# Patient Record
Sex: FEMALE | Race: BLACK OR AFRICAN AMERICAN | NOT HISPANIC OR LATINO | Employment: OTHER | URBAN - METROPOLITAN AREA
[De-identification: names, ages, dates, MRNs, and addresses within clinical notes are randomized per-mention and may not be internally consistent; named-entity substitution may affect disease eponyms.]

---

## 2017-01-13 ENCOUNTER — ANTICOAGULATION THERAPY VISIT (OUTPATIENT)
Dept: ANTICOAGULATION | Facility: CLINIC | Age: 42
End: 2017-01-13

## 2017-01-13 ENCOUNTER — ALLIED HEALTH/NURSE VISIT (OUTPATIENT)
Dept: SURGERY | Facility: CLINIC | Age: 42
End: 2017-01-13

## 2017-01-13 DIAGNOSIS — Z79.01 LONG-TERM (CURRENT) USE OF ANTICOAGULANTS: ICD-10-CM

## 2017-01-13 DIAGNOSIS — Z79.01 LONG-TERM (CURRENT) USE OF ANTICOAGULANTS: Primary | ICD-10-CM

## 2017-01-13 DIAGNOSIS — Z79.01 LONG TERM CURRENT USE OF ANTICOAGULANT THERAPY: ICD-10-CM

## 2017-01-13 DIAGNOSIS — I48.91 ATRIAL FIBRILLATION, UNSPECIFIED TYPE (H): Primary | ICD-10-CM

## 2017-01-13 LAB — INR PPP: 3.32 (ref 0.86–1.14)

## 2017-01-13 NOTE — MR AVS SNAPSHOT
MRN:[id number]                      After Visit Summary   1/13/2017    Pricilla Brown    MRN: [id number]           Visit Information        Provider Department      1/13/2017 10:30 AM Paulette Ruiz RD Surgical Weight Management        Your next 10 appointments already scheduled     Jan 13, 2017 11:15 AM   LAB with  LAB   [facility] Lab ([facility])    [address] [place] [id number]   [phone]           Patient must bring picture ID.  Patient should be prepared to give a urine specimen  Please do not eat 10-12 hours before your appointment if you are coming in fasting for labs on lipids, cholesterol, or glucose (sugar).  Pregnant women should follow their Care Team instructions. Water with medications is okay. Do not drink coffee or other fluids.   If you have concerns about taking  your medications, please ask at office or if scheduling via [employer], send a message by clicking on Secure Messaging, Message Your Care Team.            Jan 24, 2017  7:30 AM   Lab with  LAB   [facility] Lab ([facility])    [address] [place] [id number] [phone]            Jan 24, 2017  8:00 AM   (Arrive by 7:45 AM)   RETURN HEART FAILURE with Percy Alcala MD   [facility] Heart Care ([facility])    [address] [place] [id number] [phone]            Feb 07, 2017 11:30 AM   (Arrive by 11:00 AM)   RETURN DIABETES with Isela Archibald PA-C   [facility] Endocrinology ([facility])    [address] [place] [phone] [phone]            Feb 28, 2017 10:00 AM   Return Sleep Patient with YOCASTA Horan CNP   [facility], [place], Sleep Study ([facility])    [address] [place] [phone]               Care Instructions    GOALS:  Relating To Eating:  Continue to Avoid Sugar and Limit Carbs:  -Track food intake prior to eating on [email or web address] with a target of no more than 1,800 Calories for 2 lbs/week weight loss, 2400 mg sodium or less/day would be great.    - Once you are re-evaluated by medical weight management and your diabetes management team, start a modified liquid diet (900 Jagjit/day):   Follow the Modified Liquid Diet for weight loss:  Breakfast: Protein Shake  Lunch: Protein Shake  Supper: 3 oz lean protein + non-starchy vegetables  Snack: non-starchy vegetables (no calorie-containing dips/condiments)  Beverages: at least 48-64 oz water between meals daily    *Protein Shake Criteria: ~200 Calories, at least 20 grams of protein, and less than 10 grams of sugar     -Eat slowly (20-30 minutes per meal), chewing foods well (25 chews per bite/applesauce consistency)    -Increase walking as able.  Walk around the building twice weekly, increasing as able.    Follow-up with Paulette in 2 weeks for weight check and 1 month for full follow-up appointment.   See medical weight management (NANETTE Kerr) as soon as possible.        [employer] Information     [employer] gives you secure access to your electronic health record. If you see a primary care provider, you can also send messages to your care team and make appointments. If you have questions, please call your primary care clinic.  If you do not have a primary care provider, please call [phone] and they will assist you.      [employer] is an electronic gateway that provides easy, online access to your medical records. With [employer], you can request a clinic appointment, read your test results, renew a prescription or communicate with your care team.     To access your existing account, please contact your [facility] or call [phone] for assistance.        Care EveryWhere ID     This is your Care EveryWhere ID. This could  be used by other organizations to access your [facility] medical records  [id number]

## 2017-01-13 NOTE — PROGRESS NOTES
"  ANTICOAGULATION FOLLOW-UP CLINIC VISIT    Patient Name:  Pricilla Brown  Date:  1/13/2017    SUBJECTIVE:    Indication: A. Fib    Bleeding Signs/Symptoms:  \"bruising easily\" per patient.  Thromboembolic Signs/Symptoms:  None    Medication Changes:  No  Dietary Changes:  No  Activity Changes: No  Bacterial/Viral Infection:  No    Missed Coumadin Doses:  None    Other Concerns:  No    OBJECTIVE:    INR Today:  3.32  Current Dose:  20mg MWF, 15mg ROW    ASSESSMENT:    Supratherapeutic INR for goal of 2-2.5    PLAN: see below    New Dose: 1/13-15mg (one time dose), then resume 20mg MWF and 15 mg ROW      Next INR: 1 week    Spoke with patient.          "

## 2017-01-13 NOTE — PATIENT INSTRUCTIONS
GOALS:  Relating To Eating:  Continue to Avoid Sugar and Limit Carbs:  -Track food intake prior to eating on [email or web address] with a target of no more than 1,800 Calories for 2 lbs/week weight loss, 2400 mg sodium or less/day would be great.    - Once you are re-evaluated by medical weight management and your diabetes management team, start a modified liquid diet (900 Jagjit/day):   Follow the Modified Liquid Diet for weight loss:  Breakfast: Protein Shake  Lunch: Protein Shake  Supper: 3 oz lean protein + non-starchy vegetables  Snack: non-starchy vegetables (no calorie-containing dips/condiments)  Beverages: at least 48-64 oz water between meals daily    *Protein Shake Criteria: ~200 Calories, at least 20 grams of protein, and less than 10 grams of sugar     -Eat slowly (20-30 minutes per meal), chewing foods well (25 chews per bite/applesauce consistency)    -Increase walking as able.  Walk around the building twice weekly, increasing as able.    Follow-up with Paulette in 2 weeks for weight check and 1 month for full follow-up appointment.   See medical weight management (NANETTE Kerr) as soon as possible.

## 2017-01-13 NOTE — MR AVS SNAPSHOT
After Visit Summary   1/13/2017    Pricilla Brown    MRN: [id number]           Patient Information     Date Of Birth          1975        Visit Information        Provider Department      1/13/2017 10:30 AM Paulette Ruiz RD Surgical Weight Management        Care Instructions    GOALS:  Relating To Eating:  Continue to Avoid Sugar and Limit Carbs:  -Track food intake prior to eating on MyFitnessPal.com with a target of no more than 1,800 Calories for 2 lbs/week weight loss, 2400 mg sodium or less/day would be great.    - Once you are re-evaluated by medical weight management and your diabetes management team, start a modified liquid diet (900 Jagjit/day):   Follow the Modified Liquid Diet for weight loss:  Breakfast: Protein Shake  Lunch: Protein Shake  Supper: 3 oz lean protein + non-starchy vegetables  Snack: non-starchy vegetables (no calorie-containing dips/condiments)  Beverages: at least 48-64 oz water between meals daily    *Protein Shake Criteria: ~200 Calories, at least 20 grams of protein, and less than 10 grams of sugar     -Eat slowly (20-30 minutes per meal), chewing foods well (25 chews per bite/applesauce consistency)    -Increase walking as able.  Walk around the building twice weekly, increasing as able.    Follow-up with Paulette in 2 weeks for weight check and 1 month for full follow-up appointment.   See medical weight management (NANETTE Kerr) as soon as possible.         Follow-ups after your visit        Your next 10 appointments already scheduled     Jan 13, 2017 11:15 AM   LAB with  LAB    Health Lab ([facility])    [address] [place] [id number] [phone]           Patient must bring picture ID.  Patient should be prepared to give a urine specimen  Please do not eat 10-12 hours before your appointment if you are coming in fasting for labs on lipids, cholesterol, or glucose (sugar).  Pregnant women  should follow their Care Team instructions. Water with medications is okay. Do not drink coffee or other fluids.   If you have concerns about taking  your medications, please ask at office or if scheduling via [employer], send a message by clicking on Secure Messaging, Message Your Care Team.            Jan 24, 2017  7:30 AM   Lab with  LAB   [facility] ([facility])    [address] [place] [address]  [facility] [id number] [phone]            Jan 24, 2017  8:00 AM   (Arrive by 7:45 AM)   RETURN HEART FAILURE with Percy Alcala MD   [facility] Heart Care ([facility])    [address] [place] [phone]            Feb 03, 2017  7:15 AM   (Arrive by 7:00 AM)   Return Weight Management Visit with Steph Kim PA-C   Medical Weight Management ([facility])    [address] [place] [phone] [phone]            Feb 07, 2017 11:30 AM   (Arrive by 11:00 AM)   RETURN DIABETES with Isela Archibald PA-C   [facility] Endocrinology ([facility])    [address] [place] [phone] [phone]            Feb 13, 2017 11:00 AM   NUTRITION VISIT with Paulette Ruiz RD   Surgical Weight Management ([facility])    [address] [place] [phone]            Feb 27, 2017 10:30 AM   Nurse Visit with  Surgery Nurse   General Surgery ([facility])    [address] [place] [id number] [phone]            Feb 28, 2017 10:00 AM   Return Sleep Patient with YOCASTA Horan CNP   [facility], [place], Sleep Study ([facility])    [address] [place] [phone]              Who to contact     Please call your  clinic at [phone] to:    Ask questions about your health    Make or cancel appointments    Discuss your medicines    Learn about your test results    Speak to your doctor   If you have compliments or concerns about an experience at your clinic, or if you wish to file a complaint, please contact [facility] Patient Relations at [phone] or email us at [email or web address]         Additional Information About Your Visit        MyChart Information     [facility] gives you secure access to your electronic health record. If you see a primary care provider, you can also send messages to your care team and make appointments. If you have questions, please call your primary care clinic.  If you do not have a primary care provider, please call [phone] and they will assist you.      [employer] is an electronic gateway that provides easy, online access to your medical records. With [employer], you can request a clinic appointment, read your test results, renew a prescription or communicate with your care team.     To access your existing account, please contact your [facility] or call [phone] for assistance.        Care EveryWhere ID     This is your Care EveryWhere ID. This could be used by other organizations to access your [place] medical records  [id number]         Blood Pressure from Last 3 Encounters:   12/28/16 150/89   11/29/16 104/70   11/21/16 123/81    Weight from Last 3 Encounters:   12/28/16 460 lb   12/28/16 461 lb 9.6 oz   11/14/16 455 lb 11.2 oz              Today, you had the following     No orders found for display       Primary Care Provider Office Phone # Fax #    Jamilah Bernal [phone]       [facility] [address] [facility] [address]  [facility] [address]        Thank you!     Thank you for choosing SURGICAL WEIGHT MANAGEMENT  for your care. Our goal is always to provide you with excellent care. Hearing  back from our patients is one way we can continue to improve our services. Please take a few minutes to complete the written survey that you may receive in the mail after your visit with us. Thank you!             Your Updated Medication List - Protect others around you: Learn how to safely use, store and throw away your medicines at www.disposemymeds.org.          This list is accurate as of: 1/13/17 11:13 AM.  Always use your most recent med list.                   Brand Name Dispense Instructions for use    acetaminophen 325 MG tablet    TYLENOL    60 tablet    Take 2 tablets (650 mg) by mouth 3 times daily as needed for mild pain or fever (total acetaminophen dose should not exceed 3000 mg per day)       albuterol 108 (90 BASE) MCG/ACT Inhaler    PROAIR HFA/PROVENTIL HFA/VENTOLIN HFA    1 Inhaler    Inhale 2 puffs into the lungs every 6 hours as needed.       bisacodyl 10 MG Suppository    DULCOLAX    6 suppository    Place 1 suppository (10 mg) rectally daily as needed for constipation       * blood glucose monitoring lancets     1 Box    Use to test blood sugars 4 times daily or as directed.       * blood glucose monitoring lancets     4 Box    Use to test blood sugar 4 times daily or as directed.       blood glucose monitoring meter device kit     1 kit    Use to test blood sugars 2 times daily or as directed.       * blood glucose monitoring test strip    ACCU-CHEK RON    100 strip    Use to test blood sugars 2 times daily or as directed.       * blood glucose monitoring test strip    ACCU-CHEK SMARTVIEW    450 each    Use to test blood sugar 5 times daily       buPROPion 100 MG 12 hr tablet    WELLBUTRIN SR    60 tablet    Take 1 tablet (100 mg) by mouth 2 times daily       capsaicin 0.025 % Crea cream    ZOSTRIX    1 Tube    Apply 1 g topically 3 times daily as needed       ciclopirox 8 % Soln     1 Bottle    Externally apply topically daily To toenails.       diclofenac 1 % Gel topical gel    VOLTAREN  "   100 g    Place 4 g onto the skin 4 times daily       DULoxetine 20 MG EC capsule    CYMBALTA    60 capsule    Take 1 capsule (20 mg) by mouth 2 times daily       Efinaconazole 10 % Soln     8 mL    Externally apply topically daily To toenails.       gabapentin 300 MG capsule    NEURONTIN    120 capsule    Take 2 capsules (600 mg) by mouth 2 times daily       hydrALAZINE 25 MG tablet    APRESOLINE    150 tablet    Take 1 tab (25 mg) in am, 2 tabs (50 mg) midday, and 2 tabs (50 mg) in pm daily       insulin glargine U-300 300 UNIT/ML injection    TOUJEO    15 mL    Inject 150 units SQ each am.       * insulin pen needle 30G X 8 MM    NOVOFINE    100 each    Use 2X daily or as directed.       * insulin pen needle 32G X 4 MM    BD RIVER U/F    200 each    Use 6 daily or as directed.       insulin syringe-needle U-100 30G X 1/2\" 0.5 ML    BD insulin syringe ULTRAFINE    100 each    Use one syringe 3 daily or as directed.       levothyroxine 200 MCG tablet    SYNTHROID/LEVOTHROID    90 tablet    Take 1 tablet (200 mcg) by mouth daily       liraglutide 18 MG/3ML soln    VICTOZA PEN    27 mL    Inject 1.8 mg Subcutaneous daily       metoprolol 50 MG 24 hr tablet    TOPROL-XL    90 tablet    Take 0.5 tablets (25 mg) by mouth At Bedtime       morphine 15 MG 12 hr tablet    MS CONTIN     Take 15 mg by mouth daily as needed       Nebulizer Compressor Kit     1 kit    1 Device 4 times daily as needed.       nicotine 14 MG/24HR 24 hr patch    [employer] NICOTINE    30 patch    Place 1 patch onto the skin every 24 hours       NovoLOG FLEXPEN 100 UNIT/ML injection   Generic drug:  insulin aspart     60 mL    Inject 55 units with meals plus correction. Pt uses approx 180 units in 24 hrs.       nystatin cream    MYCOSTATIN    90 g    Apply topically 2 times daily To toenails       order for DME     1 each    Use your CPAP device as directed by your provider. Pressure change to min 13 max 18cwp       * order for DME     1 each    Equipment " being ordered: Challenger Wide walker if available - patient needs seat, basket and brakes.       * order for DME     1 each    Equipment being ordered: BI [id number] $92  Plantar, fasciitis, LG, night splint       * order for DME     1 Units    Left foot       oxyCODONE 10 MG IR tablet    ROXICODONE    12 tablet    Take 1 tablet (10 mg) by mouth every 6 hours as needed for moderate to severe pain       oxyCODONE-acetaminophen 5-325 MG per tablet    PERCOCET    60 tablet    Take 1 tablet by mouth every 8 hours as needed for moderate to severe pain (try to limit use, no further prescriptions until seen in pain clinic)       potassium chloride SA 20 MEQ CR tablet    potassium chloride    240 tablet    Take 2 tablets (40 mEq) by mouth 4 times daily       senna 8.6 MG tablet    SENOKOT    45 tablet    Take 1 tablet by mouth 2 times daily       spironolactone 50 MG tablet    ALDACTONE    30 tablet    Take 1 tablet (50 mg) by mouth daily       tiotropium 18 MCG capsule    SPIRIVA HANDIHALER    30 capsule    Inhale contents of one capsule daily.       topiramate 25 MG tablet    TOPAMAX    120 tablet    Take 2 tablets (50 mg) by mouth 2 times daily       torsemide 100 MG tablet    DEMADEX    180 tablet    Take 1 tablet (100 mg) by mouth 2 times daily       * warfarin 7.5 MG tablet    COUMADIN    30 tablet    Take 15 mg PO daily on 10/20, 10/21, 10/22. Check INR on 10/23. Further dosing per INR level.       * JANTOVEN 10 MG tablet   Generic drug:  warfarin      Take 10 mg by mouth daily as needed       * Notice:  This list has 11 medication(s) that are the same as other medications prescribed for you. Read the directions carefully, and ask your doctor or other care provider to review them with you.

## 2017-01-13 NOTE — PROGRESS NOTES
"  Bariatric Nutrition Consultation Note    Reason For Visit: Nutrition Reassessment    Pricilla Brown is a 41 year-old (type 2 DM on insulin sliding scale) presenting today for bariatric nutrition consult.  Pt is interested in laparoscopic sleeve gastrectomy.  This is pt's fifth of 6 RD visits required.  Pt was referred by NANETTE Kerr.    ANTHROPOMETRICS:  Ht: 67\"  Initial Wt: 462.6 lbs with BMI of 72.6  Current Wt: 462.2 lbs (+4.5 lbs over the past month; -0.4 lbs from initial weight)     Required weight loss goal pre-op: -46 lbs from initial consult weight (goal weight 416.6 lbs or less before surgery)    NUTRITION HISTORY:  Food Allergies/Intolerances: none known  Cravings: sweets, chips, pop (diet)  Triggers/cues causing extra eating: boredom (currently on disability, not working)    Progress with Previous Goals:  GOALS:  Continue to Avoid Sugar and Limit Carbs:  -Get rid of the sugary beverages (no sugar pop, no juice).- Met.  -Drink plenty of water.  You may have a little Crystal Light or [place] to help avoid juice for now.- Meeting.   -Maximum of 2 fruits daily (2 cups of watermelon + 1 apple)- Meeting.   -Rid house of sweets.  Instead, try munching on vegetables rather than chips/sweets (bell peppers, carrot sticks, celery, etc)- Meeting. She gave up candy and cake.   -*Check blood sugar, if having to treat lows often, you need to have the doctor adjust your insulin down.- Meeting; Pt stated that she has not had low BGs nor high BGs for her usual (no 300s).  She plans to see her DM management team soon to discuss taking her insulin down a notch to help her so she can decrease her carbs more so.   -Have 2 meals/day, focusing on lean protein such as skinless chicken/turkey breast, fish, eggs, pork (trim off fat), low fat beef (drain off the fat) + non-starchy vegetables.- She overeats at lunch more so than dinner.  She tries to focus on lean protein and veg at dinner, but not for lunch. Lunch is typically a " couple polish sausages, chips, sandwich, fruit, etc.   -Have 1 meal/day as a Meal Replacement Protein Shake that meets this criteria: ~200 Calories, at least 20 grams of protein, and less than 10 grams of sugar. - Met a few days, but then overate the rest of the day.   -Have no more than 2/3 cup of rice or 2 slices of bread per meal maximum.- Not met, but working on it.   -Try cauliflower rice - recipes online- Meeting.   -Avoid cheese.- She has a piece of cheese a few times/week.   -Track food intake prior to eating on MyFitnessPal.com with a target of no more than 1800 Calories for 2 lbs/week weight loss, 2400 mg sodium or less/day would be great.- Not met.   -Eat slowly (20-30 minutes per meal), chewing foods well (25 chews per bite/applesauce consistency)- Meeting.   -Increase walking as able.  Walk around the building twice weekly, increasing as able.- Improving.  She walked in her grocery store instead of using the automated riding cart yesterday, for example.     Pt is struggling with her hunger quite a bit.  She tried a protein shake for breakfast, but found herself overeating the rest of the day as a result of the restriction of calories at breakfast.  Advised Pt follow-up as soon as possible with medical weight management as she is on topiramate, but isn't feeling the appetite-suppressant effects of it any longer.     *Pt reported that Shana got the best of her.  She also has been snacking on sweets quite a bit.  She had stopped taking her appetite suppressant during that time as well.  She has picked up her refill to restart.  *Pt's boyfriend stated that Pricilla (who he calls Jia) awakes every 2 hours at night to eat as of late.  However, when on topiramate, this did not occur as much.     NUTRITION DIAGNOSIS:  Obesity r/t long history of self-monitoring deficit and excessive energy intake aeb BMI >30.- continues    INTERVENTION:  Intervention Provided/Education Provided: Discussed potential  reasoning for weight gain.  Developed strategies, reviewing previous goals.  Strongly encouraged her to track food intake as a way to become more mindful.  Once her appetite suppressant medication is adjusted to optimize her weight loss with her current needs (as she is no longer responding to topiramate per Pt report), the plan will be to start the modified liquid diet.  Gave encouragement and support.  Provided pt with list of goals.      Patient Understanding: good  Expected Compliance: good      GOALS:  Relating To Eating:  Continue to Avoid Sugar and Limit Carbs:  -Track food intake prior to eating on MyFitnessPal.com with a target of no more than 1,800 Calories for 2 lbs/week weight loss, 2400 mg sodium or less/day would be great.    - Once you are re-evaluated by medical weight management and your diabetes management team, start a modified liquid diet (900 Jagjit/day):   Follow the Modified Liquid Diet for weight loss:  Breakfast: Protein Shake  Lunch: Protein Shake  Supper: 3 oz lean protein + non-starchy vegetables  Snack: non-starchy vegetables (no calorie-containing dips/condiments)  Beverages: at least 48-64 oz water between meals daily    *Protein Shake Criteria: ~200 Calories, at least 20 grams of protein, and less than 10 grams of sugar     -Eat slowly (20-30 minutes per meal), chewing foods well (25 chews per bite/applesauce consistency)    -Increase walking as able.  Walk around the building twice weekly, increasing as able.    Follow-Up: medical weight management as soon as possible (NANETTE Kerr), weight check in 2 weeks, follow-up with RD in 1 month.     Time spent with patient: 30 minutes.  Paulette Ruiz, YARY, LD, CLT

## 2017-01-16 ENCOUNTER — TELEPHONE (OUTPATIENT)
Dept: CARDIOLOGY | Facility: CLINIC | Age: 42
End: 2017-01-16

## 2017-01-16 NOTE — TELEPHONE ENCOUNTER
Patient called in stating that since last night she has had intermittent sharp chest pain in her chest.  She states that while this happens, she can feel her heart rate race. When the pain happens, it can radiate to the top of her arm and her arm feels weak. Patient states that this has been happening with rest or with activity.  She states she has some shortness of breath with activity but denies any nausea, vomiting, abdomen or back pain.  Patient has a history of atrial fibrillation, states that she has been taking her medication and that her last INR was high.  Instructed patient to report to the ER if the pain persists throughout the night and into the morning or if she develops any shortness of breath with the pain at rest.  Will forward message to Dr Alcala's RNCC for further review.  Patient states that she understands information provided and will call back with further questions or concerns.    Luís Ambrocio RN  RN Care Coordinator  [facility]  [phone]

## 2017-01-20 ENCOUNTER — PRE VISIT (OUTPATIENT)
Dept: CARDIOLOGY | Facility: CLINIC | Age: 42
End: 2017-01-20

## 2017-01-20 DIAGNOSIS — I42.0 DILATED CARDIOMYOPATHY (H): Primary | ICD-10-CM

## 2017-01-22 NOTE — PROGRESS NOTES
Percy Alcala M.D.  Cardiovascular Medicine    I personally saw and examined this patient, discussed care with housestaff and other consultants, reviewed current laboratories and imaging studies, and conveyed impression and diagnostic/therapeutic plan to patient.    Problem List  Atrial fibrillation    SOB (shortness of breath  Chronic diastolic heart failure    Dilated cardiomyopathy    Chronic atrial fibrillation    Cellulitis  Long-term  use of anticoagulants    Plantar fasciitis  Neuropathic pain of lower extremity  Peritonsillar abscess  Asthma  Azotemia  Hypothyroidism following radioiodine therapy  JYOTI (obstructive sleep apnea) CPAP Min Pressure of 13 and Max Pressure of 18  Chronic anticoagulation for a-fib  Morbid obesity    Diabetes mellitus, type 2     History    Patient returns for follow-up with intermittent palpitations and exertional chest pain described as heaviness and pressure in chest that comes on with exertion and is relieved by rest.  It is qualitatively different than the known costochondritis.  It has awakened her from sleep at night on one occasion.  There is no pleuropericardial component.  There is no hemoptysis.  There is no pre-syncope or syncope.    REVIEW of SYMPTOMS    Constitutional: without fever, chills, night sweats.  Weight is ___  HEENT: without dry eyes, dry mouth, sinusitis, corryza, visual changes  Endocrine: without polyuria, polydypsia, polyphagia, heat or cold intolerance, changing mental status  Cardiology: without chest pain, tightness, heaviness, pressure, paroxysmal dyspnea, orthopnea, palpitation, pre-syncope or syncope or device discharge (if present)  Pulmonary: without asthma, wheezing, cough, hemoptysis  GI: without nausea, emesis, jaundice, pain, hematemesis, melena  : without frequency, urgency, hematuria, stones, pain, abnormal bleeding, frequency, urgency  Neurologic: without TIA, CVA, trauma, seizure  Dermatologic: without lesions, abrasion rash,    Orthopedic/Rheum: without significant joint pain, impairment, limb, polyserositis, ulceration, Raynauds  Heme: without mass, bruising, frequent infection, anemia  Psychiatric: without substance abuse, hallucination, medication, depression    Exercise tolerance:      Objective    Constitutional: alert, oriented, normal gait and station, normal mentation.  Oral: moist mucous membranes  Lymph: without pathologic adenopathy  Chest: basilar dullness and rales  Cor: No evidence of left or right ventricular activity.  Rhythm is regular.  S1 normal, S2 split physiologically. Murmurs are not present  Abdomen: without tenderness, rebound, guarding, masses, ascites  Extremities: Edema not present  Neuro: no focal defects, normal mentation  Skin: without open lesions  Psych: oriented, verbal, mental status in tact    Wt Readings from Last 5 Encounters:   12/28/16 208.655 kg (460 lb)   12/28/16 209.38 kg (461 lb 9.6 oz)   11/14/16 206.704 kg (455 lb 11.2 oz)   11/10/16 207.067 kg (456 lb 8 oz)   10/13/16 206.931 kg (456 lb 3.2 oz)       Meds  Current Outpatient Prescriptions   Medication     morphine (MS CONTIN) 15 MG 12 hr tablet     JANTOVEN 10 MG tablet     buPROPion (WELLBUTRIN SR) 100 MG 12 hr tablet     hydrALAZINE (APRESOLINE) 25 MG tablet     NOVOLOG FLEXPEN 100 UNIT/ML soln     insulin glargine U-300 (TOUJEO) 300 UNIT/ML injection     acetaminophen (TYLENOL) 325 MG tablet     order for DME     liraglutide (VICTOZA PEN) 18 MG/3ML soln     blood glucose monitoring (ACCU-CHEK FASTCLIX) lancets     diclofenac (VOLTAREN) 1 % GEL     oxyCODONE (ROXICODONE) 10 MG immediate release tablet     warfarin (COUMADIN) 7.5 MG tablet     metoprolol (TOPROL-XL) 50 MG 24 hr tablet     torsemide (DEMADEX) 100 MG tablet     order for DME     capsaicin (ZOSTRIX) 0.025 % CREA     order for DME     nicotine (CVS NICOTINE) 14 MG/24HR patch 2h hr     blood glucose monitoring (ACCU-CHEK SMARTVIEW) test strip     insulin pen needle (BD RIVER  "U/F) 32G X 4 MM     topiramate (TOPAMAX) 25 MG tablet     ciclopirox 8 % SOLN     nystatin (MYCOSTATIN) cream     Efinaconazole 10 % SOLN     insulin pen needle (NOVOFINE) 30G X 8 MM     gabapentin (NEURONTIN) 300 MG capsule     spironolactone (ALDACTONE) 50 MG tablet     potassium chloride SA (POTASSIUM CHLORIDE) 20 MEQ tablet     levothyroxine (SYNTHROID, LEVOTHROID) 200 MCG tablet     insulin syringe-needle U-100 (BD INSULIN SYRINGE ULTRAFINE) 30G X 1/2\" 0.5 ML     blood glucose monitoring (ONE TOUCH DELICA) lancets     oxyCODONE-acetaminophen (PERCOCET) 5-325 MG per tablet     blood glucose (ACCU-CHEK RON) test strip     DULoxetine (CYMBALTA) 20 MG capsule     senna (SENOKOT) 8.6 MG tablet     bisacodyl (DULCOLAX) 10 MG suppository     Blood Glucose Monitoring Suppl (ACCU-CHEK RON PLUS) W/DEVICE KIT     tiotropium (SPIRIVA HANDIHALER) 18 MCG inhalation capsule     Respiratory Therapy Supplies (NEBULIZER COMPRESSOR) KIT     ORDER FOR DME     albuterol (PROAIR HFA, PROVENTIL HFA, VENTOLIN HFA) 108 (90 BASE) MCG/ACT inhaler     No current facility-administered medications for this visit.         Labs  Results for BRAXTON SALMERON (MRN [id number]) as of 1/22/2017 08:40   Ref. Range 12/28/2016 14:10 1/13/2017 11:37   Sodium Latest Ref Range: 133-144 mmol/L 135    Potassium Latest Ref Range: 3.4-5.3 mmol/L 3.3 (L)    Chloride Latest Ref Range:  mmol/L 100    Carbon Dioxide Latest Ref Range: 20-32 mmol/L 28    Urea Nitrogen Latest Ref Range: 7-30 mg/dL 18    Creatinine Latest Ref Range: 0.52-1.04 mg/dL 1.01    GFR Estimate Latest Ref Range: >60 mL/min/1.7m2 60 (L)    GFR Estimate If Black Latest Ref Range: >60 mL/min/1.7m2 72    Calcium Latest Ref Range: 8.5-10.1 mg/dL 8.4 (L)    Anion Gap Latest Ref Range: 3-14 mmol/L 7    N-Terminal Pro Bnp Latest Ref Range: 0-125 pg/mL 20    Glucose Latest Ref Range: 70-99 mg/dL 204 (H)    WBC Latest Ref Range: 4.0-11.0 10e9/L 15.3 (H)    Hemoglobin Latest Ref Range: " 11.7-15.7 g/dL 12.7    Hematocrit Latest Ref Range: 35.0-47.0 % 38.1    Platelet Count Latest Ref Range: 150-450 10e9/L 323    RBC Count Latest Ref Range: 3.8-5.2 10e12/L 4.26    MCV Latest Ref Range:  fl 89    MCH Latest Ref Range: 26.5-33.0 pg 29.8    MCHC Latest Ref Range: 31.5-36.5 g/dL 33.3    RDW Latest Ref Range: 10.0-15.0 % 14.4    INR Latest Ref Range: 0.86-1.14  2.78 (H) 3.32 (H)         Assessment/Plan   1. Chest pain with feature of ASHD but known normal coronary arteries in January 2012: CT coronary angiogram    2. Known PH with known diastolic dysfunction and weight gain and dependent edema: repeat RHC in view of potential optimization for contemplated bariatric surgery

## 2017-01-24 ENCOUNTER — ANTICOAGULATION THERAPY VISIT (OUTPATIENT)
Dept: ANTICOAGULATION | Facility: CLINIC | Age: 42
End: 2017-01-24

## 2017-01-24 ENCOUNTER — OFFICE VISIT (OUTPATIENT)
Dept: CARDIOLOGY | Facility: CLINIC | Age: 42
End: 2017-01-24
Attending: INTERNAL MEDICINE
Payer: MEDICARE

## 2017-01-24 VITALS
WEIGHT: 293 LBS | BODY MASS INDEX: 45.99 KG/M2 | HEART RATE: 89 BPM | DIASTOLIC BLOOD PRESSURE: 77 MMHG | OXYGEN SATURATION: 96 % | SYSTOLIC BLOOD PRESSURE: 119 MMHG | HEIGHT: 67 IN

## 2017-01-24 DIAGNOSIS — E03.9 HYPOTHYROIDISM: Primary | ICD-10-CM

## 2017-01-24 DIAGNOSIS — Z79.01 LONG-TERM (CURRENT) USE OF ANTICOAGULANTS: ICD-10-CM

## 2017-01-24 DIAGNOSIS — I48.91 ATRIAL FIBRILLATION, UNSPECIFIED TYPE (H): Primary | ICD-10-CM

## 2017-01-24 DIAGNOSIS — I50.32 CHRONIC DIASTOLIC HEART FAILURE (H): ICD-10-CM

## 2017-01-24 DIAGNOSIS — I42.0 DILATED CARDIOMYOPATHY (H): ICD-10-CM

## 2017-01-24 LAB
ANION GAP SERPL CALCULATED.3IONS-SCNC: 6 MMOL/L (ref 3–14)
BUN SERPL-MCNC: 17 MG/DL (ref 7–30)
CALCIUM SERPL-MCNC: 8.5 MG/DL (ref 8.5–10.1)
CHLORIDE SERPL-SCNC: 101 MMOL/L (ref 94–109)
CO2 SERPL-SCNC: 31 MMOL/L (ref 20–32)
CREAT SERPL-MCNC: 0.96 MG/DL (ref 0.52–1.04)
GFR SERPL CREATININE-BSD FRML MDRD: 63 ML/MIN/1.7M2
GLUCOSE SERPL-MCNC: 212 MG/DL (ref 70–99)
INR PPP: 2.56 (ref 0.86–1.14)
POTASSIUM SERPL-SCNC: 3.4 MMOL/L (ref 3.4–5.3)
SODIUM SERPL-SCNC: 138 MMOL/L (ref 133–144)

## 2017-01-24 PROCEDURE — 36415 COLL VENOUS BLD VENIPUNCTURE: CPT | Performed by: INTERNAL MEDICINE

## 2017-01-24 PROCEDURE — 99212 OFFICE O/P EST SF 10 MIN: CPT | Mod: ZF

## 2017-01-24 PROCEDURE — 85610 PROTHROMBIN TIME: CPT | Performed by: INTERNAL MEDICINE

## 2017-01-24 PROCEDURE — 80048 BASIC METABOLIC PNL TOTAL CA: CPT | Performed by: INTERNAL MEDICINE

## 2017-01-24 PROCEDURE — 99214 OFFICE O/P EST MOD 30 MIN: CPT | Mod: ZP | Performed by: INTERNAL MEDICINE

## 2017-01-24 RX ORDER — LEVOTHYROXINE SODIUM 200 UG/1
200 TABLET ORAL DAILY
Qty: 90 TABLET | Refills: 3 | Status: SHIPPED | OUTPATIENT
Start: 2017-01-24 | End: 2018-02-07

## 2017-01-24 RX ORDER — LIDOCAINE 40 MG/G
CREAM TOPICAL
Status: CANCELLED | OUTPATIENT
Start: 2017-01-24

## 2017-01-24 ASSESSMENT — PAIN SCALES - GENERAL: PAINLEVEL: NO PAIN (0)

## 2017-01-24 NOTE — NURSING NOTE
Chief Complaint   Patient presents with     Follow Up For     HEART FAILURE RETURN- 6 MONTH   NOTE- Patient has extreme SOB while and after walking

## 2017-01-24 NOTE — MR AVS SNAPSHOT
After Visit Summary   1/24/2017    Pricilla Brown    MRN: [id number]           Patient Information     Date Of Birth          1975        Visit Information        Provider Department      1/24/2017 8:00 AM Percy Alcala MD [facility]        Today's Diagnoses     Chest pain    -  1       Care Instructions    CT angiogram    Right heart cath    Check-In  Time Check-In Location Estimated Length Procedure   Name        [facility]  waiting room 60-90 minutes Right Heart Catheterization**     Procedure Preparations & Instructions     This is an invasive procedure that DOES require preparation:    - Nothing to eat for 6 hours   - You may have clear liquids up until the time of your procedure  - A ride should be arranged for you in the instance you are unable to drive home, however you should be able to function as you normally would after the procedure     *For Patients with Diabetes: ? DO NOT take any oral diabetic medication, short-acting diabetes medications/insulin, humalog or regular insulin the morning of your test  ? Take   dose of long-acting insulin (Lantus, Levemir) the day of your test  ? Remember to  bring your glucometer and insulin with you to take after your test if needed     *For Patients on anticoagulants: ? Hold your Coumadin 2 days prior       If you have questions, please call Dr. Alcala's nurse Maite RN at [phone]  Press #1 for University and #3 to speak with a nurse        Follow-ups after your visit        Follow-up notes from your care team     Return in about 2 weeks (around 2/7/2017) for follow up after testing complete with Dr. Parra  return heart failure.      Your next 10 appointments already scheduled     Jan 26, 2017  3:00 PM   CTA ANGIOGRAM CORONARY ARTERY with [id number]   [facility], [place], [place] ([facility])    [address] [place] [id number] [phone]           Please allow two hours for  this test.  Follow the instructions below:   The day before your exam, drink extra fluids at least six 8-ounce glasses (unless your doctor wants you to restrict your fluids).   No caffeine and no smoking the day of the test.   Do not eat or drink for 3 hours before your exam. You may take your morning medicines with small sips of water.   You may have or need a blood test (creatinine test) within 30 days of your exam. Go to your clinic or Diagnostic Imaging Department for this test.   If you take Viagra, Levitra or Cialis, stop taking it for 48 hours before your test.   If you are diabetic and take oral hypoglycemics, do not take them on the day of your test. Also, wait 48 hours before re-starting metformin (Avandamet, Glucophage, Glucovance, Metaglip).   Do not take diuretics on day of the test. This includes Furosemide (Lasix), Torsemide, Bumetanide (Bumex), Metolazone (Zaroxolyn) and Hydrochlorothiazide.   Do not take NSAIDS on the day of the test. This includes ibuprofen (Advil or Motrin), Naproxen and Indomethacin.  Bring any scans or X-rays taken at other hospitals, if similar tests were done. Also bring a list of your medicines, including vitamins, minerals and over-the-counter drugs. It is safest to leave personal items at home.  Be sure to tell your doctor:   If you have any allergies, including any reaction to contrast.   If there s any chance you are pregnant.   If you are breastfeeding.   If you have any special needs.  Please wear loose clothing, such as a sweat suit or jogging clothes. Avoid snaps, zippers and other metal. We may ask you to undress and put on a hospital gown.  If you have any questions, please call the Imaging Department where you will have your exam.            Feb 02, 2017 11:00 AM   Return Visit with Norman Jean DPM   [facility]    [id number] [place] [address]   [phone]            Feb 03, 2017  7:15 AM    (Arrive by 7:00 AM)   Return Weight Management Visit with Steph Kim PA-C   [facility] Medical Weight Management ([facility])    [address] [facility] [place] [address]   [phone]            Feb 06, 2017  8:30 AM   Heart Cath Right Heart Cath with [id number]   [facility],  Heart Cath Lab ([facility])    [address] [address]   [phone]            Feb 07, 2017 11:30 AM   (Arrive by 11:00 AM)   RETURN DIABETES with Isela Archibald PA-C   [facility] Endocrinology ([facility])    [address]  [address] [facility] [place] [address] [phone]            Feb 13, 2017 11:00 AM   NUTRITION VISIT with Paulette Ruiz RD   [facility] Surgical Weight Management ([facility])    [address]  [address] [facility] [place] [address]   [phone]            Feb 22, 2017  3:30 PM   (Arrive by 3:15 PM)   RETURN HEART FAILURE with Percy Alcala MD   [facility] Heart Care ([facility])    [address]  [address] [facility] [place] [address] [phone]            Feb 27, 2017 10:30 AM   Nurse Visit with  Surgery Nurse   General Surgery ([facility])    [address] [place] [address]  [address] [facility] [address] [phone]            Feb 28, 2017 10:00 AM   Return Sleep Patient with YOCASTA Horan CNP   [facility] [place], Sleep Study ([facility])    [address] [place] [id number] [phone]              Future tests that were ordered for you today     Open Future Orders        Priority Expected Expires Ordered    Heart Cath Right heart cath Routine  1/24/2018 1/24/2017    CT Angiogram coronary artery Routine 1/25/2017 1/24/2018 1/24/2017            Who to contact     If you have questions or need  "follow up information about today's clinic visit or your schedule please contact [facility] directly at [phone].  Normal or non-critical lab and imaging results will be communicated to you by MyChart, letter or phone within 4 business days after the clinic has received the results. If you do not hear from us within 7 days, please contact the clinic through [employer] or phone. If you have a critical or abnormal lab result, we will notify you by phone as soon as possible.  Submit refill requests through [employer] or call your pharmacy and they will forward the refill request to us. Please allow 3 business days for your refill to be completed.          Additional Information About Your Visit        [employer] Information     [employer] gives you secure access to your electronic health record. If you see a primary care provider, you can also send messages to your care team and make appointments. If you have questions, please call your primary care clinic.  If you do not have a primary care provider, please call [phone] and they will assist you.        Care EveryWhere ID     This is your Care EveryWhere ID. This could be used by other organizations to access your [facility] medical records  [id number]        Your Vitals Were     Pulse Height BMI (Body Mass Index) Pulse Oximetry          89 1.702 m (5' 7\") 72.04 kg/m2 96%         Blood Pressure from Last 3 Encounters:   01/24/17 119/77   12/28/16 150/89   11/29/16 104/70    Weight from Last 3 Encounters:   01/24/17 208.7 kg (460 lb 1.6 oz)   12/28/16 208.655 kg (460 lb)   12/28/16 209.38 kg (461 lb 9.6 oz)               Primary Care Provider Office Phone # Fax #    Jamilah Bernal [phone]       [address]  [facility] [address]        Thank you!     Thank you for choosing [facility]  for your care. Our goal is always to provide you with excellent care. Hearing back from our patients is one way we can " continue to improve our services. Please take a few minutes to complete the written survey that you may receive in the mail after your visit with us. Thank you!             Your Updated Medication List - Protect others around you: Learn how to safely use, store and throw away your medicines at www.disposemymeds.org.          This list is accurate as of: 1/24/17  9:10 AM.  Always use your most recent med list.                   Brand Name Dispense Instructions for use    acetaminophen 325 MG tablet    TYLENOL    60 tablet    Take 2 tablets (650 mg) by mouth 3 times daily as needed for mild pain or fever (total acetaminophen dose should not exceed 3000 mg per day)       albuterol 108 (90 BASE) MCG/ACT Inhaler    PROAIR HFA/PROVENTIL HFA/VENTOLIN HFA    1 Inhaler    Inhale 2 puffs into the lungs every 6 hours as needed.       bisacodyl 10 MG Suppository    DULCOLAX    6 suppository    Place 1 suppository (10 mg) rectally daily as needed for constipation       * blood glucose monitoring lancets     1 Box    Use to test blood sugars 4 times daily or as directed.       * blood glucose monitoring lancets     4 Box    Use to test blood sugar 4 times daily or as directed.       blood glucose monitoring meter device kit     1 kit    Use to test blood sugars 2 times daily or as directed.       * blood glucose monitoring test strip    ACCU-CHEK RON    100 strip    Use to test blood sugars 2 times daily or as directed.       * blood glucose monitoring test strip    ACCU-CHEK SMARTVIEW    450 each    Use to test blood sugar 5 times daily       buPROPion 100 MG 12 hr tablet    WELLBUTRIN SR    60 tablet    Take 1 tablet (100 mg) by mouth 2 times daily       capsaicin 0.025 % Crea cream    ZOSTRIX    1 Tube    Apply 1 g topically 3 times daily as needed       ciclopirox 8 % Soln     1 Bottle    Externally apply topically daily To toenails.       diclofenac 1 % Gel topical gel    VOLTAREN    100 g    Place 4 g onto the skin 4  "times daily       DULoxetine 20 MG EC capsule    CYMBALTA    60 capsule    Take 1 capsule (20 mg) by mouth 2 times daily       Efinaconazole 10 % Soln     8 mL    Externally apply topically daily To toenails.       gabapentin 300 MG capsule    NEURONTIN    120 capsule    Take 2 capsules (600 mg) by mouth 2 times daily       hydrALAZINE 25 MG tablet    APRESOLINE    150 tablet    Take 1 tab (25 mg) in am, 2 tabs (50 mg) midday, and 2 tabs (50 mg) in pm daily       insulin glargine U-300 300 UNIT/ML injection    TOUJEO    15 mL    Inject 150 units SQ each am.       * insulin pen needle 30G X 8 MM    NOVOFINE    100 each    Use 2X daily or as directed.       * insulin pen needle 32G X 4 MM    BD RIVER U/F    200 each    Use 6 daily or as directed.       insulin syringe-needle U-100 30G X 1/2\" 0.5 ML    BD insulin syringe ULTRAFINE    100 each    Use one syringe 3 daily or as directed.       levothyroxine 200 MCG tablet    SYNTHROID/LEVOTHROID    90 tablet    Take 1 tablet (200 mcg) by mouth daily       liraglutide 18 MG/3ML soln    VICTOZA PEN    27 mL    Inject 1.8 mg Subcutaneous daily       metoprolol 50 MG 24 hr tablet    TOPROL-XL    90 tablet    Take 0.5 tablets (25 mg) by mouth At Bedtime       morphine 15 MG 12 hr tablet    MS CONTIN     Take 15 mg by mouth daily as needed       Nebulizer Compressor Kit     1 kit    1 Device 4 times daily as needed.       nicotine 14 MG/24HR 24 hr patch    CVS NICOTINE    30 patch    Place 1 patch onto the skin every 24 hours       NovoLOG FLEXPEN 100 UNIT/ML injection   Generic drug:  insulin aspart     60 mL    Inject 55 units with meals plus correction. Pt uses approx 180 units in 24 hrs.       nystatin cream    MYCOSTATIN    90 g    Apply topically 2 times daily To toenails       order for DME     1 each    Use your CPAP device as directed by your provider. Pressure change to min 13 max 18cwp       * order for DME     1 each    Equipment being ordered: Challenger Wide walker " if available - patient needs seat, basket and brakes.       * order for DME     1 each    Equipment being ordered:  [id number] $92  Plantar, fasciitis, LG, night splint       * order for DME     1 Units    Left foot       oxyCODONE 10 MG IR tablet    ROXICODONE    12 tablet    Take 1 tablet (10 mg) by mouth every 6 hours as needed for moderate to severe pain       oxyCODONE-acetaminophen 5-325 MG per tablet    PERCOCET    60 tablet    Take 1 tablet by mouth every 8 hours as needed for moderate to severe pain (try to limit use, no further prescriptions until seen in pain clinic)       potassium chloride SA 20 MEQ CR tablet    potassium chloride    240 tablet    Take 2 tablets (40 mEq) by mouth 4 times daily       senna 8.6 MG tablet    SENOKOT    45 tablet    Take 1 tablet by mouth 2 times daily       spironolactone 50 MG tablet    ALDACTONE    30 tablet    Take 1 tablet (50 mg) by mouth daily       tiotropium 18 MCG capsule    SPIRIVA HANDIHALER    30 capsule    Inhale contents of one capsule daily.       topiramate 25 MG tablet    TOPAMAX    120 tablet    Take 2 tablets (50 mg) by mouth 2 times daily       torsemide 100 MG tablet    DEMADEX    180 tablet    Take 1 tablet (100 mg) by mouth 2 times daily       * warfarin 7.5 MG tablet    COUMADIN    30 tablet    Take 15 mg PO daily on 10/20, 10/21, 10/22. Check INR on 10/23. Further dosing per INR level.       * JANTOVEN 10 MG tablet   Generic drug:  warfarin      Take 10 mg by mouth daily as needed       * Notice:  This list has 11 medication(s) that are the same as other medications prescribed for you. Read the directions carefully, and ask your doctor or other care provider to review them with you.

## 2017-01-24 NOTE — PROGRESS NOTES
ANTICOAGULATION FOLLOW-UP CLINIC VISIT    Patient Name:  Pricilla Brown  Date:  1/24/2017  Contact Type:  Telephone    SUBJECTIVE:     Patient Findings     Positives No Problem Findings           OBJECTIVE    INR   Date Value Ref Range Status   01/24/2017 2.56* 0.86 - 1.14 Final     CHROMOGENIC FACTOR 10   Date Value Ref Range Status   10/18/2016 20* 70 - 130 % Final     Comment:     Therapeutic Range:  A Chromogenic Factor 10 level of approximately 20-40%   inversely correlates with an INR of 2-3 for patients receiving Warfarin.   Chromogenic Factor 10 levels below 20% indicate an INR greater than 3 and   levels above 40% indicate an INR less than 2.         ASSESSMENT / PLAN  INR assessment THER    Recheck INR In: 1 WEEK    INR Location Clinic      Anticoagulation Summary as of 1/24/2017     INR goal 2.0-2.5   Selected INR 2.56! (1/24/2017)   Maintenance plan 20 mg (5 mg x 4) on Mon, Wed, Fri; 15 mg (5 mg x 3) all other days   Full instructions 2/4: Hold; 2/5: Hold; Otherwise 20 mg on Mon, Wed, Fri; 15 mg all other days   Weekly total 120 mg   Plan last modified Dorene Edge RN (1/13/2017)   Next INR check 2/2/2017   Priority INR   Target end date Indefinite    Indications   Atrial fibrillation (H) [I48.91] [I48.91]  Long-term (current) use of anticoagulants [Z79.01] [Z79.01]         Anticoagulation Episode Summary     INR check location Clinic Lab    Preferred lab     Send INR reminders to [facility]    Comments Contact Patient at [phone]      Anticoagulation Care Providers     Provider Role Specialty Phone number    Percy Alcala MD  Cardiology [phone]            See the Encounter Report to view Anticoagulation Flowsheet and Dosing Calendar (Go to Encounters tab in chart review, and find the Anticoagulation Therapy Visit)    Spoke with Pricilla.  She has a right heart cath scheduled 2/6/17 and has been instructed by cardiology to hold her warfarin x 2 days before.(see Epic note  1/24/17).  Pricilla is aware of this.  She has an appt 2/2/17 and will recheck her INR at that time.    Velvet Khan RN

## 2017-01-24 NOTE — PATIENT INSTRUCTIONS
CT angiogram    Right heart cath    Check-In  Time Check-In Location Estimated Length Procedure   Name        [facility]  waiting room 60-90 minutes Right Heart Catheterization**     Procedure Preparations & Instructions     This is an invasive procedure that DOES require preparation:    - Nothing to eat for 6 hours   - You may have clear liquids up until the time of your procedure  - A ride should be arranged for you in the instance you are unable to drive home, however you should be able to function as you normally would after the procedure     *For Patients with Diabetes: ? DO NOT take any oral diabetic medication, short-acting diabetes medications/insulin, humalog or regular insulin the morning of your test  ? Take   dose of long-acting insulin (Lantus, Levemir) the day of your test  ? Remember to  bring your glucometer and insulin with you to take after your test if needed     *For Patients on anticoagulants: ? Hold your Coumadin 2 days prior       If you have questions, please call Dr. Alcala's nurse Maite CHAPPELL at [phone]  Press #1 for University and #3 to speak with a nurse

## 2017-01-24 NOTE — Clinical Note
1/24/2017      RE: Pricilla Brown  [address] [place] [id number]       Dear Colleague,    Thank you for the opportunity to participate in the care of your patient, Pricilla Brown, at the [facility]. Please see a copy of my visit note below.    Percy Alcala M.D.  Cardiovascular Medicine    I personally saw and examined this patient, discussed care with housestaff and other consultants, reviewed current laboratories and imaging studies, and conveyed impression and diagnostic/therapeutic plan to patient.    Problem List  Atrial fibrillation    SOB (shortness of breath  Chronic diastolic heart failure    Dilated cardiomyopathy    Chronic atrial fibrillation    Cellulitis  Long-term  use of anticoagulants    Plantar fasciitis  Neuropathic pain of lower extremity  Peritonsillar abscess  Asthma  Azotemia  Hypothyroidism following radioiodine therapy  JYOTI (obstructive sleep apnea) CPAP Min Pressure of 13 and Max Pressure of 18  Chronic anticoagulation for a-fib  Morbid obesity    Diabetes mellitus, type 2     History    Patient returns for follow-up with intermittent palpitations and exertional chest pain described as heaviness and pressure in chest that comes on with exertion and is relieved by rest.  It is qualitatively different than the known costochondritis.  It has awakened her from sleep at night on one occasion.  There is no pleuropericardial component.  There is no hemoptysis.  There is no pre-syncope or syncope.    REVIEW of SYMPTOMS    Constitutional: without fever, chills, night sweats.  Weight is ___  HEENT: without dry eyes, dry mouth, sinusitis, corryza, visual changes  Endocrine: without polyuria, polydypsia, polyphagia, heat or cold intolerance, changing mental status  Cardiology: without chest pain, tightness, heaviness, pressure, paroxysmal dyspnea, orthopnea, palpitation, pre-syncope or syncope or device discharge (if  present)  Pulmonary: without asthma, wheezing, cough, hemoptysis  GI: without nausea, emesis, jaundice, pain, hematemesis, melena  : without frequency, urgency, hematuria, stones, pain, abnormal bleeding, frequency, urgency  Neurologic: without TIA, CVA, trauma, seizure  Dermatologic: without lesions, abrasion rash,   Orthopedic/Rheum: without significant joint pain, impairment, limb, polyserositis, ulceration, Raynauds  Heme: without mass, bruising, frequent infection, anemia  Psychiatric: without substance abuse, hallucination, medication, depression    Exercise tolerance:      Objective    Constitutional: alert, oriented, normal gait and station, normal mentation.  Oral: moist mucous membranes  Lymph: without pathologic adenopathy  Chest: basilar dullness and rales  Cor: No evidence of left or right ventricular activity.  Rhythm is regular.  S1 normal, S2 split physiologically. Murmurs are not present  Abdomen: without tenderness, rebound, guarding, masses, ascites  Extremities: Edema not present  Neuro: no focal defects, normal mentation  Skin: without open lesions  Psych: oriented, verbal, mental status in tact    Wt Readings from Last 5 Encounters:   12/28/16 208.655 kg (460 lb)   12/28/16 209.38 kg (461 lb 9.6 oz)   11/14/16 206.704 kg (455 lb 11.2 oz)   11/10/16 207.067 kg (456 lb 8 oz)   10/13/16 206.931 kg (456 lb 3.2 oz)       Meds  Current Outpatient Prescriptions   Medication     morphine (MS CONTIN) 15 MG 12 hr tablet     JANTOVEN 10 MG tablet     buPROPion (WELLBUTRIN SR) 100 MG 12 hr tablet     hydrALAZINE (APRESOLINE) 25 MG tablet     NOVOLOG FLEXPEN 100 UNIT/ML soln     insulin glargine U-300 (TOUJEO) 300 UNIT/ML injection     acetaminophen (TYLENOL) 325 MG tablet     order for DME     liraglutide (VICTOZA PEN) 18 MG/3ML soln     blood glucose monitoring (ACCU-CHEK FASTCLIX) lancets     diclofenac (VOLTAREN) 1 % GEL     oxyCODONE (ROXICODONE) 10 MG immediate release tablet     warfarin (COUMADIN)  "7.5 MG tablet     metoprolol (TOPROL-XL) 50 MG 24 hr tablet     torsemide (DEMADEX) 100 MG tablet     order for DME     capsaicin (ZOSTRIX) 0.025 % CREA     order for DME     nicotine ([employer] NICOTINE) 14 MG/24HR patch 2h hr     blood glucose monitoring (ACCU-CHEK SMARTVIEW) test strip     insulin pen needle (BD RIVER U/F) 32G X 4 MM     topiramate (TOPAMAX) 25 MG tablet     ciclopirox 8 % SOLN     nystatin (MYCOSTATIN) cream     Efinaconazole 10 % SOLN     insulin pen needle (NOVOFINE) 30G X 8 MM     gabapentin (NEURONTIN) 300 MG capsule     spironolactone (ALDACTONE) 50 MG tablet     potassium chloride SA (POTASSIUM CHLORIDE) 20 MEQ tablet     levothyroxine (SYNTHROID, LEVOTHROID) 200 MCG tablet     insulin syringe-needle U-100 (BD INSULIN SYRINGE ULTRAFINE) 30G X 1/2\" 0.5 ML     blood glucose monitoring (ONE TOUCH DELICA) lancets     oxyCODONE-acetaminophen (PERCOCET) 5-325 MG per tablet     blood glucose (ACCU-CHEK RON) test strip     DULoxetine (CYMBALTA) 20 MG capsule     senna (SENOKOT) 8.6 MG tablet     bisacodyl (DULCOLAX) 10 MG suppository     Blood Glucose Monitoring Suppl (ACCU-CHEK RON PLUS) W/DEVICE KIT     tiotropium (SPIRIVA HANDIHALER) 18 MCG inhalation capsule     Respiratory Therapy Supplies (NEBULIZER COMPRESSOR) KIT     ORDER FOR DME     albuterol (PROAIR HFA, PROVENTIL HFA, VENTOLIN HFA) 108 (90 BASE) MCG/ACT inhaler     No current facility-administered medications for this visit.         Labs  Results for BRAXTON SALMERON (MRN [id number]) as of 1/22/2017 08:40   Ref. Range 12/28/2016 14:10 1/13/2017 11:37   Sodium Latest Ref Range: 133-144 mmol/L 135    Potassium Latest Ref Range: 3.4-5.3 mmol/L 3.3 (L)    Chloride Latest Ref Range:  mmol/L 100    Carbon Dioxide Latest Ref Range: 20-32 mmol/L 28    Urea Nitrogen Latest Ref Range: 7-30 mg/dL 18    Creatinine Latest Ref Range: 0.52-1.04 mg/dL 1.01    GFR Estimate Latest Ref Range: >60 mL/min/1.7m2 60 (L)    GFR Estimate If Black Latest Ref " Range: >60 mL/min/1.7m2 72    Calcium Latest Ref Range: 8.5-10.1 mg/dL 8.4 (L)    Anion Gap Latest Ref Range: 3-14 mmol/L 7    N-Terminal Pro Bnp Latest Ref Range: 0-125 pg/mL 20    Glucose Latest Ref Range: 70-99 mg/dL 204 (H)    WBC Latest Ref Range: 4.0-11.0 10e9/L 15.3 (H)    Hemoglobin Latest Ref Range: 11.7-15.7 g/dL 12.7    Hematocrit Latest Ref Range: 35.0-47.0 % 38.1    Platelet Count Latest Ref Range: 150-450 10e9/L 323    RBC Count Latest Ref Range: 3.8-5.2 10e12/L 4.26    MCV Latest Ref Range:  fl 89    MCH Latest Ref Range: 26.5-33.0 pg 29.8    MCHC Latest Ref Range: 31.5-36.5 g/dL 33.3    RDW Latest Ref Range: 10.0-15.0 % 14.4    INR Latest Ref Range: 0.86-1.14  2.78 (H) 3.32 (H)         Assessment/Plan   1. Chest pain with feature of ASHD but known normal coronary arteries in January 2012: CT coronary angiogram    2. Known PH with known diastolic dysfunction and weight gain and dependent edema: repeat RHC in view of potential optimization for contemplated bariatric surgery      Please do not hesitate to contact me if you have any questions/concerns.     Sincerely,     Percy Alcala MD

## 2017-01-24 NOTE — NURSING NOTE
Cardiac Testing: Patient given instructions regarding CT angiogram. Discussed purpose, preparation, procedure and when to expect results reported back to the patient. Patient demonstrated understanding of this information and agreed to call with further questions or concerns.  Return Appointment: Patient given instructions regarding scheduling next clinic visit. Patient demonstrated understanding of this information and agreed to call with further questions or concerns.  Right Heart Catheterization: Patient was instructed regarding right heart catheterization, including discussion of the procedure, preparation, intra-procedural steps, and recovery at home. Patient demonstrated understanding of this information and agreed to call with further questions or concerns.  Medication Change: Patient was educated regarding in holding her Warfarin 2 days prior to her Right Heart cath  Patient stated she understood all health information given and agreed to call with further questions or concerns.

## 2017-01-24 NOTE — TELEPHONE ENCOUNTER
levothyroxine    Last Written Prescription Date:  12/25/15  Last Fill Quantity: 90,   # refills: 3  Last Office Visit : 11/10/16      Routing refill request to provider for review/approval because:  Last TSH 10/11/15

## 2017-01-25 DIAGNOSIS — M79.2 NEUROPATHIC PAIN OF LOWER EXTREMITY, UNSPECIFIED LATERALITY: Primary | ICD-10-CM

## 2017-01-25 NOTE — TELEPHONE ENCOUNTER
tylenol   Last Written Prescription Date:  11/15/16  Last Fill Quantity: 60,   # refills: 0  Last Office Visit : 11/14/16  Future Office visit:  no

## 2017-01-26 RX ORDER — ACETAMINOPHEN 325 MG/1
650 TABLET ORAL 3 TIMES DAILY PRN
Qty: 60 TABLET | Refills: 0 | Status: SHIPPED | OUTPATIENT
Start: 2017-01-26 | End: 2017-04-10

## 2017-02-01 ENCOUNTER — CARE COORDINATION (OUTPATIENT)
Dept: CARDIOLOGY | Facility: CLINIC | Age: 42
End: 2017-02-01

## 2017-02-01 DIAGNOSIS — I50.32 CHRONIC DIASTOLIC CONGESTIVE HEART FAILURE (H): ICD-10-CM

## 2017-02-01 DIAGNOSIS — R07.9 CHEST PAIN: Primary | ICD-10-CM

## 2017-02-01 DIAGNOSIS — I51.7 CARDIOMEGALY: ICD-10-CM

## 2017-02-02 ENCOUNTER — OFFICE VISIT (OUTPATIENT)
Dept: PODIATRY | Facility: CLINIC | Age: 42
End: 2017-02-02
Payer: MEDICARE

## 2017-02-02 VITALS — SYSTOLIC BLOOD PRESSURE: 130 MMHG | DIASTOLIC BLOOD PRESSURE: 78 MMHG

## 2017-02-02 DIAGNOSIS — B35.1 DERMATOPHYTOSIS OF NAIL: Primary | ICD-10-CM

## 2017-02-02 DIAGNOSIS — M76.72 PERONEAL TENDINITIS, LEFT: ICD-10-CM

## 2017-02-02 DIAGNOSIS — E11.49 DIABETIC NEUROPATHY WITH NEUROLOGIC COMPLICATION (H): ICD-10-CM

## 2017-02-02 DIAGNOSIS — E11.40 DIABETIC NEUROPATHY WITH NEUROLOGIC COMPLICATION (H): ICD-10-CM

## 2017-02-02 PROCEDURE — 99213 OFFICE O/P EST LOW 20 MIN: CPT | Mod: 25 | Performed by: PODIATRIST

## 2017-02-02 PROCEDURE — 11721 DEBRIDE NAIL 6 OR MORE: CPT | Performed by: PODIATRIST

## 2017-02-02 ASSESSMENT — PAIN SCALES - GENERAL: PAINLEVEL: SEVERE PAIN (6)

## 2017-02-02 NOTE — PATIENT INSTRUCTIONS
Thanks for coming today.  [facility]  [address] [place] [address]    To schedule future appointments in Ortho Clinic, you may call [phone].    To schedule ordered imaging by your provider:   Call Central Imaging [phone]    To schedule an injection ordered by your provider:  Call Central [facility] Injection scheduling line: [phone]  [email or web address] available online at:  [email or web address]    Please call if any further questions or concerns ([phone]).  Clinic hours 8 am to 5 pm.    Return to clinic (call) if symptoms worsen or fail to improve.

## 2017-02-02 NOTE — NURSING NOTE
"Pricilla Brown's goals for this visit include: Bilateral toenail trim and rechck lateral left foot swelling.  She requests these members of her care team be copied on today's visit information: yes    PCP: Jamilah Bernal    Referring Provider:  ESTABLISHED PATIENT  No address on file    Chief Complaint   Patient presents with     RECHECK     Bilateral toenail time, recheck left lateral foot swelling.       Initial /78 mmHg Estimated body mass index is 72.04 kg/(m^2) as calculated from the following:    Height as of 1/24/17: 1.702 m (5' 7\").    Weight as of 1/24/17: 208.7 kg (460 lb 1.6 oz).  BP completed using cuff size: large    "

## 2017-02-02 NOTE — PROGRESS NOTES
Past Medical History   Diagnosis Date     A-fib (H)      on coumadin since      Diabetes mellitus (H)      Hyperthyroidism      Graves, s/p I131 , now on prednisone and methimazole     Morbid obesity (H)      HTN (hypertension)      Sleep apnea      using CPAP     Asthma      as a kid     Dilated cardiomyopathy (H) 2013     Pulmonary embolism (H)      hospitalized in [place], on lovenox/coumadin for a few months but stopped, hypercoag w/u neg per pt     Diastolic heart failure 2/15/2013     Chronic anticoagulation for a-fib 2/15/2013     INR's followed by coumadin clinic at      Chest pain 2017     Patient Active Problem List   Diagnosis     Chronic atrial fibrillation (HCC)     Dilated cardiomyopathy (H)     Morbid obesity (H)     Diabetes mellitus, type 2 (H)     Chronic diastolic heart failure (H)     Chronic anticoagulation for a-fib     JYOTI (obstructive sleep apnea) CPAP Min Pressure of 13 and Max Pressure of 18     Hypothyroidism following radioiodine therapy     SOB (shortness of breath)     Azotemia     Asthma     Peritonsillar abscess     Plantar fasciitis     Neuropathic pain of lower extremity     Atrial fibrillation (H) [I48.91]     Long-term (current) use of anticoagulants [Z79.01]     Cellulitis     Chest pain     No past surgical history on file.  Social History     Social History     Marital Status: Single     Spouse Name: N/A     Number of Children: N/A     Years of Education: N/A     Occupational History     Not on file.     Social History Main Topics     Smoking status: Light Tobacco Smoker -- 0.25 packs/day for 13 years     Types: Cigarettes     Smokeless tobacco: Never Used      Comment: down to 5 cigs     Alcohol Use: No     Drug Use: No     Sexual Activity:     Partners: Male     Birth Control/ Protection: Condom     Other Topics Concern     Not on file     Social History Narrative    Single, no children        Gyn:        Last pap several years ago, no abnormal     No STIs            Patient is single.  She is no longer working.  She used to work in the area of customer service.  She is currently living with her sister in [place], [place].  She has no pets.  Patient has smoked a half pack of cigarettes a day for the past 10 plus years.  She states that she is down to 5 cigarettes a day with the aid of Wellbutrin.  She does not smoke cigars, pipes or chew tobacco.  She has 1 cup of coffee in the morning.  She does not drink alcohol.  Patient does not exercise.      Family History   Problem Relation Age of Onset     Thyroid Disease Mother      Grave's D     DIABETES Mother      HEART DISEASE Mother      CEREBROVASCULAR DISEASE Mother      dec. 54 yo     Hypertension Mother      Thyroid Disease Maternal Aunt      HEART DISEASE Sister      Heart Murmur     DIABETES Sister      CANCER No family hx of      Glaucoma No family hx of      Macular Degeneration No family hx of      Thyroid Disease Maternal Uncle      HEART DISEASE Father      dec in his 30s, [place]     Psychotic Disorder Brother      Bipolar     DIABETES Brother      Thyroid Disease Brother      Hyper Thyroid     HEART DISEASE Brother      Thyroid Disease Sister      Hyper Thyroid     Thyroid Disease Sister      Hyper Thyroid     Thyroid Disease Sister      Mental Health Problems       A1C     12.1   3/10/2016   Inr         2.56     1/24/2017  Last Basic Metabolic Panel:  NA      138   1/24/2017   POTASSIUM      3.4   1/24/2017  CHLORIDE      101   1/24/2017  JUSTIN      8.5   1/24/2017  CO2       31   1/24/2017  BUN       17   1/24/2017  CR     0.96   1/24/2017  GLC      212   1/24/2017  SUBJECTIVE FINDINGS:  A 41-year-old female returns to clinic for diabetic foot check and onychomycosis.  She relates her nails need to be cut.  She has numbness, tingling and neuropathy in her feet.  No ulcers or sores.  She has not gotten her new shoes yet, but she will do that.  She is getting pain in the lateral plantar left  foot.  She relates the Unna boot did previously help but it is starting to hurt again.  She relates no injuries, no other specific relieving or aggravating factors.        OBJECTIVE FINDINGS:  DP and PT are 2/4 bilaterally.  She has dystrophic, thickened nails with subungual debris, dystrophy, discoloration.  There is decreased discoloration, brittleness than previous.  There is no erythema, no drainage, no odor bilaterally.  She has pain on palpation of the fifth metatarsal base area on the left foot.  There is no gross tendon voids.  She does have peripheral edema bilaterally.      ASSESSMENT AND PLAN:  Peroneal tendinopathy, left foot.  She is diabetic with peripheral neuropathy.  Diagnosis and treatment options discussed with her.  She has onychomycosis.  The nails 1-5 bilaterally are debrided upon consent.  Continue the nystatin cream.  Diagnosis and treatment options discussed with her.  Ankle brace dispensed for the left and use discussed with her.  She will get her diabetic shoes.  She will return to clinic in about 3 months.

## 2017-02-03 ENCOUNTER — ANTICOAGULATION THERAPY VISIT (OUTPATIENT)
Dept: ANTICOAGULATION | Facility: CLINIC | Age: 42
End: 2017-02-03

## 2017-02-03 ENCOUNTER — OFFICE VISIT (OUTPATIENT)
Dept: ENDOCRINOLOGY | Facility: CLINIC | Age: 42
End: 2017-02-03

## 2017-02-03 VITALS
BODY MASS INDEX: 45.99 KG/M2 | HEIGHT: 67 IN | DIASTOLIC BLOOD PRESSURE: 87 MMHG | SYSTOLIC BLOOD PRESSURE: 135 MMHG | HEART RATE: 88 BPM | OXYGEN SATURATION: 98 % | WEIGHT: 293 LBS

## 2017-02-03 DIAGNOSIS — E66.01 MORBID OBESITY, UNSPECIFIED OBESITY TYPE (H): Primary | ICD-10-CM

## 2017-02-03 DIAGNOSIS — Z79.01 LONG-TERM (CURRENT) USE OF ANTICOAGULANTS: ICD-10-CM

## 2017-02-03 DIAGNOSIS — I48.91 ATRIAL FIBRILLATION, UNSPECIFIED TYPE (H): Primary | ICD-10-CM

## 2017-02-03 LAB — INR PPP: 2.58 (ref 0.86–1.14)

## 2017-02-03 RX ORDER — POLYETHYLENE GLYCOL 3350 17 G/17G
17 POWDER, FOR SOLUTION ORAL DAILY PRN
Status: ON HOLD | COMMUNITY
Start: 2016-12-12 | End: 2023-01-01

## 2017-02-03 RX ORDER — TOPIRAMATE 25 MG/1
75 TABLET, FILM COATED ORAL 2 TIMES DAILY
Qty: 180 TABLET | Refills: 3 | Status: SHIPPED | OUTPATIENT
Start: 2017-02-03 | End: 2017-07-19

## 2017-02-03 ASSESSMENT — ENCOUNTER SYMPTOMS
PANIC: 0
ABDOMINAL PAIN: 0
POSTURAL DYSPNEA: 1
DIFFICULTY URINATING: 0
WEAKNESS: 0
DECREASED LIBIDO: 0
TINGLING: 0
SYNCOPE: 0
SLEEP DISTURBANCES DUE TO BREATHING: 1
PARALYSIS: 0
WEIGHT LOSS: 0
LOSS OF CONSCIOUSNESS: 0
POOR WOUND HEALING: 0
CONSTIPATION: 0
POLYPHAGIA: 1
NERVOUS/ANXIOUS: 0
FLANK PAIN: 0
HEADACHES: 0
SPUTUM PRODUCTION: 0
EYE REDNESS: 1
HYPERTENSION: 1
NAUSEA: 0
DIZZINESS: 0
EXTREMITY NUMBNESS: 0
COUGH DISTURBING SLEEP: 0
DOUBLE VISION: 0
BREAST MASS: 0
COUGH: 0
MUSCLE WEAKNESS: 1
NUMBNESS: 0
WEIGHT GAIN: 0
WHEEZING: 0
HOARSE VOICE: 0
CHILLS: 0
EYE PAIN: 0
CLAUDICATION: 0
MYALGIAS: 0
DIARRHEA: 0
DECREASED CONCENTRATION: 0
VOMITING: 0
BRUISES/BLEEDS EASILY: 0
HALLUCINATIONS: 0
MEMORY LOSS: 0
TROUBLE SWALLOWING: 0
LEG PAIN: 1
HEMATURIA: 0
BLOATING: 0
RECTAL PAIN: 0
HEARTBURN: 0
HOT FLASHES: 0
DECREASED APPETITE: 0
NIGHT SWEATS: 0
ARTHRALGIAS: 0
JAUNDICE: 0
DYSPNEA ON EXERTION: 1
PALPITATIONS: 1
DISTURBANCES IN COORDINATION: 0
EXERCISE INTOLERANCE: 0
SKIN CHANGES: 0
SINUS PAIN: 0
TASTE DISTURBANCE: 0
SMELL DISTURBANCE: 0
NECK PAIN: 0
STIFFNESS: 0
NECK MASS: 0
MUSCLE CRAMPS: 0
DYSURIA: 0
TACHYCARDIA: 1
JOINT SWELLING: 0
SHORTNESS OF BREATH: 1
LEG SWELLING: 1
BLOOD IN STOOL: 0
FATIGUE: 1
FEVER: 0
BOWEL INCONTINENCE: 0
INSOMNIA: 0
SINUS CONGESTION: 0
SPEECH CHANGE: 0
ORTHOPNEA: 1
SNORES LOUDLY: 0
EYE WATERING: 1
SEIZURES: 0
ALTERED TEMPERATURE REGULATION: 0
SORE THROAT: 0
LIGHT-HEADEDNESS: 0
DEPRESSION: 0
BREAST PAIN: 0
INCREASED ENERGY: 1
SWOLLEN GLANDS: 0
NAIL CHANGES: 0
RESPIRATORY PAIN: 0
RECTAL BLEEDING: 0
BACK PAIN: 1
TREMORS: 0
HEMOPTYSIS: 0
HYPOTENSION: 0
EYE IRRITATION: 1
POLYDIPSIA: 1

## 2017-02-03 NOTE — Clinical Note
"2/3/2017       RE: Pricilla Brown  [address]   [facility] [address]     Dear Colleague,    Thank you for referring your patient, Pricilla Brown, to the [facility] MEDICAL WEIGHT MANAGEMENT at [facility]. Please see a copy of my visit note below.    Return Medical Weight Management Note     Pricilla Brown  MRN:  [id number]  :  1975  JUANITO:  2/3/2017    Dear Dolores, Jamilah Ramirez,    I had the pleasure of seeing your patient Pricilla Brown.  She is a 41 year old female who I am continuing to see for treatment of obesity related to:    I Have Reviewed The Following Co-Morbidities With The Patient 2016   I have the following co-morbidities associated with obesity: Type II Diabetes, Sleep Apnea, Back Pain   I have the following co-morbidities associated with obesity: Hypothyroidism     Note from Dr Ascencio last note:    1) Type 2 DM, uncontrolled A1C 13. Her barrier is likely from high carb diet, obesity and lack of exercise. Discussed goal of diabetes and weight loss  - given the use of high dose insulin and morbid obesity, I will switch insulin to U-500 insulin starting at 15 units tid  - restart metformin XR 500mg daily    - restart Victoza 1.8mg daily.  - continue to check BG.  - referral to dietician/CDE.  - regular exercise.    - uptodate with eye exams. Last exam in 10/2014. No retinopathy.  - defer urine microalbumins for next visit.    2) Hypothyroidism: secondary to ROGER for Graves'disease.    - check TSH today.  - continue levothyroxine 200 mcg daily.     3) Obesity: morbid obesity with BMI  - restart topiramate 75 mg daily  - refer dietician     Saw 11/15/16 Isela Archibald  \"1.  TYPE 2 DIABETES MELLITUS: Uncontrolled type 2 diabetes mellitus.  Her A1C has improved.  Increase Toujeo 145 units SQ each am and increase Novolog 50 units with meals plus correction.  Continue Victoza 1.8 mg SQ daily.  She does not tolerate Metformin due to GI distress.  Her creat " "is 0.98  with GFR 75 mL/min today.  She is to check her blood sugar premeals daily and I asked her to send me her blood sugar data weekly via [employer].    She was seen by Oph in May 2016 without retinopathy.\"    INTERVAL HISTORY:  Here for [facility] follow up.  She is interested in sleeve gastrectomy.  She has one RD visit left in February. She is working on losing 46 lbs from starting weight of 462.  She is up 4 lbs at 466 lbs today.  Taking Tuojeo  150 units in the morning.  Novolog 55 units plus correction.  Victoza 1.8.  Not taking metformin, couldn't tolerate. She is taking topiramate 50mg BID.  She felt it worked in the past and then she stopped taking it for a while.  Recently restarted topiramate and doesn't feel that it is working now.  She is tolerating topiramate without side effect.s    She is rarely going >200 for Blood sugars.  A1C 10.7 in June 2016.  Recheck next Tuesday.  Fast blood sugar varies but usually 170-190.    No low blood sugars.      CURRENT WEIGHT:   466 lbs 0 oz    Wt Readings from Last 4 Encounters:   02/03/17 466 lb   01/24/17 460 lb 1.6 oz   12/28/16 460 lb   12/28/16 461 lb 9.6 oz       Height:  5' 7.008\"  Body Mass Index:  Body mass index is 72.97 kg/(m^2).  Vitals:  /87 mmHg  Pulse 88  Ht 5' 7.01\"  Wt 466 lb  BMI 72.97 kg/m2  SpO2 98%      Initial consult weight was 466 on bariatric consult.  Total gain is 4 pounds.    No flowsheet data found.    Review of Systems     Constitutional:  Positive for fatigue and increased energy. Negative for fever, chills, weight loss, weight gain, decreased appetite, night sweats, recent stressors, height loss, post-operative complications, incisional pain, hallucinations, hyperactivity and confused.   HENT:  Negative for ear pain, hearing loss, tinnitus, nosebleeds, trouble swallowing, hoarse voice, mouth sores, sore throat, ear discharge, tooth pain, gum tenderness, taste disturbance, smell disturbance, hearing aid, bleeding gums, dry mouth, " sinus pain, sinus congestion and neck mass.    Eyes:  Positive for redness, eye watering and eye irritation. Negative for double vision, pain, eye pain, decreased vision, eye bulging, eye dryness, flashing lights, spots, floaters, strabismus, tunnel vision and jaundice.   Respiratory:   Positive for shortness of breath, sleep disturbances due to breathing, dyspnea on exertion and postural dyspnea. Negative for cough, hemoptysis, sputum production, wheezing, snores loudly, respiratory pain and cough disturbing sleep.    Cardiovascular:  Positive for chest pain, dyspnea on exertion, palpitations, orthopnea, leg swelling, hypertension, chest pain on exertion, leg pain, sleep disturbances due to breathing, tachycardia and edema. Negative for claudication, fingers/toes turn blue, hypotension, syncope, history of heart murmur, chest pain at rest, pacemaker, few scattered varicosities, light-headedness and exercise intolerance.   Gastrointestinal:  Negative for heartburn, nausea, vomiting, abdominal pain, diarrhea, constipation, blood in stool, melena, rectal pain, bloating, hemorrhoids, bowel incontinence, jaundice, rectal bleeding, coffee ground emesis and change in stool.   Genitourinary:  Negative for bladder incontinence, dysuria, urgency, hematuria, flank pain, vaginal discharge, difficulty urinating, genital sores, dyspareunia, decreased libido, nocturia, voiding less frequently, arousal difficulty, abnormal vaginal bleeding, excessive menstruation, menstrual changes, hot flashes, vaginal dryness and postmenopausal bleeding.   Musculoskeletal:  Positive for back pain and muscle weakness. Negative for myalgias, joint swelling, arthralgias, stiffness, muscle cramps, neck pain, bone pain and fracture.   Skin:  Negative for nail changes, itching, poor wound healing, rash, hair changes, skin changes, acne, warts, poor wound healing, scarring, flaky skin, Raynaud's phenomenon, sensitivity to sunlight and skin thickening.    Neurological:  Negative for dizziness, tingling, tremors, speech change, seizures, loss of consciousness, weakness, light-headedness, numbness, headaches, disturbances in coordination, extremity numbness, memory loss, difficulty walking and paralysis.   Endo/Heme:  Negative for anemia, swollen glands and bruises/bleeds easily.   Psychiatric/Behavioral:  Negative for depression, hallucinations, memory loss, decreased concentration, mood swings and panic attacks.    Breast:  Negative for breast discharge, breast mass, breast pain and nipple retraction.   Endocrine:  Positive for polyphagia and polydipsia.Negative for altered temperature regulation, unwanted hair growth and change in facial hair.      MEDICATIONS:   Current Outpatient Prescriptions   Medication     polyethylene glycol (MIRALAX/GLYCOLAX) powder     acetaminophen (TYLENOL) 325 MG tablet     levothyroxine (SYNTHROID/LEVOTHROID) 200 MCG tablet     morphine (MS CONTIN) 15 MG 12 hr tablet     JANTOVEN 10 MG tablet     buPROPion (WELLBUTRIN SR) 100 MG 12 hr tablet     hydrALAZINE (APRESOLINE) 25 MG tablet     NOVOLOG FLEXPEN 100 UNIT/ML soln     insulin glargine U-300 (TOUJEO) 300 UNIT/ML injection     order for DME     liraglutide (VICTOZA PEN) 18 MG/3ML soln     blood glucose monitoring (ACCU-CHEK FASTCLIX) lancets     diclofenac (VOLTAREN) 1 % GEL     oxyCODONE (ROXICODONE) 10 MG immediate release tablet     warfarin (COUMADIN) 7.5 MG tablet     metoprolol (TOPROL-XL) 50 MG 24 hr tablet     torsemide (DEMADEX) 100 MG tablet     order for DME     capsaicin (ZOSTRIX) 0.025 % CREA     order for DME     nicotine ([employer] NICOTINE) 14 MG/24HR patch 2h hr     blood glucose monitoring (ACCU-CHEK SMARTVIEW) test strip     insulin pen needle (BD RIVER U/F) 32G X 4 MM     topiramate (TOPAMAX) 25 MG tablet     ciclopirox 8 % SOLN     nystatin (MYCOSTATIN) cream     Efinaconazole 10 % SOLN     insulin pen needle (NOVOFINE) 30G X 8 MM     gabapentin (NEURONTIN) 300 MG  "capsule     spironolactone (ALDACTONE) 50 MG tablet     potassium chloride SA (POTASSIUM CHLORIDE) 20 MEQ tablet     insulin syringe-needle U-100 (BD INSULIN SYRINGE ULTRAFINE) 30G X 1/2\" 0.5 ML     blood glucose monitoring (ONE TOUCH DELICA) lancets     oxyCODONE-acetaminophen (PERCOCET) 5-325 MG per tablet     blood glucose (ACCU-CHEK RON) test strip     DULoxetine (CYMBALTA) 20 MG capsule     senna (SENOKOT) 8.6 MG tablet     bisacodyl (DULCOLAX) 10 MG suppository     Blood Glucose Monitoring Suppl (ACCU-CHEK RON PLUS) W/DEVICE KIT     tiotropium (SPIRIVA HANDIHALER) 18 MCG inhalation capsule     Respiratory Therapy Supplies (NEBULIZER COMPRESSOR) KIT     ORDER FOR DME     albuterol (PROAIR HFA, PROVENTIL HFA, VENTOLIN HFA) 108 (90 BASE) MCG/ACT inhaler     No current facility-administered medications for this visit.       No flowsheet data found.    ASSESSMENT:   41 y.o. Female here for [facility] follow up.  She is interested in sleeve gastrectomy and struggling to lose 46 lbs before surgery.  She recently restarted topiramate and doesn't feel it is working like it originally did.  She would like to start partial liquid diet.      PLAN:   Increase topiramate to 75mg BID.      Check blood sugar 3-4 times day.    See Isela Archibald Tuesday as planned and possibly adjust insulin due to decrease in carbs with partial liquid diet.      Goals from RD visit:   Follow the Modified Liquid Diet for weight loss:  Breakfast: Protein Shake  Lunch: Protein Shake  Supper: 3 oz lean protein + non-starchy vegetables  Snack: non-starchy vegetables (no calorie-containing dips/condiments)  Beverages: at least 48-64 oz water between meals daily    *Protein Shake Criteria: ~200 Calories, at least 20 grams of protein, and less than 10 grams of sugar     Schedule psych eval soon.    FOLLOW-UP:    4 weeks Steph Kim  Make appt with Dr Ascencio in 3-4 months        Time: 15 min spent on evaluation, management, counseling, education, & " motivational interviewing with greater than 50 % of the total time was spent on counseling and coordinating care    Sincerely,    Steph Kim PA-C

## 2017-02-03 NOTE — PROGRESS NOTES
ANTICOAGULATION FOLLOW-UP CLINIC VISIT    Patient Name:  Pricilla Brown  Date:  2/3/2017  Contact Type:  Telephone    SUBJECTIVE:        OBJECTIVE    INR   Date Value Ref Range Status   02/03/2017 2.58* 0.86 - 1.14 Final     CHROMOGENIC FACTOR 10   Date Value Ref Range Status   10/18/2016 20* 70 - 130 % Final     Comment:     Therapeutic Range:  A Chromogenic Factor 10 level of approximately 20-40%   inversely correlates with an INR of 2-3 for patients receiving Warfarin.   Chromogenic Factor 10 levels below 20% indicate an INR greater than 3 and   levels above 40% indicate an INR less than 2.         ASSESSMENT / PLAN  INR assessment THER    Recheck INR In: 3 DAYS    INR Location Clinic      Anticoagulation Summary as of 2/3/2017     INR goal 2.0-2.5   Selected INR 2.58! (2/3/2017)   Maintenance plan 20 mg (5 mg x 4) on Mon, Wed, Fri; 15 mg (5 mg x 3) all other days   Full instructions 2/4: Hold; 2/5: Hold; Otherwise 20 mg on Mon, Wed, Fri; 15 mg all other days   Weekly total 120 mg   No change documented Velvet Asencio, TA   Plan last modified Dorene Edge RN (1/13/2017)   Next INR check 2/6/2017   Priority INR   Target end date Indefinite    Indications   Atrial fibrillation (H) [I48.91] [I48.91]  Long-term (current) use of anticoagulants [Z79.01] [Z79.01]         Anticoagulation Episode Summary     INR check location Clinic Lab    Preferred lab     Send INR reminders to [facility]    Comments Contact Patient at [phone]      Anticoagulation Care Providers     Provider Role Specialty Phone number    Percy Alcala MD  Cardiology [phone]            See the Encounter Report to view Anticoagulation Flowsheet and Dosing Calendar (Go to Encounters tab in chart review, and find the Anticoagulation Therapy Visit)    Left message with results and dosing recommendations. Asked patient to call back to report any missed doses, falls, signs and symptoms of bleeding or clotting, or any  changes to health or diet.     Holding doses 2/4 and 2/5 for heart cath on 2/6. Will call 2/6 to follow up.    Velvet Asencio RN

## 2017-02-03 NOTE — PATIENT INSTRUCTIONS
*Protein Shake Criteria: ~200 Calories, at least 20 grams of protein, and less than 10 grams of sugar     Goals from RD visit:   Follow the Modified Liquid Diet for weight loss:  Breakfast: Protein Shake  Lunch: Protein Shake  Supper: 3 oz lean protein + non-starchy vegetables  Snack: non-starchy vegetables (no calorie-containing dips/condiments)  Beverages: at least 48-64 oz water between meals daily      Increase topiramate to 75mg twice daily. Hopefully this will help with evening and nighttime eating.     Make appointment to see Dr Ascencio in 3-4 months    Make appointment to see Steph Kim in 1 month    See Isela Pirere and discuss insulin adjustment if needed with partial liquid diet.

## 2017-02-03 NOTE — NURSING NOTE
"(   Chief Complaint   Patient presents with     RECHECK     Weight Management    )    ( Weight: (!) 211.376 kg (466 lb) )  ( Height: 170.2 cm (5' 7.01\") )  ( BMI (Calculated): 73.12 )  ( Seminar Weight: 208.655 kg (460 lb) )  ( Seminar Wt Minus Current Wt (lbs): -6 )  (   )  (   )  (   )  (   )    ( BP: 135/87 mmHg )  (   )  (   )  (   )  ( Pulse: 88 )  (   )  ( SpO2: 98 % )    (   Patient Active Problem List   Diagnosis     Chronic atrial fibrillation (HCC)     Dilated cardiomyopathy (H)     Morbid obesity (H)     Diabetes mellitus, type 2 (H)     Chronic diastolic heart failure (H)     Chronic anticoagulation for a-fib     JYOTI (obstructive sleep apnea) CPAP Min Pressure of 13 and Max Pressure of 18     Hypothyroidism following radioiodine therapy     SOB (shortness of breath)     Azotemia     Asthma     Peritonsillar abscess     Plantar fasciitis     Neuropathic pain of lower extremity     Atrial fibrillation (H) [I48.91]     Long-term (current) use of anticoagulants [Z79.01]     Cellulitis     Chest pain    )  (   Current Outpatient Prescriptions   Medication Sig Dispense Refill     polyethylene glycol (MIRALAX/GLYCOLAX) powder        acetaminophen (TYLENOL) 325 MG tablet Take 2 tablets (650 mg) by mouth 3 times daily as needed for mild pain or fever (total acetaminophen dose should not exceed 3000 mg per day) 60 tablet 0     levothyroxine (SYNTHROID/LEVOTHROID) 200 MCG tablet Take 1 tablet (200 mcg) by mouth daily 90 tablet 3     morphine (MS CONTIN) 15 MG 12 hr tablet Take 15 mg by mouth daily as needed       JANTOVEN 10 MG tablet Take 10 mg by mouth daily as needed       buPROPion (WELLBUTRIN SR) 100 MG 12 hr tablet Take 1 tablet (100 mg) by mouth 2 times daily 60 tablet 3     hydrALAZINE (APRESOLINE) 25 MG tablet Take 1 tab (25 mg) in am, 2 tabs (50 mg) midday, and 2 tabs (50 mg) in pm daily 150 tablet 3     NOVOLOG FLEXPEN 100 UNIT/ML soln Inject 55 units with meals plus correction. Pt uses approx 180 units " in 24 hrs. 60 mL 11     insulin glargine U-300 (TOUJEO) 300 UNIT/ML injection Inject 150 units SQ each am. 15 mL 3     order for DME Left foot 1 Units 0     liraglutide (VICTOZA PEN) 18 MG/3ML soln Inject 1.8 mg Subcutaneous daily 27 mL 3     blood glucose monitoring (ACCU-CHEK FASTCLIX) lancets Use to test blood sugar 4 times daily or as directed. 4 Box 2     diclofenac (VOLTAREN) 1 % GEL Place 4 g onto the skin 4 times daily 100 g 0     oxyCODONE (ROXICODONE) 10 MG immediate release tablet Take 1 tablet (10 mg) by mouth every 6 hours as needed for moderate to severe pain 12 tablet 0     warfarin (COUMADIN) 7.5 MG tablet Take 15 mg PO daily on 10/20, 10/21, 10/22. Check INR on 10/23. Further dosing per INR level. 30 tablet 0     metoprolol (TOPROL-XL) 50 MG 24 hr tablet Take 0.5 tablets (25 mg) by mouth At Bedtime 90 tablet 3     torsemide (DEMADEX) 100 MG tablet Take 1 tablet (100 mg) by mouth 2 times daily 180 tablet 1     order for DME Equipment being ordered:  8439-2838 $92   Plantar, fasciitis, LG, night splint 1 each 0     capsaicin (ZOSTRIX) 0.025 % CREA Apply 1 g topically 3 times daily as needed 1 Tube 0     order for DME Equipment being ordered: Challenger Wide walker if available - patient needs seat, basket and brakes. 1 each 0     nicotine ([employer] NICOTINE) 14 MG/24HR patch 2h hr Place 1 patch onto the skin every 24 hours 30 patch 1     blood glucose monitoring (ACCU-CHEK SMARTVIEW) test strip Use to test blood sugar 5 times daily 450 each 3     insulin pen needle (BD RIVER U/F) 32G X 4 MM Use 6 daily or as directed. 200 each 11     topiramate (TOPAMAX) 25 MG tablet Take 2 tablets (50 mg) by mouth 2 times daily 120 tablet 3     ciclopirox 8 % SOLN Externally apply topically daily To toenails. 1 Bottle 11     nystatin (MYCOSTATIN) cream Apply topically 2 times daily To toenails 90 g 6     Efinaconazole 10 % SOLN Externally apply topically daily To toenails. 8 mL 11     insulin pen needle (NOVOFINE)  "30G X 8 MM Use 2X daily or as directed. 100 each 3     gabapentin (NEURONTIN) 300 MG capsule Take 2 capsules (600 mg) by mouth 2 times daily 120 capsule 5     spironolactone (ALDACTONE) 50 MG tablet Take 1 tablet (50 mg) by mouth daily 30 tablet 11     potassium chloride SA (POTASSIUM CHLORIDE) 20 MEQ tablet Take 2 tablets (40 mEq) by mouth 4 times daily 240 tablet 11     insulin syringe-needle U-100 (BD INSULIN SYRINGE ULTRAFINE) 30G X 1/2\" 0.5 ML Use one syringe 3 daily or as directed. 100 each prn     blood glucose monitoring (ONE TOUCH DELICA) lancets Use to test blood sugars 4 times daily or as directed. 1 Box prn     oxyCODONE-acetaminophen (PERCOCET) 5-325 MG per tablet Take 1 tablet by mouth every 8 hours as needed for moderate to severe pain (try to limit use, no further prescriptions until seen in pain clinic) 60 tablet 0     blood glucose (ACCU-CHEK RON) test strip Use to test blood sugars 2 times daily or as directed. 100 strip 11     DULoxetine (CYMBALTA) 20 MG capsule Take 1 capsule (20 mg) by mouth 2 times daily 60 capsule 0     senna (SENOKOT) 8.6 MG tablet Take 1 tablet by mouth 2 times daily 45 tablet 0     bisacodyl (DULCOLAX) 10 MG suppository Place 1 suppository (10 mg) rectally daily as needed for constipation 6 suppository 0     Blood Glucose Monitoring Suppl (ACCU-CHEK RON PLUS) W/DEVICE KIT Use to test blood sugars 2 times daily or as directed. 1 kit 0     tiotropium (SPIRIVA HANDIHALER) 18 MCG inhalation capsule Inhale contents of one capsule daily. 30 capsule 1     Respiratory Therapy Supplies (NEBULIZER COMPRESSOR) KIT 1 Device 4 times daily as needed. 1 kit 3     ORDER FOR DME Use your CPAP device as directed by your provider. Pressure change to min 13 max 18cwp 1 each 99     albuterol (PROAIR HFA, PROVENTIL HFA, VENTOLIN HFA) 108 (90 BASE) MCG/ACT inhaler Inhale 2 puffs into the lungs every 6 hours as needed. 1 Inhaler 11    )  ( Diabetes Eval:    )    ( Pain Eval:  No Pain " (0) )    ( Wound Eval:       )    (   History   Smoking status     Light Tobacco Smoker -- 0.25 packs/day for 13 years     Types: Cigarettes   Smokeless tobacco     Never Used     Comment: down to 5 cigs    )    ( Signed By:  Maryana Kimball; February 3, 2017; 7:21 AM )

## 2017-02-03 NOTE — PROGRESS NOTES
"Return Medical Weight Management Note     Pricilla Brown  MRN:  [id number]  :  1975  JUANITO:  2/3/2017    Dear Dolores, Jamilah Ramirez,    I had the pleasure of seeing your patient Pricilla Brown.  She is a 41 year old female who I am continuing to see for treatment of obesity related to:    I Have Reviewed The Following Co-Morbidities With The Patient 2016   I have the following co-morbidities associated with obesity: Type II Diabetes, Sleep Apnea, Back Pain   I have the following co-morbidities associated with obesity: Hypothyroidism     Note from Dr Ascencio last note:    1) Type 2 DM, uncontrolled A1C 13. Her barrier is likely from high carb diet, obesity and lack of exercise. Discussed goal of diabetes and weight loss  - given the use of high dose insulin and morbid obesity, I will switch insulin to U-500 insulin starting at 15 units tid  - restart metformin XR 500mg daily    - restart Victoza 1.8mg daily.  - continue to check BG.  - referral to dietician/CDE.  - regular exercise.    - uptodate with eye exams. Last exam in 10/2014. No retinopathy.  - defer urine microalbumins for next visit.    2) Hypothyroidism: secondary to ROGER for Graves'disease.    - check TSH today.  - continue levothyroxine 200 mcg daily.     3) Obesity: morbid obesity with BMI  - restart topiramate 75 mg daily  - refer dietician     Saw 11/15/16 Isela Archibald  \"1.  TYPE 2 DIABETES MELLITUS: Uncontrolled type 2 diabetes mellitus.  Her A1C has improved.  Increase Toujeo 145 units SQ each am and increase Novolog 50 units with meals plus correction.  Continue Victoza 1.8 mg SQ daily.  She does not tolerate Metformin due to GI distress.  Her creat is 0.98  with GFR 75 mL/min today.  She is to check her blood sugar premeals daily and I asked her to send me her blood sugar data weekly via [employer].    She was seen by Oph in May 2016 without retinopathy.\"    INTERVAL HISTORY:  Here for [facility] follow up.  She is interested in sleeve gastrectomy.  " "She has one RD visit left in February. She is working on losing 46 lbs from starting weight of 462.  She is up 4 lbs at 466 lbs today.  Taking Tuojeo  150 units in the morning.  Novolog 55 units plus correction.  Victoza 1.8.  Not taking metformin, couldn't tolerate. She is taking topiramate 50mg BID.  She felt it worked in the past and then she stopped taking it for a while.  Recently restarted topiramate and doesn't feel that it is working now.  She is tolerating topiramate without side effect.s    She is rarely going >200 for Blood sugars.  A1C 10.7 in June 2016.  Recheck next Tuesday.  Fast blood sugar varies but usually 170-190.    No low blood sugars.      CURRENT WEIGHT:   466 lbs 0 oz    Wt Readings from Last 4 Encounters:   02/03/17 466 lb   01/24/17 460 lb 1.6 oz   12/28/16 460 lb   12/28/16 461 lb 9.6 oz       Height:  5' 7.008\"  Body Mass Index:  Body mass index is 72.97 kg/(m^2).  Vitals:  /87 mmHg  Pulse 88  Ht 5' 7.01\"  Wt 466 lb  BMI 72.97 kg/m2  SpO2 98%      Initial consult weight was 466 on bariatric consult.  Total gain is 4 pounds.    No flowsheet data found.    Review of Systems     Constitutional:  Positive for fatigue and increased energy. Negative for fever, chills, weight loss, weight gain, decreased appetite, night sweats, recent stressors, height loss, post-operative complications, incisional pain, hallucinations, hyperactivity and confused.   HENT:  Negative for ear pain, hearing loss, tinnitus, nosebleeds, trouble swallowing, hoarse voice, mouth sores, sore throat, ear discharge, tooth pain, gum tenderness, taste disturbance, smell disturbance, hearing aid, bleeding gums, dry mouth, sinus pain, sinus congestion and neck mass.    Eyes:  Positive for redness, eye watering and eye irritation. Negative for double vision, pain, eye pain, decreased vision, eye bulging, eye dryness, flashing lights, spots, floaters, strabismus, tunnel vision and jaundice.   Respiratory:   Positive " for shortness of breath, sleep disturbances due to breathing, dyspnea on exertion and postural dyspnea. Negative for cough, hemoptysis, sputum production, wheezing, snores loudly, respiratory pain and cough disturbing sleep.    Cardiovascular:  Positive for chest pain, dyspnea on exertion, palpitations, orthopnea, leg swelling, hypertension, chest pain on exertion, leg pain, sleep disturbances due to breathing, tachycardia and edema. Negative for claudication, fingers/toes turn blue, hypotension, syncope, history of heart murmur, chest pain at rest, pacemaker, few scattered varicosities, light-headedness and exercise intolerance.   Gastrointestinal:  Negative for heartburn, nausea, vomiting, abdominal pain, diarrhea, constipation, blood in stool, melena, rectal pain, bloating, hemorrhoids, bowel incontinence, jaundice, rectal bleeding, coffee ground emesis and change in stool.   Genitourinary:  Negative for bladder incontinence, dysuria, urgency, hematuria, flank pain, vaginal discharge, difficulty urinating, genital sores, dyspareunia, decreased libido, nocturia, voiding less frequently, arousal difficulty, abnormal vaginal bleeding, excessive menstruation, menstrual changes, hot flashes, vaginal dryness and postmenopausal bleeding.   Musculoskeletal:  Positive for back pain and muscle weakness. Negative for myalgias, joint swelling, arthralgias, stiffness, muscle cramps, neck pain, bone pain and fracture.   Skin:  Negative for nail changes, itching, poor wound healing, rash, hair changes, skin changes, acne, warts, poor wound healing, scarring, flaky skin, Raynaud's phenomenon, sensitivity to sunlight and skin thickening.   Neurological:  Negative for dizziness, tingling, tremors, speech change, seizures, loss of consciousness, weakness, light-headedness, numbness, headaches, disturbances in coordination, extremity numbness, memory loss, difficulty walking and paralysis.   Endo/Heme:  Negative for anemia, swollen  "glands and bruises/bleeds easily.   Psychiatric/Behavioral:  Negative for depression, hallucinations, memory loss, decreased concentration, mood swings and panic attacks.    Breast:  Negative for breast discharge, breast mass, breast pain and nipple retraction.   Endocrine:  Positive for polyphagia and polydipsia.Negative for altered temperature regulation, unwanted hair growth and change in facial hair.      MEDICATIONS:   Current Outpatient Prescriptions   Medication     polyethylene glycol (MIRALAX/GLYCOLAX) powder     acetaminophen (TYLENOL) 325 MG tablet     levothyroxine (SYNTHROID/LEVOTHROID) 200 MCG tablet     morphine (MS CONTIN) 15 MG 12 hr tablet     JANTOVEN 10 MG tablet     buPROPion (WELLBUTRIN SR) 100 MG 12 hr tablet     hydrALAZINE (APRESOLINE) 25 MG tablet     NOVOLOG FLEXPEN 100 UNIT/ML soln     insulin glargine U-300 (TOUJEO) 300 UNIT/ML injection     order for DME     liraglutide (VICTOZA PEN) 18 MG/3ML soln     blood glucose monitoring (ACCU-CHEK FASTCLIX) lancets     diclofenac (VOLTAREN) 1 % GEL     oxyCODONE (ROXICODONE) 10 MG immediate release tablet     warfarin (COUMADIN) 7.5 MG tablet     metoprolol (TOPROL-XL) 50 MG 24 hr tablet     torsemide (DEMADEX) 100 MG tablet     order for DME     capsaicin (ZOSTRIX) 0.025 % CREA     order for DME     nicotine ([employer] NICOTINE) 14 MG/24HR patch 2h hr     blood glucose monitoring (ACCU-CHEK SMARTVIEW) test strip     insulin pen needle (BD RIVER U/F) 32G X 4 MM     topiramate (TOPAMAX) 25 MG tablet     ciclopirox 8 % SOLN     nystatin (MYCOSTATIN) cream     Efinaconazole 10 % SOLN     insulin pen needle (NOVOFINE) 30G X 8 MM     gabapentin (NEURONTIN) 300 MG capsule     spironolactone (ALDACTONE) 50 MG tablet     potassium chloride SA (POTASSIUM CHLORIDE) 20 MEQ tablet     insulin syringe-needle U-100 (BD INSULIN SYRINGE ULTRAFINE) 30G X 1/2\" 0.5 ML     blood glucose monitoring (ONE TOUCH DELICA) lancets     oxyCODONE-acetaminophen (PERCOCET) 5-325 MG " per tablet     blood glucose (ACCU-CHEK RON) test strip     DULoxetine (CYMBALTA) 20 MG capsule     senna (SENOKOT) 8.6 MG tablet     bisacodyl (DULCOLAX) 10 MG suppository     Blood Glucose Monitoring Suppl (ACCU-CHEK RON PLUS) W/DEVICE KIT     tiotropium (SPIRIVA HANDIHALER) 18 MCG inhalation capsule     Respiratory Therapy Supplies (NEBULIZER COMPRESSOR) KIT     ORDER FOR DME     albuterol (PROAIR HFA, PROVENTIL HFA, VENTOLIN HFA) 108 (90 BASE) MCG/ACT inhaler     No current facility-administered medications for this visit.       No flowsheet data found.    ASSESSMENT:   41 y.o. Female here for [facility] follow up.  She is interested in sleeve gastrectomy and struggling to lose 46 lbs before surgery.  She recently restarted topiramate and doesn't feel it is working like it originally did.  She would like to start partial liquid diet.      PLAN:   Increase topiramate to 75mg BID.      Check blood sugar 3-4 times day.    See Isela Archibald Tuesday as planned and possibly adjust insulin due to decrease in carbs with partial liquid diet.      Goals from RD visit:   Follow the Modified Liquid Diet for weight loss:  Breakfast: Protein Shake  Lunch: Protein Shake  Supper: 3 oz lean protein + non-starchy vegetables  Snack: non-starchy vegetables (no calorie-containing dips/condiments)  Beverages: at least 48-64 oz water between meals daily    *Protein Shake Criteria: ~200 Calories, at least 20 grams of protein, and less than 10 grams of sugar     Schedule psych eval soon.    FOLLOW-UP:    4 weeks Steph Kim  Make appt with Dr Ascencio in 3-4 months        Time: 15 min spent on evaluation, management, counseling, education, & motivational interviewing with greater than 50 % of the total time was spent on counseling and coordinating care        Sincerely,    Steph Kim PA-C

## 2017-02-03 NOTE — Clinical Note
"2/3/2017      RE: Pricilla Brown  [address]   [facility] [facility] [address]       Return Medical Weight Management Note     Pricilla Brown  MRN:  [id number]  :  1975  JUANITO:  2/3/2017    Dear Dolores, Jamilah Ramirez,    I had the pleasure of seeing your patient Pricilla Brown.  She is a 41 year old female who I am continuing to see for treatment of obesity related to:    I Have Reviewed The Following Co-Morbidities With The Patient 2016   I have the following co-morbidities associated with obesity: Type II Diabetes, Sleep Apnea, Back Pain   I have the following co-morbidities associated with obesity: Hypothyroidism     Note from Dr Ascencio last note:    1) Type 2 DM, uncontrolled A1C 13. Her barrier is likely from high carb diet, obesity and lack of exercise. Discussed goal of diabetes and weight loss  - given the use of high dose insulin and morbid obesity, I will switch insulin to U-500 insulin starting at 15 units tid  - restart metformin XR 500mg daily    - restart Victoza 1.8mg daily.  - continue to check BG.  - referral to dietician/CDE.  - regular exercise.    - uptodate with eye exams. Last exam in 10/2014. No retinopathy.  - defer urine microalbumins for next visit.    2) Hypothyroidism: secondary to ROGER for Graves'disease.    - check TSH today.  - continue levothyroxine 200 mcg daily.     3) Obesity: morbid obesity with BMI  - restart topiramate 75 mg daily  - refer dietician     Saw 11/15/16 Isela Archibald  \"1.  TYPE 2 DIABETES MELLITUS: Uncontrolled type 2 diabetes mellitus.  Her A1C has improved.  Increase Toujeo 145 units SQ each am and increase Novolog 50 units with meals plus correction.  Continue Victoza 1.8 mg SQ daily.  She does not tolerate Metformin due to GI distress.  Her creat is 0.98  with GFR 75 mL/min today.  She is to check her blood sugar premeals daily and I asked her to send me her blood sugar data weekly via [employer].    She was seen by Oph in May 2016 without " "retinopathy.\"    INTERVAL HISTORY:  Here for [facility] follow up.  She is interested in sleeve gastrectomy.  She has one RD visit left in February. She is working on losing 46 lbs from starting weight of 462.  She is up 4 lbs at 466 lbs today.  Taking Tuojeo  150 units in the morning.  Novolog 55 units plus correction.  Victoza 1.8.  Not taking metformin, couldn't tolerate. She is taking topiramate 50mg BID.  She felt it worked in the past and then she stopped taking it for a while.  Recently restarted topiramate and doesn't feel that it is working now.  She is tolerating topiramate without side effect.s    She is rarely going >200 for Blood sugars.  A1C 10.7 in June 2016.  Recheck next Tuesday.  Fast blood sugar varies but usually 170-190.    No low blood sugars.      CURRENT WEIGHT:   466 lbs 0 oz    Wt Readings from Last 4 Encounters:   02/03/17 466 lb   01/24/17 460 lb 1.6 oz   12/28/16 460 lb   12/28/16 461 lb 9.6 oz       Height:  5' 7.008\"  Body Mass Index:  Body mass index is 72.97 kg/(m^2).  Vitals:  /87 mmHg  Pulse 88  Ht 5' 7.01\"  Wt 466 lb  BMI 72.97 kg/m2  SpO2 98%      Initial consult weight was 466 on bariatric consult.  Total gain is 4 pounds.    No flowsheet data found.    Review of Systems     Constitutional:  Positive for fatigue and increased energy. Negative for fever, chills, weight loss, weight gain, decreased appetite, night sweats, recent stressors, height loss, post-operative complications, incisional pain, hallucinations, hyperactivity and confused.   HENT:  Negative for ear pain, hearing loss, tinnitus, nosebleeds, trouble swallowing, hoarse voice, mouth sores, sore throat, ear discharge, tooth pain, gum tenderness, taste disturbance, smell disturbance, hearing aid, bleeding gums, dry mouth, sinus pain, sinus congestion and neck mass.    Eyes:  Positive for redness, eye watering and eye irritation. Negative for double vision, pain, eye pain, decreased vision, eye bulging, eye " dryness, flashing lights, spots, floaters, strabismus, tunnel vision and jaundice.   Respiratory:   Positive for shortness of breath, sleep disturbances due to breathing, dyspnea on exertion and postural dyspnea. Negative for cough, hemoptysis, sputum production, wheezing, snores loudly, respiratory pain and cough disturbing sleep.    Cardiovascular:  Positive for chest pain, dyspnea on exertion, palpitations, orthopnea, leg swelling, hypertension, chest pain on exertion, leg pain, sleep disturbances due to breathing, tachycardia and edema. Negative for claudication, fingers/toes turn blue, hypotension, syncope, history of heart murmur, chest pain at rest, pacemaker, few scattered varicosities, light-headedness and exercise intolerance.   Gastrointestinal:  Negative for heartburn, nausea, vomiting, abdominal pain, diarrhea, constipation, blood in stool, melena, rectal pain, bloating, hemorrhoids, bowel incontinence, jaundice, rectal bleeding, coffee ground emesis and change in stool.   Genitourinary:  Negative for bladder incontinence, dysuria, urgency, hematuria, flank pain, vaginal discharge, difficulty urinating, genital sores, dyspareunia, decreased libido, nocturia, voiding less frequently, arousal difficulty, abnormal vaginal bleeding, excessive menstruation, menstrual changes, hot flashes, vaginal dryness and postmenopausal bleeding.   Musculoskeletal:  Positive for back pain and muscle weakness. Negative for myalgias, joint swelling, arthralgias, stiffness, muscle cramps, neck pain, bone pain and fracture.   Skin:  Negative for nail changes, itching, poor wound healing, rash, hair changes, skin changes, acne, warts, poor wound healing, scarring, flaky skin, Raynaud's phenomenon, sensitivity to sunlight and skin thickening.   Neurological:  Negative for dizziness, tingling, tremors, speech change, seizures, loss of consciousness, weakness, light-headedness, numbness, headaches, disturbances in coordination,  "extremity numbness, memory loss, difficulty walking and paralysis.   Endo/Heme:  Negative for anemia, swollen glands and bruises/bleeds easily.   Psychiatric/Behavioral:  Negative for depression, hallucinations, memory loss, decreased concentration, mood swings and panic attacks.    Breast:  Negative for breast discharge, breast mass, breast pain and nipple retraction.   Endocrine:  Positive for polyphagia and polydipsia.Negative for altered temperature regulation, unwanted hair growth and change in facial hair.      MEDICATIONS:   Current Outpatient Prescriptions   Medication     polyethylene glycol (MIRALAX/GLYCOLAX) powder     acetaminophen (TYLENOL) 325 MG tablet     levothyroxine (SYNTHROID/LEVOTHROID) 200 MCG tablet     morphine (MS CONTIN) 15 MG 12 hr tablet     JANTOVEN 10 MG tablet     buPROPion (WELLBUTRIN SR) 100 MG 12 hr tablet     hydrALAZINE (APRESOLINE) 25 MG tablet     NOVOLOG FLEXPEN 100 UNIT/ML soln     insulin glargine U-300 (TOUJEO) 300 UNIT/ML injection     order for DME     liraglutide (VICTOZA PEN) 18 MG/3ML soln     blood glucose monitoring (ACCU-CHEK FASTCLIX) lancets     diclofenac (VOLTAREN) 1 % GEL     oxyCODONE (ROXICODONE) 10 MG immediate release tablet     warfarin (COUMADIN) 7.5 MG tablet     metoprolol (TOPROL-XL) 50 MG 24 hr tablet     torsemide (DEMADEX) 100 MG tablet     order for DME     capsaicin (ZOSTRIX) 0.025 % CREA     order for DME     nicotine ([employer] NICOTINE) 14 MG/24HR patch 2h hr     blood glucose monitoring (ACCU-CHEK SMARTVIEW) test strip     insulin pen needle (BD RIVER U/F) 32G X 4 MM     topiramate (TOPAMAX) 25 MG tablet     ciclopirox 8 % SOLN     nystatin (MYCOSTATIN) cream     Efinaconazole 10 % SOLN     insulin pen needle (NOVOFINE) 30G X 8 MM     gabapentin (NEURONTIN) 300 MG capsule     spironolactone (ALDACTONE) 50 MG tablet     potassium chloride SA (POTASSIUM CHLORIDE) 20 MEQ tablet     insulin syringe-needle U-100 (BD INSULIN SYRINGE ULTRAFINE) 30G X 1/2\" " 0.5 ML     blood glucose monitoring (ONE TOUCH DELICA) lancets     oxyCODONE-acetaminophen (PERCOCET) 5-325 MG per tablet     blood glucose (ACCU-CHEK RON) test strip     DULoxetine (CYMBALTA) 20 MG capsule     senna (SENOKOT) 8.6 MG tablet     bisacodyl (DULCOLAX) 10 MG suppository     Blood Glucose Monitoring Suppl (ACCU-CHEK RON PLUS) W/DEVICE KIT     tiotropium (SPIRIVA HANDIHALER) 18 MCG inhalation capsule     Respiratory Therapy Supplies (NEBULIZER COMPRESSOR) KIT     ORDER FOR DME     albuterol (PROAIR HFA, PROVENTIL HFA, VENTOLIN HFA) 108 (90 BASE) MCG/ACT inhaler     No current facility-administered medications for this visit.       No flowsheet data found.    ASSESSMENT:   41 y.o. Female here for [facility] follow up.  She is interested in sleeve gastrectomy and struggling to lose 46 lbs before surgery.  She recently restarted topiramate and doesn't feel it is working like it originally did.  She would like to start partial liquid diet.      PLAN:   Increase topiramate to 75mg BID.      Check blood sugar 3-4 times day.    See Isela Archibald Tuesday as planned and possibly adjust insulin due to decrease in carbs with partial liquid diet.      Goals from RD visit:   Follow the Modified Liquid Diet for weight loss:  Breakfast: Protein Shake  Lunch: Protein Shake  Supper: 3 oz lean protein + non-starchy vegetables  Snack: non-starchy vegetables (no calorie-containing dips/condiments)  Beverages: at least 48-64 oz water between meals daily    *Protein Shake Criteria: ~200 Calories, at least 20 grams of protein, and less than 10 grams of sugar     Schedule psych eval soon.    FOLLOW-UP:    4 weeks Steph Kim  Make appt with Dr Ascencio in 3-4 months        Time: 15 min spent on evaluation, management, counseling, education, & motivational interviewing with greater than 50 % of the total time was spent on counseling and coordinating care    Sincerely,    Steph Kim PA-C

## 2017-02-03 NOTE — MR AVS SNAPSHOT
After Visit Summary   2/3/2017    Pricilla Brown    MRN: [id number]           Patient Information     Date Of Birth          1975        Visit Information        Provider Department      2/3/2017 7:15 AM Steph Kim PA-C [facility]        Today's Diagnoses     Morbid obesity, unspecified obesity type (H)    -  1       Care Instructions    *Protein Shake Criteria: ~200 Calories, at least 20 grams of protein, and less than 10 grams of sugar     Goals from RD visit:   Follow the Modified Liquid Diet for weight loss:  Breakfast: Protein Shake  Lunch: Protein Shake  Supper: 3 oz lean protein + non-starchy vegetables  Snack: non-starchy vegetables (no calorie-containing dips/condiments)  Beverages: at least 48-64 oz water between meals daily      Increase topiramate to 75mg twice daily. Hopefully this will help with evening and nighttime eating.     Make appointment to see Dr Ascencio in 3-4 months    Make appointment to see Steph Kim in 1 month    See Isela Archibald Tuesday and discuss insulin adjustment if needed with partial liquid diet.        Follow-ups after your visit        Your next 10 appointments already scheduled     Feb 06, 2017  7:30 AM   Procedure - 2.5 hour with U2A ROOM 8   [address] [facility] [place] ([facility])    [address] [facility] [address]               Feb 06, 2017  8:30 AM   Heart Cath Right Heart Cath with [email or web address]   [facility] [place]  Heart Cath Lab ([facility])    [address] [facility] [address]   [phone]            Feb 07, 2017 11:30 AM   (Arrive by 11:00 AM)   RETURN DIABETES with SHANE [facility] ([facility])    [address] [facility]  3rd Floor  [facility] [address] [phone]            Feb 13, 2017 11:00 AM   NUTRITION VISIT with Paulette Ruiz RD   [facility]  Surgical Weight Management ([facility])    [address]  [address]th [facility] [address]   [phone]            Feb 22, 2017  3:30 PM   (Arrive by 3:15 PM)   RETURN HEART FAILURE with Percy Alcala MD   [facility] Care ([facility])    [address] [place] [address]  3rd [facility] [address]   [phone]            Feb 27, 2017 10:30 AM   Nurse Visit with  Surgery Nurse   General Surgery ([facility])    [address] [place] [address]  4th [facility] [address]   [phone]            Feb 28, 2017 10:00 AM   Return Sleep Patient with YOCASTA Horan CNP   [facility] [place], Sleep Study ([facility])    [address] [place] [id number] [phone]            Mar 10, 2017 11:00 AM   (Arrive by 10:45 AM)   Return Weight Management Visit with Steph Kim PA-C   [facility] Medical Weight Management ([facility])    [address] [place] [id number] [phone]            May 04, 2017 11:00 AM   Return Visit with Norman Jean DPM   [facility] ([facility])    [address] [place] [id number] [phone]            May 09, 2017 11:15 AM   (Arrive by 11:00 AM)   Return Visit with Silva Damon MD   [facility] Medical Weight Management ([facility])    [address] [place] [address] [phone]              Who to contact     Please call your clinic at [phone] to:    Ask questions about your health    Make or cancel appointments    Discuss your medicines    Learn about your test results    Speak to your doctor   If you have compliments or concerns about an experience at your clinic, or if you wish to file a complaint, please contact [facility]  "Patient Relations at [phone] or email us at [email or web address]         Additional Information About Your Visit        [employer] Information     [employer] gives you secure access to your electronic health record. If you see a primary care provider, you can also send messages to your care team and make appointments. If you have questions, please call your primary care clinic.  If you do not have a primary care provider, please call [phone] and they will assist you.      [employer] is an electronic gateway that provides easy, online access to your medical records. With [employer], you can request a clinic appointment, read your test results, renew a prescription or communicate with your care team.     To access your existing account, please contact your [facility] or call [phone] for assistance.        Care EveryWhere ID     This is your Care EveryWhere ID. This could be used by other organizations to access your [place] medical records  [id number]        Your Vitals Were     Pulse Height BMI (Body Mass Index) Pulse Oximetry          88 1.702 m (5' 7.01\") 72.97 kg/m2 98%         Blood Pressure from Last 3 Encounters:   02/03/17 135/87   02/02/17 130/78   01/24/17 119/77    Weight from Last 3 Encounters:   02/03/17 211.376 kg (466 lb)   01/24/17 208.7 kg (460 lb 1.6 oz)   12/28/16 208.655 kg (460 lb)              Today, you had the following     No orders found for display         Today's Medication Changes          These changes are accurate as of: 2/3/17  7:53 AM.  If you have any questions, ask your nurse or doctor.               These medicines have changed or have updated prescriptions.        Dose/Directions    topiramate 25 MG tablet   Commonly known as:  TOPAMAX   This may have changed:  how much to take   Used for:  Morbid obesity, unspecified obesity type (H)   Changed by:  Steph Kim PA-C        Dose:  75 mg   Take 3 tablets (75 mg) by mouth " 2 times daily   Quantity:  180 tablet   Refills:  3            Where to get your medicines      These medications were sent to [facility] [place], [place] - [address]  [address], [place] [address]    Hours:  TRANSPLANT PHONE NUMBER [phone] Phone:  [phone]    - topiramate 25 MG tablet             Primary Care Provider Office Phone # Fax #    Jamilah Bernal [phone]       [address]  [facility] [address]        Thank you!     Thank you for choosing [facility]  for your care. Our goal is always to provide you with excellent care. Hearing back from our patients is one way we can continue to improve our services. Please take a few minutes to complete the written survey that you may receive in the mail after your visit with us. Thank you!             Your Updated Medication List - Protect others around you: Learn how to safely use, store and throw away your medicines at www.disposemymeds.org.          This list is accurate as of: 2/3/17  7:53 AM.  Always use your most recent med list.                   Brand Name Dispense Instructions for use    acetaminophen 325 MG tablet    TYLENOL    60 tablet    Take 2 tablets (650 mg) by mouth 3 times daily as needed for mild pain or fever (total acetaminophen dose should not exceed 3000 mg per day)       albuterol 108 (90 BASE) MCG/ACT Inhaler    PROAIR HFA/PROVENTIL HFA/VENTOLIN HFA    1 Inhaler    Inhale 2 puffs into the lungs every 6 hours as needed.       bisacodyl 10 MG Suppository    DULCOLAX    6 suppository    Place 1 suppository (10 mg) rectally daily as needed for constipation       * blood glucose monitoring lancets     1 Box    Use to test blood sugars 4 times daily or as directed.       * blood glucose monitoring lancets     4 Box    Use to test blood sugar 4 times daily or as directed.       blood glucose monitoring meter  "device kit     1 kit    Use to test blood sugars 2 times daily or as directed.       * blood glucose monitoring test strip    ACCU-CHEK RON    100 strip    Use to test blood sugars 2 times daily or as directed.       * blood glucose monitoring test strip    ACCU-CHEK SMARTVIEW    450 each    Use to test blood sugar 5 times daily       buPROPion 100 MG 12 hr tablet    WELLBUTRIN SR    60 tablet    Take 1 tablet (100 mg) by mouth 2 times daily       capsaicin 0.025 % Crea cream    ZOSTRIX    1 Tube    Apply 1 g topically 3 times daily as needed       ciclopirox 8 % Soln     1 Bottle    Externally apply topically daily To toenails.       diclofenac 1 % Gel topical gel    VOLTAREN    100 g    Place 4 g onto the skin 4 times daily       DULoxetine 20 MG EC capsule    CYMBALTA    60 capsule    Take 1 capsule (20 mg) by mouth 2 times daily       Efinaconazole 10 % Soln     8 mL    Externally apply topically daily To toenails.       gabapentin 300 MG capsule    NEURONTIN    120 capsule    Take 2 capsules (600 mg) by mouth 2 times daily       hydrALAZINE 25 MG tablet    APRESOLINE    150 tablet    Take 1 tab (25 mg) in am, 2 tabs (50 mg) midday, and 2 tabs (50 mg) in pm daily       insulin glargine U-300 300 UNIT/ML injection    TOUJEO    15 mL    Inject 150 units SQ each am.       * insulin pen needle 30G X 8 MM    NOVOFINE    100 each    Use 2X daily or as directed.       * insulin pen needle 32G X 4 MM    BD RIVER U/F    200 each    Use 6 daily or as directed.       insulin syringe-needle U-100 30G X 1/2\" 0.5 ML    BD insulin syringe ULTRAFINE    100 each    Use one syringe 3 daily or as directed.       levothyroxine 200 MCG tablet    SYNTHROID/LEVOTHROID    90 tablet    Take 1 tablet (200 mcg) by mouth daily       liraglutide 18 MG/3ML soln    VICTOZA PEN    27 mL    Inject 1.8 mg Subcutaneous daily       metoprolol 50 MG 24 hr tablet    TOPROL-XL    90 tablet    Take 0.5 tablets (25 mg) by mouth At Bedtime       " morphine 15 MG 12 hr tablet    MS CONTIN     Take 15 mg by mouth daily as needed       Nebulizer Compressor Kit     1 kit    1 Device 4 times daily as needed.       nicotine 14 MG/24HR 24 hr patch    [employer] NICOTINE    30 patch    Place 1 patch onto the skin every 24 hours       NovoLOG FLEXPEN 100 UNIT/ML injection   Generic drug:  insulin aspart     60 mL    Inject 55 units with meals plus correction. Pt uses approx 180 units in 24 hrs.       nystatin cream    MYCOSTATIN    90 g    Apply topically 2 times daily To toenails       order for DME     1 each    Use your CPAP device as directed by your provider. Pressure change to min 13 max 18cwp       * order for DME     1 each    Equipment being ordered: Challenger Wide walker if available - patient needs seat, basket and brakes.       * order for DME     1 each    Equipment being ordered:  [id number] $92  Plantar, fasciitis, LG, night splint       * order for DME     1 Units    Left foot       oxyCODONE 10 MG IR tablet    ROXICODONE    12 tablet    Take 1 tablet (10 mg) by mouth every 6 hours as needed for moderate to severe pain       oxyCODONE-acetaminophen 5-325 MG per tablet    PERCOCET    60 tablet    Take 1 tablet by mouth every 8 hours as needed for moderate to severe pain (try to limit use, no further prescriptions until seen in pain clinic)       polyethylene glycol powder    MIRALAX/GLYCOLAX         potassium chloride SA 20 MEQ CR tablet    potassium chloride    240 tablet    Take 2 tablets (40 mEq) by mouth 4 times daily       senna 8.6 MG tablet    SENOKOT    45 tablet    Take 1 tablet by mouth 2 times daily       spironolactone 50 MG tablet    ALDACTONE    30 tablet    Take 1 tablet (50 mg) by mouth daily       tiotropium 18 MCG capsule    SPIRIVA HANDIHALER    30 capsule    Inhale contents of one capsule daily.       topiramate 25 MG tablet    TOPAMAX    180 tablet    Take 3 tablets (75 mg) by mouth 2 times daily       torsemide 100 MG tablet     DEMADEX    180 tablet    Take 1 tablet (100 mg) by mouth 2 times daily       * warfarin 7.5 MG tablet    COUMADIN    30 tablet    Take 15 mg PO daily on 10/20, 10/21, 10/22. Check INR on 10/23. Further dosing per INR level.       * JANTOVEN 10 MG tablet   Generic drug:  warfarin      Take 10 mg by mouth daily as needed       * Notice:  This list has 11 medication(s) that are the same as other medications prescribed for you. Read the directions carefully, and ask your doctor or other care provider to review them with you.

## 2017-02-06 ENCOUNTER — APPOINTMENT (OUTPATIENT)
Dept: CARDIOLOGY | Facility: CLINIC | Age: 42
End: 2017-02-06
Attending: INTERNAL MEDICINE
Payer: MEDICARE

## 2017-02-06 ENCOUNTER — ANTICOAGULATION THERAPY VISIT (OUTPATIENT)
Dept: ANTICOAGULATION | Facility: CLINIC | Age: 42
End: 2017-02-06

## 2017-02-06 ENCOUNTER — APPOINTMENT (OUTPATIENT)
Dept: MEDSURG UNIT | Facility: CLINIC | Age: 42
End: 2017-02-06
Payer: MEDICARE

## 2017-02-06 DIAGNOSIS — I48.91 ATRIAL FIBRILLATION, UNSPECIFIED TYPE (H): Primary | ICD-10-CM

## 2017-02-06 DIAGNOSIS — Z79.01 LONG-TERM (CURRENT) USE OF ANTICOAGULANTS: ICD-10-CM

## 2017-02-06 PROCEDURE — 93451 RIGHT HEART CATH: CPT | Mod: 26 | Performed by: INTERNAL MEDICINE

## 2017-02-06 NOTE — PROGRESS NOTES
ANTICOAGULATION FOLLOW-UP CLINIC VISIT    Patient Name:  Pricilla Brown  Date:  2/6/2017  Contact Type:  Telephone    SUBJECTIVE:     Patient Findings     Comments Pt had a successful Rt heart cath and she reports the MD said she can restart her Warfarin tonight           OBJECTIVE    INR POINT OF CARE   Date Value Ref Range Status   02/06/2017 2.4* 0.86 - 1.14 Final     CHROMOGENIC FACTOR 10   Date Value Ref Range Status   10/18/2016 20* 70 - 130 % Final     Comment:     Therapeutic Range:  A Chromogenic Factor 10 level of approximately 20-40%   inversely correlates with an INR of 2-3 for patients receiving Warfarin.   Chromogenic Factor 10 levels below 20% indicate an INR greater than 3 and   levels above 40% indicate an INR less than 2.         ASSESSMENT / PLAN  INR assessment THER    Recheck INR In: 2 WEEKS    INR Location Clinic      Anticoagulation Summary as of 2/6/2017     INR goal 2.0-2.5   Selected INR 2.4 (2/6/2017)   Maintenance plan 20 mg (5 mg x 4) on Mon, Wed, Fri; 15 mg (5 mg x 3) all other days   Full instructions 20 mg on Mon, Wed, Fri; 15 mg all other days   Weekly total 120 mg   Plan last modified Dorene Edge RN (1/13/2017)   Next INR check 2/22/2017   Priority INR   Target end date Indefinite    Indications   Atrial fibrillation (H) [I48.91] [I48.91]  Long-term (current) use of anticoagulants [Z79.01] [Z79.01]         Anticoagulation Episode Summary     INR check location Clinic Lab    Preferred lab     Send INR reminders to [facility]    Comments Contact Patient at [phone]      Anticoagulation Care Providers     Provider Role Specialty Phone number    Percy Alcala MD  Cardiology [phone]            See the Encounter Report to view Anticoagulation Flowsheet and Dosing Calendar (Go to Encounters tab in chart review, and find the Anticoagulation Therapy Visit)    Spoke with Pricilla Kebede, RN

## 2017-02-07 ENCOUNTER — OFFICE VISIT (OUTPATIENT)
Dept: ENDOCRINOLOGY | Facility: CLINIC | Age: 42
End: 2017-02-07

## 2017-02-07 VITALS
HEART RATE: 90 BPM | SYSTOLIC BLOOD PRESSURE: 149 MMHG | BODY MASS INDEX: 45.99 KG/M2 | WEIGHT: 293 LBS | HEIGHT: 67 IN | DIASTOLIC BLOOD PRESSURE: 91 MMHG

## 2017-02-07 DIAGNOSIS — E11.65 TYPE 2 DIABETES MELLITUS WITH HYPERGLYCEMIA, WITH LONG-TERM CURRENT USE OF INSULIN (H): Primary | ICD-10-CM

## 2017-02-07 DIAGNOSIS — Z79.4 TYPE 2 DIABETES MELLITUS WITH HYPERGLYCEMIA, WITH LONG-TERM CURRENT USE OF INSULIN (H): Primary | ICD-10-CM

## 2017-02-07 DIAGNOSIS — I25.10 CAD (CORONARY ARTERY DISEASE): Primary | ICD-10-CM

## 2017-02-07 DIAGNOSIS — I50.32 CHRONIC DIASTOLIC HEART FAILURE (H): Primary | ICD-10-CM

## 2017-02-07 LAB — HBA1C MFR BLD: 8.8 % (ref 4.3–6)

## 2017-02-07 RX ORDER — INSULIN ASPART 100 [IU]/ML
INJECTION, SOLUTION INTRAVENOUS; SUBCUTANEOUS
Qty: 60 ML | Refills: 11 | COMMUNITY
Start: 2017-02-07 | End: 2018-02-07

## 2017-02-07 RX ORDER — TORSEMIDE 100 MG/1
100 TABLET ORAL 2 TIMES DAILY
Qty: 180 TABLET | Refills: 1 | Status: SHIPPED | OUTPATIENT
Start: 2017-02-07 | End: 2017-11-24

## 2017-02-07 NOTE — PROGRESS NOTES
HPI  Pricilla Brown is a 41 year old female with type 2 diabetes mellitus here today for a follow up visit.  Pt's hx is also significant for morbid obesity, dilated cardiomyopathy with diastolic dysfunction, atrial fibrillation, hx of PE, HTN, Grave's disease and sleep apnea.  For her diabetes, pt is taking Victoza 1.8 mg SQ daily, Toujeo 155 units SQ each am and Novolog 55 units with meals, plus correction insulin.  Her A1C is 8.8 % today.  Her previous A1C was 9.0 % in Nov 2016 and her A1C was 10.7 % in 6/2016 .  Pricilla had an A1C value of 12.1 % on 3/10/2016.  We downloaded her glucose meter today and her blood sugar values have improved, but remains above target.   Her average glucose was 215 with SD 67  Pt's FBS values have been in the 166-220 range; prelunch blood sugars are in the 109-248 range and her predinner blood sugar are in the 102-211 range.  No hypoglycemia.  On ROS today, she states she is feeling well.  She no longer has left foot pain.    SOB with exertion which is chronic for patient.  She has less blurred vision.  No n/v, cough, chest pain or abd pain.  She denies diarrhea, dysuria, hematuria and no foot ulcers.  Some tingling in both feet.    ROS  Please see under HPI.    Allergies  Allergies   Allergen Reactions     Penicillins Other (See Comments)     CHILDHOOD ALLERGY     Ibuprofen Sodium Hives and Rash       Medications  Current Outpatient Prescriptions   Medication Sig Dispense Refill     torsemide (DEMADEX) 100 MG tablet Take 1 tablet (100 mg) by mouth 2 times daily 180 tablet 1     polyethylene glycol (MIRALAX/GLYCOLAX) powder        topiramate (TOPAMAX) 25 MG tablet Take 3 tablets (75 mg) by mouth 2 times daily 180 tablet 3     acetaminophen (TYLENOL) 325 MG tablet Take 2 tablets (650 mg) by mouth 3 times daily as needed for mild pain or fever (total acetaminophen dose should not exceed 3000 mg per day) 60 tablet 0     levothyroxine (SYNTHROID/LEVOTHROID) 200 MCG tablet Take 1 tablet (200  mcg) by mouth daily 90 tablet 3     morphine (MS CONTIN) 15 MG 12 hr tablet Take 15 mg by mouth daily as needed       JANTOVEN 10 MG tablet Take 10 mg by mouth daily as needed       buPROPion (WELLBUTRIN SR) 100 MG 12 hr tablet Take 1 tablet (100 mg) by mouth 2 times daily 60 tablet 3     hydrALAZINE (APRESOLINE) 25 MG tablet Take 1 tab (25 mg) in am, 2 tabs (50 mg) midday, and 2 tabs (50 mg) in pm daily 150 tablet 3     NOVOLOG FLEXPEN 100 UNIT/ML soln Inject 55 units with meals plus correction. Pt uses approx 180 units in 24 hrs. 60 mL 11     insulin glargine U-300 (TOUJEO) 300 UNIT/ML injection Inject 150 units SQ each am. 15 mL 3     order for DME Left foot 1 Units 0     liraglutide (VICTOZA PEN) 18 MG/3ML soln Inject 1.8 mg Subcutaneous daily 27 mL 3     blood glucose monitoring (ACCU-CHEK FASTCLIX) lancets Use to test blood sugar 4 times daily or as directed. 4 Box 2     diclofenac (VOLTAREN) 1 % GEL Place 4 g onto the skin 4 times daily 100 g 0     oxyCODONE (ROXICODONE) 10 MG immediate release tablet Take 1 tablet (10 mg) by mouth every 6 hours as needed for moderate to severe pain 12 tablet 0     warfarin (COUMADIN) 7.5 MG tablet Take 15 mg PO daily on 10/20, 10/21, 10/22. Check INR on 10/23. Further dosing per INR level. 30 tablet 0     metoprolol (TOPROL-XL) 50 MG 24 hr tablet Take 0.5 tablets (25 mg) by mouth At Bedtime 90 tablet 3     order for DME Equipment being ordered:  8219-3260 $92   Plantar, fasciitis, LG, night splint 1 each 0     capsaicin (ZOSTRIX) 0.025 % CREA Apply 1 g topically 3 times daily as needed 1 Tube 0     order for DME Equipment being ordered: [employer] walker if available - patient needs seat, basket and brakes. 1 each 0     nicotine ([employer] NICOTINE) 14 MG/24HR patch 2h hr Place 1 patch onto the skin every 24 hours 30 patch 1     blood glucose monitoring (ACCU-CHEK SMARTVIEW) test strip Use to test blood sugar 5 times daily 450 each 3     insulin pen needle (BD RIVER  "U/F) 32G X 4 MM Use 6 daily or as directed. 200 each 11     ciclopirox 8 % SOLN Externally apply topically daily To toenails. 1 Bottle 11     nystatin (MYCOSTATIN) cream Apply topically 2 times daily To toenails 90 g 6     Efinaconazole 10 % SOLN Externally apply topically daily To toenails. 8 mL 11     insulin pen needle (NOVOFINE) 30G X 8 MM Use 2X daily or as directed. 100 each 3     gabapentin (NEURONTIN) 300 MG capsule Take 2 capsules (600 mg) by mouth 2 times daily 120 capsule 5     spironolactone (ALDACTONE) 50 MG tablet Take 1 tablet (50 mg) by mouth daily 30 tablet 11     potassium chloride SA (POTASSIUM CHLORIDE) 20 MEQ tablet Take 2 tablets (40 mEq) by mouth 4 times daily 240 tablet 11     insulin syringe-needle U-100 (BD INSULIN SYRINGE ULTRAFINE) 30G X 1/2\" 0.5 ML Use one syringe 3 daily or as directed. 100 each prn     blood glucose monitoring (ONE TOUCH DELICA) lancets Use to test blood sugars 4 times daily or as directed. 1 Box prn     oxyCODONE-acetaminophen (PERCOCET) 5-325 MG per tablet Take 1 tablet by mouth every 8 hours as needed for moderate to severe pain (try to limit use, no further prescriptions until seen in pain clinic) 60 tablet 0     blood glucose (ACCU-CHEK RON) test strip Use to test blood sugars 2 times daily or as directed. 100 strip 11     DULoxetine (CYMBALTA) 20 MG capsule Take 1 capsule (20 mg) by mouth 2 times daily 60 capsule 0     senna (SENOKOT) 8.6 MG tablet Take 1 tablet by mouth 2 times daily 45 tablet 0     bisacodyl (DULCOLAX) 10 MG suppository Place 1 suppository (10 mg) rectally daily as needed for constipation 6 suppository 0     Blood Glucose Monitoring Suppl (ACCU-CHEK RON PLUS) W/DEVICE KIT Use to test blood sugars 2 times daily or as directed. 1 kit 0     tiotropium (SPIRIVA HANDIHALER) 18 MCG inhalation capsule Inhale contents of one capsule daily. 30 capsule 1     Respiratory Therapy Supplies (NEBULIZER COMPRESSOR) KIT 1 Device 4 times daily as needed. 1 " "kit 3     ORDER FOR DME Use your CPAP device as directed by your provider. Pressure change to min 13 max 18cwp 1 each 99     albuterol (PROAIR HFA, PROVENTIL HFA, VENTOLIN HFA) 108 (90 BASE) MCG/ACT inhaler Inhale 2 puffs into the lungs every 6 hours as needed. 1 Inhaler 11     [DISCONTINUED] torsemide (DEMADEX) 100 MG tablet Take 1 tablet (100 mg) by mouth 2 times daily 180 tablet 1       Family History  family history includes CEREBROVASCULAR DISEASE in her mother; DIABETES in her brother, mother, and sister; HEART DISEASE in her brother, father, mother, and sister; Hypertension in her mother; Psychotic Disorder in her brother; Thyroid Disease in her brother, maternal aunt, maternal uncle, mother, sister, sister, and sister. There is no history of CANCER, Glaucoma, or Macular Degeneration.    Social History  Smoke: yes.  ETOH: rare.      Past Medical History  Past Medical History   Diagnosis Date     A-fib (H) 2011     on coumadin since 1/13     Diabetes mellitus (H) 2012     Hyperthyroidism      Graves, s/p I131 1/13, now on prednisone and methimazole     Morbid obesity (H)      HTN (hypertension)      Sleep apnea      using CPAP     Asthma      as a kid     Dilated cardiomyopathy (H) 1/8/2013     Pulmonary embolism (H) 1/12     hospitalized in [place], on lovenox/coumadin for a few months but stopped, hypercoag w/u neg per pt     Diastolic heart failure 2/15/2013     Chronic anticoagulation for a-fib 2/15/2013     INR's followed by coumadin clinic at      Chest pain 2/1/2017         Physical Exam  /91 mmHg  Pulse 90  Ht 1.702 m (5' 7\")  Wt 211.875 kg (467 lb 1.6 oz)  BMI 73.14 kg/m2  Body mass index is 73.14 kg/(m^2).      RESULTS  CREATININE   Date Value Ref Range Status   01/24/2017 0.96 0.52 - 1.04 mg/dL Final     GFR ESTIMATE   Date Value Ref Range Status   01/24/2017 63 >60 mL/min/1.7m2 Final     Comment:     Non  GFR Calc     HEMOGLOBIN A1C   Date Value Ref Range Status   03/10/2016 " 12.1* 4.3 - 6.0 % Final     POTASSIUM   Date Value Ref Range Status   01/24/2017 3.4 3.4 - 5.3 mmol/L Final     ALT   Date Value Ref Range Status   10/18/2016 28 0 - 50 U/L Final     AST   Date Value Ref Range Status   10/18/2016 13 0 - 45 U/L Final     TSH   Date Value Ref Range Status   10/08/2015 0.40 0.40 - 4.00 mU/L Final     T4 FREE   Date Value Ref Range Status   07/14/2015 0.98 0.76 - 1.46 ng/dL Final       CHOLESTEROL   Date Value Ref Range Status   10/26/2016 135 <200 mg/dL Final   07/14/2015 164 <200 mg/dL Final     Comment:     LDL Cholesterol is the primary guide to therapy.   The NCEP recommends further evaluation of: patients with cholesterol greater   than 200 mg/dL if additional risk factors are present, cholesterol greater   than   240 mg/dL, triglycerides greater than 150 mg/dL, or HDL less than 40 mg/dL.       HDL CHOLESTEROL   Date Value Ref Range Status   10/26/2016 37* >49 mg/dL Final   07/14/2015 49* >50 mg/dL Final     LDL CHOLESTEROL CALCULATED   Date Value Ref Range Status   10/26/2016 68 <100 mg/dL Final     Comment:     Desirable:       <100 mg/dl   07/14/2015 90 0 - 129 mg/dL Final     Comment:     LDL Cholesterol is the primary guide to therapy: LDL-cholesterol goal in high   risk patients is <100 mg/dL and in very high risk patients is <70 mg/dL.       TRIGLYCERIDES   Date Value Ref Range Status   10/26/2016 151* <150 mg/dL Final     Comment:     Borderline high:  150-199 mg/dl   High:             200-499 mg/dl   Very high:       >499 mg/dl   Non Fasting     07/14/2015 123 0 - 150 mg/dL Final     CHOLESTEROL/HDL RATIO   Date Value Ref Range Status   07/14/2015 3.4 0.0 - 5.0 Final   01/06/2013 3.6 0.0 - 5.0 Final     A1C  8.8     2/7/2017  A1C  9.0    11/10/2016  A1C 10.7   6/16/2016  A1C 12.1   3/10/2016    ASSESSMENT/PLAN:    1.  TYPE 2 DIABETES MELLITUS: Uncontrolled type 2 diabetes mellitus.  Her A1C continues to improve.  Increase Toujeo 160 units SQ each am and increase Novolog  60 units with meals plus correction.  Continue Victoza 1.8 mg SQ daily.  She does not tolerate Metformin due to GI distress.  Her creat is 0.96  with GFR 63 mL/min in Jan 2017.  She is to check her blood sugar premeals daily and I asked her to send me her blood sugar data in 1 week via [employer].   She was seen by [facility] in May 2016 without retinopathy.  Pt refuses the flu vaccine today.  I placed an order for labs to be done today.    Pt to test 4x daily related to fluctuating glucose results.    2.  HTN: She has not taken her BP meds yet today.  Continue current RXs.    3.  CARDIOMYOPATHY/A FIB:  Pt followed here by Cardiology staff.      4. GRAVE'S DISEASE:  Hx of Grave's disease s/p I 131 in Jan 2013.    Pt became hypothyroid following above treatment and is taking Levothyroxine 200 mcg po daily.  Most recent TSH was normal in Oct 2015.  Check TSH/FT4 today.    5.  OBESITY:  Morbid obesity with BMI > 70 kg/m2.  [facility].    6.  Return to Endocrine Clinic in 3 months.  I asked Pricilla to send me blood sugar readings via [employer] weekly so I can continue to make insulin adjustments if needed.    Pt is to check her blood sugar 5 times day. She is on insulin and has large blood sugar swings.  Isela Archibald PA-C

## 2017-02-07 NOTE — NURSING NOTE
"Performed A1C test - patient tolerated well.    Fariha Simms CMA         Chief Complaint   Patient presents with     RECHECK     f/u type 2 DM        Initial /91 mmHg  Pulse 90  Ht 1.702 m (5' 7\")  Wt 211.875 kg (467 lb 1.6 oz)  BMI 73.14 kg/m2 Estimated body mass index is 73.14 kg/(m^2) as calculated from the following:    Height as of this encounter: 1.702 m (5' 7\").    Weight as of this encounter: 211.875 kg (467 lb 1.6 oz).  Medication Reconciliation: complete       Fariha Simms cma     "

## 2017-02-07 NOTE — Clinical Note
2/7/2017       RE: Pricilla Brown  [address] [place] [id number]     Dear Colleague,    Thank you for referring your patient, Pricilla Brown, to the [facility] ENDOCRINOLOGY at [facility]. Please see a copy of my visit note below.    HPI  Pricilla Brown is a 41 year old female with type 2 diabetes mellitus here today for a follow up visit.  Pt's hx is also significant for morbid obesity, dilated cardiomyopathy with diastolic dysfunction, atrial fibrillation, hx of PE, HTN, Grave's disease and sleep apnea.  For her diabetes, pt is taking Victoza 1.8 mg SQ daily, Toujeo 155 units SQ each am and Novolog 55 units with meals, plus correction insulin.  Her A1C is 8.8 % today.  Her previous A1C was 9.0 % in Nov 2016 and her A1C was 10.7 % in 6/2016 .  Pricilla had an A1C value of 12.1 % on 3/10/2016.  We downloaded her glucose meter today and her blood sugar values have improved, but remains above target.   Her average glucose was 215 with SD 67  Pt's FBS values have been in the 166-220 range; prelunch blood sugars are in the 109-248 range and her predinner blood sugar are in the 102-211 range.  No hypoglycemia.  On ROS today, she states she is feeling well.  She no longer has left foot pain.    SOB with exertion which is chronic for patient.  She has less blurred vision.  No n/v, cough, chest pain or abd pain.  She denies diarrhea, dysuria, hematuria and no foot ulcers.  Some tingling in both feet.    ROS  Please see under HPI.    Allergies  Allergies   Allergen Reactions     Penicillins Other (See Comments)     CHILDHOOD ALLERGY     Ibuprofen Sodium Hives and Rash       Medications  Current Outpatient Prescriptions   Medication Sig Dispense Refill     torsemide (DEMADEX) 100 MG tablet Take 1 tablet (100 mg) by mouth 2 times daily 180 tablet 1     polyethylene glycol (MIRALAX/GLYCOLAX) powder        topiramate (TOPAMAX) 25 MG tablet Take 3 tablets (75 mg) by mouth 2 times  daily 180 tablet 3     acetaminophen (TYLENOL) 325 MG tablet Take 2 tablets (650 mg) by mouth 3 times daily as needed for mild pain or fever (total acetaminophen dose should not exceed 3000 mg per day) 60 tablet 0     levothyroxine (SYNTHROID/LEVOTHROID) 200 MCG tablet Take 1 tablet (200 mcg) by mouth daily 90 tablet 3     morphine (MS CONTIN) 15 MG 12 hr tablet Take 15 mg by mouth daily as needed       JANTOVEN 10 MG tablet Take 10 mg by mouth daily as needed       buPROPion (WELLBUTRIN SR) 100 MG 12 hr tablet Take 1 tablet (100 mg) by mouth 2 times daily 60 tablet 3     hydrALAZINE (APRESOLINE) 25 MG tablet Take 1 tab (25 mg) in am, 2 tabs (50 mg) midday, and 2 tabs (50 mg) in pm daily 150 tablet 3     NOVOLOG FLEXPEN 100 UNIT/ML soln Inject 55 units with meals plus correction. Pt uses approx 180 units in 24 hrs. 60 mL 11     insulin glargine U-300 (TOUJEO) 300 UNIT/ML injection Inject 150 units SQ each am. 15 mL 3     order for DME Left foot 1 Units 0     liraglutide (VICTOZA PEN) 18 MG/3ML soln Inject 1.8 mg Subcutaneous daily 27 mL 3     blood glucose monitoring (ACCU-CHEK FASTCLIX) lancets Use to test blood sugar 4 times daily or as directed. 4 Box 2     diclofenac (VOLTAREN) 1 % GEL Place 4 g onto the skin 4 times daily 100 g 0     oxyCODONE (ROXICODONE) 10 MG immediate release tablet Take 1 tablet (10 mg) by mouth every 6 hours as needed for moderate to severe pain 12 tablet 0     warfarin (COUMADIN) 7.5 MG tablet Take 15 mg PO daily on 10/20, 10/21, 10/22. Check INR on 10/23. Further dosing per INR level. 30 tablet 0     metoprolol (TOPROL-XL) 50 MG 24 hr tablet Take 0.5 tablets (25 mg) by mouth At Bedtime 90 tablet 3     order for DME Equipment being ordered:  0586-7672 $92   Plantar, fasciitis, LG, night splint 1 each 0     capsaicin (ZOSTRIX) 0.025 % CREA Apply 1 g topically 3 times daily as needed 1 Tube 0     order for DME Equipment being ordered: [employer] walker if available - patient  "needs seat, basket and brakes. 1 each 0     nicotine ([employer] NICOTINE) 14 MG/24HR patch 2h hr Place 1 patch onto the skin every 24 hours 30 patch 1     blood glucose monitoring (ACCU-CHEK SMARTVIEW) test strip Use to test blood sugar 5 times daily 450 each 3     insulin pen needle (BD RIVER U/F) 32G X 4 MM Use 6 daily or as directed. 200 each 11     ciclopirox 8 % SOLN Externally apply topically daily To toenails. 1 Bottle 11     nystatin (MYCOSTATIN) cream Apply topically 2 times daily To toenails 90 g 6     Efinaconazole 10 % SOLN Externally apply topically daily To toenails. 8 mL 11     insulin pen needle (NOVOFINE) 30G X 8 MM Use 2X daily or as directed. 100 each 3     gabapentin (NEURONTIN) 300 MG capsule Take 2 capsules (600 mg) by mouth 2 times daily 120 capsule 5     spironolactone (ALDACTONE) 50 MG tablet Take 1 tablet (50 mg) by mouth daily 30 tablet 11     potassium chloride SA (POTASSIUM CHLORIDE) 20 MEQ tablet Take 2 tablets (40 mEq) by mouth 4 times daily 240 tablet 11     insulin syringe-needle U-100 (BD INSULIN SYRINGE ULTRAFINE) 30G X 1/2\" 0.5 ML Use one syringe 3 daily or as directed. 100 each prn     blood glucose monitoring (ONE TOUCH DELICA) lancets Use to test blood sugars 4 times daily or as directed. 1 Box prn     oxyCODONE-acetaminophen (PERCOCET) 5-325 MG per tablet Take 1 tablet by mouth every 8 hours as needed for moderate to severe pain (try to limit use, no further prescriptions until seen in pain clinic) 60 tablet 0     blood glucose (ACCU-CHEK RON) test strip Use to test blood sugars 2 times daily or as directed. 100 strip 11     DULoxetine (CYMBALTA) 20 MG capsule Take 1 capsule (20 mg) by mouth 2 times daily 60 capsule 0     senna (SENOKOT) 8.6 MG tablet Take 1 tablet by mouth 2 times daily 45 tablet 0     bisacodyl (DULCOLAX) 10 MG suppository Place 1 suppository (10 mg) rectally daily as needed for constipation 6 suppository 0     Blood Glucose Monitoring Suppl (ACCU-CHEK RON " "PLUS) W/DEVICE KIT Use to test blood sugars 2 times daily or as directed. 1 kit 0     tiotropium (SPIRIVA HANDIHALER) 18 MCG inhalation capsule Inhale contents of one capsule daily. 30 capsule 1     Respiratory Therapy Supplies (NEBULIZER COMPRESSOR) KIT 1 Device 4 times daily as needed. 1 kit 3     ORDER FOR DME Use your CPAP device as directed by your provider. Pressure change to min 13 max 18cwp 1 each 99     albuterol (PROAIR HFA, PROVENTIL HFA, VENTOLIN HFA) 108 (90 BASE) MCG/ACT inhaler Inhale 2 puffs into the lungs every 6 hours as needed. 1 Inhaler 11     [DISCONTINUED] torsemide (DEMADEX) 100 MG tablet Take 1 tablet (100 mg) by mouth 2 times daily 180 tablet 1       Family History  family history includes CEREBROVASCULAR DISEASE in her mother; DIABETES in her brother, mother, and sister; HEART DISEASE in her brother, father, mother, and sister; Hypertension in her mother; Psychotic Disorder in her brother; Thyroid Disease in her brother, maternal aunt, maternal uncle, mother, sister, sister, and sister. There is no history of CANCER, Glaucoma, or Macular Degeneration.    Social History  Smoke: yes.  ETOH: rare.      Past Medical History  Past Medical History   Diagnosis Date     A-fib (H) 2011     on coumadin since 1/13     Diabetes mellitus (H) 2012     Hyperthyroidism      Graves, s/p I131 1/13, now on prednisone and methimazole     Morbid obesity (H)      HTN (hypertension)      Sleep apnea      using CPAP     Asthma      as a kid     Dilated cardiomyopathy (H) 1/8/2013     Pulmonary embolism (H) 1/12     hospitalized in [place], on lovenox/coumadin for a few months but stopped, hypercoag w/u neg per pt     Diastolic heart failure 2/15/2013     Chronic anticoagulation for a-fib 2/15/2013     INR's followed by coumadin clinic at      Chest pain 2/1/2017         Physical Exam  /91 mmHg  Pulse 90  Ht 1.702 m (5' 7\")  Wt 211.875 kg (467 lb 1.6 oz)  BMI 73.14 kg/m2  Body mass index is 73.14 " kg/(m^2).      RESULTS  CREATININE   Date Value Ref Range Status   01/24/2017 0.96 0.52 - 1.04 mg/dL Final     GFR ESTIMATE   Date Value Ref Range Status   01/24/2017 63 >60 mL/min/1.7m2 Final     Comment:     Non  GFR Calc     HEMOGLOBIN A1C   Date Value Ref Range Status   03/10/2016 12.1* 4.3 - 6.0 % Final     POTASSIUM   Date Value Ref Range Status   01/24/2017 3.4 3.4 - 5.3 mmol/L Final     ALT   Date Value Ref Range Status   10/18/2016 28 0 - 50 U/L Final     AST   Date Value Ref Range Status   10/18/2016 13 0 - 45 U/L Final     TSH   Date Value Ref Range Status   10/08/2015 0.40 0.40 - 4.00 mU/L Final     T4 FREE   Date Value Ref Range Status   07/14/2015 0.98 0.76 - 1.46 ng/dL Final       CHOLESTEROL   Date Value Ref Range Status   10/26/2016 135 <200 mg/dL Final   07/14/2015 164 <200 mg/dL Final     Comment:     LDL Cholesterol is the primary guide to therapy.   The NCEP recommends further evaluation of: patients with cholesterol greater   than 200 mg/dL if additional risk factors are present, cholesterol greater   than   240 mg/dL, triglycerides greater than 150 mg/dL, or HDL less than 40 mg/dL.       HDL CHOLESTEROL   Date Value Ref Range Status   10/26/2016 37* >49 mg/dL Final   07/14/2015 49* >50 mg/dL Final     LDL CHOLESTEROL CALCULATED   Date Value Ref Range Status   10/26/2016 68 <100 mg/dL Final     Comment:     Desirable:       <100 mg/dl   07/14/2015 90 0 - 129 mg/dL Final     Comment:     LDL Cholesterol is the primary guide to therapy: LDL-cholesterol goal in high   risk patients is <100 mg/dL and in very high risk patients is <70 mg/dL.       TRIGLYCERIDES   Date Value Ref Range Status   10/26/2016 151* <150 mg/dL Final     Comment:     Borderline high:  150-199 mg/dl   High:             200-499 mg/dl   Very high:       >499 mg/dl   Non Fasting     07/14/2015 123 0 - 150 mg/dL Final     CHOLESTEROL/HDL RATIO   Date Value Ref Range Status   07/14/2015 3.4 0.0 - 5.0 Final    01/06/2013 3.6 0.0 - 5.0 Final     A1C  8.8     2/7/2017  A1C  9.0    11/10/2016  A1C 10.7   6/16/2016  A1C 12.1   3/10/2016    ASSESSMENT/PLAN:    1.  TYPE 2 DIABETES MELLITUS: Uncontrolled type 2 diabetes mellitus.  Her A1C continues to improve.  Increase Toujeo 160 units SQ each am and increase Novolog 60 units with meals plus correction.  Continue Victoza 1.8 mg SQ daily.  She does not tolerate Metformin due to GI distress.  Her creat is 0.96  with GFR 63 mL/min in Jan 2017.  She is to check her blood sugar premeals daily and I asked her to send me her blood sugar data in 1 week via [employer].   She was seen by [facility] in May 2016 without retinopathy.  Pt refuses the flu vaccine today.  I placed an order for labs to be done today.    2.  HTN: She has not taken her BP meds yet today.  Continue current RXs.    3.  CARDIOMYOPATHY/A FIB:  Pt followed here by Cardiology staff.      4. GRAVE'S DISEASE:  Hx of Grave's disease s/p I 131 in Jan 2013.    Pt became hypothyroid following above treatment and is taking Levothyroxine 200 mcg po daily.  Most recent TSH was normal in Oct 2015.  Check TSH/FT4 today.    5.  OBESITY:  Morbid obesity with BMI > 70 kg/m2.  She [facility].    6.  Return to Endocrine Clinic in 3 months.  I asked Pricilla to send me blood sugar readings via [employer] weekly so I can continue to make insulin adjustments if needed.    Pt is to check her blood sugar 5 times day. She is on insulin and has large blood sugar swings.  Isela Archibald PA-C     Again, thank you for allowing me to participate in the care of your patient.      Sincerely,    Isela Archibald PA-C

## 2017-02-13 ENCOUNTER — ALLIED HEALTH/NURSE VISIT (OUTPATIENT)
Dept: SURGERY | Facility: CLINIC | Age: 42
End: 2017-02-13

## 2017-02-13 ENCOUNTER — ANTICOAGULATION THERAPY VISIT (OUTPATIENT)
Dept: ANTICOAGULATION | Facility: CLINIC | Age: 42
End: 2017-02-13

## 2017-02-13 VITALS — WEIGHT: 293 LBS | BODY MASS INDEX: 72.33 KG/M2

## 2017-02-13 DIAGNOSIS — I48.91 ATRIAL FIBRILLATION, UNSPECIFIED TYPE (H): ICD-10-CM

## 2017-02-13 DIAGNOSIS — Z79.01 LONG-TERM (CURRENT) USE OF ANTICOAGULANTS: ICD-10-CM

## 2017-02-13 DIAGNOSIS — Z79.4 TYPE 2 DIABETES MELLITUS WITH HYPERGLYCEMIA, WITH LONG-TERM CURRENT USE OF INSULIN (H): ICD-10-CM

## 2017-02-13 DIAGNOSIS — E11.65 TYPE 2 DIABETES MELLITUS WITH HYPERGLYCEMIA, WITH LONG-TERM CURRENT USE OF INSULIN (H): ICD-10-CM

## 2017-02-13 LAB
ALT SERPL W P-5'-P-CCNC: 21 U/L (ref 0–50)
AST SERPL W P-5'-P-CCNC: 9 U/L (ref 0–45)
CREAT UR-MCNC: 68 MG/DL
INR PPP: 2.65 (ref 0.86–1.14)
MICROALBUMIN UR-MCNC: <5 MG/L
MICROALBUMIN/CREAT UR: NORMAL MG/G CR (ref 0–25)
T4 FREE SERPL-MCNC: 1.3 NG/DL (ref 0.76–1.46)
TSH SERPL DL<=0.05 MIU/L-ACNC: 0.75 MU/L (ref 0.4–4)

## 2017-02-13 NOTE — PROGRESS NOTES
ANTICOAGULATION FOLLOW-UP CLINIC VISIT    Patient Name:  Pricilla Brown  Date:  2/13/2017  Contact Type:  Telephone    SUBJECTIVE:     Patient Findings     Positives No Problem Findings           OBJECTIVE    INR   Date Value Ref Range Status   02/13/2017 2.65 (H) 0.86 - 1.14 Final     Chromogenic Factor 10   Date Value Ref Range Status   10/18/2016 20 (L) 70 - 130 % Final     Comment:     Therapeutic Range:  A Chromogenic Factor 10 level of approximately 20-40%   inversely correlates with an INR of 2-3 for patients receiving Warfarin.   Chromogenic Factor 10 levels below 20% indicate an INR greater than 3 and   levels above 40% indicate an INR less than 2.         ASSESSMENT / PLAN  INR assessment THER    Recheck INR In: 10 DAYS    INR Location Clinic      Anticoagulation Summary as of 2/13/2017     INR goal 2.0-2.5   Today's INR 2.65!   Maintenance plan 20 mg (5 mg x 4) on Mon, Wed, Fri; 15 mg (5 mg x 3) all other days   Full instructions 20 mg on Mon, Wed, Fri; 15 mg all other days   Weekly total 120 mg   No change documented Velvet Khan RN   Plan last modified Dorene Edge RN (1/13/2017)   Next INR check 2/22/2017   Priority INR   Target end date Indefinite    Indications   Atrial fibrillation (H) [I48.91] [I48.91]  Long-term (current) use of anticoagulants [Z79.01] [Z79.01]         Anticoagulation Episode Summary     INR check location Clinic Lab    Preferred lab     Send INR reminders to [facility] CL[facility]    Comments Contact Patient at [phone]      Anticoagulation Care Providers     Provider Role Specialty Phone number    Percy Alcala MD  Cardiology [phone]            See the Encounter Report to view Anticoagulation Flowsheet and Dosing Calendar (Go to Encounters tab in chart review, and find the Anticoagulation Therapy Visit)    Spoke with Pricilla.  She had other lab work done today,so she did an INR.  She will continue same warfarin dosing and recheck INR 2/22/17.  She  will eat some greens today to bring INR down slightly.    Velvet Khan RN

## 2017-02-13 NOTE — PATIENT INSTRUCTIONS
GOALS:  Relating To Eating:  Continue to Avoid Sugar and Limit Carbs:  -Track food intake prior to eating on MyFitnessPal.com with a target of no more than 1,800 Calories for 2 lbs/week weight loss, 2400 mg sodium or less/day would be great.    - Once you are re-evaluated by medical weight management and your diabetes management team, start a modified liquid diet (900 Jagjit/day):   Follow the Modified Liquid Diet for weight loss:  Breakfast: Protein Shake  Lunch: Protein Shake  Supper: 3 oz lean protein + non-starchy vegetables  Snack: non-starchy vegetables (no calorie-containing dips/condiments)  Beverages: at least 48-64 oz water between meals daily    *Protein Shake Criteria: ~200 Calories, at least 20 grams of protein, and less than 10 grams of sugar     -Eat slowly (20-30 minutes per meal), chewing foods well (25 chews per bite/applesauce consistency)    **-Reduce calorie containing beverages(regular pop) by half**    -Increase walking as able.  Walk around the building twice weekly, increasing as able.    Schedule appointment with Paulette.  If you need to reschedule please call [phone].  Velvet Mcallister RD, LD

## 2017-02-13 NOTE — PROGRESS NOTES
"  Bariatric Nutrition Consultation Note    Reason For Visit: Nutrition Reassessment    Pricilla Brown is a 41 year-old (type 2 DM on insulin sliding scale) presenting today for bariatric nutrition consult.  Pt is interested in laparoscopic sleeve gastrectomy.  This is pt's sixth of 6 RD visits required.  Pt was referred by NANETTE Kerr.    ANTHROPOMETRICS:  Ht: 67\"  Initial Wt: 462.6 lbs with BMI of 72.6  Current Wt: 461.8 lbs (-0.4 lbs over the past month; -0.4 lbs from initial weight)     Required weight loss goal pre-op: -46 lbs from initial consult weight (goal weight 416.6 lbs or less before surgery)    NUTRITION HISTORY:  Food Allergies/Intolerances: none known  Cravings: sweets, chips, pop (diet)  Triggers/cues causing extra eating: boredom (currently on disability, not working)    Progress with Previous Goals:  Relating To Eating:  Continue to Avoid Sugar and Limit Carbs:  -Track food intake prior to eating on MyFitnessPal.com with a target of no more than 1,800 Calories for 2 lbs/week weight loss, 2400 mg sodium or less/day would be great. -Still staying away from sugar.  A1c down to 8.8%.  Not using myfitness pal but reported that she is paying more attention to food labels.    - Once you are re-evaluated by medical weight management and your diabetes management team, start a modified liquid diet (900 Jagjit/day):   Follow the Modified Liquid Diet for weight loss:  Breakfast: Protein Shake  Lunch: Protein Shake  Supper: 3 oz lean protein + non-starchy vegetables  Snack: non-starchy vegetables (no calorie-containing dips/condiments)  Beverages: at least 48-64 oz water between meals daily    *Protein Shake Criteria: ~200 Calories, at least 20 grams of protein, and less than 10 grams of sugar   -using high pro slim fast and preparing two meals. Eating veggies between meals.  Meals are chicken, rice(smaller portions maybe 3/4 cup) vegetable and if still hungry fruit or vegetable.    **Pt reported struggling " with drinks- diet pop, drinking regular pop(1 liter per day, maybe more)    -Eat slowly (20-30 minutes per meal), chewing foods well (25 chews per bite/applesauce consistency) -doing better,     -Increase walking as able.  Walk around the building twice weekly, increasing as able. -walking more now.  Walked through the grocery store the other day.  Trying to walk at least 3 days per good.    *Pt's boyfriend stated that Pricilla (who he calls Jia) awakes every 2 hours at night to eat as of late.  However, when on topiramate, this did not occur as much.     NUTRITION DIAGNOSIS:  Obesity r/t long history of self-monitoring deficit and excessive energy intake aeb BMI >30.- continues    INTERVENTION:  Intervention Provided/Education Provided: Pt reported the modified liquid diet is going well, she is doing one shake per day and two meals.  Patient reported that she starting drinking regular pop again, encouraged cutting back on this to help with weight loss.  Praised patient on positive changes.  Gave encouragement and support.  Provided pt with list of goals.      Patient Understanding: good  Expected Compliance: good      GOALS:  Relating To Eating:  Continue to Avoid Sugar and Limit Carbs:  -Track food intake prior to eating on MyFitnessPal.com with a target of no more than 1,800 Calories for 2 lbs/week weight loss, 2400 mg sodium or less/day would be great.    - Once you are re-evaluated by medical weight management and your diabetes management team, start a modified liquid diet (900 Jagjit/day):   Follow the Modified Liquid Diet for weight loss:  Breakfast: Protein Shake  Lunch: Protein Shake  Supper: 3 oz lean protein + non-starchy vegetables  Snack: non-starchy vegetables (no calorie-containing dips/condiments)  Beverages: at least 48-64 oz water between meals daily    *Protein Shake Criteria: ~200 Calories, at least 20 grams of protein, and less than 10 grams of sugar     -Eat slowly (20-30 minutes per meal),  chewing foods well (25 chews per bite/applesauce consistency)    **-Reduce calorie containing beverages(regular pop) by half**    -Increase walking as able.  Walk around the building twice weekly, increasing as able.    Follow-Up: medical weight management as soon as possible (NANETTE Kerr), weight check in 2 weeks, follow-up with RD in 1 month.     Time spent with patient: 30 minutes.  Velvet Mcallister RD, LD

## 2017-02-13 NOTE — MR AVS SNAPSHOT
MRN:[id number]                      After Visit Summary   2/13/2017    Pricilla Brown    MRN: [id number]           Visit Information        Provider Department      2/13/2017 11:00 AM Pauletet Ruiz RD M [facility] Surgical Weight Management        Your next 10 appointments already scheduled     Feb 13, 2017 12:45 PM CST   LAB with  LAB    Health Lab ([facility])    [address] [id number] [phone]           Patient must bring picture ID.  Patient should be prepared to give a urine specimen  Please do not eat 10-12 hours before your appointment if you are coming in fasting for labs on lipids, cholesterol, or glucose (sugar).  Pregnant women should follow their Care Team instructions. Water with medications is okay. Do not drink coffee or other fluids.   If you have concerns about taking  your medications, please ask at office or if scheduling via [employer]art, send a message by clicking on Secure Messaging, Message Your Care Team.            Feb 15, 2017 11:00 AM CST   MR CARDIAC W CONTRAST STRESS AND FLOW with UUMR4, UU CV MR NURSE   [facility], [place], MRI ([facility] [place])    [address] [facility] [id number] [phone]           Take your medicines as usual, unless your doctor tells you not to.   If you take Viagra, Levitra or Cialis, stop taking it 48 hours before your test.   If you take Aggrenox or dipyridamole (Persantine, Permole), stop taking it 48 hours before your test.   If you take Theophylline or Aminophylline, stop taking it 12 hours before your test.   If you are diabetic and take oral hypoglycemics, do not take them on the day of your test.  The day before your exam, drink extra fluids at least six 8-ounce glasses (unless your doctor wants you to limit your fluids).  Stop all caffeine 12 hours before the test. This includes coffee, tea, soda, chocolate  and certain medicines (such as Anacin, Excedrin and NoDoz). Also avoid decaf coffee and tea, as these contain small amounts of caffeine.  Do not eat or drink for 3 hours before your exam. You may drink water and take your morning medicines.  You may need a blood test (creatinine test) within 30 days of your exam. Follow your doctor s orders if this is arranged before your exam.  If you are very claustrophobic, please let you doctor know. You may get a sedative medicine from your doctor before you arrive. Bring the medicine to the exam. Do not take it at home. Arrive one hour early. Bring someone who can take you home after the test. Your medicine will make you sleepy. After the exam, you may not drive, take a bus or take a taxi by yourself.  The MRI machine uses a strong magnet. Please wear clothes without metal (snaps, zippers). A sweatsuit works well, or we may give you a hospital gown.  Please remove any body piercings and hair extensions before you arrive. You will remove watches, jewelry, hairpins, wallets, dentures, partial dental plates and hearing aids. You may wear contact lenses, and you may be able to wear your rings. We have a safe place to keep your personal items, but it is safer to leave them at home.   **IMPORTANT** THE INSTRUCTIONS BELOW ARE ONLY FOR THOSE PATIENTS WHO HAVE BEEN TOLD THEY WILL RECEIVE SEDATION OR GENERAL ANESTHESIA DURING THEIR MRI PROCEDURE:  IF YOU WILL RECEIVE SEDATION (take medicine to help you relax during your exam):   You must get the medicine from your doctor before you arrive. Bring the medicine to the exam. Do not take it at home.   Arrive one hour early. Bring someone who can take you home after the test. Your medicine will make you sleepy. After the exam, you may not drive, take a bus or take a taxi by yourself.   No eating 8 hours before your exam. You may have clear liquids up until 4 hours before your exam. (Clear liquids include water, clear tea, black coffee and  fruit juice without pulp.)  IF YOU WILL RECEIVE ANESTHESIA (be asleep for your exam):   Arrive 1 1/2 hours early. Bring someone who can take you home after the test. You may not drive, take a bus or take a taxi by yourself.   No eating 8 hours before your exam. You may have clear liquids up until 4 hours before your exam. (Clear liquids include water, clear tea, black coffee and fruit juice without pulp.)  If you have any questions, please contact your Imaging Department exam site.            Feb 22, 2017  3:30 PM CST   (Arrive by 3:15 PM)   RETURN HEART FAILURE with Percy Alcala MD   [facility] Heart Care ([facility])    [address] [place] [address]  3rd [facility] [address] [phone]            Feb 27, 2017 10:30 AM CST   Nurse Visit with  Surgery Nurse   General Surgery ([facility] [place])    [address] [place] [address] [phone]            Feb 28, 2017 10:00 AM CST   Return Sleep Patient with YOCASTA Horan [facility], [place], Sleep Study ([facility])    [address] [facility] [id number] [phone]            Mar 10, 2017 11:00 AM CST   (Arrive by 10:45 AM)   Return Weight Management Visit with Steph Kim PA-C   [facility] Medical Weight Management ([facility])    [address] [place] [address]   [phone]            Mar 13, 2017 11:30 AM CDT   NUTRITION VISIT with Paulette Ruiz RD   [facility] Surgical Weight Management ([facility] [place])    [address] [place] [address] [phone]            May 04, 2017 11:00 AM CDT   [facility]    [address] [facility] [address]   [phone]            May 09, 2017 11:15 AM  CDT   (Arrive by 11:00 AM)   Return Visit with Silva Damon MD   [facility] Medical Weight Management ([facility] [place])    [address] [facility]  4th [facility] [address] [phone]              Care Instructions    GOALS:  Relating To Eating:  Continue to Avoid Sugar and Limit Carbs:  -Track food intake prior to eating on MyFitnessPal.com with a target of no more than 1,800 Calories for 2 lbs/week weight loss, 2400 mg sodium or less/day would be great.    - Once you are re-evaluated by medical weight management and your diabetes management team, start a modified liquid diet (900 Jagjit/day):   Follow the Modified Liquid Diet for weight loss:  Breakfast: Protein Shake  Lunch: Protein Shake  Supper: 3 oz lean protein + non-starchy vegetables  Snack: non-starchy vegetables (no calorie-containing dips/condiments)  Beverages: at least 48-64 oz water between meals daily    *Protein Shake Criteria: ~200 Calories, at least 20 grams of protein, and less than 10 grams of sugar     -Eat slowly (20-30 minutes per meal), chewing foods well (25 chews per bite/applesauce consistency)    **-Reduce calorie containing beverages(regular pop) by half**    -Increase walking as able.  Walk around the building twice weekly, increasing as able.    Schedule appointment with Paulette.  If you need to reschedule please call [phone].  Velvet Mcallister RD, LD         Displair Information     Dis[employer]air gives you secure access to your electronic health record. If you see a primary care provider, you can also send messages to your care team and make appointments. If you have questions, please call your primary care clinic.  If you do not have a primary care provider, please call [phone] and they will assist you.      Dis[employer] is an electronic gateway that provides easy, online access to your medical records. With [employer], you can request a clinic appointment, read your test results, renew a prescription  or communicate with your care team.     To access your existing account, please contact your [facility] or call [phone] for assistance.        Care EveryWhere ID     This is your Care EveryWhere ID. This could be used by other organizations to access your [place] medical records  [id number]

## 2017-02-13 NOTE — MR AVS SNAPSHOT
Pricilla Brown   2/13/2017   Anticoagulation Therapy Visit    Description:  42 year old female   Provider:  Velvet Khan, RN   Department:  [facility]           INR as of 2/13/2017     Today's INR 2.65!      Anticoagulation Summary as of 2/13/2017     INR goal 2.0-2.5   Today's INR 2.65!   Full instructions 20 mg on Mon, Wed, Fri; 15 mg all other days   Next INR check 2/22/2017    Indications   Atrial fibrillation (H) [I48.91] [I48.91]  Long-term (current) use of anticoagulants [Z79.01] [Z79.01]         February 2017 Details    Sun Mon Tue Wed Thu Fri Sat        1               2               3               4                 5               6               7               8               9               10               11                 12               13      20 mg   See details      14      15 mg         15      20 mg         16      15 mg         17      20 mg         18      15 mg           19      15 mg         20      20 mg         21      15 mg         22            23               24               25                 26               27               28                    Date Details   02/13 This INR check       Date of next INR:  2/22/2017         How to take your warfarin dose     To take:  15 mg Take 3 of the 5 mg tablets.    To take:  20 mg Take 4 of the 5 mg tablets.

## 2017-02-14 ENCOUNTER — PRE VISIT (OUTPATIENT)
Dept: CARDIOLOGY | Facility: CLINIC | Age: 42
End: 2017-02-14

## 2017-02-16 ENCOUNTER — MEDICAL CORRESPONDENCE (OUTPATIENT)
Dept: HEALTH INFORMATION MANAGEMENT | Facility: CLINIC | Age: 42
End: 2017-02-16

## 2017-02-17 ENCOUNTER — CARE COORDINATION (OUTPATIENT)
Dept: CARDIOLOGY | Facility: CLINIC | Age: 42
End: 2017-02-17

## 2017-02-17 DIAGNOSIS — F40.240 CLAUSTROPHOBIA: Primary | ICD-10-CM

## 2017-02-17 RX ORDER — DIAZEPAM 5 MG
TABLET ORAL
Qty: 1 TABLET | Refills: 0 | COMMUNITY
Start: 2017-02-17 | End: 2018-02-07

## 2017-02-17 NOTE — PROGRESS NOTES
Received task from scheduling dept that pt had called to report she is claustrophobic and is requesting something to take prior to her cardiac MRI scheduled for next Tuesday 2/21.  I spoke with patient and we reviewed that she must have a  come with her if she is premedicated for the MRI.  She understands.  Per protocol for claustrophobia/MRI, we will order a one time dose of diazepam 5 mg.  Script called into the [facility] pharmacy in hospital on 3rd floor.  Pt will pick it up about an hour prior to MRI and hand carry it to the check in area in the Gold Waiting room.

## 2017-02-20 ENCOUNTER — MEDICAL CORRESPONDENCE (OUTPATIENT)
Dept: HEALTH INFORMATION MANAGEMENT | Facility: CLINIC | Age: 42
End: 2017-02-20

## 2017-03-09 DIAGNOSIS — Z79.4 TYPE 2 DIABETES MELLITUS WITH HYPERGLYCEMIA, WITH LONG-TERM CURRENT USE OF INSULIN (H): Primary | ICD-10-CM

## 2017-03-09 DIAGNOSIS — E11.65 TYPE 2 DIABETES MELLITUS WITH HYPERGLYCEMIA, WITH LONG-TERM CURRENT USE OF INSULIN (H): Primary | ICD-10-CM

## 2017-03-09 RX ORDER — LANCETS
EACH MISCELLANEOUS
Qty: 4 BOX | Refills: 2 | Status: SHIPPED | OUTPATIENT
Start: 2017-03-09 | End: 2017-10-11

## 2017-03-09 NOTE — TELEPHONE ENCOUNTER
"Per medicare guidelines, please send new prescription for BG teststrips and lancets to pharmacy. Patient is considered \"high utilization\" because she is testing 5 times a day and they will only allow the script to be good for 6 months at a time.    Any question please call pharmacy at [phone]    Thank you,    Lalito Abraham  [facility]  [phone]  "

## 2017-03-11 DIAGNOSIS — E87.6 HYPOKALEMIA: Primary | ICD-10-CM

## 2017-03-13 ENCOUNTER — ALLIED HEALTH/NURSE VISIT (OUTPATIENT)
Dept: SURGERY | Facility: CLINIC | Age: 42
End: 2017-03-13

## 2017-03-13 RX ORDER — POTASSIUM CHLORIDE 1500 MG/1
40 TABLET, EXTENDED RELEASE ORAL 4 TIMES DAILY
Qty: 240 TABLET | Refills: 3 | Status: SHIPPED | OUTPATIENT
Start: 2017-03-13 | End: 2017-07-21

## 2017-03-13 NOTE — MR AVS SNAPSHOT
MRN:[id number]                      After Visit Summary   3/13/2017    Pricilla Brown    MRN: [id number]           Visit Information        Provider Department      3/13/2017 11:30 AM Paulette Ruiz RD M [facility] Surgical Weight Management        Your next 10 appointments already scheduled     Mar 14, 2017  1:00 PM CDT   Return Sleep Patient with YOCASTA Horan [facility], [place], Sleep Study ([facility])    [address] [facility] [address] [phone]            Mar 15, 2017  9:00 AM CDT   (Arrive by 8:45 AM)   RETURN HEART FAILURE with Percy Alcala MD   [facility] ([facility])    [address] [facility]  3rd [facility] [id number] [phone]            May 04, 2017 11:00 AM CDT   Return Visit with Norman Jean DPM   [facility] ([facility])    [address] [facility] [address] [phone]            May 09, 2017 11:15 AM CDT   (Arrive by 11:00 AM)   Return Visit with Silva Damon MD   [facility] Medical Weight Management ([facility])    [address] [facility]  4th Floor  [facility] [id number] [phone]              Care Instructions    GOALS:  Relating To Eating:  Continue to Avoid Sugar and Limit Carbs:  -Track food intake prior to eating on MyFitnessPal.com with a target of no more than 1,800 Calories for 2 lbs/week weight loss, 2400 mg sodium or less/day would be great.    - Once you are re-evaluated by medical weight management and your diabetes management team, start a modified liquid diet (900 Jagjit/day):   Follow the Modified Liquid Diet for weight loss:  Breakfast: Protein Shake  Lunch: Protein Shake  Supper: 3 oz lean protein + non-starchy vegetables  Snack: non-starchy vegetables (no calorie-containing dips/condiments)  Beverages: at least 48-64 oz water  between meals daily    *Protein Shake Criteria: ~200 Calories, at least 20 grams of protein, and less than 10 grams of sugar (Premier Protein would work with 30 g protein and 160 Jagjit)    -Eat slowly (20-30 minutes per meal), chewing foods well (25 chews per bite/applesauce consistency)    -Continue to avoid calorie containing beverages while increasing water and decreasing carbonation (diet pop).    -Increase walking as able.  Walk around the building twice weekly, increasing as able.    Follow-Up: medical weight management as soon as possible (NANETTE Kerr), weight check in 2 weeks, follow-up with RD in 1 month.        [employer] Information     [employer] gives you secure access to your electronic health record. If you see a primary care provider, you can also send messages to your care team and make appointments. If you have questions, please call your primary care clinic.  If you do not have a primary care provider, please call [phone] and they will assist you.      [employer] is an electronic gateway that provides easy, online access to your medical records. With [employer], you can request a clinic appointment, read your test results, renew a prescription or communicate with your care team.     To access your existing account, please contact your [facility] or call [phone] for assistance.        Care EveryWhere ID     This is your Care EveryWhere ID. This could be used by other organizations to access your [place] medical records  [id number]

## 2017-03-13 NOTE — PATIENT INSTRUCTIONS
GOALS:  Relating To Eating:  Continue to Avoid Sugar and Limit Carbs:  -Track food intake prior to eating on MyFitnessPal.com with a target of no more than 1,800 Calories for 2 lbs/week weight loss, 2400 mg sodium or less/day would be great.    - Once you are re-evaluated by medical weight management and your diabetes management team, start a modified liquid diet (900 Jagjit/day):   Follow the Modified Liquid Diet for weight loss:  Breakfast: Protein Shake  Lunch: Protein Shake  Supper: 3 oz lean protein + non-starchy vegetables  Snack: non-starchy vegetables (no calorie-containing dips/condiments)  Beverages: at least 48-64 oz water between meals daily    *Protein Shake Criteria: ~200 Calories, at least 20 grams of protein, and less than 10 grams of sugar (Premier Protein would work with 30 g protein and 160 Jagjit)    -Eat slowly (20-30 minutes per meal), chewing foods well (25 chews per bite/applesauce consistency)    -Continue to avoid calorie containing beverages while increasing water and decreasing carbonation (diet pop).    -Increase walking as able.  Walk around the building twice weekly, increasing as able.    Follow-Up: medical weight management as soon as possible (NANETTE Kerr), weight check in 2 weeks, follow-up with RD in 1 month.

## 2017-03-13 NOTE — PROGRESS NOTES
"  Bariatric Nutrition Consultation Note    Reason For Visit: Nutrition Reassessment    Pricilla Brown is a 42 year-old (type 2 DM on insulin sliding scale) presenting today for bariatric nutrition consult.  Pt is interested in laparoscopic sleeve gastrectomy.  Pt has met all required RD visits prior to surgery.  Pt was referred by NANETTE Kerr.    ANTHROPOMETRICS:  Ht: 67\"  Initial Wt: 462.6 lbs with BMI of 72.6  Current Wt: 461 lbs (-0.8 lbs over the past month; -1.6 lbs from initial weight)     Required weight loss goal pre-op: -46 lbs from initial consult weight (goal weight 416.6 lbs or less before surgery)    NUTRITION HISTORY:  Food Allergies/Intolerances: none known  Cravings: sweets, chips, pop (diet)  Triggers/cues causing extra eating: boredom (currently on disability, not working)    Progress with Previous Goals:  Relating To Eating:  Continue to Avoid Sugar and Limit Carbs:  -Track food intake prior to eating on [email or web address] with a target of no more than 1,800 Calories for 2 lbs/week weight loss, 2400 mg sodium or less/day would be great.- Not met. Pt reported that her appetite suppressant hasn't been working very well.  She plans to follow-up with the PA about this.     - Once you are re-evaluated by medical weight management and your diabetes management team, start a modified liquid diet (900 Jagjit/day): - Pt is having one shake for breakfast meal replacement and 2 meals:   Follow the Modified Liquid Diet for weight loss:  Breakfast: Protein Shake - Slim Fast protein shake  Lunch: Protein Shake - PB on chicken and rice is what she has for lunch  Supper: 3 oz lean protein + non-starchy vegetables- chicken noodle soup is what she has for supper.  Snack: non-starchy vegetables (no calorie-containing dips/condiments)  Beverages: at least 48-64 oz water between meals daily    *Protein Shake Criteria: ~200 Calories, at least 20 grams of protein, and less than 10 grams of sugar     -Eat slowly (20-30 " minutes per meal), chewing foods well (25 chews per bite/applesauce consistency)- Improving, sometimes forgets, but catches herself.     **-Reduce calorie containing beverages(regular pop) by half**- Pt no longer drinks sugar pop, but still has coffee with cream/sugar.  She drinks diet pop and plans to gradually go off the carbonation.    -Increase walking as able.  Walk around the building twice weekly, increasing as able.- Pt is walking daily at least 1-2 blocks in apartment building.  Pt hopes to start exercising in the pool.      NUTRITION DIAGNOSIS:  Obesity r/t long history of self-monitoring deficit and excessive energy intake aeb BMI >30.- continues    INTERVENTION:  Intervention Provided/Education Provided: Reviewed previous goals and how to improve weight loss progress.  Discussed portion control, volumetric-type eating for satiety on less calories.  Emphasized the importance of mindful eating.  Gave encouragement and support.  Provided pt with list of goals.      Patient Understanding: good  Expected Compliance: good      GOALS:  Relating To Eating:  Continue to Avoid Sugar and Limit Carbs:  -Track food intake prior to eating on [email or web address] with a target of no more than 1,800 Calories for 2 lbs/week weight loss, 2400 mg sodium or less/day would be great.    - Once you are re-evaluated by medical weight management and your diabetes management team, start a modified liquid diet (900 Jagjit/day):   Follow the Modified Liquid Diet for weight loss:  Breakfast: Protein Shake  Lunch: Protein Shake  Supper: 3 oz lean protein + non-starchy vegetables  Snack: non-starchy vegetables (no calorie-containing dips/condiments)  Beverages: at least 48-64 oz water between meals daily    *Protein Shake Criteria: ~200 Calories, at least 20 grams of protein, and less than 10 grams of sugar (Premier Protein would work with 30 g protein and 160 Jagjit)    -Eat slowly (20-30 minutes per meal), chewing foods well (25 chews per  bite/applesauce consistency)    -Continue to avoid calorie containing beverages while increasing water and decreasing carbonation (diet pop).    -Increase walking as able.  Walk around the building twice weekly, increasing as able.    Follow-Up: medical weight management as soon as possible (NANETTE Kerr), weight check in 2 weeks, follow-up with RD in 1 month.     Time spent with patient: 30 minutes.  Paulette Ruiz MS, RDN, LDN, CLT  Pager: [phone]

## 2017-03-14 ENCOUNTER — PRE VISIT (OUTPATIENT)
Dept: CARDIOLOGY | Facility: CLINIC | Age: 42
End: 2017-03-14

## 2017-03-14 ENCOUNTER — OFFICE VISIT (OUTPATIENT)
Dept: SLEEP MEDICINE | Facility: CLINIC | Age: 42
End: 2017-03-14
Attending: NURSE PRACTITIONER
Payer: MEDICARE

## 2017-03-14 VITALS
BODY MASS INDEX: 45.99 KG/M2 | OXYGEN SATURATION: 100 % | SYSTOLIC BLOOD PRESSURE: 125 MMHG | HEIGHT: 67 IN | RESPIRATION RATE: 20 BRPM | DIASTOLIC BLOOD PRESSURE: 70 MMHG | HEART RATE: 93 BPM | WEIGHT: 293 LBS

## 2017-03-14 DIAGNOSIS — G47.33 OSA (OBSTRUCTIVE SLEEP APNEA): Primary | ICD-10-CM

## 2017-03-14 DIAGNOSIS — I50.32 CHRONIC DIASTOLIC HEART FAILURE (H): ICD-10-CM

## 2017-03-14 DIAGNOSIS — Z79.01 CHRONIC ANTICOAGULATION: ICD-10-CM

## 2017-03-14 DIAGNOSIS — E66.01 MORBID OBESITY DUE TO EXCESS CALORIES (H): ICD-10-CM

## 2017-03-14 DIAGNOSIS — G47.8 UNHEALTHY SLEEP HABIT: ICD-10-CM

## 2017-03-14 DIAGNOSIS — I48.19 PERSISTENT ATRIAL FIBRILLATION (H): Primary | ICD-10-CM

## 2017-03-14 PROCEDURE — 99211 OFF/OP EST MAY X REQ PHY/QHP: CPT | Mod: ZF

## 2017-03-14 ASSESSMENT — ENCOUNTER SYMPTOMS
LIGHT-HEADEDNESS: 0
LEG PAIN: 0
SNORES LOUDLY: 1
EXERCISE INTOLERANCE: 1
RESPIRATORY PAIN: 0
PALPITATIONS: 1
LEG SWELLING: 1
HEMOPTYSIS: 0
WHEEZING: 0
COUGH: 1
DYSPNEA ON EXERTION: 1
CLAUDICATION: 0
COUGH DISTURBING SLEEP: 0
TACHYCARDIA: 1
HOT FLASHES: 0
ORTHOPNEA: 1
SLEEP DISTURBANCES DUE TO BREATHING: 0
POSTURAL DYSPNEA: 1
SYNCOPE: 0
SHORTNESS OF BREATH: 1
DECREASED LIBIDO: 0
SPUTUM PRODUCTION: 1
HYPOTENSION: 0
HYPERTENSION: 1

## 2017-03-14 NOTE — MR AVS SNAPSHOT
After Visit Summary   3/14/2017    Pricilla Brown    MRN: [id number]           Patient Information     Date Of Birth          1975        Visit Information        Provider Department      3/14/2017 1:00 PM Alecia Ramirez APRN [facility], [place], Sleep Study        Care Instructions      Until you are seen again in clinic in the next 12 months, please watch for a return of sleepiness which is usually due to a change in the followin) a decrease in usage of cpap  2) decrease in amount of time slept  3) a poor seal (often a function of not following cleaning recommendations or  replacement guidelines (3-6 months)  4)  increase in weight or use of sedating medications resulting in higher pressure needs     If you have tried to address these issues but are still sleepy, please call for an earlier appointment.    Please, do not drive if sleepy      With bathroom break, remove hose from headgear          Follow-ups after your visit        Follow-up notes from your care team     Return in about 1 year (around 3/14/2018).      Your next 10 appointments already scheduled     Mar 15, 2017  9:00 AM CDT   (Arrive by 8:45 AM)   RETURN HEART FAILURE with Percy Alcala MD   [facility] ([facility] [place])    [address] [place] [address]  3rd [facility] [address]   [phone]            Mar 28, 2017 11:30 AM CDT   Nurse Visit with  Surgery Nurse   General Surgery ([facility])    [address] [place] [address]  4th [facility] [address]   [phone]            2017  2:00 PM CDT   (Arrive by 1:45 PM)   Return Weight Management Visit with Steph Kim PA-C   [facility] ([facility])    [address] [place] [address]  4th [facility] [address]   [phone]            2017 11:30 AM CDT   NUTRITION VISIT with Paulette Ruiz RD   [facility]  Weight Management ([facility] [place])    [address] [facility]  4th [facility] [address]   [phone]            May 04, 2017 11:00 AM CDT   Return Visit with Norman Jean DPM   [facility] ([facility])    [address] [address] [place] [address]   [phone]            May 09, 2017 11:15 AM CDT   (Arrive by 11:00 AM)   Return Visit with Silva Damon MD   [facility] ([facility])    [address] [facility]  4th [facility] [address]   [phone]              Future tests that were ordered for you today     Open Future Orders        Priority Expected Expires Ordered    Basic metabolic panel Routine 3/15/2017 3/31/2017 3/14/2017    N terminal pro BNP outpatient Routine 3/15/2017 3/31/2017 3/14/2017    INR Routine 3/15/2017 3/31/2017 3/14/2017            Who to contact     If you have questions or need follow up information about today's clinic visit or your schedule please contact [facility], SLEEP STUDY directly at [phone].  Normal or non-critical lab and imaging results will be communicated to you by [employer], letter or phone within 4 business days after the clinic has received the results. If you do not hear from us within 7 days, please contact the clinic through [employer] or phone. If you have a critical or abnormal lab result, we will notify you by phone as soon as possible.  Submit refill requests through [employer] or call your pharmacy and they will forward the refill request to us. Please allow 3 business days for your refill to be completed.          Additional Information About Your Visit        [employer] Information     [employer] gives you secure access to your electronic health record. If you see a primary care provider, you can also send messages to your care team and make appointments. If you have questions, please call your primary care clinic.  If you do not  "have a primary care provider, please call [phone] and they will assist you.        Care EveryWhere ID     This is your Care EveryWhere ID. This could be used by other organizations to access your [facility] medical records  [id number]        Your Vitals Were     Pulse Respirations Height Pulse Oximetry BMI (Body Mass Index)       93 20 1.702 m (5' 7\") 100% 72.05 kg/m2        Blood Pressure from Last 3 Encounters:   03/14/17 125/70   02/07/17 (!) 149/91   02/06/17 133/74    Weight from Last 3 Encounters:   03/14/17 (!) 208.7 kg (460 lb)   02/13/17 (!) 209.5 kg (461 lb 12.8 oz)   02/07/17 (!) 211.9 kg (467 lb 1.6 oz)              Today, you had the following     No orders found for display       Primary Care Provider Office Phone # Fax #    Jamilah Bernal [phone]       [address]  [facility] [address]        Thank you!     Thank you for choosing [facility], SLEEP [facility]  for your care. Our goal is always to provide you with excellent care. Hearing back from our patients is one way we can continue to improve our services. Please take a few minutes to complete the written survey that you may receive in the mail after your visit with us. Thank you!             Your Updated Medication List - Protect others around you: Learn how to safely use, store and throw away your medicines at www.disposemymeds.org.          This list is accurate as of: 3/14/17  1:45 PM.  Always use your most recent med list.                   Brand Name Dispense Instructions for use    acetaminophen 325 MG tablet    TYLENOL    60 tablet    Take 2 tablets (650 mg) by mouth 3 times daily as needed for mild pain or fever (total acetaminophen dose should not exceed 3000 mg per day)       albuterol 108 (90 BASE) MCG/ACT Inhaler    PROAIR HFA/PROVENTIL HFA/VENTOLIN HFA    1 Inhaler    Inhale 2 puffs into the lungs every 6 hours as needed.       bisacodyl 10 MG Suppository    DULCOLAX    6 " suppository    Place 1 suppository (10 mg) rectally daily as needed for constipation       * blood glucose monitoring lancets     1 Box    Use to test blood sugars 4 times daily or as directed.       * blood glucose monitoring lancets     4 Box    Use to test blood sugar 4 times daily or as directed.       blood glucose monitoring meter device kit     1 kit    Use to test blood sugars 2 times daily or as directed.       * blood glucose monitoring test strip    ACCU-CHEK RON    100 strip    Use to test blood sugars 2 times daily or as directed.       * blood glucose monitoring test strip    ACCU-CHEK SMARTVIEW    450 each    Use to test blood sugar 5 times daily       buPROPion 100 MG 12 hr tablet    WELLBUTRIN SR    60 tablet    Take 1 tablet (100 mg) by mouth 2 times daily       capsaicin 0.025 % Crea cream    ZOSTRIX    1 Tube    Apply 1 g topically 3 times daily as needed       ciclopirox 8 % Soln     1 Bottle    Externally apply topically daily To toenails.       diazepam 5 MG tablet    VALIUM    1 tablet    Hand carry to procedure on 2/21. Do not take until instructed.       diclofenac 1 % Gel topical gel    VOLTAREN    100 g    Place 4 g onto the skin 4 times daily       DULoxetine 20 MG EC capsule    CYMBALTA    60 capsule    Take 1 capsule (20 mg) by mouth 2 times daily       Efinaconazole 10 % Soln     8 mL    Externally apply topically daily To toenails.       gabapentin 300 MG capsule    NEURONTIN    120 capsule    Take 2 capsules (600 mg) by mouth 2 times daily       hydrALAZINE 25 MG tablet    APRESOLINE    150 tablet    Take 1 tab (25 mg) in am, 2 tabs (50 mg) midday, and 2 tabs (50 mg) in pm daily       insulin glargine U-300 300 UNIT/ML injection    TOUJEO    15 mL    Inject 160 units SQ each am.       * insulin pen needle 30G X 8 MM    NOVOFINE    100 each    Use 2X daily or as directed.       * insulin pen needle 32G X 4 MM    BD RIVER U/F    200 each    Use 6 daily or as directed.       insulin  "syringe-needle U-100 30G X 1/2\" 0.5 ML    BD insulin syringe ULTRAFINE    100 each    Use one syringe 3 daily or as directed.       levothyroxine 200 MCG tablet    SYNTHROID/LEVOTHROID    90 tablet    Take 1 tablet (200 mcg) by mouth daily       liraglutide 18 MG/3ML soln    VICTOZA PEN    27 mL    Inject 1.8 mg Subcutaneous daily       metoprolol 50 MG 24 hr tablet    TOPROL-XL    90 tablet    Take 0.5 tablets (25 mg) by mouth At Bedtime       morphine 15 MG 12 hr tablet    MS CONTIN     Take 15 mg by mouth daily as needed       Nebulizer Compressor Kit     1 kit    1 Device 4 times daily as needed.       nicotine 14 MG/24HR 24 hr patch    [facility] NICOTINE    30 patch    Place 1 patch onto the skin every 24 hours       NovoLOG FLEXPEN 100 UNIT/ML injection   Generic drug:  insulin aspart     60 mL    Inject 60 units with meals plus correction. Pt uses approx 180 units in 24 hrs.       nystatin cream    MYCOSTATIN    90 g    Apply topically 2 times daily To toenails       order for DME     1 each    Use your CPAP device as directed by your provider. Pressure change to min 13 max 18cwp       * order for DME     1 each    Equipment being ordered: Challenger Wide walker if available - patient needs seat, basket and brakes.       * order for DME     1 each    Equipment being ordered:  [id number] $92  Plantar, fasciitis, LG, night splint       * order for DME     1 Units    Left foot       oxyCODONE 10 MG IR tablet    ROXICODONE    12 tablet    Take 1 tablet (10 mg) by mouth every 6 hours as needed for moderate to severe pain       oxyCODONE-acetaminophen 5-325 MG per tablet    PERCOCET    60 tablet    Take 1 tablet by mouth every 8 hours as needed for moderate to severe pain (try to limit use, no further prescriptions until seen in pain clinic)       polyethylene glycol powder    MIRALAX/GLYCOLAX         potassium chloride SA 20 MEQ CR tablet    potassium chloride    240 tablet    Take 2 tablets (40 mEq) by mouth 4 " times daily       senna 8.6 MG tablet    SENOKOT    45 tablet    Take 1 tablet by mouth 2 times daily       spironolactone 50 MG tablet    ALDACTONE    30 tablet    Take 1 tablet (50 mg) by mouth daily       tiotropium 18 MCG capsule    SPIRIVA HANDIHALER    30 capsule    Inhale contents of one capsule daily.       topiramate 25 MG tablet    TOPAMAX    180 tablet    Take 3 tablets (75 mg) by mouth 2 times daily       torsemide 100 MG tablet    DEMADEX    180 tablet    Take 1 tablet (100 mg) by mouth 2 times daily       * warfarin 7.5 MG tablet    COUMADIN    30 tablet    Take 15 mg PO daily on 10/20, 10/21, 10/22. Check INR on 10/23. Further dosing per INR level.       * JANTOVEN 10 MG tablet   Generic drug:  warfarin      Take 10 mg by mouth daily as needed       * Notice:  This list has 11 medication(s) that are the same as other medications prescribed for you. Read the directions carefully, and ask your doctor or other care provider to review them with you.

## 2017-03-14 NOTE — PROGRESS NOTES
Percy Alcala M.D.  Cardiovascular Medicine    I personally saw and examined this patient, discussed care with housestaff and other consultants, reviewed current laboratories and imaging studies, and conveyed impression and diagnostic/therapeutic plan to patient.    Problem List  Atrial fibrillation    SOB (shortness of breath  Chronic diastolic heart failure    Dilated cardiomyopathy    Chronic atrial fibrillation    Cellulitis  Long-term  use of anticoagulants    Plantar fasciitis  Neuropathic pain of lower extremity  Peritonsillar abscess  Asthma  Azotemia  Hypothyroidism following radioiodine therapy  JYOTI (obstructive sleep apnea) CPAP Min Pressure of 13 and Max Pressure of 18  Chronic anticoagulation for a-fib  Morbid obesity    Diabetes mellitus, type 2  History    Patient returns with palpitations without chest pain, pre-syncope, syncope, PND, orthopnea, weight change, stimulant usage, thromboembolic or bleeding complications.  Previous atrial fibrillation and chronic warfarin anti-coagulation.  .    Objective    Constitutional: alert, oriented, normal gait and station, normal mentation.  Oral: moist mucous membrans  Lymph: without pathologic adenopathy  Chest: clear to ausculation and percussion  Cor: No evidence of left or right ventricular activity.  Rhythm is regular.  S1 normal, S2 split physiologically. Murmurs are not present  Abdomen: without tenderness, rebound, guarding, masses, ascites  Extremities: Edema not present  Neuro: no focal defects, normal mentation  Skin: without open lesions  Psych: oriented, verbal, mental status in tact        Wt Readings from Last 5 Encounters:   03/14/17 (!) 208.7 kg (460 lb)   02/13/17 (!) 209.5 kg (461 lb 12.8 oz)   02/07/17 (!) 211.9 kg (467 lb 1.6 oz)   02/06/17 (!) 211.4 kg (466 lb 0.8 oz)   02/03/17 (!) 211.4 kg (466 lb)       Meds    Current Outpatient Prescriptions on File Prior to Visit:  potassium chloride SA (POTASSIUM CHLORIDE) 20 MEQ CR tablet Take 2  tablets (40 mEq) by mouth 4 times daily   blood glucose monitoring (ACCU-CHEK FASTCLIX) lancets Use to test blood sugar 4 times daily or as directed.   blood glucose monitoring (ACCU-CHEK SMARTVIEW) test strip Use to test blood sugar 5 times daily   diazepam (VALIUM) 5 MG tablet Hand carry to procedure on 2/21. Do not take until instructed.   torsemide (DEMADEX) 100 MG tablet Take 1 tablet (100 mg) by mouth 2 times daily   insulin glargine U-300 (TOUJEO) 300 UNIT/ML injection Inject 160 units SQ each am.   NOVOLOG FLEXPEN 100 UNIT/ML soln Inject 60 units with meals plus correction. Pt uses approx 180 units in 24 hrs.   polyethylene glycol (MIRALAX/GLYCOLAX) powder    topiramate (TOPAMAX) 25 MG tablet Take 3 tablets (75 mg) by mouth 2 times daily   acetaminophen (TYLENOL) 325 MG tablet Take 2 tablets (650 mg) by mouth 3 times daily as needed for mild pain or fever (total acetaminophen dose should not exceed 3000 mg per day)   levothyroxine (SYNTHROID/LEVOTHROID) 200 MCG tablet Take 1 tablet (200 mcg) by mouth daily   morphine (MS CONTIN) 15 MG 12 hr tablet Take 15 mg by mouth daily as needed   JANTOVEN 10 MG tablet Take 10 mg by mouth daily as needed   buPROPion (WELLBUTRIN SR) 100 MG 12 hr tablet Take 1 tablet (100 mg) by mouth 2 times daily   hydrALAZINE (APRESOLINE) 25 MG tablet Take 1 tab (25 mg) in am, 2 tabs (50 mg) midday, and 2 tabs (50 mg) in pm daily   order for DME Left foot   liraglutide (VICTOZA PEN) 18 MG/3ML soln Inject 1.8 mg Subcutaneous daily   diclofenac (VOLTAREN) 1 % GEL Place 4 g onto the skin 4 times daily   oxyCODONE (ROXICODONE) 10 MG immediate release tablet Take 1 tablet (10 mg) by mouth every 6 hours as needed for moderate to severe pain   warfarin (COUMADIN) 7.5 MG tablet Take 15 mg PO daily on 10/20, 10/21, 10/22. Check INR on 10/23. Further dosing per INR level.   metoprolol (TOPROL-XL) 50 MG 24 hr tablet Take 0.5 tablets (25 mg) by mouth At Bedtime   order for DME Equipment being  "ordered:  [id number] $92 [id number], fasciitis, LG, night splint   capsaicin (ZOSTRIX) 0.025 % CREA Apply 1 g topically 3 times daily as needed   order for DME Equipment being ordered: Challenger Wide walker if available - patient needs seat, basket and brakes.   nicotine ([employer] NICOTINE) 14 MG/24HR patch 2h hr Place 1 patch onto the skin every 24 hours   insulin pen needle (BD RIVER U/F) 32G X 4 MM Use 6 daily or as directed.   ciclopirox 8 % SOLN Externally apply topically daily To toenails.   nystatin (MYCOSTATIN) cream Apply topically 2 times daily To toenails   Efinaconazole 10 % SOLN Externally apply topically daily To toenails.   insulin pen needle (NOVOFINE) 30G X 8 MM Use 2X daily or as directed.   gabapentin (NEURONTIN) 300 MG capsule Take 2 capsules (600 mg) by mouth 2 times daily   spironolactone (ALDACTONE) 50 MG tablet Take 1 tablet (50 mg) by mouth daily   insulin syringe-needle U-100 (BD INSULIN SYRINGE ULTRAFINE) 30G X 1/2\" 0.5 ML Use one syringe 3 daily or as directed.   blood glucose monitoring (ONE TOUCH DELICA) lancets Use to test blood sugars 4 times daily or as directed.   oxyCODONE-acetaminophen (PERCOCET) 5-325 MG per tablet Take 1 tablet by mouth every 8 hours as needed for moderate to severe pain (try to limit use, no further prescriptions until seen in pain clinic)   blood glucose (ACCU-CHEK RON) test strip Use to test blood sugars 2 times daily or as directed.   DULoxetine (CYMBALTA) 20 MG capsule Take 1 capsule (20 mg) by mouth 2 times daily   senna (SENOKOT) 8.6 MG tablet Take 1 tablet by mouth 2 times daily   bisacodyl (DULCOLAX) 10 MG suppository Place 1 suppository (10 mg) rectally daily as needed for constipation   Blood Glucose Monitoring Suppl (ACCU-CHEK RON PLUS) W/DEVICE KIT Use to test blood sugars 2 times daily or as directed.   tiotropium (SPIRIVA HANDIHALER) 18 MCG inhalation capsule Inhale contents of one capsule daily.   Respiratory Therapy Supplies (NEBULIZER " COMPRESSOR) KIT 1 Device 4 times daily as needed.   ORDER FOR DME Use your CPAP device as directed by your provider. Pressure change to min 13 max 18cwp   albuterol (PROAIR HFA, PROVENTIL HFA, VENTOLIN HFA) 108 (90 BASE) MCG/ACT inhaler Inhale 2 puffs into the lungs every 6 hours as needed.     No current facility-administered medications on file prior to visit.       Labs  Results for BRAXTON SALMERON (MRN [id number]) as of 3/15/2017 08:41   Ref. Range 3/15/2017 07:54   Sodium Latest Ref Range: 133 - 144 mmol/L 137   Potassium Latest Ref Range: 3.4 - 5.3 mmol/L 3.5   Chloride Latest Ref Range: 94 - 109 mmol/L 101   Carbon Dioxide Latest Ref Range: 20 - 32 mmol/L 27   Urea Nitrogen Latest Ref Range: 7 - 30 mg/dL 20   Creatinine Latest Ref Range: 0.52 - 1.04 mg/dL 1.01   GFR Estimate Latest Ref Range: >60 mL/min/1.7m2 60 (L)   GFR Estimate If Black Latest Ref Range: >60 mL/min/1.7m2 72   Calcium Latest Ref Range: 8.5 - 10.1 mg/dL 8.5   Anion Gap Latest Ref Range: 3 - 14 mmol/L 8   N-Terminal Pro Bnp Latest Ref Range: 0 - 125 pg/mL 21   Glucose Latest Ref Range: 70 - 99 mg/dL 230 (H)   INR Latest Ref Range: 0.86 - 1.14  3.47 (H)     Imaging could not fit into MRI  1. HR 62 bpm  2. /78/100 mmHg  3. RA 16/15/13   4. RV 35/12  5. PA 32/17/23   6. PCW 25/25/20   7. PA sat 62%   8. PCW sat N/A  9. Hgb 10.7 g/dL   10. Catarina CO 5.1   11. Catarina CI 1.8   12. TD CO 7.3   13. TD CI 2.5   14. PVR 0.4  15. SVR 1122      Assessment/Plan     1. Take two extra potassium today  2.  Zeo patch to discern rhythm  3   Enroll in out-patient cardiac rehabilitation: diagnosis systolic and diastolic heart failure with EF of 35%.        Answers for HPI/ROS submitted by the patient on 3/14/2017   General Symptoms: No  Skin Symptoms: No  HENT Symptoms: No  EYE SYMPTOMS: No  HEART SYMPTOMS: Yes  LUNG SYMPTOMS: Yes  INTESTINAL SYMPTOMS: No  URINARY SYMPTOMS: No  GYNECOLOGIC SYMPTOMS: Yes  BREAST SYMPTOMS: No  SKELETAL SYMPTOMS: No  BLOOD  SYMPTOMS: No  NERVOUS SYSTEM SYMPTOMS: No  MENTAL HEALTH SYMPTOMS: No  Cough: Yes  Sputum or phlegm: Yes  Coughing up blood: No  Difficulty breating or shortness of breath: Yes  Snoring: Yes  Wheezing: No  Difficulty breathing on exertion: Yes  Respiratory pain: No  Nighttime Cough: No  Difficulty breathing when lying flat: Yes  Chest pain or pressure: Yes  Fast or irregular heartbeat: Yes  Pain in legs with walking: No  Swelling in feet or ankles: Yes  Trouble breathing while lying down: Yes  Fingers or Toes appear blue: No  High blood pressure: Yes  Low blood pressure: No  Fainting: No  Murmurs: No  Chest pain on exertion: Yes  Chest pain at rest: No  Cramping pain in leg during exercise: No  Pacemaker: No  Varicose veins: No  Edema or swelling: Yes  Fast heart beat: Yes  Wake up at night with shortness of breath: No  Heart flutters: Yes  Light-headedness: No  Exercise intolerance: Yes  Bleeding or spotting between periods: No  Heavy or painful periods: No  Irregular periods: Yes  Vaginal discharge: No  Hot flashes: No  Vaginal dryness: No  Genital ulcers: No  Reduced libido: No  Painful intercourse: No  Difficulty with sexual arousal: No  Post-menopausal bleeding: No

## 2017-03-14 NOTE — NURSING NOTE
"Chief Complaint   Patient presents with     RECHECK     Follow up cpap       Initial /70 (BP Location: Right arm, Patient Position: Chair, Cuff Size: Adult Large)  Pulse 93  Resp 20  Ht 1.702 m (5' 7\")  Wt (!) 208.7 kg (460 lb)  SpO2 100%  BMI 72.05 kg/m2 Estimated body mass index is 72.05 kg/(m^2) as calculated from the following:    Height as of this encounter: 1.702 m (5' 7\").    Weight as of this encounter: 208.7 kg (460 lb).  Medication Reconciliation: complete     Kameron ZAMBRANO      "

## 2017-03-14 NOTE — PROGRESS NOTES
Cc: Patient returns here today in follow up for ***    HPI: severe moderate ,mild JYOTI.  Baseline symptoms were:   .  PSG/HSAT  Comorbidities: see below.  Sleep comorbidities     New concerns:most nights, without living room,    Sleep Review of Systems:     Snoring, snort arousals, gasping while on therapy: wakeup with choke     Bedpartner c/o's of cpap: whistling     Heated humidity problems with rainout/excessive dryness, sore throat: adjusting     Opening of mouth while on therapy/excessive leak: fface     Swallowing air:n/a     Skin problems:no     Insufficient sleep, devoting <7 or more hours to sleep: 7     Problems falling or staying asleep with cpap: no     RLS: occasional    SLEEP SCHEDULE:  Bedtime: ***                       Range:***                  Sleep Latency:***  Wakeups:***  Refall  Final wakeup:***    Naps: ***    Response to therapy:***  Willingness to continue***    Equipment issues: ***    Download today:***    Full report scanned into medical record    Consultation***    A/P:      1. JYOTI  2. Obesity: wt about the same  3. Hypertension    Time spent with patient is 25 minutes, of which >50% was spent in counseling, education and coordination of care.

## 2017-03-14 NOTE — PATIENT INSTRUCTIONS
Until you are seen again in clinic in the next 12 months, please watch for a return of sleepiness which is usually due to a change in the followin) a decrease in usage of cpap  2) decrease in amount of time slept  3) a poor seal (often a function of not following cleaning recommendations or  replacement guidelines (3-6 months)  4)  increase in weight or use of sedating medications resulting in higher pressure needs     If you have tried to address these issues but are still sleepy, please call for an earlier appointment.    Please, do not drive if sleepy      With bathroom break, remove hose from headgear

## 2017-03-15 ENCOUNTER — ANTICOAGULATION THERAPY VISIT (OUTPATIENT)
Dept: ANTICOAGULATION | Facility: CLINIC | Age: 42
End: 2017-03-15

## 2017-03-15 ENCOUNTER — OFFICE VISIT (OUTPATIENT)
Dept: CARDIOLOGY | Facility: CLINIC | Age: 42
End: 2017-03-15
Attending: INTERNAL MEDICINE
Payer: MEDICARE

## 2017-03-15 VITALS
HEIGHT: 67 IN | OXYGEN SATURATION: 99 % | SYSTOLIC BLOOD PRESSURE: 141 MMHG | BODY MASS INDEX: 45.99 KG/M2 | DIASTOLIC BLOOD PRESSURE: 84 MMHG | HEART RATE: 102 BPM | WEIGHT: 293 LBS

## 2017-03-15 DIAGNOSIS — I48.19 PERSISTENT ATRIAL FIBRILLATION (H): ICD-10-CM

## 2017-03-15 DIAGNOSIS — I42.0 DILATED CARDIOMYOPATHY (H): Primary | ICD-10-CM

## 2017-03-15 DIAGNOSIS — Z79.01 LONG-TERM (CURRENT) USE OF ANTICOAGULANTS: ICD-10-CM

## 2017-03-15 DIAGNOSIS — Z79.01 CHRONIC ANTICOAGULATION: ICD-10-CM

## 2017-03-15 DIAGNOSIS — I50.32 CHRONIC DIASTOLIC HEART FAILURE (H): ICD-10-CM

## 2017-03-15 LAB
ANION GAP SERPL CALCULATED.3IONS-SCNC: 8 MMOL/L (ref 3–14)
BUN SERPL-MCNC: 20 MG/DL (ref 7–30)
CALCIUM SERPL-MCNC: 8.5 MG/DL (ref 8.5–10.1)
CHLORIDE SERPL-SCNC: 101 MMOL/L (ref 94–109)
CO2 SERPL-SCNC: 27 MMOL/L (ref 20–32)
CREAT SERPL-MCNC: 1.01 MG/DL (ref 0.52–1.04)
GFR SERPL CREATININE-BSD FRML MDRD: 60 ML/MIN/1.7M2
GLUCOSE SERPL-MCNC: 230 MG/DL (ref 70–99)
INR PPP: 3.47 (ref 0.86–1.14)
NT-PROBNP SERPL-MCNC: 21 PG/ML (ref 0–125)
POTASSIUM SERPL-SCNC: 3.5 MMOL/L (ref 3.4–5.3)
SODIUM SERPL-SCNC: 137 MMOL/L (ref 133–144)

## 2017-03-15 PROCEDURE — 85610 PROTHROMBIN TIME: CPT | Performed by: INTERNAL MEDICINE

## 2017-03-15 PROCEDURE — 80048 BASIC METABOLIC PNL TOTAL CA: CPT | Performed by: INTERNAL MEDICINE

## 2017-03-15 PROCEDURE — 83880 ASSAY OF NATRIURETIC PEPTIDE: CPT | Performed by: INTERNAL MEDICINE

## 2017-03-15 PROCEDURE — 36415 COLL VENOUS BLD VENIPUNCTURE: CPT | Performed by: INTERNAL MEDICINE

## 2017-03-15 PROCEDURE — 0296T ZIO PATCH HOLTER: CPT | Mod: ZF | Performed by: INTERNAL MEDICINE

## 2017-03-15 PROCEDURE — 99212 OFFICE O/P EST SF 10 MIN: CPT | Mod: ZF

## 2017-03-15 PROCEDURE — 0296T ZZHC  EXT ECG > 48HR TO 21 DAY RCRD W/CONECT INTL RCRD: CPT | Mod: ZF

## 2017-03-15 PROCEDURE — 99214 OFFICE O/P EST MOD 30 MIN: CPT | Mod: ZP | Performed by: INTERNAL MEDICINE

## 2017-03-15 ASSESSMENT — PAIN SCALES - GENERAL: PAINLEVEL: NO PAIN (0)

## 2017-03-15 NOTE — NURSING NOTE
Per Dr. Percy Alcala, patient to have 14 day ziopatch monitor placed.  Diagnosis: atrial flutter  Monitor placed: Yes  Patient Instructed: Yes  Patient verbalized understanding: Yes  Holter # [id number]      Placed by thanh kauffman CMA

## 2017-03-15 NOTE — NURSING NOTE
Chief Complaint   Patient presents with     Follow Up For     f/u for diastolic heart failure     Cardiology Providers you saw during your visit:  Dr. Alcala    Recommendations:  Please take 2 extra Potasium pills today. Start cardiac rehab over at [facility]. You will need to wear a Ziopatch monitor for 14 days.    Follow-up:  In 6 weeks with Dr. Alcala.    Med Reconcile: Reviewed and verified all current medications with the patient. The updated medication list was printed and given to the patient.  Return Appointment: Patient given instructions regarding scheduling next clinic visit. Patient demonstrated understanding of this information and agreed to call with further questions or concerns.  Patient stated she understood all health information given and agreed to call with further questions or concerns.

## 2017-03-15 NOTE — PROGRESS NOTES
ANTICOAGULATION FOLLOW-UP CLINIC VISIT    Patient Name:  Pricilla Brown  Date:  3/15/2017  Contact Type:  Telephone    SUBJECTIVE:        OBJECTIVE    INR   Date Value Ref Range Status   03/15/2017 3.47 (H) 0.86 - 1.14 Final     Chromogenic Factor 10   Date Value Ref Range Status   10/18/2016 20 (L) 70 - 130 % Final     Comment:     Therapeutic Range:  A Chromogenic Factor 10 level of approximately 20-40%   inversely correlates with an INR of 2-3 for patients receiving Warfarin.   Chromogenic Factor 10 levels below 20% indicate an INR greater than 3 and   levels above 40% indicate an INR less than 2.         ASSESSMENT / PLAN  INR assessment SUPRA    Recheck INR In: 1 WEEK    INR Location Clinic      Anticoagulation Summary as of 3/15/2017     INR goal 2.0-2.5   Today's INR 3.47!   Maintenance plan 20 mg (5 mg x 4) on Mon, Fri; 15 mg (5 mg x 3) all other days   Full instructions 3/15: 10 mg; Otherwise 20 mg on Mon, Fri; 15 mg all other days   Weekly total 115 mg   Plan last modified Velvet Asencio, RN (3/15/2017)   Next INR check 3/22/2017   Priority INR   Target end date Indefinite    Indications   Atrial fibrillation (H) [I48.91] [I48.91]  Long-term (current) use of anticoagulants [Z79.01] [Z79.01]         Anticoagulation Episode Summary     INR check location Clinic Lab    Preferred lab     Send INR reminders to [facility]    Comments Contact Patient at [phone]      Anticoagulation Care Providers     Provider Role Specialty Phone number    Percy Alcala MD  Cardiology [phone]            See the Encounter Report to view Anticoagulation Flowsheet and Dosing Calendar (Go to Encounters tab in chart review, and find the Anticoagulation Therapy Visit)    Spoke with Pricilla.     Velvet Asencio, RN

## 2017-03-15 NOTE — LETTER
3/15/2017      RE: Pricilla Brown  [address] [place] [id number]       Dear Colleague,    Thank you for the opportunity to participate in the care of your patient, Pricilla Brown, at the [facility] at [facility]. Please see a copy of my visit note below.    Percy Alcala M.D.  Cardiovascular Medicine    I personally saw and examined this patient, discussed care with housestaff and other consultants, reviewed current laboratories and imaging studies, and conveyed impression and diagnostic/therapeutic plan to patient.    Problem List  Atrial fibrillation    SOB (shortness of breath  Chronic diastolic heart failure    Dilated cardiomyopathy    Chronic atrial fibrillation    Cellulitis  Long-term  use of anticoagulants    Plantar fasciitis  Neuropathic pain of lower extremity  Peritonsillar abscess  Asthma  Azotemia  Hypothyroidism following radioiodine therapy  JYOTI (obstructive sleep apnea) CPAP Min Pressure of 13 and Max Pressure of 18  Chronic anticoagulation for a-fib  Morbid obesity    Diabetes mellitus, type 2  History    Patient returns with palpitations without chest pain, pre-syncope, syncope, PND, orthopnea, weight change, stimulant usage, thromboembolic or bleeding complications.  Previous atrial fibrillation and chronic warfarin anti-coagulation.  .    Objective    Constitutional: alert, oriented, normal gait and station, normal mentation.  Oral: moist mucous membrans  Lymph: without pathologic adenopathy  Chest: clear to ausculation and percussion  Cor: No evidence of left or right ventricular activity.  Rhythm is regular.  S1 normal, S2 split physiologically. Murmurs are not present  Abdomen: without tenderness, rebound, guarding, masses, ascites  Extremities: Edema not present  Neuro: no focal defects, normal mentation  Skin: without open lesions  Psych: oriented, verbal, mental status in tact        Wt Readings from Last 5 Encounters:    03/14/17 (!) 208.7 kg (460 lb)   02/13/17 (!) 209.5 kg (461 lb 12.8 oz)   02/07/17 (!) 211.9 kg (467 lb 1.6 oz)   02/06/17 (!) 211.4 kg (466 lb 0.8 oz)   02/03/17 (!) 211.4 kg (466 lb)       Meds    Current Outpatient Prescriptions on File Prior to Visit:  potassium chloride SA (POTASSIUM CHLORIDE) 20 MEQ CR tablet Take 2 tablets (40 mEq) by mouth 4 times daily   blood glucose monitoring (ACCU-CHEK FASTCLIX) lancets Use to test blood sugar 4 times daily or as directed.   blood glucose monitoring (ACCU-CHEK SMARTVIEW) test strip Use to test blood sugar 5 times daily   diazepam (VALIUM) 5 MG tablet Hand carry to procedure on 2/21. Do not take until instructed.   torsemide (DEMADEX) 100 MG tablet Take 1 tablet (100 mg) by mouth 2 times daily   insulin glargine U-300 (TOUJEO) 300 UNIT/ML injection Inject 160 units SQ each am.   NOVOLOG FLEXPEN 100 UNIT/ML soln Inject 60 units with meals plus correction. Pt uses approx 180 units in 24 hrs.   polyethylene glycol (MIRALAX/GLYCOLAX) powder    topiramate (TOPAMAX) 25 MG tablet Take 3 tablets (75 mg) by mouth 2 times daily   acetaminophen (TYLENOL) 325 MG tablet Take 2 tablets (650 mg) by mouth 3 times daily as needed for mild pain or fever (total acetaminophen dose should not exceed 3000 mg per day)   levothyroxine (SYNTHROID/LEVOTHROID) 200 MCG tablet Take 1 tablet (200 mcg) by mouth daily   morphine (MS CONTIN) 15 MG 12 hr tablet Take 15 mg by mouth daily as needed   JANTOVEN 10 MG tablet Take 10 mg by mouth daily as needed   buPROPion (WELLBUTRIN SR) 100 MG 12 hr tablet Take 1 tablet (100 mg) by mouth 2 times daily   hydrALAZINE (APRESOLINE) 25 MG tablet Take 1 tab (25 mg) in am, 2 tabs (50 mg) midday, and 2 tabs (50 mg) in pm daily   order for DME Left foot   liraglutide (VICTOZA PEN) 18 MG/3ML soln Inject 1.8 mg Subcutaneous daily   diclofenac (VOLTAREN) 1 % GEL Place 4 g onto the skin 4 times daily   oxyCODONE (ROXICODONE) 10 MG immediate release tablet Take 1 tablet  "(10 mg) by mouth every 6 hours as needed for moderate to severe pain   warfarin (COUMADIN) 7.5 MG tablet Take 15 mg PO daily on 10/20, 10/21, 10/22. Check INR on 10/23. Further dosing per INR level.   metoprolol (TOPROL-XL) 50 MG 24 hr tablet Take 0.5 tablets (25 mg) by mouth At Bedtime   order for DME Equipment being ordered:  [id number] $92 [id number], fasciitis, LG, night splint   capsaicin (ZOSTRIX) 0.025 % CREA Apply 1 g topically 3 times daily as needed   order for DME Equipment being ordered: Challenger Wide walker if available - patient needs seat, basket and brakes.   nicotine ([employer] NICOTINE) 14 MG/24HR patch 2h hr Place 1 patch onto the skin every 24 hours   insulin pen needle (BD RIVER U/F) 32G X 4 MM Use 6 daily or as directed.   ciclopirox 8 % SOLN Externally apply topically daily To toenails.   nystatin (MYCOSTATIN) cream Apply topically 2 times daily To toenails   Efinaconazole 10 % SOLN Externally apply topically daily To toenails.   insulin pen needle (NOVOFINE) 30G X 8 MM Use 2X daily or as directed.   gabapentin (NEURONTIN) 300 MG capsule Take 2 capsules (600 mg) by mouth 2 times daily   spironolactone (ALDACTONE) 50 MG tablet Take 1 tablet (50 mg) by mouth daily   insulin syringe-needle U-100 (BD INSULIN SYRINGE ULTRAFINE) 30G X 1/2\" 0.5 ML Use one syringe 3 daily or as directed.   blood glucose monitoring (ONE TOUCH DELICA) lancets Use to test blood sugars 4 times daily or as directed.   oxyCODONE-acetaminophen (PERCOCET) 5-325 MG per tablet Take 1 tablet by mouth every 8 hours as needed for moderate to severe pain (try to limit use, no further prescriptions until seen in pain clinic)   blood glucose (ACCU-CHEK RON) test strip Use to test blood sugars 2 times daily or as directed.   DULoxetine (CYMBALTA) 20 MG capsule Take 1 capsule (20 mg) by mouth 2 times daily   senna (SENOKOT) 8.6 MG tablet Take 1 tablet by mouth 2 times daily   bisacodyl (DULCOLAX) 10 MG suppository Place 1 suppository " (10 mg) rectally daily as needed for constipation   Blood Glucose Monitoring Suppl (ACCU-CHEK RON PLUS) W/DEVICE KIT Use to test blood sugars 2 times daily or as directed.   tiotropium (SPIRIVA HANDIHALER) 18 MCG inhalation capsule Inhale contents of one capsule daily.   Respiratory Therapy Supplies (NEBULIZER COMPRESSOR) KIT 1 Device 4 times daily as needed.   ORDER FOR DME Use your CPAP device as directed by your provider. Pressure change to min 13 max 18cwp   albuterol (PROAIR HFA, PROVENTIL HFA, VENTOLIN HFA) 108 (90 BASE) MCG/ACT inhaler Inhale 2 puffs into the lungs every 6 hours as needed.     No current facility-administered medications on file prior to visit.       Labs  Results for BRAXTON SALMERON (MRN [id number]) as of 3/15/2017 08:41   Ref. Range 3/15/2017 07:54   Sodium Latest Ref Range: 133 - 144 mmol/L 137   Potassium Latest Ref Range: 3.4 - 5.3 mmol/L 3.5   Chloride Latest Ref Range: 94 - 109 mmol/L 101   Carbon Dioxide Latest Ref Range: 20 - 32 mmol/L 27   Urea Nitrogen Latest Ref Range: 7 - 30 mg/dL 20   Creatinine Latest Ref Range: 0.52 - 1.04 mg/dL 1.01   GFR Estimate Latest Ref Range: >60 mL/min/1.7m2 60 (L)   GFR Estimate If Black Latest Ref Range: >60 mL/min/1.7m2 72   Calcium Latest Ref Range: 8.5 - 10.1 mg/dL 8.5   Anion Gap Latest Ref Range: 3 - 14 mmol/L 8   N-Terminal Pro Bnp Latest Ref Range: 0 - 125 pg/mL 21   Glucose Latest Ref Range: 70 - 99 mg/dL 230 (H)   INR Latest Ref Range: 0.86 - 1.14  3.47 (H)     Imaging could not fit into MRI  1. HR 62 bpm  2. /78/100 mmHg  3. RA 16/15/13   4. RV 35/12  5. PA 32/17/23   6. PCW 25/25/20   7. PA sat 62%   8. PCW sat N/A  9. Hgb 10.7 g/dL   10. Catarina CO 5.1   11. Catarina CI 1.8   12. TD CO 7.3   13. TD CI 2.5   14. PVR 0.4  15. SVR 1122      Assessment/Plan     1. Take two extra potassium today  2.  Zeo patch to discern rhythm  3   Enroll in out-patient cardiac rehabilitation: diagnosis systolic and diastolic heart failure with EF of 35%.         Answers for HPI/ROS submitted by the patient on 3/14/2017   General Symptoms: No  Skin Symptoms: No  HENT Symptoms: No  EYE SYMPTOMS: No  HEART SYMPTOMS: Yes  LUNG SYMPTOMS: Yes  INTESTINAL SYMPTOMS: No  URINARY SYMPTOMS: No  GYNECOLOGIC SYMPTOMS: Yes  BREAST SYMPTOMS: No  SKELETAL SYMPTOMS: No  BLOOD SYMPTOMS: No  NERVOUS SYSTEM SYMPTOMS: No  MENTAL HEALTH SYMPTOMS: No  Cough: Yes  Sputum or phlegm: Yes  Coughing up blood: No  Difficulty breating or shortness of breath: Yes  Snoring: Yes  Wheezing: No  Difficulty breathing on exertion: Yes  Respiratory pain: No  Nighttime Cough: No  Difficulty breathing when lying flat: Yes  Chest pain or pressure: Yes  Fast or irregular heartbeat: Yes  Pain in legs with walking: No  Swelling in feet or ankles: Yes  Trouble breathing while lying down: Yes  Fingers or Toes appear blue: No  High blood pressure: Yes  Low blood pressure: No  Fainting: No  Murmurs: No  Chest pain on exertion: Yes  Chest pain at rest: No  Cramping pain in leg during exercise: No  Pacemaker: No  Varicose veins: No  Edema or swelling: Yes  Fast heart beat: Yes  Wake up at night with shortness of breath: No  Heart flutters: Yes  Light-headedness: No  Exercise intolerance: Yes  Bleeding or spotting between periods: No  Heavy or painful periods: No  Irregular periods: Yes  Vaginal discharge: No  Hot flashes: No  Vaginal dryness: No  Genital ulcers: No  Reduced libido: No  Painful intercourse: No  Difficulty with sexual arousal: No  Post-menopausal bleeding: No      Please do not hesitate to contact me if you have any questions/concerns.     Sincerely,     Percy Alcala MD

## 2017-03-15 NOTE — MR AVS SNAPSHOT
Pricilla Brown   3/15/2017   Anticoagulation Therapy Visit    Description:  42 year old female   Provider:  Velvet Asencio, RN   Department:  [facility]           INR as of 3/15/2017     Today's INR 3.47!      Anticoagulation Summary as of 3/15/2017     INR goal 2.0-2.5   Today's INR 3.47!   Full instructions 3/15: 10 mg; Otherwise 20 mg on Mon, Fri; 15 mg all other days   Next INR check 3/22/2017    Indications   Atrial fibrillation (H) [I48.91] [I48.91]  Long-term (current) use of anticoagulants [Z79.01] [Z79.01]         March 2017 Details    Sun Mon Tue Wed Thu Fri Sat        1               2               3               4                 5               6               7               8               9               10               11                 12               13               14               15      10 mg   See details      16      15 mg         17      20 mg         18      15 mg           19      15 mg         20      20 mg         21      15 mg         22            23               24               25                 26               27               28               29               30               31                 Date Details   03/15 This INR check       Date of next INR:  3/22/2017         How to take your warfarin dose     To take:  10 mg Take 2 of the 5 mg tablets.    To take:  15 mg Take 3 of the 5 mg tablets.    To take:  20 mg Take 4 of the 5 mg tablets.

## 2017-03-15 NOTE — MR AVS SNAPSHOT
After Visit Summary   3/15/2017    Pricilla Brown    MRN: [id number]           Patient Information     Date Of Birth          1975        Visit Information        Provider Department      3/15/2017 9:00 AM Percy Alcala MD [facility]        Today's Diagnoses     Dilated cardiomyopathy (H)    -  1      Care Instructions    You were seen today in the Cardiovascular Clinic at the [facility].      Cardiology Providers you saw during your visit:  Dr. Alcala    Recommendations:  Please take 2 extra Potasium pills today. Start cardiac rehab over at [place]. You will need to wear a Ziopatch monitor for 14 days.    Follow-up:  In 6 weeks with Dr. Alcala.    Thank you for your visit today!     Please call if you have any questions or concerns.  Cardiology Care Coordinator  So Cardenas RN    For scheduling needs [phone] option 1 and the option 1 again.  Nursing questions: [phone] option 1 then chose option 3 for the triage nurse.  For emergencies call [phone].                Follow-ups after your visit        Additional Services     CARDIAC REHAB REFERRAL       Your provider has referred you to: [facility]: [facility] [phone]   [email or web address]    Diastolic and Systolic heart failure EF 35%.                  Follow-up notes from your care team     Return in about 6 weeks (around 4/26/2017), or if symptoms worsen or fail to improve, for Fredrick heart failure.      Your next 10 appointments already scheduled     Mar 28, 2017 11:30 AM CDT   Nurse Visit with  Surgery Nurse   General Surgery ([facility])    [address] [place] [id number]   [phone]            Apr 06, 2017  2:00 PM CDT   (Arrive by 1:45 PM)   Return Weight Management Visit with Steph Kim PA-C   [facility] (  [facility])    [address] [place] [id number]   [phone]            Apr 17, 2017 11:30 AM CDT   NUTRITION VISIT with Paulette Ruiz RD   [facility] ([facility])    [address] [place] [id number]   [phone]            May 03, 2017  8:30 AM CDT   (Arrive by 8:15 AM)   RETURN HEART FAILURE with Percy Alcala MD   [facility] ([facility])    [address] [place] [id number]   [phone]            May 04, 2017 11:00 AM CDT   Return Visit with Norman Jean DPM   [facility] ([facility])    [address] [place] [id number] [phone]            May 09, 2017 11:15 AM CDT   (Arrive by 11:00 AM)   Return Visit with Silva Damon MD   [facility] ([facility])    [address] [place] [address]   [phone]              Who to contact     If you have questions or need follow up information about today's clinic visit or your schedule please contact [facility] directly at [phone].  Normal or non-critical lab and imaging results will be communicated to you by Presentigohart, letter or phone within 4 business days after the clinic has received the results. If you do not hear from us within 7 days, please contact the clinic through Presentigohart or phone. If you have a critical or abnormal lab result, we will notify you by phone as soon as possible.  Submit refill requests through Erecruit or call your pharmacy and they will forward the refill request to us. Please allow 3 business days for your refill to be completed.          Additional Information About Your Visit        Erecruit Information     Erecruit gives you secure access to your electronic health record. If you see a  "primary care provider, you can also send messages to your care team and make appointments. If you have questions, please call your primary care clinic.  If you do not have a primary care provider, please call [phone] and they will assist you.        Care EveryWhere ID     This is your Care EveryWhere ID. This could be used by other organizations to access your [facility] medical records  [id number]        Your Vitals Were     Pulse Height Pulse Oximetry BMI (Body Mass Index)          102 1.702 m (5' 7\") 99% 73.06 kg/m2         Blood Pressure from Last 3 Encounters:   03/15/17 141/84   03/14/17 125/70   02/07/17 (!) 149/91    Weight from Last 3 Encounters:   03/15/17 (!) 211.6 kg (466 lb 8 oz)   03/14/17 (!) 208.7 kg (460 lb)   02/13/17 (!) 209.5 kg (461 lb 12.8 oz)              We Performed the Following     CARDIAC REHAB REFERRAL     Ziopatch Holter Monitor - Adult        Primary Care Provider Office Phone # Fax #    Jamilah Bernal [phone]       [address]  [facility] [address]        Thank you!     Thank you for choosing [facility]  for your care. Our goal is always to provide you with excellent care. Hearing back from our patients is one way we can continue to improve our services. Please take a few minutes to complete the written survey that you may receive in the mail after your visit with us. Thank you!             Your Updated Medication List - Protect others around you: Learn how to safely use, store and throw away your medicines at www.disposemymeds.org.          This list is accurate as of: 3/15/17  9:22 AM.  Always use your most recent med list.                   Brand Name Dispense Instructions for use    acetaminophen 325 MG tablet    TYLENOL    60 tablet    Take 2 tablets (650 mg) by mouth 3 times daily as needed for mild pain or fever (total acetaminophen dose should not exceed 3000 mg per day)       albuterol 108 (90 BASE) MCG/ACT " Inhaler    PROAIR HFA/PROVENTIL HFA/VENTOLIN HFA    1 Inhaler    Inhale 2 puffs into the lungs every 6 hours as needed.       bisacodyl 10 MG Suppository    DULCOLAX    6 suppository    Place 1 suppository (10 mg) rectally daily as needed for constipation       * blood glucose monitoring lancets     1 Box    Use to test blood sugars 4 times daily or as directed.       * blood glucose monitoring lancets     4 Box    Use to test blood sugar 4 times daily or as directed.       blood glucose monitoring meter device kit     1 kit    Use to test blood sugars 2 times daily or as directed.       * blood glucose monitoring test strip    ACCU-CHEK RON    100 strip    Use to test blood sugars 2 times daily or as directed.       * blood glucose monitoring test strip    ACCU-CHEK SMARTVIEW    450 each    Use to test blood sugar 5 times daily       buPROPion 100 MG 12 hr tablet    WELLBUTRIN SR    60 tablet    Take 1 tablet (100 mg) by mouth 2 times daily       capsaicin 0.025 % Crea cream    ZOSTRIX    1 Tube    Apply 1 g topically 3 times daily as needed       ciclopirox 8 % Soln     1 Bottle    Externally apply topically daily To toenails.       diazepam 5 MG tablet    VALIUM    1 tablet    Hand carry to procedure on 2/21. Do not take until instructed.       diclofenac 1 % Gel topical gel    VOLTAREN    100 g    Place 4 g onto the skin 4 times daily       DULoxetine 20 MG EC capsule    CYMBALTA    60 capsule    Take 1 capsule (20 mg) by mouth 2 times daily       Efinaconazole 10 % Soln     8 mL    Externally apply topically daily To toenails.       gabapentin 300 MG capsule    NEURONTIN    120 capsule    Take 2 capsules (600 mg) by mouth 2 times daily       hydrALAZINE 25 MG tablet    APRESOLINE    150 tablet    Take 1 tab (25 mg) in am, 2 tabs (50 mg) midday, and 2 tabs (50 mg) in pm daily       insulin glargine U-300 300 UNIT/ML injection    TOUJEO    15 mL    Inject 160 units SQ each am.       * insulin pen needle 30G X 8  "MM    NOVOFINE    100 each    Use 2X daily or as directed.       * insulin pen needle 32G X 4 MM    BD RIVER U/F    200 each    Use 6 daily or as directed.       insulin syringe-needle U-100 30G X 1/2\" 0.5 ML    BD insulin syringe ULTRAFINE    100 each    Use one syringe 3 daily or as directed.       levothyroxine 200 MCG tablet    SYNTHROID/LEVOTHROID    90 tablet    Take 1 tablet (200 mcg) by mouth daily       liraglutide 18 MG/3ML soln    VICTOZA PEN    27 mL    Inject 1.8 mg Subcutaneous daily       metoprolol 50 MG 24 hr tablet    TOPROL-XL    90 tablet    Take 0.5 tablets (25 mg) by mouth At Bedtime       morphine 15 MG 12 hr tablet    MS CONTIN     Take 15 mg by mouth daily as needed       Nebulizer Compressor Kit     1 kit    1 Device 4 times daily as needed.       nicotine 14 MG/24HR 24 hr patch    [employer] NICOTINE    30 patch    Place 1 patch onto the skin every 24 hours       NovoLOG FLEXPEN 100 UNIT/ML injection   Generic drug:  insulin aspart     60 mL    Inject 60 units with meals plus correction. Pt uses approx 180 units in 24 hrs.       nystatin cream    MYCOSTATIN    90 g    Apply topically 2 times daily To toenails       order for DME     1 each    Use your CPAP device as directed by your provider. Pressure change to min 13 max 18cwp       * order for DME     1 each    Equipment being ordered: Challenger Herminia walker if available - patient needs seat, basket and brakes.       * order for DME     1 each    Equipment being ordered:  [id number] $92  Plantar, fasciitis, LG, night splint       * order for DME     1 Units    Left foot       oxyCODONE 10 MG IR tablet    ROXICODONE    12 tablet    Take 1 tablet (10 mg) by mouth every 6 hours as needed for moderate to severe pain       oxyCODONE-acetaminophen 5-325 MG per tablet    PERCOCET    60 tablet    Take 1 tablet by mouth every 8 hours as needed for moderate to severe pain (try to limit use, no further prescriptions until seen in pain clinic)       " polyethylene glycol powder    MIRALAX/GLYCOLAX         potassium chloride SA 20 MEQ CR tablet    potassium chloride    240 tablet    Take 2 tablets (40 mEq) by mouth 4 times daily       senna 8.6 MG tablet    SENOKOT    45 tablet    Take 1 tablet by mouth 2 times daily       spironolactone 50 MG tablet    ALDACTONE    30 tablet    Take 1 tablet (50 mg) by mouth daily       tiotropium 18 MCG capsule    SPIRIVA HANDIHALER    30 capsule    Inhale contents of one capsule daily.       topiramate 25 MG tablet    TOPAMAX    180 tablet    Take 3 tablets (75 mg) by mouth 2 times daily       torsemide 100 MG tablet    DEMADEX    180 tablet    Take 1 tablet (100 mg) by mouth 2 times daily       * warfarin 7.5 MG tablet    COUMADIN    30 tablet    Take 15 mg PO daily on 10/20, 10/21, 10/22. Check INR on 10/23. Further dosing per INR level.       * JANTOVEN 10 MG tablet   Generic drug:  warfarin      Take 10 mg by mouth daily as needed       * Notice:  This list has 11 medication(s) that are the same as other medications prescribed for you. Read the directions carefully, and ask your doctor or other care provider to review them with you.

## 2017-03-15 NOTE — NURSING NOTE
Chief Complaint   Patient presents with     Follow Up For     f/u for diastolic heart failure     chest discomfort/ having fluttering/ yes to sob

## 2017-03-15 NOTE — PATIENT INSTRUCTIONS
You were seen today in the Cardiovascular Clinic at the [facility].      Cardiology Providers you saw during your visit:  Dr. Alcala    Recommendations:  Please take 2 extra Potasium pills today. Start cardiac rehab over at [place]. You will need to wear a Ziopatch monitor for 14 days.    Follow-up:  In 6 weeks with Dr. Alcala.    Thank you for your visit today!     Please call if you have any questions or concerns.  Cardiology Care Coordinator  So Cardenas RN    For scheduling needs [phone] option 1 and the option 1 again.  Nursing questions: [phone] option 1 then chose option 3 for the triage nurse.  For emergencies call [phone].

## 2017-03-15 NOTE — PROGRESS NOTES
SLEEP MEDICINE CLINIC       DATE OF SLEEP CLINIC VISIT:  03/14/2017.       LOCATION:  [facility].        CHIEF COMPLAINT:  Ms. Pricilla Brown returns to clinic today to evaluate compliance and response to CPAP, as well as evaluation of sleep habits to improve time on therapy.      HISTORY OF PRESENT ILLNESS:  History of severe obstructive sleep apnea with a polysomnogram, 03/2013, showing an AHI of 68.9.  Time less than 89% at 3.1 minutes.  With last visit, her average usage on days used was 5 hours and 31 minutes.  She showed 7 days without usage.  Largest concern at this visit was missed opportunities.  Overnight oximetry done on 05/23/2016 showed O2 sats less than 89%, 48 seconds.      Download today:  Use of APAP 29/30 days.  Average usage on days used 8 hours and 53 minutes, 90th percentile pressure 15.3 cm of water.  Average time in large leak 1 hour and 26 minutes.  Residual AHI is 0.9.  Daily usage shows some pancaking while she was sick with an upper respiratory as well as watching videos at nights and having CPAP on just in case she would fall asleep.  She is on an APAP 14 cm of water min and 20 cm water max EPAP.     Sleep Review of Systems:     Snoring, snort arousals, gasping while on therapy: rare wakeup with choke     Bedpartner c/o's of cpap: whistling     Heated humidity problems with rainout/excessive dryness, sore throat: adjusting     Opening of mouth while on therapy/excessive leak: (fface-FHME)     Swallowing air:n/a     Skin problems:no     Insufficient sleep, devoting <7 or more hours to sleep: 7 estimated     Problems falling or staying asleep with cpap: no   RLS: occasional  Allergies:    Allergies   Allergen Reactions     Penicillins Other (See Comments)     CHILDHOOD ALLERGY     Ibuprofen Sodium Hives and Rash       Medications:    Current Outpatient Prescriptions   Medication Sig Dispense Refill     potassium chloride SA (POTASSIUM CHLORIDE) 20 MEQ CR tablet Take  2 tablets (40 mEq) by mouth 4 times daily 240 tablet 3     blood glucose monitoring (ACCU-CHEK FASTCLIX) lancets Use to test blood sugar 4 times daily or as directed. 4 Box 2     blood glucose monitoring (ACCU-CHEK SMARTVIEW) test strip Use to test blood sugar 5 times daily 450 each 3     diazepam (VALIUM) 5 MG tablet Hand carry to procedure on 2/21. Do not take until instructed. 1 tablet 0     torsemide (DEMADEX) 100 MG tablet Take 1 tablet (100 mg) by mouth 2 times daily 180 tablet 1     insulin glargine U-300 (TOUJEO) 300 UNIT/ML injection Inject 160 units SQ each am. 15 mL 3     NOVOLOG FLEXPEN 100 UNIT/ML soln Inject 60 units with meals plus correction. Pt uses approx 180 units in 24 hrs. 60 mL 11     polyethylene glycol (MIRALAX/GLYCOLAX) powder        topiramate (TOPAMAX) 25 MG tablet Take 3 tablets (75 mg) by mouth 2 times daily 180 tablet 3     acetaminophen (TYLENOL) 325 MG tablet Take 2 tablets (650 mg) by mouth 3 times daily as needed for mild pain or fever (total acetaminophen dose should not exceed 3000 mg per day) 60 tablet 0     levothyroxine (SYNTHROID/LEVOTHROID) 200 MCG tablet Take 1 tablet (200 mcg) by mouth daily 90 tablet 3     morphine (MS CONTIN) 15 MG 12 hr tablet Take 15 mg by mouth daily as needed       JANTOVEN 10 MG tablet Take 10 mg by mouth daily as needed       buPROPion (WELLBUTRIN SR) 100 MG 12 hr tablet Take 1 tablet (100 mg) by mouth 2 times daily 60 tablet 3     hydrALAZINE (APRESOLINE) 25 MG tablet Take 1 tab (25 mg) in am, 2 tabs (50 mg) midday, and 2 tabs (50 mg) in pm daily 150 tablet 3     order for DME Left foot 1 Units 0     liraglutide (VICTOZA PEN) 18 MG/3ML soln Inject 1.8 mg Subcutaneous daily 27 mL 3     diclofenac (VOLTAREN) 1 % GEL Place 4 g onto the skin 4 times daily 100 g 0     oxyCODONE (ROXICODONE) 10 MG immediate release tablet Take 1 tablet (10 mg) by mouth every 6 hours as needed for moderate to severe pain 12 tablet 0     warfarin (COUMADIN) 7.5 MG tablet  "Take 15 mg PO daily on 10/20, 10/21, 10/22. Check INR on 10/23. Further dosing per INR level. 30 tablet 0     metoprolol (TOPROL-XL) 50 MG 24 hr tablet Take 0.5 tablets (25 mg) by mouth At Bedtime 90 tablet 3     order for DME Equipment being ordered:  [id number] $92   Plantar, fasciitis, LG, night splint 1 each 0     capsaicin (ZOSTRIX) 0.025 % CREA Apply 1 g topically 3 times daily as needed 1 Tube 0     order for DME Equipment being ordered: Challenger Wide walker if available - patient needs seat, basket and brakes. 1 each 0     nicotine ([employer] NICOTINE) 14 MG/24HR patch 2h hr Place 1 patch onto the skin every 24 hours 30 patch 1     insulin pen needle (BD RIVER U/F) 32G X 4 MM Use 6 daily or as directed. 200 each 11     ciclopirox 8 % SOLN Externally apply topically daily To toenails. 1 Bottle 11     nystatin (MYCOSTATIN) cream Apply topically 2 times daily To toenails 90 g 6     Efinaconazole 10 % SOLN Externally apply topically daily To toenails. 8 mL 11     insulin pen needle (NOVOFINE) 30G X 8 MM Use 2X daily or as directed. 100 each 3     gabapentin (NEURONTIN) 300 MG capsule Take 2 capsules (600 mg) by mouth 2 times daily 120 capsule 5     spironolactone (ALDACTONE) 50 MG tablet Take 1 tablet (50 mg) by mouth daily 30 tablet 11     insulin syringe-needle U-100 (BD INSULIN SYRINGE ULTRAFINE) 30G X 1/2\" 0.5 ML Use one syringe 3 daily or as directed. 100 each prn     blood glucose monitoring (ONE TOUCH DELICA) lancets Use to test blood sugars 4 times daily or as directed. 1 Box prn     oxyCODONE-acetaminophen (PERCOCET) 5-325 MG per tablet Take 1 tablet by mouth every 8 hours as needed for moderate to severe pain (try to limit use, no further prescriptions until seen in pain clinic) 60 tablet 0     blood glucose (ACCU-CHEK RON) test strip Use to test blood sugars 2 times daily or as directed. 100 strip 11     DULoxetine (CYMBALTA) 20 MG capsule Take 1 capsule (20 mg) by mouth 2 times daily 60 capsule 0 "     senna (SENOKOT) 8.6 MG tablet Take 1 tablet by mouth 2 times daily 45 tablet 0     bisacodyl (DULCOLAX) 10 MG suppository Place 1 suppository (10 mg) rectally daily as needed for constipation 6 suppository 0     Blood Glucose Monitoring Suppl (ACCU-CHEK RON PLUS) W/DEVICE KIT Use to test blood sugars 2 times daily or as directed. 1 kit 0     tiotropium (SPIRIVA HANDIHALER) 18 MCG inhalation capsule Inhale contents of one capsule daily. 30 capsule 1     Respiratory Therapy Supplies (NEBULIZER COMPRESSOR) KIT 1 Device 4 times daily as needed. 1 kit 3     ORDER FOR DME Use your CPAP device as directed by your provider. Pressure change to min 13 max 18cwp 1 each 99     albuterol (PROAIR HFA, PROVENTIL HFA, VENTOLIN HFA) 108 (90 BASE) MCG/ACT inhaler Inhale 2 puffs into the lungs every 6 hours as needed. 1 Inhaler 11       Problem List:  Patient Active Problem List    Diagnosis Date Noted     Chest pain 02/01/2017     Priority: Medium     Cellulitis 10/14/2016     Priority: Medium     Atrial fibrillation (H) [I48.91] 06/06/2016     Priority: Medium     Long-term (current) use of anticoagulants [Z79.01] 06/06/2016     Priority: Medium     Plantar fasciitis 09/07/2015     Priority: Medium     Neuropathic pain of lower extremity 09/07/2015     Priority: Medium     Peritonsillar abscess 09/30/2014     Asthma 07/16/2013     Problem list name updated by automated process. Provider to review       Azotemia 04/30/2013     SOB (shortness of breath) 03/25/2013     Hypothyroidism following radioiodine therapy 03/07/2013     JYOTI (obstructive sleep apnea) CPAP Min Pressure of 13 and Max Pressure of 18 03/05/2013     3/4/13 PSG - AHI 62, started on APAP 13-18 with FFM       Chronic diastolic heart failure (H) 02/15/2013     CORE Pt  EDW is about 437#                 Chronic anticoagulation for a-fib 02/15/2013     INR's followed by coumadin clinic at        Morbid obesity (H) 01/30/2013     Diabetes mellitus, type 2 (H)  "01/30/2013     Dilated cardiomyopathy (H) 01/08/2013     Right Heart Procedure Note:  July 20, 2015  Indication: assessment of the filling pressures   Procedure   Access: 7 Fr sheath without complications in the right IJ  Findings   RA: 9/10/6  RV 30/7  PA 30/15/22  PCWP 16/16/14  Catarina CO 4.95 (1.69) TD CO 7.3 (2.5)   TPR 4.45 PVR 1.62  PaSat 59.3 Hb 13.4  Impression   - Normal RV and LV filling pressures.   - Cardiomyopathy       Right Heart Cath: 5/1/13  RHC, acute exacerbation of HF was ruled out yesterday. (RA 11, PA s29 ED12, PA 17 and PCWP 14.) Since last RHC in [place] the Cardiac index (Catarina) is up from 1.6 to1.9.  ECHO: 3/26/2013  Interpretation Summary  Moderate left ventricular dilation is present. The Ejection Fraction is   estimated at 40-45%. Pulmonary artery systolic pressure cannot be assessed.   The inferior vena cava is normal. Technically difficult study. Poor acoustic   windows.       Chronic atrial fibrillation (HCC) 01/05/2013        Past Medical/Surgical History:  Past Medical History   Diagnosis Date     A-fib (H) 2011     on coumadin since 1/13     Asthma      as a kid     Chest pain 2/1/2017     Chronic anticoagulation for a-fib 2/15/2013     INR's followed by coumadin clinic at      Diabetes mellitus (H) 2012     Diastolic heart failure 2/15/2013     Dilated cardiomyopathy (H) 1/8/2013     HTN (hypertension)      Hyperthyroidism      Graves, s/p I131 1/13, now on prednisone and methimazole     Morbid obesity (H)      Pulmonary embolism (H) 1/12     hospitalized in [place], on lovenox/coumadin for a few months but stopped, hypercoag w/u neg per pt     Sleep apnea      using CPAP     No past surgical history on file.        Physical Examination:  Vitals: /70 (BP Location: Right arm, Patient Position: Chair, Cuff Size: Adult Large)  Pulse 93  Resp 20  Ht 1.702 m (5' 7\")  Wt (!) 208.7 kg (460 lb)  SpO2 100%  BMI 72.05 kg/m2  BMI= Body mass index is 72.05 kg/(m^2).         [place] Total Score " 3/14/2017   Total score - [place] 13       GENERAL APPEARANCE: alert and no distress     ASSESSMENT AND PLAN:  It is my impression that Ms. Salmeron is doing an excellent job of picking up all hours of use where she thinks that she may intend to fall asleep.  She does have an [place] sleepiness score today of 13/24, but she states this is much better as she is able to keep alertness when she is out in public and she and her daughter are quite proud of this and the following plan of care has been developed:     1.  Obstructive sleep apnea, severe.  The patient does show some large leak when she uses the restroom.  She will remove her whole head gear.  I recommended she consider just taking the hose off of her interface and then eliminating some adjusting that she has to do at night.  She feels well with her apnea covered and getting more sleep on her therapy.   2.  Obesity.  Continues to struggle with weight loss.  We did discuss options such as pool therapy which may be good for her joints.  She is extending her time walking which is always good, but her BMI still is quite elevated.   3.  Sleep habits.  At this point, I probably would not change much.  It is obvious from her download that her regular bedtime during the week is somewhere around 11:00, wakeup time is usually around 7:00-8:00 in the morning.  She does stay up later and sleep in on weekends, has some residual daytime sleepiness, and her alertness may be a bit better if her circadian rhythm was more aligned.  Will update her on this and see if she's interested in re-alligning her circadian rhythm.     She will see me in 1 year's time or sooner with any problems.      Time spent with patient 25 minutes, of which greater than 50% was spent in counseling, education and coordination of care.         YOCASTA GARCIA, CNP             D: 03/14/2017 17:26   T: 03/14/2017 20:04   MT: SPR[place]      Name:     BRAXTON SALMERON   MRN:      [id number]        Account:       [id number]   :      1975           Visit Date:   2017      Document: [id number]

## 2017-03-23 ENCOUNTER — HOSPITAL ENCOUNTER (OUTPATIENT)
Dept: CARDIAC REHAB | Facility: CLINIC | Age: 42
End: 2017-03-23
Attending: INTERNAL MEDICINE
Payer: MEDICARE

## 2017-03-23 VITALS — HEIGHT: 67 IN | WEIGHT: 293 LBS | BODY MASS INDEX: 45.99 KG/M2

## 2017-03-23 LAB — GLUCOSE BLDC GLUCOMTR-MCNC: 285 MG/DL (ref 70–99)

## 2017-03-23 PROCEDURE — 40000116 ZZH STATISTIC OP CR VISIT: Performed by: REHABILITATION PRACTITIONER

## 2017-03-23 PROCEDURE — 00000146 ZZHCL STATISTIC GLUCOSE BY METER IP

## 2017-03-23 PROCEDURE — 93797 PHYS/QHP OP CAR RHAB WO ECG: CPT | Performed by: REHABILITATION PRACTITIONER

## 2017-03-23 PROCEDURE — 40000575 ZZH STATISTIC OP CARDIAC VISIT #2: Performed by: REHABILITATION PRACTITIONER

## 2017-03-23 PROCEDURE — 93798 PHYS/QHP OP CAR RHAB W/ECG: CPT | Performed by: REHABILITATION PRACTITIONER

## 2017-03-23 ASSESSMENT — 6 MINUTE WALK TEST (6MWT)
PREDICTED: 1302.7
MALE CALC: 1294.8
TOTAL DISTANCE WALKED (FT): 132
GENDER SELECTION: FEMALE
FEMALE CALC: 976.53

## 2017-03-23 NOTE — PROGRESS NOTES
03/23/17 1100   Session   Session Initial Evaluation and Exercise Prescription   Certified through this date 04/21/17   Cardiac Rehab Assessment   Cardiac Rehab Assessment Pt has dialated cardiomyopathy with diastolic disfunction. She has diabetes, Graves disease, JYOTI, and is morbidly obese. She demonstrate signif. deconditioning, unable to walk more that 15-30 seconds behind w/c without rest breaks, she walked a total of 3 min. Pt is somewhat concerned about participating in group due to her obesity, but is open to it. She is working with the wt. management clinic for about 7 months. Pt is motivated to get started with regular exercise. The patient's history and clinical status including hemodynamics and ECG were evaluated.  The patient was assessed to be stable and appropriate to begin exercise.   The patient's functional capacity and exercise prescription were determined by the completion of the 6 minute walk test.  See results below.  The patient was oriented to the program.  Risk factor profile was completed. Goals and objectives were discussed. CV response was WNL. No symptoms, complaints or pain were reported. Good prognosis for reaching above goals. Skilled therapy is necessary in order to monitor CV response to exercise, adapt intermittent exercise, will initiate with non-wt bearing ex. Due to her extreme dyspnea and her back pain. CR  to provide education on risk factors and behavior change counseling for smoking cessation and diet changes needed to achieve patient's goals.  Plan to progress to 30-40 minutes of exercise prior to discharge from cardiac rehab.  Initial THR of 20-30 beats above RHR; Effort rating of 4-6.  Initiate muscle conditioning as appropriate.  Provide risk factor education and behavior change counseling.    General Information   Treatment Diagnosis Systolic Heart Failure with Preserved EF   Date of Treatment Diagnosis 10/13/16   Secondary Treatment Diagnosis Diastolic Heart Failure  "  Significant Past CV History History of Heart Failure;Chronic AF   Comorbidities DM   Other Medical History JYOTI, Cellulitis, neuropathic pain, Asthma, Hypthyroidism, morbid obesity, diabetes, 2013 pulm embolism.    Lead up symptoms dyspnea, unable to lay flat, exhaustion, palpitation   Hospital Location [facility]   Hospital Discharge Date 10/20/16   Signs and Symptoms Post Hospital Discharge Palpitations;Fatigue;SOB;Lightheadedness;Anxiety   Comments report having heart failur diagnosis for about 1 year   Outpatient Cardiac Rehab Start Date 03/23/17   Primary Physician Jamilah Bernal Kristi Kopec (wt mgnt clinic)   Primary Physician Follow Up Scheduled  (with  mgnt clinic, )   Cardiologist Percy Alcala   Ejection Fraction 35%   Risk Stratification High   Living and Work Status    Living Arrangements and Social Status apartment;alone   Support System Check-in help as needed   Return to Employment No   Occupation customer service, telemarketing   Preventative Medications   CMS recommended medications Anticoagulants;Beta Blocker;Pneumonia vaccination   Falls Screen   Have you fallen two or more times in the past year? Yes   Have you fallen and had an injury in the past year? No   Referral Initiated to Physical Therapy Patient recently attended Physical Therapy   Pain   Patient Currently in Pain Yes   Pain Location back    Pain Rating 6   Pain Description Heaviness;Ache   Pain Description Comment sitting or laying down   Pain Treatment Recommendations pain med.    Physical Assessments   Incisions Not applicable   Edema +1 Trace   Limitations Orthopedic   Comments Back pain is chronic. Has rt foot pain from injury but appears under control   Individualized Treatment Plan   Monitored Sessions Scheduled 24   Monitored Sessions Attended 1   Oxygen   Supplemental Oxygen needed No   Nutrition Management - Weight Management   Assessment Initial Assessment   Age 42   Weight (!) 209.1 kg (461 lb)   Height 1.702 m (5' 7.01\") "   BMI (Calculated) 72.34   Goal Weight 113.4 kg (250 lb)   Initial Rate Your Plate Score. Dietary tool to assess eating patterns. Scores range from 24 to 72. The higher the score the healthier the eating pattern. 45   Nutrition Management - Diabetes   Diabetes Type II   Do you Monitor BS at Home? Yes   Diabetes Medication Prescribed Yes, Insulin   Hb A1C Result: 8.8   Diabetes Comments diagnosed in 2015   Nutrition Management Summary   Dietary Recommendations Low Fat;Low Cholesterol;Low Sodium;Diabetic  (using meal replacement)   Stages of Change for Diet Compliance Action   Patient Goals Goal #1   Goal #1 Description Pt would like to loose 1-2# per week, by using my fitness pal, incr. act and start reg  exercise   Psychosocial Management   Psychosocial Assessment Initial   Is there history of clinical depression or increased risk of depression? History of clinical depression  (on anti depressant medication)   Current Level of Stress per Patient Report Mild   Current Coping Skills Uses Stress Management/Relaxation Techniques;Has Positive Support System;Has Negative Coping Skills  (music, social media, TV)   Initial Patient Health Questionnaire -9 Score (PHQ-9) for depression. 5-9 Minimal symptoms, 10-14 Minor depression, 15-19 Major depression, moderately severe, > 20 Major depression, severe  6   Initial [facility] Survey score.  Quality of Life:   If total score > 25 review individual areas where patient rated a 4 or 5.  Consider patients current medical condition and what role that plays on the score.   Adjust treatment protocol to improve areas of concern.  Consider the following:  PHQ9 score, DASI, and re-assessment within the next 30 days to assist with developing treatments.  33   Stages of Change Maintenance   Patient Goal Yes   Goal Description Pt would like to find positive replacement for smoking and eating for stress mgnt.    Psychosocial Comments main source of stress comes from disablity due to  obesity   Other Core Components - Hypertension   History of or Diagnosis of Hypertension Yes   Currently taking Anti-Hypertensives Beta blocker;Nitrates   Other Core Components - Tobacco   History of Tobacco Use Yes   Tobacco Use Status Currently smoking - everyday   Tobacco Habit Cigarettes   Tobacco Use per Day (average) 5-10 per day   Years of Tobacco Use 20   Stages of Change Preparation   Other Core Components Summary   Interventions Planned None   Patient Goals Yes   Goal #1 Description Pt would like to set a quit date for smoking by developing resources and plan for replacement.    Goal #1 Target Date 05/18/17   Goal #1 Progress Towards Goal 3/23 Give Quit plan contact information   Activity/Exercise History   Activity/Exercise Assessment Initial   Activity/Exercise Status prior to event? Sedentary   Number of Days Currently participating in Moderate Physical Activity? 0   Number of Days Currently performing  Aerobic Exercise (including rehab)? 0   Number of Minutes per Session Currently of Aerobic Exercise (average)? 0   Current Stage of Change (Physical Activity) Preparation   Current Stage of Change (Aerobic Exercise) Preparation   Patient Goals Goal #1;Goal #2   Goal #1 Description Pt would like to improve fitness to be able to walk for 20 min with one rest break, with stable cv response.    Goal #1 Target Date 05/18/17   Goal #2 Description Establish indep exercise program and doing ex on own 2-3 days per week.    Goal #2 Target Date 05/18/17   Exercise Assessment   6 Minute Walk Predicted - Gender Selection Female   6 Minute Walk Predicted (Male) 1294.8   6 Minute Walk Predicted (Female) 976.53   Initial 6 Minute Walk Distance (Feet) 132 ft   Resting HR 85 bpm   Exercise  bpm   Post Exercise HR 90 bpm   Resting /78   Exercise /76   Post Exercise /76   Pre  mg/dL   Effort Rating 9   Current MET Level 1.2   MET Level Goal 3   ECG Rhythm Sinus rhythm   Ectopy None   Current  Symptoms Dyspnea;Fatigue;Joint pain   Limitations/Restrictions Orthopedic (see comments)  (chronic back pain)   Exercise Prescription   Mode Arm Ergometer;Weights  (scifit)   Duration/Time Intermittent bouts   Frequency 3 daysweek   THR (85% of age predicted max HR) 150.45   OMNI Effort Rating (0-10 Scale) 4-6/10   Progression Intermittent bouts;Aerobic exercise to OMNI rating of 5-7, and heart rate at or below target;Other (see comments)   Recommended Home Exercise   Type of Exercise Low Impact Calisthenics   Frequency (days per week) daily on off days from rehab   Duration (minutes per session) Intermittent   Effort Rating Recommended 4-6/10   30 Day Exercise Plan initiate low level calisthenics and /or walking, 2 min bouts   Current Home Exercise   Type of Exercise None   Follow-up/On-going Support   Provider follow-up needed on the following No follow-up needed   Learning Assessment   Learner Patient   Primary Language English   Preferred Learning Style Listening;Reading;Demonstration;Pictures/Video   Barriers to Learning No barriers noted   Patient Education   Education recommended Anatomy and Physiology of the Heart;Blood Pressure;Diabetes;Exercise Principles;Heart Failure;Medication Overview;Muscle Conditioning;Nutrition;Smoking Cessation;Stress Management;Weight Loss   Physician cosignature/electronic signature indicates approval of this ITP document. I have established, reviewed and made necessary changes to the individualized treatment plan and exercise prescription for this patient.

## 2017-03-29 ENCOUNTER — HOSPITAL ENCOUNTER (OUTPATIENT)
Dept: CARDIAC REHAB | Facility: CLINIC | Age: 42
End: 2017-03-29
Attending: INTERNAL MEDICINE
Payer: MEDICARE

## 2017-03-29 PROCEDURE — 93798 PHYS/QHP OP CAR RHAB W/ECG: CPT | Performed by: REHABILITATION PRACTITIONER

## 2017-03-29 PROCEDURE — 40000116 ZZH STATISTIC OP CR VISIT: Performed by: REHABILITATION PRACTITIONER

## 2017-04-06 ENCOUNTER — APPOINTMENT (OUTPATIENT)
Dept: GENERAL RADIOLOGY | Facility: CLINIC | Age: 42
DRG: 554 | End: 2017-04-06
Attending: EMERGENCY MEDICINE
Payer: MEDICARE

## 2017-04-06 ENCOUNTER — HOSPITAL ENCOUNTER (INPATIENT)
Facility: CLINIC | Age: 42
LOS: 2 days | Discharge: HOME | DRG: 554 | End: 2017-04-08
Attending: EMERGENCY MEDICINE | Admitting: INTERNAL MEDICINE
Payer: MEDICARE

## 2017-04-06 ENCOUNTER — APPOINTMENT (OUTPATIENT)
Dept: ULTRASOUND IMAGING | Facility: CLINIC | Age: 42
DRG: 554 | End: 2017-04-06
Attending: EMERGENCY MEDICINE
Payer: MEDICARE

## 2017-04-06 DIAGNOSIS — Z79.01 LONG-TERM (CURRENT) USE OF ANTICOAGULANTS: ICD-10-CM

## 2017-04-06 DIAGNOSIS — G47.00 INSOMNIA, UNSPECIFIED TYPE: Primary | ICD-10-CM

## 2017-04-06 DIAGNOSIS — M79.671 RIGHT FOOT PAIN: ICD-10-CM

## 2017-04-06 DIAGNOSIS — M79.2 NEUROPATHIC PAIN OF LOWER EXTREMITY, UNSPECIFIED LATERALITY: ICD-10-CM

## 2017-04-06 DIAGNOSIS — R70.0 ELEVATED SED RATE: ICD-10-CM

## 2017-04-06 DIAGNOSIS — E11.9 DIABETES MELLITUS, TYPE 2 (H): ICD-10-CM

## 2017-04-06 DIAGNOSIS — I48.19 PERSISTENT ATRIAL FIBRILLATION (H): ICD-10-CM

## 2017-04-06 DIAGNOSIS — M79.89 RIGHT LEG SWELLING: ICD-10-CM

## 2017-04-06 DIAGNOSIS — M79.672 LEFT FOOT PAIN: ICD-10-CM

## 2017-04-06 DIAGNOSIS — R79.82 ELEVATED C-REACTIVE PROTEIN (CRP): ICD-10-CM

## 2017-04-06 PROBLEM — M79.673 FOOT PAIN: Status: ACTIVE | Noted: 2017-04-06

## 2017-04-06 LAB
ANION GAP SERPL CALCULATED.3IONS-SCNC: 6 MMOL/L (ref 3–14)
BASOPHILS # BLD AUTO: 0 10E9/L (ref 0–0.2)
BASOPHILS NFR BLD AUTO: 0.2 %
BUN SERPL-MCNC: 12 MG/DL (ref 7–30)
CALCIUM SERPL-MCNC: 8.9 MG/DL (ref 8.5–10.1)
CHLORIDE SERPL-SCNC: 107 MMOL/L (ref 94–109)
CO2 SERPL-SCNC: 28 MMOL/L (ref 20–32)
CREAT SERPL-MCNC: 0.92 MG/DL (ref 0.52–1.04)
CRP SERPL-MCNC: 84 MG/L (ref 0–8)
DIFFERENTIAL METHOD BLD: ABNORMAL
EOSINOPHIL # BLD AUTO: 0.2 10E9/L (ref 0–0.7)
EOSINOPHIL NFR BLD AUTO: 1.7 %
ERYTHROCYTE [DISTWIDTH] IN BLOOD BY AUTOMATED COUNT: 14.4 % (ref 10–15)
ERYTHROCYTE [SEDIMENTATION RATE] IN BLOOD BY WESTERGREN METHOD: 60 MM/H (ref 0–20)
GFR SERPL CREATININE-BSD FRML MDRD: 67 ML/MIN/1.7M2
GLUCOSE BLDC GLUCOMTR-MCNC: 122 MG/DL (ref 70–99)
GLUCOSE SERPL-MCNC: 185 MG/DL (ref 70–99)
HCT VFR BLD AUTO: 37.2 % (ref 35–47)
HGB BLD-MCNC: 12.2 G/DL (ref 11.7–15.7)
IMM GRANULOCYTES # BLD: 0.1 10E9/L (ref 0–0.4)
IMM GRANULOCYTES NFR BLD: 0.5 %
INR PPP: 2.94 (ref 0.86–1.14)
LYMPHOCYTES # BLD AUTO: 3.1 10E9/L (ref 0.8–5.3)
LYMPHOCYTES NFR BLD AUTO: 25.8 %
MCH RBC QN AUTO: 29.9 PG (ref 26.5–33)
MCHC RBC AUTO-ENTMCNC: 32.8 G/DL (ref 31.5–36.5)
MCV RBC AUTO: 91 FL (ref 78–100)
MONOCYTES # BLD AUTO: 0.6 10E9/L (ref 0–1.3)
MONOCYTES NFR BLD AUTO: 4.7 %
NEUTROPHILS # BLD AUTO: 8.1 10E9/L (ref 1.6–8.3)
NEUTROPHILS NFR BLD AUTO: 67.1 %
NRBC # BLD AUTO: 0 10*3/UL
NRBC BLD AUTO-RTO: 0 /100
PLATELET # BLD AUTO: 337 10E9/L (ref 150–450)
POTASSIUM SERPL-SCNC: 3.9 MMOL/L (ref 3.4–5.3)
PROCALCITONIN SERPL-MCNC: NORMAL NG/ML
RBC # BLD AUTO: 4.08 10E12/L (ref 3.8–5.2)
SODIUM SERPL-SCNC: 141 MMOL/L (ref 133–144)
URATE SERPL-MCNC: 7.4 MG/DL (ref 2.6–6)
WBC # BLD AUTO: 12.1 10E9/L (ref 4–11)

## 2017-04-06 PROCEDURE — 84145 PROCALCITONIN (PCT): CPT | Performed by: EMERGENCY MEDICINE

## 2017-04-06 PROCEDURE — 25000132 ZZH RX MED GY IP 250 OP 250 PS 637: Mod: GY | Performed by: INTERNAL MEDICINE

## 2017-04-06 PROCEDURE — A9270 NON-COVERED ITEM OR SERVICE: HCPCS | Mod: GY | Performed by: STUDENT IN AN ORGANIZED HEALTH CARE EDUCATION/TRAINING PROGRAM

## 2017-04-06 PROCEDURE — 93971 EXTREMITY STUDY: CPT | Mod: RT

## 2017-04-06 PROCEDURE — A9270 NON-COVERED ITEM OR SERVICE: HCPCS | Mod: GY | Performed by: EMERGENCY MEDICINE

## 2017-04-06 PROCEDURE — 85025 COMPLETE CBC W/AUTO DIFF WBC: CPT | Performed by: EMERGENCY MEDICINE

## 2017-04-06 PROCEDURE — 99285 EMERGENCY DEPT VISIT HI MDM: CPT | Mod: 25 | Performed by: EMERGENCY MEDICINE

## 2017-04-06 PROCEDURE — 84550 ASSAY OF BLOOD/URIC ACID: CPT | Performed by: EMERGENCY MEDICINE

## 2017-04-06 PROCEDURE — 86140 C-REACTIVE PROTEIN: CPT | Performed by: EMERGENCY MEDICINE

## 2017-04-06 PROCEDURE — 25000132 ZZH RX MED GY IP 250 OP 250 PS 637: Mod: GY | Performed by: EMERGENCY MEDICINE

## 2017-04-06 PROCEDURE — 99285 EMERGENCY DEPT VISIT HI MDM: CPT | Mod: Z6 | Performed by: EMERGENCY MEDICINE

## 2017-04-06 PROCEDURE — 85652 RBC SED RATE AUTOMATED: CPT | Performed by: EMERGENCY MEDICINE

## 2017-04-06 PROCEDURE — 12000001 ZZH R&B MED SURG/OB UMMC

## 2017-04-06 PROCEDURE — 00000146 ZZHCL STATISTIC GLUCOSE BY METER IP

## 2017-04-06 PROCEDURE — 73630 X-RAY EXAM OF FOOT: CPT | Mod: RT

## 2017-04-06 PROCEDURE — 85610 PROTHROMBIN TIME: CPT | Performed by: EMERGENCY MEDICINE

## 2017-04-06 PROCEDURE — 80048 BASIC METABOLIC PNL TOTAL CA: CPT | Performed by: EMERGENCY MEDICINE

## 2017-04-06 PROCEDURE — 25000132 ZZH RX MED GY IP 250 OP 250 PS 637: Mod: GY | Performed by: STUDENT IN AN ORGANIZED HEALTH CARE EDUCATION/TRAINING PROGRAM

## 2017-04-06 PROCEDURE — A9270 NON-COVERED ITEM OR SERVICE: HCPCS | Mod: GY | Performed by: INTERNAL MEDICINE

## 2017-04-06 RX ORDER — CAPSAICIN 0.025 %
1 CREAM (GRAM) TOPICAL 3 TIMES DAILY PRN
Status: DISCONTINUED | OUTPATIENT
Start: 2017-04-06 | End: 2017-04-08 | Stop reason: HOSPADM

## 2017-04-06 RX ORDER — ONDANSETRON 2 MG/ML
4 INJECTION INTRAMUSCULAR; INTRAVENOUS EVERY 6 HOURS PRN
Status: DISCONTINUED | OUTPATIENT
Start: 2017-04-06 | End: 2017-04-08 | Stop reason: HOSPADM

## 2017-04-06 RX ORDER — LIDOCAINE 40 MG/G
CREAM TOPICAL
Status: DISCONTINUED | OUTPATIENT
Start: 2017-04-06 | End: 2017-04-08 | Stop reason: HOSPADM

## 2017-04-06 RX ORDER — BUPROPION HYDROCHLORIDE 100 MG/1
100 TABLET, EXTENDED RELEASE ORAL 2 TIMES DAILY
Status: DISCONTINUED | OUTPATIENT
Start: 2017-04-06 | End: 2017-04-08 | Stop reason: HOSPADM

## 2017-04-06 RX ORDER — METOPROLOL SUCCINATE 25 MG/1
25 TABLET, EXTENDED RELEASE ORAL AT BEDTIME
Status: DISCONTINUED | OUTPATIENT
Start: 2017-04-06 | End: 2017-04-08 | Stop reason: HOSPADM

## 2017-04-06 RX ORDER — OXYCODONE HYDROCHLORIDE 5 MG/1
10-15 TABLET ORAL
Status: DISCONTINUED | OUTPATIENT
Start: 2017-04-06 | End: 2017-04-08 | Stop reason: HOSPADM

## 2017-04-06 RX ORDER — HYDRALAZINE HYDROCHLORIDE 25 MG/1
25 TABLET, FILM COATED ORAL 4 TIMES DAILY
Status: DISCONTINUED | OUTPATIENT
Start: 2017-04-07 | End: 2017-04-08 | Stop reason: HOSPADM

## 2017-04-06 RX ORDER — TORSEMIDE 100 MG/1
100 TABLET ORAL 2 TIMES DAILY
Status: DISCONTINUED | OUTPATIENT
Start: 2017-04-06 | End: 2017-04-08 | Stop reason: HOSPADM

## 2017-04-06 RX ORDER — SENNOSIDES 8.6 MG
1 TABLET ORAL 2 TIMES DAILY
Status: DISCONTINUED | OUTPATIENT
Start: 2017-04-06 | End: 2017-04-08 | Stop reason: HOSPADM

## 2017-04-06 RX ORDER — BISACODYL 10 MG
10 SUPPOSITORY, RECTAL RECTAL DAILY PRN
Status: DISCONTINUED | OUTPATIENT
Start: 2017-04-06 | End: 2017-04-08 | Stop reason: HOSPADM

## 2017-04-06 RX ORDER — NALOXONE HYDROCHLORIDE 0.4 MG/ML
.1-.4 INJECTION, SOLUTION INTRAMUSCULAR; INTRAVENOUS; SUBCUTANEOUS
Status: DISCONTINUED | OUTPATIENT
Start: 2017-04-06 | End: 2017-04-08 | Stop reason: HOSPADM

## 2017-04-06 RX ORDER — ALBUTEROL SULFATE 90 UG/1
2 AEROSOL, METERED RESPIRATORY (INHALATION) EVERY 6 HOURS PRN
Status: DISCONTINUED | OUTPATIENT
Start: 2017-04-06 | End: 2017-04-08 | Stop reason: HOSPADM

## 2017-04-06 RX ORDER — POTASSIUM CHLORIDE 750 MG/1
40 TABLET, EXTENDED RELEASE ORAL 4 TIMES DAILY
Status: DISCONTINUED | OUTPATIENT
Start: 2017-04-07 | End: 2017-04-08 | Stop reason: HOSPADM

## 2017-04-06 RX ORDER — NICOTINE 21 MG/24HR
1 PATCH, TRANSDERMAL 24 HOURS TRANSDERMAL DAILY
Status: DISCONTINUED | OUTPATIENT
Start: 2017-04-06 | End: 2017-04-08 | Stop reason: HOSPADM

## 2017-04-06 RX ORDER — GABAPENTIN 300 MG/1
600 CAPSULE ORAL 2 TIMES DAILY
Status: DISCONTINUED | OUTPATIENT
Start: 2017-04-06 | End: 2017-04-08 | Stop reason: HOSPADM

## 2017-04-06 RX ORDER — WARFARIN SODIUM 5 MG/1
10 TABLET ORAL
Status: COMPLETED | OUTPATIENT
Start: 2017-04-06 | End: 2017-04-06

## 2017-04-06 RX ORDER — TOPIRAMATE 25 MG/1
75 TABLET, FILM COATED ORAL 2 TIMES DAILY
Status: DISCONTINUED | OUTPATIENT
Start: 2017-04-06 | End: 2017-04-08 | Stop reason: HOSPADM

## 2017-04-06 RX ORDER — OXYCODONE HYDROCHLORIDE 5 MG/1
10 TABLET ORAL ONCE
Status: COMPLETED | OUTPATIENT
Start: 2017-04-06 | End: 2017-04-06

## 2017-04-06 RX ORDER — DULOXETIN HYDROCHLORIDE 20 MG/1
20 CAPSULE, DELAYED RELEASE ORAL 2 TIMES DAILY
Status: DISCONTINUED | OUTPATIENT
Start: 2017-04-06 | End: 2017-04-08 | Stop reason: HOSPADM

## 2017-04-06 RX ORDER — POLYETHYLENE GLYCOL 3350 17 G/17G
17 POWDER, FOR SOLUTION ORAL DAILY
Status: DISCONTINUED | OUTPATIENT
Start: 2017-04-07 | End: 2017-04-08 | Stop reason: HOSPADM

## 2017-04-06 RX ORDER — LEVOTHYROXINE SODIUM 200 UG/1
200 TABLET ORAL DAILY
Status: DISCONTINUED | OUTPATIENT
Start: 2017-04-07 | End: 2017-04-08 | Stop reason: HOSPADM

## 2017-04-06 RX ORDER — SPIRONOLACTONE 25 MG/1
50 TABLET ORAL DAILY
Status: DISCONTINUED | OUTPATIENT
Start: 2017-04-07 | End: 2017-04-08 | Stop reason: HOSPADM

## 2017-04-06 RX ORDER — ONDANSETRON 4 MG/1
4 TABLET, ORALLY DISINTEGRATING ORAL EVERY 6 HOURS PRN
Status: DISCONTINUED | OUTPATIENT
Start: 2017-04-06 | End: 2017-04-08 | Stop reason: HOSPADM

## 2017-04-06 RX ORDER — NICOTINE POLACRILEX 4 MG
15-30 LOZENGE BUCCAL
Status: DISCONTINUED | OUTPATIENT
Start: 2017-04-06 | End: 2017-04-08 | Stop reason: HOSPADM

## 2017-04-06 RX ORDER — LIRAGLUTIDE 6 MG/ML
1.8 INJECTION SUBCUTANEOUS DAILY
Status: DISCONTINUED | OUTPATIENT
Start: 2017-04-07 | End: 2017-04-08 | Stop reason: HOSPADM

## 2017-04-06 RX ORDER — ACETAMINOPHEN 325 MG/1
650 TABLET ORAL 3 TIMES DAILY PRN
Status: DISCONTINUED | OUTPATIENT
Start: 2017-04-06 | End: 2017-04-08 | Stop reason: HOSPADM

## 2017-04-06 RX ORDER — WARFARIN SODIUM 7.5 MG/1
15 TABLET ORAL
Status: DISCONTINUED | OUTPATIENT
Start: 2017-04-06 | End: 2017-04-06

## 2017-04-06 RX ORDER — DEXTROSE MONOHYDRATE 25 G/50ML
25-50 INJECTION, SOLUTION INTRAVENOUS
Status: DISCONTINUED | OUTPATIENT
Start: 2017-04-06 | End: 2017-04-08 | Stop reason: HOSPADM

## 2017-04-06 RX ADMIN — DULOXETINE 20 MG: 20 CAPSULE, DELAYED RELEASE PELLETS ORAL at 23:45

## 2017-04-06 RX ADMIN — RANITIDINE HYDROCHLORIDE 150 MG: 150 TABLET, FILM COATED ORAL at 23:46

## 2017-04-06 RX ADMIN — OXYCODONE HYDROCHLORIDE 10 MG: 5 TABLET ORAL at 21:27

## 2017-04-06 RX ADMIN — SENNOSIDES 1 TABLET: 8.6 TABLET, FILM COATED ORAL at 23:45

## 2017-04-06 RX ADMIN — BUPROPION HYDROCHLORIDE 100 MG: 100 TABLET, FILM COATED, EXTENDED RELEASE ORAL at 23:46

## 2017-04-06 RX ADMIN — WARFARIN SODIUM 10 MG: 5 TABLET ORAL at 23:46

## 2017-04-06 RX ADMIN — GABAPENTIN 600 MG: 300 CAPSULE ORAL at 23:45

## 2017-04-06 RX ADMIN — TOPIRAMATE 75 MG: 25 TABLET, FILM COATED ORAL at 23:46

## 2017-04-06 ASSESSMENT — ACTIVITIES OF DAILY LIVING (ADL)
WHICH_OF_THE_ABOVE_FUNCTIONAL_RISKS_HAD_A_RECENT_ONSET_OR_CHANGE?: AMBULATION
RETIRED_COMMUNICATION: 0-->UNDERSTANDS/COMMUNICATES WITHOUT DIFFICULTY
BATHING: 2-->ASSISTIVE PERSON
TRANSFERRING: 0-->INDEPENDENT
FALL_HISTORY_WITHIN_LAST_SIX_MONTHS: NO
TOILETING: 0-->INDEPENDENT
AMBULATION: 0-->INDEPENDENT
SWALLOWING: 0-->SWALLOWS FOODS/LIQUIDS WITHOUT DIFFICULTY
COGNITION: 0 - NO COGNITION ISSUES REPORTED
RETIRED_EATING: 0-->INDEPENDENT
DRESS: 2-->ASSISTIVE PERSON

## 2017-04-06 ASSESSMENT — ENCOUNTER SYMPTOMS
WOUND: 0
CHILLS: 0
SHORTNESS OF BREATH: 0
FEVER: 0

## 2017-04-06 NOTE — IP AVS SNAPSHOT
MRN:[id number]                      After Visit Summary   4/6/2017    Pricilla Brown    MRN: [id number]           Thank you!     Thank you for choosing [facility] for your care. Our goal is always to provide you with excellent care. Hearing back from our patients is one way we can continue to improve our services. Please take a few minutes to complete the written survey that you may receive in the mail after you visit with us. Thank you!        Patient Information     Date Of Birth          1975        Designated Caregiver       Most Recent Value    Caregiver    Will someone help with your care after discharge? no      About your hospital stay     You were admitted on:  April 6, 2017 You last received care in the:  [facility] 8A    You were discharged on:  April 8, 2017        Reason for your hospital stay       You were admitted with right foot/ankle pain and swelling. You were seen by the podiatry team and a wrap was applied. You will continue with the wrap until you seen them on Monday outpatient.                  Who to Call     For medical emergencies, please call 911.  For non-urgent questions about your medical care, please call your primary care provider or clinic, [phone]          Attending Provider     Provider Specialty    Juan Baldwin MD Emergency Medicine    Nathan Ellis MD Internal Medicine       Primary Care Provider Office Phone # Fax #    Jamilah Bernal [phone]       [address] [address]         When to contact your care team       Call or return if you develop fever >101, increased pain/swelling of the foot/ankle, new onset redness or tenderness of the foot/ankle, confusion, altered mental status, or any other symptoms of concern to you.                  After Care Instructions     Activity       Your activity upon discharge: activity as tolerated            Diet       Follow this diet upon discharge:  Orders Placed This Encounter      Combination Diet 4951-4349 Calories: Moderate Consistent CHO (4-6 CHO units/meal)                  Follow-up Appointments     Adult [facility] Follow-up and recommended labs and tests       Follow up with PCP within 3-5 days for hospital follow up.    Follow up with podiatry on Monday, April 10. Call them at [phone] to schedule.    Follow up with Coumadin clinic on 4/10    Appointments on [facility] and/or [facility] (with [facility] or [facility] provider or service). Call [phone] if you haven't heard regarding these appointments within 7 days of discharge.                  Your next 10 appointments already scheduled     Apr 10, 2017  3:00 PM CDT   Treatment 60 with Ur Cardiac Rehab 1   [facility] [place], Cardiac Rehabilitation ([facility])    [address] [place] [address] [address] [address]   [phone]            Apr 12, 2017  3:00 PM CDT   Treatment 60 with Ur Cardiac Rehab 1   [facility], Cardiac Rehabilitation ([facility])    [address] [place] [address] [address]st [address] [address]   [phone]            Apr 14, 2017  3:00 PM CDT   Treatment 60 with Ur Cardiac Rehab 1   [facility], Cardiac Rehabilitation ([facility])    [address] [place] [address] [address]st [address] [address]   [phone]            Apr 17, 2017 11:30 AM CDT   NUTRITION VISIT with Paulette Ruiz RD   [facility] Surgical Weight Management ([facility])    [address] [facility]  4th Floor  [facility] [address]   [phone]            Apr 17, 2017  3:00 PM CDT   Treatment 60 with Ur Cardiac Rehab 1   [facility], Cardiac Rehabilitation ([facility])    [address] [place] [address]  [address]  [facility] [address]   [phone]            Apr 19, 2017  3:00 PM CDT   Treatment 60 with Ur Cardiac Rehab 1   [facility], Cardiac Rehabilitation ([facility])    [address] [place] [address] 1st [address]  [facility] [address]   [phone]            Apr 21, 2017  2:30 PM CDT   Consult HOD with Ur Cardiac Rehab 1   [facility], Cardiac Rehabilitation ([facility])    [address] [place] [address] [address]st [address] [address]   [phone]            Apr 21, 2017  3:00 PM CDT   Treatment 60 with Ur Cardiac Rehab 1   [facility], Cardiac Rehabilitation ([facility])    [address] [place] [address] [address]st [address] [address]   [phone]            Apr 24, 2017  3:00 PM CDT   Treatment 60 with Ur Cardiac Rehab 1   [facility], Cardiac Rehabilitation ([facility])    [address] [place] [address] [address]st [address] [address]   [phone]            Apr 26, 2017  3:00 PM CDT   Treatment 60 with Ur Cardiac Rehab 1   [facility], Cardiac Rehabilitation ([facility])    [address] [place] [address] [address]st [address] [address]   [phone]              Warfarin Instruction     You have started taking a medicine called warfarin. This is a blood-thinning medicine (anticoagulant). It helps prevent and treat blood clots.      Before leaving the hospital, make sure you know how much to take and how long to take it.      You will need regular blood tests to make sure your blood is clotting safely. It is very important to see your doctor for regular blood tests.    Talk to your doctor before taking any new medicine (this includes over-the-counter drugs and herbal products).  "Many medicines can interact with warfarin. This may cause more bleeding or too much clotting.     Eating a lot of vitamin K--found in green, leafy vegetables--can change the way warfarin works in your body. Do NOT avoid these foods. Instead, try to eat the same amount each day.     Bleeding is the most common side-effect of warfarin. You may notice bleeding gums, a bloody nose, bruises and bleeding longer when you cut yourself. See a doctor at once if:   o You cough up blood  o You find blood in your stool (poop)  o You have a deep cut, or a cut that bleeds longer than 10 minutes   o You have a bad cut, hard fall, accident or hit your head (go to urgent care or the emergency room).    For women who can get pregnant: This medicine can harm an unborn baby. Be very careful not to get pregnant while taking this medicine. If you think you might be pregnant, call your doctor right away.    For more information, read \"Guide to Warfarin Therapy,  the booklet you received in the hospital.        Pending Results     No orders found from 4/4/2017 to 4/7/2017.            Statement of Approval     Ordered          04/08/17 1110  I have reviewed and agree with all the recommendations and orders detailed in this document.  EFFECTIVE NOW     Approved and electronically signed by:  Nina Holbrook PA-C             Admission Information     Date & Time Provider Department Dept. Phone    4/6/2017 Nathan Ellis MD UR 8A [phone]      Your Vitals Were     Blood Pressure Pulse Temperature Respirations Weight Pulse Oximetry    146/54 (BP Location: Right arm) 81 98.1  F (36.7  C) (Oral) 18 219.5 kg (484 lb) 97%    BMI (Body Mass Index)                   75.79 kg/m2           MyChart Information     [facility] gives you secure access to your electronic health record. If you see a primary care provider, you can also send messages to your care team and make appointments. If you have questions, please call your primary care " clinic.  If you do not have a primary care provider, please call [phone] and they will assist you.        Care EveryWhere ID     This is your Care EveryWhere ID. This could be used by other organizations to access your [facility] medical records  [id number]           Review of your medicines      CONTINUE these medicines which may have CHANGED, or have new prescriptions. If we are uncertain of the size of tablets/capsules you have at home, strength may be listed as something that might have changed.        Dose / Directions    oxyCODONE 10 MG IR tablet   Commonly known as:  ROXICODONE   This may have changed:    - how much to take  - when to take this   Used for:  Neuropathic pain of lower extremity, unspecified laterality, Left foot pain        Dose:  10-15 mg   Take 1-1.5 tablets (10-15 mg) by mouth every 3 hours as needed for moderate to severe pain   Quantity:  30 tablet   Refills:  0       warfarin 5 MG tablet   Commonly known as:  COUMADIN   This may have changed:    - medication strength  - how much to take  - how to take this  - when to take this  - Another medication with the same name was removed. Continue taking this medication, and follow the directions you see here.   Used for:  Long-term (current) use of anticoagulants        Dose:  10 mg   Take 2 tablets (10 mg) by mouth once for 1 dose Take 15 mg PO daily on 10/20, 10/21, 10/22. Check INR on 10/23. Further dosing per INR level.   Quantity:  30 tablet   Refills:  0         CONTINUE these medicines which have NOT CHANGED        Dose / Directions    acetaminophen 325 MG tablet   Commonly known as:  TYLENOL   Used for:  Neuropathic pain of lower extremity, unspecified laterality        Dose:  650 mg   Take 2 tablets (650 mg) by mouth 3 times daily as needed for mild pain or fever (total acetaminophen dose should not exceed 3000 mg per day)   Quantity:  60 tablet   Refills:  0       albuterol 108 (90 BASE) MCG/ACT Inhaler   Commonly known as:  PROAIR  HFA/PROVENTIL HFA/VENTOLIN HFA   Used for:  Reactive airway disease        Dose:  2 puff   Inhale 2 puffs into the lungs every 6 hours as needed.   Quantity:  1 Inhaler   Refills:  11       bisacodyl 10 MG Suppository   Commonly known as:  DULCOLAX   Used for:  Constipation        Dose:  10 mg   Place 1 suppository (10 mg) rectally daily as needed for constipation   Quantity:  6 suppository   Refills:  0       * blood glucose monitoring lancets   Used for:  Diabetes mellitus, type 2 (H)        Use to test blood sugars 4 times daily or as directed.   Quantity:  1 Box   Refills:  prn       * blood glucose monitoring lancets   Used for:  Type 2 diabetes mellitus with hyperglycemia, with long-term current use of insulin (H)        Use to test blood sugar 4 times daily or as directed.   Quantity:  4 Box   Refills:  2       blood glucose monitoring meter device kit   Used for:  Type II or unspecified type diabetes mellitus without mention of complication, not stated as uncontrolled        Use to test blood sugars 2 times daily or as directed.   Quantity:  1 kit   Refills:  0       * blood glucose monitoring test strip   Commonly known as:  ACCU-CHEK RON   Used for:  Type II or unspecified type diabetes mellitus without mention of complication, not stated as uncontrolled        Use to test blood sugars 2 times daily or as directed.   Quantity:  100 strip   Refills:  11       * blood glucose monitoring test strip   Commonly known as:  ACCU-CHEK SMARTVIEW   Used for:  Type 2 diabetes mellitus with hyperglycemia, with long-term current use of insulin (H)        Use to test blood sugar 5 times daily   Quantity:  450 each   Refills:  3       buPROPion 100 MG 12 hr tablet   Commonly known as:  WELLBUTRIN SR   Used for:  Tobacco abuse        Dose:  100 mg   Take 1 tablet (100 mg) by mouth 2 times daily   Quantity:  60 tablet   Refills:  3       capsaicin 0.025 % Crea cream   Commonly known as:  ZOSTRIX   Used for:   Dermatophytosis of nail        Dose:  1 applicator   Apply 1 g topically 3 times daily as needed   Quantity:  1 Tube   Refills:  0       ciclopirox 8 % Soln   Used for:  Dermatophytosis of nail        Externally apply topically daily To toenails.   Quantity:  1 Bottle   Refills:  11       diazepam 5 MG tablet   Commonly known as:  VALIUM   Used for:  Claustrophobia        Hand carry to procedure on 2/21. Do not take until instructed.   Quantity:  1 tablet   Refills:  0       diclofenac 1 % Gel topical gel   Commonly known as:  VOLTAREN   Used for:  Left foot pain        Dose:  4 g   Place 4 g onto the skin 4 times daily   Quantity:  100 g   Refills:  0       DULoxetine 20 MG EC capsule   Commonly known as:  CYMBALTA   Used for:  Depression        Dose:  20 mg   Take 1 capsule (20 mg) by mouth 2 times daily   Quantity:  60 capsule   Refills:  0       Efinaconazole 10 % Soln   Used for:  Dermatophytosis of nail        Externally apply topically daily To toenails.   Quantity:  8 mL   Refills:  11       gabapentin 300 MG capsule   Commonly known as:  NEURONTIN   Used for:  Lumbago        Dose:  600 mg   Take 2 capsules (600 mg) by mouth 2 times daily   Quantity:  120 capsule   Refills:  5       hydrALAZINE 25 MG tablet   Commonly known as:  APRESOLINE   Used for:  Dilated cardiomyopathy (H)        Take 1 tab (25 mg) in am, 2 tabs (50 mg) midday, and 2 tabs (50 mg) in pm daily   Quantity:  150 tablet   Refills:  3       insulin glargine U-300 300 UNIT/ML injection   Commonly known as:  TOUJEO        Inject 160 units SQ each am.   Quantity:  15 mL   Refills:  3       * insulin pen needle 30G X 8 MM   Commonly known as:  NOVOFINE   Used for:  Type 2 diabetes mellitus with hyperglycemia (H)        Use 2X daily or as directed.   Quantity:  100 each   Refills:  3       * insulin pen needle 32G X 4 MM   Commonly known as:  BD RIVER U/F   Used for:  Diabetes mellitus, type 2 (H)        Use 6 daily or as directed.   Quantity:   "200 each   Refills:  11       insulin syringe-needle U-100 30G X 1/2\" 0.5 ML   Commonly known as:  BD insulin syringe ULTRAFINE   Used for:  Diabetes mellitus, type 2 (H)        Use one syringe 3 daily or as directed.   Quantity:  100 each   Refills:  prn       levothyroxine 200 MCG tablet   Commonly known as:  SYNTHROID/LEVOTHROID   Used for:  Hypothyroidism        Dose:  200 mcg   Take 1 tablet (200 mcg) by mouth daily   Quantity:  90 tablet   Refills:  3       liraglutide 18 MG/3ML soln   Commonly known as:  VICTOZA PEN   Used for:  Diabetes mellitus type 1 (H)        Dose:  1.8 mg   Inject 1.8 mg Subcutaneous daily   Quantity:  27 mL   Refills:  3       metoprolol 50 MG 24 hr tablet   Commonly known as:  TOPROL-XL   Used for:  Chronic diastolic heart failure (H)        Dose:  25 mg   Take 0.5 tablets (25 mg) by mouth At Bedtime   Quantity:  90 tablet   Refills:  3       morphine 15 MG 12 hr tablet   Commonly known as:  MS CONTIN        Dose:  15 mg   Take 15 mg by mouth daily as needed   Refills:  0       Nebulizer Compressor Kit   Used for:  COPD (chronic obstructive pulmonary disease) (H)        Dose:  1 Device   1 Device 4 times daily as needed.   Quantity:  1 kit   Refills:  3       nicotine 14 MG/24HR 24 hr patch   Commonly known as:  [employer] NICOTINE   Used for:  Tobacco abuse        Dose:  1 patch   Place 1 patch onto the skin every 24 hours   Quantity:  30 patch   Refills:  1       NovoLOG FLEXPEN 100 UNIT/ML injection   Generic drug:  insulin aspart        Inject 60 units with meals plus correction. Pt uses approx 180 units in 24 hrs.   Quantity:  60 mL   Refills:  11       nystatin cream   Commonly known as:  MYCOSTATIN   Used for:  Dermatophytosis of nail        Apply topically 2 times daily To toenails   Quantity:  90 g   Refills:  6       order for DME        Use your CPAP device as directed by your provider. Pressure change to min 13 max 18cwp   Quantity:  1 each   Refills:  99       * order for DME "   Used for:  Dilated cardiomyopathy (H), Chronic systolic heart failure (H)        Equipment being ordered: Challenger Wide walker if available - patient needs seat, basket and brakes.   Quantity:  1 each   Refills:  0       * order for DME   Used for:  Dermatophytosis of nail, Plantar fasciitis of right foot, Diabetic neuropathy with neurologic complication (H), Peroneal tendinitis, right        Equipment being ordered: [id number] $92  Plantar, fasciitis, LG, night splint   Quantity:  1 each   Refills:  0       * order for DME   Used for:  Left foot pain, Diabetic neuropathy with neurologic complication (H)        Left foot   Quantity:  1 Units   Refills:  0       oxyCODONE-acetaminophen 5-325 MG per tablet   Commonly known as:  PERCOCET   Used for:  Lumbago, Bilateral low back pain with sciatica, sciatica laterality unspecified        Dose:  1 tablet   Take 1 tablet by mouth every 8 hours as needed for moderate to severe pain (try to limit use, no further prescriptions until seen in pain clinic)   Quantity:  60 tablet   Refills:  0       polyethylene glycol powder   Commonly known as:  MIRALAX/GLYCOLAX        Refills:  0       potassium chloride SA 20 MEQ CR tablet   Commonly known as:  potassium chloride   Used for:  Hypokalemia        Dose:  40 mEq   Take 2 tablets (40 mEq) by mouth 4 times daily   Quantity:  240 tablet   Refills:  3       senna 8.6 MG tablet   Commonly known as:  SENOKOT   Used for:  Constipation        Dose:  1 tablet   Take 1 tablet by mouth 2 times daily   Quantity:  45 tablet   Refills:  0       spironolactone 50 MG tablet   Commonly known as:  ALDACTONE        Dose:  50 mg   Take 1 tablet (50 mg) by mouth daily   Quantity:  30 tablet   Refills:  11       tiotropium 18 MCG capsule   Commonly known as:  SPIRIVA HANDIHALER   Used for:  COPD (chronic obstructive pulmonary disease) (H)        Inhale contents of one capsule daily.   Quantity:  30 capsule   Refills:  1       topiramate 25  MG tablet   Commonly known as:  TOPAMAX   Used for:  Morbid obesity, unspecified obesity type (H)        Dose:  75 mg   Take 3 tablets (75 mg) by mouth 2 times daily   Quantity:  180 tablet   Refills:  3       torsemide 100 MG tablet   Commonly known as:  DEMADEX   Used for:  Chronic diastolic heart failure (H)        Dose:  100 mg   Take 1 tablet (100 mg) by mouth 2 times daily   Quantity:  180 tablet   Refills:  1       * Notice:  This list has 9 medication(s) that are the same as other medications prescribed for you. Read the directions carefully, and ask your doctor or other care provider to review them with you.         Where to get your medicines      Some of these will need a paper prescription and others can be bought over the counter. Ask your nurse if you have questions.     Bring a paper prescription for each of these medications     oxyCODONE 10 MG IR tablet    warfarin 5 MG tablet                Protect others around you: Learn how to safely use, store and throw away your medicines at www.disposemymeds.org.             Medication List: This is a list of all your medications and when to take them. Check marks below indicate your daily home schedule. Keep this list as a reference.      Medications           Morning Afternoon Evening Bedtime As Needed    acetaminophen 325 MG tablet   Commonly known as:  TYLENOL   Take 2 tablets (650 mg) by mouth 3 times daily as needed for mild pain or fever (total acetaminophen dose should not exceed 3000 mg per day)   Last time this was given:  650 mg on 4/7/2017  8:32 PM                                albuterol 108 (90 BASE) MCG/ACT Inhaler   Commonly known as:  PROAIR HFA/PROVENTIL HFA/VENTOLIN HFA   Inhale 2 puffs into the lungs every 6 hours as needed.                                bisacodyl 10 MG Suppository   Commonly known as:  DULCOLAX   Place 1 suppository (10 mg) rectally daily as needed for constipation                                * blood glucose  monitoring lancets   Use to test blood sugars 4 times daily or as directed.                                * blood glucose monitoring lancets   Use to test blood sugar 4 times daily or as directed.                                blood glucose monitoring meter device kit   Use to test blood sugars 2 times daily or as directed.                                * blood glucose monitoring test strip   Commonly known as:  ACCU-CHEK RON   Use to test blood sugars 2 times daily or as directed.                                * blood glucose monitoring test strip   Commonly known as:  ACCU-CHEK SMARTVIEW   Use to test blood sugar 5 times daily                                buPROPion 100 MG 12 hr tablet   Commonly known as:  WELLBUTRIN SR   Take 1 tablet (100 mg) by mouth 2 times daily   Last time this was given:  100 mg on 4/8/2017  9:00 AM                                capsaicin 0.025 % Crea cream   Commonly known as:  ZOSTRIX   Apply 1 g topically 3 times daily as needed                                ciclopirox 8 % Soln   Externally apply topically daily To toenails.                                diazepam 5 MG tablet   Commonly known as:  VALIUM   Hand carry to procedure on 2/21. Do not take until instructed.                                diclofenac 1 % Gel topical gel   Commonly known as:  VOLTAREN   Place 4 g onto the skin 4 times daily   Last time this was given:  4 g on 4/7/2017  4:05 PM                                DULoxetine 20 MG EC capsule   Commonly known as:  CYMBALTA   Take 1 capsule (20 mg) by mouth 2 times daily   Last time this was given:  20 mg on 4/8/2017  9:00 AM                                Efinaconazole 10 % Soln   Externally apply topically daily To toenails.                                gabapentin 300 MG capsule   Commonly known as:  NEURONTIN   Take 2 capsules (600 mg) by mouth 2 times daily   Last time this was given:  600 mg on 4/8/2017  9:01 AM                                hydrALAZINE  "25 MG tablet   Commonly known as:  APRESOLINE   Take 1 tab (25 mg) in am, 2 tabs (50 mg) midday, and 2 tabs (50 mg) in pm daily   Last time this was given:  25 mg on 4/7/2017  8:37 PM                                insulin glargine U-300 300 UNIT/ML injection   Commonly known as:  TOUJEO   Inject 160 units SQ each am.   Last time this was given:  160 Units on 4/8/2017  9:09 AM                                * insulin pen needle 30G X 8 MM   Commonly known as:  NOVOFINE   Use 2X daily or as directed.                                * insulin pen needle 32G X 4 MM   Commonly known as:  BD RIVER U/F   Use 6 daily or as directed.                                insulin syringe-needle U-100 30G X 1/2\" 0.5 ML   Commonly known as:  BD insulin syringe ULTRAFINE   Use one syringe 3 daily or as directed.                                levothyroxine 200 MCG tablet   Commonly known as:  SYNTHROID/LEVOTHROID   Take 1 tablet (200 mcg) by mouth daily   Last time this was given:  200 mcg on 4/8/2017  9:02 AM                                liraglutide 18 MG/3ML soln   Commonly known as:  VICTOZA PEN   Inject 1.8 mg Subcutaneous daily                                metoprolol 50 MG 24 hr tablet   Commonly known as:  TOPROL-XL   Take 0.5 tablets (25 mg) by mouth At Bedtime                                morphine 15 MG 12 hr tablet   Commonly known as:  MS CONTIN   Take 15 mg by mouth daily as needed                                Nebulizer Compressor Kit   1 Device 4 times daily as needed.                                nicotine 14 MG/24HR 24 hr patch   Commonly known as:  [employer]S NICOTINE   Place 1 patch onto the skin every 24 hours                                NovoLOG FLEXPEN 100 UNIT/ML injection   Inject 60 units with meals plus correction. Pt uses approx 180 units in 24 hrs.   Last time this was given:  60 Units on 4/8/2017  1:13 PM   Generic drug:  insulin aspart                                nystatin cream   Commonly known as:  " MYCOSTATIN   Apply topically 2 times daily To toenails                                order for DME   Use your CPAP device as directed by your provider. Pressure change to min 13 max 18cwp                                * order for DME   Equipment being ordered: Challenger Herminia walker if available - patient needs seat, basket and brakes.                                * order for DME   Equipment being ordered:  [id number] $92  Plantar, fasciitis, LG, night splint                                * order for DME   Left foot                                oxyCODONE 10 MG IR tablet   Commonly known as:  ROXICODONE   Take 1-1.5 tablets (10-15 mg) by mouth every 3 hours as needed for moderate to severe pain   Last time this was given:  15 mg on 4/8/2017  1:12 PM                                oxyCODONE-acetaminophen 5-325 MG per tablet   Commonly known as:  PERCOCET   Take 1 tablet by mouth every 8 hours as needed for moderate to severe pain (try to limit use, no further prescriptions until seen in pain clinic)                                polyethylene glycol powder   Commonly known as:  MIRALAX/GLYCOLAX                                potassium chloride SA 20 MEQ CR tablet   Commonly known as:  potassium chloride   Take 2 tablets (40 mEq) by mouth 4 times daily   Last time this was given:  40 mEq on 4/8/2017 12:22 PM                                senna 8.6 MG tablet   Commonly known as:  SENOKOT   Take 1 tablet by mouth 2 times daily   Last time this was given:  1 tablet on 4/8/2017  9:02 AM                                spironolactone 50 MG tablet   Commonly known as:  ALDACTONE   Take 1 tablet (50 mg) by mouth daily   Last time this was given:  50 mg on 4/8/2017  9:01 AM                                tiotropium 18 MCG capsule   Commonly known as:  SPIRIVA HANDIHALER   Inhale contents of one capsule daily.                                topiramate 25 MG tablet   Commonly known as:  TOPAMAX   Take 3 tablets (75  mg) by mouth 2 times daily   Last time this was given:  75 mg on 4/8/2017  9:01 AM                                torsemide 100 MG tablet   Commonly known as:  DEMADEX   Take 1 tablet (100 mg) by mouth 2 times daily   Last time this was given:  100 mg on 4/8/2017  9:00 AM                                warfarin 5 MG tablet   Commonly known as:  COUMADIN   Take 2 tablets (10 mg) by mouth once for 1 dose Take 15 mg PO daily on 10/20, 10/21, 10/22. Check INR on 10/23. Further dosing per INR level.   Last time this was given:  15 mg on 4/7/2017  6:25 PM                                * Notice:  This list has 9 medication(s) that are the same as other medications prescribed for you. Read the directions carefully, and ask your doctor or other care provider to review them with you.

## 2017-04-06 NOTE — ED PROVIDER NOTES
History     Chief Complaint   Patient presents with     Foot Pain     Has pain and swelling in right foot.  Has noted this for the past 2-3 days.  Now unable to get sock on.      HPI  Pricilla Borwn is a 42 year old female with a history of atrial fibrillation on Coumadin, hypertension, diabetes mellitus, dilated cardiomyopathy, and PEwho presents to the Emergency Department with right foot swelling. She states that the foot started swelling last night and this morning. She denies any fevers or trauma. The patient reports that the more pressure she puts on the foot, the more pain she has. She has been taking Tylenol at home in addition to her oxycodone and morphine with no improvement in her pain.     She states that her left foot had similar symptoms in October. She states that she had a DVT even though she was anticoagulated at that time.  It was felt that her venous clot was chronic and noncontributory.  Her INR was therapeutic and correlated with Chromogenic factor 10 levels.  Per chart review this pain was due to an unidentified etiology, most likely inflammatory.  She did have inflammatory markers at the time.  She was initially treated for infection but MRI turned out to be negative.  She was seen by podiatry and was ultimately fitted with Unna boot.  Her symptoms were felt related to venous stasis and diabetic neuropathy.    The patient states that she stopped taking her diuretics 2 days ago secondary to her right foot pain.  She states that she is unable to ambulate her bathroom in time to urinate if she takes her diuretics.  She feels as if she is retaining water but denies dyspnea and chest pain.    I have reviewed the Medications, Allergies, Past Medical and Surgical History, and Social History in the [employer] system.    PAST MEDICAL HISTORY  Past Medical History:   Diagnosis Date     A-fib (H) 2011    on coumadin since 1/13     Asthma     as a kid     Chest pain 2/1/2017     Chronic anticoagulation for  a-fib 2/15/2013    INR's [facility]     Diabetes mellitus (H) 2012     Diastolic heart failure 2/15/2013     Dilated cardiomyopathy (H) 1/8/2013     HTN (hypertension)      Hyperthyroidism     Graves, s/p I131 1/13, now on prednisone and methimazole     Morbid obesity (H)      Pulmonary embolism (H) 1/12    hospitalized in [place], on lovenox/coumadin for a few months but stopped, hypercoag w/u neg per pt     Sleep apnea     using CPAP     PAST SURGICAL HISTORY  No past surgical history on file.  FAMILY HISTORY  Family History   Problem Relation Age of Onset     Thyroid Disease Mother      Grave's D     DIABETES Mother      HEART DISEASE Mother      CEREBROVASCULAR DISEASE Mother      dec. 56 yo     Hypertension Mother      Thyroid Disease Maternal Aunt      HEART DISEASE Sister      Heart Murmur     DIABETES Sister      CANCER No family hx of      Glaucoma No family hx of      Macular Degeneration No family hx of      Thyroid Disease Maternal Uncle      HEART DISEASE Father      dec in his 30s, [place]     Psychotic Disorder Brother      Bipolar     DIABETES Brother      Thyroid Disease Brother      Hyper Thyroid     HEART DISEASE Brother      Thyroid Disease Sister      Hyper Thyroid     Thyroid Disease Sister      Hyper Thyroid     Thyroid Disease Sister      Mental Health Problems     SOCIAL HISTORY  Social History   Substance Use Topics     Smoking status: Light Tobacco Smoker     Packs/day: 0.25     Years: 13.00     Types: Cigarettes     Smokeless tobacco: Never Used      Comment: down to 5 cigs     Alcohol use No     MEDICATIONS  No current facility-administered medications for this encounter.      Current Outpatient Prescriptions   Medication     potassium chloride SA (POTASSIUM CHLORIDE) 20 MEQ CR tablet     blood glucose monitoring (ACCU-CHEK FASTCLIX) lancets     blood glucose monitoring (ACCU-CHEK SMARTVIEW) test strip     torsemide (DEMADEX) 100 MG tablet     insulin glargine U-300  "(TOUJEO) 300 UNIT/ML injection     NOVOLOG FLEXPEN 100 UNIT/ML soln     polyethylene glycol (MIRALAX/GLYCOLAX) powder     topiramate (TOPAMAX) 25 MG tablet     acetaminophen (TYLENOL) 325 MG tablet     levothyroxine (SYNTHROID/LEVOTHROID) 200 MCG tablet     morphine (MS CONTIN) 15 MG 12 hr tablet     JANTOVEN 10 MG tablet     buPROPion (WELLBUTRIN SR) 100 MG 12 hr tablet     hydrALAZINE (APRESOLINE) 25 MG tablet     liraglutide (VICTOZA PEN) 18 MG/3ML soln     diclofenac (VOLTAREN) 1 % GEL     warfarin (COUMADIN) 7.5 MG tablet     metoprolol (TOPROL-XL) 50 MG 24 hr tablet     capsaicin (ZOSTRIX) 0.025 % CREA     insulin pen needle (BD RIVER U/F) 32G X 4 MM     ciclopirox 8 % SOLN     nystatin (MYCOSTATIN) cream     Efinaconazole 10 % SOLN     insulin pen needle (NOVOFINE) 30G X 8 MM     gabapentin (NEURONTIN) 300 MG capsule     spironolactone (ALDACTONE) 50 MG tablet     insulin syringe-needle U-100 (BD INSULIN SYRINGE ULTRAFINE) 30G X 1/2\" 0.5 ML     blood glucose monitoring (ONE TOUCH DELICA) lancets     oxyCODONE-acetaminophen (PERCOCET) 5-325 MG per tablet     blood glucose (ACCU-CHEK RON) test strip     DULoxetine (CYMBALTA) 20 MG capsule     senna (SENOKOT) 8.6 MG tablet     bisacodyl (DULCOLAX) 10 MG suppository     Blood Glucose Monitoring Suppl (ACCU-CHEK RON PLUS) W/DEVICE KIT     albuterol (PROAIR HFA, PROVENTIL HFA, VENTOLIN HFA) 108 (90 BASE) MCG/ACT inhaler     diazepam (VALIUM) 5 MG tablet     order for DME     oxyCODONE (ROXICODONE) 10 MG immediate release tablet     order for DME     order for DME     nicotine ([employer] NICOTINE) 14 MG/24HR patch 2h hr     tiotropium (SPIRIVA HANDIHALER) 18 MCG inhalation capsule     Respiratory Therapy Supplies (NEBULIZER COMPRESSOR) KIT     ORDER FOR DME     ALLERGIES  Allergies   Allergen Reactions     Penicillins Other (See Comments)     CHILDHOOD ALLERGY     Ibuprofen Sodium Hives and Rash      Review of Systems   Constitutional: Negative for chills and fever. "   Respiratory: Negative for shortness of breath.    Cardiovascular: Positive for leg swelling.   Musculoskeletal:        Right foot pain   Skin: Negative for rash and wound.   All other systems reviewed and are negative.      Physical Exam      Physical Exam   Constitutional: She appears well-developed and well-nourished. No distress.   HENT:   Head: Atraumatic.   Neck: Normal range of motion.   Cardiovascular: Normal heart sounds and intact distal pulses.    Pulses:       Dorsalis pedis pulses are 1+ on the right side        Posterior tibial pulses are 1+ on the right side   Pulmonary/Chest: Effort normal and breath sounds normal. No respiratory distress.   Abdominal: Soft. There is no tenderness.   Musculoskeletal: Normal range of motion. She exhibits edema.        Right knee: Normal.        Right ankle: She exhibits normal range of motion. No tenderness.        Right lower leg: She exhibits swelling.        Right foot: There is tenderness and swelling. There is normal range of motion and normal capillary refill.   Neurological: She is alert. She has normal strength.   Skin: Skin is warm. No rash noted. She is not diaphoretic. No pallor.   Nursing note and vitals reviewed.      ED Course     ED Course     Procedures        Results for orders placed or performed during the hospital encounter of 04/06/17   Foot  XR, G/E 3 views, right    Narrative    FOOT RIGHT THREE OR MORE VIEWS  4/6/2017 7:56 PM     COMPARISON: None    HISTORY: Pain - evaluate for fracture.      Impression    IMPRESSION: There is marked soft tissue swelling throughout the right  foot. There is no evidence for fracture or dislocation of the right  foot.   US Lower Extremity Venous Duplex Right    Narrative    ULTRASOUND VENOUS RIGHT LOWER EXTREMITY WITH DOPPLER   4/6/2017 7:51  PM     HISTORY: Right leg pain and swelling.    COMPARISON: None.    TECHNIQUE: Spectral waveform and color Doppler evaluation were  performed.    FINDINGS: Note that the  calf veins are not visualized in entirety due  to large body habitus. Normal compressibility and unremarkable Doppler  waveform evaluation of the right common femoral, femoral, popliteal  and posterior tibial veins. Edema is present in the soft tissues of  the right foot.      Impression    IMPRESSION: No evidence of thrombus in the major veins of the right  lower extremity.    CLARI LEMUS MD   CBC with platelets differential   Result Value Ref Range    WBC 12.1 (H) 4.0 - 11.0 10e9/L    RBC Count 4.08 3.8 - 5.2 10e12/L    Hemoglobin 12.2 11.7 - 15.7 g/dL    Hematocrit 37.2 35.0 - 47.0 %    MCV 91 78 - 100 fl    MCH 29.9 26.5 - 33.0 pg    MCHC 32.8 31.5 - 36.5 g/dL    RDW 14.4 10.0 - 15.0 %    Platelet Count 337 150 - 450 10e9/L    Diff Method Automated Method     % Neutrophils 67.1 %    % Lymphocytes 25.8 %    % Monocytes 4.7 %    % Eosinophils 1.7 %    % Basophils 0.2 %    % Immature Granulocytes 0.5 %    Nucleated RBCs 0 0 /100    Absolute Neutrophil 8.1 1.6 - 8.3 10e9/L    Absolute Lymphocytes 3.1 0.8 - 5.3 10e9/L    Absolute Monocytes 0.6 0.0 - 1.3 10e9/L    Absolute Eosinophils 0.2 0.0 - 0.7 10e9/L    Absolute Basophils 0.0 0.0 - 0.2 10e9/L    Abs Immature Granulocytes 0.1 0 - 0.4 10e9/L    Absolute Nucleated RBC 0.0    Basic metabolic panel   Result Value Ref Range    Sodium 141 133 - 144 mmol/L    Potassium 3.9 3.4 - 5.3 mmol/L    Chloride 107 94 - 109 mmol/L    Carbon Dioxide 28 20 - 32 mmol/L    Anion Gap 6 3 - 14 mmol/L    Glucose 185 (H) 70 - 99 mg/dL    Urea Nitrogen 12 7 - 30 mg/dL    Creatinine 0.92 0.52 - 1.04 mg/dL    GFR Estimate 67 >60 mL/min/1.7m2    GFR Estimate If Black 81 >60 mL/min/1.7m2    Calcium 8.9 8.5 - 10.1 mg/dL   INR   Result Value Ref Range    INR 2.94 (H) 0.86 - 1.14   CRP inflammation   Result Value Ref Range    CRP Inflammation 84.0 (H) 0.0 - 8.0 mg/L   Erythrocyte sedimentation rate auto   Result Value Ref Range    Sed Rate 60 (H) 0 - 20 mm/h     *Note: Due to a large number  of results and/or encounters for the requested time period, some results have not been displayed. A complete set of results can be found in Results Review.      MR right foot ordered but not able to be completed due to patient's weight.    Discussed with orthopedics.  Do not feel CT would be helpful at this point.  Feel Charcot foot likely.  Will also add procalcitonin    Discussed with Dr. Ellis-accepted for admission.  Patient unable to ambulate secondary to right foot pain.  Also has elevated inflammatory markers and should be observed for signs of infection.  No hard signs of infection currently so antibiotics will not be started at this point.     Critical Care time:             Labs Ordered and Resulted from Time of ED Arrival Up to the Time of Departure from the ED   CBC WITH PLATELETS DIFFERENTIAL - Abnormal; Notable for the following:        Result Value    WBC 12.1 (*)     All other components within normal limits   BASIC METABOLIC PANEL - Abnormal; Notable for the following:     Glucose 185 (*)     All other components within normal limits   INR - Abnormal; Notable for the following:     INR 2.94 (*)     All other components within normal limits   CRP INFLAMMATION - Abnormal; Notable for the following:     CRP Inflammation 84.0 (*)     All other components within normal limits   ERYTHROCYTE SEDIMENTATION RATE AUTO - Abnormal; Notable for the following:     Sed Rate 60 (*)     All other components within normal limits   PROCALCITONIN   PERIPHERAL IV CATHETER       Assessments & Plan (with Medical Decision Making)   42 year old female with morbid obesity, diabetes, heart failure, atrial fibrillation on Coumadin here with nontraumatic right foot pain.  The patient has pain of the midfoot with swelling but no erythema.  Her right leg is also swollen.  She states that she stopped taking her diuretics 2 days ago as her foot hurt too much to walk to the bathroom.  The patient has a mildly elevated white  count and elevated inflammatory markers.  Her foot radiograph does not reveal any bony abnormality and her right lower extremity ultrasound does not reveal any DVT.  Although infectious processes such as osteomyelitis is possible, it seems less likely today.  She did have similar symptoms in her left foot in October 2016.  MRI of her foot at that time was negative for signs of infection or stress fracture.  Unfortunately today, her weight exceeds the capacity of the MRI and one will not be possible.  The patient was discussed with orthopedics who felt that Charcot foot is most likely explanation for her symptoms.  They did request a pro calcitonin to help further delineate whether infectious process is present.  Orthopedics did not feel that a CT scan would be helpful at this point.  The patient is unable to ambulate and has stopped taking her diuretics due to her right foot pain.  As such, she will require admission to the hospital for symptom control, foot protection, podiatry consultation, and discharge planning.    I have reviewed the nursing notes.    I have reviewed the findings, diagnosis, plan and need for follow up with the patient.    Current Discharge Medication List          Final diagnoses:   Right foot pain   Right leg swelling   Elevated C-reactive protein (CRP)   Elevated sed rate   I, Marty Campos, am serving as a trained medical scribe to document services personally performed by Juan Baldwin MD, based on the provider's statements to me.      IJuan MD, was physically present and have reviewed and verified the accuracy of this note documented by Marty Campos.      4/6/2017   [facility] EMERGENCY DEPARTMENT     Juan Baldwin MD  04/06/17 [id number]

## 2017-04-06 NOTE — IP AVS SNAPSHOT
[facility]    [address]    [facility] [place] [address]    Phone:  [phone]                                       After Visit Summary   4/6/2017    Pricilla Brown    MRN: [id number]           After Visit Summary Signature Page     I have received my discharge instructions, and my questions have been answered. I have discussed any challenges I see with this plan with the nurse or doctor.    ..........................................................................................................................................  Patient/Patient Representative Signature      ..........................................................................................................................................  Patient Representative Print Name and Relationship to Patient    ..................................................               ................................................  Date                                            Time    ..........................................................................................................................................  Reviewed by Signature/Title    ...................................................              ..............................................  Date                                                            Time

## 2017-04-07 LAB
ANION GAP SERPL CALCULATED.3IONS-SCNC: 7 MMOL/L (ref 3–14)
BUN SERPL-MCNC: 12 MG/DL (ref 7–30)
CALCIUM SERPL-MCNC: 8.3 MG/DL (ref 8.5–10.1)
CHLORIDE SERPL-SCNC: 104 MMOL/L (ref 94–109)
CO2 SERPL-SCNC: 29 MMOL/L (ref 20–32)
CREAT SERPL-MCNC: 0.86 MG/DL (ref 0.52–1.04)
CRP SERPL-MCNC: 82.4 MG/L (ref 0–8)
ERYTHROCYTE [DISTWIDTH] IN BLOOD BY AUTOMATED COUNT: 14.5 % (ref 10–15)
ERYTHROCYTE [SEDIMENTATION RATE] IN BLOOD BY WESTERGREN METHOD: 61 MM/H (ref 0–20)
GFR SERPL CREATININE-BSD FRML MDRD: 72 ML/MIN/1.7M2
GLUCOSE BLDC GLUCOMTR-MCNC: 116 MG/DL (ref 70–99)
GLUCOSE BLDC GLUCOMTR-MCNC: 125 MG/DL (ref 70–99)
GLUCOSE BLDC GLUCOMTR-MCNC: 153 MG/DL (ref 70–99)
GLUCOSE BLDC GLUCOMTR-MCNC: 169 MG/DL (ref 70–99)
GLUCOSE BLDC GLUCOMTR-MCNC: 219 MG/DL (ref 70–99)
GLUCOSE SERPL-MCNC: 163 MG/DL (ref 70–99)
HBA1C MFR BLD: 9 % (ref 4.3–6)
HCT VFR BLD AUTO: 38.1 % (ref 35–47)
HGB BLD-MCNC: 12.2 G/DL (ref 11.7–15.7)
INR PPP: 2.76 (ref 0.86–1.14)
MCH RBC QN AUTO: 29.1 PG (ref 26.5–33)
MCHC RBC AUTO-ENTMCNC: 32 G/DL (ref 31.5–36.5)
MCV RBC AUTO: 91 FL (ref 78–100)
PLATELET # BLD AUTO: 336 10E9/L (ref 150–450)
POTASSIUM SERPL-SCNC: 3.4 MMOL/L (ref 3.4–5.3)
RBC # BLD AUTO: 4.19 10E12/L (ref 3.8–5.2)
SODIUM SERPL-SCNC: 140 MMOL/L (ref 133–144)
WBC # BLD AUTO: 11.1 10E9/L (ref 4–11)

## 2017-04-07 PROCEDURE — 86140 C-REACTIVE PROTEIN: CPT | Performed by: STUDENT IN AN ORGANIZED HEALTH CARE EDUCATION/TRAINING PROGRAM

## 2017-04-07 PROCEDURE — 85652 RBC SED RATE AUTOMATED: CPT | Performed by: STUDENT IN AN ORGANIZED HEALTH CARE EDUCATION/TRAINING PROGRAM

## 2017-04-07 PROCEDURE — 36415 COLL VENOUS BLD VENIPUNCTURE: CPT | Performed by: STUDENT IN AN ORGANIZED HEALTH CARE EDUCATION/TRAINING PROGRAM

## 2017-04-07 PROCEDURE — A9270 NON-COVERED ITEM OR SERVICE: HCPCS | Mod: GY

## 2017-04-07 PROCEDURE — 80048 BASIC METABOLIC PNL TOTAL CA: CPT | Performed by: STUDENT IN AN ORGANIZED HEALTH CARE EDUCATION/TRAINING PROGRAM

## 2017-04-07 PROCEDURE — 12000001 ZZH R&B MED SURG/OB UMMC

## 2017-04-07 PROCEDURE — 94660 CPAP INITIATION&MGMT: CPT

## 2017-04-07 PROCEDURE — 25000132 ZZH RX MED GY IP 250 OP 250 PS 637: Mod: GY | Performed by: STUDENT IN AN ORGANIZED HEALTH CARE EDUCATION/TRAINING PROGRAM

## 2017-04-07 PROCEDURE — A9270 NON-COVERED ITEM OR SERVICE: HCPCS | Mod: GY | Performed by: STUDENT IN AN ORGANIZED HEALTH CARE EDUCATION/TRAINING PROGRAM

## 2017-04-07 PROCEDURE — 85610 PROTHROMBIN TIME: CPT | Performed by: STUDENT IN AN ORGANIZED HEALTH CARE EDUCATION/TRAINING PROGRAM

## 2017-04-07 PROCEDURE — 25000131 ZZH RX MED GY IP 250 OP 636 PS 637: Mod: GY | Performed by: STUDENT IN AN ORGANIZED HEALTH CARE EDUCATION/TRAINING PROGRAM

## 2017-04-07 PROCEDURE — 83036 HEMOGLOBIN GLYCOSYLATED A1C: CPT | Performed by: STUDENT IN AN ORGANIZED HEALTH CARE EDUCATION/TRAINING PROGRAM

## 2017-04-07 PROCEDURE — 40000275 ZZH STATISTIC RCP TIME EA 10 MIN

## 2017-04-07 PROCEDURE — 85027 COMPLETE CBC AUTOMATED: CPT | Performed by: STUDENT IN AN ORGANIZED HEALTH CARE EDUCATION/TRAINING PROGRAM

## 2017-04-07 PROCEDURE — 99232 SBSQ HOSP IP/OBS MODERATE 35: CPT | Performed by: PHYSICIAN ASSISTANT

## 2017-04-07 PROCEDURE — 25000125 ZZHC RX 250: Performed by: INTERNAL MEDICINE

## 2017-04-07 PROCEDURE — 25000132 ZZH RX MED GY IP 250 OP 250 PS 637: Mod: GY

## 2017-04-07 PROCEDURE — 94640 AIRWAY INHALATION TREATMENT: CPT

## 2017-04-07 PROCEDURE — 00000146 ZZHCL STATISTIC GLUCOSE BY METER IP

## 2017-04-07 PROCEDURE — 99222 1ST HOSP IP/OBS MODERATE 55: CPT | Mod: AI | Performed by: INTERNAL MEDICINE

## 2017-04-07 RX ORDER — ALBUTEROL SULFATE 0.83 MG/ML
2.5 SOLUTION RESPIRATORY (INHALATION) EVERY 4 HOURS PRN
Status: DISCONTINUED | OUTPATIENT
Start: 2017-04-07 | End: 2017-04-08 | Stop reason: HOSPADM

## 2017-04-07 RX ORDER — WARFARIN SODIUM 7.5 MG/1
15 TABLET ORAL
Status: COMPLETED | OUTPATIENT
Start: 2017-04-07 | End: 2017-04-07

## 2017-04-07 RX ADMIN — ACETAMINOPHEN 650 MG: 325 TABLET, FILM COATED ORAL at 20:32

## 2017-04-07 RX ADMIN — SENNOSIDES 1 TABLET: 8.6 TABLET, FILM COATED ORAL at 20:33

## 2017-04-07 RX ADMIN — POTASSIUM CHLORIDE 40 MEQ: 750 TABLET, FILM COATED, EXTENDED RELEASE ORAL at 16:03

## 2017-04-07 RX ADMIN — INSULIN GLARGINE 160 UNITS: 300 INJECTION, SOLUTION SUBCUTANEOUS at 07:42

## 2017-04-07 RX ADMIN — GABAPENTIN 600 MG: 300 CAPSULE ORAL at 07:53

## 2017-04-07 RX ADMIN — HYDRALAZINE HYDROCHLORIDE 25 MG: 25 TABLET ORAL at 20:37

## 2017-04-07 RX ADMIN — POLYETHYLENE GLYCOL 3350 17 G: 17 POWDER, FOR SOLUTION ORAL at 07:55

## 2017-04-07 RX ADMIN — DULOXETINE 20 MG: 20 CAPSULE, DELAYED RELEASE PELLETS ORAL at 20:33

## 2017-04-07 RX ADMIN — GABAPENTIN 600 MG: 300 CAPSULE ORAL at 20:33

## 2017-04-07 RX ADMIN — INSULIN ASPART 4 UNITS: 100 INJECTION, SOLUTION INTRAVENOUS; SUBCUTANEOUS at 12:04

## 2017-04-07 RX ADMIN — DICLOFENAC SODIUM 4 G: 10 GEL TOPICAL at 07:52

## 2017-04-07 RX ADMIN — OXYCODONE HYDROCHLORIDE 15 MG: 5 TABLET ORAL at 15:30

## 2017-04-07 RX ADMIN — OXYCODONE HYDROCHLORIDE 15 MG: 5 TABLET ORAL at 00:23

## 2017-04-07 RX ADMIN — BUPROPION HYDROCHLORIDE 100 MG: 100 TABLET, FILM COATED, EXTENDED RELEASE ORAL at 20:33

## 2017-04-07 RX ADMIN — OXYCODONE HYDROCHLORIDE 15 MG: 5 TABLET ORAL at 21:26

## 2017-04-07 RX ADMIN — RANITIDINE HYDROCHLORIDE 150 MG: 150 TABLET, FILM COATED ORAL at 07:54

## 2017-04-07 RX ADMIN — POTASSIUM CHLORIDE 40 MEQ: 750 TABLET, FILM COATED, EXTENDED RELEASE ORAL at 20:33

## 2017-04-07 RX ADMIN — OXYCODONE HYDROCHLORIDE 15 MG: 5 TABLET ORAL at 03:39

## 2017-04-07 RX ADMIN — TOPIRAMATE 75 MG: 25 TABLET, FILM COATED ORAL at 20:33

## 2017-04-07 RX ADMIN — WARFARIN SODIUM 15 MG: 7.5 TABLET ORAL at 18:25

## 2017-04-07 RX ADMIN — BUPROPION HYDROCHLORIDE 100 MG: 100 TABLET, FILM COATED, EXTENDED RELEASE ORAL at 07:52

## 2017-04-07 RX ADMIN — SPIRONOLACTONE 50 MG: 25 TABLET ORAL at 07:55

## 2017-04-07 RX ADMIN — TORSEMIDE 100 MG: 100 TABLET ORAL at 07:54

## 2017-04-07 RX ADMIN — DICLOFENAC SODIUM 4 G: 10 GEL TOPICAL at 16:05

## 2017-04-07 RX ADMIN — ALBUTEROL SULFATE 2.5 MG: 2.5 SOLUTION RESPIRATORY (INHALATION) at 21:13

## 2017-04-07 RX ADMIN — INSULIN ASPART 1 UNITS: 100 INJECTION, SOLUTION INTRAVENOUS; SUBCUTANEOUS at 03:39

## 2017-04-07 RX ADMIN — OXYCODONE HYDROCHLORIDE 15 MG: 5 TABLET ORAL at 09:31

## 2017-04-07 RX ADMIN — POTASSIUM CHLORIDE 40 MEQ: 750 TABLET, FILM COATED, EXTENDED RELEASE ORAL at 07:54

## 2017-04-07 RX ADMIN — POTASSIUM CHLORIDE 40 MEQ: 750 TABLET, FILM COATED, EXTENDED RELEASE ORAL at 12:03

## 2017-04-07 RX ADMIN — DICLOFENAC SODIUM 4 G: 10 GEL TOPICAL at 12:02

## 2017-04-07 RX ADMIN — OXYCODONE HYDROCHLORIDE 15 MG: 5 TABLET ORAL at 06:27

## 2017-04-07 RX ADMIN — RANITIDINE HYDROCHLORIDE 150 MG: 150 TABLET, FILM COATED ORAL at 20:33

## 2017-04-07 RX ADMIN — SENNOSIDES 1 TABLET: 8.6 TABLET, FILM COATED ORAL at 07:55

## 2017-04-07 RX ADMIN — TORSEMIDE 100 MG: 100 TABLET ORAL at 20:33

## 2017-04-07 RX ADMIN — OXYCODONE HYDROCHLORIDE 15 MG: 5 TABLET ORAL at 12:43

## 2017-04-07 RX ADMIN — LEVOTHYROXINE SODIUM 200 MCG: 200 TABLET ORAL at 07:55

## 2017-04-07 RX ADMIN — DULOXETINE 20 MG: 20 CAPSULE, DELAYED RELEASE PELLETS ORAL at 07:55

## 2017-04-07 RX ADMIN — TOPIRAMATE 75 MG: 25 TABLET, FILM COATED ORAL at 07:53

## 2017-04-07 RX ADMIN — OXYCODONE HYDROCHLORIDE 15 MG: 5 TABLET ORAL at 18:25

## 2017-04-07 RX ADMIN — INSULIN ASPART 2 UNITS: 100 INJECTION, SOLUTION INTRAVENOUS; SUBCUTANEOUS at 07:38

## 2017-04-07 NOTE — ED NOTES
"ED to Floor Handoff      S:  Pricilla Brown is a 42 year old female who speaks English and lives unknown,  in a home  They arrived in the ED by car from home with a complaint of Foot Pain (Has pain and swelling in right foot.  Has noted this for the past 2-3 days.  Now unable to get sock on. )    Initial vitals were:   BP: 146/88  Pulse: 82  Temp: 98.7  F (37.1  C)  Resp: 20  Weight: (!) 219.5 kg (484 lb)  SpO2: 98 %  Allergies:   Allergies   Allergen Reactions     Penicillins Other (See Comments)     CHILDHOOD ALLERGY     Ibuprofen Sodium Hives and Rash   .  The meds given in the ED and their home medications are:   No current facility-administered medications for this encounter.      Current Outpatient Prescriptions   Medication     potassium chloride SA (POTASSIUM CHLORIDE) 20 MEQ CR tablet     blood glucose monitoring (ACCU-CHEK FASTCLIX) lancets     blood glucose monitoring (ACCU-CHEK SMARTVIEW) test strip     torsemide (DEMADEX) 100 MG tablet     insulin glargine U-300 (TOUJEO) 300 UNIT/ML injection     NOVOLOG FLEXPEN 100 UNIT/ML soln     polyethylene glycol (MIRALAX/GLYCOLAX) powder     topiramate (TOPAMAX) 25 MG tablet     acetaminophen (TYLENOL) 325 MG tablet     levothyroxine (SYNTHROID/LEVOTHROID) 200 MCG tablet     morphine (MS CONTIN) 15 MG 12 hr tablet     JANTOVEN 10 MG tablet     buPROPion (WELLBUTRIN SR) 100 MG 12 hr tablet     hydrALAZINE (APRESOLINE) 25 MG tablet     liraglutide (VICTOZA PEN) 18 MG/3ML soln     diclofenac (VOLTAREN) 1 % GEL     warfarin (COUMADIN) 7.5 MG tablet     metoprolol (TOPROL-XL) 50 MG 24 hr tablet     capsaicin (ZOSTRIX) 0.025 % CREA     insulin pen needle (BD RIVER U/F) 32G X 4 MM     ciclopirox 8 % SOLN     nystatin (MYCOSTATIN) cream     Efinaconazole 10 % SOLN     insulin pen needle (NOVOFINE) 30G X 8 MM     gabapentin (NEURONTIN) 300 MG capsule     spironolactone (ALDACTONE) 50 MG tablet     insulin syringe-needle U-100 (BD INSULIN SYRINGE ULTRAFINE) 30G X 1/2\" 0.5 " ML     blood glucose monitoring (ONE TOUCH DELICA) lancets     oxyCODONE-acetaminophen (PERCOCET) 5-325 MG per tablet     blood glucose (ACCU-CHEK RON) test strip     DULoxetine (CYMBALTA) 20 MG capsule     senna (SENOKOT) 8.6 MG tablet     bisacodyl (DULCOLAX) 10 MG suppository     Blood Glucose Monitoring Suppl (ACCU-CHEK RON PLUS) W/DEVICE KIT     albuterol (PROAIR HFA, PROVENTIL HFA, VENTOLIN HFA) 108 (90 BASE) MCG/ACT inhaler     diazepam (VALIUM) 5 MG tablet     order for DME     oxyCODONE (ROXICODONE) 10 MG immediate release tablet     order for DME     order for DME     nicotine ([employer] NICOTINE) 14 MG/24HR patch 2h hr     tiotropium (SPIRIVA HANDIHALER) 18 MCG inhalation capsule     Respiratory Therapy Supplies (NEBULIZER COMPRESSOR) KIT     ORDER FOR DME     Social demographics are   Social History     Social History     Marital status: Single     Spouse name: N/A     Number of children: N/A     Years of education: N/A     Social History Main Topics     Smoking status: Light Tobacco Smoker     Packs/day: 0.25     Years: 13.00     Types: Cigarettes     Smokeless tobacco: Never Used      Comment: down to 5 cigs     Alcohol use No     Drug use: No     Sexual activity: Yes     Partners: Male     Birth control/ protection: Condom     Other Topics Concern     Not on file     Social History Narrative    Single, no children        Gyn:        Last pap several years ago, no abnormal    No STIs            Patient is single.  She is no longer working.  She used to work in the area of customer service.  She is currently living with her sister in [place], [place].  She has no pets.  Patient has smoked a half pack of cigarettes a day for the past 10 plus years.  She states that she is down to 5 cigarettes a day with the aid of Wellbutrin.  She does not smoke cigars, pipes or chew tobacco.  She has 1 cup of coffee in the morning.  She does not drink alcohol.  Patient does not exercise.        B:   The  patient has been ill for 2 day(s) and during this time the symptoms have increased.  In the ED was diagnosed with   Final diagnoses:   Right foot pain   Right leg swelling   Elevated C-reactive protein (CRP)   Elevated sed rate    Infection/sepsis suspected:No Isolation type; No active isolations   A:   In the ED these meds were given: Medications - No data to display  Drips running?  No  Labs results   Labs Ordered and Resulted from Time of ED Arrival Up to the Time of Departure from the ED   CBC WITH PLATELETS DIFFERENTIAL - Abnormal; Notable for the following:        Result Value    WBC 12.1 (*)     All other components within normal limits   BASIC METABOLIC PANEL - Abnormal; Notable for the following:     Glucose 185 (*)     All other components within normal limits   INR - Abnormal; Notable for the following:     INR 2.94 (*)     All other components within normal limits   CRP INFLAMMATION - Abnormal; Notable for the following:     CRP Inflammation 84.0 (*)     All other components within normal limits   ERYTHROCYTE SEDIMENTATION RATE AUTO - Abnormal; Notable for the following:     Sed Rate 60 (*)     All other components within normal limits   PERIPHERAL IV CATHETER     Imaging Studies:   Recent Results (from the past 24 hour(s))   US Lower Extremity Venous Duplex Right    Narrative    ULTRASOUND VENOUS RIGHT LOWER EXTREMITY WITH DOPPLER   4/6/2017 7:51  PM     HISTORY: Right leg pain and swelling.    COMPARISON: None.    TECHNIQUE: Spectral waveform and color Doppler evaluation were  performed.    FINDINGS: Note that the calf veins are not visualized in entirety due  to large body habitus. Normal compressibility and unremarkable Doppler  waveform evaluation of the right common femoral, femoral, popliteal  and posterior tibial veins. Edema is present in the soft tissues of  the right foot.      Impression    IMPRESSION: No evidence of thrombus in the major veins of the right  lower extremity.    CLARI  MD ALLAN   Foot  XR, G/E 3 views, right    Narrative    FOOT RIGHT THREE OR MORE VIEWS  4/6/2017 7:56 PM     COMPARISON: None    HISTORY: Pain - evaluate for fracture.      Impression    IMPRESSION: There is marked soft tissue swelling throughout the right  foot. There is no evidence for fracture or dislocation of the right  foot.     Recent vital signs /88  Pulse 82  Temp 98.7  F (37.1  C) (Oral)  Resp 20  Wt (!) 219.5 kg (484 lb)  SpO2 98%  BMI 75.79 kg/m2  Cardiac Rhythm: ,      Abnormal labs/tests/findings requiring intervention:---  Pain control: poor  Nausea control: pt had none  R:   Transfer assistance needed: Independent  Family present during ED course? No   Family currently present? No  Pt needs tele? No  Code Status: Full Code  Tasks needing to be completed:---    Dottie Matthews  [facility]-- [id number] [facility]  [phone] [facility] ED

## 2017-04-07 NOTE — PROGRESS NOTES
Internal Medicine Daily Note   Patient: Pricilla Brown  MRN: [id number]  Admission Date: 4/6/2017  Hospital Day # 1    Assessment & Plan: Pricilla Brown is a 42 year old female with history of DMII, morbid obesity, dilated CM, hypothyroidism, Afib, asthma, and JYOTI who was admitted with right foot pain.     Right foot tenderness and edema: Presented with 1 week right foot swelling and pain with movement. Denies trauma. Similar episode of the left foot 10/2016, improved with soft boot, no surgical intervention. No infection found at that time. XR 4/6 negative. Unable to get MRI 2/2 body habitus. Ortho did not think CT would be beneficial. Unable to aspirate on admission. Exam c/w soft tissue edema, able to do active ROM of ankle which is reassuring that the joint is not grossly involved however cannot be ruled out. Does appear the extensor tendons are inflamed and tender which could be from a crystal tendonopathy. Infectious etiology unlikely given this resolved in the past without ABx. Consider colchicine crystal arthropathy a consideration. In the past, ortho has been concerned for Charcot foot but current pain more dorsal and plantar along MT joints. CRP 82.4 (84), but has been intermittently elevated in the past. Uric acid 7.4. WBC 11.1 (12.1). Procal negative.   - Podiatry consulted  - Will consider rheumatology consult it no intervention by podiatry and no improvement   - Trend inflammatory markers, CBC  - Continue Diclofenac gel, gabapentin, oxycodone prn  - Elevate leg  - PT and OT consulted      DMII: Complex PTA regimen includes Victoza, Glargine U300, and Novolog 60 units with meals. Hemoglobin A1c stable at 9.0% (8.8). Last seen OP by endocrinology 2/2017, at which time no changes were made to regimen. Of note, does not tolerate Metformin   - Consider endocrine consult if patient decompensates  - Continue Vitcoza, Glargine, HSSI  - Start PTA novolog. Consider increasing to very high SSI if  "trending up per conversation with endocrine. If carb coverage to be considered, would likely need 1 unit:1.5 gm carb per endo     Dilated CM: Follows OP with Dr. Lion of cardiology. PTA on torsemide, spironolactone, BB, hydralazine. Currently compensated  - Continue PTA meds    HTN: PTA on meds as above. BPs stable  - Continue PTA regimen     Pulmonary HTN: PTA on Coumadin and meds as above  - Monitor chromogenic X     Chronic Medical Problems  Hypothyroidism: Last TSH 0.75 2/13/17, PTA on synthroid 200mcg daily, continue  - Checking INR and chromogenic X  Atrial fibrillation: Rate controlled. PTA on metorpolol and warfarin, continue  JYOTI: PTA uses CPAP, but did not bring with her  Athsma: Stable without recent flare, continue inhalers      CODE: Full  DVT: Coumadin as above  Diet/fluids: Moderate carb  Disposition: Pending workup and improvement     Plan discussed with attending physician, Dr. Jerome Holbrook  Internal Medicine [facility] Hospitalist   [facility]   Pager: [phone]    ___________________________________________________________________    Subjective & Interval Hx:  No change in foot pain. Hurts to move even a little. Denies trauma. Slept poorly due to CPAP machine going off frequently. Appetite stable. No chest pain, dyspnea, abdominal pain. Denies confusion.     Last 24 hr care team notes reviewed.   ROS:  4 point ROS including Respiratory, CV, GI and , other than that noted in the HPI, is negative.    Medications: Reviewed in EPIC. List below for reference    Physical Exam:    /82 mmHg  Pulse 86  Temp(Src) 98.8  F (37.1  C) (Oral)  Resp 16  Ht 1.778 m (5' 10\")  Wt 87.4 kg (192 lb 10.9 oz)  BMI 27.65 kg/m2  SpO2 99%     GENERAL: Alert and oriented x 3. NAD.   HEENT: Anicteric sclera. Mucous membranes moist.   CV: RRR. S1, S2. No murmurs appreciated.   RESPIRATORY: Effort normal on room air. Lungs CTAB with no wheezing, rales, rhonchi.   GI: Large " abdomen, soft and non distended with normoactive bowel sounds present in all quadrants. No tenderness, rebound, guarding.   NEUROLOGICAL: No focal deficits. Moves all extremities.    EXTREMITIES: No peripheral edema. Intact bilateral pedal pulses. Right Foot > left Foot, right foot tenderness on dorsum of foot along extensor tendons with subQ fluid, also mild joint effusion as well but normal ROM but painful. Unable to bear weight, not grossly warm. No erythema noted.   SKIN: No jaundice. No rashes.     Labs & Studies of Note: I personally pertinent labs and procedures.

## 2017-04-07 NOTE — PROGRESS NOTES
Pt arrived to floor around 2215 via cart from ED. Pts belongings with her and secured in her room. Pt was able to stand and pivot to bed with her cane. Was oriented to the room and call light. Vitals and assessment done. Pt appears to be resting comfortably at this time. Continue to monitor. Call light in reach

## 2017-04-07 NOTE — PROGRESS NOTES
Acupuncture Clinical Internship Intake and Treatment Documentation   [facility]    Date:  4/7/2017  Patient Name:  Pricilla Brown   YOB: 1975     Repeat Patient:  no  Has patient had acupoint/acupressure treatment before:  yes    Signed consent placed in the medical record:  yes  Patient/Family verbalizes understanding of risks and benefits:  yes  Required information provided to patient:  yes    Diagnosis:  Elevated C-reactive protein (CRP) [R79.82]  Elevated sed rate [R70.0]  Right foot pain [M79.671]  Right leg swelling [M79.89]    Patient condition and treatment:  Patient was admitted on Tuesday for R foot pain and swelling  Reason for Intervention Today/Chief Complaint:  R foot pain    Isolation:  No  Type:  None    PRE-SCORE:  severe    Other Western medical information:  n/a    Medications  Current Facility-Administered Medications   Medication     Warfarin Therapy Reminder (Check START DATE - warfarin may be starting in the FUTURE)     warfarin (COUMADIN) tablet 15 mg     insulin aspart (NovoLOG) inj (RAPID ACTING)     acetaminophen (TYLENOL) tablet 650 mg     albuterol (PROAIR HFA/PROVENTIL HFA/VENTOLIN HFA) Inhaler 2 puff     bisacodyl (DULCOLAX) Suppository 10 mg     buPROPion (WELLBUTRIN SR) 12 hr tablet 100 mg     capsaicin (ZOSTRIX) cream 1 g     diclofenac (VOLTAREN) 1 % topical gel 4 g     DULoxetine (CYMBALTA) EC capsule 20 mg     gabapentin (NEURONTIN) capsule 600 mg     hydrALAZINE (APRESOLINE) tablet 25 mg     insulin glargine U-300 (TOUJEO) injection 160 Units     levothyroxine (SYNTHROID/LEVOTHROID) tablet 200 mcg     liraglutide (VICTOZA) injection 1.8 mg     metoprolol (TOPROL-XL) 24 hr tablet 25 mg     nicotine (NICODERM CQ) 14 MG/24HR 24 hr patch 1 patch     polyethylene glycol (MIRALAX/GLYCOLAX) Packet 17 g     potassium chloride SA (K-DUR/KLOR-CON M) CR tablet 40 mEq     sennosides (SENOKOT) tablet 1 tablet     spironolactone (ALDACTONE) tablet  50 mg     umeclidinium (INCRUSE ELLIPTA) 62.5 MCG/INH oral inhaler 1 puff     topiramate (TOPAMAX) tablet 75 mg     torsemide (DEMADEX) tablet 100 mg     naloxone (NARCAN) injection 0.1-0.4 mg     lidocaine 1 % 1 mL     lidocaine (LMX4) kit     sodium chloride (PF) 0.9% PF flush 3 mL     sodium chloride (PF) 0.9% PF flush 3 mL     Patient is already receiving anticoagulation with heparin, enoxaparin (LOVENOX), warfarin (COUMADIN)  or other anticoagulant medication     oxyCODONE (ROXICODONE) IR tablet 10-15 mg     melatonin tablet 1 mg     ondansetron (ZOFRAN-ODT) ODT tab 4 mg    Or     ondansetron (ZOFRAN) injection 4 mg     ranitidine (ZANTAC) tablet 150 mg     nicotine patch REMOVAL     nicotine Patch in Place     glucose 40 % gel 15-30 g    Or     dextrose 50 % injection 25-50 mL    Or     glucagon injection 1 mg     insulin aspart (NovoLOG) inj (RAPID ACTING)     No current outpatient prescriptions on file.       Pre-Treatment Assessment  Chief Complaint/ Reason for Intervention Today: R foot pain  Chief Complaint Pre-Score:  severe   Describe:  Pain began on Tuesday, currently throbbing constant pain, cold makes it better, walking makes it worse, not an infection,   Pain Location: lateral and dorsal R foot, foot is visibly swollen and very tender to palpation   Pre Session Pain:  Severe  Pre Session Anxiety:  None  Pre Session Nausea:  None    10 Traditional Chinese Medicine Assessment Questions  - Cold/ Heat:  neutral  - Sweat: none  - Headaches/Body aches: none  - Chest/Abdomen: none  - Digestion: good, not decreased  - Bowel Movement/Urination: BM: 1 x day easy to pass feels complete, formed, Urination: input=output  - Hearing/Vision:  none  - Sleep (prior to hospital): uses CPAP at home, falls asleep and stays asleep  - Energy:  1/10 with being here in hosptial  - Emotions: tired  - Ob Gyn: irregular cycle, denies heavy bleeding  - Miscellaneous:  n/a    Traditional Chinese Medicine Assessment  - TONGUE:  pale pink, light moist coat  - PULSE: deep, slippery and tight, pulse very hard to feel on R side  - OBSERVATIONS: patient was slightly sleepy during intake due to her not sleeping well the night before, her voice was quite and she had her eyes closed during most of the intake, when palpating the R foot the patient described discomfort to palpation while palpating the lateral and dorsal aspects of the R foot, foot was visibly swollen on dorasl aspect and around lateral malleolus     Traditional Chinese Medicine Diagnosis  - BRANCH: GB and UB channel obstruction   - ROOT: Spleen Qi Def    Traditional Chinese Medicine Treatment  - ACUPUNCTURE:  Left side only: GB37, UB58, GB34,   -AURICULAR: jimenez men, ankle bilateral  - NEEDLE COUNT: In: 7   Out: 7    Time In: 2:55  - OTHER:  Meditation on [employer] TV channel 47     Post Treatment Assessment  Chief complaint post score:  better  Post Session Observation:  Patient was able to relax and fall asleep during treatment, stated she felt very relaxed after needles were removed and went back to sleep  Patient/Family Education:  Education about pulse taking and diagnosis  Verbal information provided:  yes  Written information provided:  no  All questions answered at time of treatment:  yes    Treatment/Procedure(s) performed by:  Martha Menon    Date: 4/7/2017     *Attestation goes here*

## 2017-04-07 NOTE — PLAN OF CARE
Problem: Goal Outcome Summary  Goal: Goal Outcome Summary  Patient A&O x 4. Neuros and CMS intact with numbness/tinging to RLE. VSS and afebrile. LS clear and BS present in all quadrants. Patient passing flatus. Patient voiding adequate amounts of clear, yellow urine in BSC. Patient states pain is tolerable. Pain meds given as ordered and PRN (see MAR). RLE +3 edema. Left PIV saline locked. Patient is on regular/consistent carb diet and tolerating it well. BG checks q4h, no insulin given at HS per sliding scale, 1 unit given at 0400. CPAP on at night. Patient up ad laly with assist x 1 with cane in room. Patient able to make needs known, will continue to monitor and notify MD of any changes.

## 2017-04-07 NOTE — PLAN OF CARE
Problem: Pain, Acute (Adult)  Goal: Identify Related Risk Factors and Signs and Symptoms  Related risk factors and signs and symptoms are identified upon initiation of Human Response Clinical Practice Guideline (CPG)   Outcome: No Change  Patient is Alert and Ox4.  Pleasant.  Speech clear.  Pedal pulses audible using doppler.  Denies numbness or tingling. VSS.  Refused HCT.  Refused am inhaler, noted audible expiratory wheezing.  Encouraged cough and deep breathing.  Transfers 1A to bedside commode.  Voiding adequately straw colored urine in commode.  Reports passing gas.  Last BM 4/5, per patient has BM every couple of days.  Bowel sounds normo-active.  Diet diab/carb, tolerating well.  No N/V/D.  Pain reported by patient as throbbing to right ankle and foot, especially on outer foot. Noted moderate swelling to right ankle and foot, mild swelling to right leg. Ice packs applied. Patient taking 15mg oxycodone for pain.RLE elevated on pillows when in bed.  Denies Chest pain.  Denies SOB.  Denies numbness or tingling.  Insulin provided per orders.  Verbal with readback with NANETTE Holbrook re: 60U of Novolog at meals following noon  and 4U Novolog provided as correction (see note).  Reviewed s/sx of low BG with patient and to let us know if has sx.  Patient reports will feel sweaty and hands shake if BG is low.  Will continue to monitor.

## 2017-04-07 NOTE — H&P
[facility]     Medicine History and Physical  Pricilla Brown   MRN: [id number]  : 1975  Date of Admission:2017  Primary care provider: Jamilah Bernal [phone]  ___________________________________      Chief Complaint  Right foot pain  Assessment & Plan   Pricilla Brown is a 42 year old White female with a past medical history significant for DMII, Morbid Obesity, Dilated CM, Hypothyroidism, Afib, Asthma, JYOTI who presents with right foot pain    Right Foot Pain: Xray negative for fx, no trauma.  Recent left foot pain that has resolved.  Unable to get MRI 2/2 body girth.  CRP and ESR elevated however they have been elevated in the past.  Exam c/w soft tissue edema, able to do active ROM of ankle which is reassuring that the joint is not grossly involved however cannot be ruled out.  Unable to aspirate overnight, would benefit from aspiration of either joint or possibly rheum can assess subQ as it does appear the extensor tendons are inflamed and tender which could be from a crystal tendonopathy.  Infectious etiology unlikely given this has resolved in the past without ABx.  Will have Podiatry see in the AM, concern in the past for Charcot foot but pain more dorsal and plantar along MT joints.     - Consider colchicine crystal arthropathy a consideration   - Would attempt to aspirate fluid with US if possible   - Uric acid pending   - Podiatry consult   - Pain control    DMII: difficult to control, on U300, will consult endocrine for assistance, appreciate recs   - Endocrine consult   - U-300 160u daily   - High sliding scale   - Victoza    Dilated CM: sees Dr. Lion for cardiology.  Continue home medications.  Currently compensated   - continue torsemide   - continue spironolactone   - continue BB   - continue hydral at 25mg QID    --> Home dose 25/50/50mg     ~Chronic Medical Problems~  # Hypothyroidism: continue synthroid 200mcg daily  # Atrial fibrillation: continue  metorpolol and warfarin  # JYOTI: continue CPAP   # Athsma: continue inhalers    Prophylaxis:     -GI: none    -DVT: already on warfarin    FEN: CCD  Consults: endocrine, podiatry  Code status: full  Disposition: signout in the AM  =========================================================    Asa Adams MD  [facility]  Pager [phone]    History of Present Illness   History is obtained from the patient and review of the EMR.    Pricilla Brown is a 42 year old White female with a past medical history significant for DMII, Morbid Obesity, Dilated CM, Hypothyroidism, Afib, Asthma, JYOTI who presents with right foot pain    Denies trauma, started hurting a few days ago, initially tolerable but has progressively gotten to the point where she cannot stand on it or bear much weight.      Past Medical History    Agree with below  Past Medical History:   Diagnosis Date     A-fib (H) 2011    on coumadin since 1/13     Asthma     as a kid     Chest pain 2/1/2017     Chronic anticoagulation for a-fib 2/15/2013    INR's followed by coumadin clinic at      Diabetes mellitus (H) 2012     Diastolic heart failure 2/15/2013     Dilated cardiomyopathy (H) 1/8/2013     HTN (hypertension)      Hyperthyroidism     Graves, s/p I131 1/13, now on prednisone and methimazole     Morbid obesity (H)      Pulmonary embolism (H) 1/12    hospitalized in [place], on lovenox/coumadin for a few months but stopped, hypercoag w/u neg per pt     Sleep apnea     using CPAP     Past Surgical History   No surgical history    Social History   Social History     Social History     Marital status: Single     Spouse name: N/A     Number of children: N/A     Years of education: N/A     Occupational History     Not on file.     Social History Main Topics     Smoking status: Light Tobacco Smoker     Packs/day: 0.25     Years: 13.00     Types: Cigarettes     Smokeless tobacco: Never Used      Comment: down to 5 cigs     Alcohol use No     Drug use: No      Sexual activity: Yes     Partners: Male     Birth control/ protection: Condom     Other Topics Concern     Not on file     Social History Narrative    Single, no children        Gyn:        Last pap several years ago, no abnormal    No STIs            Patient is single.  She is no longer working.  She used to work in the area of customer service.  She is currently living with her sister in [place], [place].  She has no pets.  Patient has smoked a half pack of cigarettes a day for the past 10 plus years.  She states that she is down to 5 cigarettes a day with the aid of Wellbutrin.  She does not smoke cigars, pipes or chew tobacco.  She has 1 cup of coffee in the morning.  She does not drink alcohol.  Patient does not exercise.     agree with above, still smoking    Family History    Family History   Problem Relation Age of Onset     Thyroid Disease Mother      Grave's D     DIABETES Mother      HEART DISEASE Mother      CEREBROVASCULAR DISEASE Mother      dec. 56 yo     Hypertension Mother      Thyroid Disease Maternal Aunt      HEART DISEASE Sister      Heart Murmur     DIABETES Sister      CANCER No family hx of      Glaucoma No family hx of      Macular Degeneration No family hx of      Thyroid Disease Maternal Uncle      HEART DISEASE Father      dec in his 30s, MI     Psychotic Disorder Brother      Bipolar     DIABETES Brother      Thyroid Disease Brother      Hyper Thyroid     HEART DISEASE Brother      Thyroid Disease Sister      Hyper Thyroid     Thyroid Disease Sister      Hyper Thyroid     Thyroid Disease Sister      Mental Health Problems     Agree with above    Allergies   Allergies   Allergen Reactions     Penicillins Other (See Comments)     CHILDHOOD ALLERGY     Ibuprofen Sodium Hives and Rash    Agree with above    Prior to Admission Medications    Prior to Admission Medications   Prescriptions Last Dose Informant Patient Reported? Taking?   Blood Glucose Monitoring Suppl (ACCU-CHEK  RON PLUS) W/DEVICE KIT 2017 at Unknown time Self No Yes   Sig: Use to test blood sugars 2 times daily or as directed.   DULoxetine (CYMBALTA) 20 MG capsule 2017 at Unknown time Self No Yes   Sig: Take 1 capsule (20 mg) by mouth 2 times daily   Efinaconazole 10 % SOLN Past Month at Unknown time Self No Yes   Sig: Externally apply topically daily To toenails.   JANTOVEN 10 MG tablet 2017 at Unknown time Self Yes Yes   Sig: Take 10 mg by mouth daily as needed   NOVOLOG FLEXPEN 100 UNIT/ML soln 2017 at Unknown time  Yes Yes   Sig: Inject 60 units with meals plus correction. Pt uses approx 180 units in 24 hrs.   ORDER FOR DME  Self No No   Sig: Use your CPAP device as directed by your provider. Pressure change to min 13 max 18cwp   Respiratory Therapy Supplies (NEBULIZER COMPRESSOR) KIT More than a month at Unknown time Self No No   Si Device 4 times daily as needed.   acetaminophen (TYLENOL) 325 MG tablet 2017 at Unknown time  No Yes   Sig: Take 2 tablets (650 mg) by mouth 3 times daily as needed for mild pain or fever (total acetaminophen dose should not exceed 3000 mg per day)   albuterol (PROAIR HFA, PROVENTIL HFA, VENTOLIN HFA) 108 (90 BASE) MCG/ACT inhaler Past Month at Unknown time Self No Yes   Sig: Inhale 2 puffs into the lungs every 6 hours as needed.   bisacodyl (DULCOLAX) 10 MG suppository Past Month at Unknown time Self No Yes   Sig: Place 1 suppository (10 mg) rectally daily as needed for constipation   blood glucose (ACCU-CHEK RON) test strip 2017 at Unknown time Self No Yes   Sig: Use to test blood sugars 2 times daily or as directed.   blood glucose monitoring (ACCU-CHEK FASTCLIX) lancets 2017 at Unknown time  No Yes   Sig: Use to test blood sugar 4 times daily or as directed.   blood glucose monitoring (ACCU-CHEK SMARTVIEW) test strip 2017 at Unknown time  No Yes   Sig: Use to test blood sugar 5 times daily   blood glucose monitoring (ONE TOUCH DELICA) lancets  "4/6/2017 at Unknown time  No Yes   Sig: Use to test blood sugars 4 times daily or as directed.   buPROPion (WELLBUTRIN SR) 100 MG 12 hr tablet Past Month at Unknown time Self No Yes   Sig: Take 1 tablet (100 mg) by mouth 2 times daily   capsaicin (ZOSTRIX) 0.025 % CREA Past Month at Unknown time Self No Yes   Sig: Apply 1 g topically 3 times daily as needed   ciclopirox 8 % SOLN Past Month at Unknown time Self No Yes   Sig: Externally apply topically daily To toenails.   diazepam (VALIUM) 5 MG tablet More than a month at Unknown time  Yes No   Sig: Hand carry to procedure on 2/21. Do not take until instructed.   diclofenac (VOLTAREN) 1 % GEL 4/6/2017 at Unknown time Self No Yes   Sig: Place 4 g onto the skin 4 times daily   gabapentin (NEURONTIN) 300 MG capsule 4/5/2017 at Unknown time Self No Yes   Sig: Take 2 capsules (600 mg) by mouth 2 times daily   hydrALAZINE (APRESOLINE) 25 MG tablet 4/6/2017 at Unknown time Self No Yes   Sig: Take 1 tab (25 mg) in am, 2 tabs (50 mg) midday, and 2 tabs (50 mg) in pm daily   insulin glargine U-300 (TOUJEO) 300 UNIT/ML injection 4/6/2017 at Unknown time  Yes Yes   Sig: Inject 160 units SQ each am.   insulin pen needle (BD RIVER U/F) 32G X 4 MM 4/6/2017 at Unknown time  No Yes   Sig: Use 6 daily or as directed.   insulin pen needle (NOVOFINE) 30G X 8 MM 4/6/2017 at Unknown time Self No Yes   Sig: Use 2X daily or as directed.   insulin syringe-needle U-100 (BD INSULIN SYRINGE ULTRAFINE) 30G X 1/2\" 0.5 ML 4/6/2017 at Unknown time  No Yes   Sig: Use one syringe 3 daily or as directed.   levothyroxine (SYNTHROID/LEVOTHROID) 200 MCG tablet 4/6/2017 at Unknown time Self No Yes   Sig: Take 1 tablet (200 mcg) by mouth daily   liraglutide (VICTOZA PEN) 18 MG/3ML soln Past Week at Unknown time Self No Yes   Sig: Inject 1.8 mg Subcutaneous daily   metoprolol (TOPROL-XL) 50 MG 24 hr tablet Past Month at Unknown time Self No Yes   Sig: Take 0.5 tablets (25 mg) by mouth At Bedtime   morphine " (MS CONTIN) 15 MG 12 hr tablet Past Week at Unknown time Self Yes Yes   Sig: Take 15 mg by mouth daily as needed   nicotine ([employer] NICOTINE) 14 MG/24HR patch 2h hr More than a month at Unknown time Self No No   Sig: Place 1 patch onto the skin every 24 hours   nystatin (MYCOSTATIN) cream Past Month at Unknown time Self No Yes   Sig: Apply topically 2 times daily To toenails   order for DME   No No   Sig: Equipment being ordered: [employer]enger Wide walker if available - patient needs seat, basket and brakes.   order for DME   No No   Sig: Equipment being ordered: [employer] 9338-3704 $92   Plantar, fasciitis, LG, night splint   order for DME   No No   Sig: Left foot   oxyCODONE (ROXICODONE) 10 MG immediate release tablet More than a month at Unknown time Self No No   Sig: Take 1 tablet (10 mg) by mouth every 6 hours as needed for moderate to severe pain   oxyCODONE-acetaminophen (PERCOCET) 5-325 MG per tablet Past Week at Unknown time Self No Yes   Sig: Take 1 tablet by mouth every 8 hours as needed for moderate to severe pain (try to limit use, no further prescriptions until seen in pain clinic)   polyethylene glycol (MIRALAX/GLYCOLAX) powder Past Month at Unknown time Self Yes Yes   potassium chloride SA (POTASSIUM CHLORIDE) 20 MEQ CR tablet 4/5/2017 at Unknown time  No Yes   Sig: Take 2 tablets (40 mEq) by mouth 4 times daily   senna (SENOKOT) 8.6 MG tablet Past Month at Unknown time Self No Yes   Sig: Take 1 tablet by mouth 2 times daily   spironolactone (ALDACTONE) 50 MG tablet Past Month at Unknown time Self No Yes   Sig: Take 1 tablet (50 mg) by mouth daily   tiotropium (SPIRIVA HANDIHALER) 18 MCG inhalation capsule More than a month at Unknown time Self No No   Sig: Inhale contents of one capsule daily.   topiramate (TOPAMAX) 25 MG tablet Past Week at Unknown time Self No Yes   Sig: Take 3 tablets (75 mg) by mouth 2 times daily   torsemide (DEMADEX) 100 MG tablet Past Week at Unknown time  No Yes   Sig: Take 1 tablet  (100 mg) by mouth 2 times daily   warfarin (COUMADIN) 7.5 MG tablet 4/5/2017 at Unknown time Self No Yes   Sig: Take 15 mg PO daily on 10/20, 10/21, 10/22. Check INR on 10/23. Further dosing per INR level.      Facility-Administered Medications: None     Review of Systems   Please see HPI. All other systems were reviewed and are found to be negative and non-contributory.     Physical Exam   Vital Signs with Ranges  Temp:  [98  F (36.7  C)-98.7  F (37.1  C)] 98.6  F (37  C)  Pulse:  [75-86] 86  Resp:  [18-20] 18  BP: (126-146)/(61-88) 128/61  SpO2:  [96 %-100 %] 96 %     Wt Readings from Last 1 Encounters:   04/06/17 (!) 219.5 kg (484 lb)    Body mass index is 75.79 kg/(m^2). Resp: 18    General: Alert, oriented, cooperative, no apparent distress, appears nontoxic  Eyes: Eyes are clear, pupils are reactive.  HEENT: Oropharynx is clear and moist. No evidence of cranial trauma.  Lymph/Hematologic: No LAD appreciated  Cardiovascular: Irreg Irreg, and no murmur noted. JVP is normal. Good peripheral pulses in wrists bilaterally.  Respiratory: No wheezes, decreased air movement  GI: obese, NABS, no tenderness, +soft  Genitourinary: Deferred  Musculoskeletal: Normal muscle bulk and tone. Right Foot > left Foot, right foot tenderness on dorsum of foot along extensor tendons with subQ fluid, also mild joint effusion as well but normal ROM but painful.  Unable to bear weight, not grossly warm  Skin: Warm and dry, no rashes.   Neurologic: Neck supple. Cranial nerves are grossly intact.  is symmetric.     Data   Labs & Studies of Note: I personally reviewed the following studies:    ROUTINE ICU LABS (Last four results)  CMP  Recent Labs  Lab 04/06/17  1909      POTASSIUM 3.9   CHLORIDE 107   CO2 28   ANIONGAP 6   *   BUN 12   CR 0.92   GFRESTIMATED 67   GFRESTBLACK 81   JUSTIN 8.9     CBC  Recent Labs  Lab 04/06/17  1909   WBC 12.1*   RBC 4.08   HGB 12.2   HCT 37.2   MCV 91   MCH 29.9   MCHC 32.8   RDW 14.4   PLT  337     INR  Recent Labs  Lab 04/06/17  1909   INR 2.94*       No intake or output data in the 24 hours ending 04/06/17 2334    Microbiology:  Significant culture results:  none  Culture Micro   Date Value Ref Range Status   08/26/2014   Final    Canceled, Test credited Incorrectly ordered by PCU/Clinic REORDERED AS AN ABSCESS   CULTURE     08/26/2014 (A)  Final    Heavy growth Staphylococcus aureus  Heavy growth Streptococcus anginosus group  Moderate growth Beta hemolytic Streptococcus group B     08/26/2014 (A)  Final    Heavy growth Beta hemolytic Streptococcus, not group A     Recent Labs   Lab Test  08/26/14   1630  08/26/14   1325  03/25/13   1118   CULT  Heavy growth Staphylococcus aureus  Heavy growth Streptococcus anginosus group  Moderate growth Beta hemolytic Streptococcus group B  *  Canceled, Test credited Incorrectly ordered by PCU/Clinic REORDERED AS AN ABSCESS   CULTURE    Heavy growth Beta hemolytic Streptococcus, not group A*   --    SDES  Abscess Tonsil  Abscess Tonsil  Abscess Tonsil  Throat  Throat  Vagina       Urine Studies:    Recent Labs   Lab Test  01/28/14   1206  05/01/13   2024  01/06/13   1220   LEUKEST  Negative  Negative  Negative   NITRITE  Negative  Negative  Negative   WBCU  0   --   <1   RBCU  1   --   <1       Imaging:   Recent Results (from the past 24 hour(s))   US Lower Extremity Venous Duplex Right    Narrative    ULTRASOUND VENOUS RIGHT LOWER EXTREMITY WITH DOPPLER   4/6/2017 7:51  PM     HISTORY: Right leg pain and swelling.    COMPARISON: None.    TECHNIQUE: Spectral waveform and color Doppler evaluation were  performed.    FINDINGS: Note that the calf veins are not visualized in entirety due  to large body habitus. Normal compressibility and unremarkable Doppler  waveform evaluation of the right common femoral, femoral, popliteal  and posterior tibial veins. Edema is present in the soft tissues of  the right foot.      Impression    IMPRESSION: No evidence of  thrombus in the major veins of the right  lower extremity.    CLARI LEMUS MD   Foot  XR, G/E 3 views, right    Narrative    FOOT RIGHT THREE OR MORE VIEWS  4/6/2017 7:56 PM     COMPARISON: None    HISTORY: Pain - evaluate for fracture.      Impression    IMPRESSION: There is marked soft tissue swelling throughout the right  foot. There is no evidence for fracture or dislocation of the right  foot.    DICK PETERSON MD

## 2017-04-07 NOTE — PROVIDER NOTIFICATION
Sent text page to MITA Holbrook PA re: new order for 60U of Novolog with meals.  Noon , 4U Novolog correction.  Asking for clarification that ok to administer.    1238pm Spoke with NANETTE Holbrook.  Stated she spoke with Endocrine and to give a total of 60U for lunch, so give 56U Novolog now.  Verbal read back, confirmed.

## 2017-04-08 VITALS
HEART RATE: 81 BPM | WEIGHT: 293 LBS | RESPIRATION RATE: 18 BRPM | BODY MASS INDEX: 75.79 KG/M2 | OXYGEN SATURATION: 97 % | DIASTOLIC BLOOD PRESSURE: 54 MMHG | SYSTOLIC BLOOD PRESSURE: 146 MMHG | TEMPERATURE: 98.1 F

## 2017-04-08 LAB
ANION GAP SERPL CALCULATED.3IONS-SCNC: 7 MMOL/L (ref 3–14)
BUN SERPL-MCNC: 16 MG/DL (ref 7–30)
CALCIUM SERPL-MCNC: 8.6 MG/DL (ref 8.5–10.1)
CHLORIDE SERPL-SCNC: 104 MMOL/L (ref 94–109)
CO2 SERPL-SCNC: 26 MMOL/L (ref 20–32)
CREAT SERPL-MCNC: 1.01 MG/DL (ref 0.52–1.04)
ERYTHROCYTE [DISTWIDTH] IN BLOOD BY AUTOMATED COUNT: 14.4 % (ref 10–15)
FACT X ACT/NOR PPP CHRO: 19 % (ref 70–130)
GFR SERPL CREATININE-BSD FRML MDRD: 60 ML/MIN/1.7M2
GLUCOSE BLDC GLUCOMTR-MCNC: 105 MG/DL (ref 70–99)
GLUCOSE BLDC GLUCOMTR-MCNC: 115 MG/DL (ref 70–99)
GLUCOSE BLDC GLUCOMTR-MCNC: 123 MG/DL (ref 70–99)
GLUCOSE BLDC GLUCOMTR-MCNC: 86 MG/DL (ref 70–99)
GLUCOSE SERPL-MCNC: 116 MG/DL (ref 70–99)
HCT VFR BLD AUTO: 39.9 % (ref 35–47)
HGB BLD-MCNC: 12.8 G/DL (ref 11.7–15.7)
INR PPP: 2.89 (ref 0.86–1.14)
MCH RBC QN AUTO: 29.6 PG (ref 26.5–33)
MCHC RBC AUTO-ENTMCNC: 32.1 G/DL (ref 31.5–36.5)
MCV RBC AUTO: 92 FL (ref 78–100)
PLATELET # BLD AUTO: 339 10E9/L (ref 150–450)
POTASSIUM SERPL-SCNC: 4 MMOL/L (ref 3.4–5.3)
RBC # BLD AUTO: 4.33 10E12/L (ref 3.8–5.2)
SODIUM SERPL-SCNC: 137 MMOL/L (ref 133–144)
WBC # BLD AUTO: 13.5 10E9/L (ref 4–11)

## 2017-04-08 PROCEDURE — A9270 NON-COVERED ITEM OR SERVICE: HCPCS | Mod: GY | Performed by: STUDENT IN AN ORGANIZED HEALTH CARE EDUCATION/TRAINING PROGRAM

## 2017-04-08 PROCEDURE — 85027 COMPLETE CBC AUTOMATED: CPT | Performed by: INTERNAL MEDICINE

## 2017-04-08 PROCEDURE — 36415 COLL VENOUS BLD VENIPUNCTURE: CPT | Performed by: INTERNAL MEDICINE

## 2017-04-08 PROCEDURE — 25000132 ZZH RX MED GY IP 250 OP 250 PS 637: Mod: GY | Performed by: STUDENT IN AN ORGANIZED HEALTH CARE EDUCATION/TRAINING PROGRAM

## 2017-04-08 PROCEDURE — 85260 CLOT FACTOR X STUART-POWER: CPT | Performed by: INTERNAL MEDICINE

## 2017-04-08 PROCEDURE — 94660 CPAP INITIATION&MGMT: CPT

## 2017-04-08 PROCEDURE — 99207 ZZC APP CREDIT; MD BILLING SHARED VISIT: CPT | Performed by: PHYSICIAN ASSISTANT

## 2017-04-08 PROCEDURE — 00000146 ZZHCL STATISTIC GLUCOSE BY METER IP

## 2017-04-08 PROCEDURE — 80048 BASIC METABOLIC PNL TOTAL CA: CPT | Performed by: INTERNAL MEDICINE

## 2017-04-08 PROCEDURE — 85610 PROTHROMBIN TIME: CPT | Performed by: INTERNAL MEDICINE

## 2017-04-08 PROCEDURE — 99239 HOSP IP/OBS DSCHRG MGMT >30: CPT | Performed by: INTERNAL MEDICINE

## 2017-04-08 RX ORDER — WARFARIN SODIUM 5 MG/1
10 TABLET ORAL ONCE
Qty: 30 TABLET | Refills: 0 | Status: SHIPPED | OUTPATIENT
Start: 2017-04-08 | End: 2017-04-08

## 2017-04-08 RX ORDER — OXYCODONE HYDROCHLORIDE 10 MG/1
10 TABLET ORAL
Qty: 30 TABLET | Refills: 0 | Status: SHIPPED | OUTPATIENT
Start: 2017-04-08 | End: 2017-04-08

## 2017-04-08 RX ORDER — WARFARIN SODIUM 5 MG/1
10 TABLET ORAL
Status: DISCONTINUED | OUTPATIENT
Start: 2017-04-08 | End: 2017-04-08 | Stop reason: HOSPADM

## 2017-04-08 RX ORDER — OXYCODONE HYDROCHLORIDE 10 MG/1
10-15 TABLET ORAL
Qty: 30 TABLET | Refills: 0 | Status: SHIPPED | OUTPATIENT
Start: 2017-04-08 | End: 2017-05-31

## 2017-04-08 RX ADMIN — TOPIRAMATE 75 MG: 25 TABLET, FILM COATED ORAL at 09:01

## 2017-04-08 RX ADMIN — SENNOSIDES 1 TABLET: 8.6 TABLET, FILM COATED ORAL at 09:02

## 2017-04-08 RX ADMIN — POTASSIUM CHLORIDE 40 MEQ: 750 TABLET, FILM COATED, EXTENDED RELEASE ORAL at 09:00

## 2017-04-08 RX ADMIN — OXYCODONE HYDROCHLORIDE 15 MG: 5 TABLET ORAL at 03:53

## 2017-04-08 RX ADMIN — POTASSIUM CHLORIDE 40 MEQ: 750 TABLET, FILM COATED, EXTENDED RELEASE ORAL at 12:22

## 2017-04-08 RX ADMIN — TORSEMIDE 100 MG: 100 TABLET ORAL at 09:00

## 2017-04-08 RX ADMIN — OXYCODONE HYDROCHLORIDE 15 MG: 5 TABLET ORAL at 10:13

## 2017-04-08 RX ADMIN — RANITIDINE HYDROCHLORIDE 150 MG: 150 TABLET, FILM COATED ORAL at 09:01

## 2017-04-08 RX ADMIN — OXYCODONE HYDROCHLORIDE 15 MG: 5 TABLET ORAL at 06:51

## 2017-04-08 RX ADMIN — OXYCODONE HYDROCHLORIDE 15 MG: 5 TABLET ORAL at 13:12

## 2017-04-08 RX ADMIN — SPIRONOLACTONE 50 MG: 25 TABLET ORAL at 09:01

## 2017-04-08 RX ADMIN — POLYETHYLENE GLYCOL 3350 17 G: 17 POWDER, FOR SOLUTION ORAL at 09:04

## 2017-04-08 RX ADMIN — GABAPENTIN 600 MG: 300 CAPSULE ORAL at 09:01

## 2017-04-08 RX ADMIN — LEVOTHYROXINE SODIUM 200 MCG: 200 TABLET ORAL at 09:02

## 2017-04-08 RX ADMIN — BUPROPION HYDROCHLORIDE 100 MG: 100 TABLET, FILM COATED, EXTENDED RELEASE ORAL at 09:00

## 2017-04-08 RX ADMIN — DULOXETINE 20 MG: 20 CAPSULE, DELAYED RELEASE PELLETS ORAL at 09:00

## 2017-04-08 RX ADMIN — OXYCODONE HYDROCHLORIDE 15 MG: 5 TABLET ORAL at 00:35

## 2017-04-08 RX ADMIN — INSULIN GLARGINE 160 UNITS: 300 INJECTION, SOLUTION SUBCUTANEOUS at 09:09

## 2017-04-08 NOTE — PLAN OF CARE
Problem: Goal Outcome Summary  Goal: Goal Outcome Summary  Outcome: Improving  Patient is Alert and Ox4.  Pleasant.  Speech clear.  Denies numbness or tingling. VSS. Refused Antihypertensive PO med and inhaler. Transfers standby 1A with cane, right toe touch.  Voiding adequately straw colored urine in bedside commode.  Reports passing gas.  Last BM 4/6.  Bowel sounds normo-active.  RLE wrap in place, no c/o discomfort.  Toes warm, no discoloration.  Diabetic diet, tolerating well.  No N/V/D.  Pain reported by patient as throbbing to right foot. Patient taking 15mg oxycodone for pain.  Denies Chest pain  Denies SOB.  Will continue to monitor.  Discharge orders in place.  Awaiting medications.  Writer spoke with NANETTE Holbrook, no home care needed at home.  To follow up with podiatry on Monday.  Will continue to monitor.

## 2017-04-08 NOTE — PLAN OF CARE
"Problem: Goal Outcome Summary  Goal: Goal Outcome Summary  Outcome: Improving  Alert, oriented. VSS, afebrile. Lungs dim in bases with intermittent exp wheezing, prn neb given with good relief. Refusing ventolin inhaler - \"I prefer the neb treatments\" Denies CP or feeling LH. CMS intact - denies numbness or tingling. 2+ edema to LEs, 3+ edema to right foot. C/o aching, constant pain in right foot. PRN oxycodone 15 mg given q 3 hrs. Tylenol 650 mg given once. MD applied compression wrap/boot to right foot - instructed by MD to cut off if becomes too tight but pt has tolerated without issue. Up with one assist to commode. Urinating adequate amounts without difficulty. Pt states she had a BM earlier today. BS active, passing gas. PIV is SL. Blood sugars stable. Good appetite. Calls appropriately and makes needs known      "

## 2017-04-08 NOTE — PLAN OF CARE
Problem: Goal Outcome Summary  Goal: Goal Outcome Summary  Outcome: No Change  Pt A/O X 4. Afebrile. VSS. Awake with occasional pain and awake with CPAP beeping. Respiratory therapy brought up new CPAP at 0400, patient slept after that. Lungs- clear with expiratory wheezes, bilaterally. IS encouraged. CMS and Neuro's are intact. Denies numbness and tingling in all extremities. Denies nausea, shortness of breath, and chest pain. Has pain in the right foot/lower leg and given PRN oxycodone 15mg, ICE applied, and is tolerating well. Edema 3+ right right lower extremity. Compression boot in place and RLE elevated. Voids spontaneously without difficulty in the bedside commode. Is on a CHO diet, no meal this shift. Bowels- audible and active in all four quadrants, is passing flatus, last BM 4/7 per patient report. Pt up assist of 1 with cane. PIV is patent in the left hand and saline locked. Pt is able to make needs known and the call light is within the pt's reach. Continue to monitor.

## 2017-04-08 NOTE — CONSULTS
Date of Service: 4/6/2017    Chief Complaint:   Chief Complaint   Patient presents with     Foot Pain     Has pain and swelling in right foot.  Has noted this for the past 2-3 days.  Now unable to get sock on.         HPI: Pricilla is a 42 year old female who was seen bedside today for further evaluation of painful and swollen right foot, ankle, and leg. She relates that this started around Tuesday of this week. She does relate that she started cardiac rehab on Monday to strengthen her heart. She relates to working very hard, harder than usual, in the rehab session and noted soreness of her legs that night. She relates that the swelling and pain started the next day. The pain was so intense for her that she was not able to walk and so presented to the ED. She is able to put some weight on it today and has decreased pain today.  She relates that she has had some difficulty breathing since her swelling started in the leg. Since being admitted, she has had an xray, which showed no acute processes noted.   She is diabetic and there has been a past concern with Charcot. She has had episodes like this in the past on the left leg. These were treated by my colleague, Dr. Jean, who treated her with Unna boots and had full resolution.     Review of Systems: No N/V/D/F/C/NS/CP. Some SOB and dyspnea at this visit.     PMH:   Past Medical History:   Diagnosis Date     A-fib (H) 2011    on coumadin since 1/13     Asthma     as a kid     Chest pain 2/1/2017     Chronic anticoagulation for a-fib 2/15/2013    INR's followed by coumadin clinic at      Diabetes mellitus (H) 2012     Diastolic heart failure 2/15/2013     Dilated cardiomyopathy (H) 1/8/2013     HTN (hypertension)      Hyperthyroidism     Graves, s/p I131 1/13, now on prednisone and methimazole     Morbid obesity (H)      Pulmonary embolism (H) 1/12    hospitalized in [place], on lovenox/coumadin for a few months but stopped, hypercoag w/u neg per pt     Sleep apnea      using CPAP       PSxH: No past surgical history on file.    Allergies: Penicillins and Ibuprofen sodium    SH:   Social History     Social History     Marital status: Single     Spouse name: N/A     Number of children: N/A     Years of education: N/A     Occupational History     Not on file.     Social History Main Topics     Smoking status: Light Tobacco Smoker     Packs/day: 0.25     Years: 13.00     Types: Cigarettes     Smokeless tobacco: Never Used      Comment: down to 5 cigs     Alcohol use No     Drug use: No     Sexual activity: Yes     Partners: Male     Birth control/ protection: Condom     Other Topics Concern     Not on file     Social History Narrative    Single, no children        Gyn:        Last pap several years ago, no abnormal    No STIs            Patient is single.  She is no longer working.  She used to work in the area of customer service.  She is currently living with her sister in [place], [place].  She has no pets.  Patient has smoked a half pack of cigarettes a day for the past 10 plus years.  She states that she is down to 5 cigarettes a day with the aid of Wellbutrin.  She does not smoke cigars, pipes or chew tobacco.  She has 1 cup of coffee in the morning.  She does not drink alcohol.  Patient does not exercise.        FH:   Family History   Problem Relation Age of Onset     Thyroid Disease Mother      Grave's D     DIABETES Mother      HEART DISEASE Mother      CEREBROVASCULAR DISEASE Mother      dec. 56 yo     Hypertension Mother      Thyroid Disease Maternal Aunt      HEART DISEASE Sister      Heart Murmur     DIABETES Sister      CANCER No family hx of      Glaucoma No family hx of      Macular Degeneration No family hx of      Thyroid Disease Maternal Uncle      HEART DISEASE Father      dec in his 30s, [place]     Psychotic Disorder Brother      Bipolar     DIABETES Brother      Thyroid Disease Brother      Hyper Thyroid     HEART DISEASE Brother      Thyroid Disease  Sister      Hyper Thyroid     Thyroid Disease Sister      Hyper Thyroid     Thyroid Disease Sister      Mental Health Problems       Objective:  97.5 80 18 143/81 Data Unavailable 484 lbs 0 oz    PT and DP pulses are non-palpable bilaterally. CRT is 3 seconds. Diminished pedal hair. There is some moderate edema noted to the right foot, ankle, and leg. Today, this appears similar to the contralateral side. The edema is not pitting or brawny, suggesting that this has not been longstanding. The skin is not shiny and does have normal texture. No open lesions are noted. There is no warmth to the right foot. On the contrary, the leg is actually cold. There is no erythema noted to the legs.   Gross sensation is diminished bilaterally.   Equinus is noted bilaterally. No pain with active or passive ROM of the ankle, MTJ, 1st ray, or halluces bilaterally. MMT 5/5 on the right. No crepitus noted with dorsal foot palpation.  Nails normal bilaterally. No open lesions are noted.     No x-rays indicated during today's visit  Previous films were reviewed today, independent visualization of images was performed, and results were discussed with the patient    Assessment: Unilateral LE edema - is resolving some before any interventions. Possibly exercise induced.     Plan:  - Pt seen and evaluated  - I discussed the xrays with her.  - I have low suspicision for infection at this time. There is no breach in the skin, and no warmth or erythema to the skin on the feet, ankle, or leg.   - Her leg today was wrapped with a minimally compressive, valved unna boot. I spoke to IM and to her nurse about these. If she cannot tolerate them, OK to take off, however I would like her to keep them on until at least Monday. She has tolerated these in the past. At this point, from my perspective, her compressive therapy can be managed as an outpatient. If she is still in-house on Monday, our team can change the boot and add slightly more compression.  If discharged over the weekend. She can call [phone] to make an outpatient appointment for monitoring.

## 2017-04-08 NOTE — DISCHARGE SUMMARY
[facility]  Discharge Summary    Pricilla Brown MRN# [id number]   YOB: 1975 Age: 42 year old     Date of Admission:  4/6/2017  Date of Discharge:  4/8/2017  Admitting Physician:  Nathan Ellis MD  Discharge Physician:  Nina Valdivia PA-C (Contact: [phone])  Discharging Service:  Internal Medicine     Primary Provider: Jamilah Bernal          Brief History of Illness:   Pricilla Brown is a 42 year old female with history of DMII, morbid obesity, dilated CM, hypothyroidism, Afib, asthma, and JYOTI who was admitted with right foot pain.     Seen by podiatry this admission. Stabilized and discharged home, see below          Primary care provider to do:   Follow up after hospital discharge: CBC, BMP, CRP, ESR  Follow up on anticoagulation dosing and INRs  Follow up on podiatry recs           Discharge Diagnosis:   Right foot tenderness and edema  Dilated CM  DMII  Pulmonary HTN  HTN  A fib  JYOTI  Asthma  Hypothyroidism              Discharge Disposition:   Discharged to home           Condition on Discharge:   Discharge condition: Stable   Code status on discharge: Full Code           Procedures:   Lower extremity US 4/6  No evidence of thrombus in the major veins of the right lower extremity.    Foot XR 4/6  here is marked soft tissue swelling throughout the right foot. There is no evidence for fracture or dislocation of the right foot.          Discharge Medications:     Current Discharge Medication List      CONTINUE these medications which have CHANGED    Details   oxyCODONE (ROXICODONE) 10 MG IR tablet Take 1-1.5 tablets (10-15 mg) by mouth every 3 hours as needed for moderate to severe pain  Qty: 30 tablet, Refills: 0    Associated Diagnoses: Neuropathic pain of lower extremity, unspecified laterality; Left foot pain      warfarin (COUMADIN) 5 MG tablet Take 2 tablets (10 mg) by mouth once for 1 dose Take 15 mg PO daily on 10/20, 10/21, 10/22. Check INR on  10/23. Further dosing per INR level.  Qty: 30 tablet, Refills: 0    Comments: Take 10 mg 4/8, then resume PTA dosing on 4/9  Associated Diagnoses: Long-term (current) use of anticoagulants         CONTINUE these medications which have NOT CHANGED    Details   potassium chloride SA (POTASSIUM CHLORIDE) 20 MEQ CR tablet Take 2 tablets (40 mEq) by mouth 4 times daily  Qty: 240 tablet, Refills: 3    Associated Diagnoses: Hypokalemia      !! blood glucose monitoring (ACCU-CHEK FASTCLIX) lancets Use to test blood sugar 4 times daily or as directed.  Qty: 4 Box, Refills: 2    Associated Diagnoses: Type 2 diabetes mellitus with hyperglycemia, with long-term current use of insulin (H)      !! blood glucose monitoring (ACCU-CHEK SMARTVIEW) test strip Use to test blood sugar 5 times daily  Qty: 450 each, Refills: 3    Associated Diagnoses: Type 2 diabetes mellitus with hyperglycemia, with long-term current use of insulin (H)      torsemide (DEMADEX) 100 MG tablet Take 1 tablet (100 mg) by mouth 2 times daily  Qty: 180 tablet, Refills: 1    Associated Diagnoses: Chronic diastolic heart failure (H)      insulin glargine U-300 (TOUJEO) 300 UNIT/ML injection Inject 160 units SQ each am.  Qty: 15 mL, Refills: 3      NOVOLOG FLEXPEN 100 UNIT/ML soln Inject 60 units with meals plus correction. Pt uses approx 180 units in 24 hrs.  Qty: 60 mL, Refills: 11      polyethylene glycol (MIRALAX/GLYCOLAX) powder       topiramate (TOPAMAX) 25 MG tablet Take 3 tablets (75 mg) by mouth 2 times daily  Qty: 180 tablet, Refills: 3    Associated Diagnoses: Morbid obesity, unspecified obesity type (H)      acetaminophen (TYLENOL) 325 MG tablet Take 2 tablets (650 mg) by mouth 3 times daily as needed for mild pain or fever (total acetaminophen dose should not exceed 3000 mg per day)  Qty: 60 tablet, Refills: 0    Associated Diagnoses: Neuropathic pain of lower extremity, unspecified laterality      levothyroxine (SYNTHROID/LEVOTHROID) 200 MCG tablet  Take 1 tablet (200 mcg) by mouth daily  Qty: 90 tablet, Refills: 3    Associated Diagnoses: Hypothyroidism      morphine (MS CONTIN) 15 MG 12 hr tablet Take 15 mg by mouth daily as needed      buPROPion (WELLBUTRIN SR) 100 MG 12 hr tablet Take 1 tablet (100 mg) by mouth 2 times daily  Qty: 60 tablet, Refills: 3    Associated Diagnoses: Tobacco abuse      hydrALAZINE (APRESOLINE) 25 MG tablet Take 1 tab (25 mg) in am, 2 tabs (50 mg) midday, and 2 tabs (50 mg) in pm daily  Qty: 150 tablet, Refills: 3    Associated Diagnoses: Dilated cardiomyopathy (H)      liraglutide (VICTOZA PEN) 18 MG/3ML soln Inject 1.8 mg Subcutaneous daily  Qty: 27 mL, Refills: 3    Associated Diagnoses: Diabetes mellitus type 1 (H)      diclofenac (VOLTAREN) 1 % GEL Place 4 g onto the skin 4 times daily  Qty: 100 g, Refills: 0    Associated Diagnoses: Left foot pain      metoprolol (TOPROL-XL) 50 MG 24 hr tablet Take 0.5 tablets (25 mg) by mouth At Bedtime  Qty: 90 tablet, Refills: 3    Associated Diagnoses: Chronic diastolic heart failure (H)      capsaicin (ZOSTRIX) 0.025 % CREA Apply 1 g topically 3 times daily as needed  Qty: 1 Tube, Refills: 0    Associated Diagnoses: Dermatophytosis of nail      !! insulin pen needle (BD RIVER U/F) 32G X 4 MM Use 6 daily or as directed.  Qty: 200 each, Refills: 11    Associated Diagnoses: Diabetes mellitus, type 2 (H)      ciclopirox 8 % SOLN Externally apply topically daily To toenails.  Qty: 1 Bottle, Refills: 11    Associated Diagnoses: Dermatophytosis of nail      nystatin (MYCOSTATIN) cream Apply topically 2 times daily To toenails  Qty: 90 g, Refills: 6    Associated Diagnoses: Dermatophytosis of nail      Efinaconazole 10 % SOLN Externally apply topically daily To toenails.  Qty: 8 mL, Refills: 11    Associated Diagnoses: Dermatophytosis of nail      !! insulin pen needle (NOVOFINE) 30G X 8 MM Use 2X daily or as directed.  Qty: 100 each, Refills: 3    Associated Diagnoses: Type 2 diabetes mellitus  "with hyperglycemia (H)      gabapentin (NEURONTIN) 300 MG capsule Take 2 capsules (600 mg) by mouth 2 times daily  Qty: 120 capsule, Refills: 5    Associated Diagnoses: Lumbago      spironolactone (ALDACTONE) 50 MG tablet Take 1 tablet (50 mg) by mouth daily  Qty: 30 tablet, Refills: 11      insulin syringe-needle U-100 (BD INSULIN SYRINGE ULTRAFINE) 30G X 1/2\" 0.5 ML Use one syringe 3 daily or as directed.  Qty: 100 each, Refills: prn    Associated Diagnoses: Diabetes mellitus, type 2 (H)      !! blood glucose monitoring (ONE TOUCH DELICA) lancets Use to test blood sugars 4 times daily or as directed.  Qty: 1 Box, Refills: prn    Associated Diagnoses: Diabetes mellitus, type 2 (H)      oxyCODONE-acetaminophen (PERCOCET) 5-325 MG per tablet Take 1 tablet by mouth every 8 hours as needed for moderate to severe pain (try to limit use, no further prescriptions until seen in pain clinic)  Qty: 60 tablet, Refills: 0    Associated Diagnoses: Lumbago; Bilateral low back pain with sciatica, sciatica laterality unspecified      !! blood glucose (ACCU-CHEK RON) test strip Use to test blood sugars 2 times daily or as directed.  Qty: 100 strip, Refills: 11    Associated Diagnoses: Type II or unspecified type diabetes mellitus without mention of complication, not stated as uncontrolled      DULoxetine (CYMBALTA) 20 MG capsule Take 1 capsule (20 mg) by mouth 2 times daily  Qty: 60 capsule, Refills: 0    Associated Diagnoses: Depression      senna (SENOKOT) 8.6 MG tablet Take 1 tablet by mouth 2 times daily  Qty: 45 tablet, Refills: 0    Associated Diagnoses: Constipation      bisacodyl (DULCOLAX) 10 MG suppository Place 1 suppository (10 mg) rectally daily as needed for constipation  Qty: 6 suppository, Refills: 0    Associated Diagnoses: Constipation      Blood Glucose Monitoring Suppl (ACCU-CHEK RON PLUS) W/DEVICE KIT Use to test blood sugars 2 times daily or as directed.  Qty: 1 kit, Refills: 0    Associated Diagnoses: " Type II or unspecified type diabetes mellitus without mention of complication, not stated as uncontrolled      albuterol (PROAIR HFA, PROVENTIL HFA, VENTOLIN HFA) 108 (90 BASE) MCG/ACT inhaler Inhale 2 puffs into the lungs every 6 hours as needed.  Qty: 1 Inhaler, Refills: 11    Associated Diagnoses: Reactive airway disease      diazepam (VALIUM) 5 MG tablet Hand carry to procedure on 2/21. Do not take until instructed.  Qty: 1 tablet, Refills: 0    Associated Diagnoses: Claustrophobia      !! order for DME Left foot  Qty: 1 Units, Refills: 0    Associated Diagnoses: Left foot pain; Diabetic neuropathy with neurologic complication (H)      !! order for DME Equipment being ordered: [id number] $92   Plantar, fasciitis, LG, night splint  Qty: 1 each, Refills: 0    Associated Diagnoses: Dermatophytosis of nail; Plantar fasciitis of right foot; Diabetic neuropathy with neurologic complication (H); Peroneal tendinitis, right      !! order for DME Equipment being ordered: Challenger Wide walker if available - patient needs seat, basket and brakes.  Qty: 1 each, Refills: 0    Associated Diagnoses: Dilated cardiomyopathy (H); Chronic systolic heart failure (H)      nicotine ([employer] NICOTINE) 14 MG/24HR patch 2h hr Place 1 patch onto the skin every 24 hours  Qty: 30 patch, Refills: 1    Associated Diagnoses: Tobacco abuse      tiotropium (SPIRIVA HANDIHALER) 18 MCG inhalation capsule Inhale contents of one capsule daily.  Qty: 30 capsule, Refills: 1    Associated Diagnoses: COPD (chronic obstructive pulmonary disease) (H)      Respiratory Therapy Supplies (NEBULIZER COMPRESSOR) KIT 1 Device 4 times daily as needed.  Qty: 1 kit, Refills: 3    Associated Diagnoses: COPD (chronic obstructive pulmonary disease) (H)      !! ORDER FOR DME Use your CPAP device as directed by your provider. Pressure change to min 13 max 18cwp  Qty: 1 each, Refills: 99       !! - Potential duplicate medications found. Please discuss with provider.                 Consultations:   Consultation during this admission received from podiatry, RT.              Hospital Course:   Pricilla Brown is a 42 year old female with history of DMII, morbid obesity, dilated CM, hypothyroidism, Afib, asthma, and JYOTI who was admitted with right foot pain.      Right foot tenderness and edema: Presented with 1 week right foot swelling and pain with movement. Denies trauma. Similar episode of the left foot 10/2016, improved with soft boot, no surgical intervention. No infection found at that time. XR 4/6 negative. Unable to get MRI 2/2 body habitus. Ortho did not think CT would be beneficial. Unable to aspirate on admission. Exam c/w soft tissue edema, able to do active ROM of ankle which is reassuring that the joint is not grossly involved however cannot be ruled out. Does appear the extensor tendons are inflamed and tender which could be from a crystal tendonopathy. Infectious etiology unlikely given this resolved in the past without ABx. CRP 82.4 (84), but has been intermittently elevated in the past. Uric acid 7.4. WBC 13.5 (11.1) on discharge. Afebrile. Procal negative. Foot less swollen, no edema on discharge. Tenderness stable to slightly improved.   - Podiatry consulted, wrapped foot up to the knee. Suggested follow up OP on 4/10. Patient is to call [phone] to schedule   - Continue Diclofenac gel, gabapentin, oxycodone prn on discharge      DMII: Complex PTA regimen includes Victoza, Glargine U300, and Novolog 60 units with meals. Hemoglobin A1c stable at 9.0% (8.8). Last seen OP by endocrinology 2/2017, at which time no changes were made to regimen. Of note, does not tolerate Metformin   - Glucose stable on discharge  - Patient discharged on PTA meds     Dilated CM: Follows OP with Dr. Lion of cardiology. PTA on torsemide, spironolactone, BB, hydralazine. Currently compensated.  - Continued PTA meds  - Follow up with cardiology as indicated     HTN: PTA on meds as  above. BPs stable throughout admission  - Continued PTA regimen      Pulmonary HTN: PTA on Coumadin and meds as above. INR goal 2-2.5 per latest notes. INR 2.89   - Patient will discharge on 10 mg coumadin 4/8, then resume PTA dosing 4/9  - follow up with INR clinic on 4/10      Chronic Medical Problems  Hypothyroidism: Last TSH 0.75 2/13/17, PTA on synthroid 200mcg daily, continue  - Checking INR and chromogenic X  Atrial fibrillation: Rate controlled. PTA on metorpolol and warfarin, continue  JYOTI: PTA uses CPAP, but did not bring with her  Athsma: Stable without recent flare, continue inhalers            Final Day of Progress before Discharge:       Physical Exam:  Blood pressure 127/72, pulse 81, temperature 98.1  F (36.7  C), temperature source Oral, resp. rate 18, weight (!) 219.5 kg (484 lb), SpO2 97 %, not currently breastfeeding.  GENERAL: Alert and oriented x 3. NAD.   HEENT: Anicteric sclera. PERRL. Mucous membranes moist and without lesions.   CV: RRR. S1, S2. No murmurs appreciated.   RESPIRATORY: Effort normal. Lungs CTAB with no wheezing, rales, rhonchi.   GI: Abdomen soft and non distended with normoactive bowel sounds present in all quadrants. No tenderness, rebound, guarding.   MUSCULOSKELETAL: No joint swelling or tenderness. Moves all extremities.   NEUROLOGICAL: No focal deficits. Strength 5/5 bilaterally in upper and lower extremities.   EXTREMITIES: No peripheral edema. Intact bilateral pedal pulses. Right foot edema and tenderness. Right leg wrapped from foot to knee.  SKIN: No jaundice. No rashes.        Data:  All laboratory data reviewed             Significant Results:     Lab Results   Component Value Date    WBC 13.5 (H) 04/08/2017    HGB 12.8 04/08/2017    HCT 39.9 04/08/2017     04/08/2017     04/08/2017    POTASSIUM 4.0 04/08/2017    CHLORIDE 104 04/08/2017    CO2 26 04/08/2017    BUN 16 04/08/2017    CR 1.01 04/08/2017     (H) 04/08/2017    SED 61 (H) 04/07/2017     DD 0.5 03/26/2013    NTBNPI 30 02/23/2015    NTBNP 21 03/15/2017    TROPONIN 0.01 01/05/2013    AST 9 02/13/2017    ALT 21 02/13/2017    ALKPHOS 95 10/18/2016    BILITOTAL 0.2 10/18/2016    INR 2.89 (H) 04/08/2017      Recent Results (from the past 48 hour(s))   US Lower Extremity Venous Duplex Right    Narrative    ULTRASOUND VENOUS RIGHT LOWER EXTREMITY WITH DOPPLER   4/6/2017 7:51  PM     HISTORY: Right leg pain and swelling.    COMPARISON: None.    TECHNIQUE: Spectral waveform and color Doppler evaluation were  performed.    FINDINGS: Note that the calf veins are not visualized in entirety due  to large body habitus. Normal compressibility and unremarkable Doppler  waveform evaluation of the right common femoral, femoral, popliteal  and posterior tibial veins. Edema is present in the soft tissues of  the right foot.      Impression    IMPRESSION: No evidence of thrombus in the major veins of the right  lower extremity.    CLARI LEMUS MD   Foot  XR, G/E 3 views, right    Narrative    FOOT RIGHT THREE OR MORE VIEWS  4/6/2017 7:56 PM     COMPARISON: None    HISTORY: Pain - evaluate for fracture.      Impression    IMPRESSION: There is marked soft tissue swelling throughout the right  foot. There is no evidence for fracture or dislocation of the right  foot.    DICK PETERSON MD                Pending Results:   Unresulted Labs Ordered in the Past 30 Days of this Admission     No orders found from 2/5/2017 to 4/7/2017.                  Discharge Instructions and Follow-Up:     Discharge Procedure Orders  Reason for your hospital stay   Order Comments: You were admitted with right foot/ankle pain and swelling. You were seen by the podiatry team and a wrap was applied. You will continue with the wrap until you seen them on Monday outpatient.     Activity   Order Comments: Your activity upon discharge: activity as tolerated   Order Specific Question Answer Comments   Is discharge order? Yes      When to contact  your care team   Order Comments: Call or return if you develop fever >101, increased pain/swelling of the foot/ankle, new onset redness or tenderness of the foot/ankle, confusion, altered mental status, or any other symptoms of concern to you.     Adult [facility] Follow-up and recommended labs and tests   Order Comments: Follow up with PCP within 3-5 days for hospital follow up.    Follow up with podiatry on Monday, April 10. Call them at [phone] to schedule.    Follow up with Coumadin clinic on 4/10    Appointments on [place] and/or [facility] (with [facility] or [facility] provider or service). Call [phone] if you haven't heard regarding these appointments within 7 days of discharge.     Full Code     Diet   Order Comments: Follow this diet upon discharge: Orders Placed This Encounter     Combination Diet 6113-8581 Calories: Moderate Consistent CHO (4-6 CHO units/meal)   Order Specific Question Answer Comments   Is discharge order? Yes         Nina Holbrook  [facility] Hospitalist  [phone]  April 8, 2017    Time spent on patient: 45 minutes total including face to face and coordinating care time reviewing current illness, any medication changes, and the care plan for today.    Discharge plan was discussed with patient, nursing, pharmacy, and attending physician, Dr. Cano

## 2017-04-08 NOTE — PLAN OF CARE
Problem: Goal Outcome Summary  Goal: Goal Outcome Summary  Outcome: Adequate for Discharge Date Met:  04/08/17  Pt. discharged at 312pm to home. Pt. was accompanied by boyfriend, and left with personal belongings. Pt. received complete discharge paperwork and oxycodone and coumadin medications as filled by discharge pharmacy. Pt. was given times of last dose for all discharge medications. Discharge teaching included coumadin medication management, bleeding s/sx and coumadin clinic follow up, diet, pain management, activity restrictions, dressing changes, and signs and symptoms of infection. Pt. to follow up with coumadin clinic 4/10, podiatry on Monday, and PCP within 3-5 days. Pt. had no further questions at the time of discharge and no unmet needs were identified.

## 2017-04-08 NOTE — PLAN OF CARE
Problem: Goal Outcome Summary  Goal: Goal Outcome Summary  OT:  Per PT, no needs.  Will complete orders.

## 2017-04-10 ENCOUNTER — ANTICOAGULATION THERAPY VISIT (OUTPATIENT)
Dept: ANTICOAGULATION | Facility: CLINIC | Age: 42
End: 2017-04-10

## 2017-04-10 DIAGNOSIS — M79.2 NEUROPATHIC PAIN OF LOWER EXTREMITY, UNSPECIFIED LATERALITY: ICD-10-CM

## 2017-04-10 DIAGNOSIS — I48.19 PERSISTENT ATRIAL FIBRILLATION (H): ICD-10-CM

## 2017-04-10 DIAGNOSIS — Z79.01 LONG-TERM (CURRENT) USE OF ANTICOAGULANTS: ICD-10-CM

## 2017-04-10 RX ORDER — ACETAMINOPHEN 325 MG/1
650 TABLET ORAL 3 TIMES DAILY PRN
Qty: 180 TABLET | Refills: 5 | Status: SHIPPED | OUTPATIENT
Start: 2017-04-10 | End: 2018-12-30

## 2017-04-10 NOTE — TELEPHONE ENCOUNTER
acetaminophen (TYLENOL) 325 MG tablet      Last Written Prescription Date:  1/26/2017  Last Fill Quantity: 60,   # refills: 0  Last Office Visit : 11/14/2016  Future Office visit:  0     BP Readings from Last 1 Encounters:   04/08/17 146/54     Lab Results   Component Value Date    AST 9 02/13/2017     Lab Results   Component Value Date    ALT 21 02/13/2017

## 2017-04-10 NOTE — PROGRESS NOTES
Dates of hospitalization: 4/6 to 4/8  Reason for hospitalization: right foot and ankle pain and swelling.  Vitamin K or FFP administered? no  Inpatient warfarin doses added to calendar? yes  Medication changes at discharge: -  Warfarin dosing after DC: 15mg  Patient discharged on Lovenox? no  Next INR date: 4/10  Will patient have home care? no

## 2017-04-10 NOTE — MR AVS SNAPSHOT
Pricilla Brown   4/10/2017   Anticoagulation Therapy Visit    Description:  42 year old female   Provider:  Dorene Edge, RN   Department:  [facility]           INR as of 4/10/2017     Today's INR No new INR was available at the time of this encounter.      Anticoagulation Summary as of 4/10/2017     INR goal 2.0-2.5   Today's INR No new INR was available at the time of this encounter.   Full instructions 20 mg on Mon, Fri; 15 mg all other days   Next INR check 4/10/2017    Indications   Atrial fibrillation (H) [I48.91] [I48.91]  Long-term (current) use of anticoagulants [Z79.01] [Z79.01]         April 2017 Details    Sun Mon Tue Wed Thu Fri Sat           1                 2               3               4               5               6               7               8                 9               10      See details      11               12               13               14               15                 16               17               18               19               20               21               22                 23               24               25               26               27               28               29                 30                      Date Details   04/10 This INR check       Date of next INR:  4/10/2017         How to take your warfarin dose     To take:  20 mg Take 4 of the 5 mg tablets.

## 2017-04-15 DIAGNOSIS — I42.0 DILATED CARDIOMYOPATHY (H): Primary | ICD-10-CM

## 2017-04-16 DIAGNOSIS — M54.50 LUMBAGO: ICD-10-CM

## 2017-04-16 RX ORDER — GABAPENTIN 300 MG/1
600 CAPSULE ORAL 2 TIMES DAILY
Qty: 360 CAPSULE | Refills: 2 | Status: SHIPPED | OUTPATIENT
Start: 2017-04-16 | End: 2017-10-17

## 2017-04-16 NOTE — TELEPHONE ENCOUNTER
gabapentin (NEURONTIN) 300 MG capsule     Last Written Prescription Date:  4/11/16  Last Fill Quantity: 120,   # refills: 5  Last Office Visit with G, P or [facility] prescribing provider: 11/14/16  Future Office visit:   4/17/17

## 2017-04-19 DIAGNOSIS — E11.9 DIABETES MELLITUS, TYPE 2 (H): Primary | ICD-10-CM

## 2017-04-20 ENCOUNTER — ANTICOAGULATION THERAPY VISIT (OUTPATIENT)
Dept: ANTICOAGULATION | Facility: CLINIC | Age: 42
End: 2017-04-20

## 2017-04-20 DIAGNOSIS — Z79.01 LONG-TERM (CURRENT) USE OF ANTICOAGULANTS: ICD-10-CM

## 2017-04-20 DIAGNOSIS — I48.19 PERSISTENT ATRIAL FIBRILLATION (H): ICD-10-CM

## 2017-04-20 NOTE — MR AVS SNAPSHOT
Pricilla Brown   4/20/2017   Anticoagulation Therapy Visit    Description:  42 year old female   Provider:  Demi Kebede, RN   Department:  [facility]           INR as of 4/20/2017     Today's INR       Anticoagulation Summary as of 4/20/2017     INR goal 2.0-2.5   Today's INR    Next INR check     Indications   Atrial fibrillation (H) [I48.91] [I48.91]  Long-term (current) use of anticoagulants [Z79.01] [Z79.01]         April 2017 Details    Sun Mon Tue Wed Thu Fri Sat           1                 2               3               4               5               6               7               8                 9               10      See details      11               12               13               14               15                 16               17               18               19               20               21               22                 23               24               25               26               27               28               29                 30                      Date Details   04/10 This INR check       Date of next INR:  4/10/2017         How to take your warfarin dose     To take:  20 mg Take 4 of the 5 mg tablets.

## 2017-04-20 NOTE — PROGRESS NOTES
Pt called reporting that she will come in for an INR 4/25. Reports that she is taking Warfarin 20mg WF and 15mg ROW

## 2017-04-25 ENCOUNTER — ANTICOAGULATION THERAPY VISIT (OUTPATIENT)
Dept: ANTICOAGULATION | Facility: CLINIC | Age: 42
End: 2017-04-25

## 2017-04-25 ENCOUNTER — ALLIED HEALTH/NURSE VISIT (OUTPATIENT)
Dept: SURGERY | Facility: CLINIC | Age: 42
End: 2017-04-25

## 2017-04-25 ENCOUNTER — ALLIED HEALTH/NURSE VISIT (OUTPATIENT)
Dept: CARDIOLOGY | Facility: CLINIC | Age: 42
End: 2017-04-25
Attending: INTERNAL MEDICINE
Payer: MEDICARE

## 2017-04-25 DIAGNOSIS — Z79.01 LONG-TERM (CURRENT) USE OF ANTICOAGULANTS: ICD-10-CM

## 2017-04-25 DIAGNOSIS — I48.19 PERSISTENT ATRIAL FIBRILLATION (H): ICD-10-CM

## 2017-04-25 DIAGNOSIS — Z79.01 LONG-TERM (CURRENT) USE OF ANTICOAGULANTS: Primary | ICD-10-CM

## 2017-04-25 DIAGNOSIS — I42.0 DILATED CARDIOMYOPATHY (H): ICD-10-CM

## 2017-04-25 LAB — INR PPP: 2.41 (ref 0.86–1.14)

## 2017-04-25 PROCEDURE — 0296T ZIO PATCH HOLTER: CPT | Mod: ZF

## 2017-04-25 PROCEDURE — 0298T ZZC EXT ECG > 48HR TO 21 DAY REVIEW AND INTERPRETATN: CPT | Mod: ZP | Performed by: INTERNAL MEDICINE

## 2017-04-25 PROCEDURE — 0296T ZZHC  EXT ECG > 48HR TO 21 DAY RCRD W/CONECT INTL RCRD: CPT | Mod: ZF

## 2017-04-25 NOTE — MR AVS SNAPSHOT
MRN:[id number]                      After Visit Summary   4/25/2017    Pricilla Brown    MRN: [id number]           Visit Information        Provider Department      4/25/2017 10:30 AM Paulette Ruiz RD M [facility] Surgical Weight Management        Your next 10 appointments already scheduled     Apr 28, 2017  3:00 PM CDT   Treatment 60 with Ur Cardiac Rehab 1   [facility], Cardiac Rehabilitation ([facility])    [address] [place] [address] [address]st [address] [address]   [phone]            May 01, 2017  3:00 PM CDT   Treatment 60 with Ur Cardiac Rehab 1   [facility], Cardiac Rehabilitation ([facility])    [address] [place] [address]   [phone]            May 03, 2017  8:30 AM CDT   (Arrive by 8:15 AM)   RETURN HEART FAILURE with Percy Alcala MD   [facility] ([facility])    [address] [place] [address]  3rd [facility] [address]   [phone]            May 03, 2017  3:00 PM CDT   Treatment 60 with Ur Cardiac Rehab 1   [facility], Cardiac Rehabilitation ([facility])    [address] [place] [address]   [phone]            May 04, 2017 11:00 AM CDT   [facility] Visit with Norman Jean DPM   [facility]    [address] [place] [address]   [phone]            May 05, 2017  3:00 PM CDT   Treatment 60 with Ur Cardiac Rehab 1   [facility], Cardiac Rehabilitation ([facility])    [address] [place] [address]   [phone]            May 08, 2017  3:00 PM CDT   Treatment 60 with Ur Cardiac Rehab 1   [facility]  [place], Cardiac Rehabilitation ([facility])    [address] [place] [address] 1st Floor [address]  [facility] [address]   [phone]            May 09, 2017 11:15 AM CDT   (Arrive by 11:00 AM)   Return Visit with Silva Damon MD   [facility] Medical Weight Management ([facility])    [address] [facility]  4th Floor  [facility] [address]   [phone]            May 10, 2017  3:00 PM CDT   Treatment 60 with Ur Cardiac Rehab 1   [facility], Cardiac Rehabilitation ([facility])    [address] [place] [address] [address]st [facility] [address]  [facility] [address]   [phone]            May 12, 2017  3:00 PM CDT   Treatment 60 with Ur Cardiac Rehab 1   [facility], Cardiac Rehabilitation ([facility])    [address] [place] [address] [address]st [facility] [address]  [facility] [address]   [phone]              Care Instructions    GOALS:  Relating To Eating:  -Track food intake prior to eating on [email or web address].[email or web address] with a target of no more than 1,800 Calories for 2 lbs/week weight loss, 2400 mg sodium or less/day would be great.   *Have 1 protein meal replacement shake daily (Premier Protein).   *Eat as much non-starchy vegetables as you need to fill you up (no calorie-containing dips/condiments).    (non-starchy veg: green leafy vegetables, cucumbers, broccoli, cauliflower, green string beans)   *Have 1 fruit daily.   *Drink at least 64 oz water between meals daily    -Eat slowly (20-30 minutes per meal), chewing foods well (25 chews per bite/applesauce consistency)    -Increase exercise as able.  Cardiac rehab 3 days weekly for 60 min.       [employer] Information     [employer] gives you secure access to your electronic health record. If you see a primary care provider, you can also send messages to your care team and make appointments. If  you have questions, please call your primary care clinic.  If you do not have a primary care provider, please call [phone] and they will assist you.      [employer] is an electronic gateway that provides easy, online access to your medical records. With [employer], you can request a clinic appointment, read your test results, renew a prescription or communicate with your care team.     To access your existing account, please contact your [facility] or call [phone] for assistance.        Care EveryWhere ID     This is your Care EveryWhere ID. This could be used by other organizations to access your [facility] medical records  [id number]

## 2017-04-25 NOTE — NURSING NOTE
Per Dr. Percy Alcala, patient to have 14 day ziopatch monitor placed.  Diagnosis: dilated cardiomyopathy  Monitor placed: Yes  Patient Instructed: Yes  Patient verbalized understanding: Yes  Holter # [id number]    Placed by Bernie Keenan  Documented by HEATHER Gibson

## 2017-04-25 NOTE — PROGRESS NOTES
"  Bariatric Nutrition Consultation Note    Reason For Visit: Nutrition Reassessment    Pricilla Brown is a 42 year-old (type 2 DM on insulin sliding scale) presenting today for bariatric nutrition consult.  Pt is interested in laparoscopic sleeve gastrectomy.  Pt has met all required RD visits prior to surgery.  Pt was referred by NANETTE Kerr.    ANTHROPOMETRICS:  Ht: 67\"  Initial Wt: 462.6 lbs with BMI of 72.6  Current Wt: 468.4 lbs (+7.4 lbs over the past month; +5.8 lbs from initial weight)     Required weight loss goal pre-op: -46 lbs from initial consult weight (goal weight 416.6 lbs or less before surgery)    NUTRITION HISTORY:  Food Allergies/Intolerances: none known  Cravings: sweets, chips, pop (diet)  Triggers/cues causing extra eating: boredom (currently on disability, not working)    Progress with Previous Goals:  -Track food intake prior to eating on MyFitnessPal.com with a target of no more than 1,800 Calories for 2 lbs/week weight loss, 2400 mg sodium or less/day would be great.- She is tracking food intake which has been around 6637-9998 Jagjit/day. She notices that when she uses the protein shake for one meal, it helps reduce her calories.   - Once you are re-evaluated by medical weight management and your diabetes management team, start a modified liquid diet (900 Jagjit/day): - Not meeting since when she has two meal replacement shakes, she ends up overeating due to extreme hunger.  Follow the Modified Liquid Diet for weight loss:  Breakfast: Protein Shake  Lunch: Protein Shake  Supper: 3 oz lean protein + non-starchy vegetables  Snack: non-starchy vegetables (no calorie-containing dips/condiments)  Beverages: at least 48-64 oz water between meals daily    *Protein Shake Criteria: ~200 Calories, at least 20 grams of protein, and less than 10 grams of sugar (Premier Protein would work with 30 g protein and 160 Jagjit)    -Eat slowly (20-30 minutes per meal), chewing foods well (25 chews per " bite/applesauce consistency)- Improving per Pt report.     -Continue to avoid calorie containing beverages while increasing water and decreasing carbonation (diet pop).- Meeting; just diet pop 1 can daily.     -Increase walking as able.  Walk around the building twice weekly, increasing as able.- Unable to do this due to feet swelling up for the past 3 weeks.  She has been to the hospital for this and her doctors are aware per Pt report.       NUTRITION DIAGNOSIS:  Obesity r/t long history of self-monitoring deficit and excessive energy intake aeb BMI >30.- continues    INTERVENTION:  Intervention Provided/Education Provided: Discussed possible reasoning for lack of weight loss.  Reviewed previous goals and how to improve weight loss progress.  Discussed portion control, volumetric-type eating for satiety on less calories.  Emphasized the importance of mindful eating.  Gave encouragement and support.  Provided pt with list of goals.      Patient Understanding: good  Expected Compliance: good      GOALS:  Relating To Eating:  -Track food intake prior to eating on [email or web address].com with a target of no more than 1,800 Calories for 2 lbs/week weight loss, 2400 mg sodium or less/day would be great.   *Have 1 protein meal replacement shake daily (Premier Protein).   *Eat as much non-starchy vegetables as you need to fill you up (no calorie-containing dips/condiments).    (non-starchy veg: green leafy vegetables, cucumbers, broccoli, cauliflower, green string beans)   *Have 1 fruit daily.   *Drink at least 64 oz water between meals daily    -Eat slowly (20-30 minutes per meal), chewing foods well (25 chews per bite/applesauce consistency)    -Increase exercise as able.  Cardiac rehab 3 days weekly for 60 min.    Follow-Up: medical weight management as soon as possible (NANETTE Kerr), weight check in 2 weeks, follow-up with RD in 1 month.     Time spent with patient: 30 minutes.  Paulette Ruiz, MS, RDN,  ERENDIRA HARTMAN  Pager: [phone]

## 2017-04-25 NOTE — MR AVS SNAPSHOT
After Visit Summary   4/25/2017    Pricilla Brown    MRN: [id number]           Patient Information     Date Of Birth          1975        Visit Information        Provider Department      4/25/2017 10:30 AM Paulette Ruiz RD M [facility] Surgical Weight Management        Care Instructions    GOALS:  Relating To Eating:  -Track food intake prior to eating on MyFitnessPal.com with a target of no more than 1,800 Calories for 2 lbs/week weight loss, 2400 mg sodium or less/day would be great.   *Have 1 protein meal replacement shake daily (Premier Protein).   *Eat as much non-starchy vegetables as you need to fill you up (no calorie-containing dips/condiments).    (non-starchy veg: green leafy vegetables, cucumbers, broccoli, cauliflower, green string beans)   *Have 1 fruit daily.   *Drink at least 64 oz water between meals daily    -Eat slowly (20-30 minutes per meal), chewing foods well (25 chews per bite/applesauce consistency)    -Increase exercise as able.  Cardiac rehab 3 days weekly for 60 min.        Follow-ups after your visit        Your next 10 appointments already scheduled     Apr 28, 2017  3:00 PM CDT   Treatment 60 with Ur Cardiac Rehab 1   [facility], Cardiac Rehabilitation ([facility])    [address] [place] [address]   [phone]            May 01, 2017  3:00 PM CDT   Treatment 60 with Ur Cardiac Rehab 1   [facility], Cardiac Rehabilitation ([facility])    [address] [place] [address]   [phone]            May 03, 2017  8:30 AM CDT   (Arrive by 8:15 AM)   RETURN HEART FAILURE with MD IVY Romo [facility] ([facility])    [address] [place] [address]   [phone]            May 03, 2017  3:00 PM CDT    Treatment 60 with Ur Cardiac Rehab 1   [facility], Cardiac Rehabilitation ([facility])    [facility] [address] 1st [address]  [facility] [address]   [phone]            May 04, 2017 11:00 AM CDT   Return Visit with Norman Jean DPM   [facility] ([facility])    [address] [place] [address]   [phone]            May 05, 2017  3:00 PM CDT   Treatment 60 with Ur Cardiac Rehab 1   [facility], Cardiac Rehabilitation ([facility])    [address] [place] [address] 1st Floor [address]  [facility] [address]   [phone]            May 08, 2017  3:00 PM CDT   Treatment 60 with Ur Cardiac Rehab 1   [facility], Cardiac Rehabilitation ([facility])    [address] [place] [address] [address]st Floor [address] [address]   [phone]            May 09, 2017 11:15 AM CDT   (Arrive by 11:00 AM)   Return Visit with Silva Damon MD   [facility] Medical Weight Management ([facility])    [address] [facility]  4th Floor  [facility] [address] [phone]            May 10, 2017  3:00 PM CDT   Treatment 60 with Ur Cardiac Rehab 1   [facility], Cardiac Rehabilitation ([facility])    [address] [place] [address] 1st Floor [address]  [facility] [address]   [phone]            May 12, 2017  3:00 PM CDT   Treatment 60 with Ur Cardiac Rehab 1   [facility], Cardiac Rehabilitation ([facility])    [address] [place] [address] 1st Floor [address]  [facility] [address]   [phone]              Who to contact     Please call your clinic at [phone] to:    Ask questions about your health    Make or cancel appointments    Discuss your  medicines    Learn about your test results    Speak to your doctor   If you have compliments or concerns about an experience at your clinic, or if you wish to file a complaint, please contact [facility] Patient Relations at [phone] or email us at [email or web address]         Additional Information About Your Visit        MyChart Information     The [employer] gives you secure access to your electronic health record. If you see a primary care provider, you can also send messages to your care team and make appointments. If you have questions, please call your primary care clinic.  If you do not have a primary care provider, please call [phone] and they will assist you.      [employer] is an electronic gateway that provides easy, online access to your medical records. With [employer], you can request a clinic appointment, read your test results, renew a prescription or communicate with your care team.     To access your existing account, please contact your [facility] or call [phone] for assistance.        Care EveryWhere ID     This is your Care EveryWhere ID. This could be used by other organizations to access your [facility] medical records  [id number]         Blood Pressure from Last 3 Encounters:   04/08/17 146/54   03/15/17 141/84   03/14/17 125/70    Weight from Last 3 Encounters:   04/06/17 (!) 219.5 kg (484 lb)   03/23/17 (!) 209.1 kg (461 lb)   03/15/17 (!) 211.6 kg (466 lb 8 oz)              Today, you had the following     No orders found for display       Primary Care Provider Office Phone # Fax #    Jamilah Bernal [phone]       [facility] [address]  [facility] [address]        Thank you!     Thank you for choosing [facility] SURGICAL WEIGHT MANAGEMENT  for your care. Our goal is always to provide you with excellent care. Hearing back from our patients is one way we can continue to improve our  services. Please take a few minutes to complete the written survey that you may receive in the mail after your visit with us. Thank you!             Your Updated Medication List - Protect others around you: Learn how to safely use, store and throw away your medicines at www.disposemymeds.org.          This list is accurate as of: 4/25/17 10:57 AM.  Always use your most recent med list.                   Brand Name Dispense Instructions for use    acetaminophen 325 MG tablet    TYLENOL    180 tablet    Take 2 tablets (650 mg) by mouth 3 times daily as needed for mild pain or fever (total acetaminophen dose should not exceed 3000 mg per day)       albuterol 108 (90 BASE) MCG/ACT Inhaler    PROAIR HFA/PROVENTIL HFA/VENTOLIN HFA    1 Inhaler    Inhale 2 puffs into the lungs every 6 hours as needed.       bisacodyl 10 MG Suppository    DULCOLAX    6 suppository    Place 1 suppository (10 mg) rectally daily as needed for constipation       * blood glucose monitoring lancets     1 Box    Use to test blood sugars 4 times daily or as directed.       * blood glucose monitoring lancets     4 Box    Use to test blood sugar 4 times daily or as directed.       blood glucose monitoring meter device kit     1 kit    Use to test blood sugars 2 times daily or as directed.       * blood glucose monitoring test strip    ACCU-CHEK RON    100 strip    Use to test blood sugars 2 times daily or as directed.       * blood glucose monitoring test strip    ACCU-CHEK SMARTVIEW    450 each    Use to test blood sugar 5 times daily       buPROPion 100 MG 12 hr tablet    WELLBUTRIN SR    60 tablet    Take 1 tablet (100 mg) by mouth 2 times daily       capsaicin 0.025 % Crea cream    ZOSTRIX    1 Tube    Apply 1 g topically 3 times daily as needed       ciclopirox 8 % Soln     1 Bottle    Externally apply topically daily To toenails.       diazepam 5 MG tablet    VALIUM    1 tablet    Hand carry to procedure on 2/21. Do not take until instructed.  "      diclofenac 1 % Gel topical gel    VOLTAREN    100 g    Place 4 g onto the skin 4 times daily       DULoxetine 20 MG EC capsule    CYMBALTA    60 capsule    Take 1 capsule (20 mg) by mouth 2 times daily       Efinaconazole 10 % Soln     8 mL    Externally apply topically daily To toenails.       gabapentin 300 MG capsule    NEURONTIN    360 capsule    Take 2 capsules (600 mg) by mouth 2 times daily       hydrALAZINE 25 MG tablet    APRESOLINE    150 tablet    Take 1 tab (25 mg) in am, 2 tabs (50 mg) midday, and 2 tabs (50 mg) in pm daily       insulin glargine U-300 300 UNIT/ML injection    TOUJEO    15 mL    Inject 160 units SQ each am.       * insulin pen needle 30G X 8 MM    NOVOFINE    100 each    Use 2X daily or as directed.       * insulin pen needle 32G X 4 MM    BD RIVER U/F    200 each    Use 6 daily or as directed.       insulin syringe-needle U-100 30G X 1/2\" 0.5 ML    BD insulin syringe ULTRAFINE    100 each    Use one syringe 3 daily or as directed.       levothyroxine 200 MCG tablet    SYNTHROID/LEVOTHROID    90 tablet    Take 1 tablet (200 mcg) by mouth daily       liraglutide 18 MG/3ML soln    VICTOZA PEN    27 mL    Inject 1.8 mg Subcutaneous daily       metoprolol 50 MG 24 hr tablet    TOPROL-XL    90 tablet    Take 0.5 tablets (25 mg) by mouth At Bedtime       morphine 15 MG 12 hr tablet    MS CONTIN     Take 15 mg by mouth daily as needed       Nebulizer Compressor Kit     1 kit    1 Device 4 times daily as needed.       nicotine 14 MG/24HR 24 hr patch    [facility] NICOTINE    30 patch    Place 1 patch onto the skin every 24 hours       NovoLOG FLEXPEN 100 UNIT/ML injection   Generic drug:  insulin aspart     60 mL    Inject 60 units with meals plus correction. Pt uses approx 180 units in 24 hrs.       nystatin cream    MYCOSTATIN    90 g    Apply topically 2 times daily To toenails       order for DME     1 each    Use your CPAP device as directed by your provider. Pressure change to min 13 max " 18cwp       * order for DME     1 each    Equipment being ordered: Challenger Wide walker if available - patient needs seat, basket and brakes.       * order for DME     1 each    Equipment being ordered:  [id number] $92  Plantar, fasciitis, LG, night splint       * order for DME     1 Units    Left foot       oxyCODONE 10 MG IR tablet    ROXICODONE    30 tablet    Take 1-1.5 tablets (10-15 mg) by mouth every 3 hours as needed for moderate to severe pain       oxyCODONE-acetaminophen 5-325 MG per tablet    PERCOCET    60 tablet    Take 1 tablet by mouth every 8 hours as needed for moderate to severe pain (try to limit use, no further prescriptions until seen in pain clinic)       polyethylene glycol powder    MIRALAX/GLYCOLAX         potassium chloride SA 20 MEQ CR tablet    potassium chloride    240 tablet    Take 2 tablets (40 mEq) by mouth 4 times daily       senna 8.6 MG tablet    SENOKOT    45 tablet    Take 1 tablet by mouth 2 times daily       spironolactone 50 MG tablet    ALDACTONE    30 tablet    Take 1 tablet (50 mg) by mouth daily       tiotropium 18 MCG capsule    SPIRIVA HANDIHALER    30 capsule    Inhale contents of one capsule daily.       topiramate 25 MG tablet    TOPAMAX    180 tablet    Take 3 tablets (75 mg) by mouth 2 times daily       torsemide 100 MG tablet    DEMADEX    180 tablet    Take 1 tablet (100 mg) by mouth 2 times daily       warfarin 5 MG tablet    COUMADIN    30 tablet    Take 2 tablets (10 mg) by mouth once for 1 dose Take 15 mg PO daily on 10/20, 10/21, 10/22. Check INR on 10/23. Further dosing per INR level.       * Notice:  This list has 9 medication(s) that are the same as other medications prescribed for you. Read the directions carefully, and ask your doctor or other care provider to review them with you.

## 2017-04-25 NOTE — PROGRESS NOTES
ANTICOAGULATION FOLLOW-UP CLINIC VISIT    Patient Name:  Pricilla Brown  Date:  4/25/2017  Contact Type:  Telephone    SUBJECTIVE:     Patient Findings     Positives No Problem Findings           OBJECTIVE    INR   Date Value Ref Range Status   04/25/2017 2.41 (H) 0.86 - 1.14 Final     Chromogenic Factor 10   Date Value Ref Range Status   04/08/2017 19 (L) 70 - 130 % Final     Comment:     Therapeutic Range:  A Chromogenic Factor 10 level of approximately 20-40%   inversely correlates with an INR of 2-3 for patients receiving Warfarin.   Chromogenic Factor 10 levels below 20% indicate an INR greater than 3 and   levels above 40% indicate an INR less than 2.         ASSESSMENT / PLAN  INR assessment THER    Recheck INR In: 2 WEEKS    INR Location Clinic      Anticoagulation Summary as of 4/25/2017     INR goal 2.0-2.5   Today's INR 2.41   Maintenance plan 20 mg (5 mg x 4) on Mon, Fri; 15 mg (5 mg x 3) all other days   Full instructions 20 mg on Mon, Fri; 15 mg all other days   Weekly total 115 mg   Plan last modified Velvet Asencio RN (3/15/2017)   Next INR check 5/9/2017   Priority INR   Target end date Indefinite    Indications   Atrial fibrillation (H) [I48.91] [I48.91]  Long-term (current) use of anticoagulants [Z79.01] [Z79.01]         Anticoagulation Episode Summary     INR check location Clinic Lab    Preferred lab     Send INR reminders to [facility]    Comments Contact Patient at [phone]      Anticoagulation Care Providers     Provider Role Specialty Phone number    Percy Alcala MD  Cardiology [phone]            See the Encounter Report to view Anticoagulation Flowsheet and Dosing Calendar (Go to Encounters tab in chart review, and find the Anticoagulation Therapy Visit)    Spoke with patient. Gave them their lab results and new warfarin recommendation.  No changes in health, medication, or diet. No missed doses, no falls. No signs or symptoms of bleed or clotting.      Demi Kebede, RN

## 2017-04-25 NOTE — MR AVS SNAPSHOT
Pricilla Brown   4/25/2017   Anticoagulation Therapy Visit    Description:  42 year old female   Provider:  Demi Kebede, RN   Department:  [facility]           INR as of 4/25/2017     Today's INR 2.41      Anticoagulation Summary as of 4/25/2017     INR goal 2.0-2.5   Today's INR 2.41   Full instructions 20 mg on Mon, Fri; 15 mg all other days   Next INR check 5/9/2017    Indications   Atrial fibrillation (H) [I48.91] [I48.91]  Long-term (current) use of anticoagulants [Z79.01] [Z79.01]         April 2017 Details    Sun Mon Tue Wed Thu Fri Sat           1                 2               3               4               5               6               7               8                 9               10               11               12               13               14               15                 16               17               18               19               20               21               22                 23               24               25      15 mg   See details      26      15 mg         27      15 mg         28      20 mg         29      15 mg           30      15 mg                Date Details   04/25 This INR check               How to take your warfarin dose     To take:  15 mg Take 3 of the 5 mg tablets.    To take:  20 mg Take 4 of the 5 mg tablets.           May 2017 Details    Sun Mon Tue Wed Thu Fri Sat      1      20 mg         2      15 mg         3      15 mg         4      15 mg         5      20 mg         6      15 mg           7      15 mg         8      20 mg         9            10               11               12               13                 14               15               16               17               18               19               20                 21               22               23               24               25               26               27                 28               29               30               31                   Date  Details   No additional details    Date of next INR:  5/9/2017         How to take your warfarin dose     To take:  15 mg Take 3 of the 5 mg tablets.    To take:  20 mg Take 4 of the 5 mg tablets.

## 2017-04-25 NOTE — PATIENT INSTRUCTIONS
GOALS:  Relating To Eating:  -Track food intake prior to eating on MyFitnessPal.com with a target of no more than 1,800 Calories for 2 lbs/week weight loss, 2400 mg sodium or less/day would be great.   *Have 1 protein meal replacement shake daily (Premier Protein).   *Eat as much non-starchy vegetables as you need to fill you up (no calorie-containing dips/condiments).    (non-starchy veg: green leafy vegetables, cucumbers, broccoli, cauliflower, green string beans)   *Have 1 fruit daily.   *Drink at least 64 oz water between meals daily    -Eat slowly (20-30 minutes per meal), chewing foods well (25 chews per bite/applesauce consistency)    -Increase exercise as able.  Cardiac rehab 3 days weekly for 60 min.

## 2017-04-25 NOTE — MR AVS SNAPSHOT
After Visit Summary   4/25/2017    Pricilla Brown    MRN: [id number]           Patient Information     Date Of Birth          1975        Visit Information        Provider Department      4/25/2017 9:00 AM Tech, [facility] Cvc Monitor, [facility]        Today's Diagnoses     Dilated cardiomyopathy (H)           Follow-ups after your visit        Your next 10 appointments already scheduled     Apr 28, 2017  3:00 PM CDT   Treatment 60 with Ur Cardiac Rehab 1   [facility], Cardiac Rehabilitation ([facility])    [address] [place] [address]   [phone]            May 01, 2017  3:00 PM CDT   Treatment 60 with Ur Cardiac Rehab 1   [facility], Cardiac [facility] ([facility])    [address] [place] [address]   [phone]            May 03, 2017  8:30 AM CDT   (Arrive by 8:15 AM)   RETURN HEART FAILURE with Percy Alcala MD   [facility] ([facility])    [address] [place] [address]   [phone]            May 03, 2017  3:00 PM CDT   Treatment 60 with Ur Cardiac Rehab 1   [facility] Card[facility] ([facility])    [address] [place] [address]   [phone]            May 04, 2017 11:00 AM CDT   Return Visit with Norman Jean DPM   [facility] ([facility])    [address] [place] [address]   [phone]            May 05, 2017  3:00 PM CDT   Treatment 60 with Ur Cardiac Rehab 1   [facility] Card[facility] ([facility])    [address] [place] [address]    [phone]            May 08, 2017  3:00 PM CDT   Treatment 60 with Ur Cardiac Rehab 1   [facility], Cardiac Rehabilitation ([facility])    [address] [place] [address] [address] [address]   [phone]            May 09, 2017 11:15 AM CDT   (Arrive by 11:00 AM)   Return Visit with Silva Damon MD   [facility] ([facility])    [address] [facility]  4th Floor  [facility] [id number] [phone]            May 10, 2017  3:00 PM CDT   Treatment 60 with Ur Cardiac Rehab 1   [facility], Cardiac Rehabilitation ([facility])    [address] [place] [address]   [phone]            May 12, 2017  3:00 PM CDT   Treatment 60 with Ur Cardiac Rehab 1   [facility], Cardiac Rehabilitation ([facility])    [address] [place] [address]   [phone]              Who to contact     If you have questions or need follow up information about today's clinic visit or your schedule please contact [facility] directly at [phone].  Normal or non-critical lab and imaging results will be communicated to you by [employer], letter or phone within 4 business days after the clinic has received the results. If you do not hear from us within 7 days, please contact the clinic through [employer] or phone. If you have a critical or abnormal lab result, we will notify you by phone as soon as possible.  Submit refill requests through [employer] or call your pharmacy and they will forward the refill request to us. Please allow 3 business days for your refill to be completed.          Additional Information About Your Visit        [employer] Information     [employer] gives you secure access to your electronic  health record. If you see a primary care provider, you can also send messages to your care team and make appointments. If you have questions, please call your primary care clinic.  If you do not have a primary care provider, please call [phone] and they will assist you.        Care EveryWhere ID     This is your Care EveryWhere ID. This could be used by other organizations to access your [facility] medical records  [id number]         Blood Pressure from Last 3 Encounters:   04/08/17 146/54   03/15/17 141/84   03/14/17 125/70    Weight from Last 3 Encounters:   04/06/17 (!) 219.5 kg (484 lb)   03/23/17 (!) 209.1 kg (461 lb)   03/15/17 (!) 211.6 kg (466 lb 8 oz)              We Performed the Following     Ziopatch Holter Monitor - Adult        Primary Care Provider Office Phone # Fax #    Jamilah Bernal [phone]       [address] [address]        Thank you!     Thank you for choosing [facility]  for your care. Our goal is always to provide you with excellent care. Hearing back from our patients is one way we can continue to improve our services. Please take a few minutes to complete the written survey that you may receive in the mail after your visit with us. Thank you!             Your Updated Medication List - Protect others around you: Learn how to safely use, store and throw away your medicines at www.disposemymeds.org.          This list is accurate as of: 4/25/17  4:34 PM.  Always use your most recent med list.                   Brand Name Dispense Instructions for use    acetaminophen 325 MG tablet    TYLENOL    180 tablet    Take 2 tablets (650 mg) by mouth 3 times daily as needed for mild pain or fever (total acetaminophen dose should not exceed 3000 mg per day)       albuterol 108 (90 BASE) MCG/ACT Inhaler    PROAIR HFA/PROVENTIL HFA/VENTOLIN HFA    1 Inhaler    Inhale 2 puffs into the lungs every 6 hours as needed.       bisacodyl  10 MG Suppository    DULCOLAX    6 suppository    Place 1 suppository (10 mg) rectally daily as needed for constipation       * blood glucose monitoring lancets     1 Box    Use to test blood sugars 4 times daily or as directed.       * blood glucose monitoring lancets     4 Box    Use to test blood sugar 4 times daily or as directed.       blood glucose monitoring meter device kit     1 kit    Use to test blood sugars 2 times daily or as directed.       * blood glucose monitoring test strip    ACCU-CHEK RON    100 strip    Use to test blood sugars 2 times daily or as directed.       * blood glucose monitoring test strip    ACCU-CHEK SMARTVIEW    450 each    Use to test blood sugar 5 times daily       buPROPion 100 MG 12 hr tablet    WELLBUTRIN SR    60 tablet    Take 1 tablet (100 mg) by mouth 2 times daily       capsaicin 0.025 % Crea cream    ZOSTRIX    1 Tube    Apply 1 g topically 3 times daily as needed       ciclopirox 8 % Soln     1 Bottle    Externally apply topically daily To toenails.       diazepam 5 MG tablet    VALIUM    1 tablet    Hand carry to procedure on 2/21. Do not take until instructed.       diclofenac 1 % Gel topical gel    VOLTAREN    100 g    Place 4 g onto the skin 4 times daily       DULoxetine 20 MG EC capsule    CYMBALTA    60 capsule    Take 1 capsule (20 mg) by mouth 2 times daily       Efinaconazole 10 % Soln     8 mL    Externally apply topically daily To toenails.       gabapentin 300 MG capsule    NEURONTIN    360 capsule    Take 2 capsules (600 mg) by mouth 2 times daily       hydrALAZINE 25 MG tablet    APRESOLINE    150 tablet    Take 1 tab (25 mg) in am, 2 tabs (50 mg) midday, and 2 tabs (50 mg) in pm daily       insulin glargine U-300 300 UNIT/ML injection    TOUJEO    15 mL    Inject 160 units SQ each am.       * insulin pen needle 30G X 8 MM    NOVOFINE    100 each    Use 2X daily or as directed.       * insulin pen needle 32G X 4 MM    BD RIVER U/F    200 each    Use 6  "daily or as directed.       insulin syringe-needle U-100 30G X 1/2\" 0.5 ML    BD insulin syringe ULTRAFINE    100 each    Use one syringe 3 daily or as directed.       levothyroxine 200 MCG tablet    SYNTHROID/LEVOTHROID    90 tablet    Take 1 tablet (200 mcg) by mouth daily       liraglutide 18 MG/3ML soln    VICTOZA PEN    27 mL    Inject 1.8 mg Subcutaneous daily       metoprolol 50 MG 24 hr tablet    TOPROL-XL    90 tablet    Take 0.5 tablets (25 mg) by mouth At Bedtime       morphine 15 MG 12 hr tablet    MS CONTIN     Take 15 mg by mouth daily as needed       Nebulizer Compressor Kit     1 kit    1 Device 4 times daily as needed.       nicotine 14 MG/24HR 24 hr patch    [facility] NICOTINE    30 patch    Place 1 patch onto the skin every 24 hours       NovoLOG FLEXPEN 100 UNIT/ML injection   Generic drug:  insulin aspart     60 mL    Inject 60 units with meals plus correction. Pt uses approx 180 units in 24 hrs.       nystatin cream    MYCOSTATIN    90 g    Apply topically 2 times daily To toenails       order for DME     1 each    Use your CPAP device as directed by your provider. Pressure change to min 13 max 18cwp       * order for DME     1 each    Equipment being ordered: Challenger Wide walker if available - patient needs seat, basket and brakes.       * order for DME     1 each    Equipment being ordered: [id number] $92  Plantar, fasciitis, LG, night splint       * order for DME     1 Units    Left foot       oxyCODONE 10 MG IR tablet    ROXICODONE    30 tablet    Take 1-1.5 tablets (10-15 mg) by mouth every 3 hours as needed for moderate to severe pain       oxyCODONE-acetaminophen 5-325 MG per tablet    PERCOCET    60 tablet    Take 1 tablet by mouth every 8 hours as needed for moderate to severe pain (try to limit use, no further prescriptions until seen in pain clinic)       polyethylene glycol powder    MIRALAX/GLYCOLAX         potassium chloride SA 20 MEQ CR tablet    potassium chloride    240 " tablet    Take 2 tablets (40 mEq) by mouth 4 times daily       senna 8.6 MG tablet    SENOKOT    45 tablet    Take 1 tablet by mouth 2 times daily       spironolactone 50 MG tablet    ALDACTONE    30 tablet    Take 1 tablet (50 mg) by mouth daily       tiotropium 18 MCG capsule    SPIRIVA HANDIHALER    30 capsule    Inhale contents of one capsule daily.       topiramate 25 MG tablet    TOPAMAX    180 tablet    Take 3 tablets (75 mg) by mouth 2 times daily       torsemide 100 MG tablet    DEMADEX    180 tablet    Take 1 tablet (100 mg) by mouth 2 times daily       warfarin 5 MG tablet    COUMADIN    30 tablet    Take 2 tablets (10 mg) by mouth once for 1 dose Take 15 mg PO daily on 10/20, 10/21, 10/22. Check INR on 10/23. Further dosing per INR level.       * Notice:  This list has 9 medication(s) that are the same as other medications prescribed for you. Read the directions carefully, and ask your doctor or other care provider to review them with you.

## 2017-04-28 ENCOUNTER — HOSPITAL ENCOUNTER (OUTPATIENT)
Dept: CARDIAC REHAB | Facility: CLINIC | Age: 42
End: 2017-04-28
Attending: INTERNAL MEDICINE
Payer: MEDICARE

## 2017-04-28 LAB
GLUCOSE BLDC GLUCOMTR-MCNC: 108 MG/DL (ref 70–99)
GLUCOSE BLDC GLUCOMTR-MCNC: 79 MG/DL (ref 70–99)
GLUCOSE BLDC GLUCOMTR-MCNC: 91 MG/DL (ref 70–99)

## 2017-04-28 PROCEDURE — 00000146 ZZHCL STATISTIC GLUCOSE BY METER IP

## 2017-04-28 PROCEDURE — 93798 PHYS/QHP OP CAR RHAB W/ECG: CPT | Performed by: REHABILITATION PRACTITIONER

## 2017-04-28 PROCEDURE — 40000116 ZZH STATISTIC OP CR VISIT: Performed by: REHABILITATION PRACTITIONER

## 2017-05-02 ENCOUNTER — PRE VISIT (OUTPATIENT)
Dept: CARDIOLOGY | Facility: CLINIC | Age: 42
End: 2017-05-02

## 2017-05-02 DIAGNOSIS — I50.32 CHRONIC DIASTOLIC HEART FAILURE (H): ICD-10-CM

## 2017-05-02 DIAGNOSIS — I48.20 CHRONIC ATRIAL FIBRILLATION (H): Primary | ICD-10-CM

## 2017-05-02 DIAGNOSIS — I42.0 DILATED CARDIOMYOPATHY (H): ICD-10-CM

## 2017-05-08 DIAGNOSIS — G47.33 OSA (OBSTRUCTIVE SLEEP APNEA): Primary | ICD-10-CM

## 2017-05-09 VITALS — HEIGHT: 67 IN | WEIGHT: 293 LBS | BODY MASS INDEX: 45.99 KG/M2

## 2017-05-09 ASSESSMENT — 6 MINUTE WALK TEST (6MWT)
FEMALE CALC: 961.17
GENDER SELECTION: FEMALE
TOTAL DISTANCE WALKED (FT): 132
PREDICTED: 1290.82
MALE CALC: 1282.99

## 2017-05-09 NOTE — ADDENDUM NOTE
Encounter addended by: Su Vigil OT on: 5/9/2017  2:54 PM<BR>     Actions taken: Flowsheet data copied forward, Sign clinical note, Flowsheet accepted

## 2017-05-09 NOTE — PROGRESS NOTES
Pricilla Brown  42 year old 05/09/17 1400   Session   Session 60 Day Individualized Treatment Plan   Certified through this date 06/07/17   Cardiac Rehab Assessment   Cardiac Rehab Assessment Pt has dialated cardiomyopathy with diastolic disfunction. She has diabetes, Graves disease, JYOTI, and is morbidly obese. She demonstrate signif. deconditioning, unable to walk more that 15-30 seconds behind w/c without rest breaks, she walked a total of 3 min. Pt is somewhat concerned about participating in group due to her obesity, but is open to it. She is working with the wt. management clinic for about 7 months. Pt is motivated to get started with regular exercise.  4/10: ITP copied forward, patient has attended 2 CR sessions and has been hospitalized 4/6-4/8/17 due to right foot edema/tenderness. Resume CR when able.  Skilled therapy needed to monitor CV response to exercise, provide support and education to acheive goals. 5/9 Pt has cancelled for past week. When called she reports she has been extremely weak and fatigued from low blood sugar and struggle with getting diabetes under control. She agrees to return 5/10 to participate with some consistancy for next 2 weeks. She has come 3x in 6 wks since initial. She needs CR consistantly to assist with Blood sugar control improve her fitness whille monitoring cv response to exercise, also provide counseling and education to make lifestyle changes.    General Information   Treatment Diagnosis Systolic Heart Failure with Preserved EF   Date of Treatment Diagnosis 10/13/16   Secondary Treatment Diagnosis Diastolic Heart Failure   Significant Past CV History History of Heart Failure;Chronic AF   Comorbidities DM   Other Medical History JYOTI, Cellulitis, neuropathic pain, Asthma, Hypthyroidism, morbid obesity, diabetes, 2013 pulm embolism.    Lead up symptoms dyspnea, unable to lay flat, exhaustion, palpitation   Hospital Location [facility]   Hospital Discharge Date 10/20/16   Signs  "and Symptoms Post Hospital Discharge Palpitations;Fatigue;SOB;Lightheadedness;Anxiety   Outpatient Cardiac Rehab Start Date 03/23/17   Primary Physician Jamilah Bernal Kristi Kopec ([facility])   Primary Physician Follow Up Scheduled  (with [facility], )   Cardiologist Percy Alcala   Ejection Fraction 35%   Risk Stratification High   Living and Work Status    Living Arrangements and Social Status apartment;alone   Support System Check-in help as needed   Return to Employment No   Occupation customer service, telemarketing   Preventative Medications   CMS recommended medications Anticoagulants;Beta Blocker;Pneumonia vaccination   Falls Screen   Have you fallen two or more times in the past year? Yes   Have you fallen and had an injury in the past year? No   Referral Initiated to Physical Therapy Patient recently attended Physical Therapy   Pain   Patient Currently in Pain Yes   Pain Location back    Pain Rating 6   Pain Description Heaviness;Ache   Pain Description Comment sitting or laying down   Pain Treatment Recommendations pain med.    Physical Assessments   Incisions Not applicable   Edema +1 Trace   Limitations Orthopedic   Comments Back pain is chronic. Has rt foot pain from injury but appears under control   Individualized Treatment Plan   Monitored Sessions Scheduled 24   Monitored Sessions Attended 3   Oxygen   Supplemental Oxygen needed No   Nutrition Management - Weight Management   Assessment Re-assessment   Age 42   Weight (!) 211.1 kg (465 lb 8 oz)   Height 1.702 m (5' 7.01\")   BMI (Calculated) 73.04   Goal Weight 113.4 kg (250 lb)   Initial Rate Your Plate Score. Dietary tool to assess eating patterns. Scores range from 24 to 72. The higher the score the healthier the eating pattern. 45   Nutrition Management - Diabetes   Diabetes Type II   Do you Monitor BS at Home? Yes   Diabetes Medication Prescribed Yes, Insulin   Hb A1C Result: 8.8   Diabetes Comments diagnosed in 2015   Nutrition " Management Summary   Dietary Recommendations Low Fat;Low Cholesterol;Low Sodium;Diabetic  (using meal replacement)   Stages of Change for Diet Compliance Action   Interventions Planned Attend Nutrition Education Class(es);Educate on Weight Management Principles;Educate on Benefits of Exercise  (cont with wt. mgnt clinic)   Patient Goals Goal #1   Goal #1 Description Pt would like to loose 1-2# per week, by using my fitness pal, incr. act and start reg  exercise   Psychosocial Management   Psychosocial Assessment Re-assessment   Is there history of clinical depression or increased risk of depression? History of clinical depression  (on anti depressant medication)   Current Level of Stress per Patient Report Mild   Current Coping Skills Uses Stress Management/Relaxation Techniques;Has Positive Support System;Has Negative Coping Skills  (music, social media, TV)   Initial Patient Health Questionnaire -9 Score (PHQ-9) for depression. 5-9 Minimal symptoms, 10-14 Minor depression, 15-19 Major depression, moderately severe, > 20 Major depression, severe  6   Initial [facility] Survey score.  Quality of Life:   If total score > 25 review individual areas where patient rated a 4 or 5.  Consider patients current medical condition and what role that plays on the score.   Adjust treatment protocol to improve areas of concern.  Consider the following:  PHQ9 score, DASI, and re-assessment within the next 30 days to assist with developing treatments.  33   Stages of Change Maintenance   Interventions Planned Patient to verbalize understanding of behavioral assessment results;Patient to verbalize understanding of negative impact of stress to personal health;Patient will recognize signs and symptoms of depression;Patient interested in implementing one strategy to reduce current level of stress/anxiety;Patient to attend stress management class(es);Patient will practice coping/stress management techniques;Will reassess stage of  change at a later date. Patient currently in pre-contemplation for taking steps to improve and/or manage psychosocial health   Patient Goal Yes   Goal Description Pt would like to find positive replacement for smoking and eating for stress mgnt.    Goal Target Date 05/18/17   Psychosocial Comments main source of stress comes from disablity due to obesity   Other Core Components - Hypertension   History of or Diagnosis of Hypertension Yes   Currently taking Anti-Hypertensives Beta blocker;Nitrates   Other Core Components - Tobacco   History of Tobacco Use Yes   Tobacco Use Status Currently smoking - everyday   Tobacco Habit Cigarettes   Tobacco Use per Day (average) 5-10 per day   Years of Tobacco Use 20   Stages of Change Preparation   Other Core Components Summary   Interventions Planned None   Interventions In Progress or Completed Attended education class on Blood Pressure;Listed benefits of weight management;Educated on importance of maintaining low sodium diet;Educated on benefits of smoking cessation;Reassessed readiness to change and implemented appropriate process(es);Developed strategies to increase confidence to quit smoking;Initiated referral to [place] Quit Plan   Patient Goals Yes   Goal #1 Description Pt would like to set a quit date for smoking by developing resources and plan for replacement.    Goal #1 Target Date 05/18/17   Goal #1 Progress Towards Goal 3/23 Give Quit plan contact information   Activity/Exercise History   Activity/Exercise Assessment Initial   Activity/Exercise Status prior to event? Sedentary   Number of Days Currently participating in Moderate Physical Activity? 0   Number of Days Currently performing  Aerobic Exercise (including rehab)? 0   Number of Minutes per Session Currently of Aerobic Exercise (average)? 0   Current Stage of Change (Physical Activity) Preparation   Current Stage of Change (Aerobic Exercise) Preparation   Patient Goals Goal #1;Goal #2   Goal #1 Description Pt  would like to improve fitness to be able to walk for 20 min with one rest break, with stable cv response.    Goal #1 Target Date 05/18/17   Goal #2 Description Establish indep exercise program and doing ex on own 2-3 days per week.    Goal #2 Target Date 05/18/17   Exercise Assessment   6 Minute Walk Predicted - Gender Selection Female   6 Minute Walk Predicted (Male) 1282.99   6 Minute Walk Predicted (Female) 961.17   Initial 6 Minute Walk Distance (Feet) 132 ft   Resting HR 84 bpm   Exercise  bpm   Post Exercise HR 84 bpm   Resting /74   Exercise /65   Post Exercise /60   Pre  mg/dL   Effort Rating 9   Current MET Level 2.0   MET Level Goal 3   ECG Rhythm Sinus rhythm   Ectopy None   Current Symptoms Dyspnea;Fatigue;Joint pain   Limitations/Restrictions Orthopedic (see comments)  (chronic back pain)   Exercise Prescription   Mode Arm Ergometer;Weights  (scifit)   Duration/Time Intermittent bouts   Frequency 3 daysweek   THR (85% of age predicted max HR) 150.45   OMNI Effort Rating (0-10 Scale) 4-6/10   Progression Intermittent bouts;Aerobic exercise to OMNI rating of 5-7, and heart rate at or below target;Other (see comments)   Recommended Home Exercise   Type of Exercise Low Impact Calisthenics   Frequency (days per week) daily on off days from rehab   Duration (minutes per session) Intermittent   Effort Rating Recommended 4-6/10   30 Day Exercise Plan initiate low level calisthenics and /or walking, 2 min bouts   Current Home Exercise   Type of Exercise None   Follow-up/On-going Support   Provider follow-up needed on the following No follow-up needed   Learning Assessment   Learner Patient   Primary Language English   Preferred Learning Style Listening;Reading;Demonstration;Pictures/Video   Barriers to Learning No barriers noted   Patient Education   Education recommended Anatomy and Physiology of the Heart;Blood Pressure;Diabetes;Exercise Principles;Heart Failure;Medication  Overview;Muscle Conditioning;Nutrition;Smoking Cessation;Stress Management;Weight Loss   Physician cosignature/electronic signature indicates approval of this ITP document. I have established, reviewed and made necessary changes to the individualized treatment plan and exercise prescription for this patient.

## 2017-05-11 ENCOUNTER — CARE COORDINATION (OUTPATIENT)
Dept: CARDIOLOGY | Facility: CLINIC | Age: 42
End: 2017-05-11

## 2017-05-11 NOTE — PROGRESS NOTES
Pt called to make f/u appt with Dr. Alcala to discuss ziopatch results.  Spoke with pt today.  She mailed the monitor to [employer] on 5/9.  Informed pt it woild take 5-7 days to obtain results and once we have the results, we will make her f/u appt accordingly.  She understands and will wait for us to call with thresults.

## 2017-05-15 NOTE — PROGRESS NOTES
Cardiac Rehab Discharge Summary    Reason for discharge:    Multiple no-show cancels. Contacted pt after 2 week absence she explained her Diabetes was out of control.  She planned to return on5/10 but no showed and have not been able to reach her.    Progress towards goals:  Pt came to 3 sessions over 1 month. Minimal progress seen due to inconsistancy.     Recommendation(s):    Resume CR when pt stable and able to participate consistantly.

## 2017-05-15 NOTE — ADDENDUM NOTE
Encounter addended by: Su Vigil, OT on: 5/15/2017 12:29 PM<BR>     Actions taken: Sign clinical note, Episode resolved

## 2017-05-16 ENCOUNTER — ANTICOAGULATION THERAPY VISIT (OUTPATIENT)
Dept: ANTICOAGULATION | Facility: CLINIC | Age: 42
End: 2017-05-16

## 2017-05-16 ENCOUNTER — ALLIED HEALTH/NURSE VISIT (OUTPATIENT)
Dept: SURGERY | Facility: CLINIC | Age: 42
End: 2017-05-16

## 2017-05-16 DIAGNOSIS — Z79.01 LONG-TERM (CURRENT) USE OF ANTICOAGULANTS: Primary | ICD-10-CM

## 2017-05-16 DIAGNOSIS — Z79.01 LONG-TERM (CURRENT) USE OF ANTICOAGULANTS: ICD-10-CM

## 2017-05-16 DIAGNOSIS — I48.20 CHRONIC ATRIAL FIBRILLATION (H): ICD-10-CM

## 2017-05-16 LAB — INR PPP: 2.83 (ref 0.86–1.14)

## 2017-05-16 NOTE — MR AVS SNAPSHOT
Pricilla LEOPOLDO Brown   5/16/2017   Anticoagulation Therapy Visit    Description:  42 year old female   Provider:  Demi Kebede, RN   Department:  [facility]           INR as of 5/16/2017     Today's INR 2.83!      Anticoagulation Summary as of 5/16/2017     INR goal 2.0-2.5   Today's INR 2.83!   Full instructions 5/16: 12.5 mg; Otherwise 20 mg on Mon, Fri; 15 mg all other days   Next INR check 6/1/2017    Indications   Atrial fibrillation (H) [I48.91] [I48.91]  Long-term (current) use of anticoagulants [Z79.01] [Z79.01]         May 2017 Details    Sun Mon Tue Wed Thu Fri Sat      1               2               3               4               5               6                 7               8               9               10               11               12               13                 14               15               16      12.5 mg   See details      17      15 mg         18      15 mg         19      20 mg         20      15 mg           21      15 mg         22      20 mg         23      15 mg         24      15 mg         25      15 mg         26      20 mg         27      15 mg           28      15 mg         29      20 mg         30      15 mg         31      15 mg             Date Details   05/16 This INR check               How to take your warfarin dose     To take:  12.5 mg Take 2.5 of the 5 mg tablets.    To take:  15 mg Take 3 of the 5 mg tablets.    To take:  20 mg Take 4 of the 5 mg tablets.           June 2017 Details    Sun Mon Tue Wed Thu Fri Sat         1            2               3                 4               5               6               7               8               9               10                 11               12               13               14               15               16               17                 18               19               20               21               22               23               24                 25               26                27               28               29               30                 Date Details   No additional details    Date of next INR:  6/1/2017         How to take your warfarin dose     To take:  15 mg Take 3 of the 5 mg tablets.

## 2017-05-16 NOTE — PROGRESS NOTES
ANTICOAGULATION FOLLOW-UP CLINIC VISIT    Patient Name:  Pricilla Brown  Date:  5/16/2017  Contact Type:  Telephone    SUBJECTIVE:        OBJECTIVE    INR   Date Value Ref Range Status   05/16/2017 2.83 (H) 0.86 - 1.14 Final     Chromogenic Factor 10   Date Value Ref Range Status   04/08/2017 19 (L) 70 - 130 % Final     Comment:     Therapeutic Range:  A Chromogenic Factor 10 level of approximately 20-40%   inversely correlates with an INR of 2-3 for patients receiving Warfarin.   Chromogenic Factor 10 levels below 20% indicate an INR greater than 3 and   levels above 40% indicate an INR less than 2.         ASSESSMENT / PLAN  INR assessment SUPRA    Recheck INR In: 2 WEEKS    INR Location Clinic      Anticoagulation Summary as of 5/16/2017     INR goal 2.0-2.5   Today's INR 2.83!   Maintenance plan 20 mg (5 mg x 4) on Mon, Fri; 15 mg (5 mg x 3) all other days   Full instructions 5/16: 12.5 mg; Otherwise 20 mg on Mon, Fri; 15 mg all other days   Weekly total 115 mg   Plan last modified Velvet Asencio, RN (3/15/2017)   Next INR check 6/1/2017   Priority INR   Target end date Indefinite    Indications   Atrial fibrillation (H) [I48.91] [I48.91]  Long-term (current) use of anticoagulants [Z79.01] [Z79.01]         Anticoagulation Episode Summary     INR check location Clinic Lab    Preferred lab     Send INR reminders to [facility]    Comments Contact Patient at [phone]      Anticoagulation Care Providers     Provider Role Specialty Phone number    Percy Alcala MD  Cardiology [phone]            See the Encounter Report to view Anticoagulation Flowsheet and Dosing Calendar (Go to Encounters tab in chart review, and find the Anticoagulation Therapy Visit)    Left message for patient with results and dosing recommendations. Asked patient to call back to report any missed doses, falls, signs and symptoms of bleeding or clotting, any changes in health, medication, or diet. Asked patient to  call back with any questions or concerns.     Demi Kebede RN

## 2017-05-16 NOTE — PROGRESS NOTES
"  Bariatric Nutrition Consultation Note    Reason For Visit: Nutrition Reassessment    Pricilla Brown is a 42 year-old (type 2 DM on insulin sliding scale) presenting today for bariatric nutrition consult.  Pt is interested in laparoscopic sleeve gastrectomy.  Pt has met all required RD visits prior to surgery.  Pt was referred by NANETTE Kerr and Dr. Silva Damon.    ANTHROPOMETRICS:  Ht: 67\"  Initial Wt: 462.6 lbs with BMI of 72.6  Current Wt: 471.8 lbs (+3.4 lbs over the past month; +9.2 lbs from initial weight)     Required weight loss goal pre-op: -46 lbs from initial consult weight (goal weight 416.6 lbs or less before surgery)    NUTRITION HISTORY:  Food Allergies/Intolerances: none known  Cravings: sweets, chips, pop (diet)  Triggers/cues causing extra eating: boredom (currently on disability, not working)  *Pt is on topiramate.     Progress with Previous Goals:  Relating To Eating:  -Track food intake prior to eating on [email or web address].com with a target of no more than 1,800 Calories for 2 lbs/week weight loss, 2400 mg sodium or less/day would be great.- Inconsistently meeting, consuming around 6505-8660 Jagjit/day.    *Have 1 protein meal replacement shake daily (Premier [employer]).- Not meeting this past week due to needing to purchase more.  She was doing this 100% of the time previously.  She does want to continue.   *Eat as much non-starchy vegetables as you need to fill you up (no calorie-containing dips/condiments).- Meeting.     (non-starchy veg: green leafy vegetables, cucumbers, broccoli, cauliflower, green string beans)   *Have 1 fruit daily.- Not met due to her BGs been a little lower this past week (she is having about 2 fruits/day or so per her report).   *Drink at least 64 oz water between meals daily- Meeting.     -Eat slowly (20-30 minutes per meal), chewing foods well (25 chews per bite/applesauce consistency)- Meeting most of the time.     -Increase exercise as able.  Cardiac rehab " 3 days weekly for 60 min.- Pt has not been to cardiac rehab in a week, but plans to restart today.    Pt treats a low BG 1-2 times daily with juice (1-2 c) or an orange. This increases her sweet tooth when she has to do this per her report.     NUTRITION DIAGNOSIS:  Obesity r/t long history of self-monitoring deficit and excessive energy intake aeb BMI >30.- continues    INTERVENTION:  Intervention Provided/Education Provided: Discussed possible reasoning for lack of weight loss/weight gain and how to turn things around.  Advised she see her endocrinologist as soon as possible regarding potential need to decrease insulin so she won't have to treat low BG so much.  Once insulin is lowered, potentially a modified liquid diet may be considered if Dr. Silva Cuevas and Pt are in agreement.  Reviewed previous goals.  Discussed portion control, volumetric-type eating for satiety on less calories.  Emphasized the importance of mindful eating.  Pt desired to keep goals the same for now.  Gave encouragement and support.  Provided pt with list of goals.      Patient Understanding: good  Expected Compliance: good      GOALS:  Relating To Eating:  -Track food intake prior to eating on [email or web address].com with a target of no more than 1,800 Calories for 2 lbs/week weight loss, 2400 mg sodium or less/day would be great.   *Have 1 protein meal replacement shake daily (Premier Protein).   *Eat as much non-starchy vegetables as you need to fill you up (no calorie-containing dips/condiments).    (non-starchy veg: green leafy vegetables, cucumbers, broccoli, cauliflower, green string beans)   *Have 1 fruit daily.   *Drink at least 64 oz water between meals daily    -Eat slowly (20-30 minutes per meal), chewing foods well (25 chews per bite/applesauce consistency)    -Increase exercise as able.  Cardiac rehab 3 days weekly for 60 min.    Follow-Up: medical weight management/endocrinologist as soon as possible, weight check in 2  weeks, follow-up with RD in 1 month.     Time spent with patient: 30 minutes.  Paulette Ruiz MS, RDN, LDN, CLT  Pager: [phone]

## 2017-05-16 NOTE — MR AVS SNAPSHOT
MRN:[id number]                      After Visit Summary   5/16/2017    Pricilla Brown    MRN: [id number]           Visit Information        Provider Department      5/16/2017 10:30 AM Paulette Ruiz RD [facility] Surgical Weight Management        Your next 10 appointments already scheduled     May 16, 2017 11:00 AM CDT   LAB with [facility] LAB    Health Lab ([facility])    [address] [place] [address]  [address] [facility] [id number] [phone]           Patient must bring picture ID.  Patient should be prepared to give a urine specimen  Please do not eat 10-12 hours before your appointment if you are coming in fasting for labs on lipids, cholesterol, or glucose (sugar).  Pregnant women should follow their Care Team instructions. Water with medications is okay. Do not drink coffee or other fluids.   If you have concerns about taking  your medications, please ask at office or if scheduling via [employer], send a message by clicking on Secure Messaging, Message Your Care Team.            Jun 06, 2017  4:30 PM CDT   (Arrive by 4:15 PM)   Return Visit with Silva Damon MD   [facility] Medical Weight Management ([facility])    [address] [facility]  [address] [facility] [id number] [phone]            Mar 20, 2018  1:00 PM CDT   Return Sleep Patient with YOCASTA Horan [facility] [place], Sleep Study ([facility])    [address] [place] [id number] [phone]              Care Instructions    GOALS:  Relating To Eating:  -Track food intake prior to eating on [email or web address].[email or web address] with a target of no more than 1,800 Calories for 2 lbs/week weight loss, 2400 mg sodium or less/day would be great.   *Have 1 protein meal replacement shake daily (Premier Protein).   *Eat as much non-starchy vegetables as you need to fill you up (no calorie-containing  dips/condiments).    (non-starchy veg: green leafy vegetables, cucumbers, broccoli, cauliflower, green string beans)   *Have 1 fruit daily.   *Drink at least 64 oz water between meals daily    -Eat slowly (20-30 minutes per meal), chewing foods well (25 chews per bite/applesauce consistency)    -Increase exercise as able.  Cardiac rehab 3 days weekly for 60 min.      Follow-up in 2 weeks for a weight check and 4 weeks for a full appointment with Paulette (dietitian).       [employer] Information     [employer] gives you secure access to your electronic health record. If you see a primary care provider, you can also send messages to your care team and make appointments. If you have questions, please call your primary care clinic.  If you do not have a primary care provider, please call [phone] and they will assist you.      [employer] is an electronic gateway that provides easy, online access to your medical records. With [employer], you can request a clinic appointment, read your test results, renew a prescription or communicate with your care team.     To access your existing account, please contact your [facility] or call [phone] for assistance.        Care EveryWhere ID     This is your Care EveryWhere ID. This could be used by other organizations to access your [facility] medical records  [id number]

## 2017-05-18 ENCOUNTER — CARE COORDINATION (OUTPATIENT)
Dept: CARDIOLOGY | Facility: CLINIC | Age: 42
End: 2017-05-18

## 2017-05-18 NOTE — PROGRESS NOTES
Medical transportation form (MTM) filled out and fax'd to [phone] this afternoon.  Fax confirmation received.  Form to be scanned in EMR.

## 2017-05-26 DIAGNOSIS — B35.1 DERMATOPHYTOSIS OF NAIL: ICD-10-CM

## 2017-05-26 RX ORDER — CICLOPIROX 80 MG/ML
SOLUTION TOPICAL DAILY
Qty: 1 BOTTLE | Refills: 11 | Status: SHIPPED | OUTPATIENT
Start: 2017-05-26 | End: 2019-04-11

## 2017-05-31 ENCOUNTER — OFFICE VISIT (OUTPATIENT)
Dept: INTERNAL MEDICINE | Facility: CLINIC | Age: 42
End: 2017-05-31

## 2017-05-31 ENCOUNTER — ANTICOAGULATION THERAPY VISIT (OUTPATIENT)
Dept: ANTICOAGULATION | Facility: CLINIC | Age: 42
End: 2017-05-31

## 2017-05-31 VITALS
HEART RATE: 82 BPM | SYSTOLIC BLOOD PRESSURE: 128 MMHG | TEMPERATURE: 98.6 F | RESPIRATION RATE: 20 BRPM | DIASTOLIC BLOOD PRESSURE: 79 MMHG

## 2017-05-31 DIAGNOSIS — M79.672 LEFT FOOT PAIN: Primary | ICD-10-CM

## 2017-05-31 DIAGNOSIS — M79.672 LEFT FOOT PAIN: ICD-10-CM

## 2017-05-31 DIAGNOSIS — I48.20 CHRONIC ATRIAL FIBRILLATION (H): ICD-10-CM

## 2017-05-31 DIAGNOSIS — B35.3 TINEA PEDIS OF RIGHT FOOT: ICD-10-CM

## 2017-05-31 DIAGNOSIS — M10.9 ACUTE GOUTY ARTHRITIS: ICD-10-CM

## 2017-05-31 DIAGNOSIS — Z79.01 LONG-TERM (CURRENT) USE OF ANTICOAGULANTS: ICD-10-CM

## 2017-05-31 DIAGNOSIS — E11.42 TYPE 2 DIABETES MELLITUS WITH DIABETIC POLYNEUROPATHY, WITHOUT LONG-TERM CURRENT USE OF INSULIN (H): ICD-10-CM

## 2017-05-31 LAB
ANION GAP SERPL CALCULATED.3IONS-SCNC: 8 MMOL/L (ref 3–14)
BUN SERPL-MCNC: 17 MG/DL (ref 7–30)
CALCIUM SERPL-MCNC: 8.7 MG/DL (ref 8.5–10.1)
CHLORIDE SERPL-SCNC: 99 MMOL/L (ref 94–109)
CO2 SERPL-SCNC: 30 MMOL/L (ref 20–32)
CREAT SERPL-MCNC: 0.92 MG/DL (ref 0.52–1.04)
CRP SERPL-MCNC: 71.7 MG/L (ref 0–8)
ERYTHROCYTE [DISTWIDTH] IN BLOOD BY AUTOMATED COUNT: 15.4 % (ref 10–15)
GFR SERPL CREATININE-BSD FRML MDRD: 66 ML/MIN/1.7M2
GLUCOSE SERPL-MCNC: 200 MG/DL (ref 70–99)
HCT VFR BLD AUTO: 39.6 % (ref 35–47)
HGB BLD-MCNC: 12.7 G/DL (ref 11.7–15.7)
INR PPP: 2.44 (ref 0.86–1.14)
MCH RBC QN AUTO: 29.2 PG (ref 26.5–33)
MCHC RBC AUTO-ENTMCNC: 32.1 G/DL (ref 31.5–36.5)
MCV RBC AUTO: 91 FL (ref 78–100)
PLATELET # BLD AUTO: 337 10E9/L (ref 150–450)
POTASSIUM SERPL-SCNC: 3.4 MMOL/L (ref 3.4–5.3)
RBC # BLD AUTO: 4.35 10E12/L (ref 3.8–5.2)
SODIUM SERPL-SCNC: 136 MMOL/L (ref 133–144)
URATE SERPL-MCNC: 9.4 MG/DL (ref 2.6–6)
WBC # BLD AUTO: 13.6 10E9/L (ref 4–11)

## 2017-05-31 PROCEDURE — 84145 PROCALCITONIN (PCT): CPT | Performed by: INTERNAL MEDICINE

## 2017-05-31 RX ORDER — COLCHICINE 0.6 MG/1
.6-1.2 TABLET ORAL DAILY PRN
Qty: 12 TABLET | Refills: 1 | Status: SHIPPED | OUTPATIENT
Start: 2017-05-31 | End: 2017-08-28

## 2017-05-31 RX ORDER — CLOTRIMAZOLE 1 %
CREAM (GRAM) TOPICAL 2 TIMES DAILY
Qty: 30 G | Refills: 1 | Status: SHIPPED | OUTPATIENT
Start: 2017-05-31 | End: 2019-04-11

## 2017-05-31 ASSESSMENT — PAIN SCALES - GENERAL: PAINLEVEL: MODERATE PAIN (4)

## 2017-05-31 NOTE — MR AVS SNAPSHOT
After Visit Summary   5/31/2017    Pricilla Brown    MRN: [id number]           Patient Information     Date Of Birth          1975        Visit Information        Provider Department      5/31/2017 2:00 PM Griselda Jean Baptiste APRN CNP [facility]        Today's Diagnoses     Left foot pain    -  1    Acute gouty arthritis        Tinea pedis of right foot          Care Instructions    Primary Care Center Medication Refill Request Information:  * Please contact your pharmacy regarding ANY request for medication refills.  ** [facility] Prescription Fax = [phone]  * Please allow 3 business days for routine medication refills.  * Please allow 5 business days for controlled substance medication refills.     Primary Care Center Test Result notification information:  *You will be notified with in 7-10 days of your appointment day regarding the results of your test.  If you are on MyChart you will be notified as soon as the provider has reviewed the results and signed off on them.    Primary Care Center [phone]     At the first onset of gout attack, take 1.6 mg (2 tablets) of colchicine. You can take 1 tablet one hour later. Stop using that day if it causes diarrhea.       Gout Diet  Gout is a painful condition caused by an excess of uric acid, a waste product made by the body. Uric acid forms crystals that collect in the joints. This brings on symptoms of joint pain and swelling. This is called a gout attack. Often, medications and diet changes are combined to manage gout. Below are some guidelines for changing your diet to help you manage gout and prevent attacks. Your health care provider will help you determine the best eating plan for you.     Limiting or avoiding certain foods can help prevent gout attacks.   Eating to manage gout  Weight loss for those who are overweight may help reduce gout attacks.  Eat less of these foods  Eating too many foods containing purines may raise  the levels of uric acid in your body. This raises your risk for a gout attack. Try to limit these foods and drinks:    Alcohol, such as beer and red wine. You may be told to avoid alcohol completely.    Soft drinks that contain sugar or high fructose corn syrup    Certain fish, including anchovies, sardines, fish eggs, and herring    Certain meats, such as red meat, hot dogs, luncheon meats, and turkey    Organ meats, such as liver, kidneys, and sweetbreads    Legumes, such as dried beans and peas    Mushrooms, spinach, asparagus, and cauliflower    Other high fat foods such as gravy, whole milk, and high fat cheeses  Eat more of these foods  Other foods may be helpful for people with gout. Add some of these foods to your diet:    Dark berries, such as blueberries, blackberries, and cherries. These contain chemicals that may lower uric acid.    Tofu, a source of protein made from soy. Studies have shown that it may be a better choice than meat for people with gout.    Omega fatty acids. These are found in some fatty fish such as salmon, certain oils (flax, olive, or nut), and nuts themselves. Omega fatty acids may help prevent inflammation due to gout.    Dairy products that are low-fat or fat-free, such as cheese and yogurt    Complex carbohydrate foods, including whole grains, brown rice, oats, and beans    Coffee, in moderation  Follow-up care  Follow up with your health care provider, or as advised.  When to seek medical advice  Call your health care provider right away if any of these occur:    Return of gout symptoms, usually at night:    Severe pain, swelling, and heat in a joint, especially the base of the big toe    Affected joint is hard to move    Skin of the affected joint is purple or red    Fever of 100.4 F (38 C) or higher    Pain that doesn't get better even with prescribed medicine     2674-7329 [employer]. [address] [place] [address], [place] [address]. All rights reserved. This  information is not intended as a substitute for professional medical care. Always follow your healthcare professional's instructions.                Follow-ups after your visit        Your next 10 appointments already scheduled     May 31, 2017  3:30 PM CDT   LAB with  LAB   [facility] ([facility])    [address] [place] [id number] [phone]           Patient must bring picture ID.  Patient should be prepared to give a urine specimen  Please do not eat 10-12 hours before your appointment if you are coming in fasting for labs on lipids, cholesterol, or glucose (sugar).  Pregnant women should follow their Care Team instructions. Water with medications is okay. Do not drink coffee or other fluids.   If you have concerns about taking  your medications, please ask at office or if scheduling via [employer], send a message by clicking on Secure Messaging, Message Your Care Team.            May 31, 2017  4:45 PM CDT   XR FOOT LEFT 2 VIEWS with UCXR1   [facility] Imaging Center Xray ([facility])    [address] [place] [address] [id number] [phone]           Please bring a list of your current medicines to your exam. (Include vitamins, minerals and over-thecounter medicines.) Leave your valuables at home.  Tell your doctor if there is a chance you may be pregnant.  You do not need to do anything special for this exam.            Jun 01, 2017 11:00 AM CDT   Nurse Visit with  Surgery Nurse   General Surgery ([facility])    [address] [place] [id number] [phone]            Jun 06, 2017  4:30 PM CDT   (Arrive by 4:15 PM)   Return Visit with Silva Damon MD   [facility] Medical Weight Management ([facility] [place])    [address] [place] [address] [id number] [phone]            Jun 09, 2017  2:30 PM CDT   Return  Visit with GI [facility] ([facility])    [address] [facility] [address] [phone]            Jun 16, 2017 11:00 AM CDT   NUTRITION VISIT with YARY Dia [facility] Surgical Weight Management ([facility])    [address] [facility] [address] [phone]            Mar 20, 2018  1:00 PM CDT   Return Sleep Patient with YOCASTA Horan CNP   [facility] [place], Sleep Study ([facility])    [address] [facility] [address] [phone]              Future tests that were ordered for you today     Open Future Orders        Priority Expected Expires Ordered    CBC with platelets Routine 5/31/2017 6/14/2017 5/31/2017    Uric acid Routine 5/31/2017 5/31/2018 5/31/2017    CRP inflammation Routine 5/31/2017 5/31/2018 5/31/2017    Basic metabolic panel Routine 5/31/2017 6/14/2017 5/31/2017    XR Foot Left 2 Views Routine 5/31/2017 5/31/2018 5/31/2017            Who to contact     Please call your clinic at [phone] to:    Ask questions about your health    Make or cancel appointments    Discuss your medicines    Learn about your test results    Speak to your doctor   If you have compliments or concerns about an experience at your clinic, or if you wish to file a complaint, please contact [facility] Patient Relations at [phone] or email us at [email or web address]         Additional Information About Your Visit        MyChart Information     [employer] gives you secure access to your electronic health record. If you see a primary care provider, you can also send messages to your care team and make appointments. If you have questions, please call your primary care clinic.  If you do not have a primary care provider, please call [phone] and they will assist you.      MyChart  is an electronic gateway that provides easy, online access to your medical records. With [employer], you can request a clinic appointment, read your test results, renew a prescription or communicate with your care team.     To access your existing account, please contact your [facility] or call [phone] for assistance.        Care EveryWhere ID     This is your Care EveryWhere ID. This could be used by other organizations to access your [facility] medical records  [id number]        Your Vitals Were     Pulse Temperature Respirations             82 98.6  F (37  C) 20          Blood Pressure from Last 3 Encounters:   05/31/17 128/79   04/08/17 146/54   03/15/17 141/84    Weight from Last 3 Encounters:   05/09/17 (!) 211.1 kg (465 lb 8 oz)   04/06/17 (!) 219.5 kg (484 lb)   03/23/17 (!) 209.1 kg (461 lb)                 Today's Medication Changes          These changes are accurate as of: 5/31/17  3:16 PM.  If you have any questions, ask your nurse or doctor.               Start taking these medicines.        Dose/Directions    clotrimazole 1 % cream   Commonly known as:  LOTRIMIN   Used for:  Tinea pedis of right foot   Started by:  Griselda Jean Baptiste APRN CNP        Apply topically 2 times daily   Quantity:  30 g   Refills:  1       colchicine 0.6 MG tablet   Used for:  Acute gouty arthritis   Started by:  Griselda Jean Baptiste APRN CNP        Dose:  0.6-1.2 mg   Take 1-2 tablets (0.6-1.2 mg) by mouth daily as needed for moderate pain   Quantity:  12 tablet   Refills:  1       diclofenac 1 % Gel topical gel   Commonly known as:  VOLTAREN   Used for:  Left foot pain   Started by:  Griselda Jean Baptiste APRN CNP        Apply 4 grams to knees or 2 grams to hands four times daily using enclosed dosing card.   Quantity:  100 g   Refills:  1         Stop taking these medicines if you haven't already. Please contact your care team if you have questions.     oxyCODONE 10 MG IR tablet    Commonly known as:  ROXICODONE   Stopped by:  Griselda Jean Baptiste APRN CNP                Where to get your medicines      These medications were sent to [facility] - [place], [place] - [address]  [address], [facility] [address]    Hours:  TRANSPLANT PHONE NUMBER [phone] Phone:  [phone]     clotrimazole 1 % cream    colchicine 0.6 MG tablet    diclofenac 1 % Gel topical gel                Primary Care Provider Office Phone # Fax #    Jamilah Bernal [phone]       [address]  [facility] [address]        Thank you!     Thank you for choosing [facility]  for your care. Our goal is always to provide you with excellent care. Hearing back from our patients is one way we can continue to improve our services. Please take a few minutes to complete the written survey that you may receive in the mail after your visit with us. Thank you!             Your Updated Medication List - Protect others around you: Learn how to safely use, store and throw away your medicines at www.disposemymeds.org.          This list is accurate as of: 5/31/17  3:16 PM.  Always use your most recent med list.                   Brand Name Dispense Instructions for use    acetaminophen 325 MG tablet    TYLENOL    180 tablet    Take 2 tablets (650 mg) by mouth 3 times daily as needed for mild pain or fever (total acetaminophen dose should not exceed 3000 mg per day)       albuterol 108 (90 BASE) MCG/ACT Inhaler    PROAIR HFA/PROVENTIL HFA/VENTOLIN HFA    1 Inhaler    Inhale 2 puffs into the lungs every 6 hours as needed.       bisacodyl 10 MG Suppository    DULCOLAX    6 suppository    Place 1 suppository (10 mg) rectally daily as needed for constipation       * blood glucose monitoring lancets     1 Box    Use to test blood sugars 4 times daily or as directed.       * blood glucose monitoring lancets     4 Box     Use to test blood sugar 4 times daily or as directed.       blood glucose monitoring meter device kit     1 kit    Use to test blood sugars 2 times daily or as directed.       * blood glucose monitoring test strip    ACCU-CHEK RON    100 strip    Use to test blood sugars 2 times daily or as directed.       * blood glucose monitoring test strip    ACCU-CHEK SMARTVIEW    450 each    Use to test blood sugar 5 times daily       buPROPion 100 MG 12 hr tablet    WELLBUTRIN SR    60 tablet    Take 1 tablet (100 mg) by mouth 2 times daily       capsaicin 0.025 % Crea cream    ZOSTRIX    1 Tube    Apply 1 g topically 3 times daily as needed       ciclopirox 8 % Soln     1 Bottle    Externally apply topically daily To toenails.       clotrimazole 1 % cream    LOTRIMIN    30 g    Apply topically 2 times daily       colchicine 0.6 MG tablet     12 tablet    Take 1-2 tablets (0.6-1.2 mg) by mouth daily as needed for moderate pain       diazepam 5 MG tablet    VALIUM    1 tablet    Hand carry to procedure on 2/21. Do not take until instructed.       diclofenac 1 % Gel topical gel    VOLTAREN    100 g    Apply 4 grams to knees or 2 grams to hands four times daily using enclosed dosing card.       DULoxetine 20 MG EC capsule    CYMBALTA    60 capsule    Take 1 capsule (20 mg) by mouth 2 times daily       Efinaconazole 10 % Soln     8 mL    Externally apply topically daily To toenails.       gabapentin 300 MG capsule    NEURONTIN    360 capsule    Take 2 capsules (600 mg) by mouth 2 times daily       hydrALAZINE 25 MG tablet    APRESOLINE    150 tablet    Take 1 tab (25 mg) in am, 2 tabs (50 mg) midday, and 2 tabs (50 mg) in pm daily       insulin glargine U-300 300 UNIT/ML injection    TOUJEO    15 mL    Inject 160 units SQ each am.       * insulin pen needle 30G X 8 MM    NOVOFINE    100 each    Use 2X daily or as directed.       * insulin pen needle 32G X 4 MM    BD RIVER U/F    200 each    Use 6 daily or as directed.        "insulin syringe-needle U-100 30G X 1/2\" 0.5 ML    BD insulin syringe ULTRAFINE    100 each    Use one syringe 3 daily or as directed.       levothyroxine 200 MCG tablet    SYNTHROID/LEVOTHROID    90 tablet    Take 1 tablet (200 mcg) by mouth daily       liraglutide 18 MG/3ML soln    VICTOZA PEN    27 mL    Inject 1.8 mg Subcutaneous daily       metoprolol 50 MG 24 hr tablet    TOPROL-XL    90 tablet    Take 0.5 tablets (25 mg) by mouth At Bedtime       morphine 15 MG 12 hr tablet    MS CONTIN     Take 15 mg by mouth daily as needed       Nebulizer Compressor Kit     1 kit    1 Device 4 times daily as needed.       nicotine 14 MG/24HR 24 hr patch    [employer] NICOTINE    30 patch    Place 1 patch onto the skin every 24 hours       NovoLOG FLEXPEN 100 UNIT/ML injection   Generic drug:  insulin aspart     60 mL    Inject 60 units with meals plus correction. Pt uses approx 180 units in 24 hrs.       nystatin cream    MYCOSTATIN    90 g    Apply topically 2 times daily To toenails       order for DME     1 each    Use your CPAP device as directed by your provider. Pressure change to min 13 max 18cwp       * order for DME     1 each    Equipment being ordered: Challenger Wide walker if available - patient needs seat, basket and brakes.       * order for DME     1 each    Equipment being ordered:  [id number] $92  Plantar, fasciitis, LG, night splint       * order for DME     1 Units    Left foot       oxyCODONE-acetaminophen 5-325 MG per tablet    PERCOCET    60 tablet    Take 1 tablet by mouth every 8 hours as needed for moderate to severe pain (try to limit use, no further prescriptions until seen in pain clinic)       polyethylene glycol powder    MIRALAX/GLYCOLAX         potassium chloride SA 20 MEQ CR tablet    potassium chloride    240 tablet    Take 2 tablets (40 mEq) by mouth 4 times daily       senna 8.6 MG tablet    SENOKOT    45 tablet    Take 1 tablet by mouth 2 times daily       spironolactone 50 MG tablet    " ALDACTONE    30 tablet    Take 1 tablet (50 mg) by mouth daily       tiotropium 18 MCG capsule    SPIRIVA HANDIHALER    30 capsule    Inhale contents of one capsule daily.       topiramate 25 MG tablet    TOPAMAX    180 tablet    Take 3 tablets (75 mg) by mouth 2 times daily       torsemide 100 MG tablet    DEMADEX    180 tablet    Take 1 tablet (100 mg) by mouth 2 times daily       warfarin 5 MG tablet    COUMADIN    30 tablet    Take 2 tablets (10 mg) by mouth once for 1 dose Take 15 mg PO daily on 10/20, 10/21, 10/22. Check INR on 10/23. Further dosing per INR level.       * Notice:  This list has 9 medication(s) that are the same as other medications prescribed for you. Read the directions carefully, and ask your doctor or other care provider to review them with you.

## 2017-05-31 NOTE — PROGRESS NOTES
ANTICOAGULATION FOLLOW-UP CLINIC VISIT    Patient Name:  Pricilla Brown  Date:  5/31/2017  Contact Type:  Telephone    SUBJECTIVE:     Patient Findings     Positives No Problem Findings           OBJECTIVE    INR   Date Value Ref Range Status   05/31/2017 2.44 (H) 0.86 - 1.14 Final     Chromogenic Factor 10   Date Value Ref Range Status   04/08/2017 19 (L) 70 - 130 % Final     Comment:     Therapeutic Range:  A Chromogenic Factor 10 level of approximately 20-40%   inversely correlates with an INR of 2-3 for patients receiving Warfarin.   Chromogenic Factor 10 levels below 20% indicate an INR greater than 3 and   levels above 40% indicate an INR less than 2.         ASSESSMENT / PLAN  INR assessment THER    Recheck INR In: 3 WEEKS    INR Location Clinic      Anticoagulation Summary as of 5/31/2017     INR goal 2.0-2.5   Today's INR 2.44   Maintenance plan 20 mg (5 mg x 4) on Mon, Fri; 15 mg (5 mg x 3) all other days   Full instructions 20 mg on Mon, Fri; 15 mg all other days   Weekly total 115 mg   Plan last modified Velvet Asencio RN (3/15/2017)   Next INR check 6/21/2017   Priority INR   Target end date Indefinite    Indications   Atrial fibrillation (H) [I48.91] [I48.91]  Long-term (current) use of anticoagulants [Z79.01] [Z79.01]         Anticoagulation Episode Summary     INR check location Clinic Lab    Preferred lab     Send INR reminders to [facility]    Comments Contact Patient at [phone]      Anticoagulation Care Providers     Provider Role Specialty Phone number    Percy Alcala MD  Cardiology [phone]            See the Encounter Report to view Anticoagulation Flowsheet and Dosing Calendar (Go to Encounters tab in chart review, and find the Anticoagulation Therapy Visit)  Spoke with patient.    Dorene Edge RN

## 2017-05-31 NOTE — NURSING NOTE
Chief Complaint   Patient presents with     Musculoskeletal Problem     on and off foot issues     RAY ROSS at 2:49 PM on 5/31/2017.

## 2017-05-31 NOTE — PATIENT INSTRUCTIONS
[facility] Medication Refill Request Information:  * Please contact your pharmacy regarding ANY request for medication refills.  ** [facility] Prescription Fax = [phone]  * Please allow 3 business days for routine medication refills.  * Please allow 5 business days for controlled substance medication refills.     [facility] Test Result notification information:  *You will be notified with in 7-10 days of your appointment day regarding the results of your test.  If you are on MyChart you will be notified as soon as the provider has reviewed the results and signed off on them.    [facility] [phone]     At the first onset of gout attack, take 1.6 mg (2 tablets) of colchicine. You can take 1 tablet one hour later. Stop using that day if it causes diarrhea.       Gout Diet  Gout is a painful condition caused by an excess of uric acid, a waste product made by the body. Uric acid forms crystals that collect in the joints. This brings on symptoms of joint pain and swelling. This is called a gout attack. Often, medications and diet changes are combined to manage gout. Below are some guidelines for changing your diet to help you manage gout and prevent attacks. Your health care provider will help you determine the best eating plan for you.     Limiting or avoiding certain foods can help prevent gout attacks.   Eating to manage gout  Weight loss for those who are overweight may help reduce gout attacks.  Eat less of these foods  Eating too many foods containing purines may raise the levels of uric acid in your body. This raises your risk for a gout attack. Try to limit these foods and drinks:    Alcohol, such as beer and red wine. You may be told to avoid alcohol completely.    Soft drinks that contain sugar or high fructose corn syrup    Certain fish, including anchovies, sardines, fish eggs, and herring    Certain meats, such as red meat, hot dogs, luncheon meats, and turkey    Organ meats, such  as liver, kidneys, and sweetbreads    Legumes, such as dried beans and peas    Mushrooms, spinach, asparagus, and cauliflower    Other high fat foods such as gravy, whole milk, and high fat cheeses  Eat more of these foods  Other foods may be helpful for people with gout. Add some of these foods to your diet:    Dark berries, such as blueberries, blackberries, and cherries. These contain chemicals that may lower uric acid.    Tofu, a source of protein made from soy. Studies have shown that it may be a better choice than meat for people with gout.    Omega fatty acids. These are found in some fatty fish such as salmon, certain oils (flax, olive, or nut), and nuts themselves. Omega fatty acids may help prevent inflammation due to gout.    Dairy products that are low-fat or fat-free, such as cheese and yogurt    Complex carbohydrate foods, including whole grains, brown rice, oats, and beans    Coffee, in moderation  Follow-up care  Follow up with your health care provider, or as advised.  When to seek medical advice  Call your health care provider right away if any of these occur:    Return of gout symptoms, usually at night:    Severe pain, swelling, and heat in a joint, especially the base of the big toe    Affected joint is hard to move    Skin of the affected joint is purple or red    Fever of 100.4 F (38 C) or higher    Pain that doesn't get better even with prescribed medicine     [id number] [employer]. [address] [place] [address], [place] [address]. All rights reserved. This information is not intended as a substitute for professional medical care. Always follow your healthcare professional's instructions.

## 2017-05-31 NOTE — PROGRESS NOTES
Pricilla Brown is a 42 year old female who comes in for    CC: foot pain  HPI:  Ms. Brown reports recurrent L foot pain. This started last night, primarily on the side of the big toe and across the end of the foot and side of her pinky toe. She denies recent or prior injury. She was unable to put her sock on due to the pain.  This also occurred Oct. 2016 in the L foot There was concern for DVT, but a US was negative. The pain returned in April on the R foot. She was hospitalized the last two times she had this foot pain and is hoping to avoid this. She has never learned the cause of the pain--just had to use ice packs and pain medication.     No fever or other symptoms. Mild cough.    Other issues discussed today:     Patient Active Problem List   Diagnosis     Chronic atrial fibrillation (HCC)     Dilated cardiomyopathy (H)     Morbid obesity (H)     Diabetes mellitus, type 2 (H)     Chronic diastolic heart failure (H)     Chronic anticoagulation for a-fib     JYOTI (obstructive sleep apnea) CPAP Min Pressure of 13 and Max Pressure of 18     Hypothyroidism following radioiodine therapy     SOB (shortness of breath)     Azotemia     Asthma     Peritonsillar abscess     Plantar fasciitis     Neuropathic pain of lower extremity     Atrial fibrillation (H) [I48.91]     Long-term (current) use of anticoagulants [Z79.01]     Cellulitis     Chest pain     Foot pain       Current Outpatient Prescriptions   Medication Sig Dispense Refill     ciclopirox 8 % SOLN Externally apply topically daily To toenails. 1 Bottle 11     Efinaconazole 10 % SOLN Externally apply topically daily To toenails. 8 mL 11     insulin glargine U-300 (TOUJEO) 300 UNIT/ML injection Inject 160 units SQ each am. 15 mL 3     gabapentin (NEURONTIN) 300 MG capsule Take 2 capsules (600 mg) by mouth 2 times daily 360 capsule 2     acetaminophen (TYLENOL) 325 MG tablet Take 2 tablets (650 mg) by mouth 3 times daily as needed for mild pain or fever (total  acetaminophen dose should not exceed 3000 mg per day) 180 tablet 5     potassium chloride SA (POTASSIUM CHLORIDE) 20 MEQ CR tablet Take 2 tablets (40 mEq) by mouth 4 times daily 240 tablet 3     blood glucose monitoring (ACCU-CHEK FASTCLIX) lancets Use to test blood sugar 4 times daily or as directed. 4 Box 2     blood glucose monitoring (ACCU-CHEK SMARTVIEW) test strip Use to test blood sugar 5 times daily 450 each 3     diazepam (VALIUM) 5 MG tablet Hand carry to procedure on 2/21. Do not take until instructed. 1 tablet 0     torsemide (DEMADEX) 100 MG tablet Take 1 tablet (100 mg) by mouth 2 times daily 180 tablet 1     NOVOLOG FLEXPEN 100 UNIT/ML soln Inject 60 units with meals plus correction. Pt uses approx 180 units in 24 hrs. 60 mL 11     polyethylene glycol (MIRALAX/GLYCOLAX) powder        topiramate (TOPAMAX) 25 MG tablet Take 3 tablets (75 mg) by mouth 2 times daily 180 tablet 3     levothyroxine (SYNTHROID/LEVOTHROID) 200 MCG tablet Take 1 tablet (200 mcg) by mouth daily 90 tablet 3     morphine (MS CONTIN) 15 MG 12 hr tablet Take 15 mg by mouth daily as needed       buPROPion (WELLBUTRIN SR) 100 MG 12 hr tablet Take 1 tablet (100 mg) by mouth 2 times daily 60 tablet 3     hydrALAZINE (APRESOLINE) 25 MG tablet Take 1 tab (25 mg) in am, 2 tabs (50 mg) midday, and 2 tabs (50 mg) in pm daily 150 tablet 3     order for DME Left foot 1 Units 0     liraglutide (VICTOZA PEN) 18 MG/3ML soln Inject 1.8 mg Subcutaneous daily 27 mL 3     diclofenac (VOLTAREN) 1 % GEL Place 4 g onto the skin 4 times daily 100 g 0     metoprolol (TOPROL-XL) 50 MG 24 hr tablet Take 0.5 tablets (25 mg) by mouth At Bedtime 90 tablet 3     order for DME Equipment being ordered: [id number] $92   Plantar, fasciitis, LG, night splint 1 each 0     capsaicin (ZOSTRIX) 0.025 % CREA Apply 1 g topically 3 times daily as needed 1 Tube 0     order for DME Equipment being ordered: Challenger Wide walker if available - patient needs seat,  "basket and brakes. 1 each 0     nicotine ([employer] NICOTINE) 14 MG/24HR patch 2h hr Place 1 patch onto the skin every 24 hours 30 patch 1     insulin pen needle (BD RIVER U/F) 32G X 4 MM Use 6 daily or as directed. 200 each 11     nystatin (MYCOSTATIN) cream Apply topically 2 times daily To toenails 90 g 6     insulin pen needle (NOVOFINE) 30G X 8 MM Use 2X daily or as directed. 100 each 3     spironolactone (ALDACTONE) 50 MG tablet Take 1 tablet (50 mg) by mouth daily 30 tablet 11     insulin syringe-needle U-100 (BD INSULIN SYRINGE ULTRAFINE) 30G X 1/2\" 0.5 ML Use one syringe 3 daily or as directed. 100 each prn     blood glucose monitoring (ONE TOUCH DELICA) lancets Use to test blood sugars 4 times daily or as directed. 1 Box prn     oxyCODONE-acetaminophen (PERCOCET) 5-325 MG per tablet Take 1 tablet by mouth every 8 hours as needed for moderate to severe pain (try to limit use, no further prescriptions until seen in pain clinic) 60 tablet 0     blood glucose (ACCU-CHEK RON) test strip Use to test blood sugars 2 times daily or as directed. 100 strip 11     DULoxetine (CYMBALTA) 20 MG capsule Take 1 capsule (20 mg) by mouth 2 times daily 60 capsule 0     senna (SENOKOT) 8.6 MG tablet Take 1 tablet by mouth 2 times daily 45 tablet 0     bisacodyl (DULCOLAX) 10 MG suppository Place 1 suppository (10 mg) rectally daily as needed for constipation 6 suppository 0     Blood Glucose Monitoring Suppl (ACCU-CHEK RON PLUS) W/DEVICE KIT Use to test blood sugars 2 times daily or as directed. 1 kit 0     tiotropium (SPIRIVA HANDIHALER) 18 MCG inhalation capsule Inhale contents of one capsule daily. 30 capsule 1     Respiratory Therapy Supplies (NEBULIZER COMPRESSOR) KIT 1 Device 4 times daily as needed. 1 kit 3     ORDER FOR DME Use your CPAP device as directed by your provider. Pressure change to min 13 max 18cwp 1 each 99     albuterol (PROAIR HFA, PROVENTIL HFA, VENTOLIN HFA) 108 (90 BASE) MCG/ACT inhaler Inhale 2 puffs into " the lungs every 6 hours as needed. 1 Inhaler 11     warfarin (COUMADIN) 5 MG tablet Take 2 tablets (10 mg) by mouth once for 1 dose Take 15 mg PO daily on 10/20, 10/21, 10/22. Check INR on 10/23. Further dosing per INR level. 30 tablet 0         ALLERGIES: Penicillins and Ibuprofen sodium    PAST MEDICAL HX:   Past Medical History:   Diagnosis Date     A-fib (H)     on coumadin since      Asthma     as a kid     Chest pain 2017     Chronic anticoagulation for a-fib 2/15/2013    INR's followed [facility]     Diabetes mellitus (H)      Diastolic heart failure 2/15/2013     Dilated cardiomyopathy (H) 2013     HTN (hypertension)      Hyperthyroidism     [place], s/p I131 , now on prednisone and methimazole     Morbid obesity (H)      Pulmonary embolism (H)     hospitalized in [place], on lovenox/coumadin for a few months but stopped, hypercoag w/u neg per pt     Sleep apnea     using CPAP       PAST SURGICAL HX: No past surgical history on file.    IMMUNIZATION HX:   Immunization History   Administered Date(s) Administered     Influenza (IIV3) 10/05/2012, 2014     Mantoux 2013     Pneumococcal 23 valent 2014     TDAP Vaccine (Boostrix) 2013       SOCIAL HX:   Social History     Social History Narrative    Single, no children        Gyn:        Last pap several years ago, no abnormal    No STIs            Patient is single.  She is no longer working.  She used to work in the area of customer service.  She is currently living with her sister in [place], [place].  She has no pets.  Patient has smoked a half pack of cigarettes a day for the past 10 plus years.  She states that she is down to 5 cigarettes a day with the aid of Wellbutrin.  She does not smoke cigars, pipes or chew tobacco.  She has 1 cup of coffee in the morning.  She does not drink alcohol.  Patient does not exercise.        ROS:   CONSTITUTIONAL: no fatigue, no unexpected change in  weight  SKIN: no worrisome rashes, no worrisome moles, no worrisome lesions  RESP: no significant cough, no shortness of breath  CV: no chest pain, no palpitations, no new or worsening peripheral edema  GI: no nausea, no vomiting, no constipation, no diarrhea  MUSCULOSKELETAL:see HPI    OBJECTIVE:  /79  Pulse 82  Temp 98.6  F (37  C)  Resp 20   Wt Readings from Last 1 Encounters:   05/09/17 (!) 211.1 kg (465 lb 8 oz)     Constitutional: no acute distress, comfortable, pleasant, well-groomed, sitting in wheelchair  Cardiovascular: regular rate and rhythm, normal S1 and S2, no murmurs, rubs or gallops, DP/PT pulses full and symmetric, cap refill <2 sec  Respiratory: clear to auscultation with good air movement bilaterally, no wheezes or crackles, non-labored  Musculoskeletal: full range of motion, no edema, extreme tenderness to light palpation in 1st and 5th MTP joints, with tenderness 2nd-4th MTP joints.  Skin: no concerning lesions, R foot with peeling skin, no jaundice, temp normal, no erythema or edema in feet  Psychological: appropriate mood, demonstrates intact judgment and logical thought process  Foot Exam:  normal DP and PT pulses, no trophic changes or ulcerative lesions, normal sensory exam and dry cracking heels, see above      ASSESSMENT/PLAN:    1. Left foot pain  Will recheck labs today for acute infectious process; labs stable from previous episodes of foot pain.     - diclofenac (VOLTAREN) 1 % GEL topical gel; Apply 4 grams to knees or 2 grams to hands four times daily using enclosed dosing card.  Dispense: 100 g; Refill: 1  - CBC with platelets; Future  - CRP inflammation; Future  - Basic metabolic panel; Future  - XR Foot Left G/E 3 Views; Future    2. Acute gouty arthritis  Will start colchicine for suspected gout flare. Reviewed dietary management to help prevent future attacks. May apply Voltaren gel for pain management on the site. Apply cold packs to help reduce pain. Consider steroid  burst if no improvement with colchicine.   - colchicine 0.6 MG tablet; Take 1-2 tablets (0.6-1.2 mg) by mouth daily as needed for moderate pain  Dispense: 12 tablet; Refill: 1  - Uric acid; Future    3. Tinea pedis of right foot  Recommended Lotrimin to peeling skin between toes on R foot.  - clotrimazole (LOTRIMIN) 1 % cream; Apply topically 2 times daily  Dispense: 30 g; Refill: 1    4. Type 2 diabetes mellitus with diabetic polyneuropathy, without long-term current use of insulin (H)  Foot exam completed.    FOLLOW UP: If not improving or if worsening, or as needed  YOCASTA Rodrigues CNP

## 2017-05-31 NOTE — MR AVS SNAPSHOT
Pricilla Brown   5/31/2017   Anticoagulation Therapy Visit    Description:  42 year old female   Provider:  Dorene Edge, RN   Department:  [facility]           INR as of 5/31/2017     Today's INR 2.44      Anticoagulation Summary as of 5/31/2017     INR goal 2.0-2.5   Today's INR 2.44   Full instructions 20 mg on Mon, Fri; 15 mg all other days   Next INR check 6/21/2017    Indications   Atrial fibrillation (H) [I48.91] [I48.91]  Long-term (current) use of anticoagulants [Z79.01] [Z79.01]         May 2017 Details    Sun Mon Tue Wed Thu Fri Sat      1               2               3               4               5               6                 7               8               9               10               11               12               13                 14               15               16               17               18               19               20                 21               22               23               24               25               26               27                 28               29               30               31      15 mg   See details          Date Details   05/31 This INR check               How to take your warfarin dose     To take:  15 mg Take 3 of the 5 mg tablets.           June 2017 Details    Sun Mon Tue Wed Thu Fri Sat         1      15 mg         2      20 mg         3      15 mg           4      15 mg         5      20 mg         6      15 mg         7      15 mg         8      15 mg         9      20 mg         10      15 mg           11      15 mg         12      20 mg         13      15 mg         14      15 mg         15      15 mg         16      20 mg         17      15 mg           18      15 mg         19      20 mg         20      15 mg         21            22               23               24                 25               26               27               28               29               30                 Date Details   No  additional details    Date of next INR:  6/21/2017         How to take your warfarin dose     To take:  15 mg Take 3 of the 5 mg tablets.    To take:  20 mg Take 4 of the 5 mg tablets.

## 2017-06-01 DIAGNOSIS — Z79.01 LONG TERM CURRENT USE OF ANTICOAGULANT THERAPY: Primary | ICD-10-CM

## 2017-06-01 LAB — PROCALCITONIN SERPL-MCNC: NORMAL NG/ML

## 2017-06-01 RX ORDER — WARFARIN SODIUM 5 MG/1
TABLET ORAL
Qty: 100 TABLET | Refills: 6 | Status: SHIPPED | OUTPATIENT
Start: 2017-06-01 | End: 2017-07-21

## 2017-06-14 DIAGNOSIS — Z72.0 TOBACCO ABUSE: ICD-10-CM

## 2017-06-14 RX ORDER — BUPROPION HYDROCHLORIDE 100 MG/1
100 TABLET, EXTENDED RELEASE ORAL 2 TIMES DAILY
Qty: 180 TABLET | Refills: 1 | Status: SHIPPED | OUTPATIENT
Start: 2017-06-14 | End: 2017-12-14

## 2017-06-14 NOTE — TELEPHONE ENCOUNTER
wellbutrin  Last Written Prescription Date:  12/14/16  Last Fill Quantity: 60,   # refills: 3  Last Office Visit : 11/14/16 brief visit 5/31/17  Future Office visit:  no

## 2017-06-26 DIAGNOSIS — E11.9 DIABETES MELLITUS, TYPE 2 (H): ICD-10-CM

## 2017-07-05 DIAGNOSIS — E11.9 DIABETES MELLITUS, TYPE 2 (H): ICD-10-CM

## 2017-07-06 ENCOUNTER — TELEPHONE (OUTPATIENT)
Dept: ENDOCRINOLOGY | Facility: CLINIC | Age: 42
End: 2017-07-06

## 2017-07-06 NOTE — TELEPHONE ENCOUNTER
topiramate  Last Written Prescription Date:  2/3/17  Last Fill Quantity: 180,   # refills: 3  Last Office Visit : 2/3/17  Future Office visit:  No pending appt    Routing refill request to provider for review/approval because:  Clinic appt needed

## 2017-07-08 ENCOUNTER — HEALTH MAINTENANCE LETTER (OUTPATIENT)
Age: 42
End: 2017-07-08

## 2017-07-19 DIAGNOSIS — E66.01 MORBID OBESITY, UNSPECIFIED OBESITY TYPE (H): ICD-10-CM

## 2017-07-19 RX ORDER — TOPIRAMATE 25 MG/1
75 TABLET, FILM COATED ORAL 2 TIMES DAILY
Qty: 180 TABLET | Refills: 1 | Status: SHIPPED | OUTPATIENT
Start: 2017-07-19 | End: 2017-10-27

## 2017-07-19 NOTE — TELEPHONE ENCOUNTER
topomax    Last Written Prescription Date:  2/3/17  Last Fill Quantity: 180,   # refills: 3  Last Office Visit : 2/3/17  Future Office visit:  9/12/17    Routing refill request to provider for review/approval because:  Pt has had several no show appt, routing to MD for refill approval

## 2017-07-21 ENCOUNTER — TELEPHONE (OUTPATIENT)
Dept: ENDOCRINOLOGY | Facility: CLINIC | Age: 42
End: 2017-07-21

## 2017-07-21 DIAGNOSIS — I42.0 DILATED CARDIOMYOPATHY (H): ICD-10-CM

## 2017-07-21 DIAGNOSIS — E87.6 HYPOKALEMIA: ICD-10-CM

## 2017-07-21 DIAGNOSIS — Z79.01 LONG TERM CURRENT USE OF ANTICOAGULANT THERAPY: ICD-10-CM

## 2017-07-21 DIAGNOSIS — E11.9 DIABETES MELLITUS, TYPE 2 (H): Primary | ICD-10-CM

## 2017-07-21 RX ORDER — HYDRALAZINE HYDROCHLORIDE 25 MG/1
TABLET, FILM COATED ORAL
Qty: 150 TABLET | Refills: 11 | Status: SHIPPED | OUTPATIENT
Start: 2017-07-21 | End: 2017-08-17

## 2017-07-21 RX ORDER — WARFARIN SODIUM 5 MG/1
TABLET ORAL
Qty: 100 TABLET | Refills: 6 | Status: SHIPPED | OUTPATIENT
Start: 2017-07-21 | End: 2018-07-11 | Stop reason: DRUGHIGH

## 2017-07-21 RX ORDER — POTASSIUM CHLORIDE 1500 MG/1
40 TABLET, EXTENDED RELEASE ORAL 4 TIMES DAILY
Qty: 240 TABLET | Refills: 3 | Status: SHIPPED | OUTPATIENT
Start: 2017-07-21 | End: 2018-07-25

## 2017-07-21 NOTE — TELEPHONE ENCOUNTER
[facility] Call Center    Phone Message    Name of Caller: Janay    Phone Number: Other phone number:  [phone]*  Best time to return call: Any    May a detailed message be left on voicemail: no    Relation to patient: Other Name: Janay  Relationship: [employer] [employer]  Is there legal documentation in chart to discuss information with this person: NA  Reason for Call: Other: Janay is calling to request a 90 refill for   topiramate (TOPAMAX) 25 MG tablet [8617] (Order [id number]) . She states the patient's insurance will cover that . They would like a new Rx be sent to them. Please advise.     Action Taken: Message routed to:  Adult Clinics: Endocrinology p [phone]

## 2017-07-24 RX ORDER — INSULIN ASPART 100 [IU]/ML
INJECTION, SOLUTION INTRAVENOUS; SUBCUTANEOUS
Qty: 165 ML | Refills: 3 | Status: CANCELLED | OUTPATIENT
Start: 2017-07-24

## 2017-07-26 ENCOUNTER — ANTICOAGULATION THERAPY VISIT (OUTPATIENT)
Dept: ANTICOAGULATION | Facility: CLINIC | Age: 42
End: 2017-07-26

## 2017-07-26 DIAGNOSIS — I48.91 ATRIAL FIBRILLATION (H): ICD-10-CM

## 2017-07-26 DIAGNOSIS — Z79.01 LONG-TERM (CURRENT) USE OF ANTICOAGULANTS: ICD-10-CM

## 2017-07-26 NOTE — MR AVS SNAPSHOT
Pricilla Brown   7/26/2017   Anticoagulation Therapy Visit    Description:  42 year old female   Provider:  Blayne Loyd, formerly [facility]   Department:  [facility]           INR as of 7/26/2017     Today's INR No new INR was available at the time of this encounter.      Anticoagulation Summary as of 7/26/2017     INR goal 2.0-2.5   Today's INR No new INR was available at the time of this encounter.   Full instructions 20 mg on Mon, Fri; 15 mg all other days   Next INR check 8/7/2017    Indications   Atrial fibrillation (H) [I48.91] [I48.91]  Long-term (current) use of anticoagulants [Z79.01] [Z79.01]         July 2017 Details    Sun Mon Tue Wed Thu Fri Sat           1                 2               3               4               5               6               7               8                 9               10               11               12               13               14               15                 16               17               18               19               20               21               22                 23               24               25               26      15 mg   See details      27      15 mg         28      20 mg         29      15 mg           30      15 mg         31      20 mg               Date Details   07/26 This INR check               How to take your warfarin dose     To take:  15 mg Take 3 of the 5 mg tablets.    To take:  20 mg Take 4 of the 5 mg tablets.           August 2017 Details    Sun Mon Tue Wed Thu Fri Sat       1      15 mg         2      15 mg         3      15 mg         4      20 mg         5      15 mg           6      15 mg         7            8               9               10               11               12                 13               14               15               16               17               18               19                 20               21               22               23               24               25                26                 27               28               29               30               31                  Date Details   No additional details    Date of next INR:  8/7/2017         How to take your warfarin dose     To take:  15 mg Take 3 of the 5 mg tablets.    To take:  20 mg Take 4 of the 5 mg tablets.

## 2017-08-04 ENCOUNTER — PRE VISIT (OUTPATIENT)
Dept: CARDIOLOGY | Facility: CLINIC | Age: 42
End: 2017-08-04

## 2017-08-05 NOTE — PROGRESS NOTES
Atrial fibrillation    SOB (shortness of breath  Chronic diastolic heart failure    Dilated cardiomyopathy    Chronic atrial fibrillation    Cellulitis  Long-term  use of anticoagulants    Plantar fasciitis  Neuropathic pain of lower extremity  Peritonsillar abscess  Asthma  Azotemia  Hypothyroidism following radioiodine therapy  JYOTI (obstructive sleep apnea) CPAP Min Pressure of 13 and Max Pressure of 18  Chronic anticoagulation for a-fib  Morbid obesity    Diabetes mellitus, type 2    There is no interim history of increasing shortness of breath, orthopnea, PND, ankle edema, increasing abdominal girth, palpitation, pre-syncope, syncope, bleeding or thromboembolic complications.  The patient is taking medication regularly, following a low salt diet, and exercising regularly as outlined.    Alert, oriented, normal gait and station, normal mental, JVP within normal limits, HJR negative,thyroid not enlarged, mucous membranes clear, abdomen without tenderness, rebound or guarding, skin clear of lesion, PMI normal, S1 S2 regular rhythm, no gallop, no edema    Wt Readings from Last 24 Encounters:   08/07/17 (!) 210.9 kg (465 lb)   05/09/17 (!) 211.1 kg (465 lb 8 oz)   04/06/17 (!) 219.5 kg (484 lb)   03/23/17 (!) 209.1 kg (461 lb)   03/15/17 (!) 211.6 kg (466 lb 8 oz)   03/14/17 (!) 208.7 kg (460 lb)   02/13/17 (!) 209.5 kg (461 lb 12.8 oz)   02/07/17 (!) 211.9 kg (467 lb 1.6 oz)   02/06/17 (!) 211.4 kg (466 lb 0.8 oz)   02/03/17 (!) 211.4 kg (466 lb)   01/24/17 (!) 208.7 kg (460 lb 1.6 oz)   12/28/16 (!) 208.7 kg (460 lb)   12/28/16 (!) 209.4 kg (461 lb 9.6 oz)   11/14/16 (!) 206.7 kg (455 lb 11.2 oz)   11/10/16 (!) 207.1 kg (456 lb 8 oz)   10/13/16 (!) 206.9 kg (456 lb 3.2 oz)   09/06/16 (!) 206.2 kg (454 lb 9.6 oz)   08/30/16 (!) 209.6 kg (462 lb)   08/10/16 (!) 207.3 kg (457 lb)   07/11/16 (!) 212.5 kg (468 lb 8 oz)   06/16/16 (!) 208.7 kg (460 lb 3.2 oz)   06/13/16 (!) 209.8 kg (462 lb 8 oz)   05/10/16 (!) 208.8 kg  (460 lb 4.8 oz)   05/04/16 (!) 209.8 kg (462 lb 9.6 oz)     T (MRN [id number]) as of 8/7/2017 11:10   Ref. Range 5/16/2017 10:16 5/31/2017 15:36 5/31/2017 15:48 8/7/2017 10:00   Sodium Latest Ref Range: 133 - 144 mmol/L  136     Potassium Latest Ref Range: 3.4 - 5.3 mmol/L  3.4     Chloride Latest Ref Range: 94 - 109 mmol/L  99     Carbon Dioxide Latest Ref Range: 20 - 32 mmol/L  30     Urea Nitrogen Latest Ref Range: 7 - 30 mg/dL  17     Creatinine Latest Ref Range: 0.52 - 1.04 mg/dL  0.92     GFR Estimate Latest Ref Range: >60 mL/min/1.7m2  66     GFR Estimate If Black Latest Ref Range: >60 mL/min/1.7m2  80     Calcium Latest Ref Range: 8.5 - 10.1 mg/dL  8.7     Anion Gap Latest Ref Range: 3 - 14 mmol/L  8     CRP Inflammation Latest Ref Range: 0.0 - 8.0 mg/L  71.7 (H)     Procalcitonin Latest Units: ng/ml  <0.05...     Uric Acid Latest Ref Range: 2.6 - 6.0 mg/dL  9.4 (H)     Glucose Latest Ref Range: 70 - 99 mg/dL  200 (H)     WBC Latest Ref Range: 4.0 - 11.0 10e9/L  13.6 (H)     Hemoglobin Latest Ref Range: 11.7 - 15.7 g/dL  12.7     Hematocrit Latest Ref Range: 35.0 - 47.0 %  39.6     Platelet Count Latest Ref Range: 150 - 450 10e9/L  337     RBC Count Latest Ref Range: 3.8 - 5.2 10e12/L  4.35     MCV Latest Ref Range: 78 - 100 fl  91     MCH Latest Ref Range: 26.5 - 33.0 pg  29.2     MCHC Latest Ref Range: 31.5 - 36.5 g/dL  32.1     RDW Latest Ref Range: 10.0 - 15.0 %  15.4 (H)     INR Latest Ref Range: 0.86 - 1.14  2.83 (H) 2.44 (H)  2.19 (H)   XR FOOT LT G/E 3 VW Unknown   Rpt      Current Outpatient Prescriptions   Medication     warfarin (COUMADIN) 5 MG tablet     potassium chloride SA (POTASSIUM CHLORIDE) 20 MEQ CR tablet     hydrALAZINE (APRESOLINE) 25 MG tablet     topiramate (TOPAMAX) 25 MG tablet     insulin pen needle (BD RIVER U/F) 32G X 4 MM     insulin glargine U-300 (TOUJEO) 300 UNIT/ML injection     buPROPion (WELLBUTRIN SR) 100 MG 12 hr tablet     diclofenac (VOLTAREN) 1 % GEL topical gel      colchicine 0.6 MG tablet     clotrimazole (LOTRIMIN) 1 % cream     ciclopirox 8 % SOLN     Efinaconazole 10 % SOLN     gabapentin (NEURONTIN) 300 MG capsule     acetaminophen (TYLENOL) 325 MG tablet     blood glucose monitoring (ACCU-CHEK FASTCLIX) lancets     blood glucose monitoring (ACCU-CHEK SMARTVIEW) test strip     torsemide (DEMADEX) 100 MG tablet     NOVOLOG FLEXPEN 100 UNIT/ML soln     polyethylene glycol (MIRALAX/GLYCOLAX) powder     levothyroxine (SYNTHROID/LEVOTHROID) 200 MCG tablet     morphine (MS CONTIN) 15 MG 12 hr tablet     order for DME     liraglutide (VICTOZA PEN) 18 MG/3ML soln     metoprolol (TOPROL-XL) 50 MG 24 hr tablet     order for DME     capsaicin (ZOSTRIX) 0.025 % CREA     order for DME     nystatin (MYCOSTATIN) cream     spironolactone (ALDACTONE) 50 MG tablet     oxyCODONE-acetaminophen (PERCOCET) 5-325 MG per tablet     DULoxetine (CYMBALTA) 20 MG capsule     senna (SENOKOT) 8.6 MG tablet     bisacodyl (DULCOLAX) 10 MG suppository     Blood Glucose Monitoring Suppl (ACCU-CHEK RON PLUS) W/DEVICE KIT     tiotropium (SPIRIVA HANDIHALER) 18 MCG inhalation capsule     Respiratory Therapy Supplies (NEBULIZER COMPRESSOR) KIT     ORDER FOR DME     albuterol (PROAIR HFA, PROVENTIL HFA, VENTOLIN HFA) 108 (90 BASE) MCG/ACT inhaler     diazepam (VALIUM) 5 MG tablet     nicotine ([employer] NICOTINE) 14 MG/24HR patch 2h hr     No current facility-administered medications for this visit.    See Dr. Bernal and associates for follow-up and possible addition of allopurinol    Re-authorize cardiac rehabilitation    Authorize BMP today    Weight loss discussed    Blood pressure appears to be adequately controlled.

## 2017-08-07 ENCOUNTER — ALLIED HEALTH/NURSE VISIT (OUTPATIENT)
Dept: SURGERY | Facility: CLINIC | Age: 42
End: 2017-08-07

## 2017-08-07 ENCOUNTER — OFFICE VISIT (OUTPATIENT)
Dept: CARDIOLOGY | Facility: CLINIC | Age: 42
End: 2017-08-07
Attending: INTERNAL MEDICINE
Payer: MEDICARE

## 2017-08-07 ENCOUNTER — ANTICOAGULATION THERAPY VISIT (OUTPATIENT)
Dept: ANTICOAGULATION | Facility: CLINIC | Age: 42
End: 2017-08-07

## 2017-08-07 VITALS
OXYGEN SATURATION: 95 % | DIASTOLIC BLOOD PRESSURE: 81 MMHG | WEIGHT: 293 LBS | SYSTOLIC BLOOD PRESSURE: 133 MMHG | HEART RATE: 85 BPM | BODY MASS INDEX: 45.99 KG/M2 | HEIGHT: 67 IN

## 2017-08-07 DIAGNOSIS — I50.32 CHRONIC DIASTOLIC HEART FAILURE (H): ICD-10-CM

## 2017-08-07 DIAGNOSIS — Z79.01 LONG-TERM (CURRENT) USE OF ANTICOAGULANTS: ICD-10-CM

## 2017-08-07 DIAGNOSIS — Z79.01 CHRONIC ANTICOAGULATION: Primary | ICD-10-CM

## 2017-08-07 DIAGNOSIS — G57.90: Primary | ICD-10-CM

## 2017-08-07 DIAGNOSIS — I48.91 ATRIAL FIBRILLATION (H): ICD-10-CM

## 2017-08-07 LAB
ANION GAP SERPL CALCULATED.3IONS-SCNC: 10 MMOL/L (ref 3–14)
BUN SERPL-MCNC: 27 MG/DL (ref 7–30)
CALCIUM SERPL-MCNC: 9.1 MG/DL (ref 8.5–10.1)
CHLORIDE SERPL-SCNC: 101 MMOL/L (ref 94–109)
CO2 SERPL-SCNC: 25 MMOL/L (ref 20–32)
CREAT SERPL-MCNC: 1.16 MG/DL (ref 0.52–1.04)
GFR SERPL CREATININE-BSD FRML MDRD: 51 ML/MIN/1.7M2
GLUCOSE SERPL-MCNC: 209 MG/DL (ref 70–99)
INR PPP: 2.19 (ref 0.86–1.14)
POTASSIUM SERPL-SCNC: 3.6 MMOL/L (ref 3.4–5.3)
SODIUM SERPL-SCNC: 135 MMOL/L (ref 133–144)

## 2017-08-07 PROCEDURE — 36415 COLL VENOUS BLD VENIPUNCTURE: CPT | Performed by: INTERNAL MEDICINE

## 2017-08-07 PROCEDURE — 99214 OFFICE O/P EST MOD 30 MIN: CPT | Mod: ZP | Performed by: INTERNAL MEDICINE

## 2017-08-07 PROCEDURE — 85610 PROTHROMBIN TIME: CPT | Performed by: INTERNAL MEDICINE

## 2017-08-07 PROCEDURE — 99212 OFFICE O/P EST SF 10 MIN: CPT

## 2017-08-07 PROCEDURE — 80048 BASIC METABOLIC PNL TOTAL CA: CPT | Performed by: INTERNAL MEDICINE

## 2017-08-07 ASSESSMENT — PAIN SCALES - GENERAL: PAINLEVEL: NO PAIN (0)

## 2017-08-07 NOTE — PROGRESS NOTES
"  Bariatric Nutrition Consultation Note    Reason For Visit: Nutrition Reassessment    Pricilla Brown is a 42 year-old (type 2 DM on insulin sliding scale) presenting today for bariatric nutrition consult.  Pt is interested in laparoscopic sleeve gastrectomy.  Pt has met all required RD visits prior to surgery.  Pt was referred by NANETTE Kerr and Dr. Silva Damon.    Coordination Notes: Reviewed task list with Pt and printed her another one.  Encouraged her to call Sylvia Rosas.  (Pt unsure if Medicare minds if she does the psych eval earlier or if they need it closer to potential surgery date.)    ANTHROPOMETRICS:  Ht: 67\"  Initial Wt: 462.6 lbs with BMI of 72.6  Current Wt: 469.2 lbs (+6.6 lbs from initial weight)     Required weight loss goal pre-op: -46 lbs from initial consult weight (goal weight 416.6 lbs or less before surgery)    NUTRITION HISTORY:  Food Allergies/Intolerances: none known  Cravings: sweets, chips, pop (diet)  Triggers/cues causing extra eating: boredom (currently on disability, not working)  *Pt is on topiramate.     Progress with Previous Goals:  Relating To Eating:  -Track food intake prior to eating on MyFitnessPal.com with a target of no more than 1,800 Calories for 2 lbs/week weight loss, 2400 mg sodium or less/day would be great.- Not tracking currently, but plans on restarting this tomorrow.  She is trying to focus on lean protein and non-starchy vegetables.    *Have 1 protein meal replacement shake daily (Premier Protein).- Not met for the past month. She plans to restart this. Pt had previously tried 2 meal replacement shakes/day, but it was too difficult to maintain with her hunger; therefore, she plans to just restart with one shake per day and possibly work up to 2 meal replacement shakes/day.   *Eat as much non-starchy vegetables as you need to fill you up (no calorie-containing dips/condiments).- Meeting per Pt report.     (non-starchy veg: green leafy " vegetables, cucumbers, broccoli, cauliflower, green string beans)   *Have 1 fruit daily.- Meeting.    *Drink at least 64 oz water between meals daily- Meeting. She also has diet pop sometimes.     -Eat slowly (20-30 minutes per meal), chewing foods well (25 chews per bite/applesauce consistency)- Improving.     -Increase exercise as able.  Cardiac rehab 3 days weekly for 60 min.- Not met, but her cardiologist just restarted her on cardiac rehab, so she will start this back up again.  *Pt is down to 5 cigarettes/day.   Pt reported that her BGs have been better under control, not needing to treat any lows since insulin was adjusted last time.     NUTRITION DIAGNOSIS:  Obesity r/t long history of self-monitoring deficit and excessive energy intake aeb BMI >30.- continues    INTERVENTION:  Intervention Provided/Education Provided: Discussed possible reasoning for not losing weight at a faster pace.  Advised she continue to follow-up with medical weight management endocrinologist.  Advised she gradually ease into the modified liquid diet again.  (Pt is concerned she the modified liquid diet may backfire as it has done in the past.)  Reviewed previous goals.  Discussed portion control, volumetric-type eating for satiety on less calories.  Emphasized the importance of mindful eating.  Pt desired to keep goals the same for now.  Gave encouragement and support.  Provided Pt with list of goals and task list.      Patient Understanding: good  Expected Compliance: good      GOALS:  Relating To Eating:  -Track food intake prior to eating on MyFitnessPal.com with a target of no more than 1,800 Calories for 2 lbs/week weight loss, 2400 mg sodium or less/day would be great.   *Have 1 protein meal replacement shake daily (Premier Protein).   *Eat as much non-starchy vegetables as you need to fill you up (no calorie-containing dips/condiments).    (non-starchy veg: green leafy vegetables, cucumbers, broccoli, cauliflower, green  string beans)   *Have 1 fruit daily.   *Drink at least 64 oz water between meals daily    -Eat slowly (20-30 minutes per meal), chewing foods well (25 chews per bite/applesauce consistency)    -Increase exercise as able.  Cardiac rehab 3 days weekly for 60 min.    -Continue to work on quitting smoking.     Follow-Up: follow-up with RD in 1 month.     Time spent with patient: 30 minutes.  Paulette Ruiz, MS, RDN, LDN, CLT  Pager: [phone]

## 2017-08-07 NOTE — PROGRESS NOTES
ANTICOAGULATION FOLLOW-UP CLINIC VISIT    Patient Name:  Pricilla Brown  Date:  8/7/2017  Contact Type:  Telephone    SUBJECTIVE:        OBJECTIVE    INR   Date Value Ref Range Status   08/07/2017 2.19 (H) 0.86 - 1.14 Final     Chromogenic Factor 10   Date Value Ref Range Status   04/08/2017 19 (L) 70 - 130 % Final     Comment:     Therapeutic Range:  A Chromogenic Factor 10 level of approximately 20-40%   inversely correlates with an INR of 2-3 for patients receiving Warfarin.   Chromogenic Factor 10 levels below 20% indicate an INR greater than 3 and   levels above 40% indicate an INR less than 2.         ASSESSMENT / PLAN  INR assessment THER    Recheck INR In: 3 WEEKS    INR Location Clinic      Anticoagulation Summary as of 8/7/2017     INR goal 2.0-2.5   Today's INR 2.19   Maintenance plan 20 mg (5 mg x 4) on Mon, Fri; 15 mg (5 mg x 3) all other days   Full instructions 20 mg on Mon, Fri; 15 mg all other days   Weekly total 115 mg   No change documented Velvet Khan RN   Plan last modified Velvet Asencio RN (3/15/2017)   Next INR check 8/28/2017   Priority INR   Target end date Indefinite    Indications   Atrial fibrillation (H) [I48.91] [I48.91]  Long-term (current) use of anticoagulants [Z79.01] [Z79.01]         Anticoagulation Episode Summary     INR check location Clinic Lab    Preferred lab     Send INR reminders to [facility] CLINIC    Comments Contact Patient at [phone]      Anticoagulation Care Providers     Provider Role Specialty Phone number    Percy Alcala MD  Cardiology [phone]            See the Encounter Report to view Anticoagulation Flowsheet and Dosing Calendar (Go to Encounters tab in chart review, and find the Anticoagulation Therapy Visit)    Left message for patient with results and dosing recommendations. Asked patient to call back to report any missed doses, falls, signs and symptoms of bleeding or clotting, any changes in health, medication, or diet.  Asked patient to call back with any questions or concerns.     Velvet Khan RN

## 2017-08-07 NOTE — MR AVS SNAPSHOT
After Visit Summary   8/7/2017    Pricilla Brown    MRN: [id number]           Patient Information     Date Of Birth          1975        Visit Information        Provider Department      8/7/2017 10:30 AM Percy Alcala MD [facility]        Today's Diagnoses     Chronic anticoagulation for a-fib    -  1    Chronic diastolic heart failure (H)          Care Instructions    See Dr. Bernal and associates for follow-up and possible addition of allopurinol    Re-authorize cardiac rehabilitation    Authorize BMP today    Weight loss discussed    Blood pressure appears to be adequately controlled.      Return to clinic in 8 months  Questions: Call [phone]  Adeola Petit RN            Follow-ups after your visit        Additional Services     CARDIAC REHAB REFERRAL       Your provider has referred you to: [facility]: [facility] [phone]   [email or web address]                  Follow-up notes from your care team     Return in about 8 months (around 4/7/2018), or if symptoms worsen or fail to improve.      Your next 10 appointments already scheduled     Aug 07, 2017 11:30 AM CDT   NUTRITION VISIT with Paulette Ruiz RD   [facility] ([facility])    [address] [place] [address]  [address] [facility] [address] [phone]            Aug 18, 2017 12:00 PM CDT   (Arrive by 11:45 AM)   RETURN DIABETES with Isela Archibald PA-C   [facility] ([facility])    [address] [facility]  [address] [facility] [id number] [phone]            Sep 12, 2017 11:45 AM CDT   (Arrive by 11:30 AM)   Return Visit with Silva Damon MD   [facility] ([facility])    [address] [facility]  [address] [facility] [id number] [phone]            Dec 12,  2017  8:30 AM CST   (Arrive by 8:15 AM)   RETURN HEART FAILURE with Percy Alcala MD   [facility] ([facility] [place])    [address] [facility]  [address] [facility] [address] [phone]            Mar 20, 2018  1:00 PM CDT   Return Sleep Patient with YOCASTA Horan CNP   [facility] [place] Sleep [facility] ([facility])    [address] [place] [address] [phone]              Future tests that were ordered for you today     Open Future Orders        Priority Expected Expires Ordered    Basic metabolic panel Routine 8/7/2017 8/7/2018 8/7/2017            Who to contact     If you have questions or need follow up information about today's clinic visit or your schedule please contact [facility] directly at [phone].  Normal or non-critical lab and imaging results will be communicated to you by MyChart, letter or phone within 4 business days after the clinic has received the results. If you do not hear from us within 7 days, please contact the clinic through [employer]Jam[employer] or phone. If you have a critical or abnormal lab result, we will notify you by phone as soon as possible.  Submit refill requests through Sense.ly or call your pharmacy and they will forward the refill request to us. Please allow 3 business days for your refill to be completed.          Additional Information About Your Visit        [employer]Jamhart Information     Sense.ly gives you secure access to your electronic health record. If you see a primary care provider, you can also send messages to your care team and make appointments. If you have questions, please call your primary care clinic.  If you do not have a primary care provider, please call [phone] and they will assist you.        Care EveryWhere ID     This is your Care EveryWhere ID. This could be used by other organizations to access your [place] medical records  [id number]       "  Your Vitals Were     Pulse Height Pulse Oximetry BMI (Body Mass Index)          85 1.702 m (5' 7\") 95% 72.83 kg/m2         Blood Pressure from Last 3 Encounters:   08/07/17 133/81   05/31/17 128/79   04/08/17 146/54    Weight from Last 3 Encounters:   08/07/17 (!) 210.9 kg (465 lb)   05/09/17 (!) 211.1 kg (465 lb 8 oz)   04/06/17 (!) 219.5 kg (484 lb)              We Performed the Following     CARDIAC REHAB REFERRAL        Primary Care Provider Office Phone # Fax #    Jamilah Bernal [phone]       [address] [address]        Equal Access to Services     KIERSTEN DRIVER : Hadii miki oviedoo Sorosmery, waaxda luqadaha, qaybta kaalmada adeegyada, bola hurt . So [facility] [phone].    ATENCIÓN: Si habla español, tiene a de la rosa disposición servicios gratuitos de asistencia lingüística. Zara al [phone].    We comply with applicable federal civil rights laws and [place] laws. We do not discriminate on the basis of race, color, national origin, age, disability sex, sexual orientation or gender identity.            Thank you!     Thank you for choosing [facility]  for your care. Our goal is always to provide you with excellent care. Hearing back from our patients is one way we can continue to improve our services. Please take a few minutes to complete the written survey that you may receive in the mail after your visit with us. Thank you!             Your Updated Medication List - Protect others around you: Learn how to safely use, store and throw away your medicines at www.disposemymeds.org.          This list is accurate as of: 8/7/17 11:28 AM.  Always use your most recent med list.                   Brand Name Dispense Instructions for use Diagnosis    acetaminophen 325 MG tablet    TYLENOL    180 tablet    Take 2 tablets (650 mg) by mouth 3 times daily as needed for mild pain or fever (total acetaminophen dose " should not exceed 3000 mg per day)    Neuropathic pain of lower extremity, unspecified laterality       albuterol 108 (90 BASE) MCG/ACT Inhaler    PROAIR HFA/PROVENTIL HFA/VENTOLIN HFA    1 Inhaler    Inhale 2 puffs into the lungs every 6 hours as needed.    Reactive airway disease       bisacodyl 10 MG Suppository    DULCOLAX    6 suppository    Place 1 suppository (10 mg) rectally daily as needed for constipation    Constipation       blood glucose monitoring lancets     4 Box    Use to test blood sugar 4 times daily or as directed.    Type 2 diabetes mellitus with hyperglycemia, with long-term current use of insulin (H)       blood glucose monitoring meter device kit     1 kit    Use to test blood sugars 2 times daily or as directed.    Type II or unspecified type diabetes mellitus without mention of complication, not stated as uncontrolled       blood glucose monitoring test strip    ACCU-CHEK SMARTVIEW    450 each    Use to test blood sugar 5 times daily    Type 2 diabetes mellitus with hyperglycemia, with long-term current use of insulin (H)       buPROPion 100 MG 12 hr tablet    WELLBUTRIN SR    180 tablet    Take 1 tablet (100 mg) by mouth 2 times daily    Tobacco abuse       capsaicin 0.025 % Crea cream    ZOSTRIX    1 Tube    Apply 1 g topically 3 times daily as needed    Dermatophytosis of nail       ciclopirox 8 % Soln     1 Bottle    Externally apply topically daily To toenails.    Dermatophytosis of nail       clotrimazole 1 % cream    LOTRIMIN    30 g    Apply topically 2 times daily    Tinea pedis of right foot       colchicine 0.6 MG tablet     12 tablet    Take 1-2 tablets (0.6-1.2 mg) by mouth daily as needed for moderate pain    Acute gouty arthritis       diazepam 5 MG tablet    VALIUM    1 tablet    Hand carry to procedure on 2/21. Do not take until instructed.    Claustrophobia       diclofenac 1 % Gel topical gel    VOLTAREN    100 g    Apply 4 grams to knees or 2 grams to hands four times  daily using enclosed dosing card.    Left foot pain       DULoxetine 20 MG EC capsule    CYMBALTA    60 capsule    Take 1 capsule (20 mg) by mouth 2 times daily    Depression       Efinaconazole 10 % Soln     8 mL    Externally apply topically daily To toenails.    Dermatophytosis of nail       gabapentin 300 MG capsule    NEURONTIN    360 capsule    Take 2 capsules (600 mg) by mouth 2 times daily    Lumbago       hydrALAZINE 25 MG tablet    APRESOLINE    150 tablet    Take 1 tab (25 mg) in am, 2 tabs (50 mg) midday, and 2 tabs (50 mg) in pm daily    Dilated cardiomyopathy (H)       insulin glargine U-300 300 UNIT/ML injection    TOUJEO    9 mL    Inject 160 units SQ each am.    Diabetes mellitus, type 2 (H)       insulin pen needle 32G X 4 MM    BD RIVER U/F    550 each    Use 6 daily or as directed.    Diabetes mellitus, type 2 (H)       levothyroxine 200 MCG tablet    SYNTHROID/LEVOTHROID    90 tablet    Take 1 tablet (200 mcg) by mouth daily    Hypothyroidism       liraglutide 18 MG/3ML soln    VICTOZA PEN    27 mL    Inject 1.8 mg Subcutaneous daily    Diabetes mellitus type 1 (H)       metoprolol 50 MG 24 hr tablet    TOPROL-XL    90 tablet    Take 0.5 tablets (25 mg) by mouth At Bedtime    Chronic diastolic heart failure (H)       morphine 15 MG 12 hr tablet    MS CONTIN     Take 15 mg by mouth daily as needed        Nebulizer Compressor Kit     1 kit    1 Device 4 times daily as needed.    COPD (chronic obstructive pulmonary disease) (H)       nicotine 14 MG/24HR 24 hr patch    [employer] NICOTINE    30 patch    Place 1 patch onto the skin every 24 hours    Tobacco abuse       NovoLOG FLEXPEN 100 UNIT/ML injection   Generic drug:  insulin aspart     60 mL    Inject 60 units with meals plus correction. Pt uses approx 180 units in 24 hrs.        nystatin cream    MYCOSTATIN    90 g    Apply topically 2 times daily To toenails    Dermatophytosis of nail       order for DME     1 each    Use your CPAP device as directed  by your provider. Pressure change to min 13 max 18cwp        * order for DME     1 each    Equipment being ordered: Challenger Wide walker if available - patient needs seat, basket and brakes.    Dilated cardiomyopathy (H), Chronic systolic heart failure (H)       * order for DME     1 each    Equipment being ordered: BI [id number] $92  Plantar, fasciitis, LG, night splint    Dermatophytosis of nail, Plantar fasciitis of right foot, Diabetic neuropathy with neurologic complication (H), Peroneal tendinitis, right       * order for DME     1 Units    Left foot    Left foot pain, Diabetic neuropathy with neurologic complication (H)       oxyCODONE-acetaminophen 5-325 MG per tablet    PERCOCET    60 tablet    Take 1 tablet by mouth every 8 hours as needed for moderate to severe pain (try to limit use, no further prescriptions until seen in pain clinic)    Lumbago, Bilateral low back pain with sciatica, sciatica laterality unspecified       polyethylene glycol powder    MIRALAX/GLYCOLAX          potassium chloride SA 20 MEQ CR tablet    potassium chloride    240 tablet    Take 2 tablets (40 mEq) by mouth 4 times daily    Hypokalemia       senna 8.6 MG tablet    SENOKOT    45 tablet    Take 1 tablet by mouth 2 times daily    Constipation       spironolactone 50 MG tablet    ALDACTONE    30 tablet    Take 1 tablet (50 mg) by mouth daily        tiotropium 18 MCG capsule    SPIRIVA HANDIHALER    30 capsule    Inhale contents of one capsule daily.    COPD (chronic obstructive pulmonary disease) (H)       topiramate 25 MG tablet    TOPAMAX    180 tablet    Take 3 tablets (75 mg) by mouth 2 times daily    Morbid obesity, unspecified obesity type (H)       torsemide 100 MG tablet    DEMADEX    180 tablet    Take 1 tablet (100 mg) by mouth 2 times daily    Chronic diastolic heart failure (H)       warfarin 5 MG tablet    COUMADIN    100 tablet    Take 20 mg (four tabs) M and F and 15 mg (three tabs) all other days of the week  or as directed by the Coumadin Clinic.    Long term current use of anticoagulant therapy       * Notice:  This list has 3 medication(s) that are the same as other medications prescribed for you. Read the directions carefully, and ask your doctor or other care provider to review them with you.

## 2017-08-07 NOTE — MR AVS SNAPSHOT
Pricilla Brown   8/7/2017   Anticoagulation Therapy Visit    Description:  42 year old female   Provider:  Velvet Khan, RN   Department:  [facility]           INR as of 8/7/2017     Today's INR 2.19      Anticoagulation Summary as of 8/7/2017     INR goal 2.0-2.5   Today's INR 2.19   Full instructions 20 mg on Mon, Fri; 15 mg all other days   Next INR check 8/28/2017    Indications   Atrial fibrillation (H) [I48.91] [I48.91]  Long-term (current) use of anticoagulants [Z79.01] [Z79.01]         August 2017 Details    Sun Mon Tue Wed Thu Fri Sat       1               2               3               4               5                 6               7      20 mg   See details      8      15 mg         9      15 mg         10      15 mg         11      20 mg         12      15 mg           13      15 mg         14      20 mg         15      15 mg         16      15 mg         17      15 mg         18      20 mg         19      15 mg           20      15 mg         21      20 mg         22      15 mg         23      15 mg         24      15 mg         25      20 mg         26      15 mg           27      15 mg         28            29               30               31                  Date Details   08/07 This INR check       Date of next INR:  8/28/2017         How to take your warfarin dose     To take:  15 mg Take 3 of the 5 mg tablets.    To take:  20 mg Take 4 of the 5 mg tablets.

## 2017-08-07 NOTE — MR AVS SNAPSHOT
MRN:[id number]                      After Visit Summary   8/7/2017    Pricilla Brown    MRN: [id number]           Visit Information        Provider Department      8/7/2017 11:30 AM Paulette Ruiz RD [facility]        Your next 10 appointments already scheduled     Aug 18, 2017 12:00 PM CDT   (Arrive by 11:45 AM)   RETURN DIABETES with Isela Archibald PA-C   [facility] ([facility])    [address] [place] [id number]   [phone]            Sep 12, 2017 11:45 AM CDT   (Arrive by 11:30 AM)   Return Visit with Silva Damon MD   [facility] ([facility])    [address] [place] [address]   [phone]            Dec 12, 2017  8:30 AM CST   (Arrive by 8:15 AM)   RETURN HEART FAILURE with Percy Alcala MD   [facility] ([facility])    [address] [place] [address]   [phone]            Mar 20, 2018  1:00 PM CDT   Return Sleep Patient with YOCASTA Horan [facility], Sleep Study ([facility])    [address] [place] [id number] [phone]              Care Instructions      GOALS:  Relating To Eating:  -Track food intake prior to eating on MyFitnessPal.com with a target of no more than 1,800 Calories for 2 lbs/week weight loss, 2400 mg sodium or less/day would be great.   *Have 1 protein meal replacement shake daily (Premier Protein).   *Eat as much non-starchy vegetables as you need to fill you up (no calorie-containing dips/condiments).    (non-starchy veg: green leafy vegetables, cucumbers, broccoli, cauliflower, green string beans)   *Have 1 fruit daily.   *Drink at least 64 oz water between meals daily    -Eat slowly (20-30 minutes per meal), chewing foods  well (25 chews per bite/applesauce consistency)    -Increase exercise as able.  Cardiac rehab 3 days weekly for 60 min.    -Continue to work on quitting smoking.          [employer] Information     [employer] gives you secure access to your electronic health record. If you see a primary care provider, you can also send messages to your care team and make appointments. If you have questions, please call your primary care clinic.  If you do not have a primary care provider, please call [phone] and they will assist you.      Cur[employer]ird is an electronic gateway that provides easy, online access to your medical records. With [employer], you can request a clinic appointment, read your test results, renew a prescription or communicate with your care team.     To access your existing account, please contact your [facility] or call [phone] for assistance.        Care EveryWhere ID     This is your Care EveryWhere ID. This could be used by other organizations to access your [place] medical records  [id number]        Equal Access to Services     KIERSTEN DRIVER : Maryam oviedoo Sorosmery, waaxda beth, qaybta kaalmamk shah, bola walker. So [facility] [phone].    ATENCIÓN: Si habla español, tiene a de la rosa disposición servicios gratuitos de asistencia lingüística. Llame al [phone].    We comply with applicable federal civil rights laws and [place] laws. We do not discriminate on the basis of race, color, national origin, age, disability sex, sexual orientation or gender identity.

## 2017-08-07 NOTE — PATIENT INSTRUCTIONS
See Dr. Bernal and associates for follow-up and possible addition of allopurinol    Re-authorize cardiac rehabilitation    Authorize BMP today    Weight loss discussed    Blood pressure appears to be adequately controlled.      Return to clinic in 8 months  Questions: Call [phone]  Adeola Petit RN

## 2017-08-07 NOTE — PATIENT INSTRUCTIONS
GOALS:  Relating To Eating:  -Track food intake prior to eating on [email or web address].com with a target of no more than 1,800 Calories for 2 lbs/week weight loss, 2400 mg sodium or less/day would be great.   *Have 1 protein meal replacement shake daily (Premier Protein).   *Eat as much non-starchy vegetables as you need to fill you up (no calorie-containing dips/condiments).    (non-starchy veg: green leafy vegetables, cucumbers, broccoli, cauliflower, green string beans)   *Have 1 fruit daily.   *Drink at least 64 oz water between meals daily    -Eat slowly (20-30 minutes per meal), chewing foods well (25 chews per bite/applesauce consistency)    -Increase exercise as able.  Cardiac rehab 3 days weekly for 60 min.    -Continue to work on quitting smoking.

## 2017-08-07 NOTE — LETTER
8/7/2017      RE: Pricilla Brown  [address] [place] [id number]       Dear Colleague,    Thank you for the opportunity to participate in the care of your patient, Pricilla Brown, at the [facility] at [facility]. Please see a copy of my visit note below.    Atrial fibrillation    SOB (shortness of breath  Chronic diastolic heart failure    Dilated cardiomyopathy    Chronic atrial fibrillation    Cellulitis  Long-term  use of anticoagulants    Plantar fasciitis  Neuropathic pain of lower extremity  Peritonsillar abscess  Asthma  Azotemia  Hypothyroidism following radioiodine therapy  JYOTI (obstructive sleep apnea) CPAP Min Pressure of 13 and Max Pressure of 18  Chronic anticoagulation for a-fib  Morbid obesity    Diabetes mellitus, type 2    There is no interim history of increasing shortness of breath, orthopnea, PND, ankle edema, increasing abdominal girth, palpitation, pre-syncope, syncope, bleeding or thromboembolic complications.  The patient is taking medication regularly, following a low salt diet, and exercising regularly as outlined.    Alert, oriented, normal gait and station, normal mental, JVP within normal limits, HJR negative,thyroid not enlarged, mucous membranes clear, abdomen without tenderness, rebound or guarding, skin clear of lesion, PMI normal, S1 S2 regular rhythm, no gallop, no edema    Wt Readings from Last 24 Encounters:   08/07/17 (!) 210.9 kg (465 lb)   05/09/17 (!) 211.1 kg (465 lb 8 oz)   04/06/17 (!) 219.5 kg (484 lb)   03/23/17 (!) 209.1 kg (461 lb)   03/15/17 (!) 211.6 kg (466 lb 8 oz)   03/14/17 (!) 208.7 kg (460 lb)   02/13/17 (!) 209.5 kg (461 lb 12.8 oz)   02/07/17 (!) 211.9 kg (467 lb 1.6 oz)   02/06/17 (!) 211.4 kg (466 lb 0.8 oz)   02/03/17 (!) 211.4 kg (466 lb)   01/24/17 (!) 208.7 kg (460 lb 1.6 oz)   12/28/16 (!) 208.7 kg (460 lb)   12/28/16 (!) 209.4 kg (461 lb 9.6 oz)   11/14/16 (!) 206.7 kg (455 lb 11.2 oz)    11/10/16 (!) 207.1 kg (456 lb 8 oz)   10/13/16 (!) 206.9 kg (456 lb 3.2 oz)   09/06/16 (!) 206.2 kg (454 lb 9.6 oz)   08/30/16 (!) 209.6 kg (462 lb)   08/10/16 (!) 207.3 kg (457 lb)   07/11/16 (!) 212.5 kg (468 lb 8 oz)   06/16/16 (!) 208.7 kg (460 lb 3.2 oz)   06/13/16 (!) 209.8 kg (462 lb 8 oz)   05/10/16 (!) 208.8 kg (460 lb 4.8 oz)   05/04/16 (!) 209.8 kg (462 lb 9.6 oz)     T (MRN [id number]) as of 8/7/2017 11:10   Ref. Range 5/16/2017 10:16 5/31/2017 15:36 5/31/2017 15:48 8/7/2017 10:00   Sodium Latest Ref Range: 133 - 144 mmol/L  136     Potassium Latest Ref Range: 3.4 - 5.3 mmol/L  3.4     Chloride Latest Ref Range: 94 - 109 mmol/L  99     Carbon Dioxide Latest Ref Range: 20 - 32 mmol/L  30     Urea Nitrogen Latest Ref Range: 7 - 30 mg/dL  17     Creatinine Latest Ref Range: 0.52 - 1.04 mg/dL  0.92     GFR Estimate Latest Ref Range: >60 mL/min/1.7m2  66     GFR Estimate If Black Latest Ref Range: >60 mL/min/1.7m2  80     Calcium Latest Ref Range: 8.5 - 10.1 mg/dL  8.7     Anion Gap Latest Ref Range: 3 - 14 mmol/L  8     CRP Inflammation Latest Ref Range: 0.0 - 8.0 mg/L  71.7 (H)     Procalcitonin Latest Units: ng/ml  <0.05...     Uric Acid Latest Ref Range: 2.6 - 6.0 mg/dL  9.4 (H)     Glucose Latest Ref Range: 70 - 99 mg/dL  200 (H)     WBC Latest Ref Range: 4.0 - 11.0 10e9/L  13.6 (H)     Hemoglobin Latest Ref Range: 11.7 - 15.7 g/dL  12.7     Hematocrit Latest Ref Range: 35.0 - 47.0 %  39.6     Platelet Count Latest Ref Range: 150 - 450 10e9/L  337     RBC Count Latest Ref Range: 3.8 - 5.2 10e12/L  4.35     MCV Latest Ref Range: 78 - 100 fl  91     MCH Latest Ref Range: 26.5 - 33.0 pg  29.2     MCHC Latest Ref Range: 31.5 - 36.5 g/dL  32.1     RDW Latest Ref Range: 10.0 - 15.0 %  15.4 (H)     INR Latest Ref Range: 0.86 - 1.14  2.83 (H) 2.44 (H)  2.19 (H)   XR FOOT LT G/E 3 VW Unknown   Rpt      Current Outpatient Prescriptions   Medication     warfarin (COUMADIN) 5 MG tablet     potassium chloride SA  (POTASSIUM CHLORIDE) 20 MEQ CR tablet     hydrALAZINE (APRESOLINE) 25 MG tablet     topiramate (TOPAMAX) 25 MG tablet     insulin pen needle (BD RIVER U/F) 32G X 4 MM     insulin glargine U-300 (TOUJEO) 300 UNIT/ML injection     buPROPion (WELLBUTRIN SR) 100 MG 12 hr tablet     diclofenac (VOLTAREN) 1 % GEL topical gel     colchicine 0.6 MG tablet     clotrimazole (LOTRIMIN) 1 % cream     ciclopirox 8 % SOLN     Efinaconazole 10 % SOLN     gabapentin (NEURONTIN) 300 MG capsule     acetaminophen (TYLENOL) 325 MG tablet     blood glucose monitoring (ACCU-CHEK FASTCLIX) lancets     blood glucose monitoring (ACCU-CHEK SMARTVIEW) test strip     torsemide (DEMADEX) 100 MG tablet     NOVOLOG FLEXPEN 100 UNIT/ML soln     polyethylene glycol (MIRALAX/GLYCOLAX) powder     levothyroxine (SYNTHROID/LEVOTHROID) 200 MCG tablet     morphine (MS CONTIN) 15 MG 12 hr tablet     order for DME     liraglutide (VICTOZA PEN) 18 MG/3ML soln     metoprolol (TOPROL-XL) 50 MG 24 hr tablet     order for DME     capsaicin (ZOSTRIX) 0.025 % CREA     order for DME     nystatin (MYCOSTATIN) cream     spironolactone (ALDACTONE) 50 MG tablet     oxyCODONE-acetaminophen (PERCOCET) 5-325 MG per tablet     DULoxetine (CYMBALTA) 20 MG capsule     senna (SENOKOT) 8.6 MG tablet     bisacodyl (DULCOLAX) 10 MG suppository     Blood Glucose Monitoring Suppl (ACCU-CHEK RON PLUS) W/DEVICE KIT     tiotropium (SPIRIVA HANDIHALER) 18 MCG inhalation capsule     Respiratory Therapy Supplies (NEBULIZER COMPRESSOR) KIT     ORDER FOR DME     albuterol (PROAIR HFA, PROVENTIL HFA, VENTOLIN HFA) 108 (90 BASE) MCG/ACT inhaler     diazepam (VALIUM) 5 MG tablet     nicotine ([employer] NICOTINE) 14 MG/24HR patch 2h hr     No current facility-administered medications for this visit.    See Dr. Bernal and associates for follow-up and possible addition of allopurinol    Re-authorize cardiac rehabilitation    Authorize BMP today    Weight loss discussed    Blood pressure appears to  be adequately controlled.         Percy Alcala MD

## 2017-08-07 NOTE — NURSING NOTE
Chief Complaint   Patient presents with     Follow Up For     hf f/u, missed last few appts     Vitals were taken and medications were reconciled.     Manas Urias, HEATHER  10:33 AM

## 2017-08-17 DIAGNOSIS — I42.0 DILATED CARDIOMYOPATHY (H): ICD-10-CM

## 2017-08-18 RX ORDER — HYDRALAZINE HYDROCHLORIDE 25 MG/1
TABLET, FILM COATED ORAL
Qty: 450 TABLET | Refills: 3 | Status: SHIPPED | OUTPATIENT
Start: 2017-08-18 | End: 2018-04-19

## 2017-08-28 DIAGNOSIS — M10.9 ACUTE GOUTY ARTHRITIS: ICD-10-CM

## 2017-08-28 RX ORDER — COLCHICINE 0.6 MG/1
.6-1.2 TABLET ORAL DAILY PRN
Qty: 12 TABLET | Refills: 1 | Status: SHIPPED | OUTPATIENT
Start: 2017-08-28 | End: 2018-08-06

## 2017-09-07 ENCOUNTER — HOSPITAL ENCOUNTER (OUTPATIENT)
Dept: CARDIAC REHAB | Facility: CLINIC | Age: 42
End: 2017-09-07
Attending: INTERNAL MEDICINE
Payer: MEDICARE

## 2017-09-07 VITALS — WEIGHT: 293 LBS | HEIGHT: 67 IN | BODY MASS INDEX: 45.99 KG/M2

## 2017-09-07 LAB — GLUCOSE BLDC GLUCOMTR-MCNC: 340 MG/DL (ref 70–99)

## 2017-09-07 PROCEDURE — 93797 PHYS/QHP OP CAR RHAB WO ECG: CPT | Mod: 59

## 2017-09-07 PROCEDURE — 40000116 ZZH STATISTIC OP CR VISIT

## 2017-09-07 PROCEDURE — 93798 PHYS/QHP OP CAR RHAB W/ECG: CPT

## 2017-09-07 PROCEDURE — 00000146 ZZHCL STATISTIC GLUCOSE BY METER IP

## 2017-09-07 PROCEDURE — 40000575 ZZH STATISTIC OP CARDIAC VISIT #2

## 2017-09-07 ASSESSMENT — 6 MINUTE WALK TEST (6MWT)
GENDER SELECTION: FEMALE
MALE CALC: 1273.3
FEMALE CALC: 949.08
PREDICTED: 1281.07

## 2017-09-08 ENCOUNTER — OFFICE VISIT (OUTPATIENT)
Dept: PODIATRY | Facility: CLINIC | Age: 42
End: 2017-09-08
Payer: MEDICARE

## 2017-09-08 VITALS — DIASTOLIC BLOOD PRESSURE: 77 MMHG | HEART RATE: 83 BPM | SYSTOLIC BLOOD PRESSURE: 131 MMHG | OXYGEN SATURATION: 98 %

## 2017-09-08 DIAGNOSIS — E11.40 DIABETIC NEUROPATHY WITH NEUROLOGIC COMPLICATION (H): ICD-10-CM

## 2017-09-08 DIAGNOSIS — B35.1 DERMATOPHYTOSIS OF NAIL: ICD-10-CM

## 2017-09-08 DIAGNOSIS — M76.71 PERONEAL TENDINITIS, RIGHT: ICD-10-CM

## 2017-09-08 DIAGNOSIS — M76.61 ACHILLES TENDINITIS OF RIGHT LOWER EXTREMITY: ICD-10-CM

## 2017-09-08 DIAGNOSIS — E11.49 DIABETIC NEUROPATHY WITH NEUROLOGIC COMPLICATION (H): ICD-10-CM

## 2017-09-08 DIAGNOSIS — M79.671 RIGHT FOOT PAIN: Primary | ICD-10-CM

## 2017-09-08 DIAGNOSIS — M19.071 PRIMARY LOCALIZED OSTEOARTHROSIS, ANKLE AND FOOT, RIGHT: ICD-10-CM

## 2017-09-08 PROCEDURE — 99213 OFFICE O/P EST LOW 20 MIN: CPT | Performed by: PODIATRIST

## 2017-09-08 ASSESSMENT — PAIN SCALES - GENERAL: PAINLEVEL: EXTREME PAIN (9)

## 2017-09-08 NOTE — PATIENT INSTRUCTIONS
Thanks for coming today.  [facility]  [address] [place] [address]    To schedule future appointments in Ortho Clinic, you may call [phone].    To schedule ordered imaging by your provider:   Call [facility] [phone]    To schedule an injection ordered by your provider:  Call [facility] Injection scheduling line: [phone]  [email or web address] available online at:  [email or web address]    Please call if any further questions or concerns ([phone]).  Clinic hours 8 am to 5 pm.    Return to clinic (call) if symptoms worsen or fail to improve.

## 2017-09-08 NOTE — PROGRESS NOTES
Past Medical History:   Diagnosis Date     A-fib (H)     on coumadin since      Asthma     as a kid     Chest pain 2017     Chronic anticoagulation for a-fib 2/15/2013    INR's followed by coumadin clinic at      Diabetes mellitus (H)      Diastolic heart failure 2/15/2013     Dilated cardiomyopathy (H) 2013     HTN (hypertension)      Hyperthyroidism     Graves, s/p I131 , now on prednisone and methimazole     Morbid obesity (H)      Pulmonary embolism (H)     hospitalized in [place], on lovenox/coumadin for a few months but stopped, hypercoag w/u neg per pt     Sleep apnea     using CPAP     Patient Active Problem List   Diagnosis     Chronic atrial fibrillation (HCC)     Dilated cardiomyopathy (H)     Morbid obesity (H)     Diabetes mellitus, type 2 (H)     Chronic diastolic heart failure (H)     Chronic anticoagulation for a-fib     JYOTI (obstructive sleep apnea) CPAP Min Pressure of 13 and Max Pressure of 18     Hypothyroidism following radioiodine therapy     SOB (shortness of breath)     Azotemia     Asthma     Peritonsillar abscess     Plantar fasciitis     Neuropathic pain of lower extremity     Atrial fibrillation (H) [I48.91]     Long-term (current) use of anticoagulants [Z79.01]     Cellulitis     Chest pain     Foot pain     No past surgical history on file.  Social History     Social History     Marital status: Single     Spouse name: N/A     Number of children: N/A     Years of education: N/A     Occupational History     Not on file.     Social History Main Topics     Smoking status: Light Tobacco Smoker     Packs/day: 0.25     Years: 13.00     Types: Cigarettes     Smokeless tobacco: Never Used      Comment: down to 5 cigs     Alcohol use No     Drug use: No     Sexual activity: Yes     Partners: Male     Birth control/ protection: Condom     Other Topics Concern     Not on file     Social History Narrative    Single, no children        Gyn:        Last pap several years  ago, no abnormal    No STIs            Patient is single.  She is no longer working.  She used to work in the area of customer service.  She is currently living with her sister in [place], [place].  She has no pets.  Patient has smoked a half pack of cigarettes a day for the past 10 plus years.  She states that she is down to 5 cigarettes a day with the aid of Wellbutrin.  She does not smoke cigars, pipes or chew tobacco.  She has 1 cup of coffee in the morning.  She does not drink alcohol.  Patient does not exercise.      Family History   Problem Relation Age of Onset     Thyroid Disease Mother      Grave's D     DIABETES Mother      HEART DISEASE Mother      CEREBROVASCULAR DISEASE Mother      dec. 54 yo     Hypertension Mother      Thyroid Disease Maternal Aunt      HEART DISEASE Sister      Heart Murmur     DIABETES Sister      CANCER No family hx of      Glaucoma No family hx of      Macular Degeneration No family hx of      Thyroid Disease Maternal Uncle      HEART DISEASE Father      dec in his 30s, [place]     Psychotic Disorder Brother      Bipolar     DIABETES Brother      Thyroid Disease Brother      Hyper Thyroid     HEART DISEASE Brother      Thyroid Disease Sister      Hyper Thyroid     Thyroid Disease Sister      Hyper Thyroid     Thyroid Disease Sister      Mental Health Problems     Lab Results   Component Value Date    A1C 9.0 04/07/2017    Last Basic Metabolic Panel:  Lab Results   Component Value Date     08/07/2017      Lab Results   Component Value Date    POTASSIUM 3.6 08/07/2017     Lab Results   Component Value Date    CHLORIDE 101 08/07/2017     Lab Results   Component Value Date    JUSTIN 9.1 08/07/2017     Lab Results   Component Value Date    CO2 25 08/07/2017     Lab Results   Component Value Date    BUN 27 08/07/2017     Lab Results   Component Value Date    CR 1.16 08/07/2017     Lab Results   Component Value Date     08/07/2017     Inr          2.19         8/07/2017  SUBJECTIVE FINDINGS:  A 42-year-old female who returns to clinic for diabetic foot check, onychomycosis and right foot and ankle pain.  She relates that she had a cane and that did help some, but it broke so she needs a new one of those.  She relates she is wearing her diabetic shoes.  She is wearing her ankle brace.  The ankle brace does help some, but her right ankle at times swells and hurts.  Relates no specific injury.  No ulcers or sores since we have seen her last.  She relates she is diabetic with peripheral neuropathy.  She has had x-rays taken.      OBJECTIVE FINDINGS:  DP and PT are 2/4 bilaterally.  She has dystrophic, thickened, brittle nails with subungual debris, dystrophy and discoloration 1-5 bilaterally to differing degrees.  She has no erythema, no drainage, no odor, no calor bilaterally.  There is some edema of the right slightly more than left foot and ankle.  She has pain on palpation in the Achilles tendon, peroneal tendon on the right.  She relates it hurts in the foot and in the ankle and on the medial ankle as well at times.  There are no gross tendon voids bilaterally.  Sharp/dull is decreased with 5.07 [place] monofilament bilaterally.      RADIOGRAPHIC DATA:  X-rays reviewed.  Joint and cortical margins are intact.  She does have subchondral sclerosis and joint space narrowing with spurring present.  This is most prevalent on the talonavicular and naviculocuneiform joints.      ASSESSMENT AND PLAN:  Right foot pain, peroneal tendinopathy, right, Achilles tendinopathy, right.  She has osteoarthritis present.  She is diabetic with peripheral neuropathy, onychomycosis.  Diagnosis and treatment options discussed with the patient.  All the toenails were debrided bilaterally upon consent.  Continue the ankle brace.  Prescription for physical therapy given and use discussed with her.  Prescription for a cane given and use discussed with her.  We will see her back in 2-3  months.

## 2017-09-08 NOTE — NURSING NOTE
"Pricilla Brown's goals for this visit include: Diabetic foot care and recheck right ankle pain  She requests these members of her care team be copied on today's visit information: yes    PCP: Jamilah Bernal    Referring Provider:  ESTABLISHED PATIENT  No address on file    Chief Complaint   Patient presents with     RECHECK     Diabetic foot care       Initial /77  Pulse 83  SpO2 98% Estimated body mass index is 73.46 kg/(m^2) as calculated from the following:    Height as of 9/7/17: 1.702 m (5' 7\").    Weight as of 9/7/17: 212.7 kg (469 lb).  Medication Reconciliation: complete    "

## 2017-09-08 NOTE — MR AVS SNAPSHOT
After Visit Summary   2017    Pricilla Brown    MRN: [id number]           Patient Information     Date Of Birth          1975        Visit Information        Provider Department      2017 9:30 AM Norman Jean DPM [facility]        Today's Diagnoses     Right foot pain    -  1    Peroneal tendinitis, right        Achilles tendinitis of right lower extremity        Primary localized osteoarthrosis, ankle and foot, right        Diabetic neuropathy with neurologic complication (H)        Dermatophytosis of nail          Care Instructions    Thanks for coming today.  [facility] Clinic  [address] [place] [address]    To schedule future appointments in Ortho Clinic, you may call [phone].    To schedule ordered imaging by your provider:   Call [facility] [phone]    To schedule an injection ordered by your provider:  Call [facility] Injection scheduling line: [phone]  Rental Kharmahar[employer]ize available online at:  [email or web address]    Please call if any further questions or concerns ([phone]).  Clinic hours 8 am to 5 pm.    Return to clinic (call) if symptoms worsen or fail to improve.            Follow-ups after your visit        Additional Services     PHYSICAL THERAPY REFERRAL (Internal)       Physical Therapy Referral                  Your next 10 appointments already scheduled     Sep 12, 2017 11:45 AM CDT   (Arrive by 11:30 AM)   Return Visit with Silva Damon MD   [facility] ([facility])    [address] [place] [address]   [phone]            Sep 12, 2017  1:00 PM CDT   NUTRITION VISIT with Paulette Ruiz RD   [facility] ([facility])    [address] [place] [address]   [phone]            Sep 12, 2017  2:20 PM CDT   (Arrive by 2:05  PM)   PAP SMEAR with Juanita Ramirez MD   [facility] ([facility])    [address]  4th Floor  [facility] [address]   [phone]            Sep 13, 2017  3:00 PM CDT   Cardiac Treatment with Ur Cardiac Rehab 1   [facility] [place], Cardiac Rehabilitation ([facility])    [address]  [facility] [address]   [phone]            Sep 18, 2017  1:00 PM CDT   Cardiac Treatment with Ur Cardiac Rehab 1   [facility], Card[facility] ([facility])    [address]  [facility] [address]   [phone]            Sep 20, 2017  1:00 PM CDT   Cardiac Treatment with Ur Cardiac Rehab 1   [facility] ([facility])    [address]  [facility] [address]   [phone]            Sep 22, 2017  1:00 PM CDT   Cardiac Treatment with Ur Cardiac Rehab 1   [facility] ([facility])    [address]  [facility] [address]   [phone]            Sep 25, 2017  1:00 PM CDT   Cardiac Treatment with Ur Cardiac Rehab 1   [facility] ([facility])    [address]  [facility] [address]   [phone]            Sep 27, 2017  1:00 PM CDT   Cardiac Treatment with Ur Cardiac Rehab 1   [facility] [place] ([facility])    [address]  [facility] [address]   [phone]            Sep 29, 2017  1:00 PM CDT   Cardiac Treatment with Ur Cardiac Rehab 1   [facility]  [place], Cardiac Rehabilitation ([facility])    [address]  [facility] [address] [phone]              Who to contact     If you have questions or need follow up information about today's clinic visit or your schedule please contact [facility] directly at [phone].  Normal or non-critical lab and imaging results will be communicated to you by Drill Cyclehart, letter or phone within 4 business days after the clinic has received the results. If you do not hear from us within 7 days, please contact the clinic through Drill Cyclehart or phone. If you have a critical or abnormal lab result, we will notify you by phone as soon as possible.  Submit refill requests through Stellarray or call your pharmacy and they will forward the refill request to us. Please allow 3 business days for your refill to be completed.          Additional Information About Your Visit        Drill CycleharAirCast Mobile Information     Stellarray gives you secure access to your electronic health record. If you see a primary care provider, you can also send messages to your care team and make appointments. If you have questions, please call your primary care clinic.  If you do not have a primary care provider, please call [phone] and they will assist you.      Stellarray is an electronic gateway that provides easy, online access to your medical records. With Stellarray, you can request a clinic appointment, read your test results, renew a prescription or communicate with your care team.     To access your existing account, please contact your [facility] or call [phone] for assistance.        Care EveryWhere ID     This is your Care EveryWhere ID. This could be used by other organizations to access your [facility] medical records  [id number]        Your Vitals Were     Pulse Pulse Oximetry                83 98%           Blood Pressure  from Last 3 Encounters:   09/08/17 131/77   08/07/17 133/81   05/31/17 128/79    Weight from Last 3 Encounters:   09/07/17 (!) 212.7 kg (469 lb)   08/07/17 (!) 210.9 kg (465 lb)   05/09/17 (!) 211.1 kg (465 lb 8 oz)              We Performed the Following     CANES OF ANY MATERIAL     DEBRIDEMENT OF NAILS, 6 OR MORE     PHYSICAL THERAPY REFERRAL (Internal)        Primary Care Provider Office Phone # Fax #    Jamilah Bernal [phone]       [address] [place] [phone]  [facility] [address]        Equal Access to Services     KIERSTEN DRIVER : Maryam Woodward, walawanda campos, qafabiola kaalmada alyssa, bola walker. So [facility] [phone].    ATENCIÓN: Si habla español, tiene a de la rosa disposición servicios gratuitos de asistencia lingüística. Llame al [phone].    We comply with applicable federal civil rights laws and [place] laws. We do not discriminate on the basis of race, color, national origin, age, disability sex, sexual orientation or gender identity.            Thank you!     Thank you for choosing [facility]  for your care. Our goal is always to provide you with excellent care. Hearing back from our patients is one way we can continue to improve our services. Please take a few minutes to complete the written survey that you may receive in the mail after your visit with us. Thank you!             Your Updated Medication List - Protect others around you: Learn how to safely use, store and throw away your medicines at www.disposemymeds.org.          This list is accurate as of: 9/8/17 10:17 AM.  Always use your most recent med list.                   Brand Name Dispense Instructions for use Diagnosis    acetaminophen 325 MG tablet    TYLENOL    180 tablet    Take 2 tablets (650 mg) by mouth 3 times daily as needed for mild pain or fever (total acetaminophen dose should not exceed 3000 mg per day)    Neuropathic pain of lower  extremity, unspecified laterality       albuterol 108 (90 BASE) MCG/ACT Inhaler    PROAIR HFA/PROVENTIL HFA/VENTOLIN HFA    1 Inhaler    Inhale 2 puffs into the lungs every 6 hours as needed.    Reactive airway disease       bisacodyl 10 MG Suppository    DULCOLAX    6 suppository    Place 1 suppository (10 mg) rectally daily as needed for constipation    Constipation       blood glucose monitoring lancets     4 Box    Use to test blood sugar 4 times daily or as directed.    Type 2 diabetes mellitus with hyperglycemia, with long-term current use of insulin (H)       blood glucose monitoring meter device kit     1 kit    Use to test blood sugars 2 times daily or as directed.    Type II or unspecified type diabetes mellitus without mention of complication, not stated as uncontrolled       blood glucose monitoring test strip    ACCU-CHEK SMARTVIEW    450 each    Use to test blood sugar 5 times daily    Type 2 diabetes mellitus with hyperglycemia, with long-term current use of insulin (H)       buPROPion 100 MG 12 hr tablet    WELLBUTRIN SR    180 tablet    Take 1 tablet (100 mg) by mouth 2 times daily    Tobacco abuse       capsaicin 0.025 % Crea cream    ZOSTRIX    1 Tube    Apply 1 g topically 3 times daily as needed    Dermatophytosis of nail       ciclopirox 8 % Soln     1 Bottle    Externally apply topically daily To toenails.    Dermatophytosis of nail       clotrimazole 1 % cream    LOTRIMIN    30 g    Apply topically 2 times daily    Tinea pedis of right foot       colchicine 0.6 MG tablet     12 tablet    Take 1-2 tablets (0.6-1.2 mg) by mouth daily as needed for moderate pain    Acute gouty arthritis       diazepam 5 MG tablet    VALIUM    1 tablet    Hand carry to procedure on 2/21. Do not take until instructed.    Claustrophobia       diclofenac 1 % Gel topical gel    VOLTAREN    100 g    Apply 4 grams to knees or 2 grams to hands four times daily using enclosed dosing card.    Left foot pain        DULoxetine 20 MG EC capsule    CYMBALTA    60 capsule    Take 1 capsule (20 mg) by mouth 2 times daily    Depression       Efinaconazole 10 % Soln     8 mL    Externally apply topically daily To toenails.    Dermatophytosis of nail       gabapentin 300 MG capsule    NEURONTIN    360 capsule    Take 2 capsules (600 mg) by mouth 2 times daily    Lumbago       hydrALAZINE 25 MG tablet    APRESOLINE    450 tablet    Take 1 tab (25 mg) in am, 2 tabs (50 mg) midday, and 2 tabs (50 mg) in pm daily    Dilated cardiomyopathy (H)       insulin glargine U-300 300 UNIT/ML injection    TOUJEO    9 mL    Inject 160 units SQ each am.    Diabetes mellitus, type 2 (H)       insulin pen needle 32G X 4 MM    BD RIVER U/F    550 each    Use 6 daily or as directed.    Diabetes mellitus, type 2 (H)       levothyroxine 200 MCG tablet    SYNTHROID/LEVOTHROID    90 tablet    Take 1 tablet (200 mcg) by mouth daily    Hypothyroidism       liraglutide 18 MG/3ML soln    VICTOZA PEN    27 mL    Inject 1.8 mg Subcutaneous daily    Diabetes mellitus type 1 (H)       metoprolol 50 MG 24 hr tablet    TOPROL-XL    90 tablet    Take 0.5 tablets (25 mg) by mouth At Bedtime    Chronic diastolic heart failure (H)       morphine 15 MG 12 hr tablet    MS CONTIN     Take 15 mg by mouth daily as needed        Nebulizer Compressor Kit     1 kit    1 Device 4 times daily as needed.    COPD (chronic obstructive pulmonary disease) (H)       nicotine 14 MG/24HR 24 hr patch    [employer] NICOTINE    30 patch    Place 1 patch onto the skin every 24 hours    Tobacco abuse       NovoLOG FLEXPEN 100 UNIT/ML injection   Generic drug:  insulin aspart     60 mL    Inject 60 units with meals plus correction. Pt uses approx 180 units in 24 hrs.        nystatin cream    MYCOSTATIN    90 g    Apply topically 2 times daily To toenails    Dermatophytosis of nail       order for DME     1 each    Use your CPAP device as directed by your provider. Pressure change to min 13 max 18cwp         * order for DME     1 each    Equipment being ordered: Challenger Wide walker if available - patient needs seat, basket and brakes.    Dilated cardiomyopathy (H), Chronic systolic heart failure (H)       * order for DME     1 each    Equipment being ordered:  [id number] $92  Plantar, fasciitis, LG, night splint    Dermatophytosis of nail, Plantar fasciitis of right foot, Diabetic neuropathy with neurologic complication (H), Peroneal tendinitis, right       * order for DME     1 Units    Left foot    Left foot pain, Diabetic neuropathy with neurologic complication (H)       oxyCODONE-acetaminophen 5-325 MG per tablet    PERCOCET    60 tablet    Take 1 tablet by mouth every 8 hours as needed for moderate to severe pain (try to limit use, no further prescriptions until seen in pain clinic)    Lumbago, Bilateral low back pain with sciatica, sciatica laterality unspecified       polyethylene glycol powder    MIRALAX/GLYCOLAX          potassium chloride SA 20 MEQ CR tablet    potassium chloride    240 tablet    Take 2 tablets (40 mEq) by mouth 4 times daily    Hypokalemia       senna 8.6 MG tablet    SENOKOT    45 tablet    Take 1 tablet by mouth 2 times daily    Constipation       spironolactone 50 MG tablet    ALDACTONE    30 tablet    Take 1 tablet (50 mg) by mouth daily        tiotropium 18 MCG capsule    SPIRIVA HANDIHALER    30 capsule    Inhale contents of one capsule daily.    COPD (chronic obstructive pulmonary disease) (H)       topiramate 25 MG tablet    TOPAMAX    180 tablet    Take 3 tablets (75 mg) by mouth 2 times daily    Morbid obesity, unspecified obesity type (H)       torsemide 100 MG tablet    DEMADEX    180 tablet    Take 1 tablet (100 mg) by mouth 2 times daily    Chronic diastolic heart failure (H)       warfarin 5 MG tablet    COUMADIN    100 tablet    Take 20 mg (four tabs) M and F and 15 mg (three tabs) all other days of the week or as directed by the Coumadin Clinic.    Long term  current use of anticoagulant therapy       * Notice:  This list has 3 medication(s) that are the same as other medications prescribed for you. Read the directions carefully, and ask your doctor or other care provider to review them with you.

## 2017-09-13 ENCOUNTER — HOSPITAL ENCOUNTER (OUTPATIENT)
Dept: CARDIAC REHAB | Facility: CLINIC | Age: 42
End: 2017-09-13
Attending: INTERNAL MEDICINE
Payer: MEDICARE

## 2017-09-13 LAB — GLUCOSE BLDC GLUCOMTR-MCNC: 151 MG/DL (ref 70–99)

## 2017-09-13 PROCEDURE — 93797 PHYS/QHP OP CAR RHAB WO ECG: CPT | Performed by: REHABILITATION PRACTITIONER

## 2017-09-13 PROCEDURE — 40000116 ZZH STATISTIC OP CR VISIT: Performed by: REHABILITATION PRACTITIONER

## 2017-09-13 PROCEDURE — 40000575 ZZH STATISTIC OP CARDIAC VISIT #2: Performed by: REHABILITATION PRACTITIONER

## 2017-09-13 PROCEDURE — 93798 PHYS/QHP OP CAR RHAB W/ECG: CPT | Performed by: REHABILITATION PRACTITIONER

## 2017-09-13 PROCEDURE — 00000146 ZZHCL STATISTIC GLUCOSE BY METER IP

## 2017-09-18 ENCOUNTER — ANTICOAGULATION THERAPY VISIT (OUTPATIENT)
Dept: ANTICOAGULATION | Facility: CLINIC | Age: 42
End: 2017-09-18

## 2017-09-18 ENCOUNTER — HOSPITAL ENCOUNTER (OUTPATIENT)
Dept: CARDIAC REHAB | Facility: CLINIC | Age: 42
End: 2017-09-18
Attending: INTERNAL MEDICINE
Payer: MEDICARE

## 2017-09-18 DIAGNOSIS — Z79.01 LONG-TERM (CURRENT) USE OF ANTICOAGULANTS: ICD-10-CM

## 2017-09-18 DIAGNOSIS — I48.91 ATRIAL FIBRILLATION (H): ICD-10-CM

## 2017-09-18 LAB — INR PPP: NORMAL (ref 0.86–1.14)

## 2017-09-18 PROCEDURE — 40000575 ZZH STATISTIC OP CARDIAC VISIT #2: Performed by: CLINICAL EXERCISE PHYSIOLOGIST

## 2017-09-18 PROCEDURE — 93798 PHYS/QHP OP CAR RHAB W/ECG: CPT

## 2017-09-18 PROCEDURE — 93797 PHYS/QHP OP CAR RHAB WO ECG: CPT | Mod: 59 | Performed by: CLINICAL EXERCISE PHYSIOLOGIST

## 2017-09-18 PROCEDURE — 40000116 ZZH STATISTIC OP CR VISIT

## 2017-09-18 NOTE — PROGRESS NOTES
ANTICOAGULATION FOLLOW-UP CLINIC VISIT    Patient Name:  Pricilla Brown  Date:  9/18/2017  Contact Type:  Telephone    SUBJECTIVE:     Patient Findings     Positives No Problem Findings    Comments Patient sample that was drawn today was clotted according to the labratory.  Patient agreed to be drawn again on Wednesday.           OBJECTIVE    INR   Date Value Ref Range Status   09/18/2017 Quantity not sufficient 0.86 - 1.14 Final     Comment:     Unsatisfactory specimen - clotted  OJSEPH TADEO NNP 9/18/17 1626 HL       Chromogenic Factor 10   Date Value Ref Range Status   04/08/2017 19 (L) 70 - 130 % Final     Comment:     Therapeutic Range:  A Chromogenic Factor 10 level of approximately 20-40%   inversely correlates with an INR of 2-3 for patients receiving Warfarin.   Chromogenic Factor 10 levels below 20% indicate an INR greater than 3 and   levels above 40% indicate an INR less than 2.         ASSESSMENT / PLAN  No question data found.  Anticoagulation Summary as of 9/18/2017     INR goal 2.0-2.5   Today's INR    Maintenance plan 20 mg (5 mg x 4) on Mon, Fri; 15 mg (5 mg x 3) all other days   Full instructions 20 mg on Mon, Fri; 15 mg all other days   Weekly total 115 mg   Plan last modified Velvet Asencio, RN (3/15/2017)   Next INR check 9/20/2017   Priority INR   Target end date Indefinite    Indications   Atrial fibrillation (H) [I48.91] [I48.91]  Long-term (current) use of anticoagulants [Z79.01] [Z79.01]         Anticoagulation Episode Summary     INR check location Clinic Lab    Preferred lab     Send INR reminders to [facility]    Comments Contact Patient at [phone]      Anticoagulation Care Providers     Provider Role Specialty Phone number    Percy Alcala MD  Cardiology [phone]            See the Encounter Report to view Anticoagulation Flowsheet and Dosing Calendar (Go to Encounters tab in chart review, and find the Anticoagulation Therapy Visit)    Spoke with  patient. Informed Pricilla that the lab sample she had drawn today was unable to be ran.  Laboratory notes state the sample was unusable due to clotting in the vial.  She will get her INR redrawn on Wednesday.  Luis Daniel Burgos RN

## 2017-09-18 NOTE — MR AVS SNAPSHOT
Pricilla Brown   9/18/2017   Anticoagulation Therapy Visit    Description:  42 year old female   Provider:  Luis Daniel Burgos, RN   Department:  [facility]           INR as of 9/18/2017     Today's INR       Anticoagulation Summary as of 9/18/2017     INR goal 2.0-2.5   Today's INR    Full instructions 20 mg on Mon, Fri; 15 mg all other days   Next INR check 9/20/2017    Indications   Atrial fibrillation (H) [I48.91] [I48.91]  Long-term (current) use of anticoagulants [Z79.01] [Z79.01]         September 2017 Details    Sun Mon Tue Wed Thu Fri Sat          1               2                 3               4               5               6               7               8               9                 10               11               12               13               14               15               16                 17               18      20 mg   See details      19      15 mg         20            21               22               23                 24               25               26               27               28               29               30                Date Details   09/18 This INR check       Date of next INR:  9/20/2017         How to take your warfarin dose     To take:  15 mg Take 3 of the 5 mg tablets.    To take:  20 mg Take 4 of the 5 mg tablets.

## 2017-09-27 ENCOUNTER — HOSPITAL ENCOUNTER (OUTPATIENT)
Dept: CARDIAC REHAB | Facility: CLINIC | Age: 42
End: 2017-09-27
Attending: INTERNAL MEDICINE
Payer: MEDICARE

## 2017-09-27 LAB
GLUCOSE BLDC GLUCOMTR-MCNC: 157 MG/DL (ref 70–99)
INR PPP: 2.33 (ref 0.86–1.14)

## 2017-09-27 PROCEDURE — 93797 PHYS/QHP OP CAR RHAB WO ECG: CPT | Performed by: CLINICAL EXERCISE PHYSIOLOGIST

## 2017-09-27 PROCEDURE — 85610 PROTHROMBIN TIME: CPT | Performed by: INTERNAL MEDICINE

## 2017-09-27 PROCEDURE — 40000575 ZZH STATISTIC OP CARDIAC VISIT #2: Performed by: CLINICAL EXERCISE PHYSIOLOGIST

## 2017-09-27 PROCEDURE — 40000116 ZZH STATISTIC OP CR VISIT: Performed by: CLINICAL EXERCISE PHYSIOLOGIST

## 2017-09-27 PROCEDURE — 93798 PHYS/QHP OP CAR RHAB W/ECG: CPT | Performed by: CLINICAL EXERCISE PHYSIOLOGIST

## 2017-09-27 PROCEDURE — 00000146 ZZHCL STATISTIC GLUCOSE BY METER IP

## 2017-09-27 PROCEDURE — 36415 COLL VENOUS BLD VENIPUNCTURE: CPT | Performed by: INTERNAL MEDICINE

## 2017-09-28 ENCOUNTER — ANTICOAGULATION THERAPY VISIT (OUTPATIENT)
Dept: ANTICOAGULATION | Facility: CLINIC | Age: 42
End: 2017-09-28

## 2017-09-28 DIAGNOSIS — Z79.01 LONG-TERM (CURRENT) USE OF ANTICOAGULANTS: ICD-10-CM

## 2017-09-28 DIAGNOSIS — I48.91 ATRIAL FIBRILLATION (H): ICD-10-CM

## 2017-09-28 NOTE — MR AVS SNAPSHOT
Pricilla Brown   9/28/2017   Anticoagulation Therapy Visit    Description:  42 year old female   Provider:  Alecia Jacobson, RN   Department:  [facility]           INR as of 9/28/2017     Today's INR 2.33 (9/27/2017)      Anticoagulation Summary as of 9/28/2017     INR goal 2.0-2.5   Today's INR 2.33 (9/27/2017)   Full instructions 20 mg on Mon, Fri; 15 mg all other days   Next INR check 10/25/2017    Indications   Atrial fibrillation (H) [I48.91] [I48.91]  Long-term (current) use of anticoagulants [Z79.01] [Z79.01]         September 2017 Details    Sun Mon Tue Wed Thu Fri Sat          1               2                 3               4               5               6               7               8               9                 10               11               12               13               14               15               16                 17               18               19               20               21               22               23                 24               25               26               27               28      15 mg   See details      29      20 mg         30      15 mg          Date Details   09/28 This INR check               How to take your warfarin dose     To take:  15 mg Take 3 of the 5 mg tablets.    To take:  20 mg Take 4 of the 5 mg tablets.           October 2017 Details    Sun Mon Tue Wed Thu Fri Sat     1      15 mg         2      20 mg         3      15 mg         4      15 mg         5      15 mg         6      20 mg         7      15 mg           8      15 mg         9      20 mg         10      15 mg         11      15 mg         12      15 mg         13      20 mg         14      15 mg           15      15 mg         16      20 mg         17      15 mg         18      15 mg         19      15 mg         20      20 mg         21      15 mg           22      15 mg         23      20 mg         24      15 mg         25            26               27                28                 29               30               31                    Date Details   No additional details    Date of next INR:  10/25/2017         How to take your warfarin dose     To take:  15 mg Take 3 of the 5 mg tablets.    To take:  20 mg Take 4 of the 5 mg tablets.

## 2017-09-28 NOTE — PROGRESS NOTES
ANTICOAGULATION FOLLOW-UP CLINIC VISIT    Patient Name:  Pricilla Brown  Date:  9/28/2017  Contact Type:  Telephone    SUBJECTIVE:     Patient Findings     Positives No Problem Findings           OBJECTIVE    INR   Date Value Ref Range Status   09/27/2017 2.33 (H) 0.86 - 1.14 Final     Chromogenic Factor 10   Date Value Ref Range Status   04/08/2017 19 (L) 70 - 130 % Final     Comment:     Therapeutic Range:  A Chromogenic Factor 10 level of approximately 20-40%   inversely correlates with an INR of 2-3 for patients receiving Warfarin.   Chromogenic Factor 10 levels below 20% indicate an INR greater than 3 and   levels above 40% indicate an INR less than 2.         ASSESSMENT / PLAN  INR assessment THER    Recheck INR In: 4 WEEKS    INR Location Clinic      Anticoagulation Summary as of 9/28/2017     INR goal 2.0-2.5   Today's INR 2.33 (9/27/2017)   Maintenance plan 20 mg (5 mg x 4) on Mon, Fri; 15 mg (5 mg x 3) all other days   Full instructions 20 mg on Mon, Fri; 15 mg all other days   Weekly total 115 mg   No change documented Alecia Jacobson, RN   Plan last modified Velvet Asencio RN (3/15/2017)   Next INR check 10/25/2017   Priority INR   Target end date Indefinite    Indications   Atrial fibrillation (H) [I48.91] [I48.91]  Long-term (current) use of anticoagulants [Z79.01] [Z79.01]         Anticoagulation Episode Summary     INR check location Clinic Lab    Preferred lab     Send INR reminders to [facility]    Comments Contact Patient at [phone]      Anticoagulation Care Providers     Provider Role Specialty Phone number    Percy Alcala MD  Cardiology [phone]            See the Encounter Report to view Anticoagulation Flowsheet and Dosing Calendar (Go to Encounters tab in chart review, and find the Anticoagulation Therapy Visit)    Spoke with Pricilla.    Alecia Jacobson, RN

## 2017-10-11 ENCOUNTER — HOSPITAL ENCOUNTER (OUTPATIENT)
Dept: CARDIAC REHAB | Facility: CLINIC | Age: 42
End: 2017-10-11
Attending: INTERNAL MEDICINE
Payer: MEDICARE

## 2017-10-11 VITALS — HEIGHT: 67 IN | WEIGHT: 293 LBS | BODY MASS INDEX: 45.99 KG/M2

## 2017-10-11 DIAGNOSIS — Z79.4 TYPE 2 DIABETES MELLITUS WITH HYPERGLYCEMIA, WITH LONG-TERM CURRENT USE OF INSULIN (H): ICD-10-CM

## 2017-10-11 DIAGNOSIS — E11.65 TYPE 2 DIABETES MELLITUS WITH HYPERGLYCEMIA, WITH LONG-TERM CURRENT USE OF INSULIN (H): ICD-10-CM

## 2017-10-11 LAB
GLUCOSE BLDC GLUCOMTR-MCNC: 178 MG/DL (ref 70–99)
GLUCOSE BLDC GLUCOMTR-MCNC: 229 MG/DL (ref 70–99)

## 2017-10-11 PROCEDURE — 40000116 ZZH STATISTIC OP CR VISIT: Performed by: CLINICAL EXERCISE PHYSIOLOGIST

## 2017-10-11 PROCEDURE — 93798 PHYS/QHP OP CAR RHAB W/ECG: CPT | Performed by: CLINICAL EXERCISE PHYSIOLOGIST

## 2017-10-11 PROCEDURE — 00000146 ZZHCL STATISTIC GLUCOSE BY METER IP

## 2017-10-11 RX ORDER — LANCETS
EACH MISCELLANEOUS
Qty: 450 EACH | Refills: 1 | Status: SHIPPED | OUTPATIENT
Start: 2017-10-11 | End: 2017-12-12

## 2017-10-11 RX ORDER — LANCETS
EACH MISCELLANEOUS
Qty: 408 EACH | Refills: 1 | Status: SHIPPED | OUTPATIENT
Start: 2017-10-11 | End: 2017-10-11

## 2017-10-11 ASSESSMENT — 6 MINUTE WALK TEST (6MWT)
GENDER SELECTION: FEMALE
FEMALE CALC: 986.78
MALE CALC: 1302.67
PREDICTED: 1310.62

## 2017-10-11 NOTE — TELEPHONE ENCOUNTER
blood glucose monitoring (ACCU-CHEK SMARTVIEW) test strip, lancets . Per medicare will only fill x 6 mths.  pharmacy [phone]  requests  clinic notes supporting testing frequency.

## 2017-10-11 NOTE — PROGRESS NOTES
10/11/17 1400   Session   Session 60 Day Individualized Treatment Plan   Certified through this date 11/15/17   Cardiac Rehab Assessment   Cardiac Rehab Assessment Patient has dilated cardiomyopathy with diastolic dysfunction. She has diabetes, graves disease, JYOTI, and is morbidly obese. She demonstrated significant deconditioning, unable to walk more than 15-30 seconds before needing to sit down and rest. Patient is very motivated to start in rehab sessions again, as she had a long talk with her cardiologist. She continues to work with weight management clinic and is very motivated at the moment. Patient is currently still smoking, but sounded very sincere about truly wanted to quit. Patient did not come up with a quit date, but kept her goal of wanting to set one. Patient did not want to do 6MWT today, but did the scifit for evaluation. Patient was able to tolerate short bouts of 30 seconds and rest 30-60 seconds for a total of 4 minutes. 9/25 Patient has only attended a few sessions of cardiac rehab. ITP forwarded for medical director review. She would continue to benefit from skilled therapy in order to monitor CV response to exercise, aid with smoking cessation, and help that patient increase her endurance.  10/11 Patient's attendance to rehab has been sporadic. Recently she has a death in the family and wasn't feeling emotionally ready to return to rehab. She returned today motivated to resume her exercise program. She would benefit from skilled therapy in order to monitor CV response to exercise, aid with weight loss, aid with smoking cessation, and help the patient establish and regular exercise program.   General Information   Treatment Diagnosis Systolic Heart Failure with Preserved EF   Date of Treatment Diagnosis 10/13/16   Secondary Treatment Diagnosis Diastolic Heart Failure   Significant Past CV History History of Heart Failure;Chronic AF   Comorbidities DM   Other Medical History JYOTI, Cellulitis,  "neuropathic pain, Asthma, Hypthyroidism, morbid obesity, diabetes, 2013 pulm embolism.    Lead up symptoms dyspnea, unable to lay flat, exhaustion, palpitation   Hospital Location [facility]   Hospital Discharge Date 10/20/16   Signs and Symptoms Post Hospital Discharge Palpitations;Fatigue;SOB;Lightheadedness;Anxiety   Outpatient Cardiac Rehab Start Date 08/15/17   Primary Physician Jamilah Bernal Kristi Kopec (wt mgnt clinic)   Primary Physician Follow Up Scheduled   Cardiologist Percy Alcala   Cardiologist Follow Up Completed   Ejection Fraction 35%   Risk Stratification High   Summary of Cath Report   Summary of Cath Report No information available   Living and Work Status    Living Arrangements and Social Status apartment;alone   Support System Check-in help as needed   Return to Employment No   Occupation customer service, telemarketing   Preventative Medications   CMS recommended medications Anticoagulants;Beta Blocker;Pneumonia vaccination   Falls Screen   Have you fallen two or more times in the past year? No   Have you fallen and had an injury in the past year? No   Referral Initiated to Physical Therapy Patient recently attended Physical Therapy   Pain   Patient Currently in Pain Unable to assess   Physical Assessments   Incisions Not applicable   Edema None   Right Lung Sounds not assessed   Left Lung Sounds not assessed   Limitations Orthopedic   Comments Back pain is chronic. Has rt foot pain from injury but appears under control   Individualized Treatment Plan   Monitored Sessions Scheduled 18   Monitored Sessions Attended 5   Oxygen   Supplemental Oxygen needed No   Nutrition Management - Weight Management   Assessment Re-assessment   Age 42   Weight (!) 207.7 kg (458 lb)   Height 1.702 m (5' 7.01\")   BMI (Calculated) 71.87   Goal Weight 113.4 kg (250 lb)   Initial Rate Your Plate Score. Dietary tool to assess eating patterns. Scores range from 24 to 72. The higher the score the healthier the eating " pattern. 47   Nutrition Management - Lipids   Lipids Labs Available   Date 10/26/17   Total Cholesterol 135   Triglycerides 151   HDL 37   LDL 68   Prescribed Lipid Medication No   Nutrition Management - Diabetes   Diabetes Type II   Do you Monitor BS at Home? Yes   Diabetes Medication Prescribed Yes, Insulin   Hb A1C Date: 02/07/17   Hb A1C Result: 8.8   Diabetes Comments diagnosed in 2015   Nutrition Management Summary   Dietary Recommendations Low Fat;Low Cholesterol;Low Sodium;Diabetic  (using meal replacement)   Stages of Change for Diet Compliance Preparation   Interventions Planned Attend Nutrition Education Class(es);Educate on Weight Management Principles;Educate on Benefits of Exercise  (cont with wt. mgnt clinic)   Patient Goals Goal #1   Goal #1 Description Pt would like to loose 1-2# per week, by using my fitness pal, incr. act and start reg  exercise   Goal #1 Target Date 11/07/17   Goal #1 Progress Towards Goal 10/11 Patient has not lost any weight since starting rehab. She states that she was using My Fitness Pal, but has not done so recently. She plans to start using it again soon.    Psychosocial Management   Psychosocial Assessment Re-assessment   Is there history of clinical depression or increased risk of depression? History of clinical depression  (on anti depressant medication)   Current Level of Stress per Patient Report Mild   Current Coping Skills Uses Stress Management/Relaxation Techniques;Has Positive Support System;Is on Medication for Depression/Anxiety  (music, social media, TV)   Initial Patient Health Questionnaire -9 Score (PHQ-9) for depression. 5-9 Minimal symptoms, 10-14 Minor depression, 15-19 Major depression, moderately severe, > 20 Major depression, severe  6   Initial [facility] Survey score.  Quality of Life:   If total score > 25 review individual areas where patient rated a 4 or 5.  Consider patients current medical condition and what role that plays on the score.    Adjust treatment protocol to improve areas of concern.  Consider the following:  PHQ9 score, DASI, and re-assessment within the next 30 days to assist with developing treatments.  33   Stages of Change Maintenance   Interventions Planned Patient to verbalize understanding of behavioral assessment results;Patient to verbalize understanding of negative impact of stress to personal health;Patient will recognize signs and symptoms of depression;Patient interested in implementing one strategy to reduce current level of stress/anxiety;Patient to attend stress management class(es);Patient will practice coping/stress management techniques;Will reassess stage of change at a later date. Patient currently in pre-contemplation for taking steps to improve and/or manage psychosocial health   Patient Goal Yes   Goal Description Pt would like to find positive replacement for smoking and eating for stress mgnt.    Goal Target Date 11/07/17   Psychosocial Comments main source of stress comes from disablity due to obesity. Patient reports smoking and eating are what she does when she is bored.   Other Core Components - Hypertension   History of or Diagnosis of Hypertension Yes   Currently taking Anti-Hypertensives Beta blocker;Nitrates   Other Core Components - Tobacco   History of Tobacco Use Yes   Tobacco Use Status Currently smoking - everyday   Tobacco Habit Cigarettes   Tobacco Use per Day (average) 10 per day   Years of Tobacco Use 20   Stages of Change Preparation   Other Core Components Summary   Interventions Planned None   Interventions In Progress or Completed Attended education class on Blood Pressure;Listed benefits of weight management;Educated on importance of maintaining low sodium diet;Educated on benefits of smoking cessation;Reassessed readiness to change and implemented appropriate process(es);Developed strategies to increase confidence to quit smoking;Initiated referral to [place] Quit Plan   Patient Goals Yes   Goal  #1 Description Pt would like to set a quit date for smoking by developing resources and plan for replacement.    Goal #1 Target Date 11/07/17   Goal #1 Progress Towards Goal 3/23 Give Quit plan contact information. 10/11 Patient is down to 5 cigarettes a day. She is not ready to make a  quit date at this time. Will continue to offer support.   Activity/Exercise History   Activity/Exercise Assessment Re-assessment   Activity/Exercise Status prior to event? Sedentary   Number of Days Currently participating in Moderate Physical Activity? 0   Number of Days Currently performing  Aerobic Exercise (including rehab)? 0   Number of Minutes per Session Currently of Aerobic Exercise (average)? 0   Current Stage of Change (Physical Activity) Preparation   Current Stage of Change (Aerobic Exercise) Preparation   Patient Goals Goal #1;Goal #2   Goal #1 Description Pt would like to improve fitness to be able to walk for 20 min with one rest break, with stable cv response.    Goal #1 Target Date 11/07/17   Goal #1 Progress Towards Goal 10/11 Patient states that she is only able to tolerating walking 5 minutes before needing a rest break   Goal #2 Description Establish indep exercise program and doing ex on own 2-3 days per week.    Goal #2 Target Date 11/07/17   Goal #2 Progress Towards Goal 10/11 Patient has only been doing short 5 minute walks at home. She was given handouts on both upper and lower body muscle conditioning exercises.   Exercise Assessment   6 Minute Walk Predicted - Gender Selection Female   6 Minute Walk Predicted (Male) 1302.67   6 Minute Walk Predicted (Female) 986.78   Initial 6 Minute Walk Distance (Feet) (4 minutes of scifit)   Resting HR 85 bpm   Exercise  bpm   Post Exercise HR 90 bpm   Resting /78   Exercise /68   Post Exercise /64   Pre  mg/dL   Post  mg/dL   Effort Rating 7   Current MET Level 2.8   MET Level Goal 3   ECG Rhythm Sinus rhythm   Ectopy PVCs   Current  Symptoms Dyspnea;Fatigue;Joint pain   Limitations/Restrictions Orthopedic (see comments)  (chronic back pain)   Exercise Prescription   Mode Arm Ergometer;Weights  (scifit)   Duration/Time Intermittent bouts   Frequency 3 daysweek   THR (85% of age predicted max HR) 150.45   OMNI Effort Rating (0-10 Scale) 4-6/10   Progression Intermittent bouts;Aerobic exercise to OMNI rating of 5-7, and heart rate at or below target;Other (see comments)   Recommended Home Exercise   Type of Exercise Low Impact Calisthenics   Frequency (days per week) daily on off days from rehab   Duration (minutes per session) Intermittent   Effort Rating Recommended 4-6/10   30 Day Exercise Plan initiate low level calisthenics and /or walking, 2 min bouts   Current Home Exercise   Type of Exercise None   Follow-up/On-going Support   Provider follow-up needed on the following No follow-up needed   Learning Assessment   Learner Patient   Primary Language English   Preferred Learning Style Listening;Reading;Demonstration;Pictures/Video   Barriers to Learning No barriers noted   Patient Education   Education recommended Anatomy and Physiology of the Heart;Blood Pressure;Diabetes;Exercise Principles;Heart Failure;Medication Overview;Muscle Conditioning;Nutrition;Smoking Cessation;Stress Management;Weight Loss   Education classes attended Nutrition;Medication Overview;Anatomy and Physiology of the Heart   Physician cosignature/electronic signature indicates approval of this ITP document. I have established, reviewed and made necessary changes to the individualized treatment plan and exercise prescription for this patient.

## 2017-10-17 DIAGNOSIS — M54.50 LUMBAGO: ICD-10-CM

## 2017-10-17 RX ORDER — GABAPENTIN 300 MG/1
600 CAPSULE ORAL 2 TIMES DAILY
Qty: 120 CAPSULE | Refills: 0 | Status: SHIPPED | OUTPATIENT
Start: 2017-10-17 | End: 2018-02-07

## 2017-10-17 NOTE — TELEPHONE ENCOUNTER
Enmanuel Caldwell,  Can you let patient know she needs a PCP appt for further gabapentin refills?  Thanks,  Jamilah Bernal MD  Internal Medicine

## 2017-10-17 NOTE — TELEPHONE ENCOUNTER
gabapentin (NEURONTIN) 300 MG capsule      Last Written Prescription Date:  4/16/2017  Last Fill Quantity: 360,   # refills: 2  Last Office Visit : 5/31/2017  Future Office visit:  0    Creatinine   Date Value Ref Range Status   08/07/2017 1.16 (H) 0.52 - 1.04 mg/dL Final   ]      Routing refill request to provider for review/approval because:  Abnormal lab- HIGH creatinine.

## 2017-10-18 NOTE — PROGRESS NOTES
Cardiac Rehab Discharge Summary    Reason for discharge:    Pt had multiple no-shows. She had illness and family death that interferred with ability to participate, thus therapist tried to resched. but again she had multiple no-shows.     Progress towards goals:  Participation is too inconsistant to make progress. She is able to use limitted ex. modalities due to wt. restriction and her limited mobility. She tolerated 2-3, 10 min bouts of exercise at ~2.5METs.     Recommendation(s):    Due to pt's multiple failed attempts to participate in cardiac rehab, it does not appear realistic to return for more sessions. Unable to make progress.

## 2017-10-18 NOTE — ADDENDUM NOTE
Encounter addended by: Su Vigil OT on: 10/18/2017  4:57 PM<BR>     Actions taken: Sign clinical note, Follow-up modified, Episode resolved

## 2017-10-27 DIAGNOSIS — E66.01 MORBID OBESITY, UNSPECIFIED OBESITY TYPE (H): ICD-10-CM

## 2017-10-29 RX ORDER — TOPIRAMATE 25 MG/1
75 TABLET, FILM COATED ORAL 2 TIMES DAILY
Qty: 180 TABLET | Refills: 0 | Status: SHIPPED | OUTPATIENT
Start: 2017-10-29 | End: 2018-05-29

## 2017-10-30 NOTE — TELEPHONE ENCOUNTER
Last Written Prescription Date:  7/19/17  Last Fill Quantity: 180,   # refills: 1  Last Office Visit : 2/3/17  Future Office visit:  10/31/17

## 2017-11-24 DIAGNOSIS — I50.32 CHRONIC DIASTOLIC HEART FAILURE (H): ICD-10-CM

## 2017-11-24 RX ORDER — TORSEMIDE 100 MG/1
100 TABLET ORAL 2 TIMES DAILY
Qty: 180 TABLET | Refills: 1 | Status: SHIPPED | OUTPATIENT
Start: 2017-11-24 | End: 2018-12-30

## 2017-12-11 NOTE — PROGRESS NOTES
Atrial fibrillation    SOB   Chronic diastolic heart failure    Dilated cardiomyopathy    Chronic atrial fibrillation    Cellulitis  Long-term  use of anticoagulants    Plantar fasciitis  Neuropathic pain of lower extremity  Peritonsillar abscess  Asthma  Azotemia  Hypothyroidism following radioiodine therapy  JYOTI (obstructive sleep apnea) CPAP Min Pressure of 13 and Max Pressure of 18  Chronic anticoagulation for a-fib  Morbid obesity    Diabetes mellitus, type 2    The patient is scheduled to return for follow-up evaluation of cardiac rhythm with ZEO demonstrating NSVT and  atrial fibrillation    Heart catherization 2/2017 demonstrated elevated filling pressure and mild pulmonary hypertension compatible with type 2 PH    There is no interim history of increasing shortness of breath, orthopnea, PND, ankle edema, increasing abdominal girth, palpitation, pre-syncope, syncope, bleeding or thromboembolic complications.  The patient is taking medication regularly, following a low salt diet, and exercising regularly as outlined.    Alert, oriented, normal gait and station, normal mental, JVP within normal limits, HJR negative,thyroid not enlarged, mucous membranes clear, abdomen without tenderness, rebound or guarding, skin clear of lesion, PMI normal, S1 S2 regular rhythm, no gallop, no  Pathologic  edema    Wt Readings from Last 24 Encounters:   10/11/17 (!) 207.7 kg (458 lb)   09/25/17 (!) 210.9 kg (465 lb)   09/07/17 (!) 212.7 kg (469 lb)   08/07/17 (!) 210.9 kg (465 lb)   05/09/17 (!) 211.1 kg (465 lb 8 oz)   04/06/17 (!) 219.5 kg (484 lb)   03/23/17 (!) 209.1 kg (461 lb)   03/15/17 (!) 211.6 kg (466 lb 8 oz)   03/14/17 (!) 208.7 kg (460 lb)   02/13/17 (!) 209.5 kg (461 lb 12.8 oz)   02/07/17 (!) 211.9 kg (467 lb 1.6 oz)   02/06/17 (!) 211.4 kg (466 lb 0.8 oz)   02/03/17 (!) 211.4 kg (466 lb)   01/24/17 (!) 208.7 kg (460 lb 1.6 oz)   12/28/16 (!) 208.7 kg (460 lb)   12/28/16 (!) 209.4 kg (461 lb 9.6 oz)   11/14/16  (!) 206.7 kg (455 lb 11.2 oz)   11/10/16 (!) 207.1 kg (456 lb 8 oz)   10/13/16 (!) 206.9 kg (456 lb 3.2 oz)   09/06/16 (!) 206.2 kg (454 lb 9.6 oz)   08/30/16 (!) 209.6 kg (462 lb)   08/10/16 (!) 207.3 kg (457 lb)   07/11/16 (!) 212.5 kg (468 lb 8 oz)   06/16/16 (!) 208.7 kg (460 lb 3.2 oz)     T (MRN [id number]) as of 8/7/2017 11:10   Ref. Range 5/16/2017 10:16 5/31/2017 15:36 5/31/2017 15:48 8/7/2017 10:00   Sodium Latest Ref Range: 133 - 144 mmol/L  136     Potassium Latest Ref Range: 3.4 - 5.3 mmol/L  3.4     Chloride Latest Ref Range: 94 - 109 mmol/L  99     Carbon Dioxide Latest Ref Range: 20 - 32 mmol/L  30     Urea Nitrogen Latest Ref Range: 7 - 30 mg/dL  17     Creatinine Latest Ref Range: 0.52 - 1.04 mg/dL  0.92     GFR Estimate Latest Ref Range: >60 mL/min/1.7m2  66     GFR Estimate If Black Latest Ref Range: >60 mL/min/1.7m2  80     Calcium Latest Ref Range: 8.5 - 10.1 mg/dL  8.7     Anion Gap Latest Ref Range: 3 - 14 mmol/L  8     CRP Inflammation Latest Ref Range: 0.0 - 8.0 mg/L  71.7 (H)     Procalcitonin Latest Units: ng/ml  <0.05...     Uric Acid Latest Ref Range: 2.6 - 6.0 mg/dL  9.4 (H)     Glucose Latest Ref Range: 70 - 99 mg/dL  200 (H)     WBC Latest Ref Range: 4.0 - 11.0 10e9/L  13.6 (H)     Hemoglobin Latest Ref Range: 11.7 - 15.7 g/dL  12.7     Hematocrit Latest Ref Range: 35.0 - 47.0 %  39.6     Platelet Count Latest Ref Range: 150 - 450 10e9/L  337     RBC Count Latest Ref Range: 3.8 - 5.2 10e12/L  4.35     MCV Latest Ref Range: 78 - 100 fl  91     MCH Latest Ref Range: 26.5 - 33.0 pg  29.2     MCHC Latest Ref Range: 31.5 - 36.5 g/dL  32.1     RDW Latest Ref Range: 10.0 - 15.0 %  15.4 (H)     INR Latest Ref Range: 0.86 - 1.14  2.83 (H) 2.44 (H)  2.19 (H)   XR FOOT LT G/E 3 VW Unknown   Rpt      Current Outpatient Prescriptions   Medication     torsemide (DEMADEX) 100 MG tablet     topiramate (TOPAMAX) 25 MG tablet     gabapentin (NEURONTIN) 300 MG capsule     blood glucose monitoring  (ACCU-CHEK SMARTVIEW) test strip     blood glucose monitoring (ACCU-CHEK FASTCLIX) lancets     colchicine 0.6 MG tablet     hydrALAZINE (APRESOLINE) 25 MG tablet     warfarin (COUMADIN) 5 MG tablet     potassium chloride SA (POTASSIUM CHLORIDE) 20 MEQ CR tablet     insulin pen needle (BD RIVER U/F) 32G X 4 MM     insulin glargine U-300 (TOUJEO) 300 UNIT/ML injection     buPROPion (WELLBUTRIN SR) 100 MG 12 hr tablet     diclofenac (VOLTAREN) 1 % GEL topical gel     clotrimazole (LOTRIMIN) 1 % cream     ciclopirox 8 % SOLN     Efinaconazole 10 % SOLN     acetaminophen (TYLENOL) 325 MG tablet     diazepam (VALIUM) 5 MG tablet     NOVOLOG FLEXPEN 100 UNIT/ML soln     polyethylene glycol (MIRALAX/GLYCOLAX) powder     levothyroxine (SYNTHROID/LEVOTHROID) 200 MCG tablet     morphine (MS CONTIN) 15 MG 12 hr tablet     order for DME     liraglutide (VICTOZA PEN) 18 MG/3ML soln     metoprolol (TOPROL-XL) 50 MG 24 hr tablet     order for DME     capsaicin (ZOSTRIX) 0.025 % CREA     order for DME     nicotine ([employer] NICOTINE) 14 MG/24HR patch 2h hr     nystatin (MYCOSTATIN) cream     spironolactone (ALDACTONE) 50 MG tablet     oxyCODONE-acetaminophen (PERCOCET) 5-325 MG per tablet     DULoxetine (CYMBALTA) 20 MG capsule     senna (SENOKOT) 8.6 MG tablet     bisacodyl (DULCOLAX) 10 MG suppository     Blood Glucose Monitoring Suppl (ACCU-CHEK RON PLUS) W/DEVICE KIT     tiotropium (SPIRIVA HANDIHALER) 18 MCG inhalation capsule     Respiratory Therapy Supplies (NEBULIZER COMPRESSOR) KIT     ORDER FOR DME     albuterol (PROAIR HFA, PROVENTIL HFA, VENTOLIN HFA) 108 (90 BASE) MCG/ACT inhaler     No current facility-administered medications for this visit.      Blood pressure appears to be adequately controlled    Plan:  1. Continue to follow with primary care regarding weight loss, blood pressure control and sleep disordered breathing..  2. Laboatories: BNP, CRP, CMP  3. The crux of the patient's problem/s is her inability to lose  weight.  She is also irregular in her appointments.    4. Water aerobics has been tolerated in the past.    5. Should be seeing ANP/primary care or bariatrics in rotation on a monthly basis in order to facilitate weight loss to qualify for bariatric surgery.   6. Patient noted to very high CRP that seems to be higher than explained by weight.  She will have rechecked today.

## 2017-12-12 ENCOUNTER — TELEPHONE (OUTPATIENT)
Dept: ENDOCRINOLOGY | Facility: CLINIC | Age: 42
End: 2017-12-12

## 2017-12-12 ENCOUNTER — PRE VISIT (OUTPATIENT)
Dept: CARDIOLOGY | Facility: CLINIC | Age: 42
End: 2017-12-12

## 2017-12-12 ENCOUNTER — OFFICE VISIT (OUTPATIENT)
Dept: CARDIOLOGY | Facility: CLINIC | Age: 42
End: 2017-12-12
Attending: INTERNAL MEDICINE
Payer: MEDICARE

## 2017-12-12 ENCOUNTER — ANTICOAGULATION THERAPY VISIT (OUTPATIENT)
Dept: ANTICOAGULATION | Facility: CLINIC | Age: 42
End: 2017-12-12

## 2017-12-12 ENCOUNTER — ALLIED HEALTH/NURSE VISIT (OUTPATIENT)
Dept: SURGERY | Facility: CLINIC | Age: 42
End: 2017-12-12

## 2017-12-12 VITALS
SYSTOLIC BLOOD PRESSURE: 142 MMHG | HEART RATE: 96 BPM | WEIGHT: 293 LBS | OXYGEN SATURATION: 99 % | BODY MASS INDEX: 45.99 KG/M2 | DIASTOLIC BLOOD PRESSURE: 83 MMHG | HEIGHT: 67 IN

## 2017-12-12 DIAGNOSIS — R06.09 DOE (DYSPNEA ON EXERTION): ICD-10-CM

## 2017-12-12 DIAGNOSIS — I48.91 ATRIAL FIBRILLATION (H): ICD-10-CM

## 2017-12-12 DIAGNOSIS — Z79.01 LONG-TERM (CURRENT) USE OF ANTICOAGULANTS: ICD-10-CM

## 2017-12-12 DIAGNOSIS — E11.65 TYPE 2 DIABETES MELLITUS WITH HYPERGLYCEMIA, WITH LONG-TERM CURRENT USE OF INSULIN (H): ICD-10-CM

## 2017-12-12 DIAGNOSIS — Z79.4 TYPE 2 DIABETES MELLITUS WITH HYPERGLYCEMIA, WITH LONG-TERM CURRENT USE OF INSULIN (H): ICD-10-CM

## 2017-12-12 DIAGNOSIS — I42.5 OTHER RESTRICTIVE CARDIOMYOPATHY (H): ICD-10-CM

## 2017-12-12 DIAGNOSIS — I42.5 OTHER RESTRICTIVE CARDIOMYOPATHY (H): Primary | ICD-10-CM

## 2017-12-12 DIAGNOSIS — E11.9 DIABETES MELLITUS, TYPE 2 (H): ICD-10-CM

## 2017-12-12 LAB
ALBUMIN SERPL-MCNC: 2.9 G/DL (ref 3.4–5)
ALP SERPL-CCNC: 88 U/L (ref 40–150)
ALT SERPL W P-5'-P-CCNC: 14 U/L (ref 0–50)
ANION GAP SERPL CALCULATED.3IONS-SCNC: 9 MMOL/L (ref 3–14)
AST SERPL W P-5'-P-CCNC: 11 U/L (ref 0–45)
BILIRUB SERPL-MCNC: 0.3 MG/DL (ref 0.2–1.3)
BUN SERPL-MCNC: 17 MG/DL (ref 7–30)
CALCIUM SERPL-MCNC: 8.4 MG/DL (ref 8.5–10.1)
CHLORIDE SERPL-SCNC: 100 MMOL/L (ref 94–109)
CO2 SERPL-SCNC: 27 MMOL/L (ref 20–32)
CREAT SERPL-MCNC: 1.26 MG/DL (ref 0.52–1.04)
CRP SERPL-MCNC: 75.1 MG/L (ref 0–8)
GFR SERPL CREATININE-BSD FRML MDRD: 46 ML/MIN/1.7M2
GLUCOSE SERPL-MCNC: 194 MG/DL (ref 70–99)
INR PPP: 2.09 (ref 0.86–1.14)
NT-PROBNP SERPL-MCNC: 37 PG/ML (ref 0–125)
POTASSIUM SERPL-SCNC: 3.6 MMOL/L (ref 3.4–5.3)
PROT SERPL-MCNC: 8.5 G/DL (ref 6.8–8.8)
SODIUM SERPL-SCNC: 136 MMOL/L (ref 133–144)

## 2017-12-12 PROCEDURE — 83880 ASSAY OF NATRIURETIC PEPTIDE: CPT

## 2017-12-12 PROCEDURE — 85610 PROTHROMBIN TIME: CPT

## 2017-12-12 PROCEDURE — 99214 OFFICE O/P EST MOD 30 MIN: CPT | Mod: ZP | Performed by: INTERNAL MEDICINE

## 2017-12-12 PROCEDURE — 36415 COLL VENOUS BLD VENIPUNCTURE: CPT

## 2017-12-12 PROCEDURE — 80053 COMPREHEN METABOLIC PANEL: CPT

## 2017-12-12 PROCEDURE — 86140 C-REACTIVE PROTEIN: CPT

## 2017-12-12 PROCEDURE — 99212 OFFICE O/P EST SF 10 MIN: CPT | Mod: ZF

## 2017-12-12 PROCEDURE — 86140 C-REACTIVE PROTEIN: CPT | Performed by: INTERNAL MEDICINE

## 2017-12-12 RX ORDER — LANCETS
EACH MISCELLANEOUS
Qty: 500 EACH | Refills: 3 | Status: SHIPPED | OUTPATIENT
Start: 2017-12-12 | End: 2018-05-28

## 2017-12-12 ASSESSMENT — PAIN SCALES - GENERAL: PAINLEVEL: NO PAIN (0)

## 2017-12-12 NOTE — PATIENT INSTRUCTIONS
GOALS:  Relating To Eating:  -Plan A:  Follow the Modified Liquid Diet for weight loss:  Breakfast: Protein Shake (Premier Protein)  Lunch: 3 oz lean protein + non-starchy vegetables  Supper: Protein Shake (Premier Protein)  Snack: non-starchy vegetables (no calorie-containing dips/condiments)  Beverages: at least 48-64 oz water between meals daily    *Protein Shake Criteria: ~200 Calories, at least 20 grams of protein, and less than 10 grams of sugar     -Plan B (if plan A is backfiring on you): Track food intake prior to eating on [email or web address].com with a target of no more than 1,800 Calories for 2 lbs/week weight loss, 2400 mg sodium or less/day would be great.   *Have 1 protein meal replacement shake daily (Premier Protein).   *Eat as much non-starchy vegetables as you need to fill you up (no calorie-containing dips/condiments).    (non-starchy veg: green leafy vegetables, cucumbers, broccoli, cauliflower, green string beans)   *Have 1 fruit daily.   *Drink at least 64 oz water between meals daily    -Eat slowly (20-30 minutes per meal), chewing foods well (25 chews per bite/applesauce consistency)    -Increase exercise as able.  (Pool therapy, walking, etc)    -Continue to work on quitting smoking.         PRE-OPERATIVE WEIGHT LOSS SURGERY TASKLIST (May 2016)  Call Sylvia at [phone] for any questions regarding tasks on this list  Tentative surgery: Sleeve  Approximate Month and Year: Nov 2016    Surgeon: Che German Insurance Coverage: Medicare  Exclusions: None  __x__Seminar viewed.   __x__Research Consent Form signed.    __x__Registered on EpicPledge.    __x__Received Decision Making Handout to read.   __x__Received information about Support Group, 1st Wednesday of the month at         the [facility], 6:30p - 8:00pm.  ____Letter of support from primary care provider.  ____Labs to be drawn by primary care provider:            Complete Blood Count (CBC), Comprehensive metabolic panel (CMP),                   Parathormone (PTH) and Vitamin D level  ____Lose 46 lbs prior to surgery from today`s consult weight of 462 lbs   ____Exercise goal: 20 mins., 5 days a week; increase as tolerated.   _x__Dietician visit at initial consult           ____Dietician visit month 2   ____Dietician visit month 3   ____Dietician visit month 4                       ____Dietician visit month 5   ____Dietician visit month 6  ____Psychologist evaluation   ____Sleep Medicine Provider clearance  (May require a sleep study)  ____Cardiologist clearance.  ____Medical Weight Management. Continue with Dr Ascencio  ____Hematology clearance due to hx of PE in 2012.  Unknown cause.  ____Cotinine level  _____See your surgeon Dr Bolton in 1 month to discuss surgical candidacy and weight loss requirement.  _____After your visit with the surgeon, call Doug at [phone] to finalize the surgery date and schedule your final appointments to be 3 weeks before your surgery date.   _____A pre-operative history and physical  to be done within 30 days of the surgery can be done at the pre-assessment clinic.   FINAL APPOINTMENTS  _____Weigh-in/Nurse Visit at Clinic 1E, 3 weeks before surgery.                         Plan to be at your goal weight for this visit.    _____Pre-operative Weight Loss Surgery Class, 3 weeks before surgery.   _____Pre-assessment Clinic, to be done 3 weeks before surgery. Meet with the anesthesia team.     DISCLAIMER: Based on the evaluation results, the bariatric team decides whether and which bariatric surgery is the best option for you. Sometimes, even if you meet all of the pre-operative criteria, the bariatric team may still determine that in their medical judgement surgery is not recommended because of the risks to you.     CONTACT INFORMATION  ___If questions prior to surgery, call Sylvia Rosas RN at [phone].   ___If questions about insurance or scheduling surgery, call Doug at                       [phone].  ___If  questions after surgery and to schedule 1E clinic appts, please call our Call                     Center at [phone].  ___Fax: [phone] (preoperative documents, FMLA or return to work forms).

## 2017-12-12 NOTE — PATIENT INSTRUCTIONS
You were seen at the [facility] today.  You saw Dr. Alcala  Here are your Instructions:    1.  Labs today on way out.  2.  See us back in May.      Adoela Petit RN  Nurse Care Coordinator  Office:  [phone] option #1, then #3 & ask for Adeola (nurse line)  Fax:  [phone]  After Hours:  [phone]  Appointments:  [phone] option #1, then option #1

## 2017-12-12 NOTE — MR AVS SNAPSHOT
Pricilla Brown   12/12/2017   Anticoagulation Therapy Visit    Description:  42 year old female   Provider:  Dorene dEge, RN   Department:  [facility]           INR as of 12/12/2017     Today's INR 2.09      Anticoagulation Summary as of 12/12/2017     INR goal 2.0-2.5   Today's INR 2.09   Full instructions 20 mg on Mon, Fri; 15 mg all other days   Next INR check 1/16/2018    Indications   Atrial fibrillation (H) [I48.91] [I48.91]  Long-term (current) use of anticoagulants [Z79.01] [Z79.01]         December 2017 Details    Sun Mon Tue Wed Thu Fri Sat          1               2                 3               4               5               6               7               8               9                 10               11               12      15 mg   See details      13      15 mg         14      15 mg         15      20 mg         16      15 mg           17      15 mg         18      20 mg         19      15 mg         20      15 mg         21      15 mg         22      20 mg         23      15 mg           24      15 mg         25      20 mg         26      15 mg         27      15 mg         28      15 mg         29      20 mg         30      15 mg           31      15 mg                Date Details   12/12 This INR check               How to take your warfarin dose     To take:  15 mg Take 3 of the 5 mg tablets.    To take:  20 mg Take 4 of the 5 mg tablets.           January 2018 Details    Sun Mon Tue Wed Thu Fri Sat      1      20 mg         2      15 mg         3      15 mg         4      15 mg         5      20 mg         6      15 mg           7      15 mg         8      20 mg         9      15 mg         10      15 mg         11      15 mg         12      20 mg         13      15 mg           14      15 mg         15      20 mg         16            17               18               19               20                 21               22               23               24                25               26               27                 28               29               30               31                   Date Details   No additional details    Date of next INR:  1/16/2018         How to take your warfarin dose     To take:  15 mg Take 3 of the 5 mg tablets.    To take:  20 mg Take 4 of the 5 mg tablets.

## 2017-12-12 NOTE — NURSING NOTE
Chief Complaint   Patient presents with     Follow Up For     hf f/u     Vitals were taken and medications were reconciled.  Manas Urias, HEATHER  9:05 AM

## 2017-12-12 NOTE — MR AVS SNAPSHOT
After Visit Summary   12/12/2017    Pricilla Brown    MRN: [id number]           Patient Information     Date Of Birth          1975        Visit Information        Provider Department      12/12/2017 8:30 AM Percy Alcala MD [facility]        Today's Diagnoses     Other restrictive cardiomyopathy (H)    -  1    MURRAY (dyspnea on exertion)          Care Instructions    You were seen at the [facility] clinic today.  You saw Dr. Alcala  Here are your Instructions:    1.  Labs today on way out.  2.  See us back in May.      Adeola Petit RN  Nurse Care Coordinator  Office:  [phone] option #1, then #3 & ask for Adeola (nurse line)  Fax:  [phone]  After Hours:  [phone]  Appointments:  [phone] option #1, then option #1                        Follow-ups after your visit        Additional Services     Follow-Up with Cardiologist                 Your next 10 appointments already scheduled     Dec 12, 2017 10:00 AM CST   (Arrive by 9:45 AM)   NUTRITION VISIT with Paulette Ruiz RD   [facility] Surgical Weight Management ([facility])    [address] [place] [id number] [phone]            Dec 26, 2017  8:00 AM CST   RETURN GENERAL with Tom Guardado MD   Eye Clinic ([facility])    Gallo Darnell [address]  [facility] [address]   [phone]            Jan 05, 2018 11:30 AM CST   (Arrive by 11:15 AM)   RETURN DIABETES with Isela Archibald PA-C   [facility] Endocrinology ([facility])    [address] [place] [id number] [phone]            Jan 16, 2018 11:45 AM CST   (Arrive by 11:30 AM)   Return Visit with Silva Damon MD   [facility] Medical Weight Management ([facility])    [address] [place] [address]  [address]   [phone]            Mar 20, 2018  1:00 PM CDT   Return Sleep Patient with YOCASTA Horan CNP   [facility] [place], Sleep Study ([facility])    [address] [place] [id number]   [phone]            May 29, 2018  8:30 AM CDT   (Arrive by 8:15 AM)   RETURN HEART FAILURE with Percy Alcala MD   [facility] ([facility])    [address] [place] [id number] [phone]              Future tests that were ordered for you today     Open Future Orders        Priority Expected Expires Ordered    Follow-Up with Cardiologist Routine 5/19/2018 3/19/2019 12/12/2017    Comprehensive metabolic panel Routine 12/19/2017 3/19/2018 12/12/2017    N terminal pro BNP outpatient Routine 12/19/2017 3/19/2018 12/12/2017            Who to contact     If you have questions or need follow up information about today's clinic visit or your schedule please contact [facility] directly at [phone].  Normal or non-critical lab and imaging results will be communicated to you by MyChart, letter or phone within 4 business days after the clinic has received the results. If you do not hear from us within 7 days, please contact the clinic through [employer] or phone. If you have a critical or abnormal lab result, we will notify you by phone as soon as possible.  Submit refill requests through [employer] or call your pharmacy and they will forward the refill request to us. Please allow 3 business days for your refill to be completed.          Additional Information About Your Visit        [employer] Information     [employer] [facility] gives you secure access to your electronic health record. If you see a primary care provider, you can also send messages to your care team and make appointments. If you have questions, please call your primary care clinic.  If you do not have a primary care provider, please call  "[phone] and they will assist you.        Care EveryWhere ID     This is your Care EveryWhere ID. This could be used by other organizations to access your [place] medical records  [id number]        Your Vitals Were     Pulse Height Pulse Oximetry BMI (Body Mass Index)          96 1.702 m (5' 7\") 99% 71.94 kg/m2         Blood Pressure from Last 3 Encounters:   12/12/17 142/83   09/08/17 131/77   08/07/17 133/81    Weight from Last 3 Encounters:   12/12/17 (!) 208.3 kg (459 lb 4.8 oz)   10/11/17 (!) 207.7 kg (458 lb)   09/25/17 (!) 210.9 kg (465 lb)              We Performed the Following     CRP, inflammation        Primary Care Provider Office Phone # Fax #    Jamilah Bernal [phone]       [address] [place] [phone]  [facility] [address]        Equal Access to Services     [facility] : Hadii aad ku hadasho Soomaali, waaxda luqadaha, qaybta kaalmada adeegyada, waxay idiin hayprospern galileo hurt . So [facility] [phone].    ATENCIÓN: Si habla español, tiene a de la rosa disposición servicios gratuitos de asistencia lingüística. Keyonnaame al [phone].    We comply with applicable federal civil rights laws and [place] laws. We do not discriminate on the basis of race, color, national origin, age, disability, sex, sexual orientation, or gender identity.            Thank you!     Thank you for choosing [facility]  for your care. Our goal is always to provide you with excellent care. Hearing back from our patients is one way we can continue to improve our services. Please take a few minutes to complete the written survey that you may receive in the mail after your visit with us. Thank you!             Your Updated Medication List - Protect others around you: Learn how to safely use, store and throw away your medicines at www.disposemymeds.org.          This list is accurate as of: 12/12/17  9:54 AM.  Always use your most recent med list.                   Brand Name Dispense " Instructions for use Diagnosis    acetaminophen 325 MG tablet    TYLENOL    180 tablet    Take 2 tablets (650 mg) by mouth 3 times daily as needed for mild pain or fever (total acetaminophen dose should not exceed 3000 mg per day)    Neuropathic pain of lower extremity, unspecified laterality       albuterol 108 (90 BASE) MCG/ACT Inhaler    PROAIR HFA/PROVENTIL HFA/VENTOLIN HFA    1 Inhaler    Inhale 2 puffs into the lungs every 6 hours as needed.    Reactive airway disease       bisacodyl 10 MG Suppository    DULCOLAX    6 suppository    Place 1 suppository (10 mg) rectally daily as needed for constipation    Constipation       blood glucose monitoring lancets     450 each    Use to test blood sugar 5 times daily or as directed.    Type 2 diabetes mellitus with hyperglycemia, with long-term current use of insulin (H)       blood glucose monitoring meter device kit     1 kit    Use to test blood sugars 2 times daily or as directed.    Type II or unspecified type diabetes mellitus without mention of complication, not stated as uncontrolled       blood glucose monitoring test strip    ACCU-CHEK SMARTVIEW    450 each    Use to test blood sugar 5 times daily    Type 2 diabetes mellitus with hyperglycemia, with long-term current use of insulin (H)       buPROPion 100 MG 12 hr tablet    WELLBUTRIN SR    180 tablet    Take 1 tablet (100 mg) by mouth 2 times daily    Tobacco abuse       capsaicin 0.025 % Crea cream    ZOSTRIX    1 Tube    Apply 1 g topically 3 times daily as needed    Dermatophytosis of nail       ciclopirox 8 % Soln     1 Bottle    Externally apply topically daily To toenails.    Dermatophytosis of nail       clotrimazole 1 % cream    LOTRIMIN    30 g    Apply topically 2 times daily    Tinea pedis of right foot       colchicine 0.6 MG tablet     12 tablet    Take 1-2 tablets (0.6-1.2 mg) by mouth daily as needed for moderate pain    Acute gouty arthritis       diazepam 5 MG tablet    VALIUM    1 tablet     Hand carry to procedure on 2/21. Do not take until instructed.    Claustrophobia       diclofenac 1 % Gel topical gel    VOLTAREN    100 g    Apply 4 grams to knees or 2 grams to hands four times daily using enclosed dosing card.    Left foot pain       DULoxetine 20 MG EC capsule    CYMBALTA    60 capsule    Take 1 capsule (20 mg) by mouth 2 times daily    Depression       Efinaconazole 10 % Soln     8 mL    Externally apply topically daily To toenails.    Dermatophytosis of nail       gabapentin 300 MG capsule    NEURONTIN    120 capsule    Take 2 capsules (600 mg) by mouth 2 times daily Needs PCP appt for further refills    Lumbago       hydrALAZINE 25 MG tablet    APRESOLINE    450 tablet    Take 1 tab (25 mg) in am, 2 tabs (50 mg) midday, and 2 tabs (50 mg) in pm daily    Dilated cardiomyopathy (H)       insulin glargine U-300 300 UNIT/ML injection    TOUJEO    9 mL    Inject 160 units SQ each am.    Diabetes mellitus, type 2 (H)       insulin pen needle 32G X 4 MM    BD RIVER U/F    550 each    Use 6 daily or as directed.    Diabetes mellitus, type 2 (H)       levothyroxine 200 MCG tablet    SYNTHROID/LEVOTHROID    90 tablet    Take 1 tablet (200 mcg) by mouth daily    Hypothyroidism       liraglutide 18 MG/3ML soln    VICTOZA PEN    27 mL    Inject 1.8 mg Subcutaneous daily    Diabetes mellitus type 1 (H)       metoprolol 50 MG 24 hr tablet    TOPROL-XL    90 tablet    Take 0.5 tablets (25 mg) by mouth At Bedtime    Chronic diastolic heart failure (H)       morphine 15 MG 12 hr tablet    MS CONTIN     Take 15 mg by mouth daily as needed        Nebulizer Compressor Kit     1 kit    1 Device 4 times daily as needed.    COPD (chronic obstructive pulmonary disease) (H)       nicotine 14 MG/24HR 24 hr patch    [employer] NICOTINE    30 patch    Place 1 patch onto the skin every 24 hours    Tobacco abuse       NovoLOG FLEXPEN 100 UNIT/ML injection   Generic drug:  insulin aspart     60 mL    Inject 60 units with meals plus  correction. Pt uses approx 180 units in 24 hrs.        nystatin cream    MYCOSTATIN    90 g    Apply topically 2 times daily To toenails    Dermatophytosis of nail       order for DME     1 each    Use your CPAP device as directed by your provider. Pressure change to min 13 max 18cwp        * order for DME     1 each    Equipment being ordered: Challenger Wide walker if available - patient needs seat, basket and brakes.    Dilated cardiomyopathy (H), Chronic systolic heart failure (H)       * order for DME     1 each    Equipment being ordered:  [id number] $92  Plantar, fasciitis, LG, night splint    Dermatophytosis of nail, Plantar fasciitis of right foot, Diabetic neuropathy with neurologic complication (H), Peroneal tendinitis, right       * order for DME     1 Units    Left foot    Left foot pain, Diabetic neuropathy with neurologic complication (H)       oxyCODONE-acetaminophen 5-325 MG per tablet    PERCOCET    60 tablet    Take 1 tablet by mouth every 8 hours as needed for moderate to severe pain (try to limit use, no further prescriptions until seen in pain clinic)    Lumbago, Bilateral low back pain with sciatica, sciatica laterality unspecified       polyethylene glycol powder    MIRALAX/GLYCOLAX          potassium chloride SA 20 MEQ CR tablet    KLOR-CON    240 tablet    Take 2 tablets (40 mEq) by mouth 4 times daily    Hypokalemia       senna 8.6 MG tablet    SENOKOT    45 tablet    Take 1 tablet by mouth 2 times daily    Constipation       spironolactone 50 MG tablet    ALDACTONE    30 tablet    Take 1 tablet (50 mg) by mouth daily        tiotropium 18 MCG capsule    SPIRIVA HANDIHALER    30 capsule    Inhale contents of one capsule daily.    COPD (chronic obstructive pulmonary disease) (H)       topiramate 25 MG tablet    TOPAMAX    180 tablet    Take 3 tablets (75 mg) by mouth 2 times daily    Morbid obesity, unspecified obesity type (H)       torsemide 100 MG tablet    DEMADEX    180 tablet     Take 1 tablet (100 mg) by mouth 2 times daily    Chronic diastolic heart failure (H)       warfarin 5 MG tablet    COUMADIN    100 tablet    Take 20 mg (four tabs) M and F and 15 mg (three tabs) all other days of the week or as directed by the Coumadin Clinic.    Long term current use of anticoagulant therapy       * Notice:  This list has 3 medication(s) that are the same as other medications prescribed for you. Read the directions carefully, and ask your doctor or other care provider to review them with you.

## 2017-12-12 NOTE — TELEPHONE ENCOUNTER
Pt called to request refill for Toujeo & test strips to be sent to [facility]. Pt is in clinic today.    Pt has 1 day supply left of Toujeo.     Pt also states the battery is dead on her BG monitor & cant afford to get a new one.     Please callback pt at [phone].

## 2017-12-12 NOTE — PROGRESS NOTES
"  Bariatric Nutrition Consultation Note    Reason For Visit: Nutrition Reassessment    Pricilla Brown is a 42 year-old (type 2 DM on insulin sliding scale) presenting today for bariatric nutrition consult.  Pt is interested in laparoscopic sleeve gastrectomy.  Pt has met all required RD visits prior to surgery.  Pt was referred by NANETTE Kerr and Dr. Silva Damon.    Coordination Notes: Reviewed task list from May 2016 with Pt and printed her another one.  Writer messaged nurse coordinator Sylvia Rosas regarding old task list (? If needing an update).      ANTHROPOMETRICS:  Ht: 67\"  Initial Wt: 462.6 lbs with BMI of 72.6  Current Wt: 458.9 lbs (-3.7 lbs from initial weight; - 10.3 lbs since last RD visit on 8/7/17)     Required weight loss goal pre-op: -46 lbs from initial consult weight (goal weight 416.6 lbs or less before surgery)    NUTRITION HISTORY:  Food Allergies/Intolerances: none known  Cravings: sweets, chips, pop (diet)  Triggers/cues causing extra eating: boredom (currently on disability, not working)  *Pt is on topiramate, but feels it no longer is working.  She plans to follow-up with Dr. Silva Damon soon.     Progress with Previous Goals:  Relating To Eating:  -Track food intake prior to eating on [email or web address].com with a target of no more than 1,800 Calories for 2 lbs/week weight loss, 2400 mg sodium or less/day would be great.- not meeting these past few months   *Have 1 protein meal replacement shake daily ([employer]).- not for about 1 month   *Eat as much non-starchy vegetables as you need to fill you up (no calorie-containing dips/condiments). - improving per Pt report    (non-starchy veg: green leafy vegetables, cucumbers, broccoli, cauliflower, green string beans)   *Have 1 fruit daily.- meeting   *Drink at least 64 oz water between meals daily- meeting    -Eat slowly (20-30 minutes per meal), chewing foods well (25 chews per bite/applesauce consistency)- meeting. "     -Increase exercise as able.  Cardiac rehab 3 days weekly for 60 min. - Pt injured foot, but has been walking 1 block daily lately since feeling better. She is done with cardiac rehab.  She hopes to start pool therapy if she can get it prescribed soon by PCP.     -Continue to work on quitting smoking. - Improving, just 2 cigarettes daily.    NUTRITION DIAGNOSIS:  Obesity r/t long history of self-monitoring deficit and excessive energy intake aeb BMI >30.- continues    INTERVENTION:  Intervention Provided/Education Provided: Praised Pt for weight lost.  Reviewed previous goals, encouraging re-attempting the modified liquid diet and to follow-up with medical weight management.  Encouraged exercise.  Discussed ways to optimize satiety (focusing on lean protein and non-starchy veg at the one solid meal/day, using non-starchy veg as fillers between meals), importance of checking BGs when switching diet to modified liquid and being in close contact with PCP/endocrinologist to adjust insulin as appropriate.  Emphasized the importance of mindful eating.  Gave encouragement and support.  Provided Pt with list of goals and task list.      Patient Understanding: good  Expected Compliance: good      GOALS:  Relating To Eating:  -Plan A:  Follow the Modified Liquid Diet for weight loss:  Breakfast: Protein Shake ([employer] Protein)  Lunch: 3 oz lean protein + non-starchy vegetables  Supper: Protein Shake ([employer] Protein)  Snack: non-starchy vegetables (no calorie-containing dips/condiments)  Beverages: at least 48-64 oz water between meals daily    *Protein Shake Criteria: ~200 Calories, at least 20 grams of protein, and less than 10 grams of sugar     -Plan B (if plan A is backfiring on you): Track food intake prior to eating on [email or web address].com with a target of no more than 1,800 Calories for 2 lbs/week weight loss, 2400 mg sodium or less/day would be great.   *Have 1 protein meal replacement shake daily (Premier  Protein).   *Eat as much non-starchy vegetables as you need to fill you up (no calorie-containing dips/condiments).    (non-starchy veg: green leafy vegetables, cucumbers, broccoli, cauliflower, green string beans)   *Have 1 fruit daily.   *Drink at least 64 oz water between meals daily    -Eat slowly (20-30 minutes per meal), chewing foods well (25 chews per bite/applesauce consistency)    -Increase exercise as able.  (Pool therapy, walking, etc)    -Continue to work on quitting smoking.     Follow-Up: follow-up with RD in 1 month.     Time spent with patient: 30 minutes.  Paulette Ruiz, MS, RDN, LDN, CLT  Pager: [phone]

## 2017-12-12 NOTE — LETTER
12/12/2017      RE: Pricilla Brown  [address]   [facility] [address]       Dear Colleague,    Thank you for the opportunity to participate in the care of your patient, Pricilla Brown, at the [facility]. Please see a copy of my visit note below.    Atrial fibrillation    SOB   Chronic diastolic heart failure    Dilated cardiomyopathy    Chronic atrial fibrillation    Cellulitis  Long-term  use of anticoagulants    Plantar fasciitis  Neuropathic pain of lower extremity  Peritonsillar abscess  Asthma  Azotemia  Hypothyroidism following radioiodine therapy  JYOTI (obstructive sleep apnea) CPAP Min Pressure of 13 and Max Pressure of 18  Chronic anticoagulation for a-fib  Morbid obesity    Diabetes mellitus, type 2    The patient is scheduled to return for follow-up evaluation of cardiac rhythm with ZEO demonstrating NSVT and  atrial fibrillation    Heart catherization 2/2017 demonstrated elevated filling pressure and mild pulmonary hypertension compatible with type 2 PH    There is no interim history of increasing shortness of breath, orthopnea, PND, ankle edema, increasing abdominal girth, palpitation, pre-syncope, syncope, bleeding or thromboembolic complications.  The patient is taking medication regularly, following a low salt diet, and exercising regularly as outlined.    Alert, oriented, normal gait and station, normal mental, JVP within normal limits, HJR negative,thyroid not enlarged, mucous membranes clear, abdomen without tenderness, rebound or guarding, skin clear of lesion, PMI normal, S1 S2 regular rhythm, no gallop, no  Pathologic  edema    Wt Readings from Last 24 Encounters:   10/11/17 (!) 207.7 kg (458 lb)   09/25/17 (!) 210.9 kg (465 lb)   09/07/17 (!) 212.7 kg (469 lb)   08/07/17 (!) 210.9 kg (465 lb)   05/09/17 (!) 211.1 kg (465 lb 8 oz)   04/06/17 (!) 219.5 kg (484 lb)   03/23/17 (!) 209.1 kg (461 lb)   03/15/17 (!) 211.6 kg (466 lb  8 oz)   03/14/17 (!) 208.7 kg (460 lb)   02/13/17 (!) 209.5 kg (461 lb 12.8 oz)   02/07/17 (!) 211.9 kg (467 lb 1.6 oz)   02/06/17 (!) 211.4 kg (466 lb 0.8 oz)   02/03/17 (!) 211.4 kg (466 lb)   01/24/17 (!) 208.7 kg (460 lb 1.6 oz)   12/28/16 (!) 208.7 kg (460 lb)   12/28/16 (!) 209.4 kg (461 lb 9.6 oz)   11/14/16 (!) 206.7 kg (455 lb 11.2 oz)   11/10/16 (!) 207.1 kg (456 lb 8 oz)   10/13/16 (!) 206.9 kg (456 lb 3.2 oz)   09/06/16 (!) 206.2 kg (454 lb 9.6 oz)   08/30/16 (!) 209.6 kg (462 lb)   08/10/16 (!) 207.3 kg (457 lb)   07/11/16 (!) 212.5 kg (468 lb 8 oz)   06/16/16 (!) 208.7 kg (460 lb 3.2 oz)     T (MRN [id number]) as of 8/7/2017 11:10   Ref. Range 5/16/2017 10:16 5/31/2017 15:36 5/31/2017 15:48 8/7/2017 10:00   Sodium Latest Ref Range: 133 - 144 mmol/L  136     Potassium Latest Ref Range: 3.4 - 5.3 mmol/L  3.4     Chloride Latest Ref Range: 94 - 109 mmol/L  99     Carbon Dioxide Latest Ref Range: 20 - 32 mmol/L  30     Urea Nitrogen Latest Ref Range: 7 - 30 mg/dL  17     Creatinine Latest Ref Range: 0.52 - 1.04 mg/dL  0.92     GFR Estimate Latest Ref Range: >60 mL/min/1.7m2  66     GFR Estimate If Black Latest Ref Range: >60 mL/min/1.7m2  80     Calcium Latest Ref Range: 8.5 - 10.1 mg/dL  8.7     Anion Gap Latest Ref Range: 3 - 14 mmol/L  8     CRP Inflammation Latest Ref Range: 0.0 - 8.0 mg/L  71.7 (H)     Procalcitonin Latest Units: ng/ml  <0.05...     Uric Acid Latest Ref Range: 2.6 - 6.0 mg/dL  9.4 (H)     Glucose Latest Ref Range: 70 - 99 mg/dL  200 (H)     WBC Latest Ref Range: 4.0 - 11.0 10e9/L  13.6 (H)     Hemoglobin Latest Ref Range: 11.7 - 15.7 g/dL  12.7     Hematocrit Latest Ref Range: 35.0 - 47.0 %  39.6     Platelet Count Latest Ref Range: 150 - 450 10e9/L  337     RBC Count Latest Ref Range: 3.8 - 5.2 10e12/L  4.35     MCV Latest Ref Range: 78 - 100 fl  91     MCH Latest Ref Range: 26.5 - 33.0 pg  29.2     MCHC Latest Ref Range: 31.5 - 36.5 g/dL  32.1     RDW Latest Ref Range: 10.0 - 15.0 %   15.4 (H)     INR Latest Ref Range: 0.86 - 1.14  2.83 (H) 2.44 (H)  2.19 (H)   XR FOOT LT G/E 3 VW Unknown   Rpt      Current Outpatient Prescriptions   Medication     torsemide (DEMADEX) 100 MG tablet     topiramate (TOPAMAX) 25 MG tablet     gabapentin (NEURONTIN) 300 MG capsule     blood glucose monitoring (ACCU-CHEK SMARTVIEW) test strip     blood glucose monitoring (ACCU-CHEK FASTCLIX) lancets     colchicine 0.6 MG tablet     hydrALAZINE (APRESOLINE) 25 MG tablet     warfarin (COUMADIN) 5 MG tablet     potassium chloride SA (POTASSIUM CHLORIDE) 20 MEQ CR tablet     insulin pen needle (BD RIVER U/F) 32G X 4 MM     insulin glargine U-300 (TOUJEO) 300 UNIT/ML injection     buPROPion (WELLBUTRIN SR) 100 MG 12 hr tablet     diclofenac (VOLTAREN) 1 % GEL topical gel     clotrimazole (LOTRIMIN) 1 % cream     ciclopirox 8 % SOLN     Efinaconazole 10 % SOLN     acetaminophen (TYLENOL) 325 MG tablet     diazepam (VALIUM) 5 MG tablet     NOVOLOG FLEXPEN 100 UNIT/ML soln     polyethylene glycol (MIRALAX/GLYCOLAX) powder     levothyroxine (SYNTHROID/LEVOTHROID) 200 MCG tablet     morphine (MS CONTIN) 15 MG 12 hr tablet     order for DME     liraglutide (VICTOZA PEN) 18 MG/3ML soln     metoprolol (TOPROL-XL) 50 MG 24 hr tablet     order for DME     capsaicin (ZOSTRIX) 0.025 % CREA     order for DME     nicotine ([employer] NICOTINE) 14 MG/24HR patch 2h hr     nystatin (MYCOSTATIN) cream     spironolactone (ALDACTONE) 50 MG tablet     oxyCODONE-acetaminophen (PERCOCET) 5-325 MG per tablet     DULoxetine (CYMBALTA) 20 MG capsule     senna (SENOKOT) 8.6 MG tablet     bisacodyl (DULCOLAX) 10 MG suppository     Blood Glucose Monitoring Suppl (ACCU-CHEK RON PLUS) W/DEVICE KIT     tiotropium (SPIRIVA HANDIHALER) 18 MCG inhalation capsule     Respiratory Therapy Supplies (NEBULIZER COMPRESSOR) KIT     ORDER FOR DME     albuterol (PROAIR HFA, PROVENTIL HFA, VENTOLIN HFA) 108 (90 BASE) MCG/ACT inhaler     No current facility-administered  medications for this visit.      Blood pressure appears to be adequately controlled    Plan:  1. Continue to follow with primary care regarding weight loss, blood pressure control and sleep disordered breathing..  2. Laboatories: BNP, CRP, CMP  3. The crux of the patient's problem/s is her inability to lose weight.  She is also irregular in her appointments.    4. Water aerobics has been tolerated in the past.    5. Should be seeing ANP/primary care or bariatrics in rotation on a monthly basis in order to facilitate weight loss to qualify for bariatric surgery.   6. Patient noted to very high CRP that seems to be higher than explained by weight.  She will have rechecked today.      Sincerely,     Percy Alcala MD

## 2017-12-12 NOTE — MR AVS SNAPSHOT
MRN:[id number]                      After Visit Summary   12/12/2017    Pricilla Brown    MRN: [id number]           Visit Information        Provider Department      12/12/2017 10:00 AM Paulette Ruiz RD [facility] Surgical Weight Management        Your next 10 appointments already scheduled     Dec 26, 2017  8:00 AM CST   RETURN GENERAL with Tom Guardado MD   [facility] ([facility])    [address]  [address]  [facility] [address]   [phone]            Jan 05, 2018 11:30 AM CST   (Arrive by 11:15 AM)   RETURN DIABETES with Isela Archibald PA-C   [facility] Endocrinology ([facility])    [address] [place] [address]  [address] [facility] [id number] [phone]            Jan 16, 2018 11:45 AM CST   (Arrive by 11:30 AM)   Return Visit with Silva Damon MD   [facility] Medical Weight Management ([facility])    [address] [place] [address]  [address] [facility] [address] [phone]            Mar 20, 2018  1:00 PM CDT   Return Sleep Patient with YOCASTA Horan [facility] [place], Sleep Study ([facility])    [address] [place] [id number] [phone]            May 29, 2018  8:30 AM CDT   (Arrive by 8:15 AM)   RETURN HEART FAILURE with Percy Alcala MD   [facility] Heart Care ([facility])    [address] [place] [id number] [phone]              Care Instructions    GOALS:  Relating To Eating:  -Plan A:  Follow the Modified Liquid Diet for weight loss:  Breakfast: Protein Shake (Premier Protein)  Lunch: 3 oz lean protein + non-starchy vegetables  Supper: Protein Shake (Premier Protein)  Snack: non-starchy vegetables (no calorie-containing dips/condiments)  Beverages: at least 48-64 oz water between meals daily    *Protein Shake Criteria:  ~200 Calories, at least 20 grams of protein, and less than 10 grams of sugar     -Plan B (if plan A is backfiring on you): Track food intake prior to eating on [email or web address] with a target of no more than 1,800 Calories for 2 lbs/week weight loss, 2400 mg sodium or less/day would be great.   *Have 1 protein meal replacement shake daily (Premier Protein).   *Eat as much non-starchy vegetables as you need to fill you up (no calorie-containing dips/condiments).    (non-starchy veg: green leafy vegetables, cucumbers, broccoli, cauliflower, green string beans)   *Have 1 fruit daily.   *Drink at least 64 oz water between meals daily    -Eat slowly (20-30 minutes per meal), chewing foods well (25 chews per bite/applesauce consistency)    -Increase exercise as able.  (Pool therapy, walking, etc)    -Continue to work on quitting smoking.         PRE-OPERATIVE WEIGHT LOSS SURGERY TASKLIST (May 2016)  Call Sylvia at [phone] for any questions regarding tasks on this list  Tentative surgery: Sleeve  Approximate Month and Year: Nov 2016    Surgeon: Che German Insurance Coverage: Medicare  Exclusions: None  __x__Seminar viewed.   __x__Research Consent Form signed.    __x__Registered on [employer].    __x__Received Decision Making Handout to read.   __x__Received information about Support Group, 1st Wednesday of the month at         the [facility], 6:30p - 8:00pm.  ____Letter of support from primary care provider.  ____Labs to be drawn by primary care provider:            Complete Blood Count (CBC), Comprehensive metabolic panel (CMP),                  Parathormone (PTH) and Vitamin D level  ____Lose 46 lbs prior to surgery from today`s consult weight of 462 lbs   ____Exercise goal: 20 mins., 5 days a week; increase as tolerated.   _x__Dietician visit at initial consult           ____Dietician visit month 2   ____Dietician visit month 3   ____Dietician visit month 4                       ____Dietician visit month 5    ____Dietician visit month 6  ____Psychologist evaluation   ____Sleep Medicine Provider clearance  (May require a sleep study)  ____Cardiologist clearance.  ____Medical Weight Management. Continue with Dr Ascencio  ____Hematology clearance due to hx of PE in 2012.  Unknown cause.  ____Cotinine level  _____See your surgeon Dr Bolton in 1 month to discuss surgical candidacy and weight loss requirement.  _____After your visit with the surgeon, call Doug at [phone] to finalize the surgery date and schedule your final appointments to be 3 weeks before your surgery date.   _____A pre-operative history and physical  to be done within 30 days of the surgery can be done at the pre-assessment clinic.   FINAL APPOINTMENTS  _____Weigh-in/Nurse Visit at Clinic 1E, 3 weeks before surgery.                         Plan to be at your goal weight for this visit.    _____Pre-operative Weight Loss Surgery Class, 3 weeks before surgery.   _____Pre-assessment Clinic, to be done 3 weeks before surgery. Meet with the anesthesia team.     DISCLAIMER: Based on the evaluation results, the bariatric team decides whether and which bariatric surgery is the best option for you. Sometimes, even if you meet all of the pre-operative criteria, the bariatric team may still determine that in their medical judgement surgery is not recommended because of the risks to you.     CONTACT INFORMATION  ___If questions prior to surgery, call Sylvia Rosas RN at [phone].   ___If questions about insurance or scheduling surgery, call Doug at                       [phone].  ___If questions after surgery and to schedule 1E clinic appts, please call our Call                     Center at [phone].  ___Fax: [phone] (preoperative documents, FMLA or return to work forms).                 [employer] Information     [employer] gives you secure access to your electronic health record. If you see a primary care provider, you can also send messages to your  care team and make appointments. If you have questions, please call your primary care clinic.  If you do not have a primary care provider, please call [phone] and they will assist you.      [employer] is an electronic gateway that provides easy, online access to your medical records. With [employer], you can request a clinic appointment, read your test results, renew a prescription or communicate with your care team.     To access your existing account, please contact your [facility] or call [phone] for assistance.        Care EveryWhere ID     This is your Care EveryWhere ID. This could be used by other organizations to access your [place] medical records  [id number]        Equal Access to Services     KIERSTEN DRIVER : Maryam Woodward, malinda campos, ellen shah, bola walker. So [facility] [phone].    ATENCIÓN: Si habla español, tiene a de la rosa disposición servicios gratuitos de asistencia lingüística. Llame al [phone].    We comply with applicable federal civil rights laws and [place] laws. We do not discriminate on the basis of race, color, national origin, age, disability, sex, sexual orientation, or gender identity.

## 2017-12-12 NOTE — PROGRESS NOTES
ANTICOAGULATION FOLLOW-UP CLINIC VISIT    Patient Name:  Pricilla Brown  Date:  12/12/2017  Contact Type:  Telephone    SUBJECTIVE:     Patient Findings     Positives No Problem Findings           OBJECTIVE    INR   Date Value Ref Range Status   12/12/2017 2.09 (H) 0.86 - 1.14 Final     Chromogenic Factor 10   Date Value Ref Range Status   04/08/2017 19 (L) 70 - 130 % Final     Comment:     Therapeutic Range:  A Chromogenic Factor 10 level of approximately 20-40%   inversely correlates with an INR of 2-3 for patients receiving Warfarin.   Chromogenic Factor 10 levels below 20% indicate an INR greater than 3 and   levels above 40% indicate an INR less than 2.         ASSESSMENT / PLAN  INR assessment THER    Recheck INR In: 5 WEEKS    INR Location Clinic      Anticoagulation Summary as of 12/12/2017     INR goal 2.0-2.5   Today's INR 2.09   Maintenance plan 20 mg (5 mg x 4) on Mon, Fri; 15 mg (5 mg x 3) all other days   Full instructions 20 mg on Mon, Fri; 15 mg all other days   Weekly total 115 mg   Plan last modified Velvet Asencio RN (3/15/2017)   Next INR check 1/16/2018   Priority INR   Target end date Indefinite    Indications   Atrial fibrillation (H) [I48.91] [I48.91]  Long-term (current) use of anticoagulants [Z79.01] [Z79.01]         Anticoagulation Episode Summary     INR check location Clinic Lab    Preferred lab     Send INR reminders to [facility]    Comments Contact Patient at [phone]      Anticoagulation Care Providers     Provider Role Specialty Phone number    Percy Alcala MD  Cardiology [phone]            See the Encounter Report to view Anticoagulation Flowsheet and Dosing Calendar (Go to Encounters tab in chart review, and find the Anticoagulation Therapy Visit)    Spoke with patient.    Dorene Edge RN

## 2017-12-14 DIAGNOSIS — M54.50 LUMBAGO: ICD-10-CM

## 2017-12-14 DIAGNOSIS — Z72.0 TOBACCO ABUSE: ICD-10-CM

## 2017-12-18 DIAGNOSIS — E11.65 TYPE 2 DIABETES MELLITUS WITH HYPERGLYCEMIA, WITH LONG-TERM CURRENT USE OF INSULIN (H): ICD-10-CM

## 2017-12-18 DIAGNOSIS — Z79.4 TYPE 2 DIABETES MELLITUS WITH HYPERGLYCEMIA, WITH LONG-TERM CURRENT USE OF INSULIN (H): ICD-10-CM

## 2017-12-18 RX ORDER — GABAPENTIN 300 MG/1
600 CAPSULE ORAL 2 TIMES DAILY
Qty: 120 CAPSULE | Refills: 0 | OUTPATIENT
Start: 2017-12-18

## 2017-12-18 RX ORDER — BUPROPION HYDROCHLORIDE 100 MG/1
100 TABLET, EXTENDED RELEASE ORAL 2 TIMES DAILY
Qty: 180 TABLET | Refills: 1 | Status: SHIPPED | OUTPATIENT
Start: 2017-12-18 | End: 2018-12-30

## 2017-12-18 NOTE — TELEPHONE ENCOUNTER
wellbutrin   Last Written Prescription Date:  6/14/17  Last Fill Quantity: 180,   # refills: 1  Last Office Visit : 5/31/17  Future Office visit:  NONE    gabapentin    Last Written Prescription Date:  10/17/17  Last Fill Quantity: 120,   # refills: 0  Routing refill request to provider for review/approval because:  Drug not on refill protocol or controlled substance

## 2017-12-19 DIAGNOSIS — M54.50 LUMBAGO: ICD-10-CM

## 2017-12-22 NOTE — TELEPHONE ENCOUNTER
gabapentin (NEURONTIN)  Last Written Prescription Date:  10/17/17Last Fill Quantity: 120,   # refills: 0  Last Office Visit : 5/31/17  Future Office visit:  None    Routing because:  Controlled  ??  Hx no show, cancellation.  letter sent.  rf?

## 2017-12-26 ENCOUNTER — CARE COORDINATION (OUTPATIENT)
Dept: CARDIOLOGY | Facility: CLINIC | Age: 42
End: 2017-12-26

## 2017-12-26 NOTE — PROGRESS NOTES
Patient calling in concerned with her latest labs, specifically CRP. She has reviewed them on [facility] and would like to speak with someone on Dr. Alcala's team as to the next steps.     Will route to Dr. Alcala's RNCC coverage for follow up.

## 2017-12-29 ENCOUNTER — CARE COORDINATION (OUTPATIENT)
Dept: SURGERY | Facility: CLINIC | Age: 42
End: 2017-12-29
Payer: MEDICARE

## 2017-12-29 DIAGNOSIS — E55.9 VITAMIN D DEFICIENCY: ICD-10-CM

## 2017-12-29 DIAGNOSIS — Z72.0 TOBACCO ABUSE: ICD-10-CM

## 2017-12-29 DIAGNOSIS — Z91.199 PATIENT'S NONCOMPLIANCE WITH OTHER MEDICAL TREATMENT AND REGIMEN: ICD-10-CM

## 2017-12-29 DIAGNOSIS — E66.01 MORBID OBESITY (H): Primary | ICD-10-CM

## 2017-12-29 NOTE — PROGRESS NOTES
Tasklist updated and reviewed with client and copy sent to client via [employer]    Bariatric Task List  Status:  Is patient a candidate for bariatric surgery?:  Yes -     Cleared to schedule surgeon consult?:    -     Status:  surgery evaluation in process -     Surgeon: Dr Pedro Bolton -     Tentative surgery month/year: April 2018 -        Insurance: Insurance:  Medicare -        Patient Info: Initial Weight:  462 -     Date of Initial Weight/Height:  5/4/2016 -     Goal Weight (lbs):  416 -     Required Weight Loss:  41 -     Surgery Type:  sleeve gastrectomy -        Dietician Visits: Structured weight loss required by insurance?:  Yes -     Dietician Visit 1:  Completed - 1/3/17, 2/13/17, 3/17/17, 4/25/17, 5/16/17, 8/7/17,   Dietician Visit 2:  Completed - 12/12/17   Dietician Visit 3:  Needed -     Dietician Visit additional:  Needed -     Clearance from dietician to see surgeon?:    -     Dietician Notes:  Continue to meet monthly until at goal weight.  -        Psychological Evaluation: Psych eval:  Needed -        Lab Work: Complete Blood Count:  Completed - 5/31/17 in epic. BKS   Comprehensive Metabolic Panel:  Needed -     Vitamin D:  Needed -     Hgb A1c:  Completed - 4/7/17=9.0 BKS   PTH:  Needed -     Nicotine Testing:  Needed - To check 2-4 weeks after stopping all tobacco use.      Consults/ Clearance: Sleep Medicine:  Needed - 3/13/17 Saw Alecia Ramirez. Next appt 3/20/18- to check if it can be done in Feb. or earlier in March.   Cardiac:  Completed - 11/29/16 Referral Dr Alcala. - 12/12/17 Supports surgery.   Pain: Needed - Pain Clinic clearance - hx chronic pain.   Hematology:  Needed - PE 2012 unknown cause.   Medical Weight Management:   -  1/16/18 appt with Dr Silva Damon. To be ongoing.      Testing: Other:  Completed - Sleep study 3/14/13=sev JYOTI      PCP: PCP letter of support:  Completed - 7/11/16 Jamilah Bernal MD - PCP ltr      Smoking: Quit tobacco use (3 months smoke free)?:   "Needed -     Quit date:    -  TBD      Patient Education:  Information Session:  Completed -     Given \"Making your decision\" handout?:  Yes -     Given support group information?:  Yes -     Attended support group?:    -     Support plan in place?:  Completed -     Research consents signed?:  Yes -        Final Tasks:  Before surgery online class:  Needed -     Before surgery online class website link:  [email or web address]   After surgery online class:  Needed -     After surgery online class website link:  [email or web address]   Nurse visit for weigh-in and information:  Needed -     Pre-assessment clinic visit with anesthesia team for H&P:  Needed -     Final labs (Hgb, plt, T&S, UA):  Needed -        Notes:   -  Has had 2 no shows with Paulette Souza RD (10/31/17, 11/21/17) and 2 no shows with Dr Ascencio (10/31/17,  11/28/17). Not a candidate for weight loss surgery if another no show. Please call the Call Center at [phone] if not able to make an appointment.         "

## 2018-01-22 ENCOUNTER — ALLIED HEALTH/NURSE VISIT (OUTPATIENT)
Dept: SURGERY | Facility: CLINIC | Age: 43
End: 2018-01-22
Payer: MEDICARE

## 2018-01-22 VITALS — WEIGHT: 293 LBS | BODY MASS INDEX: 71.9 KG/M2

## 2018-01-22 DIAGNOSIS — E11.9 TYPE 2 DIABETES MELLITUS (H): Primary | ICD-10-CM

## 2018-01-22 RX ORDER — BISMUTH SUBSALICYLATE 262MG/15ML
SUSPENSION, ORAL (FINAL DOSE FORM) ORAL
Qty: 360 EACH | Refills: 3 | Status: SHIPPED | OUTPATIENT
Start: 2018-01-22 | End: 2018-02-07

## 2018-01-22 NOTE — PATIENT INSTRUCTIONS
GOALS:  Relating To Eating:  -Plan A:  Follow the Modified Liquid Diet for weight loss:  Breakfast: Protein Shake (Premier Protein)  Lunch: 3 oz lean protein + non-starchy vegetables  Supper: Protein Shake (Premier Protein)  Snack: non-starchy vegetables (no calorie-containing dips/condiments)  Beverages: at least 48-64 oz water between meals daily    *Protein Shake Criteria: ~200 Calories, at least 20 grams of protein, and less than 10 grams of sugar     -Plan B (if plan A is backfiring on you): Track food intake prior to eating on MyFitnessPal.com with a target of no more than 1,800 Calories for 2 lbs/week weight loss, 2400 mg sodium or less/day would be great.   *Have 1 protein meal replacement shake daily (Premier Protein).    *Eat as much non-starchy vegetables as you need to fill you up (no calorie-containing dips/condiments).    (non-starchy veg: green leafy vegetables, cucumbers, broccoli, cauliflower, green string beans)   *Have 1 fruit daily.   *Drink at least 64 oz water between meals daily    -Eat slowly (20-30 minutes per meal), chewing foods well (25 chews per bite/applesauce consistency)    -Increase exercise as able.  (Pool therapy, walking, etc)    -Continue to work on quitting smoking.

## 2018-01-22 NOTE — MR AVS SNAPSHOT
MRN:[id number]                      After Visit Summary   1/22/2018    Pricilla Brown    MRN: [id number]           Visit Information        Provider Department      1/22/2018 11:30 AM Paulette Ruiz RD [facility]        Your next 10 appointments already scheduled     Feb 07, 2018 11:00 AM CST   (Arrive by 10:45 AM)   RETURN DIABETES with Isela Archibald PA-C   [facility] ([facility])    [address]  3rd Floor  [facility] [id number] [phone]            Feb 13, 2018  1:30 PM CST   LAB with  LAB    [facility] ([facility])    [address] [place] [address]  [address] [facility] [id number] [phone]           Please do not eat 10-12 hours before your appointment if you are coming in fasting for labs on lipids, cholesterol, or glucose (sugar). This does not apply to pregnant women. Water, hot tea and black coffee (with nothing added) are okay. Do not drink other fluids, diet soda or chew gum.            Feb 13, 2018  2:00 PM CST   (Arrive by 1:45 PM)   Return Visit with Silva Damon MD   [facility] ([facility])    [address] [place] [address]  [address] [facility] [id number] [phone]            Mar 20, 2018  1:00 PM CDT   Return Sleep Patient with YOCASTA Horan [facility] [place], Sleep Study ([facility])    [address] [place] [id number] [phone]            May 29, 2018  8:30 AM CDT   (Arrive by 8:15 AM)   RETURN HEART FAILURE with Percy Alcala MD   [facility] ([facility])    [address] [place] [id number] [phone]              Care Instructions      GOALS:  Relating To Eating:  -Plan A:  Follow the Modified Liquid Diet for weight loss:  Breakfast: Protein Shake  (Premier Protein)  Lunch: 3 oz lean protein + non-starchy vegetables  Supper: Protein Shake (Premier Protein)  Snack: non-starchy vegetables (no calorie-containing dips/condiments)  Beverages: at least 48-64 oz water between meals daily    *Protein Shake Criteria: ~200 Calories, at least 20 grams of protein, and less than 10 grams of sugar     -Plan B (if plan A is backfiring on you): Track food intake prior to eating on [email or web address] with a target of no more than 1,800 Calories for 2 lbs/week weight loss, 2400 mg sodium or less/day would be great.   *Have 1 protein meal replacement shake daily (Premier Protein).    *Eat as much non-starchy vegetables as you need to fill you up (no calorie-containing dips/condiments).    (non-starchy veg: green leafy vegetables, cucumbers, broccoli, cauliflower, green string beans)   *Have 1 fruit daily.   *Drink at least 64 oz water between meals daily    -Eat slowly (20-30 minutes per meal), chewing foods well (25 chews per bite/applesauce consistency)    -Increase exercise as able.  (Pool therapy, walking, etc)    -Continue to work on quitting smoking.          TopLog Information     [employer]Log gives you secure access to your electronic health record. If you see a primary care provider, you can also send messages to your care team and make appointments. If you have questions, please call your primary care clinic.  If you do not have a primary care provider, please call [phone] and they will assist you.      TopLog is an electronic gateway that provides easy, online access to your medical records. With [employer], you can request a clinic appointment, read your test results, renew a prescription or communicate with your care team.     To access your existing account, please contact your [facility] or call [phone] for assistance.        Care EveryWhere ID     This is your Care EveryWhere ID. This could be used by other organizations to access  your [place] medical records  [id number]        Equal Access to Services     KIERSTEN DRIVER : Maryam Woodward, malinda campos, ellen shah, bola walker. So [facility] [phone].    ATENCIÓN: Si habla español, tiene a de la rosa disposición servicios gratuitos de asistencia lingüística. Llame al [phone].    We comply with applicable federal civil rights laws and [place] laws. We do not discriminate on the basis of race, color, national origin, age, disability, sex, sexual orientation, or gender identity.

## 2018-01-22 NOTE — PROGRESS NOTES
"  Bariatric Nutrition Consultation Note    Reason For Visit: Nutrition Reassessment    Pricilla Brown is a 42 year-old (type 2 DM on insulin sliding scale) presenting today for bariatric nutrition consult.  Pt is interested in laparoscopic sleeve gastrectomy.  Pt has met all required RD visits prior to surgery.  Pt was referred by NANETTE Kerr and Dr. Silva Damon.    Coordination Notes:,   (1/22/18): Pt states sleep study is scheduled in March. Pt reports she is waiting to receive information on where to schedule psych eval. Writer messaged nurse coordinator Sylvia Rosas.       ANTHROPOMETRICS:  Ht: 67\"  Initial Wt: 462.6 lbs with BMI of 72.6  Current Wt: 459.1 lbs (+0.2 lbs from last month; -3.5 lbs from initial weight)     Required weight loss goal pre-op: -46 lbs from initial consult weight (goal weight 416.6 lbs or less before surgery)    NUTRITION HISTORY:  Food Allergies/Intolerances: none known  Cravings: sweets, chips, pop (diet)  Triggers/cues causing extra eating: boredom (currently on disability, not working)  *Pt is on topiramate, but feels it no longer is working.  She plans to follow-up with Dr. Silva Damon soon.     Progress with Previous Goals:  Relating To Eating:  -Plan A:  Follow the Modified Liquid Diet for weight loss: - Not met consistently, continues. Pt has been primarily been following plan B (see below).   Breakfast: Protein Shake (Premier Protein)  Lunch: 3 oz lean protein + non-starchy vegetables  Supper: Protein Shake (Premier Protein)  Snack: non-starchy vegetables (no calorie-containing dips/condiments)  Beverages: at least 48-64 oz water between meals daily    *Protein Shake Criteria: ~200 Calories, at least 20 grams of protein, and less than 10 grams of sugar     -Plan B (if plan A is backfiring on you): Track food intake prior to eating on [email or web address] with a target of no more than 1,800 Calories for 2 lbs/week weight loss, 2400 mg sodium or less/day " "would be great.  - Met and continues. Pt has been drinking 1 premier protein shake at breakfast and \"plate method\" for lunch and dinner. Pt has been snacking twice daily (e.g. Cucumbers -or- not buttered, lightly salted popcorn) and has cut out soda/sweets successfully. She reports feeling very hungry all the time, topiramate doesn't help lately.    *Have 1 protein meal replacement shake daily (Premier Protein).    *Eat as much non-starchy vegetables as you need to fill you up (no calorie-containing dips/condiments).    (non-starchy veg: green leafy vegetables, cucumbers, broccoli, cauliflower, green string beans)   *Have 1 fruit daily.    *Drink at least 64 oz water between meals daily    -Eat slowly (20-30 minutes per meal), chewing foods well (25 chews per bite/applesauce consistency) - Met and continues.     -Increase exercise as able.  (Pool therapy, walking, etc) - Met and continues. Pt is making an effort to walk around the house more frequently.     -Continue to work on quitting smoking.- Not met, continues. Continues to smoke 3-5 cigarettes/day.      NUTRITION DIAGNOSIS:  Obesity r/t long history of self-monitoring deficit and excessive energy intake aeb BMI >30.- continues    INTERVENTION:  Intervention Provided/Education Provided:  Reviewed previous goals, encouraging re-attempting the modified liquid diet and to follow-up with medical weight management.  Encouraged exercise.  Discussed ways to optimize satiety (focusing on lean protein and non-starchy veg at solid meals, using non-starchy veg as fillers between meals and/or option from 100 calorie snack list up to twice daily). Emphasized the importance of mindful eating.  Gave encouragement and support.  Provided Pt with list of goals and task list.      Patient Understanding: good  Expected Compliance: good      GOALS:  Relating To Eating:  -Plan A:  Follow the Modified Liquid Diet for weight loss:  Breakfast: Protein Shake (Premier Protein)  Lunch: 3 " oz lean protein + non-starchy vegetables  Supper: Protein Shake (Premier Protein)  Snack: non-starchy vegetables (no calorie-containing dips/condiments)  Beverages: at least 48-64 oz water between meals daily    *Protein Shake Criteria: ~200 Calories, at least 20 grams of protein, and less than 10 grams of sugar     -Plan B (if plan A is backfiring on you): Track food intake prior to eating on [email or web address] with a target of no more than 1,800 Calories for 2 lbs/week weight loss, 2400 mg sodium or less/day would be great.   *Have 1 protein meal replacement shake daily (Premier Protein).    *Eat as much non-starchy vegetables as you need to fill you up (no calorie-containing dips/condiments).    (non-starchy veg: green leafy vegetables, cucumbers, broccoli, cauliflower, green string beans)   *Have 1 fruit daily.   *Drink at least 64 oz water between meals daily    -Eat slowly (20-30 minutes per meal), chewing foods well (25 chews per bite/applesauce consistency)    -Increase exercise as able.  (Pool therapy, walking, etc)    -Continue to work on quitting smoking.     Follow-Up: follow-up with RD in 1 month.     Time spent with patient: 15 minutes     Rand David RD, LD

## 2018-01-23 ENCOUNTER — TELEPHONE (OUTPATIENT)
Dept: ENDOCRINOLOGY | Facility: CLINIC | Age: 43
End: 2018-01-23

## 2018-01-25 RX ORDER — GABAPENTIN 300 MG/1
600 CAPSULE ORAL 2 TIMES DAILY
Qty: 120 CAPSULE | Refills: 0 | OUTPATIENT
Start: 2018-01-25

## 2018-02-07 ENCOUNTER — TELEPHONE (OUTPATIENT)
Dept: ENDOCRINOLOGY | Facility: CLINIC | Age: 43
End: 2018-02-07

## 2018-02-07 ENCOUNTER — OFFICE VISIT (OUTPATIENT)
Dept: ENDOCRINOLOGY | Facility: CLINIC | Age: 43
End: 2018-02-07
Payer: MEDICARE

## 2018-02-07 ENCOUNTER — ALLIED HEALTH/NURSE VISIT (OUTPATIENT)
Dept: SURGERY | Facility: CLINIC | Age: 43
End: 2018-02-07
Payer: MEDICARE

## 2018-02-07 VITALS
OXYGEN SATURATION: 99 % | BODY MASS INDEX: 70.64 KG/M2 | HEART RATE: 101 BPM | SYSTOLIC BLOOD PRESSURE: 140 MMHG | WEIGHT: 293 LBS | DIASTOLIC BLOOD PRESSURE: 88 MMHG

## 2018-02-07 VITALS — WEIGHT: 293 LBS | BODY MASS INDEX: 70.65 KG/M2

## 2018-02-07 DIAGNOSIS — Z72.0 TOBACCO ABUSE: ICD-10-CM

## 2018-02-07 DIAGNOSIS — E55.9 VITAMIN D DEFICIENCY: ICD-10-CM

## 2018-02-07 DIAGNOSIS — E11.9 TYPE 2 DIABETES MELLITUS (H): ICD-10-CM

## 2018-02-07 DIAGNOSIS — E66.01 MORBID OBESITY (H): Primary | ICD-10-CM

## 2018-02-07 DIAGNOSIS — E11.65 TYPE 2 DIABETES MELLITUS WITH HYPERGLYCEMIA, WITH LONG-TERM CURRENT USE OF INSULIN (H): Primary | ICD-10-CM

## 2018-02-07 DIAGNOSIS — Z91.199 PATIENT'S NONCOMPLIANCE WITH OTHER MEDICAL TREATMENT AND REGIMEN: ICD-10-CM

## 2018-02-07 DIAGNOSIS — E11.9 DIABETES MELLITUS, TYPE 2 (H): ICD-10-CM

## 2018-02-07 DIAGNOSIS — E11.65 TYPE 2 DIABETES MELLITUS WITH HYPERGLYCEMIA, WITH LONG-TERM CURRENT USE OF INSULIN (H): ICD-10-CM

## 2018-02-07 DIAGNOSIS — E11.9 DIABETES MELLITUS, TYPE 2 (H): Primary | ICD-10-CM

## 2018-02-07 DIAGNOSIS — Z79.4 TYPE 2 DIABETES MELLITUS WITH HYPERGLYCEMIA, WITH LONG-TERM CURRENT USE OF INSULIN (H): ICD-10-CM

## 2018-02-07 DIAGNOSIS — E03.8 OTHER SPECIFIED HYPOTHYROIDISM: Primary | ICD-10-CM

## 2018-02-07 DIAGNOSIS — Z79.4 TYPE 2 DIABETES MELLITUS WITH HYPERGLYCEMIA, WITH LONG-TERM CURRENT USE OF INSULIN (H): Primary | ICD-10-CM

## 2018-02-07 DIAGNOSIS — E66.01 MORBID OBESITY (H): ICD-10-CM

## 2018-02-07 DIAGNOSIS — Z23 NEED FOR PROPHYLACTIC VACCINATION AND INOCULATION AGAINST INFLUENZA: ICD-10-CM

## 2018-02-07 LAB
ALBUMIN SERPL-MCNC: 3.1 G/DL (ref 3.4–5)
ALP SERPL-CCNC: 87 U/L (ref 40–150)
ALT SERPL W P-5'-P-CCNC: 18 U/L (ref 0–50)
ANION GAP SERPL CALCULATED.3IONS-SCNC: 7 MMOL/L (ref 3–14)
AST SERPL W P-5'-P-CCNC: 9 U/L (ref 0–45)
BILIRUB SERPL-MCNC: 0.3 MG/DL (ref 0.2–1.3)
BUN SERPL-MCNC: 17 MG/DL (ref 7–30)
CALCIUM SERPL-MCNC: 8.7 MG/DL (ref 8.5–10.1)
CHLORIDE SERPL-SCNC: 100 MMOL/L (ref 94–109)
CO2 SERPL-SCNC: 30 MMOL/L (ref 20–32)
CREAT SERPL-MCNC: 1.19 MG/DL (ref 0.52–1.04)
DEPRECATED CALCIDIOL+CALCIFEROL SERPL-MC: 7 UG/L (ref 20–75)
GFR SERPL CREATININE-BSD FRML MDRD: 49 ML/MIN/1.7M2
GLUCOSE SERPL-MCNC: 191 MG/DL (ref 70–99)
POTASSIUM SERPL-SCNC: 3.4 MMOL/L (ref 3.4–5.3)
PROT SERPL-MCNC: 8.8 G/DL (ref 6.8–8.8)
PTH-INTACT SERPL-MCNC: 208 PG/ML (ref 12–72)
SODIUM SERPL-SCNC: 137 MMOL/L (ref 133–144)
T4 FREE SERPL-MCNC: 0.91 NG/DL (ref 0.76–1.46)
TSH SERPL DL<=0.005 MIU/L-ACNC: 7.15 MU/L (ref 0.4–4)

## 2018-02-07 PROCEDURE — 83970 ASSAY OF PARATHORMONE: CPT | Performed by: PHYSICIAN ASSISTANT

## 2018-02-07 PROCEDURE — 82306 VITAMIN D 25 HYDROXY: CPT | Performed by: PHYSICIAN ASSISTANT

## 2018-02-07 RX ORDER — INSULIN ASPART 100 [IU]/ML
INJECTION, SOLUTION INTRAVENOUS; SUBCUTANEOUS
Qty: 60 ML | Refills: 3 | Status: SHIPPED | OUTPATIENT
Start: 2018-02-07 | End: 2018-04-19

## 2018-02-07 RX ORDER — BISMUTH SUBSALICYLATE 262MG/15ML
SUSPENSION, ORAL (FINAL DOSE FORM) ORAL
Qty: 360 EACH | Refills: 3 | Status: SHIPPED | OUTPATIENT
Start: 2018-02-07 | End: 2018-05-28

## 2018-02-07 RX ORDER — GABAPENTIN 300 MG/1
600 CAPSULE ORAL 2 TIMES DAILY
Qty: 120 CAPSULE | Refills: 0 | Status: SHIPPED | OUTPATIENT
Start: 2018-02-07 | End: 2018-04-02

## 2018-02-07 RX ORDER — LEVOTHYROXINE SODIUM 200 UG/1
TABLET ORAL
Qty: 102 TABLET | Refills: 3 | Status: SHIPPED | OUTPATIENT
Start: 2018-02-07 | End: 2018-12-31

## 2018-02-07 ASSESSMENT — PAIN SCALES - GENERAL: PAINLEVEL: SEVERE PAIN (6)

## 2018-02-07 NOTE — TELEPHONE ENCOUNTER
[facility] [place] [facility] pharmacy asking if Trulicity is being added to Victoza Tx. Trulicity Rx'd as maintenance dose. This amount typically Rx'd w/ Victoza. Otherwise, Trulicity typically first Rx'd as loading dose.

## 2018-02-07 NOTE — MR AVS SNAPSHOT
After Visit Summary   2/7/2018    Pricilla Brown    MRN: [id number]           Patient Information     Date Of Birth          1975        Visit Information        Provider Department      2/7/2018 11:00 AM Isela Archibald PA-C [facility] Endocrinology        Today's Diagnoses     Type 2 diabetes mellitus with hyperglycemia, with long-term current use of insulin (H)    -  1      Care Instructions    1.  Discontinue Victoza.  2. Start Trulicity 1.5 mg inject ONCE A WEEK only.  3.  Increase Toujeo 170 units inject each am only.  4.  Increase Novolog 65 units with meals plus correction scale.  5.  See eye doctor.  6.  Check your fasting blood sugar each am and check your blood sugar prelunch and predinner DAILY.  7.  Labs today.  8.  See me in 3 weeks.  Isela Archibald PA-C          Follow-ups after your visit        Your next 10 appointments already scheduled     Feb 13, 2018  1:30 PM CST   LAB with  LAB   [facility] Lab ([facility])    [address] [place] [id number] [phone]           Please do not eat 10-12 hours before your appointment if you are coming in fasting for labs on lipids, cholesterol, or glucose (sugar). This does not apply to pregnant women. Water, hot tea and black coffee (with nothing added) are okay. Do not drink other fluids, diet soda or chew gum.            Feb 13, 2018  2:00 PM CST   (Arrive by 1:45 PM)   Return Visit with Silva Damon MD   [facility] Medical Weight Management ([facility])    [address] [place] [id number]   [phone]            Feb 22, 2018 11:00 AM CST   (Arrive by 10:45 AM)   NUTRITION VISIT with Paulette Ruiz RD   [facility] Surgical Weight Management ([facility])    [address] [place] [id number]   [phone]            Feb 22, 2018 12:20 PM CST   (Arrive by 12:05 PM)    RETURN GENERAL with GIANNA Sanchez [facility] Ophthalmology ([facility] [place])    [address]  4th Floor  [facility] [id number] [phone]            Feb 28, 2018 12:30 PM CST   (Arrive by 12:15 PM)   RETURN DIABETES with SHANE Rojas [facility] Endocrinology ([facility] [place])    [address]  3rd Floor  [facility] [id number] [phone]            Mar 20, 2018  1:00 PM CDT   Return Sleep Patient with YOCASTA Horan CNP   [facility] [place] Sleep Study ([facility])    [address] [place] [id number] [phone]            May 29, 2018  8:30 AM CDT   (Arrive by 8:15 AM)   RETURN HEART FAILURE with Percy Alcala MD   [facility] Heart Care ([facility] [place])    [address]  [address] [place] [id number] [phone]              Future tests that were ordered for you today     Open Future Orders        Priority Expected Expires Ordered    Creatinine Routine  2/7/2019 2/7/2018    TSH Routine  2/7/2019 2/7/2018    Potassium Routine  2/7/2019 2/7/2018    AST Routine  2/7/2019 2/7/2018    ALT Routine  2/7/2019 2/7/2018    T4 free Routine  2/7/2019 2/7/2018            Who to contact     Please call your clinic at [phone] to:    Ask questions about your health    Make or cancel appointments    Discuss your medicines    Learn about your test results    Speak to your doctor   If you have compliments or concerns about an experience at your clinic, or if you wish to file a complaint, please contact [facility] Patient Relations at [phone] or email us at [email or web address]         Additional Information About Your Visit        MyChart Information     [employer] gives you secure access to your electronic health record. If you see a primary care provider, you can also send  messages to your care team and make appointments. If you have questions, please call your primary care clinic.  If you do not have a primary care provider, please call [phone] and they will assist you.      [employer] is an electronic gateway that provides easy, online access to your medical records. With [employer], you can request a clinic appointment, read your test results, renew a prescription or communicate with your care team.     To access your existing account, please contact your [facility] or call [phone] for assistance.        Care EveryWhere ID     This is your Care EveryWhere ID. This could be used by other organizations to access your [place] medical records  [id number]        Your Vitals Were     Pulse Pulse Oximetry BMI (Body Mass Index)             101 99% 70.64 kg/m2          Blood Pressure from Last 3 Encounters:   02/07/18 140/88   12/12/17 142/83   09/08/17 131/77    Weight from Last 3 Encounters:   02/07/18 (!) 204.6 kg (451 lb)   02/07/18 (!) 204.6 kg (451 lb 1.6 oz)   01/22/18 (!) 208.2 kg (459 lb 1 oz)              We Performed the Following     Albumin Random Urine Quantitative with Creat Ratio          Today's Medication Changes          These changes are accurate as of 2/7/18 11:57 AM.  If you have any questions, ask your nurse or doctor.               Start taking these medicines.        Dose/Directions    blood glucose lancets standard   Commonly known as:  no brand specified   Used for:  Diabetes mellitus, type 2 (H)   Started by:  Isela Archibald PA-C        Use to test blood sugar 5 times daily or as directed.   Quantity:  200 each   Refills:  11       dulaglutide 1.5 MG/0.5ML pen   Commonly known as:  TRULICITY   Used for:  Type 2 diabetes mellitus with hyperglycemia, with long-term current use of insulin (H)   Started by:  Isela Archibald PA-C        Dose:  1.5 mg   Inject 1.5 mg Subcutaneous every 7 days   Quantity:  45 mL    Refills:  3         These medicines have changed or have updated prescriptions.        Dose/Directions    * blood glucose monitoring meter device kit   This may have changed:  Another medication with the same name was added. Make sure you understand how and when to take each.   Used for:  Type II or unspecified type diabetes mellitus without mention of complication, not stated as uncontrolled   Changed by:  Isela Archibald PA-C        Use to test blood sugars 2 times daily or as directed.   Quantity:  1 kit   Refills:  0       * blood glucose monitoring meter device kit   Commonly known as:  no brand specified   This may have changed:  Another medication with the same name was added. Make sure you understand how and when to take each.   Used for:  Type 2 diabetes mellitus (H)   Changed by:  Isela Archibald PA-C        Use to test blood sugar 4 times daily  With meter/ strips/ lancets covered by insurance   Quantity:  1 kit   Refills:  0       * blood glucose monitoring meter device kit   Commonly known as:  no brand specified   This may have changed:  You were already taking a medication with the same name, and this prescription was added. Make sure you understand how and when to take each.   Used for:  Diabetes mellitus, type 2 (H)   Changed by:  Isela Archibald PA-C        Use to test blood sugar 5 times daily or as directed.   Quantity:  1 kit   Refills:  1       * blood glucose monitoring test strip   Commonly known as:  ACCU-CHEK SMARTVIEW   This may have changed:  Another medication with the same name was added. Make sure you understand how and when to take each.   Used for:  Type 2 diabetes mellitus with hyperglycemia, with long-term current use of insulin (H)   Changed by:  Isela Archibald PA-C        Use to test blood sugar 5 times daily   Quantity:  500 each   Refills:  3       * blood glucose monitoring test strip   Commonly known as:  IGLUCOSE TEST STRIPS   This may have  changed:  Another medication with the same name was added. Make sure you understand how and when to take each.   Used for:  Type 2 diabetes mellitus (H)   Changed by:  Isela Archibald PA-C        Use to test blood sugar 4 times daily  With meter/ strips lancets covered by insurance   Quantity:  360 strip   Refills:  3       * blood glucose monitoring test strip   Commonly known as:  no brand specified   This may have changed:  You were already taking a medication with the same name, and this prescription was added. Make sure you understand how and when to take each.   Used for:  Diabetes mellitus, type 2 (H)   Changed by:  Isela Archibald PA-C        Use to test blood sugars 5 times daily or as directed   Quantity:  200 strip   Refills:  11       insulin glargine U-300 300 UNIT/ML injection   Commonly known as:  TOUJEO   This may have changed:  additional instructions   Used for:  Type 2 diabetes mellitus with hyperglycemia, with long-term current use of insulin (H)   Changed by:  Isela Archibald PA-C        Inject 170 units SQ each am.   Quantity:  30 mL   Refills:  3       NovoLOG FLEXPEN 100 UNIT/ML injection   This may have changed:  additional instructions   Used for:  Type 2 diabetes mellitus with hyperglycemia, with long-term current use of insulin (H)   Generic drug:  insulin aspart   Changed by:  Isela Archibald PA-C        Inject 65 units with meals plus correction. Pt uses approx 190 units in 24 hrs.   Quantity:  60 mL   Refills:  3       * Notice:  This list has 6 medication(s) that are the same as other medications prescribed for you. Read the directions carefully, and ask your doctor or other care provider to review them with you.      Stop taking these medicines if you haven't already. Please contact your care team if you have questions.     diazepam 5 MG tablet   Commonly known as:  VALIUM   Stopped by:  Isela Archibald PA-C           liraglutide 18 MG/3ML soln    Commonly known as:  VICTOZA PEN   Stopped by:  Isela Archibald PA-C                Where to get your medicines      These medications were sent to [facility] [place], [place] - [address]  [address], [facility] [address]    Hours:  TRANSPLANT PHONE NUMBER [phone] Phone:  [phone]     blood glucose lancets standard    blood glucose monitoring meter device kit    blood glucose monitoring test strip    dulaglutide 1.5 MG/0.5ML pen    gabapentin 300 MG capsule    insulin glargine U-300 300 UNIT/ML injection    NovoLOG FLEXPEN 100 UNIT/ML injection                Primary Care Provider Office Phone # Fax #    Jamilah Bernal [phone]       [address] [address]        Equal Access to Services     KIERSTEN DRIVER : Hadii miki altman hadasho Somauricioali, waaxda luqadaha, qaybta kaalmada adeegyada, bola nievesin haystephen walker. So [facility] [phone].    ATENCIÓN: Si habla español, tiene a de la rosa disposición servicios gratuitos de asistencia lingüística. Zara al [phone].    We comply with applicable federal civil rights laws and [place] laws. We do not discriminate on the basis of race, color, national origin, age, disability, sex, sexual orientation, or gender identity.            Thank you!     Thank you for choosing [facility] ENDOCRINOLOGY  for your care. Our goal is always to provide you with excellent care. Hearing back from our patients is one way we can continue to improve our services. Please take a few minutes to complete the written survey that you may receive in the mail after your visit with us. Thank you!             Your Updated Medication List - Protect others around you: Learn how to safely use, store and throw away your medicines at www.disposemymeds.org.          This list is accurate as of 2/7/18 11:57 AM.  Always use your most recent med list.                   Brand Name  Dispense Instructions for use Diagnosis    acetaminophen 325 MG tablet    TYLENOL    180 tablet    Take 2 tablets (650 mg) by mouth 3 times daily as needed for mild pain or fever (total acetaminophen dose should not exceed 3000 mg per day)    Neuropathic pain of lower extremity, unspecified laterality       albuterol 108 (90 BASE) MCG/ACT Inhaler    PROAIR HFA/PROVENTIL HFA/VENTOLIN HFA    1 Inhaler    Inhale 2 puffs into the lungs every 6 hours as needed.    Reactive airway disease       bisacodyl 10 MG Suppository    DULCOLAX    6 suppository    Place 1 suppository (10 mg) rectally daily as needed for constipation    Constipation       blood glucose lancets standard    no brand specified    200 each    Use to test blood sugar 5 times daily or as directed.    Diabetes mellitus, type 2 (H)       * blood glucose monitoring lancets     500 each    Use to test blood sugar 5 times daily or as directed.    Type 2 diabetes mellitus with hyperglycemia, with long-term current use of insulin (H)       * blood glucose monitoring lancets     360 each    Use to test blood sugar 4 times daily  With meter/ strips/ lancets covered by insurance    Type 2 diabetes mellitus (H)       * blood glucose monitoring meter device kit     1 kit    Use to test blood sugars 2 times daily or as directed.    Type II or unspecified type diabetes mellitus without mention of complication, not stated as uncontrolled       * blood glucose monitoring meter device kit    no brand specified    1 kit    Use to test blood sugar 4 times daily  With meter/ strips/ lancets covered by insurance    Type 2 diabetes mellitus (H)       * blood glucose monitoring meter device kit    no brand specified    1 kit    Use to test blood sugar 5 times daily or as directed.    Diabetes mellitus, type 2 (H)       * blood glucose monitoring test strip    ACCU-CHEK SMARTVIEW    500 each    Use to test blood sugar 5 times daily    Type 2 diabetes mellitus with hyperglycemia,  with long-term current use of insulin (H)       * blood glucose monitoring test strip    IGLUCOSE TEST STRIPS    360 strip    Use to test blood sugar 4 times daily  With meter/ strips lancets covered by insurance    Type 2 diabetes mellitus (H)       * blood glucose monitoring test strip    no brand specified    200 strip    Use to test blood sugars 5 times daily or as directed    Diabetes mellitus, type 2 (H)       buPROPion 100 MG 12 hr tablet    WELLBUTRIN SR    180 tablet    Take 1 tablet (100 mg) by mouth 2 times daily    Tobacco abuse       capsaicin 0.025 % Crea cream    ZOSTRIX    1 Tube    Apply 1 g topically 3 times daily as needed    Dermatophytosis of nail       ciclopirox 8 % Soln     1 Bottle    Externally apply topically daily To toenails.    Dermatophytosis of nail       clotrimazole 1 % cream    LOTRIMIN    30 g    Apply topically 2 times daily    Tinea pedis of right foot       colchicine 0.6 MG tablet     12 tablet    Take 1-2 tablets (0.6-1.2 mg) by mouth daily as needed for moderate pain    Acute gouty arthritis       diclofenac 1 % Gel topical gel    VOLTAREN    100 g    Apply 4 grams to knees or 2 grams to hands four times daily using enclosed dosing card.    Left foot pain       dulaglutide 1.5 MG/0.5ML pen    TRULICITY    45 mL    Inject 1.5 mg Subcutaneous every 7 days    Type 2 diabetes mellitus with hyperglycemia, with long-term current use of insulin (H)       DULoxetine 20 MG EC capsule    CYMBALTA    60 capsule    Take 1 capsule (20 mg) by mouth 2 times daily    Depression       Efinaconazole 10 % Soln     8 mL    Externally apply topically daily To toenails.    Dermatophytosis of nail       gabapentin 300 MG capsule    NEURONTIN    120 capsule    Take 2 capsules (600 mg) by mouth 2 times daily Needs PCP appt for further refills    Type 2 diabetes mellitus with hyperglycemia, with long-term current use of insulin (H)       hydrALAZINE 25 MG tablet    APRESOLINE    450 tablet    Take 1  tab (25 mg) in am, 2 tabs (50 mg) midday, and 2 tabs (50 mg) in pm daily    Dilated cardiomyopathy (H)       insulin glargine U-300 300 UNIT/ML injection    TOUJEO    30 mL    Inject 170 units SQ each am.    Type 2 diabetes mellitus with hyperglycemia, with long-term current use of insulin (H)       insulin pen needle 32G X 4 MM    BD RIVER U/F    550 each    Use 6 daily or as directed.    Diabetes mellitus, type 2 (H)       levothyroxine 200 MCG tablet    SYNTHROID/LEVOTHROID    90 tablet    Take 1 tablet (200 mcg) by mouth daily    Hypothyroidism       metoprolol succinate 50 MG 24 hr tablet    TOPROL-XL    90 tablet    Take 0.5 tablets (25 mg) by mouth At Bedtime    Chronic diastolic heart failure (H)       morphine 15 MG 12 hr tablet    MS CONTIN     Take 15 mg by mouth daily as needed        Nebulizer Compressor Kit     1 kit    1 Device 4 times daily as needed.    COPD (chronic obstructive pulmonary disease) (H)       nicotine 14 MG/24HR 24 hr patch    [employer] NICOTINE    30 patch    Place 1 patch onto the skin every 24 hours    Tobacco abuse       NovoLOG FLEXPEN 100 UNIT/ML injection   Generic drug:  insulin aspart     60 mL    Inject 65 units with meals plus correction. Pt uses approx 190 units in 24 hrs.    Type 2 diabetes mellitus with hyperglycemia, with long-term current use of insulin (H)       nystatin cream    MYCOSTATIN    90 g    Apply topically 2 times daily To toenails    Dermatophytosis of nail       order for DME     1 each    Use your CPAP device as directed by your provider. Pressure change to min 13 max 18cwp        * order for DME     1 each    Equipment being ordered: Challenger Wide walker if available - patient needs seat, basket and brakes.    Dilated cardiomyopathy (H), Chronic systolic heart failure (H)       * order for DME     1 each    Equipment being ordered: [employer] [id number] $92  Plantar, fasciitis, LG, night splint    Dermatophytosis of nail, Plantar fasciitis of right foot,  Diabetic neuropathy with neurologic complication (H), Peroneal tendinitis, right       * order for DME     1 Units    Left foot    Left foot pain, Diabetic neuropathy with neurologic complication (H)       oxyCODONE-acetaminophen 5-325 MG per tablet    PERCOCET    60 tablet    Take 1 tablet by mouth every 8 hours as needed for moderate to severe pain (try to limit use, no further prescriptions until seen in pain clinic)    Lumbago, Bilateral low back pain with sciatica, sciatica laterality unspecified       polyethylene glycol powder    MIRALAX/GLYCOLAX          potassium chloride SA 20 MEQ CR tablet    KLOR-CON    240 tablet    Take 2 tablets (40 mEq) by mouth 4 times daily    Hypokalemia       senna 8.6 MG tablet    SENOKOT    45 tablet    Take 1 tablet by mouth 2 times daily    Constipation       spironolactone 50 MG tablet    ALDACTONE    30 tablet    Take 1 tablet (50 mg) by mouth daily        tiotropium 18 MCG capsule    SPIRIVA HANDIHALER    30 capsule    Inhale contents of one capsule daily.    COPD (chronic obstructive pulmonary disease) (H)       topiramate 25 MG tablet    TOPAMAX    180 tablet    Take 3 tablets (75 mg) by mouth 2 times daily    Morbid obesity, unspecified obesity type (H)       torsemide 100 MG tablet    DEMADEX    180 tablet    Take 1 tablet (100 mg) by mouth 2 times daily    Chronic diastolic heart failure (H)       warfarin 5 MG tablet    COUMADIN    100 tablet    Take 20 mg (four tabs) M and F and 15 mg (three tabs) all other days of the week or as directed by the Coumadin Clinic.    Long term current use of anticoagulant therapy       * Notice:  This list has 11 medication(s) that are the same as other medications prescribed for you. Read the directions carefully, and ask your doctor or other care provider to review them with you.

## 2018-02-07 NOTE — NURSING NOTE
"Chief Complaint   Patient presents with     RECHECK     DIABETES TYPE 2       Initial /88 (BP Location: Other (Comment), Patient Position: Sitting, Cuff Size: Adult Large)  Pulse 101  Wt (!) 204.6 kg (451 lb)  SpO2 99%  BMI 70.64 kg/m2 Estimated body mass index is 70.64 kg/(m^2) as calculated from the following:    Height as of 12/12/17: 1.702 m (5' 7\").    Weight as of this encounter: 204.6 kg (451 lb).  Medication Reconciliation: complete    "

## 2018-02-07 NOTE — TELEPHONE ENCOUNTER
----- Message from Isela Archibald PA-C sent at 2/7/2018  2:05 PM CST -----  Please have pt increase her Levothyroxine 200 mcg 1 tab Monday thru Saturday and 2 tabs on Sunday.  Thanks benny archibald

## 2018-02-07 NOTE — TELEPHONE ENCOUNTER
Pricilla was notified to  Increase her levothyroxine 200 mcg daily by taking 2 tablets on Sunday and 1 tablet Monday- Saturday. She gave verbal read back

## 2018-02-07 NOTE — PATIENT INSTRUCTIONS
1.  Discontinue Victoza.  2. Start Trulicity 1.5 mg inject ONCE A WEEK only.  3.  Increase Toujeo 170 units inject each am only.  4.  Increase Novolog 65 units with meals plus correction scale.  5.  See eye doctor.  6.  Check your fasting blood sugar each am and check your blood sugar prelunch and predinner DAILY.  7.  Labs today.  8.  See me in 3 weeks.  Isela Archibald PA-C

## 2018-02-07 NOTE — TELEPHONE ENCOUNTER
Pharmacy  notified  that Trulicity replaces the Victoza that was discontinued  . We do not use  2 GLP 1 at the same time.

## 2018-02-07 NOTE — PROGRESS NOTES
HPI  Pricilla Brown is a 42 year old female with type 2 diabetes mellitus here today for a follow up visit.  Pt's hx is also significant for morbid obesity, dilated cardiomyopathy with diastolic dysfunction, atrial fibrillation, hx of PE, HTN, Grave's disease and sleep apnea.  For her diabetes, pt is taking Victoza 1.8 mg SQ daily, Toujeo 160 units SQ each am and Novolog 60 units with meals, plus correction insulin.  Her A1C is 8.9 % today.  Her previous A1C was 8.8 %.  Pt's A1C was 10.7 % in 6/2016 .  Pricilla had an A1C value of 12.1 % on 3/10/2016.  Pt has no glucose meter today.  She tells me her FBS values have been in the 180-220 range and her evening blood sugars are in the 200-250 range.  No symptoms of hypoglycemia.  On ROS today, pt reports fatigue and polydipsia.  Some intermittent blurred vision.  She has SOB and is using her CPAP.  No cough today.  She has numbness and tingling in both feet and hands.  Pt denies vomiting, chest pain,  chest pain or abd pain.  She denies diarrhea, dysuria, hematuria and no foot ulcers.    ROS  Please see under HPI.    Allergies  Allergies   Allergen Reactions     Penicillins Other (See Comments)     CHILDHOOD ALLERGY     Ibuprofen Sodium Hives and Rash       Medications  Current Outpatient Prescriptions   Medication Sig Dispense Refill     insulin glargine U-300 (TOUJEO) 300 UNIT/ML injection Inject 170 units SQ each am. 30 mL 3     NOVOLOG FLEXPEN 100 UNIT/ML soln Inject 65 units with meals plus correction. Pt uses approx 190 units in 24 hrs. 60 mL 3     dulaglutide (TRULICITY) 1.5 MG/0.5ML pen Inject 1.5 mg Subcutaneous every 7 days 45 mL 3     gabapentin (NEURONTIN) 300 MG capsule Take 2 capsules (600 mg) by mouth 2 times daily Needs PCP appt for further refills 120 capsule 0     blood glucose monitoring (NO BRAND SPECIFIED) meter device kit Use to test blood sugar 5 times daily or as directed. 1 kit 1     blood glucose monitoring (NO BRAND SPECIFIED) test strip Use to  test blood sugars 5 times daily or as directed 200 strip 11     blood glucose (NO BRAND SPECIFIED) lancets standard Use to test blood sugar 5 times daily or as directed. 200 each 11     blood glucose monitoring (NO BRAND SPECIFIED) meter device kit Use to test blood sugar 4 times daily  With meter/ strips/ lancets covered by insurance 1 kit 0     blood glucose monitoring (IGLUCOSE TEST STRIPS) test strip Use to test blood sugar 4 times daily  With meter/ strips lancets covered by insurance 360 strip 3     blood glucose monitoring (ULTRA THIN 30G) lancets Use to test blood sugar 4 times daily  With meter/ strips/ lancets covered by insurance 360 each 3     buPROPion (WELLBUTRIN SR) 100 MG 12 hr tablet Take 1 tablet (100 mg) by mouth 2 times daily 180 tablet 1     blood glucose monitoring (ACCU-CHEK SMARTVIEW) test strip Use to test blood sugar 5 times daily 500 each 3     blood glucose monitoring (ACCU-CHEK FASTCLIX) lancets Use to test blood sugar 5 times daily or as directed. 500 each 3     [DISCONTINUED] insulin glargine U-300 (TOUJEO) 300 UNIT/ML injection Inject 160 units SQ each am. 30 mL 3     torsemide (DEMADEX) 100 MG tablet Take 1 tablet (100 mg) by mouth 2 times daily 180 tablet 1     topiramate (TOPAMAX) 25 MG tablet Take 3 tablets (75 mg) by mouth 2 times daily 180 tablet 0     [DISCONTINUED] gabapentin (NEURONTIN) 300 MG capsule Take 2 capsules (600 mg) by mouth 2 times daily Needs PCP appt for further refills 120 capsule 0     colchicine 0.6 MG tablet Take 1-2 tablets (0.6-1.2 mg) by mouth daily as needed for moderate pain 12 tablet 1     hydrALAZINE (APRESOLINE) 25 MG tablet Take 1 tab (25 mg) in am, 2 tabs (50 mg) midday, and 2 tabs (50 mg) in pm daily 450 tablet 3     warfarin (COUMADIN) 5 MG tablet Take 20 mg (four tabs) M and F and 15 mg (three tabs) all other days of the week or as directed by the [facility]. 100 tablet 6     potassium chloride SA (POTASSIUM CHLORIDE) 20 MEQ CR tablet Take 2  tablets (40 mEq) by mouth 4 times daily 240 tablet 3     insulin pen needle (BD RIVER U/F) 32G X 4 MM Use 6 daily or as directed. 550 each 3     diclofenac (VOLTAREN) 1 % GEL topical gel Apply 4 grams to knees or 2 grams to hands four times daily using enclosed dosing card. 100 g 1     clotrimazole (LOTRIMIN) 1 % cream Apply topically 2 times daily 30 g 1     ciclopirox 8 % SOLN Externally apply topically daily To toenails. 1 Bottle 11     Efinaconazole 10 % SOLN Externally apply topically daily To toenails. 8 mL 11     acetaminophen (TYLENOL) 325 MG tablet Take 2 tablets (650 mg) by mouth 3 times daily as needed for mild pain or fever (total acetaminophen dose should not exceed 3000 mg per day) 180 tablet 5     [DISCONTINUED] NOVOLOG FLEXPEN 100 UNIT/ML soln Inject 60 units with meals plus correction. Pt uses approx 180 units in 24 hrs. 60 mL 11     polyethylene glycol (MIRALAX/GLYCOLAX) powder        levothyroxine (SYNTHROID/LEVOTHROID) 200 MCG tablet Take 1 tablet (200 mcg) by mouth daily 90 tablet 3     morphine (MS CONTIN) 15 MG 12 hr tablet Take 15 mg by mouth daily as needed       order for DME Left foot 1 Units 0     metoprolol (TOPROL-XL) 50 MG 24 hr tablet Take 0.5 tablets (25 mg) by mouth At Bedtime 90 tablet 3     order for DME Equipment being ordered:  2437-8844 $92   Plantar, fasciitis, LG, night splint 1 each 0     capsaicin (ZOSTRIX) 0.025 % CREA Apply 1 g topically 3 times daily as needed 1 Tube 0     order for DME Equipment being ordered: Challenger Wide walker if available - patient needs seat, basket and brakes. 1 each 0     nicotine ([employer] NICOTINE) 14 MG/24HR patch 2h hr Place 1 patch onto the skin every 24 hours (Patient not taking: Reported on 8/7/2017) 30 patch 1     nystatin (MYCOSTATIN) cream Apply topically 2 times daily To toenails 90 g 6     spironolactone (ALDACTONE) 50 MG tablet Take 1 tablet (50 mg) by mouth daily 30 tablet 11     oxyCODONE-acetaminophen (PERCOCET) 5-325 MG per  tablet Take 1 tablet by mouth every 8 hours as needed for moderate to severe pain (try to limit use, no further prescriptions until seen in pain clinic) 60 tablet 0     DULoxetine (CYMBALTA) 20 MG capsule Take 1 capsule (20 mg) by mouth 2 times daily 60 capsule 0     senna (SENOKOT) 8.6 MG tablet Take 1 tablet by mouth 2 times daily 45 tablet 0     bisacodyl (DULCOLAX) 10 MG suppository Place 1 suppository (10 mg) rectally daily as needed for constipation 6 suppository 0     Blood Glucose Monitoring Suppl (ACCU-CHEK RON PLUS) W/DEVICE KIT Use to test blood sugars 2 times daily or as directed. 1 kit 0     tiotropium (SPIRIVA HANDIHALER) 18 MCG inhalation capsule Inhale contents of one capsule daily. 30 capsule 1     Respiratory Therapy Supplies (NEBULIZER COMPRESSOR) KIT 1 Device 4 times daily as needed. 1 kit 3     ORDER FOR DME Use your CPAP device as directed by your provider. Pressure change to min 13 max 18cwp 1 each 99     albuterol (PROAIR HFA, PROVENTIL HFA, VENTOLIN HFA) 108 (90 BASE) MCG/ACT inhaler Inhale 2 puffs into the lungs every 6 hours as needed. 1 Inhaler 11       Family History  family history includes CEREBROVASCULAR DISEASE in her mother; DIABETES in her brother, mother, and sister; HEART DISEASE in her brother, father, mother, and sister; Hypertension in her mother; Psychotic Disorder in her brother; Thyroid Disease in her brother, maternal aunt, maternal uncle, mother, sister, sister, and sister. There is no history of CANCER, Glaucoma, or Macular Degeneration.    Social History  Smoke: yes.  ETOH: rare.      Past Medical History  Past Medical History:   Diagnosis Date     A-fib (H) 2011    on coumadin since 1/13     Asthma     as a kid     Chest pain 2/1/2017     Chronic anticoagulation for a-fib 2/15/2013    INR's followed by coumadin clinic at      Diabetes mellitus (H) 2012     Diastolic heart failure 2/15/2013     Dilated cardiomyopathy (H) 1/8/2013     HTN (hypertension)       Hyperthyroidism     Graves, s/p I131 1/13, now on prednisone and methimazole     Morbid obesity (H)      Pulmonary embolism (H) 1/12    hospitalized in [place], on lovenox/coumadin for a few months but stopped, hypercoag w/u neg per pt     Sleep apnea     using CPAP         Physical Exam  /88 (BP Location: Other (Comment), Patient Position: Sitting, Cuff Size: Adult Large)  Pulse 101  Wt (!) 204.6 kg (451 lb)  SpO2 99%  BMI 70.64 kg/m2  Body mass index is 70.64 kg/(m^2).      RESULTS  Creatinine   Date Value Ref Range Status   02/07/2018 1.19 (H) 0.52 - 1.04 mg/dL Final     GFR Estimate   Date Value Ref Range Status   02/07/2018 49 (L) >60 mL/min/1.7m2 Final     Comment:     Non  GFR Calc     Hemoglobin A1C   Date Value Ref Range Status   04/07/2017 9.0 (H) 4.3 - 6.0 % Final     Potassium   Date Value Ref Range Status   02/07/2018 3.4 3.4 - 5.3 mmol/L Final     ALT   Date Value Ref Range Status   02/07/2018 18 0 - 50 U/L Final     AST   Date Value Ref Range Status   02/07/2018 9 0 - 45 U/L Final     TSH   Date Value Ref Range Status   02/07/2018 7.15 (H) 0.40 - 4.00 mU/L Final     T4 Free   Date Value Ref Range Status   02/07/2018 0.91 0.76 - 1.46 ng/dL Final       Cholesterol   Date Value Ref Range Status   10/26/2016 135 <200 mg/dL Final   07/14/2015 164 <200 mg/dL Final     Comment:     LDL Cholesterol is the primary guide to therapy.   The NCEP recommends further evaluation of: patients with cholesterol greater   than 200 mg/dL if additional risk factors are present, cholesterol greater   than   240 mg/dL, triglycerides greater than 150 mg/dL, or HDL less than 40 mg/dL.       HDL Cholesterol   Date Value Ref Range Status   10/26/2016 37 (L) >49 mg/dL Final   07/14/2015 49 (L) >50 mg/dL Final     LDL Cholesterol Calculated   Date Value Ref Range Status   10/26/2016 68 <100 mg/dL Final     Comment:     Desirable:       <100 mg/dl   07/14/2015 90 0 - 129 mg/dL Final     Comment:     LDL  Cholesterol is the primary guide to therapy: LDL-cholesterol goal in high   risk patients is <100 mg/dL and in very high risk patients is <70 mg/dL.       Triglycerides   Date Value Ref Range Status   10/26/2016 151 (H) <150 mg/dL Final     Comment:     Borderline high:  150-199 mg/dl   High:             200-499 mg/dl   Very high:       >499 mg/dl   Non Fasting     07/14/2015 123 0 - 150 mg/dL Final     Cholesterol/HDL Ratio   Date Value Ref Range Status   07/14/2015 3.4 0.0 - 5.0 Final   01/06/2013 3.6 0.0 - 5.0 Final     A1C  8.9    2/7/2018  A1C  8.8     2/7/2017  A1C  9.0    11/10/2016  A1C 10.7   6/16/2016  A1C 12.1   3/10/2016    ASSESSMENT/PLAN:    1.  TYPE 2 DIABETES MELLITUS: Uncontrolled type 2 diabetes mellitus.  I asked Pricilla to increase her Toujeo 170 units SQ each am and increase her Novolog 65 units with meals with correction insulin.   I had her discontinue Victoza and I ordered Trulicity 1.5 mg SQ once a week.  She does not tolerate Metformin due to GI distress.  Her creat is 0.96  with GFR 63 mL/min in Jan 2017.  She is to check her blood sugar fasting each am and check her blood sugar prelunch and predinner daily.  She was seen by Oph in May 2016 without retinopathy. Will schedule pt to see Oph for annual eye exam.  Flu vaccine today.  Pt to test blood glucose 3 times daily.    2.  HTN: Continue current RXs.    3.  CARDIOMYOPATHY/A FIB:  Pt followed here by Cardiology staff.      4. GRAVE'S DISEASE:  Hx of Grave's disease s/p I 131 in Jan 2013.    Pt became hypothyroid following above treatment and is taking Levothyroxine 200 mcg po daily.  Pt's TSH is 7.15 mU/L today.    Pt instructed to increase her Levothyroxine 200 mcg 1 pill Monday - Saturday and 2 pills on Sundays.  Will recheck TSH/FT4 in 10 weeks.    5.  OBESITY:  Morbid obesity with BMI > 70 kg/m2.  [facility].    6.  Return to Endocrine Clinic in 3 weeks.

## 2018-02-07 NOTE — TELEPHONE ENCOUNTER
Spoke w/ Marty -states Medicare will not allow a range - test strips and lancets saying 3 times a day.

## 2018-02-07 NOTE — TELEPHONE ENCOUNTER
[facility] Prior Authorization Team Request    Medication: Toujeo 300u/ml  Dosin units daily  Qty: 13.5 mls  Day Supply: 24  ND (required for Medicaid members): [address]-52     Insurance   BIN: [id number]  PCN: [id number]  Grp: [id number]  ID: [id number]    CoverMyMeds Key (if applicable):     Additional documentation:       Filling Pharmacy: [facility]  Phone Number: [phone]  Contact:    Pharmacy NPI (required for Medicaid members): [id number]

## 2018-02-07 NOTE — LETTER
2/7/2018       RE: Pricilla Brown  [address]   [facility] [address]     Dear Colleague,    Thank you for referring your patient, Pricilla Brown, to the [facility] ENDOCRINOLOGY at [facility]. Please see a copy of my visit note below.    HPI  Pricilla Brown is a 42 year old female with type 2 diabetes mellitus here today for a follow up visit.  Pt's hx is also significant for morbid obesity, dilated cardiomyopathy with diastolic dysfunction, atrial fibrillation, hx of PE, HTN, Grave's disease and sleep apnea.  For her diabetes, pt is taking Victoza 1.8 mg SQ daily, Toujeo 160 units SQ each am and Novolog 60 units with meals, plus correction insulin.  Her A1C is 8.9 % today.  Her previous A1C was 8.8 %.  Pt's A1C was 10.7 % in 6/2016 .  Pricilla had an A1C value of 12.1 % on 3/10/2016.  Pt has no glucose meter today.  She tells me her FBS values have been in the 180-220 range and her evening blood sugars are in the 200-250 range.  No symptoms of hypoglycemia.  On ROS today, pt reports fatigue and polydipsia.  Some intermittent blurred vision.  She has SOB and is using her CPAP.  No cough today.  She has numbness and tingling in both feet and hands.  Pt denies vomiting, chest pain,  chest pain or abd pain.  She denies diarrhea, dysuria, hematuria and no foot ulcers.    ROS  Please see under HPI.    Allergies  Allergies   Allergen Reactions     Penicillins Other (See Comments)     CHILDHOOD ALLERGY     Ibuprofen Sodium Hives and Rash       Medications  Current Outpatient Prescriptions   Medication Sig Dispense Refill     insulin glargine U-300 (TOUJEO) 300 UNIT/ML injection Inject 170 units SQ each am. 30 mL 3     NOVOLOG FLEXPEN 100 UNIT/ML soln Inject 65 units with meals plus correction. Pt uses approx 190 units in 24 hrs. 60 mL 3     dulaglutide (TRULICITY) 1.5 MG/0.5ML pen Inject 1.5 mg Subcutaneous every 7 days 45 mL 3     gabapentin (NEURONTIN) 300 MG capsule Take 2  capsules (600 mg) by mouth 2 times daily Needs PCP appt for further refills 120 capsule 0     blood glucose monitoring (NO BRAND SPECIFIED) meter device kit Use to test blood sugar 5 times daily or as directed. 1 kit 1     blood glucose monitoring (NO BRAND SPECIFIED) test strip Use to test blood sugars 5 times daily or as directed 200 strip 11     blood glucose (NO BRAND SPECIFIED) lancets standard Use to test blood sugar 5 times daily or as directed. 200 each 11     blood glucose monitoring (NO BRAND SPECIFIED) meter device kit Use to test blood sugar 4 times daily  With meter/ strips/ lancets covered by insurance 1 kit 0     blood glucose monitoring (IGLUCOSE TEST STRIPS) test strip Use to test blood sugar 4 times daily  With meter/ strips lancets covered by insurance 360 strip 3     blood glucose monitoring (ULTRA THIN 30G) lancets Use to test blood sugar 4 times daily  With meter/ strips/ lancets covered by insurance 360 each 3     buPROPion (WELLBUTRIN SR) 100 MG 12 hr tablet Take 1 tablet (100 mg) by mouth 2 times daily 180 tablet 1     blood glucose monitoring (ACCU-CHEK SMARTVIEW) test strip Use to test blood sugar 5 times daily 500 each 3     blood glucose monitoring (ACCU-CHEK FASTCLIX) lancets Use to test blood sugar 5 times daily or as directed. 500 each 3     [DISCONTINUED] insulin glargine U-300 (TOUJEO) 300 UNIT/ML injection Inject 160 units SQ each am. 30 mL 3     torsemide (DEMADEX) 100 MG tablet Take 1 tablet (100 mg) by mouth 2 times daily 180 tablet 1     topiramate (TOPAMAX) 25 MG tablet Take 3 tablets (75 mg) by mouth 2 times daily 180 tablet 0     [DISCONTINUED] gabapentin (NEURONTIN) 300 MG capsule Take 2 capsules (600 mg) by mouth 2 times daily Needs PCP appt for further refills 120 capsule 0     colchicine 0.6 MG tablet Take 1-2 tablets (0.6-1.2 mg) by mouth daily as needed for moderate pain 12 tablet 1     hydrALAZINE (APRESOLINE) 25 MG tablet Take 1 tab (25 mg) in am, 2 tabs (50 mg)  midday, and 2 tabs (50 mg) in pm daily 450 tablet 3     warfarin (COUMADIN) 5 MG tablet Take 20 mg (four tabs) M and F and 15 mg (three tabs) all other days of the week or as directed by the Coumadin Clinic. 100 tablet 6     potassium chloride SA (POTASSIUM CHLORIDE) 20 MEQ CR tablet Take 2 tablets (40 mEq) by mouth 4 times daily 240 tablet 3     insulin pen needle (BD RIVER U/F) 32G X 4 MM Use 6 daily or as directed. 550 each 3     diclofenac (VOLTAREN) 1 % GEL topical gel Apply 4 grams to knees or 2 grams to hands four times daily using enclosed dosing card. 100 g 1     clotrimazole (LOTRIMIN) 1 % cream Apply topically 2 times daily 30 g 1     ciclopirox 8 % SOLN Externally apply topically daily To toenails. 1 Bottle 11     Efinaconazole 10 % SOLN Externally apply topically daily To toenails. 8 mL 11     acetaminophen (TYLENOL) 325 MG tablet Take 2 tablets (650 mg) by mouth 3 times daily as needed for mild pain or fever (total acetaminophen dose should not exceed 3000 mg per day) 180 tablet 5     [DISCONTINUED] NOVOLOG FLEXPEN 100 UNIT/ML soln Inject 60 units with meals plus correction. Pt uses approx 180 units in 24 hrs. 60 mL 11     polyethylene glycol (MIRALAX/GLYCOLAX) powder        levothyroxine (SYNTHROID/LEVOTHROID) 200 MCG tablet Take 1 tablet (200 mcg) by mouth daily 90 tablet 3     morphine (MS CONTIN) 15 MG 12 hr tablet Take 15 mg by mouth daily as needed       order for DME Left foot 1 Units 0     metoprolol (TOPROL-XL) 50 MG 24 hr tablet Take 0.5 tablets (25 mg) by mouth At Bedtime 90 tablet 3     order for DME Equipment being ordered:  2993-5476 $92   Plantar, fasciitis, LG, night splint 1 each 0     capsaicin (ZOSTRIX) 0.025 % CREA Apply 1 g topically 3 times daily as needed 1 Tube 0     order for DME Equipment being ordered: Challenger Wide walker if available - patient needs seat, basket and brakes. 1 each 0     nicotine ([employer] NICOTINE) 14 MG/24HR patch 2h hr Place 1 patch onto the skin every  24 hours (Patient not taking: Reported on 8/7/2017) 30 patch 1     nystatin (MYCOSTATIN) cream Apply topically 2 times daily To toenails 90 g 6     spironolactone (ALDACTONE) 50 MG tablet Take 1 tablet (50 mg) by mouth daily 30 tablet 11     oxyCODONE-acetaminophen (PERCOCET) 5-325 MG per tablet Take 1 tablet by mouth every 8 hours as needed for moderate to severe pain (try to limit use, no further prescriptions until seen in pain clinic) 60 tablet 0     DULoxetine (CYMBALTA) 20 MG capsule Take 1 capsule (20 mg) by mouth 2 times daily 60 capsule 0     senna (SENOKOT) 8.6 MG tablet Take 1 tablet by mouth 2 times daily 45 tablet 0     bisacodyl (DULCOLAX) 10 MG suppository Place 1 suppository (10 mg) rectally daily as needed for constipation 6 suppository 0     Blood Glucose Monitoring Suppl (ACCU-CHEK RON PLUS) W/DEVICE KIT Use to test blood sugars 2 times daily or as directed. 1 kit 0     tiotropium (SPIRIVA HANDIHALER) 18 MCG inhalation capsule Inhale contents of one capsule daily. 30 capsule 1     Respiratory Therapy Supplies (NEBULIZER COMPRESSOR) KIT 1 Device 4 times daily as needed. 1 kit 3     ORDER FOR DME Use your CPAP device as directed by your provider. Pressure change to min 13 max 18cwp 1 each 99     albuterol (PROAIR HFA, PROVENTIL HFA, VENTOLIN HFA) 108 (90 BASE) MCG/ACT inhaler Inhale 2 puffs into the lungs every 6 hours as needed. 1 Inhaler 11       Family History  family history includes CEREBROVASCULAR DISEASE in her mother; DIABETES in her brother, mother, and sister; HEART DISEASE in her brother, father, mother, and sister; Hypertension in her mother; Psychotic Disorder in her brother; Thyroid Disease in her brother, maternal aunt, maternal uncle, mother, sister, sister, and sister. There is no history of CANCER, Glaucoma, or Macular Degeneration.    Social History  Smoke: yes.  ETOH: rare.      Past Medical History  Past Medical History:   Diagnosis Date     A-fib (H) 2011    on coumadin since  1/13     Asthma     as a kid     Chest pain 2/1/2017     Chronic anticoagulation for a-fib 2/15/2013    INR's [facility] coumadin clinic [facility]     Diabetes mellitus (H) 2012     Diastolic heart failure 2/15/2013     Dilated cardiomyopathy (H) 1/8/2013     HTN (hypertension)      Hyperthyroidism     Graves, s/p I131 1/13, now on prednisone and methimazole     Morbid obesity (H)      Pulmonary embolism (H) 1/12    hospitalized in [place], on lovenox/coumadin for a few months but stopped, hypercoag w/u neg per pt     Sleep apnea     using CPAP         Physical Exam  /88 (BP Location: Other (Comment), Patient Position: Sitting, Cuff Size: Adult Large)  Pulse 101  Wt (!) 204.6 kg (451 lb)  SpO2 99%  BMI 70.64 kg/m2  Body mass index is 70.64 kg/(m^2).      RESULTS  Creatinine   Date Value Ref Range Status   02/07/2018 1.19 (H) 0.52 - 1.04 mg/dL Final     GFR Estimate   Date Value Ref Range Status   02/07/2018 49 (L) >60 mL/min/1.7m2 Final     Comment:     Non  GFR Calc     Hemoglobin A1C   Date Value Ref Range Status   04/07/2017 9.0 (H) 4.3 - 6.0 % Final     Potassium   Date Value Ref Range Status   02/07/2018 3.4 3.4 - 5.3 mmol/L Final     ALT   Date Value Ref Range Status   02/07/2018 18 0 - 50 U/L Final     AST   Date Value Ref Range Status   02/07/2018 9 0 - 45 U/L Final     TSH   Date Value Ref Range Status   02/07/2018 7.15 (H) 0.40 - 4.00 mU/L Final     T4 Free   Date Value Ref Range Status   02/07/2018 0.91 0.76 - 1.46 ng/dL Final       Cholesterol   Date Value Ref Range Status   10/26/2016 135 <200 mg/dL Final   07/14/2015 164 <200 mg/dL Final     Comment:     LDL Cholesterol is the primary guide to therapy.   The NCEP recommends further evaluation of: patients with cholesterol greater   than 200 mg/dL if additional risk factors are present, cholesterol greater   than   240 mg/dL, triglycerides greater than 150 mg/dL, or HDL less than 40 mg/dL.       HDL Cholesterol   Date Value Ref Range  Status   10/26/2016 37 (L) >49 mg/dL Final   07/14/2015 49 (L) >50 mg/dL Final     LDL Cholesterol Calculated   Date Value Ref Range Status   10/26/2016 68 <100 mg/dL Final     Comment:     Desirable:       <100 mg/dl   07/14/2015 90 0 - 129 mg/dL Final     Comment:     LDL Cholesterol is the primary guide to therapy: LDL-cholesterol goal in high   risk patients is <100 mg/dL and in very high risk patients is <70 mg/dL.       Triglycerides   Date Value Ref Range Status   10/26/2016 151 (H) <150 mg/dL Final     Comment:     Borderline high:  150-199 mg/dl   High:             200-499 mg/dl   Very high:       >499 mg/dl   Non Fasting     07/14/2015 123 0 - 150 mg/dL Final     Cholesterol/HDL Ratio   Date Value Ref Range Status   07/14/2015 3.4 0.0 - 5.0 Final   01/06/2013 3.6 0.0 - 5.0 Final     A1C  8.9    2/7/2018  A1C  8.8     2/7/2017  A1C  9.0    11/10/2016  A1C 10.7   6/16/2016  A1C 12.1   3/10/2016    ASSESSMENT/PLAN:    1.  TYPE 2 DIABETES MELLITUS: Uncontrolled type 2 diabetes mellitus.  I asked Pricilla to increase her Toujeo 170 units SQ each am and increase her Novolog 65 units with meals with correction insulin.   I had her discontinue Victoza and I ordered Trulicity 1.5 mg SQ once a week.  She does not tolerate Metformin due to GI distress.  Her creat is 0.96  with GFR 63 mL/min in Jan 2017.  She is to check her blood sugar fasting each am and check her blood sugar prelunch and predinner daily.  She was seen by [facility] in May 2016 without retinopathy. Will schedule pt to see Oph for annual eye exam.  Flu vaccine today.  Pt to test blood glucose 3 times daily.    2.  HTN: Continue current RXs.    3.  CARDIOMYOPATHY/A FIB:  Pt followed here by Cardiology staff.      4. GRAVE'S DISEASE:  Hx of Grave's disease s/p I 131 in Jan 2013.    Pt became hypothyroid following above treatment and is taking Levothyroxine 200 mcg po daily.  Pt's TSH is 7.15 mU/L today.    Pt instructed to increase her Levothyroxine 200 mcg 1  pill Monday - Saturday and 2 pills on Sundays.  Will recheck TSH/FT4 in 10 weeks.    5.  OBESITY:  Morbid obesity with BMI > 70 kg/m2.  She has [facility] by the [facility] staff.    6.  Return to Endocrine Clinic in 3 weeks.         Injectable Influenza Immunization Documentation    1.  Is the person to be vaccinated sick today?   No    2. Does the person to be vaccinated have an allergy to a component   of the vaccine?   No  Egg Allergy Algorithm Link    3. Has the person to be vaccinated ever had a serious reaction   to influenza vaccine in the past?   No    4. Has the person to be vaccinated ever had Guillain-Barré syndrome?   No    Form completed by LILY ALCARAZ CMA      Again, thank you for allowing me to participate in the care of your patient.      Sincerely,    Isela Archibald PA-C

## 2018-02-07 NOTE — TELEPHONE ENCOUNTER
Marty with [facility], called to request new Rx for test strips & lancets, due to insurance coverage.    [facility] - Rx should read pt tests 3x per day -or- it should read pt tests 4x per day, with documentation/addendum in EPIC to support testing 4x per day.    [facility] pt has Medicare pt B coverage & testing cannot include a date range. Medicare Pt B requires documentation if pt is testing more that 3x per day.    Pt will be in pharm later today to  supplies.    PharmD can be called at [phone].

## 2018-02-07 NOTE — PROGRESS NOTES

## 2018-02-07 NOTE — TELEPHONE ENCOUNTER
----- Message from Isela Archibald PA-C sent at 2/7/2018  1:49 PM CST -----  Regarding: RE: clarification needed  I discontinued her Victoza dose and ordered Trulicity 1.5 mg once a week.  Thelma archibald  ----- Message -----     From: Sarina Haji RN     Sent: 2/7/2018   1:35 PM       To: Isela Archibald PA-C  Subject: clarification needed                             [facility] pharmacy asking if Trulicity is being added to Victoza Tx. Trulicity Rx'd as maintenance dose. This amount typically Rx'd w/ Victoza. Otherwise, Trulicity typically first Rx'd as loading dose.           I can't see what you were doing

## 2018-02-08 LAB — HBA1C MFR BLD: 8.9 % (ref 4.3–6)

## 2018-02-14 ENCOUNTER — ANTICOAGULATION THERAPY VISIT (OUTPATIENT)
Dept: ANTICOAGULATION | Facility: CLINIC | Age: 43
End: 2018-02-14

## 2018-02-14 DIAGNOSIS — Z79.01 LONG-TERM (CURRENT) USE OF ANTICOAGULANTS: ICD-10-CM

## 2018-02-14 DIAGNOSIS — I48.91 ATRIAL FIBRILLATION (H): ICD-10-CM

## 2018-02-14 NOTE — PROGRESS NOTES
Spoke with Pricilla today. She was at the lab on 2/7/18 but no INR was done. She will get an INR on 2/22/18 when she is at the [facility] for an appointment.  said she is still on the same warfarin regimen. Patient reports has missed 1 dose last week.

## 2018-02-14 NOTE — MR AVS SNAPSHOT
Pricilla Brown   2/14/2018   Anticoagulation Therapy Visit    Description:  43 year old female   Provider:  Blayne Loyd, [facility]   Department:  [facility]           INR as of 2/14/2018     Today's INR No new INR was available at the time of this encounter.      Anticoagulation Summary as of 2/14/2018     INR goal 2.0-2.5   Today's INR No new INR was available at the time of this encounter.   Full instructions 20 mg on Mon, Fri; 15 mg all other days   Next INR check 2/22/2018    Indications   Atrial fibrillation (H) [I48.91] [I48.91]  Long-term (current) use of anticoagulants [Z79.01] [Z79.01]         February 2018 Details    Sun Mon Tue Wed Thu Fri Sat         1               2               3                 4               5               6               7               8               9               10                 11               12               13               14      15 mg   See details      15      15 mg         16      20 mg         17      15 mg           18      15 mg         19      20 mg         20      15 mg         21      15 mg         22            23               24                 25               26               27               28                   Date Details   02/14 This INR check       Date of next INR:  2/22/2018         How to take your warfarin dose     To take:  15 mg Take 3 of the 5 mg tablets.    To take:  20 mg Take 4 of the 5 mg tablets.

## 2018-02-28 ENCOUNTER — ANTICOAGULATION THERAPY VISIT (OUTPATIENT)
Dept: ANTICOAGULATION | Facility: CLINIC | Age: 43
End: 2018-02-28

## 2018-02-28 ENCOUNTER — OFFICE VISIT (OUTPATIENT)
Dept: ENDOCRINOLOGY | Facility: CLINIC | Age: 43
End: 2018-02-28
Payer: MEDICARE

## 2018-02-28 ENCOUNTER — ALLIED HEALTH/NURSE VISIT (OUTPATIENT)
Dept: SURGERY | Facility: CLINIC | Age: 43
End: 2018-02-28
Payer: MEDICARE

## 2018-02-28 VITALS
BODY MASS INDEX: 72.36 KG/M2 | SYSTOLIC BLOOD PRESSURE: 132 MMHG | DIASTOLIC BLOOD PRESSURE: 83 MMHG | WEIGHT: 293 LBS | HEART RATE: 93 BPM

## 2018-02-28 DIAGNOSIS — Z79.01 LONG-TERM (CURRENT) USE OF ANTICOAGULANTS: ICD-10-CM

## 2018-02-28 DIAGNOSIS — I48.91 ATRIAL FIBRILLATION (H): ICD-10-CM

## 2018-02-28 DIAGNOSIS — E11.65 TYPE 2 DIABETES MELLITUS WITH HYPERGLYCEMIA, WITH LONG-TERM CURRENT USE OF INSULIN (H): Primary | ICD-10-CM

## 2018-02-28 DIAGNOSIS — Z79.4 TYPE 2 DIABETES MELLITUS WITH HYPERGLYCEMIA, WITH LONG-TERM CURRENT USE OF INSULIN (H): Primary | ICD-10-CM

## 2018-02-28 LAB — INR PPP: 1.62 (ref 0.86–1.14)

## 2018-02-28 NOTE — MR AVS SNAPSHOT
MRN:[id number]                      After Visit Summary   2/28/2018    Pricilla Brown    MRN: [id number]           Visit Information        Provider Department      2/28/2018 11:30 AM [address] [facility] Surgical Weight Management        Your next 10 appointments already scheduled     Mar 09, 2018 12:00 PM CST   (Arrive by 11:45 AM)   RETURN GENERAL with Russel Vergara OD    Health Ophthalmology ([facility])    [address] [place] [address]  4th [facility] [address]   [phone]            Mar 15, 2018 11:30 AM CDT   Nurse Visit with  Surgery Nurse   General Surgery ([facility])    [address] [place] [id number] [phone]            Mar 19, 2018 10:40 AM CDT   (Arrive by 10:25 AM)   Return Visit with Sonal Ford MD   [facility] Medical Weight Management ([facility])    [address] [place] [id number] [phone]            Mar 20, 2018  1:00 PM CDT   Return Sleep Patient with YOCASTA Horan CNP   [facility], Sleep Study ([facility])    [address] [place] [id number] [phone]            Mar 29, 2018 11:00 AM CDT   (Arrive by 10:45 AM)   NUTRITION VISIT with YARY Crawley [facility] Surgical Weight Management ([facility])    [address] [place] [id number] [phone]            Apr 12, 2018 10:15 AM CDT   LAB with  LAB    Health Lab ([facility])    [address] [place] [id number] [phone]           Please do not eat 10-12 hours before your appointment if you are coming in fasting for labs on lipids, cholesterol, or glucose (sugar). This does not apply to pregnant women. Water, hot tea and black coffee (with nothing added) are okay. Do not  drink other fluids, diet soda or chew gum.            Apr 12, 2018 11:00 AM CDT   (Arrive by 10:45 AM)   RETURN DIABETES with Isela Archibald PA-C   [facility] Endocrinology ([facility])    [address] [place] [address]  3rd [facility] [id number] [phone]            May 01, 2018  9:00 AM CDT   LAB with  LAB   [facility] Lab ([facility])    [address] [place] [id number] [phone]           Please do not eat 10-12 hours before your appointment if you are coming in fasting for labs on lipids, cholesterol, or glucose (sugar). This does not apply to pregnant women. Water, hot tea and black coffee (with nothing added) are okay. Do not drink other fluids, diet soda or chew gum.            May 01, 2018 10:00 AM CDT   (Arrive by 9:45 AM)   Return Visit with Silva Damon MD   [facility] Medical Weight Management ([facility])    [address] [place] [address]  4th [facility] [id number] [phone]            May 29, 2018  8:30 AM CDT   (Arrive by 8:15 AM)   RETURN HEART FAILURE with Percy Alcala MD   [facility] Heart Care ([facility])    [address] [place] [address]  [address]  [facility] [id number] [phone]              [employer] Information     [employer] gives you secure access to your electronic health record. If you see a primary care provider, you can also send messages to your care team and make appointments. If you have questions, please call your primary care clinic.  If you do not have a primary care provider, please call [phone] and they will assist you.      [employer] is an electronic gateway that provides easy, online access to your medical records. With [employer], you can request a clinic appointment, read your test results, renew a prescription or communicate with your care team.     To access your existing account, please contact your [facility]  [facility] [facility] or call [phone] for assistance.        Care EveryWhere ID     This is your Care EveryWhere ID. This could be used by other organizations to access your [place] medical records  [id number]        Equal Access to Services     KIERSTEN DRIVER : Maryam Woodward, walawanda campos, qafabiola kaalmada alyssa, bola walker. So [facility] [phone].    ATENCIÓN: Si habla español, tiene a de la rosa disposición servicios gratuitos de asistencia lingüística. Llame al [phone].    We comply with applicable federal civil rights laws and [place] laws. We do not discriminate on the basis of race, color, national origin, age, disability, sex, sexual orientation, or gender identity.

## 2018-02-28 NOTE — PROGRESS NOTES
"  Bariatric Nutrition Consultation Note    Reason For Visit: Nutrition Reassessment    Pricilla Brown is a 42 year-old (type 2 DM on insulin sliding scale) presenting today for bariatric nutrition consult.  Pt is interested in laparoscopic sleeve gastrectomy.  Pt has met all required RD visits prior to surgery.  Pt was referred by NANETTE Kerr and Dr. Silva Damon.    Coordination Notes:,   (1/22/18): Pt states sleep study is scheduled in March. Pt reports she is waiting to receive information on where to schedule psych eval. Writer messaged nurse coordinator Sylvia Rosas.       ANTHROPOMETRICS:  Ht: 67\"  Initial Wt: 462.6 lbs with BMI of 72.6  Current Wt: 462.2 lbs (+3.1 lbs from last month; -0.4 lbs from initial weight)     Required weight loss goal pre-op: -46 lbs from initial consult weight (goal weight 416.6 lbs or less before surgery)    NUTRITION HISTORY:  Food Allergies/Intolerances: none known  Cravings: sweets, chips, pop (diet)  Triggers/cues causing extra eating: boredom (currently on disability, not working)  *Pt is on topiramate, but feels it no longer is working.  She plans to follow-up with Dr. Silva Damon soon. -> Missed last appointment with Dr. Ascencio. Encouraged to follow up with medical weight management.     Progress with Previous Goals:  Relating To Eating:  Track food intake prior to eating on MyFitnessPal.com with a target of no more than 1,800 Calories for 2 lbs/week weight loss, 2400 mg sodium or less/day would be great. Meeting. 1 protein shake/day + 2 solid meals (e.g. Baked chicken, veggies, 1/2 c. Rice).    *Have 1 protein meal replacement shake daily (Premier Protein).    *Eat as much non-starchy vegetables as you need to fill you up (no calorie-containing dips/condiments).    (non-starchy veg: green leafy vegetables, cucumbers, broccoli, cauliflower, green string beans)   *Have 1 fruit daily.   *Drink at least 64 oz water between meals daily     -Eat slowly " (20-30 minutes per meal), chewing foods well (25 chews per bite/applesauce consistency) Meeting   -Increase exercise as able.  (Pool therapy, walking, etc) Not met this past month. States lack of walk r/t poor weather.   -Continue to work on quitting smoking. Not met, continues.       NUTRITION DIAGNOSIS:  Obesity r/t long history of self-monitoring deficit and excessive energy intake aeb BMI >30.- continues    INTERVENTION:  Intervention Provided/Education Provided:  Reviewed previous goals, encouraging re-attempting the modified liquid diet and to follow-up with medical weight management.  Encouraged exercise.  Discussed ways to optimize satiety (focusing on lean protein and non-starchy veg at solid meals, using non-starchy veg as fillers between meals and/or option from 100 calorie snack list up to twice daily). Emphasized the importance of mindful eating.  Gave encouragement and support.  Provided Pt with list of goals and task list.      Patient Understanding: good  Expected Compliance: good      GOALS:  Relating To Eating:  -Plan A:  Follow the Modified Liquid Diet for weight loss:  Breakfast: Protein Shake (Premier Protein)  Lunch: 3 oz lean protein + non-starchy vegetables  Supper: Protein Shake (Premier Protein)  Snack: non-starchy vegetables (no calorie-containing dips/condiments)  Beverages: at least 48-64 oz water between meals daily    *Protein Shake Criteria: ~200 Calories, at least 20 grams of protein, and less than 10 grams of sugar     -Plan B (if plan A is backfiring on you): Track food intake prior to eating on MyFitnessPal.[email or web address] with a target of no more than 1,800 Calories for 2 lbs/week weight loss, 2400 mg sodium or less/day would be great.   *Have 1 protein meal replacement shake daily (Premier Protein).    *Eat as much non-starchy vegetables as you need to fill you up (no calorie-containing dips/condiments).    (non-starchy veg: green leafy vegetables, cucumbers, broccoli, cauliflower, green  string beans)   *Have 1 fruit daily.   *Drink at least 64 oz water between meals daily    -Eat slowly (20-30 minutes per meal), chewing foods well (25 chews per bite/applesauce consistency)    -Increase exercise as able.  (Pool therapy, walking, etc)    -Continue to work on quitting smoking.     Follow-Up: follow-up with RD in 1 month.     Time spent with patient: 15 minutes     Rand David RD, LD

## 2018-02-28 NOTE — MR AVS SNAPSHOT
Pricilla Brown   2/28/2018   Anticoagulation Therapy Visit    Description:  43 year old female   Provider:  Alecia Jacobson, RN   Department:  [facility]           INR as of 2/28/2018     Today's INR 1.62!      Anticoagulation Summary as of 2/28/2018     INR goal 2.0-2.5   Today's INR 1.62!   Full instructions 2/28: 20 mg; Otherwise 20 mg on Mon, Fri; 15 mg all other days   Next INR check 3/15/2018    Indications   Atrial fibrillation (H) [I48.91] [I48.91]  Long-term (current) use of anticoagulants [Z79.01] [Z79.01]         February 2018 Details    Sun Mon Tue Wed Thu Fri Sat         1               2               3                 4               5               6               7               8               9               10                 11               12               13               14               15               16               17                 18               19               20               21               22               23               24                 25               26               27               28      20 mg   See details          Date Details   02/28 This INR check               How to take your warfarin dose     To take:  20 mg Take 4 of the 5 mg tablets.           March 2018 Details    Sun Mon Tue Wed Thu Fri Sat         1      15 mg         2      20 mg         3      15 mg           4      15 mg         5      20 mg         6      15 mg         7      15 mg         8      15 mg         9      20 mg         10      15 mg           11      15 mg         12      20 mg         13      15 mg         14      15 mg         15            16               17                 18               19               20               21               22               23               24                 25               26               27               28               29               30               31                Date Details   No additional details    Date of next INR:   3/15/2018         How to take your warfarin dose     To take:  15 mg Take 3 of the 5 mg tablets.    To take:  20 mg Take 4 of the 5 mg tablets.

## 2018-02-28 NOTE — MR AVS SNAPSHOT
After Visit Summary   2/28/2018    Pricilla Brown    MRN: [id number]           Patient Information     Date Of Birth          1975        Visit Information        Provider Department      2/28/2018 12:30 PM Isela Archibald PA-C [facility]         Follow-ups after your visit        Your next 10 appointments already scheduled     Feb 28, 2018  1:40 PM CST   (Arrive by 1:25 PM)   RETURN GENERAL with Russel Vergara OD    Health Ophthalmology ([facility])    [address] [place] [id number]   [phone]            Mar 15, 2018 11:30 AM CDT   Nurse Visit with  Surgery Nurse   General Surgery ([facility])    [address] [place] [id number] [phone]            Mar 19, 2018 10:40 AM CDT   (Arrive by 10:25 AM)   Return Visit with Sonal Ford MD   [facility] Management ([facility])    [address] [place] [phone]   [phone]            Mar 20, 2018  1:00 PM CDT   Return Sleep Patient with YOCASTA Horan [facility] [place], Sleep Study ([facility])    [address] [place] [id number] [phone]            Mar 29, 2018 11:00 AM CDT   (Arrive by 10:45 AM)   NUTRITION VISIT with Velvet Mcallister RD   [facility] Surgical Weight Management ([facility])    [address] [place] [id number]   [phone]            Apr 12, 2018 10:15 AM CDT   LAB with  LAB   [facility] ([facility])    [address] [place] [id number] [phone]           Please do not eat 10-12 hours before your appointment if you are coming in fasting for labs on lipids, cholesterol, or glucose (sugar). This does not apply to pregnant women. Water, hot tea  and black coffee (with nothing added) are okay. Do not drink other fluids, diet soda or chew gum.            Apr 12, 2018 11:00 AM CDT   (Arrive by 10:45 AM)   RETURN DIABETES with Isela Archibald PA-C   [facility] Endocrinology ([facility])    [address] [facility]  [address]rd [facility] [id number] [phone]            May 01, 2018  9:00 AM CDT   LAB with  LAB   [facility] Lab ([facility])    [address] [place] [address]  [address]st [facility] [phone] [phone]           Please do not eat 10-12 hours before your appointment if you are coming in fasting for labs on lipids, cholesterol, or glucose (sugar). This does not apply to pregnant women. Water, hot tea and black coffee (with nothing added) are okay. Do not drink other fluids, diet soda or chew gum.            May 01, 2018 10:00 AM CDT   (Arrive by 9:45 AM)   Return Visit with Silva Damon MD   [facility] Medical Weight Management ([facility])    [address] [place] [address]  [address]th [facility] [id number] [phone]            May 29, 2018  8:30 AM CDT   (Arrive by 8:15 AM)   RETURN HEART FAILURE with Percy Alcala MD   [facility] Heart Care ([facility])    [address] [place] [address]  [address]  [facility] [id number] [phone]              Who to contact     Please call your clinic at [phone] to:    Ask questions about your health    Make or cancel appointments    Discuss your medicines    Learn about your test results    Speak to your doctor            Additional Information About Your Visit        netTALKhart Information     [employer] gives you secure access to your electronic health record. If you see a primary care provider, you can also send messages to your care team and make appointments. If you have questions, please call your primary care clinic.  If you do not have a primary care provider, please call [phone] and  they will assist you.      [employer] is an electronic gateway that provides easy, online access to your medical records. With [employer], you can request a clinic appointment, read your test results, renew a prescription or communicate with your care team.     To access your existing account, please contact your [facility] or call [phone] for assistance.        Care EveryWhere ID     This is your Care EveryWhere ID. This could be used by other organizations to access your [place] medical records  [id number]        Your Vitals Were     Pulse BMI (Body Mass Index)                93 72.36 kg/m2           Blood Pressure from Last 3 Encounters:   02/28/18 132/83   02/07/18 140/88   12/12/17 142/83    Weight from Last 3 Encounters:   02/28/18 (!) 209.6 kg (462 lb)   02/07/18 (!) 204.6 kg (451 lb)   02/07/18 (!) 204.6 kg (451 lb 1.6 oz)              Today, you had the following     No orders found for display       Primary Care Provider Office Phone # Fax #    Jamilah Bernal [phone]       [address] [address]        Equal Access to Services     KIERSTEN DRIVER : Hadii miki oviedoo Sorosmery, waaxda luqadaha, qaybta kaalmada adeegyada, bola walker. So [facility] [phone].    ATENCIÓN: Si habla español, tiene a de la rosa disposición servicios gratuitos de asistencia lingüística. Llame al [phone].    We comply with applicable federal civil rights laws and [place] laws. We do not discriminate on the basis of race, color, national origin, age, disability, sex, sexual orientation, or gender identity.            Thank you!     Thank you for choosing [facility]  for your care. Our goal is always to provide you with excellent care. Hearing back from our patients is one way we can continue to improve our services. Please take a few minutes to complete the written survey that you may receive in the mail after  your visit with us. Thank you!             Your Updated Medication List - Protect others around you: Learn how to safely use, store and throw away your medicines at www.disposemymeds.org.          This list is accurate as of 2/28/18 12:43 PM.  Always use your most recent med list.                   Brand Name Dispense Instructions for use Diagnosis    acetaminophen 325 MG tablet    TYLENOL    180 tablet    Take 2 tablets (650 mg) by mouth 3 times daily as needed for mild pain or fever (total acetaminophen dose should not exceed 3000 mg per day)    Neuropathic pain of lower extremity, unspecified laterality       albuterol 108 (90 BASE) MCG/ACT Inhaler    PROAIR HFA/PROVENTIL HFA/VENTOLIN HFA    1 Inhaler    Inhale 2 puffs into the lungs every 6 hours as needed.    Reactive airway disease       bisacodyl 10 MG Suppository    DULCOLAX    6 suppository    Place 1 suppository (10 mg) rectally daily as needed for constipation    Constipation       blood glucose lancets standard    no brand specified    200 each    Use to test blood sugar 5 times daily or as directed.    Diabetes mellitus, type 2 (H)       * blood glucose monitoring lancets     500 each    Use to test blood sugar 5 times daily or as directed.    Type 2 diabetes mellitus with hyperglycemia, with long-term current use of insulin (H)       * blood glucose monitoring lancets     360 each    Use to test blood sugar 3 times daily  With meter/ strips/ lancets covered by insurance    Type 2 diabetes mellitus (H)       * blood glucose monitoring meter device kit     1 kit    Use to test blood sugars 2 times daily or as directed.    Type II or unspecified type diabetes mellitus without mention of complication, not stated as uncontrolled       * blood glucose monitoring meter device kit    no brand specified    1 kit    Use to test blood sugar 4 times daily  With meter/ strips/ lancets covered by insurance    Type 2 diabetes mellitus (H)       * iBG star W/DEVICE  Kit     1 kit    -PLEASE GIVE PATIENT A DEVICE HER INSURANCE WILL COVER-    Type 2 diabetes mellitus with hyperglycemia, with long-term current use of insulin (H)       * blood glucose monitoring meter device kit    no brand specified    1 kit    Use to test blood sugar 3 times daily or as directed.    Diabetes mellitus, type 2 (H)       * blood glucose monitoring test strip    ACCU-CHEK SMARTVIEW    500 each    Use to test blood sugar 5 times daily    Type 2 diabetes mellitus with hyperglycemia, with long-term current use of insulin (H)       * blood glucose monitoring test strip    IGLUCOSE TEST STRIPS    360 strip    Use to test blood sugar 4 times daily  With meter/ strips lancets covered by insurance    Type 2 diabetes mellitus (H)       * blood glucose monitoring test strip    no brand specified    100 strip    Use to test blood sugar 3-4 times daily or as directed with insurance covered strips    Type 2 diabetes mellitus with hyperglycemia, with long-term current use of insulin (H)       * blood glucose monitoring test strip    no brand specified    100 strip    Use to test blood sugars 3 times daily or as directed    Diabetes mellitus, type 2 (H)       buPROPion 100 MG 12 hr tablet    WELLBUTRIN SR    180 tablet    Take 1 tablet (100 mg) by mouth 2 times daily    Tobacco abuse       capsaicin 0.025 % Crea cream    ZOSTRIX    1 Tube    Apply 1 g topically 3 times daily as needed    Dermatophytosis of nail       ciclopirox 8 % Soln     1 Bottle    Externally apply topically daily To toenails.    Dermatophytosis of nail       clotrimazole 1 % cream    LOTRIMIN    30 g    Apply topically 2 times daily    Tinea pedis of right foot       colchicine 0.6 MG tablet     12 tablet    Take 1-2 tablets (0.6-1.2 mg) by mouth daily as needed for moderate pain    Acute gouty arthritis       diclofenac 1 % Gel topical gel    VOLTAREN    100 g    Apply 4 grams to knees or 2 grams to hands four times daily using enclosed  dosing card.    Left foot pain       dulaglutide 1.5 MG/0.5ML pen    TRULICITY    45 mL    Inject 1.5 mg Subcutaneous every 7 days    Type 2 diabetes mellitus with hyperglycemia, with long-term current use of insulin (H)       DULoxetine 20 MG EC capsule    CYMBALTA    60 capsule    Take 1 capsule (20 mg) by mouth 2 times daily    Depression       Efinaconazole 10 % Soln     8 mL    Externally apply topically daily To toenails.    Dermatophytosis of nail       gabapentin 300 MG capsule    NEURONTIN    120 capsule    Take 2 capsules (600 mg) by mouth 2 times daily Needs PCP appt for further refills    Type 2 diabetes mellitus with hyperglycemia, with long-term current use of insulin (H)       hydrALAZINE 25 MG tablet    APRESOLINE    450 tablet    Take 1 tab (25 mg) in am, 2 tabs (50 mg) midday, and 2 tabs (50 mg) in pm daily    Dilated cardiomyopathy (H)       insulin glargine U-300 300 UNIT/ML injection    TOUJEO    30 mL    Inject 170 units SQ each am.    Type 2 diabetes mellitus with hyperglycemia, with long-term current use of insulin (H)       insulin pen needle 32G X 4 MM    BD RIVER U/F    550 each    Use 6 daily or as directed.    Diabetes mellitus, type 2 (H)       levothyroxine 200 MCG tablet    SYNTHROID/LEVOTHROID    102 tablet    Take 1 tab Monday thru Saturday and 2 tabs on Sundays.    Other specified hypothyroidism       metoprolol succinate 50 MG 24 hr tablet    TOPROL-XL    90 tablet    Take 0.5 tablets (25 mg) by mouth At Bedtime    Chronic diastolic heart failure (H)       morphine 15 MG 12 hr tablet    MS CONTIN     Take 15 mg by mouth daily as needed        Nebulizer Compressor Kit     1 kit    1 Device 4 times daily as needed.    COPD (chronic obstructive pulmonary disease) (H)       nicotine 14 MG/24HR 24 hr patch    [employer] NICOTINE    30 patch    Place 1 patch onto the skin every 24 hours    Tobacco abuse       NovoLOG FLEXPEN 100 UNIT/ML injection   Generic drug:  insulin aspart     60 mL     Inject 65 units with meals plus correction. Pt uses approx 190 units in 24 hrs.    Type 2 diabetes mellitus with hyperglycemia, with long-term current use of insulin (H)       nystatin cream    MYCOSTATIN    90 g    Apply topically 2 times daily To toenails    Dermatophytosis of nail       order for DME     1 each    Use your CPAP device as directed by your provider. Pressure change to min 13 max 18cwp        * order for DME     1 each    Equipment being ordered: Challenger Wide walker if available - patient needs seat, basket and brakes.    Dilated cardiomyopathy (H), Chronic systolic heart failure (H)       * order for DME     1 each    Equipment being ordered:  6823-6609 $92  Plantar, fasciitis, LG, night splint    Dermatophytosis of nail, Plantar fasciitis of right foot, Diabetic neuropathy with neurologic complication (H), Peroneal tendinitis, right       * order for DME     1 Units    Left foot    Left foot pain, Diabetic neuropathy with neurologic complication (H)       oxyCODONE-acetaminophen 5-325 MG per tablet    PERCOCET    60 tablet    Take 1 tablet by mouth every 8 hours as needed for moderate to severe pain (try to limit use, no further prescriptions until seen in pain clinic)    Lumbago, Bilateral low back pain with sciatica, sciatica laterality unspecified       polyethylene glycol powder    MIRALAX/GLYCOLAX          potassium chloride SA 20 MEQ CR tablet    KLOR-CON    240 tablet    Take 2 tablets (40 mEq) by mouth 4 times daily    Hypokalemia       senna 8.6 MG tablet    SENOKOT    45 tablet    Take 1 tablet by mouth 2 times daily    Constipation       spironolactone 50 MG tablet    ALDACTONE    30 tablet    Take 1 tablet (50 mg) by mouth daily        tiotropium 18 MCG capsule    SPIRIVA HANDIHALER    30 capsule    Inhale contents of one capsule daily.    COPD (chronic obstructive pulmonary disease) (H)       topiramate 25 MG tablet    TOPAMAX    180 tablet    Take 3 tablets (75 mg) by mouth  2 times daily    Morbid obesity, unspecified obesity type (H)       torsemide 100 MG tablet    DEMADEX    180 tablet    Take 1 tablet (100 mg) by mouth 2 times daily    Chronic diastolic heart failure (H)       warfarin 5 MG tablet    COUMADIN    100 tablet    Take 20 mg (four tabs) M and F and 15 mg (three tabs) all other days of the week or as directed by the Coumadin Clinic.    Long term current use of anticoagulant therapy       * Notice:  This list has 13 medication(s) that are the same as other medications prescribed for you. Read the directions carefully, and ask your doctor or other care provider to review them with you.

## 2018-02-28 NOTE — TELEPHONE ENCOUNTER
Just was checking in on this. Looks like the plan is currently limiting her to 1 box ( about 7 days ) and would require a qty limit P.A. Patient called asking about it this morning.    Thank you,  Pedro Fontenot [facility]  [phone]

## 2018-02-28 NOTE — PROGRESS NOTES
HPI  Pricilla Brown is a 43 year old female with type 2 diabetes mellitus here today for a follow up visit.  Pt's hx is also significant for morbid obesity, dilated cardiomyopathy with diastolic dysfunction, atrial fibrillation, hx of PE, HTN, Grave's disease and sleep apnea.  For her diabetes, pt was to take Trulicity 1.5 mg SQ once a week and discontinue her Victoza.  She tells me she stopped her Victoza, but has not taken Trulicity.  She will be picking up the Trulicity at the pharmacy today.  She remains on Toujeo 170 units SQ each am and Novolog 65 units with meals, plus correction insulin ( 2 units/50 for BG > 150 ).  Her A1C was 8.9 %on 2/7/2018.  Her previous A1C was 8.8 %.  Pt's A1C was 10.7 % in 6/2016 .  Pricilla had an A1C value of 12.1 % on 3/10/2016.  Pt has no glucose meter today.  On ROS today, pt reports fatigue.  Some intermittent blurred vision.  She has SOB and is using her CPAP. Denies cough.  She has numbness and tingling in both feet and hands.  Pt denies vomiting, chest pain,  chest pain or abd pain.  She denies diarrhea, dysuria, hematuria and no foot ulcers.    ROS  Please see under HPI.    Allergies  Allergies   Allergen Reactions     Penicillins Other (See Comments)     CHILDHOOD ALLERGY     Ibuprofen Sodium Hives and Rash       Medications  Current Outpatient Prescriptions   Medication Sig Dispense Refill     insulin glargine U-300 (TOUJEO) 300 UNIT/ML injection Inject 170 units SQ each am. 30 mL 3     NOVOLOG FLEXPEN 100 UNIT/ML soln Inject 65 units with meals plus correction. Pt uses approx 190 units in 24 hrs. 60 mL 3     gabapentin (NEURONTIN) 300 MG capsule Take 2 capsules (600 mg) by mouth 2 times daily Needs PCP appt for further refills 120 capsule 0     blood glucose (NO BRAND SPECIFIED) lancets standard Use to test blood sugar 5 times daily or as directed. 200 each 11     Blood Glucose Monitoring Suppl (IBG STAR) W/DEVICE KIT -PLEASE GIVE PATIENT A DEVICE HER INSURANCE WILL COVER- 1  kit 0     blood glucose monitoring (NO BRAND SPECIFIED) test strip Use to test blood sugar 3-4 times daily or as directed with insurance covered strips 100 strip 3     levothyroxine (SYNTHROID/LEVOTHROID) 200 MCG tablet Take 1 tab Monday thru Saturday and 2 tabs on Sundays. 102 tablet 3     blood glucose monitoring (NO BRAND SPECIFIED) test strip Use to test blood sugars 3 times daily or as directed 100 strip 11     blood glucose monitoring (ULTRA THIN 30G) lancets Use to test blood sugar 3 times daily  With meter/ strips/ lancets covered by insurance 360 each 3     blood glucose monitoring (NO BRAND SPECIFIED) meter device kit Use to test blood sugar 3 times daily or as directed. 1 kit 1     blood glucose monitoring (NO BRAND SPECIFIED) meter device kit Use to test blood sugar 4 times daily  With meter/ strips/ lancets covered by insurance 1 kit 0     blood glucose monitoring (IGLUCOSE TEST STRIPS) test strip Use to test blood sugar 4 times daily  With meter/ strips lancets covered by insurance 360 strip 3     buPROPion (WELLBUTRIN SR) 100 MG 12 hr tablet Take 1 tablet (100 mg) by mouth 2 times daily 180 tablet 1     blood glucose monitoring (ACCU-CHEK SMARTVIEW) test strip Use to test blood sugar 5 times daily 500 each 3     blood glucose monitoring (ACCU-CHEK FASTCLIX) lancets Use to test blood sugar 5 times daily or as directed. 500 each 3     torsemide (DEMADEX) 100 MG tablet Take 1 tablet (100 mg) by mouth 2 times daily 180 tablet 1     topiramate (TOPAMAX) 25 MG tablet Take 3 tablets (75 mg) by mouth 2 times daily 180 tablet 0     colchicine 0.6 MG tablet Take 1-2 tablets (0.6-1.2 mg) by mouth daily as needed for moderate pain 12 tablet 1     hydrALAZINE (APRESOLINE) 25 MG tablet Take 1 tab (25 mg) in am, 2 tabs (50 mg) midday, and 2 tabs (50 mg) in pm daily 450 tablet 3     warfarin (COUMADIN) 5 MG tablet Take 20 mg (four tabs) M and F and 15 mg (three tabs) all other days of the week or as directed by the  Coumadin Clinic. 100 tablet 6     potassium chloride SA (POTASSIUM CHLORIDE) 20 MEQ CR tablet Take 2 tablets (40 mEq) by mouth 4 times daily 240 tablet 3     insulin pen needle (BD RIVER U/F) 32G X 4 MM Use 6 daily or as directed. 550 each 3     diclofenac (VOLTAREN) 1 % GEL topical gel Apply 4 grams to knees or 2 grams to hands four times daily using enclosed dosing card. 100 g 1     clotrimazole (LOTRIMIN) 1 % cream Apply topically 2 times daily 30 g 1     ciclopirox 8 % SOLN Externally apply topically daily To toenails. 1 Bottle 11     Efinaconazole 10 % SOLN Externally apply topically daily To toenails. 8 mL 11     acetaminophen (TYLENOL) 325 MG tablet Take 2 tablets (650 mg) by mouth 3 times daily as needed for mild pain or fever (total acetaminophen dose should not exceed 3000 mg per day) 180 tablet 5     polyethylene glycol (MIRALAX/GLYCOLAX) powder        morphine (MS CONTIN) 15 MG 12 hr tablet Take 15 mg by mouth daily as needed       order for DME Left foot 1 Units 0     order for DME Equipment being ordered: [id number] $92   Plantar, fasciitis, LG, night splint 1 each 0     capsaicin (ZOSTRIX) 0.025 % CREA Apply 1 g topically 3 times daily as needed 1 Tube 0     order for DME Equipment being ordered: Challenger Herminia walker if available - patient needs seat, basket and brakes. 1 each 0     nicotine ([employer] NICOTINE) 14 MG/24HR patch 2h hr Place 1 patch onto the skin every 24 hours 30 patch 1     nystatin (MYCOSTATIN) cream Apply topically 2 times daily To toenails 90 g 6     spironolactone (ALDACTONE) 50 MG tablet Take 1 tablet (50 mg) by mouth daily 30 tablet 11     oxyCODONE-acetaminophen (PERCOCET) 5-325 MG per tablet Take 1 tablet by mouth every 8 hours as needed for moderate to severe pain (try to limit use, no further prescriptions until seen in pain clinic) 60 tablet 0     DULoxetine (CYMBALTA) 20 MG capsule Take 1 capsule (20 mg) by mouth 2 times daily 60 capsule 0     senna (SENOKOT) 8.6 MG  tablet Take 1 tablet by mouth 2 times daily 45 tablet 0     bisacodyl (DULCOLAX) 10 MG suppository Place 1 suppository (10 mg) rectally daily as needed for constipation 6 suppository 0     Blood Glucose Monitoring Suppl (ACCU-CHEK RON PLUS) W/DEVICE KIT Use to test blood sugars 2 times daily or as directed. 1 kit 0     tiotropium (SPIRIVA HANDIHALER) 18 MCG inhalation capsule Inhale contents of one capsule daily. 30 capsule 1     Respiratory Therapy Supplies (NEBULIZER COMPRESSOR) KIT 1 Device 4 times daily as needed. 1 kit 3     ORDER FOR DME Use your CPAP device as directed by your provider. Pressure change to min 13 max 18cwp 1 each 99     albuterol (PROAIR HFA, PROVENTIL HFA, VENTOLIN HFA) 108 (90 BASE) MCG/ACT inhaler Inhale 2 puffs into the lungs every 6 hours as needed. 1 Inhaler 11     dulaglutide (TRULICITY) 1.5 MG/0.5ML pen Inject 1.5 mg Subcutaneous every 7 days (Patient not taking: Reported on 2/28/2018) 45 mL 3     metoprolol (TOPROL-XL) 50 MG 24 hr tablet Take 0.5 tablets (25 mg) by mouth At Bedtime (Patient not taking: Reported on 2/28/2018) 90 tablet 3       Family History  family history includes CEREBROVASCULAR DISEASE in her mother; DIABETES in her brother, mother, and sister; HEART DISEASE in her brother, father, mother, and sister; Hypertension in her mother; Psychotic Disorder in her brother; Thyroid Disease in her brother, maternal aunt, maternal uncle, mother, sister, sister, and sister. There is no history of CANCER, Glaucoma, or Macular Degeneration.    Social History  Smoke: yes.  ETOH: rare.      Past Medical History  Past Medical History:   Diagnosis Date     A-fib (H) 2011    on coumadin since 1/13     Asthma     as a kid     Chest pain 2/1/2017     Chronic anticoagulation for a-fib 2/15/2013    INR's followed by coumadin clinic at      Diabetes mellitus (H) 2012     Diastolic heart failure 2/15/2013     Dilated cardiomyopathy (H) 1/8/2013     HTN (hypertension)      Hyperthyroidism      [place], s/p I131 1/13, now on prednisone and methimazole     Morbid obesity (H)      Pulmonary embolism (H) 1/12    hospitalized in [place], on lovenox/coumadin for a few months but stopped, hypercoag w/u neg per pt     Sleep apnea     using CPAP         Physical Exam  /83 (BP Location: Right arm, Patient Position: Sitting, Cuff Size: Adult Large)  Pulse 93  Wt (!) 209.6 kg (462 lb)  BMI 72.36 kg/m2  Body mass index is 72.36 kg/(m^2).      RESULTS  Creatinine   Date Value Ref Range Status   02/07/2018 1.19 (H) 0.52 - 1.04 mg/dL Final     GFR Estimate   Date Value Ref Range Status   02/07/2018 49 (L) >60 mL/min/1.7m2 Final     Comment:     Non  GFR Calc     Hemoglobin A1C   Date Value Ref Range Status   04/07/2017 9.0 (H) 4.3 - 6.0 % Final     Potassium   Date Value Ref Range Status   02/07/2018 3.4 3.4 - 5.3 mmol/L Final     ALT   Date Value Ref Range Status   02/07/2018 18 0 - 50 U/L Final     AST   Date Value Ref Range Status   02/07/2018 9 0 - 45 U/L Final     TSH   Date Value Ref Range Status   02/07/2018 7.15 (H) 0.40 - 4.00 mU/L Final     T4 Free   Date Value Ref Range Status   02/07/2018 0.91 0.76 - 1.46 ng/dL Final       Cholesterol   Date Value Ref Range Status   10/26/2016 135 <200 mg/dL Final   07/14/2015 164 <200 mg/dL Final     Comment:     LDL Cholesterol is the primary guide to therapy.   The NCEP recommends further evaluation of: patients with cholesterol greater   than 200 mg/dL if additional risk factors are present, cholesterol greater   than   240 mg/dL, triglycerides greater than 150 mg/dL, or HDL less than 40 mg/dL.       HDL Cholesterol   Date Value Ref Range Status   10/26/2016 37 (L) >49 mg/dL Final   07/14/2015 49 (L) >50 mg/dL Final     LDL Cholesterol Calculated   Date Value Ref Range Status   10/26/2016 68 <100 mg/dL Final     Comment:     Desirable:       <100 mg/dl   07/14/2015 90 0 - 129 mg/dL Final     Comment:     LDL Cholesterol is the primary guide to  therapy: LDL-cholesterol goal in high   risk patients is <100 mg/dL and in very high risk patients is <70 mg/dL.       Triglycerides   Date Value Ref Range Status   10/26/2016 151 (H) <150 mg/dL Final     Comment:     Borderline high:  150-199 mg/dl   High:             200-499 mg/dl   Very high:       >499 mg/dl   Non Fasting     07/14/2015 123 0 - 150 mg/dL Final     Cholesterol/HDL Ratio   Date Value Ref Range Status   07/14/2015 3.4 0.0 - 5.0 Final   01/06/2013 3.6 0.0 - 5.0 Final     A1C  8.9    2/7/2018  A1C  8.8     2/7/2017  A1C  9.0    11/10/2016  A1C 10.7   6/16/2016  A1C 12.1   3/10/2016    ASSESSMENT/PLAN:    1.  TYPE 2 DIABETES MELLITUS: Uncontrolled type 2 diabetes mellitus.  Pricilla will  her Trulicity 1.5 mg SQ once a week today at the pharmacy following this clinic visit.  She is to remain on Toujeo 170 units SQ each am and  Novolog 65 units with meals with correction insulin ( 2 unit/50 for BG > 150 ).  She does not tolerate Metformin due to GI distress.  Her creat is 0.96  with GFR 63 mL/min in Jan 2017.  She is to check her blood sugar fasting each am and check her blood sugar prelunch and predinner daily.  She was seen by [facility] in May 2016 without retinopathy. Pricilla has an appointment with [facility] in a few weeks.  Pt received the flu vaccine this season.    2.  HTN: Continue current RXs.    3.  CARDIOMYOPATHY/A FIB:  Pt followed here by Cardiology staff.      4. GRAVE'S DISEASE:  Hx of Grave's disease s/p I 131 in Jan 2013.    Pt became hypothyroid following above treatment and is taking Levothyroxine 200 mcg po daily.  Pt's TSH is 7.15 mU/L in Jan 2018.  Pt instructed to increase her Levothyroxine 200 mcg 1 pill Monday - Saturday and 2 pills on Sundays.  Will recheck her TSH and FT4 when I see her in 8 weeks.    5.  OBESITY:  Morbid obesity with BMI > 70 kg/m2.  She [facility] staff.    6.  Return to Endocrine Clinic in 8 weeks.   Check A1C and also recheck TSH/FT4.

## 2018-02-28 NOTE — TELEPHONE ENCOUNTER
Pricilla is on high  doses of the Toujeo 300 units per Ml  Basaglar and levemir are 100 units per ML  and had used Lantus at the same strength that was ineffective . They  are not powerful  enough  to control her  BS  Toujeo  is needed at 170 units daily  Already to keep her A1c  Down  At 8.9 % was at 11.8% and higher before Toujeo. Has used U 500 insulin    She is  Morbidly obese with a BMI of 71 basaglar, levemir and lantus  not effective making Toujeo medically necessary

## 2018-02-28 NOTE — PROGRESS NOTES
ANTICOAGULATION FOLLOW-UP CLINIC VISIT    Patient Name:  Pricilla Brown  Date:  2/28/2018  Contact Type:  Telephone    SUBJECTIVE:     Patient Findings     Positives Missed doses           OBJECTIVE    INR   Date Value Ref Range Status   02/28/2018 1.62 (H) 0.86 - 1.14 Final     Chromogenic Factor 10   Date Value Ref Range Status   04/08/2017 19 (L) 70 - 130 % Final     Comment:     Therapeutic Range:  A Chromogenic Factor 10 level of approximately 20-40%   inversely correlates with an INR of 2-3 for patients receiving Warfarin.   Chromogenic Factor 10 levels below 20% indicate an INR greater than 3 and   levels above 40% indicate an INR less than 2.         ASSESSMENT / PLAN  INR assessment SUB    Recheck INR In: 2 WEEKS    INR Location Clinic      Anticoagulation Summary as of 2/28/2018     INR goal 2.0-2.5   Today's INR 1.62!   Maintenance plan 20 mg (5 mg x 4) on Mon, Fri; 15 mg (5 mg x 3) all other days   Full instructions 2/28: 20 mg; Otherwise 20 mg on Mon, Fri; 15 mg all other days   Weekly total 115 mg   Plan last modified Velvet Asencio RN (3/15/2017)   Next INR check 3/15/2018   Priority INR   Target end date Indefinite    Indications   Atrial fibrillation (H) [I48.91] [I48.91]  Long-term (current) use of anticoagulants [Z79.01] [Z79.01]         Anticoagulation Episode Summary     INR check location Clinic Lab    Preferred lab     Send INR reminders to [facility]    Comments Contact Patient at [phone]      Anticoagulation Care Providers     Provider Role Specialty Phone number    Percy Alcala MD  Cardiology [phone]            See the Encounter Report to view Anticoagulation Flowsheet and Dosing Calendar (Go to Encounters tab in chart review, and find the Anticoagulation Therapy Visit)    Spoke with Pricilla.    Alecia Jacobson RN             4/11/18 ADDENDUM  Patient scheduled for 4/12 lab draw. Updated anticoagulation tracking flowsheet.    Luis Daniel Burgos RN

## 2018-02-28 NOTE — TELEPHONE ENCOUNTER
Per insurance- Basaglar, Levemir and Tresiba are tier 2 covered formulary alternatives that are Food and Drug Administration (FDA) labeled and proven to lower blood glucose levels as effectively as Toujeo with copays ranging from $10-$19 (30 day) and $25-$47.50 (90 day). If a formulary exception would be approved, Toujeo would pay at a higher non-formulary tier 4 copay. Will ask practitioner if she wishes to change medication or pursue higher cost Toujeo.

## 2018-02-28 NOTE — NURSING NOTE
"Chief Complaint   Patient presents with     RECHECK     DIABETES TYPE 2       Initial There were no vitals taken for this visit. Estimated body mass index is 70.64 kg/(m^2) as calculated from the following:    Height as of 12/12/17: 1.702 m (5' 7\").    Weight as of 2/7/18: 204.6 kg (451 lb).  Medication Reconciliation: complete    "

## 2018-03-01 NOTE — TELEPHONE ENCOUNTER
Central Prior Authorization Team   Phone: [phone]      PA Initiation    Medication: Toujeo 300u/ML  Insurance Company: [employer] - Phone [phone] Fax [phone]  Pharmacy Filling the Rx: [facility] [place], [place] - [address] [place] [address] [phone]  Filling Pharmacy Phone: [phone]  Filling Pharmacy Fax:    Start Date: 3/1/2018

## 2018-03-05 NOTE — TELEPHONE ENCOUNTER
Prior Authorization Approval    Authorization Effective Date: 1/1/2018  Authorization Expiration Date: 3/1/2019  Medication: Toujeo 300u/ML-[place] approved  Approved Dose/Quantity:   Reference #:     Insurance Company: [employer] - Phone [phone] Fax [phone]  Expected CoPay: $3.70     CoPay Card Available:      Foundation Assistance Needed:    Which Pharmacy is filling the prescription (Not needed for infusion/clinic administered): [facility] [address] [id number]  Pharmacy Notified: Yes  Patient Notified: No

## 2018-03-20 ENCOUNTER — OFFICE VISIT (OUTPATIENT)
Dept: SLEEP MEDICINE | Facility: CLINIC | Age: 43
End: 2018-03-20
Payer: MEDICARE

## 2018-03-20 VITALS
HEART RATE: 92 BPM | WEIGHT: 293 LBS | DIASTOLIC BLOOD PRESSURE: 80 MMHG | OXYGEN SATURATION: 100 % | HEIGHT: 67 IN | BODY MASS INDEX: 45.99 KG/M2 | RESPIRATION RATE: 18 BRPM | SYSTOLIC BLOOD PRESSURE: 124 MMHG

## 2018-03-20 DIAGNOSIS — G47.33 OSA (OBSTRUCTIVE SLEEP APNEA): Primary | ICD-10-CM

## 2018-03-20 DIAGNOSIS — G47.8 UNHEALTHY SLEEP HABIT: ICD-10-CM

## 2018-03-20 PROCEDURE — 99214 OFFICE O/P EST MOD 30 MIN: CPT | Performed by: NURSE PRACTITIONER

## 2018-03-20 NOTE — MR AVS SNAPSHOT
After Visit Summary   3/20/2018    Pricilla Brown    MRN: [id number]           Patient Information     Date Of Birth          1975        Visit Information        Provider Department      3/20/2018 1:00 PM Alecia Ramirez APRN [facility], [place], Sleep Study        Care Instructions    Check are you do for a new machine  I'll have fvhme check when due for new seal  Try putting the mask on the pillow to  all night use.  Enter bed closer to bedtime  Keep naps short  Update PCP or cardiology re: fatigue  Follow up in 3 months  Your BMI is Body mass index is 72.05 kg/(m^2).  Weight management is a personal decision.  If you are interested in exploring weight loss strategies, the following discussion covers the approaches that may be successful. Body mass index (BMI) is one way to tell whether you are at a healthy weight, overweight, or obese. It measures your weight in relation to your height.  A BMI of 18.5 to 24.9 is in the healthy range. A person with a BMI of 25 to 29.9 is considered overweight, and someone with a BMI of 30 or greater is considered obese. More than two-thirds of American adults are considered overweight or obese.  Being overweight or obese increases the risk for further weight gain. Excess weight may lead to heart disease and diabetes.  Creating and following plans for healthy eating and physical activity may help you improve your health.  Weight control is part of healthy lifestyle and includes exercise, emotional health, and healthy eating habits. Careful eating habits lifelong are the mainstay of weight control. Though there are significant health benefits from weight loss, long-term weight loss with diet alone may be very difficult to achieve- studies show long-term success with dietary management in less than 10% of people. Attaining a healthy weight may be especially difficult to achieve in those with severe obesity. In some cases, medications, devices and  surgical management might be considered.  What can you do?  If you are overweight or obese and are interested in methods for weight loss, you should discuss this with your provider.     Consider reducing daily calorie intake by 500 calories.     Keep a food journal.     Avoiding skipping meals, consider cutting portions instead.    Diet combined with exercise helps maintain muscle while optimizing fat loss. Strength training is particularly important for building and maintaining muscle mass. Exercise helps reduce stress, increase energy, and improves fitness. Increasing exercise without diet control, however, may not burn enough calories to loose weight.       Start walking three days a week 10-20 minutes at a time    Work towards walking thirty minutes five days a week     Eventually, increase the speed of your walking for 1-2 minutes at time    In addition, we recommend that you review healthy lifestyles and methods for weight loss available through the National Institutes of Health patient information sites:  http://win.niddk.nih.gov/publications/index.htm    And look into health and wellness programs that may be available through your health insurance provider, employer, local community center, or james [employer].    Weight management plan: Patient was referred to their PCP to discuss a diet and exercise plan.              Follow-ups after your visit        Follow-up notes from your care team     Return in about 3 months (around 6/20/2018).      Your next 10 appointments already scheduled     Mar 29, 2018 11:00 AM CDT   (Arrive by 10:45 AM)   NUTRITION VISIT with Velvet Mcallister RD   [facility] Surgical Weight Management ([facility])    [address] [place] [address]  4th [facility] [address] [phone]            Apr 12, 2018 10:15 AM CDT   LAB with [facility] LAB   [facility] Lab ([facility])    [address] [place] [address] [phone]            Please do not eat 10-12 hours before your appointment if you are coming in fasting for labs on lipids, cholesterol, or glucose (sugar). This does not apply to pregnant women. Water, hot tea and black coffee (with nothing added) are okay. Do not drink other fluids, diet soda or chew gum.            Apr 12, 2018 11:00 AM CDT   (Arrive by 10:45 AM)   RETURN DIABETES with Isela Archibald PA-C   [facility]ology ([facility])    [address] [place] [address]  3rd [facility] [address]   [phone]            Apr 16, 2018 11:40 AM CDT   (Arrive by 11:25 AM)   Return Visit with Sonal Ford MD   [facility] Weight Management ([facility])    [address] [place] [address]   [phone]            May 01, 2018  9:00 AM CDT   LAB with  LAB   [facility] Lab ([facility])    [address] [place] [id number] [phone]           Please do not eat 10-12 hours before your appointment if you are coming in fasting for labs on lipids, cholesterol, or glucose (sugar). This does not apply to pregnant women. Water, hot tea and black coffee (with nothing added) are okay. Do not drink other fluids, diet soda or chew gum.            May 01, 2018 10:00 AM CDT   (Arrive by 9:45 AM)   Return Visit with Silva Damon MD   [facility] Management ([facility])    [address] [place] [address]   [phone]            May 29, 2018  8:30 AM CDT   (Arrive by 8:15 AM)   RETURN HEART FAILURE with Percy Alcala MD   [facility] ([facility])    [address] [place] [address]   [phone]            Jun 19, 2018  1:30 PM CDT   Return Sleep Patient with YOCASTA Horan [facility] [place], Sleep Study ([facility]  "[facility])    [address] [place] [address] [phone]              Who to contact     If you have questions or need follow up information about today's clinic visit or your schedule please [facility] directly at [phone].  Normal or non-critical lab and imaging results will be communicated to you by MyChart, letter or phone within 4 business days after the clinic has received the results. If you do not hear from us within 7 days, please contact the clinic through [facility] or phone. If you have a critical or abnormal lab result, we will notify you by phone as soon as possible.  Submit refill requests through [facility] or call your pharmacy and they will forward the refill request to us. Please allow 3 business days for your refill to be completed.          Additional Information About Your Visit        [facility] Information     [facility] gives you secure access to your electronic health record. If you see a primary care provider, you can also send messages to your care team and make appointments. If you have questions, please call your primary care clinic.  If you do not have a primary care provider, please call [phone] and they will assist you.        Care EveryWhere ID     This is your Care EveryWhere ID. This could be used by other organizations to access your [facility] medical records  [id number]        Your Vitals Were     Pulse Respirations Height Pulse Oximetry BMI (Body Mass Index)       92 18 1.702 m (5' 7\") 100% 72.05 kg/m2        Blood Pressure from Last 3 Encounters:   03/20/18 124/80   02/28/18 132/83   02/07/18 140/88    Weight from Last 3 Encounters:   03/20/18 (!) 208.7 kg (460 lb)   02/28/18 (!) 209.6 kg (462 lb)   02/07/18 (!) 204.6 kg (451 lb)              Today, you had the following     No orders found for display       Primary Care Provider Office Phone # Fax #    Jamilah Bernal [phone] " [address] [address]        Equal Access to Services     KIERSTEN DRIVER : Hadii aad ku hadjitendralaura Angelicarosmery, wagermaniada blanegeovannyha, qaalyssadina montalvogregorymk shah, bola bedollaashleemaggi walker. So [facility] [phone].    ATENCIÓN: Si habla español, tiene a de la rosa disposición servicios gratuitos de asistencia lingüística. [facility] [phone].    We comply with applicable federal civil rights laws and [place] laws. We do not discriminate on the basis of race, color, national origin, age, disability, sex, sexual orientation, or gender identity.            Thank you!     Thank you for choosing [facility] SLEEP [facility]  for your care. Our goal is always to provide you with excellent care. Hearing back from our patients is one way we can continue to improve our services. Please take a few minutes to complete the written survey that you may receive in the mail after your visit with us. Thank you!             Your Updated Medication List - Protect others around you: Learn how to safely use, store and throw away your medicines at www.disposemymeds.org.          This list is accurate as of 3/20/18  2:01 PM.  Always use your most recent med list.                   Brand Name Dispense Instructions for use Diagnosis    acetaminophen 325 MG tablet    TYLENOL    180 tablet    Take 2 tablets (650 mg) by mouth 3 times daily as needed for mild pain or fever (total acetaminophen dose should not exceed 3000 mg per day)    Neuropathic pain of lower extremity, unspecified laterality       albuterol 108 (90 BASE) MCG/ACT Inhaler    PROAIR HFA/PROVENTIL HFA/VENTOLIN HFA    1 Inhaler    Inhale 2 puffs into the lungs every 6 hours as needed.    Reactive airway disease       bisacodyl 10 MG Suppository    DULCOLAX    6 suppository    Place 1 suppository (10 mg) rectally daily as needed for constipation    Constipation       blood glucose lancets standard    no brand specified    200 each    Use to test blood sugar 5 times  daily or as directed.    Diabetes mellitus, type 2 (H)       * blood glucose monitoring lancets     500 each    Use to test blood sugar 5 times daily or as directed.    Type 2 diabetes mellitus with hyperglycemia, with long-term current use of insulin (H)       * blood glucose monitoring lancets     360 each    Use to test blood sugar 3 times daily  With meter/ strips/ lancets covered by insurance    Type 2 diabetes mellitus (H)       * blood glucose monitoring meter device kit     1 kit    Use to test blood sugars 2 times daily or as directed.    Type II or unspecified type diabetes mellitus without mention of complication, not stated as uncontrolled       * blood glucose monitoring meter device kit    no brand specified    1 kit    Use to test blood sugar 4 times daily  With meter/ strips/ lancets covered by insurance    Type 2 diabetes mellitus (H)       * iB star W/DEVICE Kit     1 kit    -PLEASE GIVE PATIENT A DEVICE HER INSURANCE WILL COVER-    Type 2 diabetes mellitus with hyperglycemia, with long-term current use of insulin (H)       * blood glucose monitoring meter device kit    no brand specified    1 kit    Use to test blood sugar 3 times daily or as directed.    Diabetes mellitus, type 2 (H)       * blood glucose monitoring test strip    ACCU-CHEK SMARTVIEW    500 each    Use to test blood sugar 5 times daily    Type 2 diabetes mellitus with hyperglycemia, with long-term current use of insulin (H)       * blood glucose monitoring test strip    IGLUCOSE TEST STRIPS    360 strip    Use to test blood sugar 4 times daily  With meter/ strips lancets covered by insurance    Type 2 diabetes mellitus (H)       * blood glucose monitoring test strip    no brand specified    100 strip    Use to test blood sugar 3-4 times daily or as directed with insurance covered strips    Type 2 diabetes mellitus with hyperglycemia, with long-term current use of insulin (H)       * blood glucose monitoring test strip    no brand  specified    100 strip    Use to test blood sugars 3 times daily or as directed    Diabetes mellitus, type 2 (H)       buPROPion 100 MG 12 hr tablet    WELLBUTRIN SR    180 tablet    Take 1 tablet (100 mg) by mouth 2 times daily    Tobacco abuse       capsaicin 0.025 % Crea cream    ZOSTRIX    1 Tube    Apply 1 g topically 3 times daily as needed    Dermatophytosis of nail       ciclopirox 8 % Soln     1 Bottle    Externally apply topically daily To toenails.    Dermatophytosis of nail       clotrimazole 1 % cream    LOTRIMIN    30 g    Apply topically 2 times daily    Tinea pedis of right foot       colchicine 0.6 MG tablet     12 tablet    Take 1-2 tablets (0.6-1.2 mg) by mouth daily as needed for moderate pain    Acute gouty arthritis       diclofenac 1 % Gel topical gel    VOLTAREN    100 g    Apply 4 grams to knees or 2 grams to hands four times daily using enclosed dosing card.    Left foot pain       dulaglutide 1.5 MG/0.5ML pen    TRULICITY    45 mL    Inject 1.5 mg Subcutaneous every 7 days    Type 2 diabetes mellitus with hyperglycemia, with long-term current use of insulin (H)       DULoxetine 20 MG EC capsule    CYMBALTA    60 capsule    Take 1 capsule (20 mg) by mouth 2 times daily    Depression       Efinaconazole 10 % Soln     8 mL    Externally apply topically daily To toenails.    Dermatophytosis of nail       gabapentin 300 MG capsule    NEURONTIN    120 capsule    Take 2 capsules (600 mg) by mouth 2 times daily Needs PCP appt for further refills    Type 2 diabetes mellitus with hyperglycemia, with long-term current use of insulin (H)       hydrALAZINE 25 MG tablet    APRESOLINE    450 tablet    Take 1 tab (25 mg) in am, 2 tabs (50 mg) midday, and 2 tabs (50 mg) in pm daily    Dilated cardiomyopathy (H)       insulin glargine U-300 300 UNIT/ML injection    TOUJEO    30 mL    Inject 170 units SQ each am.    Type 2 diabetes mellitus with hyperglycemia, with long-term current use of insulin (H)        insulin pen needle 32G X 4 MM    BD RIVER U/F    550 each    Use 6 daily or as directed.    Diabetes mellitus, type 2 (H)       levothyroxine 200 MCG tablet    SYNTHROID/LEVOTHROID    102 tablet    Take 1 tab Monday thru Saturday and 2 tabs on Sundays.    Other specified hypothyroidism       metoprolol succinate 50 MG 24 hr tablet    TOPROL-XL    90 tablet    Take 0.5 tablets (25 mg) by mouth At Bedtime    Chronic diastolic heart failure (H)       morphine 15 MG 12 hr tablet    MS CONTIN     Take 15 mg by mouth daily as needed        Nebulizer Compressor Kit     1 kit    1 Device 4 times daily as needed.    COPD (chronic obstructive pulmonary disease) (H)       nicotine 14 MG/24HR 24 hr patch    [employer] NICOTINE    30 patch    Place 1 patch onto the skin every 24 hours    Tobacco abuse       NovoLOG FLEXPEN 100 UNIT/ML injection   Generic drug:  insulin aspart     60 mL    Inject 65 units with meals plus correction. Pt uses approx 190 units in 24 hrs.    Type 2 diabetes mellitus with hyperglycemia, with long-term current use of insulin (H)       nystatin cream    MYCOSTATIN    90 g    Apply topically 2 times daily To toenails    Dermatophytosis of nail       order for DME     1 each    Use your CPAP device as directed by your provider. Pressure change to min 13 max 18cwp        * order for DME     1 each    Equipment being ordered: Challenger Wide walker if available - patient needs seat, basket and brakes.    Dilated cardiomyopathy (H), Chronic systolic heart failure (H)       * order for DME     1 each    Equipment being ordered:  2470-2329 $92  Plantar, fasciitis, LG, night splint    Dermatophytosis of nail, Plantar fasciitis of right foot, Diabetic neuropathy with neurologic complication (H), Peroneal tendinitis, right       * order for DME     1 Units    Left foot    Left foot pain, Diabetic neuropathy with neurologic complication (H)       oxyCODONE-acetaminophen 5-325 MG per tablet    PERCOCET    60 tablet     Take 1 tablet by mouth every 8 hours as needed for moderate to severe pain (try to limit use, no further prescriptions until seen in pain clinic)    Lumbago, Bilateral low back pain with sciatica, sciatica laterality unspecified       polyethylene glycol powder    MIRALAX/GLYCOLAX          potassium chloride SA 20 MEQ CR tablet    KLOR-CON    240 tablet    Take 2 tablets (40 mEq) by mouth 4 times daily    Hypokalemia       senna 8.6 MG tablet    SENOKOT    45 tablet    Take 1 tablet by mouth 2 times daily    Constipation       spironolactone 50 MG tablet    ALDACTONE    30 tablet    Take 1 tablet (50 mg) by mouth daily        tiotropium 18 MCG capsule    SPIRIVA HANDIHALER    30 capsule    Inhale contents of one capsule daily.    COPD (chronic obstructive pulmonary disease) (H)       topiramate 25 MG tablet    TOPAMAX    180 tablet    Take 3 tablets (75 mg) by mouth 2 times daily    Morbid obesity, unspecified obesity type (H)       torsemide 100 MG tablet    DEMADEX    180 tablet    Take 1 tablet (100 mg) by mouth 2 times daily    Chronic diastolic heart failure (H)       warfarin 5 MG tablet    COUMADIN    100 tablet    Take 20 mg (four tabs) M and F and 15 mg (three tabs) all other days of the week or as directed by the Coumadin Clinic.    Long term current use of anticoagulant therapy       * Notice:  This list has 13 medication(s) that are the same as other medications prescribed for you. Read the directions carefully, and ask your doctor or other care provider to review them with you.

## 2018-03-20 NOTE — NURSING NOTE
"Chief Complaint   Patient presents with     CPAP Follow Up       Initial /80  Pulse 92  Resp 18  Ht 1.702 m (5' 7\")  Wt (!) 208.7 kg (460 lb)  SpO2 100%  BMI 72.05 kg/m2 Estimated body mass index is 72.05 kg/(m^2) as calculated from the following:    Height as of this encounter: 1.702 m (5' 7\").    Weight as of this encounter: 208.7 kg (460 lb).  Medication Reconciliation: complete   So [facility]      "

## 2018-03-20 NOTE — PROGRESS NOTES
Cc: Patient returns here today in follow up: clearance for weight loss surgery, wants a new machine,     HPI:HISTORY OF PRESENT ILLNESS:  History of severe obstructive sleep apnea with a polysomnogram, 03/2013, showing an AHI of 68.9.  Time less than 89% at 3.1 minutes.   Largest concern in past has been missed opportunities.  Overnight oximetry done on 05/23/2016 showed O2 sats less than 89%, 48 seconds. Wt 468#.     3/17 visit:Use of APAP 29/30 days.  Average usage on days used 8 hours and 53 minutes, 90th percentile pressure 15.3 cm of water.Residual AHI is 0.9.  Daily usage shows some pancaking while she was sick with an upper respiratory as well as watching videos at nights and having CPAP on just in case she would fall asleep.     New concerns:struggling for air, woke up 2x/1 night, other time 1x/night, uncertain if cpap is on with this recall of history, worsening of daytime fatigue with poor compiance    Sleep Review of Systems:     Snoring, snort arousals, gasping while on therapy: yes     Bedpartner c/o's of cpap: whistle     Heated humidity problems with rainout/excessive dryness, sore throat: dry,      Opening of mouth while on therapy/excessive leak (full face)     Swallowing air: n/a     Skin problems: no     Insufficient sleep, devoting <7 or more hours to sleep: yes     Problems falling or staying asleep with cpap: no     RLS: no    SLEEP SCHEDULE:  Bedtime: (get into bed: by 10 or 11) 11 usually time to sleep, if entering bed prior may miss use for entire night  Sleep Latency: difficult to detrmine  Wakeups:torsemide, up for nocturia 2-3x/night-difficult to  verify that cpap was on with night with frequent nocturia  Refall quick  Final wakeup: 11 or 12A-this is recent with worsening sleepiness/fatigue    Naps: 3-5 pm-on occasion    Response to therapy: better quality on cpap  Willingness to continue: yes, wants new machine    Equipment issues: mask cusion wears out too fast    Download today:  apap 14-20 cm h20, 6 days no use of cpap in past 30 days, 6/30 days with usage < 4 hrs, 90thpecentile pressure 16.6 cm h20, 1.0 OA index, 3.5 hypopnea index, residual AHI 4.5, 53.3% of past 30 days with usage >=4 hrs. Time in large leak 2:02, evidence of pancaking and obvious times when no use is apparent. Snore index: 14.3 on 3/20/18.     Full report scanned into medical record:  Allergies:    Allergies   Allergen Reactions     Penicillins Other (See Comments)     CHILDHOOD ALLERGY     Ibuprofen Sodium Hives and Rash       Medications:    Current Outpatient Prescriptions   Medication Sig Dispense Refill     insulin glargine U-300 (TOUJEO) 300 UNIT/ML injection Inject 170 units SQ each am. 30 mL 3     NOVOLOG FLEXPEN 100 UNIT/ML soln Inject 65 units with meals plus correction. Pt uses approx 190 units in 24 hrs. 60 mL 3     dulaglutide (TRULICITY) 1.5 MG/0.5ML pen Inject 1.5 mg Subcutaneous every 7 days 45 mL 3     gabapentin (NEURONTIN) 300 MG capsule Take 2 capsules (600 mg) by mouth 2 times daily Needs PCP appt for further refills 120 capsule 0     blood glucose (NO BRAND SPECIFIED) lancets standard Use to test blood sugar 5 times daily or as directed. 200 each 11     Blood Glucose Monitoring Suppl (IBG STAR) W/DEVICE KIT -PLEASE GIVE PATIENT A DEVICE HER INSURANCE WILL COVER- 1 kit 0     blood glucose monitoring (NO BRAND SPECIFIED) test strip Use to test blood sugar 3-4 times daily or as directed with insurance covered strips 100 strip 3     levothyroxine (SYNTHROID/LEVOTHROID) 200 MCG tablet Take 1 tab Monday thru Saturday and 2 tabs on Sundays. 102 tablet 3     blood glucose monitoring (NO BRAND SPECIFIED) test strip Use to test blood sugars 3 times daily or as directed 100 strip 11     blood glucose monitoring (ULTRA THIN 30G) lancets Use to test blood sugar 3 times daily  With meter/ strips/ lancets covered by insurance 360 each 3     blood glucose monitoring (NO BRAND SPECIFIED) meter device kit Use to  test blood sugar 3 times daily or as directed. 1 kit 1     blood glucose monitoring (NO BRAND SPECIFIED) meter device kit Use to test blood sugar 4 times daily  With meter/ strips/ lancets covered by insurance 1 kit 0     blood glucose monitoring (IGLUCOSE TEST STRIPS) test strip Use to test blood sugar 4 times daily  With meter/ strips lancets covered by insurance 360 strip 3     buPROPion (WELLBUTRIN SR) 100 MG 12 hr tablet Take 1 tablet (100 mg) by mouth 2 times daily 180 tablet 1     blood glucose monitoring (ACCU-CHEK SMARTVIEW) test strip Use to test blood sugar 5 times daily 500 each 3     blood glucose monitoring (ACCU-CHEK FASTCLIX) lancets Use to test blood sugar 5 times daily or as directed. 500 each 3     torsemide (DEMADEX) 100 MG tablet Take 1 tablet (100 mg) by mouth 2 times daily 180 tablet 1     topiramate (TOPAMAX) 25 MG tablet Take 3 tablets (75 mg) by mouth 2 times daily 180 tablet 0     colchicine 0.6 MG tablet Take 1-2 tablets (0.6-1.2 mg) by mouth daily as needed for moderate pain 12 tablet 1     hydrALAZINE (APRESOLINE) 25 MG tablet Take 1 tab (25 mg) in am, 2 tabs (50 mg) midday, and 2 tabs (50 mg) in pm daily 450 tablet 3     warfarin (COUMADIN) 5 MG tablet Take 20 mg (four tabs) M and F and 15 mg (three tabs) all other days of the week or as directed by the [facility] Clinic. 100 tablet 6     potassium chloride SA (POTASSIUM CHLORIDE) 20 MEQ CR tablet Take 2 tablets (40 mEq) by mouth 4 times daily 240 tablet 3     insulin pen needle (BD RIVER U/F) 32G X 4 MM Use 6 daily or as directed. 550 each 3     diclofenac (VOLTAREN) 1 % GEL topical gel Apply 4 grams to knees or 2 grams to hands four times daily using enclosed dosing card. 100 g 1     clotrimazole (LOTRIMIN) 1 % cream Apply topically 2 times daily 30 g 1     ciclopirox 8 % SOLN Externally apply topically daily To toenails. 1 Bottle 11     Efinaconazole 10 % SOLN Externally apply topically daily To toenails. 8 mL 11     acetaminophen  (TYLENOL) 325 MG tablet Take 2 tablets (650 mg) by mouth 3 times daily as needed for mild pain or fever (total acetaminophen dose should not exceed 3000 mg per day) 180 tablet 5     polyethylene glycol (MIRALAX/GLYCOLAX) powder        morphine (MS CONTIN) 15 MG 12 hr tablet Take 15 mg by mouth daily as needed       order for DME Left foot 1 Units 0     metoprolol (TOPROL-XL) 50 MG 24 hr tablet Take 0.5 tablets (25 mg) by mouth At Bedtime 90 tablet 3     order for DME Equipment being ordered:  4336-4560 $92   Plantar, fasciitis, LG, night splint 1 each 0     capsaicin (ZOSTRIX) 0.025 % CREA Apply 1 g topically 3 times daily as needed 1 Tube 0     order for DME Equipment being ordered: Challenger Wide walker if available - patient needs seat, basket and brakes. 1 each 0     nicotine ([employer] NICOTINE) 14 MG/24HR patch 2h hr Place 1 patch onto the skin every 24 hours 30 patch 1     nystatin (MYCOSTATIN) cream Apply topically 2 times daily To toenails 90 g 6     spironolactone (ALDACTONE) 50 MG tablet Take 1 tablet (50 mg) by mouth daily 30 tablet 11     oxyCODONE-acetaminophen (PERCOCET) 5-325 MG per tablet Take 1 tablet by mouth every 8 hours as needed for moderate to severe pain (try to limit use, no further prescriptions until seen in pain clinic) 60 tablet 0     DULoxetine (CYMBALTA) 20 MG capsule Take 1 capsule (20 mg) by mouth 2 times daily 60 capsule 0     senna (SENOKOT) 8.6 MG tablet Take 1 tablet by mouth 2 times daily 45 tablet 0     bisacodyl (DULCOLAX) 10 MG suppository Place 1 suppository (10 mg) rectally daily as needed for constipation 6 suppository 0     Blood Glucose Monitoring Suppl (ACCU-CHEK RON PLUS) W/DEVICE KIT Use to test blood sugars 2 times daily or as directed. 1 kit 0     tiotropium (SPIRIVA HANDIHALER) 18 MCG inhalation capsule Inhale contents of one capsule daily. 30 capsule 1     Respiratory Therapy Supplies (NEBULIZER COMPRESSOR) KIT 1 Device 4 times daily as needed. 1 kit 3      ORDER FOR DME Use your CPAP device as directed by your provider. Pressure change to min 13 max 18cwp 1 each 99     albuterol (PROAIR HFA, PROVENTIL HFA, VENTOLIN HFA) 108 (90 BASE) MCG/ACT inhaler Inhale 2 puffs into the lungs every 6 hours as needed. 1 Inhaler 11       Problem List:  Patient Active Problem List    Diagnosis Date Noted     Foot pain 04/06/2017     Priority: Medium     Chest pain 02/01/2017     Priority: Medium     Cellulitis 10/14/2016     Priority: Medium     Atrial fibrillation (H) [I48.91] 06/06/2016     Priority: Medium     Long-term (current) use of anticoagulants [Z79.01] 06/06/2016     Priority: Medium     Plantar fasciitis 09/07/2015     Priority: Medium     Neuropathic pain of lower extremity 09/07/2015     Priority: Medium     Peritonsillar abscess 09/30/2014     Priority: Medium     Asthma 07/16/2013     Priority: Medium     Problem list name updated by automated process. Provider to review       Azotemia 04/30/2013     Priority: Medium     SOB (shortness of breath) 03/25/2013     Priority: Medium     Hypothyroidism following radioiodine therapy 03/07/2013     Priority: Medium     JYOTI (obstructive sleep apnea) CPAP Min Pressure of 13 and Max Pressure of 18 03/05/2013     Priority: Medium     3/4/13 PSG - AHI 62, started on APAP 13-18 with FFM       Chronic diastolic heart failure (H) 02/15/2013     Priority: Medium     CORE Pt  EDW is about 437#                 Chronic anticoagulation for a-fib 02/15/2013     Priority: Medium     INR's followed by coumadin clinic at        Morbid obesity (H) 01/30/2013     Priority: Medium     Diabetes mellitus, type 2 (H) 01/30/2013     Priority: Medium     Dilated cardiomyopathy (H) 01/08/2013     Priority: Medium     Right Heart Procedure Note:  July 20, 2015  Indication: assessment of the filling pressures   Procedure   Access: 7 Fr sheath without complications in the right IJ  Findings   RA: 9/10/6  RV 30/7  PA 30/15/22  PCWP 16/16/14  Catarina CO 4.95  "(1.69) TD CO 7.3 (2.5)   TPR 4.45 PVR 1.62  PaSat 59.3 Hb 13.4  Impression   - Normal RV and LV filling pressures.   - Cardiomyopathy       Right Heart Cath: 5/1/13  RHC, acute exacerbation of HF was ruled out yesterday. (RA 11, PA s29 ED12, PA 17 and PCWP 14.) Since last RHC in [place] the Cardiac index (Catarina) is up from 1.6 to1.9.  ECHO: 3/26/2013  Interpretation Summary  Moderate left ventricular dilation is present. The Ejection Fraction is   estimated at 40-45%. Pulmonary artery systolic pressure cannot be assessed.   The inferior vena cava is normal. Technically difficult study. Poor acoustic   windows.       Chronic atrial fibrillation (HCC) 01/05/2013     Priority: Medium        Past Medical/Surgical History:  Past Medical History:   Diagnosis Date     A-fib (H) 2011    on coumadin since 1/13     Asthma     as a kid     Chest pain 2/1/2017     Chronic anticoagulation for a-fib 2/15/2013    INR's followed by coumadin clinic at      Diabetes mellitus (H) 2012     Diastolic heart failure 2/15/2013     Dilated cardiomyopathy (H) 1/8/2013     HTN (hypertension)      Hyperthyroidism     [place], s/p I131 1/13, now on prednisone and methimazole     Morbid obesity (H)      Pulmonary embolism (H) 1/12    hospitalized in [place], on lovenox/coumadin for a few months but stopped, hypercoag w/u neg per pt     Sleep apnea     using CPAP     History reviewed. No pertinent surgical history.        Physical Examination:  Vitals: /80  Pulse 92  Resp 18  Ht 1.702 m (5' 7\")  Wt (!) 208.7 kg (460 lb)  SpO2 100%  BMI 72.05 kg/m2  BMI= Body mass index is 72.05 kg/(m^2).      [place] Total Score 3/20/2018   Total score - [place] 13       GENERAL APPEARANCE: alert and no distress    A/P: cpap works well with her severe debra, some breakthrough apnea with irregular use of cpap as in the past.      1. DEBRA: recommended she call insurance to see when she is able to get a new machine, ideally would like to see usage improve and " sleepiness resolved behavioral measures, Will ask pt via my chart as to date of last mask, cushion and supplies and fvHme to contact her re: cushion replacement frequency-could this be a factor of old supplies? Snore index is up.  No download was available in 2017, but compared to 2016, some of the same issues were available with download.    2. Obesity:up and down, not ready for surgery  3. Sleep habits: missed opportunities, equipment failure      Time spent with patient is 25 minutes, of which >50% was spent in counseling, education and coordination of care.

## 2018-03-20 NOTE — PATIENT INSTRUCTIONS
Check are you do for a new machine  I'll have fvhme check when due for new seal  Try putting the mask on the pillow to  all night use.  Enter bed closer to bedtime  Keep naps short  Update PCP or cardiology re: fatigue  Follow up in 3 months  Your BMI is Body mass index is 72.05 kg/(m^2).  Weight management is a personal decision.  If you are interested in exploring weight loss strategies, the following discussion covers the approaches that may be successful. Body mass index (BMI) is one way to tell whether you are at a healthy weight, overweight, or obese. It measures your weight in relation to your height.  A BMI of 18.5 to 24.9 is in the healthy range. A person with a BMI of 25 to 29.9 is considered overweight, and someone with a BMI of 30 or greater is considered obese. More than two-thirds of American adults are considered overweight or obese.  Being overweight or obese increases the risk for further weight gain. Excess weight may lead to heart disease and diabetes.  Creating and following plans for healthy eating and physical activity may help you improve your health.  Weight control is part of healthy lifestyle and includes exercise, emotional health, and healthy eating habits. Careful eating habits lifelong are the mainstay of weight control. Though there are significant health benefits from weight loss, long-term weight loss with diet alone may be very difficult to achieve- studies show long-term success with dietary management in less than 10% of people. Attaining a healthy weight may be especially difficult to achieve in those with severe obesity. In some cases, medications, devices and surgical management might be considered.  What can you do?  If you are overweight or obese and are interested in methods for weight loss, you should discuss this with your provider.     Consider reducing daily calorie intake by 500 calories.     Keep a food journal.     Avoiding skipping meals, consider cutting  portions instead.    Diet combined with exercise helps maintain muscle while optimizing fat loss. Strength training is particularly important for building and maintaining muscle mass. Exercise helps reduce stress, increase energy, and improves fitness. Increasing exercise without diet control, however, may not burn enough calories to loose weight.       Start walking three days a week 10-20 minutes at a time    Work towards walking thirty minutes five days a week     Eventually, increase the speed of your walking for 1-2 minutes at time    In addition, we recommend that you review healthy lifestyles and methods for weight loss available through the National Institutes of Health patient information sites:  http://win.niddk.nih.gov/publications/index.htm    And look into health and wellness programs that may be available through your health insurance provider, employer, local community center, or james club.    Weight management plan: Patient was referred to their PCP to discuss a diet and exercise plan.

## 2018-03-23 NOTE — NURSING NOTE
Orders sent to [employer] [facility] for cpap   [facility] ~[place]

## 2018-03-29 ENCOUNTER — ALLIED HEALTH/NURSE VISIT (OUTPATIENT)
Dept: SURGERY | Facility: CLINIC | Age: 43
End: 2018-03-29
Payer: MEDICARE

## 2018-03-29 VITALS — WEIGHT: 293 LBS | BODY MASS INDEX: 72.19 KG/M2

## 2018-03-29 NOTE — MR AVS SNAPSHOT
MRN:[id number]                      After Visit Summary   3/29/2018    Pricilla Brown    MRN: [id number]           Visit Information        Provider Department      3/29/2018 11:00 AM Velvet Mcallister RD [facility] Surgical Weight Management        Your next 10 appointments already scheduled     Apr 06, 2018  8:00 AM CDT   Return Visit with Norman Jean DPM   [facility] ([facility])    [address] [place] [id number] [phone]            Apr 12, 2018 10:15 AM CDT   LAB with [facility]    Health Lab ([facility])    [address] [place] [address]  [address] [facility] [id number] [phone]           Please do not eat 10-12 hours before your appointment if you are coming in fasting for labs on lipids, cholesterol, or glucose (sugar). This does not apply to pregnant women. Water, hot tea and black coffee (with nothing added) are okay. Do not drink other fluids, diet soda or chew gum.            Apr 12, 2018 11:00 AM CDT   (Arrive by 10:45 AM)   RETURN DIABETES with Isela Archibald PA-C   [facility] Endocrinology ([facility])    [address] [place] [address]  [address] [facility] [address] [phone]            Apr 16, 2018 11:40 AM CDT   (Arrive by 11:25 AM)   Return Visit with Sonal Ford MD   [facility] Medical Weight Management ([facility])    [address] [place] [address]  [address] [facility] [id number] [phone]            May 01, 2018  9:00 AM CDT   LAB with [facility]   [facility] Lab ([facility])    [address] [place] [address]  [address] [facility] [id number] [phone]           Please do not eat 10-12 hours before your appointment if you are coming in fasting for labs on lipids, cholesterol, or glucose (sugar). This does not apply to pregnant women. Water, hot tea and black coffee (with nothing added) are okay. Do not drink other  fluids, diet soda or chew gum.            May 29, 2018  8:30 AM CDT   (Arrive by 8:15 AM)   RETURN HEART FAILURE with Percy Alcala MD   [facility] ([facility])    [address]  [address]  [facility] [address] [phone]            May 29, 2018  9:00 AM CDT   (Arrive by 8:45 AM)   Return Visit with Silva Damon MD   [facility] Medical Weight Management ([facility])    [address]  [address]  [facility] [address] [phone]            Jun 19, 2018  1:30 PM CDT   Return Sleep Patient with YOCASTA Horan CNP   [facility] [place] Sleep Study ([facility])    [address] [place] [id number] [phone]              Care Instructions    GOALS:  Relating To Eating:  -Plan A:  Follow the Modified Liquid Diet for weight loss:  Breakfast: Protein Shake (Premier Protein)  Lunch: 3 oz lean protein + non-starchy vegetables  Supper: Protein Shake (Premier Protein)  Snack: non-starchy vegetables (no calorie-containing dips/condiments)  Beverages: at least 48-64 oz water between meals daily    *Protein Shake Criteria: ~200 Calories, at least 20 grams of protein, and less than 10 grams of sugar     -Plan B (if plan A is backfiring on you): Track food intake prior to eating on [email or web address] with a target of no more than 1,800 Calories for 2 lbs/week weight loss, 2400 mg sodium or less/day would be great.   *Have 1 protein meal replacement shake daily (Premier Protein).    *Eat as much non-starchy vegetables as you need to fill you up (no calorie-containing dips/condiments).    (non-starchy veg: green leafy vegetables, cucumbers, broccoli, cauliflower, green string beans)   *Have 1 fruit daily.   *Drink at least 64 oz water between meals daily    -Eat slowly (20-30 minutes per meal), chewing foods well (25 chews per bite/applesauce  consistency)    -Increase exercise as able.  (Pool therapy, walking, etc)    -Continue to work on quitting smoking.  Free national quitline: 800-QUIT-NOW (164-519-9605).  Hospital quit-smoking programs.  American Lung Association: (146.879.2811).  American Cancer Society (027-245-2546).           [employer] Information     [employer] gives you secure access to your electronic health record. If you see a primary care provider, you can also send messages to your care team and make appointments. If you have questions, please call your primary care clinic.  If you do not have a primary care provider, please call [phone] and they will assist you.      [employer] is an electronic gateway that provides easy, online access to your medical records. With [employer], you can request a clinic appointment, read your test results, renew a prescription or communicate with your care team.     To access your existing account, please contact your [facility] or call [phone] for assistance.        Care EveryWhere ID     This is your Care EveryWhere ID. This could be used by other organizations to access your [facility] medical records  [id number]        Equal Access to Services     KIERSTEN DRIVER : Maryam Woodward, malinda campos, bola weber. So [facility] [phone].    ATENCIÓN: Si habla español, tiene a de la rosa disposición servicios gratuitos de asistencia lingüística. Zara al [phone].    We comply with applicable federal civil rights laws and [place] laws. We do not discriminate on the basis of race, color, national origin, age, disability, sex, sexual orientation, or gender identity.

## 2018-03-29 NOTE — PATIENT INSTRUCTIONS
GOALS:  Relating To Eating:  -Plan A:  Follow the Modified Liquid Diet for weight loss:  Breakfast: Protein Shake (Premier Protein)  Lunch: 3 oz lean protein + non-starchy vegetables  Supper: Protein Shake (Premier Protein)  Snack: non-starchy vegetables (no calorie-containing dips/condiments)  Beverages: at least 48-64 oz water between meals daily    *Protein Shake Criteria: ~200 Calories, at least 20 grams of protein, and less than 10 grams of sugar     -Plan B (if plan A is backfiring on you): Track food intake prior to eating on [email or web address] with a target of no more than 1,800 Calories for 2 lbs/week weight loss, 2400 mg sodium or less/day would be great.   *Have 1 protein meal replacement shake daily (Premier Protein).    *Eat as much non-starchy vegetables as you need to fill you up (no calorie-containing dips/condiments).    (non-starchy veg: green leafy vegetables, cucumbers, broccoli, cauliflower, green string beans)   *Have 1 fruit daily.   *Drink at least 64 oz water between meals daily    -Eat slowly (20-30 minutes per meal), chewing foods well (25 chews per bite/applesauce consistency)    -Increase exercise as able.  (Pool therapy, walking, etc)    -Continue to work on quitting smoking.  Free national quitline: 800-QUIT-NOW (098-181-2218).  [facility] quit-smoking programs.  American Lung Association: (329.657.9191).  American Cancer Society (074-085-3985).

## 2018-03-30 DIAGNOSIS — E11.65 TYPE 2 DIABETES MELLITUS WITH HYPERGLYCEMIA, WITH LONG-TERM CURRENT USE OF INSULIN (H): ICD-10-CM

## 2018-03-30 DIAGNOSIS — Z79.4 TYPE 2 DIABETES MELLITUS WITH HYPERGLYCEMIA, WITH LONG-TERM CURRENT USE OF INSULIN (H): ICD-10-CM

## 2018-03-30 NOTE — TELEPHONE ENCOUNTER
Gabapentin 300 mg caps      Last Written Prescription Date:  2-7-18  Last Fill Quantity: 120,   # refills: 0  Last Office Visit : 2-28-18  Future Office visit:  4-12-18    Routing refill request to provider for review/approval because:  Not on protocol.    Kathleen M Doege RN

## 2018-03-31 RX ORDER — GABAPENTIN 300 MG/1
600 CAPSULE ORAL 2 TIMES DAILY
Qty: 120 CAPSULE | Refills: 0 | OUTPATIENT
Start: 2018-03-31

## 2018-04-02 RX ORDER — GABAPENTIN 300 MG/1
600 CAPSULE ORAL 2 TIMES DAILY
Qty: 120 CAPSULE | Refills: 0 | Status: SHIPPED | OUTPATIENT
Start: 2018-04-02 | End: 2018-07-12

## 2018-04-10 DIAGNOSIS — G47.33 OSA (OBSTRUCTIVE SLEEP APNEA): Primary | ICD-10-CM

## 2018-04-12 NOTE — PROGRESS NOTES
Reviewed case with Dr. Espinoza, sleep habits and nocturia likely influencing sleep continuity.  Will see back in clinic in 12 wks

## 2018-04-17 ENCOUNTER — TELEPHONE (OUTPATIENT)
Dept: SLEEP MEDICINE | Facility: CLINIC | Age: 43
End: 2018-04-17

## 2018-04-17 NOTE — TELEPHONE ENCOUNTER
Pt dropped off [employer] for to be filled out.  Form has been given to Dr. Melchor to review and sign due to Alecia being out of office.    HEATHER Walker

## 2018-04-18 ENCOUNTER — DOCUMENTATION ONLY (OUTPATIENT)
Dept: SLEEP MEDICINE | Facility: CLINIC | Age: 43
End: 2018-04-18
Payer: MEDICARE

## 2018-04-18 NOTE — PROGRESS NOTES
Patient was offered choice of vendor and chose [facility].  Patient Pricilla Brown was set up at [place] on April 18, 2018. Patient received a Resmed AirSense 10 Auto. Pressures were set at 5-15 cm H2O.   Patient s ramp is 5 cm H2O for Auto and FLEX/EPR is 2.  Patient received a resperonics Mask name: simplus ff  Full Face mask Size Medium, heated tubing and heated humidifier.  Patient is enrolled in the STM Program and does need to meet compliance. Patient has a follow up on 6/19 with Alecia Welch NP.    Iesha Francis

## 2018-04-19 DIAGNOSIS — Z79.4 TYPE 2 DIABETES MELLITUS WITH HYPERGLYCEMIA, WITH LONG-TERM CURRENT USE OF INSULIN (H): ICD-10-CM

## 2018-04-19 DIAGNOSIS — E11.65 TYPE 2 DIABETES MELLITUS WITH HYPERGLYCEMIA, WITH LONG-TERM CURRENT USE OF INSULIN (H): ICD-10-CM

## 2018-04-19 DIAGNOSIS — I42.0 DILATED CARDIOMYOPATHY (H): ICD-10-CM

## 2018-04-19 RX ORDER — INSULIN ASPART 100 [IU]/ML
INJECTION, SOLUTION INTRAVENOUS; SUBCUTANEOUS
Qty: 180 ML | Refills: 3 | Status: SHIPPED | OUTPATIENT
Start: 2018-04-19 | End: 2018-10-11

## 2018-04-20 RX ORDER — HYDRALAZINE HYDROCHLORIDE 25 MG/1
TABLET, FILM COATED ORAL
Qty: 450 TABLET | Refills: 3 | Status: SHIPPED | OUTPATIENT
Start: 2018-04-20 | End: 2018-05-29

## 2018-04-30 ENCOUNTER — ALLIED HEALTH/NURSE VISIT (OUTPATIENT)
Dept: SURGERY | Facility: CLINIC | Age: 43
End: 2018-04-30
Payer: MEDICARE

## 2018-04-30 ENCOUNTER — ANTICOAGULATION THERAPY VISIT (OUTPATIENT)
Dept: ANTICOAGULATION | Facility: CLINIC | Age: 43
End: 2018-04-30

## 2018-04-30 ENCOUNTER — TELEPHONE (OUTPATIENT)
Dept: SURGERY | Facility: CLINIC | Age: 43
End: 2018-04-30

## 2018-04-30 DIAGNOSIS — Z79.01 LONG-TERM (CURRENT) USE OF ANTICOAGULANTS: ICD-10-CM

## 2018-04-30 DIAGNOSIS — I48.91 ATRIAL FIBRILLATION (H): ICD-10-CM

## 2018-04-30 LAB — INR PPP: 1.72 (ref 0.86–1.14)

## 2018-04-30 NOTE — PATIENT INSTRUCTIONS
GOALS:  Relating To Eating:  -Track food intake prior to eating on MyFitnessPal.[email or web address] with a target of no more than 1,800 Calories for 2 lbs/week weight loss, 2400 mg sodium or less/day would be great.   *Have 1 protein meal replacement shake daily (Premier Protein).    *Eat as much non-starchy vegetables as you need to fill you up (no calorie-containing dips/condiments).    (non-starchy veg: green leafy vegetables, cucumbers, broccoli, cauliflower, green string beans)   *Have 1 fruit daily.   *Drink at least 64 oz water between meals daily    -Eat slowly (20-30 minutes per meal), chewing foods well (25 chews per bite/applesauce consistency)    -Increase exercise as able.  (Pool therapy, walking, etc)    -Continue to work on quitting smoking.

## 2018-04-30 NOTE — MR AVS SNAPSHOT
MRN:[id number]                      After Visit Summary   4/30/2018    Pricilla Brown    MRN: [id number]           Visit Information        Provider Department      4/30/2018 11:00 AM Paulette Ruiz RD [facility] Surgical Weight Management        Your next 10 appointments already scheduled     Apr 30, 2018 11:45 AM CDT   LAB with  LAB   [facility] ([facility])    [address] [place] [id number] [phone]           Please do not eat 10-12 hours before your appointment if you are coming in fasting for labs on lipids, cholesterol, or glucose (sugar). This does not apply to pregnant women. Water, hot tea and black coffee (with nothing added) are okay. Do not drink other fluids, diet soda or chew gum.            May 01, 2018  9:00 AM CDT   LAB with  LAB   [facility] ([facility])    [address] [place] [id number] [phone]           Please do not eat 10-12 hours before your appointment if you are coming in fasting for labs on lipids, cholesterol, or glucose (sugar). This does not apply to pregnant women. Water, hot tea and black coffee (with nothing added) are okay. Do not drink other fluids, diet soda or chew gum.            May 04, 2018 11:30 AM CDT   (Arrive by 11:15 AM)   RETURN DIABETES with Isela Archibald PA-C   [facility] Endocrinology ([facility])    [address] [place] [id number]   [phone]            May 07, 2018  1:00 PM CDT   Return Visit with Norman Jean DPM   [facility] ([facility])    [address] [place] [id number] [phone]            May 29, 2018  8:30 AM CDT   (Arrive by 8:15 AM)   RETURN HEART FAILURE with Percy Alcala MD   [facility] Care ([facility])    [address] [place] [address]  [address]  [facility] [address]   [phone]            May 29, 2018  9:00 AM CDT   (Arrive by 8:45 AM)   Return Visit with Silva Damon MD   [facility] ([facility] [place])    [address]  [address]th [address]  [facility] [id number] [phone]            Jun 19, 2018  1:30 PM CDT   Return Sleep Patient with YOCASTA Horan CNP   [facility] [place] Sleep Study ([facility])    [address] [place] [id number] [phone]              Care Instructions    GOALS:  Relating To Eating:  -Track food intake prior to eating on MyFitnessPal.com with a target of no more than 1,800 Calories for 2 lbs/week weight loss, 2400 mg sodium or less/day would be great.   *Have 1 protein meal replacement shake daily (Premier Protein).    *Eat as much non-starchy vegetables as you need to fill you up (no calorie-containing dips/condiments).    (non-starchy veg: green leafy vegetables, cucumbers, broccoli, cauliflower, green string beans)   *Have 1 fruit daily.   *Drink at least 64 oz water between meals daily    -Eat slowly (20-30 minutes per meal), chewing foods well (25 chews per bite/applesauce consistency)    -Increase exercise as able.  (Pool therapy, walking, etc)    -Continue to work on quitting smoking.          [employer] Information     [employer] gives you secure access to your electronic health record. If you see a primary care provider, you can also send messages to your care team and make appointments. If you have questions, please call your primary care clinic.  If you do not have a primary care provider, please call [phone] and they will assist you.      [employer] is an electronic gateway that provides easy, online access to your medical records. With [employer], you can request a clinic appointment, read your test results, renew a prescription or communicate with your care team.     To access your  existing account, please contact your [facility] or call [phone] for assistance.        Care EveryWhere ID     This is your Care EveryWhere ID. This could be used by other organizations to access your [facility] medical records  [id number]        Equal Access to Services     KIERSTEN DRIVER : Maryam Woodward, malinda campos, ellen shah, bola walker. So [facility] [phone].    ATENCIÓN: Si habla español, tiene a de la rosa disposición servicios gratuitos de asistencia lingüística. Llame al [phone].    We comply with applicable federal civil rights laws and [place] laws. We do not discriminate on the basis of race, color, national origin, age, disability, sex, sexual orientation, or gender identity.

## 2018-04-30 NOTE — PROGRESS NOTES
ANTICOAGULATION FOLLOW-UP CLINIC VISIT    Patient Name:  Pricilla Brown  Date:  4/30/2018  Contact Type:  Telephone    SUBJECTIVE:     Patient Findings     Positives Missed doses (missed warfarin dose 4/27/18)           OBJECTIVE    INR   Date Value Ref Range Status   04/30/2018 1.72 (H) 0.86 - 1.14 Final     Chromogenic Factor 10   Date Value Ref Range Status   04/08/2017 19 (L) 70 - 130 % Final     Comment:     Therapeutic Range:  A Chromogenic Factor 10 level of approximately 20-40%   inversely correlates with an INR of 2-3 for patients receiving Warfarin.   Chromogenic Factor 10 levels below 20% indicate an INR greater than 3 and   levels above 40% indicate an INR less than 2.         ASSESSMENT / PLAN  INR assessment SUB    Recheck INR In: 1 WEEK    INR Location Clinic      Anticoagulation Summary as of 4/30/2018     INR goal 2.0-2.5   Today's INR 1.72!   Maintenance plan 20 mg (5 mg x 4) on Mon, Fri; 15 mg (5 mg x 3) all other days   Full instructions 4/30: 25 mg; Otherwise 20 mg on Mon, Fri; 15 mg all other days   Weekly total 115 mg   Plan last modified Velvet Asencio, RN (3/15/2017)   Next INR check    Priority INR   Target end date Indefinite    Indications   Atrial fibrillation (H) [I48.91] [I48.91]  Long-term (current) use of anticoagulants [Z79.01] [Z79.01]         Anticoagulation Episode Summary     INR check location Clinic Lab    Preferred lab     Send INR reminders to [facility]    Comments Contact Patient at [phone]      Anticoagulation Care Providers     Provider Role Specialty Phone number    Percy Alcala MD  Cardiology [phone]            See the Encounter Report to view Anticoagulation Flowsheet and Dosing Calendar (Go to Encounters tab in chart review, and find the Anticoagulation Therapy Visit)    Spoke with Pricilla.  She missed a 20 mg dose of warfarin 4/27/18.  She has an appt at [place] [facility] on 5/7/18 and will recheck INR at that time.    Velvet Khan,  RN

## 2018-04-30 NOTE — MR AVS SNAPSHOT
Pricilla Brown   4/30/2018   Anticoagulation Therapy Visit    Description:  43 year old female   Provider:  Velvet Khan, RN   Department:  [facility]           INR as of 4/30/2018     Today's INR 1.72!      Anticoagulation Summary as of 4/30/2018     INR goal 2.0-2.5   Today's INR 1.72!   Full instructions 4/30: 25 mg; Otherwise 20 mg on Mon, Fri; 15 mg all other days   Next INR check     Indications   Atrial fibrillation (H) [I48.91] [I48.91]  Long-term (current) use of anticoagulants [Z79.01] [Z79.01]         April 2018 Details    Sun Mon Tue Wed Thu Fri Sat     1               2               3               4               5               6               7                 8               9               10               11               12               13               14                 15               16               17               18               19               20               21                 22               23               24               25               26               27               28                 29               30      25 mg   See details            Date Details   04/30 This INR check      Date of next INR: No date specified         How to take your warfarin dose     To take:  25 mg Take 5 of the 5 mg tablets.

## 2018-04-30 NOTE — PROGRESS NOTES
"  Bariatric Nutrition Consultation Note    Reason For Visit: Nutrition Reassessment    Pricilla Brown is a 43 year-old (type 2 DM on insulin sliding scale) presenting today for bariatric nutrition consult.  Pt is interested in laparoscopic sleeve gastrectomy.  Pt has met all required RD visits prior to surgery.  Pt was referred by NANETTE Kerr and Dr. Silva Damon.    Coordination Notes:,   4/30/18: Patient reported she plans to follow up with medical weight management MD    ANTHROPOMETRICS:  Ht: 67\"  Initial Wt: 462.6 lbs with BMI of 72.6  Current Wt: 457.5 lbs  460.9 lbs (-3.4 lbs from last month; -5.1 lbs from initial weight)     Required weight loss goal pre-op: -46 lbs from initial consult weight (goal weight 416.6 lbs or less before surgery)    NUTRITION HISTORY:  Food Allergies/Intolerances: none known  Cravings: sweets, chips, pop (diet)  Triggers/cues causing extra eating: boredom (currently on disability, not working)  *Pt is on topiramate, but feels it no longer is working.  She plans to follow-up with Dr. Silva Damon soon. -> Missed last appointment with Dr. Ascencio. Encouraged to follow up with medical weight management.     Progress with Previous Goals:  Relating To Eating:  -Plan A:  Follow the Modified Liquid Diet for weight loss: - Pt feels that 2 protein shakes/day is too difficult right now.  Pt plans to see endocrinologist about a more effective appetite suppressant.  Breakfast: Protein Shake (Premier Protein)  Lunch: 3 oz lean protein + non-starchy vegetables  Supper: Protein Shake (Premier Protein)  Snack: non-starchy vegetables (no calorie-containing dips/condiments)  Beverages: at least 48-64 oz water between meals daily    *Protein Shake Criteria: ~200 Calories, at least 20 grams of protein, and less than 10 grams of sugar     -Plan B (if plan A is backfiring on you): Track food intake prior to eating on MyFitnessPal.com with a target of no more than 1,800 Calories for " 2 lbs/week weight loss, 2400 mg sodium or less/day would be great. - Pt hasn't been tracking intake in a few weeks.    *Have 1 protein meal replacement shake daily (Premier Protein). - Meeting for breakfast.    *Eat as much non-starchy vegetables as you need to fill you up (no calorie-containing dips/condiments). - Meeting.  Avoiding sweet pops, etc.     (non-starchy veg: green leafy vegetables, cucumbers, broccoli, cauliflower, green string beans)   *Have 1 fruit daily. - Meeting with 0-1 fruits/day.    *Drink at least 64 oz water between meals daily - meeting.     -Eat slowly (20-30 minutes per meal), chewing foods well (25 chews per bite/applesauce consistency) - Meeting.     -Increase exercise as able.  (Pool therapy, walking, etc)  - Pt is walking as much as she can in her daily activities.  She hasn't started pool therapy because she needs a referral first from her doctor.     -Continue to work on quitting smoking.  - Pt is working on cutting back on smoking.  She has the resources for smoking cessation.     NUTRITION DIAGNOSIS:  Obesity r/t long history of self-monitoring deficit and excessive energy intake aeb BMI >30.- continues    INTERVENTION:  Intervention Provided/Education Provided:  Praised Pt on weight lost over the past month.  Reviewed previous goals.  Patient would like to continue with one protein shake per day and if able to adjust appetite suppressant may begin using two shakes per day.  Patient reported that she increased activity this past month (more steps each day).  Gave encouragement and support.  Provided Pt with list of goals and task list.      Patient Understanding: good  Expected Compliance: good      GOALS:  Relating To Eating:  -Track food intake prior to eating on [email or web address] with a target of no more than 1,800 Calories for 2 lbs/week weight loss, 2400 mg sodium or less/day would be great.   *Have 1 protein meal replacement shake daily (Premier Protein).    *Eat as much  non-starchy vegetables as you need to fill you up (no calorie-containing dips/condiments).    (non-starchy veg: green leafy vegetables, cucumbers, broccoli, cauliflower, green string beans)   *Have 1 fruit daily.   *Drink at least 64 oz water between meals daily    -Eat slowly (20-30 minutes per meal), chewing foods well (25 chews per bite/applesauce consistency)    -Increase exercise as able.  (Pool therapy, walking, etc)    -Continue to work on quitting smoking.     Follow-Up: follow-up with RD in 1 month.     Time spent with patient: 15 minutes     Paulette Ruiz, MS, RDN, LDN, CLT  Pager: [phone]

## 2018-05-28 DIAGNOSIS — R06.02 SOB (SHORTNESS OF BREATH): ICD-10-CM

## 2018-05-28 DIAGNOSIS — I27.0 PRIMARY PULMONARY HYPERTENSION (H): Primary | ICD-10-CM

## 2018-05-29 ENCOUNTER — ANTICOAGULATION THERAPY VISIT (OUTPATIENT)
Dept: ANTICOAGULATION | Facility: CLINIC | Age: 43
End: 2018-05-29

## 2018-05-29 ENCOUNTER — TELEPHONE (OUTPATIENT)
Dept: CARDIOLOGY | Facility: CLINIC | Age: 43
End: 2018-05-29

## 2018-05-29 ENCOUNTER — OFFICE VISIT (OUTPATIENT)
Dept: ENDOCRINOLOGY | Facility: CLINIC | Age: 43
End: 2018-05-29
Payer: MEDICARE

## 2018-05-29 ENCOUNTER — OFFICE VISIT (OUTPATIENT)
Dept: CARDIOLOGY | Facility: CLINIC | Age: 43
End: 2018-05-29
Attending: INTERNAL MEDICINE
Payer: MEDICARE

## 2018-05-29 ENCOUNTER — APPOINTMENT (OUTPATIENT)
Dept: LAB | Facility: CLINIC | Age: 43
End: 2018-05-29
Payer: MEDICARE

## 2018-05-29 ENCOUNTER — ALLIED HEALTH/NURSE VISIT (OUTPATIENT)
Dept: SURGERY | Facility: CLINIC | Age: 43
End: 2018-05-29
Payer: MEDICARE

## 2018-05-29 VITALS
WEIGHT: 293 LBS | HEIGHT: 67 IN | DIASTOLIC BLOOD PRESSURE: 85 MMHG | SYSTOLIC BLOOD PRESSURE: 132 MMHG | BODY MASS INDEX: 45.99 KG/M2 | HEART RATE: 91 BPM | OXYGEN SATURATION: 97 %

## 2018-05-29 VITALS
DIASTOLIC BLOOD PRESSURE: 85 MMHG | HEART RATE: 91 BPM | OXYGEN SATURATION: 97 % | SYSTOLIC BLOOD PRESSURE: 132 MMHG | HEIGHT: 67 IN

## 2018-05-29 DIAGNOSIS — E66.01 MORBID OBESITY (H): Primary | ICD-10-CM

## 2018-05-29 DIAGNOSIS — R06.09 DOE (DYSPNEA ON EXERTION): Primary | ICD-10-CM

## 2018-05-29 DIAGNOSIS — I48.91 ATRIAL FIBRILLATION (H): ICD-10-CM

## 2018-05-29 DIAGNOSIS — I42.5 OTHER RESTRICTIVE CARDIOMYOPATHY (H): ICD-10-CM

## 2018-05-29 DIAGNOSIS — M15.0 PRIMARY OSTEOARTHRITIS INVOLVING MULTIPLE JOINTS: ICD-10-CM

## 2018-05-29 DIAGNOSIS — E66.01 MORBID OBESITY (H): ICD-10-CM

## 2018-05-29 DIAGNOSIS — Z79.01 LONG-TERM (CURRENT) USE OF ANTICOAGULANTS: ICD-10-CM

## 2018-05-29 DIAGNOSIS — I15.9 SECONDARY HYPERTENSION: ICD-10-CM

## 2018-05-29 DIAGNOSIS — E11.43 TYPE 2 DIABETES MELLITUS WITH DIABETIC AUTONOMIC NEUROPATHY, WITHOUT LONG-TERM CURRENT USE OF INSULIN (H): ICD-10-CM

## 2018-05-29 DIAGNOSIS — I42.0 DILATED CARDIOMYOPATHY (H): ICD-10-CM

## 2018-05-29 LAB
ALBUMIN SERPL-MCNC: 3.6 G/DL (ref 3.4–5)
ALP SERPL-CCNC: 85 U/L (ref 40–150)
ALT SERPL W P-5'-P-CCNC: 20 U/L (ref 0–50)
ANION GAP SERPL CALCULATED.3IONS-SCNC: 8 MMOL/L (ref 3–14)
AST SERPL W P-5'-P-CCNC: 12 U/L (ref 0–45)
BILIRUB SERPL-MCNC: 0.4 MG/DL (ref 0.2–1.3)
BUN SERPL-MCNC: 20 MG/DL (ref 7–30)
CALCIUM SERPL-MCNC: 9.6 MG/DL (ref 8.5–10.1)
CHLORIDE SERPL-SCNC: 101 MMOL/L (ref 94–109)
CO2 SERPL-SCNC: 25 MMOL/L (ref 20–32)
CREAT SERPL-MCNC: 1.21 MG/DL (ref 0.52–1.04)
GFR SERPL CREATININE-BSD FRML MDRD: 48 ML/MIN/1.7M2
GLUCOSE SERPL-MCNC: 146 MG/DL (ref 70–99)
HBA1C MFR BLD: 9.6 % (ref 0–5.6)
HGB BLD-MCNC: 15.9 G/DL (ref 11.7–15.7)
INR PPP: 2.18 (ref 0.86–1.14)
POTASSIUM SERPL-SCNC: 4.2 MMOL/L (ref 3.4–5.3)
PROT SERPL-MCNC: 9.5 G/DL (ref 6.8–8.8)
SODIUM SERPL-SCNC: 134 MMOL/L (ref 133–144)

## 2018-05-29 PROCEDURE — 83036 HEMOGLOBIN GLYCOSYLATED A1C: CPT | Performed by: INTERNAL MEDICINE

## 2018-05-29 PROCEDURE — G0463 HOSPITAL OUTPT CLINIC VISIT: HCPCS | Mod: ZF

## 2018-05-29 PROCEDURE — 85610 PROTHROMBIN TIME: CPT | Performed by: INTERNAL MEDICINE

## 2018-05-29 PROCEDURE — 85018 HEMOGLOBIN: CPT | Performed by: INTERNAL MEDICINE

## 2018-05-29 PROCEDURE — 80053 COMPREHEN METABOLIC PANEL: CPT | Performed by: INTERNAL MEDICINE

## 2018-05-29 PROCEDURE — 99214 OFFICE O/P EST MOD 30 MIN: CPT | Mod: ZP | Performed by: INTERNAL MEDICINE

## 2018-05-29 PROCEDURE — 36415 COLL VENOUS BLD VENIPUNCTURE: CPT | Performed by: INTERNAL MEDICINE

## 2018-05-29 RX ORDER — HYDRALAZINE HYDROCHLORIDE 25 MG/1
50-75 TABLET, FILM COATED ORAL
Qty: 450 TABLET | Refills: 3 | COMMUNITY
Start: 2018-05-29 | End: 2021-10-20

## 2018-05-29 RX ORDER — TOPIRAMATE 25 MG/1
TABLET, FILM COATED ORAL
Qty: 360 TABLET | Refills: 2 | Status: SHIPPED | OUTPATIENT
Start: 2018-05-29 | End: 2018-10-09 | Stop reason: DRUGHIGH

## 2018-05-29 ASSESSMENT — ENCOUNTER SYMPTOMS
CLAUDICATION: 0
SKIN CHANGES: 0
HOARSE VOICE: 0
BREAST MASS: 0
NIGHT SWEATS: 0
BREAST PAIN: 0
SINUS CONGESTION: 0
WHEEZING: 0
ALTERED TEMPERATURE REGULATION: 0
SPUTUM PRODUCTION: 0
HALLUCINATIONS: 0
BACK PAIN: 1
EYE REDNESS: 1
TINGLING: 0
SPEECH CHANGE: 0
NAIL CHANGES: 0
SLEEP DISTURBANCES DUE TO BREATHING: 1
POLYPHAGIA: 1
SMELL DISTURBANCE: 0
EYE IRRITATION: 1
LOSS OF CONSCIOUSNESS: 0
DIFFICULTY URINATING: 0
RESPIRATORY PAIN: 0
FLANK PAIN: 0
INSOMNIA: 0
NECK MASS: 0
MEMORY LOSS: 0
ABDOMINAL PAIN: 0
STIFFNESS: 0
SINUS PAIN: 0
RECTAL PAIN: 0
DOUBLE VISION: 1
NERVOUS/ANXIOUS: 0
WEIGHT LOSS: 0
BLOOD IN STOOL: 0
CHILLS: 0
LEG SWELLING: 1
BRUISES/BLEEDS EASILY: 0
HEADACHES: 0
PANIC: 0
VOMITING: 0
HYPERTENSION: 1
POSTURAL DYSPNEA: 0
HOT FLASHES: 0
HEARTBURN: 0
JAUNDICE: 0
RECTAL BLEEDING: 0
ORTHOPNEA: 1
TACHYCARDIA: 1
WHEEZING: 1
WEAKNESS: 0
POOR WOUND HEALING: 0
SWOLLEN GLANDS: 0
EXERCISE INTOLERANCE: 1
DISTURBANCES IN COORDINATION: 0
WEIGHT GAIN: 0
PALPITATIONS: 1
MUSCLE CRAMPS: 0
LIGHT-HEADEDNESS: 0
HEMOPTYSIS: 0
EXTREMITY NUMBNESS: 0
COUGH: 0
DYSURIA: 0
HEMATURIA: 0
BLOATING: 0
DECREASED CONCENTRATION: 0
MUSCLE WEAKNESS: 1
TROUBLE SWALLOWING: 0
CONSTIPATION: 0
TREMORS: 0
DECREASED APPETITE: 0
DYSPNEA ON EXERTION: 1
FEVER: 0
DIARRHEA: 0
HYPOTENSION: 0
BOWEL INCONTINENCE: 0
INCREASED ENERGY: 1
SORE THROAT: 0
EYE PAIN: 0
FATIGUE: 1
PARALYSIS: 0
DIZZINESS: 0
NAUSEA: 0
SYNCOPE: 0
LEG PAIN: 1
EXERCISE INTOLERANCE: 0
SNORES LOUDLY: 0
POLYDIPSIA: 1
DOUBLE VISION: 0
TASTE DISTURBANCE: 0
MYALGIAS: 1
POSTURAL DYSPNEA: 1
COUGH DISTURBING SLEEP: 0
MYALGIAS: 0
DECREASED LIBIDO: 0
SHORTNESS OF BREATH: 1
SEIZURES: 0
SNORES LOUDLY: 1
EYE WATERING: 1
NUMBNESS: 0
NECK PAIN: 0
ARTHRALGIAS: 0
MUSCLE CRAMPS: 1
DEPRESSION: 0
JOINT SWELLING: 0

## 2018-05-29 ASSESSMENT — PAIN SCALES - GENERAL: PAINLEVEL: NO PAIN (0)

## 2018-05-29 NOTE — MR AVS SNAPSHOT
After Visit Summary   5/29/2018    Pricilla Borwn    MRN: [id number]           Patient Information     Date Of Birth          1975        Visit Information        Provider Department      5/29/2018 8:30 AM Percy Alcala MD [facility]        Today's Diagnoses     MURRAY (dyspnea on exertion)    -  1    Other restrictive cardiomyopathy (H)        Primary osteoarthritis involving multiple joints        Morbid obesity (H)        Type 2 diabetes mellitus with diabetic autonomic neuropathy, without long-term current use of insulin (H)        Secondary hypertension        Rheumatoid arthritis (H)        Dilated cardiomyopathy (H)          Care Instructions    You were seen today in the Cardiovascular Clinic at the [facility].   Cardiology Providers you saw during your visit:    Dr. Percy Alcala    Recommendations:   1. Increase hydralazine to 25mg's in the am, 50mg's at noon, 75mg's in the evening.    2. Continue weight loss  3. Labs today: CMP, INR, hemoglobin  4. Referral: Neftali Smallwood for pool therapy: arthritis, weight loss    Follow-up:   -Follow up with Dr. Alcala in 2 weeks    For emergencies call 911.     If you have any questions regarding your visit please contact your care team:     Alexandru Wang RN  [facility]  Cardiology Care Coordinator-Heart Failure    Appointment scheduling or nurse questions: [phone]    On Call Cardiologist for after hours or on weekends: [phone]    option #4    If you need a medication refill please contact your pharmacy.  Please allow 3 business days for your refill to be completed.    As always, thank you for trusting us with your health care needs!                  Follow-ups after your visit        Additional Services     PHYSICAL THERAPY REFERRAL       Referral to Neftali Smallwood for hydrotherapy for weight loss and treatment of degenerative athritis    Please be aware that coverage of these services is  "subject to the terms and limitations of your health insurance plan.  Call member services at your health plan with any benefit or coverage questions.      **Note to Provider:  If you are referring outside of [place] for the therapy appointment, please list the name of the location in the \"special instructions\" above, print the referral and give to the patient to schedule the appointment.            PHYSICAL THERAPY REFERRAL       *This therapy referral will be filtered to a centralized scheduling office at [facility] and the patient will receive a call to schedule an appointment at a [place] location most convenient for them. *     [facility] provides Physical Therapy evaluation and treatment and many specialty services across the [place] system.  If requesting a specialty program, please choose from the list below.    If you have not heard from the scheduling office within 2 business days, please call [phone] for all locations, with the exception of [place], please call [phone] and [facility], please call [phone]  Treatment: Evaluation & Treatment  Special Instructions/Modalities: Pool therapy for weight loss  Special Programs: Aquatic Therapy ([place], [place] and [place] locations only)    Please be aware that coverage of these services is subject to the terms and limitations of your health insurance plan.  Call member services at your health plan with any benefit or coverage questions.      **Note to Provider:  If you are referring outside of [place] for the therapy appointment, please list the name of the location in the \"special instructions\" above, print the referral and give to the patient to schedule the appointment.                  Your next 10 appointments already scheduled     Jun 19, 2018  1:30 PM CDT   Return Sleep Patient with YOCASTA Horan CNP   [facility], Sleep Study ([facility] [place], " "[facility])    [address] [place] [id number]   [phone]            Aug 03, 2018  3:00 PM CDT   (Arrive by 2:45 PM)   RETURN DIABETES with Isela Archibald PA-C   [facility] Endocrinology ([facility])    [address] [facility] [address] [facility]  3rd Floor  [facility] [id number] [phone]              Who to contact     If you have questions or need follow up information about today's clinic visit or your schedule please contact [facility] directly at [phone].  Normal or non-critical lab and imaging results will be communicated to you by Voxiehart, letter or phone within 4 business days after the clinic has received the results. If you do not hear from us within 7 days, please contact the clinic through iHealtht or phone. If you have a critical or abnormal lab result, we will notify you by phone as soon as possible.  Submit refill requests through OpDemand or call your pharmacy and they will forward the refill request to us. Please allow 3 business days for your refill to be completed.          Additional Information About Your Visit        MyChart Information     OpDemand gives you secure access to your electronic health record. If you see a primary care provider, you can also send messages to your care team and make appointments. If you have questions, please call your primary care clinic.  If you do not have a primary care provider, please call [phone] and they will assist you.        Care EveryWhere ID     This is your Care EveryWhere ID. This could be used by other organizations to access your [place] medical records  [id number]        Your Vitals Were     Pulse Height Pulse Oximetry             91 5' 7\" 97%          Blood Pressure from Last 3 Encounters:   05/29/18 132/85   05/29/18 132/85   03/20/18 124/80    Weight from Last 3 Encounters:   05/29/18 (!) 466 lb 12.8 oz   03/29/18 (!) 460 lb 14.4 oz   03/20/18 (!) 460 lb              We Performed the " Following     Comprehensive metabolic panel     Follow Up ([facility])     Follow-Up with Cardiologist     Hemoglobin A1c     Hemoglobin     INR     PHYSICAL THERAPY REFERRAL     PHYSICAL THERAPY REFERRAL          Today's Medication Changes          These changes are accurate as of 5/29/18 10:26 AM.  If you have any questions, ask your nurse or doctor.               These medicines have changed or have updated prescriptions.        Dose/Directions    hydrALAZINE 25 MG tablet   Commonly known as:  APRESOLINE   This may have changed:  additional instructions   Used for:  Dilated cardiomyopathy (H)   Changed by:  Percy Alcala MD        Take 1 tab (25 mg) in am, 2 tabs (50 mg) midday, and 3 tabs (75 mg) in pm daily   Quantity:  450 tablet   Refills:  3       topiramate 25 MG tablet   Commonly known as:  TOPAMAX   This may have changed:    - how much to take  - how to take this  - when to take this  - additional instructions   Used for:  Morbid obesity (H)   Changed by:  Silva Damon MD        25 mg at bedtime for 1 week, 50 mg at bedtime for 1 week and 75 mg daily at bedtime thereafter   Quantity:  360 tablet   Refills:  2            Where to get your medicines      These medications were sent to [address]  [address] [place] [address] [address] [place], [place] [id number]    Hours:  TRANSPLANT PHONE NUMBER [phone] Phone:  [phone]     topiramate 25 MG tablet                Primary Care Provider Office Phone # Fax #    Jamilah Bernal [phone]       [address] [place] [address]        Equal Access to Services     KIERSTEN DRIVER AH: Hadjamal bower Sorosmery, waaxda luqadaha, qaybta kaalmada alyssa, bola walker. So [facility] [phone].    ATENCIÓN: Si habla español, tiene a de la rosa disposición servicios gratuitos de asistencia lingüística. Llame al [phone].    We  comply with applicable federal civil rights laws and [place] laws. We do not discriminate on the basis of race, color, national origin, age, disability, sex, sexual orientation, or gender identity.            Thank you!     Thank you for choosing [facility]  for your care. Our goal is always to provide you with excellent care. Hearing back from our patients is one way we can continue to improve our services. Please take a few minutes to complete the written survey that you may receive in the mail after your visit with us. Thank you!             Your Updated Medication List - Protect others around you: Learn how to safely use, store and throw away your medicines at www.disposemymeds.org.          This list is accurate as of 5/29/18 10:26 AM.  Always use your most recent med list.                   Brand Name Dispense Instructions for use Diagnosis    acetaminophen 325 MG tablet    TYLENOL    180 tablet    Take 2 tablets (650 mg) by mouth 3 times daily as needed for mild pain or fever (total acetaminophen dose should not exceed 3000 mg per day)    Neuropathic pain of lower extremity, unspecified laterality       albuterol 108 (90 Base) MCG/ACT Inhaler    PROAIR HFA/PROVENTIL HFA/VENTOLIN HFA    1 Inhaler    Inhale 2 puffs into the lungs every 6 hours as needed.    Reactive airway disease       bisacodyl 10 MG Suppository    DULCOLAX    6 suppository    Place 1 suppository (10 mg) rectally daily as needed for constipation    Constipation       blood glucose lancets standard    no brand specified    200 each    Use to test blood sugar 5 times daily or as directed.    Diabetes mellitus, type 2 (H)       blood glucose monitoring test strip    no brand specified    100 strip    Use to test blood sugars 3 times daily or as directed    Diabetes mellitus, type 2 (H)       buPROPion 100 MG 12 hr tablet    WELLBUTRIN SR    180 tablet    Take 1 tablet (100 mg) by mouth 2 times daily    Tobacco abuse       capsaicin  0.025 % Crea cream    ZOSTRIX    1 Tube    Apply 1 g topically 3 times daily as needed    Dermatophytosis of nail       ciclopirox 8 % Soln     1 Bottle    Externally apply topically daily To toenails.    Dermatophytosis of nail       clotrimazole 1 % cream    LOTRIMIN    30 g    Apply topically 2 times daily    Tinea pedis of right foot       colchicine 0.6 MG tablet    COLCYRS    12 tablet    Take 1-2 tablets (0.6-1.2 mg) by mouth daily as needed for moderate pain    Acute gouty arthritis       diclofenac 1 % Gel topical gel    VOLTAREN    100 g    Apply 4 grams to knees or 2 grams to hands four times daily using enclosed dosing card.    Left foot pain       dulaglutide 1.5 MG/0.5ML pen    TRULICITY    45 mL    Inject 1.5 mg Subcutaneous every 7 days    Type 2 diabetes mellitus with hyperglycemia, with long-term current use of insulin (H)       DULoxetine 20 MG EC capsule    CYMBALTA    60 capsule    Take 1 capsule (20 mg) by mouth 2 times daily    Depression       Efinaconazole 10 % Soln     8 mL    Externally apply topically daily To toenails.    Dermatophytosis of nail       gabapentin 300 MG capsule    NEURONTIN    120 capsule    Take 2 capsules (600 mg) by mouth 2 times daily Needs PCP appt for further refills    Type 2 diabetes mellitus with hyperglycemia, with long-term current use of insulin (H)       hydrALAZINE 25 MG tablet    APRESOLINE    450 tablet    Take 1 tab (25 mg) in am, 2 tabs (50 mg) midday, and 3 tabs (75 mg) in pm daily    Dilated cardiomyopathy (H)       * iBG star w/Device Kit     1 kit    -PLEASE GIVE PATIENT A DEVICE HER INSURANCE WILL COVER-    Type 2 diabetes mellitus with hyperglycemia, with long-term current use of insulin (H)       * blood glucose monitoring meter device kit    no brand specified    1 kit    Use to test blood sugar 3 times daily or as directed.    Diabetes mellitus, type 2 (H)       insulin glargine U-300 300 UNIT/ML injection    TOUJEO    30 mL    Inject 170 units  SQ each am.    Type 2 diabetes mellitus with hyperglycemia, with long-term current use of insulin (H)       levothyroxine 200 MCG tablet    SYNTHROID/LEVOTHROID    102 tablet    Take 1 tab Monday thru Saturday and 2 tabs on Sundays.    Other specified hypothyroidism       metoprolol succinate 50 MG 24 hr tablet    TOPROL-XL    90 tablet    Take 0.5 tablets (25 mg) by mouth At Bedtime    Chronic diastolic heart failure (H)       morphine 15 MG 12 hr tablet    MS CONTIN     Take 15 mg by mouth daily as needed        Nebulizer Compressor Kit     1 kit    1 Device 4 times daily as needed.    COPD (chronic obstructive pulmonary disease) (H)       nicotine 14 MG/24HR 24 hr patch    [employer] NICOTINE    30 patch    Place 1 patch onto the skin every 24 hours    Tobacco abuse       NovoLOG FLEXPEN 100 UNIT/ML injection   Generic drug:  insulin aspart     180 mL    Inject 65 units with meals plus correction. Pt uses approx 190 units in 24 hrs.    Type 2 diabetes mellitus with hyperglycemia, with long-term current use of insulin (H)       nystatin cream    MYCOSTATIN    90 g    Apply topically 2 times daily To toenails    Dermatophytosis of nail       order for DME     1 each    Use your CPAP device as directed by your provider. Pressure change to min 13 max 18cwp        * order for DME     1 each    Equipment being ordered: Challenger Wide walker if available - patient needs seat, basket and brakes.    Dilated cardiomyopathy (H), Chronic systolic heart failure (H)       * order for DME     1 each    Equipment being ordered: BI 6480-4802 $92  Plantar, fasciitis, LG, night splint    Dermatophytosis of nail, Plantar fasciitis of right foot, Diabetic neuropathy with neurologic complication (H), Peroneal tendinitis, right       * order for DME     1 Units    Left foot    Left foot pain, Diabetic neuropathy with neurologic complication (H)       oxyCODONE-acetaminophen 5-325 MG per tablet    PERCOCET    60 tablet    Take 1 tablet  by mouth every 8 hours as needed for moderate to severe pain (try to limit use, no further prescriptions until seen in pain clinic)    Lumbago, Bilateral low back pain with sciatica, sciatica laterality unspecified       polyethylene glycol powder    MIRALAX/GLYCOLAX          potassium chloride SA 20 MEQ CR tablet    KLOR-CON    240 tablet    Take 2 tablets (40 mEq) by mouth 4 times daily    Hypokalemia       senna 8.6 MG tablet    SENOKOT    45 tablet    Take 1 tablet by mouth 2 times daily    Constipation       spironolactone 50 MG tablet    ALDACTONE    30 tablet    Take 1 tablet (50 mg) by mouth daily        tiotropium 18 MCG capsule    SPIRIVA HANDIHALER    30 capsule    Inhale contents of one capsule daily.    COPD (chronic obstructive pulmonary disease) (H)       topiramate 25 MG tablet    TOPAMAX    360 tablet    25 mg at bedtime for 1 week, 50 mg at bedtime for 1 week and 75 mg daily at bedtime thereafter    Morbid obesity (H)       torsemide 100 MG tablet    DEMADEX    180 tablet    Take 1 tablet (100 mg) by mouth 2 times daily    Chronic diastolic heart failure (H)       warfarin 5 MG tablet    COUMADIN    100 tablet    Take 20 mg (four tabs) M and F and 15 mg (three tabs) all other days of the week or as directed by the Coumadin Clinic.    Long term current use of anticoagulant therapy       * Notice:  This list has 5 medication(s) that are the same as other medications prescribed for you. Read the directions carefully, and ask your doctor or other care provider to review them with you.

## 2018-05-29 NOTE — LETTER
5/29/2018      RE: Pricilla Brown  [address]  [facility] [address]       Dear Colleague,    Thank you for the opportunity to participate in the care of your patient, Pricilla Brown, at the [facility]. Please see a copy of my visit note below.    Atrial fibrillation    SOB (shortness of breath  Chronic diastolic heart failure    Dilated cardiomyopathy    Chronic atrial fibrillation    Cellulitis  Long-term  use of anticoagulants    Plantar fasciitis  Neuropathic pain of lower extremity  Peritonsillar abscess  Asthma  Azotemia  Hypothyroidism following radioiodine therapy  JYOTI (obstructive sleep apnea) CPAP Min Pressure of 13 and Max Pressure of 18  Chronic anticoagulation for a-fib  Morbid obesity    Diabetes mellitus, type 2    There is  interim history of increasing shortness of breath, orthopnea, PND, ankle edema, increasing abdominal girth, palpitation, pre-syncope, syncope, bleeding or thromboembolic complications.  The patient is taking medication regularly, following a low salt diet, and exercising regularly as outlined. Patient is utilizing JYOTI treatment.    REVIEW of SYMPTOMS    Constitutional: without fever, chills, night sweats.  Weight is increased  HEENT: without dry eyes, dry mouth, sinusitis, corryza, visual changes  Endocrine: without polyuria, polydypsia, polyphagia, heat or cold intolerance, changing mental status  Cardiology: without chest pain, tightness, heaviness, pressure, paroxysmal dyspnea, orthopnea, palpitation, pre-syncope or syncope or device discharge (if present)  Pulmonary: without asthma, wheezing, cough, hemoptysis  GI: without nausea, emesis, jaundice, pain, hematemesis, melena  : without frequency, urgency, hematuria, stones, pain, abnormal bleeding, frequency, urgency  Neurologic: without TIA, CVA, trauma, seizure  Dermatologic: without lesions, abrasion rash,   Orthopedic/Rheum: without significant joint  pain, impairment, limb, polyserositis, ulceration, Raynauds  Heme: without mass, bruising, frequent infection, anemia  Psychiatric: without substance abuse, hallucination, medication, depression    Exercise tolerance:    Alert, oriented, normal gait and station, normal mental, JVP within normal limits, HJR negative,thyroid not enlarged, mucous membranes john save fo basilar rale, abdomen without tenderness, rebound or guarding, skin clear of lesion, PMI normal, S1 S2 regular rhythm, no gallop, no edema    Wt Readings from Last 5 Encounters:   03/29/18 (!) 209.1 kg (460 lb 14.4 oz)   03/20/18 (!) 208.7 kg (460 lb)   02/28/18 (!) 209.6 kg (462 lb)   02/07/18 (!) 204.6 kg (451 lb)   02/07/18 (!) 204.6 kg (451 lb 1.6 oz)     Labs: pending  Current Outpatient Prescriptions   Medication     acetaminophen (TYLENOL) 325 MG tablet     albuterol (PROAIR HFA, PROVENTIL HFA, VENTOLIN HFA) 108 (90 BASE) MCG/ACT inhaler     bisacodyl (DULCOLAX) 10 MG suppository     blood glucose (NO BRAND SPECIFIED) lancets standard     blood glucose monitoring (NO BRAND SPECIFIED) meter device kit     blood glucose monitoring (NO BRAND SPECIFIED) test strip     Blood Glucose Monitoring Suppl (IBG STAR) W/DEVICE KIT     buPROPion (WELLBUTRIN SR) 100 MG 12 hr tablet     capsaicin (ZOSTRIX) 0.025 % CREA     ciclopirox 8 % SOLN     clotrimazole (LOTRIMIN) 1 % cream     colchicine 0.6 MG tablet     diclofenac (VOLTAREN) 1 % GEL topical gel     dulaglutide (TRULICITY) 1.5 MG/0.5ML pen     DULoxetine (CYMBALTA) 20 MG capsule     Efinaconazole 10 % SOLN     gabapentin (NEURONTIN) 300 MG capsule     hydrALAZINE (APRESOLINE) 25 MG tablet     insulin glargine U-300 (TOUJEO) 300 UNIT/ML injection     levothyroxine (SYNTHROID/LEVOTHROID) 200 MCG tablet     metoprolol (TOPROL-XL) 50 MG 24 hr tablet     morphine (MS CONTIN) 15 MG 12 hr tablet     nicotine ([employer] NICOTINE) 14 MG/24HR patch 2h hr     NOVOLOG FLEXPEN 100 UNIT/ML soln     nystatin (MYCOSTATIN)  cream     order for DME     order for DME     order for DME     ORDER FOR DME     oxyCODONE-acetaminophen (PERCOCET) 5-325 MG per tablet     polyethylene glycol (MIRALAX/GLYCOLAX) powder     potassium chloride SA (POTASSIUM CHLORIDE) 20 MEQ CR tablet     Respiratory Therapy Supplies (NEBULIZER COMPRESSOR) KIT     senna (SENOKOT) 8.6 MG tablet     spironolactone (ALDACTONE) 50 MG tablet     tiotropium (SPIRIVA HANDIHALER) 18 MCG inhalation capsule     topiramate (TOPAMAX) 25 MG tablet     torsemide (DEMADEX) 100 MG tablet     warfarin (COUMADIN) 5 MG tablet     No current facility-administered medications for this visit.      Assessment:    Blood pressure is increased and weight is up.  She has not be able to reliably take QID hydralazine    Patient reports decreasing efficacy of diuretics.  Will need to see labs before adjusting    1. Increase hydralazine to 25mgs am, 50mgs at noon, 75mgs in the evening.    2. Continue weight loss  3. Labs today: CMP, INR, hemoglobin  4. Referral: Neftali Smallwood for pool therapy: arthritis, weight loss  5. RTC for blood pressure and diuretic assessment  6. Bariatric surgery is the optimal treatment for this patient.  I would that with intense encouragement and frequent bariatric visits, patient can lose enough weight to qualify for surgery.    7. I wonder about the contribution of Cymbalta to her weight maintenance.  Bupropion may have less weight retentive actions, thought may not work as well in women.      Please do not hesitate to contact me if you have any questions/concerns.     Sincerely,     Percy Alcala MD

## 2018-05-29 NOTE — PATIENT INSTRUCTIONS
Dietitian today.   See Steph Kim in 2 months.   See Dr. Ascencio in 4 months.  Monthly dietitian visits with Paulette.     MEDICATION STARTED AT THIS APPOINTMENT  We are starting topiramate at bedtime.  Start one tab, 25 mg, for a week. Go up to 50 mg (2 tabs) for the next week. At the third week, take   3 tabs (75 mg).  Stay at 3 tabs until you are seen again. Call the nurse at [phone] if you have any questions or concerns. (Do not stop taking it if you don't think it's working. For some people it works even though they do not feel much different.)    Topiramate (Topamax) is a medication that is used most often to treat migraine headaches or for seizures. It has also been found to help with weight loss. Although it's not currently FDA approved for weight loss, it has been used safely for a number of years to help people who are carrying extra weight.     Just how topiramate helps with weight loss has not been exactly determined. However it seems to work on areas of the brain to quiet down signals related to eating.      Topiramate may make you:    >feel less interest in eating in between meals   >think less about food and eating   >find it easier to push the plate away   >find giving up pop easier    >have an easier time eating less    For some of our patients, the pills work right away. They feel and think quite differently about food. Other patients don't feel much of a change but find in fact they have lost weight! Like all weight loss medications, topiramate works best when you help it work.  This means:    1) Have less tempting high calorie (fattening) food around the house or office    2) Have lower calorie food (fruits, vegetables,low fat meats and dairy) for snacks    3) Eat out only one time or less each week.   4) Eat your meals at a table with the TV or computer off.    Side-effects. Topiramate is generally well tolerated. The main side-effects we see are:   Tingling in hands,feet, or face (usually  not very troublesome)   Mental confusion and word finding trouble (about 10% of patients have this.)     Feeling sleepy or a bit dopey- this goes away very soon after starting.    One of the dangers of topiramate is the possibility of birth defects--if you get pregnant when you are on it, there is the risk that your baby will be born with a cleft lip or palate.  If you are on topiramate and of child bearing age, you need to be on a reliable form of birth control or refrain from sexual intercourse.     Please refer to the pharmacy insert for more information on side-effects. Since many pharmacists are not familiar with the use of topiramate in weight loss, calling the clinic will get you the most accurate information on the use of this medication for weight loss.     In order to get refills of this or any medication we prescribe you must be seen in the medical weight mgmt clinic every 2-3 months. Please have your pharmacy fax a refill request to [phone].

## 2018-05-29 NOTE — NURSING NOTE
"  Chief Complaint   Patient presents with     Weight Problem     RMWM     Vitals:    05/29/18 0915   BP: 132/85   Pulse: 91   SpO2: 97%   Weight: (!) 466 lb 12.8 oz   Height: 5' 7\"     Body mass index is 73.11 kg/(m^2).   Patient medication list, drug allergies and history was reviewed today at Cardiology Clinic.  Mike Velázquez CMA    "

## 2018-05-29 NOTE — NURSING NOTE
Med Reconcile: Reviewed and verified all current medications with the patient. The updated medication list was printed and given to the patient.    Medication Change: Patient was educated regarding prescribed medication change (Hydralazine), including discussion of the indication, administration, side effects, and when to report to MD or RN. Patient demonstrated understanding of this information and agreed to call with further questions or concerns.  Patient was instructed to return for the next laboratory testing today .     Diet: Patient instructed regarding a heart healthy diet, including discussion of reduced fat and sodium intake. Patient demonstrated understanding of this information and agreed to call with further questions or concerns.    Return Appointment: Patient given instructions regarding scheduling next clinic visit. Patient demonstrated understanding of this information and agreed to call with further questions or concerns.    Patient stated she understood all health information given and agreed to call with further questions or concerns.    You were seen today in the Cardiovascular Clinic at the [facility].   Cardiology Providers you saw during your visit:    Dr. Percy Alcala    Recommendations:   1. Increase hydralazine to 25mg's in the am, 50mg's at noon, 75mg's in the evening.    2. Continue weight loss  3. Labs today: CMP, INR, hemoglobin  4. Referral: Neft[facility] Smallwood for pool therapy: arthritis, weight loss    Follow-up:   -Follow up with Dr. Alcala in 2 weeks

## 2018-05-29 NOTE — LETTER
"2018       RE: rPicilla Brown  [address]  [facility] [address]     Dear Colleague,    Thank you for referring your patient, Pricilla Brown, to the [facility] MEDICAL WEIGHT MANA[facility]. Please see a copy of my visit note below.    Return Medical Weight Management Note     Pricilla Brown  MRN:  [id number]  :  1975  JUANITO:  2018    Dear Dolores, Jamilah Ramirez,    I had the pleasure of seeing your patient Pricilla Brown.  She is a 43 year old female who I am continuing to see for treatment of obesity related to: type 2 diabetes, morbid obesity, dilated cardiomyopathy with diastolic dysfunction, atrial fibrillation, hx of PE, HTN, hypothyroidism s/p ROGER for Grave's disease and sleep apnea.     She sees Thelma Archibald for type 2 diabetes at Endocrine clinic. Last seen 2018.  She was seen by myself at [facility]. Last seen  and Steph Kim for evaluation of bariatric surgery, last seen 2017. However, she has struggling with prebariatric weight loss.     INTERVAL HISTORY:  Here for [facility] follow up.      She is currently on taking Trulicity 1.5 mg weekly, bupropion 100 mg bid, Tuojeo 170 units in the morning, Novolog 65 units plus correction. She was on topiramate up to 75 mg bid but stopped about 1 year ago. She felt it worked in the past and then she stopped taking it for a while. She could not tolerate metformin.     A1C 8.9 in 2018. She followed up with Thelma Archibald.    CURRENT WEIGHT:   466 lbs 12.8 oz    Wt Readings from Last 4 Encounters:   18 (!) 209.1 kg (460 lb 14.4 oz)   18 (!) 208.7 kg (460 lb)   18 (!) 209.6 kg (462 lb)   18 (!) 204.6 kg (451 lb)       Height:  5' 7\"  Body Mass Index:  Body mass index is 73.11 kg/(m^2).  Vitals:  /85  Pulse 91  Ht 1.702 m (5' 7\")  Wt (!) 211.7 kg (466 lb 12.8 oz)  SpO2 97%  BMI 73.11 kg/m2      Initial consult weight was 466 on bariatric consult.  Total lost 6 " pounds.    Review of Systems     Constitutional:  Positive for fatigue and increased energy. Negative for fever, chills, weight loss, weight gain, decreased appetite, night sweats, recent stressors, height loss, post-operative complications, incisional pain, hallucinations, hyperactivity and confused.   HENT:  Negative for ear pain, hearing loss, tinnitus, nosebleeds, trouble swallowing, hoarse voice, mouth sores, sore throat, ear discharge, tooth pain, gum tenderness, taste disturbance, smell disturbance, hearing aid, bleeding gums, dry mouth, sinus pain, sinus congestion and neck mass.    Eyes:  Positive for redness, eye watering and eye irritation. Negative for double vision, pain, eye pain, decreased vision, eye bulging, eye dryness, flashing lights, spots, floaters, strabismus, tunnel vision and jaundice.   Respiratory:   Positive for shortness of breath, sleep disturbances due to breathing, dyspnea on exertion and postural dyspnea. Negative for cough, hemoptysis, sputum production, wheezing, snores loudly, respiratory pain and cough disturbing sleep.    Cardiovascular:  Positive for chest pain, dyspnea on exertion, palpitations, orthopnea, leg swelling, hypertension, chest pain on exertion, leg pain, sleep disturbances due to breathing, tachycardia and edema. Negative for claudication, fingers/toes turn blue, hypotension, syncope, history of heart murmur, chest pain at rest, pacemaker, few scattered varicosities, light-headedness and exercise intolerance.   Gastrointestinal:  Negative for heartburn, nausea, vomiting, abdominal pain, diarrhea, constipation, blood in stool, melena, rectal pain, bloating, hemorrhoids, bowel incontinence, jaundice, rectal bleeding, coffee ground emesis and change in stool.   Genitourinary:  Negative for bladder incontinence, dysuria, urgency, hematuria, flank pain, vaginal discharge, difficulty urinating, genital sores, dyspareunia, decreased libido, nocturia, voiding less  frequently, arousal difficulty, abnormal vaginal bleeding, excessive menstruation, menstrual changes, hot flashes, vaginal dryness and postmenopausal bleeding.   Musculoskeletal:  Positive for back pain and muscle weakness. Negative for myalgias, joint swelling, arthralgias, stiffness, muscle cramps, neck pain, bone pain and fracture.   Skin:  Negative for nail changes, itching, poor wound healing, rash, hair changes, skin changes, acne, warts, poor wound healing, scarring, flaky skin, Raynaud's phenomenon, sensitivity to sunlight and skin thickening.   Neurological:  Negative for dizziness, tingling, tremors, speech change, seizures, loss of consciousness, weakness, light-headedness, numbness, headaches, disturbances in coordination, extremity numbness, memory loss, difficulty walking and paralysis.   Endo/Heme:  Negative for anemia, swollen glands and bruises/bleeds easily.   Psychiatric/Behavioral:  Negative for depression, hallucinations, memory loss, decreased concentration, mood swings and panic attacks.    Breast:  Negative for breast discharge, breast mass, breast pain and nipple retraction.   Endocrine:  Positive for polyphagia and polydipsia.Negative for altered temperature regulation, unwanted hair growth and change in facial hair.      MEDICATIONS:   Current Outpatient Prescriptions   Medication Sig Dispense Refill     acetaminophen (TYLENOL) 325 MG tablet Take 2 tablets (650 mg) by mouth 3 times daily as needed for mild pain or fever (total acetaminophen dose should not exceed 3000 mg per day) 180 tablet 5     albuterol (PROAIR HFA, PROVENTIL HFA, VENTOLIN HFA) 108 (90 BASE) MCG/ACT inhaler Inhale 2 puffs into the lungs every 6 hours as needed. 1 Inhaler 11     bisacodyl (DULCOLAX) 10 MG suppository Place 1 suppository (10 mg) rectally daily as needed for constipation 6 suppository 0     blood glucose (NO BRAND SPECIFIED) lancets standard Use to test blood sugar 5 times daily or as directed. 200 each 11      blood glucose monitoring (NO BRAND SPECIFIED) meter device kit Use to test blood sugar 3 times daily or as directed. 1 kit 1     blood glucose monitoring (NO BRAND SPECIFIED) test strip Use to test blood sugars 3 times daily or as directed 100 strip 11     Blood Glucose Monitoring Suppl (IBG STAR) W/DEVICE KIT -PLEASE GIVE PATIENT A DEVICE HER INSURANCE WILL COVER- 1 kit 0     buPROPion (WELLBUTRIN SR) 100 MG 12 hr tablet Take 1 tablet (100 mg) by mouth 2 times daily 180 tablet 1     capsaicin (ZOSTRIX) 0.025 % CREA Apply 1 g topically 3 times daily as needed 1 Tube 0     ciclopirox 8 % SOLN Externally apply topically daily To toenails. 1 Bottle 11     clotrimazole (LOTRIMIN) 1 % cream Apply topically 2 times daily 30 g 1     colchicine 0.6 MG tablet Take 1-2 tablets (0.6-1.2 mg) by mouth daily as needed for moderate pain 12 tablet 1     diclofenac (VOLTAREN) 1 % GEL topical gel Apply 4 grams to knees or 2 grams to hands four times daily using enclosed dosing card. 100 g 1     dulaglutide (TRULICITY) 1.5 MG/0.5ML pen Inject 1.5 mg Subcutaneous every 7 days 45 mL 3     DULoxetine (CYMBALTA) 20 MG capsule Take 1 capsule (20 mg) by mouth 2 times daily 60 capsule 0     Efinaconazole 10 % SOLN Externally apply topically daily To toenails. 8 mL 11     gabapentin (NEURONTIN) 300 MG capsule Take 2 capsules (600 mg) by mouth 2 times daily Needs PCP appt for further refills 120 capsule 0     hydrALAZINE (APRESOLINE) 25 MG tablet Take 1 tab (25 mg) in am, 2 tabs (50 mg) midday, and 3 tabs (75 mg) in pm daily 450 tablet 3     insulin glargine U-300 (TOUJEO) 300 UNIT/ML injection Inject 170 units SQ each am. 30 mL 3     levothyroxine (SYNTHROID/LEVOTHROID) 200 MCG tablet Take 1 tab Monday thru Saturday and 2 tabs on Sundays. 102 tablet 3     metoprolol (TOPROL-XL) 50 MG 24 hr tablet Take 0.5 tablets (25 mg) by mouth At Bedtime 90 tablet 3     morphine (MS CONTIN) 15 MG 12 hr tablet Take 15 mg by mouth daily as needed        nicotine ([employer] NICOTINE) 14 MG/24HR patch 2h hr Place 1 patch onto the skin every 24 hours 30 patch 1     NOVOLOG FLEXPEN 100 UNIT/ML soln Inject 65 units with meals plus correction. Pt uses approx 190 units in 24 hrs. 180 mL 3     nystatin (MYCOSTATIN) cream Apply topically 2 times daily To toenails 90 g 6     order for DME Left foot 1 Units 0     order for DME Equipment being ordered:  3724-7202 $92   Plantar, fasciitis, LG, night splint 1 each 0     order for DME Equipment being ordered: Challenger Wide walker if available - patient needs seat, basket and brakes. 1 each 0     ORDER FOR DME Use your CPAP device as directed by your provider. Pressure change to min 13 max 18cwp 1 each 99     oxyCODONE-acetaminophen (PERCOCET) 5-325 MG per tablet Take 1 tablet by mouth every 8 hours as needed for moderate to severe pain (try to limit use, no further prescriptions until seen in pain clinic) 60 tablet 0     polyethylene glycol (MIRALAX/GLYCOLAX) powder        potassium chloride SA (POTASSIUM CHLORIDE) 20 MEQ CR tablet Take 2 tablets (40 mEq) by mouth 4 times daily 240 tablet 3     Respiratory Therapy Supplies (NEBULIZER COMPRESSOR) KIT 1 Device 4 times daily as needed. 1 kit 3     senna (SENOKOT) 8.6 MG tablet Take 1 tablet by mouth 2 times daily 45 tablet 0     spironolactone (ALDACTONE) 50 MG tablet Take 1 tablet (50 mg) by mouth daily 30 tablet 11     tiotropium (SPIRIVA HANDIHALER) 18 MCG inhalation capsule Inhale contents of one capsule daily. 30 capsule 1     topiramate (TOPAMAX) 25 MG tablet Take 3 tablets (75 mg) by mouth 2 times daily 180 tablet 0     torsemide (DEMADEX) 100 MG tablet Take 1 tablet (100 mg) by mouth 2 times daily 180 tablet 1     warfarin (COUMADIN) 5 MG tablet Take 20 mg (four tabs) M and F and 15 mg (three tabs) all other days of the week or as directed by the Coumadin Clinic. 100 tablet 6     [DISCONTINUED] hydrALAZINE (APRESOLINE) 25 MG tablet Take 1 tab (25 mg) in am, 2 tabs (50 mg)  midday, and 2 tabs (50 mg) in pm daily 450 tablet 3       ASSESSMENT:   Pricilla Brown is a 43 years old female with hx of type 2 diabetes, morbid obesity, dilated cardiomyopathy with diastolic dysfunction, atrial fibrillation, hx of PE, HTN, hypothyroidism s/p ROGER for Grave's disease and sleep apnea who here for [facility] follow up.  She is interested in sleeve gastrectomy and struggling to lose 46 lbs before surgery.  She was on topiramate but stopped about a year ago as she did not feel it was working anymore.    +strong craving on sweet  Lack of exercise due to back pain, ankle pain      PLAN:   Restart topiramate titrate up to 75 mg daily (need high dose)  Refer back to dietitian -- need intense dietary plan -- will try modified liquid diet  Continue with Trulicity 1.5 mg weekly  F/u with Thelma Archibald for type 2 diabetes    Pool therapy (referal made by cardiologist)    FOLLOW-UP:    Dietitian monthly   2 months with Steph Kim  4 months with me    Time: 25 min spent on evaluation, management, counseling, education, & motivational interviewing with greater than 50 % of the total time was spent on counseling and coordinating care    Sincerely,    Silva Damon MD

## 2018-05-29 NOTE — MR AVS SNAPSHOT
After Visit Summary   5/29/2018    Pricilla Brown    MRN: [id number]           Patient Information     Date Of Birth          1975        Visit Information        Provider Department      5/29/2018 9:00 AM Silva Damon MD [facility] Medical Weight Management        Care Instructions    Dietitian today.   See Steph Kim in 2 months.   See Dr. Ascencio in 4 months.  Monthly dietitian visits with Paulette.     MEDICATION STARTED AT THIS APPOINTMENT  We are starting topiramate at bedtime.  Start one tab, 25 mg, for a week. Go up to 50 mg (2 tabs) for the next week. At the third week, take   3 tabs (75 mg).  Stay at 3 tabs until you are seen again. Call the nurse at [phone] if you have any questions or concerns. (Do not stop taking it if you don't think it's working. For some people it works even though they do not feel much different.)    Topiramate (Topamax) is a medication that is used most often to treat migraine headaches or for seizures. It has also been found to help with weight loss. Although it's not currently FDA approved for weight loss, it has been used safely for a number of years to help people who are carrying extra weight.     Just how topiramate helps with weight loss has not been exactly determined. However it seems to work on areas of the brain to quiet down signals related to eating.      Topiramate may make you:    >feel less interest in eating in between meals   >think less about food and eating   >find it easier to push the plate away   >find giving up pop easier    >have an easier time eating less    For some of our patients, the pills work right away. They feel and think quite differently about food. Other patients don't feel much of a change but find in fact they have lost weight! Like all weight loss medications, topiramate works best when you help it work.  This means:    1) Have less tempting high calorie (fattening) food around the house or office    2) Have  lower calorie food (fruits, vegetables,low fat meats and dairy) for snacks    3) Eat out only one time or less each week.   4) Eat your meals at a table with the TV or computer off.    Side-effects. Topiramate is generally well tolerated. The main side-effects we see are:   Tingling in hands,feet, or face (usually not very troublesome)   Mental confusion and word finding trouble (about 10% of patients have this.)     Feeling sleepy or a bit dopey- this goes away very soon after starting.    One of the dangers of topiramate is the possibility of birth defects--if you get pregnant when you are on it, there is the risk that your baby will be born with a cleft lip or palate.  If you are on topiramate and of child bearing age, you need to be on a reliable form of birth control or refrain from sexual intercourse.     Please refer to the pharmacy insert for more information on side-effects. Since many pharmacists are not familiar with the use of topiramate in weight loss, calling the clinic will get you the most accurate information on the use of this medication for weight loss.     In order to get refills of this or any medication we prescribe you must be seen in the medical weight mgmt clinic every 2-3 months. Please have your pharmacy fax a refill request to [phone].                  Follow-ups after your visit        Your next 10 appointments already scheduled     May 30, 2018 11:00 AM CDT   (Arrive by 10:45 AM)   NUTRITION VISIT with Paulette Ruiz RD   [facility] Surgical Weight Management ([facility])    [address]  4th [facility] [id number] [phone]            Jun 19, 2018  1:30 PM CDT   Return Sleep Patient with YOCASTA Horan CNP   [facility] [place], Sleep Study ([facility])    [address] [place] [id number] [phone]            Aug 03, 2018  3:00 PM CDT   (Arrive by 2:45 PM)  "  RETURN DIABETES with Isela Archibald PA-C   [facility] ([facility] [place])    [address] [address] [phone]              Who to contact     Please call your clinic at [phone] to:    Ask questions about your health    Make or cancel appointments    Discuss your medicines    Learn about your test results    Speak to your doctor            Additional Information About Your Visit        [email or web address] Information     [employer] gives you secure access to your electronic health record. If you see a primary care provider, you can also send messages to your care team and make appointments. If you have questions, please call your primary care clinic.  If you do not have a primary care provider, please call [phone] and they will assist you.      [employer] is an electronic gateway that provides easy, online access to your medical records. With [employer], you can request a clinic appointment, read your test results, renew a prescription or communicate with your care team.     To access your existing account, please contact your [facility] or call [phone] for assistance.        Care EveryWhere ID     This is your Care EveryWhere ID. This could be used by other organizations to access your [place] medical records  [id number]        Your Vitals Were     Pulse Height Pulse Oximetry BMI (Body Mass Index)          91 1.702 m (5' 7\") 97% 73.11 kg/m2         Blood Pressure from Last 3 Encounters:   05/29/18 132/85   05/29/18 132/85   03/20/18 124/80    Weight from Last 3 Encounters:   05/29/18 (!) 211.7 kg (466 lb 12.8 oz)   03/29/18 (!) 209.1 kg (460 lb 14.4 oz)   03/20/18 (!) 208.7 kg (460 lb)              Today, you had the following     No orders found for display         Today's Medication Changes          These changes are accurate as of 5/29/18  9:36 AM.  If you have any questions, ask your nurse or doctor.       "         These medicines have changed or have updated prescriptions.        Dose/Directions    hydrALAZINE 25 MG tablet   Commonly known as:  APRESOLINE   This may have changed:  additional instructions   Used for:  Dilated cardiomyopathy (H)   Changed by:  Percy Alcala MD        Take 1 tab (25 mg) in am, 2 tabs (50 mg) midday, and 3 tabs (75 mg) in pm daily   Quantity:  450 tablet   Refills:  3                Primary Care Provider Office Phone # Fax #    [place] [phone]       [address] [address]        Equal Access to Services     [facility]: Hadii miki ku hadasho Sorosmery, waaxda luqadaha, qaybta kaalmada galileoyamk, bola hurt . So [facility] [phone].    ATENCIÓN: Si habla español, tiene a de la rosa disposición servicios gratuitos de asistencia lingüística. [facility] [phone].    We comply with applicable federal civil rights laws and [place] laws. We do not discriminate on the basis of race, color, national origin, age, disability, sex, sexual orientation, or gender identity.            Thank you!     Thank you for choosing [facility] MEDICAL WEIGHT MANAGEMENT  for your care. Our goal is always to provide you with excellent care. Hearing back from our patients is one way we can continue to improve our services. Please take a few minutes to complete the written survey that you may receive in the mail after your visit with us. Thank you!             Your Updated Medication List - Protect others around you: Learn how to safely use, store and throw away your medicines at www.disposemymeds.org.          This list is accurate as of 5/29/18  9:36 AM.  Always use your most recent med list.                   Brand Name Dispense Instructions for use Diagnosis    acetaminophen 325 MG tablet    TYLENOL    180 tablet    Take 2 tablets (650 mg) by mouth 3 times daily as needed for mild pain or fever (total acetaminophen dose should  not exceed 3000 mg per day)    Neuropathic pain of lower extremity, unspecified laterality       albuterol 108 (90 Base) MCG/ACT Inhaler    PROAIR HFA/PROVENTIL HFA/VENTOLIN HFA    1 Inhaler    Inhale 2 puffs into the lungs every 6 hours as needed.    Reactive airway disease       bisacodyl 10 MG Suppository    DULCOLAX    6 suppository    Place 1 suppository (10 mg) rectally daily as needed for constipation    Constipation       blood glucose lancets standard    no brand specified    200 each    Use to test blood sugar 5 times daily or as directed.    Diabetes mellitus, type 2 (H)       blood glucose monitoring test strip    no brand specified    100 strip    Use to test blood sugars 3 times daily or as directed    Diabetes mellitus, type 2 (H)       buPROPion 100 MG 12 hr tablet    WELLBUTRIN SR    180 tablet    Take 1 tablet (100 mg) by mouth 2 times daily    Tobacco abuse       capsaicin 0.025 % Crea cream    ZOSTRIX    1 Tube    Apply 1 g topically 3 times daily as needed    Dermatophytosis of nail       ciclopirox 8 % Soln     1 Bottle    Externally apply topically daily To toenails.    Dermatophytosis of nail       clotrimazole 1 % cream    LOTRIMIN    30 g    Apply topically 2 times daily    Tinea pedis of right foot       colchicine 0.6 MG tablet    COLCYRS    12 tablet    Take 1-2 tablets (0.6-1.2 mg) by mouth daily as needed for moderate pain    Acute gouty arthritis       diclofenac 1 % Gel topical gel    VOLTAREN    100 g    Apply 4 grams to knees or 2 grams to hands four times daily using enclosed dosing card.    Left foot pain       dulaglutide 1.5 MG/0.5ML pen    TRULICITY    45 mL    Inject 1.5 mg Subcutaneous every 7 days    Type 2 diabetes mellitus with hyperglycemia, with long-term current use of insulin (H)       DULoxetine 20 MG EC capsule    CYMBALTA    60 capsule    Take 1 capsule (20 mg) by mouth 2 times daily    Depression       Efinaconazole 10 % Soln     8 mL    Externally apply topically  daily To toenails.    Dermatophytosis of nail       gabapentin 300 MG capsule    NEURONTIN    120 capsule    Take 2 capsules (600 mg) by mouth 2 times daily Needs PCP appt for further refills    Type 2 diabetes mellitus with hyperglycemia, with long-term current use of insulin (H)       hydrALAZINE 25 MG tablet    APRESOLINE    450 tablet    Take 1 tab (25 mg) in am, 2 tabs (50 mg) midday, and 3 tabs (75 mg) in pm daily    Dilated cardiomyopathy (H)       * iBG star w/Device Kit     1 kit    -PLEASE GIVE PATIENT A DEVICE HER INSURANCE WILL COVER-    Type 2 diabetes mellitus with hyperglycemia, with long-term current use of insulin (H)       * blood glucose monitoring meter device kit    no brand specified    1 kit    Use to test blood sugar 3 times daily or as directed.    Diabetes mellitus, type 2 (H)       insulin glargine U-300 300 UNIT/ML injection    TOUJEO    30 mL    Inject 170 units SQ each am.    Type 2 diabetes mellitus with hyperglycemia, with long-term current use of insulin (H)       levothyroxine 200 MCG tablet    SYNTHROID/LEVOTHROID    102 tablet    Take 1 tab Monday thru Saturday and 2 tabs on Sundays.    Other specified hypothyroidism       metoprolol succinate 50 MG 24 hr tablet    TOPROL-XL    90 tablet    Take 0.5 tablets (25 mg) by mouth At Bedtime    Chronic diastolic heart failure (H)       morphine 15 MG 12 hr tablet    MS CONTIN     Take 15 mg by mouth daily as needed        Nebulizer Compressor Kit     1 kit    1 Device 4 times daily as needed.    COPD (chronic obstructive pulmonary disease) (H)       nicotine 14 MG/24HR 24 hr patch    [employer] NICOTINE    30 patch    Place 1 patch onto the skin every 24 hours    Tobacco abuse       NovoLOG FLEXPEN 100 UNIT/ML injection   Generic drug:  insulin aspart     180 mL    Inject 65 units with meals plus correction. Pt uses approx 190 units in 24 hrs.    Type 2 diabetes mellitus with hyperglycemia, with long-term current use of insulin (H)        nystatin cream    MYCOSTATIN    90 g    Apply topically 2 times daily To toenails    Dermatophytosis of nail       order for DME     1 each    Use your CPAP device as directed by your provider. Pressure change to min 13 max 18cwp        * order for DME     1 each    Equipment being ordered: Challenger Wide walker if available - patient needs seat, basket and brakes.    Dilated cardiomyopathy (H), Chronic systolic heart failure (H)       * order for DME     1 each    Equipment being ordered: [id number] $92  Plantar, fasciitis, LG, night splint    Dermatophytosis of nail, Plantar fasciitis of right foot, Diabetic neuropathy with neurologic complication (H), Peroneal tendinitis, right       * order for DME     1 Units    Left foot    Left foot pain, Diabetic neuropathy with neurologic complication (H)       oxyCODONE-acetaminophen 5-325 MG per tablet    PERCOCET    60 tablet    Take 1 tablet by mouth every 8 hours as needed for moderate to severe pain (try to limit use, no further prescriptions until seen in pain clinic)    Lumbago, Bilateral low back pain with sciatica, sciatica laterality unspecified       polyethylene glycol powder    MIRALAX/GLYCOLAX          potassium chloride SA 20 MEQ CR tablet    KLOR-CON    240 tablet    Take 2 tablets (40 mEq) by mouth 4 times daily    Hypokalemia       senna 8.6 MG tablet    SENOKOT    45 tablet    Take 1 tablet by mouth 2 times daily    Constipation       spironolactone 50 MG tablet    ALDACTONE    30 tablet    Take 1 tablet (50 mg) by mouth daily        tiotropium 18 MCG capsule    SPIRIVA HANDIHALER    30 capsule    Inhale contents of one capsule daily.    COPD (chronic obstructive pulmonary disease) (H)       topiramate 25 MG tablet    TOPAMAX    180 tablet    Take 3 tablets (75 mg) by mouth 2 times daily    Morbid obesity, unspecified obesity type (H)       torsemide 100 MG tablet    DEMADEX    180 tablet    Take 1 tablet (100 mg) by mouth 2 times daily     Chronic diastolic heart failure (H)       warfarin 5 MG tablet    COUMADIN    100 tablet    Take 20 mg (four tabs) M and F and 15 mg (three tabs) all other days of the week or as directed by the Coumadin Clinic.    Long term current use of anticoagulant therapy       * Notice:  This list has 5 medication(s) that are the same as other medications prescribed for you. Read the directions carefully, and ask your doctor or other care provider to review them with you.

## 2018-05-29 NOTE — MR AVS SNAPSHOT
Pricilla Brown   5/29/2018   Anticoagulation Therapy Visit    Description:  43 year old female   Provider:  Demi Kebede, RN   Department:  [facility]           INR as of 5/29/2018     Today's INR 2.18      Anticoagulation Summary as of 5/29/2018     INR goal 2.0-2.5   Today's INR 2.18   Full warfarin instructions 20 mg on Mon, Fri; 15 mg all other days   Next INR check 6/26/2018    Indications   Atrial fibrillation (H) [I48.91] [I48.91]  Long-term (current) use of anticoagulants [Z79.01] [Z79.01]         May 2018 Details    Sun Mon Tue Wed Thu Fri Sat       1               2               3               4               5                 6               7               8               9               10               11               12                 13               14               15               16               17               18               19                 20               21               22               23               24               25               26                 27               28               29      15 mg   See details      30      15 mg         31      15 mg            Date Details   05/29 This INR check               How to take your warfarin dose     To take:  15 mg Take 3 of the 5 mg tablets.           June 2018 Details    Sun Mon Tue Wed Thu Fri Sat          1      20 mg         2      15 mg           3      15 mg         4      20 mg         5      15 mg         6      15 mg         7      15 mg         8      20 mg         9      15 mg           10      15 mg         11      20 mg         12      15 mg         13      15 mg         14      15 mg         15      20 mg         16      15 mg           17      15 mg         18      20 mg         19      15 mg         20      15 mg         21      15 mg         22      20 mg         23      15 mg           24      15 mg         25      20 mg         26            27               28               29               30                 Date Details   No additional details    Date of next INR:  6/26/2018         How to take your warfarin dose     To take:  15 mg Take 3 of the 5 mg tablets.    To take:  20 mg Take 4 of the 5 mg tablets.

## 2018-05-29 NOTE — MR AVS SNAPSHOT
MRN:[id number]                      After Visit Summary   5/29/2018    Pricilla Brown    MRN: [id number]           Visit Information        Provider Department      5/29/2018 9:30 AM Paulette Ruiz RD [facility] Surgical Weight Management        Your next 10 appointments already scheduled     Jun 19, 2018  1:30 PM CDT   Return Sleep Patient with YOCASTA Horan [facility] [place], Sleep Study ([facility])    [address] [place] [id number] [phone]            Aug 03, 2018  3:00 PM CDT   (Arrive by 2:45 PM)   RETURN DIABETES with SHANE Rojas [facility] ([facility])    [address] [address] [phone]              Care Instructions    GOALS:  Relating To Eating:  -Follow the Modified Liquid Diet for weight loss:  Breakfast: Protein Shake  Lunch: 3 oz lean protein (chicken/turkey breast or fish)  + non-starchy vegetables (non-starchy veg: green leafy vegetables, cucumbers, broccoli, cauliflower, green string beans)  Supper: Protein Shake  Snack: 1 protein bar + non-starchy vegetables (no calorie-containing dips/condiments)  Beverages: at least 48-64 oz water between meals daily (Powerade Zero or Propel Zero or Crystal Light or [place], less than 5 Calories per serving)    *Protein Shake Criteria: no more than 250 Calories, at least 20 grams of protein, and less than 10 grams of sugar     Frozen Meal Replacements  Healthy Choice  Lean Cuisine  Atkins Meals  Smart Ones    -Continue eatnig slowly (20-30 minutes per meal), chewing foods well (25 chews per bite/applesauce consistency)    -Increase exercise as able.  (Pool therapy, walking, etc)    -Continue to work on quitting smoking.            MyChart Information     [employer] gives you secure access to your electronic health record. If you see a primary care provider, you can also  send messages to your care team and make appointments. If you have questions, please call your primary care clinic.  If you do not have a primary care provider, please call [phone] and they will assist you.      [employer] is an electronic gateway that provides easy, online access to your medical records. With [employer] you can request a clinic appointment, read your test results, renew a prescription or communicate with your care team.     To access your existing account, please contact your [facility] or call [phone] for assistance.        Care EveryWhere ID     This is your Care EveryWhere ID. This could be used by other organizations to access your [place] medical records  [id number]        Equal Access to Services     KIERSTEN DRIVER : Maryam Woodward, malinda campos, ellen shah, bola walker. So [facility] [phone].    ATENCIÓN: Si habla español, tiene a de la rosa disposición servicios gratuitos de asistencia lingüística. Llame al [phone].    We comply with applicable federal civil rights laws and [place] laws. We do not discriminate on the basis of race, color, national origin, age, disability, sex, sexual orientation, or gender identity.

## 2018-05-29 NOTE — TELEPHONE ENCOUNTER
[facility] Call Center    Phone Message    May a detailed message be left on voicemail: no    Reason for Call: Other: Pt calling per office visit notes pt is to follow up with Dr. Alcala in 2 weeks, Providers schedule not open. Pt asking for a call back with schedule.      Action Taken: Message routed to:  [facility] ([facility]):  Cardiology

## 2018-05-29 NOTE — PATIENT INSTRUCTIONS
GOALS:  Relating To Eating:  -Follow the Modified Liquid Diet for weight loss:  Breakfast: Protein Shake  Lunch: 3 oz lean protein (chicken/turkey breast or fish)  + non-starchy vegetables (non-starchy veg: green leafy vegetables, cucumbers, broccoli, cauliflower, green string beans)  Supper: Protein Shake  Snack: 1 protein bar + non-starchy vegetables (no calorie-containing dips/condiments)  Beverages: at least 48-64 oz water between meals daily (Powerade Zero or Propel Zero or Crystal Light or [place], less than 5 Calories per serving)    *Protein Shake Criteria: no more than 250 Calories, at least 20 grams of protein, and less than 10 grams of sugar     Frozen Meal Replacements  Healthy Choice  Lean Cuisine  Atkins Meals  Smart Ones    -Continue eatnig slowly (20-30 minutes per meal), chewing foods well (25 chews per bite/applesauce consistency)    -Increase exercise as able.  (Pool therapy, walking, etc)    -Continue to work on quitting smoking.

## 2018-05-29 NOTE — PROGRESS NOTES
ANTICOAGULATION FOLLOW-UP CLINIC VISIT    Patient Name:  Pricilla Brown  Date:  5/29/2018  Contact Type:  Telephone    SUBJECTIVE:        OBJECTIVE    INR   Date Value Ref Range Status   05/29/2018 2.18 (H) 0.86 - 1.14 Final     Chromogenic Factor 10   Date Value Ref Range Status   04/08/2017 19 (L) 70 - 130 % Final     Comment:     Therapeutic Range:  A Chromogenic Factor 10 level of approximately 20-40%   inversely correlates with an INR of 2-3 for patients receiving Warfarin.   Chromogenic Factor 10 levels below 20% indicate an INR greater than 3 and   levels above 40% indicate an INR less than 2.         ASSESSMENT / PLAN  INR assessment THER    Recheck INR In: 4 WEEKS    INR Location Clinic      Anticoagulation Summary as of 5/29/2018     INR goal 2.0-2.5   Today's INR 2.18   Warfarin maintenance plan 20 mg (5 mg x 4) on Mon, Fri; 15 mg (5 mg x 3) all other days   Full warfarin instructions 20 mg on Mon, Fri; 15 mg all other days   Weekly warfarin total 115 mg   Plan last modified Velvet Asencio RN (3/15/2017)   Next INR check 6/26/2018   Priority INR   Target end date Indefinite    Indications   Atrial fibrillation (H) [I48.91] [I48.91]  Long-term (current) use of anticoagulants [Z79.01] [Z79.01]         Anticoagulation Episode Summary     INR check location Clinic Lab    Preferred lab     Send INR reminders to [facility]    Comments Contact Patient at [phone]      Anticoagulation Care Providers     Provider Role Specialty Phone number    Percy Alcala MD  Cardiology [phone]            See the Encounter Report to view Anticoagulation Flowsheet and Dosing Calendar (Go to Encounters tab in chart review, and find the Anticoagulation Therapy Visit)    Left message for patient with results and dosing recommendations. Asked patient to call back to report any missed doses, falls, signs and symptoms of bleeding or clotting, any changes in health, medication, or diet. Asked patient to  call back with any questions or concerns.     Demi Kebede RN

## 2018-05-29 NOTE — PROGRESS NOTES
"Return Medical Weight Management Note     Pricilla Brown  MRN:  [id number]  :  1975  JUANITO:  2018    Dear Dolores, Jamilah Ramirez,    I had the pleasure of seeing your patient Pricilla Brown.  She is a 43 year old female who I am continuing to see for treatment of obesity related to: type 2 diabetes, morbid obesity, dilated cardiomyopathy with diastolic dysfunction, atrial fibrillation, hx of PE, HTN, hypothyroidism s/p ROGER for Grave's disease and sleep apnea.     She sees Thelma Archibald for type 2 diabetes at [facility]. Last seen 2018.  She was seen by myself at [facility]. Last seen  and Steph Kim for evaluation of bariatric surgery, last seen 2017. However, she has struggling with prebariatric weight loss.     INTERVAL HISTORY:  Here for [facility] follow up.      She is currently on taking Trulicity 1.5 mg weekly, bupropion 100 mg bid, Tuojeo 170 units in the morning, Novolog 65 units plus correction. She was on topiramate up to 75 mg bid but stopped about 1 year ago. She felt it worked in the past and then she stopped taking it for a while. She could not tolerate metformin.     A1C 8.9 in 2018. She followed up with Thelma Archibald.    CURRENT WEIGHT:   466 lbs 12.8 oz    Wt Readings from Last 4 Encounters:   18 (!) 209.1 kg (460 lb 14.4 oz)   18 (!) 208.7 kg (460 lb)   18 (!) 209.6 kg (462 lb)   18 (!) 204.6 kg (451 lb)       Height:  5' 7\"  Body Mass Index:  Body mass index is 73.11 kg/(m^2).  Vitals:  /85  Pulse 91  Ht 1.702 m (5' 7\")  Wt (!) 211.7 kg (466 lb 12.8 oz)  SpO2 97%  BMI 73.11 kg/m2      Initial consult weight was 466 on bariatric consult.  Total lost 6 pounds.    Review of Systems     Constitutional:  Positive for fatigue and increased energy. Negative for fever, chills, weight loss, weight gain, decreased appetite, night sweats, recent stressors, height loss, post-operative complications, incisional pain, hallucinations, hyperactivity and confused. "   HENT:  Negative for ear pain, hearing loss, tinnitus, nosebleeds, trouble swallowing, hoarse voice, mouth sores, sore throat, ear discharge, tooth pain, gum tenderness, taste disturbance, smell disturbance, hearing aid, bleeding gums, dry mouth, sinus pain, sinus congestion and neck mass.    Eyes:  Positive for redness, eye watering and eye irritation. Negative for double vision, pain, eye pain, decreased vision, eye bulging, eye dryness, flashing lights, spots, floaters, strabismus, tunnel vision and jaundice.   Respiratory:   Positive for shortness of breath, sleep disturbances due to breathing, dyspnea on exertion and postural dyspnea. Negative for cough, hemoptysis, sputum production, wheezing, snores loudly, respiratory pain and cough disturbing sleep.    Cardiovascular:  Positive for chest pain, dyspnea on exertion, palpitations, orthopnea, leg swelling, hypertension, chest pain on exertion, leg pain, sleep disturbances due to breathing, tachycardia and edema. Negative for claudication, fingers/toes turn blue, hypotension, syncope, history of heart murmur, chest pain at rest, pacemaker, few scattered varicosities, light-headedness and exercise intolerance.   Gastrointestinal:  Negative for heartburn, nausea, vomiting, abdominal pain, diarrhea, constipation, blood in stool, melena, rectal pain, bloating, hemorrhoids, bowel incontinence, jaundice, rectal bleeding, coffee ground emesis and change in stool.   Genitourinary:  Negative for bladder incontinence, dysuria, urgency, hematuria, flank pain, vaginal discharge, difficulty urinating, genital sores, dyspareunia, decreased libido, nocturia, voiding less frequently, arousal difficulty, abnormal vaginal bleeding, excessive menstruation, menstrual changes, hot flashes, vaginal dryness and postmenopausal bleeding.   Musculoskeletal:  Positive for back pain and muscle weakness. Negative for myalgias, joint swelling, arthralgias, stiffness, muscle cramps, neck  pain, bone pain and fracture.   Skin:  Negative for nail changes, itching, poor wound healing, rash, hair changes, skin changes, acne, warts, poor wound healing, scarring, flaky skin, Raynaud's phenomenon, sensitivity to sunlight and skin thickening.   Neurological:  Negative for dizziness, tingling, tremors, speech change, seizures, loss of consciousness, weakness, light-headedness, numbness, headaches, disturbances in coordination, extremity numbness, memory loss, difficulty walking and paralysis.   Endo/Heme:  Negative for anemia, swollen glands and bruises/bleeds easily.   Psychiatric/Behavioral:  Negative for depression, hallucinations, memory loss, decreased concentration, mood swings and panic attacks.    Breast:  Negative for breast discharge, breast mass, breast pain and nipple retraction.   Endocrine:  Positive for polyphagia and polydipsia.Negative for altered temperature regulation, unwanted hair growth and change in facial hair.      MEDICATIONS:   Current Outpatient Prescriptions   Medication Sig Dispense Refill     acetaminophen (TYLENOL) 325 MG tablet Take 2 tablets (650 mg) by mouth 3 times daily as needed for mild pain or fever (total acetaminophen dose should not exceed 3000 mg per day) 180 tablet 5     albuterol (PROAIR HFA, PROVENTIL HFA, VENTOLIN HFA) 108 (90 BASE) MCG/ACT inhaler Inhale 2 puffs into the lungs every 6 hours as needed. 1 Inhaler 11     bisacodyl (DULCOLAX) 10 MG suppository Place 1 suppository (10 mg) rectally daily as needed for constipation 6 suppository 0     blood glucose (NO BRAND SPECIFIED) lancets standard Use to test blood sugar 5 times daily or as directed. 200 each 11     blood glucose monitoring (NO BRAND SPECIFIED) meter device kit Use to test blood sugar 3 times daily or as directed. 1 kit 1     blood glucose monitoring (NO BRAND SPECIFIED) test strip Use to test blood sugars 3 times daily or as directed 100 strip 11     Blood Glucose Monitoring Suppl (IBG STAR)  W/DEVICE KIT -PLEASE GIVE PATIENT A DEVICE HER INSURANCE WILL COVER- 1 kit 0     buPROPion (WELLBUTRIN SR) 100 MG 12 hr tablet Take 1 tablet (100 mg) by mouth 2 times daily 180 tablet 1     capsaicin (ZOSTRIX) 0.025 % CREA Apply 1 g topically 3 times daily as needed 1 Tube 0     ciclopirox 8 % SOLN Externally apply topically daily To toenails. 1 Bottle 11     clotrimazole (LOTRIMIN) 1 % cream Apply topically 2 times daily 30 g 1     colchicine 0.6 MG tablet Take 1-2 tablets (0.6-1.2 mg) by mouth daily as needed for moderate pain 12 tablet 1     diclofenac (VOLTAREN) 1 % GEL topical gel Apply 4 grams to knees or 2 grams to hands four times daily using enclosed dosing card. 100 g 1     dulaglutide (TRULICITY) 1.5 MG/0.5ML pen Inject 1.5 mg Subcutaneous every 7 days 45 mL 3     DULoxetine (CYMBALTA) 20 MG capsule Take 1 capsule (20 mg) by mouth 2 times daily 60 capsule 0     Efinaconazole 10 % SOLN Externally apply topically daily To toenails. 8 mL 11     gabapentin (NEURONTIN) 300 MG capsule Take 2 capsules (600 mg) by mouth 2 times daily Needs PCP appt for further refills 120 capsule 0     hydrALAZINE (APRESOLINE) 25 MG tablet Take 1 tab (25 mg) in am, 2 tabs (50 mg) midday, and 3 tabs (75 mg) in pm daily 450 tablet 3     insulin glargine U-300 (TOUJEO) 300 UNIT/ML injection Inject 170 units SQ each am. 30 mL 3     levothyroxine (SYNTHROID/LEVOTHROID) 200 MCG tablet Take 1 tab Monday thru Saturday and 2 tabs on Sundays. 102 tablet 3     metoprolol (TOPROL-XL) 50 MG 24 hr tablet Take 0.5 tablets (25 mg) by mouth At Bedtime 90 tablet 3     morphine (MS CONTIN) 15 MG 12 hr tablet Take 15 mg by mouth daily as needed       nicotine ([employer] NICOTINE) 14 MG/24HR patch 2h hr Place 1 patch onto the skin every 24 hours 30 patch 1     NOVOLOG FLEXPEN 100 UNIT/ML soln Inject 65 units with meals plus correction. Pt uses approx 190 units in 24 hrs. 180 mL 3     nystatin (MYCOSTATIN) cream Apply topically 2 times daily To toenails 90  g 6     order for DME Left foot 1 Units 0     order for DME Equipment being ordered: [id number] $92   Plantar, fasciitis, LG, night splint 1 each 0     order for DME Equipment being ordered: Challenger Wide walker if available - patient needs seat, basket and brakes. 1 each 0     ORDER FOR DME Use your CPAP device as directed by your provider. Pressure change to min 13 max 18cwp 1 each 99     oxyCODONE-acetaminophen (PERCOCET) 5-325 MG per tablet Take 1 tablet by mouth every 8 hours as needed for moderate to severe pain (try to limit use, no further prescriptions until seen in pain clinic) 60 tablet 0     polyethylene glycol (MIRALAX/GLYCOLAX) powder        potassium chloride SA (POTASSIUM CHLORIDE) 20 MEQ CR tablet Take 2 tablets (40 mEq) by mouth 4 times daily 240 tablet 3     Respiratory Therapy Supplies (NEBULIZER COMPRESSOR) KIT 1 Device 4 times daily as needed. 1 kit 3     senna (SENOKOT) 8.6 MG tablet Take 1 tablet by mouth 2 times daily 45 tablet 0     spironolactone (ALDACTONE) 50 MG tablet Take 1 tablet (50 mg) by mouth daily 30 tablet 11     tiotropium (SPIRIVA HANDIHALER) 18 MCG inhalation capsule Inhale contents of one capsule daily. 30 capsule 1     topiramate (TOPAMAX) 25 MG tablet Take 3 tablets (75 mg) by mouth 2 times daily 180 tablet 0     torsemide (DEMADEX) 100 MG tablet Take 1 tablet (100 mg) by mouth 2 times daily 180 tablet 1     warfarin (COUMADIN) 5 MG tablet Take 20 mg (four tabs) M and F and 15 mg (three tabs) all other days of the week or as directed by the Coumadin Clinic. 100 tablet 6     [DISCONTINUED] hydrALAZINE (APRESOLINE) 25 MG tablet Take 1 tab (25 mg) in am, 2 tabs (50 mg) midday, and 2 tabs (50 mg) in pm daily 450 tablet 3       ASSESSMENT:   Pricilla Brown is a 43 years old female with hx of type 2 diabetes, morbid obesity, dilated cardiomyopathy with diastolic dysfunction, atrial fibrillation, hx of PE, HTN, hypothyroidism s/p ROGER for Grave's disease and sleep apnea who  here for MW follow up.  She is interested in sleeve gastrectomy and struggling to lose 46 lbs before surgery.  She was on topiramate but stopped about a year ago as she did not feel it was working anymore.    +strong craving on sweet  Lack of exercise due to back pain, ankle pain      PLAN:   Restart topiramate titrate up to 75 mg daily (need high dose)  Refer back to dietitian -- need intense dietary plan -- will try modified liquid diet  Continue with Trulicity 1.5 mg weekly  F/u with Thelma Archibald for type 2 diabetes    Pool therapy (referal made by cardiologist)    FOLLOW-UP:    Dietitian monthly   2 months with Steph Kim  4 months with me    Time: 25 min spent on evaluation, management, counseling, education, & motivational interviewing with greater than 50 % of the total time was spent on counseling and coordinating care    Sincerely,    Silva Damon MD

## 2018-05-29 NOTE — PROGRESS NOTES
Atrial fibrillation    SOB (shortness of breath  Chronic diastolic heart failure    Dilated cardiomyopathy    Chronic atrial fibrillation    Cellulitis  Long-term  use of anticoagulants    Plantar fasciitis  Neuropathic pain of lower extremity  Peritonsillar abscess  Asthma  Azotemia  Hypothyroidism following radioiodine therapy  JYOTI (obstructive sleep apnea) CPAP Min Pressure of 13 and Max Pressure of 18  Chronic anticoagulation for a-fib  Morbid obesity    Diabetes mellitus, type 2    There is  interim history of increasing shortness of breath, orthopnea, PND, ankle edema, increasing abdominal girth, palpitation, pre-syncope, syncope, bleeding or thromboembolic complications.  The patient is taking medication regularly, following a low salt diet, and exercising regularly as outlined. Patient is utilizing JYOTI treatment.    REVIEW of SYMPTOMS    Constitutional: without fever, chills, night sweats.  Weight is increased  HEENT: without dry eyes, dry mouth, sinusitis, corryza, visual changes  Endocrine: without polyuria, polydypsia, polyphagia, heat or cold intolerance, changing mental status  Cardiology: without chest pain, tightness, heaviness, pressure, paroxysmal dyspnea, orthopnea, palpitation, pre-syncope or syncope or device discharge (if present)  Pulmonary: without asthma, wheezing, cough, hemoptysis  GI: without nausea, emesis, jaundice, pain, hematemesis, melena  : without frequency, urgency, hematuria, stones, pain, abnormal bleeding, frequency, urgency  Neurologic: without TIA, CVA, trauma, seizure  Dermatologic: without lesions, abrasion rash,   Orthopedic/Rheum: without significant joint pain, impairment, limb, polyserositis, ulceration, Raynauds  Heme: without mass, bruising, frequent infection, anemia  Psychiatric: without substance abuse, hallucination, medication, depression    Exercise tolerance:    Alert, oriented, normal gait and station, normal mental, JVP within normal limits, HJR  negative,thyroid not enlarged, mucous membranes john save fo basilar rale, abdomen without tenderness, rebound or guarding, skin clear of lesion, PMI normal, S1 S2 regular rhythm, no gallop, no edema    Wt Readings from Last 5 Encounters:   03/29/18 (!) 209.1 kg (460 lb 14.4 oz)   03/20/18 (!) 208.7 kg (460 lb)   02/28/18 (!) 209.6 kg (462 lb)   02/07/18 (!) 204.6 kg (451 lb)   02/07/18 (!) 204.6 kg (451 lb 1.6 oz)     Labs: pending  Current Outpatient Prescriptions   Medication     acetaminophen (TYLENOL) 325 MG tablet     albuterol (PROAIR HFA, PROVENTIL HFA, VENTOLIN HFA) 108 (90 BASE) MCG/ACT inhaler     bisacodyl (DULCOLAX) 10 MG suppository     blood glucose (NO BRAND SPECIFIED) lancets standard     blood glucose monitoring (NO BRAND SPECIFIED) meter device kit     blood glucose monitoring (NO BRAND SPECIFIED) test strip     Blood Glucose Monitoring Suppl (IBG STAR) W/DEVICE KIT     buPROPion (WELLBUTRIN SR) 100 MG 12 hr tablet     capsaicin (ZOSTRIX) 0.025 % CREA     ciclopirox 8 % SOLN     clotrimazole (LOTRIMIN) 1 % cream     colchicine 0.6 MG tablet     diclofenac (VOLTAREN) 1 % GEL topical gel     dulaglutide (TRULICITY) 1.5 MG/0.5ML pen     DULoxetine (CYMBALTA) 20 MG capsule     Efinaconazole 10 % SOLN     gabapentin (NEURONTIN) 300 MG capsule     hydrALAZINE (APRESOLINE) 25 MG tablet     insulin glargine U-300 (TOUJEO) 300 UNIT/ML injection     levothyroxine (SYNTHROID/LEVOTHROID) 200 MCG tablet     metoprolol (TOPROL-XL) 50 MG 24 hr tablet     morphine (MS CONTIN) 15 MG 12 hr tablet     nicotine ([employer] NICOTINE) 14 MG/24HR patch 2h hr     NOVOLOG FLEXPEN 100 UNIT/ML soln     nystatin (MYCOSTATIN) cream     order for DME     order for DME     order for DME     ORDER FOR DME     oxyCODONE-acetaminophen (PERCOCET) 5-325 MG per tablet     polyethylene glycol (MIRALAX/GLYCOLAX) powder     potassium chloride SA (POTASSIUM CHLORIDE) 20 MEQ CR tablet     Respiratory Therapy Supplies (NEBULIZER COMPRESSOR) KIT      senna (SENOKOT) 8.6 MG tablet     spironolactone (ALDACTONE) 50 MG tablet     tiotropium (SPIRIVA HANDIHALER) 18 MCG inhalation capsule     topiramate (TOPAMAX) 25 MG tablet     torsemide (DEMADEX) 100 MG tablet     warfarin (COUMADIN) 5 MG tablet     No current facility-administered medications for this visit.      Assessment:    Blood pressure is increased and weight is up.  She has not be able to reliably take QID hydralazine    Patient reports decreasing efficacy of diuretics.  Will need to see labs before adjusting    1. Increase hydralazine to 25mgs am, 50mgs at noon, 75mgs in the evening.    2. Continue weight loss  3. Labs today: CMP, INR, hemoglobin  4. Referral: Neftali Smallwood for pool therapy: arthritis, weight loss  5. RTC for blood pressure and diuretic assessment  6. Bariatric surgery is the optimal treatment for this patient.  I would that with intense encouragement and frequent bariatric visits, patient can lose enough weight to qualify for surgery.    7. I wonder about the contribution of Cymbalta to her weight maintenance.  Bupropion may have less weight retentive actions, thought may not work as well in women.

## 2018-05-29 NOTE — PATIENT INSTRUCTIONS
You were seen today in the Cardiovascular Clinic at the [facility].   Cardiology Providers you saw during your visit:    Dr. Percy Alcala    Recommendations:   1. Increase hydralazine to 25mg's in the am, 50mg's at noon, 75mg's in the evening.    2. Continue weight loss  3. Labs today: CMP, INR, hemoglobin  4. Referral: Neftali Smallwood for pool therapy: arthritis, weight loss    Follow-up:   -Follow up with Dr. Alcala in 2 weeks    For emergencies call 911.     If you have any questions regarding your visit please contact your care team:     Alexandru Wang RN  [facility]  Cardiology Care Coordinator-Heart Failure    Appointment scheduling or nurse questions: [phone]    On Call Cardiologist for after hours or on weekends: [phone]    option #4    If you need a medication refill please contact your pharmacy.  Please allow 3 business days for your refill to be completed.    As always, thank you for trusting us with your health care needs!

## 2018-05-29 NOTE — NURSING NOTE
Chief Complaint   Patient presents with     Follow Up For      A-Fib, diastolic HF, Dilated CMY, Cellulitis, Neuropathy, Asthma, Hypothyroidism, JYOTI w/ CPAP, Morbid obesity, DM II     Vitals were taken and medications were reconciled.     Barbara Macdonald,HEATHER  8:25 AM

## 2018-05-29 NOTE — PROGRESS NOTES
"  Bariatric Nutrition Consultation Note    Reason For Visit: Nutrition Reassessment    Pricilla Brown is a 43 year-old (type 2 DM on insulin sliding scale) presenting today for bariatric nutrition consult.  Pt is interested in laparoscopic sleeve gastrectomy.  Pt has met all required RD visits prior to surgery.  Pt was referred by NANETTE Kerr and Dr. Silva Damon.    Coordination Notes:,   5/29/18: In light of it being several months before anticipating surgery, writer will revisit task list when Pt is closer to weight loss goal.  Writer will continue to encourage Pt to come at least monthly to see RD.    ANTHROPOMETRICS:  Ht: 67\"  Initial Wt: 462.6 lbs with BMI of 72.6  Current Wt: 466.8 lbs with BMI of 73.11  (+4.2 lbs from initial weight)     Required weight loss goal pre-op: -46 lbs from initial consult weight (goal weight 416.6 lbs or less before surgery)    NUTRITION HISTORY:  Food Allergies/Intolerances: none known  Cravings: sweets, chips, pop (diet)  Triggers/cues causing extra eating: boredom (currently on disability, not working)  *Pt to restart topiramate today and purchase our meal replacements per Dr. Silva Damon who would like Pt to re-try modified liquid diet with 2 shakes/day.    Progress with Previous Goals:  Relating To Eating:  -Track food intake prior to eating on [email or web address].com with a target of no more than 1,800 Calories for 2 lbs/week weight loss, 2400 mg sodium or less/day would be great.- Not meeting.    *Have 1 protein meal replacement shake daily (Premier Protein). - Meeting for breakfast.   *Eat as much non-starchy vegetables as you need to fill you up (no calorie-containing dips/condiments).- Pt has been working on this, but lately has been going for popcorn and granola bars and cereal instead of vegetables.      (non-starchy veg: green leafy vegetables, cucumbers, broccoli, cauliflower, green string beans)   *Have 1 fruit daily. - Meeting for the most part; " sometimes 2 fruits/day.    *Drink at least 64 oz water between meals daily- Meeting.    -Eat slowly (20-30 minutes per meal), chewing foods well (25 chews per bite/applesauce consistency) - Meeting every day.  She is proud of this.     -Increase exercise as able.  (Pool therapy, walking, etc) - She has been trying to walk more, but not when it is too hot.  She has removed some of her chairs in her home to get her to move more rather than sit.     -Continue to work on quitting smoking.  - She is still working on this (down to 3-5 cigarettes/day).  She is thinking about going on the patch.     NUTRITION DIAGNOSIS:  Obesity r/t long history of self-monitoring deficit and excessive energy intake aeb BMI >30.- continues    INTERVENTION:  Intervention Provided/Education Provided:  Discussed potential barriers to weight loss.  Praised Pt on weight lost over the past month.  Reviewed previous goals.  Patient would like to try 2 protein shakes per day of the very high protein category available in clinic (250 Jagjit, 35 g protein).  Encouraged exercise.  Gave encouragement and support.  Provided Pt with list of goals and task list.      Note: Pt purchased today the followin shakes/day (250 Jagjit, 35 g protein) + 1 bar/day (160 Jagjit, 15 g protein) for a total of 2 weeks.    Patient Understanding: good  Expected Compliance: good      GOALS:  Relating To Eating:  -Follow the Modified Liquid Diet for weight loss:  Breakfast: Protein Shake (250 Jagjit, 35 g protein)  Lunch: 3 oz lean protein (chicken/turkey breast or fish)  + non-starchy vegetables (non-starchy veg: green leafy vegetables, cucumbers, broccoli, cauliflower, green string beans)  Supper: Protein Shake (250 Jagjit, 35 g protein)  Snack: 1 protein bar + non-starchy vegetables (no calorie-containing dips/condiments)  Beverages: at least 48-64 oz water between meals daily (Powerade Zero or Propel Zero or Crystal Light or Isael, less than 5 Calories per serving)    *Protein Shake  Criteria: no more than 250 Calories, at least 20 grams of protein, and less than 10 grams of sugar     Frozen Meal Replacements  Healthy Choice  Lean Cuisine  Atkins Meals  Smart Ones    -Continue eatnig slowly (20-30 minutes per meal), chewing foods well (25 chews per bite/applesauce consistency)    -Increase exercise as able.  (Pool therapy, walking, etc)    -Continue to work on quitting smoking.       Follow-Up: follow-up with RD in 1 month.  Pt to return in 2 weeks for a weight check and to  more meal replacements or bars.     Time spent with patient: 30 minutes     Paulette Ruiz MS, GANGA, MARLAN, CLT  Pager: [phone]    -------------  Addendum:   Writer was informed by Tonia who checked Pt out that Pt opted not to purchase the shakes and bars today.      Paulette Ruiz MS, GANGA, DEVAN, CLT  Pager: [phone]

## 2018-05-30 DIAGNOSIS — Z79.4 TYPE 2 DIABETES MELLITUS WITH HYPERGLYCEMIA, WITH LONG-TERM CURRENT USE OF INSULIN (H): ICD-10-CM

## 2018-05-30 DIAGNOSIS — E11.65 TYPE 2 DIABETES MELLITUS WITH HYPERGLYCEMIA, WITH LONG-TERM CURRENT USE OF INSULIN (H): ICD-10-CM

## 2018-05-30 NOTE — TELEPHONE ENCOUNTER
gabapentin (NEURONTIN) 300 MG capsule  Last Written Prescription Date:  4/2/18  Last Fill Quantity: 120,   # refills: 0  Last Office Visit : 4/2/18  Future Office visit:  7/27/18 with Steph Kim, 10/9/18 with Dr. Ascencio    Routing refill request to provider for review/approval because:  Drug not on the [facility], [facility] or [facility] refill protocol or controlled substance

## 2018-05-31 RX ORDER — GABAPENTIN 300 MG/1
600 CAPSULE ORAL 2 TIMES DAILY
Qty: 120 CAPSULE | Refills: 0 | OUTPATIENT
Start: 2018-05-31

## 2018-06-08 ENCOUNTER — PRE VISIT (OUTPATIENT)
Dept: CARDIOLOGY | Facility: CLINIC | Age: 43
End: 2018-06-08

## 2018-06-08 DIAGNOSIS — I50.22 CHRONIC SYSTOLIC HEART FAILURE (H): Primary | ICD-10-CM

## 2018-06-13 ENCOUNTER — ANTICOAGULATION THERAPY VISIT (OUTPATIENT)
Dept: ANTICOAGULATION | Facility: CLINIC | Age: 43
End: 2018-06-13

## 2018-06-13 ENCOUNTER — OFFICE VISIT (OUTPATIENT)
Dept: OPHTHALMOLOGY | Facility: CLINIC | Age: 43
End: 2018-06-13
Payer: MEDICARE

## 2018-06-13 ENCOUNTER — OFFICE VISIT (OUTPATIENT)
Dept: CARDIOLOGY | Facility: CLINIC | Age: 43
End: 2018-06-13
Attending: OPTOMETRIST
Payer: MEDICARE

## 2018-06-13 VITALS
BODY MASS INDEX: 45.99 KG/M2 | SYSTOLIC BLOOD PRESSURE: 104 MMHG | HEART RATE: 136 BPM | WEIGHT: 293 LBS | HEIGHT: 67 IN | DIASTOLIC BLOOD PRESSURE: 51 MMHG | OXYGEN SATURATION: 93 %

## 2018-06-13 DIAGNOSIS — Z79.01 LONG-TERM (CURRENT) USE OF ANTICOAGULANTS: ICD-10-CM

## 2018-06-13 DIAGNOSIS — I48.91 ATRIAL FIBRILLATION (H): ICD-10-CM

## 2018-06-13 DIAGNOSIS — H47.10 EDEMA OF OPTIC NERVE: ICD-10-CM

## 2018-06-13 DIAGNOSIS — E11.9 TYPE 2 DIABETES MELLITUS WITHOUT RETINOPATHY (H): Primary | ICD-10-CM

## 2018-06-13 DIAGNOSIS — R00.1 BRADYCARDIA: Primary | ICD-10-CM

## 2018-06-13 DIAGNOSIS — H52.13 MYOPIA OF BOTH EYES: ICD-10-CM

## 2018-06-13 DIAGNOSIS — I50.32 CHRONIC DIASTOLIC HEART FAILURE (H): ICD-10-CM

## 2018-06-13 DIAGNOSIS — I50.22 CHRONIC SYSTOLIC HEART FAILURE (H): ICD-10-CM

## 2018-06-13 DIAGNOSIS — H35.033 HYPERTENSIVE RETINOPATHY OF BOTH EYES: ICD-10-CM

## 2018-06-13 DIAGNOSIS — H10.13 ALLERGIC CONJUNCTIVITIS OF BOTH EYES: ICD-10-CM

## 2018-06-13 LAB
ANION GAP SERPL CALCULATED.3IONS-SCNC: 9 MMOL/L (ref 3–14)
BUN SERPL-MCNC: 26 MG/DL (ref 7–30)
CALCIUM SERPL-MCNC: 8.2 MG/DL (ref 8.5–10.1)
CHLORIDE SERPL-SCNC: 102 MMOL/L (ref 94–109)
CO2 SERPL-SCNC: 25 MMOL/L (ref 20–32)
CREAT SERPL-MCNC: 1.45 MG/DL (ref 0.52–1.04)
GFR SERPL CREATININE-BSD FRML MDRD: 39 ML/MIN/1.7M2
GLUCOSE SERPL-MCNC: 168 MG/DL (ref 70–99)
INR PPP: 2.86 (ref 0.86–1.14)
POTASSIUM SERPL-SCNC: 3.5 MMOL/L (ref 3.4–5.3)
SODIUM SERPL-SCNC: 137 MMOL/L (ref 133–144)

## 2018-06-13 PROCEDURE — 99214 OFFICE O/P EST MOD 30 MIN: CPT | Mod: ZP | Performed by: NURSE PRACTITIONER

## 2018-06-13 PROCEDURE — 80048 BASIC METABOLIC PNL TOTAL CA: CPT | Performed by: INTERNAL MEDICINE

## 2018-06-13 PROCEDURE — G0463 HOSPITAL OUTPT CLINIC VISIT: HCPCS | Mod: ZF

## 2018-06-13 PROCEDURE — 93010 ELECTROCARDIOGRAM REPORT: CPT | Mod: ZP | Performed by: INTERNAL MEDICINE

## 2018-06-13 PROCEDURE — 36415 COLL VENOUS BLD VENIPUNCTURE: CPT | Performed by: INTERNAL MEDICINE

## 2018-06-13 PROCEDURE — 85610 PROTHROMBIN TIME: CPT | Performed by: INTERNAL MEDICINE

## 2018-06-13 RX ORDER — METOPROLOL SUCCINATE 25 MG/1
25 TABLET, EXTENDED RELEASE ORAL AT BEDTIME
Qty: 90 TABLET | Refills: 3 | Status: SHIPPED | OUTPATIENT
Start: 2018-06-13 | End: 2018-07-25

## 2018-06-13 ASSESSMENT — CONF VISUAL FIELD
METHOD: COUNTING FINGERS
OS_NORMAL: 1
OD_NORMAL: 1

## 2018-06-13 ASSESSMENT — REFRACTION_MANIFEST
OS_ADD: +1.25
OD_AXIS: 110
OS_AXIS: 110
OS_SPHERE: -0.25
OS_CYLINDER: +0.50
OD_CYLINDER: +0.75
OD_ADD: +1.25
OD_SPHERE: -0.75

## 2018-06-13 ASSESSMENT — SLIT LAMP EXAM - LIDS
COMMENTS: NORMAL
COMMENTS: NORMAL

## 2018-06-13 ASSESSMENT — CUP TO DISC RATIO
OD_RATIO: 0.2
OS_RATIO: 0.3

## 2018-06-13 ASSESSMENT — TONOMETRY
OD_IOP_MMHG: 18
IOP_METHOD: ICARE
OS_IOP_MMHG: 18

## 2018-06-13 ASSESSMENT — PAIN SCALES - GENERAL: PAINLEVEL: NO PAIN (0)

## 2018-06-13 ASSESSMENT — VISUAL ACUITY
OS_SC: 20/25
OD_SC: 20/30
METHOD: SNELLEN - LINEAR

## 2018-06-13 ASSESSMENT — EXTERNAL EXAM - LEFT EYE: OS_EXAM: NORMAL

## 2018-06-13 ASSESSMENT — EXTERNAL EXAM - RIGHT EYE: OD_EXAM: NORMAL

## 2018-06-13 NOTE — MR AVS SNAPSHOT
Pricilla Brown   6/13/2018   Anticoagulation Therapy Visit    Description:  43 year old female   Provider:  Alecia Jacobson, RN   Department:  [facility]           INR as of 6/13/2018     Today's INR 2.86!      Anticoagulation Summary as of 6/13/2018     INR goal 2.0-2.5   Today's INR 2.86!   Full warfarin instructions 20 mg on Mon, Fri; 15 mg all other days   Next INR check 6/28/2018    Indications   Atrial fibrillation (H) [I48.91] [I48.91]  Long-term (current) use of anticoagulants [Z79.01] [Z79.01]         June 2018 Details    Sun Mon Tue Wed Thu Fri Sat          1               2                 3               4               5               6               7               8               9                 10               11               12               13      15 mg   See details      14      15 mg         15      20 mg         16      15 mg           17      15 mg         18      20 mg         19      15 mg         20      15 mg         21      15 mg         22      20 mg         23      15 mg           24      15 mg         25      20 mg         26      15 mg         27      15 mg         28            29               30                Date Details   06/13 This INR check       Date of next INR:  6/28/2018         How to take your warfarin dose     To take:  15 mg Take 3 of the 5 mg tablets.    To take:  20 mg Take 4 of the 5 mg tablets.

## 2018-06-13 NOTE — PROGRESS NOTES
ANTICOAGULATION FOLLOW-UP CLINIC VISIT    Patient Name:  Pricilla Brown  Date:  6/13/2018  Contact Type:  Telephone    SUBJECTIVE:     Patient Findings     Comments Pricilla will have an extra serving of vitamin K over the next couple of weeks to help bring down INR.            OBJECTIVE    INR   Date Value Ref Range Status   06/13/2018 2.86 (H) 0.86 - 1.14 Final     Chromogenic Factor 10   Date Value Ref Range Status   04/08/2017 19 (L) 70 - 130 % Final     Comment:     Therapeutic Range:  A Chromogenic Factor 10 level of approximately 20-40%   inversely correlates with an INR of 2-3 for patients receiving Warfarin.   Chromogenic Factor 10 levels below 20% indicate an INR greater than 3 and   levels above 40% indicate an INR less than 2.         ASSESSMENT / PLAN  No question data found.  Anticoagulation Summary as of 6/13/2018     INR goal 2.0-2.5   Today's INR 2.86!   Warfarin maintenance plan 20 mg (5 mg x 4) on Mon, Fri; 15 mg (5 mg x 3) all other days   Full warfarin instructions 20 mg on Mon, Fri; 15 mg all other days   Weekly warfarin total 115 mg   Plan last modified Velvet Asencio, RN (3/15/2017)   Next INR check 6/28/2018   Priority INR   Target end date Indefinite    Indications   Atrial fibrillation (H) [I48.91] [I48.91]  Long-term (current) use of anticoagulants [Z79.01] [Z79.01]         Anticoagulation Episode Summary     INR check location Clinic Lab    Preferred lab     Send INR reminders to [facility]    Comments Contact Patient at [phone]      Anticoagulation Care Providers     Provider Role Specialty Phone number    Percy Alcala MD  Cardiology [phone]            See the Encounter Report to view Anticoagulation Flowsheet and Dosing Calendar (Go to Encounters tab in chart review, and find the Anticoagulation Therapy Visit)    Spoke with Pricilla.     Alecia Jacobson, TA

## 2018-06-13 NOTE — LETTER
6/13/2018      RE: Priclila Brown  [address]  [facility] [address]       Dear Colleague,    Thank you for the opportunity to participate in the care of your patient, Pricilla Brown, at the [facility] HE[facility]. Please see a copy of my visit note below.    HPI: 43 yr old female patient followed by Dr. Alcala for dilated cardiomyopathy, presents for follow up to [facility]. Pt was last seen by Dr. Alcala on 5/29/18. At that time her apresoline was increased and she returns today for follow up. She states she feels well, denies any dizziness, but has noted more SOB with exertion as of late. She denies chest pain, orthopnea/PND, LE edema. Her appetite is good. Her energy is poor, but states this is due to her SOB with exertion.    PAST MEDICAL HISTORY:  Past Medical History:   Diagnosis Date     A-fib (H) 2011    on coumadin since 1/13     Asthma     as a kid     Chest pain 2/1/2017     Chronic anticoagulation for a-fib 2/15/2013    INR's followed by coumadin clinic at      Diabetes mellitus (H) 2012     Diastolic heart failure 2/15/2013     Dilated cardiomyopathy (H) 1/8/2013     HTN (hypertension)      Hyperthyroidism     Graves, s/p I131 1/13, now on prednisone and methimazole     Morbid obesity (H)      Pulmonary embolism (H) 1/12    hospitalized in [place], on lovenox/coumadin for a few months but stopped, hypercoag w/u neg per pt     Sleep apnea     using CPAP       FAMILY HISTORY:  Family History   Problem Relation Age of Onset     Thyroid Disease Mother      Grave's D     DIABETES Mother      HEART DISEASE Mother      CEREBROVASCULAR DISEASE Mother      dec. 56 yo     Hypertension Mother      Thyroid Disease Maternal Aunt      HEART DISEASE Sister      Heart Murmur     DIABETES Sister      CANCER No family hx of      Glaucoma No family hx of      Macular Degeneration No family hx of      Thyroid Disease Maternal Uncle      HEART DISEASE Father      dec in  his 30s, MI     Psychotic Disorder Brother      Bipolar     DIABETES Brother      Thyroid Disease Brother      Hyper Thyroid     HEART DISEASE Brother      Thyroid Disease Sister      Hyper Thyroid     Thyroid Disease Sister      Hyper Thyroid     Thyroid Disease Sister      Mental Health Problems       SOCIAL HISTORY:  Social History     Social History     Marital status: Single     Spouse name: N/A     Number of children: N/A     Years of education: N/A     Social History Main Topics     Smoking status: Light Tobacco Smoker     Packs/day: 0.25     Years: 13.00     Types: Cigarettes     Smokeless tobacco: Never Used      Comment: down to 5 cigs     Alcohol use No     Drug use: No     Sexual activity: Yes     Partners: Male     Birth control/ protection: Condom     Other Topics Concern     None     Social History Narrative    Single, no children        Gyn:        Last pap several years ago, no abnormal    No STIs            Patient is single.  She is no longer working.  She used to work in the area of customer service.  She is currently living with her sister in [place], [place].  She has no pets.  Patient has smoked a half pack of cigarettes a day for the past 10 plus years.  She states that she is down to 5 cigarettes a day with the aid of Wellbutrin.  She does not smoke cigars, pipes or chew tobacco.  She has 1 cup of coffee in the morning.  She does not drink alcohol.  Patient does not exercise.        CURRENT MEDICATIONS:  Current Outpatient Prescriptions   Medication Sig Dispense Refill     acetaminophen (TYLENOL) 325 MG tablet Take 2 tablets (650 mg) by mouth 3 times daily as needed for mild pain or fever (total acetaminophen dose should not exceed 3000 mg per day) 180 tablet 5     albuterol (PROAIR HFA, PROVENTIL HFA, VENTOLIN HFA) 108 (90 BASE) MCG/ACT inhaler Inhale 2 puffs into the lungs every 6 hours as needed. 1 Inhaler 11     bisacodyl (DULCOLAX) 10 MG suppository Place 1 suppository (10  mg) rectally daily as needed for constipation 6 suppository 0     blood glucose (NO BRAND SPECIFIED) lancets standard Use to test blood sugar 5 times daily or as directed. 200 each 11     blood glucose monitoring (NO BRAND SPECIFIED) meter device kit Use to test blood sugar 3 times daily or as directed. 1 kit 1     blood glucose monitoring (NO BRAND SPECIFIED) test strip Use to test blood sugars 3 times daily or as directed 100 strip 11     Blood Glucose Monitoring Suppl (IBG STAR) W/DEVICE KIT -PLEASE GIVE PATIENT A DEVICE HER INSURANCE WILL COVER- 1 kit 0     buPROPion (WELLBUTRIN SR) 100 MG 12 hr tablet Take 1 tablet (100 mg) by mouth 2 times daily 180 tablet 1     capsaicin (ZOSTRIX) 0.025 % CREA Apply 1 g topically 3 times daily as needed 1 Tube 0     ciclopirox 8 % SOLN Externally apply topically daily To toenails. 1 Bottle 11     clotrimazole (LOTRIMIN) 1 % cream Apply topically 2 times daily 30 g 1     colchicine 0.6 MG tablet Take 1-2 tablets (0.6-1.2 mg) by mouth daily as needed for moderate pain 12 tablet 1     diclofenac (VOLTAREN) 1 % GEL topical gel Apply 4 grams to knees or 2 grams to hands four times daily using enclosed dosing card. 100 g 1     dulaglutide (TRULICITY) 1.5 MG/0.5ML pen Inject 1.5 mg Subcutaneous every 7 days 45 mL 3     DULoxetine (CYMBALTA) 20 MG capsule Take 1 capsule (20 mg) by mouth 2 times daily 60 capsule 0     Efinaconazole 10 % SOLN Externally apply topically daily To toenails. 8 mL 11     gabapentin (NEURONTIN) 300 MG capsule Take 2 capsules (600 mg) by mouth 2 times daily Needs PCP appt for further refills 120 capsule 0     hydrALAZINE (APRESOLINE) 25 MG tablet Take 1 tab (25 mg) in am, 2 tabs (50 mg) midday, and 3 tabs (75 mg) in pm daily 450 tablet 3     insulin glargine U-300 (TOUJEO) 300 UNIT/ML injection Inject 170 units SQ each am. 30 mL 3     levothyroxine (SYNTHROID/LEVOTHROID) 200 MCG tablet Take 1 tab Monday thru Saturday and 2 tabs on Sundays. 102 tablet 3      metoprolol succinate (TOPROL-XL) 25 MG 24 hr tablet Take 1 tablet (25 mg) by mouth At Bedtime 90 tablet 3     morphine (MS CONTIN) 15 MG 12 hr tablet Take 15 mg by mouth daily as needed       NOVOLOG FLEXPEN 100 UNIT/ML soln Inject 65 units with meals plus correction. Pt uses approx 190 units in 24 hrs. 180 mL 3     nystatin (MYCOSTATIN) cream Apply topically 2 times daily To toenails 90 g 6     order for DME Left foot 1 Units 0     order for DME Equipment being ordered:  9948-3809 $92   Plantar, fasciitis, LG, night splint 1 each 0     order for DME Equipment being ordered: Challenger Wide walker if available - patient needs seat, basket and brakes. 1 each 0     ORDER FOR DME Use your CPAP device as directed by your provider. Pressure change to min 13 max 18cwp 1 each 99     oxyCODONE-acetaminophen (PERCOCET) 5-325 MG per tablet Take 1 tablet by mouth every 8 hours as needed for moderate to severe pain (try to limit use, no further prescriptions until seen in pain clinic) 60 tablet 0     polyethylene glycol (MIRALAX/GLYCOLAX) powder        potassium chloride SA (POTASSIUM CHLORIDE) 20 MEQ CR tablet Take 2 tablets (40 mEq) by mouth 4 times daily 240 tablet 3     Respiratory Therapy Supplies (NEBULIZER COMPRESSOR) KIT 1 Device 4 times daily as needed. 1 kit 3     senna (SENOKOT) 8.6 MG tablet Take 1 tablet by mouth 2 times daily 45 tablet 0     spironolactone (ALDACTONE) 50 MG tablet Take 1 tablet (50 mg) by mouth daily 30 tablet 11     topiramate (TOPAMAX) 25 MG tablet 25 mg at bedtime for 1 week, 50 mg at bedtime for 1 week and 75 mg daily at bedtime thereafter 360 tablet 2     torsemide (DEMADEX) 100 MG tablet Take 1 tablet (100 mg) by mouth 2 times daily 180 tablet 1     warfarin (COUMADIN) 5 MG tablet Take 20 mg (four tabs) M and F and 15 mg (three tabs) all other days of the week or as directed by the Coumadin Clinic. 100 tablet 6     nicotine ([employer] NICOTINE) 14 MG/24HR patch 2h hr Place 1 patch onto the  "skin every 24 hours 30 patch 1     tiotropium (SPIRIVA HANDIHALER) 18 MCG inhalation capsule Inhale contents of one capsule daily. 30 capsule 1     [DISCONTINUED] metoprolol (TOPROL-XL) 50 MG 24 hr tablet Take 0.5 tablets (25 mg) by mouth At Bedtime 90 tablet 3       ROS:   Constitutional: No fever, chills, or sweats. No weight gain/loss.   ENT: No visual disturbance, ear ache, epistaxis, sore throat.   Allergies/Immunologic: Negative.   Respiratory: No cough, hemoptysis.   Cardiovascular: As per HPI.   GI: No nausea, vomiting, hematemesis, melena, or hematochezia.   : No urinary frequency, dysuria, or hematuria.   Integument: Negative.   Psychiatric: Negative.   Neuro: Negative.   Endocrinology: Negative.   Musculoskeletal: Negative.    EXAM:  /51 (BP Location: Left arm, Patient Position: Chair, Cuff Size: Adult Large)  Pulse 136  Ht 1.702 m (5' 7\")  Wt (!) 206.2 kg (454 lb 8 oz)  SpO2 93%  BMI 71.18 kg/m2  General: appears comfortable, alert and articulate; obese female  Head: normocephalic, atraumatic  Eyes: anicteric sclera, EOMI  Neck: no adenopathy  Orophyarynx: moist mucosa, no lesions, dentition intact  Heart: regular, S1/S2, no murmur, gallop, rub, estimated JVP 8cm  Lungs: clear, no rales or wheezing  Abdomen: obese; soft, non-tender, bowel sounds present, no hepatosplenomegaly  Extremities: no clubbing, cyanosis or edema  Neurological: normal speech and affect, no gross motor deficits    Labs:  CBC RESULTS:  Lab Results   Component Value Date    WBC 13.6 (H) 05/31/2017    RBC 4.35 05/31/2017    HGB 15.9 (H) 05/29/2018    HCT 39.6 05/31/2017    MCV 91 05/31/2017    MCH 29.2 05/31/2017    MCHC 32.1 05/31/2017    RDW 15.4 (H) 05/31/2017     05/31/2017       CMP RESULTS:  Lab Results   Component Value Date     06/13/2018    POTASSIUM 3.5 06/13/2018    CHLORIDE 102 06/13/2018    CO2 25 06/13/2018    ANIONGAP 9 06/13/2018     (H) 06/13/2018    BUN 26 06/13/2018    CR 1.45 (H) " 06/13/2018    GFRESTIMATED 39 (L) 06/13/2018    GFRESTBLACK 47 (L) 06/13/2018    JUSTIN 8.2 (L) 06/13/2018    BILITOTAL 0.4 05/29/2018    ALBUMIN 3.6 05/29/2018    ALKPHOS 85 05/29/2018    ALT 20 05/29/2018    AST 12 05/29/2018        INR RESULTS:  Lab Results   Component Value Date    INR 2.86 (H) 06/13/2018       Lab Results   Component Value Date    MAG 1.8 01/06/2015     Lab Results   Component Value Date    NTBNPI 30 02/23/2015     Lab Results   Component Value Date    NTBNP 37 12/12/2017       Assessment and Plan:   1. Chronic systolic heart failure secondary to NICM.    Stage C  NYHA Class III  ACEi/ARB yes  BB no - she was off her Toprol XL for unclear reasons. This will be started today at 25mg daily.  Aldosterone antagonist yes  SCD prophylaxis does not meet criteria for implant  % BiV pacing: N/A  Fluid status euvolemic - although difficult to assess given body habitus.     2. AFib: with rapid rate - pt is not taking her beta blocker. Will restart Toprol XL 25mg daily.Pt is anticoagulated    3. Ckd: suspect due to diabetic nephropathy - increase in Cr today - suspect due to better BP control    4. Morbid obesity    5. Tobacco use: pt states she is cutting down, but is still using tobacco regularly.     3. Morbid obesity - unchanged    4. DM: managed by PCP    5. Hypothyroidism: on Synthroid - has had radiation therapy    Follow-up 1 week to assess heart rate response to beta blocker.     CC  Patient Care Team:  Jamilah Bernal MD as PCP - General (Internal Medicine)  Percy Alcala MD as MD (Cardiology)  Silva Damon MD as MD (Internal Medicine)  Norman Jean DPM (Podiatry)  Steph Kim PA-C as Physician Assistant (Physician Assistant)  Delmi Orellana MD as MD (Ophthalmology)  Nelly Miranda MD as MD (Ophthalmology)  Drea Rosas, RN as Clinic Care Coordinator (Bariatric)  Isela Archibald PA-C as Physician Assistant (Physician  Assistant)  Tom Guardado MD as MD (Ophthalmology)  Russel Vergara OD as MD (Optometry)  Ophelia Kenny, RN as Nurse Coordinator (Cardiology)      Please do not hesitate to contact me if you have any questions/concerns.     Sincerely,     YOCASTA Cross CNP

## 2018-06-13 NOTE — PROGRESS NOTES
Assessment/Plan  (E11.9) Type 2 diabetes mellitus without retinopathy (H)  (primary encounter diagnosis)  Comment: No diabetic retinopathy OU  Plan:  Educated patient on clinical findings and the importance of continued management with primary care physician. Continue management as directed and return to clinic in 1 year for dilated exam, or sooner, as needed.    (H35.033) Hypertensive retinopathy of both eyes  Comment: Tortuous vessels OU, Drance heme OS, question of optic nerve edema vs. Pseudopapilledema; denies headaches.  Plan:  Referred patient to Dr. Samuel for neuro-ophthalmology consultation.     (H47.10) Edema of optic nerve  Comment: See above    Plan: OCT Optic Nerve RNFL Spectralis OU (both eyes)    (H52.13) Myopia of both eyes  Comment: Myopia with presbyopia OU  Plan: REFRACTION [[id number]]   Dispensed spectacle prescription for full time wear. Educated patient on possibility of adaptation period, if symptoms do not improve return to clinic for further testing.    (H10.13) Allergic conjunctivitis of both eyes  Comment: Symptomatic with irritation, redness  Plan:  Recommended Systane Ultra as needed. Monitor annually.      Complete documentation of historical and exam elements from today's encounter can  be found in the full encounter summary report (not reduplicated in this progress  note). I personally obtained the chief complaint(s) and history of present illness. I  confirmed and edited as necessary the review of systems, past medical/surgical  history, family history, social history, and examination findings as documented by  others; and I examined the patient myself. I personally reviewed the relevant tests,  images, and reports as documented above. I formulated and edited as necessary the  assessment and plan and discussed the findings and management plan with the  patient and family.    Russel Vergara, OD, FAAO

## 2018-06-13 NOTE — NURSING NOTE
Chief Complaints and History of Present Illnesses   Patient presents with     Diabetic Eye Exam     HPI    Affected eye(s):  Both   Symptoms:     No blurred vision   No floaters   No flashes   Redness   Tearing   No Dryness   No itching   Burning      Duration:  2 months      Do you have eye pain now?:  No      Comments:    DMII: last BGL: 168 a little bit ago.   Lab Results       Component                Value               Date                       A1C                      9.6                 05/29/2018                 A1C                      9.0                 04/07/2017                 A1C                      12.1                03/10/2016                 A1C                      >16.0               09/30/2014                 A1C                      11.8                01/28/2014              Tessie Quinteros June 13, 2018 12:06 PM

## 2018-06-13 NOTE — MR AVS SNAPSHOT
After Visit Summary   6/13/2018    Pricilla Brown    MRN: [id number]           Patient Information     Date Of Birth          1975        Visit Information        Provider Department      6/13/2018 10:00 AM Kristen Cuello APRN [facility]        Today's Diagnoses     Bradycardia    -  1    Chronic diastolic heart failure (H)          Care Instructions    You were seen today in the Cardiovascular Clinic at the [facility].     Cardiology Providers you saw during your visit: Kristen Cuello NP     1.  Please start taking metoprolol/Toprol 25mg daily.  2.  Our fax number is [phone]. Attn: Kristen Cuello  3.  Please make a follow-up CORE/heart failure appt  in 1 weeks with labs prior.    Results for PRICILLA BROWN (MRN [id number]) as of 6/13/2018 11:18   Ref. Range 6/13/2018 10:16   Sodium Latest Ref Range: 133 - 144 mmol/L 137   Potassium Latest Ref Range: 3.4 - 5.3 mmol/L 3.5   Chloride Latest Ref Range: 94 - 109 mmol/L 102   Carbon Dioxide Latest Ref Range: 20 - 32 mmol/L 25   Urea Nitrogen Latest Ref Range: 7 - 30 mg/dL 26   Creatinine Latest Ref Range: 0.52 - 1.04 mg/dL 1.45 (H)   GFR Estimate Latest Ref Range: >60 mL/min/1.7m2 39 (L)   GFR Estimate If Black Latest Ref Range: >60 mL/min/1.7m2 47 (L)   Calcium Latest Ref Range: 8.5 - 10.1 mg/dL 8.2 (L)   Anion Gap Latest Ref Range: 3 - 14 mmol/L 9   Glucose Latest Ref Range: 70 - 99 mg/dL 168 (H)   INR Latest Ref Range: 0.86 - 1.14  2.86 (H)       Please limit your fluid intake to 2 L (64 ounces) daily.  2 Liters a day = 8.5 cups, or 64 ounces.  Please limit your salt intake to 2 grams a day or less.     If you gain 2# in 24 hours or 5# in one week call Ophelia Kenny RN so we can adjust your medications as needed over the phone.     Please feel free to call me with any questions or concerns.       Ophelia Kenny RN BSN CHFN  [facility]  Cardiology Care Coordinator-Heart Failure  "Clinic     Questions and [phone].   First press #1 for the University and then press #3 for \"Medical Questions\" to reach us Cardiology Nurses.      On Call Cardiologist for after hours or on weekends: [phone]   option #4 and ask to speak to the on-call Cardiologist. Inform them you are a CORE/heart failure patient at the [facility].           If you need a medication refill please contact your pharmacy.  Please allow 3 business days for your refill to be completed.  _______________________________________________________  C.O.R.E. CLINIC Cardiomyopathy, Optimization, Rehabilitation, Education   The C.O.R.E. CLINIC is a heart failure specialty clinic within the [facility] where you will work with specialized nurse practitioners dedicated to helping patients with heart failure carefully adjust medications, receive education, and learn who and when to call if symptoms develop. They specialize in helping you better understand your condition, slow the progression of your disease, improve the length and quality of your life, help you detect future heart problems before they become life threatening, and avoid hospitalizations.  As always, thank you for trusting us with your health care needs!           Follow-ups after your visit        Additional Services     Follow-Up with CORE Clinic       F/u in 1 week with Kristen boles in [facility] clinic  [facility] first                  Your next 10 appointments already scheduled     2018 12:20 PM CDT   (Arrive by 12:05 PM)   NEW GENERAL with Russel Vergara OD    Health Ophthalmology ([facility])    [address] [place] [address]  [address] [facility] [address] [phone]            2018 11:30 AM CDT   Lab with  LAB    Health Lab ([facility])    [address] [place] [address]  [address]st [facility] [id number] [phone]            2018 12:00 PM CDT   (Arrive by " 11:45 AM)   CORE NEW with YOCASTA Cross CNP   [facility] ([facility])    [address]  [address]  [facility] [address]   [phone]            Jun 28, 2018 11:30 AM CDT   (Arrive by 11:15 AM)   NUTRITION VISIT with YARY Dia [facility] Surgical Weight Management ([facility])    [address]  4th Floor  [facility] [address]   [phone]            Jun 29, 2018  9:45 AM CDT   Pool Evaluation with Chantal Chavis, PT   [facility] Physical Therapy ([facility])    [address] [place] [address]  [address]  [facility] [address]   [phone]            Jul 02, 2018 12:30 PM CDT   Return Sleep Patient with YOCASTA Horan CNP   [facility], Sleep Study ([facility])    [address] [place] [address] [phone]            Jul 27, 2018 10:45 AM CDT   (Arrive by 10:30 AM)   Return Weight Management Visit with SHANE Murcia [facility] Medical Weight Management ([facility])    [address]  [address] [facility] [address]   [phone]            Aug 03, 2018  3:00 PM CDT   (Arrive by 2:45 PM)   RETURN DIABETES with SHANE Rojas [facility] Endocrinology ([facility])    [address]  [address] [facility] [place] [address] [phone]            Oct 09, 2018  8:45 AM CDT   (Arrive by 8:30 AM)   Return Visit with Silva Damon MD   [facility] Medical Weight Management ([facility])    [address]  [address] [facility] [place] [address]   [phone]              Future tests that were ordered for you today     Open Future Orders        Priority Expected Expires Ordered    Follow-Up with CORE Clinic Routine 6/19/2018 9/18/2018 6/13/2018    EKG 12-lead, tracing only (Future) Routine  "6/13/2018 10/11/2018 6/13/2018            Who to contact     If you have questions or need follow up information about today's clinic visit or your schedule please contact [facility] directly at [phone].  Normal or non-critical lab and imaging results will be communicated to you by [employer], letter or phone within 4 business days after the clinic has received the results. If you do not hear from us within 7 days, please contact the clinic through [employer] or phone. If you have a critical or abnormal lab result, we will notify you by phone as soon as possible.  Submit refill requests through [employer] or call your pharmacy and they will forward the refill request to us. Please allow 3 business days for your refill to be completed.          Additional Information About Your Visit        [employer] Information     [employer] gives you secure access to your electronic health record. If you see a primary care provider, you can also send messages to your care team and make appointments. If you have questions, please call your primary care clinic.  If you do not have a primary care provider, please call [phone] and they will assist you.        Care EveryWhere ID     This is your Care EveryWhere ID. This could be used by other organizations to access your [facility] medical records  [id number]        Your Vitals Were     Pulse Height Pulse Oximetry BMI (Body Mass Index)          136 1.702 m (5' 7\") 93% 71.18 kg/m2         Blood Pressure from Last 3 Encounters:   06/13/18 104/51   05/29/18 132/85   05/29/18 132/85    Weight from Last 3 Encounters:   06/13/18 (!) 206.2 kg (454 lb 8 oz)   05/29/18 (!) 211.7 kg (466 lb 12.8 oz)   03/29/18 (!) 209.1 kg (460 lb 14.4 oz)                 Today's Medication Changes          These changes are accurate as of 6/13/18 11:51 AM.  If you have any questions, ask your nurse or doctor.               These medicines have changed or have updated prescriptions.        Dose/Directions "    metoprolol succinate 25 MG 24 hr tablet   Commonly known as:  TOPROL-XL   This may have changed:  medication strength   Used for:  Chronic diastolic heart failure (H)   Changed by:  Kristen Cuello APRN CNP        Dose:  25 mg   Take 1 tablet (25 mg) by mouth At Bedtime   Quantity:  90 tablet   Refills:  3            Where to get your medicines      These medications were sent to [facility] [place], [place] - [address]  [address], [facility] [address]    Hours:  TRANSPLANT PHONE NUMBER [phone] Phone:  [phone]     metoprolol succinate 25 MG 24 hr tablet                Primary Care Provider Office Phone # Fax #    Jamilah Bernal [phone]       [address] [facility] [address]        Equal Access to Services     KIERSTEN DRIVER : Hadii miki altman hadasho Soomaali, waaxda luqadaha, qaybta kaalmada adeegyada, bola hurt . So [facility] [phone].    ATENCIÓN: Si habla español, tiene a de la rosa disposición servicios gratuitos de asistencia lingüística. Llame al [phone].    We comply with applicable federal civil rights laws and [place] laws. We do not discriminate on the basis of race, color, national origin, age, disability, sex, sexual orientation, or gender identity.            Thank you!     Thank you for choosing [facility]  for your care. Our goal is always to provide you with excellent care. Hearing back from our patients is one way we can continue to improve our services. Please take a few minutes to complete the written survey that you may receive in the mail after your visit with us. Thank you!             Your Updated Medication List - Protect others around you: Learn how to safely use, store and throw away your medicines at www.disposemymeds.org.          This list is accurate as of 6/13/18 11:51 AM.  Always use your most recent med list.                   Brand Name  Dispense Instructions for use Diagnosis    acetaminophen 325 MG tablet    TYLENOL    180 tablet    Take 2 tablets (650 mg) by mouth 3 times daily as needed for mild pain or fever (total acetaminophen dose should not exceed 3000 mg per day)    Neuropathic pain of lower extremity, unspecified laterality       albuterol 108 (90 Base) MCG/ACT Inhaler    PROAIR HFA/PROVENTIL HFA/VENTOLIN HFA    1 Inhaler    Inhale 2 puffs into the lungs every 6 hours as needed.    Reactive airway disease       bisacodyl 10 MG Suppository    DULCOLAX    6 suppository    Place 1 suppository (10 mg) rectally daily as needed for constipation    Constipation       blood glucose lancets standard    no brand specified    200 each    Use to test blood sugar 5 times daily or as directed.    Diabetes mellitus, type 2 (H)       blood glucose monitoring test strip    no brand specified    100 strip    Use to test blood sugars 3 times daily or as directed    Diabetes mellitus, type 2 (H)       buPROPion 100 MG 12 hr tablet    WELLBUTRIN SR    180 tablet    Take 1 tablet (100 mg) by mouth 2 times daily    Tobacco abuse       capsaicin 0.025 % Crea cream    ZOSTRIX    1 Tube    Apply 1 g topically 3 times daily as needed    Dermatophytosis of nail       ciclopirox 8 % Soln     1 Bottle    Externally apply topically daily To toenails.    Dermatophytosis of nail       clotrimazole 1 % cream    LOTRIMIN    30 g    Apply topically 2 times daily    Tinea pedis of right foot       colchicine 0.6 MG tablet    COLCYRS    12 tablet    Take 1-2 tablets (0.6-1.2 mg) by mouth daily as needed for moderate pain    Acute gouty arthritis       diclofenac 1 % Gel topical gel    VOLTAREN    100 g    Apply 4 grams to knees or 2 grams to hands four times daily using enclosed dosing card.    Left foot pain       dulaglutide 1.5 MG/0.5ML pen    TRULICITY    45 mL    Inject 1.5 mg Subcutaneous every 7 days    Type 2 diabetes mellitus with hyperglycemia, with long-term  current use of insulin (H)       DULoxetine 20 MG EC capsule    CYMBALTA    60 capsule    Take 1 capsule (20 mg) by mouth 2 times daily    Depression       Efinaconazole 10 % Soln     8 mL    Externally apply topically daily To toenails.    Dermatophytosis of nail       gabapentin 300 MG capsule    NEURONTIN    120 capsule    Take 2 capsules (600 mg) by mouth 2 times daily Needs PCP appt for further refills    Type 2 diabetes mellitus with hyperglycemia, with long-term current use of insulin (H)       hydrALAZINE 25 MG tablet    APRESOLINE    450 tablet    Take 1 tab (25 mg) in am, 2 tabs (50 mg) midday, and 3 tabs (75 mg) in pm daily    Dilated cardiomyopathy (H)       * iBG star w/Device Kit     1 kit    -PLEASE GIVE PATIENT A DEVICE HER INSURANCE WILL COVER-    Type 2 diabetes mellitus with hyperglycemia, with long-term current use of insulin (H)       * blood glucose monitoring meter device kit    no brand specified    1 kit    Use to test blood sugar 3 times daily or as directed.    Diabetes mellitus, type 2 (H)       insulin glargine U-300 300 UNIT/ML injection    TOUJEO    30 mL    Inject 170 units SQ each am.    Type 2 diabetes mellitus with hyperglycemia, with long-term current use of insulin (H)       levothyroxine 200 MCG tablet    SYNTHROID/LEVOTHROID    102 tablet    Take 1 tab Monday thru Saturday and 2 tabs on Sundays.    Other specified hypothyroidism       metoprolol succinate 25 MG 24 hr tablet    TOPROL-XL    90 tablet    Take 1 tablet (25 mg) by mouth At Bedtime    Chronic diastolic heart failure (H)       morphine 15 MG 12 hr tablet    MS CONTIN     Take 15 mg by mouth daily as needed        Nebulizer Compressor Kit     1 kit    1 Device 4 times daily as needed.    COPD (chronic obstructive pulmonary disease) (H)       nicotine 14 MG/24HR 24 hr patch    [employer] NICOTINE    30 patch    Place 1 patch onto the skin every 24 hours    Tobacco abuse       NovoLOG FLEXPEN 100 UNIT/ML injection   Generic  drug:  insulin aspart     180 mL    Inject 65 units with meals plus correction. Pt uses approx 190 units in 24 hrs.    Type 2 diabetes mellitus with hyperglycemia, with long-term current use of insulin (H)       nystatin cream    MYCOSTATIN    90 g    Apply topically 2 times daily To toenails    Dermatophytosis of nail       order for DME     1 each    Use your CPAP device as directed by your provider. Pressure change to min 13 max 18cwp        * order for DME     1 each    Equipment being ordered: Challenger Wide walker if available - patient needs seat, basket and brakes.    Dilated cardiomyopathy (H), Chronic systolic heart failure (H)       * order for DME     1 each    Equipment being ordered: BI 7070-1057 $92  Plantar, fasciitis, LG, night splint    Dermatophytosis of nail, Plantar fasciitis of right foot, Diabetic neuropathy with neurologic complication (H), Peroneal tendinitis, right       * order for DME     1 Units    Left foot    Left foot pain, Diabetic neuropathy with neurologic complication (H)       oxyCODONE-acetaminophen 5-325 MG per tablet    PERCOCET    60 tablet    Take 1 tablet by mouth every 8 hours as needed for moderate to severe pain (try to limit use, no further prescriptions until seen in pain clinic)    Lumbago, Bilateral low back pain with sciatica, sciatica laterality unspecified       polyethylene glycol powder    MIRALAX/GLYCOLAX          potassium chloride SA 20 MEQ CR tablet    KLOR-CON    240 tablet    Take 2 tablets (40 mEq) by mouth 4 times daily    Hypokalemia       senna 8.6 MG tablet    SENOKOT    45 tablet    Take 1 tablet by mouth 2 times daily    Constipation       spironolactone 50 MG tablet    ALDACTONE    30 tablet    Take 1 tablet (50 mg) by mouth daily        tiotropium 18 MCG capsule    SPIRIVA HANDIHALER    30 capsule    Inhale contents of one capsule daily.    COPD (chronic obstructive pulmonary disease) (H)       topiramate 25 MG tablet    TOPAMAX    360  tablet    25 mg at bedtime for 1 week, 50 mg at bedtime for 1 week and 75 mg daily at bedtime thereafter    Morbid obesity (H)       torsemide 100 MG tablet    DEMADEX    180 tablet    Take 1 tablet (100 mg) by mouth 2 times daily    Chronic diastolic heart failure (H)       warfarin 5 MG tablet    COUMADIN    100 tablet    Take 20 mg (four tabs) M and F and 15 mg (three tabs) all other days of the week or as directed by the Coumadin Clinic.    Long term current use of anticoagulant therapy       * Notice:  This list has 5 medication(s) that are the same as other medications prescribed for you. Read the directions carefully, and ask your doctor or other care provider to review them with you.

## 2018-06-13 NOTE — MR AVS SNAPSHOT
After Visit Summary   6/13/2018    Pricilla Brown    MRN: [id number]           Patient Information     Date Of Birth          1975        Visit Information        Provider Department      6/13/2018 12:20 PM Russel Vergara OD [facility]         Follow-ups after your visit        Your next 10 appointments already scheduled     Jun 19, 2018 11:30 AM CDT   Lab with [facility]    Health Lab ([facility])    [address]  1st Floor  [facility] [id number] [phone]            Jun 19, 2018 12:00 PM CDT   (Arrive by 11:45 AM)   CORE RETURN with YOCASTA Cross CNP   [facility] Heart Care ([facility])    [address]  [address]  [facility] [id number] [phone]            Jun 28, 2018 11:30 AM CDT   (Arrive by 11:15 AM)   NUTRITION VISIT with Paulette Ruiz RD   [facility] Surgical Weight Management ([facility])    [address]  4th Floor  [facility] [id number] [phone]            Jun 29, 2018  9:45 AM CDT   Pool Evaluation with Chantal Chavis, PT   [facility] Physical Therapy ([facility])    [address]  [address]  [facility] [id number] [phone]            Jul 02, 2018 12:30 PM CDT   Return Sleep Patient with YOCASTA oHran CNP   [facility] [place] Sleep Study ([facility])    [address] [place] [id number] [phone]            Jul 11, 2018  9:00 AM CDT   (Arrive by 8:45 AM)   NEW NEURO with MD IV[facility] ([facility])    [address]  4th Floor  [facility] [id number] [phone]            Jul 27, 2018 10:45 AM CDT   (Arrive by 10:30 AM)   Return Weight Management Visit with Steph Kim PA-C   [facility] Medical Weight Management ([facility])    [address] [place] [address]  4th [facility] [address]   [phone]            Aug 03, 2018  3:00 PM CDT   (Arrive by 2:45 PM)   RETURN DIABETES with Isela Archibald PA-C   [facility] Endocrinology ([facility])    [address] [place] [address]  3rd [facility] [address]   [phone]            Oct 09, 2018  8:45 AM CDT   (Arrive by 8:30 AM)   Return Visit with MD IVY Suresh [facility] Medical Weight Management ([facility])    [address] [place] [address]  4th [facility] [address]   [phone]              Future tests that were ordered for you today     Open Future Orders        Priority Expected Expires Ordered    Follow-Up with [facility] Routine 6/19/2018 9/18/2018 6/13/2018    EKG 12-lead, tracing only (Future) Routine 6/13/2018 10/11/2018 6/13/2018            Who to contact     Please call your clinic at [phone] to:    Ask questions about your health    Make or cancel appointments    Discuss your medicines    Learn about your test results    Speak to your doctor            Additional Information About Your Visit        Torrential Information     Torrential gives you secure access to your electronic health record. If you see a primary care provider, you can also send messages to your care team and make appointments. If you have questions, please call your primary care clinic.  If you do not have a primary care provider, please call [phone] and they will assist you.      Torrential is an electronic gateway that provides easy, online access to your medical records. With [employer], you can request a clinic appointment, read your test results, renew a prescription or communicate with your care team.     To access your existing account, please contact your [facility] or call [phone] for assistance.        Care EveryWhere ID     This is your Care EveryWhere ID. This could be used by other  organizations to access your [facility] medical records  [id number]         Blood Pressure from Last 3 Encounters:   06/13/18 104/51   05/29/18 132/85   05/29/18 132/85    Weight from Last 3 Encounters:   06/13/18 (!) 206.2 kg (454 lb 8 oz)   05/29/18 (!) 211.7 kg (466 lb 12.8 oz)   03/29/18 (!) 209.1 kg (460 lb 14.4 oz)              We Performed the Following     OCT Optic Nerve RNFL Spectralis OU (both eyes)          Today's Medication Changes          These changes are accurate as of 6/13/18  1:12 PM.  If you have any questions, ask your nurse or doctor.               These medicines have changed or have updated prescriptions.        Dose/Directions    metoprolol succinate 25 MG 24 hr tablet   Commonly known as:  TOPROL-XL   This may have changed:  medication strength   Used for:  Chronic diastolic heart failure (H)   Changed by:  Kristen Cuello APRN CNP        Dose:  25 mg   Take 1 tablet (25 mg) by mouth At Bedtime   Quantity:  90 tablet   Refills:  3            Where to get your medicines      These medications were sent to [facility] [address] [place] [address] [address] [place], [place] [id number]    Hours:  TRANSPLANT PHONE NUMBER [phone] Phone:  [phone]     metoprolol succinate 25 MG 24 hr tablet                Primary Care Provider Office Phone # Fax #    Jamilah Bernal [phone]       [address] [place] [address]        Equal Access to Services     KIERSTEN DRIVER AH: Maryam bower Sorosmery, waaxda luqadaha, qaybta kaalmada adesulaimanyamk, bola walker. So [facility] [phone].    ATENCIÓN: Si habla español, tiene a de la rosa disposición servicios gratuitos de asistencia lingüística. Llame al [phone].    We comply with applicable federal civil rights laws and [place] laws. We do not discriminate on the basis of race, color, national origin, age, disability, sex, sexual  orientation, or gender identity.            Thank you!     Thank you for choosing [facility] OPHTHALMOLOG[facility]  for your care. Our goal is always to provide you with excellent care. Hearing back from our patients is one way we can continue to improve our services. Please take a few minutes to complete the written survey that you may receive in the mail after your visit with us. Thank you!             Your Updated Medication List - Protect others around you: Learn how to safely use, store and throw away your medicines at www.disposemymeds.org.          This list is accurate as of 6/13/18  1:12 PM.  Always use your most recent med list.                   Brand Name Dispense Instructions for use Diagnosis    acetaminophen 325 MG tablet    TYLENOL    180 tablet    Take 2 tablets (650 mg) by mouth 3 times daily as needed for mild pain or fever (total acetaminophen dose should not exceed 3000 mg per day)    Neuropathic pain of lower extremity, unspecified laterality       albuterol 108 (90 Base) MCG/ACT Inhaler    PROAIR HFA/PROVENTIL HFA/VENTOLIN HFA    1 Inhaler    Inhale 2 puffs into the lungs every 6 hours as needed.    Reactive airway disease       bisacodyl 10 MG Suppository    DULCOLAX    6 suppository    Place 1 suppository (10 mg) rectally daily as needed for constipation    Constipation       blood glucose lancets standard    no brand specified    200 each    Use to test blood sugar 5 times daily or as directed.    Diabetes mellitus, type 2 (H)       blood glucose monitoring test strip    no brand specified    100 strip    Use to test blood sugars 3 times daily or as directed    Diabetes mellitus, type 2 (H)       buPROPion 100 MG 12 hr tablet    WELLBUTRIN SR    180 tablet    Take 1 tablet (100 mg) by mouth 2 times daily    Tobacco abuse       capsaicin 0.025 % Crea cream    ZOSTRIX    1 Tube    Apply 1 g topically 3 times daily as needed    Dermatophytosis of nail       ciclopirox 8 % Soln     1 Bottle    Externally  apply topically daily To toenails.    Dermatophytosis of nail       clotrimazole 1 % cream    LOTRIMIN    30 g    Apply topically 2 times daily    Tinea pedis of right foot       colchicine 0.6 MG tablet    COLCYRS    12 tablet    Take 1-2 tablets (0.6-1.2 mg) by mouth daily as needed for moderate pain    Acute gouty arthritis       diclofenac 1 % Gel topical gel    VOLTAREN    100 g    Apply 4 grams to knees or 2 grams to hands four times daily using enclosed dosing card.    Left foot pain       dulaglutide 1.5 MG/0.5ML pen    TRULICITY    45 mL    Inject 1.5 mg Subcutaneous every 7 days    Type 2 diabetes mellitus with hyperglycemia, with long-term current use of insulin (H)       DULoxetine 20 MG EC capsule    CYMBALTA    60 capsule    Take 1 capsule (20 mg) by mouth 2 times daily    Depression       Efinaconazole 10 % Soln     8 mL    Externally apply topically daily To toenails.    Dermatophytosis of nail       gabapentin 300 MG capsule    NEURONTIN    120 capsule    Take 2 capsules (600 mg) by mouth 2 times daily Needs PCP appt for further refills    Type 2 diabetes mellitus with hyperglycemia, with long-term current use of insulin (H)       hydrALAZINE 25 MG tablet    APRESOLINE    450 tablet    Take 1 tab (25 mg) in am, 2 tabs (50 mg) midday, and 3 tabs (75 mg) in pm daily    Dilated cardiomyopathy (H)       * iBG star w/Device Kit     1 kit    -PLEASE GIVE PATIENT A DEVICE HER INSURANCE WILL COVER-    Type 2 diabetes mellitus with hyperglycemia, with long-term current use of insulin (H)       * blood glucose monitoring meter device kit    no brand specified    1 kit    Use to test blood sugar 3 times daily or as directed.    Diabetes mellitus, type 2 (H)       insulin glargine U-300 300 UNIT/ML injection    TOUJEO    30 mL    Inject 170 units SQ each am.    Type 2 diabetes mellitus with hyperglycemia, with long-term current use of insulin (H)       levothyroxine 200 MCG tablet    SYNTHROID/LEVOTHROID    102  tablet    Take 1 tab Monday thru Saturday and 2 tabs on Sundays.    Other specified hypothyroidism       metoprolol succinate 25 MG 24 hr tablet    TOPROL-XL    90 tablet    Take 1 tablet (25 mg) by mouth At Bedtime    Chronic diastolic heart failure (H)       morphine 15 MG 12 hr tablet    MS CONTIN     Take 15 mg by mouth daily as needed        Nebulizer Compressor Kit     1 kit    1 Device 4 times daily as needed.    COPD (chronic obstructive pulmonary disease) (H)       nicotine 14 MG/24HR 24 hr patch    [employer] NICOTINE    30 patch    Place 1 patch onto the skin every 24 hours    Tobacco abuse       NovoLOG FLEXPEN 100 UNIT/ML injection   Generic drug:  insulin aspart     180 mL    Inject 65 units with meals plus correction. Pt uses approx 190 units in 24 hrs.    Type 2 diabetes mellitus with hyperglycemia, with long-term current use of insulin (H)       nystatin cream    MYCOSTATIN    90 g    Apply topically 2 times daily To toenails    Dermatophytosis of nail       order for DME     1 each    Use your CPAP device as directed by your provider. Pressure change to min 13 max 18cwp        * order for DME     1 each    Equipment being ordered: Challenger Wide walker if available - patient needs seat, basket and brakes.    Dilated cardiomyopathy (H), Chronic systolic heart failure (H)       * order for DME     1 each    Equipment being ordered: [id number] $92  Plantar, fasciitis, LG, night splint    Dermatophytosis of nail, Plantar fasciitis of right foot, Diabetic neuropathy with neurologic complication (H), Peroneal tendinitis, right       * order for DME     1 Units    Left foot    Left foot pain, Diabetic neuropathy with neurologic complication (H)       oxyCODONE-acetaminophen 5-325 MG per tablet    PERCOCET    60 tablet    Take 1 tablet by mouth every 8 hours as needed for moderate to severe pain (try to limit use, no further prescriptions until seen in pain clinic)    Lumbago, Bilateral low back pain with  sciatica, sciatica laterality unspecified       polyethylene glycol powder    MIRALAX/GLYCOLAX          potassium chloride SA 20 MEQ CR tablet    KLOR-CON    240 tablet    Take 2 tablets (40 mEq) by mouth 4 times daily    Hypokalemia       senna 8.6 MG tablet    SENOKOT    45 tablet    Take 1 tablet by mouth 2 times daily    Constipation       spironolactone 50 MG tablet    ALDACTONE    30 tablet    Take 1 tablet (50 mg) by mouth daily        tiotropium 18 MCG capsule    SPIRIVA HANDIHALER    30 capsule    Inhale contents of one capsule daily.    COPD (chronic obstructive pulmonary disease) (H)       topiramate 25 MG tablet    TOPAMAX    360 tablet    25 mg at bedtime for 1 week, 50 mg at bedtime for 1 week and 75 mg daily at bedtime thereafter    Morbid obesity (H)       torsemide 100 MG tablet    DEMADEX    180 tablet    Take 1 tablet (100 mg) by mouth 2 times daily    Chronic diastolic heart failure (H)       warfarin 5 MG tablet    COUMADIN    100 tablet    Take 20 mg (four tabs) M and F and 15 mg (three tabs) all other days of the week or as directed by the Coumadin Clinic.    Long term current use of anticoagulant therapy       * Notice:  This list has 5 medication(s) that are the same as other medications prescribed for you. Read the directions carefully, and ask your doctor or other care provider to review them with you.

## 2018-06-13 NOTE — PROGRESS NOTES
HPI: 43 yr old female patient followed by Dr. Alcala for dilated cardiomyopathy, presents for follow up to [facility]. Pt was last seen by Dr. Alcala on 5/29/18. At that time her apresoline was increased and she returns today for follow up. She states she feels well, denies any dizziness, but has noted more SOB with exertion as of late. She denies chest pain, orthopnea/PND, LE edema. Her appetite is good. Her energy is poor, but states this is due to her SOB with exertion.    PAST MEDICAL HISTORY:  Past Medical History:   Diagnosis Date     A-fib (H) 2011    on coumadin since 1/13     Asthma     as a kid     Chest pain 2/1/2017     Chronic anticoagulation for a-fib 2/15/2013    INR's followed by coumadin clinic at      Diabetes mellitus (H) 2012     Diastolic heart failure 2/15/2013     Dilated cardiomyopathy (H) 1/8/2013     HTN (hypertension)      Hyperthyroidism     Graves, s/p I131 1/13, now on prednisone and methimazole     Morbid obesity (H)      Pulmonary embolism (H) 1/12    hospitalized in [place], on lovenox/coumadin for a few months but stopped, hypercoag w/u neg per pt     Sleep apnea     using CPAP       FAMILY HISTORY:  Family History   Problem Relation Age of Onset     Thyroid Disease Mother      Grave's D     DIABETES Mother      HEART DISEASE Mother      CEREBROVASCULAR DISEASE Mother      dec. 54 yo     Hypertension Mother      Thyroid Disease Maternal Aunt      HEART DISEASE Sister      Heart Murmur     DIABETES Sister      CANCER No family hx of      Glaucoma No family hx of      Macular Degeneration No family hx of      Thyroid Disease Maternal Uncle      HEART DISEASE Father      dec in his 30s, MI     Psychotic Disorder Brother      Bipolar     DIABETES Brother      Thyroid Disease Brother      Hyper Thyroid     HEART DISEASE Brother      Thyroid Disease Sister      Hyper Thyroid     Thyroid Disease Sister      Hyper Thyroid     Thyroid Disease Sister      Mental Health Problems       SOCIAL  HISTORY:  Social History     Social History     Marital status: Single     Spouse name: N/A     Number of children: N/A     Years of education: N/A     Social History Main Topics     Smoking status: Light Tobacco Smoker     Packs/day: 0.25     Years: 13.00     Types: Cigarettes     Smokeless tobacco: Never Used      Comment: down to 5 cigs     Alcohol use No     Drug use: No     Sexual activity: Yes     Partners: Male     Birth control/ protection: Condom     Other Topics Concern     None     Social History Narrative    Single, no children        Gyn:        Last pap several years ago, no abnormal    No STIs            Patient is single.  She is no longer working.  She used to work in the area of customer service.  She is currently living with her sister in [place], [place].  She has no pets.  Patient has smoked a half pack of cigarettes a day for the past 10 plus years.  She states that she is down to 5 cigarettes a day with the aid of Wellbutrin.  She does not smoke cigars, pipes or chew tobacco.  She has 1 cup of coffee in the morning.  She does not drink alcohol.  Patient does not exercise.        CURRENT MEDICATIONS:  Current Outpatient Prescriptions   Medication Sig Dispense Refill     acetaminophen (TYLENOL) 325 MG tablet Take 2 tablets (650 mg) by mouth 3 times daily as needed for mild pain or fever (total acetaminophen dose should not exceed 3000 mg per day) 180 tablet 5     albuterol (PROAIR HFA, PROVENTIL HFA, VENTOLIN HFA) 108 (90 BASE) MCG/ACT inhaler Inhale 2 puffs into the lungs every 6 hours as needed. 1 Inhaler 11     bisacodyl (DULCOLAX) 10 MG suppository Place 1 suppository (10 mg) rectally daily as needed for constipation 6 suppository 0     blood glucose (NO BRAND SPECIFIED) lancets standard Use to test blood sugar 5 times daily or as directed. 200 each 11     blood glucose monitoring (NO BRAND SPECIFIED) meter device kit Use to test blood sugar 3 times daily or as directed. 1 kit 1      blood glucose monitoring (NO BRAND SPECIFIED) test strip Use to test blood sugars 3 times daily or as directed 100 strip 11     Blood Glucose Monitoring Suppl (IBG STAR) W/DEVICE KIT -PLEASE GIVE PATIENT A DEVICE HER INSURANCE WILL COVER- 1 kit 0     buPROPion (WELLBUTRIN SR) 100 MG 12 hr tablet Take 1 tablet (100 mg) by mouth 2 times daily 180 tablet 1     capsaicin (ZOSTRIX) 0.025 % CREA Apply 1 g topically 3 times daily as needed 1 Tube 0     ciclopirox 8 % SOLN Externally apply topically daily To toenails. 1 Bottle 11     clotrimazole (LOTRIMIN) 1 % cream Apply topically 2 times daily 30 g 1     colchicine 0.6 MG tablet Take 1-2 tablets (0.6-1.2 mg) by mouth daily as needed for moderate pain 12 tablet 1     diclofenac (VOLTAREN) 1 % GEL topical gel Apply 4 grams to knees or 2 grams to hands four times daily using enclosed dosing card. 100 g 1     dulaglutide (TRULICITY) 1.5 MG/0.5ML pen Inject 1.5 mg Subcutaneous every 7 days 45 mL 3     DULoxetine (CYMBALTA) 20 MG capsule Take 1 capsule (20 mg) by mouth 2 times daily 60 capsule 0     Efinaconazole 10 % SOLN Externally apply topically daily To toenails. 8 mL 11     gabapentin (NEURONTIN) 300 MG capsule Take 2 capsules (600 mg) by mouth 2 times daily Needs PCP appt for further refills 120 capsule 0     hydrALAZINE (APRESOLINE) 25 MG tablet Take 1 tab (25 mg) in am, 2 tabs (50 mg) midday, and 3 tabs (75 mg) in pm daily 450 tablet 3     insulin glargine U-300 (TOUJEO) 300 UNIT/ML injection Inject 170 units SQ each am. 30 mL 3     levothyroxine (SYNTHROID/LEVOTHROID) 200 MCG tablet Take 1 tab Monday thru Saturday and 2 tabs on Sundays. 102 tablet 3     metoprolol succinate (TOPROL-XL) 25 MG 24 hr tablet Take 1 tablet (25 mg) by mouth At Bedtime 90 tablet 3     morphine (MS CONTIN) 15 MG 12 hr tablet Take 15 mg by mouth daily as needed       NOVOLOG FLEXPEN 100 UNIT/ML soln Inject 65 units with meals plus correction. Pt uses approx 190 units in 24 hrs. 180 mL 3      nystatin (MYCOSTATIN) cream Apply topically 2 times daily To toenails 90 g 6     order for DME Left foot 1 Units 0     order for DME Equipment being ordered: [id number] $92   Plantar, fasciitis, LG, night splint 1 each 0     order for DME Equipment being ordered: Challenger Wide walker if available - patient needs seat, basket and brakes. 1 each 0     ORDER FOR DME Use your CPAP device as directed by your provider. Pressure change to min 13 max 18cwp 1 each 99     oxyCODONE-acetaminophen (PERCOCET) 5-325 MG per tablet Take 1 tablet by mouth every 8 hours as needed for moderate to severe pain (try to limit use, no further prescriptions until seen in pain clinic) 60 tablet 0     polyethylene glycol (MIRALAX/GLYCOLAX) powder        potassium chloride SA (POTASSIUM CHLORIDE) 20 MEQ CR tablet Take 2 tablets (40 mEq) by mouth 4 times daily 240 tablet 3     Respiratory Therapy Supplies (NEBULIZER COMPRESSOR) KIT 1 Device 4 times daily as needed. 1 kit 3     senna (SENOKOT) 8.6 MG tablet Take 1 tablet by mouth 2 times daily 45 tablet 0     spironolactone (ALDACTONE) 50 MG tablet Take 1 tablet (50 mg) by mouth daily 30 tablet 11     topiramate (TOPAMAX) 25 MG tablet 25 mg at bedtime for 1 week, 50 mg at bedtime for 1 week and 75 mg daily at bedtime thereafter 360 tablet 2     torsemide (DEMADEX) 100 MG tablet Take 1 tablet (100 mg) by mouth 2 times daily 180 tablet 1     warfarin (COUMADIN) 5 MG tablet Take 20 mg (four tabs) M and F and 15 mg (three tabs) all other days of the week or as directed by the Coumadin Clinic. 100 tablet 6     nicotine ([employer] NICOTINE) 14 MG/24HR patch 2h hr Place 1 patch onto the skin every 24 hours 30 patch 1     tiotropium (SPIRIVA HANDIHALER) 18 MCG inhalation capsule Inhale contents of one capsule daily. 30 capsule 1     [DISCONTINUED] metoprolol (TOPROL-XL) 50 MG 24 hr tablet Take 0.5 tablets (25 mg) by mouth At Bedtime 90 tablet 3       ROS:   Constitutional: No fever, chills, or  "sweats. No weight gain/loss.   ENT: No visual disturbance, ear ache, epistaxis, sore throat.   Allergies/Immunologic: Negative.   Respiratory: No cough, hemoptysis.   Cardiovascular: As per HPI.   GI: No nausea, vomiting, hematemesis, melena, or hematochezia.   : No urinary frequency, dysuria, or hematuria.   Integument: Negative.   Psychiatric: Negative.   Neuro: Negative.   Endocrinology: Negative.   Musculoskeletal: Negative.    EXAM:  /51 (BP Location: Left arm, Patient Position: Chair, Cuff Size: Adult Large)  Pulse 136  Ht 1.702 m (5' 7\")  Wt (!) 206.2 kg (454 lb 8 oz)  SpO2 93%  BMI 71.18 kg/m2  General: appears comfortable, alert and articulate; obese female  Head: normocephalic, atraumatic  Eyes: anicteric sclera, EOMI  Neck: no adenopathy  Orophyarynx: moist mucosa, no lesions, dentition intact  Heart: regular, S1/S2, no murmur, gallop, rub, estimated JVP 8cm  Lungs: clear, no rales or wheezing  Abdomen: obese; soft, non-tender, bowel sounds present, no hepatosplenomegaly  Extremities: no clubbing, cyanosis or edema  Neurological: normal speech and affect, no gross motor deficits    Labs:  CBC RESULTS:  Lab Results   Component Value Date    WBC 13.6 (H) 05/31/2017    RBC 4.35 05/31/2017    HGB 15.9 (H) 05/29/2018    HCT 39.6 05/31/2017    MCV 91 05/31/2017    MCH 29.2 05/31/2017    MCHC 32.1 05/31/2017    RDW 15.4 (H) 05/31/2017     05/31/2017       CMP RESULTS:  Lab Results   Component Value Date     06/13/2018    POTASSIUM 3.5 06/13/2018    CHLORIDE 102 06/13/2018    CO2 25 06/13/2018    ANIONGAP 9 06/13/2018     (H) 06/13/2018    BUN 26 06/13/2018    CR 1.45 (H) 06/13/2018    GFRESTIMATED 39 (L) 06/13/2018    GFRESTBLACK 47 (L) 06/13/2018    JUSTIN 8.2 (L) 06/13/2018    BILITOTAL 0.4 05/29/2018    ALBUMIN 3.6 05/29/2018    ALKPHOS 85 05/29/2018    ALT 20 05/29/2018    AST 12 05/29/2018        INR RESULTS:  Lab Results   Component Value Date    INR 2.86 (H) 06/13/2018 "       Lab Results   Component Value Date    MAG 1.8 01/06/2015     Lab Results   Component Value Date    NTBNPI 30 02/23/2015     Lab Results   Component Value Date    NTBNP 37 12/12/2017       Assessment and Plan:   1. Chronic systolic heart failure secondary to NICM.    Stage C  NYHA Class III  ACEi/ARB yes  BB no - she was off her Toprol XL for unclear reasons. This will be started today at 25mg daily.  Aldosterone antagonist yes  SCD prophylaxis does not meet criteria for implant  % BiV pacing: N/A  Fluid status euvolemic - although difficult to assess given body habitus.     2. AFib: with rapid rate - pt is not taking her beta blocker. Will restart Toprol XL 25mg daily.Pt is anticoagulated    3. Ckd: suspect due to diabetic nephropathy - increase in Cr today - suspect due to better BP control    4. Morbid obesity    5. Tobacco use: pt states she is cutting down, but is still using tobacco regularly.     3. Morbid obesity - unchanged    4. DM: managed by PCP    5. Hypothyroidism: on Synthroid - has had radiation therapy    Follow-up 1 week to assess heart rate response to beta blocker.     CC  Patient Care Team:  Jamilah Bernal MD as PCP - General (Internal Medicine)  Percy Alcala MD as MD (Cardiology)  Silva Damon MD as MD (Internal Medicine)  Norman Jean DPM (Podiatry)  Zita Liao as Registered Nurse (Diabetic Education)  Steph Kim PA-C as Physician Assistant (Physician Assistant)  Delmi Orellana MD as MD (Ophthalmology)  BeyerNelly MD as MD (Ophthalmology)  Drea Rosas, RN as Clinic Care Coordinator (Bariatric)  Isela Archibald PA-C as Physician Assistant (Physician Assistant)  Tom Guardado MD as MD (Ophthalmology)  Russel Vergara OD as MD (Optometry)  Ophelia Kenny, RN as Nurse Coordinator (Cardiology)  Kristen Cuello APRN CNP as Nurse Practitioner (Cardiology)  SELF, REFERRED

## 2018-06-13 NOTE — PATIENT INSTRUCTIONS
"You were seen today in the Cardiovascular Clinic at the [facility].     Cardiology Providers you saw during your visit: Kristen Cuello NP     1.  Please start taking metoprolol/Toprol 25mg daily.  2.  Our fax number is [phone]. Attn: Kristen Cuello  3.  Please make a follow-up CORE/heart failure appt  in 1 weeks with labs prior.    Results for BRAXTON SALMERON (MRN [id number]) as of 2018 11:18   Ref. Range 2018 10:16   Sodium Latest Ref Range: 133 - 144 mmol/L 137   Potassium Latest Ref Range: 3.4 - 5.3 mmol/L 3.5   Chloride Latest Ref Range: 94 - 109 mmol/L 102   Carbon Dioxide Latest Ref Range: 20 - 32 mmol/L 25   Urea Nitrogen Latest Ref Range: 7 - 30 mg/dL 26   Creatinine Latest Ref Range: 0.52 - 1.04 mg/dL 1.45 (H)   GFR Estimate Latest Ref Range: >60 mL/min/1.7m2 39 (L)   GFR Estimate If Black Latest Ref Range: >60 mL/min/1.7m2 47 (L)   Calcium Latest Ref Range: 8.5 - 10.1 mg/dL 8.2 (L)   Anion Gap Latest Ref Range: 3 - 14 mmol/L 9   Glucose Latest Ref Range: 70 - 99 mg/dL 168 (H)   INR Latest Ref Range: 0.86 - 1.14  2.86 (H)       Please limit your fluid intake to 2 L (64 ounces) daily.  2 Liters a day = 8.5 cups, or 64 ounces.  Please limit your salt intake to 2 grams a day or less.     If you gain 2# in 24 hours or 5# in one week call Ophelia Kenyn RN so we can adjust your medications as needed over the phone.     Please feel free to call me with any questions or concerns.       Ophelia Kenny RN BSN CHFN  [facility]  Cardiology Care Coordinator-Heart Failure Clinic     Questions and [phone].   First press #1 for the [employer] and then press #3 for \"Medical Questions\" to reach us Cardiology Nurses.      On Call Cardiologist for after hours or on weekends: [phone]   option #4 and ask to speak to the on-call Cardiologist. Inform them you are a CORE/heart failure patient at the [facility].           If you need a medication refill please " contact your pharmacy.  Please allow 3 business days for your refill to be completed.  _______________________________________________________  [facility] Cardiomyopathy, Optimization, Rehabilitation, Education   The [facility]IN[facility] is a heart failure specialty clinic within the [facility] where you will work with specialized nurse practitioners dedicated to helping patients with heart failure carefully adjust medications, receive education, and learn who and when to call if symptoms develop. They specialize in helping you better understand your condition, slow the progression of your disease, improve the length and quality of your life, help you detect future heart problems before they become life threatening, and avoid hospitalizations.  As always, thank you for trusting us with your health care needs!

## 2018-06-13 NOTE — NURSING NOTE
Chief Complaint   Patient presents with     Follow Up For     Return CORE; 43yr old female with a h/o chronic systolic heart failure secondary to nonischemic cardiomyopathy presenting for follow-up with labs prior.     Vitals were taken and medications were reconciled.     Delmy Bower RMA  10:31 AM

## 2018-06-16 ENCOUNTER — PRE VISIT (OUTPATIENT)
Dept: CARDIOLOGY | Facility: CLINIC | Age: 43
End: 2018-06-16

## 2018-06-16 DIAGNOSIS — I50.22 CHRONIC SYSTOLIC HEART FAILURE (H): Primary | ICD-10-CM

## 2018-06-19 ENCOUNTER — ANTICOAGULATION THERAPY VISIT (OUTPATIENT)
Dept: ANTICOAGULATION | Facility: CLINIC | Age: 43
End: 2018-06-19

## 2018-06-19 ENCOUNTER — OFFICE VISIT (OUTPATIENT)
Dept: CARDIOLOGY | Facility: CLINIC | Age: 43
End: 2018-06-19
Attending: NURSE PRACTITIONER
Payer: MEDICARE

## 2018-06-19 VITALS
OXYGEN SATURATION: 100 % | HEART RATE: 77 BPM | SYSTOLIC BLOOD PRESSURE: 141 MMHG | WEIGHT: 293 LBS | BODY MASS INDEX: 73.8 KG/M2 | DIASTOLIC BLOOD PRESSURE: 86 MMHG

## 2018-06-19 DIAGNOSIS — I48.91 ATRIAL FIBRILLATION (H): ICD-10-CM

## 2018-06-19 DIAGNOSIS — I48.91 ATRIAL FIBRILLATION, UNSPECIFIED TYPE (H): Primary | ICD-10-CM

## 2018-06-19 DIAGNOSIS — I48.91 ATRIAL FIBRILLATION, UNSPECIFIED TYPE (H): ICD-10-CM

## 2018-06-19 DIAGNOSIS — Z79.01 LONG-TERM (CURRENT) USE OF ANTICOAGULANTS: ICD-10-CM

## 2018-06-19 DIAGNOSIS — R00.1 BRADYCARDIA: ICD-10-CM

## 2018-06-19 DIAGNOSIS — I50.22 CHRONIC SYSTOLIC HEART FAILURE (H): ICD-10-CM

## 2018-06-19 DIAGNOSIS — I50.23 ACUTE ON CHRONIC SYSTOLIC HEART FAILURE (H): ICD-10-CM

## 2018-06-19 LAB
ANION GAP SERPL CALCULATED.3IONS-SCNC: 6 MMOL/L (ref 3–14)
BUN SERPL-MCNC: 20 MG/DL (ref 7–30)
CALCIUM SERPL-MCNC: 8.7 MG/DL (ref 8.5–10.1)
CHLORIDE SERPL-SCNC: 104 MMOL/L (ref 94–109)
CO2 SERPL-SCNC: 28 MMOL/L (ref 20–32)
CREAT SERPL-MCNC: 1.11 MG/DL (ref 0.52–1.04)
GFR SERPL CREATININE-BSD FRML MDRD: 53 ML/MIN/1.7M2
GLUCOSE SERPL-MCNC: 136 MG/DL (ref 70–99)
INR PPP: 1.74 (ref 0.86–1.14)
POTASSIUM SERPL-SCNC: 4.2 MMOL/L (ref 3.4–5.3)
SODIUM SERPL-SCNC: 138 MMOL/L (ref 133–144)

## 2018-06-19 PROCEDURE — 80048 BASIC METABOLIC PNL TOTAL CA: CPT | Performed by: NURSE PRACTITIONER

## 2018-06-19 PROCEDURE — 93010 ELECTROCARDIOGRAM REPORT: CPT | Mod: ZP | Performed by: INTERNAL MEDICINE

## 2018-06-19 PROCEDURE — 85610 PROTHROMBIN TIME: CPT | Performed by: NURSE PRACTITIONER

## 2018-06-19 PROCEDURE — 36415 COLL VENOUS BLD VENIPUNCTURE: CPT | Performed by: NURSE PRACTITIONER

## 2018-06-19 PROCEDURE — G0463 HOSPITAL OUTPT CLINIC VISIT: HCPCS | Mod: ZF

## 2018-06-19 PROCEDURE — 99214 OFFICE O/P EST MOD 30 MIN: CPT | Mod: ZP | Performed by: NURSE PRACTITIONER

## 2018-06-19 RX ORDER — METOLAZONE 2.5 MG/1
2.5 TABLET ORAL 2 TIMES DAILY
Qty: 30 TABLET | Refills: 1 | Status: SHIPPED | OUTPATIENT
Start: 2018-06-19 | End: 2018-09-11

## 2018-06-19 ASSESSMENT — PAIN SCALES - GENERAL: PAINLEVEL: NO PAIN (0)

## 2018-06-19 NOTE — PROGRESS NOTES
HPI: 43 yr old female patient followed by Dr. Alcala for dilated cardiomyopathy, presents for follow up to [facility]. She was seen 1 week ago in [facility] and at that time, her atenolol was restarted due to rapid rate Afib. She returns today for follow up. She states that she feels her heart rate is slower, but her SOB and fatigue are unchanged.  She is SOB with minimal exertion. She has also gained wt and feels she is putting on water weight.   Pt was last seen by Dr. Alcala on 5/29/18. At that time her apresoline was increased.   She has had chest pressure/pain and heaviness with over exertion. She has increased LE edema. Her appetite is poor with early satiety.     PAST MEDICAL HISTORY:  Past Medical History:   Diagnosis Date     A-fib (H) 2011    on coumadin since 1/13     Asthma     as a kid     Chest pain 2/1/2017     Chronic anticoagulation for a-fib 2/15/2013    INR's followed by coumadin clinic at      Diabetes mellitus (H) 2012     Diastolic heart failure 2/15/2013     Dilated cardiomyopathy (H) 1/8/2013     HTN (hypertension)      Hyperthyroidism     Graves, s/p I131 1/13, now on prednisone and methimazole     Morbid obesity (H)      Pulmonary embolism (H) 1/12    hospitalized in [place], on lovenox/coumadin for a few months but stopped, hypercoag w/u neg per pt     Sleep apnea     using CPAP       FAMILY HISTORY:  Family History   Problem Relation Age of Onset     Thyroid Disease Mother      Grave's D     Diabetes Mother      HEART DISEASE Mother      Cerebrovascular Disease Mother      dec. 54 yo     Hypertension Mother      HEART DISEASE Sister      Heart Murmur     Diabetes Sister      HEART DISEASE Father      dec in his 30s, MI     Psychotic Disorder Brother      Bipolar     Diabetes Brother      Thyroid Disease Brother      Hyper Thyroid     HEART DISEASE Brother      Thyroid Disease Sister      Hyper Thyroid     Thyroid Disease Sister      Hyper Thyroid     Thyroid Disease Sister      Mental Health  Problems     Thyroid Disease Maternal Aunt      Thyroid Disease Maternal Uncle      Cancer No family hx of      Glaucoma No family hx of      Macular Degeneration No family hx of        SOCIAL HISTORY:  Social History     Social History     Marital status: Single     Spouse name: N/A     Number of children: N/A     Years of education: N/A     Social History Main Topics     Smoking status: Light Tobacco Smoker     Packs/day: 0.25     Years: 13.00     Types: Cigarettes     Smokeless tobacco: Never Used      Comment: down to 5 cigs     Alcohol use No     Drug use: No     Sexual activity: Yes     Partners: Male     Birth control/ protection: Condom     Other Topics Concern     None     Social History Narrative    Single, no children        Gyn:        Last pap several years ago, no abnormal    No STIs            Patient is single.  She is no longer working.  She used to work in the area of customer service.  She is currently living with her sister in [place], [place].  She has no pets.  Patient has smoked a half pack of cigarettes a day for the past 10 plus years.  She states that she is down to 5 cigarettes a day with the aid of Wellbutrin.  She does not smoke cigars, pipes or chew tobacco.  She has 1 cup of coffee in the morning.  She does not drink alcohol.  Patient does not exercise.        CURRENT MEDICATIONS:  Current Outpatient Prescriptions   Medication Sig Dispense Refill     acetaminophen (TYLENOL) 325 MG tablet Take 2 tablets (650 mg) by mouth 3 times daily as needed for mild pain or fever (total acetaminophen dose should not exceed 3000 mg per day) 180 tablet 5     albuterol (PROAIR HFA, PROVENTIL HFA, VENTOLIN HFA) 108 (90 BASE) MCG/ACT inhaler Inhale 2 puffs into the lungs every 6 hours as needed. 1 Inhaler 11     bisacodyl (DULCOLAX) 10 MG suppository Place 1 suppository (10 mg) rectally daily as needed for constipation 6 suppository 0     blood glucose (NO BRAND SPECIFIED) lancets standard  Use to test blood sugar 5 times daily or as directed. 200 each 11     blood glucose monitoring (NO BRAND SPECIFIED) meter device kit Use to test blood sugar 3 times daily or as directed. 1 kit 1     blood glucose monitoring (NO BRAND SPECIFIED) test strip Use to test blood sugars 3 times daily or as directed 100 strip 11     Blood Glucose Monitoring Suppl (IBG STAR) W/DEVICE KIT -PLEASE GIVE PATIENT A DEVICE HER INSURANCE WILL COVER- 1 kit 0     buPROPion (WELLBUTRIN SR) 100 MG 12 hr tablet Take 1 tablet (100 mg) by mouth 2 times daily 180 tablet 1     capsaicin (ZOSTRIX) 0.025 % CREA Apply 1 g topically 3 times daily as needed 1 Tube 0     ciclopirox 8 % SOLN Externally apply topically daily To toenails. 1 Bottle 11     clotrimazole (LOTRIMIN) 1 % cream Apply topically 2 times daily 30 g 1     colchicine 0.6 MG tablet Take 1-2 tablets (0.6-1.2 mg) by mouth daily as needed for moderate pain 12 tablet 1     diclofenac (VOLTAREN) 1 % GEL topical gel Apply 4 grams to knees or 2 grams to hands four times daily using enclosed dosing card. 100 g 1     dulaglutide (TRULICITY) 1.5 MG/0.5ML pen Inject 1.5 mg Subcutaneous every 7 days 45 mL 3     DULoxetine (CYMBALTA) 20 MG capsule Take 1 capsule (20 mg) by mouth 2 times daily 60 capsule 0     Efinaconazole 10 % SOLN Externally apply topically daily To toenails. 8 mL 11     gabapentin (NEURONTIN) 300 MG capsule Take 2 capsules (600 mg) by mouth 2 times daily Needs PCP appt for further refills 120 capsule 0     hydrALAZINE (APRESOLINE) 25 MG tablet Take 1 tab (25 mg) in am, 2 tabs (50 mg) midday, and 3 tabs (75 mg) in pm daily 450 tablet 3     insulin glargine U-300 (TOUJEO) 300 UNIT/ML injection Inject 170 units SQ each am. 30 mL 3     levothyroxine (SYNTHROID/LEVOTHROID) 200 MCG tablet Take 1 tab Monday thru Saturday and 2 tabs on Sundays. 102 tablet 3     metoprolol succinate (TOPROL-XL) 25 MG 24 hr tablet Take 1 tablet (25 mg) by mouth At Bedtime 90 tablet 3     morphine  (MS CONTIN) 15 MG 12 hr tablet Take 15 mg by mouth daily as needed       nicotine ([employer] NICOTINE) 14 MG/24HR patch 2h hr Place 1 patch onto the skin every 24 hours 30 patch 1     NOVOLOG FLEXPEN 100 UNIT/ML soln Inject 65 units with meals plus correction. Pt uses approx 190 units in 24 hrs. 180 mL 3     nystatin (MYCOSTATIN) cream Apply topically 2 times daily To toenails 90 g 6     order for DME Left foot 1 Units 0     order for DME Equipment being ordered:  3098-3345 $92   Plantar, fasciitis, LG, night splint 1 each 0     order for DME Equipment being ordered: Challenger Wide walker if available - patient needs seat, basket and brakes. 1 each 0     ORDER FOR DME Use your CPAP device as directed by your provider. Pressure change to min 13 max [id number] 1 each 99     oxyCODONE-acetaminophen (PERCOCET) 5-325 MG per tablet Take 1 tablet by mouth every 8 hours as needed for moderate to severe pain (try to limit use, no further prescriptions until seen in pain clinic) 60 tablet 0     polyethylene glycol (MIRALAX/GLYCOLAX) powder        potassium chloride SA (POTASSIUM CHLORIDE) 20 MEQ CR tablet Take 2 tablets (40 mEq) by mouth 4 times daily 240 tablet 3     Respiratory Therapy Supplies (NEBULIZER COMPRESSOR) KIT 1 Device 4 times daily as needed. 1 kit 3     senna (SENOKOT) 8.6 MG tablet Take 1 tablet by mouth 2 times daily 45 tablet 0     spironolactone (ALDACTONE) 50 MG tablet Take 1 tablet (50 mg) by mouth daily 30 tablet 11     tiotropium (SPIRIVA HANDIHALER) 18 MCG inhalation capsule Inhale contents of one capsule daily. 30 capsule 1     topiramate (TOPAMAX) 25 MG tablet 25 mg at bedtime for 1 week, 50 mg at bedtime for 1 week and 75 mg daily at bedtime thereafter 360 tablet 2     torsemide (DEMADEX) 100 MG tablet Take 1 tablet (100 mg) by mouth 2 times daily 180 tablet 1     warfarin (COUMADIN) 5 MG tablet Take 20 mg (four tabs) M and F and 15 mg (three tabs) all other days of the week or as directed by the  Coumadin Clinic. 100 tablet 6       ROS:   Constitutional: No fever, chills, or sweats. No weight gain/loss.   ENT: No visual disturbance, ear ache, epistaxis, sore throat.   Allergies/Immunologic: Negative.   Respiratory: No cough, hemoptysis.   Cardiovascular: As per HPI.   GI: No nausea, vomiting, hematemesis, melena, or hematochezia.   : No urinary frequency, dysuria, or hematuria.   Integument: Negative.   Psychiatric: Negative.   Neuro: Negative.   Endocrinology: Negative.   Musculoskeletal: Negative.    EXAM:  /86 (BP Location: Left arm, Patient Position: Chair, Cuff Size: Adult Regular)  Pulse 77  Wt (!) 213.7 kg (471 lb 3.2 oz)  SpO2 100%  BMI 73.8 kg/m2  General: Obese female;  appears comfortable, alert and articulate; She gets SOB with getting on exam table  Eyes: anicteric sclera, EOMI  Neck: no adenopathy  Orophyarynx: moist mucosa, no lesions, dentition intact  Heart: regular, S1/S2, no murmur, gallop, rub, estimated JVP 8cm  Lungs: clear, no rales or wheezing  Abdomen: obese; soft, non-tender, bowel sounds present, no hepatosplenomegaly  Extremities: no clubbing, cyanosis or edema  Neurological: normal speech and affect, no gross motor deficits    Labs:  CBC RESULTS:  Lab Results   Component Value Date    WBC 13.6 (H) 05/31/2017    RBC 4.35 05/31/2017    HGB 15.9 (H) 05/29/2018    HCT 39.6 05/31/2017    MCV 91 05/31/2017    MCH 29.2 05/31/2017    MCHC 32.1 05/31/2017    RDW 15.4 (H) 05/31/2017     05/31/2017       CMP RESULTS:  Lab Results   Component Value Date     06/13/2018    POTASSIUM 3.5 06/13/2018    CHLORIDE 102 06/13/2018    CO2 25 06/13/2018    ANIONGAP 9 06/13/2018     (H) 06/13/2018    BUN 26 06/13/2018    CR 1.45 (H) 06/13/2018    GFRESTIMATED 39 (L) 06/13/2018    GFRESTBLACK 47 (L) 06/13/2018    JUSTIN 8.2 (L) 06/13/2018    BILITOTAL 0.4 05/29/2018    ALBUMIN 3.6 05/29/2018    ALKPHOS 85 05/29/2018    ALT 20 05/29/2018    AST 12 05/29/2018        INR  RESULTS:  Lab Results   Component Value Date    INR 1.74 (H) 06/19/2018       Lab Results   Component Value Date    MAG 1.8 01/06/2015     Lab Results   Component Value Date    NTBNPI 30 02/23/2015     Lab Results   Component Value Date    NTBNP 37 12/12/2017       Assessment and Plan:   1. Chronic systolic heart failure secondary to NICM.    Stage C  NYHA Class III  ACEi/ARB yes  BB no - she was off her Toprol XL for unclear reasons. This will be started today at 25mg daily.  Aldosterone antagonist yes  SCD prophylaxis does not meet criteria for implant  % BiV pacing: N/A  Fluid status  hypervoliemic - Metolazone 2.5mg BID added to regime    2. AFib: - now in sinus rhythm - rate controlled - continue current dose of atenolol    3. Ckd: suspect due to diabetic nephropathy -    4. Morbid obesity    5. Tobacco use: pt states she is cutting down, but is still using tobacco regularly.     6. HTN; suboptimal control - suspect due to volume overload    7. Morbid obesity - unchanged    8. DM: managed by PCP    9. Hypothyroidism: on Synthroid - has had radiation therapy    Follow-up 1 week to assess fluid status/labs    CC  Patient Care Team:  Jamilah Bernal MD as PCP - General (Internal Medicine)  Percy Alcala MD as MD (Cardiology)  Silva Damon MD as MD (Internal Medicine)  Norman Jean DPM (Podiatry)  Zita Liao as Registered Nurse (Diabetic Education)  Steph Kim PA-C as Physician Assistant (Physician Assistant)  Delmi Orellana MD as MD (Ophthalmology)  Nelly Miranda MD as MD (Ophthalmology)  Drea Rosas RN as Clinic Care Coordinator (Bariatric)  Isela Archibald PA-C as Physician Assistant (Physician Assistant)  Tom Guardado MD as MD (Ophthalmology)  Russel Vergara OD as MD (Optometry)  Ophelia Kenny, RN as Nurse Coordinator (Cardiology)  Kristen Cuello APRN CNP as Nurse Practitioner (Cardiology)  SELF, REFERRED

## 2018-06-19 NOTE — MR AVS SNAPSHOT
After Visit Summary   6/19/2018    Pricilla Brown    MRN: [id number]           Patient Information     Date Of Birth          1975        Visit Information        Provider Department      6/19/2018 12:00 PM Perla Cuello APRN CNP [facility]        Today's Diagnoses     Atrial fibrillation, unspecified type (H)    -  1    Bradycardia        Acute on chronic systolic heart failure (H)          Care Instructions    New Medication:  Metolazone 2.5mg - take one tablet 1/2 hour prior to torsemide - twice daily           Follow-ups after your visit        Additional Services     Follow-Up with [facility]       F/u in 1 week with [facility] NP (perla is out)                  Your next 10 appointments already scheduled     Jun 26, 2018  4:30 PM CDT   Lab with [facility]    Health Lab ([facility])    [address] [place] [address]  1st Floor  [facility] [address] [phone]            Jun 28, 2018 11:30 AM CDT   (Arrive by 11:15 AM)   NUTRITION VISIT with Paulette Ruiz RD   [facility] Management ([facility])    [address] [place] [address]  4th Floor  [facility] [id number] [phone]            Jun 29, 2018  9:45 AM CDT   Pool Evaluation with Chantal Chavis, PT   [facility] Physical Therapy ([facility])    [address]  [address]  [facility] [address]   [phone]            Jul 02, 2018 12:30 PM CDT   Return Sleep Patient with YOCASTA Horan CNP   [facility], Sleep Study ([facility])    [address] [place] [id number] [phone]            Jul 03, 2018  5:00 PM CDT   (Arrive by 4:45 PM)   CORE RETURN with Ximena Carpenter NP   [facility] Heart Care ([facility])    [address] [place] [address]  [address] [place] [id number] [phone]            Jul 11, 2018  9:00 AM CDT   (Arrive by  8:45 AM)   NEW NEURO with Vladislav Samuel MD   [facility] Ophthalmology ([facility] [place])    [address] [facility]  4th [facility] [address]   [phone]            Jul 27, 2018 10:45 AM CDT   (Arrive by 10:30 AM)   Return Weight Management Visit with Steph Kim PA-C   [facility] ([facility])    [address] [place] [address]   [phone]            Aug 03, 2018  3:00 PM CDT   (Arrive by 2:45 PM)   RETURN DIABETES with Isela Archibald PA-C   [facility] Endocrinology ([facility])    [address] [place] [address]   [phone]            Oct 09, 2018  8:45 AM CDT   (Arrive by 8:30 AM)   Return Visit with Silva Damon MD   [facility] Weight Management ([facility])    [address] [place] [address]   [phone]              Future tests that were ordered for you today     Open Future Orders        Priority Expected Expires Ordered    Follow-Up with [facility] Routine 6/26/2018 9/24/2018 6/19/2018            Who to contact     If you have questions or need follow up information about today's clinic visit or your schedule please contact [facility] directly at [phone].  Normal or non-critical lab and imaging results will be communicated to you by MyChart, letter or phone within 4 business days after the clinic has received the results. If you do not hear from us within 7 days, please contact the clinic through MyChart or phone. If you have a critical or abnormal lab result, we will notify you by phone as soon as possible.  Submit refill requests through [employer] or call your pharmacy and they will forward the refill request to us. Please allow 3 business days for your refill to be completed.          Additional Information About Your Visit        [employer]  Information     [employer] gives you secure access to your electronic health record. If you see a primary care provider, you can also send messages to your care team and make appointments. If you have questions, please call your primary care clinic.  If you do not have a primary care provider, please call [phone] and they will assist you.        Care EveryWhere ID     This is your Care EveryWhere ID. This could be used by other organizations to access your [facility] medical records  [id number]        Your Vitals Were     Pulse Pulse Oximetry BMI (Body Mass Index)             77 100% 73.8 kg/m2          Blood Pressure from Last 3 Encounters:   06/19/18 141/86   06/13/18 104/51   05/29/18 132/85    Weight from Last 3 Encounters:   06/19/18 (!) 213.7 kg (471 lb 3.2 oz)   06/13/18 (!) 206.2 kg (454 lb 8 oz)   05/29/18 (!) 211.7 kg (466 lb 12.8 oz)              We Performed the Following     EKG 12-lead, tracing only (Same Day)     Follow-Up with [facility]          Today's Medication Changes          These changes are accurate as of 6/19/18 12:58 PM.  If you have any questions, ask your nurse or doctor.               Start taking these medicines.        Dose/Directions    metolazone 2.5 MG tablet   Commonly known as:  ZAROXOLYN   Used for:  Acute on chronic systolic heart failure (H)   Started by:  Kristen Cuello APRN CNP        Dose:  2.5 mg   Take 1 tablet (2.5 mg) by mouth 2 times daily Take 1/2 hour prior to torsemide   Quantity:  30 tablet   Refills:  1            Where to get your medicines      These medications were sent to [facility] [place], [place] - [address] [facility] [address]  [address] [facility] [address] [address]    Hours:  TRANSPLANT PHONE NUMBER [phone] Phone:  [phone]     metolazone 2.5 MG tablet                Primary Care Provider Office Phone # Fax #    Jamilah Bernal [phone]       [address]  [address]        Equal Access to Services     [facility]: Hadii miki altman cheli Woodward, wagermaniada luqadaha, qaybta kagregorymk shah, waxjohn nicole bedollaashleemaggi walker. So [facility] [phone].    ATENCIÓN: Si habla español, tiene a de la rosa disposición servicios gratuitos de asistencia lingüística. Llame al [phone].    We comply with applicable federal civil rights laws and [place] laws. We do not discriminate on the basis of race, color, national origin, age, disability, sex, sexual orientation, or gender identity.            Thank you!     Thank you for choosing [facility]  for your care. Our goal is always to provide you with excellent care. Hearing back from our patients is one way we can continue to improve our services. Please take a few minutes to complete the written survey that you may receive in the mail after your visit with us. Thank you!             Your Updated Medication List - Protect others around you: Learn how to safely use, store and throw away your medicines at www.disposemymeds.org.          This list is accurate as of 6/19/18 12:58 PM.  Always use your most recent med list.                   Brand Name Dispense Instructions for use Diagnosis    acetaminophen 325 MG tablet    TYLENOL    180 tablet    Take 2 tablets (650 mg) by mouth 3 times daily as needed for mild pain or fever (total acetaminophen dose should not exceed 3000 mg per day)    Neuropathic pain of lower extremity, unspecified laterality       albuterol 108 (90 Base) MCG/ACT Inhaler    PROAIR HFA/PROVENTIL HFA/VENTOLIN HFA    1 Inhaler    Inhale 2 puffs into the lungs every 6 hours as needed.    Reactive airway disease       bisacodyl 10 MG Suppository    DULCOLAX    6 suppository    Place 1 suppository (10 mg) rectally daily as needed for constipation    Constipation       blood glucose lancets standard    no brand specified    200 each    Use to test blood sugar 5 times daily or as directed.    Diabetes mellitus,  type 2 (H)       blood glucose monitoring test strip    no brand specified    100 strip    Use to test blood sugars 3 times daily or as directed    Diabetes mellitus, type 2 (H)       buPROPion 100 MG 12 hr tablet    WELLBUTRIN SR    180 tablet    Take 1 tablet (100 mg) by mouth 2 times daily    Tobacco abuse       capsaicin 0.025 % Crea cream    ZOSTRIX    1 Tube    Apply 1 g topically 3 times daily as needed    Dermatophytosis of nail       ciclopirox 8 % Soln     1 Bottle    Externally apply topically daily To toenails.    Dermatophytosis of nail       clotrimazole 1 % cream    LOTRIMIN    30 g    Apply topically 2 times daily    Tinea pedis of right foot       colchicine 0.6 MG tablet    COLCYRS    12 tablet    Take 1-2 tablets (0.6-1.2 mg) by mouth daily as needed for moderate pain    Acute gouty arthritis       diclofenac 1 % Gel topical gel    VOLTAREN    100 g    Apply 4 grams to knees or 2 grams to hands four times daily using enclosed dosing card.    Left foot pain       dulaglutide 1.5 MG/0.5ML pen    TRULICITY    45 mL    Inject 1.5 mg Subcutaneous every 7 days    Type 2 diabetes mellitus with hyperglycemia, with long-term current use of insulin (H)       DULoxetine 20 MG EC capsule    CYMBALTA    60 capsule    Take 1 capsule (20 mg) by mouth 2 times daily    Depression       Efinaconazole 10 % Soln     8 mL    Externally apply topically daily To toenails.    Dermatophytosis of nail       gabapentin 300 MG capsule    NEURONTIN    120 capsule    Take 2 capsules (600 mg) by mouth 2 times daily Needs PCP appt for further refills    Type 2 diabetes mellitus with hyperglycemia, with long-term current use of insulin (H)       hydrALAZINE 25 MG tablet    APRESOLINE    450 tablet    Take 1 tab (25 mg) in am, 2 tabs (50 mg) midday, and 3 tabs (75 mg) in pm daily    Dilated cardiomyopathy (H)       * iBG star w/Device Kit     1 kit    -PLEASE GIVE PATIENT A DEVICE HER INSURANCE WILL COVER-    Type 2 diabetes  mellitus with hyperglycemia, with long-term current use of insulin (H)       * blood glucose monitoring meter device kit    no brand specified    1 kit    Use to test blood sugar 3 times daily or as directed.    Diabetes mellitus, type 2 (H)       insulin glargine U-300 300 UNIT/ML injection    TOUJEO    30 mL    Inject 170 units SQ each am.    Type 2 diabetes mellitus with hyperglycemia, with long-term current use of insulin (H)       levothyroxine 200 MCG tablet    SYNTHROID/LEVOTHROID    102 tablet    Take 1 tab Monday thru Saturday and 2 tabs on Sundays.    Other specified hypothyroidism       metolazone 2.5 MG tablet    ZAROXOLYN    30 tablet    Take 1 tablet (2.5 mg) by mouth 2 times daily Take 1/2 hour prior to torsemide    Acute on chronic systolic heart failure (H)       metoprolol succinate 25 MG 24 hr tablet    TOPROL-XL    90 tablet    Take 1 tablet (25 mg) by mouth At Bedtime    Chronic diastolic heart failure (H)       morphine 15 MG 12 hr tablet    MS CONTIN     Take 15 mg by mouth daily as needed        Nebulizer Compressor Kit     1 kit    1 Device 4 times daily as needed.    COPD (chronic obstructive pulmonary disease) (H)       nicotine 14 MG/24HR 24 hr patch    [employer] NICOTINE    30 patch    Place 1 patch onto the skin every 24 hours    Tobacco abuse       NovoLOG FLEXPEN 100 UNIT/ML injection   Generic drug:  insulin aspart     180 mL    Inject 65 units with meals plus correction. Pt uses approx 190 units in 24 hrs.    Type 2 diabetes mellitus with hyperglycemia, with long-term current use of insulin (H)       nystatin cream    MYCOSTATIN    90 g    Apply topically 2 times daily To toenails    Dermatophytosis of nail       order for DME     1 each    Use your CPAP device as directed by your provider. Pressure change to min 13 max 18cwp        * order for DME     1 each    Equipment being ordered: Challenger Herminia walker if available - patient needs seat, basket and brakes.    Dilated cardiomyopathy  (H), Chronic systolic heart failure (H)       * order for DME     1 each    Equipment being ordered:  1330-0716 $92  Plantar, fasciitis, LG, night splint    Dermatophytosis of nail, Plantar fasciitis of right foot, Diabetic neuropathy with neurologic complication (H), Peroneal tendinitis, right       * order for DME     1 Units    Left foot    Left foot pain, Diabetic neuropathy with neurologic complication (H)       oxyCODONE-acetaminophen 5-325 MG per tablet    PERCOCET    60 tablet    Take 1 tablet by mouth every 8 hours as needed for moderate to severe pain (try to limit use, no further prescriptions until seen in pain clinic)    Lumbago, Bilateral low back pain with sciatica, sciatica laterality unspecified       polyethylene glycol powder    MIRALAX/GLYCOLAX          potassium chloride SA 20 MEQ CR tablet    KLOR-CON    240 tablet    Take 2 tablets (40 mEq) by mouth 4 times daily    Hypokalemia       senna 8.6 MG tablet    SENOKOT    45 tablet    Take 1 tablet by mouth 2 times daily    Constipation       spironolactone 50 MG tablet    ALDACTONE    30 tablet    Take 1 tablet (50 mg) by mouth daily        tiotropium 18 MCG capsule    SPIRIVA HANDIHALER    30 capsule    Inhale contents of one capsule daily.    COPD (chronic obstructive pulmonary disease) (H)       topiramate 25 MG tablet    TOPAMAX    360 tablet    25 mg at bedtime for 1 week, 50 mg at bedtime for 1 week and 75 mg daily at bedtime thereafter    Morbid obesity (H)       torsemide 100 MG tablet    DEMADEX    180 tablet    Take 1 tablet (100 mg) by mouth 2 times daily    Chronic diastolic heart failure (H)       warfarin 5 MG tablet    COUMADIN    100 tablet    Take 20 mg (four tabs) M and F and 15 mg (three tabs) all other days of the week or as directed by the Coumadin Clinic.    Long term current use of anticoagulant therapy       * Notice:  This list has 5 medication(s) that are the same as other medications prescribed for you. Read the  directions carefully, and ask your doctor or other care provider to review them with you.

## 2018-06-19 NOTE — PROGRESS NOTES
ANTICOAGULATION FOLLOW-UP CLINIC VISIT    Patient Name:  Pricilla Brown  Date:  6/19/2018  Contact Type:  Telephone    SUBJECTIVE:        OBJECTIVE    INR   Date Value Ref Range Status   06/19/2018 1.74 (H) 0.86 - 1.14 Final     Chromogenic Factor 10   Date Value Ref Range Status   04/08/2017 19 (L) 70 - 130 % Final     Comment:     Therapeutic Range:  A Chromogenic Factor 10 level of approximately 20-40%   inversely correlates with an INR of 2-3 for patients receiving Warfarin.   Chromogenic Factor 10 levels below 20% indicate an INR greater than 3 and   levels above 40% indicate an INR less than 2.         ASSESSMENT / PLAN  INR assessment SUB    Recheck INR In: 9 DAYS    INR Location Clinic      Anticoagulation Summary as of 6/19/2018     INR goal 2.0-2.5   Today's INR 1.74!   Warfarin maintenance plan 20 mg (5 mg x 4) on Mon, Fri; 15 mg (5 mg x 3) all other days   Full warfarin instructions 6/19: 20 mg; Otherwise 20 mg on Mon, Fri; 15 mg all other days   Weekly warfarin total 115 mg   Plan last modified Velvet Asencio, RN (3/15/2017)   Next INR check 6/28/2018   Priority INR   Target end date Indefinite    Indications   Atrial fibrillation (H) [I48.91] [I48.91]  Long-term (current) use of anticoagulants [Z79.01] [Z79.01]         Anticoagulation Episode Summary     INR check location Clinic Lab    Preferred lab     Send INR reminders to [facility]    Comments Contact Patient at [phone]      Anticoagulation Care Providers     Provider Role Specialty Phone number    Percy Alcala MD  Cardiology [phone]            See the Encounter Report to view Anticoagulation Flowsheet and Dosing Calendar (Go to Encounters tab in chart review, and find the Anticoagulation Therapy Visit)    Left message for patient with results and dosing recommendations. Asked patient to call back to report any missed doses, falls, signs and symptoms of bleeding or clotting, any changes in health, medication, or  diet. Asked patient to call back with any questions or concerns.     Demi Kebede RN

## 2018-06-19 NOTE — LETTER
6/19/2018    RE: Pricilla Brown  [address]  [facility] [address]     Dear Colleague,    Thank you for the opportunity to participate in the care of your patient, Pricilla Brown, at the [facility] at [facility]. Please see a copy of my visit note below.    HPI: 43 yr old female patient followed by Dr. Alcala for dilated cardiomyopathy, [place] [facility]. [place] was seen 1 week ago [place] [facility] and at that time, her atenolol was restarted due to rapid rate Afib. She returns today for follow up. She states that she feels her heart rate is slower, but her SOB and fatigue are unchanged.  She is SOB with minimal exertion. She has also gained wt and feels she is putting on water weight.   Pt was last seen by Dr. Alcala on 5/29/18. At that time her apresoline was increased.   She has had chest pressure/pain and heaviness with over exertion. She has increased LE edema. Her appetite is poor with early satiety.     PAST MEDICAL HISTORY:  Past Medical History:   Diagnosis Date     A-fib (H) 2011    on coumadin since 1/13     Asthma     as a kid     Chest pain 2/1/2017     Chronic anticoagulation for a-fib 2/15/2013    INR's followed by coumadin clinic at      Diabetes mellitus (H) 2012     Diastolic heart failure 2/15/2013     Dilated cardiomyopathy (H) 1/8/2013     HTN (hypertension)      Hyperthyroidism     Graves, s/p I131 1/13, now on prednisone and methimazole     Morbid obesity (H)      Pulmonary embolism (H) 1/12    hospitalized in [place], on lovenox/coumadin for a few months but stopped, hypercoag w/u neg per pt     Sleep apnea     using CPAP     FAMILY HISTORY:  Family History   Problem Relation Age of Onset     Thyroid Disease Mother      Grave's D     Diabetes Mother      HEART DISEASE Mother      Cerebrovascular Disease Mother      dec. 54 yo     Hypertension Mother      HEART DISEASE Sister      Heart Murmur     Diabetes Sister      HEART DISEASE  Father      dec in his 30s, MI     Psychotic Disorder Brother      Bipolar     Diabetes Brother      Thyroid Disease Brother      Hyper Thyroid     HEART DISEASE Brother      Thyroid Disease Sister      Hyper Thyroid     Thyroid Disease Sister      Hyper Thyroid     Thyroid Disease Sister      Mental Health Problems     Thyroid Disease Maternal Aunt      Thyroid Disease Maternal Uncle      Cancer No family hx of      Glaucoma No family hx of      Macular Degeneration No family hx of      SOCIAL HISTORY:  Social History     Social History     Marital status: Single     Spouse name: N/A     Number of children: N/A     Years of education: N/A     Social History Main Topics     Smoking status: Light Tobacco Smoker     Packs/day: 0.25     Years: 13.00     Types: Cigarettes     Smokeless tobacco: Never Used      Comment: down to 5 cigs     Alcohol use No     Drug use: No     Sexual activity: Yes     Partners: Male     Birth control/ protection: Condom     Other Topics Concern     None     Social History Narrative    Single, no children        Gyn:        Last pap several years ago, no abnormal    No STIs        Patient is single.  She is no longer working.  She used to work in the area of customer service.  She is currently living with her sister in [place], [place].  She has no pets.  Patient has smoked a half pack of cigarettes a day for the past 10 plus years.  She states that she is down to 5 cigarettes a day with the aid of Wellbutrin.  She does not smoke cigars, pipes or chew tobacco.  She has 1 cup of coffee in the morning.  She does not drink alcohol.  Patient does not exercise.      CURRENT MEDICATIONS:  Current Outpatient Prescriptions   Medication Sig Dispense Refill     acetaminophen (TYLENOL) 325 MG tablet Take 2 tablets (650 mg) by mouth 3 times daily as needed for mild pain or fever (total acetaminophen dose should not exceed 3000 mg per day) 180 tablet 5     albuterol (PROAIR HFA, PROVENTIL  HFA, VENTOLIN HFA) 108 (90 BASE) MCG/ACT inhaler Inhale 2 puffs into the lungs every 6 hours as needed. 1 Inhaler 11     bisacodyl (DULCOLAX) 10 MG suppository Place 1 suppository (10 mg) rectally daily as needed for constipation 6 suppository 0     blood glucose (NO BRAND SPECIFIED) lancets standard Use to test blood sugar 5 times daily or as directed. 200 each 11     blood glucose monitoring (NO BRAND SPECIFIED) meter device kit Use to test blood sugar 3 times daily or as directed. 1 kit 1     blood glucose monitoring (NO BRAND SPECIFIED) test strip Use to test blood sugars 3 times daily or as directed 100 strip 11     Blood Glucose Monitoring Suppl (IBG STAR) W/DEVICE KIT -PLEASE GIVE PATIENT A DEVICE HER INSURANCE WILL COVER- 1 kit 0     buPROPion (WELLBUTRIN SR) 100 MG 12 hr tablet Take 1 tablet (100 mg) by mouth 2 times daily 180 tablet 1     capsaicin (ZOSTRIX) 0.025 % CREA Apply 1 g topically 3 times daily as needed 1 Tube 0     ciclopirox 8 % SOLN Externally apply topically daily To toenails. 1 Bottle 11     clotrimazole (LOTRIMIN) 1 % cream Apply topically 2 times daily 30 g 1     colchicine 0.6 MG tablet Take 1-2 tablets (0.6-1.2 mg) by mouth daily as needed for moderate pain 12 tablet 1     diclofenac (VOLTAREN) 1 % GEL topical gel Apply 4 grams to knees or 2 grams to hands four times daily using enclosed dosing card. 100 g 1     dulaglutide (TRULICITY) 1.5 MG/0.5ML pen Inject 1.5 mg Subcutaneous every 7 days 45 mL 3     DULoxetine (CYMBALTA) 20 MG capsule Take 1 capsule (20 mg) by mouth 2 times daily 60 capsule 0     Efinaconazole 10 % SOLN Externally apply topically daily To toenails. 8 mL 11     gabapentin (NEURONTIN) 300 MG capsule Take 2 capsules (600 mg) by mouth 2 times daily Needs PCP appt for further refills 120 capsule 0     hydrALAZINE (APRESOLINE) 25 MG tablet Take 1 tab (25 mg) in am, 2 tabs (50 mg) midday, and 3 tabs (75 mg) in pm daily 450 tablet 3     insulin glargine U-300 (TOUJEO) 300  UNIT/ML injection Inject 170 units SQ each am. 30 mL 3     levothyroxine (SYNTHROID/LEVOTHROID) 200 MCG tablet Take 1 tab Monday thru Saturday and 2 tabs on Sundays. 102 tablet 3     metoprolol succinate (TOPROL-XL) 25 MG 24 hr tablet Take 1 tablet (25 mg) by mouth At Bedtime 90 tablet 3     morphine (MS CONTIN) 15 MG 12 hr tablet Take 15 mg by mouth daily as needed       nicotine ([employer] NICOTINE) 14 MG/24HR patch 2h hr Place 1 patch onto the skin every 24 hours 30 patch 1     NOVOLOG FLEXPEN 100 UNIT/ML soln Inject 65 units with meals plus correction. Pt uses approx 190 units in 24 hrs. 180 mL 3     nystatin (MYCOSTATIN) cream Apply topically 2 times daily To toenails 90 g 6     order for DME Left foot 1 Units 0     order for DME Equipment being ordered:  0311-1899 $92   Plantar, fasciitis, LG, night splint 1 each 0     order for DME Equipment being ordered: Challenger Wide walker if available - patient needs seat, basket and brakes. 1 each 0     ORDER FOR DME Use your CPAP device as directed by your provider. Pressure change to min 13 max 18cwp 1 each 99     oxyCODONE-acetaminophen (PERCOCET) 5-325 MG per tablet Take 1 tablet by mouth every 8 hours as needed for moderate to severe pain (try to limit use, no further prescriptions until seen in pain clinic) 60 tablet 0     polyethylene glycol (MIRALAX/GLYCOLAX) powder        potassium chloride SA (POTASSIUM CHLORIDE) 20 MEQ CR tablet Take 2 tablets (40 mEq) by mouth 4 times daily 240 tablet 3     Respiratory Therapy Supplies (NEBULIZER COMPRESSOR) KIT 1 Device 4 times daily as needed. 1 kit 3     senna (SENOKOT) 8.6 MG tablet Take 1 tablet by mouth 2 times daily 45 tablet 0     spironolactone (ALDACTONE) 50 MG tablet Take 1 tablet (50 mg) by mouth daily 30 tablet 11     tiotropium (SPIRIVA HANDIHALER) 18 MCG inhalation capsule Inhale contents of one capsule daily. 30 capsule 1     topiramate (TOPAMAX) 25 MG tablet 25 mg at bedtime for 1 week, 50 mg at bedtime  for 1 week and 75 mg daily at bedtime thereafter 360 tablet 2     torsemide (DEMADEX) 100 MG tablet Take 1 tablet (100 mg) by mouth 2 times daily 180 tablet 1     warfarin (COUMADIN) 5 MG tablet Take 20 mg (four tabs) M and F and 15 mg (three tabs) all other days of the week or as directed by the Coumadin Clinic. 100 tablet 6       ROS:   Constitutional: No fever, chills, or sweats. No weight gain/loss.   ENT: No visual disturbance, ear ache, epistaxis, sore throat.   Allergies/Immunologic: Negative.   Respiratory: No cough, hemoptysis.   Cardiovascular: As per HPI.   GI: No nausea, vomiting, hematemesis, melena, or hematochezia.   : No urinary frequency, dysuria, or hematuria.   Integument: Negative.   Psychiatric: Negative.   Neuro: Negative.   Endocrinology: Negative.   Musculoskeletal: Negative.    EXAM:  /86 (BP Location: Left arm, Patient Position: Chair, Cuff Size: Adult Regular)  Pulse 77  Wt (!) 213.7 kg (471 lb 3.2 oz)  SpO2 100%  BMI 73.8 kg/m2  General: Obese female;  appears comfortable, alert and articulate; She gets SOB with getting on exam table  Eyes: anicteric sclera, EOMI  Neck: no adenopathy  Orophyarynx: moist mucosa, no lesions, dentition intact  Heart: regular, S1/S2, no murmur, gallop, rub, estimated JVP 8cm  Lungs: clear, no rales or wheezing  Abdomen: obese; soft, non-tender, bowel sounds present, no hepatosplenomegaly  Extremities: no clubbing, cyanosis or edema  Neurological: normal speech and affect, no gross motor deficits    Labs:  CBC RESULTS:  Lab Results   Component Value Date    WBC 13.6 (H) 05/31/2017    RBC 4.35 05/31/2017    HGB 15.9 (H) 05/29/2018    HCT 39.6 05/31/2017    MCV 91 05/31/2017    MCH 29.2 05/31/2017    MCHC 32.1 05/31/2017    RDW 15.4 (H) 05/31/2017     05/31/2017     CMP RESULTS:  Lab Results   Component Value Date     06/13/2018    POTASSIUM 3.5 06/13/2018    CHLORIDE 102 06/13/2018    CO2 25 06/13/2018    ANIONGAP 9 06/13/2018    GLC  168 (H) 06/13/2018    BUN 26 06/13/2018    CR 1.45 (H) 06/13/2018    GFRESTIMATED 39 (L) 06/13/2018    GFRESTBLACK 47 (L) 06/13/2018    JUSTIN 8.2 (L) 06/13/2018    BILITOTAL 0.4 05/29/2018    ALBUMIN 3.6 05/29/2018    ALKPHOS 85 05/29/2018    ALT 20 05/29/2018    AST 12 05/29/2018      INR RESULTS:  Lab Results   Component Value Date    INR 1.74 (H) 06/19/2018     Lab Results   Component Value Date    MAG 1.8 01/06/2015     Lab Results   Component Value Date    NTBNPI 30 02/23/2015     Lab Results   Component Value Date    NTBNP 37 12/12/2017     Assessment and Plan:   1. Chronic systolic heart failure secondary to NICM.    Stage C  NYHA Class III  ACEi/ARB yes  BB no - she was off her Toprol XL for unclear reasons. This will be started today at 25mg daily.  Aldosterone antagonist yes  SCD prophylaxis does not meet criteria for implant  % BiV pacing: N/A  Fluid status  hypervoliemic - Metolazone 2.5mg BID added to regime    2. AFib: - now in sinus rhythm - rate controlled - continue current dose of atenolol    3. Ckd: suspect due to diabetic nephropathy -    4. Morbid obesity    5. Tobacco use: pt states she is cutting down, but is still using tobacco regularly.     6. HTN; suboptimal control - suspect due to volume overload    7. Morbid obesity - unchanged    8. DM: managed by PCP    9. Hypothyroidism: on Synthroid - has had radiation therapy    Follow-up 1 week to assess fluid status/labs    Please do not hesitate to contact me if you have any questions/concerns.     Sincerely,       YOCASTA Cross CNP    CC  Patient Care Team:  Jamilah Bernal MD as PCP - General (Internal Medicine)  Percy Alcala MD as MD (Cardiology)  Silva Damon MD as MD (Internal Medicine)  Norman Jean DPM (Podiatry)  Zita Liao as Registered Nurse (Diabetic Education)  Steph Kim PA-C as Physician Assistant (Physician Assistant)  Delmi Orellana MD as MD  (Ophthalmology)  Nelly Miranda MD as MD (Ophthalmology)  Drea Rosas, RN as Clinic Care Coordinator (Bariatric)  Isela Archibald PA-C as Physician Assistant (Physician Assistant)  Tom Guardado MD as MD (Ophthalmology)  Russel Vergara OD as MD (Optometry)  Ophelia Kenny, RN as Nurse Coordinator (Cardiology)  Kristen Cuello APRN CNP as Nurse Practitioner (Cardiology)  SELF, REFERRED

## 2018-06-19 NOTE — NURSING NOTE
Chief Complaint   Patient presents with     Follow Up For     Return core w labs prior ok per rajan     Vitals were taken and medications were reconciled.     Delmy Bower RMA  12:18 PM

## 2018-06-19 NOTE — MR AVS SNAPSHOT
Pricilla Brown   6/19/2018   Anticoagulation Therapy Visit    Description:  43 year old female   Provider:  Demi Kebede, RN   Department:  [facility]           INR as of 6/19/2018     Today's INR 1.74!      Anticoagulation Summary as of 6/19/2018     INR goal 2.0-2.5   Today's INR 1.74!   Full warfarin instructions 6/19: 20 mg; Otherwise 20 mg on Mon, Fri; 15 mg all other days   Next INR check 6/28/2018    Indications   Atrial fibrillation (H) [I48.91] [I48.91]  Long-term (current) use of anticoagulants [Z79.01] [Z79.01]         June 2018 Details    Sun Mon Tue Wed Thu Fri Sat          1               2                 3               4               5               6               7               8               9                 10               11               12               13               14               15               16                 17               18               19      20 mg   See details      20      15 mg         21      15 mg         22      20 mg         23      15 mg           24      15 mg         25      20 mg         26      15 mg         27      15 mg         28            29               30                Date Details   06/19 This INR check       Date of next INR:  6/28/2018         How to take your warfarin dose     To take:  15 mg Take 3 of the 5 mg tablets.    To take:  20 mg Take 4 of the 5 mg tablets.

## 2018-06-20 ENCOUNTER — PRE VISIT (OUTPATIENT)
Dept: CARDIOLOGY | Facility: CLINIC | Age: 43
End: 2018-06-20

## 2018-06-20 DIAGNOSIS — I50.32 CHRONIC DIASTOLIC HEART FAILURE (H): Primary | ICD-10-CM

## 2018-06-20 LAB — INTERPRETATION ECG - MUSE: NORMAL

## 2018-06-27 ENCOUNTER — TELEPHONE (OUTPATIENT)
Dept: CARDIOLOGY | Facility: CLINIC | Age: 43
End: 2018-06-27

## 2018-06-27 NOTE — TELEPHONE ENCOUNTER
FUTURE VISIT INFORMATION      FUTURE VISIT INFORMATION:    Date: 10/17/18    Time: 830am    Location: [facility]  REFERRAL INFORMATION:    Referring provider:  PALAK MORALES    Referring providers clinic:  [facility]    Reason for visit/diagnosis  Neuro-ophthalmology consult/ Hypertensive retinopathy of both eyes    RECORDS REQUESTED FROM:       Clinic name Comments Records Status Imaging Status   [facility]  Notes in EPic In Epic

## 2018-06-27 NOTE — TELEPHONE ENCOUNTER
[facility] Call Center    Phone Message    May a detailed message be left on voicemail: yes    Reason for Call: Symptoms or Concerns     If patient has red-flag symptoms, warm transfer to triage line    Current symptom or concern: Pt retaining a lot of water, more since last visit and wants to speak with a nurse.     Symptoms have been present for:  8 day(s)    Has patient previously been seen for this? Yes    By Kristen Cuello NP    Date: 06/19/2018    Are there any new or worsening symptoms? Yes:       Action Taken: Message routed to:  [facility] ([facility]): Cardiology Clinic

## 2018-06-27 NOTE — TELEPHONE ENCOUNTER
"I called Pricilla at 11:30, because she still wasn't here and also just now at 1pm.  She states \"I'm still trying to get a ride, but I'm actually just starting to feel better.\" I explain to her I can't get her in to see the Provider at this point because her clinic is now almost over, but I want her to go to the ER.  Pricilla restates she's feeling better. She agrees to have labs done tomorrow and agrees that if she starts feeling worse again that she will go to the ER.  Ophelia Kenny RN   "

## 2018-06-27 NOTE — TELEPHONE ENCOUNTER
"I called Pricilla back.  She sounded upset/distressed. She states she missed her Return CORE appt yesterday because she was so short-of-breath she could only make it a few feet before not breathing well.  We discuss her meds; she is currently taking torsemide 100mg BID and metolazone 2.5mg BID (30 minutes before the torsemide dose).  Pricilla states she thinks she's been urinating more since Kristen Cuate started the metolazone last week, but she states \"it just keeps coming back on\".  She states she is barely sleeping, and can only sleep when sitting completely upright. Pricilla is not weighing herself daily, states \"I need a new battery in my scale\".  I express my concerns for Pricilla and explain I'm concerned her kidneys might not by responding well to the medications and that she must come in for labs and an appt (potential IV diuretics) today. I ask her to come in now.  She states she will work no getting a ride from a friend.  Will forward to Ximena Carpenter NP.    Ophelia Kenny RN   "

## 2018-07-02 ENCOUNTER — ANTICOAGULATION THERAPY VISIT (OUTPATIENT)
Dept: ANTICOAGULATION | Facility: CLINIC | Age: 43
End: 2018-07-02

## 2018-07-02 ENCOUNTER — ALLIED HEALTH/NURSE VISIT (OUTPATIENT)
Dept: SURGERY | Facility: CLINIC | Age: 43
End: 2018-07-02
Payer: MEDICARE

## 2018-07-02 DIAGNOSIS — Z79.01 LONG-TERM (CURRENT) USE OF ANTICOAGULANTS: ICD-10-CM

## 2018-07-02 DIAGNOSIS — I48.91 ATRIAL FIBRILLATION, UNSPECIFIED TYPE (H): ICD-10-CM

## 2018-07-02 DIAGNOSIS — I48.91 ATRIAL FIBRILLATION (H): ICD-10-CM

## 2018-07-02 DIAGNOSIS — I50.32 CHRONIC DIASTOLIC HEART FAILURE (H): ICD-10-CM

## 2018-07-02 LAB
ANION GAP SERPL CALCULATED.3IONS-SCNC: 8 MMOL/L (ref 3–14)
BUN SERPL-MCNC: 14 MG/DL (ref 7–30)
CALCIUM SERPL-MCNC: 9 MG/DL (ref 8.5–10.1)
CHLORIDE SERPL-SCNC: 107 MMOL/L (ref 94–109)
CO2 SERPL-SCNC: 24 MMOL/L (ref 20–32)
CREAT SERPL-MCNC: 1.01 MG/DL (ref 0.52–1.04)
GFR SERPL CREATININE-BSD FRML MDRD: 59 ML/MIN/1.7M2
GLUCOSE SERPL-MCNC: 165 MG/DL (ref 70–99)
INR PPP: 2.93 (ref 0.86–1.14)
POTASSIUM SERPL-SCNC: 3.8 MMOL/L (ref 3.4–5.3)
SODIUM SERPL-SCNC: 139 MMOL/L (ref 133–144)

## 2018-07-02 NOTE — PATIENT INSTRUCTIONS
GOALS:  Relating To Eating:  -Follow the Modified Liquid Diet for weight loss:    *Try having 1 protein shake for breakfast, 1 meal for lunch, and 1 protein shake for supper on Tuesdays, Thursdays, and Saturdays.  Have 1 protein shake for breakfast and 2 meals on the other days of the week.    Breakfast: Protein Shake (250 Jagjit, 35 g protein)  Lunch: 3 oz lean protein (chicken/turkey breast or fish)  + non-starchy vegetables (non-starchy veg: green leafy vegetables, cucumbers, broccoli, cauliflower, green string beans)  Supper: Protein Shake (250 Jagjit, 35 g protein)  Snack: 1 protein bar + non-starchy vegetables (no calorie-containing dips/condiments)  Beverages: at least 48-64 oz water between meals daily (Powerade Zero or Propel Zero or Crystal Light or Isael, less than 5 Calories per serving)    *Protein Shake Criteria: no more than 250 Calories, at least 20 grams of protein, and less than 10 grams of sugar     Frozen Meal Replacements  Healthy Choice  Lean Cuisine  Atkins Meals  Smart Ones    -Continue eating slowly (20-30 minutes per meal), chewing foods well (25 chews per bite/applesauce consistency)    -Increase exercise as able.  (Pool therapy, walking, etc)    -Continue to work on quitting smoking.

## 2018-07-02 NOTE — PROGRESS NOTES
ANTICOAGULATION FOLLOW-UP CLINIC VISIT    Patient Name:  Pricilla Brown  Date:  7/2/2018  Contact Type:  Telephone    SUBJECTIVE:     Patient Findings     Positives Change in diet/appetite (Pt plans to eat one small extra serving of Vit K rich food today.), No Problem Findings           OBJECTIVE    INR   Date Value Ref Range Status   07/02/2018 2.93 (H) 0.86 - 1.14 Final     Chromogenic Factor 10   Date Value Ref Range Status   04/08/2017 19 (L) 70 - 130 % Final     Comment:     Therapeutic Range:  A Chromogenic Factor 10 level of approximately 20-40%   inversely correlates with an INR of 2-3 for patients receiving Warfarin.   Chromogenic Factor 10 levels below 20% indicate an INR greater than 3 and   levels above 40% indicate an INR less than 2.         ASSESSMENT / PLAN  INR assessment THER    Recheck INR In: 2 WEEKS    INR Location Clinic      Anticoagulation Summary as of 7/2/2018     INR goal 2.0-2.5   Today's INR 2.93!   Warfarin maintenance plan 20 mg (5 mg x 4) on Mon, Fri; 15 mg (5 mg x 3) all other days   Full warfarin instructions 7/2: 15 mg; Otherwise 20 mg on Mon, Fri; 15 mg all other days   Weekly warfarin total 115 mg   Plan last modified Velvet Asencio RN (3/15/2017)   Next INR check 7/16/2018   Priority INR   Target end date Indefinite    Indications   Atrial fibrillation (H) [I48.91] [I48.91]  Long-term (current) use of anticoagulants [Z79.01] [Z79.01]         Anticoagulation Episode Summary     INR check location Clinic Lab    Preferred lab     Send INR reminders to [facility]    Comments Contact Patient at [phone]      Anticoagulation Care Providers     Provider Role Specialty Phone number    Percy Alcala MD  Cardiology [phone]            See the Encounter Report to view Anticoagulation Flowsheet and Dosing Calendar (Go to Encounters tab in chart review, and find the Anticoagulation Therapy Visit)    Spoke with patient.    Dorene Edge, TA              Addendum 7/11/18 Sent refill of Warfarin 5mg tablets to  Pharmacy per pt's request. Demi Kebede RN

## 2018-07-02 NOTE — MR AVS SNAPSHOT
MRN:[id number]                      After Visit Summary   7/2/2018    Pricilla Brown    MRN: [id number]           Visit Information        Provider Department      7/2/2018 1:30 PM Paulette Ruiz RD [facility] Surgical Weight Management        Your next 10 appointments already scheduled     Jul 06, 2018  8:30 AM CDT   Pool Evaluation with Chantal Chavis, PT   [facility] Physical Therapy ([facility])    [address] [place] [address]  [address]  [facility] [address]   [phone]            Jul 11, 2018  9:00 AM CDT   (Arrive by 8:45 AM)   NEW NEURO with Vladislav Samuel MD   [facility] Ophthalmology ([facility] [place])    [address] [place] [id number] [phone]            Jul 16, 2018 11:00 AM CDT   Return Sleep Patient with YOCASTA Horan [facility], Sleep Study ([facility])    [address] [place] [id number] [phone]            Jul 27, 2018 10:45 AM CDT   (Arrive by 10:30 AM)   Return Weight Management Visit with SHANE Murcia [facility] Weight Management ([email or web address] [place])    [address] [place] [address] [phone]            Aug 03, 2018  3:00 PM CDT   (Arrive by 2:45 PM)   RETURN DIABETES with SHANE Rojas [facility] Endocrinology ([facility] [place])    [address] [place] [id number] [phone]            Oct 09, 2018  8:45 AM CDT   (Arrive by 8:30 AM)   Return Visit with Silva Damon MD   [facility] Medical Weight Management ([email or web address] [place])    [address] [place] [id number] [phone]              Care Instructions    GOALS:  Relating To Eating:  -Follow the Modified Liquid Diet for weight loss:    *Try having 1 protein  shake for breakfast, 1 meal for lunch, and 1 protein shake for supper on Tuesdays, Thursdays, and Saturdays.  Have 1 protein shake for breakfast and 2 meals on the other days of the week.    Breakfast: Protein Shake (250 Jagjit, 35 g protein)  Lunch: 3 oz lean protein (chicken/turkey breast or fish)  + non-starchy vegetables (non-starchy veg: green leafy vegetables, cucumbers, broccoli, cauliflower, green string beans)  Supper: Protein Shake (250 Jagjit, 35 g protein)  Snack: 1 protein bar + non-starchy vegetables (no calorie-containing dips/condiments)  Beverages: at least 48-64 oz water between meals daily (Powerade Zero or Propel Zero or Crystal Light or [place], less than 5 Calories per serving)    *Protein Shake Criteria: no more than 250 Calories, at least 20 grams of protein, and less than 10 grams of sugar     Frozen Meal Replacements  Healthy Choice  Lean Cuisine  Atkins Meals  Smart Ones    -Continue eating slowly (20-30 minutes per meal), chewing foods well (25 chews per bite/applesauce consistency)    -Increase exercise as able.  (Pool therapy, walking, etc)    -Continue to work on quitting smoking.                [employer] Information     [employer] gives you secure access to your electronic health record. If you see a primary care provider, you can also send messages to your care team and make appointments. If you have questions, please call your primary care clinic.  If you do not have a primary care provider, please call [phone] and they will assist you.      [employer] is an electronic gateway that provides easy, online access to your medical records. With [employer], you can request a clinic appointment, read your test results, renew a prescription or communicate with your care team.     To access your existing account, please contact your [facility] or call [phone] for assistance.        Care EveryWhere ID     This is your Care EveryWhere ID. This could be used by other  organizations to access your [place] medical records  [id number]        Equal Access to Services     KIERSTEN DRIVER : Maryam Woodward, malinda campos, bola weber. [facility] [phone].    ATENCIÓN: Si habla español, tiene a de la rosa disposición servicios gratuitos de asistencia lingüística. Llame al [phone].    We comply with applicable federal civil rights laws and [place] laws. We do not discriminate on the basis of race, color, national origin, age, disability, sex, sexual orientation, or gender identity.

## 2018-07-02 NOTE — MR AVS SNAPSHOT
Pricilla Brown   7/2/2018   Anticoagulation Therapy Visit    Description:  43 year old female   Provider:  Dorene Edge RN   Department:  [facility]           INR as of 7/2/2018     Today's INR 2.93!      Anticoagulation Summary as of 7/2/2018     INR goal 2.0-2.5   Today's INR 2.93!   Full warfarin instructions 7/2: 15 mg; Otherwise 20 mg on Mon, Fri; 15 mg all other days   Next INR check 7/16/2018    Indications   Atrial fibrillation (H) [I48.91] [I48.91]  Long-term (current) use of anticoagulants [Z79.01] [Z79.01]         July 2018 Details    Sun Mon Tue Wed Thu Fri Sat     1               2      15 mg   See details      3      15 mg         4      15 mg         5      15 mg         6      20 mg         7      15 mg           8      15 mg         9      20 mg         10      15 mg         11      15 mg         12      15 mg         13      20 mg         14      15 mg           15      15 mg         16            17               18               19               20               21                 22               23               24               25               26               27               28                 29               30               31                    Date Details   07/02 This INR check       Date of next INR:  7/16/2018         How to take your warfarin dose     To take:  15 mg Take 3 of the 5 mg tablets.    To take:  20 mg Take 4 of the 5 mg tablets.

## 2018-07-02 NOTE — PROGRESS NOTES
"  Bariatric Nutrition Consultation Note    Reason For Visit: Nutrition Reassessment    Pricilla Brown is a 43 year-old (type 2 DM on insulin sliding scale) presenting today for bariatric nutrition consult.  Pt is interested in laparoscopic sleeve gastrectomy.  Pt has met all required RD visits prior to surgery.  Pt was referred by NANETTE Kerr and Dr. Silva Damon.    Coordination Notes:,   5/29/18: In light of it being several months before anticipating surgery, writer will revisit task list when Pt is closer to weight loss goal.  Writer will continue to encourage Pt to come at least monthly to see RD.    ANTHROPOMETRICS:  Ht: 67\"  Initial Wt: 462.6 lbs with BMI of 72.6  Current Wt: 469 lbs (+ 2.2 lbs over the past month; + 6.4 lbs from initial weight)     *Pt is retaining fluid and recently was put on another diuretic to help per her report (7/2/18).     Required weight loss goal pre-op: -46 lbs from initial consult weight (goal weight 416.6 lbs or less before surgery)    NUTRITION HISTORY:  Food Allergies/Intolerances: none known  Cravings: sweets, chips, pop (diet)  Triggers/cues causing extra eating: boredom (currently on disability, not working)  *Pt is on topiramate per Dr. Silva Damon who would like Pt to re-try modified liquid diet with 2 shakes/day.    Progress with Previous Goals:  Relating To Eating:  -Follow the Modified Liquid Diet for weight loss:  Breakfast: Protein Shake (250 Jagjit, 35 g protein) - Pt has been using Premier Protein for breakfast.   Lunch: 3 oz lean protein (chicken/turkey breast or fish)  + non-starchy vegetables (non-starchy veg: green leafy vegetables, cucumbers, broccoli, cauliflower, green string beans) - Pt had ravioli containing meal with sauce yesterday for lunch.   Supper: Protein Shake (250 Jagjit, 35 g protein) - Pt had peanut butter stew (containing tomato paste, hot peppers, and beef) over rice.   Snack: 1 protein bar + non-starchy vegetables (no " calorie-containing dips/condiments) - Pt had 2 oranges yesterday.  The day before that, she had 1 peach.   Beverages: at least 48-64 oz water between meals daily (Powerade Zero or Propel Zero or Crystal Light or [place], less than 5 Calories per serving)  - Pt is limited to 2 Liters of fluids/day.  Pt has been watching her salt (no adding salt and no packaged foods).     *Protein Shake Criteria: no more than 250 Calories, at least 20 grams of protein, and less than 10 grams of sugar     Frozen Meal Replacements  Healthy Choice  Lean Cuisine  Atkins Meals  Smart Ones    -Continue eating slowly (20-30 minutes per meal), chewing foods well (25 chews per bite/applesauce consistency) - Meeting.     -Increase exercise as able.  (Pool therapy, walking, etc) - Pt wasn't able to go to her pool therapy last week due to atrial fibrillation.  She has been okayed to go ahead with pool therapy now - restarting tomorrow.     -Continue to work on quitting smoking.  - Pt has only smoked 2-3 cigarettes per day for the past 2-3 days.     NUTRITION DIAGNOSIS:  Obesity r/t long history of self-monitoring deficit and excessive energy intake aeb BMI >30.- continues    INTERVENTION:  Intervention Provided/Education Provided:  Discussed potential barriers to weight loss (recent fluid retention issue; high calorie peanut/rice soup).  Reviewed previous goals, discussing importance of trying the 2 meal replacement shakes/day as Dr. Silva Damon recommended; Pt agreed to try it 3 days/week.  Gave encouragement and support.  Provided Pt with list of goals and task list.      Note: Pt stated she did not purchase clinic pharmaceutical grade meal replacements due to financial challenges.     Patient Understanding: good  Expected Compliance: good      GOALS:  Relating To Eating:  -Follow the Modified Liquid Diet for weight loss:    *Try having 2 protein shake meal replacements per day on Tuesdays, Thursdays, and Saturdays.  Have 1 protein shake  per day on the other days of the week.    Breakfast: Protein Shake (250 Jagjit, 35 g protein)  Lunch: 3 oz lean protein (chicken/turkey breast or fish)  + non-starchy vegetables (non-starchy veg: green leafy vegetables, cucumbers, broccoli, cauliflower, green string beans)  Supper: Protein Shake (250 Jagjit, 35 g protein)  Snack: 1 protein bar + non-starchy vegetables (no calorie-containing dips/condiments)  Beverages: at least 48-64 oz water between meals daily (Powerade Zero or Propel Zero or Crystal Light or [place], less than 5 Calories per serving)    *Protein Shake Criteria: no more than 250 Calories, at least 20 grams of protein, and less than 10 grams of sugar     Frozen Meal Replacements  Healthy Choice  Lean Cuisine  Atkins Meals  Smart Ones    -Continue eating slowly (20-30 minutes per meal), chewing foods well (25 chews per bite/applesauce consistency)    -Increase exercise as able.  (Pool therapy, walking, etc)    -Continue to work on quitting smoking.       Follow-Up: follow-up with RD in 1 month.      Time spent with patient: 30 minutes     Paulette Ruiz, MS, RDN, LDN, CLT  Pager: [phone]

## 2018-07-09 ENCOUNTER — TELEPHONE (OUTPATIENT)
Dept: ENDOCRINOLOGY | Facility: CLINIC | Age: 43
End: 2018-07-09

## 2018-07-11 ENCOUNTER — PRE VISIT (OUTPATIENT)
Dept: OPHTHALMOLOGY | Facility: CLINIC | Age: 43
End: 2018-07-11

## 2018-07-11 ENCOUNTER — ANTICOAGULATION THERAPY VISIT (OUTPATIENT)
Dept: ANTICOAGULATION | Facility: CLINIC | Age: 43
End: 2018-07-11

## 2018-07-11 DIAGNOSIS — I48.91 ATRIAL FIBRILLATION (H): ICD-10-CM

## 2018-07-11 DIAGNOSIS — Z79.4 TYPE 2 DIABETES MELLITUS WITH HYPERGLYCEMIA, WITH LONG-TERM CURRENT USE OF INSULIN (H): ICD-10-CM

## 2018-07-11 DIAGNOSIS — H53.10 SUBJECTIVE VISUAL DISTURBANCE: Primary | ICD-10-CM

## 2018-07-11 DIAGNOSIS — E11.65 TYPE 2 DIABETES MELLITUS WITH HYPERGLYCEMIA, WITH LONG-TERM CURRENT USE OF INSULIN (H): ICD-10-CM

## 2018-07-11 DIAGNOSIS — Z79.01 LONG-TERM (CURRENT) USE OF ANTICOAGULANTS: ICD-10-CM

## 2018-07-11 DIAGNOSIS — I48.91 ATRIAL FIBRILLATION, UNSPECIFIED TYPE (H): ICD-10-CM

## 2018-07-11 LAB — INR PPP: 2.36 (ref 0.86–1.14)

## 2018-07-11 RX ORDER — WARFARIN SODIUM 5 MG/1
TABLET ORAL
Qty: 90 TABLET | Refills: 3 | Status: SHIPPED | OUTPATIENT
Start: 2018-07-11 | End: 2019-04-11

## 2018-07-11 NOTE — TELEPHONE ENCOUNTER
[facility] Call Center    Phone Message    May a detailed message be left on voicemail: no    Reason for Call: Medication Refill Request    Has the patient contacted the pharmacy for the refill? Yes   Name of medication being requested: gabapentin  Provider who prescribed the medication: Isela Archibald  [facility]: [facility]  Date medication is needed: ASAP         Action Taken: Message routed to:  [facility] ([facility]): Ned

## 2018-07-11 NOTE — MR AVS SNAPSHOT
Pricilla Brown   7/11/2018   Anticoagulation Therapy Visit    Description:  43 year old female   Provider:  Velvet Khan, RN   Department:  [facility]           INR as of 7/11/2018     Today's INR 2.36      Anticoagulation Summary as of 7/11/2018     INR goal 2.0-2.5   Today's INR 2.36   Full warfarin instructions 20 mg on Mon, Fri; 15 mg all other days   Next INR check 7/25/2018    Indications   Atrial fibrillation (H) [I48.91] [I48.91]  Long-term (current) use of anticoagulants [Z79.01] [Z79.01]         July 2018 Details    Sun Mon Tue Wed Thu Fri Sat     1               2               3               4               5               6               7                 8               9               10               11      15 mg   See details      12      15 mg         13      20 mg         14      15 mg           15      15 mg         16      20 mg         17      15 mg         18      15 mg         19      15 mg         20      20 mg         21      15 mg           22      15 mg         23      20 mg         24      15 mg         25            26               27               28                 29               30               31                    Date Details   07/11 This INR check       Date of next INR:  7/25/2018         How to take your warfarin dose     To take:  15 mg Take 3 of the 5 mg tablets.    To take:  20 mg Take 4 of the 5 mg tablets.

## 2018-07-11 NOTE — PROGRESS NOTES
ANTICOAGULATION FOLLOW-UP CLINIC VISIT    Patient Name:  Pricilla Brown  Date:  7/11/2018  Contact Type:  Telephone    SUBJECTIVE:        OBJECTIVE    INR   Date Value Ref Range Status   07/11/2018 2.36 (H) 0.86 - 1.14 Final     Chromogenic Factor 10   Date Value Ref Range Status   04/08/2017 19 (L) 70 - 130 % Final     Comment:     Therapeutic Range:  A Chromogenic Factor 10 level of approximately 20-40%   inversely correlates with an INR of 2-3 for patients receiving Warfarin.   Chromogenic Factor 10 levels below 20% indicate an INR greater than 3 and   levels above 40% indicate an INR less than 2.         ASSESSMENT / PLAN  INR assessment THER    Recheck INR In: 2 WEEKS    INR Location Clinic      Anticoagulation Summary as of 7/11/2018     INR goal 2.0-2.5   Today's INR 2.36   Warfarin maintenance plan 20 mg (5 mg x 4) on Mon, Fri; 15 mg (5 mg x 3) all other days   Full warfarin instructions 20 mg on Mon, Fri; 15 mg all other days   Weekly warfarin total 115 mg   No change documented Velvet Khan RN   Plan last modified Velvet Asencio RN (3/15/2017)   Next INR check 7/25/2018   Priority INR   Target end date Indefinite    Indications   Atrial fibrillation (H) [I48.91] [I48.91]  Long-term (current) use of anticoagulants [Z79.01] [Z79.01]         Anticoagulation Episode Summary     INR check location Clinic Lab    Preferred lab     Send INR reminders to [facility]    Comments Contact Patient at [phone]      Anticoagulation Care Providers     Provider Role Specialty Phone number    Percy Alcala MD  Cardiology [phone]            See the Encounter Report to view Anticoagulation Flowsheet and Dosing Calendar (Go to Encounters tab in chart review, and find the Anticoagulation Therapy Visit)    Left message for patient with results and dosing recommendations. Asked patient to call back to report any missed doses, falls, signs and symptoms of bleeding or clotting, any changes in  health, medication, or diet. Asked patient to call back with any questions or concerns.     Velvet Khan RN

## 2018-07-12 NOTE — TELEPHONE ENCOUNTER
"Gabapentin      Last Written Prescription Date:  4-2-18  Last Fill Quantity: 120,   # refills: 0  Last Office Visit :   5-29-18 [facility]  [facility]  5-31-17 PCP    Future Office visit:  8-3-18    Routing refill request to provider for review/approval because:  Not on protocol and -   Last refill states pt \"needs PCP appt for further refills\"??? Pt has not seen PCP and has not scheduled with PCP. Does this mean that management is turned over to PCP? Is patient aware?      Kathleen M Doege RN        "

## 2018-07-15 ENCOUNTER — HEALTH MAINTENANCE LETTER (OUTPATIENT)
Age: 43
End: 2018-07-15

## 2018-07-18 ENCOUNTER — APPOINTMENT (OUTPATIENT)
Dept: SLEEP MEDICINE | Facility: CLINIC | Age: 43
End: 2018-07-18
Payer: MEDICARE

## 2018-07-18 RX ORDER — GABAPENTIN 300 MG/1
600 CAPSULE ORAL 2 TIMES DAILY
Qty: 120 CAPSULE | Refills: 0 | Status: SHIPPED | OUTPATIENT
Start: 2018-07-18 | End: 2018-12-07

## 2018-07-19 ENCOUNTER — DOCUMENTATION ONLY (OUTPATIENT)
Dept: SLEEP MEDICINE | Facility: CLINIC | Age: 43
End: 2018-07-19
Payer: MEDICARE

## 2018-07-19 ENCOUNTER — OFFICE VISIT (OUTPATIENT)
Dept: SLEEP MEDICINE | Facility: CLINIC | Age: 43
End: 2018-07-19
Payer: MEDICARE

## 2018-07-19 VITALS
HEIGHT: 67 IN | OXYGEN SATURATION: 97 % | SYSTOLIC BLOOD PRESSURE: 119 MMHG | WEIGHT: 293 LBS | DIASTOLIC BLOOD PRESSURE: 78 MMHG | BODY MASS INDEX: 45.99 KG/M2 | RESPIRATION RATE: 20 BRPM | HEART RATE: 88 BPM

## 2018-07-19 DIAGNOSIS — G47.8 UNHEALTHY SLEEP HABIT: ICD-10-CM

## 2018-07-19 DIAGNOSIS — G47.33 OSA (OBSTRUCTIVE SLEEP APNEA): Primary | ICD-10-CM

## 2018-07-19 PROCEDURE — 99214 OFFICE O/P EST MOD 30 MIN: CPT | Performed by: NURSE PRACTITIONER

## 2018-07-19 NOTE — PATIENT INSTRUCTIONS
Try to remove hose from mask with bathroom use    Enter bed between 10-11 to 9Am    Get a couple of naps, brief on a timer    Out of bed but relaxing : multitask    Your BMI is Body mass index is 72.67 kg/(m^2).    What is BMI? Discussed at visit-hoping to get bypass surgery  Body mass index (BMI) is one way to tell whether you are at a healthy weight, overweight, or obese. It measures your weight in relation to your height.  A BMI of 18.5 to 24.9 is in the healthy range. A person with a BMI of 25 to 29.9 is considered overweight, and someone with a BMI of 30 or greater is considered obese.  Another way to find out if you are at risk for health problems caused by overweight and obesity is to measure your waist. If you are a woman and your waist is more than 35 inches, or if you are a man and your waist is more than 40 inches, your risk of disease may be higher.  More than two-thirds of American adults are considered overweight or obese. Being overweight or obese increases the risk for further weight gain.  Excess weight may lead to heart disease and diabetes. Creating and following plans for healthy eating and physical activity may help you improve your health.    Methods for maintaining or losing weight.  Weight control is part of healthy lifestyle and includes exercise, emotional health, and healthy eating habits.  Careful eating habits lifelong is the mainstay of weight control.  Though there are significant health benefits from weight loss, long-term weight loss with diet alone may be very difficult to achieve- studies show long-term success with dietary management in less than 10% of people. Attaining a healthy weight may be especially difficult to achieve in those with severe obesity. In some cases, medications, devices and surgical management might be considered.    What can you do?  If you are overweight or obese and are interested in methods for weight loss, you should discuss this with your provider. In  addition, we recommend that you review healthy life styles and methods for weight loss available through the National [employer] of Health patient information sites:   http://win.niddk.nih.gov/publications/index.htm

## 2018-07-19 NOTE — PROGRESS NOTES
.    Cc: Patient returns here today in follow up for complaince and response to cpap and to evaluate fragmented sleep    HPI:History of severe obstructive sleep apnea with a polysomnogram, 03/2013, showing an AHI of 68.9.  Time less than 89% at 3.1 minutes.   Largest concern in past has been missed opportunities.  Overnight oximetry done on 05/23/2016 showed O2 sats less than 89%, 48 seconds. Wt 468#.      3/17 visit:Use of APAP 29/30 days.  Average usage on days used 8 hours and 53 minutes, 90th percentile pressure 15.3 cm of water.Residual AHI is 0.9.  Daily usage shows some pancaking while she was sick with an upper respiratory as well as watching videos at nights and having CPAP on just in case she would fall asleep ( previous intervention to  all nights rest, but overextended)    New concerns:ripped mask, too much time in bed-much with cpap mask on in case she'll fall asleep      Sleep Review of Systems:     Snoring, snort arousals, gasping while on therapy: occasional     Bedpartner c/o's of cpap: loud mask leak     Heated humidity problems with rainout/excessive dryness, sore throat: humidity off     Opening of mouth while on therapy/excessive leak: ff      Problems falling or staying asleep with cpap: some apneic arousals   RLS: no      Response to therapy:good quality of sleep with cpap, some apneas  Willingness to continue: yes    Equipment issues: mask she likes, cushion repeatedly tears    Download today:apap 5-15 cm h20, 27/30 days with usage >=4 hrs, avg usage days used 10:17, leaks 34.6l/min at 95th percentile, pressure 14.9cm h20 at 95th percentile, residual AHI 11.2 (unknown 3.0, OAI 2.9, Hi 5.2, RERA index 4.9)    Full report scanned into medical record.    Allergies:    Allergies   Allergen Reactions     Penicillins Other (See Comments)     CHILDHOOD ALLERGY     Ibuprofen Sodium Hives and Rash       Medications:    Current Outpatient Prescriptions   Medication Sig Dispense Refill      acetaminophen (TYLENOL) 325 MG tablet Take 2 tablets (650 mg) by mouth 3 times daily as needed for mild pain or fever (total acetaminophen dose should not exceed 3000 mg per day) 180 tablet 5     albuterol (PROAIR HFA, PROVENTIL HFA, VENTOLIN HFA) 108 (90 BASE) MCG/ACT inhaler Inhale 2 puffs into the lungs every 6 hours as needed. 1 Inhaler 11     bisacodyl (DULCOLAX) 10 MG suppository Place 1 suppository (10 mg) rectally daily as needed for constipation 6 suppository 0     blood glucose (NO BRAND SPECIFIED) lancets standard Use to test blood sugar 5 times daily or as directed. 200 each 11     blood glucose monitoring (NO BRAND SPECIFIED) meter device kit Use to test blood sugar 3 times daily or as directed. 1 kit 1     blood glucose monitoring (NO BRAND SPECIFIED) test strip Use to test blood sugars 3 times daily or as directed 100 strip 11     Blood Glucose Monitoring Suppl (IBG STAR) W/DEVICE KIT -PLEASE GIVE PATIENT A DEVICE HER INSURANCE WILL COVER- 1 kit 0     buPROPion (WELLBUTRIN SR) 100 MG 12 hr tablet Take 1 tablet (100 mg) by mouth 2 times daily 180 tablet 1     capsaicin (ZOSTRIX) 0.025 % CREA Apply 1 g topically 3 times daily as needed 1 Tube 0     ciclopirox 8 % SOLN Externally apply topically daily To toenails. 1 Bottle 11     clotrimazole (LOTRIMIN) 1 % cream Apply topically 2 times daily 30 g 1     colchicine 0.6 MG tablet Take 1-2 tablets (0.6-1.2 mg) by mouth daily as needed for moderate pain 12 tablet 1     diclofenac (VOLTAREN) 1 % GEL topical gel Apply 4 grams to knees or 2 grams to hands four times daily using enclosed dosing card. 100 g 1     dulaglutide (TRULICITY) 1.5 MG/0.5ML pen Inject 1.5 mg Subcutaneous every 7 days 45 mL 3     DULoxetine (CYMBALTA) 20 MG capsule Take 1 capsule (20 mg) by mouth 2 times daily 60 capsule 0     Efinaconazole 10 % SOLN Externally apply topically daily To toenails. 8 mL 11     gabapentin (NEURONTIN) 300 MG capsule Take 2 capsules (600 mg) by mouth 2 times  daily Needs PCP appt for further refills 120 capsule 0     hydrALAZINE (APRESOLINE) 25 MG tablet Take 1 tab (25 mg) in am, 2 tabs (50 mg) midday, and 3 tabs (75 mg) in pm daily 450 tablet 3     insulin glargine U-300 (TOUJEO) 300 UNIT/ML injection Inject 170 units SQ each am. 30 mL 3     levothyroxine (SYNTHROID/LEVOTHROID) 200 MCG tablet Take 1 tab Monday thru Saturday and 2 tabs on Sundays. 102 tablet 3     metolazone (ZAROXOLYN) 2.5 MG tablet Take 1 tablet (2.5 mg) by mouth 2 times daily Take 1/2 hour prior to torsemide 30 tablet 1     metoprolol succinate (TOPROL-XL) 25 MG 24 hr tablet Take 1 tablet (25 mg) by mouth At Bedtime 90 tablet 3     morphine (MS CONTIN) 15 MG 12 hr tablet Take 15 mg by mouth daily as needed       nicotine ([employer] NICOTINE) 14 MG/24HR patch 2h hr Place 1 patch onto the skin every 24 hours 30 patch 1     NOVOLOG FLEXPEN 100 UNIT/ML soln Inject 65 units with meals plus correction. Pt uses approx 190 units in 24 hrs. 180 mL 3     nystatin (MYCOSTATIN) cream Apply topically 2 times daily To toenails 90 g 6     order for DME Left foot 1 Units 0     order for DME Equipment being ordered: BI [id number] $92   Plantar, fasciitis, LG, night splint 1 each 0     order for DME Equipment being ordered: Challenger Wide walker if available - patient needs seat, basket and brakes. 1 each 0     ORDER FOR DME Use your CPAP device as directed by your provider. Pressure change to min 13 max 18cwp 1 each 99     oxyCODONE-acetaminophen (PERCOCET) 5-325 MG per tablet Take 1 tablet by mouth every 8 hours as needed for moderate to severe pain (try to limit use, no further prescriptions until seen in pain clinic) 60 tablet 0     polyethylene glycol (MIRALAX/GLYCOLAX) powder        potassium chloride SA (POTASSIUM CHLORIDE) 20 MEQ CR tablet Take 2 tablets (40 mEq) by mouth 4 times daily 240 tablet 3     Respiratory Therapy Supplies (NEBULIZER COMPRESSOR) KIT 1 Device 4 times daily as needed. 1 kit 3     senna  (SENOKOT) 8.6 MG tablet Take 1 tablet by mouth 2 times daily 45 tablet 0     spironolactone (ALDACTONE) 50 MG tablet Take 1 tablet (50 mg) by mouth daily 30 tablet 11     tiotropium (SPIRIVA HANDIHALER) 18 MCG inhalation capsule Inhale contents of one capsule daily. 30 capsule 1     topiramate (TOPAMAX) 25 MG tablet 25 mg at bedtime for 1 week, 50 mg at bedtime for 1 week and 75 mg daily at bedtime thereafter 360 tablet 2     torsemide (DEMADEX) 100 MG tablet Take 1 tablet (100 mg) by mouth 2 times daily 180 tablet 1     warfarin (COUMADIN) 5 MG tablet Take one to two tablets daily or as directed by the Coumadin Clinic 90 tablet 3       Problem List:  Patient Active Problem List    Diagnosis Date Noted     Foot pain 04/06/2017     Priority: Medium     Chest pain 02/01/2017     Priority: Medium     Cellulitis 10/14/2016     Priority: Medium     Atrial fibrillation (H) [I48.91] 06/06/2016     Priority: Medium     Long-term (current) use of anticoagulants [Z79.01] 06/06/2016     Priority: Medium     Plantar fasciitis 09/07/2015     Priority: Medium     Neuropathic pain of lower extremity 09/07/2015     Priority: Medium     Peritonsillar abscess 09/30/2014     Priority: Medium     Asthma 07/16/2013     Priority: Medium     Problem list name updated by automated process. Provider to review       Azotemia 04/30/2013     Priority: Medium     SOB (shortness of breath) 03/25/2013     Priority: Medium     Hypothyroidism following radioiodine therapy 03/07/2013     Priority: Medium     JYOTI (obstructive sleep apnea) CPAP Min Pressure of 13 and Max Pressure of 18 03/05/2013     Priority: Medium     3/4/13 PSG - AHI 62, started on APAP 13-18 with FFM       Chronic diastolic heart failure (H) 02/15/2013     Priority: Medium     CORE Pt  EDW is about 437#                 Chronic anticoagulation for a-fib 02/15/2013     Priority: Medium     INR's followed by coumadin clinic at        Morbid obesity (H) 01/30/2013     Priority:  "Medium     Diabetes mellitus, type 2 (H) 01/30/2013     Priority: Medium     Dilated cardiomyopathy (H) 01/08/2013     Priority: Medium     Right Heart Procedure Note:  July 20, 2015  Indication: assessment of the filling pressures   Procedure   Access: 7 Fr sheath without complications in the right IJ  Findings   RA: 9/10/6  RV 30/7  PA 30/15/22  PCWP 16/16/14  [place] CO 4.95 (1.69) TD CO 7.3 (2.5)   TPR 4.45 PVR 1.62  PaSat 59.3 Hb 13.4  Impression   - Normal RV and LV filling pressures.   - Cardiomyopathy       Right Heart Cath: 5/1/13  RHC, acute exacerbation of HF was ruled out yesterday. (RA 11, PA s29 ED12, PA 17 and PCWP 14.) Since last RHC in [place] the Cardiac index ([place]) is up from 1.6 to1.9.  ECHO: 3/26/2013  Interpretation Summary  Moderate left ventricular dilation is present. The Ejection Fraction is   estimated at 40-45%. Pulmonary artery systolic pressure cannot be assessed.   The inferior vena cava is normal. Technically difficult study. Poor acoustic   windows.       Chronic atrial fibrillation (HCC) 01/05/2013     Priority: Medium        Past Medical/Surgical History:  Past Medical History:   Diagnosis Date     A-fib (H) 2011    on coumadin since 1/13     Asthma     as a kid     Chest pain 2/1/2017     Chronic anticoagulation for a-fib 2/15/2013    INR's followed by coumadin clinic at      Diabetes mellitus (H) 2012     Diastolic heart failure 2/15/2013     Dilated cardiomyopathy (H) 1/8/2013     HTN (hypertension)      Hyperthyroidism     [place], s/p I131 1/13, now on prednisone and methimazole     Morbid obesity (H)      Pulmonary embolism (H) 1/12    hospitalized in [place], on lovenox/coumadin for a few months but stopped, hypercoag w/u neg per pt     Sleep apnea     using CPAP     No past surgical history on file.        Physical Examination:  Vitals: /78  Pulse 88  Resp 20  Ht 1.702 m (5' 7\")  Wt (!) 210.5 kg (464 lb)  SpO2 97%  BMI 72.67 kg/m2  BMI= Body mass index is 72.67 " kg/(m^2).         [place] Total Score 3/20/2018   Total score - [place] 13       GENERAL APPEARANCE: healthy, alert and no distress      A/P: excessive time in bed on cpap with increased efforts to  inadvertent napping while watching tv, excessive fragmentation with response to 6 pm lasix, often till [id number],       1. JYOTI: mask fit-torn cushion again, will check synopsis in 1 wk, if leak better will increase pressures and let [place] know when done, remove hose from mask with bathroom use, see time zone suggested below  2. Obesity: limit time in bed to enchance sleep quality, suggest after 10pm till 9A.  Activity level should increase with increase in time out of bed.  Has other places to watch tv, relax.  Suggested multitask to avoid dozing with relaxation outside of bed. Continues to work with nutrition and cardiology.  3. Sleep habits: too much time in bed, on cpap, without intentions of sleeping. See # 2 above for sleep window.      Time spent with patient is 25 minutes, of which >50% was spent in counseling, education and coordination of care.

## 2018-07-19 NOTE — PROGRESS NOTES
Patient was seen with me here in [place] after she saw Alecia Ramirez CNP. Patient stated that before she had her humidifier on but turned it off and do not remember how to turn it back on. I reeducated her on how to set the humidifier and she ask that I leave it on Auto mode. Patient also said the last 2 or 3 full face cushion ([employer] Simplus full face mask) she got had ripped along the silicone and plastic piece. So she waits until she can get a new one and or she has been taping it. I let her know to call us next time it rips. I took my medium cushion and large cushion and placed it on her face. Medium is a good fit got her and large cushion is too big. I let her know since she just got new mask today for her to call me if any issues with cushion ripping.

## 2018-07-19 NOTE — MR AVS SNAPSHOT
After Visit Summary   7/19/2018    Pricilla Brown    MRN: [id number]           Patient Information     Date Of Birth          1975        Visit Information        Provider Department      7/19/2018 2:00 PM Alecia Ramirez APRN [facility], [place], Sleep Study        Care Instructions    Try to remove hose from mask with bathroom use    Enter bed between 10-11 to 9Am    Get a couple of naps, brief on a timer    Out of bed but relaxing : multitask          Follow-ups after your visit        Follow-up notes from your care team     Return for pt instructions, follow up in 3 months.      Your next 10 appointments already scheduled     Jul 24, 2018  9:30 AM CDT   Pool Evaluation with Chantal Chavis, PT   [facility] ([facility])    [address]  [facility] [address]   [phone]            Jul 25, 2018  2:00 PM CDT   (Arrive by 1:45 PM)   CORE RETURN with Ximena Carpenter NP   [facility] ([facility] [place])    [address] [place] [address]   [phone]            Jul 27, 2018 10:45 AM CDT   (Arrive by 10:30 AM)   Return Weight Management Visit with SHANE Murcia [facility] ([facility] [place])    [address] [place] [address]  [address] [facility] [address]   [phone]            Aug 02, 2018 11:30 AM CDT   (Arrive by 11:15 AM)   NUTRITION VISIT with Paulette Ruiz RD   [facility] ([facility] [place])    [address] [place] [address]  [address]th [facility] [address]   [phone]            Aug 03, 2018  3:00 PM CDT   (Arrive by 2:45 PM)   RETURN DIABETES with SHANE Rojas [facility] Endocrinology ([facility] [place])    [address] [place] [address]  [address]rd [facility] [address]   [phone]            Aug 08, 2018  8:30 AM CDT    (Arrive by 8:15 AM)   NEW NEURO with Vladislav Samuel MD   [facility] ([facility] [place])    [address] [facility] [address]  4th Floor  [facility] [address] [phone]            Aug 20, 2018  1:00 PM CDT   Return Visit with Norman Jean DPM   [facility] ([facility])    [address] [facility] [address]   [phone]            Sep 25, 2018 11:30 AM CDT   (Arrive by 11:15 AM)   Return Visit with Silva Damon MD   [facility] Weight Management ([facility] [place])    [address] [facility] [address]  4th Floor  [facility] [address] [phone]            Oct 18, 2018  2:00 PM CDT   Return Sleep Patient with YOCASTA Horan CNP   [facility], [place], Sleep Study ([facility])    [address] [facility] [id number] [phone]              Who to contact     If you have questions or need follow up information about today's clinic visit or your schedule please contact [facility] directly at [phone].  Normal or non-critical lab and imaging results will be communicated to you by [employer], letter or phone within 4 business days after the clinic has received the results. If you do not hear from us within 7 days, please contact the clinic through MyChart or phone. If you have a critical or abnormal lab result, we will notify you by phone as soon as possible.  Submit refill requests through [employer] or call your pharmacy and they will forward the refill request to us. Please allow 3 business days for your refill to be completed.          Additional Information About Your Visit        [employer] Information     [employer] gives you secure access to your electronic health record. If you see a primary care provider, you can also send messages to your care team and make appointments. If you have questions, please call your primary care  "clinic.  If you do not have a primary care provider, please call [phone] and they will assist you.        Care EveryWhere ID     This is your Care EveryWhere ID. This could be used by other organizations to access your [place] medical records  [id number]        Your Vitals Were     Pulse Respirations Height Pulse Oximetry BMI (Body Mass Index)       88 20 1.702 m (5' 7\") 97% 72.67 kg/m2        Blood Pressure from Last 3 Encounters:   07/19/18 119/78   06/19/18 141/86   06/13/18 104/51    Weight from Last 3 Encounters:   07/19/18 (!) 210.5 kg (464 lb)   06/19/18 (!) 213.7 kg (471 lb 3.2 oz)   06/13/18 (!) 206.2 kg (454 lb 8 oz)              Today, you had the following     No orders found for display       Primary Care Provider Office Phone # Fax #    Jamilah Bernal [phone]       [address] [address]        Equal Access to Services     : Hadii miki bower Sorosmery, waaxda luqadaha, qaybta kaalmada alyssa, bola hurt . So [facility] [phone].    ATENCIÓN: Si habla español, tiene a de la rosa disposición servicios gratuitos de asistencia lingüística. [facility] [phone].    We comply with applicable federal civil rights laws and [place] laws. We do not discriminate on the basis of race, color, national origin, age, disability, sex, sexual orientation, or gender identity.            Thank you!     Thank you for choosing [facility], SLEEP STUDY  for your care. Our goal is always to provide you with excellent care. Hearing back from our patients is one way we can continue to improve our services. Please take a few minutes to complete the written survey that you may receive in the mail after your visit with us. Thank you!             Your Updated Medication List - Protect others around you: Learn how to safely use, store and throw away your medicines at www.disposemymeds.org.          This list is accurate as of " 7/19/18  2:55 PM.  Always use your most recent med list.                   Brand Name Dispense Instructions for use Diagnosis    acetaminophen 325 MG tablet    TYLENOL    180 tablet    Take 2 tablets (650 mg) by mouth 3 times daily as needed for mild pain or fever (total acetaminophen dose should not exceed 3000 mg per day)    Neuropathic pain of lower extremity, unspecified laterality       albuterol 108 (90 Base) MCG/ACT Inhaler    PROAIR HFA/PROVENTIL HFA/VENTOLIN HFA    1 Inhaler    Inhale 2 puffs into the lungs every 6 hours as needed.    Reactive airway disease       bisacodyl 10 MG Suppository    DULCOLAX    6 suppository    Place 1 suppository (10 mg) rectally daily as needed for constipation    Constipation       blood glucose lancets standard    no brand specified    200 each    Use to test blood sugar 5 times daily or as directed.    Diabetes mellitus, type 2 (H)       blood glucose monitoring test strip    no brand specified    100 strip    Use to test blood sugars 3 times daily or as directed    Diabetes mellitus, type 2 (H)       buPROPion 100 MG 12 hr tablet    WELLBUTRIN SR    180 tablet    Take 1 tablet (100 mg) by mouth 2 times daily    Tobacco abuse       capsaicin 0.025 % Crea cream    ZOSTRIX    1 Tube    Apply 1 g topically 3 times daily as needed    Dermatophytosis of nail       ciclopirox 8 % Soln     1 Bottle    Externally apply topically daily To toenails.    Dermatophytosis of nail       clotrimazole 1 % cream    LOTRIMIN    30 g    Apply topically 2 times daily    Tinea pedis of right foot       colchicine 0.6 MG tablet    COLCYRS    12 tablet    Take 1-2 tablets (0.6-1.2 mg) by mouth daily as needed for moderate pain    Acute gouty arthritis       diclofenac 1 % Gel topical gel    VOLTAREN    100 g    Apply 4 grams to knees or 2 grams to hands four times daily using enclosed dosing card.    Left foot pain       dulaglutide 1.5 MG/0.5ML pen    TRULICITY    45 mL    Inject 1.5 mg  Subcutaneous every 7 days    Type 2 diabetes mellitus with hyperglycemia, with long-term current use of insulin (H)       DULoxetine 20 MG EC capsule    CYMBALTA    60 capsule    Take 1 capsule (20 mg) by mouth 2 times daily    Depression       Efinaconazole 10 % Soln     8 mL    Externally apply topically daily To toenails.    Dermatophytosis of nail       gabapentin 300 MG capsule    NEURONTIN    120 capsule    Take 2 capsules (600 mg) by mouth 2 times daily Needs PCP appt for further refills    Type 2 diabetes mellitus with hyperglycemia, with long-term current use of insulin (H)       hydrALAZINE 25 MG tablet    APRESOLINE    450 tablet    Take 1 tab (25 mg) in am, 2 tabs (50 mg) midday, and 3 tabs (75 mg) in pm daily    Dilated cardiomyopathy (H)       * iBG star w/Device Kit     1 kit    -PLEASE GIVE PATIENT A DEVICE HER INSURANCE WILL COVER-    Type 2 diabetes mellitus with hyperglycemia, with long-term current use of insulin (H)       * blood glucose monitoring meter device kit    no brand specified    1 kit    Use to test blood sugar 3 times daily or as directed.    Diabetes mellitus, type 2 (H)       insulin glargine U-300 300 UNIT/ML injection    TOUJEO    30 mL    Inject 170 units SQ each am.    Type 2 diabetes mellitus with hyperglycemia, with long-term current use of insulin (H)       levothyroxine 200 MCG tablet    SYNTHROID/LEVOTHROID    102 tablet    Take 1 tab Monday thru Saturday and 2 tabs on Sundays.    Other specified hypothyroidism       metolazone 2.5 MG tablet    ZAROXOLYN    30 tablet    Take 1 tablet (2.5 mg) by mouth 2 times daily Take 1/2 hour prior to torsemide    Acute on chronic systolic heart failure (H)       metoprolol succinate 25 MG 24 hr tablet    TOPROL-XL    90 tablet    Take 1 tablet (25 mg) by mouth At Bedtime    Chronic diastolic heart failure (H)       morphine 15 MG 12 hr tablet    MS CONTIN     Take 15 mg by mouth daily as needed        Nebulizer Compressor Kit     1 kit     1 Device 4 times daily as needed.    COPD (chronic obstructive pulmonary disease) (H)       nicotine 14 MG/24HR 24 hr patch    [employer] NICOTINE    30 patch    Place 1 patch onto the skin every 24 hours    Tobacco abuse       NovoLOG FLEXPEN 100 UNIT/ML injection   Generic drug:  insulin aspart     180 mL    Inject 65 units with meals plus correction. Pt uses approx 190 units in 24 hrs.    Type 2 diabetes mellitus with hyperglycemia, with long-term current use of insulin (H)       nystatin cream    MYCOSTATIN    90 g    Apply topically 2 times daily To toenails    Dermatophytosis of nail       order for DME     1 each    Use your CPAP device as directed by your provider. Pressure change to min 13 max 18cwp        * order for DME     1 each    Equipment being ordered: Challenger Wide walker if available - patient needs seat, basket and brakes.    Dilated cardiomyopathy (H), Chronic systolic heart failure (H)       * order for DME     1 each    Equipment being ordered: PISTIS Consult 3982-9147 $92  Plantar, fasciitis, LG, night splint    Dermatophytosis of nail, Plantar fasciitis of right foot, Diabetic neuropathy with neurologic complication (H), Peroneal tendinitis, right       * order for DME     1 Units    Left foot    Left foot pain, Diabetic neuropathy with neurologic complication (H)       oxyCODONE-acetaminophen 5-325 MG per tablet    PERCOCET    60 tablet    Take 1 tablet by mouth every 8 hours as needed for moderate to severe pain (try to limit use, no further prescriptions until seen in pain clinic)    Lumbago, Bilateral low back pain with sciatica, sciatica laterality unspecified       polyethylene glycol powder    MIRALAX/GLYCOLAX          potassium chloride SA 20 MEQ CR tablet    KLOR-CON    240 tablet    Take 2 tablets (40 mEq) by mouth 4 times daily    Hypokalemia       senna 8.6 MG tablet    SENOKOT    45 tablet    Take 1 tablet by mouth 2 times daily    Constipation       spironolactone 50 MG tablet     ALDACTONE    30 tablet    Take 1 tablet (50 mg) by mouth daily        tiotropium 18 MCG capsule    SPIRIVA HANDIHALER    30 capsule    Inhale contents of one capsule daily.    COPD (chronic obstructive pulmonary disease) (H)       topiramate 25 MG tablet    TOPAMAX    360 tablet    25 mg at bedtime for 1 week, 50 mg at bedtime for 1 week and 75 mg daily at bedtime thereafter    Morbid obesity (H)       torsemide 100 MG tablet    DEMADEX    180 tablet    Take 1 tablet (100 mg) by mouth 2 times daily    Chronic diastolic heart failure (H)       warfarin 5 MG tablet    COUMADIN    90 tablet    Take one to two tablets daily or as directed by the Coumadin Clinic    Atrial fibrillation, unspecified type (H), Long-term (current) use of anticoagulants       * Notice:  This list has 5 medication(s) that are the same as other medications prescribed for you. Read the directions carefully, and ask your doctor or other care provider to review them with you.

## 2018-07-20 ENCOUNTER — PRE VISIT (OUTPATIENT)
Dept: CARDIOLOGY | Facility: CLINIC | Age: 43
End: 2018-07-20

## 2018-07-20 DIAGNOSIS — I50.32 CHRONIC DIASTOLIC HEART FAILURE (H): Primary | ICD-10-CM

## 2018-07-25 ENCOUNTER — ANTICOAGULATION THERAPY VISIT (OUTPATIENT)
Dept: ANTICOAGULATION | Facility: CLINIC | Age: 43
End: 2018-07-25

## 2018-07-25 ENCOUNTER — OFFICE VISIT (OUTPATIENT)
Dept: CARDIOLOGY | Facility: CLINIC | Age: 43
End: 2018-07-25
Attending: NURSE PRACTITIONER
Payer: MEDICARE

## 2018-07-25 VITALS
WEIGHT: 293 LBS | BODY MASS INDEX: 45.99 KG/M2 | OXYGEN SATURATION: 98 % | SYSTOLIC BLOOD PRESSURE: 142 MMHG | HEART RATE: 94 BPM | DIASTOLIC BLOOD PRESSURE: 79 MMHG | HEIGHT: 67 IN

## 2018-07-25 DIAGNOSIS — E87.6 HYPOKALEMIA: ICD-10-CM

## 2018-07-25 DIAGNOSIS — I50.32 CHRONIC DIASTOLIC HEART FAILURE (H): ICD-10-CM

## 2018-07-25 DIAGNOSIS — I50.23 ACUTE ON CHRONIC SYSTOLIC HEART FAILURE (H): ICD-10-CM

## 2018-07-25 DIAGNOSIS — Z79.01 LONG-TERM (CURRENT) USE OF ANTICOAGULANTS: ICD-10-CM

## 2018-07-25 DIAGNOSIS — I48.91 ATRIAL FIBRILLATION, UNSPECIFIED TYPE (H): ICD-10-CM

## 2018-07-25 DIAGNOSIS — I48.91 ATRIAL FIBRILLATION (H): ICD-10-CM

## 2018-07-25 LAB
ANION GAP SERPL CALCULATED.3IONS-SCNC: 8 MMOL/L (ref 3–14)
BUN SERPL-MCNC: 14 MG/DL (ref 7–30)
CALCIUM SERPL-MCNC: 8.3 MG/DL (ref 8.5–10.1)
CHLORIDE SERPL-SCNC: 105 MMOL/L (ref 94–109)
CO2 SERPL-SCNC: 24 MMOL/L (ref 20–32)
CREAT SERPL-MCNC: 0.96 MG/DL (ref 0.52–1.04)
GFR SERPL CREATININE-BSD FRML MDRD: 63 ML/MIN/1.7M2
GLUCOSE SERPL-MCNC: 218 MG/DL (ref 70–99)
INR PPP: 3.17 (ref 0.86–1.14)
POTASSIUM SERPL-SCNC: 3.9 MMOL/L (ref 3.4–5.3)
SODIUM SERPL-SCNC: 136 MMOL/L (ref 133–144)

## 2018-07-25 PROCEDURE — 99214 OFFICE O/P EST MOD 30 MIN: CPT | Mod: ZP | Performed by: NURSE PRACTITIONER

## 2018-07-25 PROCEDURE — 80048 BASIC METABOLIC PNL TOTAL CA: CPT | Performed by: INTERNAL MEDICINE

## 2018-07-25 PROCEDURE — 85610 PROTHROMBIN TIME: CPT | Performed by: INTERNAL MEDICINE

## 2018-07-25 PROCEDURE — G0463 HOSPITAL OUTPT CLINIC VISIT: HCPCS | Mod: ZF

## 2018-07-25 PROCEDURE — 36415 COLL VENOUS BLD VENIPUNCTURE: CPT | Performed by: INTERNAL MEDICINE

## 2018-07-25 RX ORDER — POTASSIUM CHLORIDE 1500 MG/1
TABLET, EXTENDED RELEASE ORAL
Qty: 240 TABLET | Refills: 11 | Status: SHIPPED | OUTPATIENT
Start: 2018-07-25 | End: 2018-12-30

## 2018-07-25 RX ORDER — METOPROLOL SUCCINATE 50 MG/1
50 TABLET, EXTENDED RELEASE ORAL AT BEDTIME
Qty: 30 TABLET | Refills: 3 | Status: SHIPPED | OUTPATIENT
Start: 2018-07-25 | End: 2018-09-11

## 2018-07-25 ASSESSMENT — PAIN SCALES - GENERAL: PAINLEVEL: NO PAIN (0)

## 2018-07-25 NOTE — PROGRESS NOTES
HPI:    is a 43 yr old female patient followed by Dr. Alcala for dilated cardiomyopathy, presents for follow up to [facility]. She was seen 1 week ago in [facility] and at that time, her atenolol was restarted due to rapid rate Afib. She returns today for follow up. She was last seen in clinic over a month ago when metolazone was added. She contacted the clinic shortly after her visit reporting worsening congestive symptoms. She was scheduled to return to clinic on 2 occasions. Ultimatetly, she was unable to arrange transportation and was feeling better. She presents for follow up.    Pricilla ran out of metolazone last week-- since then, feeling more congested and SOB plus some chest heaviness. Also noted more palpitations this week. Sitting upright this past week. Using CPAP. Noted more snoring and apneic episodes despite  CPAP last several nights. Ankle edema and bloating. Appetite is okay. Endorses some early satiety this past week.    PAST MEDICAL HISTORY:  Past Medical History:   Diagnosis Date     A-fib (H) 2011    on coumadin since 1/13     Asthma     as a kid     Chest pain 2/1/2017     Chronic anticoagulation for a-fib 2/15/2013    INR's followed by coumadin clinic at      Diabetes mellitus (H) 2012     Diastolic heart failure 2/15/2013     Dilated cardiomyopathy (H) 1/8/2013     HTN (hypertension)      Hyperthyroidism     Graves, s/p I131 1/13, now on prednisone and methimazole     Morbid obesity (H)      Pulmonary embolism (H) 1/12    hospitalized in [place], on lovenox/coumadin for a few months but stopped, hypercoag w/u neg per pt     Sleep apnea     using CPAP       FAMILY HISTORY:  Family History   Problem Relation Age of Onset     Thyroid Disease Mother      Grave's D     Diabetes Mother      HEART DISEASE Mother      Cerebrovascular Disease Mother      dec. 56 yo     Hypertension Mother      HEART DISEASE Sister      Heart Murmur     Diabetes Sister      HEART DISEASE Father      dec in his 30s, MI      Psychotic Disorder Brother      Bipolar     Diabetes Brother      Thyroid Disease Brother      Hyper Thyroid     HEART DISEASE Brother      Thyroid Disease Sister      Hyper Thyroid     Thyroid Disease Sister      Hyper Thyroid     Thyroid Disease Sister      Mental Health Problems     Thyroid Disease Maternal Aunt      Thyroid Disease Maternal Uncle      Cancer No family hx of      Glaucoma No family hx of      Macular Degeneration No family hx of        SOCIAL HISTORY:  Social History     Social History     Marital status: Single     Spouse name: N/A     Number of children: N/A     Years of education: N/A     Social History Main Topics     Smoking status: Light Tobacco Smoker     Packs/day: 0.25     Years: 13.00     Types: Cigarettes     Smokeless tobacco: Never Used      Comment: down to 5 cigs     Alcohol use No     Drug use: No     Sexual activity: Yes     Partners: Male     Birth control/ protection: Condom     Other Topics Concern     None     Social History Narrative    Single, no children        Gyn:        Last pap several years ago, no abnormal    No STIs            Patient is single.  She is no longer working.  She used to work in the area of customer service.  She is currently living with her sister in [place], [place].  She has no pets.  Patient has smoked a half pack of cigarettes a day for the past 10 plus years.  She states that she is down to 5 cigarettes a day with the aid of Wellbutrin.  She does not smoke cigars, pipes or chew tobacco.  She has 1 cup of coffee in the morning.  She does not drink alcohol.  Patient does not exercise.        CURRENT MEDICATIONS:  Current Outpatient Prescriptions on File Prior to Visit:  acetaminophen (TYLENOL) 325 MG tablet Take 2 tablets (650 mg) by mouth 3 times daily as needed for mild pain or fever (total acetaminophen dose should not exceed 3000 mg per day)   albuterol (PROAIR HFA, PROVENTIL HFA, VENTOLIN HFA) 108 (90 BASE) MCG/ACT inhaler Inhale 2  puffs into the lungs every 6 hours as needed.   bisacodyl (DULCOLAX) 10 MG suppository Place 1 suppository (10 mg) rectally daily as needed for constipation   blood glucose (NO BRAND SPECIFIED) lancets standard Use to test blood sugar 5 times daily or as directed.   blood glucose monitoring (NO BRAND SPECIFIED) meter device kit Use to test blood sugar 3 times daily or as directed.   blood glucose monitoring (NO BRAND SPECIFIED) test strip Use to test blood sugars 3 times daily or as directed   Blood Glucose Monitoring Suppl (IBG STAR) W/DEVICE KIT -PLEASE GIVE PATIENT A DEVICE HER INSURANCE WILL COVER-   buPROPion (WELLBUTRIN SR) 100 MG 12 hr tablet Take 1 tablet (100 mg) by mouth 2 times daily   capsaicin (ZOSTRIX) 0.025 % CREA Apply 1 g topically 3 times daily as needed   ciclopirox 8 % SOLN Externally apply topically daily To toenails.   clotrimazole (LOTRIMIN) 1 % cream Apply topically 2 times daily   colchicine 0.6 MG tablet Take 1-2 tablets (0.6-1.2 mg) by mouth daily as needed for moderate pain   diclofenac (VOLTAREN) 1 % GEL topical gel Apply 4 grams to knees or 2 grams to hands four times daily using enclosed dosing card.   dulaglutide (TRULICITY) 1.5 MG/0.5ML pen Inject 1.5 mg Subcutaneous every 7 days   DULoxetine (CYMBALTA) 20 MG capsule Take 1 capsule (20 mg) by mouth 2 times daily   Efinaconazole 10 % SOLN Externally apply topically daily To toenails.   gabapentin (NEURONTIN) 300 MG capsule Take 2 capsules (600 mg) by mouth 2 times daily Needs PCP appt for further refills   hydrALAZINE (APRESOLINE) 25 MG tablet Take 1 tab (25 mg) in am, 2 tabs (50 mg) midday, and 3 tabs (75 mg) in pm daily   insulin glargine U-300 (TOUJEO) 300 UNIT/ML injection Inject 170 units SQ each am.   levothyroxine (SYNTHROID/LEVOTHROID) 200 MCG tablet Take 1 tab Monday thru Saturday and 2 tabs on Sundays.   metolazone (ZAROXOLYN) 2.5 MG tablet Take 1 tablet (2.5 mg) by mouth 2 times daily Take 1/2 hour prior to torsemide    metoprolol succinate (TOPROL-XL) 25 MG 24 hr tablet Take 1 tablet (25 mg) by mouth At Bedtime   morphine (MS CONTIN) 15 MG 12 hr tablet Take 15 mg by mouth daily as needed   NOVOLOG FLEXPEN 100 UNIT/ML soln Inject 65 units with meals plus correction. Pt uses approx 190 units in 24 hrs.   nystatin (MYCOSTATIN) cream Apply topically 2 times daily To toenails   order for DME Left foot   order for DME Equipment being ordered: Challenger Wide walker if available - patient needs seat, basket and brakes.   ORDER FOR DME Use your CPAP device as directed by your provider. Pressure change to min 13 max 18cwp   oxyCODONE-acetaminophen (PERCOCET) 5-325 MG per tablet Take 1 tablet by mouth every 8 hours as needed for moderate to severe pain (try to limit use, no further prescriptions until seen in pain clinic)   polyethylene glycol (MIRALAX/GLYCOLAX) powder    potassium chloride SA (POTASSIUM CHLORIDE) 20 MEQ CR tablet Take 2 tablets (40 mEq) by mouth 4 times daily   senna (SENOKOT) 8.6 MG tablet Take 1 tablet by mouth 2 times daily   spironolactone (ALDACTONE) 50 MG tablet Take 1 tablet (50 mg) by mouth daily   topiramate (TOPAMAX) 25 MG tablet 25 mg at bedtime for 1 week, 50 mg at bedtime for 1 week and 75 mg daily at bedtime thereafter   torsemide (DEMADEX) 100 MG tablet Take 1 tablet (100 mg) by mouth 2 times daily   warfarin (COUMADIN) 5 MG tablet Take one to two tablets daily or as directed by the Coumadin Clinic   nicotine ([employer] NICOTINE) 14 MG/24HR patch 2h hr Place 1 patch onto the skin every 24 hours (Patient not taking: Reported on 7/25/2018)   order for DME Equipment being ordered:  4783-9758 $92 [id number], fasciitis, LG, night splint   Respiratory Therapy Supplies (NEBULIZER COMPRESSOR) KIT 1 Device 4 times daily as needed. (Patient not taking: Reported on 7/25/2018)   tiotropium (SPIRIVA HANDIHALER) 18 MCG inhalation capsule Inhale contents of one capsule daily. (Patient not taking: Reported on 7/25/2018)  "    No current facility-administered medications on file prior to visit.       ROS:   Constitutional: No fever, chills, or sweats. No weight gain/loss.   ENT: No visual disturbance, ear ache, epistaxis, sore throat.   Allergies/Immunologic: Negative.   Respiratory: No cough, hemoptysis.   Cardiovascular: As per HPI.   GI: No nausea, vomiting, hematemesis, melena, or hematochezia.   : No urinary frequency, dysuria, or hematuria.   Integument: Negative.   Psychiatric: Negative.   Neuro: Negative.   Endocrinology: Negative.   Musculoskeletal: Negative.    EXAM:  /79 (BP Location: Left arm, Patient Position: Chair, Cuff Size: Adult Regular)  Pulse 94  Ht 1.702 m (5' 7\")  Wt (!) 212.4 kg (468 lb 3.2 oz)  SpO2 98%  BMI 73.33 kg/m2  General: Obese female;  appears comfortable, alert and articulate; She gets SOB with getting on exam table  Eyes: anicteric sclera, EOMI  Neck: no adenopathy  Orophyarynx: moist mucosa, no lesions, dentition intact  Heart: regular, S1/S2, no murmur, gallop, rub, estimated JVP 8cm  Lungs: clear, no rales or wheezing  Abdomen: obese; soft, non-tender, bowel sounds present, no hepatomegaly  Extremities: no clubbing, cyanosis or edema  Neurological: normal speech and affect, no gross motor deficits    Labs:  CMP RESULTS:  Lab Results   Component Value Date     07/25/2018    POTASSIUM 3.9 07/25/2018    CHLORIDE 105 07/25/2018    CO2 24 07/25/2018    ANIONGAP 8 07/25/2018     (H) 07/25/2018    BUN 14 07/25/2018    CR 0.96 07/25/2018    GFRESTIMATED 63 07/25/2018    GFRESTBLACK 76 07/25/2018    JUSTIN 8.3 (L) 07/25/2018    BILITOTAL 0.4 05/29/2018    ALBUMIN 3.6 05/29/2018    ALKPHOS 85 05/29/2018    ALT 20 05/29/2018    AST 12 05/29/2018      Zio Monitor (4/18)  Predominant rhythm is sinus rhythm.   1 run of VT lasting 6 beats at a rate of 200 bpm  AF burdeno f 7%  AF with RVR episode associated with patient symptoms    RHC (2/2017)  RA 13  PA 32/17, 23  PAW 25, 25, 20  CO " Catarina: 7.3 (2.6)    Assessment and Plan:   Pricilla is a pleasant 43-year-old woman with NICM and systolic heart failure. She remains volume up. She will resume metolazone twice daily. Her blood pressure is not adequately controlled. She will increase her hydralazine morning dose to 50 mg, continuing midday and evening doses at 50 and 75 mg, respectively.  She will increase Toprol to 50 mg daily.  Pricilla was encouraged to eat a potassium rich diet given the increase of her diuretics and will have labs repeated in 1 week.  Return to clinic in 1 month or as needed.      1. Chronic systolic heart failure secondary to NICM.    Stage C  NYHA Class IIIa  ACEi/ARB yes  BB no - Toprol increasing today to 50 mg daily.  Aldosterone antagonist yes  SCD prophylaxis does not meet criteria for implant  % BiV pacing: N/A  Fluid status  hypervoliemic - resume BID dosing of metolazone    2. AFib: - Increasing Toprol today. Anticoagulated    3. Ckd: suspect due to diabetic nephropathy - Repeat labs Monday    5. Tobacco use: Continues smoking. Encouraged to quit and support offered.    6. HTN; suboptimal control -- increasing Toprol and Hydralazine today    7. Morbid obesity - unchanged    8. DM: managed by PCP. Would consider addition of Jardiance given positive cardiovascular impact    9. Hypothyroidism: on Synthroid - has had radiation therapy.       30 minutes spent in direct care, >50% in counseling      CC  Patient Care Team:  Jamilah Bernal MD as PCP - General (Internal Medicine)  Percy Alcala MD as MD (Cardiology)  Silva Damon MD as MD (Internal Medicine)  Norman Jean DPM (Podiatry)  Zita Liao as Registered Nurse (Diabetes Education)  Steph Kim PA-C as Physician Assistant (Physician Assistant)  Delmi Orellana MD as MD (Ophthalmology)  Nelly Miranda MD as MD (Ophthalmology)  Drea Rosas, RN as Clinic Care Coordinator (Bariatric)  Isela Archibald,  SHANE as Physician Assistant (Physician Assistant)  Tom Guardado MD as MD (Ophthalmology)  Russel Veragra OD as MD (Optometry)  Ophelia Kenny, RN as Nurse Coordinator (Cardiology)  Kristen Cuello APRN CNP as Nurse Practitioner (Cardiology)  Ximena Carpenter NP as Nurse Practitioner (Cardiology)  Ximena Carpenter NP as Nurse Practitioner (Nurse Practitioner - Family)  Vladislav Samuel MD as MD (Ophthalmology)  SELF, REFERRED

## 2018-07-25 NOTE — PROGRESS NOTES
ANTICOAGULATION FOLLOW-UP CLINIC VISIT    Patient Name:  Pricilla Brown  Date:  7/25/2018  Contact Type:  Telephone    SUBJECTIVE:     Patient Findings     Positives No Problem Findings           OBJECTIVE    INR   Date Value Ref Range Status   07/25/2018 3.17 (H) 0.86 - 1.14 Final     Chromogenic Factor 10   Date Value Ref Range Status   04/08/2017 19 (L) 70 - 130 % Final     Comment:     Therapeutic Range:  A Chromogenic Factor 10 level of approximately 20-40%   inversely correlates with an INR of 2-3 for patients receiving Warfarin.   Chromogenic Factor 10 levels below 20% indicate an INR greater than 3 and   levels above 40% indicate an INR less than 2.         ASSESSMENT / PLAN  INR assessment SUPRA    Recheck INR In: 1 WEEK    INR Location Clinic      Anticoagulation Summary as of 7/25/2018     INR goal 2.0-2.5   Today's INR 3.17!   Warfarin maintenance plan No maintenance plan   Full warfarin instructions 7/25: 5 mg; 7/26: 15 mg; 7/27: 20 mg; 7/28: 15 mg; 7/29: 15 mg; 7/30: 20 mg; 7/31: 15 mg   Weekly warfarin total 105 mg   Plan last modified Viri Escobedo (7/25/2018)   Next INR check 8/1/2018   Priority INR   Target end date Indefinite    Indications   Atrial fibrillation (H) [I48.91] [I48.91]  Long-term (current) use of anticoagulants [Z79.01] [Z79.01]         Anticoagulation Episode Summary     INR check location Clinic Lab    Preferred lab     Send INR reminders to [facility]    Comments Contact Patient at [phone]      Anticoagulation Care Providers     Provider Role Specialty Phone number    Percy Alcala MD  Cardiology [phone]            See the Encounter Report to view Anticoagulation Flowsheet and Dosing Calendar (Go to Encounters tab in chart review, and find the Anticoagulation Therapy Visit)    Spoke with patient. Gave them their lab results and new warfarin recommendation.  No changes in health, medication, or diet. No missed doses, no falls. No signs or symptoms  of bleed or clotting.    Viri Escobedo

## 2018-07-25 NOTE — LETTER
7/25/2018      RE: Pricilla Brown  [address]  [facility] [address]       Dear Colleague,    Thank you for the opportunity to participate in the care of your patient, Pricilla Brown, at the [facility] [place] [facility]. Please see a copy of my visit note below.    HPI:    is a 43 yr old female patient followed by Dr. Alcala for dilated cardiomyopathy, presents for follow up to [facility] [place]. She was seen 1 week ago in [facility] and at that time, her atenolol was restarted due to rapid rate Afib. She returns today for follow up. She was last seen in clinic over a month ago when metolazone was added. She contacted the clinic shortly after her visit reporting worsening congestive symptoms. She was scheduled to return to clinic on 2 occasions. Ultimatetly, she was unable to arrange transportation and was feeling better. She presents for follow up.    Pricilla ran out of metolazone last week-- since then, feeling more congested and SOB plus some chest heaviness. Also noted more palpitations this week. Sitting upright this past week. Using CPAP. Noted more snoring and apneic episodes despite  CPAP last several nights. Ankle edema and bloating. Appetite is okay. Endorses some early satiety this past week.    PAST MEDICAL HISTORY:  Past Medical History:   Diagnosis Date     A-fib (H) 2011    on coumadin since 1/13     Asthma     as a kid     Chest pain 2/1/2017     Chronic anticoagulation for a-fib 2/15/2013    INR's followed by coumadin clinic at      Diabetes mellitus (H) 2012     Diastolic heart failure 2/15/2013     Dilated cardiomyopathy (H) 1/8/2013     HTN (hypertension)      Hyperthyroidism     Graves, s/p I131 1/13, now on prednisone and methimazole     Morbid obesity (H)      Pulmonary embolism (H) 1/12    hospitalized in [place], on lovenox/coumadin for a few months but stopped, hypercoag w/u neg per pt     Sleep apnea     using CPAP       FAMILY HISTORY:  Family History    Problem Relation Age of Onset     Thyroid Disease Mother      Grave's D     Diabetes Mother      HEART DISEASE Mother      Cerebrovascular Disease Mother      dec. 56 yo     Hypertension Mother      HEART DISEASE Sister      Heart Murmur     Diabetes Sister      HEART DISEASE Father      dec in his 30s, MI     Psychotic Disorder Brother      Bipolar     Diabetes Brother      Thyroid Disease Brother      Hyper Thyroid     HEART DISEASE Brother      Thyroid Disease Sister      Hyper Thyroid     Thyroid Disease Sister      Hyper Thyroid     Thyroid Disease Sister      Mental Health Problems     Thyroid Disease Maternal Aunt      Thyroid Disease Maternal Uncle      Cancer No family hx of      Glaucoma No family hx of      Macular Degeneration No family hx of        SOCIAL HISTORY:  Social History     Social History     Marital status: Single     Spouse name: N/A     Number of children: N/A     Years of education: N/A     Social History Main Topics     Smoking status: Light Tobacco Smoker     Packs/day: 0.25     Years: 13.00     Types: Cigarettes     Smokeless tobacco: Never Used      Comment: down to 5 cigs     Alcohol use No     Drug use: No     Sexual activity: Yes     Partners: Male     Birth control/ protection: Condom     Other Topics Concern     None     Social History Narrative    Single, no children        Gyn:        Last pap several years ago, no abnormal    No STIs            Patient is single.  She is no longer working.  She used to work in the area of customer service.  She is currently living with her sister in [place], [place].  She has no pets.  Patient has smoked a half pack of cigarettes a day for the past 10 plus years.  She states that she is down to 5 cigarettes a day with the aid of Wellbutrin.  She does not smoke cigars, pipes or chew tobacco.  She has 1 cup of coffee in the morning.  She does not drink alcohol.  Patient does not exercise.        CURRENT MEDICATIONS:  Current  Outpatient Prescriptions on File Prior to Visit:  acetaminophen (TYLENOL) 325 MG tablet Take 2 tablets (650 mg) by mouth 3 times daily as needed for mild pain or fever (total acetaminophen dose should not exceed 3000 mg per day)   albuterol (PROAIR HFA, PROVENTIL HFA, VENTOLIN HFA) 108 (90 BASE) MCG/ACT inhaler Inhale 2 puffs into the lungs every 6 hours as needed.   bisacodyl (DULCOLAX) 10 MG suppository Place 1 suppository (10 mg) rectally daily as needed for constipation   blood glucose (NO BRAND SPECIFIED) lancets standard Use to test blood sugar 5 times daily or as directed.   blood glucose monitoring (NO BRAND SPECIFIED) meter device kit Use to test blood sugar 3 times daily or as directed.   blood glucose monitoring (NO BRAND SPECIFIED) test strip Use to test blood sugars 3 times daily or as directed   Blood Glucose Monitoring Suppl (IB STAR) W/DEVICE KIT -PLEASE GIVE PATIENT A DEVICE HER INSURANCE WILL COVER-   buPROPion (WELLBUTRIN SR) 100 MG 12 hr tablet Take 1 tablet (100 mg) by mouth 2 times daily   capsaicin (ZOSTRIX) 0.025 % CREA Apply 1 g topically 3 times daily as needed   ciclopirox 8 % SOLN Externally apply topically daily To toenails.   clotrimazole (LOTRIMIN) 1 % cream Apply topically 2 times daily   colchicine 0.6 MG tablet Take 1-2 tablets (0.6-1.2 mg) by mouth daily as needed for moderate pain   diclofenac (VOLTAREN) 1 % GEL topical gel Apply 4 grams to knees or 2 grams to hands four times daily using enclosed dosing card.   dulaglutide (TRULICITY) 1.5 MG/0.5ML pen Inject 1.5 mg Subcutaneous every 7 days   DULoxetine (CYMBALTA) 20 MG capsule Take 1 capsule (20 mg) by mouth 2 times daily   Efinaconazole 10 % SOLN Externally apply topically daily To toenails.   gabapentin (NEURONTIN) 300 MG capsule Take 2 capsules (600 mg) by mouth 2 times daily Needs PCP appt for further refills   hydrALAZINE (APRESOLINE) 25 MG tablet Take 1 tab (25 mg) in am, 2 tabs (50 mg) midday, and 3 tabs (75 mg) in pm  daily   insulin glargine U-300 (TOUJEO) 300 UNIT/ML injection Inject 170 units SQ each am.   levothyroxine (SYNTHROID/LEVOTHROID) 200 MCG tablet Take 1 tab Monday thru Saturday and 2 tabs on Sundays.   metolazone (ZAROXOLYN) 2.5 MG tablet Take 1 tablet (2.5 mg) by mouth 2 times daily Take 1/2 hour prior to torsemide   metoprolol succinate (TOPROL-XL) 25 MG 24 hr tablet Take 1 tablet (25 mg) by mouth At Bedtime   morphine (MS CONTIN) 15 MG 12 hr tablet Take 15 mg by mouth daily as needed   NOVOLOG FLEXPEN 100 UNIT/ML soln Inject 65 units with meals plus correction. Pt uses approx 190 units in 24 hrs.   nystatin (MYCOSTATIN) cream Apply topically 2 times daily To toenails   order for DME Left foot   order for DME Equipment being ordered: Challenger Wide walker if available - patient needs seat, basket and brakes.   ORDER FOR DME Use your CPAP device as directed by your provider. Pressure change to min 13 max 18cwp   oxyCODONE-acetaminophen (PERCOCET) 5-325 MG per tablet Take 1 tablet by mouth every 8 hours as needed for moderate to severe pain (try to limit use, no further prescriptions until seen in pain clinic)   polyethylene glycol (MIRALAX/GLYCOLAX) powder    potassium chloride SA (POTASSIUM CHLORIDE) 20 MEQ CR tablet Take 2 tablets (40 mEq) by mouth 4 times daily   senna (SENOKOT) 8.6 MG tablet Take 1 tablet by mouth 2 times daily   spironolactone (ALDACTONE) 50 MG tablet Take 1 tablet (50 mg) by mouth daily   topiramate (TOPAMAX) 25 MG tablet 25 mg at bedtime for 1 week, 50 mg at bedtime for 1 week and 75 mg daily at bedtime thereafter   torsemide (DEMADEX) 100 MG tablet Take 1 tablet (100 mg) by mouth 2 times daily   warfarin (COUMADIN) 5 MG tablet Take one to two tablets daily or as directed by the Coumadin Clinic   nicotine ([employer] NICOTINE) 14 MG/24HR patch 2h hr Place 1 patch onto the skin every 24 hours (Patient not taking: Reported on 7/25/2018)   order for DME Equipment being ordered: [id number] $92  "[id number], fasciitis, LG, night splint   Respiratory Therapy Supplies (NEBULIZER COMPRESSOR) KIT 1 Device 4 times daily as needed. (Patient not taking: Reported on 7/25/2018)   tiotropium (SPIRIVA HANDIHALER) 18 MCG inhalation capsule Inhale contents of one capsule daily. (Patient not taking: Reported on 7/25/2018)     No current facility-administered medications on file prior to visit.       ROS:   Constitutional: No fever, chills, or sweats. No weight gain/loss.   ENT: No visual disturbance, ear ache, epistaxis, sore throat.   Allergies/Immunologic: Negative.   Respiratory: No cough, hemoptysis.   Cardiovascular: As per HPI.   GI: No nausea, vomiting, hematemesis, melena, or hematochezia.   : No urinary frequency, dysuria, or hematuria.   Integument: Negative.   Psychiatric: Negative.   Neuro: Negative.   Endocrinology: Negative.   Musculoskeletal: Negative.    EXAM:  /79 (BP Location: Left arm, Patient Position: Chair, Cuff Size: Adult Regular)  Pulse 94  Ht 1.702 m (5' 7\")  Wt (!) 212.4 kg (468 lb 3.2 oz)  SpO2 98%  BMI 73.33 kg/m2  General: Obese female;  appears comfortable, alert and articulate; She gets SOB with getting on exam table  Eyes: anicteric sclera, EOMI  Neck: no adenopathy  Orophyarynx: moist mucosa, no lesions, dentition intact  Heart: regular, S1/S2, no murmur, gallop, rub, estimated JVP 8cm  Lungs: clear, no rales or wheezing  Abdomen: obese; soft, non-tender, bowel sounds present, no hepatomegaly  Extremities: no clubbing, cyanosis or edema  Neurological: normal speech and affect, no gross motor deficits    Labs:  CMP RESULTS:  Lab Results   Component Value Date     07/25/2018    POTASSIUM 3.9 07/25/2018    CHLORIDE 105 07/25/2018    CO2 24 07/25/2018    ANIONGAP 8 07/25/2018     (H) 07/25/2018    BUN 14 07/25/2018    CR 0.96 07/25/2018    GFRESTIMATED 63 07/25/2018    GFRESTBLACK 76 07/25/2018    JUSTIN 8.3 (L) 07/25/2018    BILITOTAL 0.4 05/29/2018    ALBUMIN 3.6 " 05/29/2018    ALKPHOS 85 05/29/2018    ALT 20 05/29/2018    AST 12 05/29/2018      Zio Monitor (4/18)  Predominant rhythm is sinus rhythm.   1 run of VT lasting 6 beats at a rate of 200 bpm  AF burdeno f 7%  AF with RVR episode associated with patient symptoms    RHC (2/2017)  RA 13  PA 32/17, 23  PAW 25, 25, 20  CO Catarina: 7.3 (2.6)    Assessment and Plan:   Pricilla is a pleasant 43-year-old woman with NICM and systolic heart failure. She remains volume up. She will resume metolazone twice daily. Her blood pressure is not adequately controlled. She will increase her hydralazine morning dose to 50 mg, continuing midday and evening doses at 50 and 75 mg, respectively.  She will increase Toprol to 50 mg daily.  Pricilla was encouraged to eat a potassium rich diet given the increase of her diuretics and will have labs repeated in 1 week.  Return to clinic in 1 month or as needed.      1. Chronic systolic heart failure secondary to NICM.    Stage C  NYHA Class IIIa  ACEi/ARB yes  BB no - Toprol increasing today to 50 mg daily.  Aldosterone antagonist yes  SCD prophylaxis does not meet criteria for implant  % BiV pacing: N/A  Fluid status  hypervoliemic - resume BID dosing of metolazone    2. AFib: - Increasing Toprol today. Anticoagulated    3. Ckd: suspect due to diabetic nephropathy - Repeat labs Monday    5. Tobacco use: Continues smoking. Encouraged to quit and support offered.    6. HTN; suboptimal control -- increasing Toprol and Hydralazine today    7. Morbid obesity - unchanged    8. DM: managed by PCP. Would consider addition of Jardiance given positive cardiovascular impact    9. Hypothyroidism: on Synthroid - has had radiation therapy.       30 minutes spent in direct care, >50% in counseling    Please do not hesitate to contact me if you have any questions/concerns.     Sincerely,     Ximena Carpenter NP    CC  Patient Care Team:  Jamilah Bernal MD as PCP - General (Internal Medicine)  Percy Alcala  MD Blayne as MD (Cardiology)  Silva Damon MD as MD (Internal Medicine)  Norman Jean DPM (Podiatry)  Zita Liao as Registered Nurse (Diabetes Education)  Judy, Steph Hope PA-C as Physician Assistant (Physician Assistant)  Delmi Orellana MD as MD (Ophthalmology)  Ruben, Nelly Rogers MD as MD (Ophthalmology)  Drea Rosas RN as Clinic Care Coordinator (Bariatric)  Isela Archibald PA-C as Physician Assistant (Physician Assistant)  Tom Guardado MD as MD (Ophthalmology)  Russel Vergara OD as MD (Optometry)  Ophelia Kenny, RN as Nurse Coordinator (Cardiology)  Kristen Cuello APRN CNP as Nurse Practitioner (Cardiology)  Ximena Carpenter NP as Nurse Practitioner (Cardiology)  Ximena Carpenter NP as Nurse Practitioner (Nurse Practitioner - Family)  Vladislav Samuel MD as MD (Ophthalmology)  SELF, REFERRED

## 2018-07-25 NOTE — PATIENT INSTRUCTIONS
"You were seen today in the Cardiovascular Clinic at the [facility].     Cardiology Providers you saw during your visit: Ximena Carpenter NP     Follow up and medication changes:  1. Please increase metoprolol to 50 mg every night at bedtime    2. Please increase hydralazine to 50 mg in the morning and mid-day, and 75 mg in the evening    3. Please restart your metolazone twice daily and eat more potassium rich foods like bananas, apricots, oranges, etc.    4. Please have blood drawn in 1 week    5. Please return to clinic in 1 month and call anytime      Results for BRAXTON SALMERON (MRN [id number]) as of 2018 13:47   Ref. Range 2018 13:21   Sodium Latest Ref Range: 133 - 144 mmol/L 136   Potassium Latest Ref Range: 3.4 - 5.3 mmol/L 3.9   Chloride Latest Ref Range: 94 - 109 mmol/L 105   Carbon Dioxide Latest Ref Range: 20 - 32 mmol/L 24   Urea Nitrogen Latest Ref Range: 7 - 30 mg/dL 14   Creatinine Latest Ref Range: 0.52 - 1.04 mg/dL 0.96   GFR Estimate Latest Ref Range: >60 mL/min/1.7m2 63   GFR Estimate If Black Latest Ref Range: >60 mL/min/1.7m2 76   Calcium Latest Ref Range: 8.5 - 10.1 mg/dL 8.3 (L)   Anion Gap Latest Ref Range: 3 - 14 mmol/L 8   Glucose Latest Ref Range: 70 - 99 mg/dL 218 (H)   INR Latest Ref Range: 0.86 - 1.14  3.17 (H)       Please limit your fluid intake to 2 L (68 ounces) daily.  2 Liters a day = 8.5 cups, or 68 ounces.  Please limit your salt intake to 2 grams a day or less.     If you gain 2# in 24 hours or 5# in one week call Amelia Lowry RN so we can adjust your medications as needed over the phone.     Please feel free to call me with any questions or concerns.       Amelia Lowry RN  [facility]  Cardiology Care Coordinator-Heart Failure Clinic     Questions and [phone].   First press #1 for the Locality and then press #3 for \"Medical Questions\" to reach us Cardiology Nurses.      On Call Cardiologist for after hours " or on weekends: [phone]   option #4 and ask to speak to the on-call Cardiologist. Inform them you are a CORE/heart failure patient at the [place].           If you need a medication refill please contact your pharmacy.  Please allow 3 business days for your refill to be completed.  _______________________________________________________  C.O.R.E. CLINIC Cardiomyopathy, Optimization, Rehabilitation, Education   The C.O.R.E. CLINIC is a heart failure specialty clinic within the [facility] where you will work with specialized nurse practitioners dedicated to helping patients with heart failure carefully adjust medications, receive education, and learn who and when to call if symptoms develop. They specialize in helping you better understand your condition, slow the progression of your disease, improve the length and quality of your life, help you detect future heart problems before they become life threatening, and avoid hospitalizations.  As always, thank you for trusting us with your health care needs!

## 2018-07-25 NOTE — NURSING NOTE
Chief Complaint   Patient presents with     Follow Up For     Return CORE; 43yr old female with a h/o chronic systolic heart failure secondary to nonischemic cardiomyopathy presenting for follow-up with labs prior     Vitals were taken and medications were reconciled.     Anjelica Kiser MA    1:59 PM

## 2018-07-25 NOTE — MR AVS SNAPSHOT
Pricilla Brown   7/25/2018   Anticoagulation Therapy Visit    Description:  43 year old female   Provider:  Blayne Loyd [facility]   Department:  [facility]           INR as of 7/25/2018     Today's INR 3.17!      Anticoagulation Summary as of 7/25/2018     INR goal 2.0-2.5   Today's INR 3.17!   Full warfarin instructions 7/25: 5 mg; 7/26: 15 mg; 7/27: 20 mg; 7/28: 15 mg; 7/29: 15 mg; 7/30: 20 mg; 7/31: 15 mg   Next INR check 8/1/2018    Indications   Atrial fibrillation (H) [I48.91] [I48.91]  Long-term (current) use of anticoagulants [Z79.01] [Z79.01]         July 2018 Details    Sun Mon Tue Wed Thu Fri Sat     1               2               3               4               5               6               7                 8               9               10               11               12               13               14                 15               16               17               18               19               20               21                 22               23               24               25      5 mg   See details      26      15 mg         27      20 mg         28      15 mg           29      15 mg         30      20 mg         31      15 mg              Date Details   07/25 This INR check               How to take your warfarin dose     To take:  5 mg Take 1 of the 5 mg tablets.    To take:  15 mg Take 3 of the 5 mg tablets.    To take:  20 mg Take 4 of the 5 mg tablets.           August 2018 Details    Sun Mon Tue Wed Thu Fri Sat        1            2               3               4                 5               6               7               8               9               10               11                 12               13               14               15               16               17               18                 19               20               21               22               23               24               25                 26               27                28               29               30               31                 Date Details   No additional details    Date of next INR:  8/1/2018

## 2018-07-25 NOTE — MR AVS SNAPSHOT
After Visit Summary   7/25/2018    Pricilla Salmeron    MRN: [id number]           Patient Information     Date Of Birth          1975        Visit Information        Provider Department      7/25/2018 2:00 PM Ximena Carpenter NP [facility]        Today's Diagnoses     Acute on chronic systolic heart failure (H)        Chronic diastolic heart failure (H)          Care Instructions    You were seen today in the Cardiovascular Clinic at the [facility].     Cardiology Providers you saw during your visit: Ximena Carpenter NP     Follow up and medication changes:  1. Please increase metoprolol to 50 mg every night at bedtime    2. Please increase hydralazine to 50 mg in the morning and mid-day, and 75 mg in the evening    3. Please restart your metolazone twice daily and eat more potassium rich foods like bananas, apricots, oranges, etc.    4. Please have blood drawn in 1 week    5. Please return to clinic in 1 month and call anytime      Results for PRICILLA SALMERON (MRN [id number]) as of 7/25/2018 13:47   Ref. Range 7/25/2018 13:21   Sodium Latest Ref Range: 133 - 144 mmol/L 136   Potassium Latest Ref Range: 3.4 - 5.3 mmol/L 3.9   Chloride Latest Ref Range: 94 - 109 mmol/L 105   Carbon Dioxide Latest Ref Range: 20 - 32 mmol/L 24   Urea Nitrogen Latest Ref Range: 7 - 30 mg/dL 14   Creatinine Latest Ref Range: 0.52 - 1.04 mg/dL 0.96   GFR Estimate Latest Ref Range: >60 mL/min/1.7m2 63   GFR Estimate If Black Latest Ref Range: >60 mL/min/1.7m2 76   Calcium Latest Ref Range: 8.5 - 10.1 mg/dL 8.3 (L)   Anion Gap Latest Ref Range: 3 - 14 mmol/L 8   Glucose Latest Ref Range: 70 - 99 mg/dL 218 (H)   INR Latest Ref Range: 0.86 - 1.14  3.17 (H)       Please limit your fluid intake to 2 L (68 ounces) daily.  2 Liters a day = 8.5 cups, or 68 ounces.  Please limit your salt intake to 2 grams a day or less.     If you gain 2# in 24 hours or 5# in one week call Amelia Lorwy RN so we can  "adjust your medications as needed over the phone.     Please feel free to call me with any questions or concerns.       Amelia Lowry RN  [facility]  Cardiology Care Coordinator-Heart Failure Clinic     Questions and [phone].   First press #1 for the University and then press #3 for \"Medical Questions\" to reach us Cardiology Nurses.      On Call Cardiologist for after hours or on weekends: [phone]   option #4 and ask to speak to the on-call Cardiologist. Inform them you are a CORE/heart failure patient at the [facility].           If you need a medication refill please contact your pharmacy.  Please allow 3 business days for your refill to be completed.  _______________________________________________________  C.O.R.E. CLINIC Cardiomyopathy, Optimization, Rehabilitation, Education   The C.O.R.E. CLINIC is a heart failure specialty clinic within the [facility] where you will work with specialized nurse practitioners dedicated to helping patients with heart failure carefully adjust medications, receive education, and learn who and when to call if symptoms develop. They specialize in helping you better understand your condition, slow the progression of your disease, improve the length and quality of your life, help you detect future heart problems before they become life threatening, and avoid hospitalizations.  As always, thank you for trusting us with your health care needs!             Follow-ups after your visit        Additional Services     Follow-Up with [facility]                 Your next 10 appointments already scheduled     2018 10:45 AM CDT   (Arrive by 10:30 AM)   Return Weight Management Visit with Steph Kim PA-C   [facility] Medical Weight Management ([facility])    [address] [address] [phone]            Aug 02, 2018 11:15 AM CDT "   Lab with  LAB    Health Lab ([facility])    [address]  1st Floor  [facility] [address]   [phone]            Aug 02, 2018 11:30 AM CDT   (Arrive by 11:15 AM)   NUTRITION VISIT with Paulette Ruiz RD   [facility] Surgical Weight Management ([facility])    [address]  4th Floor  [facility] [address]   [phone]            Aug 03, 2018  3:00 PM CDT   (Arrive by 2:45 PM)   RETURN DIABETES with Isela Archibald PA-C   [facility] Endocrinology ([facility])    [address]  3rd Floor  [facility] [address] [phone]            Aug 07, 2018  9:15 AM CDT   Pool Evaluation with Chantal Chavis, PT   [facility] Physical Therapy ([facility])    [address]  [address]  [facility] [address]   [phone]            Aug 08, 2018  8:30 AM CDT   (Arrive by 8:15 AM)   NEW NEURO with Vladislav Samuel MD   [facility] Ophthalmology ([facility])    [address]  4th Floor  [facility] [address] [phone]            Aug 20, 2018  1:00 PM CDT   Return Visit with Norman Jean DPM   [facility] ([facility])    [address] [place] [address] [phone]            Sep 06, 2018 12:00 PM CDT   Lab with [facility]    Health Lab ([facility])    [address]  1st [facility] [address]   [phone]            Sep 06, 2018 12:30 PM CDT   (Arrive by 12:15 PM)   CORE RETURN with Ximena Carpenter NP   [facility] Heart Care ([facility])    [address]  [address]  [facility] [address] [phone]            Sep 25, 2018 11:30 AM CDT   (Arrive by 11:15 AM)   Return Weight Management Visit with Silva Damon MD   [facility] Medical Weight Management ([facility]  "[place])    [address] [id number] [phone]              Future tests that were ordered for you today     Open Future Orders        Priority Expected Expires Ordered    Basic metabolic panel Routine 8/1/2018 8/3/2018 7/25/2018    Follow-Up with CORE Clinic Routine 9/5/2018 9/12/2018 7/25/2018            Who to contact     If you have questions or need follow up information about today's clinic visit or your schedule please contact [facility] directly at [phone].  Normal or non-critical lab and imaging results will be communicated to you by [email or web address], letter or phone within 4 business days after the clinic has received the results. If you do not hear from us within 7 days, please contact the clinic through [employer] or phone. If you have a critical or abnormal lab result, we will notify you by phone as soon as possible.  Submit refill requests through [employer] or call your pharmacy and they will forward the refill request to us. Please allow 3 business days for your refill to be completed.          Additional Information About Your Visit        [email or web address] Information     [employer] gives you secure access to your electronic health record. If you see a primary care provider, you can also send messages to your care team and make appointments. If you have questions, please call your primary care clinic.  If you do not have a primary care provider, please call [phone] and they will assist you.        Care EveryWhere ID     This is your Care EveryWhere ID. This could be used by other organizations to access your [facility] medical records  [id number]        Your Vitals Were     Pulse Height Pulse Oximetry BMI (Body Mass Index)          94 1.702 m (5' 7\") 98% 73.33 kg/m2         Blood Pressure from Last 3 Encounters:   07/25/18 142/79   07/19/18 119/78   06/19/18 141/86    Weight from Last 3 Encounters:   07/25/18 (!) 212.4 kg (468 lb 3.2 oz)   07/19/18 (!) 210.5 kg (464 lb) "   06/19/18 (!) 213.7 kg (471 lb 3.2 oz)              We Performed the Following     Follow-Up with [facility]          Today's Medication Changes          These changes are accurate as of 7/25/18  2:43 PM.  If you have any questions, ask your nurse or doctor.               These medicines have changed or have updated prescriptions.        Dose/Directions    metoprolol succinate 50 MG 24 hr tablet   Commonly known as:  TOPROL-XL   This may have changed:    - medication strength  - how much to take   Used for:  Chronic diastolic heart failure (H)   Changed by:  Ximena Carpenter, ALEXANDER        Dose:  50 mg   Take 1 tablet (50 mg) by mouth At Bedtime   Quantity:  30 tablet   Refills:  3            Where to get your medicines      These medications were sent to [address] [place] [address] [address] [place], [place] [id number]    Hours:  TRANSPLANT PHONE NUMBER [phone] Phone:  [phone]     metoprolol succinate 50 MG 24 hr tablet                Primary Care Provider Office Phone # Fax #    Jamilah Bernal [phone]       [address] [place] [address]        Equal Access to Services     KIERSTEN DRIVER AH: Hadii miki oviedoo Sorosmery, waaxda luqadaha, qaybta kaalmada adeegyada, bola walker. So [facility] [phone].    ATENCIÓN: Si habla español, tiene a de la rosa disposición servicios gratuitos de asistencia lingüística. Llame al [phone].    We comply with applicable federal civil rights laws and [place] laws. We do not discriminate on the basis of race, color, national origin, age, disability, sex, sexual orientation, or gender identity.            Thank you!     Thank you for choosing [facility]  for your care. Our goal is always to provide you with excellent care. Hearing back from our patients is one way we can continue to improve our services. Please take a few minutes to  complete the written survey that you may receive in the mail after your visit with us. Thank you!             Your Updated Medication List - Protect others around you: Learn how to safely use, store and throw away your medicines at www.disposemymeds.org.          This list is accurate as of 7/25/18  2:43 PM.  Always use your most recent med list.                   Brand Name Dispense Instructions for use Diagnosis    acetaminophen 325 MG tablet    TYLENOL    180 tablet    Take 2 tablets (650 mg) by mouth 3 times daily as needed for mild pain or fever (total acetaminophen dose should not exceed 3000 mg per day)    Neuropathic pain of lower extremity, unspecified laterality       albuterol 108 (90 Base) MCG/ACT Inhaler    PROAIR HFA/PROVENTIL HFA/VENTOLIN HFA    1 Inhaler    Inhale 2 puffs into the lungs every 6 hours as needed.    Reactive airway disease       bisacodyl 10 MG Suppository    DULCOLAX    6 suppository    Place 1 suppository (10 mg) rectally daily as needed for constipation    Constipation       blood glucose lancets standard    no brand specified    200 each    Use to test blood sugar 5 times daily or as directed.    Diabetes mellitus, type 2 (H)       blood glucose monitoring test strip    no brand specified    100 strip    Use to test blood sugars 3 times daily or as directed    Diabetes mellitus, type 2 (H)       buPROPion 100 MG 12 hr tablet    WELLBUTRIN SR    180 tablet    Take 1 tablet (100 mg) by mouth 2 times daily    Tobacco abuse       capsaicin 0.025 % Crea cream    ZOSTRIX    1 Tube    Apply 1 g topically 3 times daily as needed    Dermatophytosis of nail       ciclopirox 8 % Soln     1 Bottle    Externally apply topically daily To toenails.    Dermatophytosis of nail       clotrimazole 1 % cream    LOTRIMIN    30 g    Apply topically 2 times daily    Tinea pedis of right foot       colchicine 0.6 MG tablet    COLCYRS    12 tablet    Take 1-2 tablets (0.6-1.2 mg) by mouth daily as needed  for moderate pain    Acute gouty arthritis       diclofenac 1 % Gel topical gel    VOLTAREN    100 g    Apply 4 grams to knees or 2 grams to hands four times daily using enclosed dosing card.    Left foot pain       dulaglutide 1.5 MG/0.5ML pen    TRULICITY    45 mL    Inject 1.5 mg Subcutaneous every 7 days    Type 2 diabetes mellitus with hyperglycemia, with long-term current use of insulin (H)       DULoxetine 20 MG EC capsule    CYMBALTA    60 capsule    Take 1 capsule (20 mg) by mouth 2 times daily    Depression       Efinaconazole 10 % Soln     8 mL    Externally apply topically daily To toenails.    Dermatophytosis of nail       gabapentin 300 MG capsule    NEURONTIN    120 capsule    Take 2 capsules (600 mg) by mouth 2 times daily Needs PCP appt for further refills    Type 2 diabetes mellitus with hyperglycemia, with long-term current use of insulin (H)       hydrALAZINE 25 MG tablet    APRESOLINE    450 tablet    Take 50-75 mg by mouth Take 2 tabs (50 mg) in am, 2 tabs (50 mg) midday, and 3 tabs (75 mg) in pm daily    Dilated cardiomyopathy (H)       * iBG star w/Device Kit     1 kit    -PLEASE GIVE PATIENT A DEVICE HER INSURANCE WILL COVER-    Type 2 diabetes mellitus with hyperglycemia, with long-term current use of insulin (H)       * blood glucose monitoring meter device kit    no brand specified    1 kit    Use to test blood sugar 3 times daily or as directed.    Diabetes mellitus, type 2 (H)       insulin glargine U-300 300 UNIT/ML injection    TOUJEO    30 mL    Inject 170 units SQ each am.    Type 2 diabetes mellitus with hyperglycemia, with long-term current use of insulin (H)       levothyroxine 200 MCG tablet    SYNTHROID/LEVOTHROID    102 tablet    Take 1 tab Monday thru Saturday and 2 tabs on Sundays.    Other specified hypothyroidism       metolazone 2.5 MG tablet    ZAROXOLYN    30 tablet    Take 1 tablet (2.5 mg) by mouth 2 times daily Take 1/2 hour prior to torsemide    Acute on chronic  systolic heart failure (H)       metoprolol succinate 50 MG 24 hr tablet    TOPROL-XL    30 tablet    Take 1 tablet (50 mg) by mouth At Bedtime    Chronic diastolic heart failure (H)       morphine 15 MG 12 hr tablet    MS CONTIN     Take 15 mg by mouth daily as needed        Nebulizer Compressor Kit     1 kit    1 Device 4 times daily as needed.    COPD (chronic obstructive pulmonary disease) (H)       nicotine 14 MG/24HR 24 hr patch    [employer] NICOTINE    30 patch    Place 1 patch onto the skin every 24 hours    Tobacco abuse       NovoLOG FLEXPEN 100 UNIT/ML injection   Generic drug:  insulin aspart     180 mL    Inject 65 units with meals plus correction. Pt uses approx 190 units in 24 hrs.    Type 2 diabetes mellitus with hyperglycemia, with long-term current use of insulin (H)       nystatin cream    MYCOSTATIN    90 g    Apply topically 2 times daily To toenails    Dermatophytosis of nail       order for DME     1 each    Use your CPAP device as directed by your provider. Pressure change to min 13 max 18cwp        * order for DME     1 each    Equipment being ordered: Challenger Wide walker if available - patient needs seat, basket and brakes.    Dilated cardiomyopathy (H), Chronic systolic heart failure (H)       * order for DME     1 each    Equipment being ordered: [id number] $92  Plantar, fasciitis, LG, night splint    Dermatophytosis of nail, Plantar fasciitis of right foot, Diabetic neuropathy with neurologic complication (H), Peroneal tendinitis, right       * order for DME     1 Units    Left foot    Left foot pain, Diabetic neuropathy with neurologic complication (H)       oxyCODONE-acetaminophen 5-325 MG per tablet    PERCOCET    60 tablet    Take 1 tablet by mouth every 8 hours as needed for moderate to severe pain (try to limit use, no further prescriptions until seen in pain clinic)    Lumbago, Bilateral low back pain with sciatica, sciatica laterality unspecified       polyethylene glycol  powder    MIRALAX/GLYCOLAX          potassium chloride SA 20 MEQ CR tablet    KLOR-CON    240 tablet    Take 2 tablets (40 mEq) by mouth 4 times daily    Hypokalemia       senna 8.6 MG tablet    SENOKOT    45 tablet    Take 1 tablet by mouth 2 times daily    Constipation       spironolactone 50 MG tablet    ALDACTONE    30 tablet    Take 1 tablet (50 mg) by mouth daily        tiotropium 18 MCG capsule    SPIRIVA HANDIHALER    30 capsule    Inhale contents of one capsule daily.    COPD (chronic obstructive pulmonary disease) (H)       topiramate 25 MG tablet    TOPAMAX    360 tablet    25 mg at bedtime for 1 week, 50 mg at bedtime for 1 week and 75 mg daily at bedtime thereafter    Morbid obesity (H)       torsemide 100 MG tablet    DEMADEX    180 tablet    Take 1 tablet (100 mg) by mouth 2 times daily    Chronic diastolic heart failure (H)       warfarin 5 MG tablet    COUMADIN    90 tablet    Take one to two tablets daily or as directed by the Coumadin Clinic    Atrial fibrillation, unspecified type (H), Long-term (current) use of anticoagulants       * Notice:  This list has 5 medication(s) that are the same as other medications prescribed for you. Read the directions carefully, and ask your doctor or other care provider to review them with you.

## 2018-08-06 DIAGNOSIS — M10.9 ACUTE GOUTY ARTHRITIS: ICD-10-CM

## 2018-08-07 ENCOUNTER — HOSPITAL ENCOUNTER (OUTPATIENT)
Dept: PHYSICAL THERAPY | Facility: CLINIC | Age: 43
Setting detail: THERAPIES SERIES
End: 2018-08-07
Attending: INTERNAL MEDICINE
Payer: MEDICARE

## 2018-08-07 ENCOUNTER — CARE COORDINATION (OUTPATIENT)
Dept: CARDIOLOGY | Facility: CLINIC | Age: 43
End: 2018-08-07

## 2018-08-07 PROCEDURE — 97162 PT EVAL MOD COMPLEX 30 MIN: CPT | Mod: GP | Performed by: PHYSICAL THERAPIST

## 2018-08-07 PROCEDURE — G8978 MOBILITY CURRENT STATUS: HCPCS | Mod: GP,CL | Performed by: PHYSICAL THERAPIST

## 2018-08-07 PROCEDURE — 40000718 ZZHC STATISTIC PT DEPARTMENT ORTHO VISIT: Performed by: PHYSICAL THERAPIST

## 2018-08-07 PROCEDURE — G8979 MOBILITY GOAL STATUS: HCPCS | Mod: GP,CK | Performed by: PHYSICAL THERAPIST

## 2018-08-07 RX ORDER — COLCHICINE 0.6 MG/1
.6-1.2 TABLET ORAL DAILY PRN
Qty: 30 TABLET | Refills: 0 | Status: SHIPPED | OUTPATIENT
Start: 2018-08-07 | End: 2018-12-30

## 2018-08-07 NOTE — PROGRESS NOTES
"   08/07/18 0900   Quick Adds   Quick Adds Certification   Type of Visit Initial OP PT Evaluation   General Information   Start of Care Date 08/07/18   Referring Physician Percy Alcala MD   Orders Evaluate and Treat as Indicated   Order Date 05/29/18   Medical Diagnosis Morbid Obesity   Onset of illness/injury or Date of Surgery 05/29/18   Pertinent history of current problem (include personal factors and/or comorbidities that impact the POC) The pt presents for pool therapy evaluation. Is looking to try to lose 45 lbs to be eligable for bariatric surgery. PMH significant for DMII, dilated cardiomyopathy, PE, hypothyroidism, HTN. The pt is having difficulty with exerccise due to LBP. The pt reports a history of back pain that began when she was 17. Pain is worse with standing and prolonged sitting, walking. The pt also reports B foot pain. Pt has neuropathh with her diabetes   Prior level of function comment Has done nustep and ellptical   Living environment Apartment/condo   Home/Community Accessibility Comments 6 steps up and down   Patient/Family Goals Statement Lose 45 lbs, Decrease pain, improve health   Fall Risk Screen   Fall screen completed by PT   Have you fallen 2 or more times in the past year? No   Have you fallen and had an injury in the past year? No   Is patient a fall risk? Yes  (seems dependent on pain levels)   Fall screen comments Had 1 fall in shower   Functional Scales   Functional Scales and Outcomes MECHE: 64%   Pain   Pain comments 9/10 currently in feet, back 7/10. Reports that this is a bad day.   Cognitive Status Examination   Orientation orientation to person, place and time   Integumentary   Integumentary Comments generalized LE edema   Range of Motion (ROM)   ROM Comment Limited B hip flexion \"pulls on back\"   Strength   Strength Comments Hip flexion 2/5 (painful), knee extension 5/5, dorsiflexion 4/5. Demonstrates functioanl strength with sit to stand [facility]   Bed Mobility   Bed " "Mobility Comments Difficult with prolonged lying, occasionally requires min assist   Transfer Skills   Transfer Comments Requires use of UEs from standard chair. Able to stand from 25\" mat without UE support   Gait   Gait Comments Antalgic pattern, significant lean to right, shortened step length bilaterally, ambulates into session with SPC. States that she needed to sit 3 times while walking up to therapy department (about 300')   Balance   Balance Comments Requires UE support with standing today   Sensory Examination   Sensory Perception Comments reports B peripheral neuropathy, occasional sciatica.   Planned Therapy Interventions   Planned Therapy Interventions balance training;gait training;neuromuscular re-education;strengthening;ROM;stretching;manual therapy;transfer training   Clinical Impression   Criteria for Skilled Therapeutic Interventions Met yes, treatment indicated   PT Diagnosis Limited mobility   Influenced by the following impairments pain, obesity, weakness, fatigue, instability   Functional limitations due to impairments difficulty with gait, transfers, difficulty exercising due to pain, fall risk   Clinical Presentation Evolving/Changing   Clinical Presentation Rationale fluctuating pain levels   Clinical Decision Making (Complexity) Moderate complexity   Therapy Frequency 2 times/day  (initially 1x/week on land while awaiting pool openings)   Predicted Duration of Therapy Intervention (days/wks) 90 days   Risk & Benefits of therapy have been explained Yes   Patient, Family & other staff in agreement with plan of care Yes   Clinical Impression Comments The pt would benefit from land therap initally while awaiting pool availability   GOALS   PT Eval Goals 1;2;3   Goal 1   Goal Identifier HEP   Goal Description The pt will be independent with a pool therapy program in order to safely progress strength, balance and promote weight loss   Target Date 11/04/18   Goal 2   Goal Identifier MECHE   Goal " Description The patient's score on the MECHE will decrease to 52% impairment, demonstrating an improvement in QOL.   Target Date 11/04/18   Goal 3   Goal Identifier Walking tolerance   Goal Description The pt will report the ability to ambulate 250' without resting in order to improve ability to ambulate in the community   Target Date 11/04/18   Total Evaluation Time   Total Evaluation Time (Minutes) 35   Therapy Certification   Certification date from 08/07/18   Certification date to 11/04/18   Medical Diagnosis Morbid obesity   Certification I certify the need for these services furnished under this plan of treatment and while under my care.  (Physician co-signature of this document indicates review and certification of the therapy plan).

## 2018-08-07 NOTE — PROGRESS NOTES
[facility]        OUTPATIENT PHYSICAL THERAPY FUNCTIONAL EVALUATION  PLAN OF TREATMENT FOR OUTPATIENT REHABILITATION  (COMPLETE FOR INITIAL CLAIMS ONLY)  Patient's Last Name, First Name, M.I.  YOB: 1975  Pricilla Brown     Provider's Name   [facility]   Medical Record No.  [id number]     Start of Care Date:  08/07/18   Onset Date:  05/29/18   Type:     _X__PT   ____OT  ____SLP Medical Diagnosis:  Morbid obesity     PT Diagnosis:  Limited mobility Visits from SOC:  1                              __________________________________________________________________________________  Plan of Treatment/Functional Goals:  balance training, gait training, neuromuscular re-education, strengthening, ROM, stretching, manual therapy, transfer training           GOALS  HEP  The pt will be independent with a pool therapy program in order to safely progress strength, balance and promote weight loss  11/04/18    MECHE  The patient's score on the MECHE will decrease to 52% impairment, demonstrating an improvement in QOL.  11/04/18    Walking tolerance  The pt will report the ability to ambulate 250' without resting in order to improve ability to ambulate in the community  11/04/18                           Therapy Frequency:  2 times/day (initially 1x/week on land while awaiting pool openings)   Predicted Duration of Therapy Intervention:  90 days    Chantal Chavis, PT                                    I CERTIFY THE NEED FOR THESE SERVICES FURNISHED UNDER        THIS PLAN OF TREATMENT AND WHILE UNDER MY CARE     (Physician co-signature of this document indicates review and certification of the therapy plan).                Certification Date From:  08/07/18   Certification Date To:  11/04/18    Referring Provider:  Percy Alcala MD    Initial Assessment  See Epic Evaluation-  Start of Care Date: 08/07/18           Percy Alcala

## 2018-08-07 NOTE — TELEPHONE ENCOUNTER
Last Clinic Visit: 5/31/2017  [facility]  Appointment past due-no future visit.  Amy refill 30 days and message routed to clinic scheduler.

## 2018-08-07 NOTE — PROGRESS NOTES
Received message patient's transportation company had been trying to send a form for documentation to us, but they have been told it has not been received. Called patient and she will give them my email address.  If I don't receive it within an hour, I will call patient back.

## 2018-08-08 DIAGNOSIS — H53.10 SUBJECTIVE VISUAL DISTURBANCE: Primary | ICD-10-CM

## 2018-08-08 NOTE — TELEPHONE ENCOUNTER
Mobile number not accepting call. Called patient on home antwanmbpetr and left a message  to call back to schedule annual visit with one of the Primary Care provider as PCP is out until November. Left clinic number for scheduling an appointment

## 2018-08-09 ENCOUNTER — CARE COORDINATION (OUTPATIENT)
Dept: CARDIOLOGY | Facility: CLINIC | Age: 43
End: 2018-08-09

## 2018-08-09 NOTE — PROGRESS NOTES
Left Pricilla a message to follow up on labs that were supposed to be drawn one week after visit with Ximena Carpenter. Asked for call back.   Amelia Lowry RN

## 2018-08-14 ENCOUNTER — ALLIED HEALTH/NURSE VISIT (OUTPATIENT)
Dept: SURGERY | Facility: CLINIC | Age: 43
End: 2018-08-14
Payer: MEDICARE

## 2018-08-14 ENCOUNTER — ANTICOAGULATION THERAPY VISIT (OUTPATIENT)
Dept: ANTICOAGULATION | Facility: CLINIC | Age: 43
End: 2018-08-14

## 2018-08-14 DIAGNOSIS — Z79.01 LONG-TERM (CURRENT) USE OF ANTICOAGULANTS: ICD-10-CM

## 2018-08-14 DIAGNOSIS — I48.91 ATRIAL FIBRILLATION, UNSPECIFIED TYPE (H): ICD-10-CM

## 2018-08-14 DIAGNOSIS — I48.91 ATRIAL FIBRILLATION (H): ICD-10-CM

## 2018-08-14 DIAGNOSIS — I50.23 ACUTE ON CHRONIC SYSTOLIC HEART FAILURE (H): ICD-10-CM

## 2018-08-14 LAB
ANION GAP SERPL CALCULATED.3IONS-SCNC: 8 MMOL/L (ref 3–14)
BUN SERPL-MCNC: 18 MG/DL (ref 7–30)
CALCIUM SERPL-MCNC: 8.9 MG/DL (ref 8.5–10.1)
CHLORIDE SERPL-SCNC: 101 MMOL/L (ref 94–109)
CO2 SERPL-SCNC: 27 MMOL/L (ref 20–32)
CREAT SERPL-MCNC: 1.03 MG/DL (ref 0.52–1.04)
GFR SERPL CREATININE-BSD FRML MDRD: 58 ML/MIN/1.7M2
GLUCOSE SERPL-MCNC: 168 MG/DL (ref 70–99)
INR PPP: 1.8 (ref 0.86–1.14)
POTASSIUM SERPL-SCNC: 4 MMOL/L (ref 3.4–5.3)
SODIUM SERPL-SCNC: 136 MMOL/L (ref 133–144)

## 2018-08-14 NOTE — MR AVS SNAPSHOT
MRN:[id number]                      After Visit Summary   8/14/2018    Pricilla Brown    MRN: [id number]           Visit Information        Provider Department      8/14/2018 10:30 AM Paulette Ruiz RD M [facility] Surgical Weight Management        Your next 10 appointments already scheduled     Aug 14, 2018 11:15 AM CDT   LAB with UC LAB   [facility] ([facility])    [address] [place] [id number] [phone]           Please do not eat 10-12 hours before your appointment if you are coming in fasting for labs on lipids, cholesterol, or glucose (sugar). This does not apply to pregnant women. Water, hot tea and black coffee (with nothing added) are okay. Do not drink other fluids, diet soda or chew gum.            Aug 17, 2018  2:30 PM CDT   Neuro Treatment with Chantal Chavis, PT   [facility] [place] [facility] ([facility])    [address] [place] [address]  [address]  [facility] [address]   [phone]            Aug 20, 2018  1:00 PM CDT   Return Visit with Norman Jean DPM   [facility] ([facility]    [address] [place] [id number]   [phone]            Aug 21, 2018 11:45 AM CDT   Neuro Treatment with Chantal Chavis, PT   [facility] [place] [facility] ([facility])    [address] [place] [address]  [address]  [facility] [address]   [phone]            Aug 28, 2018  1:00 PM CDT   Neuro Treatment with Chantal Chavis, PT   [facility] [place] [facility] ([facility])    [address] [place] [address]  [address]  [facility] [address]   [phone]            Sep 04, 2018 11:45 AM CDT   Neuro Treatment with Chantal Chavis, PT   [facility] [place] [facility] ([facility])    [address] [place] [address]  [address]  [facility] [address]   [phone]            Sep 06, 2018 12:00 PM CDT   Lab with  UC LAB    [facility] ([facility])    [address]  1st [address]  [facility] [address]   [phone]            Sep 06, 2018 12:30 PM CDT   (Arrive by 12:15 PM)   CORE RETURN with Ximena Carpenter NP    [facility] ([facility])    [address]  [address]  [facility] [address]   [phone]            Oct 04, 2018  1:30 PM CDT   (Arrive by 1:15 PM)   Return Weight Management Visit with Steph Kim PA-C   [facility] ([facility])    [address]  4th [address]  [facility] [address]   [phone]            Oct 09, 2018 11:15 AM CDT   (Arrive by 11:00 AM)   Return Weight Management Visit with Silva Damon MD   [facility] ([facility])    [address]  4th [address]  [facility] [address]   [phone]              Care Instructions    GOALS:  Relating To Eating:  -Follow the Modified Liquid Diet for weight loss:    Breakfast: Protein Shake (160-250 Jagjit, 20-35 g protein)  Lunch: 3 oz lean protein (chicken/turkey breast or fish)  + non-starchy vegetables (non-starchy veg: green leafy vegetables, cucumbers, broccoli, cauliflower, green string beans)  Supper: 3 oz lean protein (chicken/turkey breast or fish)  + non-starchy vegetables (non-starchy veg: green leafy vegetables, cucumbers, broccoli, cauliflower, green string beans)  Snack: 1 protein bar + non-starchy vegetables (no calorie-containing dips/condiments)  Beverages: at least 48-64 oz water between meals daily (Powerade Zero or Propel Zero or Crystal Light or Isael, less than 5 Calories per serving)    *Protein Shake Criteria: no more than 250 Calories, at least 20 grams of protein, and less than 10 grams of sugar     Frozen Meal Replacements  Healthy Choice  Lean Cuisine  Atkins Meals  Smart Ones    -Continue eating slowly (20-30 minutes per meal),  chewing foods well (25 chews per bite/applesauce consistency)    -Increase exercise as able.  (Pool therapy, walking, etc)    -Continue to work on quitting smoking.              [employer] Information     [employer] gives you secure access to your electronic health record. If you see a primary care provider, you can also send messages to your care team and make appointments. If you have questions, please call your primary care clinic.  If you do not have a primary care provider, please call [phone] and they will assist you.      [employer] is an electronic gateway that provides easy, online access to your medical records. With [employer], you can request a clinic appointment, read your test results, renew a prescription or communicate with your care team.     To access your existing account, please contact your [facility] or call [phone] for assistance.        Care EveryWhere ID     This is your Care EveryWhere ID. This could be used by other organizations to access your [facility] medical records  [id number]        Equal Access to Services     KIERSTEN DRIVER : Maryam oviedoo Sorosmery, waaxda beth, qaybta kaalmamk shah, bola walker. So [facility] [phone].    ATENCIÓN: Si habla español, tiene a de la rosa disposición servicios gratuitos de asistencia lingüística. Llame al [phone].    We comply with applicable federal civil rights laws and [place] laws. We do not discriminate on the basis of race, color, national origin, age, disability, sex, sexual orientation, or gender identity.

## 2018-08-14 NOTE — PROGRESS NOTES
Left voice message for pt to call us back to verify the dose of Warfarin she has been taking. Pt was requested to come back for an INR on 8/1 and so we have dosing after this date.

## 2018-08-14 NOTE — MR AVS SNAPSHOT
Pricillamarci Brown   8/14/2018   Anticoagulation Therapy Visit    Description:  43 year old female   Provider:  Demi Kebede, RN   Department:  [facility]           INR as of 8/14/2018     Today's INR       Anticoagulation Summary as of 8/14/2018     INR goal 2.0-2.5   Today's INR    Next INR check     Indications   Atrial fibrillation (H) [I48.91] [I48.91]  Long-term (current) use of anticoagulants [Z79.01] [Z79.01]         July 2018 Details    Sun Mon Tue Wed Thu Fri Sat     1               2               3               4               5               6               7                 8               9               10               11               12               13               14                 15               16               17               18               19               20               21                 22               23               24               25      5 mg   See details      26      15 mg         27      20 mg         28      15 mg           29      15 mg         30      20 mg         31      15 mg              Date Details   07/25 This INR check               How to take your warfarin dose     To take:  5 mg Take 1 of the 5 mg tablets.    To take:  15 mg Take 3 of the 5 mg tablets.    To take:  20 mg Take 4 of the 5 mg tablets.           August 2018 Details    Sun Mon Tue Wed Thu Fri Sat        1            2               3               4                 5               6               7               8               9               10               11                 12               13               14               15               16               17               18                 19               20               21               22               23               24               25                 26               27               28               29               30               31                 Date Details   No additional details    Date of next INR:   8/1/2018

## 2018-08-14 NOTE — PROGRESS NOTES
"  Bariatric Nutrition Consultation Note    Reason For Visit: Nutrition Reassessment    Pricilla Brown is a 43 year-old (type 2 DM on insulin sliding scale) presenting today for bariatric nutrition consult.  Pt is interested in laparoscopic sleeve gastrectomy.  Pt has met all required RD visits prior to surgery.  Pt was referred by NANETTE Kerr and Dr. Silva Damon.    Coordination Notes:,   5/29/18: In light of it being several months before anticipating surgery, writer will revisit task list when Pt is closer to weight loss goal.  Writer will continue to encourage Pt to come at least monthly to see RD.    ANTHROPOMETRICS:  Ht: 67\"  Initial Wt: 462.6 lbs with BMI of 72.6  Current Wt: 460 lbs (-9 lbs over the past month; -2.6 lbs from initial weight)     Required weight loss goal pre-op: -46 lbs from initial consult weight (goal weight 416.6 lbs or less before surgery)    NUTRITION HISTORY:  Food Allergies/Intolerances: none known  Cravings: sweets, chips, pop (diet)  Triggers/cues causing extra eating: boredom (currently on disability, not working)  *Pt is on topiramate per Dr. Silva Damon who would like Pt to re-try modified liquid diet with 2 shakes/day.    Progress with Previous Goals:  Relating To Eating:  -Follow the Modified Liquid Diet for weight loss:  - Pt has been having 1 protein shake for breakfast all days.  She feels that if she has 2 meals of a protein shake, she feels too deprived and then overeats as a result.    *Try having 2 protein shake meal replacements per day on Tuesdays, Thursdays, and Saturdays.  Have 1 protein shake per day on the other days of the week.    Breakfast: Protein Shake (250 Jagjit, 35 g protein)  Lunch: 3 oz lean protein (chicken/turkey breast or fish)  + non-starchy vegetables (non-starchy veg: green leafy vegetables, cucumbers, broccoli, cauliflower, green string beans)  Supper: Protein Shake (250 Jagjit, 35 g protein)  Snack: 1 protein bar + non-starchy " vegetables (no calorie-containing dips/condiments) - Pt has been having popcorn without butter once in awhile.  She cut out the sweets entirely and quit waking up to eat in the middle of the night.   Beverages: at least 48-64 oz water between meals daily (Powerade Zero or Propel Zero or Crystal Light or [place], less than 5 Calories per serving)    *Protein Shake Criteria: no more than 250 Calories, at least 20 grams of protein, and less than 10 grams of sugar     Frozen Meal Replacements  Healthy Choice  Lean Cuisine  Atkins Meals  Smart Ones    -Continue eating slowly (20-30 minutes per meal), chewing foods well (25 chews per bite/applesauce consistency)- Meeting.     -Increase exercise as able.  (Pool therapy, walking, etc)- She is doing land therapy right now.  She plans to restart pool therapy as well in September.     -Continue to work on quitting smoking.  - Pt has 2-5 cigarettes per day now and continues to work on this.     NUTRITION DIAGNOSIS:  Obesity r/t long history of self-monitoring deficit and excessive energy intake aeb BMI >30.- continues    INTERVENTION:  Intervention Provided/Education Provided:  Praised Pt on good progress with weight loss through positive changes made over the past month.  Reviewed previous goals, updating meal plan based upon Pt's report of not being ready for 2 shakes/day (see notes above).  Gave encouragement and support.  Provided Pt with list of goals and task list.      Note: Pt stated she did not purchase clinic pharmaceutical grade meal replacements due to financial challenges.     Patient Understanding: good  Expected Compliance: good      GOALS:  Relating To Eating:  -Follow the Modified Liquid Diet for weight loss:    Breakfast: Protein Shake (160-250 Jagjit, 20-35 g protein)  Lunch: 3 oz lean protein (chicken/turkey breast or fish)  + non-starchy vegetables (non-starchy veg: green leafy vegetables, cucumbers, broccoli, cauliflower, green string beans)  Supper: 3 oz lean  protein (chicken/turkey breast or fish)  + non-starchy vegetables (non-starchy veg: green leafy vegetables, cucumbers, broccoli, cauliflower, green string beans)  Snack: 1 protein bar + non-starchy vegetables (no calorie-containing dips/condiments)  Beverages: at least 48-64 oz water between meals daily (Powerade Zero or Propel Zero or Crystal Light or [place], less than 5 Calories per serving)    *Protein Shake Criteria: no more than 250 Calories, at least 20 grams of protein, and less than 10 grams of sugar     Frozen Meal Replacements  Healthy Choice  Lean Cuisine  Atkins Meals  Smart Ones    -Continue eating slowly (20-30 minutes per meal), chewing foods well (25 chews per bite/applesauce consistency)    -Increase exercise as able.  (Pool therapy, walking, etc)    -Continue to work on quitting smoking.       Follow-Up: follow-up with RD in 1 month.      Time spent with patient: 30 minutes     Paulette Ruiz, MS, RDN, LDN, CLT  Pager: [phone]

## 2018-08-14 NOTE — PATIENT INSTRUCTIONS
GOALS:  Relating To Eating:  -Follow the Modified Liquid Diet for weight loss:    Breakfast: Protein Shake (160-250 Jagjit, 20-35 g protein)  Lunch: 3 oz lean protein (chicken/turkey breast or fish)  + non-starchy vegetables (non-starchy veg: green leafy vegetables, cucumbers, broccoli, cauliflower, green string beans)  Supper: 3 oz lean protein (chicken/turkey breast or fish)  + non-starchy vegetables (non-starchy veg: green leafy vegetables, cucumbers, broccoli, cauliflower, green string beans)  Snack: 1 protein bar + non-starchy vegetables (no calorie-containing dips/condiments)  Beverages: at least 48-64 oz water between meals daily (Powerade Zero or Propel Zero or Crystal Light or Isael, less than 5 Calories per serving)    *Protein Shake Criteria: no more than 250 Calories, at least 20 grams of protein, and less than 10 grams of sugar     Frozen Meal Replacements  Healthy Choice  Lean Cuisine  Atkins Meals  Smart Ones    -Continue eating slowly (20-30 minutes per meal), chewing foods well (25 chews per bite/applesauce consistency)    -Increase exercise as able.  (Pool therapy, walking, etc)    -Continue to work on quitting smoking.

## 2018-08-15 ENCOUNTER — ANTICOAGULATION THERAPY VISIT (OUTPATIENT)
Dept: ANTICOAGULATION | Facility: CLINIC | Age: 43
End: 2018-08-15

## 2018-08-15 DIAGNOSIS — I48.91 ATRIAL FIBRILLATION, UNSPECIFIED TYPE (H): ICD-10-CM

## 2018-08-15 DIAGNOSIS — Z79.01 LONG-TERM (CURRENT) USE OF ANTICOAGULANTS: ICD-10-CM

## 2018-08-15 NOTE — MR AVS SNAPSHOT
Pricillamarci Brown   8/15/2018   Anticoagulation Therapy Visit    Description:  43 year old female   Provider:  Demi Kebede, RN   Department:  [facility]           INR as of 8/15/2018     Today's INR 1.80! (8/14/2018)      Anticoagulation Summary as of 8/15/2018     INR goal 2.0-2.5   Today's INR 1.80! (8/14/2018)   Full warfarin instructions 20 mg on Wed, Fri; 15 mg all other days   Next INR check     Indications   Atrial fibrillation (H) [I48.91] [I48.91]  Long-term (current) use of anticoagulants [Z79.01] [Z79.01]         August 2018 Details    Sun Mon Tue Wed Thu Fri Sat        1               2               3               4                 5               6               7               8               9               10               11                 12               13               14               15      20 mg   See details      16      15 mg         17      20 mg         18      15 mg           19      15 mg         20      15 mg         21      15 mg         22      20 mg         23      15 mg         24      20 mg         25      15 mg           26      15 mg         27      15 mg         28      15 mg         29      20 mg         30      15 mg         31      20 mg           Date Details   08/15 This INR check      Date of next INR: No date specified         How to take your warfarin dose     To take:  15 mg Take 3 of the 5 mg tablets.    To take:  20 mg Take 4 of the 5 mg tablets.

## 2018-08-15 NOTE — PROGRESS NOTES
ANTICOAGULATION FOLLOW-UP CLINIC VISIT    Patient Name:  Pricilla Brown  Date:  8/15/2018  Contact Type:  Telephone    SUBJECTIVE:     Patient Findings     Positives Unexplained INR or factor level change    Comments Pt left a voice mail about taking maintenance dose of 20mg WF and 15mg ROW. Pt denies any changes or missed doses. Has been a challenge to keep pt in her goal range            OBJECTIVE    INR   Date Value Ref Range Status   08/14/2018 1.80 (H) 0.86 - 1.14 Final     Chromogenic Factor 10   Date Value Ref Range Status   04/08/2017 19 (L) 70 - 130 % Final     Comment:     Therapeutic Range:  A Chromogenic Factor 10 level of approximately 20-40%   inversely correlates with an INR of 2-3 for patients receiving Warfarin.   Chromogenic Factor 10 levels below 20% indicate an INR greater than 3 and   levels above 40% indicate an INR less than 2.         ASSESSMENT / PLAN  INR assessment SUB    Recheck INR In: 1 WEEK    INR Location Clinic      Anticoagulation Summary as of 8/15/2018     INR goal 2.0-2.5   Today's INR 1.80! (8/14/2018)   Warfarin maintenance plan 20 mg (5 mg x 4) on Wed, Fri; 15 mg (5 mg x 3) all other days   Full warfarin instructions 20 mg on Wed, Fri; 15 mg all other days   Weekly warfarin total 115 mg   Plan last modified Demi Kebede, RN (8/15/2018)   Next INR check    Priority INR   Target end date Indefinite    Indications   Atrial fibrillation (H) [I48.91] [I48.91]  Long-term (current) use of anticoagulants [Z79.01] [Z79.01]         Anticoagulation Episode Summary     INR check location Clinic Lab    Preferred lab     Send INR reminders to [facility]    Comments Contact Patient at [phone]      Anticoagulation Care Providers     Provider Role Specialty Phone number    Percy Alcala MD  Cardiology [phone]            See the Encounter Report to view Anticoagulation Flowsheet and Dosing Calendar (Go to Encounters tab in chart review, and find the  Anticoagulation Therapy Visit)    Left message for patient with results and dosing recommendations. Asked patient to call back to report any missed doses, falls, signs and symptoms of bleeding or clotting, any changes in health, medication, or diet. Asked patient to call back with any questions or concerns.     Demi Kebede RN             Addendum 8/15/18 Pt called back reporting that she has been eating a lot more salads and she was wondering if this is the reason for this? Writer explained that this does have Vitamin K in it and can decrease the INR. Educated the importance of pt keeping this consistent or we will be having a yo yo effect with INR's. Pt communicated understanding. Recommend pt to take a 25mg dose tonight then go back to maintenance dose. Pt reports she will get another INR either on 8/21 or 8/22. Demi Kebede RN

## 2018-08-27 DIAGNOSIS — E11.65 TYPE 2 DIABETES MELLITUS WITH HYPERGLYCEMIA, WITH LONG-TERM CURRENT USE OF INSULIN (H): ICD-10-CM

## 2018-08-27 DIAGNOSIS — Z79.4 TYPE 2 DIABETES MELLITUS WITH HYPERGLYCEMIA, WITH LONG-TERM CURRENT USE OF INSULIN (H): ICD-10-CM

## 2018-08-28 ENCOUNTER — HOSPITAL ENCOUNTER (OUTPATIENT)
Dept: PHYSICAL THERAPY | Facility: CLINIC | Age: 43
Setting detail: THERAPIES SERIES
End: 2018-08-28
Attending: INTERNAL MEDICINE
Payer: MEDICARE

## 2018-08-28 DIAGNOSIS — E11.9 DIABETES MELLITUS, TYPE 2 (H): ICD-10-CM

## 2018-08-28 PROCEDURE — 40000718 ZZHC STATISTIC PT DEPARTMENT ORTHO VISIT: Performed by: PHYSICAL THERAPIST

## 2018-08-28 PROCEDURE — 97110 THERAPEUTIC EXERCISES: CPT | Mod: GP | Performed by: PHYSICAL THERAPIST

## 2018-08-29 NOTE — TELEPHONE ENCOUNTER
Last Office Visit : 2/28/18   Future Office visit: 10/11/18   30 DAY RF    OVERDUE RTC APPT. ( Return to Endocrine Clinic in 8 weeks)   Scheduling has been notified to contact the pt for appointment.

## 2018-09-02 ENCOUNTER — PRE VISIT (OUTPATIENT)
Dept: CARDIOLOGY | Facility: CLINIC | Age: 43
End: 2018-09-02

## 2018-09-02 DIAGNOSIS — I50.22 CHRONIC SYSTOLIC HEART FAILURE (H): Primary | ICD-10-CM

## 2018-09-04 ENCOUNTER — HOSPITAL ENCOUNTER (OUTPATIENT)
Dept: PHYSICAL THERAPY | Facility: CLINIC | Age: 43
Setting detail: THERAPIES SERIES
End: 2018-09-04
Attending: INTERNAL MEDICINE
Payer: MEDICARE

## 2018-09-04 PROCEDURE — 97110 THERAPEUTIC EXERCISES: CPT | Mod: GP | Performed by: PHYSICAL THERAPIST

## 2018-09-04 PROCEDURE — 40000718 ZZHC STATISTIC PT DEPARTMENT ORTHO VISIT: Performed by: PHYSICAL THERAPIST

## 2018-09-11 ENCOUNTER — OFFICE VISIT (OUTPATIENT)
Dept: CARDIOLOGY | Facility: CLINIC | Age: 43
End: 2018-09-11
Attending: NURSE PRACTITIONER
Payer: MEDICARE

## 2018-09-11 VITALS
WEIGHT: 293 LBS | SYSTOLIC BLOOD PRESSURE: 123 MMHG | BODY MASS INDEX: 45.99 KG/M2 | DIASTOLIC BLOOD PRESSURE: 82 MMHG | HEIGHT: 67 IN | HEART RATE: 88 BPM | OXYGEN SATURATION: 100 %

## 2018-09-11 DIAGNOSIS — I50.32 CHRONIC DIASTOLIC HEART FAILURE (H): ICD-10-CM

## 2018-09-11 DIAGNOSIS — I50.22 CHRONIC SYSTOLIC HEART FAILURE (H): ICD-10-CM

## 2018-09-11 DIAGNOSIS — I50.23 ACUTE ON CHRONIC SYSTOLIC HEART FAILURE (H): ICD-10-CM

## 2018-09-11 DIAGNOSIS — I48.91 ATRIAL FIBRILLATION (H): ICD-10-CM

## 2018-09-11 DIAGNOSIS — Z79.01 LONG-TERM (CURRENT) USE OF ANTICOAGULANTS: ICD-10-CM

## 2018-09-11 LAB
ANION GAP SERPL CALCULATED.3IONS-SCNC: 5 MMOL/L (ref 3–14)
BUN SERPL-MCNC: 20 MG/DL (ref 7–30)
CALCIUM SERPL-MCNC: 8.4 MG/DL (ref 8.5–10.1)
CHLORIDE SERPL-SCNC: 101 MMOL/L (ref 94–109)
CO2 SERPL-SCNC: 30 MMOL/L (ref 20–32)
CREAT SERPL-MCNC: 1.37 MG/DL (ref 0.52–1.04)
GFR SERPL CREATININE-BSD FRML MDRD: 42 ML/MIN/1.7M2
GLUCOSE SERPL-MCNC: 95 MG/DL (ref 70–99)
INR PPP: 2.27 (ref 0.86–1.14)
POTASSIUM SERPL-SCNC: 3.4 MMOL/L (ref 3.4–5.3)
SODIUM SERPL-SCNC: 136 MMOL/L (ref 133–144)

## 2018-09-11 PROCEDURE — 80048 BASIC METABOLIC PNL TOTAL CA: CPT | Performed by: INTERNAL MEDICINE

## 2018-09-11 PROCEDURE — 99214 OFFICE O/P EST MOD 30 MIN: CPT | Mod: ZP | Performed by: NURSE PRACTITIONER

## 2018-09-11 PROCEDURE — 85610 PROTHROMBIN TIME: CPT | Performed by: INTERNAL MEDICINE

## 2018-09-11 PROCEDURE — 36415 COLL VENOUS BLD VENIPUNCTURE: CPT | Performed by: INTERNAL MEDICINE

## 2018-09-11 PROCEDURE — G0463 HOSPITAL OUTPT CLINIC VISIT: HCPCS | Mod: ZF

## 2018-09-11 RX ORDER — METOPROLOL SUCCINATE 100 MG/1
100 TABLET, EXTENDED RELEASE ORAL DAILY
Qty: 90 TABLET | Refills: 3 | Status: SHIPPED | OUTPATIENT
Start: 2018-09-11 | End: 2018-12-30

## 2018-09-11 RX ORDER — METOLAZONE 2.5 MG/1
2.5 TABLET ORAL 2 TIMES DAILY
Qty: 60 TABLET | Refills: 1 | Status: SHIPPED | OUTPATIENT
Start: 2018-09-11 | End: 2018-12-05

## 2018-09-11 ASSESSMENT — PAIN SCALES - GENERAL: PAINLEVEL: NO PAIN (0)

## 2018-09-11 NOTE — PROGRESS NOTES
HPI:    is a 43 yr old female patient followed by Dr. Alcala for dilated cardiomyopathy, [place] [facility] [place] [facility] [place] and at that time, her atenolol was restarted due to rapid rate Afib. She returns today for follow up. She was last seen in clinic 6 weeks ago when Toprol and hydralazine was increased. She returns for follow up.    Pricilla reports intermittent episodes of volume overload. Last week, she had signficant orthopnea and PND. Was SOB with ADLs. In retrospect, she admits she may have missed some doses of metolazone. Fortunately, her symptoms have since improved. She denies acute chest pain, but endorses chest heaviness and palpitations when volume up. Occasional lightheadedness when active and SOB. No falls or syncope. Intermittent belly distention, occasionally extended to her lower extremities.     PAST MEDICAL HISTORY:  Past Medical History:   Diagnosis Date     A-fib (H) 2011    on coumadin since 1/13     Asthma     as a kid     Chest pain 2/1/2017     Chronic anticoagulation for a-fib 2/15/2013    INR's followed by coumadin clinic at      Diabetes mellitus (H) 2012     Diastolic heart failure 2/15/2013     Dilated cardiomyopathy (H) 1/8/2013     HTN (hypertension)      Hyperthyroidism     Graves, s/p I131 1/13, now on prednisone and methimazole     Morbid obesity (H)      Pulmonary embolism (H) 1/12    hospitalized in [place], on lovenox/coumadin for a few months but stopped, hypercoag w/u neg per pt     Sleep apnea     using CPAP       FAMILY HISTORY:  Family History   Problem Relation Age of Onset     Thyroid Disease Mother      Grave's D     Diabetes Mother      HEART DISEASE Mother      Cerebrovascular Disease Mother      dec. 56 yo     Hypertension Mother      HEART DISEASE Sister      Heart Murmur     Diabetes Sister      HEART DISEASE Father      dec in his 30s, MI     Psychotic Disorder Brother      Bipolar     Diabetes Brother      Thyroid Disease  Brother      Hyper Thyroid     HEART DISEASE Brother      Thyroid Disease Sister      Hyper Thyroid     Thyroid Disease Sister      Hyper Thyroid     Thyroid Disease Sister      Mental Health Problems     Thyroid Disease Maternal Aunt      Thyroid Disease Maternal Uncle      Cancer No family hx of      Glaucoma No family hx of      Macular Degeneration No family hx of        SOCIAL HISTORY:  Social History     Social History     Marital status: Single     Spouse name: N/A     Number of children: N/A     Years of education: N/A     Social History Main Topics     Smoking status: Light Tobacco Smoker     Packs/day: 0.25     Years: 13.00     Types: Cigarettes     Smokeless tobacco: Never Used      Comment: down to 5 cigs     Alcohol use No     Drug use: No     Sexual activity: Yes     Partners: Male     Birth control/ protection: Condom     Other Topics Concern     None     Social History Narrative    Single, no children        Gyn:        Last pap several years ago, no abnormal    No STIs            Patient is single.  She is no longer working.  She used to work in the area of customer service.  She is currently living with her sister in [place], [place].  She has no pets.  Patient has smoked a half pack of cigarettes a day for the past 10 plus years.  She states that she is down to 5 cigarettes a day with the aid of Wellbutrin.  She does not smoke cigars, pipes or chew tobacco.  She has 1 cup of coffee in the morning.  She does not drink alcohol.  Patient does not exercise.        CURRENT MEDICATIONS:  Current Outpatient Prescriptions on File Prior to Visit:  acetaminophen (TYLENOL) 325 MG tablet Take 2 tablets (650 mg) by mouth 3 times daily as needed for mild pain or fever (total acetaminophen dose should not exceed 3000 mg per day)   albuterol (PROAIR HFA, PROVENTIL HFA, VENTOLIN HFA) 108 (90 BASE) MCG/ACT inhaler Inhale 2 puffs into the lungs every 6 hours as needed.   bisacodyl (DULCOLAX) 10 MG  suppository Place 1 suppository (10 mg) rectally daily as needed for constipation   blood glucose (NO BRAND SPECIFIED) lancets standard USE TO CHECK  blood sugar fasting each am  prelunch and predinner daily OR AS DIRECTED Call clinic to schedule MD APPT.   blood glucose monitoring (NO BRAND SPECIFIED) meter device kit Use to test blood sugar 3 times daily or as directed.   blood glucose monitoring (NO BRAND SPECIFIED) test strip Use to test blood sugars 3 times daily or as directed   Blood Glucose Monitoring Suppl (IBG STAR) W/DEVICE KIT -PLEASE GIVE PATIENT A DEVICE HER INSURANCE WILL COVER-   buPROPion (WELLBUTRIN SR) 100 MG 12 hr tablet Take 1 tablet (100 mg) by mouth 2 times daily   capsaicin (ZOSTRIX) 0.025 % CREA Apply 1 g topically 3 times daily as needed   ciclopirox 8 % SOLN Externally apply topically daily To toenails.   clotrimazole (LOTRIMIN) 1 % cream Apply topically 2 times daily   colchicine (COLCYRS) 0.6 MG tablet Take 1-2 tablets (0.6-1.2 mg) by mouth daily as needed for moderate pain *Call for appointment [phone]. Over 12 months since last seen.   diclofenac (VOLTAREN) 1 % GEL topical gel Apply 4 grams to knees or 2 grams to hands four times daily using enclosed dosing card.   dulaglutide (TRULICITY) 1.5 MG/0.5ML pen Inject 1.5 mg Subcutaneous every 7 days   DULoxetine (CYMBALTA) 20 MG capsule Take 1 capsule (20 mg) by mouth 2 times daily   Efinaconazole 10 % SOLN Externally apply topically daily To toenails.   gabapentin (NEURONTIN) 300 MG capsule Take 2 capsules (600 mg) by mouth 2 times daily Needs PCP appt for further refills   hydrALAZINE (APRESOLINE) 25 MG tablet Take 50-75 mg by mouth Take 2 tabs (50 mg) in am, 2 tabs (50 mg) midday, and 3 tabs (75 mg) in pm daily    insulin glargine U-300 (TOUJEO) 300 UNIT/ML injection Inject 170 units SQ each am.   insulin pen needle (BD RIVER U/F) 32G X 4 MM Use 6 daily or as directed* Call clinic to schedule  MD APPT.   levothyroxine  (SYNTHROID/LEVOTHROID) 200 MCG tablet Take 1 tab Monday thru Saturday and 2 tabs on Sundays.   metolazone (ZAROXOLYN) 2.5 MG tablet Take 1 tablet (2.5 mg) by mouth 2 times daily Take 1/2 hour prior to torsemide   metoprolol succinate (TOPROL-XL) 50 MG 24 hr tablet Take 1 tablet (50 mg) by mouth At Bedtime   morphine (MS CONTIN) 15 MG 12 hr tablet Take 15 mg by mouth daily as needed   NOVOLOG FLEXPEN 100 UNIT/ML soln Inject 65 units with meals plus correction. Pt uses approx 190 units in 24 hrs.   nystatin (MYCOSTATIN) cream Apply topically 2 times daily To toenails   order for DME Left foot   order for DME Equipment being ordered:  6372-1000 $92 [id number], fasciitis, LG, night splint   order for DME Equipment being ordered: Challenger Wide walker if available - patient needs seat, basket and brakes.   ORDER FOR DME Use your CPAP device as directed by your provider. Pressure change to min 13 max 18cwp   oxyCODONE-acetaminophen (PERCOCET) 5-325 MG per tablet Take 1 tablet by mouth every 8 hours as needed for moderate to severe pain (try to limit use, no further prescriptions until seen in pain clinic)   polyethylene glycol (MIRALAX/GLYCOLAX) powder    potassium chloride SA (K-DUR/KLOR-CON M) 20 MEQ CR tablet TAKE TWO TABLETS BY MOUTH FOUR TIMES A DAY   senna (SENOKOT) 8.6 MG tablet Take 1 tablet by mouth 2 times daily   spironolactone (ALDACTONE) 50 MG tablet Take 1 tablet (50 mg) by mouth daily   topiramate (TOPAMAX) 25 MG tablet 25 mg at bedtime for 1 week, 50 mg at bedtime for 1 week and 75 mg daily at bedtime thereafter   torsemide (DEMADEX) 100 MG tablet Take 1 tablet (100 mg) by mouth 2 times daily   warfarin (COUMADIN) 5 MG tablet Take one to two tablets daily or as directed by the Coumadin Clinic   nicotine ([employer] NICOTINE) 14 MG/24HR patch 2h hr Place 1 patch onto the skin every 24 hours (Patient not taking: Reported on 7/25/2018)   Respiratory Therapy Supplies (NEBULIZER COMPRESSOR) KIT 1 Device 4 times  "daily as needed. (Patient not taking: Reported on 7/25/2018)   tiotropium (SPIRIVA HANDIHALER) 18 MCG inhalation capsule Inhale contents of one capsule daily. (Patient not taking: Reported on 7/25/2018)     No current facility-administered medications on file prior to visit.       ROS:   Constitutional: No fever, chills, or sweats. No weight gain/loss.   ENT: No visual disturbance, ear ache, epistaxis, sore throat.   Allergies/Immunologic: Negative.   Respiratory: No cough, hemoptysis.   Cardiovascular: As per HPI.   GI: No nausea, vomiting, hematemesis, melena, or hematochezia.   : No urinary frequency, dysuria, or hematuria.   Integument: Negative.   Psychiatric: Negative.   Neuro: Negative.   Endocrinology: Negative.   Musculoskeletal: Negative.    EXAM:  /82 (BP Location: Left arm, Patient Position: Chair, Cuff Size: Adult Regular)  Pulse 88  Ht 1.702 m (5' 7\")  Wt (!) 210.7 kg (464 lb 9.6 oz)  LMP  (LMP Unknown)  SpO2 100%  BMI 72.77 kg/m2  General: Obese female;  appears comfortable, alert and articulate; She gets SOB with getting on exam table  Eyes: anicteric sclera, EOMI  Neck: no adenopathy  Orophyarynx: moist mucosa, no lesions, dentition intact  Heart: regular, S1/S2, no murmur, gallop, rub, cannot see JVP  Lungs: clear, no rales or wheezing  Abdomen: obese; soft, non-tender, bowel sounds present, no hepatomegaly  Extremities: no clubbing, cyanosis or edema  Neurological: normal speech and affect, no gross motor deficits    Labs:  CMP RESULTS:  Lab Results   Component Value Date     08/14/2018    POTASSIUM 4.0 08/14/2018    CHLORIDE 101 08/14/2018    CO2 27 08/14/2018    ANIONGAP 8 08/14/2018     (H) 08/14/2018    BUN 18 08/14/2018    CR 1.03 08/14/2018    GFRESTIMATED 58 (L) 08/14/2018    GFRESTBLACK 70 08/14/2018    JUSTIN 8.9 08/14/2018    BILITOTAL 0.4 05/29/2018    ALBUMIN 3.6 05/29/2018    ALKPHOS 85 05/29/2018    ALT 20 05/29/2018    AST 12 05/29/2018      Zio Monitor " (4/18)  Predominant rhythm is sinus rhythm.   1 run of VT lasting 6 beats at a rate of 200 bpm  AF burdeno f 7%  AF with RVR episode associated with patient symptoms    [facility] (2/2017)  RA 13  PA 32/17, 23  PAW 25, 25, 20  CO Catarina: 7.3 (2.6)    Assessment and Plan:   Pricilla is a pleasant 43-year-old woman with NICM and systolic heart failure. Her volume status is variable and dependent on her adherence to her diuretic regimen. Today, she looks relatively good. We'll increase her Toprol to 100 mg daily. She'll follow up with Dr. Alcala in 6 weeks, CORE as needed in the meantime.     1. Chronic systolic heart failure secondary to NICM.    Stage C  NYHA Class IIIa  ACEi/ARB yes  BB yes - Toprol increased to 100 mg daily today  Aldosterone antagonist yes -- spironolactone 50 mg daily  SCD prophylaxis does not meet criteria for implant  % BiV pacing: N/A  Fluid status: stable today. We discussed avoiding missed doses and the possibility that we could reduce her overall diuretic regimen if she was more consistent     2. AFib: Anticoagulated. Rate control is marginal. Increasing Toprol today    3. Ckd: suspect due to diabetic nephropathy - Repeat labs Monday    5. Tobacco use: Continues smoking. Encouraged to quit and support offered.    6. HTN; improved. Also increase Toprol today    7. Morbid obesity - unchanged    8. DM: managed by PCP. Would consider addition of Jardiance given positive cardiovascular impact    9. Hypothyroidism: on Synthroid - has had radiation therapy.       20 minutes spent in direct care, >50% in counseling      CC  Patient Care Team:  Jamilah Bernal MD as PCP - General (Internal Medicine)  Percy Alcala MD as MD (Cardiology)  Silva Damon MD as MD (Internal Medicine)  Norman Jean DPM (Podiatry)  Zita Liao as Registered Nurse (Diabetes Education)  Steph Kim PA-C as Physician Assistant (Physician Assistant)  Delmi Orellana MD as MD  (Ophthalmology)  Nelly Miranda MD as MD (Ophthalmology)  Drea Rosas, RN as Clinic Care Coordinator (Bariatric)  Isela Archibald PA-C as Physician Assistant (Physician Assistant)  Tom Guardado MD as MD (Ophthalmology)  uRssel Vergara OD as MD (Optometry)  Ophelia Kenny, RN as Nurse Coordinator (Cardiology)  Kristen Cuello APRN CNP as Nurse Practitioner (Cardiology)  Ximena Carpenter NP as Nurse Practitioner (Cardiology)  Ximena Carpenter NP as Nurse Practitioner (Nurse Practitioner - Family)  Vladislav Samuel MD as MD (Ophthalmology)  Alexandru Wang, RN as Nurse Coordinator (Cardiology)  SELF, REFERRED

## 2018-09-11 NOTE — PATIENT INSTRUCTIONS
"You were seen today in the Cardiovascular Clinic at the [facility].     Cardiology Providers you saw during your visit: Ximena Carpenter NP     1. Please increase Toprol to 100 mg daily    2. Please take the metolazone and torsemide consistently. If you are tempted to adjust it, please contact us    3. Please return to see Dr. Alcala in 6 weeks    4. We will call you with any changes based on your labs    Please limit your fluid intake to 2 L (64 ounces) daily.  2 Liters a day = 8.5 cups, or 64 ounces.  Please limit your salt intake to 2 grams a day or less.     If you gain 2# in 24 hours or 5# in one week call Ophelia Kenny RN so we can adjust your medications as needed over the phone.     Please feel free to call me with any questions or concerns.       Ophelia Kenny RN BSN CHFN  [facility]  Cardiology Care Coordinator-Heart Failure Clinic     Questions and [phone].   First press #1 for the [place] and then press #3 for \"Medical Questions\" to reach us Cardiology Nurses.      On Call Cardiologist for after hours or on weekends: [phone]   option #4 and ask to speak to the on-call Cardiologist. Inform them you are a CORE/heart failure patient at the [facility].           If you need a medication refill please contact your pharmacy.  Please allow 3 business days for your refill to be completed.  _______________________________________________________  [facility]IN[facility] Cardiomyopathy, Optimization, Rehabilitation, Education   The [facility] CLINIC is a heart failure specialty clinic within the [facility] where you will work with specialized nurse practitioners dedicated to helping patients with heart failure carefully adjust medications, receive education, and learn who and when to call if symptoms develop. They specialize in helping you better understand your condition, slow the progression of your disease, improve " the length and quality of your life, help you detect future heart problems before they become life threatening, and avoid hospitalizations.  As always, thank you for trusting us with your health care needs!

## 2018-09-11 NOTE — MR AVS SNAPSHOT
"              After Visit Summary   2018    Pricilla Brown    MRN: [id number]           Patient Information     Date Of Birth          1975        Visit Information        Provider Department      2018 6:00 PM Ximena Carpenter NP [facility]        Today's Diagnoses     Acute on chronic systolic heart failure (H)        Chronic diastolic heart failure (H)          Care Instructions    You were seen today in the Cardiovascular Clinic at the [facility].     Cardiology Providers you saw during your visit: Ximena Carpneter NP     1. Please increase Toprol to 100 mg daily    2. Please take the metolazone and torsemide consistently. If you are tempted to adjust it, please contact us    3. Please return to see Dr. Alcala in 6 weeks    4. We will call you with any changes based on your labs    Please limit your fluid intake to 2 L (64 ounces) daily.  2 Liters a day = 8.5 cups, or 64 ounces.  Please limit your salt intake to 2 grams a day or less.     If you gain 2# in 24 hours or 5# in one week call Ophelia Kenny RN so we can adjust your medications as needed over the phone.     Please feel free to call me with any questions or concerns.       Ophelia Kenny RN BSN CHFN  [facility]  Cardiology Care Coordinator-Heart Failure Clinic     Questions and [phone].   First press #1 for the [facility] and then press #3 for \"Medical Questions\" to reach us Cardiology Nurses.      On Call Cardiologist for after hours or on weekends: [phone]   option #4 and ask to speak to the on-call Cardiologist. Inform them you are a CORE/heart failure patient at the [facility].           If you need a medication refill please contact your pharmacy.  Please allow 3 business days for your refill to be completed.  _______________________________________________________  C.O.R.E. CLINIC Cardiomyopathy, Optimization, Rehabilitation, Education   The C.O.R.E. CLINIC " is a heart failure specialty clinic within the [facility] where you will work with specialized nurse practitioners dedicated to helping patients with heart failure carefully adjust medications, receive education, and learn who and when to call if symptoms develop. They specialize in helping you better understand your condition, slow the progression of your disease, improve the length and quality of your life, help you detect future heart problems before they become life threatening, and avoid hospitalizations.  As always, thank you for trusting us with your health care needs!           Follow-ups after your visit        Additional Services     Follow-Up with Advanced Heart Failure Cardiologist                 Your next 10 appointments already scheduled     Sep 13, 2018 12:00 PM CDT   (Arrive by 11:45 AM)   Return Weight Management Visit with Velvet Mcallister RD   [facility] ([facility])    [address] [place] [address]   [phone]            Sep 13, 2018 12:45 PM CDT   (Arrive by 12:30 PM)   Return Visit with Santa Sam MD   [facility] ([facility])    [address] [place] [address]   [phone]            Sep 14, 2018 12:30 PM CDT   Return Visit with Norman Jean DPM   [facility] ([facility])    [address] [place] [address]   [phone]            Sep 21, 2018  1:00 PM CDT   Pool Treatment with Génesis Wheeler, PT   [facility] ([facility])    [address] [place] [address]   [phone]            Sep 28, 2018  1:00 PM CDT   Pool Treatment with Chantal Chavis, PT   [facility] ([facility])    [address] [place] [address]   [phone]             Oct 01, 2018  1:40 PM CDT   Pool Treatment with Génesis Wheeler, PT   [facility] ([facility])    [address]  [address]  [place] [address]   [phone]            Oct 04, 2018  1:30 PM CDT   (Arrive by 1:15 PM)   Return Weight Management Visit with Steph Kim PA-C   [facility] ([facility])    [address] [address]   [phone]            Oct 05, 2018  1:00 PM CDT   Pool Treatment with Génesis Wheeler, PT   [facility] ([facility])    [address]  [address]  [place] [address]   [phone]            Oct 08, 2018  1:40 PM CDT   Pool Treatment with Génesis Wheeler, PT   [facility] ([facility])    [address]  [place] [address]   [phone]            Oct 09, 2018 11:15 AM CDT   (Arrive by 11:00 AM)   Return Weight Management Visit with Silva Damon MD   [facility] ([facility])    [address] [place] [address]   [phone]              Future tests that were ordered for you today     Open Future Orders        Priority Expected Expires Ordered    Follow-Up with Advanced Heart Failure Cardiologist Routine 10/23/2018 11/19/2018 9/11/2018            Who to contact     If you have questions or need follow up information about today's clinic visit or your schedule please contact [facility] directly at [phone].  Normal or non-critical lab and imaging results will be communicated to you by MyChart, letter or phone within 4 business days after the clinic has received the results. If you do not hear from us within 7 days, please contact the clinic through MyChart or phone. If you have a critical or abnormal lab result, we will notify you by phone as soon as possible.  Submit refill  "requests through [employer] or call your pharmacy and they will forward the refill request to us. Please allow 3 business days for your refill to be completed.          Additional Information About Your Visit        [employer] [facility] Information     [employer] gives you secure access to your electronic health record. If you see a primary care provider, you can also send messages to your care team and make appointments. If you have questions, please call your primary care clinic.  If you do not have a primary care provider, please call [phone] and they will assist you.        Care EveryWhere ID     This is your Care EveryWhere ID. This could be used by other organizations to access your [facility] medical records  [id number]        Your Vitals Were     Pulse Height Last Period Pulse Oximetry BMI (Body Mass Index)       88 1.702 m (5' 7\") (LMP Unknown) 100% 72.77 kg/m2        Blood Pressure from Last 3 Encounters:   09/11/18 123/82   07/25/18 142/79   07/19/18 119/78    Weight from Last 3 Encounters:   09/11/18 (!) 210.7 kg (464 lb 9.6 oz)   07/25/18 (!) 212.4 kg (468 lb 3.2 oz)   07/19/18 (!) 210.5 kg (464 lb)              We Performed the Following     Follow-Up with [facility]          Today's Medication Changes          These changes are accurate as of 9/11/18  6:42 PM.  If you have any questions, ask your nurse or doctor.               These medicines have changed or have updated prescriptions.        Dose/Directions    metoprolol succinate 100 MG 24 hr tablet   Commonly known as:  TOPROL-XL   This may have changed:    - medication strength  - how much to take  - when to take this   Used for:  Chronic diastolic heart failure (H)   Changed by:  Ximena Carpenter, NP        Dose:  100 mg   Take 1 tablet (100 mg) by mouth daily   Quantity:  90 tablet   Refills:  3            Where to get your medicines      These medications were sent to [facility] [place], [place] - [address] [place] [address] [phone] " [facility] [address] [address]    Hours:  TRANSPLANT PHONE NUMBER [phone] Phone:  [phone]     metoprolol succinate 100 MG 24 hr tablet                Primary Care Provider Office Phone # Fax #    Jamilah Bernal [phone] [address] [facility] [address] [facility] [address]        Equal Access to Services     KIERSTEN DRIVER : Hadii aad ku hadasho Soomaali, waaxda luqadaha, qaybta kaalmada adeegyada, waxay idiin hayaan adeeg kharash la'aan ah. So [facility] [phone].    ATENCIÓN: Si habla espmanny, tiene a de la rosa disposición servicios gratuitos de asistencia lingüística. Llame al [phone].    We comply with applicable federal civil rights laws and [place] laws. We do not discriminate on the basis of race, color, national origin, age, disability, sex, sexual orientation, or gender identity.            Thank you!     Thank you for choosing [facility]  for your care. Our goal is always to provide you with excellent care. Hearing back from our patients is one way we can continue to improve our services. Please take a few minutes to complete the written survey that you may receive in the mail after your visit with us. Thank you!             Your Updated Medication List - Protect others around you: Learn how to safely use, store and throw away your medicines at www.disposemymeds.org.          This list is accurate as of 9/11/18  6:42 PM.  Always use your most recent med list.                   Brand Name Dispense Instructions for use Diagnosis    acetaminophen 325 MG tablet    TYLENOL    180 tablet    Take 2 tablets (650 mg) by mouth 3 times daily as needed for mild pain or fever (total acetaminophen dose should not exceed 3000 mg per day)    Neuropathic pain of lower extremity, unspecified laterality       albuterol 108 (90 Base) MCG/ACT inhaler    PROAIR HFA/PROVENTIL HFA/VENTOLIN HFA    1 Inhaler    Inhale 2 puffs into the lungs every 6 hours as needed.    Reactive  airway disease       bisacodyl 10 MG Suppository    DULCOLAX    6 suppository    Place 1 suppository (10 mg) rectally daily as needed for constipation    Constipation       blood glucose lancets standard    no brand specified    100 each    USE TO CHECK  blood sugar fasting each am  prelunch and predinner daily OR AS DIRECTED Call clinic to schedule MD APPT.    Diabetes mellitus, type 2 (H)       blood glucose monitoring test strip    no brand specified    100 strip    Use to test blood sugars 3 times daily or as directed    Diabetes mellitus, type 2 (H)       buPROPion 100 MG 12 hr tablet    WELLBUTRIN SR    180 tablet    Take 1 tablet (100 mg) by mouth 2 times daily    Tobacco abuse       capsaicin 0.025 % Crea cream    ZOSTRIX    1 Tube    Apply 1 g topically 3 times daily as needed    Dermatophytosis of nail       ciclopirox 8 % Soln     1 Bottle    Externally apply topically daily To toenails.    Dermatophytosis of nail       clotrimazole 1 % cream    LOTRIMIN    30 g    Apply topically 2 times daily    Tinea pedis of right foot       colchicine 0.6 MG tablet    COLCYRS    30 tablet    Take 1-2 tablets (0.6-1.2 mg) by mouth daily as needed for moderate pain *Call for appointment [phone]. Over 12 months since last seen.    Acute gouty arthritis       diclofenac 1 % Gel topical gel    VOLTAREN    100 g    Apply 4 grams to knees or 2 grams to hands four times daily using enclosed dosing card.    Left foot pain       dulaglutide 1.5 MG/0.5ML pen    TRULICITY    45 mL    Inject 1.5 mg Subcutaneous every 7 days    Type 2 diabetes mellitus with hyperglycemia, with long-term current use of insulin (H)       DULoxetine 20 MG EC capsule    CYMBALTA    60 capsule    Take 1 capsule (20 mg) by mouth 2 times daily    Depression       Efinaconazole 10 % Soln     8 mL    Externally apply topically daily To toenails.    Dermatophytosis of nail       gabapentin 300 MG capsule    NEURONTIN    120 capsule    Take 2 capsules  (600 mg) by mouth 2 times daily Needs PCP appt for further refills    Type 2 diabetes mellitus with hyperglycemia, with long-term current use of insulin (H)       hydrALAZINE 25 MG tablet    APRESOLINE    450 tablet    Take 50-75 mg by mouth Take 2 tabs (50 mg) in am, 2 tabs (50 mg) midday, and 3 tabs (75 mg) in pm daily    Dilated cardiomyopathy (H)       * iBG star w/Device Kit     1 kit    -PLEASE GIVE PATIENT A DEVICE HER INSURANCE WILL COVER-    Type 2 diabetes mellitus with hyperglycemia, with long-term current use of insulin (H)       * blood glucose monitoring meter device kit    no brand specified    1 kit    Use to test blood sugar 3 times daily or as directed.    Diabetes mellitus, type 2 (H)       insulin glargine U-300 300 UNIT/ML injection    TOUJEO    30 mL    Inject 170 units SQ each am.    Type 2 diabetes mellitus with hyperglycemia, with long-term current use of insulin (H)       insulin pen needle 32G X 4 MM    BD RIVER U/F    200 each    Use 6 daily or as directed* Call clinic to schedule  MD APPT.    Diabetes mellitus, type 2 (H)       levothyroxine 200 MCG tablet    SYNTHROID/LEVOTHROID    102 tablet    Take 1 tab Monday thru Saturday and 2 tabs on Sundays.    Other specified hypothyroidism       metolazone 2.5 MG tablet    ZAROXOLYN    30 tablet    Take 1 tablet (2.5 mg) by mouth 2 times daily Take 1/2 hour prior to torsemide    Acute on chronic systolic heart failure (H)       metoprolol succinate 100 MG 24 hr tablet    TOPROL-XL    90 tablet    Take 1 tablet (100 mg) by mouth daily    Chronic diastolic heart failure (H)       morphine 15 MG 12 hr tablet    MS CONTIN     Take 15 mg by mouth daily as needed        Nebulizer Compressor Kit     1 kit    1 Device 4 times daily as needed.    COPD (chronic obstructive pulmonary disease) (H)       nicotine 14 MG/24HR 24 hr patch    [employer] NICOTINE    30 patch    Place 1 patch onto the skin every 24 hours    Tobacco abuse       NovoLOG FLEXPEN 100  UNIT/ML injection   Generic drug:  insulin aspart     180 mL    Inject 65 units with meals plus correction. Pt uses approx 190 units in 24 hrs.    Type 2 diabetes mellitus with hyperglycemia, with long-term current use of insulin (H)       nystatin cream    MYCOSTATIN    90 g    Apply topically 2 times daily To toenails    Dermatophytosis of nail       order for DME     1 each    Use your CPAP device as directed by your provider. Pressure change to min 13 max 18cwp        * order for DME     1 each    Equipment being ordered: Challenger Wide walker if available - patient needs seat, basket and brakes.    Dilated cardiomyopathy (H), Chronic systolic heart failure (H)       * order for DME     1 each    Equipment being ordered: BI [id number] $92  Plantar, fasciitis, LG, night splint    Dermatophytosis of nail, Plantar fasciitis of right foot, Diabetic neuropathy with neurologic complication (H), Peroneal tendinitis, right       * order for DME     1 Units    Left foot    Left foot pain, Diabetic neuropathy with neurologic complication (H)       oxyCODONE-acetaminophen 5-325 MG per tablet    PERCOCET    60 tablet    Take 1 tablet by mouth every 8 hours as needed for moderate to severe pain (try to limit use, no further prescriptions until seen in pain clinic)    Lumbago, Bilateral low back pain with sciatica, sciatica laterality unspecified       polyethylene glycol powder    MIRALAX/GLYCOLAX          potassium chloride SA 20 MEQ CR tablet    K-DUR/KLOR-CON M    240 tablet    TAKE TWO TABLETS BY MOUTH FOUR TIMES A DAY    Hypokalemia       senna 8.6 MG tablet    SENOKOT    45 tablet    Take 1 tablet by mouth 2 times daily    Constipation       spironolactone 50 MG tablet    ALDACTONE    30 tablet    Take 1 tablet (50 mg) by mouth daily        tiotropium 18 MCG capsule    SPIRIVA HANDIHALER    30 capsule    Inhale contents of one capsule daily.    COPD (chronic obstructive pulmonary disease) (H)       topiramate 25  MG tablet    TOPAMAX    360 tablet    25 mg at bedtime for 1 week, 50 mg at bedtime for 1 week and 75 mg daily at bedtime thereafter    Morbid obesity (H)       torsemide 100 MG tablet    DEMADEX    180 tablet    Take 1 tablet (100 mg) by mouth 2 times daily    Chronic diastolic heart failure (H)       warfarin 5 MG tablet    COUMADIN    90 tablet    Take one to two tablets daily or as directed by the Coumadin Clinic    Atrial fibrillation, unspecified type (H), Long-term (current) use of anticoagulants       * Notice:  This list has 5 medication(s) that are the same as other medications prescribed for you. Read the directions carefully, and ask your doctor or other care provider to review them with you.

## 2018-09-11 NOTE — NURSING NOTE
Vitals completed successfully and medication reconciled. Rajesh Branch MA  Chief Complaint   Patient presents with     Follow Up For     Return CORE; 43yr old female with a h/o chronic systolic heart failure presenting for follow-up with labs prior

## 2018-09-11 NOTE — LETTER
9/11/2018      RE: Pricilla Brown  [address]  [facility] [address]       Dear Colleague,    Thank you for the opportunity to participate in the care of your patient, Pricilla Brown, at the [facility] at [facility]. Please see a copy of my visit note below.    HPI:    is a 43 yr old female patient followed by Dr. Alcala for dilated cardiomyopathy, presents for follow up to [facility]. She was seen 1 week ago in [facility] and at that time, her atenolol was restarted due to rapid rate Afib. She returns today for follow up. She was last seen in clinic 6 weeks ago when Toprol and hydralazine was increased. She returns for follow up.    Pricilla reports intermittent episodes of volume overload. Last week, she had signficant orthopnea and PND. Was SOB with ADLs. In retrospect, she admits she may have missed some doses of metolazone. Fortunately, her symptoms have since improved. She denies acute chest pain, but endorses chest heaviness and palpitations when volume up. Occasional lightheadedness when active and SOB. No falls or syncope. Intermittent belly distention, occasionally extended to her lower extremities.     PAST MEDICAL HISTORY:  Past Medical History:   Diagnosis Date     A-fib (H) 2011    on coumadin since 1/13     Asthma     as a kid     Chest pain 2/1/2017     Chronic anticoagulation for a-fib 2/15/2013    INR's followed by coumadin clinic at      Diabetes mellitus (H) 2012     Diastolic heart failure 2/15/2013     Dilated cardiomyopathy (H) 1/8/2013     HTN (hypertension)      Hyperthyroidism     Graves, s/p I131 1/13, now on prednisone and methimazole     Morbid obesity (H)      Pulmonary embolism (H) 1/12    hospitalized in [place], on lovenox/coumadin for a few months but stopped, hypercoag w/u neg per pt     Sleep apnea     using CPAP       FAMILY HISTORY:  Family History   Problem Relation Age of Onset     Thyroid Disease Mother      Grave's D      Diabetes Mother      HEART DISEASE Mother      Cerebrovascular Disease Mother      dec. 54 yo     Hypertension Mother      HEART DISEASE Sister      Heart Murmur     Diabetes Sister      HEART DISEASE Father      dec in his 30s, MI     Psychotic Disorder Brother      Bipolar     Diabetes Brother      Thyroid Disease Brother      Hyper Thyroid     HEART DISEASE Brother      Thyroid Disease Sister      Hyper Thyroid     Thyroid Disease Sister      Hyper Thyroid     Thyroid Disease Sister      Mental Health Problems     Thyroid Disease Maternal Aunt      Thyroid Disease Maternal Uncle      Cancer No family hx of      Glaucoma No family hx of      Macular Degeneration No family hx of        SOCIAL HISTORY:  Social History     Social History     Marital status: Single     Spouse name: N/A     Number of children: N/A     Years of education: N/A     Social History Main Topics     Smoking status: Light Tobacco Smoker     Packs/day: 0.25     Years: 13.00     Types: Cigarettes     Smokeless tobacco: Never Used      Comment: down to 5 cigs     Alcohol use No     Drug use: No     Sexual activity: Yes     Partners: Male     Birth control/ protection: Condom     Other Topics Concern     None     Social History Narrative    Single, no children        Gyn:        Last pap several years ago, no abnormal    No STIs            Patient is single.  She is no longer working.  She used to work in the area of customer service.  She is currently living with her sister in [place], [place].  She has no pets.  Patient has smoked a half pack of cigarettes a day for the past 10 plus years.  She states that she is down to 5 cigarettes a day with the aid of Wellbutrin.  She does not smoke cigars, pipes or chew tobacco.  She has 1 cup of coffee in the morning.  She does not drink alcohol.  Patient does not exercise.        CURRENT MEDICATIONS:  Current Outpatient Prescriptions on File Prior to Visit:  acetaminophen (TYLENOL) 325 MG  tablet Take 2 tablets (650 mg) by mouth 3 times daily as needed for mild pain or fever (total acetaminophen dose should not exceed 3000 mg per day)   albuterol (PROAIR HFA, PROVENTIL HFA, VENTOLIN HFA) 108 (90 BASE) MCG/ACT inhaler Inhale 2 puffs into the lungs every 6 hours as needed.   bisacodyl (DULCOLAX) 10 MG suppository Place 1 suppository (10 mg) rectally daily as needed for constipation   blood glucose (NO BRAND SPECIFIED) lancets standard USE TO CHECK  blood sugar fasting each am  prelunch and predinner daily OR AS DIRECTED Call clinic to schedule MD APPT.   blood glucose monitoring (NO BRAND SPECIFIED) meter device kit Use to test blood sugar 3 times daily or as directed.   blood glucose monitoring (NO BRAND SPECIFIED) test strip Use to test blood sugars 3 times daily or as directed   Blood Glucose Monitoring Suppl (IBG STAR) W/DEVICE KIT -PLEASE GIVE PATIENT A DEVICE HER INSURANCE WILL COVER-   buPROPion (WELLBUTRIN SR) 100 MG 12 hr tablet Take 1 tablet (100 mg) by mouth 2 times daily   capsaicin (ZOSTRIX) 0.025 % CREA Apply 1 g topically 3 times daily as needed   ciclopirox 8 % SOLN Externally apply topically daily To toenails.   clotrimazole (LOTRIMIN) 1 % cream Apply topically 2 times daily   colchicine (COLCYRS) 0.6 MG tablet Take 1-2 tablets (0.6-1.2 mg) by mouth daily as needed for moderate pain *Call for appointment [phone]. Over 12 months since last seen.   diclofenac (VOLTAREN) 1 % GEL topical gel Apply 4 grams to knees or 2 grams to hands four times daily using enclosed dosing card.   dulaglutide (TRULICITY) 1.5 MG/0.5ML pen Inject 1.5 mg Subcutaneous every 7 days   DULoxetine (CYMBALTA) 20 MG capsule Take 1 capsule (20 mg) by mouth 2 times daily   Efinaconazole 10 % SOLN Externally apply topically daily To toenails.   gabapentin (NEURONTIN) 300 MG capsule Take 2 capsules (600 mg) by mouth 2 times daily Needs PCP appt for further refills   hydrALAZINE (APRESOLINE) 25 MG tablet Take 50-75 mg  by mouth Take 2 tabs (50 mg) in am, 2 tabs (50 mg) midday, and 3 tabs (75 mg) in pm daily    insulin glargine U-300 (TOUJEO) 300 UNIT/ML injection Inject 170 units SQ each am.   insulin pen needle (BD RIVER U/F) 32G X 4 MM Use 6 daily or as directed* Call clinic to schedule  MD APPT.   levothyroxine (SYNTHROID/LEVOTHROID) 200 MCG tablet Take 1 tab Monday thru Saturday and 2 tabs on Sundays.   metolazone (ZAROXOLYN) 2.5 MG tablet Take 1 tablet (2.5 mg) by mouth 2 times daily Take 1/2 hour prior to torsemide   metoprolol succinate (TOPROL-XL) 50 MG 24 hr tablet Take 1 tablet (50 mg) by mouth At Bedtime   morphine (MS CONTIN) 15 MG 12 hr tablet Take 15 mg by mouth daily as needed   NOVOLOG FLEXPEN 100 UNIT/ML soln Inject 65 units with meals plus correction. Pt uses approx 190 units in 24 hrs.   nystatin (MYCOSTATIN) cream Apply topically 2 times daily To toenails   order for DME Left foot   order for DME Equipment being ordered: [id number] $92 [id number], fasciitis, LG, night splint   order for DME Equipment being ordered: Challenger Herminia walker if available - patient needs seat, basket and brakes.   ORDER FOR DME Use your CPAP device as directed by your provider. Pressure change to min 13 max 18cwp   oxyCODONE-acetaminophen (PERCOCET) 5-325 MG per tablet Take 1 tablet by mouth every 8 hours as needed for moderate to severe pain (try to limit use, no further prescriptions until seen in pain clinic)   polyethylene glycol (MIRALAX/GLYCOLAX) powder    potassium chloride SA (K-DUR/KLOR-CON M) 20 MEQ CR tablet TAKE TWO TABLETS BY MOUTH FOUR TIMES A DAY   senna (SENOKOT) 8.6 MG tablet Take 1 tablet by mouth 2 times daily   spironolactone (ALDACTONE) 50 MG tablet Take 1 tablet (50 mg) by mouth daily   topiramate (TOPAMAX) 25 MG tablet 25 mg at bedtime for 1 week, 50 mg at bedtime for 1 week and 75 mg daily at bedtime thereafter   torsemide (DEMADEX) 100 MG tablet Take 1 tablet (100 mg) by mouth 2 times daily   warfarin  "(COUMADIN) 5 MG tablet Take one to two tablets daily or as directed by the Coumadin Clinic   nicotine ([employer] NICOTINE) 14 MG/24HR patch 2h hr Place 1 patch onto the skin every 24 hours (Patient not taking: Reported on 7/25/2018)   Respiratory Therapy Supplies (NEBULIZER COMPRESSOR) KIT 1 Device 4 times daily as needed. (Patient not taking: Reported on 7/25/2018)   tiotropium (SPIRIVA HANDIHALER) 18 MCG inhalation capsule Inhale contents of one capsule daily. (Patient not taking: Reported on 7/25/2018)     No current facility-administered medications on file prior to visit.       ROS:   Constitutional: No fever, chills, or sweats. No weight gain/loss.   ENT: No visual disturbance, ear ache, epistaxis, sore throat.   Allergies/Immunologic: Negative.   Respiratory: No cough, hemoptysis.   Cardiovascular: As per HPI.   GI: No nausea, vomiting, hematemesis, melena, or hematochezia.   : No urinary frequency, dysuria, or hematuria.   Integument: Negative.   Psychiatric: Negative.   Neuro: Negative.   Endocrinology: Negative.   Musculoskeletal: Negative.    EXAM:  /82 (BP Location: Left arm, Patient Position: Chair, Cuff Size: Adult Regular)  Pulse 88  Ht 1.702 m (5' 7\")  Wt (!) 210.7 kg (464 lb 9.6 oz)  LMP  (LMP Unknown)  SpO2 100%  BMI 72.77 kg/m2  General: Obese female;  appears comfortable, alert and articulate; She gets SOB with getting on exam table  Eyes: anicteric sclera, EOMI  Neck: no adenopathy  Orophyarynx: moist mucosa, no lesions, dentition intact  Heart: regular, S1/S2, no murmur, gallop, rub, cannot see JVP  Lungs: clear, no rales or wheezing  Abdomen: obese; soft, non-tender, bowel sounds present, no hepatomegaly  Extremities: no clubbing, cyanosis or edema  Neurological: normal speech and affect, no gross motor deficits    Labs:  CMP RESULTS:  Lab Results   Component Value Date     08/14/2018    POTASSIUM 4.0 08/14/2018    CHLORIDE 101 08/14/2018    CO2 27 08/14/2018    ANIONGAP 8 " 08/14/2018     (H) 08/14/2018    BUN 18 08/14/2018    CR 1.03 08/14/2018    GFRESTIMATED 58 (L) 08/14/2018    GFRESTBLACK 70 08/14/2018    JUSTIN 8.9 08/14/2018    BILITOTAL 0.4 05/29/2018    ALBUMIN 3.6 05/29/2018    ALKPHOS 85 05/29/2018    ALT 20 05/29/2018    AST 12 05/29/2018      Zio Monitor (4/18)  Predominant rhythm is sinus rhythm.   1 run of VT lasting 6 beats at a rate of 200 bpm  AF burdeno f 7%  AF with RVR episode associated with patient symptoms    RHC (2/2017)  RA 13  PA 32/17, 23  PAW 25, 25, 20  CO Catarina: 7.3 (2.6)    Assessment and Plan:   Pricilla is a pleasant 43-year-old woman with NICM and systolic heart failure. Her volume status is variable and dependent on her adherence to her diuretic regimen. Today, she looks relatively good. We'll increase her Toprol to 100 mg daily. She'll follow up with Dr. Alcala in 6 weeks, CORE as needed in the meantime.     1. Chronic systolic heart failure secondary to NICM.    Stage C  NYHA Class IIIa  ACEi/ARB yes  BB yes - Toprol increased to 100 mg daily today  Aldosterone antagonist yes -- spironolactone 50 mg daily  SCD prophylaxis does not meet criteria for implant  % BiV pacing: N/A  Fluid status: stable today. We discussed avoiding missed doses and the possibility that we could reduce her overall diuretic regimen if she was more consistent     2. AFib: Anticoagulated. Rate control is marginal. Increasing Toprol today    3. Ckd: suspect due to diabetic nephropathy - Repeat labs Monday    5. Tobacco use: Continues smoking. Encouraged to quit and support offered.    6. HTN; improved. Also increase Toprol today    7. Morbid obesity - unchanged    8. DM: managed by PCP. Would consider addition of Jardiance given positive cardiovascular impact    9. Hypothyroidism: on Synthroid - has had radiation therapy.       20 minutes spent in direct care, >50% in counseling      CC  Patient Care Team:  Jamilah Bernal MD as PCP - General (Internal  Medicine)  Percy Alcala MD as MD (Cardiology)  Silva Damon MD as MD (Internal Medicine)  Norman Jean DPM (Podiatry)  Zita Liao as Registered Nurse (Diabetes Education)  Judy, Steph Hope PA-C as Physician Assistant (Physician Assistant)  Delmi Orellana MD as MD (Ophthalmology)  Ruben, Nelly Rogers MD as MD (Ophthalmology)  Drea Rosas, RN as Clinic Care Coordinator (Bariatric)  Isela Archibald PA-C as Physician Assistant (Physician Assistant)  Tom Guardado MD as MD (Ophthalmology)  Russel Vergara OD as MD (Optometry)  Ophelia Kenny, RN as Nurse Coordinator (Cardiology)  Kristen Cuello APRN CNP as Nurse Practitioner (Cardiology)  Ximena Carpenter NP as Nurse Practitioner (Cardiology)  Ximena Carpenter NP as Nurse Practitioner (Nurse Practitioner - Family)  Vladislav Samuel MD as MD (Ophthalmology)  Alexandru Wang RN as Nurse Coordinator (Cardiology)

## 2018-09-12 ENCOUNTER — TELEPHONE (OUTPATIENT)
Dept: SLEEP MEDICINE | Facility: CLINIC | Age: 43
End: 2018-09-12

## 2018-09-12 ENCOUNTER — ANTICOAGULATION THERAPY VISIT (OUTPATIENT)
Dept: ANTICOAGULATION | Facility: CLINIC | Age: 43
End: 2018-09-12

## 2018-09-12 DIAGNOSIS — Z79.01 LONG-TERM (CURRENT) USE OF ANTICOAGULANTS: ICD-10-CM

## 2018-09-12 DIAGNOSIS — I48.91 ATRIAL FIBRILLATION, UNSPECIFIED TYPE (H): ICD-10-CM

## 2018-09-12 NOTE — TELEPHONE ENCOUNTER
Patient calling inquiring about [employer] form that was sent for provider to complete and return to [employer].     Patient was expecting call back from So by end of day today.     Informed patient that So left for the day.     Informed patient that Alecia will be in the office tomorrow Thursday 9/13/18.     Patient stated she will call tomorrow afternoon to check on the status of the excel form. Patient states this needs to be done by this Friday 9/14/18.    Patient agreeable with this.

## 2018-09-12 NOTE — TELEPHONE ENCOUNTER
Received Critical life- sustaining medical equipment and emergency forms on 9/12/2018. Forms placed in providers box to sign.    So [facility]

## 2018-09-12 NOTE — PROGRESS NOTES
ANTICOAGULATION FOLLOW-UP CLINIC VISIT    Patient Name:  Pricilla Brown  Date:  9/12/2018  Contact Type:  Telephone    SUBJECTIVE:     Patient Findings     Positives No Problem Findings           OBJECTIVE    INR   Date Value Ref Range Status   09/11/2018 2.27 (H) 0.86 - 1.14 Final     Chromogenic Factor 10   Date Value Ref Range Status   04/08/2017 19 (L) 70 - 130 % Final     Comment:     Therapeutic Range:  A Chromogenic Factor 10 level of approximately 20-40%   inversely correlates with an INR of 2-3 for patients receiving Warfarin.   Chromogenic Factor 10 levels below 20% indicate an INR greater than 3 and   levels above 40% indicate an INR less than 2.         ASSESSMENT / PLAN  INR assessment THER    Recheck INR In: 1 WEEK    INR Location Clinic      Anticoagulation Summary as of 9/12/2018     INR goal 2.0-2.5   Today's INR 2.27 (9/11/2018)   Warfarin maintenance plan 20 mg (5 mg x 4) on Wed, Fri; 15 mg (5 mg x 3) all other days   Full warfarin instructions 20 mg on Wed, Fri; 15 mg all other days   Weekly warfarin total 115 mg   No change documented Velvet Khan RN   Plan last modified Demi Kebede RN (8/15/2018)   Next INR check 9/20/2018   Priority INR   Target end date Indefinite    Indications   Atrial fibrillation (H) [I48.91] [I48.91]  Long-term (current) use of anticoagulants [Z79.01] [Z79.01]         Anticoagulation Episode Summary     INR check location Clinic Lab    Preferred lab     Send INR reminders to [facility]    Comments Contact Patient at [phone]      Anticoagulation Care Providers     Provider Role Specialty Phone number    Percy Alcala MD  Cardiology [phone]            See the Encounter Report to view Anticoagulation Flowsheet and Dosing Calendar (Go to Encounters tab in chart review, and find the Anticoagulation Therapy Visit)    Spoke with Pricilla.  She reports no changes in health, diet, medications.    Velvet Khan RN

## 2018-09-12 NOTE — MR AVS SNAPSHOT
Pricilla Brown   9/12/2018   Anticoagulation Therapy Visit    Description:  43 year old female   Provider:  Velvet Khan, RN   Department:  [facility]           INR as of 9/12/2018     Today's INR 2.27 (9/11/2018)      Anticoagulation Summary as of 9/12/2018     INR goal 2.0-2.5   Today's INR 2.27 (9/11/2018)   Full warfarin instructions 20 mg on Wed, Fri; 15 mg all other days   Next INR check 9/20/2018    Indications   Atrial fibrillation (H) [I48.91] [I48.91]  Long-term (current) use of anticoagulants [Z79.01] [Z79.01]         September 2018 Details    Sun Mon Tue Wed Thu Fri Sat           1                 2               3               4               5               6               7               8                 9               10               11               12      20 mg   See details      13      15 mg         14      20 mg         15      15 mg           16      15 mg         17      15 mg         18      15 mg         19      20 mg         20            21               22                 23               24               25               26               27               28               29                 30                      Date Details   09/12 This INR check       Date of next INR:  9/20/2018         How to take your warfarin dose     To take:  15 mg Take 3 of the 5 mg tablets.    To take:  20 mg Take 4 of the 5 mg tablets.

## 2018-09-13 ENCOUNTER — DOCUMENTATION ONLY (OUTPATIENT)
Dept: SLEEP MEDICINE | Facility: CLINIC | Age: 43
End: 2018-09-13

## 2018-09-20 ENCOUNTER — ANTICOAGULATION THERAPY VISIT (OUTPATIENT)
Dept: ANTICOAGULATION | Facility: CLINIC | Age: 43
End: 2018-09-20

## 2018-09-20 ENCOUNTER — OFFICE VISIT (OUTPATIENT)
Dept: INTERNAL MEDICINE | Facility: CLINIC | Age: 43
End: 2018-09-20
Payer: MEDICARE

## 2018-09-20 ENCOUNTER — OFFICE VISIT (OUTPATIENT)
Dept: SURGERY | Facility: CLINIC | Age: 43
End: 2018-09-20
Payer: MEDICARE

## 2018-09-20 VITALS
BODY MASS INDEX: 71.42 KG/M2 | OXYGEN SATURATION: 100 % | HEART RATE: 85 BPM | WEIGHT: 293 LBS | DIASTOLIC BLOOD PRESSURE: 82 MMHG | SYSTOLIC BLOOD PRESSURE: 135 MMHG

## 2018-09-20 DIAGNOSIS — Z01.419 ENCOUNTER FOR GYNECOLOGICAL EXAMINATION WITHOUT ABNORMAL FINDING: ICD-10-CM

## 2018-09-20 DIAGNOSIS — E66.01 MORBID OBESITY (H): ICD-10-CM

## 2018-09-20 DIAGNOSIS — I48.91 ATRIAL FIBRILLATION (H): ICD-10-CM

## 2018-09-20 DIAGNOSIS — Z71.6 ENCOUNTER FOR SMOKING CESSATION COUNSELING: Primary | ICD-10-CM

## 2018-09-20 DIAGNOSIS — Z00.00 VISIT FOR PREVENTIVE HEALTH EXAMINATION: ICD-10-CM

## 2018-09-20 DIAGNOSIS — L02.221 FURUNCLE OF ABDOMINAL WALL: ICD-10-CM

## 2018-09-20 DIAGNOSIS — F17.200 TOBACCO USE DISORDER: ICD-10-CM

## 2018-09-20 DIAGNOSIS — Z11.3 SCREENING EXAMINATION FOR VENEREAL DISEASE: ICD-10-CM

## 2018-09-20 DIAGNOSIS — I48.91 ATRIAL FIBRILLATION, UNSPECIFIED TYPE (H): ICD-10-CM

## 2018-09-20 DIAGNOSIS — Z12.4 SCREENING FOR MALIGNANT NEOPLASM OF CERVIX: ICD-10-CM

## 2018-09-20 DIAGNOSIS — Z79.01 LONG-TERM (CURRENT) USE OF ANTICOAGULANTS: ICD-10-CM

## 2018-09-20 LAB — INR PPP: 4.07 (ref 0.86–1.14)

## 2018-09-20 PROCEDURE — 87624 HPV HI-RISK TYP POOLED RSLT: CPT | Performed by: INTERNAL MEDICINE

## 2018-09-20 PROCEDURE — 87591 N.GONORRHOEAE DNA AMP PROB: CPT | Performed by: INTERNAL MEDICINE

## 2018-09-20 PROCEDURE — 87491 CHLMYD TRACH DNA AMP PROBE: CPT | Performed by: INTERNAL MEDICINE

## 2018-09-20 PROCEDURE — G0145 SCR C/V CYTO,THINLAYER,RESCR: HCPCS | Performed by: INTERNAL MEDICINE

## 2018-09-20 PROCEDURE — G0476 HPV COMBO ASSAY CA SCREEN: HCPCS | Performed by: INTERNAL MEDICINE

## 2018-09-20 RX ORDER — CLOTRIMAZOLE 1 %
CREAM (GRAM) TOPICAL 2 TIMES DAILY PRN
Qty: 60 G | Refills: 1 | Status: SHIPPED | OUTPATIENT
Start: 2018-09-20 | End: 2018-12-30

## 2018-09-20 ASSESSMENT — PAIN SCALES - GENERAL: PAINLEVEL: NO PAIN (0)

## 2018-09-20 NOTE — MR AVS SNAPSHOT
Pricilla Brown   9/20/2018   Anticoagulation Therapy Visit    Description:  43 year old female   Provider:  Kristen Rios, RN   Department:  [facility]           INR as of 9/20/2018     Today's INR 4.07!      Anticoagulation Summary as of 9/20/2018     INR goal 2.0-2.5   Today's INR 4.07!   Full warfarin instructions 9/20: 7.5 mg; Otherwise 20 mg on Wed, Fri; 15 mg all other days   Next INR check 9/28/2018    Indications   Atrial fibrillation (H) [I48.91] [I48.91]  Long-term (current) use of anticoagulants [Z79.01] [Z79.01]         September 2018 Details    Sun Mon Tue Wed Thu Fri Sat           1                 2               3               4               5               6               7               8                 9               10               11               12               13               14               15                 16               17               18               19               20      7.5 mg   See details      21      20 mg         22      15 mg           23      15 mg         24      15 mg         25      15 mg         26      20 mg         27      15 mg         28            29                 30                      Date Details   09/20 This INR check       Date of next INR:  9/28/2018         How to take your warfarin dose     To take:  7.5 mg Take 1.5 of the 5 mg tablets.    To take:  15 mg Take 3 of the 5 mg tablets.    To take:  20 mg Take 4 of the 5 mg tablets.

## 2018-09-20 NOTE — PROGRESS NOTES
PRIMARY CARE CENTER       SUBJECTIVE:  Pricilla Brown is a 43 year old female with an extensive PMHx who comes in for smoking cessation and needs a Pap.     Has tried Chantix, nicotine patches/gums. Wellbutrin helps make the cigarettes taste funky, but does not take away the craving. Really wants to quit smoking. Is working toward bariatric surgery, weight is coming down, and feels very motivated to drop the smoking habit as well. Currently smoking about 8-10 cigarettes per day.     Last Pap in our system 3/2013 and was NIL. No HPV co-testing done at that time.     Medications and allergies reviewed by me today.     ROS:   Constitutional, HEENT, cardiovascular, pulmonary, gi and gu systems are negative, except as otherwise noted.    OBJECTIVE:    /82  Pulse 85  Wt (!) 206.8 kg (456 lb)  LMP  (LMP Unknown)  SpO2 100%  BMI 71.42 kg/m2   Wt Readings from Last 1 Encounters:   09/20/18 (!) 206.8 kg (456 lb)       GENERAL APPEARANCE: morbidly obese, pleasant, no distress     EYES: EOMI, PERRL     HENT: NC/AT, external nares normal, MMM, oropharynx clear     RESP: lungs clear to auscultation - no rales, rhonchi or wheezes     CV: regular rates and rhythm, normal S1 S2, no S3 or S4 and no murmur, click or rub     ABDOMEN: obese with large pannus, soft, nondistended, nontender     GU_female: normal vaginal structures, difficult speculum examination due to body habitus, no clear visualization of entire cervix, but internal vaginal structures appear normal, no abnormal bleeding or discharge.      MS: extremities normal- no gross deformities noted, no evidence of inflammation in joints, FROM in all extremities.     SKIN: healing furuncles present along pannus, no evidence of current dermatitis or inflammation     NEURO: AOx3, CN's II-XII grossly intact, moves all extremities     PSYCH: mentation appears normal. and affect normal/bright     ASSESSMENT/PLAN:    Pricilla was seen today for gyn exam and  smoking cessation.    Diagnoses and all orders for this visit:    Encounter for smoking cessation counseling  Tobacco use disorder  -     PHARMACY ([facility]) REFERRAL    Screening examination for venereal disease  -     Chlamydia trachomatis PCR Swab [IAI322]  -     Neisseria gonorrhoeae PCR [QMF2584]; Future    Visit for preventive health examination  -     Chlamydia trachomatis PCR Swab [TYM355]    Screening for malignant neoplasm of cervix  -     Pap Smear Exam []  -     Pap imaged thin layer screen with HPV - recommended age 30 - 65 years (select HPV order below)  -     HPV High Risk Types DNA Cervical    Encounter for gynecological examination without abnormal finding  -     Pap Smear Exam []    Morbid obesity (H)    Furuncle of abdominal wall  -     DERMATOLOGY REFERRAL for assistance with skin protective measures  -     clotrimazole (LOTRIMIN) 1 % cream; Apply topically 2 times daily as needed (skin irritation)         Pt should return to clinic for f/u with Dr. Bernal in 4-6 weeks      Asha Hilton MD  Internal Medicine PGY-3  P: [phone]  Sep 20, 2018    Pt was seen and plan of care discussed with Dr. Poe.   I was present during the visit and the patient was seen and examined by me in conjunction with the resident.  I discussed the pertinent history, exam, results and plan with the resident and agree with the documentation above with the following exceptions: none.      Kayla Poe MD

## 2018-09-20 NOTE — MR AVS SNAPSHOT
After Visit Summary   9/20/2018    Pricilla Brown    MRN: [id number]           Patient Information     Date Of Birth          1975        Visit Information        Provider Department      9/20/2018 12:35 PM Asha Hilton MD [facility]        Today's Diagnoses     Encounter for smoking cessation counseling    -  1    Tobacco use disorder        Screening examination for venereal disease        Visit for preventive health examination        Screening for malignant neoplasm of cervix        Encounter for gynecological examination without abnormal finding        Morbid obesity (H)        Furuncle of abdominal wall          Care Instructions           [facility]         Internal Medicine Resident                   Continuity Clinic    Who We Are    Resident Continuity Clinic is a part of the [facility].  Resident physicians see patients independently and establish a relationship with them over the course of their three-year residency program.  As with the Primary Care Clinic, our Resident Continuity Clinic models a group practice.  If your doctor is not available, you will be able to see another resident physician.  At the end of a resident s training, patients will be transitioned to a new resident physician for ongoing care.     We treat patients with a wide array of medical needs from routine physicals, to acute illnesses, to diabetes and blood pressure management, to complex medical illness.  What is a Resident Physician?    Resident physicians hold medical degrees and are doctors. They are training to become specialists in Internal Medicine. They work under the supervision of board-certified faculty physicians.  Expectations for Your Care    We strive to provide accessible, quality care at all times.    In order to provide this care, it is best to see your primary care resident doctor consistently rather switching between providers.  In the event  you do see another physician, you should schedule a follow-up visit with your usual primary care doctor.    If you are transitioning your care from another clinic, it is helpful to have your records available for your doctor to review.    We do not prescribe controlled substances, such as ADD medications or narcotic pain medications, on your first visit.  We will review your health records and concerns prior to devising a treatment plan with you in order to provide the best care.      Clinic Services     Extended clinic hours; patient  to help navigate your visit;  parking; laboratory and imaging services with evening and weekend hours    Multiple medical and surgical specialties in one building    Complementary services, including Nutrition, Integrative Medicine, Pharmacy consultations, Mental and Behavioral Health, Sports Medicine and Physical Therapy    Thank You    We would like to thank you for choosing the [facility] Internal [facility] Resident Continuity Clinic for your primary care. You are making a priceless contribution to the training of the next generation of health care practitioners.     Contact us at [phone] for appointments or questions.    Resident Clinic Hours are Tuesdays and Thursdays, 7:30am-5:00pm    Residents  Sade Martini MD   (Female )   Paulette Arreola MD   (Female)   Juan Musa MD  (Male)   Micky Coley MD  (Male)   Yuly Cohen MD   (Female)   Kehinde Resendiz MD  (Male)    Johnson Mendes MD  (Male)   Isidro Alexander MD  (Male)   Micky Mcdonald MD (Male)   Yan Doshi MD  (Male)   Evelyn Esparza MD (Female)    Anjelica Olivares MD (Female)   Chucky White MD  (Male)   Kylie Lyn MD(Female)   Asha Hilton MD  (Female)    Supervising Physicians   MD Caitie Amaral MD Briar Duffy, MD James Langland, MD Mary Logeais, MD Tanya Melnik, MD Charles Moldow, MD Heather Thompson Buum,  MD Clary Mills MD          Primary Care Center Medication Refill Request Information:  * Please contact your pharmacy regarding ANY request for medication refills.  ** [facility] Prescription Fax = [phone]  * Please allow 3 business days for routine medication refills.  * Please allow 5 business days for controlled substance medication refills.     Primary [facility] Test Result notification information:  *You will be notified with in 7-10 days of your appointment day regarding the results of your test.  If you are on MyChart you will be notified as soon as the provider has reviewed the results and signed off on them.    [facility]: [phone]     HOW TO QUIT SMOKING  Smoking is one of the hardest habits to break. About half of all those who have ever smoked have been able to quit, and most of those (about 70%) who still smoke want to quit. Here are some of the best ways to stop smoking.     KEEP TRYING:  It takes most smokers about 8 tries before they are finally able to fully quit. So, the more often you try and fail, the better your chance of quitting the next time! So, don't give up!    GO COLD TURKEY:  Most ex-smokers quit cold turkey. Trying to cut back gradually doesn't seem to work as well, perhaps because it continues the smoking habit. Also, it is possible to fool yourself by inhaling more while smoking fewer cigarettes. This results in the same amount of nicotine in your body!    GET SUPPORT:  Support programs can make an important difference, especially for the heavy smoker. These groups offer lectures, methods to change your behavior and peer support. Call the free national Quitline for more information. 800-QUIT-NOW (945-483-7133). Low-cost or free programs are offered by many hospitals, local chapters of the American Lung Association (409-369-8997) and the American Cancer Society (990-018-3548). Support at home is important too. Non-smokers can help by offering praise and  encouragement. If the smoker fails to quit, encourage them to try again!    OVER-THE-COUNTER MEDICINES:  For those who can't quit on their own, Nicotine Replacement Therapy (NRT) may make quitting much easier. Certain aids such as the nicotine patch, gum and lozenge are available without a prescription. However, it is best to use these under the guidance of your doctor. The skin patch provides a steady supply of nicotine to the body. Nicotine gum and lozenge gives temporary bursts of low levels of nicotine. Both methods take the edge off the craving for cigarettes. WARNING: If you feel symptoms of nicotine overdose, such as nausea, vomiting, dizziness, weakness, or fast heartbeat, stop using these and see your doctor.    PRESCRIPTION MEDICINES:  After evaluating your smoking patterns and prior attempts at quitting, your doctor may offer a prescription medicine such as bupropion (Zyban, Wellbutrin), varenicline (Chantix, Champix), a niocotine inhaler or nasal spray. Each has its unique advantage and side effects which your doctor can review with you.    HEALTH BENEFITS OF QUITTING:  The benefits of quitting start right away and keep improving the longer you go without smokin minutes: blood pressure and pulse return to normal  8 hours: oxygen levels return to normal  2 days: ability to smell and taste begins to improve as damaged nerves start to regrow  2-3 weeks: circulation and lung function improves  1-9 months: decreased cough, congestion and shortness of breath; less tired  1 year: risk of heart attack decreases by half  5 years: risk of lung cancer decreases by half; risk of stroke becomes the same as a non-smoker  For information about how to quit smoking, visit the following links:  National [employer] ,   Clearing the Air, Quit Smoking Today   - an online booklet. http://www.smokefree.gov/pubs/clearing_the_air.pdf  Smokefree.gov http://smokefree.gov/  QuitNet http://www.quitnet.com/    6833-7838  [facility], [address] [place] [address], [place] [address]. All rights reserved. This information is not intended as a substitute for professional medical care. Always follow your healthcare professional's instructions.    What we talked about today:   - Pap today, will be in touch via MyChart with results  - STI testing today  - Referral to pharmacy for smoking cessation  - Referral to dermatology for recurrent boils on abdomen  - Antifungal cream to use as needed  - Follow-up with Dr. Bernal in 4-6 weeks          Follow-ups after your visit        Additional Services     DERMATOLOGY REFERRAL       Your provider has referred you to: [facility]: Dermatology Clinic [facility] [phone]   [email or web address]    Please be aware that coverage of these services is subject to the terms and limitations of your health insurance plan.  Call member services at your health plan with any benefit or coverage questions.      Please bring the following with you to your appointment:    (1) Any X-Rays, CTs or MRIs which have been performed.  Contact the facility where they were done to arrange for  prior to your scheduled appointment.    (2) List of current medications  (3) This referral request   (4) Any documents/labs given to you for this referral            PHARMACY ([facility]) REFERRAL       Your provider has referred you to: **[facility] Scheduling (numerous locations) [phone]   [email or web address]  [facility]: Primary Care Center [facility] [phone]   [email or web address]    Reason for Referral: Tobacco Cessation    The [facility] department will contact you to schedule an appointment.  You may also schedule the appointment by calling [phone].  For [facility] [place] patients, please call [phone] to confirm/schedule your  appointment on the next business day.    This service is designed to help you get the most from your medications.  A specially trained Pharmacist will work closely with you and your providers to solve any questions, concerns, issues or problems related to your medications.    Please bring all of your prescription and non-prescription medications (such as vitamins, over-the-counter medications, and herbals) or a detailed medication list to your appointment.    If you have a glucose meter or other home monitoring information, please also bring this to your appointment (i.e. blood glucose log, blood pressure log, pain log, etc.).                  Your next 10 appointments already scheduled     Sep 21, 2018  1:00 PM CDT   Pool Treatment with Génesis Wheeler PT   [facility] ([facility])    [address] [place] [address] [address]  [facility] [address]   [phone]            Sep 26, 2018 11:00 AM CDT   (Arrive by 10:45 AM)   Office Visit with Lauren Resendez [facility] Management ([facility] [place])    [address] [place] [address]  4th [facility] [id number] [phone]           Bring a current list of meds and any records pertaining to this visit. For Physicals, please bring immunization records and any forms needing to be filled out. Please arrive 10 minutes early to complete paperwork.            Sep 28, 2018  1:00 PM CDT   Pool Treatment with Chantal Chavis PT   [facility] ([facility])    [address] [place] [address]  [facility] [address]   [phone]            Oct 01, 2018  1:40 PM CDT   Pool Treatment with Génesis Wheeler PT   [facility] ([facility])    [address] [place] [address]  [facility] [address]   [phone]            Oct 03, 2018  2:00 PM CDT   (Arrive by 1:45 PM)   New Patient Visit with Colby Triplett MD   [facility] ([facility])    [address] [facility]  [address]rd [facility] [address]   [phone]            Oct 04, 2018 11:30 AM CDT   Return Visit with Norman Jean DPM   [facility] ([facility])    [address] [place] [address]   [phone]            Oct 04, 2018  1:30 PM CDT   (Arrive by 1:15 PM)   Return Weight Management Visit with Steph Kim PA-C   [facility] Medical Weight Management ([facility] [place] [facility])    [address]  [address] [facility] [address]   [phone]            Oct 05, 2018  1:00 PM CDT   Pool Treatment with [facility] ([facility])    [address]  [address]  [facility] [address]   [phone]            Oct 08, 2018  1:40 PM CDT   Pool Treatment with [facility] ([facility])    [address]  [address]  [facility] [address]   [phone]            Oct 09, 2018 11:15 AM CDT   (Arrive by 11:00 AM)   Return Weight Management Visit with Silva Damon MD   [facility] ([facility])    [address]  [address] [facility] [address]   [phone]              Who to contact     Please call your clinic at [phone] to:    Ask questions about your health    Make or cancel appointments    Discuss your medicines    Learn about your test results    Speak to your doctor            Additional Information About Your Visit        [employer] Information     [employer] gives you secure access to your electronic health record. If you see a primary care provider, you can also send messages to your care team and make appointments. If you have questions, please call your primary care clinic.  If you do not have a primary care provider, please call [phone] and they will assist you.      [employer] is an electronic gateway  that provides easy, online access to your medical records. With [facility], you can request a clinic appointment, read your test results, renew a prescription or communicate with your care team.     To access your existing account, please contact your [facility] or call [phone] for assistance.        Care EveryWhere ID     This is your Care EveryWhere ID. This could be used by other organizations to access your [facility] medical records  [id number]        Your Vitals Were     Pulse Last Period Pulse Oximetry BMI (Body Mass Index)          85 (LMP Unknown) 100% 71.42 kg/m2         Blood Pressure from Last 3 Encounters:   09/20/18 135/82   09/11/18 123/82   07/25/18 142/79    Weight from Last 3 Encounters:   09/20/18 (!) 206.8 kg (456 lb)   09/11/18 (!) 210.7 kg (464 lb 9.6 oz)   07/25/18 (!) 212.4 kg (468 lb 3.2 oz)              We Performed the Following     Chlamydia trachomatis PCR Swab [VHF086]     DERMATOLOGY REFERRAL     HPV High Risk Types DNA Cervical     Neisseria gonorrhoeae PCR [TAD9146]     Neisseria gonorrhoeae PCR     Pap imaged thin layer screen with HPV - recommended age 30 - 65 years (select HPV order below)     Pap Smear Exam []     PHARMACY ([facility]) REFERRAL          Today's Medication Changes          These changes are accurate as of 9/20/18  2:39 PM.  If you have any questions, ask your nurse or doctor.               These medicines have changed or have updated prescriptions.        Dose/Directions    * clotrimazole 1 % cream   Commonly known as:  LOTRIMIN   This may have changed:  Another medication with the same name was added. Make sure you understand how and when to take each.   Used for:  Tinea pedis of right foot   Changed by:  Asha Hilton MD        Apply topically 2 times daily   Quantity:  30 g   Refills:  1       * clotrimazole 1 % cream   Commonly known as:  LOTRIMIN   This may have changed:  You were already taking a medication with the same  name, and this prescription was added. Make sure you understand how and when to take each.   Used for:  Furuncle of abdominal wall   Changed by:  Asha Hilton MD        Apply topically 2 times daily as needed (skin irritation)   Quantity:  60 g   Refills:  1       * Notice:  This list has 2 medication(s) that are the same as other medications prescribed for you. Read the directions carefully, and ask your doctor or other care provider to review them with you.         Where to get your medicines      These medications were sent to [place], [place] - [address] [facility] [address]  [address] [facility] [address] [place] [address]    Hours:  TRANSPLANT PHONE NUMBER [phone] Phone:  [phone]     clotrimazole 1 % cream                Primary Care Provider Office Phone # Fax #    Jamilah Bernal [phone]       [address] [address]        Equal Access to Services     KIERSTEN DRIVER AH: Hadii aad ku hadasho Soomaali, waaxda luqadaha, qaybta kaalmada adeegyada, waxay idiin hayprospern galileo hurt . So [facility] [phone].    ATENCIÓN: Si habla español, tiene a de la rsoa disposición servicios gratuitos de asistencia lingüística. Llame al [phone].    We comply with applicable federal civil rights laws and [place] laws. We do not discriminate on the basis of race, color, national origin, age, disability, sex, sexual orientation, or gender identity.            Thank you!     Thank you for choosing [facility]  for your care. Our goal is always to provide you with excellent care. Hearing back from our patients is one way we can continue to improve our services. Please take a few minutes to complete the written survey that you may receive in the mail after your visit with us. Thank you!             Your Updated Medication List - Protect others around you: Learn how to safely use, store and throw away your medicines at  www.disposemymeds.org.          This list is accurate as of 9/20/18  2:39 PM.  Always use your most recent med list.                   Brand Name Dispense Instructions for use Diagnosis    acetaminophen 325 MG tablet    TYLENOL    180 tablet    Take 2 tablets (650 mg) by mouth 3 times daily as needed for mild pain or fever (total acetaminophen dose should not exceed 3000 mg per day)    Neuropathic pain of lower extremity, unspecified laterality       albuterol 108 (90 Base) MCG/ACT inhaler    PROAIR HFA/PROVENTIL HFA/VENTOLIN HFA    1 Inhaler    Inhale 2 puffs into the lungs every 6 hours as needed.    Reactive airway disease       bisacodyl 10 MG Suppository    DULCOLAX    6 suppository    Place 1 suppository (10 mg) rectally daily as needed for constipation    Constipation       blood glucose lancets standard    no brand specified    100 each    USE TO CHECK  blood sugar fasting each am  prelunch and predinner daily OR AS DIRECTED Call clinic to schedule MD APPT.    Diabetes mellitus, type 2 (H)       blood glucose monitoring test strip    no brand specified    100 strip    Use to test blood sugars 3 times daily or as directed    Diabetes mellitus, type 2 (H)       buPROPion 100 MG 12 hr tablet    WELLBUTRIN SR    180 tablet    Take 1 tablet (100 mg) by mouth 2 times daily    Tobacco abuse       capsaicin 0.025 % Crea cream    ZOSTRIX    1 Tube    Apply 1 g topically 3 times daily as needed    Dermatophytosis of nail       ciclopirox 8 % Soln     1 Bottle    Externally apply topically daily To toenails.    Dermatophytosis of nail       * clotrimazole 1 % cream    LOTRIMIN    30 g    Apply topically 2 times daily    Tinea pedis of right foot       * clotrimazole 1 % cream    LOTRIMIN    60 g    Apply topically 2 times daily as needed (skin irritation)    Furuncle of abdominal wall       colchicine 0.6 MG tablet    COLCYRS    30 tablet    Take 1-2 tablets (0.6-1.2 mg) by mouth daily as needed for moderate pain *Call  for appointment [phone]. Over 12 months since last seen.    Acute gouty arthritis       diclofenac 1 % Gel topical gel    VOLTAREN    100 g    Apply 4 grams to knees or 2 grams to hands four times daily using enclosed dosing card.    Left foot pain       dulaglutide 1.5 MG/0.5ML pen    TRULICITY    45 mL    Inject 1.5 mg Subcutaneous every 7 days    Type 2 diabetes mellitus with hyperglycemia, with long-term current use of insulin (H)       DULoxetine 20 MG EC capsule    CYMBALTA    60 capsule    Take 1 capsule (20 mg) by mouth 2 times daily    Depression       Efinaconazole 10 % Soln     8 mL    Externally apply topically daily To toenails.    Dermatophytosis of nail       gabapentin 300 MG capsule    NEURONTIN    120 capsule    Take 2 capsules (600 mg) by mouth 2 times daily Needs PCP appt for further refills    Type 2 diabetes mellitus with hyperglycemia, with long-term current use of insulin (H)       hydrALAZINE 25 MG tablet    APRESOLINE    450 tablet    Take 50-75 mg by mouth Take 2 tabs (50 mg) in am, 2 tabs (50 mg) midday, and 3 tabs (75 mg) in pm daily    Dilated cardiomyopathy (H)       * iBG star w/Device Kit     1 kit    -PLEASE GIVE PATIENT A DEVICE HER INSURANCE WILL COVER-    Type 2 diabetes mellitus with hyperglycemia, with long-term current use of insulin (H)       * blood glucose monitoring meter device kit    no brand specified    1 kit    Use to test blood sugar 3 times daily or as directed.    Diabetes mellitus, type 2 (H)       insulin glargine U-300 300 UNIT/ML injection    TOUJEO    30 mL    Inject 170 units SQ each am.    Type 2 diabetes mellitus with hyperglycemia, with long-term current use of insulin (H)       insulin pen needle 32G X 4 MM    BD RIVER U/F    200 each    Use 6 daily or as directed* Call clinic to schedule  MD APPT.    Diabetes mellitus, type 2 (H)       levothyroxine 200 MCG tablet    SYNTHROID/LEVOTHROID    102 tablet    Take 1 tab Monday thru Saturday and 2 tabs on  Sundays.    Other specified hypothyroidism       metolazone 2.5 MG tablet    ZAROXOLYN    60 tablet    Take 1 tablet (2.5 mg) by mouth 2 times daily Take 1/2 hour prior to torsemide    Acute on chronic systolic heart failure (H)       metoprolol succinate 100 MG 24 hr tablet    TOPROL-XL    90 tablet    Take 1 tablet (100 mg) by mouth daily    Chronic diastolic heart failure (H)       morphine 15 MG 12 hr tablet    MS CONTIN     Take 15 mg by mouth daily as needed        Nebulizer Compressor Kit     1 kit    1 Device 4 times daily as needed.    COPD (chronic obstructive pulmonary disease) (H)       nicotine 14 MG/24HR 24 hr patch    [employer] NICOTINE    30 patch    Place 1 patch onto the skin every 24 hours    Tobacco abuse       NovoLOG FLEXPEN 100 UNIT/ML injection   Generic drug:  insulin aspart     180 mL    Inject 65 units with meals plus correction. Pt uses approx 190 units in 24 hrs.    Type 2 diabetes mellitus with hyperglycemia, with long-term current use of insulin (H)       nystatin cream    MYCOSTATIN    90 g    Apply topically 2 times daily To toenails    Dermatophytosis of nail       order for DME     1 each    Use your CPAP device as directed by your provider. Pressure change to min 13 max 18cwp        * order for DME     1 each    Equipment being ordered: Challenger Wide walker if available - patient needs seat, basket and brakes.    Dilated cardiomyopathy (H), Chronic systolic heart failure (H)       * order for DME     1 each    Equipment being ordered: BI [id number] $92  Plantar, fasciitis, LG, night splint    Dermatophytosis of nail, Plantar fasciitis of right foot, Diabetic neuropathy with neurologic complication (H), Peroneal tendinitis, right       * order for DME     1 Units    Left foot    Left foot pain, Diabetic neuropathy with neurologic complication (H)       oxyCODONE-acetaminophen 5-325 MG per tablet    PERCOCET    60 tablet    Take 1 tablet by mouth every 8 hours as needed for moderate  to severe pain (try to limit use, no further prescriptions until seen in pain clinic)    Lumbago, Bilateral low back pain with sciatica, sciatica laterality unspecified       polyethylene glycol powder    MIRALAX/GLYCOLAX          potassium chloride SA 20 MEQ CR tablet    K-DUR/KLOR-CON M    240 tablet    TAKE TWO TABLETS BY MOUTH FOUR TIMES A DAY    Hypokalemia       senna 8.6 MG tablet    SENOKOT    45 tablet    Take 1 tablet by mouth 2 times daily    Constipation       spironolactone 50 MG tablet    ALDACTONE    30 tablet    Take 1 tablet (50 mg) by mouth daily        tiotropium 18 MCG capsule    SPIRIVA HANDIHALER    30 capsule    Inhale contents of one capsule daily.    COPD (chronic obstructive pulmonary disease) (H)       topiramate 25 MG tablet    TOPAMAX    360 tablet    25 mg at bedtime for 1 week, 50 mg at bedtime for 1 week and 75 mg daily at bedtime thereafter    Morbid obesity (H)       torsemide 100 MG tablet    DEMADEX    180 tablet    Take 1 tablet (100 mg) by mouth 2 times daily    Chronic diastolic heart failure (H)       warfarin 5 MG tablet    COUMADIN    90 tablet    Take one to two tablets daily or as directed by the [facility]    Atrial fibrillation, unspecified type (H), Long-term (current) use of anticoagulants       * Notice:  This list has 7 medication(s) that are the same as other medications prescribed for you. Read the directions carefully, and ask your doctor or other care provider to review them with you.

## 2018-09-20 NOTE — LETTER
10/5/2018         Braxton Brown   [address]  [facility] [address]        Dear Ms. Brown:    Your labs were reviewed by Kayla Diaz MD and are within acceptable ranges.  No changes are recommended.     Results for orders placed or performed in visit on 09/20/18   Pap imaged thin layer screen with HPV - recommended age 30 - 65 years (select HPV order below)   Result Value Ref Range    PAP NIL     Copath Report         Patient Name: BRAXTON BROWN  MR#: [id number]  Specimen #: [id number]  Collected: 9/20/2018  Received: 9/21/2018  Reported: 9/25/2018 09:11  Ordering Phy(s): KAYLA DIAZ    For improved result formatting, select 'View Enhanced Report Format' under   Linked Documents section.    SPECIMEN/STAIN PROCESS:  Pap imaged thin layer prep screening (Surepath, FocalPoint with guided   screening)       Pap-Cyto x 1, HPV ordered x 1    SOURCE: Cervical, endocervical  ----------------------------------------------------------------   Pap imaged thin layer prep screening (Surepath, FocalPoint with guided   screening)  SPECIMEN ADEQUACY:  Satisfactory for evaluation.  -Transformation zone component absent.    CYTOLOGIC INTERPRETATION:    Negative for intraepithelial lesion or malignancy    Electronically signed out by:  ELROY Arthur (ASCP)    Processed and screened at [facility],   [facility]    CLINICAL HISTORY:    Irregular periods  Curren tly not having periods, A previous normal pap  Date of Last Pap: 3/25/13,    Papanicolaou Test Limitations:  Cervical cytology is a screening test with   limited sensitivity; regular  screening is critical for cancer prevention; Pap tests are primarily   effective for the diagnosis/prevention of  squamous cell carcinoma, not adenocarcinomas or other cancers.    TESTING LAB LOCATION:  [facility], [address]   [address]  [phone]    COLLECTION SITE:  Client:  [facility], [place]  Location: [facility] (B)       HPV High Risk Types DNA Cervical   Result Value Ref Range    HPV Source SurePath     HPV 16 DNA Negative NEG^Negative    HPV 18 DNA Negative NEG^Negative    Other HR HPV Negative NEG^Negative    Final Diagnosis This patient's sample is negative for HPV DNA.     Specimen Description Cervical Cells    Chlamydia trachomatis PCR Swab [EFM558]   Result Value Ref Range    Specimen Description Endocervical     Chlamydia Trachomatis PCR Negative NEG^Negative   Neisseria gonorrhoeae PCR [[id number]]   Result Value Ref Range    Specimen Descrip Endocervical     N Gonorrhea PCR Negative NEG^Negative     *Note: Due to a large number of results and/or encounters for the requested time period, some results have not been displayed. A complete set of results can be found in Results Review.         Please note that test explanations are brief and do not reflect all diagnostic uses.  If you have any questions or concerns, please call the clinic at [phone].      Sincerely,      Jammie Leon sent on behalf of  Kayla Poe MD

## 2018-09-20 NOTE — LETTER
9/25/2018         Braxton Brown   [address]  [facility] [address]        Dear Ms. Brown:    Your labs were reviewed by Kayla Diaz MD and are within acceptable ranges.  No changes are recommended.           Results for orders placed or performed in visit on 09/20/18   Pap imaged thin layer screen with HPV - recommended age 30 - 65 years (select HPV order below)   Result Value Ref Range    PAP NIL     Copath Report         Patient Name: BRAXTON BROWN  MR#: [id number]  Specimen #: [id number]  Collected: 9/20/2018  Received: 9/21/2018  Reported: 9/25/2018 09:11  Ordering Phy(s): KAYLA DIAZ    For improved result formatting, select 'View Enhanced Report Format' under   Linked Documents section.    SPECIMEN/STAIN PROCESS:  Pap imaged thin layer prep screening (Surepath, FocalPoint with guided   screening)       Pap-Cyto x 1, HPV ordered x 1    SOURCE: Cervical, endocervical  ----------------------------------------------------------------   Pap imaged thin layer prep screening (Surepath, FocalPoint with guided   screening)  SPECIMEN ADEQUACY:  Satisfactory for evaluation.  -Transformation zone component absent.    CYTOLOGIC INTERPRETATION:    Negative for intraepithelial lesion or malignancy    Electronically signed out by:  ELROY Arthur (ASCP)    Processed and screened at [facility],   [facility]    CLINICAL HISTORY:    Irregular periods  Curren tly not having periods, A previous normal pap  Date of Last Pap: 3/25/13,    Papanicolaou Test Limitations:  Cervical cytology is a screening test with   limited sensitivity; regular  screening is critical for cancer prevention; Pap tests are primarily   effective for the diagnosis/prevention of  squamous cell carcinoma, not adenocarcinomas or other cancers.    TESTING LAB LOCATION:  [facility], [address]   [address]  [phone]    COLLECTION SITE:  Client:  [facility], [place]  Location: [facility] (B)       Chlamydia trachomatis PCR Swab [[id number]]   Result Value Ref Range    Specimen Description Endocervical     Chlamydia Trachomatis PCR Negative NEG^Negative   Neisseria gonorrhoeae PCR [[id number]]   Result Value Ref Range    Specimen Descrip Endocervical     N Gonorrhea PCR Negative NEG^Negative     *Note: Due to a large number of results and/or encounters for the requested time period, some results have not been displayed. A complete set of results can be found in Results Review.         Please note that test explanations are brief and do not reflect all diagnostic uses.  If you have any questions or concerns, please call the clinic at [phone].      Sincerely,      Jammie Leon sent on behalf of  Kayla Poe MD

## 2018-09-20 NOTE — PROGRESS NOTES
ANTICOAGULATION FOLLOW-UP CLINIC VISIT    Patient Name:  Pricilla Brown  Date:  9/20/2018  Contact Type:  Telephone    SUBJECTIVE:     Patient Findings     Positives Other complaints    Comments Pt had diarrhea x 3 days recently.  It has now stopped.  Suspect this is the reason for the high INR  One time dose adjustment today only            OBJECTIVE    INR   Date Value Ref Range Status   09/20/2018 4.07 (H) 0.86 - 1.14 Final     Chromogenic Factor 10   Date Value Ref Range Status   04/08/2017 19 (L) 70 - 130 % Final     Comment:     Therapeutic Range:  A Chromogenic Factor 10 level of approximately 20-40%   inversely correlates with an INR of 2-3 for patients receiving Warfarin.   Chromogenic Factor 10 levels below 20% indicate an INR greater than 3 and   levels above 40% indicate an INR less than 2.         ASSESSMENT / PLAN  INR assessment SUPRA    Recheck INR In: 8 DAYS    INR Location Clinic      Anticoagulation Summary as of 9/20/2018     INR goal 2.0-2.5   Today's INR 4.07!   Warfarin maintenance plan 20 mg (5 mg x 4) on Wed, Fri; 15 mg (5 mg x 3) all other days   Full warfarin instructions 9/20: 7.5 mg; Otherwise 20 mg on Wed, Fri; 15 mg all other days   Weekly warfarin total 115 mg   Plan last modified Demi Kebede RN (8/15/2018)   Next INR check 9/28/2018   Priority INR   Target end date Indefinite    Indications   Atrial fibrillation (H) [I48.91] [I48.91]  Long-term (current) use of anticoagulants [Z79.01] [Z79.01]         Anticoagulation Episode Summary     INR check location Clinic Lab    Preferred lab     Send INR reminders to [facility]    Comments Contact Patient at [phone]      Anticoagulation Care Providers     Provider Role Specialty Phone number    Percy Alcala MD  Cardiology [phone]            See the Encounter Report to view Anticoagulation Flowsheet and Dosing Calendar (Go to Encounters tab in chart review, and find the Anticoagulation Therapy Visit)    See  above notation.  Spoke with the pt    Kristen Rios RN

## 2018-09-20 NOTE — MR AVS SNAPSHOT
After Visit Summary   9/20/2018    Pricilla Brown    MRN: [id number]           Patient Information     Date Of Birth          1975        Visit Information        Provider Department      9/20/2018 1:30 PM Velvet Mcallister RD M [facility] Surgical Weight Management         Follow-ups after your visit        Your next 10 appointments already scheduled     Sep 21, 2018  1:00 PM CDT   Pool Treatment with Génesis Wheeler, PT   [facility] ([facility])    [address]  [address]  [place] [address]   [phone]            Sep 28, 2018  1:00 PM CDT   Pool Treatment with Chantal Chavis, PT   [facility] ([facility])    [address]  [address]  [place] [address]   [phone]            Oct 01, 2018  1:40 PM CDT   Pool Treatment with Génesis Wheeler, PT   [facility] ([facility])    [address]  [address]  [place] [address]   [phone]            Oct 04, 2018 11:30 AM CDT   Return Visit with Norman Jean DPM   [facility] ([facility])    [address] [place] [id number] [phone]            Oct 04, 2018  1:30 PM CDT   (Arrive by 1:15 PM)   Return Weight Management Visit with SHANE Murcia [facility] Medical Weight Management ([facility])    [address] [place] [address]   [phone]            Oct 05, 2018  1:00 PM CDT   Pool Treatment with Génesis Wheeler, PT   [facility] ([facility])    [address]  [address]  [place] [address]   [phone]            Oct 08, 2018  1:40 PM CDT   Pool Treatment with Génesis Wheeler, PT   [facility] ([facility])    [address]  [address]  [place] [address]   [phone]            Oct 09, 2018 11:15 AM CDT   (Arrive by 11:00  AM)   Return Weight Management Visit with MD IVY Suresh [facility] Management ([facility])    [address]  4th [address]  [facility] [id number] [phone]            Oct 11, 2018 11:30 AM CDT   (Arrive by 11:15 AM)   RETURN DIABETES with SHANE Rojas [facility] Endocrinology ([facility])    [address]  3rd Floor  [facility] [address] [phone]            Oct 12, 2018  1:00 PM CDT   Pool Treatment with Génesis Wheeler PT   [facility] ([facility])    [address] [place] [address]  [address]  [facility] [id number] [phone]              Future tests that were ordered for you today     Open Future Orders        Priority Expected Expires Ordered    Neisseria gonorrhoeae PCR [[id number]] Routine  9/20/2018 9/20/2018            Who to contact     Please call your clinic at [phone] to:    Ask questions about your health    Make or cancel appointments    Discuss your medicines    Learn about your test results    Speak to your doctor            Additional Information About Your Visit        [employer] Information     [employer] gives you secure access to your electronic health record. If you see a primary care provider, you can also send messages to your care team and make appointments. If you have questions, please call your primary care clinic.  If you do not have a primary care provider, please call [phone] and they will assist you.      [employer] is an electronic gateway that provides easy, online access to your medical records. With [employer], you can request a clinic appointment, read your test results, renew a prescription or communicate with your care team.     To access your existing account, please contact your [facility] or call [phone] for assistance.        Care EveryWhere ID     This is your Care EveryWhere ID. This could be used by  other organizations to access your [facility] medical records  [id number]        Your Vitals Were     Last Period                   (LMP Unknown)            Blood Pressure from Last 3 Encounters:   09/20/18 135/82   09/11/18 123/82   07/25/18 142/79    Weight from Last 3 Encounters:   09/20/18 (!) 456 lb   09/11/18 (!) 464 lb 9.6 oz   07/25/18 (!) 468 lb 3.2 oz              Today, you had the following     No orders found for display         Today's Medication Changes          These changes are accurate as of 9/20/18  2:10 PM.  If you have any questions, ask your nurse or doctor.               These medicines have changed or have updated prescriptions.        Dose/Directions    * clotrimazole 1 % cream   Commonly known as:  LOTRIMIN   This may have changed:  Another medication with the same name was added. Make sure you understand how and when to take each.   Used for:  Tinea pedis of right foot   Changed by:  Asha Hilton MD        Apply topically 2 times daily   Quantity:  30 g   Refills:  1       * clotrimazole 1 % cream   Commonly known as:  LOTRIMIN   This may have changed:  You were already taking a medication with the same name, and this prescription was added. Make sure you understand how and when to take each.   Used for:  Furuncle of abdominal wall   Changed by:  Asha Hilton MD        Apply topically 2 times daily as needed (skin irritation)   Quantity:  60 g   Refills:  1       * Notice:  This list has 2 medication(s) that are the same as other medications prescribed for you. Read the directions carefully, and ask your doctor or other care provider to review them with you.         Where to get your medicines      These medications were sent to [facility] [place], [place] - [address]  [address] [address]    Hours:  TRANSPLANT PHONE NUMBER [phone] Phone:  [phone]     clotrimazole 1 % cream                Primary Care  Provider Office Phone # Fax #    Jamilah Bernal [phone] [address] [address]        Equal Access to Services     KIERSTEN DRIVER : Hadjamal miki ku preeto Somauricioali, waaxda luqadaha, qaybta kaalmada alyssa, bola mylesstephen walker. So [facility] [phone].    ATENCIÓN: Si habla español, tiene a de la rosa disposición servicios gratuitos de asistencia lingüística. Llame al [phone].    We comply with applicable federal civil rights laws and [place] laws. We do not discriminate on the basis of race, color, national origin, age, disability, sex, sexual orientation, or gender identity.            Thank you!     Thank you for choosing [facility]NAGEMENT  for your care. Our goal is always to provide you with excellent care. Hearing back from our patients is one way we can continue to improve our services. Please take a few minutes to complete the written survey that you may receive in the mail after your visit with us. Thank you!             Your Updated Medication List - Protect others around you: Learn how to safely use, store and throw away your medicines at www.disposemymeds.org.          This list is accurate as of 9/20/18  2:10 PM.  Always use your most recent med list.                   Brand Name Dispense Instructions for use Diagnosis    acetaminophen 325 MG tablet    TYLENOL    180 tablet    Take 2 tablets (650 mg) by mouth 3 times daily as needed for mild pain or fever (total acetaminophen dose should not exceed 3000 mg per day)    Neuropathic pain of lower extremity, unspecified laterality       albuterol 108 (90 Base) MCG/ACT inhaler    PROAIR HFA/PROVENTIL HFA/VENTOLIN HFA    1 Inhaler    Inhale 2 puffs into the lungs every 6 hours as needed.    Reactive airway disease       bisacodyl 10 MG Suppository    DULCOLAX    6 suppository    Place 1 suppository (10 mg) rectally daily as needed for constipation    Constipation        blood glucose lancets standard    no brand specified    100 each    USE TO CHECK  blood sugar fasting each am  prelunch and predinner daily OR AS DIRECTED Call clinic to schedule MD APPT.    Diabetes mellitus, type 2 (H)       blood glucose monitoring test strip    no brand specified    100 strip    Use to test blood sugars 3 times daily or as directed    Diabetes mellitus, type 2 (H)       buPROPion 100 MG 12 hr tablet    WELLBUTRIN SR    180 tablet    Take 1 tablet (100 mg) by mouth 2 times daily    Tobacco abuse       capsaicin 0.025 % Crea cream    ZOSTRIX    1 Tube    Apply 1 g topically 3 times daily as needed    Dermatophytosis of nail       ciclopirox 8 % Soln     1 Bottle    Externally apply topically daily To toenails.    Dermatophytosis of nail       * clotrimazole 1 % cream    LOTRIMIN    30 g    Apply topically 2 times daily    Tinea pedis of right foot       * clotrimazole 1 % cream    LOTRIMIN    60 g    Apply topically 2 times daily as needed (skin irritation)    Furuncle of abdominal wall       colchicine 0.6 MG tablet    COLCYRS    30 tablet    Take 1-2 tablets (0.6-1.2 mg) by mouth daily as needed for moderate pain *Call for appointment [phone]. Over 12 months since last seen.    Acute gouty arthritis       diclofenac 1 % Gel topical gel    VOLTAREN    100 g    Apply 4 grams to knees or 2 grams to hands four times daily using enclosed dosing card.    Left foot pain       dulaglutide 1.5 MG/0.5ML pen    TRULICITY    45 mL    Inject 1.5 mg Subcutaneous every 7 days    Type 2 diabetes mellitus with hyperglycemia, with long-term current use of insulin (H)       DULoxetine 20 MG EC capsule    CYMBALTA    60 capsule    Take 1 capsule (20 mg) by mouth 2 times daily    Depression       Efinaconazole 10 % Soln     8 mL    Externally apply topically daily To toenails.    Dermatophytosis of nail       gabapentin 300 MG capsule    NEURONTIN    120 capsule    Take 2 capsules (600 mg) by mouth 2 times  daily Needs PCP appt for further refills    Type 2 diabetes mellitus with hyperglycemia, with long-term current use of insulin (H)       hydrALAZINE 25 MG tablet    APRESOLINE    450 tablet    Take 50-75 mg by mouth Take 2 tabs (50 mg) in am, 2 tabs (50 mg) midday, and 3 tabs (75 mg) in pm daily    Dilated cardiomyopathy (H)       * iBG star w/Device Kit     1 kit    -PLEASE GIVE PATIENT A DEVICE HER INSURANCE WILL COVER-    Type 2 diabetes mellitus with hyperglycemia, with long-term current use of insulin (H)       * blood glucose monitoring meter device kit    no brand specified    1 kit    Use to test blood sugar 3 times daily or as directed.    Diabetes mellitus, type 2 (H)       insulin glargine U-300 300 UNIT/ML injection    TOUJEO    30 mL    Inject 170 units SQ each am.    Type 2 diabetes mellitus with hyperglycemia, with long-term current use of insulin (H)       insulin pen needle 32G X 4 MM    BD RIVER U/F    200 each    Use 6 daily or as directed* Call clinic to schedule  MD APPT.    Diabetes mellitus, type 2 (H)       levothyroxine 200 MCG tablet    SYNTHROID/LEVOTHROID    102 tablet    Take 1 tab Monday thru Saturday and 2 tabs on Sundays.    Other specified hypothyroidism       metolazone 2.5 MG tablet    ZAROXOLYN    60 tablet    Take 1 tablet (2.5 mg) by mouth 2 times daily Take 1/2 hour prior to torsemide    Acute on chronic systolic heart failure (H)       metoprolol succinate 100 MG 24 hr tablet    TOPROL-XL    90 tablet    Take 1 tablet (100 mg) by mouth daily    Chronic diastolic heart failure (H)       morphine 15 MG 12 hr tablet    MS CONTIN     Take 15 mg by mouth daily as needed        Nebulizer Compressor Kit     1 kit    1 Device 4 times daily as needed.    COPD (chronic obstructive pulmonary disease) (H)       nicotine 14 MG/24HR 24 hr patch    [employer] NICOTINE    30 patch    Place 1 patch onto the skin every 24 hours    Tobacco abuse       NovoLOG FLEXPEN 100 UNIT/ML injection   Generic  drug:  insulin aspart     180 mL    Inject 65 units with meals plus correction. Pt uses approx 190 units in 24 hrs.    Type 2 diabetes mellitus with hyperglycemia, with long-term current use of insulin (H)       nystatin cream    MYCOSTATIN    90 g    Apply topically 2 times daily To toenails    Dermatophytosis of nail       order for DME     1 each    Use your CPAP device as directed by your provider. Pressure change to min 13 max 18cwp        * order for DME     1 each    Equipment being ordered: Challenger Wide walker if available - patient needs seat, basket and brakes.    Dilated cardiomyopathy (H), Chronic systolic heart failure (H)       * order for DME     1 each    Equipment being ordered: [id number] $92  Plantar, fasciitis, LG, night splint    Dermatophytosis of nail, Plantar fasciitis of right foot, Diabetic neuropathy with neurologic complication (H), Peroneal tendinitis, right       * order for DME     1 Units    Left foot    Left foot pain, Diabetic neuropathy with neurologic complication (H)       oxyCODONE-acetaminophen 5-325 MG per tablet    PERCOCET    60 tablet    Take 1 tablet by mouth every 8 hours as needed for moderate to severe pain (try to limit use, no further prescriptions until seen in pain clinic)    Lumbago, Bilateral low back pain with sciatica, sciatica laterality unspecified       polyethylene glycol powder    MIRALAX/GLYCOLAX          potassium chloride SA 20 MEQ CR tablet    K-DUR/KLOR-CON M    240 tablet    TAKE TWO TABLETS BY MOUTH FOUR TIMES A DAY    Hypokalemia       senna 8.6 MG tablet    SENOKOT    45 tablet    Take 1 tablet by mouth 2 times daily    Constipation       spironolactone 50 MG tablet    ALDACTONE    30 tablet    Take 1 tablet (50 mg) by mouth daily        tiotropium 18 MCG capsule    SPIRIVA HANDIHALER    30 capsule    Inhale contents of one capsule daily.    COPD (chronic obstructive pulmonary disease) (H)       topiramate 25 MG tablet    TOPAMAX    360  tablet    25 mg at bedtime for 1 week, 50 mg at bedtime for 1 week and 75 mg daily at bedtime thereafter    Morbid obesity (H)       torsemide 100 MG tablet    DEMADEX    180 tablet    Take 1 tablet (100 mg) by mouth 2 times daily    Chronic diastolic heart failure (H)       warfarin 5 MG tablet    COUMADIN    90 tablet    Take one to two tablets daily or as directed by the Coumadin Clinic    Atrial fibrillation, unspecified type (H), Long-term (current) use of anticoagulants       * Notice:  This list has 7 medication(s) that are the same as other medications prescribed for you. Read the directions carefully, and ask your doctor or other care provider to review them with you.

## 2018-09-20 NOTE — NURSING NOTE
Chief Complaint   Patient presents with     Gyn Exam     Pt is here for gyn exam.      Smoking Cessation     Pt is here for smoking cessation.      Shivani Esparza LPN at 12:31 PM on 9/20/2018.

## 2018-09-20 NOTE — PROGRESS NOTES
"  Bariatric Nutrition Consultation Note    Reason For Visit: Nutrition Reassessment    Pricilla Brown is a 43 year-old (type 2 DM on insulin sliding scale) presenting today for bariatric nutrition consult.  Pt is interested in laparoscopic sleeve gastrectomy.  Pt has met all required RD visits prior to surgery.  Pt was referred by NANETTE Kerr and Dr. Silva Damon.    Patient signed ABN 9/20/18-9/20/19.    Coordination Notes:,   5/29/18: In light of it being several months before anticipating surgery, writer will revisit task list when Pt is closer to weight loss goal.  Writer will continue to encourage Pt to come at least monthly to see RD.    ANTHROPOMETRICS:  Ht: 67\"  Initial Wt: 462.6 lbs with BMI of 72.6  Current Wt: 454.6 lbs (-9 lbs over the past month; -2.6 lbs from initial weight)     Required weight loss goal pre-op: -46 lbs from initial consult weight (goal weight 416.6 lbs or less before surgery)    NUTRITION HISTORY:  Food Allergies/Intolerances: none known  Cravings: sweets, chips, pop (diet)  Triggers/cues causing extra eating: boredom (currently on disability, not working)  *Pt is on topiramate per Dr. Silva Damon who would like Pt to re-try modified liquid diet with 2 shakes/day.    Recent food recall:  Shake  Turkey sandwich on bread with apple and popcorn  Turkey burger on bun  Beverages- tea with splenda    Progress with Previous Goals:  Relating To Eating:  -Follow the Modified Liquid Diet for weight loss:  continues  Breakfast: Protein Shake (160-250 Jagjit, 20-35 g protein)  Lunch: 3 oz lean protein (chicken/turkey breast or fish)  + non-starchy vegetables (non-starchy veg: green leafy vegetables, cucumbers, broccoli, cauliflower, green string beans)  Supper: 3 oz lean protein (chicken/turkey breast or fish)  + non-starchy vegetables (non-starchy veg: green leafy vegetables, cucumbers, broccoli, cauliflower, green string beans)  Snack: 1 protein bar + non-starchy " vegetables (no calorie-containing dips/condiments)  Beverages: at least 48-64 oz water between meals daily (Powerade Zero or Propel Zero or Crystal Light or [place], less than 5 Calories per serving)    *Protein Shake Criteria: no more than 250 Calories, at least 20 grams of protein, and less than 10 grams of sugar     Frozen Meal Replacements  Healthy Choice  Lean Cuisine  Atkins Meals  Smart Ones    -Continue eating slowly (20-30 minutes per meal), chewing foods well (25 chews per bite/applesauce  Consistency) continues    -Increase exercise as able.  (Pool therapy, walking, etc) pool and land therapy    -Continue to work on quitting smoking. 8-10 cigarettes per day     NUTRITION DIAGNOSIS:  Obesity r/t long history of self-monitoring deficit and excessive energy intake aeb BMI >30.- continues    INTERVENTION:  Intervention Provided/Education Provided:  Praised Pt on good progress with weight loss through positive changes made over the past month.  Pt report not being ready for 2 shakes/day.  Patient continues to go to land therapy and will start pool therapy soon.  She plans to see a pharmacist to get help with quitting smoking.  Gave encouragement and support.  Provided Pt with list of goals and task list.      Note: Pt stated she did not purchase clinic pharmaceutical grade meal replacements due to financial challenges.     Patient Understanding: good  Expected Compliance: good      GOALS:  Relating To Eating:  -Follow the Modified Liquid Diet for weight loss:    Breakfast: Protein Shake (160-250 Jagjit, 20-35 g protein)  Lunch: 3 oz lean protein (chicken/turkey breast or fish)  + non-starchy vegetables (non-starchy veg: green leafy vegetables, cucumbers, broccoli, cauliflower, green string beans)  Supper: 3 oz lean protein (chicken/turkey breast or fish)  + non-starchy vegetables (non-starchy veg: green leafy vegetables, cucumbers, broccoli, cauliflower, green string beans)  Snack: 1 protein bar + non-starchy  vegetables (no calorie-containing dips/condiments)  Beverages: at least 48-64 oz water between meals daily (Powerade Zero or Propel Zero or Crystal Light or [place], less than 5 Calories per serving)    *Protein Shake Criteria: no more than 250 Calories, at least 20 grams of protein, and less than 10 grams of sugar     Frozen Meal Replacements  Healthy Choice  Lean Cuisine  Atkins Meals  Smart Ones    -Continue eating slowly (20-30 minutes per meal), chewing foods well (25 chews per bite/applesauce consistency)    -Increase exercise as able.  (Pool therapy, walking, etc)    -Continue to work on quitting smoking.       Follow-Up: follow-up with RD in 1 month.      Time spent with patient: 15 minutes     Velvet Mcallister RD, LD

## 2018-09-20 NOTE — PATIENT INSTRUCTIONS
[facility]         Internal Medicine Resident                   Continuity Clinic    Who We Are    Resident Continuity Clinic is a part of the [facility].  Resident physicians see patients independently and establish a relationship with them over the course of their three-year residency program.  As with the Primary Care Clinic, our Resident Continuity Clinic models a group practice.  If your doctor is not available, you will be able to see another resident physician.  At the end of a resident s training, patients will be transitioned to a new resident physician for ongoing care.     We treat patients with a wide array of medical needs from routine physicals, to acute illnesses, to diabetes and blood pressure management, to complex medical illness.  What is a Resident Physician?    Resident physicians hold medical degrees and are doctors. They are training to become specialists in Internal Medicine. They work under the supervision of board-certified faculty physicians.  Expectations for Your Care    We strive to provide accessible, quality care at all times.    In order to provide this care, it is best to see your primary care resident doctor consistently rather switching between providers.  In the event you do see another physician, you should schedule a follow-up visit with your usual primary care doctor.    If you are transitioning your care from another clinic, it is helpful to have your records available for your doctor to review.    We do not prescribe controlled substances, such as ADD medications or narcotic pain medications, on your first visit.  We will review your health records and concerns prior to devising a treatment plan with you in order to provide the best care.      Clinic Services     Extended clinic hours; patient  to help navigate your visit;  parking; laboratory and imaging services with evening and weekend hours    Multiple medical and  surgical specialties in one building    Complementary services, including Nutrition, Integrative Medicine, Pharmacy consultations, Mental and Behavioral Health, Sports Medicine and Physical Therapy    Thank You    We would like to thank you for choosing the [facility] Internal Medicine Resident Continuity Clinic for your primary care. You are making a priceless contribution to the training of the next generation of health care practitioners.     Contact us at [phone] for appointments or questions.    Resident Clinic Hours are Tuesdays and Thursdays, 7:30am-5:00pm    Residents  Sade Martini MD   (Female )   Paulette Arreola MD   (Female)   Juan Musa MD  (Male)   Micky Coley MD  (Male)   Yuly Cohen MD   (Female)   Kehinde Resendiz MD  (Male)    Johnson Mendes MD  (Male)   Isidro Alexander MD  (Male)   Micky Mcdonald MD (Male)   Yan Doshi MD  (Male)   Evelyn Esparza MD (Female)    Anjelica Olivares MD (Female)   Chucky White MD  (Male)   Kylie Lyn MD(Female)   Asha Hilton MD  (Female)    Supervising Physicians   MD Caitie Amaral MD Briar Duffy, MD James Langland, MD Mary Logeais, MD Tanya Melnik, MD Charles Moldow, MD Heather Thompson Buum, MD Kathleen Watson, MD          Primary Care Center Medication Refill Request Information:  * Please contact your pharmacy regarding ANY request for medication refills.  ** [facility] Prescription Fax = [phone]  * Please allow 3 business days for routine medication refills.  * Please allow 5 business days for controlled substance medication refills.     [facility] Test Result notification information:  *You will be notified with in 7-10 days of your appointment day regarding the results of your test.  If you are on MyChart you will be notified as soon as the provider has reviewed the results and signed off on them.    [facility]: [phone]     HOW TO QUIT  SMOKING  Smoking is one of the hardest habits to break. About half of all those who have ever smoked have been able to quit, and most of those (about 70%) who still smoke want to quit. Here are some of the best ways to stop smoking.     KEEP TRYING:  It takes most smokers about 8 tries before they are finally able to fully quit. So, the more often you try and fail, the better your chance of quitting the next time! So, don't give up!    GO COLD TURKEY:  Most ex-smokers quit cold turkey. Trying to cut back gradually doesn't seem to work as well, perhaps because it continues the smoking habit. Also, it is possible to fool yourself by inhaling more while smoking fewer cigarettes. This results in the same amount of nicotine in your body!    GET SUPPORT:  Support programs can make an important difference, especially for the heavy smoker. These groups offer lectures, methods to change your behavior and peer support. Call the free national Quitline for more information. 800-QUIT-NOW (460-931-7621). Low-cost or free programs are offered by many hospitals, local chapters of the American Lung Association (553-724-3156) and the American Cancer Society (916-773-6743). Support at home is important too. Non-smokers can help by offering praise and encouragement. If the smoker fails to quit, encourage them to try again!    OVER-THE-COUNTER MEDICINES:  For those who can't quit on their own, Nicotine Replacement Therapy (NRT) may make quitting much easier. Certain aids such as the nicotine patch, gum and lozenge are available without a prescription. However, it is best to use these under the guidance of your doctor. The skin patch provides a steady supply of nicotine to the body. Nicotine gum and lozenge gives temporary bursts of low levels of nicotine. Both methods take the edge off the craving for cigarettes. WARNING: If you feel symptoms of nicotine overdose, such as nausea, vomiting, dizziness, weakness, or fast heartbeat, stop  using these and see your doctor.    PRESCRIPTION MEDICINES:  After evaluating your smoking patterns and prior attempts at quitting, your doctor may offer a prescription medicine such as bupropion (Zyban, Wellbutrin), varenicline (Chantix, Champix), a niocotine inhaler or nasal spray. Each has its unique advantage and side effects which your doctor can review with you.    HEALTH BENEFITS OF QUITTING:  The benefits of quitting start right away and keep improving the longer you go without smokin minutes: blood pressure and pulse return to normal  8 hours: oxygen levels return to normal  2 days: ability to smell and taste begins to improve as damaged nerves start to regrow  2-3 weeks: circulation and lung function improves  1-9 months: decreased cough, congestion and shortness of breath; less tired  1 year: risk of heart attack decreases by half  5 years: risk of lung cancer decreases by half; risk of stroke becomes the same as a non-smoker  For information about how to quit smoking, visit the following links:  National Cancer [place] ,   Clearing the Air, Quit Smoking Today   - an online booklet. http://www.smokefree.gov/pubs/clearing_the_air.pdf  Smokefree.gov http://smokefree.gov/  QuitNet http://www.quitnet.com/    6203-0130 [employer] [facility], [address] [place] [address], [place] [address]. All rights reserved. This information is not intended as a substitute for professional medical care. Always follow your healthcare professional's instructions.    What we talked about today:   - Pap today, will be in touch via MyChart with results  - STI testing today  - Referral to pharmacy for smoking cessation  - Referral to dermatology for recurrent boils on abdomen  - Antifungal cream to use as needed  - Follow-up with Dr. Bernal in 4-6 weeks

## 2018-09-20 NOTE — LETTER
"9/20/2018       RE: Pricilla Brown  [address]  [facility] [address]     Dear Colleague,    Thank you for referring your patient, Pricilla Brown, to the [facility] SURGICAL WEIGHT MANAGEMENT at [facility]. Please see a copy of my visit note below.      Bariatric Nutrition Consultation Note    Reason For Visit: Nutrition Reassessment    Pricilla Brown is a 43 year-old (type 2 DM on insulin sliding scale) presenting today for bariatric nutrition consult.  Pt is interested in laparoscopic sleeve gastrectomy.  Pt has met all required RD visits prior to surgery.  Pt was referred by NANETTE Kerr and Dr. Silva Damon.    Patient signed ABN 9/20/18-9/20/19.    Coordination Notes:,   5/29/18: In light of it being several months before anticipating surgery, writer will revisit task list when Pt is closer to weight loss goal.  Writer will continue to encourage Pt to come at least monthly to see RD.    ANTHROPOMETRICS:  Ht: 67\"  Initial Wt: 462.6 lbs with BMI of 72.6  Current Wt: 454.6 lbs (-9 lbs over the past month; -2.6 lbs from initial weight)     Required weight loss goal pre-op: -46 lbs from initial consult weight (goal weight 416.6 lbs or less before surgery)    NUTRITION HISTORY:  Food Allergies/Intolerances: none known  Cravings: sweets, chips, pop (diet)  Triggers/cues causing extra eating: boredom (currently on disability, not working)  *Pt is on topiramate per Dr. Silva Damon who would like Pt to re-try modified liquid diet with 2 shakes/day.    Recent food recall:  Shake  Turkey sandwich on bread with apple and popcorn  Turkey burger on bun  Beverages- tea with splenda    Progress with Previous Goals:  Relating To Eating:  -Follow the Modified Liquid Diet for weight loss:  continues  Breakfast: Protein Shake (160-250 Jagjit, 20-35 g protein)  Lunch: 3 oz lean protein (chicken/turkey breast or fish)  + non-starchy vegetables (non-starchy veg: green " leafy vegetables, cucumbers, broccoli, cauliflower, green string beans)  Supper: 3 oz lean protein (chicken/turkey breast or fish)  + non-starchy vegetables (non-starchy veg: green leafy vegetables, cucumbers, broccoli, cauliflower, green string beans)  Snack: 1 protein bar + non-starchy vegetables (no calorie-containing dips/condiments)  Beverages: at least 48-64 oz water between meals daily (Powerade Zero or Propel Zero or Crystal Light or Isael, less than 5 Calories per serving)    *Protein Shake Criteria: no more than 250 Calories, at least 20 grams of protein, and less than 10 grams of sugar     Frozen Meal Replacements  Healthy Choice  Lean Cuisine  Atkins Meals  Smart Ones    -Continue eating slowly (20-30 minutes per meal), chewing foods well (25 chews per bite/applesauce  Consistency) continues    -Increase exercise as able.  (Pool therapy, walking, etc) pool and land therapy    -Continue to work on quitting smoking. 8-10 cigarettes per day     NUTRITION DIAGNOSIS:  Obesity r/t long history of self-monitoring deficit and excessive energy intake aeb BMI >30.- continues    INTERVENTION:  Intervention Provided/Education Provided:  Praised Pt on good progress with weight loss through positive changes made over the past month.  Pt report not being ready for 2 shakes/day.  Patient continues to go to land therapy and will start pool therapy soon.  She plans to see a pharmacist to get help with quitting smoking.  Gave encouragement and support.  Provided Pt with list of goals and task list.      Note: Pt stated she did not purchase clinic pharmaceutical grade meal replacements due to financial challenges.     Patient Understanding: good  Expected Compliance: good      GOALS:  Relating To Eating:  -Follow the Modified Liquid Diet for weight loss:    Breakfast: Protein Shake (160-250 Jagjit, 20-35 g protein)  Lunch: 3 oz lean protein (chicken/turkey breast or fish)  + non-starchy vegetables (non-starchy veg: green leafy  vegetables, cucumbers, broccoli, cauliflower, green string beans)  Supper: 3 oz lean protein (chicken/turkey breast or fish)  + non-starchy vegetables (non-starchy veg: green leafy vegetables, cucumbers, broccoli, cauliflower, green string beans)  Snack: 1 protein bar + non-starchy vegetables (no calorie-containing dips/condiments)  Beverages: at least 48-64 oz water between meals daily (Powerade Zero or Propel Zero or Crystal Light or Isael, less than 5 Calories per serving)    *Protein Shake Criteria: no more than 250 Calories, at least 20 grams of protein, and less than 10 grams of sugar     Frozen Meal Replacements  Healthy Choice  Lean Cuisine  Atkins Meals  Smart Ones    -Continue eating slowly (20-30 minutes per meal), chewing foods well (25 chews per bite/applesauce consistency)    -Increase exercise as able.  (Pool therapy, walking, etc)    -Continue to work on quitting smoking.       Follow-Up: follow-up with RD in 1 month.      Time spent with patient: 15 minutes     Velvet Mcallister RD, LD

## 2018-09-21 LAB
C TRACH DNA SPEC QL NAA+PROBE: NEGATIVE
N GONORRHOEA DNA SPEC QL NAA+PROBE: NEGATIVE
SPECIMEN SOURCE: NORMAL
SPECIMEN SOURCE: NORMAL

## 2018-09-25 LAB
COPATH REPORT: NORMAL
PAP: NORMAL

## 2018-09-26 LAB
FINAL DIAGNOSIS: NORMAL
HPV HR 12 DNA CVX QL NAA+PROBE: NEGATIVE
HPV16 DNA SPEC QL NAA+PROBE: NEGATIVE
HPV18 DNA SPEC QL NAA+PROBE: NEGATIVE
SPECIMEN DESCRIPTION: NORMAL
SPECIMEN SOURCE CVX/VAG CYTO: NORMAL

## 2018-10-08 ENCOUNTER — ANTICOAGULATION THERAPY VISIT (OUTPATIENT)
Dept: ANTICOAGULATION | Facility: CLINIC | Age: 43
End: 2018-10-08

## 2018-10-08 DIAGNOSIS — I48.91 ATRIAL FIBRILLATION, UNSPECIFIED TYPE (H): Primary | ICD-10-CM

## 2018-10-08 DIAGNOSIS — I48.91 ATRIAL FIBRILLATION, UNSPECIFIED TYPE (H): ICD-10-CM

## 2018-10-08 RX ORDER — WARFARIN SODIUM 5 MG/1
TABLET ORAL
Qty: 170 TABLET | Refills: 1 | Status: SHIPPED | OUTPATIENT
Start: 2018-10-08 | End: 2018-12-30

## 2018-10-08 NOTE — PROGRESS NOTES
Pt called to say that she was unable to refill her coumadin this weekend, and missed doses on Sat and Sun.  I sent in a refill order today for her coumadin, and patient will take increased doses the next two days, then her usual dose for two days, then INR on 10/12.

## 2018-10-08 NOTE — MR AVS SNAPSHOT
Pricilla Brown   10/8/2018   Anticoagulation Therapy Visit    Description:  43 year old female   Provider:  Dorene Edge, RN   Department:  [facility]           INR as of 10/8/2018     Today's INR No new INR was available at the time of this encounter.      Anticoagulation Summary as of 10/8/2018     INR goal 2.0-2.5   Today's INR No new INR was available at the time of this encounter.   Full warfarin instructions 10/8: 20 mg; 10/9: 20 mg; Otherwise 20 mg on Wed, Fri; 15 mg all other days   Next INR check 10/12/2018    Indications   Atrial fibrillation (H) [I48.91] [I48.91]  Long-term (current) use of anticoagulants [Z79.01] [Z79.01]         October 2018 Details    Sun Mon Tue Wed Thu Fri Sat      1               2               3               4               5               6                 7               8      20 mg   See details      9      20 mg         10      20 mg         11      15 mg         12            13                 14               15               16               17               18               19               20                 21               22               23               24               25               26               27                 28               29               30               31                   Date Details   10/08 This INR check       Date of next INR:  10/12/2018         How to take your warfarin dose     To take:  15 mg Take 3 of the 5 mg tablets.    To take:  20 mg Take 4 of the 5 mg tablets.

## 2018-10-09 ENCOUNTER — OFFICE VISIT (OUTPATIENT)
Dept: ENDOCRINOLOGY | Facility: CLINIC | Age: 43
End: 2018-10-09
Payer: MEDICARE

## 2018-10-09 VITALS
HEART RATE: 85 BPM | HEIGHT: 67 IN | OXYGEN SATURATION: 100 % | WEIGHT: 293 LBS | DIASTOLIC BLOOD PRESSURE: 87 MMHG | BODY MASS INDEX: 45.99 KG/M2 | SYSTOLIC BLOOD PRESSURE: 144 MMHG

## 2018-10-09 DIAGNOSIS — E11.42 TYPE 2 DIABETES MELLITUS WITH DIABETIC POLYNEUROPATHY, WITHOUT LONG-TERM CURRENT USE OF INSULIN (H): Primary | ICD-10-CM

## 2018-10-09 DIAGNOSIS — E66.01 MORBID OBESITY (H): ICD-10-CM

## 2018-10-09 DIAGNOSIS — E78.5 HYPERLIPIDEMIA LDL GOAL <100: ICD-10-CM

## 2018-10-09 RX ORDER — TOPIRAMATE 50 MG/1
100 TABLET, FILM COATED ORAL DAILY
Qty: 270 TABLET | Refills: 3 | Status: SHIPPED | OUTPATIENT
Start: 2018-10-09 | End: 2019-04-11

## 2018-10-09 RX ORDER — TOPIRAMATE 25 MG/1
TABLET, FILM COATED ORAL
Qty: 360 TABLET | Refills: 2 | Status: CANCELLED | OUTPATIENT
Start: 2018-10-09

## 2018-10-09 ASSESSMENT — ENCOUNTER SYMPTOMS
POSTURAL DYSPNEA: 1
SWOLLEN GLANDS: 0
WEAKNESS: 0
FEVER: 0
EYE PAIN: 0
MEMORY LOSS: 0
EYE WATERING: 1
VOMITING: 0
WEIGHT GAIN: 0
LIGHT-HEADEDNESS: 0
INCREASED ENERGY: 1
RECTAL PAIN: 0
WHEEZING: 0
CONSTIPATION: 0
HYPOTENSION: 0
COUGH: 0
EXERCISE INTOLERANCE: 0
FATIGUE: 1
JOINT SWELLING: 0
NERVOUS/ANXIOUS: 0
BACK PAIN: 1
SHORTNESS OF BREATH: 1
SKIN CHANGES: 0
COUGH DISTURBING SLEEP: 0
TINGLING: 0
SMELL DISTURBANCE: 0
HYPERTENSION: 1
BREAST MASS: 0
MYALGIAS: 0
BREAST PAIN: 0
SORE THROAT: 0
DYSURIA: 0
MUSCLE CRAMPS: 0
DECREASED LIBIDO: 0
NAUSEA: 0
POOR WOUND HEALING: 0
DEPRESSION: 0
POLYPHAGIA: 1
BRUISES/BLEEDS EASILY: 0
TROUBLE SWALLOWING: 0
DIFFICULTY URINATING: 0
ABDOMINAL PAIN: 0
HEARTBURN: 0
BLOATING: 0
DOUBLE VISION: 0
HEMOPTYSIS: 0
EXTREMITY NUMBNESS: 0
ALTERED TEMPERATURE REGULATION: 0
DECREASED APPETITE: 0
SYNCOPE: 0
HOT FLASHES: 0
SEIZURES: 0
LEG PAIN: 1
BLOOD IN STOOL: 0
PALPITATIONS: 1
SINUS CONGESTION: 0
SLEEP DISTURBANCES DUE TO BREATHING: 1
CLAUDICATION: 0
DIARRHEA: 0
DECREASED CONCENTRATION: 0
ARTHRALGIAS: 0
NECK MASS: 0
PARALYSIS: 0
HEADACHES: 0
TACHYCARDIA: 1
LEG SWELLING: 1
NECK PAIN: 0
SNORES LOUDLY: 0
NAIL CHANGES: 0
EYE REDNESS: 1
ORTHOPNEA: 1
CHILLS: 0
SPEECH CHANGE: 0
WEIGHT LOSS: 0
JAUNDICE: 0
DYSPNEA ON EXERTION: 1
RECTAL BLEEDING: 0
SPUTUM PRODUCTION: 0
DISTURBANCES IN COORDINATION: 0
MUSCLE WEAKNESS: 1
PANIC: 0
LOSS OF CONSCIOUSNESS: 0
INSOMNIA: 0
RESPIRATORY PAIN: 0
FLANK PAIN: 0
BOWEL INCONTINENCE: 0
HALLUCINATIONS: 0
DIZZINESS: 0
NIGHT SWEATS: 0
TREMORS: 0
NUMBNESS: 0
STIFFNESS: 0
EYE IRRITATION: 1
HEMATURIA: 0
SINUS PAIN: 0
HOARSE VOICE: 0
POLYDIPSIA: 1
TASTE DISTURBANCE: 0

## 2018-10-09 NOTE — PATIENT INSTRUCTIONS
- increase topiramate to 100 mg daily for 1 month and then increase to 150 mg daily  - lab on Thursday this week  - Please take a look at this website for resources and recipes [email or web address]  - see Steph in December  - see me in 4 months    If you have any questions, please do not hesitate to call Weight management clinic at [phone] or [phone].    Sincerely,    Silva Damon MD  Endocrinology

## 2018-10-09 NOTE — LETTER
"10/9/2018       RE: Pricilla Brown  [address]  [facility] [address]     Dear Colleague,    Thank you for referring your patient, Pricilla Brown, to the [facility] MEDICAL WEIGHT MANAGEMENT at [facility]. Please see a copy of my visit note below.    Return Medical Weight Management Note     Pricilla Brown  MRN:  [id number]  :  1975  JUANITO:  10/9/2018    Dear Dolores, Jamilah Ramirez,    I had the pleasure of seeing your patient Pricilla Brown.  She is a 43 year old female who I am continuing to see for treatment of obesity related to: type 2 diabetes, morbid obesity, dilated cardiomyopathy with diastolic dysfunction, atrial fibrillation, hx of PE, HTN, hypothyroidism s/p ROGER for Grave's disease and sleep apnea.     She sees Thelma Archibald for [facility] diabetes at [facility].   She was seen by myself at [facility]. Last seen  and Steph Kim for evaluation of bariatric surgery. However, she has struggling with prebariatric weight loss.     INTERVAL HISTORY:  Here for [facility] follow up. Last seen 2018.    She is currently on taking Trulicity 1.5 mg weekly, bupropion 100 mg bid, Tuojeo 170 units in the morning, Novolog 65 units plus correction. She is on topiramate 75 mg daily. She felt it worked in the past but not anymore. She could not tolerate metformin.     A1C 9.6 in 2018. She followed up with Thelma Archibald. She will see her in 2 days.    She said that she is moving around more often. She is working toward bariatric surgery and will have appointment with dietitian in 1 month and Steph Kim in 2 months.    CURRENT WEIGHT:   456 lbs 4.8 oz    Wt Readings from Last 4 Encounters:   10/09/18 (!) 207 kg (456 lb 4.8 oz)   18 (!) 206.8 kg (456 lb)   18 (!) 210.7 kg (464 lb 9.6 oz)   18 (!) 212.4 kg (468 lb 3.2 oz)       Height:  5' 7\"  Body Mass Index:  Body mass index is 71.47 kg/(m^2).  Vitals:  /87  Pulse 85  Ht 1.702 m (5' 7\")  Wt (!) 207 " kg (456 lb 4.8 oz)  LMP  (LMP Unknown)  SpO2 100%  BMI 71.47 kg/m2    Initial consult weight was 466 on bariatric consult.  Weight change since last seen on 5/29/18 was 10  Total lost 10 pounds.    Review of Systems     Constitutional:  Positive for fatigue and increased energy. Negative for fever, chills, weight loss, weight gain, decreased appetite, night sweats, recent stressors, height loss, post-operative complications, incisional pain, hallucinations, hyperactivity and confused.   HENT:  Negative for ear pain, hearing loss, tinnitus, nosebleeds, trouble swallowing, hoarse voice, mouth sores, sore throat, ear discharge, tooth pain, gum tenderness, taste disturbance, smell disturbance, hearing aid, bleeding gums, dry mouth, sinus pain, sinus congestion and neck mass.    Eyes:  Positive for redness, eye watering and eye irritation. Negative for double vision, pain, eye pain, decreased vision, eye bulging, eye dryness, flashing lights, spots, floaters, strabismus, tunnel vision and jaundice.   Respiratory:   Positive for shortness of breath, sleep disturbances due to breathing, dyspnea on exertion and postural dyspnea. Negative for cough, hemoptysis, sputum production, wheezing, snores loudly, respiratory pain and cough disturbing sleep.    Cardiovascular:  Positive for chest pain, dyspnea on exertion, palpitations, orthopnea, leg swelling, hypertension, chest pain on exertion, leg pain, sleep disturbances due to breathing, tachycardia and edema. Negative for claudication, fingers/toes turn blue, hypotension, syncope, history of heart murmur, chest pain at rest, pacemaker, few scattered varicosities, light-headedness and exercise intolerance.   Gastrointestinal:  Negative for heartburn, nausea, vomiting, abdominal pain, diarrhea, constipation, blood in stool, melena, rectal pain, bloating, hemorrhoids, bowel incontinence, jaundice, rectal bleeding, coffee ground emesis and change in stool.   Genitourinary:   Negative for bladder incontinence, dysuria, urgency, hematuria, flank pain, vaginal discharge, difficulty urinating, genital sores, dyspareunia, decreased libido, nocturia, voiding less frequently, arousal difficulty, abnormal vaginal bleeding, excessive menstruation, menstrual changes, hot flashes, vaginal dryness and postmenopausal bleeding.   Musculoskeletal:  Positive for back pain and muscle weakness. Negative for myalgias, joint swelling, arthralgias, stiffness, muscle cramps, neck pain, bone pain and fracture.   Skin:  Negative for nail changes, itching, poor wound healing, rash, hair changes, skin changes, acne, warts, poor wound healing, scarring, flaky skin, Raynaud's phenomenon, sensitivity to sunlight and skin thickening.   Neurological:  Negative for dizziness, tingling, tremors, speech change, seizures, loss of consciousness, weakness, light-headedness, numbness, headaches, disturbances in coordination, extremity numbness, memory loss, difficulty walking and paralysis.   Endo/Heme:  Negative for anemia, swollen glands and bruises/bleeds easily.   Psychiatric/Behavioral:  Negative for depression, hallucinations, memory loss, decreased concentration, mood swings and panic attacks.    Breast:  Negative for breast discharge, breast mass, breast pain and nipple retraction.   Endocrine:  Positive for polyphagia and polydipsia.Negative for altered temperature regulation, unwanted hair growth and change in facial hair.      MEDICATIONS:   Current Outpatient Prescriptions   Medication Sig Dispense Refill     acetaminophen (TYLENOL) 325 MG tablet Take 2 tablets (650 mg) by mouth 3 times daily as needed for mild pain or fever (total acetaminophen dose should not exceed 3000 mg per day) 180 tablet 5     albuterol (PROAIR HFA, PROVENTIL HFA, VENTOLIN HFA) 108 (90 BASE) MCG/ACT inhaler Inhale 2 puffs into the lungs every 6 hours as needed. 1 Inhaler 11     bisacodyl (DULCOLAX) 10 MG suppository Place 1 suppository  (10 mg) rectally daily as needed for constipation 6 suppository 0     blood glucose (NO BRAND SPECIFIED) lancets standard USE TO CHECK  blood sugar fasting each am  prelunch and predinner daily OR AS DIRECTED Call clinic to schedule MD APPT. 100 each 0     blood glucose monitoring (NO BRAND SPECIFIED) meter device kit Use to test blood sugar 3 times daily or as directed. 1 kit 1     blood glucose monitoring (NO BRAND SPECIFIED) test strip Use to test blood sugars 3 times daily or as directed 100 strip 11     Blood Glucose Monitoring Suppl (IBG STAR) W/DEVICE KIT -PLEASE GIVE PATIENT A DEVICE HER INSURANCE WILL COVER- 1 kit 0     buPROPion (WELLBUTRIN SR) 100 MG 12 hr tablet Take 1 tablet (100 mg) by mouth 2 times daily 180 tablet 1     capsaicin (ZOSTRIX) 0.025 % CREA Apply 1 g topically 3 times daily as needed 1 Tube 0     ciclopirox 8 % SOLN Externally apply topically daily To toenails. 1 Bottle 11     clotrimazole (LOTRIMIN) 1 % cream Apply topically 2 times daily as needed (skin irritation) 60 g 1     clotrimazole (LOTRIMIN) 1 % cream Apply topically 2 times daily 30 g 1     colchicine (COLCYRS) 0.6 MG tablet Take 1-2 tablets (0.6-1.2 mg) by mouth daily as needed for moderate pain *Call for appointment [phone]. Over 12 months since last seen. 30 tablet 0     diclofenac (VOLTAREN) 1 % GEL topical gel Apply 4 grams to knees or 2 grams to hands four times daily using enclosed dosing card. 100 g 1     dulaglutide (TRULICITY) 1.5 MG/0.5ML pen Inject 1.5 mg Subcutaneous every 7 days 45 mL 3     DULoxetine (CYMBALTA) 20 MG capsule Take 1 capsule (20 mg) by mouth 2 times daily 60 capsule 0     Efinaconazole 10 % SOLN Externally apply topically daily To toenails. 8 mL 11     gabapentin (NEURONTIN) 300 MG capsule Take 2 capsules (600 mg) by mouth 2 times daily Needs PCP appt for further refills 120 capsule 0     hydrALAZINE (APRESOLINE) 25 MG tablet Take 50-75 mg by mouth Take 2 tabs (50 mg) in am, 2 tabs (50 mg)  midday, and 3 tabs (75 mg) in pm daily  450 tablet 3     insulin glargine U-300 (TOUJEO) 300 UNIT/ML injection Inject 170 units SQ each am. 30 mL 3     insulin pen needle (BD RIVER U/F) 32G X 4 MM Use 6 daily or as directed* Call clinic to schedule  MD APPT. 200 each 0     levothyroxine (SYNTHROID/LEVOTHROID) 200 MCG tablet Take 1 tab Monday thru Saturday and 2 tabs on Sundays. 102 tablet 3     metolazone (ZAROXOLYN) 2.5 MG tablet Take 1 tablet (2.5 mg) by mouth 2 times daily Take 1/2 hour prior to torsemide 60 tablet 1     metoprolol succinate (TOPROL-XL) 100 MG 24 hr tablet Take 1 tablet (100 mg) by mouth daily 90 tablet 3     morphine (MS CONTIN) 15 MG 12 hr tablet Take 15 mg by mouth daily as needed       NOVOLOG FLEXPEN 100 UNIT/ML soln Inject 65 units with meals plus correction. Pt uses approx 190 units in 24 hrs. 180 mL 3     nystatin (MYCOSTATIN) cream Apply topically 2 times daily To toenails 90 g 6     order for DME Left foot 1 Units 0     order for DME Equipment being ordered: [id number] $92   Plantar, fasciitis, LG, night splint 1 each 0     order for DME Equipment being ordered: Challenger Wide walker if available - patient needs seat, basket and brakes. 1 each 0     ORDER FOR DME Use your CPAP device as directed by your provider. Pressure change to min 13 max 18cwp 1 each 99     oxyCODONE-acetaminophen (PERCOCET) 5-325 MG per tablet Take 1 tablet by mouth every 8 hours as needed for moderate to severe pain (try to limit use, no further prescriptions until seen in pain clinic) 60 tablet 0     polyethylene glycol (MIRALAX/GLYCOLAX) powder        potassium chloride SA (K-DUR/KLOR-CON M) 20 MEQ CR tablet TAKE TWO TABLETS BY MOUTH FOUR TIMES A  tablet 11     senna (SENOKOT) 8.6 MG tablet Take 1 tablet by mouth 2 times daily 45 tablet 0     spironolactone (ALDACTONE) 50 MG tablet Take 1 tablet (50 mg) by mouth daily 30 tablet 11     topiramate (TOPAMAX) 25 MG tablet 25 mg at bedtime for 1 week,  50 mg at bedtime for 1 week and 75 mg daily at bedtime thereafter 360 tablet 2     torsemide (DEMADEX) 100 MG tablet Take 1 tablet (100 mg) by mouth 2 times daily 180 tablet 1     warfarin (COUMADIN) 5 MG tablet [id number] on MTuThSatSun, and 20mg on WF, or as directed by the [facility] at the [facility] 170 tablet 1     warfarin (COUMADIN) 5 MG tablet Take one to two tablets daily or as directed by the Coumadin Clinic 90 tablet 3     nicotine ([employer] NICOTINE) 14 MG/24HR patch 2h hr Place 1 patch onto the skin every 24 hours (Patient not taking: Reported on 7/25/2018) 30 patch 1     Respiratory Therapy Supplies (NEBULIZER COMPRESSOR) KIT 1 Device 4 times daily as needed. (Patient not taking: Reported on 7/25/2018) 1 kit 3     tiotropium (SPIRIVA HANDIHALER) 18 MCG inhalation capsule Inhale contents of one capsule daily. (Patient not taking: Reported on 7/25/2018) 30 capsule 1       ASSESSMENT:   Pricilla Brown is a 43 years old female with hx of type 2 diabetes, morbid obesity, dilated cardiomyopathy with diastolic dysfunction, atrial fibrillation, hx of PE, HTN, hypothyroidism s/p ROGER for Grave's disease and sleep apnea who here for [facility] follow up.  She is interested in sleeve gastrectomy and struggling to lose 46 lbs before surgery.     +strong craving on sweet  Lack of exercise due to back pain, ankle pain    Responded well to topiramate    PLAN:   Increase topiramate to 100 mg for 1 month and then increase to 150 mg thereafter  Continue to work with dietitian regularly (monthly)  Continue with Trulicity 1.5 mg weekly  Check TSH, A1c this week  F/u with Thelma Archibald for type 2 diabetes    Pool therapy (referal made by cardiologist)    FOLLOW-UP:    Dietitian monthly   2 months with Steph Kim  4 months with me    Time: 25 min spent on evaluation, management, counseling, education, & motivational interviewing with greater than 50 % of the total time was spent on counseling and coordinating  care    Sincerely,    Silva Damon MD

## 2018-10-09 NOTE — PROGRESS NOTES
"Return Medical Weight Management Note     Pricilla Brown  MRN:  [id number]  :  1975  JUANITO:  10/9/2018    Dear Dolores, Jamilah Ramirez,    I had the pleasure of seeing your patient Pricilla Brown.  She is a 43 year old female who I am continuing to see for treatment of obesity related to: type 2 diabetes, morbid obesity, dilated cardiomyopathy with diastolic dysfunction, atrial fibrillation, hx of PE, HTN, hypothyroidism s/p ROGER for Grave's disease and sleep apnea.     She sees Thelma Archibald for type 2 diabetes at [facility].   She was seen by myself at [facility]. Last seen  and Steph Kim for evaluation of bariatric surgery. However, she has struggling with prebariatric weight loss.     INTERVAL HISTORY:  Here for [facility] follow up. Last seen 2018.    She is currently on taking Trulicity 1.5 mg weekly, bupropion 100 mg bid, Tuojeo 170 units in the morning, Novolog 65 units plus correction. She is on topiramate 75 mg daily. She felt it worked in the past but not anymore. She could not tolerate metformin.     A1C 9.6 in 2018. She followed up with Thelma Archibald. She will see her in 2 days.    She said that she is moving around more often. She is working toward bariatric surgery and will have appointment with dietitian in 1 month and Steph Kim in 2 months.    CURRENT WEIGHT:   456 lbs 4.8 oz    Wt Readings from Last 4 Encounters:   10/09/18 (!) 207 kg (456 lb 4.8 oz)   18 (!) 206.8 kg (456 lb)   18 (!) 210.7 kg (464 lb 9.6 oz)   18 (!) 212.4 kg (468 lb 3.2 oz)       Height:  5' 7\"  Body Mass Index:  Body mass index is 71.47 kg/(m^2).  Vitals:  /87  Pulse 85  Ht 1.702 m (5' 7\")  Wt (!) 207 kg (456 lb 4.8 oz)  LMP  (LMP Unknown)  SpO2 100%  BMI 71.47 kg/m2    Initial consult weight was 466 on bariatric consult.  Weight change since last seen on 18 was 10  Total lost 10 pounds.    Review of Systems     Constitutional:  Positive for fatigue and increased energy. Negative for " fever, chills, weight loss, weight gain, decreased appetite, night sweats, recent stressors, height loss, post-operative complications, incisional pain, hallucinations, hyperactivity and confused.   HENT:  Negative for ear pain, hearing loss, tinnitus, nosebleeds, trouble swallowing, hoarse voice, mouth sores, sore throat, ear discharge, tooth pain, gum tenderness, taste disturbance, smell disturbance, hearing aid, bleeding gums, dry mouth, sinus pain, sinus congestion and neck mass.    Eyes:  Positive for redness, eye watering and eye irritation. Negative for double vision, pain, eye pain, decreased vision, eye bulging, eye dryness, flashing lights, spots, floaters, strabismus, tunnel vision and jaundice.   Respiratory:   Positive for shortness of breath, sleep disturbances due to breathing, dyspnea on exertion and postural dyspnea. Negative for cough, hemoptysis, sputum production, wheezing, snores loudly, respiratory pain and cough disturbing sleep.    Cardiovascular:  Positive for chest pain, dyspnea on exertion, palpitations, orthopnea, leg swelling, hypertension, chest pain on exertion, leg pain, sleep disturbances due to breathing, tachycardia and edema. Negative for claudication, fingers/toes turn blue, hypotension, syncope, history of heart murmur, chest pain at rest, pacemaker, few scattered varicosities, light-headedness and exercise intolerance.   Gastrointestinal:  Negative for heartburn, nausea, vomiting, abdominal pain, diarrhea, constipation, blood in stool, melena, rectal pain, bloating, hemorrhoids, bowel incontinence, jaundice, rectal bleeding, coffee ground emesis and change in stool.   Genitourinary:  Negative for bladder incontinence, dysuria, urgency, hematuria, flank pain, vaginal discharge, difficulty urinating, genital sores, dyspareunia, decreased libido, nocturia, voiding less frequently, arousal difficulty, abnormal vaginal bleeding, excessive menstruation, menstrual changes, hot  flashes, vaginal dryness and postmenopausal bleeding.   Musculoskeletal:  Positive for back pain and muscle weakness. Negative for myalgias, joint swelling, arthralgias, stiffness, muscle cramps, neck pain, bone pain and fracture.   Skin:  Negative for nail changes, itching, poor wound healing, rash, hair changes, skin changes, acne, warts, poor wound healing, scarring, flaky skin, Raynaud's phenomenon, sensitivity to sunlight and skin thickening.   Neurological:  Negative for dizziness, tingling, tremors, speech change, seizures, loss of consciousness, weakness, light-headedness, numbness, headaches, disturbances in coordination, extremity numbness, memory loss, difficulty walking and paralysis.   Endo/Heme:  Negative for anemia, swollen glands and bruises/bleeds easily.   Psychiatric/Behavioral:  Negative for depression, hallucinations, memory loss, decreased concentration, mood swings and panic attacks.    Breast:  Negative for breast discharge, breast mass, breast pain and nipple retraction.   Endocrine:  Positive for polyphagia and polydipsia.Negative for altered temperature regulation, unwanted hair growth and change in facial hair.      MEDICATIONS:   Current Outpatient Prescriptions   Medication Sig Dispense Refill     acetaminophen (TYLENOL) 325 MG tablet Take 2 tablets (650 mg) by mouth 3 times daily as needed for mild pain or fever (total acetaminophen dose should not exceed 3000 mg per day) 180 tablet 5     albuterol (PROAIR HFA, PROVENTIL HFA, VENTOLIN HFA) 108 (90 BASE) MCG/ACT inhaler Inhale 2 puffs into the lungs every 6 hours as needed. 1 Inhaler 11     bisacodyl (DULCOLAX) 10 MG suppository Place 1 suppository (10 mg) rectally daily as needed for constipation 6 suppository 0     blood glucose (NO BRAND SPECIFIED) lancets standard USE TO CHECK  blood sugar fasting each am  prelunch and predinner daily OR AS DIRECTED Call clinic to schedule MD APPT. 100 each 0     blood glucose monitoring (NO BRAND  SPECIFIED) meter device kit Use to test blood sugar 3 times daily or as directed. 1 kit 1     blood glucose monitoring (NO BRAND SPECIFIED) test strip Use to test blood sugars 3 times daily or as directed 100 strip 11     Blood Glucose Monitoring Suppl (IBG STAR) W/DEVICE KIT -PLEASE GIVE PATIENT A DEVICE HER INSURANCE WILL COVER- 1 kit 0     buPROPion (WELLBUTRIN SR) 100 MG 12 hr tablet Take 1 tablet (100 mg) by mouth 2 times daily 180 tablet 1     capsaicin (ZOSTRIX) 0.025 % CREA Apply 1 g topically 3 times daily as needed 1 Tube 0     ciclopirox 8 % SOLN Externally apply topically daily To toenails. 1 Bottle 11     clotrimazole (LOTRIMIN) 1 % cream Apply topically 2 times daily as needed (skin irritation) 60 g 1     clotrimazole (LOTRIMIN) 1 % cream Apply topically 2 times daily 30 g 1     colchicine (COLCYRS) 0.6 MG tablet Take 1-2 tablets (0.6-1.2 mg) by mouth daily as needed for moderate pain *Call for appointment [phone]. Over 12 months since last seen. 30 tablet 0     diclofenac (VOLTAREN) 1 % GEL topical gel Apply 4 grams to knees or 2 grams to hands four times daily using enclosed dosing card. 100 g 1     dulaglutide (TRULICITY) 1.5 MG/0.5ML pen Inject 1.5 mg Subcutaneous every 7 days 45 mL 3     DULoxetine (CYMBALTA) 20 MG capsule Take 1 capsule (20 mg) by mouth 2 times daily 60 capsule 0     Efinaconazole 10 % SOLN Externally apply topically daily To toenails. 8 mL 11     gabapentin (NEURONTIN) 300 MG capsule Take 2 capsules (600 mg) by mouth 2 times daily Needs PCP appt for further refills 120 capsule 0     hydrALAZINE (APRESOLINE) 25 MG tablet Take 50-75 mg by mouth Take 2 tabs (50 mg) in am, 2 tabs (50 mg) midday, and 3 tabs (75 mg) in pm daily  450 tablet 3     insulin glargine U-300 (TOUJEO) 300 UNIT/ML injection Inject 170 units SQ each am. 30 mL 3     insulin pen needle (BD RIVER U/F) 32G X 4 MM Use 6 daily or as directed* Call clinic to schedule  MD APPT. 200 each 0     levothyroxine  (SYNTHROID/LEVOTHROID) 200 MCG tablet Take 1 tab Monday thru Saturday and 2 tabs on Sundays. 102 tablet 3     metolazone (ZAROXOLYN) 2.5 MG tablet Take 1 tablet (2.5 mg) by mouth 2 times daily Take 1/2 hour prior to torsemide 60 tablet 1     metoprolol succinate (TOPROL-XL) 100 MG 24 hr tablet Take 1 tablet (100 mg) by mouth daily 90 tablet 3     morphine (MS CONTIN) 15 MG 12 hr tablet Take 15 mg by mouth daily as needed       NOVOLOG FLEXPEN 100 UNIT/ML soln Inject 65 units with meals plus correction. Pt uses approx 190 units in 24 hrs. 180 mL 3     nystatin (MYCOSTATIN) cream Apply topically 2 times daily To toenails 90 g 6     order for DME Left foot 1 Units 0     order for DME Equipment being ordered: [id number] $92   Plantar, fasciitis, LG, night splint 1 each 0     order for DME Equipment being ordered: Challenger Wide walker if available - patient needs seat, basket and brakes. 1 each 0     ORDER FOR DME Use your CPAP device as directed by your provider. Pressure change to min 13 max 18cwp 1 each 99     oxyCODONE-acetaminophen (PERCOCET) 5-325 MG per tablet Take 1 tablet by mouth every 8 hours as needed for moderate to severe pain (try to limit use, no further prescriptions until seen in pain clinic) 60 tablet 0     polyethylene glycol (MIRALAX/GLYCOLAX) powder        potassium chloride SA (K-DUR/KLOR-CON M) 20 MEQ CR tablet TAKE TWO TABLETS BY MOUTH FOUR TIMES A  tablet 11     senna (SENOKOT) 8.6 MG tablet Take 1 tablet by mouth 2 times daily 45 tablet 0     spironolactone (ALDACTONE) 50 MG tablet Take 1 tablet (50 mg) by mouth daily 30 tablet 11     topiramate (TOPAMAX) 25 MG tablet 25 mg at bedtime for 1 week, 50 mg at bedtime for 1 week and 75 mg daily at bedtime thereafter 360 tablet 2     torsemide (DEMADEX) 100 MG tablet Take 1 tablet (100 mg) by mouth 2 times daily 180 tablet 1     warfarin (COUMADIN) 5 MG tablet [id number] on MTuThSatSun, and 20mg on WF, or as directed by the Med[facility] at the [facility] 170 tablet 1     warfarin (COUMADIN) 5 MG tablet Take one to two tablets daily or as directed by the Coumadin Clinic 90 tablet 3     nicotine ([employer] NICOTINE) 14 MG/24HR patch 2h hr Place 1 patch onto the skin every 24 hours (Patient not taking: Reported on 7/25/2018) 30 patch 1     Respiratory Therapy Supplies (NEBULIZER COMPRESSOR) KIT 1 Device 4 times daily as needed. (Patient not taking: Reported on 7/25/2018) 1 kit 3     tiotropium (SPIRIVA HANDIHALER) 18 MCG inhalation capsule Inhale contents of one capsule daily. (Patient not taking: Reported on 7/25/2018) 30 capsule 1       ASSESSMENT:   Pricilla Brown is a 43 years old female with hx of type 2 diabetes, morbid obesity, dilated cardiomyopathy with diastolic dysfunction, atrial fibrillation, hx of PE, HTN, hypothyroidism s/p ROGER for Grave's disease and sleep apnea who here for [facility] follow up.  She is interested in sleeve gastrectomy and struggling to lose 46 lbs before surgery.     +strong craving on sweet  Lack of exercise due to back pain, ankle pain    Responded well to topiramate    PLAN:   Increase topiramate to 100 mg for 1 month and then increase to 150 mg thereafter  Continue to work with dietitian regularly (monthly)  Continue with Trulicity 1.5 mg weekly  Check TSH, A1c this week  F/u with Thelma Archibald for type 2 diabetes    Pool therapy (referal made by cardiologist)    FOLLOW-UP:    Dietitian monthly   2 months with Steph Kim  4 months with me    Time: 25 min spent on evaluation, management, counseling, education, & motivational interviewing with greater than 50 % of the total time was spent on counseling and coordinating care    Sincerely,    Silva Damon MD

## 2018-10-09 NOTE — MR AVS SNAPSHOT
After Visit Summary   10/9/2018    Pricilla Brown    MRN: [id number]           Patient Information     Date Of Birth          1975        Visit Information        Provider Department      10/9/2018 11:15 AM Silva Damon MD M [facility]        Today's Diagnoses     Morbid obesity (H)          Care Instructions    - increase topiramate to 100 mg daily for 1 month and then increase to 150 mg daily  - lab on Thursday this week  - Please take a look at this website for resources and recipes [email or web address]  - see Steph in December  - see me in 4 months    If you have any questions, please do not hesitate to call Weight management clinic at [phone] or [phone].    Sincerely,    Silva Damon MD  Endocrinology            Follow-ups after your visit        Your next 10 appointments already scheduled     Oct 11, 2018 11:30 AM CDT   (Arrive by 11:15 AM)   RETURN DIABETES with Isela Archibald PA-C   [facility] ([facility])    [address] [place] [address] [phone]            Oct 18, 2018  7:30 AM CDT   Return Visit with Norman Jean DPM   [facility] ([facility])    [address] [place] [address] [phone]            Oct 18, 2018  2:00 PM CDT   Return Sleep Patient with YOCASTA Horan [facility] [place] ([facility])    [address] [facility] [address] [phone]            Oct 25, 2018 11:30 AM CDT   (Arrive by 11:15 AM)   RETURN HEART FAILURE with Percy Alcala MD   [facility] ([facility])    [address] [place] [address] [phone]            Oct 30, 2018 12:30 PM CDT   (Arrive by 12:15 PM)   Return Weight Management Visit with Velvet FREEMAN  YARY Mcallister   [facility] ([facility] [place])    [address] [id number] [phone]            Nov 01, 2018 12:55 PM CDT   (Arrive by 12:40 PM)   Return Visit with Jamilah Bernal MD   [facility] ([facility])    [address] [id number] [phone]            Dec 03, 2018 12:30 PM CST   (Arrive by 12:15 PM)   Return Weight Management Visit with Steph Kim PA-C   [facility] ([facility] [place])    [address] [id number] [phone]              Who to contact     Please call your clinic at [phone] to:    Ask questions about your health    Make or cancel appointments    Discuss your medicines    Learn about your test results    Speak to your doctor            Additional Information About Your Visit        [employer] Information     [employer] gives you secure access to your electronic health record. If you see a primary care provider, you can also send messages to your care team and make appointments. If you have questions, please call your primary care clinic.  If you do not have a primary care provider, please call [phone] and they will assist you.      [employer] is an electronic gateway that provides easy, online access to your medical records. With [employer], you can request a clinic appointment, read your test results, renew a prescription or communicate with your care team.     To access your existing account, please contact your [facility] or call [phone] for assistance.        Care EveryWhere ID     This is your Care EveryWhere ID. This could be used by other organizations to access your [place] medical records  [id number]        Your Vitals Were     Pulse Height Last Period Pulse Oximetry BMI (Body Mass Index)       85  "1.702 m (5' 7\") (LMP Unknown) 100% 71.47 kg/m2        Blood Pressure from Last 3 Encounters:   10/09/18 144/87   09/20/18 135/82   09/11/18 123/82    Weight from Last 3 Encounters:   10/09/18 (!) 207 kg (456 lb 4.8 oz)   09/20/18 (!) 206.8 kg (456 lb)   09/11/18 (!) 210.7 kg (464 lb 9.6 oz)              Today, you had the following     No orders found for display       Primary Care Provider Office Phone # Fax #    Jamilah Bernal [phone]       0 [address] [address]        Equal Access to Services     KIERSTEN DRIVER : Maryam oviedoo Sorosmery, waaxda luqadaha, qaybta kaalmada adeegyada, bola hurt . So [facility] [phone].    ATENCIÓN: Si habla español, tiene a de la rosa disposición servicios gratuitos de asistencia lingüística. Llame al [phone].    We comply with applicable federal civil rights laws and [place] laws. We do not discriminate on the basis of race, color, national origin, age, disability, sex, sexual orientation, or gender identity.            Thank you!     Thank you for choosing [facility]  for your care. Our goal is always to provide you with excellent care. Hearing back from our patients is one way we can continue to improve our services. Please take a few minutes to complete the written survey that you may receive in the mail after your visit with us. Thank you!             Your Updated Medication List - Protect others around you: Learn how to safely use, store and throw away your medicines at www.disposemymeds.org.          This list is accurate as of 10/9/18 11:34 AM.  Always use your most recent med list.                   Brand Name Dispense Instructions for use Diagnosis    acetaminophen 325 MG tablet    TYLENOL    180 tablet    Take 2 tablets (650 mg) by mouth 3 times daily as needed for mild pain or fever (total acetaminophen dose should not exceed 3000 mg per day)    Neuropathic pain of " lower extremity, unspecified laterality       albuterol 108 (90 Base) MCG/ACT inhaler    PROAIR HFA/PROVENTIL HFA/VENTOLIN HFA    1 Inhaler    Inhale 2 puffs into the lungs every 6 hours as needed.    Reactive airway disease       bisacodyl 10 MG Suppository    DULCOLAX    6 suppository    Place 1 suppository (10 mg) rectally daily as needed for constipation    Constipation       blood glucose lancets standard    no brand specified    100 each    USE TO CHECK  blood sugar fasting each am  prelunch and predinner daily OR AS DIRECTED Call clinic to schedule MD APPT.    Diabetes mellitus, type 2 (H)       blood glucose monitoring test strip    no brand specified    100 strip    Use to test blood sugars 3 times daily or as directed    Diabetes mellitus, type 2 (H)       buPROPion 100 MG 12 hr tablet    WELLBUTRIN SR    180 tablet    Take 1 tablet (100 mg) by mouth 2 times daily    Tobacco abuse       capsaicin 0.025 % Crea cream    ZOSTRIX    1 Tube    Apply 1 g topically 3 times daily as needed    Dermatophytosis of nail       ciclopirox 8 % Soln     1 Bottle    Externally apply topically daily To toenails.    Dermatophytosis of nail       * clotrimazole 1 % cream    LOTRIMIN    30 g    Apply topically 2 times daily    Tinea pedis of right foot       * clotrimazole 1 % cream    LOTRIMIN    60 g    Apply topically 2 times daily as needed (skin irritation)    Furuncle of abdominal wall       colchicine 0.6 MG tablet    COLCYRS    30 tablet    Take 1-2 tablets (0.6-1.2 mg) by mouth daily as needed for moderate pain *Call for appointment [phone]. Over 12 months since last seen.    Acute gouty arthritis       diclofenac 1 % Gel topical gel    VOLTAREN    100 g    Apply 4 grams to knees or 2 grams to hands four times daily using enclosed dosing card.    Left foot pain       dulaglutide 1.5 MG/0.5ML pen    TRULICITY    45 mL    Inject 1.5 mg Subcutaneous every 7 days    Type 2 diabetes mellitus with hyperglycemia, with  long-term current use of insulin (H)       DULoxetine 20 MG EC capsule    CYMBALTA    60 capsule    Take 1 capsule (20 mg) by mouth 2 times daily    Depression       Efinaconazole 10 % Soln     8 mL    Externally apply topically daily To toenails.    Dermatophytosis of nail       gabapentin 300 MG capsule    NEURONTIN    120 capsule    Take 2 capsules (600 mg) by mouth 2 times daily Needs PCP appt for further refills    Type 2 diabetes mellitus with hyperglycemia, with long-term current use of insulin (H)       hydrALAZINE 25 MG tablet    APRESOLINE    450 tablet    Take 50-75 mg by mouth Take 2 tabs (50 mg) in am, 2 tabs (50 mg) midday, and 3 tabs (75 mg) in pm daily    Dilated cardiomyopathy (H)       * iBG star w/Device Kit     1 kit    -PLEASE GIVE PATIENT A DEVICE HER INSURANCE WILL COVER-    Type 2 diabetes mellitus with hyperglycemia, with long-term current use of insulin (H)       * blood glucose monitoring meter device kit    no brand specified    1 kit    Use to test blood sugar 3 times daily or as directed.    Diabetes mellitus, type 2 (H)       insulin glargine U-300 300 UNIT/ML injection    TOUJEO    30 mL    Inject 170 units SQ each am.    Type 2 diabetes mellitus with hyperglycemia, with long-term current use of insulin (H)       insulin pen needle 32G X 4 MM    BD RIVER U/F    200 each    Use 6 daily or as directed* Call clinic to schedule  MD APPT.    Diabetes mellitus, type 2 (H)       levothyroxine 200 MCG tablet    SYNTHROID/LEVOTHROID    102 tablet    Take 1 tab Monday thru Saturday and 2 tabs on Sundays.    Other specified hypothyroidism       metolazone 2.5 MG tablet    ZAROXOLYN    60 tablet    Take 1 tablet (2.5 mg) by mouth 2 times daily Take 1/2 hour prior to torsemide    Acute on chronic systolic heart failure (H)       metoprolol succinate 100 MG 24 hr tablet    TOPROL-XL    90 tablet    Take 1 tablet (100 mg) by mouth daily    Chronic diastolic heart failure (H)       morphine 15 MG 12  hr tablet    MS CONTIN     Take 15 mg by mouth daily as needed        Nebulizer Compressor Kit     1 kit    1 Device 4 times daily as needed.    COPD (chronic obstructive pulmonary disease) (H)       nicotine 14 MG/24HR 24 hr patch    [employer] NICOTINE    30 patch    Place 1 patch onto the skin every 24 hours    Tobacco abuse       NovoLOG FLEXPEN 100 UNIT/ML injection   Generic drug:  insulin aspart     180 mL    Inject 65 units with meals plus correction. Pt uses approx 190 units in 24 hrs.    Type 2 diabetes mellitus with hyperglycemia, with long-term current use of insulin (H)       nystatin cream    MYCOSTATIN    90 g    Apply topically 2 times daily To toenails    Dermatophytosis of nail       order for DME     1 each    Use your CPAP device as directed by your provider. Pressure change to min 13 max 18cwp        * order for DME     1 each    Equipment being ordered: Challenger Wide walker if available - patient needs seat, basket and brakes.    Dilated cardiomyopathy (H), Chronic systolic heart failure (H)       * order for DME     1 each    Equipment being ordered: [id number] $92  Plantar, fasciitis, LG, night splint    Dermatophytosis of nail, Plantar fasciitis of right foot, Diabetic neuropathy with neurologic complication (H), Peroneal tendinitis, right       * order for DME     1 Units    Left foot    Left foot pain, Diabetic neuropathy with neurologic complication (H)       oxyCODONE-acetaminophen 5-325 MG per tablet    PERCOCET    60 tablet    Take 1 tablet by mouth every 8 hours as needed for moderate to severe pain (try to limit use, no further prescriptions until seen in pain clinic)    Lumbago, Bilateral low back pain with sciatica, sciatica laterality unspecified       polyethylene glycol powder    MIRALAX/GLYCOLAX          potassium chloride SA 20 MEQ CR tablet    K-DUR/KLOR-CON M    240 tablet    TAKE TWO TABLETS BY MOUTH FOUR TIMES A DAY    Hypokalemia       senna 8.6 MG tablet    SENOKOT     45 tablet    Take 1 tablet by mouth 2 times daily    Constipation       spironolactone 50 MG tablet    ALDACTONE    30 tablet    Take 1 tablet (50 mg) by mouth daily        tiotropium 18 MCG capsule    SPIRIVA HANDIHALER    30 capsule    Inhale contents of one capsule daily.    COPD (chronic obstructive pulmonary disease) (H)       topiramate 25 MG tablet    TOPAMAX    360 tablet    25 mg at bedtime for 1 week, 50 mg at bedtime for 1 week and 75 mg daily at bedtime thereafter    Morbid obesity (H)       torsemide 100 MG tablet    DEMADEX    180 tablet    Take 1 tablet (100 mg) by mouth 2 times daily    Chronic diastolic heart failure (H)       * warfarin 5 MG tablet    COUMADIN    90 tablet    Take one to two tablets daily or as directed by the Coumadin Clinic    Atrial fibrillation, unspecified type (H), Long-term (current) use of anticoagulants       * warfarin 5 MG tablet    COUMADIN    170 tablet    [id number] on [id number], and 20mg on WF, or as directed by the [facility] at the [facility].    Atrial fibrillation, unspecified type (H)       * Notice:  This list has 9 medication(s) that are the same as other medications prescribed for you. Read the directions carefully, and ask your doctor or other care provider to review them with you.

## 2018-10-09 NOTE — NURSING NOTE
"  Chief Complaint   Patient presents with     Weight Problem     RMWM     Vitals:    10/09/18 1115   BP: 144/87   Pulse: 85   SpO2: 100%   Weight: (!) 456 lb 4.8 oz   Height: 5' 7\"     Body mass index is 71.47 kg/(m^2).  Mike Velázquez CMA    "

## 2018-10-11 ENCOUNTER — ANTICOAGULATION THERAPY VISIT (OUTPATIENT)
Dept: ANTICOAGULATION | Facility: CLINIC | Age: 43
End: 2018-10-11

## 2018-10-11 ENCOUNTER — OFFICE VISIT (OUTPATIENT)
Dept: ENDOCRINOLOGY | Facility: CLINIC | Age: 43
End: 2018-10-11
Payer: MEDICARE

## 2018-10-11 ENCOUNTER — TELEPHONE (OUTPATIENT)
Dept: ENDOCRINOLOGY | Facility: CLINIC | Age: 43
End: 2018-10-11

## 2018-10-11 VITALS
SYSTOLIC BLOOD PRESSURE: 138 MMHG | WEIGHT: 293 LBS | HEART RATE: 75 BPM | BODY MASS INDEX: 45.99 KG/M2 | HEIGHT: 67 IN | DIASTOLIC BLOOD PRESSURE: 68 MMHG

## 2018-10-11 DIAGNOSIS — Z79.01 LONG TERM CURRENT USE OF ANTICOAGULANT THERAPY: ICD-10-CM

## 2018-10-11 DIAGNOSIS — E11.42 TYPE 2 DIABETES MELLITUS WITH DIABETIC POLYNEUROPATHY, WITHOUT LONG-TERM CURRENT USE OF INSULIN (H): ICD-10-CM

## 2018-10-11 DIAGNOSIS — E66.01 MORBID OBESITY (H): ICD-10-CM

## 2018-10-11 DIAGNOSIS — I48.91 ATRIAL FIBRILLATION (H): ICD-10-CM

## 2018-10-11 DIAGNOSIS — Z79.4 TYPE 2 DIABETES MELLITUS WITH HYPERGLYCEMIA, WITH LONG-TERM CURRENT USE OF INSULIN (H): ICD-10-CM

## 2018-10-11 DIAGNOSIS — E78.5 HYPERLIPIDEMIA LDL GOAL <100: ICD-10-CM

## 2018-10-11 DIAGNOSIS — B35.1 DERMATOPHYTOSIS OF NAIL: ICD-10-CM

## 2018-10-11 DIAGNOSIS — E11.65 TYPE 2 DIABETES MELLITUS WITH HYPERGLYCEMIA, WITH LONG-TERM CURRENT USE OF INSULIN (H): ICD-10-CM

## 2018-10-11 DIAGNOSIS — I48.91 ATRIAL FIBRILLATION, UNSPECIFIED TYPE (H): ICD-10-CM

## 2018-10-11 DIAGNOSIS — J45.20 MILD INTERMITTENT ASTHMA WITHOUT COMPLICATION: Primary | ICD-10-CM

## 2018-10-11 LAB
CHOLEST SERPL-MCNC: 136 MG/DL
HBA1C MFR BLD: 8.4 % (ref 0–5.6)
HDLC SERPL-MCNC: 37 MG/DL
INR PPP: 2.18 (ref 0.86–1.14)
LDLC SERPL CALC-MCNC: 70 MG/DL
NONHDLC SERPL-MCNC: 98 MG/DL
T4 FREE SERPL-MCNC: 0.95 NG/DL (ref 0.76–1.46)
TRIGL SERPL-MCNC: 143 MG/DL
TSH SERPL DL<=0.005 MIU/L-ACNC: 2.06 MU/L (ref 0.4–4)

## 2018-10-11 RX ORDER — ALBUTEROL SULFATE 90 UG/1
2 AEROSOL, METERED RESPIRATORY (INHALATION) EVERY 6 HOURS PRN
Qty: 1 INHALER | Refills: 11 | Status: SHIPPED | OUTPATIENT
Start: 2018-10-11 | End: 2019-04-11

## 2018-10-11 RX ORDER — INSULIN ASPART 100 [IU]/ML
INJECTION, SOLUTION INTRAVENOUS; SUBCUTANEOUS
Qty: 180 ML | Refills: 3 | Status: SHIPPED | OUTPATIENT
Start: 2018-10-11 | End: 2018-10-11

## 2018-10-11 RX ORDER — CAPSAICIN 0.025 %
1 CREAM (GRAM) TOPICAL 3 TIMES DAILY PRN
Qty: 1 TUBE | Refills: 0 | Status: SHIPPED | OUTPATIENT
Start: 2018-10-11 | End: 2018-12-30

## 2018-10-11 RX ORDER — INSULIN ASPART 100 [IU]/ML
INJECTION, SOLUTION INTRAVENOUS; SUBCUTANEOUS
Qty: 180 ML | Refills: 3 | COMMUNITY
Start: 2018-10-11 | End: 2019-04-11

## 2018-10-11 NOTE — PATIENT INSTRUCTIONS
Increase Novolog 70 units with meals.  No other medication changes today.  Please send me your blood sugar data via [employer] next Tues or Wed.

## 2018-10-11 NOTE — NURSING NOTE
Chief Complaint   Patient presents with     RECHECK     Type II Diabetes      Adelaida Landrum, CMA

## 2018-10-11 NOTE — MR AVS SNAPSHOT
After Visit Summary   10/11/2018    Pricilla Brown    MRN: [id number]           Patient Information     Date Of Birth          1975        Visit Information        Provider Department      10/11/2018 11:30 AM Isela Archibald PA-C [facility]        Today's Diagnoses     Mild intermittent asthma without complication    -  1    Dermatophytosis of nail        Type 2 diabetes mellitus with hyperglycemia, with long-term current use of insulin (H)          Care Instructions    Increase Novolog 70 units with meals.  No other medication changes today.  Please send me your blood sugar data via [employer] next Tues or Wed.            Follow-ups after your visit        Your next 10 appointments already scheduled     Oct 17, 2018  8:30 AM CDT   (Arrive by 8:15 AM)   NEW NEURO with Vladislav Samuel MD   [facility] ([facility] [place])    [address] [place] [id number] [phone]            Oct 18, 2018  7:30 AM CDT   Return Visit with Norman Jean DPM   [facility] ([facility])    [address] [place] [id number] [phone]            Oct 18, 2018  2:00 PM CDT   Return Sleep Patient with YOCASTA Horan [facility] [place] ([facility])    [address] [place] [id number] [phone]            Oct 25, 2018 11:30 AM CDT   (Arrive by 11:15 AM)   RETURN HEART FAILURE with Percy Alcala MD   [facility] ([facility] [place])    [address] [place] [id number] [phone]            Oct 30, 2018 12:30 PM CDT   (Arrive by 12:15 PM)   Return Weight Management Visit with Velvet Mcallister RD   [facility] ([facility])    [address] [place] [id number]    [phone]            Nov 01, 2018 12:55 PM CDT   (Arrive by 12:40 PM)   Return Visit with Jamilah Bernal MD   [facility] ([facility])    [address] [place] [address]  [facility] [id number]   [phone]            Dec 03, 2018 12:30 PM CST   (Arrive by 12:15 PM)   Return Weight Management Visit with SHANE Murcia [facility] ([facility])    [address] [place] [phone]   [phone]            Dec 14, 2018 11:30 AM CST   (Arrive by 11:15 AM)   RETURN DIABETES with Isela Archibald PA-C   [facility] ([facility])    [address] [place] [phone]   [phone]            Feb 12, 2019 11:15 AM CST   (Arrive by 11:00 AM)   Return Weight Management Visit with Silva Damon MD   [facility] ([facility])    [address] [place] [phone]              Who to contact     Please call your clinic at [phone] to:    Ask questions about your health    Make or cancel appointments    Discuss your medicines    Learn about your test results    Speak to your doctor            Additional Information About Your Visit        [employer] Information     [employer] gives you secure access to your electronic health record. If you see a primary care provider, you can also send messages to your care team and make appointments. If you have questions, please call your primary care clinic.  If you do not have a primary care provider, please call [phone] and they will assist you.      [employer] is an electronic gateway that provides easy, online access to your medical records. With [employer], you can request a clinic appointment, read your test results, renew a prescription or communicate with your care team.     To access  "your existing account, please contact your [facility] or call [phone] for assistance.        Care EveryWhere ID     This is your Care EveryWhere ID. This could be used by other organizations to access your [facility] medical records  [id number]        Your Vitals Were     Pulse Height Last Period BMI (Body Mass Index)          75 1.702 m (5' 7\") (LMP Unknown) 73.03 kg/m2         Blood Pressure from Last 3 Encounters:   10/11/18 138/68   10/09/18 144/87   09/20/18 135/82    Weight from Last 3 Encounters:   10/11/18 (!) 211.5 kg (466 lb 4.8 oz)   10/09/18 (!) 207 kg (456 lb 4.8 oz)   09/20/18 (!) 206.8 kg (456 lb)              Today, you had the following     No orders found for display         Today's Medication Changes          These changes are accurate as of 10/11/18 11:57 AM.  If you have any questions, ask your nurse or doctor.               Start taking these medicines.        Dose/Directions    NovoLOG FLEXPEN 100 UNIT/ML injection   Used for:  Type 2 diabetes mellitus with hyperglycemia, with long-term current use of insulin (H)   Generic drug:  insulin aspart   Started by:  Isela Archibald PA-C        Inject 70 units with meals plus correction. Pt uses approx 200 units in 24 hrs.   Quantity:  180 mL   Refills:  3            Where to get your medicines      These medications were sent to [facility] [address]  [address] [place] [address] [address] [place], [place] [id number]    Hours:  TRANSPLANT PHONE NUMBER [phone] Phone:  [phone]     albuterol 108 (90 Base) MCG/ACT inhaler    capsaicin 0.025 % Crea cream                Primary Care Provider Office Phone # Fax #    Jamilah Bernal [phone]       [address] [place] [id number]        Equal Access to Services     KIERSTEN DRIVER AH: Maryam Woodward, wagermaniada luqadaha, qaybdina montalvoaldilip shah, bola ramirez " galileo zaidi'aan ah. So [facility] [phone].    ATENCIÓN: Si patti torres, tiene a de la rosa disposición servicios gratuitos de asistencia lingüística. Zara al [phone].    We comply with applicable federal civil rights laws and [place] laws. We do not discriminate on the basis of race, color, national origin, age, disability, sex, sexual orientation, or gender identity.            Thank you!     Thank you for choosing [facility] ENDOCRINOLOG[facility]  for your care. Our goal is always to provide you with excellent care. Hearing back from our patients is one way we can continue to improve our services. Please take a few minutes to complete the written survey that you may receive in the mail after your visit with us. Thank you!             Your Updated Medication List - Protect others around you: Learn how to safely use, store and throw away your medicines at www.disposemymeds.org.          This list is accurate as of 10/11/18 11:57 AM.  Always use your most recent med list.                   Brand Name Dispense Instructions for use Diagnosis    acetaminophen 325 MG tablet    TYLENOL    180 tablet    Take 2 tablets (650 mg) by mouth 3 times daily as needed for mild pain or fever (total acetaminophen dose should not exceed 3000 mg per day)    Neuropathic pain of lower extremity, unspecified laterality       albuterol 108 (90 Base) MCG/ACT inhaler    PROAIR HFA/PROVENTIL HFA/VENTOLIN HFA    1 Inhaler    Inhale 2 puffs into the lungs every 6 hours as needed    Mild intermittent asthma without complication       bisacodyl 10 MG Suppository    DULCOLAX    6 suppository    Place 1 suppository (10 mg) rectally daily as needed for constipation    Constipation       blood glucose lancets standard    no brand specified    100 each    USE TO CHECK  blood sugar fasting each am  prelunch and predinner daily OR AS DIRECTED Call clinic to schedule MD APPT.    Diabetes mellitus, type 2 (H)       blood glucose monitoring test strip    no  brand specified    100 strip    Use to test blood sugars 3 times daily or as directed    Diabetes mellitus, type 2 (H)       buPROPion 100 MG 12 hr tablet    WELLBUTRIN SR    180 tablet    Take 1 tablet (100 mg) by mouth 2 times daily    Tobacco abuse       capsaicin 0.025 % Crea cream    ZOSTRIX    1 Tube    Apply 1 g topically 3 times daily as needed    Dermatophytosis of nail       ciclopirox 8 % Soln     1 Bottle    Externally apply topically daily To toenails.    Dermatophytosis of nail       * clotrimazole 1 % cream    LOTRIMIN    30 g    Apply topically 2 times daily    Tinea pedis of right foot       * clotrimazole 1 % cream    LOTRIMIN    60 g    Apply topically 2 times daily as needed (skin irritation)    Furuncle of abdominal wall       colchicine 0.6 MG tablet    COLCYRS    30 tablet    Take 1-2 tablets (0.6-1.2 mg) by mouth daily as needed for moderate pain *Call for appointment [phone]. Over 12 months since last seen.    Acute gouty arthritis       diclofenac 1 % Gel topical gel    VOLTAREN    100 g    Apply 4 grams to knees or 2 grams to hands four times daily using enclosed dosing card.    Left foot pain       dulaglutide 1.5 MG/0.5ML pen    TRULICITY    45 mL    Inject 1.5 mg Subcutaneous every 7 days    Type 2 diabetes mellitus with hyperglycemia, with long-term current use of insulin (H)       DULoxetine 20 MG EC capsule    CYMBALTA    60 capsule    Take 1 capsule (20 mg) by mouth 2 times daily    Depression       Efinaconazole 10 % Soln     8 mL    Externally apply topically daily To toenails.    Dermatophytosis of nail       gabapentin 300 MG capsule    NEURONTIN    120 capsule    Take 2 capsules (600 mg) by mouth 2 times daily Needs PCP appt for further refills    Type 2 diabetes mellitus with hyperglycemia, with long-term current use of insulin (H)       hydrALAZINE 25 MG tablet    APRESOLINE    450 tablet    Take 50-75 mg by mouth Take 2 tabs (50 mg) in am, 2 tabs (50 mg) midday, and 3  tabs (75 mg) in pm daily    Dilated cardiomyopathy (H)       * iBG star w/Device Kit     1 kit    -PLEASE GIVE PATIENT A DEVICE HER INSURANCE WILL COVER-    Type 2 diabetes mellitus with hyperglycemia, with long-term current use of insulin (H)       * blood glucose monitoring meter device kit    no brand specified    1 kit    Use to test blood sugar 3 times daily or as directed.    Diabetes mellitus, type 2 (H)       insulin glargine U-300 300 UNIT/ML injection    TOUJEO    30 mL    Inject 170 units SQ each am.    Type 2 diabetes mellitus with hyperglycemia, with long-term current use of insulin (H)       insulin pen needle 32G X 4 MM    BD RIVER U/F    200 each    Use 6 daily or as directed* Call clinic to schedule  MD APPT.    Diabetes mellitus, type 2 (H)       levothyroxine 200 MCG tablet    SYNTHROID/LEVOTHROID    102 tablet    Take 1 tab Monday thru Saturday and 2 tabs on Sundays.    Other specified hypothyroidism       metolazone 2.5 MG tablet    ZAROXOLYN    60 tablet    Take 1 tablet (2.5 mg) by mouth 2 times daily Take 1/2 hour prior to torsemide    Acute on chronic systolic heart failure (H)       metoprolol succinate 100 MG 24 hr tablet    TOPROL-XL    90 tablet    Take 1 tablet (100 mg) by mouth daily    Chronic diastolic heart failure (H)       morphine 15 MG 12 hr tablet    MS CONTIN     Take 15 mg by mouth daily as needed        Nebulizer Compressor Kit     1 kit    1 Device 4 times daily as needed.    COPD (chronic obstructive pulmonary disease) (H)       nicotine 14 MG/24HR 24 hr patch    [employer] NICOTINE    30 patch    Place 1 patch onto the skin every 24 hours    Tobacco abuse       NovoLOG FLEXPEN 100 UNIT/ML injection   Generic drug:  insulin aspart     180 mL    Inject 70 units with meals plus correction. Pt uses approx 200 units in 24 hrs.    Type 2 diabetes mellitus with hyperglycemia, with long-term current use of insulin (H)       nystatin cream    MYCOSTATIN    90 g    Apply topically 2 times  daily To toenails    Dermatophytosis of nail       order for DME     1 each    Use your CPAP device as directed by your provider. Pressure change to min 13 max 18cwp        * order for DME     1 each    Equipment being ordered: Challenger Wide walker if available - patient needs seat, basket and brakes.    Dilated cardiomyopathy (H), Chronic systolic heart failure (H)       * order for DME     1 each    Equipment being ordered: [id number] $92  Plantar, fasciitis, LG, night splint    Dermatophytosis of nail, Plantar fasciitis of right foot, Diabetic neuropathy with neurologic complication (H), Peroneal tendinitis, right       * order for DME     1 Units    Left foot    Left foot pain, Diabetic neuropathy with neurologic complication (H)       oxyCODONE-acetaminophen 5-325 MG per tablet    PERCOCET    60 tablet    Take 1 tablet by mouth every 8 hours as needed for moderate to severe pain (try to limit use, no further prescriptions until seen in pain clinic)    Lumbago, Bilateral low back pain with sciatica, sciatica laterality unspecified       polyethylene glycol powder    MIRALAX/GLYCOLAX          potassium chloride SA 20 MEQ CR tablet    K-DUR/KLOR-CON M    240 tablet    TAKE TWO TABLETS BY MOUTH FOUR TIMES A DAY    Hypokalemia       senna 8.6 MG tablet    SENOKOT    45 tablet    Take 1 tablet by mouth 2 times daily    Constipation       spironolactone 50 MG tablet    ALDACTONE    30 tablet    Take 1 tablet (50 mg) by mouth daily        tiotropium 18 MCG capsule    SPIRIVA HANDIHALER    30 capsule    Inhale contents of one capsule daily.    COPD (chronic obstructive pulmonary disease) (H)       topiramate 50 MG tablet    TOPAMAX    270 tablet    Take 2 tablets (100 mg) by mouth daily Take 100 mg for 1 month and increase to 150 mg thereafter    Morbid obesity (H), Type 2 diabetes mellitus with diabetic polyneuropathy, without long-term current use of insulin (H)       torsemide 100 MG tablet    DEMADEX    180  tablet    Take 1 tablet (100 mg) by mouth 2 times daily    Chronic diastolic heart failure (H)       * warfarin 5 MG tablet    COUMADIN    90 tablet    Take one to two tablets daily or as directed by the Coumadin Clinic    Atrial fibrillation, unspecified type (H), Long-term (current) use of anticoagulants       * warfarin 5 MG tablet    COUMADIN    170 tablet    [id number] on [id number], and 20mg on WF, or as directed by the [facility]    Atrial fibrillation, unspecified type (H)       * Notice:  This list has 9 medication(s) that are the same as other medications prescribed for you. Read the directions carefully, and ask your doctor or other care provider to review them with you.

## 2018-10-11 NOTE — TELEPHONE ENCOUNTER
"Notes Recorded by Silva Damon MD on 10/11/2018 at 3:03 PM  Hello,  Please let her know that her lab for thyroid is good. Diabetes is improving too. Lipid is in good range. Keep up good work.    Thanks,  Silva    Spoke w/ Pt: stated \"cool\", she was very relieved that her numbers are going in the right direction.   "

## 2018-10-11 NOTE — PROGRESS NOTES
HPI  Pricilla Brown is a 43 year old female with type 2 diabetes mellitus here today for a follow up visit.  I last saw her in clinic in 2/2018.  [facility].  Pt's hx is also significant for morbid obesity, dilated cardiomyopathy with diastolic dysfunction, atrial fibrillation, hx of PE, HTN, Grave's disease, peripheral neuropathy and sleep apnea.  For her diabetes, pt was to take Trulicity 1.5 mg SQ once a week.  She is tolerating the medication well without n/v or abd pain.  She remains on Toujeo 170 units SQ each am and Novolog 65 units with meals, plus correction insulin ( 2 units/50 for BG > 150 ).  Her A1C is 8.4 % today.     Her previous A1C was 9.6 % in May 2018.  Pt has no glucose meter today.  She states her blood sugars have been in the 200 range.  On ROS today, pt reports fatigue and  intermittent blurred vision.  She has SOB and is using her CPAP. Mild cough. No fevers or chills.  She is smoking approx 10 cigs per day.  She has numbness and tingling in both feet and hands.  Pt denies nausea, vomiting, chest pain,chest pain or abd pain.  She denies diarrhea, dysuria, hematuria and no foot ulcers.    ROS  Please see under HPI.    Allergies  Allergies   Allergen Reactions     Penicillins Other (See Comments)     CHILDHOOD ALLERGY     Ibuprofen Sodium Hives and Rash       Medications  Current Outpatient Prescriptions   Medication Sig Dispense Refill     acetaminophen (TYLENOL) 325 MG tablet Take 2 tablets (650 mg) by mouth 3 times daily as needed for mild pain or fever (total acetaminophen dose should not exceed 3000 mg per day) 180 tablet 5     albuterol (PROAIR HFA/PROVENTIL HFA/VENTOLIN HFA) 108 (90 Base) MCG/ACT inhaler Inhale 2 puffs into the lungs every 6 hours as needed 1 Inhaler 11     bisacodyl (DULCOLAX) 10 MG suppository Place 1 suppository (10 mg) rectally daily as needed for constipation 6 suppository 0     blood glucose (NO BRAND SPECIFIED) lancets standard  USE TO CHECK  blood sugar fasting each am  prelunch and predinner daily OR AS DIRECTED Call clinic to schedule MD APPT. 100 each 0     blood glucose monitoring (NO BRAND SPECIFIED) meter device kit Use to test blood sugar 3 times daily or as directed. 1 kit 1     blood glucose monitoring (NO BRAND SPECIFIED) test strip Use to test blood sugars 3 times daily or as directed 100 strip 11     Blood Glucose Monitoring Suppl (IBG STAR) W/DEVICE KIT -PLEASE GIVE PATIENT A DEVICE HER INSURANCE WILL COVER- 1 kit 0     buPROPion (WELLBUTRIN SR) 100 MG 12 hr tablet Take 1 tablet (100 mg) by mouth 2 times daily 180 tablet 1     capsaicin (ZOSTRIX) 0.025 % CREA cream Apply 1 g topically 3 times daily as needed 1 Tube 0     ciclopirox 8 % SOLN Externally apply topically daily To toenails. 1 Bottle 11     clotrimazole (LOTRIMIN) 1 % cream Apply topically 2 times daily as needed (skin irritation) 60 g 1     clotrimazole (LOTRIMIN) 1 % cream Apply topically 2 times daily 30 g 1     colchicine (COLCYRS) 0.6 MG tablet Take 1-2 tablets (0.6-1.2 mg) by mouth daily as needed for moderate pain *Call for appointment [phone]. Over 12 months since last seen. 30 tablet 0     diclofenac (VOLTAREN) 1 % GEL topical gel Apply 4 grams to knees or 2 grams to hands four times daily using enclosed dosing card. 100 g 1     dulaglutide (TRULICITY) 1.5 MG/0.5ML pen Inject 1.5 mg Subcutaneous every 7 days 45 mL 3     DULoxetine (CYMBALTA) 20 MG capsule Take 1 capsule (20 mg) by mouth 2 times daily 60 capsule 0     Efinaconazole 10 % SOLN Externally apply topically daily To toenails. 8 mL 11     gabapentin (NEURONTIN) 300 MG capsule Take 2 capsules (600 mg) by mouth 2 times daily Needs PCP appt for further refills 120 capsule 0     hydrALAZINE (APRESOLINE) 25 MG tablet Take 50-75 mg by mouth Take 2 tabs (50 mg) in am, 2 tabs (50 mg) midday, and 3 tabs (75 mg) in pm daily  450 tablet 3     insulin glargine U-300 (TOUJEO) 300 UNIT/ML injection Inject  170 units SQ each am. 30 mL 3     insulin pen needle (BD RIVER U/F) 32G X 4 MM Use 6 daily or as directed* Call clinic to schedule  MD APPT. 200 each 0     levothyroxine (SYNTHROID/LEVOTHROID) 200 MCG tablet Take 1 tab Monday thru Saturday and 2 tabs on Sundays. 102 tablet 3     metolazone (ZAROXOLYN) 2.5 MG tablet Take 1 tablet (2.5 mg) by mouth 2 times daily Take 1/2 hour prior to torsemide 60 tablet 1     metoprolol succinate (TOPROL-XL) 100 MG 24 hr tablet Take 1 tablet (100 mg) by mouth daily 90 tablet 3     morphine (MS CONTIN) 15 MG 12 hr tablet Take 15 mg by mouth daily as needed       NOVOLOG FLEXPEN 100 UNIT/ML soln Inject 70 units with meals plus correction. Pt uses approx 200 units in 24 hrs. 180 mL 3     nystatin (MYCOSTATIN) cream Apply topically 2 times daily To toenails 90 g 6     order for DME Left foot 1 Units 0     order for DME Equipment being ordered: [id number] $92   Plantar, fasciitis, LG, night splint 1 each 0     order for DME Equipment being ordered: Challenger Wide walker if available - patient needs seat, basket and brakes. 1 each 0     ORDER FOR DME Use your CPAP device as directed by your provider. Pressure change to min 13 max [id number] 1 each 99     oxyCODONE-acetaminophen (PERCOCET) 5-325 MG per tablet Take 1 tablet by mouth every 8 hours as needed for moderate to severe pain (try to limit use, no further prescriptions until seen in pain clinic) 60 tablet 0     polyethylene glycol (MIRALAX/GLYCOLAX) powder        potassium chloride SA (K-DUR/KLOR-CON M) 20 MEQ CR tablet TAKE TWO TABLETS BY MOUTH FOUR TIMES A  tablet 11     Respiratory Therapy Supplies (NEBULIZER COMPRESSOR) KIT 1 Device 4 times daily as needed. 1 kit 3     senna (SENOKOT) 8.6 MG tablet Take 1 tablet by mouth 2 times daily 45 tablet 0     spironolactone (ALDACTONE) 50 MG tablet Take 1 tablet (50 mg) by mouth daily 30 tablet 11     topiramate (TOPAMAX) 50 MG tablet Take 2 tablets (100 mg) by mouth daily Take  100 mg for 1 month and increase to 150 mg thereafter 270 tablet 3     torsemide (DEMADEX) 100 MG tablet Take 1 tablet (100 mg) by mouth 2 times daily 180 tablet 1     warfarin (COUMADIN) 5 MG tablet Alip11ur on [id number], and 20mg on WF, or as directed by the Medication Monitoring Clinic at the [facility]. 170 tablet 1     warfarin (COUMADIN) 5 MG tablet Take one to two tablets daily or as directed by the Coumadin Clinic 90 tablet 3     nicotine ([employer] NICOTINE) 14 MG/24HR patch 2h hr Place 1 patch onto the skin every 24 hours (Patient not taking: Reported on 7/25/2018) 30 patch 1     tiotropium (SPIRIVA HANDIHALER) 18 MCG inhalation capsule Inhale contents of one capsule daily. (Patient not taking: Reported on 7/25/2018) 30 capsule 1     [DISCONTINUED] albuterol (PROAIR HFA, PROVENTIL HFA, VENTOLIN HFA) 108 (90 BASE) MCG/ACT inhaler Inhale 2 puffs into the lungs every 6 hours as needed. 1 Inhaler 11     [DISCONTINUED] NOVOLOG FLEXPEN 100 UNIT/ML soln Inject 70units with meals plus correction. Pt uses approx 200 units in 24 hrs. 180 mL 3     [DISCONTINUED] NOVOLOG FLEXPEN 100 UNIT/ML soln Inject 65 units with meals plus correction. Pt uses approx 190 units in 24 hrs. 180 mL 3       Family History  family history includes Cerebrovascular Disease in her mother; Diabetes in her brother, mother, and sister; HEART DISEASE in her brother, father, mother, and sister; Hypertension in her mother; Psychotic Disorder in her brother; Thyroid Disease in her brother, maternal aunt, maternal uncle, mother, sister, sister, and sister. There is no history of Cancer, Glaucoma, or Macular Degeneration.    Social History  Smoke: yes- 10 cigs/day.  ETOH: rare.  Lives with significant other.  No children.      Past Medical History  Past Medical History:   Diagnosis Date     A-fib (H) 2011    on coumadin since 1/13     Asthma     as a kid     Chest pain 2/1/2017     Chronic anticoagulation for a-fib 2/15/2013    INR's followed by coumadin  [facility]     Diabetes mellitus (H) 2012     Diastolic heart failure 2/15/2013     Dilated cardiomyopathy (H) 1/8/2013     HTN (hypertension)      Hyperthyroidism     Graves, s/p I131 1/13, now on prednisone and methimazole     Morbid obesity (H)      Pulmonary embolism (H) 1/12    hospitalized in [place], on lovenox/coumadin for a few months but stopped, hypercoag w/u neg per pt     Sleep apnea     using CPAP         Physical Exam  /66  Pulse 75  Ht 1.702 m (5' 7\")  Wt (!) 211.5 kg (466 lb 4.8 oz)  LMP  (LMP Unknown)  BMI 73.03 kg/m2  Body mass index is 73.03 kg/(m^2).      RESULTS  Creatinine   Date Value Ref Range Status   09/11/2018 1.37 (H) 0.52 - 1.04 mg/dL Final     GFR Estimate   Date Value Ref Range Status   09/11/2018 42 (L) >60 mL/min/1.7m2 Final     Comment:     Non  GFR Calc     Hemoglobin A1C   Date Value Ref Range Status   05/29/2018 9.6 (H) 0 - 5.6 % Final     Comment:     Normal <5.7% Prediabetes 5.7-6.4%  Diabetes 6.5% or higher - adopted from ADA   consensus guidelines.       Potassium   Date Value Ref Range Status   09/11/2018 3.4 3.4 - 5.3 mmol/L Final     ALT   Date Value Ref Range Status   05/29/2018 20 0 - 50 U/L Final     AST   Date Value Ref Range Status   05/29/2018 12 0 - 45 U/L Final     TSH   Date Value Ref Range Status   02/07/2018 7.15 (H) 0.40 - 4.00 mU/L Final     T4 Free   Date Value Ref Range Status   02/07/2018 0.91 0.76 - 1.46 ng/dL Final       Cholesterol   Date Value Ref Range Status   10/26/2016 135 <200 mg/dL Final   07/14/2015 164 <200 mg/dL Final     Comment:     LDL Cholesterol is the primary guide to therapy.   The NCEP recommends further evaluation of: patients with cholesterol greater   than 200 mg/dL if additional risk factors are present, cholesterol greater   than   240 mg/dL, triglycerides greater than 150 mg/dL, or HDL less than 40 mg/dL.       HDL Cholesterol   Date Value Ref Range Status   10/26/2016 37 (L) >49 mg/dL Final   07/14/2015 " 49 (L) >50 mg/dL Final     LDL Cholesterol Calculated   Date Value Ref Range Status   10/26/2016 68 <100 mg/dL Final     Comment:     Desirable:       <100 mg/dl   07/14/2015 90 0 - 129 mg/dL Final     Comment:     LDL Cholesterol is the primary guide to therapy: LDL-cholesterol goal in high   risk patients is <100 mg/dL and in very high risk patients is <70 mg/dL.       Triglycerides   Date Value Ref Range Status   10/26/2016 151 (H) <150 mg/dL Final     Comment:     Borderline high:  150-199 mg/dl   High:             200-499 mg/dl   Very high:       >499 mg/dl   Non Fasting     07/14/2015 123 0 - 150 mg/dL Final     Cholesterol/HDL Ratio   Date Value Ref Range Status   07/14/2015 3.4 0.0 - 5.0 Final   01/06/2013 3.6 0.0 - 5.0 Final         A1C  8.4    10/11/2018  A1C  8.9    2/7/2018  A1C  8.8     2/7/2017  A1C  9.0    11/10/2016  A1C 10.7   6/16/2016  A1C 12.1   3/10/2016    ASSESSMENT/PLAN:    1.  TYPE 2 DIABETES MELLITUS: Uncontrolled type 2 diabetes mellitus.  Pricilla is to remain on  Trulicity 1.5 mg SQ once a week,  Toujeo 170 units SQ each am and increase Novolog 70 units with meals with correction insulin ( 2 unit/50 for BG > 150 ).  She does not tolerate Metformin due to GI distress.  She is to send me her blood sugar data next Tues or Wed via [employer] for review and additional insulin adjustments will be made if needed.  She is to check her blood sugar fasting each am and check her blood sugar prelunch and predinner daily.  She was seen by [facility] in June 2018 without retinopathy.   Pt's LDL is 70 today.   Flu vaccine given today.    2.  HTN: Continue current RXs. /68 today.    3.  CARDIOMYOPATHY/A FIB:  Pt followed here by Cardiology staff.      4. GRAVE'S DISEASE:  Hx of Grave's disease s/p I 131 in Jan 2013.    Pt became hypothyroid following above treatment and is taking  Levothyroxine 200 mcg 1 pill Monday - Saturday and 2 pills on Sundays.  Her TSH is normal today.    5.  OBESITY:  Morbid obesity  with BMI > 70 kg/m2.  She has been seen by the [facility] staff.    6.  Return to Endocrine Clinic in 2 months.

## 2018-10-11 NOTE — LETTER
10/11/2018       RE: Pricilla Brown  [address]  [facility] [address]     Dear Colleague,    Thank you for referring your patient, Pricilla Brown, to the [facility] ENDOCRINOLOG[facility] at [facility]. Please see a copy of my visit note below.    HPI  Pricilla Brown is a 43 year old female with type 2 diabetes mellitus here today for a follow up visit.  I last saw her in clinic in 2/2018.  She was recently seen in the [facility].  Pt's hx is also significant for morbid obesity, dilated cardiomyopathy with diastolic dysfunction, atrial fibrillation, hx of PE, HTN, Grave's disease, peripheral neuropathy and sleep apnea.  For her diabetes, pt was to take Trulicity 1.5 mg SQ once a week.  She is tolerating the medication well without n/v or abd pain.  She remains on Toujeo 170 units SQ each am and Novolog 65 units with meals, plus correction insulin ( 2 units/50 for BG > 150 ).  Her A1C is 8.4 % today.     Her previous A1C was 9.6 % in May 2018.  Pt has no glucose meter today.  She states her blood sugars have been in the 200 range.  On ROS today, pt reports fatigue and  intermittent blurred vision.  She has SOB and is using her CPAP. Mild cough. No fevers or chills.  She is smoking approx 10 cigs per day.  She has numbness and tingling in both feet and hands.  Pt denies nausea, vomiting, chest pain,chest pain or abd pain.  She denies diarrhea, dysuria, hematuria and no foot ulcers.    ROS  Please see under HPI.    Allergies  Allergies   Allergen Reactions     Penicillins Other (See Comments)     CHILDHOOD ALLERGY     Ibuprofen Sodium Hives and Rash       Medications  Current Outpatient Prescriptions   Medication Sig Dispense Refill     acetaminophen (TYLENOL) 325 MG tablet Take 2 tablets (650 mg) by mouth 3 times daily as needed for mild pain or fever (total acetaminophen dose should not exceed 3000 mg per day) 180 tablet 5     albuterol (PROAIR HFA/PROVENTIL HFA/VENTOLIN HFA)  108 (90 Base) MCG/ACT inhaler Inhale 2 puffs into the lungs every 6 hours as needed 1 Inhaler 11     bisacodyl (DULCOLAX) 10 MG suppository Place 1 suppository (10 mg) rectally daily as needed for constipation 6 suppository 0     blood glucose (NO BRAND SPECIFIED) lancets standard USE TO CHECK  blood sugar fasting each am  prelunch and predinner daily OR AS DIRECTED Call clinic to schedule MD APPT. 100 each 0     blood glucose monitoring (NO BRAND SPECIFIED) meter device kit Use to test blood sugar 3 times daily or as directed. 1 kit 1     blood glucose monitoring (NO BRAND SPECIFIED) test strip Use to test blood sugars 3 times daily or as directed 100 strip 11     Blood Glucose Monitoring Suppl (IBG STAR) W/DEVICE KIT -PLEASE GIVE PATIENT A DEVICE HER INSURANCE WILL COVER- 1 kit 0     buPROPion (WELLBUTRIN SR) 100 MG 12 hr tablet Take 1 tablet (100 mg) by mouth 2 times daily 180 tablet 1     capsaicin (ZOSTRIX) 0.025 % CREA cream Apply 1 g topically 3 times daily as needed 1 Tube 0     ciclopirox 8 % SOLN Externally apply topically daily To toenails. 1 Bottle 11     clotrimazole (LOTRIMIN) 1 % cream Apply topically 2 times daily as needed (skin irritation) 60 g 1     clotrimazole (LOTRIMIN) 1 % cream Apply topically 2 times daily 30 g 1     colchicine (COLCYRS) 0.6 MG tablet Take 1-2 tablets (0.6-1.2 mg) by mouth daily as needed for moderate pain *Call for appointment [phone]. Over 12 months since last seen. 30 tablet 0     diclofenac (VOLTAREN) 1 % GEL topical gel Apply 4 grams to knees or 2 grams to hands four times daily using enclosed dosing card. 100 g 1     dulaglutide (TRULICITY) 1.5 MG/0.5ML pen Inject 1.5 mg Subcutaneous every 7 days 45 mL 3     DULoxetine (CYMBALTA) 20 MG capsule Take 1 capsule (20 mg) by mouth 2 times daily 60 capsule 0     Efinaconazole 10 % SOLN Externally apply topically daily To toenails. 8 mL 11     gabapentin (NEURONTIN) 300 MG capsule Take 2 capsules (600 mg) by mouth 2 times  daily Needs PCP appt for further refills 120 capsule 0     hydrALAZINE (APRESOLINE) 25 MG tablet Take 50-75 mg by mouth Take 2 tabs (50 mg) in am, 2 tabs (50 mg) midday, and 3 tabs (75 mg) in pm daily  450 tablet 3     insulin glargine U-300 (TOUJEO) 300 UNIT/ML injection Inject 170 units SQ each am. 30 mL 3     insulin pen needle (BD RIVER U/F) 32G X 4 MM Use 6 daily or as directed* Call clinic to schedule  MD APPT. 200 each 0     levothyroxine (SYNTHROID/LEVOTHROID) 200 MCG tablet Take 1 tab Monday thru Saturday and 2 tabs on Sundays. 102 tablet 3     metolazone (ZAROXOLYN) 2.5 MG tablet Take 1 tablet (2.5 mg) by mouth 2 times daily Take 1/2 hour prior to torsemide 60 tablet 1     metoprolol succinate (TOPROL-XL) 100 MG 24 hr tablet Take 1 tablet (100 mg) by mouth daily 90 tablet 3     morphine (MS CONTIN) 15 MG 12 hr tablet Take 15 mg by mouth daily as needed       NOVOLOG FLEXPEN 100 UNIT/ML soln Inject 70 units with meals plus correction. Pt uses approx 200 units in 24 hrs. 180 mL 3     nystatin (MYCOSTATIN) cream Apply topically 2 times daily To toenails 90 g 6     order for DME Left foot 1 Units 0     order for DME Equipment being ordered:  [id number] $92   Plantar, fasciitis, LG, night splint 1 each 0     order for DME Equipment being ordered: Challenger Herminia walker if available - patient needs seat, basket and brakes. 1 each 0     ORDER FOR DME Use your CPAP device as directed by your provider. Pressure change to min 13 max [id number] 1 each 99     oxyCODONE-acetaminophen (PERCOCET) 5-325 MG per tablet Take 1 tablet by mouth every 8 hours as needed for moderate to severe pain (try to limit use, no further prescriptions until seen in pain clinic) 60 tablet 0     polyethylene glycol (MIRALAX/GLYCOLAX) powder        potassium chloride SA (K-DUR/KLOR-CON M) 20 MEQ CR tablet TAKE TWO TABLETS BY MOUTH FOUR TIMES A  tablet 11     Respiratory Therapy Supplies (NEBULIZER COMPRESSOR) KIT 1 Device 4 times daily  as needed. 1 kit 3     senna (SENOKOT) 8.6 MG tablet Take 1 tablet by mouth 2 times daily 45 tablet 0     spironolactone (ALDACTONE) 50 MG tablet Take 1 tablet (50 mg) by mouth daily 30 tablet 11     topiramate (TOPAMAX) 50 MG tablet Take 2 tablets (100 mg) by mouth daily Take 100 mg for 1 month and increase to 150 mg thereafter 270 tablet 3     torsemide (DEMADEX) 100 MG tablet Take 1 tablet (100 mg) by mouth 2 times daily 180 tablet 1     warfarin (COUMADIN) 5 MG tablet [id number] on [id number], and 20mg on WF, or as directed by the Medication Monitoring Clinic at the [facility]. 170 tablet 1     warfarin (COUMADIN) 5 MG tablet Take one to two tablets daily or as directed by the Coumadin Clinic 90 tablet 3     nicotine ([employer] NICOTINE) 14 MG/24HR patch 2h hr Place 1 patch onto the skin every 24 hours (Patient not taking: Reported on 7/25/2018) 30 patch 1     tiotropium (SPIRIVA HANDIHALER) 18 MCG inhalation capsule Inhale contents of one capsule daily. (Patient not taking: Reported on 7/25/2018) 30 capsule 1     [DISCONTINUED] albuterol (PROAIR HFA, PROVENTIL HFA, VENTOLIN HFA) 108 (90 BASE) MCG/ACT inhaler Inhale 2 puffs into the lungs every 6 hours as needed. 1 Inhaler 11     [DISCONTINUED] NOVOLOG FLEXPEN 100 UNIT/ML soln Inject 70units with meals plus correction. Pt uses approx 200 units in 24 hrs. 180 mL 3     [DISCONTINUED] NOVOLOG FLEXPEN 100 UNIT/ML soln Inject 65 units with meals plus correction. Pt uses approx 190 units in 24 hrs. 180 mL 3       Family History  family history includes Cerebrovascular Disease in her mother; Diabetes in her brother, mother, and sister; HEART DISEASE in her brother, father, mother, and sister; Hypertension in her mother; Psychotic Disorder in her brother; Thyroid Disease in her brother, maternal aunt, maternal uncle, mother, sister, sister, and sister. There is no history of Cancer, Glaucoma, or Macular Degeneration.    Social History  Smoke: yes- 10 cigs/day.  ETOH: rare.  Lives  "with significant other.  No children.      Past Medical History  Past Medical History:   Diagnosis Date     A-fib (H) 2011    on coumadin since 1/13     Asthma     as a kid     Chest pain 2/1/2017     Chronic anticoagulation for a-fib 2/15/2013    INR's followed by coumadin clinic at      Diabetes mellitus (H) 2012     Diastolic heart failure 2/15/2013     Dilated cardiomyopathy (H) 1/8/2013     HTN (hypertension)      Hyperthyroidism     Graves, s/p I131 1/13, now on prednisone and methimazole     Morbid obesity (H)      Pulmonary embolism (H) 1/12    hospitalized in [place], on lovenox/coumadin for a few months but stopped, hypercoag w/u neg per pt     Sleep apnea     using CPAP         Physical Exam  /66  Pulse 75  Ht 1.702 m (5' 7\")  Wt (!) 211.5 kg (466 lb 4.8 oz)  LMP  (LMP Unknown)  BMI 73.03 kg/m2  Body mass index is 73.03 kg/(m^2).      RESULTS  Creatinine   Date Value Ref Range Status   09/11/2018 1.37 (H) 0.52 - 1.04 mg/dL Final     GFR Estimate   Date Value Ref Range Status   09/11/2018 42 (L) >60 mL/min/1.7m2 Final     Comment:     Non  GFR Calc     Hemoglobin A1C   Date Value Ref Range Status   05/29/2018 9.6 (H) 0 - 5.6 % Final     Comment:     Normal <5.7% Prediabetes 5.7-6.4%  Diabetes 6.5% or higher - adopted from ADA   consensus guidelines.       Potassium   Date Value Ref Range Status   09/11/2018 3.4 3.4 - 5.3 mmol/L Final     ALT   Date Value Ref Range Status   05/29/2018 20 0 - 50 U/L Final     AST   Date Value Ref Range Status   05/29/2018 12 0 - 45 U/L Final     TSH   Date Value Ref Range Status   02/07/2018 7.15 (H) 0.40 - 4.00 mU/L Final     T4 Free   Date Value Ref Range Status   02/07/2018 0.91 0.76 - 1.46 ng/dL Final       Cholesterol   Date Value Ref Range Status   10/26/2016 135 <200 mg/dL Final   07/14/2015 164 <200 mg/dL Final     Comment:     LDL Cholesterol is the primary guide to therapy.   The NCEP recommends further evaluation of: patients with " cholesterol greater   than 200 mg/dL if additional risk factors are present, cholesterol greater   than   240 mg/dL, triglycerides greater than 150 mg/dL, or HDL less than 40 mg/dL.       HDL Cholesterol   Date Value Ref Range Status   10/26/2016 37 (L) >49 mg/dL Final   07/14/2015 49 (L) >50 mg/dL Final     LDL Cholesterol Calculated   Date Value Ref Range Status   10/26/2016 68 <100 mg/dL Final     Comment:     Desirable:       <100 mg/dl   07/14/2015 90 0 - 129 mg/dL Final     Comment:     LDL Cholesterol is the primary guide to therapy: LDL-cholesterol goal in high   risk patients is <100 mg/dL and in very high risk patients is <70 mg/dL.       Triglycerides   Date Value Ref Range Status   10/26/2016 151 (H) <150 mg/dL Final     Comment:     Borderline high:  150-199 mg/dl   High:             200-499 mg/dl   Very high:       >499 mg/dl   Non Fasting     07/14/2015 123 0 - 150 mg/dL Final     Cholesterol/HDL Ratio   Date Value Ref Range Status   07/14/2015 3.4 0.0 - 5.0 Final   01/06/2013 3.6 0.0 - 5.0 Final         A1C  8.4    10/11/2018  A1C  8.9    2/7/2018  A1C  8.8     2/7/2017  A1C  9.0    11/10/2016  A1C 10.7   6/16/2016  A1C 12.1   3/10/2016    ASSESSMENT/PLAN:    1.  TYPE 2 DIABETES MELLITUS: Uncontrolled type 2 diabetes mellitus.  Pricilla is to remain on  Trulicity 1.5 mg SQ once a week,  Toujeo 170 units SQ each am and increase Novolog 70 units with meals with correction insulin ( 2 unit/50 for BG > 150 ).  She does not tolerate Metformin due to GI distress.  She is to send me her blood sugar data next Tues or Wed via [employer] for review and additional insulin adjustments will be made if needed.  She is to check her blood sugar fasting each am and check her blood sugar prelunch and predinner daily.  She was seen by [facility] in June 2018 without retinopathy.   Pt's LDL is 70 today.   Flu vaccine given today.    2.  HTN: Continue current RXs. /68 today.    3.  CARDIOMYOPATHY/A FIB:  Pt followed here by  Cardiology staff.      4. GRAVE'S DISEASE:  Hx of Grave's disease s/p I 131 in Jan 2013.    Pt became hypothyroid following above treatment and is taking  Levothyroxine 200 mcg 1 pill Monday - Saturday and 2 pills on Sundays.  Her TSH is normal today.    5.  OBESITY:  Morbid obesity with BMI > 70 kg/m2.  She has been seen by the [facility] staff.    6.  Return to Endocrine Clinic in 2 months.      Isela Archibald PA-C

## 2018-10-11 NOTE — PROGRESS NOTES
10/11/18  Patient was seen in the clinic today.  Instructed to take an increased dose of Trulicity.  Spoke to Pricilla.  She has an appointment for labs next week.  Updated calendar.        ANTICOAGULATION FOLLOW-UP CLINIC VISIT    Patient Name:  Pricilla Brown  Date:  10/11/2018  Contact Type:  Telephone    SUBJECTIVE:     Patient Findings     Positives No Problem Findings    Comments Spoke to Pricilla.  Patient was seen in the clinic today.  See note.           OBJECTIVE    INR   Date Value Ref Range Status   10/11/2018 2.18 (H) 0.86 - 1.14 Final     Chromogenic Factor 10   Date Value Ref Range Status   04/08/2017 19 (L) 70 - 130 % Final     Comment:     Therapeutic Range:  A Chromogenic Factor 10 level of approximately 20-40%   inversely correlates with an INR of 2-3 for patients receiving Warfarin.   Chromogenic Factor 10 levels below 20% indicate an INR greater than 3 and   levels above 40% indicate an INR less than 2.         ASSESSMENT / PLAN  INR assessment THER    Recheck INR In: 1 WEEK    INR Location Clinic      Anticoagulation Summary as of 10/11/2018     INR goal 2.0-2.5   Today's INR 2.18   Warfarin maintenance plan 20 mg (5 mg x 4) on Wed, Fri; 15 mg (5 mg x 3) all other days   Full warfarin instructions 20 mg on Wed, Fri; 15 mg all other days   Weekly warfarin total 115 mg   Plan last modified Demi Kebede RN (8/15/2018)   Next INR check 10/18/2018   Priority INR   Target end date Indefinite    Indications   Atrial fibrillation (H) [I48.91] [I48.91]  Long-term (current) use of anticoagulants [Z79.01] [Z79.01]         Anticoagulation Episode Summary     INR check location Clinic Lab    Preferred lab     Send INR reminders to [facility]    Comments Contact Patient at [phone]      Anticoagulation Care Providers     Provider Role Specialty Phone number    Percy Alcala MD  Cardiology [phone]            See the Encounter Report to view Anticoagulation Flowsheet and Dosing Calendar  (Go to Encounters tab in chart review, and find the Anticoagulation Therapy Visit)    Spoke with patient. Gave them their lab results and new warfarin recommendation.  No changes in health, medication, or diet. No missed doses, no falls. No signs or symptoms of bleed or clotting.    Luis Daniel Burgos, RN

## 2018-10-11 NOTE — MR AVS SNAPSHOT
Pricilla Brown   10/11/2018   Anticoagulation Therapy Visit    Description:  43 year old female   Provider:  Luis Daniel Burgos, RN   Department:  [facility]           INR as of 10/11/2018     Today's INR 2.18      Anticoagulation Summary as of 10/11/2018     INR goal 2.0-2.5   Today's INR 2.18   Full warfarin instructions 20 mg on Wed, Fri; 15 mg all other days   Next INR check 10/18/2018    Indications   Atrial fibrillation (H) [I48.91] [I48.91]  Long-term (current) use of anticoagulants [Z79.01] [Z79.01]         October 2018 Details    Sun Mon Tue Wed Thu Fri Sat      1               2               3               4               5               6                 7               8               9               10               11      15 mg   See details      12      20 mg         13      15 mg           14      15 mg         15      15 mg         16      15 mg         17      20 mg         18            19               20                 21               22               23               24               25               26               27                 28               29               30               31                   Date Details   10/11 This INR check       Date of next INR:  10/18/2018         How to take your warfarin dose     To take:  15 mg Take 3 of the 5 mg tablets.    To take:  20 mg Take 4 of the 5 mg tablets.

## 2018-10-12 DIAGNOSIS — E11.9 DIABETES MELLITUS, TYPE 2 (H): ICD-10-CM

## 2018-10-24 ENCOUNTER — OFFICE VISIT (OUTPATIENT)
Dept: OPHTHALMOLOGY | Facility: CLINIC | Age: 43
End: 2018-10-24
Payer: MEDICARE

## 2018-10-24 ENCOUNTER — ANTICOAGULATION THERAPY VISIT (OUTPATIENT)
Dept: ANTICOAGULATION | Facility: CLINIC | Age: 43
End: 2018-10-24

## 2018-10-24 DIAGNOSIS — H53.10 SUBJECTIVE VISUAL DISTURBANCE: Primary | ICD-10-CM

## 2018-10-24 DIAGNOSIS — H47.10 EDEMA OF OPTIC NERVE: ICD-10-CM

## 2018-10-24 DIAGNOSIS — I48.91 ATRIAL FIBRILLATION (H): ICD-10-CM

## 2018-10-24 DIAGNOSIS — H53.10 SUBJECTIVE VISUAL DISTURBANCE: ICD-10-CM

## 2018-10-24 DIAGNOSIS — I50.22 CHRONIC SYSTOLIC HEART FAILURE (H): Primary | ICD-10-CM

## 2018-10-24 DIAGNOSIS — Z79.01 LONG TERM CURRENT USE OF ANTICOAGULANT THERAPY: ICD-10-CM

## 2018-10-24 DIAGNOSIS — H35.033 HYPERTENSIVE RETINOPATHY OF BOTH EYES: Primary | ICD-10-CM

## 2018-10-24 DIAGNOSIS — I48.91 ATRIAL FIBRILLATION, UNSPECIFIED TYPE (H): ICD-10-CM

## 2018-10-24 LAB — INR PPP: 2.99 (ref 0.86–1.14)

## 2018-10-24 ASSESSMENT — VISUAL ACUITY
METHOD: SNELLEN - LINEAR
OS_CC: 20/30
CORRECTION_TYPE: GLASSES
OS_CC+: +2
OD_CC: 20/25

## 2018-10-24 ASSESSMENT — REFRACTION_WEARINGRX
OS_SPHERE: -0.25
OS_ADD: +1.25
OS_CYLINDER: +0.50
OD_SPHERE: -0.75
OD_CYLINDER: +0.75
OS_AXIS: 110
OD_ADD: +1.25
OD_AXIS: 110

## 2018-10-24 ASSESSMENT — CONF VISUAL FIELD
OD_NORMAL: 1
METHOD: COUNTING FINGERS
OS_NORMAL: 1

## 2018-10-24 ASSESSMENT — TONOMETRY
OS_IOP_MMHG: 19
IOP_METHOD: ICARE
OD_IOP_MMHG: 18

## 2018-10-24 ASSESSMENT — EXTERNAL EXAM - RIGHT EYE: OD_EXAM: NORMAL

## 2018-10-24 ASSESSMENT — SLIT LAMP EXAM - LIDS
COMMENTS: NORMAL
COMMENTS: NORMAL

## 2018-10-24 ASSESSMENT — EXTERNAL EXAM - LEFT EYE: OS_EXAM: NORMAL

## 2018-10-24 ASSESSMENT — CUP TO DISC RATIO
OD_RATIO: 0.3
OS_RATIO: 0.4

## 2018-10-24 NOTE — MR AVS SNAPSHOT
Pricilla Brown   10/24/2018   Anticoagulation Therapy Visit    Description:  43 year old female   Provider:  Alecia Jacobson, RN   Department:  [facility]           INR as of 10/24/2018     Today's INR 2.99!      Anticoagulation Summary as of 10/24/2018     INR goal 2.0-2.5   Today's INR 2.99!   Full warfarin instructions 20 mg on Fri; 15 mg all other days   Next INR check 10/31/2018    Indications   Atrial fibrillation (H) [I48.91] [I48.91]  Long-term (current) use of anticoagulants [Z79.01] [Z79.01]         October 2018 Details    Sun Mon Tue Wed Thu Fri Sat      1               2               3               4               5               6                 7               8               9               10               11               12               13                 14               15               16               17               18               19               20                 21               22               23               24      15 mg   See details      25      15 mg         26      20 mg         27      15 mg           28      15 mg         29      15 mg         30      15 mg         31                Date Details   10/24 This INR check       Date of next INR:  10/31/2018         How to take your warfarin dose     To take:  15 mg Take 3 of the 5 mg tablets.    To take:  20 mg Take 4 of the 5 mg tablets.

## 2018-10-24 NOTE — PROGRESS NOTES
ANTICOAGULATION FOLLOW-UP CLINIC VISIT    Patient Name:  Pricilla Brown  Date:  10/24/2018  Contact Type:  Telephone    SUBJECTIVE:     Patient Findings     Comments INR trending up.  Will cut dosage.            OBJECTIVE    INR   Date Value Ref Range Status   10/24/2018 2.99 (H) 0.86 - 1.14 Final     Chromogenic Factor 10   Date Value Ref Range Status   04/08/2017 19 (L) 70 - 130 % Final     Comment:     Therapeutic Range:  A Chromogenic Factor 10 level of approximately 20-40%   inversely correlates with an INR of 2-3 for patients receiving Warfarin.   Chromogenic Factor 10 levels below 20% indicate an INR greater than 3 and   levels above 40% indicate an INR less than 2.         ASSESSMENT / PLAN  No question data found.  Anticoagulation Summary as of 10/24/2018     INR goal 2.0-2.5   Today's INR 2.99!   Warfarin maintenance plan 20 mg (5 mg x 4) on Fri; 15 mg (5 mg x 3) all other days   Full warfarin instructions 20 mg on Fri; 15 mg all other days   Weekly warfarin total 110 mg   Plan last modified Alecia Jacobson RN (10/24/2018)   Next INR check 10/31/2018   Priority INR   Target end date Indefinite    Indications   Atrial fibrillation (H) [I48.91] [I48.91]  Long-term (current) use of anticoagulants [Z79.01] [Z79.01]         Anticoagulation Episode Summary     INR check location Clinic Lab    Preferred lab     Send INR reminders to [facility]    Comments Contact Patient at [phone]      Anticoagulation Care Providers     Provider Role Specialty Phone number    Percy Alcala MD  Cardiology [phone]            See the Encounter Report to view Anticoagulation Flowsheet and Dosing Calendar (Go to Encounters tab in chart review, and find the Anticoagulation Therapy Visit)    My chart message sent.  Unable to get through on home #.     Alecia Jacobson RN             10/25/18 ADDENDUM  Patient phoned with updated number.  Reviewed contact information with patient.  Updated comment  section.  Will check on Tuesday at her next appointment.    Luis Daniel Burgos RN

## 2018-10-24 NOTE — PROGRESS NOTES
Assessment & Plan     Pricilla Brown is a 43 year old female with the following diagnoses:   1. Hypertensive retinopathy of both eyes    2. Subjective visual disturbance       Ms. Brown is a 44 y/o F here for evaluation of hypertensive retinopathy with indistinct ONH margins in both eyes. She was referred by Dr. Russel Vergara O.D.    Ms Brown has had DM II  (last A1c 8.4) and hypertension (120-140's) systolic in past office visits). Patient endorses times where blood pressure has been near 200. Patient has lost 10-15 lbs in the past month. She wishes to have bariatric surgery, trying to lose 30 lbs to qualify.     Patient notes that vision has had ups and downs, she has correlated with her blood sugars. Notes glare with driving at night.     PMhx of obesity, DM II, Diastolic cardiomyopathy, a fib, h/o PE, HTN hypothyroidism.  She takes metoprolol, hydralazine, warfarin, insulin, topiramate for weight.    Visual acuity today 20/25 and 20/30 +2. Pupils brisk with no rAPD. Pressures 18/19. Color vision and confrontational fields normal. SLE 1+ NS DFE no blurring of margins, healthy rim tissue. OCT showed slight elevation nasally in both eyes. Overall thickness normal (119/117).    In conclusion, when the patient saw Dr. Vergara in June, her blood pressure was close to 200 systolic.  She has been able to get this under control and looks good now.  Follow up as needed for worsening symptoms.              Attending Physician Attestation:  Complete documentation of historical and exam elements from today's encounter can be found in the full encounter summary report (not reduplicated in this progress note).  I personally obtained the chief complaint(s) and history of present illness.  I confirmed and edited as necessary the review of systems, past medical/surgical history, family history, social history, and examination findings as documented by others; and I examined the patient myself.  I personally reviewed the  relevant tests, images, and reports as documented above.  I formulated and edited as necessary the assessment and plan and discussed the findings and management plan with the patient and family. - Vladislav Stallworth, MS4

## 2018-10-24 NOTE — NURSING NOTE
Chief Complaints and History of Present Illnesses   Patient presents with     Consult For     HTN retinopathy     HPI    Affected eye(s):  Both   Symptoms:     Blurred vision (Comment: patient notes vision fluctuates, blurry sometimes, better other times)   No double vision   No floaters   No flashes         Do you have eye pain now?:  No      Comments:    Patient denies HA.    Last BGL:190 last night before dinner  Lab Results       Component                Value               Date                       A1C                      8.4                 10/11/2018                 A1C                      9.6                 05/29/2018                 A1C                      9.0                 04/07/2017                 A1C                      12.1                03/10/2016                 A1C                      >16.0               09/30/2014                Tessie Quinteros October 24, 2018 8:47 AM

## 2018-10-24 NOTE — LETTER
10/24/2018         RE:  :  MRN: Pricilla Brown  1975  [id number]     Dear Dr. Vergara,    Thank you for asking me to see your very pleasant patient, Pricilla Brown, in neuro-ophthalmic consultation.  I would like to thank you for sending your records and I have summarized them in the history of present illness.  My assessment and plan are below.  For further details, please see my attached clinic note.      Assessment & Plan   Pricilla Brown is a 43 year old female with the following diagnoses:   1. Hypertensive retinopathy of both eyes    2. Subjective visual disturbance       Ms. Brown is a 42 y/o F here for evaluation of hypertensive retinopathy with indistinct ONH margins in both eyes. She was referred by Dr. Russel Vergara O.D.    Ms Brown has had DM II  (last A1c 8.4) and hypertension (120-140's) systolic in past office visits). Patient endorses times where blood pressure has been near 200. Patient has lost 10-15 lbs in the past month. She wishes to have bariatric surgery, trying to lose 30 lbs to qualify.     Patient notes that vision has had ups and downs, she has correlated with her blood sugars. Notes glare with driving at night.     PMhx of obesity, DM II, Diastolic cardiomyopathy, a fib, h/o PE, HTN hypothyroidism.  She takes metoprolol, hydralazine, warfarin, insulin, topiramate for weight.    Visual acuity today 20/25 and 20/30 +2. Pupils brisk with no rAPD. Pressures 18/19. Color vision and confrontational fields normal. SLE 1+ NS DFE no blurring of margins, healthy rim tissue. OCT showed slight elevation nasally in both eyes. Overall thickness normal (119/117).    In conclusion, when the patient saw Dr. Vergara in , her blood pressure was close to 200 systolic.  She has been able to get this under control and looks good now.  Follow up as needed for worsening symptoms.        Again, thank you for allowing me to participate in the care of your patient.      Sincerely,    Vladislav Samuel,  MD  Professor, Neuro-Ophthalmology  Department of Ophthalmology and Visual Neurosciences  [facility]    CC: Russel Vergara, GIANNA  VIA In Basket     Jamilah Bernal MD  VIA In Basket     Percy Alcala MD  VIA In Basket     Silva Damon MD  VIA In Basket     Norman Jean DPM  VIA In Basket     CLARKE MurciaC  VIA In Basket     Delmi Orellana MD  VIA In Basket     Drea Rosas, TA  VIA In Basket     CLARKE RojasC  VIA In Basket     Tom Guardado MD  VIA In Basket     Ophelia Kenny, RN  VIA In Basket     Kristen Cuello, APRN CNP  VIA In Basket     Ximena Carpenter, ALEXANDER  VIA In Basket     Alexandru Wang, TA  VIA In Basket

## 2018-10-24 NOTE — MR AVS SNAPSHOT
After Visit Summary   10/24/2018    Pricilla Brown    MRN: [id number]           Patient Information     Date Of Birth          1975        Visit Information        Provider Department      10/24/2018 9:00 AM Vladislav Samuel MD [facility] Ophthalmology        Today's Diagnoses     Subjective visual disturbance           Follow-ups after your visit        Your next 10 appointments already scheduled     Oct 25, 2018 11:30 AM CDT   (Arrive by 11:15 AM)   RETURN HEART FAILURE with Percy Alcala MD   [facility] Heart Care ([facility])    [address] [place] [address]   [phone]            Oct 30, 2018 12:30 PM CDT   (Arrive by 12:15 PM)   Return Weight Management Visit with Velvet Mcallister RD   [facility] Surgical Weight Management ([facility])    [address] [place] [address]  [address] [facility] [address]   [phone]            Nov 01, 2018 12:55 PM CDT   (Arrive by 12:40 PM)   Return Visit with Jamilah Beranl MD   [facility] ([facility])    [address] [place] [address]  [address] [facility] [address]   [phone]            Nov 28, 2018  1:00 PM CST   Pool Treatment with Génesis Wheeler, DERECK   [facility] Physical Therapy ([facility])    [address] [place] [address] [address]  [facility] [address]   [phone]            Nov 29, 2018  2:30 PM CST   Return Sleep Patient with YOCASTA Horan [facility] [place] ([facility])    [address] [place] [address]   [phone]            Nov 30, 2018  1:45 PM CST   Pool Treatment with Génesis Wheeler PT   [facility] ([facility])    [address]  [facility] [address]   [phone]            Dec 03, 2018 12:30 PM CST   (Arrive by 12:15 PM)   Return Weight Management Visit  with Steph Kim PA-C   [facility] Medical Weight Management ([facility])    [address]  4th Floor  [facility] [address] [phone]            Dec 07, 2018  3:15 PM CST   Pool Treatment with Chantal Chavis, PT   [facility] Physical Therapy ([facility])    [address]  [address]  [place] [address]   [phone]            Dec 10, 2018  1:00 PM CST   Pool Treatment with Génesis Wheeler, PT   [facility] Physical Therapy ([facility])    [address]  [address]  [place] [address]   [phone]            Dec 12, 2018  1:00 PM CST   Pool Treatment with Génesis Wheeler, PT   [facility] Physical Therapy ([facility])    [address]  [address]  [facility] [address]   [phone]              Who to contact     Please call your clinic at [phone] to:    Ask questions about your health    Make or cancel appointments    Discuss your medicines    Learn about your test results    Speak to your doctor            Additional Information About Your Visit        [employer] Information     [employer] gives you secure access to your electronic health record. If you see a primary care provider, you can also send messages to your care team and make appointments. If you have questions, please call your primary care clinic.  If you do not have a primary care provider, please call [phone] and they will assist you.      [employer] is an electronic gateway that provides easy, online access to your medical records. With [employer], you can request a clinic appointment, read your test results, renew a prescription or communicate with your care team.     To access your existing account, please contact your [facility] or call [phone] for assistance.        Care EveryWhere ID     This is your Care EveryWhere ID. This could be used by other organizations to access your [facility]  medical records  [id number]         Blood Pressure from Last 3 Encounters:   10/11/18 138/68   10/09/18 144/87   09/20/18 135/82    Weight from Last 3 Encounters:   10/11/18 (!) 211.5 kg (466 lb 4.8 oz)   10/09/18 (!) 207 kg (456 lb 4.8 oz)   09/20/18 (!) 206.8 kg (456 lb)              We Performed the Following     Color Vision - Screening OU (both eyes)     DILATED FUNDUS EXAM     IOP Measurement     OCT Optic Nerve RNFL Spectralis OU (both eyes)        Primary Care Provider Office Phone # Fax #    Jamilah Bernal [phone]       [address] [address]        Equal Access to Services     KIERSTEN DRIVER : Maryam oviedoo Sorosmery, waaxda luqadaha, qaybta kaalmada adeegyada, bola hurt . So [facility] [phone].    ATENCIÓN: Si habla español, tiene a de la rosa disposición servicios gratuitos de asistencia lingüística. [facility] [phone].    We comply with applicable federal civil rights laws and [place] laws. We do not discriminate on the basis of race, color, national origin, age, disability, sex, sexual orientation, or gender identity.            Thank you!     Thank you for choosing [facility] OPHTHALMOLOGY  for your care. Our goal is always to provide you with excellent care. Hearing back from our patients is one way we can continue to improve our services. Please take a few minutes to complete the written survey that you may receive in the mail after your visit with us. Thank you!             Your Updated Medication List - Protect others around you: Learn how to safely use, store and throw away your medicines at www.disposemymeds.org.          This list is accurate as of 10/24/18 10:22 AM.  Always use your most recent med list.                   Brand Name Dispense Instructions for use Diagnosis    acetaminophen 325 MG tablet    TYLENOL    180 tablet    Take 2 tablets (650 mg) by mouth 3 times daily as needed for mild pain or fever  (total acetaminophen dose should not exceed 3000 mg per day)    Neuropathic pain of lower extremity, unspecified laterality       albuterol 108 (90 Base) MCG/ACT inhaler    PROAIR HFA/PROVENTIL HFA/VENTOLIN HFA    1 Inhaler    Inhale 2 puffs into the lungs every 6 hours as needed    Mild intermittent asthma without complication       bisacodyl 10 MG Suppository    DULCOLAX    6 suppository    Place 1 suppository (10 mg) rectally daily as needed for constipation    Constipation       blood glucose lancets standard    no brand specified    100 each    USE TO CHECK  blood sugar fasting each am  prelunch and predinner daily OR AS DIRECTED Call clinic to schedule MD APPT.    Diabetes mellitus, type 2 (H)       blood glucose monitoring test strip    no brand specified    100 strip    Use to test blood sugars 3 times daily or as directed    Diabetes mellitus, type 2 (H)       buPROPion 100 MG 12 hr tablet    WELLBUTRIN SR    180 tablet    Take 1 tablet (100 mg) by mouth 2 times daily    Tobacco abuse       capsaicin 0.025 % Crea cream    ZOSTRIX    1 Tube    Apply 1 g topically 3 times daily as needed    Dermatophytosis of nail       ciclopirox 8 % Soln     1 Bottle    Externally apply topically daily To toenails.    Dermatophytosis of nail       * clotrimazole 1 % cream    LOTRIMIN    30 g    Apply topically 2 times daily    Tinea pedis of right foot       * clotrimazole 1 % cream    LOTRIMIN    60 g    Apply topically 2 times daily as needed (skin irritation)    Furuncle of abdominal wall       colchicine 0.6 MG tablet    COLCYRS    30 tablet    Take 1-2 tablets (0.6-1.2 mg) by mouth daily as needed for moderate pain *Call for appointment [phone]. Over 12 months since last seen.    Acute gouty arthritis       diclofenac 1 % Gel topical gel    VOLTAREN    100 g    Apply 4 grams to knees or 2 grams to hands four times daily using enclosed dosing card.    Left foot pain       dulaglutide 1.5 MG/0.5ML pen    TRULICITY     45 mL    Inject 1.5 mg Subcutaneous every 7 days    Type 2 diabetes mellitus with hyperglycemia, with long-term current use of insulin (H)       DULoxetine 20 MG EC capsule    CYMBALTA    60 capsule    Take 1 capsule (20 mg) by mouth 2 times daily    Depression       Efinaconazole 10 % Soln     8 mL    Externally apply topically daily To toenails.    Dermatophytosis of nail       gabapentin 300 MG capsule    NEURONTIN    120 capsule    Take 2 capsules (600 mg) by mouth 2 times daily Needs PCP appt for further refills    Type 2 diabetes mellitus with hyperglycemia, with long-term current use of insulin (H)       hydrALAZINE 25 MG tablet    APRESOLINE    450 tablet    Take 50-75 mg by mouth Take 2 tabs (50 mg) in am, 2 tabs (50 mg) midday, and 3 tabs (75 mg) in pm daily    Dilated cardiomyopathy (H)       * iBG star w/Device Kit     1 kit    -PLEASE GIVE PATIENT A DEVICE HER INSURANCE WILL COVER-    Type 2 diabetes mellitus with hyperglycemia, with long-term current use of insulin (H)       * blood glucose monitoring meter device kit    no brand specified    1 kit    Use to test blood sugar 3 times daily or as directed.    Diabetes mellitus, type 2 (H)       insulin glargine U-300 300 UNIT/ML injection    TOUJEO    30 mL    Inject 170 units SQ each am.    Type 2 diabetes mellitus with hyperglycemia, with long-term current use of insulin (H)       insulin pen needle 32G X 4 MM    BD RIVER U/F    600 each    Use 6 daily or as directed    Diabetes mellitus, type 2 (H)       levothyroxine 200 MCG tablet    SYNTHROID/LEVOTHROID    102 tablet    Take 1 tab Monday thru Saturday and 2 tabs on Sundays.    Other specified hypothyroidism       metolazone 2.5 MG tablet    ZAROXOLYN    60 tablet    Take 1 tablet (2.5 mg) by mouth 2 times daily Take 1/2 hour prior to torsemide    Acute on chronic systolic heart failure (H)       metoprolol succinate 100 MG 24 hr tablet    TOPROL-XL    90 tablet    Take 1 tablet (100 mg) by mouth  daily    Chronic diastolic heart failure (H)       morphine 15 MG 12 hr tablet    MS CONTIN     Take 15 mg by mouth daily as needed        Nebulizer Compressor Kit     1 kit    1 Device 4 times daily as needed.    COPD (chronic obstructive pulmonary disease) (H)       nicotine 14 MG/24HR 24 hr patch    [employer] NICOTINE    30 patch    Place 1 patch onto the skin every 24 hours    Tobacco abuse       NovoLOG FLEXPEN 100 UNIT/ML injection   Generic drug:  insulin aspart     180 mL    Inject 70 units with meals plus correction. Pt uses approx 200 units in 24 hrs.    Type 2 diabetes mellitus with hyperglycemia, with long-term current use of insulin (H)       nystatin cream    MYCOSTATIN    90 g    Apply topically 2 times daily To toenails    Dermatophytosis of nail       order for DME     1 each    Use your CPAP device as directed by your provider. Pressure change to min 13 max 18cwp        * order for DME     1 each    Equipment being ordered: Challenger Wide walker if available - patient needs seat, basket and brakes.    Dilated cardiomyopathy (H), Chronic systolic heart failure (H)       * order for DME     1 each    Equipment being ordered: [id number] $92  Plantar, fasciitis, LG, night splint    Dermatophytosis of nail, Plantar fasciitis of right foot, Diabetic neuropathy with neurologic complication (H), Peroneal tendinitis, right       * order for DME     1 Units    Left foot    Left foot pain, Diabetic neuropathy with neurologic complication (H)       oxyCODONE-acetaminophen 5-325 MG per tablet    PERCOCET    60 tablet    Take 1 tablet by mouth every 8 hours as needed for moderate to severe pain (try to limit use, no further prescriptions until seen in pain clinic)    Lumbago, Bilateral low back pain with sciatica, sciatica laterality unspecified       polyethylene glycol powder    MIRALAX/GLYCOLAX          potassium chloride SA 20 MEQ CR tablet    K-DUR/KLOR-CON M    240 tablet    TAKE TWO TABLETS BY MOUTH  FOUR TIMES A DAY    Hypokalemia       senna 8.6 MG tablet    SENOKOT    45 tablet    Take 1 tablet by mouth 2 times daily    Constipation       spironolactone 50 MG tablet    ALDACTONE    30 tablet    Take 1 tablet (50 mg) by mouth daily        tiotropium 18 MCG capsule    SPIRIVA HANDIHALER    30 capsule    Inhale contents of one capsule daily.    COPD (chronic obstructive pulmonary disease) (H)       topiramate 50 MG tablet    TOPAMAX    270 tablet    Take 2 tablets (100 mg) by mouth daily Take 100 mg for 1 month and increase to 150 mg thereafter    Morbid obesity (H), Type 2 diabetes mellitus with diabetic polyneuropathy, without long-term current use of insulin (H)       torsemide 100 MG tablet    DEMADEX    180 tablet    Take 1 tablet (100 mg) by mouth 2 times daily    Chronic diastolic heart failure (H)       * warfarin 5 MG tablet    COUMADIN    90 tablet    Take one to two tablets daily or as directed by the Coumadin Clinic    Atrial fibrillation, unspecified type (H), Long-term (current) use of anticoagulants       * warfarin 5 MG tablet    COUMADIN    170 tablet    [id number] on [id number], and 20mg on WF, or as directed by the Medication Monitoring Clinic at the [facility].    Atrial fibrillation, unspecified type (H)       * Notice:  This list has 9 medication(s) that are the same as other medications prescribed for you. Read the directions carefully, and ask your doctor or other care provider to review them with you.

## 2018-10-25 ENCOUNTER — OFFICE VISIT (OUTPATIENT)
Dept: CARDIOLOGY | Facility: CLINIC | Age: 43
End: 2018-10-25
Attending: INTERNAL MEDICINE
Payer: MEDICARE

## 2018-10-25 VITALS
OXYGEN SATURATION: 98 % | DIASTOLIC BLOOD PRESSURE: 86 MMHG | HEART RATE: 85 BPM | BODY MASS INDEX: 45.99 KG/M2 | SYSTOLIC BLOOD PRESSURE: 149 MMHG | WEIGHT: 293 LBS | HEIGHT: 67 IN

## 2018-10-25 DIAGNOSIS — I50.23 ACUTE ON CHRONIC SYSTOLIC HEART FAILURE (H): ICD-10-CM

## 2018-10-25 DIAGNOSIS — I50.22 CHRONIC SYSTOLIC HEART FAILURE (H): ICD-10-CM

## 2018-10-25 LAB
ALBUMIN SERPL-MCNC: 2.8 G/DL (ref 3.4–5)
ALP SERPL-CCNC: 89 U/L (ref 40–150)
ALT SERPL W P-5'-P-CCNC: 19 U/L (ref 0–50)
ANION GAP SERPL CALCULATED.3IONS-SCNC: 6 MMOL/L (ref 3–14)
AST SERPL W P-5'-P-CCNC: 9 U/L (ref 0–45)
BILIRUB SERPL-MCNC: 0.2 MG/DL (ref 0.2–1.3)
BUN SERPL-MCNC: 17 MG/DL (ref 7–30)
CALCIUM SERPL-MCNC: 8.5 MG/DL (ref 8.5–10.1)
CHLORIDE SERPL-SCNC: 98 MMOL/L (ref 94–109)
CO2 SERPL-SCNC: 28 MMOL/L (ref 20–32)
CREAT SERPL-MCNC: 1.15 MG/DL (ref 0.52–1.04)
GFR SERPL CREATININE-BSD FRML MDRD: 51 ML/MIN/1.7M2
GLUCOSE SERPL-MCNC: 203 MG/DL (ref 70–99)
POTASSIUM SERPL-SCNC: 4.1 MMOL/L (ref 3.4–5.3)
PROT SERPL-MCNC: 8.6 G/DL (ref 6.8–8.8)
SODIUM SERPL-SCNC: 132 MMOL/L (ref 133–144)

## 2018-10-25 PROCEDURE — 80053 COMPREHEN METABOLIC PANEL: CPT | Performed by: INTERNAL MEDICINE

## 2018-10-25 PROCEDURE — G0463 HOSPITAL OUTPT CLINIC VISIT: HCPCS | Mod: ZF

## 2018-10-25 PROCEDURE — 36415 COLL VENOUS BLD VENIPUNCTURE: CPT | Performed by: INTERNAL MEDICINE

## 2018-10-25 PROCEDURE — 99214 OFFICE O/P EST MOD 30 MIN: CPT | Mod: ZP | Performed by: INTERNAL MEDICINE

## 2018-10-25 ASSESSMENT — PAIN SCALES - GENERAL: PAINLEVEL: SEVERE PAIN (6)

## 2018-10-25 NOTE — NURSING NOTE
Chief Complaint   Patient presents with     Follow Up For      Heart Failure, Dilated cardiomyopathy, A-fib 6 week f/u     Medications reviewed and vitals performed.  Nancy Kong CMA

## 2018-10-25 NOTE — MR AVS SNAPSHOT
After Visit Summary   10/25/2018    Pricilla Brown    MRN: [id number]           Patient Information     Date Of Birth          1975        Visit Information        Provider Department      10/25/2018 11:30 AM Percy Alcala MD [facility]        Today's Diagnoses     Acute on chronic systolic heart failure (H)          Care Instructions    Thank you for your visit today.  Please call me with any questions or concerns.   Alexandru Wang  RN  Cardiology Care Coordinator  [phone], press option 1 then option 3          Follow-ups after your visit        Additional Services     Follow-Up with [facility]       [facility] in 2 months.            Follow-Up with Cardiologist       [facility] in 2 months and Dr. Alcala in 6 months.                  Your next 10 appointments already scheduled     Oct 30, 2018 12:30 PM CDT   (Arrive by 12:15 PM)   Return Weight Management Visit with Velvet Mcallister RD   [facility] Surgical Weight Management ([facility])    [address] [place] [address] [id number] [phone]            Oct 30, 2018  1:30 PM CDT   (Arrive by 1:15 PM)   Office Visit with Lauren Resendez [facility] Medication Therapy Management ([facility])    [address] [place] [address] [id number] [phone]           Bring a current list of meds and any records pertaining to this visit. For Physicals, please bring immunization records and any forms needing to be filled out. Please arrive 10 minutes early to complete paperwork.            Nov 01, 2018 12:55 PM CDT   (Arrive by 12:40 PM)   Return Visit with Jamilah Bernal MD   [facility] ([facility])    [address] [place] [address] [id number] [phone]            Nov 28, 2018  1:00 PM CST   Pool Treatment with Génesis Wheeler, [facility] ([facility])    [address]  [address]  [place] [address]   [phone]            Nov 29, 2018  2:30 PM CST   Return Sleep Patient with YOCASTA Horan CNP   [facility] ([facility])    [address] [place] [address]   [phone]            Nov 30, 2018  1:45 PM CST   Pool Treatment with Géneiss Wheeler, PT   [facility] Physical Therapy ([facility])    [address]  [place] [address]   [phone]            Dec 03, 2018 12:30 PM CST   (Arrive by 12:15 PM)   Return Weight Management Visit with Steph Kim PA-C   [facility] Medical Weight Management ([email or web address])    [address] [facility]  4th Floor  [facility] [address]   [phone]            Dec 07, 2018  3:15 PM CST   Pool Treatment with Chantal Chavis, PT   [facility] Physical Therapy ([facility])    [address]  [place] [address]   [phone]            Dec 10, 2018  1:00 PM CST   Pool Treatment with Génesis Wheeler, DERECK   [facility] Physical Therapy ([facility])    [address]  [place] [address]   [phone]            Dec 12, 2018  1:00 PM CST   Pool Treatment with Génesis Wheeler, DERECK   [facility] Physical Therapy ([facility])    [address] [place] [address] [address]  [place] [address]   [phone]              Future tests that were ordered for you today     Open Future Orders        Priority Expected Expires Ordered    Follow-Up with Cardiologist Routine 4/23/2019 10/25/2019 10/25/2018    Follow-Up with [facility] Routine 11/1/2018 1/30/2019 10/25/2018            Who to contact     If you have questions or need follow up information about today's clinic visit or your schedule please contact [facility] directly at [phone].  Normal or non-critical lab and imaging results will  "be communicated to you by MyChart, letter or phone within 4 business days after the clinic has received the results. If you do not hear from us within 7 days, please contact the clinic through Verient or phone. If you have a critical or abnormal lab result, we will notify you by phone as soon as possible.  Submit refill requests through Verient or call your pharmacy and they will forward the refill request to us. Please allow 3 business days for your refill to be completed.          Additional Information About Your Visit        Verient Information     Verient gives you secure access to your electronic health record. If you see a primary care provider, you can also send messages to your care team and make appointments. If you have questions, please call your primary care clinic.  If you do not have a primary care provider, please call [phone] and they will assist you.        Care EveryWhere ID     This is your Care EveryWhere ID. This could be used by other organizations to access your [facility] medical records  [id number]        Your Vitals Were     Pulse Height Pulse Oximetry BMI (Body Mass Index)          85 1.702 m (5' 7\") 98% 72.99 kg/m2         Blood Pressure from Last 3 Encounters:   10/25/18 149/86   10/11/18 138/68   10/09/18 144/87    Weight from Last 3 Encounters:   10/25/18 (!) 211.4 kg (466 lb)   10/11/18 (!) 211.5 kg (466 lb 4.8 oz)   10/09/18 (!) 207 kg (456 lb 4.8 oz)              We Performed the Following     Follow-Up with Advanced Heart Failure Cardiologist        Primary Care Provider Office Phone # Fax #    Jamilah Bernal [phone] [address] [address]        Equal Access to Services     KIERSTEN DRIVER : Maryam Woodward, malinda campos, ellen montalvoalmamk shah, bola walker. So [facility] [phone].    ATENCIÓN: Si habla español, tiene a de la rosa disposición servicios gratuitos de asistencia " lingüísticaLester Zuñiga al [phone].    We comply with applicable federal civil rights laws and [place] laws. We do not discriminate on the basis of race, color, national origin, age, disability, sex, sexual orientation, or gender identity.            Thank you!     Thank you for choosing [facility]  for your care. Our goal is always to provide you with excellent care. Hearing back from our patients is one way we can continue to improve our services. Please take a few minutes to complete the written survey that you may receive in the mail after your visit with us. Thank you!             Your Updated Medication List - Protect others around you: Learn how to safely use, store and throw away your medicines at www.disposemymeds.org.          This list is accurate as of 10/25/18 12:00 PM.  Always use your most recent med list.                   Brand Name Dispense Instructions for use Diagnosis    acetaminophen 325 MG tablet    TYLENOL    180 tablet    Take 2 tablets (650 mg) by mouth 3 times daily as needed for mild pain or fever (total acetaminophen dose should not exceed 3000 mg per day)    Neuropathic pain of lower extremity, unspecified laterality       albuterol 108 (90 Base) MCG/ACT inhaler    PROAIR HFA/PROVENTIL HFA/VENTOLIN HFA    1 Inhaler    Inhale 2 puffs into the lungs every 6 hours as needed    Mild intermittent asthma without complication       bisacodyl 10 MG Suppository    DULCOLAX    6 suppository    Place 1 suppository (10 mg) rectally daily as needed for constipation    Constipation       blood glucose lancets standard    no brand specified    100 each    USE TO CHECK  blood sugar fasting each am  prelunch and predinner daily OR AS DIRECTED Call clinic to schedule MD APPT.    Diabetes mellitus, type 2 (H)       blood glucose monitoring test strip    no brand specified    100 strip    Use to test blood sugars 3 times daily or as directed    Diabetes mellitus, type 2 (H)       buPROPion 100  MG 12 hr tablet    WELLBUTRIN SR    180 tablet    Take 1 tablet (100 mg) by mouth 2 times daily    Tobacco abuse       capsaicin 0.025 % Crea cream    ZOSTRIX    1 Tube    Apply 1 g topically 3 times daily as needed    Dermatophytosis of nail       ciclopirox 8 % Soln     1 Bottle    Externally apply topically daily To toenails.    Dermatophytosis of nail       * clotrimazole 1 % cream    LOTRIMIN    30 g    Apply topically 2 times daily    Tinea pedis of right foot       * clotrimazole 1 % cream    LOTRIMIN    60 g    Apply topically 2 times daily as needed (skin irritation)    Furuncle of abdominal wall       colchicine 0.6 MG tablet    COLCYRS    30 tablet    Take 1-2 tablets (0.6-1.2 mg) by mouth daily as needed for moderate pain *Call for appointment [phone]. Over 12 months since last seen.    Acute gouty arthritis       diclofenac 1 % Gel topical gel    VOLTAREN    100 g    Apply 4 grams to knees or 2 grams to hands four times daily using enclosed dosing card.    Left foot pain       dulaglutide 1.5 MG/0.5ML pen    TRULICITY    45 mL    Inject 1.5 mg Subcutaneous every 7 days    Type 2 diabetes mellitus with hyperglycemia, with long-term current use of insulin (H)       DULoxetine 20 MG EC capsule    CYMBALTA    60 capsule    Take 1 capsule (20 mg) by mouth 2 times daily    Depression       Efinaconazole 10 % Soln     8 mL    Externally apply topically daily To toenails.    Dermatophytosis of nail       gabapentin 300 MG capsule    NEURONTIN    120 capsule    Take 2 capsules (600 mg) by mouth 2 times daily Needs PCP appt for further refills    Type 2 diabetes mellitus with hyperglycemia, with long-term current use of insulin (H)       hydrALAZINE 25 MG tablet    APRESOLINE    450 tablet    Take 50-75 mg by mouth Take 2 tabs (50 mg) in am, 2 tabs (50 mg) midday, and 3 tabs (75 mg) in pm daily    Dilated cardiomyopathy (H)       * iBG star w/Device Kit     1 kit    -PLEASE GIVE PATIENT A DEVICE HER  INSURANCE WILL COVER-    Type 2 diabetes mellitus with hyperglycemia, with long-term current use of insulin (H)       * blood glucose monitoring meter device kit    no brand specified    1 kit    Use to test blood sugar 3 times daily or as directed.    Diabetes mellitus, type 2 (H)       insulin glargine U-300 300 UNIT/ML injection    TOUJEO    30 mL    Inject 170 units SQ each am.    Type 2 diabetes mellitus with hyperglycemia, with long-term current use of insulin (H)       insulin pen needle 32G X 4 MM    BD RIVER U/F    600 each    Use 6 daily or as directed    Diabetes mellitus, type 2 (H)       levothyroxine 200 MCG tablet    SYNTHROID/LEVOTHROID    102 tablet    Take 1 tab Monday thru Saturday and 2 tabs on Sundays.    Other specified hypothyroidism       metolazone 2.5 MG tablet    ZAROXOLYN    60 tablet    Take 1 tablet (2.5 mg) by mouth 2 times daily Take 1/2 hour prior to torsemide    Acute on chronic systolic heart failure (H)       metoprolol succinate 100 MG 24 hr tablet    TOPROL-XL    90 tablet    Take 1 tablet (100 mg) by mouth daily    Chronic diastolic heart failure (H)       morphine 15 MG 12 hr tablet    MS CONTIN     Take 15 mg by mouth daily as needed        Nebulizer Compressor Kit     1 kit    1 Device 4 times daily as needed.    COPD (chronic obstructive pulmonary disease) (H)       nicotine 14 MG/24HR 24 hr patch    [employer] NICOTINE    30 patch    Place 1 patch onto the skin every 24 hours    Tobacco abuse       NovoLOG FLEXPEN 100 UNIT/ML injection   Generic drug:  insulin aspart     180 mL    Inject 70 units with meals plus correction. Pt uses approx 200 units in 24 hrs.    Type 2 diabetes mellitus with hyperglycemia, with long-term current use of insulin (H)       nystatin cream    MYCOSTATIN    90 g    Apply topically 2 times daily To toenails    Dermatophytosis of nail       order for DME     1 each    Use your CPAP device as directed by your provider. Pressure change to min 13 max 18cwp         * order for DME     1 each    Equipment being ordered: Challenger Wide walker if available - patient needs seat, basket and brakes.    Dilated cardiomyopathy (H), Chronic systolic heart failure (H)       * order for DME     1 each    Equipment being ordered:  [id number] $92  Plantar, fasciitis, LG, night splint    Dermatophytosis of nail, Plantar fasciitis of right foot, Diabetic neuropathy with neurologic complication (H), Peroneal tendinitis, right       * order for DME     1 Units    Left foot    Left foot pain, Diabetic neuropathy with neurologic complication (H)       oxyCODONE-acetaminophen 5-325 MG per tablet    PERCOCET    60 tablet    Take 1 tablet by mouth every 8 hours as needed for moderate to severe pain (try to limit use, no further prescriptions until seen in pain clinic)    Lumbago, Bilateral low back pain with sciatica, sciatica laterality unspecified       polyethylene glycol powder    MIRALAX/GLYCOLAX          potassium chloride SA 20 MEQ CR tablet    K-DUR/KLOR-CON M    240 tablet    TAKE TWO TABLETS BY MOUTH FOUR TIMES A DAY    Hypokalemia       senna 8.6 MG tablet    SENOKOT    45 tablet    Take 1 tablet by mouth 2 times daily    Constipation       spironolactone 50 MG tablet    ALDACTONE    30 tablet    Take 1 tablet (50 mg) by mouth daily        tiotropium 18 MCG capsule    SPIRIVA HANDIHALER    30 capsule    Inhale contents of one capsule daily.    COPD (chronic obstructive pulmonary disease) (H)       topiramate 50 MG tablet    TOPAMAX    270 tablet    Take 2 tablets (100 mg) by mouth daily Take 100 mg for 1 month and increase to 150 mg thereafter    Morbid obesity (H), Type 2 diabetes mellitus with diabetic polyneuropathy, without long-term current use of insulin (H)       torsemide 100 MG tablet    DEMADEX    180 tablet    Take 1 tablet (100 mg) by mouth 2 times daily    Chronic diastolic heart failure (H)       * warfarin 5 MG tablet    COUMADIN    90 tablet    Take one to  two tablets daily or as directed by the Coumadin Clinic    Atrial fibrillation, unspecified type (H), Long-term (current) use of anticoagulants       * warfarin 5 MG tablet    COUMADIN    170 tablet    [id number] on [id number], and 20mg on , or as directed by the Medication Monitoring Clinic at the [facility].    Atrial fibrillation, unspecified type (H)       * Notice:  This list has 9 medication(s) that are the same as other medications prescribed for you. Read the directions carefully, and ask your doctor or other care provider to review them with you.

## 2018-10-25 NOTE — LETTER
10/25/2018      RE: Pricilla Brown  [address]  [facility] [address]       Dear Colleague,    Thank you for the opportunity to participate in the care of your patient, Pricilla Brown, at the [facility]. Please see a copy of my visit note below.    Atrial fibrillation    SOB (shortness of breath  Chronic diastolic heart failure    Dilated cardiomyopathy    Chronic atrial fibrillation    Cellulitis  Long-term  use of anticoagulants    Plantar fasciitis  Neuropathic pain of lower extremity  Peritonsillar abscess  Asthma  Azotemia  Hypothyroidism following radioiodine therapy  JYOTI (obstructive sleep apnea) CPAP Min Pressure of 13 and Max Pressure of 18  Chronic anticoagulation for a-fib  Morbid obesity    Diabetes mellitus, type 2    The patient returns for follow-up of heart failure.  There is no interim history of chest pain, tightness, paroxysmal nocturnal dyspnea, orthopnea, peripheral edema, palpitation, pre-syncope, syncope, device discharge.  Exercise tolerance is stable.  The patient is attempting to exercise regularly and following a sodium restricted, calorically appropriate diet.  Medications are reviewed and the patient is taking medications as prescribed.  The patient is generally sleeping well.     REVIEW of SYMPTOMS    Constitutional: without fever, chills, night sweats.  Weight is increased  HEENT: without dry eyes, dry mouth, sinusitis, corryza, visual changes  Endocrine: without polyuria, polydypsia, polyphagia, heat or cold intolerance, changing mental status  Cardiology: without chest pain, tightness, heaviness, pressure, paroxysmal dyspnea, orthopnea, palpitation, pre-syncope or syncope or device discharge (if present)  Pulmonary: without asthma, wheezing, cough, hemoptysis  GI: without nausea, emesis, jaundice, pain, hematemesis, melena  : without frequency, urgency, hematuria, stones, pain, abnormal bleeding, frequency,  urgency  Neurologic: without TIA, CVA, trauma, seizure  Dermatologic: without lesions, abrasion rash,   Orthopedic/Rheum: without significant joint pain, impairment, limb, polyserositis, ulceration, Raynauds  Heme: without mass, bruising, frequent infection, anemia  Psychiatric: without substance abuse, hallucination, medication, depression    Exercise tolerance:    Alert, oriented, normal gait and station, normal mental, JVP within normal limits, HJR negative,thyroid not enlarged, mucous membranes john save fo basilar rale, abdomen without tenderness, rebound or guarding, skin clear of lesion, PMI normal, S1 S2 regular rhythm, no gallop, no edema    Wt Readings from Last 5 Encounters:   10/25/18 (!) 211.4 kg (466 lb)   10/11/18 (!) 211.5 kg (466 lb 4.8 oz)   10/09/18 (!) 207 kg (456 lb 4.8 oz)   09/20/18 (!) 206.8 kg (456 lb)   09/11/18 (!) 210.7 kg (464 lb 9.6 oz)     Labs: pending  Current Outpatient Prescriptions   Medication     acetaminophen (TYLENOL) 325 MG tablet     albuterol (PROAIR HFA/PROVENTIL HFA/VENTOLIN HFA) 108 (90 Base) MCG/ACT inhaler     bisacodyl (DULCOLAX) 10 MG suppository     blood glucose (NO BRAND SPECIFIED) lancets standard     blood glucose monitoring (NO BRAND SPECIFIED) meter device kit     blood glucose monitoring (NO BRAND SPECIFIED) test strip     Blood Glucose Monitoring Suppl (IBG STAR) W/DEVICE KIT     buPROPion (WELLBUTRIN SR) 100 MG 12 hr tablet     capsaicin (ZOSTRIX) 0.025 % CREA cream     ciclopirox 8 % SOLN     clotrimazole (LOTRIMIN) 1 % cream     clotrimazole (LOTRIMIN) 1 % cream     colchicine (COLCYRS) 0.6 MG tablet     diclofenac (VOLTAREN) 1 % GEL topical gel     dulaglutide (TRULICITY) 1.5 MG/0.5ML pen     DULoxetine (CYMBALTA) 20 MG capsule     Efinaconazole 10 % SOLN     gabapentin (NEURONTIN) 300 MG capsule     hydrALAZINE (APRESOLINE) 25 MG tablet     insulin glargine U-300 (TOUJEO) 300 UNIT/ML injection     insulin pen needle (BD RIVER U/F) 32G X 4 MM      levothyroxine (SYNTHROID/LEVOTHROID) 200 MCG tablet     metolazone (ZAROXOLYN) 2.5 MG tablet     metoprolol succinate (TOPROL-XL) 100 MG 24 hr tablet     morphine (MS CONTIN) 15 MG 12 hr tablet     NOVOLOG FLEXPEN 100 UNIT/ML soln     nystatin (MYCOSTATIN) cream     order for DME     order for DME     order for DME     ORDER FOR DME     oxyCODONE-acetaminophen (PERCOCET) 5-325 MG per tablet     polyethylene glycol (MIRALAX/GLYCOLAX) powder     potassium chloride SA (K-DUR/KLOR-CON M) 20 MEQ CR tablet     Respiratory Therapy Supplies (NEBULIZER COMPRESSOR) KIT     senna (SENOKOT) 8.6 MG tablet     spironolactone (ALDACTONE) 50 MG tablet     tiotropium (SPIRIVA HANDIHALER) 18 MCG inhalation capsule     topiramate (TOPAMAX) 50 MG tablet     torsemide (DEMADEX) 100 MG tablet     warfarin (COUMADIN) 5 MG tablet     warfarin (COUMADIN) 5 MG tablet     nicotine ([employer] NICOTINE) 14 MG/24HR patch 2h hr     No current facility-administered medications for this visit.      Assessment:    Blood pressure is increased and weight is up.  She has not be able to reliably take QID hydralazine    Patient reports decreasing efficacy of diuretics.  Will need to see labs before adjusting    1. Increase hydralazine to 25mgs am, 50mgs at noon, 75mgs in the evening.    2. Continue weight loss  3. Labs today: CMP, INR, hemoglobin  4. Referral: [facility] for pool therapy: arthritis, weight loss  5. RTC for blood pressure and diuretic assessment  6. Bariatric surgery is the optimal treatment for this patient.  I would that with intense encouragement and frequent bariatric visits, patient can lose enough weight to qualify for surgery.    7. I wonder about the contribution of Cymbalta to her weight maintenance.  Bupropion may have less weight retentive actions, thought may not work as well in women.          Percy Alcala MD

## 2018-10-25 NOTE — PROGRESS NOTES
Atrial fibrillation    SOB (shortness of breath  Chronic diastolic heart failure    Dilated cardiomyopathy    Chronic atrial fibrillation    Cellulitis  Long-term  use of anticoagulants    Plantar fasciitis  Neuropathic pain of lower extremity  Peritonsillar abscess  Asthma  Azotemia  Hypothyroidism following radioiodine therapy  JYOTI (obstructive sleep apnea) CPAP Min Pressure of 13 and Max Pressure of 18  Chronic anticoagulation for a-fib  Morbid obesity    Diabetes mellitus, type 2    The patient returns for follow-up of heart failure.  There is no interim history of chest pain, tightness, paroxysmal nocturnal dyspnea, orthopnea, peripheral edema, palpitation, pre-syncope, syncope, device discharge.  Exercise tolerance is stable.  The patient is attempting to exercise regularly and following a sodium restricted, calorically appropriate diet.  Medications are reviewed and the patient is taking medications as prescribed.  The patient is generally sleeping well.     REVIEW of SYMPTOMS    Constitutional: without fever, chills, night sweats.  Weight is increased  HEENT: without dry eyes, dry mouth, sinusitis, corryza, visual changes  Endocrine: without polyuria, polydypsia, polyphagia, heat or cold intolerance, changing mental status  Cardiology: without chest pain, tightness, heaviness, pressure, paroxysmal dyspnea, orthopnea, palpitation, pre-syncope or syncope or device discharge (if present)  Pulmonary: without asthma, wheezing, cough, hemoptysis  GI: without nausea, emesis, jaundice, pain, hematemesis, melena  : without frequency, urgency, hematuria, stones, pain, abnormal bleeding, frequency, urgency  Neurologic: without TIA, CVA, trauma, seizure  Dermatologic: without lesions, abrasion rash,   Orthopedic/Rheum: without significant joint pain, impairment, limb, polyserositis, ulceration, Raynauds  Heme: without mass, bruising, frequent infection, anemia  Psychiatric: without substance abuse, hallucination,  medication, depression    Exercise tolerance:    Alert, oriented, normal gait and station, normal mental, JVP within normal limits, HJR negative,thyroid not enlarged, mucous membranes john save fo basilar rale, abdomen without tenderness, rebound or guarding, skin clear of lesion, PMI normal, S1 S2 regular rhythm, no gallop, no edema    Wt Readings from Last 5 Encounters:   10/25/18 (!) 211.4 kg (466 lb)   10/11/18 (!) 211.5 kg (466 lb 4.8 oz)   10/09/18 (!) 207 kg (456 lb 4.8 oz)   09/20/18 (!) 206.8 kg (456 lb)   09/11/18 (!) 210.7 kg (464 lb 9.6 oz)     Labs: pending  Current Outpatient Prescriptions   Medication     acetaminophen (TYLENOL) 325 MG tablet     albuterol (PROAIR HFA/PROVENTIL HFA/VENTOLIN HFA) 108 (90 Base) MCG/ACT inhaler     bisacodyl (DULCOLAX) 10 MG suppository     blood glucose (NO BRAND SPECIFIED) lancets standard     blood glucose monitoring (NO BRAND SPECIFIED) meter device kit     blood glucose monitoring (NO BRAND SPECIFIED) test strip     Blood Glucose Monitoring Suppl (IBG STAR) W/DEVICE KIT     buPROPion (WELLBUTRIN SR) 100 MG 12 hr tablet     capsaicin (ZOSTRIX) 0.025 % CREA cream     ciclopirox 8 % SOLN     clotrimazole (LOTRIMIN) 1 % cream     clotrimazole (LOTRIMIN) 1 % cream     colchicine (COLCYRS) 0.6 MG tablet     diclofenac (VOLTAREN) 1 % GEL topical gel     dulaglutide (TRULICITY) 1.5 MG/0.5ML pen     DULoxetine (CYMBALTA) 20 MG capsule     Efinaconazole 10 % SOLN     gabapentin (NEURONTIN) 300 MG capsule     hydrALAZINE (APRESOLINE) 25 MG tablet     insulin glargine U-300 (TOUJEO) 300 UNIT/ML injection     insulin pen needle (BD RIVER U/F) 32G X 4 MM     levothyroxine (SYNTHROID/LEVOTHROID) 200 MCG tablet     metolazone (ZAROXOLYN) 2.5 MG tablet     metoprolol succinate (TOPROL-XL) 100 MG 24 hr tablet     morphine (MS CONTIN) 15 MG 12 hr tablet     NOVOLOG FLEXPEN 100 UNIT/ML soln     nystatin (MYCOSTATIN) cream     order for DME     order for DME     order for DME     ORDER  FOR DME     oxyCODONE-acetaminophen (PERCOCET) 5-325 MG per tablet     polyethylene glycol (MIRALAX/GLYCOLAX) powder     potassium chloride SA (K-DUR/KLOR-CON M) 20 MEQ CR tablet     Respiratory Therapy Supplies (NEBULIZER COMPRESSOR) KIT     senna (SENOKOT) 8.6 MG tablet     spironolactone (ALDACTONE) 50 MG tablet     tiotropium (SPIRIVA HANDIHALER) 18 MCG inhalation capsule     topiramate (TOPAMAX) 50 MG tablet     torsemide (DEMADEX) 100 MG tablet     warfarin (COUMADIN) 5 MG tablet     warfarin (COUMADIN) 5 MG tablet     nicotine ([employer] NICOTINE) 14 MG/24HR patch 2h hr     No current facility-administered medications for this visit.      Assessment:    Blood pressure is increased and weight is up.  She has not be able to reliably take QID hydralazine    Patient reports decreasing efficacy of diuretics.  Will need to see labs before adjusting    1. Increase hydralazine to 25mgs am, 50mgs at noon, 75mgs in the evening.    2. Continue weight loss  3. Labs today: CMP, INR, hemoglobin  4. Referral: Neftali Smallwood for pool therapy: arthritis, weight loss  5. RTC for blood pressure and diuretic assessment  6. Bariatric surgery is the optimal treatment for this patient.  I would that with intense encouragement and frequent bariatric visits, patient can lose enough weight to qualify for surgery.    7. I wonder about the contribution of Cymbalta to her weight maintenance.  Bupropion may have less weight retentive actions, thought may not work as well in women.

## 2018-10-25 NOTE — PATIENT INSTRUCTIONS
Thank you for your visit today.  Please call me with any questions or concerns.   Alexandru Wang RN  Cardiology Care Coordinator  [phone], press option 1 then option 3

## 2018-10-30 ENCOUNTER — ANTICOAGULATION THERAPY VISIT (OUTPATIENT)
Dept: ANTICOAGULATION | Facility: CLINIC | Age: 43
End: 2018-10-30

## 2018-10-30 ENCOUNTER — OFFICE VISIT (OUTPATIENT)
Dept: SURGERY | Facility: CLINIC | Age: 43
End: 2018-10-30
Payer: MEDICARE

## 2018-10-30 ENCOUNTER — OFFICE VISIT (OUTPATIENT)
Dept: PHARMACY | Facility: CLINIC | Age: 43
End: 2018-10-30
Payer: MEDICAID

## 2018-10-30 VITALS — WEIGHT: 293 LBS | BODY MASS INDEX: 74.25 KG/M2

## 2018-10-30 DIAGNOSIS — Z79.01 LONG TERM CURRENT USE OF ANTICOAGULANT THERAPY: ICD-10-CM

## 2018-10-30 DIAGNOSIS — Z72.0 TOBACCO ABUSE: Primary | ICD-10-CM

## 2018-10-30 DIAGNOSIS — I48.91 ATRIAL FIBRILLATION (H): ICD-10-CM

## 2018-10-30 LAB — INR PPP: 2.13 (ref 0.86–1.14)

## 2018-10-30 PROCEDURE — 99207 ZZC NO CHARGE LOS: CPT | Performed by: PHARMACIST

## 2018-10-30 RX ORDER — NICOTINE 21 MG/24HR
1 PATCH, TRANSDERMAL 24 HOURS TRANSDERMAL EVERY 24 HOURS
Qty: 90 PATCH | Refills: 2 | Status: SHIPPED | OUTPATIENT
Start: 2018-10-30 | End: 2018-12-30

## 2018-10-30 NOTE — PATIENT INSTRUCTIONS
GOALS:  Relating To Eating:  -Follow the Modified Liquid Diet for weight loss:    Breakfast: Protein Shake (160-250 Jagjit, 20-35 g protein)  Lunch: 3 oz lean protein (chicken/turkey breast or fish)  + non-starchy vegetables (non-starchy veg: green leafy vegetables, cucumbers, broccoli, cauliflower, green string beans)  Supper: 3 oz lean protein (chicken/turkey breast or fish)  + non-starchy vegetables (non-starchy veg: green leafy vegetables, cucumbers, broccoli, cauliflower, green string beans)  Snack: 1 protein bar + non-starchy vegetables (no calorie-containing dips/condiments)  Beverages: at least 48-64 oz water between meals daily (Powerade Zero or Propel Zero or Crystal Light or Isael, less than 5 Calories per serving)    *Protein Shake Criteria: no more than 250 Calories, at least 20 grams of protein, and less than 10 grams of sugar     Frozen Meal Replacements  Healthy Choice  Lean Cuisine  Atkins Meals  Smart Ones    -Continue eating slowly (20-30 minutes per meal), chewing foods well (25 chews per bite/applesauce consistency)    -Increase exercise as able.  (Pool therapy, walking, etc)    -Continue to work on quitting smoking.     Follow up with RD in one month    Velvet Mcallister RD, LD  If you need to schedule or reschedule with a dietitian please call [phone].

## 2018-10-30 NOTE — LETTER
"10/30/2018       RE: Pricilla Brown  [address]  [facility] [address]     Dear Colleague,    Thank you for referring your patient, Pricilla Brown, to the [facility] SURGICAL WE[facility] MANAGEMENT at [facility]. Please see a copy of my visit note below.      Bariatric Nutrition Consultation Note    Reason For Visit: Nutrition Reassessment    Pricilla Brown is a 43 year-old (type 2 DM on insulin sliding scale) presenting today for bariatric nutrition consult.  Pt is interested in laparoscopic sleeve gastrectomy.  Pt has met all required RD visits prior to surgery.  Pt was referred by NANETTE Kerr and Dr. Silva Damon.    Patient signed ABN 9/20/18-9/20/19.    Coordination Notes:,   5/29/18: In light of it being several months before anticipating surgery, writer will revisit task list when Pt is closer to weight loss goal.  Writer will continue to encourage Pt to come at least monthly to see RD.    ANTHROPOMETRICS:  Ht: 67\"  Initial Wt: 462.6 lbs with BMI of 72.6  Current Wt: 474.1 lbs (+19.5 lbs over the past month; +11.5 lbs from initial weight)     Required weight loss goal pre-op: -46 lbs from initial consult weight (goal weight 416.6 lbs or less before surgery)    NUTRITION HISTORY:  Food Allergies/Intolerances: none known  Cravings: sweets, chips, pop (diet)  Triggers/cues causing extra eating: boredom (currently on disability, not working)  *Pt is on topiramate per Dr. Silva Damon who would like Pt to re-try modified liquid diet with 2 shakes/day.    Recent food recall:  Protein shake in the morning  Lunch- shredded beef and grits(butter)  Dinner- beef stew(canned)   Apple and granola bar  Beverages- coffee, tea with splenda    Progress with Previous Goals:  Relating To Eating:  -Follow the Modified Liquid Diet for weight loss:  continues  Breakfast: Protein Shake (160-250 Jagjit, 20-35 g protein)  Lunch: 3 oz lean protein (chicken/turkey breast or " fish)  + non-starchy vegetables (non-starchy veg: green leafy vegetables, cucumbers, broccoli, cauliflower, green string beans)  Supper: 3 oz lean protein (chicken/turkey breast or fish)  + non-starchy vegetables (non-starchy veg: green leafy vegetables, cucumbers, broccoli, cauliflower, green string beans)  Snack: 1 protein bar + non-starchy vegetables (no calorie-containing dips/condiments)  Beverages: at least 48-64 oz water between meals daily (Powerade Zero or Propel Zero or Crystal Light or Isael, less than 5 Calories per serving)    *Protein Shake Criteria: no more than 250 Calories, at least 20 grams of protein, and less than 10 grams of sugar     Frozen Meal Replacements  Healthy Choice  Lean Cuisine  Atkins Meals  Smart Ones    -Continue eating slowly (20-30 minutes per meal), chewing foods well (25 chews per bite/applesauce consistency) continues    -Increase exercise as able.  (Pool therapy, walking, etc) start pool therapy next month, walking daily ~1 block    -Continue to work on quitting smoking. 10 per day    NUTRITION DIAGNOSIS:  Obesity r/t long history of self-monitoring deficit and excessive energy intake aeb BMI >30.- continues    INTERVENTION:  Intervention Provided/Education Provided:  Patient reported that she tried two shakes per day and felt like she got so hungry that she over ate in the evening.  She stated that she has not tried two frozen meals and the protein shake, encouraged starting with this and if unable to maintain this diet to try a protein shake in the morning eat lunch and use a meal replacement again in the evening.  Gave encouragement and support.  Provided Pt with list of goals and task list.      Note: Pt stated she did not purchase clinic pharmaceutical grade meal replacements due to financial challenges.   10/30/18- patient was interested in review price information with her significant other at home.    Patient Understanding: good  Expected Compliance: good       GOALS:  Relating To Eating:  -Follow the Modified Liquid Diet for weight loss:    Breakfast: Protein Shake (160-250 Jagjit, 20-35 g protein)  Lunch: 3 oz lean protein (chicken/turkey breast or fish)  + non-starchy vegetables (non-starchy veg: green leafy vegetables, cucumbers, broccoli, cauliflower, green string beans)  Supper: 3 oz lean protein (chicken/turkey breast or fish)  + non-starchy vegetables (non-starchy veg: green leafy vegetables, cucumbers, broccoli, cauliflower, green string beans)  Snack: 1 protein bar + non-starchy vegetables (no calorie-containing dips/condiments)  Beverages: at least 48-64 oz water between meals daily (Powerade Zero or Propel Zero or Crystal Light or [place], less than 5 Calories per serving)    *Protein Shake Criteria: no more than 250 Calories, at least 20 grams of protein, and less than 10 grams of sugar     Frozen Meal Replacements  Healthy Choice  Lean Cuisine  Atkins Meals  Smart Ones    -Continue eating slowly (20-30 minutes per meal), chewing foods well (25 chews per bite/applesauce consistency)    -Increase exercise as able.  (Pool therapy, walking, etc)    -Continue to work on quitting smoking.       Follow-Up: follow-up with RD in 1 month.      Time spent with patient: 15 minutes     Velvet Mcallister RD, LD

## 2018-10-30 NOTE — MR AVS SNAPSHOT
MRN:[id number]                      After Visit Summary   10/30/2018    Pricilla rBown    MRN: [id number]           Visit Information        Provider Department      10/30/2018 12:30 PM Velvet Mcallister RD [facility] Surgical Weight Management        Your next 10 appointments already scheduled     Oct 30, 2018  1:30 PM CDT   (Arrive by 1:15 PM)   Office Visit with Lauren Resendez RPH   [facility] Medication Therapy Management ([facility])    [address] [place] [address]   [phone]           Bring a current list of meds and any records pertaining to this visit. For Physicals, please bring immunization records and any forms needing to be filled out. Please arrive 10 minutes early to complete paperwork.            Nov 01, 2018 12:55 PM CDT   (Arrive by 12:40 PM)   Return Visit with Jamilah Bernal MD   [facility] ([facility])    [address] [place] [address]   [phone]            Nov 15, 2018 10:30 AM CST   Return Visit with Norman Jean DPM   [facility] ([facility])    [address] [place] [address]   [phone]            Nov 28, 2018  1:00 PM CST   Pool Treatment with Génesis Wheeler, DERECK   [facility] ([facility])    [address]  [address]  [facility] [address]   [phone]            Nov 29, 2018  2:30 PM CST   Return Sleep Patient with YOCASTA Horan [facility] ([facility])    [address] [facility] [address]   [phone]            Nov 30, 2018  1:45 PM CST   Pool Treatment with Génesis Wheeler PT   [facility] ([facility])    [address] [place] [address]  [address]  [facility] [address]   [phone]            Dec 03, 2018 12:30 PM CST    (Arrive by 12:15 PM)   Return Weight Management Visit with Steph Kim PA-C   [facility] ([facility])    [address] [facility] [facility] [address]  [address]th [address]  [facility] [address]   [phone]            Dec 07, 2018  3:15 PM CST   Pool Treatment with Chantal Chavis, PT   [facility] ([facility])    [address]  [address]  [facility] [address]   [phone]            Dec 10, 2018  1:00 PM CST   Pool Treatment with Génesis Wheeler, PT   [facility] ([facility])    [address]  [address]  [facility] [address]   [phone]            Dec 12, 2018  1:00 PM CST   Pool Treatment with Chantal Chavis, PT   [facility] ([facility])    [address]  [facility] [address]   [phone]              Care Instructions    GOALS:  Relating To Eating:  -Follow the Modified Liquid Diet for weight loss:    Breakfast: Protein Shake (160-250 Jagjit, 20-35 g protein)  Lunch: 3 oz lean protein (chicken/turkey breast or fish)  + non-starchy vegetables (non-starchy veg: green leafy vegetables, cucumbers, broccoli, cauliflower, green string beans)  Supper: 3 oz lean protein (chicken/turkey breast or fish)  + non-starchy vegetables (non-starchy veg: green leafy vegetables, cucumbers, broccoli, cauliflower, green string beans)  Snack: 1 protein bar + non-starchy vegetables (no calorie-containing dips/condiments)  Beverages: at least 48-64 oz water between meals daily (Powerade Zero or Propel Zero or Crystal Light or Isael, less than 5 Calories per serving)    *Protein Shake Criteria: no more than 250 Calories, at least 20 grams of protein, and less than 10 grams of sugar     Frozen Meal Replacements  Healthy Choice  Lean Cuisine  Atkins Meals  Smart Ones    -Continue eating slowly (20-30 minutes per meal), chewing foods well (25 chews per  bite/applesauce consistency)    -Increase exercise as able.  (Pool therapy, walking, etc)    -Continue to work on quitting smoking.     Follow up with RD in one month    Velvet Mcallister RD, MARLA  If you need to schedule or reschedule with a dietitian please call [phone].          Directr Information     Directr gives you secure access to your electronic health record. If you see a primary care provider, you can also send messages to your care team and make appointments. If you have questions, please call your primary care clinic.  If you do not have a primary care provider, please call [phone] and they will assist you.      Directr is an electronic gateway that provides easy, online access to your medical records. With Directr, you can request a clinic appointment, read your test results, renew a prescription or communicate with your care team.     To access your existing account, please contact your [facility] or call [phone] for assistance.        Care EveryWhere ID     This is your Care EveryWhere ID. This could be used by other organizations to access your [place] medical records  [id number]        Equal Access to Services     KIERSTEN DRIVER : Hadjamal Woodward, walawanda campos, qafabiola shah, bola walker. So [facility] [phone].    ATENCIÓN: Si habla español, tiene a de la rosa disposición servicios gratuitos de asistencia lingüística. Zara al [phone].    We comply with applicable federal civil rights laws and [place] laws. We do not discriminate on the basis of race, color, national origin, age, disability, sex, sexual orientation, or gender identity.

## 2018-10-30 NOTE — MR AVS SNAPSHOT
After Visit Summary   10/30/2018    Pricilla Brown    MRN: [id number]           Patient Information     Date Of Birth          1975        Visit Information        Provider Department      10/30/2018 1:30 PM Lauren Resendez RPH [facility] Medication Therapy Management        Today's Diagnoses     Tobacco abuse    -  1       Follow-ups after your visit        Your next 10 appointments already scheduled     Nov 08, 2018  1:25 PM CST   (Arrive by 1:10 PM)   Return Visit with Jamilah Bernal MD   [facility] ([facility] [place])    [address] [address]   [phone]            Nov 15, 2018 10:30 AM CST   Return Visit with Norman Jean DPM   [facility] ([facility])    [address] [place] [address]   [phone]            Nov 26, 2018  1:00 PM CST   (Arrive by 12:45 PM)   Return Weight Management Visit with Velvet Mcallister RD   [facility] ([facility] [place])    [address] [address]   [phone]            Nov 28, 2018  1:00 PM CST   Pool Treatment with Génesis Wheeler, PT   [facility] ([facility])    [address]  [facility] [address]   [phone]            Nov 29, 2018  2:30 PM CST   Return Sleep Patient with YOCASTA Horan CNP   [facility] ([facility])    [address] [facility] [address]   [phone]            Nov 30, 2018  1:45 PM CST   Pool Treatment with Génesis Wheeler, PT   [facility] ([facility])    [address]  [facility] [address]   [phone]            Dec 03, 2018 12:30 PM CST   (Arrive by 12:15 PM)   Return Weight Management Visit with SHANE Murcia [facility]  Medical Weight Management ([facility])    [address] [facility] [address]  4th [address]  [facility] [address]   [phone]            Dec 07, 2018  3:15 PM CST   Pool Treatment with Chantal Chavis, PT   [facility] ([facility])    [address]  [address]  [place] [address]   [phone]            Dec 10, 2018  1:00 PM CST   Pool Treatment with Génesis Wheeler, PT   [facility] ([facility])    [address]  [address]  [place] [address]   [phone]            Dec 12, 2018  1:00 PM CST   Pool Treatment with Chantal Chavis, PT   [facility] ([facility])    [address]  [address]  [place] [address]   [phone]              Who to contact     If you have questions or need follow up information about today's clinic visit or your schedule please contact [facility] MEDICATION THERAPY MANAGEMENT directly at [phone].  Normal or non-critical lab and imaging results will be communicated to you by [employer], letter or phone within 4 business days after the clinic has received the results. If you do not hear from us within 7 days, please contact the clinic through [employer] or phone. If you have a critical or abnormal lab result, we will notify you by phone as soon as possible.  Submit refill requests through [employer] or call your pharmacy and they will forward the refill request to us. Please allow 3 business days for your refill to be completed.          Additional Information About Your Visit        [employer] Information     [employer] gives you secure access to your electronic health record. If you see a primary care provider, you can also send messages to your care team and make appointments. If you have questions, please call your primary care clinic.  If you do not have a primary care provider, please call [phone] and they will assist you.         Care EveryWhere ID     This is your Care EveryWhere ID. This could be used by other organizations to access your [facility] medical records  [id number]         Blood Pressure from Last 3 Encounters:   10/25/18 149/86   10/11/18 138/68   10/09/18 144/87    Weight from Last 3 Encounters:   10/30/18 (!) 474 lb 1.6 oz (215.1 kg)   10/25/18 (!) 466 lb (211.4 kg)   10/11/18 (!) 466 lb 4.8 oz (211.5 kg)              Today, you had the following     No orders found for display         Today's Medication Changes          These changes are accurate as of 10/30/18 11:59 PM.  If you have any questions, ask your nurse or doctor.               These medicines have changed or have updated prescriptions.        Dose/Directions    * nicotine 14 MG/24HR 24 hr patch   Commonly known as:  [facility] NICOTINE   This may have changed:  Another medication with the same name was added. Make sure you understand how and when to take each.   Used for:  Tobacco abuse   Changed by:  Lauren Resendez RPH        Dose:  1 patch   Place 1 patch onto the skin every 24 hours   Quantity:  30 patch   Refills:  1       * nicotine 10 MG Inhaler   Commonly known as:  NICOTROL   This may have changed:  You were already taking a medication with the same name, and this prescription was added. Make sure you understand how and when to take each.   Used for:  Tobacco abuse   Changed by:  Lauren Resendez RPH        Inhale short puffs over 5-15 minutes every 1-2 hours as directed.   Quantity:  1 Box   Refills:  4       * nicotine 14 MG/24HR 24 hr patch   Commonly known as:  NICODERM CQ   This may have changed:  You were already taking a medication with the same name, and this prescription was added. Make sure you understand how and when to take each.   Used for:  Tobacco abuse   Changed by:  Lauren Resendez RPH        Dose:  1 patch   Place 1 patch onto the skin every 24 hours   Quantity:  90 patch   Refills:  2       * Notice:  This list has 3  medication(s) that are the same as other medications prescribed for you. Read the directions carefully, and ask your doctor or other care provider to review them with you.         Where to get your medicines      These medications were sent to [facility] - [place], [place] - [address]  [address], [facility] [address]    Hours:  TRANSPLANT PHONE NUMBER [phone] Phone:  [phone]     nicotine 10 MG Inhaler    nicotine 14 MG/24HR 24 hr patch                Primary Care Provider Office Phone # Fax #    Jamilah Bernal [phone]       [address] [facility] [address]        Equal Access to Services     KIERSTEN DRIVER : Maryam Woodward, wagermaniada blaneadaha, qaybta kaalmada adesulaimanyada, bola walker. So [facility] [phone].    ATENCIÓN: Si habla español, tiene a de la rosa disposición servicios gratuitos de asistencia lingüística. Llame al [phone].    We comply with applicable federal civil rights laws and [place] laws. We do not discriminate on the basis of race, color, national origin, age, disability, sex, sexual orientation, or gender identity.            Thank you!     Thank you for choosing [facility] MEDICATION THERAPY MANAGEMENT  for your care. Our goal is always to provide you with excellent care. Hearing back from our patients is one way we can continue to improve our services. Please take a few minutes to complete the written survey that you may receive in the mail after your visit with us. Thank you!             Your Updated Medication List - Protect others around you: Learn how to safely use, store and throw away your medicines at www.disposemymeds.org.          This list is accurate as of 10/30/18 11:59 PM.  Always use your most recent med list.                   Brand Name Dispense Instructions for use Diagnosis    acetaminophen 325 MG tablet    TYLENOL    180 tablet    Take 2  tablets (650 mg) by mouth 3 times daily as needed for mild pain or fever (total acetaminophen dose should not exceed 3000 mg per day)    Neuropathic pain of lower extremity, unspecified laterality       albuterol 108 (90 Base) MCG/ACT inhaler    PROAIR HFA/PROVENTIL HFA/VENTOLIN HFA    1 Inhaler    Inhale 2 puffs into the lungs every 6 hours as needed    Mild intermittent asthma without complication       bisacodyl 10 MG Suppository    DULCOLAX    6 suppository    Place 1 suppository (10 mg) rectally daily as needed for constipation    Constipation       blood glucose lancets standard    no brand specified    100 each    USE TO CHECK  blood sugar fasting each am  prelunch and predinner daily OR AS DIRECTED Call clinic to schedule MD APPT.    Diabetes mellitus, type 2 (H)       blood glucose monitoring test strip    no brand specified    100 strip    Use to test blood sugars 3 times daily or as directed    Diabetes mellitus, type 2 (H)       buPROPion 100 MG 12 hr tablet    WELLBUTRIN SR    180 tablet    Take 1 tablet (100 mg) by mouth 2 times daily    Tobacco abuse       capsaicin 0.025 % Crea cream    ZOSTRIX    1 Tube    Apply 1 g topically 3 times daily as needed    Dermatophytosis of nail       ciclopirox 8 % Soln     1 Bottle    Externally apply topically daily To toenails.    Dermatophytosis of nail       * clotrimazole 1 % cream    LOTRIMIN    30 g    Apply topically 2 times daily    Tinea pedis of right foot       * clotrimazole 1 % cream    LOTRIMIN    60 g    Apply topically 2 times daily as needed (skin irritation)    Furuncle of abdominal wall       colchicine 0.6 MG tablet    COLCYRS    30 tablet    Take 1-2 tablets (0.6-1.2 mg) by mouth daily as needed for moderate pain *Call for appointment [phone]. Over 12 months since last seen.    Acute gouty arthritis       diclofenac 1 % Gel topical gel    VOLTAREN    100 g    Apply 4 grams to knees or 2 grams to hands four times daily using enclosed dosing  card.    Left foot pain       dulaglutide 1.5 MG/0.5ML pen    TRULICITY    45 mL    Inject 1.5 mg Subcutaneous every 7 days    Type 2 diabetes mellitus with hyperglycemia, with long-term current use of insulin (H)       DULoxetine 20 MG EC capsule    CYMBALTA    60 capsule    Take 1 capsule (20 mg) by mouth 2 times daily    Depression       Efinaconazole 10 % Soln     8 mL    Externally apply topically daily To toenails.    Dermatophytosis of nail       gabapentin 300 MG capsule    NEURONTIN    120 capsule    Take 2 capsules (600 mg) by mouth 2 times daily Needs PCP appt for further refills    Type 2 diabetes mellitus with hyperglycemia, with long-term current use of insulin (H)       hydrALAZINE 25 MG tablet    APRESOLINE    450 tablet    Take 50-75 mg by mouth Take 2 tabs (50 mg) in am, 2 tabs (50 mg) midday, and 3 tabs (75 mg) in pm daily    Dilated cardiomyopathy (H)       * iBG star w/Device Kit     1 kit    -PLEASE GIVE PATIENT A DEVICE HER INSURANCE WILL COVER-    Type 2 diabetes mellitus with hyperglycemia, with long-term current use of insulin (H)       * blood glucose monitoring meter device kit    no brand specified    1 kit    Use to test blood sugar 3 times daily or as directed.    Diabetes mellitus, type 2 (H)       insulin glargine U-300 300 UNIT/ML injection    TOUJEO    30 mL    Inject 170 units SQ each am.    Type 2 diabetes mellitus with hyperglycemia, with long-term current use of insulin (H)       insulin pen needle 32G X 4 MM    BD RIVER U/F    600 each    Use 6 daily or as directed    Diabetes mellitus, type 2 (H)       levothyroxine 200 MCG tablet    SYNTHROID/LEVOTHROID    102 tablet    Take 1 tab Monday thru Saturday and 2 tabs on Sundays.    Other specified hypothyroidism       metolazone 2.5 MG tablet    ZAROXOLYN    60 tablet    Take 1 tablet (2.5 mg) by mouth 2 times daily Take 1/2 hour prior to torsemide    Acute on chronic systolic heart failure (H)       metoprolol succinate 100 MG 24  hr tablet    TOPROL-XL    90 tablet    Take 1 tablet (100 mg) by mouth daily    Chronic diastolic heart failure (H)       morphine 15 MG 12 hr tablet    MS CONTIN     Take 15 mg by mouth daily as needed        Nebulizer Compressor Kit     1 kit    1 Device 4 times daily as needed.    COPD (chronic obstructive pulmonary disease) (H)       * nicotine 14 MG/24HR 24 hr patch    [facility] NICOTINE    30 patch    Place 1 patch onto the skin every 24 hours    Tobacco abuse       * nicotine 10 MG Inhaler    NICOTROL    1 Box    Inhale short puffs over 5-15 minutes every 1-2 hours as directed.    Tobacco abuse       * nicotine 14 MG/24HR 24 hr patch    NICODERM CQ    90 patch    Place 1 patch onto the skin every 24 hours    Tobacco abuse       NovoLOG FLEXPEN 100 UNIT/ML injection   Generic drug:  insulin aspart     180 mL    Inject 70 units with meals plus correction. Pt uses approx 200 units in 24 hrs.    Type 2 diabetes mellitus with hyperglycemia, with long-term current use of insulin (H)       nystatin cream    MYCOSTATIN    90 g    Apply topically 2 times daily To toenails    Dermatophytosis of nail       order for DME     1 each    Use your CPAP device as directed by your provider. Pressure change to min 13 max 18cwp        * order for DME     1 each    Equipment being ordered: Challenger Wide walker if available - patient needs seat, basket and brakes.    Dilated cardiomyopathy (H), Chronic systolic heart failure (H)       * order for DME     1 each    Equipment being ordered: BI 8161-2018 $92  Plantar, fasciitis, LG, night splint    Dermatophytosis of nail, Plantar fasciitis of right foot, Diabetic neuropathy with neurologic complication (H), Peroneal tendinitis, right       * order for DME     1 Units    Left foot    Left foot pain, Diabetic neuropathy with neurologic complication (H)       oxyCODONE-acetaminophen 5-325 MG per tablet    PERCOCET    60 tablet    Take 1 tablet by mouth every 8 hours as needed for  moderate to severe pain (try to limit use, no further prescriptions until seen in pain clinic)    Lumbago, Bilateral low back pain with sciatica, sciatica laterality unspecified       polyethylene glycol powder    MIRALAX/GLYCOLAX          potassium chloride SA 20 MEQ CR tablet    K-DUR/KLOR-CON M    240 tablet    TAKE TWO TABLETS BY MOUTH FOUR TIMES A DAY    Hypokalemia       senna 8.6 MG tablet    SENOKOT    45 tablet    Take 1 tablet by mouth 2 times daily    Constipation       spironolactone 50 MG tablet    ALDACTONE    30 tablet    Take 1 tablet (50 mg) by mouth daily        tiotropium 18 MCG capsule    SPIRIVA HANDIHALER    30 capsule    Inhale contents of one capsule daily.    COPD (chronic obstructive pulmonary disease) (H)       topiramate 50 MG tablet    TOPAMAX    270 tablet    Take 2 tablets (100 mg) by mouth daily Take 100 mg for 1 month and increase to 150 mg thereafter    Morbid obesity (H), Type 2 diabetes mellitus with diabetic polyneuropathy, without long-term current use of insulin (H)       torsemide 100 MG tablet    DEMADEX    180 tablet    Take 1 tablet (100 mg) by mouth 2 times daily    Chronic diastolic heart failure (H)       * warfarin 5 MG tablet    COUMADIN    90 tablet    Take one to two tablets daily or as directed by the Coumadin Clinic    Atrial fibrillation, unspecified type (H), Long-term (current) use of anticoagulants       * warfarin 5 MG tablet    COUMADIN    170 tablet    [id number] on MTuThSatSun, and 20mg on WF, or as directed by the Medication Monitoring Clinic at the [facility].    Atrial fibrillation, unspecified type (H)       * Notice:  This list has 12 medication(s) that are the same as other medications prescribed for you. Read the directions carefully, and ask your doctor or other care provider to review them with you.

## 2018-10-30 NOTE — MR AVS SNAPSHOT
Pricilla Brown   10/30/2018   Anticoagulation Therapy Visit    Description:  43 year old female   Provider:  Velvet Khan, RN   Department:  [facility]           INR as of 10/30/2018     Today's INR 2.13      Anticoagulation Summary as of 10/30/2018     INR goal 2.0-2.5   Today's INR 2.13   Full warfarin instructions 20 mg on Fri; 15 mg all other days   Next INR check 11/6/2018    Indications   Atrial fibrillation (H) [I48.91] [I48.91]  Long-term (current) use of anticoagulants [Z79.01] [Z79.01]         October 2018 Details    Sun Mon Tue Wed Thu Fri Sat      1               2               3               4               5               6                 7               8               9               10               11               12               13                 14               15               16               17               18               19               20                 21               22               23               24               25               26               27                 28               29               30      15 mg   See details      31      15 mg             Date Details   10/30 This INR check               How to take your warfarin dose     To take:  15 mg Take 3 of the 5 mg tablets.           November 2018 Details    Sun Mon Tue Wed Thu Fri Sat         1      15 mg         2      20 mg         3      15 mg           4      15 mg         5      15 mg         6            7               8               9               10                 11               12               13               14               15               16               17                 18               19               20               21               22               23               24                 25               26               27               28               29               30                 Date Details   No additional details    Date of next INR:  11/6/2018         How  to take your warfarin dose     To take:  15 mg Take 3 of the 5 mg tablets.    To take:  20 mg Take 4 of the 5 mg tablets.

## 2018-10-30 NOTE — PROGRESS NOTES
ANTICOAGULATION FOLLOW-UP CLINIC VISIT    Patient Name:  Pricilla Brown  Date:  10/30/2018  Contact Type:  Telephone    SUBJECTIVE:        OBJECTIVE    INR   Date Value Ref Range Status   10/30/2018 2.13 (H) 0.86 - 1.14 Final     Chromogenic Factor 10   Date Value Ref Range Status   04/08/2017 19 (L) 70 - 130 % Final     Comment:     Therapeutic Range:  A Chromogenic Factor 10 level of approximately 20-40%   inversely correlates with an INR of 2-3 for patients receiving Warfarin.   Chromogenic Factor 10 levels below 20% indicate an INR greater than 3 and   levels above 40% indicate an INR less than 2.         ASSESSMENT / PLAN  INR assessment THER    Recheck INR In: 1 WEEK    INR Location Clinic      Anticoagulation Summary as of 10/30/2018     INR goal 2.0-2.5   Today's INR 2.13   Warfarin maintenance plan 20 mg (5 mg x 4) on Fri; 15 mg (5 mg x 3) all other days   Full warfarin instructions 20 mg on Fri; 15 mg all other days   Weekly warfarin total 110 mg   No change documented Velvet Khan RN   Plan last modified Alecia Jacobson RN (10/24/2018)   Next INR check 11/6/2018   Priority INR   Target end date Indefinite    Indications   Atrial fibrillation (H) [I48.91] [I48.91]  Long-term (current) use of anticoagulants [Z79.01] [Z79.01]         Anticoagulation Episode Summary     INR check location Clinic Lab    Preferred lab     Send INR reminders to [facility]    Comments Contact Patient at [phone]      Anticoagulation Care Providers     Provider Role Specialty Phone number    Percy Alcala MD  Cardiology [phone]            See the Encounter Report to view Anticoagulation Flowsheet and Dosing Calendar (Go to Encounters tab in chart review, and find the Anticoagulation Therapy Visit)    Left message for patient with results and dosing recommendations. Asked patient to call back to report any missed doses, falls, signs and symptoms of bleeding or clotting, any changes in health,  medication, or diet. Asked patient to call back with any questions or concerns.       Velvet Khan RN

## 2018-10-30 NOTE — PROGRESS NOTES
Clinical Consult:                                                    Pricilla Brown is a 43 year old female coming in for a clinical pharmacist consult.  She was referred to me from Dr. Bernal.     Reason for Consult: Nicotine inhaler teaching    Discussion: Patient would like to learn how to use the nicotine inhaler.  Currently, she is smoking 10 CPD.  Down to 2-3 cigarettes daily in the recent past with just cutting back, no NRT.  She had successfully quit while in the hospital a few years ago but went right back to smoking after discharge. The longest she has ever quit for was 2-3 months after her ddx with AFib in 2014.  She was very fearful of her health. She was on the 7 mg/24 hr patch at that time.  The nicotine inhaler was recommended to her in addition to the patch to help her quit again. Nicotine inhaler instruction is provided.     Plan:  1. Use nicotine inhaler in short puffs over 5-15 minutes every 1-2 hours as needed for cravings along with the nicotine patch.       Lauren Resendez, Pharm.D., BCACP  Medication Therapy Management Pharmacist  Page/VM:  [phone]

## 2018-10-30 NOTE — PROGRESS NOTES
"  Bariatric Nutrition Consultation Note    Reason For Visit: Nutrition Reassessment    Pricilla Brown is a 43 year-old (type 2 DM on insulin sliding scale) presenting today for bariatric nutrition consult.  Pt is interested in laparoscopic sleeve gastrectomy.  Pt has met all required RD visits prior to surgery.  Pt was referred by NANETTE Kerr and Dr. Silva Damon.    Patient signed ABN 9/20/18-9/20/19.    Coordination Notes:,   5/29/18: In light of it being several months before anticipating surgery, writer will revisit task list when Pt is closer to weight loss goal.  Writer will continue to encourage Pt to come at least monthly to see RD.    ANTHROPOMETRICS:  Ht: 67\"  Initial Wt: 462.6 lbs with BMI of 72.6  Current Wt: 474.1 lbs (+19.5 lbs over the past month; +11.5 lbs from initial weight)     Required weight loss goal pre-op: -46 lbs from initial consult weight (goal weight 416.6 lbs or less before surgery)    NUTRITION HISTORY:  Food Allergies/Intolerances: none known  Cravings: sweets, chips, pop (diet)  Triggers/cues causing extra eating: boredom (currently on disability, not working)  *Pt is on topiramate per Dr. Silva Damon who would like Pt to re-try modified liquid diet with 2 shakes/day.    Recent food recall:  Protein shake in the morning  Lunch- shredded beef and grits(butter)  Dinner- beef stew(canned)   Apple and granola bar  Beverages- coffee, tea with splenda    Progress with Previous Goals:  Relating To Eating:  -Follow the Modified Liquid Diet for weight loss:  continues  Breakfast: Protein Shake (160-250 Jagjit, 20-35 g protein)  Lunch: 3 oz lean protein (chicken/turkey breast or fish)  + non-starchy vegetables (non-starchy veg: green leafy vegetables, cucumbers, broccoli, cauliflower, green string beans)  Supper: 3 oz lean protein (chicken/turkey breast or fish)  + non-starchy vegetables (non-starchy veg: green leafy vegetables, cucumbers, broccoli, cauliflower, green " string beans)  Snack: 1 protein bar + non-starchy vegetables (no calorie-containing dips/condiments)  Beverages: at least 48-64 oz water between meals daily (Powerade Zero or Propel Zero or Crystal Light or Isael, less than 5 Calories per serving)    *Protein Shake Criteria: no more than 250 Calories, at least 20 grams of protein, and less than 10 grams of sugar     Frozen Meal Replacements  Healthy Choice  Lean Cuisine  Atkins Meals  Smart Ones    -Continue eating slowly (20-30 minutes per meal), chewing foods well (25 chews per bite/applesauce consistency) continues    -Increase exercise as able.  (Pool therapy, walking, etc) start pool therapy next month, walking daily ~1 block    -Continue to work on quitting smoking. 10 per day    NUTRITION DIAGNOSIS:  Obesity r/t long history of self-monitoring deficit and excessive energy intake aeb BMI >30.- continues    INTERVENTION:  Intervention Provided/Education Provided:  Patient reported that she tried two shakes per day and felt like she got so hungry that she over ate in the evening.  She stated that she has not tried two frozen meals and the protein shake, encouraged starting with this and if unable to maintain this diet to try a protein shake in the morning eat lunch and use a meal replacement again in the evening.  Gave encouragement and support.  Provided Pt with list of goals and task list.      Note: Pt stated she did not purchase clinic pharmaceutical grade meal replacements due to financial challenges.   10/30/18- patient was interested in review price information with her significant other at home.    Patient Understanding: good  Expected Compliance: good      GOALS:  Relating To Eating:  -Follow the Modified Liquid Diet for weight loss:    Breakfast: Protein Shake (160-250 Jagjit, 20-35 g protein)  Lunch: 3 oz lean protein (chicken/turkey breast or fish)  + non-starchy vegetables (non-starchy veg: green leafy vegetables, cucumbers, broccoli, cauliflower, green  string beans)  Supper: 3 oz lean protein (chicken/turkey breast or fish)  + non-starchy vegetables (non-starchy veg: green leafy vegetables, cucumbers, broccoli, cauliflower, green string beans)  Snack: 1 protein bar + non-starchy vegetables (no calorie-containing dips/condiments)  Beverages: at least 48-64 oz water between meals daily (Powerade Zero or Propel Zero or Crystal Light or [place], less than 5 Calories per serving)    *Protein Shake Criteria: no more than 250 Calories, at least 20 grams of protein, and less than 10 grams of sugar     Frozen Meal Replacements  Healthy Choice  Lean Cuisine  Atkins Meals  Smart Ones    -Continue eating slowly (20-30 minutes per meal), chewing foods well (25 chews per bite/applesauce consistency)    -Increase exercise as able.  (Pool therapy, walking, etc)    -Continue to work on quitting smoking.       Follow-Up: follow-up with RD in 1 month.      Time spent with patient: 15 minutes     Velvet Mcallister RD, LD

## 2018-11-26 ENCOUNTER — ANTICOAGULATION THERAPY VISIT (OUTPATIENT)
Dept: ANTICOAGULATION | Facility: CLINIC | Age: 43
End: 2018-11-26

## 2018-11-26 DIAGNOSIS — I48.91 ATRIAL FIBRILLATION, UNSPECIFIED TYPE (H): ICD-10-CM

## 2018-11-27 ENCOUNTER — OFFICE VISIT (OUTPATIENT)
Dept: PODIATRY | Facility: CLINIC | Age: 43
End: 2018-11-27
Payer: MEDICARE

## 2018-11-27 ENCOUNTER — TELEPHONE (OUTPATIENT)
Dept: PODIATRY | Facility: CLINIC | Age: 43
End: 2018-11-27

## 2018-11-27 VITALS
RESPIRATION RATE: 18 BRPM | TEMPERATURE: 98.6 F | OXYGEN SATURATION: 100 % | HEART RATE: 84 BPM | SYSTOLIC BLOOD PRESSURE: 143 MMHG | DIASTOLIC BLOOD PRESSURE: 85 MMHG

## 2018-11-27 DIAGNOSIS — M76.72 PERONEAL TENDINITIS OF BOTH LOWER LEGS: ICD-10-CM

## 2018-11-27 DIAGNOSIS — M76.61 ACHILLES TENDINITIS OF BOTH LOWER EXTREMITIES: ICD-10-CM

## 2018-11-27 DIAGNOSIS — E11.49 TYPE II OR UNSPECIFIED TYPE DIABETES MELLITUS WITH NEUROLOGICAL MANIFESTATIONS, NOT STATED AS UNCONTROLLED(250.60) (H): Primary | ICD-10-CM

## 2018-11-27 DIAGNOSIS — E11.69 TYPE 2 DIABETES MELLITUS WITH DIABETIC FOOT DEFORMITY (H): ICD-10-CM

## 2018-11-27 DIAGNOSIS — M67.979 TIBIALIS POSTERIOR TENDINOPATHY: ICD-10-CM

## 2018-11-27 DIAGNOSIS — M76.71 PERONEAL TENDINITIS OF BOTH LOWER LEGS: ICD-10-CM

## 2018-11-27 DIAGNOSIS — M76.62 ACHILLES TENDINITIS OF BOTH LOWER EXTREMITIES: ICD-10-CM

## 2018-11-27 DIAGNOSIS — M21.969 TYPE 2 DIABETES MELLITUS WITH DIABETIC FOOT DEFORMITY (H): ICD-10-CM

## 2018-11-27 DIAGNOSIS — E11.51 DIABETES MELLITUS WITH PERIPHERAL VASCULAR DISEASE (H): ICD-10-CM

## 2018-11-27 DIAGNOSIS — M72.2 PLANTAR FASCIITIS, BILATERAL: ICD-10-CM

## 2018-11-27 DIAGNOSIS — B35.3 TINEA PEDIS OF BOTH FEET: ICD-10-CM

## 2018-11-27 DIAGNOSIS — B35.1 ONYCHOMYCOSIS: ICD-10-CM

## 2018-11-27 PROCEDURE — 99214 OFFICE O/P EST MOD 30 MIN: CPT | Performed by: PODIATRIST

## 2018-11-27 RX ORDER — CICLOPIROX OLAMINE 7.7 MG/G
CREAM TOPICAL 2 TIMES DAILY
Qty: 90 G | Refills: 6 | Status: SHIPPED | OUTPATIENT
Start: 2018-11-27 | End: 2018-12-30

## 2018-11-27 ASSESSMENT — PAIN SCALES - GENERAL: PAINLEVEL: EXTREME PAIN (9)

## 2018-11-27 NOTE — MR AVS SNAPSHOT
After Visit Summary   11/27/2018    Pricilla Brown    MRN: [id number]           Patient Information     Date Of Birth          1975        Visit Information        Provider Department      11/27/2018 8:00 AM Norman Jean DPM [facility]        Today's Diagnoses     Type II or unspecified type diabetes mellitus with neurological manifestations, not stated as uncontrolled(250.60) (H)    -  1    Onychomycosis        Tinea pedis of both feet        Peroneal tendinitis of both lower legs        Tibialis posterior tendinopathy        Plantar fasciitis, bilateral        Achilles tendinitis of both lower extremities        Diabetes mellitus with peripheral vascular disease (H)        Type 2 diabetes mellitus with diabetic foot deformity (H)           Follow-ups after your visit        Additional Services     ORTHOTICS REFERRAL       *This referral order prints off in the [facility]  (Orthotics & Prosthetics) Central Scheduling Office**    The [facility] Central Scheduling Staff will contact the patient to schedule appointments.     Central Scheduling Contact Information: [phone] ([place])    Diabetic shoes.  Custom Diabetic foot orthotics, 3 pair.    Please be aware that coverage of these services is subject to the terms and limitations of your health insurance plan.  Call member services at your health plan with any benefit or coverage questions.      Please bring the following to your appointment:    >>   Any x-rays, CTs or MRIs which have been performed.  Contact the facility where they were done to arrange for  prior to your scheduled appointment.    >>   List of current medications   >>   This referral request   >>   Any documents/labs given to you for this referral            PHYSICAL THERAPY REFERRAL (Internal)       Physical Therapy Referral                  Your next 10 appointments already scheduled     Nov 29, 2018  2:30 PM CST   Return  Sleep Patient with YOCASTA Horan CNP   [facility] ([facility])    [address] [place] [address]   [phone]            Nov 30, 2018  9:30 AM CST   (Arrive by 9:15 AM)   Return Weight Management Visit with YARY Crawley [facility] Surgical Weight Management ([facility])    [address] [facility] [address]   [phone]            Dec 03, 2018 12:30 PM CST   (Arrive by 12:15 PM)   Return Weight Management Visit with SHANE Murcia [facility] Medical Weight Management ([facility])    [address] [facility] [address]   [phone]            Dec 07, 2018  3:15 PM CST   Pool Treatment with Chantal Chavis, PT   [facility] ([facility])    [address]  [address]  [facility] [address]   [phone]            Dec 12, 2018  1:00 PM CST   Pool Treatment with Chantal Chavis, PT   [facility] ([facility])    [address]  [address]  [facility] [address]   [phone]            Dec 14, 2018 11:30 AM CST   (Arrive by 11:15 AM)   RETURN DIABETES with SHANE Rojas [facility] Endocrinology ([facility])    [address] [facility] [address]   [phone]            Dec 17, 2018 11:30 AM CST   Lab with  LAB    Health Lab ([facility])    [address]  1st Floor  [facility] [address]   [phone]            Dec 17, 2018 12:00 PM CST   (Arrive by 11:45 AM)   CORE RETURN with ALEXANDER Osorio [facility] Heart Care ([facility])    [address] [place] [address]  [address] [place] [address] [phone]            Dec 19, 2018  1:00 PM CST   Pool Treatment with Chantal  Earline Chavis, PT   [facility] ([facility])    [address] [place] [address]  [address]  [place] [address]   [phone]            Dec 21, 2018  1:45 PM CST   Pool Treatment with Chantal Earline Chavis, PT   [facility] ([facility])    [address] [place] [address]  [address]  [place] [address]   [phone]              Future tests that were ordered for you today     Open Future Orders        Priority Expected Expires Ordered    PHYSICAL THERAPY REFERRAL (Internal) Routine  11/27/2019 11/27/2018            Who to contact     If you have questions or need follow up information about today's clinic visit or your schedule please contact [facility] directly at [phone].  Normal or non-critical lab and imaging results will be communicated to you by Prysmhart, letter or phone within 4 business days after the clinic has received the results. If you do not hear from us within 7 days, please contact the clinic through Prysmhart or phone. If you have a critical or abnormal lab result, we will notify you by phone as soon as possible.  Submit refill requests through DataKraft or call your pharmacy and they will forward the refill request to us. Please allow 3 business days for your refill to be completed.          Additional Information About Your Visit        Prysmhart Information     DataKraft gives you secure access to your electronic health record. If you see a primary care provider, you can also send messages to your care team and make appointments. If you have questions, please call your primary care clinic.  If you do not have a primary care provider, please call [phone] and they will assist you.      DataKraft is an electronic gateway that provides easy, online access to your medical records. With DataKraft, you can request a clinic appointment, read your test results, renew a prescription or communicate with your care team.     To  access your existing account, please contact your [facility] or call [phone] for assistance.        Care EveryWhere ID     This is your Care EveryWhere ID. This could be used by other organizations to access your [facility] medical records  [id number]        Your Vitals Were     Pulse Temperature Respirations Pulse Oximetry          84 98.6  F (37  C) (Oral) 18 100%         Blood Pressure from Last 3 Encounters:   11/27/18 143/85   10/25/18 149/86   10/11/18 138/68    Weight from Last 3 Encounters:   10/30/18 (!) 215.1 kg (474 lb 1.6 oz)   10/25/18 (!) 211.4 kg (466 lb)   10/11/18 (!) 211.5 kg (466 lb 4.8 oz)              We Performed the Following     DEBRIDEMENT OF NAILS, 6 OR MORE     ORTHOTICS REFERRAL          Today's Medication Changes          These changes are accurate as of 11/27/18  8:45 AM.  If you have any questions, ask your nurse or doctor.               Start taking these medicines.        Dose/Directions    ciclopirox 0.77 % cream   Commonly known as:  LOPROX   Used for:  Onychomycosis, Tinea pedis of both feet   Started by:  Norman Jean DPM        Apply topically 2 times daily To feet and toenails.   Quantity:  90 g   Refills:  6            Where to get your medicines      These medications were sent to [facility] [place] [id number]    Hours:  TRANSPLANT PHONE NUMBER [phone] Phone:  [phone]     ciclopirox 0.77 % cream                Primary Care Provider Office Phone # Fax #    Jamilah Bernal [phone]       [address] [place] [address]        Equal Access to Services     KIERSTEN DRIVER AH: Maryam Woodward, malinda campos, ellen montalvoaldilip shah, bola walker. So [facility] [phone].    ATENCIÓN: Si habla español, tiene a de la rosa disposición servicios gratuitos de  asistencia lingüística. Zara al [phone].    We comply with applicable federal civil rights laws and [place] laws. We do not discriminate on the basis of race, color, national origin, age, disability, sex, sexual orientation, or gender identity.            Thank you!     Thank you for choosing [facility]  for your care. Our goal is always to provide you with excellent care. Hearing back from our patients is one way we can continue to improve our services. Please take a few minutes to complete the written survey that you may receive in the mail after your visit with us. Thank you!             Your Updated Medication List - Protect others around you: Learn how to safely use, store and throw away your medicines at www.disposemymeds.org.          This list is accurate as of 11/27/18  8:45 AM.  Always use your most recent med list.                   Brand Name Dispense Instructions for use Diagnosis    acetaminophen 325 MG tablet    TYLENOL    180 tablet    Take 2 tablets (650 mg) by mouth 3 times daily as needed for mild pain or fever (total acetaminophen dose should not exceed 3000 mg per day)    Neuropathic pain of lower extremity, unspecified laterality       albuterol 108 (90 Base) MCG/ACT inhaler    PROAIR HFA/PROVENTIL HFA/VENTOLIN HFA    1 Inhaler    Inhale 2 puffs into the lungs every 6 hours as needed    Mild intermittent asthma without complication       bisacodyl 10 MG Suppository    DULCOLAX    6 suppository    Place 1 suppository (10 mg) rectally daily as needed for constipation    Constipation       blood glucose lancets standard    no brand specified    100 each    USE TO CHECK  blood sugar fasting each am  prelunch and predinner daily OR AS DIRECTED Call clinic to schedule MD APPT.    Diabetes mellitus, type 2 (H)       * blood glucose monitoring device kit     1 kit    -PLEASE GIVE PATIENT A DEVICE HER INSURANCE WILL COVER-    Type 2 diabetes mellitus with hyperglycemia, with  long-term current use of insulin (H)       * blood glucose monitoring meter device kit    no brand specified    1 kit    Use to test blood sugar 3 times daily or as directed.    Diabetes mellitus, type 2 (H)       blood glucose monitoring test strip    no brand specified    100 strip    Use to test blood sugars 3 times daily or as directed    Diabetes mellitus, type 2 (H)       buPROPion 100 MG 12 hr tablet    WELLBUTRIN SR    180 tablet    Take 1 tablet (100 mg) by mouth 2 times daily    Tobacco abuse       capsaicin 0.025 % Crea cream    ZOSTRIX    1 Tube    Apply 1 g topically 3 times daily as needed    Dermatophytosis of nail       ciclopirox 0.77 % cream    LOPROX    90 g    Apply topically 2 times daily To feet and toenails.    Onychomycosis, Tinea pedis of both feet       ciclopirox 8 % solution    PENLAC    1 Bottle    Externally apply topically daily To toenails.    Dermatophytosis of nail       * clotrimazole 1 % cream    LOTRIMIN    30 g    Apply topically 2 times daily    Tinea pedis of right foot       * clotrimazole 1 % cream    LOTRIMIN    60 g    Apply topically 2 times daily as needed (skin irritation)    Furuncle of abdominal wall       colchicine 0.6 MG tablet    COLCYRS    30 tablet    Take 1-2 tablets (0.6-1.2 mg) by mouth daily as needed for moderate pain *Call for appointment [phone]. Over 12 months since last seen.    Acute gouty arthritis       diclofenac 1 % topical gel    VOLTAREN    100 g    Apply 4 grams to knees or 2 grams to hands four times daily using enclosed dosing card.    Left foot pain       dulaglutide 1.5 MG/0.5ML pen    TRULICITY    45 mL    Inject 1.5 mg Subcutaneous every 7 days    Type 2 diabetes mellitus with hyperglycemia, with long-term current use of insulin (H)       DULoxetine 20 MG capsule    CYMBALTA    60 capsule    Take 1 capsule (20 mg) by mouth 2 times daily    Depression       Efinaconazole 10 % Soln     8 mL    Externally apply topically daily To  toenails.    Dermatophytosis of nail       gabapentin 300 MG capsule    NEURONTIN    120 capsule    Take 2 capsules (600 mg) by mouth 2 times daily Needs PCP appt for further refills    Type 2 diabetes mellitus with hyperglycemia, with long-term current use of insulin (H)       hydrALAZINE 25 MG tablet    APRESOLINE    450 tablet    Take 50-75 mg by mouth Take 2 tabs (50 mg) in am, 2 tabs (50 mg) midday, and 3 tabs (75 mg) in pm daily    Dilated cardiomyopathy (H)       insulin glargine U-300 300 UNIT/ML injection    TOUJEO    30 mL    Inject 170 units SQ each am.    Type 2 diabetes mellitus with hyperglycemia, with long-term current use of insulin (H)       insulin pen needle 32G X 4 MM miscellaneous    BD RIVER U/F    600 each    Use 6 daily or as directed    Diabetes mellitus, type 2 (H)       levothyroxine 200 MCG tablet    SYNTHROID/LEVOTHROID    102 tablet    Take 1 tab Monday thru Saturday and 2 tabs on Sundays.    Other specified hypothyroidism       metolazone 2.5 MG tablet    ZAROXOLYN    60 tablet    Take 1 tablet (2.5 mg) by mouth 2 times daily Take 1/2 hour prior to torsemide    Acute on chronic systolic heart failure (H)       metoprolol succinate 100 MG 24 hr tablet    TOPROL-XL    90 tablet    Take 1 tablet (100 mg) by mouth daily    Chronic diastolic heart failure (H)       morphine 15 MG CR tablet    MS CONTIN     Take 15 mg by mouth daily as needed        Nebulizer Compressor Kit     1 kit    1 Device 4 times daily as needed.    COPD (chronic obstructive pulmonary disease) (H)       * nicotine 14 MG/24HR 24 hr patch    [employer] NICOTINE    30 patch    Place 1 patch onto the skin every 24 hours    Tobacco abuse       * nicotine 10 MG inhaler    NICOTROL    1 Box    Inhale short puffs over 5-15 minutes every 1-2 hours as directed.    Tobacco abuse       * nicotine 14 MG/24HR 24 hr patch    NICODERM CQ    90 patch    Place 1 patch onto the skin every 24 hours    Tobacco abuse       NovoLOG FLEXPEN 100  UNIT/ML pen   Generic drug:  insulin aspart     180 mL    Inject 70 units with meals plus correction. Pt uses approx 200 units in 24 hrs.    Type 2 diabetes mellitus with hyperglycemia, with long-term current use of insulin (H)       nystatin cream    MYCOSTATIN    90 g    Apply topically 2 times daily To toenails    Dermatophytosis of nail       order for DME     1 each    Use your CPAP device as directed by your provider. Pressure change to min 13 max 18cwp        * order for DME     1 each    Equipment being ordered: Challenger Wide walker if available - patient needs seat, basket and brakes.    Dilated cardiomyopathy (H), Chronic systolic heart failure (H)       * order for DME     1 each    Equipment being ordered: BI [id number] $92  Plantar, fasciitis, LG, night splint    Dermatophytosis of nail, Plantar fasciitis of right foot, Diabetic neuropathy with neurologic complication (H), Peroneal tendinitis, right       * order for DME     1 Units    Left foot    Left foot pain, Diabetic neuropathy with neurologic complication (H)       oxyCODONE-acetaminophen 5-325 MG tablet    PERCOCET    60 tablet    Take 1 tablet by mouth every 8 hours as needed for moderate to severe pain (try to limit use, no further prescriptions until seen in pain clinic)    Lumbago, Bilateral low back pain with sciatica, sciatica laterality unspecified       polyethylene glycol powder    MIRALAX/GLYCOLAX          potassium chloride SA 20 MEQ CR tablet    K-DUR/KLOR-CON M    240 tablet    TAKE TWO TABLETS BY MOUTH FOUR TIMES A DAY    Hypokalemia       senna 8.6 MG tablet    SENOKOT    45 tablet    Take 1 tablet by mouth 2 times daily    Constipation       spironolactone 50 MG tablet    ALDACTONE    30 tablet    Take 1 tablet (50 mg) by mouth daily        tiotropium 18 MCG inhaled capsule    SPIRIVA HANDIHALER    30 capsule    Inhale contents of one capsule daily.    COPD (chronic obstructive pulmonary disease) (H)       topiramate 50 MG  tablet    TOPAMAX    270 tablet    Take 2 tablets (100 mg) by mouth daily Take 100 mg for 1 month and increase to 150 mg thereafter    Morbid obesity (H), Type 2 diabetes mellitus with diabetic polyneuropathy, without long-term current use of insulin (H)       torsemide 100 MG tablet    DEMADEX    180 tablet    Take 1 tablet (100 mg) by mouth 2 times daily    Chronic diastolic heart failure (H)       * warfarin 5 MG tablet    COUMADIN    90 tablet    Take one to two tablets daily or as directed by the Coumadin Clinic    Atrial fibrillation, unspecified type (H), Long-term (current) use of anticoagulants       * warfarin 5 MG tablet    COUMADIN    170 tablet    [id number] on [id number], and 20mg on WF, or as directed by the [facility] at the [facility].    Atrial fibrillation, unspecified type (H)       * Notice:  This list has 12 medication(s) that are the same as other medications prescribed for you. Read the directions carefully, and ask your doctor or other care provider to review them with you.

## 2018-11-27 NOTE — LETTER
11/27/2018         RE: Pricilla Brown  [address]  [facility] [address]        Dear Colleague,    Thank you for referring your patient, Pricilla Brown, to the [facility]. Please see a copy of my visit note below.    Past Medical History:   Diagnosis Date     A-fib (H) 2011    on coumadin since 1/13     Asthma     as a kid     Chest pain 2/1/2017     Chronic anticoagulation for a-fib 2/15/2013    INR's followed by coumadin clinic at      Diabetes mellitus (H) 2012     Diastolic heart failure 2/15/2013     Dilated cardiomyopathy (H) 1/8/2013     HTN (hypertension)      Hyperthyroidism     Graves, s/p I131 1/13, now on prednisone and methimazole     Morbid obesity (H)      Pulmonary embolism (H) 1/12    hospitalized in [place], on lovenox/coumadin for a few months but stopped, hypercoag w/u neg per pt     Sleep apnea     using CPAP     Patient Active Problem List   Diagnosis     Chronic atrial fibrillation (HCC)     Dilated cardiomyopathy (H)     Morbid obesity (H)     Diabetes mellitus, type 2 (H)     Chronic diastolic heart failure (H)     Chronic anticoagulation for a-fib     JYOTI (obstructive sleep apnea) CPAP Min Pressure of 13 and Max Pressure of 18     Hypothyroidism following radioiodine therapy     SOB (shortness of breath)     Azotemia     Asthma     Peritonsillar abscess     Plantar fasciitis     Neuropathic pain of lower extremity     Atrial fibrillation (H) [I48.91]     Long-term (current) use of anticoagulants [Z79.01]     Cellulitis     Chest pain     Foot pain     Class 3 obesity due to excess calories with serious comorbidity and body mass index (BMI) greater than or equal to 70 in adult     No past surgical history on file.  Social History     Social History     Marital status: Single     Spouse name: N/A     Number of children: N/A     Years of education: N/A     Occupational History     Not on file.     Social History Main Topics     Smoking status: Light Tobacco Smoker      Packs/day: 0.25     Years: 13.00     Types: Cigarettes     Smokeless tobacco: Never Used      Comment: down to 5 cigs     Alcohol use No     Drug use: No     Sexual activity: Yes     Partners: Male     Birth control/ protection: Condom     Other Topics Concern     Not on file     Social History Narrative    Single, no children        Gyn:        Last pap several years ago, no abnormal    No STIs            Patient is single.  She is no longer working.  She used to work in the area of customer service.  She is currently living with her sister in [place], [place].  She has no pets.  Patient has smoked a half pack of cigarettes a day for the past 10 plus years.  She states that she is down to 5 cigarettes a day with the aid of Wellbutrin.  She does not smoke cigars, pipes or chew tobacco.  She has 1 cup of coffee in the morning.  She does not drink alcohol.  Patient does not exercise.      Family History   Problem Relation Age of Onset     Thyroid Disease Mother      Grave's D     Diabetes Mother      HEART DISEASE Mother      Cerebrovascular Disease Mother      dec. 56 yo     Hypertension Mother      HEART DISEASE Sister      Heart Murmur     Diabetes Sister      HEART DISEASE Father      dec in his 30s, MI     Psychotic Disorder Brother      Bipolar     Diabetes Brother      Thyroid Disease Brother      Hyper Thyroid     HEART DISEASE Brother      Thyroid Disease Sister      Hyper Thyroid     Thyroid Disease Sister      Hyper Thyroid     Thyroid Disease Sister      Mental Health Problems     Thyroid Disease Maternal Aunt      Thyroid Disease Maternal Uncle      Cancer No family hx of      Glaucoma No family hx of      Macular Degeneration No family hx of      Lab Results   Component Value Date    A1C 8.4 10/11/2018    Inr                                2.13          10/30/2018  Last Comprehensive Metabolic Panel:  Sodium   Date Value Ref Range Status   10/25/2018 132 (L) 133 - 144 mmol/L Final      Potassium   Date Value Ref Range Status   10/25/2018 4.1 3.4 - 5.3 mmol/L Final     Chloride   Date Value Ref Range Status   10/25/2018 98 94 - 109 mmol/L Final     Carbon Dioxide   Date Value Ref Range Status   10/25/2018 28 20 - 32 mmol/L Final     Anion Gap   Date Value Ref Range Status   10/25/2018 6 3 - 14 mmol/L Final     Glucose   Date Value Ref Range Status   10/25/2018 203 (H) 70 - 99 mg/dL Final     Urea Nitrogen   Date Value Ref Range Status   10/25/2018 17 7 - 30 mg/dL Final     Creatinine   Date Value Ref Range Status   10/25/2018 1.15 (H) 0.52 - 1.04 mg/dL Final     GFR Estimate   Date Value Ref Range Status   10/25/2018 51 (L) >60 mL/min/1.7m2 Final     Comment:     Non  GFR Calc     Calcium   Date Value Ref Range Status   10/25/2018 8.5 8.5 - 10.1 mg/dL Final       SUBJECTIVE FINDINGS:  43-year-old female presents for diabetic foot care and foot pain bilaterally.  She relates she needs new diabetic shoes and she needs her toenails cut.  She relates no injuries since we have seen her last.  No ulcers or sores.  She relates she does have numbness, tingling and neuropathy.  Relates her feet just hurt on the heel, back of the heel and mediolateral ankles.  Presents in her scooter chair today.        OBJECTIVE FINDINGS:  DP and PT are 2/4 bilaterally.  She has dystrophic, thick and brittle nails with subungual debris, dystrophy and discoloration 1-5 bilaterally to differing degrees.  She has hyperkeratotic tissue buildup plantar left heel.  She has a flexible flat foot on the left.  She has mild distally dorsally contracted digits, right greater than left bilaterally.  Deep tendon reflexes are intact bilaterally.  No gross tendon voids bilaterally.  Sharp/dull decreased with 5.07 [place] monofilament bilaterally.  No erythema, no drainage, no odor, no calor bilaterally.  She has pain on palpation along the peroneal tendon, the tibialis posterior tendon bilaterally, posterior  heel along the Achilles tendon bilaterally and plantar heel bilaterally.  No pain on range of motion bilaterally.  She has some mild dry, scaly skin in a moccasin pattern bilaterally, left greater than right foot.  She relates she has been using coconut oil on her feet intermittently.      ASSESSMENT/PLAN:  Diabetes with peripheral neuropathy.  Onychomycosis.  Tinea pedis changes.  She has a flexible flat foot on the left versus the right.  She has mild hammertoes present.  She has peroneal tendinopathy and tibialis posterior tendinopathy, Achilles tendinopathy and plantar fasciitis bilaterally.  Diagnosis and treatment options discussed with patient.  All the toenails 1-5 bilaterally were debrided upon consent.  Prescription for physical therapy given and use discussed with her.  I gave her a prescription for new diabetic shoes and insoles and use discussed with her.  Prescription for Loprox cream given and use discussed with her.  She will return to clinic and see me in about 3 months.  I did review her previous ABIs as noted in the EMR.         Again, thank you for allowing me to participate in the care of your patient.        Sincerely,        Norman Jean DPM

## 2018-11-27 NOTE — PROGRESS NOTES
Past Medical History:   Diagnosis Date     A-fib (H) 2011    on coumadin since 1/13     Asthma     as a kid     Chest pain 2/1/2017     Chronic anticoagulation for a-fib 2/15/2013    INR's followed by coumadin clinic at      Diabetes mellitus (H) 2012     Diastolic heart failure 2/15/2013     Dilated cardiomyopathy (H) 1/8/2013     HTN (hypertension)      Hyperthyroidism     Graves, s/p I131 1/13, now on prednisone and methimazole     Morbid obesity (H)      Pulmonary embolism (H) 1/12    hospitalized in [place], on lovenox/coumadin for a few months but stopped, hypercoag w/u neg per pt     Sleep apnea     using CPAP     Patient Active Problem List   Diagnosis     Chronic atrial fibrillation (HCC)     Dilated cardiomyopathy (H)     Morbid obesity (H)     Diabetes mellitus, type 2 (H)     Chronic diastolic heart failure (H)     Chronic anticoagulation for a-fib     JYOTI (obstructive sleep apnea) CPAP Min Pressure of 13 and Max Pressure of 18     Hypothyroidism following radioiodine therapy     SOB (shortness of breath)     Azotemia     Asthma     Peritonsillar abscess     Plantar fasciitis     Neuropathic pain of lower extremity     Atrial fibrillation (H) [I48.91]     Long-term (current) use of anticoagulants [Z79.01]     Cellulitis     Chest pain     Foot pain     Class 3 obesity due to excess calories with serious comorbidity and body mass index (BMI) greater than or equal to 70 in adult     No past surgical history on file.  Social History     Social History     Marital status: Single     Spouse name: N/A     Number of children: N/A     Years of education: N/A     Occupational History     Not on file.     Social History Main Topics     Smoking status: Light Tobacco Smoker     Packs/day: 0.25     Years: 13.00     Types: Cigarettes     Smokeless tobacco: Never Used      Comment: down to 5 cigs     Alcohol use No     Drug use: No     Sexual activity: Yes     Partners: Male     Birth control/ protection: Condom      Other Topics Concern     Not on file     Social History Narrative    Single, no children        Gyn:        Last pap several years ago, no abnormal    No STIs            Patient is single.  She is no longer working.  She used to work in the area of customer service.  She is currently living with her sister in [place], [place].  She has no pets.  Patient has smoked a half pack of cigarettes a day for the past 10 plus years.  She states that she is down to 5 cigarettes a day with the aid of Wellbutrin.  She does not smoke cigars, pipes or chew tobacco.  She has 1 cup of coffee in the morning.  She does not drink alcohol.  Patient does not exercise.      Family History   Problem Relation Age of Onset     Thyroid Disease Mother      Grave's D     Diabetes Mother      HEART DISEASE Mother      Cerebrovascular Disease Mother      dec. 54 yo     Hypertension Mother      HEART DISEASE Sister      Heart Murmur     Diabetes Sister      HEART DISEASE Father      dec in his 30s, MI     Psychotic Disorder Brother      Bipolar     Diabetes Brother      Thyroid Disease Brother      Hyper Thyroid     HEART DISEASE Brother      Thyroid Disease Sister      Hyper Thyroid     Thyroid Disease Sister      Hyper Thyroid     Thyroid Disease Sister      Mental Health Problems     Thyroid Disease Maternal Aunt      Thyroid Disease Maternal Uncle      Cancer No family hx of      Glaucoma No family hx of      Macular Degeneration No family hx of      Lab Results   Component Value Date    A1C 8.4 10/11/2018    Inr                                2.13          10/30/2018  Last Comprehensive Metabolic Panel:  Sodium   Date Value Ref Range Status   10/25/2018 132 (L) 133 - 144 mmol/L Final     Potassium   Date Value Ref Range Status   10/25/2018 4.1 3.4 - 5.3 mmol/L Final     Chloride   Date Value Ref Range Status   10/25/2018 98 94 - 109 mmol/L Final     Carbon Dioxide   Date Value Ref Range Status   10/25/2018 28 20 - 32 mmol/L  Final     Anion Gap   Date Value Ref Range Status   10/25/2018 6 3 - 14 mmol/L Final     Glucose   Date Value Ref Range Status   10/25/2018 203 (H) 70 - 99 mg/dL Final     Urea Nitrogen   Date Value Ref Range Status   10/25/2018 17 7 - 30 mg/dL Final     Creatinine   Date Value Ref Range Status   10/25/2018 1.15 (H) 0.52 - 1.04 mg/dL Final     GFR Estimate   Date Value Ref Range Status   10/25/2018 51 (L) >60 mL/min/1.7m2 Final     Comment:     Non  GFR Calc     Calcium   Date Value Ref Range Status   10/25/2018 8.5 8.5 - 10.1 mg/dL Final       SUBJECTIVE FINDINGS:  43-year-old female presents for diabetic foot care and foot pain bilaterally.  She relates she needs new diabetic shoes and she needs her toenails cut.  She relates no injuries since we have seen her last.  No ulcers or sores.  She relates she does have numbness, tingling and neuropathy.  Relates her feet just hurt on the heel, back of the heel and mediolateral ankles.  Presents in her scooter chair today.        OBJECTIVE FINDINGS:  DP and PT are 2/4 bilaterally.  She has dystrophic, thick and brittle nails with subungual debris, dystrophy and discoloration 1-5 bilaterally to differing degrees.  She has hyperkeratotic tissue buildup plantar left heel.  She has a flexible flat foot on the left.  She has mild distally dorsally contracted digits, right greater than left bilaterally.  Deep tendon reflexes are intact bilaterally.  No gross tendon voids bilaterally.  Sharp/dull decreased with 5.07 [place] monofilament bilaterally.  No erythema, no drainage, no odor, no calor bilaterally.  She has pain on palpation along the peroneal tendon, the tibialis posterior tendon bilaterally, posterior heel along the Achilles tendon bilaterally and plantar heel bilaterally.  No pain on range of motion bilaterally.  She has some mild dry, scaly skin in a moccasin pattern bilaterally, left greater than right foot.  She relates she has been using  coconut oil on her feet intermittently.      ASSESSMENT/PLAN:  Diabetes with peripheral neuropathy.  Onychomycosis.  Tinea pedis changes.  She has a flexible flat foot on the left versus the right.  She has mild hammertoes present.  She has peroneal tendinopathy and tibialis posterior tendinopathy, Achilles tendinopathy and plantar fasciitis bilaterally.  Diagnosis and treatment options discussed with patient.  All the toenails 1-5 bilaterally were debrided upon consent.  Prescription for physical therapy given and use discussed with her.  I gave her a prescription for new diabetic shoes and insoles and use discussed with her.  Prescription for Loprox cream given and use discussed with her.  She will return to clinic and see me in about 3 months.  I did review her previous ABIs as noted in the EMR.

## 2018-11-27 NOTE — TELEPHONE ENCOUNTER
11/27/18 called and left voicemail. Left phone number [phone] for patient to call and schedule 3 month follow up appointment with Dr. Jean.     Call center ok to schedule.     Sol Ingram   Procedure    Ortho/Sports Med/Ent/Eye   [facility]   [phone]

## 2018-11-27 NOTE — NURSING NOTE
Pricilla Brown's chief complaint for this visit includes:  Chief Complaint   Patient presents with     Right Foot - RECHECK, Pain     Left Foot - Pain     PCP: Jamilah Bernal    Referring Provider:  No referring provider defined for this encounter.    /85  Pulse 84  Temp 98.6  F (37  C) (Oral)  Resp 18  SpO2 100%  Extreme Pain (9)     Do you need any medication refills at today's visit? Needs refill on diabetic shoes RX

## 2018-11-28 ENCOUNTER — DOCUMENTATION ONLY (OUTPATIENT)
Dept: SLEEP MEDICINE | Facility: CLINIC | Age: 43
End: 2018-11-28

## 2018-11-28 NOTE — PROGRESS NOTES
Patient returns here today in follow up for complaince and response to cpap and to evaluate fragmented sleep     HPI:History of severe obstructive sleep apnea with a polysomnogram, 03/2013, showing an AHI of 68.9.  Time less than 89% at 3.1 minutes.   Largest concern in past has been missed opportunities.  Overnight oximetry done on 05/23/2016 showed O2 sats less than 89%, 48 seconds. Wt 468#.       3/17 visit:Use of APAP 29/30 days.  Average usage on days used 8 hours and 53 minutes, 90th percentile pressure 15.3 cm of water.Residual AHI is 0.9.  Daily usage shows some pancaking while she was sick with an upper respiratory as well as watching videos at nights and having CPAP on just in case she would fall asleep ( previous intervention to  all nights rest, but overextended)     New concerns:ripped mask, too much time in bed-much with cpap mask on in case she'll fall asleep    See kristi's note re: wt loss, increase in exercise, bariatric services    Inc in hydralizine at hS    Download today:apap 5-15 cm h20, 27/30 days with usage >=4 hrs, avg usage days used 10:17, leaks 34.6l/min at 95th percentile, pressure 14.9cm h20 at 95th percentile, residual AHI 11.2 (unknown 3.0, OAI 2.9, Hi 5.2, RERA index 4.9)   A/P: excessive time in bed on cpap with increased efforts to  inadvertent napping while watching tv, excessive fragmentation with response to 6 pm lasix, often till [id number],         1. JYOTI: mask fit-torn cushion again, will check synopsis in 1 wk, if leak better will increase pressures and let Pricilla know when done, remove hose from mask with bathroom use, see time zone suggested below  2. Obesity: limit time in bed to enchance sleep quality, suggest after 10pm till 9A.  Activity level should increase with increase in time out of bed.  Has other places to watch tv, relax.  Suggested multitask to avoid dozing with relaxation outside of bed. Continues to work with nutrition and cardiology.  3.  Sleep habits: too much time in bed, on cpap, without intentions of sleeping. See # 2 above for sleep window.

## 2018-11-29 ENCOUNTER — OFFICE VISIT (OUTPATIENT)
Dept: SLEEP MEDICINE | Facility: CLINIC | Age: 43
End: 2018-11-29
Payer: MEDICARE

## 2018-11-29 DIAGNOSIS — G47.33 OSA (OBSTRUCTIVE SLEEP APNEA): Primary | ICD-10-CM

## 2018-11-29 DIAGNOSIS — G47.8 UNHEALTHY SLEEP HABIT: ICD-10-CM

## 2018-11-29 DIAGNOSIS — I10 ESSENTIAL HYPERTENSION: ICD-10-CM

## 2018-11-29 DIAGNOSIS — E66.813 CLASS 3 SEVERE OBESITY WITH BODY MASS INDEX (BMI) GREATER THAN OR EQUAL TO 70 IN ADULT, UNSPECIFIED OBESITY TYPE, UNSPECIFIED WHETHER SERIOUS COMORBIDITY PRESENT (H): ICD-10-CM

## 2018-11-29 DIAGNOSIS — E66.01 CLASS 3 SEVERE OBESITY WITH BODY MASS INDEX (BMI) GREATER THAN OR EQUAL TO 70 IN ADULT, UNSPECIFIED OBESITY TYPE, UNSPECIFIED WHETHER SERIOUS COMORBIDITY PRESENT (H): ICD-10-CM

## 2018-11-29 DIAGNOSIS — I42.0 DILATED CARDIOMYOPATHY (H): ICD-10-CM

## 2018-11-29 PROCEDURE — 99214 OFFICE O/P EST MOD 30 MIN: CPT | Performed by: NURSE PRACTITIONER

## 2018-11-29 NOTE — NURSING NOTE
"Chief Complaint   Patient presents with     CPAP Follow Up       Initial /90  Pulse 86  Resp 16  Ht 1.702 m (5' 7.01\")  Wt (!) 220 kg (485 lb)  SpO2 99%  BMI 75.94 kg/m2 Estimated body mass index is 75.94 kg/(m^2) as calculated from the following:    Height as of this encounter: 1.702 m (5' 7.01\").    Weight as of this encounter: 220 kg (485 lb).    Medication Reconciliation: complete    Neck circumference:  inches /  centimeters.    DME: ROBINA Sanchez [facility] ~[place]    "

## 2018-11-29 NOTE — MR AVS SNAPSHOT
After Visit Summary   11/29/2018    Pricilla Brown    MRN: [id number]           Patient Information     Date Of Birth          1975        Visit Information        Provider Department      11/29/2018 2:30 PM Alecia Ramirez APRN [facility]        Care Instructions      Your BMI is Body mass index is 75.94 kg/(m^2).  Weight management is a personal decision.  If you are interested in exploring weight loss strategies, the following discussion covers the approaches that may be successful. Body mass index (BMI) is one way to tell whether you are at a healthy weight, overweight, or obese. It measures your weight in relation to your height.  A BMI of 18.5 to 24.9 is in the healthy range. A person with a BMI of 25 to 29.9 is considered overweight, and someone with a BMI of 30 or greater is considered obese. More than two-thirds of American adults are considered overweight or obese.  Being overweight or obese increases the risk for further weight gain. Excess weight may lead to heart disease and diabetes.  Creating and following plans for healthy eating and physical activity may help you improve your health.  Weight control is part of healthy lifestyle and includes exercise, emotional health, and healthy eating habits. Careful eating habits lifelong are the mainstay of weight control. Though there are significant health benefits from weight loss, long-term weight loss with diet alone may be very difficult to achieve- studies show long-term success with dietary management in less than 10% of people. Attaining a healthy weight may be especially difficult to achieve in those with severe obesity. In some cases, medications, devices and surgical management might be considered.  What can you do?  If you are overweight or obese and are interested in methods for weight loss, you should discuss this with your provider.     Consider reducing daily calorie intake by 500 calories.      Keep a food journal.     Avoiding skipping meals, consider cutting portions instead.    Diet combined with exercise helps maintain muscle while optimizing fat loss. Strength training is particularly important for building and maintaining muscle mass. Exercise helps reduce stress, increase energy, and improves fitness. Increasing exercise without diet control, however, may not burn enough calories to loose weight.       Start walking three days a week 10-20 minutes at a time    Work towards walking thirty minutes five days a week     Eventually, increase the speed of your walking for 1-2 minutes at time    In addition, we recommend that you review healthy lifestyles and methods for weight loss available through the National Institutes of Health patient information sites:  http://win.niddk.nih.gov/publications/index.htm    And look into health and wellness programs that may be available through your health insurance provider, employer, local community center, or james club.    Weight management plan: Patient was referred to their PCP to discuss a diet and exercise plan.     Your blood pressure was checked while you were in clinic today.  Please read the guidelines below about what these numbers mean and what you should do about them.  Your systolic blood pressure is the top number.  This is the pressure when the heart is pumping.  Your diastolic blood pressure is the bottom number.  This is the pressure in between beats.  If your systolic blood pressure is less than 120 and your diastolic blood pressure is less than 80, then your blood pressure is normal. There is nothing more that you need to do about it  If your systolic blood pressure is 120-139 or your diastolic blood pressure is 80-89, your blood pressure may be higher than it should be.  You should have your blood pressure re-checked within a year by a primary care provider.  If your systolic blood pressure is 140 or greater or your diastolic blood pressure  is 90 or greater, you may have high blood pressure.  High blood pressure is treatable, but if left untreated over time it can put you at risk for heart attack, stroke, or kidney failure.  You should have your blood pressure re-checked by a primary care provider within the next four weeks.                Follow-ups after your visit        Follow-up notes from your care team     Return for down load in 2 wks, I'll my chart you.      Your next 10 appointments already scheduled     Nov 30, 2018  9:30 AM CST   (Arrive by 9:15 AM)   Return Weight Management Visit with YARY Crawley [facility] ([facility])    [address]  [address] [facility] [address]   [phone]            Dec 03, 2018 12:30 PM CST   (Arrive by 12:15 PM)   Return Weight Management Visit with SHANE Murcia [facility] Medical Weight Management ([facility])    [address]  [address] [facility] [address]   [phone]            Dec 07, 2018  3:15 PM CST   Pool Treatment with Chantal Chavis, PT   [facility] ([facility])    [address]  [address]  [facility] [address]   [phone]            Dec 12, 2018  1:00 PM CST   Pool Treatment with Chantal Chavis, PT   [facility] ([facility])    [address]  [address]  [facility] [address]   [phone]            Dec 14, 2018 11:30 AM CST   (Arrive by 11:15 AM)   RETURN DIABETES with SHANE Rojas [facility] Endocrinology ([facility])    [address]  3rd Floor  [facility] [address]   [phone]            Dec 17, 2018 11:30 AM CST   Lab with  LAB   [facility] ([facility])    [address]  [address] [facility] [address]   [phone]            Dec 17, 2018 12:00 PM CST    (Arrive by 11:45 AM)   CORE RETURN with Ximena Carpenter NP   [facility] ([facility])    [address]  [address]  [facility] [address]   [phone]            Dec 19, 2018  1:00 PM CST   Pool Treatment with Chantal Chavis, PT   [facility] ([facility])    [address]  [address]  [place] [address]   [phone]            Dec 21, 2018  1:45 PM CST   Pool Treatment with Chantal Chavis, PT   [facility] ([facility])    [address] [place] [address]  [address]  [place] [address]   [phone]            Dec 28, 2018  2:30 PM CST   Pool Treatment with Chantal Chavis, PT   [facility] ([facility])    [address]  [address]  [facility] [address]   [phone]              Who to contact     If you have questions or need follow up information about today's clinic visit or your schedule please contact [facility] directly at [phone].  Normal or non-critical lab and imaging results will be communicated to you by Swapdomhart, letter or phone within 4 business days after the clinic has received the results. If you do not hear from us within 7 days, please contact the clinic through Swapdomhart or phone. If you have a critical or abnormal lab result, we will notify you by phone as soon as possible.  Submit refill requests through Rayspan or call your pharmacy and they will forward the refill request to us. Please allow 3 business days for your refill to be completed.          Additional Information About Your Visit        Rayspan Information     Rayspan gives you secure access to your electronic health record. If you see a primary care provider, you can also send messages to your care team and make appointments. If you have questions, please call your primary care clinic.  If you do not have a  "primary care provider, please call [phone] and they will assist you.        Care EveryWhere ID     This is your Care EveryWhere ID. This could be used by other organizations to access your [facility] medical records  [id number]        Your Vitals Were     Pulse Respirations Height Pulse Oximetry BMI (Body Mass Index)       86 16 1.702 m (5' 7.01\") 99% 75.94 kg/m2        Blood Pressure from Last 3 Encounters:   11/29/18 142/90   11/27/18 143/85   10/25/18 149/86    Weight from Last 3 Encounters:   11/29/18 (!) 220 kg (485 lb)   10/30/18 (!) 215.1 kg (474 lb 1.6 oz)   10/25/18 (!) 211.4 kg (466 lb)              Today, you had the following     No orders found for display       Primary Care Provider Office Phone # Fax #    Jamilah Bernal [phone]       [address] [address]        Equal Access to Services     KIERSTEN DRIVER : Hadii miki altman hadasho Soomaali, waaxda luqadaha, qaybta kaalmada adeegyada, bola hurt . So [facility] [place] [facility] [phone].    ATENCIÓN: Si habla español, tiene a de la rosa disposición servicios gratuitos de asistencia lingüística. Llame al [phone].    We comply with applicable federal civil rights laws and [place] laws. We do not discriminate on the basis of race, color, national origin, age, disability, sex, sexual orientation, or gender identity.            Thank you!     Thank you for choosing [facility]  for your care. Our goal is always to provide you with excellent care. Hearing back from our patients is one way we can continue to improve our services. Please take a few minutes to complete the written survey that you may receive in the mail after your visit with us. Thank you!             Your Updated Medication List - Protect others around you: Learn how to safely use, store and throw away your medicines at www.disposemymeds.org.          This list is accurate as of 11/29/18  3:20 PM.  Always use " your most recent med list.                   Brand Name Dispense Instructions for use Diagnosis    acetaminophen 325 MG tablet    TYLENOL    180 tablet    Take 2 tablets (650 mg) by mouth 3 times daily as needed for mild pain or fever (total acetaminophen dose should not exceed 3000 mg per day)    Neuropathic pain of lower extremity, unspecified laterality       albuterol 108 (90 Base) MCG/ACT inhaler    PROAIR HFA/PROVENTIL HFA/VENTOLIN HFA    1 Inhaler    Inhale 2 puffs into the lungs every 6 hours as needed    Mild intermittent asthma without complication       bisacodyl 10 MG suppository    DULCOLAX    6 suppository    Place 1 suppository (10 mg) rectally daily as needed for constipation    Constipation       blood glucose lancets standard    NO BRAND SPECIFIED    100 each    USE TO CHECK  blood sugar fasting each am  prelunch and predinner daily OR AS DIRECTED Call clinic to schedule MD APPT.    Diabetes mellitus, type 2 (H)       * blood glucose monitoring meter device kit     1 kit    -PLEASE GIVE PATIENT A DEVICE HER INSURANCE WILL COVER-    Type 2 diabetes mellitus with hyperglycemia, with long-term current use of insulin (H)       * blood glucose monitoring meter device kit    NO BRAND SPECIFIED    1 kit    Use to test blood sugar 3 times daily or as directed.    Diabetes mellitus, type 2 (H)       blood glucose monitoring test strip    NO BRAND SPECIFIED    100 strip    Use to test blood sugars 3 times daily or as directed    Diabetes mellitus, type 2 (H)       buPROPion 100 MG 12 hr tablet    WELLBUTRIN SR    180 tablet    Take 1 tablet (100 mg) by mouth 2 times daily    Tobacco abuse       capsaicin 0.025 % external cream    ZOSTRIX    1 Tube    Apply 1 g topically 3 times daily as needed    Dermatophytosis of nail       ciclopirox 0.77 % cream    LOPROX    90 g    Apply topically 2 times daily To feet and toenails.    Onychomycosis, Tinea pedis of both feet       ciclopirox 8 % external solution     PENLAC    1 Bottle    Externally apply topically daily To toenails.    Dermatophytosis of nail       * clotrimazole 1 % external cream    LOTRIMIN    30 g    Apply topically 2 times daily    Tinea pedis of right foot       * clotrimazole 1 % external cream    LOTRIMIN    60 g    Apply topically 2 times daily as needed (skin irritation)    Furuncle of abdominal wall       colchicine 0.6 MG tablet    COLCYRS    30 tablet    Take 1-2 tablets (0.6-1.2 mg) by mouth daily as needed for moderate pain *Call for appointment [phone]. Over 12 months since last seen.    Acute gouty arthritis       diclofenac 1 % topical gel    VOLTAREN    100 g    Apply 4 grams to knees or 2 grams to hands four times daily using enclosed dosing card.    Left foot pain       dulaglutide 1.5 MG/0.5ML pen    TRULICITY    45 mL    Inject 1.5 mg Subcutaneous every 7 days    Type 2 diabetes mellitus with hyperglycemia, with long-term current use of insulin (H)       DULoxetine 20 MG capsule    CYMBALTA    60 capsule    Take 1 capsule (20 mg) by mouth 2 times daily    Depression       Efinaconazole 10 % Soln     8 mL    Externally apply topically daily To toenails.    Dermatophytosis of nail       gabapentin 300 MG capsule    NEURONTIN    120 capsule    Take 2 capsules (600 mg) by mouth 2 times daily Needs PCP appt for further refills    Type 2 diabetes mellitus with hyperglycemia, with long-term current use of insulin (H)       hydrALAZINE 25 MG tablet    APRESOLINE    450 tablet    Take 50-75 mg by mouth Take 2 tabs (50 mg) in am, 2 tabs (50 mg) midday, and 3 tabs (75 mg) in pm daily    Dilated cardiomyopathy (H)       insulin glargine U-300 300 UNIT/ML injection    TOUJEO    30 mL    Inject 170 units SQ each am.    Type 2 diabetes mellitus with hyperglycemia, with long-term current use of insulin (H)       insulin pen needle 32G X 4 MM miscellaneous    BD RIVER U/F    600 each    Use 6 daily or as directed    Diabetes mellitus, type 2 (H)        levothyroxine 200 MCG tablet    SYNTHROID/LEVOTHROID    102 tablet    Take 1 tab Monday thru Saturday and 2 tabs on Sundays.    Other specified hypothyroidism       metolazone 2.5 MG tablet    ZAROXOLYN    60 tablet    Take 1 tablet (2.5 mg) by mouth 2 times daily Take 1/2 hour prior to torsemide    Acute on chronic systolic heart failure (H)       metoprolol succinate  MG 24 hr tablet    TOPROL-XL    90 tablet    Take 1 tablet (100 mg) by mouth daily    Chronic diastolic heart failure (H)       morphine 15 MG CR tablet    MS CONTIN     Take 15 mg by mouth daily as needed        Nebulizer Compressor Kit     1 kit    1 Device 4 times daily as needed.    COPD (chronic obstructive pulmonary disease) (H)       * nicotine 14 MG/24HR 24 hr patch    [employer] NICOTINE    30 patch    Place 1 patch onto the skin every 24 hours    Tobacco abuse       * nicotine 10 MG inhaler    NICOTROL    1 Box    Inhale short puffs over 5-15 minutes every 1-2 hours as directed.    Tobacco abuse       * nicotine 14 MG/24HR 24 hr patch    NICODERM CQ    90 patch    Place 1 patch onto the skin every 24 hours    Tobacco abuse       NovoLOG FLEXPEN 100 UNIT/ML pen   Generic drug:  insulin aspart     180 mL    Inject 70 units with meals plus correction. Pt uses approx 200 units in 24 hrs.    Type 2 diabetes mellitus with hyperglycemia, with long-term current use of insulin (H)       nystatin 322059 UNIT/GM external cream    MYCOSTATIN    90 g    Apply topically 2 times daily To toenails    Dermatophytosis of nail       order for DME     1 each    Use your CPAP device as directed by your provider. Pressure change to min 13 max 18cwp        * order for DME     1 each    Equipment being ordered: Challenger Herminia walker if available - patient needs seat, basket and brakes.    Dilated cardiomyopathy (H), Chronic systolic heart failure (H)       * order for DME     1 each    Equipment being ordered: BI [id number] $92  Plantar, fasciitis, LG, night  splint    Dermatophytosis of nail, Plantar fasciitis of right foot, Diabetic neuropathy with neurologic complication (H), Peroneal tendinitis, right       * order for DME     1 Units    Left foot    Left foot pain, Diabetic neuropathy with neurologic complication (H)       oxyCODONE-acetaminophen 5-325 MG tablet    PERCOCET    60 tablet    Take 1 tablet by mouth every 8 hours as needed for moderate to severe pain (try to limit use, no further prescriptions until seen in pain clinic)    Lumbago, Bilateral low back pain with sciatica, sciatica laterality unspecified       polyethylene glycol powder    MIRALAX/GLYCOLAX          potassium chloride ER 20 MEQ CR tablet    K-DUR/KLOR-CON M    240 tablet    TAKE TWO TABLETS BY MOUTH FOUR TIMES A DAY    Hypokalemia       senna 8.6 MG tablet    SENOKOT    45 tablet    Take 1 tablet by mouth 2 times daily    Constipation       spironolactone 50 MG tablet    ALDACTONE    30 tablet    Take 1 tablet (50 mg) by mouth daily        tiotropium 18 MCG inhaled capsule    SPIRIVA HANDIHALER    30 capsule    Inhale contents of one capsule daily.    COPD (chronic obstructive pulmonary disease) (H)       topiramate 50 MG tablet    TOPAMAX    270 tablet    Take 2 tablets (100 mg) by mouth daily Take 100 mg for 1 month and increase to 150 mg thereafter    Morbid obesity (H), Type 2 diabetes mellitus with diabetic polyneuropathy, without long-term current use of insulin (H)       torsemide 100 MG tablet    DEMADEX    180 tablet    Take 1 tablet (100 mg) by mouth 2 times daily    Chronic diastolic heart failure (H)       * warfarin 5 MG tablet    COUMADIN    90 tablet    Take one to two tablets daily or as directed by the Coumadin Clinic    Atrial fibrillation, unspecified type (H), Long-term (current) use of anticoagulants       * warfarin 5 MG tablet    COUMADIN    170 tablet    [id number] on [id number], and 20mg on WF, or as directed by the Medication Monitoring Clinic at the [facility]     Atrial fibrillation, unspecified type (H)       * Notice:  This list has 12 medication(s) that are the same as other medications prescribed for you. Read the directions carefully, and ask your doctor or other care provider to review them with you.

## 2018-11-29 NOTE — PROGRESS NOTES
"CC: asking for repairs, tubing thing pops off and not getting any sleep on her cpap, told not in compliance    Hpi:History of severe obstructive sleep apnea with a polysomnogram, 03/2013, showing an AHI of 68.9.  Time less than 89% at 3.1 minutes.   Largest concern in past has been missed opportunities.  Overnight oximetry done on 05/23/2016 showed O2 sats less than 89%, 48 seconds. Wt 468#.       3/17 visit:Use of APAP 29/30 days.  Average usage on days used 8 hours and 53 minutes, 90th percentile pressure 15.3 cm of water.Residual AHI is 0.9.  Daily usage shows some pancaking while she was sick with an upper respiratory as well as watching videos at nights and having CPAP on just in case she would fall asleep ( previous intervention to  all nights rest, but overextended) efforts be up and out of bed without cpap on while relaxing seem to result in [id number] opporturnity.    Today's visit:4/18/18:  New machine, Door for filter broken off, Tube thing pops off, not getting therapy and not getting help \"noncompliant\".  Chart reviewed with pt.    Download today: 24/30 days with usage > 4 hrs (80%) residual AHI 16.2  95th percentile leak 41.3, pressure 95th percentile 14.7 cm h20, daily details indicates some days with significant number of obstructives and unknowns leading repeated use of ramp along with periods of excessive leak.    Allergies:    Allergies   Allergen Reactions     Penicillins Other (See Comments)     CHILDHOOD ALLERGY     Ibuprofen Sodium Hives and Rash       Medications:    Current Outpatient Prescriptions   Medication Sig Dispense Refill     acetaminophen (TYLENOL) 325 MG tablet Take 2 tablets (650 mg) by mouth 3 times daily as needed for mild pain or fever (total acetaminophen dose should not exceed 3000 mg per day) 180 tablet 5     albuterol (PROAIR HFA/PROVENTIL HFA/VENTOLIN HFA) 108 (90 Base) MCG/ACT inhaler Inhale 2 puffs into the lungs every 6 hours as needed 1 Inhaler 11     bisacodyl " (DULCOLAX) 10 MG suppository Place 1 suppository (10 mg) rectally daily as needed for constipation 6 suppository 0     blood glucose (NO BRAND SPECIFIED) lancets standard USE TO CHECK  blood sugar fasting each am  prelunch and predinner daily OR AS DIRECTED Call clinic to schedule MD APPT. 100 each 0     blood glucose monitoring (NO BRAND SPECIFIED) meter device kit Use to test blood sugar 3 times daily or as directed. 1 kit 1     blood glucose monitoring (NO BRAND SPECIFIED) test strip Use to test blood sugars 3 times daily or as directed 100 strip 11     Blood Glucose Monitoring Suppl (IBG STAR) W/DEVICE KIT -PLEASE GIVE PATIENT A DEVICE HER INSURANCE WILL COVER- 1 kit 0     buPROPion (WELLBUTRIN SR) 100 MG 12 hr tablet Take 1 tablet (100 mg) by mouth 2 times daily 180 tablet 1     capsaicin (ZOSTRIX) 0.025 % CREA cream Apply 1 g topically 3 times daily as needed 1 Tube 0     ciclopirox (LOPROX) 0.77 % cream Apply topically 2 times daily To feet and toenails. 90 g 6     ciclopirox 8 % SOLN Externally apply topically daily To toenails. 1 Bottle 11     clotrimazole (LOTRIMIN) 1 % cream Apply topically 2 times daily as needed (skin irritation) 60 g 1     clotrimazole (LOTRIMIN) 1 % cream Apply topically 2 times daily 30 g 1     colchicine (COLCYRS) 0.6 MG tablet Take 1-2 tablets (0.6-1.2 mg) by mouth daily as needed for moderate pain *Call for appointment [phone]. Over 12 months since last seen. 30 tablet 0     diclofenac (VOLTAREN) 1 % GEL topical gel Apply 4 grams to knees or 2 grams to hands four times daily using enclosed dosing card. 100 g 1     dulaglutide (TRULICITY) 1.5 MG/0.5ML pen Inject 1.5 mg Subcutaneous every 7 days 45 mL 3     DULoxetine (CYMBALTA) 20 MG capsule Take 1 capsule (20 mg) by mouth 2 times daily 60 capsule 0     Efinaconazole 10 % SOLN Externally apply topically daily To toenails. 8 mL 11     gabapentin (NEURONTIN) 300 MG capsule Take 2 capsules (600 mg) by mouth 2 times daily Needs  PCP appt for further refills 120 capsule 0     hydrALAZINE (APRESOLINE) 25 MG tablet Take 50-75 mg by mouth Take 2 tabs (50 mg) in am, 2 tabs (50 mg) midday, and 3 tabs (75 mg) in pm daily  450 tablet 3     insulin glargine U-300 (TOUJEO) 300 UNIT/ML injection Inject 170 units SQ each am. 30 mL 3     insulin pen needle (BD RIVER U/F) 32G X 4 MM Use 6 daily or as directed 600 each 3     levothyroxine (SYNTHROID/LEVOTHROID) 200 MCG tablet Take 1 tab Monday thru Saturday and 2 tabs on Sundays. 102 tablet 3     metolazone (ZAROXOLYN) 2.5 MG tablet Take 1 tablet (2.5 mg) by mouth 2 times daily Take 1/2 hour prior to torsemide 60 tablet 1     metoprolol succinate (TOPROL-XL) 100 MG 24 hr tablet Take 1 tablet (100 mg) by mouth daily 90 tablet 3     morphine (MS CONTIN) 15 MG 12 hr tablet Take 15 mg by mouth daily as needed       nicotine ([employer] NICOTINE) 14 MG/24HR patch 2h hr Place 1 patch onto the skin every 24 hours 30 patch 1     nicotine (NICODERM CQ) 14 MG/24HR 24 hr patch Place 1 patch onto the skin every 24 hours 90 patch 2     nicotine (NICOTROL) 10 MG Inhaler Inhale short puffs over 5-15 minutes every 1-2 hours as directed. 1 Box 4     NOVOLOG FLEXPEN 100 UNIT/ML soln Inject 70 units with meals plus correction. Pt uses approx 200 units in 24 hrs. 180 mL 3     nystatin (MYCOSTATIN) cream Apply topically 2 times daily To toenails 90 g 6     order for DME Left foot 1 Units 0     order for DME Equipment being ordered: [id number] $92   Plantar, fasciitis, LG, night splint 1 each 0     order for DME Equipment being ordered: Challenger Herminia walker if available - patient needs seat, basket and brakes. 1 each 0     ORDER FOR DME Use your CPAP device as directed by your provider. Pressure change to min 13 max 18cwp 1 each 99     oxyCODONE-acetaminophen (PERCOCET) 5-325 MG per tablet Take 1 tablet by mouth every 8 hours as needed for moderate to severe pain (try to limit use, no further prescriptions until seen in pain  clinic) 60 tablet 0     polyethylene glycol (MIRALAX/GLYCOLAX) powder        potassium chloride SA (K-DUR/KLOR-CON M) 20 MEQ CR tablet TAKE TWO TABLETS BY MOUTH FOUR TIMES A  tablet 11     Respiratory Therapy Supplies (NEBULIZER COMPRESSOR) KIT 1 Device 4 times daily as needed. 1 kit 3     senna (SENOKOT) 8.6 MG tablet Take 1 tablet by mouth 2 times daily 45 tablet 0     spironolactone (ALDACTONE) 50 MG tablet Take 1 tablet (50 mg) by mouth daily 30 tablet 11     tiotropium (SPIRIVA HANDIHALER) 18 MCG inhalation capsule Inhale contents of one capsule daily. 30 capsule 1     topiramate (TOPAMAX) 50 MG tablet Take 2 tablets (100 mg) by mouth daily Take 100 mg for 1 month and increase to 150 mg thereafter 270 tablet 3     torsemide (DEMADEX) 100 MG tablet Take 1 tablet (100 mg) by mouth 2 times daily 180 tablet 1     warfarin (COUMADIN) 5 MG tablet [id number] on [id number], and 20mg on WF, or as directed by the Medication Monitoring Clinic at the [facility]. 170 tablet 1     warfarin (COUMADIN) 5 MG tablet Take one to two tablets daily or as directed by the Coumadin Clinic 90 tablet 3       Problem List:  Patient Active Problem List    Diagnosis Date Noted     Class 3 obesity due to excess calories with serious comorbidity and body mass index (BMI) greater than or equal to 70 in adult 07/20/2018     Priority: Medium     Foot pain 04/06/2017     Priority: Medium     Chest pain 02/01/2017     Priority: Medium     Cellulitis 10/14/2016     Priority: Medium     Atrial fibrillation (H) [I48.91] 06/06/2016     Priority: Medium     Long-term (current) use of anticoagulants [Z79.01] 06/06/2016     Priority: Medium     Plantar fasciitis 09/07/2015     Priority: Medium     Neuropathic pain of lower extremity 09/07/2015     Priority: Medium     Peritonsillar abscess 09/30/2014     Priority: Medium     Asthma 07/16/2013     Priority: Medium     Problem list name updated by automated process. Provider to review       Azotemia  04/30/2013     Priority: Medium     SOB (shortness of breath) 03/25/2013     Priority: Medium     Hypothyroidism following radioiodine therapy 03/07/2013     Priority: Medium     JYOTI (obstructive sleep apnea) CPAP Min Pressure of 13 and Max Pressure of 18 03/05/2013     Priority: Medium     3/4/13 PSG - AHI 62, started on APAP 13-18 with FFM       Chronic diastolic heart failure (H) 02/15/2013     Priority: Medium     CORE Pt  EDW is about 437#                 Chronic anticoagulation for a-fib 02/15/2013     Priority: Medium     INR's followed by coumadin clinic at        Morbid obesity (H) 01/30/2013     Priority: Medium     Diabetes mellitus, type 2 (H) 01/30/2013     Priority: Medium     Dilated cardiomyopathy (H) 01/08/2013     Priority: Medium     Right Heart Procedure Note:  July 20, 2015  Indication: assessment of the filling pressures   Procedure   Access: 7 Fr sheath without complications in the right IJ  Findings   RA: 9/10/6  RV 30/7  PA 30/15/22  PCWP 16/16/14  Catarina CO 4.95 (1.69) TD CO 7.3 (2.5)   TPR 4.45 PVR 1.62  PaSat 59.3 Hb 13.4  Impression   - Normal RV and LV filling pressures.   - Cardiomyopathy       Right Heart Cath: 5/1/13  RHC, acute exacerbation of HF was ruled out yesterday. (RA 11, PA s29 ED12, PA 17 and PCWP 14.) Since last RHC in [place] the Cardiac index (Catarina) is up from 1.6 to1.9.  ECHO: 3/26/2013  Interpretation Summary  Moderate left ventricular dilation is present. The Ejection Fraction is   estimated at 40-45%. Pulmonary artery systolic pressure cannot be assessed.   The inferior vena cava is normal. Technically difficult study. Poor acoustic   windows.       Chronic atrial fibrillation (HCC) 01/05/2013     Priority: Medium        Past Medical/Surgical History:  Past Medical History:   Diagnosis Date     A-fib (H) 2011    on coumadin since 1/13     Asthma     as a kid     Chest pain 2/1/2017     Chronic anticoagulation for a-fib 2/15/2013    INR's followed by coumadin clinic at   "    Diabetes mellitus (H) 2012     Diastolic heart failure 2/15/2013     Dilated cardiomyopathy (H) 1/8/2013     HTN (hypertension)      Hyperthyroidism     [place], s/p I131 1/13, now on prednisone and methimazole     Morbid obesity (H)      Pulmonary embolism (H) 1/12    hospitalized in [place], on lovenox/coumadin for a few months but stopped, hypercoag w/u neg per pt     Sleep apnea     using CPAP     History reviewed. No pertinent surgical history.        Physical Examination:  Vitals: /90  Pulse 86  Resp 16  Ht 1.702 m (5' 7.01\")  Wt (!) 220 kg (485 lb)  SpO2 99%  BMI 75.94 kg/m2  BMI= Body mass index is 75.94 kg/(m^2).         [place] Total Score 11/29/2018   Total score - [place] 7       GENERAL APPEARANCE: healthy, alert and moderate distress    Assessment/Plan:  1. Leo: severe in setting of heart disease and morbid obesity: bmi is up by 17#, maybe hypoxic with treatment not working well.  To see Iesha Francis and develop a plan to address her equipment concerns.  2. Morbid obesity: wt is up, will address in follow up.  Dr. Alcala has been addressing as well  3. BP up:  4. Sleep habits: need download and a working device to move forward with separation of wake from sleep habits.      Will review visit with Iesha Francis.  Follow up pending response and via my chart for next steps.    Time spent with patient is 25 minutes, of which >50% was spent in counseling, education and coordination of care.          "

## 2018-11-29 NOTE — Clinical Note
Download in 2 wks (I know you are thinking why not synopsis-no not just synopsis, need to see daily details!!)  Thanks Oh, lots of aerophagia lately!  Do you think it is more frequent when fface masks are encouraged?  YOCASTA Garcia, CNP-BC Sleep Medicine

## 2018-11-29 NOTE — PATIENT INSTRUCTIONS
Bartolome Julian for evaluation of concerns with cpap performance    Follow up pending device evaluation  Your BMI is Body mass index is 75.94 kg/(m^2).  Weight management is a personal decision.  If you are interested in exploring weight loss strategies, the following discussion covers the approaches that may be successful. Body mass index (BMI) is one way to tell whether you are at a healthy weight, overweight, or obese. It measures your weight in relation to your height.  A BMI of 18.5 to 24.9 is in the healthy range. A person with a BMI of 25 to 29.9 is considered overweight, and someone with a BMI of 30 or greater is considered obese. More than two-thirds of American adults are considered overweight or obese.  Being overweight or obese increases the risk for further weight gain. Excess weight may lead to heart disease and diabetes.  Creating and following plans for healthy eating and physical activity may help you improve your health.  Weight control is part of healthy lifestyle and includes exercise, emotional health, and healthy eating habits. Careful eating habits lifelong are the mainstay of weight control. Though there are significant health benefits from weight loss, long-term weight loss with diet alone may be very difficult to achieve- studies show long-term success with dietary management in less than 10% of people. Attaining a healthy weight may be especially difficult to achieve in those with severe obesity. In some cases, medications, devices and surgical management might be considered.  What can you do?  If you are overweight or obese and are interested in methods for weight loss, you should discuss this with your provider.     Consider reducing daily calorie intake by 500 calories.     Keep a food journal.     Avoiding skipping meals, consider cutting portions instead.    Diet combined with exercise helps maintain muscle while optimizing fat loss. Strength training is particularly important for building  and maintaining muscle mass. Exercise helps reduce stress, increase energy, and improves fitness. Increasing exercise without diet control, however, may not burn enough calories to loose weight.       Start walking three days a week 10-20 minutes at a time    Work towards walking thirty minutes five days a week     Eventually, increase the speed of your walking for 1-2 minutes at time    In addition, we recommend that you review healthy lifestyles and methods for weight loss available through the National Institutes of Health patient information sites:  http://win.niddk.nih.gov/publications/index.htm    And look into health and wellness programs that may be available through your health insurance provider, employer, local community center, or james [employer].    Weight management plan: Patient was referred to their PCP to discuss a diet and exercise plan.     Your blood pressure was checked while you were in clinic today.  Please read the guidelines below about what these numbers mean and what you should do about them.  Your systolic blood pressure is the top number.  This is the pressure when the heart is pumping.  Your diastolic blood pressure is the bottom number.  This is the pressure in between beats.  If your systolic blood pressure is less than 120 and your diastolic blood pressure is less than 80, then your blood pressure is normal. There is nothing more that you need to do about it  If your systolic blood pressure is 120-139 or your diastolic blood pressure is 80-89, your blood pressure may be higher than it should be.  You should have your blood pressure re-checked within a year by a primary care provider.  If your systolic blood pressure is 140 or greater or your diastolic blood pressure is 90 or greater, you may have high blood pressure.  High blood pressure is treatable, but if left untreated over time it can put you at risk for heart attack, stroke, or kidney failure.  You should have your blood pressure  re-checked by a primary care provider within the next four weeks.

## 2018-11-30 ENCOUNTER — OFFICE VISIT (OUTPATIENT)
Dept: SURGERY | Facility: CLINIC | Age: 43
End: 2018-11-30
Payer: MEDICARE

## 2018-11-30 ENCOUNTER — ANTICOAGULATION THERAPY VISIT (OUTPATIENT)
Dept: ANTICOAGULATION | Facility: CLINIC | Age: 43
End: 2018-11-30

## 2018-11-30 VITALS — BODY MASS INDEX: 75.99 KG/M2 | WEIGHT: 293 LBS

## 2018-11-30 DIAGNOSIS — Z79.01 LONG TERM CURRENT USE OF ANTICOAGULANT THERAPY: ICD-10-CM

## 2018-11-30 DIAGNOSIS — I48.91 ATRIAL FIBRILLATION (H): ICD-10-CM

## 2018-11-30 DIAGNOSIS — I48.91 ATRIAL FIBRILLATION, UNSPECIFIED TYPE (H): ICD-10-CM

## 2018-11-30 LAB — INR PPP: 3.24 (ref 0.86–1.14)

## 2018-11-30 RX ORDER — HYDRALAZINE HYDROCHLORIDE 25 MG/1
TABLET, FILM COATED ORAL
Qty: 540 TABLET | Refills: 3 | Status: SHIPPED | OUTPATIENT
Start: 2018-11-30 | End: 2018-12-30

## 2018-11-30 NOTE — MR AVS SNAPSHOT
Pricilla Brown   11/30/2018   Anticoagulation Therapy Visit    Description:  43 year old female   Provider:  Luis Daniel Burgos RN   Department:  [facility]           INR as of 11/30/2018     Today's INR 3.24!      Anticoagulation Summary as of 11/30/2018     INR goal 2.0-2.5   Today's INR 3.24!   Full warfarin instructions 11/30: 10 mg; Otherwise 20 mg on Fri; 15 mg all other days   Next INR check 12/7/2018    Indications   Atrial fibrillation (H) [I48.91] [I48.91]  Long-term (current) use of anticoagulants [Z79.01] [Z79.01]         November 2018 Details    Sun Mon Tue Wed Thu Fri Sat         1               2               3                 4               5               6               7               8               9               10                 11               12               13               14               15               16               17                 18               19               20               21               22               23               24                 25               26               27               28               29               30      10 mg   See details        Date Details   11/30 This INR check               How to take your warfarin dose     To take:  10 mg Take 2 of the 5 mg tablets.           December 2018 Details    Sun Mon Tue Wed Thu Fri Sat           1      15 mg           2      15 mg         3      15 mg         4      15 mg         5      15 mg         6      15 mg         7            8                 9               10               11               12               13               14               15                 16               17               18               19               20               21               22                 23               24               25               26               27               28               29                 30               31                     Date Details   No additional details    Date  of next INR:  12/7/2018         How to take your warfarin dose     To take:  15 mg Take 3 of the 5 mg tablets.    To take:  20 mg Take 4 of the 5 mg tablets.

## 2018-11-30 NOTE — LETTER
"11/30/2018       RE: Pricilla Brown  [address]  [facility] [address]     Dear Colleague,    Thank you for referring your patient, Pricilla Brown, to the [facility] SURGICAL WEIGHT MANAGEMENT at [facility]. Please see a copy of my visit note below.      Bariatric Nutrition Consultation Note    Reason For Visit: Nutrition Reassessment    Pricilla Brown is a 43 year-old (type 2 DM on insulin sliding scale) presenting today for bariatric nutrition consult.  Pt is interested in laparoscopic sleeve gastrectomy.  Pt has met all required RD visits prior to surgery.  Pt was referred by NANETTE Kerr and Dr. Silva Damon.    Patient signed ABN 9/20/18-9/20/19.    Coordination Notes:,   5/29/18: In light of it being several months before anticipating surgery, writer will revisit task list when Pt is closer to weight loss goal.  Writer will continue to encourage Pt to come at least monthly to see RD.    ANTHROPOMETRICS:  Ht: 67\"  Initial Wt: 462.6 lbs with BMI of 72.6  Current Wt: 485.3 lbs (+11.2 lbs over the past month; +22.7 lbs from initial weight)     Required weight loss goal pre-op: -46 lbs from initial consult weight (goal weight 416.6 lbs or less before surgery)    NUTRITION HISTORY:  Food Allergies/Intolerances: none known  Cravings: sweets, chips, pop (diet)  Triggers/cues causing extra eating: boredom (currently on disability, not working)  *Pt is on topiramate per Dr. Silva Damon who would like Pt to re-try modified liquid diet with 2 shakes/day.    Recent food recall:  Breakfast: protein shake  Lunch: meatloaf and potatoes, carrots and corn and mixed canned fruit, dinner roll, 1% milk 8 oz  Dinner: 2 brats with two slices of bread  Snack: popcorn and apple, and beef jerky(handful)    Progress with Previous Goals:  Relating To Eating:  -Follow the Modified Liquid Diet for weight loss:  Started using one shake per day starting yesterday  Breakfast: " "Protein Shake (160-250 Jagjit, 20-35 g protein)  Lunch: 3 oz lean protein (chicken/turkey breast or fish)  + non-starchy vegetables (non-starchy veg: green leafy vegetables, cucumbers, broccoli, cauliflower, green string beans)  Supper: 3 oz lean protein (chicken/turkey breast or fish)  + non-starchy vegetables (non-starchy veg: green leafy vegetables, cucumbers, broccoli, cauliflower, green string beans)  Snack: 1 protein bar + non-starchy vegetables (no calorie-containing dips/condiments)  Beverages: at least 48-64 oz water between meals daily (Powerade Zero or Propel Zero or Crystal Light or [place], less than 5 Calories per serving)    *Protein Shake Criteria: no more than 250 Calories, at least 20 grams of protein, and less than 10 grams of sugar     Frozen Meal Replacements  Healthy Choice  Lean Cuisine  Atkins Meals  Smart Ones    -Continue eating slowly (20-30 minutes per meal), chewing foods well (25 chews per bite/applesauce consistency) met/continues \"I got that down pat\"    -Increase exercise as able.  (Pool therapy, walking, etc) continue to walk but not as much due to retaining water and hard to breath 2-3 times per week 5-10 minutes    -Continue to work on quitting smoking. 1-3 cigarettes per day, feels she does not need the cigarettes anymore, has a \"nicotine inhaler\"    NUTRITION DIAGNOSIS:  Obesity r/t long history of self-monitoring deficit and excessive energy intake aeb BMI >30.- continues    INTERVENTION:  Intervention Provided/Education Provided:  Patient reported eating more sweets and calorie containing beverages over the past month due to Thanksgiving.  She continues to walk but not as often.  Patient reported that she has decreased the amount of cigarettes she smokes eat day.  Gave encouragement and support.  Provided Pt with list of goals and task list.      Note: Pt stated she did not purchase clinic pharmaceutical grade meal replacements due to financial challenges.   10/30/18- patient was " interested in review price information with her significant other at home.    Patient Understanding: good  Expected Compliance: good      GOALS:  Relating To Eating:  -Follow the Modified Liquid Diet for weight loss:    Breakfast: Protein Shake (160-250 Jagjit, 20-35 g protein)  Lunch: 3 oz lean protein (chicken/turkey breast or fish)  + non-starchy vegetables (non-starchy veg: green leafy vegetables, cucumbers, broccoli, cauliflower, green string beans)  Supper: 3 oz lean protein (chicken/turkey breast or fish)  + non-starchy vegetables (non-starchy veg: green leafy vegetables, cucumbers, broccoli, cauliflower, green string beans)  Snack: 1 protein bar + non-starchy vegetables (no calorie-containing dips/condiments)  Beverages: at least 48-64 oz water between meals daily (Powerade Zero or Propel Zero or Crystal Light or [place], less than 5 Calories per serving)    *Protein Shake Criteria: no more than 250 Calories, at least 20 grams of protein, and less than 10 grams of sugar     Frozen Meal Replacements  Healthy Choice  Lean Cuisine  Atkins Meals  Smart Ones    -Continue eating slowly (20-30 minutes per meal), chewing foods well (25 chews per bite/applesauce consistency)    -Increase exercise as able.  (Pool therapy, walking, etc)    -Continue to work on quitting smoking.       Follow-Up: follow-up with RD in 1 month.      Time spent with patient: 15 minutes     Velvet Mcallister RD, LD

## 2018-11-30 NOTE — PROGRESS NOTES
ANTICOAGULATION FOLLOW-UP CLINIC VISIT    Patient Name:  Pricilla Brown  Date:  11/30/2018  Contact Type:  Telephone    SUBJECTIVE:     Patient Findings     Positives No Problem Findings    Comments Left message for patient.  Will check an INR in one week.  Recommended Pricilla take a one time dose of 10mg today, then resume maintenance dose.           OBJECTIVE    INR   Date Value Ref Range Status   11/30/2018 3.24 (H) 0.86 - 1.14 Final     Chromogenic Factor 10   Date Value Ref Range Status   04/08/2017 19 (L) 70 - 130 % Final     Comment:     Therapeutic Range:  A Chromogenic Factor 10 level of approximately 20-40%   inversely correlates with an INR of 2-3 for patients receiving Warfarin.   Chromogenic Factor 10 levels below 20% indicate an INR greater than 3 and   levels above 40% indicate an INR less than 2.         ASSESSMENT / PLAN  INR assessment SUPRA    Recheck INR In: 1 WEEK    INR Location Clinic      Anticoagulation Summary as of 11/30/2018     INR goal 2.0-2.5   Today's INR 3.24!   Warfarin maintenance plan 20 mg (5 mg x 4) on Fri; 15 mg (5 mg x 3) all other days   Full warfarin instructions 11/30: 10 mg; Otherwise 20 mg on Fri; 15 mg all other days   Weekly warfarin total 110 mg   Plan last modified Alecia Jacobson RN (10/24/2018)   Next INR check 12/7/2018   Priority INR   Target end date Indefinite    Indications   Atrial fibrillation (H) [I48.91] [I48.91]  Long-term (current) use of anticoagulants [Z79.01] [Z79.01]         Anticoagulation Episode Summary     INR check location Clinic Lab    Preferred lab     Send INR reminders to [facility]    Comments Contact Patient at [phone]      Anticoagulation Care Providers     Provider Role Specialty Phone number    Percy Alcala MD  Cardiology [phone]            See the Encounter Report to view Anticoagulation Flowsheet and Dosing Calendar (Go to Encounters tab in chart review, and find the Anticoagulation Therapy  Visit)    Left message for patient with results and dosing recommendations. Asked patient to call back to report any missed doses, falls, signs and symptoms of bleeding or clotting, any changes in health, medication, or diet. Asked patient to call back with any questions or concerns.    Luis Dainel Burgos RN             11/30/18 Addendum:  Pricilla called back.  She has been taking warfarin 20 mg WF and 15 mg all other days instead of what was recommended:  20 mg F and 15 mg all other days of the week.  She will decrease dose today, then go back to 20 mg F and 15 all other days.  Velvet Khan RN

## 2018-11-30 NOTE — PROGRESS NOTES
"  Bariatric Nutrition Consultation Note    Reason For Visit: Nutrition Reassessment    Pricilla Brown is a 43 year-old (type 2 DM on insulin sliding scale) presenting today for bariatric nutrition consult.  Pt is interested in laparoscopic sleeve gastrectomy.  Pt has met all required RD visits prior to surgery.  Pt was referred by NANETTE Kerr and Dr. Silva Damon.    Patient signed ABN 9/20/18-9/20/19.    Coordination Notes:,   5/29/18: In light of it being several months before anticipating surgery, writer will revisit task list when Pt is closer to weight loss goal.  Writer will continue to encourage Pt to come at least monthly to see RD.    ANTHROPOMETRICS:  Ht: 67\"  Initial Wt: 462.6 lbs with BMI of 72.6  Current Wt: 485.3 lbs (+11.2 lbs over the past month; +22.7 lbs from initial weight)     Required weight loss goal pre-op: -46 lbs from initial consult weight (goal weight 416.6 lbs or less before surgery)    NUTRITION HISTORY:  Food Allergies/Intolerances: none known  Cravings: sweets, chips, pop (diet)  Triggers/cues causing extra eating: boredom (currently on disability, not working)  *Pt is on topiramate per Dr. Silva Damon who would like Pt to re-try modified liquid diet with 2 shakes/day.    Recent food recall:  Breakfast: protein shake  Lunch: meatloaf and potatoes, carrots and corn and mixed canned fruit, dinner roll, 1% milk 8 oz  Dinner: 2 brats with two slices of bread  Snack: popcorn and apple, and beef jerky(handful)    Progress with Previous Goals:  Relating To Eating:  -Follow the Modified Liquid Diet for weight loss:  Started using one shake per day starting yesterday  Breakfast: Protein Shake (160-250 Jagjit, 20-35 g protein)  Lunch: 3 oz lean protein (chicken/turkey breast or fish)  + non-starchy vegetables (non-starchy veg: green leafy vegetables, cucumbers, broccoli, cauliflower, green string beans)  Supper: 3 oz lean protein (chicken/turkey breast or fish)  + " "non-starchy vegetables (non-starchy veg: green leafy vegetables, cucumbers, broccoli, cauliflower, green string beans)  Snack: 1 protein bar + non-starchy vegetables (no calorie-containing dips/condiments)  Beverages: at least 48-64 oz water between meals daily (Powerade Zero or Propel Zero or Crystal Light or Isael, less than 5 Calories per serving)    *Protein Shake Criteria: no more than 250 Calories, at least 20 grams of protein, and less than 10 grams of sugar     Frozen Meal Replacements  Healthy Choice  Lean Cuisine  Atkins Meals  Smart Ones    -Continue eating slowly (20-30 minutes per meal), chewing foods well (25 chews per bite/applesauce consistency) met/continues \"I got that down pat\"    -Increase exercise as able.  (Pool therapy, walking, etc) continue to walk but not as much due to retaining water and hard to breath 2-3 times per week 5-10 minutes    -Continue to work on quitting smoking. 1-3 cigarettes per day, feels she does not need the cigarettes anymore, has a \"nicotine inhaler\"    NUTRITION DIAGNOSIS:  Obesity r/t long history of self-monitoring deficit and excessive energy intake aeb BMI >30.- continues    INTERVENTION:  Intervention Provided/Education Provided:  Patient reported eating more sweets and calorie containing beverages over the past month due to Thanksgiving.  She continues to walk but not as often.  Patient reported that she has decreased the amount of cigarettes she smokes eat day.  Gave encouragement and support.  Provided Pt with list of goals and task list.      Note: Pt stated she did not purchase clinic pharmaceutical grade meal replacements due to financial challenges.   10/30/18- patient was interested in review price information with her significant other at home.    Patient Understanding: good  Expected Compliance: good      GOALS:  Relating To Eating:  -Follow the Modified Liquid Diet for weight loss:    Breakfast: Protein Shake (160-250 Jagjit, 20-35 g protein)  Lunch: 3 oz " lean protein (chicken/turkey breast or fish)  + non-starchy vegetables (non-starchy veg: green leafy vegetables, cucumbers, broccoli, cauliflower, green string beans)  Supper: 3 oz lean protein (chicken/turkey breast or fish)  + non-starchy vegetables (non-starchy veg: green leafy vegetables, cucumbers, broccoli, cauliflower, green string beans)  Snack: 1 protein bar + non-starchy vegetables (no calorie-containing dips/condiments)  Beverages: at least 48-64 oz water between meals daily (Powerade Zero or Propel Zero or Crystal Light or [place], less than 5 Calories per serving)    *Protein Shake Criteria: no more than 250 Calories, at least 20 grams of protein, and less than 10 grams of sugar     Frozen Meal Replacements  Healthy Choice  Lean Cuisine  Atkins Meals  Smart Ones    -Continue eating slowly (20-30 minutes per meal), chewing foods well (25 chews per bite/applesauce consistency)    -Increase exercise as able.  (Pool therapy, walking, etc)    -Continue to work on quitting smoking.       Follow-Up: follow-up with RD in 1 month.      Time spent with patient: 15 minutes     Velvet Mcallister RD, MARLA

## 2018-11-30 NOTE — PATIENT INSTRUCTIONS
GOALS:  Relating To Eating:  -Follow the Modified Liquid Diet for weight loss:    Breakfast: Protein Shake (160-250 Jagjit, 20-35 g protein)  Lunch: 3 oz lean protein (chicken/turkey breast or fish)  + non-starchy vegetables (non-starchy veg: green leafy vegetables, cucumbers, broccoli, cauliflower, green string beans)  Supper: 3 oz lean protein (chicken/turkey breast or fish)  + non-starchy vegetables (non-starchy veg: green leafy vegetables, cucumbers, broccoli, cauliflower, green string beans)  Snack: 1 protein bar + non-starchy vegetables (no calorie-containing dips/condiments)  Beverages: at least 48-64 oz water between meals daily (Powerade Zero or Propel Zero or Crystal Light or Isael, less than 5 Calories per serving)    *Protein Shake Criteria: no more than 250 Calories, at least 20 grams of protein, and less than 10 grams of sugar     Frozen Meal Replacements  Healthy Choice  Lean Cuisine  Atkins Meals  Smart Ones    -Continue eating slowly (20-30 minutes per meal), chewing foods well (25 chews per bite/applesauce consistency)    -Increase exercise as able.  (Pool therapy, walking, etc)    -Continue to work on quitting smoking.     Follow up with RD in one month    Velvet Mcallister RD, LD  If you need to schedule or reschedule with a dietitian please call [phone].

## 2018-11-30 NOTE — MR AVS SNAPSHOT
MRN:[id number]                      After Visit Summary   11/30/2018    Pricilla Brown    MRN: [id number]           Visit Information        Provider Department      11/30/2018 9:30 AM Velvet Mcallister RD [facility] Surgical Weight Management        Your next 10 appointments already scheduled     Dec 03, 2018 12:30 PM CST   (Arrive by 12:15 PM)   Return Weight Management Visit with SHANE Murcia [facility] Medical Weight Management ([facility])    [address]  4th Floor  [facility] [address]   [phone]            Dec 07, 2018  3:15 PM CST   Pool Treatment with Chantal Chavis, PT   [facility] Physical Therapy ([facility])    [address]  [address]  [place] [address]   [phone]            Dec 12, 2018  1:00 PM CST   Pool Treatment with Chantal Chavis, PT   [facility] Physical Therapy ([facility])    [address]  [address]  [facility] [address]   [phone]            Dec 14, 2018 11:30 AM CST   (Arrive by 11:15 AM)   RETURN DIABETES with SHANE Rojas [facility] Endocrinology ([facility])    [address]  3rd [address]  [facility] [address]   [phone]            Dec 17, 2018 11:30 AM CST   Lab with  LAB   [facility] Lab ([facility])    [address]  1st [address]  [facility] [address]   [phone]            Dec 17, 2018 12:00 PM CST   (Arrive by 11:45 AM)   CORE RETURN with Ximena Carpenter NP   [facility] Heart Care ([facility])    [address]  [address]  [facility] [address]   [phone]            Dec 19, 2018  1:00 PM CST   Pool Treatment with Chantal Chavis, PT   [facility] ([facility])    [address]  [address]  [facility] [address]   [phone]            Dec  21, 2018  1:45 PM CST   Pool Treatment with Chantal Patten Suzanna Chavis, PT   [facility] Physical Therapy ([facility])    [address] [place] [address] [address]  [place] [address]   [phone]            Dec 28, 2018  2:30 PM CST   Pool Treatment with Chantal Patten Suzanna Chavis, PT   [facility] Physical Therapy ([facility])    [address] [place] [address] [address]  [place] [address]   [phone]            Jan 04, 2019  1:00 PM CST   Pool Treatment with Chantal Patten Suzanna Chavis, PT   [facility] Physical Therapy ([facility])    [address] [place] [address] [address]  [place] [address]   [phone]              Care Instructions    GOALS:  Relating To Eating:  -Follow the Modified Liquid Diet for weight loss:    Breakfast: Protein Shake (160-250 Jagjit, 20-35 g protein)  Lunch: 3 oz lean protein (chicken/turkey breast or fish)  + non-starchy vegetables (non-starchy veg: green leafy vegetables, cucumbers, broccoli, cauliflower, green string beans)  Supper: 3 oz lean protein (chicken/turkey breast or fish)  + non-starchy vegetables (non-starchy veg: green leafy vegetables, cucumbers, broccoli, cauliflower, green string beans)  Snack: 1 protein bar + non-starchy vegetables (no calorie-containing dips/condiments)  Beverages: at least 48-64 oz water between meals daily (Powerade Zero or Propel Zero or Crystal Light or [place], less than 5 Calories per serving)    *Protein Shake Criteria: no more than 250 Calories, at least 20 grams of protein, and less than 10 grams of sugar     Frozen Meal Replacements  Healthy Choice  Lean Cuisine  Atkins Meals  Smart Ones    -Continue eating slowly (20-30 minutes per meal), chewing foods well (25 chews per bite/applesauce consistency)    -Increase exercise as able.  (Pool therapy, walking, etc)    -Continue to work on quitting smoking.     Follow up with RD in one month    Velvet Mcallister RD, LD  If you need to schedule or reschedule with  a dietitian please call [phone].          [employer] Information     [employer] gives you secure access to your electronic health record. If you see a primary care provider, you can also send messages to your care team and make appointments. If you have questions, please call your primary care clinic.  If you do not have a primary care provider, please call [phone] and they will assist you.      [employer] is an electronic gateway that provides easy, online access to your medical records. With [employer], you can request a clinic appointment, read your test results, renew a prescription or communicate with your care team.     To access your existing account, please contact your [facility] or call [phone] for assistance.        Care EveryWhere ID     This is your Care EveryWhere ID. This could be used by other organizations to access your [place] medical records  [id number]        Equal Access to Services     KIERSTEN DRIVER : Maryam Woodward, malinda campos, bola weber. So [facility] [phone].    ATENCIÓN: Si habla español, tiene a de la rosa disposición servicios gratuitos de asistencia lingüística. Llame al [phone].    We comply with applicable federal civil rights laws and [place] laws. We do not discriminate on the basis of race, color, national origin, age, disability, sex, sexual orientation, or gender identity.

## 2018-12-03 VITALS
HEIGHT: 67 IN | SYSTOLIC BLOOD PRESSURE: 126 MMHG | RESPIRATION RATE: 16 BRPM | BODY MASS INDEX: 45.99 KG/M2 | OXYGEN SATURATION: 99 % | DIASTOLIC BLOOD PRESSURE: 76 MMHG | HEART RATE: 86 BPM | WEIGHT: 293 LBS

## 2018-12-05 DIAGNOSIS — I50.23 ACUTE ON CHRONIC SYSTOLIC HEART FAILURE (H): ICD-10-CM

## 2018-12-05 RX ORDER — METOLAZONE 2.5 MG/1
2.5 TABLET ORAL 2 TIMES DAILY
Qty: 60 TABLET | Refills: 1 | Status: SHIPPED | OUTPATIENT
Start: 2018-12-05 | End: 2018-12-30

## 2018-12-07 DIAGNOSIS — Z79.4 TYPE 2 DIABETES MELLITUS WITH HYPERGLYCEMIA, WITH LONG-TERM CURRENT USE OF INSULIN (H): ICD-10-CM

## 2018-12-07 DIAGNOSIS — E11.65 TYPE 2 DIABETES MELLITUS WITH HYPERGLYCEMIA, WITH LONG-TERM CURRENT USE OF INSULIN (H): ICD-10-CM

## 2018-12-10 NOTE — TELEPHONE ENCOUNTER
gabapentin (NEURONTIN) 300 MG capsule      Last Written Prescription Date:  7/18/18  Last Fill Quantity: 120,   # refills: 0  Last Office Visit : 10/11/18  Future Office visit:  12/14/18    Routing refill request to provider for review/approval because:  Drug not on the refill protocol.

## 2018-12-11 RX ORDER — GABAPENTIN 300 MG/1
600 CAPSULE ORAL 2 TIMES DAILY
Qty: 120 CAPSULE | Refills: 0 | Status: SHIPPED | OUTPATIENT
Start: 2018-12-11 | End: 2018-12-30

## 2018-12-12 ENCOUNTER — PRE VISIT (OUTPATIENT)
Dept: CARDIOLOGY | Facility: CLINIC | Age: 43
End: 2018-12-12

## 2018-12-12 DIAGNOSIS — I50.22 CHRONIC SYSTOLIC HEART FAILURE (H): Primary | ICD-10-CM

## 2018-12-30 ENCOUNTER — MYC REFILL (OUTPATIENT)
Dept: ENDOCRINOLOGY | Facility: CLINIC | Age: 43
End: 2018-12-30

## 2018-12-30 ENCOUNTER — MYC REFILL (OUTPATIENT)
Dept: INTERNAL MEDICINE | Facility: CLINIC | Age: 43
End: 2018-12-30

## 2018-12-30 ENCOUNTER — MYC REFILL (OUTPATIENT)
Dept: CARDIOLOGY | Facility: CLINIC | Age: 43
End: 2018-12-30

## 2018-12-30 DIAGNOSIS — B35.1 DERMATOPHYTOSIS OF NAIL: ICD-10-CM

## 2018-12-30 DIAGNOSIS — I50.32 CHRONIC DIASTOLIC HEART FAILURE (H): ICD-10-CM

## 2018-12-30 DIAGNOSIS — I48.91 ATRIAL FIBRILLATION, UNSPECIFIED TYPE (H): ICD-10-CM

## 2018-12-30 DIAGNOSIS — L02.221 FURUNCLE OF ABDOMINAL WALL: ICD-10-CM

## 2018-12-30 DIAGNOSIS — E11.65 TYPE 2 DIABETES MELLITUS WITH HYPERGLYCEMIA, WITH LONG-TERM CURRENT USE OF INSULIN (H): ICD-10-CM

## 2018-12-30 DIAGNOSIS — M79.2 NEUROPATHIC PAIN OF LOWER EXTREMITY, UNSPECIFIED LATERALITY: ICD-10-CM

## 2018-12-30 DIAGNOSIS — I42.0 DILATED CARDIOMYOPATHY (H): ICD-10-CM

## 2018-12-30 DIAGNOSIS — Z72.0 TOBACCO ABUSE: ICD-10-CM

## 2018-12-30 DIAGNOSIS — B35.3 TINEA PEDIS OF BOTH FEET: ICD-10-CM

## 2018-12-30 DIAGNOSIS — M10.9 ACUTE GOUTY ARTHRITIS: ICD-10-CM

## 2018-12-30 DIAGNOSIS — Z79.4 TYPE 2 DIABETES MELLITUS WITH HYPERGLYCEMIA, WITH LONG-TERM CURRENT USE OF INSULIN (H): ICD-10-CM

## 2018-12-30 DIAGNOSIS — B35.1 ONYCHOMYCOSIS: ICD-10-CM

## 2018-12-30 DIAGNOSIS — E87.6 HYPOKALEMIA: ICD-10-CM

## 2018-12-30 DIAGNOSIS — I50.23 ACUTE ON CHRONIC SYSTOLIC HEART FAILURE (H): ICD-10-CM

## 2018-12-30 DIAGNOSIS — E11.9 DIABETES MELLITUS, TYPE 2 (H): ICD-10-CM

## 2018-12-31 DIAGNOSIS — E03.8 OTHER SPECIFIED HYPOTHYROIDISM: ICD-10-CM

## 2018-12-31 RX ORDER — COLCHICINE 0.6 MG/1
.6-1.2 TABLET ORAL DAILY PRN
Qty: 180 TABLET | Refills: 0 | Status: SHIPPED | OUTPATIENT
Start: 2018-12-31 | End: 2019-04-11

## 2018-12-31 RX ORDER — NICOTINE 21 MG/24HR
1 PATCH, TRANSDERMAL 24 HOURS TRANSDERMAL EVERY 24 HOURS
Qty: 90 PATCH | Refills: 2 | Status: SHIPPED | OUTPATIENT
Start: 2018-12-31 | End: 2019-07-23

## 2018-12-31 RX ORDER — LEVOTHYROXINE SODIUM 200 UG/1
TABLET ORAL
Qty: 102 TABLET | Refills: 0 | Status: SHIPPED | OUTPATIENT
Start: 2018-12-31 | End: 2019-04-11

## 2018-12-31 RX ORDER — BUPROPION HYDROCHLORIDE 100 MG/1
100 TABLET, EXTENDED RELEASE ORAL 2 TIMES DAILY
Qty: 180 TABLET | Refills: 2 | Status: SHIPPED | OUTPATIENT
Start: 2018-12-31 | End: 2019-07-24

## 2018-12-31 RX ORDER — ACETAMINOPHEN 325 MG/1
650 TABLET ORAL 3 TIMES DAILY PRN
Qty: 180 TABLET | Refills: 5 | Status: SHIPPED | OUTPATIENT
Start: 2018-12-31 | End: 2019-04-15

## 2018-12-31 NOTE — TELEPHONE ENCOUNTER
Last Clinic Visit: 9/20/2018  [facility]  Overdue for labs - clinic notified

## 2019-01-01 RX ORDER — GABAPENTIN 300 MG/1
600 CAPSULE ORAL 2 TIMES DAILY
Qty: 120 CAPSULE | Refills: 0 | Status: SHIPPED | OUTPATIENT
Start: 2019-01-01 | End: 2019-04-11

## 2019-01-01 RX ORDER — CAPSAICIN 0.025 %
1 CREAM (GRAM) TOPICAL 3 TIMES DAILY PRN
Qty: 1 TUBE | Refills: 0 | Status: SHIPPED | OUTPATIENT
Start: 2019-01-01 | End: 2019-04-11

## 2019-01-02 RX ORDER — TORSEMIDE 100 MG/1
100 TABLET ORAL 2 TIMES DAILY
Qty: 180 TABLET | Refills: 1 | Status: SHIPPED | OUTPATIENT
Start: 2019-01-02 | End: 2019-07-23

## 2019-01-02 RX ORDER — POTASSIUM CHLORIDE 1500 MG/1
40 TABLET, EXTENDED RELEASE ORAL 4 TIMES DAILY
Qty: 240 TABLET | Refills: 11 | Status: SHIPPED | OUTPATIENT
Start: 2019-01-02 | End: 2020-03-24

## 2019-01-02 RX ORDER — NYSTATIN 100000 U/G
CREAM TOPICAL 2 TIMES DAILY
Qty: 90 G | Refills: 6 | Status: SHIPPED | OUTPATIENT
Start: 2019-01-02 | End: 2019-04-11

## 2019-01-02 RX ORDER — HYDRALAZINE HYDROCHLORIDE 25 MG/1
TABLET, FILM COATED ORAL
Qty: 540 TABLET | Refills: 3 | Status: SHIPPED | OUTPATIENT
Start: 2019-01-02 | End: 2019-07-23

## 2019-01-02 RX ORDER — METOLAZONE 2.5 MG/1
2.5 TABLET ORAL 2 TIMES DAILY
Qty: 60 TABLET | Refills: 1 | Status: SHIPPED | OUTPATIENT
Start: 2019-01-02 | End: 2019-04-11

## 2019-01-02 RX ORDER — WARFARIN SODIUM 5 MG/1
TABLET ORAL
Qty: 170 TABLET | Refills: 1 | Status: SHIPPED | OUTPATIENT
Start: 2019-01-02 | End: 2020-04-09 | Stop reason: SINTOL

## 2019-01-02 RX ORDER — METOPROLOL SUCCINATE 100 MG/1
100 TABLET, EXTENDED RELEASE ORAL DAILY
Qty: 90 TABLET | Refills: 3 | Status: SHIPPED | OUTPATIENT
Start: 2019-01-02 | End: 2019-01-07

## 2019-01-02 RX ORDER — CICLOPIROX OLAMINE 7.7 MG/G
CREAM TOPICAL 2 TIMES DAILY
Qty: 90 G | Refills: 6 | Status: SHIPPED | OUTPATIENT
Start: 2019-01-02 | End: 2019-04-11

## 2019-01-03 RX ORDER — CLOTRIMAZOLE 1 %
CREAM (GRAM) TOPICAL 2 TIMES DAILY PRN
Qty: 60 G | Refills: 1 | Status: SHIPPED | OUTPATIENT
Start: 2019-01-03 | End: 2019-07-24

## 2019-01-04 DIAGNOSIS — I50.32 CHRONIC DIASTOLIC HEART FAILURE (H): Primary | ICD-10-CM

## 2019-01-07 ENCOUNTER — OFFICE VISIT (OUTPATIENT)
Dept: CARDIOLOGY | Facility: CLINIC | Age: 44
End: 2019-01-07
Attending: INTERNAL MEDICINE
Payer: MEDICARE

## 2019-01-07 ENCOUNTER — OFFICE VISIT (OUTPATIENT)
Dept: SURGERY | Facility: CLINIC | Age: 44
End: 2019-01-07
Payer: MEDICARE

## 2019-01-07 ENCOUNTER — ANTICOAGULATION THERAPY VISIT (OUTPATIENT)
Dept: ANTICOAGULATION | Facility: CLINIC | Age: 44
End: 2019-01-07

## 2019-01-07 VITALS
DIASTOLIC BLOOD PRESSURE: 77 MMHG | OXYGEN SATURATION: 97 % | HEIGHT: 67 IN | SYSTOLIC BLOOD PRESSURE: 118 MMHG | BODY MASS INDEX: 45.99 KG/M2 | HEART RATE: 90 BPM | WEIGHT: 293 LBS

## 2019-01-07 DIAGNOSIS — I48.91 ATRIAL FIBRILLATION, UNSPECIFIED TYPE (H): ICD-10-CM

## 2019-01-07 DIAGNOSIS — I48.91 ATRIAL FIBRILLATION (H): ICD-10-CM

## 2019-01-07 DIAGNOSIS — I50.23 ACUTE ON CHRONIC SYSTOLIC HEART FAILURE (H): ICD-10-CM

## 2019-01-07 DIAGNOSIS — I50.32 CHRONIC DIASTOLIC HEART FAILURE (H): ICD-10-CM

## 2019-01-07 DIAGNOSIS — Z79.01 LONG TERM CURRENT USE OF ANTICOAGULANT THERAPY: ICD-10-CM

## 2019-01-07 LAB
ANION GAP SERPL CALCULATED.3IONS-SCNC: 7 MMOL/L (ref 3–14)
BUN SERPL-MCNC: 24 MG/DL (ref 7–30)
CALCIUM SERPL-MCNC: 8.4 MG/DL (ref 8.5–10.1)
CHLORIDE SERPL-SCNC: 106 MMOL/L (ref 94–109)
CO2 SERPL-SCNC: 26 MMOL/L (ref 20–32)
CREAT SERPL-MCNC: 1.18 MG/DL (ref 0.52–1.04)
GFR SERPL CREATININE-BSD FRML MDRD: 56 ML/MIN/{1.73_M2}
GLUCOSE SERPL-MCNC: 162 MG/DL (ref 70–99)
INR PPP: 3.7 (ref 0.86–1.14)
POTASSIUM SERPL-SCNC: 4.1 MMOL/L (ref 3.4–5.3)
SODIUM SERPL-SCNC: 138 MMOL/L (ref 133–144)

## 2019-01-07 PROCEDURE — 85610 PROTHROMBIN TIME: CPT | Performed by: NURSE PRACTITIONER

## 2019-01-07 PROCEDURE — 80048 BASIC METABOLIC PNL TOTAL CA: CPT | Performed by: NURSE PRACTITIONER

## 2019-01-07 PROCEDURE — G0463 HOSPITAL OUTPT CLINIC VISIT: HCPCS | Mod: ZF

## 2019-01-07 PROCEDURE — 99213 OFFICE O/P EST LOW 20 MIN: CPT | Mod: ZP | Performed by: NURSE PRACTITIONER

## 2019-01-07 RX ORDER — METOPROLOL SUCCINATE 100 MG/1
150 TABLET, EXTENDED RELEASE ORAL DAILY
Qty: 135 TABLET | Refills: 3 | Status: SHIPPED | OUTPATIENT
Start: 2019-01-07 | End: 2019-03-13

## 2019-01-07 ASSESSMENT — PAIN SCALES - GENERAL: PAINLEVEL: NO PAIN (0)

## 2019-01-07 ASSESSMENT — MIFFLIN-ST. JEOR: SCORE: 2928.39

## 2019-01-07 NOTE — LETTER
"1/7/2019       RE: Pricilla Brown  [address]  [facility] [place] [address]     Dear Colleague,    Thank you for referring your patient, Pricilla Brown, to the [facility] SURGICAL WEIGHT MANAGEMENT at [facility]. Please see a copy of my visit note below.      Bariatric Nutrition Consultation Note    Reason For Visit: Nutrition Reassessment    Pricilla Brown is a 43 year-old (type 2 DM on insulin sliding scale) presenting today for bariatric nutrition consult.  Pt is interested in laparoscopic sleeve gastrectomy.  Pt has met all required RD visits prior to surgery.  Pt was referred by NANETTE Kerr and Dr. Silva Damon.    Patient signed ABN 9/20/18-9/20/19.    Coordination Notes:  5/29/18: In light of it being several months before anticipating surgery, writer will revisit task list when Pt is closer to weight loss goal.  Writer will continue to encourage Pt to come at least monthly to see RD.    ANTHROPOMETRICS:  Ht: 67\"  Initial Wt: 462.6 lbs with BMI of 72.6  Current Wt: 491.4 lbs (+6.1 lbs over the past month; +28.8 lbs from initial weight)     Required weight loss goal pre-op: -46 lbs from initial consult weight (goal weight 416.6 lbs or less before surgery)    NUTRITION HISTORY:  Food Allergies/Intolerances: none known  Cravings: sweets, chips, pop (diet)  Triggers/cues causing extra eating: boredom (currently on disability, not working)  *Pt is on topiramate per Dr. Silva Damon who would like Pt to re-try modified liquid diet with 2 shakes/day.    Recent food recall:  Eating one meal and snack per day recently  Meal: italian sausage emma with macaroni and cheese and broccoli   Snack: popcorn  Beverages: water    Progress with Previous Goals:  Relating To Eating:  -Follow the Modified Liquid Diet for weight loss:  Did not follow over the past month due to being ill  Breakfast: Protein Shake (160-250 Jagjit, 20-35 g protein)  Lunch: 3 oz lean protein " (chicken/turkey breast or fish)  + non-starchy vegetables (non-starchy veg: green leafy vegetables, cucumbers, broccoli, cauliflower, green string beans)  Supper: 3 oz lean protein (chicken/turkey breast or fish)  + non-starchy vegetables (non-starchy veg: green leafy vegetables, cucumbers, broccoli, cauliflower, green string beans)  Snack: 1 protein bar + non-starchy vegetables (no calorie-containing dips/condiments)  Beverages: at least 48-64 oz water between meals daily (Powerade Zero or Propel Zero or Crystal Light or Isael, less than 5 Calories per serving)    *Protein Shake Criteria: no more than 250 Calories, at least 20 grams of protein, and less than 10 grams of sugar     Frozen Meal Replacements  Healthy Choice  Lean Cuisine  Atkins Meals  Smart Ones    -Continue eating slowly (20-30 minutes per meal), chewing foods well (25 chews per bite/applesauce consistency)- met    -Increase exercise as able.  (Pool therapy, walking, etc) not doing activity over the past week, foot is hurting more recently     -Continue to work on quitting smoking. - 5 days nicotine free    NUTRITION DIAGNOSIS:  Obesity r/t long history of self-monitoring deficit and excessive energy intake aeb BMI >30.- continues    INTERVENTION:  Intervention Provided/Education Provided:  Patient has not had a cigarette in five days.  She has been sick over the past couple weeks so she has not followed the modified liquid diet.  Pricilla has not gone to pool therapy recently.  Gave encouragement and support.  Provided Pt with list of goals and task list.      Note: Pt stated she did not purchase clinic pharmaceutical grade meal replacements due to financial challenges.   10/30/18- patient was interested in review price information with her significant other at home.    Patient Understanding: good  Expected Compliance: good      GOALS:  Relating To Eating:  -Follow the Modified Liquid Diet for weight loss:    Breakfast: Protein Shake (160-250 Jagjit, 20-35  g protein)  Lunch: 3 oz lean protein (chicken/turkey breast or fish)  + non-starchy vegetables (non-starchy veg: green leafy vegetables, cucumbers, broccoli, cauliflower, green string beans)  Supper: 3 oz lean protein (chicken/turkey breast or fish)  + non-starchy vegetables (non-starchy veg: green leafy vegetables, cucumbers, broccoli, cauliflower, green string beans)  Snack: 1 protein bar + non-starchy vegetables (no calorie-containing dips/condiments)  Beverages: at least 48-64 oz water between meals daily (Powerade Zero or Propel Zero or Crystal Light or [place], less than 5 Calories per serving)    *Protein Shake Criteria: no more than 250 Calories, at least 20 grams of protein, and less than 10 grams of sugar     Frozen Meal Replacements  Healthy Choice  Lean Cuisine  Atkins Meals  Smart Ones    -Continue eating slowly (20-30 minutes per meal), chewing foods well (25 chews per bite/applesauce consistency)    -Increase exercise as able.  (Pool therapy, walking, etc)    -Continue to work on quitting smoking.     Follow-Up: follow-up with RD in 1 month.      Time spent with patient: 15 minutes     Velvet Mcallister RD, MARLA

## 2019-01-07 NOTE — LETTER
1/7/2019      RE: Pricilla Brown  [address]  [facility] [address]       Dear Colleague,    Thank you for the opportunity to participate in the care of your patient, Pricilla Brown, at the [facility] HEART [facility]. Please see a copy of my visit note below.    HPI:    is a 43 yr old female patient followed by Dr. Alcala for dilated cardiomyopathy, presents for follow up to [facility].  She was last seen in clinic 3 months ago by Dr. Alcala when hydralazine dosing was increased. She returns for follow up.    Pricilla had a recent URI that improved over the last several days. She felt her fluids have been better controlled over the last several weeks but noted some bloating yesterday, could be related to increased fluid intake while sick. However, she hasn't felt that her evening dose of torsemide or this am have kicked in. Slept sitting upright last night, as a result with CPAP. No chest pain, + palpitations -- more than prior, some episodes of lightheadedness -- attributed to recent increase in hydralazine. No lower extremity edema, + belly bloating    PAST MEDICAL HISTORY:  Past Medical History:   Diagnosis Date     A-fib (H) 2011    on coumadin since 1/13     Asthma     as a kid     Chest pain 2/1/2017     Chronic anticoagulation for a-fib 2/15/2013    INR's followed by coumadin clinic at      Diabetes mellitus (H) 2012     Diastolic heart failure 2/15/2013     Dilated cardiomyopathy (H) 1/8/2013     HTN (hypertension)      Hyperthyroidism     Graves, s/p I131 1/13, now on prednisone and methimazole     Morbid obesity (H)      Pulmonary embolism (H) 1/12    hospitalized in [place], on lovenox/coumadin for a few months but stopped, hypercoag w/u neg per pt     Sleep apnea     using CPAP       FAMILY HISTORY:  Family History   Problem Relation Age of Onset     Thyroid Disease Mother         Grave's D     Diabetes Mother      Heart Disease Mother       Cerebrovascular Disease Mother         dec. 56 yo     Hypertension Mother      Heart Disease Sister         Heart Murmur     Diabetes Sister      Heart Disease Father         dec in his 30s, MI     Psychotic Disorder Brother         Bipolar     Diabetes Brother      Thyroid Disease Brother         Hyper Thyroid     Heart Disease Brother      Thyroid Disease Sister         Hyper Thyroid     Thyroid Disease Sister         Hyper Thyroid     Thyroid Disease Sister         Mental Health Problems     Thyroid Disease Maternal Aunt      Thyroid Disease Maternal Uncle      Cancer No family hx of      Glaucoma No family hx of      Macular Degeneration No family hx of        SOCIAL HISTORY:  Social History     Social History     Marital status: Single     Spouse name: N/A     Number of children: N/A     Years of education: N/A     Social History Main Topics     Smoking status: Light Tobacco Smoker     Packs/day: 0.25     Years: 13.00     Types: Cigarettes     Smokeless tobacco: Never Used      Comment: down to 5 cigs     Alcohol use No     Drug use: No     Sexual activity: Yes     Partners: Male     Birth control/ protection: Condom     Other Topics Concern     None     Social History Narrative    Single, no children        Gyn:        Last pap several years ago, no abnormal    No STIs            Patient is single.  She is no longer working.  She used to work in the area of customer service.  She is currently living with her sister in [place], [place].  She has no pets.  Patient has smoked a half pack of cigarettes a day for the past 10 plus years.  She states that she is down to 5 cigarettes a day with the aid of Wellbutrin.  She does not smoke cigars, pipes or chew tobacco.  She has 1 cup of coffee in the morning.  She does not drink alcohol.  Patient does not exercise.        CURRENT MEDICATIONS:  Current Outpatient Medications on File Prior to Visit:  acetaminophen (TYLENOL) 325 MG tablet Take 2 tablets (650  mg) by mouth 3 times daily as needed for mild pain or fever (total acetaminophen dose should not exceed 3000 mg per day)   albuterol (PROAIR HFA/PROVENTIL HFA/VENTOLIN HFA) 108 (90 Base) MCG/ACT inhaler Inhale 2 puffs into the lungs every 6 hours as needed   bisacodyl (DULCOLAX) 10 MG suppository Place 1 suppository (10 mg) rectally daily as needed for constipation   blood glucose (NO BRAND SPECIFIED) lancets standard USE TO CHECK  blood sugar fasting each am  prelunch and predinner daily OR AS DIRECTED Call clinic to schedule MD APPT.   blood glucose (NO BRAND SPECIFIED) test strip Use to test blood sugars 3 times daily or as directed   blood glucose monitoring (NO BRAND SPECIFIED) meter device kit Use to test blood sugar 3 times daily or as directed.   Blood Glucose Monitoring Suppl (IBG STAR) W/DEVICE KIT -PLEASE GIVE PATIENT A DEVICE HER INSURANCE WILL COVER-   buPROPion (WELLBUTRIN SR) 100 MG 12 hr tablet Take 1 tablet (100 mg) by mouth 2 times daily   capsaicin (ZOSTRIX) 0.025 % external cream Apply 1 g topically 3 times daily as needed   ciclopirox (LOPROX) 0.77 % cream Apply topically 2 times daily To feet and toenails.   ciclopirox 8 % SOLN Externally apply topically daily To toenails.   clotrimazole (LOTRIMIN) 1 % cream Apply topically 2 times daily   clotrimazole (LOTRIMIN) 1 % external cream Apply topically 2 times daily as needed (skin irritation)   colchicine (COLCYRS) 0.6 MG tablet Take 1-2 tablets (0.6-1.2 mg) by mouth daily as needed for moderate pain   diclofenac (VOLTAREN) 1 % GEL topical gel Apply 4 grams to knees or 2 grams to hands four times daily using enclosed dosing card.   dulaglutide (TRULICITY) 1.5 MG/0.5ML pen Inject 1.5 mg Subcutaneous every 7 days   DULoxetine (CYMBALTA) 20 MG capsule Take 1 capsule (20 mg) by mouth 2 times daily   Efinaconazole 10 % SOLN Externally apply topically daily To toenails.   gabapentin (NEURONTIN) 300 MG capsule Take 2 capsules (600 mg) by mouth 2 times  daily   hydrALAZINE (APRESOLINE) 25 MG tablet Take 1 tablet (25 MG) in the morning, take 2 tablets (50 MG) midday, and take 3 tablets (75 MG) in the evening.   hydrALAZINE (APRESOLINE) 25 MG tablet Take 50-75 mg by mouth Take 2 tabs (50 mg) in am, 2 tabs (50 mg) midday, and 3 tabs (75 mg) in pm daily    insulin glargine U-300 (TOUJEO) 300 UNIT/ML injection Inject 170 units SQ each am.   insulin pen needle (BD RIVER U/F) 32G X 4 MM Use 6 daily or as directed   levothyroxine (SYNTHROID/LEVOTHROID) 200 MCG tablet TAKE ONE TABLET BY MOUTH EVERY DAY MONDAY THROUGH SATURDAY AND TAKE TWO TABLETS ON SUNDAYS   metolazone (ZAROXOLYN) 2.5 MG tablet Take 1 tablet (2.5 mg) by mouth 2 times daily Take 1/2 hour prior to torsemide   metoprolol succinate ER (TOPROL-XL) 100 MG 24 hr tablet Take 1 tablet (100 mg) by mouth daily   morphine (MS CONTIN) 15 MG 12 hr tablet Take 15 mg by mouth daily as needed   nicotine ([employer] NICOTINE) 14 MG/24HR patch 2h hr Place 1 patch onto the skin every 24 hours   nicotine (NICODERM CQ) 14 MG/24HR 24 hr patch Place 1 patch onto the skin every 24 hours   nicotine (NICOTROL) 10 MG inhaler Inhale short puffs over 5-15 minutes every 1-2 hours as directed.   NOVOLOG FLEXPEN 100 UNIT/ML soln Inject 70 units with meals plus correction. Pt uses approx 200 units in 24 hrs.   nystatin (MYCOSTATIN) 134717 UNIT/GM external cream Apply topically 2 times daily To toenails   order for DME Left foot   order for DME Equipment being ordered: BI 1986-0080 $92 [id number], fasciitis, LG, night splint   order for DME Equipment being ordered: Challenger Herminia walker if available - patient needs seat, basket and brakes.   ORDER FOR DME Use your CPAP device as directed by your provider. Pressure change to min 13 max 18cwp   oxyCODONE-acetaminophen (PERCOCET) 5-325 MG per tablet Take 1 tablet by mouth every 8 hours as needed for moderate to severe pain (try to limit use, no further prescriptions until seen in pain clinic)  "  polyethylene glycol (MIRALAX/GLYCOLAX) powder    potassium chloride ER (K-DUR/KLOR-CON M) 20 MEQ CR tablet Take 2 tablets (40 mEq) by mouth 4 times daily   Respiratory Therapy Supplies (NEBULIZER COMPRESSOR) KIT 1 Device 4 times daily as needed.   senna (SENOKOT) 8.6 MG tablet Take 1 tablet by mouth 2 times daily   spironolactone (ALDACTONE) 50 MG tablet Take 1 tablet (50 mg) by mouth daily   tiotropium (SPIRIVA HANDIHALER) 18 MCG inhalation capsule Inhale contents of one capsule daily.   topiramate (TOPAMAX) 50 MG tablet Take 2 tablets (100 mg) by mouth daily Take 100 mg for 1 month and increase to 150 mg thereafter   torsemide (DEMADEX) 100 MG tablet Take 1 tablet (100 mg) by mouth 2 times daily   warfarin (COUMADIN) 5 MG tablet [id number] on MTuThSatSun, and 20mg on WF, or as directed by the Medication Monitoring Clinic at the [facility].   warfarin (COUMADIN) 5 MG tablet Take one to two tablets daily or as directed by the Coumadin Clinic     No current facility-administered medications on file prior to visit.       ROS:   Constitutional: No fever, chills, or sweats. No weight gain/loss.   ENT: No visual disturbance, ear ache, epistaxis, sore throat.   Allergies/Immunologic: Negative.   Respiratory: No cough, hemoptysis.   Cardiovascular: As per HPI.   GI: No nausea, vomiting, hematemesis, melena, or hematochezia.   : No urinary frequency, dysuria, or hematuria.   Integument: Negative.   Psychiatric: Negative.   Neuro: Negative.   Endocrinology: Negative.   Musculoskeletal: Negative.    EXAM:  /77 (BP Location: Left arm, Patient Position: Chair, Cuff Size: Adult Large)   Pulse 90   Ht 1.702 m (5' 7\")   Wt (!) 224.6 kg (495 lb 1.6 oz)   SpO2 97%   BMI 77.54 kg/m     General: Obese female;  appears comfortable, alert and articulate; She gets SOB with getting on exam table  Eyes: anicteric sclera, EOMI  Neck: no adenopathy  Orophyarynx: moist mucosa, no lesions, dentition intact  Heart: regular, S1/S2, no " murmur, gallop, rub, cannot see JVP  Lungs: clear, no rales or wheezing  Abdomen: obese; soft, non-tender, bowel sounds present, no hepatomegaly  Extremities: no clubbing, cyanosis or edema  Neurological: normal speech and affect, no gross motor deficits    Labs:  CMP RESULTS:  Lab Results   Component Value Date     01/07/2019    POTASSIUM 4.1 01/07/2019    CHLORIDE 106 01/07/2019    CO2 26 01/07/2019    ANIONGAP 7 01/07/2019     (H) 01/07/2019    BUN 24 01/07/2019    CR 1.18 (H) 01/07/2019    GFRESTIMATED 56 (L) 01/07/2019    GFRESTBLACK 65 01/07/2019    JUSTIN 8.4 (L) 01/07/2019    BILITOTAL 0.2 10/25/2018    ALBUMIN 2.8 (L) 10/25/2018    ALKPHOS 89 10/25/2018    ALT 19 10/25/2018    AST 9 10/25/2018      Zio Monitor (4/18)  Predominant rhythm is sinus rhythm.   1 run of VT lasting 6 beats at a rate of 200 bpm  AF burdeno f 7%  AF with RVR episode associated with patient symptoms    RHC (2/2017)  RA 13  PA 32/17, 23  PAW 25, 25, 20  CO Catarina: 7.3 (2.6)    Assessment and Plan:   Pricilla is a pleasant 44-year-old woman with NICM and systolic heart failure. Pricilla has not been taking metolazone consistently and will increase today's dose to 5 mg to catch up. She'll then resume 2.5 mg dosing. Her blood pressures have improved since increasing hydralazine though her rate control is still marginal. She'll increase Toprol to 150 mg daily. Return to clinic in 2 months and was applauded for quitting smoking x 5 days.    1. Chronic systolic heart failure secondary to NICM.    Stage C  NYHA Class IIIa  ACEi/ARB yes + hydralazine for afterload  BB yes - Toprol increased to 150 mg daily today  Aldosterone antagonist yes -- spironolactone 50 mg daily  SCD prophylaxis does not meet criteria for implant  % BiV pacing: N/A  Fluid status: Hypervolemic despite massive doses of diuretics, though unclear how reliably she is taking her regimen.    2. AFib: Anticoagulated. Rate control is marginal. Increasing Toprol today    3.  Tobacco use: Reports quitting 5 days ago. Applauded for her efforts!    4. HTN controlled on current. Also increase Toprol today    20 minutes spent in direct care, >50% in counseling      CC  Patient Care Team:  Jamilah Bernal MD as PCP - General (Internal Medicine)  Percy Alcala MD as MD (Cardiology)  Silva Damon MD as MD (Internal Medicine)  Norman Jean DPM (Podiatry)  Zita Liao as Registered Nurse (Diabetes Education)  Steph Kim PA-C as Physician Assistant (Physician Assistant)  Delmi Orellana MD as MD (Ophthalmology)  Nelly Miranda MD as MD (Ophthalmology)  Drea Rosas, RN as Clinic Care Coordinator (Bariatric)  Isela Archibald PA-C as Physician Assistant (Physician Assistant)  Tom Guardado MD as MD (Ophthalmology)  Russel Vergara OD as MD (Optometry)  Ophelia Kenny, RN as Nurse Coordinator (Cardiology)  Kristen Cuello APRN CNP as Nurse Practitioner (Cardiology)  Ximena Carpenter NP as Nurse Practitioner (Cardiology)  Ximena Carpenter NP as Nurse Practitioner (Nurse Practitioner - Family)  Vladislav Samuel MD as MD (Ophthalmology)  Alexandru Wang RN as Nurse Coordinator (Cardiology)  Lauren Resendez [facility] as Pharmacist (Pharmacist Clinician- Clinical Pharmacy Specialist)  SELF, REFERRED

## 2019-01-07 NOTE — PROGRESS NOTES
HPI:    is a 43 yr old female patient followed by Dr. Alcala for dilated cardiomyopathy, [place] [facility] [place]  She was last seen in clinic 3 months ago by Dr. Alcala when hydralazine dosing was increased. She returns for follow up.    Pricilla had a recent URI that improved over the last several days. She felt her fluids have been better controlled over the last several weeks but noted some bloating yesterday, could be related to increased fluid intake while sick. However, she hasn't felt that her evening dose of torsemide or this am have kicked in. Slept sitting upright last night, as a result with CPAP. No chest pain, + palpitations -- more than prior, some episodes of lightheadedness -- attributed to recent increase in hydralazine. No lower extremity edema, + belly bloating    PAST MEDICAL HISTORY:  Past Medical History:   Diagnosis Date     A-fib (H) 2011    on coumadin since 1/13     Asthma     as a kid     Chest pain 2/1/2017     Chronic anticoagulation for a-fib 2/15/2013    INR's followed by coumadin clinic at      Diabetes mellitus (H) 2012     Diastolic heart failure 2/15/2013     Dilated cardiomyopathy (H) 1/8/2013     HTN (hypertension)      Hyperthyroidism     Graves, s/p I131 1/13, now on prednisone and methimazole     Morbid obesity (H)      Pulmonary embolism (H) 1/12    hospitalized in [place], on lovenox/coumadin for a few months but stopped, hypercoag w/u neg per pt     Sleep apnea     using CPAP       FAMILY HISTORY:  Family History   Problem Relation Age of Onset     Thyroid Disease Mother         Grave's D     Diabetes Mother      Heart Disease Mother      Cerebrovascular Disease Mother         dec. 54 yo     Hypertension Mother      Heart Disease Sister         Heart Murmur     Diabetes Sister      Heart Disease Father         dec in his 30s, MI     Psychotic Disorder Brother         Bipolar     Diabetes Brother      Thyroid Disease Brother         Hyper Thyroid     Heart  Disease Brother      Thyroid Disease Sister         Hyper Thyroid     Thyroid Disease Sister         Hyper Thyroid     Thyroid Disease Sister         Mental Health Problems     Thyroid Disease Maternal Aunt      Thyroid Disease Maternal Uncle      Cancer No family hx of      Glaucoma No family hx of      Macular Degeneration No family hx of        SOCIAL HISTORY:  Social History     Social History     Marital status: Single     Spouse name: N/A     Number of children: N/A     Years of education: N/A     Social History Main Topics     Smoking status: Light Tobacco Smoker     Packs/day: 0.25     Years: 13.00     Types: Cigarettes     Smokeless tobacco: Never Used      Comment: down to 5 cigs     Alcohol use No     Drug use: No     Sexual activity: Yes     Partners: Male     Birth control/ protection: Condom     Other Topics Concern     None     Social History Narrative    Single, no children        Gyn:        Last pap several years ago, no abnormal    No STIs            Patient is single.  She is no longer working.  She used to work in the area of customer service.  She is currently living with her sister in [place], [place].  She has no pets.  Patient has smoked a half pack of cigarettes a day for the past 10 plus years.  She states that she is down to 5 cigarettes a day with the aid of Wellbutrin.  She does not smoke cigars, pipes or chew tobacco.  She has 1 cup of coffee in the morning.  She does not drink alcohol.  Patient does not exercise.        CURRENT MEDICATIONS:  Current Outpatient Medications on File Prior to Visit:  acetaminophen (TYLENOL) 325 MG tablet Take 2 tablets (650 mg) by mouth 3 times daily as needed for mild pain or fever (total acetaminophen dose should not exceed 3000 mg per day)   albuterol (PROAIR HFA/PROVENTIL HFA/VENTOLIN HFA) 108 (90 Base) MCG/ACT inhaler Inhale 2 puffs into the lungs every 6 hours as needed   bisacodyl (DULCOLAX) 10 MG suppository Place 1 suppository (10 mg)  rectally daily as needed for constipation   blood glucose (NO BRAND SPECIFIED) lancets standard USE TO CHECK  blood sugar fasting each am  prelunch and predinner daily OR AS DIRECTED Call clinic to schedule MD APPT.   blood glucose (NO BRAND SPECIFIED) test strip Use to test blood sugars 3 times daily or as directed   blood glucose monitoring (NO BRAND SPECIFIED) meter device kit Use to test blood sugar 3 times daily or as directed.   Blood Glucose Monitoring Suppl (IBG STAR) W/DEVICE KIT -PLEASE GIVE PATIENT A DEVICE HER INSURANCE WILL COVER-   buPROPion (WELLBUTRIN SR) 100 MG 12 hr tablet Take 1 tablet (100 mg) by mouth 2 times daily   capsaicin (ZOSTRIX) 0.025 % external cream Apply 1 g topically 3 times daily as needed   ciclopirox (LOPROX) 0.77 % cream Apply topically 2 times daily To feet and toenails.   ciclopirox 8 % SOLN Externally apply topically daily To toenails.   clotrimazole (LOTRIMIN) 1 % cream Apply topically 2 times daily   clotrimazole (LOTRIMIN) 1 % external cream Apply topically 2 times daily as needed (skin irritation)   colchicine (COLCYRS) 0.6 MG tablet Take 1-2 tablets (0.6-1.2 mg) by mouth daily as needed for moderate pain   diclofenac (VOLTAREN) 1 % GEL topical gel Apply 4 grams to knees or 2 grams to hands four times daily using enclosed dosing card.   dulaglutide (TRULICITY) 1.5 MG/0.5ML pen Inject 1.5 mg Subcutaneous every 7 days   DULoxetine (CYMBALTA) 20 MG capsule Take 1 capsule (20 mg) by mouth 2 times daily   Efinaconazole 10 % SOLN Externally apply topically daily To toenails.   gabapentin (NEURONTIN) 300 MG capsule Take 2 capsules (600 mg) by mouth 2 times daily   hydrALAZINE (APRESOLINE) 25 MG tablet Take 1 tablet (25 MG) in the morning, take 2 tablets (50 MG) midday, and take 3 tablets (75 MG) in the evening.   hydrALAZINE (APRESOLINE) 25 MG tablet Take 50-75 mg by mouth Take 2 tabs (50 mg) in am, 2 tabs (50 mg) midday, and 3 tabs (75 mg) in pm daily    insulin glargine U-300  (TOUJEO) 300 UNIT/ML injection Inject 170 units SQ each am.   insulin pen needle (BD RIVER U/F) 32G X 4 MM Use 6 daily or as directed   levothyroxine (SYNTHROID/LEVOTHROID) 200 MCG tablet TAKE ONE TABLET BY MOUTH EVERY DAY MONDAY THROUGH SATURDAY AND TAKE TWO TABLETS ON SUNDAYS   metolazone (ZAROXOLYN) 2.5 MG tablet Take 1 tablet (2.5 mg) by mouth 2 times daily Take 1/2 hour prior to torsemide   metoprolol succinate ER (TOPROL-XL) 100 MG 24 hr tablet Take 1 tablet (100 mg) by mouth daily   morphine (MS CONTIN) 15 MG 12 hr tablet Take 15 mg by mouth daily as needed   nicotine ([employer] NICOTINE) 14 MG/24HR patch 2h hr Place 1 patch onto the skin every 24 hours   nicotine (NICODERM CQ) 14 MG/24HR 24 hr patch Place 1 patch onto the skin every 24 hours   nicotine (NICOTROL) 10 MG inhaler Inhale short puffs over 5-15 minutes every 1-2 hours as directed.   NOVOLOG FLEXPEN 100 UNIT/ML soln Inject 70 units with meals plus correction. Pt uses approx 200 units in 24 hrs.   nystatin (MYCOSTATIN) 325022 UNIT/GM external cream Apply topically 2 times daily To toenails   order for DME Left foot   order for DME Equipment being ordered:  5463-5043 $92 [id number], fasciitis, LG, night splint   order for DME Equipment being ordered: Challenger Wide walker if available - patient needs seat, basket and brakes.   ORDER FOR DME Use your CPAP device as directed by your provider. Pressure change to min 13 max 18cwp   oxyCODONE-acetaminophen (PERCOCET) 5-325 MG per tablet Take 1 tablet by mouth every 8 hours as needed for moderate to severe pain (try to limit use, no further prescriptions until seen in pain clinic)   polyethylene glycol (MIRALAX/GLYCOLAX) powder    potassium chloride ER (K-DUR/KLOR-CON M) 20 MEQ CR tablet Take 2 tablets (40 mEq) by mouth 4 times daily   Respiratory Therapy Supplies (NEBULIZER COMPRESSOR) KIT 1 Device 4 times daily as needed.   senna (SENOKOT) 8.6 MG tablet Take 1 tablet by mouth 2 times daily   spironolactone  "(ALDACTONE) 50 MG tablet Take 1 tablet (50 mg) by mouth daily   tiotropium (SPIRIVA HANDIHALER) 18 MCG inhalation capsule Inhale contents of one capsule daily.   topiramate (TOPAMAX) 50 MG tablet Take 2 tablets (100 mg) by mouth daily Take 100 mg for 1 month and increase to 150 mg thereafter   torsemide (DEMADEX) 100 MG tablet Take 1 tablet (100 mg) by mouth 2 times daily   warfarin (COUMADIN) 5 MG tablet Pwub86mg on MTuThSatSun, and 20mg on WF, or as directed by the Medication Monitoring Clinic at the [facility].   warfarin (COUMADIN) 5 MG tablet Take one to two tablets daily or as directed by the Coumadin Clinic     No current facility-administered medications on file prior to visit.       ROS:   Constitutional: No fever, chills, or sweats. No weight gain/loss.   ENT: No visual disturbance, ear ache, epistaxis, sore throat.   Allergies/Immunologic: Negative.   Respiratory: No cough, hemoptysis.   Cardiovascular: As per HPI.   GI: No nausea, vomiting, hematemesis, melena, or hematochezia.   : No urinary frequency, dysuria, or hematuria.   Integument: Negative.   Psychiatric: Negative.   Neuro: Negative.   Endocrinology: Negative.   Musculoskeletal: Negative.    EXAM:  /77 (BP Location: Left arm, Patient Position: Chair, Cuff Size: Adult Large)   Pulse 90   Ht 1.702 m (5' 7\")   Wt (!) 224.6 kg (495 lb 1.6 oz)   SpO2 97%   BMI 77.54 kg/m    General: Obese female;  appears comfortable, alert and articulate; She gets SOB with getting on exam table  Eyes: anicteric sclera, EOMI  Neck: no adenopathy  Orophyarynx: moist mucosa, no lesions, dentition intact  Heart: regular, S1/S2, no murmur, gallop, rub, cannot see JVP  Lungs: clear, no rales or wheezing  Abdomen: obese; soft, non-tender, bowel sounds present, no hepatomegaly  Extremities: no clubbing, cyanosis or edema  Neurological: normal speech and affect, no gross motor deficits    Labs:  CMP RESULTS:  Lab Results   Component Value Date     01/07/2019 "    POTASSIUM 4.1 01/07/2019    CHLORIDE 106 01/07/2019    CO2 26 01/07/2019    ANIONGAP 7 01/07/2019     (H) 01/07/2019    BUN 24 01/07/2019    CR 1.18 (H) 01/07/2019    GFRESTIMATED 56 (L) 01/07/2019    GFRESTBLACK 65 01/07/2019    JUSTIN 8.4 (L) 01/07/2019    BILITOTAL 0.2 10/25/2018    ALBUMIN 2.8 (L) 10/25/2018    ALKPHOS 89 10/25/2018    ALT 19 10/25/2018    AST 9 10/25/2018      Zio Monitor (4/18)  Predominant rhythm is sinus rhythm.   1 run of VT lasting 6 beats at a rate of 200 bpm  AF burdeno f 7%  AF with RVR episode associated with patient symptoms    RHC (2/2017)  RA 13  PA 32/17, 23  PAW 25, 25, 20  CO Catarina: 7.3 (2.6)    Assessment and Plan:   Pricilla is a pleasant 44-year-old woman with NICM and systolic heart failure. Pricilla has not been taking metolazone consistently and will increase today's dose to 5 mg to catch up. She'll then resume 2.5 mg dosing. Her blood pressures have improved since increasing hydralazine though her rate control is still marginal. She'll increase Toprol to 150 mg daily. Return to clinic in 2 months and was applauded for quitting smoking x 5 days.    1. Chronic systolic heart failure secondary to NICM.    Stage C  NYHA Class IIIa  ACEi/ARB yes + hydralazine for afterload  BB yes - Toprol increased to 150 mg daily today  Aldosterone antagonist yes -- spironolactone 50 mg daily  SCD prophylaxis does not meet criteria for implant  % BiV pacing: N/A  Fluid status: Hypervolemic despite massive doses of diuretics, though unclear how reliably she is taking her regimen.    2. AFib: Anticoagulated. Rate control is marginal. Increasing Toprol today    3. Tobacco use: Reports quitting 5 days ago. Applauded for her efforts!    4. HTN controlled on current. Also increase Toprol today          20 minutes spent in direct care, >50% in counseling      CC  Patient Care Team:  Jamilah Bernal MD as PCP - General (Internal Medicine)  Percy Alcala MD as MD  (Cardiology)  Silva Damon MD as MD (Internal Medicine)  Norman Jean DPM (Podiatry)  Zita Liao as Registered Nurse (Diabetes Education)  Steph Kim PA-C as Physician Assistant (Physician Assistant)  Delmi Orellana MD as MD (Ophthalmology)  Ruben, Nelly Rogers MD as MD (Ophthalmology)  Drea Rosas, RN as Clinic Care Coordinator (Bariatric)  Isela Archibald PA-C as Physician Assistant (Physician Assistant)  Tom Guardado MD as MD (Ophthalmology)  Russel Vergara OD as MD (Optometry)  Ophelia Kenny, RN as Nurse Coordinator (Cardiology)  Kristen Cuello APRN CNP as Nurse Practitioner (Cardiology)  Ximena Carpenter NP as Nurse Practitioner (Cardiology)  Ximena Carpenter NP as Nurse Practitioner (Nurse Practitioner - Family)  Vladislav Samuel MD as MD (Ophthalmology)  Alexandru Wang RN as Nurse Coordinator (Cardiology)  Lauren Resendez RPH as Pharmacist (Pharmacist Clinician- Clinical Pharmacy Specialist)  SELF, REFERRED

## 2019-01-07 NOTE — PROGRESS NOTES
"  Bariatric Nutrition Consultation Note    Reason For Visit: Nutrition Reassessment    Pricilla Brown is a 43 year-old (type 2 DM on insulin sliding scale) presenting today for bariatric nutrition consult.  Pt is interested in laparoscopic sleeve gastrectomy.  Pt has met all required RD visits prior to surgery.  Pt was referred by NANETTE Kerr and Dr. Silva Damon.    Patient signed ABN 9/20/18-9/20/19.    Coordination Notes:  5/29/18: In light of it being several months before anticipating surgery, writer will revisit task list when Pt is closer to weight loss goal.  Writer will continue to encourage Pt to come at least monthly to see RD.    ANTHROPOMETRICS:  Ht: 67\"  Initial Wt: 462.6 lbs with BMI of 72.6  Current Wt: 491.4 lbs (+6.1 lbs over the past month; +28.8 lbs from initial weight)     Required weight loss goal pre-op: -46 lbs from initial consult weight (goal weight 416.6 lbs or less before surgery)    NUTRITION HISTORY:  Food Allergies/Intolerances: none known  Cravings: sweets, chips, pop (diet)  Triggers/cues causing extra eating: boredom (currently on disability, not working)  *Pt is on topiramate per Dr. Silva Damon who would like Pt to re-try modified liquid diet with 2 shakes/day.    Recent food recall:  Eating one meal and snack per day recently  Meal: italian sausage emma with macaroni and cheese and broccoli   Snack: popcorn  Beverages: water    Progress with Previous Goals:  Relating To Eating:  -Follow the Modified Liquid Diet for weight loss:  Did not follow over the past month due to being ill  Breakfast: Protein Shake (160-250 Jagjit, 20-35 g protein)  Lunch: 3 oz lean protein (chicken/turkey breast or fish)  + non-starchy vegetables (non-starchy veg: green leafy vegetables, cucumbers, broccoli, cauliflower, green string beans)  Supper: 3 oz lean protein (chicken/turkey breast or fish)  + non-starchy vegetables (non-starchy veg: green leafy vegetables, cucumbers, " broccoli, cauliflower, green string beans)  Snack: 1 protein bar + non-starchy vegetables (no calorie-containing dips/condiments)  Beverages: at least 48-64 oz water between meals daily (Powerade Zero or Propel Zero or Crystal Light or Isael, less than 5 Calories per serving)    *Protein Shake Criteria: no more than 250 Calories, at least 20 grams of protein, and less than 10 grams of sugar     Frozen Meal Replacements  Healthy Choice  Lean Cuisine  Atkins Meals  Smart Ones    -Continue eating slowly (20-30 minutes per meal), chewing foods well (25 chews per bite/applesauce consistency)- met    -Increase exercise as able.  (Pool therapy, walking, etc) not doing activity over the past week, foot is hurting more recently     -Continue to work on quitting smoking. - 5 days nicotine free    NUTRITION DIAGNOSIS:  Obesity r/t long history of self-monitoring deficit and excessive energy intake aeb BMI >30.- continues    INTERVENTION:  Intervention Provided/Education Provided:  Patient has not had a cigarette in five days.  She has been sick over the past couple weeks so she has not followed the modified liquid diet.  Pricilla has not gone to pool therapy recently.  Gave encouragement and support.  Provided Pt with list of goals and task list.      Note: Pt stated she did not purchase clinic pharmaceutical grade meal replacements due to financial challenges.   10/30/18- patient was interested in review price information with her significant other at home.    Patient Understanding: good  Expected Compliance: good      GOALS:  Relating To Eating:  -Follow the Modified Liquid Diet for weight loss:    Breakfast: Protein Shake (160-250 Jagjit, 20-35 g protein)  Lunch: 3 oz lean protein (chicken/turkey breast or fish)  + non-starchy vegetables (non-starchy veg: green leafy vegetables, cucumbers, broccoli, cauliflower, green string beans)  Supper: 3 oz lean protein (chicken/turkey breast or fish)  + non-starchy vegetables (non-starchy  veg: green leafy vegetables, cucumbers, broccoli, cauliflower, green string beans)  Snack: 1 protein bar + non-starchy vegetables (no calorie-containing dips/condiments)  Beverages: at least 48-64 oz water between meals daily (Powerade Zero or Propel Zero or Crystal Light or [place], less than 5 Calories per serving)    *Protein Shake Criteria: no more than 250 Calories, at least 20 grams of protein, and less than 10 grams of sugar     Frozen Meal Replacements  Healthy Choice  Lean Cuisine  Atkins Meals  Smart Ones    -Continue eating slowly (20-30 minutes per meal), chewing foods well (25 chews per bite/applesauce consistency)    -Increase exercise as able.  (Pool therapy, walking, etc)    -Continue to work on quitting smoking.       Follow-Up: follow-up with RD in 1 month.      Time spent with patient: 15 minutes     Velvet Mcallister RD, LD

## 2019-01-07 NOTE — PROGRESS NOTES
ANTICOAGULATION FOLLOW-UP CLINIC VISIT    Patient Name:  Pricilla Brown  Date:  1/7/2019  Contact Type:  Telephone    SUBJECTIVE:     Patient Findings     Positives:   Bruising, Other complaints (URI for a week.  Taking robitussin and tylenol)           OBJECTIVE    INR   Date Value Ref Range Status   01/07/2019 3.70 (H) 0.86 - 1.14 Final     Chromogenic Factor 10   Date Value Ref Range Status   04/08/2017 19 (L) 70 - 130 % Final     Comment:     Therapeutic Range:  A Chromogenic Factor 10 level of approximately 20-40%   inversely correlates with an INR of 2-3 for patients receiving Warfarin.   Chromogenic Factor 10 levels below 20% indicate an INR greater than 3 and   levels above 40% indicate an INR less than 2.         ASSESSMENT / PLAN  INR assessment SUPRA    Recheck INR In: 1 WEEK    INR Location Clinic      Anticoagulation Summary  As of 1/7/2019    INR goal:   2.0-2.5   TTR:   42.5 % (2.5 y)   INR used for dosing:      Warfarin maintenance plan:   20 mg (5 mg x 4) every Fri; 15 mg (5 mg x 3) all other days   Full warfarin instructions:   1/7: 10 mg; 1/11: 15 mg; Otherwise 20 mg every Fri; 15 mg all other days   Weekly warfarin total:   110 mg   Plan last modified:   Alecia Jacobson RN (10/24/2018)   Next INR check:   1/14/2019   Priority:   INR   Target end date:   Indefinite    Indications    Atrial fibrillation (H) [I48.91] [I48.91]  Long-term (current) use of anticoagulants [Z79.01] [Z79.01]             Anticoagulation Episode Summary     INR check location:   Clinic Lab    Preferred lab:       Send INR reminders to:   [facility]    Comments:   Contact Patient at [phone]      Anticoagulation Care Providers     Provider Role Specialty Phone number    Percy Alcala MD  Cardiology [phone]            See the Encounter Report to view Anticoagulation Flowsheet and Dosing Calendar (Go to Encounters tab in chart review, and find the Anticoagulation Therapy Visit)    Spoke with  patient.    Dorene Edge RN

## 2019-01-07 NOTE — PATIENT INSTRUCTIONS
GOALS:  Relating To Eating:  -Follow the Modified Liquid Diet for weight loss:    Breakfast: Protein Shake (160-250 Jagjit, 20-35 g protein)  Lunch: 3 oz lean protein (chicken/turkey breast or fish)  + non-starchy vegetables (non-starchy veg: green leafy vegetables, cucumbers, broccoli, cauliflower, green string beans)  Supper: 3 oz lean protein (chicken/turkey breast or fish)  + non-starchy vegetables (non-starchy veg: green leafy vegetables, cucumbers, broccoli, cauliflower, green string beans)  Snack: 1 protein bar + non-starchy vegetables (no calorie-containing dips/condiments)  Beverages: at least 48-64 oz water between meals daily (Powerade Zero or Propel Zero or Crystal Light or Isael, less than 5 Calories per serving)    *Protein Shake Criteria: no more than 250 Calories, at least 20 grams of protein, and less than 10 grams of sugar     Frozen Meal Replacements  Healthy Choice  Lean Cuisine  Atkins Meals  Smart Ones    -Continue eating slowly (20-30 minutes per meal), chewing foods well (25 chews per bite/applesauce consistency)    -Increase exercise as able.  (Pool therapy, walking, etc)    -Continue to work on quitting smoking.     Follow up with RD in one month    Velvet Mcallister RD, LD  If you need to schedule or reschedule with a dietitian please call [phone].

## 2019-01-07 NOTE — NURSING NOTE
Chief Complaint   Patient presents with     Follow Up     reason for visit: Return CORE, 42 yo female, Systolic HF, labs prior     Vitals were taken and medications were reconciled.     HEATHER Montaño  11:10 AM

## 2019-01-07 NOTE — PATIENT INSTRUCTIONS
"You were seen today in the Cardiovascular Clinic at the [facility].     Cardiology Providers you saw during your visit: Ximena Carpenter NP     1. Please take 5 mg of metolazone this afternoon, then resume your typical 2.5 mg twice daily    2. Please increase Toprol (metoprolol) to 150 mg at bedtime    3. Please return to see us in 2 months    3. For cough medicine, avoid the medicines ending with \"D\". You might try Coricidin which is labeled with an orange heart on the box    4. GOOD JOB WITH THE SMOKING!!!!    Results for BRAXTON SALMERON (MRN [id number]) as of 2019 11:56   Ref. Range 2019 10:37   Sodium Latest Ref Range: 133 - 144 mmol/L 138   Potassium Latest Ref Range: 3.4 - 5.3 mmol/L 4.1   Chloride Latest Ref Range: 94 - 109 mmol/L 106   Carbon Dioxide Latest Ref Range: 20 - 32 mmol/L 26   Urea Nitrogen Latest Ref Range: 7 - 30 mg/dL 24   Creatinine Latest Ref Range: 0.52 - 1.04 mg/dL 1.18 (H)   GFR Estimate Latest Ref Range: >60 mL/min/1.73_m2 56 (L)   GFR Estimate If Black Latest Ref Range: >60 mL/min/1.73_m2 65   Calcium Latest Ref Range: 8.5 - 10.1 mg/dL 8.4 (L)   Anion Gap Latest Ref Range: 3 - 14 mmol/L 7   Glucose Latest Ref Range: 70 - 99 mg/dL 162 (H)   INR Latest Ref Range: 0.86 - 1.14  3.70 (H)       Please limit your fluid intake to 2 L (64 ounces) daily.  2 Liters a day = 8.5 cups, or 64 ounces.  Please limit your salt intake to 2 grams a day or less.     If you gain 2# in 24 hours or 5# in one week call Ophelia Kenny RN so we can adjust your medications as needed over the phone.     Please feel free to call me with any questions or concerns.       Ophelia Kenny RN BSN CHFN  [facility]  Cardiology Care Coordinator-Heart Failure Clinic     Questions and [phone].   First press #1 for the [employer] and then press #3 for \"Medical Questions\" to reach us Cardiology Nurses.      On Call Cardiologist for after hours or on weekends: " [phone]   option #4 and ask to speak to the on-call Cardiologist. Inform them you are a CORE/heart failure patient at the [facility].           If you need a medication refill please contact your pharmacy.  Please allow 3 business days for your refill to be completed.  _______________________________________________________  C.[facility].E[facility] CLINIC Cardiomyopathy, Optimization, Rehabilitation, Education   The C.O.R.E. CLINIC is a heart failure specialty clinic within the [facility] where you will work with specialized nurse practitioners dedicated to helping patients with heart failure carefully adjust medications, receive education, and learn who and when to call if symptoms develop. They specialize in helping you better understand your condition, slow the progression of your disease, improve the length and quality of your life, help you detect future heart problems before they become life threatening, and avoid hospitalizations.  As always, thank you for trusting us with your health care needs!

## 2019-01-17 DIAGNOSIS — I48.91 ATRIAL FIBRILLATION, UNSPECIFIED TYPE (H): ICD-10-CM

## 2019-01-17 RX ORDER — WARFARIN SODIUM 5 MG/1
TABLET ORAL
Qty: 170 TABLET | Refills: 1 | Status: SHIPPED | OUTPATIENT
Start: 2019-01-17 | End: 2020-04-09 | Stop reason: SINTOL

## 2019-01-30 DIAGNOSIS — I50.32 CHRONIC DIASTOLIC HEART FAILURE (H): ICD-10-CM

## 2019-01-30 RX ORDER — SPIRONOLACTONE 50 MG/1
50 TABLET, FILM COATED ORAL DAILY
Qty: 90 TABLET | Refills: 1 | Status: SHIPPED | OUTPATIENT
Start: 2019-01-30 | End: 2019-07-24

## 2019-02-04 DIAGNOSIS — I50.32 CHRONIC DIASTOLIC HEART FAILURE (H): Primary | ICD-10-CM

## 2019-02-14 DIAGNOSIS — E11.65 TYPE 2 DIABETES MELLITUS WITH HYPERGLYCEMIA, WITH LONG-TERM CURRENT USE OF INSULIN (H): ICD-10-CM

## 2019-02-14 DIAGNOSIS — Z79.4 TYPE 2 DIABETES MELLITUS WITH HYPERGLYCEMIA, WITH LONG-TERM CURRENT USE OF INSULIN (H): ICD-10-CM

## 2019-02-15 NOTE — TELEPHONE ENCOUNTER
TRULICITY 1.5 MG/0.5ML pen      Last Written Prescription Date:  2-7-18  Last Fill Quantity: 45 ml,   # refills: 3  Last Office Visit : 10-11-18  Future Office visit:  4-5-19    Routing refill request to provider for review/approval because:  Failed protocol. Abnormal Cr  Overdue Hgb A1C, micro albumin

## 2019-02-19 ENCOUNTER — OFFICE VISIT (OUTPATIENT)
Dept: ENDOCRINOLOGY | Facility: CLINIC | Age: 44
End: 2019-02-19
Payer: MEDICARE

## 2019-02-19 ENCOUNTER — OFFICE VISIT (OUTPATIENT)
Dept: SURGERY | Facility: CLINIC | Age: 44
End: 2019-02-19
Payer: MEDICARE

## 2019-02-19 VITALS
SYSTOLIC BLOOD PRESSURE: 159 MMHG | WEIGHT: 293 LBS | DIASTOLIC BLOOD PRESSURE: 84 MMHG | HEART RATE: 87 BPM | OXYGEN SATURATION: 100 % | HEIGHT: 67 IN | BODY MASS INDEX: 45.99 KG/M2

## 2019-02-19 DIAGNOSIS — I48.91 ATRIAL FIBRILLATION (H): ICD-10-CM

## 2019-02-19 DIAGNOSIS — Z79.01 LONG TERM CURRENT USE OF ANTICOAGULANT THERAPY: ICD-10-CM

## 2019-02-19 DIAGNOSIS — E66.01 MORBID OBESITY (H): Primary | ICD-10-CM

## 2019-02-19 LAB — INR PPP: 1.21 (ref 0.86–1.14)

## 2019-02-19 ASSESSMENT — ENCOUNTER SYMPTOMS
FLANK PAIN: 0
POLYDIPSIA: 1
STIFFNESS: 0
SMELL DISTURBANCE: 0
COUGH: 0
NERVOUS/ANXIOUS: 0
WEIGHT GAIN: 0
BACK PAIN: 1
SWOLLEN GLANDS: 0
CLAUDICATION: 0
SYNCOPE: 0
DECREASED APPETITE: 0
MEMORY LOSS: 0
RESPIRATORY PAIN: 0
NECK MASS: 0
COUGH DISTURBING SLEEP: 0
TREMORS: 0
WEAKNESS: 0
SEIZURES: 0
LIGHT-HEADEDNESS: 0
TASTE DISTURBANCE: 0
WHEEZING: 0
BOWEL INCONTINENCE: 0
SPEECH CHANGE: 0
SLEEP DISTURBANCES DUE TO BREATHING: 1
POLYPHAGIA: 1
POSTURAL DYSPNEA: 1
SPUTUM PRODUCTION: 0
JOINT SWELLING: 0
NAUSEA: 0
PARALYSIS: 0
DYSURIA: 0
POOR WOUND HEALING: 0
DIARRHEA: 0
DEPRESSION: 0
CHILLS: 0
VOMITING: 0
FATIGUE: 1
HALLUCINATIONS: 0
RECTAL PAIN: 0
MUSCLE WEAKNESS: 1
HYPOTENSION: 0
SINUS PAIN: 0
ORTHOPNEA: 1
FEVER: 0
DIFFICULTY URINATING: 0
BRUISES/BLEEDS EASILY: 0
SNORES LOUDLY: 0
SKIN CHANGES: 0
EYE IRRITATION: 1
HOT FLASHES: 0
CONSTIPATION: 0
WEIGHT LOSS: 0
HOARSE VOICE: 0
SORE THROAT: 0
MUSCLE CRAMPS: 0
BREAST PAIN: 0
DISTURBANCES IN COORDINATION: 0
LEG PAIN: 1
HEADACHES: 0
TROUBLE SWALLOWING: 0
BLOOD IN STOOL: 0
SINUS CONGESTION: 0
TINGLING: 0
INSOMNIA: 0
RECTAL BLEEDING: 0
EXTREMITY NUMBNESS: 0
ARTHRALGIAS: 0
NUMBNESS: 0
NAIL CHANGES: 0
ALTERED TEMPERATURE REGULATION: 0
ABDOMINAL PAIN: 0
SHORTNESS OF BREATH: 1
DECREASED LIBIDO: 0
HYPERTENSION: 1
MYALGIAS: 0
DYSPNEA ON EXERTION: 1
BREAST MASS: 0
INCREASED ENERGY: 1
NECK PAIN: 0
DECREASED CONCENTRATION: 0
TACHYCARDIA: 1
LEG SWELLING: 1
EYE PAIN: 0
NIGHT SWEATS: 0
DIZZINESS: 0
EXERCISE INTOLERANCE: 0
BLOATING: 0
EYE WATERING: 1
HEMOPTYSIS: 0
JAUNDICE: 0
DOUBLE VISION: 0
PALPITATIONS: 1
HEMATURIA: 0
PANIC: 0
HEARTBURN: 0
EYE REDNESS: 1
LOSS OF CONSCIOUSNESS: 0

## 2019-02-19 ASSESSMENT — PATIENT HEALTH QUESTIONNAIRE - PHQ9
SUM OF ALL RESPONSES TO PHQ QUESTIONS 1-9: 9
SUM OF ALL RESPONSES TO PHQ QUESTIONS 1-9: 9
10. IF YOU CHECKED OFF ANY PROBLEMS, HOW DIFFICULT HAVE THESE PROBLEMS MADE IT FOR YOU TO DO YOUR WORK, TAKE CARE OF THINGS AT HOME, OR GET ALONG WITH OTHER PEOPLE: SOMEWHAT DIFFICULT

## 2019-02-19 ASSESSMENT — MIFFLIN-ST. JEOR: SCORE: 2972.83

## 2019-02-19 NOTE — LETTER
"2/19/2019       RE: Pricilla Brown  [address]  [facility] [address]     Dear Colleague,    Thank you for referring your patient, Pricilla Brown, to the [facility] SURGICAL WEIGHT MANAGEMENT at [facility]. Please see a copy of my visit note below.      Bariatric Nutrition Consultation Note    Reason For Visit: Nutrition Reassessment    Pricilla Brown is a 44 year-old (type 2 DM on insulin sliding scale) presenting today for bariatric nutrition consult.  Pt is interested in laparoscopic sleeve gastrectomy.  Pt has met all required RD visits prior to surgery.  Pt was referred by NANETTE Kerr and Dr. Silva Damon.    Patient signed ABN 9/20/18-9/20/19.    Coordination Notes:  5/29/18: In light of it being several months before anticipating surgery, writer will revisit task list when Pt is closer to weight loss goal.  Writer will continue to encourage Pt to come at least monthly to see RD.    ANTHROPOMETRICS:  Ht: 67\"  Initial Wt: 462.6 lbs with BMI of 72.6  Current Wt: 506 lbs (+14.6 lbs over the past month; +43.4 lbs from initial weight)     Required weight loss goal pre-op: -46 lbs from initial consult weight (goal weight 416.6 lbs or less before surgery)    NUTRITION HISTORY:  Food Allergies/Intolerances: none known  Cravings: sweets, chips, pop (diet)  Triggers/cues causing extra eating: boredom (currently on disability, not working)  *Pt is on topiramate per Dr. Silva Damon who would like Pt to re-try modified liquid diet with 2 shakes/day.    Recent food recall:  Lunch: salad with cheese and croutons, tomatoes and cucumbers, turkey lunch meat with vinaigrette   Dinner: pizza pepperoni 2 slices deep dish  Snack: cheese and cracker, fruit, popcorn  Beverages: water, coffee or tea with a sweetener     Progress with previous goals:  Relating To Eating:  -Follow the Modified Liquid Diet for weight loss:  One shake per day for breakfast  Breakfast: " Protein Shake (160-250 Jagjit, 20-35 g protein)  Lunch: 3 oz lean protein (chicken/turkey breast or fish)  + non-starchy vegetables (non-starchy veg: green leafy vegetables, cucumbers, broccoli, cauliflower, green string beans)  Supper: 3 oz lean protein (chicken/turkey breast or fish)  + non-starchy vegetables (non-starchy veg: green leafy vegetables, cucumbers, broccoli, cauliflower, green string beans)  Snack: 1 protein bar + non-starchy vegetables (no calorie-containing dips/condiments)  Beverages: at least 48-64 oz water between meals daily (Powerade Zero or Propel Zero or Crystal Light or Isael, less than 5 Calories per serving)    *Protein Shake Criteria: no more than 250 Calories, at least 20 grams of protein, and less than 10 grams of sugar     Frozen Meal Replacements  Healthy Choice  Lean Cuisine  Atkins Meals  Smart Ones    -Continue eating slowly (20-30 minutes per meal), chewing foods well (25 chews per bite/applesauce consistency) met/continues    -Increase exercise as able.  (Pool therapy, walking, etc) no exercise due to increased pain, going to chiropractor wants to look into PT    -Continue to work on quitting smoking. Quit for 3-4 weeks, started again due to pain and not being able to move around as much    NUTRITION DIAGNOSIS:  Obesity r/t long history of self-monitoring deficit and excessive energy intake aeb BMI >30.- continues    INTERVENTION:  Intervention Provided/Education Provided:  Patient reported increased pain this past month making it difficult for her to exercise and why she is gaining weight.  Patient continues to use one protein shake for breakfast.  Encouraged patient to include more protein at meals to help with satiety.  Patient is interested in going to physical therapy to see if that helps improve her pain.  Gave encouragement and support.  Provided Pt with list of goals and task list.      Note: Pt stated she did not purchase clinic pharmaceutical grade meal replacements due to  financial challenges.   10/30/18- patient was interested in review price information with her significant other at home.    Patient Understanding: good  Expected Compliance: good      GOALS:  Relating To Eating:  - Include lean protein at each meal  -Follow the Modified Liquid Diet for weight loss:    Breakfast: Protein Shake (160-250 Jagjit, 20-35 g protein)  Lunch: 3 oz lean protein (chicken/turkey breast or fish)  + non-starchy vegetables (non-starchy veg: green leafy vegetables, cucumbers, broccoli, cauliflower, green string beans)  Supper: 3 oz lean protein (chicken/turkey breast or fish)  + non-starchy vegetables (non-starchy veg: green leafy vegetables, cucumbers, broccoli, cauliflower, green string beans)  Snack: 1 protein bar + non-starchy vegetables (no calorie-containing dips/condiments)  Beverages: at least 48-64 oz water between meals daily (Powerade Zero or Propel Zero or Crystal Light or Isael, less than 5 Calories per serving)    *Protein Shake Criteria: no more than 250 Calories, at least 20 grams of protein, and less than 10 grams of sugar     Frozen Meal Replacements  Healthy Choice  Lean Cuisine  Atkins Meals  Smart Ones    -Continue eating slowly (20-30 minutes per meal), chewing foods well (25 chews per bite/applesauce consistency)    -Increase exercise as able.  (Pool therapy, walking, etc)    -Continue to work on quitting smoking.       Follow-Up: follow-up with RD in 1 month.      Time spent with patient: 15 minutes     Velvet Mcallister RD, LD

## 2019-02-19 NOTE — PATIENT INSTRUCTIONS
GOALS:  Relating To Eating:  - Include lean protein at each meal  -Follow the Modified Liquid Diet for weight loss:    Breakfast: Protein Shake (160-250 Jagjit, 20-35 g protein)  Lunch: 3 oz lean protein (chicken/turkey breast or fish)  + non-starchy vegetables (non-starchy veg: green leafy vegetables, cucumbers, broccoli, cauliflower, green string beans)  Supper: 3 oz lean protein (chicken/turkey breast or fish)  + non-starchy vegetables (non-starchy veg: green leafy vegetables, cucumbers, broccoli, cauliflower, green string beans)  Snack: 1 protein bar + non-starchy vegetables (no calorie-containing dips/condiments)  Beverages: at least 48-64 oz water between meals daily (Powerade Zero or Propel Zero or Crystal Light or Marathon, less than 5 Calories per serving)    *Protein Shake Criteria: no more than 250 Calories, at least 20 grams of protein, and less than 10 grams of sugar     Frozen Meal Replacements  Healthy Choice  Lean Cuisine  Atkins Meals  Smart Ones    -Continue eating slowly (20-30 minutes per meal), chewing foods well (25 chews per bite/applesauce consistency)    -Increase exercise as able.  (Pool therapy, walking, etc)    -Continue to work on quitting smoking.     Follow up with RD in one month    Velvet Mcallister RD, LD  If you need to schedule or reschedule with a dietitian please call [phone].
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
## 2019-02-19 NOTE — PROGRESS NOTES
"  Bariatric Nutrition Consultation Note    Reason For Visit: Nutrition Reassessment    Pricilla Brown is a 44 year-old (type 2 DM on insulin sliding scale) presenting today for bariatric nutrition consult.  Pt is interested in laparoscopic sleeve gastrectomy.  Pt has met all required RD visits prior to surgery.  Pt was referred by NANETTE Kerr and Dr. Silva Damon.    Patient signed ABN 9/20/18-9/20/19.    Coordination Notes:  5/29/18: In light of it being several months before anticipating surgery, writer will revisit task list when Pt is closer to weight loss goal.  Writer will continue to encourage Pt to come at least monthly to see RD.    ANTHROPOMETRICS:  Ht: 67\"  Initial Wt: 462.6 lbs with BMI of 72.6  Current Wt: 506 lbs (+14.6 lbs over the past month; +43.4 lbs from initial weight)     Required weight loss goal pre-op: -46 lbs from initial consult weight (goal weight 416.6 lbs or less before surgery)    NUTRITION HISTORY:  Food Allergies/Intolerances: none known  Cravings: sweets, chips, pop (diet)  Triggers/cues causing extra eating: boredom (currently on disability, not working)  *Pt is on topiramate per Dr. Silva Damon who would like Pt to re-try modified liquid diet with 2 shakes/day.    Recent food recall:  Lunch: salad with cheese and croutons, tomatoes and cucumbers, turkey lunch meat with vinaigrette   Dinner: pizza pepperoni 2 slices deep dish  Snack: cheese and cracker, fruit, popcorn  Beverages: water, coffee or tea with a sweetener     Progress with previous goals:  Relating To Eating:  -Follow the Modified Liquid Diet for weight loss:  One shake per day for breakfast  Breakfast: Protein Shake (160-250 Jagjit, 20-35 g protein)  Lunch: 3 oz lean protein (chicken/turkey breast or fish)  + non-starchy vegetables (non-starchy veg: green leafy vegetables, cucumbers, broccoli, cauliflower, green string beans)  Supper: 3 oz lean protein (chicken/turkey breast or fish)  + non-starchy " vegetables (non-starchy veg: green leafy vegetables, cucumbers, broccoli, cauliflower, green string beans)  Snack: 1 protein bar + non-starchy vegetables (no calorie-containing dips/condiments)  Beverages: at least 48-64 oz water between meals daily (Powerade Zero or Propel Zero or Crystal Light or [place], less than 5 Calories per serving)    *Protein Shake Criteria: no more than 250 Calories, at least 20 grams of protein, and less than 10 grams of sugar     Frozen Meal Replacements  Healthy Choice  Lean Cuisine  Atkins Meals  Smart Ones    -Continue eating slowly (20-30 minutes per meal), chewing foods well (25 chews per bite/applesauce consistency) met/continues    -Increase exercise as able.  (Pool therapy, walking, etc) no exercise due to increased pain, going to chiropractor wants to look into PT    -Continue to work on quitting smoking. Quit for 3-4 weeks, started again due to pain and not being able to move around as much    NUTRITION DIAGNOSIS:  Obesity r/t long history of self-monitoring deficit and excessive energy intake aeb BMI >30.- continues    INTERVENTION:  Intervention Provided/Education Provided:  Patient reported increased pain this past month making it difficult for her to exercise and why she is gaining weight.  Patient continues to use one protein shake for breakfast.  Encouraged patient to include more protein at meals to help with satiety.  Patient is interested in going to physical therapy to see if that helps improve her pain.  Gave encouragement and support.  Provided Pt with list of goals and task list.      Note: Pt stated she did not purchase clinic pharmaceutical grade meal replacements due to financial challenges.   10/30/18- patient was interested in review price information with her significant other at home.    Patient Understanding: good  Expected Compliance: good      GOALS:  Relating To Eating:  - Include lean protein at each meal  -Follow the Modified Liquid Diet for weight loss:     Breakfast: Protein Shake (160-250 Jagjit, 20-35 g protein)  Lunch: 3 oz lean protein (chicken/turkey breast or fish)  + non-starchy vegetables (non-starchy veg: green leafy vegetables, cucumbers, broccoli, cauliflower, green string beans)  Supper: 3 oz lean protein (chicken/turkey breast or fish)  + non-starchy vegetables (non-starchy veg: green leafy vegetables, cucumbers, broccoli, cauliflower, green string beans)  Snack: 1 protein bar + non-starchy vegetables (no calorie-containing dips/condiments)  Beverages: at least 48-64 oz water between meals daily (Powerade Zero or Propel Zero or Crystal Light or [place], less than 5 Calories per serving)    *Protein Shake Criteria: no more than 250 Calories, at least 20 grams of protein, and less than 10 grams of sugar     Frozen Meal Replacements  Healthy Choice  Lean Cuisine  Atkins Meals  Smart Ones    -Continue eating slowly (20-30 minutes per meal), chewing foods well (25 chews per bite/applesauce consistency)    -Increase exercise as able.  (Pool therapy, walking, etc)    -Continue to work on quitting smoking.       Follow-Up: follow-up with RD in 1 month.      Time spent with patient: 15 minutes     Velvet Mcallister RD, MARLA

## 2019-02-19 NOTE — PATIENT INSTRUCTIONS
- increase topiramate to 150 mg daily (3 pills) daily  - see Summre Vaughn in 1 month (same day as dietitian)  - see me in 2-3 months  - I will talk to Thelma Archibald about Jardiance    If you have any questions, please do not hesitate to call Weight management clinic at [phone] or [phone].    Sincerely,    Silva Damon MD  Endocrinology

## 2019-02-19 NOTE — NURSING NOTE
"  Chief Complaint   Patient presents with     Weight Problem     RMWM     Vitals:    02/19/19 1500   BP: 159/84   Pulse: 87   SpO2: 100%   Weight: (!) 229.5 kg (506 lb)   Height: 1.702 m (5' 7\")     Body mass index is 79.25 kg/m .  Mike Velázquez CMA    "
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
## 2019-02-20 ENCOUNTER — ANTICOAGULATION THERAPY VISIT (OUTPATIENT)
Dept: ANTICOAGULATION | Facility: CLINIC | Age: 44
End: 2019-02-20

## 2019-02-20 DIAGNOSIS — I48.91 ATRIAL FIBRILLATION, UNSPECIFIED TYPE (H): ICD-10-CM

## 2019-02-20 NOTE — PROGRESS NOTES
ANTICOAGULATION FOLLOW-UP CLINIC VISIT    Patient Name:  Pricilla Brown  Date:  2019  Contact Type:  Telephone    SUBJECTIVE:     Patient Findings     Positives:   Other complaints (pt missed one dose of coumadin last week.)           OBJECTIVE    INR   Date Value Ref Range Status   2019 1.21 (H) 0.86 - 1.14 Final     Chromogenic Factor 10   Date Value Ref Range Status   2017 19 (L) 70 - 130 % Final     Comment:     Therapeutic Range:  A Chromogenic Factor 10 level of approximately 20-40%   inversely correlates with an INR of 2-3 for patients receiving Warfarin.   Chromogenic Factor 10 levels below 20% indicate an INR greater than 3 and   levels above 40% indicate an INR less than 2.         ASSESSMENT / PLAN  INR assessment SUB    Recheck INR In: 8 DAYS    INR Location Clinic      Anticoagulation Summary  As of 2019    INR goal:   2.0-2.5   TTR:   41.4 % (2.6 y)   INR used for dosin.21! (2019)   Warfarin maintenance plan:   20 mg (5 mg x 4) every Fri; 15 mg (5 mg x 3) all other days   Full warfarin instructions:   : 20 mg; Otherwise 20 mg every Fri; 15 mg all other days   Weekly warfarin total:   110 mg   Plan last modified:   Alecia Jacobson RN (10/24/2018)   Next INR check:   2019   Priority:   INR   Target end date:   Indefinite    Indications    Atrial fibrillation (H) [I48.91] [I48.91]  Long-term (current) use of anticoagulants [Z79.01] [Z79.01]             Anticoagulation Episode Summary     INR check location:   Clinic Lab    Preferred lab:       Send INR reminders to:   [facility]    Comments:   Contact Patient at [phone]      Anticoagulation Care Providers     Provider Role Specialty Phone number    Percy Alcala MD  Cardiology [phone]            See the Encounter Report to view Anticoagulation Flowsheet and Dosing Calendar (Go to Encounters tab in chart review, and find the Anticoagulation Therapy Visit)    Spoke with patient      Dorene Edge RN

## 2019-02-21 ENCOUNTER — TELEPHONE (OUTPATIENT)
Dept: ENDOCRINOLOGY | Facility: CLINIC | Age: 44
End: 2019-02-21

## 2019-02-21 NOTE — TELEPHONE ENCOUNTER
I left a message for  Pricilla that  I have a  Contour next one meter and 110 strips here for her to  in  [facility]  Endocrine clinic. She is to ask for Sarina .

## 2019-02-21 NOTE — TELEPHONE ENCOUNTER
[facility] Call Center    Phone Message    May a detailed message be left on voicemail: yes    Reason for Call: Other: Patient would like to talk with a nurse regarding getting new meter.  Hers broke, does not have funds for a new one.  She has not been able to check her blood sugar.  She has not taken any long acting insulin today, she is wondering if she should do that.  Please contact patient at [phone].      Action Taken: Message routed to:  [facility] ([facility]): Endocrinology

## 2019-02-26 ENCOUNTER — TELEPHONE (OUTPATIENT)
Dept: ENDOCRINOLOGY | Facility: CLINIC | Age: 44
End: 2019-02-26

## 2019-02-26 DIAGNOSIS — E11.42 TYPE 2 DIABETES MELLITUS WITH DIABETIC POLYNEUROPATHY, WITHOUT LONG-TERM CURRENT USE OF INSULIN (H): Primary | ICD-10-CM

## 2019-02-26 NOTE — TELEPHONE ENCOUNTER
Discussed with patient. Given uncontrolled diabetes and obesity, I will add Jardiance (SGLT2 inhibitor) to her regimen.  Side effects and benefits discussed.    Will add Jardiance 10 mg daily.  Pt verbalized understanding.    Silva Damon MD  Division of Diabetes and Endocrinology  Department of Medicine  [phone]

## 2019-03-13 ENCOUNTER — ANTICOAGULATION THERAPY VISIT (OUTPATIENT)
Dept: ANTICOAGULATION | Facility: CLINIC | Age: 44
End: 2019-03-13

## 2019-03-13 ENCOUNTER — ALLIED HEALTH/NURSE VISIT (OUTPATIENT)
Dept: SURGERY | Facility: CLINIC | Age: 44
End: 2019-03-13
Payer: MEDICARE

## 2019-03-13 ENCOUNTER — OFFICE VISIT (OUTPATIENT)
Dept: CARDIOLOGY | Facility: CLINIC | Age: 44
End: 2019-03-13
Attending: NURSE PRACTITIONER
Payer: MEDICARE

## 2019-03-13 VITALS — WEIGHT: 293 LBS | BODY MASS INDEX: 78.45 KG/M2

## 2019-03-13 VITALS
SYSTOLIC BLOOD PRESSURE: 125 MMHG | WEIGHT: 293 LBS | OXYGEN SATURATION: 99 % | HEIGHT: 67 IN | BODY MASS INDEX: 45.99 KG/M2 | DIASTOLIC BLOOD PRESSURE: 86 MMHG | HEART RATE: 85 BPM

## 2019-03-13 DIAGNOSIS — Z79.01 LONG TERM CURRENT USE OF ANTICOAGULANT THERAPY: ICD-10-CM

## 2019-03-13 DIAGNOSIS — I50.32 CHRONIC DIASTOLIC HEART FAILURE (H): ICD-10-CM

## 2019-03-13 DIAGNOSIS — I48.91 ATRIAL FIBRILLATION, UNSPECIFIED TYPE (H): ICD-10-CM

## 2019-03-13 DIAGNOSIS — I48.91 ATRIAL FIBRILLATION (H): ICD-10-CM

## 2019-03-13 LAB
ANION GAP SERPL CALCULATED.3IONS-SCNC: 6 MMOL/L (ref 3–14)
BUN SERPL-MCNC: 19 MG/DL (ref 7–30)
CALCIUM SERPL-MCNC: 8.7 MG/DL (ref 8.5–10.1)
CHLORIDE SERPL-SCNC: 102 MMOL/L (ref 94–109)
CO2 SERPL-SCNC: 30 MMOL/L (ref 20–32)
CREAT SERPL-MCNC: 1.21 MG/DL (ref 0.52–1.04)
GFR SERPL CREATININE-BSD FRML MDRD: 54 ML/MIN/{1.73_M2}
GLUCOSE SERPL-MCNC: 175 MG/DL (ref 70–99)
INR PPP: 2.42 (ref 0.86–1.14)
POTASSIUM SERPL-SCNC: 3.6 MMOL/L (ref 3.4–5.3)
SODIUM SERPL-SCNC: 138 MMOL/L (ref 133–144)

## 2019-03-13 PROCEDURE — 99213 OFFICE O/P EST LOW 20 MIN: CPT | Mod: ZP | Performed by: NURSE PRACTITIONER

## 2019-03-13 PROCEDURE — 36415 COLL VENOUS BLD VENIPUNCTURE: CPT | Performed by: NURSE PRACTITIONER

## 2019-03-13 PROCEDURE — 80048 BASIC METABOLIC PNL TOTAL CA: CPT | Performed by: NURSE PRACTITIONER

## 2019-03-13 PROCEDURE — 85610 PROTHROMBIN TIME: CPT | Performed by: NURSE PRACTITIONER

## 2019-03-13 PROCEDURE — G0463 HOSPITAL OUTPT CLINIC VISIT: HCPCS | Mod: ZF

## 2019-03-13 RX ORDER — METOPROLOL SUCCINATE 200 MG/1
200 TABLET, EXTENDED RELEASE ORAL DAILY
Qty: 90 TABLET | Refills: 3 | Status: SHIPPED | OUTPATIENT
Start: 2019-03-13 | End: 2020-04-05

## 2019-03-13 ASSESSMENT — PAIN SCALES - GENERAL: PAINLEVEL: NO PAIN (0)

## 2019-03-13 ASSESSMENT — MIFFLIN-ST. JEOR: SCORE: 2950.15

## 2019-03-13 NOTE — PATIENT INSTRUCTIONS
"You were seen today in the Cardiovascular Clinic at the [facility].     Cardiology Providers you saw during your visit: Ximena Carpenter NP    Follow up and medication changes:  1. Please increase Toprol to 200 mg daily    2. Please get an echocardiogram prior to seeing Dr. Alcala on .    3. We'll call you with any changes based on your labs    4. Quit smoking -- you're already 90% there! Good Job!          Please limit your fluid intake to 2 L (68 ounces) daily.  2 Liters a day = 8.5 cups, or 68 ounces.  Please limit your salt intake to 2 grams a day or less.     If you gain 2# in 24 hours or 5# in one week call Amelia Lowry RN so we can adjust your medications as needed over the phone.     Please feel free to call me with any questions or concerns.       Amelia Lowry RN CHFN  [facility]  Cardiology Care Coordinator-Heart Failure Clinic     Questions and [phone].   First press #1 for the [facility] and then press #3 for \"Medical Questions\" to reach us Cardiology Nurses.      On Call Cardiologist for after hours or on weekends: [phone]   option #4 and ask to speak to the on-call Cardiologist. Inform them you are a CORE/heart failure patient at the [facility].           If you need a medication refill please contact your pharmacy.  Please allow 3 business days for your refill to be completed.  _______________________________________________________  [facility] Cardiomyopathy, Optimization, Rehabilitation, Education   The [facility] is a heart failure specialty clinic within the [facility] where you will work with specialized nurse practitioners dedicated to helping patients with heart failure carefully adjust medications, receive education, and learn who and when to call if symptoms develop. They specialize in helping you better understand your condition, slow the progression of your disease, improve " the length and quality of your life, help you detect future heart problems before they become life threatening, and avoid hospitalizations.  As always, thank you for trusting us with your health care needs!

## 2019-03-13 NOTE — NURSING NOTE
Chief Complaint   Patient presents with     Follow Up     Return CORE, HFrEF, labs prior.      Vitals were taken and medications were reconciled.     Valentine Bower CMA    11:35 AM

## 2019-03-13 NOTE — PROGRESS NOTES
ANTICOAGULATION FOLLOW-UP CLINIC VISIT    Patient Name:  Pricilla Brown  Date:  3/13/2019  Contact Type:  Telephone    SUBJECTIVE:        OBJECTIVE    INR   Date Value Ref Range Status   2019 2.42 (H) 0.86 - 1.14 Final     Chromogenic Factor 10   Date Value Ref Range Status   2017 19 (L) 70 - 130 % Final     Comment:     Therapeutic Range:  A Chromogenic Factor 10 level of approximately 20-40%   inversely correlates with an INR of 2-3 for patients receiving Warfarin.   Chromogenic Factor 10 levels below 20% indicate an INR greater than 3 and   levels above 40% indicate an INR less than 2.         ASSESSMENT / PLAN  INR assessment THER    Recheck INR In: 10 DAYS    INR Location Clinic      Anticoagulation Summary  As of 3/13/2019    INR goal:   2.0-2.5   TTR:   41.3 % (2.7 y)   INR used for dosin.42 (3/13/2019)   Warfarin maintenance plan:   20 mg (5 mg x 4) every Fri; 15 mg (5 mg x 3) all other days   Full warfarin instructions:   20 mg every Fri; 15 mg all other days   Weekly warfarin total:   110 mg   Plan last modified:   Alecia Jacobson RN (10/24/2018)   Next INR check:   3/25/2019   Priority:   INR   Target end date:   Indefinite    Indications    Atrial fibrillation (H) [I48.91] [I48.91]  Long-term (current) use of anticoagulants [Z79.01] [Z79.01]             Anticoagulation Episode Summary     INR check location:   Clinic Lab    Preferred lab:       Send INR reminders to:   [facility]    Comments:   Contact Patient at [phone]      Anticoagulation Care Providers     Provider Role Specialty Phone number    Percy Alcala MD  Cardiology [phone]            See the Encounter Report to view Anticoagulation Flowsheet and Dosing Calendar (Go to Encounters tab in chart review, and find the Anticoagulation Therapy Visit)  Spoke with patient.    Dorene Edge RN

## 2019-03-13 NOTE — PROGRESS NOTES
"  Bariatric Nutrition Consultation Note    Reason For Visit: Nutrition Reassessment    Pricilla Brown is a 44 year-old (type 2 DM on insulin sliding scale) presenting today for bariatric nutrition consult.  Pt is interested in laparoscopic sleeve gastrectomy.  Pt has met all required RD visits prior to surgery.  Pt was referred by NANETTE Kerr and Dr. Silva Damon.    Patient signed ABN 9/20/18-9/20/19.    Coordination Notes:  5/29/18: In light of it being several months before anticipating surgery, writer will revisit task list when Pt is closer to weight loss goal.  Writer will continue to encourage Pt to come at least monthly to see RD.    ANTHROPOMETRICS:  Ht: 67\"  Initial Wt: 462.6 lbs with BMI of 72.6  Current Wt: 500.9 lbs (-5.1 lbs over the past month; +38.3 lbs from initial weight)     Required weight loss goal pre-op: -46 lbs from initial consult weight (goal weight 416.6 lbs or less before surgery)    NUTRITION HISTORY:  Food Allergies/Intolerances: none known  Cravings: sweets, chips, pop (diet)  Triggers/cues causing extra eating: boredom (currently on disability, not working)  *Pt is on topiramate per Dr. Silva Damon   Patient plans to start Jardiance - she has not picked it up from the pharmacy yet but plans to pick it up today.    Recent food recall:  Lunch: polish sausage with bun ketchup mustard onions and french fries  Dinner: pot roast with potatoes and carrots, corn bread and corn on the kauffman  Beverages: ginger ale or 7up    Eating less carbohydrate and more protein.  Did have a couple cans of pop, limited exercise. One protein shakes per day    Relating To Eating:  - Include lean protein at each meal : continues  -Follow the Modified Liquid Diet for weight loss:  Using one protein shake per day  Breakfast: Protein Shake (160-250 Jagjit, 20-35 g protein)  Lunch: 3 oz lean protein (chicken/turkey breast or fish)  + non-starchy vegetables (non-starchy veg: green leafy " vegetables, cucumbers, broccoli, cauliflower, green string beans)  Supper: 3 oz lean protein (chicken/turkey breast or fish)  + non-starchy vegetables (non-starchy veg: green leafy vegetables, cucumbers, broccoli, cauliflower, green string beans)  Snack: 1 protein bar + non-starchy vegetables (no calorie-containing dips/condiments)  Beverages: at least 48-64 oz water between meals daily (Powerade Zero or Propel Zero or Crystal Light or Isael, less than 5 Calories per serving)    *Protein Shake Criteria: no more than 250 Calories, at least 20 grams of protein, and less than 10 grams of sugar     Frozen Meal Replacements  Healthy Choice  Lean Cuisine  Atkins Meals  Smart Ones    -Continue eating slowly (20-30 minutes per meal), chewing foods well (25 chews per bite/applesauce consistency) met/continues    -Increase exercise as able.  (Pool therapy, walking, etc) unable to exercise this past month, pt reported gout flair and pain making exercise difficult.    -Continue to work on quitting smoking. Three cigarettes in the last week    NUTRITION DIAGNOSIS:  Obesity r/t long history of self-monitoring deficit and excessive energy intake aeb BMI >30.- continues    INTERVENTION:  Intervention Provided/Education Provided:  Pricilla stated that she has decreased her carbohydrate at meals, continues to report multiple carb sources at a meal.  She had a few beverages with calories over the past month.  She was unable to exercise over the past month.   She plans to  new medication today for weight loss.  Gave encouragement and support.  Provided Pt with list of goals and task list.      Note: Pt stated she did not purchase clinic pharmaceutical grade meal replacements due to financial challenges.   10/30/18- patient was interested in review price information with her significant other at home.    Patient Understanding: good  Expected Compliance: good      GOALS:  Relating To Eating:  - Include lean protein at each  meal  -Follow the Modified Liquid Diet for weight loss:    Breakfast: Protein Shake (160-250 Jagjit, 20-35 g protein)  Lunch: 3 oz lean protein (chicken/turkey breast or fish)  + non-starchy vegetables (non-starchy veg: green leafy vegetables, cucumbers, broccoli, cauliflower, green string beans)  Supper: 3 oz lean protein (chicken/turkey breast or fish)  + non-starchy vegetables (non-starchy veg: green leafy vegetables, cucumbers, broccoli, cauliflower, green string beans)  Snack: 1 protein bar + non-starchy vegetables (no calorie-containing dips/condiments)  Beverages: at least 48-64 oz water between meals daily (Powerade Zero or Propel Zero or Crystal Light or Isael, less than 5 Calories per serving)    *Protein Shake Criteria: no more than 250 Calories, at least 20 grams of protein, and less than 10 grams of sugar     Frozen Meal Replacements  Healthy Choice  Lean Cuisine  Atkins Meals  Smart Ones    -Continue eating slowly (20-30 minutes per meal), chewing foods well (25 chews per bite/applesauce consistency)    - Separate beverages from meals, no drinking 30 minutes before, during, and 30 minutes after meals    -Increase exercise as able.  (Pool therapy, walking, etc)    -Continue to work on quitting smoking.       Follow-Up: follow-up with RD in 1 month.      Time spent with patient: 15 minutes     Velvet Mcallister RD, LD

## 2019-03-13 NOTE — PATIENT INSTRUCTIONS
GOALS:  Relating To Eating:  - Include lean protein at each meal  -Follow the Modified Liquid Diet for weight loss:    Breakfast: Protein Shake (160-250 Jagjit, 20-35 g protein)  Lunch: 3 oz lean protein (chicken/turkey breast or fish)  + non-starchy vegetables (non-starchy veg: green leafy vegetables, cucumbers, broccoli, cauliflower, green string beans)  Supper: 3 oz lean protein (chicken/turkey breast or fish)  + non-starchy vegetables (non-starchy veg: green leafy vegetables, cucumbers, broccoli, cauliflower, green string beans)  Snack: 1 protein bar + non-starchy vegetables (no calorie-containing dips/condiments)  Beverages: at least 48-64 oz water between meals daily (Powerade Zero or Propel Zero or Crystal Light or [place], less than 5 Calories per serving)    *Protein Shake Criteria: no more than 250 Calories, at least 20 grams of protein, and less than 10 grams of sugar     Frozen Meal Replacements  Healthy Choice  Lean Cuisine  Atkins Meals  Smart Ones    -Continue eating slowly (20-30 minutes per meal), chewing foods well (25 chews per bite/applesauce consistency)    - Separate beverages from meals, no drinking 30 minutes before, during, and 30 minutes after meals    -Increase exercise as able.  (Pool therapy, walking, etc)    -Continue to work on quitting smoking.     Follow up with RD in one month    Velvet Mcallister RD, LD  If you need to schedule or reschedule with a dietitian please call [phone].

## 2019-03-13 NOTE — LETTER
3/13/2019      RE: Pricilla Brown  [address]  [facility] [address]       Dear Colleague,    Thank you for the opportunity to participate in the care of your patient, Pricilla Brown, at the [facility] HEART [facility] at [facility]. Please see a copy of my visit note below.    HPI:    is a 44 yr old female patient followed by Dr. Alcala for dilated cardiomyopathy, presents for follow up to [facility].  She was last seen in clinic 2 months ago when her Toprol dose was increased. She returns for follow up.     Pricilla has been feeling okay though still feels short of breath with a flight of stairs on a good day and with ADLs on a bad day. Noting more palpitations at night with one episode of acute chest discomfort that resolved with rest. Regular chest heaviness that tends to occur the day following palpitations. Described as a dull chest soreness that is not related to activity. Intermittent congestion, most notable with abdominal bloating. Taking metolazone roughly twice weekly. Stable 3 pillow orthopnea. One episode of PND this past week. Using CPAP.     PAST MEDICAL HISTORY:  Past Medical History:   Diagnosis Date     A-fib (H) 2011    on coumadin since 1/13     Asthma     as a kid     Chest pain 2/1/2017     Chronic anticoagulation for a-fib 2/15/2013    INR's followed by coumadin clinic at      Diabetes mellitus (H) 2012     Diastolic heart failure 2/15/2013     Dilated cardiomyopathy (H) 1/8/2013     HTN (hypertension)      Hyperthyroidism     Graves, s/p I131 1/13, now on prednisone and methimazole     Morbid obesity (H)      Pulmonary embolism (H) 1/12    hospitalized in [place], on lovenox/coumadin for a few months but stopped, hypercoag w/u neg per pt     Sleep apnea     using CPAP       FAMILY HISTORY:  Family History   Problem Relation Age of Onset     Thyroid Disease Mother         Grave's D     Diabetes Mother      Heart Disease Mother      Cerebrovascular Disease  Mother         dec. 54 yo     Hypertension Mother      Heart Disease Sister         Heart Murmur     Diabetes Sister      Heart Disease Father         dec in his 30s, MI     Psychotic Disorder Brother         Bipolar     Diabetes Brother      Thyroid Disease Brother         Hyper Thyroid     Heart Disease Brother      Thyroid Disease Sister         Hyper Thyroid     Thyroid Disease Sister         Hyper Thyroid     Thyroid Disease Sister         Mental Health Problems     Thyroid Disease Maternal Aunt      Thyroid Disease Maternal Uncle      Cancer No family hx of      Glaucoma No family hx of      Macular Degeneration No family hx of        SOCIAL HISTORY:  Social History     Social History     Marital status: Single     Spouse name: N/A     Number of children: N/A     Years of education: N/A     Social History Main Topics     Smoking status: Light Tobacco Smoker     Packs/day: 0.25     Years: 13.00     Types: Cigarettes     Smokeless tobacco: Never Used      Comment: down to 5 cigs     Alcohol use No     Drug use: No     Sexual activity: Yes     Partners: Male     Birth control/ protection: Condom     Other Topics Concern     None     Social History Narrative    Single, no children        Gyn:        Last pap several years ago, no abnormal    No STIs            Patient is single.  She is no longer working.  She used to work in the area of customer service.  She is currently living with her sister in [place], [place].  She has no pets.  Patient has smoked a half pack of cigarettes a day for the past 10 plus years.  She states that she is down to 5 cigarettes a day with the aid of Wellbutrin.  She does not smoke cigars, pipes or chew tobacco.  She has 1 cup of coffee in the morning.  She does not drink alcohol.  Patient does not exercise.        CURRENT MEDICATIONS:  Current Outpatient Medications on File Prior to Visit:  acetaminophen (TYLENOL) 325 MG tablet Take 2 tablets (650 mg) by mouth 3 times daily  as needed for mild pain or fever (total acetaminophen dose should not exceed 3000 mg per day)   bisacodyl (DULCOLAX) 10 MG suppository Place 1 suppository (10 mg) rectally daily as needed for constipation   buPROPion (WELLBUTRIN SR) 100 MG 12 hr tablet Take 1 tablet (100 mg) by mouth 2 times daily   clotrimazole (LOTRIMIN) 1 % external cream Apply topically 2 times daily as needed (skin irritation)   dulaglutide (TRULICITY) 1.5 MG/0.5ML pen Inject 1.5 mg Subcutaneous every 7 days   DULoxetine (CYMBALTA) 20 MG capsule Take 1 capsule (20 mg) by mouth 2 times daily   empagliflozin (JARDIANCE) 10 MG TABS tablet Take 1 tablet (10 mg) by mouth daily   hydrALAZINE (APRESOLINE) 25 MG tablet Take 1 tablet (25 MG) in the morning, take 2 tablets (50 MG) midday, and take 3 tablets (75 MG) in the evening.   hydrALAZINE (APRESOLINE) 25 MG tablet Take 50-75 mg by mouth Take 2 tabs (50 mg) in am, 2 tabs (50 mg) midday, and 3 tabs (75 mg) in pm daily    metolazone (ZAROXOLYN) 2.5 MG tablet Take 1 tablet (2.5 mg) by mouth 2 times daily Take 1/2 hour prior to torsemide   morphine (MS CONTIN) 15 MG 12 hr tablet Take 15 mg by mouth daily as needed   nicotine ([employer] NICOTINE) 14 MG/24HR patch 2h hr Place 1 patch onto the skin every 24 hours   nicotine (NICODERM CQ) 14 MG/24HR 24 hr patch Place 1 patch onto the skin every 24 hours   order for DME Left foot   order for DME Equipment being ordered:  4758-5905 $92 [id number], fasciitis, LG, night splint   order for DME Equipment being ordered: Challenger Herminia walker if available - patient needs seat, basket and brakes.   ORDER FOR DME Use your CPAP device as directed by your provider. Pressure change to min 13 max 18cwp   oxyCODONE-acetaminophen (PERCOCET) 5-325 MG per tablet Take 1 tablet by mouth every 8 hours as needed for moderate to severe pain (try to limit use, no further prescriptions until seen in pain clinic)   polyethylene glycol (MIRALAX/GLYCOLAX) powder    potassium chloride  "ER (K-DUR/KLOR-CON M) 20 MEQ CR tablet Take 2 tablets (40 mEq) by mouth 4 times daily   Respiratory Therapy Supplies (NEBULIZER COMPRESSOR) KIT 1 Device 4 times daily as needed.   senna (SENOKOT) 8.6 MG tablet Take 1 tablet by mouth 2 times daily   spironolactone (ALDACTONE) 50 MG tablet Take 1 tablet (50 mg) by mouth daily   tiotropium (SPIRIVA HANDIHALER) 18 MCG inhalation capsule Inhale contents of one capsule daily.   torsemide (DEMADEX) 100 MG tablet Take 1 tablet (100 mg) by mouth 2 times daily   warfarin (COUMADIN) 5 MG tablet TAKE 3 TABLETS BY MOUTH ON MON, TUES, THURS, SAT, AND SUN AND 4 TABS ON WED AND FRI.   warfarin (COUMADIN) 5 MG tablet [id number] on MTuThSatSun, and 20mg on WF, or as directed by the Medication Monitoring Clinic at the [facility].     No current facility-administered medications on file prior to visit.       ROS:   Constitutional: No fever, chills, or sweats. No weight gain/loss.   ENT: No visual disturbance, ear ache, epistaxis, sore throat.   Allergies/Immunologic: Negative.   Respiratory: No cough, hemoptysis.   Cardiovascular: As per HPI.   GI: No nausea, vomiting, hematemesis, melena, or hematochezia.   : No urinary frequency, dysuria, or hematuria.   Integument: Negative.   Psychiatric: Negative.   Neuro: Negative.   Endocrinology: Negative.   Musculoskeletal: Negative.    EXAM:  /86 (BP Location: Left arm, Patient Position: Chair, Cuff Size: Adult Large)   Pulse 85   Ht 1.702 m (5' 7\")   Wt (!) 227.3 kg (501 lb)   SpO2 99%   BMI 78.47 kg/m     General: Obese female;  appears comfortable, alert and articulate; She gets SOB with getting on exam table  Eyes: anicteric sclera, EOMI  Neck: no adenopathy  Orophyarynx: moist mucosa, no lesions, dentition intact  Heart: regular, S1/S2, no murmur, gallop, rub, cannot see JVP  Lungs: clear, no rales or wheezing  Abdomen: obese; soft, non-tender, bowel sounds present, no hepatomegaly  Extremities: no clubbing, cyanosis or " edema  Neurological: normal speech and affect, no gross motor deficits    Labs:  CMP RESULTS:  Lab Results   Component Value Date     03/18/2019    POTASSIUM 4.4 03/18/2019    CHLORIDE 106 03/18/2019    CO2 30 03/18/2019    ANIONGAP 4 03/18/2019     (H) 03/18/2019    BUN 22 03/18/2019    CR 1.17 (H) 03/18/2019    GFRESTIMATED 56 (L) 03/18/2019    GFRESTBLACK 65 03/18/2019    JUSTIN 8.8 03/18/2019    BILITOTAL 0.2 10/25/2018    ALBUMIN 2.8 (L) 10/25/2018    ALKPHOS 89 10/25/2018    ALT 19 10/25/2018    AST 9 10/25/2018      Zio Monitor (4/18)  Predominant rhythm is sinus rhythm.   1 run of VT lasting 6 beats at a rate of 200 bpm  AF burdeno f 7%  AF with RVR episode associated with patient symptoms    RHC (2/2017)  RA 13  PA 32/17, 23  PAW 25, 25, 20  CO Catarina: 7.3 (2.6)    Assessment and Plan:   Pricilla is a pleasant 44-year-old woman with NICM and systolic heart failure. To optimize regimen and hopefully improve rate control, increase Toprol to 200 mg daily. She will have an echo and see Dr. Alcala in 1 month.    Pricilla had discontinued smoking but has since resumed smoking occasionally. She was encouraged to stop and assistance was offered.     1. Chronic systolic heart failure secondary to NICM.    Stage C  NYHA Class IIIa  ACEi/ARB yes + hydralazine for afterload (25 mg, 50 mg, 75 mg)  BB yes - Toprol increased to 200 mg daily today  Aldosterone antagonist yes -- spironolactone 50 mg daily  SCD prophylaxis does not meet criteria for implant  % BiV pacing: N/A  Fluid status: euvolemic on massive doses of diuretics, though unclear how reliably she is taking her regimen.    2. AFib: Anticoagulated. Rate control inadequate plus experiencing regular palpitations. Increasing Toprol today.     3. Tobacco use: encouraged to discontinue    4. HTN. Controlled on current    20 minutes spent in direct care, >50% in counseling    Ximena Carpenter NP      CC  Patient Care Team:  Jamilah Bernal MD as PCP -  General (Internal Medicine)  Percy Alcala MD as MD (Cardiology)  Silva Damon MD as MD (Internal Medicine)  Norman Jean DPM (Podiatry)  Steph Kim PA-C as Physician Assistant (Physician Assistant)  Delmi Orellana MD as MD (Ophthalmology)  Nelly Miranda MD as MD (Ophthalmology)  Drea Rosas, RN as Clinic Care Coordinator (Bariatric)  Isela Archibald PA-C as Physician Assistant (Physician Assistant)  Tom Guardado MD as MD (Ophthalmology)  Russel Vergara OD as MD (Optometry)  Ophelia Kenny, RN as Nurse Coordinator (Cardiology)  Kristen Cuello APRN CNP as Nurse Practitioner (Cardiology)  Ximena Carpenter NP as Nurse Practitioner (Cardiology)  Ximena Carpenter NP as Nurse Practitioner (Nurse Practitioner - Family)  Vladislav Samuel MD as MD (Ophthalmology)  Lauren Resendez [facility] as Pharmacist (Pharmacist Clinician- Clinical Pharmacy Specialist)  Alexandru Wang RN as Specialty Care Coordinator (Cardiology)  SELF, REFERRED

## 2019-03-13 NOTE — PROGRESS NOTES
HPI:    is a 44 yr old female patient followed by Dr. Alcala for dilated cardiomyopathy, presents for follow up to [facility] [place].  She was last seen in clinic 2 months ago when her Toprol dose was increased. She returns for follow up.     Pricilla has been feeling okay though still feels short of breath with a flight of stairs on a good day and with ADLs on a bad day. Noting more palpitations at night with one episode of acute chest discomfort that resolved with rest. Regular chest heaviness that tends to occur the day following palpitations. Described as a dull chest soreness that is not related to activity. Intermittent congestion, most notable with abdominal bloating. Taking metolazone roughly twice weekly. Stable 3 pillow orthopnea. One episode of PND this past week. Using CPAP.     PAST MEDICAL HISTORY:  Past Medical History:   Diagnosis Date     A-fib (H) 2011    on coumadin since 1/13     Asthma     as a kid     Chest pain 2/1/2017     Chronic anticoagulation for a-fib 2/15/2013    INR's followed by coumadin clinic at      Diabetes mellitus (H) 2012     Diastolic heart failure 2/15/2013     Dilated cardiomyopathy (H) 1/8/2013     HTN (hypertension)      Hyperthyroidism     Graves, s/p I131 1/13, now on prednisone and methimazole     Morbid obesity (H)      Pulmonary embolism (H) 1/12    hospitalized in [place], on lovenox/coumadin for a few months but stopped, hypercoag w/u neg per pt     Sleep apnea     using CPAP       FAMILY HISTORY:  Family History   Problem Relation Age of Onset     Thyroid Disease Mother         Grave's D     Diabetes Mother      Heart Disease Mother      Cerebrovascular Disease Mother         dec. 56 yo     Hypertension Mother      Heart Disease Sister         Heart Murmur     Diabetes Sister      Heart Disease Father         dec in his 30s, MI     Psychotic Disorder Brother         Bipolar     Diabetes Brother      Thyroid Disease Brother         Hyper Thyroid     Heart Disease Brother       Thyroid Disease Sister         Hyper Thyroid     Thyroid Disease Sister         Hyper Thyroid     Thyroid Disease Sister         Mental Health Problems     Thyroid Disease Maternal Aunt      Thyroid Disease Maternal Uncle      Cancer No family hx of      Glaucoma No family hx of      Macular Degeneration No family hx of        SOCIAL HISTORY:  Social History     Social History     Marital status: Single     Spouse name: N/A     Number of children: N/A     Years of education: N/A     Social History Main Topics     Smoking status: Light Tobacco Smoker     Packs/day: 0.25     Years: 13.00     Types: Cigarettes     Smokeless tobacco: Never Used      Comment: down to 5 cigs     Alcohol use No     Drug use: No     Sexual activity: Yes     Partners: Male     Birth control/ protection: Condom     Other Topics Concern     None     Social History Narrative    Single, no children        Gyn:        Last pap several years ago, no abnormal    No STIs            Patient is single.  She is no longer working.  She used to work in the area of customer service.  She is currently living with her sister in [place], [place].  She has no pets.  Patient has smoked a half pack of cigarettes a day for the past 10 plus years.  She states that she is down to 5 cigarettes a day with the aid of Wellbutrin.  She does not smoke cigars, pipes or chew tobacco.  She has 1 cup of coffee in the morning.  She does not drink alcohol.  Patient does not exercise.        CURRENT MEDICATIONS:  Current Outpatient Medications on File Prior to Visit:  acetaminophen (TYLENOL) 325 MG tablet Take 2 tablets (650 mg) by mouth 3 times daily as needed for mild pain or fever (total acetaminophen dose should not exceed 3000 mg per day)   bisacodyl (DULCOLAX) 10 MG suppository Place 1 suppository (10 mg) rectally daily as needed for constipation   buPROPion (WELLBUTRIN SR) 100 MG 12 hr tablet Take 1 tablet (100 mg) by mouth 2 times daily   clotrimazole  (LOTRIMIN) 1 % external cream Apply topically 2 times daily as needed (skin irritation)   dulaglutide (TRULICITY) 1.5 MG/0.5ML pen Inject 1.5 mg Subcutaneous every 7 days   DULoxetine (CYMBALTA) 20 MG capsule Take 1 capsule (20 mg) by mouth 2 times daily   empagliflozin (JARDIANCE) 10 MG TABS tablet Take 1 tablet (10 mg) by mouth daily   hydrALAZINE (APRESOLINE) 25 MG tablet Take 1 tablet (25 MG) in the morning, take 2 tablets (50 MG) midday, and take 3 tablets (75 MG) in the evening.   hydrALAZINE (APRESOLINE) 25 MG tablet Take 50-75 mg by mouth Take 2 tabs (50 mg) in am, 2 tabs (50 mg) midday, and 3 tabs (75 mg) in pm daily    metolazone (ZAROXOLYN) 2.5 MG tablet Take 1 tablet (2.5 mg) by mouth 2 times daily Take 1/2 hour prior to torsemide   morphine (MS CONTIN) 15 MG 12 hr tablet Take 15 mg by mouth daily as needed   nicotine ([employer] NICOTINE) 14 MG/24HR patch 2h hr Place 1 patch onto the skin every 24 hours   nicotine (NICODERM CQ) 14 MG/24HR 24 hr patch Place 1 patch onto the skin every 24 hours   order for DME Left foot   order for DME Equipment being ordered: [id number] $92 [id number], fasciitis, LG, night splint   order for DME Equipment being ordered: Challenger Wide walker if available - patient needs seat, basket and brakes.   ORDER FOR DME Use your CPAP device as directed by your provider. Pressure change to min 13 max 18cwp   oxyCODONE-acetaminophen (PERCOCET) 5-325 MG per tablet Take 1 tablet by mouth every 8 hours as needed for moderate to severe pain (try to limit use, no further prescriptions until seen in pain clinic)   polyethylene glycol (MIRALAX/GLYCOLAX) powder    potassium chloride ER (K-DUR/KLOR-CON M) 20 MEQ CR tablet Take 2 tablets (40 mEq) by mouth 4 times daily   Respiratory Therapy Supplies (NEBULIZER COMPRESSOR) KIT 1 Device 4 times daily as needed.   senna (SENOKOT) 8.6 MG tablet Take 1 tablet by mouth 2 times daily   spironolactone (ALDACTONE) 50 MG tablet Take 1 tablet (50 mg) by  "mouth daily   tiotropium (SPIRIVA HANDIHALER) 18 MCG inhalation capsule Inhale contents of one capsule daily.   torsemide (DEMADEX) 100 MG tablet Take 1 tablet (100 mg) by mouth 2 times daily   warfarin (COUMADIN) 5 MG tablet TAKE 3 TABLETS BY MOUTH ON MON, TUES, THURS, SAT, AND SUN AND 4 TABS ON WED AND FRI.   warfarin (COUMADIN) 5 MG tablet [id number] on MTuThSatSun, and 20mg on WF, or as directed by the Medication Monitoring Clinic at the [facility].     No current facility-administered medications on file prior to visit.       ROS:   Constitutional: No fever, chills, or sweats. No weight gain/loss.   ENT: No visual disturbance, ear ache, epistaxis, sore throat.   Allergies/Immunologic: Negative.   Respiratory: No cough, hemoptysis.   Cardiovascular: As per HPI.   GI: No nausea, vomiting, hematemesis, melena, or hematochezia.   : No urinary frequency, dysuria, or hematuria.   Integument: Negative.   Psychiatric: Negative.   Neuro: Negative.   Endocrinology: Negative.   Musculoskeletal: Negative.    EXAM:  /86 (BP Location: Left arm, Patient Position: Chair, Cuff Size: Adult Large)   Pulse 85   Ht 1.702 m (5' 7\")   Wt (!) 227.3 kg (501 lb)   SpO2 99%   BMI 78.47 kg/m    General: Obese female;  appears comfortable, alert and articulate; She gets SOB with getting on exam table  Eyes: anicteric sclera, EOMI  Neck: no adenopathy  Orophyarynx: moist mucosa, no lesions, dentition intact  Heart: regular, S1/S2, no murmur, gallop, rub, cannot see JVP  Lungs: clear, no rales or wheezing  Abdomen: obese; soft, non-tender, bowel sounds present, no hepatomegaly  Extremities: no clubbing, cyanosis or edema  Neurological: normal speech and affect, no gross motor deficits    Labs:  CMP RESULTS:  Lab Results   Component Value Date     03/18/2019    POTASSIUM 4.4 03/18/2019    CHLORIDE 106 03/18/2019    CO2 30 03/18/2019    ANIONGAP 4 03/18/2019     (H) 03/18/2019    BUN 22 03/18/2019    CR 1.17 (H) 03/18/2019 "    GFRESTIMATED 56 (L) 03/18/2019    GFRESTBLACK 65 03/18/2019    JUSTIN 8.8 03/18/2019    BILITOTAL 0.2 10/25/2018    ALBUMIN 2.8 (L) 10/25/2018    ALKPHOS 89 10/25/2018    ALT 19 10/25/2018    AST 9 10/25/2018      Zio Monitor (4/18)  Predominant rhythm is sinus rhythm.   1 run of VT lasting 6 beats at a rate of 200 bpm  AF burdeno f 7%  AF with RVR episode associated with patient symptoms    RHC (2/2017)  RA 13  PA 32/17, 23  PAW 25, 25, 20  CO Catarina: 7.3 (2.6)    Assessment and Plan:   Pricilla is a pleasant 44-year-old woman with NICM and systolic heart failure. To optimize regimen and hopefully improve rate control, increase Toprol to 200 mg daily. She will have an echo and see Dr. Alcala in 1 month.    Pricilla had discontinued smoking but has since resumed smoking occasionally. She was encouraged to stop and assistance was offered.     1. Chronic systolic heart failure secondary to NICM.    Stage C  NYHA Class IIIa  ACEi/ARB yes + hydralazine for afterload (25 mg, 50 mg, 75 mg)  BB yes - Toprol increased to 200 mg daily today  Aldosterone antagonist yes -- spironolactone 50 mg daily  SCD prophylaxis does not meet criteria for implant  % BiV pacing: N/A  Fluid status: euvolemic on massive doses of diuretics, though unclear how reliably she is taking her regimen.    2. AFib: Anticoagulated. Rate control inadequate plus experiencing regular palpitations. Increasing Toprol today.     3. Tobacco use: encouraged to discontinue    4. HTN. Controlled on current          20 minutes spent in direct care, >50% in counseling      CC  Patient Care Team:  Jamilah Bernal MD as PCP - General (Internal Medicine)  Percy Alcala MD as MD (Cardiology)  Silva Damon MD as MD (Internal Medicine)  Norman Jean DPM (Podiatry)  Steph Kim PA-C as Physician Assistant (Physician Assistant)  Delmi Orellana MD as MD (Ophthalmology)  Nelly Miranda MD as MD  (Ophthalmology)  Drea Rosas, RN as Clinic Care Coordinator (Bariatric)  Isela Archibald PA-C as Physician Assistant (Physician Assistant)  Tom Guardado MD as MD (Ophthalmology)  Russel Vergara OD as MD (Optometry)  Ophelia Kenny, RN as Nurse Coordinator (Cardiology)  Kristen Cuello, YOCASTA CNP as Nurse Practitioner (Cardiology)  Ximena Carpenter NP as Nurse Practitioner (Cardiology)  Ximena Carpenter NP as Nurse Practitioner (Nurse Practitioner - Family)  Vladislav Samuel MD as MD (Ophthalmology)  Lauren Resendez [facility] as Pharmacist (Pharmacist Clinician- Clinical Pharmacy Specialist)  Alexandru Wang RN as Specialty Care Coordinator (Cardiology)  SELF, REFERRED

## 2019-03-17 ENCOUNTER — MYC MEDICAL ADVICE (OUTPATIENT)
Dept: ORTHOPEDICS | Facility: CLINIC | Age: 44
End: 2019-03-17

## 2019-03-18 ENCOUNTER — ANCILLARY PROCEDURE (OUTPATIENT)
Dept: GENERAL RADIOLOGY | Facility: CLINIC | Age: 44
End: 2019-03-18
Attending: PODIATRIST
Payer: MEDICARE

## 2019-03-18 ENCOUNTER — ANTICOAGULATION THERAPY VISIT (OUTPATIENT)
Dept: ANTICOAGULATION | Facility: CLINIC | Age: 44
End: 2019-03-18

## 2019-03-18 ENCOUNTER — OFFICE VISIT (OUTPATIENT)
Dept: PODIATRY | Facility: CLINIC | Age: 44
End: 2019-03-18
Payer: MEDICARE

## 2019-03-18 ENCOUNTER — ANCILLARY PROCEDURE (OUTPATIENT)
Dept: ULTRASOUND IMAGING | Facility: CLINIC | Age: 44
End: 2019-03-18
Attending: PODIATRIST
Payer: MEDICARE

## 2019-03-18 VITALS — HEART RATE: 67 BPM | SYSTOLIC BLOOD PRESSURE: 125 MMHG | DIASTOLIC BLOOD PRESSURE: 70 MMHG

## 2019-03-18 DIAGNOSIS — Z79.01 LONG TERM CURRENT USE OF ANTICOAGULANT THERAPY: ICD-10-CM

## 2019-03-18 DIAGNOSIS — E11.49 TYPE II OR UNSPECIFIED TYPE DIABETES MELLITUS WITH NEUROLOGICAL MANIFESTATIONS, NOT STATED AS UNCONTROLLED(250.60) (H): Primary | ICD-10-CM

## 2019-03-18 DIAGNOSIS — I48.91 ATRIAL FIBRILLATION, UNSPECIFIED TYPE (H): ICD-10-CM

## 2019-03-18 DIAGNOSIS — M10.071 ACUTE IDIOPATHIC GOUT OF RIGHT FOOT: ICD-10-CM

## 2019-03-18 DIAGNOSIS — M25.571 ACUTE RIGHT ANKLE PAIN: ICD-10-CM

## 2019-03-18 DIAGNOSIS — M79.671 RIGHT FOOT PAIN: ICD-10-CM

## 2019-03-18 DIAGNOSIS — R60.0 LEG EDEMA, RIGHT: ICD-10-CM

## 2019-03-18 LAB
ANION GAP SERPL CALCULATED.3IONS-SCNC: 4 MMOL/L (ref 3–14)
BASOPHILS # BLD AUTO: 0 10E9/L (ref 0–0.2)
BASOPHILS NFR BLD AUTO: 0.6 %
BUN SERPL-MCNC: 22 MG/DL (ref 7–30)
CALCIUM SERPL-MCNC: 8.8 MG/DL (ref 8.5–10.1)
CHLORIDE SERPL-SCNC: 106 MMOL/L (ref 94–109)
CO2 SERPL-SCNC: 30 MMOL/L (ref 20–32)
CREAT SERPL-MCNC: 1.17 MG/DL (ref 0.52–1.04)
CRP SERPL-MCNC: 39.2 MG/L (ref 0–8)
DIFFERENTIAL METHOD BLD: ABNORMAL
EOSINOPHIL # BLD AUTO: 0.1 10E9/L (ref 0–0.7)
EOSINOPHIL NFR BLD AUTO: 2 %
ERYTHROCYTE [DISTWIDTH] IN BLOOD BY AUTOMATED COUNT: 14 % (ref 10–15)
ERYTHROCYTE [SEDIMENTATION RATE] IN BLOOD BY WESTERGREN METHOD: 31 MM/H (ref 0–20)
GFR SERPL CREATININE-BSD FRML MDRD: 56 ML/MIN/{1.73_M2}
GLUCOSE SERPL-MCNC: 144 MG/DL (ref 70–99)
HCT VFR BLD AUTO: 44 % (ref 35–47)
HGB BLD-MCNC: 13.5 G/DL (ref 11.7–15.7)
IMM GRANULOCYTES # BLD: 0 10E9/L (ref 0–0.4)
IMM GRANULOCYTES NFR BLD: 0.4 %
INR PPP: 3.02 (ref 0.86–1.14)
LYMPHOCYTES # BLD AUTO: 1.3 10E9/L (ref 0.8–5.3)
LYMPHOCYTES NFR BLD AUTO: 25.1 %
MCH RBC QN AUTO: 28.9 PG (ref 26.5–33)
MCHC RBC AUTO-ENTMCNC: 30.7 G/DL (ref 31.5–36.5)
MCV RBC AUTO: 94 FL (ref 78–100)
MONOCYTES # BLD AUTO: 0.2 10E9/L (ref 0–1.3)
MONOCYTES NFR BLD AUTO: 3.6 %
NEUTROPHILS # BLD AUTO: 3.4 10E9/L (ref 1.6–8.3)
NEUTROPHILS NFR BLD AUTO: 68.3 %
PLATELET # BLD AUTO: 263 10E9/L (ref 150–450)
POTASSIUM SERPL-SCNC: 4.4 MMOL/L (ref 3.4–5.3)
RBC # BLD AUTO: 4.67 10E12/L (ref 3.8–5.2)
SODIUM SERPL-SCNC: 140 MMOL/L (ref 133–144)
URATE SERPL-MCNC: 8.6 MG/DL (ref 2.6–6)
WBC # BLD AUTO: 5 10E9/L (ref 4–11)

## 2019-03-18 PROCEDURE — 86140 C-REACTIVE PROTEIN: CPT | Performed by: PODIATRIST

## 2019-03-18 PROCEDURE — 73630 X-RAY EXAM OF FOOT: CPT | Mod: RT | Performed by: RADIOLOGY

## 2019-03-18 PROCEDURE — 93971 EXTREMITY STUDY: CPT | Mod: RT | Performed by: RADIOLOGY

## 2019-03-18 PROCEDURE — 73610 X-RAY EXAM OF ANKLE: CPT | Mod: RT | Performed by: RADIOLOGY

## 2019-03-18 PROCEDURE — 99213 OFFICE O/P EST LOW 20 MIN: CPT | Performed by: PODIATRIST

## 2019-03-18 RX ORDER — OXYCODONE AND ACETAMINOPHEN 5; 325 MG/1; MG/1
1 TABLET ORAL EVERY 6 HOURS PRN
Qty: 15 TABLET | Refills: 0 | Status: SHIPPED | OUTPATIENT
Start: 2019-03-18 | End: 2019-03-23

## 2019-03-18 ASSESSMENT — PAIN SCALES - GENERAL: PAINLEVEL: WORST PAIN (10)

## 2019-03-18 NOTE — PROGRESS NOTES
ANTICOAGULATION FOLLOW-UP CLINIC VISIT    Patient Name:  Pricilla Brown  Date:  3/18/2019  Contact Type:  Telephone    SUBJECTIVE:        OBJECTIVE    INR   Date Value Ref Range Status   03/18/2019 3.02 (H) 0.86 - 1.14 Final     Chromogenic Factor 10   Date Value Ref Range Status   04/08/2017 19 (L) 70 - 130 % Final     Comment:     Therapeutic Range:  A Chromogenic Factor 10 level of approximately 20-40%   inversely correlates with an INR of 2-3 for patients receiving Warfarin.   Chromogenic Factor 10 levels below 20% indicate an INR greater than 3 and   levels above 40% indicate an INR less than 2.         ASSESSMENT / PLAN  INR assessment SUPRA    Recheck INR In: 1 WEEK    INR Location Clinic      Anticoagulation Summary  As of 3/18/2019    INR goal:   2.0-2.5   TTR:   41.1 % (2.7 y)   INR used for dosing:   3.02! (3/18/2019)   Warfarin maintenance plan:   20 mg (5 mg x 4) every Fri; 15 mg (5 mg x 3) all other days   Full warfarin instructions:   3/18: 10 mg; Otherwise 20 mg every Fri; 15 mg all other days   Weekly warfarin total:   110 mg   Plan last modified:   Alecia Jacobson RN (10/24/2018)   Next INR check:   3/25/2019   Priority:   INR   Target end date:   Indefinite    Indications    Atrial fibrillation (H) [I48.91] [I48.91]  Long-term (current) use of anticoagulants [Z79.01] [Z79.01]             Anticoagulation Episode Summary     INR check location:   Clinic Lab    Preferred lab:       Send INR reminders to:   [facility]    Comments:   Contact Patient at [phone]      Anticoagulation Care Providers     Provider Role Specialty Phone number    Percy Alcala MD  Cardiology [phone]            See the Encounter Report to view Anticoagulation Flowsheet and Dosing Calendar (Go to Encounters tab in chart review, and find the Anticoagulation Therapy Visit)  Left message for patient with results and dosing recommendations. Asked patient to call back to report any missed doses, falls,  signs and symptoms of bleeding or clotting, any changes in health, medication, or diet. Asked patient to call back with any questions or concerns.      Dorene Edge RN

## 2019-03-18 NOTE — NURSING NOTE
Pricilla Brown's chief complaint for this visit includes:  Chief Complaint   Patient presents with     RECHECK     right foot and ankle swelling/pain     PCP: Jamilah Bernal    Referring Provider:  No referring provider defined for this encounter.    /70 (BP Location: Right arm, Patient Position: Sitting, Cuff Size: Adult Large)   Pulse 67   Worst Pain (10)     Do you need any medication refills at today's visit? NO     No injury occurred to her right foot. Has taken her alllopurinol as directed and consistently. Increased swelling right ankle outer aspect and the top of the right foot. Has elevating and icing without effect and also hot packs.  Heat makes the pain more bearable. NO increase in warmth noted. Radha Alfred RN

## 2019-03-18 NOTE — PROGRESS NOTES
Past Medical History:   Diagnosis Date     A-fib (H) 2011    on coumadin since 1/13     Asthma     as a kid     Chest pain 2/1/2017     Chronic anticoagulation for a-fib 2/15/2013    INR's followed by coumadin clinic at      Diabetes mellitus (H) 2012     Diastolic heart failure 2/15/2013     Dilated cardiomyopathy (H) 1/8/2013     HTN (hypertension)      Hyperthyroidism     Graves, s/p I131 1/13, now on prednisone and methimazole     Morbid obesity (H)      Pulmonary embolism (H) 1/12    hospitalized in [place], on lovenox/coumadin for a few months but stopped, hypercoag w/u neg per pt     Sleep apnea     using CPAP     Patient Active Problem List   Diagnosis     Chronic atrial fibrillation (HCC)     Dilated cardiomyopathy (H)     Morbid obesity (H)     Diabetes mellitus, type 2 (H)     Chronic diastolic heart failure (H)     Chronic anticoagulation for a-fib     JYOTI (obstructive sleep apnea) CPAP Min Pressure of 13 and Max Pressure of 18     Hypothyroidism following radioiodine therapy     SOB (shortness of breath)     Azotemia     Asthma     Peritonsillar abscess     Plantar fasciitis     Neuropathic pain of lower extremity     Atrial fibrillation (H) [I48.91]     Long-term (current) use of anticoagulants [Z79.01]     Cellulitis     Chest pain     Foot pain     Class 3 obesity due to excess calories with serious comorbidity and body mass index (BMI) greater than or equal to 70 in adult     No past surgical history on file.  Social History     Socioeconomic History     Marital status: Single     Spouse name: Not on file     Number of children: Not on file     Years of education: Not on file     Highest education level: Not on file   Occupational History     Not on file   Social Needs     Financial resource strain: Not on file     Food insecurity:     Worry: Not on file     Inability: Not on file     Transportation needs:     Medical: Not on file     Non-medical: Not on file   Tobacco Use     Smoking status: Light  Tobacco Smoker     Packs/day: 0.25     Years: 13.00     Pack years: 3.25     Types: Cigarettes     Smokeless tobacco: Never Used     Tobacco comment: down to 5 cigs   Substance and Sexual Activity     Alcohol use: No     Drug use: No     Sexual activity: Yes     Partners: Male     Birth control/protection: Condom   Lifestyle     Physical activity:     Days per week: Not on file     Minutes per session: Not on file     Stress: Not on file   Relationships     Social connections:     Talks on phone: Not on file     Gets together: Not on file     Attends Jainism service: Not on file     Active member of club or organization: Not on file     Attends meetings of clubs or organizations: Not on file     Relationship status: Not on file     Intimate partner violence:     Fear of current or ex partner: Not on file     Emotionally abused: Not on file     Physically abused: Not on file     Forced sexual activity: Not on file   Other Topics Concern     Parent/sibling w/ CABG, MI or angioplasty before 65F 55M? Not Asked   Social History Narrative    Single, no children        Gyn:        Last pap several years ago, no abnormal    No STIs            Patient is single.  She is no longer working.  She used to work in the area of customer service.  She is currently living with her sister in [place], [place].  She has no pets.  Patient has smoked a half pack of cigarettes a day for the past 10 plus years.  She states that she is down to 5 cigarettes a day with the aid of Wellbutrin.  She does not smoke cigars, pipes or chew tobacco.  She has 1 cup of coffee in the morning.  She does not drink alcohol.  Patient does not exercise.      Family History   Problem Relation Age of Onset     Thyroid Disease Mother         Grave's D     Diabetes Mother      Heart Disease Mother      Cerebrovascular Disease Mother         dec. 54 yo     Hypertension Mother      Heart Disease Sister         Heart Murmur     Diabetes Sister       Heart Disease Father         dec in his 30s, MI     Psychotic Disorder Brother         Bipolar     Diabetes Brother      Thyroid Disease Brother         Hyper Thyroid     Heart Disease Brother      Thyroid Disease Sister         Hyper Thyroid     Thyroid Disease Sister         Hyper Thyroid     Thyroid Disease Sister         Mental Health Problems     Thyroid Disease Maternal Aunt      Thyroid Disease Maternal Uncle      Cancer No family hx of      Glaucoma No family hx of      Macular Degeneration No family hx of      Lab Results   Component Value Date    A1C 8.4 10/11/2018    A1C 9.6 05/29/2018    A1C 9.0 04/07/2017    A1C 12.1 03/10/2016    A1C >16.0 09/30/2014     Inr                                2.42          3/13/2019  Last Comprehensive Metabolic Panel:  Sodium   Date Value Ref Range Status   03/13/2019 138 133 - 144 mmol/L Final     Potassium   Date Value Ref Range Status   03/13/2019 3.6 3.4 - 5.3 mmol/L Final     Chloride   Date Value Ref Range Status   03/13/2019 102 94 - 109 mmol/L Final     Carbon Dioxide   Date Value Ref Range Status   03/13/2019 30 20 - 32 mmol/L Final     Anion Gap   Date Value Ref Range Status   03/13/2019 6 3 - 14 mmol/L Final     Glucose   Date Value Ref Range Status   03/13/2019 175 (H) 70 - 99 mg/dL Final     Urea Nitrogen   Date Value Ref Range Status   03/13/2019 19 7 - 30 mg/dL Final     Creatinine   Date Value Ref Range Status   03/13/2019 1.21 (H) 0.52 - 1.04 mg/dL Final     GFR Estimate   Date Value Ref Range Status   03/13/2019 54 (L) >60 mL/min/[1.73_m2] Final     Comment:     Non  GFR Calc  Starting 12/18/2018, serum creatinine based estimated GFR (eGFR) will be   calculated using the Chronic Kidney Disease Epidemiology Collaboration   (CKD-EPI) equation.       Calcium   Date Value Ref Range Status   03/13/2019 8.7 8.5 - 10.1 mg/dL Final     Lab Results   Component Value Date    AST 9 10/25/2018     Lab Results   Component Value Date    ALT 19  10/25/2018     Lab Results   Component Value Date    BILICONJ 0.0 10/17/2013      Lab Results   Component Value Date    BILITOTAL 0.2 10/25/2018     Lab Results   Component Value Date    ALBUMIN 2.8 10/25/2018     Lab Results   Component Value Date    PROTTOTAL 8.6 10/25/2018      Lab Results   Component Value Date    ALKPHOS 89 10/25/2018     Uric Acid   Date Value Ref Range Status   05/31/2017 9.4 (H) 2.6 - 6.0 mg/dL Final     Lab Results   Component Value Date    WBC 5.0 03/18/2019     Lab Results   Component Value Date    RBC 4.67 03/18/2019     Lab Results   Component Value Date    HGB 13.5 03/18/2019     Lab Results   Component Value Date    HCT 44.0 03/18/2019     No components found for: MCT  Lab Results   Component Value Date    MCV 94 03/18/2019     Lab Results   Component Value Date    MCH 28.9 03/18/2019     Lab Results   Component Value Date    MCHC 30.7 03/18/2019     Lab Results   Component Value Date    RDW 14.0 03/18/2019     Lab Results   Component Value Date     03/18/2019     Last Comprehensive Metabolic Panel:  Sodium   Date Value Ref Range Status   03/18/2019 140 133 - 144 mmol/L Final     Potassium   Date Value Ref Range Status   03/18/2019 4.4 3.4 - 5.3 mmol/L Final     Chloride   Date Value Ref Range Status   03/18/2019 106 94 - 109 mmol/L Final     Carbon Dioxide   Date Value Ref Range Status   03/18/2019 30 20 - 32 mmol/L Final     Anion Gap   Date Value Ref Range Status   03/18/2019 4 3 - 14 mmol/L Final     Glucose   Date Value Ref Range Status   03/18/2019 144 (H) 70 - 99 mg/dL Final     Comment:     Non Fasting     Urea Nitrogen   Date Value Ref Range Status   03/18/2019 22 7 - 30 mg/dL Final     Creatinine   Date Value Ref Range Status   03/18/2019 1.17 (H) 0.52 - 1.04 mg/dL Final     GFR Estimate   Date Value Ref Range Status   03/18/2019 56 (L) >60 mL/min/[1.73_m2] Final     Comment:     Non  GFR Calc  Starting 12/18/2018, serum creatinine based estimated GFR  (eGFR) will be   calculated using the Chronic Kidney Disease Epidemiology Collaboration   (CKD-EPI) equation.       Calcium   Date Value Ref Range Status   03/18/2019 8.8 8.5 - 10.1 mg/dL Final     Lab Results   Component Value Date    SED 31 03/18/2019     Lab Results   Component Value Date    CRP 39.2 03/18/2019     Uric Acid   Date Value Ref Range Status   03/18/2019 8.6 (H) 2.6 - 6.0 mg/dL Final     Inr                     3.02       3/18/2019  SUBJECTIVE FINDINGS:  44-year-old female returns to clinic for right ankle pain.  She relates it hurts in the anterior ankle, medial ankle, lateral ankle, back of the ankle and leg, top of foot.  She relates this is the third day.  It started 2-3 days ago.  Yesterday it started going back up her leg and into her back.  She relates no injuries.  Ice makes it worse.  Heat kind of calms it down a bit.  She has had no specific treatment.  She relates she has had some chills.  No nausea, vomiting or fevers.  No specific injuries.  She relates it has been swelling a lot.  She relates she is on colchicine and Coumadin and she has been taking those without interruption as directed.  She does have a history of gout.  Presents in her chair today.      OBJECTIVE FINDINGS:  DP and PT are 2/4 right.  She has right foot ankle and leg edema.  There is pain on palpation of the ankle and foot generally.  It is very painful to palpation.  She is a bit guarding to range of motion.  No erythema, no calor, no drainage, there is edema.  No gross tendon voids.  X-rays and labs reviewed with patient in clinic today.        X-RAYS:  Joint and cortical margins are intact.  She has some mid foot osteoarthritic changes, some talar dome cartilage changes noted.  No fractures.         LABORATORY:  Sed rate, CRP and uric acid are elevated.        ASSESSMENT/PLAN:  Gout, right foot and ankle.  Foot and ankle pain right.  Sciatic symptoms right.  Diagnosis and treatment options discussed with patient.   She is diabetic with peripheral neuropathy.  Rule out Charcot foot.  Rule out infection.  She does have some mild tinea pedis and onychomycosis changes.  No open lesions noted.  Diagnosis and treatment options discussed with her.  Prescription for Percocet given and use discussed with her.  She cannot take NSAIDs due to allergies.  She is on Coumadin.  I am going to give her a referral to Rheumatology and back to her primary physician for gout management.  I gave her a referral to Orthopedics to have her back looked at to rule out sciatica symptoms.  Ace wraps dispensed and use discussed with her for the edema.  She will return to clinic and see me in about 1 week.  She did relate she had tried some Voltaren gel as well and that did not help.  Doppler was negative for DVT.

## 2019-03-18 NOTE — LETTER
3/18/2019         RE: Pricilla Brown  [address]  [facility] [address]        Dear Colleague,    Thank you for referring your patient, Pricilla Brown, to the [facility]. Please see a copy of my visit note below.    Past Medical History:   Diagnosis Date     A-fib (H) 2011    on coumadin since 1/13     Asthma     as a kid     Chest pain 2/1/2017     Chronic anticoagulation for a-fib 2/15/2013    INR's followed by coumadin clinic at      Diabetes mellitus (H) 2012     Diastolic heart failure 2/15/2013     Dilated cardiomyopathy (H) 1/8/2013     HTN (hypertension)      Hyperthyroidism     Graves, s/p I131 1/13, now on prednisone and methimazole     Morbid obesity (H)      Pulmonary embolism (H) 1/12    hospitalized in [place], on lovenox/coumadin for a few months but stopped, hypercoag w/u neg per pt     Sleep apnea     using CPAP     Patient Active Problem List   Diagnosis     Chronic atrial fibrillation (HCC)     Dilated cardiomyopathy (H)     Morbid obesity (H)     Diabetes mellitus, type 2 (H)     Chronic diastolic heart failure (H)     Chronic anticoagulation for a-fib     JYOTI (obstructive sleep apnea) CPAP Min Pressure of 13 and Max Pressure of 18     Hypothyroidism following radioiodine therapy     SOB (shortness of breath)     Azotemia     Asthma     Peritonsillar abscess     Plantar fasciitis     Neuropathic pain of lower extremity     Atrial fibrillation (H) [I48.91]     Long-term (current) use of anticoagulants [Z79.01]     Cellulitis     Chest pain     Foot pain     Class 3 obesity due to excess calories with serious comorbidity and body mass index (BMI) greater than or equal to 70 in adult     No past surgical history on file.  Social History     Socioeconomic History     Marital status: Single     Spouse name: Not on file     Number of children: Not on file     Years of education: Not on file     Highest education level: Not on file   Occupational History     Not on file    Social Needs     Financial resource strain: Not on file     Food insecurity:     Worry: Not on file     Inability: Not on file     Transportation needs:     Medical: Not on file     Non-medical: Not on file   Tobacco Use     Smoking status: Light Tobacco Smoker     Packs/day: 0.25     Years: 13.00     Pack years: 3.25     Types: Cigarettes     Smokeless tobacco: Never Used     Tobacco comment: down to 5 cigs   Substance and Sexual Activity     Alcohol use: No     Drug use: No     Sexual activity: Yes     Partners: Male     Birth control/protection: Condom   Lifestyle     Physical activity:     Days per week: Not on file     Minutes per session: Not on file     Stress: Not on file   Relationships     Social connections:     Talks on phone: Not on file     Gets together: Not on file     Attends Congregational service: Not on file     Active member of club or organization: Not on file     Attends meetings of clubs or organizations: Not on file     Relationship status: Not on file     Intimate partner violence:     Fear of current or ex partner: Not on file     Emotionally abused: Not on file     Physically abused: Not on file     Forced sexual activity: Not on file   Other Topics Concern     Parent/sibling w/ CABG, MI or angioplasty before 65F 55M? Not Asked   Social History Narrative    Single, no children        Gyn:        Last pap several years ago, no abnormal    No STIs            Patient is single.  She is no longer working.  She used to work in the area of customer service.  She is currently living with her sister in [place], [place].  She has no pets.  Patient has smoked a half pack of cigarettes a day for the past 10 plus years.  She states that she is down to 5 cigarettes a day with the aid of Wellbutrin.  She does not smoke cigars, pipes or chew tobacco.  She has 1 cup of coffee in the morning.  She does not drink alcohol.  Patient does not exercise.      Family History   Problem Relation Age of  Onset     Thyroid Disease Mother         Grave's D     Diabetes Mother      Heart Disease Mother      Cerebrovascular Disease Mother         dec. 54 yo     Hypertension Mother      Heart Disease Sister         Heart Murmur     Diabetes Sister      Heart Disease Father         dec in his 30s, MI     Psychotic Disorder Brother         Bipolar     Diabetes Brother      Thyroid Disease Brother         Hyper Thyroid     Heart Disease Brother      Thyroid Disease Sister         Hyper Thyroid     Thyroid Disease Sister         Hyper Thyroid     Thyroid Disease Sister         Mental Health Problems     Thyroid Disease Maternal Aunt      Thyroid Disease Maternal Uncle      Cancer No family hx of      Glaucoma No family hx of      Macular Degeneration No family hx of      Lab Results   Component Value Date    A1C 8.4 10/11/2018    A1C 9.6 05/29/2018    A1C 9.0 04/07/2017    A1C 12.1 03/10/2016    A1C >16.0 09/30/2014     Inr                                2.42          3/13/2019  Last Comprehensive Metabolic Panel:  Sodium   Date Value Ref Range Status   03/13/2019 138 133 - 144 mmol/L Final     Potassium   Date Value Ref Range Status   03/13/2019 3.6 3.4 - 5.3 mmol/L Final     Chloride   Date Value Ref Range Status   03/13/2019 102 94 - 109 mmol/L Final     Carbon Dioxide   Date Value Ref Range Status   03/13/2019 30 20 - 32 mmol/L Final     Anion Gap   Date Value Ref Range Status   03/13/2019 6 3 - 14 mmol/L Final     Glucose   Date Value Ref Range Status   03/13/2019 175 (H) 70 - 99 mg/dL Final     Urea Nitrogen   Date Value Ref Range Status   03/13/2019 19 7 - 30 mg/dL Final     Creatinine   Date Value Ref Range Status   03/13/2019 1.21 (H) 0.52 - 1.04 mg/dL Final     GFR Estimate   Date Value Ref Range Status   03/13/2019 54 (L) >60 mL/min/[1.73_m2] Final     Comment:     Non  GFR Calc  Starting 12/18/2018, serum creatinine based estimated GFR (eGFR) will be   calculated using the Chronic Kidney Disease  Epidemiology Collaboration   (CKD-EPI) equation.       Calcium   Date Value Ref Range Status   03/13/2019 8.7 8.5 - 10.1 mg/dL Final     Lab Results   Component Value Date    AST 9 10/25/2018     Lab Results   Component Value Date    ALT 19 10/25/2018     Lab Results   Component Value Date    BILICONJ 0.0 10/17/2013      Lab Results   Component Value Date    BILITOTAL 0.2 10/25/2018     Lab Results   Component Value Date    ALBUMIN 2.8 10/25/2018     Lab Results   Component Value Date    PROTTOTAL 8.6 10/25/2018      Lab Results   Component Value Date    ALKPHOS 89 10/25/2018     Uric Acid   Date Value Ref Range Status   05/31/2017 9.4 (H) 2.6 - 6.0 mg/dL Final     Lab Results   Component Value Date    WBC 5.0 03/18/2019     Lab Results   Component Value Date    RBC 4.67 03/18/2019     Lab Results   Component Value Date    HGB 13.5 03/18/2019     Lab Results   Component Value Date    HCT 44.0 03/18/2019     No components found for: MCT  Lab Results   Component Value Date    MCV 94 03/18/2019     Lab Results   Component Value Date    MCH 28.9 03/18/2019     Lab Results   Component Value Date    MCHC 30.7 03/18/2019     Lab Results   Component Value Date    RDW 14.0 03/18/2019     Lab Results   Component Value Date     03/18/2019     Last Comprehensive Metabolic Panel:  Sodium   Date Value Ref Range Status   03/18/2019 140 133 - 144 mmol/L Final     Potassium   Date Value Ref Range Status   03/18/2019 4.4 3.4 - 5.3 mmol/L Final     Chloride   Date Value Ref Range Status   03/18/2019 106 94 - 109 mmol/L Final     Carbon Dioxide   Date Value Ref Range Status   03/18/2019 30 20 - 32 mmol/L Final     Anion Gap   Date Value Ref Range Status   03/18/2019 4 3 - 14 mmol/L Final     Glucose   Date Value Ref Range Status   03/18/2019 144 (H) 70 - 99 mg/dL Final     Comment:     Non Fasting     Urea Nitrogen   Date Value Ref Range Status   03/18/2019 22 7 - 30 mg/dL Final     Creatinine   Date Value Ref Range Status    03/18/2019 1.17 (H) 0.52 - 1.04 mg/dL Final     GFR Estimate   Date Value Ref Range Status   03/18/2019 56 (L) >60 mL/min/[1.73_m2] Final     Comment:     Non  GFR Calc  Starting 12/18/2018, serum creatinine based estimated GFR (eGFR) will be   calculated using the Chronic Kidney Disease Epidemiology Collaboration   (CKD-EPI) equation.       Calcium   Date Value Ref Range Status   03/18/2019 8.8 8.5 - 10.1 mg/dL Final     Lab Results   Component Value Date    SED 31 03/18/2019     Lab Results   Component Value Date    CRP 39.2 03/18/2019     Uric Acid   Date Value Ref Range Status   03/18/2019 8.6 (H) 2.6 - 6.0 mg/dL Final     Inr                     3.02       3/18/2019  SUBJECTIVE FINDINGS:  44-year-old female returns to clinic for right ankle pain.  She relates it hurts in the anterior ankle, medial ankle, lateral ankle, back of the ankle and leg, top of foot.  She relates this is the third day.  It started 2-3 days ago.  Yesterday it started going back up her leg and into her back.  She relates no injuries.  Ice makes it worse.  Heat kind of calms it down a bit.  She has had no specific treatment.  She relates she has had some chills.  No nausea, vomiting or fevers.  No specific injuries.  She relates it has been swelling a lot.  She relates she is on colchicine and Coumadin and she has been taking those without interruption as directed.  She does have a history of gout.  Presents in her chair today.      OBJECTIVE FINDINGS:  DP and PT are 2/4 right.  She has right foot ankle and leg edema.  There is pain on palpation of the ankle and foot generally.  It is very painful to palpation.  She is a bit guarding to range of motion.  No erythema, no calor, no drainage, there is edema.  No gross tendon voids.  X-rays and labs reviewed with patient in clinic today.        X-RAYS:  Joint and cortical margins are intact.  She has some mid foot osteoarthritic changes, some talar dome cartilage changes  noted.  No fractures.         LABORATORY:  Sed rate, CRP and uric acid are elevated.        ASSESSMENT/PLAN:  Gout, right foot and ankle.  Foot and ankle pain right.  Sciatic symptoms right.  Diagnosis and treatment options discussed with patient.  She is diabetic with peripheral neuropathy.  Rule out Charcot foot.  Rule out infection.  She does have some mild tinea pedis and onychomycosis changes.  No open lesions noted.  Diagnosis and treatment options discussed with her.  Prescription for Percocet given and use discussed with her.  She cannot take NSAIDs due to allergies.  She is on Coumadin.  I am going to give her a referral to Rheumatology and back to her primary physician for gout management.  I gave her a referral to Orthopedics to have her back looked at to rule out sciatica symptoms.  Ace wraps dispensed and use discussed with her for the edema.  She will return to clinic and see me in about 1 week.  She did relate she had tried some Voltaren gel as well and that did not help.  Doppler was negative for DVT.         Again, thank you for allowing me to participate in the care of your patient.        Sincerely,        Norman Jean DPM

## 2019-03-25 ENCOUNTER — DOCUMENTATION ONLY (OUTPATIENT)
Dept: CARE COORDINATION | Facility: CLINIC | Age: 44
End: 2019-03-25

## 2019-04-11 ENCOUNTER — MYC REFILL (OUTPATIENT)
Dept: ENDOCRINOLOGY | Facility: CLINIC | Age: 44
End: 2019-04-11

## 2019-04-11 ENCOUNTER — MYC REFILL (OUTPATIENT)
Dept: INTERNAL MEDICINE | Facility: CLINIC | Age: 44
End: 2019-04-11

## 2019-04-11 ENCOUNTER — MYC REFILL (OUTPATIENT)
Dept: ORTHOPEDICS | Facility: CLINIC | Age: 44
End: 2019-04-11

## 2019-04-11 DIAGNOSIS — L02.221 FURUNCLE OF ABDOMINAL WALL: ICD-10-CM

## 2019-04-11 DIAGNOSIS — E11.42 TYPE 2 DIABETES MELLITUS WITH DIABETIC POLYNEUROPATHY, WITHOUT LONG-TERM CURRENT USE OF INSULIN (H): ICD-10-CM

## 2019-04-11 DIAGNOSIS — Z79.4 TYPE 2 DIABETES MELLITUS WITH HYPERGLYCEMIA, WITH LONG-TERM CURRENT USE OF INSULIN (H): ICD-10-CM

## 2019-04-11 DIAGNOSIS — B35.1 DERMATOPHYTOSIS OF NAIL: ICD-10-CM

## 2019-04-11 DIAGNOSIS — E11.65 TYPE 2 DIABETES MELLITUS WITH HYPERGLYCEMIA, WITH LONG-TERM CURRENT USE OF INSULIN (H): ICD-10-CM

## 2019-04-11 DIAGNOSIS — M79.672 LEFT FOOT PAIN: ICD-10-CM

## 2019-04-11 DIAGNOSIS — M10.9 ACUTE GOUTY ARTHRITIS: ICD-10-CM

## 2019-04-11 DIAGNOSIS — J45.20 MILD INTERMITTENT ASTHMA WITHOUT COMPLICATION: ICD-10-CM

## 2019-04-11 DIAGNOSIS — B35.1 ONYCHOMYCOSIS: ICD-10-CM

## 2019-04-11 DIAGNOSIS — E11.9 DIABETES MELLITUS, TYPE 2 (H): ICD-10-CM

## 2019-04-11 DIAGNOSIS — E66.01 MORBID OBESITY (H): ICD-10-CM

## 2019-04-11 DIAGNOSIS — B35.3 TINEA PEDIS OF RIGHT FOOT: ICD-10-CM

## 2019-04-11 DIAGNOSIS — M79.2 NEUROPATHIC PAIN OF LOWER EXTREMITY, UNSPECIFIED LATERALITY: ICD-10-CM

## 2019-04-11 DIAGNOSIS — Z72.0 TOBACCO ABUSE: ICD-10-CM

## 2019-04-11 DIAGNOSIS — B35.3 TINEA PEDIS OF BOTH FEET: ICD-10-CM

## 2019-04-11 DIAGNOSIS — E03.8 OTHER SPECIFIED HYPOTHYROIDISM: ICD-10-CM

## 2019-04-11 RX ORDER — ACETAMINOPHEN 325 MG/1
650 TABLET ORAL 3 TIMES DAILY PRN
Qty: 180 TABLET | Refills: 5 | Status: CANCELLED | OUTPATIENT
Start: 2019-04-11

## 2019-04-11 RX ORDER — NICOTINE 21 MG/24HR
1 PATCH, TRANSDERMAL 24 HOURS TRANSDERMAL EVERY 24 HOURS
Qty: 30 PATCH | Refills: 1 | Status: CANCELLED | OUTPATIENT
Start: 2019-04-11

## 2019-04-11 RX ORDER — BUPROPION HYDROCHLORIDE 100 MG/1
100 TABLET, EXTENDED RELEASE ORAL 2 TIMES DAILY
Qty: 180 TABLET | Refills: 2 | Status: CANCELLED | OUTPATIENT
Start: 2019-04-11

## 2019-04-11 RX ORDER — NICOTINE 21 MG/24HR
1 PATCH, TRANSDERMAL 24 HOURS TRANSDERMAL EVERY 24 HOURS
Qty: 90 PATCH | Refills: 2 | Status: CANCELLED | OUTPATIENT
Start: 2019-04-11

## 2019-04-12 RX ORDER — COLCHICINE 0.6 MG/1
.6-1.2 TABLET ORAL DAILY PRN
Qty: 180 TABLET | Refills: 0 | Status: SHIPPED | OUTPATIENT
Start: 2019-04-12 | End: 2019-07-24

## 2019-04-12 RX ORDER — CLOTRIMAZOLE 1 %
CREAM (GRAM) TOPICAL 2 TIMES DAILY
Qty: 30 G | Refills: 1 | Status: SHIPPED | OUTPATIENT
Start: 2019-04-12 | End: 2019-07-23

## 2019-04-12 RX ORDER — CICLOPIROX 80 MG/ML
SOLUTION TOPICAL DAILY
Qty: 6.6 ML | Refills: 0 | Status: SHIPPED | OUTPATIENT
Start: 2019-04-12 | End: 2020-08-09

## 2019-04-12 RX ORDER — INSULIN ASPART 100 [IU]/ML
INJECTION, SOLUTION INTRAVENOUS; SUBCUTANEOUS
Qty: 180 ML | Refills: 3 | Status: SHIPPED | OUTPATIENT
Start: 2019-04-12 | End: 2020-03-30

## 2019-04-12 RX ORDER — CAPSAICIN 0.025 %
1 CREAM (GRAM) TOPICAL 3 TIMES DAILY PRN
Qty: 1 TUBE | Refills: 0 | Status: SHIPPED | OUTPATIENT
Start: 2019-04-12 | End: 2019-07-24

## 2019-04-12 RX ORDER — ALBUTEROL SULFATE 90 UG/1
2 AEROSOL, METERED RESPIRATORY (INHALATION) EVERY 6 HOURS PRN
Qty: 75 G | Refills: 0 | Status: SHIPPED | OUTPATIENT
Start: 2019-04-12 | End: 2020-04-02

## 2019-04-12 RX ORDER — TOPIRAMATE 50 MG/1
100 TABLET, FILM COATED ORAL DAILY
Qty: 270 TABLET | Refills: 3 | Status: SHIPPED | OUTPATIENT
Start: 2019-04-12 | End: 2019-07-24

## 2019-04-12 RX ORDER — CICLOPIROX OLAMINE 7.7 MG/G
CREAM TOPICAL 2 TIMES DAILY
Qty: 90 G | Refills: 6 | Status: SHIPPED | OUTPATIENT
Start: 2019-04-12 | End: 2020-08-09

## 2019-04-12 RX ORDER — NYSTATIN 100000 U/G
CREAM TOPICAL 2 TIMES DAILY
Qty: 90 G | Refills: 6 | Status: SHIPPED | OUTPATIENT
Start: 2019-04-12 | End: 2020-04-05

## 2019-04-12 RX ORDER — GABAPENTIN 300 MG/1
600 CAPSULE ORAL 2 TIMES DAILY
Qty: 120 CAPSULE | Refills: 0 | Status: SHIPPED | OUTPATIENT
Start: 2019-04-12 | End: 2019-05-14

## 2019-04-12 RX ORDER — LEVOTHYROXINE SODIUM 200 UG/1
TABLET ORAL
Qty: 102 TABLET | Refills: 1 | Status: SHIPPED | OUTPATIENT
Start: 2019-04-12 | End: 2020-01-14

## 2019-04-15 ENCOUNTER — MYC REFILL (OUTPATIENT)
Dept: CARDIOLOGY | Facility: CLINIC | Age: 44
End: 2019-04-15

## 2019-04-15 ENCOUNTER — MYC REFILL (OUTPATIENT)
Dept: INTERNAL MEDICINE | Facility: CLINIC | Age: 44
End: 2019-04-15

## 2019-04-15 DIAGNOSIS — M79.2 NEUROPATHIC PAIN OF LOWER EXTREMITY, UNSPECIFIED LATERALITY: ICD-10-CM

## 2019-04-15 DIAGNOSIS — E87.6 HYPOKALEMIA: ICD-10-CM

## 2019-04-15 DIAGNOSIS — Z72.0 TOBACCO ABUSE: ICD-10-CM

## 2019-04-15 DIAGNOSIS — I42.0 DILATED CARDIOMYOPATHY (H): ICD-10-CM

## 2019-04-15 DIAGNOSIS — I50.32 CHRONIC DIASTOLIC HEART FAILURE (H): ICD-10-CM

## 2019-04-15 RX ORDER — TORSEMIDE 100 MG/1
100 TABLET ORAL 2 TIMES DAILY
Qty: 180 TABLET | Refills: 1 | Status: CANCELLED | OUTPATIENT
Start: 2019-04-15

## 2019-04-15 RX ORDER — ACETAMINOPHEN 325 MG/1
650 TABLET ORAL 3 TIMES DAILY PRN
Qty: 180 TABLET | Refills: 5 | Status: SHIPPED | OUTPATIENT
Start: 2019-04-15 | End: 2019-07-24

## 2019-04-15 RX ORDER — HYDRALAZINE HYDROCHLORIDE 25 MG/1
TABLET, FILM COATED ORAL
Qty: 540 TABLET | Refills: 3 | Status: CANCELLED | OUTPATIENT
Start: 2019-04-15

## 2019-04-15 RX ORDER — BUPROPION HYDROCHLORIDE 100 MG/1
100 TABLET, EXTENDED RELEASE ORAL 2 TIMES DAILY
Qty: 180 TABLET | Refills: 2 | OUTPATIENT
Start: 2019-04-15

## 2019-04-15 RX ORDER — POTASSIUM CHLORIDE 1500 MG/1
40 TABLET, EXTENDED RELEASE ORAL 4 TIMES DAILY
Qty: 240 TABLET | Refills: 11 | Status: CANCELLED | OUTPATIENT
Start: 2019-04-15

## 2019-04-22 RX ORDER — CLOTRIMAZOLE 1 %
CREAM (GRAM) TOPICAL 2 TIMES DAILY PRN
Qty: 60 G | Refills: 1 | OUTPATIENT
Start: 2019-04-22

## 2019-05-01 ENCOUNTER — DOCUMENTATION ONLY (OUTPATIENT)
Dept: CARE COORDINATION | Facility: CLINIC | Age: 44
End: 2019-05-01

## 2019-05-01 NOTE — PROGRESS NOTES
Addendum 5/1/19    Spoke with Pricilla today. She said she has not been feeling well. Flu like symptoms, but is feeling better now. No new medications. Has been taking her warfarin.    Mitchel Loyd [facility]

## 2019-05-14 ENCOUNTER — DOCUMENTATION ONLY (OUTPATIENT)
Dept: CARE COORDINATION | Facility: CLINIC | Age: 44
End: 2019-05-14

## 2019-05-14 DIAGNOSIS — Z79.4 TYPE 2 DIABETES MELLITUS WITH HYPERGLYCEMIA, WITH LONG-TERM CURRENT USE OF INSULIN (H): ICD-10-CM

## 2019-05-14 DIAGNOSIS — E11.65 TYPE 2 DIABETES MELLITUS WITH HYPERGLYCEMIA, WITH LONG-TERM CURRENT USE OF INSULIN (H): ICD-10-CM

## 2019-05-14 NOTE — TELEPHONE ENCOUNTER
RX: gabapentin (NEURONTIN) 300 MG capsule    Pt called to have this RX refilled, as she is out of the medication and it helps with her neuropathy.    Please follow up with pt when [place] is [place] [place] [address] [place] [address]  Thank you!

## 2019-05-14 NOTE — TELEPHONE ENCOUNTER
[facility] Call Center    Phone Message    May a detailed message be left on voicemail: yes    Reason for Call: Other: PT is requesting that her rx, gabapentin be sent over to pharmacy asap (today). I notify pt that normal turn around for med refill is up to 72 hrs. Pt isn't happy with that and says that she is completely out of this med and says that it helps with her neuropathy. Pt request to speak to someone in the clinic asap. Please call pt back to advise.     Action Taken: Message routed to:  [facility] akshat

## 2019-05-15 DIAGNOSIS — Z79.4 TYPE 2 DIABETES MELLITUS WITH HYPERGLYCEMIA, WITH LONG-TERM CURRENT USE OF INSULIN (H): ICD-10-CM

## 2019-05-15 DIAGNOSIS — E11.65 TYPE 2 DIABETES MELLITUS WITH HYPERGLYCEMIA, WITH LONG-TERM CURRENT USE OF INSULIN (H): ICD-10-CM

## 2019-05-15 RX ORDER — GABAPENTIN 300 MG/1
CAPSULE ORAL
Qty: 120 CAPSULE | Refills: 0 | Status: SHIPPED | OUTPATIENT
Start: 2019-05-15 | End: 2019-06-04

## 2019-05-15 NOTE — TELEPHONE ENCOUNTER
gabapentin (NEURONTIN) 300 MG capsule      Last Written Prescription Date:  4-12-19  Last Fill Quantity: 120,   # refills: 0  Last Office Visit : 2-19-19  Future Office visit:  5-31-19    Routing refill request to provider for review/approval because:  Med not on protocol.

## 2019-05-19 NOTE — TELEPHONE ENCOUNTER
dulaglutide (TRULICITY) 1.5 MG/0.5ML pen    Last Written Prescription Date:  2/15/19  Last Fill Quantity: 6ml,   # refills: 0  Last Office Visit : 2/19/19  Future Office visit:  5/31/19      Routing refill request to provider for review/approval because: microalbumin, A1C, creat H

## 2019-06-04 DIAGNOSIS — E11.65 TYPE 2 DIABETES MELLITUS WITH HYPERGLYCEMIA, WITH LONG-TERM CURRENT USE OF INSULIN (H): ICD-10-CM

## 2019-06-04 DIAGNOSIS — Z79.4 TYPE 2 DIABETES MELLITUS WITH HYPERGLYCEMIA, WITH LONG-TERM CURRENT USE OF INSULIN (H): ICD-10-CM

## 2019-06-05 RX ORDER — GABAPENTIN 300 MG/1
CAPSULE ORAL
Qty: 360 CAPSULE | Refills: 0 | Status: SHIPPED | OUTPATIENT
Start: 2019-06-05 | End: 2019-09-03

## 2019-06-05 NOTE — TELEPHONE ENCOUNTER
Isela Archibald PA-C  You 36 minutes ago (9:27 AM)      90 day supply is ok.   Thelma    Routing comment          90 day supply order sent to the pharmacy.      Kathleen M Doege RN

## 2019-06-11 DIAGNOSIS — G47.33 OBSTRUCTIVE SLEEP APNEA (ADULT) (PEDIATRIC): Primary | ICD-10-CM

## 2019-06-13 ENCOUNTER — ANTICOAGULATION THERAPY VISIT (OUTPATIENT)
Dept: ANTICOAGULATION | Facility: CLINIC | Age: 44
End: 2019-06-13

## 2019-06-13 DIAGNOSIS — I48.91 ATRIAL FIBRILLATION (H): ICD-10-CM

## 2019-06-13 NOTE — PROGRESS NOTES
[facility]     [facility]

## 2019-06-13 NOTE — PROGRESS NOTES
Spoke with Pricilla today. She has not been feeling well. She will get an INR at her next  appointment with Dr Alcala.    Mitchel Loyd [facility]

## 2019-06-21 DIAGNOSIS — E11.9 TYPE 2 DIABETES MELLITUS (H): Primary | ICD-10-CM

## 2019-06-21 NOTE — TELEPHONE ENCOUNTER
[facility] Call Center    Phone Message    May a detailed message be left on voicemail: no    Reason for Call: Medication Refill Request    Has the patient contacted the pharmacy for the refill? Yes   Name of medication being requested: blood glucose (NO BRAND SPECIFIED) test strip  Provider who prescribed the medication: Isela Archibald  Pharmacy: [address] [phone]  Date medication is needed: ASAP    **Patient stated that she has been checking her blood sugars 4 times a day instead of the 3 times a day as prescribed. Pt stated that she has one test strip left, and requesting that a Rx be sent over with a dose increase to 4 times a day, as suggested per pharmacist, so that she can jerardo more.**      Action Taken: Message routed to:  [facility] ([facility] Diabetes and Endo

## 2019-06-25 NOTE — PROGRESS NOTES
"  Bariatric Nutrition Consultation Note    Reason For Visit: Nutrition Reassessment    Pricilla Brown is a 44 year-old (type 2 DM on insulin sliding scale) presenting today for bariatric nutrition consult.  Pt is interested in laparoscopic sleeve gastrectomy.  Pt has met all required RD visits prior to surgery.  Pt was referred by NANETTE Kerr and Dr. Silva Damon.    Patient signed ABN which is good from 9/20/18-9/20/19.  Patient with a history of diabetes.    ANTHROPOMETRICS:  Ht: 67\"  Initial Wt: 462.6 lbs with BMI of 72.6  Current Wt: 498 lbs (-2.9 lbs over the past month; +35.4 lbs from initial weight)     Required weight loss goal pre-op: -46 lbs from initial consult weight (goal weight 416.6 lbs or less before surgery)    NUTRITION HISTORY:  Food Allergies/Intolerances: none known  Cravings: sweets, chips, pop (diet)  Triggers/cues causing extra eating: boredom (currently on disability, not working)  *Pt is on topiramate per Dr. Silva Damon   3/13/19: Patient plans to start Jardiance - she has not picked it up from the pharmacy yet but plans to pick it up today.    Recent food recall:  Breakfast: bagel and cream cheese, ham, two eggs and bread  Lunch: chicken anu pasta,  Dinner: chicken and mac and cheese  Snacks: granola bar, popcorn(skinny pop) fruit(multiple per day)  Beverages: water, 7 up(once), SF bevereages  Not using protein shake for the past month    Progress with previous goals:  Relating To Eating:  - Include lean protein at each meal met/continues   -Follow the Modified Liquid Diet for weight loss: not following recently      -Continue eating slowly (20-30 minutes per meal), chewing foods well (25 chews per bite/applesauce consistency) met/continues     - Separate beverages from meals, no drinking 30 minutes before, during, and 30 minutes after meals not doing yet     -Increase exercise as able.  (Pool therapy, walking, etc) none recently - referral to cardiac rehab - " more tired recently    -Continue to work on quitting smoking. Maybe three cigarettes per week    NUTRITION DIAGNOSIS:  Obesity r/t long history of self-monitoring deficit and excessive energy intake aeb BMI >30.- continues    INTERVENTION:  Intervention Provided/Education Provided: Patient reported that she was not following a modified liquid diet recently, she has not used protein shakes in the past month.  She reported that her weight was down lower and her weight has increased in the past month.  She is not exercising and noted she may be starting cardiac rehab.  She continues to smoke up to three cigarettes per week, provided smoking cessation handout.  Gave encouragement and support.  Provided Pt with list of goals and task list.      Note: Pt stated she did not purchase clinic pharmaceutical grade meal replacements due to financial challenges.   10/30/18- patient was interested in review price information with her significant other at home.    Patient Understanding: good  Expected Compliance: good      GOALS:  Relating To Eating:  - Include lean protein at each meal  -Follow the Modified Liquid Diet for weight loss:    Breakfast: Protein Shake (160-250 Jagjit, 20-35 g protein)  Lunch: 3 oz lean protein (chicken/turkey breast or fish)  + non-starchy vegetables (non-starchy veg: green leafy vegetables, cucumbers, broccoli, cauliflower, green string beans)  Supper: 3 oz lean protein (chicken/turkey breast or fish)  + non-starchy vegetables (non-starchy veg: green leafy vegetables, cucumbers, broccoli, cauliflower, green string beans)  Snack: 1 protein bar + non-starchy vegetables (no calorie-containing dips/condiments)  Beverages: at least 48-64 oz water between meals daily (Powerade Zero or Propel Zero or Crystal Light or Isael, less than 5 Calories per serving)    *Protein Shake Criteria: no more than 250 Calories, at least 20 grams of protein, and less than 10 grams of sugar     Frozen Meal Replacements  Healthy  Choice  Lean Cuisine  Atkins Meals  Smart Ones    -Continue eating slowly (20-30 minutes per meal), chewing foods well (25 chews per bite/applesauce consistency)    - Separate beverages from meals, no drinking 30 minutes before, during, and 30 minutes after meals    -Increase exercise as able.  (Pool therapy, walking, etc)    -Continue to work on quitting smoking.     Healthy Recipe Resources:  Dash Diet Recipes [facility]  [email or web address] - the recipe box      Follow-Up: follow-up with RD in 1 month.      Time spent with patient: 15 minutes     Velvet Mcallister RD, LD

## 2019-06-26 ENCOUNTER — ANTICOAGULATION THERAPY VISIT (OUTPATIENT)
Dept: ANTICOAGULATION | Facility: CLINIC | Age: 44
End: 2019-06-26

## 2019-06-26 ENCOUNTER — ANCILLARY PROCEDURE (OUTPATIENT)
Dept: CARDIOLOGY | Facility: CLINIC | Age: 44
End: 2019-06-26
Attending: NURSE PRACTITIONER
Payer: MEDICARE

## 2019-06-26 DIAGNOSIS — I50.22 CHRONIC SYSTOLIC HEART FAILURE (H): ICD-10-CM

## 2019-06-26 DIAGNOSIS — Z79.01 LONG TERM CURRENT USE OF ANTICOAGULANT THERAPY: ICD-10-CM

## 2019-06-26 DIAGNOSIS — I50.32 CHRONIC DIASTOLIC HEART FAILURE (H): ICD-10-CM

## 2019-06-26 DIAGNOSIS — I48.91 ATRIAL FIBRILLATION (H): ICD-10-CM

## 2019-06-26 LAB
ANION GAP SERPL CALCULATED.3IONS-SCNC: 5 MMOL/L (ref 3–14)
BUN SERPL-MCNC: 22 MG/DL (ref 7–30)
CALCIUM SERPL-MCNC: 9.3 MG/DL (ref 8.5–10.1)
CHLORIDE SERPL-SCNC: 101 MMOL/L (ref 94–109)
CO2 SERPL-SCNC: 31 MMOL/L (ref 20–32)
CREAT SERPL-MCNC: 1.18 MG/DL (ref 0.52–1.04)
GFR SERPL CREATININE-BSD FRML MDRD: 56 ML/MIN/{1.73_M2}
GLUCOSE SERPL-MCNC: 162 MG/DL (ref 70–99)
INR PPP: 2.29 (ref 0.86–1.14)
POTASSIUM SERPL-SCNC: 4.1 MMOL/L (ref 3.4–5.3)
SODIUM SERPL-SCNC: 137 MMOL/L (ref 133–144)

## 2019-06-26 PROCEDURE — 80048 BASIC METABOLIC PNL TOTAL CA: CPT | Performed by: INTERNAL MEDICINE

## 2019-06-26 PROCEDURE — 93306 TTE W/DOPPLER COMPLETE: CPT | Performed by: INTERNAL MEDICINE

## 2019-06-26 PROCEDURE — 36415 COLL VENOUS BLD VENIPUNCTURE: CPT | Performed by: INTERNAL MEDICINE

## 2019-06-26 PROCEDURE — 85610 PROTHROMBIN TIME: CPT | Performed by: INTERNAL MEDICINE

## 2019-06-26 RX ADMIN — Medication 5 ML: at 12:30

## 2019-06-26 NOTE — PROGRESS NOTES
ANTICOAGULATION FOLLOW-UP CLINIC VISIT    Patient Name:  Pricilla Brown  Date:  2019  Contact Type:  Telephone    SUBJECTIVE:         OBJECTIVE    INR   Date Value Ref Range Status   2019 2.29 (H) 0.86 - 1.14 Final     Chromogenic Factor 10   Date Value Ref Range Status   2017 19 (L) 70 - 130 % Final     Comment:     Therapeutic Range:  A Chromogenic Factor 10 level of approximately 20-40%   inversely correlates with an INR of 2-3 for patients receiving Warfarin.   Chromogenic Factor 10 levels below 20% indicate an INR greater than 3 and   levels above 40% indicate an INR less than 2.         ASSESSMENT / PLAN  No question data found.  Anticoagulation Summary  As of 2019    INR goal:   2.0-2.5   TTR:   40.0 % (2.9 y)   INR used for dosin.29 (2019)   Warfarin maintenance plan:   20 mg (5 mg x 4) every Fri; 15 mg (5 mg x 3) all other days   Full warfarin instructions:   20 mg every Fri; 15 mg all other days   Weekly warfarin total:   110 mg   No change documented:   Alecia Jacobson RN   Plan last modified:   Alecia Jacobson RN (10/24/2018)   Next INR check:   2019   Priority:   INR   Target end date:   Indefinite    Indications    Atrial fibrillation (H) [I48.91] [I48.91]  Long-term (current) use of anticoagulants [Z79.01] [Z79.01]             Anticoagulation Episode Summary     INR check location:   Clinic Lab    Preferred lab:       Send INR reminders to:   [facility]    Comments:   Contact Patient at [phone]      Anticoagulation Care Providers     Provider Role Specialty Phone number    Percy Alcala MD  Cardiology [phone]            See the Encounter Report to view Anticoagulation Flowsheet and Dosing Calendar (Go to Encounters tab in chart review, and find the Anticoagulation Therapy Visit)    Spoke with patient.     Alecia Jacobson RN

## 2019-06-27 ENCOUNTER — ALLIED HEALTH/NURSE VISIT (OUTPATIENT)
Dept: SURGERY | Facility: CLINIC | Age: 44
End: 2019-06-27
Payer: MEDICARE

## 2019-06-27 ENCOUNTER — OFFICE VISIT (OUTPATIENT)
Dept: CARDIOLOGY | Facility: CLINIC | Age: 44
End: 2019-06-27
Attending: INTERNAL MEDICINE
Payer: MEDICARE

## 2019-06-27 VITALS
HEART RATE: 84 BPM | OXYGEN SATURATION: 95 % | WEIGHT: 293 LBS | HEIGHT: 67 IN | DIASTOLIC BLOOD PRESSURE: 84 MMHG | BODY MASS INDEX: 45.99 KG/M2 | SYSTOLIC BLOOD PRESSURE: 129 MMHG

## 2019-06-27 DIAGNOSIS — Z71.3 DIETARY COUNSELING AND SURVEILLANCE: ICD-10-CM

## 2019-06-27 DIAGNOSIS — E66.01 MORBID OBESITY (H): ICD-10-CM

## 2019-06-27 DIAGNOSIS — E11.42 TYPE 2 DIABETES MELLITUS WITH DIABETIC POLYNEUROPATHY, WITHOUT LONG-TERM CURRENT USE OF INSULIN (H): ICD-10-CM

## 2019-06-27 DIAGNOSIS — I48.0 PAROXYSMAL ATRIAL FIBRILLATION (H): Primary | ICD-10-CM

## 2019-06-27 DIAGNOSIS — I50.23 ACUTE ON CHRONIC SYSTOLIC HEART FAILURE (H): ICD-10-CM

## 2019-06-27 PROCEDURE — 99214 OFFICE O/P EST MOD 30 MIN: CPT | Mod: ZP | Performed by: INTERNAL MEDICINE

## 2019-06-27 PROCEDURE — G0463 HOSPITAL OUTPT CLINIC VISIT: HCPCS | Mod: ZF

## 2019-06-27 PROCEDURE — 93010 ELECTROCARDIOGRAM REPORT: CPT | Mod: ZP | Performed by: INTERNAL MEDICINE

## 2019-06-27 ASSESSMENT — MIFFLIN-ST. JEOR: SCORE: 2936.54

## 2019-06-27 ASSESSMENT — PAIN SCALES - GENERAL: PAINLEVEL: NO PAIN (0)

## 2019-06-27 NOTE — PROGRESS NOTES
Impression:  1. Paroxysmal atrial fibrillation   Warfarin anticoagulation  2. Morbid obesity  3. Diabetes  4. Hypothyroidism  5. Gout  6. HFpEF          The patient returns for follow-up of heart failure.  There is no interim history of chest pain, tightness, paroxysmal nocturnal dyspnea, orthopnea, peripheral edema, but recent palpitationwithout pre-syncope, syncope, device discharge.  Exercise tolerance is stable.  The patient is attempting to exercise regularly and following a sodium restricted, calorically appropriate diet.  Medications are reviewed and the patient is taking medications as prescribed.  The patient is generally sleeping well. INR is therapeutic and now thromboembolic or bleed complications.      She is in atrial fibrillation with controlled response and adequate anticoagulation.      Constitutional: alert, oriented,abnormal gait and station, normal mentation.  Oral: moist mucous membranes  Lymph: without pathologic adenopathy  Chest: clear to ausculation and percussion save for atelectatic basilar rales  Cor: No evidence of left or right ventricular activity.  Rhythm isir regular.  [id number], S2 split physiologically. Murmurs are not present  Abdomen: without tenderness, rebound, guarding, masses, ascites  Extremities: chronic lymphedema without ulceration  Neuro: no focal defects, normal mentation  Skin: without open lesions  Psych: oriented, verbal, mental status in tact    Current Outpatient Medications   Medication     acetaminophen (TYLENOL) 325 MG tablet     albuterol (PROAIR HFA/PROVENTIL HFA/VENTOLIN HFA) 108 (90 Base) MCG/ACT inhaler     bisacodyl (DULCOLAX) 10 MG suppository     blood glucose (ACCU-CHEK RON PLUS) test strip     blood glucose (NO BRAND SPECIFIED) lancets standard     blood glucose (NO BRAND SPECIFIED) test strip     blood glucose monitoring (IBG STAR) meter device kit     blood glucose monitoring (NO BRAND SPECIFIED) meter device kit     buPROPion (WELLBUTRIN SR)  100 MG 12 hr tablet     capsaicin (ZOSTRIX) 0.025 % external cream     ciclopirox (LOPROX) 0.77 % cream     ciclopirox (PENLAC) 8 % external solution     clotrimazole (LOTRIMIN) 1 % external cream     clotrimazole (LOTRIMIN) 1 % external cream     colchicine (COLCYRS) 0.6 MG tablet     diclofenac (VOLTAREN) 1 % topical gel     dulaglutide (TRULICITY) 1.5 MG/0.5ML pen     DULoxetine (CYMBALTA) 20 MG capsule     Efinaconazole 10 % SOLN     empagliflozin (JARDIANCE) 10 MG TABS tablet     gabapentin (NEURONTIN) 300 MG capsule     hydrALAZINE (APRESOLINE) 25 MG tablet     hydrALAZINE (APRESOLINE) 25 MG tablet     insulin glargine U-300 (TOUJEO) 300 UNIT/ML injection     insulin pen needle (BD RIVER U/F) 32G X 4 MM miscellaneous     levothyroxine (SYNTHROID/LEVOTHROID) 200 MCG tablet     metolazone (ZAROXOLYN) 2.5 MG tablet     metoprolol succinate ER (TOPROL-XL) 200 MG 24 hr tablet     nicotine ([employer] NICOTINE) 14 MG/24HR patch 2h hr     nicotine (NICODERM CQ) 14 MG/24HR 24 hr patch     nicotine (NICOTROL) 10 MG inhaler     NOVOLOG FLEXPEN 100 UNIT/ML soln     nystatin (MYCOSTATIN) 052628 UNIT/GM external cream     order for DME     order for DME     order for DME     ORDER FOR DME     oxyCODONE-acetaminophen (PERCOCET) 5-325 MG per tablet     polyethylene glycol (MIRALAX/GLYCOLAX) powder     potassium chloride ER (K-DUR/KLOR-CON M) 20 MEQ CR tablet     Respiratory Therapy Supplies (NEBULIZER COMPRESSOR) KIT     senna (SENOKOT) 8.6 MG tablet     spironolactone (ALDACTONE) 50 MG tablet     tiotropium (SPIRIVA HANDIHALER) 18 MCG inhalation capsule     topiramate (TOPAMAX) 50 MG tablet     torsemide (DEMADEX) 100 MG tablet     warfarin (COUMADIN) 5 MG tablet     warfarin (COUMADIN) 5 MG tablet     warfarin (COUMADIN) 5 MG tablet     morphine (MS CONTIN) 15 MG 12 hr tablet     No current facility-administered medications for this visit.      Facility-Administered Medications Ordered in Other Visits   Medication     sodium  chloride (PF) 0.9% PF flush 10 mL       Wt Readings from Last 24 Encounters:   19 (!) 225.9 kg (498 lb)   19 (!) 227.2 kg (500 lb 14.4 oz)   19 (!) 227.3 kg (501 lb)   19 (!) 229.5 kg (506 lb)   19 (!) 224.6 kg (495 lb 1.6 oz)   18 (!) 220.1 kg (485 lb 4.8 oz)   18 (!) 220 kg (485 lb)   10/30/18 (!) 215.1 kg (474 lb 1.6 oz)   10/25/18 (!) 211.4 kg (466 lb)   10/11/18 (!) 211.5 kg (466 lb 4.8 oz)   10/09/18 (!) 207 kg (456 lb 4.8 oz)   18 (!) 206.8 kg (456 lb)   18 (!) 210.7 kg (464 lb 9.6 oz)   18 (!) 212.4 kg (468 lb 3.2 oz)   18 (!) 210.5 kg (464 lb)   18 (!) 213.7 kg (471 lb 3.2 oz)   18 (!) 206.2 kg (454 lb 8 oz)   18 (!) 211.7 kg (466 lb 12.8 oz)   18 (!) 209.1 kg (460 lb 14.4 oz)   18 (!) 208.7 kg (460 lb)   18 (!) 209.6 kg (462 lb)   18 (!) 204.6 kg (451 lb)   18 (!) 204.6 kg (451 lb 1.6 oz)   18 (!) 208.2 kg (459 lb 1 oz)       Results for BRAXTON SALMERON (MRN [id number]) as of 2019 09:01   Ref. Range 2019 12:18   Sodium Latest Ref Range: 133 - 144 mmol/L 137   Potassium Latest Ref Range: 3.4 - 5.3 mmol/L 4.1   Chloride Latest Ref Range: 94 - 109 mmol/L 101   Carbon Dioxide Latest Ref Range: 20 - 32 mmol/L 31   Urea Nitrogen Latest Ref Range: 7 - 30 mg/dL 22   Creatinine Latest Ref Range: 0.52 - 1.04 mg/dL 1.18 (H)   GFR Estimate Latest Ref Range: >60 mL/min/1.73_m2 56 (L)   GFR Estimate If Black Latest Ref Range: >60 mL/min/1.73_m2 64   Calcium Latest Ref Range: 8.5 - 10.1 mg/dL 9.3   Anion Gap Latest Ref Range: 3 - 14 mmol/L 5   Glucose Latest Ref Range: 70 - 99 mg/dL 162 (H)   INR Latest Ref Range: 0.86 - 1.14  2.29 (H)     Name: BRAXTON SALMERON  MRN: [id number]  : 1975  Study Date: 2019 11:41 AM  Age: 44 yrs  Gender: Female  Patient Location: [facility]  Reason For Study: Chronic diastolic heart failure (H)  Ordering Physician: CLIFFORD BARDALES  Referring Physician:  "CLIFFORD BARDALES  Performed By: Shakila Cummings RDCS     BSA: 2.9 m2  Height: 67 in  Weight: 470 lb  BP: 149/87 mmHg  _____________________________________________________________________________  __        Procedure  Echocardiogram with two-dimensional, color and spectral Doppler performed.  Contrast Optison. Technically difficult study.Extremely poor acoustic windows.  Limited information was obtained during study. Optison (NDC #6873-6196-58)  given intravenously. Patient was given 5 ml mixture of 3 ml Optison and 6 ml  saline. 4 ml wasted. IV start location R Upper arm .  _____________________________________________________________________________  __        Interpretation Summary     Technically difficult study.Extremely poor acoustic windows.  Limited information was obtained during study.  Left ventricular size is normal.  LVEF 43% based on biplane 2D tracing.  The inferior vena cava is normal    Plan:  1. [facility] 2 weeks for INR and rhythm assessment  2. We discussed profound need for bariatric surgery as well as need to lose weight in anticipation of same and for improvement in atrial fibrillation  3. She should consider cardiac rehabiliation, \"chronic congestive heart failure, diastololic\"  4. Candid discussion with dietician regarding eating healthily within budget    Percy Alcala    "

## 2019-06-27 NOTE — PATIENT INSTRUCTIONS
Thank you for your visit today.  Please call me with any questions or concerns.   Alexandru Wang RN  Cardiology Care Coordinator  [phone], press option [phone] then option 4

## 2019-06-27 NOTE — LETTER
6/27/2019      RE: Pricilla Brown  [address]  [facility] [address]       Dear Colleague,    Thank you for the opportunity to participate in the care of your patient, Pricilla Brown, at the [facility]. Please see a copy of my visit note below.      Impression:  1. Paroxysmal atrial fibrillation   Warfarin anticoagulation  2. Morbid obesity  3. Diabetes  4. Hypothyroidism  5. Gout  6. HFpEF    The patient returns for follow-up of heart failure.  There is no interim history of chest pain, tightness, paroxysmal nocturnal dyspnea, orthopnea, peripheral edema, but recent palpitationwithout pre-syncope, syncope, device discharge.  Exercise tolerance is stable.  The patient is attempting to exercise regularly and following a sodium restricted, calorically appropriate diet.  Medications are reviewed and the patient is taking medications as prescribed.  The patient is generally sleeping well. INR is therapeutic and now thromboembolic or bleed complications.      She is in atrial fibrillation with controlled response and adequate anticoagulation.      Constitutional: alert, oriented,abnormal gait and station, normal mentation.  Oral: moist mucous membranes  Lymph: without pathologic adenopathy  Chest: clear to ausculation and percussion save for atelectatic basilar rales  Cor: No evidence of left or right ventricular activity.  Rhythm isir regular.  [id number], S2 split physiologically. Murmurs are not present  Abdomen: without tenderness, rebound, guarding, masses, ascites  Extremities: chronic lymphedema without ulceration  Neuro: no focal defects, normal mentation  Skin: without open lesions  Psych: oriented, verbal, mental status in tact    Current Outpatient Medications   Medication     acetaminophen (TYLENOL) 325 MG tablet     albuterol (PROAIR HFA/PROVENTIL HFA/VENTOLIN HFA) 108 (90 Base) MCG/ACT inhaler     bisacodyl (DULCOLAX) 10 MG suppository      blood glucose (ACCU-CHEK RON PLUS) test strip     blood glucose (NO BRAND SPECIFIED) lancets standard     blood glucose (NO BRAND SPECIFIED) test strip     blood glucose monitoring (IBG STAR) meter device kit     blood glucose monitoring (NO BRAND SPECIFIED) meter device kit     buPROPion (WELLBUTRIN SR) 100 MG 12 hr tablet     capsaicin (ZOSTRIX) 0.025 % external cream     ciclopirox (LOPROX) 0.77 % cream     ciclopirox (PENLAC) 8 % external solution     clotrimazole (LOTRIMIN) 1 % external cream     clotrimazole (LOTRIMIN) 1 % external cream     colchicine (COLCYRS) 0.6 MG tablet     diclofenac (VOLTAREN) 1 % topical gel     dulaglutide (TRULICITY) 1.5 MG/0.5ML pen     DULoxetine (CYMBALTA) 20 MG capsule     Efinaconazole 10 % SOLN     empagliflozin (JARDIANCE) 10 MG TABS tablet     gabapentin (NEURONTIN) 300 MG capsule     hydrALAZINE (APRESOLINE) 25 MG tablet     hydrALAZINE (APRESOLINE) 25 MG tablet     insulin glargine U-300 (TOUJEO) 300 UNIT/ML injection     insulin pen needle (BD RIVER U/F) 32G X 4 MM miscellaneous     levothyroxine (SYNTHROID/LEVOTHROID) 200 MCG tablet     metolazone (ZAROXOLYN) 2.5 MG tablet     metoprolol succinate ER (TOPROL-XL) 200 MG 24 hr tablet     nicotine ([employer] NICOTINE) 14 MG/24HR patch 2h hr     nicotine (NICODERM CQ) 14 MG/24HR 24 hr patch     nicotine (NICOTROL) 10 MG inhaler     NOVOLOG FLEXPEN 100 UNIT/ML soln     nystatin (MYCOSTATIN) 590875 UNIT/GM external cream     order for DME     order for DME     order for DME     ORDER FOR DME     oxyCODONE-acetaminophen (PERCOCET) 5-325 MG per tablet     polyethylene glycol (MIRALAX/GLYCOLAX) powder     potassium chloride ER (K-DUR/KLOR-CON M) 20 MEQ CR tablet     Respiratory Therapy Supplies (NEBULIZER COMPRESSOR) KIT     senna (SENOKOT) 8.6 MG tablet     spironolactone (ALDACTONE) 50 MG tablet     tiotropium (SPIRIVA HANDIHALER) 18 MCG inhalation capsule     topiramate (TOPAMAX) 50 MG tablet     torsemide (DEMADEX) 100 MG tablet      warfarin (COUMADIN) 5 MG tablet     warfarin (COUMADIN) 5 MG tablet     warfarin (COUMADIN) 5 MG tablet     morphine (MS CONTIN) 15 MG 12 hr tablet     No current facility-administered medications for this visit.      Facility-Administered Medications Ordered in Other Visits   Medication     sodium chloride (PF) 0.9% PF flush 10 mL       Wt Readings from Last 24 Encounters:   06/27/19 (!) 225.9 kg (498 lb)   03/13/19 (!) 227.2 kg (500 lb 14.4 oz)   03/13/19 (!) 227.3 kg (501 lb)   02/19/19 (!) 229.5 kg (506 lb)   01/07/19 (!) 224.6 kg (495 lb 1.6 oz)   11/30/18 (!) 220.1 kg (485 lb 4.8 oz)   11/29/18 (!) 220 kg (485 lb)   10/30/18 (!) 215.1 kg (474 lb 1.6 oz)   10/25/18 (!) 211.4 kg (466 lb)   10/11/18 (!) 211.5 kg (466 lb 4.8 oz)   10/09/18 (!) 207 kg (456 lb 4.8 oz)   09/20/18 (!) 206.8 kg (456 lb)   09/11/18 (!) 210.7 kg (464 lb 9.6 oz)   07/25/18 (!) 212.4 kg (468 lb 3.2 oz)   07/19/18 (!) 210.5 kg (464 lb)   06/19/18 (!) 213.7 kg (471 lb 3.2 oz)   06/13/18 (!) 206.2 kg (454 lb 8 oz)   05/29/18 (!) 211.7 kg (466 lb 12.8 oz)   03/29/18 (!) 209.1 kg (460 lb 14.4 oz)   03/20/18 (!) 208.7 kg (460 lb)   02/28/18 (!) 209.6 kg (462 lb)   02/07/18 (!) 204.6 kg (451 lb)   02/07/18 (!) 204.6 kg (451 lb 1.6 oz)   01/22/18 (!) 208.2 kg (459 lb 1 oz)       Results for BRAXTON SALMERON (MRN [id number]) as of 6/27/2019 09:01   Ref. Range 6/26/2019 12:18   Sodium Latest Ref Range: 133 - 144 mmol/L 137   Potassium Latest Ref Range: 3.4 - 5.3 mmol/L 4.1   Chloride Latest Ref Range: 94 - 109 mmol/L 101   Carbon Dioxide Latest Ref Range: 20 - 32 mmol/L 31   Urea Nitrogen Latest Ref Range: 7 - 30 mg/dL 22   Creatinine Latest Ref Range: 0.52 - 1.04 mg/dL 1.18 (H)   GFR Estimate Latest Ref Range: >60 mL/min/1.73_m2 56 (L)   GFR Estimate If Black Latest Ref Range: >60 mL/min/1.73_m2 64   Calcium Latest Ref Range: 8.5 - 10.1 mg/dL 9.3   Anion Gap Latest Ref Range: 3 - 14 mmol/L 5   Glucose Latest Ref Range: 70 - 99 mg/dL 162 (H)  "  INR Latest Ref Range: 0.86 - 1.14  2.29 (H)     Name: BRAXTON SALMERON  MRN: [id number]  : 1975  Study Date: 2019 11:41 AM  Age: 44 yrs  Gender: Female  Patient Location: [facility]  Reason For Study: Chronic diastolic heart failure (H)  Ordering Physician: CLIFFORD BARDALES  Referring Physician: CLIFFORD BARDALES  Performed By: Shakila Cummings RDCS     BSA: 2.9 m2  Height: 67 in  Weight: 470 lb  BP: 149/87 mmHg  _____________________________________________________________________________  __        Procedure  Echocardiogram with two-dimensional, color and spectral Doppler performed.  Contrast Optison. Technically difficult study.Extremely poor acoustic windows.  Limited information was obtained during study. Optison (NDC #7305-1234-08)  given intravenously. Patient was given 5 ml mixture of 3 ml Optison and 6 ml  saline. 4 ml wasted. IV start location R Upper arm .  _____________________________________________________________________________  __        Interpretation Summary     Technically difficult study.Extremely poor acoustic windows.  Limited information was obtained during study.  Left ventricular size is normal.  LVEF 43% based on biplane 2D tracing.  The inferior vena cava is normal    Plan:  1. [facility] 2 weeks for INR and rhythm assessment  2. We discussed profound need for bariatric surgery as well as need to lose weight in anticipation of same and for improvement in atrial fibrillation  3. She should consider cardiac rehabiliation, \"chronic congestive heart failure, diastololic\"  4. Candid discussion with dietician regarding eating healthily within budget    Percy Alcala    "

## 2019-06-27 NOTE — PATIENT INSTRUCTIONS
GOALS:  Relating To Eating:  - Include lean protein at each meal  -Follow the Modified Liquid Diet for weight loss:    Breakfast: Protein Shake (160-250 Jagjit, 20-35 g protein)  Lunch: 3 oz lean protein (chicken/turkey breast or fish)  + non-starchy vegetables (non-starchy veg: green leafy vegetables, cucumbers, broccoli, cauliflower, green string beans)  Supper: 3 oz lean protein (chicken/turkey breast or fish)  + non-starchy vegetables (non-starchy veg: green leafy vegetables, cucumbers, broccoli, cauliflower, green string beans)  Snack: 1 protein bar + non-starchy vegetables (no calorie-containing dips/condiments)  Beverages: at least 48-64 oz water between meals daily (Powerade Zero or Propel Zero or Crystal Light or [place], less than 5 Calories per serving)    *Protein Shake Criteria: no more than 250 Calories, at least 20 grams of protein, and less than 10 grams of sugar     Frozen Meal Replacements  Healthy Choice  Lean Cuisine  Atkins Meals  Smart Ones    -Continue eating slowly (20-30 minutes per meal), chewing foods well (25 chews per bite/applesauce consistency)    - Separate beverages from meals, no drinking 30 minutes before, during, and 30 minutes after meals    -Increase exercise as able.  (Pool therapy, walking, etc)    -Continue to work on quitting smoking.     Healthy Recipe Resources:  Dash Diet Recipes [facility]  [email or web address] - the recipe box    Follow up with RD in one month    Velvet Mcallister RD, LD  If you need to schedule or reschedule with a dietitian please call [phone].

## 2019-06-28 LAB — INTERPRETATION ECG - MUSE: NORMAL

## 2019-07-02 DIAGNOSIS — I50.23 ACUTE ON CHRONIC SYSTOLIC HEART FAILURE (H): Primary | ICD-10-CM

## 2019-07-05 DIAGNOSIS — I48.20 CHRONIC ATRIAL FIBRILLATION (H): Primary | ICD-10-CM

## 2019-07-05 RX ORDER — WARFARIN SODIUM 5 MG/1
TABLET ORAL
Qty: 280 TABLET | Refills: 1 | Status: SHIPPED | OUTPATIENT
Start: 2019-07-05 | End: 2020-04-09 | Stop reason: SINTOL

## 2019-07-10 DIAGNOSIS — I50.32 CHRONIC DIASTOLIC HEART FAILURE (H): Primary | ICD-10-CM

## 2019-07-10 DIAGNOSIS — I48.20 CHRONIC ATRIAL FIBRILLATION (H): ICD-10-CM

## 2019-07-23 ENCOUNTER — OFFICE VISIT (OUTPATIENT)
Dept: CARDIOLOGY | Facility: CLINIC | Age: 44
End: 2019-07-23
Attending: NURSE PRACTITIONER
Payer: MEDICARE

## 2019-07-23 ENCOUNTER — ANTICOAGULATION THERAPY VISIT (OUTPATIENT)
Dept: ANTICOAGULATION | Facility: CLINIC | Age: 44
End: 2019-07-23

## 2019-07-23 VITALS
BODY MASS INDEX: 45.99 KG/M2 | OXYGEN SATURATION: 97 % | SYSTOLIC BLOOD PRESSURE: 130 MMHG | DIASTOLIC BLOOD PRESSURE: 83 MMHG | WEIGHT: 293 LBS | HEART RATE: 76 BPM | HEIGHT: 67 IN

## 2019-07-23 DIAGNOSIS — I50.33 ACUTE ON CHRONIC HEART FAILURE WITH PRESERVED EJECTION FRACTION (H): Primary | ICD-10-CM

## 2019-07-23 DIAGNOSIS — I48.0 PAF (PAROXYSMAL ATRIAL FIBRILLATION) (H): ICD-10-CM

## 2019-07-23 DIAGNOSIS — Z79.01 LONG TERM CURRENT USE OF ANTICOAGULANT THERAPY: ICD-10-CM

## 2019-07-23 DIAGNOSIS — I48.0 PAROXYSMAL ATRIAL FIBRILLATION (H): ICD-10-CM

## 2019-07-23 DIAGNOSIS — I50.32 CHRONIC DIASTOLIC HEART FAILURE (H): ICD-10-CM

## 2019-07-23 DIAGNOSIS — I50.23 ACUTE ON CHRONIC SYSTOLIC HEART FAILURE (H): ICD-10-CM

## 2019-07-23 DIAGNOSIS — I48.91 ATRIAL FIBRILLATION, UNSPECIFIED TYPE (H): ICD-10-CM

## 2019-07-23 DIAGNOSIS — E66.01 MORBID OBESITY (H): ICD-10-CM

## 2019-07-23 DIAGNOSIS — G47.33 OSA (OBSTRUCTIVE SLEEP APNEA): ICD-10-CM

## 2019-07-23 LAB
ANION GAP SERPL CALCULATED.3IONS-SCNC: 7 MMOL/L (ref 3–14)
BUN SERPL-MCNC: 22 MG/DL (ref 7–30)
CALCIUM SERPL-MCNC: 8.5 MG/DL (ref 8.5–10.1)
CHLORIDE SERPL-SCNC: 104 MMOL/L (ref 94–109)
CO2 SERPL-SCNC: 22 MMOL/L (ref 20–32)
CREAT SERPL-MCNC: 0.97 MG/DL (ref 0.52–1.04)
GFR SERPL CREATININE-BSD FRML MDRD: 71 ML/MIN/{1.73_M2}
GLUCOSE SERPL-MCNC: 208 MG/DL (ref 70–99)
INR PPP: 3.49 (ref 0.86–1.14)
NT-PROBNP SERPL-MCNC: 268 PG/ML (ref 0–125)
POTASSIUM SERPL-SCNC: 4.1 MMOL/L (ref 3.4–5.3)
SODIUM SERPL-SCNC: 134 MMOL/L (ref 133–144)

## 2019-07-23 PROCEDURE — 85610 PROTHROMBIN TIME: CPT | Performed by: NURSE PRACTITIONER

## 2019-07-23 PROCEDURE — G0463 HOSPITAL OUTPT CLINIC VISIT: HCPCS

## 2019-07-23 PROCEDURE — 25000132 ZZH RX MED GY IP 250 OP 250 PS 637: Mod: GY | Performed by: NURSE PRACTITIONER

## 2019-07-23 PROCEDURE — 93010 ELECTROCARDIOGRAM REPORT: CPT | Mod: ZP | Performed by: INTERNAL MEDICINE

## 2019-07-23 PROCEDURE — 36415 COLL VENOUS BLD VENIPUNCTURE: CPT | Performed by: NURSE PRACTITIONER

## 2019-07-23 PROCEDURE — 80048 BASIC METABOLIC PNL TOTAL CA: CPT | Performed by: NURSE PRACTITIONER

## 2019-07-23 PROCEDURE — 99215 OFFICE O/P EST HI 40 MIN: CPT | Mod: ZP | Performed by: NURSE PRACTITIONER

## 2019-07-23 PROCEDURE — 83880 ASSAY OF NATRIURETIC PEPTIDE: CPT | Performed by: NURSE PRACTITIONER

## 2019-07-23 PROCEDURE — 93005 ELECTROCARDIOGRAM TRACING: CPT | Mod: ZF

## 2019-07-23 RX ORDER — METOLAZONE 2.5 MG/1
5 TABLET ORAL ONCE
Status: COMPLETED | OUTPATIENT
Start: 2019-07-23 | End: 2019-07-23

## 2019-07-23 RX ORDER — BUMETANIDE 1 MG/1
5 TABLET ORAL 2 TIMES DAILY
Qty: 300 TABLET | Refills: 11 | Status: SHIPPED | OUTPATIENT
Start: 2019-07-23 | End: 2021-07-16

## 2019-07-23 RX ORDER — POTASSIUM CHLORIDE 1500 MG/1
40 TABLET, EXTENDED RELEASE ORAL ONCE
Status: COMPLETED | OUTPATIENT
Start: 2019-07-23 | End: 2019-07-23

## 2019-07-23 RX ADMIN — POTASSIUM CHLORIDE 40 MEQ: 1500 TABLET, EXTENDED RELEASE ORAL at 15:53

## 2019-07-23 RX ADMIN — METOLAZONE 5 MG: 5 TABLET ORAL at 15:52

## 2019-07-23 ASSESSMENT — PAIN SCALES - GENERAL: PAINLEVEL: NO PAIN (0)

## 2019-07-23 ASSESSMENT — MIFFLIN-ST. JEOR: SCORE: 2972.83

## 2019-07-23 NOTE — PROGRESS NOTES
ANTICOAGULATION FOLLOW-UP CLINIC VISIT    Patient Name:  Pricilla Brown  Date:  7/23/2019  Contact Type:  Telephone    SUBJECTIVE:         OBJECTIVE    INR   Date Value Ref Range Status   07/23/2019 3.49 (H) 0.86 - 1.14 Final     Chromogenic Factor 10   Date Value Ref Range Status   04/08/2017 19 (L) 70 - 130 % Final     Comment:     Therapeutic Range:  A Chromogenic Factor 10 level of approximately 20-40%   inversely correlates with an INR of 2-3 for patients receiving Warfarin.   Chromogenic Factor 10 levels below 20% indicate an INR greater than 3 and   levels above 40% indicate an INR less than 2.         ASSESSMENT / PLAN  INR assessment SUPRA    Recheck INR In: 1 WEEK    INR Location Clinic      Anticoagulation Summary  As of 7/23/2019    INR goal:   2.0-2.5   TTR:   39.4 % (3 y)   INR used for dosing:   3.49! (7/23/2019)   Warfarin maintenance plan:   20 mg (5 mg x 4) every Fri; 15 mg (5 mg x 3) all other days   Full warfarin instructions:   7/23: 5 mg; Otherwise 20 mg every Fri; 15 mg all other days   Weekly warfarin total:   110 mg   Plan last modified:   Alecia Jacobson RN (10/24/2018)   Next INR check:   7/30/2019   Priority:   INR   Target end date:   Indefinite    Indications    Atrial fibrillation (H) [I48.91] [I48.91]  Long-term (current) use of anticoagulants [Z79.01] [Z79.01]             Anticoagulation Episode Summary     INR check location:   Clinic Lab    Preferred lab:       Send INR reminders to:   [facility]    Comments:   Contact Patient at [phone]      Anticoagulation Care Providers     Provider Role Specialty Phone number    Percy Alcala MD  Cardiology [phone]            See the Encounter Report to view Anticoagulation Flowsheet and Dosing Calendar (Go to Encounters tab in chart review, and find the Anticoagulation Therapy Visit)  Left message for patient with results and dosing recommendations. Asked patient to call back to report any missed doses, falls,  signs and symptoms of bleeding or clotting, any changes in health, medication, or diet. Asked patient to call back with any questions or concerns.      Dorene Edge RN

## 2019-07-23 NOTE — NURSING NOTE
Vitals completed successfully and medication reconciled.     Iesha Branch, KENYA  2:46 PM  Chief Complaint   Patient presents with     Follow Up     2 week core

## 2019-07-23 NOTE — NURSING NOTE
Diet: Patient instructed regarding a heart failure healthy diet, including discussion of reduced fat and 2000 mg daily sodium restriction, daily weights, medication purpose and compliance, fluid restrictions and resources for patient and family to access for assistance with heart failure management.       Labs: Patient was given results of the laboratory testing obtained today and patient was instructed about when to return for the next laboratory testing.     Med Reconcile: Reviewed and verified all current medications with the patient. The updated medication list was printed and given to the patient.     Med changes: stop toresemide, start Bumex 5 mg BID    Return Appointment: Patient given instructions regarding scheduling next clinic visit: follow up call on Friday, [facility] in 1 week    Patient stated she understood all health information given and agreed to call with further questions or concerns.     Shanice Ambrocio RN

## 2019-07-23 NOTE — NURSING NOTE
Vitals were taken, medications reconciled and EKG performed.    Jaskaran Mendoza CMA    3:08 PM

## 2019-07-23 NOTE — LETTER
7/23/2019      RE: Pricilla Brown  [address]  [facility] [address]       Dear Colleague,    Thank you for the opportunity to participate in the care of your patient, Pricilla Brown, at the [facility] at [facility]. Please see a copy of my visit note below.    HPI: Pricilla Brown is a 44 year old with a past medical history of morbid obesity, afib, DM II, HTN, NICM with an EF of 43%, and JYOTI who returns for follow up.      Pricilla has been feeling more short of breath.  She develops SOB after walking several feet and has to stop to catch her breath. The last few days she had to sleep in the recliner due to orthopnea and PND.  She isn't urinating as much and doesn't feel that the Torsemide is working as effectively. She has not been weighing herself as she doesn't have a battery for her scale.  She has no other symptoms.      She has been taking her medications as prescribed and has been taking Metolazone twice weekly.  She has not been following her fluid restrictions and has been drinking more than 3 liters daily.      PAST MEDICAL HISTORY:  Past Medical History:   Diagnosis Date     A-fib (H) 2011    on coumadin since 1/13     Asthma     as a kid     Chest pain 2/1/2017     Chronic anticoagulation for a-fib 2/15/2013    INR's followed by coumadin clinic at      Diabetes mellitus (H) 2012     Diastolic heart failure 2/15/2013     Dilated cardiomyopathy (H) 1/8/2013     HTN (hypertension)      Hyperthyroidism     Graves, s/p I131 1/13, now on prednisone and methimazole     Morbid obesity (H)      Pulmonary embolism (H) 1/12    hospitalized in [place], on lovenox/coumadin for a few months but stopped, hypercoag w/u neg per pt     Sleep apnea     using CPAP       FAMILY HISTORY:  Family History   Problem Relation Age of Onset     Thyroid Disease Mother         Grave's D     Diabetes Mother      Heart Disease Mother      Cerebrovascular Disease Mother         dec. 54 yo      Hypertension Mother      Heart Disease Sister         Heart Murmur     Diabetes Sister      Heart Disease Father         dec in his 30s, MI     Psychotic Disorder Brother         Bipolar     Diabetes Brother      Thyroid Disease Brother         Hyper Thyroid     Heart Disease Brother      Thyroid Disease Sister         Hyper Thyroid     Thyroid Disease Sister         Hyper Thyroid     Thyroid Disease Sister         Mental Health Problems     Thyroid Disease Maternal Aunt      Thyroid Disease Maternal Uncle      Cancer No family hx of      Glaucoma No family hx of      Macular Degeneration No family hx of        SOCIAL HISTORY:  Social History     Socioeconomic History     Marital status: Single     Spouse name: None     Number of children: None     Years of education: None     Highest education level: None   Occupational History     None   Social Needs     Financial resource strain: None     Food insecurity:     Worry: None     Inability: None     Transportation needs:     Medical: None     Non-medical: None   Tobacco Use     Smoking status: Light Tobacco Smoker     Packs/day: 0.25     Years: 13.00     Pack years: 3.25     Types: Cigarettes     Smokeless tobacco: Never Used     Tobacco comment: down to 5 cigs   Substance and Sexual Activity     Alcohol use: No     Drug use: No     Sexual activity: Yes     Partners: Male     Birth control/protection: Condom   Lifestyle     Physical activity:     Days per week: None     Minutes per session: None     Stress: None   Relationships     Social connections:     Talks on phone: None     Gets together: None     Attends [employer] service: None     Active member of club or organization: None     Attends meetings of clubs or organizations: None     Relationship status: None     Intimate partner violence:     Fear of current or ex partner: None     Emotionally abused: None     Physically abused: None     Forced sexual activity: None   Other Topics Concern     Parent/sibling  w/ CABG, MI or angioplasty before 65F 55M? Not Asked   Social History Narrative    Single, no children        Gyn:        Last pap several years ago, no abnormal    No STIs            Patient is single.  She is no longer working.  She used to work in the area of customer service.  She is currently living with her sister in [place], [place].  She has no pets.  Patient has smoked a half pack of cigarettes a day for the past 10 plus years.  She states that she is down to 5 cigarettes a day with the aid of Wellbutrin.  She does not smoke cigars, pipes or chew tobacco.  She has 1 cup of coffee in the morning.  She does not drink alcohol.  Patient does not exercise.        CURRENT MEDICATIONS:  Current Outpatient Medications   Medication Sig Dispense Refill     acetaminophen (TYLENOL) 325 MG tablet Take 2 tablets (650 mg) by mouth 3 times daily as needed for mild pain or fever (total acetaminophen dose should not exceed 3000 mg per day) 180 tablet 5     albuterol (PROAIR HFA/PROVENTIL HFA/VENTOLIN HFA) 108 (90 Base) MCG/ACT inhaler Inhale 2 puffs into the lungs every 6 hours as needed for shortness of breath / dyspnea 75 g 0     bisacodyl (DULCOLAX) 10 MG suppository Place 1 suppository (10 mg) rectally daily as needed for constipation 6 suppository 0     blood glucose (ACCU-CHEK RON PLUS) test strip Use to test blood sugar 4  times daily or as directed. 360 each 3     blood glucose (NO BRAND SPECIFIED) lancets standard USE TO CHECK  blood sugar fasting each am  prelunch and predinner daily OR AS DIRECTED Call clinic to schedule MD APPT. 400 each 3     blood glucose (NO BRAND SPECIFIED) test strip Use to test blood sugars 3 times daily or as directed 400 strip 3     blood glucose monitoring (IBG STAR) meter device kit -PLEASE GIVE PATIENT A DEVICE HER INSURANCE WILL COVER- 1 kit 0     blood glucose monitoring (NO BRAND SPECIFIED) meter device kit Use to test blood sugar 3 times daily or as directed. 1 kit 1      buPROPion (WELLBUTRIN SR) 100 MG 12 hr tablet Take 1 tablet (100 mg) by mouth 2 times daily 180 tablet 2     capsaicin (ZOSTRIX) 0.025 % external cream Apply 1 g topically 3 times daily as needed 1 Tube 0     ciclopirox (LOPROX) 0.77 % cream Apply topically 2 times daily To feet and toenails. 90 g 6     ciclopirox (PENLAC) 8 % external solution Apply topically daily To toenails. 6.6 mL 0     clotrimazole (LOTRIMIN) 1 % external cream Apply topically 2 times daily as needed (skin irritation) 60 g 1     colchicine (COLCYRS) 0.6 MG tablet Take 1-2 tablets (0.6-1.2 mg) by mouth daily as needed for moderate pain 180 tablet 0     diclofenac (VOLTAREN) 1 % topical gel Apply 4 grams to knees or 2 grams to hands four times daily using enclosed dosing card. 100 g 1     dulaglutide (TRULICITY) 1.5 MG/0.5ML pen Inject 1.5 mg Subcutaneous every 7 days 6 mL 0     DULoxetine (CYMBALTA) 20 MG capsule Take 1 capsule (20 mg) by mouth 2 times daily 60 capsule 0     Efinaconazole 10 % SOLN Externally apply topically daily To toenails. 8 mL 11     empagliflozin (JARDIANCE) 10 MG TABS tablet Take 1 tablet (10 mg) by mouth daily 90 tablet 3     gabapentin (NEURONTIN) 300 MG capsule TAKE TWO CAPSULES (600 MG) BY MOUTH TWICE A  capsule 0     hydrALAZINE (APRESOLINE) 25 MG tablet Take 50-75 mg by mouth Take 2 tabs (50 mg) in am, 2 tabs (50 mg) midday, and 3 tabs (75 mg) in pm daily  450 tablet 3     insulin glargine U-300 (TOUJEO) 300 UNIT/ML injection Inject 170 units SQ each am. 170 mL 1     insulin pen needle (BD RIVER U/F) 32G X 4 MM miscellaneous Use 6 daily or as directed 600 each 3     levothyroxine (SYNTHROID/LEVOTHROID) 200 MCG tablet TAKE ONE TABLET BY MOUTH EVERY DAY MONDAY THROUGH SATURDAY AND TAKE TWO TABLETS ON SUNDAYS 102 tablet 1     metolazone (ZAROXOLYN) 2.5 MG tablet Take 1 tablet (2.5 mg) by mouth 2 times daily Take 1/2 hour prior to torsemide 180 tablet 3     metoprolol succinate ER (TOPROL-XL) 200 MG 24 hr  tablet Take 1 tablet (200 mg) by mouth daily 90 tablet 3     nicotine ([employer] NICOTINE) 14 MG/24HR patch 2h hr Place 1 patch onto the skin every 24 hours 30 patch 1     NOVOLOG FLEXPEN 100 UNIT/ML soln Inject 70 units with meals plus correction. Pt uses approx 200 units in 24 hrs. 180 mL 3     nystatin (MYCOSTATIN) 088894 UNIT/GM external cream Apply topically 2 times daily To toenails 90 g 6     order for DME Left foot 1 Units 0     order for DME Equipment being ordered:  4386-4216 $92   Plantar, fasciitis, LG, night splint 1 each 0     order for DME Equipment being ordered: Challenger Wide walker if available - patient needs seat, basket and brakes. 1 each 0     ORDER FOR DME Use your CPAP device as directed by your provider. Pressure change to min 13 max 18cwp 1 each 99     oxyCODONE-acetaminophen (PERCOCET) 5-325 MG per tablet Take 1 tablet by mouth every 8 hours as needed for moderate to severe pain (try to limit use, no further prescriptions until seen in pain clinic) 60 tablet 0     polyethylene glycol (MIRALAX/GLYCOLAX) powder        potassium chloride ER (K-DUR/KLOR-CON M) 20 MEQ CR tablet Take 2 tablets (40 mEq) by mouth 4 times daily 240 tablet 11     Respiratory Therapy Supplies (NEBULIZER COMPRESSOR) KIT 1 Device 4 times daily as needed. 1 kit 3     senna (SENOKOT) 8.6 MG tablet Take 1 tablet by mouth 2 times daily 45 tablet 0     spironolactone (ALDACTONE) 50 MG tablet Take 1 tablet (50 mg) by mouth daily 90 tablet 1     tiotropium (SPIRIVA HANDIHALER) 18 MCG inhalation capsule Inhale contents of one capsule daily. 30 capsule 1     topiramate (TOPAMAX) 50 MG tablet Take 2 tablets (100 mg) by mouth daily Take 100 mg for 1 month and increase to 150 mg thereafter 270 tablet 3     torsemide (DEMADEX) 100 MG tablet Take 1 tablet (100 mg) by mouth 2 times daily 180 tablet 1     warfarin (COUMADIN) 5 MG tablet Take 20mg on F and 15mg on MTuWThSatSun, or as directed by the [facility] at  "[facility] 280 tablet 1     warfarin (COUMADIN) 5 MG tablet Take 20mgs on Fridays and 15mgs on all other days or as directed by the Coumadin Clinic 100 tablet 3     warfarin (COUMADIN) 5 MG tablet TAKE 3 TABLETS BY MOUTH ON MON, TUES, THURS, SAT, AND SUN AND 4 TABS ON WED AND FRI. 170 tablet 1     warfarin (COUMADIN) 5 MG tablet [id number] on MTuThSatSun, and 20mg on WF, or as directed by the Med[facility] at the [facility] 170 tablet 1     morphine (MS CONTIN) 15 MG 12 hr tablet Take 15 mg by mouth daily as needed         ROS:  Constitutional: Denies fever, chills, or diaphoresis. See HPI   Respiratory:  See HPI.     Cardiovascular: As per HPI.   GI: Denies nausea, vomiting, diarrhea, hematemesis, melena, or hematochezia.   : See HPI  Integument: Negative for bruising, rash, or pruritis.  Psychiatric: Negative for anxiety, depression, sleep disturbance, or mood changes.   Neuro: Negative for headaches, syncope, numbness or tingling.   Endocrinology: +DM and excess thirst  Musculoskeletal: Negative for gout, joint swelling, or muscle weakness    EXAM:  /83 (BP Location: Left arm, Patient Position: Chair, Cuff Size: Adult Regular)   Pulse 76   Ht 1.702 m (5' 7\")   Wt (!) 229.5 kg (506 lb)   SpO2 97%   BMI 79.25 kg/m     General: alert, articulate, and in no acute distress.  HEENT: normocephalic, atraumatic, anicteric sclera, EOMI, mucosa moist, no cyanosis.    Neck: Supple.  JVP difficult to discern due to neck girth.   Heart: regular rhythm, normal S1/S2, no murmur, gallop, rub.  Precordium quiet with normal PMI.     Lungs: Clear, no rales, ronchi, or wheezing.  No accessory muscle use, respirations unlabored.   Abdomen: soft, non-tender, bowel sounds present, no hepatomegaly  Extremities: 2+ pitting edema.  No cyanosis.  Extremities warm to touch.  Neurological: Alert and oriented x 3.  Normal speech and affect, no gross motor deficits  Skin:  No ecchymoses, rashes, or clubbing.   "     Labs:  CBC RESULTS:  Lab Results   Component Value Date    WBC 5.0 03/18/2019    RBC 4.67 03/18/2019    HGB 13.5 03/18/2019    HCT 44.0 03/18/2019    MCV 94 03/18/2019    MCH 28.9 03/18/2019    MCHC 30.7 (L) 03/18/2019    RDW 14.0 03/18/2019     03/18/2019       CMP RESULTS:  Lab Results   Component Value Date     06/26/2019    POTASSIUM 4.1 06/26/2019    CHLORIDE 101 06/26/2019    CO2 31 06/26/2019    ANIONGAP 5 06/26/2019     (H) 06/26/2019    BUN 22 06/26/2019    CR 1.18 (H) 06/26/2019    GFRESTIMATED 56 (L) 06/26/2019    GFRESTBLACK 64 06/26/2019    JUSTIN 9.3 06/26/2019    BILITOTAL 0.2 10/25/2018    ALBUMIN 2.8 (L) 10/25/2018    ALKPHOS 89 10/25/2018    ALT 19 10/25/2018    AST 9 10/25/2018        INR RESULTS:  Lab Results   Component Value Date    INR 3.49 (H) 07/23/2019       No components found for: CK  Lab Results   Component Value Date    MAG 1.8 01/06/2015     Lab Results   Component Value Date    NTBNP 37 12/12/2017       Most recent echocardiogram 6/26/19:  Interpretation Summary  Technically difficult study.Extremely poor acoustic windows.  Limited information was obtained during study.  Left ventricular size is normal.  LVEF 43% based on biplane 2D tracing.  The inferior vena cava is normal.      Assessment and Plan:  In summary, this is a very pleasant 44 year old with HFpEF who appears hypervolemic due to dietary indiscretion.  I gave her Metolazone 5 mg today with 40 meq of potassium and changed her Bumex to 5 mg twice daily. She will contact us on Friday to update us on her symptoms.  If her symptoms aren't improved, I will discuss with her cardiologist about possible admission. She will return to [facility] in one week.    1. HFpEF:   Stage C  NYHA CLASS IIIb:  Symptoms with minimal activity, recent dyspnea at rest  BP: 130/83.  Continue Hydralazine and Toprol XL.  Fluid status hypervolemic  Aldosterone antagonist:  Spironolactone 50 mg daily  Ischemia evaluation: Previously  evaluated.  No obstructive CAD  ACEi/ARB - N/a HFpEF  BB - Toprol  mg daily.   SCD prophylaxis - N/A, EF is normal   NSAID use: Contraindicated, reviewed with patient   Sleep Apnea Evaluation: JYOTI  Remote Monitoring:  None.    2.  Morbid obesity:  BMI 79.25.  Followed by weight loss clinic.    3.  PAF:  Chads Vasc score of 3:  Continue Toprol XL and Warfarin.    4. Follow-up:  [facility] in one week with labs.      >40 minutes spent face to face with patient with >50% in counseling, education, and coordination of care      Rajwinder RUST, CNP  [facility]

## 2019-07-23 NOTE — PROGRESS NOTES
HPI: Pricilla Brown is a 44 year old with a past medical history of morbid obesity, afib, DM II, HTN, and JYOTI who returns for follow up.      Pricilla has been feeling more short of breath.  She develops SOB after walking several feet.  She has to stop and rest to catch her breath. The last few days she had to sleep in the sleeping recliner due to orthopnea and PND.  She sn't urinating as much and doesn't feel that the Torsemide is working as effectively. She had not been weighing herself as she doesn't have a battery.  She has no other symptoms.  She has been taking her medications as prescribed and has been taking Metolazone twice weekly.  She has not been following her fluid restrictions and has been drinking more than 3 liters daily.        PAST MEDICAL HISTORY:  Past Medical History:   Diagnosis Date     A-fib (H) 2011    on coumadin since 1/13     Asthma     as a kid     Chest pain 2/1/2017     Chronic anticoagulation for a-fib 2/15/2013    INR's followed by coumadin clinic at      Diabetes mellitus (H) 2012     Diastolic heart failure 2/15/2013     Dilated cardiomyopathy (H) 1/8/2013     HTN (hypertension)      Hyperthyroidism     Graves, s/p I131 1/13, now on prednisone and methimazole     Morbid obesity (H)      Pulmonary embolism (H) 1/12    hospitalized in [place], on lovenox/coumadin for a few months but stopped, hypercoag w/u neg per pt     Sleep apnea     using CPAP       FAMILY HISTORY:  Family History   Problem Relation Age of Onset     Thyroid Disease Mother         Grave's D     Diabetes Mother      Heart Disease Mother      Cerebrovascular Disease Mother         dec. 54 yo     Hypertension Mother      Heart Disease Sister         Heart Murmur     Diabetes Sister      Heart Disease Father         dec in his 30s, MI     Psychotic Disorder Brother         Bipolar     Diabetes Brother      Thyroid Disease Brother         Hyper Thyroid     Heart Disease Brother      Thyroid Disease Sister         Hyper  Thyroid     Thyroid Disease Sister         Hyper Thyroid     Thyroid Disease Sister         Mental Health Problems     Thyroid Disease Maternal Aunt      Thyroid Disease Maternal Uncle      Cancer No family hx of      Glaucoma No family hx of      Macular Degeneration No family hx of        SOCIAL HISTORY:  Social History     Socioeconomic History     Marital status: Single     Spouse name: None     Number of children: None     Years of education: None     Highest education level: None   Occupational History     None   Social Needs     Financial resource strain: None     Food insecurity:     Worry: None     Inability: None     Transportation needs:     Medical: None     Non-medical: None   Tobacco Use     Smoking status: Light Tobacco Smoker     Packs/day: 0.25     Years: 13.00     Pack years: 3.25     Types: Cigarettes     Smokeless tobacco: Never Used     Tobacco comment: down to 5 cigs   Substance and Sexual Activity     Alcohol use: No     Drug use: No     Sexual activity: Yes     Partners: Male     Birth control/protection: Condom   Lifestyle     Physical activity:     Days per week: None     Minutes per session: None     Stress: None   Relationships     Social connections:     Talks on phone: None     Gets together: None     Attends Shinto service: None     Active member of club or organization: None     Attends meetings of clubs or organizations: None     Relationship status: None     Intimate partner violence:     Fear of current or ex partner: None     Emotionally abused: None     Physically abused: None     Forced sexual activity: None   Other Topics Concern     Parent/sibling w/ CABG, MI or angioplasty before 65F 55M? Not Asked   Social History Narrative    Single, no children        Gyn:        Last pap several years ago, no abnormal    No STIs            Patient is single.  She is no longer working.  She used to work in the area of customer service.  She is currently living with her sister in  [place], [place].  She has no pets.  Patient has smoked a half pack of cigarettes a day for the past 10 plus years.  She states that she is down to 5 cigarettes a day with the aid of Wellbutrin.  She does not smoke cigars, pipes or chew tobacco.  She has 1 cup of coffee in the morning.  She does not drink alcohol.  Patient does not exercise.        CURRENT MEDICATIONS:  Current Outpatient Medications   Medication Sig Dispense Refill     acetaminophen (TYLENOL) 325 MG tablet Take 2 tablets (650 mg) by mouth 3 times daily as needed for mild pain or fever (total acetaminophen dose should not exceed 3000 mg per day) 180 tablet 5     albuterol (PROAIR HFA/PROVENTIL HFA/VENTOLIN HFA) 108 (90 Base) MCG/ACT inhaler Inhale 2 puffs into the lungs every 6 hours as needed for shortness of breath / dyspnea 75 g 0     bisacodyl (DULCOLAX) 10 MG suppository Place 1 suppository (10 mg) rectally daily as needed for constipation 6 suppository 0     blood glucose (ACCU-CHEK RON PLUS) test strip Use to test blood sugar 4  times daily or as directed. 360 each 3     blood glucose (NO BRAND SPECIFIED) lancets standard USE TO CHECK  blood sugar fasting each am  prelunch and predinner daily OR AS DIRECTED Call clinic to schedule MD APPT. 400 each 3     blood glucose (NO BRAND SPECIFIED) test strip Use to test blood sugars 3 times daily or as directed 400 strip 3     blood glucose monitoring (IBG STAR) meter device kit -PLEASE GIVE PATIENT A DEVICE HER INSURANCE WILL COVER- 1 kit 0     blood glucose monitoring (NO BRAND SPECIFIED) meter device kit Use to test blood sugar 3 times daily or as directed. 1 kit 1     buPROPion (WELLBUTRIN SR) 100 MG 12 hr tablet Take 1 tablet (100 mg) by mouth 2 times daily 180 tablet 2     capsaicin (ZOSTRIX) 0.025 % external cream Apply 1 g topically 3 times daily as needed 1 Tube 0     ciclopirox (LOPROX) 0.77 % cream Apply topically 2 times daily To feet and toenails. 90 g 6     ciclopirox (PENLAC)  8 % external solution Apply topically daily To toenails. 6.6 mL 0     clotrimazole (LOTRIMIN) 1 % external cream Apply topically 2 times daily as needed (skin irritation) 60 g 1     colchicine (COLCYRS) 0.6 MG tablet Take 1-2 tablets (0.6-1.2 mg) by mouth daily as needed for moderate pain 180 tablet 0     diclofenac (VOLTAREN) 1 % topical gel Apply 4 grams to knees or 2 grams to hands four times daily using enclosed dosing card. 100 g 1     dulaglutide (TRULICITY) 1.5 MG/0.5ML pen Inject 1.5 mg Subcutaneous every 7 days 6 mL 0     DULoxetine (CYMBALTA) 20 MG capsule Take 1 capsule (20 mg) by mouth 2 times daily 60 capsule 0     Efinaconazole 10 % SOLN Externally apply topically daily To toenails. 8 mL 11     empagliflozin (JARDIANCE) 10 MG TABS tablet Take 1 tablet (10 mg) by mouth daily 90 tablet 3     gabapentin (NEURONTIN) 300 MG capsule TAKE TWO CAPSULES (600 MG) BY MOUTH TWICE A  capsule 0     hydrALAZINE (APRESOLINE) 25 MG tablet Take 50-75 mg by mouth Take 2 tabs (50 mg) in am, 2 tabs (50 mg) midday, and 3 tabs (75 mg) in pm daily  450 tablet 3     insulin glargine U-300 (TOUJEO) 300 UNIT/ML injection Inject 170 units SQ each am. 170 mL 1     insulin pen needle (BD RIVER U/F) 32G X 4 MM miscellaneous Use 6 daily or as directed 600 each 3     levothyroxine (SYNTHROID/LEVOTHROID) 200 MCG tablet TAKE ONE TABLET BY MOUTH EVERY DAY MONDAY THROUGH SATURDAY AND TAKE TWO TABLETS ON SUNDAYS 102 tablet 1     metolazone (ZAROXOLYN) 2.5 MG tablet Take 1 tablet (2.5 mg) by mouth 2 times daily Take 1/2 hour prior to torsemide 180 tablet 3     metoprolol succinate ER (TOPROL-XL) 200 MG 24 hr tablet Take 1 tablet (200 mg) by mouth daily 90 tablet 3     nicotine ([employer] NICOTINE) 14 MG/24HR patch 2h hr Place 1 patch onto the skin every 24 hours 30 patch 1     NOVOLOG FLEXPEN 100 UNIT/ML soln Inject 70 units with meals plus correction. Pt uses approx 200 units in 24 hrs. 180 mL 3     nystatin (MYCOSTATIN) 539649 UNIT/GM  external cream Apply topically 2 times daily To toenails 90 g 6     order for DME Left foot 1 Units 0     order for DME Equipment being ordered: [id number] $92   Plantar, fasciitis, LG, night splint 1 each 0     order for DME Equipment being ordered: Challenger Wide walker if available - patient needs seat, basket and brakes. 1 each 0     ORDER FOR DME Use your CPAP device as directed by your provider. Pressure change to min 13 max 18cwp 1 each 99     oxyCODONE-acetaminophen (PERCOCET) 5-325 MG per tablet Take 1 tablet by mouth every 8 hours as needed for moderate to severe pain (try to limit use, no further prescriptions until seen in pain clinic) 60 tablet 0     polyethylene glycol (MIRALAX/GLYCOLAX) powder        potassium chloride ER (K-DUR/KLOR-CON M) 20 MEQ CR tablet Take 2 tablets (40 mEq) by mouth 4 times daily 240 tablet 11     Respiratory Therapy Supplies (NEBULIZER COMPRESSOR) KIT 1 Device 4 times daily as needed. 1 kit 3     senna (SENOKOT) 8.6 MG tablet Take 1 tablet by mouth 2 times daily 45 tablet 0     spironolactone (ALDACTONE) 50 MG tablet Take 1 tablet (50 mg) by mouth daily 90 tablet 1     tiotropium (SPIRIVA HANDIHALER) 18 MCG inhalation capsule Inhale contents of one capsule daily. 30 capsule 1     topiramate (TOPAMAX) 50 MG tablet Take 2 tablets (100 mg) by mouth daily Take 100 mg for 1 month and increase to 150 mg thereafter 270 tablet 3     torsemide (DEMADEX) 100 MG tablet Take 1 tablet (100 mg) by mouth 2 times daily 180 tablet 1     warfarin (COUMADIN) 5 MG tablet Take 20mg on F and 15mg on MTuWThSatSun, or as directed by the Medication Monitoring [facility] at the  of . 280 tablet 1     warfarin (COUMADIN) 5 MG tablet Take 20mgs on Fridays and 15mgs on all other days or as directed by the Coumadin Clinic 100 tablet 3     warfarin (COUMADIN) 5 MG tablet TAKE 3 TABLETS BY MOUTH ON MON, TUES, THURS, SAT, AND SUN AND 4 TABS ON WED AND FRI. 170 tablet 1     warfarin (COUMADIN) 5 MG  "tablet [id number] on MTuThSatSun, and 20mg on WF, or as directed by the Medication Monitoring Clinic at the [facility]. 170 tablet 1     morphine (MS CONTIN) 15 MG 12 hr tablet Take 15 mg by mouth daily as needed         ROS:  Constitutional: Denies fever, chills, or diaphoresis. See HPI   Respiratory:  See HPI.     Cardiovascular: As per HPI.   GI: Denies nausea, vomiting, diarrhea, hematemesis, melena, or hematochezia.   : See HPI  Integument: Negative for bruising, rash, or pruritis.  Psychiatric: Negative for anxiety, depression, sleep disturbance, or mood changes.   Neuro: Negative for headaches, syncope, numbness or tingling.   Endocrinology: +DM and excess thirst  Musculoskeletal: Negative for gout, joint stiffness, swelling, or muscle weakness      EXAM:  /83 (BP Location: Left arm, Patient Position: Chair, Cuff Size: Adult Regular)   Pulse 76   Ht 1.702 m (5' 7\")   Wt (!) 229.5 kg (506 lb)   SpO2 97%   BMI 79.25 kg/m    General: alert, articulate, and in no acute distress.  HEENT: normocephalic, atraumatic, anicteric sclera, EOMI, mucosa moist, no cyanosis.    Neck: Supple.  JVP difficult to discern due to neck girth.   Heart: regular rhythm, normal S1/S2, no murmur, gallop, rub.  Precordium quiet with normal PMI.     Lungs: clear, no rales, ronchi, or wheezing.  No accessory muscle use, respirations unlabored.   Abdomen: soft, non-tender, bowel sounds present, no hepatomegaly  Extremities: 2+ pitting edema.  No cyanosis.  Extremities warm to touch.  Neurological: Alert and oriented x 3.  Normal speech and affect, no gross motor deficits  Skin:  No ecchymoses, rashes, or clubbing.       Labs:  CBC RESULTS:  Lab Results   Component Value Date    WBC 5.0 03/18/2019    RBC 4.67 03/18/2019    HGB 13.5 03/18/2019    HCT 44.0 03/18/2019    MCV 94 03/18/2019    MCH 28.9 03/18/2019    MCHC 30.7 (L) 03/18/2019    RDW 14.0 03/18/2019     03/18/2019       CMP RESULTS:  Lab Results   Component Value Date "     2019    POTASSIUM 4.1 2019    CHLORIDE 101 2019    CO2 31 2019    ANIONGAP 5 2019     (H) 2019    BUN 22 2019    CR 1.18 (H) 2019    GFRESTIMATED 56 (L) 2019    GFRESTBLACK 64 2019    JUSTIN 9.3 2019    BILITOTAL 0.2 10/25/2018    ALBUMIN 2.8 (L) 10/25/2018    ALKPHOS 89 10/25/2018    ALT 19 10/25/2018    AST 9 10/25/2018        INR RESULTS:  Lab Results   Component Value Date    INR 3.49 (H) 2019       No components found for: CK  Lab Results   Component Value Date    MAG 1.8 2015     Lab Results   Component Value Date    NTBNP 37 2017       Most recent echocardiogram 19:  Interpretation Summary  Technically difficult study.Extremely poor acoustic windows.  Limited information was obtained during study.  Left ventricular size is normal.  LVEF 43% based on biplane 2D tracing.  The inferior vena cava is normal.      Assessment and Plan:    In summary, this is a very pleasant 44 year old with HFpEF who appears hypervolemic due to dietary indiscretion.  I gave her Metolazone 5 mg today with 40 meq of potassium and changed her Bumex to 5 mg twice daily. She will contact us on Friday to update us on her symptoms.  If her symptoms aren't improved, I will discuss with her cardiologist about possible admission.       1. HFpEF:   Stage C  NYHA CLASS IIIb:  Symptoms with minimal activity, recent dyspnea at rest  BP: 130/83.  Diurese as outlined above.  Fluid status hypervolemic  Aldosterone antagonist: [facility][id number]}  Ischemia evaluation:   ACEi/ARB -    BB -   SCD prophylaxis - N/A, EF is normal   NSAID use: Contraindicated, reviewed with patient   Sleep Apnea Evaluation:   Remote Monitorin.  Other comordbid conditions:      3.  Follow-up ***      >40 minutes spent face to face with patient with >50% in counseling, education, and coordination of care    Rajwinder RUST, CNP  [facility]

## 2019-07-23 NOTE — PATIENT INSTRUCTIONS
Take your medicines every day, as directed    Changes made today:  o Stop Torsemide.  o Start Bumex 5 mg twice daily.  o We gave you Metolazone 5 mg in clinic today with 40 meq of Potassium  o We will call you on Friday to check symptoms   Monitor Your Weight and Symptoms    Contact us if you:      Gain 2 pounds in one day or 5 pounds in one week    Feel more short of breath    Notice more leg swelling    Feel lightheadeded   Change your lifestyle    Limit Salt or Sodium:    2000 mg  Limit Fluids:    2000 mL or approximately 64 ounces  Eat a Heart Healthy Diet    Low in saturated fats  Stay Active:    Aim to move at least 150 minutes every  week         To Contact us    During Business Hours:  [phone], option # 1 (University)  Then option # 4 (medical questions)     After hours, weekends or holidays:   [phone], Option #4  Ask to speak to the On-Call Cardiologist. Inform them you are a CORE/heart failure patient at the [facility].     Use [employer] allows you to communicate directly with your heart team through secure messaging.    PowerMessage can be accessed any time on your phone, computer, or tablet.    If you need assistance, we'd be happy to help!         Keep your Heart Appointments:    1.  [facility] in one week with labs.

## 2019-07-24 ENCOUNTER — MYC REFILL (OUTPATIENT)
Dept: INTERNAL MEDICINE | Facility: CLINIC | Age: 44
End: 2019-07-24

## 2019-07-24 ENCOUNTER — MYC REFILL (OUTPATIENT)
Dept: ENDOCRINOLOGY | Facility: CLINIC | Age: 44
End: 2019-07-24

## 2019-07-24 ENCOUNTER — MYC REFILL (OUTPATIENT)
Dept: CARDIOLOGY | Facility: CLINIC | Age: 44
End: 2019-07-24

## 2019-07-24 ENCOUNTER — OFFICE VISIT (OUTPATIENT)
Dept: ENDOCRINOLOGY | Facility: CLINIC | Age: 44
End: 2019-07-24
Payer: MEDICARE

## 2019-07-24 VITALS
WEIGHT: 293 LBS | SYSTOLIC BLOOD PRESSURE: 140 MMHG | HEART RATE: 74 BPM | BODY MASS INDEX: 45.99 KG/M2 | DIASTOLIC BLOOD PRESSURE: 93 MMHG | HEIGHT: 67 IN

## 2019-07-24 DIAGNOSIS — M79.672 LEFT FOOT PAIN: ICD-10-CM

## 2019-07-24 DIAGNOSIS — I50.23 ACUTE ON CHRONIC SYSTOLIC HEART FAILURE (H): ICD-10-CM

## 2019-07-24 DIAGNOSIS — M79.2 NEUROPATHIC PAIN OF LOWER EXTREMITY, UNSPECIFIED LATERALITY: ICD-10-CM

## 2019-07-24 DIAGNOSIS — B35.1 DERMATOPHYTOSIS OF NAIL: ICD-10-CM

## 2019-07-24 DIAGNOSIS — Z79.4 TYPE 2 DIABETES MELLITUS WITH HYPERGLYCEMIA, WITH LONG-TERM CURRENT USE OF INSULIN (H): Primary | ICD-10-CM

## 2019-07-24 DIAGNOSIS — E66.01 MORBID OBESITY (H): ICD-10-CM

## 2019-07-24 DIAGNOSIS — M10.9 ACUTE GOUTY ARTHRITIS: ICD-10-CM

## 2019-07-24 DIAGNOSIS — E11.9 TYPE 2 DIABETES MELLITUS (H): ICD-10-CM

## 2019-07-24 DIAGNOSIS — I50.32 CHRONIC DIASTOLIC HEART FAILURE (H): ICD-10-CM

## 2019-07-24 DIAGNOSIS — E11.42 TYPE 2 DIABETES MELLITUS WITH DIABETIC POLYNEUROPATHY, WITHOUT LONG-TERM CURRENT USE OF INSULIN (H): ICD-10-CM

## 2019-07-24 DIAGNOSIS — Z72.0 TOBACCO ABUSE: ICD-10-CM

## 2019-07-24 DIAGNOSIS — E11.65 TYPE 2 DIABETES MELLITUS WITH HYPERGLYCEMIA, WITH LONG-TERM CURRENT USE OF INSULIN (H): Primary | ICD-10-CM

## 2019-07-24 DIAGNOSIS — L02.221 FURUNCLE OF ABDOMINAL WALL: ICD-10-CM

## 2019-07-24 DIAGNOSIS — E87.6 HYPOKALEMIA: ICD-10-CM

## 2019-07-24 LAB
HBA1C MFR BLD: 10.1 % (ref 4.3–6)
INTERPRETATION ECG - MUSE: NORMAL

## 2019-07-24 RX ORDER — SPIRONOLACTONE 50 MG/1
50 TABLET, FILM COATED ORAL DAILY
Qty: 30 TABLET | Refills: 3 | Status: SHIPPED | OUTPATIENT
Start: 2019-07-24 | End: 2020-04-02

## 2019-07-24 RX ORDER — CAPSAICIN 0.025 %
1 CREAM (GRAM) TOPICAL 3 TIMES DAILY PRN
Qty: 1 TUBE | Refills: 0 | Status: SHIPPED | OUTPATIENT
Start: 2019-07-24 | End: 2020-04-02

## 2019-07-24 RX ORDER — METOPROLOL SUCCINATE 200 MG/1
200 TABLET, EXTENDED RELEASE ORAL DAILY
Qty: 90 TABLET | Refills: 3 | Status: CANCELLED | OUTPATIENT
Start: 2019-07-24

## 2019-07-24 RX ORDER — METOLAZONE 2.5 MG/1
2.5 TABLET ORAL 2 TIMES DAILY
Qty: 180 TABLET | Refills: 3 | Status: CANCELLED | OUTPATIENT
Start: 2019-07-24

## 2019-07-24 RX ORDER — NICOTINE 21 MG/24HR
1 PATCH, TRANSDERMAL 24 HOURS TRANSDERMAL EVERY 24 HOURS
Qty: 30 PATCH | Refills: 0 | Status: SHIPPED | OUTPATIENT
Start: 2019-07-24 | End: 2019-10-29

## 2019-07-24 RX ORDER — BUPROPION HYDROCHLORIDE 100 MG/1
100 TABLET, EXTENDED RELEASE ORAL 2 TIMES DAILY
Qty: 180 TABLET | Refills: 0 | Status: SHIPPED | OUTPATIENT
Start: 2019-07-24 | End: 2021-11-23

## 2019-07-24 RX ORDER — ACETAMINOPHEN 325 MG/1
650 TABLET ORAL 3 TIMES DAILY PRN
Qty: 180 TABLET | Refills: 0 | Status: SHIPPED | OUTPATIENT
Start: 2019-07-24

## 2019-07-24 RX ORDER — POTASSIUM CHLORIDE 1500 MG/1
40 TABLET, EXTENDED RELEASE ORAL 4 TIMES DAILY
Qty: 240 TABLET | Refills: 11 | OUTPATIENT
Start: 2019-07-24

## 2019-07-24 RX ORDER — TOPIRAMATE 50 MG/1
150 TABLET, FILM COATED ORAL DAILY
Qty: 90 TABLET | Refills: 1 | Status: SHIPPED | OUTPATIENT
Start: 2019-07-24 | End: 2021-11-23

## 2019-07-24 RX ORDER — COLCHICINE 0.6 MG/1
.6-1.2 TABLET ORAL DAILY PRN
Qty: 180 TABLET | Refills: 0 | Status: ON HOLD | OUTPATIENT
Start: 2019-07-24 | End: 2023-01-01

## 2019-07-24 ASSESSMENT — ENCOUNTER SYMPTOMS
DYSPNEA ON EXERTION: 1
PALPITATIONS: 1
SLEEP DISTURBANCES DUE TO BREATHING: 1
ORTHOPNEA: 1
BACK PAIN: 1
SNORES LOUDLY: 1
FLANK PAIN: 0
ALTERED TEMPERATURE REGULATION: 1
HOT FLASHES: 0
NECK PAIN: 1
NIGHT SWEATS: 0
DIZZINESS: 0
MUSCLE CRAMPS: 1
HYPOTENSION: 0
SPEECH CHANGE: 0
BRUISES/BLEEDS EASILY: 1
MUSCLE WEAKNESS: 0
POSTURAL DYSPNEA: 1
WEIGHT LOSS: 0
SEIZURES: 0
JOINT SWELLING: 0
HEMATURIA: 0
TINGLING: 1
ARTHRALGIAS: 0
HEADACHES: 1
HYPERTENSION: 1
HALLUCINATIONS: 0
COUGH: 0
MYALGIAS: 1
FATIGUE: 0
COUGH DISTURBING SLEEP: 0
HEMOPTYSIS: 0
STIFFNESS: 0
SHORTNESS OF BREATH: 1
LOSS OF CONSCIOUSNESS: 0
EYE WATERING: 1
POLYDIPSIA: 1
WEIGHT GAIN: 1
SYNCOPE: 0
EXERCISE INTOLERANCE: 0
SPUTUM PRODUCTION: 0
DYSURIA: 0
WHEEZING: 1
LIGHT-HEADEDNESS: 0
LEG PAIN: 1
DOUBLE VISION: 0
EYE PAIN: 0
MEMORY LOSS: 0
INCREASED ENERGY: 1
FEVER: 0
CHILLS: 0
SWOLLEN GLANDS: 0
DECREASED APPETITE: 0
DISTURBANCES IN COORDINATION: 0
DECREASED LIBIDO: 0

## 2019-07-24 ASSESSMENT — MIFFLIN-ST. JEOR: SCORE: 2916.58

## 2019-07-24 NOTE — PROGRESS NOTES
HPI  Pricilla Brown is a 44 year old female with type 2 diabetes mellitus here today for a follow up visit.  I last saw her in clinic in Oct 2018.  [facility] is [facility] in [facility].  Pt's hx is also significant for morbid obesity, dilated cardiomyopathy with diastolic dysfunction, atrial fibrillation, hx of PE, HTN, Grave's disease, peripheral neuropathy and sleep apnea.  For her diabetes, pt was to take Trulicity 1.5 mg SQ once a week,Toujeo 170 units SQ each am and Novolog 65 units with meals, plus correction insulin ( 2 units/50 for BG > 150 and Jardiance 10 mg daily.  Her A1C is 10.1 % today and her previous A1C was 8.4 %.      Her previous A1C was 9.6 % in May 2018.  Pt has no glucose meter today.  She states has had no test strips.  On ROS today, pt has intermittent blurred vision.  She has SOB and is using her CPAP. Mild cough. No fevers or chills.  She is smoking approx 3-5 cigs per day.  She has numbness and tingling in both feet and hands.  Some urinary urgency. No dysuria, hematuria or urine with bad odor.  Her weight is down after receiving Bumex.  Pt denies nausea, vomiting, chest pain, abd pain, diarrhea, blood in the stool or melena.  No foot ulcers.    ROS  Please see under HPI.    Allergies  Allergies   Allergen Reactions     Penicillins Other (See Comments)     CHILDHOOD ALLERGY     Ibuprofen Sodium Hives and Rash       Medications  Current Outpatient Medications   Medication Sig Dispense Refill     dulaglutide (TRULICITY) 1.5 MG/0.5ML pen Inject 1.5 mg Subcutaneous every 7 days 6 mL 3     empagliflozin (JARDIANCE) 25 MG TABS tablet Take 1 tablet (25 mg) by mouth daily 90 tablet 3     insulin glargine U-300 (TOUJEO) 300 UNIT/ML injection Inject 170 units SQ each am. 170 mL 3     acetaminophen (TYLENOL) 325 MG tablet Take 2 tablets (650 mg) by mouth 3 times daily as needed for mild pain or fever (total acetaminophen dose should not exceed 3000 mg per day) 180 tablet 0     albuterol (PROAIR  HFA/PROVENTIL HFA/VENTOLIN HFA) 108 (90 Base) MCG/ACT inhaler Inhale 2 puffs into the lungs every 6 hours as needed for shortness of breath / dyspnea 75 g 0     bisacodyl (DULCOLAX) 10 MG suppository Place 1 suppository (10 mg) rectally daily as needed for constipation 6 suppository 0     blood glucose (ACCU-CHEK RON PLUS) test strip Use to test blood sugar 4  times daily or as directed. 360 each 3     blood glucose (NO BRAND SPECIFIED) lancets standard USE TO CHECK  blood sugar fasting each am  prelunch and predinner daily OR AS DIRECTED Call clinic to schedule MD APPT. 400 each 3     blood glucose (NO BRAND SPECIFIED) test strip Use to test blood sugars 3 times daily or as directed 400 strip 3     blood glucose monitoring (IBG STAR) meter device kit -PLEASE GIVE PATIENT A DEVICE HER INSURANCE WILL COVER- 1 kit 0     blood glucose monitoring (NO BRAND SPECIFIED) meter device kit Use to test blood sugar 3 times daily or as directed. 1 kit 1     bumetanide (BUMEX) 1 MG tablet Take 5 tablets (5 mg) by mouth 2 times daily 300 tablet 11     buPROPion (WELLBUTRIN SR) 100 MG 12 hr tablet Take 1 tablet (100 mg) by mouth 2 times daily 180 tablet 0     capsaicin (ZOSTRIX) 0.025 % external cream Apply 1 g topically 3 times daily as needed 1 Tube 0     ciclopirox (LOPROX) 0.77 % cream Apply topically 2 times daily To feet and toenails. 90 g 6     ciclopirox (PENLAC) 8 % external solution Apply topically daily To toenails. 6.6 mL 0     clotrimazole (LOTRIMIN) 1 % external cream Apply topically 2 times daily as needed (skin irritation) 60 g 1     colchicine (COLCYRS) 0.6 MG tablet Take 1-2 tablets (0.6-1.2 mg) by mouth daily as needed for moderate pain 180 tablet 0     diclofenac (VOLTAREN) 1 % topical gel Apply 4 grams to knees or 2 grams to hands four times daily using enclosed dosing card. 100 g 1     DULoxetine (CYMBALTA) 20 MG capsule Take 1 capsule (20 mg) by mouth 2 times daily 60 capsule 0     Efinaconazole 10 % SOLN  Externally apply topically daily To toenails. 8 mL 11     gabapentin (NEURONTIN) 300 MG capsule TAKE TWO CAPSULES (600 MG) BY MOUTH TWICE A  capsule 0     hydrALAZINE (APRESOLINE) 25 MG tablet Take 50-75 mg by mouth Take 2 tabs (50 mg) in am, 2 tabs (50 mg) midday, and 3 tabs (75 mg) in pm daily  450 tablet 3     insulin pen needle (BD RIVER U/F) 32G X 4 MM miscellaneous Use 6 daily or as directed 600 each 3     levothyroxine (SYNTHROID/LEVOTHROID) 200 MCG tablet TAKE ONE TABLET BY MOUTH EVERY DAY MONDAY THROUGH SATURDAY AND TAKE TWO TABLETS ON SUNDAYS 102 tablet 1     metolazone (ZAROXOLYN) 2.5 MG tablet Take 1 tablet (2.5 mg) by mouth 2 times daily Take 1/2 hour prior to torsemide 180 tablet 3     metoprolol succinate ER (TOPROL-XL) 200 MG 24 hr tablet Take 1 tablet (200 mg) by mouth daily 90 tablet 3     morphine (MS CONTIN) 15 MG 12 hr tablet Take 15 mg by mouth daily as needed       nicotine ([employer] NICOTINE) 14 MG/24HR 24 hr patch Place 1 patch onto the skin every 24 hours 30 patch 0     NOVOLOG FLEXPEN 100 UNIT/ML soln Inject 70 units with meals plus correction. Pt uses approx 200 units in 24 hrs. 180 mL 3     nystatin (MYCOSTATIN) 218578 UNIT/GM external cream Apply topically 2 times daily To toenails 90 g 6     order for DME Left foot 1 Units 0     order for DME Equipment being ordered: [id number] $92   Plantar, fasciitis, LG, night splint 1 each 0     order for DME Equipment being ordered: Challenger Herminia walker if available - patient needs seat, basket and brakes. 1 each 0     ORDER FOR DME Use your CPAP device as directed by your provider. Pressure change to min 13 max [id number] 1 each 99     oxyCODONE-acetaminophen (PERCOCET) 5-325 MG per tablet Take 1 tablet by mouth every 8 hours as needed for moderate to severe pain (try to limit use, no further prescriptions until seen in pain clinic) 60 tablet 0     polyethylene glycol (MIRALAX/GLYCOLAX) powder        potassium chloride ER (K-DUR/KLOR-CON M)  20 MEQ CR tablet Take 2 tablets (40 mEq) by mouth 4 times daily 240 tablet 11     Respiratory Therapy Supplies (NEBULIZER COMPRESSOR) KIT 1 Device 4 times daily as needed. 1 kit 3     senna (SENOKOT) 8.6 MG tablet Take 1 tablet by mouth 2 times daily 45 tablet 0     spironolactone (ALDACTONE) 50 MG tablet Take 1 tablet (50 mg) by mouth daily 30 tablet 3     tiotropium (SPIRIVA HANDIHALER) 18 MCG inhalation capsule Inhale contents of one capsule daily. 30 capsule 1     topiramate (TOPAMAX) 50 MG tablet Take 3 tablets (150 mg) by mouth daily 90 tablet 1     warfarin (COUMADIN) 5 MG tablet Take 20mg on F and 15mg on MTuWThSatSun, or as directed by the [facility] the [facility] 280 tablet 1     warfarin (COUMADIN) 5 MG tablet Take 20mgs on Fridays and 15mgs on all other days or as directed by the Coumadin Clinic 100 tablet 3     warfarin (COUMADIN) 5 MG tablet TAKE 3 TABLETS BY MOUTH ON MON, TUES, THURS, SAT, AND SUN AND 4 TABS ON WED AND FRI. 170 tablet 1     warfarin (COUMADIN) 5 MG tablet [id number] on MTuThSatSun, and 20mg on WF, or as directed by the [facility] at the [facility] 170 tablet 1       Family History  family history includes Cerebrovascular Disease in her mother; Diabetes in her brother, mother, and sister; Heart Disease in her brother, father, mother, and sister; Hypertension in her mother; Psychotic Disorder in her brother; Thyroid Disease in her brother, maternal aunt, maternal uncle, mother, sister, sister, and sister.    Social History  Smoke: yes- smoking 3-5 cigs/day.  ETOH: rare.  Lives with significant other.  No children.      Past Medical History  Past Medical History:   Diagnosis Date     A-fib (H) 2011    on coumadin since 1/13     Asthma     as a kid     Chest pain 2/1/2017     Chronic anticoagulation for a-fib 2/15/2013    INR's followed by coumadin clinic at      Diabetes mellitus (H) 2012     Diastolic heart failure 2/15/2013     Dilated cardiomyopathy  "(H) 1/8/2013     HTN (hypertension)      Hyperthyroidism     Graves, s/p I131 1/13, now on prednisone and methimazole     Morbid obesity (H)      Pulmonary embolism (H) 1/12    hospitalized in [place], on lovenox/coumadin for a few months but stopped, hypercoag w/u neg per pt     Sleep apnea     using CPAP         Physical Exam  BP (!) 140/93   Pulse 74   Ht 1.702 m (5' 7\")   Wt (!) 223.9 kg (493 lb 9.6 oz)   BMI 77.31 kg/m    Body mass index is 77.31 kg/m .     BP high- she has not taken any BP meds yet today.    RESULTS  Creatinine   Date Value Ref Range Status   07/23/2019 0.97 0.52 - 1.04 mg/dL Final     GFR Estimate   Date Value Ref Range Status   07/23/2019 71 >60 mL/min/[1.73_m2] Final     Comment:     Non  GFR Calc  Starting 12/18/2018, serum creatinine based estimated GFR (eGFR) will be   calculated using the Chronic Kidney Disease Epidemiology Collaboration   (CKD-EPI) equation.       Hemoglobin A1C   Date Value Ref Range Status   10/11/2018 8.4 (H) 0 - 5.6 % Final     Comment:     Normal <5.7% Prediabetes 5.7-6.4%  Diabetes 6.5% or higher - adopted from ADA   consensus guidelines.       Potassium   Date Value Ref Range Status   07/23/2019 4.1 3.4 - 5.3 mmol/L Final     ALT   Date Value Ref Range Status   10/25/2018 19 0 - 50 U/L Final     AST   Date Value Ref Range Status   10/25/2018 9 0 - 45 U/L Final     TSH   Date Value Ref Range Status   10/11/2018 2.06 0.40 - 4.00 mU/L Final     T4 Free   Date Value Ref Range Status   10/11/2018 0.95 0.76 - 1.46 ng/dL Final       Cholesterol   Date Value Ref Range Status   10/11/2018 136 <200 mg/dL Final   10/26/2016 135 <200 mg/dL Final     HDL Cholesterol   Date Value Ref Range Status   10/11/2018 37 (L) >49 mg/dL Final   10/26/2016 37 (L) >49 mg/dL Final     LDL Cholesterol Calculated   Date Value Ref Range Status   10/11/2018 70 <100 mg/dL Final     Comment:     Desirable:       <100 mg/dl   10/26/2016 68 <100 mg/dL Final     Comment:     " Desirable:       <100 mg/dl     Triglycerides   Date Value Ref Range Status   10/11/2018 143 <150 mg/dL Final   10/26/2016 151 (H) <150 mg/dL Final     Comment:     Borderline high:  150-199 mg/dl   High:             200-499 mg/dl   Very high:       >499 mg/dl   Non Fasting       Cholesterol/HDL Ratio   Date Value Ref Range Status   07/14/2015 3.4 0.0 - 5.0 Final   01/06/2013 3.6 0.0 - 5.0 Final       A1C 10.1   7/24/2019  A1C  8.4    10/11/2018  A1C  8.9    2/7/2018  A1C  8.8     2/7/2017  A1C  9.0    11/10/2016  A1C 10.7   6/16/2016  A1C 12.1   3/10/2016    ASSESSMENT/PLAN:    1.  TYPE 2 DIABETES MELLITUS: Uncontrolled type 2 diabetes mellitus.  Pricilla is to check her blood sugar 4 times daily due to poorly controlled diabetes and fluctuation in her glycemia control.  She was instructed to increase Novolog 70 units with meals, plus correction insulin ( 2 units/50 for BG > 150 )  and increase Jardiance 25 mg each am.  Pt is to remain on  Trulicity 1.5 mg SQ once a week and   Toujeo 170 units SQ each am.   She does not tolerate Metformin due to GI distress.  She is to return to clinic next week with her glucose meter and blood sugar values and additional insulin adjustments will be made if needed.  She was seen by [facility] in June 2018 without retinopathy.   Pt's LDL is 70 in Oct 2018. She is not taking a statin at this time.  Pt is taking Coumadin.    2.  HTN: Continue current RXs. Her BP is high today- she has not taken any BP meds yet today.    3.  CARDIOMYOPATHY/A FIB:  Pt followed here by Cardiology staff.      4. GRAVE'S DISEASE:  Hx of Grave's disease s/p I 131 in Jan 2013.    Pt became hypothyroid following above treatment and is taking  Levothyroxine 200 mcg 1 pill Monday - Saturday and 2 pills on Sundays.  Her TSH was normal in Oct 2018.    5.  OBESITY:  Morbid obesity with BMI > 70 kg/m2.  She has been seen by the [facility] staff.  She needs to lose weight prior to being considered for bariatric  surgery.    6.  Return to Endocrine Clinic next week.

## 2019-07-24 NOTE — PATIENT INSTRUCTIONS
Increase Jardiance 25 mg daily. Stop the Jardiance 10 mg tablet.  Increase Novolog 70 units with meals.  Continue Toujeo 170 units each am.  Continue Trulicity 1.5 mg once a week.  See me next week with your glucose meter/blood sugar values.  Isela Archibald PA-C

## 2019-07-24 NOTE — LETTER
7/24/2019       RE: Pricilla Brown  [address]  [facility] [address]     Dear Colleague,    Thank you for referring your patient, Pricilla Brown, to the [facility] ENDOCRINOLOGY at [facility]. Please see a copy of my visit note below.    HPI  Pricilla Brown is a 44 year old female with type 2 diabetes mellitus here today for a follow up visit.  I last saw her in clinic in Oct 2018.  She is also seen in the [facility].  Pt's hx is also significant for morbid obesity, dilated cardiomyopathy with diastolic dysfunction, atrial fibrillation, hx of PE, HTN, Grave's disease, peripheral neuropathy and sleep apnea.  For her diabetes, pt was to take Trulicity 1.5 mg SQ once a week,Toujeo 170 units SQ each am and Novolog 65 units with meals, plus correction insulin ( 2 units/50 for BG > 150 and Jardiance 10 mg daily.  Her A1C is 10.1 % today and her previous A1C was 8.4 %.      Her previous A1C was 9.6 % in May 2018.  Pt has no glucose meter today.  She states has had no test strips.  On ROS today, pt has intermittent blurred vision.  She has SOB and is using her CPAP. Mild cough. No fevers or chills.  She is smoking approx 3-5 cigs per day.  She has numbness and tingling in both feet and hands.  Some urinary urgency. No dysuria, hematuria or urine with bad odor.  Her weight is down after receiving Bumex.  Pt denies nausea, vomiting, chest pain, abd pain, diarrhea, blood in the stool or melena.  No foot ulcers.    ROS  Please see under HPI.    Allergies  Allergies   Allergen Reactions     Penicillins Other (See Comments)     CHILDHOOD ALLERGY     Ibuprofen Sodium Hives and Rash       Medications  Current Outpatient Medications   Medication Sig Dispense Refill     dulaglutide (TRULICITY) 1.5 MG/0.5ML pen Inject 1.5 mg Subcutaneous every 7 days 6 mL 3     empagliflozin (JARDIANCE) 25 MG TABS tablet Take 1 tablet (25 mg) by mouth daily 90 tablet 3     insulin glargine U-300  (TOUJEO) 300 UNIT/ML injection Inject 170 units SQ each am. 170 mL 3     acetaminophen (TYLENOL) 325 MG tablet Take 2 tablets (650 mg) by mouth 3 times daily as needed for mild pain or fever (total acetaminophen dose should not exceed 3000 mg per day) 180 tablet 0     albuterol (PROAIR HFA/PROVENTIL HFA/VENTOLIN HFA) 108 (90 Base) MCG/ACT inhaler Inhale 2 puffs into the lungs every 6 hours as needed for shortness of breath / dyspnea 75 g 0     bisacodyl (DULCOLAX) 10 MG suppository Place 1 suppository (10 mg) rectally daily as needed for constipation 6 suppository 0     blood glucose (ACCU-CHEK RON PLUS) test strip Use to test blood sugar 4  times daily or as directed. 360 each 3     blood glucose (NO BRAND SPECIFIED) lancets standard USE TO CHECK  blood sugar fasting each am  prelunch and predinner daily OR AS DIRECTED Call clinic to schedule MD APPT. 400 each 3     blood glucose (NO BRAND SPECIFIED) test strip Use to test blood sugars 3 times daily or as directed 400 strip 3     blood glucose monitoring (IBG STAR) meter device kit -PLEASE GIVE PATIENT A DEVICE HER INSURANCE WILL COVER- 1 kit 0     blood glucose monitoring (NO BRAND SPECIFIED) meter device kit Use to test blood sugar 3 times daily or as directed. 1 kit 1     bumetanide (BUMEX) 1 MG tablet Take 5 tablets (5 mg) by mouth 2 times daily 300 tablet 11     buPROPion (WELLBUTRIN SR) 100 MG 12 hr tablet Take 1 tablet (100 mg) by mouth 2 times daily 180 tablet 0     capsaicin (ZOSTRIX) 0.025 % external cream Apply 1 g topically 3 times daily as needed 1 Tube 0     ciclopirox (LOPROX) 0.77 % cream Apply topically 2 times daily To feet and toenails. 90 g 6     ciclopirox (PENLAC) 8 % external solution Apply topically daily To toenails. 6.6 mL 0     clotrimazole (LOTRIMIN) 1 % external cream Apply topically 2 times daily as needed (skin irritation) 60 g 1     colchicine (COLCYRS) 0.6 MG tablet Take 1-2 tablets (0.6-1.2 mg) by mouth daily as needed for  moderate pain 180 tablet 0     diclofenac (VOLTAREN) 1 % topical gel Apply 4 grams to knees or 2 grams to hands four times daily using enclosed dosing card. 100 g 1     DULoxetine (CYMBALTA) 20 MG capsule Take 1 capsule (20 mg) by mouth 2 times daily 60 capsule 0     Efinaconazole 10 % SOLN Externally apply topically daily To toenails. 8 mL 11     gabapentin (NEURONTIN) 300 MG capsule TAKE TWO CAPSULES (600 MG) BY MOUTH TWICE A  capsule 0     hydrALAZINE (APRESOLINE) 25 MG tablet Take 50-75 mg by mouth Take 2 tabs (50 mg) in am, 2 tabs (50 mg) midday, and 3 tabs (75 mg) in pm daily  450 tablet 3     insulin pen needle (BD RIVER U/F) 32G X 4 MM miscellaneous Use 6 daily or as directed 600 each 3     levothyroxine (SYNTHROID/LEVOTHROID) 200 MCG tablet TAKE ONE TABLET BY MOUTH EVERY DAY MONDAY THROUGH SATURDAY AND TAKE TWO TABLETS ON SUNDAYS 102 tablet 1     metolazone (ZAROXOLYN) 2.5 MG tablet Take 1 tablet (2.5 mg) by mouth 2 times daily Take 1/2 hour prior to torsemide 180 tablet 3     metoprolol succinate ER (TOPROL-XL) 200 MG 24 hr tablet Take 1 tablet (200 mg) by mouth daily 90 tablet 3     morphine (MS CONTIN) 15 MG 12 hr tablet Take 15 mg by mouth daily as needed       nicotine ([employer] NICOTINE) 14 MG/24HR 24 hr patch Place 1 patch onto the skin every 24 hours 30 patch 0     NOVOLOG FLEXPEN 100 UNIT/ML soln Inject 70 units with meals plus correction. Pt uses approx 200 units in 24 hrs. 180 mL 3     nystatin (MYCOSTATIN) 223733 UNIT/GM external cream Apply topically 2 times daily To toenails 90 g 6     order for DME Left foot 1 Units 0     order for DME Equipment being ordered: [id number] $92   Plantar, fasciitis, LG, night splint 1 each 0     order for DME Equipment being ordered: Challenger Wide walker if available - patient needs seat, basket and brakes. 1 each 0     ORDER FOR DME Use your CPAP device as directed by your provider. Pressure change to min 13 max [id number] 1 each 99      oxyCODONE-acetaminophen (PERCOCET) 5-325 MG per tablet Take 1 tablet by mouth every 8 hours as needed for moderate to severe pain (try to limit use, no further prescriptions until seen in pain clinic) 60 tablet 0     polyethylene glycol (MIRALAX/GLYCOLAX) powder        potassium chloride ER (K-DUR/KLOR-CON M) 20 MEQ CR tablet Take 2 tablets (40 mEq) by mouth 4 times daily 240 tablet 11     Respiratory Therapy Supplies (NEBULIZER COMPRESSOR) KIT 1 Device 4 times daily as needed. 1 kit 3     senna (SENOKOT) 8.6 MG tablet Take 1 tablet by mouth 2 times daily 45 tablet 0     spironolactone (ALDACTONE) 50 MG tablet Take 1 tablet (50 mg) by mouth daily 30 tablet 3     tiotropium (SPIRIVA HANDIHALER) 18 MCG inhalation capsule Inhale contents of one capsule daily. 30 capsule 1     topiramate (TOPAMAX) 50 MG tablet Take 3 tablets (150 mg) by mouth daily 90 tablet 1     warfarin (COUMADIN) 5 MG tablet Take 20mg on F and 15mg on MTuWThSatSun, or as directed by the [facility] at the [facility] 280 tablet 1     warfarin (COUMADIN) 5 MG tablet Take 20mgs on Fridays and 15mgs on all other days or as directed by the Coumadin Clinic 100 tablet 3     warfarin (COUMADIN) 5 MG tablet TAKE 3 TABLETS BY MOUTH ON MON, TUES, THURS, SAT, AND SUN AND 4 TABS ON WED AND FRI. 170 tablet 1     warfarin (COUMADIN) 5 MG tablet [id number] on MTuThSatSun, and 20mg on WF, or as directed by the [facility] at the [facility] 170 tablet 1       Family History  family history includes Cerebrovascular Disease in her mother; Diabetes in her brother, mother, and sister; Heart Disease in her brother, father, mother, and sister; Hypertension in her mother; Psychotic Disorder in her brother; Thyroid Disease in her brother, maternal aunt, maternal uncle, mother, sister, sister, and sister.    Social History  Smoke: yes- smoking 3-5 cigs/day.  ETOH: rare.  Lives with significant other.  No children.      Past Medical History  Past  "Medical History:   Diagnosis Date     A-fib (H) 2011    on coumadin since 1/13     Asthma     as a kid     Chest pain 2/1/2017     Chronic anticoagulation for a-fib 2/15/2013    INR's followed by coumadin clinic at      Diabetes mellitus (H) 2012     Diastolic heart failure 2/15/2013     Dilated cardiomyopathy (H) 1/8/2013     HTN (hypertension)      Hyperthyroidism     Graves, s/p I131 1/13, now on prednisone and methimazole     Morbid obesity (H)      Pulmonary embolism (H) 1/12    hospitalized in [place], on lovenox/coumadin for a few months but stopped, hypercoag w/u neg per pt     Sleep apnea     using CPAP         Physical Exam  BP (!) 140/93   Pulse 74   Ht 1.702 m (5' 7\")   Wt (!) 223.9 kg (493 lb 9.6 oz)   BMI 77.31 kg/m     Body mass index is 77.31 kg/m .     BP high- she has not taken any BP meds yet today.    RESULTS  Creatinine   Date Value Ref Range Status   07/23/2019 0.97 0.52 - 1.04 mg/dL Final     GFR Estimate   Date Value Ref Range Status   07/23/2019 71 >60 mL/min/[1.73_m2] Final     Comment:     Non  GFR Calc  Starting 12/18/2018, serum creatinine based estimated GFR (eGFR) will be   calculated using the Chronic Kidney Disease Epidemiology Collaboration   (CKD-EPI) equation.       Hemoglobin A1C   Date Value Ref Range Status   10/11/2018 8.4 (H) 0 - 5.6 % Final     Comment:     Normal <5.7% Prediabetes 5.7-6.4%  Diabetes 6.5% or higher - adopted from ADA   consensus guidelines.       Potassium   Date Value Ref Range Status   07/23/2019 4.1 3.4 - 5.3 mmol/L Final     ALT   Date Value Ref Range Status   10/25/2018 19 0 - 50 U/L Final     AST   Date Value Ref Range Status   10/25/2018 9 0 - 45 U/L Final     TSH   Date Value Ref Range Status   10/11/2018 2.06 0.40 - 4.00 mU/L Final     T4 Free   Date Value Ref Range Status   10/11/2018 0.95 0.76 - 1.46 ng/dL Final       Cholesterol   Date Value Ref Range Status   10/11/2018 136 <200 mg/dL Final   10/26/2016 135 <200 mg/dL Final "     HDL Cholesterol   Date Value Ref Range Status   10/11/2018 37 (L) >49 mg/dL Final   10/26/2016 37 (L) >49 mg/dL Final     LDL Cholesterol Calculated   Date Value Ref Range Status   10/11/2018 70 <100 mg/dL Final     Comment:     Desirable:       <100 mg/dl   10/26/2016 68 <100 mg/dL Final     Comment:     Desirable:       <100 mg/dl     Triglycerides   Date Value Ref Range Status   10/11/2018 143 <150 mg/dL Final   10/26/2016 151 (H) <150 mg/dL Final     Comment:     Borderline high:  150-199 mg/dl   High:             200-499 mg/dl   Very high:       >499 mg/dl   Non Fasting       Cholesterol/HDL Ratio   Date Value Ref Range Status   07/14/2015 3.4 0.0 - 5.0 Final   01/06/2013 3.6 0.0 - 5.0 Final       A1C 10.1   7/24/2019  A1C  8.4    10/11/2018  A1C  8.9    2/7/2018  A1C  8.8     2/7/2017  A1C  9.0    11/10/2016  A1C 10.7   6/16/2016  A1C 12.1   3/10/2016    ASSESSMENT/PLAN:    1.  TYPE 2 DIABETES MELLITUS: Uncontrolled type 2 diabetes mellitus.  Pricilla is to check her blood sugar 4 times daily due to poorly controlled diabetes and fluctuation in her glycemia control.  She was instructed to increase Novolog 70 units with meals, plus correction insulin ( 2 units/50 for BG > 150 )  and increase Jardiance 25 mg each am.  Pt is to remain on  Trulicity 1.5 mg SQ once a week and   Toujeo 170 units SQ each am.   She does not tolerate Metformin due to GI distress.  She is to return to clinic next week with her glucose meter and blood sugar values and additional insulin adjustments will be made if needed.  She was seen by [facility] in June 2018 without retinopathy.   Pt's LDL is 70 in Oct 2018. She is not taking a statin at this time.  Pt is taking Coumadin.    2.  HTN: Continue current RXs. Her BP is high today- she has not taken any BP meds yet today.    3.  CARDIOMYOPATHY/A FIB:  Pt followed here by Cardiology staff.      4. GRAVE'S DISEASE:  Hx of Grave's disease s/p I 131 in Jan 2013.    Pt became hypothyroid following  above treatment and is taking  Levothyroxine 200 mcg 1 pill Monday - Saturday and 2 pills on Sundays.  Her TSH was normal in Oct 2018.    5.  OBESITY:  Morbid obesity with BMI > 70 kg/m2.  She has been seen by the [facility] staff.  She needs to lose weight prior to being considered for bariatric surgery.    6.  Return to Endocrine Clinic next week.      Again, thank you for allowing me to participate in the care of your patient.      Sincerely,    Isela Archibald PA-C

## 2019-07-24 NOTE — TELEPHONE ENCOUNTER
Last Clinic Visit: 9/20/2018  [facility]  Scheduling has been notified to contact the pt for appointment. - annual physical needed

## 2019-07-24 NOTE — TELEPHONE ENCOUNTER
Last Clinic Visit: 9/20/2018  [facility]  Scheduling has been notified to contact the pt for appointment.  Uric Acid needed - clinic notified

## 2019-07-28 NOTE — PROGRESS NOTES
HPI: Pricilla Brown is a 44 year old with a past medical history of morbid obesity, afib, DM II, HTN, NICM with an EF of 43%, and JYOTI who returns for follow up.      Pricilla has been feeling more short of breath.  She develops SOB after walking several feet and has to stop to catch her breath. The last few days she had to sleep in the recliner due to orthopnea and PND.  She isn't urinating as much and doesn't feel that the Torsemide is working as effectively. She has not been weighing herself as she doesn't have a battery for her scale.  She has no other symptoms.      She has been taking her medications as prescribed and has been taking Metolazone twice weekly.  She has not been following her fluid restrictions and has been drinking more than 3 liters daily.      PAST MEDICAL HISTORY:  Past Medical History:   Diagnosis Date     A-fib (H) 2011    on coumadin since 1/13     Asthma     as a kid     Chest pain 2/1/2017     Chronic anticoagulation for a-fib 2/15/2013    INR's followed by coumadin clinic at      Diabetes mellitus (H) 2012     Diastolic heart failure 2/15/2013     Dilated cardiomyopathy (H) 1/8/2013     HTN (hypertension)      Hyperthyroidism     Graves, s/p I131 1/13, now on prednisone and methimazole     Morbid obesity (H)      Pulmonary embolism (H) 1/12    hospitalized in [place], on lovenox/coumadin for a few months but stopped, hypercoag w/u neg per pt     Sleep apnea     using CPAP       FAMILY HISTORY:  Family History   Problem Relation Age of Onset     Thyroid Disease Mother         Grave's D     Diabetes Mother      Heart Disease Mother      Cerebrovascular Disease Mother         dec. 56 yo     Hypertension Mother      Heart Disease Sister         Heart Murmur     Diabetes Sister      Heart Disease Father         dec in his 30s, MI     Psychotic Disorder Brother         Bipolar     Diabetes Brother      Thyroid Disease Brother         Hyper Thyroid     Heart Disease Brother      Thyroid Disease Sister          Hyper Thyroid     Thyroid Disease Sister         Hyper Thyroid     Thyroid Disease Sister         Mental Health Problems     Thyroid Disease Maternal Aunt      Thyroid Disease Maternal Uncle      Cancer No family hx of      Glaucoma No family hx of      Macular Degeneration No family hx of        SOCIAL HISTORY:  Social History     Socioeconomic History     Marital status: Single     Spouse name: None     Number of children: None     Years of education: None     Highest education level: None   Occupational History     None   Social Needs     Financial resource strain: None     Food insecurity:     Worry: None     Inability: None     Transportation needs:     Medical: None     Non-medical: None   Tobacco Use     Smoking status: Light Tobacco Smoker     Packs/day: 0.25     Years: 13.00     Pack years: 3.25     Types: Cigarettes     Smokeless tobacco: Never Used     Tobacco comment: down to 5 cigs   Substance and Sexual Activity     Alcohol use: No     Drug use: No     Sexual activity: Yes     Partners: Male     Birth control/protection: Condom   Lifestyle     Physical activity:     Days per week: None     Minutes per session: None     Stress: None   Relationships     Social connections:     Talks on phone: None     Gets together: None     Attends Bahai service: None     Active member of club or organization: None     Attends meetings of clubs or organizations: None     Relationship status: None     Intimate partner violence:     Fear of current or ex partner: None     Emotionally abused: None     Physically abused: None     Forced sexual activity: None   Other Topics Concern     Parent/sibling w/ CABG, MI or angioplasty before 65F 55M? Not Asked   Social History Narrative    Single, no children        Gyn:        Last pap several years ago, no abnormal    No STIs            Patient is single.  She is no longer working.  She used to work in the area of customer service.  She is currently living with her  sister in [place], [place].  She has no pets.  Patient has smoked a half pack of cigarettes a day for the past 10 plus years.  She states that she is down to 5 cigarettes a day with the aid of Wellbutrin.  She does not smoke cigars, pipes or chew tobacco.  She has 1 cup of coffee in the morning.  She does not drink alcohol.  Patient does not exercise.        CURRENT MEDICATIONS:  Current Outpatient Medications   Medication Sig Dispense Refill     acetaminophen (TYLENOL) 325 MG tablet Take 2 tablets (650 mg) by mouth 3 times daily as needed for mild pain or fever (total acetaminophen dose should not exceed 3000 mg per day) 180 tablet 5     albuterol (PROAIR HFA/PROVENTIL HFA/VENTOLIN HFA) 108 (90 Base) MCG/ACT inhaler Inhale 2 puffs into the lungs every 6 hours as needed for shortness of breath / dyspnea 75 g 0     bisacodyl (DULCOLAX) 10 MG suppository Place 1 suppository (10 mg) rectally daily as needed for constipation 6 suppository 0     blood glucose (ACCU-CHEK RON PLUS) test strip Use to test blood sugar 4  times daily or as directed. 360 each 3     blood glucose (NO BRAND SPECIFIED) lancets standard USE TO CHECK  blood sugar fasting each am  prelunch and predinner daily OR AS DIRECTED Call clinic to schedule MD APPT. 400 each 3     blood glucose (NO BRAND SPECIFIED) test strip Use to test blood sugars 3 times daily or as directed 400 strip 3     blood glucose monitoring (IBG STAR) meter device kit -PLEASE GIVE PATIENT A DEVICE HER INSURANCE WILL COVER- 1 kit 0     blood glucose monitoring (NO BRAND SPECIFIED) meter device kit Use to test blood sugar 3 times daily or as directed. 1 kit 1     buPROPion (WELLBUTRIN SR) 100 MG 12 hr tablet Take 1 tablet (100 mg) by mouth 2 times daily 180 tablet 2     capsaicin (ZOSTRIX) 0.025 % external cream Apply 1 g topically 3 times daily as needed 1 Tube 0     ciclopirox (LOPROX) 0.77 % cream Apply topically 2 times daily To feet and toenails. 90 g 6     ciclopirox  (PENLAC) 8 % external solution Apply topically daily To toenails. 6.6 mL 0     clotrimazole (LOTRIMIN) 1 % external cream Apply topically 2 times daily as needed (skin irritation) 60 g 1     colchicine (COLCYRS) 0.6 MG tablet Take 1-2 tablets (0.6-1.2 mg) by mouth daily as needed for moderate pain 180 tablet 0     diclofenac (VOLTAREN) 1 % topical gel Apply 4 grams to knees or 2 grams to hands four times daily using enclosed dosing card. 100 g 1     dulaglutide (TRULICITY) 1.5 MG/0.5ML pen Inject 1.5 mg Subcutaneous every 7 days 6 mL 0     DULoxetine (CYMBALTA) 20 MG capsule Take 1 capsule (20 mg) by mouth 2 times daily 60 capsule 0     Efinaconazole 10 % SOLN Externally apply topically daily To toenails. 8 mL 11     empagliflozin (JARDIANCE) 10 MG TABS tablet Take 1 tablet (10 mg) by mouth daily 90 tablet 3     gabapentin (NEURONTIN) 300 MG capsule TAKE TWO CAPSULES (600 MG) BY MOUTH TWICE A  capsule 0     hydrALAZINE (APRESOLINE) 25 MG tablet Take 50-75 mg by mouth Take 2 tabs (50 mg) in am, 2 tabs (50 mg) midday, and 3 tabs (75 mg) in pm daily  450 tablet 3     insulin glargine U-300 (TOUJEO) 300 UNIT/ML injection Inject 170 units SQ each am. 170 mL 1     insulin pen needle (BD RIVER U/F) 32G X 4 MM miscellaneous Use 6 daily or as directed 600 each 3     levothyroxine (SYNTHROID/LEVOTHROID) 200 MCG tablet TAKE ONE TABLET BY MOUTH EVERY DAY MONDAY THROUGH SATURDAY AND TAKE TWO TABLETS ON SUNDAYS 102 tablet 1     metolazone (ZAROXOLYN) 2.5 MG tablet Take 1 tablet (2.5 mg) by mouth 2 times daily Take 1/2 hour prior to torsemide 180 tablet 3     metoprolol succinate ER (TOPROL-XL) 200 MG 24 hr tablet Take 1 tablet (200 mg) by mouth daily 90 tablet 3     nicotine ([employer] NICOTINE) 14 MG/24HR patch 2h hr Place 1 patch onto the skin every 24 hours 30 patch 1     NOVOLOG FLEXPEN 100 UNIT/ML soln Inject 70 units with meals plus correction. Pt uses approx 200 units in 24 hrs. 180 mL 3     nystatin (MYCOSTATIN) 269136  UNIT/GM external cream Apply topically 2 times daily To toenails 90 g 6     order for DME Left foot 1 Units 0     order for DME Equipment being ordered: [id number] $92   Plantar, fasciitis, LG, night splint 1 each 0     order for DME Equipment being ordered: Challenger Wide walker if available - patient needs seat, basket and brakes. 1 each 0     ORDER FOR DME Use your CPAP device as directed by your provider. Pressure change to min 13 max 18cwp 1 each 99     oxyCODONE-acetaminophen (PERCOCET) 5-325 MG per tablet Take 1 tablet by mouth every 8 hours as needed for moderate to severe pain (try to limit use, no further prescriptions until seen in pain clinic) 60 tablet 0     polyethylene glycol (MIRALAX/GLYCOLAX) powder        potassium chloride ER (K-DUR/KLOR-CON M) 20 MEQ CR tablet Take 2 tablets (40 mEq) by mouth 4 times daily 240 tablet 11     Respiratory Therapy Supplies (NEBULIZER COMPRESSOR) KIT 1 Device 4 times daily as needed. 1 kit 3     senna (SENOKOT) 8.6 MG tablet Take 1 tablet by mouth 2 times daily 45 tablet 0     spironolactone (ALDACTONE) 50 MG tablet Take 1 tablet (50 mg) by mouth daily 90 tablet 1     tiotropium (SPIRIVA HANDIHALER) 18 MCG inhalation capsule Inhale contents of one capsule daily. 30 capsule 1     topiramate (TOPAMAX) 50 MG tablet Take 2 tablets (100 mg) by mouth daily Take 100 mg for 1 month and increase to 150 mg thereafter 270 tablet 3     torsemide (DEMADEX) 100 MG tablet Take 1 tablet (100 mg) by mouth 2 times daily 180 tablet 1     warfarin (COUMADIN) 5 MG tablet Take 20mg on F and 15mg on MTuWThSatSun, or as directed by the [facility] at [facility] 280 tablet 1     warfarin (COUMADIN) 5 MG tablet Take 20mgs on Fridays and 15mgs on all other days or as directed by the Coumadin Clinic 100 tablet 3     warfarin (COUMADIN) 5 MG tablet TAKE 3 TABLETS BY MOUTH ON MON, TUES, THURS, SAT, AND SUN AND 4 TABS ON WED AND FRI. 170 tablet 1     warfarin (COUMADIN) 5  "MG tablet [id number] on MTuThSatSun, and 20mg on WF, or as directed by the Med[facility] at the [facility] 170 tablet 1     morphine (MS CONTIN) 15 MG 12 hr tablet Take 15 mg by mouth daily as needed         ROS:  Constitutional: Denies fever, chills, or diaphoresis. See HPI   Respiratory:  See HPI.     Cardiovascular: As per HPI.   GI: Denies nausea, vomiting, diarrhea, hematemesis, melena, or hematochezia.   : See HPI  Integument: Negative for bruising, rash, or pruritis.  Psychiatric: Negative for anxiety, depression, sleep disturbance, or mood changes.   Neuro: Negative for headaches, syncope, numbness or tingling.   Endocrinology: +DM and excess thirst  Musculoskeletal: Negative for gout, joint swelling, or muscle weakness    EXAM:  /83 (BP Location: Left arm, Patient Position: Chair, Cuff Size: Adult Regular)   Pulse 76   Ht 1.702 m (5' 7\")   Wt (!) 229.5 kg (506 lb)   SpO2 97%   BMI 79.25 kg/m    General: alert, articulate, and in no acute distress.  HEENT: normocephalic, atraumatic, anicteric sclera, EOMI, mucosa moist, no cyanosis.    Neck: Supple.  JVP difficult to discern due to neck girth.   Heart: regular rhythm, normal S1/S2, no murmur, gallop, rub.  Precordium quiet with normal PMI.     Lungs: Clear, no rales, ronchi, or wheezing.  No accessory muscle use, respirations unlabored.   Abdomen: soft, non-tender, bowel sounds present, no hepatomegaly  Extremities: 2+ pitting edema.  No cyanosis.  Extremities warm to touch.  Neurological: Alert and oriented x 3.  Normal speech and affect, no gross motor deficits  Skin:  No ecchymoses, rashes, or clubbing.       Labs:  CBC RESULTS:  Lab Results   Component Value Date    WBC 5.0 03/18/2019    RBC 4.67 03/18/2019    HGB 13.5 03/18/2019    HCT 44.0 03/18/2019    MCV 94 03/18/2019    MCH 28.9 03/18/2019    MCHC 30.7 (L) 03/18/2019    RDW 14.0 03/18/2019     03/18/2019       CMP RESULTS:  Lab Results   Component Value Date     " 06/26/2019    POTASSIUM 4.1 06/26/2019    CHLORIDE 101 06/26/2019    CO2 31 06/26/2019    ANIONGAP 5 06/26/2019     (H) 06/26/2019    BUN 22 06/26/2019    CR 1.18 (H) 06/26/2019    GFRESTIMATED 56 (L) 06/26/2019    GFRESTBLACK 64 06/26/2019    JUSTIN 9.3 06/26/2019    BILITOTAL 0.2 10/25/2018    ALBUMIN 2.8 (L) 10/25/2018    ALKPHOS 89 10/25/2018    ALT 19 10/25/2018    AST 9 10/25/2018        INR RESULTS:  Lab Results   Component Value Date    INR 3.49 (H) 07/23/2019       No components found for: CK  Lab Results   Component Value Date    MAG 1.8 01/06/2015     Lab Results   Component Value Date    NTBNP 37 12/12/2017       Most recent echocardiogram 6/26/19:  Interpretation Summary  Technically difficult study.Extremely poor acoustic windows.  Limited information was obtained during study.  Left ventricular size is normal.  LVEF 43% based on biplane 2D tracing.  The inferior vena cava is normal.      Assessment and Plan:  In summary, this is a very pleasant 44 year old with HFpEF who appears hypervolemic due to dietary indiscretion.  I gave her Metolazone 5 mg today with 40 meq of potassium and changed her Bumex to 5 mg twice daily. She will contact us on Friday to update us on her symptoms.  If her symptoms aren't improved, I will discuss with her cardiologist about possible admission. She will return to [facility] in one week.    1. HFpEF:   Stage C  NYHA CLASS IIIb:  Symptoms with minimal activity, recent dyspnea at rest  BP: 130/83.  Continue Hydralazine and Toprol XL.  Fluid status hypervolemic  Aldosterone antagonist:  Spironolactone 50 mg daily  Ischemia evaluation: Previously evaluated.  No obstructive CAD  ACEi/ARB - N/a HFpEF  BB - Toprol  mg daily.   SCD prophylaxis - N/A, EF is normal   NSAID use: Contraindicated, reviewed with patient   Sleep Apnea Evaluation: JYOTI  Remote Monitoring:  None.    2.  Morbid obesity:  BMI 79.25.  Followed by weight loss clinic.    3.  PAF:  Chads Vasc score of 3:   Continue Toprol XL and Warfarin.    4. Follow-up:  [facility] in one week with labs.      >40 minutes spent face to face with patient with >50% in counseling, education, and coordination of care      Rajwinder RUST CNP  [facility]

## 2019-07-29 ENCOUNTER — PATIENT OUTREACH (OUTPATIENT)
Dept: CARDIOLOGY | Facility: CLINIC | Age: 44
End: 2019-07-29

## 2019-07-29 RX ORDER — CLOTRIMAZOLE 1 %
CREAM (GRAM) TOPICAL 2 TIMES DAILY PRN
Qty: 30 G | Refills: 0 | Status: ON HOLD | OUTPATIENT
Start: 2019-07-29 | End: 2023-01-01

## 2019-07-29 NOTE — PROGRESS NOTES
Left message for Pricilla to see how things were going since Bumex increase. Has [facility] appointment tomorrow    Shanice Ambrocio RN

## 2019-08-09 DIAGNOSIS — I50.32 CHRONIC DIASTOLIC HEART FAILURE (H): Primary | ICD-10-CM

## 2019-08-23 ENCOUNTER — TELEPHONE (OUTPATIENT)
Dept: INTERNAL MEDICINE | Facility: CLINIC | Age: 44
End: 2019-08-23

## 2019-08-23 NOTE — TELEPHONE ENCOUNTER
"Statin therapy is recommended for patients 40-75 years of age with a diagnosis of diabetes and low-dose aspirin therapy is recommended in those with diabetes and a history of atherosclerotic cardiovascular disease.  Does patient have a diagnosis of diabetes? Yes - Patient has a diagnosis of diabetes  Is patient prescribed a statin? No - Patient is NOT prescribed a statin  Statin Assessment: Statin Indicated  The 10-year ASCVD risk score (Mona OROZCO Jr., et al., 2013) is: 22.3%    Values used to calculate the score:      Age: 44 years      Sex: Female      Is Non- : Yes      Diabetic: Yes      Tobacco smoker: Yes      Systolic Blood Pressure: 140 mmHg      Is BP treated: Yes      HDL Cholesterol: 37 mg/dL      Total Cholesterol: 136 mg/dL   Statin Recommendation: Start statin  Reason for Recommendation: Diabetes diagnosis, 10-yr ASCVD risk score, and LDL     Is provider follow-up required? Yes - Provider follow-up is required  Is pharmacist follow-up required? No - Pharmacist follow-up is NOT required  Follow-Up Plan: Please consider adding a statin or please add \"Statin intentionally NOT prescribed\" to med [facility]  Patient outreach completed by:     NANDINI Painter, Pharm.D.  [facility]   [phone]    "

## 2019-09-03 DIAGNOSIS — Z79.4 TYPE 2 DIABETES MELLITUS WITH HYPERGLYCEMIA, WITH LONG-TERM CURRENT USE OF INSULIN (H): ICD-10-CM

## 2019-09-03 DIAGNOSIS — E11.65 TYPE 2 DIABETES MELLITUS WITH HYPERGLYCEMIA, WITH LONG-TERM CURRENT USE OF INSULIN (H): ICD-10-CM

## 2019-09-03 RX ORDER — GABAPENTIN 300 MG/1
CAPSULE ORAL
Qty: 360 CAPSULE | Refills: 0 | Status: SHIPPED | OUTPATIENT
Start: 2019-09-03 | End: 2019-12-06

## 2019-09-03 NOTE — TELEPHONE ENCOUNTER
Health Call Center    Phone Message    May a detailed message be left on voicemail: yes    Reason for Call: Medication Refill Request    Has the patient contacted the pharmacy for the refill? Yes   Name of medication being requested: gabapentin (NEURONTIN) 300 MG capsule   Provider who prescribed the medication: Isela Archibald  Pharmacy: [address] [place] [address] Pharmacy  Date medication is needed: today!---PT WILL BE COMING TO [address] [place] [address] TODAY, YET. Pt has other scripts that are ready to be picked up. Pt has great difficulty walking and she wanted to speak to Isela Archibald's nurse to let her know that she needs this script to be ready for p/u today, as well. Is that possible for this script to be ready? Pt is please asking for a phone call back to her asap. Thank you very much.      Action Taken: Message routed to:  [facility] ([facility] TING Diabetes

## 2019-09-11 ENCOUNTER — ANTICOAGULATION THERAPY VISIT (OUTPATIENT)
Dept: ANTICOAGULATION | Facility: CLINIC | Age: 44
End: 2019-09-11

## 2019-09-11 DIAGNOSIS — Z79.01 LONG TERM CURRENT USE OF ANTICOAGULANT THERAPY: ICD-10-CM

## 2019-09-17 DIAGNOSIS — I50.32 CHRONIC DIASTOLIC HEART FAILURE (H): Primary | ICD-10-CM

## 2019-09-24 ENCOUNTER — DOCUMENTATION ONLY (OUTPATIENT)
Dept: CARE COORDINATION | Facility: CLINIC | Age: 44
End: 2019-09-24

## 2019-10-04 ENCOUNTER — HEALTH MAINTENANCE LETTER (OUTPATIENT)
Age: 44
End: 2019-10-04

## 2019-10-23 NOTE — PROGRESS NOTES
Addendum 10/23/19  No INR done yet. Apt on 10/22 cancelled. Next scheduled visit is 10/29/19. If no show we need to call Isis Loyd [facility]

## 2019-10-29 ENCOUNTER — OFFICE VISIT (OUTPATIENT)
Dept: CARDIOLOGY | Facility: CLINIC | Age: 44
End: 2019-10-29
Attending: INTERNAL MEDICINE
Payer: MEDICARE

## 2019-10-29 VITALS
OXYGEN SATURATION: 100 % | BODY MASS INDEX: 45.99 KG/M2 | HEART RATE: 94 BPM | SYSTOLIC BLOOD PRESSURE: 134 MMHG | WEIGHT: 293 LBS | HEIGHT: 67 IN | DIASTOLIC BLOOD PRESSURE: 86 MMHG

## 2019-10-29 DIAGNOSIS — I48.0 PAROXYSMAL ATRIAL FIBRILLATION (H): ICD-10-CM

## 2019-10-29 DIAGNOSIS — I50.32 CHRONIC DIASTOLIC HEART FAILURE (H): ICD-10-CM

## 2019-10-29 DIAGNOSIS — Z72.0 TOBACCO ABUSE: ICD-10-CM

## 2019-10-29 DIAGNOSIS — M79.2 NEUROPATHIC PAIN OF LOWER EXTREMITY, UNSPECIFIED LATERALITY: ICD-10-CM

## 2019-10-29 DIAGNOSIS — M10.9 ACUTE GOUTY ARTHRITIS: ICD-10-CM

## 2019-10-29 DIAGNOSIS — I50.23 ACUTE ON CHRONIC SYSTOLIC HEART FAILURE (H): Primary | ICD-10-CM

## 2019-10-29 DIAGNOSIS — I50.23 ACUTE ON CHRONIC SYSTOLIC HEART FAILURE (H): ICD-10-CM

## 2019-10-29 LAB
ALBUMIN SERPL-MCNC: 2.9 G/DL (ref 3.4–5)
ALP SERPL-CCNC: 78 U/L (ref 40–150)
ALT SERPL W P-5'-P-CCNC: 21 U/L (ref 0–50)
ANION GAP SERPL CALCULATED.3IONS-SCNC: 6 MMOL/L (ref 3–14)
AST SERPL W P-5'-P-CCNC: 9 U/L (ref 0–45)
BILIRUB SERPL-MCNC: 0.3 MG/DL (ref 0.2–1.3)
BUN SERPL-MCNC: 14 MG/DL (ref 7–30)
CALCIUM SERPL-MCNC: 8.4 MG/DL (ref 8.5–10.1)
CHLORIDE SERPL-SCNC: 102 MMOL/L (ref 94–109)
CO2 SERPL-SCNC: 30 MMOL/L (ref 20–32)
CREAT SERPL-MCNC: 1.25 MG/DL (ref 0.52–1.04)
ERYTHROCYTE [DISTWIDTH] IN BLOOD BY AUTOMATED COUNT: 15.1 % (ref 10–15)
GFR SERPL CREATININE-BSD FRML MDRD: 52 ML/MIN/{1.73_M2}
GLUCOSE SERPL-MCNC: 180 MG/DL (ref 70–99)
HCT VFR BLD AUTO: 45.2 % (ref 35–47)
HGB BLD-MCNC: 14.1 G/DL (ref 11.7–15.7)
INR PPP: 1.94 (ref 0.86–1.14)
MCH RBC QN AUTO: 29.5 PG (ref 26.5–33)
MCHC RBC AUTO-ENTMCNC: 31.2 G/DL (ref 31.5–36.5)
MCV RBC AUTO: 95 FL (ref 78–100)
NT-PROBNP SERPL-MCNC: 34 PG/ML (ref 0–125)
PLATELET # BLD AUTO: 284 10E9/L (ref 150–450)
POTASSIUM SERPL-SCNC: 3.6 MMOL/L (ref 3.4–5.3)
PROT SERPL-MCNC: 8.1 G/DL (ref 6.8–8.8)
RBC # BLD AUTO: 4.78 10E12/L (ref 3.8–5.2)
SODIUM SERPL-SCNC: 138 MMOL/L (ref 133–144)
URATE SERPL-MCNC: 9.2 MG/DL (ref 2.6–6)
WBC # BLD AUTO: 10.6 10E9/L (ref 4–11)

## 2019-10-29 PROCEDURE — G0463 HOSPITAL OUTPT CLINIC VISIT: HCPCS

## 2019-10-29 PROCEDURE — 83880 ASSAY OF NATRIURETIC PEPTIDE: CPT | Performed by: INTERNAL MEDICINE

## 2019-10-29 PROCEDURE — 80053 COMPREHEN METABOLIC PANEL: CPT | Performed by: INTERNAL MEDICINE

## 2019-10-29 PROCEDURE — 85027 COMPLETE CBC AUTOMATED: CPT | Performed by: INTERNAL MEDICINE

## 2019-10-29 PROCEDURE — 85610 PROTHROMBIN TIME: CPT | Performed by: INTERNAL MEDICINE

## 2019-10-29 PROCEDURE — 36415 COLL VENOUS BLD VENIPUNCTURE: CPT | Performed by: INTERNAL MEDICINE

## 2019-10-29 PROCEDURE — 99214 OFFICE O/P EST MOD 30 MIN: CPT | Mod: ZP | Performed by: NURSE PRACTITIONER

## 2019-10-29 RX ORDER — ISOSORBIDE DINITRATE 20 MG/1
20 TABLET ORAL 3 TIMES DAILY
Qty: 180 TABLET | Refills: 3 | Status: SHIPPED | OUTPATIENT
Start: 2019-10-29 | End: 2020-04-02

## 2019-10-29 ASSESSMENT — ENCOUNTER SYMPTOMS
SPUTUM PRODUCTION: 1
MUSCLE CRAMPS: 0
WEIGHT LOSS: 0
HALLUCINATIONS: 0
BRUISES/BLEEDS EASILY: 0
DYSURIA: 0
NAUSEA: 0
NIGHT SWEATS: 0
LEG PAIN: 1
DIZZINESS: 1
EYE IRRITATION: 1
RECTAL PAIN: 0
ORTHOPNEA: 1
BACK PAIN: 1
POLYDIPSIA: 1
SNORES LOUDLY: 1
HEARTBURN: 0
DOUBLE VISION: 1
STIFFNESS: 1
FEVER: 0
EXERCISE INTOLERANCE: 1
JAUNDICE: 0
CHILLS: 1
LIGHT-HEADEDNESS: 0
INCREASED ENERGY: 1
CONSTIPATION: 1
HEMOPTYSIS: 0
MUSCLE WEAKNESS: 1
ARTHRALGIAS: 0
DISTURBANCES IN COORDINATION: 1
HYPERTENSION: 1
MYALGIAS: 1
DYSPNEA ON EXERTION: 1
SHORTNESS OF BREATH: 1
NECK PAIN: 1
EYE WATERING: 1
BLOATING: 1
VOMITING: 0
ABDOMINAL PAIN: 0
BOWEL INCONTINENCE: 0
EYE REDNESS: 1
HYPOTENSION: 0
BLOOD IN STOOL: 1
EYE PAIN: 0
DIARRHEA: 0
WHEEZING: 1
JOINT SWELLING: 1
COUGH: 1
SYNCOPE: 0
ALTERED TEMPERATURE REGULATION: 1
DECREASED APPETITE: 0
COUGH DISTURBING SLEEP: 0
POSTURAL DYSPNEA: 1
POLYPHAGIA: 1
WEIGHT GAIN: 1
SWOLLEN GLANDS: 0
SLEEP DISTURBANCES DUE TO BREATHING: 1
PALPITATIONS: 1
FATIGUE: 1

## 2019-10-29 ASSESSMENT — PATIENT HEALTH QUESTIONNAIRE - PHQ9: SUM OF ALL RESPONSES TO PHQ QUESTIONS 1-9: 6

## 2019-10-29 ASSESSMENT — MIFFLIN-ST. JEOR: SCORE: 2944.25

## 2019-10-29 ASSESSMENT — PAIN SCALES - GENERAL: PAINLEVEL: NO PAIN (0)

## 2019-10-29 NOTE — PATIENT INSTRUCTIONS
Take your medicines every day, as directed    Changes made today:  o Isosorbide 10mg three times day   o Lay down 1/2 hour after you take your water pill   o Use Ace wraps on legs - on in the am; off in the pm.   Monitor Your Weight and Symptoms    Contact us if you:      Gain 2 pounds in one day or 5 pounds in one week    Feel more short of breath    Notice more leg swelling    Feel lightheadeded   Change your lifestyle    Limit Salt or Sodium:    2000 mg  Limit Fluids:    2000 mL or approximately 64 ounces  Eat a Heart Healthy Diet    Low in saturated fats  Stay Active:    Aim to move at least 150 minutes every  week         To Contact us    During Business Hours:  [phone], option # 1 (University)  Then option # 4 (medical questions)     After hours, weekends or holidays:   [phone], Option #4  Ask to speak to the On-Call Cardiologist. Inform them you are a CORE/heart failure patient at the [facility].     Use [employer] Live Chat allows you to communicate directly with your heart team through secure messaging.    [employer] can be accessed any time on your phone, computer, or tablet.    If you need assistance, we'd be happy to help!         Keep your Heart Appointments:    3 months in [facility]

## 2019-10-29 NOTE — NURSING NOTE
Chief Complaint   Patient presents with     Follow Up     Return [facility]; 44yr old female with a h/o chronic systolic heart failure presenting for follow-up with labs prior     Vitals were taken and medications were reconciled.     Elaine Mckeon  5:24 PM

## 2019-10-29 NOTE — PROGRESS NOTES
HPI: 44 yr old female presents to [facility] for follow up of NICM with an EF of 43%. She was last seen by Rajwinder Heaton NP on 7/23/19, She has missed several follow up apt's since that time. She states she feels SOB with minimal activity and that she feels like she is retaining water. Her sleep varies from sleeping in a chair to lying flat without difficulty. She does wear her CPAP.  She hasn't been using metolazone as she ran out of this medication. Her appetite is fair. She does complain of early satiety. She has been watching her salt intake.  She reports her blood glucose has been better - with very few readings greater than 200,.     PMH: morbid obesity  PAF - on Warfarin  DM2   Tobacco use ongoing      PAST MEDICAL HISTORY:  Past Medical History:   Diagnosis Date     A-fib (H) 2011    on coumadin since 1/13     Asthma     as a kid     Chest pain 2/1/2017     Chronic anticoagulation for a-fib 2/15/2013    INR's followed by coumadin clinic at      Diabetes mellitus (H) 2012     Diastolic heart failure 2/15/2013     Dilated cardiomyopathy (H) 1/8/2013     HTN (hypertension)      Hyperthyroidism     Graves, s/p I131 1/13, now on prednisone and methimazole     Morbid obesity (H)      Pulmonary embolism (H) 1/12    hospitalized in [place], on lovenox/coumadin for a few months but stopped, hypercoag w/u neg per pt     Sleep apnea     using CPAP       FAMILY HISTORY:  Family History   Problem Relation Age of Onset     Thyroid Disease Mother         Grave's D     Diabetes Mother      Heart Disease Mother      Cerebrovascular Disease Mother         dec. 56 yo     Hypertension Mother      Heart Disease Sister         Heart Murmur     Diabetes Sister      Heart Disease Father         dec in his 30s, MI     Psychotic Disorder Brother         Bipolar     Diabetes Brother      Thyroid Disease Brother         Hyper Thyroid     Heart Disease Brother      Thyroid Disease Sister         Hyper Thyroid     Thyroid Disease Sister          Hyper Thyroid     Thyroid Disease Sister         Mental Health Problems     Thyroid Disease Maternal Aunt      Thyroid Disease Maternal Uncle      Cancer No family hx of      Glaucoma No family hx of      Macular Degeneration No family hx of        SOCIAL HISTORY:  Social History     Socioeconomic History     Marital status: Single     Spouse name: Not on file     Number of children: Not on file     Years of education: Not on file     Highest education level: Not on file   Occupational History     Not on file   Social Needs     Financial resource strain: Not on file     Food insecurity:     Worry: Not on file     Inability: Not on file     Transportation needs:     Medical: Not on file     Non-medical: Not on file   Tobacco Use     Smoking status: Light Tobacco Smoker     Packs/day: 0.25     Years: 13.00     Pack years: 3.25     Types: Cigarettes     Smokeless tobacco: Never Used     Tobacco comment: down to 5 cigs   Substance and Sexual Activity     Alcohol use: No     Drug use: No     Sexual activity: Yes     Partners: Male     Birth control/protection: Condom   Lifestyle     Physical activity:     Days per week: Not on file     Minutes per session: Not on file     Stress: Not on file   Relationships     Social connections:     Talks on phone: Not on file     Gets together: Not on file     Attends Islam service: Not on file     Active member of club or organization: Not on file     Attends meetings of clubs or organizations: Not on file     Relationship status: Not on file     Intimate partner violence:     Fear of current or ex partner: Not on file     Emotionally abused: Not on file     Physically abused: Not on file     Forced sexual activity: Not on file   Other Topics Concern     Parent/sibling w/ CABG, MI or angioplasty before 65F 55M? Not Asked   Social History Narrative    Single, no children        Gyn:        Last pap several years ago, no abnormal    No STIs            Patient is single.   She is no longer working.  She used to work in the area of customer service.  She is currently living with her sister in [place], [place].  She has no pets.  Patient has smoked a half pack of cigarettes a day for the past 10 plus years.  She states that she is down to 5 cigarettes a day with the aid of Wellbutrin.  She does not smoke cigars, pipes or chew tobacco.  She has 1 cup of coffee in the morning.  She does not drink alcohol.  Patient does not exercise.        CURRENT MEDICATIONS:  Current Outpatient Medications   Medication Sig Dispense Refill     acetaminophen (TYLENOL) 325 MG tablet Take 2 tablets (650 mg) by mouth 3 times daily as needed for mild pain or fever (total acetaminophen dose should not exceed 3000 mg per day) 180 tablet 0     albuterol (PROAIR HFA/PROVENTIL HFA/VENTOLIN HFA) 108 (90 Base) MCG/ACT inhaler Inhale 2 puffs into the lungs every 6 hours as needed for shortness of breath / dyspnea 75 g 0     bisacodyl (DULCOLAX) 10 MG suppository Place 1 suppository (10 mg) rectally daily as needed for constipation 6 suppository 0     blood glucose (ACCU-CHEK RON PLUS) test strip Use to test blood sugar 4  times daily or as directed. 360 each 3     blood glucose (NO BRAND SPECIFIED) lancets standard USE TO CHECK  blood sugar fasting each am  prelunch and predinner daily OR AS DIRECTED Call clinic to schedule MD APPT. 400 each 3     blood glucose (NO BRAND SPECIFIED) test strip Use to test blood sugars 3 times daily or as directed 400 strip 3     blood glucose monitoring (IBG STAR) meter device kit -PLEASE GIVE PATIENT A DEVICE HER INSURANCE WILL COVER- 1 kit 0     blood glucose monitoring (NO BRAND SPECIFIED) meter device kit Use to test blood sugar 3 times daily or as directed. 1 kit 1     bumetanide (BUMEX) 1 MG tablet Take 5 tablets (5 mg) by mouth 2 times daily 300 tablet 11     buPROPion (WELLBUTRIN SR) 100 MG 12 hr tablet Take 1 tablet (100 mg) by mouth 2 times daily 180 tablet 0      capsaicin (ZOSTRIX) 0.025 % external cream Apply 1 g topically 3 times daily as needed 1 Tube 0     ciclopirox (LOPROX) 0.77 % cream Apply topically 2 times daily To feet and toenails. 90 g 6     ciclopirox (PENLAC) 8 % external solution Apply topically daily To toenails. 6.6 mL 0     clotrimazole (LOTRIMIN) 1 % external cream Apply topically 2 times daily as needed (skin irritation) 30 g 0     colchicine (COLCYRS) 0.6 MG tablet Take 1-2 tablets (0.6-1.2 mg) by mouth daily as needed for moderate pain 180 tablet 0     diclofenac (VOLTAREN) 1 % topical gel Apply 4 grams to knees or 2 grams to hands four times daily using enclosed dosing card. 100 g 1     dulaglutide (TRULICITY) 1.5 MG/0.5ML pen Inject 1.5 mg Subcutaneous every 7 days 6 mL 3     DULoxetine (CYMBALTA) 20 MG capsule Take 1 capsule (20 mg) by mouth 2 times daily 60 capsule 0     Efinaconazole 10 % SOLN Externally apply topically daily To toenails. 8 mL 11     empagliflozin (JARDIANCE) 25 MG TABS tablet Take 1 tablet (25 mg) by mouth daily 90 tablet 3     gabapentin (NEURONTIN) 300 MG capsule TAKE TWO CAPSULES BY MOUTH TWICE A  capsule 0     hydrALAZINE (APRESOLINE) 25 MG tablet Take 50-75 mg by mouth Take 2 tabs (50 mg) in am, 2 tabs (50 mg) midday, and 3 tabs (75 mg) in pm daily  450 tablet 3     insulin glargine U-300 (TOUJEO) 300 UNIT/ML injection Inject 170 units SQ each am. 170 mL 3     insulin pen needle (BD RIVER U/F) 32G X 4 MM miscellaneous Use 6 daily or as directed 600 each 3     levothyroxine (SYNTHROID/LEVOTHROID) 200 MCG tablet TAKE ONE TABLET BY MOUTH EVERY DAY MONDAY THROUGH SATURDAY AND TAKE TWO TABLETS ON SUNDAYS 102 tablet 1     metolazone (ZAROXOLYN) 2.5 MG tablet Take 1 tablet (2.5 mg) by mouth 2 times daily Take 1/2 hour prior to torsemide 180 tablet 3     metoprolol succinate ER (TOPROL-XL) 200 MG 24 hr tablet Take 1 tablet (200 mg) by mouth daily 90 tablet 3     morphine (MS CONTIN) 15 MG 12 hr tablet Take 15 mg by mouth  daily as needed       nicotine ([employer] NICOTINE) 14 MG/24HR 24 hr patch Place 1 patch onto the skin every 24 hours 30 patch 0     NOVOLOG FLEXPEN 100 UNIT/ML soln Inject 70 units with meals plus correction. Pt uses approx 200 units in 24 hrs. 180 mL 3     nystatin (MYCOSTATIN) 138532 UNIT/GM external cream Apply topically 2 times daily To toenails 90 g 6     order for DME Left foot 1 Units 0     order for DME Equipment being ordered: [id number] $92   Plantar, fasciitis, LG, night splint 1 each 0     order for DME Equipment being ordered: Challenger Wide walker if available - patient needs seat, basket and brakes. 1 each 0     ORDER FOR DME Use your CPAP device as directed by your provider. Pressure change to min 13 max 18cwp 1 each 99     oxyCODONE-acetaminophen (PERCOCET) 5-325 MG per tablet Take 1 tablet by mouth every 8 hours as needed for moderate to severe pain (try to limit use, no further prescriptions until seen in pain clinic) 60 tablet 0     polyethylene glycol (MIRALAX/GLYCOLAX) powder        potassium chloride ER (K-DUR/KLOR-CON M) 20 MEQ CR tablet Take 2 tablets (40 mEq) by mouth 4 times daily 240 tablet 11     Respiratory Therapy Supplies (NEBULIZER COMPRESSOR) KIT 1 Device 4 times daily as needed. 1 kit 3     senna (SENOKOT) 8.6 MG tablet Take 1 tablet by mouth 2 times daily 45 tablet 0     spironolactone (ALDACTONE) 50 MG tablet Take 1 tablet (50 mg) by mouth daily 30 tablet 3     tiotropium (SPIRIVA HANDIHALER) 18 MCG inhalation capsule Inhale contents of one capsule daily. 30 capsule 1     topiramate (TOPAMAX) 50 MG tablet Take 3 tablets (150 mg) by mouth daily 90 tablet 1     warfarin (COUMADIN) 5 MG tablet Take 20mg on F and 15mg on MTuWThSatSun, or as directed by the Medication Monitoring Clinic at the  of [facility]. 280 tablet 1     warfarin (COUMADIN) 5 MG tablet Take 20mgs on Fridays and 15mgs on all other days or as directed by the Coumadin Clinic 100 tablet 3     warfarin (COUMADIN) 5 MG  "tablet TAKE 3 TABLETS BY MOUTH ON MON, TUES, THURS, SAT, AND SUN AND 4 TABS ON WED AND FRI. 170 tablet 1     warfarin (COUMADIN) 5 MG tablet [id number] on MTuThSatSun, and 20mg on WF, or as directed by the [facility] at the [facility] . 170 tablet 1       ROS:   Constitutional: No fever, chills, or sweats. No weight gain/loss.   ENT: No visual disturbance, ear ache, epistaxis, sore throat.   Allergies/Immunologic: Negative.   Respiratory: No cough, hemoptysis.   Cardiovascular: As per HPI.   GI: No nausea, vomiting, hematemesis, melena, or hematochezia.   : No urinary frequency, dysuria, or hematuria.   Integument: Negative.   Psychiatric: Negative.   Neuro: Negative.   Endocrinology: Negative.   Musculoskeletal: Negative.    EXAM:  /86 (BP Location: Left arm, Patient Position: Chair, Cuff Size: Adult Large)   Pulse 94   Ht 1.702 m (5' 7\")   Wt (!) 226.7 kg (499 lb 11.2 oz)   SpO2 100%   BMI 78.26 kg/m      General: appears comfortable, alert and articulate; morbidly obese black female  Head: normocephalic, atraumatic  Eyes: anicteric sclera, EOMI  Neck: no adenopathy  Orophyarynx: moist mucosa, no lesions, dentition intact  Heart: regular, S1/S2, no murmur, gallop, rub, estimated JVP unable to assess due to body habitus  Lungs: clear, no rales or wheezing- decreased breath sounds throughout  Abdomen: soft, non-tender, bowel sounds present, no hepatosplenomegaly  Extremities: no clubbing, cyanosis or pitting edema  Neurological: normal speech and affect, no gross motor deficits    Labs:  CBC RESULTS:  Lab Results   Component Value Date    WBC 5.0 03/18/2019    RBC 4.67 03/18/2019    HGB 13.5 03/18/2019    HCT 44.0 03/18/2019    MCV 94 03/18/2019    MCH 28.9 03/18/2019    MCHC 30.7 (L) 03/18/2019    RDW 14.0 03/18/2019     03/18/2019       CMP RESULTS:  Lab Results   Component Value Date     10/29/2019    POTASSIUM 3.6 10/29/2019    CHLORIDE 102 10/29/2019    CO2 30 10/29/2019    " ANIONGAP 6 10/29/2019     (H) 10/29/2019    BUN 14 10/29/2019    CR 1.25 (H) 10/29/2019    GFRESTIMATED 52 (L) 10/29/2019    GFRESTBLACK 60 (L) 10/29/2019    JUSTIN 8.4 (L) 10/29/2019    BILITOTAL 0.3 10/29/2019    ALBUMIN 2.9 (L) 10/29/2019    ALKPHOS 78 10/29/2019    ALT 21 10/29/2019    AST 9 10/29/2019        INR RESULTS:  Lab Results   Component Value Date    INR 3.49 (H) 07/23/2019       Lab Results   Component Value Date    MAG 1.8 01/06/2015     Lab Results   Component Value Date    NTBNPI 30 02/23/2015     Lab Results   Component Value Date    NTBNP 268 (H) 07/23/2019       Assessment and Plan:   1. Chronic systolic heart failure secondary to NICM.    Stage C  NYHA Class III  ACEi/ARB yes - on hydralazine - will add isosorbide today   BB yes  Aldosterone antagonist yes  SCD prophylaxis does not meet criteria for implant  % BiV pacing: N/A  Fluid status hypervolemic but patient is hard to assess  NSAID use: no  Sleep Apnea Evaluation: uses CPAP    2. DM2 - improved control per pt report. Managed by PCP    3. Afib: rate controlled on warfarin- toprol XL    4. Ongoing tobacco use - pt has cut back on tobacco use to 1-2 cigs/day.    5. HTN: well controlled on current regime    6. CKD: stable renal function - suspect fluctuations in CR due to volume status.   Follow-up 3 months.     CC  Patient Care Team:  Jamilah Bernal MD as PCP - General (Internal Medicine)  Percy Alcala MD as MD (Cardiology)  Silva Damon MD as MD (Internal Medicine)  Norman Jean DPM (Podiatry)  Steph Kim PA-C as Physician Assistant (Physician Assistant)  Delmi Orellana MD as MD (Ophthalmology)  Nelly Miranda MD as MD (Ophthalmology)  Drea Rosas, RN as Clinic Care Coordinator (Bariatric)  Isela Archibald PA-C as Physician Assistant (Physician Assistant)  Tom Guardado MD as MD (Ophthalmology)  Russel Vergara OD as MD (Optometry)  Efraín,  TA Jacinto as Nurse Coordinator (Cardiology)  Kristen Cuello APRN CNP as Nurse Practitioner (Cardiology)  Ximena Carpenter NP as Nurse Practitioner (Cardiology)  Ximena Carpenter NP as Nurse Practitioner (Nurse Practitioner - Family)  Lauren Resendez [facility] as Pharmacist (Pharmacist Clinician- Clinical Pharmacy Specialist)  Alexandru Wang RN as Specialty Care Coordinator (Cardiology)  Shanice Ambrocio RN as Registered Nurse (Cardiology)  Rajwinder Heaton NP as Nurse Practitioner (Nurse Practitioner - Gerontology)  Isela Archibald PA-C as Physician Assistant (Physician Assistant)  SELF, REFERRED    Answers for HPI/ROS submitted by the patient on 10/29/2019   General Symptoms: Yes  Skin Symptoms: No  HENT Symptoms: No  EYE SYMPTOMS: Yes  HEART SYMPTOMS: Yes  LUNG SYMPTOMS: Yes  INTESTINAL SYMPTOMS: Yes  URINARY SYMPTOMS: Yes  GYNECOLOGIC SYMPTOMS: No  BREAST SYMPTOMS: No  SKELETAL SYMPTOMS: Yes  BLOOD SYMPTOMS: Yes  NERVOUS SYSTEM SYMPTOMS: Yes  MENTAL HEALTH SYMPTOMS: No  Fever: No  Loss of appetite: No  Weight loss: No  Weight gain: Yes  Fatigue: Yes  Night sweats: No  Chills: Yes  Increased stress: Yes  Excessive hunger: Yes  Excessive thirst: Yes  Feeling hot or cold when others believe the temperature is normal: Yes  Loss of height: No  Post-operative complications: No  Surgical site pain: No  Hallucinations: No  Change in or Loss of Energy: Yes  Hyperactivity: No  Confusion: No  Eye pain: No  Vision loss: No  Dry eyes: No  Watery eyes: Yes  Eye bulging: Yes  Double vision: Yes  Flashing of lights: No  Spots: No  Floaters: No  Redness: Yes  Crossed eyes: No  Tunnel Vision: No  Yellowing of eyes: No  Eye irritation: Yes  Cough: Yes  Sputum or phlegm: Yes  Coughing up blood: No  Difficulty breating or shortness of breath: Yes  Snoring: Yes  Wheezing: Yes  Difficulty breathing on exertion: Yes  Nighttime Cough: No  Difficulty breathing when lying flat: Yes  Chest pain or pressure:  Yes  Fast or irregular heartbeat: Yes  Pain in legs with walking: Yes  Trouble breathing while lying down: Yes  Fingers or toes appear blue: No  High blood pressure: Yes  Low blood pressure: No  Fainting: No  Murmurs: No  Pacemaker: No  Varicose veins: No  Edema or swelling: Yes  Wake up at night with shortness of breath: Yes  Light-headedness: No  Exercise intolerance: Yes  Heart burn or indigestion: No  Nausea: No  Vomiting: No  Abdominal pain: No  Bloating: Yes  Constipation: Yes  Diarrhea: No  Blood in stool: Yes  Black stools: No  Rectal or Anal pain: No  Fecal incontinence: No  Yellowing of skin or eyes: No  Vomit with blood: No  Change in stools: No  Trouble holding urine or incontinence: Yes  Pain or burning: No  Back pain: Yes  Muscle aches: Yes  Neck pain: Yes  Swollen joints: Yes  Joint pain: No  Bone pain: No  Muscle cramps: No  Muscle weakness: Yes  Joint stiffness: Yes  Bone fracture: No  Anemia: No  Swollen glands: No  Easy bleeding or bruising: No  Trouble with coordination: Yes  Dizziness or trouble with balance: Yes

## 2019-10-30 ENCOUNTER — ANTICOAGULATION THERAPY VISIT (OUTPATIENT)
Dept: ANTICOAGULATION | Facility: CLINIC | Age: 44
End: 2019-10-30

## 2019-10-30 DIAGNOSIS — I48.91 ATRIAL FIBRILLATION (H): ICD-10-CM

## 2019-10-30 DIAGNOSIS — Z79.01 LONG TERM CURRENT USE OF ANTICOAGULANT THERAPY: ICD-10-CM

## 2019-10-30 RX ORDER — NICOTINE 21 MG/24HR
1 PATCH, TRANSDERMAL 24 HOURS TRANSDERMAL EVERY 24 HOURS
Qty: 90 PATCH | Refills: 0 | Status: ON HOLD | OUTPATIENT
Start: 2019-10-30 | End: 2023-01-01

## 2019-10-30 NOTE — PROGRESS NOTES
ANTICOAGULATION FOLLOW-UP CLINIC VISIT    Patient Name:  Pricilla Brown  Date:  10/30/2019  Contact Type:  Telephone    SUBJECTIVE:         OBJECTIVE    INR   Date Value Ref Range Status   10/29/2019 1.94 (H) 0.86 - 1.14 Final     Chromogenic Factor 10   Date Value Ref Range Status   2017 19 (L) 70 - 130 % Final     Comment:     Therapeutic Range:  A Chromogenic Factor 10 level of approximately 20-40%   inversely correlates with an INR of 2-3 for patients receiving Warfarin.   Chromogenic Factor 10 levels below 20% indicate an INR greater than 3 and   levels above 40% indicate an INR less than 2.         ASSESSMENT / PLAN  No question data found.  Anticoagulation Summary  As of 10/30/2019    INR goal:   2.0-2.5   TTR:   38.9 % (3.3 y)   INR used for dosin.94! (10/29/2019)   Warfarin maintenance plan:   20 mg (5 mg x 4) every Fri; 15 mg (5 mg x 3) all other days   Full warfarin instructions:   20 mg every Fri; 15 mg all other days   Weekly warfarin total:   110 mg   No change documented:   Alecia Jacobson RN   Plan last modified:   Alecia Jacobson RN (10/24/2018)   Next INR check:   2019   Priority:   INR   Target end date:   Indefinite    Indications    Atrial fibrillation (H) [I48.91] [I48.91]  Long-term (current) use of anticoagulants [Z79.01] [Z79.01]             Anticoagulation Episode Summary     INR check location:   Clinic Lab    Preferred lab:       Send INR reminders to:   [facility]    Comments:   Contact Patient at [phone]      Anticoagulation Care Providers     Provider Role Specialty Phone number    Percy Alcala MD  Cardiology [phone]            See the Encounter Report to view Anticoagulation Flowsheet and Dosing Calendar (Go to Encounters tab in chart review, and find the Anticoagulation Therapy Visit)    Left message for patient with results and dosing recommendations. Asked patient to call back to report any missed doses, falls, signs and symptoms  of bleeding or clotting, any changes in health, medication, or diet. Asked patient to call back with any questions or concerns.     Alecia Jacobson RN

## 2019-10-30 NOTE — TELEPHONE ENCOUNTER
NICOTINE 14MG/24HR PT24  Last Written Prescription Date:  7/24/2019  Last Fill Quantity: 30,   # refills: 0  Last Office Visit : 9/20/2018  Future Office visit:  12/19/2019  Pt scheduled to see PCP on  12/19/2019.   Sent 90 day supply to cover Pt until seen in clinic for Pt care.       Ashley Haynes RN  Central Triage Red Flags/Med Refills

## 2019-11-02 ENCOUNTER — HEALTH MAINTENANCE LETTER (OUTPATIENT)
Age: 44
End: 2019-11-02

## 2019-12-06 DIAGNOSIS — Z79.4 TYPE 2 DIABETES MELLITUS WITH HYPERGLYCEMIA, WITH LONG-TERM CURRENT USE OF INSULIN (H): ICD-10-CM

## 2019-12-06 DIAGNOSIS — E11.65 TYPE 2 DIABETES MELLITUS WITH HYPERGLYCEMIA, WITH LONG-TERM CURRENT USE OF INSULIN (H): ICD-10-CM

## 2019-12-08 NOTE — TELEPHONE ENCOUNTER
gabapentin (NEURONTIN) 300 MG capsule  Last Written Prescription Date:  9/3/19  Last Fill Quantity: 360,   # refills: 0  Last Office Visit : 7/24/19  Future Office visit:  none    Routing refill request to provider for review/approval because: not on protocol

## 2019-12-09 RX ORDER — GABAPENTIN 300 MG/1
CAPSULE ORAL
Qty: 360 CAPSULE | Refills: 0 | Status: SHIPPED | OUTPATIENT
Start: 2019-12-09 | End: 2020-05-08

## 2019-12-11 ENCOUNTER — PATIENT OUTREACH (OUTPATIENT)
Dept: CARDIOLOGY | Facility: CLINIC | Age: 44
End: 2019-12-11

## 2019-12-11 DIAGNOSIS — I50.23 ACUTE ON CHRONIC SYSTOLIC HEART FAILURE (H): Primary | ICD-10-CM

## 2019-12-11 DIAGNOSIS — I42.0 DILATED CARDIOMYOPATHY (H): ICD-10-CM

## 2019-12-21 DIAGNOSIS — Z72.0 TOBACCO ABUSE: ICD-10-CM

## 2019-12-24 DIAGNOSIS — E11.42 TYPE 2 DIABETES MELLITUS WITH DIABETIC POLYNEUROPATHY, WITHOUT LONG-TERM CURRENT USE OF INSULIN (H): Primary | ICD-10-CM

## 2019-12-24 NOTE — TELEPHONE ENCOUNTER
insulin glargine U-300 (TOUJEO) 300 UNIT/ML injection        Last Written Prescription Date:  07/24/19  Last Fill Quantity: 170mL,   # refills: 3  Last Office Visit : 07/24/19  Future Office visit:  None scheduled    Routing refill request to provider for review/approval because:  Insulin - refilled per clinic

## 2019-12-24 NOTE — TELEPHONE ENCOUNTER
NICOTINE 14MG/24HR PT24     Last Written Prescription Date:  10/30/19  Last Fill Quantity: 90,   # refills: 0  Last Office Visit : 5/31/17 with Griselda Jean Baptiste  Future Office visit:  1/27/20, no showed 9/25/19, cancelled 12/19/19 Ok to fill again or wait until appointment kept    Routing refill request to provider for review/approval because:  Last appointment > 18 months ago, has no showed and cancelled last scheduled appointments. Fill or wait until appointment?

## 2019-12-24 NOTE — TELEPHONE ENCOUNTER
"Long Acting Insulin Protocol [id number]/24 9:21 AM   LDL on file in past 12 months    Microalbumin on file in past 12 months    HgbA1C in past 3 or 6 months     Type 2    Clinic notes 07/24/2019:\"Toujeo 170 units SQ each am \"  Sent to clinic coordinators for scheduling.   Linn Kelly, RN on 12/24/2019 at 9:29 AM       "

## 2019-12-27 RX ORDER — NICOTINE 21 MG/24HR
1 PATCH, TRANSDERMAL 24 HOURS TRANSDERMAL EVERY 24 HOURS
Qty: 30 PATCH | OUTPATIENT
Start: 2019-12-27

## 2019-12-31 NOTE — PROGRESS NOTES
Addendum 12/31/19    Left message for patient that they have not been compliant with having the necessary lab work for their anticoagulation therapy. Patient  has been sent two letters stating that they have missed their lab appointments and that it is very important to have labs done to make sure that they are in their therapeutic range to minimize the risks of bleeding and clotting. Patient's Physician has been notified of their non compliance.I asked them to call us or come in for their lab work as soon as possible.

## 2020-01-13 DIAGNOSIS — E03.8 OTHER SPECIFIED HYPOTHYROIDISM: ICD-10-CM

## 2020-01-14 RX ORDER — LEVOTHYROXINE SODIUM 200 UG/1
TABLET ORAL
Qty: 102 TABLET | Refills: 1 | Status: SHIPPED | OUTPATIENT
Start: 2020-01-14 | End: 2020-08-28

## 2020-01-14 NOTE — TELEPHONE ENCOUNTER
LEVOTHYROXINE SODIUM 200MCG TABS      Last Written Prescription Date:  4/12/19  Last Fill Quantity: 102,   # refills: 1  Last Office Visit : 7/24/19 with recommended 1 week follow up  Future Office visit:  None scheduled    Routing refill request to provider for review/approval because:  Overdue for TSH      Lab Test 10/11/18  1108   TSH 2.06

## 2020-01-17 NOTE — ADDENDUM NOTE
Addended by: IGNACIO DUNBAR on: 12/12/2017 09:41 AM     Modules accepted: Aayush Matthews     PAIN SCALE 7 OF 10.

## 2020-01-21 ENCOUNTER — PRE VISIT (OUTPATIENT)
Dept: INTERNAL MEDICINE | Facility: CLINIC | Age: 45
End: 2020-01-21

## 2020-01-21 DIAGNOSIS — Z79.4 TYPE 2 DIABETES MELLITUS WITH HYPERGLYCEMIA, WITH LONG-TERM CURRENT USE OF INSULIN (H): Primary | ICD-10-CM

## 2020-01-21 DIAGNOSIS — E11.65 TYPE 2 DIABETES MELLITUS WITH HYPERGLYCEMIA, WITH LONG-TERM CURRENT USE OF INSULIN (H): Primary | ICD-10-CM

## 2020-01-21 DIAGNOSIS — Z00.00 HEALTHCARE MAINTENANCE: ICD-10-CM

## 2020-01-21 NOTE — TELEPHONE ENCOUNTER
[facility]  Dept: [phone]     Patient: Pricilla Brown   : 1975  MRN: [id number]  Encounter: 20       Pre Visit Assessment    Health Maintenance Due   Topic Date Due     HF ACTION PLAN  1975     ASTHMA ACTION PLAN  1974     ASTHMA CONTROL TEST  1974     HIV SCREENING  1989     MEDICARE ANNUAL WELLNESS VISIT  1992     OP ANNUAL INR REFERRAL  2017     MICROALBUMIN  2018     DIABETIC FOOT EXAM  2018     INFLUENZA VACCINE (1) 2019     PAP  2019     LIPID  10/11/2019     A1C  10/24/2019     EYE EXAM  10/24/2019     PHQ-2  2020     PHQ-9  2020     Chart Reviewed for Labs needed/Health Maintenance: Yes   If labs needed, orders placed:  Yes Hgb A1c, Fasting Lipid, Urine micro albumin and TSH with Reflex, HIV  Lab appointment scheduled:  N/A    Notes:  Pt will go to lab within the next few days and do fastin lab,  Patient confirmed they will attend appointment:  N/A  Appointment rescheduled?  N/A      Christel Correa at 1:31 PM on 2020

## 2020-01-27 ENCOUNTER — TELEPHONE (OUTPATIENT)
Dept: SLEEP MEDICINE | Facility: CLINIC | Age: 45
End: 2020-01-27

## 2020-01-27 NOTE — TELEPHONE ENCOUNTER
Received a request for critical life-sustaining medical equipment and emergency form from [employer].    Forms placed in providers in box to review and sign.    [facility] [place] [facility]

## 2020-01-30 DIAGNOSIS — I50.32 CHRONIC DIASTOLIC HEART FAILURE (H): Primary | ICD-10-CM

## 2020-02-08 ENCOUNTER — HEALTH MAINTENANCE LETTER (OUTPATIENT)
Age: 45
End: 2020-02-08

## 2020-02-09 ENCOUNTER — DOCUMENTATION ONLY (OUTPATIENT)
Dept: SLEEP MEDICINE | Facility: CLINIC | Age: 45
End: 2020-02-09

## 2020-02-10 NOTE — PROGRESS NOTES
Download 25/30 days with use >=4 hrs, avg usage days used 8:37, apap 5-15cm h20, residual AHI 15.6, obstructive 3.3, unknown 8.6, hI 3.6     LEAK 65.5 L/MIN AT THE 95TH PERCENTILE, 95TH PERCENTILE PRESSURE 14.2CM H20    [facility] FOR[employer] SUPPLIED    WILL CHECK ON DATE OF LAST OV, IT'S MY UNDERSTANDING THAT SHE'S DUE FOR A VISIT, WE NEED TO KNOW CAUSE OF EXCESSIVE LEAK    Interpretation Summary     Technically difficult study.Extremely poor acoustic windows.  Limited information was obtained during study.  Left ventricular size is normal.  LVEF 43% based on biplane 2D tracing.  The inferior vena cava is normal.  _____________________________________________________________________________  __        Left Ventricle  Left ventricular size is normal. LVEF 43% based on biplane 2D tracing.     Right Ventricle  Right ventricular function cannot be assessed due to poor image quality.     Atria  The atria cannot be assessed.     Mitral Valve  The mitral valve cannot be assessed.        Aortic Valve  The aortic valve cannot be assessed.     Tricuspid Valve  The tricuspid valve cannot be assessed.     Pulmonic Valve  The pulmonic valve cannot be assessed.     Vessels  The aorta root cannot be assessed. The pulmonary artery cannot be assessed.  The inferior vena cava is normal.     Pericardium  Cannot assess pericardial effusion.     _____________________________________________________________________________

## 2020-02-24 DIAGNOSIS — Z79.01 LONG TERM CURRENT USE OF ANTICOAGULANT THERAPY: Primary | ICD-10-CM

## 2020-02-24 RX ORDER — WARFARIN SODIUM 5 MG/1
TABLET ORAL
Qty: 120 TABLET | Refills: 0 | Status: SHIPPED | OUTPATIENT
Start: 2020-02-24 | End: 2020-04-05

## 2020-02-26 NOTE — PROGRESS NOTES
Addendum 2/26/20  Left message for patient that they have not been compliant with having the necessary lab work for their anticoagulation therapy. Patient  has been sent two letters stating that they have missed their lab appointments and that it is very important to have labs done to make sure that they are in their therapeutic range to minimize the risks of bleeding and clotting.    Last INR was in Oct 2019.   Patient will be inactivated from our monitoring service if no phone call or labs by 3/13/20    Mitchel Loyd [facility].

## 2020-03-13 ENCOUNTER — ANTICOAGULATION THERAPY VISIT (OUTPATIENT)
Dept: ANTICOAGULATION | Facility: CLINIC | Age: 45
End: 2020-03-13

## 2020-03-13 DIAGNOSIS — Z79.01 LONG TERM CURRENT USE OF ANTICOAGULANT THERAPY: ICD-10-CM

## 2020-03-13 DIAGNOSIS — I48.91 ATRIAL FIBRILLATION (H): ICD-10-CM

## 2020-03-13 NOTE — Clinical Note
Pricilla KAUR has been noncompliant and is being inactivated from the [facility]

## 2020-03-13 NOTE — PROGRESS NOTES
Patient is being inactivated from the [facility] Monitoring Service. They have been non compliant in having the necessary lab work done for their anticoagulation therapy and have not responded to our letters or telephone calls.    Mitchel Loyd RPH

## 2020-03-24 DIAGNOSIS — E87.6 HYPOKALEMIA: ICD-10-CM

## 2020-03-24 RX ORDER — POTASSIUM CHLORIDE 1500 MG/1
40 TABLET, EXTENDED RELEASE ORAL 4 TIMES DAILY
Qty: 240 TABLET | Refills: 6 | Status: SHIPPED | OUTPATIENT
Start: 2020-03-24 | End: 2021-07-27

## 2020-03-27 ENCOUNTER — TELEPHONE (OUTPATIENT)
Dept: ENDOCRINOLOGY | Facility: CLINIC | Age: 45
End: 2020-03-27

## 2020-03-27 ENCOUNTER — NURSE TRIAGE (OUTPATIENT)
Dept: NURSING | Facility: CLINIC | Age: 45
End: 2020-03-27

## 2020-03-27 NOTE — TELEPHONE ENCOUNTER
[facility] Call Center    Phone Message    May a detailed message be left on voicemail: yes     Reason for Call: pt requesting a call back from jacqueline fuller. For the past week pt cannot get blood sugar below 280. She is asking if her insulin needed to be increased. Pt also requested to speak with triage nurse. I transferred pt over to triage. Please call pt back at . Thank you     Action Taken: Message routed to:  [facility] ([facility]): endo    Travel Screening: Not Applicable

## 2020-03-27 NOTE — TELEPHONE ENCOUNTER
[facility]: Nurse Triage Note  SITUATION/BACKGROUND                                                      Pricilla Brown is a 45 year old female who calls with increased blood sugar.    Description:  Blood sugar running 280 or above  fatigue  Onset/duration:  For the last week  Precip. factors: type 2 diabetes mellitus   Associated symptoms:  Urinating frequently  Pain scale (0-10)   0/10    MEDICATIONS:   Taking medication(s) as prescribed? Yes  Taking over the counter medication(s?) No  Any barriers to taking medication(s) as prescribed?  No  Medication(s) improving/managing symptoms?  No    Allergies:   Allergies   Allergen Reactions     Penicillins Other (See Comments)     CHILDHOOD ALLERGY     Ibuprofen Sodium Hives and Rash       ASSESSMENT      45 year old female with type 2 diabetes mellitus calls with increase blood sugars.  She states her blood sugar has been above 280 for the last week   Into the 300's.  She feels fatigued and states she is urinating frequently.  She denies changes in diet or activity   She denies cold,fever or change in medication       She is calling late on a Friday night   Will not receive a call back from Isela Archibald until Monday   Recommended she call the endocrinologist resident on call for advise with her dosing.        RECOMMENDATION/PLAN                                                      RECOMMENDED DISPOSITION:  See above  Will comply with recommendation: Yes    If further questions/concerns or if symptoms do not improve, worsen or new symptoms develop, call your PCP or [phone] to talk with the Resident on call, as soon as possible.    Guideline used: diabetes problem  Telephone Triage Protocols for Nurses, Fifth Edition, Abigail Faye, TA, RN

## 2020-03-27 NOTE — TELEPHONE ENCOUNTER
Past week has noticed can't keep blood sugars below 280. Prior to this week was having blood sugars below 260. It has been a while since she saw Thelma Archibald and needs follow-up. She is checking blood 4 times per day. No lows. She feels tired and weak and has some increased urinary frequency. No fevers, no cough, no dysuria, no diarrhea, no nausea/vomiting. No recent steroid use or steroid injections. She can't think of anything that was different this week to cause the higher blood sugars. She has been trying to limit carbohydrates as best she can.    Current regimen:  - Toujeo 170 units daily  - Trulcity 1.5mg weekly  - Novolog 70 units with meals and correction scale  - Jardiance 25mg daily    Plan  - Increase Toujeo to 185 units daily  - Increase Novolog to 75 units with meals and correction scale  - Continue Jardiance and Trulicity  - Advised patient to limit excess carbs   - Advised patient to speak with Thelma Archibald about her regimen. With the amount of insulin she is on, she may benefit from U-500 insulin. I will send staff message to Thelma Archibald.    Marty Mukherjee MD  PGY5, Endocrinology Fellow

## 2020-03-30 ENCOUNTER — TELEPHONE (OUTPATIENT)
Dept: ENDOCRINOLOGY | Facility: CLINIC | Age: 45
End: 2020-03-30

## 2020-03-30 DIAGNOSIS — Z79.4 TYPE 2 DIABETES MELLITUS WITH HYPERGLYCEMIA, WITH LONG-TERM CURRENT USE OF INSULIN (H): ICD-10-CM

## 2020-03-30 DIAGNOSIS — E11.65 TYPE 2 DIABETES MELLITUS WITH HYPERGLYCEMIA, WITH LONG-TERM CURRENT USE OF INSULIN (H): ICD-10-CM

## 2020-03-30 DIAGNOSIS — E11.42 TYPE 2 DIABETES MELLITUS WITH DIABETIC POLYNEUROPATHY, WITHOUT LONG-TERM CURRENT USE OF INSULIN (H): ICD-10-CM

## 2020-03-30 NOTE — TELEPHONE ENCOUNTER
Dr Mukherjee did call Pricilla over the weekend with  Toujeo changed to 185 units daily and Novolog 75 units daily with meals plus corrections . New orders routed to  him to sign off on. She is having  Increased urination  X 1 week . No burning, no fevers just going  frequently with large amounts. She is set up for a telephone call Wednesday 10:45 AM to discuss with Isela Archibald. She is on Jardiance that can  cause increased urination . She does not know  why her blood sugars are higher .Sarina Haji RN on 3/30/2020 at 9:46 AM

## 2020-03-30 NOTE — TELEPHONE ENCOUNTER
I spoke with Pricilla this morning and scheduled her for a telephone visit Wednesday with Isela Archibald. She did get an increase in insulin by on call Dr Mukherjee  Over the weekend . Sarina Haji RN on 3/30/2020 at 9:47 AM

## 2020-04-01 ENCOUNTER — VIRTUAL VISIT (OUTPATIENT)
Dept: ENDOCRINOLOGY | Facility: CLINIC | Age: 45
End: 2020-04-01
Payer: MEDICARE

## 2020-04-01 DIAGNOSIS — Z53.9 NO SHOW: Primary | ICD-10-CM

## 2020-04-01 RX ORDER — INSULIN GLARGINE 300 U/ML
INJECTION, SOLUTION SUBCUTANEOUS
Qty: 30 ML | Refills: 3 | Status: SHIPPED | OUTPATIENT
Start: 2020-04-01 | End: 2020-04-02

## 2020-04-01 RX ORDER — INSULIN ASPART 100 [IU]/ML
INJECTION, SOLUTION INTRAVENOUS; SUBCUTANEOUS
Qty: 75 ML | Refills: 1 | Status: SHIPPED | OUTPATIENT
Start: 2020-04-01 | End: 2020-04-02

## 2020-04-01 NOTE — PROGRESS NOTES
Patient no-showed today's appointment; provider notified for review of record, patient agrees to reschedule missed appointment.

## 2020-04-02 ENCOUNTER — MYC REFILL (OUTPATIENT)
Dept: ENDOCRINOLOGY | Facility: CLINIC | Age: 45
End: 2020-04-02

## 2020-04-02 ENCOUNTER — MYC REFILL (OUTPATIENT)
Dept: INTERNAL MEDICINE | Facility: CLINIC | Age: 45
End: 2020-04-02

## 2020-04-02 ENCOUNTER — MYC REFILL (OUTPATIENT)
Dept: CARDIOLOGY | Facility: CLINIC | Age: 45
End: 2020-04-02

## 2020-04-02 DIAGNOSIS — Z79.4 TYPE 2 DIABETES MELLITUS WITH HYPERGLYCEMIA, WITH LONG-TERM CURRENT USE OF INSULIN (H): ICD-10-CM

## 2020-04-02 DIAGNOSIS — J45.20 MILD INTERMITTENT ASTHMA WITHOUT COMPLICATION: ICD-10-CM

## 2020-04-02 DIAGNOSIS — L02.221 FURUNCLE OF ABDOMINAL WALL: ICD-10-CM

## 2020-04-02 DIAGNOSIS — I50.23 ACUTE ON CHRONIC SYSTOLIC HEART FAILURE (H): ICD-10-CM

## 2020-04-02 DIAGNOSIS — M79.672 LEFT FOOT PAIN: ICD-10-CM

## 2020-04-02 DIAGNOSIS — E11.42 TYPE 2 DIABETES MELLITUS WITH DIABETIC POLYNEUROPATHY, WITHOUT LONG-TERM CURRENT USE OF INSULIN (H): ICD-10-CM

## 2020-04-02 DIAGNOSIS — Z72.0 TOBACCO ABUSE: ICD-10-CM

## 2020-04-02 DIAGNOSIS — B35.1 DERMATOPHYTOSIS OF NAIL: ICD-10-CM

## 2020-04-02 DIAGNOSIS — E11.65 TYPE 2 DIABETES MELLITUS WITH HYPERGLYCEMIA, WITH LONG-TERM CURRENT USE OF INSULIN (H): ICD-10-CM

## 2020-04-03 RX ORDER — CLOTRIMAZOLE 1 %
CREAM (GRAM) TOPICAL 2 TIMES DAILY PRN
Qty: 15 G | Refills: 0 | OUTPATIENT
Start: 2020-04-03

## 2020-04-03 RX ORDER — INSULIN GLARGINE 300 U/ML
INJECTION, SOLUTION SUBCUTANEOUS
Qty: 30 ML | Refills: 3 | Status: SHIPPED | OUTPATIENT
Start: 2020-04-03 | End: 2020-04-16

## 2020-04-03 RX ORDER — CAPSAICIN 0.025 %
1 CREAM (GRAM) TOPICAL 3 TIMES DAILY PRN
Qty: 1 TUBE | Refills: 0 | Status: SHIPPED | OUTPATIENT
Start: 2020-04-03 | End: 2020-08-06

## 2020-04-03 RX ORDER — ISOSORBIDE DINITRATE 20 MG/1
20 TABLET ORAL 3 TIMES DAILY
Qty: 270 TABLET | Refills: 1 | Status: SHIPPED | OUTPATIENT
Start: 2020-04-03 | End: 2021-09-09

## 2020-04-03 RX ORDER — ALBUTEROL SULFATE 90 UG/1
2 AEROSOL, METERED RESPIRATORY (INHALATION) EVERY 6 HOURS PRN
Qty: 75 G | Refills: 0 | Status: SHIPPED | OUTPATIENT
Start: 2020-04-03 | End: 2022-01-21

## 2020-04-03 RX ORDER — NICOTINE 21 MG/24HR
1 PATCH, TRANSDERMAL 24 HOURS TRANSDERMAL EVERY 24 HOURS
Qty: 30 PATCH | Refills: 0 | OUTPATIENT
Start: 2020-04-03

## 2020-04-03 RX ORDER — INSULIN ASPART 100 [IU]/ML
INJECTION, SOLUTION INTRAVENOUS; SUBCUTANEOUS
Qty: 75 ML | Refills: 1 | Status: SHIPPED | OUTPATIENT
Start: 2020-04-03 | End: 2020-06-05

## 2020-04-03 NOTE — TELEPHONE ENCOUNTER
clotrimazole (LOTRIMIN) 1 % external cream       Last Written Prescription Date:  7/29/19  Last Fill Quantity: 30 g,   # refills: 0  Last Office Visit : 9/20/18 with multiple no shows since  Future Office visit:  None scheduled    Routing refill request to provider for review/approval because:  Has been greater than 18 months since last visit      nicotine (NICODERM CQ) 14 MG/24HR 24 hr patch           Last Written Prescription Date:  11/30/19  Last Fill Quantity: 90 patches,   # refills: 0  Last Office Visit : 9/50/18 with multiple no shows since  Future Office visit:  None scheduled    Routing refill request to provider for review/approval because:  Has been greater than 18 months since last visit

## 2020-04-03 NOTE — TELEPHONE ENCOUNTER
diclofenac (VOLTAREN) 1 % topical gel       Last Written Prescription Date:  7/24/19  Last Fill Quantity: 100 g ,   # refills: 1  Last Office Visit : 9/20/18  Future Office visit:  None scheduled    Routing refill request to provider for review/approval because:  Greater than 18 months since last visit, many no shows since last seen.  Refill?

## 2020-04-05 ENCOUNTER — MYC REFILL (OUTPATIENT)
Dept: CARDIOLOGY | Facility: CLINIC | Age: 45
End: 2020-04-05

## 2020-04-05 ENCOUNTER — MYC REFILL (OUTPATIENT)
Dept: INTERNAL MEDICINE | Facility: CLINIC | Age: 45
End: 2020-04-05

## 2020-04-05 ENCOUNTER — MYC REFILL (OUTPATIENT)
Dept: ORTHOPEDICS | Facility: CLINIC | Age: 45
End: 2020-04-05

## 2020-04-05 ENCOUNTER — MYC REFILL (OUTPATIENT)
Dept: ENDOCRINOLOGY | Facility: CLINIC | Age: 45
End: 2020-04-05

## 2020-04-05 DIAGNOSIS — E11.9 DIABETES MELLITUS, TYPE 2 (H): ICD-10-CM

## 2020-04-05 DIAGNOSIS — Z72.0 TOBACCO ABUSE: ICD-10-CM

## 2020-04-05 DIAGNOSIS — I50.33 ACUTE ON CHRONIC HEART FAILURE WITH PRESERVED EJECTION FRACTION (H): ICD-10-CM

## 2020-04-05 DIAGNOSIS — M79.2 NEUROPATHIC PAIN OF LOWER EXTREMITY, UNSPECIFIED LATERALITY: ICD-10-CM

## 2020-04-05 DIAGNOSIS — B35.1 DERMATOPHYTOSIS OF NAIL: ICD-10-CM

## 2020-04-05 RX ORDER — BUMETANIDE 1 MG/1
5 TABLET ORAL 2 TIMES DAILY
Qty: 300 TABLET | Refills: 11 | Status: CANCELLED | OUTPATIENT
Start: 2020-04-05

## 2020-04-06 ENCOUNTER — ANTICOAGULATION THERAPY VISIT (OUTPATIENT)
Dept: ANTICOAGULATION | Facility: CLINIC | Age: 45
End: 2020-04-06

## 2020-04-06 ENCOUNTER — PATIENT OUTREACH (OUTPATIENT)
Dept: CARDIOLOGY | Facility: CLINIC | Age: 45
End: 2020-04-06

## 2020-04-06 DIAGNOSIS — I48.20 CHRONIC ATRIAL FIBRILLATION (H): Primary | ICD-10-CM

## 2020-04-06 DIAGNOSIS — Z79.01 LONG TERM CURRENT USE OF ANTICOAGULANT THERAPY: ICD-10-CM

## 2020-04-06 RX ORDER — PEN NEEDLE, DIABETIC 32GX 5/32"
NEEDLE, DISPOSABLE MISCELLANEOUS
Qty: 600 EACH | Refills: 3 | Status: SHIPPED | OUTPATIENT
Start: 2020-04-06 | End: 2020-10-05

## 2020-04-06 RX ORDER — NYSTATIN 100000 U/G
CREAM TOPICAL 2 TIMES DAILY
Qty: 90 G | Refills: 6 | Status: ON HOLD | OUTPATIENT
Start: 2020-04-06 | End: 2023-01-01

## 2020-04-06 RX ORDER — WARFARIN SODIUM 5 MG/1
TABLET ORAL
Qty: 120 TABLET | Refills: 0 | OUTPATIENT
Start: 2020-04-06

## 2020-04-06 RX ORDER — ACETAMINOPHEN 325 MG/1
650 TABLET ORAL 3 TIMES DAILY PRN
Qty: 180 TABLET | Refills: 0 | OUTPATIENT
Start: 2020-04-06

## 2020-04-06 RX ORDER — NICOTINE 21 MG/24HR
1 PATCH, TRANSDERMAL 24 HOURS TRANSDERMAL EVERY 24 HOURS
Qty: 90 PATCH | Refills: 0 | OUTPATIENT
Start: 2020-04-06

## 2020-04-06 NOTE — TELEPHONE ENCOUNTER
Refill refused for same med on 7/24/19  Will check with Dr. Jean to see if she needs virtual visit before any further refills.    nystatin (MYCOSTATIN) [id number] UNIT/GM external cream       Last Written Prescription Date:  4/12/19  Last Fill Quantity: 90 g,   # refills: 6  Last Office Visit: 3/18/19  Future Office visit: none    Zac Grace RN

## 2020-04-06 NOTE — PROGRESS NOTES
Refill request received regarding Warfarin. No INR's noted in Epic. Patient was called and states that she has not been following with the coumadin clinic, does not check her INR's for a while now. Patient given the number to the coumadin clinic and told she must reach out to them and get INR tested prior to getting refill.  This staff called the coumadin clinic and they had just spoken to patient and they will reactivated her. She will come in for an INR today or tomorrow. Patient also was interested in a Telephone Visit with Dr. Alcala and was scheduled for this Thursday.

## 2020-04-06 NOTE — PROGRESS NOTES
Patient called to say that she needs a refill on her coumadin. She has been in touch with Dr. Alcala's clinic, and they said that she has to be followed by a coumadin clinic in order for them to refill the coumadin.  Alexandru, nurse with [facility], has agreed to put in the coumadin refill, and has scheduled a virtual appt with Dr. Alcala for patient on Thursday.  Pt said that she will get an INR either today or tomorrow.  I will put pt in our census again, and stressed to her that we need INRs done whenever she is asked to do it.

## 2020-04-08 ENCOUNTER — TELEPHONE (OUTPATIENT)
Dept: CARDIOLOGY | Facility: CLINIC | Age: 45
End: 2020-04-08
Payer: MEDICARE

## 2020-04-08 ENCOUNTER — CARE COORDINATION (OUTPATIENT)
Dept: CARDIOLOGY | Facility: CLINIC | Age: 45
End: 2020-04-08

## 2020-04-08 DIAGNOSIS — I50.33 ACUTE ON CHRONIC HEART FAILURE WITH PRESERVED EJECTION FRACTION (H): Primary | ICD-10-CM

## 2020-04-08 DIAGNOSIS — D64.9 ANEMIA: ICD-10-CM

## 2020-04-08 PROCEDURE — 99442 ZZC PHYSICIAN TELEPHONE EVALUATION 11-20 MIN: CPT | Performed by: INTERNAL MEDICINE

## 2020-04-08 NOTE — TELEPHONE ENCOUNTER
Impression:  1. Paroxysmal atrial fibrillation   Warfarin anticoagulation  2. Morbid obesity  3. Diabetes  4. Hypothyroidism  5. Gout  6. HFpEF  7. Diabetes    I conducted a virtual visit with this patient. We discussed the patient's current condition, reviewed interim history since last visit, current functional status, diet, and possible cardiac symptoms including: chest pain, tightness, heaviness, pressure, PND, orthopnea, ankle edema, increasing abdominal girth, weight gain, palpitation, pre-syncope or syncope.      Telephone time 15 minutes    We reviewed and suggested:  1. Viral epidemic safety suggestions  2. Continued adherence to medical regimen, diet, and exercise program as previously described  3. Current scheduled visit deferred in light of current situation  4. Call immediately should your health status change, and we will make arrangements to see you immediately  5. Laboratories for tomorrow ordered: CMP, CBC, iron  6. Strict adherence to calorically controlled weight loss diet  7. Will potentially augment diuretics based on laboratories  8. See [facility] in 4 weeks            The patient returns for telephone  follow-up of heart failure associated with HFpEF.  This is carried out with her permission.  There is no interim history of chest pain, tightness, paroxysmal nocturnal dyspnea, orthopnea, peripheral edema, but recent palpitationwithout pre-syncope, syncope, device discharge.  Exercise tolerance is stable.  The patient is attempting to exercise regularly and following a sodium restricted, calorically appropriate diet.  Medications are reviewed and the patient is taking medications as prescribed.  The patient is generally sleeping well. INR is therapeutic and now thromboembolic or bleed complications.  The patient believes she has gain weight and has decreased exercise tolerance and mobility. She has no symptoms of clotting, embolism or bleeding while on anticoagulants. She is not aware how  much weight she has gained.  She has chronic lymphedema and this has increased.      Constitutional: weight loss, fever, chills, night sweats  HEENT: without visual changes, swallow difficulties  Pulmonary: hout shortness of breath, cough, wheeze, hemoptysis  Cardiac: without chest pain,+ MURRAY, PND, orthopnea, and no  edema, palpitation, pre-syncope, syncope,  GI: without diarrhea, constipation, jaundice, melena, GERD, hematemesis  : without frequency, urgency, dysuria, hematuria  Skin: rash, bruise, open lesions  Neuro: without TIA, focal neurologic complaints, seizure, trauma  Ortho: without pain, swelling, mobility impairment  Endocrine: diabetes, thyroid, heat/cold intolerance, polyuria, polyphagia, change bowel habits.  Sleep:no JYOTI, periodic breathing,but  Fatigue and sometimes sleeps in chair  Other:     She is in atrial fibrillation with controlled response and adequate anticoagulation.      Constitutional: alert, oriented,, normal mentation.  Skin: without open lesions  Psych: oriented, verbal, mental status in tact    Current Outpatient Medications   Medication     acetaminophen (TYLENOL) 325 MG tablet     albuterol (PROAIR HFA/PROVENTIL HFA/VENTOLIN HFA) 108 (90 Base) MCG/ACT inhaler     bisacodyl (DULCOLAX) 10 MG suppository     blood glucose (ACCU-CHEK RON PLUS) test strip     blood glucose (NO BRAND SPECIFIED) lancets standard     blood glucose (NO BRAND SPECIFIED) test strip     blood glucose monitoring (IBG STAR) meter device kit     blood glucose monitoring (NO BRAND SPECIFIED) meter device kit     bumetanide (BUMEX) 1 MG tablet     buPROPion (WELLBUTRIN SR) 100 MG 12 hr tablet     capsaicin (ZOSTRIX) 0.025 % external cream     ciclopirox (LOPROX) 0.77 % cream     ciclopirox (PENLAC) 8 % external solution     clotrimazole (LOTRIMIN) 1 % external cream     colchicine (COLCYRS) 0.6 MG tablet     diclofenac (VOLTAREN) 1 % topical gel     dulaglutide (TRULICITY) 1.5 MG/0.5ML pen     DULoxetine  (CYMBALTA) 20 MG capsule     Efinaconazole 10 % SOLN     empagliflozin (JARDIANCE) 25 MG TABS tablet     gabapentin (NEURONTIN) 300 MG capsule     hydrALAZINE (APRESOLINE) 25 MG tablet     insulin glargine U-300 (TOUJEO SOLOSTAR) 300 UNIT/ML (1 units dial) pen     insulin glargine U-300 (TOUJEO) 300 UNIT/ML injection     insulin pen needle (BD RIVER U/F) 32G X 4 MM miscellaneous     isosorbide dinitrate (ISORDIL) 20 MG tablet     levothyroxine (SYNTHROID/LEVOTHROID) 200 MCG tablet     metolazone (ZAROXOLYN) 2.5 MG tablet     metoprolol succinate ER (TOPROL-XL) 200 MG 24 hr tablet     morphine (MS CONTIN) 15 MG 12 hr tablet     nicotine (NICODERM CQ) 14 MG/24HR 24 hr patch     NOVOLOG FLEXPEN 100 UNIT/ML soln     nystatin (MYCOSTATIN) 457198 UNIT/GM external cream     order for DME     order for DME     order for DME     ORDER FOR DME     oxyCODONE-acetaminophen (PERCOCET) 5-325 MG per tablet     polyethylene glycol (MIRALAX/GLYCOLAX) powder     potassium chloride ER (KLOR-CON M) 20 MEQ CR tablet     Respiratory Therapy Supplies (NEBULIZER COMPRESSOR) KIT     senna (SENOKOT) 8.6 MG tablet     spironolactone (ALDACTONE) 50 MG tablet     tiotropium (SPIRIVA HANDIHALER) 18 MCG inhalation capsule     topiramate (TOPAMAX) 50 MG tablet     warfarin (COUMADIN) 5 MG tablet     warfarin (COUMADIN) 5 MG tablet     warfarin (COUMADIN) 5 MG tablet     warfarin (COUMADIN) 5 MG tablet     warfarin ANTICOAGULANT (COUMADIN) 5 MG tablet     No current facility-administered medications for this visit.      Facility-Administered Medications Ordered in Other Visits   Medication     sodium chloride (PF) 0.9% PF flush 10 mL       Wt Readings from Last 24 Encounters:   10/29/19 (!) 226.7 kg (499 lb 11.2 oz)   07/24/19 (!) 223.9 kg (493 lb 9.6 oz)   07/23/19 (!) 229.5 kg (506 lb)   06/27/19 (!) 225.9 kg (498 lb)   03/13/19 (!) 227.2 kg (500 lb 14.4 oz)   03/13/19 (!) 227.3 kg (501 lb)   02/19/19 (!) 229.5 kg (506 lb)   01/07/19 (!) 224.6 kg  (495 lb 1.6 oz)   18 (!) 220.1 kg (485 lb 4.8 oz)   18 (!) 220 kg (485 lb)   10/30/18 (!) 215.1 kg (474 lb 1.6 oz)   10/25/18 (!) 211.4 kg (466 lb)   10/11/18 (!) 211.5 kg (466 lb 4.8 oz)   10/09/18 (!) 207 kg (456 lb 4.8 oz)   18 (!) 206.8 kg (456 lb)   18 (!) 210.7 kg (464 lb 9.6 oz)   18 (!) 212.4 kg (468 lb 3.2 oz)   18 (!) 210.5 kg (464 lb)   18 (!) 213.7 kg (471 lb 3.2 oz)   18 (!) 206.2 kg (454 lb 8 oz)   18 (!) 211.7 kg (466 lb 12.8 oz)   18 (!) 209.1 kg (460 lb 14.4 oz)   18 (!) 208.7 kg (460 lb)   18 (!) 209.6 kg (462 lb)         Name: BRAXTON SALMERON  MRN: [id number]  : 1975  Study Date: 2019 11:41 AM  Age: 44 yrs  Gender: Female  Patient Location: [facility]  Reason For Study: Chronic diastolic heart failure (H)  Ordering Physician: CLIFFORD BARDALES  Referring Physician: CLIFFORD BARDALES  Performed By: Shakila Cummings RDCS     BSA: 2.9 m2  Height: 67 in  Weight: 470 lb  BP: 149/87 mmHg  _____________________________________________________________________________  __        Procedure  Echocardiogram with two-dimensional, color and spectral Doppler performed.  Contrast Optison. Technically difficult study.Extremely poor acoustic windows.  Limited information was obtained during study. Optison (NDC #2566-6778-14)  given intravenously. Patient was given 5 ml mixture of 3 ml Optison and 6 ml  saline. 4 ml wasted. IV start location R Upper arm .  _____________________________________________________________________________  __        Interpretation Summary     Technically difficult study.Extremely poor acoustic windows.  Limited information was obtained during study.  Left ventricular size is normal.  LVEF 43% based on biplane 2D tracing.  The inferior vena cava is normal        Percy Alcala

## 2020-04-09 ENCOUNTER — VIRTUAL VISIT (OUTPATIENT)
Dept: CARDIOLOGY | Facility: CLINIC | Age: 45
End: 2020-04-09
Attending: INTERNAL MEDICINE
Payer: MEDICARE

## 2020-04-09 ENCOUNTER — PATIENT OUTREACH (OUTPATIENT)
Dept: CARDIOLOGY | Facility: CLINIC | Age: 45
End: 2020-04-09

## 2020-04-09 DIAGNOSIS — I42.9 CARDIOMYOPATHY, UNSPECIFIED TYPE (H): Primary | ICD-10-CM

## 2020-04-09 DIAGNOSIS — I50.23 ACUTE ON CHRONIC SYSTOLIC HEART FAILURE (H): ICD-10-CM

## 2020-04-09 DIAGNOSIS — D50.9 IRON DEFICIENCY ANEMIA, UNSPECIFIED IRON DEFICIENCY ANEMIA TYPE: ICD-10-CM

## 2020-04-09 PROCEDURE — 99442 ZZC PHYSICIAN TELEPHONE EVALUATION 11-20 MIN: CPT | Mod: ZP | Performed by: INTERNAL MEDICINE

## 2020-04-09 RX ORDER — METOLAZONE 2.5 MG/1
2.5 TABLET ORAL 2 TIMES DAILY
Qty: 180 TABLET | Refills: 3 | Status: SHIPPED | OUTPATIENT
Start: 2020-04-09 | End: 2021-09-21

## 2020-04-09 NOTE — PROGRESS NOTES
Received request for coumadin refill on 4/6/20. It was noted that patient has not had an INR since 10/29/19. Called and spoke to coumadin clinic who state that patient had been inactivated related to non compliance with INR testing. Called and spoke to patient and told her that she needed to get an INR and would be reinstated with the coumadin clinic. Patient stated she would get an INR the next day on Tuesday. Patient has not had an INR drawn. Called and spoke to patient and she is not going to have the blood test. Instructed patient that if she is not going to have INR drawn that it is too risky to be taking coumadin and that she should stop taking it. Will check with provider about alternative therapy. Patient is agreeable to plan. Will notify provider for further direction.

## 2020-04-09 NOTE — PROGRESS NOTES
Impression:  1. Paroxysmal atrial fibrillation              Warfarin anticoagulation  2. Morbid obesity  3. Diabetes  4. Hypothyroidism  5. Gout  6. HFpEF  7. Diabetes     I conducted a virtual visit by telephone with this patient and with her permission. We discussed the patient's current condition, reviewed interim history since last visit, current functional status, diet, and possible cardiac symptoms including: chest pain, tightness, heaviness, pressure, PND, orthopnea, ankle edema, increasing abdominal girth, weight gain, palpitation, pre-syncope or syncope.       Telephone time 15 minutes     We reviewed and suggested:  1. Viral epidemic safety suggestions  2. Continued adherence to medical regimen, diet, and exercise program as previously described  3. Current scheduled visit deferred in light of current situation  4. Call immediately should your health status change, and we will make arrangements to see you immediately  5. Laboratories for tomorrow ordered: CMP, CBC, iron  6. Strict adherence to calorically controlled weight loss diet  7. Will potentially augment diuretics based on laboratories  8. See [facility] in 4 weeks                 The patient returns for telephone  follow-up of heart failure associated with HFpEF.  This is carried out with her permission.  There is no interim history of chest pain, tightness, paroxysmal nocturnal dyspnea, orthopnea, peripheral edema, but recent palpitationwithout pre-syncope, syncope, device discharge.  Exercise tolerance is stable.  The patient is attempting to exercise regularly and following a sodium restricted, calorically appropriate diet.  Medications are reviewed and the patient is taking medications as prescribed.  The patient is generally sleeping well. INR is therapeutic and now thromboembolic or bleed complications.  The patient believes she has gain weight and has decreased exercise tolerance and mobility. She has no symptoms of clotting, embolism  or bleeding while on anticoagulants. She is not aware how much weight she has gained.  She has chronic lymphedema and this has increased.       Constitutional: weight loss, fever, chills, night sweats  HEENT: without visual changes, swallow difficulties  Pulmonary: hout shortness of breath, cough, wheeze, hemoptysis  Cardiac: without chest pain,+ MURRAY, PND, orthopnea, and no  edema, palpitation, pre-syncope, syncope,  GI: without diarrhea, constipation, jaundice, melena, GERD, hematemesis  : without frequency, urgency, dysuria, hematuria  Skin: rash, bruise, open lesions  Neuro: without TIA, focal neurologic complaints, seizure, trauma  Ortho: without pain, swelling, mobility impairment  Endocrine: diabetes, thyroid, heat/cold intolerance, polyuria, polyphagia, change bowel habits.  Sleep:no JYOTI, periodic breathing,but  Fatigue and sometimes sleeps in chair  Other:      She is in atrial fibrillation with controlled response and adequate anticoagulation.       Constitutional: alert, oriented,, normal mentation.  Skin: without open lesions  Psych: oriented, verbal, mental status in tact         Current Outpatient Medications   Medication     acetaminophen (TYLENOL) 325 MG tablet     albuterol (PROAIR HFA/PROVENTIL HFA/VENTOLIN HFA) 108 (90 Base) MCG/ACT inhaler     bisacodyl (DULCOLAX) 10 MG suppository     blood glucose (ACCU-CHEK RON PLUS) test strip     blood glucose (NO BRAND SPECIFIED) lancets standard     blood glucose (NO BRAND SPECIFIED) test strip     blood glucose monitoring (IBG STAR) meter device kit     blood glucose monitoring (NO BRAND SPECIFIED) meter device kit     bumetanide (BUMEX) 1 MG tablet     buPROPion (WELLBUTRIN SR) 100 MG 12 hr tablet     capsaicin (ZOSTRIX) 0.025 % external cream     ciclopirox (LOPROX) 0.77 % cream     ciclopirox (PENLAC) 8 % external solution     clotrimazole (LOTRIMIN) 1 % external cream     colchicine (COLCYRS) 0.6 MG tablet     diclofenac (VOLTAREN) 1 % topical gel      dulaglutide (TRULICITY) 1.5 MG/0.5ML pen     DULoxetine (CYMBALTA) 20 MG capsule     Efinaconazole 10 % SOLN     empagliflozin (JARDIANCE) 25 MG TABS tablet     gabapentin (NEURONTIN) 300 MG capsule     hydrALAZINE (APRESOLINE) 25 MG tablet     insulin glargine U-300 (TOUJEO SOLOSTAR) 300 UNIT/ML (1 units dial) pen     insulin glargine U-300 (TOUJEO) 300 UNIT/ML injection     insulin pen needle (BD RIVER U/F) 32G X 4 MM miscellaneous     isosorbide dinitrate (ISORDIL) 20 MG tablet     levothyroxine (SYNTHROID/LEVOTHROID) 200 MCG tablet     metolazone (ZAROXOLYN) 2.5 MG tablet     metoprolol succinate ER (TOPROL-XL) 200 MG 24 hr tablet     morphine (MS CONTIN) 15 MG 12 hr tablet     nicotine (NICODERM CQ) 14 MG/24HR 24 hr patch     NOVOLOG FLEXPEN 100 UNIT/ML soln     nystatin (MYCOSTATIN) 411865 UNIT/GM external cream     order for DME     order for DME     order for DME     ORDER FOR DME     oxyCODONE-acetaminophen (PERCOCET) 5-325 MG per tablet     polyethylene glycol (MIRALAX/GLYCOLAX) powder     potassium chloride ER (KLOR-CON M) 20 MEQ CR tablet     Respiratory Therapy Supplies (NEBULIZER COMPRESSOR) KIT     senna (SENOKOT) 8.6 MG tablet     spironolactone (ALDACTONE) 50 MG tablet     tiotropium (SPIRIVA HANDIHALER) 18 MCG inhalation capsule     topiramate (TOPAMAX) 50 MG tablet     warfarin (COUMADIN) 5 MG tablet     warfarin (COUMADIN) 5 MG tablet     warfarin (COUMADIN) 5 MG tablet     warfarin (COUMADIN) 5 MG tablet     warfarin ANTICOAGULANT (COUMADIN) 5 MG tablet      No current facility-administered medications for this visit.           Facility-Administered Medications Ordered in Other Visits   Medication     sodium chloride (PF) 0.9% PF flush 10 mL            Wt Readings from Last 24 Encounters:   10/29/19 (!) 226.7 kg (499 lb 11.2 oz)   07/24/19 (!) 223.9 kg (493 lb 9.6 oz)   07/23/19 (!) 229.5 kg (506 lb)   06/27/19 (!) 225.9 kg (498 lb)   03/13/19 (!) 227.2 kg (500 lb 14.4 oz)   03/13/19 (!)  227.3 kg (501 lb)   19 (!) 229.5 kg (506 lb)   19 (!) 224.6 kg (495 lb 1.6 oz)   18 (!) 220.1 kg (485 lb 4.8 oz)   18 (!) 220 kg (485 lb)   10/30/18 (!) 215.1 kg (474 lb 1.6 oz)   10/25/18 (!) 211.4 kg (466 lb)   10/11/18 (!) 211.5 kg (466 lb 4.8 oz)   10/09/18 (!) 207 kg (456 lb 4.8 oz)   18 (!) 206.8 kg (456 lb)   18 (!) 210.7 kg (464 lb 9.6 oz)   18 (!) 212.4 kg (468 lb 3.2 oz)   18 (!) 210.5 kg (464 lb)   18 (!) 213.7 kg (471 lb 3.2 oz)   18 (!) 206.2 kg (454 lb 8 oz)   18 (!) 211.7 kg (466 lb 12.8 oz)   18 (!) 209.1 kg (460 lb 14.4 oz)   18 (!) 208.7 kg (460 lb)   18 (!) 209.6 kg (462 lb)           Name: BRAXTON SALMERON  MRN: [id number]  : 1975  Study Date: 2019 11:41 AM  Age: 44 yrs  Gender: Female  Patient Location: [facility]  Reason For Study: Chronic diastolic heart failure (H)  Ordering Physician: CLIFFORD BARDALES  Referring Physician: CLIFFORD BARDALES  Performed By: Shakila Cummings RDCS     BSA: 2.9 m2  Height: 67 in  Weight: 470 lb  BP: 149/87 mmHg  _____________________________________________________________________________  __        Procedure  Echocardiogram with two-dimensional, color and spectral Doppler performed.  Contrast Optison. Technically difficult study.Extremely poor acoustic windows.  Limited information was obtained during study. Optison (NDC #8221-6952-14)  given intravenously. Patient was given 5 ml mixture of 3 ml Optison and 6 ml  saline. 4 ml wasted. IV start location R Upper arm .  _____________________________________________________________________________  __        Interpretation Summary     Technically difficult study.Extremely poor acoustic windows.  Limited information was obtained during study.  Left ventricular size is normal.  LVEF 43% based on biplane 2D tracing.  The inferior vena cava is normal           Percy Alcala

## 2020-04-15 DIAGNOSIS — E11.9 DIABETES MELLITUS, TYPE 2 (H): ICD-10-CM

## 2020-05-08 ENCOUNTER — MYC REFILL (OUTPATIENT)
Dept: ENDOCRINOLOGY | Facility: CLINIC | Age: 45
End: 2020-05-08

## 2020-05-08 DIAGNOSIS — E11.65 TYPE 2 DIABETES MELLITUS WITH HYPERGLYCEMIA, WITH LONG-TERM CURRENT USE OF INSULIN (H): ICD-10-CM

## 2020-05-08 DIAGNOSIS — Z79.4 TYPE 2 DIABETES MELLITUS WITH HYPERGLYCEMIA, WITH LONG-TERM CURRENT USE OF INSULIN (H): ICD-10-CM

## 2020-05-08 RX ORDER — INSULIN ASPART 100 [IU]/ML
INJECTION, SOLUTION INTRAVENOUS; SUBCUTANEOUS
Qty: 75 ML | Refills: 1 | Status: CANCELLED | OUTPATIENT
Start: 2020-05-08

## 2020-05-09 DIAGNOSIS — Z72.0 TOBACCO ABUSE: ICD-10-CM

## 2020-05-09 DIAGNOSIS — Z79.4 TYPE 2 DIABETES MELLITUS WITH HYPERGLYCEMIA, WITH LONG-TERM CURRENT USE OF INSULIN (H): ICD-10-CM

## 2020-05-09 DIAGNOSIS — E11.65 TYPE 2 DIABETES MELLITUS WITH HYPERGLYCEMIA, WITH LONG-TERM CURRENT USE OF INSULIN (H): ICD-10-CM

## 2020-05-11 RX ORDER — GABAPENTIN 300 MG/1
600 CAPSULE ORAL 2 TIMES DAILY
Qty: 360 CAPSULE | Refills: 0 | Status: SHIPPED | OUTPATIENT
Start: 2020-05-11 | End: 2020-08-11

## 2020-05-11 NOTE — TELEPHONE ENCOUNTER
gabapentin (NEURONTIN) 300 MG capsule       Last Written Prescription Date:  12/9/19  Last Fill Quantity: 360,   # refills: 0  Last Virtual Visit : 4/16/20 with recommended 2 week phone visit  Future Office visit:  None scheduled    Routing refill request to provider for review/approval because:  Drug not on the [facility], P or [facility] refill protocol or controlled substance

## 2020-05-13 ENCOUNTER — ANTICOAGULATION THERAPY VISIT (OUTPATIENT)
Dept: ANTICOAGULATION | Facility: CLINIC | Age: 45
End: 2020-05-13

## 2020-05-13 DIAGNOSIS — I48.20 CHRONIC ATRIAL FIBRILLATION (H): ICD-10-CM

## 2020-05-13 RX ORDER — BUPROPION HYDROCHLORIDE 100 MG/1
100 TABLET, EXTENDED RELEASE ORAL 2 TIMES DAILY
Qty: 60 TABLET | Refills: 0 | OUTPATIENT
Start: 2020-05-13

## 2020-05-13 RX ORDER — GABAPENTIN 300 MG/1
CAPSULE ORAL
Qty: 360 CAPSULE | Refills: 0 | OUTPATIENT
Start: 2020-05-13

## 2020-05-13 NOTE — TELEPHONE ENCOUNTER
BUPROPION HCL ER (SR) 100MG TB12 Take 1 tablet (100 mg) by mouth 2 times daily - Oral   Last Written Prescription Date:  7/24/2019  Last Fill Quantity: 180,   # refills: 0  Last Office Visit : 11/8/2018  Future Office visit:  None    Routing refill request to provider for review/approval because:  No visit in > than 18 months, NSh and cancel x 3

## 2020-05-13 NOTE — PROGRESS NOTES
Patient is being inactivated from the [facility] Monitoring Service. They have been non compliant in having the necessary lab work done for their anticoagulation therapy and have not responded to our letters or telephone calls.    Her last INR was done 10/29/19

## 2020-05-18 ENCOUNTER — MYC REFILL (OUTPATIENT)
Dept: INTERNAL MEDICINE | Facility: CLINIC | Age: 45
End: 2020-05-18

## 2020-05-18 ENCOUNTER — MYC REFILL (OUTPATIENT)
Dept: ENDOCRINOLOGY | Facility: CLINIC | Age: 45
End: 2020-05-18

## 2020-05-18 DIAGNOSIS — Z79.01 LONG TERM CURRENT USE OF ANTICOAGULANT THERAPY: ICD-10-CM

## 2020-05-18 DIAGNOSIS — E11.65 TYPE 2 DIABETES MELLITUS WITH HYPERGLYCEMIA, WITH LONG-TERM CURRENT USE OF INSULIN (H): ICD-10-CM

## 2020-05-18 DIAGNOSIS — Z79.4 TYPE 2 DIABETES MELLITUS WITH HYPERGLYCEMIA, WITH LONG-TERM CURRENT USE OF INSULIN (H): ICD-10-CM

## 2020-05-18 DIAGNOSIS — Z72.0 TOBACCO ABUSE: ICD-10-CM

## 2020-05-18 DIAGNOSIS — E11.42 TYPE 2 DIABETES MELLITUS WITH DIABETIC POLYNEUROPATHY, WITHOUT LONG-TERM CURRENT USE OF INSULIN (H): ICD-10-CM

## 2020-05-18 RX ORDER — DULAGLUTIDE 1.5 MG/.5ML
1.5 INJECTION, SOLUTION SUBCUTANEOUS
Qty: 6 ML | Refills: 3 | Status: SHIPPED | OUTPATIENT
Start: 2020-05-18 | End: 2020-08-11

## 2020-05-18 RX ORDER — NICOTINE 21 MG/24HR
1 PATCH, TRANSDERMAL 24 HOURS TRANSDERMAL EVERY 24 HOURS
Qty: 90 PATCH | Refills: 0 | Status: CANCELLED | OUTPATIENT
Start: 2020-05-18

## 2020-05-18 RX ORDER — INSULIN GLARGINE 300 U/ML
INJECTION, SOLUTION SUBCUTANEOUS
Qty: 30 ML | Refills: 3 | Status: CANCELLED | OUTPATIENT
Start: 2020-05-18

## 2020-05-18 RX ORDER — WARFARIN SODIUM 5 MG/1
TABLET ORAL
Qty: 90 TABLET | Refills: 0 | OUTPATIENT
Start: 2020-05-18

## 2020-05-18 NOTE — TELEPHONE ENCOUNTER
dulaglutide (TRULICITY) 1.5 MG/0.5ML pen   Last Written Prescription Date:  7/24/19  Last Fill Quantity: 6ml,   # refills: 3  Last Office Visit : 4/16/20  Future Office visit:  None    Routing refill request to provider for review/approval because: A1C, past due. Creat H ordered by other provider.

## 2020-06-01 ENCOUNTER — TELEPHONE (OUTPATIENT)
Dept: SLEEP MEDICINE | Facility: CLINIC | Age: 45
End: 2020-06-01

## 2020-06-02 ENCOUNTER — DOCUMENTATION ONLY (OUTPATIENT)
Dept: SLEEP MEDICINE | Facility: CLINIC | Age: 45
End: 2020-06-02

## 2020-06-02 NOTE — PROGRESS NOTES
Person Calling: BRAXTON  Relation to patient: SELF  Location (hospital/clinic name):   Call back number: [phone]  Is it okay to leave a detailed message?: yes or no  Fax number:  Reason for call: PT'S CPAP STOPPED WORKING -- SAID THAT IT STARTED MAKING A FUNNY NOISE AND THEN THE AIR STOPPED. SENDING NOTE TO IN HOUSE Last comment: Abigail Peralta 06/2/2020 8:39:11 AM 6/2/2020- - RETURNED CALL, LEFT VM TO CALL [phone].

## 2020-06-05 ENCOUNTER — TELEPHONE (OUTPATIENT)
Dept: ENDOCRINOLOGY | Facility: CLINIC | Age: 45
End: 2020-06-05

## 2020-06-05 DIAGNOSIS — Z79.4 TYPE 2 DIABETES MELLITUS WITH HYPERGLYCEMIA, WITH LONG-TERM CURRENT USE OF INSULIN (H): ICD-10-CM

## 2020-06-05 DIAGNOSIS — E11.65 TYPE 2 DIABETES MELLITUS WITH HYPERGLYCEMIA, WITH LONG-TERM CURRENT USE OF INSULIN (H): ICD-10-CM

## 2020-06-05 RX ORDER — INSULIN ASPART 100 [IU]/ML
INJECTION, SOLUTION INTRAVENOUS; SUBCUTANEOUS
Qty: 250 ML | Refills: 3 | Status: SHIPPED | OUTPATIENT
Start: 2020-06-05 | End: 2020-06-05

## 2020-06-05 RX ORDER — INSULIN ASPART 100 [IU]/ML
INJECTION, SOLUTION INTRAVENOUS; SUBCUTANEOUS
Qty: 250 ML | Refills: 3 | Status: SHIPPED | OUTPATIENT
Start: 2020-06-05 | End: 2020-08-12

## 2020-06-05 NOTE — TELEPHONE ENCOUNTER
[facility] Call Center    Phone Message    May a detailed message be left on voicemail: yes     Reason for Call: Other: Pt calling in regarding medication being all out and needing more. Pt is wanting to make sure that she gets a 90 day supply instead of just one month. Please follow up when available. THank you      Action Taken: Message routed to:  [facility] Diabetes    Travel Screening: Not Applicable                                                                        
NOVOLOG FLEXPEN 100 UNIT/ML soln      Last Written Prescription Date:  4-3-2020  Last Fill Quantity: 75ml,   # refills: 0  Last Office Visit : 4-  Future Office visit:  none    Routing refill request to provider for review/approval because:  Insulin - refilled per clinic        Kathleen M Doege RN        
Short Acting Insulin Protocol Jhgywa6106/05/2020 11:29 AM   HgbA1C in past 3 or 6 months     Lab order in place. Pt notified.   Linn Kelly RN on 6/5/2020 at 11:39 AM     
recognition program. Efforts were made to edit the dictations but occasionally words are mis-transcribed.)           SAMMY Crockett - KIRT  08/13/19 [id number]

## 2020-06-05 NOTE — TELEPHONE ENCOUNTER
Health Call Center    Phone Message    May a detailed message be left on voicemail: yes     Reason for Call: Medication Refill Request    Has the patient contacted the pharmacy for the refill? Yes   Name of medication being requested: NOVOLOG FLEXPEN 100 UNIT/ML soln      Provider who prescribed the medication: SHANIQUE Archibald  Pharmacy: [place], [place] - [address] [place] [address] [phone]   Date medication is needed: NOW - pt states she is out     Please call the pt today - The refill notice states someone called-notified her today but she states no one has contacted her. Will this med be refilled? Please contact the pt asap. Thanks.  I sent a skype mssg to the clinic that wasn't responded to.      Action Taken: Message routed to:  [facility] pop oden    Travel Screening: Not Applicable

## 2020-06-05 NOTE — TELEPHONE ENCOUNTER
KevinPricilla LEOPOLDO [phone]     Attempted to reach pt via phone, 2x, voicve mail box full, unable to leave message. [employer] message sent.  Linn Kelly RN on 6/5/2020 at 4:01 PM

## 2020-06-19 VITALS — WEIGHT: 293 LBS | HEIGHT: 67 IN | BODY MASS INDEX: 45.99 KG/M2

## 2020-06-19 ASSESSMENT — MIFFLIN-ST. JEOR: SCORE: 2895.26

## 2020-06-19 NOTE — PATIENT INSTRUCTIONS
Pricilla, update me via my chart in 1 week's time so I can look to see if your numbers start looking better.  I will be in the office on Thursday, please contact me for concerns.    your BMI is Body mass index is 76.74 kg/m .  Weight management is a personal decision.  If you are interested in exploring weight loss strategies, the following discussion covers the approaches that may be successful. Body mass index (BMI) is one way to tell whether you are at a healthy weight, overweight, or obese. It measures your weight in relation to your height.  A BMI of 18.5 to 24.9 is in the healthy range. A person with a BMI of 25 to 29.9 is considered overweight, and someone with a BMI of 30 or greater is considered obese. More than two-thirds of American adults are considered overweight or obese.  Being overweight or obese increases the risk for further weight gain. Excess weight may lead to heart disease and diabetes.  Creating and following plans for healthy eating and physical activity may help you improve your health.  Weight control is part of healthy lifestyle and includes exercise, emotional health, and healthy eating habits. Careful eating habits lifelong are the mainstay of weight control. Though there are significant health benefits from weight loss, long-term weight loss with diet alone may be very difficult to achieve- studies show long-term success with dietary management in less than 10% of people. Attaining a healthy weight may be especially difficult to achieve in those with severe obesity. In some cases, medications, devices and surgical management might be considered.  What can you do?  If you are overweight or obese and are interested in methods for weight loss, you should discuss this with your provider.     Consider reducing daily calorie intake by 500 calories.     Keep a food journal.     Avoiding skipping meals, consider cutting portions instead.    Diet combined with exercise helps maintain muscle while  optimizing fat loss. Strength training is particularly important for building and maintaining muscle mass. Exercise helps reduce stress, increase energy, and improves fitness. Increasing exercise without diet control, however, may not burn enough calories to loose weight.       Start walking three days a week 10-20 minutes at a time    Work towards walking thirty minutes five days a week     Eventually, increase the speed of your walking for 1-2 minutes at time    In addition, we recommend that you review healthy lifestyles and methods for weight loss available through the National Institutes of Health patient information sites:  http://win.niddk.nih.gov/publications/index.htm    And look into health and wellness programs that may be available through your health insurance provider, employer, local community center, or [employer].    Weight management plan: Patient was referred to their PCP to discuss a diet and exercise plan.

## 2020-06-19 NOTE — PROGRESS NOTES
"Have you been in contact with anyone with COVID-19 in the last 30 days No    Do you have any of the following symptoms:   Cough No  Fever No  Rash No  Shortness of breath No    Have you Traveled in the last 30 day? No  Have you been in contact with anyone who traveled in the last 30 day?   No     Pricilla Brown is a 45 year old female who is being evaluated via a billable video visit.      The patient has been notified of following:     \"This video visit will be conducted via a call between you and your physician/provider. We have found that certain health care needs can be provided without the need for an in-person physical exam.  This service lets us provide the care you need with a video conversation.  If a prescription is necessary we can send it directly to your pharmacy.  If lab work is needed we can place an order for that and you can then stop by our lab to have the test done at a later time.    Video visits are billed at different rates depending on your insurance coverage.  Please reach out to your insurance provider with any questions.    If during the course of the call the physician/provider feels a video visit is not appropriate, you will not be charged for this service.\"    Patient has given verbal consent for Video visit? Yes    Will anyone else be joining your video visit? No  {If patient encounters technical issues they should call [phone] :[id number]}    Person Calling: PRICILLA  Relation to patient: SELF  Location (hospital/clinic name):   Call back number: [phone]  Is it okay to leave a detailed message?: yes or no  Fax number:  Reason for call: PT'S CPAP STOPPED WORKING -- SAID THAT IT STARTED MAKING A FUNNY NOISE AND THEN THE AIR STOPPED. SENDING NOTE TO IN HOUSE Last comment: Abigail Peralta 06/2/2020 8:39:11 AM 6/2/2020- HERI- RETURNED CALL, LEFT VM TO CALL [phone].     Video-Visit Details    Type of service:  Video Visit    Video Start Time: {video visit start/end " "time:152948}11:30AM  Video End Time: {video visit start/end time:152948}    Originating Location (pt. Location): {video visit patient location:882851::\"Home\"}    Distant Location (provider location):  [place] [facility]     Platform used for Video Visit: {Virtual Visit Platforms:518002::\"AmWell\"}    {signature options:043240}     CC: review compliance and response to CPAP therapy with baseline severe obstructive sleep apnea in the setting of cardiomyopathy    History of present illnessHistory of severe obstructive sleep apnea with a polysomnogram, 03/2013, showing an AHI of 68.9.  Time less than 89% at 3.1 minutes.   Largest concern in past has been missed opportunities.  Overnight oximetry done on 05/23/2016 showed O2 sats less than 89%, 48 seconds. Wt 468#.    Previous visits:    3/17 visit:Use of APAP 29/30 days.  Average usage on days used 8 hours and 53 minutes, 90th percentile pressure 15.3 cm of water.Residual AHI is 0.9.  Daily usage shows some pancaking while she was sick with an upper respiratory as well as watching videos at nights and having CPAP on just in case she would fall asleep ( previous intervention to  all nights rest, but overextended) efforts be up and out of bed without cpap on while relaxing seem to result in is3umcl opporturnity.    Today's visit:4/18/18:  New machine, Door for filter broken off, Tube thing pops off, not getting therapy and not getting help \"noncompliant\".  Chart reviewed with pt.     Note from2/9/2020: reached out over my chart, read in 4/2020.  Download 25/30 days with use >=4 hrs, avg usage days used 8:37, apap 5-15cm h20, residual AHI 15.6, obstructive 3.3, unknown 8.6, hI 3.6   LEAK 65.5 L/MIN AT THE 95TH PERCENTILE, 95TH PERCENTILE PRESSURE 14.2CM H20  [facility] FORM SUPPLIED  WILL CHECK ON DATE OF LAST OV, IT'S MY UNDERSTANDING THAT SHE'S DUE FOR A VISIT, WE NEED TO KNOW CAUSE OF EXCESSIVE LEAK     6/26/2019 Cardiac Echo:  Technically " "difficult study.Extremely poor acoustic windows.  Limited information was obtained during study.  Left ventricular size is normal.  LVEF 43% based on biplane 2D tracing.  The inferior vena cava is normal.  Left Ventricle  Left ventricular size is normal. LVEF 43% based on biplane 2D tracing.     Right Ventricle  Right ventricular function cannot be assessed due to poor image quality.     Atria  The atria cannot be assessed.     Mitral Valve  The mitral valve cannot be assessed.    Aortic Valve  The aortic valve cannot be assessed.     Tricuspid Valve  The tricuspid valve cannot be assessed.     Pulmonic Valve  The pulmonic valve cannot be assessed.     Vessels  The aorta root cannot be assessed. The pulmonary artery cannot be assessed.  The inferior vena cava is normal.     Pericardium  Cannot assess pericardial effusion.      [facility]:Progress Notes        Person Calling: BRAXTON  Relation to patient: SELF  Location (hospital/clinic name):   Call back number: [phone]  Is it okay to leave a detailed message?: yes or no  Fax number:  Reason for call: PT'S CPAP STOPPED WORKING -- SAID THAT IT STARTED MAKING A FUNNY NOISE AND THEN THE AIR STOPPED. SENDING NOTE TO IN HOUSE Last comment: Abigail Peralta 06/2/2020 8:39:11 AM 6/2/2020- - RETURNED CALL, LEFT VM TO CALL [phone].         Overall, she rates the experience with PAP as *** (0 poor, 10 great). The mask {is/is not:457438::\"is\"} comfortable.  The mask is uncomfortable because of \"***\".  The mask {is/is not:131209::\"is\"} leaking {Delete if no leak complaint.:416248}.  She {is/is not:378607::\"is\"} snoring with the mask on. She {is/is not:531241::\"is\"} having gasp arousals.  She {is/is not:312298::\"is\"} having significant oral/nasal dryness. The pressure {is/is not:492112::\"is\"} comfortable. The pressure is uncomfortable because of \"***\".    Her PAP interface is {SLP Mask Interface List:010738}.    Bedtime is typically ***. Usually it takes about *** " "minutes to fall asleep with the mask on. Wake time is typically ***.  Patient is using PAP therapy *** hours per night. The patient is usually getting *** hours of sleep per night.    She {BlankBase Single Select.:906670::\"does\",\"does not\",\"***\"} feel rested in the morning.    3/31/2020-5/29/2020 Download  Auto-PAP 5 - 15 cmH2O 30 day usage data:    80% of days with > 4 hours of use. 2/60 days with no use.   Average use 7:25   Average leak 86.8 LPM at 95th percentile   CPAP 90% pressure 13.4cm h20  AHI 23.0 events per hour.(Obstructives 4.0 hypopnea 3.7, unknown 15.2)  Dialy usage graph show variation of use to 4 hrs to nearly 14 hrs with cpap in place      {SLP [place] Sleepiness Scale:536781::\"Total score - [place]: 17 (6/19/2020 10:04 AM)\"}  [place] Total Score 6/19/2020   Total score - [place] 17     *** ros            "

## 2020-06-22 ENCOUNTER — VIRTUAL VISIT (OUTPATIENT)
Dept: SLEEP MEDICINE | Facility: CLINIC | Age: 45
End: 2020-06-22
Payer: MEDICARE

## 2020-06-22 DIAGNOSIS — E66.01 CLASS 3 SEVERE OBESITY WITH BODY MASS INDEX (BMI) GREATER THAN OR EQUAL TO 70 IN ADULT, UNSPECIFIED OBESITY TYPE, UNSPECIFIED WHETHER SERIOUS COMORBIDITY PRESENT (H): ICD-10-CM

## 2020-06-22 DIAGNOSIS — E66.813 CLASS 3 SEVERE OBESITY WITH BODY MASS INDEX (BMI) GREATER THAN OR EQUAL TO 70 IN ADULT, UNSPECIFIED OBESITY TYPE, UNSPECIFIED WHETHER SERIOUS COMORBIDITY PRESENT (H): ICD-10-CM

## 2020-06-22 DIAGNOSIS — G47.19 EXCESSIVE DAYTIME SLEEPINESS: ICD-10-CM

## 2020-06-22 DIAGNOSIS — G47.33 OBSTRUCTIVE SLEEP APNEA (ADULT) (PEDIATRIC): Primary | ICD-10-CM

## 2020-06-22 PROCEDURE — 99214 OFFICE O/P EST MOD 30 MIN: CPT | Mod: 95 | Performed by: NURSE PRACTITIONER

## 2020-06-22 NOTE — PROGRESS NOTES
"Have you been in contact with anyone with COVID-19 in the last 30 days No    Do you have any of the following symptoms:   Cough No  Fever No  Rash No  Shortness of breath No    Have you Traveled in the last 30 day? No  Have you been in contact with anyone who traveled in the last 30 day?   No     Pricilla Brown is a 45 year old female who is being evaluated via a billable video visit.      The patient has been notified of following:     \"This video visit will be conducted via a call between you and your physician/provider. We have found that certain health care needs can be provided without the need for an in-person physical exam.  This service lets us provide the care you need with a video conversation.  If a prescription is necessary we can send it directly to your pharmacy.  If lab work is needed we can place an order for that and you can then stop by our lab to have the test done at a later time.    Video visits are billed at different rates depending on your insurance coverage.  Please reach out to your insurance provider with any questions.    If during the course of the call the physician/provider feels a video visit is not appropriate, you will not be charged for this service.\"    Patient has given verbal consent for Video visit? Yes    Will anyone else be joining your video visit? No      Person Calling: PRICILLA  Relation to patient: SELF  Location (hospital/clinic name):   Call back number: [phone]  Is it okay to leave a detailed message?: yes or no  Fax number:  Reason for call: PT'S CPAP STOPPED WORKING -- SAID THAT IT STARTED MAKING A FUNNY NOISE AND THEN THE AIR STOPPED. SENDING NOTE TO IN HOUSE Last comment: Abigail Peralta 06/2/2020 8:39:11 AM 6/2/2020- HERI- RETURNED CALL, LEFT  TO CALL [phone].     Video-Visit Details    Type of service:  Video Visit    Video Start Time: 11:30AM  Video End Time: 1200pm    Originating Location (pt. Location): Home    Distant Location (provider " "location):  [facility] [place]     Platform used for Video Visit: YOCASTA Paz CNP     CC: review compliance and response to CPAP therapy with baseline severe obstructive sleep apnea in the setting of cardiomyopathy    History of present illness:severe obstructive sleep apnea with a polysomnogram, 03/2013, showing an AHI of 68.9.  Time less than 89% at 3.1 minutes.   Largest concern in past has been missed opportunities.  Overnight oximetry done on 05/23/2016 showed O2 sats less than 89%, 48 seconds. Wt 468#.    Previous visits:    3/17 visit:Use of APAP 29/30 days.  Average usage on days used 8 hours and 53 minutes, 90th percentile pressure 15.3 cm of water.Residual AHI is 0.9.  Daily usage shows some pancaking while she was sick with an upper respiratory as well as watching videos at nights and having CPAP on just in case she would fall asleep ( previous intervention to  all nights rest, but overextended) efforts be up and out of bed without cpap on while relaxing seem to result in [id number] opporturnity.    4/18/18 visit:  New machine, Door for filter broken off, Tube thing pops off, not getting therapy and not getting help \"noncompliant\".  Chart reviewed with pt.     Note from2/9/2020: reached out over my chart, read in 4/2020 on my chart  Download 25/30 days with use >=4 hrs, avg usage days used 8:37, apap 5-15cm h20, residual AHI 15.6, obstructive 3.3, unknown 8.6, hI 3.6   LEAK 65.5 L/MIN AT THE 95TH PERCENTILE, 95TH PERCENTILE PRESSURE 14.2CM H20  [facility] FORM SUPPLIED  WILL CHECK ON DATE OF LAST OV, IT'S MY UNDERSTANDING THAT SHE'S DUE FOR A VISIT, WE NEED TO KNOW CAUSE OF EXCESSIVE LEAK     6/26/2019 Cardiac Echo:  Technically difficult study.Extremely poor acoustic windows.  Limited information was obtained during study.  Left ventricular size is normal.  LVEF 43% based on biplane 2D tracing.  The inferior vena cava is normal.  Left Ventricle  Left " "ventricular size is normal. LVEF 43% based on biplane 2D tracing.   Right Ventricle  Right ventricular function cannot be assessed due to poor image quality.     Atria  The atria cannot be assessed.     Mitral Valve  The mitral valve cannot be assessed.    Aortic Valve  The aortic valve cannot be assessed.     Tricuspid Valve  The tricuspid valve cannot be assessed.     Pulmonic Valve  The pulmonic valve cannot be assessed.     Vessels  The aorta root cannot be assessed. The pulmonary artery cannot be assessed.  The inferior vena cava is normal.     Pericardium  Cannot assess pericardial effusion.      FVHME:Progress Notes        Person Calling: BRAXTON  Relation to patient: SELF  Location (hospital/clinic name):   Call back number: [phone]  Is it okay to leave a detailed message?: yes or no  Fax number:  Reason for call: PT'S CPAP STOPPED WORKING -- SAID THAT IT STARTED MAKING A FUNNY NOISE AND THEN THE AIR STOPPED. SENDING NOTE TO IN HOUSE Last comment: Abigail Peralta 06/2/2020 8:39:11 AM 6/2/2020- - RETURNED CALL, LEFT VM TO CALL [phone].       6/2/2020: hose holes, mask too small given on 6/2/2020(since 6/2/2020), 3 days without cpap on loaner from 6/2/2020    Overall, she rates the experience with PAP as 4-5 (0 poor, 10 great). The mask is not comfortable-squeeze face.  The mask is uncomfortable because of \"too small\".  The mask is leaking .  She is snoring with the mask on. She is having gasp arousals.  She is having significant oral/nasal dryness. The pressure is not comfortable-because having events \".    Her PAP interface is Full Face Mask    Bedtime is typically 3-4A. Usually it takes about <10  minutes to fall asleep with the mask on. Wake time is typically frequently.  Patient is using PAP therapy 4-14 hours per night. The patient is usually getting 4-14 hours of sleep per night.    She does not feel rested in the morning.    3/31/2020-5/29/2020 Download  Completed prior to the " carlos  Auto-PAP 5 - 15 cmH2O 30 day usage data:    80% of days with > 4 hours of use. 2/60 days with no use.   Average use 7:25   Average leak 86.8 LPM at 95th percentile -  CPAP 90% pressure 13.4cm h20  AHI 23.0 events per hour.(Obstructives 4.0 hypopnea 3.7, unknown 15.2)  Dialy usage graph show variation of use to 4 hrs to nearly 14 hrs with cpap in place    ROS:   FBG: out of wack:  AF in and out  BP okTotal score - Hinkley: 17 (6/19/2020 10:04 AM)  Hinkley Total Score 6/19/2020   Total score - Hinkley 17               Allergies:    Allergies   Allergen Reactions     Penicillins Other (See Comments)     CHILDHOOD ALLERGY     Ibuprofen Sodium Hives and Rash       Medications:    Current Outpatient Medications   Medication Sig Dispense Refill     acetaminophen (TYLENOL) 325 MG tablet Take 2 tablets (650 mg) by mouth 3 times daily as needed for mild pain or fever (total acetaminophen dose should not exceed 3000 mg per day) 180 tablet 0     albuterol (PROAIR HFA/PROVENTIL HFA/VENTOLIN HFA) 108 (90 Base) MCG/ACT inhaler Inhale 2 puffs into the lungs every 6 hours as needed for shortness of breath / dyspnea 75 g 0     bisacodyl (DULCOLAX) 10 MG suppository Place 1 suppository (10 mg) rectally daily as needed for constipation 6 suppository 0     blood glucose (ACCU-CHEK RON PLUS) test strip Use to test blood sugar 4  times daily or as directed. 360 each 3     blood glucose (NO BRAND SPECIFIED) lancets standard USE TO CHECK  blood sugar fasting each am  prelunch and predinner daily OR AS DIRECTED Call clinic to schedule MD APPT. 400 each 3     blood glucose (NO BRAND SPECIFIED) test strip Use to test blood sugars 3 times daily or as directed 400 strip 3     blood glucose monitoring (IBG STAR) meter device kit -PLEASE GIVE PATIENT A DEVICE HER INSURANCE WILL COVER- 1 kit 0     blood glucose monitoring (NO BRAND SPECIFIED) meter device kit Use to test blood sugar 3 times daily or as directed. 1 kit 1     bumetanide  (BUMEX) 1 MG tablet Take 5 tablets (5 mg) by mouth 2 times daily 300 tablet 11     buPROPion (WELLBUTRIN SR) 100 MG 12 hr tablet Take 1 tablet (100 mg) by mouth 2 times daily 180 tablet 0     capsaicin (ZOSTRIX) 0.025 % external cream Apply 1 g topically 3 times daily as needed 1 Tube 0     ciclopirox (LOPROX) 0.77 % cream Apply topically 2 times daily To feet and toenails. 90 g 6     ciclopirox (PENLAC) 8 % external solution Apply topically daily To toenails. 6.6 mL 0     clotrimazole (LOTRIMIN) 1 % external cream Apply topically 2 times daily as needed (skin irritation) 30 g 0     colchicine (COLCYRS) 0.6 MG tablet Take 1-2 tablets (0.6-1.2 mg) by mouth daily as needed for moderate pain 180 tablet 0     diclofenac (VOLTAREN) 1 % topical gel Apply 4 grams to knees or 2 grams to hands four times daily using enclosed dosing card. 100 g 1     dulaglutide (TRULICITY) 1.5 MG/0.5ML pen Inject 1.5 mg Subcutaneous every 7 days 6 mL 3     DULoxetine (CYMBALTA) 20 MG capsule Take 1 capsule (20 mg) by mouth 2 times daily 60 capsule 0     Efinaconazole 10 % SOLN Externally apply topically daily To toenails. 8 mL 11     empagliflozin (JARDIANCE) 25 MG TABS tablet Take 1 tablet (25 mg) by mouth daily 90 tablet 3     gabapentin (NEURONTIN) 300 MG capsule Take 2 capsules (600 mg) by mouth 2 times daily 360 capsule 0     hydrALAZINE (APRESOLINE) 25 MG tablet Take 50-75 mg by mouth Take 2 tabs (50 mg) in am, 2 tabs (50 mg) midday, and 3 tabs (75 mg) in pm daily  450 tablet 3     insulin aspart (NOVOLOG FLEXPEN) 100 UNIT/ML pen INJECT 75u WITH MEALS PLUS CORRECTION. APPROX 250u 24 daily. Lab draw needed. Please contact nearest [facility] lab to schedule. 250 mL 3     insulin glargine U-300 (TOUJEO SOLOSTAR) 300 UNIT/ML (1 units dial) pen Inject 190 units subcutaneous each am. 30 mL 3     insulin pen needle (BD RIVER U/F) 32G X 4 MM miscellaneous Use 6 daily or as directed 600 each 3     isosorbide dinitrate (ISORDIL) 20 MG tablet  Take 1 tablet (20 mg) by mouth 3 times daily 270 tablet 1     levothyroxine (SYNTHROID/LEVOTHROID) 200 MCG tablet TAKE ONE TABLET BY MOUTH DAILY MONDAY THROUGH SATURDAY, AND TWO TABLETS ON SUNDAYS 102 tablet 1     metolazone (ZAROXOLYN) 2.5 MG tablet Take 1 tablet (2.5 mg) by mouth 2 times daily Take 1/2 hour prior to torsemide 180 tablet 3     metoprolol succinate ER (TOPROL-XL) 200 MG 24 hr tablet Take 1 tablet (200 mg) by mouth daily 90 tablet 2     nicotine (NICODERM CQ) 14 MG/24HR 24 hr patch Place 1 patch onto the skin every 24 hours 90 patch 0     nystatin (MYCOSTATIN) 168432 UNIT/GM external cream Apply topically 2 times daily To toenails 90 g 6     order for DME Left foot 1 Units 0     order for DME Equipment being ordered:  0383-9363 $92   Plantar, fasciitis, LG, night splint 1 each 0     order for DME Equipment being ordered: Challenger Wide walker if available - patient needs seat, basket and brakes. 1 each 0     ORDER FOR DME Use your CPAP device as directed by your provider. Pressure change to min 13 max 18cwp 1 each 99     oxyCODONE-acetaminophen (PERCOCET) 5-325 MG per tablet Take 1 tablet by mouth every 8 hours as needed for moderate to severe pain (try to limit use, no further prescriptions until seen in pain clinic) 60 tablet 0     polyethylene glycol (MIRALAX/GLYCOLAX) powder        potassium chloride ER (KLOR-CON M) 20 MEQ CR tablet Take 2 tablets (40 mEq) by mouth 4 times daily 240 tablet 6     Respiratory Therapy Supplies (NEBULIZER COMPRESSOR) KIT 1 Device 4 times daily as needed. 1 kit 3     senna (SENOKOT) 8.6 MG tablet Take 1 tablet by mouth 2 times daily 45 tablet 0     spironolactone (ALDACTONE) 50 MG tablet Take 1 tablet (50 mg) by mouth daily 90 tablet 1     tiotropium (SPIRIVA HANDIHALER) 18 MCG inhalation capsule Inhale contents of one capsule daily. 30 capsule 1     topiramate (TOPAMAX) 50 MG tablet Take 3 tablets (150 mg) by mouth daily 90 tablet 1     morphine (MS CONTIN)  15 MG 12 hr tablet Take 15 mg by mouth daily as needed         Problem List:  Patient Active Problem List    Diagnosis Date Noted     Class 3 obesity due to excess calories with serious comorbidity and body mass index (BMI) greater than or equal to 70 in adult 07/20/2018     Priority: Medium     Foot pain 04/06/2017     Priority: Medium     Chest pain 02/01/2017     Priority: Medium     Cellulitis 10/14/2016     Priority: Medium     Atrial fibrillation (H) [I48.91] 06/06/2016     Priority: Medium     Long-term (current) use of anticoagulants [Z79.01] 06/06/2016     Priority: Medium     Plantar fasciitis 09/07/2015     Priority: Medium     Neuropathic pain of lower extremity 09/07/2015     Priority: Medium     Peritonsillar abscess 09/30/2014     Priority: Medium     Asthma 07/16/2013     Priority: Medium     Problem list name updated by automated process. Provider to review       Azotemia 04/30/2013     Priority: Medium     SOB (shortness of breath) 03/25/2013     Priority: Medium     Hypothyroidism following radioiodine therapy 03/07/2013     Priority: Medium     JYOTI (obstructive sleep apnea) CPAP Min Pressure of 13 and Max Pressure of 18 03/05/2013     Priority: Medium     3/4/13 PSG - AHI 62, started on APAP 13-18 with FFM       Chronic diastolic heart failure (H) 02/15/2013     Priority: Medium     CORE Pt  EDW is about 437#                 Chronic anticoagulation for a-fib 02/15/2013     Priority: Medium     INR's followed by coumadin clinic at        Morbid obesity (H) 01/30/2013     Priority: Medium     Diabetes mellitus, type 2 (H) 01/30/2013     Priority: Medium     Dilated cardiomyopathy (H) 01/08/2013     Priority: Medium     Right Heart Procedure Note:  July 20, 2015  Indication: assessment of the filling pressures   Procedure   Access: 7 Fr sheath without complications in the right IJ  Findings   RA: 9/10/6  RV 30/7  PA 30/15/22  PCWP 16/16/14  Catarina CO 4.95 (1.69) TD CO 7.3 (2.5)   TPR 4.45 PVR  "1.62  PaSat 59.3 Hb 13.4  Impression   - Normal RV and LV filling pressures.   - Cardiomyopathy       Right Heart Cath: 5/1/13  RHC, acute exacerbation of HF was ruled out yesterday. (RA 11, PA s29 ED12, PA 17 and PCWP 14.) Since last RHC in [place] the Cardiac index (Catarina) is up from 1.6 to1.9.  ECHO: 3/26/2013  Interpretation Summary  Moderate left ventricular dilation is present. The Ejection Fraction is   estimated at 40-45%. Pulmonary artery systolic pressure cannot be assessed.   The inferior vena cava is normal. Technically difficult study. Poor acoustic   windows.       Chronic atrial fibrillation (HCC) 01/05/2013     Priority: Medium        Past Medical/Surgical History:  Past Medical History:   Diagnosis Date     A-fib (H) 2011    on coumadin since 1/13     Asthma     as a kid     Chest pain 2/1/2017     Chronic anticoagulation for a-fib 2/15/2013    INR's followed by coumadin clinic at      Diabetes mellitus (H) 2012     Diastolic heart failure 2/15/2013     Dilated cardiomyopathy (H) 1/8/2013     HTN (hypertension)      Hyperthyroidism     [place], s/p I131 1/13, now on prednisone and methimazole     Morbid obesity (H)      Pulmonary embolism (H) 1/12    hospitalized in [place], on lovenox/coumadin for a few months but stopped, hypercoag w/u neg per pt     Sleep apnea     using CPAP     No past surgical history on file.        Physical Examination:  Vitals: Ht 1.702 m (5' 7\")   Wt (!) 222.3 kg (490 lb)   BMI 76.74 kg/m    BMI= Body mass index is 76.74 kg/m .    Constitutional: . Awake, alert, cooperative, dressed casually, good eye contact, comfortably sitting in a bed,  in no apparent distress  Mood: euthymic; affect congruent with full range and intensity.  Attention/Concentration:  Normal   Neurologic: Alert, oriented x3, no focal neurological deficit, cranial nerves grossly normal         [place] Total Score 6/19/2020   Total score - [place] 17     Assessment plan: Prior to loaner it appears that there " was excessive leak and significant residual obstructive events.  With new mask, which is too small around 6/2/2020 at the time of the evaluation by [facility], she indicates that there are more events and she is having a difficult time with sleep fragmentation.  Since she has significance severe sleep apnea, I have given her the number of [facility] medical to secure a mask that does fit, and to determine where we are at with regard to the condition of her machine.    1.  Obstructive sleep apnea: Severe, currently symptomatic on a loaner machine and a mask that is too small.  Patient encouraged to contact [facility] to directly.  I will send a note to RT Maria Isabel.  2.  Obesity: Morbid obesity: Encouraged Pricilla to increase her activity level once her sleep apnea is improved.  She is down 8-9 pounds.  3.  Excessive daytime sleepiness: [place] score is increased and some nights she reports he is only able to sleep on this machine for about 4 hours.    Pricilla and I will both contact [facility] with concerns with the length of time that she has had nights of insufficient sleep on her CPAP and what appears to be insufficient coverage of her JYOTI.  Awaiting word on repair or new APAP, then download to determine adequacy of coverage, and obtaining a mask that will fit.    Time spent with patient was 30 minutes with a scheduled video visit.  Further continuity of care reaching out to [facility] with above concerns.

## 2020-06-24 DIAGNOSIS — Z79.01 LONG TERM CURRENT USE OF ANTICOAGULANT THERAPY: ICD-10-CM

## 2020-06-24 RX ORDER — WARFARIN SODIUM 5 MG/1
TABLET ORAL
Qty: 90 TABLET | Refills: 0 | OUTPATIENT
Start: 2020-06-24

## 2020-07-24 DIAGNOSIS — B37.31 YEAST INFECTION OF THE VAGINA: ICD-10-CM

## 2020-07-27 DIAGNOSIS — I50.32 CHRONIC DIASTOLIC HEART FAILURE (H): ICD-10-CM

## 2020-07-29 ENCOUNTER — TELEPHONE (OUTPATIENT)
Dept: ENDOCRINOLOGY | Facility: CLINIC | Age: 45
End: 2020-07-29

## 2020-07-29 RX ORDER — FLUCONAZOLE 150 MG/1
TABLET ORAL
Qty: 1 TABLET | Refills: 0 | Status: SHIPPED | OUTPATIENT
Start: 2020-07-29 | End: 2020-08-28

## 2020-07-29 RX ORDER — METOPROLOL SUCCINATE 100 MG/1
150 TABLET, EXTENDED RELEASE ORAL DAILY
Qty: 135 TABLET | Refills: 3 | OUTPATIENT
Start: 2020-07-29

## 2020-07-29 NOTE — TELEPHONE ENCOUNTER
FLUCONAZOLE 150MG TABS Take 1 tablet (150 mg) by mouth once for 1 dose   Last Written Prescription Date:  4/16/20  Last Fill Quantity: 1 ,   # refills: 0  Last Office Visit : 4/16/20  Future Office visit:  None    Routing refill request to provider for review/approval because:  Drug not on the [facility] refill protocol

## 2020-07-29 NOTE — TELEPHONE ENCOUNTER
Central Prior Authorization Team   Phone: [phone]      PA Initiation    Medication: Toujeo 300 UNITS/ML SOLOSTAR-PA initiated  Insurance Company: Kalen Fontaine - Phone [phone] Fax [phone]  Pharmacy Filling the Rx: [facility] [place], [place] - [address] [place] [address] [id number]  Filling Pharmacy Phone: [phone]  Filling Pharmacy Fax:    Start Date: 7/29/2020

## 2020-07-29 NOTE — TELEPHONE ENCOUNTER
[facility] Prior Authorization Team Request    Medication: Toujeo 300 UNITS/ML SOLOSTAR  Dosing:   NDC (required for Medicaid members):     Insurance   BIN: [id number]  PCN: [id number]  Grp: [id number]  ID: [id number]     CoverMyMeds Key (if applicable):     Additional documentation:     Filling Pharmacy: [facility] [place]   Phone Number: [phone]  Contact: P PHARM ESE PA (P [id number]) please send all responses to this pool.  Pharmacy NPI (required for Medicaid members):

## 2020-07-29 NOTE — TELEPHONE ENCOUNTER
"metoprolol succinate ER (TOPROL-XL) 100 MG 24 hr tablet         Last Written Prescription Date:  1/17/2019  Last Fill Quantity: 135,   # refills: 2  Last Office Visit : 4/9/20 [place]  Future Office visit:  Recommended 4 weeks/ CORE     Denied  Drug dosage not active on patient's medication list. Pharmacy notified by phone today.Reviewed previous and current dosage and # of refills.    Last noted in chart increase on 3/13/2019/ Pauline:BB yes - Toprol increased to 200 mg daily today\"  Active script  metoprolol succinate ER (TOPROL-XL) 200 MG 24 hr tablet  90 tablet  2  4/6/2020   No    Sig - Route: Take 1 tablet (200 mg) by mouth daily - Oral             "

## 2020-07-30 NOTE — TELEPHONE ENCOUNTER
Prior Authorization Approval    Authorization Effective Date: 4/30/2020  Authorization Expiration Date: 7/29/2021  Medication: Toujeo 300 UNITS/ML SOLOSTAR-PA approved  Approved Dose/Quantity:   Reference #:     Insurance Company: [employer] - Phone [phone] Fax [phone]  Expected CoPay:       CoPay Card Available:      Foundation Assistance Needed:    Which Pharmacy is filling the prescription (Not needed for infusion/clinic administered): [facility] [place], [place] - [address] [place] [address] [id number]  Pharmacy Notified: Yes  Patient Notified: No-Pharmacy will contact

## 2020-08-04 NOTE — LETTER
10/29/2019      RE: Pricilla Brown  [address]  [facility] [address]     Dear Colleague,    Thank you for the opportunity to participate in the care of your patient, Pricilla Brown, at the [facility] HEART [facility] at [facility]. Please see a copy of my visit note below.    HPI: 44 yr old female presents to [facility] clinic for follow up of NICM with an EF of 43%. She was last seen by Rajwinder Heaton NP on 7/23/19, She has missed several follow up apt's since that time. She states she feels SOB with minimal activity and that she feels like she is retaining water. Her sleep varies from sleeping in a chair to lying flat without difficulty. She does wear her CPAP.  She hasn't been using metolazone as she ran out of this medication. Her appetite is fair. She does complain of early satiety. She has been watching her salt intake.  She reports her blood glucose has been better - with very few readings greater than 200,.     PMH: morbid obesity  PAF - on Warfarin  DM2   Tobacco use ongoing    PAST MEDICAL HISTORY:  Past Medical History:   Diagnosis Date     A-fib (H) 2011    on coumadin since 1/13     Asthma     as a kid     Chest pain 2/1/2017     Chronic anticoagulation for a-fib 2/15/2013    INR's followed by coumadin clinic at      Diabetes mellitus (H) 2012     Diastolic heart failure 2/15/2013     Dilated cardiomyopathy (H) 1/8/2013     HTN (hypertension)      Hyperthyroidism     Graves, s/p I131 1/13, now on prednisone and methimazole     Morbid obesity (H)      Pulmonary embolism (H) 1/12    hospitalized in [place], on lovenox/coumadin for a few months but stopped, hypercoag w/u neg per pt     Sleep apnea     using CPAP       FAMILY HISTORY:  Family History   Problem Relation Age of Onset     Thyroid Disease Mother         Grave's D     Diabetes Mother      Heart Disease Mother      Cerebrovascular Disease Mother         dec. 56 yo     Hypertension Mother      Heart Disease  14 skin prick tests placed on patient forearms by Dr. Mock.   Sister         Heart Murmur     Diabetes Sister      Heart Disease Father         dec in his 30s, MI     Psychotic Disorder Brother         Bipolar     Diabetes Brother      Thyroid Disease Brother         Hyper Thyroid     Heart Disease Brother      Thyroid Disease Sister         Hyper Thyroid     Thyroid Disease Sister         Hyper Thyroid     Thyroid Disease Sister         Mental Health Problems     Thyroid Disease Maternal Aunt      Thyroid Disease Maternal Uncle      Cancer No family hx of      Glaucoma No family hx of      Macular Degeneration No family hx of        SOCIAL HISTORY:  Social History     Socioeconomic History     Marital status: Single     Spouse name: Not on file     Number of children: Not on file     Years of education: Not on file     Highest education level: Not on file   Occupational History     Not on file   Social Needs     Financial resource strain: Not on file     Food insecurity:     Worry: Not on file     Inability: Not on file     Transportation needs:     Medical: Not on file     Non-medical: Not on file   Tobacco Use     Smoking status: Light Tobacco Smoker     Packs/day: 0.25     Years: 13.00     Pack years: 3.25     Types: Cigarettes     Smokeless tobacco: Never Used     Tobacco comment: down to 5 cigs   Substance and Sexual Activity     Alcohol use: No     Drug use: No     Sexual activity: Yes     Partners: Male     Birth control/protection: Condom   Lifestyle     Physical activity:     Days per week: Not on file     Minutes per session: Not on file     Stress: Not on file   Relationships     Social connections:     Talks on phone: Not on file     Gets together: Not on file     Attends Evangelical service: Not on file     Active member of club or organization: Not on file     Attends meetings of clubs or organizations: Not on file     Relationship status: Not on file     Intimate partner violence:     Fear of current or ex partner: Not on file     Emotionally abused: Not on file      Physically abused: Not on file     Forced sexual activity: Not on file   Other Topics Concern     Parent/sibling w/ CABG, MI or angioplasty before 65F 55M? Not Asked   Social History Narrative    Single, no children        Gyn:        Last pap several years ago, no abnormal    No STIs            Patient is single.  She is no longer working.  She used to work in the area of customer service.  She is currently living with her sister in [place], [place].  She has no pets.  Patient has smoked a half pack of cigarettes a day for the past 10 plus years.  She states that she is down to 5 cigarettes a day with the aid of Wellbutrin.  She does not smoke cigars, pipes or chew tobacco.  She has 1 cup of coffee in the morning.  She does not drink alcohol.  Patient does not exercise.        CURRENT MEDICATIONS:  Current Outpatient Medications   Medication Sig Dispense Refill     acetaminophen (TYLENOL) 325 MG tablet Take 2 tablets (650 mg) by mouth 3 times daily as needed for mild pain or fever (total acetaminophen dose should not exceed 3000 mg per day) 180 tablet 0     albuterol (PROAIR HFA/PROVENTIL HFA/VENTOLIN HFA) 108 (90 Base) MCG/ACT inhaler Inhale 2 puffs into the lungs every 6 hours as needed for shortness of breath / dyspnea 75 g 0     bisacodyl (DULCOLAX) 10 MG suppository Place 1 suppository (10 mg) rectally daily as needed for constipation 6 suppository 0     blood glucose (ACCU-CHEK RON PLUS) test strip Use to test blood sugar 4  times daily or as directed. 360 each 3     blood glucose (NO BRAND SPECIFIED) lancets standard USE TO CHECK  blood sugar fasting each am  prelunch and predinner daily OR AS DIRECTED Call clinic to schedule MD APPT. 400 each 3     blood glucose (NO BRAND SPECIFIED) test strip Use to test blood sugars 3 times daily or as directed 400 strip 3     blood glucose monitoring (IBG STAR) meter device kit -PLEASE GIVE PATIENT A DEVICE HER INSURANCE WILL COVER- 1 kit 0     blood  glucose monitoring (NO BRAND SPECIFIED) meter device kit Use to test blood sugar 3 times daily or as directed. 1 kit 1     bumetanide (BUMEX) 1 MG tablet Take 5 tablets (5 mg) by mouth 2 times daily 300 tablet 11     buPROPion (WELLBUTRIN SR) 100 MG 12 hr tablet Take 1 tablet (100 mg) by mouth 2 times daily 180 tablet 0     capsaicin (ZOSTRIX) 0.025 % external cream Apply 1 g topically 3 times daily as needed 1 Tube 0     ciclopirox (LOPROX) 0.77 % cream Apply topically 2 times daily To feet and toenails. 90 g 6     ciclopirox (PENLAC) 8 % external solution Apply topically daily To toenails. 6.6 mL 0     clotrimazole (LOTRIMIN) 1 % external cream Apply topically 2 times daily as needed (skin irritation) 30 g 0     colchicine (COLCYRS) 0.6 MG tablet Take 1-2 tablets (0.6-1.2 mg) by mouth daily as needed for moderate pain 180 tablet 0     diclofenac (VOLTAREN) 1 % topical gel Apply 4 grams to knees or 2 grams to hands four times daily using enclosed dosing card. 100 g 1     dulaglutide (TRULICITY) 1.5 MG/0.5ML pen Inject 1.5 mg Subcutaneous every 7 days 6 mL 3     DULoxetine (CYMBALTA) 20 MG capsule Take 1 capsule (20 mg) by mouth 2 times daily 60 capsule 0     Efinaconazole 10 % SOLN Externally apply topically daily To toenails. 8 mL 11     empagliflozin (JARDIANCE) 25 MG TABS tablet Take 1 tablet (25 mg) by mouth daily 90 tablet 3     gabapentin (NEURONTIN) 300 MG capsule TAKE TWO CAPSULES BY MOUTH TWICE A  capsule 0     hydrALAZINE (APRESOLINE) 25 MG tablet Take 50-75 mg by mouth Take 2 tabs (50 mg) in am, 2 tabs (50 mg) midday, and 3 tabs (75 mg) in pm daily  450 tablet 3     insulin glargine U-300 (TOUJEO) 300 UNIT/ML injection Inject 170 units SQ each am. 170 mL 3     insulin pen needle (BD RIVER U/F) 32G X 4 MM miscellaneous Use 6 daily or as directed 600 each 3     levothyroxine (SYNTHROID/LEVOTHROID) 200 MCG tablet TAKE ONE TABLET BY MOUTH EVERY DAY MONDAY THROUGH SATURDAY AND TAKE TWO TABLETS ON  SUNDAYS 102 tablet 1     metolazone (ZAROXOLYN) 2.5 MG tablet Take 1 tablet (2.5 mg) by mouth 2 times daily Take 1/2 hour prior to torsemide 180 tablet 3     metoprolol succinate ER (TOPROL-XL) 200 MG 24 hr tablet Take 1 tablet (200 mg) by mouth daily 90 tablet 3     morphine (MS CONTIN) 15 MG 12 hr tablet Take 15 mg by mouth daily as needed       nicotine ([employer] NICOTINE) 14 MG/24HR 24 hr patch Place 1 patch onto the skin every 24 hours 30 patch 0     NOVOLOG FLEXPEN 100 UNIT/ML soln Inject 70 units with meals plus correction. Pt uses approx 200 units in 24 hrs. 180 mL 3     nystatin (MYCOSTATIN) 684112 UNIT/GM external cream Apply topically 2 times daily To toenails 90 g 6     order for DME Left foot 1 Units 0     order for DME Equipment being ordered:  1796-6608 $92   Plantar, fasciitis, LG, night splint 1 each 0     order for DME Equipment being ordered: Challenger Wide walker if available - patient needs seat, basket and brakes. 1 each 0     ORDER FOR DME Use your CPAP device as directed by your provider. Pressure change to min 13 max 18cwp 1 each 99     oxyCODONE-acetaminophen (PERCOCET) 5-325 MG per tablet Take 1 tablet by mouth every 8 hours as needed for moderate to severe pain (try to limit use, no further prescriptions until seen in pain clinic) 60 tablet 0     polyethylene glycol (MIRALAX/GLYCOLAX) powder        potassium chloride ER (K-DUR/KLOR-CON M) 20 MEQ CR tablet Take 2 tablets (40 mEq) by mouth 4 times daily 240 tablet 11     Respiratory Therapy Supplies (NEBULIZER COMPRESSOR) KIT 1 Device 4 times daily as needed. 1 kit 3     senna (SENOKOT) 8.6 MG tablet Take 1 tablet by mouth 2 times daily 45 tablet 0     spironolactone (ALDACTONE) 50 MG tablet Take 1 tablet (50 mg) by mouth daily 30 tablet 3     tiotropium (SPIRIVA HANDIHALER) 18 MCG inhalation capsule Inhale contents of one capsule daily. 30 capsule 1     topiramate (TOPAMAX) 50 MG tablet Take 3 tablets (150 mg) by mouth daily 90 tablet 1  "    warfarin (COUMADIN) 5 MG tablet Take 20mg on F and 15mg on MTuWThSatSun, or as directed by the [facility] at the [facility] 280 tablet 1     warfarin (COUMADIN) 5 MG tablet Take 20mgs on Fridays and 15mgs on all other days or as directed by the Coumadin Clinic 100 tablet 3     warfarin (COUMADIN) 5 MG tablet TAKE 3 TABLETS BY MOUTH ON MON, TUES, THURS, SAT, AND SUN AND 4 TABS ON WED AND FRI. 170 tablet 1     warfarin (COUMADIN) 5 MG tablet Eows26ct on MTuThSatSun, and 20mg on WF, or as directed by the Medication Monitoring [facility] at the [facility] 170 tablet 1       ROS:   Constitutional: No fever, chills, or sweats. No weight gain/loss.   ENT: No visual disturbance, ear ache, epistaxis, sore throat.   Allergies/Immunologic: Negative.   Respiratory: No cough, hemoptysis.   Cardiovascular: As per HPI.   GI: No nausea, vomiting, hematemesis, melena, or hematochezia.   : No urinary frequency, dysuria, or hematuria.   Integument: Negative.   Psychiatric: Negative.   Neuro: Negative.   Endocrinology: Negative.   Musculoskeletal: Negative.    EXAM:  /86 (BP Location: Left arm, Patient Position: Chair, Cuff Size: Adult Large)   Pulse 94   Ht 1.702 m (5' 7\")   Wt (!) 226.7 kg (499 lb 11.2 oz)   SpO2 100%   BMI 78.26 kg/m       General: appears comfortable, alert and articulate; morbidly obese black female  Head: normocephalic, atraumatic  Eyes: anicteric sclera, EOMI  Neck: no adenopathy  Orophyarynx: moist mucosa, no lesions, dentition intact  Heart: regular, S1/S2, no murmur, gallop, rub, estimated JVP unable to assess due to body habitus  Lungs: clear, no rales or wheezing- decreased breath sounds throughout  Abdomen: soft, non-tender, bowel sounds present, no hepatosplenomegaly  Extremities: no clubbing, cyanosis or pitting edema  Neurological: normal speech and affect, no gross motor deficits    Labs:  CBC RESULTS:  Lab Results   Component Value Date    WBC 5.0 03/18/2019    RBC 4.67 " 03/18/2019    HGB 13.5 03/18/2019    HCT 44.0 03/18/2019    MCV 94 03/18/2019    MCH 28.9 03/18/2019    MCHC 30.7 (L) 03/18/2019    RDW 14.0 03/18/2019     03/18/2019       CMP RESULTS:  Lab Results   Component Value Date     10/29/2019    POTASSIUM 3.6 10/29/2019    CHLORIDE 102 10/29/2019    CO2 30 10/29/2019    ANIONGAP 6 10/29/2019     (H) 10/29/2019    BUN 14 10/29/2019    CR 1.25 (H) 10/29/2019    GFRESTIMATED 52 (L) 10/29/2019    GFRESTBLACK 60 (L) 10/29/2019    JUSTIN 8.4 (L) 10/29/2019    BILITOTAL 0.3 10/29/2019    ALBUMIN 2.9 (L) 10/29/2019    ALKPHOS 78 10/29/2019    ALT 21 10/29/2019    AST 9 10/29/2019      INR RESULTS:  Lab Results   Component Value Date    INR 3.49 (H) 07/23/2019       Lab Results   Component Value Date    MAG 1.8 01/06/2015     Lab Results   Component Value Date    NTBNPI 30 02/23/2015     Lab Results   Component Value Date    NTBNP 268 (H) 07/23/2019     Assessment and Plan:   1. Chronic systolic heart failure secondary to NICM.    Stage C  NYHA Class III  ACEi/ARB yes - on hydralazine - will add isosorbide today   BB yes  Aldosterone antagonist yes  SCD prophylaxis does not meet criteria for implant  % BiV pacing: N/A  Fluid status hypervolemic but patient is hard to assess  NSAID use: no  Sleep Apnea Evaluation: uses CPAP    2. DM2 - improved control per pt report. Managed by PCP    3. Afib: rate controlled on warfarin- toprol XL    4. Ongoing tobacco use - pt has cut back on tobacco use to 1-2 cigs/day.    5. HTN: well controlled on current regime    6. CKD: stable renal function - suspect fluctuations in CR due to volume status.   Follow-up 3 months.     CC  Patient Care Team:  Jamilah Bernal MD as PCP - General (Internal Medicine)  Percy Alcala MD as MD (Cardiology)  Silva Damon MD as MD (Internal Medicine)  Norman Jean DPM (Podiatry)  Steph Kim PA-C as Physician Assistant (Physician Assistant)  Reyna,  Delmi Bowman MD as MD (Ophthalmology)  Wilder, Nelly Rogers MD as MD (Ophthalmology)  Drea Rosas, RN as Clinic Care Coordinator (Bariatric)  Isela Archibald PA-C as Physician Assistant (Physician Assistant)  Tom Guardado MD as MD (Ophthalmology)  Russel Vergara OD as MD (Optometry)  Ophelia Kenny, RN as Nurse Coordinator (Cardiology)  Kristen Cuello APRN CNP as Nurse Practitioner (Cardiology)  Ximena Carpenter NP as Nurse Practitioner (Cardiology)  Ximena Carpenter NP as Nurse Practitioner (Nurse Practitioner - Family)  Lauren Resendez RPH as Pharmacist (Pharmacist Clinician- Clinical Pharmacy Specialist)  Alexandru Wang RN as Specialty Care Coordinator (Cardiology)  Shanice Ambrocio, RN as Registered Nurse (Cardiology)  Rajwinder Heaton NP as Nurse Practitioner (Nurse Practitioner - Gerontology)  Isela Archibald PA-C as Physician Assistant (Physician Assistant)  SELF, REFERRED    Please do not hesitate to contact me if you have any questions/concerns.     Sincerely,     YOCASTA Cross CNP

## 2020-08-06 ENCOUNTER — MYC REFILL (OUTPATIENT)
Dept: INTERNAL MEDICINE | Facility: CLINIC | Age: 45
End: 2020-08-06

## 2020-08-06 ENCOUNTER — MYC REFILL (OUTPATIENT)
Dept: CARDIOLOGY | Facility: CLINIC | Age: 45
End: 2020-08-06

## 2020-08-06 ENCOUNTER — MYC REFILL (OUTPATIENT)
Dept: ENDOCRINOLOGY | Facility: CLINIC | Age: 45
End: 2020-08-06

## 2020-08-06 DIAGNOSIS — I50.23 ACUTE ON CHRONIC SYSTOLIC HEART FAILURE (H): ICD-10-CM

## 2020-08-06 DIAGNOSIS — B35.1 DERMATOPHYTOSIS OF NAIL: ICD-10-CM

## 2020-08-06 DIAGNOSIS — M79.672 LEFT FOOT PAIN: ICD-10-CM

## 2020-08-06 DIAGNOSIS — E87.6 HYPOKALEMIA: ICD-10-CM

## 2020-08-06 DIAGNOSIS — Z79.4 TYPE 2 DIABETES MELLITUS WITH HYPERGLYCEMIA, WITH LONG-TERM CURRENT USE OF INSULIN (H): ICD-10-CM

## 2020-08-06 DIAGNOSIS — E11.9 DIABETES MELLITUS, TYPE 2 (H): ICD-10-CM

## 2020-08-06 DIAGNOSIS — I50.32 CHRONIC DIASTOLIC HEART FAILURE (H): ICD-10-CM

## 2020-08-06 DIAGNOSIS — E66.01 MORBID OBESITY (H): ICD-10-CM

## 2020-08-06 DIAGNOSIS — E11.42 TYPE 2 DIABETES MELLITUS WITH DIABETIC POLYNEUROPATHY, WITHOUT LONG-TERM CURRENT USE OF INSULIN (H): ICD-10-CM

## 2020-08-06 DIAGNOSIS — E11.65 TYPE 2 DIABETES MELLITUS WITH HYPERGLYCEMIA, WITH LONG-TERM CURRENT USE OF INSULIN (H): ICD-10-CM

## 2020-08-06 RX ORDER — TOPIRAMATE 50 MG/1
150 TABLET, FILM COATED ORAL DAILY
Qty: 90 TABLET | Refills: 1 | Status: CANCELLED | OUTPATIENT
Start: 2020-08-06

## 2020-08-06 RX ORDER — DULAGLUTIDE 1.5 MG/.5ML
1.5 INJECTION, SOLUTION SUBCUTANEOUS
Qty: 6 ML | Refills: 3 | Status: CANCELLED | OUTPATIENT
Start: 2020-08-06

## 2020-08-06 RX ORDER — PEN NEEDLE, DIABETIC 32GX 5/32"
NEEDLE, DISPOSABLE MISCELLANEOUS
Qty: 600 EACH | Refills: 3 | Status: CANCELLED | OUTPATIENT
Start: 2020-08-06

## 2020-08-06 RX ORDER — ISOSORBIDE DINITRATE 20 MG/1
20 TABLET ORAL 3 TIMES DAILY
Qty: 270 TABLET | Refills: 1 | Status: CANCELLED | OUTPATIENT
Start: 2020-08-06

## 2020-08-07 ENCOUNTER — TELEPHONE (OUTPATIENT)
Dept: PULMONOLOGY | Facility: CLINIC | Age: 45
End: 2020-08-07

## 2020-08-07 DIAGNOSIS — Z79.4 TYPE 2 DIABETES MELLITUS WITH HYPERGLYCEMIA, WITH LONG-TERM CURRENT USE OF INSULIN (H): ICD-10-CM

## 2020-08-07 DIAGNOSIS — E11.65 TYPE 2 DIABETES MELLITUS WITH HYPERGLYCEMIA, WITH LONG-TERM CURRENT USE OF INSULIN (H): ICD-10-CM

## 2020-08-07 RX ORDER — POTASSIUM CHLORIDE 1500 MG/1
40 TABLET, EXTENDED RELEASE ORAL 4 TIMES DAILY
Qty: 240 TABLET | Refills: 6 | Status: CANCELLED | OUTPATIENT
Start: 2020-08-07

## 2020-08-07 RX ORDER — METOLAZONE 2.5 MG/1
2.5 TABLET ORAL 2 TIMES DAILY
Qty: 180 TABLET | Refills: 3 | Status: CANCELLED | OUTPATIENT
Start: 2020-08-07

## 2020-08-07 RX ORDER — SPIRONOLACTONE 50 MG/1
50 TABLET, FILM COATED ORAL DAILY
Qty: 90 TABLET | Refills: 1 | Status: CANCELLED | OUTPATIENT
Start: 2020-08-07

## 2020-08-07 NOTE — TELEPHONE ENCOUNTER
Left patient message that can not refill Spiriva as she had not been seen since 2013. Explained to please request from PCP.

## 2020-08-07 NOTE — TELEPHONE ENCOUNTER
capsaicin (ZOSTRIX) 0.025 % external cream       Last Written Prescription Date:  4/3/20  Last Fill Quantity: 1 tube,   # refills: 0  Last Virtual Visit : 4/16/20  Future Office visit:  None scheduled    Routing refill request to provider for review/approval because:  Drug not on the [facility], [facility] or [facility] refill protocol

## 2020-08-08 RX ORDER — CAPSAICIN 0.025 %
1 CREAM (GRAM) TOPICAL 3 TIMES DAILY PRN
Qty: 1 TUBE | Refills: 1 | Status: ON HOLD | OUTPATIENT
Start: 2020-08-08 | End: 2023-01-01

## 2020-08-10 ENCOUNTER — TELEPHONE (OUTPATIENT)
Dept: PODIATRY | Facility: CLINIC | Age: 45
End: 2020-08-10

## 2020-08-10 NOTE — TELEPHONE ENCOUNTER
Prior Authorization Approval    Authorization Effective Date: 5/12/2020  Authorization Expiration Date: 8/10/2021  Medication: Ciclopirox 8%-PA approved  Approved Dose/Quantity:   Reference #:     Insurance Company: [employer] Part D - Phone [phone] Fax [phone]  Expected CoPay:       CoPay Card Available:      Foundation Assistance Needed:    Which Pharmacy is filling the prescription (Not needed for infusion/clinic administered): [facility] [place], [place] - [address] [place] [address] [id number]  Pharmacy Notified: Yes-Spoke to Jerod. He will let us know if we also need to initiate PA for [place] after they run this through her primary insurance.   Patient Notified: No-Pharmacy will contact

## 2020-08-10 NOTE — TELEPHONE ENCOUNTER
Central Prior Authorization Team   Phone: [phone]      PA Initiation    Medication: Ciclopirox 8%-PA initiated  Insurance Company: [employer] Part D - Phone [phone] Fax [phone]  Pharmacy Filling the Rx: [facility] [place], [place] - [address] [place] [address] [id number]  Filling Pharmacy Phone: [phone]  Filling Pharmacy Fax:    Start Date: 8/10/2020

## 2020-08-11 ENCOUNTER — TELEPHONE (OUTPATIENT)
Dept: ENDOCRINOLOGY | Facility: CLINIC | Age: 45
End: 2020-08-11

## 2020-08-11 DIAGNOSIS — Z79.4 TYPE 2 DIABETES MELLITUS WITH HYPERGLYCEMIA, WITH LONG-TERM CURRENT USE OF INSULIN (H): Primary | ICD-10-CM

## 2020-08-11 DIAGNOSIS — Z79.4 TYPE 2 DIABETES MELLITUS WITH HYPERGLYCEMIA, WITH LONG-TERM CURRENT USE OF INSULIN (H): ICD-10-CM

## 2020-08-11 DIAGNOSIS — E11.65 TYPE 2 DIABETES MELLITUS WITH HYPERGLYCEMIA, WITH LONG-TERM CURRENT USE OF INSULIN (H): ICD-10-CM

## 2020-08-11 DIAGNOSIS — E11.65 TYPE 2 DIABETES MELLITUS WITH HYPERGLYCEMIA, WITH LONG-TERM CURRENT USE OF INSULIN (H): Primary | ICD-10-CM

## 2020-08-11 RX ORDER — GABAPENTIN 300 MG/1
600 CAPSULE ORAL 2 TIMES DAILY
Qty: 360 CAPSULE | Refills: 1 | Status: SHIPPED | OUTPATIENT
Start: 2020-08-11 | End: 2021-05-07

## 2020-08-11 RX ORDER — DULAGLUTIDE 0.75 MG/.5ML
0.75 INJECTION, SOLUTION SUBCUTANEOUS
Qty: 3 ML | Refills: 0 | Status: SHIPPED | OUTPATIENT
Start: 2020-08-11 | End: 2020-08-24

## 2020-08-11 RX ORDER — DULAGLUTIDE 1.5 MG/.5ML
1.5 INJECTION, SOLUTION SUBCUTANEOUS
Qty: 6 ML | Refills: 3 | Status: SHIPPED | OUTPATIENT
Start: 2020-08-11 | End: 2021-11-23

## 2020-08-11 NOTE — TELEPHONE ENCOUNTER
----- Message from Isela Archibald PA-C sent at 8/11/2020  1:32 PM CDT -----  Regarding: RE: dosing  Can you send 2 Trulicity at 0.75 mg per week.  Thanks benny  ----- Message -----  From: Sarina Haji RN  Sent: 8/11/2020   1:13 PM CDT  To: Isela Archibald PA-C  Subject: dosing                                           Pricilla froze her last 2 pens of Trulicity  so cannot use them.  Insurance will not refill until 2 weeks  . If she wants 2 doses of trulicty she either needs to pay $2000 OOP  or if a different dose is sent in she could  get that filled. Do you suggest she skip 2 weeks of dosing or  send order in for 2 Trulicity 0.75 pens  at lower dose ?  I don't know how insurance would react to a lower dose for 2 weeks. Sarina Haji, RN on 8/11/2020 at 1:16 PM'

## 2020-08-11 NOTE — TELEPHONE ENCOUNTER
GABAPENTIN 300MG CAPS    Last Written Prescription Date:  5/11/2020  Last Fill Quantity: 360,   # refills: 0  Last Office Visit : 4/16/2020  Future Office visit:  8/12/2020    Routing refill request to provider for review/approval because:  Drug not on the [facility], [facility] or [facility] refill protocol or controlled substance    Ashley Haynes RN  Central Triage Red Flags/Med Refills

## 2020-08-11 NOTE — TELEPHONE ENCOUNTER
NEW ORDER FOR TRULICITY 0.75 x 2 PENS SENT TO PHARMACY. [facility] WAS NOTIFIED AND WILL PICK IT UP TOMORROW .Sarina Haji RN on 8/11/2020 at 3:40 PM

## 2020-08-11 NOTE — PROGRESS NOTES
"Pricilla Brown is a 45 year old female who is being evaluated via a billable telephone visit.      The patient has been notified of following:     \"This telephone visit will be conducted via a call between you and your physician/provider. We have found that certain health care needs can be provided without the need for a physical exam.  This service lets us provide the care you need with a short phone conversation.  If a prescription is necessary we can send it directly to your pharmacy.  If lab work is needed we can place an order for that and you can then stop by our lab to have the test done at a later time.    Telephone visits are billed at different rates depending on your insurance coverage. During this emergency period, for some insurers they may be billed the same as an in-person visit.  Please reach out to your insurance provider with any questions.    If during the course of the call the physician/provider feels a telephone visit is not appropriate, you will not be charged for this service.\"    Patient has given verbal consent for Telephone visit?  Yes    What phone number would you like to be contacted at? [phone]    How would you like to obtain your AVS? Haja Desai MA    Patients Glucose Data was not able to be obtained. Patient was hospitalized last week for throat infection. Glucose was high at hospital. A1c over 16.      Due to the COVID 19 pandemic this visit was converted to a telephone visit in order to help prevent spread of infection in this patient and the general population.    Time of start: 11:00 am   Time of end: 11:16 am  Total duration of telephone visit: 16 minutes.    HPI  Pricilla Brown is a 45 year old female with type 2 diabetes mellitus. Telephone visit today for diabetes follow up.  She is also seen in the [facility].  Pt's hx is also significant for morbid obesity, dilated cardiomyopathy with diastolic dysfunction, atrial fibrillation, hx of PE, HTN, " Grave's disease, peripheral neuropathy and sleep apnea.  Pricilla was recently admitted with left peritonsillar abscess s/p I & D currently on oral antibiotics.  During her hospitalization, an A1C was done which was 16 %.  For her diabetes, pt is prescribed to take Trulicity 1.5 mg SQ once a week,Toujeo 190 units SQ each am and Novolog 75 units with meals, plus correction insulin ( 2 units/50 for BG > 150 and Jardiance 25 mg daily.  She admits to missing some Novolog doses.    Her A1C was 10.1 % in July 2019 and her previous A1C was 8.4 %.      Her previous A1C was 9.6 % in May 2018.  I have no glucose meter download data today.  Pt tells me her FBS is 263 today and her blood sugars have been in 200-300 range.  No hypoglycemia.  On ROS today, throat is sore.  No fevers or chills at this time.  Pt has chronic SOB and is using her CPAP. Mild cough. She is smoking.  She has numbness and tingling in both feet and hands.  Pt denies foot ulcers at this time.  Some dysuria; no hematuria.  She denies blurred vision, chest pain, abd pain or diarrhea.    ROS  Please see under HPI.    Allergies  Allergies   Allergen Reactions     Penicillins Other (See Comments)     CHILDHOOD ALLERGY     Ibuprofen Sodium Hives and Rash       Medications  Current Outpatient Medications   Medication Sig Dispense Refill     acetaminophen (TYLENOL) 325 MG tablet Take 2 tablets (650 mg) by mouth 3 times daily as needed for mild pain or fever (total acetaminophen dose should not exceed 3000 mg per day) 180 tablet 0     albuterol (PROAIR HFA/PROVENTIL HFA/VENTOLIN HFA) 108 (90 Base) MCG/ACT inhaler Inhale 2 puffs into the lungs every 6 hours as needed for shortness of breath / dyspnea 75 g 0     bisacodyl (DULCOLAX) 10 MG suppository Place 1 suppository (10 mg) rectally daily as needed for constipation 6 suppository 0     blood glucose (ACCU-CHEK RON PLUS) test strip Use to test blood sugar 4  times daily or as directed. 360 each 3     blood glucose  (NO BRAND SPECIFIED) lancets standard USE TO CHECK  blood sugar fasting each am  prelunch and predinner daily OR AS DIRECTED Call clinic to schedule MD APPT. 400 each 3     blood glucose (NO BRAND SPECIFIED) test strip Use to test blood sugars 3 times daily or as directed 400 strip 3     blood glucose monitoring (IBG STAR) meter device kit -PLEASE GIVE PATIENT A DEVICE HER INSURANCE WILL COVER- 1 kit 0     blood glucose monitoring (NO BRAND SPECIFIED) meter device kit Use to test blood sugar 3 times daily or as directed. 1 kit 1     bumetanide (BUMEX) 1 MG tablet Take 5 tablets (5 mg) by mouth 2 times daily 300 tablet 11     buPROPion (WELLBUTRIN SR) 100 MG 12 hr tablet Take 1 tablet (100 mg) by mouth 2 times daily 180 tablet 0     capsaicin (ZOSTRIX) 0.025 % external cream Apply 1 g topically 3 times daily as needed (use for neuropathy pain) 1 Tube 1     ciclopirox (LOPROX) 0.77 % cream Apply topically 2 times daily To feet and toenails. 90 g 6     ciclopirox (PENLAC) 8 % external solution Apply topically daily To toenails. 6.6 mL 0     clotrimazole (LOTRIMIN) 1 % external cream Apply topically 2 times daily as needed (skin irritation) 30 g 0     colchicine (COLCYRS) 0.6 MG tablet Take 1-2 tablets (0.6-1.2 mg) by mouth daily as needed for moderate pain 180 tablet 0     diclofenac (VOLTAREN) 1 % topical gel Apply 4 grams to knees or 2 grams to hands four times daily using enclosed dosing card. 100 g 1     dulaglutide (TRULICITY) 1.5 MG/0.5ML pen Inject 1.5 mg Subcutaneous every 7 days 6 mL 3     DULoxetine (CYMBALTA) 20 MG capsule Take 1 capsule (20 mg) by mouth 2 times daily 60 capsule 0     Efinaconazole 10 % SOLN Externally apply topically daily To toenails. 8 mL 11     empagliflozin (JARDIANCE) 25 MG TABS tablet Take 1 tablet (25 mg) by mouth daily 90 tablet 3     fluconazole (DIFLUCAN) 150 MG tablet TAKE ONE TABLET BY MOUTH ONCE FOR 1 DOSE 1 tablet 0     gabapentin (NEURONTIN) 300 MG capsule Take 2 capsules  (600 mg) by mouth 2 times daily 360 capsule 1     hydrALAZINE (APRESOLINE) 25 MG tablet Take 50-75 mg by mouth Take 2 tabs (50 mg) in am, 2 tabs (50 mg) midday, and 3 tabs (75 mg) in pm daily  450 tablet 3     insulin aspart (NOVOLOG FLEXPEN) 100 UNIT/ML pen Inject 85 units with meals,plus correction.Pt uses approx 280 units/24 hrs. 250 mL 3     insulin glargine U-300 (TOUJEO SOLOSTAR) 300 UNIT/ML (1 units dial) pen Inject 195 units subcutaneous each am. If FBS remain > 150, increase 200 units subcutaneous each am. 30 mL 3     insulin pen needle (BD RIVER U/F) 32G X 4 MM miscellaneous Use 6 daily or as directed 600 each 3     isosorbide dinitrate (ISORDIL) 20 MG tablet Take 1 tablet (20 mg) by mouth 3 times daily 270 tablet 1     levothyroxine (SYNTHROID/LEVOTHROID) 200 MCG tablet TAKE ONE TABLET BY MOUTH DAILY MONDAY THROUGH SATURDAY, AND TWO TABLETS ON SUNDAYS 102 tablet 1     metolazone (ZAROXOLYN) 2.5 MG tablet Take 1 tablet (2.5 mg) by mouth 2 times daily Take 1/2 hour prior to torsemide 180 tablet 3     metoprolol succinate ER (TOPROL-XL) 200 MG 24 hr tablet Take 1 tablet (200 mg) by mouth daily 90 tablet 2     morphine (MS CONTIN) 15 MG 12 hr tablet Take 15 mg by mouth daily as needed       nicotine (NICODERM CQ) 14 MG/24HR 24 hr patch Place 1 patch onto the skin every 24 hours 90 patch 0     nystatin (MYCOSTATIN) 379699 UNIT/GM external cream Apply topically 2 times daily To toenails 90 g 6     order for DME Left foot 1 Units 0     order for DME Equipment being ordered: [id number] $92   Plantar, fasciitis, LG, night splint 1 each 0     order for DME Equipment being ordered: Challenger Wide walker if available - patient needs seat, basket and brakes. 1 each 0     ORDER FOR DME Use your CPAP device as directed by your provider. Pressure change to min 13 max 18cwp 1 each 99     oxyCODONE-acetaminophen (PERCOCET) 5-325 MG per tablet Take 1 tablet by mouth every 8 hours as needed for moderate to severe pain  (try to limit use, no further prescriptions until seen in pain clinic) 60 tablet 0     polyethylene glycol (MIRALAX/GLYCOLAX) powder        potassium chloride ER (KLOR-CON M) 20 MEQ CR tablet Take 2 tablets (40 mEq) by mouth 4 times daily 240 tablet 6     Respiratory Therapy Supplies (NEBULIZER COMPRESSOR) KIT 1 Device 4 times daily as needed. 1 kit 3     senna (SENOKOT) 8.6 MG tablet Take 1 tablet by mouth 2 times daily 45 tablet 0     spironolactone (ALDACTONE) 50 MG tablet Take 1 tablet (50 mg) by mouth daily 90 tablet 1     tiotropium (SPIRIVA HANDIHALER) 18 MCG inhalation capsule Inhale contents of one capsule daily. 30 capsule 1     topiramate (TOPAMAX) 50 MG tablet Take 3 tablets (150 mg) by mouth daily 90 tablet 1     dulaglutide (TRULICITY) 0.75 MG/0.5ML pen Inject 0.75 mg Subcutaneous every 7 days 3 mL 0       Family History  family history includes Cerebrovascular Disease in her mother; Diabetes in her brother, mother, and sister; Heart Disease in her brother, father, mother, and sister; Hypertension in her mother; Psychotic Disorder in her brother; Thyroid Disease in her brother, maternal aunt, maternal uncle, mother, sister, sister, and sister.    Social History  Smoke: yes.  ETOH: rare.  Lives with significant other.  No children.      Past Medical History  Past Medical History:   Diagnosis Date     A-fib (H) 2011    on coumadin since 1/13     Asthma     as a kid     Chest pain 2/1/2017     Chronic anticoagulation for a-fib 2/15/2013    INR's followed by coumadin clinic at      Diabetes mellitus (H) 2012     Diastolic heart failure 2/15/2013     Dilated cardiomyopathy (H) 1/8/2013     HTN (hypertension)      Hyperthyroidism     Graves, s/p I131 1/13, now on prednisone and methimazole     Morbid obesity (H)      Pulmonary embolism (H) 1/12    hospitalized in [place], on lovenox/coumadin for a few months but stopped, hypercoag w/u neg per pt     Sleep apnea     using CPAP       Physical Exam    No exam  today.    RESULTS  Creatinine   Date Value Ref Range Status   10/29/2019 1.25 (H) 0.52 - 1.04 mg/dL Final     GFR Estimate   Date Value Ref Range Status   10/29/2019 52 (L) >60 mL/min/[1.73_m2] Final     Comment:     Non  GFR Calc  Starting 12/18/2018, serum creatinine based estimated GFR (eGFR) will be   calculated using the Chronic Kidney Disease Epidemiology Collaboration   (CKD-EPI) equation.       Hemoglobin A1C   Date Value Ref Range Status   10/11/2018 8.4 (H) 0 - 5.6 % Final     Comment:     Normal <5.7% Prediabetes 5.7-6.4%  Diabetes 6.5% or higher - adopted from ADA   consensus guidelines.       Potassium   Date Value Ref Range Status   10/29/2019 3.6 3.4 - 5.3 mmol/L Final     ALT   Date Value Ref Range Status   10/29/2019 21 0 - 50 U/L Final     AST   Date Value Ref Range Status   10/29/2019 9 0 - 45 U/L Final     TSH   Date Value Ref Range Status   10/11/2018 2.06 0.40 - 4.00 mU/L Final     T4 Free   Date Value Ref Range Status   10/11/2018 0.95 0.76 - 1.46 ng/dL Final       Cholesterol   Date Value Ref Range Status   10/11/2018 136 <200 mg/dL Final   10/26/2016 135 <200 mg/dL Final     HDL Cholesterol   Date Value Ref Range Status   10/11/2018 37 (L) >49 mg/dL Final   10/26/2016 37 (L) >49 mg/dL Final     LDL Cholesterol Calculated   Date Value Ref Range Status   10/11/2018 70 <100 mg/dL Final     Comment:     Desirable:       <100 mg/dl   10/26/2016 68 <100 mg/dL Final     Comment:     Desirable:       <100 mg/dl     Triglycerides   Date Value Ref Range Status   10/11/2018 143 <150 mg/dL Final   10/26/2016 151 (H) <150 mg/dL Final     Comment:     Borderline high:  150-199 mg/dl   High:             200-499 mg/dl   Very high:       >499 mg/dl   Non Fasting       Cholesterol/HDL Ratio   Date Value Ref Range Status   07/14/2015 3.4 0.0 - 5.0 Final   01/06/2013 3.6 0.0 - 5.0 Final     A1C 10.1   7/24/2019  A1C  8.4    10/11/2018  A1C  8.9    2/7/2018  A1C  8.8     2/7/2017  A1C  9.0     11/10/2016  A1C 10.7   6/16/2016  A1C 12.1   3/10/2016    ASSESSMENT/PLAN:    1.  TYPE 2 DIABETES MELLITUS: Uncontrolled type 2 diabetes mellitus.  Pricilla is to check her blood sugar 4 times daily due to poorly controlled diabetes and fluctuation in her glycemia control.  She was instructed to increase Toujeo 195 units subcutaneous each am, take her Novolog 85 units, plus correction with meals, Trulicity 1.5 mg subcutaneous weekly and increase Jardiance 25 mg each am.  IF her FBS remains > 150 after 4 days, she is to increase her Toujeo 200 units subcutaneous each am.  I told her I would call her in 2-3 weeks for a follow up visit.  She does not tolerate Metformin due to GI distress.  She was seen by [facility] in June 2019 without retinopathy per patient. She has [facility] appt scheduled for 8/12/2020.  Pt's LDL is 70 in Oct 2018. She is not taking a statin at this time.    2.  HTN: No vitals today.Continue current RXs.     3.  CARDIOMYOPATHY/A FIB:  Pt followed here by Cardiology staff.      4. GRAVE'S DISEASE:  Hx of Grave's disease s/p I 131 in Jan 2013.    Pt became hypothyroid following above treatment and is taking  Levothyroxine 200 mcg 1 pill Monday - Saturday and 2 pills on Sundays.  Her TSH was normal in Oct 2018.  TSH has been ordered.    5.  OBESITY:  Morbid obesity with BMI > 70 kg/m2.  She has been seen by the [facility] staff.  She needs to lose weight prior to being considered for bariatric surgery.    6.  FOLLOW UP : with me in 2-3 weeks.  Reminded pt I have ordered labs.

## 2020-08-12 ENCOUNTER — TELEPHONE (OUTPATIENT)
Dept: CARDIOLOGY | Facility: CLINIC | Age: 45
End: 2020-08-12

## 2020-08-12 ENCOUNTER — VIRTUAL VISIT (OUTPATIENT)
Dept: ENDOCRINOLOGY | Facility: CLINIC | Age: 45
End: 2020-08-12
Payer: MEDICARE

## 2020-08-12 ENCOUNTER — CARE COORDINATION (OUTPATIENT)
Dept: CARDIOLOGY | Facility: CLINIC | Age: 45
End: 2020-08-12

## 2020-08-12 ENCOUNTER — VIRTUAL VISIT (OUTPATIENT)
Dept: CARDIOLOGY | Facility: CLINIC | Age: 45
End: 2020-08-12
Attending: INTERNAL MEDICINE
Payer: MEDICARE

## 2020-08-12 DIAGNOSIS — I42.9 CARDIOMYOPATHY, UNSPECIFIED TYPE (H): Primary | ICD-10-CM

## 2020-08-12 DIAGNOSIS — R53.83 FATIGUE: ICD-10-CM

## 2020-08-12 DIAGNOSIS — I48.91 A-FIB (H): ICD-10-CM

## 2020-08-12 DIAGNOSIS — E78.5 HYPERLIPIDEMIA: ICD-10-CM

## 2020-08-12 DIAGNOSIS — I50.32 CHRONIC DIASTOLIC HEART FAILURE (H): ICD-10-CM

## 2020-08-12 DIAGNOSIS — I50.23 ACUTE ON CHRONIC SYSTOLIC HEART FAILURE (H): ICD-10-CM

## 2020-08-12 DIAGNOSIS — E11.65 TYPE 2 DIABETES MELLITUS WITH HYPERGLYCEMIA, WITH LONG-TERM CURRENT USE OF INSULIN (H): ICD-10-CM

## 2020-08-12 DIAGNOSIS — Z79.4 TYPE 2 DIABETES MELLITUS WITH HYPERGLYCEMIA, WITH LONG-TERM CURRENT USE OF INSULIN (H): ICD-10-CM

## 2020-08-12 DIAGNOSIS — E11.42 TYPE 2 DIABETES MELLITUS WITH DIABETIC POLYNEUROPATHY, WITHOUT LONG-TERM CURRENT USE OF INSULIN (H): ICD-10-CM

## 2020-08-12 DIAGNOSIS — R53.83 FEELING TIRED: ICD-10-CM

## 2020-08-12 DIAGNOSIS — D50.9 IRON DEFICIENCY ANEMIA, UNSPECIFIED IRON DEFICIENCY ANEMIA TYPE: ICD-10-CM

## 2020-08-12 DIAGNOSIS — K76.0 FATTY LIVER: ICD-10-CM

## 2020-08-12 DIAGNOSIS — E11.9 DIABETES (H): ICD-10-CM

## 2020-08-12 PROCEDURE — 99443 ZZC PHYSICIAN TELEPHONE EVALUATION 21-30 MIN: CPT | Performed by: INTERNAL MEDICINE

## 2020-08-12 RX ORDER — CLINDAMYCIN HCL 300 MG
CAPSULE ORAL
COMMUNITY
Start: 2020-08-04 | End: 2021-09-09

## 2020-08-12 RX ORDER — INSULIN GLARGINE 300 U/ML
INJECTION, SOLUTION SUBCUTANEOUS
Qty: 30 ML | Refills: 3 | Status: SHIPPED | OUTPATIENT
Start: 2020-08-12 | End: 2020-08-24

## 2020-08-12 RX ORDER — INSULIN ASPART 100 [IU]/ML
INJECTION, SOLUTION INTRAVENOUS; SUBCUTANEOUS
Qty: 250 ML | Refills: 3 | Status: SHIPPED | OUTPATIENT
Start: 2020-08-12 | End: 2020-08-28

## 2020-08-12 NOTE — TELEPHONE ENCOUNTER
"[facility] Call Center    Phone Message    May a detailed message be left on voicemail: yes     Reason for Call: Jacklyn reporting that she needs a \"release of information\" from Dr. Parra for this pt, new Fax# [phone]. Please send her request to this fax# instead. Thank you. Call Jacklyn if needed.       Action Taken: Message routed to:  [facility] ([facility]):  Cardiology    Travel Screening: Not Applicable                                                                    [phone]      "

## 2020-08-12 NOTE — LETTER
"8/12/2020    RE: Pricilla Brown  [address]  [facility] [place] [address]       Dear Colleague,    Thank you for the opportunity to participate in the care of your patient, Pricilla Brown, at the [facility] HEART [facility]. Please see a copy of my visit note below.    Pricilla Brown is a 45 year old female who is being evaluated via a billable telephone visit.      1. Paroxysmal atrial fibrillation              Warfarin anticoagulation  2. Morbid obesity  3. Diabetes  4. Hypothyroidism  5. Gout  6. HFpEF  7. Diabetes  8. JYOTI  9. Excessive daytime fatigue  10. CKD      Plan:  1. Labs next week: CBC, HgbAic, BNP, CRPinflammation, CMP, iron, TSH, lipids  2. Schedule hepatic ultrasound \"fatty liver\"  3.       Wt Readings from Last 24 Encounters:   06/19/20 (!) 222.3 kg (490 lb)   10/29/19 (!) 226.7 kg (499 lb 11.2 oz)   07/24/19 (!) 223.9 kg (493 lb 9.6 oz)   07/23/19 (!) 229.5 kg (506 lb)   06/27/19 (!) 225.9 kg (498 lb)   03/13/19 (!) 227.2 kg (500 lb 14.4 oz)   03/13/19 (!) 227.3 kg (501 lb)   02/19/19 (!) 229.5 kg (506 lb)   01/07/19 (!) 224.6 kg (495 lb 1.6 oz)   11/30/18 (!) 220.1 kg (485 lb 4.8 oz)   11/29/18 (!) 220 kg (485 lb)   10/30/18 (!) 215.1 kg (474 lb 1.6 oz)   10/25/18 (!) 211.4 kg (466 lb)   10/11/18 (!) 211.5 kg (466 lb 4.8 oz)   10/09/18 (!) 207 kg (456 lb 4.8 oz)   09/20/18 (!) 206.8 kg (456 lb)   09/11/18 (!) 210.7 kg (464 lb 9.6 oz)   07/25/18 (!) 212.4 kg (468 lb 3.2 oz)   07/19/18 (!) 210.5 kg (464 lb)   06/19/18 (!) 213.7 kg (471 lb 3.2 oz)   06/13/18 (!) 206.2 kg (454 lb 8 oz)   05/29/18 (!) 211.7 kg (466 lb 12.8 oz)   03/29/18 (!) 209.1 kg (460 lb 14.4 oz)   03/20/18 (!) 208.7 kg (460 lb)       Current Outpatient Medications   Medication     acetaminophen (TYLENOL) 325 MG tablet     albuterol (PROAIR HFA/PROVENTIL HFA/VENTOLIN HFA) 108 (90 Base) MCG/ACT inhaler     bisacodyl (DULCOLAX) 10 MG suppository     blood glucose (ACCU-CHEK RON PLUS) " test strip     blood glucose (NO BRAND SPECIFIED) lancets standard     blood glucose (NO BRAND SPECIFIED) test strip     blood glucose monitoring (IBG STAR) meter device kit     blood glucose monitoring (NO BRAND SPECIFIED) meter device kit     bumetanide (BUMEX) 1 MG tablet     buPROPion (WELLBUTRIN SR) 100 MG 12 hr tablet     capsaicin (ZOSTRIX) 0.025 % external cream     ciclopirox (LOPROX) 0.77 % cream     ciclopirox (PENLAC) 8 % external solution     clindamycin (CLEOCIN) 300 MG capsule     clotrimazole (LOTRIMIN) 1 % external cream     colchicine (COLCYRS) 0.6 MG tablet     diclofenac (VOLTAREN) 1 % topical gel     dulaglutide (TRULICITY) 0.75 MG/0.5ML pen     dulaglutide (TRULICITY) 1.5 MG/0.5ML pen     DULoxetine (CYMBALTA) 20 MG capsule     Efinaconazole 10 % SOLN     empagliflozin (JARDIANCE) 25 MG TABS tablet     fluconazole (DIFLUCAN) 150 MG tablet     gabapentin (NEURONTIN) 300 MG capsule     hydrALAZINE (APRESOLINE) 25 MG tablet     insulin aspart (NOVOLOG FLEXPEN) 100 UNIT/ML pen     insulin glargine U-300 (TOUJEO SOLOSTAR) 300 UNIT/ML (1 units dial) pen     insulin pen needle (BD RIVER U/F) 32G X 4 MM miscellaneous     isosorbide dinitrate (ISORDIL) 20 MG tablet     levothyroxine (SYNTHROID/LEVOTHROID) 200 MCG tablet     metolazone (ZAROXOLYN) 2.5 MG tablet     metoprolol succinate ER (TOPROL-XL) 200 MG 24 hr tablet     nicotine (NICODERM CQ) 14 MG/24HR 24 hr patch     nystatin (MYCOSTATIN) 488992 UNIT/GM external cream     order for DME     order for DME     order for DME     ORDER FOR DME     oxyCODONE-acetaminophen (PERCOCET) 5-325 MG per tablet     polyethylene glycol (MIRALAX/GLYCOLAX) powder     potassium chloride ER (KLOR-CON M) 20 MEQ CR tablet     Respiratory Therapy Supplies (NEBULIZER COMPRESSOR) KIT     senna (SENOKOT) 8.6 MG tablet     spironolactone (ALDACTONE) 50 MG tablet     tiotropium (SPIRIVA HANDIHALER) 18 MCG inhalation capsule     topiramate (TOPAMAX) 50 MG tablet     morphine  (MS CONTIN) 15 MG 12 hr tablet     No current facility-administered medications for this visit.      Facility-Administered Medications Ordered in Other Visits   Medication     sodium chloride (PF) 0.9% PF flush 10 mL       Service Date: 2020      Pedro Bolton MD   [facility] Surgery    [address] [place] [address]      Medical Director   [facility]     [address] [place] [address]        Kristen Cuello NP   [facility] Heart at [facility]   [address], [address] [id number]       [facility] [place]note 2[facility]   Coagulation Clinic       NANETTE Chin   [facility] Endocrinology    [address]   [place] [address]      Jamilah Bernal MD   [facility] Internal Medicine   [address] [place] [id number]      RE: Pricilla Brown    MRN: [id number]   : 1975       IMPRESSION:   1.  Morbid obesity with body mass index greater than 70.   2.  Poorly controlled diabetes mellitus.   3.  Obstructive sleep apnea.   4.  Chronic kidney disease.   5.  History of gout.   6.  Atrial fibrillation.   7.  Excessive daytime somnolence.      Dear Providers:      I had the opportunity to talk with your patient Pricilla Brown today for 30 minutes.      Briefly, Ms. Brown is a 45-year-old white female who weighs approximately 500 pounds.  She has intercurrent medical problems as noted above.  In addition, she has a history of medical noncompliance and difficulty in keeping medical appointments.  On the other hand, she is deeply desirous of obtaining bariatric surgery.      Currently, she is seen by Weight Loss, Diabetes, Internal Medicine, Cardiology and Sleep Medicine in a fragmented way, partially as a problem of structure within academic medical clinics.  I am therefore proposing that [facility] may be a better environment for her, where her healthcare needs can be more uniformly reinforced in a holistic way.  In addition, I believe that [facility] has  perhaps more resources available to continue to  and support Pricilla.      When seen today, her weight is stable.  She has obtained a new sleep apnea mask, which has improved her excessive daytime somnolence.  We also discussed the fact that it is virtually impossible to lose weight with undertreated sleep apnea.  I asked her to contact the Sleep people to further discuss her mask and as to whether she needs another sleep study now that she has a fitted mask.  She has been recently seen by the Diabetes Service.  She is on a multidrug regimen of oral and subcutaneous medications.  She does not have polyuria, polydipsia or polyphagia.  She has not been hospitalized for metabolic abnormalities.        She has no interim history of chest pain, chest tightness or paroxysmal nocturnal dyspnea.  She has chronic lymphedema.  She has had recently excessive daytime somnolence.  There is no history of arrhythmia other than palpitations that she associates with paroxysmal atrial fibrillation.  She has previously been on anticoagulation; however, she was dropped from the Anticoagulation Clinic because of inconsistent visits.  Prior to doing this, I talked with both the Coagulation Clinic and MsLester Kevin and agreed that her use of anticoagulants in an unsupervised and irregular manner was potentially quite dangerous to her.      I reviewed her medications.  She is generally taking them as outlined.      She has had no laboratories drawn since October of last year.  We talked about laboratory regularity and the need for monitoring for medication toxicity.  I ordered laboratories for next week.      We had a 15 minute discussion about the advantages that [facility] could offer her.  She is willing to work in that environment.      We also talked about her no-show record and the fact that this would probably interfere with her getting bariatric surgery.  She is still a long way from being a candidate for bariatric surgery, as her  weight would have to be down to the 450-470 range before bariatric surgery is likely to occur.      She has no interim history of chest pain, tightness or heaviness.  She sleeps many nights in a reclining chair.      She is using COVID precautions.      Medications were reviewed and as are listed in the chart.        I recommended:   1.  A new patient visit at [facility].   2.  Followup phone call with us in 2 months.   3.  I would not reinstitute anticoagulants in an unsupervised manner.  She thoroughly understands the risks and benefits of both non-warfarin and warfarin oral anticoagulants.   4.  She is to call the Sleep Medicine people to schedule a followup visit at a suitable time after she has changed masks and to assess whether she needs augmented settings to achieve satisfactory sleep hygiene and outcomes.      I would ask [facility] to make a new patient appointment for her.  Were I doing primary care, this is a patient that I would have come back every week for 6-8 weeks to support her in her quest to change her lifestyle and choices about followup.  She understands that I believe that should she not show up at medical appointments that she is eliminating the opportunity to have bariatric surgery.      Were she to get to the required 450-460 pound level, she will need a CT coronary angiogram at minimum in view of her history of multiple poorly treated risk factors.      I will talk with her again in 6-8 weeks.      Virtual non-video telephone visit time:  30 minutes      Sincerely yours,      Percy Alcala MD        D: 2020   T: 2020   MT: kiana    Name:     BRAXTON SALMERON   MRN:      [id number]        Account:      [id number]   :      1975           Service Date: 2020    Document: [id number]

## 2020-08-12 NOTE — PROGRESS NOTES
Pricilla LEOPOLDO Brown is a 45 year old female who is being evaluated via a billable telephone visit.

## 2020-08-12 NOTE — LETTER
"8/12/2020       RE: Pricilla Brown  [address]  [facility] [address]     Dear Colleague,    Thank you for referring your patient, Pricilla Brown, to the [facility] ENDOCRINOLOGY at [facility]. Please see a copy of my visit note below.    Pricilla Brown is a 45 year old female who is being evaluated via a billable telephone visit.      The patient has been notified of following:     \"This telephone visit will be conducted via a call between you and your physician/provider. We have found that certain health care needs can be provided without the need for a physical exam.  This service lets us provide the care you need with a short phone conversation.  If a prescription is necessary we can send it directly to your pharmacy.  If lab work is needed we can place an order for that and you can then stop by our lab to have the test done at a later time.    Telephone visits are billed at different rates depending on your insurance coverage. During this emergency period, for some insurers they may be billed the same as an in-person visit.  Please reach out to your insurance provider with any questions.    If during the course of the call the physician/provider feels a telephone visit is not appropriate, you will not be charged for this service.\"    Patient has given verbal consent for Telephone visit?  Yes    What phone number would you like to be contacted at? [phone]    How would you like to obtain your AVS? Haja Desai MA    Patients Glucose Data was not able to be obtained. Patient was hospitalized last week for throat infection. Glucose was high at hospital. A1c over 16.      Due to the COVID 19 pandemic this visit was converted to a telephone visit in order to help prevent spread of infection in this patient and the general population.    Time of start: 11:00 am   Time of end: 11:16 am  Total duration of telephone visit: 16 minutes.    YA Pleitez" Kevin is a 45 year old female with type 2 diabetes mellitus. Telephone visit today for diabetes follow up.  She is also seen in the [facility].  Pt's hx is also significant for morbid obesity, dilated cardiomyopathy with diastolic dysfunction, atrial fibrillation, hx of PE, HTN, Grave's disease, peripheral neuropathy and sleep apnea.  Pricilla was recently admitted with left peritonsillar abscess s/p I & D currently on oral antibiotics.  During her hospitalization, an A1C was done which was 16 %.  For her diabetes, pt is prescribed to take Trulicity 1.5 mg SQ once a week,Toujeo 190 units SQ each am and Novolog 75 units with meals, plus correction insulin ( 2 units/50 for BG > 150 and Jardiance 25 mg daily.  She admits to missing some Novolog doses.    Her A1C was 10.1 % in July 2019 and her previous A1C was 8.4 %.      Her previous A1C was 9.6 % in May 2018.  I have no glucose meter download data today.  Pt tells me her FBS is 263 today and her blood sugars have been in 200-300 range.  No hypoglycemia.  On ROS today, throat is sore.  No fevers or chills at this time.  Pt has chronic SOB and is using her CPAP. Mild cough. She is smoking.  She has numbness and tingling in both feet and hands.  Pt denies foot ulcers at this time.  Some dysuria; no hematuria.  She denies blurred vision, chest pain, abd pain or diarrhea.    ROS  Please see under HPI.    Allergies  Allergies   Allergen Reactions     Penicillins Other (See Comments)     CHILDHOOD ALLERGY     Ibuprofen Sodium Hives and Rash       Medications  Current Outpatient Medications   Medication Sig Dispense Refill     acetaminophen (TYLENOL) 325 MG tablet Take 2 tablets (650 mg) by mouth 3 times daily as needed for mild pain or fever (total acetaminophen dose should not exceed 3000 mg per day) 180 tablet 0     albuterol (PROAIR HFA/PROVENTIL HFA/VENTOLIN HFA) 108 (90 Base) MCG/ACT inhaler Inhale 2 puffs into the lungs every 6 hours as needed for shortness of  breath / dyspnea 75 g 0     bisacodyl (DULCOLAX) 10 MG suppository Place 1 suppository (10 mg) rectally daily as needed for constipation 6 suppository 0     blood glucose (ACCU-CHEK RON PLUS) test strip Use to test blood sugar 4  times daily or as directed. 360 each 3     blood glucose (NO BRAND SPECIFIED) lancets standard USE TO CHECK  blood sugar fasting each am  prelunch and predinner daily OR AS DIRECTED Call clinic to schedule MD APPT. 400 each 3     blood glucose (NO BRAND SPECIFIED) test strip Use to test blood sugars 3 times daily or as directed 400 strip 3     blood glucose monitoring (IBG STAR) meter device kit -PLEASE GIVE PATIENT A DEVICE HER INSURANCE WILL COVER- 1 kit 0     blood glucose monitoring (NO BRAND SPECIFIED) meter device kit Use to test blood sugar 3 times daily or as directed. 1 kit 1     bumetanide (BUMEX) 1 MG tablet Take 5 tablets (5 mg) by mouth 2 times daily 300 tablet 11     buPROPion (WELLBUTRIN SR) 100 MG 12 hr tablet Take 1 tablet (100 mg) by mouth 2 times daily 180 tablet 0     capsaicin (ZOSTRIX) 0.025 % external cream Apply 1 g topically 3 times daily as needed (use for neuropathy pain) 1 Tube 1     ciclopirox (LOPROX) 0.77 % cream Apply topically 2 times daily To feet and toenails. 90 g 6     ciclopirox (PENLAC) 8 % external solution Apply topically daily To toenails. 6.6 mL 0     clotrimazole (LOTRIMIN) 1 % external cream Apply topically 2 times daily as needed (skin irritation) 30 g 0     colchicine (COLCYRS) 0.6 MG tablet Take 1-2 tablets (0.6-1.2 mg) by mouth daily as needed for moderate pain 180 tablet 0     diclofenac (VOLTAREN) 1 % topical gel Apply 4 grams to knees or 2 grams to hands four times daily using enclosed dosing card. 100 g 1     dulaglutide (TRULICITY) 1.5 MG/0.5ML pen Inject 1.5 mg Subcutaneous every 7 days 6 mL 3     DULoxetine (CYMBALTA) 20 MG capsule Take 1 capsule (20 mg) by mouth 2 times daily 60 capsule 0     Efinaconazole 10 % SOLN Externally apply  topically daily To toenails. 8 mL 11     empagliflozin (JARDIANCE) 25 MG TABS tablet Take 1 tablet (25 mg) by mouth daily 90 tablet 3     fluconazole (DIFLUCAN) 150 MG tablet TAKE ONE TABLET BY MOUTH ONCE FOR 1 DOSE 1 tablet 0     gabapentin (NEURONTIN) 300 MG capsule Take 2 capsules (600 mg) by mouth 2 times daily 360 capsule 1     hydrALAZINE (APRESOLINE) 25 MG tablet Take 50-75 mg by mouth Take 2 tabs (50 mg) in am, 2 tabs (50 mg) midday, and 3 tabs (75 mg) in pm daily  450 tablet 3     insulin aspart (NOVOLOG FLEXPEN) 100 UNIT/ML pen Inject 85 units with meals,plus correction.Pt uses approx 280 units/24 hrs. 250 mL 3     insulin glargine U-300 (TOUJEO SOLOSTAR) 300 UNIT/ML (1 units dial) pen Inject 195 units subcutaneous each am. If FBS remain > 150, increase 200 units subcutaneous each am. 30 mL 3     insulin pen needle (BD RIVER U/F) 32G X 4 MM miscellaneous Use 6 daily or as directed 600 each 3     isosorbide dinitrate (ISORDIL) 20 MG tablet Take 1 tablet (20 mg) by mouth 3 times daily 270 tablet 1     levothyroxine (SYNTHROID/LEVOTHROID) 200 MCG tablet TAKE ONE TABLET BY MOUTH DAILY MONDAY THROUGH SATURDAY, AND TWO TABLETS ON SUNDAYS 102 tablet 1     metolazone (ZAROXOLYN) 2.5 MG tablet Take 1 tablet (2.5 mg) by mouth 2 times daily Take 1/2 hour prior to torsemide 180 tablet 3     metoprolol succinate ER (TOPROL-XL) 200 MG 24 hr tablet Take 1 tablet (200 mg) by mouth daily 90 tablet 2     morphine (MS CONTIN) 15 MG 12 hr tablet Take 15 mg by mouth daily as needed       nicotine (NICODERM CQ) 14 MG/24HR 24 hr patch Place 1 patch onto the skin every 24 hours 90 patch 0     nystatin (MYCOSTATIN) 734310 UNIT/GM external cream Apply topically 2 times daily To toenails 90 g 6     order for DME Left foot 1 Units 0     order for DME Equipment being ordered: [id number] $92   Plantar, fasciitis, LG, night splint 1 each 0     order for DME Equipment being ordered: Challenger Wide walker if available - patient  needs seat, basket and brakes. 1 each 0     ORDER FOR DME Use your CPAP device as directed by your provider. Pressure change to min 13 max 18cwp 1 each 99     oxyCODONE-acetaminophen (PERCOCET) 5-325 MG per tablet Take 1 tablet by mouth every 8 hours as needed for moderate to severe pain (try to limit use, no further prescriptions until seen in pain clinic) 60 tablet 0     polyethylene glycol (MIRALAX/GLYCOLAX) powder        potassium chloride ER (KLOR-CON M) 20 MEQ CR tablet Take 2 tablets (40 mEq) by mouth 4 times daily 240 tablet 6     Respiratory Therapy Supplies (NEBULIZER COMPRESSOR) KIT 1 Device 4 times daily as needed. 1 kit 3     senna (SENOKOT) 8.6 MG tablet Take 1 tablet by mouth 2 times daily 45 tablet 0     spironolactone (ALDACTONE) 50 MG tablet Take 1 tablet (50 mg) by mouth daily 90 tablet 1     tiotropium (SPIRIVA HANDIHALER) 18 MCG inhalation capsule Inhale contents of one capsule daily. 30 capsule 1     topiramate (TOPAMAX) 50 MG tablet Take 3 tablets (150 mg) by mouth daily 90 tablet 1     dulaglutide (TRULICITY) 0.75 MG/0.5ML pen Inject 0.75 mg Subcutaneous every 7 days 3 mL 0       Family History  family history includes Cerebrovascular Disease in her mother; Diabetes in her brother, mother, and sister; Heart Disease in her brother, father, mother, and sister; Hypertension in her mother; Psychotic Disorder in her brother; Thyroid Disease in her brother, maternal aunt, maternal uncle, mother, sister, sister, and sister.    Social History  Smoke: yes.  ETOH: rare.  Lives with significant other.  No children.      Past Medical History  Past Medical History:   Diagnosis Date     A-fib (H) 2011    on coumadin since 1/13     Asthma     as a kid     Chest pain 2/1/2017     Chronic anticoagulation for a-fib 2/15/2013    INR's followed by coumadin clinic at      Diabetes mellitus (H) 2012     Diastolic heart failure 2/15/2013     Dilated cardiomyopathy (H) 1/8/2013     HTN (hypertension)       Hyperthyroidism     Graves, s/p I131 1/13, now on prednisone and methimazole     Morbid obesity (H)      Pulmonary embolism (H) 1/12    hospitalized in [place], on lovenox/coumadin for a few months but stopped, hypercoag w/u neg per pt     Sleep apnea     using CPAP       Physical Exam    No exam today.    RESULTS  Creatinine   Date Value Ref Range Status   10/29/2019 1.25 (H) 0.52 - 1.04 mg/dL Final     GFR Estimate   Date Value Ref Range Status   10/29/2019 52 (L) >60 mL/min/[1.73_m2] Final     Comment:     Non  GFR Calc  Starting 12/18/2018, serum creatinine based estimated GFR (eGFR) will be   calculated using the Chronic Kidney Disease Epidemiology Collaboration   (CKD-EPI) equation.       Hemoglobin A1C   Date Value Ref Range Status   10/11/2018 8.4 (H) 0 - 5.6 % Final     Comment:     Normal <5.7% Prediabetes 5.7-6.4%  Diabetes 6.5% or higher - adopted from ADA   consensus guidelines.       Potassium   Date Value Ref Range Status   10/29/2019 3.6 3.4 - 5.3 mmol/L Final     ALT   Date Value Ref Range Status   10/29/2019 21 0 - 50 U/L Final     AST   Date Value Ref Range Status   10/29/2019 9 0 - 45 U/L Final     TSH   Date Value Ref Range Status   10/11/2018 2.06 0.40 - 4.00 mU/L Final     T4 Free   Date Value Ref Range Status   10/11/2018 0.95 0.76 - 1.46 ng/dL Final       Cholesterol   Date Value Ref Range Status   10/11/2018 136 <200 mg/dL Final   10/26/2016 135 <200 mg/dL Final     HDL Cholesterol   Date Value Ref Range Status   10/11/2018 37 (L) >49 mg/dL Final   10/26/2016 37 (L) >49 mg/dL Final     LDL Cholesterol Calculated   Date Value Ref Range Status   10/11/2018 70 <100 mg/dL Final     Comment:     Desirable:       <100 mg/dl   10/26/2016 68 <100 mg/dL Final     Comment:     Desirable:       <100 mg/dl     Triglycerides   Date Value Ref Range Status   10/11/2018 143 <150 mg/dL Final   10/26/2016 151 (H) <150 mg/dL Final     Comment:     Borderline high:  150-199 mg/dl   High:              200-499 mg/dl   Very high:       >499 mg/dl   Non Fasting       Cholesterol/HDL Ratio   Date Value Ref Range Status   07/14/2015 3.4 0.0 - 5.0 Final   01/06/2013 3.6 0.0 - 5.0 Final     A1C 10.1   7/24/2019  A1C  8.4    10/11/2018  A1C  8.9    2/7/2018  A1C  8.8     2/7/2017  A1C  9.0    11/10/2016  A1C 10.7   6/16/2016  A1C 12.1   3/10/2016    ASSESSMENT/PLAN:    1.  TYPE 2 DIABETES MELLITUS: Uncontrolled type 2 diabetes mellitus.  Pricilla is to check her blood sugar 4 times daily due to poorly controlled diabetes and fluctuation in her glycemia control.  She was instructed to increase Toujeo 195 units subcutaneous each am, take her Novolog 85 units, plus correction with meals, Trulicity 1.5 mg subcutaneous weekly and increase Jardiance 25 mg each am.  IF her FBS remains > 150 after 4 days, she is to increase her Toujeo 200 units subcutaneous each am.  I told her I would call her in 2-3 weeks for a follow up visit.  She does not tolerate Metformin due to GI distress.  She was seen by [facility] in June 2019 without retinopathy per patient. She has [facility] appt scheduled for 8/12/2020.  Pt's LDL is 70 in Oct 2018. She is not taking a statin at this time.    2.  HTN: No vitals today.Continue current RXs.     3.  CARDIOMYOPATHY/A FIB:  Pt followed here by Cardiology staff.      4. GRAVE'S DISEASE:  Hx of Grave's disease s/p I 131 in Jan 2013.    Pt became hypothyroid following above treatment and is taking  Levothyroxine 200 mcg 1 pill Monday - Saturday and 2 pills on Sundays.  Her TSH was normal in Oct 2018.  TSH has been ordered.    5.  OBESITY:  Morbid obesity with BMI > 70 kg/m2.  She has been seen by the [facility] Loss Clinic staff.  She needs to lose weight prior to being considered for bariatric surgery.    6.  FOLLOW UP : with me in 2-3 weeks.  Reminded pt I have ordered labs.      Again, thank you for allowing me to participate in the care of your patient.      Sincerely,    Isela Archibald PA-C

## 2020-08-12 NOTE — PROGRESS NOTES
"      See dictated note      1. Paroxysmal atrial fibrillation              Warfarin anticoagulation  2. Morbid obesity  3. Diabetes  4. Hypothyroidism  5. Gout  6. HFpEF  7. Diabetes  8. JYOTI  9. Excessive daytime fatigue  10. CKD      Plan:  1. Labs next week: CBC, HgbAic, BNP, CRPinflammation, CMP, iron, TSH, lipids  2. Schedule hepatic ultrasound \"fatty liver\"  3.       Wt Readings from Last 24 Encounters:   06/19/20 (!) 222.3 kg (490 lb)   10/29/19 (!) 226.7 kg (499 lb 11.2 oz)   07/24/19 (!) 223.9 kg (493 lb 9.6 oz)   07/23/19 (!) 229.5 kg (506 lb)   06/27/19 (!) 225.9 kg (498 lb)   03/13/19 (!) 227.2 kg (500 lb 14.4 oz)   03/13/19 (!) 227.3 kg (501 lb)   02/19/19 (!) 229.5 kg (506 lb)   01/07/19 (!) 224.6 kg (495 lb 1.6 oz)   11/30/18 (!) 220.1 kg (485 lb 4.8 oz)   11/29/18 (!) 220 kg (485 lb)   10/30/18 (!) 215.1 kg (474 lb 1.6 oz)   10/25/18 (!) 211.4 kg (466 lb)   10/11/18 (!) 211.5 kg (466 lb 4.8 oz)   10/09/18 (!) 207 kg (456 lb 4.8 oz)   09/20/18 (!) 206.8 kg (456 lb)   09/11/18 (!) 210.7 kg (464 lb 9.6 oz)   07/25/18 (!) 212.4 kg (468 lb 3.2 oz)   07/19/18 (!) 210.5 kg (464 lb)   06/19/18 (!) 213.7 kg (471 lb 3.2 oz)   06/13/18 (!) 206.2 kg (454 lb 8 oz)   05/29/18 (!) 211.7 kg (466 lb 12.8 oz)   03/29/18 (!) 209.1 kg (460 lb 14.4 oz)   03/20/18 (!) 208.7 kg (460 lb)                 Current Outpatient Medications   Medication     acetaminophen (TYLENOL) 325 MG tablet     albuterol (PROAIR HFA/PROVENTIL HFA/VENTOLIN HFA) 108 (90 Base) MCG/ACT inhaler     bisacodyl (DULCOLAX) 10 MG suppository     blood glucose (ACCU-CHEK RON PLUS) test strip     blood glucose (NO BRAND SPECIFIED) lancets standard     blood glucose (NO BRAND SPECIFIED) test strip     blood glucose monitoring (IBG STAR) meter device kit     blood glucose monitoring (NO BRAND SPECIFIED) meter device kit     bumetanide (BUMEX) 1 MG tablet     buPROPion (WELLBUTRIN SR) 100 MG 12 hr tablet     capsaicin (ZOSTRIX) 0.025 % external cream     " ciclopirox (LOPROX) 0.77 % cream     ciclopirox (PENLAC) 8 % external solution     clindamycin (CLEOCIN) 300 MG capsule     clotrimazole (LOTRIMIN) 1 % external cream     colchicine (COLCYRS) 0.6 MG tablet     diclofenac (VOLTAREN) 1 % topical gel     dulaglutide (TRULICITY) 0.75 MG/0.5ML pen     dulaglutide (TRULICITY) 1.5 MG/0.5ML pen     DULoxetine (CYMBALTA) 20 MG capsule     Efinaconazole 10 % SOLN     empagliflozin (JARDIANCE) 25 MG TABS tablet     fluconazole (DIFLUCAN) 150 MG tablet     gabapentin (NEURONTIN) 300 MG capsule     hydrALAZINE (APRESOLINE) 25 MG tablet     insulin aspart (NOVOLOG FLEXPEN) 100 UNIT/ML pen     insulin glargine U-300 (TOUJEO SOLOSTAR) 300 UNIT/ML (1 units dial) pen     insulin pen needle (BD RIVER U/F) 32G X 4 MM miscellaneous     isosorbide dinitrate (ISORDIL) 20 MG tablet     levothyroxine (SYNTHROID/LEVOTHROID) 200 MCG tablet     metolazone (ZAROXOLYN) 2.5 MG tablet     metoprolol succinate ER (TOPROL-XL) 200 MG 24 hr tablet     nicotine (NICODERM CQ) 14 MG/24HR 24 hr patch     nystatin (MYCOSTATIN) 128159 UNIT/GM external cream     order for DME     order for DME     order for DME     ORDER FOR DME     oxyCODONE-acetaminophen (PERCOCET) 5-325 MG per tablet     polyethylene glycol (MIRALAX/GLYCOLAX) powder     potassium chloride ER (KLOR-CON M) 20 MEQ CR tablet     Respiratory Therapy Supplies (NEBULIZER COMPRESSOR) KIT     senna (SENOKOT) 8.6 MG tablet     spironolactone (ALDACTONE) 50 MG tablet     tiotropium (SPIRIVA HANDIHALER) 18 MCG inhalation capsule     topiramate (TOPAMAX) 50 MG tablet     morphine (MS CONTIN) 15 MG 12 hr tablet     No current facility-administered medications for this visit.      Facility-Administered Medications Ordered in Other Visits   Medication     sodium chloride (PF) 0.9% PF flush 10 mL

## 2020-08-13 NOTE — PROGRESS NOTES
Service Date: 2020      Pedro Bolton MD   [facility]    [address] [place] [address]      Medical Director   [facility]     [address] [place] [address]        Kristen Cuello NP   [facility]   [address], [address] [place] [address]       [facility]       NANETTE Chin   [facility] Endocrinology    [address]   [place] [address]      Jamilah Bernal MD   [facility] Internal Medicine   [address] [place] [address]      RE: Pricilla Brown    MRN: [id number]   : 1975       IMPRESSION:   1.  Morbid obesity with body mass index greater than 70.   2.  Poorly controlled diabetes mellitus.   3.  Obstructive sleep apnea.   4.  Chronic kidney disease.   5.  History of gout.   6.  Atrial fibrillation.   7.  Excessive daytime somnolence.      Dear Providers:      I had the opportunity to talk with your patient Pricilla Brown today for 30 minutes.      Briefly, Ms. Brown is a 45-year-old white female who weighs approximately 500 pounds.  She has intercurrent medical problems as noted above.  In addition, she has a history of medical noncompliance and difficulty in keeping medical appointments.  On the other hand, she is deeply desirous of obtaining bariatric surgery.      Currently, she is seen by Weight Loss, Diabetes, Internal Medicine, Cardiology and Sleep Medicine in a fragmented way, partially as a problem of structure within academic medical clinics.  I am therefore proposing that [facility] may be a better environment for her, where her healthcare needs can be more uniformly reinforced in a holistic way.  In addition, I believe that [facility] has perhaps more resources available to continue to  and support Pricilla.      When seen today, her weight is stable.  She has obtained a new sleep apnea mask, which has improved her excessive daytime somnolence.  We also  discussed the fact that it is virtually impossible to lose weight with undertreated sleep apnea.  I asked her to contact the Sleep people to further discuss her mask and as to whether she needs another sleep study now that she has a fitted mask.  She has been recently seen by the Diabetes Service.  She is on a multidrug regimen of oral and subcutaneous medications.  She does not have polyuria, polydipsia or polyphagia.  She has not been hospitalized for metabolic abnormalities.        She has no interim history of chest pain, chest tightness or paroxysmal nocturnal dyspnea.  She has chronic lymphedema.  She has had recently excessive daytime somnolence.  There is no history of arrhythmia other than palpitations that she associates with paroxysmal atrial fibrillation.  She has previously been on anticoagulation; however, she was dropped from the Anticoagulation Clinic because of inconsistent visits.  Prior to doing this, I talked with both the Coagulation Clinic and Ms. Brown and agreed that her use of anticoagulants in an unsupervised and irregular manner was potentially quite dangerous to her.      I reviewed her medications.  She is generally taking them as outlined.      She has had no laboratories drawn since October of last year.  We talked about laboratory regularity and the need for monitoring for medication toxicity.  I ordered laboratories for next week.      We had a 15 minute discussion about the advantages that [facility] could offer her.  She is willing to work in that environment.      We also talked about her no-show record and the fact that this would probably interfere with her getting bariatric surgery.  She is still a long way from being a candidate for bariatric surgery, as her weight would have to be down to the 450-470 range before bariatric surgery is likely to occur.      She has no interim history of chest pain, tightness or heaviness.  She sleeps many nights in a reclining chair.      She is  using COVID precautions.      Medications were reviewed and as are listed in the chart.        I recommended:   1.  A new patient visit at [facility].   2.  Followup phone call with us in 2 months.   3.  I would not reinstitute anticoagulants in an unsupervised manner.  She thoroughly understands the risks and benefits of both non-warfarin and warfarin oral anticoagulants.   4.  She is to call the Sleep Medicine people to schedule a followup visit at a suitable time after she has changed masks and to assess whether she needs augmented settings to achieve satisfactory sleep hygiene and outcomes.      I would ask [facility] to make a new patient appointment for her.  Were I doing primary care, this is a patient that I would have come back every week for 6-8 weeks to support her in her quest to change her lifestyle and choices about followup.  She understands that I believe that should she not show up at medical appointments that she is eliminating the opportunity to have bariatric surgery.      Were she to get to the required 450-460 pound level, she will need a CT coronary angiogram at minimum in view of her history of multiple poorly treated risk factors.      I will talk with her again in 6-8 weeks.      Virtual non-video telephone visit time:  30 minutes      Sincerely yours,      MD CHERYL Brooks MD             D: 2020   T: 2020   MT: kiana      Name:     BRAXTON SALMERON   MRN:      [id number]        Account:      [id number]   :      1975           Service Date: 2020      Document: [id number]

## 2020-08-14 ENCOUNTER — TELEPHONE (OUTPATIENT)
Dept: ENDOCRINOLOGY | Facility: CLINIC | Age: 45
End: 2020-08-14

## 2020-08-14 NOTE — TELEPHONE ENCOUNTER
CLINIC COORDINATOR SCHEDULING NOTES    CALL RESULT: LVM    APPT TYPE: VIRTUAL VISIT RETURN    PROVIDER: Archibald    DATE APPT NEEDED: late August 2020

## 2020-08-19 DIAGNOSIS — I48.20 CHRONIC ATRIAL FIBRILLATION (H): ICD-10-CM

## 2020-08-19 DIAGNOSIS — E11.9 DIABETES (H): Primary | ICD-10-CM

## 2020-08-19 DIAGNOSIS — I42.9 CARDIOMYOPATHY, UNSPECIFIED TYPE (H): ICD-10-CM

## 2020-08-19 DIAGNOSIS — I50.23 ACUTE ON CHRONIC SYSTOLIC HEART FAILURE (H): ICD-10-CM

## 2020-08-19 DIAGNOSIS — I50.32 CHRONIC DIASTOLIC HEART FAILURE (H): ICD-10-CM

## 2020-08-19 DIAGNOSIS — R53.83 FEELING TIRED: ICD-10-CM

## 2020-08-19 DIAGNOSIS — E78.5 HYPERLIPIDEMIA: ICD-10-CM

## 2020-08-19 DIAGNOSIS — D50.9 IRON DEFICIENCY ANEMIA, UNSPECIFIED IRON DEFICIENCY ANEMIA TYPE: ICD-10-CM

## 2020-08-19 LAB
ALBUMIN SERPL-MCNC: 2.9 G/DL (ref 3.4–5)
ALP SERPL-CCNC: 73 U/L (ref 40–150)
ALT SERPL W P-5'-P-CCNC: 30 U/L (ref 0–50)
ANION GAP SERPL CALCULATED.3IONS-SCNC: 6 MMOL/L (ref 3–14)
AST SERPL W P-5'-P-CCNC: 13 U/L (ref 0–45)
BASOPHILS # BLD AUTO: 0.1 10E9/L (ref 0–0.2)
BASOPHILS NFR BLD AUTO: 0.5 %
BILIRUB SERPL-MCNC: 0.5 MG/DL (ref 0.2–1.3)
BUN SERPL-MCNC: 14 MG/DL (ref 7–30)
CALCIUM SERPL-MCNC: 8.7 MG/DL (ref 8.5–10.1)
CHLORIDE SERPL-SCNC: 95 MMOL/L (ref 94–109)
CHOLEST SERPL-MCNC: 206 MG/DL
CO2 SERPL-SCNC: 26 MMOL/L (ref 20–32)
CREAT SERPL-MCNC: 1 MG/DL (ref 0.52–1.04)
DIFFERENTIAL METHOD BLD: ABNORMAL
EOSINOPHIL # BLD AUTO: 0.1 10E9/L (ref 0–0.7)
EOSINOPHIL NFR BLD AUTO: 1 %
ERYTHROCYTE [DISTWIDTH] IN BLOOD BY AUTOMATED COUNT: 13.4 % (ref 10–15)
GFR SERPL CREATININE-BSD FRML MDRD: 67 ML/MIN/{1.73_M2}
GLUCOSE SERPL-MCNC: 472 MG/DL (ref 70–99)
HBA1C MFR BLD: >14 % (ref 0–5.6)
HCT VFR BLD AUTO: 48.8 % (ref 35–47)
HDLC SERPL-MCNC: 47 MG/DL
HGB BLD-MCNC: 15.6 G/DL (ref 11.7–15.7)
IMM GRANULOCYTES # BLD: 0.1 10E9/L (ref 0–0.4)
IMM GRANULOCYTES NFR BLD: 0.6 %
INR PPP: 0.99 (ref 0.86–1.14)
IRON SATN MFR SERPL: 24 % (ref 15–46)
IRON SERPL-MCNC: 64 UG/DL (ref 35–180)
LDLC SERPL CALC-MCNC: 116 MG/DL
LYMPHOCYTES # BLD AUTO: 1.7 10E9/L (ref 0.8–5.3)
LYMPHOCYTES NFR BLD AUTO: 18 %
MCH RBC QN AUTO: 30.6 PG (ref 26.5–33)
MCHC RBC AUTO-ENTMCNC: 32 G/DL (ref 31.5–36.5)
MCV RBC AUTO: 96 FL (ref 78–100)
MONOCYTES # BLD AUTO: 0.6 10E9/L (ref 0–1.3)
MONOCYTES NFR BLD AUTO: 6.6 %
NEUTROPHILS # BLD AUTO: 6.8 10E9/L (ref 1.6–8.3)
NEUTROPHILS NFR BLD AUTO: 73.3 %
NONHDLC SERPL-MCNC: 159 MG/DL
NRBC # BLD AUTO: 0 10*3/UL
NRBC BLD AUTO-RTO: 0 /100
NT-PROBNP SERPL-MCNC: 100 PG/ML (ref 0–125)
PLATELET # BLD AUTO: 243 10E9/L (ref 150–450)
POTASSIUM SERPL-SCNC: 4.4 MMOL/L (ref 3.4–5.3)
PROT SERPL-MCNC: 8 G/DL (ref 6.8–8.8)
RBC # BLD AUTO: 5.1 10E12/L (ref 3.8–5.2)
SODIUM SERPL-SCNC: 128 MMOL/L (ref 133–144)
TIBC SERPL-MCNC: 270 UG/DL (ref 240–430)
TRIGL SERPL-MCNC: 216 MG/DL
TSH SERPL DL<=0.005 MIU/L-ACNC: 17.34 MU/L (ref 0.4–4)
WBC # BLD AUTO: 9.3 10E9/L (ref 4–11)

## 2020-08-20 DIAGNOSIS — E11.65 TYPE 2 DIABETES MELLITUS WITH HYPERGLYCEMIA, WITH LONG-TERM CURRENT USE OF INSULIN (H): ICD-10-CM

## 2020-08-20 DIAGNOSIS — E11.42 TYPE 2 DIABETES MELLITUS WITH DIABETIC POLYNEUROPATHY, WITHOUT LONG-TERM CURRENT USE OF INSULIN (H): ICD-10-CM

## 2020-08-20 DIAGNOSIS — Z79.4 TYPE 2 DIABETES MELLITUS WITH HYPERGLYCEMIA, WITH LONG-TERM CURRENT USE OF INSULIN (H): ICD-10-CM

## 2020-08-24 DIAGNOSIS — B37.31 YEAST INFECTION OF THE VAGINA: ICD-10-CM

## 2020-08-24 RX ORDER — INSULIN GLARGINE 300 U/ML
INJECTION, SOLUTION SUBCUTANEOUS
Qty: 27 ML | Refills: 3 | Status: SHIPPED | OUTPATIENT
Start: 2020-08-24 | End: 2020-08-28

## 2020-08-24 RX ORDER — DULAGLUTIDE 0.75 MG/.5ML
INJECTION, SOLUTION SUBCUTANEOUS
Qty: 2 ML | Refills: 0 | Status: SHIPPED | OUTPATIENT
Start: 2020-08-24 | End: 2021-11-23

## 2020-08-24 NOTE — TELEPHONE ENCOUNTER
insulin glargine U-300 (TOUJEO SOLOSTAR) 300 UNIT/ML (1 units dial) pen   Inject 190 units subcutaneous each am.    Last Written Prescription Date:  4/16/20-8/12/20  Last Fill Quantity: 30 ml  # refills: 3    Routing refill request to provider for review/approval because:  Not on refill protocol. Dosage not active on med list.       Last Office Visit : 8/12/20 virtual visit   TYPE 2 DIABETES MELLITUS: Uncontrolled type 2 diabetes mellitus.  Pricilla is to check her blood sugar 4 times daily due to poorly controlled diabetes and fluctuation in her glycemia control.  She was instructed to increase Toujeo 195 units subcutaneous each am, take her Novolog 85 units, plus correction with meals, Trulicity 1.5 mg subcutaneous weekly and increase Jardiance 25 mg each am.  Future Office visit:  8/28/20        dulaglutide (TRULICITY) 0.75 MG/0.5ML pen   Inject 0.75 mg Subcutaneous every 7 days       Last Written Prescription Date:  8/11/20  Last Fill Quantity: 3 ml,   # refills: 0    Routing refill request to provider for review/approval because:  Inconsistent dose/directions - per last visit 8/12/20 virtual visit, Trulicity 1.5 mg subcutaneous weekly

## 2020-08-25 ENCOUNTER — PATIENT OUTREACH (OUTPATIENT)
Dept: CARDIOLOGY | Facility: CLINIC | Age: 45
End: 2020-08-25

## 2020-08-25 ENCOUNTER — OFFICE VISIT (OUTPATIENT)
Dept: OPHTHALMOLOGY | Facility: CLINIC | Age: 45
End: 2020-08-25
Payer: MEDICARE

## 2020-08-25 DIAGNOSIS — H52.03 HYPEROPIA OF BOTH EYES: ICD-10-CM

## 2020-08-25 DIAGNOSIS — R79.89 ELEVATED TSH: Primary | ICD-10-CM

## 2020-08-25 DIAGNOSIS — E11.9 DIABETES (H): ICD-10-CM

## 2020-08-25 DIAGNOSIS — H25.013 CORTICAL SENILE CATARACT OF BOTH EYES: Primary | ICD-10-CM

## 2020-08-25 DIAGNOSIS — E11.3292 MILD NONPROLIFERATIVE DIABETIC RETINOPATHY OF LEFT EYE WITHOUT MACULAR EDEMA ASSOCIATED WITH TYPE 2 DIABETES MELLITUS (H): ICD-10-CM

## 2020-08-25 RX ORDER — CYCLOBENZAPRINE HCL 10 MG
10 TABLET ORAL DAILY
COMMUNITY
Start: 2020-07-28 | End: 2021-09-09

## 2020-08-25 ASSESSMENT — VISUAL ACUITY
OD_PH_SC: 20/30
METHOD: SNELLEN - LINEAR
OS_PH_SC+: -1
OD_SC+: -2
OS_PH_SC: 20/25
OD_PH_SC+: -1
OD_SC: 20/80
OS_SC: 20/70

## 2020-08-25 ASSESSMENT — REFRACTION_WEARINGRX
OS_ADD: +1.25
OS_CYLINDER: +0.50
OD_CYLINDER: +0.75
SPECS_TYPE: PAL
OS_AXIS: 110
OS_SPHERE: -0.25
OD_SPHERE: -0.75
OD_AXIS: 110
OD_ADD: +1.25

## 2020-08-25 ASSESSMENT — REFRACTION_MANIFEST
OS_AXIS: 103
OS_SPHERE: -0.25
OD_SPHERE: -1.00
OS_CYLINDER: +1.00
OD_CYLINDER: +1.00
OD_AXIS: 101

## 2020-08-25 ASSESSMENT — CONF VISUAL FIELD
OD_NORMAL: 1
OS_NORMAL: 1

## 2020-08-25 ASSESSMENT — REFRACTION
OD_AXIS: 20/60+1
OS_CYLINDER: SPHERE
OS_AXIS: 20/30-2
OS_SPHERE: +2.75
OD_CYLINDER: SPHERE
OD_SPHERE: +1.75

## 2020-08-25 ASSESSMENT — TONOMETRY
IOP_METHOD: ICARE
OS_IOP_MMHG: 16
OD_IOP_MMHG: 17

## 2020-08-25 ASSESSMENT — SLIT LAMP EXAM - LIDS
COMMENTS: NORMAL
COMMENTS: NORMAL

## 2020-08-25 ASSESSMENT — EXTERNAL EXAM - RIGHT EYE: OD_EXAM: NORMAL

## 2020-08-25 ASSESSMENT — CUP TO DISC RATIO
OD_RATIO: 0.3
OS_RATIO: 0.4

## 2020-08-25 ASSESSMENT — EXTERNAL EXAM - LEFT EYE: OS_EXAM: NORMAL

## 2020-08-25 NOTE — NURSING NOTE
Chief Complaints and History of Present Illnesses   Patient presents with     Diabetic Retinopathy Evaluation     Chief Complaint(s) and History of Present Illness(es)     Diabetic Retinopathy Evaluation     Laterality: both eyes    Onset: sudden    Onset: 1 month ago    Quality: blurred vision and difficulty focusing    Severity: moderate    Course: gradually worsening    Associated symptoms: dryness and tearing.  Negative for eye pain    Treatments tried: no treatments    Pain scale: 0/10              Comments     DMII Comprehensive Eye Exam    Vision suddenly decreased after tonsil infection last month. Has ongoing issues w/ insulin dosage/prescription and has an appt w/IM after this visit.     C/o dry and at times teary eyes. Would like tx/gtts suggestions.    Last BGL  472 08/19/2020  Last A1c  14.0  08/19/2020     Bernie Larson COT 12:59 PM August 25, 2020

## 2020-08-25 NOTE — PROGRESS NOTES
History  HPI     Diabetic Retinopathy Evaluation     In both eyes.  Onset was sudden.  This started 1 month ago.  Charactertized as  blurred vision and difficulty focusing.  Severity is moderate.  Since onset it is gradually worsening.  Associated symptoms include dryness and tearing.  Negative for eye pain.  Treatments tried include no treatments.  Pain was noted as 0/10.              Comments     DMII Comprehensive Eye Exam    Vision suddenly decreased after tonsil infection last month. Has ongoing issues w/ insulin dosage/prescription and has an appt w/IM after this visit.     C/o dry and at times teary eyes. Would like tx/gtts suggestions.    Last BGL  472 08/19/2020  Last A1c  14.0  08/19/2020     Bernie Larson COT 12:59 PM August 25, 2020             Last edited by Bernie Larson on 8/25/2020  1:08 PM. (History)          Assessment/Plan  (H25.013) Cortical senile cataract of both eyes  (primary encounter diagnosis)  Comment: Visually significant right eye > left eye   Plan:  Educated patient on clinical findings. Referred to Dr. Das for cataract consultation.    (E11.2042) Mild nonproliferative diabetic retinopathy of left eye without macular edema associated with type 2 diabetes mellitus (H)  Comment: Mild NPDR left eye, no retinopathy right eye   Plan:  Educated patient on clinical findings and the importance of continued management with primary care physician. Continue management as directed and return to clinic in 1 year for dilated exam, or sooner, as needed.    (H52.03) Hyperopia of both eyes  Comment: Hyperopia both eyes, difficult refraction due to lens opacities  Plan: REFRACTION         Recommended +2.75 reading glasses for distance vision while awaiting cataract surgery.    Complete documentation of historical and exam elements from today's encounter can  be found in the full encounter summary report (not reduplicated in this progress  note). I personally obtained the chief complaint(s) and  history of present illness. I  confirmed and edited as necessary the review of systems, past medical/surgical  history, family history, social history, and examination findings as documented by  others; and I examined the patient myself. I personally reviewed the relevant tests,  images, and reports as documented above. I formulated and edited as necessary the  assessment and plan and discussed the findings and management plan with the  patient and family.    Russel Vergara OD, FAAO

## 2020-08-25 NOTE — TELEPHONE ENCOUNTER
Lab results reviewed and new orders received.   Date: 8/25/2020    Time of Call: 9:59 AM     Diagnosis:  Diabetes, elevated TSH, hyperlipidemia     [ VORB ] Ordering provider: Dr. Alcala  Order: Patient to see endocrinology and primary care physician urgently.     Order received by: Alexandru MARIE RN     Follow-up/additional notes: Referral for endocrine placed. Patient was called and notified of results and of recommendation for patient to see endocrinologist and primary care physician urgently.

## 2020-08-26 NOTE — TELEPHONE ENCOUNTER
FUTURE VISIT INFORMATION      FUTURE VISIT INFORMATION:    Date: 9/22/20    Time: 8:20am    Location: [facility]  REFERRAL INFORMATION:    Referring provider:  Ursula    Referring providers clinic:  [facility]    Reason for visit/diagnosis  Cataract Consultation     RECORDS REQUESTED FROM:       Clinic name Comments Records Status Imaging Status   [facility] Ov/referral 8/25/20  OV/notes 2/11/14-8/25/20 EPIC

## 2020-08-27 NOTE — PROGRESS NOTES
"Pricilla Brown is a 45 year old female who is being evaluated via a billable telephone visit.      The patient has been notified of following:     \"This telephone visit will be conducted via a call between you and your physician/provider. We have found that certain health care needs can be provided without the need for a physical exam.  This service lets us provide the care you need with a short phone conversation.  If a prescription is necessary we can send it directly to your pharmacy.  If lab work is needed we can place an order for that and you can then stop by our lab to have the test done at a later time.    Telephone visits are billed at different rates depending on your insurance coverage. During this emergency period, for some insurers they may be billed the same as an in-person visit.  Please reach out to your insurance provider with any questions.    If during the course of the call the physician/provider feels a telephone visit is not appropriate, you will not be charged for this service.\"    Patient has given verbal consent for Telephone visit?  Yes    What phone number would you like to be contacted at? [phone]    How would you like to obtain your AVS? Haja Desai MA    Patients Glucose Data was Patient did not answer after multiple attempts. Unable to obtain data       Due to the COVID 19 pandemic this visit was converted to a telephone visit in order to help prevent spread of infection in this patient and the general population.    Time of start: 11:06 am  Time of end: 11:27 am  Total duration of telephone visit: 21 minutes.    HPI  Pricilla Brown is a 45 year old female with type 2 diabetes mellitus. Telephone visit today for diabetes follow up.  She [facility].  Pt's hx is also significant for morbid obesity, dilated cardiomyopathy with diastolic dysfunction, atrial fibrillation, hx of PE, HTN, Grave's disease, peripheral neuropathy, mild NPDR left eye " and sleep apnea.  Pricilla was admitted in early Aug 2020 with left peritonsillar abscess s/p I & D.  During her hospitalization, an A1C was done which was 16 %.  For her diabetes, pt is prescribed to take Trulicity 1.5 mg SQ once a week,Toujeo 195 units SQ each am and Novolog 85 units with meals, plus correction insulin ( 2 units/50 for BG > 150 and Jardiance 25 mg daily.  She denies missing insulin doses since her last virtual visit with me.    I have no glucose meter download data today.  Pt tells me her FBS are in the 219-250 range and her blood sugars later in the day are in the 300-400 range.  No hypoglycemia.  On ROS today, throat is feeling better.  No fevers or chills at this time.  Pt has chronic SOB and is using her CPAP. Mild cough. She is smoking.  She has numbness and tingling in both feet and hands.  Pt denies foot ulcers at this time.  Some dysuria; no hematuria.  She denies blurred vision, chest pain, abd pain or diarrhea.  Pt reports vaginal yeast infection at this time.    ROS  Please see under HPI.    Allergies  Allergies   Allergen Reactions     Penicillins Other (See Comments) and Unknown     CHILDHOOD ALLERGY  CHILDHOOD ALLERGY     Ibuprofen Hives and Rash     Ibuprofen Sodium Hives and Rash       Medications  Current Outpatient Medications   Medication Sig Dispense Refill     insulin aspart (NOVOLOG FLEXPEN) 100 UNIT/ML pen Inject  90  units with meals,plus correction.Pt uses approx 280 units/24 hrs. 250 mL 3     insulin glargine U-300 (TOUJEO SOLOSTAR) 300 UNIT/ML (1 units dial) pen INJECT 200 UNITS UNDER THE SKIN EVERY MORNING 27 mL 3     acetaminophen (TYLENOL) 325 MG tablet Take 2 tablets (650 mg) by mouth 3 times daily as needed for mild pain or fever (total acetaminophen dose should not exceed 3000 mg per day) 180 tablet 0     albuterol (PROAIR HFA/PROVENTIL HFA/VENTOLIN HFA) 108 (90 Base) MCG/ACT inhaler Inhale 2 puffs into the lungs every 6 hours as needed for shortness of breath /  dyspnea 75 g 0     bisacodyl (DULCOLAX) 10 MG suppository Place 1 suppository (10 mg) rectally daily as needed for constipation 6 suppository 0     blood glucose (ACCU-CHEK RON PLUS) test strip Use to test blood sugar 4  times daily or as directed. 360 each 3     blood glucose (NO BRAND SPECIFIED) lancets standard USE TO CHECK  blood sugar fasting each am  prelunch and predinner daily OR AS DIRECTED Call clinic to schedule MD APPT. 400 each 3     blood glucose (NO BRAND SPECIFIED) test strip Use to test blood sugars 3 times daily or as directed 400 strip 3     blood glucose monitoring (IBG STAR) meter device kit -PLEASE GIVE PATIENT A DEVICE HER INSURANCE WILL COVER- 1 kit 0     blood glucose monitoring (NO BRAND SPECIFIED) meter device kit Use to test blood sugar 3 times daily or as directed. 1 kit 1     bumetanide (BUMEX) 1 MG tablet Take 5 tablets (5 mg) by mouth 2 times daily 300 tablet 11     buPROPion (WELLBUTRIN SR) 100 MG 12 hr tablet Take 1 tablet (100 mg) by mouth 2 times daily 180 tablet 0     capsaicin (ZOSTRIX) 0.025 % external cream Apply 1 g topically 3 times daily as needed (use for neuropathy pain) 1 Tube 1     ciclopirox (LOPROX) 0.77 % cream Apply topically 2 times daily To feet and toenails. 90 g 6     ciclopirox (PENLAC) 8 % external solution Apply topically daily To toenails. 6.6 mL 0     clindamycin (CLEOCIN) 300 MG capsule        clotrimazole (LOTRIMIN) 1 % external cream Apply topically 2 times daily as needed (skin irritation) 30 g 0     colchicine (COLCYRS) 0.6 MG tablet Take 1-2 tablets (0.6-1.2 mg) by mouth daily as needed for moderate pain 180 tablet 0     cyclobenzaprine (FLEXERIL) 10 MG tablet Take 10 mg by mouth daily       diclofenac (VOLTAREN) 1 % topical gel Apply 4 grams to knees or 2 grams to hands four times daily using enclosed dosing card. 100 g 1     dulaglutide (TRULICITY) 1.5 MG/0.5ML pen Inject 1.5 mg Subcutaneous every 7 days 6 mL 3     DULoxetine (CYMBALTA) 20 MG  capsule Take 1 capsule (20 mg) by mouth 2 times daily 60 capsule 0     Efinaconazole 10 % SOLN Externally apply topically daily To toenails. 8 mL 11     empagliflozin (JARDIANCE) 25 MG TABS tablet Take 1 tablet (25 mg) by mouth daily 90 tablet 3     fluconazole (DIFLUCAN) 150 MG tablet TAKE ONE TABLET BY MOUTH ONCE FOR 1 DOSE 1 tablet 0     gabapentin (NEURONTIN) 300 MG capsule Take 2 capsules (600 mg) by mouth 2 times daily 360 capsule 1     hydrALAZINE (APRESOLINE) 25 MG tablet Take 50-75 mg by mouth Take 2 tabs (50 mg) in am, 2 tabs (50 mg) midday, and 3 tabs (75 mg) in pm daily  450 tablet 3     insulin pen needle (BD RIVER U/F) 32G X 4 MM miscellaneous Use 6 daily or as directed 600 each 3     isosorbide dinitrate (ISORDIL) 20 MG tablet Take 1 tablet (20 mg) by mouth 3 times daily 270 tablet 1     levothyroxine (SYNTHROID/LEVOTHROID) 200 MCG tablet TAKE ONE TABLET BY MOUTH DAILY MONDAY THROUGH SATURDAY, AND TWO TABLETS ON SUNDAYS 102 tablet 1     metolazone (ZAROXOLYN) 2.5 MG tablet Take 1 tablet (2.5 mg) by mouth 2 times daily Take 1/2 hour prior to torsemide 180 tablet 3     metoprolol succinate ER (TOPROL-XL) 200 MG 24 hr tablet Take 1 tablet (200 mg) by mouth daily 90 tablet 2     morphine (MS CONTIN) 15 MG 12 hr tablet Take 15 mg by mouth daily as needed       nicotine (NICODERM CQ) 14 MG/24HR 24 hr patch Place 1 patch onto the skin every 24 hours 90 patch 0     nystatin (MYCOSTATIN) 625133 UNIT/GM external cream Apply topically 2 times daily To toenails 90 g 6     order for DME Left foot 1 Units 0     order for DME Equipment being ordered: [id number] [id number] $92   Plantar, fasciitis, LG, night splint 1 each 0     order for DME Equipment being ordered: Challenger Wide walker if available - patient needs seat, basket and brakes. 1 each 0     ORDER FOR DME Use your CPAP device as directed by your provider. Pressure change to min 13 max 18cwp 1 each 99     oxyCODONE-acetaminophen (PERCOCET) 5-325 MG per tablet  Take 1 tablet by mouth every 8 hours as needed for moderate to severe pain (try to limit use, no further prescriptions until seen in pain clinic) 60 tablet 0     polyethylene glycol (MIRALAX/GLYCOLAX) powder        potassium chloride ER (KLOR-CON M) 20 MEQ CR tablet Take 2 tablets (40 mEq) by mouth 4 times daily 240 tablet 6     Respiratory Therapy Supplies (NEBULIZER COMPRESSOR) KIT 1 Device 4 times daily as needed. 1 kit 3     senna (SENOKOT) 8.6 MG tablet Take 1 tablet by mouth 2 times daily 45 tablet 0     spironolactone (ALDACTONE) 50 MG tablet Take 1 tablet (50 mg) by mouth daily 90 tablet 1     tiotropium (SPIRIVA HANDIHALER) 18 MCG inhalation capsule Inhale contents of one capsule daily. 30 capsule 1     topiramate (TOPAMAX) 50 MG tablet Take 3 tablets (150 mg) by mouth daily 90 tablet 1     TRULICITY 0.75 MG/0.5ML pen INJECT THE CONTENTS OF ONE SYRINGE ONCE DAILY EVERY 7 DAYS 2 mL 0       Family History  family history includes Cerebrovascular Disease in her mother; Diabetes in her brother, mother, and sister; Heart Disease in her brother, father, mother, and sister; Hypertension in her mother; Psychotic Disorder in her brother; Thyroid Disease in her brother, maternal aunt, maternal uncle, mother, sister, sister, and sister.    Social History  Smoke: yes.  ETOH: rare.  Lives with significant other.  No children.      Past Medical History  Past Medical History:   Diagnosis Date     A-fib (H) 2011    on coumadin since 1/13     Asthma     as a kid     Chest pain 2/1/2017     Chronic anticoagulation for a-fib 2/15/2013    INR's followed by coumadin clinic at      Diabetes mellitus (H) 2012     Diastolic heart failure 2/15/2013     Dilated cardiomyopathy (H) 1/8/2013     HTN (hypertension)      Hyperthyroidism     Graves, s/p I131 1/13, now on prednisone and methimazole     Morbid obesity (H)      Pulmonary embolism (H) 1/12    hospitalized in [place], on lovenox/coumadin for a few months but stopped, hypercoag  w/u neg per pt     Sleep apnea     using CPAP       Physical Exam    No exam today.    RESULTS  Creatinine   Date Value Ref Range Status   08/19/2020 1.00 0.52 - 1.04 mg/dL Final     GFR Estimate   Date Value Ref Range Status   08/19/2020 67 >60 mL/min/[1.73_m2] Final     Comment:     Non  GFR Calc  Starting 12/18/2018, serum creatinine based estimated GFR (eGFR) will be   calculated using the Chronic Kidney Disease Epidemiology Collaboration   (CKD-EPI) equation.       Hemoglobin A1C   Date Value Ref Range Status   08/19/2020 >14.0 (H) 0 - 5.6 % Final     Comment:     Normal <5.7% Prediabetes 5.7-6.4%  Diabetes 6.5% or higher - adopted from ADA   consensus guidelines.       Potassium   Date Value Ref Range Status   08/19/2020 4.4 3.4 - 5.3 mmol/L Final     ALT   Date Value Ref Range Status   08/19/2020 30 0 - 50 U/L Final     AST   Date Value Ref Range Status   08/19/2020 13 0 - 45 U/L Final     TSH   Date Value Ref Range Status   08/19/2020 17.34 (H) 0.40 - 4.00 mU/L Final     T4 Free   Date Value Ref Range Status   10/11/2018 0.95 0.76 - 1.46 ng/dL Final       Cholesterol   Date Value Ref Range Status   08/19/2020 206 (H) <200 mg/dL Final     Comment:     Desirable:       <200 mg/dl   10/11/2018 136 <200 mg/dL Final     HDL Cholesterol   Date Value Ref Range Status   08/19/2020 47 (L) >49 mg/dL Final   10/11/2018 37 (L) >49 mg/dL Final     LDL Cholesterol Calculated   Date Value Ref Range Status   08/19/2020 116 (H) <100 mg/dL Final     Comment:     Above desirable:  100-129 mg/dl  Borderline High:  130-159 mg/dL  High:             160-189 mg/dL  Very high:       >189 mg/dl     10/11/2018 70 <100 mg/dL Final     Comment:     Desirable:       <100 mg/dl     Triglycerides   Date Value Ref Range Status   08/19/2020 216 (H) <150 mg/dL Final     Comment:     Borderline high:  150-199 mg/dl  High:             200-499 mg/dl  Very high:       >499 mg/dl     10/11/2018 143 <150 mg/dL Final      Cholesterol/HDL Ratio   Date Value Ref Range Status   07/14/2015 3.4 0.0 - 5.0 Final   01/06/2013 3.6 0.0 - 5.0 Final     A1C > 16   8/2020  A1C 10.1   7/24/2019  A1C  8.4    10/11/2018  A1C  8.9    2/7/2018  A1C  8.8     2/7/2017  A1C  9.0    11/10/2016  A1C 10.7   6/16/2016  A1C 12.1   3/10/2016    ASSESSMENT/PLAN:    1.  TYPE 2 DIABETES MELLITUS: Uncontrolled type 2 diabetes mellitus.  Pricilla is to check her blood sugar 4 times daily due to poorly controlled diabetes and fluctuation in her glycemia control.  She was instructed to increase Toujeo 200 units subcutaneous each am, take her Novolog 90 units, plus correction with meals, Trulicity 1.5 mg subcutaneous weekly and increase Jardiance 25 mg each am.  I plan to call her again in 4 days on 9/1/2020 and will make additional insulin adjustments if needed.  May need to consider using U500 insulin  She did not tolerate Metformin due to GI distress.  Pt's LDL is 70 in Oct 2018. She is not taking a statin at this time.    2. RETINOPATHY: Seen here by [facility] on 8/25/2020 and dx with mild NPDR left eye withour macular edam. No retinopathy in right eye.    3.  HTN: No vitals today.Continue current RXs.     4.  CARDIOMYOPATHY/A FIB:  Pt followed here by Cardiology staff.      5. GRAVE'S DISEASE:  Hx of Grave's disease s/p I 131 in Jan 2013.    Pt became hypothyroid following above treatment and is taking  Levothyroxine 200 mcg 1 pill Monday - Saturday and 2 pills on Sundays.  Her TSH was normal in Oct 2018.  TSH was high on 8/19/2020. Will have pt take Levothyroxine 200 mcg 1 tab Monday- Friday and 2 tabs on Saturday and Sundays.  Recheck TSH in 8-10 weeks.    6.  OBESITY:  Morbid obesity with BMI > 70 kg/m2.  [facility] by [facility] staff.  She needs to lose weight prior to being considered for bariatric surgery.    7.  FOLLOW UP : with me ion 9/1/2020.  Reminded pt I have ordered labs.

## 2020-08-28 ENCOUNTER — VIRTUAL VISIT (OUTPATIENT)
Dept: ENDOCRINOLOGY | Facility: CLINIC | Age: 45
End: 2020-08-28
Payer: MEDICARE

## 2020-08-28 ENCOUNTER — TELEPHONE (OUTPATIENT)
Dept: ENDOCRINOLOGY | Facility: CLINIC | Age: 45
End: 2020-08-28

## 2020-08-28 DIAGNOSIS — E11.65 TYPE 2 DIABETES MELLITUS WITH HYPERGLYCEMIA, WITH LONG-TERM CURRENT USE OF INSULIN (H): ICD-10-CM

## 2020-08-28 DIAGNOSIS — E11.42 TYPE 2 DIABETES MELLITUS WITH DIABETIC POLYNEUROPATHY, WITHOUT LONG-TERM CURRENT USE OF INSULIN (H): ICD-10-CM

## 2020-08-28 DIAGNOSIS — E03.8 OTHER SPECIFIED HYPOTHYROIDISM: ICD-10-CM

## 2020-08-28 DIAGNOSIS — B37.31 YEAST INFECTION OF THE VAGINA: Primary | ICD-10-CM

## 2020-08-28 DIAGNOSIS — Z79.4 TYPE 2 DIABETES MELLITUS WITH HYPERGLYCEMIA, WITH LONG-TERM CURRENT USE OF INSULIN (H): ICD-10-CM

## 2020-08-28 RX ORDER — INSULIN GLARGINE 300 U/ML
INJECTION, SOLUTION SUBCUTANEOUS
Qty: 27 ML | Refills: 3 | Status: SHIPPED | OUTPATIENT
Start: 2020-08-28 | End: 2020-09-01

## 2020-08-28 RX ORDER — FLUCONAZOLE 150 MG/1
150 TABLET ORAL ONCE
Qty: 1 TABLET | Refills: 0 | Status: SHIPPED | OUTPATIENT
Start: 2020-08-28 | End: 2020-08-28

## 2020-08-28 RX ORDER — INSULIN ASPART 100 [IU]/ML
INJECTION, SOLUTION INTRAVENOUS; SUBCUTANEOUS
Qty: 250 ML | Refills: 3 | Status: SHIPPED | OUTPATIENT
Start: 2020-08-28 | End: 2020-09-01

## 2020-08-28 RX ORDER — LEVOTHYROXINE SODIUM 200 UG/1
TABLET ORAL
Qty: 116 TABLET | Refills: 1 | Status: SHIPPED | OUTPATIENT
Start: 2020-08-28 | End: 2021-01-08

## 2020-08-28 NOTE — TELEPHONE ENCOUNTER
----- Message from Isela Archibald PA-C sent at 8/28/2020  1:33 PM CDT -----  Could you please call pt and I noticed that Cardiology checked her TSH which was elevated.  I would like her to take her current Levothyroxine dose 1 tab Monday thru Friday and 2 tabs on Saturday and Sunday and have her TSH rechecked the first week in Nov 2020. I ordered the TSH.  Thelma

## 2020-08-28 NOTE — TELEPHONE ENCOUNTER
Pt notified via [employer]. Rx refill sent.   Linn Kelly, RN on 8/28/2020 at 2:36 PM      Isela Vasquez PA-C Schwendeman, Connie M, RN; Linn Kelly, RN               Could you please call pt and I noticed that Cardiology checked her TSH which was elevated.   I would like her to take her current Levothyroxine dose 1 tab Monday thru Friday and 2 tabs on Saturday and Sunday and have her TSH rechecked the first week in Nov 2020. I ordered the TSH.   Thelma

## 2020-08-28 NOTE — LETTER
"8/28/2020       RE: Pricilla Brown  [address]  [facility] [id number]     Dear Colleague,    Thank you for referring your patient, Pricilla Brown, to the [facility] at [facility]. Please see a copy of my visit note below.    Pricilla Brown is a 45 year old female who is being evaluated via a billable telephone visit.      The patient has been notified of following:     \"This telephone visit will be conducted via a call between you and your physician/provider. We have found that certain health care needs can be provided without the need for a physical exam.  This service lets us provide the care you need with a short phone conversation.  If a prescription is necessary we can send it directly to your pharmacy.  If lab work is needed we can place an order for that and you can then stop by our lab to have the test done at a later time.    Telephone visits are billed at different rates depending on your insurance coverage. During this emergency period, for some insurers they may be billed the same as an in-person visit.  Please reach out to your insurance provider with any questions.    If during the course of the call the physician/provider feels a telephone visit is not appropriate, you will not be charged for this service.\"    Patient has given verbal consent for Telephone visit?  Yes    What phone number would you like to be contacted at? [phone]    How would you like to obtain your AVS? Haja Desai MA    Patients Glucose Data was Patient did not answer after multiple attempts. Unable to obtain data       Due to the COVID 19 pandemic this visit was converted to a telephone visit in order to help prevent spread of infection in this patient and the general population.    Time of start: 11:06 am  Time of end: 11:27 am  Total duration of telephone visit: 21 minutes.    HPI  Pricilla Brown is a 45 year old female with type 2 diabetes " mellitus. Telephone visit today for diabetes follow up.  She is also [facility] in the [facility].  Pt's hx is also significant for morbid obesity, dilated cardiomyopathy with diastolic dysfunction, atrial fibrillation, hx of PE, HTN, Grave's disease, peripheral neuropathy, mild NPDR left eye and sleep apnea.  Pricilla was admitted in early Aug 2020 with left peritonsillar abscess s/p I & D.  During her hospitalization, an A1C was done which was 16 %.  For her diabetes, pt is prescribed to take Trulicity 1.5 mg SQ once a week,Toujeo 195 units SQ each am and Novolog 85 units with meals, plus correction insulin ( 2 units/50 for BG > 150 and Jardiance 25 mg daily.  She denies missing insulin doses since her last virtual visit with me.    I have no glucose meter download data today.  Pt tells me her FBS are in the 219-250 range and her blood sugars later in the day are in the 300-400 range.  No hypoglycemia.  On ROS today, throat is feeling better.  No fevers or chills at this time.  Pt has chronic SOB and is using her CPAP. Mild cough. She is smoking.  She has numbness and tingling in both feet and hands.  Pt denies foot ulcers at this time.  Some dysuria; no hematuria.  She denies blurred vision, chest pain, abd pain or diarrhea.  Pt reports vaginal yeast infection at this time.    ROS  Please see under HPI.    Allergies  Allergies   Allergen Reactions     Penicillins Other (See Comments) and Unknown     CHILDHOOD ALLERGY  CHILDHOOD ALLERGY     Ibuprofen Hives and Rash     Ibuprofen Sodium Hives and Rash       Medications  Current Outpatient Medications   Medication Sig Dispense Refill     insulin aspart (NOVOLOG FLEXPEN) 100 UNIT/ML pen Inject  90  units with meals,plus correction.Pt uses approx 280 units/24 hrs. 250 mL 3     insulin glargine U-300 (TOUJEO SOLOSTAR) 300 UNIT/ML (1 units dial) pen INJECT 200 UNITS UNDER THE SKIN EVERY MORNING 27 mL 3     acetaminophen (TYLENOL) 325 MG tablet Take 2 tablets (650 mg) by  mouth 3 times daily as needed for mild pain or fever (total acetaminophen dose should not exceed 3000 mg per day) 180 tablet 0     albuterol (PROAIR HFA/PROVENTIL HFA/VENTOLIN HFA) 108 (90 Base) MCG/ACT inhaler Inhale 2 puffs into the lungs every 6 hours as needed for shortness of breath / dyspnea 75 g 0     bisacodyl (DULCOLAX) 10 MG suppository Place 1 suppository (10 mg) rectally daily as needed for constipation 6 suppository 0     blood glucose (ACCU-CHEK RON PLUS) test strip Use to test blood sugar 4  times daily or as directed. 360 each 3     blood glucose (NO BRAND SPECIFIED) lancets standard USE TO CHECK  blood sugar fasting each am  prelunch and predinner daily OR AS DIRECTED Call clinic to schedule MD APPT. 400 each 3     blood glucose (NO BRAND SPECIFIED) test strip Use to test blood sugars 3 times daily or as directed 400 strip 3     blood glucose monitoring (IBG STAR) meter device kit -PLEASE GIVE PATIENT A DEVICE HER INSURANCE WILL COVER- 1 kit 0     blood glucose monitoring (NO BRAND SPECIFIED) meter device kit Use to test blood sugar 3 times daily or as directed. 1 kit 1     bumetanide (BUMEX) 1 MG tablet Take 5 tablets (5 mg) by mouth 2 times daily 300 tablet 11     buPROPion (WELLBUTRIN SR) 100 MG 12 hr tablet Take 1 tablet (100 mg) by mouth 2 times daily 180 tablet 0     capsaicin (ZOSTRIX) 0.025 % external cream Apply 1 g topically 3 times daily as needed (use for neuropathy pain) 1 Tube 1     ciclopirox (LOPROX) 0.77 % cream Apply topically 2 times daily To feet and toenails. 90 g 6     ciclopirox (PENLAC) 8 % external solution Apply topically daily To toenails. 6.6 mL 0     clindamycin (CLEOCIN) 300 MG capsule        clotrimazole (LOTRIMIN) 1 % external cream Apply topically 2 times daily as needed (skin irritation) 30 g 0     colchicine (COLCYRS) 0.6 MG tablet Take 1-2 tablets (0.6-1.2 mg) by mouth daily as needed for moderate pain 180 tablet 0     cyclobenzaprine (FLEXERIL) 10 MG tablet  Take 10 mg by mouth daily       diclofenac (VOLTAREN) 1 % topical gel Apply 4 grams to knees or 2 grams to hands four times daily using enclosed dosing card. 100 g 1     dulaglutide (TRULICITY) 1.5 MG/0.5ML pen Inject 1.5 mg Subcutaneous every 7 days 6 mL 3     DULoxetine (CYMBALTA) 20 MG capsule Take 1 capsule (20 mg) by mouth 2 times daily 60 capsule 0     Efinaconazole 10 % SOLN Externally apply topically daily To toenails. 8 mL 11     empagliflozin (JARDIANCE) 25 MG TABS tablet Take 1 tablet (25 mg) by mouth daily 90 tablet 3     fluconazole (DIFLUCAN) 150 MG tablet TAKE ONE TABLET BY MOUTH ONCE FOR 1 DOSE 1 tablet 0     gabapentin (NEURONTIN) 300 MG capsule Take 2 capsules (600 mg) by mouth 2 times daily 360 capsule 1     hydrALAZINE (APRESOLINE) 25 MG tablet Take 50-75 mg by mouth Take 2 tabs (50 mg) in am, 2 tabs (50 mg) midday, and 3 tabs (75 mg) in pm daily  450 tablet 3     insulin pen needle (BD RIVER U/F) 32G X 4 MM miscellaneous Use 6 daily or as directed 600 each 3     isosorbide dinitrate (ISORDIL) 20 MG tablet Take 1 tablet (20 mg) by mouth 3 times daily 270 tablet 1     levothyroxine (SYNTHROID/LEVOTHROID) 200 MCG tablet TAKE ONE TABLET BY MOUTH DAILY MONDAY THROUGH SATURDAY, AND TWO TABLETS ON SUNDAYS 102 tablet 1     metolazone (ZAROXOLYN) 2.5 MG tablet Take 1 tablet (2.5 mg) by mouth 2 times daily Take 1/2 hour prior to torsemide 180 tablet 3     metoprolol succinate ER (TOPROL-XL) 200 MG 24 hr tablet Take 1 tablet (200 mg) by mouth daily 90 tablet 2     morphine (MS CONTIN) 15 MG 12 hr tablet Take 15 mg by mouth daily as needed       nicotine (NICODERM CQ) 14 MG/24HR 24 hr patch Place 1 patch onto the skin every 24 hours 90 patch 0     nystatin (MYCOSTATIN) 561282 UNIT/GM external cream Apply topically 2 times daily To toenails 90 g 6     order for DME Left foot 1 Units 0     order for DME Equipment being ordered:  3787-3406 $92   Plantar, fasciitis, LG, night splint 1 each 0     order for  DME Equipment being ordered: Challenger Herminia walker if available - patient needs seat, basket and brakes. 1 each 0     ORDER FOR DME Use your CPAP device as directed by your provider. Pressure change to min 13 max [id number] 1 each 99     oxyCODONE-acetaminophen (PERCOCET) 5-325 MG per tablet Take 1 tablet by mouth every 8 hours as needed for moderate to severe pain (try to limit use, no further prescriptions until seen in pain clinic) 60 tablet 0     polyethylene glycol (MIRALAX/GLYCOLAX) powder        potassium chloride ER (KLOR-CON M) 20 MEQ CR tablet Take 2 tablets (40 mEq) by mouth 4 times daily 240 tablet 6     Respiratory Therapy Supplies (NEBULIZER COMPRESSOR) KIT 1 Device 4 times daily as needed. 1 kit 3     senna (SENOKOT) 8.6 MG tablet Take 1 tablet by mouth 2 times daily 45 tablet 0     spironolactone (ALDACTONE) 50 MG tablet Take 1 tablet (50 mg) by mouth daily 90 tablet 1     tiotropium (SPIRIVA HANDIHALER) 18 MCG inhalation capsule Inhale contents of one capsule daily. 30 capsule 1     topiramate (TOPAMAX) 50 MG tablet Take 3 tablets (150 mg) by mouth daily 90 tablet 1     TRULICITY 0.75 MG/0.5ML pen INJECT THE CONTENTS OF ONE SYRINGE ONCE DAILY EVERY 7 DAYS 2 mL 0       Family History  family history includes Cerebrovascular Disease in her mother; Diabetes in her brother, mother, and sister; Heart Disease in her brother, father, mother, and sister; Hypertension in her mother; Psychotic Disorder in her brother; Thyroid Disease in her brother, maternal aunt, maternal uncle, mother, sister, sister, and sister.    Social History  Smoke: yes.  ETOH: rare.  Lives with significant other.  No children.      Past Medical History  Past Medical History:   Diagnosis Date     A-fib (H) 2011    on coumadin since 1/13     Asthma     as a kid     Chest pain 2/1/2017     Chronic anticoagulation for a-fib 2/15/2013    INR's followed by coumadin clinic at      Diabetes mellitus (H) 2012     Diastolic heart failure  2/15/2013     Dilated cardiomyopathy (H) 1/8/2013     HTN (hypertension)      Hyperthyroidism     Graves, s/p I131 1/13, now on prednisone and methimazole     Morbid obesity (H)      Pulmonary embolism (H) 1/12    hospitalized in [place], on lovenox/coumadin for a few months but stopped, hypercoag w/u neg per pt     Sleep apnea     using CPAP       Physical Exam    No exam today.    RESULTS  Creatinine   Date Value Ref Range Status   08/19/2020 1.00 0.52 - 1.04 mg/dL Final     GFR Estimate   Date Value Ref Range Status   08/19/2020 67 >60 mL/min/[1.73_m2] Final     Comment:     Non  GFR Calc  Starting 12/18/2018, serum creatinine based estimated GFR (eGFR) will be   calculated using the Chronic Kidney Disease Epidemiology Collaboration   (CKD-EPI) equation.       Hemoglobin A1C   Date Value Ref Range Status   08/19/2020 >14.0 (H) 0 - 5.6 % Final     Comment:     Normal <5.7% Prediabetes 5.7-6.4%  Diabetes 6.5% or higher - adopted from ADA   consensus guidelines.       Potassium   Date Value Ref Range Status   08/19/2020 4.4 3.4 - 5.3 mmol/L Final     ALT   Date Value Ref Range Status   08/19/2020 30 0 - 50 U/L Final     AST   Date Value Ref Range Status   08/19/2020 13 0 - 45 U/L Final     TSH   Date Value Ref Range Status   08/19/2020 17.34 (H) 0.40 - 4.00 mU/L Final     T4 Free   Date Value Ref Range Status   10/11/2018 0.95 0.76 - 1.46 ng/dL Final       Cholesterol   Date Value Ref Range Status   08/19/2020 206 (H) <200 mg/dL Final     Comment:     Desirable:       <200 mg/dl   10/11/2018 136 <200 mg/dL Final     HDL Cholesterol   Date Value Ref Range Status   08/19/2020 47 (L) >49 mg/dL Final   10/11/2018 37 (L) >49 mg/dL Final     LDL Cholesterol Calculated   Date Value Ref Range Status   08/19/2020 116 (H) <100 mg/dL Final     Comment:     Above desirable:  100-129 mg/dl  Borderline High:  130-159 mg/dL  High:             160-189 mg/dL  Very high:       >189 mg/dl     10/11/2018 70 <100 mg/dL  Final     Comment:     Desirable:       <100 mg/dl     Triglycerides   Date Value Ref Range Status   08/19/2020 216 (H) <150 mg/dL Final     Comment:     Borderline high:  150-199 mg/dl  High:             200-499 mg/dl  Very high:       >499 mg/dl     10/11/2018 143 <150 mg/dL Final     Cholesterol/HDL Ratio   Date Value Ref Range Status   07/14/2015 3.4 0.0 - 5.0 Final   01/06/2013 3.6 0.0 - 5.0 Final     A1C > 16   8/2020  A1C 10.1   7/24/2019  A1C  8.4    10/11/2018  A1C  8.9    2/7/2018  A1C  8.8     2/7/2017  A1C  9.0    11/10/2016  A1C 10.7   6/16/2016  A1C 12.1   3/10/2016    ASSESSMENT/PLAN:    1.  TYPE 2 DIABETES MELLITUS: Uncontrolled type 2 diabetes mellitus.  Pricilla is to check her blood sugar 4 times daily due to poorly controlled diabetes and fluctuation in her glycemia control.  She was instructed to increase Toujeo 200 units subcutaneous each am, take her Novolog 90 units, plus correction with meals, Trulicity 1.5 mg subcutaneous weekly and increase Jardiance 25 mg each am.  I plan to call her again in 4 days on 9/1/2020 and will make additional insulin adjustments if needed.  May need to consider using U500 insulin  She did not tolerate Metformin due to GI distress.  Pt's LDL is 70 in Oct 2018. She is not taking a statin at this time.    2. RETINOPATHY: Seen here by [facility] on 8/25/2020 and dx with mild NPDR left eye withour macular edam. No retinopathy in right eye.    3.  HTN: No vitals today.Continue current RXs.     4.  CARDIOMYOPATHY/A FIB:  Pt followed here by Cardiology staff.      5. GRAVE'S DISEASE:  Hx of Grave's disease s/p I 131 in Jan 2013.    Pt became hypothyroid following above treatment and is taking  Levothyroxine 200 mcg 1 pill Monday - Saturday and 2 pills on Sundays.  Her TSH was normal in Oct 2018.  TSH was high on 8/19/2020. Will have pt take Levothyroxine 200 mcg 1 tab Monday- Friday and 2 tabs on Saturday and Sundays.  Recheck TSH in 8-10 weeks.    6.  OBESITY:  Morbid obesity  with BMI > 70 kg/m2.  She has been seen by the [facility] staff.  She needs to lose weight prior to being considered for bariatric surgery.    7.  FOLLOW UP : with me ion 9/1/2020.  Reminded pt I have ordered labs.      Again, thank you for allowing me to participate in the care of your patient.      Sincerely,    Isela Archibald PA-C

## 2020-08-28 NOTE — TELEPHONE ENCOUNTER
FLUCONAZOLE 150MG TABS       Last Written Prescription Date:  7/29/20  Last Fill Quantity: 1,   # refills: 0  Last Virtual Visit : 8/12/20  Future Office visit:  9/1/20    Routing refill request to provider for review/approval because:  Drug not on the [facility], [facility] or [facility] refill protocol

## 2020-08-31 RX ORDER — FLUCONAZOLE 150 MG/1
150 TABLET ORAL ONCE
Qty: 1 TABLET | Refills: 0 | Status: SHIPPED | OUTPATIENT
Start: 2020-08-31 | End: 2020-08-31

## 2020-09-01 ENCOUNTER — VIRTUAL VISIT (OUTPATIENT)
Dept: ENDOCRINOLOGY | Facility: CLINIC | Age: 45
End: 2020-09-01
Payer: MEDICARE

## 2020-09-01 DIAGNOSIS — Z79.4 TYPE 2 DIABETES MELLITUS WITH HYPERGLYCEMIA, WITH LONG-TERM CURRENT USE OF INSULIN (H): ICD-10-CM

## 2020-09-01 DIAGNOSIS — E11.65 TYPE 2 DIABETES MELLITUS WITH HYPERGLYCEMIA, WITH LONG-TERM CURRENT USE OF INSULIN (H): ICD-10-CM

## 2020-09-01 DIAGNOSIS — E11.42 TYPE 2 DIABETES MELLITUS WITH DIABETIC POLYNEUROPATHY, WITHOUT LONG-TERM CURRENT USE OF INSULIN (H): ICD-10-CM

## 2020-09-01 RX ORDER — INSULIN ASPART 100 [IU]/ML
INJECTION, SOLUTION INTRAVENOUS; SUBCUTANEOUS
Qty: 250 ML | Refills: 3 | Status: SHIPPED | OUTPATIENT
Start: 2020-09-01 | End: 2020-09-08

## 2020-09-01 RX ORDER — INSULIN GLARGINE 300 U/ML
INJECTION, SOLUTION SUBCUTANEOUS
Qty: 27 ML | Refills: 3 | Status: SHIPPED | OUTPATIENT
Start: 2020-09-01 | End: 2020-09-08

## 2020-09-01 NOTE — LETTER
"9/1/2020       RE: Pricilla Brown  [address]  [facility] [address]     Dear Colleague,    Thank you for referring your patient, Pricilla Brown, to the [facility] ENDOCRINOLOGY at [facility]. Please see a copy of my visit note below.    Pricilla Brown is a 45 year old female who is being evaluated via a billable telephone visit.      The patient has been notified of following:     \"This telephone visit will be conducted via a call between you and your physician/provider. We have found that certain health care needs can be provided without the need for a physical exam.  This service lets us provide the care you need with a short phone conversation.  If a prescription is necessary we can send it directly to your pharmacy.  If lab work is needed we can place an order for that and you can then stop by our lab to have the test done at a later time.    Telephone visits are billed at different rates depending on your insurance coverage. During this emergency period, for some insurers they may be billed the same as an in-person visit.  Please reach out to your insurance provider with any questions.    If during the course of the call the physician/provider feels a telephone visit is not appropriate, you will not be charged for this service.\"    Patient has given verbal consent for Telephone visit?  Yes    What phone number would you like to be contacted at? [phone]    How would you like to obtain your AVS? Haja Desai MA    Patients Glucose Data was not obtained. Pricilla tells me that her blood sugars are going down but could not give me readings.     Due to the COVID 19 pandemic this visit was converted to a telephone visit in order to help prevent spread of infection in this patient and the general population.    Time of start: 1:00 pm  Time of end: 1:15 pm  Total duration of telephone visit: 15 minutes.    HPI  Pricilla Brown is a 45 year old female with " type 2 diabetes mellitus. Telephone visit today for diabetes follow up.  Pt's hx is also significant for morbid obesity, dilated cardiomyopathy with diastolic dysfunction, atrial fibrillation, hx of PE, HTN, Grave's disease, peripheral neuropathy, mild NPDR left eye and sleep apnea.  Pricilla was admitted in early Aug 2020 with left peritonsillar abscess s/p I & D.  During her hospitalization, an A1C was done which was 16 %.  For her diabetes, pt is prescribed to take Trulicity 1.5 mg SQ once a week,Toujeo 200 units SQ each am and Novolog 90 units with meals, plus correction insulin ( 2 units/50 for BG > 150 and Jardiance 25 mg daily.  She denies missing insulin doses since her last virtual visit with me.    I have no glucose meter download data today.  Pricilla tells me her blood sugar values are improving. She has more blood sugar values in the 200 range and less in the 300 range and no blood sugars 400 or >.  On ROS today, throat is feeling better.  No fevers or chills at this time.  Pt has chronic SOB and is using her CPAP. Mild cough. She is smoking.  She has numbness and tingling in both feet and hands.  Pt denies foot ulcers at this time.  Some dysuria; no hematuria.  She denies blurred vision, chest pain, abd pain or diarrhea.    ROS  Please see under HPI.    Allergies  Allergies   Allergen Reactions     Penicillins Other (See Comments) and Unknown     CHILDHOOD ALLERGY  CHILDHOOD ALLERGY     Ibuprofen Hives and Rash     Ibuprofen Sodium Hives and Rash       Medications  Current Outpatient Medications   Medication Sig Dispense Refill     acetaminophen (TYLENOL) 325 MG tablet Take 2 tablets (650 mg) by mouth 3 times daily as needed for mild pain or fever (total acetaminophen dose should not exceed 3000 mg per day) 180 tablet 0     albuterol (PROAIR HFA/PROVENTIL HFA/VENTOLIN HFA) 108 (90 Base) MCG/ACT inhaler Inhale 2 puffs into the lungs every 6 hours as needed for shortness of breath / dyspnea 75 g 0      bisacodyl (DULCOLAX) 10 MG suppository Place 1 suppository (10 mg) rectally daily as needed for constipation 6 suppository 0     blood glucose (ACCU-CHEK RON PLUS) test strip Use to test blood sugar 4  times daily or as directed. 360 each 3     blood glucose (NO BRAND SPECIFIED) lancets standard USE TO CHECK  blood sugar fasting each am  prelunch and predinner daily OR AS DIRECTED Call clinic to schedule MD APPT. 400 each 3     blood glucose (NO BRAND SPECIFIED) test strip Use to test blood sugars 3 times daily or as directed 400 strip 3     blood glucose monitoring (IBG STAR) meter device kit -PLEASE GIVE PATIENT A DEVICE HER INSURANCE WILL COVER- 1 kit 0     blood glucose monitoring (NO BRAND SPECIFIED) meter device kit Use to test blood sugar 3 times daily or as directed. 1 kit 1     bumetanide (BUMEX) 1 MG tablet Take 5 tablets (5 mg) by mouth 2 times daily 300 tablet 11     buPROPion (WELLBUTRIN SR) 100 MG 12 hr tablet Take 1 tablet (100 mg) by mouth 2 times daily 180 tablet 0     capsaicin (ZOSTRIX) 0.025 % external cream Apply 1 g topically 3 times daily as needed (use for neuropathy pain) 1 Tube 1     ciclopirox (LOPROX) 0.77 % cream Apply topically 2 times daily To feet and toenails. 90 g 6     ciclopirox (PENLAC) 8 % external solution Apply topically daily To toenails. 6.6 mL 0     clindamycin (CLEOCIN) 300 MG capsule        clotrimazole (LOTRIMIN) 1 % external cream Apply topically 2 times daily as needed (skin irritation) 30 g 0     colchicine (COLCYRS) 0.6 MG tablet Take 1-2 tablets (0.6-1.2 mg) by mouth daily as needed for moderate pain 180 tablet 0     cyclobenzaprine (FLEXERIL) 10 MG tablet Take 10 mg by mouth daily       diclofenac (VOLTAREN) 1 % topical gel Apply 4 grams to knees or 2 grams to hands four times daily using enclosed dosing card. 100 g 1     dulaglutide (TRULICITY) 1.5 MG/0.5ML pen Inject 1.5 mg Subcutaneous every 7 days 6 mL 3     DULoxetine (CYMBALTA) 20 MG capsule Take 1 capsule  (20 mg) by mouth 2 times daily 60 capsule 0     Efinaconazole 10 % SOLN Externally apply topically daily To toenails. 8 mL 11     empagliflozin (JARDIANCE) 25 MG TABS tablet Take 1 tablet (25 mg) by mouth daily 90 tablet 3     gabapentin (NEURONTIN) 300 MG capsule Take 2 capsules (600 mg) by mouth 2 times daily 360 capsule 1     hydrALAZINE (APRESOLINE) 25 MG tablet Take 50-75 mg by mouth Take 2 tabs (50 mg) in am, 2 tabs (50 mg) midday, and 3 tabs (75 mg) in pm daily  450 tablet 3     insulin aspart (NOVOLOG FLEXPEN) 100 UNIT/ML pen Inject  95  units with meals,plus correction.Pt uses approx 280 units/24 hrs. 250 mL 3     insulin glargine U-300 (TOUJEO SOLOSTAR) 300 UNIT/ML (1 units dial) pen Inject 210 units subcutaneous each am. 27 mL 3     insulin pen needle (BD RIVER U/F) 32G X 4 MM miscellaneous Use 6 daily or as directed 600 each 3     isosorbide dinitrate (ISORDIL) 20 MG tablet Take 1 tablet (20 mg) by mouth 3 times daily 270 tablet 1     levothyroxine (SYNTHROID/LEVOTHROID) 200 MCG tablet 1 tab Monday thru Friday and 2 tabs on Saturday and Sunday. Please have labs drawn first week of Nov 2020. 116 tablet 1     metolazone (ZAROXOLYN) 2.5 MG tablet Take 1 tablet (2.5 mg) by mouth 2 times daily Take 1/2 hour prior to torsemide 180 tablet 3     metoprolol succinate ER (TOPROL-XL) 200 MG 24 hr tablet Take 1 tablet (200 mg) by mouth daily 90 tablet 2     morphine (MS CONTIN) 15 MG 12 hr tablet Take 15 mg by mouth daily as needed       nicotine (NICODERM CQ) 14 MG/24HR 24 hr patch Place 1 patch onto the skin every 24 hours 90 patch 0     nystatin (MYCOSTATIN) 518296 UNIT/GM external cream Apply topically 2 times daily To toenails 90 g 6     order for DME Left foot 1 Units 0     order for DME Equipment being ordered: [id number] [id number] $92   Plantar, fasciitis, LG, night splint 1 each 0     order for DME Equipment being ordered: Challenger Wide walker if available - patient needs seat, basket and brakes. 1 each 0      ORDER FOR DME Use your CPAP device as directed by your provider. Pressure change to min 13 max [id number] 1 each 99     oxyCODONE-acetaminophen (PERCOCET) 5-325 MG per tablet Take 1 tablet by mouth every 8 hours as needed for moderate to severe pain (try to limit use, no further prescriptions until seen in pain clinic) 60 tablet 0     polyethylene glycol (MIRALAX/GLYCOLAX) powder        potassium chloride ER (KLOR-CON M) 20 MEQ CR tablet Take 2 tablets (40 mEq) by mouth 4 times daily 240 tablet 6     Respiratory Therapy Supplies (NEBULIZER COMPRESSOR) KIT 1 Device 4 times daily as needed. 1 kit 3     senna (SENOKOT) 8.6 MG tablet Take 1 tablet by mouth 2 times daily 45 tablet 0     spironolactone (ALDACTONE) 50 MG tablet Take 1 tablet (50 mg) by mouth daily 90 tablet 1     tiotropium (SPIRIVA HANDIHALER) 18 MCG inhalation capsule Inhale contents of one capsule daily. 30 capsule 1     topiramate (TOPAMAX) 50 MG tablet Take 3 tablets (150 mg) by mouth daily 90 tablet 1     TRULICITY 0.75 MG/0.5ML pen INJECT THE CONTENTS OF ONE SYRINGE ONCE DAILY EVERY 7 DAYS 2 mL 0     Family History  family history includes Cerebrovascular Disease in her mother; Diabetes in her brother, mother, and sister; Heart Disease in her brother, father, mother, and sister; Hypertension in her mother; Psychotic Disorder in her brother; Thyroid Disease in her brother, maternal aunt, maternal uncle, mother, sister, sister, and sister.    Social History  Smoke: yes.  ETOH: rare.  Lives with significant other.  No children.      Past Medical History  Past Medical History:   Diagnosis Date     A-fib (H) 2011    on coumadin since 1/13     Asthma     as a kid     Chest pain 2/1/2017     Chronic anticoagulation for a-fib 2/15/2013    INR's followed by coumadin clinic at [facility]     Diabetes mellitus (H) 2012     Diastolic heart failure 2/15/2013     Dilated cardiomyopathy (H) 1/8/2013     HTN (hypertension)      Hyperthyroidism     Graves, s/p I131 1/13, now on  prednisone and methimazole     Morbid obesity (H)      Pulmonary embolism (H) 1/12    hospitalized in [place], on lovenox/coumadin for a few months but stopped, hypercoag w/u neg per pt     Sleep apnea     using CPAP       Physical Exam    No exam today.    RESULTS  Creatinine   Date Value Ref Range Status   08/19/2020 1.00 0.52 - 1.04 mg/dL Final     GFR Estimate   Date Value Ref Range Status   08/19/2020 67 >60 mL/min/[1.73_m2] Final     Comment:     Non  GFR Calc  Starting 12/18/2018, serum creatinine based estimated GFR (eGFR) will be   calculated using the Chronic Kidney Disease Epidemiology Collaboration   (CKD-EPI) equation.       Hemoglobin A1C   Date Value Ref Range Status   08/19/2020 >14.0 (H) 0 - 5.6 % Final     Comment:     Normal <5.7% Prediabetes 5.7-6.4%  Diabetes 6.5% or higher - adopted from ADA   consensus guidelines.       Potassium   Date Value Ref Range Status   08/19/2020 4.4 3.4 - 5.3 mmol/L Final     ALT   Date Value Ref Range Status   08/19/2020 30 0 - 50 U/L Final     AST   Date Value Ref Range Status   08/19/2020 13 0 - 45 U/L Final     TSH   Date Value Ref Range Status   08/19/2020 17.34 (H) 0.40 - 4.00 mU/L Final     T4 Free   Date Value Ref Range Status   10/11/2018 0.95 0.76 - 1.46 ng/dL Final       Cholesterol   Date Value Ref Range Status   08/19/2020 206 (H) <200 mg/dL Final     Comment:     Desirable:       <200 mg/dl   10/11/2018 136 <200 mg/dL Final     HDL Cholesterol   Date Value Ref Range Status   08/19/2020 47 (L) >49 mg/dL Final   10/11/2018 37 (L) >49 mg/dL Final     LDL Cholesterol Calculated   Date Value Ref Range Status   08/19/2020 116 (H) <100 mg/dL Final     Comment:     Above desirable:  100-129 mg/dl  Borderline High:  130-159 mg/dL  High:             160-189 mg/dL  Very high:       >189 mg/dl     10/11/2018 70 <100 mg/dL Final     Comment:     Desirable:       <100 mg/dl     Triglycerides   Date Value Ref Range Status   08/19/2020 216 (H) <150 mg/dL  Final     Comment:     Borderline high:  150-199 mg/dl  High:             200-499 mg/dl  Very high:       >499 mg/dl     10/11/2018 143 <150 mg/dL Final     Cholesterol/HDL Ratio   Date Value Ref Range Status   07/14/2015 3.4 0.0 - 5.0 Final   01/06/2013 3.6 0.0 - 5.0 Final     A1C > 16   8/2020  A1C 10.1   7/24/2019  A1C  8.4    10/11/2018  A1C  8.9    2/7/2018  A1C  8.8     2/7/2017  A1C  9.0    11/10/2016  A1C 10.7   6/16/2016  A1C 12.1   3/10/2016    ASSESSMENT/PLAN:    1.  TYPE 2 DIABETES MELLITUS: Uncontrolled type 2 diabetes mellitus.  Pricilla is to check her blood sugar 4 times daily due to poorly controlled diabetes and fluctuation in her glycemia control.  Her blood sugar values are improving.  She was instructed to increase Toujeo 210 units subcutaneous each am, take her Novolog 905units, plus correction with meals, Trulicity 1.5 mg subcutaneous weekly and Jardiance 25 mg each am.  I plan to call her again next week and will make additional insulin adjustments if needed.  May need to consider using U500 insulin  She did not tolerate Metformin due to GI distress.  Pt's LDL is 70 in Oct 2018. She is not taking a statin at this time.    2. RETINOPATHY: Seen here by [facility] on 8/25/2020 and dx with mild NPDR left eye withour macular edam. No retinopathy in right eye.    3.  HTN: No vitals today.Continue current RXs.     4.  CARDIOMYOPATHY/A FIB:  Pt followed here by Cardiology staff.      5. GRAVE'S DISEASE:  Hx of Grave's disease s/p I 131 in Jan 2013.    Pt became hypothyroid following above treatment and is taking  Levothyroxine 200 mcg 1 pill Monday - Saturday and 2 pills on Sundays.  Her TSH was normal in Oct 2018.  TSH was high on 8/19/2020. Will have pt take Levothyroxine 200 mcg 1 tab Monday- Friday and 2 tabs on Saturday and Sundays.  Recheck TSH in 8-10 weeks.    6.  OBESITY:  Morbid obesity with BMI > 70 kg/m2.  [facility].  She needs to lose weight prior to being  considered for bariatric surgery.    7.  FOLLOW UP : with me in 1 week.  Reminded pt I have ordered labs.

## 2020-09-02 ENCOUNTER — TELEPHONE (OUTPATIENT)
Dept: ENDOCRINOLOGY | Facility: CLINIC | Age: 45
End: 2020-09-02

## 2020-09-02 NOTE — TELEPHONE ENCOUNTER
Phone messages left and my chart sent with no call back as received. Sarina Haji RN on 9/2/2020 at 1:36 PM

## 2020-09-07 NOTE — PROGRESS NOTES
"Pricilla Brown is a 45 year old female who is being evaluated via a billable telephone visit.      The patient has been notified of following:     \"This telephone visit will be conducted via a call between you and your physician/provider. We have found that certain health care needs can be provided without the need for a physical exam.  This service lets us provide the care you need with a short phone conversation.  If a prescription is necessary we can send it directly to your pharmacy.  If lab work is needed we can place an order for that and you can then stop by our lab to have the test done at a later time.    Telephone visits are billed at different rates depending on your insurance coverage. During this emergency period, for some insurers they may be billed the same as an in-person visit.  Please reach out to your insurance provider with any questions.    If during the course of the call the physician/provider feels a telephone visit is not appropriate, you will not be charged for this service.\"    Patient has given verbal consent for Telephone visit?  Yes    What phone number would you like to be contacted at? [phone]    How would you like to obtain your AVS? Haja Desai MA    Patients Glucose Data was Patient did not answer after multiple attempts. Unable to obtain data       Due to the COVID 19 pandemic this visit was converted to a telephone visit in order to help prevent spread of infection in this patient and the general population.    Time of start: 11:21 am  Time of end: 11:33 am  Total duration of telephone visit: 12 minutes.    HPI  Pricilla Brown is a 45 year old female with type 2 diabetes mellitus. Telephone visit today for diabetes follow up.  Pt's hx is also significant for morbid obesity, dilated cardiomyopathy with diastolic dysfunction, atrial fibrillation, hx of PE, HTN, Grave's disease, peripheral neuropathy, mild NPDR left eye and sleep apnea.  Pricilla was admitted in " early Aug 2020 with left peritonsillar abscess s/p I & D.  During her hospitalization, an A1C was done which was 16 %.  For her diabetes, pt is prescribed to take Trulicity 1.5 mg SQ once a week,Toujeo 210 units SQ each am and Novolog 95 units with meals, plus correction insulin ( 2 units/50 for BG > 150 and Jardiance 25 mg daily.  She denies missing insulin doses since her last virtual visit with me.    I have no glucose meter download data today.  Pricilla tells me her blood sugar values have been in the mid 200 to 400 range.  No hypoglycemia.  She denies missing insulin doses.  She denies any obvious infection or change in diet or activity.  On ROS today, throat is feeling better.  No fevers or chills at this time.  Pt has chronic SOB and is using her CPAP. Mild cough. She is smoking.    She has numbness and tingling in both feet and hands. Pt denies foot ulcers at this time.  Some dysuria; no hematuria.  She denies blurred vision, chest pain, abd pain or diarrhea.    ROS  Please see under HPI.    Allergies  Allergies   Allergen Reactions     Penicillins Other (See Comments) and Unknown     CHILDHOOD ALLERGY  CHILDHOOD ALLERGY     Ibuprofen Hives and Rash     Ibuprofen Sodium Hives and Rash       Medications  Current Outpatient Medications   Medication Sig Dispense Refill     acetaminophen (TYLENOL) 325 MG tablet Take 2 tablets (650 mg) by mouth 3 times daily as needed for mild pain or fever (total acetaminophen dose should not exceed 3000 mg per day) 180 tablet 0     albuterol (PROAIR HFA/PROVENTIL HFA/VENTOLIN HFA) 108 (90 Base) MCG/ACT inhaler Inhale 2 puffs into the lungs every 6 hours as needed for shortness of breath / dyspnea 75 g 0     bisacodyl (DULCOLAX) 10 MG suppository Place 1 suppository (10 mg) rectally daily as needed for constipation 6 suppository 0     blood glucose (ACCU-CHEK RON PLUS) test strip Use to test blood sugar 4  times daily or as directed. 360 each 3     blood glucose (NO BRAND  SPECIFIED) lancets standard USE TO CHECK  blood sugar fasting each am  prelunch and predinner daily OR AS DIRECTED Call clinic to schedule MD APPT. 400 each 3     blood glucose (NO BRAND SPECIFIED) test strip Use to test blood sugars 3 times daily or as directed 400 strip 3     blood glucose monitoring (IBG STAR) meter device kit -PLEASE GIVE PATIENT A DEVICE HER INSURANCE WILL COVER- 1 kit 0     blood glucose monitoring (NO BRAND SPECIFIED) meter device kit Use to test blood sugar 3 times daily or as directed. 1 kit 1     bumetanide (BUMEX) 1 MG tablet Take 5 tablets (5 mg) by mouth 2 times daily 300 tablet 11     buPROPion (WELLBUTRIN SR) 100 MG 12 hr tablet Take 1 tablet (100 mg) by mouth 2 times daily 180 tablet 0     capsaicin (ZOSTRIX) 0.025 % external cream Apply 1 g topically 3 times daily as needed (use for neuropathy pain) 1 Tube 1     ciclopirox (LOPROX) 0.77 % cream Apply topically 2 times daily To feet and toenails. 90 g 6     ciclopirox (PENLAC) 8 % external solution Apply topically daily To toenails. 6.6 mL 0     clindamycin (CLEOCIN) 300 MG capsule        clotrimazole (LOTRIMIN) 1 % external cream Apply topically 2 times daily as needed (skin irritation) 30 g 0     colchicine (COLCYRS) 0.6 MG tablet Take 1-2 tablets (0.6-1.2 mg) by mouth daily as needed for moderate pain 180 tablet 0     cyclobenzaprine (FLEXERIL) 10 MG tablet Take 10 mg by mouth daily       diclofenac (VOLTAREN) 1 % topical gel Apply 4 grams to knees or 2 grams to hands four times daily using enclosed dosing card. 100 g 1     dulaglutide (TRULICITY) 1.5 MG/0.5ML pen Inject 1.5 mg Subcutaneous every 7 days 6 mL 3     DULoxetine (CYMBALTA) 20 MG capsule Take 1 capsule (20 mg) by mouth 2 times daily 60 capsule 0     Efinaconazole 10 % SOLN Externally apply topically daily To toenails. 8 mL 11     empagliflozin (JARDIANCE) 25 MG TABS tablet Take 1 tablet (25 mg) by mouth daily 90 tablet 3     gabapentin (NEURONTIN) 300 MG capsule Take 2  capsules (600 mg) by mouth 2 times daily 360 capsule 1     hydrALAZINE (APRESOLINE) 25 MG tablet Take 50-75 mg by mouth Take 2 tabs (50 mg) in am, 2 tabs (50 mg) midday, and 3 tabs (75 mg) in pm daily  450 tablet 3     insulin aspart (NOVOLOG FLEXPEN) 100 UNIT/ML pen Inject  95  units with meals,plus correction.Pt uses approx 280 units/24 hrs. 250 mL 3     insulin glargine U-300 (TOUJEO SOLOSTAR) 300 UNIT/ML (1 units dial) pen Inject 210 units subcutaneous each am. 27 mL 3     insulin pen needle (BD RIVER U/F) 32G X 4 MM miscellaneous Use 6 daily or as directed 600 each 3     isosorbide dinitrate (ISORDIL) 20 MG tablet Take 1 tablet (20 mg) by mouth 3 times daily 270 tablet 1     levothyroxine (SYNTHROID/LEVOTHROID) 200 MCG tablet 1 tab Monday thru Friday and 2 tabs on Saturday and Sunday. Please have labs drawn first week of Nov 2020. 116 tablet 1     metolazone (ZAROXOLYN) 2.5 MG tablet Take 1 tablet (2.5 mg) by mouth 2 times daily Take 1/2 hour prior to torsemide 180 tablet 3     metoprolol succinate ER (TOPROL-XL) 200 MG 24 hr tablet Take 1 tablet (200 mg) by mouth daily 90 tablet 2     morphine (MS CONTIN) 15 MG 12 hr tablet Take 15 mg by mouth daily as needed       nicotine (NICODERM CQ) 14 MG/24HR 24 hr patch Place 1 patch onto the skin every 24 hours 90 patch 0     nystatin (MYCOSTATIN) 440747 UNIT/GM external cream Apply topically 2 times daily To toenails 90 g 6     order for DME Left foot 1 Units 0     order for DME Equipment being ordered: [id number] $92   Plantar, fasciitis, LG, night splint 1 each 0     order for DME Equipment being ordered: Challenger Wide walker if available - patient needs seat, basket and brakes. 1 each 0     ORDER FOR DME Use your CPAP device as directed by your provider. Pressure change to min 13 max [id number] 1 each 99     oxyCODONE-acetaminophen (PERCOCET) 5-325 MG per tablet Take 1 tablet by mouth every 8 hours as needed for moderate to severe pain (try to limit use, no  further prescriptions until seen in pain clinic) 60 tablet 0     polyethylene glycol (MIRALAX/GLYCOLAX) powder        potassium chloride ER (KLOR-CON M) 20 MEQ CR tablet Take 2 tablets (40 mEq) by mouth 4 times daily 240 tablet 6     Respiratory Therapy Supplies (NEBULIZER COMPRESSOR) KIT 1 Device 4 times daily as needed. 1 kit 3     senna (SENOKOT) 8.6 MG tablet Take 1 tablet by mouth 2 times daily 45 tablet 0     spironolactone (ALDACTONE) 50 MG tablet Take 1 tablet (50 mg) by mouth daily 90 tablet 1     tiotropium (SPIRIVA HANDIHALER) 18 MCG inhalation capsule Inhale contents of one capsule daily. 30 capsule 1     topiramate (TOPAMAX) 50 MG tablet Take 3 tablets (150 mg) by mouth daily 90 tablet 1     TRULICITY 0.75 MG/0.5ML pen INJECT THE CONTENTS OF ONE SYRINGE ONCE DAILY EVERY 7 DAYS 2 mL 0     Family History  family history includes Cerebrovascular Disease in her mother; Diabetes in her brother, mother, and sister; Heart Disease in her brother, father, mother, and sister; Hypertension in her mother; Psychotic Disorder in her brother; Thyroid Disease in her brother, maternal aunt, maternal uncle, mother, sister, sister, and sister.    Social History  Smoke: yes.  ETOH: rare.  Lives with significant other.  No children.      Past Medical History  Past Medical History:   Diagnosis Date     A-fib (H) 2011    on coumadin since 1/13     Asthma     as a kid     Chest pain 2/1/2017     Chronic anticoagulation for a-fib 2/15/2013    INR's followed by coumadin clinic at      Diabetes mellitus (H) 2012     Diastolic heart failure 2/15/2013     Dilated cardiomyopathy (H) 1/8/2013     HTN (hypertension)      Hyperthyroidism     Graves, s/p I131 1/13, now on prednisone and methimazole     Morbid obesity (H)      Pulmonary embolism (H) 1/12    hospitalized in [place], on lovenox/coumadin for a few months but stopped, hypercoag w/u neg per pt     Sleep apnea     using CPAP       Physical Exam    No exam  today.    RESULTS  Creatinine   Date Value Ref Range Status   08/19/2020 1.00 0.52 - 1.04 mg/dL Final     GFR Estimate   Date Value Ref Range Status   08/19/2020 67 >60 mL/min/[1.73_m2] Final     Comment:     Non  GFR Calc  Starting 12/18/2018, serum creatinine based estimated GFR (eGFR) will be   calculated using the Chronic Kidney Disease Epidemiology Collaboration   (CKD-EPI) equation.       Hemoglobin A1C   Date Value Ref Range Status   08/19/2020 >14.0 (H) 0 - 5.6 % Final     Comment:     Normal <5.7% Prediabetes 5.7-6.4%  Diabetes 6.5% or higher - adopted from ADA   consensus guidelines.       Potassium   Date Value Ref Range Status   08/19/2020 4.4 3.4 - 5.3 mmol/L Final     ALT   Date Value Ref Range Status   08/19/2020 30 0 - 50 U/L Final     AST   Date Value Ref Range Status   08/19/2020 13 0 - 45 U/L Final     TSH   Date Value Ref Range Status   08/19/2020 17.34 (H) 0.40 - 4.00 mU/L Final     T4 Free   Date Value Ref Range Status   10/11/2018 0.95 0.76 - 1.46 ng/dL Final       Cholesterol   Date Value Ref Range Status   08/19/2020 206 (H) <200 mg/dL Final     Comment:     Desirable:       <200 mg/dl   10/11/2018 136 <200 mg/dL Final     HDL Cholesterol   Date Value Ref Range Status   08/19/2020 47 (L) >49 mg/dL Final   10/11/2018 37 (L) >49 mg/dL Final     LDL Cholesterol Calculated   Date Value Ref Range Status   08/19/2020 116 (H) <100 mg/dL Final     Comment:     Above desirable:  100-129 mg/dl  Borderline High:  130-159 mg/dL  High:             160-189 mg/dL  Very high:       >189 mg/dl     10/11/2018 70 <100 mg/dL Final     Comment:     Desirable:       <100 mg/dl     Triglycerides   Date Value Ref Range Status   08/19/2020 216 (H) <150 mg/dL Final     Comment:     Borderline high:  150-199 mg/dl  High:             200-499 mg/dl  Very high:       >499 mg/dl     10/11/2018 143 <150 mg/dL Final     Cholesterol/HDL Ratio   Date Value Ref Range Status   07/14/2015 3.4 0.0 - 5.0 Final    01/06/2013 3.6 0.0 - 5.0 Final       A1C  > 14  8/19/2020  A1C > 16   8/2020  A1C 10.1   7/24/2019  A1C  8.4    10/11/2018  A1C  8.9    2/7/2018  A1C  8.8     2/7/2017  A1C  9.0    11/10/2016  A1C 10.7   6/16/2016  A1C 12.1   3/10/2016    ASSESSMENT/PLAN:    1.  TYPE 2 DIABETES MELLITUS: Uncontrolled type 2 diabetes mellitus.  Pricilla is to check her blood sugar 4 times daily due to poorly controlled diabetes and fluctuation in her glycemia control.  She was instructed to increase Toujeo 225 units subcutaneous each am, take her Novolog 100 units, plus correction with meals, Trulicity 1.5 mg subcutaneous weekly and Jardiance 25 mg each am.  I plan to call her again next week and will make additional insulin adjustments if needed.  If her blood sugar values do no improve, will plan to use U-500 insulin TID.  She did not tolerate Metformin due to GI distress.  Pt's LDL is 70 in Oct 2018. She is not taking a statin at this time.    2. RETINOPATHY: Seen here by [facility] on 8/25/2020 and dx with mild NPDR left eye withour macular edam. No retinopathy in right eye.    3.  HTN: No vitals today.Continue current RXs.     4.  CARDIOMYOPATHY/A FIB:  Pt followed here by Cardiology staff.      5. GRAVE'S DISEASE:  Hx of Grave's disease s/p I 131 in Jan 2013.    Pt became hypothyroid following above treatment and is taking  Levothyroxine 200 mcg 1 pill Monday - Saturday and 2 pills on Sundays.  Her TSH was normal in Oct 2018.  TSH was high on 8/19/2020. Will have pt take Levothyroxine 200 mcg 1 tab Monday- Friday and 2 tabs on Saturday and Sundays.  Recheck TSH in 8-10 weeks.    6.  OBESITY:  Morbid obesity with BMI > 70 kg/m2.  [facility].  She needs to lose weight prior to being considered for bariatric surgery.    7.  FOLLOW UP : with me in 1 week.  Reminded pt I have ordered labs.

## 2020-09-08 ENCOUNTER — VIRTUAL VISIT (OUTPATIENT)
Dept: ENDOCRINOLOGY | Facility: CLINIC | Age: 45
End: 2020-09-08
Payer: MEDICARE

## 2020-09-08 DIAGNOSIS — Z79.4 TYPE 2 DIABETES MELLITUS WITH HYPERGLYCEMIA, WITH LONG-TERM CURRENT USE OF INSULIN (H): ICD-10-CM

## 2020-09-08 DIAGNOSIS — E11.65 TYPE 2 DIABETES MELLITUS WITH HYPERGLYCEMIA, WITH LONG-TERM CURRENT USE OF INSULIN (H): Primary | ICD-10-CM

## 2020-09-08 DIAGNOSIS — Z79.4 TYPE 2 DIABETES MELLITUS WITH HYPERGLYCEMIA, WITH LONG-TERM CURRENT USE OF INSULIN (H): Primary | ICD-10-CM

## 2020-09-08 DIAGNOSIS — E11.65 TYPE 2 DIABETES MELLITUS WITH HYPERGLYCEMIA, WITH LONG-TERM CURRENT USE OF INSULIN (H): ICD-10-CM

## 2020-09-08 DIAGNOSIS — E11.42 TYPE 2 DIABETES MELLITUS WITH DIABETIC POLYNEUROPATHY, WITHOUT LONG-TERM CURRENT USE OF INSULIN (H): ICD-10-CM

## 2020-09-08 RX ORDER — INSULIN GLARGINE 300 U/ML
INJECTION, SOLUTION SUBCUTANEOUS
Qty: 27 ML | Refills: 3 | COMMUNITY
Start: 2020-09-08 | End: 2020-09-16

## 2020-09-08 RX ORDER — INSULIN ASPART 100 [IU]/ML
INJECTION, SOLUTION INTRAVENOUS; SUBCUTANEOUS
Qty: 250 ML | Refills: 3 | COMMUNITY
Start: 2020-09-08 | End: 2020-10-05

## 2020-09-08 NOTE — LETTER
"9/8/2020       RE: Pricilla Brown  [address]  [facility] [address]     Dear Colleague,    Thank you for referring your patient, Pricilla Brown, to the [facility] ENDOCR[facility]OLOGY at [facility]. Please see a copy of my visit note below.    Pricilla Brown is a 45 year old female who is being evaluated via a billable telephone visit.      The patient has been notified of following:     \"This telephone visit will be conducted via a call between you and your physician/provider. We have found that certain health care needs can be provided without the need for a physical exam.  This service lets us provide the care you need with a short phone conversation.  If a prescription is necessary we can send it directly to your pharmacy.  If lab work is needed we can place an order for that and you can then stop by our lab to have the test done at a later time.    Telephone visits are billed at different rates depending on your insurance coverage. During this emergency period, for some insurers they may be billed the same as an in-person visit.  Please reach out to your insurance provider with any questions.    If during the course of the call the physician/provider feels a telephone visit is not appropriate, you will not be charged for this service.\"    Patient has given verbal consent for Telephone visit?  Yes    What phone number would you like to be contacted at? [phone]    How would you like to obtain your AVS? Haja Desai MA    Patients Glucose Data was Patient did not answer after multiple attempts. Unable to obtain data       Due to the COVID 19 pandemic this visit was converted to a telephone visit in order to help prevent spread of infection in this patient and the general population.    Time of start: 11:21 am  Time of end: 11:33 am  Total duration of telephone visit: 12 minutes.    HPI  Pricilla Brown is a 45 year old female with type 2 diabetes mellitus. " Telephone visit today for diabetes follow up.  Pt's hx is also significant for morbid obesity, dilated cardiomyopathy with diastolic dysfunction, atrial fibrillation, hx of PE, HTN, Grave's disease, peripheral neuropathy, mild NPDR left eye and sleep apnea.  Pricilla was admitted in early Aug 2020 with left peritonsillar abscess s/p I & D.  During her hospitalization, an A1C was done which was 16 %.  For her diabetes, pt is prescribed to take Trulicity 1.5 mg SQ once a week,Toujeo 210 units SQ each am and Novolog 95 units with meals, plus correction insulin ( 2 units/50 for BG > 150 and Jardiance 25 mg daily.  She denies missing insulin doses since her last virtual visit with me.    I have no glucose meter download data today.  Pricilla tells me her blood sugar values have been in the mid 200 to 400 range.  No hypoglycemia.  She denies missing insulin doses.  She denies any obvious infection or change in diet or activity.  On ROS today, throat is feeling better.  No fevers or chills at this time.  Pt has chronic SOB and is using her CPAP. Mild cough. She is smoking.    She has numbness and tingling in both feet and hands. Pt denies foot ulcers at this time.  Some dysuria; no hematuria.  She denies blurred vision, chest pain, abd pain or diarrhea.    ROS  Please see under HPI.    Allergies  Allergies   Allergen Reactions     Penicillins Other (See Comments) and Unknown     CHILDHOOD ALLERGY  CHILDHOOD ALLERGY     Ibuprofen Hives and Rash     Ibuprofen Sodium Hives and Rash       Medications  Current Outpatient Medications   Medication Sig Dispense Refill     acetaminophen (TYLENOL) 325 MG tablet Take 2 tablets (650 mg) by mouth 3 times daily as needed for mild pain or fever (total acetaminophen dose should not exceed 3000 mg per day) 180 tablet 0     albuterol (PROAIR HFA/PROVENTIL HFA/VENTOLIN HFA) 108 (90 Base) MCG/ACT inhaler Inhale 2 puffs into the lungs every 6 hours as needed for shortness of breath / dyspnea 75 g 0      bisacodyl (DULCOLAX) 10 MG suppository Place 1 suppository (10 mg) rectally daily as needed for constipation 6 suppository 0     blood glucose (ACCU-CHEK RON PLUS) test strip Use to test blood sugar 4  times daily or as directed. 360 each 3     blood glucose (NO BRAND SPECIFIED) lancets standard USE TO CHECK  blood sugar fasting each am  prelunch and predinner daily OR AS DIRECTED Call clinic to schedule MD APPT. 400 each 3     blood glucose (NO BRAND SPECIFIED) test strip Use to test blood sugars 3 times daily or as directed 400 strip 3     blood glucose monitoring (IBG STAR) meter device kit -PLEASE GIVE PATIENT A DEVICE HER INSURANCE WILL COVER- 1 kit 0     blood glucose monitoring (NO BRAND SPECIFIED) meter device kit Use to test blood sugar 3 times daily or as directed. 1 kit 1     bumetanide (BUMEX) 1 MG tablet Take 5 tablets (5 mg) by mouth 2 times daily 300 tablet 11     buPROPion (WELLBUTRIN SR) 100 MG 12 hr tablet Take 1 tablet (100 mg) by mouth 2 times daily 180 tablet 0     capsaicin (ZOSTRIX) 0.025 % external cream Apply 1 g topically 3 times daily as needed (use for neuropathy pain) 1 Tube 1     ciclopirox (LOPROX) 0.77 % cream Apply topically 2 times daily To feet and toenails. 90 g 6     ciclopirox (PENLAC) 8 % external solution Apply topically daily To toenails. 6.6 mL 0     clindamycin (CLEOCIN) 300 MG capsule        clotrimazole (LOTRIMIN) 1 % external cream Apply topically 2 times daily as needed (skin irritation) 30 g 0     colchicine (COLCYRS) 0.6 MG tablet Take 1-2 tablets (0.6-1.2 mg) by mouth daily as needed for moderate pain 180 tablet 0     cyclobenzaprine (FLEXERIL) 10 MG tablet Take 10 mg by mouth daily       diclofenac (VOLTAREN) 1 % topical gel Apply 4 grams to knees or 2 grams to hands four times daily using enclosed dosing card. 100 g 1     dulaglutide (TRULICITY) 1.5 MG/0.5ML pen Inject 1.5 mg Subcutaneous every 7 days 6 mL 3     DULoxetine (CYMBALTA) 20 MG capsule Take 1  capsule (20 mg) by mouth 2 times daily 60 capsule 0     Efinaconazole 10 % SOLN Externally apply topically daily To toenails. 8 mL 11     empagliflozin (JARDIANCE) 25 MG TABS tablet Take 1 tablet (25 mg) by mouth daily 90 tablet 3     gabapentin (NEURONTIN) 300 MG capsule Take 2 capsules (600 mg) by mouth 2 times daily 360 capsule 1     hydrALAZINE (APRESOLINE) 25 MG tablet Take 50-75 mg by mouth Take 2 tabs (50 mg) in am, 2 tabs (50 mg) midday, and 3 tabs (75 mg) in pm daily  450 tablet 3     insulin aspart (NOVOLOG FLEXPEN) 100 UNIT/ML pen Inject  95  units with meals,plus correction.Pt uses approx 280 units/24 hrs. 250 mL 3     insulin glargine U-300 (TOUJEO SOLOSTAR) 300 UNIT/ML (1 units dial) pen Inject 210 units subcutaneous each am. 27 mL 3     insulin pen needle (BD RIVER U/F) 32G X 4 MM miscellaneous Use 6 daily or as directed 600 each 3     isosorbide dinitrate (ISORDIL) 20 MG tablet Take 1 tablet (20 mg) by mouth 3 times daily 270 tablet 1     levothyroxine (SYNTHROID/LEVOTHROID) 200 MCG tablet 1 tab Monday thru Friday and 2 tabs on Saturday and Sunday. Please have labs drawn first week of Nov 2020. 116 tablet 1     metolazone (ZAROXOLYN) 2.5 MG tablet Take 1 tablet (2.5 mg) by mouth 2 times daily Take 1/2 hour prior to torsemide 180 tablet 3     metoprolol succinate ER (TOPROL-XL) 200 MG 24 hr tablet Take 1 tablet (200 mg) by mouth daily 90 tablet 2     morphine (MS CONTIN) 15 MG 12 hr tablet Take 15 mg by mouth daily as needed       nicotine (NICODERM CQ) 14 MG/24HR 24 hr patch Place 1 patch onto the skin every 24 hours 90 patch 0     nystatin (MYCOSTATIN) 352080 UNIT/GM external cream Apply topically 2 times daily To toenails 90 g 6     order for DME Left foot 1 Units 0     order for DME Equipment being ordered: [id number] $92   Plantar, fasciitis, LG, night splint 1 each 0     order for DME Equipment being ordered: Challenger Wide walker if available - patient needs seat, basket and brakes. 1  each 0     ORDER FOR DME Use your CPAP device as directed by your provider. Pressure change to min 13 max [id number] 1 each 99     oxyCODONE-acetaminophen (PERCOCET) 5-325 MG per tablet Take 1 tablet by mouth every 8 hours as needed for moderate to severe pain (try to limit use, no further prescriptions until seen in pain clinic) 60 tablet 0     polyethylene glycol (MIRALAX/GLYCOLAX) powder        potassium chloride ER (KLOR-CON M) 20 MEQ CR tablet Take 2 tablets (40 mEq) by mouth 4 times daily 240 tablet 6     Respiratory Therapy Supplies (NEBULIZER COMPRESSOR) KIT 1 Device 4 times daily as needed. 1 kit 3     senna (SENOKOT) 8.6 MG tablet Take 1 tablet by mouth 2 times daily 45 tablet 0     spironolactone (ALDACTONE) 50 MG tablet Take 1 tablet (50 mg) by mouth daily 90 tablet 1     tiotropium (SPIRIVA HANDIHALER) 18 MCG inhalation capsule Inhale contents of one capsule daily. 30 capsule 1     topiramate (TOPAMAX) 50 MG tablet Take 3 tablets (150 mg) by mouth daily 90 tablet 1     TRULICITY 0.75 MG/0.5ML pen INJECT THE CONTENTS OF ONE SYRINGE ONCE DAILY EVERY 7 DAYS 2 mL 0     Family History  family history includes Cerebrovascular Disease in her mother; Diabetes in her brother, mother, and sister; Heart Disease in her brother, father, mother, and sister; Hypertension in her mother; Psychotic Disorder in her brother; Thyroid Disease in her brother, maternal aunt, maternal uncle, mother, sister, sister, and sister.    Social History  Smoke: yes.  ETOH: rare.  Lives with significant other.  No children.      Past Medical History  Past Medical History:   Diagnosis Date     A-fib (H) 2011    on coumadin since 1/13     Asthma     as a kid     Chest pain 2/1/2017     Chronic anticoagulation for a-fib 2/15/2013    INR's followed by coumadin clinic [facility]     Diabetes mellitus (H) 2012     Diastolic heart failure 2/15/2013     Dilated cardiomyopathy (H) 1/8/2013     HTN (hypertension)      Hyperthyroidism     Graves, s/p I131  1/13, now on prednisone and methimazole     Morbid obesity (H)      Pulmonary embolism (H) 1/12    hospitalized in [place], on lovenox/coumadin for a few months but stopped, hypercoag w/u neg per pt     Sleep apnea     using CPAP       Physical Exam    No exam today.    RESULTS  Creatinine   Date Value Ref Range Status   08/19/2020 1.00 0.52 - 1.04 mg/dL Final     GFR Estimate   Date Value Ref Range Status   08/19/2020 67 >60 mL/min/[1.73_m2] Final     Comment:     Non  GFR Calc  Starting 12/18/2018, serum creatinine based estimated GFR (eGFR) will be   calculated using the Chronic Kidney Disease Epidemiology Collaboration   (CKD-EPI) equation.       Hemoglobin A1C   Date Value Ref Range Status   08/19/2020 >14.0 (H) 0 - 5.6 % Final     Comment:     Normal <5.7% Prediabetes 5.7-6.4%  Diabetes 6.5% or higher - adopted from ADA   consensus guidelines.       Potassium   Date Value Ref Range Status   08/19/2020 4.4 3.4 - 5.3 mmol/L Final     ALT   Date Value Ref Range Status   08/19/2020 30 0 - 50 U/L Final     AST   Date Value Ref Range Status   08/19/2020 13 0 - 45 U/L Final     TSH   Date Value Ref Range Status   08/19/2020 17.34 (H) 0.40 - 4.00 mU/L Final     T4 Free   Date Value Ref Range Status   10/11/2018 0.95 0.76 - 1.46 ng/dL Final       Cholesterol   Date Value Ref Range Status   08/19/2020 206 (H) <200 mg/dL Final     Comment:     Desirable:       <200 mg/dl   10/11/2018 136 <200 mg/dL Final     HDL Cholesterol   Date Value Ref Range Status   08/19/2020 47 (L) >49 mg/dL Final   10/11/2018 37 (L) >49 mg/dL Final     LDL Cholesterol Calculated   Date Value Ref Range Status   08/19/2020 116 (H) <100 mg/dL Final     Comment:     Above desirable:  100-129 mg/dl  Borderline High:  130-159 mg/dL  High:             160-189 mg/dL  Very high:       >189 mg/dl     10/11/2018 70 <100 mg/dL Final     Comment:     Desirable:       <100 mg/dl     Triglycerides   Date Value Ref Range Status   08/19/2020 216 (H)  <150 mg/dL Final     Comment:     Borderline high:  150-199 mg/dl  High:             200-499 mg/dl  Very high:       >499 mg/dl     10/11/2018 143 <150 mg/dL Final     Cholesterol/HDL Ratio   Date Value Ref Range Status   07/14/2015 3.4 0.0 - 5.0 Final   01/06/2013 3.6 0.0 - 5.0 Final       A1C  > 14  8/19/2020  A1C > 16   8/2020  A1C 10.1   7/24/2019  A1C  8.4    10/11/2018  A1C  8.9    2/7/2018  A1C  8.8     2/7/2017  A1C  9.0    11/10/2016  A1C 10.7   6/16/2016  A1C 12.1   3/10/2016    ASSESSMENT/PLAN:    1.  TYPE 2 DIABETES MELLITUS: Uncontrolled type 2 diabetes mellitus.  Pricilla is to check her blood sugar 4 times daily due to poorly controlled diabetes and fluctuation in her glycemia control.  She was instructed to increase Toujeo 225 units subcutaneous each am, take her Novolog 100 units, plus correction with meals, Trulicity 1.5 mg subcutaneous weekly and Jardiance 25 mg each am.  I plan to call her again next week and will make additional insulin adjustments if needed.  If her blood sugar values do no improve, will plan to use U-500 insulin TID.  She did not tolerate Metformin due to GI distress.  Pt's LDL is 70 in Oct 2018. She is not taking a statin at this time.    2. RETINOPATHY: Seen here by [facility] on 8/25/2020 and dx with mild NPDR left eye withour macular edam. No retinopathy in right eye.    3.  HTN: No vitals today.Continue current RXs.     4.  CARDIOMYOPATHY/A FIB:  Pt followed here by Cardiology staff.      5. GRAVE'S DISEASE:  Hx of Grave's disease s/p I 131 in Jan 2013.    Pt became hypothyroid following above treatment and is taking  Levothyroxine 200 mcg 1 pill Monday - Saturday and 2 pills on Sundays.  Her TSH was normal in Oct 2018.  TSH was high on 8/19/2020. Will have pt take Levothyroxine 200 mcg 1 tab Monday- Friday and 2 tabs on Saturday and Sundays.  Recheck TSH in 8-10 weeks.    6.  OBESITY:  Morbid obesity with BMI > 70 kg/m2.  [facility].  She  needs to lose weight prior to being considered for bariatric surgery.    7.  FOLLOW UP : with me in 1 week.  Reminded pt I have ordered labs.            Again, thank you for allowing me to participate in the care of your patient.      Sincerely,    Isela Archibald PA-C

## 2020-09-14 ENCOUNTER — TELEPHONE (OUTPATIENT)
Dept: ENDOCRINOLOGY | Facility: CLINIC | Age: 45
End: 2020-09-14

## 2020-09-14 NOTE — TELEPHONE ENCOUNTER
[facility]    Phone Message    May a detailed message be left on voicemail: yes     Reason for Call: Other: Pricilla calling as she was not aware that she had an appointment today with Isela Archibald.  She states she never got a call from her as of yet.  Please call her back at your earliest convenience.     Action Taken: Message routed to:  [facility] ([facility]): TING Marino    Travel Screening: Not Applicable

## 2020-09-16 ENCOUNTER — MYC REFILL (OUTPATIENT)
Dept: INTERNAL MEDICINE | Facility: CLINIC | Age: 45
End: 2020-09-16

## 2020-09-16 DIAGNOSIS — E11.42 TYPE 2 DIABETES MELLITUS WITH DIABETIC POLYNEUROPATHY, WITHOUT LONG-TERM CURRENT USE OF INSULIN (H): ICD-10-CM

## 2020-09-21 RX ORDER — INSULIN GLARGINE 300 U/ML
INJECTION, SOLUTION SUBCUTANEOUS
Qty: 27 ML | Refills: 3 | Status: SHIPPED | OUTPATIENT
Start: 2020-09-21 | End: 2021-03-22

## 2020-09-22 ENCOUNTER — PRE VISIT (OUTPATIENT)
Dept: OPHTHALMOLOGY | Facility: CLINIC | Age: 45
End: 2020-09-22

## 2020-09-22 NOTE — TELEPHONE ENCOUNTER
FUTURE VISIT INFORMATION      FUTURE VISIT INFORMATION:    Date: 9/22/20    Time: 1:00pm    Location: [facility]  REFERRAL INFORMATION:    Referring provider:  Ursula    Referring providers clinic:  [facility]    Reason for visit/diagnosis  Cataract Consultation & OCT MACULA    RECORDS REQUESTED FROM:         Clinic name Comments Records Status Imaging Status   [facility] Ov/referral 8/25/20  OV/notes 2/11/14-8/25/20 EPIC

## 2020-09-23 NOTE — TELEPHONE ENCOUNTER
FUTURE VISIT INFORMATION      FUTURE VISIT INFORMATION:    Date: 10/6/20    Time: 8:00am    Location: [facility]  REFERRAL INFORMATION:    Referring provider:  Ursula    Referring providers clinic:  [facility]    Reason for visit/diagnosis  Cataract Consultation & OCT MACULA     RECORDS REQUESTED FROM:         Clinic name Comments Records Status Imaging Status   [facility] Ov/referral 8/25/20  OV/notes 2/11/14-8/25/20 EPIC

## 2020-09-27 NOTE — PROGRESS NOTES
Service Date: 2020      Pedro Bolton MD   [facility]    [address] [place] [address]      Medical Director   [facility]     [address] [place] [address]        Kristen Cuello NP   [facility]   [address], [address] [place] [address]       Blayne Evert   Coagulation Clinic       NANETTE Chin   [facility]inology    [address]   [place] [address]      Jamilah Bernal MD   [facility] Internal Medicine   [address] [place] [address]      RE: Pricilla Brown    MRN: [id number]   : 1975    Atrial fibrillation    SOB   Chronic diastolic heart failure    Dilated cardiomyopathy    Chronic atrial fibrillation    Cellulitis  Long-term  use of anticoagulants    Plantar fasciitis  Neuropathic pain of lower extremity  Peritonsillar abscess  Asthma  Azotemia  Hypothyroidism following radioiodine therapy  JYOTI (obstructive sleep apnea) CPAP Min Pressure of 13 and Max Pressure of 18  Chronic anticoagulation for a-fib  Morbid obesity    Diabetes mellitus, type 2    Plan:  1. Right heart catherization to assess volume status, risk benefits discussed//all questions answered  2. Letter sent to [facility] to ask for new patient visit  3. Consider admission for diuresis and diabetes control    Dear Colleagues:     I had the opportunity to talk with your patient Pricilla Brown  for 30 minutes from 4 PM to 4:30 PM with her permission (visit carried out on  as we could not find a mutually agreeable time)      Briefly, Ms. Brown is a 45-year-old AA female who weighs approximately 500 pounds.  She has intercurrent medical problems as noted above.  In addition, she has a history of medical noncompliance and difficulty in keeping medical appointments.  On the other hand, she is deeply desirous of obtaining bariatric surgery which would be expected to afford her an increase in quality of life and probably  length of life    Current Outpatient Medications   Medication     acetaminophen (TYLENOL) 325 MG tablet     albuterol (PROAIR HFA/PROVENTIL HFA/VENTOLIN HFA) 108 (90 Base) MCG/ACT inhaler     bisacodyl (DULCOLAX) 10 MG suppository     blood glucose (ACCU-CHEK RON PLUS) test strip     blood glucose (NO BRAND SPECIFIED) lancets standard     blood glucose (NO BRAND SPECIFIED) test strip     blood glucose monitoring (IBG STAR) meter device kit     blood glucose monitoring (NO BRAND SPECIFIED) meter device kit     bumetanide (BUMEX) 1 MG tablet     buPROPion (WELLBUTRIN SR) 100 MG 12 hr tablet     capsaicin (ZOSTRIX) 0.025 % external cream     ciclopirox (LOPROX) 0.77 % cream     ciclopirox (PENLAC) 8 % external solution     clindamycin (CLEOCIN) 300 MG capsule     clotrimazole (LOTRIMIN) 1 % external cream     colchicine (COLCYRS) 0.6 MG tablet     cyclobenzaprine (FLEXERIL) 10 MG tablet     diclofenac (VOLTAREN) 1 % topical gel     dulaglutide (TRULICITY) 1.5 MG/0.5ML pen     DULoxetine (CYMBALTA) 20 MG capsule     Efinaconazole 10 % SOLN     empagliflozin (JARDIANCE) 25 MG TABS tablet     gabapentin (NEURONTIN) 300 MG capsule     hydrALAZINE (APRESOLINE) 25 MG tablet     insulin aspart (NOVOLOG FLEXPEN) 100 UNIT/ML pen     insulin glargine U-300 (TOUJEO SOLOSTAR) 300 UNIT/ML (1 units dial) pen     insulin pen needle (BD RIVER U/F) 32G X 4 MM miscellaneous     isosorbide dinitrate (ISORDIL) 20 MG tablet     levothyroxine (SYNTHROID/LEVOTHROID) 200 MCG tablet     metolazone (ZAROXOLYN) 2.5 MG tablet     metoprolol succinate ER (TOPROL-XL) 200 MG 24 hr tablet     morphine (MS CONTIN) 15 MG 12 hr tablet     nicotine (NICODERM CQ) 14 MG/24HR 24 hr patch     nystatin (MYCOSTATIN) 113546 UNIT/GM external cream     order for DME     order for DME     order for DME     ORDER FOR DME     oxyCODONE-acetaminophen (PERCOCET) 5-325 MG per tablet     polyethylene glycol (MIRALAX/GLYCOLAX) powder     potassium chloride ER (KLOR-CON  M) 20 MEQ CR tablet     Respiratory Therapy Supplies (NEBULIZER COMPRESSOR) KIT     senna (SENOKOT) 8.6 MG tablet     spironolactone (ALDACTONE) 50 MG tablet     tiotropium (SPIRIVA HANDIHALER) 18 MCG inhalation capsule     topiramate (TOPAMAX) 50 MG tablet     TRULICITY 0.75 MG/0.5ML pen     No current facility-administered medications for this visit.      Facility-Administered Medications Ordered in Other Visits   Medication     sodium chloride (PF) 0.9% PF flush 10 mL     Results for BRAXTON SALMERON (MRN [id number]) as of 9/27/2020 15:44   Ref. Range 8/19/2020 16:26   Sodium Latest Ref Range: 133 - 144 mmol/L 128 (L)   Potassium Latest Ref Range: 3.4 - 5.3 mmol/L 4.4   Chloride Latest Ref Range: 94 - 109 mmol/L 95   Carbon Dioxide Latest Ref Range: 20 - 32 mmol/L 26   Urea Nitrogen Latest Ref Range: 7 - 30 mg/dL 14   Creatinine Latest Ref Range: 0.52 - 1.04 mg/dL 1.00   GFR Estimate Latest Ref Range: >60 mL/min/1.73_m2 67   GFR Estimate If Black Latest Ref Range: >60 mL/min/1.73_m2 78   Calcium Latest Ref Range: 8.5 - 10.1 mg/dL 8.7   Anion Gap Latest Ref Range: 3 - 14 mmol/L 6   Albumin Latest Ref Range: 3.4 - 5.0 g/dL 2.9 (L)   Protein Total Latest Ref Range: 6.8 - 8.8 g/dL 8.0   Bilirubin Total Latest Ref Range: 0.2 - 1.3 mg/dL 0.5   Alkaline Phosphatase Latest Ref Range: 40 - 150 U/L 73   ALT Latest Ref Range: 0 - 50 U/L 30   AST Latest Ref Range: 0 - 45 U/L 13   Hemoglobin A1C Latest Ref Range: 0 - 5.6 % >14.0 (H)   Cholesterol Latest Ref Range: <200 mg/dL 206 (H)   HDL Cholesterol Latest Ref Range: >49 mg/dL 47 (L)   Iron Latest Ref Range: 35 - 180 ug/dL 64   Iron Binding Cap Latest Ref Range: 240 - 430 ug/dL 270   Iron Saturation Index Latest Ref Range: 15 - 46 % 24   LDL Cholesterol Calculated Latest Ref Range: <100 mg/dL 116 (H)   Non HDL Cholesterol Latest Ref Range: <130 mg/dL 159 (H)   N-Terminal Pro Bnp Latest Ref Range: 0 - 125 pg/mL 100   Triglycerides Latest Ref Range: <150 mg/dL 216 (H)   TSH  Latest Ref Range: 0.40 - 4.00 mU/L 17.34 (H)   Glucose Latest Ref Range: 70 - 99 mg/dL 472 (H)   WBC Latest Ref Range: 4.0 - 11.0 10e9/L 9.3   Hemoglobin Latest Ref Range: 11.7 - 15.7 g/dL 15.6   Hematocrit Latest Ref Range: 35.0 - 47.0 % 48.8 (H)   Platelet Count Latest Ref Range: 150 - 450 10e9/L 243   RBC Count Latest Ref Range: 3.8 - 5.2 10e12/L 5.10   MCV Latest Ref Range: 78 - 100 fl 96   MCH Latest Ref Range: 26.5 - 33.0 pg 30.6   MCHC Latest Ref Range: 31.5 - 36.5 g/dL 32.0   RDW Latest Ref Range: 10.0 - 15.0 % 13.4   Diff Method Unknown Automated Method   % Neutrophils Latest Units: % 73.3   % Lymphocytes Latest Units: % 18.0   % Monocytes Latest Units: % 6.6   % Eosinophils Latest Units: % 1.0   % Basophils Latest Units: % 0.5   % Immature Granulocytes Latest Units: % 0.6   Nucleated RBCs Latest Ref Range: 0 /100 0   Absolute Neutrophil Latest Ref Range: 1.6 - 8.3 10e9/L 6.8   Absolute Lymphocytes Latest Ref Range: 0.8 - 5.3 10e9/L 1.7   Absolute Monocytes Latest Ref Range: 0.0 - 1.3 10e9/L 0.6   Absolute Eosinophils Latest Ref Range: 0.0 - 0.7 10e9/L 0.1   Absolute Basophils Latest Ref Range: 0.0 - 0.2 10e9/L 0.1   Abs Immature Granulocytes Latest Ref Range: 0 - 0.4 10e9/L 0.1   Absolute Nucleated RBC Unknown 0.0   INR Latest Ref Range: 0.86 - 1.14  0.99        Currently, she is seen by Weight Loss, Diabetes, Internal Medicine, Cardiology and Sleep Medicine in a fragmented way, partially as a problem of structure within academic medical clinics.  I am therefore proposing that [facility] may be a better environment for her, where her healthcare needs can be more uniformly reinforced in a holistic way.  In addition, I believe that [facility] has perhaps more resources available to continue to  and support Pricilla.      When seen today, her weight is stable.  She has obtained a new sleep apnea mask, which has improved her excessive daytime somnolence.  We also discussed the fact that it is virtually  impossible to lose weight with undertreated sleep apnea.  I asked her to contact the Sleep people to further discuss her mask and as to whether she needs another sleep study now that she has a fitted mask.  She has been recently seen by the Diabetes Service.  She is on a multidrug regimen of oral and subcutaneous medications.  She does not have polyuria, polydipsia or polyphagia.  She has not been hospitalized for metabolic abnormalities.        She has no interim history of chest pain, chest tightness or paroxysmal nocturnal dyspnea.  She has chronic lymphedema.  She has had recently excessive daytime somnolence.  There is no history of arrhythmia other than palpitations that she associates with paroxysmal atrial fibrillation.  She has previously been on anticoagulation; however, she was dropped from the Anticoagulation Clinic because of inconsistent visits.  Prior to doing this, I talked with both the Coagulation Clinic and Ms. Brown and agreed that her use of anticoagulants in an unsupervised and irregular manner was potentially quite dangerous to her.      I reviewed her medications.  She is generally taking them as outlined.      Until our recent visit she has had no laboratories drawn since October of last year.  We talked about laboratory regularity and the need for monitoring for medication toxicity.  I ordered laboratories for next week.      We had a 15 minute discussion about the advantages that [facility] could offer her.  She is willing to work in that environment.      We also talked about her no-show record and the fact that this would probably interfere with her getting bariatric surgery.  She is still a long way from being a candidate for bariatric surgery, as her weight would have to be down to the 450-470 range before bariatric surgery is likely to occur.      She has no interim history of chest pain, tightness or heaviness.  She sleeps many nights in a reclining chair.      She is using COVID  precautions.      Medications were reviewed and as are listed in the chart.        I recommended:   1.  A new patient visit at [facility].   2.  Followup phone call with us in 2 months.   3.  I would not reinstitute anticoagulants in an unsupervised manner.  She thoroughly understands the risks and benefits of both non-warfarin and warfarin oral anticoagulants.   4.  She is to call the Sleep Medicine people to schedule a followup visit at a suitable time after she has changed masks and to assess whether she needs augmented settings to achieve satisfactory sleep hygiene and outcomes.      I would ask [facility] to make a new patient appointment for her.  Were I doing primary care, this is a patient that I would have come back every week for 6-8 weeks to support her in her quest to change her lifestyle and choices about followup.  She understands that I believe that should she not show up at medical appointments that she is eliminating the opportunity to have bariatric surgery.      Were she to get to the required 450-460 pound level, she will need a CT coronary angiogram at minimum in view of her history of multiple poorly treated risk factors.           MD HCERYL Brooks MD

## 2020-09-28 ENCOUNTER — OFFICE VISIT (OUTPATIENT)
Dept: ORTHOPEDICS | Facility: CLINIC | Age: 45
End: 2020-09-28
Payer: MEDICARE

## 2020-09-28 ENCOUNTER — VIRTUAL VISIT (OUTPATIENT)
Dept: CARDIOLOGY | Facility: CLINIC | Age: 45
End: 2020-09-28
Attending: INTERNAL MEDICINE
Payer: MEDICARE

## 2020-09-28 VITALS — BODY MASS INDEX: 76.74 KG/M2 | WEIGHT: 293 LBS

## 2020-09-28 DIAGNOSIS — M79.671 PAIN IN BOTH FEET: ICD-10-CM

## 2020-09-28 DIAGNOSIS — R60.0 PERIPHERAL EDEMA: ICD-10-CM

## 2020-09-28 DIAGNOSIS — I87.8 VENOUS STASIS: ICD-10-CM

## 2020-09-28 DIAGNOSIS — M21.969 TYPE 2 DIABETES MELLITUS WITH DIABETIC FOOT DEFORMITY (H): ICD-10-CM

## 2020-09-28 DIAGNOSIS — B35.3 TINEA PEDIS OF BOTH FEET: ICD-10-CM

## 2020-09-28 DIAGNOSIS — B35.1 DERMATOPHYTOSIS OF NAIL: Primary | ICD-10-CM

## 2020-09-28 DIAGNOSIS — E11.49 TYPE II OR UNSPECIFIED TYPE DIABETES MELLITUS WITH NEUROLOGICAL MANIFESTATIONS, NOT STATED AS UNCONTROLLED(250.60) (H): ICD-10-CM

## 2020-09-28 DIAGNOSIS — E11.69 TYPE 2 DIABETES MELLITUS WITH DIABETIC FOOT DEFORMITY (H): ICD-10-CM

## 2020-09-28 DIAGNOSIS — M79.672 PAIN IN BOTH FEET: ICD-10-CM

## 2020-09-28 DIAGNOSIS — R79.89 ELEVATED TSH: Primary | ICD-10-CM

## 2020-09-28 PROCEDURE — 99443 ZZC PHYSICIAN TELEPHONE EVALUATION 21-30 MIN: CPT | Mod: 95 | Performed by: INTERNAL MEDICINE

## 2020-09-28 RX ORDER — CICLOPIROX OLAMINE 7.7 MG/G
CREAM TOPICAL 2 TIMES DAILY
Qty: 90 G | Refills: 5 | Status: SHIPPED | OUTPATIENT
Start: 2020-09-28 | End: 2022-01-01

## 2020-09-28 NOTE — PROGRESS NOTES
Past Medical History:   Diagnosis Date     A-fib (H) 2011    on coumadin since 1/13     Asthma     as a kid     Chest pain 2/1/2017     Chronic anticoagulation for a-fib 2/15/2013    INR's followed by coumadin clinic at      Diabetes mellitus (H) 2012     Diastolic heart failure 2/15/2013     Dilated cardiomyopathy (H) 1/8/2013     HTN (hypertension)      Hyperthyroidism     Graves, s/p I131 1/13, now on prednisone and methimazole     Morbid obesity (H)      Pulmonary embolism (H) 1/12    hospitalized in [place], on lovenox/coumadin for a few months but stopped, hypercoag w/u neg per pt     Sleep apnea     using CPAP     Patient Active Problem List   Diagnosis     Other chronic pulmonary heart diseases     Dilated cardiomyopathy (H)     Morbid obesity (H)     Diabetes mellitus without complication (H)     Chronic diastolic heart failure (H)     Chronic anticoagulation for a-fib     JYOTI (obstructive sleep apnea) CPAP Min Pressure of 13 and Max Pressure of 18     Hypothyroidism following radioiodine therapy     SOB (shortness of breath)     Azotemia     Asthma     Peritonsillar abscess     Plantar fasciitis     Neuropathic pain of lower extremity     Atrial fibrillation (H) [I48.91]     Long-term (current) use of anticoagulants [Z79.01]     Cellulitis     Chest pain     Foot pain     Class 3 obesity due to excess calories with serious comorbidity and body mass index (BMI) greater than or equal to 70 in adult     Thyrotoxicosis     Respiratory abnormality     Personal history of tobacco use, presenting hazards to health     Healthcare maintenance     Cocaine abuse (H)     No past surgical history on file.  Social History     Socioeconomic History     Marital status: Single     Spouse name: Not on file     Number of children: Not on file     Years of education: Not on file     Highest education level: Not on file   Occupational History     Not on file   Social Needs     Financial resource strain: Not on file     Food  insecurity     Worry: Not on file     Inability: Not on file     Transportation needs     Medical: Not on file     Non-medical: Not on file   Tobacco Use     Smoking status: Light Tobacco Smoker     Packs/day: 0.25     Years: 13.00     Pack years: 3.25     Types: Cigarettes     Smokeless tobacco: Never Used     Tobacco comment: down to 5 cigs   Substance and Sexual Activity     Alcohol use: No     Drug use: No     Sexual activity: Yes     Partners: Male     Birth control/protection: Condom   Lifestyle     Physical activity     Days per week: Not on file     Minutes per session: Not on file     Stress: Not on file   Relationships     Social connections     Talks on phone: Not on file     Gets together: Not on file     Attends Advent service: Not on file     Active member of club or organization: Not on file     Attends meetings of clubs or organizations: Not on file     Relationship status: Not on file     Intimate partner violence     Fear of current or ex partner: Not on file     Emotionally abused: Not on file     Physically abused: Not on file     Forced sexual activity: Not on file   Other Topics Concern     Parent/sibling w/ CABG, MI or angioplasty before 65F 55M? Not Asked   Social History Narrative    Single, no children        Gyn:        Last pap several years ago, no abnormal    No STIs            Patient is single.  She is no longer working.  She used to work in the area of customer service.  She is currently living with her sister in [place], [place].  She has no pets.  Patient has smoked a half pack of cigarettes a day for the past 10 plus years.  She states that she is down to 5 cigarettes a day with the aid of Wellbutrin.  She does not smoke cigars, pipes or chew tobacco.  She has 1 cup of coffee in the morning.  She does not drink alcohol.  Patient does not exercise.      Family History   Problem Relation Age of Onset     Thyroid Disease Mother         Grave's D     Diabetes Mother       Heart Disease Mother      Cerebrovascular Disease Mother         dec. 54 yo     Hypertension Mother      Heart Disease Sister         Heart Murmur     Diabetes Sister      Heart Disease Father         dec in his 30s, MI     Psychotic Disorder Brother         Bipolar     Diabetes Brother      Thyroid Disease Brother         Hyper Thyroid     Heart Disease Brother      Thyroid Disease Sister         Hyper Thyroid     Thyroid Disease Sister         Hyper Thyroid     Thyroid Disease Sister         Mental Health Problems     Thyroid Disease Maternal Aunt      Thyroid Disease Maternal Uncle      Cancer No family hx of      Glaucoma No family hx of      Macular Degeneration No family hx of      Lab Results   Component Value Date    A1C >14.0 08/19/2020    A1C 8.4 10/11/2018    A1C 9.6 05/29/2018    A1C 9.0 04/07/2017    A1C 12.1 03/10/2016     Lab Results   Component Value Date    WBC 9.3 08/19/2020     Lab Results   Component Value Date    RBC 5.10 08/19/2020     Lab Results   Component Value Date    HGB 15.6 08/19/2020     Lab Results   Component Value Date    HCT 48.8 08/19/2020     No components found for: MCT  Lab Results   Component Value Date    MCV 96 08/19/2020     Lab Results   Component Value Date    MCH 30.6 08/19/2020     Lab Results   Component Value Date    MCHC 32.0 08/19/2020     Lab Results   Component Value Date    RDW 13.4 08/19/2020     Lab Results   Component Value Date     08/19/2020     Last Comprehensive Metabolic Panel:  Sodium   Date Value Ref Range Status   08/19/2020 128 (L) 133 - 144 mmol/L Final     Potassium   Date Value Ref Range Status   08/19/2020 4.4 3.4 - 5.3 mmol/L Final     Chloride   Date Value Ref Range Status   08/19/2020 95 94 - 109 mmol/L Final     Carbon Dioxide   Date Value Ref Range Status   08/19/2020 26 20 - 32 mmol/L Final     Anion Gap   Date Value Ref Range Status   08/19/2020 6 3 - 14 mmol/L Final     Glucose   Date Value Ref Range Status   08/19/2020 472 (H)  70 - 99 mg/dL Final     Urea Nitrogen   Date Value Ref Range Status   08/19/2020 14 7 - 30 mg/dL Final     Creatinine   Date Value Ref Range Status   08/19/2020 1.00 0.52 - 1.04 mg/dL Final     GFR Estimate   Date Value Ref Range Status   08/19/2020 67 >60 mL/min/[1.73_m2] Final     Comment:     Non  GFR Calc  Starting 12/18/2018, serum creatinine based estimated GFR (eGFR) will be   calculated using the Chronic Kidney Disease Epidemiology Collaboration   (CKD-EPI) equation.       Calcium   Date Value Ref Range Status   08/19/2020 8.7 8.5 - 10.1 mg/dL Final     Lab Results   Component Value Date    AST 13 08/19/2020     Lab Results   Component Value Date    ALT 30 08/19/2020     Lab Results   Component Value Date    BILICONJ 0.0 10/17/2013      Lab Results   Component Value Date    BILITOTAL 0.5 08/19/2020     Lab Results   Component Value Date    ALBUMIN 2.9 08/19/2020     Lab Results   Component Value Date    PROTTOTAL 8.0 08/19/2020      Lab Results   Component Value Date    ALKPHOS 73 08/19/2020     SUBJECTIVE FINDINGS:  A 45-year-old female returns to clinic for diabetic foot check, onychomycosis.  She relates she needs new diabetic shoes; it has been a long time since she has had those.  She relates no ulcers or sores since we have seen her last.  Her nails are long and they hurt; they catch on her shoes and her socks.  She relates she has also been getting some swelling in her legs with some discoloration she is concerned about.  She relates no specific injuries, relieving or aggravating factors. S he relates she does have peripheral numbness and tingling in her feet.  She relates she is not wearing compression socks.  She is not using any lotion on her feet currently.        OBJECTIVE FINDINGS:  DP and PT are 2/4 bilaterally.  She has some venous stasis with peripheral edema bilaterally and hemosiderin discoloration.  She has dry, scaly skin with hyperkeratotic tissue buildup, mostly on the  heels, but in a moccasin pattern on her feet bilaterally.  She has long, incurvated, dystrophic, thickened, brittle nails with subungual debris, dystrophy and discoloration to differing degrees bilaterally, mostly on the hallux nails.  Sharp/dull is intact with 5.07 [place] monofilament bilaterally.  Deep tendon reflexes are intact bilaterally.  She has dorsally contracted digits 2-5 bilaterally.  She has a flexible flat-foot type bilaterally.  She relates she has generalized neuropathy symptoms and pain in her feet.  She has dorsally contracted digits bilaterally.  No gross tendon voids bilaterally.      ASSESSMENT AND PLAN:  Onychauxis and onychomycosis bilaterally.  She is diabetic with peripheral neuropathy.  Her protective sensation is intact.  She has tinea pedis changes bilaterally with callus.  She has venous stasis with peripheral edema bilaterally.  She is diabetic with hammertoes and flat foot present.  Diagnosis and treatment options discussed with the patient.  All the nails were debrided or reduced bilaterally upon consent 1-5.  Prescription for compression socks 15-20 mmHg given and use discussed with her.  I am going to start with the lowest compression, so she can see how easy or hard they are to get on and wear.  Prescription for Loprox cream given and use discussed with her.  Prescription for diabetic shoes and inserts given, and she is given the phone number and address to [facility] to get fitted for those.  She will return to clinic and see me in about 2 months.

## 2020-09-28 NOTE — LETTER
2020      RE: Pricilla Brown  [address]  [facility] [address]       Dear Colleague,    Thank you for the opportunity to participate in the care of your patient, Pricilla Brown, at the [facility] HEART [facility]. Please see a copy of my visit note below.    Service Date: 2020      Pedro Bolton MD   [facility] Surgery    [address] [id number]      Medical Director   [facility]     [address] [place] [id number]        Kristen Cuello NP   [facility] Heart at [facility]   [address], [address] [place] [address]       [facility]   Coagulation Clinic       NANETTE Chin   [facility] Endocrinology    [address]   [place] [address]      Jamilah Bernal MD   [facility] Internal Medicine   [address] [place] [address]      RE: Pricilla Brown    MRN: [id number]   : 1975    Atrial fibrillation    SOB   Chronic diastolic heart failure    Dilated cardiomyopathy    Chronic atrial fibrillation    Cellulitis  Long-term  use of anticoagulants    Plantar fasciitis  Neuropathic pain of lower extremity  Peritonsillar abscess  Asthma  Azotemia  Hypothyroidism following radioiodine therapy  JYOTI (obstructive sleep apnea) CPAP Min Pressure of 13 and Max Pressure of 18  Chronic anticoagulation for a-fib  Morbid obesity    Diabetes mellitus, type 2    Plan:  1. Right heart catherization to assess volume status, risk benefits discussed//all questions answered  2. Letter sent to [facility] to ask for new patient visit  3. Consider admission for diuresis and diabetes control    Dear Colleagues:     I had the opportunity to talk with your patient Pricilla Brown  for 30 minutes from 4 PM to 4:30 PM with her permission (visit carried out on  as we could not find a mutually agreeable time)      Briefly, Ms. Brown is a 45-year-old AA female who weighs approximately 500  pounds.  She has intercurrent medical problems as noted above.  In addition, she has a history of medical noncompliance and difficulty in keeping medical appointments.  On the other hand, she is deeply desirous of obtaining bariatric surgery which would be expected to afford her an increase in quality of life and probably length of life    Current Outpatient Medications   Medication     acetaminophen (TYLENOL) 325 MG tablet     albuterol (PROAIR HFA/PROVENTIL HFA/VENTOLIN HFA) 108 (90 Base) MCG/ACT inhaler     bisacodyl (DULCOLAX) 10 MG suppository     blood glucose (ACCU-CHEK RON PLUS) test strip     blood glucose (NO BRAND SPECIFIED) lancets standard     blood glucose (NO BRAND SPECIFIED) test strip     blood glucose monitoring (IBG STAR) meter device kit     blood glucose monitoring (NO BRAND SPECIFIED) meter device kit     bumetanide (BUMEX) 1 MG tablet     buPROPion (WELLBUTRIN SR) 100 MG 12 hr tablet     capsaicin (ZOSTRIX) 0.025 % external cream     ciclopirox (LOPROX) 0.77 % cream     ciclopirox (PENLAC) 8 % external solution     clindamycin (CLEOCIN) 300 MG capsule     clotrimazole (LOTRIMIN) 1 % external cream     colchicine (COLCYRS) 0.6 MG tablet     cyclobenzaprine (FLEXERIL) 10 MG tablet     diclofenac (VOLTAREN) 1 % topical gel     dulaglutide (TRULICITY) 1.5 MG/0.5ML pen     DULoxetine (CYMBALTA) 20 MG capsule     Efinaconazole 10 % SOLN     empagliflozin (JARDIANCE) 25 MG TABS tablet     gabapentin (NEURONTIN) 300 MG capsule     hydrALAZINE (APRESOLINE) 25 MG tablet     insulin aspart (NOVOLOG FLEXPEN) 100 UNIT/ML pen     insulin glargine U-300 (TOUJEO SOLOSTAR) 300 UNIT/ML (1 units dial) pen     insulin pen needle (BD RIVER U/F) 32G X 4 MM miscellaneous     isosorbide dinitrate (ISORDIL) 20 MG tablet     levothyroxine (SYNTHROID/LEVOTHROID) 200 MCG tablet     metolazone (ZAROXOLYN) 2.5 MG tablet     metoprolol succinate ER (TOPROL-XL) 200 MG 24 hr tablet     morphine (MS CONTIN) 15 MG 12 hr tablet      nicotine (NICODERM CQ) 14 MG/24HR 24 hr patch     nystatin (MYCOSTATIN) 445849 UNIT/GM external cream     order for DME     order for DME     order for DME     ORDER FOR DME     oxyCODONE-acetaminophen (PERCOCET) 5-325 MG per tablet     polyethylene glycol (MIRALAX/GLYCOLAX) powder     potassium chloride ER (KLOR-CON M) 20 MEQ CR tablet     Respiratory Therapy Supplies (NEBULIZER COMPRESSOR) KIT     senna (SENOKOT) 8.6 MG tablet     spironolactone (ALDACTONE) 50 MG tablet     tiotropium (SPIRIVA HANDIHALER) 18 MCG inhalation capsule     topiramate (TOPAMAX) 50 MG tablet     TRULICITY 0.75 MG/0.5ML pen     No current facility-administered medications for this visit.      Facility-Administered Medications Ordered in Other Visits   Medication     sodium chloride (PF) 0.9% PF flush 10 mL     Results for BRAXTON SALMERON (MRN [id number]) as of 9/27/2020 15:44   Ref. Range 8/19/2020 16:26   Sodium Latest Ref Range: 133 - 144 mmol/L 128 (L)   Potassium Latest Ref Range: 3.4 - 5.3 mmol/L 4.4   Chloride Latest Ref Range: 94 - 109 mmol/L 95   Carbon Dioxide Latest Ref Range: 20 - 32 mmol/L 26   Urea Nitrogen Latest Ref Range: 7 - 30 mg/dL 14   Creatinine Latest Ref Range: 0.52 - 1.04 mg/dL 1.00   GFR Estimate Latest Ref Range: >60 mL/min/1.73_m2 67   GFR Estimate If Black Latest Ref Range: >60 mL/min/1.73_m2 78   Calcium Latest Ref Range: 8.5 - 10.1 mg/dL 8.7   Anion Gap Latest Ref Range: 3 - 14 mmol/L 6   Albumin Latest Ref Range: 3.4 - 5.0 g/dL 2.9 (L)   Protein Total Latest Ref Range: 6.8 - 8.8 g/dL 8.0   Bilirubin Total Latest Ref Range: 0.2 - 1.3 mg/dL 0.5   Alkaline Phosphatase Latest Ref Range: 40 - 150 U/L 73   ALT Latest Ref Range: 0 - 50 U/L 30   AST Latest Ref Range: 0 - 45 U/L 13   Hemoglobin A1C Latest Ref Range: 0 - 5.6 % >14.0 (H)   Cholesterol Latest Ref Range: <200 mg/dL 206 (H)   HDL Cholesterol Latest Ref Range: >49 mg/dL 47 (L)   Iron Latest Ref Range: 35 - 180 ug/dL 64   Iron Binding Cap Latest Ref  Range: 240 - 430 ug/dL 270   Iron Saturation Index Latest Ref Range: 15 - 46 % 24   LDL Cholesterol Calculated Latest Ref Range: <100 mg/dL 116 (H)   Non HDL Cholesterol Latest Ref Range: <130 mg/dL 159 (H)   N-Terminal Pro Bnp Latest Ref Range: 0 - 125 pg/mL 100   Triglycerides Latest Ref Range: <150 mg/dL 216 (H)   TSH Latest Ref Range: 0.40 - 4.00 mU/L 17.34 (H)   Glucose Latest Ref Range: 70 - 99 mg/dL 472 (H)   WBC Latest Ref Range: 4.0 - 11.0 10e9/L 9.3   Hemoglobin Latest Ref Range: 11.7 - 15.7 g/dL 15.6   Hematocrit Latest Ref Range: 35.0 - 47.0 % 48.8 (H)   Platelet Count Latest Ref Range: 150 - 450 10e9/L 243   RBC Count Latest Ref Range: 3.8 - 5.2 10e12/L 5.10   MCV Latest Ref Range: 78 - 100 fl 96   MCH Latest Ref Range: 26.5 - 33.0 pg 30.6   MCHC Latest Ref Range: 31.5 - 36.5 g/dL 32.0   RDW Latest Ref Range: 10.0 - 15.0 % 13.4   Diff Method Unknown Automated Method   % Neutrophils Latest Units: % 73.3   % Lymphocytes Latest Units: % 18.0   % Monocytes Latest Units: % 6.6   % Eosinophils Latest Units: % 1.0   % Basophils Latest Units: % 0.5   % Immature Granulocytes Latest Units: % 0.6   Nucleated RBCs Latest Ref Range: 0 /100 0   Absolute Neutrophil Latest Ref Range: 1.6 - 8.3 10e9/L 6.8   Absolute Lymphocytes Latest Ref Range: 0.8 - 5.3 10e9/L 1.7   Absolute Monocytes Latest Ref Range: 0.0 - 1.3 10e9/L 0.6   Absolute Eosinophils Latest Ref Range: 0.0 - 0.7 10e9/L 0.1   Absolute Basophils Latest Ref Range: 0.0 - 0.2 10e9/L 0.1   Abs Immature Granulocytes Latest Ref Range: 0 - 0.4 10e9/L 0.1   Absolute Nucleated RBC Unknown 0.0   INR Latest Ref Range: 0.86 - 1.14  0.99        Currently, she is seen by Weight Loss, Diabetes, Internal Medicine, Cardiology and Sleep Medicine in a fragmented way, partially as a problem of structure within academic medical clinics.  I am therefore proposing that [facility] may be a better environment for her, where her healthcare needs can be more uniformly reinforced  in a holistic way.  In addition, I believe that [facility] has perhaps more resources available to continue to  and support Pricilla.      When seen today, her weight is stable.  She has obtained a new sleep apnea mask, which has improved her excessive daytime somnolence.  We also discussed the fact that it is virtually impossible to lose weight with undertreated sleep apnea.  I asked her to contact the Sleep people to further discuss her mask and as to whether she needs another sleep study now that she has a fitted mask.  She has been recently seen by the Diabetes Service.  She is on a multidrug regimen of oral and subcutaneous medications.  She does not have polyuria, polydipsia or polyphagia.  She has not been hospitalized for metabolic abnormalities.        She has no interim history of chest pain, chest tightness or paroxysmal nocturnal dyspnea.  She has chronic lymphedema.  She has had recently excessive daytime somnolence.  There is no history of arrhythmia other than palpitations that she associates with paroxysmal atrial fibrillation.  She has previously been on anticoagulation; however, she was dropped from the Anticoagulation Clinic because of inconsistent visits.  Prior to doing this, I talked with both the Coagulation Clinic and Ms. Brown and agreed that her use of anticoagulants in an unsupervised and irregular manner was potentially quite dangerous to her.      I reviewed her medications.  She is generally taking them as outlined.      Until our recent visit she has had no laboratories drawn since October of last year.  We talked about laboratory regularity and the need for monitoring for medication toxicity.  I ordered laboratories for next week.      We had a 15 minute discussion about the advantages that [facility] could offer her.  She is willing to work in that environment.      We also talked about her no-show record and the fact that this would probably interfere with her getting bariatric  surgery.  She is still a long way from being a candidate for bariatric surgery, as her weight would have to be down to the 450-470 range before bariatric surgery is likely to occur.      She has no interim history of chest pain, tightness or heaviness.  She sleeps many nights in a reclining chair.      She is using COVID precautions.      Medications were reviewed and as are listed in the chart.        I recommended:   1.  A new patient visit at [facility].   2.  Followup phone call with us in 2 months.   3.  I would not reinstitute anticoagulants in an unsupervised manner.  She thoroughly understands the risks and benefits of both non-warfarin and warfarin oral anticoagulants.   4.  She is to call the Sleep Medicine people to schedule a followup visit at a suitable time after she has changed masks and to assess whether she needs augmented settings to achieve satisfactory sleep hygiene and outcomes.      I would ask [facility] to make a new patient appointment for her.  Were I doing primary care, this is a patient that I would have come back every week for 6-8 weeks to support her in her quest to change her lifestyle and choices about followup.  She understands that I believe that should she not show up at medical appointments that she is eliminating the opportunity to have bariatric surgery.      Were she to get to the required 450-460 pound level, she will need a CT coronary angiogram at minimum in view of her history of multiple poorly treated risk factors.           MD CHERYL Brooks MD

## 2020-09-28 NOTE — LETTER
9/28/2020         RE: Pricilla Brown  [address]  [facility] [address]        Dear Colleague,    Thank you for referring your patient, Pricilla Brown, to the [facility]THOPAEDIC [facility]. Please see a copy of my visit note below.    Past Medical History:   Diagnosis Date     A-fib (H) 2011    on coumadin since 1/13     Asthma     as a kid     Chest pain 2/1/2017     Chronic anticoagulation for a-fib 2/15/2013    INR's followed by coumadin clinic at      Diabetes mellitus (H) 2012     Diastolic heart failure 2/15/2013     Dilated cardiomyopathy (H) 1/8/2013     HTN (hypertension)      Hyperthyroidism     Graves, s/p I131 1/13, now on prednisone and methimazole     Morbid obesity (H)      Pulmonary embolism (H) 1/12    hospitalized in [place], on lovenox/coumadin for a few months but stopped, hypercoag w/u neg per pt     Sleep apnea     using CPAP     Patient Active Problem List   Diagnosis     Other chronic pulmonary heart diseases     Dilated cardiomyopathy (H)     Morbid obesity (H)     Diabetes mellitus without complication (H)     Chronic diastolic heart failure (H)     Chronic anticoagulation for a-fib     JYOTI (obstructive sleep apnea) CPAP Min Pressure of 13 and Max Pressure of 18     Hypothyroidism following radioiodine therapy     SOB (shortness of breath)     Azotemia     Asthma     Peritonsillar abscess     Plantar fasciitis     Neuropathic pain of lower extremity     Atrial fibrillation (H) [I48.91]     Long-term (current) use of anticoagulants [Z79.01]     Cellulitis     Chest pain     Foot pain     Class 3 obesity due to excess calories with serious comorbidity and body mass index (BMI) greater than or equal to 70 in adult     Thyrotoxicosis     Respiratory abnormality     Personal history of tobacco use, presenting hazards to health     Healthcare maintenance     Cocaine abuse (H)     No past surgical history on file.  Social History     Socioeconomic History     Marital status: Single      Spouse name: Not on file     Number of children: Not on file     Years of education: Not on file     Highest education level: Not on file   Occupational History     Not on file   Social Needs     Financial resource strain: Not on file     Food insecurity     Worry: Not on file     Inability: Not on file     Transportation needs     Medical: Not on file     Non-medical: Not on file   Tobacco Use     Smoking status: Light Tobacco Smoker     Packs/day: 0.25     Years: 13.00     Pack years: 3.25     Types: Cigarettes     Smokeless tobacco: Never Used     Tobacco comment: down to 5 cigs   Substance and Sexual Activity     Alcohol use: No     Drug use: No     Sexual activity: Yes     Partners: Male     Birth control/protection: Condom   Lifestyle     Physical activity     Days per week: Not on file     Minutes per session: Not on file     Stress: Not on file   Relationships     Social connections     Talks on phone: Not on file     Gets together: Not on file     Attends [employer] service: Not on file     Active member of club or organization: Not on file     Attends meetings of clubs or organizations: Not on file     Relationship status: Not on file     Intimate partner violence     Fear of current or ex partner: Not on file     Emotionally abused: Not on file     Physically abused: Not on file     Forced sexual activity: Not on file   Other Topics Concern     Parent/sibling w/ CABG, MI or angioplasty before 65F 55M? Not Asked   Social History Narrative    Single, no children        Gyn:        Last pap several years ago, no abnormal    No STIs            Patient is single.  She is no longer working.  She used to work in the area of customer service.  She is currently living with her sister in [place], [place].  She has no pets.  Patient has smoked a half pack of cigarettes a day for the past 10 plus years.  She states that she is down to 5 cigarettes a day with the aid of Wellbutrin.  She does not smoke  cigars, pipes or chew tobacco.  She has 1 cup of coffee in the morning.  She does not drink alcohol.  Patient does not exercise.      Family History   Problem Relation Age of Onset     Thyroid Disease Mother         Grave's D     Diabetes Mother      Heart Disease Mother      Cerebrovascular Disease Mother         dec. 56 yo     Hypertension Mother      Heart Disease Sister         Heart Murmur     Diabetes Sister      Heart Disease Father         dec in his 30s, MI     Psychotic Disorder Brother         Bipolar     Diabetes Brother      Thyroid Disease Brother         Hyper Thyroid     Heart Disease Brother      Thyroid Disease Sister         Hyper Thyroid     Thyroid Disease Sister         Hyper Thyroid     Thyroid Disease Sister         Mental Health Problems     Thyroid Disease Maternal Aunt      Thyroid Disease Maternal Uncle      Cancer No family hx of      Glaucoma No family hx of      Macular Degeneration No family hx of      Lab Results   Component Value Date    A1C >14.0 08/19/2020    A1C 8.4 10/11/2018    A1C 9.6 05/29/2018    A1C 9.0 04/07/2017    A1C 12.1 03/10/2016     Lab Results   Component Value Date    WBC 9.3 08/19/2020     Lab Results   Component Value Date    RBC 5.10 08/19/2020     Lab Results   Component Value Date    HGB 15.6 08/19/2020     Lab Results   Component Value Date    HCT 48.8 08/19/2020     No components found for: MCT  Lab Results   Component Value Date    MCV 96 08/19/2020     Lab Results   Component Value Date    MCH 30.6 08/19/2020     Lab Results   Component Value Date    MCHC 32.0 08/19/2020     Lab Results   Component Value Date    RDW 13.4 08/19/2020     Lab Results   Component Value Date     08/19/2020     Last Comprehensive Metabolic Panel:  Sodium   Date Value Ref Range Status   08/19/2020 128 (L) 133 - 144 mmol/L Final     Potassium   Date Value Ref Range Status   08/19/2020 4.4 3.4 - 5.3 mmol/L Final     Chloride   Date Value Ref Range Status   08/19/2020 95 94  - 109 mmol/L Final     Carbon Dioxide   Date Value Ref Range Status   08/19/2020 26 20 - 32 mmol/L Final     Anion Gap   Date Value Ref Range Status   08/19/2020 6 3 - 14 mmol/L Final     Glucose   Date Value Ref Range Status   08/19/2020 472 (H) 70 - 99 mg/dL Final     Urea Nitrogen   Date Value Ref Range Status   08/19/2020 14 7 - 30 mg/dL Final     Creatinine   Date Value Ref Range Status   08/19/2020 1.00 0.52 - 1.04 mg/dL Final     GFR Estimate   Date Value Ref Range Status   08/19/2020 67 >60 mL/min/[1.73_m2] Final     Comment:     Non  GFR Calc  Starting 12/18/2018, serum creatinine based estimated GFR (eGFR) will be   calculated using the Chronic Kidney Disease Epidemiology Collaboration   (CKD-EPI) equation.       Calcium   Date Value Ref Range Status   08/19/2020 8.7 8.5 - 10.1 mg/dL Final     Lab Results   Component Value Date    AST 13 08/19/2020     Lab Results   Component Value Date    ALT 30 08/19/2020     Lab Results   Component Value Date    BILICONJ 0.0 10/17/2013      Lab Results   Component Value Date    BILITOTAL 0.5 08/19/2020     Lab Results   Component Value Date    ALBUMIN 2.9 08/19/2020     Lab Results   Component Value Date    PROTTOTAL 8.0 08/19/2020      Lab Results   Component Value Date    ALKPHOS 73 08/19/2020     SUBJECTIVE FINDINGS:  A 45-year-old female returns to clinic for diabetic foot check, onychomycosis.  She relates she needs new diabetic shoes; it has been a long time since she has had those.  She relates no ulcers or sores since we have seen her last.  Her nails are long and they hurt; they catch on her shoes and her socks.  She relates she has also been getting some swelling in her legs with some discoloration she is concerned about.  She relates no specific injuries, relieving or aggravating factors. S he relates she does have peripheral numbness and tingling in her feet.  She relates she is not wearing compression socks.  She is not using any lotion on  her feet currently.        OBJECTIVE FINDINGS:  DP and PT are 2/4 bilaterally.  She has some venous stasis with peripheral edema bilaterally and hemosiderin discoloration.  She has dry, scaly skin with hyperkeratotic tissue buildup, mostly on the heels, but in a moccasin pattern on her feet bilaterally.  She has long, incurvated, dystrophic, thickened, brittle nails with subungual debris, dystrophy and discoloration to differing degrees bilaterally, mostly on the hallux nails.  Sharp/dull is intact with 5.07 Wooster-Kenn monofilament bilaterally.  Deep tendon reflexes are intact bilaterally.  She has dorsally contracted digits 2-5 bilaterally.  She has a flexible flat-foot type bilaterally.  She relates she has generalized neuropathy symptoms and pain in her feet.  She has dorsally contracted digits bilaterally.  No gross tendon voids bilaterally.      ASSESSMENT AND PLAN:  Onychauxis and onychomycosis bilaterally.  She is diabetic with peripheral neuropathy.  Her protective sensation is intact.  She has tinea pedis changes bilaterally with callus.  She has venous stasis with peripheral edema bilaterally.  She is diabetic with hammertoes and flat foot present.  Diagnosis and treatment options discussed with the patient.  All the nails were debrided or reduced bilaterally upon consent 1-5.  Prescription for compression socks 15-20 mmHg given and use discussed with her.  I am going to start with the lowest compression, so she can see how easy or hard they are to get on and wear.  Prescription for Loprox cream given and use discussed with her.  Prescription for diabetic shoes and inserts given, and she is given the phone number and address to [facility] to get fitted for those.  She will return to clinic and see me in about 2 months.         Again, thank you for allowing me to participate in the care of your patient.        Sincerely,        Norman Jean DPM

## 2020-09-28 NOTE — NURSING NOTE
Reason For Visit:   Chief Complaint   Patient presents with     RECHECK     Bilateral diabetic footcare        Wt (!) 222.3 kg (490 lb)   BMI 76.74 kg/m           Jesika Sinha, ATC

## 2020-09-29 ENCOUNTER — HOSPITAL ENCOUNTER (OUTPATIENT)
Facility: CLINIC | Age: 45
End: 2020-09-29
Attending: INTERNAL MEDICINE | Admitting: INTERNAL MEDICINE
Payer: MEDICARE

## 2020-09-29 ENCOUNTER — CARE COORDINATION (OUTPATIENT)
Dept: CARDIOLOGY | Facility: CLINIC | Age: 45
End: 2020-09-29

## 2020-09-29 DIAGNOSIS — I51.89 OTHER ILL-DEFINED HEART DISEASES: ICD-10-CM

## 2020-09-29 DIAGNOSIS — Z11.59 ENCOUNTER FOR SCREENING FOR OTHER VIRAL DISEASES: Primary | ICD-10-CM

## 2020-09-29 DIAGNOSIS — I50.23 ACUTE ON CHRONIC SYSTOLIC HEART FAILURE (H): Primary | ICD-10-CM

## 2020-09-29 DIAGNOSIS — I50.23 ACUTE ON CHRONIC SYSTOLIC HEART FAILURE (H): ICD-10-CM

## 2020-09-29 RX ORDER — LIDOCAINE 40 MG/G
CREAM TOPICAL
Status: CANCELLED | OUTPATIENT
Start: 2020-09-29

## 2020-09-29 NOTE — PROGRESS NOTES
Patient had a virtual visit with Dr. Alcala and a right heart cath was ordered. Cath lab called and scheduled for October 16th, arriving at 8:00 AM. Patient was called and message left with above information as well as prep information. Patient encouraged to call back with any questions or concerns. MADS message also sent.

## 2020-09-30 ENCOUNTER — TELEPHONE (OUTPATIENT)
Dept: ENDOCRINOLOGY | Facility: CLINIC | Age: 45
End: 2020-09-30

## 2020-09-30 NOTE — TELEPHONE ENCOUNTER
[facility] Call Center    Phone Message    May a detailed message be left on voicemail: yes     Reason for Call: Medication Question or concern regarding medication   Prescription Clarification  Name of Medication: dulaglutide (TRULICITY) 1.5 MG/0.5ML pen  Prescribing Provider: Cristela   Pharmacy: [place], [place] - [address] [place] [address] [phone]   What on the order needs clarification? Maryana with Medicare is wanting to verify what the correct strength is for the Pt to be taking of this medication. She stated that the pharmacy is fill for both a 1.5 MG and also a 0.75MG. Please review, and call back to clarify which is correct. Her direct number is: [phone]          Action Taken: Message routed to:  [facility] ([facility] endo    Travel Screening: Not Applicable

## 2020-10-05 ENCOUNTER — MYC REFILL (OUTPATIENT)
Dept: ENDOCRINOLOGY | Facility: CLINIC | Age: 45
End: 2020-10-05

## 2020-10-05 ENCOUNTER — DOCUMENTATION ONLY (OUTPATIENT)
Dept: SLEEP MEDICINE | Facility: CLINIC | Age: 45
End: 2020-10-05

## 2020-10-05 ENCOUNTER — MYC REFILL (OUTPATIENT)
Dept: INTERNAL MEDICINE | Facility: CLINIC | Age: 45
End: 2020-10-05

## 2020-10-05 DIAGNOSIS — Z72.0 TOBACCO ABUSE: ICD-10-CM

## 2020-10-05 DIAGNOSIS — E11.9 DIABETES MELLITUS, TYPE 2 (H): ICD-10-CM

## 2020-10-05 DIAGNOSIS — E11.65 TYPE 2 DIABETES MELLITUS WITH HYPERGLYCEMIA, WITH LONG-TERM CURRENT USE OF INSULIN (H): ICD-10-CM

## 2020-10-05 DIAGNOSIS — Z79.4 TYPE 2 DIABETES MELLITUS WITH HYPERGLYCEMIA, WITH LONG-TERM CURRENT USE OF INSULIN (H): ICD-10-CM

## 2020-10-05 RX ORDER — PEN NEEDLE, DIABETIC 32GX 5/32"
NEEDLE, DISPOSABLE MISCELLANEOUS
Qty: 600 EACH | Refills: 3 | Status: SHIPPED | OUTPATIENT
Start: 2020-10-05 | End: 2022-01-01

## 2020-10-05 NOTE — PROGRESS NOTES
LEFT VM RE: SCHEDULED APPT TODAY IN [facility] TO RETURN LOANER AND RECEIVE WARRANTY EXCHANGE.  MACHINE AND DELIVERY TICKETS ARE AT MY DESK.

## 2020-10-06 ENCOUNTER — PRE VISIT (OUTPATIENT)
Dept: OPHTHALMOLOGY | Facility: CLINIC | Age: 45
End: 2020-10-06

## 2020-10-07 RX ORDER — INSULIN ASPART 100 [IU]/ML
INJECTION, SOLUTION INTRAVENOUS; SUBCUTANEOUS
Qty: 250 ML | Refills: 3 | Status: SHIPPED | OUTPATIENT
Start: 2020-10-07 | End: 2021-08-30

## 2020-10-07 RX ORDER — NICOTINE 21 MG/24HR
1 PATCH, TRANSDERMAL 24 HOURS TRANSDERMAL EVERY 24 HOURS
Qty: 90 PATCH | Refills: 0 | OUTPATIENT
Start: 2020-10-07

## 2020-10-08 ENCOUNTER — TELEPHONE (OUTPATIENT)
Dept: FAMILY MEDICINE | Facility: CLINIC | Age: 45
End: 2020-10-08

## 2020-10-08 NOTE — TELEPHONE ENCOUNTER
Attempted x2 to reach patient to schedule establish care appointment with . No answer 1st call and then was able to leave message. Will try again     Alesia Mcmullen  Care Coordinator

## 2020-10-15 DIAGNOSIS — Z11.59 ENCOUNTER FOR SCREENING FOR OTHER VIRAL DISEASES: Primary | ICD-10-CM

## 2020-10-15 NOTE — TELEPHONE ENCOUNTER
"10/15/20 Attempted once again to reach patient, per  request.     \" recommends that patient transfer her Primary Care to [facility]\".  Patient can see \",  or patient may be a good candidate for  who may have better availability\".    Alesia Mcmullen  Care Coordinator      "

## 2020-10-22 DIAGNOSIS — G47.33 OSA (OBSTRUCTIVE SLEEP APNEA): Primary | ICD-10-CM

## 2020-10-23 ENCOUNTER — TELEPHONE (OUTPATIENT)
Dept: ENDOCRINOLOGY | Facility: CLINIC | Age: 45
End: 2020-10-23

## 2020-10-23 NOTE — TELEPHONE ENCOUNTER
CLINIC COORDINATOR SCHEDULING NOTES    CALL RESULT: LVM    APPT TYPE: VIRTUAL VISIT RETURN    PROVIDER: Archibald    DATE APPT NEEDED: 1st available

## 2020-10-23 NOTE — TELEPHONE ENCOUNTER
----- Message from Isela Archibald PA-C sent at 10/23/2020 12:42 PM CDT -----  Please schedule pt appt with me.  Thanks  benny

## 2020-10-27 ENCOUNTER — TELEPHONE (OUTPATIENT)
Dept: ENDOCRINOLOGY | Facility: CLINIC | Age: 45
End: 2020-10-27

## 2020-10-27 DIAGNOSIS — E11.9 DIABETES MELLITUS, TYPE 2 (H): ICD-10-CM

## 2020-10-27 NOTE — TELEPHONE ENCOUNTER
Phone call to Pricilla to trouble-shoot meter problem.  Left message with writers phone number and asked her to call back. Viktoria Paul RN,CDE

## 2020-10-27 NOTE — TELEPHONE ENCOUNTER
[facility] Call [facility]    Phone Message    May a detailed message be left on voicemail: yes     Reason for Call:     Call pt back ASAP states meter is not working.     Action Taken: Message routed to:  [facility] endo    Travel Screening: Not Applicable

## 2020-10-28 ENCOUNTER — TELEPHONE (OUTPATIENT)
Dept: ENDOCRINOLOGY | Facility: CLINIC | Age: 45
End: 2020-10-28

## 2020-10-28 ENCOUNTER — TELEPHONE (OUTPATIENT)
Dept: NURSING | Facility: CLINIC | Age: 45
End: 2020-10-28

## 2020-10-28 DIAGNOSIS — E11.9 TYPE 2 DIABETES MELLITUS (H): Primary | ICD-10-CM

## 2020-10-28 NOTE — TELEPHONE ENCOUNTER
Patient calling  back regarding meter. Malfunctioning and unable to check blood sugars. Denies any symptoms at this time.     Warm transfer to Endocrinology nurse Linn for further assistance.

## 2020-10-30 ENCOUNTER — TELEPHONE (OUTPATIENT)
Dept: CARDIOLOGY | Facility: CLINIC | Age: 45
End: 2020-10-30

## 2020-10-30 NOTE — TELEPHONE ENCOUNTER
"Called pt for screening questions for CCL appt Monday. Pt stated she was busy and to call back.    Called back &Left voicemail for patient to complete Travel Screen for Cardiac Cath Lab appointment on 11/2 and inform patient of updated Visitor Policy.     Informed pt of need for covid test. Per Epic pt had 12:45 appt today for Covid test however it does not appear \"Arrived\" in EPIC    "

## 2020-11-03 ENCOUNTER — TELEPHONE (OUTPATIENT)
Dept: ENDOCRINOLOGY | Facility: CLINIC | Age: 45
End: 2020-11-03

## 2020-11-03 NOTE — TELEPHONE ENCOUNTER
[facility] Call [facility]    Phone Message    May a detailed message be left on voicemail: yes     Reason for Call: Medication Question or concern regarding medication   Prescription Clarification  Name of Medication: blood glucose monitoring (NO BRAND SPECIFIED) meter device kit  Prescribing Provider: Cristela   [id number] - [place], [place] - [address] AT [facility] [address]   What on the order needs clarification? Per Pedro crespo has faxed over paper work for Patients medication for medicare. Please advise.           Action Taken: Message routed to:  [facility] endo    Travel Screening: Not Applicable

## 2020-11-08 ENCOUNTER — HEALTH MAINTENANCE LETTER (OUTPATIENT)
Age: 45
End: 2020-11-08

## 2020-11-18 PROBLEM — E66.813 CLASS 3 OBESITY: Status: ACTIVE | Noted: 2018-07-20

## 2021-01-05 DIAGNOSIS — E03.8 OTHER SPECIFIED HYPOTHYROIDISM: ICD-10-CM

## 2021-01-08 RX ORDER — LEVOTHYROXINE SODIUM 200 UG/1
TABLET ORAL
Qty: 720 TABLET | Refills: 3 | Status: SHIPPED | OUTPATIENT
Start: 2021-01-08 | End: 2021-09-21

## 2021-01-08 NOTE — TELEPHONE ENCOUNTER
LEVOTHYROXINE SODIUM 200MCG TABS  Last Written Prescription Date:  8/28/2020  Last Fill Quantity: 116,   # refills: 1  Last Office Visit : 9/15/2020  Future Office visit:  None  Routing refill request to provider for review/approval because:  Abnormal TSH      Refer to Provider for review      Ashley Haynes RN  Central Triage Red Flags/Med Refills      Recent Labs   Lab Test 08/19/20  1626   TSH 17.34*

## 2021-01-15 ENCOUNTER — TELEPHONE (OUTPATIENT)
Dept: SLEEP MEDICINE | Facility: CLINIC | Age: 46
End: 2021-01-15

## 2021-01-15 ENCOUNTER — HEALTH MAINTENANCE LETTER (OUTPATIENT)
Age: 46
End: 2021-01-15

## 2021-01-15 ENCOUNTER — DOCUMENTATION ONLY (OUTPATIENT)
Dept: ENDOCRINOLOGY | Facility: CLINIC | Age: 46
End: 2021-01-15

## 2021-01-15 NOTE — TELEPHONE ENCOUNTER
CALLED PT AND LVM FOR THE PT TO CALL [facility] TO DISUCSS WHAT SHE IS NEEDING REGARDING HER CPAP MACHINE [phone]

## 2021-01-19 ENCOUNTER — TELEPHONE (OUTPATIENT)
Dept: ENDOCRINOLOGY | Facility: CLINIC | Age: 46
End: 2021-01-19

## 2021-01-19 DIAGNOSIS — E11.9 DIABETES MELLITUS WITHOUT COMPLICATION (H): Primary | ICD-10-CM

## 2021-01-19 DIAGNOSIS — E11.9 TYPE 2 DIABETES MELLITUS (H): ICD-10-CM

## 2021-01-19 RX ORDER — LANCETS
EACH MISCELLANEOUS
Qty: 360 EACH | Refills: 3 | Status: SHIPPED | OUTPATIENT
Start: 2021-01-19

## 2021-01-19 RX ORDER — BLOOD SUGAR DIAGNOSTIC
STRIP MISCELLANEOUS
Qty: 360 STRIP | Refills: 0 | Status: SHIPPED | OUTPATIENT
Start: 2021-01-19 | End: 2021-04-03

## 2021-01-19 NOTE — TELEPHONE ENCOUNTER
[facility] Call [facility]    Phone Message    May a detailed message be left on voicemail: yes     Reason for Call: Other: Pt called stating that it was urgent she speak to Cristela's nurse. Writer unable to speak with nurses. Pt stated that she has been out of her diabetic testing supplies for a few days, and she's concerned because she's been taking her insulin as usual but not able to check her blood sugar. Per chart notes a CMN was faxed to [facility] on 1/14/21, per Sylvia GUZMAN Please review, and call Pt back on 's cell: [phone] ASA to discuss what can be done.     Action Taken: Message routed to:  [facility] endo    Travel Screening: Not Applicable

## 2021-01-19 NOTE — TELEPHONE ENCOUNTER
Call from Pricilla from call center.  Pricilla has been out of test strips x 4 days.   Has continued all of her medications and insulin without knowing what her glucose levels were through yesterday.  Yesterday became scared after experiencing what she is describing as low blood sugar.  Today has not taken any diabetic meds including insulin and now feeling her sugars are high.  She is asking for test strips to be refilled.  Advised Pricilla can assist with this, also advised of need for follow up appointment.  Is agreeable with virtual visit.  Warm transfer to call center and test strips sent to pharmacy

## 2021-01-19 NOTE — TELEPHONE ENCOUNTER
Sylvia did you call the  or check with Idania on the CMN required to get her testing supplies that was sent back? Sarina Haji RN on 1/19/2021 at 2:30 PM

## 2021-01-21 NOTE — PROGRESS NOTES
Pricilla is a 45 year old who is being evaluated via a billable video visit.      How would you like to obtain your AVS? [employer]   E-mail video visit link to: [email or web address]    Will anyone else be joining your video visit? No    Regina Desai MA    Outcome for 01/21/21 2:29 PM: Blood sugars have been between 250-300.

## 2021-01-21 NOTE — PATIENT INSTRUCTIONS
We appreciate your assistance in coordinating your healthcare.     Please upload your insulin pump, blood sugar meter and/or continuous glucose monitor at home 1-2 days before your next diabetes-related appointment.   This will allow your provider to review your  data before your scheduled virtual visit.    To ask a question to your Endocrine care team, please send them a Acquia message, or reach them by phone at [phone]     To expedite your medication refill(s), please contact your pharmacy and have them   fax a refill request to: [phone].  *Please allow 3 business days for routine medication refills.  *Please allow 5 business days for controlled substance medication refills.    For after-hours urgent Endocrine issues, that do not require 541, please dial [phone], and ask to speak with the Endocrinologist On-Call

## 2021-01-22 ENCOUNTER — VIRTUAL VISIT (OUTPATIENT)
Dept: ENDOCRINOLOGY | Facility: CLINIC | Age: 46
End: 2021-01-22
Payer: MEDICARE

## 2021-01-22 DIAGNOSIS — E11.42 TYPE 2 DIABETES MELLITUS WITH DIABETIC POLYNEUROPATHY, WITHOUT LONG-TERM CURRENT USE OF INSULIN (H): Primary | ICD-10-CM

## 2021-01-22 PROCEDURE — 99207 PR NO BILLABLE SERVICE THIS VISIT: CPT | Performed by: PHYSICIAN ASSISTANT

## 2021-01-22 NOTE — LETTER
Date:January 25, 2021      Provider requested that no letter be sent. Do not send.       [facility] Health Information

## 2021-01-22 NOTE — LETTER
1/22/2021       RE: Pricilla Brown  [address]  [place] [address]     Dear Colleague,    Thank you for referring your patient, Pricilla Brown, to the [facility] [place] at [facility]. Please see a copy of my visit note below.    Pricilla is a 45 year old who is being evaluated via a billable video visit.      How would you like to obtain your AVS? [employer]   E-mail video visit link to: [email or web address]    Will anyone else be joining your video visit? No    Regina Desai MA    Outcome for 01/21/21 2:29 PM: Blood sugars have been between 250-300.         NO SHOW for virtual visit today.  No charge.  Isela Archibald PA-C      Again, thank you for allowing me to participate in the care of your patient.      Sincerely,    Isela Archibald PA-C

## 2021-01-31 ENCOUNTER — HEALTH MAINTENANCE LETTER (OUTPATIENT)
Age: 46
End: 2021-01-31

## 2021-02-09 ENCOUNTER — CARE COORDINATION (OUTPATIENT)
Dept: SLEEP MEDICINE | Facility: CLINIC | Age: 46
End: 2021-02-09

## 2021-02-10 NOTE — PROGRESS NOTES
Faxed signed [facility] energy emergency reconnect form to [facility] fax [phone].  Copy of form sent to HIM to be scanned to [facility].

## 2021-02-22 NOTE — LETTER
2/28/2018       RE: Pricilla Brown  [address]   [place] [facility] [address]     Dear Colleague,    Thank you for referring your patient, Pricilla Brown, to the [facility] ENDOCRINOLOGY at [facility]. Please see a copy of my visit note below.    HPI  Pricilla Brown is a 43 year old female with type 2 diabetes mellitus here today for a follow up visit.  Pt's hx is also significant for morbid obesity, dilated cardiomyopathy with diastolic dysfunction, atrial fibrillation, hx of PE, HTN, Grave's disease and sleep apnea.  For her diabetes, pt was to take Trulicity 1.5 mg SQ once a week and discontinue her Victoza.  She tells me she stopped her Victoza, but has not taken Trulicity.  She will be picking up the Trulicity at the pharmacy today.  She remains on Toujeo 170 units SQ each am and Novolog 65 units with meals, plus correction insulin ( 2 units/50 for BG > 150 ).  Her A1C was 8.9 %on 2/7/2018.  Her previous A1C was 8.8 %.  Pt's A1C was 10.7 % in 6/2016 .  Pricilla had an A1C value of 12.1 % on 3/10/2016.  Pt has no glucose meter today.  On ROS today, pt reports fatigue.  Some intermittent blurred vision.  She has SOB and is using her CPAP. Denies cough.  She has numbness and tingling in both feet and hands.  Pt denies vomiting, chest pain,  chest pain or abd pain.  She denies diarrhea, dysuria, hematuria and no foot ulcers.    ROS  Please see under HPI.    Allergies  Allergies   Allergen Reactions     Penicillins Other (See Comments)     CHILDHOOD ALLERGY     Ibuprofen Sodium Hives and Rash       Medications  Current Outpatient Prescriptions   Medication Sig Dispense Refill     insulin glargine U-300 (TOUJEO) 300 UNIT/ML injection Inject 170 units SQ each am. 30 mL 3     NOVOLOG FLEXPEN 100 UNIT/ML soln Inject 65 units with meals plus correction. Pt uses approx 190 units in 24 hrs. 60 mL 3     gabapentin (NEURONTIN) 300 MG capsule Take 2 capsules (600 mg) by mouth 2 times daily  Assessment and Plan    Immunizations Recommended: Due for TD, up to date with pertussis, come back in 3 months due to COVID vaccine given Saturday and also next one due 3/14, April 1st TD  Cancer Screening Recommendations Reviewed: Mammogram, up to date with PAP  Preventative diet & exercise related life-style changes discussed & recommended.    Vitamin d intake reviewed and recommended.    Will obtain labs today and ordered  F/u in office as needed and/or at next preventative visit.    Medical compliance with plan discussed and risks of non-compliance reviewed.    Proper usage and side effects of medications reviewed & discussed.    Take medication as directed.    Patient education completed on disease process, etiology & prognosis.    Return to clinic as clinically indicated as discussed with patient who verbalized understanding of & agreement with the plan.     Natali Mckinnon, APRN       Needs PCP appt for further refills 120 capsule 0     blood glucose (NO BRAND SPECIFIED) lancets standard Use to test blood sugar 5 times daily or as directed. 200 each 11     Blood Glucose Monitoring Suppl (IBG STAR) W/DEVICE KIT -PLEASE GIVE PATIENT A DEVICE HER INSURANCE WILL COVER- 1 kit 0     blood glucose monitoring (NO BRAND SPECIFIED) test strip Use to test blood sugar 3-4 times daily or as directed with insurance covered strips 100 strip 3     levothyroxine (SYNTHROID/LEVOTHROID) 200 MCG tablet Take 1 tab Monday thru Saturday and 2 tabs on Sundays. 102 tablet 3     blood glucose monitoring (NO BRAND SPECIFIED) test strip Use to test blood sugars 3 times daily or as directed 100 strip 11     blood glucose monitoring (ULTRA THIN 30G) lancets Use to test blood sugar 3 times daily  With meter/ strips/ lancets covered by insurance 360 each 3     blood glucose monitoring (NO BRAND SPECIFIED) meter device kit Use to test blood sugar 3 times daily or as directed. 1 kit 1     blood glucose monitoring (NO BRAND SPECIFIED) meter device kit Use to test blood sugar 4 times daily  With meter/ strips/ lancets covered by insurance 1 kit 0     blood glucose monitoring (IGLUCOSE TEST STRIPS) test strip Use to test blood sugar 4 times daily  With meter/ strips lancets covered by insurance 360 strip 3     buPROPion (WELLBUTRIN SR) 100 MG 12 hr tablet Take 1 tablet (100 mg) by mouth 2 times daily 180 tablet 1     blood glucose monitoring (ACCU-CHEK SMARTVIEW) test strip Use to test blood sugar 5 times daily 500 each 3     blood glucose monitoring (ACCU-CHEK FASTCLIX) lancets Use to test blood sugar 5 times daily or as directed. 500 each 3     torsemide (DEMADEX) 100 MG tablet Take 1 tablet (100 mg) by mouth 2 times daily 180 tablet 1     topiramate (TOPAMAX) 25 MG tablet Take 3 tablets (75 mg) by mouth 2 times daily 180 tablet 0     colchicine 0.6 MG tablet Take 1-2 tablets (0.6-1.2 mg) by mouth daily as needed for moderate pain  12 tablet 1     hydrALAZINE (APRESOLINE) 25 MG tablet Take 1 tab (25 mg) in am, 2 tabs (50 mg) midday, and 2 tabs (50 mg) in pm daily 450 tablet 3     warfarin (COUMADIN) 5 MG tablet Take 20 mg (four tabs) M and F and 15 mg (three tabs) all other days of the week or as directed by the Coumadin Clinic. 100 tablet 6     potassium chloride SA (POTASSIUM CHLORIDE) 20 MEQ CR tablet Take 2 tablets (40 mEq) by mouth 4 times daily 240 tablet 3     insulin pen needle (BD RIVER U/F) 32G X 4 MM Use 6 daily or as directed. 550 each 3     diclofenac (VOLTAREN) 1 % GEL topical gel Apply 4 grams to knees or 2 grams to hands four times daily using enclosed dosing card. 100 g 1     clotrimazole (LOTRIMIN) 1 % cream Apply topically 2 times daily 30 g 1     ciclopirox 8 % SOLN Externally apply topically daily To toenails. 1 Bottle 11     Efinaconazole 10 % SOLN Externally apply topically daily To toenails. 8 mL 11     acetaminophen (TYLENOL) 325 MG tablet Take 2 tablets (650 mg) by mouth 3 times daily as needed for mild pain or fever (total acetaminophen dose should not exceed 3000 mg per day) 180 tablet 5     polyethylene glycol (MIRALAX/GLYCOLAX) powder        morphine (MS CONTIN) 15 MG 12 hr tablet Take 15 mg by mouth daily as needed       order for DME Left foot 1 Units 0     order for DME Equipment being ordered: [id number] $92   Plantar, fasciitis, LG, night splint 1 each 0     capsaicin (ZOSTRIX) 0.025 % CREA Apply 1 g topically 3 times daily as needed 1 Tube 0     order for DME Equipment being ordered: Challenger Wide walker if available - patient needs seat, basket and brakes. 1 each 0     nicotine ([employer] NICOTINE) 14 MG/24HR patch 2h hr Place 1 patch onto the skin every 24 hours 30 patch 1     nystatin (MYCOSTATIN) cream Apply topically 2 times daily To toenails 90 g 6     spironolactone (ALDACTONE) 50 MG tablet Take 1 tablet (50 mg) by mouth daily 30 tablet 11     oxyCODONE-acetaminophen (PERCOCET) 5-325 MG per tablet  Take 1 tablet by mouth every 8 hours as needed for moderate to severe pain (try to limit use, no further prescriptions until seen in pain clinic) 60 tablet 0     DULoxetine (CYMBALTA) 20 MG capsule Take 1 capsule (20 mg) by mouth 2 times daily 60 capsule 0     senna (SENOKOT) 8.6 MG tablet Take 1 tablet by mouth 2 times daily 45 tablet 0     bisacodyl (DULCOLAX) 10 MG suppository Place 1 suppository (10 mg) rectally daily as needed for constipation 6 suppository 0     Blood Glucose Monitoring Suppl (ACCU-CHEK RON PLUS) W/DEVICE KIT Use to test blood sugars 2 times daily or as directed. 1 kit 0     tiotropium (SPIRIVA HANDIHALER) 18 MCG inhalation capsule Inhale contents of one capsule daily. 30 capsule 1     Respiratory Therapy Supplies (NEBULIZER COMPRESSOR) KIT 1 Device 4 times daily as needed. 1 kit 3     ORDER FOR DME Use your CPAP device as directed by your provider. Pressure change to min 13 max 18cwp 1 each 99     albuterol (PROAIR HFA, PROVENTIL HFA, VENTOLIN HFA) 108 (90 BASE) MCG/ACT inhaler Inhale 2 puffs into the lungs every 6 hours as needed. 1 Inhaler 11     dulaglutide (TRULICITY) 1.5 MG/0.5ML pen Inject 1.5 mg Subcutaneous every 7 days (Patient not taking: Reported on 2/28/2018) 45 mL 3     metoprolol (TOPROL-XL) 50 MG 24 hr tablet Take 0.5 tablets (25 mg) by mouth At Bedtime (Patient not taking: Reported on 2/28/2018) 90 tablet 3       Family History  family history includes CEREBROVASCULAR DISEASE in her mother; DIABETES in her brother, mother, and sister; HEART DISEASE in her brother, father, mother, and sister; Hypertension in her mother; Psychotic Disorder in her brother; Thyroid Disease in her brother, maternal aunt, maternal uncle, mother, sister, sister, and sister. There is no history of CANCER, Glaucoma, or Macular Degeneration.    Social History  Smoke: yes.  ETOH: rare.      Past Medical History  Past Medical History:   Diagnosis Date     A-fib (H) 2011    on coumadin since 1/13      Asthma     as a kid     Chest pain 2/1/2017     Chronic anticoagulation for a-fib 2/15/2013    INR's followed by coumadin clinic [facility]     Diabetes mellitus (H) 2012     Diastolic heart failure 2/15/2013     Dilated cardiomyopathy (H) 1/8/2013     HTN (hypertension)      Hyperthyroidism     Graves, s/p I131 1/13, now on prednisone and methimazole     Morbid obesity (H)      Pulmonary embolism (H) 1/12    hospitalized in [place], on lovenox/coumadin for a few months but stopped, hypercoag w/u neg per pt     Sleep apnea     using CPAP         Physical Exam  /83 (BP Location: Right arm, Patient Position: Sitting, Cuff Size: Adult Large)  Pulse 93  Wt (!) 209.6 kg (462 lb)  BMI 72.36 kg/m2  Body mass index is 72.36 kg/(m^2).      RESULTS  Creatinine   Date Value Ref Range Status   02/07/2018 1.19 (H) 0.52 - 1.04 mg/dL Final     GFR Estimate   Date Value Ref Range Status   02/07/2018 49 (L) >60 mL/min/1.7m2 Final     Comment:     Non  GFR Calc     Hemoglobin A1C   Date Value Ref Range Status   04/07/2017 9.0 (H) 4.3 - 6.0 % Final     Potassium   Date Value Ref Range Status   02/07/2018 3.4 3.4 - 5.3 mmol/L Final     ALT   Date Value Ref Range Status   02/07/2018 18 0 - 50 U/L Final     AST   Date Value Ref Range Status   02/07/2018 9 0 - 45 U/L Final     TSH   Date Value Ref Range Status   02/07/2018 7.15 (H) 0.40 - 4.00 mU/L Final     T4 Free   Date Value Ref Range Status   02/07/2018 0.91 0.76 - 1.46 ng/dL Final       Cholesterol   Date Value Ref Range Status   10/26/2016 135 <200 mg/dL Final   07/14/2015 164 <200 mg/dL Final     Comment:     LDL Cholesterol is the primary guide to therapy.   The NCEP recommends further evaluation of: patients with cholesterol greater   than 200 mg/dL if additional risk factors are present, cholesterol greater   than   240 mg/dL, triglycerides greater than 150 mg/dL, or HDL less than 40 mg/dL.       HDL Cholesterol   Date Value Ref Range Status   10/26/2016 37 (L)  >49 mg/dL Final   07/14/2015 49 (L) >50 mg/dL Final     LDL Cholesterol Calculated   Date Value Ref Range Status   10/26/2016 68 <100 mg/dL Final     Comment:     Desirable:       <100 mg/dl   07/14/2015 90 0 - 129 mg/dL Final     Comment:     LDL Cholesterol is the primary guide to therapy: LDL-cholesterol goal in high   risk patients is <100 mg/dL and in very high risk patients is <70 mg/dL.       Triglycerides   Date Value Ref Range Status   10/26/2016 151 (H) <150 mg/dL Final     Comment:     Borderline high:  150-199 mg/dl   High:             200-499 mg/dl   Very high:       >499 mg/dl   Non Fasting     07/14/2015 123 0 - 150 mg/dL Final     Cholesterol/HDL Ratio   Date Value Ref Range Status   07/14/2015 3.4 0.0 - 5.0 Final   01/06/2013 3.6 0.0 - 5.0 Final     A1C  8.9    2/7/2018  A1C  8.8     2/7/2017  A1C  9.0    11/10/2016  A1C 10.7   6/16/2016  A1C 12.1   3/10/2016    ASSESSMENT/PLAN:    1.  TYPE 2 DIABETES MELLITUS: Uncontrolled type 2 diabetes mellitus.  Pricilla will  her Trulicity 1.5 mg SQ once a week today at the pharmacy following this clinic visit.  She is to remain on Toujeo 170 units SQ each am and  Novolog 65 units with meals with correction insulin ( 2 unit/50 for BG > 150 ).  She does not tolerate Metformin due to GI distress.  Her creat is 0.96  with GFR 63 mL/min in Jan 2017.  She is to check her blood sugar fasting each am and check her blood sugar prelunch and predinner daily.  She was seen by O[facility] in May 2016 without retinopathy. Pricilla has an appointment with Oph in a few weeks.  Pt received the flu vaccine this season.    2.  HTN: Continue current RXs.    3.  CARDIOMYOPATHY/A FIB:  Pt followed here by Cardiology staff.      4. GRAVE'S DISEASE:  Hx of Grave's disease s/p I 131 in Jan 2013.    Pt became hypothyroid following above treatment and is taking Levothyroxine 200 mcg po daily.  Pt's TSH is 7.15 mU/L in Jan 2018.  Pt instructed to increase her Levothyroxine 200 mcg 1 pill  Monday - Saturday and 2 pills on Sundays.  Will recheck her TSH and FT4 when I see her in 8 weeks.    5.  OBESITY:  Morbid obesity with BMI > 70 kg/m2.  She has [facility] by [facility] staff.    6.  Return to Endocrine Clinic in 8 weeks.   Check A1C and also recheck TSH/FT4.    Again, thank you for allowing me to participate in the care of your patient.      Sincerely,    Isela Archibald PA-C

## 2021-02-25 DIAGNOSIS — E11.65 TYPE 2 DIABETES MELLITUS WITH HYPERGLYCEMIA, WITH LONG-TERM CURRENT USE OF INSULIN (H): ICD-10-CM

## 2021-02-25 DIAGNOSIS — Z79.4 TYPE 2 DIABETES MELLITUS WITH HYPERGLYCEMIA, WITH LONG-TERM CURRENT USE OF INSULIN (H): ICD-10-CM

## 2021-02-28 RX ORDER — DULAGLUTIDE 1.5 MG/.5ML
INJECTION, SOLUTION SUBCUTANEOUS
Refills: 3 | OUTPATIENT
Start: 2021-02-28

## 2021-02-28 NOTE — TELEPHONE ENCOUNTER
dulaglutide (TRULICITY) 1.5 MG/0.5ML pen     Last Written Prescription Date:  8/11/20  Last Fill Quantity: 6ml,   # refills: 3  Last Office Visit : 1/22/21  Future Office visit: none

## 2021-03-22 ENCOUNTER — TELEPHONE (OUTPATIENT)
Dept: ENDOCRINOLOGY | Facility: CLINIC | Age: 46
End: 2021-03-22

## 2021-03-22 ENCOUNTER — OFFICE VISIT - HEALTHEAST (OUTPATIENT)
Dept: CARDIOLOGY | Facility: CLINIC | Age: 46
End: 2021-03-22

## 2021-03-22 ENCOUNTER — AMBULATORY - HEALTHEAST (OUTPATIENT)
Dept: CARDIOLOGY | Facility: CLINIC | Age: 46
End: 2021-03-22

## 2021-03-22 DIAGNOSIS — E11.42 TYPE 2 DIABETES MELLITUS WITH DIABETIC POLYNEUROPATHY, WITHOUT LONG-TERM CURRENT USE OF INSULIN (H): ICD-10-CM

## 2021-03-22 DIAGNOSIS — I48.91 ATRIAL FIBRILLATION, UNSPECIFIED TYPE (H): ICD-10-CM

## 2021-03-22 DIAGNOSIS — Z00.6 EXAMINATION OF PARTICIPANT OR CONTROL IN CLINICAL RESEARCH: ICD-10-CM

## 2021-03-22 RX ORDER — INSULIN GLARGINE 300 U/ML
INJECTION, SOLUTION SUBCUTANEOUS
Qty: 230 ML | Refills: 2 | Status: SHIPPED | OUTPATIENT
Start: 2021-03-22 | End: 2022-01-01

## 2021-03-22 NOTE — TELEPHONE ENCOUNTER
[facility] Call Center    Phone Message    May a detailed message be left on voicemail: yes     Reason for Call: Medication Refill Request    Has the patient contacted the pharmacy for the refill? Yes   Name of medication being requested: TOUJEO SOLOSTAR 300 UNIT/ML (1 units dial) pen [Pharmacy Med Name: TOUJEO *300 UNITS/ML* SOLOSTAR]    Provider who prescribed the medication: Cristela     Pharmacy: [phone]    Date medication is needed: ASAP per pt. Out of insulin.        Action Taken: Message routed to:  [facility]o    Travel Screening: Not Applicable

## 2021-03-22 NOTE — TELEPHONE ENCOUNTER
Centralized Medication Refill Team note:   insulin glargine U-300 (TOUJEO SOLOSTAR) 300 UNIT/ML (1 units dial) pen  INJECT 225 UNITS UNDER THE SKIN EVERY MORNING  Last Written Prescription Date:  3/22/2021  Last Fill Quantity: 230 ml ,   # refills: 2  Last Office Visit : 1/22/2021  Future Office visit:  None  [facility] [place], [place] - [address] [place] [address] [phone]   Addressed in a different encounter 3/22/2021  Haja sent

## 2021-03-28 ENCOUNTER — HEALTH MAINTENANCE LETTER (OUTPATIENT)
Age: 46
End: 2021-03-28

## 2021-04-01 DIAGNOSIS — E11.9 DIABETES MELLITUS WITHOUT COMPLICATION (H): Primary | ICD-10-CM

## 2021-04-01 DIAGNOSIS — E11.9 TYPE 2 DIABETES MELLITUS (H): ICD-10-CM

## 2021-04-01 NOTE — TELEPHONE ENCOUNTER
[facility] Call [facility]    Phone Message    May a detailed message be left on voicemail: yes     Reason for Call: Medication Refill Request    Has the patient contacted the pharmacy for the refill? Yes   Name of medication being requested: blood glucose (NO BRAND SPECIFIED) test strip  Provider who prescribed the medication: Isela Archibald  Pharmacy:    [place], [place] - [address] [place] [address] [phone]  Date medication is needed: ASAP - Patient is out.          Action Taken: Message routed to:  [facility] Endo    Travel Screening: Not Applicable

## 2021-04-03 RX ORDER — BLOOD SUGAR DIAGNOSTIC
STRIP MISCELLANEOUS
Qty: 360 STRIP | Refills: 2 | Status: SHIPPED | OUTPATIENT
Start: 2021-04-03 | End: 2021-07-14

## 2021-04-03 NOTE — TELEPHONE ENCOUNTER
1/22/2021  [facility] [place]   Isela Archibald PA-C  Endocrinology, Diabetes, and Metabolism     Test strips

## 2021-04-09 ENCOUNTER — DOCUMENTATION ONLY (OUTPATIENT)
Dept: SLEEP MEDICINE | Facility: CLINIC | Age: 46
End: 2021-04-09

## 2021-04-09 NOTE — PROGRESS NOTES
PT WAS A NO CALL NO SHOW IN [facility] FOR SCHEDULED 10AM APPOINTMENT TO RETURN LOANER AND RECEIVE WARRANTY EXCHANGE.  I CALLED AND LEFT  TO RETURN CALL [phone] TO RESCHEDULE.   MACHINE AND DELIVERY TICKET IS AT [facility].

## 2021-04-13 ENCOUNTER — DOCUMENTATION ONLY (OUTPATIENT)
Dept: SLEEP MEDICINE | Facility: CLINIC | Age: 46
End: 2021-04-13

## 2021-04-13 NOTE — PROGRESS NOTES
4/13/2021- [facility]- DELIVERY TICKET  FOR WARRANTY EXCHANGE DELETED ( SALES ORDER [id number]) PT WAS A NO SHOW NO CALL FOR SCHEDULE APPOINTMENT ON 4/9/2021 IN [facility]  CPAP MACHINE WAS WAITING AT  FOR PT PICK-UP.  CPAP MACHINE GIVEN TO MEGAN SINGH TO RE-BAG.

## 2021-04-14 NOTE — PROGRESS NOTES
Pricilla is a 46 year old who is being evaluated via a billable video visit.      How would you like to obtain your AVS? MyChart    Will anyone else be joining your video visit? No    Regina Desai MA    Outcome for 04/14/21 3:58 PM: Blood sugars have been between 200-300.      NO SHOW FOR VIRTUAL VISIT TODAY.  NO CHARGE.  Isela Archibald PA-C

## 2021-04-14 NOTE — PATIENT INSTRUCTIONS
We appreciate your assistance in coordinating your healthcare.     Please upload your insulin pump, blood sugar meter and/or continuous glucose monitor at home 1-2 days before your next diabetes-related appointment.   This will allow your provider to review your  data before your scheduled virtual visit.    To ask a question to your Endocrine care team, please send them a [employer]ide[employer] message, or reach them by phone at [phone]     To expedite your medication refill(s), please contact your pharmacy and have them   fax a refill request to: [phone].  *Please allow 3 business days for routine medication refills.  *Please allow 5 business days for controlled substance medication refills.    For after-hours urgent Endocrine issues, that do not require 621, please dial [phone], and ask to speak with the Endocrinologist On-Call

## 2021-04-15 ENCOUNTER — VIRTUAL VISIT (OUTPATIENT)
Dept: ENDOCRINOLOGY | Facility: CLINIC | Age: 46
End: 2021-04-15
Payer: MEDICARE

## 2021-04-15 DIAGNOSIS — E11.65 TYPE 2 DIABETES MELLITUS WITH HYPERGLYCEMIA, WITH LONG-TERM CURRENT USE OF INSULIN (H): Primary | ICD-10-CM

## 2021-04-15 DIAGNOSIS — Z79.4 TYPE 2 DIABETES MELLITUS WITH HYPERGLYCEMIA, WITH LONG-TERM CURRENT USE OF INSULIN (H): Primary | ICD-10-CM

## 2021-04-15 PROCEDURE — 99207 PR NO BILLABLE SERVICE THIS VISIT: CPT | Performed by: PHYSICIAN ASSISTANT

## 2021-04-15 NOTE — LETTER
4/15/2021       RE: Pricilla Brown  [address]  [facility] [address]     Dear Colleague,    Thank you for referring your patient, Pricilla Brown, to the [facility] ENDOCRINOLOGY [facility]INIC [place] at [facility]. Please see a copy of my visit note below.    Priiclla is a 46 year old who is being evaluated via a billable video visit.      How would you like to obtain your AVS? MyChart    Will anyone else be joining your video visit? No    Regina Desai MA    Outcome for 04/14/21 3:58 PM: Blood sugars have been between 200-300.      NO SHOW FOR VIRTUAL VISIT TODAY.  NO CHARGE.  Isela Archibald PA-C          Again, thank you for allowing me to participate in the care of your patient.      Sincerely,    Isela Archibald PA-C

## 2021-04-15 NOTE — LETTER
Date:April 17, 2021      Provider requested that no letter be sent. Do not send.       [facility]

## 2021-04-26 NOTE — PATIENT INSTRUCTIONS
We appreciate your assistance in coordinating your healthcare.     Please upload your insulin pump, blood sugar meter and/or continuous glucose monitor at home 1-2 days before your next diabetes-related appointment.   This will allow your provider to review your  data before your scheduled virtual visit.    To ask a question to your Endocrine care team, please send them a [employer] message, or reach them by phone at [phone]     To expedite your medication refill(s), please contact your pharmacy and have them   fax a refill request to: [phone].  *Please allow 3 business days for routine medication refills.  *Please allow 5 business days for controlled substance medication refills.    For after-hours urgent Endocrine issues, that do not require 711, please dial [phone], and ask to speak with the Endocrinologist On-Call

## 2021-04-26 NOTE — PROGRESS NOTES
Pricilla is a 46 year old who is being evaluated via a billable video visit.      How would you like to obtain your AVS?       Regina LUTZ MA    Outcome for 04/26/21 11:24 AM :Left Voicemail for patient to call back    Outcome for 04/26/21 2:24 PM :Left Voicemail for patient to call back    NO SHOW FOR VIRTUAL VISIT TODAY.  NO CHARGE TODAY.  ELIO MAXWELL PA-C

## 2021-04-27 ENCOUNTER — VIRTUAL VISIT (OUTPATIENT)
Dept: ENDOCRINOLOGY | Facility: CLINIC | Age: 46
End: 2021-04-27
Payer: MEDICARE

## 2021-04-27 DIAGNOSIS — E11.65 TYPE 2 DIABETES MELLITUS WITH HYPERGLYCEMIA, WITH LONG-TERM CURRENT USE OF INSULIN (H): Primary | ICD-10-CM

## 2021-04-27 DIAGNOSIS — Z79.4 TYPE 2 DIABETES MELLITUS WITH HYPERGLYCEMIA, WITH LONG-TERM CURRENT USE OF INSULIN (H): Primary | ICD-10-CM

## 2021-04-27 PROCEDURE — 99207 PR NO BILLABLE SERVICE THIS VISIT: CPT | Performed by: PHYSICIAN ASSISTANT

## 2021-04-27 NOTE — LETTER
4/27/2021       RE: Pricilla Brown  [address]  [facility] [address]     Dear Colleague,    Thank you for referring your patient, Pricilla Brown, to the [facility] ENDOCRINOLOGY CLINIC [place] at [facility]. Please see a copy of my visit note below.    Pricilla is a 46 year old who is being evaluated via a billable video visit.      How would you like to obtain your AVS?       Regina LUTZ MA    Outcome for 04/26/21 11:24 AM :Left Voicemail for patient to call back    Outcome for 04/26/21 2:24 PM :Left Voicemail for patient to call back    NO SHOW FOR VIRTUAL VISIT TODAY.  NO CHARGE TODAY.  ELIO ARCHIBALD PA-C      Again, thank you for allowing me to participate in the care of your patient.      Sincerely,    Elio Archibald PA-C

## 2021-04-27 NOTE — LETTER
Date:April 29, 2021      Provider requested that no letter be sent. Do not send.       [facility]

## 2021-05-05 DIAGNOSIS — Z79.4 TYPE 2 DIABETES MELLITUS WITH HYPERGLYCEMIA, WITH LONG-TERM CURRENT USE OF INSULIN (H): ICD-10-CM

## 2021-05-05 DIAGNOSIS — E11.65 TYPE 2 DIABETES MELLITUS WITH HYPERGLYCEMIA, WITH LONG-TERM CURRENT USE OF INSULIN (H): ICD-10-CM

## 2021-05-07 RX ORDER — GABAPENTIN 300 MG/1
CAPSULE ORAL
Qty: 360 CAPSULE | Refills: 1 | Status: SHIPPED | OUTPATIENT
Start: 2021-05-07 | End: 2022-02-01

## 2021-05-07 NOTE — TELEPHONE ENCOUNTER
gabapentin (NEURONTIN) 300 MG  Last Written Prescription Date:  8/11/20  Last Fill Quantity: 360 # refills: 1  Last Office Visit : 4/27/21  Future Office visit:  NONE  Routing refill request to provider for review/approval because:  Drug not on the refill protocol / controlled substance

## 2021-05-23 ENCOUNTER — HEALTH MAINTENANCE LETTER (OUTPATIENT)
Age: 46
End: 2021-05-23

## 2021-06-03 ENCOUNTER — TELEPHONE (OUTPATIENT)
Dept: PODIATRY | Facility: CLINIC | Age: 46
End: 2021-06-03

## 2021-06-03 ENCOUNTER — ANCILLARY PROCEDURE (OUTPATIENT)
Dept: GENERAL RADIOLOGY | Facility: CLINIC | Age: 46
End: 2021-06-03
Attending: PODIATRIST
Payer: MEDICARE

## 2021-06-03 ENCOUNTER — OFFICE VISIT (OUTPATIENT)
Dept: PODIATRY | Facility: CLINIC | Age: 46
End: 2021-06-03
Payer: MEDICARE

## 2021-06-03 ENCOUNTER — ANCILLARY PROCEDURE (OUTPATIENT)
Dept: ULTRASOUND IMAGING | Facility: CLINIC | Age: 46
End: 2021-06-03
Attending: PODIATRIST
Payer: MEDICARE

## 2021-06-03 ENCOUNTER — TELEPHONE (OUTPATIENT)
Dept: VASCULAR SURGERY | Facility: CLINIC | Age: 46
End: 2021-06-03

## 2021-06-03 VITALS — DIASTOLIC BLOOD PRESSURE: 88 MMHG | SYSTOLIC BLOOD PRESSURE: 149 MMHG | OXYGEN SATURATION: 98 % | HEART RATE: 95 BPM

## 2021-06-03 DIAGNOSIS — M79.671 RIGHT FOOT PAIN: Primary | ICD-10-CM

## 2021-06-03 DIAGNOSIS — I87.8 VENOUS STASIS: ICD-10-CM

## 2021-06-03 DIAGNOSIS — M79.604 RIGHT LEG PAIN: ICD-10-CM

## 2021-06-03 DIAGNOSIS — E11.51 DIABETES MELLITUS WITH PERIPHERAL VASCULAR DISEASE (H): ICD-10-CM

## 2021-06-03 DIAGNOSIS — E11.49 TYPE II OR UNSPECIFIED TYPE DIABETES MELLITUS WITH NEUROLOGICAL MANIFESTATIONS, NOT STATED AS UNCONTROLLED(250.60) (H): ICD-10-CM

## 2021-06-03 DIAGNOSIS — R60.0 LEG EDEMA, RIGHT: ICD-10-CM

## 2021-06-03 DIAGNOSIS — M10.071 ACUTE IDIOPATHIC GOUT OF RIGHT FOOT: ICD-10-CM

## 2021-06-03 LAB
ANION GAP SERPL CALCULATED.3IONS-SCNC: 7 MMOL/L (ref 3–14)
BASOPHILS # BLD AUTO: 0.1 10E9/L (ref 0–0.2)
BASOPHILS NFR BLD AUTO: 0.7 %
BUN SERPL-MCNC: 13 MG/DL (ref 7–30)
CALCIUM SERPL-MCNC: 8.7 MG/DL (ref 8.5–10.1)
CHLORIDE SERPL-SCNC: 98 MMOL/L (ref 94–109)
CO2 SERPL-SCNC: 25 MMOL/L (ref 20–32)
CREAT SERPL-MCNC: 0.89 MG/DL (ref 0.52–1.04)
CRP SERPL-MCNC: 30 MG/L (ref 0–8)
DIFFERENTIAL METHOD BLD: ABNORMAL
EOSINOPHIL # BLD AUTO: 0.1 10E9/L (ref 0–0.7)
EOSINOPHIL NFR BLD AUTO: 1.1 %
ERYTHROCYTE [DISTWIDTH] IN BLOOD BY AUTOMATED COUNT: 13.3 % (ref 10–15)
ERYTHROCYTE [SEDIMENTATION RATE] IN BLOOD BY WESTERGREN METHOD: 8 MM/H (ref 0–20)
GFR SERPL CREATININE-BSD FRML MDRD: 77 ML/MIN/{1.73_M2}
GLUCOSE SERPL-MCNC: 569 MG/DL (ref 70–99)
HCT VFR BLD AUTO: 50.9 % (ref 35–47)
HGB BLD-MCNC: 16.7 G/DL (ref 11.7–15.7)
IMM GRANULOCYTES # BLD: 0.1 10E9/L (ref 0–0.4)
IMM GRANULOCYTES NFR BLD: 0.6 %
LYMPHOCYTES # BLD AUTO: 1.5 10E9/L (ref 0.8–5.3)
LYMPHOCYTES NFR BLD AUTO: 17.5 %
MCH RBC QN AUTO: 30.1 PG (ref 26.5–33)
MCHC RBC AUTO-ENTMCNC: 32.8 G/DL (ref 31.5–36.5)
MCV RBC AUTO: 92 FL (ref 78–100)
MONOCYTES # BLD AUTO: 0.7 10E9/L (ref 0–1.3)
MONOCYTES NFR BLD AUTO: 8.7 %
NEUTROPHILS # BLD AUTO: 6.1 10E9/L (ref 1.6–8.3)
NEUTROPHILS NFR BLD AUTO: 71.4 %
PLATELET # BLD AUTO: 238 10E9/L (ref 150–450)
POTASSIUM SERPL-SCNC: 4.2 MMOL/L (ref 3.4–5.3)
RBC # BLD AUTO: 5.54 10E12/L (ref 3.8–5.2)
SODIUM SERPL-SCNC: 130 MMOL/L (ref 133–144)
URATE SERPL-MCNC: 6.8 MG/DL (ref 2.6–6)
WBC # BLD AUTO: 8.5 10E9/L (ref 4–11)

## 2021-06-03 PROCEDURE — 93971 EXTREMITY STUDY: CPT | Mod: RT | Performed by: RADIOLOGY

## 2021-06-03 PROCEDURE — 86140 C-REACTIVE PROTEIN: CPT | Performed by: PODIATRIST

## 2021-06-03 PROCEDURE — 80048 BASIC METABOLIC PNL TOTAL CA: CPT | Performed by: PODIATRIST

## 2021-06-03 PROCEDURE — 85025 COMPLETE CBC W/AUTO DIFF WBC: CPT | Performed by: PODIATRIST

## 2021-06-03 PROCEDURE — 36415 COLL VENOUS BLD VENIPUNCTURE: CPT | Performed by: PODIATRIST

## 2021-06-03 PROCEDURE — 99215 OFFICE O/P EST HI 40 MIN: CPT | Performed by: PODIATRIST

## 2021-06-03 PROCEDURE — 85652 RBC SED RATE AUTOMATED: CPT | Performed by: PODIATRIST

## 2021-06-03 PROCEDURE — 73630 X-RAY EXAM OF FOOT: CPT | Mod: RT | Performed by: RADIOLOGY

## 2021-06-03 PROCEDURE — 84550 ASSAY OF BLOOD/URIC ACID: CPT | Performed by: PODIATRIST

## 2021-06-03 RX ORDER — CLINDAMYCIN HCL 300 MG
300 CAPSULE ORAL 2 TIMES DAILY
Qty: 20 CAPSULE | Refills: 0 | Status: SHIPPED | OUTPATIENT
Start: 2021-06-03 | End: 2021-09-09

## 2021-06-03 RX ORDER — CIPROFLOXACIN 500 MG/1
500 TABLET, FILM COATED ORAL 2 TIMES DAILY
Qty: 28 TABLET | Refills: 0 | Status: SHIPPED | OUTPATIENT
Start: 2021-06-03 | End: 2022-01-01

## 2021-06-03 RX ORDER — COLCHICINE 0.6 MG/1
0.6 TABLET ORAL 2 TIMES DAILY
Qty: 12 TABLET | Refills: 0 | Status: ON HOLD | OUTPATIENT
Start: 2021-06-03 | End: 2023-01-01

## 2021-06-03 NOTE — TELEPHONE ENCOUNTER
6/3 Provided phone number [phone] to schedule schedule several weekly appointments with Dr. Jean.     Sol gabriel Procedure   Orthopedics, Podiatry, Sports Medicine, ENT/Eye Specialties  [facility]   [phone]

## 2021-06-03 NOTE — PROGRESS NOTES
Past Medical History:   Diagnosis Date     A-fib (H) 2011    on coumadin since 1/13     Asthma     as a kid     Chest pain 2/1/2017     Chronic anticoagulation for a-fib 2/15/2013    INR's followed by coumadin clinic at      Diabetes mellitus (H) 2012     Diastolic heart failure 2/15/2013     Dilated cardiomyopathy (H) 1/8/2013     HTN (hypertension)      Hyperthyroidism     Graves, s/p I131 1/13, now on prednisone and methimazole     Morbid obesity (H)      Pulmonary embolism (H) 1/12    hospitalized in [place], on lovenox/coumadin for a few months but stopped, hypercoag w/u neg per pt     Sleep apnea     using CPAP     Patient Active Problem List   Diagnosis     Other chronic pulmonary heart diseases     Dilated cardiomyopathy (H)     Morbid obesity (H)     Diabetes mellitus without complication (H)     Chronic diastolic heart failure (H)     Chronic anticoagulation for a-fib     JYOTI (obstructive sleep apnea) CPAP Min Pressure of 13 and Max Pressure of 18     Hypothyroidism following radioiodine therapy     SOB (shortness of breath)     Azotemia     Asthma     Peritonsillar abscess     Plantar fasciitis     Neuropathic pain of lower extremity     Atrial fibrillation (H) [I48.91]     Long-term (current) use of anticoagulants [Z79.01]     Cellulitis     Chest pain     Foot pain     Class 3 obesity     Thyrotoxicosis     Respiratory abnormality     Personal history of tobacco use, presenting hazards to health     Healthcare maintenance     Cocaine abuse (H)     Acute on chronic systolic heart failure (H)     Other ill-defined heart diseases     No past surgical history on file.  Social History     Socioeconomic History     Marital status: Single     Spouse name: Not on file     Number of children: Not on file     Years of education: Not on file     Highest education level: Not on file   Occupational History     Not on file   Social Needs     Financial resource strain: Not on file     Food insecurity     Worry: Not on  file     Inability: Not on file     Transportation needs     Medical: Not on file     Non-medical: Not on file   Tobacco Use     Smoking status: Light Tobacco Smoker     Packs/day: 0.25     Years: 13.00     Pack years: 3.25     Types: Cigarettes     Smokeless tobacco: Never Used     Tobacco comment: down to 5 cigs   Substance and Sexual Activity     Alcohol use: No     Drug use: No     Sexual activity: Yes     Partners: Male     Birth control/protection: Condom   Lifestyle     Physical activity     Days per week: Not on file     Minutes per session: Not on file     Stress: Not on file   Relationships     Social connections     Talks on phone: Not on file     Gets together: Not on file     Attends Confucianism service: Not on file     Active member of club or organization: Not on file     Attends meetings of clubs or organizations: Not on file     Relationship status: Not on file     Intimate partner violence     Fear of current or ex partner: Not on file     Emotionally abused: Not on file     Physically abused: Not on file     Forced sexual activity: Not on file   Other Topics Concern     Parent/sibling w/ CABG, MI or angioplasty before 65F 55M? Not Asked   Social History Narrative    Single, no children        Gyn:        Last pap several years ago, no abnormal    No STIs            Patient is single.  She is no longer working.  She used to work in the area of customer service.  She is currently living with her sister in [place], [place].  She has no pets.  Patient has smoked a half pack of cigarettes a day for the past 10 plus years.  She states that she is down to 5 cigarettes a day with the aid of Wellbutrin.  She does not smoke cigars, pipes or chew tobacco.  She has 1 cup of coffee in the morning.  She does not drink alcohol.  Patient does not exercise.      Family History   Problem Relation Age of Onset     Thyroid Disease Mother         Grave's D     Diabetes Mother      Heart Disease Mother       Cerebrovascular Disease Mother         dec. 54 yo     Hypertension Mother      Heart Disease Sister         Heart Murmur     Diabetes Sister      Heart Disease Father         dec in his 30s, MI     Psychotic Disorder Brother         Bipolar     Diabetes Brother      Thyroid Disease Brother         Hyper Thyroid     Heart Disease Brother      Thyroid Disease Sister         Hyper Thyroid     Thyroid Disease Sister         Hyper Thyroid     Thyroid Disease Sister         Mental Health Problems     Thyroid Disease Maternal Aunt      Thyroid Disease Maternal Uncle      Cancer No family hx of      Glaucoma No family hx of      Macular Degeneration No family hx of      Lab Results   Component Value Date    A1C >14.0 08/19/2020    A1C 8.4 10/11/2018    A1C 9.6 05/29/2018    A1C 9.0 04/07/2017    A1C 12.1 03/10/2016     Lab Results   Component Value Date    WBC 8.5 06/03/2021     Lab Results   Component Value Date    RBC 5.54 06/03/2021     Lab Results   Component Value Date    HGB 16.7 06/03/2021     Lab Results   Component Value Date    HCT 50.9 06/03/2021     No components found for: MCT  Lab Results   Component Value Date    MCV 92 06/03/2021     Lab Results   Component Value Date    MCH 30.1 06/03/2021     Lab Results   Component Value Date    MCHC 32.8 06/03/2021     Lab Results   Component Value Date    RDW 13.3 06/03/2021     Lab Results   Component Value Date     06/03/2021     Last Comprehensive Metabolic Panel:  Sodium   Date Value Ref Range Status   06/03/2021 130 (L) 133 - 144 mmol/L Final     Potassium   Date Value Ref Range Status   06/03/2021 4.2 3.4 - 5.3 mmol/L Final     Chloride   Date Value Ref Range Status   06/03/2021 98 94 - 109 mmol/L Final     Carbon Dioxide   Date Value Ref Range Status   06/03/2021 25 20 - 32 mmol/L Final     Anion Gap   Date Value Ref Range Status   06/03/2021 7 3 - 14 mmol/L Final     Glucose   Date Value Ref Range Status   06/03/2021 569 (HH) 70 - 99 mg/dL Final      Comment:     Critical Value called to and read back by  SHELBY IN PODIATRY AT 1128 ON 06.03.21 BY SIVA.       Urea Nitrogen   Date Value Ref Range Status   06/03/2021 13 7 - 30 mg/dL Final     Creatinine   Date Value Ref Range Status   06/03/2021 0.89 0.52 - 1.04 mg/dL Final     GFR Estimate   Date Value Ref Range Status   06/03/2021 77 >60 mL/min/[1.73_m2] Final     Comment:     Non  GFR Calc  Starting 12/18/2018, serum creatinine based estimated GFR (eGFR) will be   calculated using the Chronic Kidney Disease Epidemiology Collaboration   (CKD-EPI) equation.       Calcium   Date Value Ref Range Status   06/03/2021 8.7 8.5 - 10.1 mg/dL Final     Lab Results   Component Value Date    SED 8 06/03/2021     Lab Results   Component Value Date    CRP 30.0 06/03/2021     Uric Acid   Date Value Ref Range Status   06/03/2021 6.8 (H) 2.6 - 6.0 mg/dL Final   Venous doppler reviewed and negative for dvt.  SUBJECTIVE FINDINGS:  The 46-year-old female returns to clinic for diabetic foot cares.  She relates her right foot and leg have been painful and sore.  She states about 2 weeks ago, she felt she had a gout attack, so she took some colchicine and that seemed to help for a few days, but then she ran out.  Relates that the last week the right foot is worse and she cannot even put weight on it.  Relates no injuries.  She relates she does have a history of blood clots.  She is not currently on any blood thinners.  Denies any nausea, vomiting, fever, chills.  Relates that she has had gout in the past.  She relates she does have peripheral neuropathy.  She is diabetic.  She relates she needs new shoes.  She needs her toenails cut.    OBJECTIVE:  DP and PT are 2/4 bilaterally.  She has mild dorsally contracted digits bilaterally.  She has peripheral edema, right greater than left.  She has pain on palpation of the right foot and right lower leg with edema and palpable fluid in the posterior right leg.  She has erythema  and edema of the forefoot.  The erythema goes to about the mid foot.  There are no open lesions, no odor.  No gross calor.  Sharp/dull is decreased with 5.07 Wright-Kenn monofilament bilaterally.  Deep tendon reflexes are intact bilaterally.  She has dystrophic, long, incurvated, brittle nails with subungual debris, dystrophy, and discoloration, 1 through 5 bilaterally to differing degrees.    ASSESSMENT AND PLAN:  Gout, right foot, rule out DVT right leg.  She has right foot and leg pain.  She is diabetic with peripheral neuropathy and vascular disease with arterial and venous stasis disease.  She has arterial sclerosis on x-ray.  Labs and x-rays reviewed with her today in clinic.  She has onychomycosis present as well.  All the toenails bilaterally were debrided 1 through 5 upon consent.  Diagnosis and treatment options discussed with her.  Prescription for colchicine, Cipro and clindamycin given and use discussed with her.  She relates she is using a cane, but she would like something that she cannot put weight on her foot so Orthotics saw her today and got her a diabetic CAM boot and use discussed with her.  She is scheduled for later today for a venous Doppler to rule out DVT.  If this is positive she will have to go to the Emergency Room.  I gave her a referral to Rheumatology for her gout as she relates she is not seeing anybody for this.  Gave her referral to Vascular to further evaluate her arterial and venous disease.  Prescription for new diabetic shoes given and she is going to return to clinic and see me in 1 week.  Previous notes reviewed.

## 2021-06-03 NOTE — PATIENT INSTRUCTIONS
Thanks for coming today.  Ortho/Sports Medicine Clinic  [address] [place] [address]    To schedule future appointments in Ortho Clinic, you may call [phone].    To schedule ordered imaging by your provider:   Call [facility] [phone]    To schedule an injection ordered by your provider:  Call [facility] Injection scheduling line: [phone]  [facility] available online at:  [email or web address]    Please call if any further questions or concerns ([phone]).  Clinic hours 8 am to 5 pm.    Return to clinic (call) if symptoms worsen or fail to improve.

## 2021-06-03 NOTE — NURSING NOTE
Pricilla Brown's chief complaint for this visit includes:  Chief Complaint   Patient presents with     RECHECK     Pain and discoloration in right hallux     PCP: Lenore Roberts    Referring Provider:  No referring provider defined for this encounter.    BP (!) 149/88   Pulse 95   SpO2 98%   Data Unavailable     Do you need any medication refills at today's visit? Yumiko Hernandez CMA

## 2021-06-03 NOTE — TELEPHONE ENCOUNTER
Referring providers name, location, phone number and fax:   Dr Jean  Podiatry  Phone [phone]  Fax not listed      Reason for visit:   peripheral arterial and venous disease  Leg edema, right  Type II or unspecified type diabetes mellitus with neurological manifestations, ...  Venous stasis   Diabetes mellitus with peripheral vascular disease    Does patient have a referral:  Yes    Referral to specific provider?   No    Medical records or notes if possible:   In Epic    Please call Pt to schedule per Referral.

## 2021-06-03 NOTE — LETTER
6/3/2021         RE: Pricilla Brown  [address]  [facility] [address]        Dear Colleague,    Thank you for referring your patient, Pricilla Brown, to the [facility]. Please see a copy of my visit note below.    Past Medical History:   Diagnosis Date     A-fib (H) 2011    on coumadin since 1/13     Asthma     as a kid     Chest pain 2/1/2017     Chronic anticoagulation for a-fib 2/15/2013    INR's followed by coumadin clinic at      Diabetes mellitus (H) 2012     Diastolic heart failure 2/15/2013     Dilated cardiomyopathy (H) 1/8/2013     HTN (hypertension)      Hyperthyroidism     Graves, s/p I131 1/13, now on prednisone and methimazole     Morbid obesity (H)      Pulmonary embolism (H) 1/12    hospitalized in [place], on lovenox/coumadin for a few months but stopped, hypercoag w/u neg per pt     Sleep apnea     using CPAP     Patient Active Problem List   Diagnosis     Other chronic pulmonary heart diseases     Dilated cardiomyopathy (H)     Morbid obesity (H)     Diabetes mellitus without complication (H)     Chronic diastolic heart failure (H)     Chronic anticoagulation for a-fib     JYOTI (obstructive sleep apnea) CPAP Min Pressure of 13 and Max Pressure of 18     Hypothyroidism following radioiodine therapy     SOB (shortness of breath)     Azotemia     Asthma     Peritonsillar abscess     Plantar fasciitis     Neuropathic pain of lower extremity     Atrial fibrillation (H) [I48.91]     Long-term (current) use of anticoagulants [Z79.01]     Cellulitis     Chest pain     Foot pain     Class 3 obesity     Thyrotoxicosis     Respiratory abnormality     Personal history of tobacco use, presenting hazards to health     Healthcare maintenance     Cocaine abuse (H)     Acute on chronic systolic heart failure (H)     Other ill-defined heart diseases     No past surgical history on file.  Social History     Socioeconomic History     Marital status: Single     Spouse name: Not  on file     Number of children: Not on file     Years of education: Not on file     Highest education level: Not on file   Occupational History     Not on file   Social Needs     Financial resource strain: Not on file     Food insecurity     Worry: Not on file     Inability: Not on file     Transportation needs     Medical: Not on file     Non-medical: Not on file   Tobacco Use     Smoking status: Light Tobacco Smoker     Packs/day: 0.25     Years: 13.00     Pack years: 3.25     Types: Cigarettes     Smokeless tobacco: Never Used     Tobacco comment: down to 5 cigs   Substance and Sexual Activity     Alcohol use: No     Drug use: No     Sexual activity: Yes     Partners: Male     Birth control/protection: Condom   Lifestyle     Physical activity     Days per week: Not on file     Minutes per session: Not on file     Stress: Not on file   Relationships     Social connections     Talks on phone: Not on file     Gets together: Not on file     Attends Faith service: Not on file     Active member of club or organization: Not on file     Attends meetings of clubs or organizations: Not on file     Relationship status: Not on file     Intimate partner violence     Fear of current or ex partner: Not on file     Emotionally abused: Not on file     Physically abused: Not on file     Forced sexual activity: Not on file   Other Topics Concern     Parent/sibling w/ CABG, MI or angioplasty before 65F 55M? Not Asked   Social History Narrative    Single, no children        Gyn:        Last pap several years ago, no abnormal    No STIs            Patient is single.  She is no longer working.  She used to work in the area of customer service.  She is currently living with her sister in [place], [place].  She has no pets.  Patient has smoked a half pack of cigarettes a day for the past 10 plus years.  She states that she is down to 5 cigarettes a day with the aid of Wellbutrin.  She does not smoke cigars, pipes or chew  tobacco.  She has 1 cup of coffee in the morning.  She does not drink alcohol.  Patient does not exercise.      Family History   Problem Relation Age of Onset     Thyroid Disease Mother         Grave's D     Diabetes Mother      Heart Disease Mother      Cerebrovascular Disease Mother         dec. 54 yo     Hypertension Mother      Heart Disease Sister         Heart Murmur     Diabetes Sister      Heart Disease Father         dec in his 30s, MI     Psychotic Disorder Brother         Bipolar     Diabetes Brother      Thyroid Disease Brother         Hyper Thyroid     Heart Disease Brother      Thyroid Disease Sister         Hyper Thyroid     Thyroid Disease Sister         Hyper Thyroid     Thyroid Disease Sister         Mental Health Problems     Thyroid Disease Maternal Aunt      Thyroid Disease Maternal Uncle      Cancer No family hx of      Glaucoma No family hx of      Macular Degeneration No family hx of      Lab Results   Component Value Date    A1C >14.0 08/19/2020    A1C 8.4 10/11/2018    A1C 9.6 05/29/2018    A1C 9.0 04/07/2017    A1C 12.1 03/10/2016     Lab Results   Component Value Date    WBC 8.5 06/03/2021     Lab Results   Component Value Date    RBC 5.54 06/03/2021     Lab Results   Component Value Date    HGB 16.7 06/03/2021     Lab Results   Component Value Date    HCT 50.9 06/03/2021     No components found for: MCT  Lab Results   Component Value Date    MCV 92 06/03/2021     Lab Results   Component Value Date    MCH 30.1 06/03/2021     Lab Results   Component Value Date    MCHC 32.8 06/03/2021     Lab Results   Component Value Date    RDW 13.3 06/03/2021     Lab Results   Component Value Date     06/03/2021     Last Comprehensive Metabolic Panel:  Sodium   Date Value Ref Range Status   06/03/2021 130 (L) 133 - 144 mmol/L Final     Potassium   Date Value Ref Range Status   06/03/2021 4.2 3.4 - 5.3 mmol/L Final     Chloride   Date Value Ref Range Status   06/03/2021 98 94 - 109 mmol/L Final      Carbon Dioxide   Date Value Ref Range Status   06/03/2021 25 20 - 32 mmol/L Final     Anion Gap   Date Value Ref Range Status   06/03/2021 7 3 - 14 mmol/L Final     Glucose   Date Value Ref Range Status   06/03/2021 569 (HH) 70 - 99 mg/dL Final     Comment:     Critical Value called to and read back by  SHELBY IN PODIATRY AT 1128 ON 06.03.21 BY SIVA.       Urea Nitrogen   Date Value Ref Range Status   06/03/2021 13 7 - 30 mg/dL Final     Creatinine   Date Value Ref Range Status   06/03/2021 0.89 0.52 - 1.04 mg/dL Final     GFR Estimate   Date Value Ref Range Status   06/03/2021 77 >60 mL/min/[1.73_m2] Final     Comment:     Non  GFR Calc  Starting 12/18/2018, serum creatinine based estimated GFR (eGFR) will be   calculated using the Chronic Kidney Disease Epidemiology Collaboration   (CKD-EPI) equation.       Calcium   Date Value Ref Range Status   06/03/2021 8.7 8.5 - 10.1 mg/dL Final     Lab Results   Component Value Date    SED 8 06/03/2021     Lab Results   Component Value Date    CRP 30.0 06/03/2021     Uric Acid   Date Value Ref Range Status   06/03/2021 6.8 (H) 2.6 - 6.0 mg/dL Final   Venous doppler reviewed and negative for dvt.  SUBJECTIVE FINDINGS:  The 46-year-old female returns to clinic for diabetic foot cares.  She relates her right foot and leg have been painful and sore.  She states about 2 weeks ago, she felt she had a gout attack, so she took some colchicine and that seemed to help for a few days, but then she ran out.  Relates that the last week the right foot is worse and she cannot even put weight on it.  Relates no injuries.  She relates she does have a history of blood clots.  She is not currently on any blood thinners.  Denies any nausea, vomiting, fever, chills.  Relates that she has had gout in the past.  She relates she does have peripheral neuropathy.  She is diabetic.  She relates she needs new shoes.  She needs her toenails cut.    OBJECTIVE:  DP and PT are 2/4  bilaterally.  She has mild dorsally contracted digits bilaterally.  She has peripheral edema, right greater than left.  She has pain on palpation of the right foot and right lower leg with edema and palpable fluid in the posterior right leg.  She has erythema and edema of the forefoot.  The erythema goes to about the mid foot.  There are no open lesions, no odor.  No gross calor.  Sharp/dull is decreased with 5.07 Diamond-Kenn monofilament bilaterally.  Deep tendon reflexes are intact bilaterally.  She has dystrophic, long, incurvated, brittle nails with subungual debris, dystrophy, and discoloration, 1 through 5 bilaterally to differing degrees.    ASSESSMENT AND PLAN:  Gout, right foot, rule out DVT right leg.  She has right foot and leg pain.  She is diabetic with peripheral neuropathy and vascular disease with arterial and venous stasis disease.  She has arterial sclerosis on x-ray.  Labs and x-rays reviewed with her today in clinic.  She has onychomycosis present as well.  All the toenails bilaterally were debrided 1 through 5 upon consent.  Diagnosis and treatment options discussed with her.  Prescription for colchicine, Cipro and clindamycin given and use discussed with her.  She relates she is using a cane, but she would like something that she cannot put weight on her foot so Orthotics saw her today and got her a diabetic CAM boot and use discussed with her.  She is scheduled for later today for a venous Doppler to rule out DVT.  If this is positive she will have to go to the Emergency Room.  I gave her a referral to Rheumatology for her gout as she relates she is not seeing anybody for this.  Gave her referral to Vascular to further evaluate her arterial and venous disease.  Prescription for new diabetic shoes given and she is going to return to clinic and see me in 1 week.  Previous notes reviewed.            Again, thank you for allowing me to participate in the care of your patient.         Sincerely,        Norman Jean DPM

## 2021-06-04 DIAGNOSIS — I87.8 VENOUS STASIS: ICD-10-CM

## 2021-06-04 DIAGNOSIS — M79.2 NEUROPATHIC PAIN OF LOWER EXTREMITY: Primary | ICD-10-CM

## 2021-06-04 DIAGNOSIS — I73.9 PAD (PERIPHERAL ARTERY DISEASE) (H): ICD-10-CM

## 2021-06-04 DIAGNOSIS — L98.499 NON-HEALING ULCER (H): ICD-10-CM

## 2021-06-04 DIAGNOSIS — Z01.818 ENCOUNTER FOR OTHER PREPROCEDURAL EXAMINATION: ICD-10-CM

## 2021-06-04 NOTE — TELEPHONE ENCOUNTER
Orders placed for PAD/venous insufficiency work up. Message sent to pool to schedule with Dr. Haile at patient's earliest convenience.

## 2021-06-30 NOTE — PROGRESS NOTES
Progress Notes by Mindy Loyd CNP at 3/22/2021 10:00 AM     Author: Mindy Loyd CNP Service: -- Author Type: Nurse Practitioner    Filed: 3/22/2021 12:15 PM Encounter Date: 3/22/2021 Status: Signed    : Mindy Lody CNP (Nurse Practitioner)           Study Purpose: Atrial Fibrillation Algorithm Clinical Validation Study, prospective, multi-center, non significant risk       Physical Examination performed via live video encounter   General Appearance:   Alert, well appearing,no distress, normal body habitus, upright.   HENT/Mouth: Atraumatic normocephalic membranes moist, mucous membranes pink and moist  No nasal discharge or   bleeding gums.    EYES:  no scleral icterus, normal conjunctivae   Neck: no evidence of thyromegaly.  Supple.    Chest/Lungs:   No audible wheezing equal chest wall expansion. Non labored breathing.  No cough.   Cardiovascular:   No evidence of elevated jugular venous pressure.      Abdomen:  no evidence of abdominal distention. No observe juandice.     Extremities: no cyanosis or clubbing noted.   No visible edema bilaterally   Skin:   skin dry, no visible ecchymosis  No markings of the bilateral wrists.   Neurologic: Mood affect appropriate to place time and person   Normal arm motion bilateral, no tremors.  No evidence of focal defect.              COVID: No symptoms, chills, shortness of breath, or difficulty breathing, muscle or body aches, headache, loss of taste or smell, sore throat, runny nose, congestion, nausea, vomiting or diarrhea.according to the [place] Department of Health and Human Services based on the CARES Act.       Patient's blood sugar hemoglobin A1C is 14.9 last night it was checked in August 2020 and since this is not uncontrolled medical condition but does not qualify for the study.  NP explained this to patient     Mindy Loyd CNP

## 2021-06-30 NOTE — PROGRESS NOTES
Progress Notes by Lauren Kaminski RN at 3/22/2021 10:00 AM     Author: Lauren Kaminski RN Service: -- Author Type: Exercise Phys Spec    Filed: 3/22/2021 12:35 PM Encounter Date: 3/22/2021 Status: Signed    : Lauren Kaminski RN (Registered Nurse)       Jose [facility] Study Consent Note    Study Purpose: Atrial Fibrillation Algorithm Clinical Validation Study, prospective, multi-center, non-significant risk     Pricilla Brown was called today, 03/22/21, to discuss participation in the Parrot Study. The consent form version 3.0 was reviewed with subject. Subject was provided with a virtual copy of the consent form via [employer] e-consenting software.    The consent discuss included:    Study description and purpose     Qualifications for participation    Screening visit    Study procedures and follow up visits    Participant responsibilities     Study restrictions    Length of study    Study data    Potential risks and discomforts    New information    Potential benefits of participation    Incidental findings    Alternate therapies    Compensation for participation    Cost/Compensation for research related injury    Study Funding    Withdrawal participation    Confidentiality     Study contacts    Legal right    The subject was provided time to review the consent form and consider participation her questions were answered to her satisfaction. The patient has voluntarily agreed to participate in the above noted study.     The consent form version 3.0 and HIPPA form version 3.0 was signed 03/22/21 via [employer] e-consenting software. A copy of the signed consent form is delivered to patient via email from [employer] e-consent software. A copy will be placed in subject's medical record.     Past medical history:    Chronic diastolic heart failure- 2020  Dilated Cardiomyopathy- 2013   Asthma- 2007  Atrial Fibrillation- 2007  Obstructive Sleep Apnea- 2013    Medications:    Metoprolol Succinate; 200mg;  by mouth; daily  Spironolactone; 50mg; by mouth; daily  Bumex: 5mg; by mouth; twice daily        Allergies   Allergen Reactions   ? Penicillins Other (See Comments) and Unknown     CHILDHOOD ALLERGY  CHILDHOOD ALLERGY   ? Ibuprofen Hives and Rash       Lauren Kaminski, EPS, RN                  DISPLAY PLAN FREE TEXT

## 2021-06-30 NOTE — PROGRESS NOTES
Progress Notes by Lauren Kaminski RN at 3/22/2021 10:00 AM     Author: Lauren Kaminski RN Service: -- Author Type: Exercise Phys Spec    Filed: 3/22/2021 12:37 PM Encounter Date: 3/22/2021 Status: Signed    : Lauren Kaminski RN (Registered Nurse)         Jose Juan Study Inclusion / Exclusion Criteria    Study Purpose: Atrial Fibrillation Algorithm Clinical Validation Study, prospective, multi-center, non significant risk     Protocol Version V 3.0 - 10 December 2020    Subject Cohort: 3    Inclusion Criteria-all must be Yes  Yes/No Cohort Subject must meet all inclusion criteria:    Yes   All 1. Able to read, understand, and provide written informed consent.   Yes All 2. Willing and able to participate in the study procedures as described in the consent form.    Yes All 3. Be 22 years of age and older   Yes    All 4. Able to communicate effectively with and follow instructions from study staff   Yes All 5. Able to wear the wrist device for duration of study participation     NA Cohort 1 6. Have no known medical history of AF    N/A Cohort 2 7. have no known medical history of AF and active diagnosis of at least one of the following arrhythmias within the past 2 years:   A. Frequent PACs, defined as at least 1% of total beats of atrial ectopic beats by 24-48 hour Holter, ambulatory ECG monitor, or implantable loop recorder)   B. Frequent PVCs, defined as at least 1% of total beats of ventricular ectopic beats by 24-48 hour Holter, ambulatory ECG monitor, or implantable loop recorder   C. SVT, which will include atrial tachycardia, atrioventricular clementina re-entrant tachycardia, atrioventricular re-entrant tachycardia by 12-lead ECG or 24-48 hour Holter, ambulatory ECG monitor, or implantable loop recorder   D. NSVT, defined as three or more consecutive ventricular beats at a rate of at least 100 beats per minute and lasting no more than 30 seconds, by 12-lead ECG or 24-48 hour Holter, ambulatory ECG  monitor, or implantable loop recorder     Yes Cohort 3 and 4 8. have a known diagnosis of AF at the time of screening (confirmed by electronic medical record (EMR) or self-report) and have had a recent episode of AF, or confirmed AF on ECG, in the past 12 months     NA Cohort 4 9. have a known diagnosis of permanent AF at the time of screening (confirmed by EMR or self-report) and have had a recent episode of AF, or confirmed AF on ECG, in the past 12 months    Yes NA 10. Meet additional binning based on demographics             Exclusion Criteria-all must be no  Yes/No Criteria # Subject must not meet any exclusion criteria:    No All 1. Physical disability that prevents safe and adequate testing.   No All 2. Mental impairment resulting in limited ability to cooperate.   Yes All 3. Known uncontrolled medical conditions, Such as (but not limited to) significant anemia, important electrolyte imbalance and untreated or uncontrolled thyroid disease   No All 4. Open wound(s) on the wrist and / or forearm   No All 5. Tattoos, large moles, or scars on the wrist at the wrist device location    No All 6. Skin conditions on either wrist that would preclude subject from wearing a wristband on either wrist    No All 7. Known allergy or sensitivity to medical adhesives, isopropyl alcohol, wristbands, or ECG patch.   No All 8. Medical history or physical assessment finding that makes the subjects inappropriate for participation according to investigator(s)   No All 9. Participation in a previous study that used a wrist-worn sensor device with a simultaneous ECG reference patch    No All 10. Implantable cardiac devices such as a Pacemaker or Implantable Cardioverter defibrillator    No All 11. Clinically significant hand tremors, as judged by the investigator    No All 12. Acute illness including COVID and other respiratory illnesses    No Cohort 3 and 4 13. Subject with known history of AF on a rhythm control medications with  history of complete AF rhythm control (I.e history of zero AF burden) will be excluded from Cohort 3 and 4.  (Subjects enrolled into Cohort 2 can be on rate control medications)      Subject has not met exclusion criteria. Please refer to NP note for detailed explanation.     Subject is a screen fail.     Lauren Kaminski, EPS, RN

## 2021-06-30 NOTE — PROGRESS NOTES
Progress Notes by Marietta Aguirre at 3/22/2021 10:00 AM     Author: Marietta Aguirre Service: -- Author Type: Patient Access    Filed: 3/22/2021 10:32 AM Encounter Date: 3/22/2021 Status: Signed    : Marietta Aguirre (Patient Access)       Parrot Study Consent Note    Called patient and they did not answer. Left message to call back and reschedule.     Marietta Aguirre

## 2021-07-14 ENCOUNTER — TELEPHONE (OUTPATIENT)
Dept: ENDOCRINOLOGY | Facility: CLINIC | Age: 46
End: 2021-07-14

## 2021-07-14 ENCOUNTER — NURSE TRIAGE (OUTPATIENT)
Dept: ENDOCRINOLOGY | Facility: CLINIC | Age: 46
End: 2021-07-14

## 2021-07-14 DIAGNOSIS — I50.33 ACUTE ON CHRONIC HEART FAILURE WITH PRESERVED EJECTION FRACTION (H): ICD-10-CM

## 2021-07-14 DIAGNOSIS — E11.9 TYPE 2 DIABETES MELLITUS (H): ICD-10-CM

## 2021-07-14 RX ORDER — BLOOD SUGAR DIAGNOSTIC
STRIP MISCELLANEOUS
Qty: 360 STRIP | Refills: 2 | Status: SHIPPED | OUTPATIENT
Start: 2021-07-14

## 2021-07-14 NOTE — TELEPHONE ENCOUNTER
I contacted Pricilla who is using Accu check adi plus testing 4 times daily . New order sent to Pharmacy downstairs. She was last seen 4/2021 so hopefully the order will go through. Sarina Haji RN on 7/14/2021 at 1:12 PM

## 2021-07-15 NOTE — TELEPHONE ENCOUNTER
Pt called pharmacy not able to get test strips due to no follow up appts. Last visit 04/27/21 no show.  Pt is scheduled 07/30/21 (next avail). Pt unable to test blood sugar for 2 days. Pt requesting to speak with Sarina

## 2021-07-15 NOTE — TELEPHONE ENCOUNTER
[facility]ert[facility] can you see what we ave for meters with extra test strips that havn't  there in clinic. Then call Pricilla to  if you find some. Look in the white cabinet in that room and on top as well and in [facility] space as well as our cabinet/ I tought threre were one touch verio strips and meters

## 2021-07-15 NOTE — TELEPHONE ENCOUNTER
Spoke w/ Pt: she will send her PCA to retreive her meter and test strips from the  downstairs.   Meter and strips in labeled bag taken to [facility] for .   Linn Kelly RN on 7/15/2021 at 11:07 AM

## 2021-07-16 NOTE — TELEPHONE ENCOUNTER
BUMETANIDE 1MG TABS               Last Written Prescription Date:  7-23-19  Last Fill Quantity: 300,   # refills: 11  Last Office Visit : 9-28-20   Future Office visit:  none    Sodium   Date Value Ref Range Status   06/03/2021 130 (L) 133 - 144 mmol/L Final     BP Readings from Last 3 Encounters:   06/03/21 (!) 149/88   10/29/19 134/86   07/24/19 (!) 140/93     Routing refill request to provider for review/approval because:  Gap in RF  Abnormal Na,   BP above protocol parameters

## 2021-07-19 RX ORDER — BUMETANIDE 1 MG/1
5 TABLET ORAL 2 TIMES DAILY
Qty: 300 TABLET | Refills: 1 | Status: SHIPPED | OUTPATIENT
Start: 2021-07-19 | End: 2022-01-01

## 2021-07-23 ENCOUNTER — LAB (OUTPATIENT)
Dept: LAB | Facility: CLINIC | Age: 46
End: 2021-07-23

## 2021-07-23 ENCOUNTER — TELEPHONE (OUTPATIENT)
Dept: NURSING | Facility: CLINIC | Age: 46
End: 2021-07-23

## 2021-07-23 ENCOUNTER — OFFICE VISIT (OUTPATIENT)
Dept: ENDOCRINOLOGY | Facility: CLINIC | Age: 46
End: 2021-07-23
Payer: MEDICARE

## 2021-07-23 VITALS
WEIGHT: 293 LBS | RESPIRATION RATE: 24 BRPM | HEART RATE: 97 BPM | HEIGHT: 67 IN | BODY MASS INDEX: 45.99 KG/M2 | SYSTOLIC BLOOD PRESSURE: 182 MMHG | DIASTOLIC BLOOD PRESSURE: 124 MMHG

## 2021-07-23 DIAGNOSIS — Z79.4 TYPE 2 DIABETES MELLITUS WITH HYPERGLYCEMIA, WITH LONG-TERM CURRENT USE OF INSULIN (H): Primary | ICD-10-CM

## 2021-07-23 DIAGNOSIS — E11.65 TYPE 2 DIABETES MELLITUS WITH HYPERGLYCEMIA, WITH LONG-TERM CURRENT USE OF INSULIN (H): Primary | ICD-10-CM

## 2021-07-23 DIAGNOSIS — E11.9 TYPE 2 DIABETES MELLITUS (H): ICD-10-CM

## 2021-07-23 DIAGNOSIS — I10 ESSENTIAL HYPERTENSION: ICD-10-CM

## 2021-07-23 DIAGNOSIS — Z79.4 TYPE 2 DIABETES MELLITUS WITH HYPERGLYCEMIA, WITH LONG-TERM CURRENT USE OF INSULIN (H): ICD-10-CM

## 2021-07-23 DIAGNOSIS — E11.65 TYPE 2 DIABETES MELLITUS WITH HYPERGLYCEMIA, WITH LONG-TERM CURRENT USE OF INSULIN (H): ICD-10-CM

## 2021-07-23 DIAGNOSIS — E87.6 HYPOKALEMIA: ICD-10-CM

## 2021-07-23 LAB
ALBUMIN SERPL-MCNC: 2.9 G/DL (ref 3.4–5)
ALP SERPL-CCNC: 87 U/L (ref 40–150)
ALT SERPL W P-5'-P-CCNC: 23 U/L (ref 0–50)
ANION GAP SERPL CALCULATED.3IONS-SCNC: 6 MMOL/L (ref 3–14)
AST SERPL W P-5'-P-CCNC: 8 U/L (ref 0–45)
BILIRUB DIRECT SERPL-MCNC: 0.2 MG/DL (ref 0–0.2)
BILIRUB SERPL-MCNC: 0.5 MG/DL (ref 0.2–1.3)
BUN SERPL-MCNC: 13 MG/DL (ref 7–30)
CALCIUM SERPL-MCNC: 8.8 MG/DL (ref 8.5–10.1)
CHLORIDE BLD-SCNC: 98 MMOL/L (ref 94–109)
CHOLEST SERPL-MCNC: 192 MG/DL
CO2 SERPL-SCNC: 27 MMOL/L (ref 20–32)
CREAT SERPL-MCNC: 0.87 MG/DL (ref 0.52–1.04)
CREAT UR-MCNC: 57 MG/DL
FASTING STATUS PATIENT QL REPORTED: YES
GFR SERPL CREATININE-BSD FRML MDRD: 80 ML/MIN/1.73M2
GLUCOSE BLD-MCNC: 483 MG/DL (ref 70–99)
HBA1C MFR BLD: >15 % (ref 0–5.6)
HDLC SERPL-MCNC: 47 MG/DL
LDLC SERPL CALC-MCNC: 117 MG/DL
MICROALBUMIN UR-MCNC: 1020 MG/DL
MICROALBUMIN/CREAT UR: 1789.47 MG/G CR (ref 0–25)
NONHDLC SERPL-MCNC: 145 MG/DL
POTASSIUM BLD-SCNC: 4.2 MMOL/L (ref 3.4–5.3)
PROT SERPL-MCNC: 8 G/DL (ref 6.8–8.8)
SODIUM SERPL-SCNC: 131 MMOL/L (ref 133–144)
TRIGL SERPL-MCNC: 141 MG/DL

## 2021-07-23 PROCEDURE — 80061 LIPID PANEL: CPT

## 2021-07-23 PROCEDURE — 80053 COMPREHEN METABOLIC PANEL: CPT

## 2021-07-23 PROCEDURE — 82043 UR ALBUMIN QUANTITATIVE: CPT | Performed by: INTERNAL MEDICINE

## 2021-07-23 PROCEDURE — 99214 OFFICE O/P EST MOD 30 MIN: CPT | Performed by: INTERNAL MEDICINE

## 2021-07-23 PROCEDURE — 82248 BILIRUBIN DIRECT: CPT

## 2021-07-23 PROCEDURE — 36415 COLL VENOUS BLD VENIPUNCTURE: CPT | Performed by: INTERNAL MEDICINE

## 2021-07-23 PROCEDURE — 83036 HEMOGLOBIN GLYCOSYLATED A1C: CPT

## 2021-07-23 RX ORDER — FLASH GLUCOSE SCANNING READER
1 EACH MISCELLANEOUS DAILY
Qty: 1 EACH | Refills: 0 | Status: SHIPPED | OUTPATIENT
Start: 2021-07-23 | End: 2021-08-06

## 2021-07-23 RX ORDER — DULAGLUTIDE 3 MG/.5ML
3 INJECTION, SOLUTION SUBCUTANEOUS WEEKLY
Qty: 6 ML | Refills: 3 | Status: SHIPPED | OUTPATIENT
Start: 2021-07-23 | End: 2021-11-23

## 2021-07-23 RX ORDER — FLASH GLUCOSE SENSOR
1 KIT MISCELLANEOUS
Qty: 6 EACH | Refills: 3 | Status: SHIPPED | OUTPATIENT
Start: 2021-07-23 | End: 2021-12-15

## 2021-07-23 ASSESSMENT — MIFFLIN-ST. JEOR: SCORE: 2648.94

## 2021-07-23 NOTE — LETTER
7/23/2021         RE: Pricilla Brown  [address]  [facility] [address]        Dear Colleague,    Thank you for referring your patient, Pricilla Brown, to the [facility]. Please see a copy of my visit note below.    CC: DM.     HPI:   Patient presents for management of DM.   Diagnosed in 2013.     Previously seen by Thelma FITZPATRICK.     Taking trulicity 1.5 mg once a week.   Jardiance 25 mg every day  Toujeo 160 U every day  Novolog 100 U TID AC, 2U/50 sliding scale insulin     Ran out of test strips 1 week ago.   She was checking 4 times a day.   Reports HS readings are the highest.   She does snack after dinner.     She tries to limit her carb intake.     ROS: 10 point ROS neg other than the symptoms noted above in the HPI.    PMH:   Patient Active Problem List   Diagnosis     Other chronic pulmonary heart diseases     Dilated cardiomyopathy (H)     Morbid obesity (H)     Diabetes mellitus without complication (H)     Chronic diastolic heart failure (H)     Chronic anticoagulation for a-fib     JYOTI (obstructive sleep apnea) CPAP Min Pressure of 13 and Max Pressure of 18     Hypothyroidism following radioiodine therapy     SOB (shortness of breath)     Azotemia     Asthma     Peritonsillar abscess     Plantar fasciitis     Neuropathic pain of lower extremity     Atrial fibrillation (H) [I48.91]     Long-term (current) use of anticoagulants [Z79.01]     Cellulitis     Chest pain     Foot pain     Class 3 obesity     Thyrotoxicosis     Respiratory abnormality     Personal history of tobacco use, presenting hazards to health     Healthcare maintenance     Cocaine abuse (H)     Acute on chronic systolic heart failure (H)     Other ill-defined heart diseases     Meds:  Current Outpatient Medications   Medication     ACCU-CHEK RON PLUS test strip     acetaminophen (TYLENOL) 325 MG tablet     albuterol (PROAIR HFA/PROVENTIL HFA/VENTOLIN HFA) 108 (90 Base) MCG/ACT inhaler      bisacodyl (DULCOLAX) 10 MG suppository     blood glucose (NO BRAND SPECIFIED) lancets standard     blood glucose (NO BRAND SPECIFIED) test strip     blood glucose (NO BRAND SPECIFIED) test strip     blood glucose monitoring (ACCU-CHEK FASTCLIX) lancets     blood glucose monitoring (IBG STAR) meter device kit     blood glucose monitoring (NO BRAND SPECIFIED) meter device kit     blood glucose monitoring (NO BRAND SPECIFIED) meter device kit     bumetanide (BUMEX) 1 MG tablet     buPROPion (WELLBUTRIN SR) 100 MG 12 hr tablet     capsaicin (ZOSTRIX) 0.025 % external cream     ciclopirox (LOPROX) 0.77 % cream     ciclopirox (LOPROX) 0.77 % cream     ciclopirox (PENLAC) 8 % external solution     ciprofloxacin (CIPRO) 500 MG tablet     clindamycin (CLEOCIN) 300 MG capsule     clotrimazole (LOTRIMIN) 1 % external cream     colchicine (COLCYRS) 0.6 MG tablet     colchicine (COLCYRS) 0.6 MG tablet     diclofenac (VOLTAREN) 1 % topical gel     dulaglutide (TRULICITY) 1.5 MG/0.5ML pen     DULoxetine (CYMBALTA) 20 MG capsule     empagliflozin (JARDIANCE) 25 MG TABS tablet     gabapentin (NEURONTIN) 300 MG capsule     hydrALAZINE (APRESOLINE) 25 MG tablet     insulin aspart (NOVOLOG FLEXPEN) 100 UNIT/ML pen     insulin glargine U-300 (TOUJEO SOLOSTAR) 300 UNIT/ML (1 units dial) pen     insulin pen needle (BD RIVER U/F) 32G X 4 MM miscellaneous     levothyroxine (SYNTHROID/LEVOTHROID) 200 MCG tablet     metoprolol succinate ER (TOPROL-XL) 200 MG 24 hr tablet     nicotine (NICODERM CQ) 14 MG/24HR 24 hr patch     nystatin (MYCOSTATIN) 171548 UNIT/GM external cream     order for DME     order for DME     order for DME     ORDER FOR DME     oxyCODONE-acetaminophen (PERCOCET) 5-325 MG per tablet     polyethylene glycol (MIRALAX/GLYCOLAX) powder     potassium chloride ER (KLOR-CON M) 20 MEQ CR tablet     Respiratory Therapy Supplies (NEBULIZER COMPRESSOR) KIT     senna (SENOKOT) 8.6 MG tablet     tiotropium (SPIRIVA HANDIHALER) 18 MCG  "inhalation capsule     topiramate (TOPAMAX) 50 MG tablet     TRULICITY 0.75 MG/0.5ML pen     clindamycin (CLEOCIN) 300 MG capsule     cyclobenzaprine (FLEXERIL) 10 MG tablet     Efinaconazole 10 % SOLN     isosorbide dinitrate (ISORDIL) 20 MG tablet     metolazone (ZAROXOLYN) 2.5 MG tablet     morphine (MS CONTIN) 15 MG 12 hr tablet     spironolactone (ALDACTONE) 50 MG tablet     No current facility-administered medications for this visit.     Facility-Administered Medications Ordered in Other Visits   Medication     sodium chloride (PF) 0.9% PF flush 10 mL      FHX:   Mother and four siblings have DM.     SHX:   PPD     Exam:   Vital signs:      BP: (!) 182/124 Pulse: 97   Resp: 24       Height: 170.2 cm (5' 7\") Weight: (!) 198.1 kg (436 lb 12.8 oz)  Estimated body mass index is 68.41 kg/m  as calculated from the following:    Height as of this encounter: 1.702 m (5' 7\").    Weight as of this encounter: 198.1 kg (436 lb 12.8 oz).  Gen: In NAD.   HEENT: no proptosis or lid lag, EOMI, MMM.   Card: S1 S2 RRR no m/r/g. +2 LE edema.   Pulm: CTA b/l.   GI: NT ND +BS.   MSK: no gross deformities.   Derm: no rashes or lesions.   Neuro: no tremor, +2 DTR's. Sensation to monofilament intact at metatarsals.     A/P:   Type 2 DM - Chronic and uncontrolled. She is using large volumes of insulin. Reports currently having high readings in the evening. Discussed CGM.   -Increase trulicity to 3 mg a week.   -Continue jardiance.   -No change to toujeo or novolog.   -Rx for test strips and Jessica system.   -Labs today.   -Schedule an eye exam in the next 3 months.   -ASA optional.  -BP: elevated today. Missed AM meds. Urged compliance.   -NAFL/GRAHAM: normal ALT and AST in 2020.   -Lipids: , HDL 47, Trg 216 in 2020. Not taking for unclear reason.    Resume statin. Will determine dose based on labs.   -Microalbumin due. ACEi not indicated yet.   -Eyes: exam in 2020 mild NPDR left eye.   -Smokin/4 PPD. Quit date agreed " as 9/1/21.     HTN - as above.     Dyslipidemia - as above.     Tobacco use - as above.     Vladislav Wright M.D          Again, thank you for allowing me to participate in the care of your patient.        Sincerely,        Vladislav Wright MD

## 2021-07-23 NOTE — PROGRESS NOTES
CC: DM.     HPI:   Patient presents for management of DM.   Diagnosed in 2013.     Previously seen by Thelma FITZPATRICK.     Taking trulicity 1.5 mg once a week.   Jardiance 25 mg every day  Toujeo 160 U every day  Novolog 100 U TID AC, 2U/50 sliding scale insulin     Ran out of test strips 1 week ago.   She was checking 4 times a day.   Reports HS readings are the highest.   She does snack after dinner.     She tries to limit her carb intake.     ROS: 10 point ROS neg other than the symptoms noted above in the HPI.    PMH:   Patient Active Problem List   Diagnosis     Other chronic pulmonary heart diseases     Dilated cardiomyopathy (H)     Morbid obesity (H)     Diabetes mellitus without complication (H)     Chronic diastolic heart failure (H)     Chronic anticoagulation for a-fib     JYOTI (obstructive sleep apnea) CPAP Min Pressure of 13 and Max Pressure of 18     Hypothyroidism following radioiodine therapy     SOB (shortness of breath)     Azotemia     Asthma     Peritonsillar abscess     Plantar fasciitis     Neuropathic pain of lower extremity     Atrial fibrillation (H) [I48.91]     Long-term (current) use of anticoagulants [Z79.01]     Cellulitis     Chest pain     Foot pain     Class 3 obesity     Thyrotoxicosis     Respiratory abnormality     Personal history of tobacco use, presenting hazards to health     Healthcare maintenance     Cocaine abuse (H)     Acute on chronic systolic heart failure (H)     Other ill-defined heart diseases     Meds:  Current Outpatient Medications   Medication     ACCU-CHEK RON PLUS test strip     acetaminophen (TYLENOL) 325 MG tablet     albuterol (PROAIR HFA/PROVENTIL HFA/VENTOLIN HFA) 108 (90 Base) MCG/ACT inhaler     bisacodyl (DULCOLAX) 10 MG suppository     blood glucose (NO BRAND SPECIFIED) lancets standard     blood glucose (NO BRAND SPECIFIED) test strip     blood glucose (NO BRAND SPECIFIED) test strip     blood glucose monitoring (ACCU-CHEK FASTCLIX) lancets     blood  glucose monitoring (IBG STAR) meter device kit     blood glucose monitoring (NO BRAND SPECIFIED) meter device kit     blood glucose monitoring (NO BRAND SPECIFIED) meter device kit     bumetanide (BUMEX) 1 MG tablet     buPROPion (WELLBUTRIN SR) 100 MG 12 hr tablet     capsaicin (ZOSTRIX) 0.025 % external cream     ciclopirox (LOPROX) 0.77 % cream     ciclopirox (LOPROX) 0.77 % cream     ciclopirox (PENLAC) 8 % external solution     ciprofloxacin (CIPRO) 500 MG tablet     clindamycin (CLEOCIN) 300 MG capsule     clotrimazole (LOTRIMIN) 1 % external cream     colchicine (COLCYRS) 0.6 MG tablet     colchicine (COLCYRS) 0.6 MG tablet     diclofenac (VOLTAREN) 1 % topical gel     dulaglutide (TRULICITY) 1.5 MG/0.5ML pen     DULoxetine (CYMBALTA) 20 MG capsule     empagliflozin (JARDIANCE) 25 MG TABS tablet     gabapentin (NEURONTIN) 300 MG capsule     hydrALAZINE (APRESOLINE) 25 MG tablet     insulin aspart (NOVOLOG FLEXPEN) 100 UNIT/ML pen     insulin glargine U-300 (TOUJEO SOLOSTAR) 300 UNIT/ML (1 units dial) pen     insulin pen needle (BD RIVER U/F) 32G X 4 MM miscellaneous     levothyroxine (SYNTHROID/LEVOTHROID) 200 MCG tablet     metoprolol succinate ER (TOPROL-XL) 200 MG 24 hr tablet     nicotine (NICODERM CQ) 14 MG/24HR 24 hr patch     nystatin (MYCOSTATIN) 172280 UNIT/GM external cream     order for DME     order for DME     order for DME     ORDER FOR DME     oxyCODONE-acetaminophen (PERCOCET) 5-325 MG per tablet     polyethylene glycol (MIRALAX/GLYCOLAX) powder     potassium chloride ER (KLOR-CON M) 20 MEQ CR tablet     Respiratory Therapy Supplies (NEBULIZER COMPRESSOR) KIT     senna (SENOKOT) 8.6 MG tablet     tiotropium (SPIRIVA HANDIHALER) 18 MCG inhalation capsule     topiramate (TOPAMAX) 50 MG tablet     TRULICITY 0.75 MG/0.5ML pen     clindamycin (CLEOCIN) 300 MG capsule     cyclobenzaprine (FLEXERIL) 10 MG tablet     Efinaconazole 10 % SOLN     isosorbide dinitrate (ISORDIL) 20 MG tablet     metolazone  "(ZAROXOLYN) 2.5 MG tablet     morphine (MS CONTIN) 15 MG 12 hr tablet     spironolactone (ALDACTONE) 50 MG tablet     No current facility-administered medications for this visit.     Facility-Administered Medications Ordered in Other Visits   Medication     sodium chloride (PF) 0.9% PF flush 10 mL      FHX:   Mother and four siblings have DM.     SHX:   PPD     Exam:   Vital signs:      BP: (!) 182/124 Pulse: 97   Resp: 24       Height: 170.2 cm (5' 7\") Weight: (!) 198.1 kg (436 lb 12.8 oz)  Estimated body mass index is 68.41 kg/m  as calculated from the following:    Height as of this encounter: 1.702 m (5' 7\").    Weight as of this encounter: 198.1 kg (436 lb 12.8 oz).  Gen: In NAD.   HEENT: no proptosis or lid lag, EOMI, MMM.   Card: S1 S2 RRR no m/r/g. +2 LE edema.   Pulm: CTA b/l.   GI: NT ND +BS.   MSK: no gross deformities.   Derm: no rashes or lesions.   Neuro: no tremor, +2 DTR's. Sensation to monofilament intact at metatarsals.     A/P:   Type 2 DM - Chronic and uncontrolled. She is using large volumes of insulin. Reports currently having high readings in the evening. Discussed CGM.   -Increase trulicity to 3 mg a week.   -Continue jardiance.   -No change to toujeo or novolog.   -Rx for test strips and Jessica system.   -Labs today.   -Schedule an eye exam in the next 3 months.   -ASA optional.  -BP: elevated today. Missed AM meds. Urged compliance.   -NAFL/GRAHAM: normal ALT and AST in 2020.   -Lipids: , HDL 47, Trg 216 in 2020. Not taking for unclear reason.    Resume statin. Will determine dose based on labs.   -Microalbumin due. ACEi not indicated yet.   -Eyes: exam in 2020 mild NPDR left eye.   -Smokin/4 PPD. Quit date agreed as 21.     HTN - as above.     Dyslipidemia - as above.     Tobacco use - as above.     Vladislav Wright M.D"

## 2021-07-23 NOTE — NURSING NOTE
"Chief Complaint   Patient presents with     [facility]     Diabetes       Initial BP (!) 182/124 (BP Location: Right arm, Patient Position: Sitting, Cuff Size: Adult Large)   Pulse 97   Resp 24   Ht 1.702 m (5' 7\")   Wt (!) 198.1 kg (436 lb 12.8 oz)   BMI 68.41 kg/m   Estimated body mass index is 68.41 kg/m  as calculated from the following:    Height as of this encounter: 1.702 m (5' 7\").    Weight as of this encounter: 198.1 kg (436 lb 12.8 oz).  BP completed using cuff size: large  Medications and allergies reviewed.      Shelbi FAN MA    "

## 2021-07-23 NOTE — PATIENT INSTRUCTIONS
-Smoking quit date of 9/1/21.    -Increase trulicity to 3 mg a week.   -Continue jardiance.   -No change to toujeo or novolog.   -Rx for test strips and Jessica system.     -Labs today.     -Schedule an eye exam in the next 3 months.     See me in 1 month.

## 2021-07-23 NOTE — TELEPHONE ENCOUNTER
Lab calling with critical result.  Pt's Glucose was 483.    Message sent to Dr Wright's pool.  Herminia Moreno RN 07/23/21 3:26 PM  [facility] [place] Nurse Advisor

## 2021-07-23 NOTE — TELEPHONE ENCOUNTER
Called and spoke with patient regarding glucose result. Patient was driving. I asked her to pull over so she can check her blood sugar level. She stated she feels okay besides feeling a bit tired. Denies blurred vision, difficulty breathing, thirst. Patient took 160 U Tujeo this morning and 100 U Novolog 30-40 minutes ago.She did not take the Novolog on a sliding scale/do correction as she did not know what her blood sugar was. I asked her to pull over so she can check her blood sugar level. Patient's blood sugar was 369. I advised patient to do correction as she had Novolog in her car and to check her blood sugar again in 15 minutes. Patient agreed with this plan. She was on the way to the pharmacy and switched to the passenger seat so her  could drive. I advised her that she needs to go to the ER if blood sugar rises over 400. Patient stated she will do this.     Layton CARDONA RN....7/23/2021 3:50 PM

## 2021-07-27 ENCOUNTER — TELEPHONE (OUTPATIENT)
Dept: ENDOCRINOLOGY | Facility: CLINIC | Age: 46
End: 2021-07-27

## 2021-07-27 ENCOUNTER — DOCUMENTATION ONLY (OUTPATIENT)
Dept: SLEEP MEDICINE | Facility: CLINIC | Age: 46
End: 2021-07-27

## 2021-07-27 RX ORDER — POTASSIUM CHLORIDE 1500 MG/1
TABLET, EXTENDED RELEASE ORAL
Qty: 240 TABLET | Refills: 6 | Status: ON HOLD | OUTPATIENT
Start: 2021-07-27 | End: 2023-01-01

## 2021-07-27 RX ORDER — EMPAGLIFLOZIN 25 MG/1
TABLET, FILM COATED ORAL
Qty: 90 TABLET | Refills: 3 | Status: SHIPPED | OUTPATIENT
Start: 2021-07-27 | End: 2022-01-01

## 2021-07-27 NOTE — TELEPHONE ENCOUNTER
9/28/2020 Last visit PAMELA Rodriguez done 7/23/21  [facility]. Warning override in place/ aamodt/ Cards

## 2021-07-27 NOTE — TELEPHONE ENCOUNTER
Diabetes Education Scheduling Outreach #1:    Call to patient to schedule. Left message with phone number to call to schedule.    Plan for 2nd outreach attempt within 2 business days.    Pricilla Segura OnCall  Diabetes and Nutrition Scheduling

## 2021-07-27 NOTE — TELEPHONE ENCOUNTER
JARDIANCE 25MG TABS   Last Written Prescription Date:  4/16/20  Last Fill Quantity:90 ,   # refills:3  Last Office Visit : 7/26/21 Kyle: [facility]    Future Office visit:  None    Routing refill request to provider for review/approval because:  Last visit Dr Wright: [facility]

## 2021-07-27 NOTE — TELEPHONE ENCOUNTER
Passes r/f protocol and matches plan in MD note.    Jamilah LIN RN Specialty Triage 7/27/2021 11:43 AM

## 2021-07-27 NOTE — PROGRESS NOTES
7/27/2021- JL- LEFT VM THAT HER WARRANTY CPAP IS WAITING FOR HER TO PICK IT UP AT  AT [facility].  [facility]

## 2021-07-28 ENCOUNTER — OFFICE VISIT (OUTPATIENT)
Dept: PODIATRY | Facility: CLINIC | Age: 46
End: 2021-07-28
Payer: MEDICARE

## 2021-07-28 DIAGNOSIS — R60.0 LEG EDEMA, RIGHT: ICD-10-CM

## 2021-07-28 DIAGNOSIS — M10.071 ACUTE IDIOPATHIC GOUT OF RIGHT FOOT: ICD-10-CM

## 2021-07-28 DIAGNOSIS — M79.605 LEFT LEG PAIN: ICD-10-CM

## 2021-07-28 DIAGNOSIS — I89.0 LYMPHEDEMA: ICD-10-CM

## 2021-07-28 DIAGNOSIS — I87.8 VENOUS STASIS: ICD-10-CM

## 2021-07-28 DIAGNOSIS — R60.0 LEG EDEMA, LEFT: ICD-10-CM

## 2021-07-28 DIAGNOSIS — M10.072 ACUTE IDIOPATHIC GOUT OF LEFT FOOT: ICD-10-CM

## 2021-07-28 DIAGNOSIS — M79.604 RIGHT LEG PAIN: ICD-10-CM

## 2021-07-28 DIAGNOSIS — E11.51 DIABETES MELLITUS WITH PERIPHERAL VASCULAR DISEASE (H): ICD-10-CM

## 2021-07-28 DIAGNOSIS — E11.49 TYPE II OR UNSPECIFIED TYPE DIABETES MELLITUS WITH NEUROLOGICAL MANIFESTATIONS, NOT STATED AS UNCONTROLLED(250.60) (H): Primary | ICD-10-CM

## 2021-07-28 LAB
BASOPHILS # BLD AUTO: 0.1 10E3/UL (ref 0–0.2)
BASOPHILS NFR BLD AUTO: 1 %
CRP SERPL HS-MCNC: 26.8 MG/L
EOSINOPHIL # BLD AUTO: 0.1 10E3/UL (ref 0–0.7)
EOSINOPHIL NFR BLD AUTO: 1 %
ERYTHROCYTE [DISTWIDTH] IN BLOOD BY AUTOMATED COUNT: 13.3 % (ref 10–15)
ERYTHROCYTE [SEDIMENTATION RATE] IN BLOOD BY WESTERGREN METHOD: 8 MM/HR (ref 0–20)
HCT VFR BLD AUTO: 53 % (ref 35–47)
HGB BLD-MCNC: 17.2 G/DL (ref 11.7–15.7)
IMM GRANULOCYTES # BLD: 0.1 10E3/UL
IMM GRANULOCYTES NFR BLD: 1 %
LYMPHOCYTES # BLD AUTO: 2 10E3/UL (ref 0.8–5.3)
LYMPHOCYTES NFR BLD AUTO: 21 %
MCH RBC QN AUTO: 30.3 PG (ref 26.5–33)
MCHC RBC AUTO-ENTMCNC: 32.5 G/DL (ref 31.5–36.5)
MCV RBC AUTO: 94 FL (ref 78–100)
MONOCYTES # BLD AUTO: 0.7 10E3/UL (ref 0–1.3)
MONOCYTES NFR BLD AUTO: 7 %
NEUTROPHILS # BLD AUTO: 6.4 10E3/UL (ref 1.6–8.3)
NEUTROPHILS NFR BLD AUTO: 69 %
NRBC # BLD AUTO: 0 10E3/UL
NRBC BLD AUTO-RTO: 0 /100
PLATELET # BLD AUTO: 253 10E3/UL (ref 150–450)
RBC # BLD AUTO: 5.67 10E6/UL (ref 3.8–5.2)
URATE SERPL-MCNC: 7.3 MG/DL (ref 2.6–6)
WBC # BLD AUTO: 9.3 10E3/UL (ref 4–11)

## 2021-07-28 PROCEDURE — 36415 COLL VENOUS BLD VENIPUNCTURE: CPT | Performed by: PODIATRIST

## 2021-07-28 PROCEDURE — 86141 C-REACTIVE PROTEIN HS: CPT | Performed by: PODIATRIST

## 2021-07-28 PROCEDURE — 85652 RBC SED RATE AUTOMATED: CPT | Performed by: PODIATRIST

## 2021-07-28 PROCEDURE — 84550 ASSAY OF BLOOD/URIC ACID: CPT | Performed by: PODIATRIST

## 2021-07-28 PROCEDURE — 99215 OFFICE O/P EST HI 40 MIN: CPT | Performed by: PODIATRIST

## 2021-07-28 PROCEDURE — 85025 COMPLETE CBC W/AUTO DIFF WBC: CPT | Performed by: PODIATRIST

## 2021-07-28 RX ORDER — COLCHICINE 0.6 MG/1
0.6 TABLET ORAL 2 TIMES DAILY
Qty: 12 TABLET | Refills: 0 | Status: CANCELLED | OUTPATIENT
Start: 2021-07-28

## 2021-07-28 RX ORDER — METHYLPREDNISOLONE 4 MG
TABLET, DOSE PACK ORAL
Qty: 21 TABLET | Refills: 0 | Status: SHIPPED | OUTPATIENT
Start: 2021-07-28 | End: 2021-07-29

## 2021-07-28 NOTE — PROGRESS NOTES
Called and talked with patient about lab results and medications. Patient expressed understanding about changing to Medrol dose pack and would like this sent to [facility]. She has not heard from endocrine yet about her insulin dosing.   Her medical ride did not show up for her US appointment, we rescheduled this to tomorrow at the [facility]. Once we hear the results of the US, she is interested in doing Unna boots to help the swelling go down until she can get her compression stockings. We will call her back once we hear on the US.  Steph Justice RN

## 2021-07-28 NOTE — PROGRESS NOTES
Past Medical History:   Diagnosis Date     A-fib (H) 2011    on coumadin since 1/13     Asthma     as a kid     Chest pain 2/1/2017     Chronic anticoagulation for a-fib 2/15/2013    INR's followed by coumadin clinic at      Diabetes mellitus (H) 2012     Diastolic heart failure 2/15/2013     Dilated cardiomyopathy (H) 1/8/2013     HTN (hypertension)      Hyperthyroidism     Graves, s/p I131 1/13, now on prednisone and methimazole     Morbid obesity (H)      Pulmonary embolism (H) 1/12    hospitalized in [place], on lovenox/coumadin for a few months but stopped, hypercoag w/u neg per pt     Sleep apnea     using CPAP     Patient Active Problem List   Diagnosis     Other chronic pulmonary heart diseases     Dilated cardiomyopathy (H)     Morbid obesity (H)     Diabetes mellitus without complication (H)     Chronic diastolic heart failure (H)     Chronic anticoagulation for a-fib     JYOTI (obstructive sleep apnea) CPAP Min Pressure of 13 and Max Pressure of 18     Hypothyroidism following radioiodine therapy     SOB (shortness of breath)     Azotemia     Asthma     Peritonsillar abscess     Plantar fasciitis     Neuropathic pain of lower extremity     Atrial fibrillation (H) [I48.91]     Long-term (current) use of anticoagulants [Z79.01]     Cellulitis     Chest pain     Foot pain     Class 3 obesity     Thyrotoxicosis     Respiratory abnormality     Personal history of tobacco use, presenting hazards to health     Healthcare maintenance     Cocaine abuse (H)     Acute on chronic systolic heart failure (H)     Other ill-defined heart diseases     No past surgical history on file.  Social History     Socioeconomic History     Marital status: Single     Spouse name: Not on file     Number of children: Not on file     Years of education: Not on file     Highest education level: Not on file   Occupational History     Not on file   Tobacco Use     Smoking status: Light Tobacco Smoker     Packs/day: 0.25     Years: 13.00      Pack years: 3.25     Types: Cigarettes     Smokeless tobacco: Never Used     Tobacco comment: down to 5 cigs   Substance and Sexual Activity     Alcohol use: No     Drug use: No     Sexual activity: Yes     Partners: Male     Birth control/protection: Condom   Other Topics Concern     Parent/sibling w/ CABG, MI or angioplasty before 65F 55M? Not Asked   Social History Narrative    Single, no children        Gyn:        Last pap several years ago, no abnormal    No STIs            Patient is single.  She is no longer working.  She used to work in the area of customer service.  She is currently living with her sister in [place] [employer], [place].  She has no pets.  Patient has smoked a half pack of cigarettes a day for the past 10 plus years.  She states that she is down to 5 cigarettes a day with the aid of Wellbutrin.  She does not smoke cigars, pipes or chew tobacco.  She has 1 cup of coffee in the morning.  She does not drink alcohol.  Patient does not exercise.      Social Determinants of Health     Financial Resource Strain:      Difficulty of Paying Living Expenses:    Food Insecurity:      Worried About Running Out of Food in the Last Year:      Ran Out of Food in the Last Year:    Transportation Needs:      Lack of Transportation (Medical):      Lack of Transportation (Non-Medical):    Physical Activity:      Days of Exercise per Week:      Minutes of Exercise per Session:    Stress:      Feeling of Stress :    Social Connections:      Frequency of Communication with Friends and Family:      Frequency of Social Gatherings with Friends and Family:      Attends Denominational Services:      Active Member of Clubs or Organizations:      Attends Club or Organization Meetings:      Marital Status:    Intimate Partner Violence:      Fear of Current or Ex-Partner:      Emotionally Abused:      Physically Abused:      Sexually Abused:      Family History   Problem Relation Age of Onset     Thyroid Disease Mother          Grave's D     Diabetes Mother      Heart Disease Mother      Cerebrovascular Disease Mother         dec. 54 yo     Hypertension Mother      Heart Disease Sister         Heart Murmur     Diabetes Sister      Heart Disease Father         dec in his 30s, MI     Psychotic Disorder Brother         Bipolar     Diabetes Brother      Thyroid Disease Brother         Hyper Thyroid     Heart Disease Brother      Thyroid Disease Sister         Hyper Thyroid     Thyroid Disease Sister         Hyper Thyroid     Thyroid Disease Sister         Mental Health Problems     Thyroid Disease Maternal Aunt      Thyroid Disease Maternal Uncle      Cancer No family hx of      Glaucoma No family hx of      Macular Degeneration No family hx of      Lab Results   Component Value Date    A1C >15.0 07/23/2021    A1C >14.0 08/19/2020    A1C 8.4 10/11/2018    A1C 9.6 05/29/2018    A1C 9.0 04/07/2017    A1C 12.1 03/10/2016     Last Comprehensive Metabolic Panel:  Sodium   Date Value Ref Range Status   07/23/2021 131 (L) 133 - 144 mmol/L Final   06/03/2021 130 (L) 133 - 144 mmol/L Final     Potassium   Date Value Ref Range Status   07/23/2021 4.2 3.4 - 5.3 mmol/L Final   06/03/2021 4.2 3.4 - 5.3 mmol/L Final     Chloride   Date Value Ref Range Status   07/23/2021 98 94 - 109 mmol/L Final   06/03/2021 98 94 - 109 mmol/L Final     Carbon Dioxide   Date Value Ref Range Status   06/03/2021 25 20 - 32 mmol/L Final     Carbon Dioxide (CO2)   Date Value Ref Range Status   07/23/2021 27 20 - 32 mmol/L Final     Anion Gap   Date Value Ref Range Status   07/23/2021 6 3 - 14 mmol/L Final   06/03/2021 7 3 - 14 mmol/L Final     Glucose   Date Value Ref Range Status   07/23/2021 483 (H) 70 - 99 mg/dL Final   06/03/2021 569 (HH) 70 - 99 mg/dL Final     Comment:     Critical Value called to and read back by  SHELBY IN PODIATRY AT 1128 ON 06.03.21 BY MP.       Urea Nitrogen   Date Value Ref Range Status   07/23/2021 13 7 - 30 mg/dL Final   06/03/2021 13 7 - 30  mg/dL Final     Creatinine   Date Value Ref Range Status   07/23/2021 0.87 0.52 - 1.04 mg/dL Final   06/03/2021 0.89 0.52 - 1.04 mg/dL Final     GFR Estimate   Date Value Ref Range Status   07/23/2021 80 >60 mL/min/1.73m2 Final     Comment:     As of July 11, 2021, eGFR is calculated by the CKD-EPI creatinine equation, without race adjustment. eGFR can be influenced by muscle mass, exercise, and diet. The reported eGFR is an estimation only and is only applicable if the renal function is stable.   06/03/2021 77 >60 mL/min/[1.73_m2] Final     Comment:     Non  GFR Calc  Starting 12/18/2018, serum creatinine based estimated GFR (eGFR) will be   calculated using the Chronic Kidney Disease Epidemiology Collaboration   (CKD-EPI) equation.       Calcium   Date Value Ref Range Status   07/23/2021 8.8 8.5 - 10.1 mg/dL Final   06/03/2021 8.7 8.5 - 10.1 mg/dL Final     Lab Results   Component Value Date    AST 8 07/23/2021    AST 13 08/19/2020     Lab Results   Component Value Date    ALT 23 07/23/2021    ALT 30 08/19/2020     Lab Results   Component Value Date    BILICONJ 0.0 10/17/2013      Lab Results   Component Value Date    BILITOTAL 0.5 07/23/2021    BILITOTAL 0.5 08/19/2020     Lab Results   Component Value Date    ALBUMIN 2.9 07/23/2021    ALBUMIN 2.9 08/19/2020     Lab Results   Component Value Date    PROTTOTAL 8.0 07/23/2021    PROTTOTAL 8.0 08/19/2020      Lab Results   Component Value Date    ALKPHOS 87 07/23/2021    ALKPHOS 73 08/19/2020     Uric Acid   Date Value Ref Range Status   06/03/2021 6.8 (H) 2.6 - 6.0 mg/dL Final     Lab Results   Component Value Date    WBC 8.5 06/03/2021     Lab Results   Component Value Date    RBC 5.54 06/03/2021     Lab Results   Component Value Date    HGB 16.7 06/03/2021     Lab Results   Component Value Date    HCT 50.9 06/03/2021     No components found for: MCT  Lab Results   Component Value Date    MCV 92 06/03/2021     Lab Results   Component Value Date     [facility] 30.1 06/03/2021   SUBJECTIVE FINDINGS:  A 46-year-old female returns to clinic for foot and leg pain and swelling.  She relates she was doing well until this weekend, and her leg started swelling up.  Now both of them are swollen.  They are painful in the back of the leg, the foot, the ankle.  She relates she took the medications previously, the Cipro, clindamycin and colchicine, and she felt those helped.  She felt the colchicine helped with the pain as well.  She relates no injuries.  She relates she did not get compression socks.  She has not seen Vascular yet or Rheumatology or Physical Therapy.    OBJECTIVE FINDINGS:  DP and PT are 2/4 bilaterally.  She has peripheral lymphedema bilaterally with pain on palpation of the calves bilaterally.  Pain on palpation of the foot and ankles bilaterally.  There are no gross tendon voids bilaterally.  There is no gross erythema bilaterally.  No gross calor bilaterally.  No drainage, no open lesions.    ASSESSMENT AND PLAN:  Foot and leg pain bilaterally with peripheral edema and lymphedema and venous stasis.  She is diabetic with peripheral neuropathy and vascular disease.  Gout.  She has arterial and venous disease.  Previous x-rays and labs reviewed as noted in the EMR.  On x-rays from 06/03/2021, she does have some arterial sclerosis noted.  Rule out gout and infection, less likely Charcot.  I ordered a venous Doppler to help rule out DVT.  I ordered labs to help rule out infection and gout.  Prescriptions for Physical Therapy and Lymphedema Clinic given and use discussed with her.  Prescription for compression socks given and use discussed with her.  She will return to clinic and see me in about 3 weeks.  Previous notes reviewed.  We used Colchicine previously this has potential interaction with statin for myopathy.  Medrol dose karolyn ordered and discussed in Epic with Dr. Wright its use with Diabetes.  Referrrals to Vascular and Rheumatology redone.        Lab  Results   Component Value Date    MCHC 32.8 06/03/2021     Lab Results   Component Value Date    RDW 13.3 06/03/2021     Lab Results   Component Value Date     06/03/2021     Lab Results   Component Value Date    SED 8 06/03/2021     Lab Results   Component Value Date    CRP 30.0 06/03/2021     Lab Results   Component Value Date    WBC 9.3 07/28/2021    WBC 8.5 06/03/2021     Lab Results   Component Value Date    RBC 5.67 07/28/2021    RBC 5.54 06/03/2021     Lab Results   Component Value Date    HGB 17.2 07/28/2021    HGB 16.7 06/03/2021     Lab Results   Component Value Date    HCT 53.0 07/28/2021    HCT 50.9 06/03/2021     No components found for: MCT  Lab Results   Component Value Date    MCV 94 07/28/2021    MCV 92 06/03/2021     Lab Results   Component Value Date    MCH 30.3 07/28/2021    MCH 30.1 06/03/2021     Lab Results   Component Value Date    MCHC 32.5 07/28/2021    MCHC 32.8 06/03/2021     Lab Results   Component Value Date    RDW 13.3 07/28/2021    RDW 13.3 06/03/2021     Lab Results   Component Value Date     07/28/2021     06/03/2021       Uric Acid   Date Value Ref Range Status   07/28/2021 7.3 (H) 2.6 - 6.0 mg/dL Final   06/03/2021 6.8 (H) 2.6 - 6.0 mg/dL Final     Lab Results   Component Value Date    SED 8 07/28/2021    SED 8 06/03/2021     Lab Results   Component Value Date    CRP 26.8 07/28/2021    CRP 30.0 06/03/2021     High level of medical decision making with Number and Complexity of  Problems Addressed- high,  Amount and/or Complexity of Data to be Reviewed  and Analyzed- extensive and the Risk of Complications and/or  Morbidity or Mortality of  Patient Management- moderate.

## 2021-07-28 NOTE — NURSING NOTE
Pricilla Brown's chief complaint for this visit includes:  Chief Complaint   Patient presents with     RECHECK     Follow up bilateral foot care      PCP: Lenore Roberts    Referring Provider:  No referring provider defined for this encounter.    There were no vitals taken for this visit.  Data Unavailable     Do you need any medication refills at today's visit? no

## 2021-07-28 NOTE — LETTER
7/28/2021         RE: Pricilla Brown  [address]  [facility] [address]        Dear Colleague,    Thank you for referring your patient, Pricilla Brown, to the [facility]. Please see a copy of my visit note below.    Past Medical History:   Diagnosis Date     A-fib (H) 2011    on coumadin since 1/13     Asthma     as a kid     Chest pain 2/1/2017     Chronic anticoagulation for a-fib 2/15/2013    INR's followed by coumadin clinic at      Diabetes mellitus (H) 2012     Diastolic heart failure 2/15/2013     Dilated cardiomyopathy (H) 1/8/2013     HTN (hypertension)      Hyperthyroidism     Graves, s/p I131 1/13, now on prednisone and methimazole     Morbid obesity (H)      Pulmonary embolism (H) 1/12    hospitalized in [place], on lovenox/coumadin for a few months but stopped, hypercoag w/u neg per pt     Sleep apnea     using CPAP     Patient Active Problem List   Diagnosis     Other chronic pulmonary heart diseases     Dilated cardiomyopathy (H)     Morbid obesity (H)     Diabetes mellitus without complication (H)     Chronic diastolic heart failure (H)     Chronic anticoagulation for a-fib     JYOTI (obstructive sleep apnea) CPAP Min Pressure of 13 and Max Pressure of 18     Hypothyroidism following radioiodine therapy     SOB (shortness of breath)     Azotemia     Asthma     Peritonsillar abscess     Plantar fasciitis     Neuropathic pain of lower extremity     Atrial fibrillation (H) [I48.91]     Long-term (current) use of anticoagulants [Z79.01]     Cellulitis     Chest pain     Foot pain     Class 3 obesity     Thyrotoxicosis     Respiratory abnormality     Personal history of tobacco use, presenting hazards to health     Healthcare maintenance     Cocaine abuse (H)     Acute on chronic systolic heart failure (H)     Other ill-defined heart diseases     No past surgical history on file.  Social History     Socioeconomic History     Marital status: Single     Spouse name:  Not on file     Number of children: Not on file     Years of education: Not on file     Highest education level: Not on file   Occupational History     Not on file   Tobacco Use     Smoking status: Light Tobacco Smoker     Packs/day: 0.25     Years: 13.00     Pack years: 3.25     Types: Cigarettes     Smokeless tobacco: Never Used     Tobacco comment: down to 5 cigs   Substance and Sexual Activity     Alcohol use: No     Drug use: No     Sexual activity: Yes     Partners: Male     Birth control/protection: Condom   Other Topics Concern     Parent/sibling w/ CABG, MI or angioplasty before 65F 55M? Not Asked   Social History Narrative    Single, no children        Gyn:        Last pap several years ago, no abnormal    No STIs            Patient is single.  She is no longer working.  She used to work in the area of customer service.  She is currently living with her sister in [place], [place].  She has no pets.  Patient has smoked a half pack of cigarettes a day for the past 10 plus years.  She states that she is down to 5 cigarettes a day with the aid of Wellbutrin.  She does not smoke cigars, pipes or chew tobacco.  She has 1 cup of coffee in the morning.  She does not drink alcohol.  Patient does not exercise.      Social Determinants of Health     Financial Resource Strain:      Difficulty of Paying Living Expenses:    Food Insecurity:      Worried About Running Out of Food in the Last Year:      Ran Out of Food in the Last Year:    Transportation Needs:      Lack of Transportation (Medical):      Lack of Transportation (Non-Medical):    Physical Activity:      Days of Exercise per Week:      Minutes of Exercise per Session:    Stress:      Feeling of Stress :    Social Connections:      Frequency of Communication with Friends and Family:      Frequency of Social Gatherings with Friends and Family:      Attends Orthodox Services:      Active Member of Clubs or Organizations:      Attends Club or  Organization Meetings:      Marital Status:    Intimate Partner Violence:      Fear of Current or Ex-Partner:      Emotionally Abused:      Physically Abused:      Sexually Abused:      Family History   Problem Relation Age of Onset     Thyroid Disease Mother         Grave's D     Diabetes Mother      Heart Disease Mother      Cerebrovascular Disease Mother         dec. 54 yo     Hypertension Mother      Heart Disease Sister         Heart Murmur     Diabetes Sister      Heart Disease Father         dec in his 30s, MI     Psychotic Disorder Brother         Bipolar     Diabetes Brother      Thyroid Disease Brother         Hyper Thyroid     Heart Disease Brother      Thyroid Disease Sister         Hyper Thyroid     Thyroid Disease Sister         Hyper Thyroid     Thyroid Disease Sister         Mental Health Problems     Thyroid Disease Maternal Aunt      Thyroid Disease Maternal Uncle      Cancer No family hx of      Glaucoma No family hx of      Macular Degeneration No family hx of      Lab Results   Component Value Date    A1C >15.0 07/23/2021    A1C >14.0 08/19/2020    A1C 8.4 10/11/2018    A1C 9.6 05/29/2018    A1C 9.0 04/07/2017    A1C 12.1 03/10/2016     Last Comprehensive Metabolic Panel:  Sodium   Date Value Ref Range Status   07/23/2021 131 (L) 133 - 144 mmol/L Final   06/03/2021 130 (L) 133 - 144 mmol/L Final     Potassium   Date Value Ref Range Status   07/23/2021 4.2 3.4 - 5.3 mmol/L Final   06/03/2021 4.2 3.4 - 5.3 mmol/L Final     Chloride   Date Value Ref Range Status   07/23/2021 98 94 - 109 mmol/L Final   06/03/2021 98 94 - 109 mmol/L Final     Carbon Dioxide   Date Value Ref Range Status   06/03/2021 25 20 - 32 mmol/L Final     Carbon Dioxide (CO2)   Date Value Ref Range Status   07/23/2021 27 20 - 32 mmol/L Final     Anion Gap   Date Value Ref Range Status   07/23/2021 6 3 - 14 mmol/L Final   06/03/2021 7 3 - 14 mmol/L Final     Glucose   Date Value Ref Range Status   07/23/2021 483 (H) 70 - 99 mg/dL  Final   06/03/2021 569 (HH) 70 - 99 mg/dL Final     Comment:     Critical Value called to and read back by  SHELBY IN PODIATRY AT 1128 ON 06.03.21 BY SIVA.       Urea Nitrogen   Date Value Ref Range Status   07/23/2021 13 7 - 30 mg/dL Final   06/03/2021 13 7 - 30 mg/dL Final     Creatinine   Date Value Ref Range Status   07/23/2021 0.87 0.52 - 1.04 mg/dL Final   06/03/2021 0.89 0.52 - 1.04 mg/dL Final     GFR Estimate   Date Value Ref Range Status   07/23/2021 80 >60 mL/min/1.73m2 Final     Comment:     As of July 11, 2021, eGFR is calculated by the CKD-EPI creatinine equation, without race adjustment. eGFR can be influenced by muscle mass, exercise, and diet. The reported eGFR is an estimation only and is only applicable if the renal function is stable.   06/03/2021 77 >60 mL/min/[1.73_m2] Final     Comment:     Non  GFR Calc  Starting 12/18/2018, serum creatinine based estimated GFR (eGFR) will be   calculated using the Chronic Kidney Disease Epidemiology Collaboration   (CKD-EPI) equation.       Calcium   Date Value Ref Range Status   07/23/2021 8.8 8.5 - 10.1 mg/dL Final   06/03/2021 8.7 8.5 - 10.1 mg/dL Final     Lab Results   Component Value Date    AST 8 07/23/2021    AST 13 08/19/2020     Lab Results   Component Value Date    ALT 23 07/23/2021    ALT 30 08/19/2020     Lab Results   Component Value Date    BILICONJ 0.0 10/17/2013      Lab Results   Component Value Date    BILITOTAL 0.5 07/23/2021    BILITOTAL 0.5 08/19/2020     Lab Results   Component Value Date    ALBUMIN 2.9 07/23/2021    ALBUMIN 2.9 08/19/2020     Lab Results   Component Value Date    PROTTOTAL 8.0 07/23/2021    PROTTOTAL 8.0 08/19/2020      Lab Results   Component Value Date    ALKPHOS 87 07/23/2021    ALKPHOS 73 08/19/2020     Uric Acid   Date Value Ref Range Status   06/03/2021 6.8 (H) 2.6 - 6.0 mg/dL Final     Lab Results   Component Value Date    WBC 8.5 06/03/2021     Lab Results   Component Value Date    RBC 5.54  06/03/2021     Lab Results   Component Value Date    HGB 16.7 06/03/2021     Lab Results   Component Value Date    HCT 50.9 06/03/2021     No components found for: MCT  Lab Results   Component Value Date    MCV 92 06/03/2021     Lab Results   Component Value Date    MCH 30.1 06/03/2021   SUBJECTIVE FINDINGS:  A 46-year-old female returns to clinic for foot and leg pain and swelling.  She relates she was doing well until this weekend, and her leg started swelling up.  Now both of them are swollen.  They are painful in the back of the leg, the foot, the ankle.  She relates she took the medications previously, the Cipro, clindamycin and colchicine, and she felt those helped.  She felt the colchicine helped with the pain as well.  She relates no injuries.  She relates she did not get compression socks.  She has not seen Vascular yet or Rheumatology or Physical Therapy.    OBJECTIVE FINDINGS:  DP and PT are 2/4 bilaterally.  She has peripheral lymphedema bilaterally with pain on palpation of the calves bilaterally.  Pain on palpation of the foot and ankles bilaterally.  There are no gross tendon voids bilaterally.  There is no gross erythema bilaterally.  No gross calor bilaterally.  No drainage, no open lesions.    ASSESSMENT AND PLAN:  Foot and leg pain bilaterally with peripheral edema and lymphedema and venous stasis.  She is diabetic with peripheral neuropathy and vascular disease.  Gout.  She has arterial and venous disease.  Previous x-rays and labs reviewed as noted in the EMR.  On x-rays from 06/03/2021, she does have some arterial sclerosis noted.  Rule out gout and infection, less likely Charcot.  I ordered a venous Doppler to help rule out DVT.  I ordered labs to help rule out infection and gout.  Prescriptions for Physical Therapy and Lymphedema Clinic given and use discussed with her.  Prescription for compression socks given and use discussed with her.  She will return to clinic and see me in about 3  weeks.  Previous notes reviewed.  We used Colchicine previously this has potential interaction with statin for myopathy.  Medrol dose karolyn ordered and discussed in Epic with Dr. Wright its use with Diabetes.  Referrrals to Vascular and Rheumatology redone.        Lab Results   Component Value Date    MCHC 32.8 06/03/2021     Lab Results   Component Value Date    RDW 13.3 06/03/2021     Lab Results   Component Value Date     06/03/2021     Lab Results   Component Value Date    SED 8 06/03/2021     Lab Results   Component Value Date    CRP 30.0 06/03/2021     Lab Results   Component Value Date    WBC 9.3 07/28/2021    WBC 8.5 06/03/2021     Lab Results   Component Value Date    RBC 5.67 07/28/2021    RBC 5.54 06/03/2021     Lab Results   Component Value Date    HGB 17.2 07/28/2021    HGB 16.7 06/03/2021     Lab Results   Component Value Date    HCT 53.0 07/28/2021    HCT 50.9 06/03/2021     No components found for: MCT  Lab Results   Component Value Date    MCV 94 07/28/2021    MCV 92 06/03/2021     Lab Results   Component Value Date    MCH 30.3 07/28/2021    MCH 30.1 06/03/2021     Lab Results   Component Value Date    MCHC 32.5 07/28/2021    MCHC 32.8 06/03/2021     Lab Results   Component Value Date    RDW 13.3 07/28/2021    RDW 13.3 06/03/2021     Lab Results   Component Value Date     07/28/2021     06/03/2021       Uric Acid   Date Value Ref Range Status   07/28/2021 7.3 (H) 2.6 - 6.0 mg/dL Final   06/03/2021 6.8 (H) 2.6 - 6.0 mg/dL Final     Lab Results   Component Value Date    SED 8 07/28/2021    SED 8 06/03/2021     Lab Results   Component Value Date    CRP 26.8 07/28/2021    CRP 30.0 06/03/2021     High level of medical decision making with Number and Complexity of  Problems Addressed- high,  Amount and/or Complexity of Data to be Reviewed  and Analyzed- extensive and the Risk of Complications and/or  Morbidity or Mortality of  Patient Management- moderate.    Called and talked with  patient about lab results and medications. Patient expressed understanding about changing to Medrol dose pack and would like this sent to Lakisha Webb. She has not heard from endocrine yet about her insulin dosing.   Her medical ride did not show up for her US appointment, we rescheduled this to tomorrow at the [facility]. Once we hear the results of the US, she is interested in doing Unna boots to help the swelling go down until she can get her compression stockings. We will call her back once we hear on the US.  Steph Justice RN        Again, thank you for allowing me to participate in the care of your patient.        Sincerely,        Norman Jean DPM

## 2021-07-29 RX ORDER — METHYLPREDNISOLONE 4 MG
TABLET, DOSE PACK ORAL
Qty: 21 TABLET | Refills: 0 | Status: SHIPPED | OUTPATIENT
Start: 2021-07-29 | End: 2022-01-01

## 2021-07-29 NOTE — TELEPHONE ENCOUNTER
Diabetes Education Scheduling Outreach #2:    Call to patient to schedule. Left message with phone number to call to schedule.    Pricilla Samuel  [facility]  Diabetes and Nutrition Scheduling

## 2021-08-03 NOTE — TELEPHONE ENCOUNTER
RECORDS RECEIVED FROM: Internal   DATE RECEIVED: 09.29.2021   NOTES STATUS DETAILS   OFFICE NOTE from referring provider Internal 07.29.2021 Vladislav Wright MD   OFFICE NOTE from other specialist  N/A    *Only VASCULITIS or LUPUS gather office notes for the following N/A    *PULMONARY   N/A    *ENT N/A    *DERMATOLOGY N/A    *RHEUMATOLOGY N/A    DISCHARGE SUMMARY from hospital N/A    DISCHARGE REPORT from the ER N/A    MEDICATION LIST Internal / CE    IMAGING  (NEED IMAGES AND REPORTS)     KIDNEY CT SCAN N/A    KIDNEY ULTRASOUND N/A    MR ABDOMEN N/A    NUCLEAR MEDICINE RENAL N/A    LABS     CBC Internal 07.28.2021   CMP Internal 07.28.2021   BMP Internal 07.23.2021   UA N/A    URINE PROTEIN N/A    RENAL PANEL N/A    BIOPSY     KIDNEY BIOPSY  N/A

## 2021-08-04 ENCOUNTER — TELEPHONE (OUTPATIENT)
Dept: ENDOCRINOLOGY | Facility: CLINIC | Age: 46
End: 2021-08-04

## 2021-08-04 NOTE — TELEPHONE ENCOUNTER
----- Message from Vladislav Wright MD sent at 7/28/2021  2:21 PM CDT -----  I will ask my team to contact her. Ok to use medrol dose pack.     Layton,   Once medrol dose pack started, recommend patient increase all her insulin doses by 20%.   Toujeo 160 U every day -->192 U every day  Novolog 100 U with meals --> 120 U with meals.     Stop once medrol dose pack finished.     Vladislav Wright MD on 7/28/2021 at 2:24 PM    ----- Message -----  From: Norman Jean DPM  Sent: 7/28/2021   1:07 PM CDT  To: MD Alejandro Goldsmithry, now attatched.  Alejandro.  ----- Message -----  From: Vladislav Wright MD  Sent: 7/28/2021  12:43 PM CDT  To: Norman Jean DPM    I am sorry but this message is not attached to a patient file so I am not who you are referring to. Please send me the MRNLester [place]   ----- Message -----  From: Norman Jean DPM  Sent: 7/28/2021  11:22 AM CDT  To: MD Dr. Kyle Goldsmith, I seen Pricilla today for foot and leg pain and this appears to be Gout, we had used Colchicine in the past but there is potential interaction with statins, so plan was to use Medrol dose pack refer to Rhuematology.  Any thoughts on using this with her blood sugar elevations?  She is having a lot of pain and has an allergy to Ibuprofen.  Let me know any thoughts.  Alejandro.

## 2021-08-04 NOTE — TELEPHONE ENCOUNTER
Called and informed patient of message below. Patient will increase insulin doses while on Medrol dose pack and will go back to normal doses once she completes Medrol course.    Layton CARDONA RN....8/4/2021 3:50 PM

## 2021-08-05 ENCOUNTER — TELEPHONE (OUTPATIENT)
Dept: ENDOCRINOLOGY | Facility: CLINIC | Age: 46
End: 2021-08-05

## 2021-08-05 NOTE — TELEPHONE ENCOUNTER
Hello,    We are needing a completed CMN by MD Wright in order to move forward. Form can be found and downloaded in link below. Please fax to number provided after completion.     CMN: [email or web address]    Thank you!  DCS Team  PH: [phone]  FAX: [phone]

## 2021-08-05 NOTE — TELEPHONE ENCOUNTER
Signed, completed form faxed to:   DCS Team  Fax #: [phone]    Copy in folder, sent to scan Denise Behrendt  Specialty CSS

## 2021-08-06 DIAGNOSIS — E11.65 TYPE 2 DIABETES MELLITUS WITH HYPERGLYCEMIA, WITH LONG-TERM CURRENT USE OF INSULIN (H): ICD-10-CM

## 2021-08-06 DIAGNOSIS — Z79.4 TYPE 2 DIABETES MELLITUS WITH HYPERGLYCEMIA, WITH LONG-TERM CURRENT USE OF INSULIN (H): ICD-10-CM

## 2021-08-06 RX ORDER — FLASH GLUCOSE SCANNING READER
EACH MISCELLANEOUS
Qty: 1 EACH | Refills: 0 | Status: SHIPPED | OUTPATIENT
Start: 2021-08-06 | End: 2021-08-10

## 2021-08-06 NOTE — PROGRESS NOTES
Refill sent to [facility] off [address].    Jamilah LNI RN Specialty Triage 8/6/2021 11:23 AM

## 2021-08-10 DIAGNOSIS — E11.65 TYPE 2 DIABETES MELLITUS WITH HYPERGLYCEMIA, WITH LONG-TERM CURRENT USE OF INSULIN (H): ICD-10-CM

## 2021-08-10 DIAGNOSIS — Z79.4 TYPE 2 DIABETES MELLITUS WITH HYPERGLYCEMIA, WITH LONG-TERM CURRENT USE OF INSULIN (H): ICD-10-CM

## 2021-08-10 RX ORDER — FLASH GLUCOSE SCANNING READER
EACH MISCELLANEOUS
Qty: 1 EACH | Refills: 0 | Status: SHIPPED | OUTPATIENT
Start: 2021-08-10 | End: 2022-01-01

## 2021-08-13 ENCOUNTER — MEDICAL CORRESPONDENCE (OUTPATIENT)
Dept: HEALTH INFORMATION MANAGEMENT | Facility: CLINIC | Age: 46
End: 2021-08-13

## 2021-08-24 DIAGNOSIS — I50.32 CHRONIC DIASTOLIC HEART FAILURE (H): Primary | ICD-10-CM

## 2021-08-28 DIAGNOSIS — Z79.4 TYPE 2 DIABETES MELLITUS WITH HYPERGLYCEMIA, WITH LONG-TERM CURRENT USE OF INSULIN (H): ICD-10-CM

## 2021-08-28 DIAGNOSIS — E11.65 TYPE 2 DIABETES MELLITUS WITH HYPERGLYCEMIA, WITH LONG-TERM CURRENT USE OF INSULIN (H): ICD-10-CM

## 2021-08-28 RX ORDER — INSULIN ASPART 100 [IU]/ML
INJECTION, SOLUTION INTRAVENOUS; SUBCUTANEOUS
Refills: 3 | OUTPATIENT
Start: 2021-08-28

## 2021-08-28 NOTE — TELEPHONE ENCOUNTER
insulin aspart (NOVOLOG FLEXPEN) 100 UNIT/ML pen  Last Written Prescription Date:  10/7/20  Last Fill Quantity: 250ml,   # refills: 3  Last Office Visit : 7/23/21  Future Office visit:  none    Routing refill request to provider for review/approval because: endo triage to fill    Asked pharm to send to Steve Marino last seen by Kyle. Lm on pharm  [address]

## 2021-08-30 DIAGNOSIS — E11.65 TYPE 2 DIABETES MELLITUS WITH HYPERGLYCEMIA, WITH LONG-TERM CURRENT USE OF INSULIN (H): ICD-10-CM

## 2021-08-30 DIAGNOSIS — Z79.4 TYPE 2 DIABETES MELLITUS WITH HYPERGLYCEMIA, WITH LONG-TERM CURRENT USE OF INSULIN (H): ICD-10-CM

## 2021-08-30 RX ORDER — INSULIN ASPART 100 [IU]/ML
INJECTION, SOLUTION INTRAVENOUS; SUBCUTANEOUS
Qty: 250 ML | Refills: 3 | Status: SHIPPED | OUTPATIENT
Start: 2021-08-30 | End: 2022-01-01

## 2021-08-30 RX ORDER — INSULIN ASPART 100 [IU]/ML
INJECTION, SOLUTION INTRAVENOUS; SUBCUTANEOUS
Refills: 3 | OUTPATIENT
Start: 2021-08-30

## 2021-08-30 NOTE — TELEPHONE ENCOUNTER
NOVOLOG FLEXPEN 100UNIT/ML SOPN   Last Written Prescription Date:  10/7/2020  Last Fill Quantity: 250,   # refills: 3  Last Office Visit : 7/23/2021  Future Office visit:  None    Routing refill request to provider for review/approval because:  Med not covered under refill protocol  Send to clinic for review       Ashley Haynes RN  Central Triage Red Flags/Med Refills

## 2021-09-03 ENCOUNTER — TELEPHONE (OUTPATIENT)
Dept: ENDOCRINOLOGY | Facility: CLINIC | Age: 46
End: 2021-09-03

## 2021-09-03 NOTE — TELEPHONE ENCOUNTER
PA Initiation    Medication: insulin glargine U-300 (TOUJEO SOLOSTAR) 300 UNIT/ML (1 units dial) pen   Insurance Company: [employer] Part D - Phone [phone] Fax [phone]  Pharmacy Filling the Rx: [facility] [place], [place] - [address] [place] [address] [id number]  Filling Pharmacy Phone: [phone]  Filling Pharmacy Fax: [phone]  Start Date: 9/3/2021

## 2021-09-03 NOTE — TELEPHONE ENCOUNTER
[facility] Prior Authorization Team Request    Medication: Toujeo 300 units/ml   Dosing: Inject 225 units under the skin every morning.   Qty: 67.5  Day Supply: 90  NDC (required for Medicaid members): [address]-30     Insurance   BIN: [id number]  PCN: [id number]  Grp: [id number]  ID: [id number]    CoverMyMeds Key (if applicable):     Additional documentation:       Filling Pharmacy: [facility]  [facility]armacy  Phone Number: [phone]  Contact:  Pedro Keyes NPI (required for Medicaid members): [id number]

## 2021-09-03 NOTE — TELEPHONE ENCOUNTER
Prior Authorization Approval    Authorization Effective Date: 6/5/2021  Authorization Expiration Date: 9/3/2022  Medication: insulin glargine U-300 (TOUJEO SOLOSTAR) 300 UNIT/ML (1 units dial) pen--APPROVED  Approved Dose/Quantity:   Reference #:     Insurance Company: [employer] Part D - Phone [phone] Fax [phone]  Expected CoPay:       CoPay Card Available:      Foundation Assistance Needed:    Which Pharmacy is filling the prescription (Not needed for infusion/clinic administered): [facility] [place], [place] - [address] [place] [address] [phone]  Pharmacy Notified: Yes  Patient Notified: Yes **Instructed pharmacy to notify patient when script is ready to /ship.**

## 2021-09-08 ENCOUNTER — TELEPHONE (OUTPATIENT)
Dept: FAMILY MEDICINE | Facility: CLINIC | Age: 46
End: 2021-09-08

## 2021-09-08 NOTE — TELEPHONE ENCOUNTER
"Attempted to reach patient this morning to remind her of appointment with  ([facility]) tomorrow, Thursday 9/9/21 @ 9:20a.m. I left a message on her home/cell number and \"other\" number she has [phone]. I will try again today to reach patient.   When appointment was made, I explained to the patient that if she missed appointment, I will no longer be able to schedule her. Patient has no showed 2 times and cancelled 4 times. Patient understood.  "

## 2021-09-08 NOTE — TELEPHONE ENCOUNTER
3:13p.m Attempted to reach patient again to remind her of tomorrows appointment with . I again left messages on both numbers listed in demographics. I will track to see if patient arrives tomorrow.    Alesia Mcmullen  Care Coordinator

## 2021-09-09 ENCOUNTER — OFFICE VISIT (OUTPATIENT)
Dept: FAMILY MEDICINE | Facility: CLINIC | Age: 46
End: 2021-09-09
Payer: MEDICARE

## 2021-09-09 VITALS
WEIGHT: 293 LBS | SYSTOLIC BLOOD PRESSURE: 146 MMHG | DIASTOLIC BLOOD PRESSURE: 97 MMHG | TEMPERATURE: 99.2 F | HEART RATE: 93 BPM | RESPIRATION RATE: 16 BRPM | BODY MASS INDEX: 45.99 KG/M2 | HEIGHT: 67 IN | OXYGEN SATURATION: 98 %

## 2021-09-09 DIAGNOSIS — R06.01 ORTHOPNEA: ICD-10-CM

## 2021-09-09 DIAGNOSIS — I48.0 PAROXYSMAL ATRIAL FIBRILLATION (H): ICD-10-CM

## 2021-09-09 DIAGNOSIS — I10 ESSENTIAL HYPERTENSION: ICD-10-CM

## 2021-09-09 DIAGNOSIS — I50.32 CHRONIC DIASTOLIC HEART FAILURE (H): ICD-10-CM

## 2021-09-09 DIAGNOSIS — Z76.89 ENCOUNTER TO ESTABLISH CARE: Primary | ICD-10-CM

## 2021-09-09 DIAGNOSIS — G47.33 OSA (OBSTRUCTIVE SLEEP APNEA): ICD-10-CM

## 2021-09-09 DIAGNOSIS — Z00.00 ENCOUNTER FOR PREVENTIVE HEALTH EXAMINATION: ICD-10-CM

## 2021-09-09 DIAGNOSIS — E03.9 HYPOTHYROIDISM, UNSPECIFIED TYPE: ICD-10-CM

## 2021-09-09 DIAGNOSIS — Z87.891 PERSONAL HISTORY OF TOBACCO USE, PRESENTING HAZARDS TO HEALTH: ICD-10-CM

## 2021-09-09 DIAGNOSIS — R00.2 PALPITATIONS: ICD-10-CM

## 2021-09-09 LAB
ANION GAP SERPL CALCULATED.3IONS-SCNC: 10 MMOL/L (ref 3–14)
BUN SERPL-MCNC: 18 MG/DL (ref 7–30)
CALCIUM SERPL-MCNC: 9.2 MG/DL (ref 8.5–10.1)
CHLORIDE BLD-SCNC: 98 MMOL/L (ref 94–109)
CO2 SERPL-SCNC: 28 MMOL/L (ref 20–32)
CREAT SERPL-MCNC: 0.97 MG/DL (ref 0.52–1.04)
GFR SERPL CREATININE-BSD FRML MDRD: 70 ML/MIN/1.73M2
GLUCOSE BLD-MCNC: 168 MG/DL (ref 70–99)
HCV AB SERPL QL IA: NONREACTIVE
HIV 1+2 AB+HIV1 P24 AG SERPL QL IA: NONREACTIVE
MAGNESIUM SERPL-MCNC: 1.6 MG/DL (ref 1.6–2.3)
POTASSIUM BLD-SCNC: 3.7 MMOL/L (ref 3.4–5.3)
SODIUM SERPL-SCNC: 136 MMOL/L (ref 133–144)
TSH SERPL DL<=0.005 MIU/L-ACNC: 6.79 MU/L (ref 0.4–4)

## 2021-09-09 PROCEDURE — 80048 BASIC METABOLIC PNL TOTAL CA: CPT | Performed by: STUDENT IN AN ORGANIZED HEALTH CARE EDUCATION/TRAINING PROGRAM

## 2021-09-09 PROCEDURE — 86803 HEPATITIS C AB TEST: CPT | Performed by: STUDENT IN AN ORGANIZED HEALTH CARE EDUCATION/TRAINING PROGRAM

## 2021-09-09 PROCEDURE — 84443 ASSAY THYROID STIM HORMONE: CPT | Performed by: STUDENT IN AN ORGANIZED HEALTH CARE EDUCATION/TRAINING PROGRAM

## 2021-09-09 PROCEDURE — 83735 ASSAY OF MAGNESIUM: CPT | Performed by: STUDENT IN AN ORGANIZED HEALTH CARE EDUCATION/TRAINING PROGRAM

## 2021-09-09 PROCEDURE — 87389 HIV-1 AG W/HIV-1&-2 AB AG IA: CPT | Performed by: STUDENT IN AN ORGANIZED HEALTH CARE EDUCATION/TRAINING PROGRAM

## 2021-09-09 PROCEDURE — 36415 COLL VENOUS BLD VENIPUNCTURE: CPT | Performed by: STUDENT IN AN ORGANIZED HEALTH CARE EDUCATION/TRAINING PROGRAM

## 2021-09-09 PROCEDURE — 99204 OFFICE O/P NEW MOD 45 MIN: CPT | Performed by: STUDENT IN AN ORGANIZED HEALTH CARE EDUCATION/TRAINING PROGRAM

## 2021-09-09 RX ORDER — SPIRONOLACTONE 50 MG/1
50 TABLET, FILM COATED ORAL DAILY
Qty: 90 TABLET | Refills: 0 | Status: SHIPPED | OUTPATIENT
Start: 2021-09-09 | End: 2022-01-01 | Stop reason: SINTOL

## 2021-09-09 RX ORDER — ISOSORBIDE DINITRATE 20 MG/1
20 TABLET ORAL 3 TIMES DAILY
Qty: 270 TABLET | Refills: 0 | Status: SHIPPED | OUTPATIENT
Start: 2021-09-09 | End: 2022-01-01

## 2021-09-09 ASSESSMENT — MIFFLIN-ST. JEOR: SCORE: 2633.93

## 2021-09-09 NOTE — Clinical Note
Hi Cardiology team,    This patient established care with me today - she is motivated to work on health goals and is following up in Cardiology [facility] later this month with you, Kristen.     She describes more frequent palpitations c/w a fib episodes, still brief overall, and more frequent orthopnea. I'm getting labs to rule out other causes. Vitals looked great and lungs were clear. I'm restarting spironolactone and Isordil and discussed restarting anticoagulation but didn't do that today.     Given that her sx today were mostly cardiac, I wanted her on your radar for her upcoming appt and also wanted to see if you feel like an updated echo would be helpful prior to her appt or if you'd rather evaluate her first and go from there? Thanks for weighing in when you have a chance.     Warmly,  Lenore Roberts, DO

## 2021-09-09 NOTE — PROGRESS NOTES
Assessment & Plan     Encounter to establish care  Welcomed and oriented to Misty. Appreciates Care Coordination assistance in making it to appointments.   - need to delve further into med rec at next visit     Paroxysmal atrial fibrillation (H)  Orthopnea  Palpitations  Chronic diastolic heart failure (H)  Describes more frequent palpitations consistent with past episodes of atrial fibrillation. Also new orthopnea. RRR and lungs clear with no tachycardia here. Reports good compliance with CPAP. Low concern for severe HF exacerbation with clear lungs and no hypoxia, and not in a fib at this time. Harder to eval fluid status by weight because of intentional weight loss. Already on metoprolol 200mg daily for rate control. Cause for more frequent episodes unclear - HF exacerbation, uncontrolled HTN, uncontrolled DM, electrolyte derangement, abnormal thyroid function all on the differential. Will get labs and restart spironolactone and Isordil for HTN and diuresis. Has upcoming Cardiology visit - will message and see if TTE prior to visit would be of benefit. We reviewed ED precautions in detail.     Also discussed anticoagulation. [id number] 4. Was on warfarin in the past but discontinued due to poor INR follow up. Interested in resuming. Discussed that still much more data for warfarin than DOAC in BMI >40 but some data to support rivaroxaban in BMI >40 especially if follow up antiXa level to confirm being absorbed. Would consider this a possibly safe option given Pricilla's struggles with follow up. Given info and will readdress next month.   - spironolactone (ALDACTONE) 50 MG tablet  Dispense: 90 tablet; Refill: 0  - Basic metabolic panel  - continue bumetanide 5mg BID   - continue KCl supplement     Hypothyroidism, unspecified type  Long overdue for TSH recheck. Good med compliance.   - TSH  - TSH    Essential hypertension  Uncontrolled. Goal <130/80. Not on ACEI or ARB - chart review shows she possibly had an  angioedema response to lisinopril in 2016. Has never been on ARB. Not discussed.   - resume spironolactone 50mg daily   - continue K supplementation, may need to decrease  - resume isosorbide dinitrate 20mg TID   - will recommend ARB at next visit     BMI 60.0-69.9, adult (H)  Commended on weight loss and habit changes! Requests resuming care at Weight Mgmt clinic. I emphasized importance of good glucose control and heart health before any surgical intervention, but agree with reestablishing to start.   - Comprehensive Weight Management    Encounter for preventive health examination  Discussed teratogenicity of meds, importance of avoiding conception - unlikely w/ age and menstrual pattern but still possible. Strongly encouraged COVID-19 vaccine, pt contemplative. Given some info and return at any time for walk-in vaccine at our pharmacy.   - HIV Antigen Antibody Combo  - HCV Antibody w/Reflex to HCV RNA  - continue to address at future visits     JYOTI (obstructive sleep apnea) CPAP Min Pressure of 13 and Max Pressure of 18  On CPAP, compliant. If no other cause for above sx identified, consider return to sleep med to eval CPAP settings.     Personal history of tobacco use, presenting hazards to health  Not discussed today.     DM type 2  Uncontrolled. Following with Endocrine         Diagnosis or treatment significantly limited by social determinants of health - at risk for healthcare bias based on racial identity and BMI  Ordering of each unique test  Prescription drug management  45 minutes spent on the date of the encounter doing chart review, history and exam, documentation and further activities per the note     Tobacco Cessation:   reports that she has been smoking cigarettes. She has a 3.25 pack-year smoking history. She has never used smokeless tobacco.  Tobacco Cessation Action Plan: precontemplateive    BMI:   Estimated body mass index is 67.17 kg/m  as calculated from the following:    Height as of this  "encounter: 1.709 m (5' 7.28\").    Weight as of this encounter: 196.2 kg (432 lb 8 oz).   Weight management plan: Patient referred to endocrine and/or weight management specialty      Return in about 4 weeks (around 10/7/2021) for Follow up, with me.    Lenore Roberts DO  [facility] MAURY Pleitez is a 46 year old with type 2 DM, cardiomyopathy, paroxysmal a fib not currently on anticoagulation, HTN, asthma, severe obesity, JYOTI, hypothyroidism, who presents for the following health issues      Chief Complaint   Patient presents with     Establish Care     Pt. establish care.      HPI     Establish care     Biggest concerns: going in and out of a fib, feels palpitations. Happening more often and lasting longer. Sometimes a couple times a week. Before that only a couple times a month. Previously it would last a couple of minutes, now maybe 10-20 minutes at a time.   - sx gets palpitations, dyspnea     Diuretics: concerned. Lately at night feels like they're not working. When she lies down flat, dyspneic. Really only at night - lays down flat during the day. Sleeps upright during the day - has a lot of swelling during the day.   - still wearing CPAP at night even at night     Weight management: got discouraged. Was supposed to lose so much weight to do the surgery.   - has been trying somewhat for the diabetes. Changing what she was eating    - would like to get back in to the clinic       Social:   Lives with her boyfriend. Wants a dog - maybe getting one soon. On disability - for physical health, been on it since 2015-16.     Sexual health: irregular menses. Condoms sometimes, no other contraception.       Patient Active Problem List   Diagnosis     Other chronic pulmonary heart diseases     Dilated cardiomyopathy (H)     Morbid obesity (H)     Diabetes mellitus without complication (H)     Chronic diastolic heart failure (H)     Chronic anticoagulation for a-fib     JYOTI " "(obstructive sleep apnea) CPAP Min Pressure of 13 and Max Pressure of 18     Hypothyroidism following radioiodine therapy     SOB (shortness of breath)     Azotemia     Asthma     Peritonsillar abscess     Plantar fasciitis     Neuropathic pain of lower extremity     Atrial fibrillation (H) [I48.91]     Long-term (current) use of anticoagulants [Z79.01]     Cellulitis     Chest pain     Foot pain     Class 3 obesity     Thyrotoxicosis     Respiratory abnormality     Personal history of tobacco use, presenting hazards to health     Healthcare maintenance     Cocaine abuse (H)     Acute on chronic systolic heart failure (H)     Other ill-defined heart diseases     Essential hypertension        Review of Systems   Pertinent positives and negatives per HPI.        Objective    BP (!) 146/97   Pulse 93   Temp 99.2  F (37.3  C) (Oral)   Resp 16   Ht 1.709 m (5' 7.28\")   Wt (!) 196.2 kg (432 lb 8 oz)   SpO2 98%   BMI 67.17 kg/m    Body mass index is 67.17 kg/m .  Physical Exam   GENERAL: alert and no distress  RESP: lungs clear to auscultation - no rales, rhonchi or wheezes  CV: regular rate and rhythm, normal S1 S2, no S3 or S4, no murmur, click or rub, trace peripheral edema   MS: no gross musculoskeletal defects noted  NEURO: Normal strength and tone, mentation intact and speech normal  PSYCH: mentation appears normal, affect normal/bright    "

## 2021-09-09 NOTE — PATIENT INSTRUCTIONS
Patient Education   Here is the plan from today's visit    Cardiology - I will route them our visit and ask if they want an echocardiogram before you see them 9/22  Blood thinner - warfarin OR a new blood thinner (DOAC). I'll give you info.     Restart meds:   Isordil   Spironolactone     Labs today: Check thyroid levels today, HIV and hepatitis C screening     Weight Management referral     COVID vaccine: I REALLY think this is important to keep you safe and healthy. I will give you some info.       Please call or return to clinic if your symptoms don't go away.    Follow up plan  Return in about 4 weeks (around 10/7/2021) for Follow up, with me.    Thank you for coming to [facility] today.  COVID-19 Vaccine:  [facility] has walk-in appointments for COVID-19 vaccines. No appointment needed!   You also have the option of receiving Moderna vaccine during your physician appointment. Please ask your care team for more information!  Lab Testing:  **If you had lab testing today and your results are reassuring or normal they will be mailed to you or sent through [facility] within 7 days.   **If the lab tests need quick action we will call you with the results.  **If you are having labs done on a different day, please call [phone] to schedule at [facility] or [phone] for other [facility] locations.   The phone number we will call with results is # [phone] (home) [phone] (work). If this is not the best number please call our clinic and change the number.  Medication Refills:  If you need any refills please call your pharmacy and they will contact us.   If you need to  your refill at a new pharmacy, please contact the new pharmacy directly. The new pharmacy will help you get your medications transferred faster.   Scheduling:  If you have any concerns about today's visit or wish to schedule another appointment please call our office during normal business hours  [phone] (8-5:00 M-F)  If a referral was made to a [facility] and you don't get a call from central scheduling please call [phone].  If a Mammogram was ordered for you at [facility] call [phone] to schedule or change your appointment.  If you had an EKG/XRay/CT/Ultrasound/MRI ordered the number is [phone] to schedule or change your radiology appointment.   Medical Concerns:  If you have urgent medical concerns please call [phone] at any time of the day.    Lenore Roberts, DO

## 2021-09-12 ENCOUNTER — HEALTH MAINTENANCE LETTER (OUTPATIENT)
Age: 46
End: 2021-09-12

## 2021-09-13 ENCOUNTER — MYC MEDICAL ADVICE (OUTPATIENT)
Dept: FAMILY MEDICINE | Facility: CLINIC | Age: 46
End: 2021-09-13

## 2021-09-13 DIAGNOSIS — I50.32 CHRONIC DIASTOLIC HEART FAILURE (H): Primary | ICD-10-CM

## 2021-09-13 DIAGNOSIS — E03.8 OTHER SPECIFIED HYPOTHYROIDISM: ICD-10-CM

## 2021-09-15 NOTE — TELEPHONE ENCOUNTER
RN reached out to patient after notification of unread mychart message- patient confirmed she was able to get refills on her isordil and spirolactone.     RN relayed message about TSH level from Dr. Roberts (see mychart message)- patient states she does normally take it on empty stomach ever morning but admits a few times she forgets until after eating. Patient did confirm she is taking 200 mg M-Sat and then 400 mg on Sundays. RN informed patient that I would provide updated information to Dr. Roberts and that she may reach out to patient's endocrinologist to discuss increasing the dose. She verbalized understanding.     RN also relayed rest of lab results- negative HIV and Hep C, normal electrolytes and kidney function stable as documented by provider. Patient had no further questions at this time.   Mini Agarwal RN           Normal electrolytes - no sign of electrolyte abnormalities that would be causing more frequent palpitations. Kidney function is stable.   Written by Lenore Roberts DO on 9/13/2021  7:57 AM CDT

## 2021-09-21 RX ORDER — LEVOTHYROXINE SODIUM 200 UG/1
TABLET ORAL
Qty: 160 TABLET | Refills: 3 | Status: ON HOLD | OUTPATIENT
Start: 2021-09-21 | End: 2023-01-01

## 2021-09-21 NOTE — TELEPHONE ENCOUNTER
Please call Pricilla back with plan for levothyroxine. I discussed w/ her Endocrinologist Dr. Wright who agrees with this plan: I would like her to continue 200mcg once a day on weekdays M-F but increase the dose to 400mcg on both Saturday AND Sunday. I'm glad to hear she knows about taking the med first thing on an empty stomach - everyone forgets sometimes but as often as she can take it that way, the better. She can use the pills she already has at home but I can send in an updated prescription since she will use up this prescription more quickly with the increased dose. Please confirm preferred pharmacy for that prescription.     We should recheck the thyroid blood work again about 6 weeks after she makes this dose change. That will also be around the time when we should have a clinic visit to check in, recheck blood pressure, make sure she's getting the support and follow up she needs, and continue to chip away at health concerns and health screenings. Please assist w/ scheduling 40 min visit w/ me sometime in that 6-8 week time frame.     Please also remind Pricilla of her Cardiology visit and lab visit scheduled for tomorrow 9/22.

## 2021-09-21 NOTE — TELEPHONE ENCOUNTER
RN spoke with patient and relayed messages from Dr. Roberts. Patient verbalized understanding. Pharmacy updated in chart and scheduled follow up with Dr. Roberts for 11/4/21. RN will also relay medication instructions via MyChart per patient request.     Confirmed cardiology and lab appointments at [facility] for tomorrow.     Abiola Todd RN

## 2021-09-27 DIAGNOSIS — I10 ESSENTIAL HYPERTENSION: Primary | ICD-10-CM

## 2021-09-27 DIAGNOSIS — D63.8 ANEMIA IN OTHER CHRONIC DISEASES CLASSIFIED ELSEWHERE: ICD-10-CM

## 2021-09-27 DIAGNOSIS — E55.9 VITAMIN D DEFICIENCY, UNSPECIFIED: ICD-10-CM

## 2021-09-28 ENCOUNTER — TELEPHONE (OUTPATIENT)
Dept: FAMILY MEDICINE | Facility: CLINIC | Age: 46
End: 2021-09-28

## 2021-09-28 NOTE — TELEPHONE ENCOUNTER
Attempted to reach out to patient today to remind her of Nephrology video visit tomorrow 09/29/21 @ 1:00p.m with . Left detailed message on patient voicemail.    Alesia Mcmullen  Care Coordinator

## 2021-09-29 ENCOUNTER — PRE VISIT (OUTPATIENT)
Dept: NEPHROLOGY | Facility: CLINIC | Age: 46
End: 2021-09-29

## 2021-09-29 ENCOUNTER — VIRTUAL VISIT (OUTPATIENT)
Dept: NEPHROLOGY | Facility: CLINIC | Age: 46
End: 2021-09-29
Attending: INTERNAL MEDICINE
Payer: MEDICARE

## 2021-09-29 DIAGNOSIS — R80.9 PROTEINURIA, UNSPECIFIED TYPE: ICD-10-CM

## 2021-09-29 DIAGNOSIS — Z91.199 FAILURE TO ATTEND APPOINTMENT: Primary | ICD-10-CM

## 2021-09-29 NOTE — LETTER
9/29/2021       RE: Pricilla Brown  [address]  [facility] [address]     Dear Colleague,    Thank you for referring your patient, Pricilla Brown, to the [facility]. Please see a copy of my visit note below.    Left voicemail for patient to call back to set up telemedicine visit, will call again before appointment time.  Caryl Waddell CMA on 9/29/2021 at 12:40 PM    Unable to contact patient on listed number after multiple attempts. Left VM, will ask to reschedule.    Discussed with Dr Rehan Jung MD  Renal Fellow  [phone]        This encounter was opened in error. Please disregard.      Again, thank you for allowing me to participate in the care of your patient.      Sincerely,    Saad Jung MD

## 2021-09-29 NOTE — PROGRESS NOTES
Left voicemail for patient to call back to set up telemedicine visit, will call again before appointment time.  Caryl Waddell CMA on 9/29/2021 at 12:40 PM    Unable to contact patient on listed number after multiple attempts. Left VM, will ask to reschedule.    Discussed with Dr Rehan Jung MD  Renal Fellow  [phone]

## 2021-10-05 ENCOUNTER — TELEPHONE (OUTPATIENT)
Dept: FAMILY MEDICINE | Facility: CLINIC | Age: 46
End: 2021-10-05

## 2021-10-18 ENCOUNTER — PRE VISIT (OUTPATIENT)
Dept: CARDIOLOGY | Facility: CLINIC | Age: 46
End: 2021-10-18

## 2021-10-18 DIAGNOSIS — I50.32 CHRONIC DIASTOLIC HEART FAILURE (H): Primary | ICD-10-CM

## 2021-10-20 ENCOUNTER — LAB (OUTPATIENT)
Dept: LAB | Facility: CLINIC | Age: 46
End: 2021-10-20
Payer: MEDICARE

## 2021-10-20 ENCOUNTER — TELEPHONE (OUTPATIENT)
Dept: CARDIOLOGY | Facility: CLINIC | Age: 46
End: 2021-10-20

## 2021-10-20 ENCOUNTER — OFFICE VISIT (OUTPATIENT)
Dept: CARDIOLOGY | Facility: CLINIC | Age: 46
End: 2021-10-20
Payer: MEDICARE

## 2021-10-20 VITALS
HEIGHT: 67 IN | WEIGHT: 293 LBS | BODY MASS INDEX: 45.99 KG/M2 | OXYGEN SATURATION: 99 % | DIASTOLIC BLOOD PRESSURE: 91 MMHG | HEART RATE: 90 BPM | SYSTOLIC BLOOD PRESSURE: 144 MMHG

## 2021-10-20 DIAGNOSIS — I50.32 CHRONIC DIASTOLIC HEART FAILURE (H): ICD-10-CM

## 2021-10-20 DIAGNOSIS — I42.0 DILATED CARDIOMYOPATHY (H): ICD-10-CM

## 2021-10-20 DIAGNOSIS — I50.33 ACUTE ON CHRONIC HEART FAILURE WITH PRESERVED EJECTION FRACTION (H): ICD-10-CM

## 2021-10-20 LAB
ALBUMIN SERPL-MCNC: 2.6 G/DL (ref 3.4–5)
ALP SERPL-CCNC: 78 U/L (ref 40–150)
ALT SERPL W P-5'-P-CCNC: 20 U/L (ref 0–50)
ANION GAP SERPL CALCULATED.3IONS-SCNC: 5 MMOL/L (ref 3–14)
AST SERPL W P-5'-P-CCNC: 12 U/L (ref 0–45)
BILIRUB SERPL-MCNC: 0.4 MG/DL (ref 0.2–1.3)
BUN SERPL-MCNC: 14 MG/DL (ref 7–30)
CALCIUM SERPL-MCNC: 8.8 MG/DL (ref 8.5–10.1)
CHLORIDE BLD-SCNC: 101 MMOL/L (ref 94–109)
CO2 SERPL-SCNC: 30 MMOL/L (ref 20–32)
CREAT SERPL-MCNC: 1.06 MG/DL (ref 0.52–1.04)
ERYTHROCYTE [DISTWIDTH] IN BLOOD BY AUTOMATED COUNT: 12.7 % (ref 10–15)
GFR SERPL CREATININE-BSD FRML MDRD: 63 ML/MIN/1.73M2
GLUCOSE BLD-MCNC: 376 MG/DL (ref 70–99)
HCT VFR BLD AUTO: 47.9 % (ref 35–47)
HGB BLD-MCNC: 15.6 G/DL (ref 11.7–15.7)
IRON SATN MFR SERPL: 25 % (ref 15–46)
IRON SERPL-MCNC: 60 UG/DL (ref 35–180)
MCH RBC QN AUTO: 30.6 PG (ref 26.5–33)
MCHC RBC AUTO-ENTMCNC: 32.6 G/DL (ref 31.5–36.5)
MCV RBC AUTO: 94 FL (ref 78–100)
NT-PROBNP SERPL-MCNC: 669 PG/ML (ref 0–125)
PLATELET # BLD AUTO: 280 10E3/UL (ref 150–450)
POTASSIUM BLD-SCNC: 4.3 MMOL/L (ref 3.4–5.3)
PROT SERPL-MCNC: 7.9 G/DL (ref 6.8–8.8)
RBC # BLD AUTO: 5.1 10E6/UL (ref 3.8–5.2)
SODIUM SERPL-SCNC: 136 MMOL/L (ref 133–144)
TIBC SERPL-MCNC: 244 UG/DL (ref 240–430)
WBC # BLD AUTO: 8.6 10E3/UL (ref 4–11)

## 2021-10-20 PROCEDURE — G0463 HOSPITAL OUTPT CLINIC VISIT: HCPCS

## 2021-10-20 PROCEDURE — 83550 IRON BINDING TEST: CPT | Performed by: PATHOLOGY

## 2021-10-20 PROCEDURE — 99214 OFFICE O/P EST MOD 30 MIN: CPT | Performed by: INTERNAL MEDICINE

## 2021-10-20 PROCEDURE — 36415 COLL VENOUS BLD VENIPUNCTURE: CPT | Performed by: PATHOLOGY

## 2021-10-20 PROCEDURE — 85027 COMPLETE CBC AUTOMATED: CPT | Performed by: PATHOLOGY

## 2021-10-20 PROCEDURE — 80053 COMPREHEN METABOLIC PANEL: CPT | Performed by: PATHOLOGY

## 2021-10-20 PROCEDURE — 83880 ASSAY OF NATRIURETIC PEPTIDE: CPT | Performed by: PATHOLOGY

## 2021-10-20 RX ORDER — HYDRALAZINE HYDROCHLORIDE 25 MG/1
75 TABLET, FILM COATED ORAL 3 TIMES DAILY
Qty: 450 TABLET | Refills: 3 | Status: ON HOLD | OUTPATIENT
Start: 2021-10-20 | End: 2023-01-01

## 2021-10-20 ASSESSMENT — MIFFLIN-ST. JEOR: SCORE: 2592.56

## 2021-10-20 ASSESSMENT — PAIN SCALES - GENERAL: PAINLEVEL: NO PAIN (0)

## 2021-10-20 NOTE — NURSING NOTE
Chief Complaint   Patient presents with     Follow Up     Return Heart Failure: 46yr old female presents with HFpEF for follow up and to re-establish care with labs prior     Vitals were taken and medications reconciled.    Aj Jett, EMT  1:33 PM

## 2021-10-20 NOTE — PROGRESS NOTES
Medical Director   [facility]    [address] [place] [address]        Kristen Cuello NP   [facility]   [address], [address] [place] [address]       [facility]   Coagulation Clinic       NANETTE Chin   [facility]    [address] [facility] [address]   [place] [address]      Jamilah Bernal MD   [facility]   [address] [place] [address]        Impression:  1. Obesity  2. Diabetes  3. Smoking  4. Elevated body index  5. Hypothyroidism  6. Atrial fibrillation paroxysmal      The patient returns for follow-up of heart failure.  There is no interim history of chest pain, tightness, paroxysmal nocturnal dyspnea, orthopnea, peripheral edema, palpitation, pre-syncope, syncope, device discharge.  Exercise tolerance is stable.  The patient is attempting to exercise regularly and following a sodium restricted, calorically appropriate diet.  Medications are reviewed and the patient is taking medications as prescribed.  The patient is generally sleeping well.     Plan:  1. Congratulations on weight loss but need to be seen in weight loss clinic for new injection therapy  2. Follow with sleep clinic for CPAP adjustment and high hemoglobin  3. Stop smoking  4. COVID vaccination x 2  5. Flu shot  6. Restart anticoagulation for paroxysmal atrial fibrillation  apixaban  7. Increase hydraline to 75mgs morning, mid-afternoon and hs  8. See Dr. Roberts in 4 weeks to follow up blood pressure, consider adding low dose carvedilol  9. Echocardiogram for LV function  10. If not accepted in bariatric program, should consider SGLTt-2    Constitutional: alert, oriented, abnormal gait and station, normal mentation.  Oral: moist mucous membranes  Lymph: without pathologic adenopathy  Chest: clear to ausculation and percussion save for basilar rales  Cor: No evidence of left or right ventricular activity.  Rhythm is regular.  S1 normal, S2  split physiologically. Murmurs are not present  Abdomen: without tenderness, rebound, guarding, masses, ascites  Extremities: lymphedema   Neuro: no focal defects, normal mentation  Skin: without open lesions  Psych: oriented, verbal, mental status in tact        Constitutional:some weight loss,but no fever, chills, night sweats  HEENT: without visual changes, swallow difficulties  Pulmonary: with some  shortness of breath, cough, wheeze, hemoptysis  Cardiac: without chest pain, MURRAY, PND, orthopnea, edema, palpitation, pre-syncope, syncope,  GI: without diarrhea, constipation, jaundice, melena, GERD, hematemesis  : without frequency, urgency, dysuria, hematuria  Skin: rash, bruise, open lesions  Neuro: without TIA, focal neurologic complaints, seizure, trauma  Ortho: without pain, swelling, mobility impairment  Endocrine: diabetes, thyroid, heat/cold intolerance, polyuria, polyphagia, change bowel habits.  Sleep:no JYOTI, periodic breathing, fatigue            Current Outpatient Medications   Medication     ACCU-CHEK RON PLUS test strip     acetaminophen (TYLENOL) 325 MG tablet     albuterol (PROAIR HFA/PROVENTIL HFA/VENTOLIN HFA) 108 (90 Base) MCG/ACT inhaler     atorvastatin (LIPITOR) 40 MG tablet     bisacodyl (DULCOLAX) 10 MG suppository     blood glucose (NO BRAND SPECIFIED) lancets standard     blood glucose (NO BRAND SPECIFIED) test strip     blood glucose (NO BRAND SPECIFIED) test strip     blood glucose (NO BRAND SPECIFIED) test strip     blood glucose monitoring (ACCU-CHEK FASTCLIX) lancets     blood glucose monitoring (IBG STAR) meter device kit     blood glucose monitoring (NO BRAND SPECIFIED) meter device kit     blood glucose monitoring (NO BRAND SPECIFIED) meter device kit     bumetanide (BUMEX) 1 MG tablet     capsaicin (ZOSTRIX) 0.025 % external cream     ciclopirox (LOPROX) 0.77 % cream     ciclopirox (PENLAC) 8 % external solution     ciprofloxacin (CIPRO) 500 MG tablet     clotrimazole  (LOTRIMIN) 1 % external cream     colchicine (COLCYRS) 0.6 MG tablet     Continuous Blood Gluc  (FREESTYLE BELLO 14 DAY READER) ADRIANO     Continuous Blood Gluc Sensor (FREESTYLE BELLO 14 DAY SENSOR) MISC     diclofenac (VOLTAREN) 1 % topical gel     Dulaglutide (TRULICITY) 3 MG/0.5ML SOPN     DULoxetine (CYMBALTA) 20 MG capsule     Efinaconazole 10 % SOLN     gabapentin (NEURONTIN) 300 MG capsule     hydrALAZINE (APRESOLINE) 25 MG tablet     insulin aspart (NOVOLOG FLEXPEN) 100 UNIT/ML pen     insulin glargine U-300 (TOUJEO SOLOSTAR) 300 UNIT/ML (1 units dial) pen     insulin pen needle (BD RIVER U/F) 32G X 4 MM miscellaneous     isosorbide dinitrate (ISORDIL) 20 MG tablet     JARDIANCE 25 MG TABS tablet     levothyroxine (SYNTHROID/LEVOTHROID) 200 MCG tablet     metoprolol succinate ER (TOPROL-XL) 200 MG 24 hr tablet     nystatin (MYCOSTATIN) 710821 UNIT/GM external cream     order for DME     order for DME     order for DME     ORDER FOR DME     oxyCODONE-acetaminophen (PERCOCET) 5-325 MG per tablet     polyethylene glycol (MIRALAX/GLYCOLAX) powder     potassium chloride ER (KLOR-CON M) 20 MEQ CR tablet     Respiratory Therapy Supplies (NEBULIZER COMPRESSOR) KIT     senna (SENOKOT) 8.6 MG tablet     spironolactone (ALDACTONE) 50 MG tablet     tiotropium (SPIRIVA HANDIHALER) 18 MCG inhalation capsule     buPROPion (WELLBUTRIN SR) 100 MG 12 hr tablet     ciclopirox (LOPROX) 0.77 % cream     colchicine (COLCYRS) 0.6 MG tablet     dulaglutide (TRULICITY) 1.5 MG/0.5ML pen     methylPREDNISolone (MEDROL DOSEPAK) 4 MG tablet therapy pack     nicotine (NICODERM CQ) 14 MG/24HR 24 hr patch     topiramate (TOPAMAX) 50 MG tablet     TRULICITY 0.75 MG/0.5ML pen     No current facility-administered medications for this visit.     Facility-Administered Medications Ordered in Other Visits   Medication     sodium chloride (PF) 0.9% PF flush 10 mL       Wt Readings from Last 24 Encounters:   10/20/21 (!) 193.2 kg (426 lb)    09/09/21 (!) 196.2 kg (432 lb 8 oz)   07/23/21 (!) 198.1 kg (436 lb 12.8 oz)   09/28/20 (!) 222.3 kg (490 lb)   06/19/20 (!) 222.3 kg (490 lb)   10/29/19 (!) 226.7 kg (499 lb 11.2 oz)   07/24/19 (!) 223.9 kg (493 lb 9.6 oz)   07/23/19 (!) 229.5 kg (506 lb)   06/27/19 (!) 225.9 kg (498 lb)   03/13/19 (!) 227.2 kg (500 lb 14.4 oz)   03/13/19 (!) 227.3 kg (501 lb)   02/19/19 (!) 229.5 kg (506 lb)   01/07/19 (!) 224.6 kg (495 lb 1.6 oz)   11/30/18 (!) 220.1 kg (485 lb 4.8 oz)   11/29/18 (!) 220 kg (485 lb)   10/30/18 (!) 215.1 kg (474 lb 1.6 oz)   10/25/18 (!) 211.4 kg (466 lb)   10/11/18 (!) 211.5 kg (466 lb 4.8 oz)   10/09/18 (!) 207 kg (456 lb 4.8 oz)   09/20/18 (!) 206.8 kg (456 lb)   09/11/18 (!) 210.7 kg (464 lb 9.6 oz)   07/25/18 (!) 212.4 kg (468 lb 3.2 oz)   07/19/18 (!) 210.5 kg (464 lb)   06/19/18 (!) 213.7 kg (471 lb 3.2 oz)     Results for BRAXTON SALMERON (MRN [id number]) as of 10/20/2021 13:54   Ref. Range 7/28/2021 10:38 9/9/2021 11:03 10/20/2021 13:15   Sodium Latest Ref Range: 133 - 144 mmol/L  136 136   Potassium Latest Ref Range: 3.4 - 5.3 mmol/L  3.7 4.3   Chloride Latest Ref Range: 94 - 109 mmol/L  98 101   Carbon Dioxide Latest Ref Range: 20 - 32 mmol/L  28 30   Urea Nitrogen Latest Ref Range: 7 - 30 mg/dL  18 14   Creatinine Latest Ref Range: 0.52 - 1.04 mg/dL  0.97 1.06 (H)   GFR Estimate Latest Ref Range: >60 mL/min/1.73m2  70 63   Calcium Latest Ref Range: 8.5 - 10.1 mg/dL  9.2 8.8   Anion Gap Latest Ref Range: 3 - 14 mmol/L  10 5   Magnesium Latest Ref Range: 1.6 - 2.3 mg/dL  1.6    Albumin Latest Ref Range: 3.4 - 5.0 g/dL   2.6 (L)   Protein Total Latest Ref Range: 6.8 - 8.8 g/dL   7.9   Bilirubin Total Latest Ref Range: 0.2 - 1.3 mg/dL   0.4   Alkaline Phosphatase Latest Ref Range: 40 - 150 U/L   78   ALT Latest Ref Range: 0 - 50 U/L   20   AST Latest Ref Range: 0 - 45 U/L   12   Iron Latest Ref Range: 35 - 180 ug/dL   60   Iron Binding Cap Latest Ref Range: 240 - 430 ug/dL   244   Iron  Saturation Index Latest Ref Range: 15 - 46 %   25   N-Terminal Pro Bnp Latest Ref Range: 0 - 125 pg/mL   669 (H)   TSH Latest Ref Range: 0.40 - 4.00 mU/L  6.79 (H)    Uric Acid Latest Ref Range: 2.6 - 6.0 mg/dL 7.3 (H)     Glucose Latest Ref Range: 70 - 99 mg/dL  168 (H) 376 (H)   WBC Latest Ref Range: 4.0 - 11.0 10e3/uL 9.3  8.6   Hemoglobin Latest Ref Range: 11.7 - 15.7 g/dL 17.2 (H)  15.6   Hematocrit Latest Ref Range: 35.0 - 47.0 % 53.0 (H)  47.9 (H)   Platelet Count Latest Ref Range: 150 - 450 10e3/uL 253  280   RBC Count Latest Ref Range: 3.80 - 5.20 10e6/uL 5.67 (H)  5.10   MCV Latest Ref Range: 78 - 100 fL 94  94   MCH Latest Ref Range: 26.5 - 33.0 pg 30.3  30.6   MCHC Latest Ref Range: 31.5 - 36.5 g/dL 32.5  32.6   RDW Latest Ref Range: 10.0 - 15.0 % 13.3  12.7   % Neutrophils Latest Units: % 69     % Lymphocytes Latest Units: % 21     % Monocytes Latest Units: % 7     % Eosinophils Latest Units: % 1     % Basophils Latest Units: % 1     Absolute Basophils Latest Ref Range: 0.0 - 0.2 10e3/uL 0.1     Absolute Eosinophils Latest Ref Range: 0.0 - 0.7 10e3/uL 0.1     Absolute Immature Granulocytes Latest Ref Range: <=0.0 10e3/uL 0.1 (H)     Absolute Lymphocytes Latest Ref Range: 0.8 - 5.3 10e3/uL 2.0     Absolute Monocytes Latest Ref Range: 0.0 - 1.3 10e3/uL 0.7     % Immature Granulocytes Latest Units: % 1     Absolute Neutrophils Latest Ref Range: 1.6 - 8.3 10e3/uL 6.4     Absolute NRBCs Latest Units: 10e3/uL 0.0     NRBCs per 100 WBC Latest Ref Range: <1 /100 0     Sed Rate Latest Ref Range: 0 - 20 mm/hr 8     Hepatitis C Antibody Latest Ref Range: Nonreactive   Nonreactive    HIV Antigen Antibody Combo Latest Ref Range: Nonreactive   Nonreactive

## 2021-10-20 NOTE — TELEPHONE ENCOUNTER
I called the pt to give her the info for the referral and she said the clinic already contacted her and she is scheduled for Nov 23 with them.     Abdullahi ROMERO

## 2021-10-20 NOTE — PATIENT INSTRUCTIONS
"You were seen today in the Cardiovascular Clinic at the [facility].      Cardiology Providers you saw during your visit:  Dr. Percy Alcala     Recommendations:   1. Please restart taking apixaban/Eliquis 5mg twice daily for stroke prevention for A.fib  2. Increase hydralazine to 75mg three times daily.   3. See Dr. Roberts in 4 weeks to follow up blood pressure  4. Echocardiogram, next available  5. Please follow-up with Kristen Cuello in [facility] in 1 month, labs prior.    Stop in Pharmacy on your way out and get your Covid Vaccine - please ask for Moderna    Eat a heart healthy, low sodium diet.  Get 20 to 30 minutes of aerobic exercise 4 to 5 times per week as tolerated. (Examples of aerobic exercise include: walking, bicycling, swimming, running).     Thank you for your visit today!   Please MyChart message or call if you have any questions or concerns.      During Business Hours:  [phone], option # 1 (University)  Then option # 4 (medical questions)     After hours, weekends or holidays:   [phone], Option #4  Ask to speak to the On-Call Cardiologist. Inform them you are a heart failure patient at the [employer].      Ophelia Kenny RN BSN CHFN  Cardiology Care Coordinator - [facility]  Questions and [phone]  First press #1 for the [facility] and then press #4 for \"Your Care Team\" to reach us Cardiology Nurses.                "

## 2021-10-20 NOTE — TELEPHONE ENCOUNTER
[facility] Call Center    Phone Message    May a detailed message be left on voicemail: yes     Reason for Call: Other: Pt was referred to Weight Management program.  Please contact pt with informationon how to schedule an apt.     Action Taken: Message routed to:  [facility] ([facility] cardio    Travel Screening: Not Applicable

## 2021-11-06 ENCOUNTER — HEALTH MAINTENANCE LETTER (OUTPATIENT)
Age: 46
End: 2021-11-06

## 2021-11-23 ENCOUNTER — VIRTUAL VISIT (OUTPATIENT)
Dept: ENDOCRINOLOGY | Facility: CLINIC | Age: 46
End: 2021-11-23
Payer: MEDICARE

## 2021-11-23 VITALS — BODY MASS INDEX: 45.99 KG/M2 | HEIGHT: 67 IN | WEIGHT: 293 LBS

## 2021-11-23 DIAGNOSIS — Z79.4 TYPE 2 DIABETES MELLITUS WITH HYPERGLYCEMIA, WITH LONG-TERM CURRENT USE OF INSULIN (H): Primary | ICD-10-CM

## 2021-11-23 DIAGNOSIS — E11.65 TYPE 2 DIABETES MELLITUS WITH HYPERGLYCEMIA, WITH LONG-TERM CURRENT USE OF INSULIN (H): Primary | ICD-10-CM

## 2021-11-23 PROCEDURE — 99214 OFFICE O/P EST MOD 30 MIN: CPT | Mod: 95 | Performed by: INTERNAL MEDICINE

## 2021-11-23 RX ORDER — TOPIRAMATE 25 MG/1
TABLET, FILM COATED ORAL
Qty: 180 TABLET | Refills: 1 | Status: ON HOLD | OUTPATIENT
Start: 2021-11-23 | End: 2023-01-01 | Stop reason: SINTOL

## 2021-11-23 ASSESSMENT — PAIN SCALES - GENERAL: PAINLEVEL: NO PAIN (0)

## 2021-11-23 ASSESSMENT — ENCOUNTER SYMPTOMS
NERVOUS/ANXIOUS: 0
INSOMNIA: 0

## 2021-11-23 ASSESSMENT — MIFFLIN-ST. JEOR: SCORE: 2592.01

## 2021-11-23 NOTE — LETTER
"2021       RE: Pricilla Brown  [address]  [place] [address]     Dear Colleague,    Thank you for referring your patient, Pricilla Brown, to the [facility] [place] [facility]. Please see a copy of my visit note below.    Return Medical Weight Management Note     Pricilla Brown  MRN:  [id number]  :  1975  JUANITO:  2021    Dear Dolores, Jmailah Ramirez,    I had the pleasure of seeing your patient Pricilla Brown.  She is a 46 year old female who I am continuing to see for treatment of obesity related to: type 2 diabetes, morbid obesity, dilated cardiomyopathy with diastolic dysfunction, atrial fibrillation, hx of PE, HTN, hypothyroidism s/p ROGER for Grave's disease and sleep apnea.     She was seen by myself at [facility]. Last seen  and Steph Kim for evaluation of bariatric surgery. However, she has struggling with prebariatric weight loss.     INTERVAL HISTORY:  Here for [facility] follow up.     Now she is seeing  at [facility] for type 2 diabetes.   A1c was >15 in 2021    She is currently on taking Trulicity 3.0 mg weekly, Jardiance 25 mg, bupropion 100 mg bid, Toujeo 225 units in the morning, Novolog 100 units plus correction. She could not tolerate metformin.     She is no longer on topiramate. She felt it worked in the past but not anymore.   Weight is down 60 lbs in the since .    She is still working toward bariatric surgery and would like to meet with dietitian.    CURRENT WEIGHT:   426 lbs 0 oz    Wt Readings from Last 4 Encounters:   21 (!) 193.2 kg (426 lb)   10/20/21 (!) 193.2 kg (426 lb)   21 (!) 196.2 kg (432 lb 8 oz)   21 (!) 198.1 kg (436 lb 12.8 oz)       Height:  5' 6.5\"  Body Mass Index:  Body mass index is 67.73 kg/m .  Vitals:  Ht 1.689 m (5' 6.5\")   Wt (!) 193.2 kg (426 lb)   BMI 67.73 kg/m      Initial consult weight was 466 on bariatric " consult.  Weight change since last seen on 2/2019 was 80 lbs weight gain  Total loss 40 pounds.      MEDICATIONS:   Current Outpatient Medications   Medication Sig Dispense Refill     ACCU-CHEK RON PLUS test strip USE TO TEST BLOOD SUGAR FOUR TIMES A DAY  OR AS DIRECTED. 360 strip 2     acetaminophen (TYLENOL) 325 MG tablet Take 2 tablets (650 mg) by mouth 3 times daily as needed for mild pain or fever (total acetaminophen dose should not exceed 3000 mg per day) 180 tablet 0     albuterol (PROAIR HFA/PROVENTIL HFA/VENTOLIN HFA) 108 (90 Base) MCG/ACT inhaler Inhale 2 puffs into the lungs every 6 hours as needed for shortness of breath / dyspnea 75 g 0     apixaban ANTICOAGULANT (ELIQUIS) 5 MG tablet Take 1 tablet (5 mg) by mouth 2 times daily 180 tablet 3     atorvastatin (LIPITOR) 40 MG tablet Take 1 tablet (40 mg) by mouth daily 90 tablet 3     bisacodyl (DULCOLAX) 10 MG suppository Place 1 suppository (10 mg) rectally daily as needed for constipation 6 suppository 0     blood glucose (NO BRAND SPECIFIED) lancets standard USE TO CHECK  blood sugar fasting each am  prelunch and predinner daily OR AS DIRECTED Call clinic to schedule MD APPT. 400 each 3     blood glucose (NO BRAND SPECIFIED) test strip Use to test blood sugar 4 times daily or as directed. 400 strip 3     blood glucose (NO BRAND SPECIFIED) test strip Use to test blood sugar 4 times daily or as directed. Accu check Ron plus 400 strip 3     blood glucose (NO BRAND SPECIFIED) test strip Use to test blood sugars 3 times daily or as directed 400 strip 3     blood glucose monitoring (ACCU-CHEK FASTCLIX) lancets Use to test blood sugar 4 times daily or as directed.or lancets that go with meter being used 360 each 3     blood glucose monitoring (IBG STAR) meter device kit -PLEASE GIVE PATIENT A DEVICE HER INSURANCE WILL COVER- 1 kit 0     blood glucose monitoring (NO BRAND SPECIFIED) meter device kit Use to test blood sugar 4  times daily or as directed. 1  kit 0     blood glucose monitoring (NO BRAND SPECIFIED) meter device kit Use to test blood sugar 3 times daily or as directed. 1 kit 1     bumetanide (BUMEX) 1 MG tablet Take 5 tablets (5 mg) by mouth 2 times daily For additional refills, please schedule a follow-up appointment at [phone] with Dr. Alcala or Kristen Cuello,  tablet 1     buPROPion (WELLBUTRIN SR) 100 MG 12 hr tablet Take 1 tablet (100 mg) by mouth 2 times daily (Patient not taking: Reported on 10/20/2021) 180 tablet 0     capsaicin (ZOSTRIX) 0.025 % external cream Apply 1 g topically 3 times daily as needed (use for neuropathy pain) 1 Tube 1     ciclopirox (LOPROX) 0.77 % cream Apply topically 2 times daily To feet, legs and toenails. (Patient not taking: Reported on 10/20/2021) 90 g 5     ciclopirox (LOPROX) 0.77 % cream Apply topically 2 times daily To feet and toenails. 90 g 6     ciclopirox (PENLAC) 8 % external solution Apply topically daily To toenails. 6.6 mL 0     ciprofloxacin (CIPRO) 500 MG tablet Take 1 tablet (500 mg) by mouth 2 times daily 28 tablet 0     clotrimazole (LOTRIMIN) 1 % external cream Apply topically 2 times daily as needed (skin irritation) 30 g 0     colchicine (COLCYRS) 0.6 MG tablet Take 1 tablet (0.6 mg) by mouth 2 times daily (Patient not taking: Reported on 10/20/2021) 12 tablet 0     colchicine (COLCYRS) 0.6 MG tablet Take 1-2 tablets (0.6-1.2 mg) by mouth daily as needed for moderate pain 180 tablet 0     Continuous Blood Gluc  (FREESTYLE BELLO 14 DAY READER) ADRIANO Use to read blood sugars as 's instructions. 1 each 0     Continuous Blood Gluc Sensor (FREESTYLE BELLO 14 DAY SENSOR) MISC 1 each every 14 days 6 each 3     diclofenac (VOLTAREN) 1 % topical gel Apply 4 grams to knees or 2 grams to hands four times daily using enclosed dosing card. 100 g 1     dulaglutide (TRULICITY) 1.5 MG/0.5ML pen Inject 1.5 mg Subcutaneous every 7 days (Patient not taking: Reported on 10/20/2021) 6 mL 3      Dulaglutide (TRULICITY) 3 MG/0.5ML SOPN Inject 3 mg Subcutaneous once a week 6 mL 3     DULoxetine (CYMBALTA) 20 MG capsule Take 1 capsule (20 mg) by mouth 2 times daily 60 capsule 0     Efinaconazole 10 % SOLN Externally apply topically daily To toenails. 8 mL 11     gabapentin (NEURONTIN) 300 MG capsule TAKE TWO CAPSULES BY MOUTH TWICE A  capsule 1     hydrALAZINE (APRESOLINE) 25 MG tablet Take 3 tablets (75 mg) by mouth 3 times daily 450 tablet 3     insulin aspart (NOVOLOG FLEXPEN) 100 UNIT/ML pen Inject 100  units with meals,plus correction.Pt uses approx 325 units/24 hrs. 250 mL 3     insulin glargine U-300 (TOUJEO SOLOSTAR) 300 UNIT/ML (1 units dial) pen INJECT 225 UNITS UNDER THE SKIN EVERY MORNING. Lab draw and clinic visit needed. Call  to schedule. 230 mL 2     insulin pen needle (BD RIVER U/F) 32G X 4 MM miscellaneous Use 6 daily or as directed 600 each 3     isosorbide dinitrate (ISORDIL) 20 MG tablet Take 1 tablet (20 mg) by mouth 3 times daily 270 tablet 0     JARDIANCE 25 MG TABS tablet TAKE ONE TABLET BY MOUTH ONCE DAILY 90 tablet 3     levothyroxine (SYNTHROID/LEVOTHROID) 200 MCG tablet Take one tablet (200mg) by mouth daily Monday through Friday, and take two tablets (400mcg) on Saturdays and Sundays 160 tablet 3     methylPREDNISolone (MEDROL DOSEPAK) 4 MG tablet therapy pack Follow Package Directions (Patient not taking: Reported on 10/20/2021) 21 tablet 0     metoprolol succinate ER (TOPROL-XL) 200 MG 24 hr tablet Take 1 tablet (200 mg) by mouth daily 90 tablet 2     nicotine (NICODERM CQ) 14 MG/24HR 24 hr patch Place 1 patch onto the skin every 24 hours (Patient not taking: Reported on 9/9/2021) 90 patch 0     nystatin (MYCOSTATIN) 649981 UNIT/GM external cream Apply topically 2 times daily To toenails 90 g 6     order for DME Left foot 1 Units 0     order for DME Equipment being ordered: BI 2358-1203 $92   Plantar, fasciitis, LG, night splint 1 each 0     order for  DME Equipment being ordered: Challenger Herminia walker if available - patient needs seat, basket and brakes. 1 each 0     ORDER FOR DME Use your CPAP device as directed by your provider. Pressure change to min 13 max 18cwp 1 each 99     oxyCODONE-acetaminophen (PERCOCET) 5-325 MG per tablet Take 1 tablet by mouth every 8 hours as needed for moderate to severe pain (try to limit use, no further prescriptions until seen in pain clinic) 60 tablet 0     polyethylene glycol (MIRALAX/GLYCOLAX) powder        potassium chloride ER (KLOR-CON M) 20 MEQ CR tablet TAKE TWO TABLETS BY MOUTH FOUR TIMES A  tablet 6     Respiratory Therapy Supplies (NEBULIZER COMPRESSOR) KIT 1 Device 4 times daily as needed. 1 kit 3     senna (SENOKOT) 8.6 MG tablet Take 1 tablet by mouth 2 times daily 45 tablet 0     spironolactone (ALDACTONE) 50 MG tablet Take 1 tablet (50 mg) by mouth daily 90 tablet 0     tiotropium (SPIRIVA HANDIHALER) 18 MCG inhalation capsule Inhale contents of one capsule daily. 30 capsule 1     topiramate (TOPAMAX) 50 MG tablet Take 3 tablets (150 mg) by mouth daily (Patient not taking: Reported on 10/20/2021) 90 tablet 1     TRULICITY 0.75 MG/0.5ML pen INJECT THE CONTENTS OF ONE SYRINGE ONCE DAILY EVERY 7 DAYS (Patient not taking: Reported on 10/20/2021) 2 mL 0     Lab Results   Component Value Date    A1C >15.0 07/23/2021    A1C >14.0 08/19/2020    A1C 8.4 10/11/2018    A1C 9.6 05/29/2018    A1C 9.0 04/07/2017    A1C 12.1 03/10/2016     ENDO DIABETES Latest Ref Rng & Units 7/23/2021   CHOL <200 mg/dL 192   LDL <=100 mg/dL 117 (H)   HDL >=50 mg/dL 47 (L)   NHDL <130 mg/dL 145 (H)   VLDL 0 - 30 mg/dL    TRIGLYCERIDES <150 mg/dL 141     ENDO DIABETES Latest Ref Rng & Units 10/20/2021   CREATININE 0.52 - 1.04 mg/dL 1.06 (H)     ASSESSMENT:   Pricilla Brown is a 46 years old female with hx of type 2 diabetes, morbid obesity, dilated cardiomyopathy with diastolic dysfunction, atrial fibrillation, hx of PE, HTN,  hypothyroidism s/p ROGER for Grave's disease and sleep apnea who here for MW follow up.  She is interested in sleeve gastrectomy and struggling to lose 46 lbs before surgery.     +strong craving on sweet  Lack of exercise due to back pain, ankle pain      Lost f/up for 3 years  Diabetes is not in good control. A1c was >15 in July 2021. F/up with   Lost 80 lbs in the past 3 years    PLAN:   - Change Trulicity to Ozempic 1.0 mg weekly  - Start topiramate: Take 25 mg (one tab) at bedtime for one week, then increase to 50 mg (two tabs) at bedtime, then increase to 75 mg (3 tabs) thereafter.   - Continue to work with dietitian regularly (monthly)  - Continue with Trulicity 1.5 mg weekly  - F/u with  for type 2 diabetes      FOLLOW-UP:    See dietitian monthly   See Lauren Bloch in 1 month  See Dr.Tasma CARDONA in 2 months    Start: 11/23/2021 01:31 pm  Stop: 11/23/2021 01:41 pm  VDO duration 10 minutes    External notes/medical records independently reviewed, labs and imaging independently reviewed, medical management and tests to be discussed/communicated to patient.    Time: I spent 39 minutes spent on the date of the encounter preparing to see patient (including chart review and preparation), obtaining and or reviewing additional medical history, performing a physical exam and evaluation, documenting clinical information in the electronic health record, independently interpreting results, communicating results to the patient and coordinating care.      Sincerely,    Silva Damon MD

## 2021-11-23 NOTE — PROGRESS NOTES
Pricilla is a 46 year old who is being evaluated via a billable video visit.      How would you like to obtain your AVS? MyChart  If the video visit is dropped, the invitation should be resent by: Text to cell phone: [phone]  Will anyone else be joining your video visit? No      Video-Visit Details    Type of service:  Video Visit    Originating Location (pt. Location): Home    Distant Location (provider location):  [facility] [place]     Platform used for Video Visit: [employer]

## 2021-11-23 NOTE — PATIENT INSTRUCTIONS
- change Trulicity to Ozempic 1.0 mg weekly  - Start topiramate: Take 25 mg (one tab) at bedtime for one week, then increase to 50 mg (two tabs) at bedtime, then increase to 75 mg (3 tabs) thereafter.      See nutritionist  See Lauren Bloch in 1 month  See Dr.Tasma CARDONA in 2 months    If you have any questions, please do not hesitate to call Weight management clinic at [phone] or [phone].  If you need to fax, please fax to [phone].    Sincerely,    Silva Damon MD  Endocrinology

## 2021-11-23 NOTE — PROGRESS NOTES
"Return Medical Weight Management Note     Pricilla Brown  MRN:  [id number]  :  1975  JUANITO:  2021    Dear Dolores, Jamilah Ramirez,    I had the pleasure of seeing your patient Pricilla Brown.  She is a 46 year old female who I am continuing to see for treatment of obesity related to: type 2 diabetes, morbid obesity, dilated cardiomyopathy with diastolic dysfunction, atrial fibrillation, hx of PE, HTN, hypothyroidism s/p ROGER for Grave's disease and sleep apnea.     She was seen by myself at [facility]. Last seen  and Steph Kim for evaluation of bariatric surgery. However, she has struggling with prebariatric weight loss.     INTERVAL HISTORY:  Here for [facility] follow up.     Now she is seeing  at [facility] for type 2 diabetes.   A1c was >15 in 2021    She is currently on taking Trulicity 3.0 mg weekly, Jardiance 25 mg, bupropion 100 mg bid, Toujeo 225 units in the morning, Novolog 100 units plus correction. She could not tolerate metformin.     She is no longer on topiramate. She felt it worked in the past but not anymore.   Weight is down 60 lbs in the since .    She is still working toward bariatric surgery and would like to meet with dietitian.    CURRENT WEIGHT:   426 lbs 0 oz    Wt Readings from Last 4 Encounters:   21 (!) 193.2 kg (426 lb)   10/20/21 (!) 193.2 kg (426 lb)   21 (!) 196.2 kg (432 lb 8 oz)   21 (!) 198.1 kg (436 lb 12.8 oz)       Height:  5' 6.5\"  Body Mass Index:  Body mass index is 67.73 kg/m .  Vitals:  Ht 1.689 m (5' 6.5\")   Wt (!) 193.2 kg (426 lb)   BMI 67.73 kg/m      Initial consult weight was 466 on bariatric consult.  Weight change since last seen on 2019 was 80 lbs weight gain  Total loss 40 pounds.      MEDICATIONS:   Current Outpatient Medications   Medication Sig Dispense Refill     ACCU-CHEK RON PLUS test strip USE TO TEST BLOOD SUGAR FOUR TIMES A DAY  OR AS DIRECTED. 360 strip 2     acetaminophen (TYLENOL) 325 MG tablet " Take 2 tablets (650 mg) by mouth 3 times daily as needed for mild pain or fever (total acetaminophen dose should not exceed 3000 mg per day) 180 tablet 0     albuterol (PROAIR HFA/PROVENTIL HFA/VENTOLIN HFA) 108 (90 Base) MCG/ACT inhaler Inhale 2 puffs into the lungs every 6 hours as needed for shortness of breath / dyspnea 75 g 0     apixaban ANTICOAGULANT (ELIQUIS) 5 MG tablet Take 1 tablet (5 mg) by mouth 2 times daily 180 tablet 3     atorvastatin (LIPITOR) 40 MG tablet Take 1 tablet (40 mg) by mouth daily 90 tablet 3     bisacodyl (DULCOLAX) 10 MG suppository Place 1 suppository (10 mg) rectally daily as needed for constipation 6 suppository 0     blood glucose (NO BRAND SPECIFIED) lancets standard USE TO CHECK  blood sugar fasting each am  prelunch and predinner daily OR AS DIRECTED Call clinic to schedule MD APPT. 400 each 3     blood glucose (NO BRAND SPECIFIED) test strip Use to test blood sugar 4 times daily or as directed. 400 strip 3     blood glucose (NO BRAND SPECIFIED) test strip Use to test blood sugar 4 times daily or as directed. Accu check Sangeeta plus 400 strip 3     blood glucose (NO BRAND SPECIFIED) test strip Use to test blood sugars 3 times daily or as directed 400 strip 3     blood glucose monitoring (ACCU-CHEK FASTCLIX) lancets Use to test blood sugar 4 times daily or as directed.or lancets that go with meter being used 360 each 3     blood glucose monitoring (IBG STAR) meter device kit -PLEASE GIVE PATIENT A DEVICE HER INSURANCE WILL COVER- 1 kit 0     blood glucose monitoring (NO BRAND SPECIFIED) meter device kit Use to test blood sugar 4  times daily or as directed. 1 kit 0     blood glucose monitoring (NO BRAND SPECIFIED) meter device kit Use to test blood sugar 3 times daily or as directed. 1 kit 1     bumetanide (BUMEX) 1 MG tablet Take 5 tablets (5 mg) by mouth 2 times daily For additional refills, please schedule a follow-up appointment at [phone] with Dr. Alcala or Kristen Cuello,   tablet 1     buPROPion (WELLBUTRIN SR) 100 MG 12 hr tablet Take 1 tablet (100 mg) by mouth 2 times daily (Patient not taking: Reported on 10/20/2021) 180 tablet 0     capsaicin (ZOSTRIX) 0.025 % external cream Apply 1 g topically 3 times daily as needed (use for neuropathy pain) 1 Tube 1     ciclopirox (LOPROX) 0.77 % cream Apply topically 2 times daily To feet, legs and toenails. (Patient not taking: Reported on 10/20/2021) 90 g 5     ciclopirox (LOPROX) 0.77 % cream Apply topically 2 times daily To feet and toenails. 90 g 6     ciclopirox (PENLAC) 8 % external solution Apply topically daily To toenails. 6.6 mL 0     ciprofloxacin (CIPRO) 500 MG tablet Take 1 tablet (500 mg) by mouth 2 times daily 28 tablet 0     clotrimazole (LOTRIMIN) 1 % external cream Apply topically 2 times daily as needed (skin irritation) 30 g 0     colchicine (COLCYRS) 0.6 MG tablet Take 1 tablet (0.6 mg) by mouth 2 times daily (Patient not taking: Reported on 10/20/2021) 12 tablet 0     colchicine (COLCYRS) 0.6 MG tablet Take 1-2 tablets (0.6-1.2 mg) by mouth daily as needed for moderate pain 180 tablet 0     Continuous Blood Gluc  (FREESTYLE BELLO 14 DAY READER) ADRIANO Use to read blood sugars as 's instructions. 1 each 0     Continuous Blood Gluc Sensor (PlaychemySTYLE BELLO 14 DAY SENSOR) MISC 1 each every 14 days 6 each 3     diclofenac (VOLTAREN) 1 % topical gel Apply 4 grams to knees or 2 grams to hands four times daily using enclosed dosing card. 100 g 1     dulaglutide (TRULICITY) 1.5 MG/0.5ML pen Inject 1.5 mg Subcutaneous every 7 days (Patient not taking: Reported on 10/20/2021) 6 mL 3     Dulaglutide (TRULICITY) 3 MG/0.5ML SOPN Inject 3 mg Subcutaneous once a week 6 mL 3     DULoxetine (CYMBALTA) 20 MG capsule Take 1 capsule (20 mg) by mouth 2 times daily 60 capsule 0     Efinaconazole 10 % SOLN Externally apply topically daily To toenails. 8 mL 11     gabapentin (NEURONTIN) 300 MG capsule TAKE TWO CAPSULES  BY MOUTH TWICE A  capsule 1     hydrALAZINE (APRESOLINE) 25 MG tablet Take 3 tablets (75 mg) by mouth 3 times daily 450 tablet 3     insulin aspart (NOVOLOG FLEXPEN) 100 UNIT/ML pen Inject 100  units with meals,plus correction.Pt uses approx 325 units/24 hrs. 250 mL 3     insulin glargine U-300 (TOUJEO SOLOSTAR) 300 UNIT/ML (1 units dial) pen INJECT 225 UNITS UNDER THE SKIN EVERY MORNING. Lab draw and clinic visit needed. Call  to schedule. 230 mL 2     insulin pen needle (BD RIVER U/F) 32G X 4 MM miscellaneous Use 6 daily or as directed 600 each 3     isosorbide dinitrate (ISORDIL) 20 MG tablet Take 1 tablet (20 mg) by mouth 3 times daily 270 tablet 0     JARDIANCE 25 MG TABS tablet TAKE ONE TABLET BY MOUTH ONCE DAILY 90 tablet 3     levothyroxine (SYNTHROID/LEVOTHROID) 200 MCG tablet Take one tablet (200mg) by mouth daily Monday through Friday, and take two tablets (400mcg) on Saturdays and Sundays 160 tablet 3     methylPREDNISolone (MEDROL DOSEPAK) 4 MG tablet therapy pack Follow Package Directions (Patient not taking: Reported on 10/20/2021) 21 tablet 0     metoprolol succinate ER (TOPROL-XL) 200 MG 24 hr tablet Take 1 tablet (200 mg) by mouth daily 90 tablet 2     nicotine (NICODERM CQ) 14 MG/24HR 24 hr patch Place 1 patch onto the skin every 24 hours (Patient not taking: Reported on 9/9/2021) 90 patch 0     nystatin (MYCOSTATIN) 430803 UNIT/GM external cream Apply topically 2 times daily To toenails 90 g 6     order for DME Left foot 1 Units 0     order for DME Equipment being ordered: [id number] $92   Plantar, fasciitis, LG, night splint 1 each 0     order for DME Equipment being ordered: Challenger Herminia walker if available - patient needs seat, basket and brakes. 1 each 0     ORDER FOR DME Use your CPAP device as directed by your provider. Pressure change to min 13 max 18cwp 1 each 99     oxyCODONE-acetaminophen (PERCOCET) 5-325 MG per tablet Take 1 tablet by mouth every 8 hours as  needed for moderate to severe pain (try to limit use, no further prescriptions until seen in pain clinic) 60 tablet 0     polyethylene glycol (MIRALAX/GLYCOLAX) powder        potassium chloride ER (KLOR-CON M) 20 MEQ CR tablet TAKE TWO TABLETS BY MOUTH FOUR TIMES A  tablet 6     Respiratory Therapy Supplies (NEBULIZER COMPRESSOR) KIT 1 Device 4 times daily as needed. 1 kit 3     senna (SENOKOT) 8.6 MG tablet Take 1 tablet by mouth 2 times daily 45 tablet 0     spironolactone (ALDACTONE) 50 MG tablet Take 1 tablet (50 mg) by mouth daily 90 tablet 0     tiotropium (SPIRIVA HANDIHALER) 18 MCG inhalation capsule Inhale contents of one capsule daily. 30 capsule 1     topiramate (TOPAMAX) 50 MG tablet Take 3 tablets (150 mg) by mouth daily (Patient not taking: Reported on 10/20/2021) 90 tablet 1     TRULICITY 0.75 MG/0.5ML pen INJECT THE CONTENTS OF ONE SYRINGE ONCE DAILY EVERY 7 DAYS (Patient not taking: Reported on 10/20/2021) 2 mL 0     Lab Results   Component Value Date    A1C >15.0 07/23/2021    A1C >14.0 08/19/2020    A1C 8.4 10/11/2018    A1C 9.6 05/29/2018    A1C 9.0 04/07/2017    A1C 12.1 03/10/2016     ENDO DIABETES Latest Ref Rng & Units 7/23/2021   CHOL <200 mg/dL 192   LDL <=100 mg/dL 117 (H)   HDL >=50 mg/dL 47 (L)   NHDL <130 mg/dL 145 (H)   VLDL 0 - 30 mg/dL    TRIGLYCERIDES <150 mg/dL 141     ENDO DIABETES Latest Ref Rng & Units 10/20/2021   CREATININE 0.52 - 1.04 mg/dL 1.06 (H)     ASSESSMENT:   Pricilla Brown is a 46 years old female with hx of type 2 diabetes, morbid obesity, dilated cardiomyopathy with diastolic dysfunction, atrial fibrillation, hx of PE, HTN, hypothyroidism s/p ROGER for Grave's disease and sleep apnea who here for [facility] follow up.  She is interested in sleeve gastrectomy and struggling to lose 46 lbs before surgery.     +strong craving on sweet  Lack of exercise due to back pain, ankle pain      Lost f/up for 3 years  Diabetes is not in good control. A1c was >15 in July 2021. F/up  with   Lost 80 lbs in the past 3 years    PLAN:   - Change Trulicity to Ozempic 1.0 mg weekly  - Start topiramate: Take 25 mg (one tab) at bedtime for one week, then increase to 50 mg (two tabs) at bedtime, then increase to 75 mg (3 tabs) thereafter.   - Continue to work with dietitian regularly (monthly)  - Continue with Trulicity 1.5 mg weekly  - F/u with  for type 2 diabetes      FOLLOW-UP:    See dietitian monthly   See Lauren Bloch in 1 month  See Dr.Tasma CARDONA in 2 months    Start: 11/23/2021 01:31 pm  Stop: 11/23/2021 01:41 pm  VDO duration 10 minutes    External notes/medical records independently reviewed, labs and imaging independently reviewed, medical management and tests to be discussed/communicated to patient.    Time: I spent 39 minutes spent on the date of the encounter preparing to see patient (including chart review and preparation), obtaining and or reviewing additional medical history, performing a physical exam and evaluation, documenting clinical information in the electronic health record, independently interpreting results, communicating results to the patient and coordinating care.      Sincerely,    Silva Damon MD

## 2021-11-23 NOTE — NURSING NOTE
"Chief Complaint   Patient presents with     RECHECK     Follow-up Weight Management       Vitals:    11/23/21 1313   Weight: (!) 193.2 kg (426 lb)   Height: 1.689 m (5' 6.5\")       Body mass index is 67.73 kg/m .                            "

## 2021-12-09 ENCOUNTER — VIRTUAL VISIT (OUTPATIENT)
Dept: ENDOCRINOLOGY | Facility: CLINIC | Age: 46
End: 2021-12-09
Payer: MEDICARE

## 2021-12-09 DIAGNOSIS — Z79.4 TYPE 2 DIABETES MELLITUS WITH HYPERGLYCEMIA, WITH LONG-TERM CURRENT USE OF INSULIN (H): ICD-10-CM

## 2021-12-09 DIAGNOSIS — E11.65 TYPE 2 DIABETES MELLITUS WITH HYPERGLYCEMIA, WITH LONG-TERM CURRENT USE OF INSULIN (H): ICD-10-CM

## 2021-12-09 DIAGNOSIS — Z71.3 NUTRITIONAL COUNSELING: Primary | ICD-10-CM

## 2021-12-09 DIAGNOSIS — E66.9 OBESITY: ICD-10-CM

## 2021-12-09 PROCEDURE — 99207 PR MNT INDIVIDUAL INITIAL EA 15 MIN: CPT | Mod: 95 | Performed by: DIETITIAN, REGISTERED

## 2021-12-09 NOTE — LETTER
"12/9/2021       RE: Pricilla Brown  [address]  [facility] [address]     Dear Colleague,    Thank you for referring your patient, Pricilla Brown, to the [facility] [place] at [facility]. Please see a copy of my visit note below.    Pricilla Brown is a 46 year old female who is being evaluated via a billable telephone visit.     The patient has been notified of following:     \"This telephone visit will be conducted via a call between you and your physician/provider. We have found that certain health care needs can be provided without the need for a physical exam.  This service lets us provide the care you need with a short phone conversation.  If a prescription is necessary we can send it directly to your pharmacy.  If lab work is needed we can place an order for that and you can then stop by our lab to have the test done at a later time.    Telephone visits are billed at different rates depending on your insurance coverage. During this emergency period, for some insurers they may be billed the same as an in-person visit.  Please reach out to your insurance provider with any questions.    If during the course of the call the physician/provider feels a telephone visit is not appropriate, you will not be charged for this service.\"    Patient has given verbal consent for Telephone visit?  Yes    How would you like to obtain your AVS? Haja    Phone call duration: 31 minutes    During this virtual visit the patient is located in [place], patient verifies this as the location during the entirety of this visit.       New Bariatric Nutrition Consultation Note    Reason For Visit: Nutrition Assessment    Pricilla Brown is a 46 year old presenting today for new bariatric nutrition consult.   Pt is interested in laparoscopic sleeve gastrectomy with TBD expected surgery in TBD.  Patient is accompanied by self.  This is pt's first of 3 " "required nutrition visits prior to surgery.     Pt referred by Dr. Ascencio on December 9, 2021.  Patient with Co-morbidities of obesity including:  Type II DM yes  Renal Failure no  Sleep apnea yes  Hypertension yes   Dyslipidemia no  Joint pain no  Back pain no  GERD no     Graves Disease  Hypothyroidism    No flowsheet data found.    ANTHROPOMETRICS:  Estimated body mass index is 67.73 kg/m  as calculated from the following:    Height as of 11/23/21: 1.689 m (5' 6.5\").    Weight as of 11/23/21: 193.2 kg (426 lb).     Current weight: 426 lbs    Initial weight: 466 lbs    Required weight loss goal pre-op: -93 lbs from initial consult weight (goal weight 373 lbs or less before surgery)    No flowsheet data found.   Pt states that her main struggle is lack of physical activity, pop consumption and snacking often throughout the day.    Weight Loss Questions Reviewed With Patient 6/7/2016   How long have you been overweight? Since early childhood   What is the most weight you have lost? 30       SUPPLEMENT INFORMATION:  None    topamax     NUTRITION HISTORY:  Recall Diet Questions Reviewed With Patient 6/7/2016   Describe what you typically consume for breakfast (typical or most recent): oatmeal   Describe what you typically consume for lunch (typical or most recent): salad pop   Describe what you typically consume for supper (typical or most recent): steak veg and potatoes   Describe what you typically consume as snacks (typical or most recent): diffent snacks i   Please indicate the type of milk: 1%   How often do you drink alcohol? Never       No flowsheet data found.    ADDITIONAL INFORMATION:    Minimal physical activity    Dining Out History Reviewed With Patient 6/7/2016   How often do you dine out? Around once a week.   Where do you dine out? (select all that apply) fast food chains   What types of food do you order when you dine out? -       Physical Activity Reviewed With Patient 6/7/2016   How often do you " "exercise? 3 to 4 times per week   What is the duration of your exercise (in minutes)? 15 to 29 minutes.   What types of exercise do you do? walking, swimming   What keeps you from being more active?  Pain, Shortness of breath, Too tired, I need assistance to perform daily physical activities         NUTRITION DIAGNOSIS:  Obesity r/t long history of positive energy balance aeb BMI >30 kg/m2.    INTERVENTION:  Intervention Provided/Education Provided on post-op diet guidelines, vitamins/minerals essential post-operatively, GI anatomy of bariatric surgeries, ways to help prepare for post-op diet guidelines pre-operatively, portion/calorie-control, mindful eating and sources of protein.  Patient demonstrates understanding. Provided pt with list of goals RD contact information.      Questions Reviewed With Patient 2016   How ready are you to make changes regarding your weight? Number 1 = Not ready at all to make changes up to 10 = very ready. 10   How confident are you that you can change? 1 = Not confident that you will be successful making changes up to 10 = very confident. 10       Expected Engagement: good    GOALS:  Relating To Eatin) 9\" Plate method (1/2 plate non-starchy vegetables/fruit, 1/4 plate lean protein, 1/4 plate whole grain starch - no more than 1/2 cup carb/meal)  2) Eat 3 meals per day  3) Avoid snacking if able. If snack is needed use lean protein and/or fruit/vegetable. Examples:   - 2 cup popcorn   - 1 cup mixed berries   - 15 almonds, walnuts, cashews   - carrot/celery sticks and 2 tbsp low-fat ranch   - 1 hard boiled egg   - Part-skim mozzarella cheese stick   - Low-fat, low-sugar greek yogurt with 1/2 cup berries   - Med apple or pear   - sliced bell peppers with 1/2 cup salsa   - 1/2 cup roasted chickpeas   - sliced cucumbers with vinegar    Relating to beverages:  1) Eliminate calorie-containing beverages (avoid juice and pop, have water with lemon/lime)  2) Separate fluids from meals " by 30 minutes before, during, and after eating  3) Drink 48-64 ounces of fluid per day    Relating to dietary supplements:  1) Start a multivitamin containing iron daily   -Centrum Women's Daily   -NatureMade for Her   -One a Day Women    Relating to activity:  1) Increase activity as able (see link below for at home workouts on Youtube)   -https://[email or web address]-youtube-workout-videos/#:~:text=The%2025%20Best%20YouTube%20Workout%20Channels%20To%20Help,...%2010%20Orangetheory%20Fitness.%20...%20More%20items...%20    Relating to cravings:  1) Anytime you have a craving, find an activity to occupy yourself for 15 minutes to see if craving goes away    Surgery Related Nutrition Handouts:    Diet Guidelines after Weight Loss Surgery  [email or web address]     Lifestyle Changes Before and After Weight Loss Surgery: [place] for Success  [email or web address]     Your Stage 1 Diet: Clear Liquids  [email or web address]     Your Stage 2 Diet: Low-fat Full Liquids  [email or web address]     Your Stage 3 Diet: Pureed Foods  [email or web address]     Pureed Pleasures  [email or web address]    Your Stage 4 Diet: Soft Foods  [email or web address]    Your Stage 5 Diet: Regular Foods  [email or web address]    Supplements after Weight Loss Surgery  [email or web address]     Keeping Track of Fluids  [email or web address]    Why Take Supplements for Life after Weight Loss Surgery  [email or web address]     Keeping Up with Your Diet after Weight Loss Surgery  [email or web address]    Preventing Low Blood Sugar after Weight Loss Surgery  [email or web address]     Preventing Dumping Syndrome after Weight Loss Surgery  [email or web address]    Follow up: 1 month, prn     Time spent with patient: 31 minutes.   JENNIFER MEJIA RD, LD

## 2021-12-09 NOTE — PATIENT INSTRUCTIONS
"Enmanuel Pleitez!    Follow-up with RD in 1 month    Thank you,    Marcia Rios, RD, LD  If you would like to schedule or reschedule an appointment with the RD, please call [phone]    Nutrition Goals  Relating To Eatin) 9\" Plate method (1/2 plate non-starchy vegetables/fruit, 1/4 plate lean protein, 1/4 plate whole grain starch - no more than 1/2 cup carb/meal)  2) Eat 3 meals per day  3) Avoid snacking if able. If snack is needed use lean protein and/or fruit/vegetable. Examples:   - 2 cup popcorn   - 1 cup mixed berries   - 15 almonds, walnuts, cashews   - carrot/celery sticks and 2 tbsp low-fat ranch   - 1 hard boiled egg   - Part-skim mozzarella cheese stick   - Low-fat, low-sugar greek yogurt with 1/2 cup berries   - Med apple or pear   - sliced bell peppers with 1/2 cup salsa   - 1/2 cup roasted chickpeas   - sliced cucumbers with vinegar    Relating to beverages:  1) Eliminate calorie-containing beverages (avoid juice and pop, have water with lemon/lime)  2) Separate fluids from meals by 30 minutes before, during, and after eating  3) Drink 48-64 ounces of fluid per day    Relating to dietary supplements:  1) Start a multivitamin containing iron daily   -Centrum Women's Daily   -NatureMade for Her   -One a Day Women    Relating to activity:  1) Increase activity as able (see link below for at home workouts on Youtube)   -https://www.Brightleaf/life/health/v88139717/best-youtube-workout-videos/#:~:text=The%%20Best%20YouTube%20Workout%20Channels%20To%20Help,...%2010%20Orangetheory%20Fitness.%20...%20More%20items...%20    Relating to cravings:  1) Anytime you have a craving, find an activity to occupy yourself for 15 minutes to see if craving goes away    Surgery Related Nutrition Handouts:    Diet Guidelines after Weight Loss Surgery  [email or web address]     Lifestyle Changes Before and After Weight Loss Surgery: [place] for Success  [email or web address]     Your Stage 1 Diet: Clear " Liquids  [email or web address]     Your Stage 2 Diet: Low-fat Full Liquids  [email or web address]     Your Stage 3 Diet: Pureed Foods  [email or web address]     Pureed Pleasures  [email or web address]://[email or web address]    Your Stage 4 Diet: Soft Foods  [email or web address]://[email or web address]    Your Stage 5 Diet: Regular Foods  [email or web address]    Supplements after Weight Loss Surgery  [email or web address]://[email or web address]/[email or web address]     Keeping Track of Fluids  [email or web address]://[email or web address]    Why Take Supplements for Life after Weight Loss Surgery  [email or web address]://Hashtago/[email or web address]     Keeping Up with Your Diet after Weight Loss Surgery  [email or web address]://[email or web address]/[email or web address]    Preventing Low Blood Sugar after Weight Loss Surgery  [email or web address]://[email or web address]     Preventing Dumping Syndrome after Weight Loss Surgery  [email or web address]://[email or web address]        Interested in working with a health ? Health coaches work with you to improve your overall health and wellbeing. They look at the whole person, and may involve discussion of different areas of life, including, but not limited to the four pillars of health (sleep, exercise, nutrition, and stress management). Discuss with your care team if you would like to start working a health .    Health Coaching-3 Pack:    $99 for three health coaching visits    Visits may be done in person or via phone    Coaching is a partnership between the  and the client; Coaches do not prescribe or diagnose    Coaching helps inspire the client to reach his/her personal goals    COMPREHENSIVE WEIGHT MANAGEMENT PROGRAM  VIRTUAL SUPPORT GROUPS    For Support Group Information:    We offer support groups for patients who are working on weight loss and considering, preparing for or have had weight loss surgery. There is no cost for this opportunity.  You are invited to attend the?Virtual Support Groups?provided by any of the following locations:    [facility] via [employer] with  "Falguni Doty RN  2. [place] via [employer] Teams with Juan Sewell, PhD, LP  3. [place] via [employer] Teams with Iesha Rivera RN  4. [facility] via FTL SOLAR Teams with Iesha Pérez [facility]    The following Support Group information can also be found on our website:  [email or web address]    [facility] Weight Loss Surgery Support Group    [facility] Weight Loss Surgery Support Group  The support group is a patient-lead forum that meets monthly to share experiences, encouragement and education. It is open to those who have had weight loss surgery, are scheduled for surgery, and those who are considering surgery.  WHEN: This group meets on the 3rd Wednesday of each month from 5:00PM - 6:00PM virtually using Microsoft Teams.  FACILITATOR: Led by Falguni Doty, the program's Clinical Coordinator.  TO REGISTER: Please contact the clinic via [employer] network or call the nurse line directly at [phone] to inform our staff that you would like an invite sent to you and to let us know the email you would like the invite sent to. Prior to the meeting, a link with directions on how to join the meeting will be sent to you.    2021 Meetings  August 18: \"Let's Talk\" a time for the group to share.  September 15: \"Let's Talk\" a time for the group to share.  October 20: \"Let's Talk\" a time for the group to share.  November 17: Maite Wray RD, MARLA \"Protein, Metabolism and Meal Planning\"  December 15: Rajesh Merino RD, MARLA will speak, \"Recipe Modification\"    [facility] Support Groups    Connections: Bariatric Care Support Group?  This is open to all [facility] (and those external to this program) pre- and post- operative bariatric surgery patients as well as their support system.  WHEN: This group meets the 2nd Tuesday of each month from 6:30 PM - 8:00 PM virtually using FTL SOLAR " "Teams.  FACILITATOR: Led by Juan Sewell, Ph.D who is a Licensed Psychologist with the [facility] Comprehensive Weight Management Program.  TO REGISTER: Please send an email to Juan Sewell, Ph.D.,  at?[email or web address]?if you would like an invitation to the group and to learn about using Microsoft Teams.    2021 Meetings  August 10: Open Forum  September 14: Guest Speaker: Iesha Rivera RN,CBN, CIC, [facility] Comprehensive Weight Management Program, \"Your Hospital Stay and Post-Operative Compliance\"  October 12: Open Forum  November 9: Guest Speaker: Sosa Ta RD,LD, [facility] Comprehensive Weight Management Program,\"Holiday Eating\".  December 14: Guest Speaker Jammie Gooden MD, MPH, [facility], [facility], \"Body Contouring Surgery for Bariatric Surgery Patients\"    Connections: Post-Operative Bariatric Surgery Support Group  This is a support group for [facility] bariatric patients (and those external to [facility]) who have had bariatric surgery and are at least 3 months post-surgery.  WHEN: This support group meets the 4th Wednesday of the month from 11:00 AM - 12:00 PM virtually using Microsoft Teams.  FACILITATOR: Led by Certified Bariatric Nurse, Iesha Rivera RN.  TO REGISTER: Please send an email to Iesha at [email or web address] if you would like an invitation to the group and to learn about using Housebites.    [facility] Healthy Lifestyle Virtual Support Group    Healthy Lifestyle Virtual Support Group?  This is 60 minutes of small group guided discussion, support and resources. All are welcome who want a healthy lifestyle.  WHEN: This group meets monthly on a Friday from 12:30 PM - to 1:30 PM virtually using Microsoft Teams.  FACILITATOR: Led by National Board Certified Health , Iesha Pérez, [facility].  TO REGISTER: Please send an email to Iesha [email or web address] to receive " monthly invites to the group or if you have any questions about having a health . Prior to the meeting, a link with directions on how to join the meeting will be sent to you.    2021 Meetings  August 27: Open Forum  September 24: Sleep and the 7 Types of Rest  October 29: Open Forum  November 19: Gratitude  December 10: Open Forum

## 2021-12-09 NOTE — PROGRESS NOTES
"Pricilla Brown is a 46 year old female who is being evaluated via a billable telephone visit.     The patient has been notified of following:     \"This telephone visit will be conducted via a call between you and your physician/provider. We have found that certain health care needs can be provided without the need for a physical exam.  This service lets us provide the care you need with a short phone conversation.  If a prescription is necessary we can send it directly to your pharmacy.  If lab work is needed we can place an order for that and you can then stop by our lab to have the test done at a later time.    Telephone visits are billed at different rates depending on your insurance coverage. During this emergency period, for some insurers they may be billed the same as an in-person visit.  Please reach out to your insurance provider with any questions.    If during the course of the call the physician/provider feels a telephone visit is not appropriate, you will not be charged for this service.\"    Patient has given verbal consent for Telephone visit?  Yes    How would you like to obtain your AVS? MyChart    Phone call duration: 31 minutes    During this virtual visit the patient is located in [place], patient verifies this as the location during the entirety of this visit.       New Bariatric Nutrition Consultation Note    Reason For Visit: Nutrition Assessment    Pricilla Brown is a 46 year old presenting today for new bariatric nutrition consult.   Pt is interested in laparoscopic sleeve gastrectomy with TBD expected surgery in TBD.  Patient is accompanied by self.  This is pt's first of 3 required nutrition visits prior to surgery.     Pt referred by Dr. Ascencio on December 9, 2021.  Patient with Co-morbidities of obesity including:  Type II DM yes  Renal Failure no  Sleep apnea yes  Hypertension yes   Dyslipidemia no  Joint pain no  Back pain no  GERD no     Graves Disease  Hypothyroidism    No flowsheet data " "found.    ANTHROPOMETRICS:  Estimated body mass index is 67.73 kg/m  as calculated from the following:    Height as of 11/23/21: 1.689 m (5' 6.5\").    Weight as of 11/23/21: 193.2 kg (426 lb).     Current weight: 426 lbs    Initial weight: 466 lbs    Required weight loss goal pre-op: -93 lbs from initial consult weight (goal weight 373 lbs or less before surgery)    No flowsheet data found.   Pt states that her main struggle is lack of physical activity, pop consumption and snacking often throughout the day.    Weight Loss Questions Reviewed With Patient 6/7/2016   How long have you been overweight? Since early childhood   What is the most weight you have lost? 30       SUPPLEMENT INFORMATION:  None    topamax     NUTRITION HISTORY:  Recall Diet Questions Reviewed With Patient 6/7/2016   Describe what you typically consume for breakfast (typical or most recent): oatmeal   Describe what you typically consume for lunch (typical or most recent): salad pop   Describe what you typically consume for supper (typical or most recent): steak veg and potatoes   Describe what you typically consume as snacks (typical or most recent): diffent snacks i   Please indicate the type of milk: 1%   How often do you drink alcohol? Never       No flowsheet data found.    ADDITIONAL INFORMATION:    Minimal physical activity    Dining Out History Reviewed With Patient 6/7/2016   How often do you dine out? Around once a week.   Where do you dine out? (select all that apply) fast food chains   What types of food do you order when you dine out? -       Physical Activity Reviewed With Patient 6/7/2016   How often do you exercise? 3 to 4 times per week   What is the duration of your exercise (in minutes)? 15 to 29 minutes.   What types of exercise do you do? walking, swimming   What keeps you from being more active?  Pain, Shortness of breath, Too tired, I need assistance to perform daily physical activities         NUTRITION DIAGNOSIS:  Obesity " "r/t long history of positive energy balance aeb BMI >30 kg/m2.    INTERVENTION:  Intervention Provided/Education Provided on post-op diet guidelines, vitamins/minerals essential post-operatively, GI anatomy of bariatric surgeries, ways to help prepare for post-op diet guidelines pre-operatively, portion/calorie-control, mindful eating and sources of protein.  Patient demonstrates understanding. Provided pt with list of goals RD contact information.      Questions Reviewed With Patient 2016   How ready are you to make changes regarding your weight? Number 1 = Not ready at all to make changes up to 10 = very ready. 10   How confident are you that you can change? 1 = Not confident that you will be successful making changes up to 10 = very confident. 10       Expected Engagement: good    GOALS:  Relating To Eatin) 9\" Plate method (1/2 plate non-starchy vegetables/fruit, 1/4 plate lean protein, 1/4 plate whole grain starch - no more than 1/2 cup carb/meal)  2) Eat 3 meals per day  3) Avoid snacking if able. If snack is needed use lean protein and/or fruit/vegetable. Examples:   - 2 cup popcorn   - 1 cup mixed berries   - 15 almonds, walnuts, cashews   - carrot/celery sticks and 2 tbsp low-fat ranch   - 1 hard boiled egg   - Part-skim mozzarella cheese stick   - Low-fat, low-sugar greek yogurt with 1/2 cup berries   - Med apple or pear   - sliced bell peppers with 1/2 cup salsa   - 1/2 cup roasted chickpeas   - sliced cucumbers with vinegar    Relating to beverages:  1) Eliminate calorie-containing beverages (avoid juice and pop, have water with lemon/lime)  2) Separate fluids from meals by 30 minutes before, during, and after eating  3) Drink 48-64 ounces of fluid per day    Relating to dietary supplements:  1) Start a multivitamin containing iron daily   -Centrum Women's Daily   -NatureMade for Her   -One a Day Women    Relating to activity:  1) Increase activity as able (see link below for at home workouts on " Youtube)   -https://www.Tilt/life/health/b30820425/best-youtube-workout-videos/#:~:text=The%2025%20Best%20YouTube%20Workout%20Channels%20To%20Help,...%2010%20Orangetheory%20Fitness.%20...%20More%20items...%20    Relating to cravings:  1) Anytime you have a craving, find an activity to occupy yourself for 15 minutes to see if craving goes away    Surgery Related Nutrition Handouts:    Diet Guidelines after Weight Loss Surgery  [email or web address]     Lifestyle Changes Before and After Weight Loss Surgery: [place] for Success  [email or web address]     Your Stage 1 Diet: Clear Liquids  [email or web address]     Your Stage 2 Diet: Low-fat Full Liquids  [email or web address]://[email or web address]     Your Stage 3 Diet: Pureed Foods  [email or web address]://[email or web address]     Pureed Pleasures  [email or web address]://[email or web address]    Your Stage 4 Diet: Soft Foods  [email or web address]://[email or web address]    Your Stage 5 Diet: Regular Foods  [email or web address]    Supplements after Weight Loss Surgery  [email or web address]://[email or web address]     Keeping Track of Fluids  [email or web address]://[email or web address]    Why Take Supplements for Life after Weight Loss Surgery  [email or web address]://[email or web address]     Keeping Up with Your Diet after Weight Loss Surgery  [email or web address]://[email or web address]    Preventing Low Blood Sugar after Weight Loss Surgery  [email or web address]://[email or web address]     Preventing Dumping Syndrome after Weight Loss Surgery  [email or web address]://[email or web address]    Follow up: 1 month, prn     Time spent with patient: 31 minutes.   JENNIFER MEJIA RD, MARLA

## 2021-12-14 ENCOUNTER — TELEPHONE (OUTPATIENT)
Dept: ENDOCRINOLOGY | Facility: CLINIC | Age: 46
End: 2021-12-14
Payer: MEDICARE

## 2021-12-14 DIAGNOSIS — G47.33 OBSTRUCTIVE SLEEP APNEA (ADULT) (PEDIATRIC): Primary | ICD-10-CM

## 2021-12-14 DIAGNOSIS — E11.65 TYPE 2 DIABETES MELLITUS WITH HYPERGLYCEMIA, WITH LONG-TERM CURRENT USE OF INSULIN (H): ICD-10-CM

## 2021-12-14 DIAGNOSIS — Z79.4 TYPE 2 DIABETES MELLITUS WITH HYPERGLYCEMIA, WITH LONG-TERM CURRENT USE OF INSULIN (H): ICD-10-CM

## 2021-12-14 NOTE — TELEPHONE ENCOUNTER
kellym to schedule 4 week follow up with JENNIFER MEJIA, left call center phone number and sent FlagTapt.

## 2021-12-15 RX ORDER — FLASH GLUCOSE SENSOR
KIT MISCELLANEOUS
Qty: 2 EACH | Refills: 11 | Status: SHIPPED | OUTPATIENT
Start: 2021-12-15 | End: 2022-01-01

## 2021-12-15 RX ORDER — FLASH GLUCOSE SENSOR
1 KIT MISCELLANEOUS
Qty: 6 EACH | Refills: 3 | Status: SHIPPED | OUTPATIENT
Start: 2021-12-15 | End: 2022-01-01 | Stop reason: ALTCHOICE

## 2021-12-15 NOTE — TELEPHONE ENCOUNTER
[facility] Call Center    Phone Message    May a detailed message be left on voicemail: yes     Reason for Call: Medication Refill Request    Has the patient contacted the pharmacy for the refill? Yes   Name of medication being requested: Continuous Blood Gluc Sensor (FREESTYLE BELLO 14 DAY SENSOR) [facility]  Provider who prescribed the medication: Kyle  Pharmacy: [facility] - [place], [place] - [address]   Date medication is needed: ASAP, Per pt she is needing her refill for this Rx to be resent to pharmacy, but by Dr. Wright. Per pt she sees Dr. Ascecnio only for her weight management and through the weight management clinic. Also per pt Medicare won t cover this Rx unless it is sent by Dr. Wright who prescribed it. Please resend under Kyle, and call pt back to confirm this has been done.         Action Taken: Message routed to:  Other: endocrine    Travel Screening: Not Applicable

## 2021-12-15 NOTE — TELEPHONE ENCOUNTER
Forwarding to Dr. Damon who is managing patient's care now.    Layton CARDONA RN....12/15/2021 2:21 PM

## 2021-12-15 NOTE — TELEPHONE ENCOUNTER
Pt calling stating they have been out of sensors for a few days. Routing high priority due to lack of medication.  Deja Barrera-  Steve

## 2021-12-15 NOTE — TELEPHONE ENCOUNTER
Refilled rx under Dr. Wright. Left message informing patient that it was sent.    Layton CARDONA RN....12/15/2021 3:35 PM

## 2021-12-29 NOTE — TELEPHONE ENCOUNTER
FUTURE VISIT INFORMATION      FUTURE VISIT INFORMATION:    Date: 2/3/22    Time: 1:20pm    Location: [facility]  REFERRAL INFORMATION:    Referring provider:  Ursula    Referring providers clinic:  [facility]    Reason for visit/diagnosis  Cataract Consultation & OCT MACULA      RECORDS REQUESTED FROM:         Clinic name Comments Records Status Imaging Status   [facility] Ov/referral 8/25/20  OV/notes 2/11/14-8/25/20 EPIC

## 2022-01-01 ENCOUNTER — TELEPHONE (OUTPATIENT)
Dept: CARDIOLOGY | Facility: CLINIC | Age: 47
End: 2022-01-01

## 2022-01-01 ENCOUNTER — TELEPHONE (OUTPATIENT)
Dept: ENDOCRINOLOGY | Facility: CLINIC | Age: 47
End: 2022-01-01

## 2022-01-01 ENCOUNTER — PRE VISIT (OUTPATIENT)
Dept: SURGERY | Facility: CLINIC | Age: 47
End: 2022-01-01
Payer: MEDICARE

## 2022-01-01 ENCOUNTER — DOCUMENTATION ONLY (OUTPATIENT)
Dept: OPHTHALMOLOGY | Facility: CLINIC | Age: 47
End: 2022-01-01
Payer: MEDICARE

## 2022-01-01 ENCOUNTER — ANCILLARY PROCEDURE (OUTPATIENT)
Dept: CARDIOLOGY | Facility: CLINIC | Age: 47
End: 2022-01-01
Attending: INTERNAL MEDICINE
Payer: MEDICARE

## 2022-01-01 ENCOUNTER — TELEPHONE (OUTPATIENT)
Dept: OPHTHALMOLOGY | Facility: CLINIC | Age: 47
End: 2022-01-01

## 2022-01-01 ENCOUNTER — PRE VISIT (OUTPATIENT)
Dept: CARDIOLOGY | Facility: CLINIC | Age: 47
End: 2022-01-01

## 2022-01-01 ENCOUNTER — PATIENT OUTREACH (OUTPATIENT)
Dept: CARDIOLOGY | Facility: CLINIC | Age: 47
End: 2022-01-01

## 2022-01-01 ENCOUNTER — APPOINTMENT (OUTPATIENT)
Dept: PHYSICAL THERAPY | Facility: CLINIC | Age: 47
DRG: 292 | End: 2022-01-01
Attending: STUDENT IN AN ORGANIZED HEALTH CARE EDUCATION/TRAINING PROGRAM
Payer: MEDICARE

## 2022-01-01 ENCOUNTER — OFFICE VISIT (OUTPATIENT)
Dept: OPHTHALMOLOGY | Facility: CLINIC | Age: 47
End: 2022-01-01
Payer: MEDICARE

## 2022-01-01 ENCOUNTER — LAB (OUTPATIENT)
Dept: LAB | Facility: CLINIC | Age: 47
End: 2022-01-01
Payer: MEDICARE

## 2022-01-01 ENCOUNTER — TELEPHONE (OUTPATIENT)
Dept: EDUCATION SERVICES | Facility: CLINIC | Age: 47
End: 2022-01-01

## 2022-01-01 ENCOUNTER — OFFICE VISIT (OUTPATIENT)
Dept: ENDOCRINOLOGY | Facility: CLINIC | Age: 47
End: 2022-01-01
Payer: MEDICARE

## 2022-01-01 ENCOUNTER — HOSPITAL ENCOUNTER (INPATIENT)
Facility: CLINIC | Age: 47
LOS: 7 days | Discharge: HOME HEALTH | DRG: 292 | End: 2023-01-01
Attending: EMERGENCY MEDICINE | Admitting: INTERNAL MEDICINE
Payer: MEDICARE

## 2022-01-01 ENCOUNTER — LAB (OUTPATIENT)
Dept: LAB | Facility: CLINIC | Age: 47
End: 2022-01-01

## 2022-01-01 ENCOUNTER — PRE VISIT (OUTPATIENT)
Dept: CARDIOLOGY | Facility: CLINIC | Age: 47
End: 2022-01-01
Payer: MEDICARE

## 2022-01-01 ENCOUNTER — APPOINTMENT (OUTPATIENT)
Dept: OCCUPATIONAL THERAPY | Facility: CLINIC | Age: 47
DRG: 292 | End: 2022-01-01
Payer: MEDICARE

## 2022-01-01 ENCOUNTER — APPOINTMENT (OUTPATIENT)
Dept: GENERAL RADIOLOGY | Facility: CLINIC | Age: 47
DRG: 292 | End: 2022-01-01
Attending: NURSE PRACTITIONER
Payer: MEDICARE

## 2022-01-01 ENCOUNTER — TELEPHONE (OUTPATIENT)
Dept: OPHTHALMOLOGY | Facility: CLINIC | Age: 47
End: 2022-01-01
Payer: MEDICARE

## 2022-01-01 ENCOUNTER — TELEPHONE (OUTPATIENT)
Dept: NEPHROLOGY | Facility: CLINIC | Age: 47
End: 2022-01-01

## 2022-01-01 ENCOUNTER — HEALTH MAINTENANCE LETTER (OUTPATIENT)
Age: 47
End: 2022-01-01

## 2022-01-01 ENCOUNTER — ALLIED HEALTH/NURSE VISIT (OUTPATIENT)
Dept: OPHTHALMOLOGY | Facility: CLINIC | Age: 47
End: 2022-01-01
Attending: OPHTHALMOLOGY
Payer: MEDICARE

## 2022-01-01 ENCOUNTER — APPOINTMENT (OUTPATIENT)
Dept: PHYSICAL THERAPY | Facility: CLINIC | Age: 47
DRG: 292 | End: 2022-01-01
Payer: MEDICARE

## 2022-01-01 VITALS
SYSTOLIC BLOOD PRESSURE: 141 MMHG | BODY MASS INDEX: 71.53 KG/M2 | WEIGHT: 293 LBS | HEART RATE: 98 BPM | DIASTOLIC BLOOD PRESSURE: 97 MMHG | OXYGEN SATURATION: 95 %

## 2022-01-01 VITALS
HEIGHT: 66 IN | SYSTOLIC BLOOD PRESSURE: 153 MMHG | TEMPERATURE: 99 F | WEIGHT: 293 LBS | BODY MASS INDEX: 47.09 KG/M2 | OXYGEN SATURATION: 94 % | DIASTOLIC BLOOD PRESSURE: 99 MMHG | HEART RATE: 87 BPM

## 2022-01-01 VITALS
HEART RATE: 52 BPM | WEIGHT: 293 LBS | SYSTOLIC BLOOD PRESSURE: 106 MMHG | OXYGEN SATURATION: 97 % | DIASTOLIC BLOOD PRESSURE: 83 MMHG | HEIGHT: 65 IN | BODY MASS INDEX: 48.82 KG/M2

## 2022-01-01 VITALS
DIASTOLIC BLOOD PRESSURE: 84 MMHG | OXYGEN SATURATION: 99 % | SYSTOLIC BLOOD PRESSURE: 153 MMHG | HEART RATE: 71 BPM | WEIGHT: 293 LBS | BODY MASS INDEX: 74.23 KG/M2

## 2022-01-01 DIAGNOSIS — I50.33 ACUTE ON CHRONIC HEART FAILURE WITH PRESERVED EJECTION FRACTION (H): Primary | ICD-10-CM

## 2022-01-01 DIAGNOSIS — E11.9 DIABETES MELLITUS, TYPE 2 (H): ICD-10-CM

## 2022-01-01 DIAGNOSIS — I50.32 CHRONIC DIASTOLIC HEART FAILURE (H): ICD-10-CM

## 2022-01-01 DIAGNOSIS — Z79.4 TYPE 2 DIABETES MELLITUS WITH HYPERGLYCEMIA, WITH LONG-TERM CURRENT USE OF INSULIN (H): ICD-10-CM

## 2022-01-01 DIAGNOSIS — I50.32 CHRONIC DIASTOLIC HEART FAILURE (H): Primary | ICD-10-CM

## 2022-01-01 DIAGNOSIS — I10 ESSENTIAL HYPERTENSION: ICD-10-CM

## 2022-01-01 DIAGNOSIS — M79.89 LEG SWELLING: ICD-10-CM

## 2022-01-01 DIAGNOSIS — E87.70 HYPERVOLEMIA DUE TO CONGESTIVE HEART FAILURE (H): ICD-10-CM

## 2022-01-01 DIAGNOSIS — M79.2 NEUROPATHIC PAIN OF LOWER EXTREMITY, UNSPECIFIED LATERALITY: ICD-10-CM

## 2022-01-01 DIAGNOSIS — Z79.4 TYPE 2 DIABETES MELLITUS WITH HYPERGLYCEMIA, WITH LONG-TERM CURRENT USE OF INSULIN (H): Primary | ICD-10-CM

## 2022-01-01 DIAGNOSIS — Z79.01 LONG TERM CURRENT USE OF ANTICOAGULANT THERAPY: Primary | ICD-10-CM

## 2022-01-01 DIAGNOSIS — E78.5 DYSLIPIDEMIA: ICD-10-CM

## 2022-01-01 DIAGNOSIS — Z11.59 ENCOUNTER FOR SCREENING FOR OTHER VIRAL DISEASES: Primary | ICD-10-CM

## 2022-01-01 DIAGNOSIS — E66.01 MORBID OBESITY (H): ICD-10-CM

## 2022-01-01 DIAGNOSIS — J45.20 MILD INTERMITTENT ASTHMA WITHOUT COMPLICATION: ICD-10-CM

## 2022-01-01 DIAGNOSIS — I48.0 PAROXYSMAL ATRIAL FIBRILLATION (H): ICD-10-CM

## 2022-01-01 DIAGNOSIS — Z79.01 CHRONIC ANTICOAGULATION: ICD-10-CM

## 2022-01-01 DIAGNOSIS — E11.65 TYPE 2 DIABETES MELLITUS WITH HYPERGLYCEMIA, WITH LONG-TERM CURRENT USE OF INSULIN (H): ICD-10-CM

## 2022-01-01 DIAGNOSIS — E11.9 TYPE 2 DIABETES MELLITUS (H): ICD-10-CM

## 2022-01-01 DIAGNOSIS — E78.2 MIXED HYPERLIPIDEMIA: ICD-10-CM

## 2022-01-01 DIAGNOSIS — E11.9 DIABETES MELLITUS WITHOUT COMPLICATION (H): Primary | ICD-10-CM

## 2022-01-01 DIAGNOSIS — I48.91 ATRIAL FIBRILLATION WITH RAPID VENTRICULAR RESPONSE (H): ICD-10-CM

## 2022-01-01 DIAGNOSIS — I50.33 ACUTE ON CHRONIC HEART FAILURE WITH PRESERVED EJECTION FRACTION (H): ICD-10-CM

## 2022-01-01 DIAGNOSIS — R06.02 SOB (SHORTNESS OF BREATH): ICD-10-CM

## 2022-01-01 DIAGNOSIS — B35.1 DERMATOPHYTOSIS OF NAIL: ICD-10-CM

## 2022-01-01 DIAGNOSIS — Z96.1 PSEUDOPHAKIA OF RIGHT EYE: ICD-10-CM

## 2022-01-01 DIAGNOSIS — Z96.1 PSEUDOPHAKIA OF LEFT EYE: Primary | ICD-10-CM

## 2022-01-01 DIAGNOSIS — I48.91 ATRIAL FIBRILLATION (H): ICD-10-CM

## 2022-01-01 DIAGNOSIS — E11.9 DIABETES MELLITUS WITHOUT COMPLICATION (H): ICD-10-CM

## 2022-01-01 DIAGNOSIS — E11.3292 MILD NONPROLIFERATIVE DIABETIC RETINOPATHY OF LEFT EYE WITHOUT MACULAR EDEMA ASSOCIATED WITH TYPE 2 DIABETES MELLITUS (H): ICD-10-CM

## 2022-01-01 DIAGNOSIS — E11.65 TYPE 2 DIABETES MELLITUS WITH HYPERGLYCEMIA, WITH LONG-TERM CURRENT USE OF INSULIN (H): Primary | ICD-10-CM

## 2022-01-01 DIAGNOSIS — E89.0 HYPOTHYROIDISM FOLLOWING RADIOIODINE THERAPY: ICD-10-CM

## 2022-01-01 DIAGNOSIS — E66.813 CLASS 3 OBESITY: ICD-10-CM

## 2022-01-01 DIAGNOSIS — R06.02 SHORTNESS OF BREATH: ICD-10-CM

## 2022-01-01 DIAGNOSIS — R80.9 MICROALBUMINURIA DUE TO TYPE 2 DIABETES MELLITUS (H): ICD-10-CM

## 2022-01-01 DIAGNOSIS — E11.29 MICROALBUMINURIA DUE TO TYPE 2 DIABETES MELLITUS (H): ICD-10-CM

## 2022-01-01 DIAGNOSIS — B35.1 ONYCHOMYCOSIS: ICD-10-CM

## 2022-01-01 DIAGNOSIS — I50.9 HYPERVOLEMIA DUE TO CONGESTIVE HEART FAILURE (H): ICD-10-CM

## 2022-01-01 DIAGNOSIS — I50.23 ACUTE ON CHRONIC SYSTOLIC HEART FAILURE (H): ICD-10-CM

## 2022-01-01 DIAGNOSIS — I42.0 DILATED CARDIOMYOPATHY (H): ICD-10-CM

## 2022-01-01 DIAGNOSIS — E11.42 TYPE 2 DIABETES MELLITUS WITH DIABETIC POLYNEUROPATHY, WITHOUT LONG-TERM CURRENT USE OF INSULIN (H): ICD-10-CM

## 2022-01-01 DIAGNOSIS — B35.3 TINEA PEDIS OF BOTH FEET: ICD-10-CM

## 2022-01-01 DIAGNOSIS — R06.02 SHORTNESS OF BREATH: Primary | ICD-10-CM

## 2022-01-01 DIAGNOSIS — I50.23 ACUTE ON CHRONIC HFREF (HEART FAILURE WITH REDUCED EJECTION FRACTION) (H): ICD-10-CM

## 2022-01-01 DIAGNOSIS — H11.433 CONJUNCTIVAL HYPEREMIA OF BOTH EYES: ICD-10-CM

## 2022-01-01 DIAGNOSIS — Z96.1 PSEUDOPHAKIA OF RIGHT EYE: Primary | ICD-10-CM

## 2022-01-01 LAB
ALBUMIN SERPL BCG-MCNC: 2.4 G/DL (ref 3.5–5.2)
ALBUMIN SERPL BCG-MCNC: 2.6 G/DL (ref 3.5–5.2)
ALBUMIN SERPL BCG-MCNC: 2.8 G/DL (ref 3.5–5.2)
ALBUMIN SERPL BCG-MCNC: 2.8 G/DL (ref 3.5–5.2)
ALBUMIN SERPL BCG-MCNC: 2.9 G/DL (ref 3.5–5.2)
ALBUMIN SERPL BCG-MCNC: 3.3 G/DL (ref 3.5–5.2)
ALBUMIN SERPL-MCNC: 2.8 G/DL (ref 3.4–5)
ALP SERPL-CCNC: 103 U/L (ref 35–104)
ALP SERPL-CCNC: 142 U/L (ref 35–104)
ALP SERPL-CCNC: 148 U/L (ref 35–104)
ALP SERPL-CCNC: 151 U/L (ref 35–104)
ALP SERPL-CCNC: 161 U/L (ref 35–104)
ALP SERPL-CCNC: 162 U/L (ref 35–104)
ALP SERPL-CCNC: 167 U/L (ref 35–104)
ALP SERPL-CCNC: 176 U/L (ref 35–104)
ALP SERPL-CCNC: 84 U/L (ref 40–150)
ALT SERPL W P-5'-P-CCNC: 33 U/L (ref 10–35)
ALT SERPL W P-5'-P-CCNC: 34 U/L (ref 10–35)
ALT SERPL W P-5'-P-CCNC: 34 U/L (ref 10–35)
ALT SERPL W P-5'-P-CCNC: 36 U/L (ref 10–35)
ALT SERPL W P-5'-P-CCNC: 38 U/L (ref 10–35)
ALT SERPL W P-5'-P-CCNC: 39 U/L (ref 10–35)
ALT SERPL W P-5'-P-CCNC: 39 U/L (ref 10–35)
ALT SERPL W P-5'-P-CCNC: 40 U/L (ref 10–35)
ALT SERPL W P-5'-P-CCNC: 51 U/L (ref 0–50)
ANION GAP SERPL CALCULATED.3IONS-SCNC: 10 MMOL/L (ref 7–15)
ANION GAP SERPL CALCULATED.3IONS-SCNC: 11 MMOL/L (ref 7–15)
ANION GAP SERPL CALCULATED.3IONS-SCNC: 12 MMOL/L (ref 7–15)
ANION GAP SERPL CALCULATED.3IONS-SCNC: 13 MMOL/L (ref 7–15)
ANION GAP SERPL CALCULATED.3IONS-SCNC: 3 MMOL/L (ref 3–14)
ANION GAP SERPL CALCULATED.3IONS-SCNC: 9 MMOL/L (ref 7–15)
AST SERPL W P-5'-P-CCNC: 17 U/L (ref 0–45)
AST SERPL W P-5'-P-CCNC: 22 U/L (ref 10–35)
AST SERPL W P-5'-P-CCNC: 23 U/L (ref 10–35)
AST SERPL W P-5'-P-CCNC: 32 U/L (ref 10–35)
AST SERPL W P-5'-P-CCNC: 36 U/L (ref 10–35)
AST SERPL W P-5'-P-CCNC: 36 U/L (ref 10–35)
AST SERPL W P-5'-P-CCNC: 43 U/L (ref 10–35)
AST SERPL W P-5'-P-CCNC: 43 U/L (ref 10–35)
AST SERPL W P-5'-P-CCNC: 47 U/L (ref 10–35)
ATRIAL RATE - MUSE: 147 BPM
BASOPHILS # BLD AUTO: 0 10E3/UL (ref 0–0.2)
BASOPHILS NFR BLD AUTO: 0 %
BILIRUB SERPL-MCNC: 0.5 MG/DL (ref 0.2–1.3)
BILIRUB SERPL-MCNC: 0.6 MG/DL
BILIRUB SERPL-MCNC: 0.6 MG/DL
BILIRUB SERPL-MCNC: 0.7 MG/DL
BILIRUB SERPL-MCNC: 0.7 MG/DL
BILIRUB SERPL-MCNC: 0.8 MG/DL
BILIRUB SERPL-MCNC: 1.1 MG/DL
BILIRUB SERPL-MCNC: 1.3 MG/DL
BILIRUB SERPL-MCNC: 1.5 MG/DL
BUN SERPL-MCNC: 17 MG/DL (ref 7–30)
BUN SERPL-MCNC: 25.1 MG/DL (ref 6–20)
BUN SERPL-MCNC: 44.8 MG/DL (ref 6–20)
BUN SERPL-MCNC: 50.1 MG/DL (ref 6–20)
BUN SERPL-MCNC: 56.1 MG/DL (ref 6–20)
BUN SERPL-MCNC: 56.9 MG/DL (ref 6–20)
BUN SERPL-MCNC: 58.3 MG/DL (ref 6–20)
BUN SERPL-MCNC: 58.5 MG/DL (ref 6–20)
BUN SERPL-MCNC: 58.8 MG/DL (ref 6–20)
BUN SERPL-MCNC: 58.9 MG/DL (ref 6–20)
BUN SERPL-MCNC: 60.9 MG/DL (ref 6–20)
BUN SERPL-MCNC: 62 MG/DL (ref 6–20)
BUN SERPL-MCNC: 62.1 MG/DL (ref 6–20)
BUN SERPL-MCNC: 62.4 MG/DL (ref 6–20)
BUN SERPL-MCNC: 64.2 MG/DL (ref 6–20)
BUN SERPL-MCNC: 70.5 MG/DL (ref 6–20)
CALCIUM SERPL-MCNC: 8.2 MG/DL (ref 8.6–10)
CALCIUM SERPL-MCNC: 8.6 MG/DL (ref 8.6–10)
CALCIUM SERPL-MCNC: 8.6 MG/DL (ref 8.6–10)
CALCIUM SERPL-MCNC: 8.7 MG/DL (ref 8.6–10)
CALCIUM SERPL-MCNC: 8.8 MG/DL (ref 8.6–10)
CALCIUM SERPL-MCNC: 8.9 MG/DL (ref 8.6–10)
CALCIUM SERPL-MCNC: 8.9 MG/DL (ref 8.6–10)
CALCIUM SERPL-MCNC: 9 MG/DL (ref 8.6–10)
CALCIUM SERPL-MCNC: 9.3 MG/DL (ref 8.5–10.1)
CALCIUM SERPL-MCNC: 9.4 MG/DL (ref 8.6–10)
CHLORIDE BLD-SCNC: 103 MMOL/L (ref 94–109)
CHLORIDE SERPL-SCNC: 101 MMOL/L (ref 98–107)
CHLORIDE SERPL-SCNC: 88 MMOL/L (ref 98–107)
CHLORIDE SERPL-SCNC: 89 MMOL/L (ref 98–107)
CHLORIDE SERPL-SCNC: 90 MMOL/L (ref 98–107)
CHLORIDE SERPL-SCNC: 90 MMOL/L (ref 98–107)
CHLORIDE SERPL-SCNC: 91 MMOL/L (ref 98–107)
CHLORIDE SERPL-SCNC: 91 MMOL/L (ref 98–107)
CHLORIDE SERPL-SCNC: 92 MMOL/L (ref 98–107)
CHLORIDE SERPL-SCNC: 92 MMOL/L (ref 98–107)
CHLORIDE SERPL-SCNC: 93 MMOL/L (ref 98–107)
CHLORIDE SERPL-SCNC: 93 MMOL/L (ref 98–107)
CHLORIDE SERPL-SCNC: 94 MMOL/L (ref 98–107)
CHLORIDE SERPL-SCNC: 95 MMOL/L (ref 98–107)
CHLORIDE SERPL-SCNC: 95 MMOL/L (ref 98–107)
CHLORIDE SERPL-SCNC: 98 MMOL/L (ref 98–107)
CHOLEST SERPL-MCNC: 179 MG/DL
CO2 SERPL-SCNC: 29 MMOL/L (ref 20–32)
CREAT SERPL-MCNC: 0.96 MG/DL (ref 0.52–1.04)
CREAT SERPL-MCNC: 1.25 MG/DL (ref 0.51–0.95)
CREAT SERPL-MCNC: 1.42 MG/DL (ref 0.51–0.95)
CREAT SERPL-MCNC: 1.43 MG/DL (ref 0.51–0.95)
CREAT SERPL-MCNC: 1.47 MG/DL (ref 0.51–0.95)
CREAT SERPL-MCNC: 1.53 MG/DL (ref 0.51–0.95)
CREAT SERPL-MCNC: 1.58 MG/DL (ref 0.51–0.95)
CREAT SERPL-MCNC: 1.58 MG/DL (ref 0.51–0.95)
CREAT SERPL-MCNC: 1.61 MG/DL (ref 0.51–0.95)
CREAT SERPL-MCNC: 1.62 MG/DL (ref 0.51–0.95)
CREAT SERPL-MCNC: 1.65 MG/DL (ref 0.51–0.95)
CREAT SERPL-MCNC: 1.71 MG/DL (ref 0.51–0.95)
CREAT SERPL-MCNC: 1.72 MG/DL (ref 0.51–0.95)
CREAT SERPL-MCNC: 1.82 MG/DL (ref 0.51–0.95)
CREAT SERPL-MCNC: 1.85 MG/DL (ref 0.51–0.95)
CREAT SERPL-MCNC: 1.99 MG/DL (ref 0.51–0.95)
CREAT UR-MCNC: 92 MG/DL
DEPRECATED HCO3 PLAS-SCNC: 27 MMOL/L (ref 22–29)
DEPRECATED HCO3 PLAS-SCNC: 29 MMOL/L (ref 22–29)
DEPRECATED HCO3 PLAS-SCNC: 30 MMOL/L (ref 22–29)
DEPRECATED HCO3 PLAS-SCNC: 31 MMOL/L (ref 22–29)
DEPRECATED HCO3 PLAS-SCNC: 33 MMOL/L (ref 22–29)
DEPRECATED HCO3 PLAS-SCNC: 34 MMOL/L (ref 22–29)
DEPRECATED HCO3 PLAS-SCNC: 34 MMOL/L (ref 22–29)
DEPRECATED HCO3 PLAS-SCNC: 35 MMOL/L (ref 22–29)
DEPRECATED HCO3 PLAS-SCNC: 36 MMOL/L (ref 22–29)
DIASTOLIC BLOOD PRESSURE - MUSE: NORMAL MMHG
EOSINOPHIL # BLD AUTO: 0 10E3/UL (ref 0–0.7)
EOSINOPHIL NFR BLD AUTO: 0 %
ERYTHROCYTE [DISTWIDTH] IN BLOOD BY AUTOMATED COUNT: 13.2 % (ref 10–15)
ERYTHROCYTE [DISTWIDTH] IN BLOOD BY AUTOMATED COUNT: 16.2 % (ref 10–15)
ERYTHROCYTE [DISTWIDTH] IN BLOOD BY AUTOMATED COUNT: 17 % (ref 10–15)
ERYTHROCYTE [DISTWIDTH] IN BLOOD BY AUTOMATED COUNT: 17.1 % (ref 10–15)
ERYTHROCYTE [DISTWIDTH] IN BLOOD BY AUTOMATED COUNT: 17.2 % (ref 10–15)
ERYTHROCYTE [DISTWIDTH] IN BLOOD BY AUTOMATED COUNT: 17.3 % (ref 10–15)
ERYTHROCYTE [DISTWIDTH] IN BLOOD BY AUTOMATED COUNT: 17.6 % (ref 10–15)
ERYTHROCYTE [DISTWIDTH] IN BLOOD BY AUTOMATED COUNT: 17.6 % (ref 10–15)
ERYTHROCYTE [DISTWIDTH] IN BLOOD BY AUTOMATED COUNT: 17.9 % (ref 10–15)
FASTING STATUS PATIENT QL REPORTED: NO
FLUAV RNA SPEC QL NAA+PROBE: NEGATIVE
FLUBV RNA RESP QL NAA+PROBE: NEGATIVE
GFR SERPL CREATININE-BSD FRML MDRD: 30 ML/MIN/1.73M2
GFR SERPL CREATININE-BSD FRML MDRD: 33 ML/MIN/1.73M2
GFR SERPL CREATININE-BSD FRML MDRD: 34 ML/MIN/1.73M2
GFR SERPL CREATININE-BSD FRML MDRD: 36 ML/MIN/1.73M2
GFR SERPL CREATININE-BSD FRML MDRD: 36 ML/MIN/1.73M2
GFR SERPL CREATININE-BSD FRML MDRD: 38 ML/MIN/1.73M2
GFR SERPL CREATININE-BSD FRML MDRD: 39 ML/MIN/1.73M2
GFR SERPL CREATININE-BSD FRML MDRD: 39 ML/MIN/1.73M2
GFR SERPL CREATININE-BSD FRML MDRD: 40 ML/MIN/1.73M2
GFR SERPL CREATININE-BSD FRML MDRD: 40 ML/MIN/1.73M2
GFR SERPL CREATININE-BSD FRML MDRD: 42 ML/MIN/1.73M2
GFR SERPL CREATININE-BSD FRML MDRD: 44 ML/MIN/1.73M2
GFR SERPL CREATININE-BSD FRML MDRD: 45 ML/MIN/1.73M2
GFR SERPL CREATININE-BSD FRML MDRD: 45 ML/MIN/1.73M2
GFR SERPL CREATININE-BSD FRML MDRD: 53 ML/MIN/1.73M2
GFR SERPL CREATININE-BSD FRML MDRD: 73 ML/MIN/1.73M2
GLUCOSE BLD-MCNC: 163 MG/DL (ref 70–99)
GLUCOSE BLDC GLUCOMTR-MCNC: 100 MG/DL (ref 70–99)
GLUCOSE BLDC GLUCOMTR-MCNC: 101 MG/DL (ref 70–99)
GLUCOSE BLDC GLUCOMTR-MCNC: 106 MG/DL (ref 70–99)
GLUCOSE BLDC GLUCOMTR-MCNC: 111 MG/DL (ref 70–99)
GLUCOSE BLDC GLUCOMTR-MCNC: 116 MG/DL (ref 70–99)
GLUCOSE BLDC GLUCOMTR-MCNC: 121 MG/DL (ref 70–99)
GLUCOSE BLDC GLUCOMTR-MCNC: 132 MG/DL (ref 70–99)
GLUCOSE BLDC GLUCOMTR-MCNC: 136 MG/DL (ref 70–99)
GLUCOSE BLDC GLUCOMTR-MCNC: 164 MG/DL (ref 70–99)
GLUCOSE BLDC GLUCOMTR-MCNC: 174 MG/DL (ref 70–99)
GLUCOSE BLDC GLUCOMTR-MCNC: 195 MG/DL (ref 70–99)
GLUCOSE BLDC GLUCOMTR-MCNC: 199 MG/DL (ref 70–99)
GLUCOSE BLDC GLUCOMTR-MCNC: 216 MG/DL (ref 70–99)
GLUCOSE BLDC GLUCOMTR-MCNC: 245 MG/DL (ref 70–99)
GLUCOSE BLDC GLUCOMTR-MCNC: 246 MG/DL (ref 70–99)
GLUCOSE BLDC GLUCOMTR-MCNC: 246 MG/DL (ref 70–99)
GLUCOSE BLDC GLUCOMTR-MCNC: 250 MG/DL (ref 70–99)
GLUCOSE BLDC GLUCOMTR-MCNC: 262 MG/DL (ref 70–99)
GLUCOSE BLDC GLUCOMTR-MCNC: 266 MG/DL (ref 70–99)
GLUCOSE BLDC GLUCOMTR-MCNC: 267 MG/DL (ref 70–99)
GLUCOSE BLDC GLUCOMTR-MCNC: 276 MG/DL (ref 70–99)
GLUCOSE BLDC GLUCOMTR-MCNC: 281 MG/DL (ref 70–99)
GLUCOSE BLDC GLUCOMTR-MCNC: 298 MG/DL (ref 70–99)
GLUCOSE BLDC GLUCOMTR-MCNC: 298 MG/DL (ref 70–99)
GLUCOSE BLDC GLUCOMTR-MCNC: 323 MG/DL (ref 70–99)
GLUCOSE BLDC GLUCOMTR-MCNC: 331 MG/DL (ref 70–99)
GLUCOSE BLDC GLUCOMTR-MCNC: 338 MG/DL (ref 70–99)
GLUCOSE BLDC GLUCOMTR-MCNC: 342 MG/DL (ref 70–99)
GLUCOSE BLDC GLUCOMTR-MCNC: 40 MG/DL (ref 70–99)
GLUCOSE BLDC GLUCOMTR-MCNC: 40 MG/DL (ref 70–99)
GLUCOSE BLDC GLUCOMTR-MCNC: 46 MG/DL (ref 70–99)
GLUCOSE BLDC GLUCOMTR-MCNC: 578 MG/DL (ref 70–99)
GLUCOSE BLDC GLUCOMTR-MCNC: 58 MG/DL (ref 70–99)
GLUCOSE BLDC GLUCOMTR-MCNC: 60 MG/DL (ref 70–99)
GLUCOSE BLDC GLUCOMTR-MCNC: 64 MG/DL (ref 70–99)
GLUCOSE BLDC GLUCOMTR-MCNC: 65 MG/DL (ref 70–99)
GLUCOSE BLDC GLUCOMTR-MCNC: 66 MG/DL (ref 70–99)
GLUCOSE BLDC GLUCOMTR-MCNC: 72 MG/DL (ref 70–99)
GLUCOSE BLDC GLUCOMTR-MCNC: 78 MG/DL (ref 70–99)
GLUCOSE BLDC GLUCOMTR-MCNC: 78 MG/DL (ref 70–99)
GLUCOSE BLDC GLUCOMTR-MCNC: 79 MG/DL (ref 70–99)
GLUCOSE BLDC GLUCOMTR-MCNC: 79 MG/DL (ref 70–99)
GLUCOSE BLDC GLUCOMTR-MCNC: 81 MG/DL (ref 70–99)
GLUCOSE BLDC GLUCOMTR-MCNC: 82 MG/DL (ref 70–99)
GLUCOSE BLDC GLUCOMTR-MCNC: 88 MG/DL (ref 70–99)
GLUCOSE BLDC GLUCOMTR-MCNC: 88 MG/DL (ref 70–99)
GLUCOSE BLDC GLUCOMTR-MCNC: 97 MG/DL (ref 70–99)
GLUCOSE BLDC GLUCOMTR-MCNC: 98 MG/DL (ref 70–99)
GLUCOSE SERPL-MCNC: 119 MG/DL (ref 70–99)
GLUCOSE SERPL-MCNC: 146 MG/DL (ref 70–99)
GLUCOSE SERPL-MCNC: 162 MG/DL (ref 70–99)
GLUCOSE SERPL-MCNC: 203 MG/DL (ref 70–99)
GLUCOSE SERPL-MCNC: 203 MG/DL (ref 70–99)
GLUCOSE SERPL-MCNC: 235 MG/DL (ref 70–99)
GLUCOSE SERPL-MCNC: 259 MG/DL (ref 70–99)
GLUCOSE SERPL-MCNC: 308 MG/DL (ref 70–99)
GLUCOSE SERPL-MCNC: 311 MG/DL (ref 70–99)
GLUCOSE SERPL-MCNC: 396 MG/DL (ref 70–99)
GLUCOSE SERPL-MCNC: 41 MG/DL (ref 70–99)
GLUCOSE SERPL-MCNC: 61 MG/DL (ref 70–99)
GLUCOSE SERPL-MCNC: 61 MG/DL (ref 70–99)
GLUCOSE SERPL-MCNC: 76 MG/DL (ref 70–99)
GLUCOSE SERPL-MCNC: 94 MG/DL (ref 70–99)
HBA1C MFR BLD: 12.3 % (ref 4.3–?)
HBA1C MFR BLD: 13.5 %
HCO3 BLDV-SCNC: 38 MMOL/L (ref 21–28)
HCT VFR BLD AUTO: 48.2 % (ref 35–47)
HCT VFR BLD AUTO: 48.3 % (ref 35–47)
HCT VFR BLD AUTO: 48.5 % (ref 35–47)
HCT VFR BLD AUTO: 50.2 % (ref 35–47)
HCT VFR BLD AUTO: 50.3 % (ref 35–47)
HCT VFR BLD AUTO: 50.3 % (ref 35–47)
HCT VFR BLD AUTO: 52.8 % (ref 35–47)
HCT VFR BLD AUTO: 53.2 % (ref 35–47)
HCT VFR BLD AUTO: 53.8 % (ref 35–47)
HDLC SERPL-MCNC: 55 MG/DL
HGB BLD-MCNC: 15 G/DL (ref 11.7–15.7)
HGB BLD-MCNC: 15 G/DL (ref 11.7–15.7)
HGB BLD-MCNC: 15.1 G/DL (ref 11.7–15.7)
HGB BLD-MCNC: 15.1 G/DL (ref 11.7–15.7)
HGB BLD-MCNC: 15.2 G/DL (ref 11.7–15.7)
HGB BLD-MCNC: 15.8 G/DL (ref 11.7–15.7)
HGB BLD-MCNC: 16 G/DL (ref 11.7–15.7)
HGB BLD-MCNC: 16.2 G/DL (ref 11.7–15.7)
HGB BLD-MCNC: 16.4 G/DL (ref 11.7–15.7)
HOLD SPECIMEN: NORMAL
IMM GRANULOCYTES # BLD: 0.1 10E3/UL
IMM GRANULOCYTES NFR BLD: 1 %
INR PPP: 1.36 (ref 0.85–1.15)
INTERPRETATION ECG - MUSE: NORMAL
LACTATE BLD-SCNC: 0.4 MMOL/L
LACTATE SERPL-SCNC: 1.3 MMOL/L (ref 0.7–2)
LDLC SERPL CALC-MCNC: 93 MG/DL
LVEF ECHO: NORMAL
LYMPHOCYTES # BLD AUTO: 0.9 10E3/UL (ref 0.8–5.3)
LYMPHOCYTES NFR BLD AUTO: 8 %
MAGNESIUM SERPL-MCNC: 1.5 MG/DL (ref 1.7–2.3)
MAGNESIUM SERPL-MCNC: 1.5 MG/DL (ref 1.7–2.3)
MAGNESIUM SERPL-MCNC: 1.6 MG/DL (ref 1.7–2.3)
MAGNESIUM SERPL-MCNC: 1.8 MG/DL (ref 1.7–2.3)
MAGNESIUM SERPL-MCNC: 1.8 MG/DL (ref 1.7–2.3)
MAGNESIUM SERPL-MCNC: 1.9 MG/DL (ref 1.7–2.3)
MAGNESIUM SERPL-MCNC: 2 MG/DL (ref 1.7–2.3)
MAGNESIUM SERPL-MCNC: 2 MG/DL (ref 1.7–2.3)
MAGNESIUM SERPL-MCNC: 2.1 MG/DL (ref 1.7–2.3)
MAGNESIUM SERPL-MCNC: 2.2 MG/DL (ref 1.7–2.3)
MAGNESIUM SERPL-MCNC: 2.5 MG/DL (ref 1.7–2.3)
MCH RBC QN AUTO: 28.8 PG (ref 26.5–33)
MCH RBC QN AUTO: 29 PG (ref 26.5–33)
MCH RBC QN AUTO: 29.1 PG (ref 26.5–33)
MCH RBC QN AUTO: 29.3 PG (ref 26.5–33)
MCH RBC QN AUTO: 29.3 PG (ref 26.5–33)
MCH RBC QN AUTO: 29.4 PG (ref 26.5–33)
MCH RBC QN AUTO: 29.5 PG (ref 26.5–33)
MCH RBC QN AUTO: 30 PG (ref 26.5–33)
MCH RBC QN AUTO: 30.1 PG (ref 26.5–33)
MCHC RBC AUTO-ENTMCNC: 29.8 G/DL (ref 31.5–36.5)
MCHC RBC AUTO-ENTMCNC: 29.9 G/DL (ref 31.5–36.5)
MCHC RBC AUTO-ENTMCNC: 29.9 G/DL (ref 31.5–36.5)
MCHC RBC AUTO-ENTMCNC: 30 G/DL (ref 31.5–36.5)
MCHC RBC AUTO-ENTMCNC: 30.5 G/DL (ref 31.5–36.5)
MCHC RBC AUTO-ENTMCNC: 30.5 G/DL (ref 31.5–36.5)
MCHC RBC AUTO-ENTMCNC: 31.3 G/DL (ref 31.5–36.5)
MCHC RBC AUTO-ENTMCNC: 31.5 G/DL (ref 31.5–36.5)
MCHC RBC AUTO-ENTMCNC: 33 G/DL (ref 31.5–36.5)
MCV RBC AUTO: 91 FL (ref 78–100)
MCV RBC AUTO: 95 FL (ref 78–100)
MCV RBC AUTO: 95 FL (ref 78–100)
MCV RBC AUTO: 96 FL (ref 78–100)
MCV RBC AUTO: 97 FL (ref 78–100)
MCV RBC AUTO: 97 FL (ref 78–100)
MCV RBC AUTO: 98 FL (ref 78–100)
MICROALBUMIN UR-MCNC: 2150 MG/L
MICROALBUMIN/CREAT UR: 2336.96 MG/G CR (ref 0–25)
MONOCYTES # BLD AUTO: 0.9 10E3/UL (ref 0–1.3)
MONOCYTES NFR BLD AUTO: 9 %
NEUTROPHILS # BLD AUTO: 8.6 10E3/UL (ref 1.6–8.3)
NEUTROPHILS NFR BLD AUTO: 82 %
NONHDLC SERPL-MCNC: 124 MG/DL
NRBC # BLD AUTO: 0 10E3/UL
NRBC BLD AUTO-RTO: 0 /100
NT-PROBNP SERPL-MCNC: 1328 PG/ML (ref 0–450)
NT-PROBNP SERPL-MCNC: 2030 PG/ML (ref 0–450)
NT-PROBNP SERPL-MCNC: 2270 PG/ML (ref 0–450)
NT-PROBNP SERPL-MCNC: 2788 PG/ML (ref 0–450)
P AXIS - MUSE: NORMAL DEGREES
PCO2 BLDV: 63 MM HG (ref 40–50)
PH BLDV: 7.39 [PH] (ref 7.32–7.43)
PHOSPHATE SERPL-MCNC: 2.1 MG/DL (ref 2.5–4.5)
PHOSPHATE SERPL-MCNC: 2.3 MG/DL (ref 2.5–4.5)
PHOSPHATE SERPL-MCNC: 2.5 MG/DL (ref 2.5–4.5)
PHOSPHATE SERPL-MCNC: 2.6 MG/DL (ref 2.5–4.5)
PHOSPHATE SERPL-MCNC: 2.7 MG/DL (ref 2.5–4.5)
PHOSPHATE SERPL-MCNC: 2.7 MG/DL (ref 2.5–4.5)
PHOSPHATE SERPL-MCNC: 2.9 MG/DL (ref 2.5–4.5)
PHOSPHATE SERPL-MCNC: 3.3 MG/DL (ref 2.5–4.5)
PLATELET # BLD AUTO: 148 10E3/UL (ref 150–450)
PLATELET # BLD AUTO: 149 10E3/UL (ref 150–450)
PLATELET # BLD AUTO: 149 10E3/UL (ref 150–450)
PLATELET # BLD AUTO: 150 10E3/UL (ref 150–450)
PLATELET # BLD AUTO: 173 10E3/UL (ref 150–450)
PLATELET # BLD AUTO: 261 10E3/UL (ref 150–450)
PLATELET # BLD AUTO: 265 10E3/UL (ref 150–450)
PO2 BLDV: 31 MM HG (ref 25–47)
POTASSIUM BLD-SCNC: 3.5 MMOL/L (ref 3.4–5.3)
POTASSIUM SERPL-SCNC: 3.5 MMOL/L (ref 3.4–5.3)
POTASSIUM SERPL-SCNC: 3.7 MMOL/L (ref 3.4–5.3)
POTASSIUM SERPL-SCNC: 3.7 MMOL/L (ref 3.4–5.3)
POTASSIUM SERPL-SCNC: 3.8 MMOL/L (ref 3.4–5.3)
POTASSIUM SERPL-SCNC: 4 MMOL/L (ref 3.4–5.3)
POTASSIUM SERPL-SCNC: 4 MMOL/L (ref 3.4–5.3)
POTASSIUM SERPL-SCNC: 4.1 MMOL/L (ref 3.4–5.3)
POTASSIUM SERPL-SCNC: 4.3 MMOL/L (ref 3.4–5.3)
POTASSIUM SERPL-SCNC: 4.4 MMOL/L (ref 3.4–5.3)
POTASSIUM SERPL-SCNC: 4.4 MMOL/L (ref 3.4–5.3)
POTASSIUM SERPL-SCNC: 4.7 MMOL/L (ref 3.4–5.3)
POTASSIUM SERPL-SCNC: 4.8 MMOL/L (ref 3.4–5.3)
POTASSIUM SERPL-SCNC: 5 MMOL/L (ref 3.4–5.3)
POTASSIUM SERPL-SCNC: 6 MMOL/L (ref 3.4–5.3)
PR INTERVAL - MUSE: NORMAL MS
PROT SERPL-MCNC: 5.9 G/DL (ref 6.4–8.3)
PROT SERPL-MCNC: 6.1 G/DL (ref 6.4–8.3)
PROT SERPL-MCNC: 6.4 G/DL (ref 6.4–8.3)
PROT SERPL-MCNC: 6.6 G/DL (ref 6.4–8.3)
PROT SERPL-MCNC: 6.6 G/DL (ref 6.4–8.3)
PROT SERPL-MCNC: 6.7 G/DL (ref 6.4–8.3)
PROT SERPL-MCNC: 6.7 G/DL (ref 6.4–8.3)
PROT SERPL-MCNC: 7.5 G/DL (ref 6.4–8.3)
PROT SERPL-MCNC: 7.8 G/DL (ref 6.8–8.8)
QRS DURATION - MUSE: 72 MS
QT - MUSE: 262 MS
QTC - MUSE: 386 MS
R AXIS - MUSE: 145 DEGREES
RBC # BLD AUTO: 5.06 10E6/UL (ref 3.8–5.2)
RBC # BLD AUTO: 5.11 10E6/UL (ref 3.8–5.2)
RBC # BLD AUTO: 5.12 10E6/UL (ref 3.8–5.2)
RBC # BLD AUTO: 5.17 10E6/UL (ref 3.8–5.2)
RBC # BLD AUTO: 5.19 10E6/UL (ref 3.8–5.2)
RBC # BLD AUTO: 5.32 10E6/UL (ref 3.8–5.2)
RBC # BLD AUTO: 5.49 10E6/UL (ref 3.8–5.2)
RBC # BLD AUTO: 5.52 10E6/UL (ref 3.8–5.2)
RBC # BLD AUTO: 5.58 10E6/UL (ref 3.8–5.2)
RSV RNA SPEC NAA+PROBE: NEGATIVE
SAO2 % BLDV: 56 % (ref 94–100)
SARS-COV-2 RNA RESP QL NAA+PROBE: NEGATIVE
SODIUM SERPL-SCNC: 133 MMOL/L (ref 136–145)
SODIUM SERPL-SCNC: 133 MMOL/L (ref 136–145)
SODIUM SERPL-SCNC: 134 MMOL/L (ref 136–145)
SODIUM SERPL-SCNC: 134 MMOL/L (ref 136–145)
SODIUM SERPL-SCNC: 135 MMOL/L (ref 133–144)
SODIUM SERPL-SCNC: 135 MMOL/L (ref 136–145)
SODIUM SERPL-SCNC: 136 MMOL/L (ref 136–145)
SODIUM SERPL-SCNC: 137 MMOL/L (ref 136–145)
SODIUM SERPL-SCNC: 139 MMOL/L (ref 136–145)
SODIUM SERPL-SCNC: 140 MMOL/L (ref 136–145)
SODIUM SERPL-SCNC: 141 MMOL/L (ref 136–145)
SYSTOLIC BLOOD PRESSURE - MUSE: NORMAL MMHG
T AXIS - MUSE: 19 DEGREES
T4 FREE SERPL-MCNC: 0.53 NG/DL (ref 0.9–1.7)
TRIGL SERPL-MCNC: 157 MG/DL
TROPONIN T SERPL HS-MCNC: 67 NG/L
TROPONIN T SERPL HS-MCNC: 72 NG/L
TROPONIN T SERPL HS-MCNC: 74 NG/L
TSH SERPL DL<=0.005 MIU/L-ACNC: 14.3 UIU/ML (ref 0.3–4.2)
VENTRICULAR RATE- MUSE: 131 BPM
WBC # BLD AUTO: 10.2 10E3/UL (ref 4–11)
WBC # BLD AUTO: 10.5 10E3/UL (ref 4–11)
WBC # BLD AUTO: 4.3 10E3/UL (ref 4–11)
WBC # BLD AUTO: 6.5 10E3/UL (ref 4–11)
WBC # BLD AUTO: 6.8 10E3/UL (ref 4–11)
WBC # BLD AUTO: 6.9 10E3/UL (ref 4–11)
WBC # BLD AUTO: 8.9 10E3/UL (ref 4–11)
WBC # BLD AUTO: 9 10E3/UL (ref 4–11)
WBC # BLD AUTO: 9.1 10E3/UL (ref 4–11)

## 2022-01-01 PROCEDURE — 84100 ASSAY OF PHOSPHORUS: CPT

## 2022-01-01 PROCEDURE — 250N000013 HC RX MED GY IP 250 OP 250 PS 637

## 2022-01-01 PROCEDURE — G0463 HOSPITAL OUTPT CLINIC VISIT: HCPCS

## 2022-01-01 PROCEDURE — 250N000011 HC RX IP 250 OP 636

## 2022-01-01 PROCEDURE — 83735 ASSAY OF MAGNESIUM: CPT

## 2022-01-01 PROCEDURE — 999N000157 HC STATISTIC RCP TIME EA 10 MIN

## 2022-01-01 PROCEDURE — 250N000013 HC RX MED GY IP 250 OP 250 PS 637: Performed by: NURSE PRACTITIONER

## 2022-01-01 PROCEDURE — 80053 COMPREHEN METABOLIC PANEL: CPT

## 2022-01-01 PROCEDURE — 83735 ASSAY OF MAGNESIUM: CPT | Performed by: NURSE PRACTITIONER

## 2022-01-01 PROCEDURE — 80048 BASIC METABOLIC PNL TOTAL CA: CPT | Performed by: PATHOLOGY

## 2022-01-01 PROCEDURE — 36415 COLL VENOUS BLD VENIPUNCTURE: CPT | Performed by: NURSE PRACTITIONER

## 2022-01-01 PROCEDURE — 99285 EMERGENCY DEPT VISIT HI MDM: CPT | Mod: 25 | Performed by: EMERGENCY MEDICINE

## 2022-01-01 PROCEDURE — 97535 SELF CARE MNGMENT TRAINING: CPT | Mod: GO

## 2022-01-01 PROCEDURE — 94660 CPAP INITIATION&MGMT: CPT

## 2022-01-01 PROCEDURE — 250N000009 HC RX 250

## 2022-01-01 PROCEDURE — 250N000013 HC RX MED GY IP 250 OP 250 PS 637: Performed by: INTERNAL MEDICINE

## 2022-01-01 PROCEDURE — 258N000001 HC RX 258

## 2022-01-01 PROCEDURE — 80061 LIPID PANEL: CPT | Performed by: PATHOLOGY

## 2022-01-01 PROCEDURE — 36415 COLL VENOUS BLD VENIPUNCTURE: CPT

## 2022-01-01 PROCEDURE — 85025 COMPLETE CBC W/AUTO DIFF WBC: CPT | Performed by: NURSE PRACTITIONER

## 2022-01-01 PROCEDURE — 36592 COLLECT BLOOD FROM PICC: CPT

## 2022-01-01 PROCEDURE — 82043 UR ALBUMIN QUANTITATIVE: CPT | Performed by: PATHOLOGY

## 2022-01-01 PROCEDURE — 85027 COMPLETE CBC AUTOMATED: CPT

## 2022-01-01 PROCEDURE — 93010 ELECTROCARDIOGRAM REPORT: CPT | Performed by: EMERGENCY MEDICINE

## 2022-01-01 PROCEDURE — 5A09357 ASSISTANCE WITH RESPIRATORY VENTILATION, LESS THAN 24 CONSECUTIVE HOURS, CONTINUOUS POSITIVE AIRWAY PRESSURE: ICD-10-PCS | Performed by: INTERNAL MEDICINE

## 2022-01-01 PROCEDURE — 99207 PR STATISTIC IV PUSH SINGLE INITIAL SUBSTANCE: CPT | Performed by: INTERNAL MEDICINE

## 2022-01-01 PROCEDURE — 97530 THERAPEUTIC ACTIVITIES: CPT | Mod: GP

## 2022-01-01 PROCEDURE — 250N000009 HC RX 250: Performed by: STUDENT IN AN ORGANIZED HEALTH CARE EDUCATION/TRAINING PROGRAM

## 2022-01-01 PROCEDURE — 36415 COLL VENOUS BLD VENIPUNCTURE: CPT | Performed by: INTERNAL MEDICINE

## 2022-01-01 PROCEDURE — 97110 THERAPEUTIC EXERCISES: CPT | Mod: GO

## 2022-01-01 PROCEDURE — 250N000013 HC RX MED GY IP 250 OP 250 PS 637: Performed by: STUDENT IN AN ORGANIZED HEALTH CARE EDUCATION/TRAINING PROGRAM

## 2022-01-01 PROCEDURE — 83036 HEMOGLOBIN GLYCOSYLATED A1C: CPT | Performed by: PHYSICIAN ASSISTANT

## 2022-01-01 PROCEDURE — 83880 ASSAY OF NATRIURETIC PEPTIDE: CPT | Performed by: PATHOLOGY

## 2022-01-01 PROCEDURE — 83036 HEMOGLOBIN GLYCOSYLATED A1C: CPT

## 2022-01-01 PROCEDURE — 99223 1ST HOSP IP/OBS HIGH 75: CPT | Mod: AI | Performed by: INTERNAL MEDICINE

## 2022-01-01 PROCEDURE — 99233 SBSQ HOSP IP/OBS HIGH 50: CPT | Mod: GC | Performed by: INTERNAL MEDICINE

## 2022-01-01 PROCEDURE — 84439 ASSAY OF FREE THYROXINE: CPT | Performed by: NURSE PRACTITIONER

## 2022-01-01 PROCEDURE — 83735 ASSAY OF MAGNESIUM: CPT | Performed by: INTERNAL MEDICINE

## 2022-01-01 PROCEDURE — 36415 COLL VENOUS BLD VENIPUNCTURE: CPT | Performed by: PATHOLOGY

## 2022-01-01 PROCEDURE — 84132 ASSAY OF SERUM POTASSIUM: CPT

## 2022-01-01 PROCEDURE — 97140 MANUAL THERAPY 1/> REGIONS: CPT | Mod: GO

## 2022-01-01 PROCEDURE — 250N000012 HC RX MED GY IP 250 OP 636 PS 637

## 2022-01-01 PROCEDURE — 82947 ASSAY GLUCOSE BLOOD QUANT: CPT

## 2022-01-01 PROCEDURE — 85027 COMPLETE CBC AUTOMATED: CPT | Performed by: PATHOLOGY

## 2022-01-01 PROCEDURE — 93005 ELECTROCARDIOGRAM TRACING: CPT | Performed by: EMERGENCY MEDICINE

## 2022-01-01 PROCEDURE — 84443 ASSAY THYROID STIM HORMONE: CPT | Performed by: NURSE PRACTITIONER

## 2022-01-01 PROCEDURE — 83605 ASSAY OF LACTIC ACID: CPT | Performed by: INTERNAL MEDICINE

## 2022-01-01 PROCEDURE — 214N000001 HC R&B CCU UMMC

## 2022-01-01 PROCEDURE — 99214 OFFICE O/P EST MOD 30 MIN: CPT | Performed by: INTERNAL MEDICINE

## 2022-01-01 PROCEDURE — 97530 THERAPEUTIC ACTIVITIES: CPT | Mod: GO

## 2022-01-01 PROCEDURE — 71045 X-RAY EXAM CHEST 1 VIEW: CPT | Mod: 26 | Performed by: STUDENT IN AN ORGANIZED HEALTH CARE EDUCATION/TRAINING PROGRAM

## 2022-01-01 PROCEDURE — 83880 ASSAY OF NATRIURETIC PEPTIDE: CPT | Performed by: NURSE PRACTITIONER

## 2022-01-01 PROCEDURE — 258N000003 HC RX IP 258 OP 636: Performed by: INTERNAL MEDICINE

## 2022-01-01 PROCEDURE — 999N000044 HC STATISTIC CVC DRESSING CHANGE

## 2022-01-01 PROCEDURE — 99233 SBSQ HOSP IP/OBS HIGH 50: CPT | Performed by: PHYSICIAN ASSISTANT

## 2022-01-01 PROCEDURE — 97140 MANUAL THERAPY 1/> REGIONS: CPT | Mod: GO | Performed by: OCCUPATIONAL THERAPIST

## 2022-01-01 PROCEDURE — 250N000009 HC RX 250: Performed by: INTERNAL MEDICINE

## 2022-01-01 PROCEDURE — 99024 POSTOP FOLLOW-UP VISIT: CPT | Performed by: OPHTHALMOLOGY

## 2022-01-01 PROCEDURE — 80053 COMPREHEN METABOLIC PANEL: CPT | Performed by: NURSE PRACTITIONER

## 2022-01-01 PROCEDURE — 99233 SBSQ HOSP IP/OBS HIGH 50: CPT | Performed by: NURSE PRACTITIONER

## 2022-01-01 PROCEDURE — 999N000248 HC STATISTIC IV INSERT WITH US BY RN

## 2022-01-01 PROCEDURE — 84484 ASSAY OF TROPONIN QUANT: CPT

## 2022-01-01 PROCEDURE — 250N000011 HC RX IP 250 OP 636: Performed by: INTERNAL MEDICINE

## 2022-01-01 PROCEDURE — 84484 ASSAY OF TROPONIN QUANT: CPT | Performed by: NURSE PRACTITIONER

## 2022-01-01 PROCEDURE — 71045 X-RAY EXAM CHEST 1 VIEW: CPT

## 2022-01-01 PROCEDURE — 97116 GAIT TRAINING THERAPY: CPT | Mod: GP

## 2022-01-01 PROCEDURE — 82374 ASSAY BLOOD CARBON DIOXIDE: CPT

## 2022-01-01 PROCEDURE — 83605 ASSAY OF LACTIC ACID: CPT

## 2022-01-01 PROCEDURE — 36569 INSJ PICC 5 YR+ W/O IMAGING: CPT

## 2022-01-01 PROCEDURE — 82803 BLOOD GASES ANY COMBINATION: CPT

## 2022-01-01 PROCEDURE — 250N000012 HC RX MED GY IP 250 OP 636 PS 637: Performed by: NURSE PRACTITIONER

## 2022-01-01 PROCEDURE — 250N000011 HC RX IP 250 OP 636: Performed by: STUDENT IN AN ORGANIZED HEALTH CARE EDUCATION/TRAINING PROGRAM

## 2022-01-01 PROCEDURE — 99215 OFFICE O/P EST HI 40 MIN: CPT | Performed by: PHYSICIAN ASSISTANT

## 2022-01-01 PROCEDURE — 97165 OT EVAL LOW COMPLEX 30 MIN: CPT | Mod: GO

## 2022-01-01 PROCEDURE — 82310 ASSAY OF CALCIUM: CPT

## 2022-01-01 PROCEDURE — 258N000001 HC RX 258: Performed by: STUDENT IN AN ORGANIZED HEALTH CARE EDUCATION/TRAINING PROGRAM

## 2022-01-01 PROCEDURE — 83880 ASSAY OF NATRIURETIC PEPTIDE: CPT

## 2022-01-01 PROCEDURE — 85610 PROTHROMBIN TIME: CPT | Performed by: NURSE PRACTITIONER

## 2022-01-01 PROCEDURE — 999N000128 HC STATISTIC PERIPHERAL IV START W/O US GUIDANCE

## 2022-01-01 PROCEDURE — 99222 1ST HOSP IP/OBS MODERATE 55: CPT | Performed by: PHYSICIAN ASSISTANT

## 2022-01-01 PROCEDURE — 93306 TTE W/DOPPLER COMPLETE: CPT | Performed by: INTERNAL MEDICINE

## 2022-01-01 PROCEDURE — 80053 COMPREHEN METABOLIC PANEL: CPT | Performed by: PATHOLOGY

## 2022-01-01 PROCEDURE — 36416 COLLJ CAPILLARY BLOOD SPEC: CPT

## 2022-01-01 PROCEDURE — 250N000011 HC RX IP 250 OP 636: Performed by: NURSE PRACTITIONER

## 2022-01-01 PROCEDURE — 87637 SARSCOV2&INF A&B&RSV AMP PRB: CPT | Performed by: NURSE PRACTITIONER

## 2022-01-01 PROCEDURE — 97161 PT EVAL LOW COMPLEX 20 MIN: CPT | Mod: GP

## 2022-01-01 PROCEDURE — 97530 THERAPEUTIC ACTIVITIES: CPT | Mod: GP | Performed by: PHYSICAL THERAPIST

## 2022-01-01 PROCEDURE — 80051 ELECTROLYTE PANEL: CPT

## 2022-01-01 PROCEDURE — 99215 OFFICE O/P EST HI 40 MIN: CPT | Performed by: NURSE PRACTITIONER

## 2022-01-01 RX ORDER — METOPROLOL SUCCINATE 50 MG/1
50 TABLET, EXTENDED RELEASE ORAL DAILY
Qty: 90 TABLET | Refills: 3 | Status: ON HOLD | OUTPATIENT
Start: 2022-01-01 | End: 2023-01-01

## 2022-01-01 RX ORDER — BUMETANIDE 1 MG/1
5 TABLET ORAL 2 TIMES DAILY
Qty: 300 TABLET | Refills: 1 | Status: SHIPPED | OUTPATIENT
Start: 2022-01-01 | End: 2022-01-01

## 2022-01-01 RX ORDER — ISOSORBIDE DINITRATE 20 MG/1
20 TABLET ORAL 3 TIMES DAILY
Qty: 270 TABLET | Refills: 0 | Status: ON HOLD | OUTPATIENT
Start: 2022-01-01 | End: 2023-01-01

## 2022-01-01 RX ORDER — MAGNESIUM SULFATE HEPTAHYDRATE 40 MG/ML
4 INJECTION, SOLUTION INTRAVENOUS ONCE
Status: COMPLETED | OUTPATIENT
Start: 2022-01-01 | End: 2022-01-01

## 2022-01-01 RX ORDER — INSULIN GLARGINE 300 U/ML
INJECTION, SOLUTION SUBCUTANEOUS
Qty: 230 ML | Refills: 3 | Status: SHIPPED | OUTPATIENT
Start: 2022-01-01 | End: 2022-01-01 | Stop reason: ALTCHOICE

## 2022-01-01 RX ORDER — BUMETANIDE 0.25 MG/ML
5 INJECTION INTRAMUSCULAR; INTRAVENOUS ONCE
Status: COMPLETED | OUTPATIENT
Start: 2022-01-01 | End: 2022-01-01

## 2022-01-01 RX ORDER — LEVOTHYROXINE SODIUM 100 UG/1
200 TABLET ORAL WEEKLY
Status: DISCONTINUED | OUTPATIENT
Start: 2022-01-01 | End: 2022-01-01

## 2022-01-01 RX ORDER — NALOXONE HYDROCHLORIDE 0.4 MG/ML
0.2 INJECTION, SOLUTION INTRAMUSCULAR; INTRAVENOUS; SUBCUTANEOUS
Status: DISCONTINUED | OUTPATIENT
Start: 2022-01-01 | End: 2023-01-01 | Stop reason: HOSPADM

## 2022-01-01 RX ORDER — BLOOD PRESSURE TEST KIT
KIT MISCELLANEOUS
Qty: 1 KIT | Refills: 1 | Status: SHIPPED | OUTPATIENT
Start: 2022-01-01

## 2022-01-01 RX ORDER — GABAPENTIN 300 MG/1
600 CAPSULE ORAL 3 TIMES DAILY
Status: DISCONTINUED | OUTPATIENT
Start: 2022-01-01 | End: 2022-01-01

## 2022-01-01 RX ORDER — FLASH GLUCOSE SENSOR
1 KIT MISCELLANEOUS
Qty: 2 EACH | Refills: 3 | Status: ON HOLD | OUTPATIENT
Start: 2022-01-01 | End: 2023-01-01

## 2022-01-01 RX ORDER — SEMAGLUTIDE 1.34 MG/ML
INJECTION, SOLUTION SUBCUTANEOUS
Qty: 9 ML | OUTPATIENT
Start: 2022-01-01

## 2022-01-01 RX ORDER — POTASSIUM CHLORIDE 750 MG/1
20 TABLET, EXTENDED RELEASE ORAL DAILY
Status: DISCONTINUED | OUTPATIENT
Start: 2022-01-01 | End: 2023-01-01 | Stop reason: HOSPADM

## 2022-01-01 RX ORDER — ATORVASTATIN CALCIUM 40 MG/1
40 TABLET, FILM COATED ORAL DAILY
Status: DISCONTINUED | OUTPATIENT
Start: 2022-01-01 | End: 2023-01-01 | Stop reason: HOSPADM

## 2022-01-01 RX ORDER — ATORVASTATIN CALCIUM 40 MG/1
40 TABLET, FILM COATED ORAL DAILY
Qty: 90 TABLET | Refills: 3 | Status: ON HOLD | OUTPATIENT
Start: 2022-01-01 | End: 2023-01-01

## 2022-01-01 RX ORDER — NALOXONE HYDROCHLORIDE 0.4 MG/ML
0.4 INJECTION, SOLUTION INTRAMUSCULAR; INTRAVENOUS; SUBCUTANEOUS
Status: DISCONTINUED | OUTPATIENT
Start: 2022-01-01 | End: 2023-01-01 | Stop reason: HOSPADM

## 2022-01-01 RX ORDER — BISACODYL 10 MG
10 SUPPOSITORY, RECTAL RECTAL DAILY PRN
Status: DISCONTINUED | OUTPATIENT
Start: 2022-01-01 | End: 2023-01-01 | Stop reason: HOSPADM

## 2022-01-01 RX ORDER — METOPROLOL SUCCINATE 50 MG/1
50 TABLET, EXTENDED RELEASE ORAL DAILY
Status: DISCONTINUED | OUTPATIENT
Start: 2022-01-01 | End: 2022-01-01

## 2022-01-01 RX ORDER — POTASSIUM CHLORIDE 750 MG/1
20 TABLET, EXTENDED RELEASE ORAL ONCE
Status: COMPLETED | OUTPATIENT
Start: 2022-01-01 | End: 2022-01-01

## 2022-01-01 RX ORDER — GABAPENTIN 300 MG/1
600 CAPSULE ORAL 2 TIMES DAILY
Status: DISCONTINUED | OUTPATIENT
Start: 2022-01-01 | End: 2023-01-01 | Stop reason: HOSPADM

## 2022-01-01 RX ORDER — MAGNESIUM OXIDE 400 MG/1
400 TABLET ORAL EVERY 4 HOURS
Status: COMPLETED | OUTPATIENT
Start: 2022-01-01 | End: 2022-01-01

## 2022-01-01 RX ORDER — POTASSIUM CHLORIDE 750 MG/1
20 TABLET, EXTENDED RELEASE ORAL ONCE
Status: DISCONTINUED | OUTPATIENT
Start: 2022-01-01 | End: 2022-01-01

## 2022-01-01 RX ORDER — CAPSAICIN 0.025 %
CREAM (GRAM) TOPICAL 3 TIMES DAILY
Status: DISCONTINUED | OUTPATIENT
Start: 2022-01-01 | End: 2023-01-01 | Stop reason: HOSPADM

## 2022-01-01 RX ORDER — ALBUTEROL SULFATE 90 UG/1
2 AEROSOL, METERED RESPIRATORY (INHALATION) EVERY 6 HOURS PRN
Qty: 18 G | Refills: 1 | Status: SHIPPED | OUTPATIENT
Start: 2022-01-01

## 2022-01-01 RX ORDER — CAPSAICIN 0.025 %
1 CREAM (GRAM) TOPICAL 3 TIMES DAILY PRN
Status: DISCONTINUED | OUTPATIENT
Start: 2022-01-01 | End: 2023-01-01 | Stop reason: HOSPADM

## 2022-01-01 RX ORDER — GABAPENTIN 300 MG/1
600 CAPSULE ORAL 3 TIMES DAILY
Qty: 270 CAPSULE | Refills: 1 | Status: ON HOLD | OUTPATIENT
Start: 2022-01-01 | End: 2023-01-01

## 2022-01-01 RX ORDER — ISOSORBIDE DINITRATE 20 MG/1
20 TABLET ORAL 3 TIMES DAILY
Status: DISCONTINUED | OUTPATIENT
Start: 2022-01-01 | End: 2023-01-01 | Stop reason: HOSPADM

## 2022-01-01 RX ORDER — INSULIN ASPART 100 [IU]/ML
INJECTION, SOLUTION INTRAVENOUS; SUBCUTANEOUS
Qty: 330 ML | Refills: 0 | Status: ON HOLD | OUTPATIENT
Start: 2022-01-01 | End: 2023-01-01

## 2022-01-01 RX ORDER — CHLOROTHIAZIDE SODIUM 500 MG/1
1000 INJECTION INTRAVENOUS ONCE
Status: COMPLETED | OUTPATIENT
Start: 2022-01-01 | End: 2022-01-01

## 2022-01-01 RX ORDER — LEVOTHYROXINE SODIUM 200 UG/1
200 TABLET ORAL ONCE
Status: DISCONTINUED | OUTPATIENT
Start: 2022-01-01 | End: 2022-01-01

## 2022-01-01 RX ORDER — BUMETANIDE 1 MG/1
4 TABLET ORAL 2 TIMES DAILY
Qty: 300 TABLET | Refills: 1 | Status: ON HOLD | OUTPATIENT
Start: 2022-01-01 | End: 2023-01-01

## 2022-01-01 RX ORDER — BUMETANIDE 2 MG/1
4 TABLET ORAL
Status: DISCONTINUED | OUTPATIENT
Start: 2022-01-01 | End: 2023-01-01

## 2022-01-01 RX ORDER — MAGNESIUM SULFATE HEPTAHYDRATE 40 MG/ML
2 INJECTION, SOLUTION INTRAVENOUS ONCE
Status: COMPLETED | OUTPATIENT
Start: 2022-01-01 | End: 2022-01-01

## 2022-01-01 RX ORDER — INSULIN ASPART 100 [IU]/ML
INJECTION, SOLUTION INTRAVENOUS; SUBCUTANEOUS
Qty: 330 ML | Refills: 0 | Status: SHIPPED | OUTPATIENT
Start: 2022-01-01 | End: 2022-01-01

## 2022-01-01 RX ORDER — FUROSEMIDE 10 MG/ML
80 INJECTION INTRAMUSCULAR; INTRAVENOUS ONCE
Status: COMPLETED | OUTPATIENT
Start: 2022-01-01 | End: 2022-01-01

## 2022-01-01 RX ORDER — POLYETHYLENE GLYCOL 3350 17 G/17G
17 POWDER, FOR SOLUTION ORAL DAILY
Status: DISCONTINUED | OUTPATIENT
Start: 2022-01-01 | End: 2023-01-01 | Stop reason: HOSPADM

## 2022-01-01 RX ORDER — NICOTINE POLACRILEX 4 MG
15-30 LOZENGE BUCCAL
Status: DISCONTINUED | OUTPATIENT
Start: 2022-01-01 | End: 2023-01-01 | Stop reason: HOSPADM

## 2022-01-01 RX ORDER — PREDNISOLONE ACETATE 10 MG/ML
SUSPENSION/ DROPS OPHTHALMIC
COMMUNITY
Start: 2022-01-01 | End: 2022-01-01

## 2022-01-01 RX ORDER — LIDOCAINE 40 MG/G
CREAM TOPICAL
Status: DISCONTINUED | OUTPATIENT
Start: 2022-01-01 | End: 2023-01-01 | Stop reason: HOSPADM

## 2022-01-01 RX ORDER — BUMETANIDE 0.25 MG/ML
5 INJECTION INTRAMUSCULAR; INTRAVENOUS 2 TIMES DAILY
Status: DISCONTINUED | OUTPATIENT
Start: 2022-01-01 | End: 2022-01-01

## 2022-01-01 RX ORDER — LEVOTHYROXINE SODIUM 100 UG/1
200 TABLET ORAL DAILY
Status: DISCONTINUED | OUTPATIENT
Start: 2022-01-01 | End: 2022-01-01

## 2022-01-01 RX ORDER — SPIRONOLACTONE 25 MG/1
50 TABLET ORAL DAILY
Status: DISCONTINUED | OUTPATIENT
Start: 2022-01-01 | End: 2023-01-01 | Stop reason: HOSPADM

## 2022-01-01 RX ORDER — SENNOSIDES 8.6 MG
1 TABLET ORAL 2 TIMES DAILY
Status: DISCONTINUED | OUTPATIENT
Start: 2022-01-01 | End: 2023-01-01 | Stop reason: HOSPADM

## 2022-01-01 RX ORDER — LEVOTHYROXINE SODIUM 100 UG/1
200 TABLET ORAL
Status: DISCONTINUED | OUTPATIENT
Start: 2022-01-01 | End: 2022-01-01

## 2022-01-01 RX ORDER — KETOROLAC TROMETHAMINE 5 MG/ML
SOLUTION OPHTHALMIC
COMMUNITY
Start: 2022-01-01 | End: 2022-01-01

## 2022-01-01 RX ORDER — CYCLOBENZAPRINE HCL 5 MG
10 TABLET ORAL ONCE
Status: COMPLETED | OUTPATIENT
Start: 2022-01-01 | End: 2022-01-01

## 2022-01-01 RX ORDER — INSULIN HUMAN 500 [IU]/ML
INJECTION, SOLUTION SUBCUTANEOUS
Qty: 50 ML | Refills: 0 | Status: ON HOLD | OUTPATIENT
Start: 2022-01-01 | End: 2023-01-01

## 2022-01-01 RX ORDER — ALBUTEROL SULFATE 0.83 MG/ML
2.5 SOLUTION RESPIRATORY (INHALATION) EVERY 6 HOURS PRN
Qty: 90 ML | Refills: 1 | Status: SHIPPED | OUTPATIENT
Start: 2022-01-01

## 2022-01-01 RX ORDER — ALBUTEROL SULFATE 90 UG/1
2 AEROSOL, METERED RESPIRATORY (INHALATION) EVERY 6 HOURS PRN
Status: DISCONTINUED | OUTPATIENT
Start: 2022-01-01 | End: 2023-01-01 | Stop reason: HOSPADM

## 2022-01-01 RX ORDER — OXYCODONE AND ACETAMINOPHEN 5; 325 MG/1; MG/1
1 TABLET ORAL EVERY 8 HOURS PRN
Status: DISCONTINUED | OUTPATIENT
Start: 2022-01-01 | End: 2023-01-01 | Stop reason: HOSPADM

## 2022-01-01 RX ORDER — ALBUTEROL SULFATE 0.83 MG/ML
2.5 SOLUTION RESPIRATORY (INHALATION) EVERY 6 HOURS PRN
Qty: 3 ML | Refills: 11 | Status: SHIPPED | OUTPATIENT
Start: 2022-01-01 | End: 2022-01-01

## 2022-01-01 RX ORDER — ACETAMINOPHEN 325 MG/1
975 TABLET ORAL EVERY 8 HOURS PRN
Status: DISCONTINUED | OUTPATIENT
Start: 2022-01-01 | End: 2023-01-01 | Stop reason: HOSPADM

## 2022-01-01 RX ORDER — PEN NEEDLE, DIABETIC 32GX 5/32"
NEEDLE, DISPOSABLE MISCELLANEOUS
Qty: 600 EACH | Refills: 3 | Status: SHIPPED | OUTPATIENT
Start: 2022-01-01

## 2022-01-01 RX ORDER — SIMETHICONE 80 MG
80 TABLET,CHEWABLE ORAL EVERY 6 HOURS PRN
Status: DISCONTINUED | OUTPATIENT
Start: 2022-01-01 | End: 2023-01-01 | Stop reason: HOSPADM

## 2022-01-01 RX ORDER — SEMAGLUTIDE 1.34 MG/ML
INJECTION, SOLUTION SUBCUTANEOUS
Qty: 9 ML | Refills: 3 | OUTPATIENT
Start: 2022-01-01

## 2022-01-01 RX ORDER — ALBUTEROL SULFATE 90 UG/1
2 AEROSOL, METERED RESPIRATORY (INHALATION) EVERY 6 HOURS PRN
Qty: 18 G | Refills: 1 | Status: SHIPPED | OUTPATIENT
Start: 2022-01-01 | End: 2022-01-01

## 2022-01-01 RX ORDER — NICOTINE 21 MG/24HR
1 PATCH, TRANSDERMAL 24 HOURS TRANSDERMAL DAILY
Status: DISCONTINUED | OUTPATIENT
Start: 2022-01-01 | End: 2023-01-01 | Stop reason: HOSPADM

## 2022-01-01 RX ORDER — INSULIN GLARGINE 300 U/ML
INJECTION, SOLUTION SUBCUTANEOUS
Qty: 225 ML | Refills: 0 | Status: ON HOLD | OUTPATIENT
Start: 2022-01-01 | End: 2023-01-01

## 2022-01-01 RX ORDER — BUMETANIDE 0.25 MG/ML
5 INJECTION INTRAMUSCULAR; INTRAVENOUS ONCE
Status: DISCONTINUED | OUTPATIENT
Start: 2022-01-01 | End: 2022-01-01

## 2022-01-01 RX ORDER — ACETAMINOPHEN 325 MG/1
650 TABLET ORAL 3 TIMES DAILY PRN
Status: DISCONTINUED | OUTPATIENT
Start: 2022-01-01 | End: 2022-01-01

## 2022-01-01 RX ORDER — DEXTROSE MONOHYDRATE 25 G/50ML
25-50 INJECTION, SOLUTION INTRAVENOUS
Status: DISCONTINUED | OUTPATIENT
Start: 2022-01-01 | End: 2023-01-01 | Stop reason: HOSPADM

## 2022-01-01 RX ORDER — ALBUTEROL SULFATE 0.83 MG/ML
2.5 SOLUTION RESPIRATORY (INHALATION) EVERY 6 HOURS PRN
Status: DISCONTINUED | OUTPATIENT
Start: 2022-01-01 | End: 2023-01-01 | Stop reason: HOSPADM

## 2022-01-01 RX ORDER — HYDRALAZINE HYDROCHLORIDE 25 MG/1
12.5 TABLET, FILM COATED ORAL 3 TIMES DAILY
Qty: 60 TABLET | Refills: 4 | Status: ON HOLD | OUTPATIENT
Start: 2022-01-01 | End: 2023-01-01

## 2022-01-01 RX ORDER — BUMETANIDE 1 MG/1
TABLET ORAL
Qty: 300 TABLET | Refills: 1 | OUTPATIENT
Start: 2022-01-01

## 2022-01-01 RX ORDER — HYDROCHLOROTHIAZIDE 12.5 MG/1
12.5 TABLET ORAL
Qty: 180 TABLET | Refills: 3 | Status: ON HOLD | OUTPATIENT
Start: 2022-01-01 | End: 2023-01-01

## 2022-01-01 RX ORDER — METOLAZONE 2.5 MG/1
10 TABLET ORAL ONCE
Status: COMPLETED | OUTPATIENT
Start: 2022-01-01 | End: 2022-01-01

## 2022-01-01 RX ORDER — LEVOTHYROXINE SODIUM 100 UG/1
200 TABLET ORAL
Status: DISCONTINUED | OUTPATIENT
Start: 2022-01-01 | End: 2023-01-01 | Stop reason: HOSPADM

## 2022-01-01 RX ADMIN — LEVOTHYROXINE SODIUM 200 MCG: 100 TABLET ORAL at 09:23

## 2022-01-01 RX ADMIN — Medication 12.5 MG: at 14:37

## 2022-01-01 RX ADMIN — METOPROLOL SUCCINATE 50 MG: 50 TABLET, EXTENDED RELEASE ORAL at 09:35

## 2022-01-01 RX ADMIN — Medication 12.5 MG: at 20:04

## 2022-01-01 RX ADMIN — CAPSAICIN: 0.25 CREAM TOPICAL at 20:22

## 2022-01-01 RX ADMIN — Medication 12.5 MG: at 19:21

## 2022-01-01 RX ADMIN — ACETAMINOPHEN 975 MG: 325 TABLET, FILM COATED ORAL at 20:21

## 2022-01-01 RX ADMIN — Medication 7.5 MG: at 14:12

## 2022-01-01 RX ADMIN — BUMETANIDE 4 MG/HR: 0.25 INJECTION, SOLUTION INTRAMUSCULAR; INTRAVENOUS at 11:24

## 2022-01-01 RX ADMIN — METOPROLOL SUCCINATE 50 MG: 50 TABLET, EXTENDED RELEASE ORAL at 09:23

## 2022-01-01 RX ADMIN — EMPAGLIFLOZIN 25 MG: 25 TABLET, FILM COATED ORAL at 09:35

## 2022-01-01 RX ADMIN — NICOTINE 1 PATCH: 14 PATCH, EXTENDED RELEASE TRANSDERMAL at 08:32

## 2022-01-01 RX ADMIN — ACETAMINOPHEN 650 MG: 325 TABLET, FILM COATED ORAL at 11:23

## 2022-01-01 RX ADMIN — SIMETHICONE 80 MG: 80 TABLET, CHEWABLE ORAL at 19:15

## 2022-01-01 RX ADMIN — APIXABAN 5 MG: 5 TABLET, FILM COATED ORAL at 19:22

## 2022-01-01 RX ADMIN — DICLOFENAC SODIUM 2 G: 10 GEL TOPICAL at 20:15

## 2022-01-01 RX ADMIN — DICLOFENAC SODIUM 2 G: 10 GEL TOPICAL at 20:21

## 2022-01-01 RX ADMIN — APIXABAN 5 MG: 5 TABLET, FILM COATED ORAL at 21:09

## 2022-01-01 RX ADMIN — APIXABAN 5 MG: 5 TABLET, FILM COATED ORAL at 19:37

## 2022-01-01 RX ADMIN — ATORVASTATIN CALCIUM 40 MG: 40 TABLET, FILM COATED ORAL at 08:18

## 2022-01-01 RX ADMIN — UMECLIDINIUM 1 PUFF: 62.5 AEROSOL, POWDER ORAL at 08:28

## 2022-01-01 RX ADMIN — ISOSORBIDE DINITRATE 20 MG: 20 TABLET ORAL at 14:09

## 2022-01-01 RX ADMIN — METOPROLOL SUCCINATE 50 MG: 50 TABLET, EXTENDED RELEASE ORAL at 08:18

## 2022-01-01 RX ADMIN — ISOSORBIDE DINITRATE 20 MG: 20 TABLET ORAL at 08:18

## 2022-01-01 RX ADMIN — SPIRONOLACTONE 50 MG: 25 TABLET, FILM COATED ORAL at 09:35

## 2022-01-01 RX ADMIN — Medication 7.5 MG: at 13:32

## 2022-01-01 RX ADMIN — APIXABAN 5 MG: 5 TABLET, FILM COATED ORAL at 08:29

## 2022-01-01 RX ADMIN — METOLAZONE 10 MG: 2.5 TABLET ORAL at 09:30

## 2022-01-01 RX ADMIN — NICOTINE 1 PATCH: 14 PATCH, EXTENDED RELEASE TRANSDERMAL at 08:37

## 2022-01-01 RX ADMIN — Medication 12.5 MG: at 13:31

## 2022-01-01 RX ADMIN — INSULIN GLARGINE 30 UNITS: 100 INJECTION, SOLUTION SUBCUTANEOUS at 22:10

## 2022-01-01 RX ADMIN — ATORVASTATIN CALCIUM 40 MG: 40 TABLET, FILM COATED ORAL at 08:28

## 2022-01-01 RX ADMIN — DICLOFENAC SODIUM 2 G: 10 GEL TOPICAL at 17:51

## 2022-01-01 RX ADMIN — OXYCODONE HYDROCHLORIDE AND ACETAMINOPHEN 1 TABLET: 5; 325 TABLET ORAL at 08:23

## 2022-01-01 RX ADMIN — Medication 7.5 MG: at 00:02

## 2022-01-01 RX ADMIN — Medication 12.5 MG: at 14:22

## 2022-01-01 RX ADMIN — Medication 1 LOZENGE: at 04:13

## 2022-01-01 RX ADMIN — CAPSAICIN: 0.25 CREAM TOPICAL at 14:38

## 2022-01-01 RX ADMIN — MICONAZOLE NITRATE: 20 POWDER TOPICAL at 09:25

## 2022-01-01 RX ADMIN — OXYCODONE HYDROCHLORIDE AND ACETAMINOPHEN 1 TABLET: 5; 325 TABLET ORAL at 19:21

## 2022-01-01 RX ADMIN — NICOTINE 1 PATCH: 14 PATCH, EXTENDED RELEASE TRANSDERMAL at 09:21

## 2022-01-01 RX ADMIN — BUMETANIDE 5 MG: 0.25 INJECTION INTRAMUSCULAR; INTRAVENOUS at 09:22

## 2022-01-01 RX ADMIN — METOPROLOL SUCCINATE 50 MG: 50 TABLET, EXTENDED RELEASE ORAL at 15:27

## 2022-01-01 RX ADMIN — UMECLIDINIUM 1 PUFF: 62.5 AEROSOL, POWDER ORAL at 08:37

## 2022-01-01 RX ADMIN — ISOSORBIDE DINITRATE 20 MG: 20 TABLET ORAL at 14:30

## 2022-01-01 RX ADMIN — DEXTROSE AND SODIUM CHLORIDE: 10; .45 INJECTION, SOLUTION INTRAVENOUS at 00:21

## 2022-01-01 RX ADMIN — SODIUM PHOSPHATE, MONOBASIC, MONOHYDRATE AND SODIUM PHOSPHATE, DIBASIC, ANHYDROUS 9 MMOL: 276; 142 INJECTION, SOLUTION INTRAVENOUS at 14:18

## 2022-01-01 RX ADMIN — Medication 1 LOZENGE: at 00:38

## 2022-01-01 RX ADMIN — Medication 12.5 MG: at 14:17

## 2022-01-01 RX ADMIN — GABAPENTIN 600 MG: 300 CAPSULE ORAL at 14:17

## 2022-01-01 RX ADMIN — DICLOFENAC SODIUM 2 G: 10 GEL TOPICAL at 11:25

## 2022-01-01 RX ADMIN — MAGNESIUM OXIDE TAB 400 MG (241.3 MG ELEMENTAL MG) 400 MG: 400 (241.3 MG) TAB at 18:11

## 2022-01-01 RX ADMIN — Medication 12.5 MG: at 08:28

## 2022-01-01 RX ADMIN — DEXTROSE AND SODIUM CHLORIDE: 10; .45 INJECTION, SOLUTION INTRAVENOUS at 10:17

## 2022-01-01 RX ADMIN — GABAPENTIN 600 MG: 300 CAPSULE ORAL at 21:09

## 2022-01-01 RX ADMIN — BUMETANIDE 2 MG/HR: 0.25 INJECTION, SOLUTION INTRAMUSCULAR; INTRAVENOUS at 10:29

## 2022-01-01 RX ADMIN — APIXABAN 5 MG: 5 TABLET, FILM COATED ORAL at 08:14

## 2022-01-01 RX ADMIN — CAPSAICIN: 0.25 CREAM TOPICAL at 09:36

## 2022-01-01 RX ADMIN — POTASSIUM PHOSPHATE, MONOBASIC AND POTASSIUM PHOSPHATE, DIBASIC 15 MMOL: 224; 236 INJECTION, SOLUTION, CONCENTRATE INTRAVENOUS at 15:49

## 2022-01-01 RX ADMIN — BUMETANIDE 3 MG/HR: 0.25 INJECTION, SOLUTION INTRAMUSCULAR; INTRAVENOUS at 16:29

## 2022-01-01 RX ADMIN — ISOSORBIDE DINITRATE 20 MG: 20 TABLET ORAL at 19:50

## 2022-01-01 RX ADMIN — Medication 1 LOZENGE: at 09:36

## 2022-01-01 RX ADMIN — OXYCODONE HYDROCHLORIDE AND ACETAMINOPHEN 1 TABLET: 5; 325 TABLET ORAL at 19:37

## 2022-01-01 RX ADMIN — CAPSAICIN: 0.25 CREAM TOPICAL at 21:09

## 2022-01-01 RX ADMIN — SIMETHICONE 80 MG: 80 TABLET, CHEWABLE ORAL at 09:20

## 2022-01-01 RX ADMIN — ATORVASTATIN CALCIUM 40 MG: 40 TABLET, FILM COATED ORAL at 18:10

## 2022-01-01 RX ADMIN — APIXABAN 5 MG: 5 TABLET, FILM COATED ORAL at 19:50

## 2022-01-01 RX ADMIN — MICONAZOLE NITRATE: 20 POWDER TOPICAL at 20:05

## 2022-01-01 RX ADMIN — CHLOROTHIAZIDE SODIUM 1000 MG: 500 INJECTION, POWDER, LYOPHILIZED, FOR SOLUTION INTRAVENOUS at 21:02

## 2022-01-01 RX ADMIN — ISOSORBIDE DINITRATE 20 MG: 20 TABLET ORAL at 14:17

## 2022-01-01 RX ADMIN — NICOTINE 1 PATCH: 14 PATCH, EXTENDED RELEASE TRANSDERMAL at 08:28

## 2022-01-01 RX ADMIN — OXYCODONE HYDROCHLORIDE AND ACETAMINOPHEN 1 TABLET: 5; 325 TABLET ORAL at 08:24

## 2022-01-01 RX ADMIN — MAGNESIUM SULFATE IN WATER 2 G: 40 INJECTION, SOLUTION INTRAVENOUS at 14:18

## 2022-01-01 RX ADMIN — LEVOTHYROXINE SODIUM 200 MCG: 100 TABLET ORAL at 08:39

## 2022-01-01 RX ADMIN — Medication 12.5 MG: at 20:21

## 2022-01-01 RX ADMIN — SIMETHICONE 80 MG: 80 TABLET, CHEWABLE ORAL at 22:20

## 2022-01-01 RX ADMIN — ACETAMINOPHEN 650 MG: 325 TABLET, FILM COATED ORAL at 21:08

## 2022-01-01 RX ADMIN — BUMETANIDE 4 MG/HR: 0.25 INJECTION, SOLUTION INTRAMUSCULAR; INTRAVENOUS at 03:42

## 2022-01-01 RX ADMIN — LEVOTHYROXINE SODIUM 200 MCG: 100 TABLET ORAL at 08:18

## 2022-01-01 RX ADMIN — Medication 12.5 MG: at 08:39

## 2022-01-01 RX ADMIN — MAGNESIUM OXIDE TAB 400 MG (241.3 MG ELEMENTAL MG) 400 MG: 400 (241.3 MG) TAB at 08:25

## 2022-01-01 RX ADMIN — GABAPENTIN 600 MG: 300 CAPSULE ORAL at 20:21

## 2022-01-01 RX ADMIN — METOPROLOL SUCCINATE 50 MG: 50 TABLET, EXTENDED RELEASE ORAL at 08:13

## 2022-01-01 RX ADMIN — GABAPENTIN 600 MG: 300 CAPSULE ORAL at 20:08

## 2022-01-01 RX ADMIN — ISOSORBIDE DINITRATE 20 MG: 20 TABLET ORAL at 08:14

## 2022-01-01 RX ADMIN — BUMETANIDE 3 MG/HR: 0.25 INJECTION, SOLUTION INTRAMUSCULAR; INTRAVENOUS at 03:58

## 2022-01-01 RX ADMIN — ISOSORBIDE DINITRATE 20 MG: 20 TABLET ORAL at 20:08

## 2022-01-01 RX ADMIN — OXYCODONE HYDROCHLORIDE AND ACETAMINOPHEN 1 TABLET: 5; 325 TABLET ORAL at 20:05

## 2022-01-01 RX ADMIN — SENNOSIDES 1 TABLET: 8.6 TABLET, FILM COATED ORAL at 08:18

## 2022-01-01 RX ADMIN — APIXABAN 5 MG: 5 TABLET, FILM COATED ORAL at 08:39

## 2022-01-01 RX ADMIN — SPIRONOLACTONE 50 MG: 25 TABLET, FILM COATED ORAL at 09:23

## 2022-01-01 RX ADMIN — SENNOSIDES 1 TABLET: 8.6 TABLET, FILM COATED ORAL at 08:28

## 2022-01-01 RX ADMIN — MAGNESIUM SULFATE IN WATER 4 G: 40 INJECTION, SOLUTION INTRAVENOUS at 06:54

## 2022-01-01 RX ADMIN — Medication 12.5 MG: at 19:37

## 2022-01-01 RX ADMIN — OXYCODONE HYDROCHLORIDE AND ACETAMINOPHEN 1 TABLET: 5; 325 TABLET ORAL at 14:28

## 2022-01-01 RX ADMIN — OXYCODONE HYDROCHLORIDE AND ACETAMINOPHEN 1 TABLET: 5; 325 TABLET ORAL at 20:23

## 2022-01-01 RX ADMIN — UMECLIDINIUM 1 PUFF: 62.5 AEROSOL, POWDER ORAL at 08:17

## 2022-01-01 RX ADMIN — BUMETANIDE 5 MG: 0.25 INJECTION INTRAMUSCULAR; INTRAVENOUS at 20:08

## 2022-01-01 RX ADMIN — MICONAZOLE NITRATE: 20 POWDER TOPICAL at 08:09

## 2022-01-01 RX ADMIN — ISOSORBIDE DINITRATE 20 MG: 20 TABLET ORAL at 08:28

## 2022-01-01 RX ADMIN — ATORVASTATIN CALCIUM 40 MG: 40 TABLET, FILM COATED ORAL at 08:16

## 2022-01-01 RX ADMIN — GABAPENTIN 600 MG: 300 CAPSULE ORAL at 09:24

## 2022-01-01 RX ADMIN — APIXABAN 5 MG: 5 TABLET, FILM COATED ORAL at 20:05

## 2022-01-01 RX ADMIN — SIMETHICONE 80 MG: 80 TABLET, CHEWABLE ORAL at 09:34

## 2022-01-01 RX ADMIN — OXYCODONE HYDROCHLORIDE AND ACETAMINOPHEN 1 TABLET: 5; 325 TABLET ORAL at 12:26

## 2022-01-01 RX ADMIN — GABAPENTIN 600 MG: 300 CAPSULE ORAL at 08:18

## 2022-01-01 RX ADMIN — SPIRONOLACTONE 50 MG: 25 TABLET, FILM COATED ORAL at 08:28

## 2022-01-01 RX ADMIN — Medication 1 LOZENGE: at 19:15

## 2022-01-01 RX ADMIN — APIXABAN 5 MG: 5 TABLET, FILM COATED ORAL at 20:21

## 2022-01-01 RX ADMIN — APIXABAN 5 MG: 5 TABLET, FILM COATED ORAL at 20:08

## 2022-01-01 RX ADMIN — Medication 12.5 MG: at 14:38

## 2022-01-01 RX ADMIN — Medication 12.5 MG: at 19:50

## 2022-01-01 RX ADMIN — MAGNESIUM SULFATE IN WATER 2 G: 40 INJECTION, SOLUTION INTRAVENOUS at 12:19

## 2022-01-01 RX ADMIN — MAGNESIUM SULFATE IN WATER 2 G: 40 INJECTION, SOLUTION INTRAVENOUS at 22:04

## 2022-01-01 RX ADMIN — CAPSAICIN: 0.25 CREAM TOPICAL at 08:17

## 2022-01-01 RX ADMIN — MAGNESIUM OXIDE TAB 400 MG (241.3 MG ELEMENTAL MG) 400 MG: 400 (241.3 MG) TAB at 11:17

## 2022-01-01 RX ADMIN — Medication 12.5 MG: at 14:08

## 2022-01-01 RX ADMIN — METOPROLOL SUCCINATE 50 MG: 50 TABLET, EXTENDED RELEASE ORAL at 08:39

## 2022-01-01 RX ADMIN — OXYCODONE HYDROCHLORIDE AND ACETAMINOPHEN 1 TABLET: 5; 325 TABLET ORAL at 04:22

## 2022-01-01 RX ADMIN — Medication 1 LOZENGE: at 03:37

## 2022-01-01 RX ADMIN — OXYCODONE HYDROCHLORIDE AND ACETAMINOPHEN 1 TABLET: 5; 325 TABLET ORAL at 20:08

## 2022-01-01 RX ADMIN — ACETAMINOPHEN 650 MG: 325 TABLET, FILM COATED ORAL at 10:03

## 2022-01-01 RX ADMIN — APIXABAN 5 MG: 5 TABLET, FILM COATED ORAL at 09:24

## 2022-01-01 RX ADMIN — GABAPENTIN 600 MG: 300 CAPSULE ORAL at 08:28

## 2022-01-01 RX ADMIN — APIXABAN 5 MG: 5 TABLET, FILM COATED ORAL at 08:18

## 2022-01-01 RX ADMIN — GABAPENTIN 600 MG: 300 CAPSULE ORAL at 09:35

## 2022-01-01 RX ADMIN — METOPROLOL SUCCINATE 50 MG: 50 TABLET, EXTENDED RELEASE ORAL at 08:29

## 2022-01-01 RX ADMIN — ISOSORBIDE DINITRATE 20 MG: 20 TABLET ORAL at 09:24

## 2022-01-01 RX ADMIN — CAPSAICIN: 0.25 CREAM TOPICAL at 19:40

## 2022-01-01 RX ADMIN — GABAPENTIN 600 MG: 300 CAPSULE ORAL at 19:21

## 2022-01-01 RX ADMIN — ISOSORBIDE DINITRATE 20 MG: 20 TABLET ORAL at 14:22

## 2022-01-01 RX ADMIN — Medication 1 LOZENGE: at 16:50

## 2022-01-01 RX ADMIN — Medication 12.5 MG: at 09:24

## 2022-01-01 RX ADMIN — ISOSORBIDE DINITRATE 20 MG: 20 TABLET ORAL at 19:37

## 2022-01-01 RX ADMIN — LEVOTHYROXINE SODIUM 200 MCG: 100 TABLET ORAL at 09:47

## 2022-01-01 RX ADMIN — DEXTROSE MONOHYDRATE 12.5 ML: 25 INJECTION, SOLUTION INTRAVENOUS at 06:01

## 2022-01-01 RX ADMIN — UMECLIDINIUM 1 PUFF: 62.5 AEROSOL, POWDER ORAL at 09:36

## 2022-01-01 RX ADMIN — OXYCODONE HYDROCHLORIDE AND ACETAMINOPHEN 1 TABLET: 5; 325 TABLET ORAL at 03:37

## 2022-01-01 RX ADMIN — LEVOTHYROXINE SODIUM 200 MCG: 100 TABLET ORAL at 08:37

## 2022-01-01 RX ADMIN — ISOSORBIDE DINITRATE 20 MG: 20 TABLET ORAL at 14:38

## 2022-01-01 RX ADMIN — POTASSIUM CHLORIDE 20 MEQ: 750 TABLET, EXTENDED RELEASE ORAL at 11:23

## 2022-01-01 RX ADMIN — ISOSORBIDE DINITRATE 20 MG: 20 TABLET ORAL at 20:04

## 2022-01-01 RX ADMIN — OXYCODONE HYDROCHLORIDE AND ACETAMINOPHEN 1 TABLET: 5; 325 TABLET ORAL at 23:20

## 2022-01-01 RX ADMIN — UMECLIDINIUM 1 PUFF: 62.5 AEROSOL, POWDER ORAL at 09:19

## 2022-01-01 RX ADMIN — INSULIN GLARGINE 50 UNITS: 100 INJECTION, SOLUTION SUBCUTANEOUS at 20:15

## 2022-01-01 RX ADMIN — MICONAZOLE NITRATE: 20 POWDER TOPICAL at 20:22

## 2022-01-01 RX ADMIN — GABAPENTIN 600 MG: 300 CAPSULE ORAL at 14:30

## 2022-01-01 RX ADMIN — DICLOFENAC SODIUM 2 G: 10 GEL TOPICAL at 09:19

## 2022-01-01 RX ADMIN — Medication 1 LOZENGE: at 17:18

## 2022-01-01 RX ADMIN — ISOSORBIDE DINITRATE 20 MG: 20 TABLET ORAL at 20:20

## 2022-01-01 RX ADMIN — GABAPENTIN 600 MG: 300 CAPSULE ORAL at 19:50

## 2022-01-01 RX ADMIN — POTASSIUM CHLORIDE 20 MEQ: 750 TABLET, EXTENDED RELEASE ORAL at 09:23

## 2022-01-01 RX ADMIN — CAPSAICIN: 0.25 CREAM TOPICAL at 08:37

## 2022-01-01 RX ADMIN — POTASSIUM CHLORIDE 20 MEQ: 750 TABLET, EXTENDED RELEASE ORAL at 08:27

## 2022-01-01 RX ADMIN — BUMETANIDE 5 MG: 0.25 INJECTION INTRAMUSCULAR; INTRAVENOUS at 10:31

## 2022-01-01 RX ADMIN — OXYCODONE HYDROCHLORIDE AND ACETAMINOPHEN 1 TABLET: 5; 325 TABLET ORAL at 12:07

## 2022-01-01 RX ADMIN — Medication 1 LOZENGE: at 09:41

## 2022-01-01 RX ADMIN — DICLOFENAC SODIUM 2 G: 10 GEL TOPICAL at 08:10

## 2022-01-01 RX ADMIN — ISOSORBIDE DINITRATE 20 MG: 20 TABLET ORAL at 19:21

## 2022-01-01 RX ADMIN — SPIRONOLACTONE 50 MG: 25 TABLET, FILM COATED ORAL at 11:16

## 2022-01-01 RX ADMIN — UMECLIDINIUM 1 PUFF: 62.5 AEROSOL, POWDER ORAL at 22:24

## 2022-01-01 RX ADMIN — GABAPENTIN 600 MG: 300 CAPSULE ORAL at 14:08

## 2022-01-01 RX ADMIN — UMECLIDINIUM 1 PUFF: 62.5 AEROSOL, POWDER ORAL at 08:12

## 2022-01-01 RX ADMIN — SIMETHICONE 80 MG: 80 TABLET, CHEWABLE ORAL at 11:24

## 2022-01-01 RX ADMIN — BUMETANIDE 4 MG/HR: 0.25 INJECTION, SOLUTION INTRAMUSCULAR; INTRAVENOUS at 22:15

## 2022-01-01 RX ADMIN — GABAPENTIN 600 MG: 300 CAPSULE ORAL at 20:04

## 2022-01-01 RX ADMIN — LIDOCAINE HYDROCHLORIDE 1 ML: 10 INJECTION, SOLUTION EPIDURAL; INFILTRATION; INTRACAUDAL; PERINEURAL at 20:51

## 2022-01-01 RX ADMIN — OXYCODONE HYDROCHLORIDE AND ACETAMINOPHEN 1 TABLET: 5; 325 TABLET ORAL at 14:30

## 2022-01-01 RX ADMIN — MAGNESIUM OXIDE TAB 400 MG (241.3 MG ELEMENTAL MG) 400 MG: 400 (241.3 MG) TAB at 22:23

## 2022-01-01 RX ADMIN — SIMETHICONE 80 MG: 80 TABLET, CHEWABLE ORAL at 00:38

## 2022-01-01 RX ADMIN — SIMETHICONE 80 MG: 80 TABLET, CHEWABLE ORAL at 00:02

## 2022-01-01 RX ADMIN — INSULIN ASPART 3 UNITS: 100 INJECTION, SOLUTION INTRAVENOUS; SUBCUTANEOUS at 11:33

## 2022-01-01 RX ADMIN — GABAPENTIN 600 MG: 300 CAPSULE ORAL at 08:16

## 2022-01-01 RX ADMIN — OXYCODONE HYDROCHLORIDE AND ACETAMINOPHEN 1 TABLET: 5; 325 TABLET ORAL at 04:37

## 2022-01-01 RX ADMIN — SENNOSIDES 1 TABLET: 8.6 TABLET, FILM COATED ORAL at 21:09

## 2022-01-01 RX ADMIN — Medication 1 PACKET: at 11:24

## 2022-01-01 RX ADMIN — LEVOTHYROXINE SODIUM 400 MCG: 0.15 TABLET ORAL at 08:23

## 2022-01-01 RX ADMIN — SIMETHICONE 80 MG: 80 TABLET, CHEWABLE ORAL at 03:37

## 2022-01-01 RX ADMIN — ACETAMINOPHEN 975 MG: 325 TABLET, FILM COATED ORAL at 09:45

## 2022-01-01 RX ADMIN — BUMETANIDE 5 MG: 0.25 INJECTION INTRAMUSCULAR; INTRAVENOUS at 10:03

## 2022-01-01 RX ADMIN — Medication 7.5 MG: at 22:10

## 2022-01-01 RX ADMIN — EMPAGLIFLOZIN 25 MG: 25 TABLET, FILM COATED ORAL at 08:28

## 2022-01-01 RX ADMIN — MAGNESIUM OXIDE TAB 400 MG (241.3 MG ELEMENTAL MG) 400 MG: 400 (241.3 MG) TAB at 11:25

## 2022-01-01 RX ADMIN — ACETAMINOPHEN 975 MG: 325 TABLET, FILM COATED ORAL at 09:50

## 2022-01-01 RX ADMIN — SIMETHICONE 80 MG: 80 TABLET, CHEWABLE ORAL at 04:14

## 2022-01-01 RX ADMIN — Medication 1 PACKET: at 08:15

## 2022-01-01 RX ADMIN — NICOTINE 1 PATCH: 14 PATCH, EXTENDED RELEASE TRANSDERMAL at 09:34

## 2022-01-01 RX ADMIN — CAPSAICIN: 0.25 CREAM TOPICAL at 14:32

## 2022-01-01 RX ADMIN — MICONAZOLE NITRATE: 20 POWDER TOPICAL at 20:12

## 2022-01-01 RX ADMIN — NICOTINE 1 PATCH: 14 PATCH, EXTENDED RELEASE TRANSDERMAL at 08:16

## 2022-01-01 RX ADMIN — Medication 7.5 MG: at 09:24

## 2022-01-01 RX ADMIN — ACETAMINOPHEN 975 MG: 325 TABLET, FILM COATED ORAL at 00:02

## 2022-01-01 RX ADMIN — BUMETANIDE 4 MG/HR: 0.25 INJECTION, SOLUTION INTRAMUSCULAR; INTRAVENOUS at 16:20

## 2022-01-01 RX ADMIN — Medication 1 LOZENGE: at 00:03

## 2022-01-01 RX ADMIN — DEXTROSE MONOHYDRATE 25 ML: 25 INJECTION, SOLUTION INTRAVENOUS at 04:49

## 2022-01-01 RX ADMIN — GABAPENTIN 600 MG: 300 CAPSULE ORAL at 14:38

## 2022-01-01 RX ADMIN — CYCLOBENZAPRINE HYDROCHLORIDE 10 MG: 5 TABLET, FILM COATED ORAL at 02:42

## 2022-01-01 RX ADMIN — Medication 1 LOZENGE: at 04:16

## 2022-01-01 RX ADMIN — ACETAMINOPHEN 975 MG: 325 TABLET, FILM COATED ORAL at 00:38

## 2022-01-01 RX ADMIN — FUROSEMIDE 80 MG: 10 INJECTION, SOLUTION INTRAVENOUS at 14:23

## 2022-01-01 RX ADMIN — BUMETANIDE 4 MG: 2 TABLET ORAL at 15:50

## 2022-01-01 RX ADMIN — POLYETHYLENE GLYCOL 3350 17 G: 17 POWDER, FOR SOLUTION ORAL at 08:17

## 2022-01-01 RX ADMIN — DICLOFENAC SODIUM 2 G: 10 GEL TOPICAL at 08:38

## 2022-01-01 RX ADMIN — EMPAGLIFLOZIN 25 MG: 25 TABLET, FILM COATED ORAL at 09:24

## 2022-01-01 RX ADMIN — Medication 12.5 MG: at 08:18

## 2022-01-01 RX ADMIN — APIXABAN 5 MG: 5 TABLET, FILM COATED ORAL at 09:34

## 2022-01-01 RX ADMIN — BUMETANIDE 5 MG: 0.25 INJECTION INTRAMUSCULAR; INTRAVENOUS at 21:52

## 2022-01-01 RX ADMIN — GABAPENTIN 600 MG: 300 CAPSULE ORAL at 19:37

## 2022-01-01 RX ADMIN — ATORVASTATIN CALCIUM 40 MG: 40 TABLET, FILM COATED ORAL at 09:27

## 2022-01-01 RX ADMIN — Medication 12.5 MG: at 08:14

## 2022-01-01 RX ADMIN — OXYCODONE HYDROCHLORIDE AND ACETAMINOPHEN 1 TABLET: 5; 325 TABLET ORAL at 04:13

## 2022-01-01 RX ADMIN — Medication 7 ML: at 14:40

## 2022-01-01 RX ADMIN — POTASSIUM CHLORIDE 20 MEQ: 750 TABLET, EXTENDED RELEASE ORAL at 09:34

## 2022-01-01 RX ADMIN — BUMETANIDE 5 MG: 0.25 INJECTION INTRAMUSCULAR; INTRAVENOUS at 13:16

## 2022-01-01 RX ADMIN — INSULIN ASPART 7 UNITS: 100 INJECTION, SOLUTION INTRAVENOUS; SUBCUTANEOUS at 08:37

## 2022-01-01 RX ADMIN — SENNOSIDES 1 TABLET: 8.6 TABLET, FILM COATED ORAL at 19:36

## 2022-01-01 RX ADMIN — ATORVASTATIN CALCIUM 40 MG: 40 TABLET, FILM COATED ORAL at 08:39

## 2022-01-01 RX ADMIN — DEXTROSE MONOHYDRATE 25 ML: 25 INJECTION, SOLUTION INTRAVENOUS at 04:13

## 2022-01-01 RX ADMIN — ISOSORBIDE DINITRATE 20 MG: 20 TABLET ORAL at 13:31

## 2022-01-01 RX ADMIN — Medication 1 LOZENGE: at 08:23

## 2022-01-01 RX ADMIN — CAPSAICIN: 0.25 CREAM TOPICAL at 15:27

## 2022-01-01 RX ADMIN — CAPSAICIN: 0.25 CREAM TOPICAL at 19:22

## 2022-01-01 RX ADMIN — ISOSORBIDE DINITRATE 20 MG: 20 TABLET ORAL at 21:09

## 2022-01-01 RX ADMIN — ATORVASTATIN CALCIUM 40 MG: 40 TABLET, FILM COATED ORAL at 09:35

## 2022-01-01 RX ADMIN — Medication 12.5 MG: at 21:09

## 2022-01-01 RX ADMIN — MICONAZOLE NITRATE: 20 POWDER TOPICAL at 08:39

## 2022-01-01 RX ADMIN — Medication 12.5 MG: at 20:08

## 2022-01-01 RX ADMIN — MAGNESIUM OXIDE TAB 400 MG (241.3 MG ELEMENTAL MG) 400 MG: 400 (241.3 MG) TAB at 08:17

## 2022-01-01 RX ADMIN — CHLOROTHIAZIDE SODIUM 1000 MG: 500 INJECTION, POWDER, LYOPHILIZED, FOR SOLUTION INTRAVENOUS at 09:43

## 2022-01-01 RX ADMIN — DEXTROSE MONOHYDRATE 25 ML: 25 INJECTION, SOLUTION INTRAVENOUS at 05:18

## 2022-01-01 RX ADMIN — BUMETANIDE 4 MG: 2 TABLET ORAL at 11:25

## 2022-01-01 RX ADMIN — Medication 1 LOZENGE: at 09:06

## 2022-01-01 RX ADMIN — DICLOFENAC SODIUM 2 G: 10 GEL TOPICAL at 12:12

## 2022-01-01 RX ADMIN — SODIUM PHOSPHATE, MONOBASIC, MONOHYDRATE AND SODIUM PHOSPHATE, DIBASIC, ANHYDROUS 15 MMOL: 276; 142 INJECTION, SOLUTION INTRAVENOUS at 16:36

## 2022-01-01 RX ADMIN — SIMETHICONE 80 MG: 80 TABLET, CHEWABLE ORAL at 08:23

## 2022-01-01 ASSESSMENT — ENCOUNTER SYMPTOMS
ABDOMINAL PAIN: 0
NAUSEA: 1
FREQUENCY: 0
FLANK PAIN: 0
BACK PAIN: 0
FEVER: 0
VOMITING: 1
PALPITATIONS: 1
DIZZINESS: 0
CHILLS: 0
COUGH: 0
HEADACHES: 0
SORE THROAT: 0

## 2022-01-01 ASSESSMENT — ACTIVITIES OF DAILY LIVING (ADL)
ADLS_ACUITY_SCORE: 54
ADLS_ACUITY_SCORE: 52
ADLS_ACUITY_SCORE: 53
ADLS_ACUITY_SCORE: 53
ADLS_ACUITY_SCORE: 52
ADLS_ACUITY_SCORE: 53
CONCENTRATING,_REMEMBERING_OR_MAKING_DECISIONS_DIFFICULTY: NO
NUMBER_OF_TIMES_PATIENT_HAS_FALLEN_WITHIN_LAST_SIX_MONTHS: 2
ADLS_ACUITY_SCORE: 53
ADLS_ACUITY_SCORE: 52
BATHING: 1-->ASSISTANCE NEEDED
ADLS_ACUITY_SCORE: 53
ADLS_ACUITY_SCORE: 54
ADLS_ACUITY_SCORE: 53
ADLS_ACUITY_SCORE: 50
ADLS_ACUITY_SCORE: 53
ADLS_ACUITY_SCORE: 53
ADLS_ACUITY_SCORE: 50
EQUIPMENT_CURRENTLY_USED_AT_HOME: CANE, STRAIGHT;WALKER, STANDARD
ADLS_ACUITY_SCORE: 54
ADLS_ACUITY_SCORE: 53
ADLS_ACUITY_SCORE: 54
ADLS_ACUITY_SCORE: 52
ADLS_ACUITY_SCORE: 52
ADLS_ACUITY_SCORE: 53
ADLS_ACUITY_SCORE: 52
ADLS_ACUITY_SCORE: 53
ADLS_ACUITY_SCORE: 54
ADLS_ACUITY_SCORE: 53
ADLS_ACUITY_SCORE: 57
TOILETING: 1-->ASSISTANCE (EQUIPMENT/PERSON) NEEDED
TOILETING: 0-->NOT TOILET TRAINED OR ASSISTANCE NEEDED (DEVELOPMENTALLY APPROPRIATE)
ADLS_ACUITY_SCORE: 54
ADLS_ACUITY_SCORE: 54
ADLS_ACUITY_SCORE: 52
ADLS_ACUITY_SCORE: 50
ADLS_ACUITY_SCORE: 54
DRESS: 0-->ASSISTANCE NEEDED (DEVELOPMENTALLY APPROPRIATE)
ADLS_ACUITY_SCORE: 37
ADLS_ACUITY_SCORE: 52
FALL_HISTORY_WITHIN_LAST_SIX_MONTHS: YES
CHANGE_IN_FUNCTIONAL_STATUS_SINCE_ONSET_OF_CURRENT_ILLNESS/INJURY: NO
DIFFICULTY_EATING/SWALLOWING: NO
ADLS_ACUITY_SCORE: 54
ADLS_ACUITY_SCORE: 53
DRESSING/BATHING_DIFFICULTY: YES
ADLS_ACUITY_SCORE: 52
WEAR_GLASSES_OR_BLIND: NO
TRANSFERRING: 1-->ASSISTANCE (EQUIPMENT/PERSON) NEEDED (NOT DEVELOPMENTALLY APPROPRIATE)
ADLS_ACUITY_SCORE: 50
ADLS_ACUITY_SCORE: 53
ADLS_ACUITY_SCORE: 52
ADLS_ACUITY_SCORE: 37
ADLS_ACUITY_SCORE: 54
ADLS_ACUITY_SCORE: 39
WALKING_OR_CLIMBING_STAIRS_DIFFICULTY: YES
ADLS_ACUITY_SCORE: 52
ADLS_ACUITY_SCORE: 57
ADLS_ACUITY_SCORE: 48
WALKING_OR_CLIMBING_STAIRS: AMBULATION DIFFICULTY, ASSISTANCE 1 PERSON;STAIR CLIMBING DIFFICULTY, ASSISTANCE 1 PERSON;TRANSFERRING DIFFICULTY, ASSISTANCE 1 PERSON
ADLS_ACUITY_SCORE: 52
ADLS_ACUITY_SCORE: 52
ADLS_ACUITY_SCORE: 50
TOILETING_ASSISTANCE: TOILETING DIFFICULTY, ASSISTANCE 1 PERSON
ADLS_ACUITY_SCORE: 53
ADLS_ACUITY_SCORE: 52
DRESSING/BATHING: BATHING DIFFICULTY, ASSISTANCE 1 PERSON;DRESSING DIFFICULTY, ASSISTANCE 1 PERSON
ADLS_ACUITY_SCORE: 57
DOING_ERRANDS_INDEPENDENTLY_DIFFICULTY: YES
ADLS_ACUITY_SCORE: 52
ADLS_ACUITY_SCORE: 52
ADLS_ACUITY_SCORE: 53
ADLS_ACUITY_SCORE: 48
ADLS_ACUITY_SCORE: 50
ADLS_ACUITY_SCORE: 54
ADLS_ACUITY_SCORE: 52
ADLS_ACUITY_SCORE: 53
TOILETING_ISSUES: YES
ADLS_ACUITY_SCORE: 53
ADLS_ACUITY_SCORE: 53
DRESS: 1-->ASSISTANCE (EQUIPMENT/PERSON) NEEDED
ADLS_ACUITY_SCORE: 53
TRANSFERRING: 1-->ASSISTANCE (EQUIPMENT/PERSON) NEEDED
ADLS_ACUITY_SCORE: 57
ADLS_ACUITY_SCORE: 53
ADLS_ACUITY_SCORE: 53
ADLS_ACUITY_SCORE: 52
ADLS_ACUITY_SCORE: 54
ADLS_ACUITY_SCORE: 54
DEPENDENT_IADLS:: COOKING;LAUNDRY;SHOPPING;MEAL PREPARATION;TRANSPORTATION;CLEANING
ADLS_ACUITY_SCORE: 37

## 2022-01-01 ASSESSMENT — EXTERNAL EXAM - RIGHT EYE
OD_EXAM: NORMAL
OD_EXAM: NORMAL

## 2022-01-01 ASSESSMENT — VISUAL ACUITY
OS_PH_SC+: -2
OD_SC+: -2
OS_PH_SC: 20/20
METHOD: SNELLEN - LINEAR
OS_SC: 20/40
METHOD: SNELLEN - LINEAR
OD_SC+: -1
OD_SC: 20/20
OD_SC: 20/20
OS_SC: 20/200

## 2022-01-01 ASSESSMENT — SLIT LAMP EXAM - LIDS
COMMENTS: NORMAL

## 2022-01-01 ASSESSMENT — CONF VISUAL FIELD
OD_NORMAL: 1
METHOD: COUNTING FINGERS
OS_NORMAL: 1
OD_NORMAL: 1
OS_NORMAL: 1

## 2022-01-01 ASSESSMENT — PAIN SCALES - GENERAL
PAINLEVEL: SEVERE PAIN (7)
PAINLEVEL: NO PAIN (0)

## 2022-01-01 ASSESSMENT — TONOMETRY
IOP_METHOD: ICARE
OS_IOP_MMHG: 15
OD_IOP_MMHG: 20
IOP_METHOD: ICARE
OD_IOP_MMHG: 15
OS_IOP_MMHG: 20

## 2022-01-01 ASSESSMENT — EXTERNAL EXAM - LEFT EYE
OS_EXAM: NORMAL
OS_EXAM: NORMAL

## 2022-01-17 DIAGNOSIS — J45.20 MILD INTERMITTENT ASTHMA WITHOUT COMPLICATION: ICD-10-CM

## 2022-01-21 NOTE — TELEPHONE ENCOUNTER
albuterol (PROAIR HFA/PROVENTIL HFA/VENTOLIN HFA) 108 (90 Base) MCG/ACT inhaler   Inhale 2 puffs into the lungs every 6 hours as needed for shortness of breath / dyspnea - Inhalation     Last Written Prescription Date:  4/3/20  Last Fill Quantity: 75 g,   # refills: 0  Last Office Visit : 4/27/21  Future Office visit:  none    Routing refill request to provider for review/approval because:  Drug not on the [facility], [facility] or [facility] refill protocol or controlled substance

## 2022-01-24 RX ORDER — ALBUTEROL SULFATE 90 UG/1
2 AEROSOL, METERED RESPIRATORY (INHALATION) EVERY 6 HOURS PRN
Qty: 18 G | Refills: 1 | Status: SHIPPED | OUTPATIENT
Start: 2022-01-24 | End: 2022-01-01

## 2022-01-26 DIAGNOSIS — Z79.4 TYPE 2 DIABETES MELLITUS WITH HYPERGLYCEMIA, WITH LONG-TERM CURRENT USE OF INSULIN (H): ICD-10-CM

## 2022-01-26 DIAGNOSIS — E11.65 TYPE 2 DIABETES MELLITUS WITH HYPERGLYCEMIA, WITH LONG-TERM CURRENT USE OF INSULIN (H): ICD-10-CM

## 2022-02-01 RX ORDER — GABAPENTIN 300 MG/1
CAPSULE ORAL
Qty: 360 CAPSULE | Refills: 1 | Status: SHIPPED | OUTPATIENT
Start: 2022-02-01 | End: 2022-01-01

## 2022-02-03 ENCOUNTER — PRE VISIT (OUTPATIENT)
Dept: OPHTHALMOLOGY | Facility: CLINIC | Age: 47
End: 2022-02-03

## 2022-02-04 ENCOUNTER — TELEPHONE (OUTPATIENT)
Dept: SLEEP MEDICINE | Facility: CLINIC | Age: 47
End: 2022-02-04
Payer: MEDICARE

## 2022-02-04 NOTE — TELEPHONE ENCOUNTER
Phone call made to patient manual download needed prior to appointment. Patient to reach out medical supply company to set up a time to bring machine in. Patient states she will do it next week.       Raisa Rdz [facility]

## 2022-02-21 ENCOUNTER — TELEPHONE (OUTPATIENT)
Dept: OPHTHALMOLOGY | Facility: CLINIC | Age: 47
End: 2022-02-21
Payer: MEDICARE

## 2022-02-21 NOTE — TELEPHONE ENCOUNTER
LVM for patient regarding scheduling for a sooner appointment from the wait-list. Provided [facility] number for scheduling.

## 2022-02-26 ENCOUNTER — HEALTH MAINTENANCE LETTER (OUTPATIENT)
Age: 47
End: 2022-02-26

## 2022-03-01 ENCOUNTER — PRE VISIT (OUTPATIENT)
Dept: OPHTHALMOLOGY | Facility: CLINIC | Age: 47
End: 2022-03-01

## 2022-03-02 NOTE — TELEPHONE ENCOUNTER
FUTURE VISIT INFORMATION      FUTURE VISIT INFORMATION:    Date: 4/5/22    Time: 8:20am    Location: [facility]  REFERRAL INFORMATION:    Referring provider:  Ursula    Referring providers clinic:  [facility]    Reason for visit/diagnosis  Cataract eval      RECORDS REQUESTED FROM:         Clinic name Comments Records Status Imaging Status   [facility] Ov/referral 8/25/20  OV/notes 2/11/14-8/25/20 EPIC

## 2022-04-05 ENCOUNTER — PRE VISIT (OUTPATIENT)
Dept: OPHTHALMOLOGY | Facility: CLINIC | Age: 47
End: 2022-04-05

## 2022-04-05 ENCOUNTER — PRENATAL OFFICE VISIT (OUTPATIENT)
Dept: OPHTHALMOLOGY | Facility: CLINIC | Age: 47
End: 2022-04-05

## 2022-04-05 ENCOUNTER — OFFICE VISIT (OUTPATIENT)
Dept: OPHTHALMOLOGY | Facility: CLINIC | Age: 47
End: 2022-04-05
Payer: MEDICARE

## 2022-04-05 VITALS — HEIGHT: 66 IN | BODY MASS INDEX: 47.09 KG/M2 | WEIGHT: 293 LBS

## 2022-04-05 DIAGNOSIS — E11.3292 MILD NONPROLIFERATIVE DIABETIC RETINOPATHY OF LEFT EYE WITHOUT MACULAR EDEMA ASSOCIATED WITH TYPE 2 DIABETES MELLITUS (H): ICD-10-CM

## 2022-04-05 DIAGNOSIS — H25.813 MIXED TYPE AGE-RELATED CATARACT, BOTH EYES: Primary | ICD-10-CM

## 2022-04-05 PROCEDURE — 76519 ECHO EXAM OF EYE: CPT | Mod: 26 | Performed by: OPHTHALMOLOGY

## 2022-04-05 PROCEDURE — 92015 DETERMINE REFRACTIVE STATE: CPT | Mod: GY | Performed by: OPHTHALMOLOGY

## 2022-04-05 PROCEDURE — 99204 OFFICE O/P NEW MOD 45 MIN: CPT | Performed by: OPHTHALMOLOGY

## 2022-04-05 PROCEDURE — 76519 ECHO EXAM OF EYE: CPT | Mod: RT | Performed by: OPHTHALMOLOGY

## 2022-04-05 ASSESSMENT — CUP TO DISC RATIO
OD_RATIO: 0.3
OS_RATIO: 0.4

## 2022-04-05 ASSESSMENT — SLIT LAMP EXAM - LIDS
COMMENTS: NORMAL
COMMENTS: NORMAL

## 2022-04-05 ASSESSMENT — REFRACTION_MANIFEST
OS_ADD: +2.00
OD_CYLINDER: +0.50
OS_CYLINDER: SPHERE
OD_AXIS: 058
OD_SPHERE: -2.00
OD_ADD: +2.00
OS_SPHERE: -1.75

## 2022-04-05 ASSESSMENT — CONF VISUAL FIELD
METHOD: COUNTING FINGERS
OD_NORMAL: 1
OS_NORMAL: 1

## 2022-04-05 ASSESSMENT — VISUAL ACUITY
OS_PH_SC: 20/70
OS_SC: J8
OD_SC: 20/300
METHOD: SNELLEN - LINEAR
OD_PH_SC+: -2
OS_SC: 20/200
OS_PH_SC+: -1
OD_PH_SC: 20/60
OD_SC: J10

## 2022-04-05 ASSESSMENT — TONOMETRY
OD_IOP_MMHG: 16
IOP_METHOD: ICARE
OS_IOP_MMHG: 16

## 2022-04-05 ASSESSMENT — EXTERNAL EXAM - LEFT EYE: OS_EXAM: NORMAL

## 2022-04-05 ASSESSMENT — EXTERNAL EXAM - RIGHT EYE: OD_EXAM: NORMAL

## 2022-04-05 NOTE — NURSING NOTE
Chief Complaints and History of Present Illnesses   Patient presents with     Cataract Evaluation     Chief Complaint(s) and History of Present Illness(es)     Cataract Evaluation     Laterality: both eyes    Associated symptoms: blurred vision    Pain scale: 0/10              Comments     Patient states that vision each eye is so blurry having trouble with reading text messages. Not able to drive anymore with vision being so cloudy.   Vision is impacted in all areas, distance, near and midrange. Just generally foggy with each eye. Gradual decline with each eye the past year.   Having hard time seeing faces.   .......Lab Results       Component                Value               Date                       A1C                      >15.0               07/23/2021                 A1C                      >14.0               08/19/2020                 A1C                      8.4                 10/11/2018                 A1C                      9.6                 05/29/2018                 A1C                      9.0                 04/07/2017                 A1C                      12.1                03/10/2016    Has not noticed a fluctuation with with blood sugars. Reports A1C has been high with reading but has better with her BS reading at home.     Tomasa Woodruff, COT COT 8:20 AM April 5, 2022

## 2022-04-05 NOTE — PROGRESS NOTES
HPI  Pricilla Brown is a 47 year old female here for cataract evaluation.  Her vision has become so blurry she stopped driving and can't read her phone. comprehensive eye exam.  Blood glucose has been high.  No eye pain.      PMH:   Past Medical History:   Diagnosis Date     A-fib (H) 2011    on coumadin since 1/13     Asthma     as a kid     Chest pain 2/1/2017     Chronic anticoagulation for a-fib 2/15/2013    IN[facility]'s [facility]     Diabetes mellitus (H) 2012     Diastolic heart failure 2/15/2013     Dilated cardiomyopathy (H) 1/8/2013     HTN (hypertension)      Hyperthyroidism     [facility], s/p I131 1/13, now on prednisone and methimazole     Morbid obesity (H)      Pulmonary embolism (H) 1/12    hospitalized in [place], on lovenox/coumadin for a few months but stopped, hypercoag w/u neg per pt     Sleep apnea     using CPAP      POH: Glasses for myopia, cataracts at 2020 exam, no surgery, no trauma  Oc Meds: none  FH: Denies any glaucoma, age related macular degeneration, or other known eye diseases         Assessment & Plan     (H25.013) Cortical senile cataract of both eyes  (primary encounter diagnosis)  Comment: Visually significant right eye > left eye   The patient was advised that the current significant symptoms of blurred vision and/or glare were primarily secondary to cataracts.  The risks, benefits, and alternatives to cataract surgery with intraocular lens implant were discussed at length, and informed consent was obtained.  The patient will schedule cataract surgery in the near future.  Pre-operative measurements were taken with the IOL Master to plan appropriate lens power, a scan immersion will be done to confirm given low reliability.  Lens implant options were discussed including monofocal versus multifocal, toric for astigmatism, monovision, and refractive aim. The patient was advised of the necessity for a pre-operative physical with their primary physician. We will wait to see  what A1c is before scheduling surgery, needs to be lower.  The patient was also asked to arrange to have someone at home when returning from surgery.    Plan:  Phacoemulsification with intraocular lens implant right eye followed by left eye.     Surgical plan:  Best corrected visual acuity today is 20/60 right eye 20/70 left eye   1  +NSC    2+ACC  2+ posterior subcapsular cataract (PSC)     Special equipment/needs:    Anesthesia: MAC/Topical  Able to lie flat:  Yes, cpap  Dilation: Moderate   6.5mm  Alpha blocker/Flomax: No  Malyugen/Iris expansion: Malyugin possible   Anticoagulants: Yes  Guttata: No  Diabetes: Yes  Pseudoexfoliation: No pseudoexfoliation  Trauma: No  Trypan Blue: Yes  Refractive goal: plano    Cylinder:  < 1 D       (E11.3292) Mild nonproliferative diabetic retinopathy of left eye without macular edema associated with type 2 diabetes mellitus (H)  Comment: Mild NPDR with difficult views due to cataracts   Plan:  Educated patient on clinical findings and the importance of continued management with primary care physician. Will proceed with cataract extraction when blood glucose better control    (H52.13) Myopia, bilateral - Both Eyes (primary encounter diagnosis)  Comment: myopic shift  Plan: Refraction done and prescription for glasses given temporizing,  Patient understands new glasses will be needed after cataract surgery      -----------------------------------------------------------------------------------       Patient disposition:   Return in about 1 month (around 5/5/2022) for post-op cataract sx, PO  1. or patient to call sooner as needed.      Complete documentation of historical and exam elements from today's encounter can be found in the full encounter summary report (not reduplicated in this progress note). I personally obtained the chief complaint(s) and history of present illness.  I have confirmed and edited as necessary the CC, HPI, PMH/PSH, social history, FMH, ROS, and  exam/neuro findings as obtained by the technician or others. I have examined this patient myself and I personally viewed the image(s) and studies listed above and the documentation reflects my findings and interpretation.  I formulated and edited as necessary the assessment and plan and discussed the findings and management plan with the patient and family.     Radha Das MD

## 2022-04-08 PROBLEM — H25.813 MIXED TYPE AGE-RELATED CATARACT, BOTH EYES: Status: ACTIVE | Noted: 2022-01-01

## 2022-04-08 NOTE — TELEPHONE ENCOUNTER
Spoke with patient to schedule left eye surgery with Dr. Das    Surgery was scheduled on 5/23 at [facility]  Patient will have H&P at [facility]     Patient is aware a COVID-19 test is needed before their procedure. The test should be with-in 4 days of their procedure.   Test Details: Date 5/19 Location [facility]    Post-Op visit was scheduled on 5/24 AND 5/31  Patient was advised a / is needed day of surgery. As well as, for 24 hours after their surgery procedure.  Surgery packet was mailed 4/8, patient has my direct contact information for any further questions [phone].

## 2022-04-08 NOTE — TELEPHONE ENCOUNTER
Spoke with patient to schedule right eye surgery with Dr. Das    Surgery was scheduled on 5/2 at [facility]  Patient will have H&P at [facility]     Patient is aware a COVID-19 test is needed before their procedure. The test should be with-in 4 days of their procedure.   Test Details: Date 4/29 Location [facility]    Post-Op visit was scheduled on 5/3 AND 5/10  Patient was advised a / is needed day of surgery. As well as, for 24 hours after their surgery procedure.  Surgery packet was mailed 4/8, patient has my direct contact information for any further questions [phone].

## 2022-04-11 NOTE — TELEPHONE ENCOUNTER
Called and spoke to Pricilla Maguireuled her tech appointment for today for 4/18 @ 230 pm     Maite Figueroa Communication Facilitator on 4/11/2022 at 10:43 AM

## 2022-04-11 NOTE — TELEPHONE ENCOUNTER
[facility] Call Center    Phone Message    May a detailed message be left on voicemail: yes     Reason for Call: Other:   Pt wants to reschedule her tech appt. Pt hoping to schedule it for Thursday but states that she needs it this week. Call center does not schedule. Please call pt back.     Action Taken: Other:  eye    Travel Screening: Not Applicable

## 2022-04-18 NOTE — TELEPHONE ENCOUNTER
TOUJEO *300 UNITS/ML* SOLOSTAR      Last Written Prescription Date:  3/22/21  Last Fill Quantity: 230 ml,   # refills:  2  Last Office Visit : 11/23/21  Future Office visit:  4/26/22    Routing refill request to provider for review/approval because:  Insulin - refilled per clinic  Overdue for A1C  Lab Test 07/23/21  1150 08/19/20  1626 07/24/19  0000   A1C >15.0*   < >  --    HEMOGLOBINA1  --   --  10.1*     Had been following with Dr Wright for diabetes  Toujeo prescribed by DR Damon for weight management

## 2022-04-28 NOTE — TELEPHONE ENCOUNTER
"Request for medication refill:  albuterol (VENTOLIN HFA) 108 (90 Base) MCG/ACT inhaler    Providers if patient needs an appointment and you are willing to give a one month supply please refill for one month and  send a letter/MyChart using \".SMILLIMITEDREFILL\" .smillimited and route chart to \"P SMI \" (Giving one month refill in non controlled medications is strongly recommended before denial)    If refill has been denied, meaning absolutely no refills without visit, please complete the smart phrase \".smirxrefuse\" and route it to the \"P SMI MED REFILLS\"  pool to inform the patient and the pharmacy.    Lauren Rai MA        "

## 2022-04-28 NOTE — TELEPHONE ENCOUNTER
Called patient to follow up to confirm her A1C results.    Patient did not answer a message could not be left. A SMS message was left to call [phone] Elizabeth on 4/28/2022 at 12:43 PM

## 2022-04-29 NOTE — TELEPHONE ENCOUNTER
Per Dr. Pippa Espinosa in basket message.    Patients A1C is to high for patient to have surgery at the [facility].    Patient has been removed from the surgery schedule and all post op follow up appointments have been removed.    This writer will reach out to patient when she is ready to be rescheduled.    Patient has been notified and advised.     India Elizabeth on 4/29/2022 at 10:03 AM

## 2022-05-06 NOTE — TELEPHONE ENCOUNTER
Pen needle    4/26/2022  [facility]     Silva Damon MD    Endocrinology, Diabetes, and Metabolism

## 2022-05-19 NOTE — TELEPHONE ENCOUNTER
Spoke with patient to schedule right eye surgery with Dr. Das    Surgery was scheduled on 6/1 at [address] [address]  Patient will have H[facility]P at [facility]     Patient is aware a COVID-19 test is needed before their procedure. The test should be with-in 4 days of their procedure.   Test Details: Date 5/28 Location [facility]    Post-Op visit was scheduled on 6/2 AND 6/9  Patient was advised a / is needed day of surgery. As well as, for 24 hours after their surgery procedure.  Surgery packet was mailed 5/19, patient has my direct contact information for any further questions [phone].

## 2022-05-19 NOTE — TELEPHONE ENCOUNTER
Spoke with patient to schedule left eye surgery with Dr. Das    Surgery was scheduled on 6/9 at [facility] [address] [place] [address]    Patient will have H&P at [facility] ON 5/27     Patient is aware a COVID-19 test is needed before their procedure. The test should be with-in 4 days of their procedure.   Test Details: Date 6/6  Location [facility]    Post-Op visit was scheduled on 6/13 AND TBD.  Patient was advised a / is needed day of surgery. As well as, for 24 hours after their surgery procedure.  Surgery packet was mailed 5/19, patient has my direct contact information for any further questions [phone].

## 2022-05-20 NOTE — TELEPHONE ENCOUNTER
FUTURE VISIT INFORMATION      SURGERY INFORMATION:    H&P dos     Dr. Das    RECORDS REQUESTED FROM:        Primary Care Provider: Alesia Mcmullen kristi    Pertinent Medical History: debra, dilated cardiomyopathy, chronic diastolic heart failure    Most recent EKG+ Tracin19    Most recent PFT's: 13    Most recent Sleep Study:  3/4/13

## 2022-05-25 NOTE — TELEPHONE ENCOUNTER
Called patient to advise that she needs to schedule a pre op physical with her PCP.     Patient did not answer the phone. A message was left in detail to call this writer back to discuss.    India Elizabeth on 5/25/2022 at 11:19 AM

## 2022-05-27 NOTE — TELEPHONE ENCOUNTER
Patient called to advise that she is not able to get into the clinic to get her H&P completed.    Patient request to have her eye procedure with Dr. Das rescheduled.    Patient has been rescheduled for her left eye on 6/9 at the [facility].    Patient will have her H&P and covid completed at the [facility].    Patient will have follow up on 6/13.    Surgery letter mailed on 5/30    India Elizabeth on 5/30/2022 at 11:41 AM

## 2022-05-31 NOTE — TELEPHONE ENCOUNTER
This has been updated to patients right eye at [facility] the [facility] on 6/9.

## 2022-06-06 NOTE — TELEPHONE ENCOUNTER
[facility] Call Center    Phone Message    May a detailed message be left on voicemail: yes     Reason for Call: Medication Refill Request    Has the patient contacted the pharmacy for the refill? Yes   Name of medication being requested: Continuous Blood Gluc Sensor (FREESTYLE BELLO 14 DAY SENSOR) [facility]  Provider who prescribed the medication: Previously Dr. Wright.   Pharmacy: [facility] - [place] [address]   Date medication is needed: asap         Action Taken: Other: ENDO    Travel Screening: Not Applicable

## 2022-06-09 NOTE — PROGRESS NOTES
Pricilla and I had discussion today at [facility] prior to cataract surgery.  She still did not get a Hgb A1c drawn with endocrinology.  My recommendation at her initial consult with me was to wait for a much lower A1c prior to proceeding with this surgery, however she did not get it checked and pursued scheduling surgery.  She states her control of blood glucose has been much better than last fall when the A1c was extremely high.  She  had to cancel many of her endocrinology and dietician appointments and did not get the A1c lab drawn as ordered in April.    We discussed the risks of proceeding with eye surgery with sub-optimally controlled blood glucose.  Importantly, the risk of macular edema is higher, and this can cause blurry vision and require prolonged medical therapies.  She expressed understanding of these risks and wished to proceed with cataract surgery today.    Cataract surgery right eye was completed without complication.  I emphasized the importance of using her post operative anti-inflammatory drops exactly as directed to help reduce the risk of cystoid macular edema and we will monitor her closely as an outpatient.

## 2022-06-12 NOTE — PROGRESS NOTES
Post op day #4, status post cataract surgery, right eye  6/09/22    HPI: Patient states vision is much better. Patient states colors are brighter. Patient states she has never seen so well.  Denies eye pain.  Using drops.    Assessment/Plan:  (Z96.1) Pseudophakia of right eye   Comment:  Postoperative day #4, great post-operative appearance.  Wounds water-tight and IOP reasonable.    Plan:   Operative eye:    Prednisolone (white cap, milky drop) 4 times daily   Ketorolac (gray cap) 4 times daily  Patient received intracameral Moxifloxacin in surgery    Instructions for patient reviewed:  Eye protection at all times and eye shield at night for 1 week.  Limited activities with no exercise or heavy lifting for 1 week.  Reviewed high importance of using prolonged drop schedule as discussed to help reduce risk of cystoid macular edema   Instructed patient to contact immediately for decreasing vision, eye pain, increased redness, new floaters or flashes of light, or other concerning symptoms.  Followup:  Return in about 6 weeks (around 7/25/2022) for post-op cataract sx, POM  1  OD, OCT macula OU.        Complete documentation of historical and exam elements from today's encounter can be found in the full encounter summary report (not reduplicated in this progress note). I personally obtained the chief complaint(s) and history of present illness.  I have confirmed and edited as necessary the CC, HPI, PMH/PSH, social history, FMH, ROS, and exam/neuro findings as obtained by the technician or others. I have examined this patient myself and I personally viewed the image(s) and studies listed above and the documentation reflects my findings and interpretation.  I formulated and edited as necessary the assessment and plan and discussed the findings and management plan with the patient and family.     Radha Das MD

## 2022-06-13 NOTE — TELEPHONE ENCOUNTER
LVM for patient regarding scheduling a Return in about 6 weeks (around 7/25/2022) for post-op cataract sx, POM 1 OD.  Provided direct number for scheduling needs.

## 2022-06-13 NOTE — PATIENT INSTRUCTIONS
Please use the following drops until your doctor discontinues.  When giving drops, allow 1 minute between drops.  Do not touch the bottle to the eye.  Drops may sting momentarily.    WEEK 1 (until 6/18)      Prednisolone (milky, white cap) 4 times daily in the surgical eye  Ketorolac (gray) 4 times daily in the surgical eye         June 18-  July 23    Prednisolone (milky, white cap) 3 times daily in the surgical eye   Ketorolac (gray) 3 times daily in the surgical eye       July 24- July/30    Prednisolone (milky, white cap) 2 times daily in the surgical eye   Ketorolac (gray) 2 times daily in the surgical eye         July 31- Aug 6th    Prednisolone (milky, white cap) 1 time daily in the surgical eye for 1 week, then STOP    Ketorolac (gray) 4 times daily in the surgical eye for this week then STOP

## 2022-06-13 NOTE — NURSING NOTE
Chief Complaints and History of Present Illnesses   Patient presents with     Follow Up For Surgery Of Eye     Chief Complaint(s) and History of Present Illness(es)     Follow Up For Surgery Of Eye     Laterality: right eye    Associated symptoms: Negative for eye pain, redness, flashes and floaters              Comments     Patient present following cataract sx right eye. Patient states vision is much better. Patient states colors are brighter. Patient states she has never seen so well.     VALERIANO Velásquez June 13, 2022 9:31 AM

## 2022-06-15 NOTE — PATIENT INSTRUCTIONS
"You were seen today in the Cardiovascular Clinic at the [facility].      Cardiology Providers you saw during your visit:  Dr. Percy Alcala     Recommendations:   Refill entered for Bumex 5mg twice daily.  Please follow-up with Dr. Alcala 3-4 months with labs prior.      Eat a heart healthy, low sodium diet.  Get 20 to 30 minutes of aerobic exercise 4 to 5 times per week as tolerated. (Examples of aerobic exercise include: walking, bicycling, swimming, running).     Thank you for your visit today!   Please MyChart message or call if you have any questions or concerns.      During Business Hours:  [phone], option # 1 ([place])       After hours, weekends or holidays:   [phone], Option #4  Ask to speak to the On-Call Cardiologist. Inform them you are a heart failure patient at the [place].      Ophelia Kenny RN BSN CHFN  Cardiology Care Coordinator - [facility]  Questions and [phone]  First press #1 for the CallGrader and then press #4 for \"Your Care Team\" to reach us Cardiology Nurses.                "

## 2022-06-15 NOTE — PROGRESS NOTES
Medical Director   [facility]    [address] [place] [address]        Kristen Cuello NP   [facility]   [address], [address] [place] [address]       Formerly [facility]   Coagulation Clinic       NANETTE Chin   [facility] Endocrinology    [address]   [place] [address]      Jamilah Bernal MD   [facility] Internal Medicine   [address] [place] [address]        Impression:  1. Obesity  2. Diabetes  3. Smoking  4. Elevated body index  5. Hypothyroidism  6. Atrial fibrillation paroxysmal      The patient returns for follow-up of heart failure.  There is no interim history of chest pain, tightness, paroxysmal nocturnal dyspnea, orthopnea, peripheral edema, palpitation, pre-syncope, syncope, device discharge.  Exercise tolerance is stable.  The patient is attempting to exercise regularly and following a sodium restricted, calorically appropriate diet.  Medications are reviewed and the patient is taking medications as prescribed.  The patient is generally sleeping well.     Plan:  1. Continue weight loss  2. Increase exercise    Constitutional: alert, oriented, abnormal gait and station, normal mentation.  Oral: moist mucous membranes  Lymph: without pathologic adenopathy  Chest: clear to ausculation and percussion save for basilar rales and dullness  Cor: No evidence of left or right ventricular activity.  Rhythm is regular.  S1 normal, S2 split physiologically. Murmurs are not present  Abdomen: without tenderness, rebound, guarding, masses, ascites  Extremities: lymphedema   Neuro: no focal defects, normal mentation  Skin: without open lesions chronic lymphedema  Psych: oriented, verbal, mental status in tact        Constitutional:some weight loss,but no fever, chills, night sweats  HEENT: without visual changes, swallow difficulties recent cataract  Pulmonary: with some  shortness of breath, cough, wheeze, hemoptysis  Cardiac: without  chest pain, MURRAY, PND, orthopnea, edema, palpitation, pre-syncope, syncope,  GI: without diarrhea, constipation, jaundice, melena, GERD, hematemesis  : without frequency, urgency, dysuria, hematuria  Skin: rash, bruise, open lesions  Neuro: without TIA, focal neurologic complaints, seizure, trauma  Ortho: without pain, swelling, mobility impairment  Endocrine: diabetes, thyroid, heat/cold intolerance, polyuria, polyphagia, change bowel habits.  Sleep:no JYOTI, periodic breathing, fatigue      Current Outpatient Medications   Medication     ACCU-CHEK RON PLUS test strip     acetaminophen (TYLENOL) 325 MG tablet     albuterol (VENTOLIN HFA) 108 (90 Base) MCG/ACT inhaler     apixaban ANTICOAGULANT (ELIQUIS) 5 MG tablet     atorvastatin (LIPITOR) 40 MG tablet     bisacodyl (DULCOLAX) 10 MG suppository     blood glucose (NO BRAND SPECIFIED) lancets standard     blood glucose (NO BRAND SPECIFIED) test strip     blood glucose (NO BRAND SPECIFIED) test strip     blood glucose (NO BRAND SPECIFIED) test strip     blood glucose monitoring (ACCU-CHEK FASTCLIX) lancets     blood glucose monitoring (IBG STAR) meter device kit     blood glucose monitoring (NO BRAND SPECIFIED) meter device kit     blood glucose monitoring (NO BRAND SPECIFIED) meter device kit     bumetanide (BUMEX) 1 MG tablet     capsaicin (ZOSTRIX) 0.025 % external cream     ciclopirox (LOPROX) 0.77 % cream     ciclopirox (LOPROX) 0.77 % cream     ciclopirox (PENLAC) 8 % external solution     ciprofloxacin (CIPRO) 500 MG tablet     clotrimazole (LOTRIMIN) 1 % external cream     colchicine (COLCYRS) 0.6 MG tablet     colchicine (COLCYRS) 0.6 MG tablet     Continuous Blood Gluc  (FREESTYLE BELLO 14 DAY READER) ADRIANO     Continuous Blood Gluc Sensor (FREESTYLE BELLO 14 DAY SENSOR) MISC     Continuous Blood Gluc Sensor (FREESTYLE BELLO 14 DAY SENSOR) MISC     diclofenac (VOLTAREN) 1 % topical gel     DULoxetine (CYMBALTA) 20 MG capsule     Efinaconazole 10 %  SOLN     gabapentin (NEURONTIN) 300 MG capsule     hydrALAZINE (APRESOLINE) 25 MG tablet     insulin aspart (NOVOLOG FLEXPEN) 100 UNIT/ML pen     insulin glargine U-300 (TOUJEO SOLOSTAR) 300 UNIT/ML (1 units dial) pen     insulin pen needle (BD RIVER U/F) 32G X 4 MM miscellaneous     isosorbide dinitrate (ISORDIL) 20 MG tablet     JARDIANCE 25 MG TABS tablet     levothyroxine (SYNTHROID/LEVOTHROID) 200 MCG tablet     methylPREDNISolone (MEDROL DOSEPAK) 4 MG tablet therapy pack     metoprolol succinate ER (TOPROL-XL) 200 MG 24 hr tablet     nicotine (NICODERM CQ) 14 MG/24HR 24 hr patch     nystatin (MYCOSTATIN) 430765 UNIT/GM external cream     order for DME     order for DME     order for DME     ORDER FOR DME     oxyCODONE-acetaminophen (PERCOCET) 5-325 MG per tablet     polyethylene glycol (MIRALAX/GLYCOLAX) powder     potassium chloride ER (KLOR-CON M) 20 MEQ CR tablet     Respiratory Therapy Supplies (NEBULIZER COMPRESSOR) KIT     Semaglutide, 1 MG/DOSE, 4 MG/3ML SOPN     senna (SENOKOT) 8.6 MG tablet     spironolactone (ALDACTONE) 50 MG tablet     tiotropium (SPIRIVA HANDIHALER) 18 MCG inhalation capsule     topiramate (TOPAMAX) 25 MG tablet     No current facility-administered medications for this visit.     Facility-Administered Medications Ordered in Other Visits   Medication     sodium chloride (PF) 0.9% PF flush 10 mL       Wt Readings from Last 24 Encounters:   06/15/22 (!) 189.8 kg (418 lb 8 oz)   04/05/22 (!) 193.2 kg (426 lb)   11/23/21 (!) 193.2 kg (426 lb)   10/20/21 (!) 193.2 kg (426 lb)   09/09/21 (!) 196.2 kg (432 lb 8 oz)   07/23/21 (!) 198.1 kg (436 lb 12.8 oz)   09/28/20 (!) 222.3 kg (490 lb)   06/19/20 (!) 222.3 kg (490 lb)   10/29/19 (!) 226.7 kg (499 lb 11.2 oz)   07/24/19 (!) 223.9 kg (493 lb 9.6 oz)   07/23/19 (!) 229.5 kg (506 lb)   06/27/19 (!) 225.9 kg (498 lb)   03/13/19 (!) 227.2 kg (500 lb 14.4 oz)   03/13/19 (!) 227.3 kg (501 lb)   02/19/19 (!) 229.5 kg (506 lb)   01/07/19 (!) 224.6  kg (495 lb 1.6 oz)   18 (!) 220.1 kg (485 lb 4.8 oz)   18 (!) 220 kg (485 lb)   10/30/18 (!) 215.1 kg (474 lb 1.6 oz)   10/25/18 (!) 211.4 kg (466 lb)   10/11/18 (!) 211.5 kg (466 lb 4.8 oz)   10/09/18 (!) 207 kg (456 lb 4.8 oz)   18 (!) 206.8 kg (456 lb)   18 (!) 210.7 kg (464 lb 9.6 oz)        Latest Reference Range & Units 06/15/22 15:31   Sodium 133 - 144 mmol/L 135   Potassium 3.4 - 5.3 mmol/L 3.5   Chloride 94 - 109 mmol/L 103   Carbon Dioxide 20 - 32 mmol/L 29   Urea Nitrogen 7 - 30 mg/dL 17   Creatinine 0.52 - 1.04 mg/dL 0.96   GFR Estimate >60 mL/min/1.73m2 73 [1]   Calcium 8.5 - 10.1 mg/dL 9.3   Anion Gap 3 - 14 mmol/L 3   Albumin 3.4 - 5.0 g/dL 2.8 (L)   Protein Total 6.8 - 8.8 g/dL 7.8   Bilirubin Total 0.2 - 1.3 mg/dL 0.5   Alkaline Phosphatase 40 - 150 U/L 84   ALT 0 - 50 U/L 51 (H)   AST 0 - 45 U/L 17   Cholesterol <200 mg/dL 179   Patient Fasting > 8hrs?  No   HDL Cholesterol >=50 mg/dL 55   LDL Cholesterol Calculated <=100 mg/dL 93   Non HDL Cholesterol <130 mg/dL 124   N-Terminal Pro Bnp 0 - 450 pg/mL 1,328 (H) [2]   Triglycerides <150 mg/dL 157 (H)   Glucose 70 - 99 mg/dL 163 (H)   WBC 4.0 - 11.0 10e3/uL 10.2   Hemoglobin 11.7 - 15.7 g/dL 16.0 (H)   Hematocrit 35.0 - 47.0 % 48.5 (H)   Platelet Count 150 - 450 10e3/uL 261   RBC Count 3.80 - 5.20 10e6/uL 5.32 (H)   MCV 78 - 100 fL 91   MCH 26.5 - 33.0 pg 30.1   MCHC 31.5 - 36.5 g/dL 33.0   RDW 10.0 - 15.0 % 13.2       MRN: [id number]  : 1975  Study Date: 2022 02:10 PM  Age: 47 yrs  Gender: Female  Patient [facility]: [facility]  Reason For Study: Acute on chronic heart failure with preserved ejection  fraction  Ordering Physician: CHERYL PAINTING  Referring Physician: CHERYL PAINTING  Performed By: Eufemia Bob RDCS     BSA: 2.8 m2  Height: 66 in  Weight: 426 lb  ______________________________________________________________________________  Procedure  Echocardiogram with two-dimensional, color and  spectral Doppler performed.  Contrast Optison. Technically difficult study.Extremely poor acoustic windows.  Limited information was obtained during study. Optison (NDC #8908-8326-28)  given intravenously. Patient was given 7 ml mixture of 3 ml Optison and 6 ml  saline. 3 ml wasted. IV start location RAC .  ______________________________________________________________________________  Interpretation Summary     Technically difficult study.Extremely poor acoustic windows.  Limited information was obtained during study.  Left ventricular size is normal.  The visual ejection fraction is 40-45%.  Right ventricular function cannot be assessed due to poor image quality.  ______________________________________________________________________________  Left Ventricle  Left ventricular size is normal. The visual ejection fraction is 40-45%.     Right Ventricle  Right ventricular function cannot be assessed due to poor image quality.     Atria  The atria cannot be assessed.     Mitral Valve  The mitral valve cannot be assessed.     Aortic Valve  The aortic valve cannot be assessed.     Tricuspid Valve  The tricuspid valve cannot be assessed.     Pulmonic Valve  The pulmonic valve cannot be assessed.     Vessels  The thoracic aorta cannot be assessed. The pulmonary artery cannot be  assessed. The inferior vena cava is normal.     Pericardium  No pericardial effusion is present.

## 2022-06-15 NOTE — NURSING NOTE
Chief Complaint   Patient presents with     Follow Up     Return Heart Failure- 47yr old female presents with HFpEF for follow up and to re-establish care with labs prior     Vitals were taken and medications reconciled.    Philip Warren, EMT  4:02 PM

## 2022-06-15 NOTE — LETTER
6/15/2022      RE: Pricilla Brown  [address]  [facility] [address]       Dear Colleague,    Thank you for the opportunity to participate in the care of your patient, Pricilla Brown, at the [facility] [place] [facility]. Please see a copy of my visit note below.      Medical Director   [facility]    [address] [place] [address]        Kristen Cuello NP   [facility]   [address], [address] [place] [address]       [facility]   Coagulation Clinic       NANETTE Chin   [facility] Endocrinology    [address]   [place] [address]      Jamilah Bernal MD   [facility] Internal Medicine   [address] [address]        Impression:  1. Obesity  2. Diabetes  3. Smoking  4. Elevated body index  5. Hypothyroidism  6. Atrial fibrillation paroxysmal      The patient returns for follow-up of heart failure.  There is no interim history of chest pain, tightness, paroxysmal nocturnal dyspnea, orthopnea, peripheral edema, palpitation, pre-syncope, syncope, device discharge.  Exercise tolerance is stable.  The patient is attempting to exercise regularly and following a sodium restricted, calorically appropriate diet.  Medications are reviewed and the patient is taking medications as prescribed.  The patient is generally sleeping well.     Plan:  1. Continue weight loss  2. Increase exercise    Constitutional: alert, oriented, abnormal gait and station, normal mentation.  Oral: moist mucous membranes  Lymph: without pathologic adenopathy  Chest: clear to ausculation and percussion save for basilar rales and dullness  Cor: No evidence of left or right ventricular activity.  Rhythm is regular.  S1 normal, S2 split physiologically. Murmurs are not present  Abdomen: without tenderness, rebound, guarding, masses, ascites  Extremities: lymphedema   Neuro: no focal  defects, normal mentation  Skin: without open lesions chronic lymphedema  Psych: oriented, verbal, mental status in tact        Constitutional:some weight loss,but no fever, chills, night sweats  HEENT: without visual changes, swallow difficulties recent cataract  Pulmonary: with some  shortness of breath, cough, wheeze, hemoptysis  Cardiac: without chest pain, MURRAY, PND, orthopnea, edema, palpitation, pre-syncope, syncope,  GI: without diarrhea, constipation, jaundice, melena, GERD, hematemesis  : without frequency, urgency, dysuria, hematuria  Skin: rash, bruise, open lesions  Neuro: without TIA, focal neurologic complaints, seizure, trauma  Ortho: without pain, swelling, mobility impairment  Endocrine: diabetes, thyroid, heat/cold intolerance, polyuria, polyphagia, change bowel habits.  Sleep:no JYOTI, periodic breathing, fatigue      Current Outpatient Medications   Medication     ACCU-CHEK RON PLUS test strip     acetaminophen (TYLENOL) 325 MG tablet     albuterol (VENTOLIN HFA) 108 (90 Base) MCG/ACT inhaler     apixaban ANTICOAGULANT (ELIQUIS) 5 MG tablet     atorvastatin (LIPITOR) 40 MG tablet     bisacodyl (DULCOLAX) 10 MG suppository     blood glucose (NO BRAND SPECIFIED) lancets standard     blood glucose (NO BRAND SPECIFIED) test strip     blood glucose (NO BRAND SPECIFIED) test strip     blood glucose (NO BRAND SPECIFIED) test strip     blood glucose monitoring (ACCU-CHEK FASTCLIX) lancets     blood glucose monitoring (IBG STAR) meter device kit     blood glucose monitoring (NO BRAND SPECIFIED) meter device kit     blood glucose monitoring (NO BRAND SPECIFIED) meter device kit     bumetanide (BUMEX) 1 MG tablet     capsaicin (ZOSTRIX) 0.025 % external cream     ciclopirox (LOPROX) 0.77 % cream     ciclopirox (LOPROX) 0.77 % cream     ciclopirox (PENLAC) 8 % external solution     ciprofloxacin (CIPRO) 500 MG tablet     clotrimazole (LOTRIMIN) 1 % external cream     colchicine (COLCYRS) 0.6 MG tablet      colchicine (COLCYRS) 0.6 MG tablet     Continuous Blood Gluc  (FREESTYLE BELLO 14 DAY READER) ADRIANO     Continuous Blood Gluc Sensor (FREESTYLE BELLO 14 DAY SENSOR) MISC     Continuous Blood Gluc Sensor (FREESTYLE BELLO 14 DAY SENSOR) MISC     diclofenac (VOLTAREN) 1 % topical gel     DULoxetine (CYMBALTA) 20 MG capsule     Efinaconazole 10 % SOLN     gabapentin (NEURONTIN) 300 MG capsule     hydrALAZINE (APRESOLINE) 25 MG tablet     insulin aspart (NOVOLOG FLEXPEN) 100 UNIT/ML pen     insulin glargine U-300 (TOUJEO SOLOSTAR) 300 UNIT/ML (1 units dial) pen     insulin pen needle (BD RIVER U/F) 32G X 4 MM miscellaneous     isosorbide dinitrate (ISORDIL) 20 MG tablet     JARDIANCE 25 MG TABS tablet     levothyroxine (SYNTHROID/LEVOTHROID) 200 MCG tablet     methylPREDNISolone (MEDROL DOSEPAK) 4 MG tablet therapy pack     metoprolol succinate ER (TOPROL-XL) 200 MG 24 hr tablet     nicotine (NICODERM CQ) 14 MG/24HR 24 hr patch     nystatin (MYCOSTATIN) 757424 UNIT/GM external cream     order for DME     order for DME     order for DME     ORDER FOR DME     oxyCODONE-acetaminophen (PERCOCET) 5-325 MG per tablet     polyethylene glycol (MIRALAX/GLYCOLAX) powder     potassium chloride ER (KLOR-CON M) 20 MEQ CR tablet     Respiratory Therapy Supplies (NEBULIZER COMPRESSOR) KIT     Semaglutide, 1 MG/DOSE, 4 MG/3ML SOPN     senna (SENOKOT) 8.6 MG tablet     spironolactone (ALDACTONE) 50 MG tablet     tiotropium (SPIRIVA HANDIHALER) 18 MCG inhalation capsule     topiramate (TOPAMAX) 25 MG tablet     No current facility-administered medications for this visit.     Facility-Administered Medications Ordered in Other Visits   Medication     sodium chloride (PF) 0.9% PF flush 10 mL       Wt Readings from Last 24 Encounters:   06/15/22 (!) 189.8 kg (418 lb 8 oz)   04/05/22 (!) 193.2 kg (426 lb)   11/23/21 (!) 193.2 kg (426 lb)   10/20/21 (!) 193.2 kg (426 lb)   09/09/21 (!) 196.2 kg (432 lb 8 oz)   07/23/21 (!) 198.1 kg (436  lb 12.8 oz)   20 (!) 222.3 kg (490 lb)   20 (!) 222.3 kg (490 lb)   10/29/19 (!) 226.7 kg (499 lb 11.2 oz)   19 (!) 223.9 kg (493 lb 9.6 oz)   19 (!) 229.5 kg (506 lb)   19 (!) 225.9 kg (498 lb)   19 (!) 227.2 kg (500 lb 14.4 oz)   19 (!) 227.3 kg (501 lb)   19 (!) 229.5 kg (506 lb)   19 (!) 224.6 kg (495 lb 1.6 oz)   18 (!) 220.1 kg (485 lb 4.8 oz)   18 (!) 220 kg (485 lb)   10/30/18 (!) 215.1 kg (474 lb 1.6 oz)   10/25/18 (!) 211.4 kg (466 lb)   10/11/18 (!) 211.5 kg (466 lb 4.8 oz)   10/09/18 (!) 207 kg (456 lb 4.8 oz)   18 (!) 206.8 kg (456 lb)   18 (!) 210.7 kg (464 lb 9.6 oz)        Latest Reference Range & Units 06/15/22 15:31   Sodium 133 - 144 mmol/L 135   Potassium 3.4 - 5.3 mmol/L 3.5   Chloride 94 - 109 mmol/L 103   Carbon Dioxide 20 - 32 mmol/L 29   Urea Nitrogen 7 - 30 mg/dL 17   Creatinine 0.52 - 1.04 mg/dL 0.96   GFR Estimate >60 mL/min/1.73m2 73 [1]   Calcium 8.5 - 10.1 mg/dL 9.3   Anion Gap 3 - 14 mmol/L 3   Albumin 3.4 - 5.0 g/dL 2.8 (L)   Protein Total 6.8 - 8.8 g/dL 7.8   Bilirubin Total 0.2 - 1.3 mg/dL 0.5   Alkaline Phosphatase 40 - 150 U/L 84   ALT 0 - 50 U/L 51 (H)   AST 0 - 45 U/L 17   Cholesterol <200 mg/dL 179   Patient Fasting > 8hrs?  No   HDL Cholesterol >=50 mg/dL 55   LDL Cholesterol Calculated <=100 mg/dL 93   Non HDL Cholesterol <130 mg/dL 124   N-Terminal Pro Bnp 0 - 450 pg/mL 1,328 (H) [2]   Triglycerides <150 mg/dL 157 (H)   Glucose 70 - 99 mg/dL 163 (H)   WBC 4.0 - 11.0 10e3/uL 10.2   Hemoglobin 11.7 - 15.7 g/dL 16.0 (H)   Hematocrit 35.0 - 47.0 % 48.5 (H)   Platelet Count 150 - 450 10e3/uL 261   RBC Count 3.80 - 5.20 10e6/uL 5.32 (H)   MCV 78 - 100 fL 91   MCH 26.5 - 33.0 pg 30.1   MCHC 31.5 - 36.5 g/dL 33.0   RDW 10.0 - 15.0 % 13.2       MRN: [id number]  : 1975  Study Date: 2022 02:10 PM  Age: 47 yrs  Gender: Female  Patient Location: [facility]  Reason For Study: Acute on chronic heart  failure with preserved ejection  fraction  Ordering Physician: PERCY ALCALA  Referring Physician: PERCY ALCALA  Performed By: Eufemia Bob RDCS     BSA: 2.8 m2  Height: 66 in  Weight: 426 lb  ______________________________________________________________________________  Procedure  Echocardiogram with two-dimensional, color and spectral Doppler performed.  Contrast Optison. Technically difficult study.Extremely poor acoustic windows.  Limited information was obtained during study. Optison (NDC #4049-9914-42)  given intravenously. Patient was given 7 ml mixture of 3 ml Optison and 6 ml  saline. 3 ml wasted. IV start location RAC .  ______________________________________________________________________________  Interpretation Summary     Technically difficult study.Extremely poor acoustic windows.  Limited information was obtained during study.  Left ventricular size is normal.  The visual ejection fraction is 40-45%.  Right ventricular function cannot be assessed due to poor image quality.  ______________________________________________________________________________  Left Ventricle  Left ventricular size is normal. The visual ejection fraction is 40-45%.     Right Ventricle  Right ventricular function cannot be assessed due to poor image quality.     Atria  The atria cannot be assessed.     Mitral Valve  The mitral valve cannot be assessed.     Aortic Valve  The aortic valve cannot be assessed.     Tricuspid Valve  The tricuspid valve cannot be assessed.     Pulmonic Valve  The pulmonic valve cannot be assessed.     Vessels  The thoracic aorta cannot be assessed. The pulmonary artery cannot be  assessed. The inferior vena cava is normal.     Pericardium  No pericardial effusion is present.      Please do not hesitate to contact me if you have any questions/concerns.     Sincerely,     Percy Alcala MD

## 2022-06-17 NOTE — PROGRESS NOTES
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 She who is here for follow-up of diabetes.      Last seen in weight clinic 11/23/21:    At most recent visit taking Trulicity 3.0 mg weekly, Jardiance 25 mg, bupropion 100 mg bid, Toujeo 225 units in the morning, Novolog 100 units plus correction. She could not tolerate metformin.      She is no longer on topiramate. She felt it worked in the past but not anymore.     Interim:  Had eye surgery for cataract.        Today:  Reports  - 300s. Not higher.  Eats 9am, 1 pm and 5:30 pm.  Tries only light snacks at night.  Has not sure about her dosing, but feels rather confident her Toujeo dose is 190 morning.  And generally giving 170 units of NovoLog before each meal.  Sometimes they will use a sliding scale to start and give 100 units plus sliding scale, but this is generally around 270 units and frequently she and her PCA  just give that.    She has been losing some weight and having more energy with that.  She reports she started her gabapentin 600 units daily some years ago and initially it helps but it does not seem to be as helpful.  She is wondering if she could increase the dose.  She denies any fatigue with this.      Current Diabetes Medications:  Toujeo 190 daily  - couldn't see.  Jardiance 25 mg - no yeast infections any more.    Ozempic 1mg weekly  Novolog 170 per meal.  100 plus siding scale.      We reviewed glucometer/CGMS data together.  It revealed:  She forgot to bring her glucometer.  She reports all values have been between 200 and 399.      Pricilla's PMH, PSH and allergies were reviewed  today and pertinent information is summarized above.    Pricilla's pertinent social and family history are also reviewed today and pertinent information is summarized above.  Also noted is: She has a PCA    Current Outpatient Medications   Medication     ACCU-CHEK RON PLUS test strip     acetaminophen (TYLENOL) 325 MG tablet     albuterol (VENTOLIN HFA) 108 (90 Base) MCG/ACT inhaler     apixaban ANTICOAGULANT (ELIQUIS) 5 MG tablet     atorvastatin (LIPITOR) 40 MG tablet     bisacodyl (DULCOLAX) 10 MG suppository     blood glucose (NO BRAND SPECIFIED) lancets standard     blood glucose (NO BRAND SPECIFIED) test strip     blood glucose (NO BRAND SPECIFIED) test strip     blood glucose (NO BRAND SPECIFIED) test strip     blood glucose monitoring (ACCU-CHEK FASTCLIX) lancets     blood glucose monitoring (IBG STAR) meter device kit     blood glucose monitoring (NO BRAND SPECIFIED) meter device kit     blood glucose monitoring (NO BRAND SPECIFIED) meter device kit     bumetanide (BUMEX) 1 MG tablet     capsaicin (ZOSTRIX) 0.025 % external cream     ciclopirox (LOPROX) 0.77 % cream     ciclopirox (PENLAC) 8 % external solution     clotrimazole (LOTRIMIN) 1 % external cream     colchicine (COLCYRS) 0.6 MG tablet     colchicine (COLCYRS) 0.6 MG tablet     diclofenac (VOLTAREN) 1 % topical gel     Efinaconazole 10 % SOLN     gabapentin (NEURONTIN) 300 MG capsule     hydrALAZINE (APRESOLINE) 25 MG tablet     insulin aspart (NOVOLOG FLEXPEN) 100 UNIT/ML pen     insulin glargine U-300 (TOUJEO SOLOSTAR) 300 UNIT/ML (1 units dial) pen     insulin pen needle (BD RIVER U/F) 32G X 4 MM miscellaneous     isosorbide dinitrate (ISORDIL) 20 MG tablet     JARDIANCE 25 MG TABS tablet     ketorolac (ACULAR) 0.5 % ophthalmic solution     levothyroxine (SYNTHROID/LEVOTHROID) 200 MCG tablet     metoprolol succinate ER (TOPROL-XL) 200 MG 24 hr tablet     nystatin (MYCOSTATIN) 953350 UNIT/GM external cream     order for DME     order for DME      order for DME     ORDER FOR DME     oxyCODONE-acetaminophen (PERCOCET) 5-325 MG per tablet     polyethylene glycol (MIRALAX/GLYCOLAX) powder     potassium chloride ER (KLOR-CON M) 20 MEQ CR tablet     prednisoLONE acetate (PRED FORTE) 1 % ophthalmic suspension     Respiratory Therapy Supplies (NEBULIZER COMPRESSOR) KIT     Semaglutide, 1 MG/DOSE, 4 MG/3ML SOPN     senna (SENOKOT) 8.6 MG tablet     spironolactone (ALDACTONE) 50 MG tablet     tiotropium (SPIRIVA HANDIHALER) 18 MCG inhalation capsule     topiramate (TOPAMAX) 25 MG tablet     ciclopirox (LOPROX) 0.77 % cream     ciprofloxacin (CIPRO) 500 MG tablet     Continuous Blood Gluc  (FREESTYLE BELLO 14 DAY READER) ADRIANO     Continuous Blood Gluc Sensor (FREESTYLE BELLO 14 DAY SENSOR) MISC     Continuous Blood Gluc Sensor (FREESTYLE BELLO 14 DAY SENSOR) MISC     DULoxetine (CYMBALTA) 20 MG capsule     methylPREDNISolone (MEDROL DOSEPAK) 4 MG tablet therapy pack     nicotine (NICODERM CQ) 14 MG/24HR 24 hr patch     No current facility-administered medications for this visit.     Facility-Administered Medications Ordered in Other Visits   Medication     sodium chloride (PF) 0.9% PF flush 10 mL         ROS:   Patient denies any fevers, chills or sweats as well as any changes in vision, problems with floaters or field cuts in vision, pain or problems with dentition, new or different headaches.  Patient denies symptoms of hypo and hyperglycemia except as above.   Patients denies marked fatigue, cough, shortness of breath, chest pain or pressure.  There has been no pain with or other changes in urination or  itching or pain in genital areas.  Patient denies any noted swelling in feet, ankles or otherwise, loss of sensation or pain  in feet or other areas.    Patient also denies current difficulties with depressed mood, anhedonia or worrying too much.        Exam:    BP (!) 153/99 (BP Location: Left arm, Patient Position: Sitting, Cuff Size: Adult Regular)    "Pulse 87   Temp 99  F (37.2  C) (Oral)   Ht 1.676 m (5' 6\")   Wt (!) 191 kg (421 lb)   SpO2 94%   BMI 67.95 kg/m      General: Pleasant, obese female in NAD.   Psych:  Mood is \"good,\" affect is appropriate.  Thought form and content are fluid and coherent.    HEENT: Eyes and sclera are clear. Extraocular movements are grossly intact without proptosis.  Nares are patent, mucous membranes moist.  Neck: No masses or JVD are noted.    Resp: Easy and unlabored breathing.   Neuro: Alert and oriented, communicating clearly.   Ext: no swelling or edema.  Good distal pulses and capillary refill in digits.  Skin in good condition.  Sensation is intact to monofilament testing bilaterally and throughout.    Data:      Recent Labs   Lab Test 06/15/22  1531 10/20/21  1315 09/09/21  1103 07/23/21  1150 06/03/21  1047 08/19/20  1626 10/29/19  1710 07/24/19  0000 10/25/18  1052 10/11/18  1108 05/29/18  1146 02/08/18  0000 02/07/18  1225 03/15/17  0754 02/13/17  1304   A1C  --   --   --  >15.0*  --  >14.0*  --   --   --  8.4*   < >  --   --    < >  --    HEMOGLOBINA1  --   --   --   --   --   --   --  10.1*  --   --   --  8.9*  --   --   --    TSH  --   --  6.79*  --   --  17.34*  --   --   --  2.06  --   --  7.15*  --   --    T4  --   --   --   --   --   --   --   --   --  0.95  --   --  0.91  --   --    LDL 93  --   --  117*  --  116*  --   --   --  70  --   --   --   --   --    HDL 55  --   --  47*  --  47*  --   --   --  37*  --   --   --   --   --    TRIG 157*  --   --  141  --  216*  --   --   --  143  --   --   --   --   --    CR 0.96 1.06* 0.97 0.87   < > 1.00   < >  --    < >  --    < >  --  1.19*   < >  --    MICROL  --   --   --  1,020  --   --   --   --   --   --   --   --   --   --  <5    < > = values in this interval not displayed.       Most recent GFRs:    GFR Estimate   Date Value Ref Range Status   06/15/2022 73 >60 mL/min/1.73m2 Final     Comment:     Effective December 21, 2021 eGFRcr in adults is calculated " using the 2021 CKD-EPI creatinine equation which includes age and gender (Sarah et al., NEJ, DOI: 10.1056/WPCWti6718351)   10/20/2021 63 >60 mL/min/1.73m2 Final     Comment:     As of July 11, 2021, eGFR is calculated by the CKD-EPI creatinine equation, without race adjustment. eGFR can be influenced by muscle mass, exercise, and diet. The reported eGFR is an estimation only and is only applicable if the renal function is stable.   09/09/2021 70 >60 mL/min/1.73m2 Final     Comment:     As of July 11, 2021, eGFR is calculated by the CKD-EPI creatinine equation, without race adjustment. eGFR can be influenced by muscle mass, exercise, and diet. The reported eGFR is an estimation only and is only applicable if the renal function is stable.   06/03/2021 77 >60 mL/min/[1.73_m2] Final     Comment:     Non  GFR Calc  Starting 12/18/2018, serum creatinine based estimated GFR (eGFR) will be   calculated using the Chronic Kidney Disease Epidemiology Collaboration   (CKD-EPI) equation.     08/19/2020 67 >60 mL/min/[1.73_m2] Final     Comment:     Non  GFR Calc  Starting 12/18/2018, serum creatinine based estimated GFR (eGFR) will be   calculated using the Chronic Kidney Disease Epidemiology Collaboration   (CKD-EPI) equation.     10/29/2019 52 (L) >60 mL/min/[1.73_m2] Final     Comment:     Non  GFR Calc  Starting 12/18/2018, serum creatinine based estimated GFR (eGFR) will be   calculated using the Chronic Kidney Disease Epidemiology Collaboration   (CKD-EPI) equation.       GFR Estimate If Black   Date Value Ref Range Status   06/03/2021 89 >60 mL/min/[1.73_m2] Final     Comment:      GFR Calc  Starting 12/18/2018, serum creatinine based estimated GFR (eGFR) will be   calculated using the Chronic Kidney Disease Epidemiology Collaboration   (CKD-EPI) equation.     08/19/2020 78 >60 mL/min/[1.73_m2] Final     Comment:      GFR Calc  Starting  12/18/2018, serum creatinine based estimated GFR (eGFR) will be   calculated using the Chronic Kidney Disease Epidemiology Collaboration   (CKD-EPI) equation.     10/29/2019 60 (L) >60 mL/min/[1.73_m2] Final     Comment:      GFR Calc  Starting 12/18/2018, serum creatinine based estimated GFR (eGFR) will be   calculated using the Chronic Kidney Disease Epidemiology Collaboration   (CKD-EPI) equation.             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    1.  Diabetes: We discussed transitioning to insulin U500.  Pricilla reports that this is not the first time that interested in trying it as it will be less injections for her.  It appears that she currently is taking nearly 700 units once daily, not entirely clear, so we will start with 500 units daily to be given 200 units 30 to 60 minutes before breakfast and 150 units 30 to 60 minutes lunch and dinner.  I am advising her to call or MyChart us if blood sugar is greater than times in a row or less than 70 at any point.  I want her to see diabetes education as soon as possible to get the rfoy to started and have alarms for 70 and 350.  She also needs education regarding checking ketones.  She we may need to add a Humalog or NovoLog sliding scale, but initially we will try to adjust her 500 dosing.  We also want to resume use of the froy 2 CGM S today, but advise regardless I want her checking her blood sugar prior to each meal and at bedtime so that we have good data to adjust her blood sugar.   Hoping she can be seen back in clinic in 2-3 weeks.      2. DM Complications-     Lifestyle modification focusing on application of a Mediterranean diet or Dietary Approaches to Stop Hypertension (DASH) dietary pattern; reduction of saturated fat and trans fat; increase of dietary n-3 fatty acids, viscous fiber, and plant stanols/sterols intake; and increased physical activity recommended to improve the lipid profile and reduce the risk of developing atherosclerotic cardiovascular disease.     Retinopathy:   None known she is seeing ophthalmology.  Nephropathy: Microalbuminuria >1000 last year - will recheck this value today and refer to nephrology unless resolved.   Neuropathy: Yes we will increase her gabapentin dose to 600 mg 3 times daily, and monitor for side effects again return to clinic in 2 to 4 weeks  Feet: OK, no ulcers or lesions.   Lipids:   10-year atherosclerotic cardiovascular disease risk >20%.  Patient taking a statin.      40 minutes spent on the date of the encounter doing chart review, history and exam, documentation, education and counseling, as well as communication and coordination of care, and further activities as noted above.  This time excludes time spent reviewing CGM.      It is my privilege to be involved in the care of the above patient.     Karen Velasquez PA-C, MPAS  [facility]  Diabetes, Endocrinology, and Metabolism  [phone] Appointments/Nurse  [phone] pager  [phone] nurse line    This note was completed in part using Dragon voice recognition, and may contain word and grammatical errors.

## 2022-06-17 NOTE — TELEPHONE ENCOUNTER
BUMETANIDE 1MG TABS   Disp Refills Start End ROJELIO   bumetanide (BUMEX) 1 MG tablet 300 tablet 1 6/15/2022  No   Sig - Route: Take 5 tablets (5 mg) by mouth 2 times daily For additional refills, please schedule a follow-up appointment at [phone] with Dr. Alcala or Kristen Cuello NP - Oral   Sent to pharmacy as: Bumetanide 1 MG Oral Tablet (BUMEX)   Class: E-Prescribe   Order: [id number]   E-Prescribing Status: Receipt confirmed by pharmacy (6/15/2022  4:29 PM CDT)       Printout Tracking    External Result Report     Medication Administration Instructions    For additional refills, please schedule a follow-up appointment at [phone] with Dr. Alcala or Kristen Cuello NP     Pharmacy    [place], [place] - [address] [place] [address] [phone]

## 2022-06-21 NOTE — LETTER
6/21/2022       RE: Pricilla Brown  [address]  [facility] [address]     Dear Colleague,    Thank you for referring your patient, Pricilla Brown, to the [facility] [place] at [facility]. Please see a copy of my visit note below.                 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 She who is here for follow-up of diabetes.      Last seen in weight clinic 11/23/21:    At most recent visit taking Trulicity 3.0 mg weekly, Jardiance 25 mg, bupropion 100 mg bid, Toujeo 225 units in the morning, Novolog 100 units plus correction. She could not tolerate metformin.      She is no longer on topiramate. She felt it worked in the past but not anymore.     Interim:  Had eye surgery for cataract.        Today:  Reports  - 300s. Not higher.  Eats 9am, 1 pm and 5:30 pm.  Tries only light snacks at night.  Has not sure about her dosing, but feels rather confident her Toujeo dose is 190 morning.  And generally giving 170 units of NovoLog before each meal.  Sometimes they will use a sliding scale to start and give 100 units plus sliding scale, but this is generally around 270 units and frequently she and her PCA  just give that.    She has been losing some weight and having more energy with that.  She reports she started her gabapentin 600 units daily some years ago and initially it helps but it does not seem to be as helpful.  She is wondering if she could increase the dose.  She denies any fatigue with this.      Current Diabetes Medications:  Toujeo 190 daily  - couldn't  see.  Jardiance 25 mg - no yeast infections any more.    Ozempic 1mg weekly  Novolog 170 per meal.  100 plus siding scale.      We reviewed glucometer/CGMS data together.  It revealed:  She forgot to bring her glucometer.  She reports all values have been between 200 and 399.      Pricilla's PMH, PSH and allergies were reviewed today and pertinent information is summarized above.    Pricilla's pertinent social and family history are also reviewed today and pertinent information is summarized above.  Also noted is: She has a PCA    Current Outpatient Medications   Medication     ACCU-CHEK RON PLUS test strip     acetaminophen (TYLENOL) 325 MG tablet     albuterol (VENTOLIN HFA) 108 (90 Base) MCG/ACT inhaler     apixaban ANTICOAGULANT (ELIQUIS) 5 MG tablet     atorvastatin (LIPITOR) 40 MG tablet     bisacodyl (DULCOLAX) 10 MG suppository     blood glucose (NO BRAND SPECIFIED) lancets standard     blood glucose (NO BRAND SPECIFIED) test strip     blood glucose (NO BRAND SPECIFIED) test strip     blood glucose (NO BRAND SPECIFIED) test strip     blood glucose monitoring (ACCU-CHEK FASTCLIX) lancets     blood glucose monitoring (IBG STAR) meter device kit     blood glucose monitoring (NO BRAND SPECIFIED) meter device kit     blood glucose monitoring (NO BRAND SPECIFIED) meter device kit     bumetanide (BUMEX) 1 MG tablet     capsaicin (ZOSTRIX) 0.025 % external cream     ciclopirox (LOPROX) 0.77 % cream     ciclopirox (PENLAC) 8 % external solution     clotrimazole (LOTRIMIN) 1 % external cream     colchicine (COLCYRS) 0.6 MG tablet     colchicine (COLCYRS) 0.6 MG tablet     diclofenac (VOLTAREN) 1 % topical gel     Efinaconazole 10 % SOLN     gabapentin (NEURONTIN) 300 MG capsule     hydrALAZINE (APRESOLINE) 25 MG tablet     insulin aspart (NOVOLOG FLEXPEN) 100 UNIT/ML pen     insulin glargine U-300 (TOUJEO SOLOSTAR) 300 UNIT/ML (1 units dial) pen     insulin pen needle (BD RIVER U/F) 32G X 4 MM miscellaneous     isosorbide  dinitrate (ISORDIL) 20 MG tablet     JARDIANCE 25 MG TABS tablet     ketorolac (ACULAR) 0.5 % ophthalmic solution     levothyroxine (SYNTHROID/LEVOTHROID) 200 MCG tablet     metoprolol succinate ER (TOPROL-XL) 200 MG 24 hr tablet     nystatin (MYCOSTATIN) 741371 UNIT/GM external cream     order for DME     order for DME     order for DME     ORDER FOR DME     oxyCODONE-acetaminophen (PERCOCET) 5-325 MG per tablet     polyethylene glycol (MIRALAX/GLYCOLAX) powder     potassium chloride ER (KLOR-CON M) 20 MEQ CR tablet     prednisoLONE acetate (PRED FORTE) 1 % ophthalmic suspension     Respiratory Therapy Supplies (NEBULIZER COMPRESSOR) KIT     Semaglutide, 1 MG/DOSE, 4 MG/3ML SOPN     senna (SENOKOT) 8.6 MG tablet     spironolactone (ALDACTONE) 50 MG tablet     tiotropium (SPIRIVA HANDIHALER) 18 MCG inhalation capsule     topiramate (TOPAMAX) 25 MG tablet     ciclopirox (LOPROX) 0.77 % cream     ciprofloxacin (CIPRO) 500 MG tablet     Continuous Blood Gluc  (FREESTYLE BELLO 14 DAY READER) ADRIANO     Continuous Blood Gluc Sensor (FREESTYLE BELLO 14 DAY SENSOR) MISC     Continuous Blood Gluc Sensor (FREESTYLE BELLO 14 DAY SENSOR) MISC     DULoxetine (CYMBALTA) 20 MG capsule     methylPREDNISolone (MEDROL DOSEPAK) 4 MG tablet therapy pack     nicotine (NICODERM CQ) 14 MG/24HR 24 hr patch     No current facility-administered medications for this visit.     Facility-Administered Medications Ordered in Other Visits   Medication     sodium chloride (PF) 0.9% PF flush 10 mL         ROS:   Patient denies any fevers, chills or sweats as well as any changes in vision, problems with floaters or field cuts in vision, pain or problems with dentition, new or different headaches.  Patient denies symptoms of hypo and hyperglycemia except as above.   Patients denies marked fatigue, cough, shortness of breath, chest pain or pressure.  There has been no pain with or other changes in urination or  itching or pain in genital  "areas.  Patient denies any noted swelling in feet, ankles or otherwise, loss of sensation or pain  in feet or other areas.    Patient also denies current difficulties with depressed mood, anhedonia or worrying too much.        Exam:    BP (!) 153/99 (BP Location: Left arm, Patient Position: Sitting, Cuff Size: Adult Regular)   Pulse 87   Temp 99  F (37.2  C) (Oral)   Ht 1.676 m (5' 6\")   Wt (!) 191 kg (421 lb)   SpO2 94%   BMI 67.95 kg/m      General: Pleasant, obese female in NAD.   Psych:  Mood is \"good,\" affect is appropriate.  Thought form and content are fluid and coherent.    HEENT: Eyes and sclera are clear. Extraocular movements are grossly intact without proptosis.  Nares are patent, mucous membranes moist.  Neck: No masses or JVD are noted.    Resp: Easy and unlabored breathing.   Neuro: Alert and oriented, communicating clearly.   Ext: no swelling or edema.  Good distal pulses and capillary refill in digits.  Skin in good condition.  Sensation is intact to monofilament testing bilaterally and throughout.    Data:      Recent Labs   Lab Test 06/15/22  1531 10/20/21  1315 09/09/21  1103 07/23/21  1150 06/03/21  1047 08/19/20  1626 10/29/19  1710 07/24/19  0000 10/25/18  1052 10/11/18  1108 05/29/18  1146 02/08/18  0000 02/07/18  1225 03/15/17  0754 02/13/17  1304   A1C  --   --   --  >15.0*  --  >14.0*  --   --   --  8.4*   < >  --   --    < >  --    HEMOGLOBINA1  --   --   --   --   --   --   --  10.1*  --   --   --  8.9*  --   --   --    TSH  --   --  6.79*  --   --  17.34*  --   --   --  2.06  --   --  7.15*  --   --    T4  --   --   --   --   --   --   --   --   --  0.95  --   --  0.91  --   --    LDL 93  --   --  117*  --  116*  --   --   --  70  --   --   --   --   --    HDL 55  --   --  47*  --  47*  --   --   --  37*  --   --   --   --   --    TRIG 157*  --   --  141  --  216*  --   --   --  143  --   --   --   --   --    CR 0.96 1.06* 0.97 0.87   < > 1.00   < >  --    < >  --    < >  --  " 1.19*   < >  --    MICROL  --   --   --  1,020  --   --   --   --   --   --   --   --   --   --  <5    < > = values in this interval not displayed.       Most recent GFRs:    GFR Estimate   Date Value Ref Range Status   06/15/2022 73 >60 mL/min/1.73m2 Final     Comment:     Effective December 21, 2021 eGFRcr in adults is calculated using the 2021 CKD-EPI creatinine equation which includes age and gender (Sarah et al., NE, DOI: 10.1056/TOSIsn9419689)   10/20/2021 63 >60 mL/min/1.73m2 Final     Comment:     As of July 11, 2021, eGFR is calculated by the CKD-EPI creatinine equation, without race adjustment. eGFR can be influenced by muscle mass, exercise, and diet. The reported eGFR is an estimation only and is only applicable if the renal function is stable.   09/09/2021 70 >60 mL/min/1.73m2 Final     Comment:     As of July 11, 2021, eGFR is calculated by the CKD-EPI creatinine equation, without race adjustment. eGFR can be influenced by muscle mass, exercise, and diet. The reported eGFR is an estimation only and is only applicable if the renal function is stable.   06/03/2021 77 >60 mL/min/[1.73_m2] Final     Comment:     Non  GFR Calc  Starting 12/18/2018, serum creatinine based estimated GFR (eGFR) will be   calculated using the Chronic Kidney Disease Epidemiology Collaboration   (CKD-EPI) equation.     08/19/2020 67 >60 mL/min/[1.73_m2] Final     Comment:     Non  GFR Calc  Starting 12/18/2018, serum creatinine based estimated GFR (eGFR) will be   calculated using the Chronic Kidney Disease Epidemiology Collaboration   (CKD-EPI) equation.     10/29/2019 52 (L) >60 mL/min/[1.73_m2] Final     Comment:     Non  GFR Calc  Starting 12/18/2018, serum creatinine based estimated GFR (eGFR) will be   calculated using the Chronic Kidney Disease Epidemiology Collaboration   (CKD-EPI) equation.       GFR Estimate If Black   Date Value Ref Range Status   06/03/2021 89 >60  mL/min/[1.73_m2] Final     Comment:      GFR Calc  Starting 12/18/2018, serum creatinine based estimated GFR (eGFR) will be   calculated using the Chronic Kidney Disease Epidemiology Collaboration   (CKD-EPI) equation.     08/19/2020 78 >60 mL/min/[1.73_m2] Final     Comment:      GFR Calc  Starting 12/18/2018, serum creatinine based estimated GFR (eGFR) will be   calculated using the Chronic Kidney Disease Epidemiology Collaboration   (CKD-EPI) equation.     10/29/2019 60 (L) >60 mL/min/[1.73_m2] Final     Comment:      GFR Calc  Starting 12/18/2018, serum creatinine based estimated GFR (eGFR) will be   calculated using the Chronic Kidney Disease Epidemiology Collaboration   (CKD-EPI) equation.             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    1.  Diabetes: We discussed transitioning to insulin U500.  Pricilla reports that this is not the first time that interested in trying it as it will be less injections for her.  It appears that she currently is taking nearly 700 units once daily, not entirely clear, so we will start with 500 units daily to be given 200 units 30 to 60 minutes before breakfast and 150 units 30 to 60 minutes lunch and dinner.  I am advising her to call or MyChart us if blood sugar is greater than times in a row or less than 70 at any point.  I want her to see diabetes education as soon as possible to get the froy to started and have alarms for 70 and 350.  She also needs education regarding  checking ketones.  She we may need to add a Humalog or NovoLog sliding scale, but initially we will try to adjust her 500 dosing.  We also want to resume use of the froy 2 CGM S today, but advise regardless I want her checking her blood sugar prior to each meal and at bedtime so that we have good data to adjust her blood sugar.  Hoping she can be seen back in clinic in 2-3 weeks.      2. DM Complications-     Lifestyle modification focusing on application of a Mediterranean diet or Dietary Approaches to Stop Hypertension (DASH) dietary pattern; reduction of saturated fat and trans fat; increase of dietary n-3 fatty acids, viscous fiber, and plant stanols/sterols intake; and increased physical activity recommended to improve the lipid profile and reduce the risk of developing atherosclerotic cardiovascular disease.     Retinopathy:   None known she is seeing ophthalmology.  Nephropathy: Microalbuminuria >1000 last year - will recheck this value today and refer to nephrology unless resolved.   Neuropathy: Yes we will increase her gabapentin dose to 600 mg 3 times daily, and monitor for side effects again return to clinic in 2 to 4 weeks  Feet: OK, no ulcers or lesions.   Lipids:   10-year atherosclerotic cardiovascular disease risk >20%.  Patient taking a statin.      40 minutes spent on the date of the encounter doing chart review, history and exam, documentation, education and counseling, as well as communication and coordination of care, and further activities as noted above.  This time excludes time spent reviewing CGM.      It is my privilege to be involved in the care of the above patient.     Karen Velasquez PA-C, MPAS  [facility]  Diabetes, Endocrinology, and Metabolism  [phone] Appointments/Nurse  [phone] pager  [phone] nurse line    This note was completed in part using Dragon voice recognition, and may contain word and grammatical errors.

## 2022-06-21 NOTE — TELEPHONE ENCOUNTER
[facility] Call Center    Phone Message    May a detailed message be left on voicemail: yes     Reason for Call: Medication Question or concern regarding medication   Prescription Clarification  Name of Medication: Insulin & blood pressure machine    Prescribing Provider: Ron     Pharmacy: [facility] [place], [place] - [address] [place] [address] [phone]     What on the order needs clarification? Pt stated she was supposed to  medication downstairs, but script has not been sent yet.  Please send ASAP.           Action Taken: Message routed to:  [facility] endo    Travel Screening: Not Applicable

## 2022-06-21 NOTE — PATIENT INSTRUCTIONS
Dear Pricilla,  Let's start u 500 concentrated insulin.    Take 200 units 30 - 60 minutes before breakfast and 150 units before lunch and dinner.  To begin, please check blood sugar before each meal and at bedtime.   Please see myself, Dr Silva Goodrich or diabetes education in 4-6 weeks to adjust doses.      If blood sugar >350 two check in a row, call us or My Chart to discuss increasing dose.  Please give nurse or put in note your last 3 days of blood sugar data.      My best wishes,    Karen Velasquez PA-C, MPAS  [facility]  Diabetes, Endocrinology, and Metabolism  [phone] Appointments/Nurse  [phone] Fax  [phone] URGENTafter hours/weekend Endocrinologist on call

## 2022-06-21 NOTE — NURSING NOTE
"Chief Complaint   Patient presents with     Diabetes     Vital signs:  Temp: 99  F (37.2  C) Temp src: Oral BP: (!) 153/99 Pulse: 87     SpO2: 94 %     Height: 167.6 cm (5' 6\") Weight: (!) 191 kg (421 lb)  Estimated body mass index is 67.95 kg/m  as calculated from the following:    Height as of this encounter: 1.676 m (5' 6\").    Weight as of this encounter: 191 kg (421 lb).        "

## 2022-06-22 NOTE — TELEPHONE ENCOUNTER
[facility] Call Center    Phone Message    May a detailed message be left on voicemail: yes     Reason for Call: Medication Refill Request    Has the patient contacted the pharmacy for the refill? Yes   Name of medication being requested: Lift chair request. Is there a possibility to order bariatric bed?    Provider who prescribed the medication:Fredrick. Signed RX is needed by patient. Please send paper order to patient [address], [place] [address]. [facility]ax order to [facility] to [facility] picked up in [facility]. .     Dx:  obesity with diabilities and  heart failure         [facility]  Date medication is needed: asap  Per Osorio advice       Action Taken: Message routed to:  Other: routed  cardiology    Travel Screening: Not Applicable

## 2022-07-05 NOTE — PROGRESS NOTES
This result is concerning, I highly recommend that she see nephrology and I am placing a referral.  Microalbuminuria increased to 2337 mg/g Cr    It is my privilege to be involved in the care of the above patient.     Karen Velasquez PA-C, [facility]  [facility]  Diabetes, Endocrinology, and Metabolism  [phone] Appointments/Nurse  [phone] Fax  [phone] pager  [phone] nurse line

## 2022-07-11 NOTE — TELEPHONE ENCOUNTER
Spoke with patient to schedule left eye surgery with Dr. Das    Surgery was scheduled on 7/25 at [facility]  Patient will have H&P at [facility]     Patient is aware a COVID-19 test is needed before their procedure. The test should be with-in 4 days of their procedure.   Test Details: Date 7/21 Location [facility]    Post-Op visit was scheduled on 7/26 AND 8/4  Patient was advised a / is needed day of surgery. As well as, for 24 hours after their surgery procedure.  Surgery packet was mailed 7/11, patient has my direct contact information for any further questions [phone].

## 2022-07-22 NOTE — TELEPHONE ENCOUNTER
"Diabetes Educator Note:     Received Pricilla's message to the call center regarding hypoglycemia overnight.   She is using the Jessica 2 sensor connected to her phone, however she is not currently linked with our clinic, so I'm unable to see her data.    She states that for the past 4-5 days, she has been having overnight hypoglycemia, with sensor alerting her and having symptoms (sweaty and shaky).  States that the sensor is reading in the low 60's and 50's.  Last night she states that the sensor alerted her to a glucose of 51 mg/dL.    States \"I'm not having this!\"  States she has been losing sleep because of these prolonged episodes of hypoglycemia.     Explained that the problem is not the type of insulin she is taking (started U-500 about a month ago), but rather the dosing.      Currently taking 200 units 30 minutes before breakfast and 150 units 30 minutes before lunch and dinner.   Asked her to describe any recent changes in her activity or eating habits.  She states that she has been trying to control portions to facilitate weight loss.   States that she is not having hypoglycemia at any other time of day.         Advised her, for the time being, to reduce her U-500 dose at supper (which she typically eats between 6-7pm) to 100 units (down from 150).    Am asking the resource CDE next week to check in with her to see how she's doing with this.      I assisted her with inputting the clinic code into her Jessica valeriy, so we can see her sensor data.    She verbalized understanding of the plan and agreed.  This visit was quite brief as she had another appointment to get to and was in a time crunch.      Note to resource CDE Nelson Velasquez PA-C.     Kalani Lara, ARIESN, RN, [facility]  Certified Diabetes Care and   [facility] Endocrinology and Diabetes  [facility]  Clinic [phone]  Phone [phone]       "

## 2022-07-22 NOTE — TELEPHONE ENCOUNTER
[facility] Call Center    Phone Message    May a detailed message be left on voicemail: yes     Reason for Call: Other: Per Patient needs discuss getting new  Insulin right away she does not want to take the   insulin  RX  HIGH CONC (HUMULIN R U-500 KWIKPEN) 500 UNIT/ML PEN soln  And has stopped.     Per patient it is dropping her BS level down to low at night her device is constantly going off she not get rest and wakes up feeling awful.     Per patient urgent call back needed to discuss other insulin options .     Action Taken: Other: ENDO    Travel Screening: Not Applicable

## 2022-07-22 NOTE — TELEPHONE ENCOUNTER
Wants to get off U500 but maybe just needs  Dose adjustment. I sent her a my chart message to schedule a follow up . Could you try to reahc her to see if changes need to be made until appointment  ? Poor compliance history .Sarina Haji RN on 7/22/2022 at 10:09 AM

## 2022-07-25 NOTE — TELEPHONE ENCOUNTER
I called Pricilla this morning but she told me she was getting out of an outpatient surgery and asked that I call back in the afternoon. Attempted her again this afternoon but received voicemail. I left her a message to call us back when she can talk. She knows we are following up on changes that were made Friday.    Thanks,  Jamilah

## 2022-07-26 NOTE — NURSING NOTE
Chief Complaints and History of Present Illnesses   Patient presents with     Follow Up For Surgery Of Eye     Chief Complaint(s) and History of Present Illness(es)     Follow Up For Surgery Of Eye     Laterality: left eye    Associated symptoms: dryness.  Negative for eye pain, flashes and floaters    Pain scale: 2/10              Comments     Patient states vision has improved left eye. Patient states a small amount of pain when closing tightly or with insertion of drops each eye. Patient states she is very pleased. Patient states some dryness each eye.     Karmen Manuel, VALERIANO July 26, 2022 12:33 PM

## 2022-07-26 NOTE — PROGRESS NOTES
Post op day #1, status post cataract surgery, left eye 7/25/22  Post op  status post cataract surgery, right eye  6/09/22    HPI: Patient states vision is much better. Patient states colors are brighter. Mild itching right eye >left eye.  No pain.    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  Wounds water-tight and IOP normal, mild hyperemia, symmetric each eye, mild itching both eyes but tolerable  Patient has history of hives/rash to ibuprofen, may need to discontinue ketorolac early, though would like to keep if able for cystoid macular edema prevention with her diabetes mellitus.     Plan:  left eye:  Prednisolone (white cap, milky drop) 4 times daily  (twice a day right eye)  Ketorolac (gray cap) 4 times daily  (twice a day right eye)  Patient received intracameral Moxifloxacin in surgery      Instructions for patient reviewed:  Eye protection at all times and eye shield at night for 1 week.  Limited activities with no exercise or heavy lifting for 1 week.  Reviewed high importance of using prolonged drop schedule as discussed to help reduce risk of cystoid macular edema   Instructed patient to contact immediately for decreasing vision, eye pain, increased redness, new floaters or flashes of light, or other concerning symptoms.  Followup:  Return in about 1 week (around 8/2/2022) for post-op cataract sx, POW  1 OS, POW 5 OD.        Complete documentation of historical and exam elements from today's encounter can be found in the full encounter summary report (not reduplicated in this progress note). I personally obtained the chief complaint(s) and history of present illness.  I have confirmed and edited as necessary the CC, HPI, PMH/PSH, social history, FMH, ROS, and exam/neuro findings as obtained by the technician  or others. I have examined this patient myself and I personally viewed the image(s) and studies listed above and the documentation reflects my findings and interpretation.  I formulated and edited as necessary the assessment and plan and discussed the findings and management plan with the patient and family.     Radha Das MD

## 2022-07-26 NOTE — TELEPHONE ENCOUNTER
Dr Wright Pt. Multiple No Shows for Dr Ascencio, Isela Archibald and Karen Velasquez.   Pt did have visit 06/21/2022 with Karen Velasquez. Dosages per notes.   RTC in place 09/2022. Pt notified to keep upcoming visit for future refills.   90 day supply Orders pended to on-call.  Linn Kelly RN on 7/26/2022 at 12:51 PM       RE    Name of Medication: Humalin     Prescribing Provider: Ron                 Pharmacy: [address] [phone]              What on the order needs clarification? Pt stated she is discontinuing Humalin due to having ups and downs in blood sugar.       Pt would like to be set back up on toujeo and Novolog ASAP.  Please call back.

## 2022-07-26 NOTE — TELEPHONE ENCOUNTER
[facility] Call Center    Phone Message    May a detailed message be left on voicemail: yes     Reason for Call: Medication Question or concern regarding medication   Prescription Clarification  Name of Medication: Humalin    Prescribing Provider: Ron     Pharmacy: [place], [place] - [address] [place] [address] [phone]   What on the order needs clarification? Pt stated she is discontinuing Humalin due to having ups and downs in blood sugar.      Pt would like to be set back up on toujeo and Novolog ASAP.  Please call back.           Action Taken: Message routed to:  Other: endo    Travel Screening: Not Applicable

## 2022-07-28 NOTE — TELEPHONE ENCOUNTER
blood glucose (NO BRAND SPECIFIED) test strip    Last Written Prescription Date:   7/23/2021  Last Fill Quantity: 400,   # refills: 3  Last Office Visit : No show  Future Office visit:   7/28/2022    Routing refill request to provider for review/approval because:  Former Dr. Wright Pt  Several No shows  Refer to Advice Provider for review         Ashley Haynes RN  Central Triage Red Flags/Med Refills

## 2022-07-29 NOTE — TELEPHONE ENCOUNTER
Appointment scheduled for an hour appointment with CDE on Wednesday. Monday not available. Forwarding message to nurses regarding Jessica and BG levels. d

## 2022-08-18 NOTE — TELEPHONE ENCOUNTER
LVM for patient regarding scheduled POST-OP for 9/19/22 at 8:40am with . Patient had missed her last POST-OP and needs to be seen. Scheduled patient for the soonest available appointment and sent Reminder Letter to address in Chart. Also sent patient a MyChart Message of scheduled appointment.

## 2022-08-23 NOTE — TELEPHONE ENCOUNTER
insulin reg HIGH CONC (HUMULIN R U-500 KWIKPEN) 500 UNIT/ML PEN soln        Last Written Prescription Date:  6/21/22  Last Fill Quantity: 60 ml,   # refills: 1  Last Office Visit : 7/12/22  Future Office visit:  9/13/22    Routing refill request to provider for review/approval because:  Insulin - refilled per clinic

## 2022-09-13 NOTE — TELEPHONE ENCOUNTER
PRESCRIPTIONS MUST BE WRITTEN THIS WAY TO MAKE THEM MEDICARE COMPLIANT.    Freestyle Jessica 2 Sensors  SIG:  Change q 14 days  QTY:  2  Refills:  5    -Prescription must be written after the clinical note date and will only be able to be used for 6 months from the date of the clinical notes. (We will be requesting new clinical notes and prescriptions every 6 months to meet Medicare Guidelines.)    All Documents (including clinical notes) MUST be signed by the same doctor.    Please contact us at [phone] (this number is for clinics only) with any questions. Patients may call us at [phone].      Thank you,  [facility]

## 2022-10-05 NOTE — TELEPHONE ENCOUNTER
[facility] Call Center    Phone Message    May a detailed message be left on voicemail: yes     Reason for Call: Other:     Pt is requesting a call back to discuss medication given while in Hospital.  She stated she was told to take Motoprolol 50 mg but was not given prescription.  Lisa call back to discuss next steps.    Action Taken: Other: cardio    Travel Screening: Not Applicable

## 2022-10-12 NOTE — TELEPHONE ENCOUNTER
10.12.22  1:39  Pt called, please send the script to [address] [phone].    Lad               [facility] Call Center    Phone Message    May a detailed message be left on voicemail: yes     Reason for Call: Medication Question or concern regarding medication   Prescription Clarification  Name of Medication: Metoprolol 50 mg  Prescribing Provider: Requesting from Dr. Alcala   Pharmacy:  [address] [phone]   What on the order needs clarification? Patient calling back to follow up on her message from 10/5. She says that during a recent hospital stay, they gave her 50 mg of metoprolol and she needs a prescription for it from her cardiologist. She says that her heart rate is a little high and blood pressure a little low (not red flag level) and that she thinks she went into a-fib last night. She really would like this prescription soon as she has been out of this medication since October 3rd and she feels it would help her current symptoms. Please call her back to discuss.    Thank you!  [facility]        Action Taken: Other: Cardiology    Travel Screening: Not Applicable

## 2022-10-12 NOTE — TELEPHONE ENCOUNTER
"Was able to connect with Pricilla via phone.  Per our [facility] records Pricilla is still on Toprol 200mg daily. She tells me she's been off that \"since a ways back\" when she had a hospitalization at [facility] and her BPs were low, she tells me they DC'ed her Toprol.  Pricilla explains she had an ED visit at [facility] in early October and she was told to start back on Toprol 50mg but they did not give her a script. Pricilla states her HRs have been high, sometimes 130's - she says she thinks she been having some A.fib.  She does confirm she's still taking her Eliquis and I remind her how important that it.  She does have a BP cuff at home and her BPs have been 120-130's/80's.   Unfortunately we cannot CareEverywhere her current [facility] records (because we are not in a Visit Encounter), but I can see her [facility] records from 07/2022 and metoprolol is not in her list. I explained to Pricilla that Dr. Alcala is out of the office, but I will discuss with one of his colleagues and call her back. Discussed with Dr. Su and called Pricilla back. Pricilla verbalizes understanding and agrees with plan of care. Ophelia Kenny RN      Date: 10/12/2022  Time of Call: 12:29 PM  Diagnosis:  HF, Afib  VORB - Ordering provider: Dr. Regina Su  Order: Restart Toprol 50mg daily.  Follow-up with Dr. Alcala as scheduled   Order received by: Ophelia Kenny RN           "

## 2022-10-15 NOTE — TELEPHONE ENCOUNTER
Pt called complaining of retention of water, shortness of breath, atrial fibrillation and chest pain that occurred today morning but then resolved. Currently feeling asymptomat but noted that similar episode happened a few days ago. Recommended that patient come in to the ER for further work up. Pt verbalized understanding.    Mani Scanlon MD  Cardiology Fellow

## 2022-10-25 NOTE — PROGRESS NOTES
Medical Director   [facility]    [address] [place] [address]        Kristen Cuello NP   [facility]   [address], [address] [place] [address]       Blay[facility] Brunswick   Coagulation Clinic       NANETTE Chin   [facility] Endocrinology    [address] [facility] [address]   [place] [address]      Jamilah Bernal MD   [facility] Internal Medicine   [address] [place] [address]        Impression:  1. Obesity  2. Diabetes  3. Smoking  4. Elevated body index  5. Hypothyroidism  6. Atrial fibrillation paroxysmal  7. Proteinuria    Plan :  1. Add hydrochlothiazide 12.5mgs once  twice daily  2. See in [facility] on Monday with CMP, magnesium        The patient returns for follow-up of heart failure.  There is no interim history of chest pain  The patient has gained almost 30 pounds since discharge from hospital 3 weeks ago.  She is now sleep in chair.  She mass massive dependent edema, increasing shortness of breath  Constitutional: alert, oriented, abnormal gait and station, normal mentation.  Oral: moist mucous membranes  Lymph: without pathologic adenopathy  Chest:r basilar rales and dullness  Cor: No evidence of left or right ventricular activity.  Rhythm is regular.  S1 normal, S2 split physiologically. Murmurs TR,   Abdomen marked enlargement with ascites  Extremities: lymphedema 4 + edema  Neuro: no focal defects, normal mentation  Skin: without open lesions chronic lymphedema  Psych: oriented, verbal, mental status in tact       Latest Reference Range & Units 06/14/22 14:41 06/15/22 15:31 06/21/22 10:23 06/21/22 12:32   Carbon Dioxide 20 - 32 mmol/L  29     Urea Nitrogen 7 - 30 mg/dL  17     Creatinine 0.52 - 1.04 mg/dL  0.96     GFR Estimate >60 mL/min/1.73m2  73     Calcium 8.5 - 10.1 mg/dL  9.3     Anion Gap 3 - 14 mmol/L  3     Albumin 3.4 - 5.0 g/dL  2.8 (L)     Protein Total 6.8 - 8.8 g/dL  7.8     Alkaline Phosphatase 40 - 150 U/L  84     ALT 0 -  50 U/L  51 (H)     AST 0 - 45 U/L  17     Albumin Urine mg/g Cr 0.00 - 25.00 mg/g Cr    2,336.96 (H)   Albumin Urine mg/L mg/L    2,150   Bilirubin Total 0.2 - 1.3 mg/dL  0.5     Cholesterol <200 mg/dL  179     Creatinine Urine mg/dL    92   Patient Fasting > 8hrs?   No     HDL Cholesterol >=50 mg/dL  55     Hemoglobin A1C POCT 4.3 - <5.7 %   12.3    LDL Cholesterol Calculated <=100 mg/dL  93     Non HDL Cholesterol <130 mg/dL  124     N-Terminal Pro Bnp 0 - 450 pg/mL  1,328 (H)     Triglycerides <150 mg/dL  157 (H)     Glucose 70 - 99 mg/dL  163 (H)     WBC 4.0 - 11.0 10e3/uL  10.2     Hemoglobin 11.7 - 15.7 g/dL  16.0 (H)     Hematocrit 35.0 - 47.0 %  48.5 (H)     Platelet Count 150 - 450 10e3/uL  261     RBC Count 3.80 - 5.20 10e6/uL  5.32 (H)     MCV 78 - 100 fL  91     MCH 26.5 - 33.0 pg  30.1     MCHC 31.5 - 36.5 g/dL  33.0     RDW 10.0 - 15.0 %  13.2     ECHOCARDIOGRAM COMPLETE  Rpt        Wt Readings from Last 24 Encounters:   10/26/22 (!) 208.6 kg (459 lb 14.4 oz)   06/21/22 (!) 191 kg (421 lb)   06/15/22 (!) 189.8 kg (418 lb 8 oz)   04/05/22 (!) 193.2 kg (426 lb)   11/23/21 (!) 193.2 kg (426 lb)   10/20/21 (!) 193.2 kg (426 lb)   09/09/21 (!) 196.2 kg (432 lb 8 oz)   07/23/21 (!) 198.1 kg (436 lb 12.8 oz)   09/28/20 (!) 222.3 kg (490 lb)   06/19/20 (!) 222.3 kg (490 lb)   10/29/19 (!) 226.7 kg (499 lb 11.2 oz)   07/24/19 (!) 223.9 kg (493 lb 9.6 oz)   07/23/19 (!) 229.5 kg (506 lb)   06/27/19 (!) 225.9 kg (498 lb)   03/13/19 (!) 227.2 kg (500 lb 14.4 oz)   03/13/19 (!) 227.3 kg (501 lb)   02/19/19 (!) 229.5 kg (506 lb)   01/07/19 (!) 224.6 kg (495 lb 1.6 oz)   11/30/18 (!) 220.1 kg (485 lb 4.8 oz)   11/29/18 (!) 220 kg (485 lb)   10/30/18 (!) 215.1 kg (474 lb 1.6 oz)   10/25/18 (!) 211.4 kg (466 lb)   10/11/18 (!) 211.5 kg (466 lb 4.8 oz)   10/09/18 (!) 207 kg (456 lb 4.8 oz)       Constitutional:e weight gain ,but no fever, chills, night sweats  HEENT: without visual changes, swallow difficulties recent  cataract  Pulmonary shortness of breath, cough, wheeze, hemoptysis  Cardiac: without chest pain,  buty has MURRAY, PND, orthopnea, edema, palpitation, pre-syncope, syncope,  GI: without diarrhea, constipation, jaundice, melena, GERD, hematemesis  : without frequency, urgency, dysuria, hematuria  Skin: rash, bruise, open lesions  Neuro: without TIA, focal neurologic complaints, seizure, trauma  Ortho: without pain but , swelling, mobility impairment  Endocrine: diabetes, thyroid, heat/cold intolerance, polyuria, polyphagia, change bowel habits.  Sleep:no JYOTI, periodic breathing, fatigue          MRN: [id number]  : 1975  Study Date: 2022 02:10 PM  Age: 47 yrs  Gender: Female  Patient Location: [facility]  Reason For Study: Acute on chronic heart failure with preserved ejection  fraction  Ordering Physician: CHERYL PAINTING  Referring Physician: CHERYL PAINTING  Performed By: Eufemia Bob RUST     BSA: 2.8 m2  Height: 66 in  Weight: 426 lb  ______________________________________________________________________________  Procedure  Echocardiogram with two-dimensional, color and spectral Doppler performed.  Contrast Optison. Technically difficult study.Extremely poor acoustic windows.  Limited information was obtained during study. Optison (NDC #3411-5815-91)  given intravenously. Patient was given 7 ml mixture of 3 ml Optison and 6 ml  saline. 3 ml wasted. IV start location RAC .  ______________________________________________________________________________  Interpretation Summary     Technically difficult study.Extremely poor acoustic windows.  Limited information was obtained during study.  Left ventricular size is normal.  The visual ejection fraction is 40-45%.  Right ventricular function cannot be assessed due to poor image quality.  ______________________________________________________________________________  Left Ventricle  Left ventricular size is normal. The visual ejection fraction is  40-45%.     Right Ventricle  Right ventricular function cannot be assessed due to poor image quality.     Atria  The atria cannot be assessed.     Mitral Valve  The mitral valve cannot be assessed.     Aortic Valve  The aortic valve cannot be assessed.     Tricuspid Valve  The tricuspid valve cannot be assessed.     Pulmonic Valve  The pulmonic valve cannot be assessed.     Vessels  The thoracic aorta cannot be assessed. The pulmonary artery cannot be  assessed. The inferior vena cava is normal.     Pericardium  No pericardial effusion is present.

## 2022-10-26 NOTE — NURSING NOTE
Chief Complaint   Patient presents with     Follow Up     Return Heart Failure: 47yr old female presents with HFpEF for follow up and to re-establish care with labs prior     Vitals were taken and medications reconciled.    SEBASTIAN Ruiz  3:28 PM

## 2022-10-26 NOTE — TELEPHONE ENCOUNTER
[facility] Call Center    Phone Message    May a detailed message be left on voicemail: yes     Reason for Call: Medication Refill Request    Has the patient contacted the pharmacy for the refill? Yes   Name of medication being requested: OZEMPIC, 1 MG/DOSE, 4 MG/3ML SOPN [Pharmacy Med Name: OZEMPIC (1 MG/DOSE) 4MG/3ML     Provider who prescribed the medication: Dr. Ascencio  Pharmacy: [facility] [place]  Date medication is needed: 10/26/22

## 2022-10-26 NOTE — PATIENT INSTRUCTIONS
Medication Changes:  Hydrochlorothiazide 12.5 twice daily    Patient Instructions:  -Continue staying active and eat a heart healthy diet.  -Please keep a current list of medications with you at all times.      Follow up Appointment Information:  [facility] follow up on Monday  2 week follow up with Percy Alcala MD      We are located on the third floor of the [facility] on the [facility].  Our address is     [address] [place] [address] [place] [address] [place] [address]    Aj Kenny RN  [facility]  Cardiology Care Coordinator-Heart Failure    Thank you for allowing us to be a part of your care here at the [facility]    If you have questions or concerns please contact us at:    Appointment scheduling or nurse questions: [phone]    On Call Cardiologist for after hours or on weekends: [phone]    option #4     Declines

## 2022-10-26 NOTE — LETTER
10/26/2022      RE: Pricilla Brown  [address]  [facility] [address]       Dear Colleague,    Thank you for the opportunity to participate in the care of your patient, Pricilla Brown, at the [facility] [place] [facility]. Please see a copy of my visit note below.      Medical Director   [facility]    [address] [place] [address]        Kristen Cuello NP   [facility]   [address], [address] [place] [address]       [facility]   Coagulation Clinic       NANETTE Chin   [facility] Endocrinology    [address] [facility] [address]   [place] [address]      Jamilah Bernal MD   [facility] Internal Medicine   [address] [id number]        Impression:  1. Obesity  2. Diabetes  3. Smoking  4. Elevated body index  5. Hypothyroidism  6. Atrial fibrillation paroxysmal  7. Proteinuria    Plan :  1. Add hydrochlothiazide 12.5mgs once  twice daily  2. See in [facility] on Monday with CMP, magnesium        The patient returns for follow-up of heart failure.  There is no interim history of chest pain  The patient has gained almost 30 pounds since discharge from hospital 3 weeks ago.  She is now sleep in chair.  She mass massive dependent edema, increasing shortness of breath  Constitutional: alert, oriented, abnormal gait and station, normal mentation.  Oral: moist mucous membranes  Lymph: without pathologic adenopathy  Chest:r basilar rales and dullness  Cor: No evidence of left or right ventricular activity.  Rhythm is regular.  S1 normal, S2 split physiologically. Murmurs TR,   Abdomen marked enlargement with ascites  Extremities: lymphedema 4 + edema  Neuro: no focal defects, normal mentation  Skin: without open lesions chronic lymphedema  Psych: oriented, verbal, mental status in tact       Latest Reference Range & Units 06/14/22 14:41 06/15/22 15:31 06/21/22 10:23  06/21/22 12:32   Carbon Dioxide 20 - 32 mmol/L  29     Urea Nitrogen 7 - 30 mg/dL  17     Creatinine 0.52 - 1.04 mg/dL  0.96     GFR Estimate >60 mL/min/1.73m2  73     Calcium 8.5 - 10.1 mg/dL  9.3     Anion Gap 3 - 14 mmol/L  3     Albumin 3.4 - 5.0 g/dL  2.8 (L)     Protein Total 6.8 - 8.8 g/dL  7.8     Alkaline Phosphatase 40 - 150 U/L  84     ALT 0 - 50 U/L  51 (H)     AST 0 - 45 U/L  17     Albumin Urine mg/g Cr 0.00 - 25.00 mg/g Cr    2,336.96 (H)   Albumin Urine mg/L mg/L    2,150   Bilirubin Total 0.2 - 1.3 mg/dL  0.5     Cholesterol <200 mg/dL  179     Creatinine Urine mg/dL    92   Patient Fasting > 8hrs?   No     HDL Cholesterol >=50 mg/dL  55     Hemoglobin A1C POCT 4.3 - <5.7 %   12.3    LDL Cholesterol Calculated <=100 mg/dL  93     Non HDL Cholesterol <130 mg/dL  124     N-Terminal Pro Bnp 0 - 450 pg/mL  1,328 (H)     Triglycerides <150 mg/dL  157 (H)     Glucose 70 - 99 mg/dL  163 (H)     WBC 4.0 - 11.0 10e3/uL  10.2     Hemoglobin 11.7 - 15.7 g/dL  16.0 (H)     Hematocrit 35.0 - 47.0 %  48.5 (H)     Platelet Count 150 - 450 10e3/uL  261     RBC Count 3.80 - 5.20 10e6/uL  5.32 (H)     MCV 78 - 100 fL  91     MCH 26.5 - 33.0 pg  30.1     MCHC 31.5 - 36.5 g/dL  33.0     RDW 10.0 - 15.0 %  13.2     ECHOCARDIOGRAM COMPLETE  Rpt        Wt Readings from Last 24 Encounters:   10/26/22 (!) 208.6 kg (459 lb 14.4 oz)   06/21/22 (!) 191 kg (421 lb)   06/15/22 (!) 189.8 kg (418 lb 8 oz)   04/05/22 (!) 193.2 kg (426 lb)   11/23/21 (!) 193.2 kg (426 lb)   10/20/21 (!) 193.2 kg (426 lb)   09/09/21 (!) 196.2 kg (432 lb 8 oz)   07/23/21 (!) 198.1 kg (436 lb 12.8 oz)   09/28/20 (!) 222.3 kg (490 lb)   06/19/20 (!) 222.3 kg (490 lb)   10/29/19 (!) 226.7 kg (499 lb 11.2 oz)   07/24/19 (!) 223.9 kg (493 lb 9.6 oz)   07/23/19 (!) 229.5 kg (506 lb)   06/27/19 (!) 225.9 kg (498 lb)   03/13/19 (!) 227.2 kg (500 lb 14.4 oz)   03/13/19 (!) 227.3 kg (501 lb)   02/19/19 (!) 229.5 kg (506 lb)   01/07/19 (!) 224.6 kg (495 lb 1.6  oz)   18 (!) 220.1 kg (485 lb 4.8 oz)   18 (!) 220 kg (485 lb)   10/30/18 (!) 215.1 kg (474 lb 1.6 oz)   10/25/18 (!) 211.4 kg (466 lb)   10/11/18 (!) 211.5 kg (466 lb 4.8 oz)   10/09/18 (!) 207 kg (456 lb 4.8 oz)       Constitutional:e weight gain ,but no fever, chills, night sweats  HEENT: without visual changes, swallow difficulties recent cataract  Pulmonary shortness of breath, cough, wheeze, hemoptysis  Cardiac: without chest pain,  buty has MURRAY, PND, orthopnea, edema, palpitation, pre-syncope, syncope,  GI: without diarrhea, constipation, jaundice, melena, GERD, hematemesis  : without frequency, urgency, dysuria, hematuria  Skin: rash, bruise, open lesions  Neuro: without TIA, focal neurologic complaints, seizure, trauma  Ortho: without pain but , swelling, mobility impairment  Endocrine: diabetes, thyroid, heat/cold intolerance, polyuria, polyphagia, change bowel habits.  Sleep:no JYOTI, periodic breathing, fatigue          MRN: [id number]  : 1975  Study Date: 2022 02:10 PM  Age: 47 yrs  Gender: Female  Patient Location: [facility]  Reason For Study: Acute on chronic heart failure with preserved ejection  fraction  Ordering Physician: CHERYL PAINTING  Referring Physician: CHERYL PAINTING  Performed By: Eufemia Bob RDCS     BSA: 2.8 m2  Height: 66 in  Weight: 426 lb  ______________________________________________________________________________  Procedure  Echocardiogram with two-dimensional, color and spectral Doppler performed.  Contrast Optison. Technically difficult study.Extremely poor acoustic windows.  Limited information was obtained during study. Optison (NDC #8456-7625-09)  given intravenously. Patient was given 7 ml mixture of 3 ml Optison and 6 ml  saline. 3 ml wasted. IV start location RAC .  ______________________________________________________________________________  Interpretation Summary     Technically difficult study.Extremely poor acoustic  windows.  Limited information was obtained during study.  Left ventricular size is normal.  The visual ejection fraction is 40-45%.  Right ventricular function cannot be assessed due to poor image quality.  ______________________________________________________________________________  Left Ventricle  Left ventricular size is normal. The visual ejection fraction is 40-45%.     Right Ventricle  Right ventricular function cannot be assessed due to poor image quality.     Atria  The atria cannot be assessed.     Mitral Valve  The mitral valve cannot be assessed.     Aortic Valve  The aortic valve cannot be assessed.     Tricuspid Valve  The tricuspid valve cannot be assessed.     Pulmonic Valve  The pulmonic valve cannot be assessed.     Vessels  The thoracic aorta cannot be assessed. The pulmonary artery cannot be  assessed. The inferior vena cava is normal.     Pericardium  No pericardial effusion is present.     Please do not hesitate to contact me if you have any questions/concerns.     Sincerely,     Percy Alcala MD

## 2022-10-26 NOTE — TELEPHONE ENCOUNTER
OZEMPIC (1 MG/DOSE) 4MG/3ML SOPN  Last Written Prescription Date:   11/23/2021  Last Fill Quantity: 9,   # refills: 3  Last Office Visit :   6/28/2022 No show  Future Office visit:   11/22/2022    Routing refill request to provider for review/approval because:  No shows for Weight management  Med refused  Needing updated office visit for Pt care  Scheduling notified       Ashley Haynes RN  Central Triage Red Flags/Med Refills

## 2022-10-30 NOTE — PROGRESS NOTES
[facility]    HPI:   Ms. Brown is a 47 year old female with medical history pertinent for morbid obesity, paroxysmal AF, DM II, HTN, NICM with an EF of 40-45%, and JYOTI who returns to [facility] for follow up.       With regards to her cardiac conditions, Ms. Brown has a longstanding history of heart failure. First diagnosed with diastolic dysfunction in 2013, and later systolic failure in 2017. Most recently, she was hospitalized at [facility] from 9/23/22-10/2/22 with acute hypoxic respiratory failure and sepsis of unclear etiology. CTA chest abdomen pelvis did not reveal any source of infection. Blood culture on admission growing E.coli and she was treated with IV rocephin for 9 days followed by 1 day of PO cefdinir for a 10 day course. Cardiology evaluated patient in consultation for possible NSTEMI. Echo showed severe global hypokinesis and severely reduced left ventricular systolic function with EF 20 to 25%. At discharge her Cr was 1.43 and she started on Bumex 4 mg BID.    Ms. Brown followed up with Dr. Alcala on 10/26/22. At that time she was volume up and reported MURRAY, PND, orthopnea, and LE edema. Continued to have uncontrolled hypertension. She was started on hydrochlorothiazide.     Today, Ms. Brown presents to [facility]. She reports feeling significantly better since last clinic visit. She is down 16 lbs. Since starting hydrochlorothiazide she notes increased UOP. Her MURRAY is much better, able to catcher her breath easier. She is no longer sleeping in her recliner chair and is able to sleep in bed with 2 pillows. Also notes reduced LE edema. Continues to have brief, intermittent episodes of palpitations, although she feels that this is also improved. Remains on metoprolol succinate 50 mg daily for rate control and eliquis for CVA prophylaxis. Denies any bleeding episodes. Denies chest pain, acute SOB, lightheadedness, syncope, or near syncope.      She occasionally will monitor her blood pressure at  home and tells me that her systolic blood pressures run 112-120s, although she is uncertain of the diastolic range. She does not monitor her weights at home, reports that her scale is broken. Trying to be mindful of her 2 L FR.     In review of her medications she confirms that she is taking eliquis and ASA. She has not been taking her statin, hydralazine, isordil, or spironolactone because she ran out. Stopped jardiance because she was worried about potential side effects, although denied having any issues while taking it. She was discharged from [facility] with orders for bumex 4 mg BID, however she tells me that she is taking 5 mg BID.      Cardiac Medications:   - Eliquis 5 mg BID  - Atorvastatin 40 mg daily   - Bumex 5 mg BID  - KCL 40 mEq QID  - Hydralazine 75 mg TID- not taking   - Hydrochlorothiazide 12.5 mg BID  - Isordil 20 mg TID- not taking   - Jardiance 25 mg daily- not taking d/t side effects    - Metoprolol succinate 50 mg daily  - Spironolactone 50 mg daily-not taking       PAST MEDICAL HISTORY:  Past Medical History:   Diagnosis Date     A-fib (H) 2011    on coumadin since 1/13     Asthma     as a kid     Chest pain 2/1/2017     Chronic anticoagulation for a-fib 2/15/2013    INR's followed by coumadin clinic at      Diabetes mellitus (H) 2012     Diastolic heart failure 2/15/2013     Dilated cardiomyopathy (H) 1/8/2013     HTN (hypertension)      Hyperthyroidism     Graves, s/p I131 1/13, now on prednisone and methimazole     Mixed type age-related cataract, both eyes 4/8/2022     Morbid obesity (H)      Pulmonary embolism (H) 1/12    hospitalized in [place], on lovenox/coumadin for a few months but stopped, hypercoag w/u neg per pt     Sleep apnea     using CPAP       FAMILY HISTORY:  Family History   Problem Relation Age of Onset     Thyroid Disease Mother         Grave's D     Diabetes Mother      Heart Disease Mother      Cerebrovascular Disease Mother         dec. 54 yo     Hypertension Mother      Heart  Disease Sister         Heart Murmur     Diabetes Sister      Heart Disease Father         dec in his 30s, MI     Psychotic Disorder Brother         Bipolar     Diabetes Brother      Thyroid Disease Brother         Hyper Thyroid     Heart Disease Brother      Thyroid Disease Sister         Hyper Thyroid     Thyroid Disease Sister         Hyper Thyroid     Thyroid Disease Sister         Mental Health Problems     Thyroid Disease Maternal Aunt      Thyroid Disease Maternal Uncle      Cancer No family hx of      Glaucoma No family hx of      Macular Degeneration No family hx of        SOCIAL HISTORY:  Social History     Socioeconomic History     Marital status: Single   Tobacco Use     Smoking status: Light Smoker     Packs/day: 0.25     Years: 13.00     Pack years: 3.25     Types: Cigarettes     Smokeless tobacco: Never     Tobacco comments:     down to 2 cigs a day   Substance and Sexual Activity     Alcohol use: No     Drug use: No     Sexual activity: Yes     Partners: Male     Birth control/protection: Condom   Social History Narrative    Single, no children        Gyn:        Last pap several years ago, no abnormal    No STIs            Patient is single.  She is no longer working.  She used to work in the area of customer service.  She is currently living with her sister in [place], [place].  She has no pets.  Patient has smoked a half pack of cigarettes a day for the past 10 plus years.  She states that she is down to 5 cigarettes a day with the aid of Wellbutrin.  She does not smoke cigars, pipes or chew tobacco.  She has 1 cup of coffee in the morning.  She does not drink alcohol.  Patient does not exercise.        CURRENT MEDICATIONS:  ACCU-CHEK RON PLUS test strip, USE TO TEST BLOOD SUGAR FOUR TIMES A DAY  OR AS DIRECTED.  acetaminophen (TYLENOL) 325 MG tablet, Take 2 tablets (650 mg) by mouth 3 times daily as needed for mild pain or fever (total acetaminophen dose should not exceed 3000 mg per  day)  albuterol (VENTOLIN HFA) 108 (90 Base) MCG/ACT inhaler, Inhale 2 puffs into the lungs every 6 hours as needed for shortness of breath / dyspnea  apixaban ANTICOAGULANT (ELIQUIS) 5 MG tablet, Take 1 tablet (5 mg) by mouth 2 times daily  atorvastatin (LIPITOR) 40 MG tablet, Take 1 tablet (40 mg) by mouth daily  bisacodyl (DULCOLAX) 10 MG suppository, Place 1 suppository (10 mg) rectally daily as needed for constipation  blood glucose (NO BRAND SPECIFIED) lancets standard, USE TO CHECK  blood sugar fasting each am  prelunch and predinner daily OR AS DIRECTED Call clinic to schedule MD APPT.  blood glucose (NO BRAND SPECIFIED) test strip, Use to test blood sugar 4 times daily or as directed. Accu check Sangeeta plus  blood glucose (NO BRAND SPECIFIED) test strip, Use to test blood sugar 4 times daily or as directed.  blood glucose (NO BRAND SPECIFIED) test strip, Use to test blood sugars 3 times daily or as directed  blood glucose monitoring (ACCU-CHEK FASTCLIX) lancets, Use to test blood sugar 4 times daily or as directed.or lancets that go with meter being used  blood glucose monitoring (IBG STAR) meter device kit, -PLEASE GIVE PATIENT A DEVICE HER INSURANCE WILL COVER-  blood glucose monitoring (NO BRAND SPECIFIED) meter device kit, Use to test blood sugar 4  times daily or as directed.  blood glucose monitoring (NO BRAND SPECIFIED) meter device kit, Use to test blood sugar 3 times daily or as directed.  Blood Pressure KIT, Use to check blood pressure as directed, once daily and as needed. Record results.  bumetanide (BUMEX) 1 MG tablet, Take 5 tablets (5 mg) by mouth 2 times daily For additional refills, please schedule a follow-up appointment at [phone] with Dr. Alcala or Kristen Cuello, ALEXANDER  capsaicin (ZOSTRIX) 0.025 % external cream, Apply 1 g topically 3 times daily as needed (use for neuropathy pain)  ciclopirox (LOPROX) 0.77 % cream, Apply topically 2 times daily To feet and toenails.  ciclopirox (PENLAC)  8 % external solution, Apply topically daily To toenails.  clotrimazole (LOTRIMIN) 1 % external cream, Apply topically 2 times daily as needed (skin irritation)  colchicine (COLCYRS) 0.6 MG tablet, Take 1 tablet (0.6 mg) by mouth 2 times daily (Patient not taking: Reported on 10/26/2022)  colchicine (COLCYRS) 0.6 MG tablet, Take 1-2 tablets (0.6-1.2 mg) by mouth daily as needed for moderate pain  Continuous Blood Gluc Sensor (FREESTYLE BELLO 14 DAY SENSOR) MISC, 1 each every 14 days  Continuous Blood Gluc Sensor (FREESTYLE BELLO 2 SENSOR) MISC, 1 Units every 14 days Check BG 4 times daily before meals and at bedtime.  diclofenac (VOLTAREN) 1 % topical gel, Apply 4 grams to knees or 2 grams to hands four times daily using enclosed dosing card.  DULoxetine (CYMBALTA) 20 MG capsule, Take 1 capsule (20 mg) by mouth 2 times daily (Patient not taking: Reported on 10/26/2022)  Efinaconazole 10 % SOLN, Externally apply topically daily To toenails.  gabapentin (NEURONTIN) 300 MG capsule, Take 2 capsules (600 mg) by mouth 3 times daily  hydrALAZINE (APRESOLINE) 25 MG tablet, Take 3 tablets (75 mg) by mouth 3 times daily  hydrochlorothiazide (HYDRODIURIL) 12.5 MG tablet, Take 1 tablet (12.5 mg) by mouth 2 times daily  insulin aspart (NOVOLOG FLEXPEN) 100 UNIT/ML pen, Inject 100  units with meals,plus correction.Pt uses approx 325 units/24 hrs.  insulin glargine U-300 (TOUJEO MAX SOLOSTAR) 300 UNIT/ML (2 units dial) pen, Inject subcu Toujeo 225 units in the morning  insulin pen needle (BD RIVER U/F) 32G X 4 MM miscellaneous, Use 6 daily or as directed  insulin reg HIGH CONC (HUMULIN R U-500 KWIKPEN) 500 UNIT/ML PEN soln, Give 200 units 30 - 60 minutes before breakfast and 150 units before lunch and dinner.   units.  isosorbide dinitrate (ISORDIL) 20 MG tablet, Take 1 tablet (20 mg) by mouth 3 times daily  JARDIANCE 25 MG TABS tablet, TAKE ONE TABLET BY MOUTH ONCE DAILY  ketorolac (ACULAR) 0.5 % ophthalmic solution, Place  1 Drop into right eye 3 times daily.  levothyroxine (SYNTHROID/LEVOTHROID) 200 MCG tablet, Take one tablet (200mg) by mouth daily Monday through Friday, and take two tablets (400mcg) on Saturdays and Sundays  metoprolol succinate ER (TOPROL XL) 50 MG 24 hr tablet, Take 1 tablet (50 mg) by mouth daily  nicotine (NICODERM CQ) 14 MG/24HR 24 hr patch, Place 1 patch onto the skin every 24 hours  nystatin (MYCOSTATIN) 590613 UNIT/GM external cream, Apply topically 2 times daily To toenails  order for DME, Left foot  order for DME, Equipment being ordered: [employer] [id number] $92   Plantar, fasciitis, LG, night splint  order for DME, Equipment being ordered: Challenger Wide walker if available - patient needs seat, basket and brakes.  ORDER FOR DME, Use your CPAP device as directed by your provider. Pressure change to min 13 max 18cwp  oxyCODONE-acetaminophen (PERCOCET) 5-325 MG per tablet, Take 1 tablet by mouth every 8 hours as needed for moderate to severe pain (try to limit use, no further prescriptions until seen in pain clinic)  polyethylene glycol (MIRALAX/GLYCOLAX) powder,   potassium chloride ER (KLOR-CON M) 20 MEQ CR tablet, TAKE TWO TABLETS BY MOUTH FOUR TIMES A DAY  prednisoLONE acetate (PRED FORTE) 1 % ophthalmic suspension, As directed 1 Drop 3 times daily. Follow physician instructions for the operative eye.  Respiratory Therapy Supplies (NEBULIZER COMPRESSOR) KIT, 1 Device 4 times daily as needed.  Semaglutide, 1 MG/DOSE, 4 MG/3ML SOPN, Inject 1 mg Subcutaneous once a week  senna (SENOKOT) 8.6 MG tablet, Take 1 tablet by mouth 2 times daily  spironolactone (ALDACTONE) 50 MG tablet, Take 1 tablet (50 mg) by mouth daily  tiotropium (SPIRIVA HANDIHALER) 18 MCG inhalation capsule, Inhale contents of one capsule daily.  topiramate (TOPAMAX) 25 MG tablet, 25 mg at bedtime for 1 week, 50 mg at bedtime for 1 week and 75 mg daily at bedtime thereafter    sodium chloride (PF) 0.9% PF flush 10 mL        ROS:   Refer to  HPI    EXAM:  There were no vitals taken for this visit.  GENERAL: Appears comfortable, in no acute distress.   HEENT: Eye symmetrical, no discharge or icterus bilaterally. Mucous membranes moist and without lesions.  CV: irregular with extrasystoles, +S1S2, systolic murmur, rub, or gallop. JVP difficult to assess  RESPIRATORY: Respirations regular, even, and unlabored. Lungs CTA throughout.   GI: Soft and non distended with normoactive bowel sounds present in all quadrants. No tenderness, rebound, guarding. No hepatomegaly.   EXTREMITIES: Trace peripheral edema. 2+ bilateral pedal pulses.   NEUROLOGIC: Alert and oriented x 3. No focal deficits.   MUSCULOSKELETAL: No joint swelling or tenderness.   SKIN: No jaundice. No rashes or lesions.     Labs, reviewed with patient in clinic today:  CBC RESULTS:  Lab Results   Component Value Date    WBC 8.9 10/26/2022    WBC 8.5 06/03/2021    RBC 5.06 10/26/2022    RBC 5.54 (H) 06/03/2021    HGB 15.2 10/26/2022    HGB 16.7 (H) 06/03/2021    HCT 48.2 (H) 10/26/2022    HCT 50.9 (H) 06/03/2021    MCV 95 10/26/2022    MCV 92 06/03/2021    MCH 30.0 10/26/2022    MCH 30.1 06/03/2021    MCHC 31.5 10/26/2022    MCHC 32.8 06/03/2021    RDW 16.2 (H) 10/26/2022    RDW 13.3 06/03/2021     10/26/2022     06/03/2021       CMP RESULTS:  Lab Results   Component Value Date     10/26/2022     (L) 06/03/2021    POTASSIUM 3.8 10/26/2022    POTASSIUM 3.5 06/15/2022    POTASSIUM 4.2 06/03/2021    CHLORIDE 101 10/26/2022    CHLORIDE 103 06/15/2022    CHLORIDE 98 06/03/2021    CO2 31 (H) 10/26/2022    CO2 29 06/15/2022    CO2 25 06/03/2021    ANIONGAP 9 10/26/2022    ANIONGAP 3 06/15/2022    ANIONGAP 7 06/03/2021     (H) 10/26/2022     (H) 06/15/2022     (HH) 06/03/2021    BUN 25.1 (H) 10/26/2022    BUN 17 06/15/2022    BUN 13 06/03/2021    CR 1.25 (H) 10/26/2022    CR 0.89 06/03/2021    GFRESTIMATED 53 (L) 10/26/2022    GFRESTIMATED 77 06/03/2021     GFRESTBLACK 89 06/03/2021    JUSTIN 8.9 10/26/2022    JUSTIN 8.7 06/03/2021    BILITOTAL 0.7 10/26/2022    BILITOTAL 0.5 08/19/2020    ALBUMIN 3.3 (L) 10/26/2022    ALBUMIN 2.8 (L) 06/15/2022    ALBUMIN 2.9 (L) 08/19/2020    ALKPHOS 103 10/26/2022    ALKPHOS 73 08/19/2020    ALT 39 (H) 10/26/2022    ALT 30 08/19/2020    AST 23 10/26/2022    AST 13 08/19/2020        INR RESULTS:  Lab Results   Component Value Date    INR 0.99 08/19/2020       Lab Results   Component Value Date    MAG 1.6 09/09/2021    MAG 1.8 01/06/2015     Lab Results   Component Value Date    NTBNPI 30 02/23/2015     Lab Results   Component Value Date    NTBNP 2,270 (H) 10/26/2022    NTBNP 100 08/19/2020       Cardiac Diagnostics:    9/24/2022 ECHO ([facility])  Interpretation Summary     * Technically difficult study.     * The left ventricle is normal size. The left ventricular systolic function   is severely reduced, quantified ejection fraction is 20-25%.     * Left ventricular wall motion is abnormal with severe global hypokinesis.     * Quantitatively reduced right ventricular systolic function.     * There is no hemodynamically significant valvular heart disease.     * Right ventricular systolic pressure may be underestimated due to   incomplete Doppler waveform.     * There is no pericardial effusion.     * No prior study.     6/14/2022 ECHO  Interpretation Summary     Technically difficult study.Extremely poor acoustic windows.  Limited information was obtained during study.  Left ventricular size is normal.  The visual ejection fraction is 40-45%.  Right ventricular function cannot be assessed due to poor image quality    6/26/2022 ECHO   Interpretation Summary     Technically difficult study.Extremely poor acoustic windows.  Limited information was obtained during study.  Left ventricular size is normal.  LVEF 43% based on biplane 2D tracing.  The inferior vena cava is normal.      Assessment and Plan:   Ms. Brown is a 47 year old female  with medical history pertinent for morbid obesity, paroxysmal AF, DM II, HTN, NICM with an EF of 40-45%, and JYOTI who returns to [facility] for follow up.     Feeling much better since previous visit with ongoing diuresis and weight down 16 lbs. Exam difficult to assess volume status, although LE edema improved. Unclear if certain medications were discontinued with recent hospitalization or if she ran out of refills. She is hypertensive in clinic, although her home readings appear adequate. In an effort to reinitiate GDMT, will restart on low dose hydralazine and isordil. Also will resume jardiance and statin. Review of labs today demonstrate worsening renal function with Cr 1.58. Will hold on resuming spironolactone at this time and reduce bumex to 4 mg BID with repeat BMP in 1 week.       # Chronic systolic heart failure/HFrEF (20/25%) secondary to NICM    Stage C. NYHA Class IIII    Fluid status: mildly hypervolemic, reduce bumex 4 mg BID  ACEi/ARB/ARNi/afterload reduction: start hydralazine 12.5 mg TID, isordil 20 mg TID (hx of possible angioedema to ACEi, never tried on ARB), hydrochlorothiazide 12.5 mg daily   BB: metoprolol succinate 50 mg daily   Aldosterone antagonist: hold in the setting of NIRAJ   SGLT2i: resume jardiance 25 mg daily   SCD prophylaxis: does not meet criteria for implant  NSAID use: contraindicated  Sleep apnea evaluation: not wearing CPAP    # HTN, borderline control   - goal SBP < 130/80  - start hydralazine and isordil, continue hydrochlorothiazide     # Paroxysmal AF, [id number] 4  - continue eliquis 5 mg BID  - continue metoprolol for rate control     # T2DM; uncontrolled  - A1c > 15% (7/2021)  - No showed recent appointments; stressed importance of following up with endo  - Novolog, Toujeo, Humulin, Jardiance          Follow up:  - Dr. Alcala 11/9/2022  - Endocrine 11/22, 11/23  - [facility] in 1 month    Chart review time today: 20 minutes  Visit time today: 30 minutes  Total time spent today:  50 minutes        Yuliet Renee DNP, NP-C  Advance Heart Failure  10/30/22          CC  CHERYL PAINTING

## 2022-10-31 NOTE — NURSING NOTE
Chief Complaint   Patient presents with     Follow Up     10/31/22: Return [facility] HFpEF, labs prior     Vitals were taken and medications reconciled.    Andriy Jacob, EMT  8:26 AM

## 2022-10-31 NOTE — TELEPHONE ENCOUNTER
[facility] Call Center    Phone Message    May a detailed message be left on voicemail: yes     Reason for Call: Medication Refill Request    Has the patient contacted the pharmacy for the refill? Yes   Name of medication being requested: insulin glargine U-300 and insulin aspart  Provider who prescribed the medication: Dr. Lr   Pharmacy:  Date medication is needed: ASAP     Action Taken: Other: Endo     Travel Screening: Not Applicable

## 2022-10-31 NOTE — TELEPHONE ENCOUNTER
Reviewed new recommendations with Pricilla after lab results were reviewed:    Date: 10/31/2022    Time of Call: 1:07 PM     Diagnosis:  Heart failure     [ VORB ] Ordering provider: Yuliet Renee NP    Order: hold bumex today, restart tomorrow at 4 mg BID, labs next week.  Extra 1-2 glasses of water next two days      Order received by: Shanice Ambrocio RN       Follow-up/additional notes: Pricilla was able to read back directions.  Has labs scheduled already for 11/9

## 2022-10-31 NOTE — NURSING NOTE
Diet: Patient instructed regarding a heart failure healthy diet, including discussion of reduced fat and 2000 mg daily sodium restriction, daily weights, medication purpose and compliance, fluid restrictions and resources for patient and family to access for assistance with heart failure management.       Labs: sent to lab for lab draw.     Med Reconcile: Reviewed and verified all current medications with the patient. The updated medication list was printed and given to the patient.     Med changes: restart hydralazine, isordil, jardiance.  TBD re spironolactone- will depend on labs today    Return Appointment: Patient given instructions regarding scheduling next clinic visit: Dr Alcala on 11/9 and [facility] in december    Patient stated she understood all health information given and agreed to call with further questions or concerns.     Shanice Ambrocio RN
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
## 2022-10-31 NOTE — LETTER
10/31/2022      RE: Pricilla Brown  [address]  [facility] [address]       Dear Colleague,    Thank you for the opportunity to participate in the care of your patient, Pricilla Brown, at the [facility] [place] [facility]. Please see a copy of my visit note below.        [facility]    HPI:   Ms. Brown is a 47 year old female with medical history pertinent for morbid obesity, paroxysmal AF, DM II, HTN, NICM with an EF of 40-45%, and JYOTI who [place] [facility] [place].       With regards to her cardiac conditions, Ms. Brown has a longstanding history of heart failure. First diagnosed with diastolic dysfunction in 2013, and later systolic failure in 2017. Most recently, she was hospitalized at [facility] from 9/23/22-10/2/22 with acute hypoxic respiratory failure and sepsis of unclear etiology. CTA chest abdomen pelvis did not reveal any source of infection. Blood culture on admission growing E.coli and she was treated with IV rocephin for 9 days followed by 1 day of PO cefdinir for a 10 day course. Cardiology evaluated patient in consultation for possible NSTEMI. Echo showed severe global hypokinesis and severely reduced left ventricular systolic function with EF 20 to 25%. At discharge her Cr was 1.43 and she started on Bumex 4 mg BID.    Ms. Brown followed up with Dr. Alcala on 10/26/22. At that time she was volume up and reported MURRAY, PND, orthopnea, and LE edema. Continued to have uncontrolled hypertension. She was started on hydrochlorothiazide.     Today, Ms. Brown presents to [facility]. She reports feeling significantly better since last clinic visit. She is down 16 lbs. Since starting hydrochlorothiazide she notes increased UOP. Her MURRAY is much better, able to catcher her breath easier. She is no longer sleeping in her recliner chair and is able to sleep in bed with 2 pillows. Also notes reduced LE edema. Continues to  have brief, intermittent episodes of palpitations, although she feels that this is also improved. Remains on metoprolol succinate 50 mg daily for rate control and eliquis for CVA prophylaxis. Denies any bleeding episodes. Denies chest pain, acute SOB, lightheadedness, syncope, or near syncope.      She occasionally will monitor her blood pressure at home and tells me that her systolic blood pressures run 112-120s, although she is uncertain of the diastolic range. She does not monitor her weights at home, reports that her scale is broken. Trying to be mindful of her 2 L FR.     In review of her medications she confirms that she is taking eliquis and ASA. She has not been taking her statin, hydralazine, isordil, or spironolactone because she ran out. Stopped jardiance because she was worried about potential side effects, although denied having any issues while taking it. She was discharged from [facility] with orders for bumex 4 mg BID, however she tells me that she is taking 5 mg BID.      Cardiac Medications:   - Eliquis 5 mg BID  - Atorvastatin 40 mg daily   - Bumex 5 mg BID  - KCL 40 mEq QID  - Hydralazine 75 mg TID- not taking   - Hydrochlorothiazide 12.5 mg BID  - Isordil 20 mg TID- not taking   - Jardiance 25 mg daily- not taking d/t side effects    - Metoprolol succinate 50 mg daily  - Spironolactone 50 mg daily-not taking       PAST MEDICAL HISTORY:  Past Medical History:   Diagnosis Date     A-fib (H) 2011    on coumadin since 1/13     Asthma     as a kid     Chest pain 2/1/2017     Chronic anticoagulation for a-fib 2/15/2013    INR's followed by coumadin clinic at      Diabetes mellitus (H) 2012     Diastolic heart failure 2/15/2013     Dilated cardiomyopathy (H) 1/8/2013     HTN (hypertension)      Hyperthyroidism     Graves, s/p I131 1/13, now on prednisone and methimazole     Mixed type age-related cataract, both eyes 4/8/2022     Morbid obesity (H)      Pulmonary embolism (H) 1/12    hospitalized in [place], on  lovenox/coumadin for a few months but stopped, hypercoag w/u neg per pt     Sleep apnea     using CPAP       FAMILY HISTORY:  Family History   Problem Relation Age of Onset     Thyroid Disease Mother         Grave's D     Diabetes Mother      Heart Disease Mother      Cerebrovascular Disease Mother         dec. 56 yo     Hypertension Mother      Heart Disease Sister         Heart Murmur     Diabetes Sister      Heart Disease Father         dec in his 30s, MI     Psychotic Disorder Brother         Bipolar     Diabetes Brother      Thyroid Disease Brother         Hyper Thyroid     Heart Disease Brother      Thyroid Disease Sister         Hyper Thyroid     Thyroid Disease Sister         Hyper Thyroid     Thyroid Disease Sister         Mental Health Problems     Thyroid Disease Maternal Aunt      Thyroid Disease Maternal Uncle      Cancer No family hx of      Glaucoma No family hx of      Macular Degeneration No family hx of        SOCIAL HISTORY:  Social History     Socioeconomic History     Marital status: Single   Tobacco Use     Smoking status: Light Smoker     Packs/day: 0.25     Years: 13.00     Pack years: 3.25     Types: Cigarettes     Smokeless tobacco: Never     Tobacco comments:     down to 2 cigs a day   Substance and Sexual Activity     Alcohol use: No     Drug use: No     Sexual activity: Yes     Partners: Male     Birth control/protection: Condom   Social History Narrative    Single, no children        Gyn:        Last pap several years ago, no abnormal    No STIs            Patient is single.  She is no longer working.  She used to work in the area of customer service.  She is currently living with her sister in [place], [place].  She has no pets.  Patient has smoked a half pack of cigarettes a day for the past 10 plus years.  She states that she is down to 5 cigarettes a day with the aid of Wellbutrin.  She does not smoke cigars, pipes or chew tobacco.  She has 1 cup of coffee in the  morning.  She does not drink alcohol.  Patient does not exercise.        CURRENT MEDICATIONS:  ACCU-CHEK RON PLUS test strip, USE TO TEST BLOOD SUGAR FOUR TIMES A DAY  OR AS DIRECTED.  acetaminophen (TYLENOL) 325 MG tablet, Take 2 tablets (650 mg) by mouth 3 times daily as needed for mild pain or fever (total acetaminophen dose should not exceed 3000 mg per day)  albuterol (VENTOLIN HFA) 108 (90 Base) MCG/ACT inhaler, Inhale 2 puffs into the lungs every 6 hours as needed for shortness of breath / dyspnea  apixaban ANTICOAGULANT (ELIQUIS) 5 MG tablet, Take 1 tablet (5 mg) by mouth 2 times daily  atorvastatin (LIPITOR) 40 MG tablet, Take 1 tablet (40 mg) by mouth daily  bisacodyl (DULCOLAX) 10 MG suppository, Place 1 suppository (10 mg) rectally daily as needed for constipation  blood glucose (NO BRAND SPECIFIED) lancets standard, USE TO CHECK  blood sugar fasting each am  prelunch and predinner daily OR AS DIRECTED Call clinic to schedule MD APPT.  blood glucose (NO BRAND SPECIFIED) test strip, Use to test blood sugar 4 times daily or as directed. Accu check Ron plus  blood glucose (NO BRAND SPECIFIED) test strip, Use to test blood sugar 4 times daily or as directed.  blood glucose (NO BRAND SPECIFIED) test strip, Use to test blood sugars 3 times daily or as directed  blood glucose monitoring (ACCU-CHEK FASTCLIX) lancets, Use to test blood sugar 4 times daily or as directed.or lancets that go with meter being used  blood glucose monitoring (IBG STAR) meter device kit, -PLEASE GIVE PATIENT A DEVICE HER INSURANCE WILL COVER-  blood glucose monitoring (NO BRAND SPECIFIED) meter device kit, Use to test blood sugar 4  times daily or as directed.  blood glucose monitoring (NO BRAND SPECIFIED) meter device kit, Use to test blood sugar 3 times daily or as directed.  Blood Pressure KIT, Use to check blood pressure as directed, once daily and as needed. Record results.  bumetanide (BUMEX) 1 MG tablet, Take 5 tablets (5 mg)  by mouth 2 times daily For additional refills, please schedule a follow-up appointment at [phone] with Dr. Alcala or Kristen Cuello NP  capsaicin (ZOSTRIX) 0.025 % external cream, Apply 1 g topically 3 times daily as needed (use for neuropathy pain)  ciclopirox (LOPROX) 0.77 % cream, Apply topically 2 times daily To feet and toenails.  ciclopirox (PENLAC) 8 % external solution, Apply topically daily To toenails.  clotrimazole (LOTRIMIN) 1 % external cream, Apply topically 2 times daily as needed (skin irritation)  colchicine (COLCYRS) 0.6 MG tablet, Take 1 tablet (0.6 mg) by mouth 2 times daily (Patient not taking: Reported on 10/26/2022)  colchicine (COLCYRS) 0.6 MG tablet, Take 1-2 tablets (0.6-1.2 mg) by mouth daily as needed for moderate pain  Continuous Blood Gluc Sensor (FREESTYLE BELLO 14 DAY SENSOR) MISC, 1 each every 14 days  Continuous Blood Gluc Sensor (FREESTYLE BELLO 2 SENSOR) MISC, 1 Units every 14 days Check BG 4 times daily before meals and at bedtime.  diclofenac (VOLTAREN) 1 % topical gel, Apply 4 grams to knees or 2 grams to hands four times daily using enclosed dosing card.  DULoxetine (CYMBALTA) 20 MG capsule, Take 1 capsule (20 mg) by mouth 2 times daily (Patient not taking: Reported on 10/26/2022)  Efinaconazole 10 % SOLN, Externally apply topically daily To toenails.  gabapentin (NEURONTIN) 300 MG capsule, Take 2 capsules (600 mg) by mouth 3 times daily  hydrALAZINE (APRESOLINE) 25 MG tablet, Take 3 tablets (75 mg) by mouth 3 times daily  hydrochlorothiazide (HYDRODIURIL) 12.5 MG tablet, Take 1 tablet (12.5 mg) by mouth 2 times daily  insulin aspart (NOVOLOG FLEXPEN) 100 UNIT/ML pen, Inject 100  units with meals,plus correction.Pt uses approx 325 units/24 hrs.  insulin glargine U-300 (TOUJEO MAX SOLOSTAR) 300 UNIT/ML (2 units dial) pen, Inject subcu Toujeo 225 units in the morning  insulin pen needle (BD RIVER U/F) 32G X 4 MM miscellaneous, Use 6 daily or as directed  insulin reg HIGH CONC  (HUMULIN R U-500 KWIKPEN) 500 UNIT/ML PEN soln, Give 200 units 30 - 60 minutes before breakfast and 150 units before lunch and dinner.   units.  isosorbide dinitrate (ISORDIL) 20 MG tablet, Take 1 tablet (20 mg) by mouth 3 times daily  JARDIANCE 25 MG TABS tablet, TAKE ONE TABLET BY MOUTH ONCE DAILY  ketorolac (ACULAR) 0.5 % ophthalmic solution, Place 1 Drop into right eye 3 times daily.  levothyroxine (SYNTHROID/LEVOTHROID) 200 MCG tablet, Take one tablet (200mg) by mouth daily Monday through Friday, and take two tablets (400mcg) on Saturdays and Sundays  metoprolol succinate ER (TOPROL XL) 50 MG 24 hr tablet, Take 1 tablet (50 mg) by mouth daily  nicotine (NICODERM CQ) 14 MG/24HR 24 hr patch, Place 1 patch onto the skin every 24 hours  nystatin (MYCOSTATIN) 528441 UNIT/GM external cream, Apply topically 2 times daily To toenails  order for DME, Left foot  order for DME, Equipment being ordered: [employer] [phone] $92   Plantar, fasciitis, LG, night splint  order for DME, Equipment being ordered: Challenger Wide walker if available - patient needs seat, basket and brakes.  ORDER FOR DME, Use your CPAP device as directed by your provider. Pressure change to min 13 max 18cwp  oxyCODONE-acetaminophen (PERCOCET) 5-325 MG per tablet, Take 1 tablet by mouth every 8 hours as needed for moderate to severe pain (try to limit use, no further prescriptions until seen in pain clinic)  polyethylene glycol (MIRALAX/GLYCOLAX) powder,   potassium chloride ER (KLOR-CON M) 20 MEQ CR tablet, TAKE TWO TABLETS BY MOUTH FOUR TIMES A DAY  prednisoLONE acetate (PRED FORTE) 1 % ophthalmic suspension, As directed 1 Drop 3 times daily. Follow physician instructions for the operative eye.  Respiratory Therapy Supplies (NEBULIZER COMPRESSOR) KIT, 1 Device 4 times daily as needed.  Semaglutide, 1 MG/DOSE, 4 MG/3ML SOPN, Inject 1 mg Subcutaneous once a week  senna (SENOKOT) 8.6 MG tablet, Take 1 tablet by mouth 2 times  daily  spironolactone (ALDACTONE) 50 MG tablet, Take 1 tablet (50 mg) by mouth daily  tiotropium (SPIRIVA HANDIHALER) 18 MCG inhalation capsule, Inhale contents of one capsule daily.  topiramate (TOPAMAX) 25 MG tablet, 25 mg at bedtime for 1 week, 50 mg at bedtime for 1 week and 75 mg daily at bedtime thereafter    sodium chloride (PF) 0.9% PF flush 10 mL        ROS:   Refer to HPI    EXAM:  There were no vitals taken for this visit.  GENERAL: Appears comfortable, in no acute distress.   HEENT: Eye symmetrical, no discharge or icterus bilaterally. Mucous membranes moist and without lesions.  CV: irregular with extrasystoles, +S1S2, systolic murmur, rub, or gallop. JVP difficult to assess  RESPIRATORY: Respirations regular, even, and unlabored. Lungs CTA throughout.   GI: Soft and non distended with normoactive bowel sounds present in all quadrants. No tenderness, rebound, guarding. No hepatomegaly.   EXTREMITIES: Trace peripheral edema. 2+ bilateral pedal pulses.   NEUROLOGIC: Alert and oriented x 3. No focal deficits.   MUSCULOSKELETAL: No joint swelling or tenderness.   SKIN: No jaundice. No rashes or lesions.     Labs, reviewed with patient in clinic today:  CBC RESULTS:  Lab Results   Component Value Date    WBC 8.9 10/26/2022    WBC 8.5 06/03/2021    RBC 5.06 10/26/2022    RBC 5.54 (H) 06/03/2021    HGB 15.2 10/26/2022    HGB 16.7 (H) 06/03/2021    HCT 48.2 (H) 10/26/2022    HCT 50.9 (H) 06/03/2021    MCV 95 10/26/2022    MCV 92 06/03/2021    MCH 30.0 10/26/2022    MCH 30.1 06/03/2021    MCHC 31.5 10/26/2022    MCHC 32.8 06/03/2021    RDW 16.2 (H) 10/26/2022    RDW 13.3 06/03/2021     10/26/2022     06/03/2021       CMP RESULTS:  Lab Results   Component Value Date     10/26/2022     (L) 06/03/2021    POTASSIUM 3.8 10/26/2022    POTASSIUM 3.5 06/15/2022    POTASSIUM 4.2 06/03/2021    CHLORIDE 101 10/26/2022    CHLORIDE 103 06/15/2022    CHLORIDE 98 06/03/2021    CO2 31 (H) 10/26/2022     CO2 29 06/15/2022    CO2 25 06/03/2021    ANIONGAP 9 10/26/2022    ANIONGAP 3 06/15/2022    ANIONGAP 7 06/03/2021     (H) 10/26/2022     (H) 06/15/2022     (HH) 06/03/2021    BUN 25.1 (H) 10/26/2022    BUN 17 06/15/2022    BUN 13 06/03/2021    CR 1.25 (H) 10/26/2022    CR 0.89 06/03/2021    GFRESTIMATED 53 (L) 10/26/2022    GFRESTIMATED 77 06/03/2021    GFRESTBLACK 89 06/03/2021    JUSTIN 8.9 10/26/2022    JUSTIN 8.7 06/03/2021    BILITOTAL 0.7 10/26/2022    BILITOTAL 0.5 08/19/2020    ALBUMIN 3.3 (L) 10/26/2022    ALBUMIN 2.8 (L) 06/15/2022    ALBUMIN 2.9 (L) 08/19/2020    ALKPHOS 103 10/26/2022    ALKPHOS 73 08/19/2020    ALT 39 (H) 10/26/2022    ALT 30 08/19/2020    AST 23 10/26/2022    AST 13 08/19/2020        INR RESULTS:  Lab Results   Component Value Date    INR 0.99 08/19/2020       Lab Results   Component Value Date    MAG 1.6 09/09/2021    MAG 1.8 01/06/2015     Lab Results   Component Value Date    NTBNPI 30 02/23/2015     Lab Results   Component Value Date    NTBNP 2,270 (H) 10/26/2022    NTBNP 100 08/19/2020       Cardiac Diagnostics:    9/24/2022 ECHO ([facility])  Interpretation Summary     * Technically difficult study.     * The left ventricle is normal size. The left ventricular systolic function   is severely reduced, quantified ejection fraction is 20-25%.     * Left ventricular wall motion is abnormal with severe global hypokinesis.     * Quantitatively reduced right ventricular systolic function.     * There is no hemodynamically significant valvular heart disease.     * Right ventricular systolic pressure may be underestimated due to   incomplete Doppler waveform.     * There is no pericardial effusion.     * No prior study.     6/14/2022 ECHO  Interpretation Summary     Technically difficult study.Extremely poor acoustic windows.  Limited information was obtained during study.  Left ventricular size is normal.  The visual ejection fraction is 40-45%.  Right ventricular  function cannot be assessed due to poor image quality    6/26/2022 ECHO   Interpretation Summary     Technically difficult study.Extremely poor acoustic windows.  Limited information was obtained during study.  Left ventricular size is normal.  LVEF 43% based on biplane 2D tracing.  The inferior vena cava is normal.      Assessment and Plan:   Ms. Brown is a 47 year old female with medical history pertinent for morbid obesity, paroxysmal AF, DM II, HTN, NICM with an EF of 40-45%, and JYOTI who [place] [facility] [place].     Feeling much better since previous visit with ongoing diuresis and weight down 16 lbs. Exam difficult to assess volume status, although LE edema improved. Unclear if certain medications were discontinued with recent hospitalization or if she ran out of refills. She is hypertensive in clinic, although her home readings appear adequate. In an effort to reinitiate GDMT, will restart on low dose hydralazine and isordil. Also will resume jardiance and statin. Review of labs today demonstrate worsening renal function with Cr 1.58. Will hold on resuming spironolactone at this time and reduce bumex to 4 mg BID with repeat BMP in 1 week.       # Chronic systolic heart failure/HFrEF (20/25%) secondary to NICM    Stage C. NYHA Class IIII    Fluid status: mildly hypervolemic, reduce bumex 4 mg BID  ACEi/ARB/ARNi/afterload reduction: start hydralazine 12.5 mg TID, isordil 20 mg TID (hx of possible angioedema to ACEi, never tried on ARB), hydrochlorothiazide 12.5 mg daily   BB: metoprolol succinate 50 mg daily   Aldosterone antagonist: hold in the setting of NIRAJ   SGLT2i: resume jardiance 25 mg daily   SCD prophylaxis: does not meet criteria for implant  NSAID use: contraindicated  Sleep apnea evaluation: not wearing CPAP    # HTN, borderline control   - goal SBP < 130/80  - start hydralazine and isordil, continue hydrochlorothiazide     # Paroxysmal AF, [id number]  - continue eliquis 5 mg BID  -  continue metoprolol for rate control     # T2DM; uncontrolled  - A1c > 15% (7/2021)  - No showed recent appointments; stressed importance of following up with endo  - Novolog, Toujeo, Humulin, Jardiance          Follow up:  - Dr. Alcala 11/9/2022  - Endocrine 11/22, 11/23  - CORE in 1 month    Chart review time today: 20 minutes  Visit time today: 30 minutes  Total time spent today: 50 minutes        Yuliet Renee DNP, NP-C  Advance Heart Failure  10/30/22          CC  CHERYL ALCALA

## 2022-11-03 NOTE — TELEPHONE ENCOUNTER
LMTCB.  To check on patient and her BG's. She is not connected to our Jessica account at [facility]. Also wondering if pt could move appt up to Monday with CDE      '  Valentine WILDER, RN, [facility]  Certified Diabetes Care and   [facility]  [address] [place] [address]  Phone [phone]     Please let Mary know that:    1.  Tested negative for diabetes.  2.  Liver/kidney tests normal.  3.  Cholesterol is slightly elevated   4.  Blood counts are normal, she is not anemic    Thanks.

## 2022-11-08 NOTE — TELEPHONE ENCOUNTER
[facility] Prior Authorization Team Request    Medication: TOUJEO MAX SOLOSTAR 300 SOPN  Dosin UNITS subcutaneous EVERY MORNING   Qty: 66    Day Supply: 88  NDC (required for Medicaid members): [address]-65     Insurance   BIN: [id number]  PCN: [id number]  Grp: [id number]  ID: [id number]      CoverMyMeds Key (if applicable):     Additional documentation: PA RENEWAL      Filling Pharmacy: [facility]  Phone Number: [phone]  Contact:    Pharmacy NPI (required for Medicaid members): [id number]

## 2022-11-08 NOTE — TELEPHONE ENCOUNTER
PA Initiation    Medication: TOUJEO MAX - PA Pending  Insurance Company: [employer] Part D - Phone [phone] Fax [phone]  Pharmacy Filling the Rx:    Filling Pharmacy Phone:    Filling Pharmacy Fax:    Start Date: 11/8/2022

## 2022-11-09 NOTE — TELEPHONE ENCOUNTER
Prior Authorization Approval    Authorization Effective Date: 3/1/2022  Authorization Expiration Date: 11/8/2023  Medication: TOUJEO MAX - PA approved  Approved Dose/Quantity: 15ml  Reference #: [id number] [id number]   Insurance Company: [employer] Part D - Phone [phone] Fax [phone]  Expected CoPay:       CoPay Card Available:      Foundation Assistance Needed:    Which Pharmacy is filling the prescription (Not needed for infusion/clinic administered):    Pharmacy Notified:    Patient Notified:

## 2022-11-10 NOTE — TELEPHONE ENCOUNTER
"Called patient to discuss upcoming appointment. Given recent and prolonged history of no-shows, I asked her how we can best ensure that she makes it to this appointment. She said in the past she has missed appointments because she doesn't have reliable transportation and because sometimes doesn't feel well due to cardiac history. She has reliable transportation now and the appointment in virtual so that should not be a problem. I also told her that someone would reach out to her ahead of time to remind her of her appointment and to obtain her blood sugar history for the provider.     Patient stated that she \"promises to try really hard\" to make the appointment or to call in advance if something comes up and she can't.     I will remove the hold on her schedule that requires her to talk with manager prior to scheduling with ned.    -Latonia Eubanks RN   Clinic Supervisor, Ned   "

## 2022-11-10 NOTE — TELEPHONE ENCOUNTER
insulin reg HIGH CONC (HUMULIN R U-500 KWIKPEN) 500 UNIT/ML PEN soln        Last Written Prescription Date:  08/23/22  Last Fill Quantity: 60mL,   # refills: 1  Last Office Visit : 09/13/22  Future Office visit:  11/22/22    Routing refill request to provider for review/approval because:  Insulin - refilled per clinic

## 2022-12-25 PROBLEM — E87.70 HYPERVOLEMIA DUE TO CONGESTIVE HEART FAILURE (H): Status: ACTIVE | Noted: 2022-01-01

## 2022-12-25 PROBLEM — I50.9 HYPERVOLEMIA DUE TO CONGESTIVE HEART FAILURE (H): Status: ACTIVE | Noted: 2022-01-01

## 2022-12-25 PROBLEM — M79.89 LEG SWELLING: Status: ACTIVE | Noted: 2022-01-01

## 2022-12-25 PROBLEM — I48.91 ATRIAL FIBRILLATION WITH RAPID VENTRICULAR RESPONSE (H): Status: ACTIVE | Noted: 2022-01-01

## 2022-12-25 PROBLEM — I50.23 ACUTE ON CHRONIC HFREF (HEART FAILURE WITH REDUCED EJECTION FRACTION) (H): Status: ACTIVE | Noted: 2022-01-01

## 2022-12-25 NOTE — ED NOTES
Bed: ED05  Expected date: 12/25/22  Expected time:   Means of arrival:   Comments:  Lorenza Cornell4

## 2022-12-25 NOTE — ED PROVIDER NOTES
ED Provider Note  [facility]      History     Chief Complaint   Patient presents with     Chest Pain     HPI  Pricilla Brown is a 47 year old female with a past history of obesity, asthma, DM2, HTN, persistent atrial fibrillation - on Eliquis, chronic HFrEF, NICM (EF 20% by echo 9/2022), normal coronary angiography 1/2012, Graves dz s/p radioiodine ablation - now hypothyroid, JYOTI, CKD who presents via EMS for complaints of chest pain.    Reports while at rest lasting approximately 1 PM she developed midsternal chest pain that has been episodic since.  She reports that accompanying nausea, shortness of breath and feeling of heart palpitations.  She reports with her history of A. fib she does feel her heart racing, but  palpitations has been fine since last night is different than her normal A. fib.  She reports until last evening she had been feeling well and not have been having shortness of breath, cough and, reports her lower extremity edema had improved.    Today she reports continued midsternal chest pain, continued nausea with an episode of vomiting, shortness of breath and does report worsening lower extremity edema.  Does endorse taking her Eliquis today, but reports over the last 3 to 5 days she has not been compliant with her other medication including metoprolol, and Bumex.    Fevers, chills, abdominal pain, or change in urination.  She was given 4 mg Zofran ODT and 325 mg ASA by EMS on transport, and glucose was 430. She reports she does not weigh her self consistantly but she reports her most recent known weight was around 400 pounds, current bed scale shows weight greater than 500 lbs.    Past Medical History  Past Medical History:   Diagnosis Date     A-fib (H) 2011    on coumadin since 1/13     Asthma     as a kid     Chest pain 2/1/2017     Chronic anticoagulation for a-fib 2/15/2013    INR's followed by coumadin clinic at      Diabetes mellitus (H) 2012     Diastolic  heart failure 2/15/2013     Dilated cardiomyopathy (H) 1/8/2013     HTN (hypertension)      Hyperthyroidism     Graves, s/p I131 1/13, now on prednisone and methimazole     Mixed type age-related cataract, both eyes 4/8/2022     Morbid obesity (H)      Pulmonary embolism (H) 1/12    hospitalized in [place], on lovenox/coumadin for a few months but stopped, hypercoag w/u neg per pt     Sleep apnea     using CPAP     No past surgical history on file.  No current outpatient medications on file.    Allergies   Allergen Reactions     Penicillins Other (See Comments) and Unknown     CHILDHOOD ALLERGY  CHILDHOOD ALLERGY     Ibuprofen Hives and Rash     Ibuprofen Sodium Hives and Rash     Family History  Family History   Problem Relation Age of Onset     Thyroid Disease Mother         Grave's D     Diabetes Mother      Heart Disease Mother      Cerebrovascular Disease Mother         dec. 56 yo     Hypertension Mother      Heart Disease Sister         Heart Murmur     Diabetes Sister      Heart Disease Father         dec in his 30s, MI     Psychotic Disorder Brother         Bipolar     Diabetes Brother      Thyroid Disease Brother         Hyper Thyroid     Heart Disease Brother      Thyroid Disease Sister         Hyper Thyroid     Thyroid Disease Sister         Hyper Thyroid     Thyroid Disease Sister         Mental Health Problems     Thyroid Disease Maternal Aunt      Thyroid Disease Maternal Uncle      Cancer No family hx of      Glaucoma No family hx of      Macular Degeneration No family hx of      Social History   Social History     Tobacco Use     Smoking status: Light Smoker     Packs/day: 0.25     Years: 13.00     Pack years: 3.25     Types: Cigarettes     Smokeless tobacco: Never     Tobacco comments:     down to 2 cigs a day   Substance Use Topics     Alcohol use: No     Drug use: No      Past medical history, past surgical history, medications, allergies, family history, and social history were reviewed with the  patient. No additional pertinent items.       Review of Systems   Constitutional: Negative for chills and fever.   HENT: Negative for sore throat.    Respiratory: Negative for cough.    Cardiovascular: Positive for chest pain, palpitations and leg swelling.   Gastrointestinal: Positive for nausea and vomiting. Negative for abdominal pain.   Genitourinary: Negative for flank pain and frequency.   Musculoskeletal: Negative for back pain.   Neurological: Negative for dizziness and headaches.     A complete review of systems was performed with pertinent positives and negatives noted in the HPI, and all other systems negative.    Physical Exam   BP: 92/75  Pulse: (!) 139  Temp: 97.9  F (36.6  C)  Resp: 16  Weight: (!) 233.7 kg (515 lb 4.8 oz)  SpO2: 99 %  Physical Exam  Vitals reviewed.   Constitutional:       Appearance: She is obese.   HENT:      Head: Normocephalic.   Cardiovascular:      Rate and Rhythm: Tachycardia present. Rhythm irregular.      Heart sounds: Heart sounds are distant.   Pulmonary:      Effort: Pulmonary effort is normal.      Breath sounds: Decreased breath sounds present.   Chest:      Chest wall: Tenderness present.       Abdominal:      General: Bowel sounds are normal.      Palpations: Abdomen is soft.      Tenderness: There is no abdominal tenderness.   Musculoskeletal:      Cervical back: Normal range of motion.      Right lower le+ Edema present.      Left lower le+ Edema present.   Skin:     General: Skin is warm.   Neurological:      Mental Status: She is oriented to person, place, and time.         ED Course      Procedures         EKG Interpretation:      Interpreted by YOCASTA Rizo CNP and reviewed by Dr. Duong  Time reviewed: 1316  Symptoms at time of EKG: None   Rhythm: atrial fibrillation - rapid  Rate: Tachycardia  Axis: Right Axis Deviation  Ectopy: none  Conduction: normal  ST Segments/ T Waves: Non-specific ST-T wave changes  Q Waves: none  Comparison to prior:  Now in rapid a-fib.  Clinical Impression: atrial fibrillation with rapid response        Results for orders placed or performed during the hospital encounter of 12/25/22   XR Chest Port 1 View     Status: None (Preliminary result)    Impression    RESIDENT PRELIMINARY INTERPRETATION  IMPRESSION:  1. Bilateral interstitial and airspace opacities, concerning for  infection.  2. Cardiomegaly.   INR     Status: Abnormal   Result Value Ref Range    INR 1.36 (H) 0.85 - 1.15   Comprehensive metabolic panel     Status: Abnormal   Result Value Ref Range    Sodium 136 136 - 145 mmol/L    Potassium 4.1 3.4 - 5.3 mmol/L    Chloride 94 (L) 98 - 107 mmol/L    Carbon Dioxide (CO2) 30 (H) 22 - 29 mmol/L    Anion Gap 12 7 - 15 mmol/L    Urea Nitrogen 70.5 (H) 6.0 - 20.0 mg/dL    Creatinine 1.62 (H) 0.51 - 0.95 mg/dL    Calcium 9.0 8.6 - 10.0 mg/dL    Glucose 396 (H) 70 - 99 mg/dL    Alkaline Phosphatase 167 (H) 35 - 104 U/L    AST 32 10 - 35 U/L    ALT 40 (H) 10 - 35 U/L    Protein Total 6.7 6.4 - 8.3 g/dL    Albumin 2.9 (L) 3.5 - 5.2 g/dL    Bilirubin Total 1.5 (H) <=1.2 mg/dL    GFR Estimate 39 (L) >60 mL/min/1.73m2   Troponin T, High Sensitivity     Status: Abnormal   Result Value Ref Range    Troponin T, High Sensitivity 72 (H) <=14 ng/L   Magnesium     Status: Normal   Result Value Ref Range    Magnesium 1.8 1.7 - 2.3 mg/dL   Nt probnp inpatient (BNP)     Status: Abnormal   Result Value Ref Range    N terminal Pro BNP Inpatient 2,788 (H) 0 - 450 pg/mL   CBC with platelets and differential     Status: Abnormal   Result Value Ref Range    WBC Count 10.5 4.0 - 11.0 10e3/uL    RBC Count 5.58 (H) 3.80 - 5.20 10e6/uL    Hemoglobin 16.4 (H) 11.7 - 15.7 g/dL    Hematocrit 53.8 (H) 35.0 - 47.0 %    MCV 96 78 - 100 fL    MCH 29.4 26.5 - 33.0 pg    MCHC 30.5 (L) 31.5 - 36.5 g/dL    RDW 17.6 (H) 10.0 - 15.0 %    Platelet Count 150 150 - 450 10e3/uL    % Neutrophils 82 %    % Lymphocytes 8 %    % Monocytes 9 %    % Eosinophils 0 %    %  Basophils 0 %    % Immature Granulocytes 1 %    NRBCs per 100 WBC 0 <1 /100    Absolute Neutrophils 8.6 (H) 1.6 - 8.3 10e3/uL    Absolute Lymphocytes 0.9 0.8 - 5.3 10e3/uL    Absolute Monocytes 0.9 0.0 - 1.3 10e3/uL    Absolute Eosinophils 0.0 0.0 - 0.7 10e3/uL    Absolute Basophils 0.0 0.0 - 0.2 10e3/uL    Absolute Immature Granulocytes 0.1 <=0.4 10e3/uL    Absolute NRBCs 0.0 10e3/uL   [place] Draw *Canceled*     Status: None ()    Narrative    The following orders were created for panel order [place] Draw.  Procedure                               Abnormality         Status                     ---------                               -----------         ------                       Please view results for these tests on the individual orders.   [place] Draw     Status: None    Narrative    The following orders were created for panel order [place] Draw.  Procedure                               Abnormality         Status                     ---------                               -----------         ------                     Extra Red Top Tube[[id number]]                               Final result                 Please view results for these tests on the individual orders.   Extra Red Top Tube     Status: None   Result Value Ref Range    Hold Specimen [facility]    iStat Gases (lactate) venous, POCT     Status: Abnormal   Result Value Ref Range    Lactic Acid POCT 0.4 <=2.0 mmol/L    Bicarbonate Venous POCT 38 (H) 21 - 28 mmol/L    O2 Sat, Venous POCT 56 (L) 94 - 100 %    pCO2V Venous POCT 63 (H) 40 - 50 mm Hg    pH Venous POCT 7.39 7.32 - 7.43    pO2 Venous POCT 31 25 - 47 mm Hg   TSH with free T4 reflex     Status: Abnormal   Result Value Ref Range    TSH 14.30 (H) 0.30 - 4.20 uIU/mL   T4 free     Status: Abnormal   Result Value Ref Range    Free T4 0.53 (L) 0.90 - 1.70 ng/dL   Troponin T, High Sensitivity     Status: Abnormal   Result Value Ref Range    Troponin T, High Sensitivity 74 (H) <=14 ng/L   Asymptomatic  Influenza A/B & SARS-CoV2 (COVID-19) Virus PCR Multiplex Nasopharyngeal     Status: Normal    Specimen: Nasopharyngeal; Swab   Result Value Ref Range    Influenza A PCR Negative Negative    Influenza B PCR Negative Negative    RSV PCR Negative Negative    SARS CoV2 PCR Negative Negative    Narrative    Testing was performed using the Xpert Xpress CoV2/Flu/RSV Assay on the [employer] GeneXpert Instrument. This test should be ordered for the detection of SARS-CoV-2 and influenza viruses in individuals who meet clinical and/or epidemiological criteria. Test performance is unknown in asymptomatic patients. This test is for in vitro diagnostic use under the FDA EUA for laboratories certified under CLIA to perform high or moderate complexity testing. This test has not been FDA cleared or approved. A negative result does not rule out the presence of PCR inhibitors in the specimen or target RNA in concentration below the limit of detection for the assay. If only one viral target is positive but coinfection with multiple targets is suspected, the sample should be re-tested with another FDA cleared, approved, or authorized test, if coinfection would change clinical management. This test was validated by the [facility] [employer]. These laboratories are certified under the Clinical Laboratory Improvement Amendments of 1988 (CLIA-88) as qualified to perform high complexity laboratory testing.   Hemoglobin A1c     Status: Abnormal   Result Value Ref Range    Hemoglobin A1C 13.5 (H) <5.7 %   Extra Tube     Status: None    Narrative    The following orders were created for panel order Extra Tube.  Procedure                               Abnormality         Status                     ---------                               -----------         ------                     Extra Purple Top Tube[[id number]]                            Final result                 Please view results for these tests on the individual orders.   Extra  Purple Top Tube     Status: None   Result Value Ref Range    Hold Specimen [facility]    Troponin T, High Sensitivity     Status: Abnormal   Result Value Ref Range    Troponin T, High Sensitivity 67 (H) <=14 ng/L   Glucose by meter     Status: Abnormal   Result Value Ref Range    GLUCOSE BY METER POCT 276 (H) 70 - 99 mg/dL   EKG 12-lead, tracing only     Status: None (Preliminary result)   Result Value Ref Range    Systolic Blood Pressure  mmHg    Diastolic Blood Pressure  mmHg    Ventricular Rate 131 BPM    Atrial Rate 147 BPM    NC Interval  ms    QRS Duration 72 ms     ms    QTc 386 ms    P Axis  degrees    R AXIS 145 degrees    T Axis 19 degrees    Interpretation ECG       Atrial fibrillation with rapid ventricular response  Right axis deviation  Low voltage QRS  Cannot rule out Anterior infarct , age undetermined  Abnormal ECG     CBC with platelets differential     Status: Abnormal    Narrative    The following orders were created for panel order CBC with platelets differential.  Procedure                               Abnormality         Status                     ---------                               -----------         ------                     CBC with platelets and d...[[id number]]  Abnormal            Final result                 Please view results for these tests on the individual orders.     Medications   capsaicin (ZOSTRIX) 0.025 % cream ( Topical Given 12/25/22 1527)   metoprolol succinate ER (TOPROL XL) 24 hr tablet 50 mg (50 mg Oral Given 12/25/22 1527)   apixaban ANTICOAGULANT (ELIQUIS) tablet 5 mg (has no administration in time range)   bumetanide (BUMEX) 0.25 mg/mL infusion (0 mg/hr Intravenous ED Infusing on Admission/transfer 12/25/22 1810)   acetaminophen (TYLENOL) tablet 650 mg (has no administration in time range)   albuterol (PROVENTIL) neb solution 2.5 mg (has no administration in time range)   albuterol (PROVENTIL HFA/VENTOLIN HFA) inhaler (has no administration in time range)    atorvastatin (LIPITOR) tablet 40 mg (40 mg Oral Given 12/25/22 1810)   bisacodyl (DULCOLAX) suppository 10 mg (has no administration in time range)   empagliflozin (JARDIANCE) tablet 25 mg ( Oral Automatically Held 12/28/22 0800)   gabapentin (NEURONTIN) capsule 600 mg (has no administration in time range)   hydrALAZINE (APRESOLINE) half-tab 12.5 mg (has no administration in time range)   isosorbide dinitrate (ISORDIL) tablet 20 mg (has no administration in time range)   nicotine (NICODERM CQ) 14 MG/24HR 24 hr patch 1 patch (1 patch Transdermal Not Given 12/25/22 1849)     And   nicotine Patch in Place ( Transdermal Patch Free Period 12/25/22 1849)   oxyCODONE-acetaminophen (PERCOCET) 5-325 MG per tablet 1 tablet (has no administration in time range)   polyethylene glycol (MIRALAX) Packet 17 g (17 g Oral Not Given 12/25/22 1825)   sennosides (SENOKOT) tablet 1 tablet (has no administration in time range)   umeclidinium (INCRUSE ELLIPTA) 62.5 MCG/ACT inhaler 1 puff (has no administration in time range)   levothyroxine (SYNTHROID/LEVOTHROID) tablet 200 mcg (has no administration in time range)   - MEDICATION INSTRUCTIONS - (has no administration in time range)   Patient is already receiving anticoagulation with heparin, enoxaparin (LOVENOX), warfarin (COUMADIN)  or other anticoagulant medication (has no administration in time range)   glucose gel 15-30 g (has no administration in time range)     Or   dextrose 50 % injection 25-50 mL (has no administration in time range)     Or   glucagon injection 1 mg (has no administration in time range)   insulin aspart (NovoLOG) injection (RAPID ACTING) (1 Units Subcutaneous Not Given 12/25/22 1919)   insulin aspart (NovoLOG) injection (RAPID ACTING) (has no administration in time range)   insulin glargine (LANTUS PEN) injection 50 Units (has no administration in time range)   capsaicin (ZOSTRIX) 0.025 % cream 1 g (has no administration in time range)   magnesium oxide (MAG-OX)  tablet 400 mg (400 mg Oral Given 12/25/22 1811)   insulin aspart (NovoLOG) injection (RAPID ACTING) (has no administration in time range)   insulin aspart (NovoLOG) injection (RAPID ACTING) (has no administration in time range)   insulin aspart (NovoLOG) injection (RAPID ACTING) (has no administration in time range)   insulin glargine (LANTUS PEN) injection 60 Units (has no administration in time range)   furosemide (LASIX) injection 80 mg (80 mg Intravenous Given 12/25/22 1423)        Assessments & Plan (with Medical Decision Making)   47 year old female w/ PMH notable for obesity, asthma, DM2, HTN, persistent atrial fibrillation - on Eliquis, chronic HFrEF, NICM (EF 20% by echo 9/2022), normal coronary angiography 1/2012, Graves dz s/p radioiodine ablation - now hypothyroid, JYOTI, CKD.    On examination patient with bilateral lower extremity pitting edema she reports has worsened today. Lungs sounds are diminished without crackles or rhonchi. She is in atrial fibrillation with a rapid ventricular rate.     Her CBC showed not leukocytosis, or anemia, INR was 1.36. CMP was notable for normal sodium and potassium, and elevated BUN, and alkaline phosphatase.  Initial troponin 72, 2-hour delta troponin 74.  BNP was slightly elevated at 2700.  Preliminary radiology report of her chest x-ray showed interstitial and airspace opacities and cardiomegaly.  Also possible airspace opacities representing infection. With patient denying cough, fevers, without leukocytosis at this time defer on antibiotics for possible lung opacities.    With her ECG showing no S-T elevation, in the setting of decompensated heart failure her elevated troponins are unlikely to represent ACS more likely demand.     As patient has a documented EF of 10 to 15% from a recent echo, dated initially hold off on antiarrhythmics over concern for decompensation.  With her hypervolemia initially did order 80 mg of IV Lasix.  Patient reported some discomfort  in her swollen feet, did order her home with capsaicin cream.  As patient had been off of her home antiarrhythmic, I did order her home dosing of 50 mg extended release metoprolol.    Patient had no acute issues or apparent concerning changes in vitals or clinical appearance, but with concern for decompensated heart failure she will need to be admitted. With a recent ECHO showing an EF of <15% I did speak to Cardiology 2 staff. I reviewed patient and presentation, they will admit for further evaluation/management.    I reviewed the available findings, plan for admission with patient. After completion of care patient was transferred to inpatient unit.    I have reviewed the nursing notes. I have reviewed the findings, diagnosis, plan and need for follow up with the patient.    Current Discharge Medication List          Final diagnoses:   Acute on chronic HFrEF (heart failure with reduced ejection fraction) (H)   Leg swelling   Atrial fibrillation with rapid ventricular response (H)   Hypervolemia due to congestive heart failure (H)       --  YOCASTA Rizo CNP  [facility] EMERGENCY DEPARTMENT  12/25/2022     Bonifacio Kebede APRN CNP  12/25/22 1941      --    --    ED Attending Physician Attestation    I Caitie Duong MD, cared for this patient with the Advanced Practice Provider (ADALBERTO). I have performed a history and physical examination of the patient independent of the ADALBERTO. I reviewed the ADALBERTO's documentation above and agree with the documented findings and plan of care. I personally provided a substantive portion of the care for this patient.    I personally performed the substantive portion of the medical decision making for this visit - please see the ADALBERTO's documentation for full details.    Key management decisions made by me and carried out under my direction: give IV lasix and home BP meds for rate control. Obtain weight. Hold empiric abx as clinical picture c/w CHF and not infection. Admit  to cards 2 for ongoing diuresis.       I personally performed the substantive portion of the history for this visit - please see the ADALBERTO's documentation for full details.    I personally performed the substantive portion of the physical exam - please see the ADALBERTO's documentation for full details.      Summary of HPI, PE, ED Course   Patient is a 47 year old female evaluated in the emergency department for CP and SOB after noncompliance with her cardiac meds. Exam notable for morbidly obese female with increased WOB, tachcyardia, afib with RVR. Soft BP. ED course notable for Eval c/w acute CHF exacerbation and afib with RVR. Treated with lasix with no UOP. HR generally below 130 so given usual PO rate controlling meds and will continue to diurese. Cards 2 admitted and started bumex drip. After the completion of care in the emergency department, the patient was admitted to inpatient.    Critical Care & Procedures  Not applicable.    Medical Decision Making  The medical record was reviewed and interpreted.  Current labs reviewed and interpreted.  Previous labs reviewed and interpreted.  Current images reviewed and interpreted: CXR c/w edema.  EKG reviewed and interpreted: afib with rvr.  Managed outpatient prescription medications.      Caitie Duong MD  Emergency Medicine              Caitie Duong MD  12/25/22 2011

## 2022-12-25 NOTE — ED TRIAGE NOTES
"Patient BIBA from home with complaints of intermittent chest pain since 8pm last evening. Associated symptoms of shortness of breath, \"heart beating fast\" and nausea. History of a.fib and DM2.   "

## 2022-12-25 NOTE — H&P
[facility]   Cardiology II Service / Advanced Heart Failure  History & Physical    Pricilla Brown  : 1975  MRN # [id number]  ADMIT DATE: 2022  PCP: Lenore Roberts MD    ASSESSMENT & PLAN:  Pricilla Brown is a 47 year old female w/ PMHx significant for NICM (EF 40-45%), paroxysmal afib (on Eliquis), ID-T2DM, HTN, JYOTI, neuropathy, morbid obesity who presented to the ED on 2022 due to chest discomfort, palpitations, volume overload concerning for heart failure exacerbation and afib w/ RVR.    ACUTE ISSUES:  Acute on chronic HFrEF (EF 40-45%) 2/2 NICM (Stage C, NYHA Class IIII)  Patient presents with sxs of volume overload, including SOB, MURRAY, PND, orthopnea, increased abdominal girth, 3+ BLE pitting edema. NTproBNP 2788. CXR w/ pulmonary edema. Suspect 2/2 dietary and medication noncompliance vs. afib w/ RVR. Unclear dry weight, although weight at discharge from prior hospital admission (10/1/2022) was 462 lbs. Admission weight 515 lbs.  - Diuresis: Bumex 3mg/hr (PTA Bumex 5mg BID)   - Goal net negative 3-5L/day  - GDMT              - ACEi/ARB/ARNi: continue PTA hydralazine 12.5mg TID, Isordil 20mg TID               - Aldosterone Inhibitor: hold PTA spironolactone 50mg daily              - Beta blocker: continue PTA metoprolol succinate 50mg daily               - SGLT2i: hold PTA empagliflozin  - SCD: none  - Will consider repeat TTE once HR more controlled (most recent in 2022)  - Telemetry  - Replete K > 4, Mg > 2 (RN repletion protocol ordered)  - Strict I/O  - Daily weights  - Na (2g) and fluid (<2L/d) restricted diet    Atrial fibrillation w/ RVR  [id number] 4. Patient has not been taking PTA metoprolol for a few days.  - Continue PTA apixaban 5mg BID  - Continue PTA metoprolol succinate 50mg daily  - If rates are uncontrolled with metoprolol, will start amiodarone gtt    Elevated troponin 2/2 AoC HF and afib w/ RVR  Low suspicion for ACS given resolution of chest  pain and no ischemic changes seen on EKG. Chest pain lasts for seconds then dissipates and occurs with palpitations, likely representing discomfort from significant volume overload.  - Trend troponin to peak     CHRONIC ISSUES  Insulin-dependent T2DM, uncontrolled c/b neuropathy  A1c > 15% (7/2021). Patient does not take insulin consistently at home. Unclear if insulin doses listed in home med list are accurate; will start with lower doses similar to those at last hospitalization. Likely will need to uptitrate insulin during admission.  - Repeat A1c ordered  - 60U glargine in AM (will consider BID)  - 20U aspart TID w/ meals  - High dose aspart SSI  - Hold PTA insulin regimen: glargine 225U in AM; aspart 100U TID  - Hold PTA semaglutide  - Outpatient follow up with endocrinology    CKD III  Cr 1.62 on admission. Baseline Cr approximately 1.3-1.4.  - Trend Cr  - Avoid nephrotoxins    Hypothyroidism, uncontrolled  TSH 14.3 and fT4 0.53 on admission. Patient states that she has not taken PTA levothyroxine in > 1 week.  - Continue PTA levothyroxine 200mcg daily (400mcg on Sundays)    HTN  - Hold PTA hydrochlorothiazide 12.5mg BID    COPD  - Continue PTA inhalers    JYOTI  - CPAP ordered    Diet: Cardiac and carb control  DVT ppx: Eliquis  Code Status: Full (confirmed with patient on admission)  Discharge plan: Inpatient admission for further diuresis and HR control    Patient was discussed with attending physician, Dr. Walters.    Madeline Vasquez MD  Internal Medicine, PGY-1        CHIEF COMPLAINT:   Chest discomfort, palpitations, SOB, lower extremity edema    HPI:   Pricilla Brown is a 47 year old female w/ PMHx significant for NICM (EF 40-45%), paroxysmal afib (on Eliquis), ID-T2DM, HTN, JYOTI, neuropathy, morbid obesity who presented to the ED on 12/25/2022 due to chest discomfort, palpitations, volume overload concerning for heart failure exacerbation and afib w/ RVR. Patient states that she developed increasing  palpitations and chest discomfort yesterday. Feels like heart is beating harder and faster than normal. Notes that she intermittently gets mid-sternal chest discomfort, although not currently. No radiation. Feels tight. No change with exertion, position, inhalation/exhalation. Lasts for a few seconds only while having palpitations. No trigger. Patient also notes worsening SOB, MURRAY, exercise capacity (gets SOB with ambulation to bathroom, unable to walk up flight of stairs), PND, orthopnea, LE edema, abdominal fullness for the past 3-4 days. Has not been taking her PTA medications on a regular basis, and has not taken any for the past few days while family has been visiting. Sleeps in chair or with 4-5 pillows. Does not follow low-salt diet. Reports lower PO intake. Has not used insulin or checked blood sugar. Does not know dry weight.    Endorses intermittent nausea. Denies fever, chills, vomiting, headache, vision changes, cough, rhinorrhea, abdominal pain, diarrhea, constipation, urinary sxs.    Has not smoked cigarettes for past week, but previously smoked 3-5 cigarettes/day. Denies alcohol, drug, supplement use. Lives in apartment with caregiver, who assists with ADLs and IADLs. Does not work currently. Ambulates w/ walker.    ED Course:  Vitals: -130s, /80s, SpO2 mid 90s on RA  Labs: Cr 1.62, alk phos 167, Tbili 1.5, glucose 396, NTproBNP 2788, TSH 14.3, fT4 0.53, VBG 7.39/63  EKG: afib w/ RVR (), low voltage, RAD  Imaging: CXR pending  Interventions: furosemide 80mg IV, metoprolol succinate 50mg, capsaicin cream   Patient admitted to hospital for further management    ROS:   12-pt ROS otherwise negative except as noted above    PMH:  Past Medical History:   Diagnosis Date     A-fib (H) 2011    on coumadin since 1/13     Asthma     as a kid     Chest pain 2/1/2017     Chronic anticoagulation for a-fib 2/15/2013    INR's followed by coumadin clinic at      Diabetes mellitus (H) 2012      Diastolic heart failure 2/15/2013     Dilated cardiomyopathy (H) 2013     HTN (hypertension)      Hyperthyroidism     Graves, s/p I131 , now on prednisone and methimazole     Mixed type age-related cataract, both eyes 2022     Morbid obesity (H)      Pulmonary embolism (H)     hospitalized in [place], on lovenox/coumadin for a few months but stopped, hypercoag w/u neg per pt     Sleep apnea     using CPAP       PSH:  No past surgical history on file.    MEDICATIONS:  Prior to Admission Medications   Prescriptions Last Dose Informant Patient Reported? Taking?   ACCU-CHEK RON PLUS test strip   No No   Sig: USE TO TEST BLOOD SUGAR FOUR TIMES A DAY  OR AS DIRECTED.   Blood Pressure KIT   No No   Sig: Use to check blood pressure as directed, once daily and as needed. Record results.   Continuous Blood Gluc Sensor (FREESTYLE BELLO 14 DAY SENSOR) [facility]   No No   Si each every 14 days   Continuous Blood Gluc Sensor (FREESTYLE BELLO 2 SENSOR) [facility] [place]   No No   Si Units every 14 days Check BG 4 times daily before meals and at bedtime.   DULoxetine (CYMBALTA) 20 MG capsule   No No   Sig: Take 1 capsule (20 mg) by mouth 2 times daily   Patient not taking: Reported on 10/26/2022   Efinaconazole 10 % SOLN   No No   Sig: Externally apply topically daily To toenails.   HUMULIN R U-500 KWIKPEN 500 UNIT/ML PEN soln   No No   Sig: INJECT 200 UNITS UNDER THE SKIN 30 TO 60 MINUTES BEFORE BREAKFAST AND INJECT 150 UNITS UNDER THE SKIN BEFORE LUNCH AND DINNER (MAX DOSE OF: 750 UNITS)   ORDER FOR DME  Self No No   Sig: Use your CPAP device as directed by your provider. Pressure change to min 13 max 18cwp   Respiratory Therapy Supplies (NEBULIZER COMPRESSOR) KIT   No No   Si Device 4 times daily as needed.   Semaglutide, 1 MG/DOSE, 4 MG/3ML SOPN   No No   Sig: Inject 1 mg Subcutaneous once a week   acetaminophen (TYLENOL) 325 MG tablet   No No   Sig: Take 2 tablets (650 mg) by mouth 3 times daily as needed for mild  pain or fever (total acetaminophen dose should not exceed 3000 mg per day)   albuterol (PROVENTIL) (2.5 MG/3ML) 0.083% neb solution   No No   Sig: Take 1 vial (2.5 mg) by nebulization every 6 hours as needed for shortness of breath / dyspnea or wheezing   albuterol (VENTOLIN HFA) 108 (90 Base) MCG/ACT inhaler   No No   Sig: Inhale 2 puffs into the lungs every 6 hours as needed for shortness of breath / dyspnea   apixaban ANTICOAGULANT (ELIQUIS) 5 MG tablet   No No   Sig: Take 1 tablet (5 mg) by mouth 2 times daily   atorvastatin (LIPITOR) 40 MG tablet   No No   Sig: Take 1 tablet (40 mg) by mouth daily   bisacodyl (DULCOLAX) 10 MG suppository  Self No No   Sig: Place 1 suppository (10 mg) rectally daily as needed for constipation   blood glucose (NO BRAND SPECIFIED) lancets standard   No No   Sig: USE TO CHECK  blood sugar fasting each am  prelunch and predinner daily OR AS DIRECTED Call clinic to schedule MD APPT.   blood glucose (NO BRAND SPECIFIED) test strip   No No   Sig: Use to test blood sugars 3 times daily or as directed   blood glucose (NO BRAND SPECIFIED) test strip   No No   Sig: Use to test blood sugar 4 times daily or as directed.   blood glucose (NO BRAND SPECIFIED) test strip   No No   Sig: Use to test blood sugar 4 times daily or as directed. Accu check Sangeeta plus   blood glucose monitoring (ACCU-CHEK FASTCLIX) lancets   No No   Sig: Use to test blood sugar 4 times daily or as directed.or lancets that go with meter being used   blood glucose monitoring (IBG STAR) meter device kit   No No   Sig: -PLEASE GIVE PATIENT A DEVICE HER INSURANCE WILL COVER-   blood glucose monitoring (NO BRAND SPECIFIED) meter device kit   No No   Sig: Use to test blood sugar 3 times daily or as directed.   blood glucose monitoring (NO BRAND SPECIFIED) meter device kit   No No   Sig: Use to test blood sugar 4  times daily or as directed.   bumetanide (BUMEX) 1 MG tablet   No No   Sig: Take 4 tablets (4 mg) by mouth 2 times  daily   capsaicin (ZOSTRIX) 0.025 % external cream   No No   Sig: Apply 1 g topically 3 times daily as needed (use for neuropathy pain)   ciclopirox (LOPROX) 0.77 % cream   No No   Sig: Apply topically 2 times daily To feet and toenails.   ciclopirox (PENLAC) 8 % external solution   No No   Sig: Apply topically daily To toenails.   clotrimazole (LOTRIMIN) 1 % external cream   No No   Sig: Apply topically 2 times daily as needed (skin irritation)   colchicine (COLCYRS) 0.6 MG tablet   No No   Sig: Take 1-2 tablets (0.6-1.2 mg) by mouth daily as needed for moderate pain   colchicine (COLCYRS) 0.6 MG tablet   No No   Sig: Take 1 tablet (0.6 mg) by mouth 2 times daily   Patient not taking: Reported on 10/26/2022   diclofenac (VOLTAREN) 1 % topical gel   No No   Sig: Apply 4 grams to knees or 2 grams to hands four times daily using enclosed dosing card.   empagliflozin (JARDIANCE) 25 MG TABS tablet   No No   Sig: Take 1 tablet (25 mg) by mouth daily   gabapentin (NEURONTIN) 300 MG capsule   No No   Sig: Take 2 capsules (600 mg) by mouth 3 times daily   hydrALAZINE (APRESOLINE) 25 MG tablet   No No   Sig: Take 3 tablets (75 mg) by mouth 3 times daily   hydrALAZINE (APRESOLINE) 25 MG tablet   No No   Sig: Take 0.5 tablets (12.5 mg) by mouth 3 times daily   hydrochlorothiazide (HYDRODIURIL) 12.5 MG tablet   No No   Sig: Take 1 tablet (12.5 mg) by mouth 2 times daily   insulin aspart (NOVOLOG FLEXPEN) 100 UNIT/ML pen   No No   Sig: INJECT 100  UNITS UNDER THE SKIN WITH MEALS,PLUS CORRECTION.PT USES APPROX 325 UNITS/24 HRS.   insulin glargine U-300 (TOUJEO MAX SOLOSTAR) 300 UNIT/ML (2 units dial) pen   No No   Sig: Inject subcu Toujeo 225 units in the morning   insulin pen needle (BD RIVER U/F) 32G X 4 MM miscellaneous   No No   Sig: Use 6 daily or as directed   isosorbide dinitrate (ISORDIL) 20 MG tablet   No No   Sig: Take 1 tablet (20 mg) by mouth 3 times daily   ketorolac (ACULAR) 0.5 % ophthalmic solution   No No   Sig:  Place 1 Drop into right eye 3 times daily.   levothyroxine (SYNTHROID/LEVOTHROID) 200 MCG tablet   No No   Sig: Take one tablet (200mg) by mouth daily Monday through Friday, and take two tablets (400mcg) on  and Sundays   metoprolol succinate ER (TOPROL XL) 50 MG 24 hr tablet   No No   Sig: Take 1 tablet (50 mg) by mouth daily   nicotine (NICODERM CQ) 14 MG/24HR 24 hr patch   No No   Sig: Place 1 patch onto the skin every 24 hours   nystatin (MYCOSTATIN) 092106 UNIT/GM external cream   No No   Sig: Apply topically 2 times daily To toenails   order for DME   No No   Sig: Equipment being ordered: Challenger Wide walker if available - patient needs seat, basket and brakes.   order for DME   No No   Sig: Equipment being ordered: [employer] [id number] $92   Plantar, fasciitis, LG, night splint   order for DME   No No   Sig: Left foot   oxyCODONE-acetaminophen (PERCOCET) 5-325 MG per tablet  Self No No   Sig: Take 1 tablet by mouth every 8 hours as needed for moderate to severe pain (try to limit use, no further prescriptions until seen in pain clinic)   polyethylene glycol (MIRALAX/GLYCOLAX) powder  Self Yes No   potassium chloride ER (KLOR-CON M) 20 MEQ CR tablet   No No   Sig: TAKE TWO TABLETS BY MOUTH FOUR TIMES A DAY   prednisoLONE acetate (PRED FORTE) 1 % ophthalmic suspension   No No   Sig: As directed 1 Drop 3 times daily. Follow physician instructions for the operative eye.   senna (SENOKOT) 8.6 MG tablet  Self No No   Sig: Take 1 tablet by mouth 2 times daily   tiotropium (SPIRIVA HANDIHALER) 18 MCG inhalation capsule  Self No No   Sig: Inhale contents of one capsule daily.   topiramate (TOPAMAX) 25 MG tablet   No No   Si mg at bedtime for 1 week, 50 mg at bedtime for 1 week and 75 mg daily at bedtime thereafter      Facility-Administered Medications: None        ALLERGIES:     Allergies   Allergen Reactions     Penicillins Other (See Comments) and Unknown     CHILDHOOD ALLERGY  CHILDHOOD ALLERGY      Ibuprofen Hives and Rash     Ibuprofen Sodium Hives and Rash       FAMILY HISTORY:  Family History   Problem Relation Age of Onset     Thyroid Disease Mother         Grave's D     Diabetes Mother      Heart Disease Mother      Cerebrovascular Disease Mother         dec. 54 yo     Hypertension Mother      Heart Disease Sister         Heart Murmur     Diabetes Sister      Heart Disease Father         dec in his 30s, MI     Psychotic Disorder Brother         Bipolar     Diabetes Brother      Thyroid Disease Brother         Hyper Thyroid     Heart Disease Brother      Thyroid Disease Sister         Hyper Thyroid     Thyroid Disease Sister         Hyper Thyroid     Thyroid Disease Sister         Mental Health Problems     Thyroid Disease Maternal Aunt      Thyroid Disease Maternal Uncle      Cancer No family hx of      Glaucoma No family hx of      Macular Degeneration No family hx of        SOCIAL HISTORY:  Social History     Socioeconomic History     Marital status: Single     Spouse name: Not on file     Number of children: Not on file     Years of education: Not on file     Highest education level: Not on file   Occupational History     Not on file   Tobacco Use     Smoking status: Light Smoker     Packs/day: 0.25     Years: 13.00     Pack years: 3.25     Types: Cigarettes     Smokeless tobacco: Never     Tobacco comments:     down to 2 cigs a day   Substance and Sexual Activity     Alcohol use: No     Drug use: No     Sexual activity: Yes     Partners: Male     Birth control/protection: Condom   Other Topics Concern     Parent/sibling w/ CABG, MI or angioplasty before 65F 55M? Not Asked   Social History Narrative    Single, no children        Gyn:        Last pap several years ago, no abnormal    No STIs            Patient is single.  She is no longer working.  She used to work in the area of customer service.  She is currently living with her sister in [place], [place].  She has no pets.  Patient has  smoked a half pack of cigarettes a day for the past 10 plus years.  She states that she is down to 5 cigarettes a day with the aid of Wellbutrin.  She does not smoke cigars, pipes or chew tobacco.  She has 1 cup of coffee in the morning.  She does not drink alcohol.  Patient does not exercise.      Social Determinants of Health     Financial Resource Strain: Not on file   Food Insecurity: Not on file   Transportation Needs: Not on file   Physical Activity: Not on file   Stress: Not on file   Social Connections: Not on file   Intimate Partner Violence: Not on file   Housing Stability: Not on file       PHYSICAL EXAM:  Blood pressure (!) 138/96, pulse (!) 123, resp. rate 16, weight (!) 233.7 kg (515 lb 4.8 oz), SpO2 95 %, not currently breastfeeding.  GENERAL: No acute distress.  HEENT: Bilateral eye erythema. EOMI.  NECK: Supple. JVP approximately 14cm.  CV: S1/S2 heard without murmur. Distant heart sounds. Chest discomfort reproducible with palpation.  RESPIRATORY: Basilar crackles. No wheezing.  GI: Soft and non-distended with normoactive bowel sounds present in all quadrants. No tenderness, rebound, guarding.  EXTREMITIES: 3+ BLE edema.  NEUROLOGIC: Alert and orientated x 3. CN II-XII grossly intact. No focal deficits.  SKIN: No jaundice. No acute rashes or lesions.     LABS:  BMP  Recent Labs   Lab 12/25/22  1327      POTASSIUM 4.1   CHLORIDE 94*   CO2 30*   BUN 70.5*   CR 1.62*   *   JUSTIN 9.0     CBC  Recent Labs   Lab 12/25/22  1327   WBC 10.5   HGB 16.4*   HCT 53.8*   MCV 96      LYMPH 8     INR  Recent Labs   Lab 12/25/22  1327   INR 1.36*     LFTs  Recent Labs   Lab 12/25/22  1327   ALKPHOS 167*   AST 32   ALT 40*   BILITOTAL 1.5*   PROTTOTAL 6.7   ALBUMIN 2.9*      INFNo lab results found in last 7 days.    IMAGING:    Results for orders placed or performed during the hospital encounter of 12/25/22   XR Chest Port 1 View    Impression    RESIDENT PRELIMINARY  INTERPRETATION  IMPRESSION:  1. Bilateral interstitial and airspace opacities, concerning for  infection.  2. Cardiomegaly.     *Note: Due to a large number of results and/or encounters for the requested time period, some results have not been displayed. A complete set of results can be found in Results Review.     TTE (6/14/2022)  Interpretation Summary  Technically difficult study.Extremely poor acoustic windows.  Limited information was obtained during study.  Left ventricular size is normal.  The visual ejection fraction is 40-45%.  Right ventricular function cannot be assessed due to poor image quality.

## 2022-12-26 NOTE — PLAN OF CARE
Care from: 0700 - 1930    Neuro: A&Ox4. Pleasant and cooperative  Cardiac: VSS., Afib with RVR rates in 110s - 120s. Afebrile  Respiratory: Sating >92% RA for the most part, can dip to 86-88% when sleeping. CPAP @ HS. Denies SOB/chest pains  GI/: Adequate urine output through external urinary cath. Last BM 12/24  Diet/appetite: Tolerating consistent carb, 2g NA, low sat fats diet. t. Eating well.  Activity:  Assist of 2-3 to reposition with lift/slide sheet. Per pt report and OT, pt and can get around with walker ax1.  Pain: C/o back and feet pain. PRN oxy q8 x2, Tylenol x1  Skin: Edematous lower extremities, Lymph consulted.  LDA's: 2 L PIV, 1 PIV infusing Bumex drip 4mg/hr (16ml/hr)  POCT: BG ACHS with high-resistance sliding scale & Carb coverage insulin    Plan: Continue to monitor Pt status and report changes to Card 2 treatment team. Encourage activity and getting up.

## 2022-12-26 NOTE — PROGRESS NOTES
[facility]   Cardiology II Service / Advanced Heart Failure  Daily Progress Note  Date of Service: 12/26/2022    Patient: Pricilla Brown  MRN: [id number]  Admission Date: 12/25/2022  Hospital Day # 1    Assessment and Plan:  Pricilla Brown is a 47 year old female w/ PMHx significant for NICM (EF 40-45%), paroxysmal afib (on Eliquis), ID-T2DM, HTN, JYOTI, neuropathy, morbid obesity who presented to the ED on 12/25/2022 due to chest discomfort, palpitations, volume overload concerning for heart failure exacerbation and afib w/ RVR.    Changes Today:  - Metolazone 10mg, then Bumex bolus 5mg IV  - Increased Bumex gtt to 4mg/hr (will decrease pending UOP)  - Goal net negative 3-5L/day  - If sustained HR > 130s, will start amiodarone gtt  - Adjusted insulin regimen (no Toujelo per pharmacy)  - Endocrinology consulted    Acute Issues:  Acute on chronic HFrEF (EF 40-45%) 2/2 NICM (Stage C, NYHA Class IIII)  Patient presents with sxs of volume overload, including SOB, MURRAY, PND, orthopnea, increased abdominal girth, 3+ BLE pitting edema. NTproBNP 2788. CXR w/ pulmonary edema. Suspect 2/2 dietary and medication noncompliance vs. afib w/ RVR. Unclear dry weight, although weight at discharge from prior hospital admission (10/1/2022) was 462 lbs. Admission weight 515 lbs.  - Diuresis: metolazone 10mg, then Bumex bolus 5mg IV; increase Bumex gtt to 4mg/hr   - Goal net negative 3-5L/day   - Likely Diuril 1g on 12/27 if inadequate UOP   - Hold PTA Bumex 5mg BID  - GDMT              - ACEi/ARB/ARNi: continue PTA hydralazine 12.5mg TID, Isordil 20mg TID               - Aldosterone Inhibitor: continue PTA spironolactone 50mg daily              - Beta blocker: continue PTA metoprolol succinate 50mg daily               - SGLT2i: continue PTA empagliflozin 25mg daily  - SCD: none  - Will consider repeat TTE once HR more controlled (most recent in 6/2022)  - Telemetry  - Replete K > 4, Mg > 2 (RN repletion protocol  ordered)  - Strict I/O  - Daily weights  - Na (2g) and fluid (<2L/d) restricted diet     Atrial fibrillation w/ RVR  [id number] 4. Patient has not been taking PTA metoprolol for a few days.  - Continue PTA apixaban 5mg BID  - Continue PTA metoprolol succinate 50mg daily  - If sustained HR > 130s, will start amiodarone gtt     Chronic/Resolved Issues:  Insulin-dependent T2DM, uncontrolled c/b neuropathy  A1c 13.5%. Patient does not take insulin consistently at home. Unclear if insulin doses listed in home med list are accurate; will start with lower doses similar to those at last hospitalization. Likely will need to uptitrate insulin during admission.  - Endocrinology consulted; recs appreciated  - 100U glargine in AM  - 80U aspart TID w/ meals  - High dose aspart SSI  - Hold PTA insulin regimen: glargine (Toujeo) 225U in AM; aspart 100U TID  - Hold PTA semaglutide    Elevated troponin 2/2 AoC HF and afib w/ RVR, peaked  Troponin 72 > 74 > 67. Low suspicion for ACS given resolution of chest pain and no ischemic changes seen on EKG. Chest pain lasts for seconds then dissipates and occurs with palpitations, likely representing discomfort from significant volume overload.    CKD III  Cr 1.62 on admission. Baseline Cr approximately 1.3-1.4.  - Trend Cr  - Avoid nephrotoxins    Hypothyroidism, uncontrolled  TSH 14.3 and fT4 0.53 on admission. Patient states that she has not taken PTA levothyroxine in > 1 week.  - Continue PTA levothyroxine 200mcg daily (400mcg on Sundays)     HTN  - Hold PTA hydrochlorothiazide 12.5mg BID     COPD  - Continue PTA inhalers     JYOTI  - CPAP ordered     Diet: Cardiac and carb control  DVT ppx: Eliquis  Code Status: Full (confirmed with patient on admission)  Discharge plan: Inpatient admission for further diuresis and HR control     Patient was discussed with attending physician, Dr. Walters.     Madeline Vasquez MD  Internal Medicine,  PGY-1    ================================================================    Interval History:   No acute events overnight. Nursing notes reviewed. Blood glucose in 500s last night. Patient continues to endorse SOB and lower extremity tightness. Feels faster than usual heartbeat. Does not feel like she urinated much overnight. Denies chest pain, lightheadedness, pre-syncope.    Medications: Reviewed.     Physical Exam:   Temp:  [96.7  F (35.9  C)-97.9  F (36.6  C)] 97.4  F (36.3  C)  Pulse:  [] 103  Resp:  [16-18] 18  BP: ()/() 127/118  Cuff Mean (mmHg):  [97] 97  SpO2:  [67 %-100 %] 96 %  GENERAL: No acute distress.  HEENT: Bilateral eye erythema. EOMI.  NECK: Supple. JVP approximately 14cm.  CV: S1/S2 heard without murmur. Distant heart sounds.  RESPIRATORY: Basilar crackles. No wheezing.  GI: Soft and non-distended with normoactive bowel sounds present in all quadrants. No tenderness, rebound, guarding.  EXTREMITIES: 3+ BLE edema.  NEUROLOGIC: Alert and orientated x 3. CN II-XII grossly intact. No focal deficits.  SKIN: No jaundice. No acute rashes or lesions.    Vitals:    12/25/22 1440 12/26/22 0700   Weight: (!) 233.7 kg (515 lb 4.8 oz) (!) 224 kg (493 lb 13.3 oz)        I/O last 3 completed shifts:  In: 1480 [P.O.:1480]  Out: 1300 [Urine:1300]    Peripheral IV 12/25/22 Anterior;Right Upper forearm (Active)   Site Assessment WDL 12/26/22 0800   Line Status Infusing 12/26/22 0800   Phlebitis Scale 0-->no symptoms 12/26/22 0800   Infiltration Scale 0 12/26/22 0800   Infiltration Site Treatment Method  Warm compress 12/26/22 0200   Number of days: 1       Peripheral IV Anterior;Left (Active)   Site Assessment WDL 12/26/22 0800   Line Status Saline locked 12/26/22 0800   Dressing Intervention New dressing  12/25/22 1651   Phlebitis Scale 0-->no symptoms 12/26/22 0800   Infiltration Scale 0 12/26/22 0800   Number of days:        Peripheral IV 12/26/22 Anterior;Left Upper forearm (Active)   Number  of days: 0       External Urinary Catheter (Active)   Skin no redness;no breakdown 12/26/22 0200   Urine Color Yellow 12/26/22 0200   Urine Appearance Clear 12/26/22 0200   Collection Container Standard 12/26/22 0200   External Catheter changed Done 12/26/22 0200   Number of days: 0     Labs:  ABG:  Lab Results   Component Value Date    PH 7.39 12/25/2022          Lab Results   Component Value Date     12/26/2022     12/25/2022     10/31/2022     06/03/2021     08/19/2020     10/29/2019    Lab Results   Component Value Date    CHLORIDE 95 12/26/2022    CHLORIDE 94 12/25/2022    CHLORIDE 98 10/31/2022    CHLORIDE 103 06/15/2022    CHLORIDE 101 10/20/2021    CHLORIDE 98 09/09/2021    CHLORIDE 98 06/03/2021    CHLORIDE 95 08/19/2020    CHLORIDE 102 10/29/2019    Lab Results   Component Value Date    BUN 64.2 12/26/2022    BUN 70.5 12/25/2022    BUN 44.8 10/31/2022    BUN 17 06/15/2022    BUN 14 10/20/2021    BUN 18 09/09/2021    BUN 13 06/03/2021    BUN 14 08/19/2020    BUN 14 10/29/2019      Lab Results   Component Value Date    POTASSIUM 4.0 12/26/2022    POTASSIUM 4.1 12/25/2022    POTASSIUM 3.5 10/31/2022    POTASSIUM 3.5 06/15/2022    POTASSIUM 4.3 10/20/2021    POTASSIUM 3.7 09/09/2021    POTASSIUM 4.2 06/03/2021    POTASSIUM 4.4 08/19/2020    POTASSIUM 3.6 10/29/2019    Lab Results   Component Value Date    CO2 29 12/26/2022    CO2 30 12/25/2022    CO2 31 10/31/2022    CO2 29 06/15/2022    CO2 30 10/20/2021    CO2 28 09/09/2021    CO2 25 06/03/2021    CO2 26 08/19/2020    CO2 30 10/29/2019    Lab Results   Component Value Date    CR 1.47 12/26/2022    CR 1.62 12/25/2022    CR 1.58 10/31/2022    CR 0.89 06/03/2021    CR 1.00 08/19/2020    CR 1.25 10/29/2019        Lab Results   Component Value Date    WBC 9.0 12/26/2022    WBC 10.5 12/25/2022    WBC 8.9 10/26/2022    HGB 15.1 12/26/2022    HGB 16.4 (H) 12/25/2022    HGB 15.2 10/26/2022    HCT 50.3 (H) 12/26/2022    HCT 53.8 (H)  12/25/2022    HCT 48.2 (H) 10/26/2022    MCV 97 12/26/2022    MCV 96 12/25/2022    MCV 95 10/26/2022     (L) 12/26/2022     12/25/2022     10/26/2022     Lab Results   Component Value Date    AST 22 12/26/2022    AST 32 12/25/2022    AST 23 10/26/2022    ALT 33 12/26/2022    ALT 40 (H) 12/25/2022    ALT 39 (H) 10/26/2022    ALKPHOS 142 (H) 12/26/2022    ALKPHOS 167 (H) 12/25/2022    ALKPHOS 103 10/26/2022    BILITOTAL 1.3 (H) 12/26/2022    BILITOTAL 1.5 (H) 12/25/2022    BILITOTAL 0.7 10/26/2022    BILICONJ 0.0 10/17/2013     Lab Results   Component Value Date    INR 1.36 (H) 12/25/2022    INR 0.99 08/19/2020    INR 1.94 (H) 10/29/2019

## 2022-12-26 NOTE — PROVIDER NOTIFICATION
Time of notification: 10:15 PM  Provider notified: DALJIT CLAUDIO  Orders received: recheck in an hour and call.

## 2022-12-26 NOTE — PROGRESS NOTES
Admission          12/25/2022  18:50 PM  -----------------------------------------------------------  Diagnosis: HF Exacerbation, Afib w/RVR    Admitted from: [facility]  Report given from: TA Jacinto  Via: Bed  Accompanied by: ER Staff  Family Aware of Admission:   Belongings: Clothes, Credit card, cell phone  Admission Profile: Not complete  Teaching: Orientation to unit, call don't fall, use of call light, meal times, visiting hours,  when to call for the RN (angina/sob/dizzyness, etc.), and enforced importance of safety.  Access: PIV (L)(R)  Telemetry: Placed on pt  Ht./Wt.:  Not completed   2 RN skin assessment: Completed w/TA Avalos (Scab noted on abdomen, Excoriation under R breast    Temp:  [97.9  F (36.6  C)] 97.9  F (36.6  C)  Pulse:  [104-139] 121  Resp:  [16] 16  BP: ()/() 103/91  SpO2:  [67 %-99 %] 93 %     Pt alert and oriented. Afebrile. HRs 100s-120s. Atrial Fibrillation. Sats >92% on 2L NC. Pt denies any shortness of breath at rest, chest pain/palpitations, nausea, dizziness, or chills. BGs before meals. Pt's insulin pens not on unit, requested.  Purewick in place. Edematous lower extremities. Bariatric bed ordered.

## 2022-12-26 NOTE — PLAN OF CARE
Care from: 1900 - 2330    Admitted 12/25 for Hypervolemia d/t CHF  PMHx significant for NICM (EF 40-45%), paroxysmal afib (on Eliquis), ID-T2DM, HTN, JYOTI, neuropathy, morbid obesity     Pt is AOx4. Pleasant. Afebrile. HR 100s - 120s w/ Atrial fibrillation. Sats>92% on 1L NC, CPAP HS. Oximeter does not read pt well, new probe ordered. Pt denies any shortness of breath at rest, chest pain/palpitations, nausea. BG ACHS. BG at 2215 was 578, insulin given - provider notified. Re-check at 1130. External urinary cath (Riskalyze). Tolerating consistent carb, 2g NA, low sat fats diet. Denies pain. Edematous lower extremities. R PIV infusing Bumex drip 3mg/hr (12ml/hr), L PIV -SL.PRN oxycodone for back pain x1, Simethicone x1 for gas pain.    Plan: Continue to monitor Pt status and report changes to Card 2 treatment team. Bariatric bed in place. Respiratory paged for CPAP.

## 2022-12-26 NOTE — CONSULTS
Brief Diabetes team note- full consult to follow tomorrow.     47 year old female with history TIIDM,non ischemic CM, paroxysmal A fib, HTN, JYOTI, and morbid obesity who was admitted 12/25/22 with heart failure exacerbation, Afib with RVR.     She is known to outpatient ealth endocrine providers. Last scheduled visit with Juanita Jackman and Karen Velasquez on 9/13/22- no show. She was to be taking Tuojeo 225 units daily, Novolog fixed meal insulin 100 units with meals plus sliding scale, Trulicity 3 mg q week, Jardiance 25 mg daily. She couldn't tolerate Metformin.     Other prior notes indicate recommendation to start U500 due to her profound insulin resistance.     BG poorly controlled with Lantus and suboptimal dosing of Novolog with meals. Now rapidly improving with Lantus 100 units on board, plus resumption in Novolog fixed dose 80 units with meals.       Plan:   -we will reassess Lantus dose tomorrow   -stop Novolog 80 units with meals, and initiate 1 unit per 1.5g CHO to better match dose to PO intake.   -increase to very high intensity sliding scale AC/HS.     Jammie Chamorro PA-C

## 2022-12-26 NOTE — PLAN OF CARE
.D: HF exacerbation (12/25)    I: Monitored vitals and assessed pt status.   Changed: Bumex bag  Running: Bumex 3mg/hr : 12 mL/hr.   PRN:     A: A/o x4, afib w/ RVR rates in 120's-130's. CPAP on overnight, RA during the day. Purewick in w/ adequate urine output. No BM overnight. X2 w/ lift device. Per pt report, gets around the house w/ walker and assist of x1. Chronic back pain. Carb count, ACHS blood sugar checks. 2 PIV.     Vitals: BP (!) 111/95 (BP Location: Left arm)   Pulse (!) 128   Temp 97.4  F (36.3  C) (Axillary)   Resp 18   Wt (!) 233.7 kg (515 lb 4.8 oz)   SpO2 98%   BMI 83.17 kg/m       P: Continue to monitor Pt status and report any significant changes to treatment team

## 2022-12-26 NOTE — PROGRESS NOTES
"   12/26/22 1200   Appointment Info   Signing Clinician's Name / Credentials (OT) Tianna [place], OTD, OTR/L   Living Environment   People in Home alone  (Caregiver for most of the day, everyday)   Current Living Arrangements apartment   Home Accessibility stairs to enter home   Number of Stairs, Main Entrance 4   Stair Railings, Main Entrance railings safe and in good condition;railings on both sides of stairs   Living Environment Comments Pt lives in apartment with 4 OCTAV[address]. Has tub shower with bench. No GB's.   Self-Care   Usual Activity Tolerance moderate   Current Activity Tolerance fair   Equipment Currently Used at Home cane, straight;tub bench;walker, standard   Fall history within last six months yes   Number of times patient has fallen within last six months 2   Activity/Exercise/Self-Care Comment Pt has caregiver assist for most of the day, everyday. Req assist with I/ADL's. Ambulates with walker or cane. Reports difficulty with getting up from toilet, interested in a raised toilet seat.   Instrumental Activities of Daily Living (IADL)   IADL Comments caregiver completes   General Information   Onset of Illness/Injury or Date of Surgery 12/25/22   Referring Physician Madeline Vasquez MD   Additional Occupational Profile Info/Pertinent History of Current Problem \"Pricilla Brown is a 47 year old female with a past history of obesity, asthma, DM2, HTN, persistent atrial fibrillation - on Eliquis, chronic HFrEF, NICM (EF 20% by echo 9/2022), normal coronary angiography 1/2012, Graves dz s/p radioiodine ablation - now hypothyroid, JYOTI, CKD who presents via EMS for complaints of chest pain.\"   Existing Precautions/Restrictions cardiac   Left Upper Extremity (Weight-bearing Status) full weight-bearing (FWB)   Right Upper Extremity (Weight-bearing Status) full weight-bearing (FWB)   Left Lower Extremity (Weight-bearing Status) full weight-bearing (FWB)   Right Lower Extremity (Weight-bearing Status) full " weight-bearing (FWB)   General Observations and Info act: ambulate   Cognitive Status Examination   Orientation Status orientation to person, place and time   Affect/Mental Status (Cognitive) WNL   Follows Commands WNL   Visual Perception   Visual Impairment/Limitations WNL   Sensory   Sensory Comments BLE edema - tingling in BLE   Pain Assessment   Patient Currently in Pain Yes, see Vital Sign flowsheet  (BLE)   Range of Motion Comprehensive   General Range of Motion no range of motion deficits identified   Strength Comprehensive (MMT)   Comment, General Manual Muscle Testing (MMT) Assessment generally weak   Coordination   Upper Extremity Coordination No deficits were identified   Bed Mobility   Bed Mobility supine-sit   Supine-Sit [place] (Bed Mobility) minimum assist (75% patient effort)   Comment (Bed Mobility) req inc time 2/2 pain from moving BLE   Transfers   Transfer Comments did not attempt on this date, pt with inc pain in BLE & no walker at bedside. Walker ordered during eval.   Activities of Daily Living   BADL Assessment/Intervention upper body dressing;lower body dressing;toileting   Upper Body Dressing Assessment/Training   [place] Level (Upper Body Dressing) maximum assist (25% patient effort)  (per clinical judgement)   Lower Body Dressing Assessment/Training   [place] Level (Lower Body Dressing) maximum assist (25% patient effort)  (per clinical judgement)   Toileting   [place] Level (Toileting) maximum assist (25% patient effort)  (per clinical judgement)   Clinical Impression   Criteria for Skilled Therapeutic Interventions Met (OT) Yes, treatment indicated   OT Diagnosis dec IND with I/ADLs   OT Problem List-Impairments impacting ADL problems related to;activity tolerance impaired;balance;mobility;strength;pain   Assessment of Occupational Performance 3-5 Performance Deficits   Planned Therapy Interventions (OT) ADL retraining;IADL retraining;balance training;bed mobility  training;ROM;strengthening;transfer training;home program guidelines;progressive activity/exercise;risk factor education   Clinical Decision Making Complexity (OT) low complexity   Anticipated Equipment Needs Upon Discharge (OT) raised toilet seat  (TBD)   Risk & Benefits of therapy have been explained evaluation/treatment results reviewed;care plan/treatment goals reviewed;risks/benefits reviewed;current/potential barriers reviewed;participants voiced agreement with care plan;participants included;patient   OT Total Evaluation Time   OT Eval, Low Complexity Minutes (60410) 16   OT Goals   Therapy Frequency (OT) 5 times/wk   OT Predicted Duration/Target Date for Goal Attainment 01/09/23   OT Goals Hygiene/Grooming;Upper Body Dressing;Lower Body Dressing;Toilet Transfer/Toileting;Cardiac Phase 1   OT: Hygiene/Grooming modified independent   OT: Upper Body Dressing Supervision/stand-by assist   OT: Lower Body Dressing Minimal assist   OT: Toilet Transfer/Toileting Minimal assist   OT: Understanding of cardiac education to maximize quality of life, condition management, and health outcomes Patient;Verbalize;Demonstrate   OT: Functional/aerobic ambulation tolerance with stable cardiovascular response in order to return to home and community environment Modified independent;Standard walker;50 feet   OT: Navigation of stairs simulating home set up with stable cardiovascular response in order to return to home and community environment Modified independent;4 stairs;Rail on both sides   Self-Care/Home Management   Self-Care/Home Mgmt/ADL, Compensatory, Meal Prep Minutes (90784) 40   Symptoms Noted During/After Treatment (Meal Preparation/Planning Training) increased pain   Treatment Detail/Skilled Intervention Faciliated g/h tasks while sitting EOB to inc IND with ADL's, upright tolerance, & functional reach. Pt completing all tasks with setup assist. EOB balance good, however pt endorsing significant pain in BLE & OT  noting edema. While sitting up, OT providing education on conservative treatment of BLE edema. Pt verbalizing understanding. After time up at EOB, pt deciding to decline standing as pain in BLE increasing. Returned to supine with Ax2, boosted Ax2. Facilitated L<>R roll to replace naveed and reposition sheets. Left with RN at bedside & all needs met.   OT Discharge Planning   OT Plan stand, functional transfer (asha recliner & walker ordered 12/26)   OT Discharge Recommendation (DC Rec) Transitional Care Facility   OT Rationale for DC Rec At this time recommend TCU as pt presenting below functional baseline. Pt has yet to stand at bedside or complete functional transfers 2/2 significant BLE pain & edema. Pending progress with IP therapy, pt may be able to progress to home. Will update recs with progress.   OT Brief overview of current status no OOB on this date, sup>sit EOB min A   Total Session Time   Timed Code Treatment Minutes 40   Total Session Time (sum of timed and untimed services) 56

## 2022-12-27 NOTE — PROGRESS NOTES
"    SPIRITUAL HEALTH SERVICES Progress Note  [facility]    Saw pt Pricilla Brown per Admission Request.      Patient/Family Understanding of Illness and Goals of Care - Pt shared \"nothing is going right, it all just keeps going wrong,\" in reference to her various health issues.  She said that the doctors can't \"get my temperature down, my blood sugar is too low, my heart isn't working right.\"  Pt said she has goals for the future, she wants to be able to \"walk around the [place] and go [place]\" and does have some hope that those can be attained but that \"its getting harder to keep strong.\"      Distress and Loss - Pt talked about how difficult it is to continue to have health challenges despite feeling that she is doing everything she can to stay healthy.  She said that her family hasn't been as supportive as she wishes they would, and that friends have dropped away so she does not have much of a support system.       Strengths, Coping, and Resources - Pt said she spends time praying and reading the Bible and that helps her, and she is trying to focus on the positive rather than solely on all the negative things happening to her but that can be difficult.  Pt said she has many siblings but none have provided the support she wants/needs.       Meaning, Beliefs, and Spirituality - Pt was raised Anglican and finds deep connection to the Bible.  She said she is questioning her belief in God because she feels like she prays for healing and it isn't happening, and she is very worried about her health.        Plan of Care -  provided support, exploration of coping strategies, prayer and a copy of the Bible for patient.   will continue to provide support.     Rabbi Dorene Carreno  Staff   [phone]      "

## 2022-12-27 NOTE — PROVIDER NOTIFICATION
"   12/27/22 0512   Call Information   Date of Call 12/27/22   Time of Call 0512   Name of person requesting the team Mini Schumacher   Title of person requesting team RN   RRT Arrival time 0515   Time RRT ended 0545   Reason for call   Type of RRT Adult   Primary reason for call Sudden or unanticipated change in patient condition   SBAR   Situation BG 40 at 0443 and 0509   Background per provider H&P \"Pricilla Brown is a 47 year old female w/ PMHx significant for NICM (EF 40-45%), paroxysmal afib (on Eliquis), ID-T2DM, HTN, JYOTI, neuropathy, morbid obesity who presented to the ED on 12/25/2022 due to chest discomfort, palpitations, volume overload concerning for heart failure exacerbation and afib w/ RVR.\"   Notable History/Conditions Cardiac;Diabetes;Hypertension   Assessment BG improved to 79 after 25mL D50%. Drowsy but wakes to voice and interacts. No new complaints ex reports feeling tired and hot. Not currently diaphoretic. VSS in Afib with rate in the 60s. Pt was also given oral carbohydrate intake. Regardless, BG once again dropped to 58. High dose carb coverage with meals discontinued by provider until further evaluation by endocrine today.   Interventions Blood glucose;Meds   Patient Outcome   Patient Outcome Stabilized on unit   RRT Team   Attending/Primary/Covering Physician Cards 2   Date Attending Physician notified 12/27/22   Time Attending Physician notified 0512   Physician(s) Mini Orosco NP with RRT and  with Cards 2   Lead RN Lois Burgos & Johnson Younger   RT n/a   Post RRT Intervention Assessment   Post RRT Assessment Stable/Improved   Date Follow Up Done 12/27/22   Time Follow Up Done 0830   Comments Glucose 98. To review insulin and hypoglycemia coverage with Endocrine team.  Afebrile with vss.  Currently in RA with 02 sats mid 90's.  Opens eyes spontaneously and following commands.  Endocrine daily rounds pending.    Continue to monitor glucose very closely.       "

## 2022-12-27 NOTE — CONSULTS
NEW INPATIENT DIABETES MANAGEMENT CONSULT  Pricilla Brown  Age: 47 year old  MRN # [id number]   YOB: 1975    Chief Complaint: heart failure exacerbation  Reason for Consult: would appreciate assistance with insulin management (no Tuojeo as inpatient per pharmacy) and holding versus continuing Jardiance for GDMT while inpatient.   Consulting Provider: Madeline Vasquez MD    History of Present Illness:   Pricilla is a 47 year old female with a PMHx non-ischemic CM, paroxysmal atrial fibrillation, insulin dependent type 2 diabetes, HTN, JYOTI, morbid obesity who was admitted on 12/25/2022 for acute heart failure exacerbation, and atrial fibrillation with RVR.     Pt is known to the Inpatient Diabetes Service from past admission(s) in 2013 (around initial diagnosis). Has followed [facility] as an outpatient, but many recent no shows.    History obtained via the patient, chart review, and discussion with consulting team.    Most recent PTA diabetes regimen includes: Tuojeo 225 units daily AM, Novolog 100 units with each meal, plus a ~ 1/15 correction scale >140 before meals. She also reports that she takes Trulicity 1 mg on Mondays (last dose >1 week ago), and Jardiance 25 mg daily. She has U500 insulin on PTA med list- but is NOT taking (missed outpatient follow ups, where starting this insulin was to be discussed).     Cardiology note indicates that pt may not have been adherent to reported insulin doses PTA. Per our inpatient provider notes from 2013, she admitted to missing 1-2 doses of Lantus per week.     BG on admission >350. She was given Lantus (per apparent pharmacy report that  PTA Tuojeo is not available in the hospital) 50 units and started on sliding scale Novolog (no mealtime Novolog), along with additional one time doses, with BG remaining 290-340's. Her Lantus was doubled to 100 units, and mealtime insulin was added at 80 units fixed dosing with meals. Her BG improved in a rapid  fashion with second fixed mealtime insulin dose,  to 190's. Diabetes team consulted and recommended switch to an insulin carb ratio (1 unit per 1.5g, which was actually a substantial reduction given that she is on 60g CHO diet), and she received 45 units with her final meal of the day yesterday.    Since then, she's experienced severe and prolonged hypoglycemia.     All insulin held since last evening at 2100. She consumed 6 glasses of apple juice +sugar packets overnight, and received a total of 87.5 mL D50% with stabilization to 80's, but with recurrent BG into 60's by early AM. D10% was started at 50cc/hour at 1000, and increased to 75cc/hour at 1200 for a recurrent hypoglycemia episode to 60mg/dL despite no carb coverage for breakfast, lunch. Her BG now also >24 hours since Lantus dose are persisting in the 70-80's. Upon discussion with Cardiology, concerned about fluid status; she's getting diuresed with continuous Bumex.     Pricilla was surprised at the severity of lows. She confirmed to this provider consistent administration of her reported PTA insulin doses at home. She denied significant change in eating patterns here, compared to at home. She has diuresed 19 lbs since her admission. She's feeling tired.       Other Active/Contributory Medical Problems: heart failure with NIRAJ, fluid status shifting.  Diabetes Mellitus Type: II  Duration:    Since at least 2013  Diabetic Complications: neuropathy  Prior to Admission Diabetes Regimen:      BG monitor: fingerstick  Frequency of checks: 3 times daily  Tuojeo (glargine U300) 225 units once daily  Novolog 100  Units with meals, plus 1/15 correction scale  Trulicity 1mg weekly on Mondays  Jardiance 25 mg daily AM  (has not tolerated Metformin in the past)  Usual BG control PTA: A1c 13.5%  Able to Detect Hypoglycemia: yes  Usual Diabetes Care Provider: has seen [facility] endocrine, but many no shows recently.  Primary Care Provider: Lenore Roberts  Current Diet:  Orders Placed This Encounter      Combination Diet Moderate Consistent Carb (60 g CHO per Meal) Diet; 2 gm NA Diet; Low Saturated Fat Diet       10 point ROS completed with pertinent positives and negatives noted in the HPI  Past medical, family and social histories are reviewed and updated.    Social History  Social History     Socioeconomic History     Marital status: Single   Tobacco Use     Smoking status: Light Smoker     Packs/day: 0.25     Years: 13.00     Pack years: 3.25     Types: Cigarettes     Smokeless tobacco: Never     Tobacco comments:     down to 2 cigs a day   Substance and Sexual Activity     Alcohol use: No     Drug use: No     Sexual activity: Yes     Partners: Male     Birth control/protection: Condom   Social History Narrative    Single, no children        Gyn:        Last pap several years ago, no abnormal    No STIs            Patient is single.  She is no longer working.  She used to work in the area of customer service.  She is currently living with her sister in [place], [place].  She has no pets.  Patient has smoked a half pack of cigarettes a day for the past 10 plus years.  She states that she is down to 5 cigarettes a day with the aid of Wellbutrin.  She does not smoke cigars, pipes or chew tobacco.  She has 1 cup of coffee in the morning.  She does not drink alcohol.  Patient does not exercise.        Family History  Mother, sister, brother with history of diabetes.     Physical Exam   BP 98/81 (BP Location: Right arm)   Pulse 110   Temp 97.9  F (36.6  C) (Oral)   Resp 16   Wt (!) 225 kg (496 lb 0.6 oz)   SpO2 95%   BMI 80.06 kg/m    General: pleasant, in no distress, eating PB crackers   HEENT: normocephalic, atraumatic. Oral mucous membranes moist.   Lungs: unlabored respiration, no cough  ABD: rounded, nondistended  Skin: warm and dry, no obvious lesions  MSK:  moves all extremities  Lymp:  no LE edema   Mental status:  alert, oriented to self, place,  time  Psych:   calm and appropriate interaction     Most Recent Laboratory Tests:  Recent Labs   Lab 12/27/22  0511   HGB 15.8*     Recent Labs   Lab 12/25/22  1620   A1C 13.5*     Recent Labs   Lab 12/27/22  0511   CR 1.42*     Recent Labs   Lab 12/27/22  1354 12/27/22  1256 12/27/22  1202 12/27/22  1059 12/27/22  0957 12/27/22  0946   GLC 72 65* 60* 82 66* 88       Assessment:   1) Type II diabetes un controlled (A1c 13.5%) with insulin resistance.  2) Hypoglycemia- multifactorial- related to aggressive insulin titration, lpossible nonadherance to PTA insulin regimen, NIRAJ on CKD this admission, and fluid shifts resulting in likely improved insulin absorption.    Plan:    -all insulin is held, pending stabilization in BG.   -BG monitoring q2h until BG stabilized   -D10% increased from 50 to 75cc/hour midday- will reduce/discontinue as soon as feasible, being mindful of fluid administration in pt with heart failure exacerabation. D20% an option but carries risk in the event of extravasation, and is usually administered through a central line.    -hypoglycemia protocol   -recommend consistent diet with carb counting protocol   -diabetes education needs will be assessed closer to discharge   -on discharge, will recommend outpatient follow up with [facility] service     Discussed plan of care with patient, nursing, and primary team   Thank you for this consult; Inpatient Diabetes will continue to follow.         Contacting the Inpatient Diabetes Team   From 8AM-4PM: page inpatient diabetes provider that is following the patient, or utilize the job code paging system.   From 4PM-8AM: page the job code for endocrine fellow on call.       Please use the following job code to reach the Inpatient Diabetes team. Note that you must use an in house phone and that job codes cannot receive text pages.     Dial [phone] (or star-star-star [phone] on the new [employer] telephones), then [phone] to reach the endocrine-diabetes provider  on call.    90 minutes spent on the date of the encounter doing chart review, history and exam, documentation and further activities per the note      Over 50% of my time on the unit was spent counseling the patient and/or coordinating care regarding acute hyperglycemia management.  See note for details.    Jammie Chamorro PA-C  Inpatient Diabetes Management Service  Pager [phone]

## 2022-12-27 NOTE — CARE PLAN
D: pt admitted on 12/25/2022 due to chest discomfort, palpitations, volume overload concerning for heart failure exacerbation and afib w/ RVR. Hospital course complicated by uncontrolled blood sugar.  PMH of NICM (EF 40-45%), paroxysmal afib (on Eliquis), ID-T2DM, HTN, JYOTI, neuropathy, morbid obesity.      I: Monitored vitals and assessed pt status.   Changes during this shift:       One time order of Diuril 1g, and  5 mg of Bumex bolus     Bumex gtt decreased to 2 mg/hr     D10 gtt rate increased to 75 ml/hr     Holding  Insuline until blood sugar stabalized     Increased fluid restriction to 1.5 L       Running:     D10 running 75 ml/hr    Bumex 2 mg/hr   PRN Med:     Tylenol, simethicone, lozenge,   and percocet     Vitals:  Temp: 97.9  F (36.6  C) Temp src: Oral BP: 115/71 Pulse: 63   Resp: 18 SpO2: 98 % O2 Device: BiPAP/CPAP        A:   Neuro: A&O x 4. Pt was lethargic.  Neurologically intact. Report headache, PRN tylenol given. Pt denied  dizziness, and lightheadedness. Report numbness and tingling on BLE (baseline per pt) . calls appropriately   Vitals/Cardiac:  Afebrile, VSS. A-fib (80's - 120's ),  Denies Chest pain. +3 edema on BLE  Respiratory: sating > 90%  on RA. Report SOB at rest. Diminished lung sound through out   Diet/appetite: Consistent carb/cardiac  diet, 1.5L Fluid restriction.  Good appetite  Endocrine: BS check Q 2 hrs. Insuline held until blood sugar stabilized   GI/:  No BM  this shift. Denies abdominal pain and nausea. Good  urine output, Voids without difficulties via purewicke   Activity:  assist of 2/3 with lift   Pain:  Pt reported leg and arm cramping, leg pain and headache. PRN simethicone given for cramps and Percocet for pain. Headache managed with PRN tylenol   Skin: dry skin, no new skin deficit noted.   LDAs: PIV x2        P: Continue to monitor Pt status and report changes to treatment team.

## 2022-12-27 NOTE — CODE/RAPID RESPONSE
Rapid Response Team Note    Assessment   A rapid response was called on Pricilla Brown due to hypoglycemia.     Plan   -  Primary cardiology team at bedside managing orins      YOCASTA Kaur CNP  [facility] RRT [facility] Job Code Contact #[phone]  [facility] Paging/Directory        Admission Diagnosis:   Leg swelling [M79.89]  Atrial fibrillation with rapid ventricular response (H) [I48.91]  Hypervolemia due to congestive heart failure (H) [E87.70, I50.9]  Acute on chronic HFrEF (heart failure with reduced ejection fraction) (H) [I50.23]    Physical Exam   Temp: 97.8  F (36.6  C) Temp  Min: 97.4  F (36.3  C)  Max: 97.8  F (36.6  C)  Resp: 14 Resp  Min: 14  Max: 18  SpO2: 93 % SpO2  Min: 90 %  Max: 100 %  Pulse: 70 Pulse  Min: 53  Max: 117    No data recorded  BP: (!) 135/94 Systolic (24hrs), Av , Min:90 , Max:135   Diastolic (24hrs), Av, Min:68, Max:118     I/Os: I/O last 3 completed shifts:  In: 2532.13 [P.O.:2240; I.V.:292.13]  Out: 1600 [Urine:1600]         Significant Results and Procedures   Lactic Acid:   Recent Labs   Lab Test 22  0613 22  1331 10/13/16  0815   LACT 1.3 0.4 2.2*     CBC:   Recent Labs   Lab Test 22  0511 22  0613 22  1327   WBC 6.5 9.0 10.5   HGB 15.8* 15.1 16.4*   HCT 52.8* 50.3* 53.8*   * 149* 150

## 2022-12-27 NOTE — PLAN OF CARE
D: 12/25 w/ chest discomfort, palpitations, hyperkalemia, afib w/ RVR found to be having HF exacerbation  I: Monitored vitals and assessed pt status.   Changed: Bumex bag, linens,   Running: Bumex 4mg/hr : 16 mL/hr.   PRN: Percocet     A: A/o x4, afib w/ RVR rates in 80's-120's. CPAP on overnight that was changed to BiPAP by RT d/t low respiratory rate of 6-8, RA during the day. Purewick in w/ adequate urine output. 3 large incontinent episodes that soaked chucks/linens that required linen changes. Large BM overnight. X3-4, rolls well, w/ lift device. Per pt report, gets around the house w/ walker and assist of x1. Chronic back pain. Carb count, ACHS blood sugar checks. 2 PIV.     Significant events: Pt was lethargic and unable to keep eyes open around 0430 when a team of nurses were changing linens. Blood sugar was checked and found to have BS to be 46. 50 mL of D50 was given. On recheck, blood sugar was 40. Stat labs were called and a blood sugar from venous blood from lab was checked and it was 40 again. RRT was then called. Given apple juice x6 w/ sugar packets x6, and 25 mL of D50. Blood sugar recheck was 78. 15 minute recheck blood sugar was then 56. Verbal order from MD to give 12.5 mL of D50 and recheck in 15. Blood sugar rechecks were 116 and then 100. D10 and .45 NS were ordered for continuous infusion but blood sugars have rebounded. Day nurse to follow up w/ team if they still want to give. Total of 87.5 mL of D50 given.      Vitals: /88 (BP Location: Right arm)   Pulse 90   Temp 97.9  F (36.6  C) (Oral)   Resp 15   Wt (!) 225 kg (496 lb 0.6 oz)   SpO2 98%   BMI 80.06 kg/m        P: Continue to monitor Pt status and report any significant changes to treatment team

## 2022-12-27 NOTE — PROGRESS NOTES
[facility]   Cardiology II Service / Advanced Heart Failure  Daily Progress Note  Date of Service: 12/26/2022    Patient: Pricilla Borwn  MRN: [id number]  Admission Date: 12/25/2022  Hospital Day # 2    Assessment and Plan:  Pricilla Brown is a 47 year old female w/ PMHx significant for NICM (EF 40-45%), paroxysmal afib (on Eliquis), ID-T2DM, HTN, JYOTI, neuropathy, morbid obesity who presented to the ED on 12/25/2022 due to chest discomfort, palpitations, volume overload concerning for heart failure exacerbation and afib w/ RVR. Hospital course complicated by uncontrolled blood sugar.    Changes Today:  - Diuril 1g, then Bumex bolus 5mg IV (likely repeat in PM)  - Decreased Bumex gtt to 2mg/hr  - Goal net negative 3-5L/day  - Holding insulin  - Increased D10 to 75cc/hr until sustained improvement in blood sugar  - Will touch base with endocrinology regarding insulin regimen    Acute Issues:  Acute on chronic HFrEF (EF 40-45%) 2/2 NICM (Stage C, NYHA Class IIII)  Patient presents with sxs of volume overload, including SOB, MURRAY, PND, orthopnea, increased abdominal girth, 3+ BLE pitting edema. NTproBNP 2788. CXR w/ pulmonary edema. Suspect 2/2 dietary and medication noncompliance vs. afib w/ RVR. Unclear dry weight, although weight at discharge from prior hospital admission (10/1/2022) was 462 lbs. Admission weight 515 lbs.  - Diuresis: Diuril 1g, then Bumex bolus 5mg IV (likely repeat in PM); decreased Bumex gtt to 2mg/hr   - Goal net negative 3-5L/day   - Hold PTA Bumex 5mg BID  - GDMT              - ACEi/ARB/ARNi: continue PTA hydralazine 12.5mg TID, Isordil 20mg TID               - Aldosterone Inhibitor: continue PTA spironolactone 50mg daily              - Beta blocker: continue PTA metoprolol succinate 50mg daily               - SGLT2i: continue PTA empagliflozin 25mg daily  - SCD: none  - Will consider repeat TTE once HR more controlled (most recent in 6/2022)  - Telemetry  - Replete K > 4,  Mg > 2 (RN repletion protocol ordered)  - Strict I/O  - Daily weights  - Na (2g) and fluid (< 1.5L/d) restricted diet     Atrial fibrillation w/ RVR  [id number]. Patient has not been taking PTA metoprolol for a few days.  - Continue PTA apixaban 5mg BID  - Continue PTA metoprolol succinate 50mg daily  - If sustained HR > 130s, will start amiodarone gtt    Insulin-dependent T2DM, uncontrolled c/b neuropathy  A1c 13.5%. Patient does not take insulin consistently at home. Unclear if insulin doses listed in home med list are accurate; will start with lower doses similar to those at last hospitalization. Likely will need to uptitrate insulin during admission.  - Endocrinology consulted; recs appreciated   - Hold insulin until sustained improvement in blood sugar   - Will touch base to determine more conservative insulin regimen  - Hold PTA insulin regimen: glargine (Toujeo) 225U in AM; aspart 100U TID  - Hold PTA semaglutide     Chronic/Resolved Issues:  Elevated troponin 2/2 AoC HF and afib w/ RVR, peaked  Troponin 72 > 74 > 67. Low suspicion for ACS given resolution of chest pain and no ischemic changes seen on EKG. Chest pain lasts for seconds then dissipates and occurs with palpitations, likely representing discomfort from significant volume overload.    CKD III, stable  Cr 1.62 on admission. Baseline Cr approximately 1.3-1.4.  - Trend Cr  - Avoid nephrotoxins    Hypothyroidism, uncontrolled  TSH 14.3 and fT4 0.53 on admission. Patient states that she has not taken PTA levothyroxine in > 1 week.  - Continue PTA levothyroxine 200mcg daily (400mcg on Sundays)     HTN  - Hold PTA hydrochlorothiazide 12.5mg BID     COPD  - Continue PTA inhalers     JYOTI  - CPAP ordered     Diet: Cardiac and carb control, 1.5L fluid restriction  DVT ppx: Eliquis  Code Status: Full (confirmed with patient on admission)  Discharge plan: Inpatient admission for further diuresis and HR control     Patient was discussed with attending  physician, Dr. Walters.     Madeline Vasquez MD  Internal Medicine, PGY-1    ================================================================    Interval History:   Blood glucose in 40s overnight. Given D50 pushes. Persistently hypoglycemic with BG in 60s this morning. Endorses generalized muscle aches and fatigue. Denies chest pain, palpitations, SOB, pre-syncope.    Medications: Reviewed.     Physical Exam:   Temp:  [97.5  F (36.4  C)-97.9  F (36.6  C)] 97.9  F (36.6  C)  Pulse:  [] 53  Resp:  [14-18] 16  BP: ()/(68-95) 121/95  Cuff Mean (mmHg):  [83] 83  FiO2 (%):  [21 %] 21 %  SpO2:  [93 %-100 %] 100 %  GENERAL: No acute distress.  HEENT: Bilateral eye erythema. EOMI.  NECK: Supple. JVP approximately 14cm.  CV: S1/S2 heard without murmur. Distant heart sounds.  RESPIRATORY: Basilar crackles. No wheezing.  GI: Soft and non-distended with normoactive bowel sounds present in all quadrants. No tenderness, rebound, guarding.  EXTREMITIES: 3+ BLE edema.  NEUROLOGIC: Alert and orientated x 3. CN II-XII grossly intact. No focal deficits.  SKIN: No jaundice. No acute rashes or lesions.    Vitals:    12/25/22 1440 12/26/22 0700 12/27/22 0600   Weight: (!) 233.7 kg (515 lb 4.8 oz) (!) 224 kg (493 lb 13.3 oz) (!) 225 kg (496 lb 0.6 oz)        I/O last 3 completed shifts:  In: 3632.13 [P.O.:3340; I.V.:292.13]  Out: 2500 [Urine:2500]    Peripheral IV Anterior;Left (Active)   Site Assessment WDL 12/27/22 0200   Line Status Saline locked 12/27/22 0200   Dressing Intervention New dressing  12/25/22 1651   Phlebitis Scale 0-->no symptoms 12/27/22 0200   Infiltration Scale 0 12/27/22 0200   Number of days:        Peripheral IV 12/26/22 Anterior;Left Upper forearm (Active)   Site Assessment WDL 12/27/22 0200   Line Status Infusing 12/27/22 0200   Phlebitis Scale 0-->no symptoms 12/27/22 0200   Infiltration Scale 0 12/27/22 0200   Number of days: 1       Peripheral IV 12/27/22 Anterior;Right Lower forearm (Active)    Site Assessment [facility] 12/27/22 0616   Line Status Saline locked 12/27/22 0616   Dressing Intervention New dressing  12/27/22 0616   Phlebitis Scale 0-->no symptoms 12/27/22 0616   Number of days: 0       External Urinary Catheter (Active)   Skin no redness;no breakdown 12/26/22 2200   Urine Color Yellow 12/26/22 2200   Urine Appearance Clear 12/26/22 2200   Output (mL) 500 mL 12/27/22 1200   Collection Container Standard 12/26/22 2200   External Catheter changed Done 12/26/22 2200   Number of days: 1     Labs:  ABG:  Lab Results   Component Value Date    PH 7.39 12/25/2022          Lab Results   Component Value Date     12/26/2022     12/25/2022     10/31/2022     06/03/2021     08/19/2020     10/29/2019    Lab Results   Component Value Date    CHLORIDE 95 12/26/2022    CHLORIDE 94 12/25/2022    CHLORIDE 98 10/31/2022    CHLORIDE 103 06/15/2022    CHLORIDE 101 10/20/2021    CHLORIDE 98 09/09/2021    CHLORIDE 98 06/03/2021    CHLORIDE 95 08/19/2020    CHLORIDE 102 10/29/2019    Lab Results   Component Value Date    BUN 64.2 12/26/2022    BUN 70.5 12/25/2022    BUN 44.8 10/31/2022    BUN 17 06/15/2022    BUN 14 10/20/2021    BUN 18 09/09/2021    BUN 13 06/03/2021    BUN 14 08/19/2020    BUN 14 10/29/2019      Lab Results   Component Value Date    POTASSIUM 4.0 12/26/2022    POTASSIUM 4.1 12/25/2022    POTASSIUM 3.5 10/31/2022    POTASSIUM 3.5 06/15/2022    POTASSIUM 4.3 10/20/2021    POTASSIUM 3.7 09/09/2021    POTASSIUM 4.2 06/03/2021    POTASSIUM 4.4 08/19/2020    POTASSIUM 3.6 10/29/2019    Lab Results   Component Value Date    CO2 29 12/26/2022    CO2 30 12/25/2022    CO2 31 10/31/2022    CO2 29 06/15/2022    CO2 30 10/20/2021    CO2 28 09/09/2021    CO2 25 06/03/2021    CO2 26 08/19/2020    CO2 30 10/29/2019    Lab Results   Component Value Date    CR 1.47 12/26/2022    CR 1.62 12/25/2022    CR 1.58 10/31/2022    CR 0.89 06/03/2021    CR 1.00 08/19/2020    CR 1.25 10/29/2019         Lab Results   Component Value Date    WBC 6.5 12/27/2022    WBC 9.0 12/26/2022    WBC 10.5 12/25/2022    HGB 15.8 (H) 12/27/2022    HGB 15.1 12/26/2022    HGB 16.4 (H) 12/25/2022    HCT 52.8 (H) 12/27/2022    HCT 50.3 (H) 12/26/2022    HCT 53.8 (H) 12/25/2022    MCV 96 12/27/2022    MCV 97 12/26/2022    MCV 96 12/25/2022     (L) 12/27/2022     (L) 12/26/2022     12/25/2022     Lab Results   Component Value Date    AST 36 (H) 12/27/2022    AST 22 12/26/2022    AST 32 12/25/2022    ALT 34 12/27/2022    ALT 33 12/26/2022    ALT 40 (H) 12/25/2022    ALKPHOS 148 (H) 12/27/2022    ALKPHOS 142 (H) 12/26/2022    ALKPHOS 167 (H) 12/25/2022    BILITOTAL 1.1 12/27/2022    BILITOTAL 1.3 (H) 12/26/2022    BILITOTAL 1.5 (H) 12/25/2022    BILICONJ 0.0 10/17/2013     Lab Results   Component Value Date    INR 1.36 (H) 12/25/2022    INR 0.99 08/19/2020    INR 1.94 (H) 10/29/2019

## 2022-12-28 NOTE — PLAN OF CARE
D: 12/25 w/ chest discomfort, palpitations, hyperkalemia, afib w/ RVR found to be having HF exacerbation    I: Monitored vitals and assessed pt status.     Changed: Bumex gtt held d/t cramps, reassess in the morning. LDL Midline placed  Running: D10 & .45NS running at 75 mL/hr  PRN: Percocet, Flexeril     A: A/o x4, afib w/ RVR rates in 80's-120's. CPAP on overnight that was changed to BiPAP by RT d/t low respiratory rate of 6-8, RA during the day. Keenan inserted d/t strict I/O's, adequate urine output. 2 Large BM overnight. X3-4, rolls well. Per pt report, gets around the house w/ walker and assist of x1. Chronic back pain. Carb count, ACHS blood sugar checks. 2 PIV. Last BS was 173 this AM. Midline dressing changed d/t being saturated.     Vitals: /88 (BP Location: Left arm)   Pulse 115   Temp 97.6  F (36.4  C) (Axillary)   Resp 14   Wt (!) 209 kg (460 lb 12.2 oz)   SpO2 97%   BMI 74.37 kg/m         P: Continue to monitor Pt status and report any significant changes to treatment team

## 2022-12-28 NOTE — PROGRESS NOTES
12/28/22 1100   Appointment Info   Signing Clinician's Name / Credentials (OT) Louiskylee Harrell, OTR/L   Edema General Information   Onset of Edema   (chronic)   Affected Body Part(s) Left LE;Right LE   Edema Etiology Other (see comments)   Etiology Comments CHF, hypervolemia, decreased muscle pump activation   Edema Precautions Cardiac Edema/CHF   General Comments/Previous Edema Treatment/Edema Equipment Pt reports she has had treatment for edema before, states edema waxes and wanes.   Edema Examination/Assessment   Skin Condition Dryness;Pitting;Intact   Skin Condition Comments Skin intact but very dry, generalized firm 2+ pitting present from MTPs to knee creases, no pitting in thighs, swelling localized distally. Skin wrinkled from being over stretched, no open sores or wounds but very sensitive to touch and tender from mid shins to feet.   Scar No   Ulcerations No   Skin Integrity Intact   Pitting Assessment 2+ firm   Clinical Impression   Criteria for Skilled Therapeutic Interventions Met (OT) Yes, treatment indicated   OT Diagnosis Increased BLE edema impacting optimal functional mobility and ADL performance.   Edema: Patient Presentation Edema   Influenced by the following impairments decreased strength, ROM, and flexibility in BLEs   Edema: Planned Interventions Gradient compression bandaging;Fit for compression garment   Clinical Decision Making Complexity (OT) low complexity   Risk & Benefits of therapy have been explained evaluation/treatment results reviewed;care plan/treatment goals reviewed;risks/benefits reviewed;current/potential barriers reviewed;participants voiced agreement with care plan;participants included;patient   Clinical Impression Comments Pt presents to OT today with signficant BLE edema, impacting optimal functional mobility and ADL performance. Pt to benefit from skilled edema services to address the previous mentioned problem list.   OT Total Evaluation Time   OT Eval, Low Complexity  Minutes ([id number]) 3   OT Goals   Therapy Frequency (OT) 4 times/wk  (edema)   OT Predicted Duration/Target Date for Goal Attainment 01/10/23   OT: Edema education to increase ability to manage edema after discharge from the hospital Patient;Caregiver;Demonstrate;Jay;Skin care routine;signs/symptoms of intolerance;wear schedule;limb positioning;garrnet/bandage care;discharge recommendations   OT: Management of edema bandages Patient;Caregiver;Demonstrate;Jay;quick wrap;garment(s)   OT Discharge Planning   OT Plan Edema: New G2 (1/3)   Total Session Time   Total Session Time (sum of timed and untimed services) 3

## 2022-12-28 NOTE — PROGRESS NOTES
[facility]   Cardiology II Service / Advanced Heart Failure  Daily Progress Note  Date of Service: 12/26/2022    Patient: Pricilla Brown  MRN: [id number]  Admission Date: 12/25/2022  Hospital Day # 3    Assessment and Plan:  Pricilla Brown is a 47 year old female w/ PMHx significant for NICM (EF 40-45%), paroxysmal afib (on Eliquis), ID-T2DM, HTN, JYOTI, neuropathy, morbid obesity who presented to the ED on 12/25/2022 due to chest discomfort, palpitations, volume overload concerning for heart failure exacerbation and afib w/ RVR. Hospital course complicated by uncontrolled blood sugar.    Changes Today:  - Midline and Keenan placed overnight  - Bumex 5mg IV BID  - If UOP decreasing in PM, will add Diuril 1g  - Discontinued Bumex gtt  - Goal net negative 3-5L/day  - Continue holding insulin  - Decreased D10 to 50cc/hr  - Permissive hyperglycemia with goal blood sugar 180-200s before stopping D10  - Will touch base with endocrinology regarding insulin regimen  - Lower extremity wraps    Acute Issues:  Acute on chronic HFrEF (EF 40-45%) 2/2 NICM (Stage C, NYHA Class IIII)  Patient presents with sxs of volume overload, including SOB, MURRAY, PND, orthopnea, increased abdominal girth, 3+ BLE pitting edema. NTproBNP 2788. CXR w/ pulmonary edema. Suspect 2/2 dietary and medication noncompliance vs. afib w/ RVR. Unclear dry weight, although weight at discharge from prior hospital admission (10/1/2022) was 462 lbs. Admission weight 515 lbs.  - Diuresis: Bumex 5mg IV BID   - If UOP decreasing in PM, will add Diuril 1g   - Goal net negative 3-5L/day   - Hold PTA Bumex 5mg BID  - GDMT              - ACEi/ARB/ARNi: continue PTA hydralazine 12.5mg TID, Isordil 20mg TID               - Aldosterone Inhibitor: continue PTA spironolactone 50mg daily              - Beta blocker: continue PTA metoprolol succinate 50mg daily               - SGLT2i: continue PTA empagliflozin 25mg daily  - SCD: none  - Will consider  repeat TTE once HR more controlled (most recent in 6/2022)  - Telemetry  - Replete K > 4, Mg > 2 (RN repletion protocol ordered)  - Strict I/O  - Daily weights  - Na (2g) and fluid (< 1.5L/d) restricted diet  - Lower extremity wraps    Atrial fibrillation w/ RVR  [id number] 4. Patient has not been taking PTA metoprolol for a few days.  - Continue PTA apixaban 5mg BID  - Continue PTA metoprolol succinate 50mg daily  - If sustained HR > 130s, will start amiodarone gtt    Insulin-dependent T2DM, uncontrolled c/b neuropathy  A1c 13.5%. Patient does not take insulin consistently at home. Unclear if insulin doses listed in home med list are accurate; started with lower doses similar to those at last hospitalization. Patient confirms that she takes her PTA insulin as prescribed, although later admitted that she usually eats significantly more at home. Blood sugar has been labile throughout admission.  - Endocrinology consulted; recs appreciated   - Continue holding insulin   - Decreased D10 to 50cc/hr   - Permissive hyperglycemia with goal blood sugar 180-200s before stopping D10   - Will touch base with endocrinology regarding insulin regimen  - Hold PTA insulin regimen: glargine (Toujeo) 225U in AM; aspart 100U TID  - Hold PTA semaglutide     Chronic/Resolved Issues:  Elevated troponin 2/2 AoC HF and afib w/ RVR, peaked  Troponin 72 > 74 > 67. Low suspicion for ACS given resolution of chest pain and no ischemic changes seen on EKG. Chest pain lasts for seconds then dissipates and occurs with palpitations, likely representing discomfort from significant volume overload.    CKD III, stable  Cr 1.62 on admission. Baseline Cr approximately 1.3-1.4.  - Trend Cr  - Avoid nephrotoxins    Hypothyroidism, uncontrolled  TSH 14.3 and fT4 0.53 on admission. Patient states that she has not taken PTA levothyroxine in > 1 week.  - Continue PTA levothyroxine 200mcg daily (400mcg on Sundays)     HTN  - Hold PTA hydrochlorothiazide  12.5mg BID     COPD  - Continue PTA inhalers     JYOTI  - CPAP ordered     Diet: Cardiac and carb control, 1.5L fluid restriction  DVT ppx: Eliquis  Code Status: Full (confirmed with patient on admission)  Discharge plan: Inpatient admission for further diuresis and HR control     Patient was discussed with attending physician, Dr. Walters.     Madeline Vasquez MD  Internal Medicine, PGY-1    ================================================================    Interval History:   No acute events overnight. Nursing notes reviewed. Midline and Keenan placed. Patient endorses continued generalized muscle cramps, but states that they are tolerable. Abdominal fullness, lower extremity tightness, breathing have improved. Denies chest pain and palpitations.     Medications: Reviewed.     Physical Exam:   Temp:  [97.4  F (36.3  C)-97.9  F (36.6  C)] 97.6  F (36.4  C)  Pulse:  [] 115  Resp:  [14-18] 14  BP: ()/(67-95) 113/88  SpO2:  [95 %-100 %] 97 %  GENERAL: No acute distress.  HEENT: Bilateral eye erythema. EOMI.  NECK: Supple. JVP approximately 12cm.  CV: S1/S2 heard without murmur. Distant heart sounds.  RESPIRATORY: Basilar crackles. No wheezing.  GI: Soft and non-distended with normoactive bowel sounds present in all quadrants. No tenderness, rebound, guarding.  EXTREMITIES: 3+ BLE edema.  NEUROLOGIC: Alert and orientated x 3. CN II-XII grossly intact. No focal deficits.  SKIN: No jaundice. No acute rashes or lesions.    Vitals:    12/26/22 0700 12/27/22 0600 12/28/22 0700   Weight: (!) 224 kg (493 lb 13.3 oz) (!) 225 kg (496 lb 0.6 oz) (!) 209 kg (460 lb 12.2 oz)        I/O last 3 completed shifts:  In: 2704.08 [P.O.:2300; I.V.:404.08]  Out: 4950 [Urine:4950]    Peripheral IV 12/27/22 Anterior;Right Lower forearm (Active)   Site Assessment WDL 12/27/22 0616   Line Status Saline locked 12/27/22 0616   Dressing Intervention New dressing  12/27/22 0616   Phlebitis Scale 0-->no symptoms 12/27/22 0616   Number  of days: 1       Midline Catheter Double Lumen Left Basilic (Active)   Site Assessment Bleeding;Leaking 12/28/22 0200   Dressing Chlorhexidine disk;Transparent;Securement device 12/28/22 0200   Dressing Status new drainage 12/28/22 0200   Dressing Intervention dressing changed 12/28/22 0200   Dressing Change Due 01/03/22 12/28/22 0200   Line Necessity Yes, meets criteria 12/28/22 0200   Purple - Status saline locked 12/28/22 0200   Purple - Intervention Flushed 12/28/22 0200   Red - Status saline locked 12/28/22 0200   Red - Intervention Flushed 12/28/22 0200   Number of days: 1       Urethral Catheter 12/27/22 (Active)   Tube Description Positional 12/27/22 2200   Catheter Care Done 12/27/22 2200   Collection Container Standard 12/27/22 2200   Securement Method Securing device (Describe) 12/27/22 2200   Rationale for Continued Use Strict 1-2 Hour I&O 12/27/22 2200   Urine Output 550 mL 12/28/22 0700   Number of days: 1     Labs:  ABG:  Lab Results   Component Value Date    PH 7.39 12/25/2022          Lab Results   Component Value Date     12/26/2022     12/25/2022     10/31/2022     06/03/2021     08/19/2020     10/29/2019    Lab Results   Component Value Date    CHLORIDE 95 12/26/2022    CHLORIDE 94 12/25/2022    CHLORIDE 98 10/31/2022    CHLORIDE 103 06/15/2022    CHLORIDE 101 10/20/2021    CHLORIDE 98 09/09/2021    CHLORIDE 98 06/03/2021    CHLORIDE 95 08/19/2020    CHLORIDE 102 10/29/2019    Lab Results   Component Value Date    BUN 64.2 12/26/2022    BUN 70.5 12/25/2022    BUN 44.8 10/31/2022    BUN 17 06/15/2022    BUN 14 10/20/2021    BUN 18 09/09/2021    BUN 13 06/03/2021    BUN 14 08/19/2020    BUN 14 10/29/2019      Lab Results   Component Value Date    POTASSIUM 4.0 12/26/2022    POTASSIUM 4.1 12/25/2022    POTASSIUM 3.5 10/31/2022    POTASSIUM 3.5 06/15/2022    POTASSIUM 4.3 10/20/2021    POTASSIUM 3.7 09/09/2021    POTASSIUM 4.2 06/03/2021    POTASSIUM 4.4 08/19/2020     POTASSIUM 3.6 10/29/2019    Lab Results   Component Value Date    CO2 29 12/26/2022    CO2 30 12/25/2022    CO2 31 10/31/2022    CO2 29 06/15/2022    CO2 30 10/20/2021    CO2 28 09/09/2021    CO2 25 06/03/2021    CO2 26 08/19/2020    CO2 30 10/29/2019    Lab Results   Component Value Date    CR 1.47 12/26/2022    CR 1.62 12/25/2022    CR 1.58 10/31/2022    CR 0.89 06/03/2021    CR 1.00 08/19/2020    CR 1.25 10/29/2019        Lab Results   Component Value Date    WBC 6.5 12/27/2022    WBC 9.0 12/26/2022    WBC 10.5 12/25/2022    HGB 15.8 (H) 12/27/2022    HGB 15.1 12/26/2022    HGB 16.4 (H) 12/25/2022    HCT 52.8 (H) 12/27/2022    HCT 50.3 (H) 12/26/2022    HCT 53.8 (H) 12/25/2022    MCV 96 12/27/2022    MCV 97 12/26/2022    MCV 96 12/25/2022     (L) 12/27/2022     (L) 12/26/2022     12/25/2022     Lab Results   Component Value Date    AST 36 (H) 12/27/2022    AST 22 12/26/2022    AST 32 12/25/2022    ALT 34 12/27/2022    ALT 33 12/26/2022    ALT 40 (H) 12/25/2022    ALKPHOS 148 (H) 12/27/2022    ALKPHOS 142 (H) 12/26/2022    ALKPHOS 167 (H) 12/25/2022    BILITOTAL 1.1 12/27/2022    BILITOTAL 1.3 (H) 12/26/2022    BILITOTAL 1.5 (H) 12/25/2022    BILICONJ 0.0 10/17/2013     Lab Results   Component Value Date    INR 1.36 (H) 12/25/2022    INR 0.99 08/19/2020    INR 1.94 (H) 10/29/2019

## 2022-12-28 NOTE — PROGRESS NOTES
End of Shift Summary: See flowsheets for vital signs and detailed assessment.    Changes this shift as follows    Neuro: AxO, PRN percocet and flexeril   CV: remains afib, BID bumex  Resp: RA  GI: no bm, q4h BG checks, D10/.45NS at 50.   : phan remains in place for adequate I/O  Skin/access: lymphedema wraps placed per OT      Plan: Continue to diurese, monitor BG

## 2022-12-28 NOTE — PROCEDURES
[facility]    Double Lumen Midline Placement    Date/Time: 12/27/2022 9:26 PM  Performed by: TA Aguilar  Authorized by: Romeo Walters MD   Indications: vascular access      UNIVERSAL PROTOCOL   Site Marked: Yes  Prior Images Obtained and Reviewed:  Yes  Required items: Required blood products, implants, devices and special equipment available    Patient identity confirmed:  Verbally with patient, arm band, provided demographic data, hospital-assigned identification number and anonymous protocol, patient vented/unresponsive  Patient was reevaluated immediately before administering moderate or deep sedation or anesthesia  Confirmation Checklist:  Patient's identity using two indicators, relevant allergies, procedure was appropriate and matched the consent or emergent situation and correct equipment/implants were available  Time out: Immediately prior to the procedure a time out was called    Universal Protocol: the Joint Commission Universal Protocol was followed    Preparation: Patient was prepped and draped in usual sterile fashion       ANESTHESIA    Anesthesia: See MAR for details  Local Anesthetic:  Lidocaine 1% without epinephrine  Anesthetic Total (mL):  1      SEDATION    Patient Sedated: No        Preparation: skin prepped with ChloraPrep  Skin prep agent: skin prep agent completely dried prior to procedure  Sterile barriers: maximum sterile barriers were used: cap, mask, sterile gown, sterile gloves, and large sterile sheet  Hand hygiene: hand hygiene performed prior to central venous catheter insertion  Type of line used: Midline  Catheter type: double lumen  Lumen Identification: Purple and Red  Catheter size: 5 Fr  Brand: Bard  Lot number: [id number]  Placement method: venipuncture, MST and ultrasound  Number of attempts: 1  Difficulty threading catheter: no  Successful placement: yes  Orientation: left    Location: basilic vein  Tip Location:  distal to axillary vein  Arm circumference: adults 10 cm  Extremity circumference: 36  Visible catheter length: 0  Total catheter length: 20  Dressing and securement: chlorhexidine patch applied, dressing applied, line secured, statlock, sterile dressing applied and transparent dressing  Post procedure assessment: blood return through all ports and free fluid flow  PROCEDURE   Patient Tolerance:  Patient tolerated the procedure well with no immediate complicationsDescribe Procedure: Mid line ok to use

## 2022-12-28 NOTE — PLAN OF CARE
Assumed cares [id number]      A&OX4. Ax2-3 with lift to repo. Tachycardic 100-120's with movement. Sats stable between % on RA. Endorses pain 6-8/10 in back, Voltaren gel ordered. Mod CHO, 2 gm sodium, low sat fat diet with 1500 mL fluid restriction. Good appetite; ate 100% of dinner. , covered with 5 units of Novolog per orders. Phos replacement initially started from RN replacement protocol, shortly after starting writer noticed there was a recheck phos at 1350 and was WNL, so replacement was stopped and team notified. Midline to left arm SL. PIV in right arm SL. Remains in afib. +flatus, +BS, BM today. Keenan in place with 2625 mL o/p. 3+ edema to BLE, lymph wraps in place. CMS inact. Antifungal powder ordered for skin folds.  Nicotine patch to left shoulder. Refused skin assessment for back, buttocks. On phone, watching TV and resting between cares.

## 2022-12-28 NOTE — PROGRESS NOTES
IP Diabetes Management  Daily Note           Assessment and Plan:   HPI: Pricilla is a 47 year old female with a PMHx non-ischemic CM, paroxysmal atrial fibrillation, insulin dependent type 2 diabetes, HTN, JYOTI, morbid obesity who was admitted on 12/25/2022 for acute heart failure exacerbation, and atrial fibrillation with RVR.     Assessment:   1) Type II diabetes un controlled (A1c 13.5%) with insulin resistance.  2) Hypoglycemia- multifactorial- related to aggressive insulin titration, NIRAJ on CKD this admission, and fluid shifts resulting in likely improved insulin absorption.    Plan:    -ok to stop continuous D10% running at 50cc/hour.   -Holding basal insulin- anticipate resumption tomorrow   -holding CHO coverage insulin- anticipate resumption tomorrow   Jardiance 25 mg daily AM continued   -Novolog high intensity sliding scale TID AC and HS- not yet ordered,    Addendum 1700: BG now >240- will order sliding scale insulin q4h for now, and transition to AC/HS tomorrow   -BG monitoring q4h (tomorrow can likely go to AC/HS)   -hypoglycemia protocol   -carb counting protocol   -diabetes education needs will be assessed closer to discharge.     Outpatient follow up: will recommend outpatient follow up with [facility] Endocrinology service   Plan discussed with patient, bedside RN, and primary team.        Interval History and Assessment: interval glucose trend reviewed:   BG now stabilized and improving.   D10% reduced from 75 to 50cc/hour this morning.     Pricilla did state today she's eating a lot less here in the hospital. She feels her abdomen (where she does her injections normally) is much less swollen. She's had ~30lbs pulled off since admission. She again today stated that she was adherent to her insulin regimen at home.     Pricilla asked about her thyroid function testing today. She was 1 week without meds PTA. She has been resumed on PTA dosing upon this admission    Current nutritional intake and type: Orders  Placed This Encounter      Combination Diet Moderate Consistent Carb (60 g CHO per Meal) Diet; 2 gm NA Diet; Low Saturated Fat Diet      PTA Diabetes Regimen:   BG monitor: fingerstick  Frequency of checks: 3 times daily  Tuojeo (glargine U300) 225 units once daily  Novolog 100  Units with meals, plus 1/15 correction scale  Trulicity 1mg weekly on Mondays  Jardiance 25 mg daily AM  (has not tolerated Metformin in the past)    Discharge Planning: another 2-4 days.            Diabetes History:   Type of Diabetes: Type 2 Diabetes Mellitus  Lab Results   Component Value Date    A1C 13.5 12/25/2022    A1C >15.0 07/23/2021    A1C >14.0 08/19/2020    A1C 8.4 10/11/2018    A1C 9.6 05/29/2018    A1C 9.0 04/07/2017    A1C 12.1 03/10/2016              Review of Systems:     The Review of Systems is negative other than noted in the Interval History.           Medications:     Current Facility-Administered Medications   Medication     - MEDICATION INSTRUCTIONS -     acetaminophen (TYLENOL) tablet 975 mg     albuterol (PROVENTIL HFA/VENTOLIN HFA) inhaler     albuterol (PROVENTIL) neb solution 2.5 mg     apixaban ANTICOAGULANT (ELIQUIS) tablet 5 mg     atorvastatin (LIPITOR) tablet 40 mg     benzocaine-menthol (CHLORASEPTIC MAX) 15-10 MG lozenge 1 lozenge     bisacodyl (DULCOLAX) suppository 10 mg     bumetanide (BUMEX) injection 5 mg     capsaicin (ZOSTRIX) 0.025 % cream 1 g     capsaicin (ZOSTRIX) 0.025 % cream     cyclobenzaprine (FLEXERIL) half-tab 7.5 mg     glucose gel 15-30 g    Or     dextrose 50 % injection 25-50 mL    Or     glucagon injection 1 mg     empagliflozin (JARDIANCE) tablet 25 mg     gabapentin (NEURONTIN) capsule 600 mg     hydrALAZINE (APRESOLINE) half-tab 12.5 mg     [Held by provider] insulin aspart (NovoLOG) injection (RAPID ACTING)     [Held by provider] insulin aspart (NovoLOG) injection (RAPID ACTING)     [Held by provider] insulin aspart (NovoLOG) injection (RAPID ACTING)     insulin aspart (NovoLOG)  injection (RAPID ACTING)     [Held by provider] insulin glargine (LANTUS PEN) injection 70 Units     isosorbide dinitrate (ISORDIL) tablet 20 mg     levothyroxine (SYNTHROID/LEVOTHROID) tablet 200 mcg     [START ON 12/31/2022] levothyroxine (SYNTHROID/LEVOTHROID) tablet 400 mcg     lidocaine (LMX4) cream     lidocaine 1 % 0.1-1 mL     metoprolol succinate ER (TOPROL XL) 24 hr tablet 50 mg     naloxone (NARCAN) injection 0.2 mg    Or     naloxone (NARCAN) injection 0.4 mg    Or     naloxone (NARCAN) injection 0.2 mg    Or     naloxone (NARCAN) injection 0.4 mg     nicotine (NICODERM CQ) 14 MG/24HR 24 hr patch 1 patch    And     nicotine Patch in Place     oxyCODONE-acetaminophen (PERCOCET) 5-325 MG per tablet 1 tablet     polyethylene glycol (MIRALAX) Packet 17 g     potassium chloride ER (KLOR-CON M) CR tablet 20 mEq     sennosides (SENOKOT) tablet 1 tablet     simethicone (MYLICON) chewable tablet 80 mg     sodium chloride (PF) 0.9% PF flush 10 mL     sodium chloride (PF) 0.9% PF flush 10-20 mL     sodium phosphate 15 mmol in D5W 250 mL intermittent infusion     spironolactone (ALDACTONE) tablet 50 mg     umeclidinium (INCRUSE ELLIPTA) 62.5 MCG/ACT inhaler 1 puff     Facility-Administered Medications Ordered in Other Encounters   Medication     sodium chloride (PF) 0.9% PF flush 10 mL            Physical Exam:    /57   Pulse 91   Temp 97.8  F (36.6  C) (Oral)   Resp 17   Wt (!) 209 kg (460 lb 12.2 oz)   SpO2 99%   BMI 74.37 kg/m    General: pleasant, in no distress, resting in bed.    HEENT: normocephalic, atraumatic. Oral mucous membranes moist.   Lungs: unlabored respiration, no cough  ABD: rounded, nondistended  Skin: warm and dry, no obvious lesion  MSK:  moves all extremities  Lymp:  no LE edema   Mental status:  alert, oriented to self, place, time  Psych:  affect, calm and appropriate interaction             Data:     Recent Labs   Lab 12/28/22  1350 12/28/22  1201 12/28/22  0949 12/28/22  0608  12/28/22  0500 12/28/22  0233   * 195* 81 94 174* 78     Lab Results   Component Value Date    WBC 9.1 12/28/2022    WBC 6.5 12/27/2022    WBC 9.0 12/26/2022    HGB 16.2 (H) 12/28/2022    HGB 15.8 (H) 12/27/2022    HGB 15.1 12/26/2022    HCT 53.2 (H) 12/28/2022    HCT 52.8 (H) 12/27/2022    HCT 50.3 (H) 12/26/2022    MCV 96 12/28/2022    MCV 96 12/27/2022    MCV 97 12/26/2022     12/28/2022     (L) 12/27/2022     (L) 12/26/2022     Lab Results   Component Value Date     (L) 12/28/2022     12/28/2022     (L) 12/27/2022    POTASSIUM 4.8 12/28/2022    POTASSIUM 4.0 12/28/2022    POTASSIUM 4.3 12/27/2022    CHLORIDE 88 (L) 12/28/2022    CHLORIDE 92 (L) 12/28/2022    CHLORIDE 93 (L) 12/27/2022    CO2 36 (H) 12/28/2022    CO2 36 (H) 12/28/2022    CO2 27 12/27/2022     (H) 12/28/2022     (H) 12/28/2022    GLC 81 12/28/2022     Lab Results   Component Value Date    BUN 58.8 (H) 12/28/2022    BUN 58.3 (H) 12/28/2022    BUN 62.0 (H) 12/27/2022     Lab Results   Component Value Date    TSH 14.30 (H) 12/25/2022    TSH 6.79 (H) 09/09/2021    TSH 17.34 (H) 08/19/2020     Lab Results   Component Value Date    AST 47 (H) 12/28/2022    AST 36 (H) 12/27/2022    AST 22 12/26/2022    ALT 36 (H) 12/28/2022    ALT 34 12/27/2022    ALT 33 12/26/2022    ALKPHOS 151 (H) 12/28/2022    ALKPHOS 148 (H) 12/27/2022    ALKPHOS 142 (H) 12/26/2022       35 minutes spent on the date of the encounter doing chart review, history and exam, documentation and further activities per the note      Over 50% of my time on the unit was spent counseling the patient and/or coordinating care regarding acute hyperglycemia management.  See note for details.      Contacting the Inpatient Diabetes Team   From 8AM-4PM: page inpatient diabetes provider that is following the patient, or utilize the job code paging system.   From 4PM-8AM: page the job code for endocrine fellow on call.       Please use the  following job code to reach the Inpatient Diabetes team. Note that you must use an in house phone and that job codes cannot receive text pages.     Dial [phone] (or star-star-star [phone] on the new [employer] telephones), then [phone] to reach the endocrine-diabetes provider on call.    Jammie Chamorro PA-C  Inpatient Diabetes Management Service  Pager [phone]

## 2022-12-28 NOTE — CONSULTS
Care Management Initial Consult    General Information  Assessment completed with: Patient,    Type of CM/SW Visit: Initial Assessment    Primary Care Provider verified and updated as needed: Yes   Readmission within the last 30 days:        Reason for Consult: discharge planning    Communication Assessment  Patient's communication style: spoken language (English or Bilingual)    Hearing Difficulty or Deaf: no   Wear Glasses or Blind: no    Cognitive  Cognitive/Neuro/Behavioral: WDL                      Living Environment:   People in home: alone     Current living Arrangements: apartment      Able to return to prior arrangements: yes     Family/Social Support:  Care provided by: self, other (see comments)  Provides care for: no one, unable/limited ability to care for self  Marital Status: Single  PCA, Sibling(s)          Description of Support System: Supportive, Involved    Support Assessment: Adequate family and caregiver support    Current Resources:   Patient receiving home care services: No   Community Resources: PCA  Equipment currently used at home: cane, straight, walker, rolling  Supplies currently used at home: None (CPAP)    Functional Status:  Prior to admission patient needed assistance:   Dependent ADLs:: Bathing, Dressing, Grooming, Ambulation-walker, Ambulation-cane  Dependent IADLs:: Cooking, Laundry, Shopping, Meal Preparation, Transportation, Cleaning    Additional Information:  Pt admitted with acute on chronic heart failure, still IV diuresing.  This writer met with pt to complete initial assessment due to elevated unplanned readmission risk score.  Pt lives at home alone in an apartment. Pt states she has 12hrs/day of PCA services provided by her caregiver, Hira Mujica. Pt states that she has been having increased difficulty ambulating, was using 4WW with seat or cane and SBA prior to admission.   OT worked with pt yesterday and recommending TCU vs progression to home. Did not discuss TCU on  this date. If able to go home pt agreeable to home care PT/OT. Would prefer [facility] if possible. Will send referral today.   Pt's PCA provides all transportation for patient.    PCA-  Hira Mujica  Phone: [phone]    CADI -  [place]ores  Phone: [phone]    CC will continue to monitor patient's medical condition and progress towards discharge.  Felicity Becerra RN BSN  6C Unit Care Coordinator  Phone number: [phone]  Pager: [phone]

## 2022-12-29 NOTE — PROGRESS NOTES
"SPIRITUAL HEALTH SERVICES  SPIRITUAL ASSESSMENT Progress Note  [facility] 6C    REFERRAL SOURCE: Follow up    Pt reported she was feeling better today and that the bible that  had brought her has been helpful and \"a comfort.\"  Pt said she has gotten some more answers about what is going on medically and she is hopeful that the doctors will continue to treat her effectively.     PLAN: [facility] will remain available and continue to support pt as needed     Dorene Lopez  Pager: [phone]    "

## 2022-12-29 NOTE — PLAN OF CARE
Neuro: A&Ox4. Pleasant and cooperative   Cardiac: Afebrile, VSS. Afib in 100s -110s. Lymphedema wraps in place, changed today.   Respiratory: RA, MURRAY  GI/: Keenan in place with adequate output. Last BM 12/27 per patient. Refused scheduled bowel meds today  Diet/appetite: Tolerating cardiac and mod carb diet, 1.5L FR. Denies nausea. Q4hr blood glucose, sliding scale and carb coverage insulin  Activity: Up with lift assist; working with therapies awaiting asha chair    Pain: Reporting gasper LE pain and back pain; Voltaren gel and PRN percocet effective  Skin: moisture associated area under R breast and buttocks; antifungal powder applied after bath  Lines: L midline x2, R PIV  Drains: Keenan  Replacements: Mag, K, and phos protocols ordered; Mag 1.8 and phos 2.7 replaced     Bumex on hold due to NIRAJ, plan to transition to PO bumex. TCU vs home with HH on discharge. Continue to monitor pt status and report changes to Cards 2.     Problem: Bariatric Environmental Safety  Goal: Safety Maintained with Care  Outcome: Progressing     Problem: Heart Failure  Goal: Stable Heart Rate and Rhythm  Outcome: Progressing  Goal: Fluid and Electrolyte Balance  Outcome: Progressing  Goal: Effective Oxygenation and Ventilation  Outcome: Progressing

## 2022-12-29 NOTE — PROGRESS NOTES
[facility]   Cardiology II Service / Advanced Heart Failure  Daily Progress Note  Date of Service: 12/26/2022    Patient: Pricilla Brown  MRN: [id number]  Admission Date: 12/25/2022  Hospital Day # 4    Assessment and Plan:  Pricilla Brown is a 47 year old female w/ PMHx significant for NICM (EF 40-45%), paroxysmal afib (on Eliquis), ID-T2DM, HTN, JYOTI, neuropathy, morbid obesity who presented to the ED on 12/25/2022 due to chest discomfort, palpitations, volume overload concerning for heart failure exacerbation and afib w/ RVR. Hospital course complicated by uncontrolled blood sugar.    Changes Today:  - hold bumex in the setting of NIRAJ, potential PO bumex tomorrow  -PT ordered     Acute Issues:  Acute on chronic HFrEF (EF 40-45%) 2/2 NICM (Stage C, NYHA Class IIII)  Patient presents with sxs of volume overload, including SOB, MURRAY, PND, orthopnea, increased abdominal girth, 3+ BLE pitting edema. NTproBNP 2788. CXR w/ pulmonary edema. Suspect 2/2 dietary and medication noncompliance vs. afib w/ RVR. Unclear dry weight, although weight at discharge from prior hospital admission (10/1/2022) was 462 lbs. Admission weight 515 lbs.  - Diuresis: Bumex 5mg IV BID; HOLD on 12/29 (did get one AM dose   - Goal net negative 3-5L/day  - GDMT              - ACEi/ARB/ARNi: continue PTA hydralazine 12.5mg TID, Isordil 20mg TID               - Aldosterone Inhibitor: continue PTA spironolactone 50mg daily              - Beta blocker: continue PTA metoprolol succinate 50mg daily               - SGLT2i: continue PTA empagliflozin 25mg daily  - SCD: none  - Will consider repeat TTE once HR more controlled (most recent in 6/2022)  - Telemetry  - Replete K > 4, Mg > 2 (RN repletion protocol ordered)  - Strict I/O  - Daily weights  - Na (2g) and fluid (< 1.5L/d) restricted diet  - Lower extremity wraps    NIRAJ CKD III, stable  Cr 1.62 on admission. Baseline Cr approximately 1.3-1.4. Cr up to 1.82 on 12/29. Uptrend in  Cr since 12/27, in the setting of excellent urine output, suggests NIRAJ from overdiuresis. Hold Bumex per above  - Trend Cr  - Avoid nephrotoxins    Atrial fibrillation w/ RVR  [id number] 4. Patient has not been taking PTA metoprolol for a few days.  - Continue PTA apixaban 5mg BID  - Continue PTA metoprolol succinate 50mg daily  - If sustained HR > 130s, will start amiodarone gtt    Insulin-dependent T2DM, uncontrolled c/b neuropathy  A1c 13.5%. Patient does not take insulin consistently at home. Unclear if insulin doses listed in home med list are accurate; started with lower doses similar to those at last hospitalization. Patient confirms that she takes her PTA insulin as prescribed, although later admitted that she usually eats significantly more at home. Blood sugar has been labile throughout admission.  - Endocrinology consulted; recs appreciated   Initially developped hypoglycemia necessitating D10 gtt. Now on lantus 45 daily, HDSSI, Meal time correcting insulin based on carb count  - Hold PTA insulin regimen: glargine (Toujeo) 225U in AM; aspart 100U TID  - Hold PTA semaglutide     Chronic/Resolved Issues:  Elevated troponin 2/2 AoC HF and afib w/ RVR, peaked  Troponin 72 > 74 > 67. Low suspicion for ACS given resolution of chest pain and no ischemic changes seen on EKG. Chest pain lasts for seconds then dissipates and occurs with palpitations, likely representing discomfort from significant volume overload.    Hypothyroidism, uncontrolled  TSH 14.3 and fT4 0.53 on admission. Patient states that she has not taken PTA levothyroxine in > 1 week.  - Continue PTA levothyroxine 200mcg daily (400mcg on Sundays)     HTN  - Hold PTA hydrochlorothiazide 12.5mg BID     COPD  - Continue PTA inhalers     JYOTI  - CPAP ordered     Diet: Cardiac and carb control, 1.5L fluid restriction  DVT ppx: Eliquis  Code Status: Full (confirmed with patient on admission)  Discharge plan: Inpatient admission for further diuresis and HR  control     Patient was discussed with attending physician, Dr. Walters.     Cruz Aguirre   Cardiology Fellow  This note was created using Dragon dictation software, so please excuse any mistakes and incorrect syntax and semantics.    ================================================================    Interval History:      Discontinued D10 gtt and High dose sliding scale started yesterday evening per endocrinology. Long acting mealtime insulin added back this a.m. by endocrinology. Net -6.6 L yesterday.    Telemetry: a fib with RVR and the 110s    Patient feel significantly better today. States she is able to move her legs on her own, breathing is improved, chest congestion improved.  Medications: Reviewed.     Physical Exam:   Temp:  [97.4  F (36.3  C)-97.8  F (36.6  C)] 97.6  F (36.4  C)  Pulse:  [] 118  Resp:  [17-20] 20  BP: ()/(37-94) 123/55  FiO2 (%):  [21 %] 21 %  SpO2:  [93 %-100 %] 96 %  GENERAL: No acute distress.  HEENT: Bilateral eye erythema. EOMI.  NECK: Supple.   CV: S1/S2 heard without murmur. Distant heart sounds.  RESPIRATORY: Basilar crackles. No wheezing.  GI: Soft and non-distended with normoactive bowel sounds present in all quadrants. No tenderness, rebound, guarding.  EXTREMITIES: 3+ BLE edema.  NEUROLOGIC: Alert and orientated x 3. CN II-XII grossly intact. No focal deficits.  SKIN: No jaundice. No acute rashes or lesions.    Vitals:    12/27/22 0600 12/28/22 0700 12/29/22 0600   Weight: (!) 225 kg (496 lb 0.6 oz) (!) 209 kg (460 lb 12.2 oz) (!) 204 kg (449 lb 11.8 oz)        I/O last 3 completed shifts:  In: 2486.25 [P.O.:1520; I.V.:966.25]  Out: 7350 [Urine:7350]    Peripheral IV 12/27/22 Anterior;Right Lower forearm (Active)   Site Assessment WDL 12/29/22 0000   Line Status Saline locked 12/29/22 0000   Dressing Intervention New dressing  12/27/22 0616   Phlebitis Scale 0-->no symptoms 12/29/22 0000   Infiltration Scale 0 12/29/22 0000   Number of days: 2       Midline Catheter  Double Lumen Left Basilic (Active)   Site Assessment WDL 12/29/22 0000   External Cath Length (cm) 2 cm 12/28/22 1036   Dressing Chlorhexidine disk;Transparent 12/29/22 0000   Dressing Status clean;dry;intact 12/29/22 0000   Dressing Intervention dressing changed 12/28/22 1036   Dressing Change Due 01/04/22 12/28/22 2000   Line Necessity Yes, meets criteria 12/29/22 0000   Purple - Status saline locked 12/29/22 0000   Purple - Cap Change Due 12/30/22 12/28/22 2000   Purple - Intervention Flushed 12/28/22 2000   Red - Status saline locked 12/29/22 0000   Red - Cap Change Due 12/30/22 12/28/22 2000   Red - Intervention Flushed 12/28/22 2000   Phlebitis Scale 0-->no symptoms 12/29/22 0000   Infiltration? no 12/29/22 0000   Number of days: 2       Urethral Catheter 12/27/22 (Active)   Tube Description Positional 12/29/22 0052   Catheter Care Catheter wipes;Done 12/28/22 1636   Collection Container Standard 12/29/22 0400   Securement Method Securing device (Describe) 12/29/22 0400   Rationale for Continued Use Strict 1-2 Hour I&O 12/29/22 0400   Urine Output 300 mL 12/29/22 0600   Number of days: 2     Labs:  ABG:  Lab Results   Component Value Date    PH 7.39 12/25/2022          Lab Results   Component Value Date     12/26/2022     12/25/2022     10/31/2022     06/03/2021     08/19/2020     10/29/2019    Lab Results   Component Value Date    CHLORIDE 95 12/26/2022    CHLORIDE 94 12/25/2022    CHLORIDE 98 10/31/2022    CHLORIDE 103 06/15/2022    CHLORIDE 101 10/20/2021    CHLORIDE 98 09/09/2021    CHLORIDE 98 06/03/2021    CHLORIDE 95 08/19/2020    CHLORIDE 102 10/29/2019    Lab Results   Component Value Date    BUN 64.2 12/26/2022    BUN 70.5 12/25/2022    BUN 44.8 10/31/2022    BUN 17 06/15/2022    BUN 14 10/20/2021    BUN 18 09/09/2021    BUN 13 06/03/2021    BUN 14 08/19/2020    BUN 14 10/29/2019      Lab Results   Component Value Date    POTASSIUM 4.0 12/26/2022    POTASSIUM 4.1  12/25/2022    POTASSIUM 3.5 10/31/2022    POTASSIUM 3.5 06/15/2022    POTASSIUM 4.3 10/20/2021    POTASSIUM 3.7 09/09/2021    POTASSIUM 4.2 06/03/2021    POTASSIUM 4.4 08/19/2020    POTASSIUM 3.6 10/29/2019    Lab Results   Component Value Date    CO2 29 12/26/2022    CO2 30 12/25/2022    CO2 31 10/31/2022    CO2 29 06/15/2022    CO2 30 10/20/2021    CO2 28 09/09/2021    CO2 25 06/03/2021    CO2 26 08/19/2020    CO2 30 10/29/2019    Lab Results   Component Value Date    CR 1.47 12/26/2022    CR 1.62 12/25/2022    CR 1.58 10/31/2022    CR 0.89 06/03/2021    CR 1.00 08/19/2020    CR 1.25 10/29/2019        Lab Results   Component Value Date    WBC 9.1 12/28/2022    WBC 6.5 12/27/2022    WBC 9.0 12/26/2022    HGB 16.2 (H) 12/28/2022    HGB 15.8 (H) 12/27/2022    HGB 15.1 12/26/2022    HCT 53.2 (H) 12/28/2022    HCT 52.8 (H) 12/27/2022    HCT 50.3 (H) 12/26/2022    MCV 96 12/28/2022    MCV 96 12/27/2022    MCV 97 12/26/2022     12/28/2022     (L) 12/27/2022     (L) 12/26/2022     Lab Results   Component Value Date    AST 47 (H) 12/28/2022    AST 36 (H) 12/27/2022    AST 22 12/26/2022    ALT 36 (H) 12/28/2022    ALT 34 12/27/2022    ALT 33 12/26/2022    ALKPHOS 151 (H) 12/28/2022    ALKPHOS 148 (H) 12/27/2022    ALKPHOS 142 (H) 12/26/2022    BILITOTAL 0.7 12/28/2022    BILITOTAL 1.1 12/27/2022    BILITOTAL 1.3 (H) 12/26/2022    BILICONJ 0.0 10/17/2013     Lab Results   Component Value Date    INR 1.36 (H) 12/25/2022    INR 0.99 08/19/2020    INR 1.94 (H) 10/29/2019

## 2022-12-29 NOTE — PLAN OF CARE
Hours of Care:  2300 - 0700    Temp:  [97.4  F (36.3  C)-97.8  F (36.6  C)] 97.6  F (36.4  C)  Pulse:  [] 55  Resp:  [14-20] 20  BP: ()/(37-94) 109/94  FiO2 (%):  [21 %] 21 %  SpO2:  [93 %-100 %] 95 %     D: Pricilla Brown is a 47 year old female w/ PMHx significant for NICM (EF 40-45%), paroxysmal afib (on Eliquis), ID-T2DM, HTN, JYOTI, neuropathy, morbid obesity who presented to the ED on 12/25/2022 due to chest discomfort, palpitations, volume overload concerning for heart failure exacerbation and afib w/ RVR. Hospital course complicated by uncontrolled blood sugar.    I: Monitored vitals and assessed pt status.     PRN: Percocet, Tylenol, Chloraseptic lozenge, simethicone  Tele: Atrial fibrillation  O2: Room air during day.  CPAP at HS.  Mobility: A2 with total lift    A: Neuro: A/O x4.  Call light appropriate.  Able to make needs known.  Respiratory:  On room air.  Denies shortness of breath at rest.  CPAP applied at HS.  Pt has been compliant with CPAP overnight.  Cardiac: VSS.  Atrial fibrillation.  Receives apixaban BID.  No s/s of bleeding.  Lymphedema wraps to bilateral lower extremities.  Reported discomfort on prior shift.  Top portion of wrap loosened.  No reports of discomfort overnight.  Receives Bumex IVP BID.  GI: Last BM 12/28.  No report of nausea or vomiting.  : Keenan catheter in place.  2800 mL pale yellow output this shift  Endo:  Blood sugars Q4H.  Last   Skin:  See PCS for assessment and treatment of wounds and surgical incisions.  LDA:  Midline LUE, 20 G PIV RFA  Electrolytes: RN managed K, Mg, P.  Lab pending.  Pain: Reports 10/10 pain.  Does not appear to be in any distress.  PRN percocet administered per pt request  Isolation:  Standard Precautions  Tests/procedures: None performed overnight.  Diet: 2 g Na.  2000 mL fluid restriction  Social:    P: Continue to monitor Pt status and report changes to Cards 2.  Plan to discharge TCU vs. Home with PT/OT when medically  appropriate.      Intake/Output Summary (Last 24 hours) at 12/29/2022 0419  Last data filed at 12/29/2022 0052  Gross per 24 hour   Intake 2486.25 ml   Output 8400 ml   Net -5913.75 ml

## 2022-12-29 NOTE — PROGRESS NOTES
"                          Diabetes Consult Daily  Progress Note          Assessment/Plan:     HPI:  Pricilla is a 47 year old female with a PMHx non-ischemic CM, paroxysmal atrial fibrillation, insulin dependent type 2 diabetes, HTN, JYOTI, morbid obesity who was admitted on 12/25/2022 for acute heart failure exacerbation, and atrial fibrillation with RVR.      Assessment:     1) Type II diabetes un controlled (A1c 13.5%) with insulin resistance.  2) Hypoglycemia- multifactorial- related to aggressive insulin titration, NIRAJ on CKD this admission, and fluid shifts resulting in likely improved insulin absorption.  3) Morbid obesity; BMI 74  4) LE edema         Plan:                  - re-start Lantus 45 unit(s) this AM at 0800 - plan for increase to 55 unit(s) in AM - given severe hypoglycemia hx will incrementally increase in step-wise fashion                 - resume Novolog meal insulin 1:6 g cho AC meals/snacks -> increase to 1:5 g cho at dinner                 - Jardiance 25 mg daily AM continued                 - Novolog high intensity sliding scale TID AC and HS - increase to custom 2/30 to start at dinner                 - BG monitoring TID AC/HS                 - hypoglycemia protocol                 - carb counting protocol                 - diabetes education needs will be assessed closer to discharge.       - *recommend Podiatry consult for diabetes foot care - if available while in-patient.     Plan discussed with patient, bedside RN/primary team via this note.         Interval History:     The last 24 hours progress and nursing notes reviewed.      BG trend:       Pricilla is feeling \"so much better\" with the diuresis.  Still pain to LE/feet.   Abd pain/pressure and SOB resolved. C/o \"scratchy\" throat, denies difficulty with swallowing or wheezing.  Has lozenges which are helping.       Planned Procedures/surgeries: none  D5W-containing solutions/medications: sodium phos in D5W      Recent Labs   Lab " 12/29/22  0451 12/29/22  0043 12/28/22 2015 12/28/22  1647 12/28/22  1350 12/28/22  1201   * 246* 250* 246* 162* 195*           Nutrition:     Orders Placed This Encounter      Combination Diet Moderate Consistent Carb (60 g CHO per Meal) Diet; 2 gm NA Diet; Low Saturated Fat Diet        PTA Regimen:     BG monitor: fingerstick  Frequency of checks: 3 times daily  Tuojeo (glargine U300) 225 units once daily  Novolog 100  Units with meals, plus 1/15 correction scale  Trulicity 1mg weekly on Mondays  Jardiance 25 mg daily AM  (has not tolerated Metformin in the past)          Review of Systems:   CC: see interval hx         Medications:   Steroid planning:  no  Tube Feeding: no       Physical Exam:   BP (!) 83/69 (BP Location: Right arm)   Pulse 98   Temp 97.6  F (36.4  C) (Oral)   Resp 19   Wt (!) 204 kg (449 lb 11.8 oz)   SpO2 95%   BMI 72.59 kg/m      General:   A&O, NAD, pleasant, resting comfortably. Well nourished   HEENT:  NC/AT. MMM, EOMI, Anicteric. Hearing WNL.   Lungs:  unremarkable, no new cough, no SOB  ABD:   Rounded, obese   Extremities:  Moving all extremities, ++ edema - lymphedema wraps - long/thickened toenails  Skin:  warm and dry, no obvious lesions/rash/bleeding  Neuro:  No focal neurological deficits  Psych:   Cooperative, appropriate mood, good eye contact, congruent affect          Data:     Lab Results   Component Value Date    A1C 13.5 12/25/2022    A1C >15.0 07/23/2021    A1C >14.0 08/19/2020    A1C 8.4 10/11/2018    A1C 9.6 05/29/2018    A1C 9.0 04/07/2017    A1C 12.1 03/10/2016        CBC RESULTS: Recent Labs   Lab Test 12/29/22  0806   WBC 6.9   RBC 5.17   HGB 15.0   HCT 50.3*   MCV 97   MCH 29.0   MCHC 29.8*   RDW 17.3*   *       Recent Labs   Lab Test 12/29/22  0451 12/29/22  0043 12/28/22 1647 12/28/22  1350 12/28/22  0949 12/28/22  0608   NA  --   --   --  135*  --  137   POTASSIUM  --   --   --  4.8  --  4.0   CHLORIDE  --   --   --  88*  --  92*   CO2  --   --    --  36*  --  36*   ANIONGAP  --   --   --  11  --  9   * 246*   < > 162*   < > 94   BUN  --   --   --  58.8*  --  58.3*   CR  --   --   --  1.72*  --  1.65*   JUSTIN  --   --   --  9.0  --  8.8    < > = values in this interval not displayed.     Liver Function Studies -   Recent Labs   Lab Test 12/28/22  0608   PROTTOTAL 6.6   ALBUMIN 2.6*   BILITOTAL 0.7   ALKPHOS 151*   AST 47*   ALT 36*     Lab Results   Component Value Date    INR 1.36 12/25/2022    INR 0.99 08/19/2020    INR 1.94 10/29/2019    INR 3.49 07/23/2019    INR 2.29 06/26/2019    INR 3.02 03/18/2019    INR 2.42 03/13/2019    INR 1.21 02/19/2019    INR 3.70 01/07/2019    INR 3.24 11/30/2018    INR 2.13 10/30/2018    INR 2.99 10/24/2018    INR 2.18 10/11/2018     YOCASTA Schwarz CNP   Inpatient Diabetes Management Service  Pager - [phone]  Available on [employer]     To contact Endocrine Diabetes service:   From 8AM-4PM: page inpatient diabetes provider who is following the patient that day (see filed or incomplete progress notes/consult notes).  If uncertain of provider assignment: page job code [phone]. (To page job code in-house dial 3 stars, 777 then enter number).  For questions or updates AFTER HOURS from 4PM-8AM: page the diabetes job code for on call fellow: [phone]    Please notify inpatient diabetes service if changes are planned to steroids, nutrition, or if procedures are planned requiring prolonged NPO status.Diabetes Management Team job code: [email or web address]    I spent a total of 35 minutes on the date of the encounter doing chart review, history and exam, documentation and further activities per the note.  Over 50% of my time on the unit was spent counseling the patient and/or coordinating care regarding acute hyperglycemic management.  See note for details.

## 2022-12-29 NOTE — PROGRESS NOTES
"I saw Pricilla in her room today to schedule follow up with [facility]. Pricilla is an established patient in the [facility].     I reviewed the importance of daily weights, 2 gm sodium diet, 2L fluid restriction and compliance with medications upon hospital discharge.  [facility] contact phone numbers provided and patient is encouraged to call with any questions or concerns, including any weight gain or loss of 2 or more pounds in 24 hours or 5 or more pounds in 1 week.      Pricilla asked about options for getting a scale as she doesn't have one that works right now. Doesn't have the ability to get one. I told her I would review with clinic providers that know her to see if she would be a candidate for H[facility] Remote Monitoring which would provide a scale and blood pressure cuff. Will follow up on this as outpatient.     Appointment made in [facility] clinic on  at 11:45 with labs prior.  I will follow-up with the patient at that time, sooner if needed.  Thank you for the consult.    Amelia Lowry RN, CHFN  Cardiology Care Coordinator - Heart Failure/ [facility]   Questions and [phone]   First press #1 for the [employer] and then press #4 for \"Send a message to your care team\"    "

## 2022-12-30 NOTE — DISCHARGE INSTRUCTIONS
________________________________________________________  Discharge RN please fax discharge orders to home care agency:  [facility]   F:[phone]  ________________________________________________________     Diabetes medications per endocrinology:  -Toujeo 40 units at bedtime  -Aspart 10 units with each meal + additional correction scale insulin as follows based on pre-meal glucose      -140 - 160: + 1 unit     -161 - 180: + 2 units     -181 - 200: + 3 units, and so on  -Resume Ozempic 1 mg weekly  -Do not resume empagliflozin (given history of UTIs)    You will need to follow up with the heart failure team and the endocrinology team. You should be hearing from them next week to schedule an appointment.

## 2022-12-30 NOTE — PLAN OF CARE
D/I/A: Pt A+O xx4 and making needs known.  Incontinent of stool x1 this evening. VSS, afebrile.  Pleasant and cooperative.  Keenan patent with large amount of output this evening.  Medicated for leg and back pain effectively with current med orders.  Mindful of fluid restriction.  P: Continue to monitor status.

## 2022-12-30 NOTE — PLAN OF CARE
Neuro: A&Ox4. Pleasant and cooperative       Cardiac: Afebrile, VSS. Afib in 100s -110s. Lymphedema wraps replaced today and removed late this evening per patient due to pain.   Respiratory: RA, MURRAY, CPAP while sleeping  GI/: Keenan in place with adequate output. BMx2 today, to commode and one incontinent  Diet/appetite: Tolerating cardiac and mod carb diet, 1.5L FR. Denies nausea. ACHS blood glucose, sliding scale and carb coverage insulin with meals, lantus tonight. Low blood sugars x2, orders were adjusted.  Activity: Up with assist x2 walker and GB; lift assist for repositioning. working with therapies  Pain: Reporting gasper LE pain and back pain 5/10; Voltaren gel and PRN percocet x1 effective  Skin: moisture associated area under R breast and buttocks. antifungal powder applied after bath; healing pimpel? and abrasion to R hip, mepilex x2 placed.   Lines: L midline x2, R PIV  Drains: Keenan  Replacements: Mag, K, and phos protocols ordered;      Bumex on hold due to NIRAJ, plan to transition to PO bumex eventually. TCU vs home with HH PT/OT on discharge. Continue to monitor pt status and report changes to Cards 2.        Problem: Hyperglycemia  Goal: Blood Glucose Level Within Targeted Range  Outcome: Progressing    Problem: Heart Failure  Goal: Stable Heart Rate and Rhythm  Outcome: Progressing  Goal: Optimal Functional Ability  Outcome: Progressing  Goal: Effective Breathing Pattern During Sleep  Outcome: Progressing

## 2022-12-30 NOTE — PROGRESS NOTES
"IP Diabetes Management  Daily Note           Assessment and Plan:   HPI:  Pricilla is a 47 year old female with a PMHx non-ischemic CM, paroxysmal atrial fibrillation, insulin dependent type 2 diabetes, HTN, JYOTI, morbid obesity who was admitted on 12/25/2022 for acute heart failure exacerbation, and atrial fibrillation with RVR.     Assessment:   1) Type II diabetes: uncontrolled (A1c 13.5%) with insulin resistance.  2) Hypoglycemia- multifactorial- related to aggressive insulin titration, NIRAJ on CKD this admission, and fluid shifts resulting in likely improved insulin absorption.  3) Morbid obesity; BMI 74    Plan:    -Lantus reduced from 45 to 20 units daily, and delay to HS dosing (for tonight, also placed communication to give \"only if HS BG is >180)   -Jardiance placed on hold by primary team   -Novolog reduced from 1:5g to 1:12g CHO, beginning with dinner (and placed communication to hold if pre meal BG is less than or equal to 80mg/dL)   -Novolog custom 2/30 intensity sliding scale TID AC and HS   -BG monitoring TID AC, HS, 0200   -her PTA Trulicity is on hold, and last dose >1 week ago.   -hypoglycemia protocol   -carb counting protocol   -diabetes education needs will be assessed closer to discharge.     Outpatient follow up: with [facility] endocrinology- have yet to place follow up request.     Plan discussed with patient, bedside RN, and primary team.        Interval History and Assessment: interval glucose trend reviewed:   BG above target yesterday- modest increases to insulin dosing and now fasting BG below target at 61> 78 mg/dL this morning.     Saw Pricilla after two therapy sessions (walked in halls);  Pricilla is feeling very tired, falling asleep during our conversation today. She had just consumed 64g CHO for lunch. Had not yet received CHO coverage. Asked for spot check of BG and was 101- so carb coverage held.     Renal function slightly worse today: GFR 36>30. Diuresis is on hold. Has diureses 61 (?!) " lbs this admission.   Afib with RVR.    Per notes, therapies are recommending TCU.    Current nutritional intake and type: Orders Placed This Encounter      Combination Diet Moderate Consistent Carb (60 g CHO per Meal) Diet; 2 gm NA Diet; Low Saturated Fat Diet      PTA Diabetes Regimen:   BG monitor: fingerstick  Frequency of checks: 3 times daily  Tuojeo (glargine U300) 225 units once daily  Novolog 100  Units with meals, plus 1/15 correction scale  Trulicity 1mg weekly on Mondays  Jardiance 25 mg daily AM  (has not tolerated Metformin in the past)    Discharge Planning: TBD, notes indicating plan for TCU discharge.            Diabetes History:   Type of Diabetes: Type 2 Diabetes Mellitus  Lab Results   Component Value Date    A1C 13.5 12/25/2022    A1C >15.0 07/23/2021    A1C >14.0 08/19/2020    A1C 8.4 10/11/2018    A1C 9.6 05/29/2018    A1C 9.0 04/07/2017    A1C 12.1 03/10/2016              Review of Systems:     The Review of Systems is negative other than noted in the Interval History.           Medications:     Current Facility-Administered Medications   Medication     acetaminophen (TYLENOL) tablet 975 mg     albuterol (PROVENTIL HFA/VENTOLIN HFA) inhaler     albuterol (PROVENTIL) neb solution 2.5 mg     apixaban ANTICOAGULANT (ELIQUIS) tablet 5 mg     atorvastatin (LIPITOR) tablet 40 mg     benzocaine-menthol (CHLORASEPTIC MAX) 15-10 MG lozenge 1 lozenge     bisacodyl (DULCOLAX) suppository 10 mg     capsaicin (ZOSTRIX) 0.025 % cream 1 g     capsaicin (ZOSTRIX) 0.025 % cream     cyclobenzaprine (FLEXERIL) half-tab 7.5 mg     glucose gel 15-30 g    Or     dextrose 50 % injection 25-50 mL    Or     glucagon injection 1 mg     diclofenac (VOLTAREN) 1 % topical gel 2 g     [Held by provider] empagliflozin (JARDIANCE) tablet 25 mg     gabapentin (NEURONTIN) capsule 600 mg     hydrALAZINE (APRESOLINE) half-tab 12.5 mg     insulin aspart (NovoLOG) injection (RAPID ACTING)     insulin aspart (NovoLOG) injection  (RAPID ACTING)     insulin aspart (NovoLOG) injection (RAPID ACTING)     insulin aspart (NovoLOG) injection (RAPID ACTING)     insulin glargine (LANTUS PEN) injection 20 Units     isosorbide dinitrate (ISORDIL) tablet 20 mg     levothyroxine (SYNTHROID/LEVOTHROID) tablet 200 mcg     [START ON 12/31/2022] levothyroxine (SYNTHROID/LEVOTHROID) tablet 400 mcg     lidocaine (LMX4) cream     lidocaine 1 % 0.1-1 mL     metoprolol succinate ER (TOPROL XL) 24 hr tablet 50 mg     miconazole (MICATIN) 2 % powder     naloxone (NARCAN) injection 0.2 mg    Or     naloxone (NARCAN) injection 0.4 mg    Or     naloxone (NARCAN) injection 0.2 mg    Or     naloxone (NARCAN) injection 0.4 mg     nicotine (NICODERM CQ) 14 MG/24HR 24 hr patch 1 patch    And     nicotine Patch in Place     oxyCODONE-acetaminophen (PERCOCET) 5-325 MG per tablet 1 tablet     polyethylene glycol (MIRALAX) Packet 17 g     [Held by provider] potassium chloride ER (KLOR-CON M) CR tablet 20 mEq     sennosides (SENOKOT) tablet 1 tablet     simethicone (MYLICON) chewable tablet 80 mg     sodium chloride (PF) 0.9% PF flush 10 mL     sodium chloride (PF) 0.9% PF flush 10-20 mL     [Held by provider] spironolactone (ALDACTONE) tablet 50 mg     umeclidinium (INCRUSE ELLIPTA) 62.5 MCG/ACT inhaler 1 puff     Facility-Administered Medications Ordered in Other Encounters   Medication     sodium chloride (PF) 0.9% PF flush 10 mL            Physical Exam:    BP 93/65   Pulse 108   Temp 97.9  F (36.6  C) (Oral)   Resp 17   Wt (!) 206 kg (454 lb 2.4 oz)   SpO2 98%   BMI 73.30 kg/m    General: pleasant, in no distress, sitting up in chair.    HEENT: normocephalic, atraumatic. Oral mucous membranes moist.   Lungs: unlabored respiration, no cough  ABD: rounded, nondistended  Skin: warm and dry, no obvious lesions  MSK:  moves all extremities  Lymp:  no LE edema   Mental status:  alert, oriented to self, place, time, but sleepy.  Psych:  affect, calm and appropriate  interaction             Data:     Recent Labs   Lab 12/30/22  1335 12/30/22  1326 12/30/22  1231 12/30/22  0732 12/30/22  0634 12/29/22  2136   * 76 64* 78 61* 164*     Lab Results   Component Value Date    WBC 4.3 12/30/2022    WBC 6.9 12/29/2022    WBC 9.1 12/28/2022    HGB 15.0 12/30/2022    HGB 15.0 12/29/2022    HGB 16.2 (H) 12/28/2022    HCT 50.2 (H) 12/30/2022    HCT 50.3 (H) 12/29/2022    HCT 53.2 (H) 12/28/2022    MCV 98 12/30/2022    MCV 97 12/29/2022    MCV 96 12/28/2022     (L) 12/30/2022     (L) 12/29/2022     12/28/2022     Lab Results   Component Value Date     (L) 12/30/2022     12/30/2022     (L) 12/29/2022    POTASSIUM 4.7 12/30/2022    POTASSIUM 5.0 12/30/2022    POTASSIUM 4.4 12/29/2022    CHLORIDE 89 (L) 12/30/2022    CHLORIDE 90 (L) 12/30/2022    CHLORIDE 90 (L) 12/29/2022    CO2 36 (H) 12/30/2022    CO2 36 (H) 12/30/2022    CO2 34 (H) 12/29/2022     (H) 12/30/2022    GLC 76 12/30/2022    GLC 64 (L) 12/30/2022     Lab Results   Component Value Date    BUN 58.5 (H) 12/30/2022    BUN 60.9 (H) 12/30/2022    BUN 58.9 (H) 12/29/2022     Lab Results   Component Value Date    TSH 14.30 (H) 12/25/2022    TSH 6.79 (H) 09/09/2021    TSH 17.34 (H) 08/19/2020     Lab Results   Component Value Date    AST 36 (H) 12/30/2022    AST 43 (H) 12/29/2022    AST 47 (H) 12/28/2022    ALT 34 12/30/2022    ALT 39 (H) 12/29/2022    ALT 36 (H) 12/28/2022    ALKPHOS 162 (H) 12/30/2022    ALKPHOS 161 (H) 12/29/2022    ALKPHOS 151 (H) 12/28/2022     35 minutes spent on the date of the encounter doing chart review, history and exam, documentation and further activities per the note      Over 50% of my time on the unit was spent counseling the patient and/or coordinating care regarding acute hyperglycemia management.  See note for details.      Contacting the Inpatient Diabetes Team   From 8AM-4PM: page inpatient diabetes provider that is following the patient, or utilize  the job code paging system.   From 4PM-8AM: page the job code for endocrine fellow on call.       Please use the following job code to reach the Inpatient Diabetes team. Note that you must use an in house phone and that job codes cannot receive text pages.     Dial [phone] (or star-star-star [phone] on the new [employer] telephones), then [phone] to reach the endocrine-diabetes provider on call.    Jammie Chamorro PA-C  Inpatient Diabetes Management Service  Pager [phone]

## 2022-12-30 NOTE — PROGRESS NOTES
"IP PT Evaluation:     Gibran Carroll PT  Pager #[phone]     12/30/22 1202   Appointment Info   Signing Clinician's Name / Credentials (PT) Gibran Carroll PT, DPT   Living Environment   People in Home alone  (Caregiver for most of the day, everyday)   Current Living Arrangements apartment   Home Accessibility stairs to enter home   Number of Stairs, Main Entrance 4   Stair Railings, Main Entrance railings safe and in good condition;railings on both sides of stairs   Transportation Anticipated family or friend will provide   Living Environment Comments Pt lives in an apartment with 4 OCTAVIO. Has tub shower with bench. No GB's.   Self-Care   Usual Activity Tolerance moderate   Current Activity Tolerance fair   Equipment Currently Used at Home cane, straight;walker, rolling   Fall history within last six months yes   Number of times patient has fallen within last six months 2   Activity/Exercise/Self-Care Comment Pt req assist with I/ADLs. Ambulates with walker or cane.   General Information   Onset of Illness/Injury or Date of Surgery 12/25/22   Referring Physician Cruz Aguirre MD   Patient/Family Therapy Goals Statement (PT) To get home   Pertinent History of Current Problem (include personal factors and/or comorbidities that impact the POC) Per EMR: \"Pricilla Brown is a 47 year old female w/ PMHx significant for NICM (EF 40-45%), paroxysmal afib (on Eliquis), ID-T2DM, HTN, JYOTI, neuropathy, morbid obesity who presented to the ED on 12/25/2022 due to chest discomfort, palpitations, volume overload concerning for heart failure exacerbation and afib w/ RVR. Hospital course complicated by uncontrolled blood sugar.\"   Existing Precautions/Restrictions cardiac;fall   General Observations Pt supine, pleasant, motivated to participate.   Cognition   Affect/Mental Status (Cognition) WNL   Follows Commands (Cognition) WNL   Posture    Posture Forward head position;Protracted shoulders   Range of Motion (ROM)   Range of Motion ROM " [facility]  Anesthesia Pre-Operative Evaluation         Patient Name: Yovani Finn  YOB: 2016  MRN: [id number]    SUBJECTIVE:     Pre-operative evaluation for Procedure(s) (LRB):  TONSILLECTOMY-ADENOIDECTOMY (T AND A) (Bilateral)     03/01/2018    Yovani Finn is a 1 y.o. male w/ a significant PMHx of adenotonsillar hypertrophy, DAVE and bilateral cerumen impaction.    Patient now presents for the above procedure(s).      LDA: None documented.       Prev airway: None documented.    Drips: None documented.      Patient Active Problem List   Diagnosis   (none) - all problems resolved or deleted       Review of patient's allergies indicates:  No Known Allergies    Current Inpatient Medications:      No current facility-administered medications on file prior to encounter.      Current Outpatient Prescriptions on File Prior to Encounter   Medication Sig Dispense Refill    hydrocortisone 2.5 % cream Apply topically 2 (two) times daily. (Patient taking differently: Apply topically 2 (two) times daily as needed. ) 30 g 1    triamcinolone acetonide 0.1% (KENALOG) 0.1 % cream Apply topically 2 (two) times daily. (Patient taking differently: Apply topically 2 (two) times daily as needed. ) 45 g 0       No past surgical history on file.    Social History     Social History    Marital status: Single     Spouse name: N/A    Number of children: N/A    Years of education: N/A     Occupational History    Not on file.     Social History Main Topics    Smoking status: Never Smoker    Smokeless tobacco: Not on file    Alcohol use Not on file    Drug use: Unknown    Sexual activity: Not on file     Other Topics Concern    Not on file     Social History Narrative    At home with parents and older sister richard       OBJECTIVE:     Vital Signs Range (Last 24H):         CBC:   No results for input(s): WBC, RBC, HGB, HCT, PLT, MCV, MCH, MCHC in the last 72 hours.    CMP: No results for input(s): NA, K,  CL, CO2, BUN, CREATININE, GLU, MG, PHOS, CALCIUM, ALBUMIN, PROT, ALKPHOS, ALT, AST, BILITOT in the last 72 hours.    INR:  No results for input(s): PT, INR, PROTIME, APTT in the last 72 hours.    Diagnostic Studies: No relevant studies.    EKG: No recent studies available.    2D ECHO:  No results found for this or any previous visit.      ASSESSMENT/PLAN:         Anesthesia Evaluation    I have reviewed the Patient Summary Reports.     I have reviewed the Medications.     Review of Systems  Anesthesia Hx:  No previous Anesthesia  Neg history of prior surgery. Denies Family Hx of Anesthesia complications.    Social:  Non-Smoker, No Alcohol Use    Hematology/Oncology:  Hematology Normal   Oncology Normal     EENT/Dental:   adenotonsillar hypertrophy Otitis Media   Cardiovascular:  Cardiovascular Normal     Pulmonary:   Sleep Apnea    Renal/:  Renal/ Normal     Hepatic/GI:  Hepatic/GI Normal    Neurological:  Neurology Normal    Endocrine:  Endocrine Normal        Physical Exam  General:  Well nourished    Airway/Jaw/Neck:  Airway Findings: Mouth Opening: Normal General Airway Assessment: Pediatric  Jaw/Neck Findings:  Neck ROM: Normal ROM      Dental:  Dental Findings: In tact   Chest/Lungs:  Chest/Lungs Clear    Heart/Vascular:  Heart Findings: Normal Heart murmur: negative       Mental Status:  Mental Status Findings:  Cooperative, Normally Active child         Anesthesia Plan  Type of Anesthesia, risks & benefits discussed:  Anesthesia Type:  epidural  Patient's Preference:   Intra-op Monitoring Plan: standard ASA monitors  Intra-op Monitoring Plan Comments:   Post Op Pain Control Plan: multimodal analgesia, IV/PO Opioids PRN and per primary service following discharge from PACU  Post Op Pain Control Plan Comments:   Induction:   Inhalation  Beta Blocker:  Patient is not currently on a Beta-Blocker (No further documentation required).       Informed Consent: Patient representative understands risks and agrees  is WFL   Strength (Manual Muscle Testing)   Strength (Manual Muscle Testing) Deficits observed during functional mobility   Strength Comments Grossly deconditioned; moves all extremities against gravity   Bed Mobility   Comment, (Bed Mobility) SBA supine>sit   Transfers   Comment, (Transfers) Lisa sit<>stand, use of FWW in stance   Gait/Stairs (Locomotion)   Comment, (Gait/Stairs) CGA, FWW   Balance   Balance Comments IND sitting, SB-CGA static/dynamic stance with FWW   Clinical Impression   Criteria for Skilled Therapeutic Intervention Yes, treatment indicated   PT Diagnosis (PT) Impaired functional mobility   Influenced by the following impairments Generalized weakness/deconditioning   Functional limitations due to impairments Activity tolerance, bed mobility, transfers, gait, balance, stairs, self cares   Clinical Presentation (PT Evaluation Complexity) Stable/Uncomplicated   Clinical Presentation Rationale Clinical judgment   Clinical Decision Making (Complexity) low complexity   Planned Therapy Interventions (PT) balance training;bed mobility training;gait training;home exercise program;patient/family education;stair training;strengthening;transfer training;progressive activity/exercise;home program guidelines   Anticipated Equipment Needs at Discharge (PT)   (tbd)   Risk & Benefits of therapy have been explained evaluation/treatment results reviewed;care plan/treatment goals reviewed;risks/benefits reviewed;current/potential barriers reviewed;participants voiced agreement with care plan;participants included;patient   PT Total Evaluation Time   PT Eval, Low Complexity Minutes (02127) 5   Physical Therapy Goals   PT Frequency 5x/week   PT Predicted Duration/Target Date for Goal Attainment 01/13/23   PT Goals Bed Mobility;Transfers;Gait;Stairs   PT: Bed Mobility Independent   PT: Transfers Modified independent;Sit to/from stand;Bed to/from chair;Assistive device  (FWW)   PT: Gait Modified independent;Assistive  device;Standard walker;Rolling walker;50 feet   PT: Stairs Modified independent;4 stairs;Rail on both sides   PT Discharge Planning   PT Plan Progress gait with wc follow, LE strength, stairs when appropriate   PT Discharge Recommendation (DC Rec) Transitional Care Facility;home with assist;home with home care physical therapy   PT Rationale for DC Rec Pt presents below baseline, limited by generalized weakness and deconditioning. This date recommend TCU to progress towards PLOF prior to returning home. Pending LOS, steady progress with therapies may enable discharge home with caregiver assist and HH PT. Will continue to follow and update rec's as appropriate.   PT Brief overview of current status Ax1 with FWW for transfers   Total Session Time   Total Session Time (sum of timed and untimed services) 5        with Anesthesia plan.  Questions answered. Anesthesia consent signed with patient representative.  ASA Score: 2     Day of Surgery Review of History & Physical:  There are no significant changes.      Anesthesia Plan Notes:   2M SDB for T&A under GETA with preop sedation        Ready For Surgery From Anesthesia Perspective.

## 2022-12-30 NOTE — PLAN OF CARE
Hours of Care:  2300 - 0700    Temp:  [97.5  F (36.4  C)-98.8  F (37.1  C)] 97.5  F (36.4  C)  Pulse:  [] 104  Resp:  [18-20] 18  BP: ()/(67-88) 110/86  SpO2:  [95 %-100 %] 100 %     D: Pricilla Brown is a 47 year old female w/ PMHx significant for NICM (EF 40-45%), paroxysmal afib (on Eliquis), ID-T2DM, HTN, JYOTI, neuropathy, morbid obesity who presented to the ED on 12/25/2022 due to chest discomfort, palpitations, volume overload concerning for heart failure exacerbation and afib w/ RVR. Hospital course complicated by uncontrolled blood sugar.     I: Monitored vitals and assessed pt status.      PRN: Percocet, Chloraseptic lozenge, simethicone  Tele: Atrial fibrillation  O2: Room air during day.  CPAP at HS.  Mobility: A2 with total lift     A: Neuro: A/O x4.  Call light appropriate.  Able to make needs known.  Respiratory:  On room air.  Denies shortness of breath at rest.  CPAP applied at HS.  Pt has been compliant with CPAP overnight.  Cardiac: VSS.  Atrial fibrillation.  Receives apixaban BID.  No s/s of bleeding.  Lymphedema wraps to bilateral lower extremities.    GI: Last BM 12/30.  Soft, incontinent.  No report of nausea or vomiting.  : Keenan catheter in place.  325 mL pale yellow output this shift  Endo:  Blood sugars Q4H.  Last   Skin:  See PCS for assessment and treatment of wounds and surgical incisions.  LDA:  Midline LUE, 20 G PIV RFA  Electrolytes: RN managed K, Mg, P.  Lab pending.  Pain: Reports 8/10 pain in low back.  Does not appear to be in any distress.  PRN percocet administered per pt request.  Low back massaged.  Isolation:  Standard Precautions  Tests/procedures: None performed overnight.  Diet: 2 g Na.  2000 mL fluid restriction     P: Continue to monitor Pt status and report changes to Cards 2.  Plan to discharge TCU vs. Home with PT/OT when medically appropriate.

## 2022-12-30 NOTE — PROGRESS NOTES
[facility]   Cardiology II Service / Advanced Heart Failure  Daily Progress Note    Date of Service: 12/26/2022  Patient: Pricilla Brown  MRN: [id number]  Admission Date: 12/25/2022  Hospital Day # 5    Assessment and Plan:  Pricilla Brown is a 47 year old female w/ PMHx significant for NICM (EF 40-45%), paroxysmal afib (on Eliquis), ID-T2DM, HTN, JYOTI, neuropathy, morbid obesity who presented to the ED on 12/25/2022 due to chest discomfort, palpitations, volume overload concerning for heart failure exacerbation and afib w/ RVR. Hospital course complicated by uncontrolled blood sugar.    Changes Today:  - Continue holding Bumex in setting of NIRAJ  - Repeat BNP  - Insulin regimen adjusted per endocrinology  - Likely medically stable for discharge on 12/31  - PT currently recommends TCU; will touch base with care manager  - [facility] accepted patient for but will not be able to offer services until 1/4 or 1/5    Acute Issues:  Acute on chronic HFrEF (EF 40-45%) 2/2 NICM (Stage C, NYHA Class IIII)  Patient presents with sxs of volume overload, including SOB, MURRAY, PND, orthopnea, increased abdominal girth, 3+ BLE pitting edema. NTproBNP 2788. CXR w/ pulmonary edema. Suspect 2/2 dietary and medication noncompliance vs. afib w/ RVR. Unclear dry weight, although weight at discharge from prior hospital admission (10/1/2022) was 462 lbs. Admission weight 515 lbs.  - Diuresis: hold in setting of NIRAJ; approaching euvolemia  - GDMT              - ACEi/ARB/ARNi: continue PTA hydralazine 12.5mg TID, Isordil 20mg TID               - Aldosterone Inhibitor: continue PTA spironolactone 50mg daily              - Beta blocker: continue PTA metoprolol succinate 50mg daily               - SGLT2i: continue PTA empagliflozin 25mg daily  - SCD: none  - Telemetry  - Replete K > 4, Mg > 2 (RN repletion protocol ordered)  - Strict I/O  - Daily weights  - Na (2g) and fluid (< 1.5L/d) restricted diet  - Lower  extremity wraps  - CORE follow up scheduled on 1/11    NIRAJ on CKD III, worsening  Cr 1.62 on admission. Baseline Cr approximately 1.3-1.4. Uptrend in Cr since 12/27, in the setting of excellent urine output, suggests NIRAJ from overdiuresis.  - Hold diuresis as above  - Trend Cr  - Avoid nephrotoxins    Atrial fibrillation w/ RVR, HR controlled  [id number] 4. Patient has not been taking PTA metoprolol for a few days.  - Continue PTA apixaban 5mg BID  - Continue PTA metoprolol succinate 50mg daily  - If sustained HR > 130s, will start amiodarone gtt    Insulin-dependent T2DM, uncontrolled c/b neuropathy  A1c 13.5%. Patient does not take insulin consistently at home. Unclear if insulin doses listed in home med list are accurate; started with lower doses similar to those at last hospitalization. Patient confirms that she takes her PTA insulin as prescribed, although later admitted that she usually eats significantly more at home. Blood sugar has been labile throughout admission. Initially developped hypoglycemia necessitating D10 gtt.  - Endocrinology consulted; recs appreciated   - Lantus reduced to 38U daily, meal time correcting insulin based on carb count, custom sliding scale insulin  - Hold PTA insulin regimen: glargine (Toujeo) 225U in AM; aspart 100U TID  - Hold PTA semaglutide     Chronic/Resolved Issues:  Elevated troponin 2/2 AoC HF and afib w/ RVR, peaked  Troponin 72 > 74 > 67. Low suspicion for ACS given resolution of chest pain and no ischemic changes seen on EKG. Chest pain lasts for seconds then dissipates and occurs with palpitations, likely representing discomfort from significant volume overload.    Hypothyroidism, uncontrolled  TSH 14.3 and fT4 0.53 on admission. Patient states that she has not taken PTA levothyroxine in > 1 week.  - Continue PTA levothyroxine 200mcg daily (400mcg on Sundays)     HTN  - Hold PTA hydrochlorothiazide 12.5mg BID     COPD  - Continue PTA inhalers     JYOTI  - CPAP  ordered     Diet: Cardiac and carb control, 1.5L fluid restriction  DVT ppx: Eliquis  Code Status: Full (confirmed with patient on admission)  Discharge plan: Medically stable for discharge on 12/31; TCU unlikely given weight; home care accepted patient with services to start on 1/4     Patient was discussed with attending physician, Dr. Balderrama.    Madeline Vasquez MD  Internal Medicine, PGY-1    ================================================================    Interval History:   No acute events overnight. Nursing notes reviewed. Patient notes improved abdominal fullness, lower extremity tightness, SOB. Denies chest pain, palpitations, lightheadedness, syncope.    Medications: Reviewed.     Physical Exam:   Temp:  [97.5  F (36.4  C)-98.8  F (37.1  C)] 97.9  F (36.6  C)  Pulse:  [] 98  Resp:  [17-20] 17  BP: ()/(67-86) 104/85  SpO2:  [95 %-100 %] 98 %  GENERAL: No acute distress.  HEENT: EOMI.  NECK: Supple. JVP not visualized.  CV: S1/S2 heard without murmur. Distant heart sounds.  RESPIRATORY: Basilar crackles. No wheezing.  GI: Soft and non-distended with normoactive bowel sounds present in all quadrants. No tenderness, rebound, guarding.  EXTREMITIES: 2+ BLE edema. Wraps in place.  NEUROLOGIC: Alert and orientated x 3. CN II-XII grossly intact. No focal deficits.  SKIN: No jaundice. No acute rashes or lesions.    Vitals:    12/28/22 0700 12/29/22 0600 12/30/22 0422   Weight: (!) 209 kg (460 lb 12.2 oz) (!) 204 kg (449 lb 11.8 oz) (!) 206 kg (454 lb 2.4 oz)        I/O last 3 completed shifts:  In: 1498 [P.O.:1498]  Out: 2925 [Urine:2925]    Peripheral IV 12/27/22 Anterior;Right Lower forearm (Active)   Site Assessment WDL 12/30/22 0830   Line Status Saline locked 12/30/22 0830   Dressing Intervention New dressing  12/27/22 0616   Phlebitis Scale 0-->no symptoms 12/30/22 0830   Infiltration Scale 0 12/30/22 0830   Number of days: 3       Midline Catheter Double Lumen Left Basilic (Active)   Site  Assessment WDL 12/30/22 0830   External Cath Length (cm) 2 cm 12/28/22 1036   Dressing Chlorhexidine disk;Transparent 12/30/22 0830   Dressing Status clean;dry;intact 12/30/22 0830   Dressing Intervention dressing changed 12/28/22 1036   Dressing Change Due 01/04/23 12/29/22 0900   Line Necessity Yes, meets criteria 12/30/22 0830   Purple - Status saline locked 12/30/22 0830   Purple - Cap Change Due 01/03/23 12/30/22 0830   Purple - Intervention Cap change 12/30/22 0830   Red - Status saline locked 12/30/22 0830   Red - Cap Change Due 01/03/23 12/30/22 0830   Red - Intervention Cap change 12/30/22 0830   Phlebitis Scale 0-->no symptoms 12/30/22 0000   Infiltration? no 12/30/22 0000   Number of days: 3       Urethral Catheter 12/27/22 (Active)   Tube Description Positional 12/30/22 0830   Catheter Care Catheter wipes 12/30/22 0830   Collection Container Standard 12/30/22 0830   Securement Method Securing device (Describe) 12/30/22 0830   Rationale for Continued Use Strict 1-2 Hour I&O 12/30/22 0830   Urine Output 325 mL 12/30/22 0700   Number of days: 3     Labs:  ABG:  Lab Results   Component Value Date    PH 7.39 12/25/2022          Lab Results   Component Value Date     12/26/2022     12/25/2022     10/31/2022     06/03/2021     08/19/2020     10/29/2019    Lab Results   Component Value Date    CHLORIDE 95 12/26/2022    CHLORIDE 94 12/25/2022    CHLORIDE 98 10/31/2022    CHLORIDE 103 06/15/2022    CHLORIDE 101 10/20/2021    CHLORIDE 98 09/09/2021    CHLORIDE 98 06/03/2021    CHLORIDE 95 08/19/2020    CHLORIDE 102 10/29/2019    Lab Results   Component Value Date    BUN 64.2 12/26/2022    BUN 70.5 12/25/2022    BUN 44.8 10/31/2022    BUN 17 06/15/2022    BUN 14 10/20/2021    BUN 18 09/09/2021    BUN 13 06/03/2021    BUN 14 08/19/2020    BUN 14 10/29/2019      Lab Results   Component Value Date    POTASSIUM 4.0 12/26/2022    POTASSIUM 4.1 12/25/2022    POTASSIUM 3.5 10/31/2022     POTASSIUM 3.5 06/15/2022    POTASSIUM 4.3 10/20/2021    POTASSIUM 3.7 09/09/2021    POTASSIUM 4.2 06/03/2021    POTASSIUM 4.4 08/19/2020    POTASSIUM 3.6 10/29/2019    Lab Results   Component Value Date    CO2 29 12/26/2022    CO2 30 12/25/2022    CO2 31 10/31/2022    CO2 29 06/15/2022    CO2 30 10/20/2021    CO2 28 09/09/2021    CO2 25 06/03/2021    CO2 26 08/19/2020    CO2 30 10/29/2019    Lab Results   Component Value Date    CR 1.47 12/26/2022    CR 1.62 12/25/2022    CR 1.58 10/31/2022    CR 0.89 06/03/2021    CR 1.00 08/19/2020    CR 1.25 10/29/2019        Lab Results   Component Value Date    WBC 4.3 12/30/2022    WBC 6.9 12/29/2022    WBC 9.1 12/28/2022    HGB 15.0 12/30/2022    HGB 15.0 12/29/2022    HGB 16.2 (H) 12/28/2022    HCT 50.2 (H) 12/30/2022    HCT 50.3 (H) 12/29/2022    HCT 53.2 (H) 12/28/2022    MCV 98 12/30/2022    MCV 97 12/29/2022    MCV 96 12/28/2022     (L) 12/30/2022     (L) 12/29/2022     12/28/2022     Lab Results   Component Value Date    AST 36 (H) 12/30/2022    AST 43 (H) 12/29/2022    AST 47 (H) 12/28/2022    ALT 34 12/30/2022    ALT 39 (H) 12/29/2022    ALT 36 (H) 12/28/2022    ALKPHOS 162 (H) 12/30/2022    ALKPHOS 161 (H) 12/29/2022    ALKPHOS 151 (H) 12/28/2022    BILITOTAL 0.8 12/30/2022    BILITOTAL 0.6 12/29/2022    BILITOTAL 0.7 12/28/2022    BILICONJ 0.0 10/17/2013     Lab Results   Component Value Date    INR 1.36 (H) 12/25/2022    INR 0.99 08/19/2020    INR 1.94 (H) 10/29/2019

## 2022-12-31 NOTE — PROGRESS NOTES
Diabetes Consult Daily  Progress Note          Assessment/Plan:     HPI:  Pricilla is a 47 year old female with a PMHx non-ischemic CM, paroxysmal atrial fibrillation, insulin dependent type 2 diabetes, HTN, JYOTI, morbid obesity who was admitted on 12/25/2022 for acute heart failure exacerbation, and atrial fibrillation with RVR.      Assessment:     1) Type II diabetes un controlled (A1c 13.5%) with insulin resistance.  2) Hypoglycemia- multifactorial- related to aggressive insulin titration, NIRAJ on CKD this admission, and fluid shifts resulting in likely improved insulin absorption.  3) Morbid obesity; BMI 74  4) LE edema         Plan:                  - Lantus give 10 unit(s) this AM (0800) once      - Lantus 30 unit(s) this PM at (2000)                 - adjust Novolog meal insulin 1:10g cho AC meals/snacks - further increase to 1:8 g cho to start at dinner                 - Novolog 2/30  sliding scale TID AC and HS      - PTA Jardiance: held by primary team; cont hold Trulicity.                  - BG monitoring TID AC/HS                 - hypoglycemia protocol                 - carb counting protocol                 - diabetes education needs will be assessed closer to discharge.       - *recommend Podiatry consult for diabetes foot care - if available while in-patient.     Tentative recommendations for Discharge:     Medications and supplies are to be ordered by primary service on discharge.   If there are problems or issues with ordering for discharge, you may contact the pharmacist directly for assistance   *please use the DIAB non-branded discharge supply order set ([id number])*    Because of Pricilla's severe hypoglycemia - primary team, please order emergency Glucagon injectable.      DO NOT TAKE THE DOSES OF INSULIN YOU WERE TAKING BEFORE COMING IN THE HOSPITAL   TO BE CLEAR - DO NOT TAKE 225 UNITS OF TUOJEO AND DO NOT TAKE 100 UNITS OF NOVOLOG WITH YOUR MEALS      -OK to re-start  "Trulicity once home    -blood glucose monitoring three times daily before meals and at bedtime  or resume your CGM  -Glargine (Lantus, or Basaglar, per insurance coverage) ** units daily in the **  -Aspart (Novolog or Humalog, per insurance coverage): ** units TID with meals, 1:** g CHO with meals and snacks  -Aspart (Novolog or Humalog, per insurance coverage): custom 2/30 intensity sliding scale (see below)    ISF 15   2 per 30 >/= 140  -169 give 2 units.  -199 give 4 units.  -229 give 6 units.  -259 give 8 units.  -289 give 10 units.  -319 give 12 units.  -349 give 14 units.  BG >/= 350 give 16 units.    -229 give 2 units.  -259 give 4 units.  -289 give 6 units.  -319 give 8 units.  -349 give 10 units.  BG >/= 350 give 12 units.       -follow up with [facility] in 1-2 weeks after discharge (appointment request sent to clinic coordinator on 12/31/2022)   -upon discharge, patient will need the following supplies: Lantus Solostar pens, Novolog Flexpens, \"BD\" (32G x 4mm) insulin pen needles, glucometer, lancets, and test strips (if brand of meter not known, can be ordered \"no brand specified\" and note to pharmacy: \"can substitute per insurance coverage\"), sharps container, alcohol swabs.     Plan discussed with patient, bedside RN/primary team via this note.         Interval History:     The last 24 hours progress and nursing notes reviewed.      BG trend: > target, BG proving quite labile with the unpredictably with fluids shifts/likely prolonged insulin clearance 2/2 renal status.              This AM will add a one time dose of lantus and increase cho coverage conservatively given yesterday's precipitous drops and prolonged hypoglycemia trends.      Returned page to provider re:potential discharge today - no answer at the provided number.  BG remain unpredictable and labile.     BG's ->  [phone] over past 12 " hours    Discussed dispo planning with primary team,Catalina from a diab stand-point, she is not ready and writer unable to provide safe/reliable recs today.    Would like at least 24 more hours without severe highs <250 or severe/sustained low lows - goal of BG at least > 100 mg/dL and ideally > 120.     To reiterate rational why discharge is unsafe today - PTA dosing 225 unit(s)   12/25 and 12/26 - 150 unit(s) resulting in severe and prolonged hypoglycemia and NO basal until 12/29 on 45 unit(s) resulted in another low to 64 mg/dL   Subsequently she was started on 20 unit(s) last night then called [employer] and had [place] delivered and did not advise the RN who obtained a post-prandial BG of 331.  Gave 10 unit(s) this AM to conservatively attempt to even out the BG.    2:30 PM  Team again calling for discharge recs. See above.     Today Pricilla is sharing she eats a lot at home  She is encouraged to eat similarly to the way she does at home so diab service is able to more accurately match the insulin plan to her needs    She has candy and popcorn at bedside.  She is aware to call for pre-meal BG and to call for all meals/snacks to be covered.      Planned Procedures/surgeries: none  D5W-containing solutions/medications: sodium phos in D5W    Recent Labs   Lab 12/31/22  0404 12/30/22  2355 12/30/22  2200 12/30/22  1626 12/30/22  1335 12/30/22  1326   * 331* 267* 111* 101* 76         Nutrition:     Orders Placed This Encounter      Combination Diet Moderate Consistent Carb (60 g CHO per Meal) Diet; 2 gm NA Diet; Low Saturated Fat Diet        PTA Regimen:     BG monitor: fingerstick  Frequency of checks: 3 times daily  Tuojeo (glargine U300) 225 units once daily  Novolog 100  Units with meals, plus 1/15 correction scale  Trulicity 1mg weekly on Mondays  Jardiance 25 mg daily AM  (has not tolerated Metformin in the past)          Review of Systems:   CC: see interval hx  No focal complaints - eating well         Medications:   Steroid planning:  no  Tube Feeding: no       Physical Exam:   BP 97/76 (BP Location: Left arm)   Pulse 109   Temp 97.8  F (36.6  C) (Oral)   Resp 22   Wt (!) 206 kg (454 lb 2.4 oz)   SpO2 99%   BMI 73.30 kg/m      General:   A&O, NAD, pleasant, resting comfortably. Well nourished - sitting up in chair  HEENT:  NC/AT. MMM, EOMI, Anicteric. Hearing WNL.   Lungs:  unremarkable, no new cough, no SOB  ABD:   Rounded, obese   Extremities:  Moving all extremities, ++ edema - lymphedema wraps - long/thickened toenails  Skin:  warm and dry, no obvious lesions/rash/bleeding  Neuro:  No focal neurological deficits  Psych:   Cooperative, appropriate mood, good eye contact, congruent affect          Data:     Lab Results   Component Value Date    A1C 13.5 12/25/2022    A1C >15.0 07/23/2021    A1C >14.0 08/19/2020    A1C 8.4 10/11/2018    A1C 9.6 05/29/2018    A1C 9.0 04/07/2017    A1C 12.1 03/10/2016        CBC RESULTS: Recent Labs   Lab Test 12/31/22  0404   WBC 6.8   RBC 5.11   HGB 15.1   HCT 48.3*   MCV 95   MCH 29.5   MCHC 31.3*   RDW 17.0*   *     Recent Labs   Lab Test 12/31/22  1136 12/31/22  0728 12/31/22  0404 12/30/22  1335 12/30/22  1326   NA  --   --  137  --  134*   POTASSIUM  --   --  4.4  --  4.7   CHLORIDE  --   --  91*  --  89*   CO2  --   --  34*  --  36*   ANIONGAP  --   --  12  --  9   * 266* 259*   < > 76   BUN  --   --  56.1*  --  58.5*   CR  --   --  1.61*  --  1.85*   JUSTIN  --   --  9.0  --  8.7    < > = values in this interval not displayed.     Liver Function Studies -   Recent Labs   Lab Test 12/28/22  0608   PROTTOTAL 6.6   ALBUMIN 2.6*   BILITOTAL 0.7   ALKPHOS 151*   AST 47*   ALT 36*     Lab Results   Component Value Date    INR 1.36 12/25/2022    INR 0.99 08/19/2020    INR 1.94 10/29/2019    INR 3.49 07/23/2019    INR 2.29 06/26/2019    INR 3.02 03/18/2019    INR 2.42 03/13/2019    INR 1.21 02/19/2019    INR 3.70 01/07/2019    INR 3.24 11/30/2018    INR 2.13  10/30/2018    INR 2.99 10/24/2018    INR 2.18 10/11/2018     YOCASTA Schwarz CNP   Inpatient Diabetes Management Service  Pager - [phone]  Available on [employer]     To contact Endocrine Diabetes service:   From 8AM-4PM: page inpatient diabetes provider who is following the patient that day (see filed or incomplete progress notes/consult notes).  If uncertain of provider assignment: page job code [phone]. (To page job code in-house dial 3 stars, [phone] then enter number).  For questions or updates AFTER HOURS from 4PM-8AM: page the diabetes job code for on call fellow: [phone]    Please notify inpatient diabetes service if changes are planned to steroids, nutrition, or if procedures are planned requiring prolonged NPO status.Diabetes Management Team job code: [phone]    I spent a total of 35 minutes on the date of the encounter doing chart review, history and exam, documentation and further activities per the note.  Over 50% of my time on the unit was spent counseling the patient and/or coordinating care regarding acute hyperglycemic management.  See note for details.

## 2022-12-31 NOTE — PROGRESS NOTES
[facility]   Cardiology II Service / Advanced Heart Failure  Daily Progress Note    Date of Service: 12/26/2022  Patient: Pricilla Brown  MRN: [id number]  Admission Date: 12/25/2022  Hospital Day # 6    Assessment and Plan:  Pricilla Brown is a 47 year old female w/ PMHx significant for NICM (EF 40-45%), paroxysmal afib (on Eliquis), ID-T2DM, HTN, JYOTI, neuropathy, morbid obesity who presented to the ED on 12/25/2022 due to chest discomfort, palpitations, volume overload concerning for heart failure exacerbation and afib w/ RVR. Hospital course complicated by uncontrolled blood sugar.    Changes Today:  - Resume PTA Bumex 5mg BID, spironolactone, empagliflozin  - Increase metoprolol succinate to 75mg daily  - Continue inpatient given labile blood sugars; appreciate endocrine assistance  - Remove Keenan  - Continue PT/OT  - Hopeful discharge on 1/1 pending plan for outpatient insulin regimen    Acute Issues:  Acute on chronic HFrEF (EF 40-45%) 2/2 NICM (Stage C, NYHA Class IIII)  Patient presents with sxs of volume overload, including SOB, MURRAY, PND, orthopnea, increased abdominal girth, 3+ BLE pitting edema. NTproBNP 2788. CXR w/ pulmonary edema. Suspect 2/2 dietary and medication noncompliance vs. afib w/ RVR. Unclear dry weight, although weight at discharge from prior hospital admission (10/1/2022) was 462 lbs. Admission weight 515 lbs.  - Diuresis: continue PTA Bumex 5mg BID  - GDMT              - ACEi/ARB/ARNi: continue PTA hydralazine 12.5mg TID, Isordil 20mg TID               - Aldosterone Inhibitor: continue PTA spironolactone 50mg daily              - Beta blocker: metoprolol succinate 75mg daily (increased on 12/31)              - SGLT2i: continue PTA empagliflozin 25mg daily  - SCD: none  - Telemetry  - Replete K > 4, Mg > 2 (RN repletion protocol ordered)  - Strict I/O  - Daily weights  - Na (2g) and fluid (< 1.5L/d) restricted diet  - Lower extremity wraps  - CORE follow up scheduled  on 1/11    NIRAJ on CKD III, improving  Cr 1.62 on admission. Baseline Cr approximately 1.3-1.4. Uptrend in Cr since 12/27, in the setting of excellent urine output, suggests NIRAJ from overdiuresis.  - Trend Cr  - Avoid nephrotoxins    Atrial fibrillation w/ RVR, HR controlled  [id number] 4. Patient has not been taking PTA metoprolol for a few days.  - Continue PTA apixaban 5mg BID  - Increased metoprolol succinate to 75mg daily  - If sustained HR > 130s, will start amiodarone gtt    Insulin-dependent T2DM, uncontrolled c/b neuropathy  A1c 13.5%. Patient does not take insulin consistently at home. Unclear if insulin doses listed in home med list are accurate; started with lower doses similar to those at last hospitalization. Patient confirms that she takes her PTA insulin as prescribed, although later admitted that she usually eats significantly more at home. Blood sugar has been labile throughout admission. Initially developped hypoglycemia necessitating D10 gtt.  - Endocrinology consulted; recs appreciated   - Lantus reduced to 10U in AM, meal time correcting insulin based on carb count, custom sliding scale insulin  - Hold PTA insulin regimen: glargine (Toujeo) 225U in AM; aspart 100U TID  - Hold PTA semaglutide     Chronic/Resolved Issues:  Elevated troponin 2/2 AoC HF and afib w/ RVR, peaked  Troponin 72 > 74 > 67. Low suspicion for ACS given resolution of chest pain and no ischemic changes seen on EKG. Chest pain lasts for seconds then dissipates and occurs with palpitations, likely representing discomfort from significant volume overload.    Hypothyroidism, uncontrolled  TSH 14.3 and fT4 0.53 on admission. Patient states that she has not taken PTA levothyroxine in > 1 week.  - Continue PTA levothyroxine 200mcg daily (400mcg on Sundays)  - Monitor with PCP     HTN  - Hold PTA hydrochlorothiazide 12.5mg BID     COPD  - Continue PTA inhalers     YJOTI  - CPAP ordered       Diet: Cardiac and carb control, 1.5L fluid  restriction  DVT ppx: Eliquis  Code Status: Full (confirmed with patient on admission)  Discharge plan: Medically stable for discharge on 1/1 pending outpatient insulin regimen; home care accepted patient with services to start on 1/4 (patient agreeable to this)     Patient was discussed with attending physician, Dr. Balderrama.    Madeline Vasquez MD  Internal Medicine, PGY-1    ================================================================    Interval History:   No acute events overnight. Nursing notes reviewed. Denies chest pain, palpitations, lightheadedness, syncope. Persistent lower extremity edema, but improved compared to prior.    NIRAJ improving. Blood sugar continues to be labile on significantly reduced insulin regimen.    Medications: Reviewed.     Physical Exam:   Temp:  [97.3  F (36.3  C)-97.9  F (36.6  C)] 97.9  F (36.6  C)  Pulse:  [] 112  Resp:  [16-22] 20  BP: ()/(65-99) 98/66  SpO2:  [91 %-100 %] 100 %  GENERAL: No acute distress.  HEENT: EOMI.  NECK: Supple. JVP not visualized.  CV: S1/S2 heard without murmur. Distant heart sounds.  RESPIRATORY: Diminished at bases. No wheezing.  GI: Soft and non-distended with normoactive bowel sounds present in all quadrants. No tenderness, rebound, guarding.  EXTREMITIES: 2+ BLE edema. Wraps in place.  NEUROLOGIC: Alert and orientated x 3. CN II-XII grossly intact. No focal deficits.  SKIN: No jaundice. No acute rashes or lesions.    Vitals:    12/29/22 0600 12/30/22 0422 12/31/22 0950   Weight: (!) 204 kg (449 lb 11.8 oz) (!) 206 kg (454 lb 2.4 oz) (!) 214.9 kg (473 lb 12.8 oz)        I/O last 3 completed shifts:  In: 2110 [P.O.:2100; I.V.:10]  Out: 3835 [Urine:3835]    Peripheral IV 12/27/22 Anterior;Right Lower forearm (Active)   Site Assessment WDL 12/31/22 0714   Line Status Saline locked 12/31/22 0714   Dressing Intervention New dressing  12/27/22 0616   Phlebitis Scale 0-->no symptoms 12/31/22 0714   Infiltration Scale 0 12/31/22 0714    Infiltration Site Treatment Method  None 12/31/22 0000   Number of days: 4       Midline Catheter Double Lumen Left Basilic (Active)   Site Assessment WDL 12/31/22 0714   External Cath Length (cm) 2 cm 12/28/22 1036   Dressing Chlorhexidine disk;Transparent 12/31/22 0000   Dressing Status clean;dry;intact 12/31/22 0714   Dressing Intervention dressing changed 12/28/22 1036   Dressing Change Due 01/01/23 12/31/22 0714   Line Necessity Yes, meets criteria 12/31/22 0714   Purple - Status saline locked 12/31/22 0714   Purple - Cap Change Due 01/03/23 12/31/22 0714   Purple - Intervention Cap change 12/30/22 0830   Red - Status saline locked 12/31/22 0714   Red - Cap Change Due 01/03/23 12/31/22 0714   Red - Intervention Cap change 12/30/22 0830   Phlebitis Scale 0-->no symptoms 12/31/22 0000   Infiltration? no 12/31/22 0714   Number of days: 4       Urethral Catheter 12/27/22 (Active)   Tube Description Positional 12/31/22 0750   Catheter Care Catheter wipes 12/30/22 0830   Collection Container Standard 12/31/22 0750   Securement Method Securing device (Describe) 12/31/22 0750   Rationale for Continued Use Other (Comment) 12/31/22 0000   Urine Output 475 mL 12/31/22 1100   Number of days: 4     Labs:  ABG:  Lab Results   Component Value Date    PH 7.39 12/25/2022          Lab Results   Component Value Date     12/26/2022     12/25/2022     10/31/2022     06/03/2021     08/19/2020     10/29/2019    Lab Results   Component Value Date    CHLORIDE 95 12/26/2022    CHLORIDE 94 12/25/2022    CHLORIDE 98 10/31/2022    CHLORIDE 103 06/15/2022    CHLORIDE 101 10/20/2021    CHLORIDE 98 09/09/2021    CHLORIDE 98 06/03/2021    CHLORIDE 95 08/19/2020    CHLORIDE 102 10/29/2019    Lab Results   Component Value Date    BUN 64.2 12/26/2022    BUN 70.5 12/25/2022    BUN 44.8 10/31/2022    BUN 17 06/15/2022    BUN 14 10/20/2021    BUN 18 09/09/2021    BUN 13 06/03/2021    BUN 14 08/19/2020    BUN 14  10/29/2019      Lab Results   Component Value Date    POTASSIUM 4.0 12/26/2022    POTASSIUM 4.1 12/25/2022    POTASSIUM 3.5 10/31/2022    POTASSIUM 3.5 06/15/2022    POTASSIUM 4.3 10/20/2021    POTASSIUM 3.7 09/09/2021    POTASSIUM 4.2 06/03/2021    POTASSIUM 4.4 08/19/2020    POTASSIUM 3.6 10/29/2019    Lab Results   Component Value Date    CO2 29 12/26/2022    CO2 30 12/25/2022    CO2 31 10/31/2022    CO2 29 06/15/2022    CO2 30 10/20/2021    CO2 28 09/09/2021    CO2 25 06/03/2021    CO2 26 08/19/2020    CO2 30 10/29/2019    Lab Results   Component Value Date    CR 1.47 12/26/2022    CR 1.62 12/25/2022    CR 1.58 10/31/2022    CR 0.89 06/03/2021    CR 1.00 08/19/2020    CR 1.25 10/29/2019        Lab Results   Component Value Date    WBC 6.8 12/31/2022    WBC 4.3 12/30/2022    WBC 6.9 12/29/2022    HGB 15.1 12/31/2022    HGB 15.0 12/30/2022    HGB 15.0 12/29/2022    HCT 48.3 (H) 12/31/2022    HCT 50.2 (H) 12/30/2022    HCT 50.3 (H) 12/29/2022    MCV 95 12/31/2022    MCV 98 12/30/2022    MCV 97 12/29/2022     (L) 12/31/2022     (L) 12/30/2022     (L) 12/29/2022     Lab Results   Component Value Date    AST 43 (H) 12/31/2022    AST 36 (H) 12/30/2022    AST 43 (H) 12/29/2022    ALT 38 (H) 12/31/2022    ALT 34 12/30/2022    ALT 39 (H) 12/29/2022    ALKPHOS 176 (H) 12/31/2022    ALKPHOS 162 (H) 12/30/2022    ALKPHOS 161 (H) 12/29/2022    BILITOTAL 0.6 12/31/2022    BILITOTAL 0.8 12/30/2022    BILITOTAL 0.6 12/29/2022    BILICONJ 0.0 10/17/2013     Lab Results   Component Value Date    INR 1.36 (H) 12/25/2022    INR 0.99 08/19/2020    INR 1.94 (H) 10/29/2019

## 2022-12-31 NOTE — PROGRESS NOTES
ICU End of Shift Summary. See flowsheets for vital signs and detailed assessment.    Changes this shift: Patient remains without any acute events overnight. A&Ox4; makes needs known and uses the call light; complains of pain in BLE (non-pharm alternatives tried first, PRN flexeril and percocet given). Afebrile; remains in AFib 90's-120's; normotensive. RA while awake and transitioned to BiPAP for sleeping. Last BM 12/30. 1500 mL fluid restriction. Keenan in place with large amounts of UO. Mag and Phos replaced per AM labs.    Plan: Re-evaluate the need for Bumex. Work with PT/OT. Encourage activity and self-care. Continue to monitor and follow POC. Notify MDs of any acute changes.    Goal Outcome Evaluation:    Plan of Care Reviewed With: patient    Overall Patient Progress: declining    Outcome Evaluation: Maintain blood glucose levels within target range. Patient educated on the importance of normoglycemia related to her medical issues.

## 2023-01-01 ENCOUNTER — APPOINTMENT (OUTPATIENT)
Dept: PHYSICAL THERAPY | Facility: CLINIC | Age: 48
DRG: 292 | End: 2023-01-01
Attending: PHYSICAL MEDICINE & REHABILITATION
Payer: MEDICARE

## 2023-01-01 ENCOUNTER — APPOINTMENT (OUTPATIENT)
Dept: CT IMAGING | Facility: CLINIC | Age: 48
DRG: 291 | End: 2023-01-01
Attending: CASE MANAGER/CARE COORDINATOR
Payer: MEDICARE

## 2023-01-01 ENCOUNTER — TELEPHONE (OUTPATIENT)
Dept: EDUCATION SERVICES | Facility: CLINIC | Age: 48
End: 2023-01-01

## 2023-01-01 ENCOUNTER — APPOINTMENT (OUTPATIENT)
Dept: PHYSICAL THERAPY | Facility: CLINIC | Age: 48
DRG: 291 | End: 2023-01-01
Payer: MEDICARE

## 2023-01-01 ENCOUNTER — DOCUMENTATION ONLY (OUTPATIENT)
Dept: OTHER | Age: 48
End: 2023-01-01

## 2023-01-01 ENCOUNTER — APPOINTMENT (OUTPATIENT)
Dept: GENERAL RADIOLOGY | Facility: CLINIC | Age: 48
DRG: 291 | End: 2023-01-01
Payer: MEDICARE

## 2023-01-01 ENCOUNTER — APPOINTMENT (OUTPATIENT)
Dept: GENERAL RADIOLOGY | Facility: CLINIC | Age: 48
DRG: 292 | End: 2023-01-01
Attending: PHYSICIAN ASSISTANT
Payer: MEDICARE

## 2023-01-01 ENCOUNTER — APPOINTMENT (OUTPATIENT)
Dept: GENERAL RADIOLOGY | Facility: CLINIC | Age: 48
DRG: 291 | End: 2023-01-01
Attending: NURSE PRACTITIONER
Payer: MEDICARE

## 2023-01-01 ENCOUNTER — APPOINTMENT (OUTPATIENT)
Dept: OCCUPATIONAL THERAPY | Facility: CLINIC | Age: 48
DRG: 292 | End: 2023-01-01
Attending: PHYSICAL MEDICINE & REHABILITATION
Payer: MEDICARE

## 2023-01-01 ENCOUNTER — APPOINTMENT (OUTPATIENT)
Dept: ULTRASOUND IMAGING | Facility: CLINIC | Age: 48
DRG: 291 | End: 2023-01-01
Payer: MEDICARE

## 2023-01-01 ENCOUNTER — APPOINTMENT (OUTPATIENT)
Dept: ULTRASOUND IMAGING | Facility: CLINIC | Age: 48
DRG: 292 | End: 2023-01-01
Attending: PHYSICIAN ASSISTANT
Payer: MEDICARE

## 2023-01-01 ENCOUNTER — TELEPHONE (OUTPATIENT)
Dept: PHARMACY | Facility: OTHER | Age: 48
End: 2023-01-01

## 2023-01-01 ENCOUNTER — PRE VISIT (OUTPATIENT)
Dept: ORTHOPEDICS | Facility: CLINIC | Age: 48
End: 2023-01-01

## 2023-01-01 ENCOUNTER — TELEPHONE (OUTPATIENT)
Dept: CARDIOLOGY | Facility: CLINIC | Age: 48
End: 2023-01-01
Payer: MEDICARE

## 2023-01-01 ENCOUNTER — APPOINTMENT (OUTPATIENT)
Dept: CARDIOLOGY | Facility: CLINIC | Age: 48
DRG: 291 | End: 2023-01-01
Attending: NURSE PRACTITIONER
Payer: MEDICARE

## 2023-01-01 ENCOUNTER — APPOINTMENT (OUTPATIENT)
Dept: OCCUPATIONAL THERAPY | Facility: CLINIC | Age: 48
DRG: 291 | End: 2023-01-01
Payer: MEDICARE

## 2023-01-01 ENCOUNTER — TELEPHONE (OUTPATIENT)
Dept: ENDOCRINOLOGY | Facility: CLINIC | Age: 48
End: 2023-01-01
Payer: MEDICARE

## 2023-01-01 ENCOUNTER — HOSPITAL ENCOUNTER (INPATIENT)
Facility: CLINIC | Age: 48
LOS: 22 days | Discharge: NURSING, REHAB OR LONG-TERM CARE | DRG: 291 | End: 2023-02-14
Attending: EMERGENCY MEDICINE | Admitting: INTERNAL MEDICINE
Payer: MEDICARE

## 2023-01-01 ENCOUNTER — APPOINTMENT (OUTPATIENT)
Dept: OCCUPATIONAL THERAPY | Facility: CLINIC | Age: 48
DRG: 292 | End: 2023-01-01
Payer: MEDICARE

## 2023-01-01 ENCOUNTER — APPOINTMENT (OUTPATIENT)
Dept: PHYSICAL THERAPY | Facility: CLINIC | Age: 48
DRG: 292 | End: 2023-01-01
Payer: MEDICARE

## 2023-01-01 ENCOUNTER — PATIENT OUTREACH (OUTPATIENT)
Dept: CARDIOLOGY | Facility: CLINIC | Age: 48
End: 2023-01-01

## 2023-01-01 ENCOUNTER — NURSE TRIAGE (OUTPATIENT)
Dept: FAMILY MEDICINE | Facility: CLINIC | Age: 48
End: 2023-01-01
Payer: MEDICARE

## 2023-01-01 ENCOUNTER — APPOINTMENT (OUTPATIENT)
Dept: PHYSICAL THERAPY | Facility: CLINIC | Age: 48
DRG: 291 | End: 2023-01-01
Attending: CASE MANAGER/CARE COORDINATOR
Payer: MEDICARE

## 2023-01-01 ENCOUNTER — APPOINTMENT (OUTPATIENT)
Dept: ULTRASOUND IMAGING | Facility: CLINIC | Age: 48
DRG: 291 | End: 2023-01-01
Attending: NURSE PRACTITIONER
Payer: MEDICARE

## 2023-01-01 ENCOUNTER — HOSPITAL ENCOUNTER (INPATIENT)
Facility: CLINIC | Age: 48
LOS: 15 days | Discharge: HOME HEALTH | DRG: 292 | End: 2023-03-01
Attending: PHYSICAL MEDICINE & REHABILITATION | Admitting: PHYSICAL MEDICINE & REHABILITATION
Payer: MEDICARE

## 2023-01-01 ENCOUNTER — TELEPHONE (OUTPATIENT)
Dept: UROLOGY | Facility: CLINIC | Age: 48
End: 2023-01-01

## 2023-01-01 VITALS
BODY MASS INDEX: 45.99 KG/M2 | DIASTOLIC BLOOD PRESSURE: 66 MMHG | WEIGHT: 293 LBS | HEART RATE: 80 BPM | TEMPERATURE: 98.7 F | HEIGHT: 67 IN | OXYGEN SATURATION: 100 % | RESPIRATION RATE: 20 BRPM | SYSTOLIC BLOOD PRESSURE: 112 MMHG

## 2023-01-01 VITALS
DIASTOLIC BLOOD PRESSURE: 58 MMHG | BODY MASS INDEX: 73.51 KG/M2 | OXYGEN SATURATION: 95 % | SYSTOLIC BLOOD PRESSURE: 117 MMHG | HEART RATE: 94 BPM | TEMPERATURE: 97.7 F | RESPIRATION RATE: 20 BRPM | WEIGHT: 293 LBS

## 2023-01-01 VITALS
DIASTOLIC BLOOD PRESSURE: 77 MMHG | OXYGEN SATURATION: 95 % | BODY MASS INDEX: 76.28 KG/M2 | SYSTOLIC BLOOD PRESSURE: 88 MMHG | WEIGHT: 293 LBS | RESPIRATION RATE: 20 BRPM | TEMPERATURE: 97.4 F | HEART RATE: 120 BPM

## 2023-01-01 DIAGNOSIS — E11.65 TYPE 2 DIABETES MELLITUS WITH HYPERGLYCEMIA, WITH LONG-TERM CURRENT USE OF INSULIN (H): ICD-10-CM

## 2023-01-01 DIAGNOSIS — M79.2 NEUROPATHIC PAIN OF LOWER EXTREMITY, UNSPECIFIED LATERALITY: ICD-10-CM

## 2023-01-01 DIAGNOSIS — I50.21 ACUTE HFREF (HEART FAILURE WITH REDUCED EJECTION FRACTION) (H): Primary | ICD-10-CM

## 2023-01-01 DIAGNOSIS — R79.89 ELEVATED TROPONIN LEVEL: ICD-10-CM

## 2023-01-01 DIAGNOSIS — Z79.4 TYPE 2 DIABETES MELLITUS WITH HYPERGLYCEMIA, WITH LONG-TERM CURRENT USE OF INSULIN (H): ICD-10-CM

## 2023-01-01 DIAGNOSIS — E78.5 DYSLIPIDEMIA: ICD-10-CM

## 2023-01-01 DIAGNOSIS — R79.89 ELEVATED TROPONIN: ICD-10-CM

## 2023-01-01 DIAGNOSIS — E89.0 HYPOTHYROIDISM FOLLOWING RADIOIODINE THERAPY: ICD-10-CM

## 2023-01-01 DIAGNOSIS — I50.32 CHRONIC DIASTOLIC HEART FAILURE (H): ICD-10-CM

## 2023-01-01 DIAGNOSIS — I48.91 ATRIAL FIBRILLATION WITH RAPID VENTRICULAR RESPONSE (H): ICD-10-CM

## 2023-01-01 DIAGNOSIS — I50.33 ACUTE ON CHRONIC HEART FAILURE WITH PRESERVED EJECTION FRACTION (H): Primary | ICD-10-CM

## 2023-01-01 DIAGNOSIS — Z00.00 HEALTHCARE MAINTENANCE: ICD-10-CM

## 2023-01-01 DIAGNOSIS — I42.0 DILATED CARDIOMYOPATHY (H): ICD-10-CM

## 2023-01-01 DIAGNOSIS — E03.9 HYPOTHYROIDISM, UNSPECIFIED TYPE: ICD-10-CM

## 2023-01-01 DIAGNOSIS — J44.9 CHRONIC OBSTRUCTIVE PULMONARY DISEASE, UNSPECIFIED COPD TYPE (H): Primary | ICD-10-CM

## 2023-01-01 DIAGNOSIS — Z72.0 TOBACCO ABUSE: ICD-10-CM

## 2023-01-01 DIAGNOSIS — I48.20 CHRONIC ATRIAL FIBRILLATION (H): ICD-10-CM

## 2023-01-01 DIAGNOSIS — Z20.822 CONTACT WITH AND (SUSPECTED) EXPOSURE TO COVID-19: ICD-10-CM

## 2023-01-01 DIAGNOSIS — I50.23 ACUTE ON CHRONIC HFREF (HEART FAILURE WITH REDUCED EJECTION FRACTION) (H): ICD-10-CM

## 2023-01-01 DIAGNOSIS — E11.9 DIABETES MELLITUS WITHOUT COMPLICATION (H): ICD-10-CM

## 2023-01-01 DIAGNOSIS — L02.221 FURUNCLE OF ABDOMINAL WALL: ICD-10-CM

## 2023-01-01 DIAGNOSIS — I50.9 HYPERVOLEMIA DUE TO CONGESTIVE HEART FAILURE (H): ICD-10-CM

## 2023-01-01 DIAGNOSIS — Z79.01 LONG TERM (CURRENT) USE OF ANTICOAGULANTS: ICD-10-CM

## 2023-01-01 DIAGNOSIS — I50.33 ACUTE ON CHRONIC HEART FAILURE WITH PRESERVED EJECTION FRACTION (H): ICD-10-CM

## 2023-01-01 DIAGNOSIS — I50.23 ACUTE ON CHRONIC SYSTOLIC HEART FAILURE (H): ICD-10-CM

## 2023-01-01 DIAGNOSIS — Z87.891 PERSONAL HISTORY OF TOBACCO USE, PRESENTING HAZARDS TO HEALTH: ICD-10-CM

## 2023-01-01 DIAGNOSIS — R06.02 SHORTNESS OF BREATH: ICD-10-CM

## 2023-01-01 DIAGNOSIS — E87.70 HYPERVOLEMIA DUE TO CONGESTIVE HEART FAILURE (H): ICD-10-CM

## 2023-01-01 DIAGNOSIS — I10 ESSENTIAL HYPERTENSION: ICD-10-CM

## 2023-01-01 LAB
ALBUMIN SERPL BCG-MCNC: 2.7 G/DL (ref 3.5–5.2)
ALBUMIN SERPL BCG-MCNC: 2.7 G/DL (ref 3.5–5.2)
ALBUMIN SERPL BCG-MCNC: 2.9 G/DL (ref 3.5–5.2)
ALBUMIN SERPL BCG-MCNC: 2.9 G/DL (ref 3.5–5.2)
ALBUMIN SERPL BCG-MCNC: 3 G/DL (ref 3.5–5.2)
ALBUMIN SERPL BCG-MCNC: 3.1 G/DL (ref 3.5–5.2)
ALBUMIN UR-MCNC: 20 MG/DL
ALBUMIN UR-MCNC: 20 MG/DL
ALBUMIN UR-MCNC: 30 MG/DL
ALBUMIN UR-MCNC: 300 MG/DL
ALLEN'S TEST: NO
ALP SERPL-CCNC: 103 U/L (ref 35–104)
ALP SERPL-CCNC: 106 U/L (ref 35–104)
ALP SERPL-CCNC: 120 U/L (ref 35–104)
ALP SERPL-CCNC: 122 U/L (ref 35–104)
ALP SERPL-CCNC: 122 U/L (ref 35–104)
ALP SERPL-CCNC: 157 U/L (ref 35–104)
ALP SERPL-CCNC: 97 U/L (ref 35–104)
ALT SERPL W P-5'-P-CCNC: 11 U/L (ref 10–35)
ALT SERPL W P-5'-P-CCNC: 16 U/L (ref 10–35)
ALT SERPL W P-5'-P-CCNC: 17 U/L (ref 10–35)
ALT SERPL W P-5'-P-CCNC: 18 U/L (ref 10–35)
ALT SERPL W P-5'-P-CCNC: 27 U/L (ref 10–35)
ALT SERPL W P-5'-P-CCNC: 6 U/L (ref 10–35)
AMMONIA PLAS-SCNC: 29 UMOL/L (ref 11–51)
ANION GAP SERPL CALCULATED.3IONS-SCNC: 10 MMOL/L (ref 7–15)
ANION GAP SERPL CALCULATED.3IONS-SCNC: 11 MMOL/L (ref 7–15)
ANION GAP SERPL CALCULATED.3IONS-SCNC: 12 MMOL/L (ref 7–15)
ANION GAP SERPL CALCULATED.3IONS-SCNC: 13 MMOL/L (ref 7–15)
ANION GAP SERPL CALCULATED.3IONS-SCNC: 15 MMOL/L (ref 7–15)
ANION GAP SERPL CALCULATED.3IONS-SCNC: 16 MMOL/L (ref 7–15)
ANION GAP SERPL CALCULATED.3IONS-SCNC: 17 MMOL/L (ref 7–15)
ANION GAP SERPL CALCULATED.3IONS-SCNC: 5 MMOL/L (ref 7–15)
ANION GAP SERPL CALCULATED.3IONS-SCNC: 6 MMOL/L (ref 7–15)
ANION GAP SERPL CALCULATED.3IONS-SCNC: 7 MMOL/L (ref 7–15)
ANION GAP SERPL CALCULATED.3IONS-SCNC: 7 MMOL/L (ref 7–15)
ANION GAP SERPL CALCULATED.3IONS-SCNC: 8 MMOL/L (ref 7–15)
ANION GAP SERPL CALCULATED.3IONS-SCNC: 9 MMOL/L (ref 7–15)
ANION GAP SERPL CALCULATED.3IONS-SCNC: NORMAL MMOL/L
APPEARANCE UR: ABNORMAL
APPEARANCE UR: CLEAR
AST SERPL W P-5'-P-CCNC: 17 U/L (ref 10–35)
AST SERPL W P-5'-P-CCNC: 23 U/L (ref 10–35)
AST SERPL W P-5'-P-CCNC: 23 U/L (ref 10–35)
AST SERPL W P-5'-P-CCNC: 24 U/L (ref 10–35)
AST SERPL W P-5'-P-CCNC: 28 U/L (ref 10–35)
AST SERPL W P-5'-P-CCNC: 30 U/L (ref 10–35)
AST SERPL W P-5'-P-CCNC: 37 U/L (ref 10–35)
AST SERPL W P-5'-P-CCNC: ABNORMAL U/L
ATRIAL RATE - MUSE: 111 BPM
ATRIAL RATE - MUSE: 381 BPM
ATRIAL RATE - MUSE: 92 BPM
ATRIAL RATE - MUSE: NORMAL BPM
B-OH-BUTYR SERPL-MCNC: <0.18 MMOL/L
BACTERIA #/AREA URNS HPF: ABNORMAL /HPF
BACTERIA SPT CULT: NORMAL
BACTERIA UR CULT: ABNORMAL
BASE EXCESS BLDA CALC-SCNC: 4 MMOL/L (ref -9–1.8)
BASE EXCESS BLDV CALC-SCNC: 11.2 MMOL/L (ref -7.7–1.9)
BASE EXCESS BLDV CALC-SCNC: 11.5 MMOL/L (ref -7.7–1.9)
BASE EXCESS BLDV CALC-SCNC: 12 MMOL/L (ref -7.7–1.9)
BASE EXCESS BLDV CALC-SCNC: 13.5 MMOL/L (ref -7.7–1.9)
BASE EXCESS BLDV CALC-SCNC: 15 MMOL/L (ref -7.7–1.9)
BASE EXCESS BLDV CALC-SCNC: 15.2 MMOL/L (ref -7.7–1.9)
BASE EXCESS BLDV CALC-SCNC: 15.5 MMOL/L (ref -7.7–1.9)
BASE EXCESS BLDV CALC-SCNC: 16.5 MMOL/L (ref -7.7–1.9)
BASE EXCESS BLDV CALC-SCNC: 5 MMOL/L (ref -7.7–1.9)
BASOPHILS # BLD AUTO: 0 10E3/UL (ref 0–0.2)
BASOPHILS # BLD AUTO: 0.1 10E3/UL (ref 0–0.2)
BASOPHILS NFR BLD AUTO: 0 %
BASOPHILS NFR BLD AUTO: 1 %
BILIRUB DIRECT SERPL-MCNC: 0.26 MG/DL (ref 0–0.3)
BILIRUB DIRECT SERPL-MCNC: 0.64 MG/DL (ref 0–0.3)
BILIRUB DIRECT SERPL-MCNC: ABNORMAL MG/DL
BILIRUB SERPL-MCNC: 0.6 MG/DL
BILIRUB SERPL-MCNC: 0.6 MG/DL
BILIRUB SERPL-MCNC: 0.8 MG/DL
BILIRUB SERPL-MCNC: 1.4 MG/DL
BILIRUB SERPL-MCNC: 1.4 MG/DL
BILIRUB SERPL-MCNC: 1.6 MG/DL
BILIRUB SERPL-MCNC: 1.6 MG/DL
BILIRUB UR QL STRIP: NEGATIVE
BUN SERPL-MCNC: 32.8 MG/DL (ref 6–20)
BUN SERPL-MCNC: 33.5 MG/DL (ref 6–20)
BUN SERPL-MCNC: 35.1 MG/DL (ref 6–20)
BUN SERPL-MCNC: 36.4 MG/DL (ref 6–20)
BUN SERPL-MCNC: 37.8 MG/DL (ref 6–20)
BUN SERPL-MCNC: 40.1 MG/DL (ref 6–20)
BUN SERPL-MCNC: 42.2 MG/DL (ref 6–20)
BUN SERPL-MCNC: 43.3 MG/DL (ref 6–20)
BUN SERPL-MCNC: 44.2 MG/DL (ref 6–20)
BUN SERPL-MCNC: 44.5 MG/DL (ref 6–20)
BUN SERPL-MCNC: 47.2 MG/DL (ref 6–20)
BUN SERPL-MCNC: 47.3 MG/DL (ref 6–20)
BUN SERPL-MCNC: 47.5 MG/DL (ref 6–20)
BUN SERPL-MCNC: 48.2 MG/DL (ref 6–20)
BUN SERPL-MCNC: 48.4 MG/DL (ref 6–20)
BUN SERPL-MCNC: 48.4 MG/DL (ref 6–20)
BUN SERPL-MCNC: 48.6 MG/DL (ref 6–20)
BUN SERPL-MCNC: 49.2 MG/DL (ref 6–20)
BUN SERPL-MCNC: 49.5 MG/DL (ref 6–20)
BUN SERPL-MCNC: 49.9 MG/DL (ref 6–20)
BUN SERPL-MCNC: 50 MG/DL (ref 6–20)
BUN SERPL-MCNC: 50.4 MG/DL (ref 6–20)
BUN SERPL-MCNC: 50.7 MG/DL (ref 6–20)
BUN SERPL-MCNC: 51.8 MG/DL (ref 6–20)
BUN SERPL-MCNC: 51.9 MG/DL (ref 6–20)
BUN SERPL-MCNC: 52.4 MG/DL (ref 6–20)
BUN SERPL-MCNC: 53 MG/DL (ref 6–20)
BUN SERPL-MCNC: 53.3 MG/DL (ref 6–20)
BUN SERPL-MCNC: 54.3 MG/DL (ref 6–20)
BUN SERPL-MCNC: 54.8 MG/DL (ref 6–20)
BUN SERPL-MCNC: 55 MG/DL (ref 6–20)
BUN SERPL-MCNC: 55.7 MG/DL (ref 6–20)
BUN SERPL-MCNC: 55.7 MG/DL (ref 6–20)
BUN SERPL-MCNC: 55.9 MG/DL (ref 6–20)
BUN SERPL-MCNC: 56.1 MG/DL (ref 6–20)
BUN SERPL-MCNC: 56.2 MG/DL (ref 6–20)
BUN SERPL-MCNC: 57 MG/DL (ref 6–20)
BUN SERPL-MCNC: 57.2 MG/DL (ref 6–20)
BUN SERPL-MCNC: 57.2 MG/DL (ref 6–20)
BUN SERPL-MCNC: 58.6 MG/DL (ref 6–20)
BUN SERPL-MCNC: 59.1 MG/DL (ref 6–20)
BUN SERPL-MCNC: 60.1 MG/DL (ref 6–20)
BUN SERPL-MCNC: 60.3 MG/DL (ref 6–20)
BUN SERPL-MCNC: 60.6 MG/DL (ref 6–20)
BUN SERPL-MCNC: 61.7 MG/DL (ref 6–20)
BUN SERPL-MCNC: 63.1 MG/DL (ref 6–20)
BUN SERPL-MCNC: 63.8 MG/DL (ref 6–20)
BUN SERPL-MCNC: 69.2 MG/DL (ref 6–20)
BUN SERPL-MCNC: 70 MG/DL (ref 6–20)
BUN SERPL-MCNC: 78.4 MG/DL (ref 6–20)
BUN SERPL-MCNC: 78.4 MG/DL (ref 6–20)
BUN SERPL-MCNC: 78.9 MG/DL (ref 6–20)
BUN SERPL-MCNC: 79.4 MG/DL (ref 6–20)
BUN SERPL-MCNC: 81.6 MG/DL (ref 6–20)
BUN SERPL-MCNC: NORMAL MG/DL
CALCIUM SERPL-MCNC: 8.2 MG/DL (ref 8.6–10)
CALCIUM SERPL-MCNC: 8.6 MG/DL (ref 8.6–10)
CALCIUM SERPL-MCNC: 8.6 MG/DL (ref 8.6–10)
CALCIUM SERPL-MCNC: 8.7 MG/DL (ref 8.6–10)
CALCIUM SERPL-MCNC: 8.9 MG/DL (ref 8.6–10)
CALCIUM SERPL-MCNC: 9 MG/DL (ref 8.6–10)
CALCIUM SERPL-MCNC: 9.1 MG/DL (ref 8.6–10)
CALCIUM SERPL-MCNC: 9.2 MG/DL (ref 8.6–10)
CALCIUM SERPL-MCNC: 9.3 MG/DL (ref 8.6–10)
CALCIUM SERPL-MCNC: 9.4 MG/DL (ref 8.6–10)
CALCIUM SERPL-MCNC: 9.5 MG/DL (ref 8.6–10)
CALCIUM SERPL-MCNC: 9.6 MG/DL (ref 8.6–10)
CALCIUM SERPL-MCNC: 9.6 MG/DL (ref 8.6–10)
CALCIUM SERPL-MCNC: 9.7 MG/DL (ref 8.6–10)
CALCIUM SERPL-MCNC: 9.8 MG/DL (ref 8.6–10)
CALCIUM SERPL-MCNC: NORMAL MG/DL
CHLORIDE SERPL-SCNC: 100 MMOL/L (ref 98–107)
CHLORIDE SERPL-SCNC: 100 MMOL/L (ref 98–107)
CHLORIDE SERPL-SCNC: 101 MMOL/L (ref 98–107)
CHLORIDE SERPL-SCNC: 102 MMOL/L (ref 98–107)
CHLORIDE SERPL-SCNC: 89 MMOL/L (ref 98–107)
CHLORIDE SERPL-SCNC: 90 MMOL/L (ref 98–107)
CHLORIDE SERPL-SCNC: 90 MMOL/L (ref 98–107)
CHLORIDE SERPL-SCNC: 91 MMOL/L (ref 98–107)
CHLORIDE SERPL-SCNC: 92 MMOL/L (ref 98–107)
CHLORIDE SERPL-SCNC: 93 MMOL/L (ref 98–107)
CHLORIDE SERPL-SCNC: 94 MMOL/L (ref 98–107)
CHLORIDE SERPL-SCNC: 95 MMOL/L (ref 98–107)
CHLORIDE SERPL-SCNC: 96 MMOL/L (ref 98–107)
CHLORIDE SERPL-SCNC: 98 MMOL/L (ref 98–107)
CHLORIDE SERPL-SCNC: 98 MMOL/L (ref 98–107)
CHLORIDE SERPL-SCNC: 99 MMOL/L (ref 98–107)
CHLORIDE SERPL-SCNC: NORMAL MMOL/L
CK SERPL-CCNC: 37 U/L (ref 26–192)
COLOR UR AUTO: ABNORMAL
COLOR UR AUTO: YELLOW
CREAT SERPL-MCNC: 1.11 MG/DL (ref 0.51–0.95)
CREAT SERPL-MCNC: 1.14 MG/DL (ref 0.51–0.95)
CREAT SERPL-MCNC: 1.15 MG/DL (ref 0.51–0.95)
CREAT SERPL-MCNC: 1.16 MG/DL (ref 0.51–0.95)
CREAT SERPL-MCNC: 1.17 MG/DL (ref 0.51–0.95)
CREAT SERPL-MCNC: 1.18 MG/DL (ref 0.51–0.95)
CREAT SERPL-MCNC: 1.21 MG/DL (ref 0.51–0.95)
CREAT SERPL-MCNC: 1.21 MG/DL (ref 0.51–0.95)
CREAT SERPL-MCNC: 1.22 MG/DL (ref 0.51–0.95)
CREAT SERPL-MCNC: 1.23 MG/DL (ref 0.51–0.95)
CREAT SERPL-MCNC: 1.25 MG/DL (ref 0.51–0.95)
CREAT SERPL-MCNC: 1.26 MG/DL (ref 0.51–0.95)
CREAT SERPL-MCNC: 1.26 MG/DL (ref 0.51–0.95)
CREAT SERPL-MCNC: 1.27 MG/DL (ref 0.51–0.95)
CREAT SERPL-MCNC: 1.27 MG/DL (ref 0.51–0.95)
CREAT SERPL-MCNC: 1.28 MG/DL (ref 0.51–0.95)
CREAT SERPL-MCNC: 1.32 MG/DL (ref 0.51–0.95)
CREAT SERPL-MCNC: 1.34 MG/DL (ref 0.51–0.95)
CREAT SERPL-MCNC: 1.36 MG/DL (ref 0.51–0.95)
CREAT SERPL-MCNC: 1.38 MG/DL (ref 0.51–0.95)
CREAT SERPL-MCNC: 1.38 MG/DL (ref 0.51–0.95)
CREAT SERPL-MCNC: 1.41 MG/DL (ref 0.51–0.95)
CREAT SERPL-MCNC: 1.41 MG/DL (ref 0.51–0.95)
CREAT SERPL-MCNC: 1.44 MG/DL (ref 0.51–0.95)
CREAT SERPL-MCNC: 1.45 MG/DL (ref 0.51–0.95)
CREAT SERPL-MCNC: 1.45 MG/DL (ref 0.51–0.95)
CREAT SERPL-MCNC: 1.47 MG/DL (ref 0.51–0.95)
CREAT SERPL-MCNC: 1.48 MG/DL (ref 0.51–0.95)
CREAT SERPL-MCNC: 1.49 MG/DL (ref 0.51–0.95)
CREAT SERPL-MCNC: 1.49 MG/DL (ref 0.51–0.95)
CREAT SERPL-MCNC: 1.5 MG/DL (ref 0.51–0.95)
CREAT SERPL-MCNC: 1.51 MG/DL (ref 0.51–0.95)
CREAT SERPL-MCNC: 1.51 MG/DL (ref 0.51–0.95)
CREAT SERPL-MCNC: 1.52 MG/DL (ref 0.51–0.95)
CREAT SERPL-MCNC: 1.56 MG/DL (ref 0.51–0.95)
CREAT SERPL-MCNC: 1.57 MG/DL (ref 0.51–0.95)
CREAT SERPL-MCNC: 1.57 MG/DL (ref 0.51–0.95)
CREAT SERPL-MCNC: 1.62 MG/DL (ref 0.51–0.95)
CREAT SERPL-MCNC: 1.62 MG/DL (ref 0.51–0.95)
CREAT SERPL-MCNC: 1.66 MG/DL (ref 0.51–0.95)
CREAT SERPL-MCNC: 1.68 MG/DL (ref 0.51–0.95)
CREAT SERPL-MCNC: 1.74 MG/DL (ref 0.51–0.95)
CREAT SERPL-MCNC: 1.75 MG/DL (ref 0.51–0.95)
CREAT SERPL-MCNC: 1.82 MG/DL (ref 0.51–0.95)
CREAT SERPL-MCNC: 1.85 MG/DL (ref 0.51–0.95)
CREAT SERPL-MCNC: 1.96 MG/DL (ref 0.51–0.95)
CREAT SERPL-MCNC: 2.08 MG/DL (ref 0.51–0.95)
CREAT SERPL-MCNC: 2.16 MG/DL (ref 0.51–0.95)
CREAT SERPL-MCNC: NORMAL MG/DL
CREAT UR-MCNC: 57 MG/DL
CRP SERPL-MCNC: 40.7 MG/L
CRP SERPL-MCNC: 63.88 MG/L
CRP SERPL-MCNC: 77.9 MG/L
DEPRECATED HCO3 PLAS-SCNC: 21 MMOL/L (ref 22–29)
DEPRECATED HCO3 PLAS-SCNC: 21 MMOL/L (ref 22–29)
DEPRECATED HCO3 PLAS-SCNC: 23 MMOL/L (ref 22–29)
DEPRECATED HCO3 PLAS-SCNC: 24 MMOL/L (ref 22–29)
DEPRECATED HCO3 PLAS-SCNC: 25 MMOL/L (ref 22–29)
DEPRECATED HCO3 PLAS-SCNC: 25 MMOL/L (ref 22–29)
DEPRECATED HCO3 PLAS-SCNC: 26 MMOL/L (ref 22–29)
DEPRECATED HCO3 PLAS-SCNC: 26 MMOL/L (ref 22–29)
DEPRECATED HCO3 PLAS-SCNC: 27 MMOL/L (ref 22–29)
DEPRECATED HCO3 PLAS-SCNC: 28 MMOL/L (ref 22–29)
DEPRECATED HCO3 PLAS-SCNC: 29 MMOL/L (ref 22–29)
DEPRECATED HCO3 PLAS-SCNC: 30 MMOL/L (ref 22–29)
DEPRECATED HCO3 PLAS-SCNC: 31 MMOL/L (ref 22–29)
DEPRECATED HCO3 PLAS-SCNC: 32 MMOL/L (ref 22–29)
DEPRECATED HCO3 PLAS-SCNC: 33 MMOL/L (ref 22–29)
DEPRECATED HCO3 PLAS-SCNC: 33 MMOL/L (ref 22–29)
DEPRECATED HCO3 PLAS-SCNC: 34 MMOL/L (ref 22–29)
DEPRECATED HCO3 PLAS-SCNC: 35 MMOL/L (ref 22–29)
DEPRECATED HCO3 PLAS-SCNC: 35 MMOL/L (ref 22–29)
DEPRECATED HCO3 PLAS-SCNC: 36 MMOL/L (ref 22–29)
DEPRECATED HCO3 PLAS-SCNC: 37 MMOL/L (ref 22–29)
DEPRECATED HCO3 PLAS-SCNC: 38 MMOL/L (ref 22–29)
DEPRECATED HCO3 PLAS-SCNC: 38 MMOL/L (ref 22–29)
DEPRECATED HCO3 PLAS-SCNC: 40 MMOL/L (ref 22–29)
DEPRECATED HCO3 PLAS-SCNC: 40 MMOL/L (ref 22–29)
DEPRECATED HCO3 PLAS-SCNC: 42 MMOL/L (ref 22–29)
DEPRECATED HCO3 PLAS-SCNC: NORMAL MMOL/L
DIASTOLIC BLOOD PRESSURE - MUSE: NORMAL MMHG
EOSINOPHIL # BLD AUTO: 0 10E3/UL (ref 0–0.7)
EOSINOPHIL # BLD AUTO: 0.1 10E3/UL (ref 0–0.7)
EOSINOPHIL NFR BLD AUTO: 0 %
EOSINOPHIL NFR BLD AUTO: 1 %
EOSINOPHIL NFR BLD AUTO: 1 %
ERYTHROCYTE [DISTWIDTH] IN BLOOD BY AUTOMATED COUNT: 16 % (ref 10–15)
ERYTHROCYTE [DISTWIDTH] IN BLOOD BY AUTOMATED COUNT: 16 % (ref 10–15)
ERYTHROCYTE [DISTWIDTH] IN BLOOD BY AUTOMATED COUNT: 16.1 % (ref 10–15)
ERYTHROCYTE [DISTWIDTH] IN BLOOD BY AUTOMATED COUNT: 16.3 % (ref 10–15)
ERYTHROCYTE [DISTWIDTH] IN BLOOD BY AUTOMATED COUNT: 16.4 % (ref 10–15)
ERYTHROCYTE [DISTWIDTH] IN BLOOD BY AUTOMATED COUNT: 16.5 % (ref 10–15)
ERYTHROCYTE [DISTWIDTH] IN BLOOD BY AUTOMATED COUNT: 16.6 % (ref 10–15)
ERYTHROCYTE [DISTWIDTH] IN BLOOD BY AUTOMATED COUNT: 16.6 % (ref 10–15)
ERYTHROCYTE [DISTWIDTH] IN BLOOD BY AUTOMATED COUNT: 16.8 % (ref 10–15)
ERYTHROCYTE [DISTWIDTH] IN BLOOD BY AUTOMATED COUNT: 16.8 % (ref 10–15)
ERYTHROCYTE [DISTWIDTH] IN BLOOD BY AUTOMATED COUNT: 17 % (ref 10–15)
ERYTHROCYTE [DISTWIDTH] IN BLOOD BY AUTOMATED COUNT: 17.2 % (ref 10–15)
ERYTHROCYTE [DISTWIDTH] IN BLOOD BY AUTOMATED COUNT: 17.6 % (ref 10–15)
ERYTHROCYTE [DISTWIDTH] IN BLOOD BY AUTOMATED COUNT: 17.8 % (ref 10–15)
ERYTHROCYTE [DISTWIDTH] IN BLOOD BY AUTOMATED COUNT: 18.4 % (ref 10–15)
ERYTHROCYTE [DISTWIDTH] IN BLOOD BY AUTOMATED COUNT: 18.4 % (ref 10–15)
ERYTHROCYTE [DISTWIDTH] IN BLOOD BY AUTOMATED COUNT: 18.6 % (ref 10–15)
ERYTHROCYTE [DISTWIDTH] IN BLOOD BY AUTOMATED COUNT: 18.6 % (ref 10–15)
ERYTHROCYTE [DISTWIDTH] IN BLOOD BY AUTOMATED COUNT: 18.8 % (ref 10–15)
ERYTHROCYTE [DISTWIDTH] IN BLOOD BY AUTOMATED COUNT: 18.9 % (ref 10–15)
ERYTHROCYTE [DISTWIDTH] IN BLOOD BY AUTOMATED COUNT: 19.2 % (ref 10–15)
FLUAV RNA SPEC QL NAA+PROBE: NEGATIVE
FLUBV RNA RESP QL NAA+PROBE: NEGATIVE
FRACT EXCRET NA UR+SERPL-RTO: 0.9 %
GFR SERPL CREATININE-BSD FRML MDRD: 27 ML/MIN/1.73M2
GFR SERPL CREATININE-BSD FRML MDRD: 29 ML/MIN/1.73M2
GFR SERPL CREATININE-BSD FRML MDRD: 31 ML/MIN/1.73M2
GFR SERPL CREATININE-BSD FRML MDRD: 33 ML/MIN/1.73M2
GFR SERPL CREATININE-BSD FRML MDRD: 33 ML/MIN/1.73M2
GFR SERPL CREATININE-BSD FRML MDRD: 35 ML/MIN/1.73M2
GFR SERPL CREATININE-BSD FRML MDRD: 36 ML/MIN/1.73M2
GFR SERPL CREATININE-BSD FRML MDRD: 37 ML/MIN/1.73M2
GFR SERPL CREATININE-BSD FRML MDRD: 38 ML/MIN/1.73M2
GFR SERPL CREATININE-BSD FRML MDRD: 39 ML/MIN/1.73M2
GFR SERPL CREATININE-BSD FRML MDRD: 39 ML/MIN/1.73M2
GFR SERPL CREATININE-BSD FRML MDRD: 40 ML/MIN/1.73M2
GFR SERPL CREATININE-BSD FRML MDRD: 40 ML/MIN/1.73M2
GFR SERPL CREATININE-BSD FRML MDRD: 41 ML/MIN/1.73M2
GFR SERPL CREATININE-BSD FRML MDRD: 42 ML/MIN/1.73M2
GFR SERPL CREATININE-BSD FRML MDRD: 43 ML/MIN/1.73M2
GFR SERPL CREATININE-BSD FRML MDRD: 44 ML/MIN/1.73M2
GFR SERPL CREATININE-BSD FRML MDRD: 46 ML/MIN/1.73M2
GFR SERPL CREATININE-BSD FRML MDRD: 46 ML/MIN/1.73M2
GFR SERPL CREATININE-BSD FRML MDRD: 47 ML/MIN/1.73M2
GFR SERPL CREATININE-BSD FRML MDRD: 47 ML/MIN/1.73M2
GFR SERPL CREATININE-BSD FRML MDRD: 48 ML/MIN/1.73M2
GFR SERPL CREATININE-BSD FRML MDRD: 49 ML/MIN/1.73M2
GFR SERPL CREATININE-BSD FRML MDRD: 50 ML/MIN/1.73M2
GFR SERPL CREATININE-BSD FRML MDRD: 51 ML/MIN/1.73M2
GFR SERPL CREATININE-BSD FRML MDRD: 52 ML/MIN/1.73M2
GFR SERPL CREATININE-BSD FRML MDRD: 53 ML/MIN/1.73M2
GFR SERPL CREATININE-BSD FRML MDRD: 54 ML/MIN/1.73M2
GFR SERPL CREATININE-BSD FRML MDRD: 54 ML/MIN/1.73M2
GFR SERPL CREATININE-BSD FRML MDRD: 55 ML/MIN/1.73M2
GFR SERPL CREATININE-BSD FRML MDRD: 55 ML/MIN/1.73M2
GFR SERPL CREATININE-BSD FRML MDRD: 56 ML/MIN/1.73M2
GFR SERPL CREATININE-BSD FRML MDRD: 57 ML/MIN/1.73M2
GFR SERPL CREATININE-BSD FRML MDRD: 58 ML/MIN/1.73M2
GFR SERPL CREATININE-BSD FRML MDRD: 58 ML/MIN/1.73M2
GFR SERPL CREATININE-BSD FRML MDRD: 59 ML/MIN/1.73M2
GFR SERPL CREATININE-BSD FRML MDRD: 61 ML/MIN/1.73M2
GFR SERPL CREATININE-BSD FRML MDRD: NORMAL ML/MIN/{1.73_M2}
GLUCOSE BLDC GLUCOMTR-MCNC: 100 MG/DL (ref 70–99)
GLUCOSE BLDC GLUCOMTR-MCNC: 101 MG/DL (ref 70–99)
GLUCOSE BLDC GLUCOMTR-MCNC: 104 MG/DL (ref 70–99)
GLUCOSE BLDC GLUCOMTR-MCNC: 105 MG/DL (ref 70–99)
GLUCOSE BLDC GLUCOMTR-MCNC: 106 MG/DL (ref 70–99)
GLUCOSE BLDC GLUCOMTR-MCNC: 106 MG/DL (ref 70–99)
GLUCOSE BLDC GLUCOMTR-MCNC: 107 MG/DL (ref 70–99)
GLUCOSE BLDC GLUCOMTR-MCNC: 108 MG/DL (ref 70–99)
GLUCOSE BLDC GLUCOMTR-MCNC: 108 MG/DL (ref 70–99)
GLUCOSE BLDC GLUCOMTR-MCNC: 109 MG/DL (ref 70–99)
GLUCOSE BLDC GLUCOMTR-MCNC: 110 MG/DL (ref 70–99)
GLUCOSE BLDC GLUCOMTR-MCNC: 111 MG/DL (ref 70–99)
GLUCOSE BLDC GLUCOMTR-MCNC: 112 MG/DL (ref 70–99)
GLUCOSE BLDC GLUCOMTR-MCNC: 112 MG/DL (ref 70–99)
GLUCOSE BLDC GLUCOMTR-MCNC: 113 MG/DL (ref 70–99)
GLUCOSE BLDC GLUCOMTR-MCNC: 114 MG/DL (ref 70–99)
GLUCOSE BLDC GLUCOMTR-MCNC: 115 MG/DL (ref 70–99)
GLUCOSE BLDC GLUCOMTR-MCNC: 116 MG/DL (ref 70–99)
GLUCOSE BLDC GLUCOMTR-MCNC: 117 MG/DL (ref 70–99)
GLUCOSE BLDC GLUCOMTR-MCNC: 119 MG/DL (ref 70–99)
GLUCOSE BLDC GLUCOMTR-MCNC: 121 MG/DL (ref 70–99)
GLUCOSE BLDC GLUCOMTR-MCNC: 122 MG/DL (ref 70–99)
GLUCOSE BLDC GLUCOMTR-MCNC: 122 MG/DL (ref 70–99)
GLUCOSE BLDC GLUCOMTR-MCNC: 124 MG/DL (ref 70–99)
GLUCOSE BLDC GLUCOMTR-MCNC: 125 MG/DL (ref 70–99)
GLUCOSE BLDC GLUCOMTR-MCNC: 125 MG/DL (ref 70–99)
GLUCOSE BLDC GLUCOMTR-MCNC: 127 MG/DL (ref 70–99)
GLUCOSE BLDC GLUCOMTR-MCNC: 127 MG/DL (ref 70–99)
GLUCOSE BLDC GLUCOMTR-MCNC: 129 MG/DL (ref 70–99)
GLUCOSE BLDC GLUCOMTR-MCNC: 130 MG/DL (ref 70–99)
GLUCOSE BLDC GLUCOMTR-MCNC: 131 MG/DL (ref 70–99)
GLUCOSE BLDC GLUCOMTR-MCNC: 137 MG/DL (ref 70–99)
GLUCOSE BLDC GLUCOMTR-MCNC: 138 MG/DL (ref 70–99)
GLUCOSE BLDC GLUCOMTR-MCNC: 138 MG/DL (ref 70–99)
GLUCOSE BLDC GLUCOMTR-MCNC: 140 MG/DL (ref 70–99)
GLUCOSE BLDC GLUCOMTR-MCNC: 141 MG/DL (ref 70–99)
GLUCOSE BLDC GLUCOMTR-MCNC: 142 MG/DL (ref 70–99)
GLUCOSE BLDC GLUCOMTR-MCNC: 142 MG/DL (ref 70–99)
GLUCOSE BLDC GLUCOMTR-MCNC: 143 MG/DL (ref 70–99)
GLUCOSE BLDC GLUCOMTR-MCNC: 144 MG/DL (ref 70–99)
GLUCOSE BLDC GLUCOMTR-MCNC: 144 MG/DL (ref 70–99)
GLUCOSE BLDC GLUCOMTR-MCNC: 145 MG/DL (ref 70–99)
GLUCOSE BLDC GLUCOMTR-MCNC: 145 MG/DL (ref 70–99)
GLUCOSE BLDC GLUCOMTR-MCNC: 147 MG/DL (ref 70–99)
GLUCOSE BLDC GLUCOMTR-MCNC: 147 MG/DL (ref 70–99)
GLUCOSE BLDC GLUCOMTR-MCNC: 148 MG/DL (ref 70–99)
GLUCOSE BLDC GLUCOMTR-MCNC: 149 MG/DL (ref 70–99)
GLUCOSE BLDC GLUCOMTR-MCNC: 150 MG/DL (ref 70–99)
GLUCOSE BLDC GLUCOMTR-MCNC: 152 MG/DL (ref 70–99)
GLUCOSE BLDC GLUCOMTR-MCNC: 153 MG/DL (ref 70–99)
GLUCOSE BLDC GLUCOMTR-MCNC: 156 MG/DL (ref 70–99)
GLUCOSE BLDC GLUCOMTR-MCNC: 156 MG/DL (ref 70–99)
GLUCOSE BLDC GLUCOMTR-MCNC: 157 MG/DL (ref 70–99)
GLUCOSE BLDC GLUCOMTR-MCNC: 158 MG/DL (ref 70–99)
GLUCOSE BLDC GLUCOMTR-MCNC: 158 MG/DL (ref 70–99)
GLUCOSE BLDC GLUCOMTR-MCNC: 159 MG/DL (ref 70–99)
GLUCOSE BLDC GLUCOMTR-MCNC: 159 MG/DL (ref 70–99)
GLUCOSE BLDC GLUCOMTR-MCNC: 160 MG/DL (ref 70–99)
GLUCOSE BLDC GLUCOMTR-MCNC: 161 MG/DL (ref 70–99)
GLUCOSE BLDC GLUCOMTR-MCNC: 162 MG/DL (ref 70–99)
GLUCOSE BLDC GLUCOMTR-MCNC: 163 MG/DL (ref 70–99)
GLUCOSE BLDC GLUCOMTR-MCNC: 166 MG/DL (ref 70–99)
GLUCOSE BLDC GLUCOMTR-MCNC: 167 MG/DL (ref 70–99)
GLUCOSE BLDC GLUCOMTR-MCNC: 168 MG/DL (ref 70–99)
GLUCOSE BLDC GLUCOMTR-MCNC: 169 MG/DL (ref 70–99)
GLUCOSE BLDC GLUCOMTR-MCNC: 170 MG/DL (ref 70–99)
GLUCOSE BLDC GLUCOMTR-MCNC: 171 MG/DL (ref 70–99)
GLUCOSE BLDC GLUCOMTR-MCNC: 171 MG/DL (ref 70–99)
GLUCOSE BLDC GLUCOMTR-MCNC: 172 MG/DL (ref 70–99)
GLUCOSE BLDC GLUCOMTR-MCNC: 172 MG/DL (ref 70–99)
GLUCOSE BLDC GLUCOMTR-MCNC: 173 MG/DL (ref 70–99)
GLUCOSE BLDC GLUCOMTR-MCNC: 174 MG/DL (ref 70–99)
GLUCOSE BLDC GLUCOMTR-MCNC: 174 MG/DL (ref 70–99)
GLUCOSE BLDC GLUCOMTR-MCNC: 175 MG/DL (ref 70–99)
GLUCOSE BLDC GLUCOMTR-MCNC: 176 MG/DL (ref 70–99)
GLUCOSE BLDC GLUCOMTR-MCNC: 178 MG/DL (ref 70–99)
GLUCOSE BLDC GLUCOMTR-MCNC: 178 MG/DL (ref 70–99)
GLUCOSE BLDC GLUCOMTR-MCNC: 181 MG/DL (ref 70–99)
GLUCOSE BLDC GLUCOMTR-MCNC: 183 MG/DL (ref 70–99)
GLUCOSE BLDC GLUCOMTR-MCNC: 183 MG/DL (ref 70–99)
GLUCOSE BLDC GLUCOMTR-MCNC: 184 MG/DL (ref 70–99)
GLUCOSE BLDC GLUCOMTR-MCNC: 187 MG/DL (ref 70–99)
GLUCOSE BLDC GLUCOMTR-MCNC: 189 MG/DL (ref 70–99)
GLUCOSE BLDC GLUCOMTR-MCNC: 189 MG/DL (ref 70–99)
GLUCOSE BLDC GLUCOMTR-MCNC: 190 MG/DL (ref 70–99)
GLUCOSE BLDC GLUCOMTR-MCNC: 191 MG/DL (ref 70–99)
GLUCOSE BLDC GLUCOMTR-MCNC: 193 MG/DL (ref 70–99)
GLUCOSE BLDC GLUCOMTR-MCNC: 195 MG/DL (ref 70–99)
GLUCOSE BLDC GLUCOMTR-MCNC: 195 MG/DL (ref 70–99)
GLUCOSE BLDC GLUCOMTR-MCNC: 196 MG/DL (ref 70–99)
GLUCOSE BLDC GLUCOMTR-MCNC: 199 MG/DL (ref 70–99)
GLUCOSE BLDC GLUCOMTR-MCNC: 199 MG/DL (ref 70–99)
GLUCOSE BLDC GLUCOMTR-MCNC: 203 MG/DL (ref 70–99)
GLUCOSE BLDC GLUCOMTR-MCNC: 205 MG/DL (ref 70–99)
GLUCOSE BLDC GLUCOMTR-MCNC: 205 MG/DL (ref 70–99)
GLUCOSE BLDC GLUCOMTR-MCNC: 206 MG/DL (ref 70–99)
GLUCOSE BLDC GLUCOMTR-MCNC: 209 MG/DL (ref 70–99)
GLUCOSE BLDC GLUCOMTR-MCNC: 209 MG/DL (ref 70–99)
GLUCOSE BLDC GLUCOMTR-MCNC: 211 MG/DL (ref 70–99)
GLUCOSE BLDC GLUCOMTR-MCNC: 212 MG/DL (ref 70–99)
GLUCOSE BLDC GLUCOMTR-MCNC: 213 MG/DL (ref 70–99)
GLUCOSE BLDC GLUCOMTR-MCNC: 216 MG/DL (ref 70–99)
GLUCOSE BLDC GLUCOMTR-MCNC: 217 MG/DL (ref 70–99)
GLUCOSE BLDC GLUCOMTR-MCNC: 217 MG/DL (ref 70–99)
GLUCOSE BLDC GLUCOMTR-MCNC: 218 MG/DL (ref 70–99)
GLUCOSE BLDC GLUCOMTR-MCNC: 220 MG/DL (ref 70–99)
GLUCOSE BLDC GLUCOMTR-MCNC: 220 MG/DL (ref 70–99)
GLUCOSE BLDC GLUCOMTR-MCNC: 221 MG/DL (ref 70–99)
GLUCOSE BLDC GLUCOMTR-MCNC: 223 MG/DL (ref 70–99)
GLUCOSE BLDC GLUCOMTR-MCNC: 223 MG/DL (ref 70–99)
GLUCOSE BLDC GLUCOMTR-MCNC: 224 MG/DL (ref 70–99)
GLUCOSE BLDC GLUCOMTR-MCNC: 224 MG/DL (ref 70–99)
GLUCOSE BLDC GLUCOMTR-MCNC: 225 MG/DL (ref 70–99)
GLUCOSE BLDC GLUCOMTR-MCNC: 225 MG/DL (ref 70–99)
GLUCOSE BLDC GLUCOMTR-MCNC: 227 MG/DL (ref 70–99)
GLUCOSE BLDC GLUCOMTR-MCNC: 228 MG/DL (ref 70–99)
GLUCOSE BLDC GLUCOMTR-MCNC: 228 MG/DL (ref 70–99)
GLUCOSE BLDC GLUCOMTR-MCNC: 229 MG/DL (ref 70–99)
GLUCOSE BLDC GLUCOMTR-MCNC: 229 MG/DL (ref 70–99)
GLUCOSE BLDC GLUCOMTR-MCNC: 230 MG/DL (ref 70–99)
GLUCOSE BLDC GLUCOMTR-MCNC: 232 MG/DL (ref 70–99)
GLUCOSE BLDC GLUCOMTR-MCNC: 232 MG/DL (ref 70–99)
GLUCOSE BLDC GLUCOMTR-MCNC: 233 MG/DL (ref 70–99)
GLUCOSE BLDC GLUCOMTR-MCNC: 234 MG/DL (ref 70–99)
GLUCOSE BLDC GLUCOMTR-MCNC: 235 MG/DL (ref 70–99)
GLUCOSE BLDC GLUCOMTR-MCNC: 237 MG/DL (ref 70–99)
GLUCOSE BLDC GLUCOMTR-MCNC: 237 MG/DL (ref 70–99)
GLUCOSE BLDC GLUCOMTR-MCNC: 238 MG/DL (ref 70–99)
GLUCOSE BLDC GLUCOMTR-MCNC: 239 MG/DL (ref 70–99)
GLUCOSE BLDC GLUCOMTR-MCNC: 239 MG/DL (ref 70–99)
GLUCOSE BLDC GLUCOMTR-MCNC: 241 MG/DL (ref 70–99)
GLUCOSE BLDC GLUCOMTR-MCNC: 241 MG/DL (ref 70–99)
GLUCOSE BLDC GLUCOMTR-MCNC: 242 MG/DL (ref 70–99)
GLUCOSE BLDC GLUCOMTR-MCNC: 244 MG/DL (ref 70–99)
GLUCOSE BLDC GLUCOMTR-MCNC: 244 MG/DL (ref 70–99)
GLUCOSE BLDC GLUCOMTR-MCNC: 249 MG/DL (ref 70–99)
GLUCOSE BLDC GLUCOMTR-MCNC: 250 MG/DL (ref 70–99)
GLUCOSE BLDC GLUCOMTR-MCNC: 251 MG/DL (ref 70–99)
GLUCOSE BLDC GLUCOMTR-MCNC: 255 MG/DL (ref 70–99)
GLUCOSE BLDC GLUCOMTR-MCNC: 255 MG/DL (ref 70–99)
GLUCOSE BLDC GLUCOMTR-MCNC: 256 MG/DL (ref 70–99)
GLUCOSE BLDC GLUCOMTR-MCNC: 259 MG/DL (ref 70–99)
GLUCOSE BLDC GLUCOMTR-MCNC: 262 MG/DL (ref 70–99)
GLUCOSE BLDC GLUCOMTR-MCNC: 265 MG/DL (ref 70–99)
GLUCOSE BLDC GLUCOMTR-MCNC: 266 MG/DL (ref 70–99)
GLUCOSE BLDC GLUCOMTR-MCNC: 267 MG/DL (ref 70–99)
GLUCOSE BLDC GLUCOMTR-MCNC: 269 MG/DL (ref 70–99)
GLUCOSE BLDC GLUCOMTR-MCNC: 270 MG/DL (ref 70–99)
GLUCOSE BLDC GLUCOMTR-MCNC: 271 MG/DL (ref 70–99)
GLUCOSE BLDC GLUCOMTR-MCNC: 273 MG/DL (ref 70–99)
GLUCOSE BLDC GLUCOMTR-MCNC: 277 MG/DL (ref 70–99)
GLUCOSE BLDC GLUCOMTR-MCNC: 278 MG/DL (ref 70–99)
GLUCOSE BLDC GLUCOMTR-MCNC: 279 MG/DL (ref 70–99)
GLUCOSE BLDC GLUCOMTR-MCNC: 279 MG/DL (ref 70–99)
GLUCOSE BLDC GLUCOMTR-MCNC: 282 MG/DL (ref 70–99)
GLUCOSE BLDC GLUCOMTR-MCNC: 284 MG/DL (ref 70–99)
GLUCOSE BLDC GLUCOMTR-MCNC: 287 MG/DL (ref 70–99)
GLUCOSE BLDC GLUCOMTR-MCNC: 292 MG/DL (ref 70–99)
GLUCOSE BLDC GLUCOMTR-MCNC: 296 MG/DL (ref 70–99)
GLUCOSE BLDC GLUCOMTR-MCNC: 309 MG/DL (ref 70–99)
GLUCOSE BLDC GLUCOMTR-MCNC: 314 MG/DL (ref 70–99)
GLUCOSE BLDC GLUCOMTR-MCNC: 320 MG/DL (ref 70–99)
GLUCOSE BLDC GLUCOMTR-MCNC: 322 MG/DL (ref 70–99)
GLUCOSE BLDC GLUCOMTR-MCNC: 328 MG/DL (ref 70–99)
GLUCOSE BLDC GLUCOMTR-MCNC: 331 MG/DL (ref 70–99)
GLUCOSE BLDC GLUCOMTR-MCNC: 331 MG/DL (ref 70–99)
GLUCOSE BLDC GLUCOMTR-MCNC: 333 MG/DL (ref 70–99)
GLUCOSE BLDC GLUCOMTR-MCNC: 340 MG/DL (ref 70–99)
GLUCOSE BLDC GLUCOMTR-MCNC: 341 MG/DL (ref 70–99)
GLUCOSE BLDC GLUCOMTR-MCNC: 353 MG/DL (ref 70–99)
GLUCOSE BLDC GLUCOMTR-MCNC: 375 MG/DL (ref 70–99)
GLUCOSE BLDC GLUCOMTR-MCNC: 382 MG/DL (ref 70–99)
GLUCOSE BLDC GLUCOMTR-MCNC: 395 MG/DL (ref 70–99)
GLUCOSE BLDC GLUCOMTR-MCNC: 399 MG/DL (ref 70–99)
GLUCOSE BLDC GLUCOMTR-MCNC: 414 MG/DL (ref 70–99)
GLUCOSE BLDC GLUCOMTR-MCNC: 425 MG/DL (ref 70–99)
GLUCOSE BLDC GLUCOMTR-MCNC: 428 MG/DL (ref 70–99)
GLUCOSE BLDC GLUCOMTR-MCNC: 54 MG/DL (ref 70–99)
GLUCOSE BLDC GLUCOMTR-MCNC: 58 MG/DL (ref 70–99)
GLUCOSE BLDC GLUCOMTR-MCNC: 62 MG/DL (ref 70–99)
GLUCOSE BLDC GLUCOMTR-MCNC: 62 MG/DL (ref 70–99)
GLUCOSE BLDC GLUCOMTR-MCNC: 63 MG/DL (ref 70–99)
GLUCOSE BLDC GLUCOMTR-MCNC: 64 MG/DL (ref 70–99)
GLUCOSE BLDC GLUCOMTR-MCNC: 65 MG/DL (ref 70–99)
GLUCOSE BLDC GLUCOMTR-MCNC: 67 MG/DL (ref 70–99)
GLUCOSE BLDC GLUCOMTR-MCNC: 68 MG/DL (ref 70–99)
GLUCOSE BLDC GLUCOMTR-MCNC: 70 MG/DL (ref 70–99)
GLUCOSE BLDC GLUCOMTR-MCNC: 71 MG/DL (ref 70–99)
GLUCOSE BLDC GLUCOMTR-MCNC: 72 MG/DL (ref 70–99)
GLUCOSE BLDC GLUCOMTR-MCNC: 73 MG/DL (ref 70–99)
GLUCOSE BLDC GLUCOMTR-MCNC: 73 MG/DL (ref 70–99)
GLUCOSE BLDC GLUCOMTR-MCNC: 74 MG/DL (ref 70–99)
GLUCOSE BLDC GLUCOMTR-MCNC: 75 MG/DL (ref 70–99)
GLUCOSE BLDC GLUCOMTR-MCNC: 77 MG/DL (ref 70–99)
GLUCOSE BLDC GLUCOMTR-MCNC: 80 MG/DL (ref 70–99)
GLUCOSE BLDC GLUCOMTR-MCNC: 81 MG/DL (ref 70–99)
GLUCOSE BLDC GLUCOMTR-MCNC: 81 MG/DL (ref 70–99)
GLUCOSE BLDC GLUCOMTR-MCNC: 83 MG/DL (ref 70–99)
GLUCOSE BLDC GLUCOMTR-MCNC: 83 MG/DL (ref 70–99)
GLUCOSE BLDC GLUCOMTR-MCNC: 87 MG/DL (ref 70–99)
GLUCOSE BLDC GLUCOMTR-MCNC: 87 MG/DL (ref 70–99)
GLUCOSE BLDC GLUCOMTR-MCNC: 89 MG/DL (ref 70–99)
GLUCOSE BLDC GLUCOMTR-MCNC: 89 MG/DL (ref 70–99)
GLUCOSE BLDC GLUCOMTR-MCNC: 90 MG/DL (ref 70–99)
GLUCOSE BLDC GLUCOMTR-MCNC: 91 MG/DL (ref 70–99)
GLUCOSE BLDC GLUCOMTR-MCNC: 91 MG/DL (ref 70–99)
GLUCOSE BLDC GLUCOMTR-MCNC: 93 MG/DL (ref 70–99)
GLUCOSE BLDC GLUCOMTR-MCNC: 94 MG/DL (ref 70–99)
GLUCOSE BLDC GLUCOMTR-MCNC: 95 MG/DL (ref 70–99)
GLUCOSE BLDC GLUCOMTR-MCNC: 98 MG/DL (ref 70–99)
GLUCOSE BLDC GLUCOMTR-MCNC: 99 MG/DL (ref 70–99)
GLUCOSE BLDC GLUCOMTR-MCNC: 99 MG/DL (ref 70–99)
GLUCOSE SERPL-MCNC: 102 MG/DL (ref 70–99)
GLUCOSE SERPL-MCNC: 103 MG/DL (ref 70–99)
GLUCOSE SERPL-MCNC: 110 MG/DL (ref 70–99)
GLUCOSE SERPL-MCNC: 120 MG/DL (ref 70–99)
GLUCOSE SERPL-MCNC: 120 MG/DL (ref 70–99)
GLUCOSE SERPL-MCNC: 125 MG/DL (ref 70–99)
GLUCOSE SERPL-MCNC: 125 MG/DL (ref 70–99)
GLUCOSE SERPL-MCNC: 126 MG/DL (ref 70–99)
GLUCOSE SERPL-MCNC: 126 MG/DL (ref 70–99)
GLUCOSE SERPL-MCNC: 131 MG/DL (ref 70–99)
GLUCOSE SERPL-MCNC: 135 MG/DL (ref 70–99)
GLUCOSE SERPL-MCNC: 140 MG/DL (ref 70–99)
GLUCOSE SERPL-MCNC: 143 MG/DL (ref 70–99)
GLUCOSE SERPL-MCNC: 151 MG/DL (ref 70–99)
GLUCOSE SERPL-MCNC: 156 MG/DL (ref 70–99)
GLUCOSE SERPL-MCNC: 159 MG/DL (ref 70–99)
GLUCOSE SERPL-MCNC: 165 MG/DL (ref 70–99)
GLUCOSE SERPL-MCNC: 170 MG/DL (ref 70–99)
GLUCOSE SERPL-MCNC: 179 MG/DL (ref 70–99)
GLUCOSE SERPL-MCNC: 179 MG/DL (ref 70–99)
GLUCOSE SERPL-MCNC: 185 MG/DL (ref 70–99)
GLUCOSE SERPL-MCNC: 192 MG/DL (ref 70–99)
GLUCOSE SERPL-MCNC: 204 MG/DL (ref 70–99)
GLUCOSE SERPL-MCNC: 207 MG/DL (ref 70–99)
GLUCOSE SERPL-MCNC: 208 MG/DL (ref 70–99)
GLUCOSE SERPL-MCNC: 209 MG/DL (ref 70–99)
GLUCOSE SERPL-MCNC: 210 MG/DL (ref 70–99)
GLUCOSE SERPL-MCNC: 211 MG/DL (ref 70–99)
GLUCOSE SERPL-MCNC: 212 MG/DL (ref 70–99)
GLUCOSE SERPL-MCNC: 215 MG/DL (ref 70–99)
GLUCOSE SERPL-MCNC: 223 MG/DL (ref 70–99)
GLUCOSE SERPL-MCNC: 235 MG/DL (ref 70–99)
GLUCOSE SERPL-MCNC: 238 MG/DL (ref 70–99)
GLUCOSE SERPL-MCNC: 245 MG/DL (ref 70–99)
GLUCOSE SERPL-MCNC: 246 MG/DL (ref 70–99)
GLUCOSE SERPL-MCNC: 252 MG/DL (ref 70–99)
GLUCOSE SERPL-MCNC: 255 MG/DL (ref 70–99)
GLUCOSE SERPL-MCNC: 258 MG/DL (ref 70–99)
GLUCOSE SERPL-MCNC: 259 MG/DL (ref 70–99)
GLUCOSE SERPL-MCNC: 285 MG/DL (ref 70–99)
GLUCOSE SERPL-MCNC: 292 MG/DL (ref 70–99)
GLUCOSE SERPL-MCNC: 301 MG/DL (ref 70–99)
GLUCOSE SERPL-MCNC: 318 MG/DL (ref 70–99)
GLUCOSE SERPL-MCNC: 328 MG/DL (ref 70–99)
GLUCOSE SERPL-MCNC: 329 MG/DL (ref 70–99)
GLUCOSE SERPL-MCNC: 332 MG/DL (ref 70–99)
GLUCOSE SERPL-MCNC: 346 MG/DL (ref 70–99)
GLUCOSE SERPL-MCNC: 357 MG/DL (ref 70–99)
GLUCOSE SERPL-MCNC: 51 MG/DL (ref 70–99)
GLUCOSE SERPL-MCNC: 60 MG/DL (ref 70–99)
GLUCOSE SERPL-MCNC: 64 MG/DL (ref 70–99)
GLUCOSE SERPL-MCNC: 71 MG/DL (ref 70–99)
GLUCOSE SERPL-MCNC: 83 MG/DL (ref 70–99)
GLUCOSE SERPL-MCNC: 93 MG/DL (ref 70–99)
GLUCOSE SERPL-MCNC: NORMAL MG/DL
GLUCOSE UR STRIP-MCNC: NEGATIVE MG/DL
GRAM STAIN RESULT: NORMAL
HCO3 BLD-SCNC: 30 MMOL/L (ref 21–28)
HCO3 BLDV-SCNC: 32 MMOL/L (ref 21–28)
HCO3 BLDV-SCNC: 41 MMOL/L (ref 21–28)
HCO3 BLDV-SCNC: 42 MMOL/L (ref 21–28)
HCO3 BLDV-SCNC: 42 MMOL/L (ref 21–28)
HCO3 BLDV-SCNC: 43 MMOL/L (ref 21–28)
HCO3 BLDV-SCNC: 43 MMOL/L (ref 21–28)
HCO3 BLDV-SCNC: 44 MMOL/L (ref 21–28)
HCO3 BLDV-SCNC: 45 MMOL/L (ref 21–28)
HCO3 BLDV-SCNC: 45 MMOL/L (ref 21–28)
HCT VFR BLD AUTO: 38.9 % (ref 35–47)
HCT VFR BLD AUTO: 39.1 % (ref 35–47)
HCT VFR BLD AUTO: 40.1 % (ref 35–47)
HCT VFR BLD AUTO: 41.3 % (ref 35–47)
HCT VFR BLD AUTO: 41.4 % (ref 35–47)
HCT VFR BLD AUTO: 41.6 % (ref 35–47)
HCT VFR BLD AUTO: 42.8 % (ref 35–47)
HCT VFR BLD AUTO: 43.5 % (ref 35–47)
HCT VFR BLD AUTO: 44.5 % (ref 35–47)
HCT VFR BLD AUTO: 44.8 % (ref 35–47)
HCT VFR BLD AUTO: 45.2 % (ref 35–47)
HCT VFR BLD AUTO: 45.7 % (ref 35–47)
HCT VFR BLD AUTO: 45.9 % (ref 35–47)
HCT VFR BLD AUTO: 45.9 % (ref 35–47)
HCT VFR BLD AUTO: 46 % (ref 35–47)
HCT VFR BLD AUTO: 46.2 % (ref 35–47)
HCT VFR BLD AUTO: 46.2 % (ref 35–47)
HCT VFR BLD AUTO: 46.8 % (ref 35–47)
HCT VFR BLD AUTO: 47.4 % (ref 35–47)
HCT VFR BLD AUTO: 48.9 % (ref 35–47)
HCT VFR BLD AUTO: 49.2 % (ref 35–47)
HCT VFR BLD AUTO: 49.7 % (ref 35–47)
HCT VFR BLD AUTO: 49.8 % (ref 35–47)
HCT VFR BLD AUTO: 50 % (ref 35–47)
HCT VFR BLD AUTO: 50.3 % (ref 35–47)
HCT VFR BLD AUTO: 50.5 % (ref 35–47)
HCT VFR BLD AUTO: 50.9 % (ref 35–47)
HCT VFR BLD AUTO: 52.2 % (ref 35–47)
HCV AB SERPL QL IA: NONREACTIVE
HGB BLD-MCNC: 12 G/DL (ref 11.7–15.7)
HGB BLD-MCNC: 12 G/DL (ref 11.7–15.7)
HGB BLD-MCNC: 12.1 G/DL (ref 11.7–15.7)
HGB BLD-MCNC: 12.3 G/DL (ref 11.7–15.7)
HGB BLD-MCNC: 12.3 G/DL (ref 11.7–15.7)
HGB BLD-MCNC: 13 G/DL (ref 11.7–15.7)
HGB BLD-MCNC: 13 G/DL (ref 11.7–15.7)
HGB BLD-MCNC: 13.2 G/DL (ref 11.7–15.7)
HGB BLD-MCNC: 13.6 G/DL (ref 11.7–15.7)
HGB BLD-MCNC: 13.7 G/DL (ref 11.7–15.7)
HGB BLD-MCNC: 13.8 G/DL (ref 11.7–15.7)
HGB BLD-MCNC: 13.9 G/DL (ref 11.7–15.7)
HGB BLD-MCNC: 14 G/DL (ref 11.7–15.7)
HGB BLD-MCNC: 14.1 G/DL (ref 11.7–15.7)
HGB BLD-MCNC: 14.1 G/DL (ref 11.7–15.7)
HGB BLD-MCNC: 14.2 G/DL (ref 11.7–15.7)
HGB BLD-MCNC: 14.2 G/DL (ref 11.7–15.7)
HGB BLD-MCNC: 14.8 G/DL (ref 11.7–15.7)
HGB BLD-MCNC: 14.9 G/DL (ref 11.7–15.7)
HGB BLD-MCNC: 15 G/DL (ref 11.7–15.7)
HGB BLD-MCNC: 15 G/DL (ref 11.7–15.7)
HGB BLD-MCNC: 15.1 G/DL (ref 11.7–15.7)
HGB BLD-MCNC: 15.2 G/DL (ref 11.7–15.7)
HGB BLD-MCNC: 15.2 G/DL (ref 11.7–15.7)
HGB BLD-MCNC: 15.3 G/DL (ref 11.7–15.7)
HGB BLD-MCNC: 15.6 G/DL (ref 11.7–15.7)
HGB UR QL STRIP: ABNORMAL
HIV 1+2 AB+HIV1 P24 AG SERPL QL IA: NONREACTIVE
HOLD SPECIMEN: NORMAL
HYALINE CASTS: 42 /LPF
HYALINE CASTS: 8 /LPF
IMM GRANULOCYTES # BLD: 0 10E3/UL
IMM GRANULOCYTES # BLD: 0.1 10E3/UL
IMM GRANULOCYTES # BLD: 0.2 10E3/UL
IMM GRANULOCYTES NFR BLD: 1 %
IMM GRANULOCYTES NFR BLD: 2 %
INR PPP: 1.44 (ref 0.85–1.15)
INR PPP: 1.57 (ref 0.85–1.15)
INTERPRETATION ECG - MUSE: NORMAL
KETONES UR STRIP-MCNC: NEGATIVE MG/DL
LACTATE SERPL-SCNC: 0.7 MMOL/L (ref 0.7–2)
LACTATE SERPL-SCNC: 1.1 MMOL/L (ref 0.7–2)
LACTATE SERPL-SCNC: 1.3 MMOL/L (ref 0.7–2)
LACTATE SERPL-SCNC: 1.5 MMOL/L (ref 0.7–2)
LACTATE SERPL-SCNC: 1.6 MMOL/L (ref 0.7–2)
LACTATE SERPL-SCNC: 2.2 MMOL/L (ref 0.7–2)
LEUKOCYTE ESTERASE UR QL STRIP: ABNORMAL
LEUKOCYTE ESTERASE UR QL STRIP: NEGATIVE
LVEF ECHO: NORMAL
LVEF ECHO: NORMAL
LYMPHOCYTES # BLD AUTO: 0.8 10E3/UL (ref 0.8–5.3)
LYMPHOCYTES # BLD AUTO: 1 10E3/UL (ref 0.8–5.3)
LYMPHOCYTES # BLD AUTO: 1.1 10E3/UL (ref 0.8–5.3)
LYMPHOCYTES # BLD AUTO: 1.2 10E3/UL (ref 0.8–5.3)
LYMPHOCYTES # BLD AUTO: 1.6 10E3/UL (ref 0.8–5.3)
LYMPHOCYTES # BLD AUTO: 2.1 10E3/UL (ref 0.8–5.3)
LYMPHOCYTES NFR BLD AUTO: 10 %
LYMPHOCYTES NFR BLD AUTO: 11 %
LYMPHOCYTES NFR BLD AUTO: 15 %
LYMPHOCYTES NFR BLD AUTO: 16 %
LYMPHOCYTES NFR BLD AUTO: 17 %
LYMPHOCYTES NFR BLD AUTO: 25 %
MAGNESIUM SERPL-MCNC: 1.5 MG/DL (ref 1.7–2.3)
MAGNESIUM SERPL-MCNC: 1.6 MG/DL (ref 1.7–2.3)
MAGNESIUM SERPL-MCNC: 1.7 MG/DL (ref 1.7–2.3)
MAGNESIUM SERPL-MCNC: 1.8 MG/DL (ref 1.7–2.3)
MAGNESIUM SERPL-MCNC: 1.8 MG/DL (ref 1.7–2.3)
MAGNESIUM SERPL-MCNC: 1.9 MG/DL (ref 1.7–2.3)
MAGNESIUM SERPL-MCNC: 2 MG/DL (ref 1.7–2.3)
MAGNESIUM SERPL-MCNC: 2.1 MG/DL (ref 1.7–2.3)
MAGNESIUM SERPL-MCNC: 2.2 MG/DL (ref 1.7–2.3)
MAGNESIUM SERPL-MCNC: 2.3 MG/DL (ref 1.7–2.3)
MAGNESIUM SERPL-MCNC: 2.3 MG/DL (ref 1.7–2.3)
MAGNESIUM SERPL-MCNC: 2.4 MG/DL (ref 1.7–2.3)
MAGNESIUM SERPL-MCNC: 2.4 MG/DL (ref 1.7–2.3)
MAGNESIUM SERPL-MCNC: NORMAL MG/DL
MCH RBC QN AUTO: 28.8 PG (ref 26.5–33)
MCH RBC QN AUTO: 28.8 PG (ref 26.5–33)
MCH RBC QN AUTO: 28.9 PG (ref 26.5–33)
MCH RBC QN AUTO: 29 PG (ref 26.5–33)
MCH RBC QN AUTO: 29.1 PG (ref 26.5–33)
MCH RBC QN AUTO: 29.2 PG (ref 26.5–33)
MCH RBC QN AUTO: 29.3 PG (ref 26.5–33)
MCH RBC QN AUTO: 29.4 PG (ref 26.5–33)
MCH RBC QN AUTO: 29.5 PG (ref 26.5–33)
MCH RBC QN AUTO: 29.6 PG (ref 26.5–33)
MCH RBC QN AUTO: 29.6 PG (ref 26.5–33)
MCH RBC QN AUTO: 29.7 PG (ref 26.5–33)
MCH RBC QN AUTO: 29.7 PG (ref 26.5–33)
MCH RBC QN AUTO: 29.8 PG (ref 26.5–33)
MCH RBC QN AUTO: 29.9 PG (ref 26.5–33)
MCHC RBC AUTO-ENTMCNC: 28.9 G/DL (ref 31.5–36.5)
MCHC RBC AUTO-ENTMCNC: 29.3 G/DL (ref 31.5–36.5)
MCHC RBC AUTO-ENTMCNC: 29.5 G/DL (ref 31.5–36.5)
MCHC RBC AUTO-ENTMCNC: 29.6 G/DL (ref 31.5–36.5)
MCHC RBC AUTO-ENTMCNC: 29.7 G/DL (ref 31.5–36.5)
MCHC RBC AUTO-ENTMCNC: 29.8 G/DL (ref 31.5–36.5)
MCHC RBC AUTO-ENTMCNC: 29.9 G/DL (ref 31.5–36.5)
MCHC RBC AUTO-ENTMCNC: 30 G/DL (ref 31.5–36.5)
MCHC RBC AUTO-ENTMCNC: 30.1 G/DL (ref 31.5–36.5)
MCHC RBC AUTO-ENTMCNC: 30.1 G/DL (ref 31.5–36.5)
MCHC RBC AUTO-ENTMCNC: 30.3 G/DL (ref 31.5–36.5)
MCHC RBC AUTO-ENTMCNC: 30.4 G/DL (ref 31.5–36.5)
MCHC RBC AUTO-ENTMCNC: 30.5 G/DL (ref 31.5–36.5)
MCHC RBC AUTO-ENTMCNC: 30.5 G/DL (ref 31.5–36.5)
MCHC RBC AUTO-ENTMCNC: 30.6 G/DL (ref 31.5–36.5)
MCHC RBC AUTO-ENTMCNC: 30.7 G/DL (ref 31.5–36.5)
MCHC RBC AUTO-ENTMCNC: 30.8 G/DL (ref 31.5–36.5)
MCHC RBC AUTO-ENTMCNC: 30.9 G/DL (ref 31.5–36.5)
MCHC RBC AUTO-ENTMCNC: 31 G/DL (ref 31.5–36.5)
MCHC RBC AUTO-ENTMCNC: 31.3 G/DL (ref 31.5–36.5)
MCHC RBC AUTO-ENTMCNC: 31.3 G/DL (ref 31.5–36.5)
MCV RBC AUTO: 100 FL (ref 78–100)
MCV RBC AUTO: 100 FL (ref 78–100)
MCV RBC AUTO: 103 FL (ref 78–100)
MCV RBC AUTO: 93 FL (ref 78–100)
MCV RBC AUTO: 95 FL (ref 78–100)
MCV RBC AUTO: 96 FL (ref 78–100)
MCV RBC AUTO: 97 FL (ref 78–100)
MCV RBC AUTO: 98 FL (ref 78–100)
MONOCYTES # BLD AUTO: 0.6 10E3/UL (ref 0–1.3)
MONOCYTES # BLD AUTO: 0.7 10E3/UL (ref 0–1.3)
MONOCYTES # BLD AUTO: 0.7 10E3/UL (ref 0–1.3)
MONOCYTES # BLD AUTO: 0.8 10E3/UL (ref 0–1.3)
MONOCYTES # BLD AUTO: 1 10E3/UL (ref 0–1.3)
MONOCYTES # BLD AUTO: 1.2 10E3/UL (ref 0–1.3)
MONOCYTES NFR BLD AUTO: 10 %
MONOCYTES NFR BLD AUTO: 11 %
MONOCYTES NFR BLD AUTO: 12 %
MONOCYTES NFR BLD AUTO: 15 %
MONOCYTES NFR BLD AUTO: 7 %
MONOCYTES NFR BLD AUTO: 8 %
MUCOUS THREADS #/AREA URNS LPF: PRESENT /LPF
MUCOUS THREADS #/AREA URNS LPF: PRESENT /LPF
NEUTROPHILS # BLD AUTO: 4.7 10E3/UL (ref 1.6–8.3)
NEUTROPHILS # BLD AUTO: 5.5 10E3/UL (ref 1.6–8.3)
NEUTROPHILS # BLD AUTO: 5.9 10E3/UL (ref 1.6–8.3)
NEUTROPHILS # BLD AUTO: 6 10E3/UL (ref 1.6–8.3)
NEUTROPHILS # BLD AUTO: 6.4 10E3/UL (ref 1.6–8.3)
NEUTROPHILS # BLD AUTO: 6.8 10E3/UL (ref 1.6–8.3)
NEUTROPHILS NFR BLD AUTO: 65 %
NEUTROPHILS NFR BLD AUTO: 70 %
NEUTROPHILS NFR BLD AUTO: 72 %
NEUTROPHILS NFR BLD AUTO: 73 %
NEUTROPHILS NFR BLD AUTO: 73 %
NEUTROPHILS NFR BLD AUTO: 76 %
NITRATE UR QL: NEGATIVE
NRBC # BLD AUTO: 0 10E3/UL
NRBC BLD AUTO-RTO: 0 /100
NT-PROBNP SERPL-MCNC: 5891 PG/ML (ref 0–450)
O2/TOTAL GAS SETTING VFR VENT: 21 %
O2/TOTAL GAS SETTING VFR VENT: 21 %
O2/TOTAL GAS SETTING VFR VENT: 30 %
P AXIS - MUSE: NORMAL DEGREES
PCO2 BLD: 50 MM HG (ref 35–45)
PCO2 BLDV: 54 MM HG (ref 40–50)
PCO2 BLDV: 62 MM HG (ref 40–50)
PCO2 BLDV: 68 MM HG (ref 40–50)
PCO2 BLDV: 69 MM HG (ref 40–50)
PCO2 BLDV: 75 MM HG (ref 40–50)
PCO2 BLDV: 76 MM HG (ref 40–50)
PCO2 BLDV: 83 MM HG (ref 40–50)
PH BLD: 7.39 [PH] (ref 7.35–7.45)
PH BLDV: 7.31 [PH] (ref 7.32–7.43)
PH BLDV: 7.35 [PH] (ref 7.32–7.43)
PH BLDV: 7.35 [PH] (ref 7.32–7.43)
PH BLDV: 7.37 [PH] (ref 7.32–7.43)
PH BLDV: 7.38 [PH] (ref 7.32–7.43)
PH BLDV: 7.39 [PH] (ref 7.32–7.43)
PH BLDV: 7.42 [PH] (ref 7.32–7.43)
PH BLDV: 7.42 [PH] (ref 7.32–7.43)
PH BLDV: 7.45 [PH] (ref 7.32–7.43)
PH UR STRIP: 5 [PH] (ref 5–7)
PH UR STRIP: 5 [PH] (ref 5–7)
PH UR STRIP: 5.5 [PH] (ref 5–7)
PH UR STRIP: 7 [PH] (ref 5–7)
PHOSPHATE SERPL-MCNC: 3 MG/DL (ref 2.5–4.5)
PHOSPHATE SERPL-MCNC: 3.2 MG/DL (ref 2.5–4.5)
PHOSPHATE SERPL-MCNC: 3.5 MG/DL (ref 2.5–4.5)
PHOSPHATE SERPL-MCNC: 3.5 MG/DL (ref 2.5–4.5)
PHOSPHATE SERPL-MCNC: 4.1 MG/DL (ref 2.5–4.5)
PHOSPHATE SERPL-MCNC: 4.2 MG/DL (ref 2.5–4.5)
PLATELET # BLD AUTO: 160 10E3/UL (ref 150–450)
PLATELET # BLD AUTO: 183 10E3/UL (ref 150–450)
PLATELET # BLD AUTO: 206 10E3/UL (ref 150–450)
PLATELET # BLD AUTO: 208 10E3/UL (ref 150–450)
PLATELET # BLD AUTO: 210 10E3/UL (ref 150–450)
PLATELET # BLD AUTO: 213 10E3/UL (ref 150–450)
PLATELET # BLD AUTO: 214 10E3/UL (ref 150–450)
PLATELET # BLD AUTO: 221 10E3/UL (ref 150–450)
PLATELET # BLD AUTO: 221 10E3/UL (ref 150–450)
PLATELET # BLD AUTO: 224 10E3/UL (ref 150–450)
PLATELET # BLD AUTO: 225 10E3/UL (ref 150–450)
PLATELET # BLD AUTO: 229 10E3/UL (ref 150–450)
PLATELET # BLD AUTO: 240 10E3/UL (ref 150–450)
PLATELET # BLD AUTO: 241 10E3/UL (ref 150–450)
PLATELET # BLD AUTO: 242 10E3/UL (ref 150–450)
PLATELET # BLD AUTO: 249 10E3/UL (ref 150–450)
PLATELET # BLD AUTO: 249 10E3/UL (ref 150–450)
PLATELET # BLD AUTO: 251 10E3/UL (ref 150–450)
PLATELET # BLD AUTO: 254 10E3/UL (ref 150–450)
PLATELET # BLD AUTO: 256 10E3/UL (ref 150–450)
PLATELET # BLD AUTO: 257 10E3/UL (ref 150–450)
PLATELET # BLD AUTO: 259 10E3/UL (ref 150–450)
PLATELET # BLD AUTO: 289 10E3/UL (ref 150–450)
PLATELET # BLD AUTO: 309 10E3/UL (ref 150–450)
PLATELET # BLD AUTO: 314 10E3/UL (ref 150–450)
PLATELET # BLD AUTO: 327 10E3/UL (ref 150–450)
PLATELET # BLD AUTO: 331 10E3/UL (ref 150–450)
PLATELET # BLD AUTO: 346 10E3/UL (ref 150–450)
PO2 BLD: 65 MM HG (ref 80–105)
PO2 BLDV: 35 MM HG (ref 25–47)
PO2 BLDV: 36 MM HG (ref 25–47)
PO2 BLDV: 36 MM HG (ref 25–47)
PO2 BLDV: 37 MM HG (ref 25–47)
PO2 BLDV: 37 MM HG (ref 25–47)
PO2 BLDV: 38 MM HG (ref 25–47)
PO2 BLDV: 38 MM HG (ref 25–47)
PO2 BLDV: 39 MM HG (ref 25–47)
PO2 BLDV: 41 MM HG (ref 25–47)
POTASSIUM SERPL-SCNC: 3.5 MMOL/L (ref 3.4–5.3)
POTASSIUM SERPL-SCNC: 3.6 MMOL/L (ref 3.4–5.3)
POTASSIUM SERPL-SCNC: 3.9 MMOL/L (ref 3.4–5.3)
POTASSIUM SERPL-SCNC: 4 MMOL/L (ref 3.4–5.3)
POTASSIUM SERPL-SCNC: 4.1 MMOL/L (ref 3.4–5.3)
POTASSIUM SERPL-SCNC: 4.2 MMOL/L (ref 3.4–5.3)
POTASSIUM SERPL-SCNC: 4.3 MMOL/L (ref 3.4–5.3)
POTASSIUM SERPL-SCNC: 4.4 MMOL/L (ref 3.4–5.3)
POTASSIUM SERPL-SCNC: 4.4 MMOL/L (ref 3.4–5.3)
POTASSIUM SERPL-SCNC: 4.5 MMOL/L (ref 3.4–5.3)
POTASSIUM SERPL-SCNC: 4.6 MMOL/L (ref 3.4–5.3)
POTASSIUM SERPL-SCNC: 4.7 MMOL/L (ref 3.4–5.3)
POTASSIUM SERPL-SCNC: 4.8 MMOL/L (ref 3.4–5.3)
POTASSIUM SERPL-SCNC: 4.9 MMOL/L (ref 3.4–5.3)
POTASSIUM SERPL-SCNC: 5 MMOL/L (ref 3.4–5.3)
POTASSIUM SERPL-SCNC: 5.2 MMOL/L (ref 3.4–5.3)
POTASSIUM SERPL-SCNC: 5.2 MMOL/L (ref 3.4–5.3)
POTASSIUM SERPL-SCNC: 5.3 MMOL/L (ref 3.4–5.3)
POTASSIUM SERPL-SCNC: 5.4 MMOL/L (ref 3.4–5.3)
POTASSIUM SERPL-SCNC: 5.5 MMOL/L (ref 3.4–5.3)
POTASSIUM SERPL-SCNC: 5.7 MMOL/L (ref 3.4–5.3)
POTASSIUM SERPL-SCNC: 5.7 MMOL/L (ref 3.4–5.3)
POTASSIUM SERPL-SCNC: 6.1 MMOL/L (ref 3.4–5.3)
POTASSIUM SERPL-SCNC: 6.8 MMOL/L (ref 3.4–5.3)
POTASSIUM SERPL-SCNC: 7.2 MMOL/L (ref 3.4–5.3)
POTASSIUM SERPL-SCNC: 7.3 MMOL/L (ref 3.4–5.3)
POTASSIUM SERPL-SCNC: NORMAL MMOL/L
POTASSIUM SERPL-SCNC: NORMAL MMOL/L
PR INTERVAL - MUSE: NORMAL MS
PROT SERPL-MCNC: 6.3 G/DL (ref 6.4–8.3)
PROT SERPL-MCNC: 6.6 G/DL (ref 6.4–8.3)
PROT SERPL-MCNC: 6.8 G/DL (ref 6.4–8.3)
PROT SERPL-MCNC: 7.5 G/DL (ref 6.4–8.3)
PROT SERPL-MCNC: 7.6 G/DL (ref 6.4–8.3)
PROT SERPL-MCNC: 7.6 G/DL (ref 6.4–8.3)
PROT SERPL-MCNC: 8.4 G/DL (ref 6.4–8.3)
QRS DURATION - MUSE: 72 MS
QRS DURATION - MUSE: 74 MS
QRS DURATION - MUSE: 82 MS
QRS DURATION - MUSE: 84 MS
QRS DURATION - MUSE: 86 MS
QRS DURATION - MUSE: 92 MS
QRS DURATION - MUSE: 92 MS
QT - MUSE: 292 MS
QT - MUSE: 314 MS
QT - MUSE: 318 MS
QT - MUSE: 338 MS
QT - MUSE: 356 MS
QT - MUSE: 364 MS
QT - MUSE: 402 MS
QTC - MUSE: 410 MS
QTC - MUSE: 426 MS
QTC - MUSE: 435 MS
QTC - MUSE: 443 MS
QTC - MUSE: 460 MS
QTC - MUSE: 466 MS
QTC - MUSE: 475 MS
R AXIS - MUSE: 147 DEGREES
R AXIS - MUSE: 153 DEGREES
R AXIS - MUSE: 160 DEGREES
R AXIS - MUSE: 181 DEGREES
R AXIS - MUSE: 209 DEGREES
R AXIS - MUSE: 217 DEGREES
R AXIS - MUSE: 219 DEGREES
RBC # BLD AUTO: 4.09 10E6/UL (ref 3.8–5.2)
RBC # BLD AUTO: 4.12 10E6/UL (ref 3.8–5.2)
RBC # BLD AUTO: 4.2 10E6/UL (ref 3.8–5.2)
RBC # BLD AUTO: 4.21 10E6/UL (ref 3.8–5.2)
RBC # BLD AUTO: 4.27 10E6/UL (ref 3.8–5.2)
RBC # BLD AUTO: 4.45 10E6/UL (ref 3.8–5.2)
RBC # BLD AUTO: 4.47 10E6/UL (ref 3.8–5.2)
RBC # BLD AUTO: 4.49 10E6/UL (ref 3.8–5.2)
RBC # BLD AUTO: 4.66 10E6/UL (ref 3.8–5.2)
RBC # BLD AUTO: 4.66 10E6/UL (ref 3.8–5.2)
RBC # BLD AUTO: 4.68 10E6/UL (ref 3.8–5.2)
RBC # BLD AUTO: 4.69 10E6/UL (ref 3.8–5.2)
RBC # BLD AUTO: 4.75 10E6/UL (ref 3.8–5.2)
RBC # BLD AUTO: 4.75 10E6/UL (ref 3.8–5.2)
RBC # BLD AUTO: 4.76 10E6/UL (ref 3.8–5.2)
RBC # BLD AUTO: 4.8 10E6/UL (ref 3.8–5.2)
RBC # BLD AUTO: 4.81 10E6/UL (ref 3.8–5.2)
RBC # BLD AUTO: 4.84 10E6/UL (ref 3.8–5.2)
RBC # BLD AUTO: 4.87 10E6/UL (ref 3.8–5.2)
RBC # BLD AUTO: 5.03 10E6/UL (ref 3.8–5.2)
RBC # BLD AUTO: 5.05 10E6/UL (ref 3.8–5.2)
RBC # BLD AUTO: 5.07 10E6/UL (ref 3.8–5.2)
RBC # BLD AUTO: 5.09 10E6/UL (ref 3.8–5.2)
RBC # BLD AUTO: 5.11 10E6/UL (ref 3.8–5.2)
RBC # BLD AUTO: 5.11 10E6/UL (ref 3.8–5.2)
RBC # BLD AUTO: 5.14 10E6/UL (ref 3.8–5.2)
RBC # BLD AUTO: 5.15 10E6/UL (ref 3.8–5.2)
RBC # BLD AUTO: 5.22 10E6/UL (ref 3.8–5.2)
RBC URINE: 1 /HPF
RBC URINE: 3 /HPF
RBC URINE: 9 /HPF
RBC URINE: >182 /HPF
RSV RNA SPEC NAA+PROBE: NEGATIVE
SARS-COV-2 RNA RESP QL NAA+PROBE: NEGATIVE
SODIUM SERPL-SCNC: 129 MMOL/L (ref 136–145)
SODIUM SERPL-SCNC: 130 MMOL/L (ref 136–145)
SODIUM SERPL-SCNC: 131 MMOL/L (ref 136–145)
SODIUM SERPL-SCNC: 132 MMOL/L (ref 136–145)
SODIUM SERPL-SCNC: 133 MMOL/L (ref 136–145)
SODIUM SERPL-SCNC: 133 MMOL/L (ref 136–145)
SODIUM SERPL-SCNC: 134 MMOL/L (ref 136–145)
SODIUM SERPL-SCNC: 135 MMOL/L (ref 136–145)
SODIUM SERPL-SCNC: 136 MMOL/L (ref 136–145)
SODIUM SERPL-SCNC: 137 MMOL/L (ref 136–145)
SODIUM SERPL-SCNC: 138 MMOL/L (ref 136–145)
SODIUM SERPL-SCNC: 139 MMOL/L (ref 136–145)
SODIUM SERPL-SCNC: 140 MMOL/L (ref 136–145)
SODIUM SERPL-SCNC: 140 MMOL/L (ref 136–145)
SODIUM SERPL-SCNC: 141 MMOL/L (ref 136–145)
SODIUM SERPL-SCNC: 142 MMOL/L (ref 136–145)
SODIUM SERPL-SCNC: 143 MMOL/L (ref 136–145)
SODIUM SERPL-SCNC: NORMAL MMOL/L
SODIUM UR-SCNC: 49 MMOL/L
SP GR UR STRIP: 1.01 (ref 1–1.03)
SP GR UR STRIP: 1.02 (ref 1–1.03)
SQUAMOUS EPITHELIAL: 1 /HPF
SQUAMOUS EPITHELIAL: 3 /HPF
SQUAMOUS EPITHELIAL: <1 /HPF
SYSTOLIC BLOOD PRESSURE - MUSE: NORMAL MMHG
T AXIS - MUSE: -20 DEGREES
T AXIS - MUSE: -85 DEGREES
T AXIS - MUSE: 116 DEGREES
T AXIS - MUSE: 2 DEGREES
T AXIS - MUSE: 43 DEGREES
T AXIS - MUSE: 74 DEGREES
T AXIS - MUSE: 94 DEGREES
T4 FREE SERPL-MCNC: 0.82 NG/DL (ref 0.9–1.7)
TROPONIN T SERPL HS-MCNC: 60 NG/L
TROPONIN T SERPL HS-MCNC: 75 NG/L
TSH SERPL DL<=0.005 MIU/L-ACNC: 20.4 UIU/ML (ref 0.3–4.2)
UROBILINOGEN UR STRIP-MCNC: 2 MG/DL
UROBILINOGEN UR STRIP-MCNC: 3 MG/DL
UROBILINOGEN UR STRIP-MCNC: 6 MG/DL
UROBILINOGEN UR STRIP-MCNC: NORMAL MG/DL
VENTRICULAR RATE- MUSE: 100 BPM
VENTRICULAR RATE- MUSE: 103 BPM
VENTRICULAR RATE- MUSE: 119 BPM
VENTRICULAR RATE- MUSE: 120 BPM
VENTRICULAR RATE- MUSE: 128 BPM
VENTRICULAR RATE- MUSE: 79 BPM
VENTRICULAR RATE- MUSE: 86 BPM
WBC # BLD AUTO: 10.2 10E3/UL (ref 4–11)
WBC # BLD AUTO: 10.3 10E3/UL (ref 4–11)
WBC # BLD AUTO: 10.4 10E3/UL (ref 4–11)
WBC # BLD AUTO: 5.4 10E3/UL (ref 4–11)
WBC # BLD AUTO: 6.4 10E3/UL (ref 4–11)
WBC # BLD AUTO: 6.5 10E3/UL (ref 4–11)
WBC # BLD AUTO: 6.6 10E3/UL (ref 4–11)
WBC # BLD AUTO: 6.8 10E3/UL (ref 4–11)
WBC # BLD AUTO: 7.1 10E3/UL (ref 4–11)
WBC # BLD AUTO: 7.1 10E3/UL (ref 4–11)
WBC # BLD AUTO: 7.4 10E3/UL (ref 4–11)
WBC # BLD AUTO: 7.5 10E3/UL (ref 4–11)
WBC # BLD AUTO: 7.8 10E3/UL (ref 4–11)
WBC # BLD AUTO: 7.9 10E3/UL (ref 4–11)
WBC # BLD AUTO: 8 10E3/UL (ref 4–11)
WBC # BLD AUTO: 8.1 10E3/UL (ref 4–11)
WBC # BLD AUTO: 8.1 10E3/UL (ref 4–11)
WBC # BLD AUTO: 8.2 10E3/UL (ref 4–11)
WBC # BLD AUTO: 8.4 10E3/UL (ref 4–11)
WBC # BLD AUTO: 8.4 10E3/UL (ref 4–11)
WBC # BLD AUTO: 8.5 10E3/UL (ref 4–11)
WBC # BLD AUTO: 8.8 10E3/UL (ref 4–11)
WBC # BLD AUTO: 9.4 10E3/UL (ref 4–11)
WBC # BLD AUTO: 9.5 10E3/UL (ref 4–11)
WBC CLUMPS #/AREA URNS HPF: PRESENT /HPF
WBC CLUMPS #/AREA URNS HPF: PRESENT /HPF
WBC URINE: <1 /HPF
WBC URINE: >182 /HPF

## 2023-01-01 PROCEDURE — 999N000248 HC STATISTIC IV INSERT WITH US BY RN

## 2023-01-01 PROCEDURE — 99232 SBSQ HOSP IP/OBS MODERATE 35: CPT | Performed by: PHYSICIAN ASSISTANT

## 2023-01-01 PROCEDURE — 250N000013 HC RX MED GY IP 250 OP 250 PS 637: Performed by: PHYSICIAN ASSISTANT

## 2023-01-01 PROCEDURE — 83735 ASSAY OF MAGNESIUM: CPT | Performed by: INTERNAL MEDICINE

## 2023-01-01 PROCEDURE — 97166 OT EVAL MOD COMPLEX 45 MIN: CPT | Mod: GO

## 2023-01-01 PROCEDURE — 97530 THERAPEUTIC ACTIVITIES: CPT | Mod: GP

## 2023-01-01 PROCEDURE — 99233 SBSQ HOSP IP/OBS HIGH 50: CPT | Performed by: PHYSICIAN ASSISTANT

## 2023-01-01 PROCEDURE — 36415 COLL VENOUS BLD VENIPUNCTURE: CPT | Performed by: NURSE PRACTITIONER

## 2023-01-01 PROCEDURE — 97110 THERAPEUTIC EXERCISES: CPT | Mod: GP | Performed by: REHABILITATION PRACTITIONER

## 2023-01-01 PROCEDURE — 999N000157 HC STATISTIC RCP TIME EA 10 MIN

## 2023-01-01 PROCEDURE — 250N000013 HC RX MED GY IP 250 OP 250 PS 637: Performed by: NURSE PRACTITIONER

## 2023-01-01 PROCEDURE — 250N000013 HC RX MED GY IP 250 OP 250 PS 637

## 2023-01-01 PROCEDURE — G1010 CDSM STANSON: HCPCS | Performed by: RADIOLOGY

## 2023-01-01 PROCEDURE — 250N000013 HC RX MED GY IP 250 OP 250 PS 637: Performed by: PHYSICAL MEDICINE & REHABILITATION

## 2023-01-01 PROCEDURE — 97535 SELF CARE MNGMENT TRAINING: CPT | Mod: GO

## 2023-01-01 PROCEDURE — 85027 COMPLETE CBC AUTOMATED: CPT | Performed by: PHYSICIAN ASSISTANT

## 2023-01-01 PROCEDURE — 83605 ASSAY OF LACTIC ACID: CPT | Performed by: STUDENT IN AN ORGANIZED HEALTH CARE EDUCATION/TRAINING PROGRAM

## 2023-01-01 PROCEDURE — 99222 1ST HOSP IP/OBS MODERATE 55: CPT | Performed by: PHYSICIAN ASSISTANT

## 2023-01-01 PROCEDURE — 99232 SBSQ HOSP IP/OBS MODERATE 35: CPT | Mod: FS

## 2023-01-01 PROCEDURE — 80048 BASIC METABOLIC PNL TOTAL CA: CPT

## 2023-01-01 PROCEDURE — 93005 ELECTROCARDIOGRAM TRACING: CPT

## 2023-01-01 PROCEDURE — C8924 2D TTE W OR W/O FOL W/CON,FU: HCPCS

## 2023-01-01 PROCEDURE — 97110 THERAPEUTIC EXERCISES: CPT | Mod: GP

## 2023-01-01 PROCEDURE — 97530 THERAPEUTIC ACTIVITIES: CPT | Mod: GO

## 2023-01-01 PROCEDURE — 250N000012 HC RX MED GY IP 250 OP 636 PS 637: Performed by: INTERNAL MEDICINE

## 2023-01-01 PROCEDURE — 99222 1ST HOSP IP/OBS MODERATE 55: CPT | Performed by: PHYSICAL MEDICINE & REHABILITATION

## 2023-01-01 PROCEDURE — 85025 COMPLETE CBC W/AUTO DIFF WBC: CPT

## 2023-01-01 PROCEDURE — 83735 ASSAY OF MAGNESIUM: CPT

## 2023-01-01 PROCEDURE — 214N000001 HC R&B CCU UMMC

## 2023-01-01 PROCEDURE — 99232 SBSQ HOSP IP/OBS MODERATE 35: CPT

## 2023-01-01 PROCEDURE — 99232 SBSQ HOSP IP/OBS MODERATE 35: CPT | Mod: FS | Performed by: CASE MANAGER/CARE COORDINATOR

## 2023-01-01 PROCEDURE — 99232 SBSQ HOSP IP/OBS MODERATE 35: CPT | Mod: FS | Performed by: NURSE PRACTITIONER

## 2023-01-01 PROCEDURE — 82803 BLOOD GASES ANY COMBINATION: CPT

## 2023-01-01 PROCEDURE — 250N000013 HC RX MED GY IP 250 OP 250 PS 637: Performed by: STUDENT IN AN ORGANIZED HEALTH CARE EDUCATION/TRAINING PROGRAM

## 2023-01-01 PROCEDURE — 128N000003 HC R&B REHAB

## 2023-01-01 PROCEDURE — 81001 URINALYSIS AUTO W/SCOPE: CPT

## 2023-01-01 PROCEDURE — 250N000013 HC RX MED GY IP 250 OP 250 PS 637: Performed by: INTERNAL MEDICINE

## 2023-01-01 PROCEDURE — 255N000002 HC RX 255 OP 636: Performed by: INTERNAL MEDICINE

## 2023-01-01 PROCEDURE — 84132 ASSAY OF SERUM POTASSIUM: CPT | Performed by: INTERNAL MEDICINE

## 2023-01-01 PROCEDURE — 97116 GAIT TRAINING THERAPY: CPT | Mod: GP

## 2023-01-01 PROCEDURE — 250N000013 HC RX MED GY IP 250 OP 250 PS 637: Performed by: CASE MANAGER/CARE COORDINATOR

## 2023-01-01 PROCEDURE — 36415 COLL VENOUS BLD VENIPUNCTURE: CPT | Performed by: PHYSICIAN ASSISTANT

## 2023-01-01 PROCEDURE — 272N000019 HC KIT OPEN ENDED DOUBLE LUMEN

## 2023-01-01 PROCEDURE — 94660 CPAP INITIATION&MGMT: CPT

## 2023-01-01 PROCEDURE — 84450 TRANSFERASE (AST) (SGOT): CPT | Performed by: CASE MANAGER/CARE COORDINATOR

## 2023-01-01 PROCEDURE — 81001 URINALYSIS AUTO W/SCOPE: CPT | Performed by: NURSE PRACTITIONER

## 2023-01-01 PROCEDURE — 250N000011 HC RX IP 250 OP 636: Performed by: NURSE PRACTITIONER

## 2023-01-01 PROCEDURE — 99291 CRITICAL CARE FIRST HOUR: CPT | Mod: GC | Performed by: STUDENT IN AN ORGANIZED HEALTH CARE EDUCATION/TRAINING PROGRAM

## 2023-01-01 PROCEDURE — 36415 COLL VENOUS BLD VENIPUNCTURE: CPT

## 2023-01-01 PROCEDURE — G0463 HOSPITAL OUTPT CLINIC VISIT: HCPCS | Mod: 25

## 2023-01-01 PROCEDURE — 99232 SBSQ HOSP IP/OBS MODERATE 35: CPT | Mod: GC | Performed by: INTERNAL MEDICINE

## 2023-01-01 PROCEDURE — 97110 THERAPEUTIC EXERCISES: CPT | Mod: GO

## 2023-01-01 PROCEDURE — 99223 1ST HOSP IP/OBS HIGH 75: CPT | Mod: GC | Performed by: INTERNAL MEDICINE

## 2023-01-01 PROCEDURE — 36592 COLLECT BLOOD FROM PICC: CPT | Performed by: CASE MANAGER/CARE COORDINATOR

## 2023-01-01 PROCEDURE — 82803 BLOOD GASES ANY COMBINATION: CPT | Performed by: STUDENT IN AN ORGANIZED HEALTH CARE EDUCATION/TRAINING PROGRAM

## 2023-01-01 PROCEDURE — 99239 HOSP IP/OBS DSCHRG MGMT >30: CPT | Mod: FS | Performed by: NURSE PRACTITIONER

## 2023-01-01 PROCEDURE — 36415 COLL VENOUS BLD VENIPUNCTURE: CPT | Performed by: INTERNAL MEDICINE

## 2023-01-01 PROCEDURE — 99232 SBSQ HOSP IP/OBS MODERATE 35: CPT | Performed by: CASE MANAGER/CARE COORDINATOR

## 2023-01-01 PROCEDURE — 97530 THERAPEUTIC ACTIVITIES: CPT | Mod: GP | Performed by: PHYSICAL THERAPIST

## 2023-01-01 PROCEDURE — 36592 COLLECT BLOOD FROM PICC: CPT

## 2023-01-01 PROCEDURE — 93926 LOWER EXTREMITY STUDY: CPT | Mod: 26 | Performed by: RADIOLOGY

## 2023-01-01 PROCEDURE — 80048 BASIC METABOLIC PNL TOTAL CA: CPT | Performed by: NURSE PRACTITIONER

## 2023-01-01 PROCEDURE — 80048 BASIC METABOLIC PNL TOTAL CA: CPT | Performed by: PHYSICIAN ASSISTANT

## 2023-01-01 PROCEDURE — 83735 ASSAY OF MAGNESIUM: CPT | Performed by: PHYSICIAN ASSISTANT

## 2023-01-01 PROCEDURE — 99232 SBSQ HOSP IP/OBS MODERATE 35: CPT | Performed by: NURSE PRACTITIONER

## 2023-01-01 PROCEDURE — 250N000011 HC RX IP 250 OP 636

## 2023-01-01 PROCEDURE — 84460 ALANINE AMINO (ALT) (SGPT): CPT | Performed by: CASE MANAGER/CARE COORDINATOR

## 2023-01-01 PROCEDURE — 84132 ASSAY OF SERUM POTASSIUM: CPT | Performed by: NURSE PRACTITIONER

## 2023-01-01 PROCEDURE — 99231 SBSQ HOSP IP/OBS SF/LOW 25: CPT | Performed by: PHYSICIAN ASSISTANT

## 2023-01-01 PROCEDURE — 97140 MANUAL THERAPY 1/> REGIONS: CPT | Mod: GP

## 2023-01-01 PROCEDURE — 85014 HEMATOCRIT: CPT

## 2023-01-01 PROCEDURE — 85027 COMPLETE CBC AUTOMATED: CPT

## 2023-01-01 PROCEDURE — 97530 THERAPEUTIC ACTIVITIES: CPT | Mod: GP | Performed by: STUDENT IN AN ORGANIZED HEALTH CARE EDUCATION/TRAINING PROGRAM

## 2023-01-01 PROCEDURE — 82803 BLOOD GASES ANY COMBINATION: CPT | Performed by: CASE MANAGER/CARE COORDINATOR

## 2023-01-01 PROCEDURE — 94640 AIRWAY INHALATION TREATMENT: CPT

## 2023-01-01 PROCEDURE — 84100 ASSAY OF PHOSPHORUS: CPT

## 2023-01-01 PROCEDURE — 83735 ASSAY OF MAGNESIUM: CPT | Performed by: NURSE PRACTITIONER

## 2023-01-01 PROCEDURE — 99232 SBSQ HOSP IP/OBS MODERATE 35: CPT | Mod: 25 | Performed by: NURSE PRACTITIONER

## 2023-01-01 PROCEDURE — 99232 SBSQ HOSP IP/OBS MODERATE 35: CPT | Performed by: PHYSICAL MEDICINE & REHABILITATION

## 2023-01-01 PROCEDURE — 97110 THERAPEUTIC EXERCISES: CPT | Mod: GP | Performed by: PHYSICAL THERAPIST

## 2023-01-01 PROCEDURE — 82040 ASSAY OF SERUM ALBUMIN: CPT

## 2023-01-01 PROCEDURE — 250N000012 HC RX MED GY IP 250 OP 636 PS 637: Performed by: PHYSICIAN ASSISTANT

## 2023-01-01 PROCEDURE — 71045 X-RAY EXAM CHEST 1 VIEW: CPT

## 2023-01-01 PROCEDURE — 99232 SBSQ HOSP IP/OBS MODERATE 35: CPT | Mod: FS | Performed by: PHYSICIAN ASSISTANT

## 2023-01-01 PROCEDURE — 96374 THER/PROPH/DIAG INJ IV PUSH: CPT | Performed by: NURSE PRACTITIONER

## 2023-01-01 PROCEDURE — 250N000009 HC RX 250: Performed by: NURSE PRACTITIONER

## 2023-01-01 PROCEDURE — 82310 ASSAY OF CALCIUM: CPT | Performed by: PHYSICIAN ASSISTANT

## 2023-01-01 PROCEDURE — 99232 SBSQ HOSP IP/OBS MODERATE 35: CPT | Performed by: INTERNAL MEDICINE

## 2023-01-01 PROCEDURE — 82140 ASSAY OF AMMONIA: CPT

## 2023-01-01 PROCEDURE — 82040 ASSAY OF SERUM ALBUMIN: CPT | Performed by: INTERNAL MEDICINE

## 2023-01-01 PROCEDURE — 90832 PSYTX W PT 30 MINUTES: CPT | Performed by: CLINICAL NEUROPSYCHOLOGIST

## 2023-01-01 PROCEDURE — 85027 COMPLETE CBC AUTOMATED: CPT | Performed by: NURSE PRACTITIONER

## 2023-01-01 PROCEDURE — 87389 HIV-1 AG W/HIV-1&-2 AB AG IA: CPT

## 2023-01-01 PROCEDURE — 73620 X-RAY EXAM OF FOOT: CPT | Mod: 26 | Performed by: RADIOLOGY

## 2023-01-01 PROCEDURE — 80053 COMPREHEN METABOLIC PANEL: CPT | Performed by: PHYSICIAN ASSISTANT

## 2023-01-01 PROCEDURE — 71250 CT THORAX DX C-: CPT | Mod: MG

## 2023-01-01 PROCEDURE — 99221 1ST HOSP IP/OBS SF/LOW 40: CPT | Mod: GC | Performed by: SURGERY

## 2023-01-01 PROCEDURE — 80053 COMPREHEN METABOLIC PANEL: CPT

## 2023-01-01 PROCEDURE — 36569 INSJ PICC 5 YR+ W/O IMAGING: CPT

## 2023-01-01 PROCEDURE — 71045 X-RAY EXAM CHEST 1 VIEW: CPT | Mod: 26 | Performed by: RADIOLOGY

## 2023-01-01 PROCEDURE — 97140 MANUAL THERAPY 1/> REGIONS: CPT | Mod: GP | Performed by: PHYSICAL THERAPIST

## 2023-01-01 PROCEDURE — 999N000111 HC STATISTIC OT IP EVAL DEFER: Performed by: OCCUPATIONAL THERAPIST

## 2023-01-01 PROCEDURE — G0463 HOSPITAL OUTPT CLINIC VISIT: HCPCS

## 2023-01-01 PROCEDURE — 72100 X-RAY EXAM L-S SPINE 2/3 VWS: CPT

## 2023-01-01 PROCEDURE — 250N000011 HC RX IP 250 OP 636: Performed by: STUDENT IN AN ORGANIZED HEALTH CARE EDUCATION/TRAINING PROGRAM

## 2023-01-01 PROCEDURE — 258N000001 HC RX 258

## 2023-01-01 PROCEDURE — 97535 SELF CARE MNGMENT TRAINING: CPT | Mod: GO | Performed by: STUDENT IN AN ORGANIZED HEALTH CARE EDUCATION/TRAINING PROGRAM

## 2023-01-01 PROCEDURE — 86140 C-REACTIVE PROTEIN: CPT | Performed by: PHYSICIAN ASSISTANT

## 2023-01-01 PROCEDURE — 250N000009 HC RX 250

## 2023-01-01 PROCEDURE — 84155 ASSAY OF PROTEIN SERUM: CPT | Performed by: STUDENT IN AN ORGANIZED HEALTH CARE EDUCATION/TRAINING PROGRAM

## 2023-01-01 PROCEDURE — 87086 URINE CULTURE/COLONY COUNT: CPT | Performed by: PHYSICIAN ASSISTANT

## 2023-01-01 PROCEDURE — 93926 LOWER EXTREMITY STUDY: CPT | Mod: RT

## 2023-01-01 PROCEDURE — 80048 BASIC METABOLIC PNL TOTAL CA: CPT | Performed by: CASE MANAGER/CARE COORDINATOR

## 2023-01-01 PROCEDURE — 83605 ASSAY OF LACTIC ACID: CPT | Performed by: NURSE PRACTITIONER

## 2023-01-01 PROCEDURE — 86803 HEPATITIS C AB TEST: CPT

## 2023-01-01 PROCEDURE — 93308 TTE F-UP OR LMTD: CPT | Mod: 26 | Performed by: INTERNAL MEDICINE

## 2023-01-01 PROCEDURE — 120N000003 HC R&B IMCU UMMC

## 2023-01-01 PROCEDURE — 84100 ASSAY OF PHOSPHORUS: CPT | Performed by: PHYSICIAN ASSISTANT

## 2023-01-01 PROCEDURE — 87086 URINE CULTURE/COLONY COUNT: CPT

## 2023-01-01 PROCEDURE — C9803 HOPD COVID-19 SPEC COLLECT: HCPCS | Performed by: NURSE PRACTITIONER

## 2023-01-01 PROCEDURE — 76882 US LMTD JT/FCL EVL NVASC XTR: CPT | Mod: 26 | Performed by: RADIOLOGY

## 2023-01-01 PROCEDURE — 93325 DOPPLER ECHO COLOR FLOW MAPG: CPT | Mod: 26 | Performed by: INTERNAL MEDICINE

## 2023-01-01 PROCEDURE — 93010 ELECTROCARDIOGRAM REPORT: CPT | Mod: 76 | Performed by: INTERNAL MEDICINE

## 2023-01-01 PROCEDURE — 82310 ASSAY OF CALCIUM: CPT

## 2023-01-01 PROCEDURE — U0005 INFEC AGEN DETEC AMPLI PROBE: HCPCS | Performed by: PHYSICIAN ASSISTANT

## 2023-01-01 PROCEDURE — 82310 ASSAY OF CALCIUM: CPT | Performed by: CASE MANAGER/CARE COORDINATOR

## 2023-01-01 PROCEDURE — 99207 PR NO CHARGE LOS: CPT | Performed by: INTERNAL MEDICINE

## 2023-01-01 PROCEDURE — 999N000044 HC STATISTIC CVC DRESSING CHANGE

## 2023-01-01 PROCEDURE — 85025 COMPLETE CBC W/AUTO DIFF WBC: CPT | Performed by: STUDENT IN AN ORGANIZED HEALTH CARE EDUCATION/TRAINING PROGRAM

## 2023-01-01 PROCEDURE — 99233 SBSQ HOSP IP/OBS HIGH 50: CPT | Performed by: NURSE PRACTITIONER

## 2023-01-01 PROCEDURE — 82248 BILIRUBIN DIRECT: CPT

## 2023-01-01 PROCEDURE — 250N000012 HC RX MED GY IP 250 OP 636 PS 637

## 2023-01-01 PROCEDURE — 97535 SELF CARE MNGMENT TRAINING: CPT | Mod: GO | Performed by: OCCUPATIONAL THERAPIST

## 2023-01-01 PROCEDURE — 272N000452 HC KIT SHRLOCK 5FR POWER PICC TRIPLE LUMEN

## 2023-01-01 PROCEDURE — 82803 BLOOD GASES ANY COMBINATION: CPT | Performed by: NURSE PRACTITIONER

## 2023-01-01 PROCEDURE — 84443 ASSAY THYROID STIM HORMONE: CPT | Performed by: NURSE PRACTITIONER

## 2023-01-01 PROCEDURE — 999N000127 HC STATISTIC PERIPHERAL IV START W US GUIDANCE

## 2023-01-01 PROCEDURE — 99222 1ST HOSP IP/OBS MODERATE 55: CPT | Mod: AI | Performed by: INTERNAL MEDICINE

## 2023-01-01 PROCEDURE — 73552 X-RAY EXAM OF FEMUR 2/>: CPT | Mod: RT

## 2023-01-01 PROCEDURE — 36416 COLLJ CAPILLARY BLOOD SPEC: CPT

## 2023-01-01 PROCEDURE — 82550 ASSAY OF CK (CPK): CPT | Performed by: PHYSICIAN ASSISTANT

## 2023-01-01 PROCEDURE — 99223 1ST HOSP IP/OBS HIGH 75: CPT | Performed by: NURSE PRACTITIONER

## 2023-01-01 PROCEDURE — 250N000012 HC RX MED GY IP 250 OP 636 PS 637: Performed by: NURSE PRACTITIONER

## 2023-01-01 PROCEDURE — 84484 ASSAY OF TROPONIN QUANT: CPT | Performed by: NURSE PRACTITIONER

## 2023-01-01 PROCEDURE — 999N000125 HC STATISTIC PATIENT MED CONFERENCE < 30 MIN

## 2023-01-01 PROCEDURE — 999N000150 HC STATISTIC PT MED CONFERENCE < 30 MIN: Performed by: PHYSICAL THERAPIST

## 2023-01-01 PROCEDURE — 93922 UPR/L XTREMITY ART 2 LEVELS: CPT | Mod: 52

## 2023-01-01 PROCEDURE — 250N000012 HC RX MED GY IP 250 OP 636 PS 637: Performed by: STUDENT IN AN ORGANIZED HEALTH CARE EDUCATION/TRAINING PROGRAM

## 2023-01-01 PROCEDURE — 97124 MASSAGE THERAPY: CPT | Performed by: PHYSICAL THERAPIST

## 2023-01-01 PROCEDURE — 93306 TTE W/DOPPLER COMPLETE: CPT | Mod: 26 | Performed by: STUDENT IN AN ORGANIZED HEALTH CARE EDUCATION/TRAINING PROGRAM

## 2023-01-01 PROCEDURE — 80051 ELECTROLYTE PANEL: CPT | Performed by: NURSE PRACTITIONER

## 2023-01-01 PROCEDURE — 99221 1ST HOSP IP/OBS SF/LOW 40: CPT | Mod: GC | Performed by: INTERNAL MEDICINE

## 2023-01-01 PROCEDURE — 85025 COMPLETE CBC W/AUTO DIFF WBC: CPT | Performed by: NURSE PRACTITIONER

## 2023-01-01 PROCEDURE — 250N000011 HC RX IP 250 OP 636: Performed by: INTERNAL MEDICINE

## 2023-01-01 PROCEDURE — 85610 PROTHROMBIN TIME: CPT

## 2023-01-01 PROCEDURE — 99231 SBSQ HOSP IP/OBS SF/LOW 25: CPT

## 2023-01-01 PROCEDURE — 99233 SBSQ HOSP IP/OBS HIGH 50: CPT | Mod: GC | Performed by: STUDENT IN AN ORGANIZED HEALTH CARE EDUCATION/TRAINING PROGRAM

## 2023-01-01 PROCEDURE — 97530 THERAPEUTIC ACTIVITIES: CPT | Mod: GP | Performed by: REHABILITATION PRACTITIONER

## 2023-01-01 PROCEDURE — 99231 SBSQ HOSP IP/OBS SF/LOW 25: CPT | Mod: GC | Performed by: INTERNAL MEDICINE

## 2023-01-01 PROCEDURE — 97162 PT EVAL MOD COMPLEX 30 MIN: CPT | Mod: GP

## 2023-01-01 PROCEDURE — 99285 EMERGENCY DEPT VISIT HI MDM: CPT | Mod: CS,25 | Performed by: NURSE PRACTITIONER

## 2023-01-01 PROCEDURE — 93010 ELECTROCARDIOGRAM REPORT: CPT | Performed by: INTERNAL MEDICINE

## 2023-01-01 PROCEDURE — 82248 BILIRUBIN DIRECT: CPT | Performed by: PHYSICIAN ASSISTANT

## 2023-01-01 PROCEDURE — 93321 DOPPLER ECHO F-UP/LMTD STD: CPT | Mod: 26 | Performed by: INTERNAL MEDICINE

## 2023-01-01 PROCEDURE — 82803 BLOOD GASES ANY COMBINATION: CPT | Performed by: INTERNAL MEDICINE

## 2023-01-01 PROCEDURE — 72170 X-RAY EXAM OF PELVIS: CPT

## 2023-01-01 PROCEDURE — 87205 SMEAR GRAM STAIN: CPT

## 2023-01-01 PROCEDURE — U0005 INFEC AGEN DETEC AMPLI PROBE: HCPCS | Performed by: INTERNAL MEDICINE

## 2023-01-01 PROCEDURE — 99239 HOSP IP/OBS DSCHRG MGMT >30: CPT | Mod: GC | Performed by: INTERNAL MEDICINE

## 2023-01-01 PROCEDURE — 200N000002 HC R&B ICU UMMC

## 2023-01-01 PROCEDURE — 999N000208 ECHOCARDIOGRAM COMPLETE

## 2023-01-01 PROCEDURE — 99207 PR NO BILLABLE SERVICE THIS VISIT: CPT | Performed by: NURSE PRACTITIONER

## 2023-01-01 PROCEDURE — 99233 SBSQ HOSP IP/OBS HIGH 50: CPT | Mod: GC | Performed by: INTERNAL MEDICINE

## 2023-01-01 PROCEDURE — 272N000278 HC DEVICE 5FR SECURACATH

## 2023-01-01 PROCEDURE — 999N000150 HC STATISTIC PT MED CONFERENCE < 30 MIN

## 2023-01-01 PROCEDURE — 258N000001 HC RX 258: Performed by: NURSE PRACTITIONER

## 2023-01-01 PROCEDURE — 80053 COMPREHEN METABOLIC PANEL: CPT | Performed by: NURSE PRACTITIONER

## 2023-01-01 PROCEDURE — 120N000002 HC R&B MED SURG/OB UMMC

## 2023-01-01 PROCEDURE — 250N000011 HC RX IP 250 OP 636: Performed by: CASE MANAGER/CARE COORDINATOR

## 2023-01-01 PROCEDURE — 99232 SBSQ HOSP IP/OBS MODERATE 35: CPT | Mod: 25 | Performed by: CASE MANAGER/CARE COORDINATOR

## 2023-01-01 PROCEDURE — 86140 C-REACTIVE PROTEIN: CPT | Performed by: NURSE PRACTITIONER

## 2023-01-01 PROCEDURE — 36592 COLLECT BLOOD FROM PICC: CPT | Performed by: STUDENT IN AN ORGANIZED HEALTH CARE EDUCATION/TRAINING PROGRAM

## 2023-01-01 PROCEDURE — 93010 ELECTROCARDIOGRAM REPORT: CPT | Performed by: NURSE PRACTITIONER

## 2023-01-01 PROCEDURE — 97116 GAIT TRAINING THERAPY: CPT | Mod: GP | Performed by: PHYSICAL THERAPIST

## 2023-01-01 PROCEDURE — 71250 CT THORAX DX C-: CPT | Mod: 26 | Performed by: RADIOLOGY

## 2023-01-01 PROCEDURE — 83880 ASSAY OF NATRIURETIC PEPTIDE: CPT | Performed by: NURSE PRACTITIONER

## 2023-01-01 PROCEDURE — 36415 COLL VENOUS BLD VENIPUNCTURE: CPT | Performed by: CASE MANAGER/CARE COORDINATOR

## 2023-01-01 PROCEDURE — 99232 SBSQ HOSP IP/OBS MODERATE 35: CPT | Mod: GC | Performed by: PHYSICAL MEDICINE & REHABILITATION

## 2023-01-01 PROCEDURE — 999N000208 ECHOCARDIOGRAM LIMITED

## 2023-01-01 PROCEDURE — 85041 AUTOMATED RBC COUNT: CPT

## 2023-01-01 PROCEDURE — 99239 HOSP IP/OBS DSCHRG MGMT >30: CPT | Mod: FS | Performed by: PHYSICIAN ASSISTANT

## 2023-01-01 PROCEDURE — 999N000065 XR CHEST PORT 1 VIEW

## 2023-01-01 PROCEDURE — 82010 KETONE BODYS QUAN: CPT | Performed by: STUDENT IN AN ORGANIZED HEALTH CARE EDUCATION/TRAINING PROGRAM

## 2023-01-01 PROCEDURE — 93971 EXTREMITY STUDY: CPT | Mod: 26 | Performed by: RADIOLOGY

## 2023-01-01 PROCEDURE — 36600 WITHDRAWAL OF ARTERIAL BLOOD: CPT

## 2023-01-01 PROCEDURE — 97140 MANUAL THERAPY 1/> REGIONS: CPT | Mod: GO | Performed by: OCCUPATIONAL THERAPIST

## 2023-01-01 PROCEDURE — 93005 ELECTROCARDIOGRAM TRACING: CPT | Performed by: NURSE PRACTITIONER

## 2023-01-01 PROCEDURE — 93922 UPR/L XTREMITY ART 2 LEVELS: CPT | Mod: 26 | Performed by: STUDENT IN AN ORGANIZED HEALTH CARE EDUCATION/TRAINING PROGRAM

## 2023-01-01 PROCEDURE — 99233 SBSQ HOSP IP/OBS HIGH 50: CPT | Performed by: CLINICAL NURSE SPECIALIST

## 2023-01-01 PROCEDURE — 99232 SBSQ HOSP IP/OBS MODERATE 35: CPT | Performed by: CLINICAL NURSE SPECIALIST

## 2023-01-01 PROCEDURE — 250N000009 HC RX 250: Performed by: PHYSICIAN ASSISTANT

## 2023-01-01 PROCEDURE — 999N000125 HC STATISTIC PATIENT MED CONFERENCE < 30 MIN: Performed by: STUDENT IN AN ORGANIZED HEALTH CARE EDUCATION/TRAINING PROGRAM

## 2023-01-01 PROCEDURE — 93971 EXTREMITY STUDY: CPT | Mod: RT

## 2023-01-01 PROCEDURE — 99233 SBSQ HOSP IP/OBS HIGH 50: CPT | Mod: 25 | Performed by: INTERNAL MEDICINE

## 2023-01-01 PROCEDURE — 81001 URINALYSIS AUTO W/SCOPE: CPT | Performed by: PHYSICIAN ASSISTANT

## 2023-01-01 PROCEDURE — 99233 SBSQ HOSP IP/OBS HIGH 50: CPT | Mod: FS | Performed by: NURSE PRACTITIONER

## 2023-01-01 PROCEDURE — 5A09357 ASSISTANCE WITH RESPIRATORY VENTILATION, LESS THAN 24 CONSECUTIVE HOURS, CONTINUOUS POSITIVE AIRWAY PRESSURE: ICD-10-PCS | Performed by: INTERNAL MEDICINE

## 2023-01-01 PROCEDURE — 83605 ASSAY OF LACTIC ACID: CPT | Performed by: INTERNAL MEDICINE

## 2023-01-01 PROCEDURE — 99233 SBSQ HOSP IP/OBS HIGH 50: CPT

## 2023-01-01 PROCEDURE — 76882 US LMTD JT/FCL EVL NVASC XTR: CPT | Mod: RT

## 2023-01-01 PROCEDURE — 97163 PT EVAL HIGH COMPLEX 45 MIN: CPT | Mod: GP | Performed by: STUDENT IN AN ORGANIZED HEALTH CARE EDUCATION/TRAINING PROGRAM

## 2023-01-01 PROCEDURE — 84300 ASSAY OF URINE SODIUM: CPT | Performed by: PHYSICIAN ASSISTANT

## 2023-01-01 PROCEDURE — 90791 PSYCH DIAGNOSTIC EVALUATION: CPT | Performed by: CLINICAL NEUROPSYCHOLOGIST

## 2023-01-01 PROCEDURE — 258N000001 HC RX 258: Performed by: STUDENT IN AN ORGANIZED HEALTH CARE EDUCATION/TRAINING PROGRAM

## 2023-01-01 PROCEDURE — 97602 WOUND(S) CARE NON-SELECTIVE: CPT

## 2023-01-01 PROCEDURE — 84439 ASSAY OF FREE THYROXINE: CPT | Performed by: NURSE PRACTITIONER

## 2023-01-01 PROCEDURE — 72070 X-RAY EXAM THORAC SPINE 2VWS: CPT

## 2023-01-01 PROCEDURE — 99285 EMERGENCY DEPT VISIT HI MDM: CPT | Mod: CS | Performed by: NURSE PRACTITIONER

## 2023-01-01 PROCEDURE — 258N000003 HC RX IP 258 OP 636

## 2023-01-01 PROCEDURE — 73620 X-RAY EXAM OF FOOT: CPT | Mod: RT

## 2023-01-01 PROCEDURE — 83735 ASSAY OF MAGNESIUM: CPT | Performed by: STUDENT IN AN ORGANIZED HEALTH CARE EDUCATION/TRAINING PROGRAM

## 2023-01-01 PROCEDURE — 999N000197 HC STATISTIC WOC PT EDUCATION, 0-15 MIN

## 2023-01-01 PROCEDURE — 87637 SARSCOV2&INF A&B&RSV AMP PRB: CPT | Performed by: NURSE PRACTITIONER

## 2023-01-01 PROCEDURE — 272N000103 HC INTRODUCER MICRO SET

## 2023-01-01 RX ORDER — SPIRONOLACTONE 25 MG/1
50 TABLET ORAL DAILY
Status: DISCONTINUED | OUTPATIENT
Start: 2023-01-01 | End: 2023-01-01 | Stop reason: HOSPADM

## 2023-01-01 RX ORDER — ACETAZOLAMIDE 500 MG/5ML
500 INJECTION, POWDER, LYOPHILIZED, FOR SOLUTION INTRAVENOUS ONCE
Status: COMPLETED | OUTPATIENT
Start: 2023-01-01 | End: 2023-01-01

## 2023-01-01 RX ORDER — SPIRONOLACTONE 50 MG/1
50 TABLET, FILM COATED ORAL DAILY
Qty: 90 TABLET | Refills: 3 | Status: ON HOLD | OUTPATIENT
Start: 2023-01-01 | End: 2023-01-01

## 2023-01-01 RX ORDER — NICOTINE POLACRILEX 4 MG
15-30 LOZENGE BUCCAL
Status: DISCONTINUED | OUTPATIENT
Start: 2023-01-01 | End: 2023-01-01

## 2023-01-01 RX ORDER — METOPROLOL SUCCINATE 50 MG/1
50 TABLET, EXTENDED RELEASE ORAL DAILY
Status: DISCONTINUED | OUTPATIENT
Start: 2023-01-01 | End: 2023-01-01 | Stop reason: HOSPADM

## 2023-01-01 RX ORDER — DILTIAZEM HYDROCHLORIDE 120 MG/1
120 CAPSULE, EXTENDED RELEASE ORAL 2 TIMES DAILY
Qty: 60 CAPSULE | Refills: 0 | Status: SHIPPED | OUTPATIENT
Start: 2023-01-01 | End: 2023-01-01

## 2023-01-01 RX ORDER — INSULIN ASPART 100 [IU]/ML
INJECTION, SOLUTION INTRAVENOUS; SUBCUTANEOUS
Qty: 330 ML | Refills: 0 | Status: SHIPPED | OUTPATIENT
Start: 2023-01-01 | End: 2023-01-01

## 2023-01-01 RX ORDER — SIMETHICONE 80 MG
80 TABLET,CHEWABLE ORAL EVERY 6 HOURS PRN
Status: DISCONTINUED | OUTPATIENT
Start: 2023-01-01 | End: 2023-01-01 | Stop reason: HOSPADM

## 2023-01-01 RX ORDER — LISINOPRIL 2.5 MG/1
2.5 TABLET ORAL DAILY
Status: DISCONTINUED | OUTPATIENT
Start: 2023-01-01 | End: 2023-01-01

## 2023-01-01 RX ORDER — METOPROLOL SUCCINATE 50 MG/1
50 TABLET, EXTENDED RELEASE ORAL DAILY
Status: DISCONTINUED | OUTPATIENT
Start: 2023-01-01 | End: 2023-01-01

## 2023-01-01 RX ORDER — DEXTROSE MONOHYDRATE 25 G/50ML
25 INJECTION, SOLUTION INTRAVENOUS ONCE
Status: COMPLETED | OUTPATIENT
Start: 2023-01-01 | End: 2023-01-01

## 2023-01-01 RX ORDER — MINERAL OIL/HYDROPHIL PETROLAT
OINTMENT (GRAM) TOPICAL
Status: DISCONTINUED | OUTPATIENT
Start: 2023-01-01 | End: 2023-01-01 | Stop reason: HOSPADM

## 2023-01-01 RX ORDER — DIPHENHYDRAMINE HCL 25 MG
25 CAPSULE ORAL ONCE
Status: DISCONTINUED | OUTPATIENT
Start: 2023-01-01 | End: 2023-01-01

## 2023-01-01 RX ORDER — INSULIN GLARGINE 300 U/ML
INJECTION, SOLUTION SUBCUTANEOUS
Qty: 40 ML | Refills: 11 | Status: SHIPPED | OUTPATIENT
Start: 2023-01-01 | End: 2023-01-01

## 2023-01-01 RX ORDER — NALOXONE HYDROCHLORIDE 0.4 MG/ML
0.4 INJECTION, SOLUTION INTRAMUSCULAR; INTRAVENOUS; SUBCUTANEOUS
Status: DISCONTINUED | OUTPATIENT
Start: 2023-01-01 | End: 2023-01-01 | Stop reason: HOSPADM

## 2023-01-01 RX ORDER — BUMETANIDE 2 MG/1
2 TABLET ORAL
Status: DISCONTINUED | OUTPATIENT
Start: 2023-01-01 | End: 2023-01-01

## 2023-01-01 RX ORDER — BUMETANIDE 1 MG/1
1 TABLET ORAL ONCE
Status: COMPLETED | OUTPATIENT
Start: 2023-01-01 | End: 2023-01-01

## 2023-01-01 RX ORDER — HEPARIN SODIUM,PORCINE 10 UNIT/ML
5-20 VIAL (ML) INTRAVENOUS EVERY 24 HOURS
Status: DISCONTINUED | OUTPATIENT
Start: 2023-01-01 | End: 2023-01-01 | Stop reason: ALTCHOICE

## 2023-01-01 RX ORDER — MENTHOL AND METHYL SALICYLATE 7.6; 29 G/100G; G/100G
OINTMENT TOPICAL EVERY 6 HOURS PRN
Status: DISCONTINUED | OUTPATIENT
Start: 2023-01-01 | End: 2023-01-01

## 2023-01-01 RX ORDER — MICONAZOLE NITRATE 20 MG/G
CREAM TOPICAL 2 TIMES DAILY
Status: DISCONTINUED | OUTPATIENT
Start: 2023-01-01 | End: 2023-01-01

## 2023-01-01 RX ORDER — DILTIAZEM HYDROCHLORIDE 60 MG/1
120 CAPSULE, EXTENDED RELEASE ORAL 2 TIMES DAILY
Status: DISCONTINUED | OUTPATIENT
Start: 2023-01-01 | End: 2023-01-01 | Stop reason: HOSPADM

## 2023-01-01 RX ORDER — METOPROLOL SUCCINATE 100 MG/1
100 TABLET, EXTENDED RELEASE ORAL DAILY
Status: DISCONTINUED | OUTPATIENT
Start: 2023-01-01 | End: 2023-01-01

## 2023-01-01 RX ORDER — MICONAZOLE NITRATE 20 MG/G
CREAM TOPICAL 2 TIMES DAILY
Status: DISCONTINUED | OUTPATIENT
Start: 2023-01-01 | End: 2023-01-01 | Stop reason: HOSPADM

## 2023-01-01 RX ORDER — BUMETANIDE 1 MG/1
1 TABLET ORAL DAILY PRN
Status: ON HOLD | DISCHARGE
Start: 2023-01-01 | End: 2023-01-01

## 2023-01-01 RX ORDER — DEXTROSE MONOHYDRATE 100 MG/ML
INJECTION, SOLUTION INTRAVENOUS CONTINUOUS
Status: DISCONTINUED | OUTPATIENT
Start: 2023-01-01 | End: 2023-01-01

## 2023-01-01 RX ORDER — CLOTRIMAZOLE 1 %
CREAM (GRAM) TOPICAL 2 TIMES DAILY PRN
Status: DISCONTINUED | OUTPATIENT
Start: 2023-01-01 | End: 2023-01-01 | Stop reason: HOSPADM

## 2023-01-01 RX ORDER — LABETALOL HYDROCHLORIDE 5 MG/ML
10 INJECTION, SOLUTION INTRAVENOUS EVERY 6 HOURS PRN
Status: DISCONTINUED | OUTPATIENT
Start: 2023-01-01 | End: 2023-01-01 | Stop reason: HOSPADM

## 2023-01-01 RX ORDER — OXYCODONE HYDROCHLORIDE 5 MG/1
5 TABLET ORAL EVERY 6 HOURS PRN
Qty: 30 TABLET | Refills: 0 | Status: SHIPPED | OUTPATIENT
Start: 2023-01-01 | End: 2023-01-01

## 2023-01-01 RX ORDER — TOPIRAMATE 50 MG/1
50 TABLET, FILM COATED ORAL DAILY
Status: DISCONTINUED | OUTPATIENT
Start: 2023-01-01 | End: 2023-01-01

## 2023-01-01 RX ORDER — DEXTROSE MONOHYDRATE 25 G/50ML
25-50 INJECTION, SOLUTION INTRAVENOUS
Status: DISCONTINUED | OUTPATIENT
Start: 2023-01-01 | End: 2023-01-01

## 2023-01-01 RX ORDER — NICOTINE 21 MG/24HR
1 PATCH, TRANSDERMAL 24 HOURS TRANSDERMAL DAILY
Status: DISCONTINUED | OUTPATIENT
Start: 2023-01-01 | End: 2023-01-01 | Stop reason: HOSPADM

## 2023-01-01 RX ORDER — HYDROMORPHONE HCL IN WATER/PF 6 MG/30 ML
0.2 PATIENT CONTROLLED ANALGESIA SYRINGE INTRAVENOUS EVERY 4 HOURS PRN
Status: DISCONTINUED | OUTPATIENT
Start: 2023-01-01 | End: 2023-01-01

## 2023-01-01 RX ORDER — MAGNESIUM OXIDE 400 MG/1
400 TABLET ORAL EVERY 4 HOURS
Status: COMPLETED | OUTPATIENT
Start: 2023-01-01 | End: 2023-01-01

## 2023-01-01 RX ORDER — POTASSIUM CHLORIDE 29.8 MG/ML
20 INJECTION INTRAVENOUS ONCE
Status: COMPLETED | OUTPATIENT
Start: 2023-01-01 | End: 2023-01-01

## 2023-01-01 RX ORDER — OXYCODONE HYDROCHLORIDE 5 MG/1
5 TABLET ORAL ONCE
Status: COMPLETED | OUTPATIENT
Start: 2023-01-01 | End: 2023-01-01

## 2023-01-01 RX ORDER — LOPERAMIDE HCL 2 MG
2 CAPSULE ORAL ONCE
Status: COMPLETED | OUTPATIENT
Start: 2023-01-01 | End: 2023-01-01

## 2023-01-01 RX ORDER — DEXTROSE MONOHYDRATE 25 G/50ML
25-50 INJECTION, SOLUTION INTRAVENOUS
Status: DISCONTINUED | OUTPATIENT
Start: 2023-01-01 | End: 2023-01-01 | Stop reason: HOSPADM

## 2023-01-01 RX ORDER — BUMETANIDE 0.25 MG/ML
5 INJECTION INTRAMUSCULAR; INTRAVENOUS
Status: DISCONTINUED | OUTPATIENT
Start: 2023-01-01 | End: 2023-01-01

## 2023-01-01 RX ORDER — LEVOTHYROXINE SODIUM 200 UG/1
200 TABLET ORAL
Qty: 24 TABLET | Refills: 0 | Status: SHIPPED | OUTPATIENT
Start: 2023-01-01 | End: 2023-01-01

## 2023-01-01 RX ORDER — INSULIN GLARGINE 300 U/ML
INJECTION, SOLUTION SUBCUTANEOUS
Qty: 40 ML | Refills: 11 | Status: ON HOLD | OUTPATIENT
Start: 2023-01-01 | End: 2023-01-01

## 2023-01-01 RX ORDER — CHLOROTHIAZIDE SODIUM 500 MG/1
1000 INJECTION INTRAVENOUS ONCE
Status: CANCELLED | OUTPATIENT
Start: 2023-01-01 | End: 2023-01-01

## 2023-01-01 RX ORDER — LIRAGLUTIDE 6 MG/ML
0.6 INJECTION SUBCUTANEOUS DAILY
Status: DISCONTINUED | OUTPATIENT
Start: 2023-01-01 | End: 2023-01-01

## 2023-01-01 RX ORDER — HYDRALAZINE HYDROCHLORIDE 25 MG/1
12.5 TABLET, FILM COATED ORAL 3 TIMES DAILY
Qty: 60 TABLET | Refills: 4 | Status: ON HOLD | OUTPATIENT
Start: 2023-01-01 | End: 2023-01-01

## 2023-01-01 RX ORDER — ISOSORBIDE DINITRATE 20 MG/1
20 TABLET ORAL
Status: DISCONTINUED | OUTPATIENT
Start: 2023-01-01 | End: 2023-01-01

## 2023-01-01 RX ORDER — SENNOSIDES A AND B 8.6 MG/1
1 TABLET, FILM COATED ORAL 2 TIMES DAILY
Status: DISCONTINUED | OUTPATIENT
Start: 2023-01-01 | End: 2023-01-01

## 2023-01-01 RX ORDER — NALOXONE HYDROCHLORIDE 0.4 MG/ML
0.2 INJECTION, SOLUTION INTRAMUSCULAR; INTRAVENOUS; SUBCUTANEOUS
Status: DISCONTINUED | OUTPATIENT
Start: 2023-01-01 | End: 2023-01-01 | Stop reason: HOSPADM

## 2023-01-01 RX ORDER — BUMETANIDE 1 MG/1
1 TABLET ORAL
Status: DISCONTINUED | OUTPATIENT
Start: 2023-01-01 | End: 2023-01-01

## 2023-01-01 RX ORDER — ACETAMINOPHEN 325 MG/1
650 TABLET ORAL 3 TIMES DAILY PRN
Status: DISCONTINUED | OUTPATIENT
Start: 2023-01-01 | End: 2023-01-01

## 2023-01-01 RX ORDER — LIRAGLUTIDE 6 MG/ML
1.2 INJECTION SUBCUTANEOUS DAILY
Status: DISCONTINUED | OUTPATIENT
Start: 2023-01-01 | End: 2023-01-01 | Stop reason: HOSPADM

## 2023-01-01 RX ORDER — CIPROFLOXACIN 500 MG/1
500 TABLET, FILM COATED ORAL EVERY 12 HOURS SCHEDULED
Status: COMPLETED | OUTPATIENT
Start: 2023-01-01 | End: 2023-01-01

## 2023-01-01 RX ORDER — CICLOPIROX 80 MG/ML
SOLUTION TOPICAL DAILY
Status: DISCONTINUED | OUTPATIENT
Start: 2023-01-01 | End: 2023-01-01

## 2023-01-01 RX ORDER — ACETAMINOPHEN 325 MG/1
650 TABLET ORAL EVERY 6 HOURS PRN
Status: DISCONTINUED | OUTPATIENT
Start: 2023-01-01 | End: 2023-01-01 | Stop reason: HOSPADM

## 2023-01-01 RX ORDER — OXYCODONE AND ACETAMINOPHEN 5; 325 MG/1; MG/1
1 TABLET ORAL EVERY 8 HOURS PRN
Status: DISCONTINUED | OUTPATIENT
Start: 2023-01-01 | End: 2023-01-01

## 2023-01-01 RX ORDER — GABAPENTIN 300 MG/1
600 CAPSULE ORAL 3 TIMES DAILY
Status: DISCONTINUED | OUTPATIENT
Start: 2023-01-01 | End: 2023-01-01 | Stop reason: HOSPADM

## 2023-01-01 RX ORDER — COLCHICINE 0.6 MG/1
.6-1.2 TABLET ORAL DAILY PRN
Status: DISCONTINUED | OUTPATIENT
Start: 2023-01-01 | End: 2023-01-01

## 2023-01-01 RX ORDER — SENNOSIDES 8.6 MG
8.6 TABLET ORAL 2 TIMES DAILY
Status: DISCONTINUED | OUTPATIENT
Start: 2023-01-01 | End: 2023-01-01 | Stop reason: HOSPADM

## 2023-01-01 RX ORDER — CAPSAICIN 0.025 %
CREAM (GRAM) TOPICAL 3 TIMES DAILY
Status: DISCONTINUED | OUTPATIENT
Start: 2023-01-01 | End: 2023-01-01

## 2023-01-01 RX ORDER — NICOTINE POLACRILEX 4 MG
15-30 LOZENGE BUCCAL
Status: DISCONTINUED | OUTPATIENT
Start: 2023-01-01 | End: 2023-01-01 | Stop reason: HOSPADM

## 2023-01-01 RX ORDER — LIDOCAINE 40 MG/G
CREAM TOPICAL
Status: CANCELLED | OUTPATIENT
Start: 2023-01-01

## 2023-01-01 RX ORDER — GABAPENTIN 300 MG/1
600 CAPSULE ORAL 2 TIMES DAILY
Status: DISCONTINUED | OUTPATIENT
Start: 2023-01-01 | End: 2023-01-01 | Stop reason: HOSPADM

## 2023-01-01 RX ORDER — METHOCARBAMOL 500 MG/1
500 TABLET, FILM COATED ORAL 4 TIMES DAILY PRN
Status: DISCONTINUED | OUTPATIENT
Start: 2023-01-01 | End: 2023-01-01 | Stop reason: HOSPADM

## 2023-01-01 RX ORDER — LIDOCAINE 4 G/G
2 PATCH TOPICAL
Status: DISCONTINUED | OUTPATIENT
Start: 2023-01-01 | End: 2023-01-01

## 2023-01-01 RX ORDER — BUMETANIDE 1 MG/1
1 TABLET ORAL DAILY
Status: DISCONTINUED | OUTPATIENT
Start: 2023-01-01 | End: 2023-01-01

## 2023-01-01 RX ORDER — LEVOTHYROXINE SODIUM 100 UG/1
200 TABLET ORAL
Status: DISCONTINUED | OUTPATIENT
Start: 2023-01-01 | End: 2023-01-01 | Stop reason: HOSPADM

## 2023-01-01 RX ORDER — FLASH GLUCOSE SENSOR
KIT MISCELLANEOUS
Qty: 2 EACH | Refills: 11 | Status: SHIPPED | OUTPATIENT
Start: 2023-01-01

## 2023-01-01 RX ORDER — NYSTATIN 100000 U/G
CREAM TOPICAL 2 TIMES DAILY
Status: DISCONTINUED | OUTPATIENT
Start: 2023-01-01 | End: 2023-01-01

## 2023-01-01 RX ORDER — HYDROMORPHONE HCL IN WATER/PF 6 MG/30 ML
PATIENT CONTROLLED ANALGESIA SYRINGE INTRAVENOUS
Status: COMPLETED
Start: 2023-01-01 | End: 2023-01-01

## 2023-01-01 RX ORDER — LEVOTHYROXINE SODIUM 200 UG/1
TABLET ORAL
Status: ON HOLD | COMMUNITY
End: 2023-01-01

## 2023-01-01 RX ORDER — HYDROXYZINE HYDROCHLORIDE 25 MG/1
25 TABLET, FILM COATED ORAL EVERY 6 HOURS PRN
Status: DISCONTINUED | OUTPATIENT
Start: 2023-01-01 | End: 2023-01-01 | Stop reason: HOSPADM

## 2023-01-01 RX ORDER — HYDRALAZINE HYDROCHLORIDE 25 MG/1
25 TABLET, FILM COATED ORAL ONCE
Status: COMPLETED | OUTPATIENT
Start: 2023-01-01 | End: 2023-01-01

## 2023-01-01 RX ORDER — ACETAMINOPHEN 325 MG/1
650 TABLET ORAL ONCE
Status: DISCONTINUED | OUTPATIENT
Start: 2023-01-01 | End: 2023-01-01

## 2023-01-01 RX ORDER — AMOXICILLIN 250 MG
1 CAPSULE ORAL AT BEDTIME
Status: DISCONTINUED | OUTPATIENT
Start: 2023-01-01 | End: 2023-01-01

## 2023-01-01 RX ORDER — METOPROLOL SUCCINATE 25 MG/1
25 TABLET, EXTENDED RELEASE ORAL DAILY
Status: DISCONTINUED | OUTPATIENT
Start: 2023-01-01 | End: 2023-01-01

## 2023-01-01 RX ORDER — BISACODYL 10 MG
10 SUPPOSITORY, RECTAL RECTAL ONCE
Status: COMPLETED | OUTPATIENT
Start: 2023-01-01 | End: 2023-01-01

## 2023-01-01 RX ORDER — TOPIRAMATE 25 MG/1
25 TABLET, FILM COATED ORAL DAILY
Status: DISCONTINUED | OUTPATIENT
Start: 2023-01-01 | End: 2023-01-01

## 2023-01-01 RX ORDER — HYDRALAZINE HYDROCHLORIDE 10 MG/1
10 TABLET, FILM COATED ORAL 3 TIMES DAILY
Status: DISCONTINUED | OUTPATIENT
Start: 2023-01-01 | End: 2023-01-01 | Stop reason: HOSPADM

## 2023-01-01 RX ORDER — GABAPENTIN 300 MG/1
600 CAPSULE ORAL 2 TIMES DAILY
Status: DISCONTINUED | OUTPATIENT
Start: 2023-01-01 | End: 2023-01-01

## 2023-01-01 RX ORDER — OXYCODONE HYDROCHLORIDE 5 MG/1
5 TABLET ORAL EVERY 6 HOURS PRN
Status: DISCONTINUED | OUTPATIENT
Start: 2023-01-01 | End: 2023-01-01 | Stop reason: HOSPADM

## 2023-01-01 RX ORDER — FLASH GLUCOSE SENSOR
KIT MISCELLANEOUS
Qty: 2 EACH | Refills: 11 | Status: SHIPPED | OUTPATIENT
Start: 2023-01-01 | End: 2023-01-01

## 2023-01-01 RX ORDER — DIPHENHYDRAMINE HYDROCHLORIDE AND LIDOCAINE HYDROCHLORIDE AND ALUMINUM HYDROXIDE AND MAGNESIUM HYDRO
10 KIT EVERY 6 HOURS PRN
Status: DISCONTINUED | OUTPATIENT
Start: 2023-01-01 | End: 2023-01-01 | Stop reason: HOSPADM

## 2023-01-01 RX ORDER — ISOSORBIDE DINITRATE 10 MG/1
20 TABLET ORAL
Status: DISCONTINUED | OUTPATIENT
Start: 2023-01-01 | End: 2023-01-01

## 2023-01-01 RX ORDER — LEVOTHYROXINE SODIUM 200 UG/1
400 TABLET ORAL WEEKLY
Status: DISCONTINUED | OUTPATIENT
Start: 2023-01-01 | End: 2023-01-01 | Stop reason: HOSPADM

## 2023-01-01 RX ORDER — INSULIN GLARGINE 300 U/ML
INJECTION, SOLUTION SUBCUTANEOUS
Qty: 225 ML | Refills: 11 | Status: SHIPPED | OUTPATIENT
Start: 2023-01-01 | End: 2023-01-01

## 2023-01-01 RX ORDER — PREDNISOLONE ACETATE 10 MG/ML
1 SUSPENSION/ DROPS OPHTHALMIC 3 TIMES DAILY
Status: DISCONTINUED | OUTPATIENT
Start: 2023-01-01 | End: 2023-01-01

## 2023-01-01 RX ORDER — CYCLOBENZAPRINE HCL 10 MG
10 TABLET ORAL EVERY 8 HOURS PRN
Status: DISCONTINUED | OUTPATIENT
Start: 2023-01-01 | End: 2023-01-01

## 2023-01-01 RX ORDER — ALBUTEROL SULFATE 90 UG/1
2 AEROSOL, METERED RESPIRATORY (INHALATION) EVERY 6 HOURS PRN
Status: DISCONTINUED | OUTPATIENT
Start: 2023-01-01 | End: 2023-01-01 | Stop reason: HOSPADM

## 2023-01-01 RX ORDER — ONDANSETRON 4 MG/1
4 TABLET, ORALLY DISINTEGRATING ORAL EVERY 6 HOURS PRN
Status: DISCONTINUED | OUTPATIENT
Start: 2023-01-01 | End: 2023-01-01 | Stop reason: HOSPADM

## 2023-01-01 RX ORDER — CICLOPIROX OLAMINE 7.7 MG/G
CREAM TOPICAL 2 TIMES DAILY
Qty: 90 G | Refills: 6 | Status: SHIPPED | OUTPATIENT
Start: 2023-01-01 | End: 2023-01-01

## 2023-01-01 RX ORDER — DILTIAZEM HYDROCHLORIDE 120 MG/1
120 CAPSULE, EXTENDED RELEASE ORAL 2 TIMES DAILY
Qty: 60 CAPSULE | Refills: 0 | Status: SHIPPED | OUTPATIENT
Start: 2023-01-01

## 2023-01-01 RX ORDER — SODIUM CHLORIDE 9 MG/ML
INJECTION, SOLUTION INTRAVENOUS CONTINUOUS
Status: CANCELLED | OUTPATIENT
Start: 2023-01-01 | End: 2023-01-01

## 2023-01-01 RX ORDER — DOCUSATE SODIUM 100 MG/1
100 CAPSULE, LIQUID FILLED ORAL DAILY PRN
Qty: 30 CAPSULE | Refills: 0 | Status: SHIPPED | OUTPATIENT
Start: 2023-01-01 | End: 2023-01-01

## 2023-01-01 RX ORDER — CALCIUM GLUCONATE 20 MG/ML
1 INJECTION, SOLUTION INTRAVENOUS ONCE
Status: COMPLETED | OUTPATIENT
Start: 2023-01-01 | End: 2023-01-01

## 2023-01-01 RX ORDER — LEVALBUTEROL INHALATION SOLUTION 0.31 MG/3ML
0.31 SOLUTION RESPIRATORY (INHALATION) ONCE
Status: COMPLETED | OUTPATIENT
Start: 2023-01-01 | End: 2023-01-01

## 2023-01-01 RX ORDER — CYCLOBENZAPRINE HCL 5 MG
10 TABLET ORAL ONCE
Status: COMPLETED | OUTPATIENT
Start: 2023-01-01 | End: 2023-01-01

## 2023-01-01 RX ORDER — CALCIUM GLUCONATE 94 MG/ML
1 INJECTION, SOLUTION INTRAVENOUS ONCE
Status: COMPLETED | OUTPATIENT
Start: 2023-01-01 | End: 2023-01-01

## 2023-01-01 RX ORDER — LEVOTHYROXINE SODIUM 200 UG/1
200 TABLET ORAL
Status: ON HOLD | DISCHARGE
Start: 2023-01-01 | End: 2023-01-01

## 2023-01-01 RX ORDER — GABAPENTIN 300 MG/1
600 CAPSULE ORAL 2 TIMES DAILY
Qty: 270 CAPSULE | Refills: 1 | Status: ON HOLD | DISCHARGE
Start: 2023-01-01 | End: 2023-01-01

## 2023-01-01 RX ORDER — HEPARIN SODIUM,PORCINE 10 UNIT/ML
5-20 VIAL (ML) INTRAVENOUS
Status: DISCONTINUED | OUTPATIENT
Start: 2023-01-01 | End: 2023-01-01 | Stop reason: HOSPADM

## 2023-01-01 RX ORDER — GABAPENTIN 300 MG/1
600 CAPSULE ORAL 3 TIMES DAILY
Qty: 180 CAPSULE | Refills: 0 | Status: SHIPPED | OUTPATIENT
Start: 2023-01-01 | End: 2023-01-01

## 2023-01-01 RX ORDER — METOLAZONE 5 MG/1
5 TABLET ORAL ONCE
Status: COMPLETED | OUTPATIENT
Start: 2023-01-01 | End: 2023-01-01

## 2023-01-01 RX ORDER — ATORVASTATIN CALCIUM 40 MG/1
40 TABLET, FILM COATED ORAL DAILY
Qty: 30 TABLET | Refills: 0 | Status: SHIPPED | OUTPATIENT
Start: 2023-01-01 | End: 2023-01-01

## 2023-01-01 RX ORDER — GABAPENTIN 300 MG/1
600 CAPSULE ORAL 3 TIMES DAILY
Status: DISCONTINUED | OUTPATIENT
Start: 2023-01-01 | End: 2023-01-01

## 2023-01-01 RX ORDER — CAPSAICIN 0.025 %
CREAM (GRAM) TOPICAL 3 TIMES DAILY PRN
Status: DISCONTINUED | OUTPATIENT
Start: 2023-01-01 | End: 2023-01-01 | Stop reason: HOSPADM

## 2023-01-01 RX ORDER — LIDOCAINE 40 MG/G
CREAM TOPICAL
Status: ACTIVE | OUTPATIENT
Start: 2023-01-01 | End: 2023-01-01

## 2023-01-01 RX ORDER — BUMETANIDE 0.25 MG/ML
4 INJECTION INTRAMUSCULAR; INTRAVENOUS ONCE
Status: COMPLETED | OUTPATIENT
Start: 2023-01-01 | End: 2023-01-01

## 2023-01-01 RX ORDER — DILTIAZEM HYDROCHLORIDE 180 MG/1
180 CAPSULE, COATED, EXTENDED RELEASE ORAL 2 TIMES DAILY
Status: DISCONTINUED | OUTPATIENT
Start: 2023-01-01 | End: 2023-01-01

## 2023-01-01 RX ORDER — NITROFURANTOIN 25; 75 MG/1; MG/1
100 CAPSULE ORAL EVERY 12 HOURS SCHEDULED
Status: COMPLETED | OUTPATIENT
Start: 2023-01-01 | End: 2023-01-01

## 2023-01-01 RX ORDER — CLOTRIMAZOLE 1 %
CREAM (GRAM) TOPICAL 2 TIMES DAILY PRN
Qty: 30 G | Refills: 0 | Status: SHIPPED | OUTPATIENT
Start: 2023-01-01 | End: 2023-01-01

## 2023-01-01 RX ORDER — INSULIN ASPART 100 [IU]/ML
INJECTION, SOLUTION INTRAVENOUS; SUBCUTANEOUS
Qty: 330 ML | Refills: 0 | Status: ON HOLD | OUTPATIENT
Start: 2023-01-01 | End: 2023-01-01

## 2023-01-01 RX ORDER — METOPROLOL SUCCINATE 25 MG/1
75 TABLET, EXTENDED RELEASE ORAL DAILY
Qty: 90 TABLET | Refills: 3 | Status: ON HOLD | OUTPATIENT
Start: 2023-01-01 | End: 2023-01-01

## 2023-01-01 RX ORDER — NITROFURANTOIN 25; 75 MG/1; MG/1
100 CAPSULE ORAL EVERY 12 HOURS SCHEDULED
Status: DISCONTINUED | OUTPATIENT
Start: 2023-01-01 | End: 2023-01-01

## 2023-01-01 RX ORDER — BUMETANIDE 0.25 MG/ML
5 INJECTION INTRAMUSCULAR; INTRAVENOUS ONCE
Status: COMPLETED | OUTPATIENT
Start: 2023-01-01 | End: 2023-01-01

## 2023-01-01 RX ORDER — ACETAZOLAMIDE 500 MG/5ML
500 INJECTION, POWDER, LYOPHILIZED, FOR SOLUTION INTRAVENOUS EVERY 12 HOURS
Status: DISCONTINUED | OUTPATIENT
Start: 2023-01-01 | End: 2023-01-01

## 2023-01-01 RX ORDER — BISACODYL 10 MG
10 SUPPOSITORY, RECTAL RECTAL DAILY PRN
Status: DISCONTINUED | OUTPATIENT
Start: 2023-01-01 | End: 2023-01-01 | Stop reason: HOSPADM

## 2023-01-01 RX ORDER — POLYETHYLENE GLYCOL 3350 17 G/17G
17 POWDER, FOR SOLUTION ORAL DAILY
Status: DISCONTINUED | OUTPATIENT
Start: 2023-01-01 | End: 2023-01-01 | Stop reason: HOSPADM

## 2023-01-01 RX ORDER — CICLOPIROX OLAMINE 7.7 MG/G
CREAM TOPICAL 2 TIMES DAILY
Qty: 90 G | Refills: 6 | Status: ON HOLD | OUTPATIENT
Start: 2023-01-01 | End: 2023-01-01

## 2023-01-01 RX ORDER — CAPSAICIN 0.025 %
1 CREAM (GRAM) TOPICAL 3 TIMES DAILY PRN
Status: DISCONTINUED | OUTPATIENT
Start: 2023-01-01 | End: 2023-01-01 | Stop reason: HOSPADM

## 2023-01-01 RX ORDER — DILTIAZEM HYDROCHLORIDE 120 MG/1
120 CAPSULE, COATED, EXTENDED RELEASE ORAL 2 TIMES DAILY
Status: DISCONTINUED | OUTPATIENT
Start: 2023-01-01 | End: 2023-01-01

## 2023-01-01 RX ORDER — BUMETANIDE 1 MG/1
1 TABLET ORAL DAILY
Status: COMPLETED | OUTPATIENT
Start: 2023-01-01 | End: 2023-01-01

## 2023-01-01 RX ORDER — ALBUTEROL SULFATE 0.83 MG/ML
2.5 SOLUTION RESPIRATORY (INHALATION) EVERY 6 HOURS PRN
Status: DISCONTINUED | OUTPATIENT
Start: 2023-01-01 | End: 2023-01-01 | Stop reason: HOSPADM

## 2023-01-01 RX ORDER — HYDROMORPHONE HCL IN WATER/PF 6 MG/30 ML
0.2 PATIENT CONTROLLED ANALGESIA SYRINGE INTRAVENOUS DAILY PRN
Status: DISCONTINUED | OUTPATIENT
Start: 2023-01-01 | End: 2023-01-01

## 2023-01-01 RX ORDER — BUMETANIDE 1 MG/1
1 TABLET ORAL DAILY
Status: DISCONTINUED | OUTPATIENT
Start: 2023-01-01 | End: 2023-01-01 | Stop reason: HOSPADM

## 2023-01-01 RX ORDER — COLCHICINE 0.6 MG/1
TABLET ORAL
Status: ON HOLD | COMMUNITY
End: 2023-01-01

## 2023-01-01 RX ORDER — CALCIUM GLUCONATE 94 MG/ML
1 INJECTION, SOLUTION INTRAVENOUS ONCE
Status: DISCONTINUED | OUTPATIENT
Start: 2023-01-01 | End: 2023-01-01

## 2023-01-01 RX ORDER — HYDRALAZINE HYDROCHLORIDE 25 MG/1
25 TABLET, FILM COATED ORAL EVERY 6 HOURS
Status: DISCONTINUED | OUTPATIENT
Start: 2023-01-01 | End: 2023-01-01

## 2023-01-01 RX ORDER — POTASSIUM CHLORIDE 1500 MG/1
80 TABLET, EXTENDED RELEASE ORAL 2 TIMES DAILY
Status: DISCONTINUED | OUTPATIENT
Start: 2023-01-01 | End: 2023-01-01

## 2023-01-01 RX ORDER — OXYCODONE AND ACETAMINOPHEN 5; 325 MG/1; MG/1
1 TABLET ORAL EVERY 8 HOURS PRN
Status: DISCONTINUED | OUTPATIENT
Start: 2023-01-01 | End: 2023-01-01 | Stop reason: HOSPADM

## 2023-01-01 RX ORDER — DEXTROSE 20 G/100ML
INJECTION, SOLUTION INTRAVENOUS CONTINUOUS
Status: DISCONTINUED | OUTPATIENT
Start: 2023-01-01 | End: 2023-01-01

## 2023-01-01 RX ORDER — DEXTROSE 50 G/100ML
INJECTION, SOLUTION INTRAVENOUS CONTINUOUS
Status: DISCONTINUED | OUTPATIENT
Start: 2023-01-01 | End: 2023-01-01

## 2023-01-01 RX ORDER — DILTIAZEM HYDROCHLORIDE 120 MG/1
120 CAPSULE, EXTENDED RELEASE ORAL 2 TIMES DAILY
Status: ON HOLD | DISCHARGE
Start: 2023-01-01 | End: 2023-01-01

## 2023-01-01 RX ORDER — BUMETANIDE 0.25 MG/ML
4 INJECTION INTRAMUSCULAR; INTRAVENOUS ONCE
Status: CANCELLED | OUTPATIENT
Start: 2023-01-01 | End: 2023-01-01

## 2023-01-01 RX ORDER — INSULIN ASPART 100 [IU]/ML
INJECTION, SOLUTION INTRAVENOUS; SUBCUTANEOUS
Qty: 30 ML | Refills: 0 | OUTPATIENT
Start: 2023-01-01

## 2023-01-01 RX ORDER — ATORVASTATIN CALCIUM 40 MG/1
40 TABLET, FILM COATED ORAL DAILY
Status: DISCONTINUED | OUTPATIENT
Start: 2023-01-01 | End: 2023-01-01 | Stop reason: HOSPADM

## 2023-01-01 RX ORDER — LEVOTHYROXINE SODIUM 200 UG/1
400 TABLET ORAL WEEKLY
Status: DISCONTINUED | OUTPATIENT
Start: 2023-01-01 | End: 2023-01-01

## 2023-01-01 RX ORDER — ASPIRIN 81 MG
100 TABLET, DELAYED RELEASE (ENTERIC COATED) ORAL DAILY PRN
Status: ON HOLD | COMMUNITY
End: 2023-01-01

## 2023-01-01 RX ORDER — BUMETANIDE 1 MG/1
1 TABLET ORAL DAILY PRN
Status: DISCONTINUED | OUTPATIENT
Start: 2023-01-01 | End: 2023-01-01

## 2023-01-01 RX ORDER — POLYETHYLENE GLYCOL 3350 17 G/17G
17 POWDER, FOR SOLUTION ORAL DAILY PRN
Status: DISCONTINUED | OUTPATIENT
Start: 2023-01-01 | End: 2023-01-01

## 2023-01-01 RX ORDER — SPIRONOLACTONE 50 MG/1
50 TABLET, FILM COATED ORAL DAILY
Qty: 90 TABLET | Refills: 3 | Status: SHIPPED | OUTPATIENT
Start: 2023-01-01 | End: 2023-01-01

## 2023-01-01 RX ORDER — HYDRALAZINE HYDROCHLORIDE 50 MG/1
50 TABLET, FILM COATED ORAL EVERY 6 HOURS
Status: DISCONTINUED | OUTPATIENT
Start: 2023-01-01 | End: 2023-01-01

## 2023-01-01 RX ORDER — METOPROLOL SUCCINATE 25 MG/1
50 TABLET, EXTENDED RELEASE ORAL DAILY
Status: DISCONTINUED | OUTPATIENT
Start: 2023-01-01 | End: 2023-01-01 | Stop reason: HOSPADM

## 2023-01-01 RX ORDER — SODIUM CHLORIDE 9 MG/ML
INJECTION, SOLUTION INTRAVENOUS CONTINUOUS
Status: CANCELLED | OUTPATIENT
Start: 2023-01-01

## 2023-01-01 RX ORDER — BUMETANIDE 1 MG/1
1 TABLET ORAL DAILY
Qty: 180 TABLET | Refills: 3 | Status: ON HOLD | DISCHARGE
Start: 2023-01-01 | End: 2023-01-01

## 2023-01-01 RX ORDER — LEVOTHYROXINE SODIUM 50 UG/1
200 TABLET ORAL
Status: DISCONTINUED | OUTPATIENT
Start: 2023-01-01 | End: 2023-01-01

## 2023-01-01 RX ORDER — NICOTINE 21 MG/24HR
1 PATCH, TRANSDERMAL 24 HOURS TRANSDERMAL DAILY
Status: ON HOLD | DISCHARGE
Start: 2023-01-01 | End: 2023-01-01

## 2023-01-01 RX ORDER — BUMETANIDE 0.25 MG/ML
2 INJECTION INTRAMUSCULAR; INTRAVENOUS ONCE
Status: COMPLETED | OUTPATIENT
Start: 2023-01-01 | End: 2023-01-01

## 2023-01-01 RX ORDER — METOPROLOL SUCCINATE 50 MG/1
50 TABLET, EXTENDED RELEASE ORAL DAILY
Qty: 30 TABLET | Refills: 0 | Status: SHIPPED | OUTPATIENT
Start: 2023-01-01 | End: 2023-01-01

## 2023-01-01 RX ORDER — LEVOTHYROXINE SODIUM 200 UG/1
400 TABLET ORAL WEEKLY
Status: ON HOLD | DISCHARGE
Start: 2023-01-01 | End: 2023-01-01

## 2023-01-01 RX ORDER — KETOROLAC TROMETHAMINE 5 MG/ML
1 SOLUTION OPHTHALMIC 3 TIMES DAILY
Status: DISCONTINUED | OUTPATIENT
Start: 2023-01-01 | End: 2023-01-01

## 2023-01-01 RX ORDER — PRENATAL VIT 91/IRON/FOLIC/DHA 28-975-200
COMBINATION PACKAGE (EA) ORAL DAILY
Status: DISCONTINUED | OUTPATIENT
Start: 2023-01-01 | End: 2023-01-01

## 2023-01-01 RX ORDER — BUMETANIDE 1 MG/1
1 TABLET ORAL
Status: DISCONTINUED | OUTPATIENT
Start: 2023-01-01 | End: 2023-01-01 | Stop reason: HOSPADM

## 2023-01-01 RX ORDER — HYDROXYZINE HYDROCHLORIDE 25 MG/1
50 TABLET, FILM COATED ORAL EVERY 6 HOURS PRN
Status: DISCONTINUED | OUTPATIENT
Start: 2023-01-01 | End: 2023-01-01 | Stop reason: HOSPADM

## 2023-01-01 RX ORDER — BUMETANIDE 1 MG/1
5 TABLET ORAL
Qty: 180 TABLET | Refills: 3 | Status: SHIPPED | OUTPATIENT
Start: 2023-01-01 | End: 2023-01-01

## 2023-01-01 RX ORDER — HYDROMORPHONE HCL IN WATER/PF 6 MG/30 ML
0.2 PATIENT CONTROLLED ANALGESIA SYRINGE INTRAVENOUS ONCE
Status: COMPLETED | OUTPATIENT
Start: 2023-01-01 | End: 2023-01-01

## 2023-01-01 RX ORDER — BUMETANIDE 1 MG/1
5 TABLET ORAL
Qty: 180 TABLET | Refills: 3 | Status: ON HOLD | OUTPATIENT
Start: 2023-01-01 | End: 2023-01-01

## 2023-01-01 RX ORDER — HYDRALAZINE HYDROCHLORIDE 10 MG/1
10 TABLET, FILM COATED ORAL 3 TIMES DAILY PRN
Status: ON HOLD | DISCHARGE
Start: 2023-01-01 | End: 2023-01-01

## 2023-01-01 RX ORDER — GABAPENTIN 300 MG/1
300 CAPSULE ORAL DAILY
Status: DISCONTINUED | OUTPATIENT
Start: 2023-01-01 | End: 2023-01-01

## 2023-01-01 RX ORDER — MAGNESIUM SULFATE HEPTAHYDRATE 40 MG/ML
4 INJECTION, SOLUTION INTRAVENOUS ONCE
Status: COMPLETED | OUTPATIENT
Start: 2023-01-01 | End: 2023-01-01

## 2023-01-01 RX ORDER — HYDRALAZINE HYDROCHLORIDE 10 MG/1
10 TABLET, FILM COATED ORAL 3 TIMES DAILY
Qty: 90 TABLET | Refills: 0 | Status: SHIPPED | OUTPATIENT
Start: 2023-01-01 | End: 2023-01-01

## 2023-01-01 RX ORDER — MAGNESIUM SULFATE HEPTAHYDRATE 40 MG/ML
2 INJECTION, SOLUTION INTRAVENOUS ONCE
Status: COMPLETED | OUTPATIENT
Start: 2023-01-01 | End: 2023-01-01

## 2023-01-01 RX ORDER — ACETAMINOPHEN 325 MG/1
650 TABLET ORAL EVERY 4 HOURS PRN
Status: DISCONTINUED | OUTPATIENT
Start: 2023-01-01 | End: 2023-01-01 | Stop reason: HOSPADM

## 2023-01-01 RX ORDER — HYDROXYZINE HYDROCHLORIDE 50 MG/1
50 TABLET, FILM COATED ORAL EVERY 6 HOURS PRN
Status: DISCONTINUED | OUTPATIENT
Start: 2023-01-01 | End: 2023-01-01 | Stop reason: HOSPADM

## 2023-01-01 RX ORDER — ISOSORBIDE DINITRATE 20 MG/1
20 TABLET ORAL 4 TIMES DAILY
Status: DISCONTINUED | OUTPATIENT
Start: 2023-01-01 | End: 2023-01-01

## 2023-01-01 RX ORDER — LEVOTHYROXINE SODIUM 200 UG/1
400 TABLET ORAL WEEKLY
Qty: 20 TABLET | Refills: 0 | Status: SHIPPED | OUTPATIENT
Start: 2023-01-01 | End: 2023-05-08

## 2023-01-01 RX ORDER — FLASH GLUCOSE SENSOR
1 KIT MISCELLANEOUS
Qty: 2 EACH | Refills: 3 | Status: SHIPPED | OUTPATIENT
Start: 2023-01-01

## 2023-01-01 RX ORDER — SODIUM CHLORIDE 9 MG/ML
INJECTION, SOLUTION INTRAVENOUS CONTINUOUS
Status: DISCONTINUED | OUTPATIENT
Start: 2023-01-01 | End: 2023-01-01

## 2023-01-01 RX ORDER — HYDRALAZINE HYDROCHLORIDE 10 MG/1
10 TABLET, FILM COATED ORAL 4 TIMES DAILY PRN
Status: DISCONTINUED | OUTPATIENT
Start: 2023-01-01 | End: 2023-01-01 | Stop reason: HOSPADM

## 2023-01-01 RX ORDER — BUMETANIDE 1 MG/1
1 TABLET ORAL
Qty: 60 TABLET | Refills: 0 | Status: SHIPPED | OUTPATIENT
Start: 2023-01-01 | End: 2023-01-01

## 2023-01-01 RX ORDER — HYDRALAZINE HYDROCHLORIDE 20 MG/ML
10 INJECTION INTRAMUSCULAR; INTRAVENOUS EVERY 6 HOURS PRN
Status: DISCONTINUED | OUTPATIENT
Start: 2023-01-01 | End: 2023-01-01

## 2023-01-01 RX ORDER — METOPROLOL SUCCINATE 50 MG/1
50 TABLET, EXTENDED RELEASE ORAL ONCE
Status: COMPLETED | OUTPATIENT
Start: 2023-01-01 | End: 2023-01-01

## 2023-01-01 RX ORDER — DOCUSATE SODIUM 100 MG/1
100 CAPSULE, LIQUID FILLED ORAL DAILY PRN
Status: DISCONTINUED | OUTPATIENT
Start: 2023-01-01 | End: 2023-01-01 | Stop reason: HOSPADM

## 2023-01-01 RX ORDER — METHOCARBAMOL 500 MG/1
500 TABLET, FILM COATED ORAL 4 TIMES DAILY PRN
Qty: 40 TABLET | Refills: 0 | Status: SHIPPED | OUTPATIENT
Start: 2023-01-01 | End: 2023-01-01

## 2023-01-01 RX ORDER — METOPROLOL SUCCINATE 50 MG/1
50 TABLET, EXTENDED RELEASE ORAL DAILY
Status: ON HOLD | DISCHARGE
Start: 2023-01-01 | End: 2023-01-01

## 2023-01-01 RX ORDER — OXYCODONE HYDROCHLORIDE 5 MG/1
5 TABLET ORAL
Status: ON HOLD | COMMUNITY
End: 2023-01-01

## 2023-01-01 RX ADMIN — SIMETHICONE 80 MG: 80 TABLET, CHEWABLE ORAL at 17:40

## 2023-01-01 RX ADMIN — INSULIN ASPART 5 UNITS: 100 INJECTION, SOLUTION INTRAVENOUS; SUBCUTANEOUS at 08:23

## 2023-01-01 RX ADMIN — MICONAZOLE NITRATE: 20 POWDER TOPICAL at 11:40

## 2023-01-01 RX ADMIN — GABAPENTIN 600 MG: 300 CAPSULE ORAL at 20:41

## 2023-01-01 RX ADMIN — Medication 1 LOZENGE: at 08:19

## 2023-01-01 RX ADMIN — POLYETHYLENE GLYCOL 3350 17 G: 17 POWDER, FOR SOLUTION ORAL at 07:55

## 2023-01-01 RX ADMIN — SENNOSIDES 8.6 MG: 8.6 TABLET ORAL at 20:27

## 2023-01-01 RX ADMIN — METHOCARBAMOL 500 MG: 500 TABLET ORAL at 15:39

## 2023-01-01 RX ADMIN — GABAPENTIN 600 MG: 300 CAPSULE ORAL at 20:47

## 2023-01-01 RX ADMIN — GABAPENTIN 600 MG: 300 CAPSULE ORAL at 19:43

## 2023-01-01 RX ADMIN — METHOCARBAMOL 500 MG: 500 TABLET ORAL at 08:41

## 2023-01-01 RX ADMIN — DILTIAZEM HYDROCHLORIDE 120 MG: 60 CAPSULE, EXTENDED RELEASE ORAL at 22:18

## 2023-01-01 RX ADMIN — METHOCARBAMOL 500 MG: 500 TABLET ORAL at 08:16

## 2023-01-01 RX ADMIN — NITROFURANTOIN (MONOHYDRATE/MACROCRYSTALS) 100 MG: 75; 25 CAPSULE ORAL at 19:35

## 2023-01-01 RX ADMIN — BUMETANIDE 3 MG: 1 TABLET ORAL at 16:04

## 2023-01-01 RX ADMIN — INSULIN GLARGINE 40 UNITS: 100 INJECTION, SOLUTION SUBCUTANEOUS at 00:10

## 2023-01-01 RX ADMIN — SENNOSIDES 8.6 MG: 8.6 TABLET, COATED ORAL at 07:50

## 2023-01-01 RX ADMIN — KETOROLAC TROMETHAMINE 1 DROP: 5 SOLUTION OPHTHALMIC at 13:11

## 2023-01-01 RX ADMIN — MICONAZOLE NITRATE: 20 POWDER TOPICAL at 09:32

## 2023-01-01 RX ADMIN — Medication 12.5 MG: at 09:11

## 2023-01-01 RX ADMIN — APIXABAN 5 MG: 5 TABLET, FILM COATED ORAL at 20:15

## 2023-01-01 RX ADMIN — INSULIN GLARGINE 65 UNITS: 100 INJECTION, SOLUTION SUBCUTANEOUS at 07:44

## 2023-01-01 RX ADMIN — OXYCODONE HYDROCHLORIDE AND ACETAMINOPHEN 1 TABLET: 5; 325 TABLET ORAL at 09:13

## 2023-01-01 RX ADMIN — INSULIN ASPART 3 UNITS: 100 INJECTION, SOLUTION INTRAVENOUS; SUBCUTANEOUS at 16:46

## 2023-01-01 RX ADMIN — SPIRONOLACTONE 50 MG: 50 TABLET ORAL at 08:21

## 2023-01-01 RX ADMIN — HYDRALAZINE HYDROCHLORIDE 10 MG: 10 TABLET ORAL at 21:30

## 2023-01-01 RX ADMIN — NICOTINE POLACRILEX 2 MG: 2 GUM, CHEWING BUCCAL at 08:20

## 2023-01-01 RX ADMIN — ATORVASTATIN CALCIUM 40 MG: 40 TABLET, FILM COATED ORAL at 09:02

## 2023-01-01 RX ADMIN — DOCUSATE SODIUM 50 MG: 50 CAPSULE, LIQUID FILLED ORAL at 20:47

## 2023-01-01 RX ADMIN — HYDRALAZINE HYDROCHLORIDE 10 MG: 10 TABLET ORAL at 21:32

## 2023-01-01 RX ADMIN — METHOCARBAMOL 500 MG: 500 TABLET ORAL at 17:07

## 2023-01-01 RX ADMIN — CIPROFLOXACIN HYDROCHLORIDE 500 MG: 500 TABLET, FILM COATED ORAL at 08:20

## 2023-01-01 RX ADMIN — GABAPENTIN 600 MG: 300 CAPSULE ORAL at 08:30

## 2023-01-01 RX ADMIN — ATORVASTATIN CALCIUM 40 MG: 40 TABLET, FILM COATED ORAL at 08:32

## 2023-01-01 RX ADMIN — HYDRALAZINE HYDROCHLORIDE 10 MG: 10 TABLET, FILM COATED ORAL at 13:53

## 2023-01-01 RX ADMIN — MICONAZOLE NITRATE: 20 CREAM TOPICAL at 20:47

## 2023-01-01 RX ADMIN — CYCLOBENZAPRINE 10 MG: 10 TABLET, FILM COATED ORAL at 14:07

## 2023-01-01 RX ADMIN — MAGNESIUM OXIDE TAB 400 MG (241.3 MG ELEMENTAL MG) 400 MG: 400 (241.3 MG) TAB at 00:27

## 2023-01-01 RX ADMIN — LIRAGLUTIDE 1.2 MG: 6 INJECTION SUBCUTANEOUS at 09:33

## 2023-01-01 RX ADMIN — OXYCODONE HYDROCHLORIDE AND ACETAMINOPHEN 1 TABLET: 5; 325 TABLET ORAL at 20:24

## 2023-01-01 RX ADMIN — INSULIN ASPART 4 UNITS: 100 INJECTION, SOLUTION INTRAVENOUS; SUBCUTANEOUS at 16:22

## 2023-01-01 RX ADMIN — ISOSORBIDE DINITRATE 20 MG: 20 TABLET ORAL at 08:05

## 2023-01-01 RX ADMIN — ACETAMINOPHEN 650 MG: 325 TABLET, FILM COATED ORAL at 13:59

## 2023-01-01 RX ADMIN — INSULIN ASPART 2 UNITS: 100 INJECTION, SOLUTION INTRAVENOUS; SUBCUTANEOUS at 12:57

## 2023-01-01 RX ADMIN — UMECLIDINIUM 1 PUFF: 62.5 AEROSOL, POWDER ORAL at 07:52

## 2023-01-01 RX ADMIN — METHOCARBAMOL 500 MG: 500 TABLET ORAL at 20:42

## 2023-01-01 RX ADMIN — METHOCARBAMOL 500 MG: 500 TABLET ORAL at 19:35

## 2023-01-01 RX ADMIN — MAGNESIUM OXIDE TAB 400 MG (240 MG ELEMENTAL MG) 400 MG: 400 (240 MG) TAB at 14:16

## 2023-01-01 RX ADMIN — Medication 1 LOZENGE: at 12:06

## 2023-01-01 RX ADMIN — LEVOTHYROXINE SODIUM 200 MCG: 0.03 TABLET ORAL at 12:01

## 2023-01-01 RX ADMIN — INSULIN ASPART 4 UNITS: 100 INJECTION, SOLUTION INTRAVENOUS; SUBCUTANEOUS at 16:29

## 2023-01-01 RX ADMIN — NICOTINE POLACRILEX 2 MG: 2 GUM, CHEWING BUCCAL at 16:19

## 2023-01-01 RX ADMIN — EMPAGLIFLOZIN 25 MG: 25 TABLET, FILM COATED ORAL at 08:04

## 2023-01-01 RX ADMIN — HYDRALAZINE HYDROCHLORIDE 10 MG: 10 TABLET ORAL at 13:02

## 2023-01-01 RX ADMIN — TOPIRAMATE 25 MG: 25 TABLET, FILM COATED ORAL at 07:58

## 2023-01-01 RX ADMIN — Medication 1 LOZENGE: at 16:06

## 2023-01-01 RX ADMIN — SIMETHICONE 80 MG: 80 TABLET, CHEWABLE ORAL at 08:56

## 2023-01-01 RX ADMIN — LIDOCAINE 2 PATCH: 560 PATCH PERCUTANEOUS; TOPICAL; TRANSDERMAL at 12:47

## 2023-01-01 RX ADMIN — METHOCARBAMOL 500 MG: 500 TABLET ORAL at 08:08

## 2023-01-01 RX ADMIN — BUMETANIDE 3 MG: 1 TABLET ORAL at 09:02

## 2023-01-01 RX ADMIN — METHOCARBAMOL 500 MG: 500 TABLET ORAL at 20:46

## 2023-01-01 RX ADMIN — DEXTROSE: 20 INJECTION, SOLUTION INTRAVENOUS at 02:10

## 2023-01-01 RX ADMIN — APIXABAN 5 MG: 5 TABLET, FILM COATED ORAL at 20:27

## 2023-01-01 RX ADMIN — BUMETANIDE 3 MG: 1 TABLET ORAL at 07:47

## 2023-01-01 RX ADMIN — INSULIN GLARGINE 65 UNITS: 100 INJECTION, SOLUTION SUBCUTANEOUS at 09:10

## 2023-01-01 RX ADMIN — KETOROLAC TROMETHAMINE 1 DROP: 5 SOLUTION OPHTHALMIC at 14:24

## 2023-01-01 RX ADMIN — ACETAMINOPHEN 650 MG: 325 TABLET, FILM COATED ORAL at 17:36

## 2023-01-01 RX ADMIN — BUMETANIDE 1 MG: 1 TABLET ORAL at 16:47

## 2023-01-01 RX ADMIN — ALBUTEROL SULFATE 2.5 MG: 2.5 SOLUTION RESPIRATORY (INHALATION) at 19:56

## 2023-01-01 RX ADMIN — ATORVASTATIN CALCIUM 40 MG: 40 TABLET, FILM COATED ORAL at 08:21

## 2023-01-01 RX ADMIN — BUMETANIDE 1 MG: 1 TABLET ORAL at 08:46

## 2023-01-01 RX ADMIN — LEVOTHYROXINE SODIUM 200 MCG: 0.03 TABLET ORAL at 11:14

## 2023-01-01 RX ADMIN — OXYCODONE HYDROCHLORIDE AND ACETAMINOPHEN 1 TABLET: 5; 325 TABLET ORAL at 21:39

## 2023-01-01 RX ADMIN — GABAPENTIN 600 MG: 300 CAPSULE ORAL at 08:07

## 2023-01-01 RX ADMIN — ATORVASTATIN CALCIUM 40 MG: 40 TABLET, FILM COATED ORAL at 07:58

## 2023-01-01 RX ADMIN — GABAPENTIN 600 MG: 300 CAPSULE ORAL at 14:56

## 2023-01-01 RX ADMIN — HYDROXYZINE HYDROCHLORIDE 50 MG: 50 TABLET, FILM COATED ORAL at 21:12

## 2023-01-01 RX ADMIN — SPIRONOLACTONE 50 MG: 50 TABLET ORAL at 07:38

## 2023-01-01 RX ADMIN — ACETAMINOPHEN 325MG 650 MG: 325 TABLET ORAL at 08:17

## 2023-01-01 RX ADMIN — ACETAMINOPHEN 650 MG: 325 TABLET, FILM COATED ORAL at 03:35

## 2023-01-01 RX ADMIN — APIXABAN 5 MG: 5 TABLET, FILM COATED ORAL at 20:44

## 2023-01-01 RX ADMIN — ATORVASTATIN CALCIUM 40 MG: 40 TABLET, FILM COATED ORAL at 08:16

## 2023-01-01 RX ADMIN — INSULIN ASPART 6 UNITS: 100 INJECTION, SOLUTION INTRAVENOUS; SUBCUTANEOUS at 09:17

## 2023-01-01 RX ADMIN — UMECLIDINIUM 1 PUFF: 62.5 AEROSOL, POWDER ORAL at 07:53

## 2023-01-01 RX ADMIN — INSULIN ASPART 2 UNITS: 100 INJECTION, SOLUTION INTRAVENOUS; SUBCUTANEOUS at 13:13

## 2023-01-01 RX ADMIN — DILTIAZEM HYDROCHLORIDE 120 MG: 60 CAPSULE, EXTENDED RELEASE ORAL at 08:40

## 2023-01-01 RX ADMIN — ATORVASTATIN CALCIUM 40 MG: 40 TABLET, FILM COATED ORAL at 09:22

## 2023-01-01 RX ADMIN — ACETAMINOPHEN 650 MG: 325 TABLET, FILM COATED ORAL at 09:00

## 2023-01-01 RX ADMIN — METHOCARBAMOL 500 MG: 500 TABLET ORAL at 08:04

## 2023-01-01 RX ADMIN — SODIUM CHLORIDE, PRESERVATIVE FREE 5 ML: 5 INJECTION INTRAVENOUS at 00:16

## 2023-01-01 RX ADMIN — OXYCODONE HYDROCHLORIDE AND ACETAMINOPHEN 1 TABLET: 5; 325 TABLET ORAL at 08:12

## 2023-01-01 RX ADMIN — Medication 1 LOZENGE: at 13:51

## 2023-01-01 RX ADMIN — DILTIAZEM HYDROCHLORIDE 120 MG: 60 CAPSULE, EXTENDED RELEASE ORAL at 08:46

## 2023-01-01 RX ADMIN — CYCLOBENZAPRINE HYDROCHLORIDE 10 MG: 5 TABLET, FILM COATED ORAL at 12:47

## 2023-01-01 RX ADMIN — SENNOSIDES 8.6 MG: 8.6 TABLET, COATED ORAL at 07:38

## 2023-01-01 RX ADMIN — NICOTINE 1 PATCH: 14 PATCH, EXTENDED RELEASE TRANSDERMAL at 22:40

## 2023-01-01 RX ADMIN — NICOTINE POLACRILEX 2 MG: 2 GUM, CHEWING BUCCAL at 13:52

## 2023-01-01 RX ADMIN — DILTIAZEM HYDROCHLORIDE 120 MG: 60 CAPSULE, EXTENDED RELEASE ORAL at 08:22

## 2023-01-01 RX ADMIN — OXYCODONE HYDROCHLORIDE AND ACETAMINOPHEN 1 TABLET: 5; 325 TABLET ORAL at 16:58

## 2023-01-01 RX ADMIN — METOPROLOL SUCCINATE 50 MG: 50 TABLET, EXTENDED RELEASE ORAL at 11:23

## 2023-01-01 RX ADMIN — ISOSORBIDE DINITRATE 20 MG: 20 TABLET ORAL at 17:09

## 2023-01-01 RX ADMIN — Medication 1 LOZENGE: at 08:51

## 2023-01-01 RX ADMIN — ATORVASTATIN CALCIUM 40 MG: 40 TABLET, FILM COATED ORAL at 07:38

## 2023-01-01 RX ADMIN — TOPIRAMATE 25 MG: 25 TABLET, FILM COATED ORAL at 08:49

## 2023-01-01 RX ADMIN — Medication 1 LOZENGE: at 00:26

## 2023-01-01 RX ADMIN — ACETAMINOPHEN 325MG 650 MG: 325 TABLET ORAL at 09:16

## 2023-01-01 RX ADMIN — HYDRALAZINE HYDROCHLORIDE 10 MG: 10 TABLET ORAL at 08:50

## 2023-01-01 RX ADMIN — OXYCODONE HYDROCHLORIDE 5 MG: 5 TABLET ORAL at 13:44

## 2023-01-01 RX ADMIN — SENNOSIDES 8.6 MG: 8.6 TABLET, COATED ORAL at 20:44

## 2023-01-01 RX ADMIN — APIXABAN 5 MG: 5 TABLET, FILM COATED ORAL at 19:35

## 2023-01-01 RX ADMIN — SPIRONOLACTONE 50 MG: 50 TABLET ORAL at 08:04

## 2023-01-01 RX ADMIN — MICONAZOLE NITRATE: 20 POWDER TOPICAL at 09:43

## 2023-01-01 RX ADMIN — APIXABAN 5 MG: 5 TABLET, FILM COATED ORAL at 09:00

## 2023-01-01 RX ADMIN — ACETAMINOPHEN 325MG 650 MG: 325 TABLET ORAL at 15:28

## 2023-01-01 RX ADMIN — METHOCARBAMOL 500 MG: 500 TABLET ORAL at 23:56

## 2023-01-01 RX ADMIN — HYDRALAZINE HYDROCHLORIDE 10 MG: 10 TABLET ORAL at 15:42

## 2023-01-01 RX ADMIN — SENNOSIDES 8.6 MG: 8.6 TABLET, COATED ORAL at 20:37

## 2023-01-01 RX ADMIN — INSULIN ASPART 4 UNITS: 100 INJECTION, SOLUTION INTRAVENOUS; SUBCUTANEOUS at 18:13

## 2023-01-01 RX ADMIN — INSULIN ASPART 4 UNITS: 100 INJECTION, SOLUTION INTRAVENOUS; SUBCUTANEOUS at 09:04

## 2023-01-01 RX ADMIN — APIXABAN 5 MG: 5 TABLET, FILM COATED ORAL at 20:09

## 2023-01-01 RX ADMIN — ATORVASTATIN CALCIUM 40 MG: 40 TABLET, FILM COATED ORAL at 08:41

## 2023-01-01 RX ADMIN — SIMETHICONE 80 MG: 80 TABLET, CHEWABLE ORAL at 08:18

## 2023-01-01 RX ADMIN — OXYCODONE HYDROCHLORIDE AND ACETAMINOPHEN 1 TABLET: 5; 325 TABLET ORAL at 08:18

## 2023-01-01 RX ADMIN — MICONAZOLE NITRATE: 20 CREAM TOPICAL at 08:08

## 2023-01-01 RX ADMIN — INSULIN ASPART 14 UNITS: 100 INJECTION, SOLUTION INTRAVENOUS; SUBCUTANEOUS at 11:42

## 2023-01-01 RX ADMIN — HYDRALAZINE HYDROCHLORIDE 10 MG: 10 TABLET ORAL at 08:27

## 2023-01-01 RX ADMIN — NICOTINE 1 PATCH: 14 PATCH, EXTENDED RELEASE TRANSDERMAL at 00:16

## 2023-01-01 RX ADMIN — POLYETHYLENE GLYCOL 3350 17 G: 17 POWDER, FOR SOLUTION ORAL at 08:17

## 2023-01-01 RX ADMIN — GABAPENTIN 600 MG: 300 CAPSULE ORAL at 08:16

## 2023-01-01 RX ADMIN — NYSTATIN: 100000 CREAM TOPICAL at 00:17

## 2023-01-01 RX ADMIN — DILTIAZEM HYDROCHLORIDE 120 MG: 60 CAPSULE, EXTENDED RELEASE ORAL at 08:25

## 2023-01-01 RX ADMIN — METHOCARBAMOL 500 MG: 500 TABLET ORAL at 13:39

## 2023-01-01 RX ADMIN — DILTIAZEM HYDROCHLORIDE 120 MG: 120 CAPSULE, COATED, EXTENDED RELEASE ORAL at 19:41

## 2023-01-01 RX ADMIN — METHOCARBAMOL 500 MG: 500 TABLET ORAL at 09:06

## 2023-01-01 RX ADMIN — HYDRALAZINE HYDROCHLORIDE 10 MG: 10 TABLET, FILM COATED ORAL at 14:06

## 2023-01-01 RX ADMIN — OXYCODONE HYDROCHLORIDE AND ACETAMINOPHEN 1 TABLET: 5; 325 TABLET ORAL at 23:50

## 2023-01-01 RX ADMIN — GABAPENTIN 600 MG: 300 CAPSULE ORAL at 21:11

## 2023-01-01 RX ADMIN — GABAPENTIN 600 MG: 300 CAPSULE ORAL at 07:55

## 2023-01-01 RX ADMIN — TERBINAFINE HYDROCHLORIDE: 1 CREAM TOPICAL at 07:58

## 2023-01-01 RX ADMIN — INSULIN ASPART 15 UNITS: 100 INJECTION, SOLUTION INTRAVENOUS; SUBCUTANEOUS at 13:25

## 2023-01-01 RX ADMIN — BUMETANIDE 2 MG/HR: 0.25 INJECTION, SOLUTION INTRAMUSCULAR; INTRAVENOUS at 06:50

## 2023-01-01 RX ADMIN — OXYCODONE HYDROCHLORIDE AND ACETAMINOPHEN 1 TABLET: 5; 325 TABLET ORAL at 07:48

## 2023-01-01 RX ADMIN — POTASSIUM CHLORIDE 20 MEQ: 29.8 INJECTION, SOLUTION INTRAVENOUS at 08:00

## 2023-01-01 RX ADMIN — OXYCODONE HYDROCHLORIDE 5 MG: 5 TABLET ORAL at 05:01

## 2023-01-01 RX ADMIN — BUMETANIDE 5 MG: 0.25 INJECTION INTRAMUSCULAR; INTRAVENOUS at 11:00

## 2023-01-01 RX ADMIN — ATORVASTATIN CALCIUM 40 MG: 40 TABLET, FILM COATED ORAL at 07:47

## 2023-01-01 RX ADMIN — HYDROMORPHONE HYDROCHLORIDE 0.2 MG: 0.2 INJECTION, SOLUTION INTRAMUSCULAR; INTRAVENOUS; SUBCUTANEOUS at 08:49

## 2023-01-01 RX ADMIN — INSULIN ASPART 15 UNITS: 100 INJECTION, SOLUTION INTRAVENOUS; SUBCUTANEOUS at 09:09

## 2023-01-01 RX ADMIN — OXYCODONE HYDROCHLORIDE AND ACETAMINOPHEN 1 TABLET: 5; 325 TABLET ORAL at 22:22

## 2023-01-01 RX ADMIN — TERBINAFINE HYDROCHLORIDE: 1 CREAM TOPICAL at 07:51

## 2023-01-01 RX ADMIN — ACETAMINOPHEN 650 MG: 325 TABLET, FILM COATED ORAL at 19:40

## 2023-01-01 RX ADMIN — Medication 1 LOZENGE: at 07:52

## 2023-01-01 RX ADMIN — MICONAZOLE NITRATE: 20 CREAM TOPICAL at 07:58

## 2023-01-01 RX ADMIN — HYDROMORPHONE HYDROCHLORIDE 0.2 MG: 0.2 INJECTION, SOLUTION INTRAMUSCULAR; INTRAVENOUS; SUBCUTANEOUS at 00:34

## 2023-01-01 RX ADMIN — INSULIN ASPART 7 UNITS: 100 INJECTION, SOLUTION INTRAVENOUS; SUBCUTANEOUS at 09:03

## 2023-01-01 RX ADMIN — METHOCARBAMOL 500 MG: 500 TABLET ORAL at 15:44

## 2023-01-01 RX ADMIN — UMECLIDINIUM 1 PUFF: 62.5 AEROSOL, POWDER ORAL at 08:42

## 2023-01-01 RX ADMIN — INSULIN ASPART 16 UNITS: 100 INJECTION, SOLUTION INTRAVENOUS; SUBCUTANEOUS at 08:22

## 2023-01-01 RX ADMIN — INSULIN ASPART 14 UNITS: 100 INJECTION, SOLUTION INTRAVENOUS; SUBCUTANEOUS at 12:58

## 2023-01-01 RX ADMIN — CAPSAICIN: 0.25 CREAM TOPICAL at 14:16

## 2023-01-01 RX ADMIN — BUMETANIDE 3 MG: 1 TABLET ORAL at 07:38

## 2023-01-01 RX ADMIN — APIXABAN 5 MG: 5 TABLET, FILM COATED ORAL at 20:47

## 2023-01-01 RX ADMIN — Medication 1 LOZENGE: at 12:09

## 2023-01-01 RX ADMIN — Medication 12.5 MG: at 13:13

## 2023-01-01 RX ADMIN — METOPROLOL SUCCINATE 50 MG: 25 TABLET, EXTENDED RELEASE ORAL at 08:42

## 2023-01-01 RX ADMIN — INSULIN ASPART 15 UNITS: 100 INJECTION, SOLUTION INTRAVENOUS; SUBCUTANEOUS at 13:03

## 2023-01-01 RX ADMIN — INSULIN ASPART 5 UNITS: 100 INJECTION, SOLUTION INTRAVENOUS; SUBCUTANEOUS at 08:01

## 2023-01-01 RX ADMIN — LIRAGLUTIDE 0.6 MG: 6 INJECTION SUBCUTANEOUS at 09:16

## 2023-01-01 RX ADMIN — OXYCODONE HYDROCHLORIDE AND ACETAMINOPHEN 1 TABLET: 5; 325 TABLET ORAL at 08:19

## 2023-01-01 RX ADMIN — SPIRONOLACTONE 50 MG: 50 TABLET ORAL at 08:08

## 2023-01-01 RX ADMIN — DEXTROSE MONOHYDRATE 50 ML: 25 INJECTION, SOLUTION INTRAVENOUS at 11:54

## 2023-01-01 RX ADMIN — NICOTINE 1 PATCH: 14 PATCH, EXTENDED RELEASE TRANSDERMAL at 07:46

## 2023-01-01 RX ADMIN — MICONAZOLE NITRATE: 20 CREAM TOPICAL at 07:46

## 2023-01-01 RX ADMIN — NICOTINE 1 PATCH: 14 PATCH, EXTENDED RELEASE TRANSDERMAL at 08:58

## 2023-01-01 RX ADMIN — LEVOTHYROXINE SODIUM 400 MCG: 0.2 TABLET ORAL at 09:42

## 2023-01-01 RX ADMIN — LEVOTHYROXINE SODIUM 400 MCG: 0.15 TABLET ORAL at 08:08

## 2023-01-01 RX ADMIN — NICOTINE POLACRILEX 2 MG: 2 GUM, CHEWING BUCCAL at 08:31

## 2023-01-01 RX ADMIN — GABAPENTIN 600 MG: 300 CAPSULE ORAL at 14:02

## 2023-01-01 RX ADMIN — OXYCODONE HYDROCHLORIDE AND ACETAMINOPHEN 1 TABLET: 5; 325 TABLET ORAL at 07:52

## 2023-01-01 RX ADMIN — DILTIAZEM HYDROCHLORIDE 120 MG: 60 CAPSULE, EXTENDED RELEASE ORAL at 20:00

## 2023-01-01 RX ADMIN — Medication 1 LOZENGE: at 08:18

## 2023-01-01 RX ADMIN — MICONAZOLE NITRATE: 20 POWDER TOPICAL at 08:10

## 2023-01-01 RX ADMIN — HYDROXYZINE HYDROCHLORIDE 50 MG: 25 TABLET, FILM COATED ORAL at 23:33

## 2023-01-01 RX ADMIN — ACETAMINOPHEN 325MG 650 MG: 325 TABLET ORAL at 06:00

## 2023-01-01 RX ADMIN — INSULIN GLARGINE 65 UNITS: 300 INJECTION, SOLUTION SUBCUTANEOUS at 09:13

## 2023-01-01 RX ADMIN — BUMETANIDE 5 MG: 1 TABLET ORAL at 16:45

## 2023-01-01 RX ADMIN — INSULIN ASPART 3 UNITS: 100 INJECTION, SOLUTION INTRAVENOUS; SUBCUTANEOUS at 09:29

## 2023-01-01 RX ADMIN — ATORVASTATIN CALCIUM 40 MG: 40 TABLET, FILM COATED ORAL at 09:06

## 2023-01-01 RX ADMIN — MAGNESIUM SULFATE HEPTAHYDRATE 2 G: 40 INJECTION, SOLUTION INTRAVENOUS at 05:59

## 2023-01-01 RX ADMIN — INSULIN ASPART 1 UNITS: 100 INJECTION, SOLUTION INTRAVENOUS; SUBCUTANEOUS at 13:13

## 2023-01-01 RX ADMIN — ATORVASTATIN CALCIUM 40 MG: 40 TABLET, FILM COATED ORAL at 09:00

## 2023-01-01 RX ADMIN — METOPROLOL SUCCINATE 50 MG: 25 TABLET, EXTENDED RELEASE ORAL at 09:06

## 2023-01-01 RX ADMIN — DEXTROSE MONOHYDRATE 25 ML: 25 INJECTION, SOLUTION INTRAVENOUS at 05:19

## 2023-01-01 RX ADMIN — SIMETHICONE 80 MG: 80 TABLET, CHEWABLE ORAL at 17:07

## 2023-01-01 RX ADMIN — Medication 1 LOZENGE: at 10:29

## 2023-01-01 RX ADMIN — OXYCODONE HYDROCHLORIDE 5 MG: 5 TABLET ORAL at 18:10

## 2023-01-01 RX ADMIN — Medication: at 12:50

## 2023-01-01 RX ADMIN — GABAPENTIN 600 MG: 300 CAPSULE ORAL at 20:26

## 2023-01-01 RX ADMIN — SIMETHICONE 80 MG: 80 TABLET, CHEWABLE ORAL at 13:14

## 2023-01-01 RX ADMIN — INSULIN ASPART 1 UNITS: 100 INJECTION, SOLUTION INTRAVENOUS; SUBCUTANEOUS at 17:30

## 2023-01-01 RX ADMIN — MICONAZOLE NITRATE: 20 CREAM TOPICAL at 00:17

## 2023-01-01 RX ADMIN — SENNOSIDES 8.6 MG: 8.6 TABLET, COATED ORAL at 09:02

## 2023-01-01 RX ADMIN — LEVOTHYROXINE SODIUM 200 MCG: 0.03 TABLET ORAL at 09:10

## 2023-01-01 RX ADMIN — SPIRONOLACTONE 50 MG: 25 TABLET, FILM COATED ORAL at 08:05

## 2023-01-01 RX ADMIN — ATORVASTATIN CALCIUM 40 MG: 40 TABLET, FILM COATED ORAL at 08:07

## 2023-01-01 RX ADMIN — INSULIN ASPART 5 UNITS: 100 INJECTION, SOLUTION INTRAVENOUS; SUBCUTANEOUS at 12:39

## 2023-01-01 RX ADMIN — SENNOSIDES 8.6 MG: 8.6 TABLET, COATED ORAL at 20:47

## 2023-01-01 RX ADMIN — ONDANSETRON 4 MG: 4 TABLET, ORALLY DISINTEGRATING ORAL at 12:55

## 2023-01-01 RX ADMIN — INSULIN ASPART 3 UNITS: 100 INJECTION, SOLUTION INTRAVENOUS; SUBCUTANEOUS at 18:09

## 2023-01-01 RX ADMIN — LIDOCAINE PATCH 4% 2 PATCH: 40 PATCH TOPICAL at 08:57

## 2023-01-01 RX ADMIN — BUMETANIDE 3 MG/HR: 0.25 INJECTION, SOLUTION INTRAMUSCULAR; INTRAVENOUS at 13:57

## 2023-01-01 RX ADMIN — UMECLIDINIUM 1 PUFF: 62.5 AEROSOL, POWDER ORAL at 10:57

## 2023-01-01 RX ADMIN — NICOTINE 1 PATCH: 14 PATCH, EXTENDED RELEASE TRANSDERMAL at 23:08

## 2023-01-01 RX ADMIN — KETOROLAC TROMETHAMINE 1 DROP: 5 SOLUTION OPHTHALMIC at 13:55

## 2023-01-01 RX ADMIN — NICOTINE 1 PATCH: 14 PATCH, EXTENDED RELEASE TRANSDERMAL at 00:02

## 2023-01-01 RX ADMIN — UMECLIDINIUM 1 PUFF: 62.5 AEROSOL, POWDER ORAL at 08:07

## 2023-01-01 RX ADMIN — HYDRALAZINE HYDROCHLORIDE 25 MG: 25 TABLET, FILM COATED ORAL at 04:49

## 2023-01-01 RX ADMIN — MICONAZOLE NITRATE: 20 CREAM TOPICAL at 22:44

## 2023-01-01 RX ADMIN — METHOCARBAMOL 500 MG: 500 TABLET ORAL at 12:07

## 2023-01-01 RX ADMIN — NITROFURANTOIN (MONOHYDRATE/MACROCRYSTALS) 100 MG: 75; 25 CAPSULE ORAL at 08:11

## 2023-01-01 RX ADMIN — UMECLIDINIUM 1 PUFF: 62.5 AEROSOL, POWDER ORAL at 08:26

## 2023-01-01 RX ADMIN — ACETAMINOPHEN 650 MG: 325 TABLET, FILM COATED ORAL at 22:00

## 2023-01-01 RX ADMIN — Medication 1 LOZENGE: at 17:13

## 2023-01-01 RX ADMIN — MICONAZOLE NITRATE: 20 CREAM TOPICAL at 09:27

## 2023-01-01 RX ADMIN — HYDRALAZINE HYDROCHLORIDE 10 MG: 10 TABLET, FILM COATED ORAL at 20:00

## 2023-01-01 RX ADMIN — HYDROMORPHONE HYDROCHLORIDE 0.2 MG: 0.2 INJECTION, SOLUTION INTRAMUSCULAR; INTRAVENOUS; SUBCUTANEOUS at 04:58

## 2023-01-01 RX ADMIN — BUMETANIDE 1 MG: 1 TABLET ORAL at 08:41

## 2023-01-01 RX ADMIN — METOPROLOL SUCCINATE 50 MG: 25 TABLET, EXTENDED RELEASE ORAL at 07:45

## 2023-01-01 RX ADMIN — INSULIN ASPART 7 UNITS: 100 INJECTION, SOLUTION INTRAVENOUS; SUBCUTANEOUS at 11:00

## 2023-01-01 RX ADMIN — Medication 1 LOZENGE: at 10:57

## 2023-01-01 RX ADMIN — DILTIAZEM HYDROCHLORIDE 120 MG: 60 CAPSULE, EXTENDED RELEASE ORAL at 07:54

## 2023-01-01 RX ADMIN — GABAPENTIN 600 MG: 300 CAPSULE ORAL at 20:59

## 2023-01-01 RX ADMIN — MAGNESIUM OXIDE TAB 400 MG (241.3 MG ELEMENTAL MG) 400 MG: 400 (241.3 MG) TAB at 17:09

## 2023-01-01 RX ADMIN — SENNOSIDES 8.6 MG: 8.6 TABLET, COATED ORAL at 12:41

## 2023-01-01 RX ADMIN — MICONAZOLE NITRATE: 20 CREAM TOPICAL at 19:48

## 2023-01-01 RX ADMIN — INSULIN ASPART 2 UNITS: 100 INJECTION, SOLUTION INTRAVENOUS; SUBCUTANEOUS at 13:57

## 2023-01-01 RX ADMIN — CIPROFLOXACIN HYDROCHLORIDE 500 MG: 500 TABLET, FILM COATED ORAL at 22:18

## 2023-01-01 RX ADMIN — HYDRALAZINE HYDROCHLORIDE 10 MG: 10 TABLET, FILM COATED ORAL at 09:02

## 2023-01-01 RX ADMIN — BUMETANIDE 3 MG: 1 TABLET ORAL at 07:49

## 2023-01-01 RX ADMIN — INSULIN GLARGINE 70 UNITS: 100 INJECTION, SOLUTION SUBCUTANEOUS at 08:16

## 2023-01-01 RX ADMIN — GABAPENTIN 600 MG: 300 CAPSULE ORAL at 08:05

## 2023-01-01 RX ADMIN — APIXABAN 5 MG: 5 TABLET, FILM COATED ORAL at 08:52

## 2023-01-01 RX ADMIN — OXYCODONE HYDROCHLORIDE 5 MG: 5 TABLET ORAL at 08:06

## 2023-01-01 RX ADMIN — METOPROLOL SUCCINATE 50 MG: 25 TABLET, EXTENDED RELEASE ORAL at 09:23

## 2023-01-01 RX ADMIN — APIXABAN 5 MG: 5 TABLET, FILM COATED ORAL at 07:58

## 2023-01-01 RX ADMIN — Medication 1 LOZENGE: at 09:10

## 2023-01-01 RX ADMIN — INSULIN ASPART 3 UNITS: 100 INJECTION, SOLUTION INTRAVENOUS; SUBCUTANEOUS at 17:08

## 2023-01-01 RX ADMIN — GABAPENTIN 600 MG: 300 CAPSULE ORAL at 14:07

## 2023-01-01 RX ADMIN — NICOTINE 1 PATCH: 14 PATCH, EXTENDED RELEASE TRANSDERMAL at 22:31

## 2023-01-01 RX ADMIN — CIPROFLOXACIN HYDROCHLORIDE 500 MG: 500 TABLET, FILM COATED ORAL at 08:41

## 2023-01-01 RX ADMIN — TERBINAFINE HYDROCHLORIDE: 1 CREAM TOPICAL at 08:07

## 2023-01-01 RX ADMIN — APIXABAN 5 MG: 5 TABLET, FILM COATED ORAL at 19:57

## 2023-01-01 RX ADMIN — OXYCODONE HYDROCHLORIDE AND ACETAMINOPHEN 1 TABLET: 5; 325 TABLET ORAL at 14:08

## 2023-01-01 RX ADMIN — HYDRALAZINE HYDROCHLORIDE 25 MG: 25 TABLET, FILM COATED ORAL at 00:26

## 2023-01-01 RX ADMIN — APIXABAN 5 MG: 5 TABLET, FILM COATED ORAL at 07:56

## 2023-01-01 RX ADMIN — GABAPENTIN 600 MG: 300 CAPSULE ORAL at 08:04

## 2023-01-01 RX ADMIN — INSULIN GLARGINE 40 UNITS: 100 INJECTION, SOLUTION SUBCUTANEOUS at 22:39

## 2023-01-01 RX ADMIN — Medication: at 08:34

## 2023-01-01 RX ADMIN — Medication: at 09:01

## 2023-01-01 RX ADMIN — INSULIN ASPART 4 UNITS: 100 INJECTION, SOLUTION INTRAVENOUS; SUBCUTANEOUS at 11:31

## 2023-01-01 RX ADMIN — DILTIAZEM HYDROCHLORIDE 120 MG: 60 CAPSULE, EXTENDED RELEASE ORAL at 08:30

## 2023-01-01 RX ADMIN — BUMETANIDE 1 MG: 1 TABLET ORAL at 08:07

## 2023-01-01 RX ADMIN — SIMETHICONE 80 MG: 80 TABLET, CHEWABLE ORAL at 20:09

## 2023-01-01 RX ADMIN — SPIRONOLACTONE 50 MG: 50 TABLET ORAL at 08:59

## 2023-01-01 RX ADMIN — DILTIAZEM HYDROCHLORIDE 120 MG: 60 CAPSULE, EXTENDED RELEASE ORAL at 07:58

## 2023-01-01 RX ADMIN — SPIRONOLACTONE 50 MG: 50 TABLET ORAL at 08:19

## 2023-01-01 RX ADMIN — HYDROXYZINE HYDROCHLORIDE 25 MG: 25 TABLET, FILM COATED ORAL at 12:34

## 2023-01-01 RX ADMIN — ATORVASTATIN CALCIUM 40 MG: 40 TABLET, FILM COATED ORAL at 07:56

## 2023-01-01 RX ADMIN — ISOSORBIDE DINITRATE 20 MG: 20 TABLET ORAL at 09:12

## 2023-01-01 RX ADMIN — APIXABAN 5 MG: 5 TABLET, FILM COATED ORAL at 07:45

## 2023-01-01 RX ADMIN — METOPROLOL SUCCINATE 50 MG: 50 TABLET, EXTENDED RELEASE ORAL at 08:31

## 2023-01-01 RX ADMIN — ISOSORBIDE DINITRATE 20 MG: 20 TABLET ORAL at 17:41

## 2023-01-01 RX ADMIN — APIXABAN 5 MG: 5 TABLET, FILM COATED ORAL at 20:23

## 2023-01-01 RX ADMIN — BUMETANIDE 3 MG: 1 TABLET ORAL at 16:53

## 2023-01-01 RX ADMIN — ACETAMINOPHEN 650 MG: 325 TABLET ORAL at 21:12

## 2023-01-01 RX ADMIN — METHOCARBAMOL 500 MG: 500 TABLET ORAL at 06:52

## 2023-01-01 RX ADMIN — NICOTINE 1 PATCH: 14 PATCH, EXTENDED RELEASE TRANSDERMAL at 07:55

## 2023-01-01 RX ADMIN — LEVOTHYROXINE SODIUM 200 MCG: 0.03 TABLET ORAL at 12:06

## 2023-01-01 RX ADMIN — SENNOSIDES 8.6 MG: 8.6 TABLET, COATED ORAL at 19:57

## 2023-01-01 RX ADMIN — CAPSAICIN: 0.25 CREAM TOPICAL at 19:37

## 2023-01-01 RX ADMIN — SENNOSIDES 8.6 MG: 8.6 TABLET, COATED ORAL at 19:35

## 2023-01-01 RX ADMIN — ALBUTEROL SULFATE 2.5 MG: 2.5 SOLUTION RESPIRATORY (INHALATION) at 20:52

## 2023-01-01 RX ADMIN — GABAPENTIN 600 MG: 300 CAPSULE ORAL at 09:11

## 2023-01-01 RX ADMIN — INSULIN ASPART 10 UNITS: 100 INJECTION, SOLUTION INTRAVENOUS; SUBCUTANEOUS at 12:34

## 2023-01-01 RX ADMIN — METOPROLOL SUCCINATE 50 MG: 50 TABLET, EXTENDED RELEASE ORAL at 08:18

## 2023-01-01 RX ADMIN — Medication 1 LOZENGE: at 12:38

## 2023-01-01 RX ADMIN — NICOTINE 1 PATCH: 14 PATCH, EXTENDED RELEASE TRANSDERMAL at 00:13

## 2023-01-01 RX ADMIN — ACETAMINOPHEN 650 MG: 325 TABLET, FILM COATED ORAL at 13:56

## 2023-01-01 RX ADMIN — BUMETANIDE 3 MG/HR: 0.25 INJECTION, SOLUTION INTRAMUSCULAR; INTRAVENOUS at 03:02

## 2023-01-01 RX ADMIN — DOCUSATE SODIUM 100 MG: 50 CAPSULE, LIQUID FILLED ORAL at 08:18

## 2023-01-01 RX ADMIN — ACETAMINOPHEN 650 MG: 325 TABLET ORAL at 22:09

## 2023-01-01 RX ADMIN — MICONAZOLE NITRATE: 20 CREAM TOPICAL at 07:59

## 2023-01-01 RX ADMIN — NITROFURANTOIN (MONOHYDRATE/MACROCRYSTALS) 100 MG: 75; 25 CAPSULE ORAL at 17:16

## 2023-01-01 RX ADMIN — ACETAMINOPHEN 650 MG: 325 TABLET, FILM COATED ORAL at 08:18

## 2023-01-01 RX ADMIN — SENNOSIDES 8.6 MG: 8.6 TABLET, COATED ORAL at 19:38

## 2023-01-01 RX ADMIN — DOCUSATE SODIUM 50 MG: 50 CAPSULE, LIQUID FILLED ORAL at 08:32

## 2023-01-01 RX ADMIN — INSULIN ASPART 15 UNITS: 100 INJECTION, SOLUTION INTRAVENOUS; SUBCUTANEOUS at 08:23

## 2023-01-01 RX ADMIN — UMECLIDINIUM 1 PUFF: 62.5 AEROSOL, POWDER ORAL at 08:11

## 2023-01-01 RX ADMIN — HYDROXYZINE HYDROCHLORIDE 50 MG: 25 TABLET, FILM COATED ORAL at 00:03

## 2023-01-01 RX ADMIN — INSULIN ASPART 6 UNITS: 100 INJECTION, SOLUTION INTRAVENOUS; SUBCUTANEOUS at 17:02

## 2023-01-01 RX ADMIN — POLYETHYLENE GLYCOL 3350 17 G: 17 POWDER, FOR SOLUTION ORAL at 09:05

## 2023-01-01 RX ADMIN — NITROFURANTOIN (MONOHYDRATE/MACROCRYSTALS) 100 MG: 75; 25 CAPSULE ORAL at 08:07

## 2023-01-01 RX ADMIN — SENNOSIDES 8.6 MG: 8.6 TABLET ORAL at 20:15

## 2023-01-01 RX ADMIN — UMECLIDINIUM 1 PUFF: 62.5 AEROSOL, POWDER ORAL at 08:57

## 2023-01-01 RX ADMIN — HYDRALAZINE HYDROCHLORIDE 10 MG: 10 TABLET ORAL at 13:44

## 2023-01-01 RX ADMIN — INSULIN ASPART 1 UNITS: 100 INJECTION, SOLUTION INTRAVENOUS; SUBCUTANEOUS at 17:59

## 2023-01-01 RX ADMIN — LEVOTHYROXINE SODIUM 400 MCG: 0.15 TABLET ORAL at 05:56

## 2023-01-01 RX ADMIN — APIXABAN 5 MG: 5 TABLET, FILM COATED ORAL at 19:43

## 2023-01-01 RX ADMIN — OXYCODONE HYDROCHLORIDE 5 MG: 5 TABLET ORAL at 08:48

## 2023-01-01 RX ADMIN — DILTIAZEM HYDROCHLORIDE 120 MG: 60 CAPSULE, EXTENDED RELEASE ORAL at 08:32

## 2023-01-01 RX ADMIN — LABETALOL HYDROCHLORIDE 10 MG: 5 INJECTION, SOLUTION INTRAVENOUS at 16:17

## 2023-01-01 RX ADMIN — CALCIUM GLUCONATE 1 G: 98 INJECTION, SOLUTION INTRAVENOUS at 00:10

## 2023-01-01 RX ADMIN — BUMETANIDE 1 MG: 1 TABLET ORAL at 09:21

## 2023-01-01 RX ADMIN — Medication 1 LOZENGE: at 10:24

## 2023-01-01 RX ADMIN — GABAPENTIN 600 MG: 300 CAPSULE ORAL at 13:11

## 2023-01-01 RX ADMIN — INSULIN ASPART 2 UNITS: 100 INJECTION, SOLUTION INTRAVENOUS; SUBCUTANEOUS at 06:59

## 2023-01-01 RX ADMIN — ACETAMINOPHEN 325MG 650 MG: 325 TABLET ORAL at 22:28

## 2023-01-01 RX ADMIN — OXYCODONE HYDROCHLORIDE AND ACETAMINOPHEN 1 TABLET: 5; 325 TABLET ORAL at 16:07

## 2023-01-01 RX ADMIN — APIXABAN 5 MG: 5 TABLET, FILM COATED ORAL at 20:02

## 2023-01-01 RX ADMIN — TOPIRAMATE 75 MG: 25 TABLET, FILM COATED ORAL at 22:34

## 2023-01-01 RX ADMIN — OXYCODONE HYDROCHLORIDE AND ACETAMINOPHEN 1 TABLET: 5; 325 TABLET ORAL at 20:52

## 2023-01-01 RX ADMIN — NICOTINE POLACRILEX 2 MG: 2 GUM, CHEWING BUCCAL at 14:41

## 2023-01-01 RX ADMIN — METOPROLOL SUCCINATE 50 MG: 25 TABLET, EXTENDED RELEASE ORAL at 08:10

## 2023-01-01 RX ADMIN — LEVOTHYROXINE SODIUM 200 MCG: 100 TABLET ORAL at 08:14

## 2023-01-01 RX ADMIN — APIXABAN 5 MG: 5 TABLET, FILM COATED ORAL at 20:35

## 2023-01-01 RX ADMIN — METOPROLOL SUCCINATE 50 MG: 25 TABLET, EXTENDED RELEASE ORAL at 08:33

## 2023-01-01 RX ADMIN — HYDROMORPHONE HYDROCHLORIDE 0.2 MG: 0.2 INJECTION, SOLUTION INTRAMUSCULAR; INTRAVENOUS; SUBCUTANEOUS at 12:26

## 2023-01-01 RX ADMIN — OXYCODONE HYDROCHLORIDE AND ACETAMINOPHEN 1 TABLET: 5; 325 TABLET ORAL at 06:11

## 2023-01-01 RX ADMIN — DILTIAZEM HYDROCHLORIDE 120 MG: 60 CAPSULE, EXTENDED RELEASE ORAL at 08:04

## 2023-01-01 RX ADMIN — OXYCODONE HYDROCHLORIDE AND ACETAMINOPHEN 1 TABLET: 5; 325 TABLET ORAL at 23:56

## 2023-01-01 RX ADMIN — SPIRONOLACTONE 50 MG: 50 TABLET ORAL at 09:02

## 2023-01-01 RX ADMIN — GABAPENTIN 600 MG: 300 CAPSULE ORAL at 13:23

## 2023-01-01 RX ADMIN — OXYCODONE HYDROCHLORIDE AND ACETAMINOPHEN 1 TABLET: 5; 325 TABLET ORAL at 08:10

## 2023-01-01 RX ADMIN — CIPROFLOXACIN HYDROCHLORIDE 500 MG: 500 TABLET, FILM COATED ORAL at 20:40

## 2023-01-01 RX ADMIN — Medication 1 LOZENGE: at 17:17

## 2023-01-01 RX ADMIN — METOPROLOL SUCCINATE 25 MG: 25 TABLET, EXTENDED RELEASE ORAL at 07:52

## 2023-01-01 RX ADMIN — DEXTROSE MONOHYDRATE 50 ML: 25 INJECTION, SOLUTION INTRAVENOUS at 20:15

## 2023-01-01 RX ADMIN — OXYCODONE HYDROCHLORIDE 5 MG: 5 TABLET ORAL at 14:43

## 2023-01-01 RX ADMIN — BUMETANIDE 5 MG: 0.25 INJECTION INTRAMUSCULAR; INTRAVENOUS at 17:06

## 2023-01-01 RX ADMIN — INSULIN ASPART 11 UNITS: 100 INJECTION, SOLUTION INTRAVENOUS; SUBCUTANEOUS at 12:18

## 2023-01-01 RX ADMIN — DILTIAZEM HYDROCHLORIDE 120 MG: 60 CAPSULE, EXTENDED RELEASE ORAL at 08:10

## 2023-01-01 RX ADMIN — SENNOSIDES 8.6 MG: 8.6 TABLET, COATED ORAL at 20:38

## 2023-01-01 RX ADMIN — OXYCODONE HYDROCHLORIDE 5 MG: 5 TABLET ORAL at 18:03

## 2023-01-01 RX ADMIN — NYSTATIN: 100000 CREAM TOPICAL at 07:46

## 2023-01-01 RX ADMIN — MICONAZOLE NITRATE: 20 CREAM TOPICAL at 21:40

## 2023-01-01 RX ADMIN — INSULIN GLARGINE 40 UNITS: 100 INJECTION, SOLUTION SUBCUTANEOUS at 22:36

## 2023-01-01 RX ADMIN — HUMAN INSULIN 3 UNITS/HR: 100 INJECTION, SOLUTION SUBCUTANEOUS at 02:44

## 2023-01-01 RX ADMIN — MICONAZOLE NITRATE: 20 POWDER TOPICAL at 08:30

## 2023-01-01 RX ADMIN — APIXABAN 5 MG: 5 TABLET, FILM COATED ORAL at 09:12

## 2023-01-01 RX ADMIN — LEVOTHYROXINE SODIUM 200 MCG: 100 TABLET ORAL at 07:55

## 2023-01-01 RX ADMIN — NICOTINE POLACRILEX 2 MG: 2 GUM, CHEWING BUCCAL at 09:20

## 2023-01-01 RX ADMIN — HYDROMORPHONE HYDROCHLORIDE 0.2 MG: 0.2 INJECTION, SOLUTION INTRAMUSCULAR; INTRAVENOUS; SUBCUTANEOUS at 16:51

## 2023-01-01 RX ADMIN — HYDRALAZINE HYDROCHLORIDE 10 MG: 10 TABLET ORAL at 14:56

## 2023-01-01 RX ADMIN — MICONAZOLE NITRATE: 20 POWDER TOPICAL at 20:45

## 2023-01-01 RX ADMIN — BISACODYL 10 MG: 10 SUPPOSITORY RECTAL at 18:56

## 2023-01-01 RX ADMIN — OXYCODONE HYDROCHLORIDE AND ACETAMINOPHEN 1 TABLET: 5; 325 TABLET ORAL at 09:59

## 2023-01-01 RX ADMIN — HYDRALAZINE HYDROCHLORIDE 10 MG: 10 TABLET ORAL at 13:36

## 2023-01-01 RX ADMIN — GABAPENTIN 300 MG: 300 CAPSULE ORAL at 13:32

## 2023-01-01 RX ADMIN — MICONAZOLE NITRATE: 20 CREAM TOPICAL at 07:53

## 2023-01-01 RX ADMIN — LIDOCAINE PATCH 4% 2 PATCH: 40 PATCH TOPICAL at 07:50

## 2023-01-01 RX ADMIN — METOLAZONE 5 MG: 5 TABLET ORAL at 11:12

## 2023-01-01 RX ADMIN — INSULIN ASPART 2 UNITS: 100 INJECTION, SOLUTION INTRAVENOUS; SUBCUTANEOUS at 17:18

## 2023-01-01 RX ADMIN — NYSTATIN: 100000 CREAM TOPICAL at 08:06

## 2023-01-01 RX ADMIN — SENNOSIDES 8.6 MG: 8.6 TABLET, COATED ORAL at 09:12

## 2023-01-01 RX ADMIN — ATORVASTATIN CALCIUM 40 MG: 40 TABLET, FILM COATED ORAL at 07:54

## 2023-01-01 RX ADMIN — DILTIAZEM HYDROCHLORIDE 120 MG: 60 CAPSULE, EXTENDED RELEASE ORAL at 21:18

## 2023-01-01 RX ADMIN — ATORVASTATIN CALCIUM 40 MG: 40 TABLET, FILM COATED ORAL at 08:34

## 2023-01-01 RX ADMIN — HYDRALAZINE HYDROCHLORIDE 10 MG: 10 TABLET ORAL at 20:47

## 2023-01-01 RX ADMIN — OXYCODONE HYDROCHLORIDE 5 MG: 5 TABLET ORAL at 22:28

## 2023-01-01 RX ADMIN — APIXABAN 5 MG: 5 TABLET, FILM COATED ORAL at 08:26

## 2023-01-01 RX ADMIN — Medication 1 LOZENGE: at 07:55

## 2023-01-01 RX ADMIN — INSULIN ASPART 5 UNITS: 100 INJECTION, SOLUTION INTRAVENOUS; SUBCUTANEOUS at 12:17

## 2023-01-01 RX ADMIN — LEVOTHYROXINE SODIUM 200 MCG: 0.03 TABLET ORAL at 10:22

## 2023-01-01 RX ADMIN — METOPROLOL SUCCINATE 50 MG: 25 TABLET, EXTENDED RELEASE ORAL at 07:58

## 2023-01-01 RX ADMIN — DILTIAZEM HYDROCHLORIDE 120 MG: 60 CAPSULE, EXTENDED RELEASE ORAL at 07:45

## 2023-01-01 RX ADMIN — SODIUM CHLORIDE, POTASSIUM CHLORIDE, SODIUM LACTATE AND CALCIUM CHLORIDE 500 ML: 600; 310; 30; 20 INJECTION, SOLUTION INTRAVENOUS at 20:13

## 2023-01-01 RX ADMIN — BUMETANIDE 1 MG: 1 TABLET ORAL at 07:58

## 2023-01-01 RX ADMIN — Medication 12.5 MG: at 20:47

## 2023-01-01 RX ADMIN — Medication 1 LOZENGE: at 12:34

## 2023-01-01 RX ADMIN — METOPROLOL SUCCINATE 50 MG: 25 TABLET, EXTENDED RELEASE ORAL at 08:06

## 2023-01-01 RX ADMIN — CIPROFLOXACIN HYDROCHLORIDE 500 MG: 500 TABLET, FILM COATED ORAL at 08:52

## 2023-01-01 RX ADMIN — UMECLIDINIUM 1 PUFF: 62.5 AEROSOL, POWDER ORAL at 08:43

## 2023-01-01 RX ADMIN — HUMAN INSULIN 10 UNITS: 100 INJECTION, SOLUTION SUBCUTANEOUS at 00:11

## 2023-01-01 RX ADMIN — BUMETANIDE 1 MG: 1 TABLET ORAL at 16:13

## 2023-01-01 RX ADMIN — NICOTINE POLACRILEX 2 MG: 2 GUM, CHEWING BUCCAL at 09:50

## 2023-01-01 RX ADMIN — HYDRALAZINE HYDROCHLORIDE 10 MG: 10 TABLET, FILM COATED ORAL at 08:21

## 2023-01-01 RX ADMIN — ACETAMINOPHEN 650 MG: 325 TABLET, FILM COATED ORAL at 23:36

## 2023-01-01 RX ADMIN — UMECLIDINIUM 1 PUFF: 62.5 AEROSOL, POWDER ORAL at 09:02

## 2023-01-01 RX ADMIN — DOCUSATE SODIUM 50 MG: 50 CAPSULE, LIQUID FILLED ORAL at 21:33

## 2023-01-01 RX ADMIN — HYDRALAZINE HYDROCHLORIDE 10 MG: 10 TABLET ORAL at 08:10

## 2023-01-01 RX ADMIN — MICONAZOLE NITRATE: 20 CREAM TOPICAL at 20:57

## 2023-01-01 RX ADMIN — ISOSORBIDE DINITRATE 20 MG: 20 TABLET ORAL at 08:07

## 2023-01-01 RX ADMIN — ACETAMINOPHEN 325MG 650 MG: 325 TABLET ORAL at 14:39

## 2023-01-01 RX ADMIN — DOCUSATE SODIUM 50 MG: 50 CAPSULE, LIQUID FILLED ORAL at 20:23

## 2023-01-01 RX ADMIN — SPIRONOLACTONE 50 MG: 50 TABLET ORAL at 08:47

## 2023-01-01 RX ADMIN — CALCIUM GLUCONATE 1 G: 20 INJECTION, SOLUTION INTRAVENOUS at 15:52

## 2023-01-01 RX ADMIN — MAGNESIUM OXIDE TAB 400 MG (240 MG ELEMENTAL MG) 400 MG: 400 (240 MG) TAB at 09:03

## 2023-01-01 RX ADMIN — NICOTINE 1 PATCH: 14 PATCH, EXTENDED RELEASE TRANSDERMAL at 09:08

## 2023-01-01 RX ADMIN — INSULIN ASPART 7 UNITS: 100 INJECTION, SOLUTION INTRAVENOUS; SUBCUTANEOUS at 12:16

## 2023-01-01 RX ADMIN — METHOCARBAMOL 500 MG: 500 TABLET ORAL at 09:31

## 2023-01-01 RX ADMIN — SIMETHICONE 80 MG: 80 TABLET, CHEWABLE ORAL at 12:25

## 2023-01-01 RX ADMIN — SENNOSIDES 8.6 MG: 8.6 TABLET, COATED ORAL at 07:52

## 2023-01-01 RX ADMIN — METOPROLOL SUCCINATE 25 MG: 25 TABLET, EXTENDED RELEASE ORAL at 07:47

## 2023-01-01 RX ADMIN — DILTIAZEM HYDROCHLORIDE 180 MG: 180 CAPSULE, COATED, EXTENDED RELEASE ORAL at 07:56

## 2023-01-01 RX ADMIN — DILTIAZEM HYDROCHLORIDE 120 MG: 60 CAPSULE, EXTENDED RELEASE ORAL at 21:27

## 2023-01-01 RX ADMIN — SIMETHICONE 80 MG: 80 TABLET, CHEWABLE ORAL at 21:12

## 2023-01-01 RX ADMIN — APIXABAN 5 MG: 5 TABLET, FILM COATED ORAL at 19:51

## 2023-01-01 RX ADMIN — METHOCARBAMOL 500 MG: 500 TABLET ORAL at 18:22

## 2023-01-01 RX ADMIN — METHOCARBAMOL 500 MG: 500 TABLET ORAL at 20:02

## 2023-01-01 RX ADMIN — BUMETANIDE 1 MG: 1 TABLET ORAL at 18:28

## 2023-01-01 RX ADMIN — Medication 1 LOZENGE: at 21:12

## 2023-01-01 RX ADMIN — APIXABAN 5 MG: 5 TABLET, FILM COATED ORAL at 08:07

## 2023-01-01 RX ADMIN — INSULIN GLARGINE 45 UNITS: 100 INJECTION, SOLUTION SUBCUTANEOUS at 08:06

## 2023-01-01 RX ADMIN — MICONAZOLE NITRATE: 20 CREAM TOPICAL at 07:52

## 2023-01-01 RX ADMIN — INSULIN ASPART 16 UNITS: 100 INJECTION, SOLUTION INTRAVENOUS; SUBCUTANEOUS at 11:58

## 2023-01-01 RX ADMIN — TERBINAFINE HYDROCHLORIDE: 1 CREAM TOPICAL at 08:16

## 2023-01-01 RX ADMIN — UMECLIDINIUM 1 PUFF: 62.5 AEROSOL, POWDER ORAL at 09:08

## 2023-01-01 RX ADMIN — KETOROLAC TROMETHAMINE 1 DROP: 5 SOLUTION OPHTHALMIC at 19:45

## 2023-01-01 RX ADMIN — TOPIRAMATE 25 MG: 25 TABLET, FILM COATED ORAL at 08:22

## 2023-01-01 RX ADMIN — CIPROFLOXACIN HYDROCHLORIDE 500 MG: 500 TABLET, FILM COATED ORAL at 07:58

## 2023-01-01 RX ADMIN — APIXABAN 5 MG: 5 TABLET, FILM COATED ORAL at 21:40

## 2023-01-01 RX ADMIN — GABAPENTIN 600 MG: 300 CAPSULE ORAL at 08:46

## 2023-01-01 RX ADMIN — NITROFURANTOIN (MONOHYDRATE/MACROCRYSTALS) 100 MG: 75; 25 CAPSULE ORAL at 19:49

## 2023-01-01 RX ADMIN — METOPROLOL SUCCINATE 50 MG: 50 TABLET, EXTENDED RELEASE ORAL at 07:56

## 2023-01-01 RX ADMIN — SIMETHICONE 80 MG: 80 TABLET, CHEWABLE ORAL at 08:12

## 2023-01-01 RX ADMIN — INSULIN ASPART 5 UNITS: 100 INJECTION, SOLUTION INTRAVENOUS; SUBCUTANEOUS at 17:06

## 2023-01-01 RX ADMIN — UMECLIDINIUM 1 PUFF: 62.5 AEROSOL, POWDER ORAL at 09:07

## 2023-01-01 RX ADMIN — MAGNESIUM OXIDE TAB 400 MG (240 MG ELEMENTAL MG) 400 MG: 400 (240 MG) TAB at 12:07

## 2023-01-01 RX ADMIN — METHOCARBAMOL 500 MG: 500 TABLET ORAL at 21:54

## 2023-01-01 RX ADMIN — CAPSAICIN: 0.25 CREAM TOPICAL at 19:46

## 2023-01-01 RX ADMIN — OXYCODONE HYDROCHLORIDE AND ACETAMINOPHEN 1 TABLET: 5; 325 TABLET ORAL at 16:49

## 2023-01-01 RX ADMIN — METOPROLOL SUCCINATE 50 MG: 50 TABLET, EXTENDED RELEASE ORAL at 08:47

## 2023-01-01 RX ADMIN — BUMETANIDE 1 MG: 1 TABLET ORAL at 08:10

## 2023-01-01 RX ADMIN — ATORVASTATIN CALCIUM 40 MG: 40 TABLET, FILM COATED ORAL at 08:05

## 2023-01-01 RX ADMIN — BUMETANIDE 1 MG: 1 TABLET ORAL at 08:36

## 2023-01-01 RX ADMIN — APIXABAN 5 MG: 5 TABLET, FILM COATED ORAL at 21:30

## 2023-01-01 RX ADMIN — UMECLIDINIUM 1 PUFF: 62.5 AEROSOL, POWDER ORAL at 07:45

## 2023-01-01 RX ADMIN — OXYCODONE HYDROCHLORIDE AND ACETAMINOPHEN 1 TABLET: 5; 325 TABLET ORAL at 13:19

## 2023-01-01 RX ADMIN — METOPROLOL SUCCINATE 25 MG: 25 TABLET, EXTENDED RELEASE ORAL at 08:07

## 2023-01-01 RX ADMIN — MICONAZOLE NITRATE: 20 CREAM TOPICAL at 08:30

## 2023-01-01 RX ADMIN — Medication: at 16:14

## 2023-01-01 RX ADMIN — NICOTINE 1 PATCH: 14 PATCH, EXTENDED RELEASE TRANSDERMAL at 08:30

## 2023-01-01 RX ADMIN — MAGNESIUM OXIDE TAB 400 MG (241.3 MG ELEMENTAL MG) 400 MG: 400 (241.3 MG) TAB at 13:54

## 2023-01-01 RX ADMIN — ACETAMINOPHEN 325MG 650 MG: 325 TABLET ORAL at 18:09

## 2023-01-01 RX ADMIN — SENNOSIDES 8.6 MG: 8.6 TABLET, COATED ORAL at 08:38

## 2023-01-01 RX ADMIN — APIXABAN 5 MG: 5 TABLET, FILM COATED ORAL at 08:32

## 2023-01-01 RX ADMIN — Medication 12.5 MG: at 12:47

## 2023-01-01 RX ADMIN — HYDROMORPHONE HYDROCHLORIDE 0.2 MG: 0.2 INJECTION, SOLUTION INTRAMUSCULAR; INTRAVENOUS; SUBCUTANEOUS at 22:06

## 2023-01-01 RX ADMIN — OXYCODONE HYDROCHLORIDE 5 MG: 5 TABLET ORAL at 06:12

## 2023-01-01 RX ADMIN — NICOTINE 1 PATCH: 14 PATCH, EXTENDED RELEASE TRANSDERMAL at 08:11

## 2023-01-01 RX ADMIN — DICLOFENAC SODIUM 2 G: 10 GEL TOPICAL at 01:56

## 2023-01-01 RX ADMIN — INSULIN ASPART 2 UNITS: 100 INJECTION, SOLUTION INTRAVENOUS; SUBCUTANEOUS at 12:35

## 2023-01-01 RX ADMIN — INSULIN ASPART 7 UNITS: 100 INJECTION, SOLUTION INTRAVENOUS; SUBCUTANEOUS at 08:36

## 2023-01-01 RX ADMIN — TOPIRAMATE 25 MG: 25 TABLET, FILM COATED ORAL at 07:46

## 2023-01-01 RX ADMIN — GABAPENTIN 600 MG: 300 CAPSULE ORAL at 14:24

## 2023-01-01 RX ADMIN — APIXABAN 5 MG: 5 TABLET, FILM COATED ORAL at 08:06

## 2023-01-01 RX ADMIN — MAGNESIUM OXIDE TAB 400 MG (240 MG ELEMENTAL MG) 400 MG: 400 (240 MG) TAB at 21:04

## 2023-01-01 RX ADMIN — OXYCODONE HYDROCHLORIDE AND ACETAMINOPHEN 1 TABLET: 5; 325 TABLET ORAL at 02:40

## 2023-01-01 RX ADMIN — HYDROMORPHONE HYDROCHLORIDE 0.2 MG: 0.2 INJECTION, SOLUTION INTRAMUSCULAR; INTRAVENOUS; SUBCUTANEOUS at 01:50

## 2023-01-01 RX ADMIN — Medication 5 ML: at 04:58

## 2023-01-01 RX ADMIN — ACETAMINOPHEN 650 MG: 325 TABLET, FILM COATED ORAL at 16:20

## 2023-01-01 RX ADMIN — APIXABAN 5 MG: 5 TABLET, FILM COATED ORAL at 08:21

## 2023-01-01 RX ADMIN — ACETAMINOPHEN 325MG 650 MG: 325 TABLET ORAL at 23:47

## 2023-01-01 RX ADMIN — Medication 5 ML: at 04:57

## 2023-01-01 RX ADMIN — MAGNESIUM SULFATE HEPTAHYDRATE 2 G: 40 INJECTION, SOLUTION INTRAVENOUS at 05:22

## 2023-01-01 RX ADMIN — Medication 1 LOZENGE: at 03:50

## 2023-01-01 RX ADMIN — APIXABAN 5 MG: 5 TABLET, FILM COATED ORAL at 07:57

## 2023-01-01 RX ADMIN — CYCLOBENZAPRINE 10 MG: 10 TABLET, FILM COATED ORAL at 09:14

## 2023-01-01 RX ADMIN — TOPIRAMATE 25 MG: 25 TABLET, FILM COATED ORAL at 08:08

## 2023-01-01 RX ADMIN — NYSTATIN: 100000 CREAM TOPICAL at 07:51

## 2023-01-01 RX ADMIN — SIMETHICONE 80 MG: 80 TABLET, CHEWABLE ORAL at 08:19

## 2023-01-01 RX ADMIN — ACETAMINOPHEN 650 MG: 325 TABLET, FILM COATED ORAL at 17:51

## 2023-01-01 RX ADMIN — ACETAMINOPHEN 325MG 650 MG: 325 TABLET ORAL at 16:03

## 2023-01-01 RX ADMIN — BUMETANIDE 3 MG: 1 TABLET ORAL at 08:39

## 2023-01-01 RX ADMIN — SENNOSIDES 8.6 MG: 8.6 TABLET, COATED ORAL at 08:21

## 2023-01-01 RX ADMIN — DILTIAZEM HYDROCHLORIDE 120 MG: 120 CAPSULE, COATED, EXTENDED RELEASE ORAL at 21:53

## 2023-01-01 RX ADMIN — GABAPENTIN 600 MG: 300 CAPSULE ORAL at 15:25

## 2023-01-01 RX ADMIN — CIPROFLOXACIN HYDROCHLORIDE 500 MG: 500 TABLET, FILM COATED ORAL at 08:10

## 2023-01-01 RX ADMIN — DEXTROSE MONOHYDRATE 50 ML: 25 INJECTION, SOLUTION INTRAVENOUS at 10:35

## 2023-01-01 RX ADMIN — BUMETANIDE 3 MG/HR: 0.25 INJECTION, SOLUTION INTRAMUSCULAR; INTRAVENOUS at 22:39

## 2023-01-01 RX ADMIN — Medication 5 ML: at 06:15

## 2023-01-01 RX ADMIN — ACETAMINOPHEN 325MG 650 MG: 325 TABLET ORAL at 08:19

## 2023-01-01 RX ADMIN — OXYCODONE HYDROCHLORIDE AND ACETAMINOPHEN 1 TABLET: 5; 325 TABLET ORAL at 11:05

## 2023-01-01 RX ADMIN — METOPROLOL SUCCINATE 25 MG: 25 TABLET, EXTENDED RELEASE ORAL at 08:39

## 2023-01-01 RX ADMIN — BUMETANIDE 1 MG: 1 TABLET ORAL at 18:08

## 2023-01-01 RX ADMIN — UMECLIDINIUM 1 PUFF: 62.5 AEROSOL, POWDER ORAL at 08:13

## 2023-01-01 RX ADMIN — SIMETHICONE 80 MG: 80 TABLET, CHEWABLE ORAL at 08:14

## 2023-01-01 RX ADMIN — APIXABAN 5 MG: 5 TABLET, FILM COATED ORAL at 08:19

## 2023-01-01 RX ADMIN — NITROFURANTOIN (MONOHYDRATE/MACROCRYSTALS) 100 MG: 75; 25 CAPSULE ORAL at 07:48

## 2023-01-01 RX ADMIN — OXYCODONE HYDROCHLORIDE AND ACETAMINOPHEN 1 TABLET: 5; 325 TABLET ORAL at 13:38

## 2023-01-01 RX ADMIN — SENNOSIDES 8.6 MG: 8.6 TABLET ORAL at 09:07

## 2023-01-01 RX ADMIN — BUMETANIDE 2 MG/HR: 0.25 INJECTION, SOLUTION INTRAMUSCULAR; INTRAVENOUS at 17:58

## 2023-01-01 RX ADMIN — APIXABAN 5 MG: 5 TABLET, FILM COATED ORAL at 07:54

## 2023-01-01 RX ADMIN — NYSTATIN: 100000 CREAM TOPICAL at 08:42

## 2023-01-01 RX ADMIN — CAPSAICIN: 0.25 CREAM TOPICAL at 20:17

## 2023-01-01 RX ADMIN — DILTIAZEM HYDROCHLORIDE 180 MG: 180 CAPSULE, COATED, EXTENDED RELEASE ORAL at 22:34

## 2023-01-01 RX ADMIN — MAGNESIUM OXIDE TAB 400 MG (240 MG ELEMENTAL MG) 400 MG: 400 (240 MG) TAB at 10:45

## 2023-01-01 RX ADMIN — METHOCARBAMOL 500 MG: 500 TABLET ORAL at 21:04

## 2023-01-01 RX ADMIN — UMECLIDINIUM 1 PUFF: 62.5 AEROSOL, POWDER ORAL at 08:01

## 2023-01-01 RX ADMIN — INSULIN ASPART 2 UNITS: 100 INJECTION, SOLUTION INTRAVENOUS; SUBCUTANEOUS at 11:32

## 2023-01-01 RX ADMIN — Medication 1 LOZENGE: at 18:08

## 2023-01-01 RX ADMIN — Medication 12.5 MG: at 08:15

## 2023-01-01 RX ADMIN — OXYCODONE HYDROCHLORIDE 5 MG: 5 TABLET ORAL at 22:13

## 2023-01-01 RX ADMIN — TERBINAFINE HYDROCHLORIDE: 1 CREAM TOPICAL at 08:59

## 2023-01-01 RX ADMIN — ISOSORBIDE DINITRATE 20 MG: 20 TABLET ORAL at 23:58

## 2023-01-01 RX ADMIN — INSULIN GLARGINE 65 UNITS: 300 INJECTION, SOLUTION SUBCUTANEOUS at 08:33

## 2023-01-01 RX ADMIN — LIRAGLUTIDE 1.2 MG: 6 INJECTION SUBCUTANEOUS at 08:32

## 2023-01-01 RX ADMIN — INSULIN ASPART 11 UNITS: 100 INJECTION, SOLUTION INTRAVENOUS; SUBCUTANEOUS at 17:14

## 2023-01-01 RX ADMIN — NYSTATIN: 100000 CREAM TOPICAL at 20:39

## 2023-01-01 RX ADMIN — ATORVASTATIN CALCIUM 40 MG: 40 TABLET, FILM COATED ORAL at 07:57

## 2023-01-01 RX ADMIN — METHOCARBAMOL 500 MG: 500 TABLET ORAL at 08:19

## 2023-01-01 RX ADMIN — LEVOTHYROXINE SODIUM 400 MCG: 0.2 TABLET ORAL at 08:33

## 2023-01-01 RX ADMIN — ACETAMINOPHEN 650 MG: 325 TABLET, FILM COATED ORAL at 19:35

## 2023-01-01 RX ADMIN — METOPROLOL SUCCINATE 25 MG: 25 TABLET, EXTENDED RELEASE ORAL at 07:45

## 2023-01-01 RX ADMIN — HUMAN INSULIN 5.5 UNITS/HR: 100 INJECTION, SOLUTION SUBCUTANEOUS at 22:07

## 2023-01-01 RX ADMIN — UMECLIDINIUM 1 PUFF: 62.5 AEROSOL, POWDER ORAL at 08:31

## 2023-01-01 RX ADMIN — MAGNESIUM OXIDE TAB 400 MG (241.3 MG ELEMENTAL MG) 400 MG: 400 (241.3 MG) TAB at 11:12

## 2023-01-01 RX ADMIN — LEVOTHYROXINE SODIUM 200 MCG: 0.03 TABLET ORAL at 09:01

## 2023-01-01 RX ADMIN — CYCLOBENZAPRINE 10 MG: 10 TABLET, FILM COATED ORAL at 18:37

## 2023-01-01 RX ADMIN — BUMETANIDE 1 MG: 1 TABLET ORAL at 13:06

## 2023-01-01 RX ADMIN — LEVOTHYROXINE SODIUM 200 MCG: 0.03 TABLET ORAL at 10:44

## 2023-01-01 RX ADMIN — OXYCODONE HYDROCHLORIDE AND ACETAMINOPHEN 1 TABLET: 5; 325 TABLET ORAL at 05:58

## 2023-01-01 RX ADMIN — OXYCODONE HYDROCHLORIDE 5 MG: 5 TABLET ORAL at 21:14

## 2023-01-01 RX ADMIN — LEVOTHYROXINE SODIUM 200 MCG: 0.03 TABLET ORAL at 09:21

## 2023-01-01 RX ADMIN — SENNOSIDES 8.6 MG: 8.6 TABLET ORAL at 08:32

## 2023-01-01 RX ADMIN — GABAPENTIN 600 MG: 300 CAPSULE ORAL at 07:38

## 2023-01-01 RX ADMIN — ACETAMINOPHEN 650 MG: 325 TABLET, FILM COATED ORAL at 08:08

## 2023-01-01 RX ADMIN — MICONAZOLE NITRATE: 20 POWDER TOPICAL at 20:09

## 2023-01-01 RX ADMIN — INSULIN ASPART 3 UNITS: 100 INJECTION, SOLUTION INTRAVENOUS; SUBCUTANEOUS at 07:02

## 2023-01-01 RX ADMIN — SENNOSIDES 8.6 MG: 8.6 TABLET, COATED ORAL at 09:08

## 2023-01-01 RX ADMIN — INSULIN ASPART 10 UNITS: 100 INJECTION, SOLUTION INTRAVENOUS; SUBCUTANEOUS at 12:58

## 2023-01-01 RX ADMIN — SENNOSIDES 8.6 MG: 8.6 TABLET, COATED ORAL at 08:30

## 2023-01-01 RX ADMIN — LEVOTHYROXINE SODIUM 400 MCG: 0.15 TABLET ORAL at 07:54

## 2023-01-01 RX ADMIN — MICONAZOLE NITRATE: 20 POWDER TOPICAL at 20:17

## 2023-01-01 RX ADMIN — LEVOTHYROXINE SODIUM 200 MCG: 100 TABLET ORAL at 08:12

## 2023-01-01 RX ADMIN — MICONAZOLE NITRATE: 20 POWDER TOPICAL at 22:18

## 2023-01-01 RX ADMIN — UMECLIDINIUM 1 PUFF: 62.5 AEROSOL, POWDER ORAL at 08:28

## 2023-01-01 RX ADMIN — TOPIRAMATE 75 MG: 25 TABLET, FILM COATED ORAL at 22:39

## 2023-01-01 RX ADMIN — ATORVASTATIN CALCIUM 40 MG: 40 TABLET, FILM COATED ORAL at 08:38

## 2023-01-01 RX ADMIN — METHOCARBAMOL 500 MG: 500 TABLET ORAL at 18:26

## 2023-01-01 RX ADMIN — HYDROMORPHONE HYDROCHLORIDE 0.2 MG: 0.2 INJECTION, SOLUTION INTRAMUSCULAR; INTRAVENOUS; SUBCUTANEOUS at 21:40

## 2023-01-01 RX ADMIN — NICOTINE 1 PATCH: 14 PATCH, EXTENDED RELEASE TRANSDERMAL at 07:54

## 2023-01-01 RX ADMIN — METHOCARBAMOL 500 MG: 500 TABLET ORAL at 12:11

## 2023-01-01 RX ADMIN — OXYCODONE HYDROCHLORIDE 5 MG: 5 TABLET ORAL at 09:16

## 2023-01-01 RX ADMIN — HYDRALAZINE HYDROCHLORIDE 10 MG: 10 TABLET ORAL at 08:31

## 2023-01-01 RX ADMIN — HYDRALAZINE HYDROCHLORIDE 25 MG: 25 TABLET, FILM COATED ORAL at 23:57

## 2023-01-01 RX ADMIN — MICONAZOLE NITRATE: 20 POWDER TOPICAL at 09:15

## 2023-01-01 RX ADMIN — NYSTATIN: 100000 CREAM TOPICAL at 19:44

## 2023-01-01 RX ADMIN — GABAPENTIN 600 MG: 300 CAPSULE ORAL at 09:02

## 2023-01-01 RX ADMIN — HYDRALAZINE HYDROCHLORIDE 10 MG: 10 TABLET ORAL at 08:06

## 2023-01-01 RX ADMIN — DILTIAZEM HYDROCHLORIDE 120 MG: 60 CAPSULE, EXTENDED RELEASE ORAL at 09:06

## 2023-01-01 RX ADMIN — OXYCODONE HYDROCHLORIDE AND ACETAMINOPHEN 1 TABLET: 5; 325 TABLET ORAL at 11:48

## 2023-01-01 RX ADMIN — GABAPENTIN 300 MG: 300 CAPSULE ORAL at 13:35

## 2023-01-01 RX ADMIN — INSULIN GLARGINE 70 UNITS: 100 INJECTION, SOLUTION SUBCUTANEOUS at 08:17

## 2023-01-01 RX ADMIN — ACETAMINOPHEN 650 MG: 325 TABLET, FILM COATED ORAL at 10:47

## 2023-01-01 RX ADMIN — APIXABAN 5 MG: 5 TABLET, FILM COATED ORAL at 08:48

## 2023-01-01 RX ADMIN — TOPIRAMATE 25 MG: 25 TABLET, FILM COATED ORAL at 08:05

## 2023-01-01 RX ADMIN — LEVOTHYROXINE SODIUM 200 MCG: 100 TABLET ORAL at 08:10

## 2023-01-01 RX ADMIN — MICONAZOLE NITRATE: 20 CREAM TOPICAL at 20:20

## 2023-01-01 RX ADMIN — TOPIRAMATE 75 MG: 25 TABLET, FILM COATED ORAL at 21:38

## 2023-01-01 RX ADMIN — ACETAMINOPHEN 650 MG: 325 TABLET ORAL at 10:36

## 2023-01-01 RX ADMIN — METOPROLOL SUCCINATE 50 MG: 25 TABLET, EXTENDED RELEASE ORAL at 08:46

## 2023-01-01 RX ADMIN — HYDRALAZINE HYDROCHLORIDE 10 MG: 10 TABLET ORAL at 08:41

## 2023-01-01 RX ADMIN — ACETAMINOPHEN 650 MG: 325 TABLET, FILM COATED ORAL at 12:11

## 2023-01-01 RX ADMIN — ISOSORBIDE DINITRATE 20 MG: 20 TABLET ORAL at 11:23

## 2023-01-01 RX ADMIN — BUMETANIDE 1 MG: 1 TABLET ORAL at 09:33

## 2023-01-01 RX ADMIN — LIDOCAINE PATCH 4% 2 PATCH: 40 PATCH TOPICAL at 07:38

## 2023-01-01 RX ADMIN — NICOTINE 1 PATCH: 14 PATCH, EXTENDED RELEASE TRANSDERMAL at 08:47

## 2023-01-01 RX ADMIN — BUMETANIDE 1 MG: 1 TABLET ORAL at 07:55

## 2023-01-01 RX ADMIN — INSULIN ASPART 5 UNITS: 100 INJECTION, SOLUTION INTRAVENOUS; SUBCUTANEOUS at 08:22

## 2023-01-01 RX ADMIN — APIXABAN 5 MG: 5 TABLET, FILM COATED ORAL at 07:52

## 2023-01-01 RX ADMIN — MICONAZOLE NITRATE: 20 CREAM TOPICAL at 19:43

## 2023-01-01 RX ADMIN — BUMETANIDE 1 MG: 1 TABLET ORAL at 15:25

## 2023-01-01 RX ADMIN — SODIUM ZIRCONIUM CYCLOSILICATE 10 G: 10 POWDER, FOR SUSPENSION ORAL at 14:25

## 2023-01-01 RX ADMIN — GABAPENTIN 600 MG: 300 CAPSULE ORAL at 20:15

## 2023-01-01 RX ADMIN — DIPHENHYDRAMINE HYDROCHLORIDE AND LIDOCAINE HYDROCHLORIDE AND ALUMINUM HYDROXIDE AND MAGNESIUM HYDRO 10 ML: KIT at 18:18

## 2023-01-01 RX ADMIN — TERBINAFINE HYDROCHLORIDE: 1 CREAM TOPICAL at 07:41

## 2023-01-01 RX ADMIN — NICOTINE 1 PATCH: 14 PATCH, EXTENDED RELEASE TRANSDERMAL at 08:22

## 2023-01-01 RX ADMIN — GABAPENTIN 600 MG: 300 CAPSULE ORAL at 13:02

## 2023-01-01 RX ADMIN — INSULIN ASPART 13 UNITS: 100 INJECTION, SOLUTION INTRAVENOUS; SUBCUTANEOUS at 09:10

## 2023-01-01 RX ADMIN — INSULIN ASPART 4 UNITS: 100 INJECTION, SOLUTION INTRAVENOUS; SUBCUTANEOUS at 12:28

## 2023-01-01 RX ADMIN — OXYCODONE HYDROCHLORIDE 5 MG: 5 TABLET ORAL at 14:39

## 2023-01-01 RX ADMIN — ISOSORBIDE DINITRATE 20 MG: 20 TABLET ORAL at 08:21

## 2023-01-01 RX ADMIN — GABAPENTIN 300 MG: 300 CAPSULE ORAL at 13:52

## 2023-01-01 RX ADMIN — SENNOSIDES 8.6 MG: 8.6 TABLET ORAL at 21:28

## 2023-01-01 RX ADMIN — UMECLIDINIUM 1 PUFF: 62.5 AEROSOL, POWDER ORAL at 09:06

## 2023-01-01 RX ADMIN — APIXABAN 5 MG: 5 TABLET, FILM COATED ORAL at 08:49

## 2023-01-01 RX ADMIN — MICONAZOLE NITRATE: 20 CREAM TOPICAL at 19:59

## 2023-01-01 RX ADMIN — LEVOTHYROXINE SODIUM 200 MCG: 100 TABLET ORAL at 09:50

## 2023-01-01 RX ADMIN — BUMETANIDE 4 MG: 2 TABLET ORAL at 08:04

## 2023-01-01 RX ADMIN — LEVOTHYROXINE SODIUM 200 MCG: 100 TABLET ORAL at 08:40

## 2023-01-01 RX ADMIN — MICONAZOLE NITRATE: 20 POWDER TOPICAL at 08:26

## 2023-01-01 RX ADMIN — DICLOFENAC SODIUM 2 G: 10 GEL TOPICAL at 12:09

## 2023-01-01 RX ADMIN — OXYCODONE HYDROCHLORIDE 5 MG: 5 TABLET ORAL at 12:14

## 2023-01-01 RX ADMIN — INSULIN ASPART: 100 INJECTION, SOLUTION INTRAVENOUS; SUBCUTANEOUS at 09:19

## 2023-01-01 RX ADMIN — DOCUSATE SODIUM 50 MG: 50 CAPSULE, LIQUID FILLED ORAL at 08:10

## 2023-01-01 RX ADMIN — DEXTROSE MONOHYDRATE 25 ML: 25 INJECTION, SOLUTION INTRAVENOUS at 01:29

## 2023-01-01 RX ADMIN — INSULIN GLARGINE 40 UNITS: 100 INJECTION, SOLUTION SUBCUTANEOUS at 22:23

## 2023-01-01 RX ADMIN — ACETAMINOPHEN 650 MG: 325 TABLET, FILM COATED ORAL at 05:22

## 2023-01-01 RX ADMIN — GABAPENTIN 300 MG: 300 CAPSULE ORAL at 13:44

## 2023-01-01 RX ADMIN — GABAPENTIN 600 MG: 300 CAPSULE ORAL at 20:35

## 2023-01-01 RX ADMIN — HYDRALAZINE HYDROCHLORIDE 10 MG: 10 TABLET ORAL at 21:19

## 2023-01-01 RX ADMIN — METOPROLOL SUCCINATE 50 MG: 50 TABLET, EXTENDED RELEASE ORAL at 09:08

## 2023-01-01 RX ADMIN — DEXTROSE MONOHYDRATE: 100 INJECTION, SOLUTION INTRAVENOUS at 12:08

## 2023-01-01 RX ADMIN — MICONAZOLE NITRATE: 20 POWDER TOPICAL at 08:04

## 2023-01-01 RX ADMIN — NICOTINE 1 PATCH: 14 PATCH, EXTENDED RELEASE TRANSDERMAL at 23:06

## 2023-01-01 RX ADMIN — ACETAMINOPHEN 325MG 650 MG: 325 TABLET ORAL at 09:06

## 2023-01-01 RX ADMIN — GABAPENTIN 600 MG: 300 CAPSULE ORAL at 07:56

## 2023-01-01 RX ADMIN — NICOTINE 1 PATCH: 14 PATCH, EXTENDED RELEASE TRANSDERMAL at 00:40

## 2023-01-01 RX ADMIN — Medication 1 LOZENGE: at 14:13

## 2023-01-01 RX ADMIN — SODIUM CHLORIDE, PRESERVATIVE FREE 5 ML: 5 INJECTION INTRAVENOUS at 16:29

## 2023-01-01 RX ADMIN — NYSTATIN: 100000 CREAM TOPICAL at 20:20

## 2023-01-01 RX ADMIN — SENNOSIDES 8.6 MG: 8.6 TABLET, COATED ORAL at 19:40

## 2023-01-01 RX ADMIN — APIXABAN 5 MG: 5 TABLET, FILM COATED ORAL at 20:12

## 2023-01-01 RX ADMIN — MAGNESIUM OXIDE TAB 400 MG (240 MG ELEMENTAL MG) 400 MG: 400 (240 MG) TAB at 15:57

## 2023-01-01 RX ADMIN — INSULIN ASPART 9 UNITS: 100 INJECTION, SOLUTION INTRAVENOUS; SUBCUTANEOUS at 08:13

## 2023-01-01 RX ADMIN — INSULIN ASPART 2 UNITS: 100 INJECTION, SOLUTION INTRAVENOUS; SUBCUTANEOUS at 13:27

## 2023-01-01 RX ADMIN — GABAPENTIN 600 MG: 300 CAPSULE ORAL at 19:57

## 2023-01-01 RX ADMIN — Medication 1 LOZENGE: at 11:34

## 2023-01-01 RX ADMIN — METHOCARBAMOL 500 MG: 500 TABLET ORAL at 15:43

## 2023-01-01 RX ADMIN — DILTIAZEM HYDROCHLORIDE 120 MG: 60 CAPSULE, EXTENDED RELEASE ORAL at 19:57

## 2023-01-01 RX ADMIN — SENNOSIDES 8.6 MG: 8.6 TABLET ORAL at 08:21

## 2023-01-01 RX ADMIN — KETOROLAC TROMETHAMINE 1 DROP: 5 SOLUTION OPHTHALMIC at 20:39

## 2023-01-01 RX ADMIN — INSULIN ASPART 11 UNITS: 100 INJECTION, SOLUTION INTRAVENOUS; SUBCUTANEOUS at 09:11

## 2023-01-01 RX ADMIN — ACETAMINOPHEN 650 MG: 325 TABLET, FILM COATED ORAL at 15:44

## 2023-01-01 RX ADMIN — APIXABAN 5 MG: 5 TABLET, FILM COATED ORAL at 07:49

## 2023-01-01 RX ADMIN — ISOSORBIDE DINITRATE 20 MG: 20 TABLET ORAL at 07:52

## 2023-01-01 RX ADMIN — BUMETANIDE 1 MG: 1 TABLET ORAL at 15:15

## 2023-01-01 RX ADMIN — MICONAZOLE NITRATE: 20 CREAM TOPICAL at 09:05

## 2023-01-01 RX ADMIN — INSULIN ASPART 3 UNITS: 100 INJECTION, SOLUTION INTRAVENOUS; SUBCUTANEOUS at 09:06

## 2023-01-01 RX ADMIN — ISOSORBIDE DINITRATE 20 MG: 20 TABLET ORAL at 07:49

## 2023-01-01 RX ADMIN — GABAPENTIN 600 MG: 300 CAPSULE ORAL at 21:39

## 2023-01-01 RX ADMIN — HYDRALAZINE HYDROCHLORIDE 50 MG: 50 TABLET, FILM COATED ORAL at 09:07

## 2023-01-01 RX ADMIN — GABAPENTIN 600 MG: 300 CAPSULE ORAL at 08:41

## 2023-01-01 RX ADMIN — Medication: at 12:20

## 2023-01-01 RX ADMIN — INSULIN ASPART 2 UNITS: 100 INJECTION, SOLUTION INTRAVENOUS; SUBCUTANEOUS at 17:32

## 2023-01-01 RX ADMIN — MICONAZOLE NITRATE: 20 POWDER TOPICAL at 08:34

## 2023-01-01 RX ADMIN — ACETAMINOPHEN 650 MG: 325 TABLET, FILM COATED ORAL at 12:06

## 2023-01-01 RX ADMIN — NICOTINE 1 PATCH: 14 PATCH, EXTENDED RELEASE TRANSDERMAL at 08:21

## 2023-01-01 RX ADMIN — DILTIAZEM HYDROCHLORIDE 120 MG: 60 CAPSULE, EXTENDED RELEASE ORAL at 08:48

## 2023-01-01 RX ADMIN — Medication 1 LOZENGE: at 18:22

## 2023-01-01 RX ADMIN — ATORVASTATIN CALCIUM 40 MG: 40 TABLET, FILM COATED ORAL at 09:08

## 2023-01-01 RX ADMIN — BUMETANIDE 3 MG: 1 TABLET ORAL at 08:09

## 2023-01-01 RX ADMIN — SODIUM CHLORIDE, PRESERVATIVE FREE 5 ML: 5 INJECTION INTRAVENOUS at 17:33

## 2023-01-01 RX ADMIN — GABAPENTIN 600 MG: 300 CAPSULE ORAL at 09:23

## 2023-01-01 RX ADMIN — SENNOSIDES AND DOCUSATE SODIUM 1 TABLET: 50; 8.6 TABLET ORAL at 21:24

## 2023-01-01 RX ADMIN — HYDROXYZINE HYDROCHLORIDE 50 MG: 50 TABLET, FILM COATED ORAL at 20:00

## 2023-01-01 RX ADMIN — TERBINAFINE HYDROCHLORIDE: 1 CREAM TOPICAL at 09:30

## 2023-01-01 RX ADMIN — INSULIN ASPART 14 UNITS: 100 INJECTION, SOLUTION INTRAVENOUS; SUBCUTANEOUS at 12:17

## 2023-01-01 RX ADMIN — MICONAZOLE NITRATE: 20 CREAM TOPICAL at 08:16

## 2023-01-01 RX ADMIN — OXYCODONE HYDROCHLORIDE AND ACETAMINOPHEN 1 TABLET: 5; 325 TABLET ORAL at 19:39

## 2023-01-01 RX ADMIN — POTASSIUM CHLORIDE 20 MEQ: 29.8 INJECTION, SOLUTION INTRAVENOUS at 20:14

## 2023-01-01 RX ADMIN — ACETAMINOPHEN 650 MG: 325 TABLET, FILM COATED ORAL at 20:42

## 2023-01-01 RX ADMIN — GABAPENTIN 600 MG: 300 CAPSULE ORAL at 08:21

## 2023-01-01 RX ADMIN — SENNOSIDES 8.6 MG: 8.6 TABLET, COATED ORAL at 20:59

## 2023-01-01 RX ADMIN — LOPERAMIDE HYDROCHLORIDE 2 MG: 2 CAPSULE ORAL at 04:06

## 2023-01-01 RX ADMIN — BUMETANIDE 3 MG/HR: 0.25 INJECTION, SOLUTION INTRAMUSCULAR; INTRAVENOUS at 02:44

## 2023-01-01 RX ADMIN — GABAPENTIN 600 MG: 300 CAPSULE ORAL at 08:19

## 2023-01-01 RX ADMIN — Medication 12.5 MG: at 20:39

## 2023-01-01 RX ADMIN — HYDRALAZINE HYDROCHLORIDE 10 MG: 10 TABLET, FILM COATED ORAL at 08:47

## 2023-01-01 RX ADMIN — METHOCARBAMOL 500 MG: 500 TABLET ORAL at 00:59

## 2023-01-01 RX ADMIN — MICONAZOLE NITRATE: 20 CREAM TOPICAL at 08:22

## 2023-01-01 RX ADMIN — ONDANSETRON 4 MG: 4 TABLET, ORALLY DISINTEGRATING ORAL at 20:16

## 2023-01-01 RX ADMIN — MICONAZOLE NITRATE: 20 POWDER TOPICAL at 12:12

## 2023-01-01 RX ADMIN — METHOCARBAMOL 500 MG: 500 TABLET ORAL at 15:10

## 2023-01-01 RX ADMIN — Medication: at 13:37

## 2023-01-01 RX ADMIN — SPIRONOLACTONE 50 MG: 50 TABLET ORAL at 07:50

## 2023-01-01 RX ADMIN — APIXABAN 5 MG: 5 TABLET, FILM COATED ORAL at 20:46

## 2023-01-01 RX ADMIN — CIPROFLOXACIN HYDROCHLORIDE 500 MG: 500 TABLET, FILM COATED ORAL at 20:26

## 2023-01-01 RX ADMIN — OXYCODONE HYDROCHLORIDE 5 MG: 5 TABLET ORAL at 08:07

## 2023-01-01 RX ADMIN — INSULIN ASPART 16 UNITS: 100 INJECTION, SOLUTION INTRAVENOUS; SUBCUTANEOUS at 12:49

## 2023-01-01 RX ADMIN — NYSTATIN: 100000 CREAM TOPICAL at 23:55

## 2023-01-01 RX ADMIN — SENNOSIDES 8.6 MG: 8.6 TABLET ORAL at 12:39

## 2023-01-01 RX ADMIN — Medication 1 LOZENGE: at 17:07

## 2023-01-01 RX ADMIN — ATORVASTATIN CALCIUM 40 MG: 40 TABLET, FILM COATED ORAL at 08:09

## 2023-01-01 RX ADMIN — NITROFURANTOIN (MONOHYDRATE/MACROCRYSTALS) 100 MG: 75; 25 CAPSULE ORAL at 08:38

## 2023-01-01 RX ADMIN — APIXABAN 5 MG: 5 TABLET, FILM COATED ORAL at 13:53

## 2023-01-01 RX ADMIN — LIDOCAINE PATCH 4% 2 PATCH: 40 PATCH TOPICAL at 09:02

## 2023-01-01 RX ADMIN — UMECLIDINIUM 1 PUFF: 62.5 AEROSOL, POWDER ORAL at 07:43

## 2023-01-01 RX ADMIN — METHOCARBAMOL 500 MG: 500 TABLET ORAL at 17:36

## 2023-01-01 RX ADMIN — MAGNESIUM OXIDE TAB 400 MG (240 MG ELEMENTAL MG) 400 MG: 400 (240 MG) TAB at 15:02

## 2023-01-01 RX ADMIN — NYSTATIN: 100000 CREAM TOPICAL at 08:08

## 2023-01-01 RX ADMIN — OXYCODONE HYDROCHLORIDE 5 MG: 5 TABLET ORAL at 09:05

## 2023-01-01 RX ADMIN — SENNOSIDES 8.6 MG: 8.6 TABLET ORAL at 08:41

## 2023-01-01 RX ADMIN — DILTIAZEM HYDROCHLORIDE 120 MG: 60 CAPSULE, EXTENDED RELEASE ORAL at 07:56

## 2023-01-01 RX ADMIN — OXYCODONE HYDROCHLORIDE AND ACETAMINOPHEN 1 TABLET: 5; 325 TABLET ORAL at 04:10

## 2023-01-01 RX ADMIN — INSULIN GLARGINE 40 UNITS: 100 INJECTION, SOLUTION SUBCUTANEOUS at 08:18

## 2023-01-01 RX ADMIN — GABAPENTIN 600 MG: 300 CAPSULE ORAL at 21:28

## 2023-01-01 RX ADMIN — APIXABAN 5 MG: 5 TABLET, FILM COATED ORAL at 08:05

## 2023-01-01 RX ADMIN — INSULIN ASPART 6 UNITS: 100 INJECTION, SOLUTION INTRAVENOUS; SUBCUTANEOUS at 08:45

## 2023-01-01 RX ADMIN — SENNOSIDES 8.6 MG: 8.6 TABLET ORAL at 20:41

## 2023-01-01 RX ADMIN — APIXABAN 5 MG: 5 TABLET, FILM COATED ORAL at 19:38

## 2023-01-01 RX ADMIN — UMECLIDINIUM 1 PUFF: 62.5 AEROSOL, POWDER ORAL at 09:03

## 2023-01-01 RX ADMIN — OXYCODONE HYDROCHLORIDE AND ACETAMINOPHEN 1 TABLET: 5; 325 TABLET ORAL at 08:04

## 2023-01-01 RX ADMIN — SENNOSIDES 8.6 MG: 8.6 TABLET, COATED ORAL at 08:47

## 2023-01-01 RX ADMIN — INSULIN ASPART 4 UNITS: 100 INJECTION, SOLUTION INTRAVENOUS; SUBCUTANEOUS at 09:06

## 2023-01-01 RX ADMIN — INSULIN ASPART 4 UNITS: 100 INJECTION, SOLUTION INTRAVENOUS; SUBCUTANEOUS at 16:31

## 2023-01-01 RX ADMIN — HYDRALAZINE HYDROCHLORIDE 10 MG: 10 TABLET ORAL at 20:41

## 2023-01-01 RX ADMIN — POLYETHYLENE GLYCOL 3350 17 G: 17 POWDER, FOR SOLUTION ORAL at 08:40

## 2023-01-01 RX ADMIN — MICONAZOLE NITRATE: 20 CREAM TOPICAL at 20:38

## 2023-01-01 RX ADMIN — CIPROFLOXACIN HYDROCHLORIDE 500 MG: 500 TABLET, FILM COATED ORAL at 20:47

## 2023-01-01 RX ADMIN — NICOTINE 1 PATCH: 14 PATCH, EXTENDED RELEASE TRANSDERMAL at 08:23

## 2023-01-01 RX ADMIN — NYSTATIN: 100000 CREAM TOPICAL at 20:49

## 2023-01-01 RX ADMIN — Medication 1 LOZENGE: at 17:55

## 2023-01-01 RX ADMIN — GABAPENTIN 600 MG: 300 CAPSULE ORAL at 22:33

## 2023-01-01 RX ADMIN — GABAPENTIN 600 MG: 300 CAPSULE ORAL at 20:02

## 2023-01-01 RX ADMIN — BUMETANIDE 1 MG: 1 TABLET ORAL at 08:50

## 2023-01-01 RX ADMIN — MICONAZOLE NITRATE: 20 POWDER TOPICAL at 21:30

## 2023-01-01 RX ADMIN — NYSTATIN: 100000 CREAM TOPICAL at 22:41

## 2023-01-01 RX ADMIN — METHOCARBAMOL 500 MG: 500 TABLET ORAL at 12:22

## 2023-01-01 RX ADMIN — OXYCODONE HYDROCHLORIDE AND ACETAMINOPHEN 1 TABLET: 5; 325 TABLET ORAL at 05:43

## 2023-01-01 RX ADMIN — MICONAZOLE NITRATE: 20 CREAM TOPICAL at 08:13

## 2023-01-01 RX ADMIN — HYDRALAZINE HYDROCHLORIDE 10 MG: 10 TABLET ORAL at 20:15

## 2023-01-01 RX ADMIN — LEVOTHYROXINE SODIUM 200 MCG: 0.03 TABLET ORAL at 10:24

## 2023-01-01 RX ADMIN — MICONAZOLE NITRATE: 20 CREAM TOPICAL at 19:44

## 2023-01-01 RX ADMIN — OXYCODONE HYDROCHLORIDE AND ACETAMINOPHEN 1 TABLET: 5; 325 TABLET ORAL at 15:10

## 2023-01-01 RX ADMIN — GABAPENTIN 600 MG: 300 CAPSULE ORAL at 20:39

## 2023-01-01 RX ADMIN — METHOCARBAMOL 500 MG: 500 TABLET ORAL at 20:43

## 2023-01-01 RX ADMIN — NICOTINE 1 PATCH: 14 PATCH, EXTENDED RELEASE TRANSDERMAL at 00:05

## 2023-01-01 RX ADMIN — APIXABAN 5 MG: 5 TABLET, FILM COATED ORAL at 04:31

## 2023-01-01 RX ADMIN — Medication 1 LOZENGE: at 15:28

## 2023-01-01 RX ADMIN — HYDROXYZINE HYDROCHLORIDE 50 MG: 50 TABLET, FILM COATED ORAL at 22:11

## 2023-01-01 RX ADMIN — BUMETANIDE 3 MG: 1 TABLET ORAL at 15:25

## 2023-01-01 RX ADMIN — GABAPENTIN 600 MG: 300 CAPSULE ORAL at 07:45

## 2023-01-01 RX ADMIN — INSULIN GLARGINE 75 UNITS: 100 INJECTION, SOLUTION SUBCUTANEOUS at 10:19

## 2023-01-01 RX ADMIN — DILTIAZEM HYDROCHLORIDE 120 MG: 60 CAPSULE, EXTENDED RELEASE ORAL at 20:27

## 2023-01-01 RX ADMIN — Medication: at 16:11

## 2023-01-01 RX ADMIN — Medication 1 LOZENGE: at 00:01

## 2023-01-01 RX ADMIN — MICONAZOLE NITRATE: 20 CREAM TOPICAL at 08:17

## 2023-01-01 RX ADMIN — SENNOSIDES 8.6 MG: 8.6 TABLET, COATED ORAL at 08:07

## 2023-01-01 RX ADMIN — GABAPENTIN 600 MG: 300 CAPSULE ORAL at 20:37

## 2023-01-01 RX ADMIN — Medication: at 12:38

## 2023-01-01 RX ADMIN — HYDROMORPHONE HYDROCHLORIDE 0.2 MG: 0.2 INJECTION, SOLUTION INTRAMUSCULAR; INTRAVENOUS; SUBCUTANEOUS at 13:12

## 2023-01-01 RX ADMIN — SENNOSIDES 8.6 MG: 8.6 TABLET, COATED ORAL at 20:12

## 2023-01-01 RX ADMIN — HYDRALAZINE HYDROCHLORIDE 10 MG: 10 TABLET ORAL at 15:25

## 2023-01-01 RX ADMIN — INSULIN ASPART 15 UNITS: 100 INJECTION, SOLUTION INTRAVENOUS; SUBCUTANEOUS at 09:05

## 2023-01-01 RX ADMIN — Medication 1 LOZENGE: at 16:19

## 2023-01-01 RX ADMIN — BUMETANIDE 1 MG: 1 TABLET ORAL at 08:05

## 2023-01-01 RX ADMIN — NICOTINE 1 PATCH: 14 PATCH, EXTENDED RELEASE TRANSDERMAL at 08:53

## 2023-01-01 RX ADMIN — DICLOFENAC SODIUM 2 G: 10 GEL TOPICAL at 08:02

## 2023-01-01 RX ADMIN — OXYCODONE HYDROCHLORIDE 5 MG: 5 TABLET ORAL at 08:19

## 2023-01-01 RX ADMIN — TERBINAFINE HYDROCHLORIDE: 1 CREAM TOPICAL at 07:49

## 2023-01-01 RX ADMIN — SODIUM ZIRCONIUM CYCLOSILICATE 10 G: 10 POWDER, FOR SUSPENSION ORAL at 10:10

## 2023-01-01 RX ADMIN — MICONAZOLE NITRATE: 20 POWDER TOPICAL at 08:24

## 2023-01-01 RX ADMIN — SENNOSIDES 8.6 MG: 8.6 TABLET, COATED ORAL at 19:43

## 2023-01-01 RX ADMIN — GABAPENTIN 600 MG: 300 CAPSULE ORAL at 19:53

## 2023-01-01 RX ADMIN — INSULIN ASPART 2 UNITS: 100 INJECTION, SOLUTION INTRAVENOUS; SUBCUTANEOUS at 08:08

## 2023-01-01 RX ADMIN — Medication: at 20:47

## 2023-01-01 RX ADMIN — NICOTINE 1 PATCH: 14 PATCH, EXTENDED RELEASE TRANSDERMAL at 23:03

## 2023-01-01 RX ADMIN — GABAPENTIN 600 MG: 300 CAPSULE ORAL at 21:29

## 2023-01-01 RX ADMIN — DILTIAZEM HYDROCHLORIDE 120 MG: 120 CAPSULE, COATED, EXTENDED RELEASE ORAL at 07:39

## 2023-01-01 RX ADMIN — UMECLIDINIUM 1 PUFF: 62.5 AEROSOL, POWDER ORAL at 08:55

## 2023-01-01 RX ADMIN — HYDRALAZINE HYDROCHLORIDE 10 MG: 10 TABLET ORAL at 14:02

## 2023-01-01 RX ADMIN — Medication 1 LOZENGE: at 09:37

## 2023-01-01 RX ADMIN — MICONAZOLE NITRATE: 20 POWDER TOPICAL at 08:42

## 2023-01-01 RX ADMIN — GABAPENTIN 600 MG: 300 CAPSULE ORAL at 08:34

## 2023-01-01 RX ADMIN — INSULIN ASPART 6 UNITS: 100 INJECTION, SOLUTION INTRAVENOUS; SUBCUTANEOUS at 12:46

## 2023-01-01 RX ADMIN — APIXABAN 5 MG: 5 TABLET, FILM COATED ORAL at 21:19

## 2023-01-01 RX ADMIN — HYDROXYZINE HYDROCHLORIDE 50 MG: 50 TABLET, FILM COATED ORAL at 16:47

## 2023-01-01 RX ADMIN — Medication 1 LOZENGE: at 22:09

## 2023-01-01 RX ADMIN — HYDROXYZINE HYDROCHLORIDE 50 MG: 50 TABLET, FILM COATED ORAL at 10:36

## 2023-01-01 RX ADMIN — ACETAMINOPHEN 650 MG: 325 TABLET ORAL at 16:09

## 2023-01-01 RX ADMIN — SPIRONOLACTONE 50 MG: 50 TABLET ORAL at 09:10

## 2023-01-01 RX ADMIN — OXYCODONE HYDROCHLORIDE 5 MG: 5 TABLET ORAL at 16:02

## 2023-01-01 RX ADMIN — NICOTINE 1 PATCH: 14 PATCH, EXTENDED RELEASE TRANSDERMAL at 08:35

## 2023-01-01 RX ADMIN — ACETAMINOPHEN 650 MG: 325 TABLET, FILM COATED ORAL at 00:03

## 2023-01-01 RX ADMIN — METOPROLOL SUCCINATE 75 MG: 50 TABLET, EXTENDED RELEASE ORAL at 08:04

## 2023-01-01 RX ADMIN — SENNOSIDES 8.6 MG: 8.6 TABLET ORAL at 08:26

## 2023-01-01 RX ADMIN — DILTIAZEM HYDROCHLORIDE 120 MG: 60 CAPSULE, EXTENDED RELEASE ORAL at 20:47

## 2023-01-01 RX ADMIN — OXYCODONE HYDROCHLORIDE AND ACETAMINOPHEN 1 TABLET: 5; 325 TABLET ORAL at 03:53

## 2023-01-01 RX ADMIN — CIPROFLOXACIN HYDROCHLORIDE 500 MG: 500 TABLET, FILM COATED ORAL at 21:24

## 2023-01-01 RX ADMIN — GABAPENTIN 600 MG: 300 CAPSULE ORAL at 07:58

## 2023-01-01 RX ADMIN — Medication: at 08:40

## 2023-01-01 RX ADMIN — LIDOCAINE PATCH 4% 2 PATCH: 40 PATCH TOPICAL at 08:12

## 2023-01-01 RX ADMIN — INSULIN ASPART 11 UNITS: 100 INJECTION, SOLUTION INTRAVENOUS; SUBCUTANEOUS at 12:48

## 2023-01-01 RX ADMIN — UMECLIDINIUM 1 PUFF: 62.5 AEROSOL, POWDER ORAL at 08:18

## 2023-01-01 RX ADMIN — MICONAZOLE NITRATE: 20 CREAM TOPICAL at 08:59

## 2023-01-01 RX ADMIN — GABAPENTIN 600 MG: 300 CAPSULE ORAL at 20:46

## 2023-01-01 RX ADMIN — SODIUM ZIRCONIUM CYCLOSILICATE 15 G: 10 POWDER, FOR SUSPENSION ORAL at 22:50

## 2023-01-01 RX ADMIN — SPIRONOLACTONE 50 MG: 50 TABLET ORAL at 07:49

## 2023-01-01 RX ADMIN — Medication 12.5 MG: at 08:04

## 2023-01-01 RX ADMIN — DILTIAZEM HYDROCHLORIDE 120 MG: 60 CAPSULE, EXTENDED RELEASE ORAL at 20:19

## 2023-01-01 RX ADMIN — TERBINAFINE HYDROCHLORIDE: 1 CREAM TOPICAL at 07:52

## 2023-01-01 RX ADMIN — Medication 1 LOZENGE: at 09:20

## 2023-01-01 RX ADMIN — SENNOSIDES 8.6 MG: 8.6 TABLET, COATED ORAL at 08:15

## 2023-01-01 RX ADMIN — DILTIAZEM HYDROCHLORIDE 120 MG: 60 CAPSULE, EXTENDED RELEASE ORAL at 20:40

## 2023-01-01 RX ADMIN — MICONAZOLE NITRATE: 20 CREAM TOPICAL at 20:16

## 2023-01-01 RX ADMIN — APIXABAN 5 MG: 5 TABLET, FILM COATED ORAL at 19:49

## 2023-01-01 RX ADMIN — METHOCARBAMOL 500 MG: 500 TABLET ORAL at 19:53

## 2023-01-01 RX ADMIN — INSULIN ASPART 6 UNITS: 100 INJECTION, SOLUTION INTRAVENOUS; SUBCUTANEOUS at 12:49

## 2023-01-01 RX ADMIN — INSULIN GLARGINE 65 UNITS: 300 INJECTION, SOLUTION SUBCUTANEOUS at 09:34

## 2023-01-01 RX ADMIN — SENNOSIDES 8.6 MG: 8.6 TABLET, COATED ORAL at 22:33

## 2023-01-01 RX ADMIN — Medication 1 LOZENGE: at 12:25

## 2023-01-01 RX ADMIN — INSULIN GLARGINE 70 UNITS: 100 INJECTION, SOLUTION SUBCUTANEOUS at 09:05

## 2023-01-01 RX ADMIN — OXYCODONE HYDROCHLORIDE 5 MG: 5 TABLET ORAL at 16:19

## 2023-01-01 RX ADMIN — LEVOTHYROXINE SODIUM 200 MCG: 0.03 TABLET ORAL at 10:19

## 2023-01-01 RX ADMIN — MICONAZOLE NITRATE: 20 CREAM TOPICAL at 20:39

## 2023-01-01 RX ADMIN — HUMAN INSULIN 10 UNITS: 100 INJECTION, SOLUTION SUBCUTANEOUS at 15:59

## 2023-01-01 RX ADMIN — SENNOSIDES 8.6 MG: 8.6 TABLET, COATED ORAL at 20:19

## 2023-01-01 RX ADMIN — INSULIN ASPART 1 UNITS: 100 INJECTION, SOLUTION INTRAVENOUS; SUBCUTANEOUS at 17:39

## 2023-01-01 RX ADMIN — INSULIN ASPART 10 UNITS: 100 INJECTION, SOLUTION INTRAVENOUS; SUBCUTANEOUS at 07:42

## 2023-01-01 RX ADMIN — BUMETANIDE 1 MG: 1 TABLET ORAL at 08:48

## 2023-01-01 RX ADMIN — APIXABAN 5 MG: 5 TABLET, FILM COATED ORAL at 07:38

## 2023-01-01 RX ADMIN — DILTIAZEM HYDROCHLORIDE 120 MG: 120 CAPSULE, COATED, EXTENDED RELEASE ORAL at 19:43

## 2023-01-01 RX ADMIN — INSULIN ASPART 1 UNITS: 100 INJECTION, SOLUTION INTRAVENOUS; SUBCUTANEOUS at 14:10

## 2023-01-01 RX ADMIN — BUMETANIDE 1 MG: 1 TABLET ORAL at 08:21

## 2023-01-01 RX ADMIN — HYDROMORPHONE HYDROCHLORIDE 0.2 MG: 0.2 INJECTION, SOLUTION INTRAMUSCULAR; INTRAVENOUS; SUBCUTANEOUS at 14:28

## 2023-01-01 RX ADMIN — LIDOCAINE PATCH 4% 2 PATCH: 40 PATCH TOPICAL at 08:18

## 2023-01-01 RX ADMIN — DOCUSATE SODIUM 50 MG: 50 CAPSULE, LIQUID FILLED ORAL at 08:26

## 2023-01-01 RX ADMIN — BUMETANIDE 1 MG: 1 TABLET ORAL at 09:55

## 2023-01-01 RX ADMIN — GABAPENTIN 600 MG: 300 CAPSULE ORAL at 20:45

## 2023-01-01 RX ADMIN — Medication 1 LOZENGE: at 08:49

## 2023-01-01 RX ADMIN — APIXABAN 5 MG: 5 TABLET, FILM COATED ORAL at 09:22

## 2023-01-01 RX ADMIN — OXYCODONE HYDROCHLORIDE 5 MG: 5 TABLET ORAL at 21:33

## 2023-01-01 RX ADMIN — INSULIN ASPART 13 UNITS: 100 INJECTION, SOLUTION INTRAVENOUS; SUBCUTANEOUS at 12:37

## 2023-01-01 RX ADMIN — APIXABAN 5 MG: 5 TABLET, FILM COATED ORAL at 08:15

## 2023-01-01 RX ADMIN — SODIUM CHLORIDE, PRESERVATIVE FREE 5 ML: 5 INJECTION INTRAVENOUS at 18:01

## 2023-01-01 RX ADMIN — Medication 5 ML: at 16:15

## 2023-01-01 RX ADMIN — UMECLIDINIUM 1 PUFF: 62.5 AEROSOL, POWDER ORAL at 08:10

## 2023-01-01 RX ADMIN — ATORVASTATIN CALCIUM 40 MG: 40 TABLET, FILM COATED ORAL at 07:49

## 2023-01-01 RX ADMIN — INSULIN ASPART 15 UNITS: 100 INJECTION, SOLUTION INTRAVENOUS; SUBCUTANEOUS at 12:17

## 2023-01-01 RX ADMIN — BUMETANIDE 2 MG: 0.25 INJECTION INTRAMUSCULAR; INTRAVENOUS at 15:21

## 2023-01-01 RX ADMIN — Medication: at 20:28

## 2023-01-01 RX ADMIN — HYDRALAZINE HYDROCHLORIDE 10 MG: 10 TABLET ORAL at 13:23

## 2023-01-01 RX ADMIN — APIXABAN 5 MG: 5 TABLET, FILM COATED ORAL at 21:12

## 2023-01-01 RX ADMIN — DICLOFENAC SODIUM 2 G: 10 GEL TOPICAL at 11:44

## 2023-01-01 RX ADMIN — SODIUM CHLORIDE, PRESERVATIVE FREE 5 ML: 5 INJECTION INTRAVENOUS at 16:04

## 2023-01-01 RX ADMIN — DOCUSATE SODIUM 50 MG: 50 CAPSULE, LIQUID FILLED ORAL at 20:15

## 2023-01-01 RX ADMIN — MICONAZOLE NITRATE: 20 CREAM TOPICAL at 23:20

## 2023-01-01 RX ADMIN — MAGNESIUM OXIDE TAB 400 MG (241.3 MG ELEMENTAL MG) 400 MG: 400 (241.3 MG) TAB at 20:35

## 2023-01-01 RX ADMIN — DOCUSATE SODIUM 50 MG: 50 CAPSULE, LIQUID FILLED ORAL at 08:45

## 2023-01-01 RX ADMIN — MICONAZOLE NITRATE: 20 POWDER TOPICAL at 07:51

## 2023-01-01 RX ADMIN — BUMETANIDE 1 MG: 1 TABLET ORAL at 15:57

## 2023-01-01 RX ADMIN — CIPROFLOXACIN HYDROCHLORIDE 500 MG: 500 TABLET, FILM COATED ORAL at 08:31

## 2023-01-01 RX ADMIN — DILTIAZEM HYDROCHLORIDE 120 MG: 60 CAPSULE, EXTENDED RELEASE ORAL at 09:22

## 2023-01-01 RX ADMIN — APIXABAN 5 MG: 5 TABLET, FILM COATED ORAL at 08:41

## 2023-01-01 RX ADMIN — LIDOCAINE PATCH 4% 2 PATCH: 40 PATCH TOPICAL at 14:03

## 2023-01-01 RX ADMIN — MICONAZOLE NITRATE: 20 CREAM TOPICAL at 09:11

## 2023-01-01 RX ADMIN — BUMETANIDE 3 MG: 1 TABLET ORAL at 09:01

## 2023-01-01 RX ADMIN — HYDRALAZINE HYDROCHLORIDE 10 MG: 10 TABLET ORAL at 20:27

## 2023-01-01 RX ADMIN — METOPROLOL SUCCINATE 50 MG: 50 TABLET, EXTENDED RELEASE ORAL at 09:03

## 2023-01-01 RX ADMIN — BUMETANIDE 5 MG: 0.25 INJECTION INTRAMUSCULAR; INTRAVENOUS at 21:37

## 2023-01-01 RX ADMIN — UMECLIDINIUM 1 PUFF: 62.5 AEROSOL, POWDER ORAL at 08:22

## 2023-01-01 RX ADMIN — KETOROLAC TROMETHAMINE 1 DROP: 5 SOLUTION OPHTHALMIC at 09:20

## 2023-01-01 RX ADMIN — ATORVASTATIN CALCIUM 40 MG: 40 TABLET, FILM COATED ORAL at 07:51

## 2023-01-01 RX ADMIN — INSULIN ASPART 5 UNITS: 100 INJECTION, SOLUTION INTRAVENOUS; SUBCUTANEOUS at 18:16

## 2023-01-01 RX ADMIN — ATORVASTATIN CALCIUM 40 MG: 40 TABLET, FILM COATED ORAL at 08:10

## 2023-01-01 RX ADMIN — METOPROLOL SUCCINATE 50 MG: 50 TABLET, EXTENDED RELEASE ORAL at 10:10

## 2023-01-01 RX ADMIN — LEVOTHYROXINE SODIUM 200 MCG: 0.03 TABLET ORAL at 10:57

## 2023-01-01 RX ADMIN — Medication 1 LOZENGE: at 23:46

## 2023-01-01 RX ADMIN — Medication 1 LOZENGE: at 20:35

## 2023-01-01 RX ADMIN — APIXABAN 5 MG: 5 TABLET, FILM COATED ORAL at 19:40

## 2023-01-01 RX ADMIN — BUMETANIDE 3 MG/HR: 0.25 INJECTION, SOLUTION INTRAMUSCULAR; INTRAVENOUS at 07:02

## 2023-01-01 RX ADMIN — TOPIRAMATE 75 MG: 25 TABLET, FILM COATED ORAL at 22:23

## 2023-01-01 RX ADMIN — INSULIN GLARGINE 65 UNITS: 300 INJECTION, SOLUTION SUBCUTANEOUS at 09:17

## 2023-01-01 RX ADMIN — NYSTATIN: 100000 CREAM TOPICAL at 07:52

## 2023-01-01 RX ADMIN — BUMETANIDE 1 MG: 1 TABLET ORAL at 16:11

## 2023-01-01 RX ADMIN — INSULIN ASPART 10 UNITS: 100 INJECTION, SOLUTION INTRAVENOUS; SUBCUTANEOUS at 08:24

## 2023-01-01 RX ADMIN — KETOROLAC TROMETHAMINE 1 DROP: 5 SOLUTION OPHTHALMIC at 08:14

## 2023-01-01 RX ADMIN — NITROFURANTOIN (MONOHYDRATE/MACROCRYSTALS) 100 MG: 75; 25 CAPSULE ORAL at 09:02

## 2023-01-01 RX ADMIN — ACETAMINOPHEN 325MG 650 MG: 325 TABLET ORAL at 02:17

## 2023-01-01 RX ADMIN — OXYCODONE HYDROCHLORIDE AND ACETAMINOPHEN 1 TABLET: 5; 325 TABLET ORAL at 15:39

## 2023-01-01 RX ADMIN — MAGNESIUM OXIDE TAB 400 MG (240 MG ELEMENTAL MG) 400 MG: 400 (240 MG) TAB at 12:01

## 2023-01-01 RX ADMIN — Medication 1 LOZENGE: at 08:31

## 2023-01-01 RX ADMIN — Medication 1 LOZENGE: at 19:35

## 2023-01-01 RX ADMIN — INSULIN GLARGINE 65 UNITS: 100 INJECTION, SOLUTION SUBCUTANEOUS at 07:49

## 2023-01-01 RX ADMIN — BUMETANIDE 4 MG: 0.25 INJECTION INTRAMUSCULAR; INTRAVENOUS at 19:56

## 2023-01-01 RX ADMIN — OXYCODONE HYDROCHLORIDE AND ACETAMINOPHEN 1 TABLET: 5; 325 TABLET ORAL at 21:04

## 2023-01-01 RX ADMIN — LEVOTHYROXINE SODIUM 200 MCG: 100 TABLET ORAL at 08:32

## 2023-01-01 RX ADMIN — UMECLIDINIUM 1 PUFF: 62.5 AEROSOL, POWDER ORAL at 07:55

## 2023-01-01 RX ADMIN — INSULIN ASPART 10 UNITS: 100 INJECTION, SOLUTION INTRAVENOUS; SUBCUTANEOUS at 08:20

## 2023-01-01 RX ADMIN — DILTIAZEM HYDROCHLORIDE 120 MG: 60 CAPSULE, EXTENDED RELEASE ORAL at 20:23

## 2023-01-01 RX ADMIN — SENNOSIDES 8.6 MG: 8.6 TABLET, COATED ORAL at 20:39

## 2023-01-01 RX ADMIN — Medication 12.5 MG: at 22:24

## 2023-01-01 RX ADMIN — DILTIAZEM HYDROCHLORIDE 120 MG: 60 CAPSULE, EXTENDED RELEASE ORAL at 22:09

## 2023-01-01 RX ADMIN — SODIUM CHLORIDE, PRESERVATIVE FREE 15 ML: 5 INJECTION INTRAVENOUS at 16:16

## 2023-01-01 RX ADMIN — ACETAMINOPHEN 325MG 650 MG: 325 TABLET ORAL at 09:05

## 2023-01-01 RX ADMIN — LEVOTHYROXINE SODIUM 200 MCG: 100 TABLET ORAL at 08:04

## 2023-01-01 RX ADMIN — NYSTATIN: 100000 CREAM TOPICAL at 08:59

## 2023-01-01 RX ADMIN — BUMETANIDE 1 MG: 1 TABLET ORAL at 09:07

## 2023-01-01 RX ADMIN — LEVOTHYROXINE SODIUM 200 MCG: 0.03 TABLET ORAL at 10:45

## 2023-01-01 RX ADMIN — METOPROLOL SUCCINATE 25 MG: 25 TABLET, EXTENDED RELEASE ORAL at 07:38

## 2023-01-01 RX ADMIN — MAGNESIUM OXIDE TAB 400 MG (240 MG ELEMENTAL MG) 400 MG: 400 (240 MG) TAB at 08:26

## 2023-01-01 RX ADMIN — ACETAMINOPHEN 650 MG: 325 TABLET, FILM COATED ORAL at 08:41

## 2023-01-01 RX ADMIN — GABAPENTIN 600 MG: 300 CAPSULE ORAL at 13:54

## 2023-01-01 RX ADMIN — HYDRALAZINE HYDROCHLORIDE 10 MG: 10 TABLET, FILM COATED ORAL at 20:15

## 2023-01-01 RX ADMIN — INSULIN ASPART 6 UNITS: 100 INJECTION, SOLUTION INTRAVENOUS; SUBCUTANEOUS at 07:40

## 2023-01-01 RX ADMIN — APIXABAN 5 MG: 5 TABLET, FILM COATED ORAL at 20:38

## 2023-01-01 RX ADMIN — BUMETANIDE 3 MG: 1 TABLET ORAL at 16:14

## 2023-01-01 RX ADMIN — UMECLIDINIUM 1 PUFF: 62.5 AEROSOL, POWDER ORAL at 08:32

## 2023-01-01 RX ADMIN — BUMETANIDE 1 MG: 1 TABLET ORAL at 15:54

## 2023-01-01 RX ADMIN — MICONAZOLE NITRATE: 20 CREAM TOPICAL at 20:50

## 2023-01-01 RX ADMIN — ACETAMINOPHEN 325MG 650 MG: 325 TABLET ORAL at 08:39

## 2023-01-01 RX ADMIN — UMECLIDINIUM 1 PUFF: 62.5 AEROSOL, POWDER ORAL at 08:27

## 2023-01-01 RX ADMIN — SENNOSIDES 8.6 MG: 8.6 TABLET, COATED ORAL at 20:08

## 2023-01-01 RX ADMIN — ACETAMINOPHEN 325MG 650 MG: 325 TABLET ORAL at 00:59

## 2023-01-01 RX ADMIN — OXYCODONE HYDROCHLORIDE 5 MG: 5 TABLET ORAL at 08:39

## 2023-01-01 RX ADMIN — HYDROXYZINE HYDROCHLORIDE 50 MG: 25 TABLET, FILM COATED ORAL at 08:38

## 2023-01-01 RX ADMIN — GABAPENTIN 600 MG: 300 CAPSULE ORAL at 08:52

## 2023-01-01 RX ADMIN — METOPROLOL SUCCINATE 50 MG: 50 TABLET, EXTENDED RELEASE ORAL at 08:10

## 2023-01-01 RX ADMIN — DILTIAZEM HYDROCHLORIDE 180 MG: 180 CAPSULE, COATED, EXTENDED RELEASE ORAL at 08:21

## 2023-01-01 RX ADMIN — ACETAZOLAMIDE SODIUM 500 MG: 500 INJECTION, POWDER, LYOPHILIZED, FOR SOLUTION INTRAVENOUS at 16:16

## 2023-01-01 RX ADMIN — Medication 1 LOZENGE: at 08:58

## 2023-01-01 RX ADMIN — APIXABAN 5 MG: 5 TABLET, FILM COATED ORAL at 07:47

## 2023-01-01 RX ADMIN — Medication 1 LOZENGE: at 08:07

## 2023-01-01 RX ADMIN — INSULIN ASPART 2 UNITS: 100 INJECTION, SOLUTION INTRAVENOUS; SUBCUTANEOUS at 17:24

## 2023-01-01 RX ADMIN — ISOSORBIDE DINITRATE 20 MG: 20 TABLET ORAL at 11:00

## 2023-01-01 RX ADMIN — Medication 1 LOZENGE: at 08:12

## 2023-01-01 RX ADMIN — LEVOTHYROXINE SODIUM 200 MCG: 100 TABLET ORAL at 08:48

## 2023-01-01 RX ADMIN — HYDRALAZINE HYDROCHLORIDE 10 MG: 10 TABLET ORAL at 07:57

## 2023-01-01 RX ADMIN — INSULIN ASPART 5 UNITS: 100 INJECTION, SOLUTION INTRAVENOUS; SUBCUTANEOUS at 17:45

## 2023-01-01 RX ADMIN — DILTIAZEM HYDROCHLORIDE 120 MG: 60 CAPSULE, EXTENDED RELEASE ORAL at 20:41

## 2023-01-01 RX ADMIN — NICOTINE 1 PATCH: 14 PATCH, EXTENDED RELEASE TRANSDERMAL at 07:56

## 2023-01-01 RX ADMIN — ACETAMINOPHEN 650 MG: 325 TABLET, FILM COATED ORAL at 23:46

## 2023-01-01 RX ADMIN — UMECLIDINIUM 1 PUFF: 62.5 AEROSOL, POWDER ORAL at 09:05

## 2023-01-01 RX ADMIN — HYDRALAZINE HYDROCHLORIDE 10 MG: 10 TABLET ORAL at 09:07

## 2023-01-01 RX ADMIN — METHOCARBAMOL 500 MG: 500 TABLET ORAL at 12:00

## 2023-01-01 RX ADMIN — ACETAMINOPHEN 650 MG: 325 TABLET, FILM COATED ORAL at 14:27

## 2023-01-01 RX ADMIN — HYDRALAZINE HYDROCHLORIDE 10 MG: 10 TABLET, FILM COATED ORAL at 14:16

## 2023-01-01 RX ADMIN — DILTIAZEM HYDROCHLORIDE 180 MG: 180 CAPSULE, COATED, EXTENDED RELEASE ORAL at 07:38

## 2023-01-01 RX ADMIN — GABAPENTIN 600 MG: 300 CAPSULE ORAL at 07:52

## 2023-01-01 RX ADMIN — INSULIN ASPART 4 UNITS: 100 INJECTION, SOLUTION INTRAVENOUS; SUBCUTANEOUS at 08:14

## 2023-01-01 RX ADMIN — LEVOTHYROXINE SODIUM 200 MCG: 100 TABLET ORAL at 09:15

## 2023-01-01 RX ADMIN — LEVOTHYROXINE SODIUM 200 MCG: 100 TABLET ORAL at 08:50

## 2023-01-01 RX ADMIN — DILTIAZEM HYDROCHLORIDE 180 MG: 180 CAPSULE, COATED, EXTENDED RELEASE ORAL at 20:44

## 2023-01-01 RX ADMIN — DILTIAZEM HYDROCHLORIDE 180 MG: 180 CAPSULE, COATED, EXTENDED RELEASE ORAL at 20:47

## 2023-01-01 RX ADMIN — MAGNESIUM OXIDE TAB 400 MG (241.3 MG ELEMENTAL MG) 400 MG: 400 (241.3 MG) TAB at 11:48

## 2023-01-01 RX ADMIN — DILTIAZEM HYDROCHLORIDE 180 MG: 180 CAPSULE, COATED, EXTENDED RELEASE ORAL at 09:12

## 2023-01-01 RX ADMIN — INSULIN ASPART 4 UNITS: 100 INJECTION, SOLUTION INTRAVENOUS; SUBCUTANEOUS at 13:00

## 2023-01-01 RX ADMIN — GABAPENTIN 600 MG: 300 CAPSULE ORAL at 13:59

## 2023-01-01 RX ADMIN — INSULIN ASPART 16 UNITS: 100 INJECTION, SOLUTION INTRAVENOUS; SUBCUTANEOUS at 12:29

## 2023-01-01 RX ADMIN — SENNOSIDES 8.6 MG: 8.6 TABLET, COATED ORAL at 07:56

## 2023-01-01 RX ADMIN — Medication: at 20:24

## 2023-01-01 RX ADMIN — GABAPENTIN 600 MG: 300 CAPSULE ORAL at 22:08

## 2023-01-01 RX ADMIN — DILTIAZEM HYDROCHLORIDE 120 MG: 120 CAPSULE, COATED, EXTENDED RELEASE ORAL at 07:47

## 2023-01-01 RX ADMIN — ACETAZOLAMIDE 500 MG: 500 INJECTION, POWDER, LYOPHILIZED, FOR SOLUTION INTRAVENOUS at 12:46

## 2023-01-01 RX ADMIN — KETOROLAC TROMETHAMINE 1 DROP: 5 SOLUTION OPHTHALMIC at 12:47

## 2023-01-01 RX ADMIN — NYSTATIN: 100000 CREAM TOPICAL at 08:16

## 2023-01-01 RX ADMIN — METHOCARBAMOL 500 MG: 500 TABLET ORAL at 03:53

## 2023-01-01 RX ADMIN — DILTIAZEM HYDROCHLORIDE 120 MG: 60 CAPSULE, EXTENDED RELEASE ORAL at 09:09

## 2023-01-01 RX ADMIN — Medication 1 LOZENGE: at 21:37

## 2023-01-01 RX ADMIN — GABAPENTIN 600 MG: 300 CAPSULE ORAL at 09:08

## 2023-01-01 RX ADMIN — GABAPENTIN 600 MG: 300 CAPSULE ORAL at 12:47

## 2023-01-01 RX ADMIN — METHOCARBAMOL 500 MG: 500 TABLET ORAL at 21:57

## 2023-01-01 RX ADMIN — CIPROFLOXACIN HYDROCHLORIDE 500 MG: 500 TABLET, FILM COATED ORAL at 20:15

## 2023-01-01 RX ADMIN — INSULIN GLARGINE 47 UNITS: 100 INJECTION, SOLUTION SUBCUTANEOUS at 09:05

## 2023-01-01 RX ADMIN — GABAPENTIN 600 MG: 300 CAPSULE ORAL at 08:31

## 2023-01-01 RX ADMIN — MAGNESIUM SULFATE HEPTAHYDRATE 4 G: 40 INJECTION, SOLUTION INTRAVENOUS at 16:19

## 2023-01-01 RX ADMIN — ATORVASTATIN CALCIUM 40 MG: 40 TABLET, FILM COATED ORAL at 08:52

## 2023-01-01 RX ADMIN — INSULIN ASPART 15 UNITS: 100 INJECTION, SOLUTION INTRAVENOUS; SUBCUTANEOUS at 12:38

## 2023-01-01 RX ADMIN — Medication: at 08:26

## 2023-01-01 RX ADMIN — NYSTATIN: 100000 CREAM TOPICAL at 19:48

## 2023-01-01 RX ADMIN — HUMAN INSULIN 3.5 UNITS/HR: 100 INJECTION, SOLUTION SUBCUTANEOUS at 16:32

## 2023-01-01 RX ADMIN — NYSTATIN: 100000 CREAM TOPICAL at 07:40

## 2023-01-01 RX ADMIN — MICONAZOLE NITRATE: 20 CREAM TOPICAL at 19:37

## 2023-01-01 RX ADMIN — OXYCODONE HYDROCHLORIDE AND ACETAMINOPHEN 1 TABLET: 5; 325 TABLET ORAL at 12:55

## 2023-01-01 RX ADMIN — GABAPENTIN 600 MG: 300 CAPSULE ORAL at 13:06

## 2023-01-01 RX ADMIN — METHOCARBAMOL 500 MG: 500 TABLET ORAL at 11:48

## 2023-01-01 RX ADMIN — OXYCODONE HYDROCHLORIDE AND ACETAMINOPHEN 1 TABLET: 5; 325 TABLET ORAL at 12:39

## 2023-01-01 RX ADMIN — SIMETHICONE 80 MG: 80 TABLET, CHEWABLE ORAL at 17:55

## 2023-01-01 RX ADMIN — INSULIN ASPART 9 UNITS: 100 INJECTION, SOLUTION INTRAVENOUS; SUBCUTANEOUS at 16:09

## 2023-01-01 RX ADMIN — ACETAMINOPHEN 975 MG: 325 TABLET, FILM COATED ORAL at 13:14

## 2023-01-01 RX ADMIN — APIXABAN 5 MG: 5 TABLET, FILM COATED ORAL at 20:37

## 2023-01-01 RX ADMIN — APIXABAN 5 MG: 5 TABLET, FILM COATED ORAL at 22:18

## 2023-01-01 RX ADMIN — OXYCODONE HYDROCHLORIDE AND ACETAMINOPHEN 1 TABLET: 5; 325 TABLET ORAL at 10:29

## 2023-01-01 RX ADMIN — LEVOTHYROXINE SODIUM 200 MCG: 0.03 TABLET ORAL at 10:41

## 2023-01-01 RX ADMIN — DEXTROSE MONOHYDRATE 25 G: 25 INJECTION, SOLUTION INTRAVENOUS at 00:35

## 2023-01-01 RX ADMIN — ACETAMINOPHEN 325MG 650 MG: 325 TABLET ORAL at 18:03

## 2023-01-01 RX ADMIN — HYDROMORPHONE HYDROCHLORIDE 0.2 MG: 0.2 INJECTION, SOLUTION INTRAMUSCULAR; INTRAVENOUS; SUBCUTANEOUS at 09:03

## 2023-01-01 RX ADMIN — Medication 1 LOZENGE: at 20:23

## 2023-01-01 RX ADMIN — METOPROLOL SUCCINATE 50 MG: 25 TABLET, EXTENDED RELEASE ORAL at 08:19

## 2023-01-01 RX ADMIN — SENNOSIDES 8.6 MG: 8.6 TABLET, COATED ORAL at 08:19

## 2023-01-01 RX ADMIN — DILTIAZEM HYDROCHLORIDE 120 MG: 60 CAPSULE, EXTENDED RELEASE ORAL at 21:30

## 2023-01-01 RX ADMIN — LEVOTHYROXINE SODIUM 200 MCG: 0.03 TABLET ORAL at 10:31

## 2023-01-01 RX ADMIN — METOPROLOL SUCCINATE 50 MG: 50 TABLET, EXTENDED RELEASE ORAL at 12:47

## 2023-01-01 RX ADMIN — APIXABAN 5 MG: 5 TABLET, FILM COATED ORAL at 09:02

## 2023-01-01 RX ADMIN — KETOROLAC TROMETHAMINE 1 DROP: 5 SOLUTION OPHTHALMIC at 07:52

## 2023-01-01 RX ADMIN — OXYCODONE HYDROCHLORIDE 5 MG: 5 TABLET ORAL at 09:20

## 2023-01-01 RX ADMIN — HYDROMORPHONE HYDROCHLORIDE 0.2 MG: 0.2 INJECTION, SOLUTION INTRAMUSCULAR; INTRAVENOUS; SUBCUTANEOUS at 20:22

## 2023-01-01 RX ADMIN — ATORVASTATIN CALCIUM 40 MG: 40 TABLET, FILM COATED ORAL at 07:46

## 2023-01-01 RX ADMIN — NYSTATIN: 100000 CREAM TOPICAL at 20:58

## 2023-01-01 RX ADMIN — OXYCODONE HYDROCHLORIDE AND ACETAMINOPHEN 1 TABLET: 5; 325 TABLET ORAL at 16:16

## 2023-01-01 RX ADMIN — ACETAMINOPHEN 325MG 650 MG: 325 TABLET ORAL at 16:47

## 2023-01-01 RX ADMIN — Medication 1 LOZENGE: at 08:50

## 2023-01-01 RX ADMIN — HYDRALAZINE HYDROCHLORIDE 10 MG: 10 TABLET ORAL at 20:23

## 2023-01-01 RX ADMIN — INSULIN ASPART 5 UNITS: 100 INJECTION, SOLUTION INTRAVENOUS; SUBCUTANEOUS at 11:27

## 2023-01-01 RX ADMIN — MICONAZOLE NITRATE: 20 CREAM TOPICAL at 20:09

## 2023-01-01 RX ADMIN — APIXABAN 5 MG: 5 TABLET, FILM COATED ORAL at 08:30

## 2023-01-01 RX ADMIN — SENNOSIDES 8.6 MG: 8.6 TABLET ORAL at 08:10

## 2023-01-01 RX ADMIN — NICOTINE 1 PATCH: 14 PATCH, EXTENDED RELEASE TRANSDERMAL at 22:39

## 2023-01-01 RX ADMIN — LEVOTHYROXINE SODIUM 200 MCG: 100 TABLET ORAL at 09:05

## 2023-01-01 RX ADMIN — OXYCODONE HYDROCHLORIDE AND ACETAMINOPHEN 1 TABLET: 5; 325 TABLET ORAL at 14:06

## 2023-01-01 RX ADMIN — POLYETHYLENE GLYCOL 3350 17 G: 17 POWDER, FOR SOLUTION ORAL at 08:46

## 2023-01-01 RX ADMIN — NICOTINE 1 PATCH: 14 PATCH, EXTENDED RELEASE TRANSDERMAL at 08:13

## 2023-01-01 RX ADMIN — HYDRALAZINE HYDROCHLORIDE 10 MG: 10 TABLET ORAL at 08:52

## 2023-01-01 RX ADMIN — NYSTATIN: 100000 CREAM TOPICAL at 07:57

## 2023-01-01 RX ADMIN — GABAPENTIN 600 MG: 300 CAPSULE ORAL at 09:07

## 2023-01-01 RX ADMIN — INSULIN GLARGINE 40 UNITS: 100 INJECTION, SOLUTION SUBCUTANEOUS at 07:41

## 2023-01-01 RX ADMIN — INSULIN ASPART 4 UNITS: 100 INJECTION, SOLUTION INTRAVENOUS; SUBCUTANEOUS at 09:23

## 2023-01-01 RX ADMIN — ATORVASTATIN CALCIUM 40 MG: 40 TABLET, FILM COATED ORAL at 08:31

## 2023-01-01 RX ADMIN — SENNOSIDES 8.6 MG: 8.6 TABLET ORAL at 09:22

## 2023-01-01 RX ADMIN — INSULIN ASPART 2 UNITS: 100 INJECTION, SOLUTION INTRAVENOUS; SUBCUTANEOUS at 10:47

## 2023-01-01 RX ADMIN — SENNOSIDES 8.6 MG: 8.6 TABLET ORAL at 20:47

## 2023-01-01 RX ADMIN — Medication 1 LOZENGE: at 08:04

## 2023-01-01 RX ADMIN — KETOROLAC TROMETHAMINE 1 DROP: 5 SOLUTION OPHTHALMIC at 08:06

## 2023-01-01 RX ADMIN — DOCUSATE SODIUM 50 MG: 50 CAPSULE, LIQUID FILLED ORAL at 22:12

## 2023-01-01 RX ADMIN — SODIUM ZIRCONIUM CYCLOSILICATE 15 G: 10 POWDER, FOR SUSPENSION ORAL at 17:01

## 2023-01-01 RX ADMIN — ACETAMINOPHEN 650 MG: 325 TABLET, FILM COATED ORAL at 21:54

## 2023-01-01 RX ADMIN — HUMAN ALBUMIN MICROSPHERES AND PERFLUTREN 6 ML: 10; .22 INJECTION, SOLUTION INTRAVENOUS at 08:38

## 2023-01-01 RX ADMIN — Medication 1 LOZENGE: at 03:11

## 2023-01-01 RX ADMIN — DOCUSATE SODIUM 100 MG: 50 CAPSULE, LIQUID FILLED ORAL at 20:41

## 2023-01-01 RX ADMIN — TERBINAFINE HYDROCHLORIDE: 1 CREAM TOPICAL at 08:08

## 2023-01-01 RX ADMIN — CAPSAICIN: 0.25 CREAM TOPICAL at 09:03

## 2023-01-01 RX ADMIN — ACETAMINOPHEN 650 MG: 325 TABLET, FILM COATED ORAL at 06:52

## 2023-01-01 RX ADMIN — HYDRALAZINE HYDROCHLORIDE 10 MG: 10 TABLET, FILM COATED ORAL at 13:22

## 2023-01-01 RX ADMIN — NICOTINE 1 PATCH: 14 PATCH, EXTENDED RELEASE TRANSDERMAL at 10:09

## 2023-01-01 RX ADMIN — Medication 1 LOZENGE: at 10:41

## 2023-01-01 RX ADMIN — DEXTROSE MONOHYDRATE 300 ML: 100 INJECTION, SOLUTION INTRAVENOUS at 16:17

## 2023-01-01 RX ADMIN — DOCUSATE SODIUM 50 MG: 50 CAPSULE, LIQUID FILLED ORAL at 12:39

## 2023-01-01 RX ADMIN — LEVOTHYROXINE SODIUM 400 MCG: 0.15 TABLET ORAL at 06:56

## 2023-01-01 RX ADMIN — NITROFURANTOIN (MONOHYDRATE/MACROCRYSTALS) 100 MG: 75; 25 CAPSULE ORAL at 20:13

## 2023-01-01 RX ADMIN — OXYCODONE HYDROCHLORIDE AND ACETAMINOPHEN 1 TABLET: 5; 325 TABLET ORAL at 18:37

## 2023-01-01 RX ADMIN — NICOTINE 1 PATCH: 14 PATCH, EXTENDED RELEASE TRANSDERMAL at 22:44

## 2023-01-01 RX ADMIN — LEVOTHYROXINE SODIUM 200 MCG: 100 TABLET ORAL at 08:19

## 2023-01-01 RX ADMIN — INSULIN ASPART 3 UNITS: 100 INJECTION, SOLUTION INTRAVENOUS; SUBCUTANEOUS at 12:46

## 2023-01-01 RX ADMIN — GABAPENTIN 600 MG: 300 CAPSULE ORAL at 20:19

## 2023-01-01 RX ADMIN — OXYCODONE HYDROCHLORIDE AND ACETAMINOPHEN 1 TABLET: 5; 325 TABLET ORAL at 22:04

## 2023-01-01 RX ADMIN — DILTIAZEM HYDROCHLORIDE 120 MG: 60 CAPSULE, EXTENDED RELEASE ORAL at 21:11

## 2023-01-01 RX ADMIN — HUMAN INSULIN 3 UNITS/HR: 100 INJECTION, SOLUTION SUBCUTANEOUS at 07:41

## 2023-01-01 RX ADMIN — APIXABAN 5 MG: 5 TABLET, FILM COATED ORAL at 21:28

## 2023-01-01 RX ADMIN — SENNOSIDES 8.6 MG: 8.6 TABLET, COATED ORAL at 07:49

## 2023-01-01 RX ADMIN — LEVOTHYROXINE SODIUM 200 MCG: 0.03 TABLET ORAL at 07:59

## 2023-01-01 RX ADMIN — ACETAZOLAMIDE SODIUM 500 MG: 500 INJECTION, POWDER, LYOPHILIZED, FOR SOLUTION INTRAVENOUS at 10:10

## 2023-01-01 RX ADMIN — METOPROLOL SUCCINATE 25 MG: 25 TABLET, EXTENDED RELEASE ORAL at 08:09

## 2023-01-01 RX ADMIN — DILTIAZEM HYDROCHLORIDE 120 MG: 60 CAPSULE, EXTENDED RELEASE ORAL at 08:52

## 2023-01-01 RX ADMIN — BUMETANIDE 3 MG: 1 TABLET ORAL at 08:08

## 2023-01-01 RX ADMIN — HYDROXYZINE HYDROCHLORIDE 50 MG: 25 TABLET, FILM COATED ORAL at 17:36

## 2023-01-01 RX ADMIN — NYSTATIN: 100000 CREAM TOPICAL at 09:30

## 2023-01-01 RX ADMIN — METHOCARBAMOL 500 MG: 500 TABLET ORAL at 02:56

## 2023-01-01 RX ADMIN — INSULIN GLARGINE 70 UNITS: 100 INJECTION, SOLUTION SUBCUTANEOUS at 08:39

## 2023-01-01 RX ADMIN — ATORVASTATIN CALCIUM 40 MG: 40 TABLET, FILM COATED ORAL at 07:50

## 2023-01-01 RX ADMIN — MICONAZOLE NITRATE: 20 CREAM TOPICAL at 20:19

## 2023-01-01 RX ADMIN — HYDROXYZINE HYDROCHLORIDE 50 MG: 25 TABLET, FILM COATED ORAL at 16:14

## 2023-01-01 RX ADMIN — OXYCODONE HYDROCHLORIDE AND ACETAMINOPHEN 1 TABLET: 5; 325 TABLET ORAL at 21:21

## 2023-01-01 RX ADMIN — DEXTROSE: 20 INJECTION, SOLUTION INTRAVENOUS at 17:22

## 2023-01-01 RX ADMIN — SODIUM CHLORIDE, PRESERVATIVE FREE 15 ML: 5 INJECTION INTRAVENOUS at 17:10

## 2023-01-01 RX ADMIN — GABAPENTIN 600 MG: 300 CAPSULE ORAL at 21:19

## 2023-01-01 RX ADMIN — OXYCODONE HYDROCHLORIDE AND ACETAMINOPHEN 1 TABLET: 5; 325 TABLET ORAL at 15:44

## 2023-01-01 RX ADMIN — METHOCARBAMOL 500 MG: 500 TABLET ORAL at 19:38

## 2023-01-01 RX ADMIN — CYCLOBENZAPRINE 10 MG: 10 TABLET, FILM COATED ORAL at 16:03

## 2023-01-01 RX ADMIN — DOCUSATE SODIUM 50 MG: 50 CAPSULE, LIQUID FILLED ORAL at 09:07

## 2023-01-01 RX ADMIN — LIRAGLUTIDE 0.6 MG: 6 INJECTION SUBCUTANEOUS at 08:33

## 2023-01-01 RX ADMIN — NICOTINE POLACRILEX 2 MG: 2 GUM, CHEWING BUCCAL at 08:19

## 2023-01-01 RX ADMIN — METHOCARBAMOL 500 MG: 500 TABLET ORAL at 10:29

## 2023-01-01 RX ADMIN — KETOROLAC TROMETHAMINE 1 DROP: 5 SOLUTION OPHTHALMIC at 20:47

## 2023-01-01 RX ADMIN — ACETAMINOPHEN 650 MG: 325 TABLET, FILM COATED ORAL at 18:26

## 2023-01-01 RX ADMIN — HYDROMORPHONE HYDROCHLORIDE 0.2 MG: 0.2 INJECTION, SOLUTION INTRAMUSCULAR; INTRAVENOUS; SUBCUTANEOUS at 15:21

## 2023-01-01 RX ADMIN — BUMETANIDE 3 MG: 1 TABLET ORAL at 16:20

## 2023-01-01 RX ADMIN — MICONAZOLE NITRATE: 20 CREAM TOPICAL at 07:40

## 2023-01-01 RX ADMIN — LEVOTHYROXINE SODIUM 200 MCG: 0.03 TABLET ORAL at 08:20

## 2023-01-01 RX ADMIN — INSULIN ASPART 2 UNITS: 100 INJECTION, SOLUTION INTRAVENOUS; SUBCUTANEOUS at 18:56

## 2023-01-01 RX ADMIN — NICOTINE 1 PATCH: 14 PATCH, EXTENDED RELEASE TRANSDERMAL at 00:28

## 2023-01-01 RX ADMIN — INSULIN ASPART 5 UNITS: 100 INJECTION, SOLUTION INTRAVENOUS; SUBCUTANEOUS at 14:40

## 2023-01-01 RX ADMIN — ACETAMINOPHEN 650 MG: 325 TABLET, FILM COATED ORAL at 22:31

## 2023-01-01 RX ADMIN — NITROFURANTOIN (MONOHYDRATE/MACROCRYSTALS) 100 MG: 75; 25 CAPSULE ORAL at 20:59

## 2023-01-01 RX ADMIN — ISOSORBIDE DINITRATE 20 MG: 20 TABLET ORAL at 11:12

## 2023-01-01 RX ADMIN — SENNOSIDES 8.6 MG: 8.6 TABLET ORAL at 20:23

## 2023-01-01 RX ADMIN — DOCUSATE SODIUM 50 MG: 50 CAPSULE, LIQUID FILLED ORAL at 20:26

## 2023-01-01 RX ADMIN — HYDRALAZINE HYDROCHLORIDE 10 MG: 10 TABLET, FILM COATED ORAL at 09:08

## 2023-01-01 RX ADMIN — BUMETANIDE 3 MG/HR: 0.25 INJECTION, SOLUTION INTRAMUSCULAR; INTRAVENOUS at 17:39

## 2023-01-01 RX ADMIN — HYDRALAZINE HYDROCHLORIDE 10 MG: 10 TABLET ORAL at 14:23

## 2023-01-01 RX ADMIN — Medication 1 LOZENGE: at 08:21

## 2023-01-01 RX ADMIN — SIMETHICONE 80 MG: 80 TABLET, CHEWABLE ORAL at 19:35

## 2023-01-01 RX ADMIN — BUMETANIDE 3 MG/HR: 0.25 INJECTION, SOLUTION INTRAMUSCULAR; INTRAVENOUS at 18:16

## 2023-01-01 RX ADMIN — GABAPENTIN 600 MG: 300 CAPSULE ORAL at 07:44

## 2023-01-01 RX ADMIN — OXYCODONE HYDROCHLORIDE AND ACETAMINOPHEN 1 TABLET: 5; 325 TABLET ORAL at 15:43

## 2023-01-01 RX ADMIN — DOCUSATE SODIUM 50 MG: 50 CAPSULE, LIQUID FILLED ORAL at 09:22

## 2023-01-01 RX ADMIN — NICOTINE POLACRILEX 2 MG: 2 GUM, CHEWING BUCCAL at 18:08

## 2023-01-01 RX ADMIN — HYDROMORPHONE HYDROCHLORIDE 0.2 MG: 0.2 INJECTION, SOLUTION INTRAMUSCULAR; INTRAVENOUS; SUBCUTANEOUS at 11:25

## 2023-01-01 RX ADMIN — APIXABAN 5 MG: 5 TABLET, FILM COATED ORAL at 22:09

## 2023-01-01 RX ADMIN — SENNOSIDES 8.6 MG: 8.6 TABLET, COATED ORAL at 20:15

## 2023-01-01 RX ADMIN — ACETAMINOPHEN 325MG 650 MG: 325 TABLET ORAL at 21:30

## 2023-01-01 RX ADMIN — BUMETANIDE 1 MG: 1 TABLET ORAL at 08:25

## 2023-01-01 RX ADMIN — GABAPENTIN 600 MG: 300 CAPSULE ORAL at 08:10

## 2023-01-01 RX ADMIN — METHOCARBAMOL 500 MG: 500 TABLET ORAL at 20:12

## 2023-01-01 RX ADMIN — DILTIAZEM HYDROCHLORIDE 120 MG: 60 CAPSULE, EXTENDED RELEASE ORAL at 20:12

## 2023-01-01 RX ADMIN — LIRAGLUTIDE 0.6 MG: 6 INJECTION SUBCUTANEOUS at 08:21

## 2023-01-01 RX ADMIN — MICONAZOLE NITRATE: 20 CREAM TOPICAL at 19:45

## 2023-01-01 RX ADMIN — Medication: at 18:14

## 2023-01-01 RX ADMIN — HYDRALAZINE HYDROCHLORIDE 10 MG: 10 TABLET, FILM COATED ORAL at 08:31

## 2023-01-01 RX ADMIN — GABAPENTIN 600 MG: 300 CAPSULE ORAL at 19:35

## 2023-01-01 RX ADMIN — MICONAZOLE NITRATE: 20 CREAM TOPICAL at 23:58

## 2023-01-01 RX ADMIN — OXYCODONE HYDROCHLORIDE AND ACETAMINOPHEN 1 TABLET: 5; 325 TABLET ORAL at 04:41

## 2023-01-01 RX ADMIN — DILTIAZEM HYDROCHLORIDE 120 MG: 60 CAPSULE, EXTENDED RELEASE ORAL at 08:05

## 2023-01-01 RX ADMIN — APIXABAN 5 MG: 5 TABLET, FILM COATED ORAL at 08:10

## 2023-01-01 RX ADMIN — OXYCODONE HYDROCHLORIDE AND ACETAMINOPHEN 1 TABLET: 5; 325 TABLET ORAL at 04:31

## 2023-01-01 RX ADMIN — METOPROLOL SUCCINATE 50 MG: 25 TABLET, EXTENDED RELEASE ORAL at 08:52

## 2023-01-01 RX ADMIN — INSULIN ASPART 2 UNITS: 100 INJECTION, SOLUTION INTRAVENOUS; SUBCUTANEOUS at 09:37

## 2023-01-01 RX ADMIN — OXYCODONE HYDROCHLORIDE 5 MG: 5 TABLET ORAL at 21:18

## 2023-01-01 RX ADMIN — SIMETHICONE 80 MG: 80 TABLET, CHEWABLE ORAL at 17:51

## 2023-01-01 RX ADMIN — HYDRALAZINE HYDROCHLORIDE 10 MG: 10 TABLET ORAL at 21:12

## 2023-01-01 RX ADMIN — POLYETHYLENE GLYCOL 3350 17 G: 17 POWDER, FOR SOLUTION ORAL at 12:24

## 2023-01-01 RX ADMIN — INSULIN ASPART 5 UNITS: 100 INJECTION, SOLUTION INTRAVENOUS; SUBCUTANEOUS at 11:57

## 2023-01-01 RX ADMIN — HYDRALAZINE HYDROCHLORIDE 10 MG: 10 TABLET ORAL at 09:22

## 2023-01-01 RX ADMIN — Medication 1 LOZENGE: at 09:05

## 2023-01-01 RX ADMIN — DILTIAZEM HYDROCHLORIDE 120 MG: 120 CAPSULE, COATED, EXTENDED RELEASE ORAL at 09:02

## 2023-01-01 RX ADMIN — MICONAZOLE NITRATE: 20 CREAM TOPICAL at 07:57

## 2023-01-01 RX ADMIN — NICOTINE POLACRILEX 2 MG: 2 GUM, CHEWING BUCCAL at 12:38

## 2023-01-01 RX ADMIN — INSULIN ASPART 10 UNITS: 100 INJECTION, SOLUTION INTRAVENOUS; SUBCUTANEOUS at 08:36

## 2023-01-01 RX ADMIN — NICOTINE 1 PATCH: 14 PATCH, EXTENDED RELEASE TRANSDERMAL at 09:05

## 2023-01-01 RX ADMIN — METHOCARBAMOL 500 MG: 500 TABLET ORAL at 20:00

## 2023-01-01 RX ADMIN — NICOTINE 1 PATCH: 14 PATCH, EXTENDED RELEASE TRANSDERMAL at 09:48

## 2023-01-01 RX ADMIN — SPIRONOLACTONE 50 MG: 50 TABLET ORAL at 07:47

## 2023-01-01 RX ADMIN — GABAPENTIN 600 MG: 300 CAPSULE ORAL at 19:40

## 2023-01-01 RX ADMIN — LIDOCAINE PATCH 4% 2 PATCH: 40 PATCH TOPICAL at 13:30

## 2023-01-01 RX ADMIN — OXYCODONE HYDROCHLORIDE 5 MG: 5 TABLET ORAL at 06:08

## 2023-01-01 RX ADMIN — METOPROLOL SUCCINATE 25 MG: 25 TABLET, EXTENDED RELEASE ORAL at 09:02

## 2023-01-01 RX ADMIN — Medication: at 12:59

## 2023-01-01 RX ADMIN — INSULIN ASPART 10 UNITS: 100 INJECTION, SOLUTION INTRAVENOUS; SUBCUTANEOUS at 16:19

## 2023-01-01 RX ADMIN — OXYCODONE HYDROCHLORIDE AND ACETAMINOPHEN 1 TABLET: 5; 325 TABLET ORAL at 08:16

## 2023-01-01 RX ADMIN — DIPHENHYDRAMINE HYDROCHLORIDE AND LIDOCAINE HYDROCHLORIDE AND ALUMINUM HYDROXIDE AND MAGNESIUM HYDRO 10 ML: KIT at 16:21

## 2023-01-01 RX ADMIN — ATORVASTATIN CALCIUM 40 MG: 40 TABLET, FILM COATED ORAL at 09:12

## 2023-01-01 RX ADMIN — GABAPENTIN 600 MG: 300 CAPSULE ORAL at 07:46

## 2023-01-01 RX ADMIN — ACETAMINOPHEN 650 MG: 325 TABLET ORAL at 19:49

## 2023-01-01 RX ADMIN — APIXABAN 5 MG: 5 TABLET, FILM COATED ORAL at 08:34

## 2023-01-01 RX ADMIN — GABAPENTIN 600 MG: 300 CAPSULE ORAL at 19:37

## 2023-01-01 RX ADMIN — ATORVASTATIN CALCIUM 40 MG: 40 TABLET, FILM COATED ORAL at 08:47

## 2023-01-01 RX ADMIN — NICOTINE 1 PATCH: 14 PATCH, EXTENDED RELEASE TRANSDERMAL at 08:09

## 2023-01-01 RX ADMIN — NICOTINE 1 PATCH: 14 PATCH, EXTENDED RELEASE TRANSDERMAL at 08:43

## 2023-01-01 RX ADMIN — DILTIAZEM HYDROCHLORIDE 120 MG: 60 CAPSULE, EXTENDED RELEASE ORAL at 08:07

## 2023-01-01 RX ADMIN — GABAPENTIN 600 MG: 300 CAPSULE ORAL at 20:12

## 2023-01-01 RX ADMIN — HYDRALAZINE HYDROCHLORIDE 10 MG: 10 TABLET ORAL at 13:30

## 2023-01-01 RX ADMIN — Medication 5 ML: at 00:28

## 2023-01-01 RX ADMIN — Medication: at 12:47

## 2023-01-01 RX ADMIN — GABAPENTIN 600 MG: 300 CAPSULE ORAL at 20:44

## 2023-01-01 RX ADMIN — GABAPENTIN 600 MG: 300 CAPSULE ORAL at 08:49

## 2023-01-01 RX ADMIN — ACETAMINOPHEN 650 MG: 325 TABLET, FILM COATED ORAL at 06:13

## 2023-01-01 RX ADMIN — HYDRALAZINE HYDROCHLORIDE 10 MG: 10 TABLET ORAL at 13:38

## 2023-01-01 RX ADMIN — ATORVASTATIN CALCIUM 40 MG: 40 TABLET, FILM COATED ORAL at 08:49

## 2023-01-01 RX ADMIN — OXYCODONE HYDROCHLORIDE 5 MG: 5 TABLET ORAL at 00:59

## 2023-01-01 RX ADMIN — METOPROLOL SUCCINATE 25 MG: 25 TABLET, EXTENDED RELEASE ORAL at 08:05

## 2023-01-01 RX ADMIN — Medication: at 20:44

## 2023-01-01 RX ADMIN — GABAPENTIN 600 MG: 300 CAPSULE ORAL at 22:18

## 2023-01-01 RX ADMIN — GABAPENTIN 600 MG: 300 CAPSULE ORAL at 08:38

## 2023-01-01 RX ADMIN — ATORVASTATIN CALCIUM 40 MG: 40 TABLET, FILM COATED ORAL at 08:26

## 2023-01-01 RX ADMIN — ACETAZOLAMIDE SODIUM 500 MG: 500 INJECTION, POWDER, LYOPHILIZED, FOR SOLUTION INTRAVENOUS at 00:23

## 2023-01-01 RX ADMIN — SENNOSIDES 8.6 MG: 8.6 TABLET, COATED ORAL at 19:49

## 2023-01-01 RX ADMIN — SODIUM CHLORIDE, PRESERVATIVE FREE 5 ML: 5 INJECTION INTRAVENOUS at 17:39

## 2023-01-01 RX ADMIN — Medication 1 LOZENGE: at 16:07

## 2023-01-01 RX ADMIN — ACETAMINOPHEN 325MG 650 MG: 325 TABLET ORAL at 06:12

## 2023-01-01 RX ADMIN — ACETAMINOPHEN 325MG 650 MG: 325 TABLET ORAL at 05:01

## 2023-01-01 RX ADMIN — DEXTROSE MONOHYDRATE 300 ML: 100 INJECTION, SOLUTION INTRAVENOUS at 00:11

## 2023-01-01 RX ADMIN — DILTIAZEM HYDROCHLORIDE 120 MG: 60 CAPSULE, EXTENDED RELEASE ORAL at 08:21

## 2023-01-01 RX ADMIN — METOPROLOL SUCCINATE 50 MG: 25 TABLET, EXTENDED RELEASE ORAL at 08:32

## 2023-01-01 RX ADMIN — APIXABAN 5 MG: 5 TABLET, FILM COATED ORAL at 09:06

## 2023-01-01 RX ADMIN — SENNOSIDES 8.6 MG: 8.6 TABLET ORAL at 22:09

## 2023-01-01 RX ADMIN — NICOTINE 1 PATCH: 14 PATCH, EXTENDED RELEASE TRANSDERMAL at 08:06

## 2023-01-01 RX ADMIN — OXYCODONE HYDROCHLORIDE 5 MG: 5 TABLET ORAL at 06:00

## 2023-01-01 RX ADMIN — GABAPENTIN 600 MG: 300 CAPSULE ORAL at 07:57

## 2023-01-01 RX ADMIN — APIXABAN 5 MG: 5 TABLET, FILM COATED ORAL at 08:09

## 2023-01-01 RX ADMIN — GABAPENTIN 600 MG: 300 CAPSULE ORAL at 08:26

## 2023-01-01 RX ADMIN — MICONAZOLE NITRATE: 20 CREAM TOPICAL at 21:35

## 2023-01-01 RX ADMIN — MICONAZOLE NITRATE: 20 CREAM TOPICAL at 20:03

## 2023-01-01 RX ADMIN — DILTIAZEM HYDROCHLORIDE 120 MG: 60 CAPSULE, EXTENDED RELEASE ORAL at 08:34

## 2023-01-01 RX ADMIN — DILTIAZEM HYDROCHLORIDE 120 MG: 60 CAPSULE, EXTENDED RELEASE ORAL at 08:41

## 2023-01-01 RX ADMIN — HYDROMORPHONE HYDROCHLORIDE 0.2 MG: 0.2 INJECTION, SOLUTION INTRAMUSCULAR; INTRAVENOUS; SUBCUTANEOUS at 00:13

## 2023-01-01 RX ADMIN — HYDRALAZINE HYDROCHLORIDE 10 MG: 10 TABLET ORAL at 13:06

## 2023-01-01 RX ADMIN — SPIRONOLACTONE 50 MG: 50 TABLET ORAL at 08:38

## 2023-01-01 RX ADMIN — APIXABAN 5 MG: 5 TABLET, FILM COATED ORAL at 20:58

## 2023-01-01 RX ADMIN — METHOCARBAMOL 500 MG: 500 TABLET ORAL at 14:08

## 2023-01-01 RX ADMIN — NYSTATIN: 100000 CREAM TOPICAL at 19:46

## 2023-01-01 RX ADMIN — DILTIAZEM HYDROCHLORIDE 120 MG: 60 CAPSULE, EXTENDED RELEASE ORAL at 20:02

## 2023-01-01 RX ADMIN — Medication: at 09:17

## 2023-01-01 RX ADMIN — OXYCODONE HYDROCHLORIDE AND ACETAMINOPHEN 1 TABLET: 5; 325 TABLET ORAL at 20:19

## 2023-01-01 RX ADMIN — BUMETANIDE 1 MG: 1 TABLET ORAL at 08:20

## 2023-01-01 RX ADMIN — DILTIAZEM HYDROCHLORIDE 120 MG: 60 CAPSULE, EXTENDED RELEASE ORAL at 20:15

## 2023-01-01 RX ADMIN — BUMETANIDE 1 MG: 1 TABLET ORAL at 08:32

## 2023-01-01 RX ADMIN — ACETAMINOPHEN 650 MG: 325 TABLET, FILM COATED ORAL at 23:15

## 2023-01-01 RX ADMIN — METOPROLOL SUCCINATE 50 MG: 25 TABLET, EXTENDED RELEASE ORAL at 08:25

## 2023-01-01 RX ADMIN — GABAPENTIN 600 MG: 300 CAPSULE ORAL at 20:00

## 2023-01-01 RX ADMIN — METHOCARBAMOL 500 MG: 500 TABLET ORAL at 12:39

## 2023-01-01 RX ADMIN — ACETAMINOPHEN 650 MG: 325 TABLET, FILM COATED ORAL at 20:00

## 2023-01-01 RX ADMIN — Medication: at 20:17

## 2023-01-01 RX ADMIN — APIXABAN 5 MG: 5 TABLET, FILM COATED ORAL at 09:08

## 2023-01-01 RX ADMIN — ATORVASTATIN CALCIUM 40 MG: 40 TABLET, FILM COATED ORAL at 08:19

## 2023-01-01 RX ADMIN — NICOTINE 1 PATCH: 14 PATCH, EXTENDED RELEASE TRANSDERMAL at 23:24

## 2023-01-01 RX ADMIN — NICOTINE 1 PATCH: 14 PATCH, EXTENDED RELEASE TRANSDERMAL at 08:15

## 2023-01-01 RX ADMIN — CIPROFLOXACIN HYDROCHLORIDE 500 MG: 500 TABLET, FILM COATED ORAL at 21:28

## 2023-01-01 RX ADMIN — Medication: at 15:54

## 2023-01-01 RX ADMIN — INSULIN ASPART 9 UNITS: 100 INJECTION, SOLUTION INTRAVENOUS; SUBCUTANEOUS at 13:23

## 2023-01-01 RX ADMIN — APIXABAN 5 MG: 5 TABLET, FILM COATED ORAL at 08:04

## 2023-01-01 RX ADMIN — LEVOTHYROXINE SODIUM 200 MCG: 0.03 TABLET ORAL at 09:37

## 2023-01-01 RX ADMIN — BUMETANIDE 1 MG: 1 TABLET ORAL at 10:13

## 2023-01-01 RX ADMIN — METHOCARBAMOL 500 MG: 500 TABLET ORAL at 03:11

## 2023-01-01 RX ADMIN — METHOCARBAMOL 500 MG: 500 TABLET ORAL at 13:30

## 2023-01-01 RX ADMIN — INSULIN ASPART 9 UNITS: 100 INJECTION, SOLUTION INTRAVENOUS; SUBCUTANEOUS at 07:45

## 2023-01-01 RX ADMIN — INSULIN GLARGINE 65 UNITS: 100 INJECTION, SOLUTION SUBCUTANEOUS at 08:51

## 2023-01-01 RX ADMIN — SIMETHICONE 80 MG: 80 TABLET, CHEWABLE ORAL at 20:19

## 2023-01-01 RX ADMIN — MICONAZOLE NITRATE: 20 POWDER TOPICAL at 20:24

## 2023-01-01 RX ADMIN — INSULIN ASPART 15 UNITS: 100 INJECTION, SOLUTION INTRAVENOUS; SUBCUTANEOUS at 08:37

## 2023-01-01 RX ADMIN — NICOTINE POLACRILEX 2 MG: 2 GUM, CHEWING BUCCAL at 15:28

## 2023-01-01 RX ADMIN — HYDROXYZINE HYDROCHLORIDE 25 MG: 25 TABLET, FILM COATED ORAL at 15:57

## 2023-01-01 RX ADMIN — GABAPENTIN 600 MG: 300 CAPSULE ORAL at 20:22

## 2023-01-01 RX ADMIN — SENNOSIDES 8.6 MG: 8.6 TABLET, COATED ORAL at 20:35

## 2023-01-01 RX ADMIN — BUMETANIDE 5 MG: 1 TABLET ORAL at 07:46

## 2023-01-01 RX ADMIN — SIMETHICONE 80 MG: 80 TABLET, CHEWABLE ORAL at 09:05

## 2023-01-01 RX ADMIN — GABAPENTIN 600 MG: 300 CAPSULE ORAL at 13:38

## 2023-01-01 RX ADMIN — METHOCARBAMOL 500 MG: 500 TABLET ORAL at 05:42

## 2023-01-01 RX ADMIN — METHOCARBAMOL 500 MG: 500 TABLET ORAL at 18:17

## 2023-01-01 RX ADMIN — METHOCARBAMOL 500 MG: 500 TABLET ORAL at 09:21

## 2023-01-01 RX ADMIN — Medication 1 LOZENGE: at 06:14

## 2023-01-01 RX ADMIN — INSULIN ASPART 10 UNITS: 100 INJECTION, SOLUTION INTRAVENOUS; SUBCUTANEOUS at 08:05

## 2023-01-01 RX ADMIN — KETOROLAC TROMETHAMINE 1 DROP: 5 SOLUTION OPHTHALMIC at 08:07

## 2023-01-01 RX ADMIN — ACETAMINOPHEN 650 MG: 325 TABLET, FILM COATED ORAL at 03:11

## 2023-01-01 RX ADMIN — HUMAN ALBUMIN MICROSPHERES AND PERFLUTREN 5 ML: 10; .22 INJECTION, SOLUTION INTRAVENOUS at 09:02

## 2023-01-01 RX ADMIN — BUMETANIDE 1 MG: 1 TABLET ORAL at 08:52

## 2023-01-01 RX ADMIN — INSULIN ASPART 6 UNITS: 100 INJECTION, SOLUTION INTRAVENOUS; SUBCUTANEOUS at 16:38

## 2023-01-01 RX ADMIN — INSULIN ASPART 3 UNITS: 100 INJECTION, SOLUTION INTRAVENOUS; SUBCUTANEOUS at 08:04

## 2023-01-01 RX ADMIN — NYSTATIN: 100000 CREAM TOPICAL at 20:47

## 2023-01-01 RX ADMIN — GABAPENTIN 600 MG: 300 CAPSULE ORAL at 07:49

## 2023-01-01 RX ADMIN — TOPIRAMATE 50 MG: 50 TABLET ORAL at 08:34

## 2023-01-01 RX ADMIN — APIXABAN 5 MG: 5 TABLET, FILM COATED ORAL at 20:18

## 2023-01-01 RX ADMIN — MAGNESIUM OXIDE TAB 400 MG (240 MG ELEMENTAL MG) 400 MG: 400 (240 MG) TAB at 17:22

## 2023-01-01 RX ADMIN — INSULIN ASPART 19 UNITS: 100 INJECTION, SOLUTION INTRAVENOUS; SUBCUTANEOUS at 08:38

## 2023-01-01 RX ADMIN — ACETAMINOPHEN 650 MG: 325 TABLET, FILM COATED ORAL at 15:10

## 2023-01-01 RX ADMIN — ACETAMINOPHEN 650 MG: 325 TABLET, FILM COATED ORAL at 02:56

## 2023-01-01 RX ADMIN — Medication 1 LOZENGE: at 09:50

## 2023-01-01 RX ADMIN — METOPROLOL SUCCINATE 50 MG: 50 TABLET, EXTENDED RELEASE ORAL at 08:21

## 2023-01-01 RX ADMIN — APIXABAN 5 MG: 5 TABLET, FILM COATED ORAL at 17:09

## 2023-01-01 RX ADMIN — DOCUSATE SODIUM 50 MG: 50 CAPSULE, LIQUID FILLED ORAL at 08:27

## 2023-01-01 RX ADMIN — GABAPENTIN 600 MG: 300 CAPSULE ORAL at 20:08

## 2023-01-01 RX ADMIN — GABAPENTIN 600 MG: 300 CAPSULE ORAL at 19:49

## 2023-01-01 RX ADMIN — MICONAZOLE NITRATE: 20 CREAM TOPICAL at 08:06

## 2023-01-01 RX ADMIN — LEVOTHYROXINE SODIUM 200 MCG: 0.03 TABLET ORAL at 08:13

## 2023-01-01 RX ADMIN — APIXABAN 5 MG: 5 TABLET, FILM COATED ORAL at 20:00

## 2023-01-01 ASSESSMENT — ACTIVITIES OF DAILY LIVING (ADL)
ADLS_ACUITY_SCORE: 48
ADLS_ACUITY_SCORE: 46
ADLS_ACUITY_SCORE: 40
ADLS_ACUITY_SCORE: 42
ADLS_ACUITY_SCORE: 48
ADLS_ACUITY_SCORE: 48
ADLS_ACUITY_SCORE: 44
ADLS_ACUITY_SCORE: 48
ADLS_ACUITY_SCORE: 40
ADLS_ACUITY_SCORE: 40
ADLS_ACUITY_SCORE: 47
ADLS_ACUITY_SCORE: 44
ADLS_ACUITY_SCORE: 43
ADLS_ACUITY_SCORE: 47
ADLS_ACUITY_SCORE: 34
ADLS_ACUITY_SCORE: 44
ADLS_ACUITY_SCORE: 37
ADLS_ACUITY_SCORE: 44
ADLS_ACUITY_SCORE: 46
ADLS_ACUITY_SCORE: 37
ADLS_ACUITY_SCORE: 48
ADLS_ACUITY_SCORE: 42
ADLS_ACUITY_SCORE: 43
ADLS_ACUITY_SCORE: 48
ADLS_ACUITY_SCORE: 46
ADLS_ACUITY_SCORE: 48
ADLS_ACUITY_SCORE: 42
ADLS_ACUITY_SCORE: 44
ADLS_ACUITY_SCORE: 32
ADLS_ACUITY_SCORE: 47
ADLS_ACUITY_SCORE: 48
ADLS_ACUITY_SCORE: 43
ADLS_ACUITY_SCORE: 42
ADLS_ACUITY_SCORE: 47
ADLS_ACUITY_SCORE: 40
ADLS_ACUITY_SCORE: 44
ADLS_ACUITY_SCORE: 36
ADLS_ACUITY_SCORE: 42
ADLS_ACUITY_SCORE: 44
ADLS_ACUITY_SCORE: 37
ADLS_ACUITY_SCORE: 41
ADLS_ACUITY_SCORE: 46
ADLS_ACUITY_SCORE: 45
ADLS_ACUITY_SCORE: 44
ADLS_ACUITY_SCORE: 37
ADLS_ACUITY_SCORE: 50
ADLS_ACUITY_SCORE: 44
ADLS_ACUITY_SCORE: 36
ADLS_ACUITY_SCORE: 37
ADLS_ACUITY_SCORE: 42
ADLS_ACUITY_SCORE: 39
ADLS_ACUITY_SCORE: 44
ADLS_ACUITY_SCORE: 50
ADLS_ACUITY_SCORE: 39
ADLS_ACUITY_SCORE: 48
ADLS_ACUITY_SCORE: 44
ADLS_ACUITY_SCORE: 47
ADLS_ACUITY_SCORE: 44
ADLS_ACUITY_SCORE: 32
ADLS_ACUITY_SCORE: 40
ADLS_ACUITY_SCORE: 44
ADLS_ACUITY_SCORE: 42
ADLS_ACUITY_SCORE: 37
ADLS_ACUITY_SCORE: 50
ADLS_ACUITY_SCORE: 46
ADLS_ACUITY_SCORE: 42
ADLS_ACUITY_SCORE: 46
ADLS_ACUITY_SCORE: 42
ADLS_ACUITY_SCORE: 46
ADLS_ACUITY_SCORE: 46
ADLS_ACUITY_SCORE: 44
ADLS_ACUITY_SCORE: 53
ADLS_ACUITY_SCORE: 43
ADLS_ACUITY_SCORE: 37
ADLS_ACUITY_SCORE: 42
ADLS_ACUITY_SCORE: 40
ADLS_ACUITY_SCORE: 42
ADLS_ACUITY_SCORE: 42
ADLS_ACUITY_SCORE: 44
ADLS_ACUITY_SCORE: 42
ADLS_ACUITY_SCORE: 39
ADLS_ACUITY_SCORE: 46
ADLS_ACUITY_SCORE: 44
ADLS_ACUITY_SCORE: 42
ADLS_ACUITY_SCORE: 37
ADLS_ACUITY_SCORE: 44
ADLS_ACUITY_SCORE: 44
DEPENDENT_IADLS:: COOKING;LAUNDRY;SHOPPING;MEAL PREPARATION;TRANSPORTATION;CLEANING
ADLS_ACUITY_SCORE: 50
ADLS_ACUITY_SCORE: 36
ADLS_ACUITY_SCORE: 44
ADLS_ACUITY_SCORE: 48
ADLS_ACUITY_SCORE: 42
ADLS_ACUITY_SCORE: 44
ADLS_ACUITY_SCORE: 44
ADLS_ACUITY_SCORE: 37
ADLS_ACUITY_SCORE: 37
ADLS_ACUITY_SCORE: 34
ADLS_ACUITY_SCORE: 37
ADLS_ACUITY_SCORE: 48
ADLS_ACUITY_SCORE: 48
ADLS_ACUITY_SCORE: 44
ADLS_ACUITY_SCORE: 39
ADLS_ACUITY_SCORE: 43
ADLS_ACUITY_SCORE: 40
ADLS_ACUITY_SCORE: 48
ADLS_ACUITY_SCORE: 42
ADLS_ACUITY_SCORE: 46
ADLS_ACUITY_SCORE: 32
ADLS_ACUITY_SCORE: 39
ADLS_ACUITY_SCORE: 48
ADLS_ACUITY_SCORE: 37
ADLS_ACUITY_SCORE: 32
ADLS_ACUITY_SCORE: 42
ADLS_ACUITY_SCORE: 44
ADLS_ACUITY_SCORE: 44
ADLS_ACUITY_SCORE: 48
ADLS_ACUITY_SCORE: 44
ADLS_ACUITY_SCORE: 47
ADLS_ACUITY_SCORE: 46
ADLS_ACUITY_SCORE: 44
ADLS_ACUITY_SCORE: 43
ADLS_ACUITY_SCORE: 44
ADLS_ACUITY_SCORE: 34
ADLS_ACUITY_SCORE: 39
ADLS_ACUITY_SCORE: 35
ADLS_ACUITY_SCORE: 47
ADLS_ACUITY_SCORE: 47
ADLS_ACUITY_SCORE: 44
ADLS_ACUITY_SCORE: 47
ADLS_ACUITY_SCORE: 37
ADLS_ACUITY_SCORE: 37
ADLS_ACUITY_SCORE: 43
ADLS_ACUITY_SCORE: 44
ADLS_ACUITY_SCORE: 48
ADLS_ACUITY_SCORE: 48
ADLS_ACUITY_SCORE: 43
ADLS_ACUITY_SCORE: 47
ADLS_ACUITY_SCORE: 42
ADLS_ACUITY_SCORE: 42
ADLS_ACUITY_SCORE: 47
ADLS_ACUITY_SCORE: 39
ADLS_ACUITY_SCORE: 47
ADLS_ACUITY_SCORE: 48
ADLS_ACUITY_SCORE: 44
ADLS_ACUITY_SCORE: 53
ADLS_ACUITY_SCORE: 32
ADLS_ACUITY_SCORE: 44
ADLS_ACUITY_SCORE: 42
ADLS_ACUITY_SCORE: 32
ADLS_ACUITY_SCORE: 48
ADLS_ACUITY_SCORE: 49
ADLS_ACUITY_SCORE: 46
ADLS_ACUITY_SCORE: 44
ADLS_ACUITY_SCORE: 32
ADLS_ACUITY_SCORE: 46
ADLS_ACUITY_SCORE: 40
ADLS_ACUITY_SCORE: 37
ADLS_ACUITY_SCORE: 42
ADLS_ACUITY_SCORE: 44
ADLS_ACUITY_SCORE: 40
ADLS_ACUITY_SCORE: 40
ADLS_ACUITY_SCORE: 48
ADLS_ACUITY_SCORE: 34
ADLS_ACUITY_SCORE: 44
ADLS_ACUITY_SCORE: 34
ADLS_ACUITY_SCORE: 47
ADLS_ACUITY_SCORE: 40
ADLS_ACUITY_SCORE: 44
ADLS_ACUITY_SCORE: 36
ADLS_ACUITY_SCORE: 37
ADLS_ACUITY_SCORE: 36
ADLS_ACUITY_SCORE: 42
ADLS_ACUITY_SCORE: 36
ADLS_ACUITY_SCORE: 44
ADLS_ACUITY_SCORE: 46
ADLS_ACUITY_SCORE: 46
ADLS_ACUITY_SCORE: 40
ADLS_ACUITY_SCORE: 43
ADLS_ACUITY_SCORE: 44
ADLS_ACUITY_SCORE: 46
ADLS_ACUITY_SCORE: 44
ADLS_ACUITY_SCORE: 53
ADLS_ACUITY_SCORE: 47
ADLS_ACUITY_SCORE: 43
ADLS_ACUITY_SCORE: 48
ADLS_ACUITY_SCORE: 37
ADLS_ACUITY_SCORE: 36
ADLS_ACUITY_SCORE: 37
ADLS_ACUITY_SCORE: 36
ADLS_ACUITY_SCORE: 47
ADLS_ACUITY_SCORE: 32
ADLS_ACUITY_SCORE: 44
ADLS_ACUITY_SCORE: 46
ADLS_ACUITY_SCORE: 32
ADLS_ACUITY_SCORE: 40
ADLS_ACUITY_SCORE: 44
ADLS_ACUITY_SCORE: 42
ADLS_ACUITY_SCORE: 39
ADLS_ACUITY_SCORE: 44
ADLS_ACUITY_SCORE: 44
ADLS_ACUITY_SCORE: 46
ADLS_ACUITY_SCORE: 44
ADLS_ACUITY_SCORE: 46
ADLS_ACUITY_SCORE: 42
ADLS_ACUITY_SCORE: 48
ADLS_ACUITY_SCORE: 46
ADLS_ACUITY_SCORE: 44
ADLS_ACUITY_SCORE: 46
ADLS_ACUITY_SCORE: 39
ADLS_ACUITY_SCORE: 44
ADLS_ACUITY_SCORE: 42
ADLS_ACUITY_SCORE: 44
ADLS_ACUITY_SCORE: 47
ADLS_ACUITY_SCORE: 44
ADLS_ACUITY_SCORE: 37
ADLS_ACUITY_SCORE: 48
ADLS_ACUITY_SCORE: 44
ADLS_ACUITY_SCORE: 38
ADLS_ACUITY_SCORE: 47
ADLS_ACUITY_SCORE: 46
ADLS_ACUITY_SCORE: 47
ADLS_ACUITY_SCORE: 44
ADLS_ACUITY_SCORE: 48
ADLS_ACUITY_SCORE: 44
ADLS_ACUITY_SCORE: 36
ADLS_ACUITY_SCORE: 48
ADLS_ACUITY_SCORE: 39
ADLS_ACUITY_SCORE: 48
ADLS_ACUITY_SCORE: 39
ADLS_ACUITY_SCORE: 32
ADLS_ACUITY_SCORE: 43
ADLS_ACUITY_SCORE: 40
ADLS_ACUITY_SCORE: 32
ADLS_ACUITY_SCORE: 46
ADLS_ACUITY_SCORE: 32
ADLS_ACUITY_SCORE: 50
ADLS_ACUITY_SCORE: 48
ADLS_ACUITY_SCORE: 44
ADLS_ACUITY_SCORE: 43
ADLS_ACUITY_SCORE: 44
ADLS_ACUITY_SCORE: 43
ADLS_ACUITY_SCORE: 43
ADLS_ACUITY_SCORE: 40
ADLS_ACUITY_SCORE: 46
ADLS_ACUITY_SCORE: 48
ADLS_ACUITY_SCORE: 41
ADLS_ACUITY_SCORE: 48
ADLS_ACUITY_SCORE: 46
ADLS_ACUITY_SCORE: 42
ADLS_ACUITY_SCORE: 37
ADLS_ACUITY_SCORE: 46
ADLS_ACUITY_SCORE: 42
ADLS_ACUITY_SCORE: 46
ADLS_ACUITY_SCORE: 34
ADLS_ACUITY_SCORE: 46
ADLS_ACUITY_SCORE: 42
ADLS_ACUITY_SCORE: 44
ADLS_ACUITY_SCORE: 42
ADLS_ACUITY_SCORE: 44
ADLS_ACUITY_SCORE: 36
ADLS_ACUITY_SCORE: 37
ADLS_ACUITY_SCORE: 43
ADLS_ACUITY_SCORE: 48
ADLS_ACUITY_SCORE: 44
ADLS_ACUITY_SCORE: 44
ADLS_ACUITY_SCORE: 47
ADLS_ACUITY_SCORE: 40
ADLS_ACUITY_SCORE: 44
ADLS_ACUITY_SCORE: 47
ADLS_ACUITY_SCORE: 39
ADLS_ACUITY_SCORE: 39
ADLS_ACUITY_SCORE: 37
ADLS_ACUITY_SCORE: 48
ADLS_ACUITY_SCORE: 44
ADLS_ACUITY_SCORE: 44
ADLS_ACUITY_SCORE: 40
ADLS_ACUITY_SCORE: 47
ADLS_ACUITY_SCORE: 40
ADLS_ACUITY_SCORE: 46
ADLS_ACUITY_SCORE: 41
ADLS_ACUITY_SCORE: 47
ADLS_ACUITY_SCORE: 47
ADLS_ACUITY_SCORE: 44
ADLS_ACUITY_SCORE: 39
ADLS_ACUITY_SCORE: 44
ADLS_ACUITY_SCORE: 42
ADLS_ACUITY_SCORE: 32
ADLS_ACUITY_SCORE: 44
ADLS_ACUITY_SCORE: 42
ADLS_ACUITY_SCORE: 36
ADLS_ACUITY_SCORE: 44
ADLS_ACUITY_SCORE: 48
ADLS_ACUITY_SCORE: 32
ADLS_ACUITY_SCORE: 42
ADLS_ACUITY_SCORE: 44
ADLS_ACUITY_SCORE: 32
ADLS_ACUITY_SCORE: 40
ADLS_ACUITY_SCORE: 39
ADLS_ACUITY_SCORE: 32
ADLS_ACUITY_SCORE: 37
ADLS_ACUITY_SCORE: 42
ADLS_ACUITY_SCORE: 46
ADLS_ACUITY_SCORE: 46
ADLS_ACUITY_SCORE: 42
ADLS_ACUITY_SCORE: 42
ADLS_ACUITY_SCORE: 47
ADLS_ACUITY_SCORE: 37
ADLS_ACUITY_SCORE: 44
ADLS_ACUITY_SCORE: 42
ADLS_ACUITY_SCORE: 39
ADLS_ACUITY_SCORE: 49
ADLS_ACUITY_SCORE: 43
ADLS_ACUITY_SCORE: 36
ADLS_ACUITY_SCORE: 39
ADLS_ACUITY_SCORE: 42
ADLS_ACUITY_SCORE: 42
ADLS_ACUITY_SCORE: 39
ADLS_ACUITY_SCORE: 39
ADLS_ACUITY_SCORE: 44
ADLS_ACUITY_SCORE: 47
ADLS_ACUITY_SCORE: 43
ADLS_ACUITY_SCORE: 47
ADLS_ACUITY_SCORE: 48
ADLS_ACUITY_SCORE: 46
ADLS_ACUITY_SCORE: 47
ADLS_ACUITY_SCORE: 36
ADLS_ACUITY_SCORE: 40
ADLS_ACUITY_SCORE: 42
ADLS_ACUITY_SCORE: 42
ADLS_ACUITY_SCORE: 46
ADLS_ACUITY_SCORE: 43
ADLS_ACUITY_SCORE: 48
ADLS_ACUITY_SCORE: 40
ADLS_ACUITY_SCORE: 41
ADLS_ACUITY_SCORE: 48
ADLS_ACUITY_SCORE: 36
ADLS_ACUITY_SCORE: 44
ADLS_ACUITY_SCORE: 53
ADLS_ACUITY_SCORE: 46
ADLS_ACUITY_SCORE: 42
ADLS_ACUITY_SCORE: 44
ADLS_ACUITY_SCORE: 48
ADLS_ACUITY_SCORE: 37
ADLS_ACUITY_SCORE: 37
ADLS_ACUITY_SCORE: 34
ADLS_ACUITY_SCORE: 42
ADLS_ACUITY_SCORE: 36
ADLS_ACUITY_SCORE: 32
ADLS_ACUITY_SCORE: 43
ADLS_ACUITY_SCORE: 42
ADLS_ACUITY_SCORE: 40
ADLS_ACUITY_SCORE: 44
ADLS_ACUITY_SCORE: 48
ADLS_ACUITY_SCORE: 37
ADLS_ACUITY_SCORE: 41
ADLS_ACUITY_SCORE: 46
ADLS_ACUITY_SCORE: 44
ADLS_ACUITY_SCORE: 30
ADLS_ACUITY_SCORE: 37
ADLS_ACUITY_SCORE: 47
ADLS_ACUITY_SCORE: 50
ADLS_ACUITY_SCORE: 42
ADLS_ACUITY_SCORE: 37
ADLS_ACUITY_SCORE: 43
ADLS_ACUITY_SCORE: 43
ADLS_ACUITY_SCORE: 44
ADLS_ACUITY_SCORE: 32
ADLS_ACUITY_SCORE: 32
ADLS_ACUITY_SCORE: 42
ADLS_ACUITY_SCORE: 42
ADLS_ACUITY_SCORE: 40
ADLS_ACUITY_SCORE: 39
ADLS_ACUITY_SCORE: 47
ADLS_ACUITY_SCORE: 32
ADLS_ACUITY_SCORE: 40
ADLS_ACUITY_SCORE: 32
ADLS_ACUITY_SCORE: 40
ADLS_ACUITY_SCORE: 53
ADLS_ACUITY_SCORE: 37
ADLS_ACUITY_SCORE: 46
ADLS_ACUITY_SCORE: 36
ADLS_ACUITY_SCORE: 42
ADLS_ACUITY_SCORE: 44
ADLS_ACUITY_SCORE: 44
ADLS_ACUITY_SCORE: 48
ADLS_ACUITY_SCORE: 46
ADLS_ACUITY_SCORE: 50
ADLS_ACUITY_SCORE: 42
ADLS_ACUITY_SCORE: 39
ADLS_ACUITY_SCORE: 44
ADLS_ACUITY_SCORE: 47
ADLS_ACUITY_SCORE: 46
ADLS_ACUITY_SCORE: 44
ADLS_ACUITY_SCORE: 48
ADLS_ACUITY_SCORE: 43
ADLS_ACUITY_SCORE: 43
ADLS_ACUITY_SCORE: 46
ADLS_ACUITY_SCORE: 47
ADLS_ACUITY_SCORE: 32
ADLS_ACUITY_SCORE: 43
ADLS_ACUITY_SCORE: 47
ADLS_ACUITY_SCORE: 47
ADLS_ACUITY_SCORE: 44
ADLS_ACUITY_SCORE: 48
ADLS_ACUITY_SCORE: 32
ADLS_ACUITY_SCORE: 42
ADLS_ACUITY_SCORE: 44
ADLS_ACUITY_SCORE: 36
ADLS_ACUITY_SCORE: 34
ADLS_ACUITY_SCORE: 37
ADLS_ACUITY_SCORE: 46
ADLS_ACUITY_SCORE: 44
ADLS_ACUITY_SCORE: 40
ADLS_ACUITY_SCORE: 40
ADLS_ACUITY_SCORE: 44
ADLS_ACUITY_SCORE: 37
ADLS_ACUITY_SCORE: 42
ADLS_ACUITY_SCORE: 47
ADLS_ACUITY_SCORE: 37
ADLS_ACUITY_SCORE: 42
ADLS_ACUITY_SCORE: 48
ADLS_ACUITY_SCORE: 48
ADLS_ACUITY_SCORE: 44
ADLS_ACUITY_SCORE: 53
ADLS_ACUITY_SCORE: 44
ADLS_ACUITY_SCORE: 44
DOING_ERRANDS_INDEPENDENTLY_DIFFICULTY: YES
ADLS_ACUITY_SCORE: 32
ADLS_ACUITY_SCORE: 43
ADLS_ACUITY_SCORE: 46
ADLS_ACUITY_SCORE: 43
ADLS_ACUITY_SCORE: 40
ADLS_ACUITY_SCORE: 40
ADLS_ACUITY_SCORE: 48
ADLS_ACUITY_SCORE: 43
ADLS_ACUITY_SCORE: 36
ADLS_ACUITY_SCORE: 36
ADLS_ACUITY_SCORE: 41
ADLS_ACUITY_SCORE: 43
ADLS_ACUITY_SCORE: 42
ADLS_ACUITY_SCORE: 32
ADLS_ACUITY_SCORE: 43
ADLS_ACUITY_SCORE: 42
ADLS_ACUITY_SCORE: 43

## 2023-01-01 NOTE — PROGRESS NOTES
Subjective:    I met with Pricilla this morning. She reports home dosing of:  -Toujeo 225 units daily  -NovoLog 100 units with each meal + CS  -Ozempic 1 mg weekly (it's been >1 week since her most recent dose) and she denies any contraindications to use of GLP-1 R agonist therapy    She notes she has been off of empagliflozin due to frequent UTIs in the past 1 year. Per notes she does not tolerate metformin.    She notes today that her appetite is significantly reduced in the hospital.    Objective:    Labs and VS reviewed. Pleasant and conversational, resting in bed.    A/P:    # DM-2    We reviewed that her significantly lower insulin needs in the hospital are related to decreased caloric intake. We will discharge her on what she is currently needing insulin wise, and we reviewed that she will very likely need to significantly increase her insulin dosing over the coming days.    Today in the hospital:  -Glargine 40 units at night  -Aspart insulin to carbohydrate ratio of 1:8 with meals and snacks + CS before meals  -Empagliflozin discontinued due to frequent UTIs over the past year  -Glucose checks before meals, bedtime, 2 AM with goal glucose <180 mg/dL    Upon discharge:  -Toujeo 40 units at bedtime  -Aspart 10 units with each meal + additional correction scale insulin as follows based on pre-meal glucose      -140 - 160: + 1 unit     -161 - 180: + 2 units     -181 - 200: + 3 units, and so on (sensitivity of 20 mg/dL)  -Resume Ozempic 1 mg weekly  -Do not resume empagliflozin given history of UTIs in the past 1 year - this can be readdressed as an outpatient at time of Endocrine follow-up     The primary service will need to issue prescriptions for all diabetes medications as well as glucagon (PRN for severe hypoglycemia) and Freestyle Jessica 2 sensors to the hospital pharmacy.    The primary service should also order an outpatient podiatry appointment for DM foot care.    I sent a message to her outpatient  endocrine team to set up an outpatient follow-up visit and our outpatient CDE to check in with her in a few days.

## 2023-01-01 NOTE — PLAN OF CARE
Goal Outcome Evaluation:      Plan of Care Reviewed With: patient    Overall Patient Progress: improvingOverall Patient Progress: improving    Outcome Evaluation: Was able to walk up stairs, cleared for discharge    Patient admitted for heart failure exacerbation and hypervolemia, PMH of NICM with EF 40-45%, afib, DM2, HTN, HLD, JYOTI, neuropathy, and morbid obesity.     Neuro: A&O x4, denied dizziness.    Respiratory:  Had dyspnea on exertion and orthopnea, lung sounds diminished in middle and lower lung lobes.  Cardiac: Distant heart sounds noted.  Had 2-3+ edema in legs and feet, was given scheduled bumetanide and spironolactone.   GI: Denied nausea, bowel sounds hyperactive.  Had loose stool earlier this morning, bowel medications held.    : Had significant urine output, see I&O flowsheet.   Skin: Skin breakdown under right breast healing, site pink and dry.  Applied miconazole powder per orders.   VS: In atrial fibrillation with rapid ventricular response and occasional PVCs, HR 90s-110s, SBP 100s-110s, SaO2 95% on room air.  Drips:  None.    Electrolytes: No replacements required.  Pain: Reported back and leg pain, was given prn analgesics and applied diclofenac gel to back. Patient reported a decrease in pain.  Tests/procedures: None during shift.    Mobility: Ambulated in hallway with 1-person assist and use of gait belt and walker.  Verbalized readiness for discharge, friend will drive patient home.    Plan:  Prepare for discharge.  Ensure patient understands follow-up cares and appointments.  Ensure patient understands and recognizes signs/symptoms that indicate a need for further medical consultation.

## 2023-01-01 NOTE — PLAN OF CARE
"Goal Outcome Evaluation:      Plan of Care Reviewed With: patient    Overall Patient Progress: no changeOverall Patient Progress: no change    Neuro: A&Ox4, BL numbness and tingling in legs and feet  Cardiac/Telemetry:  afib RVR, HR's  100s-110s  Respiratory: RA, CPAP at night when tolerated, dyspnea on exertion  GI/: pt initially declined straight cath after bladder scan of 345 mL, but was able to void x2 (both mixed with stool). Pt had two large, loose incont/cont BMs. Imodium requested and administered.     Endocrine: ACHS, snack coverage for night snack  Diet/Appetite: good appetite, reminded of fluid/dietary restriction  Skin: antifungal powder placed underneath folds. Lymphodema wraps intact on L lower leg, but pt requested to have top layer unwrapped this morning on R leg, she stated it was \"pinching\" and needed a break. Mepilex intact on L hip.   LDA: R PIV and L midline saline locked  Activity: 2 assist/SBA, pivot to commode   Pain: c/o leg pain, PRN's given as needed and charted in MAR      Plan:  -discharge today?      "

## 2023-01-01 NOTE — PLAN OF CARE
"Goal Outcome Evaluation:      Plan of Care Reviewed With: patient    Overall Patient Progress: improvingOverall Patient Progress: improving    Outcome Evaluation: Ambulated short distance with PT, verbalized understanding of need to cover carbs in snacks as well as meals      Patient admitted for heart failure exacerbation and hypervolemia, PMH of NICM with EF 40-45%, afib, DM2, HTN, HLD, JYOTI, neuropathy, and morbid obesity.     Neuro: A&O x4, denied dizziness.  Reported baseline numbness and tingling in legs and feet, tenderness decreased from admission.  Respiratory:  Had dyspnea on exertion and orthopnea, lung sounds diminished in middle and lower lung lobes.  Cardiac: Distant heart sounds noted.  Had 2-3+ edema in legs and feet, was given oral bumetanide.   GI: Denied nausea, bowel sounds hyperactive.  Had loose stool during shift, bowel medications held.  Reminded patient of dietary restrictions, patient stated that she will continue to eat foods high in sugar and sodium.  Provided education regarding effects of surpassing restrictions.  Patient verbalized understanding but also stated she would continue to eat what and how she wants, \"You might as well give me what I want, because I'm going to eat like that at home\".  : Had good urine output via phan catheter, see I&O flowsheet.  Phan catheter removed during shift, PureWick in place.  Skin: Skin breakdown under right breast healing, site pink and dry.  Applied miconazole powder per orders.   VS: In atrial fibrillation with rapid ventricular response, HR 90s-120s, SBP 90s-110s, SaO2 % on room air.  HR into 140s-150s while ambulating.  Drips:  None.    Electrolytes: Phosphorus and magnesium replaced per protocol.  Pain: Reported back and leg pain, was given prn analgesics and applied diclofenac gel to back. Patient reported a decrease in pain.  Tests/procedures: None during shift.    Mobility: Ambulated in hallway with 1-person assist and use of gait " belt and walker.    Plan:  Continue to monitor pain, VS, heart rhythm, skin integrity, fluid status, bowel status, cardiac and respiratory status.  Notify care team of changes in patient condition or other concerns.  Reinforce purpose/effect of dietary restrictions. Expected to discharge tomorrow.

## 2023-01-01 NOTE — CARE PLAN
Lymphedema Therapy Discharge Summary    Reason for therapy discharge:    Discharged to home.    Progress towards therapy goal(s). See goals on Care Plan in [facility] electronic health record for goal details.  Goals partially met.  Barriers to achieving goals:   discharge from facility.    Therapy recommendation(s):    Continued therapy is recommended.  Rationale/Recommendations:  Recommend pt continue managing BLE edema using gradient compression bandaging. Pt educated in donning technique w/ hand out to detail techniques for PCA assist. Pt sent home x2 sets wraps. .

## 2023-01-01 NOTE — PROGRESS NOTES
DISCHARGE   Discharged to: Home  Via: Automobile  Accompanied by: Friend  Discharge Instructions: principal diagnosis, major findings/procedures, diet, activity, medications, follow up appointments, when to call the MD, and what to watchout for (i.e. s/s of infection, increasing SOB, palpitations, Angina). Pt states understanding of all discharge instructions.   Prescriptions: To be filled at discharge pharmacy per pt's request; scripts sent to pharmacy.  Follow Up Appointments: with PCP in 3-5 days, with Cardiology in 1 week, Home Care within 2 days.  Belongings: All sent with pt. Pt verifies that they are taking all belongings with them.   IV: discontinued.   Telemetry: discontinued.   Pt exhibits understanding of above discharge instructions; all questions answered.  Discharge Paperwork: faxed to discharge planner.

## 2023-01-02 NOTE — TELEPHONE ENCOUNTER
LMTCB to check on patients blood sugars and insulin doses. Also sent my chart message  Valentine WILDER, RN, [facility]  Certified Diabetes Care and   [facility]in[facility]  [address] [place] [address]  Phone [phone]

## 2023-01-02 NOTE — PLAN OF CARE
Occupational Therapy Discharge Summary    Reason for therapy discharge:    Discharged to home.    Progress towards therapy goal(s). See goals on Care Plan in [facility] electronic health record for goal details.  Goals partially met.  Barriers to achieving goals:   discharge from facility.    Therapy recommendation(s):    Continued therapy is recommended.  Rationale/Recommendations:  Pt would benefit from continued therapy to continue progress with I/ADL completion & functional mobility, as well as OP CR for cardiopulmonary health.

## 2023-01-02 NOTE — TELEPHONE ENCOUNTER
----- Message from Marv Patel MD sent at 1/1/2023 10:01 AM CST -----  Regarding: patient  Pricilla is discharging from the hospital today. She has DM-2.     Prior to admission:  -Toujeo 225 units, aspart 100 units with meals, Ozempic 1 mg weekly    Because of reduced caloric intake she is needing significantly less insulin in the hospital and will discharge on:    -Toujeo 40 units at bedtime  -Aspart 10 units with each meal + additional correction scale insulin as follows based on pre-meal glucose      -140 - 160: + 1 unit     -161 - 180: + 2 units     -181 - 200: + 3 units, and so on (sensitivity of 20 mg/dL)  -Ozempic 1 mg weekly  -No empagliflozin given history of UTIs in the past 1 year     She has upcoming appointments with [facility] this month.    Hoping our CDE team can check in with her in the coming few days as she will likely need significant insulin up titration as eating habits go back to baseline. Pricilla is aware of this as well.    Thanks, Isidro

## 2023-01-02 NOTE — DISCHARGE SUMMARY
Advanced Heart Failure Discharge Summary  Pricilla Brown   MRN: [id number]  1975  ___________________________________          Date of Admission: 12/25/2022  Date of Discharge: 1/1/2023  Admitting Physician: Romeo Walters MD  Discharge Physician: John Balderrama MD  Discharging Service: Advanced Heart Failure  Primary Provider: Mario Lockhart         Discharge Follow-Up Plans:   - Follow up with heart failure team for general assessment and GDMT optimization on 1/11  - Follow up with endocrinology team on 1/31 for insulin adjustment  - Follow up with PCP in 1 month for levothyroxine adjustment if necessary         Primary and Secondary Diagnoses:   Acute on chronic HFrEF (EF 40-45%) 2/2 NICM (Stage C, NYHA Class IIII)  Atrial fibrillation w/ RVR, HR controlled  Insulin-dependent T2DM, uncontrolled c/b neuropathy  Elevated troponin 2/2 AoC HF and afib w/ RVR, peaked  NIRAJ on CKD III, resolved  Hypothyroidism, uncontrolled  HTN  COPD  JYOTI          History of Present Illness:   Pricilla Brown is a 47 year old female w/ PMHx significant for NICM (EF 40-45%), paroxysmal afib (on Eliquis), ID-T2DM, HTN, JYOTI, neuropathy, morbid obesity who presented to the ED on 12/25/2022 due to chest discomfort, palpitations, volume overload concerning for heart failure exacerbation and afib w/ RVR. Hospital course complicated by uncontrolled blood sugar.    From admission H&P:  Patient states that she developed increasing palpitations and chest discomfort yesterday. Feels like heart is beating harder and faster than normal. Notes that she intermittently gets mid-sternal chest discomfort, although not currently. No radiation. Feels tight. No change with exertion, position, inhalation/exhalation. Lasts for a few seconds only while having palpitations. No trigger. Patient also notes worsening SOB, MURRAY, exercise capacity (gets SOB with ambulation to bathroom, unable to walk  up flight of stairs), PND, orthopnea, LE edema, abdominal fullness for the past 3-4 days. Has not been taking her PTA medications on a regular basis, and has not taken any for the past few days while family has been visiting. Sleeps in chair or with 4-5 pillows. Does not follow low-salt diet. Reports lower PO intake. Has not used insulin or checked blood sugar. Does not know dry weight.          Hospital Course by Problem:    Acute on chronic HFrEF (EF 40-45%) 2/2 NICM (Stage C, NYHA Class IIII)  Patient presents with sxs of volume overload, including SOB, MURRAY, PND, orthopnea, increased abdominal girth, 3+ BLE pitting edema. NTproBNP 2788. CXR w/ pulmonary edema. Suspect 2/2 dietary and medication noncompliance vs. afib w/ RVR. Unclear dry weight, although weight at discharge from prior hospital admission (10/1/2022) was 462 lbs. Admission weight 515 lbs.  - Diuresis: increased Bumex to 5mg BID  - GDMT              - ACEi/ARB/ARNi: continue PTA hydralazine 12.5mg TID, Isordil 20mg TID               - Aldosterone Inhibitor: continue PTA spironolactone 50mg daily              - Beta blocker: metoprolol succinate 75mg daily (increased on 12/31)              - SGLT2i: discontinued empagliflozin per endocrinology; consider resuming if hx UTI and not  fungal infxn  - SCD: none  - CORE follow up scheduled on 1/11     Atrial fibrillation w/ RVR, HR controlled  [id number] 4. Patient has not been taking PTA metoprolol for a few days.  - Continue PTA apixaban 5mg BID  - Increased metoprolol succinate to 75mg daily     Insulin-dependent T2DM, uncontrolled c/b neuropathy  A1c 13.5%. Patient does not take insulin consistently at home. Unclear if insulin doses listed in home med list are accurate; started with lower doses similar to those at last hospitalization. Patient confirms that she takes her PTA insulin as prescribed, although later admitted that she usually eats significantly more at home. Blood sugar has been labile  throughout admission. Initially developped hypoglycemia necessitating D10 gtt. Significant reduction made in discharge insulin regimen.  - Endocrinology consulted; recs appreciated  - Discharge insulin regimen per endocrinology:  -Toujeo 40 units at bedtime  -Aspart 10 units with each meal + additional correction scale insulin as follows based on pre-meal glucose      -140 - 160: + 1 unit     -161 - 180: + 2 units     -181 - 200: + 3 units, and so on (sensitivity of 20 mg/dL)  -Resume Ozempic 1 mg weekly  -Do not resume empagliflozin given history of UTIs in the past 1 year - this can be readdressed as an outpatient at time of Endocrine follow-up      Elevated troponin 2/2 AoC HF and afib w/ RVR, peaked  Troponin 72 > 74 > 67. Low suspicion for ACS given resolution of chest pain and no ischemic changes seen on EKG. Chest pain lasts for seconds then dissipates and occurs with palpitations, likely representing discomfort from significant volume overload.    NIRAJ on CKD III, resolved  Cr 1.62 on admission. Baseline Cr approximately 1.3-1.4. Uptrend in Cr since 12/27, in the setting of excellent urine output, suggests NIRAJ from overdiuresis.    Hypothyroidism, uncontrolled  TSH 14.3 and fT4 0.53 on admission. Patient states that she has not taken PTA levothyroxine in > 1 week.  - Continue PTA levothyroxine 200mcg daily (400mcg on Sundays)  - Monitor with PCP         Physical Exam on day of Discharge:     Blood pressure (!) 88/77, pulse 120, temperature 97.4  F (36.3  C), temperature source Axillary, resp. rate 20, weight (!) 214.4 kg (472 lb 9.6 oz), SpO2 95 %, not currently breastfeeding.    Physical Exam:  GENERAL: No acute distress.  HEENT: EOMI.  NECK: Supple. JVP not visualized.  CV: S1/S2 heard without murmur. Distant heart sounds.  RESPIRATORY: Diminished at bases. No wheezing.  GI: Soft and non-distended with normoactive bowel sounds present in all quadrants. No tenderness, rebound, guarding.  EXTREMITIES: 2+ BLE  edema. Wraps in place.  NEUROLOGIC: Alert and orientated x 3. CN II-XII grossly intact. No focal deficits.  SKIN: No jaundice. No acute rashes or lesions.         Significant Laboratory Findings at Discharge:     CBC  Recent Labs   Lab 01/01/23  0711 12/31/22  0404 12/30/22  0634 12/29/22  0806   WBC 5.4 6.8 4.3 6.9   RBC 5.11 5.11 5.12 5.17   HGB 14.8 15.1 15.0 15.0   HCT 50.0* 48.3* 50.2* 50.3*   MCV 98 95 98 97   MCH 29.0 29.5 29.3 29.0   MCHC 29.6* 31.3* 29.9* 29.8*   RDW 16.8* 17.0* 17.1* 17.3*    149* 148* 148*       BMP  Recent Labs   Lab 01/01/23  1141 01/01/23  0752 01/01/23  0711 12/31/22  2145 12/31/22  1809 12/31/22  1242 12/31/22  0728 12/31/22  0404 12/30/22  1335 12/30/22  1326   NA  --   --  137  --   --  133*  --  137  --  134*   POTASSIUM  --   --  4.2  --   --  4.3  --  4.4  --  4.7   CHLORIDE  --   --  94*  --   --  91*  --  91*  --  89*   CO2  --   --  32*  --   --  31*  --  34*  --  36*   ANIONGAP  --   --  11  --   --  11  --  12  --  9   * 183* 207* 132*   < > 311*   < > 259*   < > 76   BUN  --   --  50.4*  --   --  50.1*  --  56.1*  --  58.5*   CR  --   --  1.66*  --   --  1.53*  --  1.61*  --  1.85*   GFRESTIMATED  --   --  38*  --   --  42*  --  39*  --  33*   JUSTIN  --   --  8.7  --   --  8.6  --  9.0  --  8.7   MAG  --   --  2.1  --   --  1.9  --  2.0  --  2.1   PHOS  --   --  3.0  --   --  2.3*  --  2.6  --  2.9    < > = values in this interval not displayed.        INRNo lab results found in last 7 days.    Liver panel  Recent Labs   Lab 01/01/23  0711 12/31/22  0404 12/30/22  0634 12/29/22  0806   PROTTOTAL 6.6 6.7 6.4 5.9*   ALBUMIN 2.7* 2.8* 2.6* 2.4*   BILITOTAL 0.6 0.6 0.8 0.6   ALKPHOS 157* 176* 162* 161*   AST 28 43* 36* 43*   ALT 27 38* 34 39*            Significant Imaging, Procedures and Other Findings:   CXR (12/25)  IMPRESSION:  1. Enlarged cardiomediastinal silhouette; consider ultrasound/cardiac  echo to evaluate for pericardial effusion.  2. Increased  bilateral perihilar and basilar opacities favored to  represent pulmonary edema versus infection.         Consultations:   Endocrinology         Discharge Medications:     Discharge Medication List as of 1/1/2023  2:22 PM      CONTINUE these medications which have CHANGED    Details   bumetanide (BUMEX) 1 MG tablet Take 5 tablets (5 mg) by mouth 2 times daily, Disp-180 tablet, R-3, E-Prescribe      ciclopirox (LOPROX) 0.77 % cream Apply topically 2 times daily To feet and toenails.Disp-90 g, [id number]      !! Continuous Blood Gluc Sensor (FREESTYLE BELLO 14 DAY SENSOR) MISC Change every 14 days., Disp-2 each, R-11, E-Prescribe      !! Continuous Blood Gluc Sensor (FREESTYLE BELLO 14 DAY SENSOR) MISC 1 each every 14 days, Disp-2 each, R-3, ROJELIO, E-Prescribe      !! Continuous Blood Gluc Sensor (FREESTYLE BELLO 2 SENSOR) MISC 1 Units every 14 days Check BG 4 times daily before meals and at bedtime., Disp-6 each, R-3, E-Prescribe      glucagon 1 MG SOLR injection Inject 1 mg Subcutaneous every 15 minutes as needed for low blood sugar, Disp-10 each, R-11, E-Prescribe      hydrALAZINE (APRESOLINE) 25 MG tablet Take 0.5 tablets (12.5 mg) by mouth 3 times daily, Disp-60 tablet, R-4, E-Prescribe      insulin aspart (NOVOLOG FLEXPEN) 100 UNIT/ML pen INJECT 10 UNITS UNDER THE SKIN WITH MEALS, PLUS CORRECTION. Additional correction scale insulin as follows based on pre-meal glucose: 140 - 160 = + 1 unit;  161 - 180 = + 2 units, 181 - 200 = + 3 units, 201-220 = +4 units, 221-240 = +5 units, 241-260 = + 6 units, 261-280 = +7 units, 281-300 = +8 units, Disp-330 mL, R-0, E-Prescribe      insulin glargine U-300 (TOUJEO MAX SOLOSTAR) 300 UNIT/ML (2 units dial) pen Inject subcu Toujeo 40 units at bedtime, Disp-40 mL, R-11, E-Prescribe      metoprolol succinate ER (TOPROL XL) 25 MG 24 hr tablet Take 3 tablets (75 mg) by mouth daily, Disp-90 tablet, R-3, E-Prescribe      spironolactone (ALDACTONE) 50 MG tablet Take 1 tablet (50 mg)  by mouth daily, Disp-90 tablet, R-3, E-Prescribe       !! - Potential duplicate medications found. Please discuss with provider.      CONTINUE these medications which have NOT CHANGED    Details   !! ACCU-CHEK RON PLUS test strip USE TO TEST BLOOD SUGAR FOUR TIMES A DAY  OR AS DIRECTED., Disp-360 strip, R-2, ROJELIO, E-Prescribe      acetaminophen (TYLENOL) 325 MG tablet Take 2 tablets (650 mg) by mouth 3 times daily as needed for mild pain or fever (total acetaminophen dose should not exceed 3000 mg per day), Disp-180 tablet, R-0, E-PrescribeCall clinic to schedule follow up appointment.[phone]      albuterol (PROVENTIL) (2.5 MG/3ML) 0.083% neb solution Take 1 vial (2.5 mg) by nebulization every 6 hours as needed for shortness of breath / dyspnea or wheezing, Disp-90 mL, R-1, E-Prescribe      albuterol (VENTOLIN HFA) 108 (90 Base) MCG/ACT inhaler Inhale 2 puffs into the lungs every 6 hours as needed for shortness of breath / dyspnea, Disp-18 g, R-1, E-PrescribePharmacy may dispense brand covered by insurance (Proair, or proventil or ventolin or generic albuterol inhaler)      apixaban ANTICOAGULANT (ELIQUIS) 5 MG tablet Take 1 tablet (5 mg) by mouth 2 times daily, Disp-180 tablet, R-3, E-Prescribe      atorvastatin (LIPITOR) 40 MG tablet Take 1 tablet (40 mg) by mouth daily, Disp-90 tablet, R-3, E-Prescribe      bisacodyl (DULCOLAX) 10 MG suppository Place 1 suppository (10 mg) rectally daily as needed for constipation, Disp-6 suppository, R-0, E-Prescribe      blood glucose (NO BRAND SPECIFIED) lancets standard USE TO CHECK  blood sugar fasting each am  prelunch and predinner daily OR AS DIRECTED Call clinic to schedule MD APPT.Disp-400 each, [id number]      !! blood glucose (NO BRAND SPECIFIED) test strip Use to test blood sugar 4 times daily or as directed. Accu check Ron plus, Disp-400 strip, R-1, E-Prescribe      !! blood glucose (NO BRAND SPECIFIED) test strip Use to test blood sugar 4 times daily or  as directed., Disp-400 strip, R-3, E-PrescribeFor accu chek adi      !! blood glucose (NO BRAND SPECIFIED) test strip Use to test blood sugars 3 times daily or as directed, Disp-400 strip, R-3, E-Prescribe      blood glucose monitoring (ACCU-CHEK FASTCLIX) lancets Use to test blood sugar 4 times daily or as directed.or lancets that go with meter being used, Disp-360 each, R-3, E-Prescribe      blood glucose monitoring (IBG STAR) meter device kit -PLEASE GIVE PATIENT A DEVICE HER INSURANCE WILL COVER-, Disp-1 kit, R-0, ROJELIO, E-Prescribe      !! blood glucose monitoring (NO BRAND SPECIFIED) meter device kit Use to test blood sugar 4  times daily or as directed.Disp-1 kit, [id number]      !! blood glucose monitoring (NO BRAND SPECIFIED) meter device kit Use to test blood sugar 3 times daily or as directed.Disp-1 kit, [id number]      Blood Pressure KIT Use to check blood pressure as directed, once daily and as needed. Record results., Disp-1 kit, R-1, E-Prescribe      capsaicin (ZOSTRIX) 0.025 % external cream Apply 1 g topically 3 times daily as needed (use for neuropathy pain), Disp-1 Tube, R-1, E-Prescribe      ciclopirox (PENLAC) 8 % external solution Apply topically daily To toenails.Disp-6.6 mL, [id number]      clotrimazole (LOTRIMIN) 1 % external cream Apply topically 2 times daily as needed (skin irritation)Disp-30 g, [id number]      !! colchicine (COLCYRS) 0.6 MG tablet Take 1 tablet (0.6 mg) by mouth 2 times daily, Disp-12 tablet, R-0, E-Prescribe      !! colchicine (COLCYRS) 0.6 MG tablet Take 1-2 tablets (0.6-1.2 mg) by mouth daily as needed for moderate pain, Disp-180 tablet, R-0, E-PrescribeCall clinic to schedule follow up appointment.[phone]      diclofenac (VOLTAREN) 1 % topical gel Apply 4 grams to knees or 2 grams to hands four times daily using enclosed dosing card., Disp-100 g, R-1, E-PrescribeCall clinic to schedule follow up appointment.[phone]      DULoxetine (CYMBALTA)  20 MG capsule Take 1 capsule (20 mg) by mouth 2 times daily, Disp-60 capsule, R-0, E-Prescribe      Efinaconazole 10 % SOLN Externally apply topically daily To toenails.Disp-8 mL, [id number] patient due for follow up with provider      gabapentin (NEURONTIN) 300 MG capsule Take 2 capsules (600 mg) by mouth 3 times daily, Disp-270 capsule, R-1, E-Prescribe      hydrochlorothiazide (HYDRODIURIL) 12.5 MG tablet Take 1 tablet (12.5 mg) by mouth 2 times daily, Disp-180 tablet, R-3, E-Prescribe      insulin pen needle (BD RIVER U/F) 32G X 4 MM miscellaneous Use 6 daily or as directedDisp-600 each, [id number]-[id number]      isosorbide dinitrate (ISORDIL) 20 MG tablet Take 1 tablet (20 mg) by mouth 3 times daily, Disp-270 tablet, R-0, E-Prescribe      ketorolac (ACULAR) 0.5 % ophthalmic solution Place 1 Drop into right eye 3 times daily., Disp-5 mL, R-1, E-Prescribe      levothyroxine (SYNTHROID/LEVOTHROID) 200 MCG tablet Take one tablet (200mg) by mouth daily Monday through Friday, and take two tablets (400mcg) on Saturdays and Sundays, Disp-160 tablet, R-3, E-Prescribe      nicotine (NICODERM CQ) 14 MG/24HR 24 hr patch Place 1 patch onto the skin every 24 hours, Disp-90 patch, R-0, E-Prescribe      nystatin (MYCOSTATIN) 134301 UNIT/GM external cream Apply topically 2 times daily To toenailsDisp-90 g, [id number]      !! order for DME Left footDisp-1 Units, R-0, Local Print      !! order for DME Equipment being ordered: [id number] $92   Plantar, fasciitis, LG, night splintDisp-1 each, R-0, No Print Out      !! order for DME Equipment being ordered: Challenger Wide walker if available - patient needs seat, basket and brakes.Disp-1 each, R-0, Local Print      !! ORDER FOR DME Use your CPAP device as directed by your provider. Pressure change to min 13 max 18cwpDisp-1 each, R-99, Local Print      oxyCODONE-acetaminophen (PERCOCET) 5-325 MG per tablet Take 1 tablet by mouth every 8 hours as needed for moderate to  severe pain (try to limit use, no further prescriptions until seen in pain clinic), Disp-60 tablet, R-0, Local Print      polyethylene glycol (MIRALAX/GLYCOLAX) powder Historical      prednisoLONE acetate (PRED FORTE) 1 % ophthalmic suspension As directed 1 Drop 3 times daily. Follow physician instructions for the operative eye., Disp-5 mL, R-1, E-Prescribe      Respiratory Therapy Supplies (NEBULIZER COMPRESSOR) KIT 1 Device 4 times daily as needed., Disp-1 kit, R-3, Fax      Semaglutide, 1 MG/DOSE, 4 MG/3ML SOPN Inject 1 mg Subcutaneous once a week, Disp-9 mL, R-3, E-Prescribe      senna (SENOKOT) 8.6 MG tablet Take 1 tablet by mouth 2 times daily, Disp-45 tablet, R-0, E-Prescribe      tiotropium (SPIRIVA HANDIHALER) 18 MCG inhalation capsule Inhale contents of one capsule daily., Disp-30 capsule, R-1, Fax      topiramate (TOPAMAX) 25 MG tablet 25 mg at bedtime for 1 week, 50 mg at bedtime for 1 week and 75 mg daily at bedtime thereafter, Disp-180 tablet, R-1, E-Prescribe       !! - Potential duplicate medications found. Please discuss with provider.      STOP taking these medications       empagliflozin (JARDIANCE) 25 MG TABS tablet Comments:   Reason for Stopping:         HUMULIN R U-500 KWIKPEN 500 UNIT/ML PEN soln Comments:   Reason for Stopping:         potassium chloride ER (KLOR-CON M) 20 MEQ CR tablet Comments:   Reason for Stopping:                  Disposition / Condition on Discharge:   Disposition: Home with home care  Discharge Condition: Stable  Code Status on Discharge: Full         Discharge Orders:     Discharge Procedure Orders   Medication Therapy Management Referral   Referral Priority: Routine Referral Type: Med Therapy Management   Requested Specialty: Pharmacist   Number of Visits Requested: 1     Home Care Referral   Referral Priority: Routine: Next available opening Referral Type: Home Health Therapies & Aides   Number of Visits Requested: 1     Cardiac Rehab Referral   Standing Status:  Future   Referral Priority: Routine Referral Type: Rehab Therapy Cardiac Therapy   Number of Visits Requested: 1     Home Care Referral   Referral Priority: Routine: Next available opening Referral Type: Home Health Therapies & Aides   Number of Visits Requested: 1     Orthopedic  Referral   Standing Status: Future   Referral Priority: Routine: Next available opening Referral Type: Consultation   Number of Visits Requested: 1     Follow Up ([facility])   Order Comments: Please schedule appointment with ADALBERTO within 7-10 days of discharge    Appointments on [place] and/or [facility] (with [facility] or [facility] provider or service). Call [phone] if you haven't heard regarding these appointments within 7 days of discharge.     Reason for your hospital stay   Order Comments: Heart failure exacerbation, atrial fibrillation with rapid ventricular response, uncontrolled diabetes     Activity   Order Comments: Your activity upon discharge: activity as tolerated     Order Specific Question Answer Comments   Is discharge order? Yes      Monitor and record   Order Comments: Weigh yourself every morning     When to contact your care team   Order Comments: Contact your care team If symptoms get worse, If increased shortness of breath, If waking up at night with difficulty breathing, If unable to lie down for sleep due to symptoms, If weight gain of 2 pounds a day for 2 days in a row OR 5 pounds in 1 week., Increased swelling in your ankles or legs, and Dizziness or lightheadedness     Discharge Follow Up - with Primary Care clinic within 3-5 days (RN to schedule prior to d/c for HE/Entira primary care     Add follow up information to the AVS prior to printing     Follow Up ([facility])   Order Comments: Follow up with primary care provider, Mario Lockhart, within 7 days for hospital follow- up. You will also follow up with cardiology (heart failure) and endocrinology (diabetes) - these teams will check in with you in a few  days.    Appointments on [address] and/or [facility] (with [facility] or [facility] provider or service). Call [phone] if you haven't heard regarding these appointments within 7 days of discharge.     Diet   Order Comments: Follow this diet upon discharge: Orders Placed This Encounter      Fluid restriction 1500 ML FLUID      Combination Diet Moderate Consistent Carb (60 g CHO per Meal) Diet; 2 gm NA Diet; Low Saturated Fat Diet     Order Specific Question Answer Comments   Is discharge order? Yes         Patient discussed with my attending physician, Dr. Balderrama, who agrees with the assessment and plan.    30 minutes was spent on discharge and the coordination of care.    Madeline Vasquez MD  Internal Medicine, PGY-1

## 2023-01-02 NOTE — PLAN OF CARE
Physical Therapy Discharge Summary    Reason for therapy discharge:    Discharged to home with home therapy.    Progress towards therapy goal(s). See goals on Care Plan in [facility] electronic health record for goal details.  Goals partially met.  Barriers to achieving goals:   discharge from facility.    Therapy recommendation(s):    Continued therapy is recommended.  Rationale/Recommendations:  home PT to maximize functional IND, safety.

## 2023-01-03 NOTE — TELEPHONE ENCOUNTER
LMTCB    Valentine CHRISN, RN, [facility]  Certified Diabetes Care and   [facility] Endocrinology and Diabetes   Clinics and Surgery Center  [address] [place] [address]  Phone [phone]

## 2023-01-03 NOTE — TELEPHONE ENCOUNTER
MTM referral from: Transitions of Care (recent hospital discharge or ED visit)    MTM referral outreach attempt #2 on January 3, 2023 at 9:10 AM      Outcome: Patient not reachable after several attempts, will route to MT Pharmacist/Provider as an FYI.  [facility] scheduling number is [phone].  Thank you for the referral.    Sanam Rojas, [facility]     Patient has MARISOL coverage

## 2023-01-04 NOTE — TELEPHONE ENCOUNTER
I called Pricilla to follow up post hospitalization. Hospitalized 12/25 - 11 for acute on chronic heart failure. Had seen her in hospital and also discussed possibility of remote monitoring to get her a scale. Will discuss more at her follow up visit on 1/11. Left detailed voicemail and asked for call back if questions or symptoms. Amelia Lowry RN

## 2023-01-04 NOTE — TELEPHONE ENCOUNTER
LMTCB.  3rd try via phone and 1 Floovedhart message with no response    Valentine CHRISN, RN, [facility]  Certified Diabetes Care and   [facility] Endocrinology and Diabetes   Clinics and Surgery Center  [address] [place] [address]  Phone [phone]

## 2023-01-09 NOTE — TELEPHONE ENCOUNTER
Closing encounter unable to reach pt for appt. She has an appt on 1/24 with Karen Velasquez. NANETTE CHRISN, RN, [facility]  Certified Diabetes Care and   [facility] Endocrinology and Diabetes  [facility] and Surgery Center  [address] [place] [address]  Phone [phone]

## 2023-01-20 NOTE — TELEPHONE ENCOUNTER
Called patient and unable to leave voicemail due to mailbox full. Patient has an appointment on 1/24/2023. Need to collect the last 14 days worth of blood sugar readings to prepare for patient's visit.     Shelbi Reyez LPN 01/20/23 2:05 PM

## 2023-01-23 PROBLEM — R79.89 ELEVATED TROPONIN: Status: ACTIVE | Noted: 2023-01-01

## 2023-01-23 PROBLEM — R06.02 SHORTNESS OF BREATH: Status: ACTIVE | Noted: 2023-01-01

## 2023-01-23 PROBLEM — I50.21 ACUTE HFREF (HEART FAILURE WITH REDUCED EJECTION FRACTION) (H): Status: ACTIVE | Noted: 2023-01-01

## 2023-01-23 PROBLEM — I48.20 CHRONIC ATRIAL FIBRILLATION (H): Status: ACTIVE | Noted: 2023-01-01

## 2023-01-23 NOTE — ED PROVIDER NOTES
ED Provider Note  [facility]      History     Chief Complaint   Patient presents with     Shortness of Breath     HPI  Pricilla Brown is a 47 year old female with a past history of obesity, asthma, DM2, HTN, persistent atrial fibrillation - on Eliquis, chronic HFrEF, NICM (EF 20% by echo 9/2022), normal coronary angiography 1/2012, Graves dz s/p radioiodine ablation - now hypothyroid, JYOTI, CKD who presents via EMS for complaints of shortness of breath. She reports since her discharge from the hospital earlier this month she had been doing well, attending physical therapy, taking her home medications. But over the past 24-48 hours she developed shortness of breath, and lower extremity feet swelling. She also has noticed her great toe on her right foot is darker in color.    Reports her abdomen does feel bloated in addition to her feet being swollen. She denies fevers, or chills. Reports she has been medication compliant with her diuretics and metoprolol.    Past Medical History  Past Medical History:   Diagnosis Date     A-fib (H) 2011    on coumadin since 1/13     Asthma     as a kid     Chest pain 2/1/2017     Chronic anticoagulation for a-fib 2/15/2013    INR's followed by coumadin clinic at      Diabetes mellitus (H) 2012     Diastolic heart failure 2/15/2013     Dilated cardiomyopathy (H) 1/8/2013     HTN (hypertension)      Hyperthyroidism     Graves, s/p I131 1/13, now on prednisone and methimazole     Mixed type age-related cataract, both eyes 4/8/2022     Morbid obesity (H)      Pulmonary embolism (H) 1/12    hospitalized in [place], on lovenox/coumadin for a few months but stopped, hypercoag w/u neg per pt     Sleep apnea     using CPAP     History reviewed. No pertinent surgical history.  acetaminophen (TYLENOL) 325 MG tablet  albuterol (PROVENTIL) (2.5 MG/3ML) 0.083% neb solution  albuterol (VENTOLIN HFA) 108 (90 Base) MCG/ACT inhaler  apixaban ANTICOAGULANT (ELIQUIS) 5 MG  tablet  atorvastatin (LIPITOR) 40 MG tablet  bisacodyl (DULCOLAX) 10 MG suppository  bumetanide (BUMEX) 1 MG tablet  capsaicin (ZOSTRIX) 0.025 % external cream  ciclopirox (LOPROX) 0.77 % cream  ciclopirox (PENLAC) 8 % external solution  clotrimazole (LOTRIMIN) 1 % external cream  colchicine (COLCYRS) 0.6 MG tablet  colchicine (COLCYRS) 0.6 MG tablet  diclofenac (VOLTAREN) 1 % topical gel  DULoxetine (CYMBALTA) 20 MG capsule  Efinaconazole 10 % SOLN  gabapentin (NEURONTIN) 300 MG capsule  glucagon 1 MG SOLR injection  hydrALAZINE (APRESOLINE) 25 MG tablet  hydrochlorothiazide (HYDRODIURIL) 12.5 MG tablet  insulin aspart (NOVOLOG FLEXPEN) 100 UNIT/ML pen  insulin glargine U-300 (TOUJEO MAX SOLOSTAR) 300 UNIT/ML (2 units dial) pen  insulin pen needle (BD RIVER U/F) 32G X 4 MM miscellaneous  isosorbide dinitrate (ISORDIL) 20 MG tablet  ketorolac (ACULAR) 0.5 % ophthalmic solution  levothyroxine (SYNTHROID/LEVOTHROID) 200 MCG tablet  metoprolol succinate ER (TOPROL XL) 25 MG 24 hr tablet  nicotine (NICODERM CQ) 14 MG/24HR 24 hr patch  nystatin (MYCOSTATIN) 038009 UNIT/GM external cream  oxyCODONE-acetaminophen (PERCOCET) 5-325 MG per tablet  polyethylene glycol (MIRALAX/GLYCOLAX) powder  prednisoLONE acetate (PRED FORTE) 1 % ophthalmic suspension  senna (SENOKOT) 8.6 MG tablet  spironolactone (ALDACTONE) 50 MG tablet  tiotropium (SPIRIVA HANDIHALER) 18 MCG inhalation capsule  topiramate (TOPAMAX) 25 MG tablet  ACCU-CHEK RON PLUS test strip  blood glucose (NO BRAND SPECIFIED) lancets standard  blood glucose (NO BRAND SPECIFIED) test strip  blood glucose (NO BRAND SPECIFIED) test strip  blood glucose (NO BRAND SPECIFIED) test strip  blood glucose monitoring (ACCU-CHEK FASTCLIX) lancets  blood glucose monitoring (IBG STAR) meter device kit  blood glucose monitoring (NO BRAND SPECIFIED) meter device kit  blood glucose monitoring (NO BRAND SPECIFIED) meter device kit  Blood Pressure KIT  Continuous Blood Gluc Sensor  "(FREESTYLE BELLO 14 DAY SENSOR) MISC  Continuous Blood Gluc Sensor (FREESTYLE BELLO 14 DAY SENSOR) MISC  Continuous Blood Gluc Sensor (FREESTYLE BELLO 2 SENSOR) MISC  order for DME  order for DME  order for DME  ORDER FOR DME  Respiratory Therapy Supplies (NEBULIZER COMPRESSOR) KIT  Semaglutide, 1 MG/DOSE, 4 MG/3ML SOPN      Allergies   Allergen Reactions     Penicillins Other (See Comments) and Unknown     CHILDHOOD ALLERGY  CHILDHOOD ALLERGY     Ibuprofen Hives and Rash     Ibuprofen Sodium Hives and Rash     Family History  Family History   Problem Relation Age of Onset     Thyroid Disease Mother         Grave's D     Diabetes Mother      Heart Disease Mother      Cerebrovascular Disease Mother         dec. 56 yo     Hypertension Mother      Heart Disease Sister         Heart Murmur     Diabetes Sister      Heart Disease Father         dec in his 30s, MI     Psychotic Disorder Brother         Bipolar     Diabetes Brother      Thyroid Disease Brother         Hyper Thyroid     Heart Disease Brother      Thyroid Disease Sister         Hyper Thyroid     Thyroid Disease Sister         Hyper Thyroid     Thyroid Disease Sister         Mental Health Problems     Thyroid Disease Maternal Aunt      Thyroid Disease Maternal Uncle      Cancer No family hx of      Glaucoma No family hx of      Macular Degeneration No family hx of      Social History   Social History     Tobacco Use     Smoking status: Light Smoker     Packs/day: 0.25     Years: 13.00     Pack years: 3.25     Types: Cigarettes     Smokeless tobacco: Never     Tobacco comments:     down to 2 cigs a day   Substance Use Topics     Alcohol use: No     Drug use: No         A medically appropriate review of systems was performed with pertinent positives and negatives noted in the HPI, and all other systems negative.    Physical Exam   BP: (!) 120/98  Pulse: 120  Temp: 98.9  F (37.2  C)  Resp: 16  Height: 170.2 cm (5' 7\")  Weight: (!) 214.1 kg (472 lb)  SpO2: 93 " %  Physical Exam  Vitals and nursing note reviewed.   Constitutional:       Appearance: She is obese.   HENT:      Head: Normocephalic.      Mouth/Throat:      Mouth: Mucous membranes are moist.   Eyes:      Pupils: Pupils are equal, round, and reactive to light.   Cardiovascular:      Rate and Rhythm: Tachycardia present. Rhythm irregular.      Pulses:           Radial pulses are 2+ on the right side and 2+ on the left side.        Dorsalis pedis pulses are 2+ on the right side and 2+ on the left side.        Posterior tibial pulses are 2+ on the right side and 2+ on the left side.      Heart sounds: No murmur heard.  Pulmonary:      Effort: Pulmonary effort is normal.      Breath sounds: Wheezing present.   Chest:      Chest wall: No mass.   Abdominal:      Palpations: Abdomen is soft.      Tenderness: There is no abdominal tenderness.   Musculoskeletal:      Cervical back: Normal range of motion and neck supple.      Right lower leg: 3+ Edema present.      Left lower leg: 3+ Edema present.   Feet:      Comments: Great toe on right foot darker in color than foot.  Skin:     General: Skin is warm.   Neurological:      Mental Status: She is alert and oriented to person, place, and time.           ED Course, Procedures, & Data      Procedures       ED Course Selections:        EKG Interpretation:      Interpreted by YOCASTA Rizo CNP  Time reviewed: 1525  Symptoms at time of EKG: SOB   Rhythm: atrial fibrillation - rapid  Rate: Tachycardia  Axis: Normal  Ectopy: none  Conduction: normal  ST Segments/ T Waves: No acute ischemic changes  Q Waves: none  Comparison to prior: Unchanged from 12/25/23  Clinical Impression: atrial fibrillation (chronic)     Results for orders placed or performed during the hospital encounter of 01/23/23   XR Chest Port 1 View     Status: None    Narrative    Exam: XR CHEST PORT 1 VIEW, 1/23/2023 4:05 PM    Indication: SOB, wheezing, hx of heart failure.    Comparison: Chest x-ray  12/25/2022    Findings:   AP portable semiupright view of the chest. Trachea is midline. Cardiac  silhouette is enlarged. Indistinct pulmonary vasculature. Perihilar  and bibasilar mixed interstitial and airspace opacities. Bilateral  costophrenic angle blunting. No pneumothorax.      Impression    Impression:   1. Cardiomegaly with perihilar and bibasilar mixed interstitial and  airspace opacities, likely secondary to cardiogenic pulmonary edema.  2. Small bilateral pleural effusions.    I have personally reviewed the examination and initial interpretation  and I agree with the findings.    OLLIE DAVALOS MD         SYSTEM ID:  [id number]   Comprehensive metabolic panel     Status: Abnormal   Result Value Ref Range    Sodium 135 (L) 136 - 145 mmol/L    Potassium 5.0 3.4 - 5.3 mmol/L    Chloride 99 98 - 107 mmol/L    Carbon Dioxide (CO2) 21 (L) 22 - 29 mmol/L    Anion Gap 15 7 - 15 mmol/L    Urea Nitrogen 56.2 (H) 6.0 - 20.0 mg/dL    Creatinine 1.56 (H) 0.51 - 0.95 mg/dL    Calcium 9.1 8.6 - 10.0 mg/dL    Glucose 346 (H) 70 - 99 mg/dL    Alkaline Phosphatase 120 (H) 35 - 104 U/L    AST 23 10 - 35 U/L    ALT 16 10 - 35 U/L    Protein Total 6.8 6.4 - 8.3 g/dL    Albumin 3.0 (L) 3.5 - 5.2 g/dL    Bilirubin Total 1.6 (H) <=1.2 mg/dL    GFR Estimate 41 (L) >60 mL/min/1.73m2   Troponin T, High Sensitivity     Status: Abnormal   Result Value Ref Range    Troponin T, High Sensitivity 75 (H) <=14 ng/L   Magnesium     Status: Abnormal   Result Value Ref Range    Magnesium 1.6 (L) 1.7 - 2.3 mg/dL   Nt probnp inpatient (BNP)     Status: Abnormal   Result Value Ref Range    N terminal Pro BNP Inpatient 5,891 (H) 0 - 450 pg/mL   CRP inflammation     Status: Abnormal   Result Value Ref Range    CRP Inflammation 40.70 (H) <5.00 mg/L   [place] Draw     Status: None ()    Narrative    The following orders were created for panel order [place] Draw.  Procedure                               Abnormality         Status                      ---------                               -----------         ------                     Extra Blue Top Tube[[id number]]                                                         Extra Red Top Tube[[id number]]                                                          Extra Green Top (Lithium...[[id number]]                                                 Extra Purple Top Tube[[id number]]                                                         Please view results for these tests on the individual orders.   [place] Draw     Status: None    Narrative    The following orders were created for panel order [place] Draw.  Procedure                               Abnormality         Status                     ---------                               -----------         ------                     Extra Blue Top Tube[[id number]]                              Final result               Extra Red Top Tube[[id number]]                               Final result               Extra Green Top (Lithium...[[id number]]                      Final result               Extra Purple Top Tube[[id number]]                            Final result                 Please view results for these tests on the individual orders.   CBC with platelets and differential     Status: Abnormal   Result Value Ref Range    WBC Count 9.4 4.0 - 11.0 10e3/uL    RBC Count 5.22 (H) 3.80 - 5.20 10e6/uL    Hemoglobin 15.6 11.7 - 15.7 g/dL    Hematocrit 49.8 (H) 35.0 - 47.0 %    MCV 95 78 - 100 fL    MCH 29.9 26.5 - 33.0 pg    MCHC 31.3 (L) 31.5 - 36.5 g/dL    RDW 18.9 (H) 10.0 - 15.0 %    Platelet Count 257 150 - 450 10e3/uL    % Neutrophils 72 %    % Lymphocytes 17 %    % Monocytes 8 %    % Eosinophils 0 %    % Basophils 1 %    % Immature Granulocytes 2 %    NRBCs per 100 WBC 0 <1 /100    Absolute Neutrophils 6.8 1.6 - 8.3 10e3/uL    Absolute Lymphocytes 1.6 0.8 - 5.3 10e3/uL    Absolute Monocytes 0.7 0.0 - 1.3 10e3/uL    Absolute Eosinophils 0.0 0.0 - 0.7 10e3/uL    Absolute  Basophils 0.1 0.0 - 0.2 10e3/uL    Absolute Immature Granulocytes 0.2 <=0.4 10e3/uL    Absolute NRBCs 0.0 10e3/uL   Extra Blue Top Tube     Status: None   Result Value Ref Range    Hold Specimen JIC    Extra Red Top Tube     Status: None   Result Value Ref Range    Hold Specimen JIC    Extra Green Top (Lithium Heparin) Tube     Status: None   Result Value Ref Range    Hold Specimen JIC    Extra Purple Top Tube     Status: None   Result Value Ref Range    Hold Specimen JIC    Asymptomatic Influenza A/B & SARS-CoV2 (COVID-19) Virus PCR Multiplex Nasopharyngeal     Status: Normal    Specimen: Nasopharyngeal; Swab   Result Value Ref Range    Influenza A PCR Negative Negative    Influenza B PCR Negative Negative    RSV PCR Negative Negative    SARS CoV2 PCR Negative Negative    Narrative    Testing was performed using the Xpert Xpress CoV2/Flu/RSV Assay on the Discrete Sportpert Instrument. This test should be ordered for the detection of SARS-CoV-2 and influenza viruses in individuals who meet clinical and/or epidemiological criteria. Test performance is unknown in asymptomatic patients. This test is for in vitro diagnostic use under the FDA EUA for laboratories certified under CLIA to perform high or moderate complexity testing. This test has not been FDA cleared or approved. A negative result does not rule out the presence of PCR inhibitors in the specimen or target RNA in concentration below the limit of detection for the assay. If only one viral target is positive but coinfection with multiple targets is suspected, the sample should be re-tested with another FDA cleared, approved, or authorized test, if coinfection would change clinical management. This test was validated by the [facility]. These laboratories are certified under the Clinical Laboratory Improvement Amendments of 1988 (CLIA-88) as qualified to perform high complexity laboratory testing.   TSH with free T4 reflex     Status: Abnormal    Result Value Ref Range    TSH 20.40 (H) 0.30 - 4.20 uIU/mL   T4 free     Status: Abnormal   Result Value Ref Range    Free T4 0.82 (L) 0.90 - 1.70 ng/dL   Troponin T, High Sensitivity     Status: Abnormal   Result Value Ref Range    Troponin T, High Sensitivity 60 (H) <=14 ng/L   EKG 12-lead, tracing only     Status: None   Result Value Ref Range    Systolic Blood Pressure  mmHg    Diastolic Blood Pressure  mmHg    Ventricular Rate 120 BPM    Atrial Rate 92 BPM    OH Interval  ms    QRS Duration 74 ms     ms    QTc 443 ms    P Axis  degrees    R AXIS 209 degrees    T Axis 94 degrees    Interpretation ECG       Undetermined rhythm  Low voltage QRS  Possible Anterolateral infarct , age undetermined  Abnormal ECG  Unconfirmed report - interpretation of this ECG is computer generated - see medical record for final interpretation  Confirmed by - EMERGENCY ROOM, PHYSICIAN ([id number]),  AIRAM ROGER ([id number]) on 1/23/2023 3:26:45 PM     CBC with platelets differential     Status: Abnormal    Narrative    The following orders were created for panel order CBC with platelets differential.  Procedure                               Abnormality         Status                     ---------                               -----------         ------                     CBC with platelets and d...[[id number]]  Abnormal            Final result                 Please view results for these tests on the individual orders.     Medications   Patient is already receiving anticoagulation with heparin, enoxaparin (LOVENOX), warfarin (COUMADIN)  or other anticoagulant medication (has no administration in time range)   Reason beta blocker not prescribed (has no administration in time range)   Reason ACE/ARB/ARNI order not selected (has no administration in time range)   albuterol (PROVENTIL) neb solution 2.5 mg (has no administration in time range)   albuterol (PROVENTIL HFA/VENTOLIN HFA) inhaler (has no administration in time range)    apixaban ANTICOAGULANT (ELIQUIS) tablet 5 mg (has no administration in time range)   atorvastatin (LIPITOR) tablet 40 mg (has no administration in time range)   bisacodyl (DULCOLAX) suppository 10 mg (has no administration in time range)   miconazole (MICATIN) 2 % cream (has no administration in time range)   clotrimazole (LOTRIMIN) 1 % cream (has no administration in time range)   ciclopirox (PENLAC) 8 % (has no administration in time range)   colchicine (COLCYRS) tablet 0.6-1.2 mg (has no administration in time range)   Efinaconazole 10 % SOLN (has no administration in time range)   gabapentin (NEURONTIN) capsule 600 mg (has no administration in time range)   insulin glargine U-300 (TOUJEO MAX) injection 30 Units (has no administration in time range)   ketorolac (ACULAR) 0.5 % ophthalmic solution 1 drop (has no administration in time range)   nicotine (NICODERM CQ) 14 MG/24HR 24 hr patch 1 patch (has no administration in time range)     And   nicotine Patch in Place (has no administration in time range)   nystatin (MYCOSTATIN) cream (has no administration in time range)   oxyCODONE-acetaminophen (PERCOCET) 5-325 MG per tablet 1 tablet (has no administration in time range)   senna (SENOKOT) tablet 1 tablet (has no administration in time range)   spironolactone (ALDACTONE) tablet 50 mg (has no administration in time range)   umeclidinium (INCRUSE ELLIPTA) 62.5 MCG/ACT inhaler 1 puff (has no administration in time range)   topiramate (TOPAMAX) tablet 75 mg (has no administration in time range)   bumetanide (BUMEX) injection 5 mg (5 mg Intravenous Given 1/23/23 1706)   levalbuterol (XOPENEX) neb solution 0.31 mg (0.31 mg Nebulization Not Given 1/23/23 1832)     Labs Ordered and Resulted from Time of ED Arrival to Time of ED Departure   COMPREHENSIVE METABOLIC PANEL - Abnormal       Result Value    Sodium 135 (*)     Potassium 5.0      Chloride 99      Carbon Dioxide (CO2) 21 (*)     Anion Gap 15      Urea Nitrogen  56.2 (*)     Creatinine 1.56 (*)     Calcium 9.1      Glucose 346 (*)     Alkaline Phosphatase 120 (*)     AST 23      ALT 16      Protein Total 6.8      Albumin 3.0 (*)     Bilirubin Total 1.6 (*)     GFR Estimate 41 (*)    TROPONIN T, HIGH SENSITIVITY - Abnormal    Troponin T, High Sensitivity 75 (*)    MAGNESIUM - Abnormal    Magnesium 1.6 (*)    NT PROBNP INPATIENT - Abnormal    N terminal Pro BNP Inpatient 5,891 (*)    CRP INFLAMMATION - Abnormal    CRP Inflammation 40.70 (*)    CBC WITH PLATELETS AND DIFFERENTIAL - Abnormal    WBC Count 9.4      RBC Count 5.22 (*)     Hemoglobin 15.6      Hematocrit 49.8 (*)     MCV 95      MCH 29.9      MCHC 31.3 (*)     RDW 18.9 (*)     Platelet Count 257      % Neutrophils 72      % Lymphocytes 17      % Monocytes 8      % Eosinophils 0      % Basophils 1      % Immature Granulocytes 2      NRBCs per 100 WBC 0      Absolute Neutrophils 6.8      Absolute Lymphocytes 1.6      Absolute Monocytes 0.7      Absolute Eosinophils 0.0      Absolute Basophils 0.1      Absolute Immature Granulocytes 0.2      Absolute NRBCs 0.0     TSH WITH FREE T4 REFLEX - Abnormal    TSH 20.40 (*)    T4 FREE - Abnormal    Free T4 0.82 (*)    TROPONIN T, HIGH SENSITIVITY - Abnormal    Troponin T, High Sensitivity 60 (*)    INFLUENZA A/B & SARS-COV2 PCR MULTIPLEX - Normal    Influenza A PCR Negative      Influenza B PCR Negative      RSV PCR Negative      SARS CoV2 PCR Negative     ROUTINE UA WITH MICROSCOPIC REFLEX TO CULTURE   LACTIC ACID WHOLE BLOOD     XR Chest Port 1 View   Final Result   Impression:    1. Cardiomegaly with perihilar and bibasilar mixed interstitial and   airspace opacities, likely secondary to cardiogenic pulmonary edema.   2. Small bilateral pleural effusions.      I have personally reviewed the examination and initial interpretation   and I agree with the findings.      OLLIE DAVALOS MD            SYSTEM ID:  [id number]      Echocardiogram Complete    (Results Pending)           Medical Decision Making  The patient's presentation is strongly suggestive of a chronic illness severe exacerbation, progression, or side effect of treatment.    The patient's evaluation involved:  an assessment requiring an independent historian (see separate area of note for details)  review of external note(s) from 3+ sources (previous clinic notes, hospital discharge and ED notes.)  ordering and/or review of 3+ test(s) in this encounter (BNP, troponin, ECG, chest x-rays, CBC, CMP.)  review of 3+ test result(s) ordered prior to this encounter (BNP, troponin, ECG, chest x-rays, CBC, CMP, INR.)  independent interpretation of testing performed by another health professional (chest x-ray.)    The patient's management involved a decision regarding hospitalization.      Assessment & Plan    47 year old female w/ PMH notable for obesity, asthma, DM2, HTN, persistent atrial fibrillation - on Eliquis, chronic HFrEF, NICM (EF 20% by echo 9/2022), acquired hypothyroid, JYOTI, CKD.    Clinically, patient appears comfortable on supplemental oxygen. Lungs with wheezing on auscultation, lower extremity with pitting edema, though 2+ DP and PT pulses. Right great toe darker in color than rest of right foot. Vital signs notable for hypertension, and irregular a-fib with mild tachycardia.     Ddx includes, but not limited to pneumonia, ACS, asthma exacerbation, COVID infection, decompensated heart failure.    On arrival patient EKG completed which showed A. fib ischemic changes.  She has been placed and laboratory studies ordered.  CBC showed a stable hemoglobin, leukocytosis.  Abdominal panel showed a stable creatinine was 5.  Her magnesium was slightly low at 1.6.  Her BNP was elevated at 1500.  This is increased from 3 weeks ago at hospital discharge was 2000.  TSH was 20, but T4 was 0.8.  Her influenza and COVID swabs are negative.  Her initial troponin was 75, the repeat was 60.  The final radiology read of her chest x-ray  was read as cardiomegaly with perihilar and bibasilar mixed interstitial and airspace opacities, likely secondary to cardiogenic pulmonary edema.    With her lack of leukocytosis, and elevated BNP and chest x-ray unlikely to be infectious cause to her shortness of breath.  It appears she is not in acute exacerbation of her heart failure.  Due to that she was initially given a 5 mg dose of IV Bumex.  With her non-ischemic and non-acute EKG, and stable troponins ACS at this time is unlikely.  Suspect elevated troponin is due to demand.  Also she was given 1 nebulizer treatment Xopenex helped with her breathing.  Her O2 has been stable on 2 L supplemental oxygen.    With her elevated BNP, and SOB patient appears to need to be admitted to the hospital. I spoke with the cardiology staff and reviewed patient and presentation, current state of workup any pending studies. They will admit for further evaluation/management. It was requested to obtain patient's lactic acid, which was ordered and is in process.    I have reviewed the available findings, plan and need for admission with patient. She will be admitted to the cardiology 2 service.    I have reviewed the nursing notes. I have reviewed the findings, diagnosis, plan and need for follow up with the patient.    New Prescriptions    No medications on file       Final diagnoses:   Acute HFrEF (heart failure with reduced ejection fraction) (H)   Shortness of breath   Elevated troponin   Chronic atrial fibrillation (H)       YOCASTA Rizo CNP  [facility] EMERGENCY DEPARTMENT  1/23/2023     Bonifacio Kebede APRN CNP  01/23/23 1921

## 2023-01-23 NOTE — TELEPHONE ENCOUNTER
Called patient and unable to leave voicemail due to mailbox full. Patient has an appointment on 1/24/2023. Need to collect the last 14 days worth of blood sugar readings to prepare for patient's visit.     Taylor Myhre, RMA

## 2023-01-23 NOTE — TELEPHONE ENCOUNTER
"Pt called, she couldn't get hold of her primary provider, she called the ED and the call was sent to triage from scheduling    Reason for Disposition    Dizziness, lightheadedness, or weakness and heart beating very rapidly (e.g., > 140 / minute)    Answer Assessment - Initial Assessment Questions  1. DESCRIPTION: \"Please describe your heart rate or heartbeat that you are having\" (e.g., fast/slow, regular/irregular, skipped or extra beats, \"palpitations\")      Feels like afib  2. ONSET: \"When did it start?\" (Minutes, hours or days)       A couple of days now  3. DURATION: \"How long does it last\" (e.g., seconds, minutes, hours)      ongoing  4. PATTERN \"Does it come and go, or has it been constant since it started?\"  \"Does it get worse with exertion?\"   \"Are you feeling it now?\"      Feels worse  5. TAP: \"Using your hand, can you tap out what you are feeling on a chair or table in front of you, so that I can hear?\" (Note: not all patients can do this)          6. HEART RATE: \"Can you tell me your heart rate?\" \"How many beats in 15 seconds?\"  (Note: not all patients can do this)        Not sure  7. RECURRENT SYMPTOM: \"Have you ever had this before?\" If Yes, ask: \"When was the last time?\" and \"What happened that time?\"       Yes, was in the hospital and discharged on 1/1/23  8. CAUSE: \"What do you think is causing the palpitations?\"      Not sure  9. CARDIAC HISTORY: \"Do you have any history of heart disease?\" (e.g., heart attack, angina, bypass surgery, angioplasty, arrhythmia)       yes  10. OTHER SYMPTOMS: \"Do you have any other symptoms?\" (e.g., dizziness, chest pain, sweating, difficulty breathing)        Trouble catching breath, dizziness  11. PREGNANCY: \"Is there any chance you are pregnant?\" \"When was your last menstrual period?\"        no    Protocols used: HEART RATE AND HEARTBEAT [id number]  Sasha Nair RN   [facility]          "

## 2023-01-23 NOTE — ED NOTES
BIBA, for SOB. Ran  Out of her nebulizer  . Per EMS sats in the 70's, duo neb given and oxygen applied at History of CHF and Asthma. Was recently discharge 1/1 23 .

## 2023-01-23 NOTE — ED TRIAGE NOTES
BIBA, for SOB.  Was out her inhaler. Per EMS sats in the 70's, duo neb given and oxygen applied at History of CHF and Asthma. Was recently discharge 1/1 23 .             Triage Assessment     Row Name 01/23/23 145       Triage Assessment (Adult)    Airway WDL X;WDL       Respiratory WDL    Respiratory WDL X       Skin Circulation/Temperature WDL    Skin Circulation/Temperature WDL WDL       Cardiac WDL    Cardiac WDL WDL       Peripheral/Neurovascular WDL    Peripheral Neurovascular WDL WDL       Cognitive/Neuro/Behavioral WDL    Cognitive/Neuro/Behavioral WDL WDL

## 2023-01-24 NOTE — CONSULTS
[facility] Nurse Inpatient Assessment     Consulted for:Wound(s): right foot       Patient History (according to provider note(s):      Pricilla Brown is a 47 year old female with PMHx of HFrEF (EF 20-25%, 12/2022) 2/2 NICM (diagnosed 2012), long-standing persistent AF (on Apxiban), HTN, insulin-dependent type II DM, morbid obesity, asthma, JYOTI and Graves disease s/p radioiodine ablation c/b hypothyroid, who was admitted on 1/23/2023 for ADHF.     Patient was admitted last month (12/25 to 1/1/2023) for ADHF, but after discharge, she started to feel short of breath at rest. She used to be able to get up and move around the house slowly, but now unable to move at all. She also mentioned chest tightness on exertion that only lasts for about 1-2 minutes. Denies fever, chest pain at rest, cough, PND, orthopnea, palpitations, syncope or near-syncope, hemoptysis, nausea, vomiting, abdominal pain, melena, hematochezia, constipation, diarrhea, dysuria, hematuria, headaches, local weakness, numbness/tingling of hands/feet.    Areas Assessed:      Areas visualized during today's visit: Focused: and Lower extremities     Wound location: Right Toes      Right lower leg 1/24/23    Right toes (top)      Right Toes (bottom)      Right toes lateral view 1/24/23    Last photo: 1/24/23  Wound due to: Unknown Etiology wound consistent with poor vasculature or frostbite.  Wound history/plan of care: new on this admission. Dry gangrene. Patient is unaware of how long her feet have looked like this and she reports having difficulty fully visualizing her feet. Patient also has neuropathy in the feet.   Wound base: 100 % eschar,     Palpation of the wound bed: firm      Drainage: none     Description of drainage: none     Measurements (length x width x depth, in cm): Unable to measure eschar located on great , 3th, and 4th toes   Periwound skin: Intact      Color: dusky      Temperature:  normal   Odor: none  Pain: denies , none  Pain interventions prior to dressing change: N/A  Treatment goal: Keep dry eschar stable to prevent wet gangrene and Protection  STATUS: initial assessment  Supplies ordered: gathered and at bedside    Treatment Plan:     Right toes:  Daily  Paint toes with betadine. Be sure to apply over dry eschar. Ok to leave open to air. No soaking feet.    Orders: Written    RECOMMEND PRIMARY TEAM ORDER: Lymphedema consult and Vascular consult page sent.  Education provided: Not appropriate today Patient falling asleep.  Discussed plan of care with: Patient  WOC nurse follow-up plan: weekly  Notify WOC if wound(s) deteriorate.  Nursing to notify the Provider(s) and re-consult the WOC Nurse if new skin concern.    DATA:     Current support surface: Standard  Standard gel/foam mattress (IsoFlex, Atmos air, etc)  Containment of urine/stool: Indwelling catheter  BMI: Body mass index is 86.93 kg/m .   Active diet order: Orders Placed This Encounter      2 Gram Sodium Diet     Output: I/O last 3 completed shifts:  In: 350 [P.O.:350]  Out: 2175 [Urine:2175]     Labs: Recent Labs   Lab 01/24/23  0650   ALBUMIN 2.7*   HGB 15.0   INR 1.57*   WBC 8.5     Pressure injury risk assessment:   Sensory Perception: 3-->slightly limited  Moisture: 3-->occasionally moist  Activity: 1-->bedfast  Mobility: 3-->slightly limited  Nutrition: 4-->excellent  Friction and Shear: 2-->potential problem  Shane Score: 16    Iesha Mendenhall RN CWN  Dept. Pager: [phone]  Dept. Office Number: [phone]

## 2023-01-24 NOTE — PROGRESS NOTES
"SPIRITUAL HEALTH SERVICES Progress Note  [facility] 6B    Met with patient Pricilla per admission request. Pricilla states that she is feeling down and has gotten \"bad news after bad news today\". She was tearful during our visit and asked for prayers for her health. She also asked for any kind of devotional book we had, I suggested a copy of the New Testament/Psalms and she was agreeable to that. I brought her a copy this afternoon.     [facility] remains available per patient request for support. I will continue to follow while patient is on Unit 6B.    Latonia Kan MDiv.  Chaplain Resident  Pager [phone]      * [facility] remains available 24/7 for emergent requests/referrals, either by having the switchboard page the on-call  or by entering an ASAP/STAT consult in Epic (this will also page the on-call ). Routine Epic consults receive an initial response within 24 hours.*    "

## 2023-01-24 NOTE — PHARMACY-ADMISSION MEDICATION HISTORY
Admission Medication History Completed by Pharmacy    See [facility] Admission Navigator for allergy information, preferred outpatient pharmacy, prior to admission medications and immunization status.     Medication History Sources:     Patient    Fill history    Changes made to PTA medication list (reason):    Added: docusate, oxycodone, nicotine nasal spray    Deleted:   o Oxycodone-APAP (patient takes oxycodone without acetaminophen)  o Senna (patient prefers docusate to senna when needed)  o Nicotine patch (patient reported only using the patch last time they were admitted to [place] and using the nasal spray at home)  o Bisacodyl suppositories (patient reports no longer using)  o Ciclopirox 8% solution (patient uses the cream)  o Colchicine (patient does not take scheduled colchicine only as needed - there is already an entry for as needed)  o Duloxetine (patient reports not taking - no recent fill history)  o Ketorolac ophthalmic solution (patient no longer uses)    Changed: None    Additional Information:    Patient currently uses insulin glargine U-300 and was unsure of the dose taking (patient did think maybe 40 units and that what was entered on the list already so that was left) and insulin aspart as a correction factor.      Prior to Admission medications    Medication Sig Last Dose Taking? Auth Provider Long Term End Date   acetaminophen (TYLENOL) 325 MG tablet Take 2 tablets (650 mg) by mouth 3 times daily as needed for mild pain or fever (total acetaminophen dose should not exceed 3000 mg per day) Past Week Yes Jamilah Bernal MD     albuterol (PROVENTIL) (2.5 MG/3ML) 0.083% neb solution Take 1 vial (2.5 mg) by nebulization every 6 hours as needed for shortness of breath / dyspnea or wheezing Past Week Yes Yuliet Renee, APRN CNP Yes    albuterol (VENTOLIN HFA) 108 (90 Base) MCG/ACT inhaler Inhale 2 puffs into the lungs every 6 hours as needed for shortness of breath / dyspnea 1/23/2023 Yes Armando  DO Lenore Yes    apixaban ANTICOAGULANT (ELIQUIS) 5 MG tablet Take 1 tablet (5 mg) by mouth 2 times daily 1/23/2023 Yes Percy Alcala MD     atorvastatin (LIPITOR) 40 MG tablet Take 1 tablet (40 mg) by mouth daily 1/23/2023 at 0800 Yes Yuliet Renee APRN CNP Yes    bumetanide (BUMEX) 1 MG tablet Take 5 tablets (5 mg) by mouth 2 times daily 1/22/2023 Yes Madeline Vasquez MD Yes    capsaicin (ZOSTRIX) 0.025 % external cream Apply 1 g topically 3 times daily as needed (use for neuropathy pain) 1/23/2023 Yes Isela Archibald PA-C     ciclopirox (LOPROX) 0.77 % cream Apply topically 2 times daily To feet and toenails. 1/22/2023 Yes Madeline Vasquez MD     clotrimazole (LOTRIMIN) 1 % external cream Apply topically 2 times daily as needed (skin irritation) Past Week Yes Jamilah Bernal MD     colchicine (COLCYRS) 0.6 MG tablet Take 1-2 tablets by mouth daily as needed More than a month Yes Unknown, Entered By History     diclofenac (VOLTAREN) 1 % topical gel Apply 4 grams to knees or 2 grams to hands four times daily using enclosed dosing card. Past Month Yes Griselda Jean Baptiste APRN CNP     docusate sodium (COLACE) 100 MG tablet Take 100 mg by mouth daily as needed for constipation More than a month Yes Unknown, Entered By History     Efinaconazole 10 % SOLN Externally apply topically daily To toenails. Past Month Yes Norman Jean, GI     gabapentin (NEURONTIN) 300 MG capsule Take 2 capsules (600 mg) by mouth 3 times daily 1/23/2023 at 0800 Yes Karen Velasquez PA-C Yes    hydrALAZINE (APRESOLINE) 25 MG tablet Take 0.5 tablets (12.5 mg) by mouth 3 times daily 1/23/2023 Yes Madeline Vasquez MD Yes    hydrochlorothiazide (HYDRODIURIL) 12.5 MG tablet Take 1 tablet (12.5 mg) by mouth 2 times daily 1/23/2023 Yes Percy Alcala MD Yes    insulin aspart (NOVOLOG FLEXPEN) 100 UNIT/ML pen INJECT 10 UNITS UNDER THE SKIN WITH MEALS, PLUS CORRECTION. Additional correction scale insulin as  follows based on pre-meal glucose: 140 - 160 = + 1 unit;  161 - 180 = + 2 units, 181 - 200 = + 3 units, 201-220 = +4 units, 221-240 = +5 units, 241-260 = +6 units, 261-280 = +7 units, 281-300 = +8 units 1/23/2023 Yes Madeline Vasquez MD Yes    insulin glargine U-300 (TOUJEO MAX SOLOSTAR) 300 UNIT/ML (2 units dial) pen Inject subcu Toujeo 40 units at bedtime 1/22/2023 Yes Madeline Vasquez MD Yes    insulin pen needle (BD RIVER U/F) 32G X 4 MM miscellaneous Use 6 daily or as directed 1/23/2023 Yes Isela Archibald PA-C No    isosorbide dinitrate (ISORDIL) 20 MG tablet Take 1 tablet (20 mg) by mouth 3 times daily 1/23/2023 Yes Yuliet Renee, APRN CNP Yes    levothyroxine (SYNTHROID/LEVOTHROID) 200 MCG tablet Take 1 tablet (200mcg) daily Monday-Saturday and take 2 tablets (400mcg) on Sunday 1/23/2023 Yes Unknown, Entered By History No    metoprolol succinate ER (TOPROL XL) 25 MG 24 hr tablet Take 3 tablets (75 mg) by mouth daily 1/23/2023 Yes Madeline Vasquez MD Yes    nicotine (NICOTROL) 10 MG/ML SOLN inhalation solution Spray 1 spray in nostril every hour as needed for smoking cessation Past Week Yes Unknown, Entered By History     nystatin (MYCOSTATIN) 427500 UNIT/GM external cream Apply topically 2 times daily To toenails 1/22/2023 Yes Norman Jean DPM     oxyCODONE (ROXICODONE) 5 MG tablet Take 5 mg by mouth 5 times daily 1/23/2023 Yes Unknown, Entered By History     polyethylene glycol (MIRALAX/GLYCOLAX) powder Take 17 g by mouth daily as needed for constipation More than a month Yes Reported, Patient     prednisoLONE acetate (PRED FORTE) 1 % ophthalmic suspension As directed 1 Drop 3 times daily. Follow physician instructions for the operative eye. Past Week Yes Sun Degroot MD     Semaglutide, 1 MG/DOSE, 4 MG/3ML SOPN Inject 1 mg Subcutaneous once a week 1/23/2023 Yes Isela Archibald PA-C     spironolactone (ALDACTONE) 50 MG tablet Take 1 tablet (50 mg) by mouth daily Past Week Yes Pedro  MD Madeline Yes    tiotropium (SPIRIVA HANDIHALER) 18 MCG inhalation capsule Inhale contents of one capsule daily. 1/23/2023 Yes Roc Plasencia MD Yes    topiramate (TOPAMAX) 25 MG tablet 25 mg at bedtime for 1 week, 50 mg at bedtime for 1 week and 75 mg daily at bedtime thereafter Past Week Yes Silva Damon MD Yes    ACCU-CHEK SANGEETA PLUS test strip USE TO TEST BLOOD SUGAR FOUR TIMES A DAY  OR AS DIRECTED.   Isela Archibald PA-C     blood glucose (NO BRAND SPECIFIED) lancets standard USE TO CHECK  blood sugar fasting each am  prelunch and predinner daily OR AS DIRECTED Call clinic to schedule MD APPT.   Isela Archibald PA-C     blood glucose (NO BRAND SPECIFIED) test strip Use to test blood sugar 4 times daily or as directed. Accu check Sangeeta plus   Lulu Lr MD     blood glucose (NO BRAND SPECIFIED) test strip Use to test blood sugar 4 times daily or as directed.   Vladislav Wright MD     blood glucose (NO BRAND SPECIFIED) test strip Use to test blood sugars 3 times daily or as directed   Isela Archibald PA-C     blood glucose monitoring (ACCU-CHEK FASTCLIX) lancets Use to test blood sugar 4 times daily or as directed.or lancets that go with meter being used   Isela Archibald PA-C     blood glucose monitoring (IBG STAR) meter device kit -PLEASE GIVE PATIENT A DEVICE HER INSURANCE WILL COVER-   Isela Archibald PA-C     blood glucose monitoring (NO BRAND SPECIFIED) meter device kit Use to test blood sugar 4  times daily or as directed.   Isela Archibald PA-C     blood glucose monitoring (NO BRAND SPECIFIED) meter device kit Use to test blood sugar 3 times daily or as directed.   Isela Archibald PA-C     Blood Pressure KIT Use to check blood pressure as directed, once daily and as needed. Record results.   Karen Velasquez PA-C     Continuous Blood Gluc Sensor (FREESTYLE BELLO 14 DAY SENSOR) MISC Change every 14 days.   Pedro  MD Madeline Yes    Continuous Blood Gluc Sensor (FREESTYLE BELLO 14 DAY SENSOR) MISC 1 each every 14 days   Madeline Vasquez MD Yes    Continuous Blood Gluc Sensor (FREESTYLE BELLO 2 SENSOR) MISC 1 Units every 14 days Check BG 4 times daily before meals and at bedtime.   Madeline Vasquez MD Yes    glucagon 1 MG SOLR injection Inject 1 mg Subcutaneous every 15 minutes as needed for low blood sugar   Madeline Vasquez MD Yes    order for DME Left foot   Norman Jean DPM     order for DME Equipment being ordered:  6164-5260 $92   Plantar, fasciitis, LG, night splint   Norman Jean DPM     order for DME Equipment being ordered: Challenger Wide walker if available - patient needs seat, basket and brakes.   Ximena Carpenter NP     ORDER FOR DME Use your CPAP device as directed by your provider. Pressure change to min 13 max 18cwp   Martina Jesus MD     Respiratory Therapy Supplies (NEBULIZER COMPRESSOR) KIT 1 Device 4 times daily as needed.   Roc Plasencia MD         Date completed: 01/24/23    Medication history completed by: Tank Alvarado [facility]

## 2023-01-24 NOTE — PROGRESS NOTES
"[facility] ADMISSION NOTE    Patient admitted to room 6238-02 at approximately 2200 via cart from emergency room. Patient was accompanied by nurse.     Verbal SBAR report received from Yesenia CHAPPELL prior to patient arrival.     Patient trasferred to bed via air robert. Patient alert and oriented X 3. Pain is controlled with current analgesics.  Medication(s) being used: Percocet.  . Admission vital signs: Blood pressure 133/84, pulse (!) 129, temperature 97.4  F (36.3  C), temperature source Oral, resp. rate 19, height 1.702 m (5' 7\"), weight (!) 294.8 kg (650 lb), SpO2 100 %, not currently breastfeeding. Patient was oriented to plan of care, call light, bed controls, tv, telephone, bathroom and visiting hours.     Risk Assessment    The following safety risks were identified during admission: fall and skin. Yellow risk band applied: YES.     Skin Initial Assessment    This writer admitted this patient and completed a full skin assessment and Shane score in the Adult PCS flowsheet. Appropriate interventions initiated as needed.     Secondary skin check completed by Marium CHAPPELL.    Shane Risk Assessment  Sensory Perception: 3-->slightly limited  Moisture: 3-->occasionally moist  Activity: 1-->bedfast  Mobility: 2-->very limited  Nutrition: 4-->excellent  Friction and Shear: 2-->potential problem  Shane Score: 15  Friction/Shear Interventions: HOB 30 degrees or less  Mattress: Bariatric foam    Education    Patient has a [place] to Observation order: No  Observation education completed and documented: Yumiko Juarez RN    "

## 2023-01-24 NOTE — PROGRESS NOTES
"    Neuro: A&Ox4.     Hx: EF 20-25%, A-Fib on Eliquis, Dm2, morbid obesity, Graves disease. Recurrent admission d/t HF.     Cardiac: A-Fib with RVR BP (!) 115/91 (BP Location: Right arm, Patient Position: Semi-Sheffield's, Cuff Size: Adult Regular)   Pulse 113   Temp 97.8  F (36.6  C) (Oral)   Resp 10   Ht 1.702 m (5' 7\")   Wt (!) 294.8 kg (650 lb)   SpO2 100%   .80 kg/m  .      Respiratory: She was on NC at 5L, she was switched to Bipap over night at 30%.     GI/: She was given Bumex in the ED. She has a phan with adequate urine output. AC checks at AC & HS. No Bm this shift. Last Bm was 1/21/23 per patient.     Diet/appetite: 2 gm sodium diet with 2000 FR. Eating well.    Activity:  Assist of 2 with ceiling lift.      Pain: She c/o generalized pain and was given PRN Percocet 1 tab x1. She fell asleep during reassessment of pain.      Skin: She has dry skin. She has a right necrotic toe with a small laceration.    LDA's:Phan and PIV intact     Plan: Manage pain, fluid overload and monitor respiratory status. Monitor K & Mg.   "

## 2023-01-24 NOTE — PROGRESS NOTES
CLINICAL NUTRITION SERVICES  Reason for Assessment:  Nutrition education regarding low sodium/HF diet education (referral per order-set)  Diet History:  - Pt sleeping soundly and unarousable to voice/gentle nudge. Handouts left at bedside and will follow for appropriate time to provide verbal education during clinical course.  - Per chart review pt with a hx of T2DM, HF with EF 20%, hypothyroidism, CKD, and stage III obesity. Has been assessed by outpatient bariatric RD as pt is noted interested in having a sleeve gastrectomy in future (BMI currently 86 kg/m^2).   - Unclear if pt has had a low Na diet education before. Low Na education last attempted while inpatient by 6C RD 12/27/22, during which time pt was also sleeping soundly.   Nutrition Diagnosis:  Unable to assess   Interventions:  - Provided handouts from the [facility] for Heart Failure Nutrition Therapy (recs for low Na diet, managing fluid restriction, daily weights) + handout with suggestions for sodium-free flavoring tips. Additional handouts from '[employer]' for Low Sodium Foods & Beverages (left at bedside).   - Low sodium hospital menu guide also left at bedside with note on top of page listing sodium goal to stay below 2000 mg/day.  Goals:   Patient will verbalize understanding of 3 low sodium foods  Return demonstration - Patient will be able to teach back recommended Na restriction (2000 mg/day)  Follow-up:    Patient to ask any further nutrition-related questions before discharge.  In addition, pt may request outpatient RD appointment.    Esteban Bentley RDN, LD, CNSC  6B RD pager: [phone]   6B work-room RD phone: *[phone]    Weekend/Holiday RD pager [phone]

## 2023-01-24 NOTE — CONSULTS
Care Management Initial Consult    General Information  Assessment completed with: VM-chart review,    Type of CM/SW Visit: Initial Assessment    Primary Care Provider verified and updated as needed: Yes   Readmission within the last 30 days: previous discharge plan unsuccessful   Return Category: Exacerbation of disease  Reason for Consult: discharge planning  Advance Care Planning: Advance Care Planning Reviewed: no concerns identified          Communication Assessment  Patient's communication style: spoken language (English or Bilingual)    Hearing Difficulty or Deaf: no   Wear Glasses or Blind: no    Cognitive  Cognitive/Neuro/Behavioral: WDL                      Living Environment:   People in home: alone     Current living Arrangements: apartment      Able to return to prior arrangements: yes       Family/Social Support:  Care provided by: self, other (see comments) (PCA)  Provides care for: no one, unable/limited ability to care for self  Marital Status: Single  Sibling(s), PCA          Description of Support System: Supportive    Support Assessment: Adequate family and caregiver support    Current Resources:   Patient receiving home care services: No     Community Resources: PCA  Equipment currently used at home: cane, straight, walker, rolling  Supplies currently used at home: Other (CPAP)    Employment/Financial: unable to assess  Employment Status:          Financial Concerns:             Lifestyle & Psychosocial Needs: (pulled from chart)  Social Determinants of Health     Tobacco Use: High Risk     Smoking Tobacco Use: Light Smoker     Smokeless Tobacco Use: Never     Passive Exposure: Not on file   Alcohol Use: Not on file   Financial Resource Strain: Not on file   Food Insecurity: Not on file   Transportation Needs: Not on file   Physical Activity: Not on file   Stress: Not on file   Social Connections: Not on file   Intimate Partner Violence: Not on file   Depression: Not at risk     PHQ-2 Score: 0    Housing Stability: Not on file       Functional Status:  Prior to admission patient needed assistance:   Dependent ADLs:: Bathing, Dressing, Grooming, Ambulation-walker, Ambulation-cane  Dependent IADLs:: Cooking, Laundry, Shopping, Meal Preparation, Transportation, Cleaning            Additional Information:  Pt is a 47 year old female with PMHx of HFrEF (EF 20-25%) 2/2 NICM, long-standing persistent AF on Apixiban, HTN, insulin-dependent type II DM, morbid obesity, asthma, JYOTI and Graves disease s/p radioiodine ablation c/b hypothyroid, who was admitted on 1/23/2023 for ADHF.    RNCC completed CM assessment due to elevated risk score and need for discharge planning. RNCC reached out to pt over phone and introduced RNCC role. RNCC was just getting started with assessment when RNCC was notified by pt that another clinician walked in and she needed to work with them. Pt asked for RNCC to call back in an hour. RNCC completed CM assessment via chart review instead due to pt being a readmission and just discharged on 1/1/23.    Pt lives alone in an apartment. She was independent with ambulation with assist from walker or cane before recent hospitalization. Pt has a CADI CM with [facility] that has set her up with 10hr/day PCA, 4hr/day nightly supervision, and 4hr/week homemaking services. Pt has assistance from PCA for ADLs and IADLs. Pt is not currently open to any home care services but would prefer [facility] if she needs home care at discharge. Pt has support from PCA and siblings. PCA will be able to provide discharge transportation when needed. Pt currently need assist of 2 with ceiling lift for transfers. Awaiting therapy recommendations. RNCC to follow up with pt once recommendations have been determined.     PCA-Hira [place]ujica  Phone: [phone]     [facility]  Phone: [phone]    Max Crawford RN BSN  7C RN Care Coordinator   Ph: [phone]  Pager: [phone]

## 2023-01-24 NOTE — PROCEDURES
[facility]    Triple Lumen PICC Placement    Date/Time: 1/24/2023 5:08 PM  Performed by: Carlo Hansen RN  Authorized by: Dorene Grier MD   Indications: vascular access      UNIVERSAL PROTOCOL   Site Marked: Yes  Prior Images Obtained and Reviewed:  Yes  Required items: Required blood products, implants, devices and special equipment available    Patient identity confirmed:  Verbally with patient and arm band  NA - No sedation, light sedation, or local anesthesia  Confirmation Checklist:  Patient's identity using two indicators  Time out: Immediately prior to the procedure a time out was called    Universal Protocol: the Joint Commission Universal Protocol was followed    Preparation: Patient was prepped and draped in usual sterile fashion       ANESTHESIA    Anesthesia: Local infiltration  Local Anesthetic:  Lidocaine 1% without epinephrine  Anesthetic Total (mL):  5      SEDATION    Patient Sedated: No        Preparation: skin prepped with ChloraPrep  Skin prep agent: skin prep agent completely dried prior to procedure  Sterile barriers: maximum sterile barriers were used: cap, mask, sterile gown, sterile gloves, and large sterile sheet  Hand hygiene: hand hygiene performed prior to central venous catheter insertion  Type of line used: PICC  Catheter type: triple lumen  Lumen type: power PICC and non-valved  Lumen Identification: Gray, Red and White  Catheter size: 5 Fr  Brand: Bard  Lot number: [id number]  Placement method: venipuncture, MST and ultrasound  Number of attempts: 1  Difficulty threading catheter: no  Successful placement: yes  Orientation: right    Location: cephalic vein  Tip Location: SVC  Arm circumference: adults 10 cm  Extremity circumference: 38  Visible catheter length: 2  Total catheter length: 48  Dressing and securement: adhesive securement device, alcohol impregnated caps, blood cleaned with CHG, chlorhexidine disc  applied, dressing applied, glue, line secured, securement device, site cleansed, sterile dressing applied, subcutaneous anchor securement system, transparent securement dressing and transparent dressing  Post procedure assessment: blood return through all ports, free fluid flow and placement verified by x-ray  PROCEDURE Describe Procedure: PICC ok to use  Disposal: sharps and needle count correct at the end of procedure, needles and guidewire disposed in sharps container

## 2023-01-24 NOTE — H&P
[facility]   Cardiology II Service / Advanced Heart Failure  History & Physical    Pricilla Brown  : 1975  MRN # [id number]    ADMIT DATE: 2023    PCP: Mario Lockhart MD    Primary cardiologist: Dr. Alcala    CHIEF COMPLAINT: Shortness of breath    HPI:   Pricilla Brown is a 47 year old female with PMHx of HFrEF (EF 20-25%, 2022) 2/2 NICM (diagnosed ), long-standing persistent AF (on Apxiban), HTN, insulin-dependent type II DM, morbid obesity, asthma, JYOTI and Graves disease s/p radioiodine ablation c/b hypothyroid, who was admitted on 2023 for ADHF.    Patient was admitted last month ( to 2023) for ADHF, but after discharge, she started to feel short of breath at rest. She used to be able to get up and move around the house slowly, but now unable to move at all. She also mentioned chest tightness on exertion that only lasts for about 1-2 minutes. Denies fever, chest pain at rest, cough, PND, orthopnea, palpitations, syncope or near-syncope, hemoptysis, nausea, vomiting, abdominal pain, melena, hematochezia, constipation, diarrhea, dysuria, hematuria, headaches, local weakness, numbness/tingling of hands/feet.    In the ED, hemodynamically stable with BP at 125/105, . Received IV bumex 5 mg at 5PM. Labs as shown below.    ROS:   12-pt ROS otherwise negative except as noted above    PMH:  Past Medical History:   Diagnosis Date     A-fib (H)     on coumadin since      Asthma     as a kid     Chest pain 2017     Chronic anticoagulation for a-fib 2/15/2013    INR's followed by coumadin clinic at      Diabetes mellitus (H)      Diastolic heart failure 2/15/2013     Dilated cardiomyopathy (H) 2013     HTN (hypertension)      Hyperthyroidism     Graves, s/p I131 , now on prednisone and methimazole     Mixed type age-related cataract, both eyes 2022     Morbid obesity (H)      Pulmonary embolism (H)     hospitalized in [place], on  lovenox/coumadin for a few months but stopped, hypercoag w/u neg per pt     Sleep apnea     using CPAP       PSH:  History reviewed. No pertinent surgical history.    MEDICATIONS:  Prior to Admission Medications   Prescriptions Last Dose Informant Patient Reported? Taking?   ACCU-CHEK RON PLUS test strip  Self No No   Sig: USE TO TEST BLOOD SUGAR FOUR TIMES A DAY  OR AS DIRECTED.   Blood Pressure KIT  Self No No   Sig: Use to check blood pressure as directed, once daily and as needed. Record results.   Continuous Blood Gluc Sensor (FREESTYLE BELLO 14 DAY SENSOR) MIS   No No   Sig: Change every 14 days.   Continuous Blood Gluc Sensor (FREESTYLE BELLO 14 DAY SENSOR) MIS   No No   Si each every 14 days   Continuous Blood Gluc Sensor (FREESTYLE BELLO 2 SENSOR) [facility]   No No   Si Units every 14 days Check BG 4 times daily before meals and at bedtime.   DULoxetine (CYMBALTA) 20 MG capsule Unknown Self No Yes   Sig: Take 1 capsule (20 mg) by mouth 2 times daily   Efinaconazole 10 % SOLN Past Month Self No Yes   Sig: Externally apply topically daily To toenails.   ORDER FOR DME  Self No No   Sig: Use your CPAP device as directed by your provider. Pressure change to min 13 max 18cwp   Respiratory Therapy Supplies (NEBULIZER COMPRESSOR) KIT  Self No No   Si Device 4 times daily as needed.   Semaglutide, 1 MG/DOSE, 4 MG/3ML SOPN  Self No No   Sig: Inject 1 mg Subcutaneous once a week   acetaminophen (TYLENOL) 325 MG tablet Past Week Self No Yes   Sig: Take 2 tablets (650 mg) by mouth 3 times daily as needed for mild pain or fever (total acetaminophen dose should not exceed 3000 mg per day)   albuterol (PROVENTIL) (2.5 MG/3ML) 0.083% neb solution 2023 Self No Yes   Sig: Take 1 vial (2.5 mg) by nebulization every 6 hours as needed for shortness of breath / dyspnea or wheezing   albuterol (VENTOLIN HFA) 108 (90 Base) MCG/ACT inhaler 2023 Self No Yes   Sig: Inhale 2 puffs into the lungs every 6 hours as  needed for shortness of breath / dyspnea   apixaban ANTICOAGULANT (ELIQUIS) 5 MG tablet 1/23/2023 Self No Yes   Sig: Take 1 tablet (5 mg) by mouth 2 times daily   atorvastatin (LIPITOR) 40 MG tablet 1/22/2023 Self No Yes   Sig: Take 1 tablet (40 mg) by mouth daily   bisacodyl (DULCOLAX) 10 MG suppository Unknown Self No Yes   Sig: Place 1 suppository (10 mg) rectally daily as needed for constipation   blood glucose (NO BRAND SPECIFIED) lancets standard  Self No No   Sig: USE TO CHECK  blood sugar fasting each am  prelunch and predinner daily OR AS DIRECTED Call clinic to schedule MD APPT.   blood glucose (NO BRAND SPECIFIED) test strip  Self No No   Sig: Use to test blood sugars 3 times daily or as directed   blood glucose (NO BRAND SPECIFIED) test strip  Self No No   Sig: Use to test blood sugar 4 times daily or as directed.   blood glucose (NO BRAND SPECIFIED) test strip  Self No No   Sig: Use to test blood sugar 4 times daily or as directed. Accu check Sangeeta plus   blood glucose monitoring (ACCU-CHEK FASTCLIX) lancets  Self No No   Sig: Use to test blood sugar 4 times daily or as directed.or lancets that go with meter being used   blood glucose monitoring (IBG STAR) meter device kit  Self No No   Sig: -PLEASE GIVE PATIENT A DEVICE HER INSURANCE WILL COVER-   blood glucose monitoring (NO BRAND SPECIFIED) meter device kit  Self No No   Sig: Use to test blood sugar 3 times daily or as directed.   blood glucose monitoring (NO BRAND SPECIFIED) meter device kit  Self No No   Sig: Use to test blood sugar 4  times daily or as directed.   bumetanide (BUMEX) 1 MG tablet 1/22/2023  No Yes   Sig: Take 5 tablets (5 mg) by mouth 2 times daily   capsaicin (ZOSTRIX) 0.025 % external cream Unknown Self No Yes   Sig: Apply 1 g topically 3 times daily as needed (use for neuropathy pain)   ciclopirox (LOPROX) 0.77 % cream 1/22/2023  No Yes   Sig: Apply topically 2 times daily To feet and toenails.   ciclopirox (PENLAC) 8 % external  solution 1/22/2023 Self No Yes   Sig: Apply topically daily To toenails.   clotrimazole (LOTRIMIN) 1 % external cream Past Week Self No Yes   Sig: Apply topically 2 times daily as needed (skin irritation)   colchicine (COLCYRS) 0.6 MG tablet More than a month Self No Yes   Sig: Take 1-2 tablets (0.6-1.2 mg) by mouth daily as needed for moderate pain   colchicine (COLCYRS) 0.6 MG tablet More than a month Self No Yes   Sig: Take 1 tablet (0.6 mg) by mouth 2 times daily   diclofenac (VOLTAREN) 1 % topical gel 1/22/2023 Self No Yes   Sig: Apply 4 grams to knees or 2 grams to hands four times daily using enclosed dosing card.   gabapentin (NEURONTIN) 300 MG capsule 1/23/2023 Self No Yes   Sig: Take 2 capsules (600 mg) by mouth 3 times daily   glucagon 1 MG SOLR injection Unknown  No Yes   Sig: Inject 1 mg Subcutaneous every 15 minutes as needed for low blood sugar   hydrALAZINE (APRESOLINE) 25 MG tablet 1/23/2023  No Yes   Sig: Take 0.5 tablets (12.5 mg) by mouth 3 times daily   hydrochlorothiazide (HYDRODIURIL) 12.5 MG tablet 1/23/2023 Self No Yes   Sig: Take 1 tablet (12.5 mg) by mouth 2 times daily   insulin aspart (NOVOLOG FLEXPEN) 100 UNIT/ML pen 1/23/2023  No Yes   Sig: INJECT 10 UNITS UNDER THE SKIN WITH MEALS, PLUS CORRECTION. Additional correction scale insulin as follows based on pre-meal glucose: 140 - 160 = + 1 unit;  161 - 180 = + 2 units, 181 - 200 = + 3 units, 201-220 = +4 units, 221-240 = +5 units, 241-260 = +6 units, 261-280 = +7 units, 281-300 = +8 units   insulin glargine U-300 (TOUJEO MAX SOLOSTAR) 300 UNIT/ML (2 units dial) pen 1/23/2023  No Yes   Sig: Inject subcu Toujeo 40 units at bedtime   insulin pen needle (BD RIVER U/F) 32G X 4 MM miscellaneous 1/23/2023 Self No Yes   Sig: Use 6 daily or as directed   isosorbide dinitrate (ISORDIL) 20 MG tablet 1/23/2023 Self No Yes   Sig: Take 1 tablet (20 mg) by mouth 3 times daily   ketorolac (ACULAR) 0.5 % ophthalmic solution Unknown Self No Yes   Sig:  Place 1 Drop into right eye 3 times daily.   levothyroxine (SYNTHROID/LEVOTHROID) 200 MCG tablet 2023 Self No Yes   Sig: Take one tablet (200mg) by mouth daily Monday through Friday, and take two tablets (400mcg) on  and Sundays   metoprolol succinate ER (TOPROL XL) 25 MG 24 hr tablet 2023  No Yes   Sig: Take 3 tablets (75 mg) by mouth daily   nicotine (NICODERM CQ) 14 MG/24HR 24 hr patch Unknown Self No Yes   Sig: Place 1 patch onto the skin every 24 hours   nystatin (MYCOSTATIN) 382423 UNIT/GM external cream 2023 Self No Yes   Sig: Apply topically 2 times daily To toenails   order for DME  Self No No   Sig: Equipment being ordered: Challenger Wide walker if available - patient needs seat, basket and brakes.   order for DME  Self No No   Sig: Equipment being ordered: [id number] $92   Plantar, fasciitis, LG, night splint   order for DME  Self No No   Sig: Left foot   oxyCODONE-acetaminophen (PERCOCET) 5-325 MG per tablet 2023 Self No Yes   Sig: Take 1 tablet by mouth every 8 hours as needed for moderate to severe pain (try to limit use, no further prescriptions until seen in pain clinic)   polyethylene glycol (MIRALAX/GLYCOLAX) powder Unknown Self Yes Yes   prednisoLONE acetate (PRED FORTE) 1 % ophthalmic suspension Unknown Self No Yes   Sig: As directed 1 Drop 3 times daily. Follow physician instructions for the operative eye.   senna (SENOKOT) 8.6 MG tablet Unknown Self No Yes   Sig: Take 1 tablet by mouth 2 times daily   spironolactone (ALDACTONE) 50 MG tablet Past Week  No Yes   Sig: Take 1 tablet (50 mg) by mouth daily   tiotropium (SPIRIVA HANDIHALER) 18 MCG inhalation capsule 2023 Self No Yes   Sig: Inhale contents of one capsule daily.   topiramate (TOPAMAX) 25 MG tablet Past Week Self No Yes   Si mg at bedtime for 1 week, 50 mg at bedtime for 1 week and 75 mg daily at bedtime thereafter      Facility-Administered Medications: None        ALLERGIES:     Allergies    Allergen Reactions     Penicillins Other (See Comments) and Unknown     CHILDHOOD ALLERGY  CHILDHOOD ALLERGY     Ibuprofen Hives and Rash     Ibuprofen Sodium Hives and Rash       FAMILY HISTORY:  Family History   Problem Relation Age of Onset     Thyroid Disease Mother         Grave's D     Diabetes Mother      Heart Disease Mother      Cerebrovascular Disease Mother         dec. 56 yo     Hypertension Mother      Heart Disease Sister         Heart Murmur     Diabetes Sister      Heart Disease Father         dec in his 30s, MI     Psychotic Disorder Brother         Bipolar     Diabetes Brother      Thyroid Disease Brother         Hyper Thyroid     Heart Disease Brother      Thyroid Disease Sister         Hyper Thyroid     Thyroid Disease Sister         Hyper Thyroid     Thyroid Disease Sister         Mental Health Problems     Thyroid Disease Maternal Aunt      Thyroid Disease Maternal Uncle      Cancer No family hx of      Glaucoma No family hx of      Macular Degeneration No family hx of        SOCIAL HISTORY:  Social History     Socioeconomic History     Marital status: Single     Spouse name: Not on file     Number of children: Not on file     Years of education: Not on file     Highest education level: Not on file   Occupational History     Not on file   Tobacco Use     Smoking status: Light Smoker     Packs/day: 0.25     Years: 13.00     Pack years: 3.25     Types: Cigarettes     Smokeless tobacco: Never     Tobacco comments:     down to 2 cigs a day   Substance and Sexual Activity     Alcohol use: No     Drug use: No     Sexual activity: Yes     Partners: Male     Birth control/protection: Condom   Other Topics Concern     Parent/sibling w/ CABG, MI or angioplasty before 65F 55M? Not Asked   Social History Narrative    Single, no children        Gyn:        Last pap several years ago, no abnormal    No STIs            Patient is single.  She is no longer working.  She used to work in the area of  "customer service.  She is currently living with her sister in [place], [place].  She has no pets.  Patient has smoked a half pack of cigarettes a day for the past 10 plus years.  She states that she is down to 5 cigarettes a day with the aid of Wellbutrin.  She does not smoke cigars, pipes or chew tobacco.  She has 1 cup of coffee in the morning.  She does not drink alcohol.  Patient does not exercise.      Social Determinants of Health     Financial Resource Strain: Not on file   Food Insecurity: Not on file   Transportation Needs: Not on file   Physical Activity: Not on file   Stress: Not on file   Social Connections: Not on file   Intimate Partner Violence: Not on file   Housing Stability: Not on file       PHYSICAL EXAM:  Blood pressure (!) 125/105, pulse 114, temperature 98.9  F (37.2  C), temperature source Oral, resp. rate 13, height 1.702 m (5' 7\"), weight (!) 214.1 kg (472 lb), SpO2 99 %, not currently breastfeeding.  GENERAL: NAD, on RA  HEENT: EOMI, PERRLA  NECK: Supple and without lymphadenopathy. No visible JVD  CV: S1/S2 heard without murmur, regular heart beat   RESPIRATORY: Bibasilar crackles, no wheezing  GI: Soft and non distended with normoactive bowel sounds present in all quadrants. No tenderness, rebound, guarding  EXTREMITIES: 3+ peripheral edema. Likely necrotic skin on right great and 3rd toes  NEUROLOGIC: OAx 3. CN II-XII grossly intact. No focal deficits.   MUSCULOSKELETAL: No joint swelling or tenderness.   SKIN: No jaundice. No acute rashes or lesions.     LABS:  BMP  Recent Labs   Lab 01/23/23  1537   *   POTASSIUM 5.0   CHLORIDE 99   CO2 21*   BUN 56.2*   CR 1.56*   *   JUSTIN 9.1     CBC  Recent Labs   Lab 01/23/23  1537   WBC 9.4   HGB 15.6   HCT 49.8*   MCV 95      LYMPH 17     INRNo lab results found in last 7 days.  LFTs  Recent Labs   Lab 01/23/23  1537   ALKPHOS 120*   AST 23   ALT 16   BILITOTAL 1.6*   PROTTOTAL 6.8   ALBUMIN 3.0*      INF  Recent Labs "   Lab 01/23/23  1537   CRP 40.70*       IMAGING:    Results for orders placed or performed during the hospital encounter of 01/23/23   XR Chest Port 1 View    Narrative    Exam: XR CHEST PORT 1 VIEW, 1/23/2023 4:05 PM    Indication: SOB, wheezing, hx of heart failure.    Comparison: Chest x-ray 12/25/2022    Findings:   AP portable semiupright view of the chest. Trachea is midline. Cardiac  silhouette is enlarged. Indistinct pulmonary vasculature. Perihilar  and bibasilar mixed interstitial and airspace opacities. Bilateral  costophrenic angle blunting. No pneumothorax.      Impression    Impression:   1. Cardiomegaly with perihilar and bibasilar mixed interstitial and  airspace opacities, likely secondary to cardiogenic pulmonary edema.  2. Small bilateral pleural effusions.    I have personally reviewed the examination and initial interpretation  and I agree with the findings.    OLLIE DAVALOS MD         SYSTEM ID:  [id number]     *Note: Due to a large number of results and/or encounters for the requested time period, some results have not been displayed. A complete set of results can be found in Results Review.       ASSESSMENT & PLAN:  Pricilla Brown is a 47 year old female with PMHx of HFrEF (EF 20-25%, 12/2022) 2/2 NICM (diagnosed 2012), long-standing persistent AF (on Apxiban), HTN, insulin-dependent type II DM, morbid obesity, asthma, JYOTI and Graves disease s/p radioiodine ablation c/b hypothyroid, who was admitted on 1/23/2023 for ADHF.    # Acute on chronic systolic HF  # HFrEF (EF 20-25%, 12/2022) 2/2 NICM, ACC/AHA stage D, NYHA FC IV  # Anginal symptoms, c/f possible chronic stable CAD  # Long-standing persistent AF (on Apixaban)  # HTN  Given her recent drop in EF from 40 to 20% and possible anginal symptom, might need ischemic work up again. Received bumex 5 mg IV in the ED with 1 time unmeasured urine output. BP on the high side of 120/100s, would benefit from afterload reduction  - Etiology:  NICM  - Ischemic work up: Coronary angiogram 2012- no significant CAD  - Last TTE: OSH 12/10/2022- EF 20-25%, reduced RV systolic function  - EKG: AF/atypical Aflutter, rate 120 bpm  - Volume status: currently hypervolemic, 472 lbs on admission (EDW likely 472 lbs)  - NT-proBNP: 5900  - Diuretics: PTA bumex 5 BID, and hydrochlorothiazide 12.5 BID  - Inotropes: None  - Guideline-directed therapy as below:  - ACEI/ARB/ARNI: None, given CKD   - Beta-blocker: PTA metoprolol succinate 75 mg qday   - MRA: PTA spironolactone 50 mg qday   - SGLT2i: None   - Hydralazine/Nitrate: PTA hydralazine 25 mg TID and isordil 20 mg TID  - Afterload reduction: PTA hydralazine 25 mg TID and isordil 20 mg TID  - Devices: None  - Antiplatelets: None  - Statins: None  - Anticoagulation: PTA apixaban for AF  - SCD PPX: None, not indicated previously  - Mechanical circulatory support: None    Plan:  - Repeat bumex 5 mg IV   - Increased frequency of PTA hydralazine 25 mg to q6h  - Continue PTA isordil 20 mg TID  - Reduced PTA metoprolol succinate to 25 mg qday  - Hold PTA spironolactone for K of 5  - PICC line insertion for MvO2 monitoring  - Consider repeat ischemic evaluation, given new EF drop and possible anginal symptoms  - Keenan for strict I/Os  - Daily weight  - Low Na diet & 2 L fluid restriction  - Monitor K & Mg with goal K > 4 & Mg > 2     # Lactic acidosis, improved  Lactate 2.2 on admission. Likely elevated in the setting of severe ADHF. Downtrended to 1.6 after diuresis and afterload reduction.     # Hypothyroid  # Graves disease s/p radioiodine ablation c/b hypothyroid   TSH 20.4 and free T4 0.82 on admission.   Plan:  - Endocrine consult, appreciate recs    # Right toe wound, c/f possible skin necrosis  Had wound with possible skin necrosis on right great toe and 3rd toe, but denies numbness or weakness.   Plan:  - US doppler JOSETTE of right ankle  - Can consider dermatology consult in  AM    --------------------------------------------Chronic Medical Problems-------------------------------------------    # Insulin-dependent type II DM  # Morbid obesity  A1c 13.5 on 12/25/2022. On glargine (Toujeo) 40 units at bedtime  Plan:  - Reduced PTA insulin to lantus 24 units at bedtime  - High-dose sliding scale insulin  - Hold PTA semaglutide    # Asthma  - Continue PTA inhalers (levalbuterol and spiriva)    # JYOTI  - Continue CPAP      Floor Care  Fluids: 1.5L fluid restriction  Food: 2 gram sodium diet  DVT ppx: already anticoagulated  Catheters: none  Lines: PIV  Code Status: full  Discharge plan: inpatient admission, anticipate discharge to prior living situation in 4-5 days      Patient was discussed with attending physician, MD Nina Gunderson MD  Internal Medicine Resident (PGY-2)  [facility]  Pager: [phone]

## 2023-01-24 NOTE — CONSULTS
"  Endocrinology Consult     Pricilla Brown MRN:[id number] YOB: 1975  Date of Admission:1/23/2023   Primary care provider: Mario Lockhart     Reason for visit: Shortness of breath   Reason for Endocrine consult:  \"Grave dz s/p ablation, now hypothyroid\"    HPI:  Pricilla Brown is a 47 year old female with PMHx of HFrEF (EF 20-25%), afib, HTN, insulin dependent T2DM, morbid obesity, Grave dz s/p radioiodine ablation in 2013 c/b hypothyroid, who was admitted on 1/23/2023 for ADHF. Endocrine was consulted for hypothyroidism management.    Relevant history:  Pt is following up in our diabetes and weight management clinic.  She did not come to her scheduled appointment multiple times for her diabetes, and weight management. She lives with her caretaker, who help her administering her medications and insulin.  Patient was diagnosed with Graves' disease in 2013, and then had radioablation in 2013, and since then she was taking levothyroxine for post ablative hypothyroidism.  She denies any changes in her dose recently.  She has been taking levothyroxine 200 mcg Monday to Saturday and 400 mcg on Sunday.  She stated, in general she is compliant with her medications.  However, she stated that she lost her prescription 2 weeks ago, which led to missing her levothyroxine for a week.   Currently, she endorsed shortness of breath, cold intolerance, low energy, sleepy.  Denied any nausea or vomiting or diarrhea.    She was last seen in 6/21/2022 in diabetes clinic.   During her last hospitalization 1 month ago, while she was in the hospital, she was given lantus 40 units daily, novolog meal time insulin 1:8 g CHO, and novolog 2/30 sliding scale TID AC and HS.  Her home meds at home was toujeo 225 units daily, novolog 100 units with each meal + CS, and ozempic 1 mg weekly. She could not tolerate empaglifozin due to frequent UTIs and metformin due to GI side effects.    Relevant home meds  Levothyroxine 200 mcg M-Sat- 400 " mcg on Sunday    Relevant orders  Levothyroxine 200 mcg M-Sat - 400 mcg on Sunday  Lantus 24 units at bedtime  Aspart HIRD    Relevant labs  1/23/2023 : FT4 0.82, TSH 20.4  12/25/2022 TSH 14.3, FT4 0.53  9/9/2021 TSH 6.79  8/19/2020 TSH 17.34  1/7/2013 anti TPO <10    Relevant studies  2013: NM thyroid scan: increased uptake at 24h of 42.5, concerning for hyperthyroidism    ROS:  All 12 systems were reviewed and negative except as mentioned in HPI          Past Medical/Surgical History:       Past Medical History:   Diagnosis Date     A-fib (H) 2011    on coumadin since 1/13     Asthma     as a kid     Chest pain 2/1/2017     Chronic anticoagulation for a-fib 2/15/2013    INR's followed by coumadin clinic at      Diabetes mellitus (H) 2012     Diastolic heart failure 2/15/2013     Dilated cardiomyopathy (H) 1/8/2013     HTN (hypertension)      Hyperthyroidism     Graves, s/p I131 1/13, now on prednisone and methimazole     Mixed type age-related cataract, both eyes 4/8/2022     Morbid obesity (H)      Pulmonary embolism (H) 1/12    hospitalized in [place], on lovenox/coumadin for a few months but stopped, hypercoag w/u neg per pt     Sleep apnea     using CPAP     History reviewed. No pertinent surgical history.          Allergies:     Allergies   Allergen Reactions     Penicillins Other (See Comments) and Unknown     CHILDHOOD ALLERGY  CHILDHOOD ALLERGY     Ibuprofen Hives and Rash     Ibuprofen Sodium Hives and Rash             PTA Meds:     Prior to Admission medications    Medication Sig Last Dose Taking? Auth Provider Long Term End Date   acetaminophen (TYLENOL) 325 MG tablet Take 2 tablets (650 mg) by mouth 3 times daily as needed for mild pain or fever (total acetaminophen dose should not exceed 3000 mg per day) Past Week Yes Jamilah Bernal MD     albuterol (PROVENTIL) (2.5 MG/3ML) 0.083% neb solution Take 1 vial (2.5 mg) by nebulization every 6 hours as needed for shortness of breath / dyspnea or wheezing Past  Week Yes Yuliet Renee APRN CNP Yes    albuterol (VENTOLIN HFA) 108 (90 Base) MCG/ACT inhaler Inhale 2 puffs into the lungs every 6 hours as needed for shortness of breath / dyspnea 1/23/2023 Yes Lenore Roberts,  Yes    apixaban ANTICOAGULANT (ELIQUIS) 5 MG tablet Take 1 tablet (5 mg) by mouth 2 times daily 1/23/2023 Yes Percy Alcala MD     atorvastatin (LIPITOR) 40 MG tablet Take 1 tablet (40 mg) by mouth daily 1/23/2023 at 0800 Yes Yuliet Renee APRN CNP Yes    bumetanide (BUMEX) 1 MG tablet Take 5 tablets (5 mg) by mouth 2 times daily 1/22/2023 Yes Madeline Vasquez MD Yes    capsaicin (ZOSTRIX) 0.025 % external cream Apply 1 g topically 3 times daily as needed (use for neuropathy pain) 1/23/2023 Yes Isela Archibald PA-C     ciclopirox (LOPROX) 0.77 % cream Apply topically 2 times daily To feet and toenails. 1/22/2023 Yes Madeline Vasquez MD     clotrimazole (LOTRIMIN) 1 % external cream Apply topically 2 times daily as needed (skin irritation) Past Week Yes Jamilah Bernal MD     colchicine (COLCYRS) 0.6 MG tablet Take 1-2 tablets by mouth daily as needed More than a month Yes Unknown, Entered By History     diclofenac (VOLTAREN) 1 % topical gel Apply 4 grams to knees or 2 grams to hands four times daily using enclosed dosing card. Past Month Yes Griselda Jean Baptiste APRN CNP     Efinaconazole 10 % SOLN Externally apply topically daily To toenails. Past Month Yes Norman Jean DPM     gabapentin (NEURONTIN) 300 MG capsule Take 2 capsules (600 mg) by mouth 3 times daily 1/23/2023 at 0800 Yes Karen Velasquez PA-C Yes    hydrALAZINE (APRESOLINE) 25 MG tablet Take 0.5 tablets (12.5 mg) by mouth 3 times daily 1/23/2023 Yes Madeline Vasquez MD Yes    hydrochlorothiazide (HYDRODIURIL) 12.5 MG tablet Take 1 tablet (12.5 mg) by mouth 2 times daily 1/23/2023 Yes Percy Alcala MD Yes    insulin aspart (NOVOLOG FLEXPEN) 100 UNIT/ML pen INJECT 10 UNITS UNDER THE SKIN WITH MEALS,  PLUS CORRECTION. Additional correction scale insulin as follows based on pre-meal glucose: 140 - 160 = + 1 unit;  161 - 180 = + 2 units, 181 - 200 = + 3 units, 201-220 = +4 units, 221-240 = +5 units, 241-260 = +6 units, 261-280 = +7 units, 281-300 = +8 units 1/23/2023 Yes Madeline Vasquez MD Yes    insulin glargine U-300 (TOUJEO MAX SOLOSTAR) 300 UNIT/ML (2 units dial) pen Inject subcu Toujeo 40 units at bedtime 1/22/2023 Yes Madeline Vasquez MD Yes    insulin pen needle (BD RIVER U/F) 32G X 4 MM miscellaneous Use 6 daily or as directed 1/23/2023 Yes Isela Archibald PA-C No    isosorbide dinitrate (ISORDIL) 20 MG tablet Take 1 tablet (20 mg) by mouth 3 times daily 1/23/2023 Yes Yuliet Renee, APRN CNP Yes    levothyroxine (SYNTHROID/LEVOTHROID) 200 MCG tablet Take 1 tablet (200mcg) daily Monday-Saturday and take 2 tablets (400mcg) on Sunday 1/23/2023 Yes Unknown, Entered By History No    metoprolol succinate ER (TOPROL XL) 25 MG 24 hr tablet Take 3 tablets (75 mg) by mouth daily 1/23/2023 Yes Madeline Vasquez MD Yes    nystatin (MYCOSTATIN) 243862 UNIT/GM external cream Apply topically 2 times daily To toenails 1/22/2023 Yes Norman Jean DPM     polyethylene glycol (MIRALAX/GLYCOLAX) powder  Unknown Yes Reported, Patient     prednisoLONE acetate (PRED FORTE) 1 % ophthalmic suspension As directed 1 Drop 3 times daily. Follow physician instructions for the operative eye. Unknown Yes Sun Degroot MD     senna (SENOKOT) 8.6 MG tablet Take 1 tablet by mouth 2 times daily Unknown Yes Lenore Sierra PA-C     spironolactone (ALDACTONE) 50 MG tablet Take 1 tablet (50 mg) by mouth daily Past Week Yes Madeline Vasquez MD Yes    tiotropium (SPIRIVA HANDIHALER) 18 MCG inhalation capsule Inhale contents of one capsule daily. 1/23/2023 Yes Roc Plasencia MD Yes    topiramate (TOPAMAX) 25 MG tablet 25 mg at bedtime for 1 week, 50 mg at bedtime for 1 week and 75 mg daily at bedtime thereafter Past Week Yes  Silva Damon MD Yes    ACCU-CHEK SANGEETA PLUS test strip USE TO TEST BLOOD SUGAR FOUR TIMES A DAY  OR AS DIRECTED.   Isela Archibadl PA-C     blood glucose (NO BRAND SPECIFIED) lancets standard USE TO CHECK  blood sugar fasting each am  prelunch and predinner daily OR AS DIRECTED Call clinic to schedule MD APPT.   Isela Archibald PA-C     blood glucose (NO BRAND SPECIFIED) test strip Use to test blood sugar 4 times daily or as directed. Accu check Sangeeta plus   Lulu Lr MD     blood glucose (NO BRAND SPECIFIED) test strip Use to test blood sugar 4 times daily or as directed.   Vladislav Wright MD     blood glucose (NO BRAND SPECIFIED) test strip Use to test blood sugars 3 times daily or as directed   Isela Archibald PA-C     blood glucose monitoring (ACCU-CHEK FASTCLIX) lancets Use to test blood sugar 4 times daily or as directed.or lancets that go with meter being used   Isela Archibald PA-C     blood glucose monitoring (IBG STAR) meter device kit -PLEASE GIVE PATIENT A DEVICE HER INSURANCE WILL COVER-   Isela Archibald PA-C     blood glucose monitoring (NO BRAND SPECIFIED) meter device kit Use to test blood sugar 4  times daily or as directed.   Isela Archibald PA-C     blood glucose monitoring (NO BRAND SPECIFIED) meter device kit Use to test blood sugar 3 times daily or as directed.   Isela Archibald PA-C     Blood Pressure KIT Use to check blood pressure as directed, once daily and as needed. Record results.   Karen Velasquez PA-C     Continuous Blood Gluc Sensor (FREESTYLE BELLO 14 DAY SENSOR) MISC Change every 14 days.   Madeline Vasquez MD Yes    Continuous Blood Gluc Sensor (FREESTYLE BELLO 14 DAY SENSOR) MISC 1 each every 14 days   Madeline Vasquez MD Yes    Continuous Blood Gluc Sensor (FREESTYLE BELLO 2 SENSOR) MISC 1 Units every 14 days Check BG 4 times daily before meals and at bedtime.   Madeline Vasquez MD Yes     glucagon 1 MG SOLR injection Inject 1 mg Subcutaneous every 15 minutes as needed for low blood sugar   Madeline Vasquez MD Yes    order for DME Left foot   Norman Jean DPM     order for DME Equipment being ordered: [id number] $92   Plantar, fasciitis, LG, night splint   Norman Jean DPM     order for DME Equipment being ordered: Challenger Wide walker if available - patient needs seat, basket and brakes.   Ximena Carpenter NP     ORDER FOR DME Use your CPAP device as directed by your provider. Pressure change to min 13 max 18cwp   Martina Jesus MD     Respiratory Therapy Supplies (NEBULIZER COMPRESSOR) KIT 1 Device 4 times daily as needed.   Roc Plasencia MD     Semaglutide, 1 MG/DOSE, 4 MG/3ML SOPN Inject 1 mg Subcutaneous once a week   Isela Archibald PA-C                Current Medications:     Current Facility-Administered Medications   Medication     albuterol (PROVENTIL HFA/VENTOLIN HFA) inhaler     albuterol (PROVENTIL) neb solution 2.5 mg     apixaban ANTICOAGULANT (ELIQUIS) tablet 5 mg     atorvastatin (LIPITOR) tablet 40 mg     bisacodyl (DULCOLAX) suppository 10 mg     bumetanide (BUMEX) injection 5 mg     clotrimazole (LOTRIMIN) 1 % cream     glucose gel 15-30 g    Or     dextrose 50 % injection 25-50 mL    Or     glucagon injection 1 mg     gabapentin (NEURONTIN) capsule 600 mg     hydrALAZINE (APRESOLINE) tablet 50 mg     insulin aspart (NovoLOG) injection (RAPID ACTING)     insulin aspart (NovoLOG) injection (RAPID ACTING)     insulin glargine (LANTUS PEN) injection 24 Units     isosorbide dinitrate (ISORDIL) tablet 20 mg     ketorolac (ACULAR) 0.5 % ophthalmic solution 1 drop     levothyroxine (SYNTHROID/LEVOTHROID) tablet 200 mcg     [START ON 1/29/2023] levothyroxine (SYNTHROID/LEVOTHROID) tablet 400 mcg     lidocaine (LMX4) cream     lidocaine 1 % 0.1-5 mL     metoprolol succinate ER (TOPROL XL) 24 hr tablet 25 mg     miconazole (MICATIN) 2 % cream      nicotine (NICODERM CQ) 14 MG/24HR 24 hr patch 1 patch    And     nicotine Patch in Place     nystatin (MYCOSTATIN) cream     oxyCODONE-acetaminophen (PERCOCET) 5-325 MG per tablet 1 tablet     Patient is already receiving anticoagulation with heparin, enoxaparin (LOVENOX), warfarin (COUMADIN)  or other anticoagulant medication     Reason ACE/ARB/ARNI order not selected     sennosides (SENOKOT) tablet 8.6 mg     sodium chloride (PF) 0.9% PF flush 10-40 mL     [Held by provider] spironolactone (ALDACTONE) tablet 50 mg     terbinafine (lamISIL) 1 % cream     topiramate (TOPAMAX) tablet 75 mg     umeclidinium (INCRUSE ELLIPTA) 62.5 MCG/ACT inhaler 1 puff     Facility-Administered Medications Ordered in Other Encounters   Medication     sodium chloride (PF) 0.9% PF flush 10 mL             Family History:     Family History   Problem Relation Age of Onset     Thyroid Disease Mother         Grave's D     Diabetes Mother      Heart Disease Mother      Cerebrovascular Disease Mother         dec. 56 yo     Hypertension Mother      Heart Disease Sister         Heart Murmur     Diabetes Sister      Heart Disease Father         dec in his 30s, MI     Psychotic Disorder Brother         Bipolar     Diabetes Brother      Thyroid Disease Brother         Hyper Thyroid     Heart Disease Brother      Thyroid Disease Sister         Hyper Thyroid     Thyroid Disease Sister         Hyper Thyroid     Thyroid Disease Sister         Mental Health Problems     Thyroid Disease Maternal Aunt      Thyroid Disease Maternal Uncle      Cancer No family hx of      Glaucoma No family hx of      Macular Degeneration No family hx of              Social History:     Social History     Tobacco Use     Smoking status: Light Smoker     Packs/day: 0.25     Years: 13.00     Pack years: 3.25     Types: Cigarettes     Smokeless tobacco: Never     Tobacco comments:     down to 2 cigs a day   Substance Use Topics     Alcohol use: No               Physical Exam:  "    /73 (BP Location: Right arm)   Pulse 114   Temp 97.3  F (36.3  C) (Oral)   Resp 20   Ht 1.702 m (5' 7\")   Wt (!) 294.8 kg (650 lb)   SpO2 100%   .80 kg/m      General: NAD  HEENT: EOMI, nonicteric. No goiter or palpable nodule.   Chest: Clear to auscultation bilaterally  Cardio: S1-S2 heard, no M/R/G  Abdomen: Soft, nontender, BS +  MSK: No pedal edema  Skin: No rash noted  Neuro: AAOx3, neuro exam grossly nonfocal  Psych: Calm        Labs:     Recent Results (from the past 24 hour(s))   EKG 12-lead, tracing only    Collection Time: 01/23/23  3:21 PM   Result Value Ref Range    Systolic Blood Pressure  mmHg    Diastolic Blood Pressure  mmHg    Ventricular Rate 120 BPM    Atrial Rate 92 BPM    MS Interval  ms    QRS Duration 74 ms     ms    QTc 443 ms    P Axis  degrees    R AXIS 209 degrees    T Axis 94 degrees    Interpretation ECG       Undetermined rhythm  Low voltage QRS  Possible Anterolateral infarct , age undetermined  Abnormal ECG  Unconfirmed report - interpretation of this ECG is computer generated - see medical record for final interpretation  Confirmed by - EMERGENCY ROOM, PHYSICIAN ([id number]),  AIRAM ROGER ([id number]) on 1/23/2023 3:26:45 PM     Comprehensive metabolic panel    Collection Time: 01/23/23  3:37 PM   Result Value Ref Range    Sodium 135 (L) 136 - 145 mmol/L    Potassium 5.0 3.4 - 5.3 mmol/L    Chloride 99 98 - 107 mmol/L    Carbon Dioxide (CO2) 21 (L) 22 - 29 mmol/L    Anion Gap 15 7 - 15 mmol/L    Urea Nitrogen 56.2 (H) 6.0 - 20.0 mg/dL    Creatinine 1.56 (H) 0.51 - 0.95 mg/dL    Calcium 9.1 8.6 - 10.0 mg/dL    Glucose 346 (H) 70 - 99 mg/dL    Alkaline Phosphatase 120 (H) 35 - 104 U/L    AST 23 10 - 35 U/L    ALT 16 10 - 35 U/L    Protein Total 6.8 6.4 - 8.3 g/dL    Albumin 3.0 (L) 3.5 - 5.2 g/dL    Bilirubin Total 1.6 (H) <=1.2 mg/dL    GFR Estimate 41 (L) >60 mL/min/1.73m2   Troponin T, High Sensitivity    Collection Time: 01/23/23  3:37 PM   Result Value " Ref Range    Troponin T, High Sensitivity 75 (H) <=14 ng/L   Magnesium    Collection Time: 01/23/23  3:37 PM   Result Value Ref Range    Magnesium 1.6 (L) 1.7 - 2.3 mg/dL   Nt probnp inpatient (BNP)    Collection Time: 01/23/23  3:37 PM   Result Value Ref Range    N terminal Pro BNP Inpatient 5,891 (H) 0 - 450 pg/mL   CRP inflammation    Collection Time: 01/23/23  3:37 PM   Result Value Ref Range    CRP Inflammation 40.70 (H) <5.00 mg/L   CBC with platelets and differential    Collection Time: 01/23/23  3:37 PM   Result Value Ref Range    WBC Count 9.4 4.0 - 11.0 10e3/uL    RBC Count 5.22 (H) 3.80 - 5.20 10e6/uL    Hemoglobin 15.6 11.7 - 15.7 g/dL    Hematocrit 49.8 (H) 35.0 - 47.0 %    MCV 95 78 - 100 fL    MCH 29.9 26.5 - 33.0 pg    MCHC 31.3 (L) 31.5 - 36.5 g/dL    RDW 18.9 (H) 10.0 - 15.0 %    Platelet Count 257 150 - 450 10e3/uL    % Neutrophils 72 %    % Lymphocytes 17 %    % Monocytes 8 %    % Eosinophils 0 %    % Basophils 1 %    % Immature Granulocytes 2 %    NRBCs per 100 WBC 0 <1 /100    Absolute Neutrophils 6.8 1.6 - 8.3 10e3/uL    Absolute Lymphocytes 1.6 0.8 - 5.3 10e3/uL    Absolute Monocytes 0.7 0.0 - 1.3 10e3/uL    Absolute Eosinophils 0.0 0.0 - 0.7 10e3/uL    Absolute Basophils 0.1 0.0 - 0.2 10e3/uL    Absolute Immature Granulocytes 0.2 <=0.4 10e3/uL    Absolute NRBCs 0.0 10e3/uL   Extra Green Top (Lithium Heparin) Tube    Collection Time: 01/23/23  3:37 PM   Result Value Ref Range    Hold Specimen [facility]    Extra Purple Top Tube    Collection Time: 01/23/23  3:37 PM   Result Value Ref Range    Hold Specimen [facility]    TSH with free T4 reflex    Collection Time: 01/23/23  3:37 PM   Result Value Ref Range    TSH 20.40 (H) 0.30 - 4.20 uIU/mL   T4 free    Collection Time: 01/23/23  3:37 PM   Result Value Ref Range    Free T4 0.82 (L) 0.90 - 1.70 ng/dL   Extra Blue Top Tube    Collection Time: 01/23/23  3:39 PM   Result Value Ref Range    Hold Specimen [facility]    Extra Red Top Tube    Collection Time: 01/23/23   3:39 PM   Result Value Ref Range    Hold Specimen [facility]    Asymptomatic Influenza A/B & SARS-CoV2 (COVID-19) Virus PCR Multiplex Nasopharyngeal    Collection Time: 01/23/23  3:49 PM    Specimen: Nasopharyngeal; Swab   Result Value Ref Range    Influenza A PCR Negative Negative    Influenza B PCR Negative Negative    RSV PCR Negative Negative    SARS CoV2 PCR Negative Negative   Troponin T, High Sensitivity    Collection Time: 01/23/23  5:44 PM   Result Value Ref Range    Troponin T, High Sensitivity 60 (H) <=14 ng/L   Lactic acid whole blood    Collection Time: 01/23/23  6:54 PM   Result Value Ref Range    Lactic Acid 2.2 (H) 0.7 - 2.0 mmol/L   Extra Blood Culture Bottle    Collection Time: 01/23/23  7:33 PM   Result Value Ref Range    Hold Specimen [facility]    EKG 12 lead    Collection Time: 01/23/23  7:46 PM   Result Value Ref Range    Systolic Blood Pressure  mmHg    Diastolic Blood Pressure  mmHg    Ventricular Rate 119 BPM    Atrial Rate 111 BPM    UT Interval  ms    QRS Duration 72 ms     ms    QTc 475 ms    P Axis  degrees    R AXIS 147 degrees    T Axis -85 degrees    Interpretation ECG       Atrial fibrillation with rapid ventricular response  Low voltage QRS  Possible Anterolateral infarct , age undetermined  Abnormal ECG  Unconfirmed report - interpretation of this ECG is computer generated - see medical record for final interpretation  Confirmed by - EMERGENCY ROOM, PHYSICIAN ([id number]),  AIRAM ROGER ([id number]) on 1/23/2023 8:51:46 PM     UA with Microscopic reflex to Culture    Collection Time: 01/23/23  8:54 PM    Specimen: Urine, Keenan Catheter   Result Value Ref Range    Color Urine Light Yellow Colorless, Straw, Light Yellow, Yellow    Appearance Urine Clear Clear    Glucose Urine Negative Negative mg/dL    Bilirubin Urine Negative Negative    Ketones Urine Negative Negative mg/dL    Specific Gravity Urine 1.008 1.003 - 1.035    Blood Urine Trace (A) Negative    pH Urine 5.0 5.0 - 7.0    Protein  Albumin Urine 20 (A) Negative mg/dL    Urobilinogen Urine 2.0 Normal, 2.0 mg/dL    Nitrite Urine Negative Negative    Leukocyte Esterase Urine Negative Negative    Bacteria Urine Moderate (A) None Seen /HPF    Mucus Urine Present (A) None Seen /LPF    RBC Urine 1 <=2 /HPF    WBC Urine <1 <=5 /HPF    Squamous Epithelials Urine <1 <=1 /HPF    Hyaline Casts Urine 8 (H) <=2 /LPF   Glucose by meter    Collection Time: 01/23/23 10:31 PM   Result Value Ref Range    GLUCOSE BY METER POCT 184 (H) 70 - 99 mg/dL   Lactic acid whole blood    Collection Time: 01/23/23 10:32 PM   Result Value Ref Range    Lactic Acid 1.6 0.7 - 2.0 mmol/L   Magnesium    Collection Time: 01/24/23  6:50 AM   Result Value Ref Range    Magnesium 1.5 (L) 1.7 - 2.3 mg/dL   Basic metabolic panel    Collection Time: 01/24/23  6:50 AM   Result Value Ref Range    Sodium 139 136 - 145 mmol/L    Potassium 4.7 3.4 - 5.3 mmol/L    Chloride 101 98 - 107 mmol/L    Carbon Dioxide (CO2) 25 22 - 29 mmol/L    Anion Gap 13 7 - 15 mmol/L    Urea Nitrogen 55.7 (H) 6.0 - 20.0 mg/dL    Creatinine 1.62 (H) 0.51 - 0.95 mg/dL    Calcium 9.0 8.6 - 10.0 mg/dL    Glucose 120 (H) 70 - 99 mg/dL    GFR Estimate 39 (L) >60 mL/min/1.73m2   INR    Collection Time: 01/24/23  6:50 AM   Result Value Ref Range    INR 1.57 (H) 0.85 - 1.15   Comprehensive metabolic panel    Collection Time: 01/24/23  6:50 AM   Result Value Ref Range    Sodium 139 136 - 145 mmol/L    Potassium 4.7 3.4 - 5.3 mmol/L    Chloride 101 98 - 107 mmol/L    Carbon Dioxide (CO2) 25 22 - 29 mmol/L    Anion Gap 13 7 - 15 mmol/L    Urea Nitrogen 55.7 (H) 6.0 - 20.0 mg/dL    Creatinine 1.62 (H) 0.51 - 0.95 mg/dL    Calcium 9.0 8.6 - 10.0 mg/dL    Glucose 120 (H) 70 - 99 mg/dL    Alkaline Phosphatase 106 (H) 35 - 104 U/L    AST 23 10 - 35 U/L    ALT 11 10 - 35 U/L    Protein Total 6.3 (L) 6.4 - 8.3 g/dL    Albumin 2.7 (L) 3.5 - 5.2 g/dL    Bilirubin Total 1.4 (H) <=1.2 mg/dL    GFR Estimate 39 (L) >60 mL/min/1.73m2   CBC  with platelets and differential    Collection Time: 01/24/23  6:50 AM   Result Value Ref Range    WBC Count 8.5 4.0 - 11.0 10e3/uL    RBC Count 5.03 3.80 - 5.20 10e6/uL    Hemoglobin 15.0 11.7 - 15.7 g/dL    Hematocrit 48.9 (H) 35.0 - 47.0 %    MCV 97 78 - 100 fL    MCH 29.8 26.5 - 33.0 pg    MCHC 30.7 (L) 31.5 - 36.5 g/dL    RDW 18.8 (H) 10.0 - 15.0 %    Platelet Count 242 150 - 450 10e3/uL    % Neutrophils 65 %    % Lymphocytes 25 %    % Monocytes 7 %    % Eosinophils 1 %    % Basophils 1 %    % Immature Granulocytes 1 %    NRBCs per 100 WBC 0 <1 /100    Absolute Neutrophils 5.5 1.6 - 8.3 10e3/uL    Absolute Lymphocytes 2.1 0.8 - 5.3 10e3/uL    Absolute Monocytes 0.6 0.0 - 1.3 10e3/uL    Absolute Eosinophils 0.1 0.0 - 0.7 10e3/uL    Absolute Basophils 0.1 0.0 - 0.2 10e3/uL    Absolute Immature Granulocytes 0.1 <=0.4 10e3/uL    Absolute NRBCs 0.0 10e3/uL   Glucose by meter    Collection Time: 01/24/23  8:01 AM   Result Value Ref Range    GLUCOSE BY METER POCT 172 (H) 70 - 99 mg/dL   Echocardiogram Complete    Collection Time: 01/24/23 10:03 AM   Result Value Ref Range    LVEF  40-45% (mildly reduced)                 Assessment and Plan:   1.  Primary hypothyroidism -uncontrolled  In the setting of post radioablation in 2013 for Graves' disease  Current meds at home: Levothyroxine 200 mcg M-Sat- 400 mcg on Sunday  Labs on 1/23/2023 revealed that her TSH is elevated, and her free T4 is low.  When compared to the labs from 12/25/2022, there is a trend of improvement in her FT4.  This is likely because patient was restarting her levothyroxine.  Her TSH is currently unreliable because she just restarted her levothyroxine, and so we will need to base the decision of her levothyroxine dose on the free T4 level.    Recommendation:  Continue levothyroxine 200 mcg M-Sat- 400 mcg on Sunday.  Recheck TFT in a month.   Consider checking celiac labs if primary has concern of malabsorption.    2.  Uncontrolled type 2  diabetes  Patient has big discrepancy of her insulin regimen at home and in the hospital, likely due to different meal intake.  During her last hospitalization 1 month ago, while she was in the hospital, she was given lantus 40 units daily, novolog meal time insulin 1:8 g CHO, and novolog 2/30 sliding scale TID AC and HS.  Her home meds (prior to the last hospitalization) were toujeo 225 units daily, novolog 100 units with each meal + CS, and ozempic 1 mg weekly   She could not tolerate empaglifozin due to frequent UTIs and metformin due to GI side effects.    Recommendation: (ordered for you)  Increase Lantus to 40 units daily   Start NovoLog mealtime insulin 1:8 g CHO  Continue aspart HIRD  Hypoglycemia protocol  Check BG 3 times daily AC, at bedtime, 2 AM  Home Ozempic is on hold     On discharge patient will need close follow-up with endocrine for her thyroid and diabetes.    Discussed with attending  Vladislav Disla MD  Endocrine Fellow  Pager # [phone]          Physician Attestation   I saw this patient with Dr. Disla and agree with the findings and plan of care as documented in the note. Date of Service (when I saw the patient): 1/24/2023    45 MINUTES SPENT BY ME on the date of service doing chart review, history, exam, documentation & further activities per the note.      Daniella Owens MD  Endocrinology and Diabetes   [phone]

## 2023-01-24 NOTE — PROGRESS NOTES
Brief Care Coordination Note    Call received from pt's  through Flores Huerta ([phone]). Flores confirmed that the patient receives 10hrs of PCA care/day, 4hrs of nightly supervision/day, 4hrs of homemaking/week, and 4hrs of IHS per week. Patient has an emergency response device (Life Alert). Flores would like to be updated on discharge planning, notes that patient has had multiple readmissions in the past couple months.     Care coordination will continue to follow for discharge planning.     Radha Meyers, RNCC, BSN    [facility]    [address]  [address] [place] [address]    [email or web address]    Office: [phone] Pager: [phone]

## 2023-01-24 NOTE — PROGRESS NOTES
Cardiology Progress Note  Pricilla Brown MRN: [id number]  Age: 47 year old, : 23               Subjective       Denies chest pain. Endorses unchanged SOB. Frustrated that she has been retaining water despite medical compliance.           Objective     Vitals:  Temp:  [97.3  F (36.3  C)-98.9  F (37.2  C)] 97.3  F (36.3  C)  Pulse:  [] 114  Resp:  [10-27] 20  BP: ()/() 100/73  FiO2 (%):  [30 %] 30 %  SpO2:  [80 %-100 %] 100 %    Vitals:    23 1506 23   Weight: (!) 214.1 kg (472 lb) (!) 294.8 kg (650 lb)       GENERAL: NAD, on RA  HEENT: EOMI, PERRLA  NECK: Supple and without lymphadenopathy. No visible JVD  CV: S1/S2 heard without murmur, tachycardic heart beat   RESPIRATORY: Bibasilar crackles, no wheezing  GI: Soft and non distended with normoactive bowel sounds present in all quadrants. No tenderness, rebound, guarding  EXTREMITIES: 3+ peripheral edema. Likely necrotic skin on right great and 3rd toes  NEUROLOGIC: OAx 3. CN II-XII grossly intact. No focal deficits.   MUSCULOSKELETAL: No joint swelling or tenderness.             Data:      Labs reviewed    Imaging reviewed.          Medications     Medications    apixaban ANTICOAGULANT  5 mg Oral BID     atorvastatin  40 mg Oral Daily     gabapentin  600 mg Oral TID     hydrALAZINE  50 mg Oral Q6H     insulin aspart  1-10 Units Subcutaneous TID AC     insulin aspart  1-7 Units Subcutaneous At Bedtime     insulin glargine  24 Units Subcutaneous At Bedtime     isosorbide dinitrate  20 mg Oral TID     ketorolac  1 drop Right Eye TID     levothyroxine  200 mcg Oral Once per day on  Sat     [START ON 2023] levothyroxine  400 mcg Oral Weekly     metoprolol succinate ER  25 mg Oral Daily     miconazole   Topical BID     nicotine  1 patch Transdermal Daily    And     nicotine   Transdermal Q8H OPHELIA     nystatin   Topical BID     sennosides  8.6 mg Oral BID     [Held  by provider] spironolactone  50 mg Oral Daily     terbinafine   Topical Daily     topiramate  75 mg Oral At Bedtime     umeclidinium  1 puff Inhalation Daily       - MEDICATION INSTRUCTIONS -       ACE/ARB/ARNI NOT PRESCRIBED             Changes Today:     Today:  - continue IV bumex 5mg BID  - BMP q12hr  - TTE today EF 40-45% (contrast used), RV fx can't be assessed  - endocrine consult  - wound consunt          Assessment and Plan:     ASSESSMENT & PLAN:  Pricilla Brown is a 47 year old female with PMHx of HFrEF (EF 20-25%, 12/2022) 2/2 NICM (diagnosed 2012), long-standing persistent AF (on Apxiban), HTN, insulin-dependent type II DM, morbid obesity, asthma, JYOTI and Graves disease s/p radioiodine ablation c/b hypothyroid, who was admitted on 1/23/2023 for ADHF.     # Acute on chronic systolic HF  # HFrEF (EF 20-25%, 12/2022) 2/2 NICM, ACC/AHA stage D, NYHA FC IV  # Anginal symptoms, c/f possible chronic stable CAD  # Long-standing persistent AF (on Apixaban)  # HTN  Given her recent drop in EF from 40 to 20% and possible anginal symptom, might need ischemic work up again. Received bumex 5 mg IV in the ED with 1 time unmeasured urine output. BP on the high side of 120/100s, would benefit from afterload reduction  - Etiology: NICM  - Ischemic work up: Coronary angiogram 2012- no significant CAD  - Last TTE: OSH 12/10/2022- EF 20-25%, reduced RV systolic function  - EKG: AF/atypical Aflutter, rate 120 bpm  - Volume status: currently hypervolemic, 472 lbs on admission (EDW likely 472 lbs)  - NT-proBNP: 5900  - Diuretics: PTA bumex 5 BID, and hydrochlorothiazide 12.5 BID  - Inotropes: None  - Guideline-directed therapy as below:  - ACEI/ARB/ARNI: None, given CKD               - Beta-blocker: PTA metoprolol succinate 75 mg qday               - MRA: PTA spironolactone 50 mg qday               - SGLT2i: None; could not tolerate               - Hydralazine/Nitrate: PTA hydralazine 25 mg TID and isordil 20 mg TID  -  Afterload reduction: PTA hydralazine 25 mg TID and isordil 20 mg TID  - Devices: None  - Antiplatelets: None  - Statins: None  - Anticoagulation: PTA apixaban for AF  - SCD PPX: None, not indicated previously  - Mechanical circulatory support: None     Plan:  - bumex gtt  - K Cl 80 mEq BID  - BMP q12hr  - TTE today EF 40-45% (contrast used), RV fx can't be assessed  - Given TTE results, won't pursue ischemic work up at the moment   - Shlomo for strict I/Os  - Daily weight  - Low Na diet & 2 L fluid restriction  - Monitor K & Mg with goal K > 4 & Mg > 2   - Continue hydralazine 50mg q6h  - Continue PTA isordil 20 mg TID  - Reduced PTA metoprolol succinate to 25 mg qday  - Continue holding PTA spironolactone       # Lactic acidosis,corrected  Lactate 2.2 on admission. Likely elevated in the setting of severe ADHF. Downtrended to 1.6 after diuresis and afterload reduction.                  # Hypothyroid  # Graves disease s/p radioiodine ablation c/b hypothyroid   TSH 20.4 and free T4 0.82 on admission.   Plan:  - Endocrine consult, appreciate recs     # Right toe wound, c/f possible skin necrosis  Had wound with possible skin necrosis on right great toe and 3rd toe, but denies numbness or weakness.   Plan:  - US doppler JOSETTE  Normal  - wound consult      --------------------------------------------Chronic Medical Problems-------------------------------------------     # Insulin-dependent type II DM  # Morbid obesity  A1c 13.5 on 12/25/2022. On glargine (Toujeo) 40 units at bedtime  Plan:  - Reduced PTA insulin to lantus 24 units at bedtime  - High-dose sliding scale insulin  - Hold PTA semaglutide     # Asthma  - Continue PTA inhalers (levalbuterol and spiriva)     # JYOTI  - Continue CPAP        Floor Care  Fluids: 1.5L fluid restriction  Food: 2 gram sodium diet  DVT ppx: pta apixaban  Catheters: none  Lines: PIV, PICC  Code Status: full  Discharge plan: inpatient admission, anticipate discharge to prior living situation  in 4-5 days        Patient was discussed with attending physician, MD Andres Gunderson  Internal Medicine Resident (PGY-1)  [facility]

## 2023-01-25 NOTE — PLAN OF CARE
Goal Outcome Evaluation:    Neuro: A&Ox 4.  Calls as needed.  Cardiac: A fib RVR in 110s, BP soft goal of MAP >55  Respiratory: Sating 92%> RA  GI/: Good urine output from phan, no BM this shift  Diet/appetite: healthy PO intake.   Activity:  turned w/ 2, took lyft to get oob  Pain: At acceptable level on current regimen.   Skin: No new deficits noted.  LDA's: PICC placed, L PIV intact.      Plan: Bumex drip started

## 2023-01-25 NOTE — CONSULTS
Discharge Pharmacy Test Claim    Entresto, farxiga, and jardiance are covered with $0 copay [facility].     Test Claim Copay   entresto 0.00   farxiga 0.00   jardiance 0.00       Valentine Stallworth  [facility] Pharmacy Liaison  Ph: [phone] Pager: [phone]   Securely message with the Vocera Web Console (learn more here)

## 2023-01-25 NOTE — PROGRESS NOTES
[facility]   Cardiology   Progress Note     ASSESSMENT/PLAN:  Pricilla Brown is a 47 year old female with PMHx of HFrEF (EF 40-45) 2/2 NICM, long-standing persistent AF (on Apxiban), HTN, insulin-dependent type II DM, morbid obesity, asthma, JYOTI and Graves disease s/p radioiodine ablation c/b hypothyroid, who was admitted on 1/23/2023 for ADHF.    Today's Updates:  -Transferred to [facility] 1 service overnight  -Increased Bumex gtt to 3mg/mL + metolazone 5mg once  -Acetazolamide 500mg IV     # Acute on chronic systolic HF  # HFrEF (EF 20-25%, 12/2022) 2/2 NICM, ACC/AHA stage D, NYHA FC IV  # Anginal symptoms, c/f possible chronic stable CAD  # Long-standing persistent AF (on Apixaban)  # HTN  - Brought in for increasing SOB, MURRAY, chest tightness. Pt recently admitted 12/25-1/1 HF exacerbation, diuresed to 472lbs.  - Weight on admission documented 555lbs  - Etiology: NICM  - Ischemic work up: Coronary angiogram 2012- no significant CAD  - Incresed Bumex gtt to 3mg/mL +metolazone 5mg once  - Acetazolamide 500mg IV   -Decreased PTA hydralazine to 12.5 from 25, given soft systolics 80-100s  - GDMT as below:               - Beta-blocker: Increase metop to home dose 75mg qday               - MRA: PTA spironolactone 50 mg qday held for hypotension, consider  restarting tomorrow if BP increases               - Hydralazine/Nitrate: PTA hydralazine 12.5 mg TID and isordil 20 mg TID   - ACE/ARB/ARNI, SGLT2i: Pharmacy Liaison for SGLT2 & Entresto - $0  copay for all medications - consider adding Jardiance & Entresto and dc'ing  hydral/isordil  - Anticoagulation: PTA apixaban for atrial fibrillation  -Consider Cordella as outpatient  -Low Na diet & 2L fluid restriction  -Monitor K & Mg with goal K >4 & Mg >2, protocol ordered  -Discontinue phan in coming days  -Daily weights     # Hypothyroid  # Graves disease s/p radioiodine ablation c/b hypothyroid   TSH 20.4 and free T4 0.82 on admission.    Plan:  - Appreciate endocrine consult recs  - Continue levothyroxine 200 mcg M-Sat- 400 mcg on Sunday.  Recheck TFT in a month    # Insulin-dependent type II DM  # Morbid obesity  A1c 13.5 on 12/25/2022.   - Lantus increased to 40 units daily  - Novolog mealtime insulin started 1:8g CHO  - Continue high dose sliding scale aspart  -Hypoglycemia protocol  - Check BG TID AC, at bedtime, 2am  -PTA Ozempic held    # Right toe wound, c/f possible skin necrosis  Had wound with possible skin necrosis on right great toe and 3rd toe, but denies numbness or weakness.   Plan:  - US doppler JOSETTE - normal  - Wound consult : betadine pain on toes, lymphedema consult & vascular consult page sent by WOLUZ MARIA    # Asthma  - Continue PTA inhalers (levalbuterol and spiriva)  - Titrate nasal cannula for SpO2>90%    # JYOTI  - Continue CPAP    # Lactic acidosis, resolved  Lactate 2.2 on admission. Likely elevated in the setting of severe ADHF. Downtrended to 1.6 after diuresis and afterload reduction.     FEN: 2g Na diet, 2L fluid restriction  Code status: Full  Prophylaxis:  Apixaban  Isolation: N/A  Disposition: pending diuresis     Patient seen and discussed with Dr. Hillman, who agrees with above plan.    YOCASTA Buckley, CNP  [facility] Cardiology Team  Pager [phone]    Interval History:  Nausea overnight, small amount of emesis, not precipitated by meds or food. Continuing to monitor.   Diuresed w/ bumex gtt @ 2, net negative -1.6L on 1/24    Physical Exam:  Temp:  [93.7  F (34.3  C)-97.9  F (36.6  C)] 97.7  F (36.5  C)  Pulse:  [104-133] 131  Resp:  [17-20] 20  BP: ()/(53-90) 140/86  FiO2 (%):  [30 %] 30 %  SpO2:  [78 %-100 %] 97 %    I/O:    Intake/Output Summary (Last 24 hours) at 1/25/2023 1207  Last data filed at 1/25/2023 0909  Gross per 24 hour   Intake 460 ml   Output 825 ml   Net -365 ml         Wt:   Wt Readings from Last 5 Encounters:   01/24/23 (!) 251.7 kg (555 lb)   01/01/23 (!) 214.4 kg (472 lb 9.6 oz)    10/31/22 (!) 201 kg (443 lb 3.2 oz)   10/26/22 (!) 208.6 kg (459 lb 14.4 oz)   06/21/22 (!) 191 kg (421 lb)         General: NAD  HEENT:  PERRLA, EOMI.   Neck: JVD difficult to appreciate.   CV: RRR. No murmur appreciated. No rubs or gallops. Peripheral radial pulse intact.  Resp: No increased work of breathing or use of accessory muscles, breathing comfortably on room air.  Lung sounds clear throughout/bilaterally  Abdomen:  Normal active bowel sounds.  Abdomen is soft. No distension, non-tender to palpation.    Extremities: Warm. Capillary refill less than 3 sec. 2/4 radial pulses bilaterally.  1/4 pedal pulses bilaterally. +3 LE edema. R foot & toe thickened skin & dark discoloration.   Skin:  Warm and dry. No erythema, rashes, ulceration or diaphoresis.  Neuro: Alert and oriented x3.      Medications:    apixaban ANTICOAGULANT  5 mg Oral BID     atorvastatin  40 mg Oral Daily     gabapentin  600 mg Oral TID     heparin lock flush  5-20 mL Intracatheter Q24H     [Held by provider] hydrALAZINE  50 mg Oral Q6H     insulin aspart   Subcutaneous TID w/meals     insulin aspart  1-10 Units Subcutaneous TID AC     insulin aspart  1-7 Units Subcutaneous At Bedtime     insulin glargine  40 Units Subcutaneous At Bedtime     isosorbide dinitrate  20 mg Oral TID     ketorolac  1 drop Right Eye TID     levothyroxine  200 mcg Oral Once per day on Mon Tue Wed Thu Fri Sat     [START ON 1/29/2023] levothyroxine  400 mcg Oral Weekly     metoprolol succinate ER  25 mg Oral Daily     miconazole   Topical BID     nicotine  1 patch Transdermal Daily    And     nicotine   Transdermal Q8H OPHELIA     nystatin   Topical BID     sennosides  8.6 mg Oral BID     sodium chloride (PF)  10-40 mL Intracatheter Q8H     sodium chloride (PF)  10-40 mL Intracatheter Q8H     [Held by provider] spironolactone  50 mg Oral Daily     terbinafine   Topical Daily     topiramate  75 mg Oral At Bedtime     umeclidinium  1 puff Inhalation Daily        bumetanide 2 mg/hr (23 0650)     - MEDICATION INSTRUCTIONS -       ACE/ARB/ARNI NOT PRESCRIBED         Labs:   CMP  Recent Labs   Lab 23  0034 23  0010 23  1821 23  1640 23  0801 23  0650 23  2231 23  1537     --   --  138  --  139  139  --  135*   POTASSIUM 4.7  --   --  4.9  --  4.7  4.7  --  5.0   CHLORIDE 98  --   --  99  --  101  101  --  99   CO2 27  --   --  27  --  25  25  --  21*   ANIONGAP 13  --   --  12  --  13  13  --  15   * 223* 230* 238*   < > 120*  120*   < > 346*   BUN 57.2*  --   --  57.2*  --  55.7*  55.7*  --  56.2*   CR 1.48*  --   --  1.56*  --  1.62*  1.62*  --  1.56*   GFRESTIMATED 43*  --   --  41*  --  39*  39*  --  41*   JUSTIN 9.2  --   --  9.0  --  9.0  9.0  --  9.1   MAG 2.0  --   --   --   --  1.5*  --  1.6*   PROTTOTAL  --   --   --   --   --  6.3*  --  6.8   ALBUMIN  --   --   --   --   --  2.7*  --  3.0*   BILITOTAL  --   --   --   --   --  1.4*  --  1.6*   ALKPHOS  --   --   --   --   --  106*  --  120*   AST  --   --   --   --   --     ALT  --   --   --   --   --  11  --  16    < > = values in this interval not displayed.     CBC  Recent Labs   Lab 23  0622 23  0650 23  1537   WBC 8.2 8.5 9.4   RBC 5.11 5.03 5.22*   HGB 15.2 15.0 15.6   HCT 50.9* 48.9* 49.8*    97 95   MCH 29.7 29.8 29.9   MCHC 29.9* 30.7* 31.3*   RDW 19.2* 18.8* 18.9*    242 257     INR  Recent Labs   Lab 23  0622 23  0650   INR 1.44* 1.57*     Diagnostics:  EC23 A.fib rate 110      Echo: 23  Recent Results (from the past 24 hour(s))   Echocardiogram Complete   Result Value    LVEF  40-45% (mildly reduced)    [facility]    [id number]  [id number]  [id number]  [id number]^HARSH^MANE     [facility]  Echocardiography Laboratory  [address] [place] [address]     Name: BRAXTON SALMERON  MRN: [id number]  : 1975  Study Date: 2023  08:34 AM  Age: 47 yrs  Gender: Female  Patient Location: [facility]  Reason For Study: SOB  Ordering Physician: MANE HOUSE  Performed By: Eneida Llanes     BSA: 3.3 m2  Height: 67 in  Weight: 650 lb  HR: 100  BP: 133/84 mmHg  ______________________________________________________________________________  Procedure  Complete Portable Echo Adult. Contrast Optison. Technically difficult  study.Extremely difficult acoustic windows despite the use of contrast for  endcardial border definition. Patient was given 6 ml mixture of 3 ml Optison  and 6 ml saline. 3 ml wasted.  ______________________________________________________________________________  Interpretation Summary  Technically difficult study.Extremely difficult acoustic windows despite the  use of contrast for endcardial border definition.  Left ventricular function is decreased. The ejection fraction is 40-45%  (mildly reduced).  Right ventricular function cannot be assessed due to poor image quality.  IVC diameter >2.1 cm collapsing <50% with sniff suggests a high RA pressure  estimated at 15 mmHg or greater.     This study was compared with the study from 6/14/2022. No significant changes  noted.  ______________________________________________________________________________  Left Ventricle  Moderate left ventricular dilation is present. Left ventricular function is  decreased. The ejection fraction is 40-45% (mildly reduced). Left ventricular  diastolic function is not assessable.     Right Ventricle  Right ventricular function cannot be assessed due to poor image quality.     Atria  The atria cannot be assessed.     Mitral Valve  The mitral valve is normal. Trace mitral insufficiency is present.     Aortic Valve  Aortic valve is normal in structure and function. On Doppler interrogation,  there is no significant stenosis or regurgitation.     Tricuspid Valve  The tricuspid valve is normal. Trace tricuspid insufficiency is present. The  right ventricular  systolic pressure is approximated at 14.3 mmHg plus the  right atrial pressure. Pulmonary artery systolic pressure is normal.     Pulmonic Valve  The pulmonic valve is normal.     Vessels  The aorta root is normal. The thoracic aorta is normal. IVC diameter >2.1 cm  collapsing <50% with sniff suggests a high RA pressure estimated at 15 mmHg or  greater.     Pericardium  Trivial pericardial effusion is present.     Compared to Previous Study  This study was compared with the study from 2022 . No significant changes  noted.  ______________________________________________________________________________  MMode/2D Measurements & Calculations     IVSd: 1.3 cm  LVIDd: 6.4 cm  LVIDs: 5.1 cm  LVPWd: 1.1 cm  FS: 20.3 %  LV mass(C)d: 355.9 grams  LV mass(C)dI: 106.7 grams/m2  asc Aorta Diam: 3.3 cm  LVOT diam: 2.1 cm  LVOT area: 3.6 cm2  RWT: 0.35     Doppler Measurements & Calculations  MV E max garrett: 65.4 cm/sec  PA acc time: 0.13 sec  TR max garrett: 188.8 cm/sec  TR max P.3 mmHg  Medial E/e': 12.1     ______________________________________________________________________________  Report approved by: MD Isaak Henao 2023 10:24 AM         US JOSETTE Doppler No Exercise 1-2 Levels Right    Narrative    Exam: Bilateral lower extremity resting ankle brachial indices dated  2023 10:28 AM    Comparison study: None    Clinical history: Dark right toe     Ordering provider: Nina Lundy MD    Technique: Bilateral lower extremity resting ankle brachial indices  obtained.     Findings:    Right:      Arm: 88 mmHg   PT at ankle: 108 mmHg   DP at foot: 116 mmHg   Toe pressure 133 mmHg     JOSETTE: 1.20   TBI: 1.37    Left:     Arm: 97 mmHg   PT at ankle: 99 mmHg   DP at foot: 114 mmHg   Toe pressure 123 mmHg     JOSETTE: 1.18   TBI: 1.27      Impression    Impression:     1. Right lea. Resting JOSETTE is normal, 1.20.  1b. TBI is normal, 1.37.    2. Left lea. Resting JOSETTE is normal,  1.18.  2b. TBI is normal, 1.27.    Guidelines:    JOSETTE Diagnostic Criteria (Based on criteria published in Circulation  2011; 124: 5309-7884):    > 1.4: Non compressible    1.00 - 1.40: Normal    0.91 - 0.99: Borderline    At or below 0.90: Abnormal    JOSETTE Diagnostic Criteria (Based on ACC/AHA guideline 2008):    >/=1.3 - non compressible vessels    1.00  -1.29 - Normal    0.91 - 0.99 - Borderline    0.41 - 0.90 - Mild to moderate PAD    0.00 - 0.40 - Severe PAD    I have personally reviewed the examination and initial interpretation  and I agree with the findings.    CARLENE HANSEN MD         SYSTEM ID:  [id number]   XR Chest Port 1 View    Narrative    Exam: Chest x-ray, 1 view, 1/24/2023 4:32 PM    Indication: PICC    Comparison: X-ray 1/23/2023    Findings:   Frontal radiograph of the chest. Right PICC tip is obscured. No  pneumothorax. Small bilateral pleural effusions. Cardiomegaly.  Bilateral interstitial and airspace opacities. Visualized upper  abdomen and bones are unremarkable.      Impression    Impression:   1. Right PICC tip is obscured  2. Cardiomegaly with small bilateral pleural effusions and bilateral  mixed interstitial and airspace opacities, likely pulmonary edema with  overlying atelectasis.    I have personally reviewed the examination and initial interpretation  and I agree with the findings.    LUIS LE MD         SYSTEM ID:  [id number]   XR Chest Port 1 View    Narrative    EXAM: XR CHEST PORT 1 VIEW  1/24/2023 6:28 PM      HISTORY: PICC line position    COMPARISON: X-ray 1/24/2023    FINDINGS: Single view of the chest. Right PICC tip is obscured but  appears to terminate at the level of the cavoatrial junction. No  pneumothorax. The left costophrenic angle is out of the field of view.  Right pleural effusion. Cardiomegaly. Bilateral diffuse mixed  interstitial and airspace opacities. Bones are grossly unremarkable.       Impression    IMPRESSION:    1. Right PICC tip is obscured  but appears to terminate at the level of  the cavoatrial junction.  2. Diffuse bilateral mixed interstitial and airspace opacities,  pulmonary edema versus atelectasis versus infection.    I have personally reviewed the examination and initial interpretation  and I agree with the findings.    LUIS LE MD         SYSTEM ID:  [id number]       Medical Decision Making       35 MINUTES SPENT BY ME on the date of service doing chart review, history, exam, documentation & further activities per the note.

## 2023-01-25 NOTE — CONSULTS
47 year old female presenting with systolic heart failure. CORE consult requested. Pricilla is currently admitted with heart failure exacerbation.    Pricilla was provided with a CHF book.  I reviewed the importance of daily weights, 2 gm sodium diet, 2L fluid restriction and compliance with medications upon hospital discharge.  CORE contact phone numbers provided and patient is encouraged to call with any questions or concerns, including any weight gain or loss of 2 or more pounds in 24 hours or 5 or more pounds in 1 week.      Appointment made in CORE clinic on 1/10/23 at 1:15 PM.  I will follow-up with the patient at that time, sooner if needed.  Thank you for the consult.    Estella Regan RN BSN  Cardiology Nurse Coordinator - Heart Failure Clinic  [facility]  [phone] option 1 to schedule an appointment or leave a message for your care team

## 2023-01-25 NOTE — PROGRESS NOTES
"      Neuro: A&Ox4.     Cardiac: A-Fib on eliquis. HR tachy, team ok with map >55 /78   Pulse 112   Temp 97.9  F (36.6  C) (Oral)   Resp 17   Ht 1.702 m (5' 7\")   Wt (!) 251.7 kg (555 lb)   SpO2 97%   BMI 86.93 kg/m   The patient refused her HS eliquis d/t N/V, she later took her missed dose this morning pharmacy and Cards 2 was updated. Her Eliquis was rescheduled.        Respiratory: The patient was on Rm before HS. She was then switched to Bipap at 30%,     GI/: On Bumex at 2mg/hr. Keenan intact. She had a large bm this shift. She c/o nausea and was given Prn Zofran with some relief. She refuse majority of her HS meds dt N/V.     Diet/appetite: Tolerating 2GM Sodim diet. Eating well.     Activity:  Assist of 2 with ceiling lift.     Pain: Patient initially c/o general pain, then declined pain med after having on set of nausea.      Skin: No new deficit, pt right great toe has eschar and is open to air.     LDA's: Right TLPICC with Bumex infusing at 2m/hr, Keenan intact and left PIV. The patient's PICC changing was changed d/t dressing being soiled.     Plan: Monitor BP/maps, continue on Bumex, check BG's and manage pain.   "

## 2023-01-26 NOTE — PLAN OF CARE
SBP: 's, T: 97.5, P: 110-130's, R: 20    Neuro: A&Ox4. Sleepy between cares.  Cardiac: Afib 110-130's. BP 's/60-80's.    Respiratory: Sating 96% on RA.  GI/: Adequate urine output via phan. Bumex gtt at 12mL/hr. Last BM 1/24  Diet/appetite: Tolerating reg diet. Eating well.  Activity:  Assist of 3 with ceiling lift.  Pain: Percocet given x1. Pt c/o pain before another dose can be given. MD ordered Tylenol, Flexeril and Atarax. Tylenol given with min relief. Atarax and Flexeril given.     Skin: Betadine put on R toes per order.   LDA's: PIV x1, R PICC infusing Bumex at 12mL/hr. Phan with large output.    Plan: Continue with POC. Notify primary team with changes.

## 2023-01-26 NOTE — PROGRESS NOTES
[facility]   Cardiology   Progress Note     ASSESSMENT/PLAN:  Pricilla Brown is a 47 year old year old female with PMH of  HFmrEF (EF 40-45) 2/2 NICM, long-standing persistent AF, HTN, type II DM, morbid obesity, asthma, JYOTI and Graves disease s/p radioiodine ablation c/b hypothyroid, who was admitted on 1/23/2023 for heart failure exacerbation     Today's Updates:  - Continue Bumex gtt  - Give Diamox 500 mg IV x 1 (likely repeat dose in afternoon)  - Lymphedema consult  - Reduce metoprolol  - Start Diltiazem for better HR control     # Acute on chronic systolic heart failure with mildly reduced EF (EF 40-45%)  # Long-standing persistent afib   # HTN  # Lactic acidosis 2/2 CHF exacerbation, resolved  # Troponin elevation 2/2 CHF  Pt admitted with c/o increasing SOB, MURRAY, chest tightness, weight gain in spite of compliance with PO diuretics. NT BNP 5891 (was 2030 during most recent admission).  Lactic acid 2.2 on admission with improvement to 1.1 with diuresis. Troponin mildly elevated, 67-->75-->60. 2/2 CHF exacerbation, no concern for ACS currently. Most recent Echo with EF 40-45%. Coronary angiogram 2012 with no significant CAD. Of note, pt recently admitted 12/25-1/1 HF exacerbation, diuresed to 472lbs. Pt weight on admission 1/23 555lbs.     - Volume Status: hypervolemic, continue Bumex gtt, give additional 500mg IV Diamox this am, pending I/O, may give additional dose this afernoon  - Beta-blocker: Reduce PTA Metoprolol to 50 mg daily   - ACEi/ARB/ARNI: pending Cr, BP, will start Entresto (likely in coming days)  - Continue PTA hydral/isordil for now, plan to discontinue when Entresto started  - SGLT2i: not indicated in setting of reccurent yeast infections  - Rate control: BB as above, start PO Dilt 180 mg BID  - Anticoagulation: CHADSVASC 3, continue PTA apixaban  - Strict I/O, daily weights  - BID BMP while aggressively diuresing, with lyte replacement per procotol   - CORE  consult  - Lymphedema consult       # Hypothyroidism  # Graves disease s/p radioiodine ablation c/b hypothyroid   TSH 20.4 and free T4 0.82 on admission.   - Endocrine consulted, recs:   - Continue levothyroxine 200 mcg M-Sat- 400 mcg on Sunday.  Recheck TFT in a month     # Type II DM  # Morbid obesity  A1c 13.5 on 12/25/2022.   - Continue PTA Lantus 40 units daily  - Novolog mealtime insulin  1:8g CHO  - Continue high dose sliding scale   - POCT glucose checks  - Hypoglycemia protocol  - PTA Ozempic held while IP     # Right toe wound  Present on admission, right great toe and 3rd toe, dry gangrene, pt unaware how long wounds have been present. . US doppler JOSETTE normal.   - WOCN following: wound cares to right toe daily, Paint toes with betadine. Be sure to apply over dry eschar. Ok to leave open to air. No soaking feet.  - Consider vascular consult if no improvement with wound cares     # Asthma  - Continue PTA inhalers (levalbuterol and spiriva)     # JYOTI  - Continue CPAP      FEN: 2 gm Sodium, 2L Fluid restriction  Code status: Full  Prophylaxis: PO Eliquis  Isolation: None  Disposition: possible discharge home 5-7 days pending stable volume status    Patient seen and discussed with Dr. Hillman, who agrees with above plan.    Angela RUST, CNP  [facility] Cardiology Team  Pager [phone]/[phone]    Interval History:  - No acute events overnight  - Patient remains on Bumex gtt, net negative 2.9L over night  - Cr stable  - Pt reports breathing feeling better this am, she has not ambulated yet    Physical Exam:  Temp:  [97.7  F (36.5  C)-98.7  F (37.1  C)] 98  F (36.7  C)  Pulse:  [] 124  Resp:  [20-24] 20  BP: ()/(73-99) 114/86  FiO2 (%):  [30 %] 30 %  SpO2:  [89 %-100 %] 97 %    I/O:  Intake/Output Summary (Last 24 hours) at 1/26/2023 0939  Last data filed at 1/26/2023 0808  Gross per 24 hour   Intake 1200 ml   Output 5375 ml   Net -4175 ml       Wt:   Wt Readings from Last 5 Encounters:   01/26/23 (!) 237  kg (522 lb 8 oz)   01/01/23 (!) 214.4 kg (472 lb 9.6 oz)   10/31/22 (!) 201 kg (443 lb 3.2 oz)   10/26/22 (!) 208.6 kg (459 lb 14.4 oz)   06/21/22 (!) 191 kg (421 lb)       General: NAD  HEENT:  PERRLA, EOMI.   Neck: unable to assess JVP 2/2 body habitus  CV: irregularly irregular rhythm, rate tachycardic. Peripheral radial pulse intact.  Resp: No increased work of breathing or use of accessory muscles, breathing comfortably on room air.  Lung sounds diminished throughout  Abdomen:  Normal active bowel sounds.  Abdomen is rounded, soft. No distension, non-tender to palpation.    Extremities: Warm. Capillary refill less than 3 sec. 2/4 radial pulses bilaterally.  2/4 pedal pulses bilaterally. 2+ BLE edema. No cyanosis or clubbing.  Skin:  Warm and dry. No erythema, rashes, ulceration or diaphoresis.  Neuro: Alert and oriented x3.      Medications:    apixaban ANTICOAGULANT  5 mg Oral BID     atorvastatin  40 mg Oral Daily     diltiazem ER COATED BEADS  180 mg Oral BID     gabapentin  600 mg Oral TID     heparin lock flush  5-20 mL Intracatheter Q24H     hydrALAZINE  12.5 mg Oral TID     insulin aspart   Subcutaneous TID w/meals     insulin aspart  1-10 Units Subcutaneous TID AC     insulin aspart  1-7 Units Subcutaneous At Bedtime     insulin glargine  40 Units Subcutaneous At Bedtime     isosorbide dinitrate  20 mg Oral TID     ketorolac  1 drop Right Eye TID     levothyroxine  200 mcg Oral Once per day on Mon Tue Wed Thu Fri Sat     [START ON 1/29/2023] levothyroxine  400 mcg Oral Weekly     metoprolol succinate ER  50 mg Oral Daily     miconazole   Topical BID     nicotine  1 patch Transdermal Daily    And     nicotine   Transdermal Q8H OPHELIA     nystatin   Topical BID     sennosides  8.6 mg Oral BID     sodium chloride (PF)  10-40 mL Intracatheter Q8H     sodium chloride (PF)  10-40 mL Intracatheter Q8H     spironolactone  50 mg Oral Daily     terbinafine   Topical Daily     topiramate  75 mg Oral At Bedtime      umeclidinium  1 puff Inhalation Daily       bumetanide 3 mg/hr (01/26/23 0244)     - MEDICATION INSTRUCTIONS -       ACE/ARB/ARNI NOT PRESCRIBED         Labs:   CMP  Recent Labs   Lab 01/26/23  0824 01/26/23  0502 01/25/23  2225 01/25/23  1914 01/25/23  1811 01/25/23  1253 01/25/23  0913 01/25/23  0755 01/25/23  0622 01/25/23  0034 01/24/23  0801 01/24/23  0650 01/23/23  2231 01/23/23  1537   NA  --  140  --   --  135*  --  135*  --  136 138   < > 139  139  --  135*   POTASSIUM  --  4.4  --   --  4.6  --  4.9  --  5.0 4.7   < > 4.7  4.7  --  5.0   CHLORIDE  --  101  --   --  95*  --  96*  --  98 98   < > 101  101  --  99   CO2  --  32*  --   --  30*  --  28  --  26 27   < > 25  25  --  21*   ANIONGAP  --  7  --   --  10  --  11  --  12 13   < > 13  13  --  15   * 102* 209* 187* 204*   < > 212*   < > 179* 235*   < > 120*  120*   < > 346*   BUN  --  61.7*  --   --  60.3*  --  59.1*  --  60.1* 57.2*   < > 55.7*  55.7*  --  56.2*   CR  --  1.56*  --   --  1.51*  --  1.52*  --  1.50* 1.48*   < > 1.62*  1.62*  --  1.56*   GFRESTIMATED  --  41*  --   --  42*  --  42*  --  43* 43*   < > 39*  39*  --  41*   JUSTIN  --  8.6  --   --  9.0  --  8.9  --  9.1 9.2   < > 9.0  9.0  --  9.1   MAG  --  2.1  --   --   --   --  2.0  --  2.2 2.0  --  1.5*  --  1.6*   PROTTOTAL  --   --   --   --   --   --   --   --   --   --   --  6.3*  --  6.8   ALBUMIN  --   --   --   --   --   --   --   --   --   --   --  2.7*  --  3.0*   BILITOTAL  --   --   --   --   --   --   --   --   --   --   --  1.4*  --  1.6*   ALKPHOS  --   --   --   --   --   --   --   --   --   --   --  106*  --  120*   AST  --   --   --   --   --   --   --   --   --   --   --  23  --  23   ALT  --   --   --   --   --   --   --   --   --   --   --  11  --  16    < > = values in this interval not displayed.     CBC  Recent Labs   Lab 01/26/23  0502 01/25/23  0622 01/24/23  0650 01/23/23  1537   WBC 6.6 8.2 8.5 9.4   RBC 5.09 5.11 5.03 5.22*   HGB 15.1 15.2 15.0  15.6   HCT 52.2* 50.9* 48.9* 49.8*   * 100 97 95   MCH 29.7 29.7 29.8 29.9   MCHC 28.9* 29.9* 30.7* 31.3*   RDW 18.6* 19.2* 18.8* 18.9*    249 242 257     INR  Recent Labs   Lab 01/25/23  0622 01/24/23  0650   INR 1.44* 1.57*     Arterial Blood GasNo lab results found in last 7 days.    Diagnostics:  ECG 1/23/23 AFib,       Echo 1/24/2023  Interpretation Summary  Technically difficult study.Extremely difficult acoustic windows despite the  use of contrast for endcardial border definition.  Left ventricular function is decreased. The ejection fraction is 40-45%  (mildly reduced).  Right ventricular function cannot be assessed due to poor image quality.  IVC diameter >2.1 cm collapsing <50% with sniff suggests a high RA pressure  estimated at 15 mmHg or greater.     This study was compared with the study from 6/14/2022. No significant changes  noted.    Recent Results (from the past 24 hour(s))   CT Chest w/o Contrast    Narrative    CT chest without contrast    INDICATION: Heart failure    COMPARISON: No prior CT scans of the chest.    FINDINGS: No contrast. Aorta normal size. Main pulmonary artery mildly  enlarged at 4 cm. Heart is enlarged. No pleural or pericardial  effusion. Upper abdomen is sonographic scattered by extensive  artifact. Degenerative changes in the thoracic spine. No effusions. No  definite enlarged lymph nodes in the chest.  Bone detail shows osteopenia. Degenerative changes in the mid to lower  thoracic spine.  The lungs show few patchy densities which may indicate edema in the  lungs. There is mild 1 basal septal thickening bilaterally. No  dominant nodule more focal consolidation.      Impression    IMPRESSION: Pulmonary artery enlargement suggesting pulmonary artery  hypertension. Pulmonary edema with cardiomegaly. Osteopenia.    OLLIE DAVALOS MD         SYSTEM ID:  [id number]       Medical Decision Making   30 MINUTES SPENT BY ME on the date of service doing chart  review, history, exam, documentation & further activities per the note.      Attending Attestation: I have personally seen and evaluated this patient as part of a shared APRN/PA visit with Angela Keating. I personally obtained and reviewed the history, exam, and made the pertinent medical decisions which are accurately recorded. My key management decisions carried out under my direction include to optimize RVR suppression and the choice of diuretics.  My additional findings, if any, have been incorporated into the body of the note. All labs, imaging studies, ECG and telemetry data have been reviewed. The assessment and plan outlined reflect our joint decision and include my key treatment recommendations and/or coordination of care that I summarized and shared with the patient.

## 2023-01-26 NOTE — PLAN OF CARE
Neuro: A&Ox4. Very lethargic.   Cardiac: Afib, rates 100-130s. VSS.   Respiratory: Sating >92 on 2L NC, BiPAP @ HS.  GI/: Adequate urine output through phan. BM X1  Diet/appetite: Tolerating 2g Na diet w 2L FR. Eating well, slept through dinner.  Activity:  Assist of 2 w lift for repos, not OOB this shift.  Pain: At acceptable level on current regimen. PRN percocet given x1.  Skin: No new deficits noted.  LDA's: R PICC, L PIV. bumex gtt @ 12mL/hr.     Plan: Continue with POC. Notify primary team with changes.

## 2023-01-26 NOTE — PLAN OF CARE
Goal Outcome Evaluation:  Neuro: A&Ox 4.  Calls as needed. Very lethargic today   Cardiac: A fib RVR in 110s, BP soft goal of MAP >55  Respiratory: Sating 92%> RA  GI/: Good urine output from phan, no BM this shift  Diet/appetite: healthy PO intake.   Activity:  turned w/ 2, took lyft to get oob  Pain: At acceptable level on current regimen.   Skin: No new deficits noted.  LDA's: PICC placed, L PIV intact.      Plan: Bumex drip increased from 8ml-12ml. Starts metolazone, diamox and metoprolol. CT done. No other concerns.

## 2023-01-26 NOTE — PLAN OF CARE
Neuro: A&Ox4. Lethargic.  Cardiac: Afebrile, VSS. Pt in a-fib (90s-100s), on eliquis.   Respiratory: RA, lung sounds clear, c/o SOB with exertion and at rest  GI/: Voiding spontaneously with phan. No BM this shift.  Diet/appetite: Tolerating 2g Na/2L FR. Denies nausea. BG checks ACHS + carb coverage  Activity: Up with lift assist. Q2 turns  Pain: C/o generalized pain 8/10, pt gets Q8 percocet   Skin: No new deficits noted.  Lines: R TL PICC with Bumex drip running @ 12mL/hr  Drains: Phan  Replacements: none    Plan: Continue with current POC. Report changes to primary team.

## 2023-01-27 NOTE — PLAN OF CARE
Neuro: A&Ox4. Very lethargic.   Cardiac: Afib, rates 100-130s. VSS.    Respiratory: Sating >92 on 2L NC, BiPAP @ HS.  GI/: Adequate urine output through phan. BM X1  Diet/appetite: Tolerating 2g Na diet w 2L FR. Eating well, slept through dinner.  Activity:  Assist of 2 w lift for repos, not OOB this shift.  Pain: At acceptable level on current regimen. PRN percocet given x1.  Skin: No new deficits noted.  LDA's: R PICC, L PIV. bumex gtt @ 12mL/hr.      Plan: Continue with POC. Notify primary team with changes.  Care given [phone]

## 2023-01-27 NOTE — PROGRESS NOTES
Vascular Surgery Attending Progress Note    The patient underwent right lower extremity arterial duplex examination this afternoon. This showed patent vessels with normal waveforms to the foot. There is no indication for vascular intervention, and toe pressures suggest that distal perfusion is adequate to support healing amputations at the level of the toes and forefoot. Recommend requesting a consult from the orthopedic foot and ankle service for further treatment. Thank you for the consult.    Magan Torres MD

## 2023-01-27 NOTE — PROGRESS NOTES
[facility]   Cardiology   Progress Note     ASSESSMENT/PLAN:  Pricilla Brown is a 47 year old year old female with PMH of  HFmrEF (EF 40-45) 2/2 NICM, long-standing persistent AF, HTN, type II DM, morbid obesity, asthma, JYOTI and Graves disease s/p radioiodine ablation c/b hypothyroid, who was admitted on 1/23/2023 for heart failure exacerbation      Today's Updates:  - Continue Bumex gtt  - Give Diamox 500 mg IV BID     # Acute on chronic systolic heart failure with mildly reduced EF (EF 40-45%)  # Long-standing persistent afib   # HTN  # Lactic acidosis 2/2 CHF exacerbation, resolved  # Troponin elevation 2/2 CHF  Pt admitted with c/o increasing SOB, MURRAY, chest tightness, weight gain in spite of compliance with PO diuretics. NT BNP 5891 (was 2030 during most recent admission).  Lactic acid 2.2 on admission with improvement to 1.1 with diuresis. Troponin mildly elevated, 67-->75-->60. 2/2 CHF exacerbation, no concern for ACS currently. Most recent Echo with EF 40-45%. Coronary angiogram 2012 with no significant CAD. Of note, pt recently admitted 12/25-1/1 HF exacerbation, diuresed to 472lbs. Pt weight on admission 1/23 555lbs.     - Volume Status: hypervolemic, continue Bumex gtt @3mg, continue Diamox 500mg IV BID, I/O -5L for past 24h  - Beta-blocker: Reduced PTA Metoprolol to 50 mg daily   - ACEi/ARB/ARNI: pending Cr, BP, will start Entresto (likely in coming days)  - Continue PTA hydral/isordil for now, plan to discontinue when Entresto started  - SGLT2i: not indicated in setting of reccurent yeast infections  - Rate control: BB as above, started PO Dilt 180 mg BID yesterday  -Anticoagulation: CHADSVASC 3, continue PTA apixaban  - Strict I/O, daily weights  - BID BMP while aggressively diuresing, with lyte replacement per procotol   - CORE consult  - Lymphedema consult      # Hypothyroidism  # Graves disease s/p radioiodine ablation c/b hypothyroid   TSH 20.4 and free T4 0.82 on  admission.   - Endocrine consulted, recs:   - Continue levothyroxine 200 mcg M-Sat- 400 mcg on Sunday.  Recheck TFT in a month    # Type II DM  # Morbid obesity  A1c 13.5 on 12/25/2022.   - Continue PTA Lantus 40 units daily  - Novolog mealtime insulin  1:8g CHO  - Continue high dose sliding scale   - POCT glucose checks  - Hypoglycemia protocol  - PTA Ozempic held while IP    # Right toe wound  Present on admission, right great toe and 3rd toe, dry gangrene, pt unaware how long wounds have been present.   - WOCN following: wound cares to right toe daily, Paint toes with betadine. Be sure to apply over dry eschar. Ok to leave open to air. No soaking feet.  - Vascular consult placed today    # Asthma  - Continue PTA inhalers (levalbuterol and spiriva)     # JYOTI  - Continue CPAP    FEN: 2 gm Sodium, 2L Fluid restriction  Code status: Full  Prophylaxis: PO Eliquis  Isolation: None  Disposition: possible discharge home 5-7 days pending stable volume status    Patient seen and discussed with Dr. Hillman, who agrees with above plan.    YOCASTA Buckley, CNP  [facility] Cardiology Team  Pager [phone]/[phone]    Interval History:   - No acute events overnight  -Remains on Bumex gtt, net negative 5L overnight  -Cr downtrending  -Pt drowsy this am, answered all orientation questions appropriately        Physical Exam:  Temp:  [97.5  F (36.4  C)-97.7  F (36.5  C)] 97.7  F (36.5  C)  Pulse:  [] 94  Resp:  [14-20] 16  BP: ()/() 152/109  SpO2:  [91 %-100 %] 91 %    I/O:    Intake/Output Summary (Last 24 hours) at 1/27/2023 0928  Last data filed at 1/27/2023 0833  Gross per 24 hour   Intake 1944.53 ml   Output 59269 ml   Net -8755.47 ml         Wt:   Wt Readings from Last 5 Encounters:   01/27/23 (!) 235 kg (518 lb)   01/01/23 (!) 214.4 kg (472 lb 9.6 oz)   10/31/22 (!) 201 kg (443 lb 3.2 oz)   10/26/22 (!) 208.6 kg (459 lb 14.4 oz)   06/21/22 (!) 191 kg (421 lb)         General: NAD  HEENT:  DESTINY SETHI.   Neck:  JVD not appreciate.   CV: RRR. No murmur appreciated. No rubs or gallops. Peripheral radial pulse intact.  Resp: No increased work of breathing or use of accessory muscles, breathing comfortably on room air.  Lung sounds clear throughout/bilaterally  Abdomen:  Normal active bowel sounds.  Abdomen is soft. No distension, non-tender to palpation.    Extremities: Warm. Capillary refill less than 3 sec. 2/4 radial pulses bilaterally.  1/4 pedal pulses bilaterally. No pre-tibial edema. No cyanosis or clubbing.  Skin:  Warm and dry. No erythema, rashes, ulceration or diaphoresis.  Neuro: Drowsy, oriented x3.      Medications:    acetaZOLAMIDE  500 mg Intravenous Q12H     apixaban ANTICOAGULANT  5 mg Oral BID     atorvastatin  40 mg Oral Daily     diltiazem ER COATED BEADS  180 mg Oral BID     gabapentin  600 mg Oral TID     heparin lock flush  5-20 mL Intracatheter Q24H     hydrALAZINE  12.5 mg Oral TID     insulin aspart   Subcutaneous TID w/meals     insulin aspart  1-10 Units Subcutaneous TID AC     insulin aspart  1-7 Units Subcutaneous At Bedtime     insulin glargine  40 Units Subcutaneous At Bedtime     isosorbide dinitrate  20 mg Oral TID     ketorolac  1 drop Right Eye TID     levothyroxine  200 mcg Oral Once per day on Mon Tue Wed Thu Fri Sat     [START ON 1/29/2023] levothyroxine  400 mcg Oral Weekly     metoprolol succinate ER  50 mg Oral Daily     miconazole   Topical BID     nicotine  1 patch Transdermal Daily    And     nicotine   Transdermal Q8H OPHELIA     nystatin   Topical BID     sennosides  8.6 mg Oral BID     sodium chloride (PF)  10-40 mL Intracatheter Q8H     sodium chloride (PF)  10-40 mL Intracatheter Q8H     spironolactone  50 mg Oral Daily     terbinafine   Topical Daily     topiramate  75 mg Oral At Bedtime     umeclidinium  1 puff Inhalation Daily       bumetanide 3 mg/hr (01/27/23 0702)     - MEDICATION INSTRUCTIONS -       ACE/ARB/ARNI NOT PRESCRIBED         Labs:   [facility]  Recent Labs   Lab  01/27/23  0502 01/26/23  2228 01/26/23  1705 01/26/23  1530 01/26/23  0824 01/26/23  0502 01/25/23  1914 01/25/23  1811 01/25/23  1253 01/25/23  0913 01/25/23  0755 01/25/23  0622 01/24/23  0801 01/24/23  0650 01/23/23  2231 01/23/23  1537     --   --  136  --  140  --  135*  --  135*  --  136   < > 139  139  --  135*   POTASSIUM 4.0  --   --  4.3  --  4.4  --  4.6  --  4.9  --  5.0   < > 4.7  4.7  --  5.0   CHLORIDE 94*  --   --  95*  --  101  --  95*  --  96*  --  98   < > 101  101  --  99   CO2 32*  --   --  29  --  32*  --  30*  --  28  --  26   < > 25  25  --  21*   ANIONGAP 11  --   --  12  --  7  --  10  --  11  --  12   < > 13  13  --  15   * 199* 140* 140*   < > 102*   < > 204*   < > 212*   < > 179*   < > 120*  120*   < > 346*   BUN 60.6*  --   --  63.1*  --  61.7*  --  60.3*  --  59.1*  --  60.1*   < > 55.7*  55.7*  --  56.2*   CR 1.47*  --   --  1.49*  --  1.56*  --  1.51*  --  1.52*  --  1.50*   < > 1.62*  1.62*  --  1.56*   GFRESTIMATED 44*  --   --  43*  --  41*  --  42*  --  42*  --  43*   < > 39*  39*  --  41*   JUSTIN 9.2  --   --  9.0  --  8.6  --  9.0  --  8.9  --  9.1   < > 9.0  9.0  --  9.1   MAG 2.1  --   --   --   --  2.1  --   --   --  2.0  --  2.2   < > 1.5*  --  1.6*   PROTTOTAL  --   --   --   --   --   --   --   --   --   --   --   --   --  6.3*  --  6.8   ALBUMIN  --   --   --   --   --   --   --   --   --   --   --   --   --  2.7*  --  3.0*   BILITOTAL  --   --   --   --   --   --   --   --   --   --   --   --   --  1.4*  --  1.6*   ALKPHOS  --   --   --   --   --   --   --   --   --   --   --   --   --  106*  --  120*   AST  --   --   --   --   --   --   --   --   --   --   --   --   --  23  --  23   ALT  --   --   --   --   --   --   --   --   --   --   --   --   --  11  --  16    < > = values in this interval not displayed.     CBC  Recent Labs   Lab 01/27/23  0502 01/26/23  0502 01/25/23  0622 01/24/23  0650   WBC 8.5 6.6 8.2 8.5   RBC 5.05 5.09 5.11 5.03   HGB  14.9 15.1 15.2 15.0   HCT 50.5* 52.2* 50.9* 48.9*    103* 100 97   MCH 29.5 29.7 29.7 29.8   MCHC 29.5* 28.9* 29.9* 30.7*   RDW 18.4* 18.6* 19.2* 18.8*    240 249 242     INR  Recent Labs   Lab 01/25/23  0622 01/24/23  0650   INR 1.44* 1.57*     Diagnostics:  ECG 1/23/23 AFib,      Echo 1/24/2023  Interpretation Summary  Technically difficult study.Extremely difficult acoustic windows despite the  use of contrast for endcardial border definition.  Left ventricular function is decreased. The ejection fraction is 40-45%  (mildly reduced).  Right ventricular function cannot be assessed due to poor image quality.  IVC diameter >2.1 cm collapsing <50% with sniff suggests a high RA pressure  estimated at 15 mmHg or greater.     This study was compared with the study from 6/14/2022. No significant changes  noted.         Recent Results (from the past 24 hour(s))   CT Chest w/o Contrast     Narrative     CT chest without contrast     INDICATION: Heart failure     COMPARISON: No prior CT scans of the chest.     FINDINGS: No contrast. Aorta normal size. Main pulmonary artery mildly  enlarged at 4 cm. Heart is enlarged. No pleural or pericardial  effusion. Upper abdomen is sonographic scattered by extensive  artifact. Degenerative changes in the thoracic spine. No effusions. No  definite enlarged lymph nodes in the chest.  Bone detail shows osteopenia. Degenerative changes in the mid to lower  thoracic spine.  The lungs show few patchy densities which may indicate edema in the  lungs. There is mild 1 basal septal thickening bilaterally. No  dominant nodule more focal consolidation.        Impression     IMPRESSION: Pulmonary artery enlargement suggesting pulmonary artery  hypertension. Pulmonary edema with cardiomegaly. Osteopenia.     OLLIE DAVALOS MD         SYSTEM ID:  [id number]          No results found for this or any previous visit (from the past 24 hour(s)).    Medical Decision Making       34  MINUTES SPENT BY ME on the date of service doing chart review, history, exam, documentation & further activities per the note.      Attending Attestation: I have personally seen and evaluated this patient as part of a shared APRN/PA visit with Sheree Betts. I personally obtained and reviewed the history, exam, and made the pertinent medical decisions which are accurately recorded. My key management decisions carried out under my direction include continued high dose iv diuresis. My additional findings, if any, have been incorporated into the body of the note. All labs, imaging studies, ECG and telemetry data have been reviewed. The assessment and plan outlined reflect our joint decision and include my key treatment recommendations and/or coordination of care that I summarized and shared with the patient.  - Overall making good progress with diuresis   -This morning patient responding sluggishly but oriented x3 (reassess during the day)

## 2023-01-27 NOTE — PROGRESS NOTES
ENDOCRINE BRIEF NOTE    *Pt is not being seen physically  Reviewed vitals and labs.   BG at goal    1.  Primary hypothyroidism -uncontrolled due to none adherence  In the setting of post radioablation in 2013 for Graves' disease     Recommendation:  Continue levothyroxine 200 mcg M-Sat- 400 mcg on Sunday.  Recheck TFT in 1-2 months       2.  Uncontrolled type 2 diabetes  Patient has big discrepancy of her insulin regimen at home and in the hospital, likely due to different meal intake.  During her last hospitalization 1 month ago, while she was in the hospital, she was given lantus 40 units daily, novolog meal time insulin 1:8 g CHO, and novolog 2/30 sliding scale TID AC and HS.  Her home meds (prior to the last hospitalization) were toujeo 225 units daily, novolog 100 units with each meal + CS, and ozempic 1 mg weekly   She could not tolerate empaglifozin due to frequent UTIs and metformin due to GI side effects.     Recommendation:  Continue Lantus to 40 units daily   Continue NovoLog mealtime insulin 1:8 g CHO  Continue aspart HIRD  Hypoglycemia protocol  Check BG 3 times daily AC, at bedtime, 2 AM  Home Ozempic is on hold. Can be restarted when she is discharged.     On discharge patient will need close follow-up with endocrine for her thyroid and diabetes.     Endocrine team will sign off. Please reconsult if pt condition for any questions or pt deteriorate     Discussed with attending  Vladislav Disla MD  Endocrine Fellow  Pager # [phone]

## 2023-01-27 NOTE — PLAN OF CARE
Neuro: A&Ox4. Sleeping between cares. Lethargic. Arouses to touch (hard to arouse when sleeping).  Cardiac: Afib 90s. VSS. SBP: 110s-130s. Afebrile.    Respiratory: Sating >95% on 30% BiPaP overnight. Sating >94% on RA when awake. .  GI/: Adequate urine output with phan (with dieretics). No BM overnight.   Diet/appetite: Tolerating 2g Na diet and 2L fluid estriction. Eating well.  Activity:  Assist of x3 woith lift. Not OOB  Pain: At acceptable level on current regimen. Pt was offered medications, but refused and went back to sleep.    Skin: No new deficits noted.  LDA's: R-PICC x3 lumen (bumex 12ml/hr), PIV x1 (SL). Phan.     Plan: Continue with POC. Notify primary team with changes.

## 2023-01-27 NOTE — PROGRESS NOTES
Called to room by RN for increased lethargy. Pt arousable but not answering commands. Rapid response RN also at bedside. VBG drawn, pt placed back on BiPAP.  CO2 elevated on VBG, updated RN to keep pt on BiPAP for several hours and plan a recheck VBG @ 1630.

## 2023-01-27 NOTE — PROVIDER NOTIFICATION
NURSING PROGRESS NOTE  Provider Notification      Cards 1 pager notified via [employer] Smart Web Paging on January 27, 2023 at 1131.    Critical lab notification?:  No      Reason for notification:  [id number], Kevin, Pt states that she feels like she is shaking d/t Diuretic therapy. She is also c/o pain with movement. Her LOC has not changed. Is rousable, but drowsy. Mumbled speech. [place] [phone]       Response/Follow-up:  Awaiting response and will follow up accordingly.        Stephen Fry RN  .................................................... January 27, 2023   11:31 AM  [facility] ([facility]): [facility] ICU (Unit [address]D)

## 2023-01-27 NOTE — CONSULTS
VASCULAR SURGERY INPATIENT CONSULTATION / Initial In-Patient visit    VASCULAR SURGEON: Dr. 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     Vascular surgery was consulted for necrotic toes 23. ABIs on 23  Right:    Arm: 88 mmHg   PT at ankle: 108 mmHg   DP at foot: 116 mmHg   Toe pressure 133 mmHg      JOSETTE: 1.20   TBI: 1.37     Left:   Arm: 97 mmHg   PT at ankle: 99 mmHg   DP at foot: 114 mmHg   Toe pressure 123 mmHg      JOSETTE: 1.18   TBI: 1.27                                               Impression:   1. Right lea. Resting JOSETTE is normal, 1.20.  1b. TBI is normal, 1.37.     2. Left lea. Resting JOSETTE is normal, 1.18.  2b. TBI is normal, 1.27.    On exam, patient is resting in bed, sleepy. Opens eyes to command and noted she has pain on her toe. Minimally interactive. Faint popliteal pulse identified, under calf and knee folds and weak femoral pulse. Large pannus. Unable to identify DPs and PTs bilaterally. Feet warm. Discussion and exam by RN without successful identification of PT and DP doppler pulses despite multiple attempts. Well demarcated dark toes noted, see pictures.               RECOMMENDATION:     - Repeat US ABIs on right  - Continue Atorvastatin and anticoagulation as you are.   - Vascular Surgery will follow      PHH:    Past Medical History:   Diagnosis Date     A-fib (H)     on coumadin since      Asthma     as a kid     Chest pain 2017     Chronic anticoagulation  for a-fib 2/15/2013    INR's followed by coumadin clinic at      Diabetes mellitus (H) 2012     Diastolic heart failure 2/15/2013     Dilated cardiomyopathy (H) 1/8/2013     HTN (hypertension)      Hyperthyroidism     Graves, s/p I131 1/13, now on prednisone and methimazole     Mixed type age-related cataract, both eyes 4/8/2022     Morbid obesity (H)      Pulmonary embolism (H) 1/12    hospitalized in [place], on lovenox/coumadin for a few months but stopped, hypercoag w/u neg per pt     Sleep apnea     using CPAP         Past Surgical History:   Procedure Laterality Date     PICC INSERTION - TRIPLE LUMEN Right 01/24/2023    right cephalic 5 fr tl picc 48 cm        ALLERGIES:     Allergies   Allergen Reactions     Penicillins Other (See Comments) and Unknown     CHILDHOOD ALLERGY  CHILDHOOD ALLERGY     Ibuprofen Hives and Rash     Ibuprofen Sodium Hives and Rash        MEDS:    Current Facility-Administered Medications:      acetaminophen (TYLENOL) tablet 650 mg, 650 mg, Oral, Q6H PRN, Chantal Peterson, CNP, 650 mg at 01/26/23 1356     acetaZOLAMIDE (DIAMOX) injection 500 mg, 500 mg, Intravenous, Q12H, Sheree Betts, APRN CNP, 500 mg at 01/27/23 1010     albuterol (PROVENTIL HFA/VENTOLIN HFA) inhaler, 2 puff, Inhalation, Q6H PRN, Nina Lundy MD     albuterol (PROVENTIL) neb solution 2.5 mg, 2.5 mg, Nebulization, Q6H PRN, Nina Lundy MD, 2.5 mg at 01/23/23 1956     apixaban ANTICOAGULANT (ELIQUIS) tablet 5 mg, 5 mg, Oral, BID, Dorene Grier MD, 5 mg at 01/27/23 0912     atorvastatin (LIPITOR) tablet 40 mg, 40 mg, Oral, Daily, Nina Lundy MD, 40 mg at 01/27/23 0912     bisacodyl (DULCOLAX) suppository 10 mg, 10 mg, Rectal, Daily PRN, Nina Lundy MD     bumetanide (BUMEX) 0.25 mg/mL infusion, 3 mg/hr, Intravenous, Continuous, Chantal Peterson, CNP, Last Rate: 12 mL/hr at 01/27/23 0702, 3 mg/hr at 01/27/23 0702     clotrimazole (LOTRIMIN) 1 % cream, ,  Topical, BID PRN, Nina Lundy MD     cyclobenzaprine (FLEXERIL) tablet 10 mg, 10 mg, Oral, Q8H PRN, Chantal Peterson CNP, 10 mg at 01/27/23 0914     glucose gel 15-30 g, 15-30 g, Oral, Q15 Min PRN **OR** dextrose 50 % injection 25-50 mL, 25-50 mL, Intravenous, Q15 Min PRN **OR** glucagon injection 1 mg, 1 mg, Subcutaneous, Q15 Min PRN, Nina Lundy MD     diltiazem ER COATED BEADS (CARDIZEM CD/CARTIA XT) 24 hr capsule 180 mg, 180 mg, Oral, BID, Chantal Peterson CNP, 180 mg at 01/27/23 0912     gabapentin (NEURONTIN) capsule 600 mg, 600 mg, Oral, TID, Nina Lundy MD, 600 mg at 01/27/23 0911     heparin lock flush 10 UNIT/ML injection 5-20 mL, 5-20 mL, Intracatheter, Q24H, Nina Lundy MD, 5 mL at 01/26/23 1739     heparin lock flush 10 UNIT/ML injection 5-20 mL, 5-20 mL, Intracatheter, Q1H PRN, Nina Lundy MD, 5 mL at 01/27/23 0457     hydrALAZINE (APRESOLINE) half-tab 12.5 mg, 12.5 mg, Oral, TID, Chantal Peterson CNP, 12.5 mg at 01/27/23 0911     hydrOXYzine (ATARAX) tablet 25 mg, 25 mg, Oral, Q6H PRN, 25 mg at 01/26/23 1557 **OR** hydrOXYzine (ATARAX) tablet 50 mg, 50 mg, Oral, Q6H PRN, Chantal Peterson CNP     insulin aspart (NovoLOG) injection (RAPID ACTING), , Subcutaneous, TID w/meals, Vladislav Disla MD, Given at 01/27/23 0922     insulin aspart (NovoLOG) injection (RAPID ACTING), , Subcutaneous, With Snacks or Supplements, Vladislav Disla MD     insulin aspart (NovoLOG) injection (RAPID ACTING), 1-10 Units, Subcutaneous, TID AC, Nina Lundy MD, 4 Units at 01/27/23 0906     insulin aspart (NovoLOG) injection (RAPID ACTING), 1-7 Units, Subcutaneous, At Bedtime, Nina Lundy MD, 1 Units at 01/25/23 2223     insulin glargine (LANTUS PEN) injection 40 Units, 40 Units, Subcutaneous, At Bedtime, Vladislav Disla MD, 40 Units at 01/26/23 2239     isosorbide dinitrate (ISORDIL) tablet 20 mg, 20 mg, Oral, TID, Nikkie,  MD Nina, 20 mg at 01/27/23 0912     ketorolac (ACULAR) 0.5 % ophthalmic solution 1 drop, 1 drop, Right Eye, TID, Nina Lundy MD, 1 drop at 01/27/23 0920     levothyroxine (SYNTHROID/LEVOTHROID) tablet 200 mcg, 200 mcg, Oral, Once per day on Mon Tue Wed Thu Fri Sat, Cruz Aguirre MD, 200 mcg at 01/27/23 0910     [START ON 1/29/2023] levothyroxine (SYNTHROID/LEVOTHROID) tablet 400 mcg, 400 mcg, Oral, Weekly, Cruz Aguirre MD     metoprolol succinate ER (TOPROL XL) 24 hr tablet 50 mg, 50 mg, Oral, Daily, Chantal Peterson CNP, 50 mg at 01/27/23 1010     miconazole (MICATIN) 2 % cream, , Topical, BID, Nina Lundy MD, Given at 01/27/23 0927     nicotine (NICODERM CQ) 14 MG/24HR 24 hr patch 1 patch, 1 patch, Transdermal, Daily, 1 patch at 01/26/23 2240 **AND** nicotine Patch in Place, , Transdermal, Q8H OPHELIA, Nina Lundy MD     nystatin (MYCOSTATIN) cream, , Topical, BID, Nina Lundy MD, Given at 01/27/23 0930     ondansetron (ZOFRAN ODT) ODT tab 4 mg, 4 mg, Oral, Q6H PRN, Nina Lundy MD, 4 mg at 01/25/23 1255     oxyCODONE-acetaminophen (PERCOCET) 5-325 MG per tablet 1 tablet, 1 tablet, Oral, Q8H PRN, Nina Lundy MD, 1 tablet at 01/27/23 0913     Patient is already receiving anticoagulation with heparin, enoxaparin (LOVENOX), warfarin (COUMADIN)  or other anticoagulant medication, , Does not apply, Continuous PRN, Nina Lundy MD     Reason ACE/ARB/ARNI order not selected, , Other, DOES NOT GO TO MAR, Nina Lundy MD     sennosides (SENOKOT) tablet 8.6 mg, 8.6 mg, Oral, BID, Marvel, Dorene Novoa MD, 8.6 mg at 01/27/23 0912     simethicone (MYLICON) chewable tablet 80 mg, 80 mg, Oral, Q6H PRN, Chantal Peterson CNP, 80 mg at 01/26/23 1740     sodium chloride (PF) 0.9% PF flush 10-20 mL, 10-20 mL, Intracatheter, q1 min prn, Nina Lundy MD, 10 mL at 01/25/23 1809     sodium chloride (PF) 0.9% PF flush 10-20 mL, 10-20 mL,  Intracatheter, q1 min prn, Nina Lundy MD, 10 mL at 01/27/23 0458     sodium chloride (PF) 0.9% PF flush 10-40 mL, 10-40 mL, Intracatheter, Q8H, Nina Lundy MD, 10 mL at 01/27/23 1023     sodium chloride (PF) 0.9% PF flush 10-40 mL, 10-40 mL, Intracatheter, Q8H, Nina Lundy MD, 10 mL at 01/27/23 1023     spironolactone (ALDACTONE) tablet 50 mg, 50 mg, Oral, Daily, Nina Lundy MD, 50 mg at 01/27/23 0910     terbinafine (lamISIL) 1 % cream, , Topical, Daily, Nina Lundy MD, Given at 01/27/23 0930     topiramate (TOPAMAX) tablet 75 mg, 75 mg, Oral, At Bedtime, Nina Lundy MD, 75 mg at 01/26/23 2239     umeclidinium (INCRUSE ELLIPTA) 62.5 MCG/ACT inhaler 1 puff, 1 puff, Inhalation, Daily, MarvelDorene swanson MD, 1 puff at 01/27/23 0907    Facility-Administered Medications Ordered in Other Encounters:      sodium chloride (PF) 0.9% PF flush 10 mL, 10 mL, Intravenous, Once, CanNii MD     SOCIAL HABITS:    Tobacco Use      Smoking status: Light Smoker        Packs/day: 0.25        Years: 13.00        Pack years: 3.25        Types: Cigarettes      Smokeless tobacco: Never      Tobacco comments: down to 2 cigs a day     Social History    Substance and Sexual Activity      Alcohol use: No       History   Drug Use No        FAMILY HISTORY:    Family History   Problem Relation Age of Onset     Thyroid Disease Mother         Grave's D     Diabetes Mother      Heart Disease Mother      Cerebrovascular Disease Mother         dec. 54 yo     Hypertension Mother      Heart Disease Sister         Heart Murmur     Diabetes Sister      Heart Disease Father         dec in his 30s, MI     Psychotic Disorder Brother         Bipolar     Diabetes Brother      Thyroid Disease Brother         Hyper Thyroid     Heart Disease Brother      Thyroid Disease Sister         Hyper Thyroid     Thyroid Disease Sister         Hyper Thyroid     Thyroid Disease Sister          "Mental Health Problems     Thyroid Disease Maternal Aunt      Thyroid Disease Maternal Uncle      Cancer No family hx of      Glaucoma No family hx of      Macular Degeneration No family hx of        REVIEW OF SYSTEMS:    A 12 point ROS was reviewed and except for what is listed in the HPI above, all others are negative    PE:    Vital signs:  Temp: 97.7  F (36.5  C) Temp src: Axillary BP: (!) 152/109 Pulse: 94   Resp: 16 SpO2: 94 % O2 Device: None (Room air) Oxygen Delivery: 3 LPM Height: 170.2 cm (5' 7\") Weight: (!) 235 kg (518 lb)  Estimated body mass index is 81.13 kg/m  as calculated from the following:    Height as of this encounter: 1.702 m (5' 7\").    Weight as of this encounter: 235 kg (518 lb).       Wt Readings from Last 1 Encounters:   01/27/23 (!) 235 kg (518 lb)     Body mass index is 81.13 kg/m .    EXAM:  GENERAL: This is a morbidly obese 47 year old female who appears her stated age.   CARDIAC:  Normal S1 and S2, no Murmur  CHEST/LUNG:  Not Assessed  GASTROINTESINAL (ABDOMEN): limited exam, morbidly obese, abdomen Soft, non-tender, B/S present  MUSCULOSKELETAL: Grossly normal and both lower extremities are intact.  HEME/LYMPH: No lymphedema  NEUROLOGIC: Focally intact  PSYCH: appropriate affect  INTEGUMENT: No open lesions or ulcers  VASCULAR: faint palpable femoral pulses, weak doppler signal right popliteal pulse and unable to identify doppler PT/DP on right       DIAGNOSTIC STUDIES:     Images:  US JOSETTE Doppler No Exercise 1-2 Levels Right    Result Date: 1/24/2023  Exam: Bilateral lower extremity resting ankle brachial indices dated 1/24/2023 10:28 AM Comparison study: None Clinical history: Dark right toe Ordering provider: Nina Lundy MD Technique: Bilateral lower extremity resting ankle brachial indices obtained. Findings: Right:  Arm: 88 mmHg  PT at ankle: 108 mmHg  DP at foot: 116 mmHg  Toe pressure 133 mmHg  JOSETTE: 1.20  TBI: 1.37 Left:  Arm: 97 mmHg  PT at ankle: 99 mmHg  DP at " foot: 114 mmHg  Toe pressure 123 mmHg  JOSETTE: 1.18  TBI: 1.27     Impression: 1. Right lea. Resting JOSETTE is normal, 1.20. 1b. TBI is normal, 1.37. 2. Left lea. Resting JOSETTE is normal, 1.18. 2b. TBI is normal, 1.27. Guidelines: JOSETTE Diagnostic Criteria (Based on criteria published in Circulation 2011; 124: 1046-2740):   > 1.4: Non compressible   1.00 - 1.40: Normal   0.91 - 0.99: Borderline   At or below 0.90: Abnormal JOSETTE Diagnostic Criteria (Based on ACC/AHA guideline 2008):   >/=1.3 - non compressible vessels   1.00  -1.29 - Normal   0.91 - 0.99 - Borderline   0.41 - 0.90 - Mild to moderate PAD   0.00 - 0.40 - Severe PAD I have personally reviewed the examination and initial interpretation and I agree with the findings. CARLENE HANSEN MD   SYSTEM ID:  [id number]      LABS:      Sodium   Date Value Ref Range Status   2023 137 136 - 145 mmol/L Final   2023 136 136 - 145 mmol/L Final   2023 140 136 - 145 mmol/L Final   2021 130 (L) 133 - 144 mmol/L Final   2020 128 (L) 133 - 144 mmol/L Final   10/29/2019 138 133 - 144 mmol/L Final     Urea Nitrogen   Date Value Ref Range Status   2023 60.6 (H) 6.0 - 20.0 mg/dL Final   2023 63.1 (H) 6.0 - 20.0 mg/dL Final   2023 61.7 (H) 6.0 - 20.0 mg/dL Final   06/15/2022 17 7 - 30 mg/dL Final   10/20/2021 14 7 - 30 mg/dL Final   2021 18 7 - 30 mg/dL Final   2021 13 7 - 30 mg/dL Final   2020 14 7 - 30 mg/dL Final   10/29/2019 14 7 - 30 mg/dL Final     Hemoglobin   Date Value Ref Range Status   2023 14.9 11.7 - 15.7 g/dL Final   2023 15.1 11.7 - 15.7 g/dL Final   2023 15.2 11.7 - 15.7 g/dL Final   2021 16.7 (H) 11.7 - 15.7 g/dL Final   2020 15.6 11.7 - 15.7 g/dL Final   10/29/2019 14.1 11.7 - 15.7 g/dL Final     Platelet Count   Date Value Ref Range Status   2023 221 150 - 450 10e3/uL Final   2023 240 150 - 450 10e3/uL Final   2023 249 150 - 450 10e3/uL Final    06/03/2021 238 150 - 450 10e9/L Final   08/19/2020 243 150 - 450 10e9/L Final   10/29/2019 284 150 - 450 10e9/L Final     INR   Date Value Ref Range Status   01/25/2023 1.44 (H) 0.85 - 1.15 Final   01/24/2023 1.57 (H) 0.85 - 1.15 Final   12/25/2022 1.36 (H) 0.85 - 1.15 Final   08/19/2020 0.99 0.86 - 1.14 Final   10/29/2019 1.94 (H) 0.86 - 1.14 Final   07/23/2019 3.49 (H) 0.86 - 1.14 Final       Total time spent 30 minutes face to face with patient with more than 50% time spent in counseling and coordination of care.    Layla Addison, CNP  Vascular Surgery  Pager: [phone]  [email or web address]  Send message or 10 digit call back number Securely via [employer] with the [employer] Web Console (learn more here)

## 2023-01-28 NOTE — CONSULTS
[facility]  Orthopedic Surgery Consult    Name: Pricilla Bronw  Age: 47 year old  MRN: [id number]  YOB: 1975    Reason for Consult: Right great toe dry gangrene     Requesting Provider: Dr. Auguste    Assessment and Plan:     Assessment:  48 y/o woman with multiple medical comorbidities and ~ 3 weeks of dusky appearing Right 1st, 3rd, and 4th great toes consistent with dry gangrene. No concern for infection on exam.     Plan:  - No plan for orthopaedic surgery intervention during this admission  - Xrays of Right foot when clinically able-ordered  - Given benign appearance on exam with no concern for infection, patient can follow up outpatient to discuss elective amputation.  Referral with clinical schedulers placed to help schedule follow up.  - No dressings needed  - Continue with extremity elevation to avoid pressure injury   - WBAT on bilateral lower extremities from an orthopaedic perspective  - Orthopaedics will follow peripherally. Please do not hesitate to reach out to myself or the orthopaedic surgery resident on call if there are any questions or concerns       Staff for this patient is attending Dr. Oh.    Shawn Degroot MD  Orthopedic Surgery PGY4  [phone]    Please page me directly with any questions/concerns during regular weekday hours before 5 pm. If there is no response, if it is a weekend, or if it is during evening hours then please page the orthopedic surgery resident on call.    History of Present Illness:     History and exam extremely limited due to patient somnolence and interaction. From chart review, this is a 47 year old year old female with PMH of HFmrEF persistent AF, HTN, type II DM, morbid obesity, asthma, JYOTI and Graves disease, who was admitted on 1/23/2023 for heart failure exacerbation. She was admitted to the ICU on 1/27 for airway management in setting of encephalopathy.     Per the patient, she noticed increased duskiness  of her great toe around 3 weeks ago. She was unable to answer any other questions provide further details.        Past Medical History:     Past Medical History:   Diagnosis Date     A-fib (H)     on coumadin since      Asthma     as a kid     Chest pain 2017     Chronic anticoagulation for a-fib 2/15/2013    INR's followed by coumadin clinic at      Diabetes mellitus (H)      Diastolic heart failure 2/15/2013     Dilated cardiomyopathy (H) 2013     HTN (hypertension)      Hyperthyroidism     Graves, s/p I131 , now on prednisone and methimazole     Mixed type age-related cataract, both eyes 2022     Morbid obesity (H)      Pulmonary embolism (H)     hospitalized in [place], on lovenox/coumadin for a few months but stopped, hypercoag w/u neg per pt     Sleep apnea     using CPAP       Past Surgical History:     Past Surgical History:   Procedure Laterality Date     PICC INSERTION - TRIPLE LUMEN Right 2023    right cephalic 5 fr tl picc 48 cm       Social History:     Social History     Socioeconomic History     Marital status: Single     Spouse name: None     Number of children: None     Years of education: None     Highest education level: None   Tobacco Use     Smoking status: Light Smoker     Packs/day: 0.25     Years: 13.00     Pack years: 3.25     Types: Cigarettes     Smokeless tobacco: Never     Tobacco comments:     down to 2 cigs a day   Substance and Sexual Activity     Alcohol use: No     Drug use: No     Sexual activity: Yes     Partners: Male     Birth control/protection: Condom   Social History Narrative    Single, no children        Gyn:        Last pap several years ago, no abnormal    No STIs            Patient is single.  She is no longer working.  She used to work in the area of customer service.  She is currently living with her sister in [place], [place].  She has no pets.  Patient has smoked a half pack of cigarettes a day for the past 10 plus  years.  She states that she is down to 5 cigarettes a day with the aid of Wellbutrin.  She does not smoke cigars, pipes or chew tobacco.  She has 1 cup of coffee in the morning.  She does not drink alcohol.  Patient does not exercise.        Family History:     Family History   Problem Relation Age of Onset     Thyroid Disease Mother         Grave's D     Diabetes Mother      Heart Disease Mother      Cerebrovascular Disease Mother         dec. 56 yo     Hypertension Mother      Heart Disease Sister         Heart Murmur     Diabetes Sister      Heart Disease Father         dec in his 30s, MI     Psychotic Disorder Brother         Bipolar     Diabetes Brother      Thyroid Disease Brother         Hyper Thyroid     Heart Disease Brother      Thyroid Disease Sister         Hyper Thyroid     Thyroid Disease Sister         Hyper Thyroid     Thyroid Disease Sister         Mental Health Problems     Thyroid Disease Maternal Aunt      Thyroid Disease Maternal Uncle      Cancer No family hx of      Glaucoma No family hx of      Macular Degeneration No family hx of        Medications:     Current Facility-Administered Medications   Medication     acetaminophen (TYLENOL) tablet 650 mg     albuterol (PROVENTIL HFA/VENTOLIN HFA) inhaler     albuterol (PROVENTIL) neb solution 2.5 mg     apixaban ANTICOAGULANT (ELIQUIS) tablet 5 mg     atorvastatin (LIPITOR) tablet 40 mg     bisacodyl (DULCOLAX) suppository 10 mg     [Held by provider] bumetanide (BUMEX) 0.25 mg/mL infusion     clotrimazole (LOTRIMIN) 1 % cream     cyclobenzaprine (FLEXERIL) tablet 10 mg     dextrose 10% infusion     glucose gel 15-30 g    Or     dextrose 50 % injection 25-50 mL    Or     glucagon injection 1 mg     diclofenac (VOLTAREN) 1 % topical gel 2 g     diltiazem ER COATED BEADS (CARDIZEM CD/CARTIA XT) 24 hr capsule 180 mg     gabapentin (NEURONTIN) capsule 600 mg     heparin lock flush 10 UNIT/ML injection 5-20 mL     heparin lock flush 10 UNIT/ML  injection 5-20 mL     hydrALAZINE (APRESOLINE) half-tab 12.5 mg     hydrALAZINE (APRESOLINE) injection 10 mg     hydrOXYzine (ATARAX) tablet 25 mg    Or     hydrOXYzine (ATARAX) tablet 50 mg     [Held by provider] insulin aspart (NovoLOG) injection (RAPID ACTING)     insulin aspart (NovoLOG) injection (RAPID ACTING)     [Held by provider] insulin aspart (NovoLOG) injection (RAPID ACTING)     [Held by provider] insulin aspart (NovoLOG) injection (RAPID ACTING)     [Held by provider] insulin glargine (LANTUS PEN) injection 20 Units     isosorbide dinitrate (ISORDIL) tablet 20 mg     ketorolac (ACULAR) 0.5 % ophthalmic solution 1 drop     labetalol (NORMODYNE/TRANDATE) injection 10 mg     levothyroxine (SYNTHROID/LEVOTHROID) tablet 200 mcg     [START ON 1/29/2023] levothyroxine (SYNTHROID/LEVOTHROID) tablet 400 mcg     metoprolol succinate ER (TOPROL XL) 24 hr tablet 50 mg     miconazole (MICATIN) 2 % cream     naloxone (NARCAN) injection 0.2 mg    Or     naloxone (NARCAN) injection 0.4 mg    Or     naloxone (NARCAN) injection 0.2 mg    Or     naloxone (NARCAN) injection 0.4 mg     nicotine (NICODERM CQ) 14 MG/24HR 24 hr patch 1 patch    And     nicotine Patch in Place     nystatin (MYCOSTATIN) cream     ondansetron (ZOFRAN ODT) ODT tab 4 mg     oxyCODONE-acetaminophen (PERCOCET) 5-325 MG per tablet 1 tablet     Patient is already receiving anticoagulation with heparin, enoxaparin (LOVENOX), warfarin (COUMADIN)  or other anticoagulant medication     Reason ACE/ARB/ARNI order not selected     sennosides (SENOKOT) tablet 8.6 mg     simethicone (MYLICON) chewable tablet 80 mg     sodium chloride (PF) 0.9% PF flush 10-20 mL     sodium chloride (PF) 0.9% PF flush 10-20 mL     sodium chloride (PF) 0.9% PF flush 10-40 mL     sodium chloride (PF) 0.9% PF flush 10-40 mL     spironolactone (ALDACTONE) tablet 50 mg     terbinafine (lamISIL) 1 % cream     topiramate (TOPAMAX) tablet 75 mg     umeclidinium (INCRUSE ELLIPTA) 62.5  "MCG/ACT inhaler 1 puff     Facility-Administered Medications Ordered in Other Encounters   Medication     sodium chloride (PF) 0.9% PF flush 10 mL       Allergies:     Allergies   Allergen Reactions     Penicillins Other (See Comments) and Unknown     CHILDHOOD ALLERGY  CHILDHOOD ALLERGY     Ibuprofen Hives and Rash     Ibuprofen Sodium Hives and Rash       Review of Systems:     Unable to answer due to altered mental status      Physical Exam:     Vital Signs: BP 98/59   Pulse 106   Temp 97.7  F (36.5  C) (Oral)   Resp 19   Ht 1.702 m (5' 7\")   Wt (!) 217.7 kg (480 lb)   SpO2 98%   BMI 75.18 kg/m    General: breat  Cardiovascular: extremities are WWP  Respiratory: labored, on bipap   Neurologic: lethargic. Unable to answer questions  Musculoskeletal:    RLE:   First, 3rd, and 4th toe are dark in appearance compared to the 2nd and 5th digit.   No gross deformity.   Patient unable to follow directions or respond to appropriately assess motor/sensory function  Non-palpable DP/PT pulse. Foot is warm      Imaging:     Arterial duplex US: Adequate blood flow within the posterior tibial, anterior tibial, and dorsalis pedis arteries on the right lower exremity    Data:     CBC:  Lab Results   Component Value Date    WBC 8.0 01/28/2023    HGB 15.3 01/28/2023     01/28/2023     BMP:  Lab Results   Component Value Date     01/28/2023    POTASSIUM 3.6 01/28/2023    CHLORIDE 95 (L) 01/28/2023    CO2 40 (H) 01/28/2023    BUN 54.3 (H) 01/28/2023    CR 1.36 (H) 01/28/2023    ANIONGAP 8 01/28/2023    JUSTIN 9.3 01/28/2023     (H) 01/28/2023     Inflammatory Markers:  Lab Results   Component Value Date    WBC 8.0 01/28/2023    WBC 8.5 01/27/2023    WBC 6.6 01/26/2023    CRP 26.8 07/28/2021    CRP 30.0 (H) 06/03/2021    CRP 39.2 (H) 03/18/2019    SED 8 07/28/2021    SED 8 06/03/2021    SED 31 (H) 03/18/2019       "

## 2023-01-28 NOTE — PROGRESS NOTES
0530 update: MICU night resident called about need for D50 for low blood glucose for 2nd time this shift. Patient's alertness is labile from lethargic and responsive to obtunded. Patient seems less alert since 0200 assessment. RN voices concern about net -10L on 1/27. MD verbalized to continue Bumex gtt

## 2023-01-28 NOTE — PROGRESS NOTES
Bumex gtt was stopped at 08:30, due to significant urine output. Urine output from 00:00-08:05=0271ul.      13:30--Bumex gtt remains off and urine output since midnight is 10 liters. Pt is  currently net negative 10 liters for the day. Team is aware.    Low blood glucose since last night. PT required 2 amps D50 this shift. D10 gtt started at 10 ml/hr. 13:45 BG=73.     14:30--Spoke with MICU Team and informed them of the increasing urine output. Requested IV fluid replacement. MICU team placed another page to Cardiology and ordered another consult.     As of 15:45- Patient has voided 11,375 ml since midnight, and is net negative 11,090 ml for the day, since midnight.

## 2023-01-28 NOTE — PROVIDER NOTIFICATION
1930: Notified Cards about patients lethargy and inability to swallow meds due to being on AVAPS. Requested meds to be switched to IV if possible. Also requested additional VBG to assess current CO2 levels.    Additionally spoke to pharmacist about holding all oral meds due to lethargy and need to be on AVAPS.

## 2023-01-28 NOTE — PROGRESS NOTES
"Transfer  Transferred to: 4C  Via:bed  Reason for transfer:Pt no longer appropriate for 6B- worsened patient condition requiring intubation  Belongings: Packed and sent with pt  Chart: Delivered with pt to next unit  Medications: Meds sent to new unit with pt  Report given to: ABDIRIZAK Paz RN  Pt status: Lethargic, arouses to pain, does not follow commands. On CPAP/AVAPS 30%.  VSS.   /69 (BP Location: Other (Comment))   Pulse 84   Temp 98.3  F (36.8  C) (Axillary)   Resp 18   Ht 1.702 m (5' 7\")   Wt (!) 235 kg (518 lb)   SpO2 100%   BMI 81.13 kg/m        "

## 2023-01-28 NOTE — H&P
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    CHANGES and MAJOR THINGS TODAY:   - Transfer to ICU  - plan to remain on BiPAP overnight; pt alert and oriented; repeat VBG in AM  - Continue bumex gtt and acetazolamide for now    PLAN:    Neuro:    # Encephalopathy in setting of hypercarbia, acute on chronic, improving  Pt admitted 1/23 with no mention of encephalopathy concerns on admission note. Since, pt has undergone aggressive diuresis including Acetazolamide for augmentation of diuresis, which may be iatrogenically impacting pt's bicarb but may also have contraction alkalosis from diuresis. Pt's morbid obesity likely indicating pt is chronic CO2 retainer, however 1/23 admssion CO2 actually low at 21 (with lactic acidosis) and previously elevated to mid-30s during 12/2022 admission, likely chronic in setting of obesity and retaining. Pt's mental status has continued to be poor and CO2  has remained significantly elevated despite trial on NIPPV, thus requiring ICU transfer and though no intubation overnight. Current low suspicion for infectious etiology as pt has otherwise remained HDS/AF, lactate 1/25 WNL, WBC WNL.    - Diuresis per below   - Respiratory management per below   - low threshold to initiate infectious workup should pt fail to improve   - Pt currently A&Ox4   - repeat VBG in  AM    Pulmonary:    # Acute on chronic hypercarbic respiratory failure requiring BiPAP  Pt progressively lethargic/obtunded, started on BiPAP during daytime 1/27. Transferred to ICU for failure for mental status to improve, however arrived A&Ox4, tolerating BiPAP well. Likely a chronic retainer of CO2 in setting of morbid obesity, and pt's baseline CO2 likely chronically elevated    - BiPAP for now   - low threshold to intubate should pt's respiratory status decompensate   - repeat VBG in AM    # Asthma  - Continue PTA inhalers (levalbuterol and spiriva)     # JYOTI  - Resume nighttime CPAP once returns to baseline    Cardiovascular:  # Acute on chronic systolic heart failure with mildly reduced EF (EF 40-45%)  # Long-standing persistent afib   # HTN  # Lactic acidosis 2/2 CHF exacerbation, resolved  # Troponin elevation 2/2 CHF  Pt admitted with c/o increasing SOB, MURRAY, chest tightness, weight gain in spite of compliance with PO diuretics. NT BNP 5891 (was 2030 during most recent admission).  Lactic acid 2.2 on admission with improvement to 1.1 with diuresis. Troponin mildly elevated, 67-->75-->60. 2/2 CHF exacerbation, no concern for ACS currently. Most recent Echo with EF 40-45%. Coronary angiogram 2012 with no significant CAD. Of note, pt recently admitted 12/25-1/1 HF exacerbation, diuresed to 472lbs. Pt weight on admission 1/23 555lbs.      - Volume Status: hypervolemic, continue Bumex gtt @3mg for now; continue Acetazolamide BID for now   ---> consider relative contraction alkalosis (ie pt unable to equilibrate vascular space under the aggressive UOP rate currently) should pt's Bicarb continue to rise  - Beta-blocker: Reduced PTA Metoprolol to 50 mg daily   - ACEi/ARB/ARNI: pending Cr, BP, will start Entresto (likely in coming days)  - Continue PTA hydral/isordil for now, plan to discontinue when Entresto started  - SGLT2i: contraindicated in setting of reccurent yeast infections  - Rate control: BB as above,  started PO Dilt 180 mg BID 1/26  -Anticoagulation: CHADSVASC 3, continue PTA apixaban  - Strict I/O, daily weights  - BID BMP while aggressively diuresing, with lyte replacement per procotol   - CORE consult  - Lymphedema consult        GI/Nutrition:    # Morbid obesity   - Nutrition consulted for CHF 2g Na diet, 2L fluid restriction   --> NPO given tenuous respiratory status for now    Renal/Fluids/Electrolytes:  # CKD3b  Cr and BUN remained relatively stable albeit elevated throughout hospitalization and diuresis thus far. CO2 remains significantly elevated, though pt's mentation has been improving after being on BiPAP for an extended period of time.   - VBG per above   - Renal/electrolyte monitoring while diuresing per above    Endocrine:  # Hypothyroidism  # Graves disease s/p radioiodine ablation c/b hypothyroid   TSH 20.4 and free T4 0.82 on admission.   - Endocrine consulted, recs:   - Continue levothyroxine 200 mcg M-Sat- 400 mcg on Sunday.  Recheck TFT in a month     # Type II DM  A1c 13.5 on 12/25/2022.   - REDUCE PTA Lantus 40 units --> 30 units while NPO (hypoglycemic to 68 overnight 1/28 while not eating)  - Novolog mealtime insulin  1:8g CHO  - Continue high dose sliding scale   - POCT glucose checks  - Hypoglycemia protocol  - PTA Ozempic held while IP    ID:  # No acute concerns  Pt remains HDS, AF, no localizing sx of infection that would suggest contributing to encephalopathy. Remains OFF Abx.    Hematology:    # No acute concerns   - routine CBC monitoring    Musculoskeletal:  # bl UE pain, subacute-chronic, likely in setting of BUMEX GTT  Limited characterization 2/2 BiPAP. Pt reporting sore arms. Able to lift against gravity.   - Diclofenac gel trial   - further ascertainment once pt able to be off BiPAP    # Right toe wound  Present on admission, right great toe and 3rd toe, dry gangrene, pt unaware how long wounds have been present.   - WOCN following: wound cares to right toe daily, Paint  "toes with betadine. Be sure to apply over dry eschar. Ok to leave open to air. No soaking feet.  - Vascular consult placed  --> 1/27 LE arterial duplex US negative study  - Ortho foot and ankle consult placed; recommendations pending    General Cares/Prophylaxis:    DVT Prophylaxis: DOAC  GI Prophylaxis: Not indicated  Restraints: None  Family Communication: will be notified of transfer to ICU  Code Status: full    Lines/tubes/drains:  - PICC, PIV, phan    Disposition:  - Medical ICU pending respiratory status    Patient seen and findings/plan discussed with medical ICU staff, Dr. Valle.    Ashley Auguste MD      Clinically Significant Risk Factors              # Hypoalbuminemia: Lowest albumin = 2.7 g/dL at 1/24/2023  6:50 AM, will monitor as appropriate          # DMII: A1C = 13.5 % (Ref range: <5.7 %) within past 3 months   # Severe Obesity: Estimated body mass index is 81.13 kg/m  as calculated from the following:    Height as of this encounter: 1.702 m (5' 7\").    Weight as of this encounter: 235 kg (518 lb).                   -----------------------------------------------------------------------    HISTORY PRESENTING ILLNESS:     Unable to obtain significant HPI from pt 2/2 BiPAP mask and difficulty phonating.  Per chart review, pt was admitted to cardiology service 1/23 for decompensated heart failure with sx of SOB, chest tightness with exertion. Has been undergoing IV diuresis and was transitioned to a Bumex gtt 1/24 with net negative I/O results. Patient was started on acetazolamide 1/25 for  Augmentation of diuresis. Pt was reported to be increasingly lethargic and even obtunded/unresponsive during daytime-evening of 1/27. Trial of BiPAP started approx 14:00 without any clinical improvement in mental status and no significant change in VBG CO2. Decision was made to transfer pt to ICU for likely intubation; however upon arrival to ICU pt was alert, oriented, answering pointed questions " appropriately and attempting to converse, albeit limited 2/2 BiPAP and phonation. Reported bl UE arm soreness, improved with pillows, thought to be subacute-chronic. Denied other pain. Asked several times for food saying she had not eaten all day and was hungry. Denied any issues with the BiPAP mask, denied CP or SOB. Had just coughed up yellow mucus/phlegm prior to provider entering room.       REVIEW OF SYSTEMS:   10 point ROS otherwise limited 2/2 pt encephalopathy      PAST MEDICAL HISTORY:   Past Medical History:   Diagnosis Date     A-fib (H) 2011    on coumadin since 1/13     Asthma     as a kid     Chest pain 2/1/2017     Chronic anticoagulation for a-fib 2/15/2013    INR's followed by coumadin clinic at      Diabetes mellitus (H) 2012     Diastolic heart failure 2/15/2013     Dilated cardiomyopathy (H) 1/8/2013     HTN (hypertension)      Hyperthyroidism     Graves, s/p I131 1/13, now on prednisone and methimazole     Mixed type age-related cataract, both eyes 4/8/2022     Morbid obesity (H)      Pulmonary embolism (H) 1/12    hospitalized in [place], on lovenox/coumadin for a few months but stopped, hypercoag w/u neg per pt     Sleep apnea     using CPAP     SURGICAL HISTORY:  Past Surgical History:   Procedure Laterality Date     PICC INSERTION - TRIPLE LUMEN Right 01/24/2023    right cephalic 5 fr tl picc 48 cm     SOCIAL HISTORY:  Social History     Socioeconomic History     Marital status: Single     Spouse name: None     Number of children: None     Years of education: None     Highest education level: None   Tobacco Use     Smoking status: Light Smoker     Packs/day: 0.25     Years: 13.00     Pack years: 3.25     Types: Cigarettes     Smokeless tobacco: Never     Tobacco comments:     down to 2 cigs a day   Substance and Sexual Activity     Alcohol use: No     Drug use: No     Sexual activity: Yes     Partners: Male     Birth control/protection: Condom   Social History Narrative    Single, no children         Gyn:        Last pap several years ago, no abnormal    No STIs            Patient is single.  She is no longer working.  She used to work in the area of customer service.  She is currently living with her sister in [place], [place].  She has no pets.  Patient has smoked a half pack of cigarettes a day for the past 10 plus years.  She states that she is down to 5 cigarettes a day with the aid of Wellbutrin.  She does not smoke cigars, pipes or chew tobacco.  She has 1 cup of coffee in the morning.  She does not drink alcohol.  Patient does not exercise.      FAMILY HISTORY:   Family History   Problem Relation Age of Onset     Thyroid Disease Mother         Grave's D     Diabetes Mother      Heart Disease Mother      Cerebrovascular Disease Mother         dec. 56 yo     Hypertension Mother      Heart Disease Sister         Heart Murmur     Diabetes Sister      Heart Disease Father         dec in his 30s, MI     Psychotic Disorder Brother         Bipolar     Diabetes Brother      Thyroid Disease Brother         Hyper Thyroid     Heart Disease Brother      Thyroid Disease Sister         Hyper Thyroid     Thyroid Disease Sister         Hyper Thyroid     Thyroid Disease Sister         Mental Health Problems     Thyroid Disease Maternal Aunt      Thyroid Disease Maternal Uncle      Cancer No family hx of      Glaucoma No family hx of      Macular Degeneration No family hx of        ALLERGIES:   Allergies   Allergen Reactions     Penicillins Other (See Comments) and Unknown     CHILDHOOD ALLERGY  CHILDHOOD ALLERGY     Ibuprofen Hives and Rash     Ibuprofen Sodium Hives and Rash     MEDICATIONS:  sodium chloride (PF) 0.9% PF flush 10 mL    acetaminophen (TYLENOL) 325 MG tablet, Take 2 tablets (650 mg) by mouth 3 times daily as needed for mild pain or fever (total acetaminophen dose should not exceed 3000 mg per day)  albuterol (PROVENTIL) (2.5 MG/3ML) 0.083% neb solution, Take 1 vial (2.5 mg) by  nebulization every 6 hours as needed for shortness of breath / dyspnea or wheezing  albuterol (VENTOLIN HFA) 108 (90 Base) MCG/ACT inhaler, Inhale 2 puffs into the lungs every 6 hours as needed for shortness of breath / dyspnea  apixaban ANTICOAGULANT (ELIQUIS) 5 MG tablet, Take 1 tablet (5 mg) by mouth 2 times daily  atorvastatin (LIPITOR) 40 MG tablet, Take 1 tablet (40 mg) by mouth daily  bumetanide (BUMEX) 1 MG tablet, Take 5 tablets (5 mg) by mouth 2 times daily  capsaicin (ZOSTRIX) 0.025 % external cream, Apply 1 g topically 3 times daily as needed (use for neuropathy pain)  ciclopirox (LOPROX) 0.77 % cream, Apply topically 2 times daily To feet and toenails.  clotrimazole (LOTRIMIN) 1 % external cream, Apply topically 2 times daily as needed (skin irritation)  colchicine (COLCYRS) 0.6 MG tablet, Take 1-2 tablets by mouth daily as needed  diclofenac (VOLTAREN) 1 % topical gel, Apply 4 grams to knees or 2 grams to hands four times daily using enclosed dosing card.  docusate sodium (COLACE) 100 MG tablet, Take 100 mg by mouth daily as needed for constipation  Efinaconazole 10 % SOLN, Externally apply topically daily To toenails.  gabapentin (NEURONTIN) 300 MG capsule, Take 2 capsules (600 mg) by mouth 3 times daily  hydrALAZINE (APRESOLINE) 25 MG tablet, Take 0.5 tablets (12.5 mg) by mouth 3 times daily  hydrochlorothiazide (HYDRODIURIL) 12.5 MG tablet, Take 1 tablet (12.5 mg) by mouth 2 times daily  insulin aspart (NOVOLOG FLEXPEN) 100 UNIT/ML pen, INJECT 10 UNITS UNDER THE SKIN WITH MEALS, PLUS CORRECTION. Additional correction scale insulin as follows based on pre-meal glucose: 140 - 160 = + 1 unit;  161 - 180 = + 2 units, 181 - 200 = + 3 units, 201-220 = +4 units, 221-240 = +5 units, 241-260 = +6 units, 261-280 = +7 units, 281-300 = +8 units  insulin glargine U-300 (TOUJEO MAX SOLOSTAR) 300 UNIT/ML (2 units dial) pen, Inject subcu Toujeo 40 units at bedtime  insulin pen needle (BD RIVER U/F) 32G X 4 MM  miscellaneous, Use 6 daily or as directed  isosorbide dinitrate (ISORDIL) 20 MG tablet, Take 1 tablet (20 mg) by mouth 3 times daily  levothyroxine (SYNTHROID/LEVOTHROID) 200 MCG tablet, Take 1 tablet (200mcg) daily Monday-Saturday and take 2 tablets (400mcg) on Sunday  metoprolol succinate ER (TOPROL XL) 25 MG 24 hr tablet, Take 3 tablets (75 mg) by mouth daily  nicotine (NICOTROL) 10 MG/ML SOLN inhalation solution, Spray 1 spray in nostril every hour as needed for smoking cessation  nystatin (MYCOSTATIN) 940772 UNIT/GM external cream, Apply topically 2 times daily To toenails  oxyCODONE (ROXICODONE) 5 MG tablet, Take 5 mg by mouth 5 times daily  polyethylene glycol (MIRALAX/GLYCOLAX) powder, Take 17 g by mouth daily as needed for constipation  prednisoLONE acetate (PRED FORTE) 1 % ophthalmic suspension, As directed 1 Drop 3 times daily. Follow physician instructions for the operative eye.  Semaglutide, 1 MG/DOSE, 4 MG/3ML SOPN, Inject 1 mg Subcutaneous once a week  spironolactone (ALDACTONE) 50 MG tablet, Take 1 tablet (50 mg) by mouth daily  tiotropium (SPIRIVA HANDIHALER) 18 MCG inhalation capsule, Inhale contents of one capsule daily.  topiramate (TOPAMAX) 25 MG tablet, 25 mg at bedtime for 1 week, 50 mg at bedtime for 1 week and 75 mg daily at bedtime thereafter  ACCU-CHEK RON PLUS test strip, USE TO TEST BLOOD SUGAR FOUR TIMES A DAY  OR AS DIRECTED.  blood glucose (NO BRAND SPECIFIED) lancets standard, USE TO CHECK  blood sugar fasting each am  prelunch and predinner daily OR AS DIRECTED Call clinic to schedule MD APPT.  blood glucose (NO BRAND SPECIFIED) test strip, Use to test blood sugar 4 times daily or as directed. Accu check Ron plus  blood glucose (NO BRAND SPECIFIED) test strip, Use to test blood sugar 4 times daily or as directed.  blood glucose (NO BRAND SPECIFIED) test strip, Use to test blood sugars 3 times daily or as directed  blood glucose monitoring (ACCU-CHEK FASTCLIX) lancets, Use to  test blood sugar 4 times daily or as directed.or lancets that go with meter being used  blood glucose monitoring (IBG STAR) meter device kit, -PLEASE GIVE PATIENT A DEVICE HER INSURANCE WILL COVER-  blood glucose monitoring (NO BRAND SPECIFIED) meter device kit, Use to test blood sugar 4  times daily or as directed.  blood glucose monitoring (NO BRAND SPECIFIED) meter device kit, Use to test blood sugar 3 times daily or as directed.  Blood Pressure KIT, Use to check blood pressure as directed, once daily and as needed. Record results.  Continuous Blood Gluc Sensor (FREESTYLE BELLO 14 DAY SENSOR) MISC, Change every 14 days.  Continuous Blood Gluc Sensor (FREESTYLE BELLO 14 DAY SENSOR) MISC, 1 each every 14 days  Continuous Blood Gluc Sensor (FREESTYLE BELLO 2 SENSOR) MISC, 1 Units every 14 days Check BG 4 times daily before meals and at bedtime.  glucagon 1 MG SOLR injection, Inject 1 mg Subcutaneous every 15 minutes as needed for low blood sugar  order for DME, Left foot  order for DME, Equipment being ordered: [id number] $92   Plantar, fasciitis, LG, night splint  order for DME, Equipment being ordered: Challenger Wide walker if available - patient needs seat, basket and brakes.  ORDER FOR DME, Use your CPAP device as directed by your provider. Pressure change to min 13 max 18cwp  Respiratory Therapy Supplies (NEBULIZER COMPRESSOR) KIT, 1 Device 4 times daily as needed.        PHYSICAL EXAMINATION:  Temp:  [97.5  F (36.4  C)-98.3  F (36.8  C)] 98.3  F (36.8  C)  Pulse:  [84-98] 84  Resp:  [12-18] 18  BP: (100-152)/() 100/69  FiO2 (%):  [30 %] 30 %  SpO2:  [91 %-100 %] 100 %  General: NAD, BiPAP mask on, morbidly obese, attempting to speak with team  HEENT: NCAT, hair on chin, injected sclera bl without ocular pain reported  Neuro: A&Ox3, NAD, answers pointed questions appropriately  Pulm/Resp: distant breath sounds bilaterally on anterior auscultation without rhonchi, crackles or wheeze appreciated. No  cough  CV: irregularly irregular, no M/R/G appreciated, distant heart sounds. Pedal pulses faintly dopplerable  Abdomen: Soft, non-distended, non-tender, obese, BS+   : phan catheter in place, urine yellow and clear  Incisions/Skin: skin thickening on bl UE/LE, RLE with darkened toes, see 1/27 vascular surgery note for photos    LABS: Reviewed.   Arterial Blood Gases   No lab results found in last 7 days.  Complete Blood Count   Recent Labs   Lab 01/27/23  0502 01/26/23  0502 01/25/23  0622 01/24/23  0650   WBC 8.5 6.6 8.2 8.5   HGB 14.9 15.1 15.2 15.0    240 249 242     Basic Metabolic Panel  Recent Labs   Lab 01/27/23  2234 01/27/23  1733 01/27/23  1617 01/27/23  1409 01/27/23  0903 01/27/23  0502 01/26/23  1705 01/26/23  1530 01/26/23  0824 01/26/23  0502   NA  --   --  140  --   --  137  --  136  --  140   POTASSIUM  --   --  3.9  --   --  4.0  --  4.3  --  4.4   CHLORIDE  --   --  94*  --   --  94*  --  95*  --  101   CO2  --   --  35*  --   --  32*  --  29  --  32*   BUN  --   --  58.6*  --   --  60.6*  --  63.1*  --  61.7*   CR  --   --  1.51*  --   --  1.47*  --  1.49*  --  1.56*   * 156* 83 153*   < > 110*   < > 140*   < > 102*    < > = values in this interval not displayed.     Liver Function Tests  Recent Labs   Lab 01/27/23  0916 01/25/23  0622 01/24/23  0650 01/23/23  1537   AST 17  --  23 23   ALT 6*  --  11 16   ALKPHOS  --   --  106* 120*   BILITOTAL  --   --  1.4* 1.6*   ALBUMIN  --   --  2.7* 3.0*   INR  --  1.44* 1.57*  --      Coagulation Profile  Recent Labs   Lab 01/25/23  0622 01/24/23  0650   INR 1.44* 1.57*       IMAGING:  Recent Results (from the past 24 hour(s))   US Lower Extremity Arterial Duplex Right    Narrative    ULTRASOUND LOWER EXTREMITY ARTERIAL DUPLEX RIGHT 1/27/2023 2:59 PM    CLINICAL HISTORY: Necrotic toes, monophasic dorsalis pedis doppler  signal. Normal JOSETTE and TBI 1/24/2023     COMPARISONS: JOSETTE and TBI 1/24/2023    REFERRING PROVIDER: JACINTO  BACU    TECHNIQUE: Right leg arteries evaluated with color Doppler and  spectral pulsed wave Doppler ultrasound.    FINDINGS: RIGHT:  Common femoral artery: 78/0 cm/s, triphasic  Profundus femoral artery: 49/0 cm/s, triphasic    Superficial femoral artery, origin: 96/0 cm/s, triphasic  Superficial femoral artery, mid thigh: 86/0 cm/s, triphasic  Superficial femoral artery, distal thigh: 69/0 cm/s, triphasic    Popliteal artery: 79/0 cm/s, triphasic    Posterior tibial artery, ankle: 74/0 cm/s, triphasic  Anterior tibial artery, ankle: 62/0 cm/s, triphasic    Dorsalis pedis artery: 44/0 cm/s, triphasic      Impression    IMPRESSION: Negative study. Triphasic waveforms and adequate  velocities in the right posterior tibial, anterior tibial, and  dorsalis pedis arteries.    GEORGE MCGOWAN MD         SYSTEM ID:  [id number]

## 2023-01-28 NOTE — PROGRESS NOTES
Admitted/transferred from: 6B  Reason for admission/transfer: altered mental status- potential need for intubation  Patient status upon admission/transfer: patient arouses to voice, answers questions and follow commands. Has trouble finding words and repetition of words. Orientedx2. Complains of pain in arms and legs especially when moved or touched. afib rate . BP stable, afebrile, BG 68,   Interventions: BiPAP continues, bed bath given, 25ml of D50 given for blood glucose of 68.  Plan: Continue to monitor mental status- if continues to improve patient can be [place] [facility] [place]  2 RN skin assessment: completed by Floridalma RN and Anshu RN  Result of skin assessment and interventions/actions: a few scabs in different healing stages found on abdomen and side. Bilateral toes and feet nercrotic and purulent drainage- prevlon boots applied. Heels crackling.   Right hip/It- small open soar- MD placed WOC consult- site cleaned and left oen to air.  interdry proventative- skin appeared intact but odorous.   Height, weight, drug calc weight: Done  Patient belongings (see Flowsheet - Adult Profile for details): bag of clothing  MDRO education (if applicable): NA

## 2023-01-28 NOTE — H&P
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 Remains on BiPAP with low threshold for intubation.     CHANGES and MAJOR THINGS TODAY:   - Hold diuresis per Cardiology   - Continue BiPAP   - Hold PO medications while on BiPAP  - q12hr VBG   - q8hr electrolytes   - Hold insulin due to hypoglycemia; d10 drip   - If hypotensive, OK for IVF bolus     PLAN:    Neuro:     # Encephalopathy in setting of hypercarbia, acute on chronic  Patient with increasing somnolence 1/27 without improvement on BiPAP. Currently with fluctuating mental status- awakens and converses appropriately, but quickly returns to sleep. Suspect encephalopathy secondary to worsened hypercarbia. Patient likely chronic CO2 retainer given elevated BMI. Has undergone aggressive diuresis during hospitalization (net negative 10L 1/27), which may have prompted contraction alkalosis with respiratory compensation. However, pH remains normal- which does not fit with her level of encephalopathy. Current NIRAJ- not elevated to level of uremic encephalopathy. LFT's reassuring against hepatic encephalopathy. Has had persistent hypoglycemia, only while NPO on BiPAP. Less suspicious for infectious source as patient is afebrile without leukocytosis and normal lactate. No significant sedative use. No history COPD.  - Hold diuresis 1/28; restart 1/29 with 4mg IV Bumex, goal 2L net negative   - Continue BiPAP; low threshold for intubation if worsening   - q12hr VBG   - q8hr electrolytes       Pulmonary:     # Acute on chronic hypercarbic respiratory failure requiring BiPAP  Pt progressively lethargic/obtunded, started on BiPAP during daytime 1/27. Transferred to ICU for failure for mental status to improve, however arrived A&Ox4, tolerating BiPAP well. Likely a chronic retainer of CO2 in setting of morbid obesity, and pt's baseline CO2 likely chronically elevated.    - Continue BiPAP; low threshold to intubate should pt's respiratory status decompensate  - q12hr VBG     # Asthma  - Continue PTA inhalers (levalbuterol and spiriva)     # JYOTI  - Resume nighttime CPAP once returns to baseline     Cardiovascular:    # Acute on chronic systolic heart failure with mildly reduced EF (EF 40-45% 1/23/23)  # Lactic acidosis 2/2 CHF exacerbation, resolved  # Troponin elevation 2/2 CHF  # HTN  Admitted with increasing SOB, MURRAY, chest tightness, weight gain in spite of compliance with PO diuretics. NT BNP 5891 (was 2030 during most recent admission).  Lactic acid 2.2 on admission with improvement to 1.1 with diuresis. Troponin mildly elevated, 67-->75-->60. 2/2 CHF exacerbation, no concern for ACS currently. Most recent Echo with EF 40-45%. Coronary angiogram 2012 with no significant CAD. Of note, pt recently admitted 12/25-1/1 HF exacerbation, diuresed to 472lbs. Pt weight on admission 1/23 555lbs. Aggressively diuresed with net negative 10L 1/27.   - Hold diuresis 1/28; restart 1/29 with 4mg IV Bumex, goal 2L net negative   - Hold Metoprolol 50 mg, Hydralazine 12.5 mg TID, Isordil 20 mg TID, Dilitazem 180 mg BID, Spironolactone 50 mg daily, Atorvastatin 40 mg daily in setting of BiPAP   - Cardiology planned to start Entresto with improvement to NIRAJ   - Strict I/O, daily weights  - q8hr electrolytes; replacement protocols in place   - CORE consult  - Lymphedema consult     # Long-standing persistent a-fib   # HTN  CHADSVASC 3  - Hold PTA Abixapan in setting of BiPAP; consider transitioning to subQ/gtt AC  administration if remains on BiPAP 1/29      GI/Nutrition:     # Morbid obesity  Nutrition consulted for CHF 2g Na diet, 2L fluid restriction previously. Currently NPO given BiPAP and somnolence.   - Hold insulin 1/28 for hypoglycemia      Renal/Fluids/Electrolytes:    # CKD3b  Cr and BUN remained relatively stable albeit elevated throughout hospitalization and diuresis thus far. Suspect due to diuresis.   - Hold diuresis 1/28; restart 1/29 with 4mg IV Bumex, goal 2L net negative   - q8hr electrolytes; replacement protocols in place      Endocrine:    # Hypothyroidism  # Graves disease s/p radioiodine ablation c/b hypothyroid   TSH 20.4 and free T4 0.82 on admission. Holding PO medications while on BiPAP.   - Endocrine consulted, appreciate recommendations   - HOLD levothyroxine 200 mcg M-Sat- 400 mcg on Sunday.  Recheck TFT in a month     # Type II DM  A1c 13.5 on 12/25/2022. Hypoglycemic while NPO on BiPAP.   - D10 drip titratable per RN; confirm hourly amount to monitor fluid status   - Hold Lantus 40 units nightly   - Hold Novolog mealtime insulin  1:8g CHO (HOLD)  - Hold high dose sliding scale   - POCT glucose checks  - Hypoglycemia protocol  - PTA Ozempic held while IP     ID:  # No acute concerns  Pt remains HDS, AF, no localizing sx of infection that would suggest contributing to encephalopathy. Remains OFF Abx.     Hematology:    # No acute concerns   - Routine CBC monitoring     Musculoskeletal:    # Right toe wound  # Dry gangrene   Present on admission, right great toe and 3rd toe, dry gangrene, pt unaware how long wounds have been present. Uncertain etiology. Evaluated by Vascular with normal BLE arterial duplex.   - WOCN following: wound cares to right toe daily, Paint toes with betadine. Be sure to apply over dry eschar. Ok to leave open to air. No soaking feet.  - Vascular Surgery and Ortho Foot/Ankle following; appreciate recommendations     # bl UE pain, subacute-chronic, likely in setting of  "BUMEX GTT  Limited characterization 2/2 BiPAP. Pt reporting sore arms. Able to lift against gravity.   - Diclofenac gel trial   - further ascertainment once pt able to be off BiPAP      General Cares/Prophylaxis:    DVT Prophylaxis: Mechanical (DOAC held while on BiPAP)   GI Prophylaxis: Not indicated  Restraints: None  Family Communication: Contact number in chart does not work. Unable to obtain additional information from patient due to somnolence.   Code Status: Full      Lines/tubes/drains:  - PICC, PIV, phan     Disposition:  - Medical ICU pending respiratory status    Patient seen and findings/plan discussed with medical ICU staff, Dr. Billingsley.    Regina Willoughby MD    Clinically Significant Risk Factors           # Hypercalcemia: corrected calcium is >10.1, will monitor as appropriate    # Hypoalbuminemia: Lowest albumin = 2.7 g/dL at 1/24/2023  6:50 AM, will monitor as appropriate          # DMII: A1C = 13.5 % (Ref range: <5.7 %) within past 3 months   # Severe Obesity: Estimated body mass index is 75.18 kg/m  as calculated from the following:    Height as of this encounter: 1.702 m (5' 7\").    Weight as of this encounter: 217.7 kg (480 lb).                   ====================================  INTERVAL HISTORY:     ON: Transferred to MICU due to worsening somnolence and hypercarbia despite BiPAP. On arrival to MICU, was A&Ox4. No intubation. Per RN notes, has waxing and waning alertness. Also having low blood glucose overnight. Still on Bumex gtt and Acetazolamide BID.     Subjective: Patient briefly awakens. Able to state that she is at the [facility]. Reports pain in legs and back. Falls back to sleep quickly.     OBJECTIVE:   1. VITAL SIGNS:   Temp:  [97.6  F (36.4  C)-98.7  F (37.1  C)] 97.7  F (36.5  C)  Pulse:  [] 106  Resp:  [12-24] 19  BP: ()/() 98/59  FiO2 (%):  [30 %] 30 %  SpO2:  [93 %-100 %] 98 %  FiO2 (%): 30 %  Resp: 19    2. INTAKE/ OUTPUT:   I/O last 3 completed " shifts:  In: 898.2 [P.O.:600; I.V.:298.2]  Out: 47400 [Urine:57206]    3. PHYSICAL EXAMINATION:  General: NAD, BiPAP mask on  HEENT: NCAT, hair on chin, injected sclera bl without ocular pain reported  Neuro: A&Ox3, NAD, awakens briefly and converses before falling asleep  Pulm/Resp: distant breath sounds bilaterally on anterior auscultation without rhonchi, crackles or wheeze appreciated. No cough  CV: irregularly irregular, no M/R/G appreciated, distant heart sounds. Pedal pulses faintly palpated   Abdomen: Soft, non-distended, non-tender, obese, BS+   : phan catheter in place, urine yellow and clear  Incisions/Skin: skin thickening on bl UE/LE, RLE with darkened toes, see 1/27 vascular surgery note for photos    4. LABS:   Arterial Blood Gases   No lab results found in last 7 days.  Complete Blood Count   Recent Labs   Lab 01/28/23  0311 01/27/23  0502 01/26/23  0502 01/25/23  0622   WBC 8.0 8.5 6.6 8.2   HGB 15.3 14.9 15.1 15.2    221 240 249     Basic Metabolic Panel  Recent Labs   Lab 01/28/23  1344 01/28/23  1307 01/28/23  1216 01/28/23  1146 01/28/23  1047 01/28/23  1023 01/28/23  0456 01/28/23  0311 01/27/23  1733 01/27/23  1617 01/27/23  0903 01/27/23  0502   NA  --   --   --   --   --  143  --  142  --  140  --  137   POTASSIUM  --   --   --   --   --  3.6  --  4.2  --  3.9  --  4.0   CHLORIDE  --   --   --   --   --  95*  --  93*  --  94*  --  94*   CO2  --   --   --   --   --  40*  --  33*  --  35*  --  32*   BUN  --   --   --   --   --  54.3*  --  56.1*  --  58.6*  --  60.6*   CR  --   --   --   --   --  1.36*  --  1.41*  --  1.51*  --  1.47*   GLC 73 105* 144* 65*   < > 51*   < > 71   < > 83   < > 110*    < > = values in this interval not displayed.     Liver Function Tests  Recent Labs   Lab 01/28/23  0311 01/27/23  0916 01/25/23  0622 01/24/23  0650 01/23/23  1537   AST  --  17  --  23 23   ALT 11 6*  --  11 16   ALKPHOS 103  --   --  106* 120*   BILITOTAL 1.6*  --   --  1.4* 1.6*    ALBUMIN 3.1*  --   --  2.7* 3.0*   INR  --   --  1.44* 1.57*  --      Coagulation Profile  Recent Labs   Lab 01/25/23  0622 01/24/23  0650   INR 1.44* 1.57*       5. RADIOLOGY:   Recent Results (from the past 24 hour(s))   US Lower Extremity Arterial Duplex Right    Narrative    ULTRASOUND LOWER EXTREMITY ARTERIAL DUPLEX RIGHT 1/27/2023 2:59 PM    CLINICAL HISTORY: Necrotic toes, monophasic dorsalis pedis doppler  signal. Normal JOSETTE and TBI 1/24/2023     COMPARISONS: JOSETTE and TBI 1/24/2023    REFERRING PROVIDER: JACINTO CONN    TECHNIQUE: Right leg arteries evaluated with color Doppler and  spectral pulsed wave Doppler ultrasound.    FINDINGS: RIGHT:  Common femoral artery: 78/0 cm/s, triphasic  Profundus femoral artery: 49/0 cm/s, triphasic    Superficial femoral artery, origin: 96/0 cm/s, triphasic  Superficial femoral artery, mid thigh: 86/0 cm/s, triphasic  Superficial femoral artery, distal thigh: 69/0 cm/s, triphasic    Popliteal artery: 79/0 cm/s, triphasic    Posterior tibial artery, ankle: 74/0 cm/s, triphasic  Anterior tibial artery, ankle: 62/0 cm/s, triphasic    Dorsalis pedis artery: 44/0 cm/s, triphasic      Impression    IMPRESSION: Negative study. Triphasic waveforms and adequate  velocities in the right posterior tibial, anterior tibial, and  dorsalis pedis arteries.    GEORGE MCGOWAN MD         SYSTEM ID:  [id number]

## 2023-01-28 NOTE — PROGRESS NOTES
Cards 2 Night Float Cross Cover Note    Writer was notified by bedside RN about pt's continued obtundation and CO2 not improving on AVAPS.    Earlier in the day, pt was noted to be newly obtunded and VBG was notable for hypercapnia CO2 of 83, pH of 7.31. Previous CO2 noted to be 63 on 12/25/22. PMH notable for morbid obesity BMI 81, hx of JYOTI on CPAP nightly. Patient was placed on BIPAP by the day team, and VBG recheck 2 hours later showed 7.35/76. Writer ordered repeat VBG which came back largely unchanged 7.35/75. Writer reached out to RT who stated pt is likely heading towards intubation given lack of improvement with positive ventilation. Writer assessed pt bedside and pt was only arousable to pain, not following directions. MICU attending physician was contacted regarding the above, and transfer to ICU was ordered given need for intubation for continued AMS despite BIPAP/AVAPS.     Regina Garcia MD  Internal Medicine Resident, PGY-3  Cards 2 Night Float

## 2023-01-28 NOTE — PROGRESS NOTES
NURSING PROGRESS NOTE  Shift Summary      Date: January 27, 2023     Neuro/Musculoskeletal:  Patient difficult to rouse. Tingling and numbness in extremities at baseline.  Cardiac:  Afib with irregular underlying rhythm.  VSS.     Respiratory:  Sating in the 90s on RA.  GI/:  Adequate urine output.  LBM: 1/26/23.  Diet/Appetite:  Tolerating regular diet.  Activity:  Assist of 2-3 to reposition.   Pain:  C/o generalized pain, especially when moving extremities.  Skin:  No new deficits noted.   LDAs + Drips/IVF:  Right triple lumen PICC.  is running Bumex, other caps are heparin locked. Left PIV is patent and saline locked.  Protocols/Labs:  VBGs are being watched d/t patient's lethargy.    Pertinent Shift Updates:  Patient became lethargic and is almost obtunded. It was discovered that the patient is not able to blow off her CO2. She has been placed on AVAPS/ BiPAP and her CO2 is slowly recovering.        Stephen Fry RN  .................................................... January 27, 2023   8:11 PM  [facility] ([facility]): [facility] ICU (Unit 6D)

## 2023-01-29 NOTE — PLAN OF CARE
ICU End of Shift Summary. See flowsheets for vital signs and detailed assessment.    Changes this shift: Pt still on BiPAP at 30% (AVAPS). Serial VBGs show slight improvement. Pt remains very drowsy and lethargic. Oriented to self, place, and sometimes the year. Eyes open to voice/gentle touch and for less than 3 seconds. HR still in Afib with rate 110's -120's and trending up. Left arm BP cuff pressure every 15 minutes, BP trending down. Notify team for MAP less than 65. Blood glucose still low (). Pt received 2 amps D50 today, then D10 gtt was started. Then order changed to D20 gtt, titrate for BG . Bumex gtt stopped this morning at 08:30 by RN, due to significant urine output. Urine output since  midnight is 12.7 liters, and as of 19:00,  pt is net negative 12.3 liters. Both MICU and Cards teams are aware.     Plan:  Continue to monitor VS, and labs.Pt has 1:1 sitter to monitor for chocking/emesis while on bipap.    Goal Outcome Evaluation:    Problem: Plan of Care - These are the overarching goals to be used throughout the patient stay.    Goal: Absence of Hospital-Acquired Illness or Injury  Intervention: Identify and Manage Fall Risk  Recent Flowsheet Documentation  Taken 1/28/2023 1600 by Rajwinder Page RN  Safety Promotion/Fall Prevention:    activity supervised    bedside attendant    clutter free environment maintained    fall prevention program maintained    lighting adjusted    mobility aid in reach    patient and family education    sitter at bedside  Taken 1/28/2023 1200 by Rajwinder Page RN  Safety Promotion/Fall Prevention:    activity supervised    bedside attendant    clutter free environment maintained    fall prevention program maintained    lighting adjusted    mobility aid in reach    patient and family education    sitter at bedside  Taken 1/28/2023 0745 by Rajwinder Page RN  Safety Promotion/Fall Prevention:    activity supervised    bedside attendant    clutter  free environment maintained    fall prevention program maintained    lighting adjusted    mobility aid in reach    patient and family education    sitter at bedside  Intervention: Prevent Skin Injury  Recent Flowsheet Documentation  Taken 1/28/2023 1400 by Rajwinder Page RN  Body Position:    turned    right    foot of bed elevated    legs elevated    head repositioned, right    side-lying 30 degrees    upper extremity elevated  Taken 1/28/2023 1145 by Rajwinder aPge RN  Body Position:    turned    left    foot of bed elevated    heels elevated    legs elevated    head repositioned, left    side-lying 30 degrees    upper extremity elevated  Taken 1/28/2023 0900 by Rajwinder Page RN  Body Position:    turned    right    foot of bed elevated    heels elevated    legs elevated    head repositioned, right    side-lying 30 degrees    upper extremity elevated  Intervention: Prevent and Manage VTE (Venous Thromboembolism) Risk  Recent Flowsheet Documentation  Taken 1/28/2023 1600 by Rajwinder Page RN  VTE Prevention/Management: SCDs (sequential compression devices) off  Taken 1/28/2023 1200 by Rajwinder Page RN  VTE Prevention/Management: SCDs (sequential compression devices) off  Taken 1/28/2023 0745 by Rajwinder Page RN  VTE Prevention/Management: SCDs (sequential compression devices) off  Goal: Optimal Comfort and Wellbeing  Intervention: Provide Person-Centered Care  Recent Flowsheet Documentation  Taken 1/28/2023 1600 by Rajwinder Page RN  Trust Relationship/Rapport:    care explained    choices provided    emotional support provided    empathic listening provided    questions answered    questions encouraged    reassurance provided    thoughts/feelings acknowledged  Taken 1/28/2023 1200 by Rajwinder Page RN  Trust Relationship/Rapport:    care explained    choices provided    emotional support provided    empathic listening provided    questions answered    questions  encouraged    reassurance provided    thoughts/feelings acknowledged  Taken 1/28/2023 0745 by Rajwinder Page, RN  Trust Relationship/Rapport:    care explained    choices provided    emotional support provided    empathic listening provided    questions answered    questions encouraged    reassurance provided    thoughts/feelings acknowledged

## 2023-01-29 NOTE — PROGRESS NOTES
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 Initially transferred for intubation, but patient remained on BiPAP throughout ICU admission. Improved 1/29 after holding diuretics. Saturating well with NC O2 at this time.        Changes today   -Restart PM Glargine if POC glucose tolerating  -BiPAP while asleep.  -Hold diuresis for today  -Repeat VBG tomorrow  -Holding Hydral isordil for concerns of soft BP.  -Reduce metoprolol to 25 mg     #Hypercarbia  #Somnolence requiring BiPAP  #Encephalopathy in setting of hypercarbia, acute on chronic, improved  -VBG improved, Ammonia negative  -BiPAP while asleep     # Acute on chronic systolic heart failure with mildly reduced EF (EF 40-45%)  # Long-standing persistent afib   # HTN  # Lactic acidosis 2/2 CHF exacerbation, resolved  # Troponin elevation 2/2 CHF  Pt admitted with c/o increasing SOB, MURRAY, chest tightness, weight gain in spite of compliance with PO diuretics. NT BNP 5891 (was 2030 during most recent admission).  Lactic acid 2.2 on admission with improvement to 1.1 with diuresis. Troponin mildly elevated, 67-->75-->60. 2/2 CHF exacerbation, no concern for ACS currently. Most recent Echo with EF 40-45%. Coronary angiogram 2012 with no significant CAD. Of note, pt recently admitted 12/25-1/1 HF exacerbation, diuresed to 472lbs. Pt weight on admission 1/23 555lbs.      - Volume Status: Currently holding diuretics. Diuresed -37 L since admission weight is  reduced from 555 lbs to 435 lbs.  - Beta-blocker: Reduced PTA Metoprolol to 50 mg daily--> Reduced now to 25 mg daily on 1/29.   - ACEi/ARB/ARNI: pending Cr, BP, will start Entresto (likely in coming days)  - Continue PTA hydral/isordil for now, plan to discontinue when Entresto started  - SGLT2i: not indicated in setting of reccurent yeast infections  - Rate control: BB as above, started PO Dilt 180 mg BID HR goal <110  -Anticoagulation: CHADSVASC 3, continue PTA apixaban  - Strict I/O, daily weights  - BID BMP while aggressively diuresing, with lyte replacement per procotol   - CORE consult appreciate recommendations   - Lymphedema consult  appreciate recommendations   - Continue isordil 20 mg TID and hydralazine 12.5 mg TID     # Hypothyroidism  # Graves disease s/p radioiodine ablation c/b hypothyroid   TSH 20.4 and free T4 0.82 on admission.   - Endocrine consulted Now signed off.   - Continue levothyroxine 200 mcg M-Sat- 400 mcg on Sunday.  Recheck TFT in a month     # Type II DM now with hypoglycemia   # Morbid obesity  A1c 13.5 on 12/25/2022.   -  Lantus 20 units daily (Currently held)  - Novolog mealtime insulin  1:8g CHO (Held)  - Continue high dose sliding scale  (Currently held   - POCT glucose checks QID   - Hypoglycemia protocol  - PTA Ozempic held while IP  - Dextrose drip for blood glucose  will transition off this prior to arriving to unit. Encourage PO intake      # Right toe wound  Present on admission, right great toe and 3rd toe, dry gangrene, pt unaware how long wounds have been present.   - WOCN following: wound cares to right toe daily, Paint toes with betadine. Be sure to apply over dry eschar. Ok to leave open to air. No soaking feet.  - Vascular consult placed today     # Asthma  - Continue PTA inhalers (levalbuterol and spiriva)     # JYOTI  - Continue BiPAP at night    # HLD  - Continue lipitor 40 mg daily     FEN: 2 gm Sodium, 2L Fluid restriction  Code status: Full  Prophylaxis: PO  "Eliquis  Isolation: None  Disposition: possible discharge home 5-7 days pending stable volume status     Patient seen and discussed with Dr. Hillman, who agrees with above plan.     Vladislav Robbins MD  Cardiology Fellow PGY 6    Interval History     Recently Transferred to MICU due to hypercarbia despite BiPAP. Did not require intubation. Overall improved from prior. Now with plans to transfer back to cardiology service .    On interview this AM she reports concern about being in the ICU. Breathing improved. Reports mentation is improved, No shortness of breath, Chest pain palpitations, dizziness.    ROS: A 4-point ROS was otherwise negative except as above.    Data reviewed today: I reviewed all new labs and imaging results over the last 24 hours. I personally reviewed:    Physical Exam   Temp: 97.7  F (36.5  C) Temp src: Axillary BP: (!) 106/91 Pulse: 114   Resp: 22 SpO2: 99 % O2 Device: BiPAP/CPAP Oxygen Delivery: 3 LPM  Vitals:    01/27/23 0029 01/28/23 0200 01/29/23 0200   Weight: (!) 235 kg (518 lb) (!) 217.7 kg (480 lb) (!) 197.3 kg (435 lb)     Vital Signs with Ranges  Temp:  [97.3  F (36.3  C)-97.7  F (36.5  C)] 97.7  F (36.5  C)  Pulse:  [] 114  Resp:  [18-24] 22  BP: ()/(44-93) 106/91  FiO2 (%):  [30 %] 30 %  SpO2:  [87 %-100 %] 99 %  I/O last 3 completed shifts:  In: 447.5 [I.V.:447.5]  Out: 17671 [Urine:26610]     , Blood pressure (!) 106/91, pulse 114, temperature 97.7  F (36.5  C), temperature source Axillary, resp. rate 22, height 1.702 m (5' 7\"), weight (!) 197.3 kg (435 lb), SpO2 99 %, not currently breastfeeding.  435 lbs 0 oz     GEN:  Alert, interactive, appears comfortable, NAD with bipap mask off  CV:  Regular rate and rhythm, no m/r/g. JVD difficult to assess  LUNGS:  CTAB, no wheezes or crackles  ABD:  Distended, non tender   EXT:  +2 edema .    SKIN: Warm and dry, no lesions on exposed surfaces    Medications     [Held by provider] bumetanide Stopped (01/28/23 0830)     dextrose " 20 mL/hr at 01/29/23 1000     - MEDICATION INSTRUCTIONS -       ACE/ARB/ARNI NOT PRESCRIBED         apixaban ANTICOAGULANT  5 mg Oral BID     atorvastatin  40 mg Oral Daily     diltiazem ER COATED BEADS  180 mg Oral BID     gabapentin  600 mg Oral TID     heparin lock flush  5-20 mL Intracatheter Q24H     hydrALAZINE  12.5 mg Oral TID     [Held by provider] insulin aspart   Subcutaneous TID w/meals     [Held by provider] insulin aspart  1-10 Units Subcutaneous TID AC     [Held by provider] insulin aspart  1-7 Units Subcutaneous At Bedtime     [Held by provider] insulin glargine  20 Units Subcutaneous At Bedtime     isosorbide dinitrate  20 mg Oral TID     ketorolac  1 drop Right Eye TID     levothyroxine  200 mcg Oral Once per day on Mon Tue Wed Thu Fri Sat     levothyroxine  400 mcg Oral Weekly     metoprolol succinate ER  50 mg Oral Daily     miconazole   Topical BID     nicotine  1 patch Transdermal Daily    And     nicotine   Transdermal Q8H OPHELIA     nystatin   Topical BID     sennosides  8.6 mg Oral BID     sodium chloride (PF)  10-40 mL Intracatheter Q8H     spironolactone  50 mg Oral Daily     terbinafine   Topical Daily     topiramate  75 mg Oral At Bedtime     umeclidinium  1 puff Inhalation Daily       Data   Recent Labs   Lab 01/29/23  1026 01/29/23  0843 01/29/23  0842 01/29/23  0607 01/29/23  0325 01/29/23  0319 01/28/23 2007 01/28/23 2003 01/28/23  1636 01/28/23  1631 01/28/23  0456 01/28/23  0311 01/27/23  1409 01/27/23  0916 01/27/23  0903 01/27/23  0502 01/25/23  0755 01/25/23  0622 01/24/23  0801 01/24/23  0650   WBC  --  9.5  --   --   --   --   --   --   --   --   --  8.0  --   --   --  8.5   < > 8.2  --  8.5   HGB  --  15.2  --   --   --   --   --   --   --   --   --  15.3  --   --   --  14.9   < > 15.2  --  15.0   MCV  --  97  --   --   --   --   --   --   --   --   --  98  --   --   --  100   < > 100  --  97   PLT  --  208  --   --   --   --   --   --   --   --   --  213  --   --   --  221    < > 249  --  242   INR  --   --   --   --   --   --   --   --   --   --   --   --   --   --   --   --   --  1.44*  --  1.57*   NA  --   --   --   --   --  143  --  143  --  141   < > 142   < >  --   --  137   < > 136   < > 139  139   POTASSIUM  --   --   --   --   --  3.5  --  3.5  --  3.5   < > 4.2   < >  --   --  4.0   < > 5.0   < > 4.7  4.7   CHLORIDE  --   --   --   --   --  94*  --  94*  --  94*   < > 93*   < >  --   --  94*   < > 98   < > 101  101   CO2  --   --   --   --   --  38*  --  40*  --  42*   < > 33*   < >  --   --  32*   < > 26   < > 25  25   BUN  --   --   --   --   --  49.5*  --  53.0*  --  53.3*   < > 56.1*   < >  --   --  60.6*   < > 60.1*   < > 55.7*  55.7*   CR  --   --   --   --   --  1.26*  --  1.32*  --  1.28*   < > 1.41*   < >  --   --  1.47*   < > 1.50*   < > 1.62*  1.62*   ANIONGAP  --   --   --   --   --  11  --  9  --  5*   < > 16*   < >  --   --  11   < > 12   < > 13  13   JUSTIN  --   --   --   --   --  9.5  --  9.5  --  9.8   < > 9.7   < >  --   --  9.2   < > 9.1   < > 9.0  9.0   *  --  175* 160*   < > 125*   < > 64*   < > 60*   < > 71   < >  --    < > 110*   < > 179*   < > 120*  120*   ALBUMIN  --   --   --   --   --   --   --   --   --   --   --  3.1*  --   --   --   --   --   --   --  2.7*   PROTTOTAL  --   --   --   --   --   --   --   --   --   --   --  7.6  --   --   --   --   --   --   --  6.3*   BILITOTAL  --   --   --   --   --   --   --   --   --   --   --  1.6*  --   --   --   --   --   --   --  1.4*   ALKPHOS  --   --   --   --   --   --   --   --   --   --   --  103  --   --   --   --   --   --   --  106*   ALT  --   --   --   --   --   --   --   --   --   --   --  11  --  6*  --   --   --   --   --  11   AST  --   --   --   --   --   --   --   --   --   --   --   --   --  17  --   --   --   --   --  23    < > = values in this interval not displayed.       Recent Results (from the past 24 hour(s))   XR Foot Port Right 2 Views    Narrative    EXAM: XR FOOT  PORT RIGHT 2 VIEWS  LOCATION: [facility]  DATE/TIME: 1/28/2023 5:32 PM    INDICATION: dry gangrene of 1, 3, and 4th toes  COMPARISON: None.      Impression    IMPRESSION: Moderate soft tissue swelling of the toes. There is soft tissue gas in what appears to be the great toe dorsally. No definite evidence of acute osteomyelitis or fracture. There is normal joint alignment. No significant degenerative changes.   Vascular calcification.     Attending Attestation: I saw, examined and evaluated the patient and reviewed all relevant data. I agree with the findings, and our shared assessment and plan of care documented in the edited note and summarized the mejia findings and plan with the patient.  - transition to PO diuretics tomorrow

## 2023-01-29 NOTE — PROGRESS NOTES
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 Initially transferred for intubation, but patient remained on BiPAP throughout ICU admission. Improved 1/29 after holding diuretics. Saturating well with NC O2 at this time.      CHANGES and MAJOR THINGS TODAY:   - Goal diuresis 2L negative daily; hold Bumex and Acetazolamide today as already at goal   - Off BiPAP; encourage use with sleeping, VBG PRN   - Space BMP to q12hr; electrolyte protocols in place   - Hold Hydralazine due to BP; continue other   - ADAT  - Continue D20, expect to discontinue when eating; hold insulin, reevaluate in PM need for glargine  - Transfer to Cardiology      PLAN:    Neuro:     # Encephalopathy in setting of hypercarbia, acute on chronic  Patient with increasing somnolence 1/27 without improvement on BiPAP. On transfer to ICU, was A&Ox4 but noted to have fluctuating mental status- awakened and conversed appropriately before quickly returning to sleep. Suspect encephalopathy secondary to worsened hypercarbia. Patient likely chronic CO2 retainer given elevated BMI. Has undergone aggressive diuresis during hospitalization (net negative 10L 1/27), which may have prompted contraction alkalosis with respiratory compensation. Diuresis held 1/28 with improved to VBG and mental status 1/29. Workup otherwise reassuring with negative ammonia, reassuring LFT's, improving NIRAJ. Less suspicious for infectious source as patient is  afebrile without leukocytosis and normal lactate. No significant sedative use. No history COPD.  - Hold diuresis 1/28; at net negative 2L   - Patient off BiPAP; encourage use with sleeping, VBG PRN   - q12hr electrolytes      Pulmonary:     # Acute on chronic hypercarbic respiratory failure requiring BiPAP  Pt progressively lethargic/obtunded, started on BiPAP during daytime 1/27. Transferred to ICU for failure for mental status to improve, however arrived A&Ox4. See above discussion for encephalopathy. Remained on BiPAP throughout ICU stay without need for intubation. Diuresis held 1/28 with improved to VBG and mental status 1/29. Currently saturating well on NC 3L.   - Patient off BiPAP; encourage use with sleeping, VBG PRN      # Asthma  - Continue PTA inhalers (levalbuterol and spiriva)     # JYOTI  - Resume nighttime CPAP once returns to baseline     Cardiovascular:    # Acute on chronic systolic heart failure with mildly reduced EF (EF 40-45% 1/23/23)  # Lactic acidosis 2/2 CHF exacerbation, resolved  # Troponin elevation 2/2 CHF  # HTN  Admitted with increasing SOB, MURRAY, chest tightness, weight gain in spite of compliance with PO diuretics. NT BNP 5891 (was 2030 during most recent admission).  Lactic acid 2.2 on admission with improvement to 1.1 with diuresis. Troponin mildly elevated, 67-->75-->60. 2/2 CHF exacerbation, no concern for ACS currently. Most recent Echo with EF 40-45%. Coronary angiogram 2012 with no significant CAD. Of note, pt recently admitted 12/25-1/1 HF exacerbation, diuresed to 472lbs. Pt weight on admission 1/23 555lbs. Aggressively diuresed with net negative 10L 1/27.    - Hold diuresis 1/28; at net negative 2L   - Continue PTA Metoprolol 50 mg, PTA Hydralazine 12.5 mg TID, Isordil 20 mg TID, Dilitazem 180 mg BID, Spironolactone 50 mg daily, Atorvastatin 40 mg daily (hold parameters in place)   - Cardiology planned to start Entresto with improvement to NIRAJ   - Strict I/O, daily weights  -  q12hr electrolytes; replacement protocols in place   - CORE consult  - Lymphedema consult     # Long-standing persistent a-fib   # HTN  CHADSVASC 3  - Continue PTA Abixapan      GI/Nutrition:     # Morbid obesity  Nutrition consulted for CHF 2g Na diet, 2L fluid restriction previously. Hypoglycemic while NPO for BiPAP and encephalopathy. Expect improvement with diet. Swallow study passed 1/28 PM.   - ADAT  - Continue D20 infusion for glucose ; discontinue when able after eating     - Hold insulin; reevaluate 1/29 PM after diet      Renal/Fluids/Electrolytes:    # CKD3b  Baseline creatinine appears to be around 1 from chart review. Elevated on admission, improved with diuresis- may have cardiorenal component. Improving 1/29.    - Hold diuresis 1/28; at net negative 2L   - q12hr electrolytes; replacement protocols in place      Endocrine:    # Hypothyroidism  # Graves disease s/p radioiodine ablation c/b hypothyroid   TSH 20.4 and free T4 0.82 on admission.  - Endocrine consulted, appreciate recommendations   - Continue levothyroxine 200 mcg M-Sat- 400 mcg on Sunday.  Recheck TFT in a month     # Type II DM  A1c 13.5 on 12/25/2022. Hypoglycemic while NPO for BiPAP and encephalopathy. Expect improvement with diet.  - Continue D20 infusion for glucose ; discontinue when able after eating     - Hold insulin; reevaluate 1/29 PM after diet    - Hold Lantus 40 units nightly    - Hold Novolog mealtime insulin  1:8g CHO    - Hold high dose sliding scale   - POCT glucose checks  - Hypoglycemia protocol  - PTA Ozempic held while IP     ID:  # No acute concerns  Pt remains HDS, AF, no localizing sx of infection that would suggest contributing to encephalopathy. Remains OFF Abx.     Hematology:    # No acute concerns   - Routine CBC monitoring     Musculoskeletal:    # Right toe wound  # Dry gangrene   Present on admission, right great toe and 3rd toe, dry gangrene, pt unaware how long wounds have been present.  "Uncertain etiology. Evaluated by Vascular with normal BLE arterial duplex. Evaluated by Ortho with plan for outpatient elective amputation, no inpatient interventions.   - WOCN following: wound cares to right toe daily, Paint toes with betadine. Be sure to apply over dry eschar. Ok to leave open to air. No soaking feet.  - Vascular Surgery and Ortho Foot/Ankle following; appreciate recommendations     # bl UE pain, subacute-chronic, likely in setting of BUMEX GTT  Limited characterization 2/2 BiPAP. Pt reporting sore arms. Able to lift against gravity.   - Diclofenac gel trial   - further ascertainment once pt able to be off BiPAP      General Cares/Prophylaxis:    DVT Prophylaxis: Apixaban    GI Prophylaxis: Not indicated  Restraints: None  Family Communication: Contact number in chart does not work. Asked patient 1/29 AM if there was family that she would like to be updated, and she declined.   Code Status: Full      Lines/tubes/drains:  - PICC, PIV, phan     Disposition:  - Medical ICU pending respiratory status    Patient seen and findings/plan discussed with medical ICU staff, Dr. Billingsley.    Regina Willoughby MD    Clinically Significant Risk Factors           # Hypercalcemia: corrected calcium is >10.1, will monitor as appropriate    # Hypoalbuminemia: Lowest albumin = 2.7 g/dL at 1/24/2023  6:50 AM, will monitor as appropriate          # DMII: A1C = 13.5 % (Ref range: <5.7 %) within past 3 months   # Severe Obesity: Estimated body mass index is 68.13 kg/m  as calculated from the following:    Height as of this encounter: 1.702 m (5' 7\").    Weight as of this encounter: 197.3 kg (435 lb).                   ====================================  INTERVAL HISTORY:     ON: Received 500 mL bolus for hypotension, improved since that time. Passed swallow test.     Subjective: Patient much more awake and alert compared to yesterday. Aware of reasons for hospitalization and transfer to ICU. Tearful- states that she does " not want to be in the ICU because she feels that it is the end of the line. Reports that she is feeling better, does not feel that she needs to be in ICU. States that she is hungry and would like to try and eat.     OBJECTIVE:   1. VITAL SIGNS:   Temp:  [97.3  F (36.3  C)-97.7  F (36.5  C)] 97.7  F (36.5  C)  Pulse:  [] 114  Resp:  [18-24] 22  BP: ()/(44-93) 106/91  FiO2 (%):  [30 %] 30 %  SpO2:  [87 %-100 %] 99 %  FiO2 (%): 30 %  Resp: 22    2. INTAKE/ OUTPUT:   I/O last 3 completed shifts:  In: 447.5 [I.V.:447.5]  Out: 53030 [Urine:78406]    3. PHYSICAL EXAMINATION:  General: Sitting up in bed, awake and alert, NAD, appears comfortably   HEENT: NCAT, hair on chin  Neuro: A&Ox3, NAD, moves all four extremities   Pulm/Resp: Distant breath sounds bilaterally on anterior auscultation without rhonchi, crackles or wheeze appreciated. No cough  CV: irregularly irregular, no M/R/G appreciated, distant heart sounds. Pedal pulses faintly palpated   Abdomen: Soft, non-distended, non-tender, obese, BS+   : phan catheter in place, urine yellow and clear  Incisions/Skin: skin thickening on bl UE/LE, RLE with darkened toes, see 1/27 vascular surgery note for photos    4. LABS:   Arterial Blood Gases   No lab results found in last 7 days.  Complete Blood Count   Recent Labs   Lab 01/29/23  0843 01/28/23  0311 01/27/23  0502 01/26/23  0502   WBC 9.5 8.0 8.5 6.6   HGB 15.2 15.3 14.9 15.1    213 221 240     Basic Metabolic Panel  Recent Labs   Lab 01/29/23  1026 01/29/23  0842 01/29/23  0607 01/29/23  0325 01/29/23  0319 01/28/23 2007 01/28/23 2003 01/28/23  1636 01/28/23  1631 01/28/23  1047 01/28/23  1023   NA  --   --   --   --  143  --  143  --  141  --  143   POTASSIUM  --   --   --   --  3.5  --  3.5  --  3.5  --  3.6   CHLORIDE  --   --   --   --  94*  --  94*  --  94*  --  95*   CO2  --   --   --   --  38*  --  40*  --  42*  --  40*   BUN  --   --   --   --  49.5*  --  53.0*  --  53.3*  --  54.3*   CR   --   --   --   --  1.26*  --  1.32*  --  1.28*  --  1.36*   * 175* 160* 122* 125*   < > 64*   < > 60*   < > 51*    < > = values in this interval not displayed.     Liver Function Tests  Recent Labs   Lab 01/28/23  0311 01/27/23  0916 01/25/23  0622 01/24/23  0650 01/23/23  1537   AST  --  17  --  23 23   ALT 11 6*  --  11 16   ALKPHOS 103  --   --  106* 120*   BILITOTAL 1.6*  --   --  1.4* 1.6*   ALBUMIN 3.1*  --   --  2.7* 3.0*   INR  --   --  1.44* 1.57*  --      Coagulation Profile  Recent Labs   Lab 01/25/23  0622 01/24/23  0650   INR 1.44* 1.57*       5. RADIOLOGY:   Recent Results (from the past 24 hour(s))   XR Foot Port Right 2 Views    Narrative    EXAM: XR FOOT PORT RIGHT 2 VIEWS  LOCATION: [facility]  DATE/TIME: 1/28/2023 5:32 PM    INDICATION: dry gangrene of 1, 3, and 4th toes  COMPARISON: None.      Impression    IMPRESSION: Moderate soft tissue swelling of the toes. There is soft tissue gas in what appears to be the great toe dorsally. No definite evidence of acute osteomyelitis or fracture. There is normal joint alignment. No significant degenerative changes.   Vascular calcification.

## 2023-01-29 NOTE — PLAN OF CARE
ICU End of Shift Summary. See flowsheets for vital signs and detailed assessment.    Changes this shift: Mentation waxing and waning. Lethargic, arousing to voice. Disoriented to situation, intermittently disoriented to place. Irritable after large turns or big movements with extremities. Garbled speech when patient is more lethargic. Following commands. MAP in the 50's at start of shift, slow bolus of LR administered with improvement. BPs stable since. Denies chestpain, dizziness, lightheadedness, or nausea. BiPAP remains on due to CO2 retention. Passed RN dysphagia screen when more alert. Swallowed pills PO with no difficulties. Net -14,452 1/28. Net negative -2,450L since midnight 1/29. D20 infusing to maintain glucose between , see eMAR.   Potassium replaced this shift. Pain in bilateral upper and bilateral lower extremities with movement, denies any pain at rest. Sitter remains at bedside while pt on BiPAP  Plan: Notify primary team of any changes. Trending VBGs, BMP, and blood glucose.    Goal Outcome Evaluation:    Plan of Care Reviewed With: patient    Overall Patient Progress: no change    Outcome Evaluation: Disoriented to situation, drowsy, 500LR bolus for low BP, large UOP via phan, continuous D20 infusing to maintain sugars

## 2023-01-29 NOTE — CONSULTS
[facility] Nurse Inpatient Assessment     Consulted for:Wound(s): right foot   1/29- New consult- R) hip    Patient History (according to provider note(s):      Pricilla Brown is a 47 year old female with PMHx of HFrEF (EF 20-25%, 12/2022) 2/2 NICM (diagnosed 2012), long-standing persistent AF (on Apxiban), HTN, insulin-dependent type II DM, morbid obesity, asthma, JYOTI and Graves disease s/p radioiodine ablation c/b hypothyroid, who was admitted on 1/23/2023 for ADHF.     Patient was admitted last month (12/25 to 1/1/2023) for ADHF, but after discharge, she started to feel short of breath at rest. She used to be able to get up and move around the house slowly, but now unable to move at all. She also mentioned chest tightness on exertion that only lasts for about 1-2 minutes. Denies fever, chest pain at rest, cough, PND, orthopnea, palpitations, syncope or near-syncope, hemoptysis, nausea, vomiting, abdominal pain, melena, hematochezia, constipation, diarrhea, dysuria, hematuria, headaches, local weakness, numbness/tingling of hands/feet.    Areas Assessed:      Areas visualized during today's visit: Focused: and Lower extremities and hip    Wound location: R) hip          Last photo: 1/29  Wound due to: Trauma  Wound history/plan of care: Possible from scratching, noted multiple other areas with evidence of healed tissue  Wound base: 100 % dermis,      Palpation of the wound bed: normal      Drainage: none     Description of drainage: none     Measurements (length x width x depth, in cm): 0.5  x 0.4  x  0.1 cm      Tunneling: N/A     Undermining: N/A  Periwound skin: Intact and two other area with recently epithelialized tissue      Color: pink      Temperature: normal   Odor: none  Pain: denies , none  Pain interventions prior to dressing change: N/A  Treatment goal: Heal  and Protection  STATUS: initial assessment and healing  Supplies ordered: supplies stored on  unit and discussed with RN    Wound location: Right Toes- stable on 1/29      Right lower leg 1/24/23    Right toes (top)      Right Toes (bottom)      Right toes lateral view 1/24/23    Last photo: 1/24/23  Wound due to: Unknown Etiology wound consistent with poor vasculature or frostbite.  Wound history/plan of care: new on this admission. Dry gangrene. Patient is unaware of how long her feet have looked like this and she reports having difficulty fully visualizing her feet. Patient also has neuropathy in the feet.   Wound base: 100 % eschar,     Palpation of the wound bed: firm      Drainage: none     Description of drainage: none     Measurements (length x width x depth, in cm): Unable to measure eschar located on great , 3th, and 4th toes   Periwound skin: Intact      Color: dusky      Temperature: normal   Odor: none  Pain: denies , none  Pain interventions prior to dressing change: N/A  Treatment goal: Keep dry eschar stable to prevent wet gangrene and Protection  STATUS: stable and follow up  Supplies ordered: gathered and at bedside    Treatment Plan:     Right toes:  Daily  Paint toes with betadine. Be sure to apply over dry eschar. Ok to leave open to air. No soaking feet.      R) hip: Every 3 days     Cleanse the area with NS and pat dry.    Apply No sting film barrier to periwound skin.    Cover wound with Mepilex    Change dressing Q 3 days.    Turn and reposition Q 2hrs side to side only.    Ensure pt has Lefty-cushion while sitting up in the chair.  FYI- If pt has constant incontinent loose stools needing dressing changes Q shift please discontinue the Mepilex dressing and apply criticaid barrier paste BID and PRN.    Orders: Written    RECOMMEND PRIMARY TEAM ORDER: Lymphedema consult and Vascular consult page sent.  Education provided: Not appropriate today Patient falling asleep.  Discussed plan of care with: Patient  WOC nurse follow-up plan: weekly  Notify WOC if wound(s) deteriorate.  Nursing to  notify the Provider(s) and re-consult the [facility] Nurse if new skin concern.    DATA:     Current support surface: Standard  Standard gel/foam mattress (IsoFlex, Atmos air, etc)  Containment of urine/stool: Indwelling catheter  BMI: Body mass index is 68.13 kg/m .   Active diet order: Orders Placed This Encounter      Advance Diet as Tolerated: Clear Liquid Diet     Output: I/O last 3 completed shifts:  In: 252.17 [I.V.:252.17]  Out: 97920 [Urine:83523]     Labs:   Recent Labs   Lab 01/28/23  0311 01/26/23  0502 01/25/23  0622   ALBUMIN 3.1*  --   --    HGB 15.3   < > 15.2   INR  --   --  1.44*   WBC 8.0   < > 8.2    < > = values in this interval not displayed.     Pressure injury risk assessment:   Sensory Perception: 2-->very limited  Moisture: 4-->rarely moist  Activity: 1-->bedfast  Mobility: 1-->completely immobile  Nutrition: 1-->very poor  Friction and Shear: 1-->problem  Shane Score: 10      Dept. Pager: [phone]  Dept. Office Number: [phone]

## 2023-01-30 NOTE — PROGRESS NOTES
Cardiology Progress Note      Assessment & Plan:  Pricilla Brown is a 47 year old female with a history of HFmrEF (EF 40-45%) 2/2 NICM, long-standing persistent AF, HTN, type II DM, morbid obesity, asthma, JYOTI and Graves disease s/p radioiodine ablation c/b hypothyroid admitted 1/23/2023 for heart failure exacerbation with hospital course complicated by CO2 retention and being obtunded, transferred to ICU on 1/28/2023 for encephalopathy suspected secondary to hypercapnia in setting of aggressive diuresis and contraction alkalosis despite using diamox. Initially transferred for intubation, but patient remained on BiPAP throughout ICU admission. Improved 1/29 after holding diuretics. Saturating well on RA at this time.      Today's Update:  - PO Diuretics  - SW and IP Cardiac rehab for discharge dispo    #Hypercarbia  #Somnolence requiring BiPAP  #Encephalopathy in setting of hypercarbia, acute on chronic, improved  -VBG improved, Ammonia negative  -BiPAP while asleep      # Acute on chronic systolic heart failure with mildly reduced EF (EF 40-45%)  # Long-standing persistent afib   # HTN  # Lactic acidosis 2/2 CHF exacerbation, resolved  # Troponin elevation 2/2 CHF  Pt admitted with c/o increasing SOB, MURRAY, chest tightness, weight gain in spite of compliance with PO diuretics. NT BNP 5891 (was 2030 during most recent admission).  Lactic acid 2.2 on admission with improvement to 1.1 with diuresis. Troponin mildly elevated, 67-->75-->60. 2/2 CHF exacerbation, no concern for ACS currently. Most recent Echo with EF 40-45%. Coronary angiogram 2012 with no significant CAD. Of note, pt recently admitted 12/25-1/1 HF exacerbation, diuresed to 472lbs. Pt weight on admission 1/23 555lbs.      - Volume Status: Transition to pta oral diuretics (bumex 5 mg bid. Diuresed -37 L since admission weight is reduced from 555 lbs to 442 lbs.  - Beta-blocker: Reduced PTA Metoprolol to 50 mg daily--> Reduced now to 25 mg daily on 1/29.    - ACEi/ARB/ARNI: pending Cr, BP, will start Entresto (likely in coming days)  - Continue PTA hydral/isordil for now, plan to discontinue when Entresto started  - SGLT2i: not indicated in setting of reccurent yeast infections  - Rate control: BB as above, started PO Dilt 180 mg BID HR goal <110  -Anticoagulation: CHADSVASC 3, continue PTA apixaban  - Strict I/O, daily weights  - BID BMP while aggressively diuresing, with lyte replacement per procotol   - CORE consult appreciate recommendations   - Lymphedema consult  appreciate recommendations   - Continue isordil 20 mg TID and hydralazine 12.5 mg TID     # Hypothyroidism  # Graves disease s/p radioiodine ablation c/b hypothyroid   TSH 20.4 and free T4 0.82 on admission.   - Endocrine consulted Now signed off.   - Continue levothyroxine 200 mcg M-Sat- 400 mcg on Sunday.  Recheck TFT in a month     # Type II DM now with hypoglycemia   # Morbid obesity  A1c 13.5 on 12/25/2022.   -  Lantus 20 units daily (Currently held)  - Novolog mealtime insulin  1:8g CHO (Held)  - Continue high dose sliding scale  (Currently held   - POCT glucose checks QID   - Hypoglycemia protocol  - PTA Ozempic held while IP     # Right toe wound  Present on admission, right great toe and 3rd toe, dry gangrene, pt unaware how long wounds have been present.   - WOCN following: wound cares to right toe daily, Paint toes with betadine. Be sure to apply over dry eschar. Ok to leave open to air. No soaking feet.  - Vascular consult     # Asthma  - Continue PTA inhalers (levalbuterol and spiriva)     # JYOTI  - Continue BiPAP at night    # HLD  - Continue lipitor 40 mg daily     FEN: 2 gm Sodium, 2L Fluid restriction  Code status: Full  Prophylaxis: PO Eliquis  Isolation: None  Disposition: possible discharge home 5-7 days pending stable volume status, discharge dispo    Patient discussed w/ Dr. Ramon Rose, who agrees with above plan.    Lauren Madrid, APRN, CNP  [facility]  "Center  [address]  [place] [facility] [id number]    Medical Decision Making       60 MINUTES SPENT BY ME on the date of service doing chart review, history, exam, documentation & further activities per the note.      Interval History:  Patient seen in ICU, very emotional regarding still being in ICU even though she no longer needs this level of care. Provided ample time to listen and reflect with patient. At end of conversation she is open to working with therapies and taking prescribed PRN medications to assist in ambulation/movement.   Weight up 7 lbs but still net negative 3.5 L yesterday. Will start PO diuretics today and closely monitor I&O's and blood pressure.     Most recent vital signs:  BP 99/49   Pulse 101   Temp 98.5  F (36.9  C) (Oral)   Resp 22   Ht 1.702 m (5' 7\")   Wt (!) 200.5 kg (442 lb)   SpO2 92%   BMI 69.23 kg/m    Temp:  [97.6  F (36.4  C)-98.7  F (37.1  C)] 98.5  F (36.9  C)  Pulse:  [] 101  Resp:  [16-22] 22  BP: ()/(49-73) 99/49  FiO2 (%):  [30 %] 30 %  SpO2:  [91 %-100 %] 92 %  Wt Readings from Last 2 Encounters:   01/30/23 (!) 200.5 kg (442 lb)   01/01/23 (!) 214.4 kg (472 lb 9.6 oz)       Intake/Output Summary (Last 24 hours) at 1/30/2023 1217  Last data filed at 1/30/2023 1000  Gross per 24 hour   Intake 1770 ml   Output 3775 ml   Net -2005 ml       Physical exam:  General: Pleasant elderly female. Appears comfortable and in no acute distress. Alert and interactive  HEENT: Normocephalic, atraumatic. No scleral icterus or injection  Neck: JVP difficult to assess due to body habitus  CARDIAC: Regular rate and rhythm, no m/r/g appreciated. Peripheral pulses 2+  RESP: Normal work of breathing on room air without use of accessory breathing muscles. Clear to auscultation in all fields. No wheezes, rhonchi or crackles appreciated.  GI: No abdominal distention. Soft and nontender.   EXTREMITIES: Without lower extremity edema. No cyanosis or clubbing. Warm and well perfused. No venous stasis " changes.   SKIN: No acute lesions appreciated. Warm and dry to touch, known wounds to right toes  NEURO: Alert and oriented X3, CN II-XII grossly intact, no focal neurological deficits noted, normal speech  PSYCH: Mood and affect are appropriate    Labs (Past three days):  CBC  Recent Labs   Lab 01/30/23  0429 01/29/23  0843 01/28/23  0311 01/27/23  0502   WBC 10.3 9.5 8.0 8.5   RBC 4.84 5.15 5.14 5.05   HGB 14.1 15.2 15.3 14.9   HCT 46.0 49.7* 50.3* 50.5*   MCV 95 97 98 100   MCH 29.1 29.5 29.8 29.5   MCHC 30.7* 30.6* 30.4* 29.5*   RDW 17.8* 18.4* 18.6* 18.4*    208 213 221     BMP  Recent Labs   Lab 01/30/23  1123 01/30/23  0736 01/30/23  0429 01/30/23  0004 01/29/23  1613 01/29/23  1402 01/29/23  0325 01/29/23  0319 01/28/23 2007 01/28/23 2003 01/28/23  1636 01/28/23  1631 01/28/23  0456 01/28/23 0311   NA  --   --  138  --   --  136  --  143  --  143  --  141   < > 142   POTASSIUM  --   --  3.9  --   --  4.0  --  3.5  --  3.5  --  3.5   < > 4.2   CHLORIDE  --   --  92*  --   --  90*  --  94*  --  94*  --  94*   < > 93*   CO2  --   --  38*  --   --  35*  --  38*  --  40*  --  42*   < > 33*   ANIONGAP  --   --  8  --   --  11  --  11  --  9  --  5*   < > 16*   * 284* 210* 266*   < > 285*   < > 125*   < > 64*   < > 60*   < > 71   BUN  --   --  44.5*  --   --  43.3*  --  49.5*  --  53.0*  --  53.3*   < > 56.1*   CR  --   --  1.25*  --   --  1.11*  --  1.26*  --  1.32*  --  1.28*   < > 1.41*   GFRESTIMATED  --   --  53*  --   --  61  --  52*  --  50*  --  51*   < > 46*   JUSTIN  --   --  9.0  --   --  9.6  --  9.5  --  9.5  --  9.8   < > 9.7   MAG  --   --  2.1  --   --  2.0  --   --   --   --   --  2.2  --  2.2    < > = values in this interval not displayed.     Troponins:   Lab Results   Component Value Date    TROPONIN 0.01 01/05/2013        INR  Recent Labs   Lab 01/25/23  0622 01/24/23  0650   INR 1.44* 1.57*     Liver panel  Recent Labs   Lab 01/29/23  0319 01/28/23  0311 01/27/23  0916  01/24/23  0650 01/23/23  1537   PROTTOTAL 7.5 7.6  --  6.3* 6.8   ALBUMIN 2.9* 3.1*  --  2.7* 3.0*   BILITOTAL 1.4* 1.6*  --  1.4* 1.6*   ALKPHOS 97 103  --  106* 120*   AST 37*  --  17 23 23   ALT 11 11 6* 11 16

## 2023-01-30 NOTE — PROGRESS NOTES
"   01/30/23 1451   Appointment Info   Signing Clinician's Name / Credentials (PT) Mary Lou Summers PT, DPT   Rehab Comments (PT) asha pelaez ordered 1/30 again. discussed with charge on 4C need for double lift room although per chrge, confirmed wtihin wt limit?   Living Environment   People in Home alone  (PCA services 10 hr during day, 4 hr at night)   Current Living Arrangements apartment   Home Accessibility stairs to enter home   Number of Stairs, Main Entrance 4   Stair Railings, Main Entrance railings safe and in good condition;railings on both sides of stairs   Transportation Anticipated family or friend will provide   Living Environment Comments Pt lives in apartment with 4STE, tub shower with bench. Reports PCA available ~14/hrs daily.   Self-Care   Usual Activity Tolerance good   Current Activity Tolerance poor   Regular Exercise No   Equipment Currently Used at Home cane, straight;walker, rolling   Fall history within last six months yes   Number of times patient has fallen within last six months 2   Activity/Exercise/Self-Care Comment Pt requires assistance for majority of ADLs, reprots need some A with mobiity 2/2 urgency with voiding and has diffculty standing from toilet seat.   General Information   Onset of Illness/Injury or Date of Surgery 01/28/23   Referring Physician Sheree Betts, APRN CNP   Patient/Family Therapy Goals Statement (PT) To get home   Pertinent History of Current Problem (include personal factors and/or comorbidities that impact the POC) per EMR \"Pricilla Brown is a 47 year old female with a history of HFmrEF (EF 40-45%) 2/2 NICM, long-standing persistent AF, HTN, type II DM, morbid obesity, asthma, JYOTI and Graves disease s/p radioiodine ablation c/b hypothyroid admitted 1/23/2023 for heart failure exacerbation with hospital course complicated by CO2 retention and being obtunded, transferred to ICU on 1/28/2023 for encephalopathy suspected secondary to hypercapnia in " "setting of aggressive diuresis and contraction alkalosis despite using diamox. Initially transferred for intubation, but patient remained on BiPAP throughout ICU admission. Improved 1/29 after holding diuretics. Saturating well on RA at this time. \"   Existing Precautions/Restrictions cardiac;fall   General Observations Activity: Up ad laly   Cognition   Affect/Mental Status (Cognition) WFL;anxious   Pain Assessment   Patient Currently in Pain Yes, see Vital Sign flowsheet   Integumentary/Edema   Integumentary/Edema Comments mild edema, pt reprots its improving   Posture    Posture Forward head position   Range of Motion (ROM)   Range of Motion ROM deficits secondary to pain;ROM deficits secondary to weakness   Strength (Manual Muscle Testing)   Strength Comments generalized weakness. unable to lift limbs against gravity, tremulous and pain   Bed Mobility   Comment, (Bed Mobility) currently dependent with reposheet/lift   Transfers   Comment, (Transfers) unable to progress 2/2 pain   Gait/Stairs (Locomotion)   Comment, (Gait/Stairs) impaired, unable to initate   Balance   Balance Comments poor sitting balance   Sensory Examination   Sensory Perception Comments hypersensitive. impaired sensation in bottoms of feet   Clinical Impression   Criteria for Skilled Therapeutic Intervention Yes, treatment indicated   PT Diagnosis (PT) impaired functional mobility   Influenced by the following impairments impaired strenght, balance, activity tolerance, pain   Functional limitations due to impairments bed mobility, transfers, gait, stairs   Clinical Presentation (PT Evaluation Complexity) Evolving/Changing   Clinical Presentation Rationale clinical judgement, complex mobility needs   Clinical Decision Making (Complexity) moderate complexity   Planned Therapy Interventions (PT) balance training;bed mobility training;gait training;home exercise program;neuromuscular re-education;patient/family education;ROM (range of " motion);stair training;strengthening;transfer training;wheelchair management/propulsion training;progressive activity/exercise;risk factor education;home program guidelines   Risk & Benefits of therapy have been explained evaluation/treatment results reviewed;care plan/treatment goals reviewed;risks/benefits reviewed;current/potential barriers reviewed;participants voiced agreement with care plan;participants included;patient   PT Total Evaluation Time   PT Eval, Moderate Complexity Minutes (84575) 9   Physical Therapy Goals   PT Frequency 5x/week   PT Predicted Duration/Target Date for Goal Attainment 02/10/23   PT Goals Bed Mobility;Transfers;Gait;Stairs   PT: Bed Mobility Independent   PT: Transfers Modified independent;Sit to/from stand;Bed to/from chair;Assistive device   PT: Gait Modified independent;Assistive device;Standard walker;Rolling walker;50 feet   PT: Stairs Modified independent;4 stairs;Rail on both sides   PT Discharge Planning   PT Plan lift to asha recliner (if available), vs lift to EOB with step under feet, continue strength and range as tolerated. attempted sit <>stands when able   PT Discharge Recommendation (DC Rec) Transitional Care Facility   PT Rationale for DC Rec Pt significantly below baseline, unable to tolerance any OOB due to pain and awaiting appropriate recliner. Currently unsafe to dc home.   PT Brief overview of current status lift with ultra sling.   Total Session Time   Total Session Time (sum of timed and untimed services) 9

## 2023-01-30 NOTE — PLAN OF CARE
Lymphedema Orders received. Chart reviewed and discussed with care team.? IP edema not indicated due to minimal amount of swelling- pt reports swelling has improved and is near baseline. Amount of swelling (1+) is not limiting function nor putting skin at risk of breakdown.? Will complete edema orders, pt was in agreement.

## 2023-01-30 NOTE — PLAN OF CARE
ICU End of Shift Summary. See flowsheets for vital signs and detailed assessment.    Changes this shift: Changed from MICU service to Cards 1. Advanced to regular diet, D20 stopped, and insulin restarted. Labile mood -- pleasant/anxious/crying. AOx4, slept between cares. Keenan with high UOP. Last BM 1/26, stool softeners given.     Plan:  Continue with plan of care. Needs frequent education on fluid restriction/appropriate food choices. Encourage movement and repositioning.       Goal Outcome Evaluation:           Problem: Plan of Care - These are the overarching goals to be used throughout the patient stay.    Goal: Optimal Comfort and Wellbeing  Intervention: Monitor Pain and Promote Comfort  Recent Flowsheet Documentation  Taken 1/29/2023 1600 by Aide Pereira RN  Pain Management Interventions: medication (see MAR)  Taken 1/29/2023 1200 by Aide Pereira RN  Pain Management Interventions: medication (see MAR)  Taken 1/29/2023 0800 by Aide Pereira RN  Pain Management Interventions: declines

## 2023-01-30 NOTE — PLAN OF CARE
ICU End of Shift Summary. See flowsheets for vital signs and detailed assessment.    Changes this shift: No acute events overnight. Able to make there needs known. VSS. Tolerated BiPAP overnight. Large urine output via phan catheter. No BM, pt states passing flatus. Good appetite, needs assistance setting up meal tray but able to eat independently.    [place] [facility] [place]   Goal Outcome Evaluation:     Plan of Care Reviewed With: patient    Overall Patient Progress: improvingOverall Patient Progress: improving    Outcome Evaluation: C tx orders in. alert & able to make needs known. VSS. Good appetite. Voiding, passing flatus.

## 2023-01-31 NOTE — PLAN OF CARE
Goal Outcome Evaluation:      Plan of Care Reviewed With: patient    Overall Patient Progress: improvingOverall Patient Progress: improving    Outcome Evaluation: managing muscle aches, MAP goal changed to 60 per CARDS, Diuresed    ICU End of Shift Summary. See flowsheets for vital signs and detailed assessment.    Changes this shift: Alert and oriented, makes needs known, lots of muscle cramps and generalized pain. Given PRNs. 9 Beat run Vtach, CARDS notified. MAPs low 60s in AM, MAP goal reduced to 60 per CARDS. RA. Diuresed, 3,475cc of urine out for shift. Lymph nurse rounded, no intervention or lymph wraps due to necrotic feet. Worked with PT with range of motion in bed.    Plan: Transfer upstairs when bed available. Manage pain.

## 2023-01-31 NOTE — PLAN OF CARE
ICU End of Shift Summary. See flowsheets for vital signs and detailed assessment.    Changes this shift: Patient stable on intermittent NC-BiPAP usage. Afebrile. Diet changed this shift to reflect T2DM diagnosis. No BM this shift. Auto-diuresis with phan -4.7L negative for the day already.     Plan: Ready to transfer off unit. Continue plan of care, adjust as needed.      Goal Outcome Evaluation:      Plan of Care Reviewed With: patient    Overall Patient Progress: no changeOverall Patient Progress: no change    Outcome Evaluation: Pain management evaluated this shift. Carb restriction placed by care team.

## 2023-01-31 NOTE — PROGRESS NOTES
[facility]   Cardiology   Progress Note     ASSESSMENT/PLAN:  Pricilla Brown is a 47 year old female with a history of HFmrEF (EF 40-45%) 2/2 NICM, long-standing persistent AF, HTN, type II DM, morbid obesity, asthma, JYOTI and Graves disease s/p radioiodine ablation c/b hypothyroid admitted 1/23/2023 for heart failure exacerbation with hospital course complicated by CO2 retention and being obtunded, transferred to ICU on 1/28/2023 for encephalopathy suspected secondary to hypercapnia in setting of aggressive diuresis and contraction alkalosis despite using diamox. Initially transferred for intubation, but patient remained on BiPAP throughout ICU admission. Improved 1/29 after holding diuretics. Now on room air.     Interval History: No acute events overnight. Patient hemodynamically stable, requiring supplemental oxygen via nasal cannula and BiPap at night. Continues to diurese well; net negative 4L out yesterday and weight down 2 lb. Bumex reduced from 5mg BID to 3mg BID to facilitate goal net negative 1-2L/day. Has already voided 4L today; will likely hold PM Bumex dose. EDW difficult to assess; patient 472 when discharged last hospital admission, now 440lb.  PT/OT following and now recommending TCU; request SW assistance with placement.     Changes Today:  - SW to facilitate TCU placement   - Lymphedema consult  - Hold PM Bumex dose goal net negative 1-2 L today   - Pain management consult; decrease gabapentin to BID, stop flexeril, add Robaxin 400 q6hr PRN     # Acute on Chronic Systolic Heart Failure (EF 40-45%)  # Long-Standing Persistent Atrial Fibrllation  # Hypertension   # Troponin Elevation 2/2 CHF  Pt admitted with c/o increasing SOB, MURRAY, chest tightness, weight gain in spite of compliance with PO diuretics. NT BNP 5891 (was 2030 during most recent admission).  Lactic acid 2.2 on admission with improvement to 1.1 with diuresis. Troponin mildly elevated, 67-->75-->60 likely  2/2 CHF exacerbation; no concern for ACS currently. Most recent Echo with EF 40-45%. Coronary angiogram 2012 with no significant CAD. Of note, pt recently admitted 12/25-1/1 HF exacerbation, diuresed to 472lbs. Pt weight on admission 1/23 555lbs.   - Volume Status: PO Bumex 3mg BID. Net negative 4L yesterday; weight 442 --> 440 lb. EDW unknown.   - Beta Blocker: Continue Toprol XL 25mg   - ACEi/ARB/ARNI: Start Entresto one renal function stable; can be outpatient   - Holding PTA Hydralazine & Isordil for now w/ diuresis; anticipate stopping if able to start Entresto   - SGLT2i: Contraindicated with recurrent yeast infections   - Aldosterone Antagonist: Continue PTA Spironolactone 50mg  - Rate Control: BB as above, started PO Dilt 180 mg BID HR goal <110  - Anticoagulation: CHADSVASC 3; Continue PTA apixaban  - Strict I/O & Daily Weights  - Low Na Diet / 2L Fluid Restriction  - Twice daily BMP's with BID diuretic dosing  - CORE consulted; has appointment 2/10    - Lymphedema consult placed; appreciate recommendations      # Graves Disease s/p Radioiodine Ablation  # Hypothyroidism 2/2 Radioiodine Ablation  TSH 20.4 and free T4 0.82 on admission.   - Endocrine consulted, now signed off  - Continue levothyroxine 200 mcg M-Sat- 400 mcg on Sunday.    - Recheck TFT in a month     # Type 2 Diabetes Mellitus  # Morbid obesity  A1c 13.5% on 12/25/2022. Discrepancies in patient's home regimen. Endocrinology consulted during admission; now signed off. Recommendations below:  - Lantus 40 units daily  - Continue Novolog mealtime insulin  1:8g CHO  - Continue high intensity SSI   - POCT glucose checks QID   - Hypoglycemia protocol  - Resume PTA Ozempic at discharge   - Outpatient endocrinology follow up at discharge      # Right Toe Wound  Present on admission, right great toe and 3rd toe, dry gangrene, pt unaware how long wounds have been present.   - WOCN following: wound cares to right toe daily. Paint toes with betadine. Be  sure to apply over dry eschar. Ok to leave open to air. No soaking feet.  - Vascular consulted; JOSETTE's completed showing patent vessels and normal waveforms. They recommend orthopedic foot and ankle consult   - Ortho consulted; plan for outpatient follow up to discuss elective amputation   - Continue with LE elevation     # Asthma  - Continue PTA inhalers (levalbuterol and spiriva)     # JYOTI  - Continue BiPAP at night    # Hyperlipidemia   - Continue lipitor 40 mg daily    # Pain  Patient reporting pain everywhere. Is not interested narcotics for control. Suspect pain could be stemming from aggressive diuresis vs diabetic neuropathy. Interfering with patients ability and willingness to participate in therapy.   - Pain management consult; decrease gabapentin to BID, stop flexeril, add Robaxin 400 q6hr PRN  - PRN Lidocaine patches     FEN: 2 gm Sodium, 2L Fluid restriction  Code status: Full  Prophylaxis: PO Eliquis  Isolation: None  Disposition: Ready for discharge; pending TCU placement     Patient seen and discussed with Dr. Alyl Appiah, who agrees with above plan.    Jennie RUST, CNP  [facility] Cardiology Team    Physical Exam:  Temp:  [99.1  F (37.3  C)-100  F (37.8  C)] 100  F (37.8  C)  Pulse:  [] 95  Resp:  [18-22] 20  BP: ()/(49-80) 112/71  FiO2 (%):  [30 %] 30 %  SpO2:  [89 %-100 %] 100 %    I/O:   Intake/Output Summary (Last 24 hours) at 1/31/2023 0926  Last data filed at 1/31/2023 0800  Gross per 24 hour   Intake 2160 ml   Output 6935 ml   Net -4775 ml       Wt:   Wt Readings from Last 5 Encounters:   01/31/23 (!) 199.6 kg (440 lb)   01/01/23 (!) 214.4 kg (472 lb 9.6 oz)   10/31/22 (!) 201 kg (443 lb 3.2 oz)   10/26/22 (!) 208.6 kg (459 lb 14.4 oz)   06/21/22 (!) 191 kg (421 lb)       General: NAD  HEENT:  PERRLA, EOMI.   Neck: JVD difficult to assess due to body jabitus.   CV: RRR. No murmur appreciated. No rubs or gallops. Peripheral radial pulse intact.  Resp: No increased work of  breathing or use of accessory muscles, breathing comfortably on room air.  Lung sounds clear throughout/bilaterally  Abdomen:  Normal active bowel sounds.  Abdomen is soft. No distension, non-tender to palpation.    Extremities: Warm. Capillary refill less than 3 sec. 3/4 radial pulses bilaterally.  3/4 pedal pulses bilaterally. No pre-tibial edema. No cyanosis or clubbing.  Skin:  Warm and dry. No erythema, rashes, ulceration or diaphoresis.  Neuro: Alert and oriented x3.      Medications:    apixaban ANTICOAGULANT  5 mg Oral BID     atorvastatin  40 mg Oral Daily     bumetanide  3 mg Oral BID     diltiazem ER COATED BEADS  120 mg Oral BID     gabapentin  600 mg Oral TID     heparin lock flush  5-20 mL Intracatheter Q24H     [Held by provider] hydrALAZINE  12.5 mg Oral TID     insulin aspart   Subcutaneous TID w/meals     insulin aspart  1-10 Units Subcutaneous TID AC     insulin aspart  1-7 Units Subcutaneous At Bedtime     insulin glargine  20 Units Subcutaneous Daily     [Held by provider] isosorbide dinitrate  20 mg Oral TID     ketorolac  1 drop Right Eye TID     levothyroxine  200 mcg Oral Once per day on Mon Tue Wed Thu Fri Sat     levothyroxine  400 mcg Oral Weekly     metoprolol succinate ER  25 mg Oral Daily     miconazole   Topical BID     nicotine  1 patch Transdermal Daily    And     nicotine   Transdermal Q8H OPHELIA     nystatin   Topical BID     sennosides  8.6 mg Oral BID     sodium chloride (PF)  10-40 mL Intracatheter Q8H     spironolactone  50 mg Oral Daily     terbinafine   Topical Daily     topiramate  75 mg Oral At Bedtime     umeclidinium  1 puff Inhalation Daily       - MEDICATION INSTRUCTIONS -       ACE/ARB/ARNI NOT PRESCRIBED         Labs:   CMP  Recent Labs   Lab 01/31/23  0756 01/31/23  0355 01/31/23  0221 01/30/23  2225 01/30/23  1932 01/30/23  1635 01/30/23  0736 01/30/23  0429 01/29/23  1613 01/29/23  1402 01/29/23  0325 01/29/23  0319 01/28/23  0456 01/28/23  0311 01/27/23  1409  01/27/23  0916   NA  --  138  --   --   --  139  --  138  --  136  --  143   < > 142   < >  --    POTASSIUM  --  3.9  --   --   --  4.0  --  3.9  --  4.0  --  3.5   < > 4.2   < >  --    CHLORIDE  --  94*  --   --   --  93*  --  92*  --  90*  --  94*   < > 93*   < >  --    CO2  --  37*  --   --   --  36*  --  38*  --  35*  --  38*   < > 33*   < >  --    ANIONGAP  --  7  --   --   --  10  --  8  --  11  --  11   < > 16*   < >  --    * 252* 225* 259*   < > 258*   < > 210*   < > 285*   < > 125*   < > 71   < >  --    BUN  --  37.8*  --   --   --  40.1*  --  44.5*  --  43.3*  --  49.5*   < > 56.1*   < >  --    CR  --  1.34*  --   --   --  1.27*  --  1.25*  --  1.11*  --  1.26*   < > 1.41*   < >  --    GFRESTIMATED  --  49*  --   --   --  52*  --  53*  --  61  --  52*   < > 46*   < >  --    JUSTIN  --  9.4  --   --   --  9.4  --  9.0  --  9.6  --  9.5   < > 9.7   < >  --    MAG  --  1.9  --   --   --  1.9  --  2.1  --  2.0  --   --    < > 2.2  --   --    PROTTOTAL  --   --   --   --   --   --   --   --   --   --   --  7.5  --  7.6  --   --    ALBUMIN  --   --   --   --   --   --   --   --   --   --   --  2.9*  --  3.1*  --   --    BILITOTAL  --   --   --   --   --   --   --   --   --   --   --  1.4*  --  1.6*  --   --    ALKPHOS  --   --   --   --   --   --   --   --   --   --   --  97  --  103  --   --    AST  --   --   --   --   --   --   --   --   --   --   --  37*  --   --   --  17   ALT  --   --   --   --   --   --   --   --   --   --   --  11  --  11  --  6*    < > = values in this interval not displayed.     CBC  Recent Labs   Lab 01/31/23  0355 01/30/23  0429 01/29/23  0843 01/28/23  0311   WBC 10.2 10.3 9.5 8.0   RBC 4.75 4.84 5.15 5.14   HGB 13.8 14.1 15.2 15.3   HCT 46.2 46.0 49.7* 50.3*   MCV 97 95 97 98   MCH 29.1 29.1 29.5 29.8   MCHC 29.9* 30.7* 30.6* 30.4*   RDW 17.6* 17.8* 18.4* 18.6*    206 208 213     INR  Recent Labs   Lab 01/25/23  0622   INR 1.44*     Arterial Blood Gas  Recent Labs   Lab  23  0429 23  0319 23  1631 23  1023   O2PER        Diagnostics:  23 EC/24/23 Echo:  Technically difficult study.Extremely difficult acoustic windows despite the  use of contrast for endcardial border definition.  Left ventricular function is decreased. The ejection fraction is 40-45%  (mildly reduced).  Right ventricular function cannot be assessed due to poor image quality.  IVC diameter >2.1 cm collapsing <50% with sniff suggests a high RA pressure  estimated at 15 mmHg or greater.     This study was compared with the study from 2022. No significant changes  Noted.    23 Right JOSETTE:                                                                  IMPRESSION: Negative study. Triphasic waveforms and adequate  velocities in the right posterior tibial, anterior tibial, and  dorsalis pedis arteries.    No results found for this or any previous visit (from the past 24 hour(s)).    Medical Decision Making     40 MINUTES SPENT BY ME on the date of service doing chart review, history, exam, documentation & further activities per the note.

## 2023-01-31 NOTE — PLAN OF CARE
ICU End of Shift Summary. See flowsheets for vital signs and detailed assessment.    Changes this shift: AOx4. Forgetful. Follows commands. Tylenol for generalized muscle aches. BiPap 30% at HS, otherwise on room air. Afib, HR 90-110s. No BM. Large UOP via phan. Magnesium replaced per protocol.     Plan: Continue with plan of care. Transfer to [facility] pending bed availability. Work with PT/OT.

## 2023-01-31 NOTE — CONSULTS
"Care Management Follow Up    Length of Stay (days): 8    Expected Discharge Date: 02/03/2023     Concerns to be Addressed: Discharge planning       Patient plan of care discussed at interdisciplinary rounds: Yes    Anticipated Discharge Disposition:  Home vs TCU     Anticipated Discharge Services: PT/OT     Anticipated Discharge DME: Mechanical lift, raised toilet seat, lift chair, hospital bed     Patient/family educated on Medicare website which has current facility and service quality ratings:  Pt declined.    Education Provided on the Discharge Plan:  Yes    Private pay costs discussed: Not applicable    Additional Information: MARGO was consulted for discharge planning.     SW met with pt at bedside and presented Medicare Care Compare list for her zip code and recommendation of a TCU stay in the near future. Pricilla reported that the way she sees it, a TCU stay means she is \"going backwards\" and she feels she could do PT/OT at home which would be more helpful for her.    MARGO inquired about how Pricilla would enter her apartment given the four outside stairs. Pt reported to this SW she has already practiced doing stairs with a PT during this hospital stay when she was on a different unit (not true per chart review).    MARGO inquired how pt would get out of bed as she does not have a mechanical lift at home. Pricilla reported she has a , Flores, who helps her obtain equipment and supplies. Pt stated she would also need a hospital bed, raised toilet seat, and a lift chair.    MARGO inquired how to reach Flores to ask if she can start the process of ordering these items. Pricilla unable to provide Flores's phone number but reported her PCA, Hira, may have it.    MARGO placed a call to Hira to get more information on Hira's ability to care for Pricilla in the home and info on Pricilla' community CM. Hira did not answer; MARGO left  with direct dial for return call.     MARGO tried again to reach Hira; still no answer.    MARGO received " call from Celeste at  TCU advising that pt has not been out of bed yet this hospital stay so would not be ready for TCU given that they need to see what progress gets made after a couple of good therapy sessions.     STERLING Salmeron   [facility]   4C  Phone: [phone]

## 2023-02-01 NOTE — PLAN OF CARE
ICU End of Shift Summary. See flowsheets for vital signs and detailed assessment.    Changes this shift: A&Ox4, Forgetful. PERRLA. Follows commands. Complained of generalized pain - PRN Tylenol and Robaxin were given with relief. Afebrile. Afib, HR 70s-90s. Short run of ectopy Bigeminy/Quadrigeminal - Provider notified and EKG ordered. Mg 2.0 - replaced. Maintaining MAPs >60. Maintaining sats >90% on 4LNC. Hooked to BiPap AVAPS 30% overnight. On 60gm CHO, 2 gm NA diet with 2L fluid restriction daily. Large UO via [place]. No BM this shift.     Plan: Transfer off unit pending bed availability. Work with PT/OT. Contact/Notify primary care team with questions or concerns.      Goal Outcome Evaluation:    Problem: Plan of Care - These are the overarching goals to be used throughout the patient stay.    Goal: Absence of Hospital-Acquired Illness or Injury  Intervention: Prevent Skin Injury  Recent Flowsheet Documentation  Taken 1/31/2023 2000 by Fariha Zhang RN  Body Position:    turned    right    heels elevated    legs elevated    side-lying 30 degrees    Problem: Plan of Care - These are the overarching goals to be used throughout the patient stay.    Goal: Optimal Comfort and Wellbeing  Intervention: Monitor Pain and Promote Comfort  Recent Flowsheet Documentation  Taken 1/31/2023 2000 by Fariha Zhang, RN  Pain Management Interventions:    medication (see MAR)    repositioned    rest    food  Intervention: Provide Person-Centered Care  Recent Flowsheet Documentation  Taken 1/31/2023 2000 by Fariha Zhang RN  Trust Relationship/Rapport:    care explained    choices provided    emotional support provided    questions answered    empathic listening provided    questions encouraged    thoughts/feelings acknowledged    reassurance provided

## 2023-02-01 NOTE — CONSULTS
"Brief Diabetes Note:    HPI:  Pricilla Brown is a 47 year old female with a history of HFmrEF (EF 40-45%) 2/2 NICM, long-standing persistent AF, HTN, type II DM, morbid obesity, asthma, JYOTI and Graves disease s/p radioiodine ablation c/b hypothyroid admitted 1/23/2023 for heart failure exacerbation with hospital course complicated by CO2 retention and being obtunded, transferred to ICU on 1/28/2023 for encephalopathy suspected secondary to hypercapnia in setting of aggressive diuresis and contraction alkalosis despite using diamox. Initially transferred for intubation, but patient remained on BiPAP throughout ICU admission. Improved 1/29 after holding diuretics. Now on room air.     Diabetes service has reviewed salient aspects of care, BG trend and insulin plan today.   Acute hyperglycemic, will start IV insulin drip with full consult to follow in AM.    IDS consulted for: \"uncontrolled diabetes BG >350\"      CBC RESULTS: Recent Labs   Lab Test 02/01/23  0343   WBC 8.8   RBC 4.68   HGB 13.6   HCT 45.7   MCV 98   MCH 29.1   MCHC 29.8*   RDW 17.2*        Recent Labs   Lab Test 02/01/23  1130 02/01/23  0736 02/01/23  0348 02/01/23  0343 01/31/23  1930 01/31/23  1642   NA  --   --   --  134*  --  135*   POTASSIUM  --   --   --  4.2  --  4.1   CHLORIDE  --   --   --  91*  --  92*   CO2  --   --   --  34*  --  33*   ANIONGAP  --   --   --  9  --  10   * 395*   < > 357*   < > 245*   BUN  --   --   --  33.5*  --  35.1*   CR  --   --   --  1.22*  --  1.17*   JUSTIN  --   --   --  8.7  --  9.1    < > = values in this interval not displayed.       /63 (BP Location: Left arm, Cuff Size: Adult Regular)   Pulse 105   Temp 98.9  F (37.2  C) (Axillary)   Resp 20   Ht 1.702 m (5' 7\")   Wt (!) 200.9 kg (443 lb)   SpO2 99%   BMI 69.38 kg/m          BG trend:        Assessment:      1) Type II diabetes un controlled (A1c 13.5%) with insulin resistance.  2) Acute persistent hyperglycemia   3) Morbid obesity; BMI " 74  4) LE edema          Recommendations/plan:      -  Start IV insulin now    -   Discontinue lantus orders    -   Novolog 1:8 g cho TID AC/HS meals/snacks    -   BG q 1-2 hours per IV insulin gtt protocol    -   Recommend cho counting protocol    -   Hypoglycemia protocol    -   Full consult to follow in AM.  Please do not hesitate to contact the team with any questions/concerns should they arise prior to full consult tomorrow.      Please notify inpatient diabetes service if changes are planned that will impact glycemia, such as NPO, starting/adjusting steroids, enteral feeding, parenteral feeding, dextrose fluids or procedures.       YOCASTA Bo CNP   Inpatient Diabetes Management Service  Pager - [phone]  Friday - Monday 0800 - 1600 hrs    To contact Endocrine Diabetes service:   From 8AM-4PM: page inpatient diabetes provider that is following the patient that day (see filed or incomplete progress notes/consult notes).  If uncertain of provider assignment: page job code [phone].  For questions or updates from 4PM-8AM: page the diabetes job code for on call fellow: [phone]    Please notify inpatient diabetes service if changes are planned to steroids, nutrition, or if procedures are planned requiring prolonged NPO status.Diabetes Management Team job code: [phone]       I spent a total of 15 minutes on the date of the encounter doing prep/post-work, chart review, history and exam, documentation and further activities per the note including lab review, multidisciplinary communication, counseling the patient and/or coordinating care regarding acute hyper/hypoglycemic management.  See note for details.

## 2023-02-01 NOTE — PLAN OF CARE
ICU End of Shift Summary. See flowsheets for vital signs and detailed assessment.    Changes this shift: Patient remains stable on 4L NC. VSS. Afebrile. Gave one dose labetalol for SBP>180. No issue. LS clear/diminished. No BM this shift, bowel sounds audible and patient passing flatus. : adequate output with diuretics this shift, approx. fluid negative 1.6L at time of writing. PT worked with patient and got to chair this shift, no issues. Insulin gtt started this shift d/t uncontrolled BG >350. Endocrine consult placed by provider. WOC assessed wound to toe per consult placed overnight, no change to current plan.     Plan: Continue plan of care, adjust as needed.      Goal Outcome Evaluation:      Plan of Care Reviewed With: patient    Overall Patient Progress: no changeOverall Patient Progress: no change    Outcome Evaluation: Insulin gtt started for uncontrolled t2dm. Up to chair with PT.

## 2023-02-01 NOTE — CONSULTS
[facility] Nurse Inpatient Assessment     Consulted for:Wound(s): right foot   1/29- New consult- R) hip  2/1 new consult Left foot    Patient History (according to provider note(s):      Pricilla Brown is a 47 year old female with PMHx of HFrEF (EF 20-25%, 12/2022) 2/2 NICM (diagnosed 2012), long-standing persistent AF (on Apxiban), HTN, insulin-dependent type II DM, morbid obesity, asthma, JYOTI and Graves disease s/p radioiodine ablation c/b hypothyroid, who was admitted on 1/23/2023 for ADHF.     Patient was admitted last month (12/25 to 1/1/2023) for ADHF, but after discharge, she started to feel short of breath at rest. She used to be able to get up and move around the house slowly, but now unable to move at all. She also mentioned chest tightness on exertion that only lasts for about 1-2 minutes. Denies fever, chest pain at rest, cough, PND, orthopnea, palpitations, syncope or near-syncope, hemoptysis, nausea, vomiting, abdominal pain, melena, hematochezia, constipation, diarrhea, dysuria, hematuria, headaches, local weakness, numbness/tingling of hands/feet.    Areas Assessed:      Areas visualized during today's visit: Focused: and Lower extremities and hip    Wound location: R) hip    1/29 2/1    Last photo: 1/29  Wound due to: Trauma  Wound history/plan of care: Possible from scratching, noted multiple other areas with evidence of healed tissue  Wound base: 100 % dermis,      Palpation of the wound bed: normal      Drainage: none     Description of drainage: none     Measurements (length x width x depth, in cm): 0.5  x 0.2  x  0.1 cm      Tunneling: N/A     Undermining: N/A  Periwound skin: Intact and two other area with recently epithelialized tissue      Color: pink      Temperature: normal   Odor: none  Pain: denies , none  Pain interventions prior to dressing change: N/A  Treatment goal: Heal  and Protection  STATUS: healing  Supplies ordered:  supplies stored on unit and discussed with RN    Wound location: Right Toes- stable on 1/29    Right lower leg 1/24 2/1    Last photo: 2/1/23  Wound due to: Unknown Etiology wound consistent with poor vasculature or frostbite. Per vascular consult JOSETTE and vasculature are sufficient for wound healing.   Wound history/plan of care: new on this admission. Dry gangrene. Patient is unaware of how long her feet have looked like this and she reports having difficulty fully visualizing her feet. Patient also has neuropathy in the feet.   2/1- edema blistering opening at edge of dry eschar on right foot.   Wound base: 100 % eschar,     Palpation of the wound bed: firm      Drainage: none     Description of drainage: none     Measurements (length x width x depth, in cm): Unable to measure eschar located on great , 3rd, and 4th toes   Periwound skin: Intact      Color: dusky      Temperature: normal   Odor: none  Pain: denies , none  Pain interventions prior to dressing change: N/A  Treatment goal: Keep dry eschar stable to prevent wet gangrene and Protection  STATUS: stable  Supplies ordered: gathered and at bedside       2/1  Left foot, dry cracked not measured wounds small fissure like opening along crease of 1st toe.     Treatment Plan:     Right toes:  Daily  Paint toes with betadine. Be sure to apply over dry eschar. Ok to leave open to air. No soaking feet. Keep toes dry.    Left foot: Daily. Cleanse per unit routine and apply lotion to foot. Do not apply lotion to right foot toes.       R) hip: Every 3 days     Cleanse the area with NS and pat dry.    Apply No sting film barrier to periwound skin.    Cover wound with Mepilex    Change dressing Q 3 days.    Turn and reposition Q 2hrs side to side only.    Ensure pt has Lefty-cushion while sitting up in the chair.  FYI- If pt has constant incontinent loose stools needing dressing changes Q shift please discontinue the Mepilex dressing and apply criticaid barrier paste  BID and PRN.    Orders: Reviewed and Updated    RECOMMEND PRIMARY TEAM ORDER: Lymphedema consult  Education provided: plan of care, wound progress and Infection prevention daily foot inspection.  Discussed plan of care with: Patient and Nurse  WOC nurse follow-up plan: weekly  Notify WOC if wound(s) deteriorate.  Nursing to notify the Provider(s) and re-consult the WOC Nurse if new skin concern.    DATA:     Current support surface: Standard  Standard gel/foam mattress (IsoFlex, Atmos air, etc)  Containment of urine/stool: Indwelling catheter  BMI: Body mass index is 69.38 kg/m .   Active diet order: Orders Placed This Encounter      Combination Diet Moderate Consistent Carb (60 g CHO per Meal) Diet; 2 gm NA Diet     Output: I/O last 3 completed shifts:  In: 2440 [P.O.:2240; I.V.:200]  Out: 7500 [Urine:7500]     Labs:   Recent Labs   Lab 02/01/23  0343 01/29/23  0843 01/29/23  0319   ALBUMIN  --   --  2.9*   HGB 13.6   < >  --    WBC 8.8   < >  --     < > = values in this interval not displayed.     Pressure injury risk assessment:   Sensory Perception: 3-->slightly limited  Moisture: 3-->occasionally moist  Activity: 2-->chairfast  Mobility: 2-->very limited  Nutrition: 4-->excellent  Friction and Shear: 1-->problem  Shane Score: 15      Dept. Pager: [phone]  Dept. Office Number: [phone]

## 2023-02-01 NOTE — PROGRESS NOTES
CLINICAL NUTRITION SERVICES    Reason for Assessment:  Low-sodium (2 g/day) nutrition education    Diet History:  Pt reports no history of receiving low-sodium nutrition education in the past. Pt reports she likes eating out and does use prepackaged foods and salts foods.     Nutrition Diagnosis:  Food- and nutrition-related knowledge deficit r/t no previous knowledge of low-sodium diet AEB pt report of no previous formal low-sodium nutrition education.    Nutrition Prescription/Recs:  Continue low-sodium diet.      Interventions:  Nutrition Education  1. Provided verbal instruction on low-sodium meal planning. Writer and pt identified eating out less, using more fresh foods instead of prepackaged ones, and not using the salt shaker as ways to start working on a lower sodium diet. Pt very receptive and interested in information.   2. Provided the following handout: Low Sodium Nutrition Therapy from [facility] Nutrition Care Manual    Goals:    Pt will verbalize at least five high sodium foods and the importance of avoiding added salt to foods for cooking or seasoning foods.     Follow-up:   Patient to ask any further nutrition-related questions before discharge. In addition, pt may request outpatient RD appointment.    Dona Mera RD, LD  Pager: [phone]

## 2023-02-01 NOTE — PROGRESS NOTES
[facility]   Cardiology   Progress Note     ASSESSMENT/PLAN:  Pricilla Brown is a 47 year old female with a history of HFmrEF (EF 40-45%) 2/2 NICM, long-standing persistent AF, HTN, type II DM, morbid obesity, asthma, JYOTI and Graves disease s/p radioiodine ablation c/b hypothyroid admitted 1/23/2023 for heart failure exacerbation with hospital course complicated by CO2 retention and being obtunded, transferred to ICU on 1/28/2023 for encephalopathy suspected secondary to hypercapnia in setting of aggressive diuresis and contraction alkalosis despite using diamox. Initially transferred for intubation, but patient remained on BiPAP throughout ICU admission. Improved 1/29 after holding diuretics. Now on room air.     Interval History: No acute events overnight. Patient hemodynamically stable. Patient using small amounts of supplemental oxygen throughout the day but weaning well. Denies any shortness of breath, chest pain, palpitations. Continues to diurese well; net negative 5.5L yesterday; weight unchanged. Difficult to determine EDW but renal function remains stable. PT recommending TCU at discharge; patient refusing at this juncture. Per chart review, patient refusing some activities during therapy sessions.  Discussed the need for active participation in therapy in order to build strength and get closer to discharging home.    Changes Today:  - Discontinue Isordil and Hydralazine  - Start 2.5mg Lisinopril  - Magic Mouthwash for mouth irritation     # Acute on Chronic Systolic Heart Failure (EF 40-45%)  # Long-Standing Persistent Atrial Fibrllation  # Hypertension   # Troponin Elevation 2/2 CHF  Pt admitted with c/o increasing SOB, MURRAY, chest tightness, weight gain in spite of compliance with PO diuretics. NT BNP 5891 (was 2030 during most recent admission).  Lactic acid 2.2 on admission with improvement to 1.1 with diuresis. Troponin mildly elevated, 67-->75-->60 likely 2/2 CHF  exacerbation; no concern for ACS currently. Most recent Echo with EF 40-45%. Coronary angiogram 2012 with no significant CAD. Of note, pt recently admitted 12/25-1/1 HF exacerbation, diuresed to 472lbs. Pt weight on admission 1/23 555lbs.   - Volume Status: Hypervolemic; Continue PO Bumex 3mg BID. Goal net negative 1-2L/day  - Beta Blocker: Continue Toprol XL 25mg   - ACEi/ARB/ARNI: Start Lisinopril 2.5mg; can transition to Entresto outpatient if able   - SGLT2i: Contraindicated with recurrent yeast infections   - Aldosterone Antagonist: Continue PTA Spironolactone 50mg  - Rate Control: BB as above, started PO Dilt 180 mg BID HR goal <110  - Anticoagulation: CHADSVASC 3; Continue PTA apixaban  - Strict I/O & Daily Weights  - Low Na Diet / 2L Fluid Restriction  - Twice daily BMP's with BID diuretic dosing  - CORE consulted; has appointment 2/10       # Graves Disease s/p Radioiodine Ablation  # Hypothyroidism 2/2 Radioiodine Ablation  TSH 20.4 and free T4 0.82 on admission.   - Endocrine consulted; recommendations below   - Continue levothyroxine 200 mcg M-Sat- 400 mcg on Sunday.    - Recheck TFT in a month     # Type 2 Diabetes Mellitus  # Morbid obesity  A1c 13.5% on 12/25/2022. Discrepancies in patient's home regimen. Endocrinology consulted during admission; now signed off. Recommendations below:  - Lantus 40 units daily  - Continue Novolog mealtime insulin  1:8g CHO  - Continue high intensity SSI   - POCT glucose checks QID   - Hypoglycemia protocol  - Resume PTA Ozempic at discharge   - Outpatient endocrinology follow up at discharge      # Right Toe Wound  Present on admission, right great toe and 3rd toe, dry gangrene, pt unaware how long wounds have been present.   - WOCN following: wound cares to right toe daily. Paint toes with betadine. Be sure to apply over dry eschar. Ok to leave open to air. No soaking feet.  - Vascular consulted; JOSETTE's completed showing patent vessels and normal waveforms. They  recommend orthopedic foot and ankle consult   - Ortho consulted; plan for outpatient follow up to discuss elective amputation on 2/17/23 at 10:25 with Dr. Oscar Leahy.  - Continue with LE elevation     # Asthma  Patient endorsing mouth irritation w/ inhaler use. Roof of mouth erythematous and appears excoriated but w/o evidence of yeast.   - Continue PTA inhalers (levalbuterol and spiriva)  - Magic mouthwash ordered      # JYOTI  - Continue BiPAP at night    # Hyperlipidemia   - Continue lipitor 40 mg daily     # Pain  Patient reporting pain everywhere. Is not interested narcotics for control. Suspect pain could be stemming from aggressive diuresis vs diabetic neuropathy. Interfering with patients ability and willingness to participate in therapy.   - Pain management curbside consulted 1/31; decrease gabapentin to BID, stop flexeril, add Robaxin 400 q6hr PRN  - PRN Lidocaine patches     FEN: 2 gm Sodium / 2L Fluid restriction  Code status: Full  Prophylaxis: PO Eliquis  Isolation: None  Disposition: Ready for discharge; pending TCU placement      Patient discussed with Dr. Ally Appiah, who agrees with above plan.    Jennie RUST, CNP  [facility] Cardiology Team    Physical Exam:  Temp:  [97.7  F (36.5  C)-99  F (37.2  C)] 98.9  F (37.2  C)  Pulse:  [] 98  Resp:  [18-22] 22  BP: ()/(61-87) 119/72  FiO2 (%):  [30 %] 30 %  SpO2:  [94 %-100 %] 94 %    I/O:   Intake/Output Summary (Last 24 hours) at 2/1/2023 1005  Last data filed at 2/1/2023 0900  Gross per 24 hour   Intake 2575 ml   Output 5950 ml   Net -3375 ml       Wt:   Wt Readings from Last 5 Encounters:   01/31/23 (!) 200.9 kg (443 lb)   01/01/23 (!) 214.4 kg (472 lb 9.6 oz)   10/31/22 (!) 201 kg (443 lb 3.2 oz)   10/26/22 (!) 208.6 kg (459 lb 14.4 oz)   06/21/22 (!) 191 kg (421 lb)       General: NAD  HEENT:  PERRLA, EOMI.   Neck: JVD Difficult to assess due to body habtius.   CV: Irregular rhythm. No murmur appreciated. No rubs or gallops. Peripheral  radial pulse intact.  Resp: No increased work of breathing or use of accessory muscles, breathing comfortably on room air.  Lung sounds clear throughout/bilaterally  Abdomen:  Normal active bowel sounds.  Abdomen is soft. No distension, non-tender to palpation.    Extremities: Warm. Capillary refill less than 3 sec. 3/4 radial pulses bilaterally.  3/4 pedal pulses bilaterally. 1+ pitting pre-tibial edema. No cyanosis or clubbing.  Skin:  Warm and dry. No erythema, rashes, ulceration or diaphoresis.  Neuro: Alert and oriented x3.      Medications:    apixaban ANTICOAGULANT  5 mg Oral BID     atorvastatin  40 mg Oral Daily     bumetanide  3 mg Oral BID     diltiazem ER COATED BEADS  120 mg Oral BID     gabapentin  600 mg Oral BID     heparin lock flush  5-20 mL Intracatheter Q24H     insulin aspart   Subcutaneous TID w/meals     insulin aspart  1-10 Units Subcutaneous TID AC     insulin aspart  1-7 Units Subcutaneous At Bedtime     insulin glargine  40 Units Subcutaneous Daily     ketorolac  1 drop Right Eye TID     levothyroxine  200 mcg Oral Once per day on Mon Tue Wed Thu Fri Sat     levothyroxine  400 mcg Oral Weekly     lidocaine  2 patch Transdermal Q24H     lidocaine   Transdermal Q8H OPHELIA     lisinopril  2.5 mg Oral Daily     metoprolol succinate ER  25 mg Oral Daily     miconazole   Topical BID     nicotine  1 patch Transdermal Daily    And     nicotine   Transdermal Q8H OPHELIA     nystatin   Topical BID     sennosides  8.6 mg Oral BID     sodium chloride (PF)  10-40 mL Intracatheter Q8H     spironolactone  50 mg Oral Daily     terbinafine   Topical Daily     topiramate  75 mg Oral At Bedtime     umeclidinium  1 puff Inhalation Daily       - MEDICATION INSTRUCTIONS -       ACE/ARB/ARNI NOT PRESCRIBED         Labs:   CMP  Recent Labs   Lab 02/01/23  0736 02/01/23  0348 02/01/23  0343 01/31/23  2113 01/31/23  1930 01/31/23  1642 01/31/23  0756 01/31/23  0355 01/30/23  1932 01/30/23  1635 01/29/23  0325  01/29/23 0319 01/28/23  0456 01/28/23 0311 01/27/23  1409 01/27/23  0916   NA  --   --  134*  --   --  135*  --  138  --  139   < > 143   < > 142   < >  --    POTASSIUM  --   --  4.2  --   --  4.1  --  3.9  --  4.0   < > 3.5   < > 4.2   < >  --    CHLORIDE  --   --  91*  --   --  92*  --  94*  --  93*   < > 94*   < > 93*   < >  --    CO2  --   --  34*  --   --  33*  --  37*  --  36*   < > 38*   < > 33*   < >  --    ANIONGAP  --   --  9  --   --  10  --  7  --  10   < > 11   < > 16*   < >  --    * 340* 357* 375*   < > 245*   < > 252*   < > 258*   < > 125*   < > 71   < >  --    BUN  --   --  33.5*  --   --  35.1*  --  37.8*  --  40.1*   < > 49.5*   < > 56.1*   < >  --    CR  --   --  1.22*  --   --  1.17*  --  1.34*  --  1.27*   < > 1.26*   < > 1.41*   < >  --    GFRESTIMATED  --   --  54*  --   --  57*  --  49*  --  52*   < > 52*   < > 46*   < >  --    JUSTIN  --   --  8.7  --   --  9.1  --  9.4  --  9.4   < > 9.5   < > 9.7   < >  --    MAG  --   --  2.0  --   --  2.2  --  1.9  --  1.9   < >  --    < > 2.2  --   --    PROTTOTAL  --   --   --   --   --   --   --   --   --   --   --  7.5  --  7.6  --   --    ALBUMIN  --   --   --   --   --   --   --   --   --   --   --  2.9*  --  3.1*  --   --    BILITOTAL  --   --   --   --   --   --   --   --   --   --   --  1.4*  --  1.6*  --   --    ALKPHOS  --   --   --   --   --   --   --   --   --   --   --  97  --  103  --   --    AST  --   --   --   --   --   --   --   --   --   --   --  37*  --   --   --  17   ALT  --   --   --   --   --   --   --   --   --   --   --  11  --  11  --  6*    < > = values in this interval not displayed.     CBC  Recent Labs   Lab 02/01/23  0343 01/31/23  0355 01/30/23  0429 01/29/23  0843   WBC 8.8 10.2 10.3 9.5   RBC 4.68 4.75 4.84 5.15   HGB 13.6 13.8 14.1 15.2   HCT 45.7 46.2 46.0 49.7*   MCV 98 97 95 97   MCH 29.1 29.1 29.1 29.5   MCHC 29.8* 29.9* 30.7* 30.6*   RDW 17.2* 17.6* 17.8* 18.4*    214 206 208     INRNo lab results  found in last 7 days.  Arterial Blood Gas  Recent Labs   Lab 23  0429 23  0319 23  1631 23  1023   O2PER        Diagnostics:    23 EC/24/23 Echo:  Technically difficult study.Extremely difficult acoustic windows despite the  use of contrast for endcardial border definition.  Left ventricular function is decreased. The ejection fraction is 40-45%  (mildly reduced).  Right ventricular function cannot be assessed due to poor image quality.  IVC diameter >2.1 cm collapsing <50% with sniff suggests a high RA pressure  estimated at 15 mmHg or greater.     This study was compared with the study from 2022. No significant changes  Noted.    23 Right JOSETTE:                                                                  IMPRESSION: Negative study. Triphasic waveforms and adequate  velocities in the right posterior tibial, anterior tibial, and  dorsalis pedis arteries.    No results found for this or any previous visit (from the past 24 hour(s)).    Medical Decision Making     50 MINUTES SPENT BY ME on the date of service doing chart review, history, exam, documentation & further activities per the note.

## 2023-02-01 NOTE — PROGRESS NOTES
CLINICAL NUTRITION SERVICES    Reviewed nutrition risk factors due to LOS. Pt is tolerating diet, eating well per nursing documentation (usually consuming 100% meals per RN flowsheets, had poor intake 1/27-1/29 but now improved back to 100%). Pt reports she has a good appetite but becomes very hungry at night with her stomach growling; she voices frustration that she cannot order food past 6:30pm. She has been ordering smaller portions with breakfast, lunch and dinner d/t diet restrictions, which she understands. Writer offered 8pm snack and pt grateful for option. Wts are highly variable this admit, so difficult to  trends. No other nutrition issues identified at this time. RD will follow via rounds at this time, unless consulted.    Intervention:  Ordered 8pm snack per pt preference:   -1/2 roast beef or turkey sandwich on wheat bread with lettuce, tomato and quinn & a fruit cup (peaches, pears, or grapes - NO canteloupe or banana please). Do not count toward sodium restriction.    Dona Mera, YARY, LD  Pager: [phone]

## 2023-02-02 NOTE — PLAN OF CARE
V/S: VSS, afebrile. SBP 90-100s. HR 80-90s.  Neuro: Pt is A&O x4, No c/o headache & lightheadedness. Numbness & tingling in BUE & BLE (from neuropathy), see flowsheets. Calls appropriately.  Resp: 4L NC, BiPAP at night (FiO2 30%), tolerating well. Sats > 95, No c/o SOB. Lung sounds clear & diminished.  Cardiac: Tele Afib, no c/o symptoms. No c/o chest pain & palpitations.  GI/: BG checks q1h (on insulin drip). 2 g sodium restricted/60 g carb restricted diet, tolerating well. 2L FL. No c/o n/v/d. Voiding adequately via phan catheter. Last BM 1/26/2023, see flowsheets.  Skin: Skin dry & ryne, gangrene on toes on BLE (see flowsheets). No new deficits noted.  Pain: C/o severe generalized pain (mostly in hands, toes/feet, back, & mouth), given x2 tylenol, x2 dilaudid, x1 percocet, & x1 robaxin.  Activity: lift assist in room.  Electrolytes: Labs pending, await results.  LDAs: sacrum covered w/ Mepilex WDL; R hip wound covered & WDL, see flowsheets. Insulin gtt fluctuating (currently @ 4 units/hr on algorithm 3). R PICC removal site WDL. L PIV infusing and WDL.      Will continue to monitor and report changes to team.  Patient currently resting in bed with call light in reach.       Goal Outcome Evaluation:    Problem: Pain Acute  Goal: Optimal Pain Control and Function  Outcome: Not Progressing  Intervention: Develop Pain Management Plan  Recent Flowsheet Documentation  Taken 2/2/2023 0458 by Re Preston RN  Pain Management Interventions: medication (see MAR)  Taken 2/2/2023 0345 by Re Preston RN  Pain Management Interventions: medication (see MAR)  Taken 2/2/2023 0335 by Re Preston RN  Pain Management Interventions: medication (see MAR)  Taken 2/2/2023 0055 by Re Preston RN  Pain Management Interventions: medication (see MAR)  Taken 2/2/2023 0034 by Re Preston, RN  Pain Management Interventions: medication (see MAR)  Taken 2/2/2023 0016 by Re Preston, RN  Pain Management  Interventions:    heat applied    pillow support provided    repositioned    medication (see MAR)  Taken 2/1/2023 2330 by Re Preston, RN  Pain Management Interventions:    pillow support provided    repositioned  Taken 2/1/2023 2300 by Re Preston, RN  Pain Management Interventions:    pillow support provided    repositioned  Intervention: Prevent or Manage Pain  Recent Flowsheet Documentation  Taken 2/1/2023 2300 by Re Preston, RN  Medication Review/Management: medications reviewed

## 2023-02-02 NOTE — PROGRESS NOTES
Admission note    Admitted: 2/1/2023  Transfer from:   Belongings: with patient  Chart & medications: with patient  Double skin check: Re CHAPPELL & Guillermina RN  Family notified: attempted to call, no answer/vociemail

## 2023-02-02 NOTE — PLAN OF CARE
D: Female patient with a history of HFmrEF (EF 40-45%) 2/2 NICM, long-standing persistent AF, HTN, type II DM, morbid obesity, asthma, JYOTI and Graves disease s/p radioiodine ablation c/b hypothyroid admitted 1/23/2023 for heart failure exacerbation with hospital course complicated by CO2 retention and being obtunded, transferred to ICU on 1/28/2023 for encephalopathy suspected secondary to hypercapnia in setting of aggressive diuresis and contraction alkalosis despite using diamox. Initially transferred for intubation, but patient remained on BiPAP throughout ICU admission. Now on room air.     I: Monitored and assessed patient status; nursing cares provided.    A:   Today's Highlights/Changes:    Insulin drip 2 units/hr Algorithm 4 with BS checks Q 2 hr.     Pain managed with dilaudid q 4 hours PRN, tylenol q 6 hrs PRN, and Percocet Q 8 hours.    Pt moved to room, [facility] for lift assist    BM today, moderate via bed pan.     Magnesium 2.0 this am, replaced.     2 Lido patch applied but fell off, free patch period.     Oxygen titrated down to 2 liters today during shift via nasal cannula.     UA collected, waiting results.      PT helped pt to recliner via lift assist.     R PIV saline lock.       Neuro: A&O x 4 calm and cooperative.   Cardiac: Tele Afib, no c/o symptoms. No complaints of SOB or chest pain.   Respiratory: 2 L NC, Bipap at night, tolerating well. Sats greater than 95%. Lung sounds clear and diminished.   GI/: BG checks q 2h on insulin drip. Last BM today, moderate via bed pan.   Diet/Appetite: 2g sodium restricted/ 60 g carb restricted diet, tolerating well. Voided 180 ml via catheter during shift.   Skin: dry and ryne, gangrene on toes on BLE see flow sheets. No new changes noted.   Pain: Severe pain, managed with dilaudid, percocet, robaxin, and tylenol.   Labs/Lytes: Magnesium 2.0, replaced oral.   LDA's: Mepilex removed during BM; R hip wound covered & WDL. Insulin gtt fluctuating (currently @ 2  units/hr on algorithm 4). L PIV infusing and new R PIV saline lock.   Activity: lift assist in new room. Up to recliner during shift.     P: Up to recliner. Per card 1 goal off phan to side assist to commode. Continue pain management.      Rachid Walton RN    Cardiology

## 2023-02-02 NOTE — PROGRESS NOTES
[facility]   Cardiology   Progress Note     ASSESSMENT/PLAN:  Pricilla Brown is a 47 year old female with a history of HFmrEF (EF 40-45%) 2/2 NICM, long-standing persistent AF, HTN, type II DM, morbid obesity, asthma, JYOTI and Graves disease s/p radioiodine ablation c/b hypothyroid admitted 1/23/2023 for heart failure exacerbation with hospital course complicated by CO2 retention and being obtunded, transferred to ICU on 1/28/2023 for encephalopathy suspected secondary to hypercapnia in setting of aggressive diuresis and contraction alkalosis despite using diamox. Initially transferred for intubation, but patient remained on BiPAP throughout ICU admission. Improved 1/29 after holding diuretics. Now on room air.     Interval History: No acute events overnight. Patient hemodynamically stable soft BP's; MAP in the 70's. She is on 1-2 L supplemental O2; weaning encouraged. Diuresis slowing; net negative 1.8L yesterday with stable weight. Slight increase in renal function. May be approaching dry weight but difficult to assess. Will continue PO Bumex today and consider reducing tomorrow pending labs. Patient making progress with PT and was able to bear weight yesterday. Once able to use bedside commode will remove phan. Patient states she is allergic to lisinopril but this was not documented in the chart. Per chart review, patient developed left tongue swelling after taking but was never documented as an allergy.     Changes Today:  - Discontinue lisinopril; add to allergy list. No ARB or ARNi use   - Resume Isordil/Hydral once BP's improve  - Discontinue diltiazem; plan to  increase Toprol XL if needed   - Endocrinology consulted 2/1 afternoon; insulin gtt infusing   - Ongoing PT participation; goal to use bedside commode so Phan can be removed     # Acute on Chronic Systolic Heart Failure (EF 40-45%)  # Long-Standing Persistent Atrial Fibrllation  # Hypertension   # Troponin Elevation  2/2 CHF  Pt admitted with c/o increasing SOB, MURRAY, chest tightness, weight gain in spite of compliance with PO diuretics. NT BNP 5891 (was 2030 during most recent admission).  Lactic acid 2.2 on admission with improvement to 1.1 with diuresis. Troponin mildly elevated, 67-->75-->60 likely 2/2 CHF exacerbation; no concern for ACS currently. Most recent Echo with EF 40-45%. Coronary angiogram 2012 with no significant CAD. Of note, pt recently admitted 12/25-1/1 HF exacerbation, diuresed to 472lbs. Pt weight on admission 1/23 555lbs.   - Volume Status: Likely mildly hyperovlemic; Continue PO Bumex 3mg BID. Goal net negative 1-2L/day. Small bump in renal function; plan for RHC in coming days   - Beta Blocker: Continue Toprol XL 25mg; up-titrate as needed for HR   - ACEi/ARB/ARNI: Discontinue Lisinopril d/t allergy. ARB/ARNi Contraindicated w/ allergy to Lisinopril.   - SGLT2i: Contraindicated with recurrent yeast infections   - Aldosterone Antagonist: Continue PTA Spironolactone 50mg  - Rate Control: BB as above. Discontinue diltiazem 2/2.   - Anticoagulation: CHADSVASC 3; Continue PTA apixaban  - Strict I/O & Daily Weights  - Low Na Diet / 2L Fluid Restriction  - Twice daily BMP's with BID diuretic dosing  - CORE consulted; has appointment 2/10       # Graves Disease s/p Radioiodine Ablation  # Hypothyroidism 2/2 Radioiodine Ablation  TSH 20.4 and free T4 0.82 on admission.   - Endocrine consulted; recommendations below   - Continue levothyroxine 200 mcg M-Sat- 400 mcg on Sunday.    - Recheck TFT in a month     # Type 2 Diabetes Mellitus  # Morbid obesity  A1c 13.5% on 12/25/2022. Discrepancies in patient's home regimen. Endocrinology consulted during admission, signed off, and re-consulted 2/1/23.   - Insulin gtt started 2/1 per endocrinology; appreciate ongoing recommendations   - Lantus 40 units daily; held while on insulin gtt  - Continue Novolog mealtime insulin  1:8g CHO  - Continue high intensity SSI   - POCT  glucose checks QID   - Hypoglycemia protocol  - Resume PTA Ozempic at discharge  - Outpatient endocrinology follow up at discharge      # Right Toe Wound  Present on admission, right great toe and 3rd toe, dry gangrene, pt unaware how long wounds have been present.   - WOCN following: wound cares to right toe daily. Paint toes with betadine. Be sure to apply over dry eschar. Ok to leave open to air. No soaking feet.  - Vascular consulted; JOSETTE's completed showing patent vessels and normal waveforms. They recommend orthopedic foot and ankle consult   - Ortho consulted; plan for outpatient follow up to discuss elective amputation on 2/17/23 at 10:25 with Dr. Oscar Leahy.  - Continue with LE elevation     # Asthma  Patient endorsing mouth irritation w/ inhaler use. Roof of mouth erythematous and appears excoriated but w/o evidence of yeast.   - Continue PTA inhalers (levalbuterol and spiriva)  - Magic mouthwash ordered      # JYOTI  - Continue BiPAP at night    # Hyperlipidemia   - Continue lipitor 40 mg daily     # Pain  Patient reporting pain everywhere. Is not interested narcotics for control. Suspect pain could be stemming from aggressive diuresis vs diabetic neuropathy. Interfering with patients ability and willingness to participate in therapy.   - Pain management curbside consulted 1/31; decrease gabapentin to BID, stop flexeril, add Robaxin 400 q6hr PRN  - PRN Lidocaine patches     FEN: 2 gm Sodium / 2L Fluid restriction  Code status: Full  Prophylaxis: PO Eliquis  Isolation: None  Disposition: Ready for discharge; pending TCU placement      Patient discussed with Dr. Ally Appiah, who agrees with above plan.    Jennie RUST, CNP  [facility] Cardiology Team    Physical Exam:  Temp:  [97.8  F (36.6  C)-99.4  F (37.4  C)] 99.4  F (37.4  C)  Pulse:  [] 85  Resp:  [18-22] 18  BP: ()/() 97/88  FiO2 (%):  [30 %] 30 %  SpO2:  [94 %-100 %] 100 %    I/O:   Intake/Output Summary (Last 24 hours) at  2/2/2023 1023  Last data filed at 2/2/2023 0900  Gross per 24 hour   Intake 1066.63 ml   Output 2420 ml   Net -1353.37 ml       Wt:   Wt Readings from Last 5 Encounters:   01/31/23 (!) 200.9 kg (443 lb)   01/01/23 (!) 214.4 kg (472 lb 9.6 oz)   10/31/22 (!) 201 kg (443 lb 3.2 oz)   10/26/22 (!) 208.6 kg (459 lb 14.4 oz)   06/21/22 (!) 191 kg (421 lb)       General: NAD  HEENT:  PERRLA, EOMI.   Neck: JVD Difficult to assess due to body habtius.   CV: Irregular rhythm. No murmur appreciated. No rubs or gallops. Peripheral radial pulse intact.  Resp: No increased work of breathing or use of accessory muscles, breathing comfortably on room air.  Lung sounds clear throughout/bilaterally  Abdomen:  Normal active bowel sounds.  Abdomen is soft. No distension, non-tender to palpation.    Extremities: Warm. Capillary refill less than 3 sec. 3/4 radial pulses bilaterally.  3/4 pedal pulses bilaterally. 1+ pitting pre-tibial edema. No cyanosis or clubbing.  Skin:  Warm and dry. No erythema, rashes, ulceration or diaphoresis.  Neuro: Alert and oriented x3.        Medications:    apixaban ANTICOAGULANT  5 mg Oral BID     atorvastatin  40 mg Oral Daily     bumetanide  3 mg Oral BID     gabapentin  600 mg Oral BID     heparin lock flush  5-20 mL Intracatheter Q24H     insulin aspart   Subcutaneous TID w/meals     [Held by provider] insulin glargine  40 Units Subcutaneous Daily     levothyroxine  200 mcg Oral Once per day on Mon Tue Wed Thu Fri Sat     levothyroxine  400 mcg Oral Weekly     lidocaine  2 patch Transdermal Q24H     lidocaine   Transdermal Q8H OPHELIA     magnesium oxide  400 mg Oral Q4H     metoprolol succinate ER  25 mg Oral Daily     miconazole   Topical BID     nicotine  1 patch Transdermal Daily    And     nicotine   Transdermal Q8H OPHELIA     nystatin   Topical BID     sennosides  8.6 mg Oral BID     sodium chloride (PF)  10-40 mL Intracatheter Q8H     spironolactone  50 mg Oral Daily     terbinafine   Topical Daily      topiramate  75 mg Oral At Bedtime     umeclidinium  1 puff Inhalation Daily       insulin regular 6 Units/hr (02/02/23 0943)     - MEDICATION INSTRUCTIONS -       ACE/ARB/ARNI NOT PRESCRIBED       sodium chloride         Labs:   [facility]  Recent Labs   Lab 02/02/23  0933 02/02/23  0839 02/02/23  0730 02/02/23  0629 02/02/23  0620 02/01/23  1717 02/01/23  1617 02/01/23  0348 02/01/23  0343 01/31/23  1930 01/31/23  1642 01/29/23  0325 01/29/23  0319 01/28/23  0456 01/28/23  0311 01/27/23  1409 01/27/23  0916   NA  --   --   --   --  132*  --  133*  --  134*  --  135*   < > 143   < > 142   < >  --    POTASSIUM  --   --   --   --  4.9  --  4.5  --  4.2  --  4.1   < > 3.5   < > 4.2   < >  --    CHLORIDE  --   --   --   --  91*  --  92*  --  91*  --  92*   < > 94*   < > 93*   < >  --    CO2  --   --   --   --  30*  --  29  --  34*  --  33*   < > 38*   < > 33*   < >  --    ANIONGAP  --   --   --   --  11  --  12  --  9  --  10   < > 11   < > 16*   < >  --    * 170* 143* 153* 126*   < > 329*   < > 357*   < > 245*   < > 125*   < > 71   < >  --    BUN  --   --   --   --  36.4*  --  32.8*  --  33.5*  --  35.1*   < > 49.5*   < > 56.1*   < >  --    CR  --   --   --   --  1.38*  --  1.14*  --  1.22*  --  1.17*   < > 1.26*   < > 1.41*   < >  --    GFRESTIMATED  --   --   --   --  47*  --  59*  --  54*  --  57*   < > 52*   < > 46*   < >  --    JUSTIN  --   --   --   --  9.0  --  8.9  --  8.7  --  9.1   < > 9.5   < > 9.7   < >  --    MAG  --   --   --   --  2.0  --  2.3  --  2.0  --  2.2   < >  --    < > 2.2  --   --    PROTTOTAL  --   --   --   --   --   --   --   --   --   --   --   --  7.5  --  7.6  --   --    ALBUMIN  --   --   --   --   --   --   --   --   --   --   --   --  2.9*  --  3.1*  --   --    BILITOTAL  --   --   --   --   --   --   --   --   --   --   --   --  1.4*  --  1.6*  --   --    ALKPHOS  --   --   --   --   --   --   --   --   --   --   --   --  97  --  103  --   --    AST  --   --   --   --   --   --   --    --   --   --   --   --  37*  --   --   --  17   ALT  --   --   --   --   --   --   --   --   --   --   --   --  11  --  11  --  6*    < > = values in this interval not displayed.     CBC  Recent Labs   Lab 23  0620 23  0343 23  0355 23  0429   WBC 8.5 8.8 10.2 10.3   RBC 5.07 4.68 4.75 4.84   HGB 15.0 13.6 13.8 14.1   HCT 49.2* 45.7 46.2 46.0   MCV 97 98 97 95   MCH 29.6 29.1 29.1 29.1   MCHC 30.5* 29.8* 29.9* 30.7*   RDW 17.0* 17.2* 17.6* 17.8*    210 214 206     INRNo lab results found in last 7 days.  Arterial Blood Gas  Recent Labs   Lab 23  0429 23  0319 23  1631 23  1023   O2PER        Diagnostics:    23 EC/24/23 Echo:  Technically difficult study.Extremely difficult acoustic windows despite the  use of contrast for endcardial border definition.  Left ventricular function is decreased. The ejection fraction is 40-45%  (mildly reduced).  Right ventricular function cannot be assessed due to poor image quality.  IVC diameter >2.1 cm collapsing <50% with sniff suggests a high RA pressure  estimated at 15 mmHg or greater.     This study was compared with the study from 2022. No significant changes  Noted.    23 Right JOSETTE:                                                                  IMPRESSION: Negative study. Triphasic waveforms and adequate  velocities in the right posterior tibial, anterior tibial, and  dorsalis pedis arteries.    No results found for this or any previous visit (from the past 24 hour(s)).    Medical Decision Making     35 MINUTES SPENT BY ME on the date of service doing chart review, history, exam, documentation & further activities per the note.        Jennie Roper, YOCASTA CNP on 2023 at 10:30 AM

## 2023-02-02 NOTE — PROGRESS NOTES
"SPIRITUAL HEALTH SERVICES  SPIRITUAL ASSESSMENT Progress Note  [facility] ([place] 6C     REFERRAL SOURCE: Follow up    Pt welcomed  visit.  She shared that she was feeling \"discouraged and alone\" when she was in the ICU but had more hope now that she had been transferred to a regular floor.  Pt said she hoped she would continue to heal and get better, and asked for prayer.   provided support and prayer as requested, and let pt know that [facility] would remain available to her.     PLAN: [facility] will continue to follow     Dorene Lopez  Pager: [phone]    "

## 2023-02-02 NOTE — CONSULTS
"New In-Patient Diabetes/Hyperglycemia Management Consult  Pricilla Brown  Age: 47 year old  MRN # [id number]   YOB: 1975    Chief Complaint: acute on chronic heart failure/encephalopathy  Reason for consult: \"uncontrolled diabetes. Blood sugars >350\"  Consult requested by: Jennie Roper APRN CNP    All information gathered via chart review and face-to-face interview and assessment  Additional historian: no  Unique sources of records reviewed :  All records reviewed per Epic     Professional  utilized --> No    History of Present Illness:  Pricilla Brown is a 47 year old female with a history of HFmrEF (EF 40-45%) 2/2 NICM, long-standing persistent AF, HTN, type II DM, morbid obesity, asthma, JYOTI and Graves disease s/p radioiodine ablation c/b hypothyroid admitted 1/23/2023 for heart failure exacerbation with hospital course complicated by CO2 retention and being obtunded, transferred to ICU on 1/28/2023 for encephalopathy suspected secondary to hypercapnia in setting of aggressive diuresis and contraction alkalosis despite using diamox. Initially transferred for intubation, but patient remained on BiPAP throughout ICU admission. Improved 1/29 after holding diuretics. Now on room air.       Patient is known to the IDS from previous hospitalizations, most recently 12/25/2022 - 1/1/2023.     She has also [facility], unfortunately she has not since her last discharge from in-patient and has a hx of no shows to her previous clinic appointments.   Some hx of varied compliance with her outpatient insulin regimen.  Reports she has been more diligent since discharge this time.     Since her last in-patient stay, she reports taking her medications as IDS recommended which was a significant reduction from her previous PTA regimen (see below).  She was seen on day of discharge with the following recommendations:    \"Today in the hospital:  -Glargine 40 units at " "night  -Aspart insulin to carbohydrate ratio of 1:8 with meals and snacks + CS before meals  -Empagliflozin discontinued due to frequent UTIs over the past year  -Glucose checks before meals, bedtime, 2 AM with goal glucose <180 mg/dL     Upon discharge:  -Toujeo 40 units at bedtime  -Aspart 10 units with each meal + additional correction scale insulin as follows based on pre-meal glucose      -140 - 160: + 1 unit     -161 - 180: + 2 units     -181 - 200: + 3 units, and so on (sensitivity of 20 mg/dL)  -Resume Ozempic 1 mg weekly  -Do not resume empagliflozin given history of UTIs in the past 1 year - this can be readdressed as an outpatient at time of Endocrine follow-up\"      Pricilla reports that is what she has been doing outpatient with relatively stable and good results.  Very few severe lows or highs.     Last dose insulin PTA:  Cannot recall exactly  Inpatient regimen at time of consult:  lantus escalating doses to 40 unit(s) with escalating BG control  Novolog 1:8 g cho, sliding scale insulin  - will add IV insulin to regimen as blood sugars are escalating.      BG at time of consult: 414    Unique/Relevant Labs:   Na 129  K 5.3  Bicarb 31  Anion Gap 9  Creat 1.74/GFR 36  Hgb 15.0  Covid - negative   Lactic acid 1.6  1/23/2023  TSH 20.40 free T4 0.82 (endo?)       BG trends:      Today Pricilla is quite weak and groggy.  Not feeling well.  Unable to eat well.  Is struggling to relay what she was doing outpatient but with some prompts is able.     During her last admission, Pricilla experienced profound and prolonged hypoglycemia with escalation of her basal/bolus regimen while diuresing.   Given her fatigue and minimal PO intake, will continue on IV insulin until more stable then plan for transition when appropriate.     She verbalizes understanding.   Needs remain unpredictable and labile.       Orders Placed This Encounter      Combination Diet Moderate Consistent Carb (60 g CHO per Meal) Diet; 2 gm NA " "Diet    [id number] solutions/medications/confounders: getting bumex, large fluid shifts - no steroids, no dextrose fluids.   Planned Procedures/surgeries: none  ELOS:  TBD    Diabetes:  Recent Labs   Lab 02/02/23  0540 02/02/23  0446 02/02/23  0325 02/02/23  0213 02/02/23  0109 02/02/23  0008   * 142* 127* 163* 163* 181*       Diabetes Type:  Type 2 Diabetes  IFEOMA-65 ziyad, c-peptide, islet cell ziyad - none found in Epic review     Diabetes Duration: since at least 2013   Diabetes Control:   Lab Results   Component Value Date    A1C 13.5 12/25/2022    A1C >15.0 07/23/2021    A1C >14.0 08/19/2020    A1C 8.4 10/11/2018    A1C 9.6 05/29/2018    A1C 9.0 04/07/2017    A1C 12.1 03/10/2016       Usual (PTA) Diabetes Regimen:     Medications:     Tuojeo 40 unit(s) once daily at HS  Aspart 10 units with each meal + additional correction scale insulin as follows based on pre-meal glucose      -140 - 160: + 1 unit     -161 - 180: + 2 units     -181 - 200: + 3 units, and so on (sensitivity of 20 mg/dL)    Ozempic 1 mg weekly - she reports she did NOT resume as instructed    BG monitoring frequency:  3-4 times per day  BG trends at home: \"all over\" but mostly between 85 - 300     Historical:   BG monitor: fingerstick  Frequency of checks: 3 times daily  Tuojeo (glargine U300) 225 units once daily  Novolog 100  Units with meals, plus 1/15 correction scale  Trulicity 1mg weekly on Mondays  Jardiance 25 mg daily AM - stopped 2/2 UTI's   (has not tolerated Metformin in the past)    Exercise: mostly sedentary    Ability to [place] Prescribed Regimen: has been independent - barriers to consistent compliance     Diabetes Complications:  retinopathy unk, last dilated eye exam unk  Peripheral neuropathy:  +, on gabapentin   Nephropathy:  +, recent microalbuminuria + worsening    History of DKA:   unk  Able to Detect Hypoglycemia: yes  Safety Kit:  no  Medic Alert:  No     Usual Diabetes Care Provider/CDCES: hx [facility] - no " "visits recently  - scheduled with Dr. Damon - no show on 1/31/2023 (in-patient since 1/23).  TINA Alexander, RN, [facility] - educator    PCP:  Mario Lockhart    Factors impacting IP BG control currently:  Poor intake/minimal movement/stress/agressive diuresis/    Review of Systems:    CC:  \"feeling so tired/achey/just really bad\"  Constitutional:   No fever, no chills  ENT/Mouth:   Hearing at level of conversation, denies hearing changes, no ear pain, no sore throat, no rhinorrhea, no difficulty swallowing  Eyes:  No eye pain, no discharge, no vision changes  CV:   No CP/SOB, no new edema  Resp:  No cough, no wheezing  GI:   mild nausea, no vomiting, ++ constipation - thinks last BM 4-5 days ago, no diarrhea  :  No dysuria, no frequency, no hematuria  Musk:  No joint swelling/pain, No back pain  Skin/heme:   No new rashes/bruises/open areas.  No pruritis  Neuro:   No new weakness, + numbness/tingling, no headache  Psych:   No new anxiety, denies changes/worsening mood concerns  Endocrine:  No polyuria, no polydipsia    Past medical, family and social histories are reviewed and updated.    Past Medical History  Past Medical History:   Diagnosis Date     A-fib (H) 2011    on coumadin since 1/13     Asthma     as a kid     Chest pain 2/1/2017     Chronic anticoagulation for a-fib 2/15/2013    INR's followed by coumadin clinic at      Diabetes mellitus (H) 2012     Diastolic heart failure 2/15/2013     Dilated cardiomyopathy (H) 1/8/2013     HTN (hypertension)      Hyperthyroidism     Graves, s/p I131 1/13, now on prednisone and methimazole     Mixed type age-related cataract, both eyes 4/8/2022     Morbid obesity (H)      Pulmonary embolism (H) 1/12    hospitalized in [place], on lovenox/coumadin for a few months but stopped, hypercoag w/u neg per pt     Sleep apnea     using CPAP       Family History  Family History   Problem Relation Age of Onset     Thyroid Disease Mother         Grave's D     Diabetes Mother  "     Heart Disease Mother      Cerebrovascular Disease Mother         dec. 54 yo     Hypertension Mother      Heart Disease Sister         Heart Murmur     Diabetes Sister      Heart Disease Father         dec in his 30s, MI     Psychotic Disorder Brother         Bipolar     Diabetes Brother      Thyroid Disease Brother         Hyper Thyroid     Heart Disease Brother      Thyroid Disease Sister         Hyper Thyroid     Thyroid Disease Sister         Hyper Thyroid     Thyroid Disease Sister         Mental Health Problems     Thyroid Disease Maternal Aunt      Thyroid Disease Maternal Uncle      Cancer No family hx of      Glaucoma No family hx of      Macular Degeneration No family hx of      Confirms mom/sister/brother has a dx of DM     Social History  Social History     Socioeconomic History     Marital status: Single     Spouse name: None     Number of children: None     Years of education: None     Highest education level: None   Tobacco Use     Smoking status: Light Smoker     Packs/day: 0.25     Years: 13.00     Pack years: 3.25     Types: Cigarettes     Smokeless tobacco: Never     Tobacco comments:     down to 2 cigs a day   Substance and Sexual Activity     Alcohol use: No     Drug use: No     Sexual activity: Yes     Partners: Male     Birth control/protection: Condom   Social History Narrative    Single, no children        Gyn:        Last pap several years ago, no abnormal    No STIs            Patient is single.  She is no longer working.  She used to work in the area of customer service.  She is currently living with her sister in [place], [place].  She has no pets.  Patient has smoked a half pack of cigarettes a day for the past 10 plus years.  She states that she is down to 5 cigarettes a day with the aid of Wellbutrin.  She does not smoke cigars, pipes or chew tobacco.  She has 1 cup of coffee in the morning.  She does not drink alcohol.  Patient does not exercise.      Tobacco:  "yes  Etoh: not currenty  Drugs: no  Marital Status: single  Lives apt in [facility]       Physical Exam:  /71 (BP Location: Right arm)   Pulse 92   Temp 98  F (36.7  C) (Oral)   Resp 20   Ht 1.702 m (5' 7\")   Wt (!) 200.9 kg (443 lb)   SpO2 99%   BMI 69.38 kg/m      General:  Pleasant, unwell appearing - + fatigued   HEENT: NC/AT, PER and anicteric, non-injected, oral mucous membranes moist.   Lungs: non-labored, no cough, no SOB/wheezing  ABD: obese   Skin: warm and dry  Feet: CMS intact - R great toe with cracked skin and peeling/?bullous area to great toe - thickened toenails   MSK: fluid movement of extremities   Lymp: +++ LE edema.   Mental status: Alert, oriented x3, communicating clearly, memory intact.  Psych: calm, appropriate mood, congruent affect.    Laboratory  Recent Labs   Lab Test 02/02/23  0540 02/02/23  0446 02/01/23  1717 02/01/23  1617 02/01/23  0348 02/01/23  0343   NA  --   --   --  133*  --  134*   POTASSIUM  --   --   --  4.5  --  4.2   CHLORIDE  --   --   --  92*  --  91*   CO2  --   --   --  29  --  34*   ANIONGAP  --   --   --  12  --  9   * 142*   < > 329*   < > 357*   BUN  --   --   --  32.8*  --  33.5*   CR  --   --   --  1.14*  --  1.22*   JUSTIN  --   --   --  8.9  --  8.7    < > = values in this interval not displayed.     CBC RESULTS:   Recent Labs   Lab Test 02/02/23  0620   WBC 8.5   RBC 5.07   HGB 15.0   HCT 49.2*   MCV 97   MCH 29.6   MCHC 30.5*   RDW 17.0*          Liver Function Studies -   Recent Labs   Lab Test 01/29/23  0319   PROTTOTAL 7.5   ALBUMIN 2.9*   BILITOTAL 1.4*   ALKPHOS 97   AST 37*   ALT 11       Active Medications  Current Facility-Administered Medications   Medication     acetaminophen (TYLENOL) tablet 650 mg     albuterol (PROVENTIL HFA/VENTOLIN HFA) inhaler     albuterol (PROVENTIL) neb solution 2.5 mg     apixaban ANTICOAGULANT (ELIQUIS) tablet 5 mg     atorvastatin (LIPITOR) tablet 40 mg     benzocaine-menthol (CHLORASEPTIC MAX) 15-10 MG " lozenge 1 lozenge     bisacodyl (DULCOLAX) suppository 10 mg     bumetanide (BUMEX) tablet 3 mg     clotrimazole (LOTRIMIN) 1 % cream     glucose gel 15-30 g    Or     dextrose 50 % injection 25-50 mL    Or     glucagon injection 1 mg     diclofenac (VOLTAREN) 1 % topical gel 2 g     diltiazem ER COATED BEADS (CARDIZEM CD/CARTIA XT) 24 hr capsule 120 mg     gabapentin (NEURONTIN) capsule 600 mg     heparin lock flush 10 UNIT/ML injection 5-20 mL     heparin lock flush 10 UNIT/ML injection 5-20 mL     hydrALAZINE (APRESOLINE) injection 10 mg     HYDROmorphone (DILAUDID) injection 0.2 mg     hydrOXYzine (ATARAX) tablet 25 mg    Or     hydrOXYzine (ATARAX) tablet 50 mg     insulin 1 unit/mL in saline (NovoLIN, HumuLIN Regular) drip - ADULT IV Infusion     insulin aspart (NovoLOG) injection (RAPID ACTING)     insulin aspart (NovoLOG) injection (RAPID ACTING)     [Held by provider] insulin glargine (LANTUS PEN) injection 40 Units     labetalol (NORMODYNE/TRANDATE) injection 10 mg     levothyroxine (SYNTHROID/LEVOTHROID) tablet 200 mcg     levothyroxine (SYNTHROID/LEVOTHROID) tablet 400 mcg     Lidocaine (LIDOCARE) 4 % Patch 2 patch     lidocaine patch in PLACE     lisinopril (ZESTRIL) tablet 2.5 mg     magic mouthwash suspension (diphenhydramine, lidocaine, aluminum-magnesium & simethicone)     methocarbamol (ROBAXIN) tablet 500 mg     metoprolol succinate ER (TOPROL XL) 24 hr tablet 25 mg     miconazole (MICATIN) 2 % cream     naloxone (NARCAN) injection 0.2 mg    Or     naloxone (NARCAN) injection 0.4 mg    Or     naloxone (NARCAN) injection 0.2 mg    Or     naloxone (NARCAN) injection 0.4 mg     nicotine (NICODERM CQ) 14 MG/24HR 24 hr patch 1 patch    And     nicotine Patch in Place     nystatin (MYCOSTATIN) cream     ondansetron (ZOFRAN ODT) ODT tab 4 mg     oxyCODONE-acetaminophen (PERCOCET) 5-325 MG per tablet 1 tablet     Patient is already receiving anticoagulation with heparin, enoxaparin (LOVENOX), warfarin  (COUMADIN)  or other anticoagulant medication     Reason ACE/ARB/ARNI order not selected     sennosides (SENOKOT) tablet 8.6 mg     simethicone (MYLICON) chewable tablet 80 mg     sodium chloride (PF) 0.9% PF flush 10-20 mL     sodium chloride (PF) 0.9% PF flush 10-20 mL     sodium chloride (PF) 0.9% PF flush 10-40 mL     sodium chloride 0.9% infusion     spironolactone (ALDACTONE) tablet 50 mg     terbinafine (lamISIL) 1 % cream     topiramate (TOPAMAX) tablet 75 mg     umeclidinium (INCRUSE ELLIPTA) 62.5 MCG/ACT inhaler 1 puff     Facility-Administered Medications Ordered in Other Encounters   Medication     sodium chloride (PF) 0.9% PF flush 10 mL     No current outpatient medications on file.       Assessment:      1)  Type II diabetes c/b insulin resistance, diabetic neuropathy/nephropathy; sub optimal control (A1c 13.5%)  2)  Acute persistent hyperglycemia   3)  Labile blood sugars   4)  Morbid obesity; BMI 74  5)  LE edema        Plan:      -   IV insulin per adult non-DKA protocol     -   Novolog 1:8 g cho TID AC/HS meals/snacks    -   BG q 1-2 hours per IV insulin gtt protocol    -   Recommend Podiatry for diab foot exam      -   PTA med: not taking Ozempic but should resume outpatient     -   Hypoglycemia protocol    -   Recommend carb counting protocol    -   Education :  TBD     -   Outpatient follow up:  recommend [facility]      -  Please do not hesitate to contact the team with any questions/concerns.     -  Please notify inpatient diabetes service if changes are planned that will impact glycemia, such as NPO, starting/adjusting steroids, enteral feeding, parenteral feeding, dextrose fluids or procedures.       -  Thank you for this consult, IDS will follow    YOCASTA Bo CNP   Inpatient Diabetes Management Service  Pager - [phone]      To contact Endocrine Diabetes service:   From 7 AM-5 PM: page inpatient diabetes provider who is following the patient that day (see filed or  incomplete progress notes/consult notes).  If uncertain of provider assignment: page job code 3 *'s,  [phone] then enter [phone].  For questions or updates from 5PM-7AM: page the diabetes job code for on call fellow: [phone]    Please notify inpatient diabetes service if changes are planned to steroids, nutrition, or if procedures are planned requiring prolonged NPO status.Diabetes Management Team job code: [phone]      I spent a total of 100 minutes on the date of the encounter doing prep/post-work, chart review, history and exam, documentation and further activities per the note including lab review, multidisciplinary communication, counseling the patient and/or coordinating care regarding acute hyper/hypoglycemic management, pharmacy consultation and discharge planning/referrals.  See note for details.

## 2023-02-02 NOTE — PROGRESS NOTES
Transferred to: (6C) at (2240)  Status at time of transfer: On contiuous monitor, on bed, VSS, on Oxygen 4 LPM, with BiPAP and chair.   Belongings: with patient (cell phone in pt hand)   [place] removed? (if no, why?): No, order placed, strict I/O w/ no alternative   Chart and medications: with patient  Family notified: Attempted to call, no answer/no voicemail

## 2023-02-03 NOTE — PROGRESS NOTES
Care Coordinator  D/I: PT recs TCU and pt agrees today. Pt informed Trice: PT, that she weighs herself at home but only noticed weight gain 3 days before admission.  A: TCU recs pt 400lbs   We need to verify what lbs her home scale goes up to as we have diuresed off 75lbs! She is 437 lbs today  P: I did a Google search for Bariatric scales and:    Physician Scales   Physician Scales offer precision and easy operation for weight monitoring in professional health and medical settings. Digital and mechanical options are available.  Mechanical  Physician Scales Mechanical, sorted by Capacity, ascending  Capacity  Weighing Units  Weighing Surface Width  Weighing Surface Depth  Scale Graduations  Brand  [employer]  200kg/440 lb  kg; lb  10-3/4 in  14-3/4 in  100g/4 oz  [employer] WEIGHING SYSTEMS  $351.31    We may need her Medicaid cm to approve the scale____  IF she goes home she may need other equipment.    6C sp, Frank HAYNES, working on TCU referrals that accept 400+lbs pt.

## 2023-02-03 NOTE — PROVIDER NOTIFICATION
Called CARDS-1 team regarding patient's lab results (sodium was 129 potassium was 5.0), and no changes to care are being made at this time, continue to monitor.

## 2023-02-03 NOTE — PROGRESS NOTES
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 Initially transferred for intubation, but patient remained on BiPAP throughout ICU admission. Improved 1/29 after holding diuretics. Now on room air.        Assessment:      1)  Type II diabetes c/b insulin resistance, diabetic neuropathy/nephropathy; sub optimal control (A1c 13.5%)  2)  Acute persistent hyperglycemia   3)  Labile blood sugars   4)  Morbid obesity; BMI 74  5)  LE edema        Plan:       -   IV insulin per adult non-DKA protocol -> planning for transition off drip this AM - rates are close to her PTA and consistent     -   Lantus 40 unit(s) at 0800     -   Lantus TBD at 2000 if trending > 250 mg/dL addm:  Add Lantus 10 unit(s) for 2000      -   Novolog 1:8 g cho TID AC/HS meals/snacks    -   BG q 1-2 hours per IV insulin gtt protocol - then TID AC/HS/02    -   Novolog high resistance sliding scale insulin to start at 1200 when off drip and add 0200 correction   -  When  eating more consistent may need increase to 1/20 resistance     -   Recommend Podiatry for diab foot exam      -   PTA med: not taking Ozempic but should resume outpatient     -   Hypoglycemia protocol    -   Recommend carb counting protocol    -   Education :  TBD - not anticipated     -   Outpatient follow up:  recommend [facility] - needs to re-schedule with Dr. Silva Damon       Plan discussed with patient,  "bedside RN/primary team via this note.           Interval History:     The last 24 hours progress and nursing notes reviewed.      BG trend:  Stable on drip - averaging 2.45 unit(s) per hour on drip over the past 12 hour window.  Eating ok.  Will plan for transition off drip this AM.  Transitioned conservatively given her past with precipitous drops when receiving aggressive diuresis and prolonged severe hypoglycemia. If BG > 250 this evening, can add additional lantus.                 Stable off drip - does have + lability so could go wither way with BG control - have seen her BG both plummet with her PTA dosing and also have it be insufficient.         Prciilla is feeling better today so intake will likely be much more.   May need to add more cho coverage or increase sliding scale insulin   May need to add a PRN IV insulin option   Will monitor.     Planned Procedures/surgeries: none  D5W-containing solutions/medications: none    Recent Labs   Lab 02/03/23  0550 02/03/23  0444 02/03/23  0439 02/03/23  0342 02/03/23  0242 02/03/23  0147   * 143* 148* 124* 153* 165*           Nutrition:    Orders Placed This Encounter      Combination Diet Moderate Consistent Carb (60 g CHO per Meal) Diet; 2 gm NA Diet        PTA Regimen:      Tuojeo 40 unit(s) once daily at HS  Aspart 10 units with each meal + additional correction scale insulin as follows based on pre-meal glucose      -140 - 160: + 1 unit     -161 - 180: + 2 units     -181 - 200: + 3 units, and so on (sensitivity of 20 mg/dL)     Ozempic 1 mg weekly - she reports she did NOT resume as instructed     BG monitoring frequency:  3-4 times per day  BG trends at home: \"all over\" but mostly between 85 - 300      Historical:   BG monitor: fingerstick  Frequency of checks: 3 times daily  Tuojeo (glargine U300) 225 units once daily  Novolog 100  Units with meals, plus 1/15 correction scale  Trulicity 1mg weekly on Mondays  Jardiance 25 mg daily AM - stopped 2/2 UTI's " "  (has not tolerated Metformin in the past)          Review of Systems:   CC: \"feeling so much better today - stomach is fine.          Medications:   Steroid planning:  no  Tube Feeding: no       Physical Exam:   /87   Pulse 91   Temp 98  F (36.7  C) (Oral)   Resp 20   Ht 1.702 m (5' 7\")   Wt (!) 198.2 kg (437 lb)   SpO2 92%   BMI 68.44 kg/m    General:  Pleasant, unwell appearing - less fatigued   HEENT: NC/AT, PER and anicteric, non-injected, oral mucous membranes moist.   Lungs: non-labored, no cough, less SOB, no wheezing  ABD: obese   Skin: warm and dry  Feet: CMS intact - R great toe with cracked skin and peeling/?bullous area to great toe - thickened toenails   MSK: fluid movement of extremities   Lymp: +++ LE edema.   Mental status: Alert, oriented x3, communicating clearly, memory intact.  Psych: calm, appropriate mood, congruent affect.            Data:     Lab Results   Component Value Date    A1C 13.5 12/25/2022    A1C >15.0 07/23/2021    A1C >14.0 08/19/2020    A1C 8.4 10/11/2018    A1C 9.6 05/29/2018    A1C 9.0 04/07/2017    A1C 12.1 03/10/2016        CBC RESULTS:   Recent Labs   Lab Test 02/03/23  0444   WBC 7.5   RBC 4.87   HGB 14.2   HCT 47.4*   MCV 97   MCH 29.2   MCHC 30.0*   RDW 16.8*        Recent Labs   Lab Test 02/03/23  0550 02/03/23  0444 02/03/23  0242 02/03/23  0147   NA  --  131*  --  129*   POTASSIUM  --  4.9  --  5.0   CHLORIDE  --  90*  --  89*   CO2  --  31*  --  27   ANIONGAP  --  10  --  13   * 143*   < > 165*   BUN  --  48.2*  --  44.2*   CR  --  1.57*  --  1.57*   JUSTIN  --  8.7  --  8.2*    < > = values in this interval not displayed.     Liver Function Studies -   Recent Labs   Lab Test 01/29/23  0319   PROTTOTAL 7.5   ALBUMIN 2.9*   BILITOTAL 1.4*   ALKPHOS 97   AST 37*   ALT 11     Lab Results   Component Value Date    INR 1.44 01/25/2023    INR 1.57 01/24/2023    INR 1.36 12/25/2022    INR 0.99 08/19/2020    INR 1.94 10/29/2019    INR 3.49 07/23/2019 "    INR 2.29 06/26/2019    INR 3.02 03/18/2019    INR 2.42 03/13/2019    INR 1.21 02/19/2019    INR 3.70 01/07/2019    INR 3.24 11/30/2018    INR 2.13 10/30/2018    INR 2.99 10/24/2018    INR 2.18 10/11/2018         YOCASTA Schwarz CNP   Inpatient Diabetes Management Service  Pager - [phone]  Available on [place]     To contact Endocrine Diabetes service:   From 8AM-4PM: page inpatient diabetes provider who is following the patient that day (see filed or incomplete progress notes/consult notes).  If uncertain of provider assignment: page job code [phone]. (To page job code in-house dial 3 stars, [phone] then enter number).  For questions or updates AFTER HOURS from 4PM-8AM: page the diabetes job code for on call fellow: [phone]    Please notify inpatient diabetes service if changes are planned to steroids, nutrition, or if procedures are planned requiring prolonged NPO status.Diabetes Management Team job code: [phone]      I spent a total of 35 minutes on the date of the encounter doing prep/post-work, chart review, history and exam, documentation and further activities per the note including lab review, multidisciplinary communication, counseling the patient and/or coordinating care regarding acute hyper/hypoglycemic management.  See note for details.

## 2023-02-03 NOTE — PROVIDER NOTIFICATION
Roughly 2 hours after new phan catheter insertion urine had a bright red tinge in phan bag, no blood clots noted. I called [facility] provider and they said that it may have been due to catheter insertion and no changes to care will be made at this time. Will continue to monitor urine characteristics & I&Os closely.

## 2023-02-03 NOTE — PROGRESS NOTES
"SPIRITUAL HEALTH SERVICES  SPIRITUAL ASSESSMENT Progress Note  [facility] 6C     REFERRAL SOURCE: Follow up    Pt reported being in better spirits today than yesterday.  She said that she advocated for herself and her needs with her providers and was glad she was able to speak her mind.  She spends time in prayer and said that prayer is very helpful to her, giving her strength as she navigates her health challenges.  She also has a couple of supportive family members.  Pt said she feels sometimes as though she is \"moving backwards\" in her health or that \"its one obstacle after another,\" but maintains hope that things can improve.   offered prayer and support.     PLAN: [facility] will remain available     Dorene Lopez  Pager: [phone]    "

## 2023-02-03 NOTE — PROGRESS NOTES
[facility]   Cardiology   Progress Note     ASSESSMENT/PLAN:  Pricilla Brown is a 47 year old female with a history of HFmrEF (EF 40-45%) 2/2 NICM, long-standing persistent AF, HTN, type II DM, morbid obesity, asthma, JYOTI and Graves disease s/p radioiodine ablation c/b hypothyroid admitted 1/23/2023 for heart failure exacerbation with hospital course complicated by CO2 retention and being obtunded, transferred to ICU on 1/28/2023 for encephalopathy suspected secondary to hypercapnia in setting of aggressive diuresis and contraction alkalosis despite using diamox. Initially transferred for intubation, but patient remained on BiPAP throughout ICU admission. Improved 1/29 after holding diuretics. Now on room air.     Interval History: No acute events overnight. Patient hemodynamically stable, requiring minimal supplemental o2. Suspect patient is near dry weight; will hold PM bumex dose. Plan for R heart cath Monday if patient still here. She still prefers to discharge home but its agreeable to TCU discharge if physical strength is not quite there at the time of TCU availability. Hold Eliquis Sunday.     Changes Today:  - Hold afternoon bumex dose; patient near euvolemic   - Plan for RHC Monday if still here; hold Eliquis Sunday     # Acute on Chronic Systolic Heart Failure (EF 40-45%)  # Long-Standing Persistent Atrial Fibrllation  # Hypertension   # Troponin Elevation 2/2 CHF  Pt admitted with c/o increasing SOB, MURRAY, chest tightness, weight gain in spite of compliance with PO diuretics. NT BNP 5891 (was 2030 during most recent admission).  Lactic acid 2.2 on admission with improvement to 1.1 with diuresis. Troponin mildly elevated, 67-->75-->60 likely 2/2 CHF exacerbation; no concern for ACS currently. Most recent Echo with EF 40-45%. Coronary angiogram 2012 with no significant CAD. Of note, pt recently admitted 12/25-1/1 HF exacerbation, diuresed to 472lbs. Pt weight on admission 1/23  555lbs.   - Volume Status: Suspect near euvolemic; hold afternoon Bumex. Plan for RHC Monday if patient still here. Will need to hold eliquis Sunday.   - Beta Blocker: Continue Toprol XL 25mg; up-titrate as needed for HR   - ACEi/ARB/ARNI: Discontinue Lisinopril d/t allergy. ARB/ARNi contraindicated w/ allergy to Lisinopril.   - SGLT2i: Contraindicated with recurrent yeast infections   - Aldosterone Antagonist: Continue PTA Spironolactone 50mg  - Rate Control: BB as above. Discontinue diltiazem 2/2.   - Anticoagulation: CHADSVASC 3; Continue PTA apixaban  - Strict I/O & Daily Weights  - Low Na Diet / 2L Fluid Restriction  - Twice daily BMP's with BID diuretic dosing  - CORE consulted; has appointment 2/10. Should discuss CardioMEMS at this appointment.      # Chronic Kidney Disease Stage 3b  Longstanding history of diabetes and HTN. Baseline Creatinine appears to be around 1.2-1.4. Creatinine on admission 1.56.   - Twice daily BMP's  - Avoid nephrotoxic agents  - Creatinine 1.57 (1.57)  - Holding PM Bumex dose     # Graves Disease s/p Radioiodine Ablation  # Hypothyroidism 2/2 Radioiodine Ablation  TSH 20.4 and free T4 0.82 on admission.   - Endocrine consulted; recommendations below   - Continue levothyroxine 200 mcg M-Sat- 400 mcg on Sunday.    - Recheck TFT in a month     # Type 2 Diabetes Mellitus  # Morbid obesity  A1c 13.5% on 12/25/2022. Discrepancies in patient's home regimen. Endocrinology consulted during admission, signed off, and re-consulted 2/1/23.   - Insulin gtt started 2/1 per endocrinology; appreciate ongoing recommendations   - Lantus 40 units daily; held while on insulin gtt (while inpatient)  - Continue Novolog mealtime insulin  1:8g CHO(while inpatient)  - Continue high intensity SSI   - POCT glucose checks QID   - Hypoglycemia protocol  - Resume PTA Ozempic at discharge  - Discharge recommendations per endocrinology; Toujeo 40 at bedtime, aspart 10u w/ each meal + additional coverage based on  pre-meal glucose (140-160) + 1 unit, (161-180) + 2 units, (181-200) + 3 units, and resume Ozempic 1mg weekly.     # Right Toe Wound  Present on admission, right great toe and 3rd toe, dry gangrene, pt unaware how long wounds have been present.   - WOCN following: wound cares to right toe daily. Paint toes with betadine. Be sure to apply over dry eschar. Ok to leave open to air. No soaking feet.  - Vascular consulted; JOSETTE's completed showing patent vessels and normal waveforms. They recommend orthopedic foot and ankle consult   - Ortho consulted; plan for outpatient follow up to discuss elective amputation on 2/17/23 at 10:25 with Dr. Oscar Leahy.  - Continue with LE elevation     # Asthma  Patient endorsing mouth irritation w/ inhaler use. Roof of mouth erythematous and appears excoriated but w/o evidence of yeast.   - Continue PTA inhalers (levalbuterol and spiriva)  - Magic mouthwash ordered      # JYOTI  - Continue BiPAP at night    # Hyperlipidemia   - Continue lipitor 40 mg daily     # Pain  Patient reporting pain everywhere. Is not interested narcotics for control. Suspect pain could be stemming from aggressive diuresis vs diabetic neuropathy. Interfering with patients ability and willingness to participate in therapy.   - Pain management curbside consulted 1/31; decrease gabapentin to BID, stop flexeril, add Robaxin 400 q6hr PRN  - PRN Lidocaine patches     FEN: 2 gm Sodium / 2L Fluid restriction  Code status: Full  Prophylaxis: PO Eliquis  Isolation: None  Disposition: Ready for discharge; pending TCU placement      Patient discussed with Dr. Ally Appiah, who agrees with above plan.    Jennie RUST, CNP  [facility] Cardiology Team    Physical Exam:  Temp:  [97.5  F (36.4  C)-99.4  F (37.4  C)] 97.6  F (36.4  C)  Pulse:  [69-92] 86  Resp:  [18-20] 18  BP: ()/(67-88) 113/81  FiO2 (%):  [30 %] 30 %  SpO2:  [99 %-100 %] 100 %    I/O:   Intake/Output Summary (Last 24 hours) at 2/3/2023 5125  Last data filed  at 2/3/2023 0615  Gross per 24 hour   Intake 1423.56 ml   Output 4169 ml   Net -2745.44 ml       Wt:   Wt Readings from Last 5 Encounters:   02/03/23 (!) 198.2 kg (437 lb)   01/01/23 (!) 214.4 kg (472 lb 9.6 oz)   10/31/22 (!) 201 kg (443 lb 3.2 oz)   10/26/22 (!) 208.6 kg (459 lb 14.4 oz)   06/21/22 (!) 191 kg (421 lb)     General: NAD  HEENT:  PERRLA, EOMI.   Neck: JVD Difficult to assess due to body habtius.   CV: Irregular rhythm. No murmur appreciated. No rubs or gallops. Peripheral radial pulse intact.  Resp: No increased work of breathing or use of accessory muscles, breathing comfortably on room air.  Lung sounds clear throughout/bilaterally  Abdomen:  Normal active bowel sounds.  Abdomen is soft. No distension, non-tender to palpation.    Extremities: Warm. Capillary refill less than 3 sec. 3/4 radial pulses bilaterally.  3/4 pedal pulses bilaterally. 1+ pitting pre-tibial edema. No cyanosis or clubbing.  Skin:  Warm and dry. No erythema, rashes, ulceration or diaphoresis.  Neuro: Alert and oriented x3.    Medications:    apixaban ANTICOAGULANT  5 mg Oral BID     atorvastatin  40 mg Oral Daily     bumetanide  3 mg Oral BID     gabapentin  600 mg Oral BID     heparin lock flush  5-20 mL Intracatheter Q24H     insulin aspart  1-10 Units Subcutaneous TID AC     insulin aspart  1-7 Units Subcutaneous At Bedtime     insulin aspart   Subcutaneous TID w/meals     insulin glargine  40 Units Subcutaneous Daily     levothyroxine  200 mcg Oral Once per day on Mon Tue Wed Thu Fri Sat     levothyroxine  400 mcg Oral Weekly     lidocaine  2 patch Transdermal Q24H     lidocaine   Transdermal Q8H OPHELIA     metoprolol succinate ER  25 mg Oral Daily     miconazole   Topical BID     nicotine  1 patch Transdermal Daily    And     nicotine   Transdermal Q8H [facility]     nitroFURantoin macrocrystal-monohydrate  100 mg Oral Q12H [facility] (08/20)     polyethylene glycol  17 g Oral Daily     sennosides  8.6 mg Oral BID     sodium chloride (PF)   10-40 mL Intracatheter Q8H     sodium zirconium cyclosilicate  10 g Oral Once     spironolactone  50 mg Oral Daily     topiramate  75 mg Oral At Bedtime     umeclidinium  1 puff Inhalation Daily       insulin regular 2 Units/hr (02/03/23 0642)     - MEDICATION INSTRUCTIONS -       ACE/ARB/ARNI NOT PRESCRIBED       sodium chloride         Labs:   [facility]  Recent Labs   Lab 02/03/23  0640 02/03/23  0550 02/03/23  0444 02/03/23  0439 02/03/23  0242 02/03/23  0147 02/02/23  1650 02/02/23  1602 02/02/23  0629 02/02/23  0620 01/29/23  0325 01/29/23  0319 01/28/23  0456 01/28/23  0311 01/27/23  1409 01/27/23  0916   NA  --   --  131*  --   --  129*  --  129*  --  132*   < > 143   < > 142   < >  --    POTASSIUM  --   --  4.9  --   --  5.0  --  5.3  --  4.9   < > 3.5   < > 4.2   < >  --    CHLORIDE  --   --  90*  --   --  89*  --  89*  --  91*   < > 94*   < > 93*   < >  --    CO2  --   --  31*  --   --  27  --  31*  --  30*   < > 38*   < > 33*   < >  --    ANIONGAP  --   --  10  --   --  13  --  9  --  11   < > 11   < > 16*   < >  --    * 142* 143* 148*   < > 165*   < > 93   < > 126*   < > 125*   < > 71   < >  --    BUN  --   --  48.2*  --   --  44.2*  --  42.2*  --  36.4*   < > 49.5*   < > 56.1*   < >  --    CR  --   --  1.57*  --   --  1.57*  --  1.74*  --  1.38*   < > 1.26*   < > 1.41*   < >  --    GFRESTIMATED  --   --  40*  --   --  40*  --  36*  --  47*   < > 52*   < > 46*   < >  --    JUSTIN  --   --  8.7  --   --  8.2*  --  9.0  --  9.0   < > 9.5   < > 9.7   < >  --    MAG  --   --  2.3  --   --  2.2  --  2.1  --  2.0   < >  --    < > 2.2  --   --    PROTTOTAL  --   --   --   --   --   --   --   --   --   --   --  7.5  --  7.6  --   --    ALBUMIN  --   --   --   --   --   --   --   --   --   --   --  2.9*  --  3.1*  --   --    BILITOTAL  --   --   --   --   --   --   --   --   --   --   --  1.4*  --  1.6*  --   --    ALKPHOS  --   --   --   --   --   --   --   --   --   --   --  97  --  103  --   --    AST  --   --    --   --   --   --   --   --   --   --   --  37*  --   --   --  17   ALT  --   --   --   --   --   --   --   --   --   --   --  11  --  11  --  6*    < > = values in this interval not displayed.     CBC  Recent Labs   Lab 23  0444 23  0620 23  0343 23  0355   WBC 7.5 8.5 8.8 10.2   RBC 4.87 5.07 4.68 4.75   HGB 14.2 15.0 13.6 13.8   HCT 47.4* 49.2* 45.7 46.2   MCV 97 97 98 97   MCH 29.2 29.6 29.1 29.1   MCHC 30.0* 30.5* 29.8* 29.9*   RDW 16.8* 17.0* 17.2* 17.6*    229 210 214     INRNo lab results found in last 7 days.  Arterial Blood Gas  Recent Labs   Lab 23  0429 23  0319 23  1631 23  1023   O2PER 30 30 30 30       Diagnostics:  23 EC/24/23 Echo:  Technically difficult study.Extremely difficult acoustic windows despite the  use of contrast for endcardial border definition.  Left ventricular function is decreased. The ejection fraction is 40-45%  (mildly reduced).  Right ventricular function cannot be assessed due to poor image quality.  IVC diameter >2.1 cm collapsing <50% with sniff suggests a high RA pressure  estimated at 15 mmHg or greater.     This study was compared with the study from 2022. No significant changes  Noted.    23 Right JOSETTE:                                                                  IMPRESSION: Negative study. Triphasic waveforms and adequate  velocities in the right posterior tibial, anterior tibial, and  dorsalis pedis arteries.    No results found for this or any previous visit (from the past 24 hour(s)).    Medical Decision Making     40 MINUTES SPENT BY ME on the date of service doing chart review, history, exam, documentation & further activities per the note.        Jennie Roper, YOCASTA CNP on 2/3/2023 at 10:14 AM

## 2023-02-03 NOTE — PROGRESS NOTES
"/87   Pulse 91   Temp 98  F (36.7  C) (Oral)   Resp 20   Ht 1.702 m (5' 7\")   Wt (!) 198.2 kg (437 lb)   SpO2 92%   BMI 68.44 kg/m       Neuro: A&Ox4.   Cardiac: Afebrile, VSS.   Respiratory: 2L NC  GI/: Keenan with good UOP. No BM this shift.   Diet/appetite: Tolerating diet. Denies nausea   Activity: Up with lift/ turn and repo  Pain: Agreeable with current regimen.   Skin: No new deficits noted.  Lines: PIV   Drains: Keenan  No new complaints.Will continue to monitor and follow plan of care.         "

## 2023-02-03 NOTE — PLAN OF CARE
"V/S: VSS, afebrile. SBP 90-110s. HR 80-90s.  Neuro: Pt is A&O x4, No c/o headache & lightheadedness. Numbness & tingling in BUE & BLE (from neuropathy), see flowsheets. Calls appropriately.  Resp: 2L NC, BiPAP at night (FiO2 30%), tolerating well. Sats > 93, No c/o SOB. Lung sounds clear & diminished.  Cardiac: Tele Afib, no c/o symptoms. No c/o chest pain & palpitations.  GI/: BG checks q1h (on insulin drip). Diet just changed to NPO d/t procedure, see notes. 2L FL. No c/o n/v/d. Voiding adequately via new phan catheter (placed 2/2/23). Last BM 2/3//2023, see flowsheets.  Skin: Skin dry & ryne, gangrene on toes on BLE (see flowsheets). No new deficits noted.  Pain: C/o severe generalized pain, given x1 voltaren gel, x2 dilaudid, x1 percocet, & x1 robaxin.  Activity: lift assist in room.  Electrolytes: No replacements needed, recheck in am.  LDAs: Sacrum WDL; R hip wound covered & WDL, see flowsheets. Insulin gtt fluctuating (currently @ 2 units/hr on algorithm 2). L lower PIV infusing and WDL. L upper PIV SL & WDL.     Will continue to monitor and report changes to team.  Patient currently resting in bed with call light in reach.       Goal Outcome Evaluation:    Problem: Plan of Care - These are the overarching goals to be used throughout the patient stay.    Goal: Plan of Care Review  Description: The Plan of Care Review/Shift note should be completed every shift.  The Outcome Evaluation is a brief statement about your assessment that the patient is improving, declining, or no change.  This information will be displayed automatically on your shift note.  Outcome: Progressing  Goal: Patient-Specific Goal (Individualized)  Description: You can add care plan individualizations to a care plan. Examples of Individualization might be:  \"Parent requests to be called daily at 9am for status\", \"I have a hard time hearing out of my right ear\", or \"Do not touch me to wake me up as it startles me\".  Outcome: " Progressing  Goal: Absence of Hospital-Acquired Illness or Injury  Outcome: Progressing  Intervention: Identify and Manage Fall Risk  Recent Flowsheet Documentation  Taken 2/2/2023 2030 by Re Preston RN  Safety Promotion/Fall Prevention:    supervised activity    safety round/check completed    room organization consistent    patient and family education    nonskid shoes/slippers when out of bed    fall prevention program maintained    clutter free environment maintained    activity supervised  Intervention: Prevent Skin Injury  Recent Flowsheet Documentation  Taken 2/2/2023 2030 by Re Preston RN  Body Position:    weight shifting    legs elevated  Intervention: Prevent and Manage VTE (Venous Thromboembolism) Risk  Recent Flowsheet Documentation  Taken 2/2/2023 2030 by Re Preston RN  VTE Prevention/Management: SCDs (sequential compression devices) off  Goal: Optimal Comfort and Wellbeing  Outcome: Progressing  Intervention: Monitor Pain and Promote Comfort  Recent Flowsheet Documentation  Taken 2/3/2023 0215 by Re Preston RN  Pain Management Interventions: medication (see MAR)  Taken 2/3/2023 0150 by Re Preston RN  Pain Management Interventions: medication (see MAR)  Taken 2/2/2023 2356 by Re Preston RN  Pain Management Interventions: medication (see MAR)  Taken 2/2/2023 2310 by Re Preston RN  Pain Management Interventions: medication (see MAR)  Taken 2/2/2023 2200 by Re Preston RN  Pain Management Interventions: medication (see MAR)  Taken 2/2/2023 2140 by Re Preston RN  Pain Management Interventions: medication (see MAR)  Taken 2/2/2023 2020 by Re Preston RN  Pain Management Interventions:    repositioned    pillow support provided  Intervention: Provide Person-Centered Care  Recent Flowsheet Documentation  Taken 2/2/2023 2030 by Re Preston RN  Trust Relationship/Rapport:    care explained    choices provided    emotional support  provided    empathic listening provided    questions answered    questions encouraged    reassurance provided    thoughts/feelings acknowledged  Goal: Readiness for Transition of Care  Outcome: Progressing     Problem: Diabetes Comorbidity  Goal: Blood Glucose Level Within Targeted Range  Outcome: Progressing     Problem: Pain Acute  Goal: Optimal Pain Control and Function  Outcome: Progressing  Intervention: Develop Pain Management Plan  Recent Flowsheet Documentation  Taken 2/3/2023 0215 by Re Preston RN  Pain Management Interventions: medication (see MAR)  Taken 2/3/2023 0150 by Re Preston RN  Pain Management Interventions: medication (see MAR)  Taken 2/2/2023 2356 by Re Preston RN  Pain Management Interventions: medication (see MAR)  Taken 2/2/2023 2310 by Re Preston RN  Pain Management Interventions: medication (see MAR)  Taken 2/2/2023 2200 by Re Preston RN  Pain Management Interventions: medication (see MAR)  Taken 2/2/2023 2140 by Re Preston RN  Pain Management Interventions: medication (see MAR)  Taken 2/2/2023 2020 by Re Preston RN  Pain Management Interventions:    repositioned    pillow support provided  Intervention: Prevent or Manage Pain  Recent Flowsheet Documentation  Taken 2/2/2023 2030 by Re Preston RN  Medication Review/Management: medications reviewed     Problem: Risk for Delirium  Goal: Optimal Coping  Outcome: Progressing  Intervention: Optimize Psychosocial Adjustment to Delirium  Recent Flowsheet Documentation  Taken 2/2/2023 2030 by Re Preston RN  Family/Support System Care: involvement promoted  Goal: Improved Behavioral Control  Outcome: Progressing  Intervention: Minimize Safety Risk  Recent Flowsheet Documentation  Taken 2/2/2023 2030 by Re Preston RN  Enhanced Safety Measures: room near unit station  Trust Relationship/Rapport:    care explained    choices provided    emotional support provided    empathic listening  provided    questions answered    questions encouraged    reassurance provided    thoughts/feelings acknowledged  Goal: Improved Attention and Thought Clarity  Outcome: Progressing  Goal: Improved Sleep  Outcome: Progressing

## 2023-02-03 NOTE — PROGRESS NOTES
Major shift changed:    *Lowest Blood glucose was 93 and insulin gtt turned off per Algorithm and endocrine and card 1 notified.   Insulin gtt restarted on Algorithm 2 at 1600 as blood glucose rechecked QHR trending back up 108> 125. Endocrine- Pedro Uriarte want to keep insulin gtt continuous and blood glucose check QHR even though pt has 4 consecutive goal BS  100-150s.  * Secondary PIV ordered.   * Pt moved to room that has lift.   * Continue carb count -insulin sliding scale.   * Pt had 3 soft stool today.   * Pt up to recliner with lift. Please see rehab notes why patient can't stand-up today.   * Dilaudid. Percocet, Tylenol, and robaxan PRN given with good pain relief.   * Phan removed- Urine sent with UA positive- PO antibiotic started and given today.   * Bumex PO and pt had decreased urine output with average 25cc/hr- Pedro Card 1 and Alejandro Card 1 both notified. Bladder scan post phan removed was 41cc and Card 1 notified. Card one want to replace new phan catheter.    * K was 5.2 and card 1 notified and placed recheck at 2200 tonight.    Plan: Replace phan, recheck electrolytes, monitor I/O closely. Maybe restart IV diuretic or right heart cath. Patient need re-weight when get back in bed from recliner tonight. Pt hasn't eating dinner yet so Re CHAPPELL will cover insulin carb when pt finish eating.

## 2023-02-03 NOTE — PROGRESS NOTES
Care Management Follow Up    Length of Stay (days): 11    Expected Discharge Date: 02/04/2023     Concerns to be Addressed: discharge planning     Patient plan of care discussed at interdisciplinary rounds: Yes    Anticipated Discharge Disposition: TCU     Anticipated Discharge Services:  TCU therapy needs  Anticipated Discharge DME:  n/a    Patient/family educated on Medicare website which has current facility and service quality ratings:  yes  Education Provided on the Discharge Plan:  yes  Patient/Family in Agreement with the Plan:  yes    Referrals Placed by CM/SW:    DENIALS  2/3--- [facility] (no bed until Tuesday, SW to re-send referral then)  2/3--- [facility] (not able to meet bariatric needs)  2/3--- [facility] (not able to meet bariatric needs)    PENDING  [id number] [place]  [address]  Phone: [phone]  Fax: [phone]    [facility]  [address] [place], [place] [address] [address]  Phone: [phone]  Admissions: [phone]   Fax: [phone]    [facility]  [address]  [address]  Phone: [phone]  Fax: [phone]  - 2/3: Pt needs to be actively participating in therapies  Private pay costs discussed: Not applicable    Additional Information:  SW is following for discharge planning.    Pt is med ready and is needing TCU placement. A major barrier to TCU placement is the pt's bariatric needs (437 lbs on 2/3) and many TCUs don't take pts at that weight (many don't even take 300 lbs).     ___________________    DANIEL Villagomez, LGSW  6C   [facility]  Phone: [phone]  Pager: [phone]

## 2023-02-04 NOTE — PLAN OF CARE
Pricilla Brown is a 47 year old female with a history of HFmrEF (EF 40-45%) 2/2 NICM, long-standing persistent AF, HTN, type II DM, morbid obesity, asthma, JYOTI and Graves disease s/p radioiodine ablation c/b hypothyroid admitted 1/23/2023 for heart failure exacerbation with hospital course complicated by CO2 retention and being obtunded, transferred to ICU on 1/28/2023 for encephalopathy suspected secondary to hypercapnia in setting of aggressive diuresis and contraction alkalosis despite using diamox. Initially transferred for intubation, but patient remained on BiPAP throughout ICU admission. Improved 1/29 after holding diuretics.    Temp: 97.8  F (36.6  C) Temp src: Oral BP: 107/75 Pulse: 97   Resp: 18 SpO2: 100 % O2 Device: None (Room air) Oxygen Delivery: 1.5 LPM       Neuro: AOx4  Cardiac: Afib, often Tachy  Resp: NC at 1.5 lpm, sating near 100%  GI/: Good urine output w/phan, had BM this shift  Endo: BG is not controlled well with current insulin coverage, ACHS and carb coverage  Diet: Consistent carb (60 g) and 2g Na  Activity: needs lift, was able to stand with PT  Pain: Occasional breakthrough pain but mostly well controlled w/prn robaxin, percocet, tylenol, and occasionally dilaudid  Skin: See woc notes  LDA's: 2 left PIV SL, Phan catheter    Plan: Continue to monitor and contact Cards 1 with any changes/concerns

## 2023-02-04 NOTE — PLAN OF CARE
"Care from: 0700 - 1930    Admitted: 1/23/2023 for heart failure exacerbation with hospital course complicated by CO2 retention and being obtunded, transferred to ICU on 1/28/2023 for encephalopathy suspected secondary to hypercapnia in setting of aggressive diuresis and contraction alkalosis despite using diamox. Initially transferred for intubation, but patient remained on BiPAP throughout ICU admission. Improved 1/29 after holding diuretics.    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 VSS with persistent a-fib, intermittently tyachy. Assist of 2 with a lift. Sats 100*% on RA - 1.5L NC for comfort and when sleeping. Denies chest pains, some dyspnea on exertion. LS clear/dimished. Strict I/Os with adequate urine out put through phan and LBM 2/3. Tolerates Consistent carb (60 g) and 2g Na diet, eating well. Pain managed with PRN Percocet x1. Tylenol x2, Roboxin x2, Dilaudid x1, simethicone (gas pain) x1, and throat lozenges x2. BLE are peeling/scaly, black. MAR Ordered cream placed with betadine cleanse wound order. Overall dry skin, sacral dressing and hip mepilex. 2 LPIV - SL    /67 (BP Location: Left arm, Cuff Size: Adult Large)   Pulse 112   Temp 97.4  F (36.3  C) (Oral)   Resp 17   Ht 1.702 m (5' 7\")   Wt (!) 195 kg (430 lb)   SpO2 100%   BMI 67.35 kg/m      Plan: Continue to monitor Pt status and report changes to Cards 1 treatment team. Encourage activity and self-care. Most recent BG was 425, recheck after 10u + 6u carb coverage insulin, was 322 - provider notified. No new orders currently.    Problem: Plan of Care - These are the overarching goals to be used throughout the patient stay.    Goal: Readiness for Transition of Care  Outcome: Progressing     Problem: Risk for Delirium  Goal: Optimal Coping  Outcome: Progressing     Problem: Risk for " Delirium  Goal: Improved Sleep  Outcome: Progressing   Goal Outcome Evaluation:      Plan of Care Reviewed With: patient    Overall Patient Progress: improvingOverall Patient Progress: improving

## 2023-02-04 NOTE — PROGRESS NOTES
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 Initially transferred for intubation, but patient remained on BiPAP throughout ICU admission. Improved 1/29 after holding diuretics. Now on room air.     Changes Today:  - Plan for RHC Monday; hold Eliquis Sunday     # Acute on Chronic Systolic Heart Failure (EF 40-45%)  # Long-Standing Persistent Atrial Fibrllation  # Hypertension   # Troponin Elevation 2/2 CHF  Pt admitted with c/o increasing SOB, MURRAY, chest tightness, weight gain in spite of compliance with PO diuretics. NT BNP 5891 (was 2030 during most recent admission).  Lactic acid 2.2 on admission with improvement to 1.1 with diuresis. Troponin mildly elevated, 67-->75-->60 likely 2/2 CHF exacerbation; no concern for ACS currently. Most recent Echo with EF 40-45%. Coronary angiogram 2012 with no significant CAD. Of note, pt recently admitted 12/25-1/1 HF exacerbation, diuresed to 472lbs. Pt weight on admission 1/23 555lbs.   - Volume Status: volume status improving, difficult to assess with body habitus, suspect approach euvolemia. Plan for RHC Monday. Will need to hold eliquis Sunday.   - Beta Blocker: Continue Toprol XL 25mg; up-titrate as needed for HR   - ACEi/ARB/ARNI: Discontinue Lisinopril d/t allergy. ARB/ARNi contraindicated w/ allergy to Lisinopril.   - SGLT2i: Contraindicated with recurrent yeast infections   - Aldosterone Antagonist: Continue PTA  Spironolactone 50mg  - Rate Control: BB as above. Discontinue diltiazem 2/2 HF.   - Anticoagulation: CHADSVASC 3; Continue PTA apixaban  - Strict I/O & Daily Weights  - Low Na Diet / 2L Fluid Restriction  - Twice daily BMP's with BID diuretic dosing  - CORE consulted; has appointment 2/10. Should discuss CardioMEMS at this appointment.      # Chronic Kidney Disease Stage 3b  Longstanding history of diabetes and HTN. Baseline Creatinine appears to be around 1.2-1.4. Creatinine on admission 1.56.   - Twice daily BMP's  - Avoid nephrotoxic agents  - Creatinine 1.28 today    # Graves Disease s/p Radioiodine Ablation  # Hypothyroidism 2/2 Radioiodine Ablation  TSH 20.4 and free T4 0.82 on admission.   - Endocrine consulted; recommendations below   - Continue levothyroxine 200 mcg M-Sat- 400 mcg on Sunday.    - Recheck TFT in a month     # Type 2 Diabetes Mellitus  # Morbid obesity  A1c 13.5% on 12/25/2022. Discrepancies in patient's home regimen. Endocrinology consulted during admission, signed off, and re-consulted 2/1/23.   - Insulin gtt started 2/1 per endocrinology; appreciate ongoing recommendations and transition back to subcutaneous lantus and novolog on 2/3/23.   - Lantus  increased per endocrinology to 50 units daily  - Continue Novolog mealtime insulin  1:8g CHO(while inpatient)  - Continue high intensity SSI   - POCT glucose checks QID   - Hypoglycemia protocol  - Resume PTA Ozempic at discharge  - Discharge recommendations per endocrinology; Toujeo 40 at bedtime, aspart 10u w/ each meal + additional coverage based on pre-meal glucose (140-160) + 1 unit, (161-180) + 2 units, (181-200) + 3 units, and resume Ozempic 1mg weekly.     # Right Toe Wound  Present on admission, right great toe and 3rd toe, dry gangrene, pt unaware how long wounds have been present.   - WOCN following: wound cares to right toe daily. Paint toes with betadine. Be sure to apply over dry eschar. Ok to leave open to air. No soaking  feet.  - Vascular consulted; JOSETTE's completed showing patent vessels and normal waveforms. They recommend orthopedic foot and ankle consult   - Ortho consulted; plan for outpatient follow up to discuss elective amputation on 2/17/23 at 10:25 with Dr. Oscar Leahy.  - Continue with LE elevation     # Asthma  Patient endorsing mouth irritation w/ inhaler use. Roof of mouth erythematous and appears excoriated but w/o evidence of yeast.   - Continue PTA inhalers (levalbuterol and spiriva)  - Magic mouthwash ordered      # JYOTI  - Continue BiPAP at night    # Hyperlipidemia   - Continue lipitor 40 mg daily     # Pain  Patient reporting pain everywhere. Is not interested narcotics for control. Suspect pain could be stemming from aggressive diuresis vs diabetic neuropathy. Interfering with patients ability and willingness to participate in therapy.   - Pain management curbside consulted 1/31; decrease gabapentin to BID, stop flexeril, add Robaxin 400 q6hr PRN  - PRN Lidocaine patches     FEN: 2 gm Sodium / 2L Fluid restriction  Code status: Full  Prophylaxis: PO Eliquis  Isolation: None  Disposition: Pending RHC and TCU placement      =================================================================  Interval History: No acute events overnight. Patient hemodynamically stable, requiring minimal supplemental o2.  Net negative 5.75L in last 24 hours. VSS. She is agreeable to TCU discharge now. Planning for RHC on Monday.     Physical Exam:  Temp:  [97.4  F (36.3  C)-98.3  F (36.8  C)] 97.8  F (36.6  C)  Pulse:  [] 97  Resp:  [16-20] 18  BP: ()/(65-87) 107/75  SpO2:  [92 %-100 %] 100 %    I/O:   Intake/Output Summary (Last 24 hours) at 2/3/2023 0747  Last data filed at 2/3/2023 0615  Gross per 24 hour   Intake 1423.56 ml   Output 4169 ml   Net -2745.44 ml       Wt:   Wt Readings from Last 5 Encounters:   02/04/23 (!) 195 kg (430 lb)   01/01/23 (!) 214.4 kg (472 lb 9.6 oz)   10/31/22 (!) 201 kg (443 lb 3.2 oz)    10/26/22 (!) 208.6 kg (459 lb 14.4 oz)   06/21/22 (!) 191 kg (421 lb)     General: NAD  HEENT:  PERRLA, EOMI.   Neck: JVD Difficult to assess due to body habtius.   CV: Irregular rhythm. No murmur appreciated. No rubs or gallops. Peripheral radial pulse intact.  Resp: No increased work of breathing or use of accessory muscles, breathing comfortably on room air.  Lung sounds clear throughout/bilaterally  Abdomen:  Normal active bowel sounds.  Abdomen is soft. No distension, non-tender to palpation.    Extremities: Warm. Capillary refill less than 3 sec. 3/4 radial pulses bilaterally.  3/4 pedal pulses bilaterally. 1+ pitting pre-tibial edema. No cyanosis or clubbing.  Skin:  Warm and dry. No erythema, rashes, ulceration or diaphoresis.  Neuro: Alert and oriented x3.    Medications:    apixaban ANTICOAGULANT  5 mg Oral BID     atorvastatin  40 mg Oral Daily     bumetanide  3 mg Oral BID     gabapentin  600 mg Oral BID     heparin lock flush  5-20 mL Intracatheter Q24H     insulin aspart  1-10 Units Subcutaneous TID AC     insulin aspart  1-7 Units Subcutaneous BID     insulin aspart   Subcutaneous TID w/meals     insulin glargine  10 Units Subcutaneous QPM     insulin glargine  40 Units Subcutaneous Daily     levothyroxine  200 mcg Oral Once per day on Mon Tue Wed Thu Fri Sat     levothyroxine  400 mcg Oral Weekly     lidocaine  2 patch Transdermal Q24H     lidocaine   Transdermal Q8H [facility]     metoprolol succinate ER  25 mg Oral Daily     miconazole   Topical BID     nicotine  1 patch Transdermal Daily    And     nicotine   Transdermal Q8H [facility]     nitroFURantoin macrocrystal-monohydrate  100 mg Oral Q12H [facility] (08/20)     polyethylene glycol  17 g Oral Daily     sennosides  8.6 mg Oral BID     sodium chloride (PF)  10-40 mL Intracatheter Q8H     spironolactone  50 mg Oral Daily     topiramate  75 mg Oral At Bedtime     umeclidinium  1 puff Inhalation Daily       - MEDICATION INSTRUCTIONS -       ACE/ARB/ARNI NOT  PRESCRIBED       sodium chloride         Labs:   CMP  Recent Labs   Lab 23  2148 23  1817 23  0550 23  0242 23  0147 23  0325 23   *  --   --  130*  --  131*  --  129*   < > 143   POTASSIUM 4.5  --   --  4.6  --  4.9  --  5.0   < > 3.5   CHLORIDE 92*  --   --  89*  --  90*  --  89*   < > 94*   CO2 30*  --   --  32*  --  31*  --  27   < > 38*   ANIONGAP 10  --   --  9  --  10  --  13   < > 11   * 271* 333* 318*   < > 143*   < > 165*   < > 125*   BUN 49.2*  --   --  47.3*  --  48.2*  --  44.2*   < > 49.5*   CR 1.28*  --   --  1.38*  --  1.57*  --  1.57*   < > 1.26*   GFRESTIMATED 51*  --   --  47*  --  40*  --  40*   < > 52*   JUSTIN 9.0  --   --  9.1  --  8.7  --  8.2*   < > 9.5   MAG 2.1  --   --  2.1  --  2.3  --  2.2   < >  --    PROTTOTAL  --   --   --   --   --   --   --   --   --  7.5   ALBUMIN  --   --   --   --   --   --   --   --   --  2.9*   BILITOTAL  --   --   --   --   --   --   --   --   --  1.4*   ALKPHOS  --   --   --   --   --   --   --   --   --  97   AST  --   --   --   --   --   --   --   --   --  37*   ALT  --   --   --   --   --   --   --   --   --  11    < > = values in this interval not displayed.     CBC  Recent Labs   Lab 23 23  0343   WBC 8.0 7.5 8.5 8.8   RBC 4.75 4.87 5.07 4.68   HGB 14.0 14.2 15.0 13.6   HCT 45.9 47.4* 49.2* 45.7   MCV 97 97 97 98   MCH 29.5 29.2 29.6 29.1   MCHC 30.5* 30.0* 30.5* 29.8*   RDW 16.4* 16.8* 17.0* 17.2*    221 229 210     Arterial Blood Gas  Recent Labs   Lab 23  0429 23  0319 23  1631 23  1023   O2PER 30  30       Diagnostics:  23 EC/24/23 Echo:  Technically difficult study.Extremely difficult acoustic windows despite the  use of contrast for endcardial border definition.  Left ventricular function is decreased. The ejection fraction is 40-45%  (mildly  reduced).  Right ventricular function cannot be assessed due to poor image quality.  IVC diameter >2.1 cm collapsing <50% with sniff suggests a high RA pressure  estimated at 15 mmHg or greater.     This study was compared with the study from 6/14/2022. No significant changes  Noted.    1/27/23 Right JOSETTE:                                                                  IMPRESSION: Negative study. Triphasic waveforms and adequate  velocities in the right posterior tibial, anterior tibial, and  dorsalis pedis arteries.    No results found for this or any previous visit (from the past 24 hour(s)).    Medical Decision Making     35 MINUTES SPENT BY ME on the date of service doing chart review, history, exam, documentation & further activities per the note.        Patient discussed with Dr. Ramon Appiah, who agrees with above plan.    Maryana Rubin, CNP  [facility] Cardiology Team

## 2023-02-04 NOTE — PROGRESS NOTES
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 Initially transferred for intubation, but patient remained on BiPAP throughout ICU admission. Improved 1/29 after holding diuretics. Now on room air.        Assessment:      1)  Type II diabetes c/b insulin resistance, diabetic neuropathy/nephropathy; sub optimal control (A1c 13.5%)  2)  Acute persistent hyperglycemia   3)  Labile blood sugars   4)  Morbid obesity; BMI 74  5)  LE edema        Plan:       -   Increase Lantus to 65 unit(s) at 0800 tomorrow     -   Lantus 10 units from last night, 50 units this Am, another 5 units ordered   Expect her Bg will be high tonight as 10 units from last night will wear off tonight    -   Novolog 1:8 g cho TID AC/HS meals/snacks    -   BG  TID AC/HS/02    -   Novolog high resistance sliding scale insulin    -   Recommend Podiatry for diab foot exam      -   PTA med: not taking Ozempic but should resume outpatient     -   Hypoglycemia protocol    -   Recommend carb counting protocol    -   Education :  TBD - not anticipated     -   Outpatient follow up:  recommend [facility] - needs to re-schedule with Dr. Silva Damon       Plan discussed with patient, bedside RN/primary team via this note.           Interval History:     The last 24 hours progress and nursing notes reviewed.          Pricilla reports feeling periods of exhaustaion     Planned  "Procedures/surgeries: none  D5W-containing solutions/medications: none    Recent Labs   Lab 02/04/23  1618 02/04/23  1609 02/04/23  1051 02/04/23  0952 02/04/23  0742 02/04/23  0431   * 255* 309* 331* 382* 292*           Nutrition:    Orders Placed This Encounter      Combination Diet Moderate Consistent Carb (60 g CHO per Meal) Diet; 2 gm NA Diet        PTA Regimen:      Tuojeo 40 unit(s) once daily at HS  Aspart 10 units with each meal + additional correction scale insulin as follows based on pre-meal glucose      -140 - 160: + 1 unit     -161 - 180: + 2 units     -181 - 200: + 3 units, and so on (sensitivity of 20 mg/dL)     Ozempic 1 mg weekly - she reports she did NOT resume as instructed     BG monitoring frequency:  3-4 times per day  BG trends at home: \"all over\" but mostly between 85 - 300      Historical:   BG monitor: fingerstick  Frequency of checks: 3 times daily  Tuojeo (glargine U300) 225 units once daily  Novolog 100  Units with meals, plus 1/15 correction scale  Trulicity 1mg weekly on Mondays  Jardiance 25 mg daily AM - stopped 2/2 UTI's   (has not tolerated Metformin in the past)          Review of Systems:   CC: \"feeling so much better today - stomach is fine.          Medications:   Steroid planning:  no  Tube Feeding: no       Physical Exam:   /67 (BP Location: Left arm, Cuff Size: Adult Large)   Pulse 112   Temp 97.4  F (36.3  C) (Oral)   Resp 17   Ht 1.702 m (5' 7\")   Wt (!) 195 kg (430 lb)   SpO2 100%   BMI 67.35 kg/m    General:  Pleasant, sitting in chair, sleepy  Lungs: non-labored,   MSK: fluid movement of extremities   Mental status: Alert, oriented x3, communicating clearly, memory intact.  Psych: calm, appropriate mood, congruent affect.            Data:     Lab Results   Component Value Date    A1C 13.5 12/25/2022    A1C >15.0 07/23/2021    A1C >14.0 08/19/2020    A1C 8.4 10/11/2018    A1C 9.6 05/29/2018    A1C 9.0 04/07/2017    A1C 12.1 03/10/2016        CBC " RESULTS:   Recent Labs   Lab Test 02/03/23  0444   WBC 7.5   RBC 4.87   HGB 14.2   HCT 47.4*   MCV 97   MCH 29.2   MCHC 30.0*   RDW 16.8*        Recent Labs   Lab Test 02/03/23  0550 02/03/23  0444 02/03/23  0242 02/03/23  0147   NA  --  131*  --  129*   POTASSIUM  --  4.9  --  5.0   CHLORIDE  --  90*  --  89*   CO2  --  31*  --  27   ANIONGAP  --  10  --  13   * 143*   < > 165*   BUN  --  48.2*  --  44.2*   CR  --  1.57*  --  1.57*   JUSTIN  --  8.7  --  8.2*    < > = values in this interval not displayed.     Liver Function Studies -   Recent Labs   Lab Test 01/29/23  0319   PROTTOTAL 7.5   ALBUMIN 2.9*   BILITOTAL 1.4*   ALKPHOS 97   AST 37*   ALT 11     Lab Results   Component Value Date    INR 1.44 01/25/2023    INR 1.57 01/24/2023    INR 1.36 12/25/2022    INR 0.99 08/19/2020    INR 1.94 10/29/2019    INR 3.49 07/23/2019    INR 2.29 06/26/2019    INR 3.02 03/18/2019    INR 2.42 03/13/2019    INR 1.21 02/19/2019    INR 3.70 01/07/2019    INR 3.24 11/30/2018    INR 2.13 10/30/2018    INR 2.99 10/24/2018    INR 2.18 10/11/2018       Melvina Amezquita MD  Endocrinology fellow  Page number: [phone]    I, Jodi Medrano, personally evaluated this patient. I discussed the patient with the fellow and agree with the assessment and plan of care as documented in the fellow's note. I  reviewed vital signs, medications, labs and imaging.    Key Findings: Middle age female with T2DM with hyperglycemia on prior lantus dose, adjust; evelated TSH upon admission, pt on outpt treatment dose, cont    Jodi Medrano MD  Endocrinology and Diabetes  Telephone contact:  [facility] [place] [phone]  [facility] [phone]

## 2023-02-05 NOTE — PROGRESS NOTES
Care Management Follow Up    Length of Stay (days): 13    Expected Discharge Date: 02/06/2023     Concerns to be Addressed: Discharge planning, TCU placement  Patient plan of care discussed at interdisciplinary rounds: Yes    Anticipated Discharge Disposition: TCU      Anticipated Discharge Services:  PT, OT in transitional care setting  Anticipated Discharge DME:  No new home DME anticipated    Patient/family educated on Medicare website which has current facility and service quality ratings:  Yes, per SW previously  Education Provided on the Discharge Plan:  Not today  Patient/Family in Agreement with the Plan:  Yes, per SW previously    Referrals Placed by CM/SW:  No new referrals today  Private pay costs discussed: Not applicable    Additional Information:  Patient medically ready for discharge, needing TCU placement. Followed up on TCU referrals initiated by 6C SW 2/3:     [facility] - Spoke with Lisa in Admissions (pager [phone]). She said she may have a female bed for tomorrow (Monday 2/7) and will call us back in the morning.     [facility] - Fax failed, re-faxed. LVM for Admissions with request to call back 6C SW tomorrow.     [facility] - Spoke with staff who reported they do not take weekend admissions. Staff transferred me to Admissions , but no menu option for leaving a message.     [facility] - LVM for Admissions with request to call back 6C SW tomorrow.     [facility] - LVM for Admissions with request to call back 6C SW tomorrow. Epic discharge referral indicates there is a new message r/t this referral, but it is not visible to me - [facility]     [facility] - Fax failed, refaxed. LVM for Admissions with request to call back 6C SW tomorrow.     [facility] - LVM for Admissions with request to call back 6C SW tomorrow.     [facility] - Spoke with weekend staff who handles  admissions - she could not see previously sent referrals and had no beds for today, requested I leave a message for weekday admissions staff. LVM with request to call back 6C SW Monday.    PAS completed this afternoon - [id number]    Stephanie Alba, RN, PHN  RN Care Coordinator (Casual)  [address] [address]   [email or web address]   Casual Employee - Weekend Coverage only  To contact the weekend RNCC  [place] (0800 [phone]) Saturday and Sunday    Units: 4A, 4C, 4E, 5A and 5B  Pager: [phone]    Units: 6A, 6B  Pager: [phone]  - Units: 6C, 6D Pager: [phone]    Units: 7A, 7B, 7C, 7D, and 5C  Pager: [phone]     [facility] ([phone]) Saturday and Sunday    Units: [facility] ED, 5 Ortho, 5 Med/Surg, 6 Med/Surg, 8A, 10 ICU, & Pediatric Units Pager: [phone]

## 2023-02-05 NOTE — PROGRESS NOTES
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 Initially transferred for intubation, but patient remained on BiPAP throughout ICU admission. Improved 1/29 after holding diuretics. Now on room air.        Assessment:      1)  Type II diabetes c/b insulin resistance, diabetic neuropathy/nephropathy; sub optimal control (A1c 13.5%)  2)  Acute persistent hyperglycemia   3)  Labile blood sugars   4)  Morbid obesity; BMI 74  5)  LE edema        Plan:       -   Lantus 65 unit(s) at 0800    -   Novolog 1:8 g cho TID AC/HS meals/snacks    -   BG  TID AC/HS/02    -   Novolog high resistance sliding scale insulin    -   Recommend Podiatry for diab foot exam      -   PTA med: not taking Ozempic but should resume outpatient     -   Hypoglycemia protocol    -   Recommend carb counting protocol    -   Education :  TBD - not anticipated     -   Outpatient follow up:  recommend [facility] - needs to re-schedule with Dr. Silva Damon         Hx Graves s/p ROGER ablation  Hypothyroidism  Hx non-adherence to LT4  TSH 20 on admission, resumed her PTA dose 200mcg 6 days week, 400mcg once weekly  Recheck TFTs in 6-8 weeks outpatient  Discussed importance of being adherent to LT4, effect on cardiac issues      Plan discussed with patient, bedside RN/primary team via this note.           Interval History:     The last 24 hours progress and nursing notes reviewed.   "  Feeling better today when compared to yesterday, has energy. Got out of bed  Eating ok          Planned Procedures/surgeries: none  D5W-containing solutions/medications: none    Recent Labs   Lab 02/05/23  0659 02/05/23  0508 02/05/23  0309 02/04/23  2102 02/04/23  1817 02/04/23  1618   * 185* 190* 244* 322* 425*           Nutrition:    Orders Placed This Encounter      Combination Diet Moderate Consistent Carb (60 g CHO per Meal) Diet; 2 gm NA Diet        PTA Regimen:      Tuojeo 40 unit(s) once daily at HS  Aspart 10 units with each meal + additional correction scale insulin as follows based on pre-meal glucose      -140 - 160: + 1 unit     -161 - 180: + 2 units     -181 - 200: + 3 units, and so on (sensitivity of 20 mg/dL)     Ozempic 1 mg weekly - she reports she did NOT resume as instructed     BG monitoring frequency:  3-4 times per day  BG trends at home: \"all over\" but mostly between 85 - 300      Historical:   BG monitor: fingerstick  Frequency of checks: 3 times daily  Tuojeo (glargine U300) 225 units once daily  Novolog 100  Units with meals, plus 1/15 correction scale  Trulicity 1mg weekly on Mondays  Jardiance 25 mg daily AM - stopped 2/2 UTI's   (has not tolerated Metformin in the past)          Review of Systems:   CC: \"feeling so much better today - stomach is fine.          Medications:   Steroid planning:  no  Tube Feeding: no       Physical Exam:   /68   Pulse 103   Temp 97.5  F (36.4  C) (Oral)   Resp 20   Ht 1.702 m (5' 7\")   Wt (!) 193.7 kg (427 lb)   SpO2 100%   BMI 66.88 kg/m    General:  Pleasant, sitting in chair, sleepy  Lungs: non-labored,   MSK: fluid movement of extremities   Mental status: Alert, oriented x3, communicating clearly, memory intact.  Psych: calm, appropriate mood, congruent affect.            Data:     Lab Results   Component Value Date    A1C 13.5 12/25/2022    A1C >15.0 07/23/2021    A1C >14.0 08/19/2020    A1C 8.4 10/11/2018    A1C 9.6 05/29/2018 "    A1C 9.0 04/07/2017    A1C 12.1 03/10/2016        CBC RESULTS:   Recent Labs   Lab Test 02/03/23  0444   WBC 7.5   RBC 4.87   HGB 14.2   HCT 47.4*   MCV 97   MCH 29.2   MCHC 30.0*   RDW 16.8*        Recent Labs   Lab Test 02/03/23  0550 02/03/23  0444 02/03/23  0242 02/03/23  0147   NA  --  131*  --  129*   POTASSIUM  --  4.9  --  5.0   CHLORIDE  --  90*  --  89*   CO2  --  31*  --  27   ANIONGAP  --  10  --  13   * 143*   < > 165*   BUN  --  48.2*  --  44.2*   CR  --  1.57*  --  1.57*   JUSTIN  --  8.7  --  8.2*    < > = values in this interval not displayed.     Liver Function Studies -   Recent Labs   Lab Test 01/29/23  0319   PROTTOTAL 7.5   ALBUMIN 2.9*   BILITOTAL 1.4*   ALKPHOS 97   AST 37*   ALT 11     Lab Results   Component Value Date    INR 1.44 01/25/2023    INR 1.57 01/24/2023    INR 1.36 12/25/2022    INR 0.99 08/19/2020    INR 1.94 10/29/2019    INR 3.49 07/23/2019    INR 2.29 06/26/2019    INR 3.02 03/18/2019    INR 2.42 03/13/2019    INR 1.21 02/19/2019    INR 3.70 01/07/2019    INR 3.24 11/30/2018    INR 2.13 10/30/2018    INR 2.99 10/24/2018    INR 2.18 10/11/2018       Melvina Amezquita MD  Endocrinology fellow  Page number: [phone]    I, Jodi Medrano, personally evaluated this patient. I discussed the patient with the fellow and agree with the assessment and plan of care as documented in the fellow's note. I  reviewed vital signs, medications, labs and imaging.    Key Findings: Patient was type 2 diabetes, this improved blood glucose control on higher days of daily Lantus, continue NovoLog carbohydrate coverage and sliding scale    Jodi Medrano MD  Endocrinology and Diabetes  Telephone contact:  [facility] [place] [phone]  [facility] [phone]

## 2023-02-05 NOTE — PLAN OF CARE
Pricilla Brown is a 47 year old female with a history of HFmrEF (EF 40-45%) 2/2 NICM, long-standing persistent AF, HTN, type II DM, morbid obesity, asthma, JYOTI and Graves disease s/p radioiodine ablation c/b hypothyroid admitted 1/23/2023 for heart failure exacerbation with hospital course complicated by CO2 retention and being obtunded, transferred to ICU on 1/28/2023 for encephalopathy suspected secondary to hypercapnia in setting of aggressive diuresis and contraction alkalosis despite using diamox. Initially transferred for intubation, but patient remained on BiPAP throughout ICU admission. Improved 1/29 after holding diuretics.    Temp: 97.5  F (36.4  C) Temp src: Oral BP: 131/68 Pulse: 103   Resp: 20 SpO2: 100 % O2 Device: BiPAP/CPAP Oxygen Delivery: 1.5 LPM       Neuro: AOx4  Cardiac: Persistent Afib, often Tachy  Resp: Sating well on 1.5L NC during day w/cpap at night  GI/: Good urine output with Keenan, no bm this shift  Endo: ACHS with carb coverage  Diet: Consistent carb, 2g Na, 2L FR  Activity: Needs lift for movement  Pain: Joint and generalized pain being treated with PRN tylenol, robaxin, and percocet  Skin: See WOC notes  LDA's: 2 left PIV's SL and Keenan  Plan: Continue to monitor and contact Cards 1 with any changes/concerns

## 2023-02-05 NOTE — PLAN OF CARE
"/85 (BP Location: Right arm)   Pulse 106   Temp 98.1  F (36.7  C) (Oral)   Resp 21   Ht 1.702 m (5' 7\")   Wt (!) 193.7 kg (427 lb)   SpO2 100%   BMI 66.88 kg/m      Shift: 0392-5410  Reason for Admission: heart failure exacerbation, CO2 retention, Encephalopathy   VS/Tele: VSS. Afebrile  Neuros: A/Ox4, able to make needs known. Neuropathy on BLE  Respiratory: Sats >95% on 2L NC. O2 is used primarily for comfort  GI/: No BMs this shift, Keenan in place  Nutrition: Tolerating CHO and 2g Na diet with 2L FR  Drains/Lines: 2x L. PIV SL  Activity: A2 + lift. Worked with PT today. Up in chair  Pain/Nausea: C/o of leg + foot pain- PRN Dilaudid, Percocet, Tylenol, and Robaxin given. Denies nausea  Skin: R. Toes- dry/cracked, dead tissue- SAMEER, applied medication per POC  Labs: BG ACHS  Plan of care: Plan for TCU placement. Will continue to monitor and follow POC.     "

## 2023-02-05 NOTE — PROGRESS NOTES
[facility]   Cardiology   Progress Note     ASSESSMENT/PLAN:  Pricilla Brown is a 47 year old female with a history of HFmrEF (EF 40-45%) 2/2 NICM, long-standing persistent AF, HTN, type II DM, morbid obesity, asthma, JYOTI and Graves disease s/p radioiodine ablation c/b hypothyroid admitted 1/23/2023 for heart failure exacerbation with hospital course complicated by CO2 retention and being obtunded, transferred to ICU on 1/28/2023 for encephalopathy suspected secondary to hypercapnia in setting of aggressive diuresis and contraction alkalosis despite using diamox. Initially transferred for intubation, but patient remained on BiPAP throughout ICU admission. Improved 1/29 after holding diuretics. Now on room air.     Changes Today:  - Diuresis going well and able to assess volume status adequately, can hold off on RHC at this time  - Work on TCU placements    # Acute on Chronic Systolic Heart Failure (EF 40-45%)  # Long-Standing Persistent Atrial Fibrllation  # Hypertension   # Troponin Elevation 2/2 CHF  Pt admitted with c/o increasing SOB, MURRAY, chest tightness, weight gain in spite of compliance with PO diuretics. NT BNP 5891 (was 2030 during most recent admission).  Lactic acid 2.2 on admission with improvement to 1.1 with diuresis. Troponin mildly elevated, 67-->75-->60 likely 2/2 CHF exacerbation; no concern for ACS currently. Most recent Echo with EF 40-45%. Coronary angiogram 2012 with no significant CAD. Of note, pt recently admitted 12/25-1/1 HF exacerbation, diuresed to 472lbs. Pt weight on admission 1/23 555lbs.   - Volume Status: volume status improving, Diuresis going well and able to assess volume status adequately, can hold off on RHC at this time  - Beta Blocker: Continue Toprol XL 25mg; up-titrate as needed for HR   - ACEi/ARB/ARNI: Discontinue Lisinopril d/t allergy. ARB/ARNi contraindicated w/ allergy to Lisinopril.   - SGLT2i: Contraindicated with recurrent yeast  infections   - Aldosterone Antagonist: Continue PTA Spironolactone 50mg  - Rate Control: BB as above. Discontinue diltiazem 2/2 HF.   - Anticoagulation: CHADSVASC 3; Continue PTA apixaban  - Strict I/O & Daily Weights  - Low Na Diet / 2L Fluid Restriction  - Twice daily BMP's with BID diuretic dosing  - CORE consulted; has appointment 2/10. Should discuss CardioMEMS at this appointment.      # Chronic Kidney Disease Stage 3b  Longstanding history of diabetes and HTN. Baseline Creatinine appears to be around 1.2-1.4. Creatinine on admission 1.56.   - Daily BMP's  - Avoid nephrotoxic agents  - Creatinine 1.28 today    # Graves Disease s/p Radioiodine Ablation  # Hypothyroidism 2/2 Radioiodine Ablation  TSH 20.4 and free T4 0.82 on admission.   - Endocrine consulted; recommendations below   - Continue levothyroxine 200 mcg M-Sat- 400 mcg on Sunday.    - Recheck TFT in a month     # Type 2 Diabetes Mellitus  # Morbid obesity  A1c 13.5% on 12/25/2022. Discrepancies in patient's home regimen. Endocrinology consulted during admission, signed off, and re-consulted 2/1/23.   - Insulin gtt started 2/1 per endocrinology; appreciate ongoing recommendations and transition back to subcutaneous lantus and novolog on 2/3/23.   - Lantus  increased per endocrinology to 65 units daily per Endocrine   - Continue Novolog mealtime insulin  1:8g CHO(while inpatient)  - Continue high intensity SSI   - POCT glucose checks QID   - Hypoglycemia protocol  - Resume PTA Ozempic at discharge  - Discharge recommendations per endocrinology; Toujeo 40 at bedtime, aspart 10u w/ each meal + additional coverage based on pre-meal glucose (140-160) + 1 unit, (161-180) + 2 units, (181-200) + 3 units, and resume Ozempic 1mg weekly.     # Right Toe Wound  Present on admission, right great toe and 3rd toe, dry gangrene, pt unaware how long wounds have been present.   - WOCN following: wound cares to right toe daily. Paint toes with betadine. Be sure to apply  over dry eschar. Ok to leave open to air. No soaking feet.  - Vascular consulted; JOSETTE's completed showing patent vessels and normal waveforms. They recommend orthopedic foot and ankle consult   - Ortho consulted; plan for outpatient follow up to discuss elective amputation on 2/17/23 at 10:25 with Dr. Oscar Leahy.  - Continue with LE elevation     # Asthma  Patient endorsing mouth irritation w/ inhaler use. Roof of mouth erythematous and appears excoriated but w/o evidence of yeast.   - Continue PTA inhalers (levalbuterol and spiriva)  - Magic mouthwash ordered      # JYOTI  - Continue BiPAP at night    # Hyperlipidemia   - Continue lipitor 40 mg daily     # Pain  Patient reporting pain everywhere. Is not interested narcotics for control. Suspect pain could be stemming from aggressive diuresis vs diabetic neuropathy. Interfering with patients ability and willingness to participate in therapy.   - Pain management curbside consulted 1/31; decrease gabapentin to BID, stop flexeril, add Robaxin 400 q6hr PRN  - PRN Lidocaine patches     FEN: 2 gm Sodium / 2L Fluid restriction  Code status: Full  Prophylaxis: PO Eliquis  Isolation: None  Disposition: Pending TCU placement      =================================================================  Interval History: No acute events overnight. Patient hemodynamically stable, requiring minimal supplemental o2.  Net negative 2.175L in last 24 hours. Still having some achey joints. She is agreeable to TCU discharge. VSS.     Physical Exam:  Temp:  [97.4  F (36.3  C)-98.5  F (36.9  C)] 97.5  F (36.4  C)  Pulse:  [] 103  Resp:  [17-20] 20  BP: ()/(60-81) 131/68  FiO2 (%):  [30 %] 30 %  SpO2:  [98 %-100 %] 100 %    I/O:   Intake/Output Summary (Last 24 hours) at 2/3/2023 0747  Last data filed at 2/3/2023 0615  Gross per 24 hour   Intake 1423.56 ml   Output 4169 ml   Net -2745.44 ml       Wt:   Wt Readings from Last 5 Encounters:   02/05/23 (!) 193.7 kg (427 lb)   01/01/23  (!) 214.4 kg (472 lb 9.6 oz)   10/31/22 (!) 201 kg (443 lb 3.2 oz)   10/26/22 (!) 208.6 kg (459 lb 14.4 oz)   06/21/22 (!) 191 kg (421 lb)     General: NAD  HEENT:  PERRLA, EOMI.   Neck: JVD Difficult to assess due to body habtius.   CV: Irregular rhythm. No murmur appreciated. No rubs or gallops. Peripheral radial pulse intact.  Resp: No increased work of breathing or use of accessory muscles, breathing comfortably on room air.  Lung sounds clear throughout/bilaterally  Abdomen:  Normal active bowel sounds.  Abdomen is soft. No distension, non-tender to palpation.    Extremities: Warm. Capillary refill less than 3 sec. 3/4 radial pulses bilaterally.  3/4 pedal pulses bilaterally. 1+ pitting pre-tibial edema. No cyanosis or clubbing.  Skin:  Warm and dry. No erythema, rashes, ulceration or diaphoresis.  Neuro: Alert and oriented x3.    Medications:    apixaban ANTICOAGULANT  5 mg Oral BID     atorvastatin  40 mg Oral Daily     bumetanide  3 mg Oral BID     gabapentin  600 mg Oral BID     heparin lock flush  5-20 mL Intracatheter Q24H     insulin aspart  1-10 Units Subcutaneous TID AC     insulin aspart  1-7 Units Subcutaneous BID     insulin aspart   Subcutaneous TID w/meals     insulin glargine  65 Units Subcutaneous Daily     levothyroxine  200 mcg Oral Once per day on Mon Tue Wed Thu Fri Sat     levothyroxine  400 mcg Oral Weekly     lidocaine  2 patch Transdermal Q24H     lidocaine   Transdermal Q8H [facility]     metoprolol succinate ER  25 mg Oral Daily     miconazole   Topical BID     nicotine  1 patch Transdermal Daily    And     nicotine   Transdermal Q8H [facility]     nitroFURantoin macrocrystal-monohydrate  100 mg Oral Q12H [facility] (08/20)     polyethylene glycol  17 g Oral Daily     sennosides  8.6 mg Oral BID     sodium chloride (PF)  10-40 mL Intracatheter Q8H     spironolactone  50 mg Oral Daily     topiramate  75 mg Oral At Bedtime     umeclidinium  1 puff Inhalation Daily       - MEDICATION INSTRUCTIONS -        ACE/ARB/ARNI NOT PRESCRIBED       sodium chloride         Labs:   CMP  Recent Labs   Lab 23  0309 23  2102 23  1817 23  1618 23  1609 23  0742 23  0431 23  2148 23  1817 23  0550 23  0444   NA  --   --   --   --  132*  --  132*  --  130*  --  131*   POTASSIUM  --   --   --   --  5.4*  --  4.5  --  4.6  --  4.9   CHLORIDE  --   --   --   --  92*  --  92*  --  89*  --  90*   CO2  --   --   --   --  28  --  30*  --  32*  --  31*   ANIONGAP  --   --   --   --  12  --  10  --  9  --  10   * 244* 322* 425* 255*   < > 292*   < > 318*   < > 143*   BUN  --   --   --   --  48.6*  --  49.2*  --  47.3*  --  48.2*   CR  --   --   --   --  1.26*  --  1.28*  --  1.38*  --  1.57*   GFRESTIMATED  --   --   --   --  52*  --  51*  --  47*  --  40*   JUSTIN  --   --   --   --  9.0  --  9.0  --  9.1  --  8.7   MAG  --   --   --   --  2.0  --  2.1  --  2.1  --  2.3    < > = values in this interval not displayed.     CBC  Recent Labs   Lab 23  0508 23  0431 23  0444 23  0620   WBC 6.5 8.0 7.5 8.5   RBC 4.66 4.75 4.87 5.07   HGB 13.7 14.0 14.2 15.0   HCT 45.2 45.9 47.4* 49.2*   MCV 97 97 97 97   MCH 29.4 29.5 29.2 29.6   MCHC 30.3* 30.5* 30.0* 30.5*   RDW 16.3* 16.4* 16.8* 17.0*    249 221 229     Arterial Blood Gas  Recent Labs   Lab 23  0429   O2PER 30       Diagnostics:  23 EC/24/23 Echo:  Technically difficult study.Extremely difficult acoustic windows despite the  use of contrast for endcardial border definition.  Left ventricular function is decreased. The ejection fraction is 40-45%  (mildly reduced).  Right ventricular function cannot be assessed due to poor image quality.  IVC diameter >2.1 cm collapsing <50% with sniff suggests a high RA pressure  estimated at 15 mmHg or greater.     This study was compared with the study from 2022. No significant changes  Noted.    23 Right  JOSETTE:                                                                  IMPRESSION: Negative study. Triphasic waveforms and adequate  velocities in the right posterior tibial, anterior tibial, and  dorsalis pedis arteries.    No results found for this or any previous visit (from the past 24 hour(s)).    Medical Decision Making     25 MINUTES SPENT BY ME on the date of service doing chart review, history, exam, documentation & further activities per the note.        Patient discussed with Dr. Ramon Appiah, who agrees with above plan.    Maryana Rubin, CNP  [facility] Cardiology Team

## 2023-02-06 NOTE — TELEPHONE ENCOUNTER
Short Acting Insulin Protocol Failed 02/06/2023 06:48 AM   Protocol Details  Recent (6 mo) or future (30 days) visit within the authorizing provider's specialty     No Show 01/31/23 with Dr Silva FROST  No Show 09/13/2022 with Karen Velasquez  No Show 07/12/2022 with Karen Velasquez  No Show 06/28/2022 with Dr Silva FROST  No Show 06/16/2022 with Isela SAMUEL 11/23/21 with Dr Silva FROST  07/23/2012 With Dr Wright

## 2023-02-06 NOTE — PROGRESS NOTES
Diabetes Consult Daily  Progress Note         Assessment/plan:     1)  Type II diabetes c/b insulin resistance, diabetic neuropathy/nephropathy; sub optimal control (A1c 13.5%)  2)  Acute persistent hyperglycemia   3)  Labile blood sugars   4)  Morbid obesity; BMI 74  5)  LE edema        Plan:       -   Continue Lantus 70 unit(s) at 0800    -   Increase novolog from 1:8 to 1:4 g cho TID AC/HS meals/snacks    -   BG  TID AC/HS/02    -   Novolog high resistance sliding scale insulin    -   Recommend Podiatry for diab foot exam      -   PTA med: not taking Ozempic but should resume outpatient     -   Hypoglycemia protocol    -   Recommend carb counting protocol    -   Education :  TBD - not anticipated     -   Outpatient follow up:  recommend [facility] - needs to re-schedule with Dr. Silva Damon            Hx Graves s/p ROGER ablation  Hypothyroidism  Hx non-adherence to LT4  TSH 20 on admission, resumed her PTA dose 200mcg 6 days week, 400mcg once weekly  Recheck TFTs in 6-8 weeks outpatient  Discussed importance of being adherent to LT4, effect on cardiac issues                 Chief Complaint/HPI:   Pricilla Brown is a 47 year old female with a history of HFmrEF (EF 40-45%) 2/2 NICM, long-standing persistent AF, HTN, type II DM, morbid obesity, asthma, JYOTI and Graves disease s/p radioiodine ablation c/b hypothyroid admitted 1/23/2023 for heart failure exacerbation with hospital course complicated by CO2 retention and being obtunded, transferred to ICU on 1/28/2023 for encephalopathy suspected secondary to hypercapnia in setting of aggressive diuresis and contraction alkalosis despite using diamox. Initially transferred for intubation, but patient remained on BiPAP throughout ICU admission. Improved 1/29 after holding diuretics. Now on room air.               Interval History:     The last 24 hours progress and nursing notes reviewed.      Fasting BG: at goal  Daytime  "BG: slightly elevated  Appetite: good  Kidney function: stable CKD      Relevant order  Aspart 1/8 g CHO  Lantus 70 unit(s) daily  Aspart HIRD    Active Diet Order  Orders Placed This Encounter      Combination Diet Moderate Consistent Carb (60 g CHO per Meal) Diet; 2 gm NA Diet        426 lbs 0 oz  No results for input(s): A1C in the last 168 hours.  Recent Labs   Lab 02/06/23  0519   CR 1.21*     Recent Labs   Lab 02/06/23  1204 02/06/23  0700 02/06/23  0519 02/06/23  0254 02/05/23  2301 02/05/23  2152   * 193* 170* 170* 269* 428*       Diabetes Type:  Type 2 Diabetes  IFEOMA-65 ziyad, c-peptide, islet cell ziyad - none found in Epic review      Diabetes Duration: since at least 2013          Nutrition:    Orders Placed This Encounter      Combination Diet Moderate Consistent Carb (60 g CHO per Meal) Diet; 2 gm NA Diet         PTA Regimen:      Tuojeo 40 unit(s) once daily at HS  Aspart 10 units with each meal + additional correction scale insulin as follows based on pre-meal glucose      -140 - 160: + 1 unit     -161 - 180: + 2 units     -181 - 200: + 3 units, and so on (sensitivity of 20 mg/dL)     Ozempic 1 mg weekly - she reports she did NOT resume as instructed     BG monitoring frequency:  3-4 times per day  BG trends at home: \"all over\" but mostly between 85 - 300      Historical:   BG monitor: fingerstick  Frequency of checks: 3 times daily  Tuojeo (glargine U300) 225 units once daily  Novolog 100  Units with meals, plus 1/15 correction scale  Trulicity 1mg weekly on Mondays  Jardiance 25 mg daily AM - stopped 2/2 UTI's   (has not tolerated Metformin in the past)     Ability to [place] Prescribed Regimen: has been independent - barriers to consistent compliance     Diabetes Complications:  retinopathy unk, last dilated eye exam unk  Peripheral neuropathy:  +, on gabapentin   Nephropathy:  +, recent microalbuminuria + worsening     History of DKA:   unk  Able to Detect Hypoglycemia: yes  Safety Kit:  " "no  Medic Alert:  No      Usual Diabetes Care Provider/CDCES: hx [facility] - no visits recently  - scheduled with Dr. Damon - no show on 1/31/2023 (in-patient since 1/23).  ARIES AlexanderN, RN, CDCES - educator       Exam  BP 99/71 (BP Location: Left arm, Patient Position: Chair, Cuff Size: Adult Large)   Pulse 100   Temp 98.3  F (36.8  C) (Oral)   Resp 18   Ht 1.702 m (5' 7\")   Wt (!) 193.2 kg (426 lb)   SpO2 96%   BMI 66.72 kg/m    GENERAL: lying in bed with slight HOB up pillow no distress  SKIN: Visible skin clear.  EYES: Eyes grossly normal to inspection.  RESP: No audible wheeze, cough, No visible retractions or increased work of breathing.  NEURO.  Awake, alert, responds appropriately to questions.  Mentation and speech fluent.  PSYCH: affect normal, and appearance well-groomed.      DATA   Lab Results   Component Value Date    A1C 13.5 12/25/2022    A1C >15.0 07/23/2021    A1C >14.0 08/19/2020    A1C 8.4 10/11/2018    A1C 9.6 05/29/2018    A1C 9.0 04/07/2017    A1C 12.1 03/10/2016     Lab Results   Component Value Date    CREAT 0.9 09/29/2014       CBC RESULTS:   Recent Labs   Lab Test 02/06/23  0519   WBC 6.8   RBC 4.80   HGB 14.1   HCT 46.8   MCV 98   MCH 29.4   MCHC 30.1*   RDW 16.1*        Recent Labs   Lab Test 02/06/23  1204 02/06/23  0700 02/06/23  0519 02/05/23  1618 02/05/23  1610   NA  --   --  131*  --  132*   POTASSIUM  --   --  4.5  --  4.7   CHLORIDE  --   --  91*  --  91*   CO2  --   --  29  --  31*   ANIONGAP  --   --  11  --  10   * 193* 170*   < > 179*   BUN  --   --  49.9*  --  50.0*   CR  --   --  1.21*  --  1.23*   JUSTIN  --   --  9.1  --  9.2    < > = values in this interval not displayed.     Liver Function Studies -   Recent Labs   Lab Test 01/29/23  0319   PROTTOTAL 7.5   ALBUMIN 2.9*   BILITOTAL 1.4*   ALKPHOS 97   AST 37*   ALT 11     Lab Results   Component Value Date    INR 1.44 01/25/2023    INR 1.57 01/24/2023    INR 1.36 12/25/2022    INR 0.99 " 08/19/2020    INR 1.94 10/29/2019    INR 3.49 07/23/2019    INR 2.29 06/26/2019    INR 3.02 03/18/2019    INR 2.42 03/13/2019    INR 1.21 02/19/2019    INR 3.70 01/07/2019    INR 3.24 11/30/2018    INR 2.13 10/30/2018    INR 2.99 10/24/2018    INR 2.18 10/11/2018       To contact Endocrine Diabetes service:   From 8AM-4PM: page inpatient diabetes provider that is following the patient that day (see filed or incomplete progress notes/consult notes).  If uncertain of provider assignment: page job code [id number].  For questions or updates from 4PM-8AM: page the diabetes job code for on call fellow: [phone]    Discussed with attending   Vladislav Disla MD  Endocrine Fellow  Pager # [phone]

## 2023-02-06 NOTE — TELEPHONE ENCOUNTER
insulin aspart (NOVOLOG FLEXPEN) 100 UNIT/ML pen        Last Written Prescription Date:  01/01/23  Last Fill Quantity: 330mL,   # refills: 0  Last Office Visit : 06/21/22  Future Office visit:  None scheduled    Routing refill request to provider for review/approval because:  Insulin - refilled per clinic

## 2023-02-06 NOTE — PLAN OF CARE
Pricilla Brown is a 47 year old female with a history of HFmrEF (EF 40-45%) 2/2 NICM, long-standing persistent AF, HTN, type II DM, morbid obesity, asthma, JYOTI and Graves disease s/p radioiodine ablation c/b hypothyroid admitted 1/23/2023 for heart failure exacerbation with hospital course complicated by CO2 retention and being obtunded, transferred to ICU on 1/28/2023 for encephalopathy suspected secondary to hypercapnia in setting of aggressive diuresis and contraction alkalosis despite using diamox. Initially transferred for intubation, but patient remained on BiPAP throughout ICU admission. Improved 1/29 after holding diuretics.    Temp: 97.9  F (36.6  C) Temp src: Axillary BP: 102/80 Pulse: 105   Resp: 18 SpO2: 100 % O2 Device: BiPAP/CPAP Oxygen Delivery: 1.5 LPM       Neuro: AOx4  Cardiac: persistent Afib, often tachy  Resp: 100% on 1.5 L NC  GI/: Diuresing with [place], no BM this shift, passing gas  Endo: ACHS, carb coverage  Diet: Consistent carb, 2g Na, 2L FR  Activity: Heavy 2 assist, mostly lift use  Pain: Being adequately managed w/ PRN robaxin, tylenol, and percocet  Skin: Dry gangrene on feet, follow orders for care  LDA's: 2 left PIV SL  Plan: Transfer to a TCU whenever opening is available. Continue to monitor and contact Cards 1 with any changes/concerns

## 2023-02-06 NOTE — PROGRESS NOTES
D-Admitted on 01/23/23 for heart failure exacerbation c/b CO2 retention, encephalopathy and hypercapneia. . Generalized pain. See flow sheets for vs and assessments.  I-New order for capsaicin cream, applied to muscles and joints. Also given tylenol and robaxin. Warm packs to hands. O2 at 2l via NC. Bipap at hs. Diuresing with oral bumex. Low Na diet. 2L FR.  A-Adequate pain control per pt's report. Good urine output. Remains in afib, chronic.  P-Continue with current poc. Notify Cards 1 provider of any concerns/changes. Anticipate discharge to TCU when bed available.

## 2023-02-06 NOTE — PROGRESS NOTES
Care Management Follow Up    Length of Stay (days): 14    Expected Discharge Date: 02/06/2023     Concerns to be Addressed: discharge planning     Patient plan of care discussed at interdisciplinary rounds: Yes    Anticipated Discharge Disposition: TCU     Anticipated Discharge Services:  TCU therapy services  Anticipated Discharge DME:  n/a    Patient/family educated on Medicare website which has current facility and service quality ratings:  Yes  Education Provided on the Discharge Plan:  yes  Patient/Family in Agreement with the Plan:  yes    Referrals Placed by CM/SW:    DENIALS  2/3--- [facility] (no bed until Tuesday, SW to re-send referral then)  2/3--- [facility] (not able to meet bariatric needs)  2/3--- [facility] (not able to meet bariatric needs)  2/6--- [facility] (not able to meet bariatric needs)  2/6--- [facility] (no available TCU beds anytime soon)  2/6--- [facility] (no bariatric beds available)  2/6--- [facility] (bariatric limit is 350 lbs so not able to meet the needs of the pt)     PENDING  [facility]  [address] [place] [address]  Phone: [phone]  Admissions: [phone]   Fax: [phone]  - 2/6: MARGO called admissions at 9:24am and got routed to the  with no option to leave one. SW to call back later. MARGO called again at 2:44pm and the same thing occurred.      [facility]  [address] [place] [address]  [address]  Phone: [phone]  Fax: [phone]  - 2/6: MARGO sent a message over to Celeste in rehab admissions requesting a re-review of the pt now that she's been more consistently participating with therapies. Celeste sent a response saying they will take a look again.    [facility]  [address] [place] [address]  [address]  Phone: [phone]  Fax: [phone]  - 2/6: MARGO called admissions at 9:17am and left a  requesting an update. MARGO called again at 2:45pm  and left a VM once again.     [facility]  [address], [place]  [address]  Phone: [phone]  Admissions: [phone]   Admissions fax: [phone]   Fax: [phone]  - 2/6: MARGO called admissions at 9:22am and left a VM requesting an update. SW called again at 2:56pm and left a VM once again.    Private pay costs discussed: Not applicable    Additional Information:  SW is following for discharge planning.    Pt is med ready and is needing TCU placement. As of this morning, pt is 426 lbs and bariatric needs are often a barrier to TCU placement. Many facilities have a certain limit (typically 300/350 lbs, varies place to place).    See above in the referrals section for updates.     ___________________    DANIEL Villagomez, CHARLENESW  6C   [facility]  Phone: [phone]  Pager: [phone]

## 2023-02-06 NOTE — DISCHARGE SUMMARY
[address] [address]  p: [phone]    Discharge Summary: Cardiology Service    Pricilla Brown MRN# [id number]   YOB: 1975 Age: 47 year old       Admission Date: 01/23/2023  Discharge Date: 02/14/2023    Discharge Diagnoses:  # Acute on Chronic Systolic Heart Failure with mildly reduced EF (EF 40-45%)  # Long-Standing Persistent Atrial Fibrllation  # Hypertension   # Troponin Elevation 2/2 CHF  # Chronic Kidney Disease Stage 3b  # NIRAJ, improving  # Hyperkalemia, resolved  # Graves Disease s/p Radioiodine Ablation  # Hypothyroidism 2/2 Radioiodine Ablation  # Type 2 Diabetes Mellitus  # Morbid obesity  # Right Toe Wound with necrosis  # Acute simple cystitis, resolved  # Urinary retention    Brief HPI:  Pricilla Brown is a 47 year old female with a history of HFmrEF (EF 40-45%) 2/2 NICM, long-standing persistent AF, HTN, type II DM, morbid obesity, asthma, JYOTI and Graves disease s/p radioiodine ablation c/b hypothyroid admitted 1/23/2023 for heart failure exacerbation with hospital course complicated by CO2 retention, transferred to ICU on 1/28/2023 for encephalopathy suspected secondary to hypercapnia in setting of aggressive diuresis and contraction alkalosis despite using diamox. Initially transferred for intubation, but patient remained on BiPAP throughout ICU admission. Improved 1/29 after holding diuretics. Patient also developed an NIRAJ, due to over diuresis, now stable on PO diuretics    Hospital Course by Diagnosis:  # Acute on Chronic Systolic Heart Failure (EF 40-45%)  # Long-Standing Persistent Atrial Fibrllation  # Hypertension   # Troponin Elevation 2/2 CHF  Pt admitted with c/o increasing SOB, MURRAY, chest tightness, weight gain in spite of compliance with PO diuretics. NT BNP 5891 (was 2030 during most recent admission).  Lactic acid 2.2 on admission with improvement to 1.1 with diuresis. Troponin mildly elevated,  67-->75-->60 likely 2/2 CHF exacerbation; no concern for ACS currently. Most recent Echo with EF 40-45%. Coronary angiogram 2012 with no significant CAD. Of note, pt recently admitted 12/25-1/1 HF exacerbation, diuresed to 472lbs. Pt weight on admission 1/23 555lbs.     - Volume Status: euvolemic, continue Bumex 1 mg daily, with additional prn dose for weight gain   - Weight on day of discharge: 450 lbs  - Given Cr/BUN trend, we believe EDW is around 445-450 lbs  - Beta Blocker: Continue Toprol XL 25mg daily  - ACEi/ARB/ARNI: Discontinue Lisinopril d/t allergy, if BP elevated, can trial Losartan    - SGLT2i: Contraindicated with recurrent yeast infections   - Aldosterone Antagonist: Discontinue Aldactone due to NIRAJ, hyperkalemia  - Rate Control: BB as above. Given mildly reduced EF, ok to use Diltiazem 120 mg BID  - Anticoagulation: CHADSVASC 3; Continue PTA apixaban  - Daily Weights; call [facility] clinic if weight increases 5 lbs in a day two days in a row or 10 lbs in 1 week   - Low Na Diet / 2L Fluid Restriction  - CORE consulted; follow up after ARU discharge Should discuss CardioMEMS at this appointment.      # Chronic Kidney Disease Stage 3b  # NIRAJ, improving  Longstanding history of diabetes and HTN. Baseline Creatinine appears to be around 1.2-1.4. Creatinine on admission 1.56. During hospitalization, patient diuresed to 427 lbs with NIRAJ, Cr peak 2.1. After backing off on diuretics, Cr improved, today 1.4  - Avoid nephrotoxic agents  - Resume Diuretics as listed above  - Repeat BMP 1 week    # Hyperkalemia, resolved  - Potassium continually rising since 2/9 with peak 2/11 @ 7.3  - EKG without acute changes, rhythm stable in a fib  - Potassium shifted with insulin, calcium gluconate for cardiac stability, lokelma 15 g X 1, IV Bumex total of 6 mg on 2/11/23  - Nephrology consulted, appreciate recs  - Discontinued spironolactone  - K 4.4 on day of discharge     # Graves Disease s/p Radioiodine Ablation  #  Hypothyroidism 2/2 Radioiodine Ablation  TSH 20.4 and free T4 0.82 on admission.   - Endocrine consulted; recommendations below   - Continue levothyroxine 200 mcg M-Sat- 400 mcg on Sunday.    - Recheck TFT in a month     # Type 2 Diabetes Mellitus  # Morbid obesity  A1c 13.5% on 12/25/2022. Discrepancies in patient's home regimen. Endocrinology consulted during admission, signed off, and re-consulted 2/1/23 -- BGs range 170-420  - Appreciate ongoing recommendations  - Insulin gtt started 2/1 per endocrinology, transitioned back to subcutaneous lantus and novolog on 2/3/23.   - Discharge recommendations per endocrinology; Lantus 70 units daily, Aspart 1:3 units with breakfast and lunch, 1:4 units for dinner, Aspart very high sliding scale  - OP follow up Thelma Archibald/Dr Silva Damon     # Right Toe Wound  Present on admission, right great toe and 3rd toe, dry gangrene, pt unaware how long wounds have been present.   - WOCN following: wound cares to right toe daily. Paint toes with betadine. Be sure to apply over dry eschar. Ok to leave open to air. No soaking feet.  - Vascular consulted; JOSETTE's completed showing patent vessels and normal waveforms. They recommend orthopedic foot and ankle consult   - Ortho consulted; plan for outpatient follow up  - Continue with LE elevation, wound cares    # Acute simple cystitis, resolved  Positive UA 2/2/23 and urine dicoloration.   - Completed course of Macrobid 100mg BID x5 days through 2/7    # Urinary Retention  Phan catheter placed on admission for strict I/O while aggressively diuresing. Removed 2/11, pt with urinary retention and straight cath x 3, Phan replaced. Urology curbside, continue phan for now, can re-attempt voiding trial as OP  - Follow up with OP Urology     # Asthma  Patient endorsing mouth irritation w/ inhaler use. Roof of mouth erythematous and appears excoriated but w/o evidence of yeast.   - Continue PTA inhalers (levalbuterol and spiriva)     #  JYOTI  - Continue BiPAP at night    # Hyperlipidemia   - Continue lipitor 40 mg daily     # Chronic Pain  Patient reporting pain everywhere. Has been using narcotics along with shae, topical nsaids, and muscle relaxants. Aggressive diuresis vs diabetic neuropathy.  - Pain management curbside consulted 1/31; decrease gabapentin to BID, stop flexeril    Consults:  Endocrinology  Vascular Surgery  Orthopedics  Pain Management  CORE  [facility]      Discharge medications:   Current Discharge Medication List        START taking these medications    Details   diclofenac (VOLTAREN) 1 % topical gel Apply 2 g topically 3 times daily as needed    Associated Diagnoses: Neuropathic pain of lower extremity, unspecified laterality      diltiazem ER (CARDIZEM SR) 120 MG CP12 12 hr SR capsule Take 1 capsule (120 mg) by mouth 2 times daily    Associated Diagnoses: Atrial fibrillation with rapid ventricular response (H)      insulin glargine (LANTUS PEN) 100 UNIT/ML pen Inject 70 Units Subcutaneous every 24 hours  Qty: 15 mL    Comments: If Lantus is not covered by insurance, may substitute Basaglar or Semglee or other insulin glargine product per insurance preference at same dose and frequency.    Associated Diagnoses: Type 2 diabetes mellitus with hyperglycemia, with long-term current use of insulin (H)           CONTINUE these medications which have CHANGED    Details   !! bumetanide (BUMEX) 1 MG tablet Take 1 tablet (1 mg) by mouth daily  Qty: 180 tablet, Refills: 3    Associated Diagnoses: Chronic diastolic heart failure (H)      !! bumetanide (BUMEX) 1 MG tablet Take 1 tablet (1 mg) by mouth daily as needed (weight gain 5 lbs in 1 day or 10 lbs in 1 week)    Associated Diagnoses: Acute HFrEF (heart failure with reduced ejection fraction) (H)      gabapentin (NEURONTIN) 300 MG capsule Take 2 capsules (600 mg) by mouth 2 times daily  Qty: 270 capsule, Refills: 1    Associated Diagnoses: Type 2 diabetes mellitus with hyperglycemia, with  long-term current use of insulin (H)      !! insulin aspart (NOVOLOG PEN) 100 UNIT/ML pen Inject 1-21 Units Subcutaneous 3 times daily (with meals)  Qty: 15 mL    Comments: Correction Scale - VERY HIGH INSULIN RESISTANCE DOSING     Do Not give Correction Insulin if Pre-Meal BG less than 140.   For Pre-Meal  - 149 give 1 unit.   For Pre-Meal  - 159 give 2 units.   For Pre-Meal  - 169 give 3 units.   For Pre-Meal  - 179 give 4 units.   For Pre-Neha  Associated Diagnoses: Type 2 diabetes mellitus with hyperglycemia, with long-term current use of insulin (H)      !! insulin aspart (NOVOLOG PEN) 100 UNIT/ML pen Prior to breakfast and lunch, give 1 units per 3 grams of carbohydrate.   Do not give if pre-prandial glucose is less than 60 mg/dL.  Qty: 15 mL    Associated Diagnoses: Type 2 diabetes mellitus with hyperglycemia, with long-term current use of insulin (H)      !! insulin aspart (NOVOLOG PEN) 100 UNIT/ML pen Prior to supper, give 1 units per 4 grams of carbohydrate.   Do not give if pre-prandial glucose is less than 60 mg/dL.  Qty: 15 mL    Associated Diagnoses: Type 2 diabetes mellitus with hyperglycemia, with long-term current use of insulin (H)      !! levothyroxine (SYNTHROID/LEVOTHROID) 200 MCG tablet Take 2 tablets (400 mcg) by mouth once a week    Associated Diagnoses: Hypothyroidism, unspecified type      !! levothyroxine (SYNTHROID/LEVOTHROID) 200 MCG tablet Take 1 tablet (200 mcg) by mouth six times a week    Associated Diagnoses: Hypothyroidism, unspecified type      metoprolol succinate ER (TOPROL XL) 50 MG 24 hr tablet Take 1 tablet (50 mg) by mouth daily    Associated Diagnoses: Atrial fibrillation with rapid ventricular response (H); Acute HFrEF (heart failure with reduced ejection fraction) (H)      nicotine (NICODERM CQ) 14 MG/24HR 24 hr patch Place 1 patch onto the skin daily    Associated Diagnoses: Tobacco abuse       !! - Potential duplicate medications found. Please  discuss with provider.        CONTINUE these medications which have NOT CHANGED    Details   acetaminophen (TYLENOL) 325 MG tablet Take 2 tablets (650 mg) by mouth 3 times daily as needed for mild pain or fever (total acetaminophen dose should not exceed 3000 mg per day)  Qty: 180 tablet, Refills: 0    Comments: Call clinic to schedule follow up appointment.[phone]  Associated Diagnoses: Neuropathic pain of lower extremity, unspecified laterality      albuterol (PROVENTIL) (2.5 MG/3ML) 0.083% neb solution Take 1 vial (2.5 mg) by nebulization every 6 hours as needed for shortness of breath / dyspnea or wheezing  Qty: 90 mL, Refills: 1    Associated Diagnoses: Shortness of breath      albuterol (VENTOLIN HFA) 108 (90 Base) MCG/ACT inhaler Inhale 2 puffs into the lungs every 6 hours as needed for shortness of breath / dyspnea  Qty: 18 g, Refills: 1    Comments: Pharmacy may dispense brand covered by insurance (Proair, or proventil or ventolin or generic albuterol inhaler)  Associated Diagnoses: Mild intermittent asthma without complication      apixaban ANTICOAGULANT (ELIQUIS) 5 MG tablet Take 1 tablet (5 mg) by mouth 2 times daily  Qty: 180 tablet, Refills: 3    Associated Diagnoses: Acute on chronic heart failure with preserved ejection fraction (H); Dilated cardiomyopathy (H)      atorvastatin (LIPITOR) 40 MG tablet Take 1 tablet (40 mg) by mouth daily  Qty: 90 tablet, Refills: 3    Associated Diagnoses: Dyslipidemia      capsaicin (ZOSTRIX) 0.025 % external cream Apply 1 g topically 3 times daily as needed (use for neuropathy pain)  Qty: 1 Tube, Refills: 1    Associated Diagnoses: Dermatophytosis of nail      ciclopirox (LOPROX) 0.77 % cream Apply topically 2 times daily To feet and toenails.  Qty: 90 g, Refills: 6    Associated Diagnoses: Onychomycosis; Tinea pedis of both feet      clotrimazole (LOTRIMIN) 1 % external cream Apply topically 2 times daily as needed (skin irritation)  Qty: 30 g, Refills: 0     Associated Diagnoses: Furuncle of abdominal wall      diclofenac (VOLTAREN) 1 % topical gel Apply 4 grams to knees or 2 grams to hands four times daily using enclosed dosing card.  Qty: 100 g, Refills: 1    Comments: Call clinic to schedule follow up appointment.[phone]  Associated Diagnoses: Left foot pain      docusate sodium (COLACE) 100 MG tablet Take 100 mg by mouth daily as needed for constipation      Efinaconazole 10 % SOLN Externally apply topically daily To toenails.  Qty: 8 mL, Refills: 11    Comments: Advise patient due for follow up with provider  Associated Diagnoses: Dermatophytosis of nail      insulin pen needle (BD RIVER U/F) 32G X 4 MM miscellaneous Use 6 daily or as directed  Qty: 600 each, Refills: 3    Associated Diagnoses: Diabetes mellitus, type 2 (H)      nicotine (NICOTROL) 10 MG/ML SOLN inhalation solution Spray 1 spray in nostril every hour as needed for smoking cessation      nystatin (MYCOSTATIN) 867037 UNIT/GM external cream Apply topically 2 times daily To toenails  Qty: 90 g, Refills: 6    Associated Diagnoses: Dermatophytosis of nail      polyethylene glycol (MIRALAX/GLYCOLAX) powder Take 17 g by mouth daily as needed for constipation      prednisoLONE acetate (PRED FORTE) 1 % ophthalmic suspension As directed 1 Drop 3 times daily. Follow physician instructions for the operative eye.  Qty: 5 mL, Refills: 1    Associated Diagnoses: Pseudophakia of right eye      Semaglutide, 1 MG/DOSE, 4 MG/3ML SOPN Inject 1 mg Subcutaneous once a week  Qty: 9 mL, Refills: 3    Associated Diagnoses: BMI 60.0-69.9, adult (H); Type 2 diabetes mellitus with hyperglycemia, with long-term current use of insulin (H)      tiotropium (SPIRIVA HANDIHALER) 18 MCG inhalation capsule Inhale contents of one capsule daily.  Qty: 30 capsule, Refills: 1    Associated Diagnoses: COPD (chronic obstructive pulmonary disease) (H)      topiramate (TOPAMAX) 25 MG tablet 25 mg at bedtime for 1 week, 50 mg at bedtime  for 1 week and 75 mg daily at bedtime thereafter  Qty: 180 tablet, Refills: 1    Associated Diagnoses: BMI 60.0-69.9, adult (H)      !! ACCU-CHEK RON PLUS test strip USE TO TEST BLOOD SUGAR FOUR TIMES A DAY  OR AS DIRECTED.  Qty: 360 strip, Refills: 2    Associated Diagnoses: Type 2 diabetes mellitus (H)      blood glucose (NO BRAND SPECIFIED) lancets standard USE TO CHECK  blood sugar fasting each am  prelunch and predinner daily OR AS DIRECTED Call clinic to schedule MD APPT.  Qty: 400 each, Refills: 3    Associated Diagnoses: Diabetes mellitus, type 2 (H)      !! blood glucose (NO BRAND SPECIFIED) test strip Use to test blood sugar 4 times daily or as directed. Accu check Ron plus  Qty: 400 strip, Refills: 1    Associated Diagnoses: Type 2 diabetes mellitus (H)      !! blood glucose (NO BRAND SPECIFIED) test strip Use to test blood sugar 4 times daily or as directed.  Qty: 400 strip, Refills: 3    Comments: For accu chek ron  Associated Diagnoses: Type 2 diabetes mellitus with hyperglycemia, with long-term current use of insulin (H)      !! blood glucose (NO BRAND SPECIFIED) test strip Use to test blood sugars 3 times daily or as directed  Qty: 400 strip, Refills: 3    Associated Diagnoses: Diabetes mellitus without complication (H)      blood glucose monitoring (ACCU-CHEK FASTCLIX) lancets Use to test blood sugar 4 times daily or as directed.or lancets that go with meter being used  Qty: 360 each, Refills: 3    Associated Diagnoses: Diabetes mellitus without complication (H)      blood glucose monitoring (IBG STAR) meter device kit -PLEASE GIVE PATIENT A DEVICE HER INSURANCE WILL COVER-  Qty: 1 kit, Refills: 0    Associated Diagnoses: Type 2 diabetes mellitus with hyperglycemia, with long-term current use of insulin (H)      !! blood glucose monitoring (NO BRAND SPECIFIED) meter device kit Use to test blood sugar 4  times daily or as directed.  Qty: 1 kit, Refills: 0    Associated Diagnoses: Type 2  diabetes mellitus (H)      !! blood glucose monitoring (NO BRAND SPECIFIED) meter device kit Use to test blood sugar 3 times daily or as directed.  Qty: 1 kit, Refills: 1    Associated Diagnoses: Diabetes mellitus, type 2 (H)      Blood Pressure KIT Use to check blood pressure as directed, once daily and as needed. Record results.  Qty: 1 kit, Refills: 1    Associated Diagnoses: Diabetes mellitus without complication (H); SOB (shortness of breath); Morbid obesity (H); Hypothyroidism following radioiodine therapy      !! Continuous Blood Gluc Sensor (FREESTYLE BELLO 14 DAY SENSOR) MISC Change every 14 days.  Qty: 2 each, Refills: 11    Associated Diagnoses: Diabetes mellitus without complication (H)      !! Continuous Blood Gluc Sensor (FREESTYLE BELLO 14 DAY SENSOR) MISC 1 each every 14 days  Qty: 2 each, Refills: 3    Associated Diagnoses: Type 2 diabetes mellitus with hyperglycemia, with long-term current use of insulin (H)      !! Continuous Blood Gluc Sensor (FREESTYLE EBLLO 2 SENSOR) MISC 1 Units every 14 days Check BG 4 times daily before meals and at bedtime.  Qty: 6 each, Refills: 3    Associated Diagnoses: Type 2 diabetes mellitus with hyperglycemia, with long-term current use of insulin (H)      glucagon 1 MG SOLR injection Inject 1 mg Subcutaneous every 15 minutes as needed for low blood sugar  Qty: 10 each, Refills: 11    Associated Diagnoses: Diabetes mellitus without complication (H)      !! order for DME Left foot  Qty: 1 Units, Refills: 0    Associated Diagnoses: Left foot pain; Diabetic neuropathy with neurologic complication (H)      !! order for DME Equipment being ordered:  [id number] $92   Plantar, fasciitis, LG, night splint  Qty: 1 each, Refills: 0    Associated Diagnoses: Dermatophytosis of nail; Plantar fasciitis of right foot; Diabetic neuropathy with neurologic complication (H); Peroneal tendinitis, right      !! order for DME Equipment being ordered: Challenger Wide walker if  available - patient needs seat, basket and brakes.  Qty: 1 each, Refills: 0    Associated Diagnoses: Dilated cardiomyopathy (H); Chronic systolic heart failure (H)      !! ORDER FOR DME Use your CPAP device as directed by your provider. Pressure change to min 13 max 18cwp  Qty: 1 each, Refills: 99      Respiratory Therapy Supplies (NEBULIZER COMPRESSOR) KIT 1 Device 4 times daily as needed.  Qty: 1 kit, Refills: 3    Associated Diagnoses: COPD (chronic obstructive pulmonary disease) (H)       !! - Potential duplicate medications found. Please discuss with provider.        STOP taking these medications       bisacodyl (DULCOLAX) 10 MG suppository Comments:   Reason for Stopping:         colchicine (COLCYRS) 0.6 MG tablet Comments:   Reason for Stopping:         hydrALAZINE (APRESOLINE) 25 MG tablet Comments:   Reason for Stopping:         hydrochlorothiazide (HYDRODIURIL) 12.5 MG tablet Comments:   Reason for Stopping:         insulin glargine U-300 (TOUJEO MAX SOLOSTAR) 300 UNIT/ML (2 units dial) pen Comments:   Reason for Stopping:         isosorbide dinitrate (ISORDIL) 20 MG tablet Comments:   Reason for Stopping:         oxyCODONE (ROXICODONE) 5 MG tablet Comments:   Reason for Stopping:         oxyCODONE-acetaminophen (PERCOCET) 5-325 MG per tablet Comments:   Reason for Stopping:         spironolactone (ALDACTONE) 50 MG tablet Comments:   Reason for Stopping:               Follow-up:  - PCP 7-10 days for post hospitalization visit, med change eval  - Following appt after ARU discharge:   - CORE/Cardiology for HFmrEF follow up   - Vascular surgery for right toe wound/necrosis follow up   - Endocrinology for T2DM, hypothyroidism follow up   - Urology for urinary retention, voiding trial    Labs or imaging requiring follow-up after discharge:  BMP 1 week    Code status:  Full    Condition on discharge  Temp:  [96.3  F (35.7  C)-98.4  F (36.9  C)] 98.4  F (36.9  C)  Pulse:  [] 101  Resp:  [16-18] 18  BP:  (100-110)/(69-84) 110/76  FiO2 (%):  [30 %] 30 %  SpO2:  [99 %-100 %] 100 %  General: Alert, interactive, NAD  Eyes: sclera anicteric, EOMI  Neck: no JVD, carotid 2+ bilaterally  Cardiovascular: irregularly irregular rhythm, rate controlled, normal S1 and S2, no murmurs, gallops, or rubs  Resp: clear to auscultation bilaterally, no rales, wheezes, or rhonchi  GI: Soft, nontender, nondistended. +BS.  No HSM or masses, no rebound or guarding.  Extremities: no edema, no cyanosis or clubbing, dorsalis pedis and posterior tibialis pulses 2+ bilaterally  Skin: Warm and dry, necrosis right big toe, 2nd toe, 4th toe, skin bilateral feet very dry, flaky  Neuro: CN 2-12 intact, moves all extremities equally  Psych: Alert & oriented x 3    Imaging with results:  Echocardiogram 1/24/2023:  Interpretation Summary  Technically difficult study.Extremely difficult acoustic windows despite the  use of contrast for endcardial border definition.  Left ventricular function is decreased. The ejection fraction is 40-45%  (mildly reduced).  Right ventricular function cannot be assessed due to poor image quality.  IVC diameter >2.1 cm collapsing <50% with sniff suggests a high RA pressure  estimated at 15 mmHg or greater.     This study was compared with the study from 6/14/2022. No significant changes  noted.    Other imaging studies:  EKG 12 Lead 2/1/2023: AFib/Flutter, HR 86      Patient Care Team:  Mario Lockhart [facility]CP [facility] as MD (Cardiology)  Silva Damon MD as MD (Internal Medicine)  Norman Jean DPM (Podiatry)  Steph Kim PA-C as Physician Assistant (Physician Assistant)  Delmi Orellana MD as MD (Ophthalmology)  Nelly Miranda MD as MD (Ophthalmology)  Isela Archibald PA-C as Physician Assistant (Physician Assistant)  Tom Guardado MD as MD (Ophthalmology)  Russel Vergara, GIANNA as MD (Optometry)  Wick, YOCASTA Sibley CNP as Nurse  Practitioner (Cardiology)  Lauren Resendez [facility] as Pharmacist (Pharmacist Clinician- Clinical Pharmacy Specialist)  Alexandru Wang, TA as Specialty Care Coordinator (Cardiology)  Shanice Ambrocio, RN as Registered Nurse (Cardiology)  Isela Archibald PA-C as Physician Assistant (Physician Assistant)  Radha Das MD as MD (Ophthalmology)  Percy Alcala MD as Assigned Heart and Vascular Provider  Vladislav Wright MD as MD (Endocrinology, Diabetes, and Metabolism)  Lenore Roberts DO as Assigned PCP  Radha Das MD as Assigned Surgical Provider  Radha Das MD as MD (Ophthalmology)  Karen Velasquez PA-C as Assigned Endocrinology Provider  Shanice Ambrocio RN as Specialty Care Coordinator (Cardiology)    YOCASTA Aragon CNP  [facility] Cardiology  Patient discussed with staff cardiologist, Dr. Pineda, who agrees with the above documentation and plan. Documentation represents joint decision making.     Time Spent on this Encounter   I, Chantal Peterson CNP, personally saw the patient today and spent greater than 30 minutes discharging this patient.   This is a shared discharged note with YOCASTA Lynn CNP  Brief HPI:  Pricilla Brown is a 47 year old female with a history of HFmrEF (EF 40-45%) 2/2 NICM, long-standing persistent AF, HTN, type II DM, morbid obesity, asthma, JYOTI and Graves disease s/p radioiodine ablation c/b hypothyroid admitted 1/23/2023 for heart failure exacerbation with hospital course complicated by CO2 retention, transferred to ICU on 1/28/2023 for encephalopathy suspected secondary to hypercapnia in setting of aggressive diuresis and contraction alkalosis despite using diamox. Initially transferred for intubation, but patient remained on BiPAP throughout ICU admission. Improved 1/29 after holding diuretics. Patient also developed an NIRAJ, due to over diuresis, now stable on PO diuretics    Hospital  Course by Diagnosis:  # Acute on Chronic Systolic Heart Failure (EF 40-45%)  # Long-Standing Persistent Atrial Fibrllation  # Hypertension   # Troponin Elevation 2/2 CHF  Pt admitted with c/o increasing SOB, MURRAY, chest tightness, weight gain in spite of compliance with PO diuretics. NT BNP 5891 (was 2030 during most recent admission).  Lactic acid 2.2 on admission with improvement to 1.1 with diuresis. Troponin mildly elevated, 67-->75-->60 likely 2/2 CHF exacerbation; no concern for ACS currently. Most recent Echo with EF 40-45%. Coronary angiogram 2012 with no significant CAD. Of note, pt recently admitted 12/25-1/1 HF exacerbation, diuresed to 472lbs. Pt weight on admission 1/23 555lbs.     - Volume Status: euvolemic, continue Bumex 1 mg daily, with additional prn dose for weight gain   - Weight on day of discharge: 450 lbs  - Given Cr/BUN trend, we believe EDW is around 445-450 lbs  - Beta Blocker: Continue Toprol XL 25mg daily  - ACEi/ARB/ARNI: Discontinue Lisinopril d/t allergy, if BP elevated, can trial Losartan    - SGLT2i: Contraindicated with recurrent yeast infections   - Aldosterone Antagonist: Discontinue Aldactone due to NIRAJ, hyperkalemia  - Rate Control: BB as above. Given mildly reduced EF, ok to use Diltiazem 120 mg BID  - Anticoagulation: CHADSVASC 3; Continue PTA apixaban  - Daily Weights; call [facility] clinic if weight increases 5 lbs in a day two days in a row or 10 lbs in 1 week   - Low Na Diet / 2L Fluid Restriction  - CORE consulted; follow up after ARU discharge Should discuss CardioMEMS at this appointment.      # Chronic Kidney Disease Stage 3b  # NIRAJ, improving  Longstanding history of diabetes and HTN. Baseline Creatinine appears to be around 1.2-1.4. Creatinine on admission 1.56. During hospitalization, patient diuresed to 427 lbs with NIRAJ, Cr peak 2.1. After backing off on diuretics, Cr improved, today 1.4  - Avoid nephrotoxic agents  - Resume Diuretics as listed above  - Repeat BMP 1  week    # Hyperkalemia, resolved  - Potassium continually rising since 2/9 with peak 2/11 @ 7.3  - EKG without acute changes, rhythm stable in a fib  - Potassium shifted with insulin, calcium gluconate for cardiac stability, lokelma 15 g X 1, IV Bumex total of 6 mg on 2/11/23  - Nephrology consulted, appreciate recs  - Discontinued spironolactone  - K 4.4 on day of discharge     # Graves Disease s/p Radioiodine Ablation  # Hypothyroidism 2/2 Radioiodine Ablation  TSH 20.4 and free T4 0.82 on admission.   - Endocrine consulted; recommendations below   - Continue levothyroxine 200 mcg M-Sat- 400 mcg on Sunday.    - Recheck TFT in a month     # Type 2 Diabetes Mellitus  # Morbid obesity  A1c 13.5% on 12/25/2022. Discrepancies in patient's home regimen. Endocrinology consulted during admission, signed off, and re-consulted 2/1/23 -- BGs range 170-420  - Appreciate ongoing recommendations  - Insulin gtt started 2/1 per endocrinology, transitioned back to subcutaneous lantus and novolog on 2/3/23.   - Discharge recommendations per endocrinology; Lantus 70 units daily, Aspart 1:3 units with breakfast and lunch, 1:4 units for dinner, Aspart very high sliding scale  - OP follow up Thelma Archibald/Dr Silva Damon     # Right Toe Wound  Present on admission, right great toe and 3rd toe, dry gangrene, pt unaware how long wounds have been present.   - WOCN following: wound cares to right toe daily. Paint toes with betadine. Be sure to apply over dry eschar. Ok to leave open to air. No soaking feet.  - Vascular consulted; JOSETTE's completed showing patent vessels and normal waveforms. They recommend orthopedic foot and ankle consult   - Ortho consulted; plan for outpatient follow up  - Continue with LE elevation, wound cares    # Acute simple cystitis, resolved  Positive UA 2/2/23 and urine dicoloration.   - Completed course of Macrobid 100mg BID x5 days through 2/7    # Urinary Retention  Keenan catheter placed on admission for  strict I/O while aggressively diuresing. Removed 2/11, pt with urinary retention and straight cath x 3, Phan replaced. Urology curbside, continue phan for now, can re-attempt voiding trial as OP  - Follow up with OP Urology     # Asthma  Patient endorsing mouth irritation w/ inhaler use. Roof of mouth erythematous and appears excoriated but w/o evidence of yeast.   - Continue PTA inhalers (levalbuterol and spiriva)     # JYOTI  - Continue BiPAP at night    # Hyperlipidemia   - Continue lipitor 40 mg daily     # Chronic Pain  Patient reporting pain everywhere. Has been using narcotics along with shae, topical nsaids, and muscle relaxants. Aggressive diuresis vs diabetic neuropathy.  - Pain management curbside consulted 1/31; decrease gabapentin to BID, stop flexeril    Consults:  Endocrinology  Vascular Surgery  Orthopedics  Pain Management  CORE  WOCN    Condition on discharge  Temp:  [96.3  F (35.7  C)-98.4  F (36.9  C)] 98.4  F (36.9  C)  Pulse:  [] 101  Resp:  [16-18] 18  BP: (100-110)/(69-84) 110/76  FiO2 (%):  [30 %] 30 %  SpO2:  [99 %-100 %] 100 %    Physical exam  General: Alert, interactive, NAD  Eyes: sclera anicteric, EOMI  Neck: no JVD, carotid 2+ bilaterally  Cardiovascular: irregularly irregular rhythm, rate controlled, normal S1 and S2, no murmurs, gallops, or rubs  Resp: clear to auscultation bilaterally, no rales, wheezes, or rhonchi  GI: Soft, nontender, nondistended. +BS.  No HSM or masses, no rebound or guarding.  Extremities: no edema, no cyanosis or clubbing, dorsalis pedis and posterior tibialis pulses 2+ bilaterally  Skin: Warm and dry, necrosis right big toe, 2nd toe, 4th toe, skin bilateral feet very dry, flaky  Neuro: CN 2-12 intact, moves all extremities equally  Psych: Alert & oriented x 3    I saw and evaluated patient with NP during discharge. I examined patient with NP. I discussed the results with patient and NP. I discussed our therapeutic plan with patient and NP.  I agree  with NP's note and I edited the NP's note to make it a more comprehensive document.    35 min were spent directly with patient and CNP during discharge process.    Pedro Pineda MD, PhD  Professor of Medicine  Division of Cardiology

## 2023-02-06 NOTE — PROGRESS NOTES
[facility]   Cardiology   Progress Note     ASSESSMENT/PLAN:  Pricilla Brown is a 47 year old female with a history of HFmrEF (EF 40-45%) 2/2 NICM, long-standing persistent AF, HTN, type II DM, morbid obesity, asthma, JYOTI and Graves disease s/p radioiodine ablation c/b hypothyroid admitted 1/23/2023 for heart failure exacerbation with hospital course complicated by CO2 retention and being obtunded, transferred to ICU on 1/28/2023 for encephalopathy suspected secondary to hypercapnia in setting of aggressive diuresis and contraction alkalosis despite using diamox. Initially transferred for intubation, but patient remained on BiPAP throughout ICU admission. Improved 1/29 after holding diuretics. Currently net negative -60L and ~90lbs since admission, tolerating PO diuretics, pending TCU placement.    Changes Today:  - Stable diuresis, -4L/24h  - No need for RHC  - Pending TCU placements    # Acute on Chronic Systolic Heart Failure (EF 40-45%)  # Long-Standing Persistent Atrial Fibrllation  # Hypertension   # Troponin Elevation 2/2 CHF  Pt admitted with c/o increasing SOB, MURRAY, chest tightness, weight gain in spite of compliance with PO diuretics. NT BNP 5891 (was 2030 during most recent admission).  Lactic acid 2.2 on admission with improvement to 1.1 with diuresis. Troponin mildly elevated, 67-->75-->60 likely 2/2 CHF exacerbation; no concern for ACS currently. Most recent Echo with EF 40-45%. Coronary angiogram 2012 with no significant CAD. Of note, pt recently admitted 12/25-1/1 HF exacerbation, diuresed to 472lbs. Pt weight on admission 1/23 555lbs.   - Volume Status: volume status improving, Diuresis going well and able to assess volume status adequately, no need for RHC  - Beta Blocker: Continue Toprol XL 25mg; up-titrate as needed for HR   - ACEi/ARB/ARNI: Discontinue Lisinopril d/t allergy.   - SGLT2i: Contraindicated with recurrent yeast infections   - Aldosterone Antagonist:  Continue PTA Spironolactone 50mg  - Rate Control: BB as above. Discontinue diltiazem 2/2 HF.   - Afterload reduction: Start hydralazine 10mg PO TID (hold parameters SBP <110)  - Anticoagulation: CHADSVASC 3; Continue PTA apixaban  - Strict I/O & Daily Weights  - Low Na Diet / 2L Fluid Restriction  - Daily BMPs  - CORE consulted; has appointment 2/10. Should discuss CardioMEMS at this appointment.     # Chronic Kidney Disease Stage 3b  Longstanding history of diabetes and HTN. Baseline Creatinine appears to be around 1.2-1.4. Creatinine on admission 1.56.   - Daily BMPs (see above, for diuresis)  - Avoid nephrotoxic agents  - Creatinine 1.21 today    # Graves Disease s/p Radioiodine Ablation  # Hypothyroidism 2/2 Radioiodine Ablation  TSH 20.4 and free T4 0.82 on admission.   - Endocrine consulted; recommendations below   - Continue levothyroxine 200 mcg M-Sat- 400 mcg on Sunday.    - Recheck TFT in a month    # Type 2 Diabetes Mellitus  # Morbid obesity  A1c 13.5% on 12/25/2022. Discrepancies in patient's home regimen. Endocrinology consulted during admission, signed off, and re-consulted 2/1/23 -- BGs range 170-420  - Appreciate ongoing recommendations  - Insulin gtt started 2/1 per endocrinology, transitioned back to subcutaneous lantus and novolog on 2/3/23.   - Lantus increased per endocrinology to 65 units daily per Endocrine   - Continue Novolog mealtime insulin  1:8g CHO(while inpatient)  - Continue high intensity SSI   - POCT glucose checks QID   - Hypoglycemia protocol  - Resume PTA Ozempic at discharge  - Discharge recommendations per endocrinology; Toujeo 40 at bedtime, aspart 10u w/ each meal + additional coverage based on pre-meal glucose (140-160) + 1 unit, (161-180) + 2 units, (181-200) + 3 units, and resume Ozempic 1mg weekly.    # Right Toe Wound  Present on admission, right great toe and 3rd toe, dry gangrene, pt unaware how long wounds have been present.   - WOCN following: wound cares to right  toe daily. Paint toes with betadine. Be sure to apply over dry eschar. Ok to leave open to air. No soaking feet.  - Vascular consulted; JOSETTE's completed showing patent vessels and normal waveforms. They recommend orthopedic foot and ankle consult   - Ortho consulted; plan for outpatient follow up to discuss elective amputation on 2/17/23 at 10:25 with Dr. Oscar Leahy.  - Continue with LE elevation    # Asthma  Patient endorsing mouth irritation w/ inhaler use. Roof of mouth erythematous and appears excoriated but w/o evidence of yeast.   - Continue PTA inhalers (levalbuterol and spiriva)  - Magic mouthwash ordered     # JYOTI  - Continue BiPAP at night    # Hyperlipidemia   - Continue lipitor 40 mg daily     # Pain  Patient reporting pain everywhere. Has been using narcotics along with shae, topical nsaids, and muscle relaxants. Aggressive diuresis vs diabetic neuropathy.  - Pain management curbside consulted 1/31; decrease gabapentin to BID, stop flexeril, add Robaxin 400 q6hr PRN  - Discussed the importance of transitioning off IV narcotics before discharge -- one time dilaudid for work with physical therapy, pt agreeable  - PRN Lidocaine patches    FEN: 2 gm Sodium / 2L Fluid restriction  Code status: Full  Prophylaxis: PO Eliquis  Isolation: None  Disposition: Pending TCU placement     Patient seen and discussed with Dr. Reid, who agrees with above plan.    YOCASTA Buckley, CNP  [facility] Cardiology Team  Pager [phone]/[phone]    Interval History:  Pt reports feeling good, eager to be discharged to TCU  Amenable to working with PT today    Physical Exam:  Temp:  [96.3  F (35.7  C)-98.4  F (36.9  C)] 98.4  F (36.9  C)  Pulse:  [] 101  Resp:  [16-21] 18  BP: (100-110)/(69-85) 110/76  FiO2 (%):  [30 %] 30 %  SpO2:  [99 %-100 %] 100 %    I/O:    Intake/Output Summary (Last 24 hours) at 2/6/2023 1044  Last data filed at 2/6/2023 0626  Gross per 24 hour   Intake 1117 ml   Output 5825 ml   Net -4708 ml       Wt:    Wt Readings from Last 5 Encounters:   02/06/23 (!) 193.2 kg (426 lb)   01/01/23 (!) 214.4 kg (472 lb 9.6 oz)   10/31/22 (!) 201 kg (443 lb 3.2 oz)   10/26/22 (!) 208.6 kg (459 lb 14.4 oz)   06/21/22 (!) 191 kg (421 lb)         General: NAD  HEENT:  PERRLA, EOMI.   Neck: JVD difficult to appreciate.   CV: RRR. No murmur appreciated. No rubs or gallops. Peripheral radial pulse intact.  Resp: No increased work of breathing or use of accessory muscles, breathing comfortably on room air.  Lung sounds clear throughout/bilaterally  Abdomen:  Normal active bowel sounds.  Abdomen is soft. No distension, non-tender to palpation.    Extremities: Warm. Capillary refill less than 3 sec. +2/4 radial pulses bilaterally.  +2/4 pedal pulses bilaterally. +2 pre-tibial edema. R great toe & 4th toe cyanotic and peeling.  Skin:  Warm and dry. No erythema, rashes, or diaphoresis.  Neuro: Alert and oriented x3.      Medications:    apixaban ANTICOAGULANT  5 mg Oral BID     atorvastatin  40 mg Oral Daily     bumetanide  3 mg Oral BID     capsaicin   Topical TID     gabapentin  600 mg Oral BID     heparin lock flush  5-20 mL Intracatheter Q24H     HYDROmorphone  0.2 mg Intravenous Once     insulin aspart  1-10 Units Subcutaneous TID AC     insulin aspart  1-7 Units Subcutaneous BID     insulin aspart   Subcutaneous TID w/meals     insulin glargine  65 Units Subcutaneous Daily     levothyroxine  200 mcg Oral Once per day on Mon Tue Wed Thu Fri Sat     levothyroxine  400 mcg Oral Weekly     lidocaine  2 patch Transdermal Q24H     lidocaine   Transdermal Q8H [facility]     metoprolol succinate ER  25 mg Oral Daily     miconazole   Topical BID     nicotine  1 patch Transdermal Daily    And     nicotine   Transdermal Q8H [facility]     nitroFURantoin macrocrystal-monohydrate  100 mg Oral Q12H [facility] (08/20)     polyethylene glycol  17 g Oral Daily     sennosides  8.6 mg Oral BID     sodium chloride (PF)  10-40 mL Intracatheter Q8H     spironolactone  50 mg  Oral Daily     topiramate  75 mg Oral At Bedtime     umeclidinium  1 puff Inhalation Daily       - MEDICATION INSTRUCTIONS -       ACE/ARB/ARNI NOT PRESCRIBED       sodium chloride         Labs:   CMP  Recent Labs   Lab 23  0700 23  0519 23  0254 23  23023  1618 23  1610 23  0659 23  0508 23  1618 23  1609   NA  --  131*  --   --   --  132*  --  132*  --  132*   POTASSIUM  --  4.5  --   --   --  4.7  --  4.7  --  5.4*   CHLORIDE  --  91*  --   --   --  91*  --  92*  --  92*   CO2  --  29  --   --   --  31*  --  31*  --  28   ANIONGAP  --  11  --   --   --  10  --  9  --  12   * 170* 170* 269*   < > 179*   < > 185*   < > 255*   BUN  --  49.9*  --   --   --  50.0*  --  51.8*  --  48.6*   CR  --  1.21*  --   --   --  1.23*  --  1.28*  --  1.26*   GFRESTIMATED  --  55*  --   --   --  54*  --  51*  --  52*   JUSTIN  --  9.1  --   --   --  9.2  --  8.6  --  9.0   MAG  --  2.1  --   --   --  2.1  --  2.1  --  2.0    < > = values in this interval not displayed.     CBC  Recent Labs   Lab 23  0519 23  0508 23  0431 23  0444   WBC 6.8 6.5 8.0 7.5   RBC 4.80 4.66 4.75 4.87   HGB 14.1 13.7 14.0 14.2   HCT 46.8 45.2 45.9 47.4*   MCV 98 97 97 97   MCH 29.4 29.4 29.5 29.2   MCHC 30.1* 30.3* 30.5* 30.0*   RDW 16.1* 16.3* 16.4* 16.8*    254 249 221     INRNo lab results found in last 7 days.  Arterial Blood GasNo lab results found in last 7 days.    Diagnostics:  23 EC/24/23 Echo:  Technically difficult study.Extremely difficult acoustic windows despite the  use of contrast for endcardial border definition.  Left ventricular function is decreased. The ejection fraction is 40-45%  (mildly reduced).  Right ventricular function cannot be assessed due to poor image quality.  IVC diameter >2.1 cm collapsing <50% with sniff suggests a high RA pressure  estimated at 15 mmHg or greater.     This study was compared with the study  from 6/14/2022. No significant changes  Noted.    1/27/23 Right JOSETTE:                                                                  IMPRESSION: Negative study. Triphasic waveforms and adequate  velocities in the right posterior tibial, anterior tibial, and  dorsalis pedis arteries.    No results found for this or any previous visit (from the past 24 hour(s)).    Medical Decision Making       35 MINUTES SPENT BY ME on the date of service doing chart review, history, exam, documentation & further activities per the note.

## 2023-02-06 NOTE — PLAN OF CARE
Goal Outcome Evaluation:  [id number]:  HX:47 year old female with PMHx of HFrEF (EF 20-25%, 12/2022) 2/2 NICM (diagnosed 2012), long-standing persistent AF (on Apxiban), HTN, insulin-dependent type II DM, morbid obesity, asthma, JYOTI and Graves disease s/p radioiodine ablation c/b hypothyroid, who was admitted on 1/23/2023 for ADHF.       Cardiac:A-flutter 100s  VS:VSS. MAPs 80s  IV:PIVx2-SL  Tubes:NA  Neuro:A/Ox4  Resp:Denies shortness of breath, on RA.  GI/:Keenan catheter in place. No BM this shift. Passing flatus. Refused mirilax. Took senna.  Nutrition:Consistent CHO, 2gm Na, 2L FR diet with good input. Sliding scale for correction and snacks. On lantus.  Endo:fsg covered w/sliding scale.   Skin:Right foot with skin peeling off of toes and top of foot. Smaller area on left foot proximal to 3rd and 4th toes. Cleaned with betadine. Loosely covered with kerlix prior to slippers placed. Scattered scabbed areas, clean and dry.  Activity:turned for bath, and up in chair by PT. OT to come at 1500.   Pain:C/O out hand pain, and foot aching. Treated with atarax, robaxin and 1x hydromorphone.   Social:No visitors present. Has cell phone at bedside.   Plan:Stable weight. Continue bumex as ordered. Encourage increased activity ad participation in cares. Discharge to TCU when bed available.

## 2023-02-07 NOTE — PROGRESS NOTES
[facility]  Diabetes Consult Brief Daily Progress Note           Assessment and Plan:   Assessment:  Pricilla Brown is a 47 year old female with a history of HFmrEF (EF 40-45%) 2/2 NICM, long-standing persistent AF, HTN, type II DM, morbid obesity, asthma, JYOTI and Graves disease s/p radioiodine ablation c/b hypothyroid admitted 1/23/2023 for heart failure exacerbation with hospital course complicated by CO2 retention and being obtunded, transferred to ICU on 1/28/2023 for encephalopathy suspected secondary to hypercapnia in setting of aggressive diuresis and contraction alkalosis despite using diamox. Initially transferred for intubation, but patient remained on BiPAP throughout ICU admission. Improved 1/29 after holding diuretics. Now on room air.         Assessment:      1)  Type II diabetes c/b insulin resistance, diabetic neuropathy/nephropathy; sub optimal control (A1c 13.5%)  2)  Acute persistent hyperglycemia   3)  Labile blood sugars   4)  Morbid obesity; BMI 74  5)  LE edema        Plan:       -  Increase Lantus 70u at 0800    -   Novolog 1:8 g cho TID AC/HS meals/snacks    -   BG  TID AC/HS/02    -   Novolog high resistance sliding scale insulin    -   Recommend Podiatry for diab foot exam      -   PTA med: not taking Ozempic but should resume outpatient     -   Hypoglycemia protocol    -   Recommend carb counting protocol    -   Education :  TBD - not anticipated     -   Outpatient follow up:  recommend [facility] - needs to re-schedule with Dr. Silva Damon            Hx Graves s/p ROGER ablation  Hypothyroidism  Hx non-adherence to LT4  TSH 20 on admission, resumed her PTA dose 200mcg 6 days week, 400mcg once weekly  Recheck TFTs in 6-8 weeks outpatient  Discussed importance of being adherent to LT4, effect on cardiac issues    Will continue to follow.

## 2023-02-07 NOTE — PLAN OF CARE
Goal Outcome Evaluation:  [id number]:  HX:47 year old female with PMHx of HFrEF (EF 20-25%, 12/2022) 2/2 NICM (diagnosed 2012), long-standing persistent AF (on Apxiban), HTN, insulin-dependent type II DM, morbid obesity, asthma, JYOTI and Graves disease s/p radioiodine ablation c/b hypothyroid, who was admitted on 1/23/2023 for ADHF.        Cardiac:A-fib 100s, increased to 130-150s at 1100 with activity. Still in 130s while sitting in bed provider aware. Metoprolol increased prior to activity this AM  VS: MAPs 90s. Provider aware. Started on hydralazine yesterday.  IV:PIVx2-SL  Tubes:NA  Neuro:A/Ox4  Resp:Denies shortness of breath, on RA.  GI/:Good UO with phan catheter. Bumex dose decreased. Phan removed this afternoon. Purewick placed. States that it has worked in the past if placed properly. Feels like it is in the right place. Informed to call staff prior to voiding so that we can assess appropriate placement. No BM this shift. Passing flatus. Refused mirilax. Took senna.  Nutrition:Consistent CHO, 2gm Na, 2L FR diet with good input. Sliding scale for correction and snacks. On lantus.  Endo:FSG covered w/sliding scale and carb coverage. FSG high 399 prior to lunch. Provider aware. Has candy at bedside and told to inform RN when she had some to received snack carb coverage.    Skin:Right foot with skin peeling off of toes and top of foot. Smaller area on left foot proximal to 3rd and 4th toes. Cleaned with betadine. Loosely covered with kerlix prior to slippers placed. Scattered scabbed areas, clean and dry.  Activity:Turned for bath, and up in chair by PT and back to bed after lunch. OT to come at 1500.   Pain:C/O hand pain, and foot aching. Treated with, robaxin, 1x hydromorphone and capsaisin cream.  Social:No visitors present. Has cell phone at bedside.   Plan: Monitor HR and VS. Remind to call RN with extra PO consumed. Stable weight. Continue bumex as ordered. Encourage increased activity and  participation in cares. Discharge to TCU when bed available.

## 2023-02-07 NOTE — PROGRESS NOTES
Care Management Follow Up    Length of Stay (days): 15    Expected Discharge Date: 02/07/2023     Concerns to be Addressed: discharge planning     Patient plan of care discussed at interdisciplinary rounds: Yes    Anticipated Discharge Disposition: TCU      Anticipated Discharge Services:  TCU therapy services  Anticipated Discharge DME:  n/a    Patient/family educated on Medicare website which has current facility and service quality ratings:  yes  Education Provided on the Discharge Plan:  yes  Patient/Family in Agreement with the Plan:  yes    Referrals Placed by CM/SW:    DENIALS  2/3--- [facility] (no bed until Tuesday, SW to re-send referral then)  2/3--- [facility] (not able to meet bariatric needs)  2/3--- [facility] (not able to meet bariatric needs)  2/6--- [facility] (not able to meet bariatric needs)  2/6--- [facility] (no available TCU beds anytime soon)  2/6--- [facility] (no bariatric beds available)  2/6--- [facility] (bariatric limit is 350 lbs so not able to meet the needs of the pt)  [facility] (not [facility]'s [facility] needs)     PENDING  [facility]  [address] [place], [place] [address]. [place] [address]  Phone: [phone]  Admissions: [phone]   Fax: [phone]    [facility]  [address] [place] [address]  [address]  Phone: [phone]  Fax: [phone]    [facility]  [address]  [address]  Phone: [phone]  Fax: [phone]    NEW REFERRALS (sent via Epic's destination tab on 2/7/2023)  - [facility]  - [facility]  - [facility]   - [facility]  - [facility]  Private pay costs discussed: Not applicable    Additional Information:  [facility]GO is following for discharge planning.    MARGO met with the pt at bedside with OT in the room. SW updated the pt on discharge planning and reason for  denials. Pt was tearful for a bit and expressed her motivation for therapies. OT did mention that after discussion with PT, the pt could progress to an ARU discharge.    See above in the referrals section for updates.    ___________________    DANIEL Villagomez, SW  6C   [facility]  Phone: [phone]  Pager: [phone]

## 2023-02-07 NOTE — PLAN OF CARE
"/76 (BP Location: Right arm, Cuff Size: Adult Regular)   Pulse 114   Temp 98.7  F (37.1  C) (Oral)   Resp 21   Ht 1.702 m (5' 7\")   Wt (!) 191.4 kg (422 lb)   SpO2 97%   BMI 66.09 kg/m      Shift: 2300 - 0730  VS: Stable on 2 L nasal cannula, afebrile  Cardiac: Telemetry monitoring in progress. HR: 100s - 110s, blood pressures have been normotensive.  Neuro: AOx4  BG: ACHS (279)  Labs: AM labs have been collected, results are pending at this time.  Respiratory: Dyspnea on exertion, lung sounds are diminished throughout.  Pain/Nausea/PRN: Pt denies nausea. PRN percocet administered x1 for pain.  Diet: 2 gram sodium diet with 2 L fluid restriction  LDA: L PIV (SL), L PIV (SL)  GI/: Keenan catheter in place (good urine output), LBM: 2/6  Skin: No new findings  Mobility: Lift assist  Plan: Pt is medically ready for discharge and is awaiting TCU placement at this time.    Will give report to oncoming nurse. Pt left in stable condition, care relinquished at this time.       "

## 2023-02-07 NOTE — PLAN OF CARE
I/A: A/Ox4. VSS on RA, bipap at HS. Afib/flutter, tachy. Hand and foot pain managed with heat, tylenol, robaxin, atarax, and percocet. Mod consistent CHO/ 2g Na diet with 2L FR, ordering outside food from [employer]. BG ACHS, carb coverage. Voiding well w/ PO bumex via Keenan. LBM 2/6, suppository given. Lift assist.     P: Awaiting TCU placement. Wound cares. Encourage activity. Endo following.    Hours of care: [id number]

## 2023-02-07 NOTE — PROGRESS NOTES
[facility]   Cardiology   Progress Note     ASSESSMENT/PLAN:  Pricilla Brown is a 47 year old female with a history of HFmrEF (EF 40-45%) 2/2 NICM, long-standing persistent AF, HTN, type II DM, morbid obesity, asthma, JYOTI and Graves disease s/p radioiodine ablation c/b hypothyroid admitted 1/23/2023 for heart failure exacerbation with hospital course complicated by CO2 retention and being obtunded, transferred to ICU on 1/28/2023 for encephalopathy suspected secondary to hypercapnia in setting of aggressive diuresis and contraction alkalosis despite using diamox. Initially transferred for intubation, but patient remained on BiPAP throughout ICU admission. Improved 1/29 after holding diuretics. Currently net negative -60L and ~90lbs since admission, tolerating PO diuretics, pending TCU placement.    Changes Today:  - Stable diuresis, -1.6L/24h  - Decrease Bumex PO to 2mg  - Pending TCU placements    # Acute on Chronic Systolic Heart Failure (EF 40-45%)  # Long-Standing Persistent Atrial Fibrllation  # Hypertension   # Troponin Elevation 2/2 CHF  Pt admitted with c/o increasing SOB, MURRAY, chest tightness, weight gain in spite of compliance with PO diuretics. NT BNP 5891 (was 2030 during most recent admission).  Lactic acid 2.2 on admission with improvement to 1.1 with diuresis. Troponin mildly elevated, 67-->75-->60 likely 2/2 CHF exacerbation; no concern for ACS currently. Most recent Echo with EF 40-45%. Coronary angiogram 2012 with no significant CAD. Of note, pt recently admitted 12/25-1/1 HF exacerbation, diuresed to 472lbs. Pt weight on admission 1/23 555lbs.   - Volume Status: Euvelemic, able to assess volume status adequately, no need for RHC  - New  lbs  - Diuresis: Decrease Bumex to 2mg PO BID d/t tachycardia  - Beta Blocker: Up-titrate Toprol XL to 50mg for tachycardia in 130s  - ACEi/ARB/ARNI:No lisinopril d/t allergy.   - SGLT2i: Contraindicated with recurrent yeast  infections   - Aldosterone Antagonist: Continue PTA Spironolactone 50mg  - Rate Control: BB as above. No diltiazem 2/2 HF.   - Afterload reduction: Cont hydralazine 10mg PO TID (hold parameters SBP <110)  - Anticoagulation: CHADSVASC 3; Continue PTA apixaban  - Strict I/O & Daily Weights  - Low Na Diet / 2L Fluid Restriction  - Daily BMPs  - CORE consulted; has appointment 2/10. Should discuss CardioMEMS at this appointment.     # Chronic Kidney Disease Stage 3b  Longstanding history of diabetes and HTN. Baseline Creatinine appears to be around 1.2-1.4. Creatinine on admission 1.56.   - Daily BMPs (see above, for diuresis)  - Avoid nephrotoxic agents  - Creatinine 1.28 today (1.21)     # Graves Disease s/p Radioiodine Ablation  # Hypothyroidism 2/2 Radioiodine Ablation  TSH 20.4 and free T4 0.82 on admission.   - Endocrine consulted; recommendations below   - Continue levothyroxine 200 mcg M-Sat- 400 mcg on Sunday.    - Recheck TFT in a month    # Type 2 Diabetes Mellitus  # Morbid obesity  A1c 13.5% on 12/25/2022. Discrepancies in patient's home regimen. Endocrinology consulted during admission, signed off, and re-consulted 2/1/23 -- BGs range 180-350s  - Appreciate ongoing endo recommendations  - Insulin gtt started 2/1 per endocrinology, transitioned back to subcutaneous lantus and novolog on 2/3/23.   - Lantus increased per endocrinology to 70 units daily per Endocrine   - Continue Novolog mealtime insulin  1:8g CHO(while inpatient)  - Continue high intensity SSI   - POCT glucose checks QID   - Hypoglycemia protocol  - Resume PTA Ozempic at discharge  - Discharge recommendations per endocrinology; Toujeo 40 at bedtime, aspart 10u w/ each meal + additional coverage based on pre-meal glucose (140-160) + 1 unit, (161-180) + 2 units, (181-200) + 3 units, and resume Ozempic 1mg weekly.    # Right Toe Wound  Present on admission, right great toe and 3rd toe, dry gangrene, pt unaware how long wounds have been present.    - WOCN following: wound cares to right toe daily. Paint toes with betadine. Be sure to apply over dry eschar. Ok to leave open to air. No soaking feet.  - Vascular consulted; JOSETTE's completed showing patent vessels and normal waveforms. They recommend orthopedic foot and ankle consult   - Ortho consulted; plan for outpatient follow up to discuss elective amputation on 2/17/23 at 10:25 with Dr. Oscar Leahy.  - Continue with LE elevation    #Acute simple cystitis  Positive UA 2/2/23 and urine dicoloration.   -Macrobid 100mg BID x5 days through 2/7.     # Asthma  Patient endorsing mouth irritation w/ inhaler use. Roof of mouth erythematous and appears excoriated but w/o evidence of yeast.   - Continue PTA inhalers (levalbuterol and spiriva)  - Magic mouthwash ordered     # JYOTI  - Continue BiPAP at night    # Hyperlipidemia   - Continue lipitor 40 mg daily    # Pain  Patient reporting pain everywhere. Has been using narcotics along with shae, topical nsaids, and muscle relaxants. Aggressive diuresis vs diabetic neuropathy.  - Pain management curbside consulted 1/31; decrease gabapentin to BID, stop flexeril, add Robaxin 400 q6hr PRN  - Discussed the importance of transitioning off IV narcotics before discharge   - PRN Lidocaine patches    FEN: 2 gm Sodium / 2L Fluid restriction  Code status: Full  Prophylaxis: PO Eliquis  Isolation: None  Disposition: Pending TCU placement     Patient seen and discussed with Dr. Reid, who agrees with above plan.    Sheree Betts, APRN, CNP  [facility] Cardiology Team  Pager [phone]    Interval History:  Pt reports feeling good, eager to be discharged to TCU  Amenable to working with PT today    Physical Exam:  Temp:  [97.4  F (36.3  C)-98.7  F (37.1  C)] 98.1  F (36.7  C)  Pulse:  [] 130  Resp:  [16-25] 20  BP: ()/(69-90) 98/90  SpO2:  [95 %-100 %] 99 %    I/O:    Intake/Output Summary (Last 24 hours) at 2/7/2023 0940  Last data filed at 2/7/2023 0400  Gross per 24  hour   Intake 1730 ml   Output 2650 ml   Net -920 ml         Wt:   Wt Readings from Last 5 Encounters:   02/07/23 (!) 191.4 kg (422 lb)   01/01/23 (!) 214.4 kg (472 lb 9.6 oz)   10/31/22 (!) 201 kg (443 lb 3.2 oz)   10/26/22 (!) 208.6 kg (459 lb 14.4 oz)   06/21/22 (!) 191 kg (421 lb)         General: NAD  HEENT:  PERRLA, EOMI.   Neck: JVD not appreciated .   CV: RRR. No murmur appreciated. No rubs or gallops. Peripheral radial pulse intact.  Resp: No increased work of breathing or use of accessory muscles, breathing comfortably on room air.  Lung sounds clear throughout/bilaterally  Abdomen:  Normal active bowel sounds.  Abdomen is soft. No distension, non-tender to palpation.    Extremities: Warm. Capillary refill less than 3 sec. 2/4 radial pulses bilaterally.  1/4 pedal pulses bilaterally. +2 pre-tibial edema. R great toe & 4th toe cyanotic and peeling.  Skin:  Warm and dry. No erythema, rashes, or diaphoresis.  Neuro: Alert and oriented x3.      Medications:    apixaban ANTICOAGULANT  5 mg Oral BID     atorvastatin  40 mg Oral Daily     bumetanide  3 mg Oral BID     capsaicin   Topical TID     gabapentin  600 mg Oral BID     heparin lock flush  5-20 mL Intracatheter Q24H     hydrALAZINE  10 mg Oral TID     insulin aspart  1-10 Units Subcutaneous TID AC     insulin aspart  1-7 Units Subcutaneous BID     insulin aspart   Subcutaneous TID w/meals     insulin glargine  70 Units Subcutaneous Daily     levothyroxine  200 mcg Oral Once per day on Mon Tue Wed Thu Fri Sat     levothyroxine  400 mcg Oral Weekly     lidocaine  2 patch Transdermal Q24H     lidocaine   Transdermal Q8H OPHELIA     magnesium oxide  400 mg Oral Q4H     metoprolol succinate ER  50 mg Oral Daily     miconazole   Topical BID     nicotine  1 patch Transdermal Daily    And     nicotine   Transdermal Q8H [facility]     polyethylene glycol  17 g Oral Daily     sennosides  8.6 mg Oral BID     sodium chloride (PF)  10-40 mL Intracatheter Q8H     spironolactone   50 mg Oral Daily     topiramate  75 mg Oral At Bedtime     umeclidinium  1 puff Inhalation Daily       - MEDICATION INSTRUCTIONS -       ACE/ARB/ARNI NOT PRESCRIBED       sodium chloride         Labs:   CMP  Recent Labs   Lab 23  0745 23  0623  0232 23  2202 23  0700 23  0519 23  1618 23  1610 23  0659 23  0508   NA  --  133*  --   --   --  131*  --  132*  --  132*   POTASSIUM  --  4.6  --   --   --  4.5  --  4.7  --  4.7   CHLORIDE  --  92*  --   --   --  91*  --  91*  --  92*   CO2  --  32*  --   --   --  29  --  31*  --  31*   ANIONGAP  --  9  --   --   --  11  --  10  --  9   * 332* 279* 242*   < > 170*   < > 179*   < > 185*   BUN  --  50.7*  --   --   --  49.9*  --  50.0*  --  51.8*   CR  --  1.28*  --   --   --  1.21*  --  1.23*  --  1.28*   GFRESTIMATED  --  51*  --   --   --  55*  --  54*  --  51*   JUSTIN  --  9.1  --   --   --  9.1  --  9.2  --  8.6   MAG  --  1.8  --   --   --  2.1  --  2.1  --  2.1    < > = values in this interval not displayed.     CBC  Recent Labs   Lab 23  0622 23  0519 23  0508 23  0431   WBC 7.1 6.8 6.5 8.0   RBC 4.81 4.80 4.66 4.75   HGB 13.9 14.1 13.7 14.0   HCT 46.2 46.8 45.2 45.9   MCV 96 98 97 97   MCH 28.9 29.4 29.4 29.5   MCHC 30.1* 30.1* 30.3* 30.5*   RDW 16.1* 16.1* 16.3* 16.4*    289 254 249     INRNo lab results found in last 7 days.  Arterial Blood GasNo lab results found in last 7 days.    Diagnostics:  23 EC/24/23 Echo:  Technically difficult study.Extremely difficult acoustic windows despite the  use of contrast for endcardial border definition.  Left ventricular function is decreased. The ejection fraction is 40-45%  (mildly reduced).  Right ventricular function cannot be assessed due to poor image quality.  IVC diameter >2.1 cm collapsing <50% with sniff suggests a high RA pressure  estimated at 15 mmHg or greater.     This study was compared with the  study from 6/14/2022. No significant changes  Noted.    1/27/23 Right JOSETTE:                                                                  IMPRESSION: Negative study. Triphasic waveforms and adequate  velocities in the right posterior tibial, anterior tibial, and  dorsalis pedis arteries.     No results found for this or any previous visit (from the past 24 hour(s)).    No results found for this or any previous visit (from the past 24 hour(s)).    Medical Decision Making       35 MINUTES SPENT BY ME on the date of service doing chart review, history, exam, documentation & further activities per the note.

## 2023-02-08 NOTE — PROGRESS NOTES
Care Management Follow Up    Length of Stay (days): 16    Expected Discharge Date: 02/09/2023     Concerns to be Addressed: discharge planning     Patient plan of care discussed at interdisciplinary rounds: Yes    Anticipated Discharge Disposition: TCU      Anticipated Discharge Services:  TCU therapy services  Anticipated Discharge DME:  n/a    Patient/family educated on Medicare website which has current facility and service quality ratings:  yes  Education Provided on the Discharge Plan:  yes  Patient/Family in Agreement with the Plan:  yes     Referrals Placed by CM/SW:    DENIALS  2/3--- [facility] (no bed until Tuesday, SW to re-send referral then)  2/3--- [facility] (not able to meet bariatric needs)  2/3--- [facility] (not able to meet bariatric needs)  2/6--- [facility] (not able to meet bariatric needs)  2/6--- [facility] (no available TCU beds anytime soon)  2/6--- [facility] (no bariatric beds available)  2/6--- [facility] (bariatric limit is 350 lbs so not able to meet the needs of the pt)  2/7--- [facility] (not able to meet pt's bariatric needs)  2/8--- [facility] (no beds, bariatric needs, cost of medications)  2/8--- [facility] (no beds, bariatric needs)  2/8--- [facility] (bariatric needs)         PENDING  [facility]  [facility] [address] [place] [address]  Phone: [phone]  Admissions: [phone]   Fax: [phone]  - 2/8: SW called at 3:47pm and per admissions, they still haven't reviewed so they'll review and give the SW a call back     [facility]  [address]  [address]  Phone: [phone]  Fax: [phone]  - 2/8: SW sent Celeste a Teams message asking on a update now that PT is recommending ARU.      [facility]  [address]  [address]  Admissions: [phone]  Fax: [phone]  - 2/8: MARGO called at 3:50pm and left  a VM with admissions to call the SW back with an update.    [facility]  [address] [place] [address]  Phone: [phone]  Admissions: [phone]  Fax: [phone]  - 2/8: SW saw that the Epic fax failed so MARGO re-sent it    [facility] [address], [place] [address]  Phone: [phone]  Fax: [phone]  - 2/8: SW received a in basket message here on Epic with admissions telling the SW that they are currently reviewing the referral    [facility]  [address] [place] [address]  Admissions: [phone]  Fax: [phone]  - 2/8:  SW called admissions at 3:43pm and left a VM to call the SW back with an update.       Private pay costs discussed: Not applicable    Additional Information:  SW is following for discharge planning.    See above in the referrals section for updates.    ___________________    DANIEL Villagomez, LGSW  6C   [facility]  Phone: [phone]  Pager: [phone]

## 2023-02-08 NOTE — PLAN OF CARE
"D: HF exacerbation, has lost 120+ pounds since admission    I: Monitored vitals and assessed pt status.   Changed: straight cath for 1150 mL  Running:   PRN: Percocet x1, Robaxin x1    A: A/O x4, VSS. Aflutter, afib rates 110's-150's, team aware. Pressures 90's/70's. Pt d/t void, had to be straight cath at 2300, phan was removed at 1500. Purewick currently in. Large BM today. 2 LPIV saline locked. Interdry between folds. Mepi on butt and hip. Lidocaine patches removed from hands. Dressings removed at bedtime and SAMEER for gangrene feet. 2L FR, moderate consistent carb diet. X2 w/ lift    Vitals: BP 92/77 (BP Location: Right arm, Cuff Size: Adult Large)   Pulse (!) 126   Temp 98.2  F (36.8  C) (Oral)   Resp 20   Ht 1.702 m (5' 7\")   Wt (!) 191.4 kg (422 lb)   SpO2 98%   BMI 66.09 kg/m       P: Continue to monitor Pt status and report any significant changes to treatment team    "

## 2023-02-08 NOTE — CARE PLAN
Brief Podiatry/Ortho Note:    Received a routine consult order today for podiatry to evaluate patient for 'diabetic foot exam'. Patient was seen by Orthopedic Resident Shawn Degroot on 1/28/23 for right foot dry gangrene. Recommendations from that time are as follows:    'Assessment:  48 y/o woman with multiple medical comorbidities and ~ 3 weeks of dusky appearing Right 1st, 3rd, and 4th great toes consistent with dry gangrene. No concern for infection on exam.      Plan:  - No plan for orthopaedic surgery intervention during this admission  - Xrays of Right foot when clinically able-ordered  - Given benign appearance on exam with no concern for infection, patient can follow up outpatient to discuss elective amputation.  Referral with clinical schedulers placed to help schedule follow up.  - No dressings needed  - Continue with extremity elevation to avoid pressure injury   - WBAT on bilateral lower extremities from an orthopaedic perspective  - Orthopaedics will follow peripherally. Please do not hesitate to reach out to myself or the orthopaedic surgery resident on call if there are any questions or concerns'    Called and discussed this with consulting provider, Vladislav Disla with endocrinology. He will review further and re-page if he has any urgent concerns such as development of wet gangrene or infection. Otherwise, Podiatry will not see the patient again inpatient and will continue with previous plan for outpatient coordination of care for discussion of elective amputation.       Bonnie Mckeon PA-C  2/8/2023 9:24 AM  [facility]  Orthopaedic Surgery

## 2023-02-08 NOTE — PLAN OF CARE
Neuro: A&Ox4.  Cardiac: A-fib 110-140s. BP stable.    Respiratory: Sating >92% on RA.  GI/: Due to void. No BM this shift.   Diet/appetite: Tolerating 2gm Na diet. Eating well.  Activity:  Assist of 2 with lift, up to Recliner.  Pain: At acceptable level on current regimen. Tylenol PRN X1. Robaxin PRN X2. Percocet PRN X1  Skin: No new deficits noted.  LDA's: L PIV X2-SL. Purewick     Plan: Diltiazam added PO, HR improved by 1300 (see flowsheet). Podiatry consult. Mag replaced. Worked with PT/OT. Updated care plan.

## 2023-02-08 NOTE — CONSULTS
[facility] Nurse Inpatient Assessment     Consulted for:Wound(s): right foot   1/29- New consult- R) hip  2/1 new consult Left foot    Patient History (according to provider note(s):      Pricilla Brown is a 47 year old female with PMHx of HFrEF (EF 20-25%, 12/2022) 2/2 NICM (diagnosed 2012), long-standing persistent AF (on Apxiban), HTN, insulin-dependent type II DM, morbid obesity, asthma, JYOTI and Graves disease s/p radioiodine ablation c/b hypothyroid, who was admitted on 1/23/2023 for ADHF.     Patient was admitted last month (12/25 to 1/1/2023) for ADHF, but after discharge, she started to feel short of breath at rest. She used to be able to get up and move around the house slowly, but now unable to move at all. She also mentioned chest tightness on exertion that only lasts for about 1-2 minutes. Denies fever, chest pain at rest, cough, PND, orthopnea, palpitations, syncope or near-syncope, hemoptysis, nausea, vomiting, abdominal pain, melena, hematochezia, constipation, diarrhea, dysuria, hematuria, headaches, local weakness, numbness/tingling of hands/feet.    Areas Assessed:      Areas visualized during today's visit: Focused: and Lower extremities and hip    Wound location: R) hip- healed    Wound location: Right Toes- stable on 2/8- no changes- dried    Right lower leg 1/24 2/1    Last photo: 2/1/23  Wound due to: Unknown Etiology wound consistent with poor vasculature or frostbite. Per vascular consult JOSETTE and vasculature are sufficient for wound healing.   Wound history/plan of care: new on this admission. Dry gangrene. Patient is unaware of how long her feet have looked like this and she reports having difficulty fully visualizing her feet. Patient also has neuropathy in the feet.   2/1- edema blistering opening at edge of dry eschar on right foot.   Wound base: 100 % eschar,     Palpation of the wound bed: firm      Drainage: none     Description of  drainage: none     Measurements (length x width x depth, in cm): Unable to measure eschar located on great , 3rd, and 4th toes   Periwound skin: Intact      Color: dusky      Temperature: normal   Odor: none  Pain: denies , none  Pain interventions prior to dressing change: N/A  Treatment goal: Keep dry eschar stable to prevent wet gangrene and Protection  STATUS: stable  Supplies ordered: gathered and at bedside       2/1  Left foot, dry cracked not measured wounds small fissure like opening along crease of 1st toe.     Treatment Plan:     Right toes:  Daily  Paint toes with betadine. Be sure to apply over dry eschar. Ok to leave open to air. No soaking feet. Keep toes dry.    Left foot: Daily. Cleanse per unit routine and apply lotion to foot. Do not apply lotion to right foot toes.       R) hip: Every 3 days     Cleanse the area with NS and pat dry.    Apply No sting film barrier to periwound skin.    Cover wound with Mepilex    Change dressing Q 3 days.    Turn and reposition Q 2hrs side to side only.    Ensure pt has Lefty-cushion while sitting up in the chair.  FYI- If pt has constant incontinent loose stools needing dressing changes Q shift please discontinue the Mepilex dressing and apply criticaid barrier paste BID and PRN.    Orders: Reviewed and Updated    RECOMMEND PRIMARY TEAM ORDER: Lymphedema consult  Education provided: plan of care, wound progress and Infection prevention daily foot inspection.  Discussed plan of care with: Patient and Nurse  WOC nurse follow-up plan: every other week  Notify WOC if wound(s) deteriorate.  Nursing to notify the Provider(s) and re-consult the WOC Nurse if new skin concern.    DATA:     Current support surface: Standard  Standard gel/foam mattress (IsoFlex, Atmos air, etc)  Containment of urine/stool: Indwelling catheter  BMI: Body mass index is 65.81 kg/m .   Active diet order: Orders Placed This Encounter      Combination Diet Moderate Consistent Carb (60 g CHO per  Meal) Diet; 2 gm NA Diet     Output: I/O last 3 completed shifts:  In: 1620 [P.O.:1600; I.V.:20]  Out: 2300 [Urine:2300]     Labs:   Recent Labs   Lab 02/08/23  0708   HGB 14.2   WBC 7.9     Pressure injury risk assessment:   Sensory Perception: 3-->slightly limited  Moisture: 3-->occasionally moist  Activity: 2-->chairfast  Mobility: 2-->very limited  Nutrition: 3-->adequate  Friction and Shear: 2-->potential problem  Shane Score: 15      Contact via [employer]  Dept. Office Number: [phone]

## 2023-02-08 NOTE — PLAN OF CARE
Pricilla Brown is a 47 year old female with PMH HFmrEF (EF 40-45%) 2/2 NICM, long-standing persistent AF, HTN, type II DM, morbid obesity, asthma, JYOTI and Graves disease s/p radioiodine ablation c/b hypothyroid admitted 1/23/2023 for heart failure exacerbation with hospital course complicated by respiratory distress, transferred to ICU on 1/28/2023 for airway management in setting of encephalopathy with hypercarbia    VS: Stable on 2 L nasal cannula overnight, RA while awake, afebrile  Cardiac: Telemetry monitoring in progress. HR: 100s - 150s, team aware. blood pressures have been in low 100s-high 80s.  Neuro: AOx4  BG: ACHS  Respiratory: Dyspnea on exertion, lung sounds are diminished throughout.  Pain/Nausea/PRN: Pt denies nausea. PRN percocet & tylenol administered x1 for pain.  Diet: 2 gram sodium diet with 2 L fluid restriction  LDA: 2 L PIV - SL  GI/: Previously being bladder scanned for urinary retention. Keenan removed yesterday around 1300. LBM 2/7. Pt was able to void using purewick at 0530 this morning, but continue monitoring for UR.  Skin: No new findings. Pt foot dressings removed last night. To be redressed this morning.  Mobility: Lift assist  Plan: Pt is medically ready for discharge and is awaiting TCU placement at this time.    Goal Outcome Evaluation:      Plan of Care Reviewed With: patient    Overall Patient Progress: no change

## 2023-02-08 NOTE — PROGRESS NOTES
Diabetes Consult Daily  Progress Note         Assessment/plan:     1)  Type II diabetes c/b insulin resistance, diabetic neuropathy/nephropathy; sub optimal control (A1c 13.5%)  2)  Acute persistent hyperglycemia   3)  Labile blood sugars   4)  Morbid obesity; BMI 74  5)  LE edema        Plan:       -   Continue Lantus 70 unit(s) at 0800    -   Continue novolog 1:4 g cho TID AC/HS meals/snacks    -   BG  TID AC/HS/02    -   Novolog high resistance sliding scale insulin    -   PTA med: not taking Ozempic but should resume outpatient     -   Hypoglycemia protocol    -   Recommend carb counting protocol    -   Education :  TBD - not anticipated     -   Outpatient follow up:  recommend [facility] - needs to re-schedule with Dr. Silva Damon            Hx Graves s/p ROGER ablation  Hypothyroidism  Hx non-adherence to LT4  TSH 20 on admission, resumed her PTA dose 200mcg 6 days week, 400mcg once weekly  Recheck TFTs in 6-8 weeks outpatient  Discussed importance of being adherent to LT4, effect on cardiac issues                    Chief Complaint/HPI:     Pricilla Brown is a 47 year old female with a history of HFmrEF (EF 40-45%) 2/2 NICM, long-standing persistent AF, HTN, type II DM, morbid obesity, asthma, JYOTI and Graves disease s/p radioiodine ablation c/b hypothyroid admitted 1/23/2023 for heart failure exacerbation with hospital course complicated by CO2 retention and being obtunded, transferred to ICU on 1/28/2023 for encephalopathy suspected secondary to hypercapnia in setting of aggressive diuresis and contraction alkalosis despite using diamox. Initially transferred for intubation, but patient remained on BiPAP throughout ICU admission. Improved 1/29 after holding diuretics. Now on room air.          Interval History:     The last 24 hours progress and nursing notes reviewed.     Fasting BG: elevated  Daytime BG: slightly elevated  Appetite: good  Kidney function:  "stable CKD    Pt had snack from hospital (covered by the insulin) and she had additional snack cheese popcorn that was not covered by insulin      Relevant order  Aspart 1/8 g CHO  Lantus 70 unit(s) daily  Aspart HIRD     Active Diet Order  Orders Placed This Encounter      Combination Diet Moderate Consistent Carb (60 g CHO per Meal) Diet; 2 gm NA Diet           426 lbs 0 oz  No results for input(s): A1C in the last 168 hours.      Recent Labs   Lab 02/06/23  0519   CR 1.21*               Recent Labs   Lab 02/06/23  1204 02/06/23  0700 02/06/23  0519 02/06/23  0254 02/05/23  2301 02/05/23  2152   * 193* 170* 170* 269* 428*         Diabetes Type:  Type 2 Diabetes  IFEOMA-65 ziyad, c-peptide, islet cell ziyad - none found in Epic review      Diabetes Duration: since at least 2013          Nutrition:    Orders Placed This Encounter      Combination Diet Moderate Consistent Carb (60 g CHO per Meal) Diet; 2 gm NA Diet         PTA Regimen:      Tuojeo 40 unit(s) once daily at HS  Aspart 10 units with each meal + additional correction scale insulin as follows based on pre-meal glucose      -140 - 160: + 1 unit     -161 - 180: + 2 units     -181 - 200: + 3 units, and so on (sensitivity of 20 mg/dL)     Ozempic 1 mg weekly - she reports she did NOT resume as instructed     BG monitoring frequency:  3-4 times per day  BG trends at home: \"all over\" but mostly between 85 - 300      Historical:   BG monitor: fingerstick  Frequency of checks: 3 times daily  Tuojeo (glargine U300) 225 units once daily  Novolog 100  Units with meals, plus 1/15 correction scale  Trulicity 1mg weekly on Mondays  Jardiance 25 mg daily AM - stopped 2/2 UTI's   (has not tolerated Metformin in the past)      Ability to [place] Prescribed Regimen: has been independent - barriers to consistent compliance      Diabetes Complications:  retinopathy unk, last dilated eye exam unk  Peripheral neuropathy:  +, on gabapentin   Nephropathy:  +, recent " "microalbuminuria + worsening     History of DKA:   unk  Able to Detect Hypoglycemia: yes  Safety Kit:  no  Medic Alert:  No      Usual Diabetes Care Provider/CDCES: hx [facility] - no visits recently  - scheduled with Dr. Damon - no show on 1/31/2023 (in-patient since 1/23).  TINA Alexander, RN, CDCES - educator         Exam  BP 93/80 (BP Location: Left arm, Cuff Size: Adult Large)   Pulse (!) 141   Temp 97.3  F (36.3  C) (Oral)   Resp 18   Ht 1.702 m (5' 7\")   Wt (!) 190.6 kg (420 lb 3.2 oz)   SpO2 97%   BMI 65.81 kg/m    GENERAL: lying in bed with slight HOB up pillow no distress  SKIN: Visible skin clear.  EYES: Eyes grossly normal to inspection.  RESP: No audible wheeze, cough, No visible retractions or increased work of breathing.  NEURO.  Awake, alert, responds appropriately to questions.  Mentation and speech fluent.  PSYCH: affect normal, and appearance well-groomed.      DATA   Lab Results   Component Value Date    A1C 13.5 12/25/2022    A1C >15.0 07/23/2021    A1C >14.0 08/19/2020    A1C 8.4 10/11/2018    A1C 9.6 05/29/2018    A1C 9.0 04/07/2017    A1C 12.1 03/10/2016     Lab Results   Component Value Date    CREAT 0.9 09/29/2014       CBC RESULTS:   Recent Labs   Lab Test 02/08/23  0708   WBC 7.9   RBC 4.76   HGB 14.2   HCT 45.9   MCV 96   MCH 29.8   MCHC 30.9*   RDW 16.1*        Recent Labs   Lab Test 02/08/23  0735 02/08/23  0708 02/07/23  0745 02/07/23  0622   NA  --  131*  --  133*   POTASSIUM  --  4.9  --  4.6   CHLORIDE  --  92*  --  92*   CO2  --  29  --  32*   ANIONGAP  --  10  --  9   * 246*   < > 332*   BUN  --  55.0*  --  50.7*   CR  --  1.45*  --  1.28*   JUSTIN  --  9.0  --  9.1    < > = values in this interval not displayed.     Liver Function Studies -   Recent Labs   Lab Test 01/29/23  0319   PROTTOTAL 7.5   ALBUMIN 2.9*   BILITOTAL 1.4*   ALKPHOS 97   AST 37*   ALT 11     Lab Results   Component Value Date    INR 1.44 01/25/2023    INR 1.57 01/24/2023    INR " 1.36 12/25/2022    INR 0.99 08/19/2020    INR 1.94 10/29/2019    INR 3.49 07/23/2019    INR 2.29 06/26/2019    INR 3.02 03/18/2019    INR 2.42 03/13/2019    INR 1.21 02/19/2019    INR 3.70 01/07/2019    INR 3.24 11/30/2018    INR 2.13 10/30/2018    INR 2.99 10/24/2018    INR 2.18 10/11/2018       To contact Endocrine Diabetes service:   From 8AM-4PM: page inpatient diabetes provider that is following the patient that day (see filed or incomplete progress notes/consult notes).  If uncertain of provider assignment: page job code [phone].  For questions or updates from 4PM-8AM: page the diabetes job code for on call fellow: [phone]    Discussed with attending   Vladislav Disla MD  Endocrine Fellow  Pager # [phone]

## 2023-02-08 NOTE — PROGRESS NOTES
Cardiology Progress Note      Assessment & Plan:  Pricilla Brown is a 47 year old female with a history of HFmrEF (EF 40-45%) 2/2 NICM, long-standing persistent AF, HTN, type II DM, morbid obesity, asthma, JYOTI and Graves disease s/p radioiodine ablation c/b hypothyroid admitted 1/23/2023 for heart failure exacerbation with hospital course complicated by CO2 retention and being obtunded, transferred to ICU on 1/28/2023 for encephalopathy suspected secondary to hypercapnia in setting of aggressive diuresis and contraction alkalosis despite using diamox. Initially transferred for intubation, but patient remained on BiPAP throughout ICU admission. Improved 1/29 after holding diuretics. Currently net negative -64L and ~130lbs since admission, tolerating PO diuretics, pending ARU placement.    Today's Update:  - Restart Dilt due to persistent tachycardia  - Resume PO diuretics this afternoon as HR/BP allow  - Podiatry consult per endo rec    # Acute on Chronic Systolic Heart Failure (EF 40-45%)  # Long-Standing Persistent Atrial Fibrllation  # Hypertension   # Troponin Elevation 2/2 CHF  Pt admitted with c/o increasing SOB, MURRAY, chest tightness, weight gain in spite of compliance with PO diuretics. NT BNP 5891 (was 2030 during most recent admission).  Lactic acid 2.2 on admission with improvement to 1.1 with diuresis. Troponin mildly elevated, 67-->75-->60 likely 2/2 CHF exacerbation; no concern for ACS currently. Most recent Echo with EF 40-45%. Coronary angiogram 2012 with no significant CAD. Of note, pt recently admitted 12/25-1/1 HF exacerbation, diuresed to 472lbs. Pt weight on admission 1/23 555lbs.   - Volume Status: Euvelemic, able to assess volume status adequately, no need for RHC  - New  lbs  - Diuresis: Decrease Bumex to 1 mg PO BID d/t tachycardia  - Beta Blocker: Continue Toprol XL to 50mg  - ACEi/ARB/ARNI:No lisinopril d/t allergy.   - SGLT2i: Contraindicated with recurrent yeast infections   -  Aldosterone Antagonist: Continue PTA Spironolactone 50mg  - Rate Control: BB as above. Add diltiazem 120 BID due to persistent tachycardia and borderline reduced EF. Would recommend repeat TTE now that patient is euvolemic once rates are improved for better assessment of EF  - Afterload reduction: Cont hydralazine 10mg PO TID (hold parameters SBP <110)  - Anticoagulation: CHADSVASC 3; Continue PTA apixaban  - Strict I/O & Daily Weights  - Low Na Diet / 2L Fluid Restriction  - Daily BMPs  - [facility] consulted; has appointment 2/10, will discuss with them today the need to probably change this due to TCU placement issues. Recommend CardioMEMS discussion at this appointment (discussed with Yuliet [facility] NP).      # Chronic Kidney Disease Stage 3b  Longstanding history of diabetes and HTN. Baseline Creatinine appears to be around 1.2-1.4. Creatinine on admission 1.56.   - Daily BMPs (see above, for diuresis)  - Avoid nephrotoxic agents  - Labs today: Crt 1.45 (1.28), BUN 55 (50.7), GFR 44 (51)      # Graves Disease s/p Radioiodine Ablation  # Hypothyroidism 2/2 Radioiodine Ablation  TSH 20.4 and free T4 0.82 on admission.   - Endocrine consulted; recommendations below   - Continue levothyroxine 200 mcg M-Sat- 400 mcg on Sunday.    - Recheck TFT in a month (March 2023)     # Type 2 Diabetes Mellitus  # Morbid obesity  A1c 13.5% on 12/25/2022. Discrepancies in patient's home regimen. Endocrinology consulted during admission, signed off, and re-consulted 2/1/23 -- BGs range 180-350s  - Appreciate ongoing endo recommendations  - Insulin gtt started 2/1 per endocrinology, transitioned back to subcutaneous lantus and novolog on 2/3/23.   - Lantus increased per endocrinology to 70 units daily per Endocrine   - Continue Novolog mealtime insulin  1:4g CHO(while inpatient)  - Continue high intensity SSI   - POCT glucose checks QID   - Hypoglycemia protocol  - Resume PTA Ozempic at discharge  - Discharge recommendations per  endocrinology; Toujeo 40 at bedtime, aspart 10u w/ each meal + additional coverage based on pre-meal glucose (140-160) + 1 unit, (161-180) + 2 units, (181-200) + 3 units, and resume Ozempic 1mg weekly.     # Right Toe Wound  Present on admission, right great toe and 3rd toe, dry gangrene, pt unaware how long wounds have been present.   - WOCN following: wound cares to right toe daily. Paint toes with betadine. Be sure to apply over dry eschar. Ok to leave open to air. No soaking feet.  - Vascular consulted; JOSETTE's completed showing patent vessels and normal waveforms. They recommend orthopedic foot and ankle consult   - Ortho consulted; plan for outpatient follow up to discuss elective amputation on 2/17/23 at 10:25 with Dr. Oscar Leahy.  - Podiatry consult 2/8 per endo recs  - Continue with LE elevation     #Acute simple cystitis, resolved  Positive UA 2/2/23 and urine dicoloration.   -Macrobid 100mg BID x5 days through 2/7.      # Asthma  Patient endorsing mouth irritation w/ inhaler use. Roof of mouth erythematous and appears excoriated but w/o evidence of yeast.   - Continue PTA inhalers (levalbuterol and spiriva)  - Magic mouthwash ordered      # JYOTI  - Continue BiPAP at night    # Hyperlipidemia   - Continue lipitor 40 mg daily     # Pain  Patient reporting pain everywhere. Has been using narcotics along with shae, topical nsaids, and muscle relaxants. Aggressive diuresis vs diabetic neuropathy.  - Pain management curbside consulted 1/31; decrease gabapentin to BID, stop flexeril, add Robaxin 400 q6hr PRN  - Discussed the importance of transitioning off IV narcotics before discharge   - PRN Lidocaine patches     FEN: 2 gm Sodium / 2L Fluid restriction  Code status: Full  Prophylaxis: PO Eliquis  Isolation: None  Disposition: Pending TCU placement     Patient discussed w/ Dr. Reid, who agrees with above plan.    Lauren Madrid, APRN, CNP  [facility]  Cards 1  Ascom  "[id number]    Medical Decision Making       45 MINUTES SPENT BY ME on the date of service doing chart review, history, exam, documentation & further activities per the note.      Interval History:  Remains tachycardic overnight, will add diltiazem back on  Feeling well, has progressed to ARU status, patient is motivated and excited to get back to ARU and then home  Will decrease bumex today    Most recent vital signs:  BP (!) 89/73 (BP Location: Right arm, Cuff Size: Adult Large)   Pulse (!) 129   Temp 97.9  F (36.6  C) (Oral)   Resp 20   Ht 1.702 m (5' 7\")   Wt (!) 190.6 kg (420 lb 3.2 oz)   SpO2 100%   BMI 65.81 kg/m    Temp:  [97.6  F (36.4  C)-98.4  F (36.9  C)] 97.9  F (36.6  C)  Pulse:  [122-130] 129  Resp:  [20-22] 20  BP: ()/() 89/73  SpO2:  [96 %-100 %] 100 %  Wt Readings from Last 2 Encounters:   02/08/23 (!) 190.6 kg (420 lb 3.2 oz)   01/01/23 (!) 214.4 kg (472 lb 9.6 oz)       Intake/Output Summary (Last 24 hours) at 2/8/2023 0656  Last data filed at 2/8/2023 0600  Gross per 24 hour   Intake 1620 ml   Output 2300 ml   Net -680 ml       Physical exam:  General: Pleasant elderly female. Appears comfortable and in no acute distress. Alert and interactive  HEENT: Normocephalic, atraumatic. No scleral icterus or injection  Neck: JVP difficult assessment due to body habitus  CARDIAC: Regular rate and rhythm, no m/r/g appreciated. Peripheral pulses 2+  RESP: Normal work of breathing on room air without use of accessory breathing muscles. Clear to auscultation in all fields. No wheezes, rhonchi or crackles appreciated.  GI: No abdominal distention. Soft and nontender.   EXTREMITIES: 1/4 pedal pulses bilaterally. +2 pre-tibial edema. R great toe & 4th toe cyanotic and peeling  SKIN: No acute lesions appreciated. Warm and dry to touch  NEURO: Alert and oriented X3, CN II-XII grossly intact, no focal neurological deficits noted, normal speech  PSYCH: Mood and affect are appropriate    Labs (Past three " days):  CBC  Recent Labs   Lab 23  0622 23  0519 23  0508 23  0431   WBC 7.1 6.8 6.5 8.0   RBC 4.81 4.80 4.66 4.75   HGB 13.9 14.1 13.7 14.0   HCT 46.2 46.8 45.2 45.9   MCV 96 98 97 97   MCH 28.9 29.4 29.4 29.5   MCHC 30.1* 30.1* 30.3* 30.5*   RDW 16.1* 16.1* 16.3* 16.4*    289 254 249     BMP  Recent Labs   Lab 23  2135 23  1638 23  1154 23  0745 23  0622 23  0700 23  0519 23  1618 23  1610 23  0659 23  0508   NA  --   --   --   --  133*  --  131*  --  132*  --  132*   POTASSIUM  --   --   --   --  4.6  --  4.5  --  4.7  --  4.7   CHLORIDE  --   --   --   --  92*  --  91*  --  91*  --  92*   CO2  --   --   --   --  32*  --  29  --  31*  --  31*   ANIONGAP  --   --   --   --  9  --  11  --  10  --  9   * 278* 399* 296* 332*   < > 170*   < > 179*   < > 185*   BUN  --   --   --   --  50.7*  --  49.9*  --  50.0*  --  51.8*   CR  --   --   --   --  1.28*  --  1.21*  --  1.23*  --  1.28*   GFRESTIMATED  --   --   --   --  51*  --  55*  --  54*  --  51*   JUSTIN  --   --   --   --  9.1  --  9.1  --  9.2  --  8.6   MAG  --   --   --   --  1.8  --  2.1  --  2.1  --  2.1    < > = values in this interval not displayed.     Troponins:   Lab Results   Component Value Date    TROPONIN 0.01 2013        Diagnostics:  23 EC/24/23 Echo:  Technically difficult study.Extremely difficult acoustic windows despite the  use of contrast for endcardial border definition.  Left ventricular function is decreased. The ejection fraction is 40-45%  (mildly reduced).  Right ventricular function cannot be assessed due to poor image quality.  IVC diameter >2.1 cm collapsing <50% with sniff suggests a high RA pressure  estimated at 15 mmHg or greater.     This study was compared with the study from 2022. No significant changes  Noted.    23 Right  JOSETTE:                                                                  IMPRESSION: Negative study. Triphasic waveforms and adequate  velocities in the right posterior tibial, anterior tibial, and  dorsalis pedis arteries.     No results found for this or any previous visit (from the past 24 hour(s)).     No results found for this or any previous visit (from the past 24 hour(s)).

## 2023-02-09 NOTE — PROGRESS NOTES
[facility]   Cardiology   Progress Note     ASSESSMENT/PLAN:  Pricilla Brown is a 47 year old female with a history of HFmrEF (EF 40-45%) 2/2 NICM, long-standing persistent AF, HTN, type II DM, morbid obesity, asthma, JYOTI and Graves disease s/p radioiodine ablation c/b hypothyroid admitted 1/23/2023 for heart failure exacerbation with hospital course complicated by CO2 retention and being obtunded, transferred to ICU on 1/28/2023 for encephalopathy suspected secondary to hypercapnia in setting of aggressive diuresis and contraction alkalosis despite using diamox. Initially transferred for intubation, but patient remained on BiPAP throughout ICU admission. Improved 1/29 after holding diuretics. Currently net negative -64L and ~130lbs since admission, tolerating PO diuretics, pending ARU placement.    Today's Update:  - Decrease Bumex 1mg  frequency to daily  - Pending ARU placement    # Acute on Chronic Systolic Heart Failure (EF 40-45%)  # Long-Standing Persistent Atrial Fibrllation  # Hypertension   # Troponin Elevation 2/2 CHF  Pt admitted with c/o increasing SOB, MURRAY, chest tightness, weight gain in spite of compliance with PO diuretics. NT BNP 5891 (was 2030 during most recent admission).  Lactic acid 2.2 on admission with improvement to 1.1 with diuresis. Troponin mildly elevated, 67-->75-->60 likely 2/2 CHF exacerbation; no concern for ACS currently. Most recent Echo with EF 40-45%. Coronary angiogram 2012 with no significant CAD. Of note, pt recently admitted 12/25-1/1 HF exacerbation, diuresed to 472lbs. Pt weight on admission 1/23 555lbs.   - Volume Status: Euvelemic, able to assess volume status adequately, no need for RHC  - Diuresis: Decreased Bumex to 1 mg PO daily d/t rising creatinine 1.75 (1.45)   - Given rise in creat, we believe that her EDW is 427lbs  - Beta Blocker: Continue Toprol XL to 50mg  - ACEi/ARB/ARNI:No lisinopril d/t allergy.   - SGLT2i: Contraindicated  with recurrent yeast infections   - Aldosterone Antagonist: Continue PTA Spironolactone 50mg  - Rate Control: BB as above. Add diltiazem 120 BID due to persistent tachycardia and borderline reduced EF.   - Afterload reduction: Cont hydralazine 10mg PO TID (hold parameters SBP <110)  - Anticoagulation: CHADSVASC 3; Continue PTA apixaban  - Strict I/O & Daily Weights  - Low Na Diet / 2L Fluid Restriction  - Daily BMPs  - [facility] consulted; has appointment 2/10, will discuss with them today the need to probably change this due to TCU placement issues. Recommend CardioMEMS discussion at this appointment (Yuliet, [facility] clinic NP aware).     # Chronic Kidney Disease Stage 3b  Longstanding history of diabetes and HTN. Baseline Creatinine appears to be around 1.2-1.4. Creatinine on admission 1.56.   - Daily BMPs (see above, for diuresis)  - Avoid nephrotoxic agents  - Labs today: Crt 1.75 (1.45), BUN 63.8 (55), GFR 35 (44)    # Graves Disease s/p Radioiodine Ablation  # Hypothyroidism 2/2 Radioiodine Ablation  TSH 20.4 and free T4 0.82 on admission.   - Endocrine consulted; recommendations below   - Continue levothyroxine 200 mcg M-Sat- 400 mcg on Sunday.    - Recheck TFT in a month (March 2023)    # Type 2 Diabetes Mellitus  # Morbid obesity  A1c 13.5% on 12/25/2022. Discrepancies in patient's home regimen. Endocrinology consulted during admission, signed off, and re-consulted 2/1/23 -- BGs now range 180-280s  - Appreciate ongoing endo recommendations  - Insulin gtt started 2/1 per endocrinology, transitioned back to subcutaneous lantus and novolog on 2/3/23.   - Lantus increased per endocrinology to 70 units daily per Endocrine   - Continue Novolog mealtime insulin  1:4g CHO(while inpatient)  - Continue high intensity SSI   - POCT glucose checks QID   - Hypoglycemia protocol  - Resume PTA Ozempic at discharge  - Discharge recommendations per endocrinology; Toujeo 40 at bedtime, aspart 10u w/ each meal + additional coverage  based on pre-meal glucose (140-160) + 1 unit, (161-180) + 2 units, (181-200) + 3 units, and resume Ozempic 1mg weekly.    # Right Toe Wound  Present on admission, right great toe and 3rd toe, dry gangrene, pt unaware how long wounds have been present.   - WOCN following: wound cares to right toe daily. Paint toes with betadine. Be sure to apply over dry eschar. Ok to leave open to air. No soaking feet.  - Vascular consulted; JOSETTE's completed showing patent vessels and normal waveforms. They recommend orthopedic foot and ankle consult   - Ortho consulted; plan for outpatient follow up to discuss elective amputation on 2/17/23 at 10:25 with Dr. Oscar Leahy.  - Podiatry consult 2/8 per endo recs  - Continue with LE elevation    #Acute simple cystitis, resolved  Positive UA 2/2/23 and urine dicoloration.   -Macrobid 100mg BID x5 days through 2/7, course completed    # Asthma  Patient endorsing mouth irritation w/ inhaler use. Roof of mouth erythematous and appears excoriated but w/o evidence of yeast.   - Continue PTA inhalers (levalbuterol and spiriva)  - Magic mouthwash ordered      # JYOTI  - Continue BiPAP at night    # Hyperlipidemia   - Continue lipitor 40 mg daily     # Pain  Patient reports pain is improved. Has been using narcotics (percocet) along with shae, topical nsaids, and muscle relaxants. Aggressive diuresis vs diabetic neuropathy.  - Pain management curbside consulted 1/31; decrease gabapentin to BID, stop flexeril, add Robaxin 400 q6hr PRN  - PRN Lidocaine patches    FEN: 2 gm Sodium / 2L Fluid restriction  Code status: Full  Prophylaxis: PO Eliquis  Isolation: None  Disposition: Pending ARU placement     Patient seen and discussed with Dr. Reid, who agrees with above plan.    Sheree Betts, APRN, CNP  [facility] Cardiology Team  Pager [phone]    Interval History:  - Pt reports feeling good today  - Excited and anxious to work with PT/OT today, has been standing and stepping side to side, plans to  walk more  - Denies SOB or CP    Physical Exam:  Temp:  [97  F (36.1  C)-98.3  F (36.8  C)] 97.7  F (36.5  C)  Pulse:  [] 83  Resp:  [16-18] 18  BP: ()/() 109/93  SpO2:  [93 %-100 %] 99 %    I/O:    Intake/Output Summary (Last 24 hours) at 2/9/2023 0801  Last data filed at 2/8/2023 2300  Gross per 24 hour   Intake 1310 ml   Output 1050 ml   Net 260 ml         Wt:   Wt Readings from Last 5 Encounters:   02/09/23 (!) 193.7 kg (427 lb)   01/01/23 (!) 214.4 kg (472 lb 9.6 oz)   10/31/22 (!) 201 kg (443 lb 3.2 oz)   10/26/22 (!) 208.6 kg (459 lb 14.4 oz)   06/21/22 (!) 191 kg (421 lb)         General: NAD  HEENT:  PERRLA, EOMI.   Neck: JVD difficult to assess d/t body habitus.   CV: Irregularly irregular. No murmur appreciated. No rubs or gallops. Peripheral radial pulse intact.  Resp: No increased work of breathing or use of accessory muscles, breathing comfortably on room air.  Lung sounds clear throughout/bilaterally  Abdomen:  Normal active bowel sounds.  Abdomen is soft. No distension, non-tender to palpation.    Extremities: Warm. Capillary refill less than 3 sec. 2/4 radial pulses bilaterally.  1/4 pedal pulses bilaterally. +2 pre-tibial edema. R great toe & 4th toe cyanotic and peeling  Skin:  No acute lesions. Warm and dry to touch.  Neuro: Alert and oriented x3.      Medications:    apixaban ANTICOAGULANT  5 mg Oral BID     atorvastatin  40 mg Oral Daily     bumetanide  1 mg Oral BID     capsaicin   Topical TID     diltiazem ER  120 mg Oral BID     gabapentin  600 mg Oral BID     hydrALAZINE  10 mg Oral TID     insulin aspart  1-7 Units Subcutaneous At Bedtime     insulin aspart  1-10 Units Subcutaneous TID AC     insulin aspart   Subcutaneous TID w/meals     insulin glargine  70 Units Subcutaneous Daily     levothyroxine  200 mcg Oral Once per day on Mon Tue Wed Thu Fri Sat     levothyroxine  400 mcg Oral Weekly     lidocaine  2 patch Transdermal Q24H     lidocaine   Transdermal Q8H OPHELIA      metoprolol succinate ER  50 mg Oral Daily     miconazole   Topical BID     nicotine  1 patch Transdermal Daily    And     nicotine   Transdermal Q8H OPHELIA     polyethylene glycol  17 g Oral Daily     sennosides  8.6 mg Oral BID     sodium chloride (PF)  10-40 mL Intracatheter Q8H     spironolactone  50 mg Oral Daily     umeclidinium  1 puff Inhalation Daily       - MEDICATION INSTRUCTIONS -       ACE/ARB/ARNI NOT PRESCRIBED       sodium chloride         Labs:   CMP  Recent Labs   Lab 23  0723 23  0659 23  0326 23  2137 23  0735 23  0708 23  0745 23  0622 23  0700 23  0519   NA  --  131*  --   --   --  131*  --  133*  --  131*   POTASSIUM  --  5.2  --   --   --  4.9  --  4.6  --  4.5   CHLORIDE  --  91*  --   --   --  92*  --  92*  --  91*   CO2  --  29  --   --   --  29  --  32*  --  29   ANIONGAP  --  11  --   --   --  10  --  9  --  11   * 301* 282* 211*   < > 246*   < > 332*   < > 170*   BUN  --  63.8*  --   --   --  55.0*  --  50.7*  --  49.9*   CR  --  1.75*  --   --   --  1.45*  --  1.28*  --  1.21*   GFRESTIMATED  --  35*  --   --   --  44*  --  51*  --  55*   JUSTIN  --  9.2  --   --   --  9.0  --  9.1  --  9.1   MAG  --  2.0  --   --   --  1.9  --  1.8  --  2.1    < > = values in this interval not displayed.     CBC  Recent Labs   Lab 2359 23  0708 23  0622 23  0519   WBC 8.4 7.9 7.1 6.8   RBC 4.66 4.76 4.81 4.80   HGB 13.8 14.2 13.9 14.1   HCT 44.5 45.9 46.2 46.8   MCV 96 96 96 98   MCH 29.6 29.8 28.9 29.4   MCHC 31.0* 30.9* 30.1* 30.1*   RDW 16.4* 16.1* 16.1* 16.1*    314 309 289     INRNo lab results found in last 7 days.  Arterial Blood GasNo lab results found in last 7 days.   Lab Results   Component Value Date    TROPONIN 0.01 2013         Diagnostics:  23 EC/24/23 Echo:  Technically difficult study.Extremely difficult acoustic windows despite the  use of contrast for endcardial border  definition.  Left ventricular function is decreased. The ejection fraction is 40-45%  (mildly reduced).  Right ventricular function cannot be assessed due to poor image quality.  IVC diameter >2.1 cm collapsing <50% with sniff suggests a high RA pressure  estimated at 15 mmHg or greater.     This study was compared with the study from 6/14/2022. No significant changes  Noted.    1/27/23 Right JOSETTE:                                                                  IMPRESSION: Negative study. Triphasic waveforms and adequate  velocities in the right posterior tibial, anterior tibial, and  dorsalis pedis arteries.    No results found for this or any previous visit (from the past 24 hour(s)).    Medical Decision Making       35 MINUTES SPENT BY ME on the date of service doing chart review, history, exam, documentation & further activities per the note.

## 2023-02-09 NOTE — PLAN OF CARE
Shift: 1500- 1930    Neuro: A&Ox4. Calm, cooperative, pleasant. Hopeful that she will be able to walk with PT/OT tomorrow.   Cardiac: Telemetry- baseline Afib, HR improving, consistently 80-95. VSS  Respiratory: RA, tolerating well.  GI/: Has not voided this shift, encouraged up to commode ptnt responds they will try within the hour. Purewick in place with no output. BM 2/7  Diet/Appetite: Diet: Fluid restriction 2000 ML FLUID  Snacks/Supplements Adult: Other; 8pm: 1/2 Roast beef or turkey sandwich on wheat bread with lettuce, tomato and quinn & a fruit cup (peaches, pears, or grapes - NO canteloupe or banana please). Do not count toward sodium restriction.; Between Meals  Combination Diet Moderate Consistent Carb (60 g CHO per Meal) Diet; 2 gm NA Diet  Fluid restriction 2000 ML FLUID      appetite good.  Activity: Up with lift assist. Marched in place with OT this afternoon.   Pain: Baseline joint pain controlled with around-the-clock dosing of prn meds.   Skin: No new deficits noted. R big toe black, podiatry consulted.  Lines: x2 L PIV- SL  PRN: Robaxin Q6, Percocet Q8, Acetaminophen Q6    I/O this shift:  In: 480 [P.O.:480]  Out: 200 [Urine:200]    P: Promote elimination, plan for ARU pending placement.           Goal Outcome Evaluation:      Plan of Care Reviewed With: patient    Overall Patient Progress: improvingOverall Patient Progress: improving    Outcome Evaluation: Ptnt called for carb coverage of snack. Marching in place with OT

## 2023-02-09 NOTE — PROGRESS NOTES
CLINICAL NUTRITION SERVICES    Reviewed nutrition risk factors due to LOS. Pt is tolerating diet and eating adequately per nursing documentation (good appetite, consuming 100% consistently). Per pt, she may not be that hungry at a meal but then may be very hungry for the next meal. Likes to snack. Snacking on hard candy during visit. States she has been adjusting how she eats here. Room service has been helping her to select options that are allowed on her diet (moderate consistent carbohydrate, 2 g sodium, 2 L fluid restriction). Finds that she is hungry between lunch and supper. Reviewed wt hx: 222.3 kg (9/28/20), 193.2 kg (11/23/21), 193.2 kg (4/5/2022), 208.6 kg (10/26/2022), 214.4 kg (1/1/2023), 193.7 kg (2/9/2023) - Diuresis, fluid loss this admission. Difficult to assess actual changes in wt.       Per RD on 2/7, snacking on lots of hard candy; for RN, 1 candy = 5 gm carbs; not able to catch pt for visit.    INTERVENTIONS:  Implementation:  Modify composition of meals/snacks: Discussed snack options. Ordered a snack at 14:00, cheese and crackers. Explained room service allocations. Gave sodium and carb room service menus and wrote restrictions on her handouts.   Nutrition education for nutrition relationship to health/disease: Low-sodium nutrition education and fluid restriction discussed. Explained carbohydrates in foods and balancing macronutrients. Gave examples of good snack options that are lower in sodium and carbohydrates. Gave handouts: American Diabetes Association handout What Can I Eat? Smart Snacks, Tips for a Low-Sodium Diet, Carbohydrate Counting, Managing Your Fluid Intake, and carb room service and sodium room service menus.     Follow up/Monitoring:  Will continue to follow pt via rounds.    Vera Pelaez, MS, RD, LD, [facility]   6C Pgr: [phone]

## 2023-02-09 NOTE — PROGRESS NOTES
Care Management Follow Up    Length of Stay (days): 17    Expected Discharge Date: 02/09/2023     Concerns to be Addressed: discharge planning     Patient plan of care discussed at interdisciplinary rounds: Yes    Anticipated Discharge Disposition: TCU     Anticipated Discharge Services:  TCU therapy services   Anticipated Discharge DME:  n/a    Patient/family educated on Medicare website which has current facility and service quality ratings:  yes  Education Provided on the Discharge Plan:  yes  Patient/Family in Agreement with the Plan:  yes    Referrals Placed by CM/SW:    DENIALS  2/3--- [facility] (no bed until Tuesday, SW to re-send referral then)  2/3--- [facility] (not able to meet bariatric needs)  2/3--- [facility] (not able to meet bariatric needs)  2/6--- [facility] (not able to meet bariatric needs)  2/6--- [facility] (no available TCU beds anytime soon)  2/6--- [facility] (no bariatric beds available)  2/6--- [facility] (bariatric limit is 350 lbs so not able to meet the needs of the pt)  2/7--- [facility] (not able to meet pt's bariatric needs)  2/8--- [facility] (no beds, bariatric needs, cost of medications)  2/8--- [facility] (no beds, bariatric needs)  2/8--- [facility] (bariatric needs)           PENDING  [facility]  [address] [place] [address] [place] [address]  Phone: [phone]  Admissions: [phone]   Fax: [phone]  - 2/9: SW called admissions at 9:33am and when at the , there was no option to leave one so MARGO to call back at a later time    [facility]  Address: [address] [place] [address]  - 5th floor of the building   - Phone: [phone]  - RN to RN #: [phone]  - 2/9: Per Roslyn in rehab admissions, there aren't any ARC beds open today or tomorrow (not known about this weekend either). Roslyn told the SW they'll continue to review and  she'll keep an eye out for what PT/OT recommend on this pt after they work with her today. Roslyn told the SW that she will be at 6C morning discharge rounds as well. Per Roslyn in morning discharge rounds, they are following for ARC but no beds today or Friday and she's watching if there'd be pending discharges this weekend.      [facility]  [address]  [address]  Admissions: [phone]  Fax: [phone]     [facility]  [address] [place] [address]  Phone: [phone]  Admissions: [phone]  Fax: [phone]  - 2/9: SW called at 10:19am and per admissions, they still haven't received the fax so SW resent the fax on [facility] [address], [place] [address]  Phone: [phone]  Fax: [phone]  - 2/9: SW called at 10:13am and left a VM with admissions     [facility]  [address] [place] [address]  Admissions: [phone]  Fax: [phone]  - 2/9: SW called at 10:12am and left a VM with admissions    Private pay costs discussed: Not applicable    Additional Information:  SW is following for discharge planning.     Pt is med ready and is awaiting placement. Pt is an ARU rec. and is being followed by  Rehab for ARU placement. Per Roslyn in rehab admissions, pt wouldn't be able to go to any OP appts until rehab stay is complete (predicted 3 week stay) so SW paged the Ortho PA (NELDA Mckeon) at 2:52pm with the request to push the appointment to 3/17. Per Cards 1, the appointment isn't urgent and is to look at the pt's foot.     ___________________    DANIEL Villagomez, LGSW  6C   [facility]  Phone: [phone]  Pager: [phone]

## 2023-02-09 NOTE — PROVIDER NOTIFICATION
Page to Sonia ROMERO, Silva Hernandes:    6C. Pricilla Brown. 06. Pt voided 500ml total today. last void at 3pm. unable to void since. bladder scan volume is 550. Pt last straight cath yesterday night. Do you want me to straight cath or insert phan? thanks. lamar [phone]

## 2023-02-09 NOTE — PLAN OF CARE
Neuro: A&Ox4. Calm and cooperative   Cardiac: Afebrile, low BP this afternoon, hydralazine held. Afib 80s-90s   Respiratory: RA, MURRAY, refuses cpap at night  GI/: voided on commode once today, purewick in place. Last BM 2/8, bowel meds given  Diet/appetite: Tolerating mod consistent carb 2gm Na diet. 2L FR. ACHS blood glucose w/ sliding scale and carb counts. Lantus increased today. Denies nausea.  Activity: Up with lift assist, working with PT/OT    Pain: reporting generalized joint pain, PRN robaxin, tylenol and percocet effective  Skin: peeling and scabbing on gasper feet, see WOC note for RLE foot gangrene; numbness to RLE, tingling in LLE.   Lines: L PIVx2  Drains: n/a  Replacements: Mag and K protocols, Mag 2.0 replaced.     Awaiting TCU/ARU placement. Continue to monitor pt status and report changes to Cards 1.     Problem: Diabetes Comorbidity  Goal: Blood Glucose Level Within Targeted Range  Outcome: Progressing     Problem: Pain Acute  Goal: Optimal Pain Control and Function  Outcome: Progressing    Goal: Enhanced Capacity and Energy  Outcome: Progressing

## 2023-02-09 NOTE — INTERIM SUMMARY
[facility] Pre-Admission Screen    Referral Source:  [facility] [address] [place] 6406-01  Admit date to referring facility: 1/23/2023    Physical Medicine and Rehab Consult Completed: No    Rehab Diagnosis:    09 Cardiac -  heart failure exacerbation    Justification for Acute Inpatient Rehabilitation  Pricilla Brown is a 47 year old female with a history of standing persistent AF, HTN, type II DM, morbid obesity, asthma, JYOTI and Graves disease s/p radioiodine ablation c/b hypothyroid admitted 1/23/2023 for heart failure exacerbation with hospital course complicated by CO2 retention and being obtunded, transferred to ICU on 1/28/2023 for encephalopathy suspected secondary to hypercapnia in setting of aggressive diuresis and contraction alkalosis despite using diamox. Initially transferred for intubation, but patient remained on BiPAP throughout ICU admission. Improved 1/29 after holding diuretics. Currently net negative -64L and ~130lbs since admission, tolerating PO diuretics.  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 No plan for orthopaedic surgery intervention during this admission. Pt is medically ready to admit to acute inpatient rehab.     Patient requires an intensive inpatient rehab program to address the following acute impairments:impaired activity tolerance, impaired balance, impaired ROM, impaired sensation, impaired strength and pain. These impairments are impacting the patients ability to safely and independently complete bed mobility, functional transfers, ambulation, gait, stairs, ADLs and IADLs.     Current Active Medical Management Needs/Risks for Clinical Complications  The patient requires the high level of rehabilitation physician supervision that accompanies the provision of intensive rehabilitation therapy.  The patient needs the services of the  rehabilitation physician to assess the patient medically and functionally and to modify the course of treatment as needed to maximize the patient's capacity to benefit from the rehabilitation process. The patient requires physician oversight at least 3x/week to manage the following:   Cardiac - Manage changes in cardiac status in the setting of acute on chronic systolic heart failure. Weight on admission 555lbs; new estimated dry weight is 430lbs. Pt responded to diuresis. Bumex held on 2/11 due to NIRAJ. Hold/discontinue PTA Spironolactone 50mg d/t hyperkalemia. Monitor fluid status with strict I/Os and daily wts. Continue Beta Blocker for rate control. Added diltiazem 120 BID due to persistent tachycardia and borderline reduced EF. Continue hydralazine 10mg PO TID (hold parameters SBP <110) and apixiban.  Daily BMPs. Continue to monitor BMP.   Renal: In the setting of acute on CKD and hyperkalemia. Potassium continually rising since 2/9 with peak 2/11 @ 7.3. Potassium shifted with insulin, calcium gluconate for cardiac stability, lokelma 15 g X 1, IV Bumex total of 6 mg on 2/11/23. Holding spironolactone and bumex. Avoid nephrotoxic agents. Will need ongoing assessment.   Endocrine: In the setting of DM II and morbid obesity. A1c 13.5% on 12/25/2022. Discrepancies in patient's home regimen. Endocrinology consulted during admission, signed off, and re-consulted 2/1/23. Insulin gtt started 2/1 per endocrinology, transitioned back to subcutaneous lantus and novolog on 2/3/23. Lantus increased per endocrinology to 70 units daily. Lantus increased per endocrinology to 70 units daily. Continue high intensity SSI. Ok to resume PTA Ozempic at discharge. Discharge recommendations per endocrinology; Toujeo 40 at bedtime, aspart 10u w/ each meal + additional coverage based on pre-meal glucose (140-160) + 1 unit, (161-180) + 2 units, (181-200) + 3 units, and resume Ozempic 1mg weekly.   Graves disease: Hypothyroidism 2/2  Radioiodine Ablation. Endocrine consulted this admission. Continue levothyroxine 200 mcg M-Sat- 400 mcg on Sunday.  Recheck TFT in a month (March 2023)  Musculoskeletal: In the setting of right great toe and 3rd toe, dry gangrene. WOCN following: wound cares to right toe daily. Paint toes with betadine. Be sure to apply over dry eschar. Ok to leave open to air. No soaking feet. Ortho consulted; plan for outpatient follow up to discuss elective amputation on 2/17/23 at 10:25 with Dr. Oscar Leahy; this will need to be rescheduled. Will need ongoing assessment.   Pulmonary: In setting of asthma and JYOTI. Bipap at night. Continue PTA inhalers (levalbuterol and spiriva). Magic mouthwash ordered. Promote sleep hygiene.   BMI 67.35. Increased body habitus places the patient at increased risk for complications and mortality.   Pain: In setting of Aggressive diuresis vs diabetic neuropathy. Pain management curbside consulted 1/31; decrease gabapentin to BID, stop flexeril, add Robaxin 400 q6hr PRN. PRN Lidocaine patches. Patient will need ongoing assessment and adjustment of pain medications for optimal participation in therapies.   Prophylaxis: PO Eliquis    Pt will continue to need ongoing medical management as she is at increased risk for falls with or without injury secondary to muscle weakness, decreased sensation, pain, deconditioning and overall impaired balance.     Past Medical/Surgical History  Surgery in the past 100 days: No  Additional relevant past medical history:  persistent AF, HTN, type II DM, morbid obesity, asthma, JYOTI and Graves disease s/p radioiodine ablation c/b hypothyroid     Level of Functioning Prior to Admission:    LIVING ENVIRONMENT  People in Home: alone (PCA services 10 hr during day, 4 hr at night)  Current Living Arrangements: apartment  Home Accessibility: stairs to enter home  Number of Stairs, Main Entrance: 4  Stair Railings, Main Entrance: railings safe and in good condition, railings  on both sides of stairs  Transportation Anticipated: agency  Living Environment Comments: Pt lives in apartment with 4STE, tub shower with bench. Reports PCA available ~14/hrs daily.    SELF-CARE  Usual Activity Tolerance: good  Regular Exercise: No  Equipment Currently Used at Home: cane, straight, walker, rolling  Activity/Exercise/Self-Care Comment: Pt requires assistance for some of ADLs - usually able to dress self and perform G/H. Assistance with bathing.    Additional Comments:  Pt is very motivated to return to [facility] and complete more tasks for herself in her home environment. Support also from her brother at discharge - he often stops by her home.    Level of Function: GG Scale (Section GG Functional Ability and Goals; CMS's ROGER Version 3.0 Manual effective 10.1.2019):  PT Current Function Goals for Rehab   Bed Rolling 4 Supervision or touching assitance 6 Independent   Supine to Sit 2 Substantial/maximal assistance 6 Independent   Sit to Stand 4 Supervision or touching assitance 6 Independent   Transfer 1 Dependent(lift bed to chair) 6 Independent   Ambulation 4 Supervision or touching assitance 6 Independent   Stairs 88 Not attempted due to safety 4 Supervision or touching assitance     OT Current Function Goals for Rehab   Feeding 5 Setup or clean-up assistance 6 Independent   Grooming 2 Substantial/maximal assistance 4 Supervision or touching assitance   Bathing 3 Partial/moderate assistance 4 Supervision or touching assitance   Upper Body Dressing 3 Partial/moderate assistance 6 Independent   Lower Body Dressing Not completed 6 Independent   Toileting 1 Dependent(Ax2) 6 Independent   Toilet Transfer Not completed 6 Independent   Tub/Shower Transfer Not completed 3 Partial/moderate assistance   Cognition Not Impaired Not applicable     SLP Current Function Goals for Rehab   Swallow Not Impaired Not applicable   Communication Not Impaired Not applicable       Current Diet:  0-Thin, 7-Regular and Low  sodium    Summary Statement:  Patient currently has physical therapy and occupational therapy needs. Pre gait activitites completed in standing including lateral weight shifts, then alternatving heel raises, then marching in place. Pt able to ambulate with SBA 3' +3'+6', no evidence of knee buckling. Seated rest break taken between each bout of gait. Sit to stand from chair with SBA. Total assist with lift to get from bed to chair. SBA with rolling.      OT facilitating and pt completing UB wiping arms & lotion application with setup, max A with LB. ModA bathing (PCA assist with this at baseline), Lisa UB dressing.  Assist x2 with toileting.    Expected Therapies and Services Required During Inpatient Rehab Admission  Intensity of Therapy: Patient requires intensive therapies not available in a lesser level of care. Patient is motivated, making gains, and can tolerate 3 hours of therapy a day.  Physical Therapy: 90 minutes per day, 7 days a week for 10 days  Occupational Therapy: 90 minutes per day, 7 days a week for 10 days  Speech and Language Therapy: Not indicated at this time.  Rehabilitation Nursing Needs: Patient requires 24 hour Rehab Nursing to manage vitals, medication education, carryover of new rehab techniques, care coordination, skin integrity, blood sugar management, diabetes education, pain management, provide safe environment for patient at falls risk and monitor nutritional intake. Pt will need daily wound care to R toes.    Precautions/restrictions/special needs:   Precautions: fall precautions   Restrictions: Low sodium diet, mod carb diet, 2L fluid restriction   Special Needs: bariatric equipment, lift and Specialty Mattress    Expected Level of Improvement: Anticipate with intensive therapies, close medical management, and rehabilitative nursing care the patient will improve strength, balance, tolerance to activity, safety to ensure Mod I with basic mobility and ADL performance to allow return  home with family assistance.   Expected Length of time to achieve: 10 days    Anticipated Discharge Needs:  Anticipated Discharge Destination: Home  Anticipated Discharge Support: PCA  24/7 support available : No  Identified caregiver(s):  PCA   Anticipated Discharge Needs: Home with homecare and Home with outpatient therapy    Identified challenges/barriers:  n/a    Liaison signature/date/time:    Physician statement of review and agreement:  I have reviewed and am in agreement of the need for IRF stay to address above functional and medical needs. In addition to above statements address, Patient requires intensive active and ongoing therapeutic intervention and multiple therapies; Patient requires medical supervision; Expected to actively participate in the intensive rehab program; Sufficiently stable to actively participate; Expectation for measurable improvement in functional capacity or adaption to impairments.    MD signature/date/time:

## 2023-02-09 NOTE — PLAN OF CARE
"BP 99/79 (BP Location: Left arm, Cuff Size: Adult Regular)   Pulse 83   Temp 97.8  F (36.6  C) (Oral)   Resp 18   Ht 1.702 m (5' 7\")   Wt (!) 193.7 kg (427 lb)   SpO2 95%   BMI 66.88 kg/m      Shift: 9470-1113     Status: Adm 1/23/2023 for HF exacerbation with hospital course complicated by respiratory distress. Trx to ICU on 1/28/2023 for encephalopathy suspected secondary to hypercapnia in setting of aggressive diuresis and contraction alkalosis despite using diamox. Initially transferred for intubation, but patient remained on BiPAP throughout ICU admission. Improved 1/29 after holding diuretics. Currently net negative -64L and ~130lbs since admission, tolerating PO diuretics, pending ARU placement.    PMHx HFmrEF (EF 40-45%) 2/2 NICM, long-standing persistent AF, HTN, type II DM, morbid obesity, asthma, JYOTI and Graves disease s/p radioiodine ablation c/b hypothyroid    Neuro: A&O x4. Able to make needs known   Respiratory: RA, sating mid 90s. MURRAY.   Cardiac: Afib, HR 80s-90s. Denies cardiac chest pain   GI/: Pt due to void around 2300, but unsuccessful. Straight cath done per Cards MD. Zuniga currently in place. LBM yesterday   Diet: Combination carb, 2g NA, 2L FR. BS ACHS + carb coverage (meals and snacks)  Pain: Generalized joint pain managed w/ PRN robaxin and percocet   Skin: Right foot w/ dry, cracked skin peeling off and gangrene to toes, L foot w/ dry, cracked skin   IV Access: L PIV x2 SL   Labs: , 282  Activity: Ax2 w/ lift     New changes this shift: No acute changes overnight. Reminded pt to call RN if she has a snack, so carbs can be covered. Cares clustered to promote rest/sleep   Plan: Pending ARU placement. Continue w/ POC and notify Cards 1 of any changes or concerns.   "

## 2023-02-09 NOTE — PROGRESS NOTES
[facility]  Diabetes Consult Daily Progress Note           Assessment and Plan:   Assessment:  Principal Problem:    Acute HFrEF (heart failure with reduced ejection fraction) (H)  Graves s/p ROGER now w/ postablative hypothyroidism  Diabetes Mellitus    Plan:  - Diabetes Mellitus   - If clinically stable, ok to restart home Ozempic 1mg daily (not ordered)   - Increase Lantus 80u daily   - Increase Aspart 1:3u with meals   - Increase Aspart very high slide 1:10            - Hypoglycemia protocol            - diabetes education needs will be assessed closer to discharge            - Outpatient follow up: Thelma Archibald/Dr Silva Damon [facility]    Hx Graves s/p ROGER ablation  Hypothyroidism  Hx non-adherence to LT4  TSH 20 on admission, resumed her PTA dose 200mcg 6 days week, 400mcg once weekly  Recheck TFTs in 6-8 weeks outpatient  Discussed importance of being adherent to LT4, effect on cardiac issues         Interval History:     Continues to improve.  Vital signs generally better.  Eating and voiding well.  Tolerating medications without significant side effects.  No new concerns today.  Hyperglycemic overnight without eating/drinking            Significant Problems:     Past Medical History:   Diagnosis Date     A-fib (H) 2011    on coumadin since 1/13     Asthma     as a kid     Chest pain 2/1/2017     Chronic anticoagulation for a-fib 2/15/2013    INR's followed by coumadin clinic at      Diabetes mellitus (H) 2012     Diastolic heart failure 2/15/2013     Dilated cardiomyopathy (H) 1/8/2013     HTN (hypertension)      Hyperthyroidism     Graves, s/p I131 1/13, now on prednisone and methimazole     Mixed type age-related cataract, both eyes 4/8/2022     Morbid obesity (H)      Pulmonary embolism (H) 1/12    hospitalized in [place], on lovenox/coumadin for a few months but stopped, hypercoag w/u neg per pt     Sleep apnea     using CPAP              Review of Systems:     The Review of Systems is  negative other than noted in the HPI            Medications:     Current Facility-Administered Medications   Medication     acetaminophen (TYLENOL) tablet 650 mg     albuterol (PROVENTIL HFA/VENTOLIN HFA) inhaler     albuterol (PROVENTIL) neb solution 2.5 mg     apixaban ANTICOAGULANT (ELIQUIS) tablet 5 mg     atorvastatin (LIPITOR) tablet 40 mg     benzocaine-menthol (CHLORASEPTIC MAX) 15-10 MG lozenge 1 lozenge     bisacodyl (DULCOLAX) suppository 10 mg     [START ON 2/10/2023] bumetanide (BUMEX) tablet 1 mg     capsaicin (ZOSTRIX) 0.025 % cream     clotrimazole (LOTRIMIN) 1 % cream     glucose gel 15-30 g    Or     dextrose 50 % injection 25-50 mL    Or     glucagon injection 1 mg     diclofenac (VOLTAREN) 1 % topical gel 2 g     diltiazem ER (CARDIZEM SR) 12 hr capsule 120 mg     gabapentin (NEURONTIN) capsule 600 mg     heparin lock flush 10 UNIT/ML injection 5-20 mL     hydrALAZINE (APRESOLINE) tablet 10 mg     hydrOXYzine (ATARAX) tablet 25 mg    Or     hydrOXYzine (ATARAX) tablet 50 mg     insulin aspart (NovoLOG) injection (RAPID ACTING)     insulin aspart (NovoLOG) injection (RAPID ACTING)     insulin aspart (NovoLOG) injection (RAPID ACTING)     insulin aspart (NovoLOG) injection (RAPID ACTING)     [START ON 2/10/2023] insulin glargine (LANTUS PEN) injection 80 Units     labetalol (NORMODYNE/TRANDATE) injection 10 mg     levothyroxine (SYNTHROID/LEVOTHROID) tablet 200 mcg     levothyroxine (SYNTHROID/LEVOTHROID) tablet 400 mcg     Lidocaine (LIDOCARE) 4 % Patch 2 patch     lidocaine patch in PLACE     magic mouthwash suspension (diphenhydramine, lidocaine, aluminum-magnesium & simethicone)     methocarbamol (ROBAXIN) tablet 500 mg     metoprolol succinate ER (TOPROL XL) 24 hr tablet 50 mg     miconazole (MICATIN) 2 % cream     naloxone (NARCAN) injection 0.2 mg    Or     naloxone (NARCAN) injection 0.4 mg    Or     naloxone (NARCAN) injection 0.2 mg    Or     naloxone (NARCAN) injection 0.4 mg      nicotine (NICODERM CQ) 14 MG/24HR 24 hr patch 1 patch    And     nicotine Patch in Place     ondansetron (ZOFRAN ODT) ODT tab 4 mg     oxyCODONE-acetaminophen (PERCOCET) 5-325 MG per tablet 1 tablet     Patient is already receiving anticoagulation with heparin, enoxaparin (LOVENOX), warfarin (COUMADIN)  or other anticoagulant medication     polyethylene glycol (MIRALAX) Packet 17 g     Reason ACE/ARB/ARNI order not selected     sennosides (SENOKOT) tablet 8.6 mg     simethicone (MYLICON) chewable tablet 80 mg     sodium chloride (PF) 0.9% PF flush 10-20 mL     sodium chloride (PF) 0.9% PF flush 10-20 mL     sodium chloride (PF) 0.9% PF flush 10-40 mL     spironolactone (ALDACTONE) tablet 50 mg     umeclidinium (INCRUSE ELLIPTA) 62.5 MCG/ACT inhaler 1 puff     Facility-Administered Medications Ordered in Other Encounters   Medication     sodium chloride (PF) 0.9% PF flush 10 mL            Physical Exam:   Vitals were reviewed  Exam:  Constitutional: healthy, alert, no acute distress, obese sitting up in chair  Head: Normocephalic. No masses, lesions, no exophthalmos/proptosis  ENT: visible goiter  Respiratory: nonlabored  Gastrointestinal: Abdomen soft, non-tender.  Musculoskeletal: extremities normal- no gross deformities noted, gait normal and normal muscle tone  Skin: no suspicious lesions or rashes  Neurologic: Gait normal. sensation grossly intact  Psychiatric: mentation appears normal, calm              Data:   All laboratory data reviewed   Glucose Values Latest Ref Rng & Units 6/24/2022 6/24/2022 6/24/2022 6/25/2022 6/25/2022 6/25/2022 6/25/2022   Bedside Glucose (mg/dl )  - 154 149 174 156 234 149 161   GLUCOSE 70 - 99 mg/dL -- -- -- -- -- -- --   Some recent data might be hidden             Bernie Nation MD     DISPLAY PLAN FREE TEXT DISPLAY PLAN FREE TEXT DISPLAY PLAN FREE TEXT

## 2023-02-10 PROBLEM — N17.9 ACUTE KIDNEY FAILURE, UNSPECIFIED (H): Status: ACTIVE | Noted: 2023-01-01

## 2023-02-10 NOTE — PROGRESS NOTES
ENDOCRINE BRIEF NOTE    *Pt is not being seen physically  Reviewed vitals and labs.    BG slightly above the goal    1)  Type II diabetes c/b insulin resistance, diabetic neuropathy/nephropathy; sub optimal control (A1c 13.5%)  2)  Morbid obesity; BMI 74        Plan:       -   Continue Lantus 80 unit(s) at 0800    -   Continue novolog 1:3 g cho TID AC/HS meals/snacks    -   BG  TID AC/HS/02    -   Novolog very high resistance sliding scale insulin    -   PTA med: not taking Ozempic but should resume outpatient .  - If clinically stable, ok to restart home Ozempic 1mg daily (not ordered)    -   Hypoglycemia protocol    -   Recommend carb counting protocol    -   Education :  TBD - not anticipated     -   Outpatient follow up:  recommend [facility] Endocr[facility]ology - needs to re-schedule with Dr. Silva Damon       Discussed with attending  Vladislav Disla MD  Endocrine Fellow  Pager # [phone]

## 2023-02-10 NOTE — PROGRESS NOTES
[facility]   Cardiology   Progress Note     ASSESSMENT/PLAN:  Pricilla Brown is a 47 year old female with a history of HFmrEF (EF 40-45%) 2/2 NICM, long-standing persistent AF, HTN, type II DM, morbid obesity, asthma, JYOTI and Graves disease s/p radioiodine ablation c/b hypothyroid admitted 1/23/2023 for heart failure exacerbation with hospital course complicated by CO2 retention and being obtunded, transferred to ICU on 1/28/2023 for encephalopathy suspected secondary to hypercapnia in setting of aggressive diuresis and contraction alkalosis despite using diamox. Initially transferred for intubation, but patient remained on BiPAP throughout ICU admission. Improved 1/29 after holding diuretics. Currently net negative -64L and ~130lbs since admission, tolerating PO diuretics, pending ARU placement.    Today's Update:  - Creat increase 1.96 (1.75) - Bumex given this morning, hold Bumex & Aldactone tomorrow  - Continue PT/OT work  - Pending ARU placement    # Acute on Chronic Systolic Heart Failure (EF 40-45%)  # Long-Standing Persistent Atrial Fibrllation  # Hypertension   # Troponin Elevation 2/2 CHF, resolved    Pt admitted with c/o increasing SOB, MURRAY, chest tightness, weight gain in spite of compliance with PO diuretics. NT BNP 5891 (was 2030 during most recent admission).  Lactic acid 2.2 on admission with improvement to 1.1 with diuresis. Troponin mildly elevated, 67-->75-->60 likely 2/2 CHF exacerbation; no concern for ACS currently. Most recent Echo with EF 40-45%. Coronary angiogram 2012 with no significant CAD. Of note, pt recently admitted 12/25-1/1 HF exacerbation, diuresed to 472lbs. Pt weight on admission 1/23 555lbs.   - Volume Status: Euvelemic, able to assess volume status adequately, no need for RHC  - Diuresis: Rising creatinine 1.96 (1.75) - Bumex 1mg given this morning, hold Bumex tomorrow              - Given rise in creat, we believe that her EDW is 427lbs  -  Beta Blocker: Continue Toprol XL to 50mg  - ACEi/ARB/ARNI:No lisinopril d/t allergy.   - SGLT2i: Contraindicated with recurrent yeast infections   - Aldosterone Antagonist: Hold PTA Spironolactone 50mg d/t NIRAJ - K 5.5 (5.2)  - Rate Control: BB as above. Cont diltiazem 120 BID due to persistent tachycardia and borderline reduced EF.   - Afterload reduction: Cont hydralazine 10mg PO TID (hold parameters SBP <110)  - Anticoagulation: CHADSVASC 3; Continue PTA apixaban  - Strict I/O & Daily Weights  - Low Na Diet / 2L Fluid Restriction  - Daily BMPs  - CORE consulted; has appointment 2/10, will discuss with them today the need to probably change this due to TCU placement issues. Recommend CardioMEMS discussion at this appointment (Yuliet, [facility] clinic NP aware).    #Kimberly on chronic Kidney Disease Stage 3b  Longstanding history of diabetes and HTN. Baseline Creatinine appears to be around 1.2-1.4. Creatinine on admission 1.56.   - Daily BMPs (see above, for diuresis)  - Labs today: Crt 1.96 (1.75), BUN 69.2 (63.8), GFR 31 (35), K 5.5 (5.2)  - Hold spironolactone tomorrow 2/11  - Avoid nephrotoxic agents      # Graves Disease s/p Radioiodine Ablation  # Hypothyroidism 2/2 Radioiodine Ablation  TSH 20.4 and free T4 0.82 on admission.   - Endocrine consulted; recommendations below   - Continue levothyroxine 200 mcg M-Sat- 400 mcg on Sunday.    - Recheck TFT in a month (March 2023)    # Type 2 Diabetes Mellitus  # Morbid obesity  A1c 13.5% on 12/25/2022. Discrepancies in patient's home regimen. Endocrinology consulted during admission, signed off, and re-consulted 2/1/23 -- BGs now range 180-280s  - Appreciate ongoing endo recommendations  - Insulin gtt started 2/1 per endocrinology, transitioned back to subcutaneous lantus and novolog on 2/3/23.   - Lantus increased per endocrinology to 70 units daily per Endocrine   - Continue Novolog mealtime insulin  1:4g CHO(while inpatient)  - Continue high intensity SSI   - POCT  glucose checks QID   - Hypoglycemia protocol  - Ok to resume PTA Ozempic at discharge (not covered on formulary inpatient)  - Discharge recommendations per endocrinology; Toujeo 40 at bedtime, aspart 10u w/ each meal + additional coverage based on pre-meal glucose (140-160) + 1 unit, (161-180) + 2 units, (181-200) + 3 units, and resume Ozempic 1mg weekly.    # Right Toe Wound  Present on admission, right great toe and 3rd toe, dry gangrene, pt unaware how long wounds have been present.   - WOCN following: wound cares to right toe daily. Paint toes with betadine. Be sure to apply over dry eschar. Ok to leave open to air. No soaking feet.  - Vascular consulted; JOSETTE's completed showing patent vessels and normal waveforms. They recommend orthopedic foot and ankle consult   - Ortho consulted; plan for outpatient follow up to discuss elective amputation on 2/17/23 at 10:25 with Dr. Oscar Leahy.  - Podiatry consult 2/8 per endo recs  - Continue with LE elevation, able to ambulate w/ PT/OT    #Acute simple cystitis, resolved  Positive UA 2/2/23 and urine dicoloration.   -Macrobid 100mg BID x5 days through 2/7, course completed    # Asthma  - Continue PTA inhalers (levalbuterol and spiriva)  - Magic mouthwash ordered      # JYOTI  - Continue BiPAP at night    # Hyperlipidemia   - Continue lipitor 40 mg daily     # Pain  Patient reports pain is improved. Has been using narcotics (percocet) along with shae, topical nsaids, and muscle relaxants. Aggressive diuresis vs diabetic neuropathy.  - Pain management curbside consulted 1/31; decrease gabapentin to BID, stop flexeril, add Robaxin 400 q6hr PRN  - PRN Lidocaine patches    FEN: 2 gm Sodium / 2L Fluid restriction  Code status: Full  Prophylaxis: PO Eliquis  Isolation: None  Disposition: Pending ARU placement     Patient seen and discussed with Dr. Reid, who agrees with above plan.    Sheree Betts, APRN, CNP  [facility] Cardiology Team  Pager [phone]/ASCOM [phone]    Interval  History:  - Pt reports that she didn't sleep well overnight  - Eager to be discharged to ARU   - Denies CP or SOB    Physical Exam:  Temp:  [97.1  F (36.2  C)-98.1  F (36.7  C)] 97.7  F (36.5  C)  Pulse:  [77-92] 84  Resp:  [18-20] 20  BP: ()/(57-97) 105/94  SpO2:  [93 %-100 %] 99 %    I/O:    Intake/Output Summary (Last 24 hours) at 2/10/2023 1136  Last data filed at 2/10/2023 0854  Gross per 24 hour   Intake 1640 ml   Output 750 ml   Net 890 ml       Wt:   Wt Readings from Last 5 Encounters:   02/10/23 (!) 195 kg (430 lb)   01/01/23 (!) 214.4 kg (472 lb 9.6 oz)   10/31/22 (!) 201 kg (443 lb 3.2 oz)   10/26/22 (!) 208.6 kg (459 lb 14.4 oz)   06/21/22 (!) 191 kg (421 lb)         General: NAD  HEENT:  PERRLA, EOMI.   Neck: JVD difficult to assess d/t body habitus .   CV: Irregularly irregular. No murmur appreciated. No rubs or gallops. Peripheral radial pulse intact.  Resp: No increased work of breathing or use of accessory muscles, breathing comfortably on room air.  Lung sounds clear throughout/bilaterally  Abdomen:  Normal active bowel sounds.  Abdomen is soft. No distension, non-tender to palpation.    Extremities: Warm. Capillary refill less than 3 sec. 2/4 radial pulses bilaterally.  1/4 pedal pulses bilaterally.+2 pre-tibial edema. R great toe & 4th toe cyanotic and peeling  Skin:  No acute lesions. Warm and dry to touch.  Neuro: Alert and oriented x3.      Medications:    apixaban ANTICOAGULANT  5 mg Oral BID     atorvastatin  40 mg Oral Daily     [Held by provider] bumetanide  1 mg Oral Daily     diltiazem ER  120 mg Oral BID     gabapentin  600 mg Oral BID     hydrALAZINE  10 mg Oral TID     insulin aspart  1-22 Units Subcutaneous TID AC     insulin aspart  1-16 Units Subcutaneous At Bedtime     insulin aspart   Subcutaneous TID w/meals     insulin glargine  80 Units Subcutaneous Daily     levothyroxine  200 mcg Oral Once per day on Mon Tue Wed Thu Fri Sat     levothyroxine  400 mcg Oral Weekly      lidocaine  2 patch Transdermal Q24H     lidocaine   Transdermal Q8H [facility]     magnesium oxide  400 mg Oral Q4H     metoprolol succinate ER  50 mg Oral Daily     miconazole   Topical BID     nicotine  1 patch Transdermal Daily    And     nicotine   Transdermal Q8H [facility]     polyethylene glycol  17 g Oral Daily     sennosides  8.6 mg Oral BID     sodium chloride (PF)  10-40 mL Intracatheter Q8H     spironolactone  50 mg Oral Daily     umeclidinium  1 puff Inhalation Daily       - MEDICATION INSTRUCTIONS -       ACE/ARB/ARNI NOT PRESCRIBED         Labs:   CMP  Recent Labs   Lab 02/10/23  0746 02/10/23  0649 02/10/23  0335 23  2240 23  0723 23  0659 23  0735 23  0708 23  0745 23  06   NA  --  131*  --   --   --  131*  --  131*  --  133*   POTASSIUM  --  5.5*  --   --   --  5.2  --  4.9  --  4.6   CHLORIDE  --  92*  --   --   --  91*  --  92*  --  92*   CO2  --  28  --   --   --  29  --  29  --  32*   ANIONGAP  --  11  --   --   --  11  --  10  --  9   * 215* 199* 113*   < > 301*   < > 246*   < > 332*   BUN  --  69.2*  --   --   --  63.8*  --  55.0*  --  50.7*   CR  --  1.96*  --   --   --  1.75*  --  1.45*  --  1.28*   GFRESTIMATED  --  31*  --   --   --  35*  --  44*  --  51*   JUSTIN  --  9.2  --   --   --  9.2  --  9.0  --  9.1   MAG  --  2.0  --   --   --  2.0  --  1.9  --  1.8    < > = values in this interval not displayed.     CBC  Recent Labs   Lab 23  0659 23  0708 23  0622 23  0519   WBC 8.4 7.9 7.1 6.8   RBC 4.66 4.76 4.81 4.80   HGB 13.8 14.2 13.9 14.1   HCT 44.5 45.9 46.2 46.8   MCV 96 96 96 98   MCH 29.6 29.8 28.9 29.4   MCHC 31.0* 30.9* 30.1* 30.1*   RDW 16.4* 16.1* 16.1* 16.1*    314 309 289     INRNo lab results found in last 7 days.  Arterial Blood GasNo lab results found in last 7 days.    Diagnostics:  23 EC/24/23 Echo:  Technically difficult study.Extremely difficult acoustic windows despite the  use of contrast  for endcardial border definition.  Left ventricular function is decreased. The ejection fraction is 40-45%  (mildly reduced).  Right ventricular function cannot be assessed due to poor image quality.  IVC diameter >2.1 cm collapsing <50% with sniff suggests a high RA pressure  estimated at 15 mmHg or greater.     This study was compared with the study from 6/14/2022. No significant changes  Noted.    1/27/23 Right JOSETTE:                                                                  IMPRESSION: Negative study. Triphasic waveforms and adequate  velocities in the right posterior tibial, anterior tibial, and  dorsalis pedis arteries.     No results found for this or any previous visit (from the past 24 hour(s)).    Medical Decision Making       36 MINUTES SPENT BY ME on the date of service doing chart review, history, exam, documentation & further activities per the note.

## 2023-02-10 NOTE — PLAN OF CARE
Goal Outcome Evaluation:    Major Shift Events:  Mag replaced per protocol. Encouraged OOB. Snack coverage prn. Wound cares done. Mild NIRAJ- encouraging fluids   Plan: ARU  For vital signs and complete assessments, please see documentation flowsheets

## 2023-02-10 NOTE — PROGRESS NOTES
Care Management Follow Up    Length of Stay (days): 18    Expected Discharge Date: 02/09/2023     Concerns to be Addressed: discharge planning     Patient plan of care discussed at interdisciplinary rounds: Yes    Anticipated Discharge Disposition: ARU     Anticipated Discharge Services:  ARU therapy services  Anticipated Discharge DME:  n/a    Patient/family educated on Medicare website which has current facility and service quality ratings:  yes  Education Provided on the Discharge Plan:  yes  Patient/Family in Agreement with the Plan:  yes    Referrals Placed by CM/SW:  Pt is being followed by  Rehab for ARU.    [facility]  Address: [address] [place] [id number]  - 5th floor of the building   - Phone: [phone]  - RN to RN #: [phone]  - 2/10: Per Roslyn in rehab admissions, there aren't any open beds today 2/10 but will keep SW updated on ARU discharges/ARU bed openings this weekend.     Private pay costs discussed: Not applicable    Additional Information:  SW is following for discharge planning.    Pt is med ready for discharge planning and [facility] MARGO met with the pt at bedside and gave her the update. Pt expressed happiness and told the SW how ready she is to get over there and start therapies. Pt asked the SW to update her CADI CM.    MARGO called the pt's CADI CM (Flores, Phone: [phone]) at 9:51am and left a VM requesting a call back. MARGO received a VM from Flores at 10:55am telling the SW she's available. MARGO called her back at 11:04am and gave Flores the update on discharge planning. Flores agreed with the plan and asked the SW that when the pt discharges to ARU, that she be notified.     ___________________    DANIEL Villagomez, LGSW  6C   [facility]  Phone: [phone]  Pager: [phone]

## 2023-02-10 NOTE — PLAN OF CARE
"BP (!) 121/97 (BP Location: Left arm, Cuff Size: Adult Regular)   Pulse 85   Temp 97.7  F (36.5  C) (Oral)   Resp 20   Ht 1.702 m (5' 7\")   Wt (!) 195 kg (430 lb)   SpO2 98%   BMI 67.35 kg/m         Shift: 5500-4167      Status: Adm 1/23 for HF exacerbation with hospital course complicated by respiratory distress. Trx to ICU on 1/28/2023 for encephalopathy suspected secondary to hypercapnia in setting of aggressive diuresis and contraction alkalosis despite using diamox. Initially transferred for intubation, but patient remained on BiPAP throughout ICU admission. Improved 1/29 after holding diuretics. Currently net negative -64L and ~130lbs since admission, tolerating PO diuretics, pending ARU placement.     PMHx HFmrEF (EF 40-45%) 2/2 NICM, long-standing persistent AF, HTN, type II DM, morbid obesity, asthma, JYOTI and Graves disease s/p radioiodine ablation c/b hypothyroid     Neuro: A&O x4. Able to make needs known   Respiratory: RA, sating mid 90s. MURRAY.   Cardiac: Afib, HR 80s-90s. Denies cardiac chest pain   GI/: Able to void when up to commode/sitting upright. LBM yesterday   Diet: Consistent carb, 2g NA, 2L FR. BS ACHS + carb coverage (meals and snacks)  Pain: Generalized joint pain managed w/ PRN robaxin, percocet, and tylenol   Skin: Right foot w/ dry, cracked skin peeling off and gangrene to toes, L foot w/ dry, cracked skin   IV Access: L PIV x2 SL   Labs: , 199  Activity: Ax 2/3 w/ lift      New changes this shift: No acute changes overnight.  Cares clustered to promote rest/sleep   Plan: Pending ARU placement. Continue w/ POC and notify Cards 1 of any changes or concerns.     "

## 2023-02-11 NOTE — PROGRESS NOTES
[facility]  Diabetes Consult Daily Progress Note           Assessment and Plan:   Assessment:  Principal Problem:    Acute HFrEF (heart failure with reduced ejection fraction) (H)  Graves s/p ROGER now w/ postablative hypothyroidism  Diabetes Mellitus type 2    Plan:  - Diabetes Mellitus   - Continue to hold Ozempic 1mg daily due to increasing NIRAJ   -  Decrease Lantus 75u daily   -  Decrease Aspart 1:4u with meals   -  Aspart very high slide 1:10            - Hypoglycemia protocol            - diabetes education needs will be assessed closer to discharge            - Outpatient follow up: Thelma Archibald/Dr Silva Damon [facility]    _awaiting for ARU bed           Interval History:          BG are lower but creat is increasing to 2.16.  Decrease lantus dose and decrease carb coverage    Having some nausea and constipation.  Not eating as she usually does.         Significant Problems:     Past Medical History:   Diagnosis Date     A-fib (H) 2011    on coumadin since 1/13     Asthma     as a kid     Chest pain 2/1/2017     Chronic anticoagulation for a-fib 2/15/2013    INR's followed by coumadin clinic at      Diabetes mellitus (H) 2012     Diastolic heart failure 2/15/2013     Dilated cardiomyopathy (H) 1/8/2013     HTN (hypertension)      Hyperthyroidism     Graves, s/p I131 1/13, now on prednisone and methimazole     Mixed type age-related cataract, both eyes 4/8/2022     Morbid obesity (H)      Pulmonary embolism (H) 1/12    hospitalized in [place], on lovenox/coumadin for a few months but stopped, hypercoag w/u neg per pt     Sleep apnea     using CPAP              Review of Systems:     The Review of Systems is negative other than noted in the HPI            Medications:     Current Facility-Administered Medications   Medication     acetaminophen (TYLENOL) tablet 650 mg     albuterol (PROVENTIL HFA/VENTOLIN HFA) inhaler     albuterol (PROVENTIL) neb solution 2.5 mg     apixaban ANTICOAGULANT (ELIQUIS)  tablet 5 mg     atorvastatin (LIPITOR) tablet 40 mg     benzocaine-menthol (CHLORASEPTIC MAX) 15-10 MG lozenge 1 lozenge     bisacodyl (DULCOLAX) suppository 10 mg     [Held by provider] bumetanide (BUMEX) tablet 1 mg     capsaicin (ZOSTRIX) 0.025 % cream     clotrimazole (LOTRIMIN) 1 % cream     glucose gel 15-30 g    Or     dextrose 50 % injection 25-50 mL    Or     glucagon injection 1 mg     diclofenac (VOLTAREN) 1 % topical gel 2 g     diltiazem ER (CARDIZEM SR) 12 hr capsule 120 mg     gabapentin (NEURONTIN) capsule 600 mg     heparin lock flush 10 UNIT/ML injection 5-20 mL     hydrALAZINE (APRESOLINE) tablet 10 mg     hydrOXYzine (ATARAX) tablet 25 mg    Or     hydrOXYzine (ATARAX) tablet 50 mg     insulin aspart (NovoLOG) injection (RAPID ACTING)     insulin aspart (NovoLOG) injection (RAPID ACTING)     insulin aspart (NovoLOG) injection (RAPID ACTING)     insulin aspart (NovoLOG) injection (RAPID ACTING)     insulin glargine (LANTUS PEN) injection 80 Units     labetalol (NORMODYNE/TRANDATE) injection 10 mg     levothyroxine (SYNTHROID/LEVOTHROID) tablet 200 mcg     levothyroxine (SYNTHROID/LEVOTHROID) tablet 400 mcg     magic mouthwash suspension (diphenhydramine, lidocaine, aluminum-magnesium & simethicone)     methocarbamol (ROBAXIN) tablet 500 mg     metoprolol succinate ER (TOPROL XL) 24 hr tablet 50 mg     miconazole (MICATIN) 2 % cream     naloxone (NARCAN) injection 0.2 mg    Or     naloxone (NARCAN) injection 0.4 mg    Or     naloxone (NARCAN) injection 0.2 mg    Or     naloxone (NARCAN) injection 0.4 mg     nicotine (NICODERM CQ) 14 MG/24HR 24 hr patch 1 patch    And     nicotine Patch in Place     ondansetron (ZOFRAN ODT) ODT tab 4 mg     oxyCODONE-acetaminophen (PERCOCET) 5-325 MG per tablet 1 tablet     Patient is already receiving anticoagulation with heparin, enoxaparin (LOVENOX), warfarin (COUMADIN)  or other anticoagulant medication     polyethylene glycol (MIRALAX) Packet 17 g     Reason  "ACE/ARB/ARNI order not selected     sennosides (SENOKOT) tablet 8.6 mg     simethicone (MYLICON) chewable tablet 80 mg     sodium chloride (PF) 0.9% PF flush 10-20 mL     sodium chloride (PF) 0.9% PF flush 10-40 mL     [Held by provider] spironolactone (ALDACTONE) tablet 50 mg     umeclidinium (INCRUSE ELLIPTA) 62.5 MCG/ACT inhaler 1 puff     Facility-Administered Medications Ordered in Other Encounters   Medication     sodium chloride (PF) 0.9% PF flush 10 mL            Physical Exam:   Blood pressure 103/70, pulse 71, temperature 97.6  F (36.4  C), temperature source Oral, resp. rate 22, height 1.702 m (5' 7\"), weight (!) 195 kg (430 lb), SpO2 94 %, not currently breastfeeding.      Exam:  Constitutional:  alert, no acute distress, laying in bed  ENT: visible goiter  Respiratory: nonlabored  Gastrointestinal: Abdomen soft, non-tender.  Musculoskeletal: extremities normal- no gross deformities noted, gait normal and normal muscle tone  Skin: no suspicious lesions or rashes  Neurologic: Gait normal. sensation grossly intact  Psychiatric: mentation appears normal, calm              Data:   All laboratory data reviewed     ROUTINE IP LABS (Last four results)  BMPRecent Labs   Lab 02/11/23  0810 02/11/23  0641 02/11/23  0441 02/10/23  2356 02/10/23  0746 02/10/23  0649 02/09/23  0723 02/09/23  0659 02/08/23  0735 02/08/23  0708   NA  --  132*  --   --   --  131*  --  131*  --  131*   POTASSIUM  --  6.1*  --   --   --  5.5*  --  5.2  --  4.9   CHLORIDE  --  94*  --   --   --  92*  --  91*  --  92*   JUSTIN  --  8.9  --   --   --  9.2  --  9.2  --  9.0   CO2  --  27  --   --   --  28  --  29  --  29   BUN  --  81.6*  --   --   --  69.2*  --  63.8*  --  55.0*   CR  --  2.16*  --   --   --  1.96*  --  1.75*  --  1.45*   * 103* 109* 91   < > 215*   < > 301*   < > 246*    < > = values in this interval not displayed.     CBC  Recent Labs   Lab 02/09/23  0659 02/08/23  0708 02/07/23  0622 02/06/23  0519   WBC 8.4 7.9 7.1 " 6.8   RBC 4.66 4.76 4.81 4.80   HGB 13.8 14.2 13.9 14.1   HCT 44.5 45.9 46.2 46.8   MCV 96 96 96 98   MCH 29.6 29.8 28.9 29.4   MCHC 31.0* 30.9* 30.1* 30.1*   RDW 16.4* 16.1* 16.1* 16.1*    314 309 289     INRNo lab results found in last 7 days.      Jamilah Prater PA-C  Inpatient Diabetes Service  Pager   [phone]    Contacting the Inpatient Diabetes Team   From 8AM-4PM: page inpatient diabetes provider that is following the patient, or utilize the job code paging system.   From 4PM-8AM: page the job code for endocrine fellow on call.        Please use the following job code to reach the Inpatient Diabetes team. Note that you must use an in house phone and that job codes cannot receive text pages.     Dial [phone] (or star-star-star [phone] on the new [employer] telephones), then [phone] to reach the endocrine-diabetes provider on call.      Review of prior external note(s) from - [facility] or Care Everywhere   35 minutes spent on the date of the encounter doing chart review, history and exam, documentation and further activities per the note     Over 50% of my time on the unit was spent counseling the patient and/or coordinating care regarding acute hyperglycemia management.  See note for details.

## 2023-02-11 NOTE — PLAN OF CARE
I/A: A/Ox4. VSS on RA. Afib/flutter. PM hydralazine held. Hand, foot and generalized pain managed with heat, tylenol, robaxin, atarax, and percocet. Interdry placed under breasts, mepi to R hip and sacrum. Mod consistent CHO/ 2g Na diet with good appetite. BG ACHS, carb coverage including snacks. Bladder scanned and straight cath'd x1 after being unable to void for 9 hrs. LBM 2/8. A2, GB+walker and/or lift.    P: Awaiting TCU bed. Wound cares. Encourage activity.     Hours of care: [id number]

## 2023-02-11 NOTE — PLAN OF CARE
Neuro: A&Ox4. Clustered care to help with fatigue/lack of sleep   Cardiac: A fib. VSS. Sys 110s HR 80-90s  Respiratory: Sating 90s on RA.  GI/: Adequate urine output. Phan placed 2/11 NOC. Phan removed around 1330 when pt stated severe pain with phan. BM 2/11  Diet/appetite: Tolerating 2g Na, Carb ct diet. Eating well.  Activity:  Assist of 1-2 (lift), up to chair  Pain: At acceptable level on current regimen.   Skin: No new deficits noted. R toe wound, L and R toe peeling, Hip dry scab   LDA's: L PIV    Plan: Continue with POC. Notify primary team with changes.       Critical K+ this AM of 6.1 (Cards 1 notified) EKG and rechecked completed. K+ recheck was 6.8. Provider notified and EKG repeat

## 2023-02-11 NOTE — CONSULTS
Nephrology Initial Consult  February 11, 2023    Pricilla Brown MRN:[id number] YOB: 1975  Date of Admission:1/23/2023  Primary care provider: Mario Lockhart  Requesting physician: Bacilio Diop MD    ASSESSMENT AND RECOMMENDATIONS:  NIRAJ, non-oliguric on CKD  Hyperkalemia, in the setting of above and spironolactone use  NIRAJ appears primarily hemodynamic precipitated by relative hypotension and tachycardia in the setting of continued diuresis with Cr rise since 2/7. No nephrotoxins evident. UOP remains adequate. UA 2/11 with persistent pyuria, prior from 1/23 was bland. Renal US from 12/2022 showed normal sized kidneys, no obstruction, however of note patient has chronic R lower cortical scarring (cause of which is not readily apparent).  - Agree with shifting with insulin and IV calcium, would repeat if K remains >6  - Agree with holding spironolactone  - Recommend IV bumex 2 mg to start with. Pending response can repeat higher dose (3-4 mg) in combination with IV thiazide diuretic to promote kaliuresis via distal tubular Na/K exchange if hyperkalemia persists. Of note, TTE today 2/11 with persistently dilated IVC suggestive of continued intravascular congestion.  - If above precipitates significant volume depletion would give 500 ml bolus at a time to replace some volume before repeating IV diuretic doses.  - Recommend lokelma 15g x1 now. Can repeat 15g dose later tonight and continue 10g TID tomorrow if needed  - Agree with serial K checks  - If concern for retention, would replace Keenan  - Strict I/Os  - Daily renal panel  - Low K diet    Recommendations were communicated to primary team verbally    Discussed with Dr. Rhonda Jung MD   Division of Renal Disease and Hypertension  [facility]  [employer]  Vocera Web Console    REASON FOR CONSULT: NIRAJ, hyperkalemia    HISTORY OF PRESENT ILLNESS:  Admitting provider and nursing notes reviewed  Pricilla Brown is a 47 year old with PMH HFmrEF  (EF 40-45%) 2/2 NICM, long-standing persistent AF, HTN, type II DM, morbid obesity, asthma, JYOTI and Graves disease s/p radioiodine ablation c/b hypothyroid admitted 1/23/2023 for heart failure exacerbation.   Patient developed Cr rise since 2/7, at that time had low BP and tachycardia in the setting of Afib with RVR after which metoprolol dose was increased and diltiazem was restarted. Patient notes feeling fatigued but otherwise denies SOB, chest pain, leg swelling, abdominal pain, nausea. Notes appetite has been poor and only ate twice yesterday. Continues to have good UOP. Notes some irritation in  area after Keenan removal.    PAST MEDICAL HISTORY:  Reviewed with patient on 02/11/2023  Past Medical History:   Diagnosis Date     A-fib (H) 2011    on coumadin since 1/13     Asthma     as a kid     Chest pain 2/1/2017     Chronic anticoagulation for a-fib 2/15/2013    INR's followed by coumadin clinic at      Diabetes mellitus (H) 2012     Diastolic heart failure 2/15/2013     Dilated cardiomyopathy (H) 1/8/2013     HTN (hypertension)      Hyperthyroidism     Graves, s/p I131 1/13, now on prednisone and methimazole     Mixed type age-related cataract, both eyes 4/8/2022     Morbid obesity (H)      Pulmonary embolism (H) 1/12    hospitalized in [place], on lovenox/coumadin for a few months but stopped, hypercoag w/u neg per pt     Sleep apnea     using CPAP       Past Surgical History:   Procedure Laterality Date     PICC INSERTION - TRIPLE LUMEN Right 01/24/2023    right cephalic 5 fr tl picc 48 cm        MEDICATIONS:  PTA Meds  Prior to Admission medications    Medication Sig Last Dose Taking? Auth Provider Long Term End Date   acetaminophen (TYLENOL) 325 MG tablet Take 2 tablets (650 mg) by mouth 3 times daily as needed for mild pain or fever (total acetaminophen dose should not exceed 3000 mg per day) Past Week Yes Jamilah Bernal MD     albuterol (PROVENTIL) (2.5 MG/3ML) 0.083% neb solution Take 1 vial (2.5 mg)  by nebulization every 6 hours as needed for shortness of breath / dyspnea or wheezing Past Week Yes Yuliet Renee APRN CNP Yes    albuterol (VENTOLIN HFA) 108 (90 Base) MCG/ACT inhaler Inhale 2 puffs into the lungs every 6 hours as needed for shortness of breath / dyspnea 1/23/2023 Yes Lenore Roberts,  Yes    apixaban ANTICOAGULANT (ELIQUIS) 5 MG tablet Take 1 tablet (5 mg) by mouth 2 times daily 1/23/2023 Yes Percy Alcala MD     atorvastatin (LIPITOR) 40 MG tablet Take 1 tablet (40 mg) by mouth daily 1/23/2023 at 0800 Yes Yuliet Renee APRN CNP Yes    bumetanide (BUMEX) 1 MG tablet Take 5 tablets (5 mg) by mouth 2 times daily 1/22/2023 Yes Madeline Vasquez MD Yes    capsaicin (ZOSTRIX) 0.025 % external cream Apply 1 g topically 3 times daily as needed (use for neuropathy pain) 1/23/2023 Yes Isela Archibald PA-C     ciclopirox (LOPROX) 0.77 % cream Apply topically 2 times daily To feet and toenails. 1/22/2023 Yes Madeline Vasquez MD     clotrimazole (LOTRIMIN) 1 % external cream Apply topically 2 times daily as needed (skin irritation) Past Week Yes Jamilah Bernal MD     colchicine (COLCYRS) 0.6 MG tablet Take 1-2 tablets by mouth daily as needed More than a month Yes Unknown, Entered By History     diclofenac (VOLTAREN) 1 % topical gel Apply 4 grams to knees or 2 grams to hands four times daily using enclosed dosing card. Past Month Yes Griselda Jean Baptiste APRN CNP     docusate sodium (COLACE) 100 MG tablet Take 100 mg by mouth daily as needed for constipation More than a month Yes Unknown, Entered By History     Efinaconazole 10 % SOLN Externally apply topically daily To toenails. Past Month Yes Norman Jean DPM     gabapentin (NEURONTIN) 300 MG capsule Take 2 capsules (600 mg) by mouth 3 times daily 1/23/2023 at 0800 Yes Karen Velasquez PA-C Yes    hydrALAZINE (APRESOLINE) 25 MG tablet Take 0.5 tablets (12.5 mg) by mouth 3 times daily 1/23/2023 Yes Madeline Vasquez MD Yes     hydrochlorothiazide (HYDRODIURIL) 12.5 MG tablet Take 1 tablet (12.5 mg) by mouth 2 times daily 1/23/2023 Yes Percy Alcala MD Yes    insulin aspart (NOVOLOG FLEXPEN) 100 UNIT/ML pen INJECT 10 UNITS UNDER THE SKIN WITH MEALS, PLUS CORRECTION. Additional correction scale insulin as follows based on pre-meal glucose: 140 - 160 = + 1 unit;  161 - 180 = + 2 units, 181 - 200 = + 3 units, 201-220 = +4 units, 221-240 = +5 units, 241-260 = +6 units, 261-280 = +7 units, 281-300 = +8 units 1/23/2023 Yes Madeline Vasquez MD Yes    insulin glargine U-300 (TOUJEO MAX SOLOSTAR) 300 UNIT/ML (2 units dial) pen Inject subcu Toujeo 40 units at bedtime 1/22/2023 Yes Madeline Vasquez MD Yes    insulin pen needle (BD RIVER U/F) 32G X 4 MM miscellaneous Use 6 daily or as directed 1/23/2023 Yes Isela Archibald PA-C No    isosorbide dinitrate (ISORDIL) 20 MG tablet Take 1 tablet (20 mg) by mouth 3 times daily 1/23/2023 Yes Yuliet Renee, APRN CNP Yes    levothyroxine (SYNTHROID/LEVOTHROID) 200 MCG tablet Take 1 tablet (200mcg) daily Monday-Saturday and take 2 tablets (400mcg) on Sunday 1/23/2023 Yes Unknown, Entered By History No    metoprolol succinate ER (TOPROL XL) 25 MG 24 hr tablet Take 3 tablets (75 mg) by mouth daily 1/23/2023 Yes Madeline Vasquez MD Yes    nicotine (NICOTROL) 10 MG/ML SOLN inhalation solution Spray 1 spray in nostril every hour as needed for smoking cessation Past Week Yes Unknown, Entered By History     nystatin (MYCOSTATIN) 283654 UNIT/GM external cream Apply topically 2 times daily To toenails 1/22/2023 Yes Norman Jean DPM     oxyCODONE (ROXICODONE) 5 MG tablet Take 5 mg by mouth 5 times daily 1/23/2023 Yes Unknown, Entered By History     polyethylene glycol (MIRALAX/GLYCOLAX) powder Take 17 g by mouth daily as needed for constipation More than a month Yes Reported, Patient     prednisoLONE acetate (PRED FORTE) 1 % ophthalmic suspension As directed 1 Drop 3 times daily. Follow  physician instructions for the operative eye. Past Week Yes Sun Degroot MD     Semaglutide, 1 MG/DOSE, 4 MG/3ML SOPN Inject 1 mg Subcutaneous once a week 1/23/2023 Yes Isela Archibald PA-C     spironolactone (ALDACTONE) 50 MG tablet Take 1 tablet (50 mg) by mouth daily Past Week Yes Madeline Vasquez MD Yes    tiotropium (SPIRIVA HANDIHALER) 18 MCG inhalation capsule Inhale contents of one capsule daily. 1/23/2023 Yes Roc Plasencia MD Yes    topiramate (TOPAMAX) 25 MG tablet 25 mg at bedtime for 1 week, 50 mg at bedtime for 1 week and 75 mg daily at bedtime thereafter Past Week Yes Silva Damon MD Yes    ACCU-CHEK SANGEETA PLUS test strip USE TO TEST BLOOD SUGAR FOUR TIMES A DAY  OR AS DIRECTED.   Isela Archibald PA-C     blood glucose (NO BRAND SPECIFIED) lancets standard USE TO CHECK  blood sugar fasting each am  prelunch and predinner daily OR AS DIRECTED Call clinic to schedule MD APPT.   Isela Archibald PA-C     blood glucose (NO BRAND SPECIFIED) test strip Use to test blood sugar 4 times daily or as directed. Accu check Sangeeta plus   Lulu Lr MD     blood glucose (NO BRAND SPECIFIED) test strip Use to test blood sugar 4 times daily or as directed.   Vladislav Wright MD     blood glucose (NO BRAND SPECIFIED) test strip Use to test blood sugars 3 times daily or as directed   Isela Archibald PA-C     blood glucose monitoring (ACCU-CHEK FASTCLIX) lancets Use to test blood sugar 4 times daily or as directed.or lancets that go with meter being used   Isela Archibald PA-C     blood glucose monitoring (IBG STAR) meter device kit -PLEASE GIVE PATIENT A DEVICE HER INSURANCE WILL COVER-   Isela Archibald PA-C     blood glucose monitoring (NO BRAND SPECIFIED) meter device kit Use to test blood sugar 4  times daily or as directed.   Isela Archibald PA-C     blood glucose monitoring (NO BRAND SPECIFIED) meter device kit Use to test blood sugar  3 times daily or as directed.   Isela Archibald PA-C     Blood Pressure KIT Use to check blood pressure as directed, once daily and as needed. Record results.   Karen Velasquez PA-C     Continuous Blood Gluc Sensor (FREESTYLE BELLO 14 DAY SENSOR) MISC Change every 14 days.   Madeline Vasquez MD Yes    Continuous Blood Gluc Sensor (FREESTYLE BELLO 14 DAY SENSOR) MISC 1 each every 14 days   Madeline Vasquez MD Yes    Continuous Blood Gluc Sensor (FREESTYLE BELLO 2 SENSOR) MISC 1 Units every 14 days Check BG 4 times daily before meals and at bedtime.   Madeline Vasquez MD Yes    glucagon 1 MG SOLR injection Inject 1 mg Subcutaneous every 15 minutes as needed for low blood sugar   Madeline Vasquez MD Yes    order for DME Left foot   Norman Jean DPM     order for DME Equipment being ordered: [id number] $92   Plantar, fasciitis, LG, night splint   Norman Jean DPM     order for DME Equipment being ordered: Challenger Wide walker if available - patient needs seat, basket and brakes.   Ximena Carpenter NP     ORDER FOR DME Use your CPAP device as directed by your provider. Pressure change to min 13 max 18cwp   Martina Jesus MD     Respiratory Therapy Supplies (NEBULIZER COMPRESSOR) KIT 1 Device 4 times daily as needed.   Roc Plasencia MD        Current Meds    apixaban ANTICOAGULANT  5 mg Oral BID     atorvastatin  40 mg Oral Daily     [Held by provider] bumetanide  1 mg Oral Daily     calcium gluconate  1 g Intravenous Once     dextrose 10%  300 mL Intravenous Once     diltiazem ER  120 mg Oral BID     gabapentin  600 mg Oral BID     hydrALAZINE  10 mg Oral TID     insulin aspart  1-22 Units Subcutaneous TID AC     insulin aspart  1-16 Units Subcutaneous At Bedtime     insulin aspart   Subcutaneous TID w/meals     insulin glargine  75 Units Subcutaneous Daily     insulin regular  10 Units Intravenous Once     levothyroxine  200 mcg Oral Once per day on Mon Tue Wed Thu Fri Sat      levothyroxine  400 mcg Oral Weekly     metoprolol succinate ER  50 mg Oral Daily     miconazole   Topical BID     nicotine  1 patch Transdermal Daily    And     nicotine   Transdermal Q8H OPHELIA     polyethylene glycol  17 g Oral Daily     sennosides  8.6 mg Oral BID     sodium chloride (PF)  10-40 mL Intracatheter Q8H     [Held by provider] spironolactone  50 mg Oral Daily     umeclidinium  1 puff Inhalation Daily     Infusion Meds    - MEDICATION INSTRUCTIONS -       ACE/ARB/ARNI NOT PRESCRIBED       ALLERGIES:    Allergies   Allergen Reactions     Lisinopril Angioedema     Penicillins Other (See Comments) and Unknown     CHILDHOOD ALLERGY  CHILDHOOD ALLERGY     Ibuprofen Hives and Rash     Ibuprofen Sodium Hives and Rash       REVIEW OF SYSTEMS:  A comprehensive of systems was negative except as noted above.    SOCIAL HISTORY:   Social History     Socioeconomic History     Marital status: Single     Spouse name: Not on file     Number of children: Not on file     Years of education: Not on file     Highest education level: Not on file   Occupational History     Not on file   Tobacco Use     Smoking status: Light Smoker     Packs/day: 0.25     Years: 13.00     Pack years: 3.25     Types: Cigarettes     Smokeless tobacco: Never     Tobacco comments:     down to 2 cigs a day   Substance and Sexual Activity     Alcohol use: No     Drug use: No     Sexual activity: Yes     Partners: Male     Birth control/protection: Condom   Other Topics Concern     Parent/sibling w/ CABG, MI or angioplasty before 65F 55M? Not Asked   Social History Narrative    Single, no children        Gyn:        Last pap several years ago, no abnormal    No STIs            Patient is single.  She is no longer working.  She used to work in the area of customer service.  She is currently living with her sister in [place], [place].  She has no pets.  Patient has smoked a half pack of cigarettes a day for the past 10 plus years.  She states  "that she is down to 5 cigarettes a day with the aid of Wellbutrin.  She does not smoke cigars, pipes or chew tobacco.  She has 1 cup of coffee in the morning.  She does not drink alcohol.  Patient does not exercise.      Social Determinants of Health     Financial Resource Strain: Not on file   Food Insecurity: Not on file   Transportation Needs: Not on file   Physical Activity: Not on file   Stress: Not on file   Social Connections: Not on file   Intimate Partner Violence: Not on file   Housing Stability: Not on file     FAMILY MEDICAL HISTORY:  Family History   Problem Relation Age of Onset     Thyroid Disease Mother         Grave's D     Diabetes Mother      Heart Disease Mother      Cerebrovascular Disease Mother         dec. 54 yo     Hypertension Mother      Heart Disease Sister         Heart Murmur     Diabetes Sister      Heart Disease Father         dec in his 30s, MI     Psychotic Disorder Brother         Bipolar     Diabetes Brother      Thyroid Disease Brother         Hyper Thyroid     Heart Disease Brother      Thyroid Disease Sister         Hyper Thyroid     Thyroid Disease Sister         Hyper Thyroid     Thyroid Disease Sister         Mental Health Problems     Thyroid Disease Maternal Aunt      Thyroid Disease Maternal Uncle      Cancer No family hx of      Glaucoma No family hx of      Macular Degeneration No family hx of      PHYSICAL EXAM:   Temp  Av  F (36.7  C)  Min: 93.7  F (34.3  C)  Max: 100  F (37.8  C)      Pulse  Av  Min: 68  Max: 146 Resp  Av.2  Min: 10  Max: 27  FiO2 (%)  Av %  Min: 30 %  Max: 30 %  SpO2  Av.5 %  Min: 78 %  Max: 100 %       BP (!) 108/96   Pulse 114   Temp 97.3  F (36.3  C) (Oral)   Resp 20   Ht 1.702 m (5' 7\")   Wt (!) 195 kg (430 lb)   SpO2 98%   BMI 67.35 kg/m     Date 23 0700 - 23 0659   Shift 1782-6409 0534-7831 2688-4862 24 Hour Total   INTAKE   P.O. 640   640   Shift Total(mL/kg) 640(3.28)   640(3.28)   OUTPUT   Urine " 1200   1200   Shift Total(mL/kg) 1200(6.15)   1200(6.15)   Weight (kg) 195.04 195.04 195.04 195.04     Admit Weight: (!) 214.1 kg (472 lb)    GENERAL APPEARANCE: no distress, awake  EYES: no scleral icterus, pupils equal  Pulmonary: lungs clear to auscultation anteriorly  CV: regular rhythm, normal rate, no rub   - Edema trace pitting  GI: soft, nontender, obese  MS: no overt evidence of inflammation in joints  : no phan  SKIN: BLE with skin peeling  NEURO: face symmetric, AOx3, normal speech  PSYCH: normal affect    LABS:   I have reviewed the following labs:  CMP  Recent Labs   Lab 02/11/23  1452 02/11/23  1326 02/11/23  1324 02/11/23  0810 02/11/23  0641 02/10/23  0746 02/10/23  0649 02/09/23  0723 02/09/23  0659 02/08/23  0735 02/08/23  0708   NA  --   --  129*  --  132*  --  131*  --  131*  --  131*   POTASSIUM  --   --  6.8*  --  6.1*  --  5.5*  --  5.2  --  4.9   CHLORIDE  --   --  92*  --  94*  --  92*  --  91*  --  92*   CO2  --   --  27  --  27  --  28  --  29  --  29   ANIONGAP  --   --  10  --  11  --  11  --  11  --  10   * 229* 208* 130* 103*   < > 215*   < > 301*   < > 246*   BUN  --   --  78.4*  --  81.6*  --  69.2*  --  63.8*  --  55.0*   CR  --   --  2.08*  --  2.16*  --  1.96*  --  1.75*  --  1.45*   GFRESTIMATED  --   --  29*  --  27*  --  31*  --  35*  --  44*   JUSTIN  --   --  9.0  --  8.9  --  9.2  --  9.2  --  9.0   MAG  --   --   --   --  2.4*  --  2.0  --  2.0  --  1.9    < > = values in this interval not displayed.     CBC  Recent Labs   Lab 02/09/23  0659 02/08/23  0708 02/07/23  0622 02/06/23  0519   HGB 13.8 14.2 13.9 14.1   WBC 8.4 7.9 7.1 6.8   RBC 4.66 4.76 4.81 4.80   HCT 44.5 45.9 46.2 46.8   MCV 96 96 96 98   MCH 29.6 29.8 28.9 29.4   MCHC 31.0* 30.9* 30.1* 30.1*   RDW 16.4* 16.1* 16.1* 16.1*    314 309 289     INRNo lab results found in last 7 days.  ABGNo lab results found in last 7 days.   URINE STUDIES  Recent Labs   Lab Test 02/11/23  0537 02/02/23  1458  01/23/23 2054   COLOR Orange* Orange* Light Yellow   APPEARANCE Cloudy* Slightly Cloudy* Clear   URINEGLC Negative Negative Negative   URINEBILI Negative Negative Negative   URINEKETONE Negative Negative Negative   SG 1.014 1.018 1.008   UBLD Small* Large* Trace*   URINEPH 7.0 5.0 5.0   PROTEIN 300* 30* 20*   NITRITE Negative Negative Negative   LEUKEST Large* Large* Negative   RBCU 9* >182* 1   WBCU >182* >182* <1     No lab results found.  PTH  Recent Labs   Lab Test 02/07/18  1225   PTHI 208*     IRON STUDIES  Recent Labs   Lab Test 10/20/21  1315 08/19/20  1626   IRON 60 64    270   IRONSAT 25 24       IMAGING:  All imaging studies reviewed by me.     Saad Jung MD

## 2023-02-11 NOTE — PROGRESS NOTES
No beds on [facility] today. Will try again tomorrow.   ________________    DANIEL Zaidi, [facility]  6[facility]   [facility]  Phone: [phone]  Pager: [phone]  Fax: [phone]

## 2023-02-11 NOTE — PROGRESS NOTES
Cardiology Progress Note      Assessment & Plan:  Pricilla Brown is a 47 year old female with a history of HFmrEF (EF 40-45%) 2/2 NICM, long-standing persistent AF, HTN, type II DM, morbid obesity, asthma, JYOTI and Graves disease s/p radioiodine ablation c/b hypothyroid admitted 1/23/2023 for heart failure exacerbation with hospital course complicated by CO2 retention and being obtunded, transferred to ICU on 1/28/2023 for encephalopathy suspected secondary to hypercapnia in setting of aggressive diuresis and contraction alkalosis despite using diamox. Initially transferred for intubation, but patient remained on BiPAP throughout ICU admission. Improved 1/29 after holding diuretics. Currently net negative -64L and ~130lbs since admission, tolerating PO diuretics, pending ARU placement.    Today's Update:  - Repeat echo   - Hyperkalemic this AM, repeat BMP @ 12 and EKG    # Acute on Chronic Systolic Heart Failure (EF 40-45%)  # Long-Standing Persistent Atrial Fibrllation  # Hypertension   # Troponin Elevation 2/2 CHF, resolved     Pt admitted with c/o increasing SOB, MURRAY, chest tightness, weight gain in spite of compliance with PO diuretics. NT BNP 5891 (was 2030 during most recent admission).  Lactic acid 2.2 on admission with improvement to 1.1 with diuresis. Troponin mildly elevated, 67-->75-->60 likely 2/2 CHF exacerbation; no concern for ACS currently. Most recent Echo with EF 40-45%. Coronary angiogram 2012 with no significant CAD. Of note, pt recently admitted 12/25-1/1 HF exacerbation, diuresed to 472lbs. Pt weight on admission 1/23 555lbs.   - Volume Status: Euvolemic  - Diuresis: Bumex held due to NIRAJ              - New EDW is 430 lbs  - Beta Blocker: Continue Toprol XL to 50mg  - ACEi/ARB/ARNI: contraindicated d/t lisinopril allergy.   - SGLT2i: Contraindicated with recurrent yeast infections   - Aldosterone Antagonist: Hold PTA Spironolactone 50mg d/t NIRAJ - K 5.5 (5.2)  - Rate Control: BB as  above. Cont diltiazem 120 BID due to persistent tachycardia and borderline reduced EF.   - Afterload reduction: Cont hydralazine 10mg PO TID (hold parameters SBP <110)  - Anticoagulation: CHADSVASC 3; Continue PTA apixaban  - Strict I/O & Daily Weights  - Low Na Diet / 2L Fluid Restriction  - Daily BMPs  - CORE consulted; recommend CardioMEMS discussion at this appointment ([facility] clinic NP aware).     # Acute on chronic Kidney Disease Stage 3b  # Hyperkalemia  Longstanding history of diabetes and HTN. Baseline Creatinine appears to be around 1.2-1.4. Creatinine on admission 1.56.   - Daily BMPs (see above, for diuresis)  - Labs today: Crt  2.16 (1.96), BUN 81.6 (69.2), GFR 27 (31), K 6.1 (5.5)   - Hold spironolactone and bumex  - Avoid nephrotoxic agents  - EKG without acute T wave changes  - Repeat BMP this afternoon at 12, may need renal consult if labs continue to worsen     # Graves Disease s/p Radioiodine Ablation  # Hypothyroidism 2/2 Radioiodine Ablation  TSH 20.4 and free T4 0.82 on admission.   - Endocrine consulted; recommendations below   - Continue levothyroxine 200 mcg M-Sat- 400 mcg on Sunday.    - Recheck TFT in a month (March 2023)     # Type 2 Diabetes Mellitus  # Morbid obesity  A1c 13.5% on 12/25/2022. Discrepancies in patient's home regimen. Endocrinology consulted during admission, signed off, and re-consulted 2/1/23 -- BGs now range 180-280s  - Appreciate ongoing endo recommendations  - Insulin gtt started 2/1 per endocrinology, transitioned back to subcutaneous lantus and novolog on 2/3/23.   - Lantus increased per endocrinology to 70 units daily per Endocrine   - Continue Novolog mealtime insulin  1:4g CHO(while inpatient)  - Continue high intensity SSI   - POCT glucose checks QID   - Hypoglycemia protocol  - Ok to resume PTA Ozempic at discharge (not covered on formulary inpatient)  - Discharge recommendations per endocrinology; Toujeo 40 at bedtime, aspart 10u w/ each meal +  additional coverage based on pre-meal glucose (140-160) + 1 unit, (161-180) + 2 units, (181-200) + 3 units, and resume Ozempic 1mg weekly.     # Right Toe Wound  Present on admission, right great toe and 3rd toe, dry gangrene, pt unaware how long wounds have been present.   - WOCN following: wound cares to right toe daily. Paint toes with betadine. Be sure to apply over dry eschar. Ok to leave open to air. No soaking feet.  - Vascular consulted; JOSETTE's completed showing patent vessels and normal waveforms. They recommend orthopedic foot and ankle consult   - Ortho consulted; plan for outpatient follow up to discuss elective amputation on 2/17/23 at 10:25 with Dr. Oscar Leahy.  - Podiatry consult 2/8 per endo recs  - Continue with LE elevation, able to ambulate w/ PT/OT     #Acute simple cystitis, resolved  Positive UA 2/2/23 and urine dicoloration.   -Macrobid 100mg BID x5 days through 2/7, course completed     # Asthma  - Continue PTA inhalers (levalbuterol and spiriva)  - Magic mouthwash ordered       # JYOTI  - Continue BiPAP at night    # Hyperlipidemia   - Continue lipitor 40 mg daily     # Pain  Patient reports pain is improved. Has been using narcotics (percocet) along with shae, topical nsaids, and muscle relaxants. Aggressive diuresis vs diabetic neuropathy.  - Pain management curbside consulted 1/31; decrease gabapentin to BID, stop flexeril, add Robaxin 400 q6hr PRN  - PRN Lidocaine patches    FEN: 2 gm Sodium / 2L Fluid restriction  Code status: Full  Prophylaxis: PO Eliquis  Isolation: None  Disposition: Pending ARU placement      Patient seen and discussed with Dr. Reid, who agrees with above plan.    Lauren Madrid, APRN, CNP  [facility]  C[address]  Ascom [id number]    Medical Decision Making       45 MINUTES SPENT BY ME on the date of service doing chart review, history, exam, documentation & further activities per the note.      Interval History:  Patient tearful this  "morning, states she had a 'rough night' with phan insertion, nurses interrupting her frequently. Provided empathetic listening and discussed with charge RN  Repeat labs ordered for this afternoon, concern for worsening hyperkalemia/kidney function.    Most recent vital signs:  /70 (BP Location: Right arm, Cuff Size: Adult Small)   Pulse 71   Temp 97.6  F (36.4  C) (Oral)   Resp 22   Ht 1.702 m (5' 7\")   Wt (!) 195 kg (430 lb)   SpO2 94%   BMI 67.35 kg/m    Temp:  [97.6  F (36.4  C)-98  F (36.7  C)] 97.6  F (36.4  C)  Pulse:  [71-89] 71  Resp:  [20-22] 22  BP: (103-105)/(70-94) 103/70  SpO2:  [90 %-99 %] 94 %  Wt Readings from Last 2 Encounters:   02/10/23 (!) 195 kg (430 lb)   01/01/23 (!) 214.4 kg (472 lb 9.6 oz)       Intake/Output Summary (Last 24 hours) at 2/11/2023 0710  Last data filed at 2/11/2023 0500  Gross per 24 hour   Intake 1620 ml   Output 1675 ml   Net -55 ml       Physical exam:  General: Pleasant elderly female. Appears comfortable and in no acute distress. Alert and interactive  HEENT: Normocephalic, atraumatic. No scleral icterus or injection  Neck: JVP difficult to assess due to body habitus  CARDIAC: Irregularly irregular rate and rhythm, no m/r/g appreciated. Peripheral pulses 2+  RESP: Normal work of breathing on room air without use of accessory breathing muscles. Clear to auscultation in all fields. No wheezes, rhonchi or crackles appreciated.  GI: No abdominal distention. Soft and nontender.   EXTREMITIES: Without lower extremity edema. No cyanosis or clubbing. Warm and well perfused.  SKIN: R great toe & 4th toe cyanotic and peeling. Warm and dry to touch  NEURO: Alert and oriented X3, CN II-XII grossly intact, no focal neurological deficits noted, normal speech  PSYCH: Mood and affect are appropriate      Labs (Past three days):  CBC  Recent Labs   Lab 02/09/23  0659 02/08/23  0708 02/07/23  0622 02/06/23  0519   WBC 8.4 7.9 7.1 6.8   RBC 4.66 4.76 4.81 4.80   HGB 13.8 14.2 " 13.9 14.1   HCT 44.5 45.9 46.2 46.8   MCV 96 96 96 98   MCH 29.6 29.8 28.9 29.4   MCHC 31.0* 30.9* 30.1* 30.1*   RDW 16.4* 16.1* 16.1* 16.1*    314 309 289     BMP  Recent Labs   Lab 02/11/23  0441 02/10/23  2356 02/10/23  2139 02/10/23  2024 02/10/23  0746 02/10/23  0649 02/09/23  0723 02/09/23  0659 02/08/23  0735 02/08/23  0708 02/07/23  0745 02/07/23  0622   NA  --   --   --   --   --  131*  --  131*  --  131*  --  133*   POTASSIUM  --   --   --   --   --  5.5*  --  5.2  --  4.9  --  4.6   CHLORIDE  --   --   --   --   --  92*  --  91*  --  92*  --  92*   CO2  --   --   --   --   --  28  --  29  --  29  --  32*   ANIONGAP  --   --   --   --   --  11  --  11  --  10  --  9   * 91 83 87   < > 215*   < > 301*   < > 246*   < > 332*   BUN  --   --   --   --   --  69.2*  --  63.8*  --  55.0*  --  50.7*   CR  --   --   --   --   --  1.96*  --  1.75*  --  1.45*  --  1.28*   GFRESTIMATED  --   --   --   --   --  31*  --  35*  --  44*  --  51*   JUSTIN  --   --   --   --   --  9.2  --  9.2  --  9.0  --  9.1   MAG  --   --   --   --   --  2.0  --  2.0  --  1.9  --  1.8    < > = values in this interval not displayed.     Troponins:   Lab Results   Component Value Date    TROPONIN 0.01 01/05/2013        EKG 2/1/23, normal potassium:      EKG 2/11/2023, hyperkalemia:    1/24/23 Echo:  Technically difficult study.Extremely difficult acoustic windows despite the  use of contrast for endcardial border definition.  Left ventricular function is decreased. The ejection fraction is 40-45%  (mildly reduced).  Right ventricular function cannot be assessed due to poor image quality.  IVC diameter >2.1 cm collapsing <50% with sniff suggests a high RA pressure  estimated at 15 mmHg or greater.     This study was compared with the study from 6/14/2022. No significant changes  Noted.    Echocardiogram 2/11/23:  Interpretation Summary  Left ventricular function is decreased. The ejection fraction is 30-35%  (moderately  reduced).  Global right ventricular function is mildly to moderately reduced.  The estimated mean right atrial pressure is elevated ~15 mmHg.  No pericardial effusion is present.    1/27/23 Right JOSETTE:                                                                  IMPRESSION: Negative study. Triphasic waveforms and adequate  velocities in the right posterior tibial, anterior tibial, and  dorsalis pedis arteries.     No results found for this or any previous visit (from the past 24 hour(s)).

## 2023-02-11 NOTE — PROGRESS NOTES
Brief cardiology update:    Hyperkalemia worsening on repeat labs, patient stating she feels tired throughout day. Potassium increased from 6.1 to 6.8, hyperkalemia protocol initiated (of note, per protocol patient should receive 27 unit(s) insulin but protocol is max at 10 units), will redose Bumex 2 mg IV per nephrology consult.

## 2023-02-12 NOTE — PLAN OF CARE
Neuro: A&Ox4.   Cardiac: Afebrile, VSS. A-fib, controlled   Respiratory: RA, lung sounds clear, denies SOB at rest, dyspnea on exertion  GI/: Voiding spontaneously with phan. 1 incontinent BM this shift.  Diet/appetite: Tolerating consistent carb 2g K 2g Na diet, appetite is good. Denies nausea. BG checks ACHS + carb coverage  Activity: Up with lift assist, Q2 turns  Pain: C/o pain 8/10 in lower extremities controlled with PRN percocet, robaxin, and tylenol  Skin: No new deficits noted.  Lines: 2x L PIV, SL  Drains: Phan, WDL  Replacements: none given    Plan: Had a bg of 68 this evening, gave 50mL of D50, bg came back up to 131. Continue with current POC. Report changes to primary team.

## 2023-02-12 NOTE — PLAN OF CARE
"BP (!) 89/59   Pulse 84   Temp 98.6  F (37  C) (Oral)   Resp 18   Ht 1.702 m (5' 7\")   Wt (!) 195 kg (430 lb)   SpO2 93%   BMI 67.35 kg/m      Shift: [id number]  Reason for Admission: heart failure exacerbation with hospital course complicated by CO2 retention and being obtunded, transferred to ICU on 1/28/2023 for encephalopathy suspected secondary to hypercapnia in setting of aggressive diuresis  VS/Tele: VSS. Afebrile. A. fib  Neuros: A/Ox4, able to make needs known. Neuropathy, numbness/tingling in BLE.  Respiratory: Sats >92% on RA  GI/: No BMs this shift, Keenan in place  Nutrition: Tolerating CHO, K+/Na restriction diet.   Drains/Lines: 2x L. PIV SL  Activity: A2 + lift. Worked with OT today. Up in chair  Pain/Nausea: C/o of pain in BLE- PRN Percocet given. Denies nausea  Skin: R. Foot/toes- necrotic/black, betadine swabs used  Labs: BG ACHS.   New this shift: Lokelma started this shift.  Plan of care: Will continue to monitor and follow POC.     "

## 2023-02-12 NOTE — PROGRESS NOTES
[facility]  Diabetes Consult Daily Progress Note           Assessment and Plan:   Assessment:  Principal Problem:    Acute HFrEF (heart failure with reduced ejection fraction) (H)  Graves s/p ROGER now w/ postablative hypothyroidism  Diabetes Mellitus type 2    Plan:  - Diabetes Mellitus   - Continue to hold Ozempic 1mg daily due to  NIRAJ   -  Decrease Lantus 70u daily but increase to 75 2/13( increased eating already and creat better)   -  Decrease Aspart 1:3u with meals   -  Aspart very high slide 1:10            - Hypoglycemia protocol            - diabetes education needs will be assessed closer to discharge            - Outpatient follow up: Tehlma Archibald/Dr Silva [facility]    _awaiting for ARU bed            BG are higher. Last got dextrose and regular insulin at 00:00 for elevated K.  Diuresis held and creat down to 1.8  Decrease lantus dose and increase carb coverage--->   this am  When she eats bg are very high despite a lot of insulin    Having some nausea and constipation.  Not eating as she usually does yesterday but eating a lot today         Significant Problems:     Past Medical History:   Diagnosis Date     A-fib (H) 2011    on coumadin since 1/13     Asthma     as a kid     Chest pain 2/1/2017     Chronic anticoagulation for a-fib 2/15/2013    INR's followed by coumadin clinic at      Diabetes mellitus (H) 2012     Diastolic heart failure 2/15/2013     Dilated cardiomyopathy (H) 1/8/2013     HTN (hypertension)      Hyperthyroidism     Graves, s/p I131 1/13, now on prednisone and methimazole     Mixed type age-related cataract, both eyes 4/8/2022     Morbid obesity (H)      Pulmonary embolism (H) 1/12    hospitalized in [place], on lovenox/coumadin for a few months but stopped, hypercoag w/u neg per pt     Sleep apnea     using CPAP              Review of Systems:     The Review of Systems is negative other than noted in the HPI            Medications:     Current  Facility-Administered Medications   Medication     acetaminophen (TYLENOL) tablet 650 mg     albuterol (PROVENTIL HFA/VENTOLIN HFA) inhaler     albuterol (PROVENTIL) neb solution 2.5 mg     apixaban ANTICOAGULANT (ELIQUIS) tablet 5 mg     atorvastatin (LIPITOR) tablet 40 mg     benzocaine-menthol (CHLORASEPTIC MAX) 15-10 MG lozenge 1 lozenge     bisacodyl (DULCOLAX) suppository 10 mg     [Held by provider] bumetanide (BUMEX) tablet 1 mg     capsaicin (ZOSTRIX) 0.025 % cream     clotrimazole (LOTRIMIN) 1 % cream     glucose gel 15-30 g    Or     dextrose 50 % injection 25-50 mL    Or     glucagon injection 1 mg     diclofenac (VOLTAREN) 1 % topical gel 2 g     diltiazem ER (CARDIZEM SR) 12 hr capsule 120 mg     gabapentin (NEURONTIN) capsule 600 mg     heparin lock flush 10 UNIT/ML injection 5-20 mL     hydrALAZINE (APRESOLINE) tablet 10 mg     hydrOXYzine (ATARAX) tablet 25 mg    Or     hydrOXYzine (ATARAX) tablet 50 mg     insulin aspart (NovoLOG) injection (RAPID ACTING)     insulin aspart (NovoLOG) injection (RAPID ACTING)     insulin aspart (NovoLOG) injection (RAPID ACTING)     insulin aspart (NovoLOG) injection (RAPID ACTING)     [START ON 2/13/2023] insulin glargine (LANTUS PEN) injection 75 Units     labetalol (NORMODYNE/TRANDATE) injection 10 mg     levothyroxine (SYNTHROID/LEVOTHROID) tablet 200 mcg     levothyroxine (SYNTHROID/LEVOTHROID) tablet 400 mcg     magic mouthwash suspension (diphenhydramine, lidocaine, aluminum-magnesium & simethicone)     methocarbamol (ROBAXIN) tablet 500 mg     metoprolol succinate ER (TOPROL XL) 24 hr tablet 50 mg     miconazole (MICATIN) 2 % cream     naloxone (NARCAN) injection 0.2 mg    Or     naloxone (NARCAN) injection 0.4 mg    Or     naloxone (NARCAN) injection 0.2 mg    Or     naloxone (NARCAN) injection 0.4 mg     nicotine (NICODERM CQ) 14 MG/24HR 24 hr patch 1 patch    And     nicotine Patch in Place     ondansetron (ZOFRAN ODT) ODT tab 4 mg      "oxyCODONE-acetaminophen (PERCOCET) 5-325 MG per tablet 1 tablet     Patient is already receiving anticoagulation with heparin, enoxaparin (LOVENOX), warfarin (COUMADIN)  or other anticoagulant medication     polyethylene glycol (MIRALAX) Packet 17 g     Reason ACE/ARB/ARNI order not selected     sennosides (SENOKOT) tablet 8.6 mg     simethicone (MYLICON) chewable tablet 80 mg     sodium chloride (PF) 0.9% PF flush 10-20 mL     sodium chloride (PF) 0.9% PF flush 10-40 mL     sodium zirconium cyclosilicate (LOKELMA) packet 10 g     [Held by provider] spironolactone (ALDACTONE) tablet 50 mg     umeclidinium (INCRUSE ELLIPTA) 62.5 MCG/ACT inhaler 1 puff     Facility-Administered Medications Ordered in Other Encounters   Medication     sodium chloride (PF) 0.9% PF flush 10 mL            Physical Exam:   Blood pressure (!) 89/59, pulse 84, temperature 98.6  F (37  C), temperature source Oral, resp. rate 18, height 1.702 m (5' 7\"), weight (!) 195 kg (430 lb), SpO2 93 %, not currently breastfeeding.      Exam:  Constitutional:  alert, no acute distress, laying in bed  ENT: visible goiter  Respiratory: nonlabored  Gastrointestinal: Abdomen soft, non-tender.  Musculoskeletal: extremities normal- no gross deformities noted, gait normal and normal muscle tone  Skin: no suspicious lesions or rashes  Neurologic: Gait normal. sensation grossly intact  Psychiatric: mentation appears normal, calm              Data:   All laboratory data reviewed     ROUTINE IP LABS (Last four results)  BMP  Recent Labs   Lab 02/12/23  1143 02/12/23  1140 02/12/23  0651 02/12/23  0544 02/12/23  0024 02/11/23  2244 02/11/23  2125 02/11/23  1912 02/11/23  1326 02/11/23  1324 02/11/23  0810 02/11/23  0641   *  --   --  130*  --   --   --   --   --  129*  --  132*   POTASSIUM 4.8  --   --  5.2  --  7.2*  --  5.7*   < > 6.8*  --  6.1*   CHLORIDE 92*  --   --  92*  --   --   --   --   --  92*  --  94*   JUSTIN 9.2  --   --  9.4  --   --   --   --   --  " 9.0  --  8.9   CO2 27  --   --  21*  --   --   --   --   --  27  --  27   BUN 78.4*  --   --  78.9*  --   --   --   --   --  78.4*  --  81.6*   CR 1.82*  --   --  1.85*  --   --   --   --   --  2.08*  --  2.16*   * 270* 112* 135*   < >  --    < >  --    < > 208*   < > 103*    < > = values in this interval not displayed.     CBC  Recent Labs   Lab 02/12/23  0346 02/09/23  0659 02/08/23  0708 02/07/23  0622   WBC 7.4 8.4 7.9 7.1   RBC 4.49 4.66 4.76 4.81   HGB 13.2 13.8 14.2 13.9   HCT 42.8 44.5 45.9 46.2   MCV 95 96 96 96   MCH 29.4 29.6 29.8 28.9   MCHC 30.8* 31.0* 30.9* 30.1*   RDW 16.6* 16.4* 16.1* 16.1*    331 314 309     INRNo lab results found in last 7 days.      Jamilah Prater PA-C  Inpatient Diabetes Service  Pager   [phone]    Contacting the Inpatient Diabetes Team   From 8AM-4PM: page inpatient diabetes provider that is following the patient, or utilize the job code paging system.   From 4PM-8AM: page the job code for endocrine fellow on call.        Please use the following job code to reach the Inpatient Diabetes team. Note that you must use an in house phone and that job codes cannot receive text pages.     Dial [phone] (or star-star-star [phone] on the new [employer] telephones), then [phone] to reach the endocrine-diabetes provider on call.      Review of prior external note(s) from - [facility] or Care Everywhere   35 minutes spent on the date of the encounter doing chart review, history and exam, documentation and further activities per the note     Over 50% of my time on the unit was spent counseling the patient and/or coordinating care regarding acute hyperglycemia management.  See note for details.

## 2023-02-12 NOTE — PROGRESS NOTES
Cardiology Progress Note      Assessment & Plan:  Pricilla Brown is a 48 year old female with a history of HFmrEF (EF 40-45%) 2/2 NICM, long-standing persistent AF, HTN, type II DM, morbid obesity, asthma, JYOTI and Graves disease s/p radioiodine ablation c/b hypothyroid admitted 1/23/2023 for heart failure exacerbation with hospital course complicated by CO2 retention and being obtunded, transferred to ICU on 1/28/2023 for encephalopathy suspected secondary to hypercapnia in setting of aggressive diuresis and contraction alkalosis despite using diamox. Initially transferred for intubation, but patient remained on BiPAP throughout ICU admission. Improved 1/29 after holding diuretics. Currently net negative -64L and ~130lbs since admission, tolerating PO diuretics, pending ARU placement.    Today's Update:  - Continue close monitoring of potassium, appreciate nephrology input  - Recheck BMP @ 1200  - Continue to hold simeon and bumex    # Acute on Chronic Systolic Heart Failure (EF 40-45%)  # Long-Standing Persistent Atrial Fibrllation  # Hypertension   # Troponin Elevation 2/2 CHF, resolved     Pt admitted with c/o increasing SOB, MURRAY, chest tightness, weight gain in spite of compliance with PO diuretics. NT BNP 5891 (was 2030 during most recent admission).  Lactic acid 2.2 on admission with improvement to 1.1 with diuresis. Troponin mildly elevated, 67-->75-->60 likely 2/2 CHF exacerbation; no concern for ACS currently. Most recent Echo with EF 40-45%. Coronary angiogram 2012 with no significant CAD. Of note, pt recently admitted 12/25-1/1 HF exacerbation, diuresed to 472lbs. Pt weight on admission 1/23 555lbs.   - Volume Status: Euvolemic  - Diuresis: Bumex held due to NIRAJ              - New EDW is 430 lbs  - Beta Blocker: Continue Toprol XL to 50mg  - ACEi/ARB/ARNI: contraindicated d/t lisinopril allergy.   - SGLT2i: Contraindicated with recurrent yeast infections   - Aldosterone Antagonist: Hold/discontinue PTA  Spironolactone 50mg d/t hyperkalemia  - Rate Control: BB as above. Cont diltiazem 120 BID due to persistent tachycardia and borderline reduced EF.   - Afterload reduction: Cont hydralazine 10mg PO TID (hold parameters SBP <110)  - Anticoagulation: CHADSVASC 3; Continue PTA apixaban  - Strict I/O & Daily Weights  - Low Na Diet / 2L Fluid Restriction  - Daily BMPs  - CORE consulted; recommend CardioMEMS discussion at this appointment (Yuliet, [facility] clinic NP aware).     # Acute on chronic Kidney Disease Stage 3b  Longstanding history of diabetes and HTN. Baseline Creatinine appears to be around 1.2-1.4. Creatinine on admission 1.56.   - Daily BMPs (see above, for diuresis)  - Labs today: Crt  1.85 (2.08), BUN 78.9 (78.4), GFR 33 (29), K 5.2 (7.2)   - Hold spironolactone and bumex  - Avoid nephrotoxic agents    # Hyperkalemia  - Potassium continually rising since 2/9 with peak 2/11 @ 7.3  - EKG without acute changes, rhythm stable in a fib  - Potassium shifted with insulin, calcium gluconate for cardiac stability, lokelma 15 g X 1, IV Bumex total of 6 mg  - Nephrology consulted, appreciate recs  - Holding spironolactone  - K 5.2 this AM, will recheck this afternoon     # Graves Disease s/p Radioiodine Ablation  # Hypothyroidism 2/2 Radioiodine Ablation  TSH 20.4 and free T4 0.82 on admission.   - Endocrine consulted; recommendations below   - Continue levothyroxine 200 mcg M-Sat- 400 mcg on Sunday.    - Recheck TFT in a month (March 2023)     # Type 2 Diabetes Mellitus  # Morbid obesity  A1c 13.5% on 12/25/2022. Discrepancies in patient's home regimen. Endocrinology consulted during admission, signed off, and re-consulted 2/1/23 -- BGs now range 180-280s  - Appreciate ongoing endo recommendations  - Insulin gtt started 2/1 per endocrinology, transitioned back to subcutaneous lantus and novolog on 2/3/23.   - Lantus increased per endocrinology to 70 units daily per Endocrine   - Continue Novolog mealtime insulin  1:4g  CHO(while inpatient)  - Continue high intensity SSI   - POCT glucose checks QID   - Hypoglycemia protocol  - Ok to resume PTA Ozempic at discharge (not covered on formulary inpatient)  - Discharge recommendations per endocrinology; Toujeo 40 at bedtime, aspart 10u w/ each meal + additional coverage based on pre-meal glucose (140-160) + 1 unit, (161-180) + 2 units, (181-200) + 3 units, and resume Ozempic 1mg weekly.     # Right Toe Wound  Present on admission, right great toe and 3rd toe, dry gangrene, pt unaware how long wounds have been present.   - WOCN following: wound cares to right toe daily. Paint toes with betadine. Be sure to apply over dry eschar. Ok to leave open to air. No soaking feet.  - Vascular consulted; JOSETTE's completed showing patent vessels and normal waveforms. They recommend orthopedic foot and ankle consult   - Ortho consulted; plan for outpatient follow up to discuss elective amputation on 2/17/23 at 10:25 with Dr. Oscar Leahy.  - Podiatry consult 2/8 per endo recs  - Continue with LE elevation, able to ambulate w/ PT/OT     #Acute simple cystitis, resolved  Positive UA 2/2/23 and urine dicoloration.   -Macrobid 100mg BID x5 days through 2/7, course completed     # Asthma  - Continue PTA inhalers (levalbuterol and spiriva)  - Magic mouthwash ordered       # JYOTI  - Continue BiPAP at night    # Hyperlipidemia   - Continue lipitor 40 mg daily     # Pain  Patient reports pain is improved. Has been using narcotics (percocet) along with shae, topical nsaids, and muscle relaxants. Aggressive diuresis vs diabetic neuropathy.  - Pain management curbside consulted 1/31; decrease gabapentin to BID, stop flexeril, add Robaxin 400 q6hr PRN  - PRN Lidocaine patches     FEN: 2 gm Sodium / 2L Fluid restriction  Code status: Full  Prophylaxis: PO Eliquis  Isolation: None  Disposition: Pending ARU placement      Patient seen and discussed with Dr. Reid, who agrees with above plan.    Lauren Madrid, APRN,  "CNP  [facility]  Cards 1  [place]com [id number]    Medical Decision Making       60 MINUTES SPENT BY ME on the date of service doing chart review, history, exam, documentation & further activities per the note.      Interval History:  Potassium elevated throughout the night, shifted multiple times, normalized this morning. Appreciate nephrology assistance  Patient in better spirits today, it's her birthday so she is excited for family to hopefully visit.  HR and BP stable    Most recent vital signs:  /85   Pulse 76   Temp 97.4  F (36.3  C) (Axillary)   Resp 18   Ht 1.702 m (5' 7\")   Wt (!) 195 kg (430 lb)   SpO2 96%   BMI 67.35 kg/m    Temp:  [97.3  F (36.3  C)-97.9  F (36.6  C)] 97.4  F (36.3  C)  Pulse:  [] 76  Resp:  [18-20] 18  BP: ()/() 107/85  SpO2:  [92 %-98 %] 96 %  Wt Readings from Last 2 Encounters:   02/10/23 (!) 195 kg (430 lb)   01/01/23 (!) 214.4 kg (472 lb 9.6 oz)       Intake/Output Summary (Last 24 hours) at 2/12/2023 0644  Last data filed at 2/12/2023 0349  Gross per 24 hour   Intake 1738.75 ml   Output 4900 ml   Net -3161.25 ml       Physical exam:  General: Pleasant elderly female. Appears comfortable and in no acute distress. Alert and interactive  HEENT: Normocephalic, atraumatic. No scleral icterus or injection  Neck: JVP difficult assessment due to body habitus  CARDIAC: Irregularly irregular rate and rhythm, no m/r/g appreciated. Peripheral pulses 2+  RESP: Normal work of breathing on room air without use of accessory breathing muscles. Clear to auscultation in all fields. No wheezes, rhonchi or crackles appreciated.  GI: No abdominal distention. Soft and nontender.   EXTREMITIES: 1+ lower extremity edema. No cyanosis or clubbing. Warm and well perfused. No venous stasis changes.   SKIN: R great toe & 4th toe cyanotic and peeling. Warm and dry to touch  NEURO: Alert and oriented X3, CN II-XII grossly intact, no focal neurological deficits " noted, normal speech  PSYCH: Mood and affect are appropriate      Labs (Past three days):  CBC  Recent Labs   Lab 02/12/23  0346 02/09/23  0659 02/08/23  0708 02/07/23  0622   WBC 7.4 8.4 7.9 7.1   RBC 4.49 4.66 4.76 4.81   HGB 13.2 13.8 14.2 13.9   HCT 42.8 44.5 45.9 46.2   MCV 95 96 96 96   MCH 29.4 29.6 29.8 28.9   MCHC 30.8* 31.0* 30.9* 30.1*   RDW 16.6* 16.4* 16.1* 16.1*    331 314 309     BMP  Recent Labs   Lab 02/12/23  0457 02/12/23  0243 02/12/23  0152 02/12/23  0117 02/12/23  0024 02/11/23  2244 02/11/23  2125 02/11/23  1912 02/11/23  1629 02/11/23  1452 02/11/23  1326 02/11/23  1324 02/11/23  0810 02/11/23  0641 02/10/23  0746 02/10/23  0649 02/09/23  0723 02/09/23  0659 02/08/23  0735 02/08/23  0708   NA  --   --   --   --   --   --   --   --   --   --   --  129*  --  132*  --  131*  --  131*  --  131*   POTASSIUM  --   --   --   --   --  7.2*  --  5.7*  --  7.3*  --  6.8*  --  6.1*  --  5.5*  --  5.2  --  4.9   CHLORIDE  --   --   --   --   --   --   --   --   --   --   --  92*  --  94*  --  92*  --  91*  --  92*   CO2  --   --   --   --   --   --   --   --   --   --   --  27  --  27  --  28  --  29  --  29   ANIONGAP  --   --   --   --   --   --   --   --   --   --   --  10  --  11  --  11  --  11  --  10   * 147* 176* 189*   < >  --    < >  --    < > 224*   < > 208*   < > 103*   < > 215*   < > 301*   < > 246*   BUN  --   --   --   --   --   --   --   --   --   --   --  78.4*  --  81.6*  --  69.2*  --  63.8*  --  55.0*   CR  --   --   --   --   --   --   --   --   --   --   --  2.08*  --  2.16*  --  1.96*  --  1.75*  --  1.45*   GFRESTIMATED  --   --   --   --   --   --   --   --   --   --   --  29*  --  27*  --  31*  --  35*  --  44*   JUSTIN  --   --   --   --   --   --   --   --   --   --   --  9.0  --  8.9  --  9.2  --  9.2  --  9.0   MAG  --   --   --   --   --   --   --   --   --   --   --   --   --  2.4*  --  2.0  --  2.0  --  1.9    < > = values in this interval not displayed.      Troponins:   Lab Results   Component Value Date    TROPONIN 0.01 01/05/2013        INRNo lab results found in last 7 days.  Liver panel  Recent Labs   Lab 02/11/23  1452   ALBUMIN 3.1*       EKG 2/1/23, normal potassium:     EKG 2/11/2023, hyperkalemia:  1/24/23 Echo:  Technically difficult study.Extremely difficult acoustic windows despite the  use of contrast for endcardial border definition.  Left ventricular function is decreased. The ejection fraction is 40-45%  (mildly reduced).  Right ventricular function cannot be assessed due to poor image quality.  IVC diameter >2.1 cm collapsing <50% with sniff suggests a high RA pressure  estimated at 15 mmHg or greater.     This study was compared with the study from 6/14/2022. No significant changes  Noted.     Echocardiogram 2/11/23:  Interpretation Summary  Left ventricular function is decreased. The ejection fraction is 30-35%  (moderately reduced).  Global right ventricular function is mildly to moderately reduced.  The estimated mean right atrial pressure is elevated ~15 mmHg.  No pericardial effusion is present.    1/27/23 Right JOSETTE:                                                                  IMPRESSION: Negative study. Triphasic waveforms and adequate  velocities in the right posterior tibial, anterior tibial, and  dorsalis pedis arteries.     No results found for this or any previous visit (from the past 24 hour(s)).

## 2023-02-12 NOTE — PROGRESS NOTES
Nephrology Progress Note  02/12/2023         Assessment & Recommendations:   Pricilla Brown is a 47 year old with PMH HFmrEF (EF 40-45%) 2/2 NICM, long-standing persistent AF, HTN, type II DM, morbid obesity, asthma, JYOTI and Graves disease s/p radioiodine ablation c/b hypothyroid admitted 1/23/2023 for heart failure exacerbation. Nephrology consulted for hyperkalemia.    NIRAJ, non-oliguric on CKD  Hyperkalemia, in the setting of above and spironolactone use, now resolved  NIRAJ appears primarily hemodynamic precipitated by relative hypotension and tachycardia in the setting of continued diuresis with Cr rise since 2/7. No nephrotoxins evident. UOP remains adequate. UA 2/11 with persistent pyuria, prior from 1/23 was bland. Renal US from 12/2022 showed normal sized kidneys, no obstruction, however of note patient has chronic R lower cortical scarring (cause of which is not readily apparent).  Hyperkalemia resolved with IV diuretics and lokelma use. Cr appears to be improving as well, and patient has excellent UOP.  - Can continue lokelma through today until potassium normalizes, can discontinue thereafter  - Further diuresis per cardiology to optimize volume  - Continue to hold spironolactone for now, can consider resuming once Cr stable  - Strict I/Os  - Daily renal panel  - Low K diet    Nephrology will sign off at this time, please page with questions    Recommendations were communicated to primary team via note    Seen and discussed with Dr. Rhonda Jung MD   Division of Renal Disease and Hypertension  [facility]    Interval History :   Nursing and provider notes from last 24 hours reviewed.  In the last 24 hours Pricilla Brown remained clinically stable. Had excellent UOP with IV bumex and potassium normalized on morning check. Patient denies any new concerns and hopeful about discharge to ARU soon. Happy that kidney function appears to be improving.    Physical Exam:  "  I/O last 3 completed shifts:  In: 1738.75 [P.O.:1460; I.V.:278.75]  Out: 5450 [Urine:5450]   BP (!) 89/59   Pulse 84   Temp 98.6  F (37  C) (Oral)   Resp 18   Ht 1.702 m (5' 7\")   Wt (!) 195 kg (430 lb)   SpO2 93%   BMI 67.35 kg/m       GENERAL APPEARANCE: no distress, awake  EYES: no scleral icterus  Pulmonary: breathing non-labored  CV: regular rhythm, normal rate   - Edema trace pitting  GI: soft, nontender, obese  : phan present  SKIN: BLE with skin peeling  NEURO: face symmetric, AOx3, normal speech  PSYCH: normal affect    Labs:   All labs reviewed by me  Electrolytes/Renal - Recent Labs   Lab Test 02/12/23  1143 02/12/23  1140 02/12/23  0651 02/12/23  0544 02/12/23  0024 02/11/23  2244 02/11/23  1326 02/11/23  1324 02/11/23  0810 02/11/23  0641 02/10/23  0746 02/10/23  0649 01/01/23  0752 01/01/23  0711 12/31/22  1809 12/31/22  1242 12/31/22  0728 12/31/22  0404   *  --   --  130*  --   --   --  129*  --  132*  --  131*   < > 137  --  133*  --  137   POTASSIUM 4.8  --   --  5.2  --  7.2*   < > 6.8*  --  6.1*  --  5.5*   < > 4.2  --  4.3  --  4.4   CHLORIDE 92*  --   --  92*  --   --   --  92*  --  94*  --  92*   < > 94*  --  91*  --  91*   CO2 27  --   --  21*  --   --   --  27  --  27  --  28   < > 32*  --  31*  --  34*   BUN 78.4*  --   --  78.9*  --   --   --  78.4*  --  81.6*  --  69.2*   < > 50.4*  --  50.1*  --  56.1*   CR 1.82*  --   --  1.85*  --   --   --  2.08*  --  2.16*  --  1.96*   < > 1.66*  --  1.53*  --  1.61*   * 270* 112* 135*   < >  --    < > 208*   < > 103*   < > 215*   < > 207*   < > 311*   < > 259*   JUSTIN 9.2  --   --  9.4  --   --   --  9.0  --  8.9  --  9.2   < > 8.7  --  8.6  --  9.0   MAG  --   --   --  2.2  --   --   --   --   --  2.4*  --  2.0   < > 2.1  --  1.9  --  2.0   PHOS  --   --   --   --   --   --   --   --   --   --   --   --   --  3.0  --  2.3*  --  2.6    < > = values in this interval not displayed.       CBC -   Recent Labs   Lab Test " 02/12/23  0346 02/09/23  0659 02/08/23  0708   WBC 7.4 8.4 7.9   HGB 13.2 13.8 14.2    331 314       LFTs -   Recent Labs   Lab Test 02/11/23  1452 01/29/23  0319 01/28/23  0311 01/27/23  0916 01/24/23  0650   ALKPHOS  --  97 103  --  106*   BILITOTAL  --  1.4* 1.6*  --  1.4*   ALT  --  11 11 6* 11   AST  --  37*  --  17 23   PROTTOTAL  --  7.5 7.6  --  6.3*   ALBUMIN 3.1* 2.9* 3.1*  --  2.7*       Iron Panel -   Recent Labs   Lab Test 10/20/21  1315 08/19/20  1626   IRON 60 64   IRONSAT 25 24     Imaging:  All imaging studies reviewed by me.     Current Medications:    apixaban ANTICOAGULANT  5 mg Oral BID     atorvastatin  40 mg Oral Daily     [Held by provider] bumetanide  1 mg Oral Daily     diltiazem ER  120 mg Oral BID     gabapentin  600 mg Oral BID     hydrALAZINE  10 mg Oral TID     insulin aspart  1-22 Units Subcutaneous TID AC     insulin aspart  1-16 Units Subcutaneous At Bedtime     insulin aspart   Subcutaneous TID w/meals     [START ON 2/13/2023] insulin glargine  75 Units Subcutaneous Daily     levothyroxine  200 mcg Oral Once per day on Mon Tue Wed Thu Fri Sat     levothyroxine  400 mcg Oral Weekly     metoprolol succinate ER  50 mg Oral Daily     miconazole   Topical BID     nicotine  1 patch Transdermal Daily    And     nicotine   Transdermal Q8H OPHELIA     polyethylene glycol  17 g Oral Daily     sennosides  8.6 mg Oral BID     sodium chloride (PF)  10-40 mL Intracatheter Q8H     sodium zirconium cyclosilicate  10 g Oral TID     [Held by provider] spironolactone  50 mg Oral Daily     umeclidinium  1 puff Inhalation Daily       - MEDICATION INSTRUCTIONS -       ACE/ARB/ARNI NOT PRESCRIBED       Saad Jung MD

## 2023-02-12 NOTE — PLAN OF CARE
Changes this shift: Pt very lethargic today, falling asleep during conversation. VSS on RA. Hyperkalemia protocol intitiated this afternoon for K+ of 6.8. Recheck K+ was 5.7. Pt was given a total of 6g IVP Bumex today but was unable to void. Keenan was reinserted by writer with assist, pt was pre-medicated w/ a 1x order of oxy. Pt was tearful but agreeable. Stool incontinence x1. Did not get OOB.    Hours of care: [id number]

## 2023-02-12 NOTE — PLAN OF CARE
Dx: admitted 1/23 for HF exacerbation    Neuro: A&O x4. Pt slept through shift  Cardiac: Afib HR control. BP normal.   Respiratory: LS dim at bases. On RA.   GI/: No BM this shift. Voiding via phan catheter.   Diet: Mod cho, 2g K/Na.   Skin: see flowsheet.   Pain: Denied.   LDAs: 2 L PIVs  Electrolytes: await AM labs; Hyperkalemic protocol initiate.   Mobility: 2-3 AO1 lift  Social:  Await AM labs    Plan: per C1      Goal Outcome Evaluation:      Plan of Care Reviewed With: patient    Overall Patient Progress: no changeOverall Patient Progress: no change

## 2023-02-13 NOTE — PROGRESS NOTES
Shift [id number]   ?  A/I : Monitored vitals and assessed pt status. A0x4. Vitals stable on RA. Afib, rates controlled. Afebrile. Urinating adequately via phan. Small BM this morning. Good appetite on 2 g Na and moderate card consistent diet, patient snacking between meals, reminded to notify nursing when eating, so can cover carbs appropriately. Denies chest pain, palpitations, shortness of breath, nausea, dizziness. Generalized pain in arms, back, leg and foot managed with PRN medications. No new skin concerns observed. Up with lift assistance, working with OT/PT on mobility and ambulation. IV access: LPIV x2 SL.     Changed/highlights: Lantus dose decreased 75 to 70 units, Hydralazine not given at scheduled 1400 per parameters, Systolic 95.   Running:   PRN: Perocetx1, Robaxinx1 and tylenolx1 to help manage pain  ?  P: Continue to monitor Pt status and report changes to Cards 1. Discharge to ARU pending bed availability, no availability on Tuesday 2/14 per SW.

## 2023-02-13 NOTE — PROGRESS NOTES
[facility]  Diabetes Consult Daily Progress Note           Assessment and Plan:   Assessment:  Principal Problem:    Acute HFrEF (heart failure with reduced ejection fraction) (H)  Graves s/p ROGER now w/ postablative hypothyroidism  Diabetes Mellitus type 2    Plan:  - Diabetes Mellitus   - Continue to hold Ozempic 1mg daily due to  NIRAJ   -   Lantus 70u daily   -  Continue Aspart 1:3u with breakfast and lunch and decrease to 1:4 for dinner.  Continue 1:4 for snacks   -  Aspart very high slide 1:10            - Hypoglycemia protocol            - diabetes education needs will be assessed closer to discharge            - Outpatient follow up: Thelma Archibald/Dr Silva [facility]    _awaiting for ARU bed             Last got dextrose and regular insulin at 00:00 for elevated K on 2/12.  Diuresis held and creat down to 1.7  Continue same lantus dose -->   this am  When she eats breakfast or lunch bg are high and she got low after correction for dinner so increased carb coverage before dinner to 1:4             Significant Problems:     Past Medical History:   Diagnosis Date     A-fib (H) 2011    on coumadin since 1/13     Asthma     as a kid     Chest pain 2/1/2017     Chronic anticoagulation for a-fib 2/15/2013    INR's followed by coumadin clinic at      Diabetes mellitus (H) 2012     Diastolic heart failure 2/15/2013     Dilated cardiomyopathy (H) 1/8/2013     HTN (hypertension)      Hyperthyroidism     Graves, s/p I131 1/13, now on prednisone and methimazole     Mixed type age-related cataract, both eyes 4/8/2022     Morbid obesity (H)      Pulmonary embolism (H) 1/12    hospitalized in [place], on lovenox/coumadin for a few months but stopped, hypercoag w/u neg per pt     Sleep apnea     using CPAP              Review of Systems:     The Review of Systems is negative other than noted in the HPI            Medications:     Current Facility-Administered Medications   Medication     acetaminophen  (TYLENOL) tablet 650 mg     albuterol (PROVENTIL HFA/VENTOLIN HFA) inhaler     albuterol (PROVENTIL) neb solution 2.5 mg     apixaban ANTICOAGULANT (ELIQUIS) tablet 5 mg     atorvastatin (LIPITOR) tablet 40 mg     benzocaine-menthol (CHLORASEPTIC MAX) 15-10 MG lozenge 1 lozenge     bisacodyl (DULCOLAX) suppository 10 mg     [Held by provider] bumetanide (BUMEX) tablet 1 mg     capsaicin (ZOSTRIX) 0.025 % cream     clotrimazole (LOTRIMIN) 1 % cream     glucose gel 15-30 g    Or     dextrose 50 % injection 25-50 mL    Or     glucagon injection 1 mg     diclofenac (VOLTAREN) 1 % topical gel 2 g     diltiazem ER (CARDIZEM SR) 12 hr capsule 120 mg     gabapentin (NEURONTIN) capsule 600 mg     heparin lock flush 10 UNIT/ML injection 5-20 mL     hydrALAZINE (APRESOLINE) tablet 10 mg     hydrOXYzine (ATARAX) tablet 25 mg    Or     hydrOXYzine (ATARAX) tablet 50 mg     insulin aspart (NovoLOG) injection (RAPID ACTING)     insulin aspart (NovoLOG) injection (RAPID ACTING)     insulin aspart (NovoLOG) injection (RAPID ACTING)     insulin aspart (NovoLOG) injection (RAPID ACTING)     insulin aspart (NovoLOG) injection (RAPID ACTING)     insulin aspart (NovoLOG) injection (RAPID ACTING)     insulin glargine (LANTUS PEN) injection 70 Units     labetalol (NORMODYNE/TRANDATE) injection 10 mg     levothyroxine (SYNTHROID/LEVOTHROID) tablet 200 mcg     levothyroxine (SYNTHROID/LEVOTHROID) tablet 400 mcg     magic mouthwash suspension (diphenhydramine, lidocaine, aluminum-magnesium & simethicone)     methocarbamol (ROBAXIN) tablet 500 mg     metoprolol succinate ER (TOPROL XL) 24 hr tablet 50 mg     miconazole (MICATIN) 2 % cream     naloxone (NARCAN) injection 0.2 mg    Or     naloxone (NARCAN) injection 0.4 mg    Or     naloxone (NARCAN) injection 0.2 mg    Or     naloxone (NARCAN) injection 0.4 mg     nicotine (NICODERM CQ) 14 MG/24HR 24 hr patch 1 patch    And     nicotine Patch in Place     ondansetron (ZOFRAN ODT) ODT tab 4 mg  "    oxyCODONE-acetaminophen (PERCOCET) 5-325 MG per tablet 1 tablet     Patient is already receiving anticoagulation with heparin, enoxaparin (LOVENOX), warfarin (COUMADIN)  or other anticoagulant medication     polyethylene glycol (MIRALAX) Packet 17 g     Reason ACE/ARB/ARNI order not selected     sennosides (SENOKOT) tablet 8.6 mg     simethicone (MYLICON) chewable tablet 80 mg     sodium chloride (PF) 0.9% PF flush 10-20 mL     sodium chloride (PF) 0.9% PF flush 10-40 mL     [Held by provider] spironolactone (ALDACTONE) tablet 50 mg     umeclidinium (INCRUSE ELLIPTA) 62.5 MCG/ACT inhaler 1 puff     Facility-Administered Medications Ordered in Other Encounters   Medication     sodium chloride (PF) 0.9% PF flush 10 mL            Physical Exam:   Blood pressure 106/66, pulse 95, temperature 98  F (36.7  C), temperature source Oral, resp. rate 18, height 1.702 m (5' 7\"), weight (!) 195 kg (430 lb), SpO2 97 %, not currently breastfeeding.      Exam:  Constitutional:  alert, no acute distress, laying in bed  ENT: visible goiter  Respiratory: nonlabored  Gastrointestinal: Abdomen soft, non-tender.  Musculoskeletal: extremities normal- no gross deformities noted,   Skin: no suspicious lesions or rashes-- She does have some scaling of her skin and peeling of her feet  RIght food/toe with black tissue-- seen by [facility]- see podiatrist as outpatient--asked her when she gets out to make an appointment  Psychiatric: mentation appears normal, calm              Data:   All laboratory data reviewed     ROUTINE IP LABS (Last four results)  BMP  Recent Labs   Lab 02/13/23  0723 02/13/23  0647 02/12/23  2349 02/12/23  2150 02/12/23  1558 02/12/23  1143 02/12/23  0651 02/12/23  0544 02/12/23  0024 02/11/23  2244 02/11/23  1326 02/11/23  1324   NA  --  134*  --   --   --  130*  --  130*  --   --   --  129*   POTASSIUM  --  4.7  --   --   --  4.8  --  5.2  --  7.2*   < > 6.8*   CHLORIDE  --  93*  --   --   --  92*  --  92*  --   --   --  " 92*   JUSTIN  --  9.1  --   --   --  9.2  --  9.4  --   --   --  9.0   CO2  --  30*  --   --   --  27  --  21*  --   --   --  27   BUN  --  79.4*  --   --   --  78.4*  --  78.9*  --   --   --  78.4*   CR  --  1.68*  --   --   --  1.82*  --  1.85*  --   --   --  2.08*   * 151* 114* 109*   < > 328*   < > 135*   < >  --    < > 208*    < > = values in this interval not displayed.     CBC  Recent Labs   Lab 02/12/23  0346 02/09/23  0659 02/08/23  0708 02/07/23  0622   WBC 7.4 8.4 7.9 7.1   RBC 4.49 4.66 4.76 4.81   HGB 13.2 13.8 14.2 13.9   HCT 42.8 44.5 45.9 46.2   MCV 95 96 96 96   MCH 29.4 29.6 29.8 28.9   MCHC 30.8* 31.0* 30.9* 30.1*   RDW 16.6* 16.4* 16.1* 16.1*    331 314 309     INRNo lab results found in last 7 days.      Jamilah Prater PA-C  Inpatient Diabetes Service  Pager   [phone]  2/13/2022    Contacting the Inpatient Diabetes Team   From 8AM-4PM: page inpatient diabetes provider that is following the patient, or utilize the job code paging system.   From 4PM-8AM: page the job code for endocrine fellow on call.        Please use the following job code to reach the Inpatient Diabetes team. Note that you must use an in house phone and that job codes cannot receive text pages.     Dial [phone] (or star-star-star [phone] on the new [employer] telephones), then [phone] to reach the endocrine-diabetes provider on call.      Review of prior external note(s) from - [facility] or Care Everywhere   35 minutes spent on the date of the encounter doing chart review, history and exam, documentation and further activities per the note     Over 50% of my time on the unit was spent counseling the patient and/or coordinating care regarding acute hyperglycemia management.  See note for details.

## 2023-02-13 NOTE — PLAN OF CARE
Goal Outcome Evaluation:         Neuro: A&Ox4.   Cardiac: Afebrile, VSS. A-fib controlled          Respiratory: RA, lung sounds clear, no shortness of breath or dyspnea noted.   GI/: Keenan in place with adequate output  Diet/appetite: Tolerating consistent carb 2g K 2g Na diet, appetite is good. Denies nausea. BG checks ACHS + carb coverage  Activity: Q2 turns  Pain: C/O lower extremity pain controlled with PRN medications  Skin: No new deficits noted.  Lines: 2x L PIV, SL  Drains: Keenan, WDL  Replacements: none given     Plan:. Continue with current POC. Report changes to primary team.

## 2023-02-13 NOTE — PROGRESS NOTES
Care Management Follow Up    Length of Stay (days): 21    Expected Discharge Date: 02/12/2023     Concerns to be Addressed: discharge planning     Patient plan of care discussed at interdisciplinary rounds: Yes    Anticipated Discharge Disposition: ARU     Anticipated Discharge Services:  ARU therapy services  Anticipated Discharge DME:  n/a    Patient/family educated on Medicare website which has current facility and service quality ratings:  yes  Education Provided on the Discharge Plan:  yes  Patient/Family in Agreement with the Plan:  yes    Referrals Placed by CM/MARGO:  Pt is being followed by  Rehab for ARU.    [facility]  Address: [address] [place] [id number]  - 5th floor of the building   - Phone: [phone]  - RN to RN #: [phone]  - 2/13: Over this weekend, the pt was hyperkalemic, had a rising potassium, and received IV dextrose/insulin. Per the Cards 1 provider, pt is now med ready and can discharge to ARU. MARGO spoke with Celeste in rehab admissions and requested she re-review the pt as the pt is newly med ready again. MARGO is currently awaiting a response from Celeste about an open ARU bed.     Per Celeste in rehab admissions, since the pt received IV dextrose last night, she needs to be 24hrs stable off any IV meds before discharge to ARU. Celeste told the MARGO she'd update the SW after her 2pm huddle about any open beds.     Per Celeste, there are no open beds tomorrow Tuesday 2/14 so MARGO to contact rehab admissions tomorrow to see Wednesday's beds.     Private pay costs discussed: Not applicable    Additional Information:  MARGO is following for discharge planning.    See above in the referrals section for updates.    ___________________    DANIEL Villagomez, STERLING  6C   [facility]  Phone: [phone]  Pager: [phone]

## 2023-02-13 NOTE — PROGRESS NOTES
Cardiology Progress Note      Assessment & Plan:  Pricilla Brown is a 48 year old female with a history of HFmrEF (EF 40-45%) 2/2 NICM, long-standing persistent AF, HTN, type II DM, morbid obesity, asthma, JYOTI and Graves disease s/p radioiodine ablation c/b hypothyroid admitted 1/23/2023 for heart failure exacerbation with hospital course complicated by CO2 retention and being obtunded, transferred to ICU on 1/28/2023 for encephalopathy suspected secondary to hypercapnia in setting of aggressive diuresis and contraction alkalosis despite using diamox. Initially transferred for intubation, but patient remained on BiPAP throughout ICU admission. Improved 1/29 after holding diuretics. Currently net negative -64L and ~130lbs since admission, tolerating PO diuretics, pending ARU placement.    Today's Update:  - Continue to hold diuretic and simeon  - Medically ready for ARU    # Acute on Chronic Systolic Heart Failure (EF 40-45%)  # Long-Standing Persistent Atrial Fibrllation  # Hypertension   # Troponin Elevation 2/2 CHF, resolved     Pt admitted with c/o increasing SOB, MURRAY, chest tightness, weight gain in spite of compliance with PO diuretics. NT BNP 5891 (was 2030 during most recent admission).  Lactic acid 2.2 on admission with improvement to 1.1 with diuresis. Troponin mildly elevated, 67-->75-->60 likely 2/2 CHF exacerbation; no concern for ACS currently. Most recent Echo with EF 40-45%. Coronary angiogram 2012 with no significant CAD. Of note, pt recently admitted 12/25-1/1 HF exacerbation, diuresed to 472lbs. Pt weight on admission 1/23 555lbs.   - Volume Status: Euvolemic  - Diuresis: continue to hold PO Bumex 1 mg daily              - New EDW is 430 lbs  - Beta Blocker: Continue Toprol XL to 50mg  - ACEi/ARB/ARNI: contraindicated d/t lisinopril allergy.   - SGLT2i: Contraindicated with recurrent yeast infections   - Aldosterone Antagonist: Hold/discontinue PTA Spironolactone 50mg d/t hyperkalemia  -  Rate Control: BB as above. Cont diltiazem 120 BID due to persistent tachycardia and borderline reduced EF.   - Afterload reduction: Cont hydralazine 10mg PO TID (hold parameters SBP <110)  - Anticoagulation: CHADSVASC 3; Continue PTA apixaban  - Strict I/O & Daily Weights  - Low Na Diet / 2L Fluid Restriction  - Daily BMPs  - CORE consulted; recommend CardioMEMS discussion at this appointment ([facility] NP aware).     # Acute on chronic Kidney Disease Stage 3b  Longstanding history of diabetes and HTN. Baseline Creatinine appears to be around 1.2-1.4. Creatinine on admission 1.56.   - Labs today pending at time of note  - Hold spironolactone and bumex  - Avoid nephrotoxic agents     # Hyperkalemia  - Potassium continually rising since 2/9 with peak 2/11 @ 7.3  - EKG without acute changes, rhythm stable in a fib  - Potassium shifted with insulin, calcium gluconate for cardiac stability, lokelma 15 g X 1, IV Bumex total of 6 mg on 2/11/23  - Nephrology consulted, appreciate recs  - Holding spironolactone     # Graves Disease s/p Radioiodine Ablation  # Hypothyroidism 2/2 Radioiodine Ablation  TSH 20.4 and free T4 0.82 on admission.   - Endocrine consulted; recommendations below   - Continue levothyroxine 200 mcg M-Sat- 400 mcg on Sunday.    - Recheck TFT in a month (March 2023)     # Type 2 Diabetes Mellitus  # Morbid obesity  A1c 13.5% on 12/25/2022. Discrepancies in patient's home regimen. Endocrinology consulted during admission, signed off, and re-consulted 2/1/23 -- BGs now range 180-280s  - Appreciate ongoing endo recommendations  - Insulin gtt started 2/1 per endocrinology, transitioned back to subcutaneous lantus and novolog on 2/3/23.   - Lantus increased per endocrinology to 70 units daily per Endocrine   - Continue Novolog mealtime insulin  1:4g CHO(while inpatient)  - Continue high intensity SSI   - POCT glucose checks QID   - Hypoglycemia protocol  - Ok to resume PTA Ozempic at discharge (not  covered on formulary inpatient)  - Discharge recommendations per endocrinology; Toujeo 40 at bedtime, aspart 10u w/ each meal + additional coverage based on pre-meal glucose (140-160) + 1 unit, (161-180) + 2 units, (181-200) + 3 units, and resume Ozempic 1mg weekly.     # Right Toe Wound  Present on admission, right great toe and 3rd toe, dry gangrene, pt unaware how long wounds have been present.   - WOCN following: wound cares to right toe daily. Paint toes with betadine. Be sure to apply over dry eschar. Ok to leave open to air. No soaking feet.  - Vascular consulted; JOSETTE's completed showing patent vessels and normal waveforms. They recommend orthopedic foot and ankle consult   - Ortho consulted; plan for outpatient follow up to discuss elective amputation on 2/17/23 at 10:25 with Dr. Oscar Leahy.  - Podiatry consult 2/8 per endo recs  - Continue with LE elevation, able to ambulate w/ PT/OT     #Acute simple cystitis, resolved  Positive UA 2/2/23 and urine dicoloration.   -Macrobid 100mg BID x5 days through 2/7, course completed     # Asthma  - Continue PTA inhalers (levalbuterol and spiriva)  - Magic mouthwash ordered       # JYOTI  - Continue BiPAP at night    # Hyperlipidemia   - Continue lipitor 40 mg daily     # Pain  Patient reports pain is improved. Has been using narcotics (percocet) along with shae, topical nsaids, and muscle relaxants. Aggressive diuresis vs diabetic neuropathy.  - Pain management curbside consulted 1/31; decrease gabapentin to BID, stop flexeril, add Robaxin 400 q6hr PRN  - PRN Lidocaine patches     FEN: 2 gm Sodium /low potassium/ 2L Fluid restriction  Code status: Full  Prophylaxis: PO Eliquis  Isolation: None  Disposition: Pending ARU placement     Patient discussed w/ Dr. Pineda, who agrees with above plan.    YOCASTA Hancock, CNP  [facility]  Cards [address]  Ascom [id number]    Medical Decision Making       30 MINUTES SPENT BY ME on the date of service doing  "chart review, history, exam, documentation & further activities per the note.      Interval History:  NAEO, labs and vitals reviewed  Patient feels well today  Will touch base with SW regarding placement  Hypoglycemic overnight, appreciate endo assistance  Patient requesting more education regarding low potassium diet, discussed with RD who will see today.     Most recent vital signs:  BP 94/69 (BP Location: Right arm, Cuff Size: Adult Regular)   Pulse 83   Temp 98  F (36.7  C) (Oral)   Resp 18   Ht 1.702 m (5' 7\")   Wt (!) 195 kg (430 lb)   SpO2 100%   BMI 67.35 kg/m    Temp:  [97.6  F (36.4  C)-98.6  F (37  C)] 98  F (36.7  C)  Pulse:  [63-92] 83  Resp:  [17-18] 18  BP: ()/(38-89) 94/69  SpO2:  [93 %-100 %] 100 %  Wt Readings from Last 2 Encounters:   02/10/23 (!) 195 kg (430 lb)   01/01/23 (!) 214.4 kg (472 lb 9.6 oz)       Intake/Output Summary (Last 24 hours) at 2/13/2023 0654  Last data filed at 2/13/2023 0400  Gross per 24 hour   Intake 1540 ml   Output 2150 ml   Net -610 ml       Physical exam:  General: Pleasant elderly female. Appears comfortable and in no acute distress. Alert and interactive  HEENT: Normocephalic, atraumatic. No scleral icterus or injection  Neck: JVP difficult assessment due to body habitus  CARDIAC: Irregularly irregular rate and rhythm, no m/r/g appreciated. Peripheral pulses 2+  RESP: Normal work of breathing on room air without use of accessory breathing muscles. Clear to auscultation in all fields. No wheezes, rhonchi or crackles appreciated.  GI: No abdominal distention. Soft and nontender.   EXTREMITIES: 1+ lower extremity edema. No cyanosis or clubbing. Warm and well perfused. No venous stasis changes.   SKIN: R great toe & 4th toe cyanotic and peeling. Warm and dry to touch  NEURO: Alert and oriented X3, CN II-XII grossly intact, no focal neurological deficits noted, normal speech  PSYCH: Mood and affect are appropriate    Labs (Past three days):  CBC  Recent Labs "   Lab 02/12/23  0346 02/09/23  0659 02/08/23  0708 02/07/23  0622   WBC 7.4 8.4 7.9 7.1   RBC 4.49 4.66 4.76 4.81   HGB 13.2 13.8 14.2 13.9   HCT 42.8 44.5 45.9 46.2   MCV 95 96 96 96   MCH 29.4 29.6 29.8 28.9   MCHC 30.8* 31.0* 30.9* 30.1*   RDW 16.6* 16.4* 16.1* 16.1*    331 314 309     BMP  Recent Labs   Lab 02/12/23  2349 02/12/23  2150 02/12/23  2031 02/12/23 2010 02/12/23  1558 02/12/23  1143 02/12/23  0651 02/12/23  0544 02/12/23  0024 02/11/23  2244 02/11/23  2125 02/11/23  1912 02/11/23  1326 02/11/23  1324 02/11/23  0810 02/11/23  0641 02/10/23  0746 02/10/23  0649 02/09/23  0723 02/09/23  0659   NA  --   --   --   --   --  130*  --  130*  --   --   --   --   --  129*  --  132*  --  131*  --  131*   POTASSIUM  --   --   --   --   --  4.8  --  5.2  --  7.2*  --  5.7*   < > 6.8*  --  6.1*  --  5.5*  --  5.2   CHLORIDE  --   --   --   --   --  92*  --  92*  --   --   --   --   --  92*  --  94*  --  92*  --  91*   CO2  --   --   --   --   --  27  --  21*  --   --   --   --   --  27  --  27  --  28  --  29   ANIONGAP  --   --   --   --   --  11  --  17*  --   --   --   --   --  10  --  11  --  11  --  11   * 109* 131* 63*   < > 328*   < > 135*   < >  --    < >  --    < > 208*   < > 103*   < > 215*   < > 301*   BUN  --   --   --   --   --  78.4*  --  78.9*  --   --   --   --   --  78.4*  --  81.6*  --  69.2*  --  63.8*   CR  --   --   --   --   --  1.82*  --  1.85*  --   --   --   --   --  2.08*  --  2.16*  --  1.96*  --  1.75*   GFRESTIMATED  --   --   --   --   --  33*  --  33*  --   --   --   --   --  29*  --  27*  --  31*  --  35*   JUSTIN  --   --   --   --   --  9.2  --  9.4  --   --   --   --   --  9.0  --  8.9  --  9.2  --  9.2   MAG  --   --   --   --   --   --   --  2.2  --   --   --   --   --   --   --  2.4*  --  2.0  --  2.0    < > = values in this interval not displayed.     Troponins:   Lab Results   Component Value Date    TROPONIN 0.01 01/05/2013        LESTERNo lab results found in last  7 days.  Liver panel  Recent Labs   Lab 02/11/23  1452   ALBUMIN 3.1*     EKG 2/1/23, normal potassium:     EKG 2/11/2023, hyperkalemia:  1/24/23 Echo:  Technically difficult study.Extremely difficult acoustic windows despite the  use of contrast for endcardial border definition.  Left ventricular function is decreased. The ejection fraction is 40-45%  (mildly reduced).  Right ventricular function cannot be assessed due to poor image quality.  IVC diameter >2.1 cm collapsing <50% with sniff suggests a high RA pressure  estimated at 15 mmHg or greater.     This study was compared with the study from 6/14/2022. No significant changes  Noted.     Echocardiogram 2/11/23:  Interpretation Summary  Left ventricular function is decreased. The ejection fraction is 30-35%  (moderately reduced).  Global right ventricular function is mildly to moderately reduced.  The estimated mean right atrial pressure is elevated ~15 mmHg.  No pericardial effusion is present.    1/27/23 Right JOSETTE:                                                                  IMPRESSION: Negative study. Triphasic waveforms and adequate  velocities in the right posterior tibial, anterior tibial, and  dorsalis pedis arteries.     No results found for this or any previous visit (from the past 24 hour(s)).    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 Initially transferred for intubation, but patient remained on BiPAP  throughout ICU admission. Improved 1/29 after holding diuretics. Currently net negative -64L and ~130lbs since admission, tolerating PO diuretics, pending ARU placement.    Today's Update:  - Continue to hold diuretic and simeon  - Medically ready for ARU    # Acute on Chronic Systolic Heart Failure (EF 40-45%)  # Long-Standing Persistent Atrial Fibrllation  # Hypertension   # Troponin Elevation 2/2 CHF, resolved        - Volume Status: Euvolemic  - Diuresis: continue to hold PO Bumex 1 mg daily              - New EDW is 430 lbs  - Beta Blocker: Continue Toprol XL to 50mg  - ACEi/ARB/ARNI: contraindicated d/t lisinopril allergy.   - SGLT2i: Contraindicated with recurrent yeast infections   - Aldosterone Antagonist: Hold/discontinue PTA Spironolactone 50mg d/t hyperkalemia  - Rate Control: BB as above. Cont diltiazem 120 BID due to persistent tachycardia and borderline reduced EF.   - Afterload reduction: Cont hydralazine 10mg PO TID (hold parameters SBP <110)  - Anticoagulation: CHADSVASC 3; Continue PTA apixaban  - Strict I/O & Daily Weights  - Low Na Diet / 2L Fluid Restriction  - Daily BMPs  - CORE consulted; recommend CardioMEMS discussion at this appointment ([facility] NP aware).     # Acute on chronic Kidney Disease Stage 3b  Longstanding history of diabetes and HTN. Baseline Creatinine appears to be around 1.2-1.4. Creatinine on admission 1.56.   - Hold spironolactone and bumex  - Avoid nephrotoxic agents     # Hyperkalemia  - Potassium continually rising since 2/9 with peak 2/11 @ 7.3  - EKG without acute changes, rhythm stable in a fib  - Potassium shifted with insulin, calcium gluconate for cardiac stability, lokelma 15 g X 1, IV Bumex total of 6 mg on 2/11/23  - Nephrology consulted, appreciate recs       # Graves Disease s/p Radioiodine Ablation  # Hypothyroidism 2/2 Radioiodine Ablation  TSH 20.4 and free T4 0.82 on admission.   - Endocrine consulted; recommendations below   - Continue  "levothyroxine 200 mcg M-Sat- 400 mcg on Sunday.    - Recheck TFT in a month (March 2023)     # Type 2 Diabetes Mellitus  # Morbid obesity  A1c 13.5% on 12/25/2022. Discrepancies in patient's home regimen. Endocrinology consulted during admission, signed off, and re-consulted 2/1/23 -- BGs now range 180-280s  - Appreciate ongoing endo recommendations.     # Right Toe Wound  Present on admission, right great toe and 3rd toe, dry gangrene, pt unaware how long wounds have been present.   - WOCN following: wound cares to right toe daily. P- Vascular consulted; JOSETTE's completed showing patent vessels and normal waveforms. They recommend orthopedic foot and ankle consult   - Ortho consulted; plan for outpatient follow up to discuss elective amputation on 2/17/23 at 10:25 with Dr. Oscar Leahy.  - Podiatry consult 2/8 per endo recs  - Continue with LE elevation, able to ambulate w/ PT/OT     #Acute simple cystitis, resolved  Positive UA 2/2/23 and urine dicoloration.   -Macrobid 100mg BID x5 days through 2/7, course completed     # Asthma  - Continue PTA inhalers (levalbuterol and spiriva)  - Magic mouthwash ordered       # JYOTI  - Continue BiPAP at night    # Hyperlipidemia   - Continue lipitor 40 mg daily     # Pain  Patient reports pain is improved. Has been using narcotics (percocet) along with shae, topical nsaids, and muscle relaxants. Aggressive diuresis vs diabetic neuropathy.      Interval History:  NAEO, labs and vitals reviewed  Patient feels well today  Will touch base with SW regarding placement  Hypoglycemic overnight, appreciate endo assistance  Patient requesting more education regarding low potassium diet, discussed with RD who will see today.     Most recent vital signs:  BP 94/69 (BP Location: Right arm, Cuff Size: Adult Regular)   Pulse 83   Temp 98  F (36.7  C) (Oral)   Resp 18   Ht 1.702 m (5' 7\")   Wt (!) 195 kg (430 lb)   SpO2 100%   BMI 67.35 kg/m    Temp:  [97.6  F (36.4  C)-98.6  F (37  C)] " 98  F (36.7  C)  Pulse:  [63-92] 83  Resp:  [17-18] 18  BP: ()/(38-89) 94/69  SpO2:  [93 %-100 %] 100 %  Wt Readings from Last 2 Encounters:   02/10/23 (!) 195 kg (430 lb)   01/01/23 (!) 214.4 kg (472 lb 9.6 oz)       Intake/Output Summary (Last 24 hours) at 2/13/2023 0654  Last data filed at 2/13/2023 0400  Gross per 24 hour   Intake 1540 ml   Output 2150 ml   Net -610 ml       Physical exam:  General: Pleasant elderly female. Appears comfortable and in no acute distress. Alert and interactive  Neck: JVP difficult assessment due to body habitus  CARDIAC: Irregularly irregular rate and rhythm, no m/r/g appreciated. Peripheral pulses 2+  RESP: Normal work of breathing on room air without use of accessory breathing muscles. Clear to auscultation in all fields. No wheezes, rhonchi or crackles appreciated.   EXTREMITIES: 1+ lower extremity edema. No cyanosis or clubbing. Warm and well perfused. No venous stasis changes.   SKIN: R great toe & 4th toe cyanotic and peeling. Warm and dry to touch     I   EKG 2/11/2023, hyperkalemia     Echocardiography reviewed with patient and NP    I saw and evaluated patient with NP. I examined patient with NP. I discussed the results with patient and NP. I discussed our plan with patient and CV resident.  I agree with NP I edited the NP's note to make it a more comprehensive document.    30 min were spent directly with patient and NP.    Pedro Pineda MD, PhD  Professor of Medicine  Division of Cardiology

## 2023-02-14 NOTE — PLAN OF CARE
Occupational Therapy Discharge Summary    Reason for therapy discharge:    Discharged to acute rehabilitation facility.    Progress towards therapy goal(s). See goals on Care Plan in [facility] electronic health record for goal details.  Goals partially met.  Barriers to achieving goals:   discharge from facility.    Therapy recommendation(s):    Continued therapy is recommended.  Rationale/Recommendations:  pt would benefit from continued therapy in ARU setting to continue to progress strength, IND with I/ADLs, and act eva.

## 2023-02-14 NOTE — PLAN OF CARE
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 Hydralazine held at 2000. afebrile  Cardiac:  HR: 70s-80s a-fib   Neuro: AOx4  BG: ACHS. Carb coverage given for snack in middle of the night.  Respiratory: Dyspnea on exertion, lung sounds are diminished throughout.  Pain/Nausea/PRN: Pt denies nausea. PRN percocet & tylenol administered x1 for generalized pain.  Diet: 2 gram sodium diet, 2 K with 2 L fluid restriction  LDA: 2 L PIV - SL  GI/: Keenan in place, good UO  Skin: No new findings. R hip dressing to be changed today. Feet SAMEER  Mobility: Lift assist. PT is the only ones to ambulate.  Plan: Pt is medically ready for discharge and is awaiting ARU at this time.    Goal Outcome Evaluation:      Plan of Care Reviewed With: patient    Overall Patient Progress: no changeOverall Patient Progress: no change

## 2023-02-14 NOTE — PROGRESS NOTES
CLINICAL NUTRITION SERVICES    Reason for Assessment:  Lowering potassium intake education, received request from provider on 2/13     Diet History:  Pt reports no history of receiving any potassium education or reading food labels in the past.    Nutrition Diagnosis:  Food- and nutrition-related knowledge deficit r/t no previous knowledge of lowering potassium intake AEB pt report of no previous formal heart-healthy nutrition education.    Nutrition Prescription/Recs:  Continue lowering potassium intake diet    Interventions:  Nutrition Education  1. Provided verbal instruction on a lowering potassium intake diet. Pt was very pleasant while discussing the two handouts provided. She stated in the past she did not pay attention to food labels in the past but knows she has to now. Pt was able to point out serving sizes and amount in a container. Pt was also able to look and see what foods were either high or low in potassium. The generalized range of 1990-1775 mg of potassium per day was discussed and explained this can be spread throughout the day.  2. Provided handouts: Controlling Your Potassium Intake and How to Read Food Labels    Goals:    1. Pt will verbalize at least four foods high and/or low in potassium.    2. Pt will list at least two interventions to make current meal plan to contain less potassium.   3. Pt will identify on a food label where to find the serving size, servings per container and amount of potassium.     Follow-up:   Patient to ask any further nutrition-related questions before discharge. In addition, pt may request outpatient RD appointment.    Marin Rothman   Dietetic Intern     I have read and agree with the above nutrition note.      Vera Pelaez, MS, RD, LD, [facility]   6C Pgr: [phone]

## 2023-02-14 NOTE — CONSULTS
Social Work: Initial Assessment with Discharge Plan    Patient Name: Pricilla Brown  : 1975  Age: 48 year old  MRN: [id number]  Completed assessment with: Chart review and interview with patient   Admitted to ARU: 23    Presenting Information   Date of SW assessment: February 15, 2023  Health Care Directive: Health Care Directive Agent (if patient not able to make decisions), open to completing one.  Primary Health Care Agent: Patient/self   Secondary Health Care Agent: JAKOB LEDESMA  Living Situation: Pt lives at home alone in an apartment. Pt states she has 14hrs/day of PCA services provided by her caregiver, Hira Mujica. 4 stairs to enter home. Railings safe and in good condition, railings on both sides of stairs. Tub shower with bench.  Previous Functional Status: Needs assistance with: Bathing, Dressing, Grooming, Ambulation-walker, Ambulation-cane, Cooking, Laundry, Shopping, Meal Preparation, Transportation, Cleaning.   DME available: Shower bench. cane, straight. walker, rolling. CPAP.  Patient and family understanding of hospitalization: Appropriate and pleasant.  Cultural/Language/Spiritual Considerations: 48 year old  female, single, English speaking, with no Worship beliefs reported.      Physical Health  Reason for admission: Pricilla Brown is a 47 year old female with a history of standing persistent AF, HTN, type II DM, morbid obesity, asthma, JYOTI and Graves disease s/p radioiodine ablation c/b hypothyroid admitted 2023 for heart failure exacerbation with hospital course complicated by CO2 retention and being obtunded, transferred to ICU on 2023 for encephalopathy suspected secondary to hypercapnia in setting of aggressive diuresis and contraction alkalosis despite using diamox. Initially transferred for intubation, but patient remained on BiPAP throughout ICU admission. Improved  after holding diuretics. Currently net negative -64L and ~130lbs since admission,  tolerating PO diuretics.  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 No plan for orthopaedic surgery intervention during this admission.     Provider Information   Primary Care Physician:Mario Lockhart [facility] will schedule PCP apt at discharge.   : [facility] - [facility] ([phone])    Mental Health/Chemical Dependency:   Diagnosis: Denies hx of depression or anxiety.   Alcohol/Tobacco/Narcotis: Reports 5-6 cigarettes a day when back home. Denies alcohol use.  Support/Services in Place: Pt denies.  Services Needed/Recommended: [facility] support while on ARU available.   Sexuality/Intimacy: Not discussed     Support System  Marital Status: Single.  Family support: Reports her brother is able to provide occasional support - he often stops by her home.  Other support available: PCA.    Community Resources  Current in home services: 10hrs of PCA care/day, 4hrs of nightly supervision/day, 4hrs of homemaking/week, and 4hrs of IHS per week.  Previous services: None reported.    Financial/Employment/Education  Employment Status: Not working.  Income Source: S.S.I  Education: Not discussed.  Financial Concerns: Pt denies.  Insurance: Medicare/Medicaid.     Discharge Plan   Patient and family discharge goal: TBD, pending progress  Provided Education on discharge plan: Evaluations and discharge recommendations pending.   Patient agreeable to discharge plan:  Pending further discussion. Evaluations and discharge recommendations pending.   Provided education and attained signature for Medicare IM and IRF Patient Rights and Privacy Information provided to patient : YES  Provided patient with [facility] Resources: N/A  Barriers to discharge: TBD.    Discharge Recommendations   Disposition: See above   Transportation Needs: Patient, family/friends, paid transport,  "insurance transport (if applicable)     Additional comments   Discharge TBD, ENZO TBD.    PCA-  Hira Mujica  Phone: [phone]     CAD[facility] -  Flores Camejo  Phone: [phone]    SW will remain available and continue to follow as needs arise.       -------------------------------------------------------------------------------------------------------------  PHILLIP Pain Assessment    Pain Effect on Sleep  Over the past 5 days, how much of the time has pain made it hard for you to sleep at night?\"    2. Occasionally  0 - Does not apply - I have not had any pain or hurting in the past 5 days  1 - Rarely or nor at all  2 - Occasionally  3 - Frequently   4 - Almost Constantly  8 - Unable to Answer    Pain Interference with Therapy Activities  \"Over the past 5 days, how often have you limited your participation in rehabilitation therapy sessions due to pain?\"  1. Rarely or not at all  0 - Does not apply - I have not received rehabilitation therapy in the past 5 days  1 - Rarely or nor at all  2 - Occasionally  3 - Frequently   4 - Almost Constantly  8 - Unable to Answer    Pain Interference with Day-to-Day Activities  \"Over the past 5 days, how often have you limited your day-to-day activities (excluding rehabilitation therapy sessions) because of pain?\"  1. Rarely or not at all  1 - Rarely or nor at all  2 - Occasionally  3 - Frequently   4 - Almost Constantly  8 - Unable to Answer  -------------------------------------------------------------------------------------------------------------    MARTHA Rashid  [facility], Acute Inpatient Rehab Unit   [facility][note 3][address] [place] [address]  Phone: [phone], Fax: [phone], Pager: [phone]          "

## 2023-02-14 NOTE — PROGRESS NOTES
DISCHARGE   Discharged to:  ARU  Via: stretcher  Accompanied by: EMS staff  Discharge Instructions: diet, activity, medications, follow up appointments, when to call the MD, and what to watchout for (i.e. s/s of infection, increasing SOB, palpitations, chest pain,)  Prescriptions: medication list reviewed & sent with pt  Follow Up Appointments: arranged; information given  Belongings: All sent with pt  IV: out  Telemetry: off  Pt exhibits understanding of above discharge instructions; all questions answered.  Discharge Paperwork: faxed    RN to RN report completed

## 2023-02-14 NOTE — H&P
[facility]   Acute Rehabilitation Unit  Admission History and Physical    CHIEF COMPLAINT   09 Cardiac -  heart failure exacerbation     HISTORY OF PRESENT ILLNESS  Pricilla Brown is a 48 year old female with past medical history of chronic HFrEF 2/2 NICM, persistent atrial fibrillation (on Eliquis), asthma, diabetes mellitus type II, hypertension, obesity, hypothyroidism 2/2 radioablation for Graves disease, JYOTI, CKD, and recent admission for HF exacerbation (12/25/22-1/1/23) who was admitted on 1/23/23 with heart failure exacerbation.      She underwent aggressive diuresis, with approximately 100-pound decrease in weight during her stay.    Patient became progressively lethargic/obtunded and was started on BiPAP during daytime on 1/27.  Patient was transferred to ICU on 1/28 for airway management in setting of encephalopathy with hypercarbia.    Multiple services were consulted during patient's stay including the following: ortho for dry gangrene of digits 1, 3, and 4 of right foot (recommended wound care, outpatient follow up for elective amputation); endocrinology for hyperglycemia (insulin drip 2/1 -> subcutaneous insulin 2/3 with ongoing adjustments); nephrology for NIRAJ on CKD and hyperkalemia (s/p shifting with insulin and IV calcium, diuretic changes, Lokelma).    Hospital course was further complicated by lactic acidosis, tachycardia, pain, UTI (s/p 5-day course of antibiotics thru 2/7), urinary retention, and hyponatremia.    During acute hospitalization, patient was seen and evaluated by PT and OT, who collectively recommended that patient would benefit from ongoing therapies in the acute inpatient rehabilitation setting.      In review of the therapy notes, patient is currently needing SBA for sit to stand from chair but total A with lift from bed to chair.  She needs SBA for rolling.  She is able to complete lower body dressing with max A, upper body dressing  with min A, toileting with Ax2, and bathing with mod A.She has participated in some pre-gait activities, and was able to ambulate 3-6' with SBA with seated rest breaks between.    Upon arrival to the rehab unit, no distress.  She is motivated to start therapy program.  Denies chest pain, shortness of breath, no fever or chills.  Feels has poor endurance but states she is up walking and hopes to improve this.     PAST MEDICAL HISTORY   Reviewed and updated in Epic.  Past Medical History:   Diagnosis Date     A-fib (H) 2011    on coumadin since 1/13     Asthma     as a kid     Chest pain 2/1/2017     Chronic anticoagulation for a-fib 2/15/2013    INR's followed by coumadin clinic at      Diabetes mellitus (H) 2012     Diastolic heart failure 2/15/2013     Dilated cardiomyopathy (H) 1/8/2013     HTN (hypertension)      Hyperthyroidism     Graves, s/p I131 1/13, now on prednisone and methimazole     Mixed type age-related cataract, both eyes 4/8/2022     Morbid obesity (H)      Pulmonary embolism (H) 1/12    hospitalized in [place], on lovenox/coumadin for a few months but stopped, hypercoag w/u neg per pt     Sleep apnea     using CPAP       SURGICAL HISTORY  Reviewed and updated in Epic.  Past Surgical History:   Procedure Laterality Date     PICC INSERTION - TRIPLE LUMEN Right 01/24/2023    right cephalic 5 fr tl picc 48 cm       SOCIAL HISTORY  Reviewed and updated in Epic.  Marital Status: single  Living situation: lives alone in apartment with 4 stairs to enter  Family support: reportedly 14 hrs PCA service/day PTA, also frequent support and check-ins from brother  Vocational History: not working    Social History     Socioeconomic History     Marital status: Single     Spouse name: Not on file     Number of children: Not on file     Years of education: Not on file     Highest education level: Not on file   Occupational History     Not on file   Tobacco Use     Smoking status: Light Smoker     Packs/day: 0.25      Years: 13.00     Pack years: 3.25     Types: Cigarettes     Smokeless tobacco: Never     Tobacco comments:     down to 2 cigs a day   Substance and Sexual Activity     Alcohol use: No     Drug use: No     Sexual activity: Yes     Partners: Male     Birth control/protection: Condom   Other Topics Concern     Parent/sibling w/ CABG, MI or angioplasty before 65F 55M? Not Asked   Social History Narrative    Single, no children        Gyn:        Last pap several years ago, no abnormal    No STIs            Patient is single.  She is no longer working.  She used to work in the area of customer service.  She is currently living with her sister in [place], [place].  She has no pets.  Patient has smoked a half pack of cigarettes a day for the past 10 plus years.  She states that she is down to 5 cigarettes a day with the aid of Wellbutrin.  She does not smoke cigars, pipes or chew tobacco.  She has 1 cup of coffee in the morning.  She does not drink alcohol.  Patient does not exercise.      Social Determinants of Health     Financial Resource Strain: Not on file   Food Insecurity: Not on file   Transportation Needs: Not on file   Physical Activity: Not on file   Stress: Not on file   Social Connections: Not on file   Intimate Partner Violence: Not on file   Housing Stability: Not on file       FAMILY HISTORY  Reviewed and updated in Epic.  Family History   Problem Relation Age of Onset     Thyroid Disease Mother         Grave's D     Diabetes Mother      Heart Disease Mother      Cerebrovascular Disease Mother         dec. 56 yo     Hypertension Mother      Heart Disease Sister         Heart Murmur     Diabetes Sister      Heart Disease Father         dec in his 30s, MI     Psychotic Disorder Brother         Bipolar     Diabetes Brother      Thyroid Disease Brother         Hyper Thyroid     Heart Disease Brother      Thyroid Disease Sister         Hyper Thyroid     Thyroid Disease Sister         Hyper Thyroid      Thyroid Disease Sister         Mental Health Problems     Thyroid Disease Maternal Aunt      Thyroid Disease Maternal Uncle      Cancer No family hx of      Glaucoma No family hx of      Macular Degeneration No family hx of          PRIOR FUNCTIONAL HISTORY   Prior to admission, patient reports having 10 hours of PCA services during day and 4 hours at night.  She required assistance for bathing and IADLs but reports was able to typically perform dressing and grooming/hygeine mod I.    MEDICATIONS  Scheduled meds  Medications Prior to Admission   Medication Sig Dispense Refill Last Dose     ACCU-CHEK RON PLUS test strip USE TO TEST BLOOD SUGAR FOUR TIMES A DAY  OR AS DIRECTED. 360 strip 2      acetaminophen (TYLENOL) 325 MG tablet Take 2 tablets (650 mg) by mouth 3 times daily as needed for mild pain or fever (total acetaminophen dose should not exceed 3000 mg per day) 180 tablet 0      albuterol (PROVENTIL) (2.5 MG/3ML) 0.083% neb solution Take 1 vial (2.5 mg) by nebulization every 6 hours as needed for shortness of breath / dyspnea or wheezing 90 mL 1      albuterol (VENTOLIN HFA) 108 (90 Base) MCG/ACT inhaler Inhale 2 puffs into the lungs every 6 hours as needed for shortness of breath / dyspnea 18 g 1      apixaban ANTICOAGULANT (ELIQUIS) 5 MG tablet Take 1 tablet (5 mg) by mouth 2 times daily 180 tablet 3      atorvastatin (LIPITOR) 40 MG tablet Take 1 tablet (40 mg) by mouth daily 90 tablet 3      blood glucose (NO BRAND SPECIFIED) lancets standard USE TO CHECK  blood sugar fasting each am  prelunch and predinner daily OR AS DIRECTED Call clinic to schedule MD APPT. 400 each 3      blood glucose (NO BRAND SPECIFIED) test strip Use to test blood sugar 4 times daily or as directed. Accu check Ron plus 400 strip 1      blood glucose (NO BRAND SPECIFIED) test strip Use to test blood sugar 4 times daily or as directed. 400 strip 3      blood glucose (NO BRAND SPECIFIED) test strip Use to test blood sugars 3  times daily or as directed 400 strip 3      blood glucose monitoring (ACCU-CHEK FASTCLIX) lancets Use to test blood sugar 4 times daily or as directed.or lancets that go with meter being used 360 each 3      blood glucose monitoring (IBG STAR) meter device kit -PLEASE GIVE PATIENT A DEVICE HER INSURANCE WILL COVER- 1 kit 0      blood glucose monitoring (NO BRAND SPECIFIED) meter device kit Use to test blood sugar 4  times daily or as directed. 1 kit 0      blood glucose monitoring (NO BRAND SPECIFIED) meter device kit Use to test blood sugar 3 times daily or as directed. 1 kit 1      Blood Pressure KIT Use to check blood pressure as directed, once daily and as needed. Record results. 1 kit 1      bumetanide (BUMEX) 1 MG tablet Take 1 tablet (1 mg) by mouth daily 180 tablet 3      bumetanide (BUMEX) 1 MG tablet Take 1 tablet (1 mg) by mouth daily as needed (weight gain 5 lbs in 1 day or 10 lbs in 1 week)        capsaicin (ZOSTRIX) 0.025 % external cream Apply 1 g topically 3 times daily as needed (use for neuropathy pain) 1 Tube 1      ciclopirox (LOPROX) 0.77 % cream Apply topically 2 times daily To feet and toenails. 90 g 6      clotrimazole (LOTRIMIN) 1 % external cream Apply topically 2 times daily as needed (skin irritation) 30 g 0      Continuous Blood Gluc Sensor (FREESTYLE BELLO 14 DAY SENSOR) MISC Change every 14 days. 2 each 11      Continuous Blood Gluc Sensor (FREESTYLE BELLO 14 DAY SENSOR) MISC 1 each every 14 days 2 each 3      Continuous Blood Gluc Sensor (FREESTYLE BELLO 2 SENSOR) MISC 1 Units every 14 days Check BG 4 times daily before meals and at bedtime. 6 each 3      diclofenac (VOLTAREN) 1 % topical gel Apply 2 g topically 3 times daily as needed        diclofenac (VOLTAREN) 1 % topical gel Apply 4 grams to knees or 2 grams to hands four times daily using enclosed dosing card. 100 g 1      diltiazem ER (CARDIZEM SR) 120 MG CP12 12 hr SR capsule Take 1 capsule (120 mg) by mouth 2 times daily         docusate sodium (COLACE) 100 MG tablet Take 100 mg by mouth daily as needed for constipation        Efinaconazole 10 % SOLN Externally apply topically daily To toenails. 8 mL 11      gabapentin (NEURONTIN) 300 MG capsule Take 2 capsules (600 mg) by mouth 2 times daily 270 capsule 1      glucagon 1 MG SOLR injection Inject 1 mg Subcutaneous every 15 minutes as needed for low blood sugar 10 each 11      hydrALAZINE (APRESOLINE) 10 MG tablet Take 1 tablet (10 mg) by mouth 3 times daily as needed (sbp >160)        insulin aspart (NOVOLOG PEN) 100 UNIT/ML pen Inject 1-21 Units Subcutaneous 3 times daily (with meals) 15 mL       insulin aspart (NOVOLOG PEN) 100 UNIT/ML pen Prior to breakfast and lunch, give 1 units per 3 grams of carbohydrate.   Do not give if pre-prandial glucose is less than 60 mg/dL. 15 mL       insulin aspart (NOVOLOG PEN) 100 UNIT/ML pen Prior to supper, give 1 units per 4 grams of carbohydrate.   Do not give if pre-prandial glucose is less than 60 mg/dL. 15 mL       [START ON 2/15/2023] insulin glargine (LANTUS PEN) 100 UNIT/ML pen Inject 70 Units Subcutaneous every 24 hours 15 mL       insulin pen needle (BD RIVER U/F) 32G X 4 MM miscellaneous Use 6 daily or as directed 600 each 3      [START ON 2/19/2023] levothyroxine (SYNTHROID/LEVOTHROID) 200 MCG tablet Take 2 tablets (400 mcg) by mouth once a week        [START ON 2/15/2023] levothyroxine (SYNTHROID/LEVOTHROID) 200 MCG tablet Take 1 tablet (200 mcg) by mouth six times a week        [START ON 2/15/2023] metoprolol succinate ER (TOPROL XL) 50 MG 24 hr tablet Take 1 tablet (50 mg) by mouth daily        [START ON 2/15/2023] nicotine (NICODERM CQ) 14 MG/24HR 24 hr patch Place 1 patch onto the skin daily        nicotine (NICOTROL) 10 MG/ML SOLN inhalation solution Spray 1 spray in nostril every hour as needed for smoking cessation        nystatin (MYCOSTATIN) 821783 UNIT/GM external cream Apply topically 2 times daily To toenails 90 g 6       "order for DME Left foot 1 Units 0      order for DME Equipment being ordered:  5499-9940 $92   Plantar, fasciitis, LG, night splint 1 each 0      order for DME Equipment being ordered: Challenger Wide walker if available - patient needs seat, basket and brakes. 1 each 0      ORDER FOR DME Use your CPAP device as directed by your provider. Pressure change to min 13 max 18cwp 1 each 99      polyethylene glycol (MIRALAX/GLYCOLAX) powder Take 17 g by mouth daily as needed for constipation        prednisoLONE acetate (PRED FORTE) 1 % ophthalmic suspension As directed 1 Drop 3 times daily. Follow physician instructions for the operative eye. 5 mL 1      Respiratory Therapy Supplies (NEBULIZER COMPRESSOR) KIT 1 Device 4 times daily as needed. 1 kit 3      Semaglutide, 1 MG/DOSE, 4 MG/3ML SOPN Inject 1 mg Subcutaneous once a week 9 mL 3      tiotropium (SPIRIVA HANDIHALER) 18 MCG inhalation capsule Inhale contents of one capsule daily. 30 capsule 1      topiramate (TOPAMAX) 25 MG tablet 25 mg at bedtime for 1 week, 50 mg at bedtime for 1 week and 75 mg daily at bedtime thereafter 180 tablet 1        ALLERGIES     Allergies   Allergen Reactions     Lisinopril Angioedema     Penicillins Other (See Comments) and Unknown     CHILDHOOD ALLERGY  CHILDHOOD ALLERGY     Ibuprofen Hives and Rash     Ibuprofen Sodium Hives and Rash         REVIEW OF SYSTEMS  A 10 point ROS was performed and negative unless otherwise noted in HPI.       PHYSICAL EXAM  VITAL SIGNS:  /64 (BP Location: Right arm)   Pulse 75   Resp 18   SpO2 98%   BMI:  Estimated body mass index is 70.48 kg/m  as calculated from the following:    Height as of 1/23/23: 1.702 m (5' 7\").    Weight as of an earlier encounter on 2/14/23: 204.1 kg (450 lb).     General: NAD, in bed   HEENT: NCAT, EOMI, there is mild sclera injection with no pain or discharge  Pulmonary: On RA, no distress, symmetrical chest rise  Cardiovascular: 2+ radial pulse, good cap refill "   Abdominal: soft, nontender   Extremities: no tenderness in calves, the is skin dryness on forearms and thighs  MSK/neuro:   Mental Status:  alert and oriented x3    Cranial Nerves: grossly normal   1. 2nd CN: Pupils equal, round, reactive to light and accomodation. and visual fields intact to confrontation.   2. 3rd,4th,6th CN:  EOMI, appropriate pupillary responses  3. 5th CN: facial sensation intact   4. 7th CN: face symmetrical   5. 8th CN: functional hearing bilaterally  6. 9th, 10th CN: palate elevates symmetrically   7. 11th CN: sternocleidomastoids and trapezii strong   8. 12th CN: tongue midline and without fasciculations     Strength: 4/5 in all muscle groups of the upper and lower extremities    Abnormal movements: None    Coordination: No dysmetria on finger to nose b/l    Speech: intact, no aphasia or dysarthia   Cognition: intact          LABS  CBC RESULTS: Recent Labs   Lab Test 02/12/23  0346 02/09/23  0659 02/08/23  0708   WBC 7.4 8.4 7.9   RBC 4.49 4.66 4.76   HGB 13.2 13.8 14.2   HCT 42.8 44.5 45.9   MCV 95 96 96   MCH 29.4 29.6 29.8   MCHC 30.8* 31.0* 30.9*   RDW 16.6* 16.4* 16.1*    331 314     Last Basic Metabolic Panel:  Recent Labs   Lab Test 02/14/23  1217 02/14/23  0807 02/14/23  0715 02/13/23  0723 02/13/23  0647 02/12/23  1558 02/12/23  1143   NA  --   --  134*  --  134*  --  130*   POTASSIUM  --   --  4.4  --  4.7  --  4.8   CHLORIDE  --   --  96*  --  93*  --  92*   CO2  --   --  26  --  30*  --  27   ANIONGAP  --   --  12  --  11  --  11   * 221* 223*   < > 151*   < > 328*   BUN  --   --  70.0*  --  79.4*  --  78.4*   CR  --   --  1.41*  --  1.68*  --  1.82*   GFRESTIMATED  --   --  46*  --  37*  --  33*   JUSTIN  --   --  9.0  --  9.1  --  9.2    < > = values in this interval not displayed.       Recent Labs   Lab 02/14/23  1217 02/14/23  0807 02/14/23  0715 02/14/23  0517 02/14/23  0311 02/13/23  2138   * 221* 223* 203* 178* 130*       EXAM: XR FOOT PORT RIGHT 2  VIEWS  LOCATION: [facility]  DATE/TIME: 1/28/2023 5:32 PM  IMPRESSION: Moderate soft tissue swelling of the toes. There is soft tissue gas in what appears to be the great toe dorsally. No definite evidence of acute osteomyelitis or fracture. There is normal joint alignment. No significant degenerative changes. Vascular calcification.    CT chest 1/25/23  IMPRESSION: Pulmonary artery enlargement suggesting pulmonary artery  hypertension. Pulmonary edema with cardiomegaly. Osteopenia.    IMPRESSION/PLAN:  Pricilla Brown is a 48 year old female with a past medical history of chronic HFrEF 2/2 NICM, persistent atrial fibrillation (on Eliquis), asthma, diabetes mellitus type II, hypertension, obesity, hypothyroidism 2/2 radioablation for Graves disease, JYOTI, CKD, and recent admission for HF exacerbation (12/25/22-1/1/23) who was admitted on 1/23/23 with heart failure exacerbation with hospital course complicated by encephalopathy, hypercarbia, NIRAJ, hyperkalemia, labile blood glucose, UTI, dry gangrene of toes on right foot, and pain.  She is now admitted to ARU on 2/14/23 for multidisciplinary rehabilitation and ongoing medical management.      Admission to acute inpatient rehab 02/14/23.    Impairment group code: 09 Cardiac -  heart failure exacerbation      1. PT and OT 90 minutes of each on a daily basis, in addition to rehab nursing and close management of physiatrist.      2. Impairment of ADL's: Noted to have impaired activity tolerance, impaired balance, impaired ROM, impaired sensation, impaired strength and pain, all affecting her ability to safely and independently perform basic ADLs.  Goal for mod I with basic ADLs with assist for IADLs and bathing as PTA.    3. Impairment of mobility:  Noted to have impaired activity tolerance, impaired balance, impaired ROM, impaired sensation, impaired strength and pain, all affecting her ability to safely and independently  perform basic mobility.  Goal for mod I with basic mobility.    4. Medical Conditions    Acute on Chronic Systolic Heart Failure (EF 40-45%)  Long-Standing Persistent Atrial Fibrllation  Hypertension   Troponin Elevation 2/2 CHF  Pt admitted with c/o increasing SOB, MURRAY, chest tightness, weight gain in spite of compliance with PO diuretics. NT BNP 5891 (was 2030 during most recent admission).  Lactic acid 2.2 on admission with improvement to 1.1 with diuresis. Troponin mildly elevated, 67-->75-->60 likely 2/2 CHF exacerbation; no concern for ACS currently. Most recent Echo with EF 40-45%. Coronary angiogram 2012 with no significant CAD. Of note, pt recently admitted 12/25-1/1 HF exacerbation, diuresed to 472lbs. Pt weight on admission 1/23 555lbs and down to 450lbs on discharge to ARU  - Continue Bumex 1 mg daily, with additional prn dose for weight gain   - Per cardiology, suspect EDW is around 445-450 lbs.  Monitor daily weights.  Call [facility] clinic if weight increases 5 lbs in a day two days in a row or 10 lbs in 1 week.    - Continue I/O, low Na diet, 2L fluid restriction   - BB: Continue Toprol XL 25mg daily  - ACEi/ARB/ARNI: Discontinued Lisinopril d/t allergy, if BP elevated, can trial Losartan    - SGLT2i: Contraindicated with recurrent yeast infections   - Aldosterone Antagonist: Discontinued Aldactone due to NIRAJ, hyperkalemia  - Rate Control: BB as above. Given mildly reduced EF, ok to use Diltiazem 120 mg BID  - Anticoagulation: CHADSVASC 3; Continue PTA apixaban  - Continue hydralazine 10 mg TID  - [facility] consulted; follow up after ARU discharge.  Plan to discuss CardioMEMS at this appointment.      Chronic Kidney Disease Stage 3b  NIRAJ, improving  Longstanding history of diabetes and HTN. Baseline Cr ~1.2-1.4. Cr on admission 1.56. During hospitalization, patient diuresed to 427 lbs with NIRAJ, Cr peak 2.1. After backing off on diuretics, Cr improved to 1.4 on 2/14 at admission to ARU  - Avoid nephrotoxic  "agents  - Resume diuretics as listed above  - Trend BMP every M/Th     Hyperkalemia, resolved  Potassium continually rising since 2/9 with peak 2/11 @ 7.3.  EKG without acute changes, rhythm stable in a fib.  Nephrology consulted.  Potassium shifted with insulin, calcium gluconate for cardiac stability, lokelma 15 g X 1, IV Bumex total of 6 mg on 2/11/23.  Spironolactone discontinued.  - Low K diet  - I/O  - Continue to hold spironolactone, per nephrology can consider resuming once Cr stable  - Diuresis as above  - Trend BMP every M/Th    Hyponatremia  In setting of aggressive diuresis.  Now mild, improved to 134 on 2/14.  - Trend BMP every M/Th    Hypothyroidism 2/2 below  Graves Disease s/p Radioiodine Ablation  TSH 20.4 and free T4 0.82 on admission. Endocrine consulted.  - Continue levothyroxine 200 mcg M-Sat + 400 mcg on Sunday.    - Recheck TFT in a month     Type 2 Diabetes Mellitus, insulin-dependent, uncontrolled  Hyperglycemia  Diabetic neuropathy  A1c 13.5% on 12/25/2022. Discrepancies in patient's home regimen.  Per endo, PTA on \"Toujeo 225 units daily, novolog 100 units with each meal + CS, and ozempic 1 mg weekly. She could not tolerate empaglifozin due to frequent UTIs and metformin due to GI side effects.\" Endocrinology consulted during admission.  Insulin gtt started 2/1 per endocrinology, transitioned back to subcutaneous lantus and novolog on 2/3/23.   - Monitor BG TIC AC + HS + 0200  - Continue Lantus 70 units qAM  - Continue Novolog very high intensity sliding scale insulin  - Continue Novolog 1:3g CHO at breakfast and lunch, 1:4g CHO at dinner  - Unable to resume PTA Ozepmic at ARU admission (not on formulary).  Can consider substitution with liraglutide if indicated.  - Hypoglycemia protocol  - OP follow up Thelma Archibald/Dr Silva Damon     Dry gangrene of Right Toes 1, 3, and 4  Present on admission, pt unaware how long wounds have been present.  Vascular consulted; JOSETTE's completed " "showing patent vessels and normal waveforms. They recommend orthopedic foot and ankle consult.  Ortho consulted, plan for wound care and follow-up as outpatient.   WOCN following as inpatient and provided wound care recs.  - WOCN consulted at ARU to continue to follow, appreciate assistance  - WOCN following: wound cares to right toe daily. Paint toes with betadine. Be sure to apply over dry eschar. Ok to leave open to air. No soaking feet.  - WBAT  - Follow up with ortho as outpatient to discuss elective amputation     Urinary Retention  Phan catheter placed on admission for strict I/O while aggressively diuresing. Removed 2/11, pt with urinary retention and straight cath x 3, Phan replaced. Urology curbside, continue phan for now, can re-attempt voiding trial as OP.  - Continue phan, consider TOV at ARU if indicated  - If ongoing retention or phan, follow up with OP Urology     Asthma  Patient endorsing mouth irritation w/ inhaler use. Per inpatient team, roof of mouth erythematous and appears excoriated but w/o evidence of yeast.   - Continue PTA inhalers (levalbuterol and spiriva)     JYOTI  - Continue BiPAP at night    Hyperlipidemia   - Continue PTA Lipitor 40 mg daily     Chronic Pain  Patient reporting pain everywhere. Using narcotics along with gabapentin, topical nsaids, and muscle relaxants. Aggressive diuresis vs diabetic neuropathy.  Pain management luz consulted 1/31 while inpatient; decrease gabapentin to BID, stopped flexeril.  - Continue PRN Tylenol, voltaren, capsaicin, robaxin, hydroxyzine   - Continue gabapentin 600 mg BID  - Continue Topamax    Morbid obesity  Estimated body mass index is 70.48 kg/m  as calculated from the following:    Height as of 1/23/23: 1.702 m (5' 7\").    Weight as of an earlier encounter on 2/14/23: 204.1 kg (450 lb).  - Complicates rehab/cares       5. Adjustment to disability:  Clinical psychology to eval and treat if indicated  6. FEN: mod cons CHO, 2g NA, 2g " K, 2L fluid restriction  7. Bowel: continent, monitor, PRN bowel meds available  8. Bladder: phan in place at ARU as above  9. DVT Prophylaxis: apixaban  10. GI Prophylaxis: not indicated  11. Code: full  12. Disposition: goal for home  13. ELOS:  10 days  14. Rehab prognosis: goal for home  15. Follow up Appointments on Discharge: PCP in 1-2 weeks, cardiology ([facility] clinic), endocrinology, urology        Did not see patient on this date.  Assisted with preparation of the note and discussed with Dr. Vladislav Vazquez, PM&R staff physician   Lulu Guerrero PA-C  Physical Medicine & Rehabilitation          Physician Attestation   I, Vladislav Vazquez DO, was present with the advanced provider who participated in the service and in the documentation of the note.  I have verified the history and personally performed the physical exam and medical decision making.  I agree with the assessment and plan of care as documented in the note.      Key findings: patient is awake and alert, no distress.  Reviewed rehab goals and recovery timeline.     Please see A&P for additional details of medical decision making.    I have personally reviewed the following data over the past 24 hrs:    N/A  \   N/A   / N/A     134 (L) 96 (L) 70.0 (H) /  175 (H)   4.4 26 1.41 (H) \         Vladislav Vazquez DO  Date of Service (when I saw the patient): 02/14/23      I spent a total of 60 minutes face to face and coordinating care of Pricilla Brown. Over 50% of my time on the unit was spent counseling the patient and /or coordinating care regarding cardiac debility.

## 2023-02-14 NOTE — CONSULTS
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 She is admitted to ARU on 2/14/23.    Acute on Chronic Systolic Heart Failure (EF 40-45%)  Long-Standing Persistent Atrial Fibrllation  Hypertension   Troponin Elevation 2/2 CHF, resolved  Pt admitted with c/o increasing SOB, MURRAY, chest tightness, weight gain in spite of compliance with PO diuretics. NT BNP 5891 (was 2030 during most recent admission).  Lactic acid 2.2 on admission with improvement to 1.1 with diuresis. Troponin mildly elevated, 67-->75-->60 likely 2/2 CHF exacerbation; no concern for ACS currently. Most recent Echo with EF 40-45%. Coronary angiogram 2012 with no significant CAD. Of note, pt recently admitted 12/25-1/1 HF exacerbation, diuresed to 472lbs. Pt weight on admission 1/23 555lbs.   - Volume Status: Euvolemic  - Diuresis: continue to hold PO Bumex 1 mg daily              - New EDW is 430 lbs, goal weigh 445-450lb    - Beta Blocker: Continue Toprol XL to 50mg  - ACEi/ARB/ARNI: contraindicated d/t lisinopril allergy.   - SGLT2i: Contraindicated with recurrent yeast infections   - Aldosterone Antagonist: Hold/discontinue PTA Spironolactone 50mg d/t hyperkalemia  - Rate Control: BB as above. Cont diltiazem 120 BID due to persistent tachycardia and borderline reduced EF.   - Afterload reduction: Cont hydralazine 10mg PO TID (hold parameters SBP <110), can give additional prn if sbp >180  - Anticoagulation: CHADSVASC 3; Continue PTA apixaban  - Strict I/O & Daily Weights  - Low  Na Diet / 2L Fluid Restriction  - BMP M Th and as needed if new concerns  - [facility] clinic to continue to follow    Acute on chronic Kidney Disease Stage 3b  Hyperkalemia  Longstanding history of diabetes and HTN. Baseline Creatinine appears to be around 1.2-1.4. Elevated until recently prior to discharge in the setting of aggressive diuresis. Creatinine on ARU admission 1.4. K up to 7.3 (2/9) w/o EKG changes. Nephrology consulted inpatient.   - trend BMP  - Hold spironolactone   - bumex 1mg daily and 1mg additional prn weight gain >5lb or >10lb in 1 week  - Avoid nephrotoxic agents  - EKG prn if hyperkalelmia recurs      Graves Disease s/p Radioiodine Ablation  Hypothyroidism 2/2 Radioiodine Ablation  TSH 20.4 and free T4 0.82 on admission. Endocrine followed during inpatient admission and updated levothyroxine dosing.   - Endocrine consulted; recommendations below   - Continue levothyroxine 200 mcg M-Sat- 400 mcg on Sunday.    - Recheck TFT in a month (March 2023)     Type 2 Diabetes Mellitus on longterm insulin c/b peripheral neuopathy (A1C 13.5 on 12/25/22)  Morbid obesity  Discrepancies in patient's home regimen. On insulin drip briefly while inpatient. Endocrinology consulted during inpatient admission. Glucose trend as follows  Recent Labs   Lab 02/14/23  1217 02/14/23  0807 02/14/23  0715 02/14/23  0517 02/14/23  0311 02/13/23  2138   * 221* 223* 203* 178* 130*     - Lantus increased per endocrinology to 70 units daily per Endocrine, can consider decreasing if low glucose  - Continue Novolog mealtime insulin  1:4g CHO(while inpatient)  - Continue high intensity SSI   - POCT glucose checks QID   - Hypoglycemia protocol  - Ok to resume PTA Ozempic in ARU  - Discharge recommendations per endocrinology; Toujeo 40 at bedtime, aspart 10u w/ each meal + additional coverage based on pre-meal glucose (140-160) + 1 unit, (161-180) + 2 units, (181-200) + 3 units, and resume Ozempic 1mg weekly.     Right Toe Wound,  necrotic toes  Present on admission, right great toe and 3rd toe, dry gangrene, pt unaware how long wounds have been present. Vascular, podiatry and orthopedics consulted inpatient. ABIs with normal waveforms.  - WOCN: wound cares to right toe daily. Paint toes with betadine. Be sure to apply over dry eschar. Ok to leave open to air. No soaking feet.  - Ortho follow up to discuss elective amputation on 2/17/23 at 10:25 with Dr. Oscar Leahy.  - Continue with LE elevation, able to ambulate w/ PT/OT     Urinary retention with indwelling phan  Acute simple cystitis, resolved  Gross hematuria  Hx E. Coli UTI  Positive urine cultures for E. Coli and completed macrobid 5 day course through 2/7. Urine is pink on ARU admission, trace. Patient failed trial of void inpatient. Urology was consulted inpatient and recommended continue indwelling phan.   - continue indwelling phan  - ensure phan is changed at 2-4 weeks  - will need urology follow up for trial of void     Chronic intermittent Asthma  Lungs clear on ARU admission  - Continue PTA inhalers (levalbuterol and spiriva)  - Magic mouthwash ordered       JYOTI - Continue BiPAP at night    Hyperlipidemia - Continue lipitor 40 mg daily       Bilateral Upper and lower extremity joint Pain  Patient reports pain is improved. Has been using narcotics (percocet) along with shae, topical nsaids, and muscle relaxants. Aggressive diuresis vs diabetic neuropathy. Pain management curbside consulted inpatient 1/31: decrease gabapentin to BID, stop flexeril, add Robaxin 400 q6hr PRN  - PRN Lidocaine patches  - continue inpatient pain regiment at ARU  - encourage increase mobility  - follow up with PCP on discharge        The patient's care was discussed with the Bedside Nurse, Patient and ARU Team.    Clinically Significant Risk Factors Present on Admission               # Drug Induced Coagulation Defect: home medication list includes an anticoagulant medication    # Hypertension:  "home medication list includes antihypertensive(s)  # Chronic systolic heart failure: echo within the past year with EF <40%     # DMII: A1C = 13.5 % (Ref range: <5.7 %) within past 3 months    # Severe Obesity: Estimated body mass index is 70.48 kg/m  as calculated from the following:    Height as of 1/23/23: 1.702 m (5' 7\").    Weight as of an earlier encounter on 2/14/23: 204.1 kg (450 lb).           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 She is admitted to ARU on 2/14/23.    She is feeling generally improved and quite motivated to regain her strength.  She continues to have pain in the bilateral arms and legs that is difficult to describe and seems to be improving and is largely a \"skipping\" pain that occurs in alternating sections of the body and improves with pain medications.     Pricilla notes she has a \"gas sensation\" and notes feelings of movement in the abdomen without pain or N/V, and notes her stools have been regular and nonbloody.  She is tolerating po without issues.     She denies chest pain, sob, palpitations, increased swelling, skin changes, abdominal pain or other complaints.       Past Medical History    Past Medical History:   Diagnosis Date     A-fib (H) 2011    on coumadin since 1/13     Asthma     as a kid     Chest pain 2/1/2017     Chronic anticoagulation for a-fib 2/15/2013    INR's [facility]in [facility]     " Diabetes mellitus (H) 2012     Diastolic heart failure 2/15/2013     Dilated cardiomyopathy (H) 1/8/2013     HTN (hypertension)      Hyperthyroidism     Graves, s/p I131 1/13, now on prednisone and methimazole     Mixed type age-related cataract, both eyes 4/8/2022     Morbid obesity (H)      Pulmonary embolism (H) 1/12    hospitalized in [place], on lovenox/coumadin for a few months but stopped, hypercoag w/u neg per pt     Sleep apnea     using CPAP       Past Surgical History   Past Surgical History:   Procedure Laterality Date     PICC INSERTION - TRIPLE LUMEN Right 01/24/2023    right cephalic 5 fr tl picc 48 cm       Medications   I have reviewed this patient's current medications          Physical Exam   Vital Signs:     BP: 102/64 Pulse: 75   Resp: 18 SpO2: 98 % O2 Device: None (Room air)    Weight: 0 lbs 0 oz  GENERAL: Alert and oriented x 3. NAD.able to sit upright with grasping the arm of chair. Cooperative.   HEENT: Anicteric sclera. Mucous membranes moist. NC. AT. EOMI. PERRLA  CV: RRR. S1, S2. No murmurs appreciated.   RESPIRATORY: Effort normal on RA. Lungs CTAB with no wheezing, rales, rhonchi.   GI: obese, Abdomen soft, NABS, NT  NEUROLOGICAL: No focal deficits. Moves all extremities.  CN 2-12 grossly intact.  EXTREMITIES: No pitting peripheral edema. Intact bilateral pedal pulses.   SKIN: No jaundice. No rashes on exposed skin    Medical Decision Making       60 MINUTES SPENT BY ME on the date of service doing chart review, history, exam, documentation & further activities per the note.      Data     I have personally reviewed the following data over the past 24 hrs:    N/A  \   N/A   / N/A     134 (L) 96 (L) 70.0 (H) /  187 (H)   4.4 26 1.41 (H) \

## 2023-02-14 NOTE — PROGRESS NOTES
Shift 9030-9746   ?  A/I : Monitored vitals and assessed pt status. A0x4. Vitals stable on RA. Afib, rates controlled. Afebrile. Urinating adequately via phan. Small BM x2 this shift. Good appetite on 2 g Na and moderate card consistent diet, patient snacking between meals, reminded to notify nursing when eating, so can cover carbs appropriately. Denies chest pain, palpitations, shortness of breath, nausea, dizziness. Generalized pain in arms, back, leg and foot managed with PRN medications. No new skin concerns observed. Up with lift assistance, working with OT/PT on mobility and ambulation. IV access: LPIV x2 SL.      Changed/highlights: bumex po resumed   Running:   PRN: Perocet x1  ?  P: Continue to monitor Pt status and report changes to Cards 1. Discharge to ARU pending bed availability.

## 2023-02-14 NOTE — PROGRESS NOTES
Care Management Discharge Note    Discharge Date: 02/14/2023       Discharge Disposition: ARU    [facility]  Address: [address] [place] [id number]  - 5th floor of the building   - Phone: [phone]  - RN to RN #: [phone]    Discharge Services:  ARU therapy services    Discharge DME:  n/a    Discharge Transportation: [employer] (Phone: [phone]) 3pm stretcher ride. SW completed the PCS form for billing transport and gave to the charge RN at the 6C charge RN desk.     Private pay costs discussed: Not applicable    PAS Confirmation Code:  Not needed as pt is discharging to an ARU   Patient/family educated on Medicare website which has current facility and service quality ratings:  yes    Education Provided on the Discharge Plan:  Yes. MARGO met with the pt at bedside and informed her of the 3pm stretcher ride. Pt expressed excitement in getting over to ARU and asked appropriate questions in regards to what she can expect during her ARU stay which SW answered.     Persons Notified of Discharge Plans: pt, bedside RN, charge RN, Cards 1 provider, [facility] rehab admissions. MARGO also called the pt's CADI CM (Flores, Phone: [phone]) at 1:21pm and gave her the update on discharge planning. Flores was appreciative of this information and told the SW that she has the contact for ARU already and will call them tomorrow morning so SW/RNCC has their info.     Patient/Family in Agreement with the Plan:  yes    Handoff Referral Completed: Yes    ___________________    DANIEL Villagomez, STERLING  6C   [facility]  Phone: [phone]  Pager: [phone]     Patient requests all Lab, Cardiology, and Radiology Results on their Discharge Instructions

## 2023-02-15 NOTE — PROGRESS NOTES
CLINICAL NUTRITION SERVICES - ASSESSMENT NOTE     Nutrition Prescription    RECOMMENDATIONS FOR MDs/PROVIDERS TO ORDER:  None today     Malnutrition Status:    Patient does not meet two of the established criteria necessary for diagnosing malnutrition    Recommendations already ordered by Registered Dietitian (RD):  Updated snack order to: Food Snacks BID: String cheese, crackers, fruit cup (peaches/pears or grapes) and SF Gelatein @ 2 pm, and turkey sandwich on wheat bread with lettuce, tomato and quinn & a fruit cup (peaches/pears/grapes) at 8 pm - snacks are approved above diet orders    Future/Additional Recommendations:  Monitor meal/snack intakes, wt/lab trends      REASON FOR ASSESSMENT  Pricilla Brown is a/an 48 year old female assessed by the dietitian for Provider Order - assess/optimize nutrition in setting of HF, CKD, DM, obesity and pt/family request for diet education    NUTRITION/MEDICAL HISTORY  Per chart review: Pt admits to ARU for rehabilitation in the setting of heart failure exacerbation with hospital course complicated by encephalopathy, hypercarbia, NIRAJ, hyperkalemia, labile blood glucose, UTI, dry gangrene of toes on right foot, and pain. Other past medical history noted for chronic HFrEF 2/2 NICM, persistent atrial fibrillation (on Eliquis), asthma, diabetes mellitus type II, hypertension, obesity, hypothyroidism 2/2 radioablation for Graves disease, JYOTI, CKD.    Per pt visit: Visited pt at bedside, reviewed weight trends below/improving fluid status, staff was obtaining pt's weight during my visit, but noted bed scale may be difficult to fully assess, but pt feels lower extremities continue to improve. Reviewed snacks at hospital and now, pt admits is much more hungry now and would like a little more at snack times, wants to continue BID, so will adjust as above per pt request, also encouraged protein for fluid mobilization, and pt agreeing to sugar free high protein jello added in into  "snacks as well.     It is noted pt was provided diet education at the hospital, will reinforce concepts prior to ARU discharge.     CURRENT NUTRITION ORDERS  Diet: 2 g Potassium, 2 g Sodium and Moderate Consistent Carbohydrate, 2000 ml FR  Intake/Tolerance: 100% per flow sheets     LABS  Labs reviewed    MEDICATIONS  PRN Percocet, insulin to CHO dosing at all meals and snacks, very high correction scale insulin, Lantus, Lipitor, Hydralazine, Bumex,     ANTHROPOMETRICS  Ht Readings from Last 1 Encounters:   01/23/23 1.702 m (5' 7\")   Most Recent Weight: N/A    IBW: 61.4 kg  BMI Readings from Last 1 Encounters:   02/14/23 70.48 kg/m    BMI: Obesity Grade III BMI >40  Weight History:   Wt Readings from Last 20 Encounters:   02/14/23 (!) 204.1 kg (450 lb)   01/01/23 (!) 214.4 kg (472 lb 9.6 oz)   10/31/22 (!) 201 kg (443 lb 3.2 oz)   10/26/22 (!) 208.6 kg (459 lb 14.4 oz)   06/21/22 (!) 191 kg (421 lb)   06/15/22 (!) 189.8 kg (418 lb 8 oz)   04/05/22 (!) 193.2 kg (426 lb)   11/23/21 (!) 193.2 kg (426 lb)   10/20/21 (!) 193.2 kg (426 lb)   09/09/21 (!) 196.2 kg (432 lb 8 oz)   07/23/21 (!) 198.1 kg (436 lb 12.8 oz)     Weight assessment: Not quite significant 4.6% weight loss in 1 month, 7 lb wt gain in 3 months, 29 lb wt gain in >6 months.     Dosing Weight: 96.8 kg - adjusted BW    ASSESSED NUTRITION NEEDS  Estimated Energy Needs: 1935 kcals/day (20 kcals/kg)  Justification: Obese  Estimated Protein Needs:  grams protein/day (1 - 1.2 grams of pro/kg)  Justification: Maintenance vs increased needs for wounds vs other  Estimated Fluid Needs: 2000 mL/day  Justification: On a fluid restriction    PHYSICAL FINDINGS  WOC RN note reviewed - wounds to bilateral feet, peeling skin noted     MALNUTRITION  % Intake: No decreased intake noted  % Weight Loss: Weight loss does not meet criteria  Subcutaneous Fat Loss: None observed  Muscle Loss: Difficult to assess  Fluid Accumulation/Edema: Not specified by nursing in flow " sheets, will consider severe at present   Malnutrition Diagnosis: Patient does not meet two of the established criteria necessary for diagnosing malnutrition    NUTRITION DIAGNOSIS  Predicted increased nutrient needs related to wounds vs other       INTERVENTIONS  Implementation  Nutrition Education: Reviewed diet orders and nutrition plan of care with pt at bedside, will continue to educate as appropriate while pt is admitted to ARU    Medical food supplement therapy - ordered as above   Modify composition of snacks - ordered as above     Goals  Patient to consume % of nutritionally adequate meal trays TID, or the equivalent with supplements/snacks.     Monitoring/Evaluation  Progress toward goals will be monitored and evaluated per protocol.    Roxy Youssef, RD, CNSC, LD  ARU RD pager: [phone]

## 2023-02-15 NOTE — PROGRESS NOTES
02/15/23 0656   Appointment Info   Signing Clinician's Name / Credentials (OT) Daniella Ledbetter OTR-L   Living Environment   People in Home alone   Current Living Arrangements apartment   Home Accessibility stairs to enter home   Number of Stairs, Main Entrance 5   Stair Railings, Main Entrance railings safe and in good condition;railings on both sides of stairs   Transportation Anticipated family or friend will provide   Self-Care   Usual Activity Tolerance good   Current Activity Tolerance poor   Equipment Currently Used at Home cane, straight;walker, rolling   Activity/Exercise/Self-Care Comment lives alone in apartment with 4 stairs to enter.10 hours of PCA services during day and 4 hours at night.  She required assistance for bathing and IADLs but reports was able to typically perform dressing and grooming/hygeine mod I. 10 hours of PCA services during day and 4 hours at night.  She required assistance for bathing and IADLs but reports was able to typically perform dressing and grooming/hygeine mod I.   General Information   Onset of Illness/Injury or Date of Surgery 01/23/23   Referring Physician Vladislav Vazquez DO   Patient/Family Therapy Goal Statement (OT) to return home   Additional Occupational Profile Info/Pertinent History of Current Problem per MD report pt is a 48 year old female with past medical history of chronic HFrEF 2/2 NICM, persistent atrial fibrillation (on Eliquis), asthma, diabetes mellitus type II, hypertension, obesity, hypothyroidism 2/2 radioablation for Graves disease, JYOTI, CKD, and recent admission for HF exacerbation (12/25/22-1/1/23) who was admitted on 1/23/23 with heart failure exacerbation.       She underwent aggressive diuresis, with approximately 100-pound decrease in weight during her stay.     Patient became progressively lethargic/obtunded and was started on BiPAP during daytime on 1/27.  Patient was transferred to ICU on 1/28 for airway management in setting of  encephalopathy with hypercarbia.   Left Upper Extremity (Weight-bearing Status) full weight-bearing (FWB)   Right Upper Extremity (Weight-bearing Status) full weight-bearing (FWB)   Left Lower Extremity (Weight-bearing Status) full weight-bearing (FWB)   Right Lower Extremity (Weight-bearing Status) full weight-bearing (FWB)   Heart Disease Risk Factors Medical history   Cognitive Status Examination   Cognitive Status Comments functional at ot eval will assess higher level cog   Visual Perception   Visual Impairment/Limitations WNL   Sensory   Sensory Quick Adds sensation intact   Pain Assessment   Patient Currently in Pain No   Range of Motion Comprehensive   General Range of Motion no range of motion deficits identified   Strength Comprehensive (MMT)   Comment, General Manual Muscle Testing (MMT) Assessment u/e wfl B   Coordination   Coordination Comments wfl for adls not formally assed upon ot eval   Clinical Impression   Criteria for Skilled Therapeutic Interventions Met (OT) Yes, treatment indicated   OT Diagnosis decrease adls and Iadls prolong hospitalization   OT Problem List-Impairments impacting ADL problems related to;activity tolerance impaired;balance;strength;cognition   Planned Therapy Interventions (OT) ADL retraining;IADL retraining;balance training;bed mobility training;ROM;strengthening;transfer training;home program guidelines;progressive activity/exercise;risk factor education;cognition   OT Total Evaluation Time   OT Eval, Moderate Complexity Minutes (57766) 15   OT Goals   Therapy Frequency (OT) Daily   OT Goals Hygiene/Grooming;Upper Body Dressing;Lower Body Dressing;Upper Body Bathing;Lower Body Bathing;Bed Mobility;Transfers;Toilet Transfer/Toileting;Meal Preparation;Home Management;Cognition   OT: Hygiene/Grooming modified independent   OT: Upper Body Dressing Modified independent   OT: Lower Body Dressing Modified independent   OT: Upper Body Bathing Supervision/stand-by assist   OT:  Lower Body Bathing Minimal assist   OT: Bed Mobility Minimal assist  (shower)   OT: Toilet Transfer/Toileting Modified independent   OT: Meal Preparation Supervision/stand-by assist   OT: Home Management Supervision/stand-by assist   OT: Cognitive Patient/caregiver will verbalize understanding of cognitive assessment results/recommendations as needed for safe discharge planning   Self-Care/Home Management   Self-Care/Home Mgmt/ADL, Compensatory, Meal Prep Minutes (70636) 45   Treatment Detail/Skilled Intervention ot focus on body mechanics with trasnfer energy conservation techniques bed mobility and trasnfers   OT Discharge Planning   OT Plan transers bed mobility   Total Session Time   Timed Code Treatment Minutes 45   Total Session Time (sum of timed and untimed services) 60   OT - Acute Rehab Center Time   Individual Time (minutes) - enter zero if not applicable - OT 68   Group Time (minutes) - enter zero if not applicable  - OT 0   Concurrent Time (minutes) - enter zero if not applicable  - OT 0   Co-Treatment Time (minutes) - enter zero if not applicable  - OT 0   ARC Total Session Time (minutes) - OT 68   Roll Left and Right   Comment ot sba with bed rail   Lying to Sitting on Side of Bed   Comment min a   Sit to Stand   Comment ot min -cga at bed rail liko to w/c unable to sit to stand from eob trial 4 differnt ways with air matress wound nurse changing to regular matress

## 2023-02-15 NOTE — PLAN OF CARE
Discharge Planner Post-Acute Rehab OT:     Discharge Plan: home with pca 10 hrs during the day and 4 at night    Precautions: falls    Current Status:  ADLs:    Mobility: min a transfers cga with fww to 22 inch height    Grooming:sba    Dressing: dep    Bathing: tba    Toileting: tba  IADLs:tba  Vision/Cognition: assess higher cog     Assessment: pt motivated needed liko off bed once in chair cga wit fww with transfers, pt going to get standard bed  Has air matress that decreases adl function    Other Barriers to Discharge (DME, Family Training, etc):closer to d/ discussed need for commode over toilet

## 2023-02-15 NOTE — CONSULTS
[facility]  WO Nurse Inpatient Assessment     Consulted for: Bilateral feet    Patient History (according to provider note(s):      Per Dr Vladislav Vazquez on 2/14/2023: Pricilla Brown is a 48 year old female with a past medical history of chronic HFrEF 2/2 NICM, persistent atrial fibrillation (on Eliquis), asthma, diabetes mellitus type II, hypertension, obesity, hypothyroidism 2/2 radioablation for Graves disease, JYOTI, CKD, and recent admission for HF exacerbation (12/25/22-1/1/23) who was admitted on 1/23/23 with heart failure exacerbation with hospital course complicated by encephalopathy, hypercarbia, NIRAJ, hyperkalemia, labile blood glucose, UTI, dry gangrene of toes on right foot, and pain.  She is now admitted to ARU on 2/14/23 for multidisciplinary rehabilitation and ongoing medical management.    Areas Assessed:      Areas visualized during today's visit: Bilateral feet    Wound location: Bilateral feet and toes     2/15 Right                                                        2/15 Left    Per H&P: Present on admission, pt unaware how long wounds have been present.  Vascular consulted; JOSETTE's completed showing patent vessels and normal waveforms. They recommend orthopedic foot and ankle consult.  Ortho consulted, plan for wound care and follow-up as outpatient.   WOCN following as inpatient and provided wound care recs.    On [facility] assessment today, bilateral feet and toes washed well with soap and water to help remove devitalized skin. Devitalized skin trimmed away from dorsal and plantar aspects of the feet. On the bilateral toes, devitalized epidermis peeled away easily to reveal intact, pink skin. Specifically, on right great and 4th toes, necrotic caps pulled away with nail attached to reveal intact, pink epidermis.     Currently, the only open wounds present are on the nailbeds of the great and 4th toes. Remainder is skin is intact, soft, warm to touch,  no evidence of dry gangrene.     Patient will benefit from consistent foot care.    Treatment Plan:     Bilateral toes and feet: Wash daily with warm water, soap and a washcloth, taking particular attention to wash well between and under toes. Dry thoroughly with a clean towel. Moisturize skin on bilateral feet and legs with Sween 24. [place] missing toenail beds on great and 4th toes with betadine.     Orders: Written    RECOMMEND PRIMARY TEAM ORDER: None, at this time  Education provided: plan of care  Discussed plan of care with: Patient, Nurse and Physician  [facility] follow-up plan: weekly  Notify WO if wound(s) deteriorate.  Nursing to notify the Provider(s) and re-consult the [facility] if new skin concern.    DATA:     Current support surface: Standard  Low air loss (PATY pump, Isolibrium, Pulsate, skin guard, etc): discussed with RN that patient does not have any pressure injuries or open wounds. According to the bed algorithm, patient can use a standard bariatric mattress   Containment of urine/stool: Continent of bowel and Indwelling catheter  BMI: There is no height or weight on file to calculate BMI.   Active diet order: Orders Placed This Encounter      Combination Diet 2 gm K Diet; Moderate Consistent Carb (60 g CHO per Meal) Diet; 2 gm NA Diet     Output: I/O last 3 completed shifts:  In: 1020 [P.O.:1020]  Out: 2000 [Urine:2000]     Labs: Recent Labs   Lab 02/12/23  0346 02/11/23  1452   ALBUMIN  --  3.1*   HGB 13.2  --    WBC 7.4  --      Pressure injury risk assessment:   Sensory Perception: 3-->slightly limited  Moisture: 3-->occasionally moist  Activity: 2-->chairfast  Mobility: 2-->very limited  Nutrition: 3-->adequate  Friction and Shear: 2-->potential problem  Shane Score: 15    Sade Castaneda RN CWOCN  Contact through Vocera: [facility] ([facility] [place])  Vocera group: [facility]  Dept. Office Number: [phone]

## 2023-02-15 NOTE — PLAN OF CARE
Goal Outcome Evaluation: Day 1 Admission    VS: /69   Pulse 86   Temp 99  F (37.2  C) (Oral)   Resp 18   SpO2 98%      O2: RA, dyspnea on exertion, BIPAP at HS, RT to provide   Output/LBM: Continent of bowel, LBM 2/14, Keenan in place.   Activity: 2x/Liko   Skin: Necrotic toes, R hip abrasian   Pain: Rates consistently at 9. Percocet, tylenol, robaxin, given as available   CMS: A/O   Diet: Na/K restricted diet, FR 2000ml, BG/Carb coverage   LDA: Keenan   Additional Info/Plan

## 2023-02-15 NOTE — PROGRESS NOTES
"  [facility]   Acute Rehabilitation Unit  Daily progress note    INTERVAL HISTORY  Pricilla Brown was seen and examined at bedside this morning finishing PT session.  No acute events reported overnight.  She is happy to be here at rehab, eager to start working more with therapies, and to discharge home as soon as possible.  She reports an ok night of sleep, some difficulty finding a good temperature in her room.  She expresses some concerns regarding wound care for right foot and ongoing monitoring.  She has \"accepted\" that she will likely need amputation of at least 1 of the toes, but wants to ensure wound care done appropriately until follow-up and requests more education on this.  She is unsure how long the wounds have been there or how this happened.  Does note some pain on plantar surface of foot at the base of the three affected toes (1,3,4).  She also requests to resume snacks at 2pm and 8pm as she had while at the hospital.  States she gets hungry frequently and wants to maintain the recommended diet rather than outside food, but needs more frequent intake than the 3 provided meals.  Will have RD follow up as well.  She denies any chest pain, palpitations, shortness of breath, cough, dizziness, nausea, abdominal pain. Notes a good BM the day before yesterday.  Does endorse significant gas, which is bothering her.  No relief with simethicone.    Functionally, therapy evals underway today.    MEDICATIONS    apixaban ANTICOAGULANT  5 mg Oral BID     atorvastatin  40 mg Oral Daily     bumetanide  1 mg Oral Daily     diltiazem ER  120 mg Oral BID     gabapentin  600 mg Oral BID     hydrALAZINE  10 mg Oral TID     insulin aspart   Subcutaneous Daily with breakfast     insulin aspart   Subcutaneous Daily with lunch     insulin aspart   Subcutaneous Daily with supper     insulin aspart  1-22 Units Subcutaneous TID AC     insulin aspart  1-16 Units Subcutaneous At Bedtime     " insulin glargine  70 Units Subcutaneous QAM AC     levothyroxine  200 mcg Oral Once per day on Mon Tue Wed Thu Fri Sat     [START ON 2/19/2023] levothyroxine  400 mcg Oral Weekly     metoprolol succinate ER  50 mg Oral Daily     miconazole   Topical BID     nicotine  1 patch Transdermal Daily    And     nicotine   Transdermal Q8H OPHELIA     nystatin   Topical BID     topiramate  50 mg Oral Daily     umeclidinium  1 puff Inhalation Daily        acetaminophen, albuterol, albuterol, sore throat, bumetanide, capsaicin, clotrimazole, glucose **OR** dextrose **OR** glucagon, diclofenac, docusate sodium, hydrALAZINE, hydrOXYzine **OR** hydrOXYzine, insulin aspart, methocarbamol, naloxone **OR** naloxone **OR** naloxone **OR** naloxone, oxyCODONE-acetaminophen, - MEDICATION INSTRUCTIONS -, polyethylene glycol, simethicone     PHYSICAL EXAM  /67 (BP Location: Right arm)   Pulse 85   Temp 97.1  F (36.2  C) (Oral)   Resp 20   SpO2 98%   Gen: NAD, lying in bed  HEENT: NC/AT, MMM  Cardio: irregularly irregular, no murmurs appreciated  Pulm: non-labored on room air, lungs CTA bilaterally  Abd: obese, soft, non-tender, bowel sounds present  Ext: no pitting edema, dry skin on bilateral feet, necrotic tips of right great and 4th toes  Neuro/MSK: awake, alert, PERRL, EOMI, face symmetric, moving all extremities in bed    LABS  Last Basic Metabolic Panel:  Recent Labs   Lab Test 02/15/23  0153 02/14/23  2206 02/14/23  1737 02/14/23  0807 02/14/23  0715 02/13/23  0723 02/13/23  0647 02/12/23  1558 02/12/23  1143   NA  --   --   --   --  134*  --  134*  --  130*   POTASSIUM  --   --   --   --  4.4  --  4.7  --  4.8   CHLORIDE  --   --   --   --  96*  --  93*  --  92*   CO2  --   --   --   --  26  --  30*  --  27   ANIONGAP  --   --   --   --  12  --  11  --  11   * 206* 175*   < > 223*   < > 151*   < > 328*   BUN  --   --   --   --  70.0*  --  79.4*  --  78.4*   CR  --   --   --   --  1.41*  --  1.68*  --  1.82*    GFRESTIMATED  --   --   --   --  46*  --  37*  --  33*   JUSTIN  --   --   --   --  9.0  --  9.1  --  9.2    < > = values in this interval not displayed.     CBC RESULTS: Recent Labs   Lab Test 02/12/23  0346 02/09/23  0659 02/08/23  0708   WBC 7.4 8.4 7.9   RBC 4.49 4.66 4.76   HGB 13.2 13.8 14.2   HCT 42.8 44.5 45.9   MCV 95 96 96   MCH 29.4 29.6 29.8   MCHC 30.8* 31.0* 30.9*   RDW 16.6* 16.4* 16.1*    331 314       Rehabilitation - continue comprehensive acute inpatient rehabilitation program with multidisciplinary approach including therapies, rehab nursing, and physiatry following. See interval history for updates.      ASSESSMENT AND PLAN  Pricilla Brown is a 48 year old female with a past medical history of chronic HFrEF 2/2 NICM, persistent atrial fibrillation (on Eliquis), asthma, diabetes mellitus type II, hypertension, obesity, hypothyroidism 2/2 radioablation for Graves disease, JYOTI, CKD, and recent admission for HF exacerbation (12/25/22-1/1/23) who was admitted on 1/23/23 with heart failure exacerbation with hospital course complicated by encephalopathy, hypercarbia, NIRAJ, hyperkalemia, labile blood glucose, UTI, dry gangrene of toes on right foot, and pain.  She is now admitted to ARU on 2/14/23 for multidisciplinary rehabilitation and ongoing medical management.        Admission to acute inpatient rehab 02/14/23.    Impairment group code: 09 Cardiac -  heart failure exacerbation        1. PT and OT 90 minutes of each on a daily basis, in addition to rehab nursing and close management of physiatrist.       2. Impairment of ADL's: Noted to have impaired activity tolerance, impaired balance, impaired ROM, impaired sensation, impaired strength and pain, all affecting her ability to safely and independently perform basic ADLs.  Goal for mod I with basic ADLs with assist for IADLs and bathing as PTA.     3. Impairment of mobility:  Noted to have impaired activity tolerance, impaired balance, impaired  ROM, impaired sensation, impaired strength and pain, all affecting her ability to safely and independently perform basic mobility.  Goal for mod I with basic mobility.     4. Medical Conditions  New actions/orders/updates for today are in blue.     Acute on Chronic Systolic Heart Failure (EF 40-45%)  Long-Standing Persistent Atrial Fibrllation  Hypertension   Troponin Elevation 2/2 CHF  [PTA meds: apixaban 5 mg BID, atorvastatin 40 mg daily, Bumex 5 mg BID, hydralazine 12.5 mg TID, hydrochlorothiazide 12.5 mg BID, isosorbide dinitrate 20 mg TID, Toprol XL 75 mg daily, spironolactone 50 mg daily]  Pt admitted with c/o increasing SOB, MURRAY, chest tightness, weight gain in spite of compliance with PO diuretics. NT BNP 5891 (was 2030 during most recent admission).  Lactic acid 2.2 on admission with improvement to 1.1 with diuresis. Troponin mildly elevated, 67-->75-->60 likely 2/2 CHF exacerbation; no concern for ACS currently. Most recent Echo with EF 40-45%. Coronary angiogram 2012 with no significant CAD. Of note, pt recently admitted 12/25-1/1 HF exacerbation, diuresed to 472lbs. Pt weight on admission 1/23 555lbs and down to 450lbs on discharge to ARU.  Wt Readings from Last 4 Encounters:   02/14/23 (!) 204.1 kg (450 lb)   01/01/23 (!) 214.4 kg (472 lb 9.6 oz)   10/31/22 (!) 201 kg (443 lb 3.2 oz)   10/26/22 (!) 208.6 kg (459 lb 14.4 oz)   - Continue Bumex 1 mg daily, with additional prn dose for weight gain   - Per cardiology, suspect EDW is around 445-450 lbs.  Monitor daily weights.  Call [facility] clinic if weight increases 5 lbs in a day two days in a row or 10 lbs in 1 week.  Bed weight this morning inaccurate, awaiting repeat.  Per discussion with hospitalist, did give an extra 1 mg dose of Bumex today.  Continue to monitor.  - Continue I/O, low Na diet, 2L fluid restriction   - BB: Continue Toprol XL 25mg daily  - ACEi/ARB/ARNI: Discontinued Lisinopril d/t allergy, if BP elevated, can trial Losartan    -  "SGLT2i: Contraindicated with recurrent yeast infections   - Aldosterone Antagonist: Discontinued Aldactone due to NIRAJ, hyperkalemia  - Rate Control: BB as above. Given mildly reduced EF, ok to use Diltiazem 120 mg BID  - Anticoagulation: CHADSVASC 3; Continue PTA apixaban  - Afterload reduction: Continue hydralazine 10 mg TID (hold if SBP <110), can give additional PRN dose if SBP >180  - [facility] consulted; follow up after ARU discharge.  Plan to discuss CardioMEMS at this appointment.  Currently scheduled on 2/24.     Chronic Kidney Disease Stage 3b  NIRAJ, improving  Longstanding history of diabetes and HTN. Baseline Cr ~1.2-1.4. Cr on admission 1.56. During hospitalization, patient diuresed to 427 lbs with NIRAJ, Cr peak 2.1. After backing off on diuretics, Cr improved to 1.4 on 2/14 at admission to ARU  - Avoid nephrotoxic agents  - Resume diuretics as listed above  - Trend BMP every M/Th     Hyperkalemia, resolved  Potassium continually rising since 2/9 with peak 2/11 @ 7.3.  EKG without acute changes, rhythm stable in a fib.  Nephrology consulted.  Potassium shifted with insulin, calcium gluconate for cardiac stability, lokelma 15 g X 1, IV Bumex total of 6 mg on 2/11/23.  Spironolactone discontinued.  - Low K diet  - I/O  - Continue to hold spironolactone, per nephrology can consider resuming once Cr stable  - Diuresis as above  - Trend BMP every M/Th     Hyponatremia  In setting of aggressive diuresis.  Now mild, improved to 134 on 2/14.  - Trend BMP every M/Th     Hypothyroidism 2/2 below  Graves Disease s/p Radioiodine Ablation  TSH 20.4 and free T4 0.82 on admission. Endocrine consulted.  - Continue levothyroxine 200 mcg M-Sat + 400 mcg on Sunday.    - Recheck TFT in a month     Type 2 Diabetes Mellitus, insulin-dependent, uncontrolled  Hyperglycemia  Diabetic neuropathy  A1c 13.5% on 12/25/2022. Discrepancies in patient's home regimen.  Per akshat, PTA on \"Toujeo 225 units daily, novolog 100 units with each meal " "+ CS, and ozempic 1 mg weekly. She could not tolerate empaglifozin due to frequent UTIs and metformin due to GI side effects.\" Endocrinology consulted during admission.  Insulin gtt started 2/1 per endocrinology, transitioned back to subcutaneous lantus and novolog on 2/3/23.   Recent Labs   Lab 02/15/23  0153 02/14/23  2206 02/14/23  1737 02/14/23  1217 02/14/23  0807 02/14/23  0715   * 206* 175* 187* 221* 223*   - Monitor BG TIC AC + HS + 0200  - Endocrinology continuing to follow, appreciate assistance.  Awaiting any updated recs on 2/15, but per yesterday:  - Continue Lantus 70 units qAM  - Continue Novolog very high intensity sliding scale insulin  - Continue Novolog 1:3g CHO at breakfast and lunch, 1:4g CHO at dinner  - Unable to resume PTA Ozepmic at ARU admission (not on formulary).  Can consider substitution with liraglutide if indicated.  - Hypoglycemia protocol  - OP follow up Thelma Archibald/Dr Silva Damon, scheduled on 3/17     Necrotic wounds of Right Toes 1, 3, and 4  Present on admission, pt unaware how long wounds have been present.  Vascular consulted; JOSETTE's completed showing patent vessels and normal waveforms. They recommend orthopedic foot and ankle consult.  Ortho consulted, felt consistent with dry gangrene, plan for wound care and follow-up as outpatient.   WOCN following as inpatient and provided wound care recs.  - WOCN consulted at ARU to continue to follow, appreciate assistance.  WOCN assessed toes today, debrided devitalized skin from tips of digits 1 and 4, intact skin beneath with no open wounds, no evidence of dry gangrene.  Did lose toenails on those digits.  - Wound cares updated per WOC recs: Bilateral toes and feet: Wash daily with warm water, soap and a washcloth, taking particular attention to wash well between and under toes. Dry thoroughly with a clean towel. Moisturize skin on bilateral feet and legs with Sween 24. Melfa missing toenail beds on great and 4th toes " "with betadine.  Also needs consistent foot cares.  - WBAT  - Follow up with podiatry as outpatient (rescheduled from 2/17 to 3/3 with Dr. Leahy given still anticipate at ARU)     Urinary Retention  Phan catheter placed on admission for strict I/O while aggressively diuresing. Removed 2/11, pt with urinary retention and straight cath x 3, Phan replaced. Urology curbside, continue phan for now, can re-attempt voiding trial as OP.  - Continue phan, consider TOV at ARU if indicated  - If ongoing retention or phan, follow up with OP Urology     Asthma  Patient endorsing mouth irritation w/ inhaler use. Per inpatient team, roof of mouth erythematous and appears excoriated but w/o evidence of yeast.   - Continue PTA inhalers (levalbuterol and spiriva)  - Magic mouthwash    Tobacco use disorder  - Continue nicotine patch 14 mcg, started during recent (December) admission     JYOTI  - Continue BiPAP at night    Hyperlipidemia   - Continue PTA Lipitor 40 mg daily     Chronic Pain  [PTA meds: Tylenol PRN, diclofenac QID PRN, gabapentin 600 mg TID, oxycodone 5 mg 5x/day]  Patient reporting diffuse joint pain while inpatient. Using narcotics along with gabapentin, topical nsaids, and muscle relaxants. Aggressive diuresis vs diabetic neuropathy.  Pain management luz consulted 1/31 while inpatient; decrease gabapentin to BID, stopped flexeril and added robaxin.  Pain improved at ARU admission.  - Continue PRN Tylenol, voltaren, capsaicin, robaxin, hydroxyzine   - Continue gabapentin 600 mg BID  - Continue Percocet 5-325 mg q8h PRN, wean as able  - Continue Topamax     Morbid obesity  Estimated body mass index is 70.48 kg/m  as calculated from the following:    Height as of 1/23/23: 1.702 m (5' 7\").    Weight as of an earlier encounter on 2/14/23: 204.1 kg (450 lb).  - Complicates rehab/cares        5. Adjustment to disability:  Clinical psychology to eval and treat if indicated  6. FEN: mod cons CHO, 2g NA, 2g K, 2L fluid " restriction  7. Bowel: continent, monitor, PRN bowel meds available  8. Bladder: phan in place at ARU as above  9. DVT Prophylaxis: apixaban  10. GI Prophylaxis: not indicated  11. Code: full  12. Disposition: goal for home  13. ELOS:  10-14 days  14. Follow up Appointments on Discharge: PCP in 1-2 weeks, cardiology ([facility], scheduled 2/24), endocrinology (Thelma Archibald/Dr Silva Damon on 3/17), urology, ortho/podiatry (3/3 with Dr. Leahy)        Patient discussed with Dr. Vladislav Vazquez, PM&R Staff Physician and FREDDY Brennan PA-C, PA-C  Physical Medicine & Rehabilitation

## 2023-02-15 NOTE — PROGRESS NOTES
[facility]  Diabetes Consult Daily Progress Note           Assessment and Plan:   Assessment:  Principal Problem:    Acute HFrEF (heart failure with reduced ejection fraction) (H)  Graves s/p ROGER now w/ postablative hypothyroidism  Diabetes Mellitus type 2    Plan:  - Diabetes Mellitus   - Continue to hold Ozempic 1mg daily due to  NIRAJ, NIRAJ better, creat=1.41   -   Lantus 70u daily   -  Continue Aspart 1:3u with breakfast and lunch and decrease to 1:4 for dinner.  Continue 1:4 for snacks   -  Aspart very high slide 1:10            - Hypoglycemia protocol            - diabetes education needs will be assessed closer to discharge            - Outpatient follow up: Thelma Archibald/Dr [facility]    _awaiting for ARU bed. Spoke with RN this morning has not heard if gong today          Last got dextrose and regular insulin at 00:00 for elevated K on 2/12.  Diuresis held and creat down to 1.4  Continue same lantus dose -->  BG higher this am as had a 74g carb meal at 3 am, no change today  When she eats breakfast or lunch bg are high and she got low after correction for dinner so increased carb coverage before dinner to 1:4             Significant Problems:     Past Medical History:   Diagnosis Date     A-fib (H) 2011    on coumadin since 1/13     Asthma     as a kid     Chest pain 2/1/2017     Chronic anticoagulation for a-fib 2/15/2013    INR's followed by coumadin clinic at      Diabetes mellitus (H) 2012     Diastolic heart failure 2/15/2013     Dilated cardiomyopathy (H) 1/8/2013     HTN (hypertension)      Hyperthyroidism     Graves, s/p I131 1/13, now on prednisone and methimazole     Mixed type age-related cataract, both eyes 4/8/2022     Morbid obesity (H)      Pulmonary embolism (H) 1/12    hospitalized in [place], on lovenox/coumadin for a few months but stopped, hypercoag w/u neg per pt     Sleep apnea     using CPAP              Review of Systems:     The Review of Systems is negative  other than noted in the HPI            Medications:     Current Facility-Administered Medications   Medication     acetaminophen (TYLENOL) tablet 650 mg     albuterol (PROVENTIL HFA/VENTOLIN HFA) inhaler     albuterol (PROVENTIL) neb solution 2.5 mg     apixaban ANTICOAGULANT (ELIQUIS) tablet 5 mg     [START ON 2/15/2023] atorvastatin (LIPITOR) tablet 40 mg     benzocaine-menthol (CHLORASEPTIC) 6-10 MG lozenge 1 lozenge     bumetanide (BUMEX) tablet 1 mg     [START ON 2/15/2023] bumetanide (BUMEX) tablet 1 mg     capsaicin (ZOSTRIX) 0.025 % cream 1 applicator     clotrimazole (LOTRIMIN) 1 % cream     glucose gel 15-30 g    Or     dextrose 50 % injection 25-50 mL    Or     glucagon injection 1 mg     diclofenac (VOLTAREN) 1 % topical gel 2 g     diltiazem ER (CARDIZEM SR) 12 hr capsule 120 mg     docusate sodium (COLACE) capsule 100 mg     gabapentin (NEURONTIN) capsule 600 mg     hydrALAZINE (APRESOLINE) tablet 10 mg     hydrALAZINE (APRESOLINE) tablet 10 mg     hydrOXYzine (ATARAX) tablet 25 mg    Or     hydrOXYzine (ATARAX) tablet 50 mg     [START ON 2/15/2023] insulin aspart (NovoLOG) injection (RAPID ACTING)     [START ON 2/15/2023] insulin aspart (NovoLOG) injection (RAPID ACTING)     insulin aspart (NovoLOG) injection (RAPID ACTING)     insulin aspart (NovoLOG) injection (RAPID ACTING)     insulin aspart (NovoLOG) injection (RAPID ACTING)     insulin aspart (NovoLOG) injection (RAPID ACTING)     [START ON 2/15/2023] insulin glargine (LANTUS PEN) injection 70 Units     [START ON 2/15/2023] levothyroxine (SYNTHROID/LEVOTHROID) tablet 200 mcg     [START ON 2/19/2023] levothyroxine (SYNTHROID/LEVOTHROID) tablet 400 mcg     methocarbamol (ROBAXIN) tablet 500 mg     [START ON 2/15/2023] metoprolol succinate ER (TOPROL XL) 24 hr tablet 50 mg     miconazole (MICATIN) 2 % cream     naloxone (NARCAN) injection 0.2 mg    Or     naloxone (NARCAN) injection 0.4 mg    Or     naloxone (NARCAN) injection 0.2 mg    Or      naloxone (NARCAN) injection 0.4 mg     [START ON 2/15/2023] nicotine (NICODERM CQ) 14 MG/24HR 24 hr patch 1 patch    And     nicotine Patch in Place     nystatin (MYCOSTATIN) cream     oxyCODONE-acetaminophen (PERCOCET) 5-325 MG per tablet 1 tablet     Patient is already receiving anticoagulation with heparin, enoxaparin (LOVENOX), warfarin (COUMADIN)  or other anticoagulant medication     polyethylene glycol (MIRALAX) Packet 17 g     simethicone (MYLICON) chewable tablet 80 mg     [START ON 2/15/2023] topiramate (TOPAMAX) tablet 50 mg     [START ON 2/15/2023] umeclidinium (INCRUSE ELLIPTA) 62.5 MCG/ACT inhaler 1 puff     Facility-Administered Medications Ordered in Other Encounters   Medication     sodium chloride (PF) 0.9% PF flush 10 mL            Physical Exam:   Blood pressure 108/69, pulse 86, temperature 99  F (37.2  C), temperature source Oral, resp. rate 18, SpO2 98 %, not currently breastfeeding.      Exam:  Constitutional:  alert, no acute distress, laying in bed, always very pleasant  ENT: visible goiter  Respiratory: nonlabored  Gastrointestinal: Abdomen soft, non-tender.  Musculoskeletal: extremities normal- no gross deformities noted,   Skin: no suspicious lesions or rashes-- She does have some scaling of her skin and peeling of her feet  RIght food/toe with black tissue-- seen by [facility]- sees podiatrist as outpatient--asked her when she gets out to make an appointment  Psychiatric: mentation appears normal, calm              Data:   All laboratory data reviewed     ROUTINE IP LABS (Last four results)  BMP  Recent Labs   Lab 02/14/23  1737 02/14/23  1217 02/14/23  0807 02/14/23  0715 02/13/23  0723 02/13/23  0647 02/12/23  1558 02/12/23  1143 02/12/23  0651 02/12/23  0544   NA  --   --   --  134*  --  134*  --  130*  --  130*   POTASSIUM  --   --   --  4.4  --  4.7  --  4.8  --  5.2   CHLORIDE  --   --   --  96*  --  93*  --  92*  --  92*   JUSTIN  --   --   --  9.0  --  9.1  --  9.2  --  9.4   CO2  --   --    --  26  --  30*  --  27  --  21*   BUN  --   --   --  70.0*  --  79.4*  --  78.4*  --  78.9*   CR  --   --   --  1.41*  --  1.68*  --  1.82*  --  1.85*   * 187* 221* 223*   < > 151*   < > 328*   < > 135*    < > = values in this interval not displayed.     CBC  Recent Labs   Lab 02/12/23  0346 02/09/23  0659 02/08/23  0708   WBC 7.4 8.4 7.9   RBC 4.49 4.66 4.76   HGB 13.2 13.8 14.2   HCT 42.8 44.5 45.9   MCV 95 96 96   MCH 29.4 29.6 29.8   MCHC 30.8* 31.0* 30.9*   RDW 16.6* 16.4* 16.1*    331 314     INRNo lab results found in last 7 days.      Jamilah Prater PA-C  Inpatient Diabetes Service  Pager   [phone]  2/14/2022    Contacting the Inpatient Diabetes Team   From 8AM-4PM: page inpatient diabetes provider that is following the patient, or utilize the job code paging system.   From 4PM-8AM: page the job code for endocrine fellow on call.        Please use the following job code to reach the Inpatient Diabetes team. Note that you must use an in house phone and that job codes cannot receive text pages.     Dial [phone] (or star-star-star [phone] on the new [employer] telephones), then [phone] to reach the endocrine-diabetes provider on call.      Review of prior external note(s) from - [facility] or Care Everywhere   35 minutes spent on the date of the encounter doing chart review, history and exam, documentation and further activities per the note     Over 50% of my time on the unit was spent counseling the patient and/or coordinating care regarding acute hyperglycemia management.  See note for details.

## 2023-02-15 NOTE — PHARMACY-ADMISSION MEDICATION HISTORY
Admission medication history completed at [facility]. Please see Pharmacy - Admission Medication History note from 1/24/2023.    Gabapentin, hydralazine, and metoprolol doses decreased from pta.  Lantus dose increased from pta.  Hydrochlorothiazide, isosorbide dinitrate, spironolactone stopped at discharge.  Ozempic held as non-formulary.    Martha Vanegas, ErnestoD, BCPS

## 2023-02-15 NOTE — PLAN OF CARE
FOCUS/GOAL  Bladder management, Nutrition/Feeding/Swallowing precautions, Medical management, and Skin integrity    ASSESSMENT, INTERVENTIONS AND CONTINUING PLAN FOR GOAL:  Vitas stable with soft BP at baseline. Compliant with  ml fluid intake this shift. BG stable, good appetite, 59 and 63 carbs consumed this shift. Correction and coverage given per MAR. Clarification needed for wound cares, seen by WOC, order in place. Soft BP. Air mattress to be removed per WOC. PM shift informed. Received Percocet and Robaxin with good effect. Will continue with POC.  Goal Outcome Evaluation:      Plan of Care Reviewed With: patient    Overall Patient Progress: no changeOverall Patient Progress: no change    Outcome Evaluation: New admit, ongoing evaluation.

## 2023-02-15 NOTE — PROGRESS NOTES
Brief Medicine Note    Follow up regarding diuresis, recent HF exacerbation, vitals, labs and trending of volume status.     Patient notes feeling well and has no complaints.  She feels her toes are looking improved and continues to have sensation and ability to move her toes and acknowledges that she may need to have toes amputated.      She is breathing without issues. She does not appreciate any increased puffiness or swelling.     Today's vital signs, medications, and nursing notes were reviewed. Labs reviewed     /70 (BP Location: Right arm)   Pulse 91   Temp 98  F (36.7  C) (Oral)   Resp 18   SpO2 96%   GENERAL: Alert and oriented. NAD. Sitting in recliner and able to sit forward. Cooperative.   HEENT: Anicteric sclera. NC. AT.   CV: RRR. S1, S2. No murmurs appreciated.   RESPIRATORY: Effort normal on RA. Lungs CTAB with no wheezing, rales, rhonchi.   GI: Abdomen soft, NABS  NEUROLOGICAL: No focal deficits. Moves all extremities.   EXTREMITIES: (see photos in medial for feet) black discoloration of toes with peeling of skin at PIP joins and proximally to the ball of the foot with onychomycosis of all toenails. Non-pitting peripheral edema. Intact bilateral pedal pulses.       A/P:  CHF exacerbation   Bed weight this morning 529lb however questionable accuracy, cannot account for blankets/devices/etc  - given low risk of additional diuresis, will opt to give additional 1mg Bumex   - repeat BMP in a.m.   - continue to trend weight and vitals  - continue PT and OT without restrictions    Necrotic toes  - continue acquaphor  - appreciate WOCN input and cares     June Loo PA-C  [facility]  Contact information available via [facility] Paging/Directory

## 2023-02-15 NOTE — TELEPHONE ENCOUNTER
Pt needs appt for retention, sending encounter per guidelines. Please review and follow-up with patient.    ----- Message -----   From: Sun Reina   Sent: 2/15/2023   7:36 AM CST   To: Maria Guadalupe Flores CMA   Subject: FW: Order for BRAXTON SALMERON                          Urology [facility] follow up appt needed      ----- Message -----   From: Chantal Peterson CNP   Sent: 2/14/2023  11:41 AM CST   To: p Hosp Discharge   Subject: Order for BRAXTON SALMERON                     Follow up with Urology in 3 weeks (after ARU discharge) for urinary retention follow up

## 2023-02-15 NOTE — PLAN OF CARE
Goal Outcome Evaluation:    Discharge Planner Post-Acute Rehab PT:     Discharge Plan: home with resumed PCA services, home PT    Precautions: falls    Current Status:  Bed Mobility: MIN A  Transfer: CGA with FWW  Gait: chair to chair close SBA ~3'  Stairs: NT  Balance: needs heavy UE support in standing    Assessment:  PLOF pt was mod I for limited household mobility, extensive PCA services for IADLs. Has had prolonged and complicated hospital staty, presents with generalized weakness and deconditioning. Currently below baseline and to benefit from skilled PT services to maximize independence with functional mobility for safe discharge home.     Other Barriers to Discharge (DME, Family Training, etc):   Stairs to enter home- pt [facility] if needed   Pt has 4WW, may need wc for community

## 2023-02-15 NOTE — PROGRESS NOTES
IP Diabetes Management  Daily Note           Assessment and Plan:   HPI: Pricilla Brown is a 48 year old female with a past medical history of chronic HFrEF 2/2 NICM, persistent atrial fibrillation (on Eliquis), asthma, diabetes mellitus type II, hypertension, obesity, hypothyroidism 2/2 radioablation for Graves disease, JYOTI, CKD, and recent admission for HF exacerbation (12/25/22-1/1/23) who was admitted on 1/23/23 with heart failure exacerbation with hospital course complicated by encephalopathy, hypercarbia, NIRAJ, hyperkalemia, labile blood glucose, UTI, dry gangrene of toes on right foot, and pain.  She is now admitted to ARU on 2/14/23 for rehabilitation.     Assessment:   1)Type II Diabetes Mellitus, uncontrolled (A1c 13.5%), with insulin resistance.    Plan:    -Lantus 70 units once daily AM   -Novolog reduced from 1:3g to 1:4g CHO with breakfast, and continue 1:4g CHO with all other meals and snacks/supplements.   -Novolog very high intensity sliding scale TID AC and HS   -BG monitoring TID AC, HS, 0200   -holding PTA Ozempic, due to NIRAJ. Once this resolves, we could resume (or substitute with Victoza while at rehab)   -hypoglycemia protocol   -carb counting protocol   -diabetes education needs will be assessed closer to discharge.     Outpatient follow up: with [facility]   Plan discussed with patient, bedside RN, and primary team via this note.        Interval History and Assessment: interval glucose trend reviewed:   Transferred to ARU yesterday.   >259 overnight. Suspect PO intake/missed CHO coverage. To 90's midday, which may be a result of increased activity at rehab. She had PT eval 8:45, and OT 10AM today.    Snacks at 10AM, 8PM added, to help Pricilla resist getting outside foods brought in. HS sliding scale order updated with request to move HS BG check and sliding scale to 2300, if she has her snack (sandwich). The 10aM snack (string cheese) not likely to have as great of an effect.  "    Unable to connect with pt x2 attempts.    Current nutritional intake and type: Orders Placed This Encounter      Combination Diet 2 gm K Diet; Moderate Consistent Carb (60 g CHO per Meal) Diet; 2 gm NA Diet      PTA Diabetes Regimen:   Tuojeo 40 unit(s) once daily at HS  Aspart 10 units with each meal + additional correction scale insulin as follows based on pre-meal glucose      -140 - 160: + 1 unit     -161 - 180: + 2 units     -181 - 200: + 3 units, and so on (sensitivity of 20 mg/dL)     Ozempic 1 mg weekly - she reports she did NOT resume as instructed     BG monitoring frequency:  3-4 times per day  BG trends at home: \"all over\" but mostly between 85 - 300      Historical:   Tuojeo (glargine U300) 225 units once daily  Novolog 100  Units with meals, plus 1/15 correction scale  Trulicity 1mg weekly on Mondays  Jardiance 25 mg daily AM - stopped 2/2 UTI's   (has not tolerated Metformin in the past)    Discharge Planning: TBD           Diabetes History:   Type of Diabetes: Type 2 Diabetes Mellitus  Lab Results   Component Value Date    A1C 13.5 12/25/2022    A1C >15.0 07/23/2021    A1C >14.0 08/19/2020    A1C 8.4 10/11/2018    A1C 9.6 05/29/2018    A1C 9.0 04/07/2017    A1C 12.1 03/10/2016              Review of Systems:     The Review of Systems is negative other than noted in the Interval History.           Medications:     Current Facility-Administered Medications   Medication     acetaminophen (TYLENOL) tablet 650 mg     albuterol (PROVENTIL HFA/VENTOLIN HFA) inhaler     albuterol (PROVENTIL) neb solution 2.5 mg     apixaban ANTICOAGULANT (ELIQUIS) tablet 5 mg     atorvastatin (LIPITOR) tablet 40 mg     benzocaine-menthol (CHLORASEPTIC) 6-10 MG lozenge 1 lozenge     bumetanide (BUMEX) tablet 1 mg     capsaicin (ZOSTRIX) 0.025 % cream 1 applicator     clotrimazole (LOTRIMIN) 1 % cream     glucose gel 15-30 g    Or     dextrose 50 % injection 25-50 mL    Or     glucagon injection 1 mg     diclofenac " (VOLTAREN) 1 % topical gel 2 g     diltiazem ER (CARDIZEM SR) 12 hr capsule 120 mg     docusate sodium (COLACE) capsule 100 mg     gabapentin (NEURONTIN) capsule 600 mg     hydrALAZINE (APRESOLINE) tablet 10 mg     hydrALAZINE (APRESOLINE) tablet 10 mg     hydrOXYzine (ATARAX) tablet 25 mg    Or     hydrOXYzine (ATARAX) tablet 50 mg     insulin aspart (NovoLOG) injection (RAPID ACTING)     insulin aspart (NovoLOG) injection (RAPID ACTING)     insulin aspart (NovoLOG) injection (RAPID ACTING)     insulin aspart (NovoLOG) injection (RAPID ACTING)     insulin aspart (NovoLOG) injection (RAPID ACTING)     insulin aspart (NovoLOG) injection (RAPID ACTING)     insulin glargine (LANTUS PEN) injection 70 Units     levothyroxine (SYNTHROID/LEVOTHROID) tablet 200 mcg     [START ON 2/19/2023] levothyroxine (SYNTHROID/LEVOTHROID) tablet 400 mcg     methocarbamol (ROBAXIN) tablet 500 mg     metoprolol succinate ER (TOPROL XL) 24 hr tablet 50 mg     miconazole (MICATIN) 2 % powder     mineral oil-hydrophilic petrolatum (AQUAPHOR)     naloxone (NARCAN) injection 0.2 mg    Or     naloxone (NARCAN) injection 0.4 mg    Or     naloxone (NARCAN) injection 0.2 mg    Or     naloxone (NARCAN) injection 0.4 mg     nicotine (NICODERM CQ) 14 MG/24HR 24 hr patch 1 patch    And     nicotine Patch in Place     oxyCODONE-acetaminophen (PERCOCET) 5-325 MG per tablet 1 tablet     Patient is already receiving anticoagulation with heparin, enoxaparin (LOVENOX), warfarin (COUMADIN)  or other anticoagulant medication     polyethylene glycol (MIRALAX) Packet 17 g     simethicone (MYLICON) chewable tablet 80 mg     [START ON 2/16/2023] topiramate (TOPAMAX) tablet 25 mg     umeclidinium (INCRUSE ELLIPTA) 62.5 MCG/ACT inhaler 1 puff     Facility-Administered Medications Ordered in Other Encounters   Medication     sodium chloride (PF) 0.9% PF flush 10 mL            Physical Exam:    /67 (BP Location: Right arm)   Pulse 85   Temp 97.1  F (36.2  C)  (Oral)   Resp 20   SpO2 98%   Chart review.             Data:     Recent Labs   Lab 02/15/23  1211 02/15/23  0744 02/15/23  0153 02/14/23  2206 02/14/23  1737 02/14/23  1217   GLC 90 259* 145* 206* 175* 187*     Lab Results   Component Value Date    WBC 7.4 02/12/2023    WBC 8.4 02/09/2023    WBC 7.9 02/08/2023    HGB 13.2 02/12/2023    HGB 13.8 02/09/2023    HGB 14.2 02/08/2023    HCT 42.8 02/12/2023    HCT 44.5 02/09/2023    HCT 45.9 02/08/2023    MCV 95 02/12/2023    MCV 96 02/09/2023    MCV 96 02/08/2023     02/12/2023     02/09/2023     02/08/2023     Lab Results   Component Value Date     (L) 02/14/2023     (L) 02/13/2023     (L) 02/12/2023    POTASSIUM 4.4 02/14/2023    POTASSIUM 4.7 02/13/2023    POTASSIUM 4.8 02/12/2023    CHLORIDE 96 (L) 02/14/2023    CHLORIDE 93 (L) 02/13/2023    CHLORIDE 92 (L) 02/12/2023    CO2 26 02/14/2023    CO2 30 (H) 02/13/2023    CO2 27 02/12/2023    GLC 90 02/15/2023     (H) 02/15/2023     (H) 02/15/2023     Lab Results   Component Value Date    BUN 70.0 (H) 02/14/2023    BUN 79.4 (H) 02/13/2023    BUN 78.4 (H) 02/12/2023     Lab Results   Component Value Date    TSH 20.40 (H) 01/23/2023    TSH 14.30 (H) 12/25/2022    TSH 6.79 (H) 09/09/2021     Lab Results   Component Value Date    AST 37 (H) 01/29/2023    AST  01/28/2023      Comment:      Unsatisfactory specimen - hemolyzed    Specimen is hemolyzed which can falsely elevate AST. Analysis of a non-hemolyzed specimen may result in a lower value.    AST 17 01/27/2023    ALT 11 01/29/2023    ALT 11 01/28/2023    ALT 6 (L) 01/27/2023    ALKPHOS 97 01/29/2023    ALKPHOS 103 01/28/2023    ALKPHOS 106 (H) 01/24/2023       25 minutes spent on the date of the encounter doing chart review, history, documentation and further activities per the note        Contacting the Inpatient Diabetes Team   From 8AM-4PM: page inpatient diabetes provider that is following the patient, or utilize  the job code paging system.   From 4PM-8AM: page the job code for endocrine fellow on call.       Please use the following job code to reach the Inpatient Diabetes team. Note that you must use an in house phone and that job codes cannot receive text pages.     Dial [phone] (or star-star-star [phone] on the new [employer] telephones), then [phone] to reach the endocrine-diabetes provider on call.    Jammie Chamorro PA-C  Inpatient Diabetes Management Service  Pager [phone]

## 2023-02-15 NOTE — PROGRESS NOTES
Infection Prevention Medical Director Note:     This patient previously resided on a unit that has demonstrated an increased incidence of hospital-onset COVID-19. The response to this increase includes COVID testing of patients on the same unit that are not known to have had COVID-19 in the last 90 days.   The baseline test for this patient was negative on 2/10, which prompts re-testing 5-7 days from baseline test. I have placed the COVID re-test.     Please reach out to me with any questions.     Karen Denis MD   of Medicine, Division of Infectious Diseases  Contact me on the [employer] valeriy or console  [facility] [phone]

## 2023-02-15 NOTE — PLAN OF CARE
FOCUS/GOAL  Medical management    ASSESSMENT, INTERVENTIONS AND CONTINUING PLAN FOR GOAL:  Patient here with acute exacerbation of CHF, alert and oriented, calls appropriately  Slept most of the night  Pain managed by PRN medications, had taken all the available PRN's at bedtime, no pain complained throughout the night.  BIPAP at night  Keenan catheter in placed, draining well, no BM this shift  Safety rounding checked completed, 3 side rails UP, bed alarm ON, call light in reach  Continue with POC.   Goal Outcome Evaluation:      Plan of Care Reviewed With: patient    Overall Patient Progress: no changeOverall Patient Progress: no change

## 2023-02-15 NOTE — PHARMACY-MEDICATION REGIMEN REVIEW
Pharmacy Medication Regimen Review  Pricilla Brown is a 48 year old female who is currently in the Acute Rehab Unit.    Assessment: Upon review of the medications and patient chart the following irregularities were found:     Significant Drug Interactions:  -diltiazem + oxycodone: diltiazem is a moderate inhibitor of CY which may increase effects of oxycodone, monitor for oxycodone side effects and consider careful dose titration if needed for pain control. Diltiazem is a new medication started in the hospital.    Other: consider adding hold parameters to diltiazem order    Plan:   Continue current medication regimen.  Monitor for oxycodone adverse effects with coadministration of diltiazem.  Consider adding hold parameters to diltiazem order    Attending provider will be sent this note for review.  If there are any emergent issues noted above, pharmacist will contact provider directly by phone.      Pharmacy will periodically review the resident's medication regimen for any PRN medications not administered in > 72 hours and discontinue them. The pharmacist will discuss gradual dose reductions of psychopharmacologic medications with interdisciplinary team on a regular basis.    Please contact pharmacy if the above does not answer specific medication questions/concerns.    Background:  A pharmacist has reviewed all medications and pertinent medical history today.  Medications were reviewed for appropriate use and any irregularities found are listed with recommendations.    Martha Vanegas, PharmD, BCPS    Current Facility-Administered Medications:      acetaminophen (TYLENOL) tablet 650 mg, 650 mg, Oral, TID PRN, June Loo PA-C, 650 mg at 23     albuterol (PROVENTIL HFA/VENTOLIN HFA) inhaler, 2 puff, Inhalation, Q6H PRN, June Loo PA-C     albuterol (PROVENTIL) neb solution 2.5 mg, 2.5 mg, Nebulization, Q6H PRN, June Loo PA-C     apixaban ANTICOAGULANT (ELIQUIS) tablet 5 mg, 5 mg,  Oral, BID, June Loo PA-C, 5 mg at 02/15/23 0834     atorvastatin (LIPITOR) tablet 40 mg, 40 mg, Oral, Daily, June Loo PA-C, 40 mg at 02/15/23 0834     benzocaine-menthol (CHLORASEPTIC) 6-10 MG lozenge 1 lozenge, 1 lozenge, Buccal, Q1H PRN, Jazz Harris MD, 1 lozenge at 02/15/23 0849     bumetanide (BUMEX) tablet 1 mg, 1 mg, Oral, Daily, June Loo PA-C, 1 mg at 02/15/23 0836     capsaicin (ZOSTRIX) 0.025 % cream 1 applicator, 1 applicator, Topical, TID PRN, June Loo PA-C     clotrimazole (LOTRIMIN) 1 % cream, , Topical, BID PRN, June Loo PA-C     glucose gel 15-30 g, 15-30 g, Oral, Q15 Min PRN **OR** dextrose 50 % injection 25-50 mL, 25-50 mL, Intravenous, Q15 Min PRN **OR** glucagon injection 1 mg, 1 mg, Subcutaneous, Q15 Min PRN, Lulu Guerrero PA-C     diclofenac (VOLTAREN) 1 % topical gel 2 g, 2 g, Topical, 4x Daily PRN, June Loo PA-C     diltiazem ER (CARDIZEM SR) 12 hr capsule 120 mg, 120 mg, Oral, BID, June Loo PA-C, 120 mg at 02/15/23 0834     docusate sodium (COLACE) capsule 100 mg, 100 mg, Oral, Daily PRN, June Loo PA-C     gabapentin (NEURONTIN) capsule 600 mg, 600 mg, Oral, BID, Lulu Guerrero PA-C, 600 mg at 02/15/23 0834     hydrALAZINE (APRESOLINE) tablet 10 mg, 10 mg, Oral, 4x Daily PRN, June Loo PA-C     hydrALAZINE (APRESOLINE) tablet 10 mg, 10 mg, Oral, TID, Lulu Guerrero PA-C     hydrOXYzine (ATARAX) tablet 25 mg, 25 mg, Oral, Q6H PRN **OR** hydrOXYzine (ATARAX) tablet 50 mg, 50 mg, Oral, Q6H PRN, Lulu Guerrero PA-C, 50 mg at 02/14/23 2000     insulin aspart (NovoLOG) injection (RAPID ACTING), , Subcutaneous, Daily with breakfast, Lulu Guerrero PA-C, 19 Units at 02/15/23 0838     insulin aspart (NovoLOG) injection (RAPID ACTING), , Subcutaneous, Daily with lunch, June Loo PA-C     insulin aspart (NovoLOG) injection (RAPID ACTING), , Subcutaneous, Daily with supper, Yolanda,  Lulu HAYNES PA-C, 23 Units at 02/14/23 1837     insulin aspart (NovoLOG) injection (RAPID ACTING), 1-22 Units, Subcutaneous, TID AC, Lulu Guerrero PA-C, 12 Units at 02/15/23 0837     insulin aspart (NovoLOG) injection (RAPID ACTING), 1-16 Units, Subcutaneous, At Bedtime, Lulu Guerrero PA-C, 1 Units at 02/14/23 2221     insulin aspart (NovoLOG) injection (RAPID ACTING), , Subcutaneous, With Snacks or Supplements, Jammie Chamorro PA-C     insulin glargine (LANTUS PEN) injection 70 Units, 70 Units, Subcutaneous, QAM AC, Lulu Guerrero PA-C, 70 Units at 02/15/23 0839     levothyroxine (SYNTHROID/LEVOTHROID) tablet 200 mcg, 200 mcg, Oral, Once per day on Mon Tue Wed Thu Fri Sat, Lulu Guerrero PA-C, 200 mcg at 02/15/23 0840     [START ON 2/19/2023] levothyroxine (SYNTHROID/LEVOTHROID) tablet 400 mcg, 400 mcg, Oral, Weekly, Lulu Guerrero PA-C     methocarbamol (ROBAXIN) tablet 500 mg, 500 mg, Oral, 4x Daily PRN, Lulu Guerrero PA-C, 500 mg at 02/14/23 1953     metoprolol succinate ER (TOPROL XL) 24 hr tablet 50 mg, 50 mg, Oral, Daily, June Loo PA-C, 50 mg at 02/15/23 0833     miconazole (MICATIN) 2 % cream, , Topical, BID, June Loo PA-C, Given at 02/14/23 1948     miconazole (MICATIN) 2 % powder, , Topical, BID, June Loo PA-C     mineral oil-hydrophilic petrolatum (AQUAPHOR), , Topical, Q1H PRN, June Loo PA-C     naloxone (NARCAN) injection 0.2 mg, 0.2 mg, Intravenous, Q2 Min PRN **OR** naloxone (NARCAN) injection 0.4 mg, 0.4 mg, Intravenous, Q2 Min PRN **OR** naloxone (NARCAN) injection 0.2 mg, 0.2 mg, Intramuscular, Q2 Min PRN **OR** naloxone (NARCAN) injection 0.4 mg, 0.4 mg, Intramuscular, Q2 Min PRN, Vladislav Vazquez,      nicotine (NICODERM CQ) 14 MG/24HR 24 hr patch 1 patch, 1 patch, Transdermal, Daily, 1 patch at 02/15/23 0835 **AND** nicotine Patch in Place, , Transdermal, Q8H OPHELIA, June Loo, PA-C     nystatin (MYCOSTATIN)  cream, , Topical, BID, June Loo PA-C, Given at 02/15/23 0842     oxyCODONE-acetaminophen (PERCOCET) 5-325 MG per tablet 1 tablet, 1 tablet, Oral, Q8H PRN, Jazz Harris MD, 1 tablet at 02/15/23 0959     Patient is already receiving anticoagulation with heparin, enoxaparin (LOVENOX), warfarin (COUMADIN)  or other anticoagulant medication, , Does not apply, Continuous PRN, Lulu Guerrero PA-C     polyethylene glycol (MIRALAX) Packet 17 g, 17 g, Oral, Daily PRN, June Loo PA-C     simethicone (MYLICON) chewable tablet 80 mg, 80 mg, Oral, Q6H PRN, Jazz Harris MD, 80 mg at 02/14/23 2112     [START ON 2/16/2023] topiramate (TOPAMAX) tablet 25 mg, 25 mg, Oral, Daily, June Loo PA-C     umeclidinium (INCRUSE ELLIPTA) 62.5 MCG/ACT inhaler 1 puff, 1 puff, Inhalation, Daily, Lulu Guerrero PA-C, 1 puff at 02/15/23 0843    Facility-Administered Medications Ordered in Other Encounters:      sodium chloride (PF) 0.9% PF flush 10 mL, 10 mL, Intravenous, Once, CanNii MD

## 2023-02-16 NOTE — PROGRESS NOTES
SW spoke with pt CADI CM, Flores Camejo (ph: [phone]) who was looking for update on discharge plan. SW reported tentative plan and that SW could f/u closer to discharge date. Flores reported that pt is currently receiving 10 hrs/day PCA, 4 hrs/day PM supervision, 4 hours/week IHS with training and 4 hours/week homemaking and PERS device. Flores's last week will be next week. Her colleague Marta will be pt's CADI CM after that (ph: [phone]).     Sari Yoon, [facility]   [facility]

## 2023-02-16 NOTE — PLAN OF CARE
Goal Outcome Evaluation: PRN percocet given x1 for generalized pain.  170mL given from 6431-3299 from nursing.  A of 2 with repositioning in bed.  Ate 100% of dinner.

## 2023-02-16 NOTE — PLAN OF CARE
Discharge Planner Post-Acute Rehab OT:     Discharge Plan: home with pca 10 hrs during the day and 4 at night    Precautions: falls    Current Status:  ADLs:    Mobility: min a transfers cga with fww to 22 inch height    Grooming:sba    Dressing: mod a u/b dep l/b    Bathing: pt able towash 8/10 areas assist with feet and buttocks. dep liko to rollling shower chair    Toileting:hygiene mod a, transfer cga to commode over toilet  IADLs:tba  Vision/Cognition: assess higher cog     Assessment: pt able towash 8/10 areas assist with feet and buttocks.increase trasnfers to cga with commmode over toilet and min a bed mobility eob to supine for legs  Other Barriers to Discharge (DME, Family Training, etc):closer to d/ discussed need for commode over toilet

## 2023-02-16 NOTE — PROGRESS NOTES
02/15/23 0875   Appointment Info   Signing Clinician's Name / Credentials (PT) Mari Landavid DPT   Living Environment   People in Home alone   Current Living Arrangements apartment   Home Accessibility stairs to enter home   Number of Stairs, Main Entrance 5   Stair Railings, Main Entrance railings on both sides of stairs   Transportation Anticipated family or friend will provide   Self-Care   Usual Activity Tolerance fair   Current Activity Tolerance poor   Equipment Currently Used at Home walker, rolling   Fall history within last six months yes   Number of times patient has fallen within last six months 2   Activity/Exercise/Self-Care Comment lives alone in apartment with 4 stairs to enter.10 hours of PCA services during day and 4 hours at night.  She required assistance for bathing and IADLs but reports was able to typically perform dressing and grooming/hygeine mod I. 10 hours of PCA services during day and 4 hours at night.  She required assistance for bathing and IADLs but reports was able to typically perform dressing and grooming/hygeine mod I.   General Information   Onset of Illness/Injury or Date of Surgery 01/23/23  (though previous admit 12/25)   Referring Physician Lulu Guerrero PA-C   Patient/Family Therapy Goals Statement (PT) to get home as quick as possible, Get home to my family   Pertinent History of Current Problem (include personal factors and/or comorbidities that impact the POC) CHF exacerbation with extensive complications. PMH of chronic HFrEF 2/2 NICM, persistent atrial fibrillation (on Eliquis), asthma, diabetes mellitus type II, hypertension, obesity, hypothyroidism 2/2 radioablation for Graves disease, JYOTI, CKD, and recent admission for HF exacerbation (12/25/22-1/1/23) who was admitted on 1/23/23 with heart failure exacerbation, underwent aggressive diuresis, with approximately 100-pound decrease in weight during her stay. Patient became progressively lethargic/obtunded  and was started on BiPAP during daytime on 1/27.  Patient was transferred to ICU on 1/28 for airway management in setting of encephalopathy with hypercarbia. other complications: hyperglycemia, NIRAJ on CKD and hyperkalemia, lactic acidosis, tachycardia, pain, UTI, urinary retention, and hyponatremia.   Existing Precautions/Restrictions fall   General Observations pt reports many family members that lean on her, she tries to support them as much as she can   Pain Assessment   Patient Currently in Pain Yes, see Vital Sign flowsheet   Integumentary/Edema   Integumentary/Edema Comments +1 in B feet   Range of Motion (ROM)   ROM Comment grossly limited d/t body habitus   Strength (Manual Muscle Testing)   Strength Comments L knee FLX 4/5, R 5/5, B knee EXT 5/5, B hip FLX 3/5, hip ABD/4/5. Toe EXT 5/5, toe intrinsics 4-/5,   Balance   Balance Comments needs heavy UE support in standing   Clinical Impression   Criteria for Skilled Therapeutic Intervention Yes, treatment indicated   PT Diagnosis (PT) generalized weakness, deconditioning   Influenced by the following impairments strength, balance, cariopulmonary function, activity tolerance   Functional limitations due to impairments bed mobility, transfers, gait, household and community mobility,   Clinical Presentation (PT Evaluation Complexity) Unstable/Unpredictable   Clinical Presentation Rationale meidcal complexity, acute on chronic deficits   Clinical Decision Making (Complexity) high complexity   Planned Therapy Interventions (PT) balance training;bed mobility training;gait training;groups;home exercise program;patient/family education;stair training;strengthening;stretching;wheelchair management/propulsion training;risk factor education;home program guidelines   Anticipated Equipment Needs at Discharge (PT) wheelchair   Risk & Benefits of therapy have been explained evaluation/treatment results reviewed;care plan/treatment goals reviewed;risks/benefits  reviewed;current/potential barriers reviewed;participants voiced agreement with care plan;participants included;patient   Clinical Impression Comments PLOF pt was mod I for limited household mobility, extensive PCA services for IADLs. Has had prolonged and complicated hospital staty, presents with generalized weakness and deconditioning. Currently below baseline and to benefit from skilled PT services to maximize independence with functional mobility for safe discharge home.   PT Total Evaluation Time   PT Eval, High Complexity Minutes (19325) 15   Physical Therapy Goals   PT Frequency Other (see comments)  ( minutes daily)   PT Predicted Duration/Target Date for Goal Attainment 02/28/23   PT Goals Bed Mobility;Transfers;Gait;Stairs;Aerobic Activity;PT Goal 1;PT Goal 2   PT: Bed Mobility Modified independent   PT: Transfers Modified independent;Assistive device   PT: Gait Assistive device;25 feet;Rolling walker  (for limited household mobility per PLOF)   PT: Stairs Minimal assist;4 stairs;Rail on both sides   PT: Perform aerobic activity with stable cardiovascular response NuStep;intermittent activity;15 minutes   PT: Goal 1 car transfer with MOD or < to access personal transportation   PT: Goal 2 pt to state 3 or > fall prevention strategies following education   Therapeutic Activity   Treatment Detail/Skilled Intervention PT; session largely focused on obtaining equipment needed for functional mobility, promote sitting OOB: wc, recliner with seat cusion, step stool to support feet while seated, FWW and 4WW. educated pt on PT role, POC   Total Session Time   Total Session Time (sum of timed and untimed services) 15   Post Acute Settings Only   What unit is patient on? Acute Rehab   PT - Acute Rehab Center Time   Individual Time (minutes) - enter zero if not applicable - PT 55  (15 EV 40 TA)   Group Time (minutes) - enter zero if not applicable  - PT 0   Concurrent Time (minutes) - enter zero if not  applicable  - PT 0   Co-Treatment Time (minutes) - enter zero if not applicable  - PT 0   ARC Total Session Time (minutes) - PT 55

## 2023-02-16 NOTE — PROGRESS NOTES
[facility]   Acute Rehabilitation Unit  Daily progress note    INTERVAL HISTORY  Pricilla Brown was seen and examined at bedside this morning.  No acute events reported overnight.  She reports that she was quite fatigued after first full day of therapies, but remains motivated to participate and to get home as soon as possible.  She did not sleep very well due to poor temperature regulation in her room.  Notes mild dizziness this morning upon first waking, BP was soft but improved on recheck.  Still having some diffuse muscle pains, as she has since presentation.  Describes as achy and can occur in various areas and wax/wane.  Pain meds are helping.  She reports BM last night.  Denies chest pain, palpitations, shortness of breath, cough, increased edema.    Functionally, she is needing min A for bed mobility, CGA for transfers with FWW, SBA for short gait chair to chair (~3').  She needs SBA for grooming, dependent with dressing.    MEDICATIONS    apixaban ANTICOAGULANT  5 mg Oral BID     atorvastatin  40 mg Oral Daily     bumetanide  1 mg Oral Daily     diltiazem ER  120 mg Oral BID     gabapentin  600 mg Oral BID     hydrALAZINE  10 mg Oral TID     insulin aspart   Subcutaneous Daily with breakfast     insulin aspart   Subcutaneous Daily with lunch     insulin aspart   Subcutaneous Daily with supper     insulin aspart  1-22 Units Subcutaneous TID AC     insulin aspart  1-16 Units Subcutaneous At Bedtime     [Held by provider] insulin glargine  70 Units Subcutaneous QAM AC     levothyroxine  200 mcg Oral Once per day on Mon Tue Wed Thu Fri Sat     [START ON 2/19/2023] levothyroxine  400 mcg Oral Weekly     metoprolol succinate ER  50 mg Oral Daily     miconazole   Topical BID     nicotine  1 patch Transdermal Daily    And     nicotine   Transdermal Q8H OPHELIA     topiramate  25 mg Oral Daily     umeclidinium  1 puff Inhalation Daily        acetaminophen, albuterol, albuterol,  sore throat, capsaicin, clotrimazole, glucose **OR** dextrose **OR** glucagon, diclofenac, docusate sodium, hydrALAZINE, hydrOXYzine **OR** hydrOXYzine, insulin aspart, methocarbamol, mineral oil-hydrophilic petrolatum, naloxone **OR** naloxone **OR** naloxone **OR** naloxone, oxyCODONE-acetaminophen, - MEDICATION INSTRUCTIONS -, polyethylene glycol, simethicone     PHYSICAL EXAM  /72   Pulse 93   Temp 99  F (37.2  C) (Oral)   Resp 16   Wt (!) 205 kg (452 lb)   SpO2 99%   BMI 70.79 kg/m     Gen: NAD, sitting up in chair  HEENT: NC/AT, MMM  Cardio: irregularly irregular, no murmurs appreciated  Pulm: non-labored on room air, lungs CTA bilaterally  Abd: obese, soft, non-tender, bowel sounds present  Ext: no pitting edema in bilat lower extremities  Neuro/MSK: awake, alert, PERRL, EOMI, face symmetric, moving all extremities in bed    LABS  Last Basic Metabolic Panel:  Recent Labs   Lab Test 02/16/23  0221 02/15/23  2150 02/15/23  1721 02/14/23  0807 02/14/23  0715 02/13/23  0723 02/13/23  0647 02/12/23  1558 02/12/23  1143   NA  --   --   --   --  134*  --  134*  --  130*   POTASSIUM  --   --   --   --  4.4  --  4.7  --  4.8   CHLORIDE  --   --   --   --  96*  --  93*  --  92*   CO2  --   --   --   --  26  --  30*  --  27   ANIONGAP  --   --   --   --  12  --  11  --  11   * 122* 87   < > 223*   < > 151*   < > 328*   BUN  --   --   --   --  70.0*  --  79.4*  --  78.4*   CR  --   --   --   --  1.41*  --  1.68*  --  1.82*   GFRESTIMATED  --   --   --   --  46*  --  37*  --  33*   JUSTIN  --   --   --   --  9.0  --  9.1  --  9.2    < > = values in this interval not displayed.     CBC RESULTS:   Recent Labs   Lab Test 02/12/23  0346 02/09/23  0659 02/08/23  0708   WBC 7.4 8.4 7.9   RBC 4.49 4.66 4.76   HGB 13.2 13.8 14.2   HCT 42.8 44.5 45.9   MCV 95 96 96   MCH 29.4 29.6 29.8   MCHC 30.8* 31.0* 30.9*   RDW 16.6* 16.4* 16.1*    331 314       Rehabilitation - continue comprehensive acute inpatient  rehabilitation program with multidisciplinary approach including therapies, rehab nursing, and physiatry following. See interval history for updates.      ASSESSMENT AND PLAN  Pricilla Brown is a 48 year old female with a past medical history of chronic HFrEF 2/2 NICM, persistent atrial fibrillation (on Eliquis), asthma, diabetes mellitus type II, hypertension, obesity, hypothyroidism 2/2 radioablation for Graves disease, JYOTI, CKD, and recent admission for HF exacerbation (12/25/22-1/1/23) who was admitted on 1/23/23 with heart failure exacerbation with hospital course complicated by encephalopathy, hypercarbia, NIRAJ, hyperkalemia, labile blood glucose, UTI, dry gangrene of toes on right foot, and pain.  She is now admitted to ARU on 2/14/23 for multidisciplinary rehabilitation and ongoing medical management.        Admission to acute inpatient rehab 02/14/23.    Impairment group code: 09 Cardiac -  heart failure exacerbation        1. PT and OT 90 minutes of each on a daily basis, in addition to rehab nursing and close management of physiatrist.       2. Impairment of ADL's: Noted to have impaired activity tolerance, impaired balance, impaired ROM, impaired sensation, impaired strength and pain, all affecting her ability to safely and independently perform basic ADLs.  Goal for mod I with basic ADLs with assist for IADLs and bathing as PTA.     3. Impairment of mobility:  Noted to have impaired activity tolerance, impaired balance, impaired ROM, impaired sensation, impaired strength and pain, all affecting her ability to safely and independently perform basic mobility.  Goal for mod I with basic mobility.     4. Medical Conditions  New actions/orders/updates for today are in blue.     Acute on Chronic Systolic Heart Failure (EF 40-45%)  Long-Standing Persistent Atrial Fibrllation  Hypertension   Troponin Elevation 2/2 CHF  [PTA meds: apixaban 5 mg BID, atorvastatin 40 mg daily, Bumex 5 mg BID, hydralazine 12.5 mg  TID, hydrochlorothiazide 12.5 mg BID, isosorbide dinitrate 20 mg TID, Toprol XL 75 mg daily, spironolactone 50 mg daily]  Pt admitted with c/o increasing SOB, MURRAY, chest tightness, weight gain in spite of compliance with PO diuretics. NT BNP 5891 (was 2030 during most recent admission).  Lactic acid 2.2 on admission with improvement to 1.1 with diuresis. Troponin mildly elevated, 67-->75-->60 likely 2/2 CHF exacerbation; no concern for ACS currently. Most recent Echo with EF 40-45%. Coronary angiogram 2012 with no significant CAD. Of note, pt recently admitted 12/25-1/1 HF exacerbation, diuresed to 472lbs. Pt weight on admission 1/23 555lbs and down to 450lbs on discharge to ARU.  Wt Readings from Last 4 Encounters:   02/16/23 (!) 205 kg (452 lb)   02/14/23 (!) 204.1 kg (450 lb)   01/01/23 (!) 214.4 kg (472 lb 9.6 oz)   10/31/22 (!) 201 kg (443 lb 3.2 oz)   - Continue Bumex 1 mg daily, with additional prn dose for weight gain   - Per cardiology, suspect EDW is around 445-450 lbs.  Monitor daily weights.  Call [facility] clinic if weight increases 5 lbs in a day two days in a row or 10 lbs in 1 week.  Weight up by 2 pounds today.  Defer changes in diuretic dosing to IM.  Did have soft/low BP this morning (80s/50s) with mild dizziness.  AM doses of hydralazine and metoprolol were held per parameters.  IM has reached out to cardiology to discuss any recommended dose adjustments.  BP improved on recheck, most recently 120s/80s.  Continue to monitor.  - Continue I/O, low Na diet, 2L fluid restriction   - BB: Continue Toprol XL 25mg daily  - ACEi/ARB/ARNI: Discontinued Lisinopril d/t allergy, if BP elevated, can trial Losartan    - SGLT2i: Contraindicated with recurrent yeast infections   - Aldosterone Antagonist: Discontinued Aldactone due to NIRAJ, hyperkalemia  - Rate Control: BB as above. Given mildly reduced EF, ok to use Diltiazem 120 mg BID  - Anticoagulation: CHADSVASC 3; Continue PTA apixaban  - Afterload reduction:  "Continue hydralazine 10 mg TID (hold if SBP <110), can give additional PRN dose if SBP >180  - [facility] consulted; follow up after ARU discharge.  Plan to discuss CardioMEMS at this appointment.  Currently scheduled on 2/24.     Chronic Kidney Disease Stage 3b  NIRAJ, improving  Longstanding history of diabetes and HTN. Baseline Cr ~1.2-1.4. Cr on admission 1.56. During hospitalization, patient diuresed to 427 lbs with NIRAJ, Cr peak 2.1. After backing off on diuretics, Cr improved to 1.4 on 2/14 at admission to ARU  - Avoid nephrotoxic agents  - Resume diuretics as listed above  - Trend BMP every M/Th.  Cr improving to 1.27 on 2/16     Hyperkalemia, resolved  Potassium continually rising since 2/9 with peak 2/11 @ 7.3.  EKG without acute changes, rhythm stable in a fib.  Nephrology consulted.  Potassium shifted with insulin, calcium gluconate for cardiac stability, lokelma 15 g X 1, IV Bumex total of 6 mg on 2/11/23.  Spironolactone discontinued.  - Low K diet  - I/O  - Continue to hold spironolactone, per nephrology can consider resuming once Cr stable  - Diuresis as above  - Trend BMP every M/Th.  2/16: K stable at 4.6     Hyponatremia  In setting of aggressive diuresis.  Now mild, improved to 134 on 2/14.  - Trend BMP every M/Th.  2/16: Na stable/low at 134.     Hypothyroidism 2/2 below  Graves Disease s/p Radioiodine Ablation  TSH 20.4 and free T4 0.82 on admission. Endocrine consulted.  - Continue levothyroxine 200 mcg M-Sat + 400 mcg on Sunday.    - Recheck TFT in a month     Type 2 Diabetes Mellitus, insulin-dependent, uncontrolled  Hyperglycemia  Diabetic neuropathy  A1c 13.5% on 12/25/2022. Discrepancies in patient's home regimen.  Per endo, PTA on \"Toujeo 225 units daily, novolog 100 units with each meal + CS, and ozempic 1 mg weekly. She could not tolerate empaglifozin due to frequent UTIs and metformin due to GI side effects.\" Endocrinology consulted during admission.  Insulin gtt started 2/1 per " endocrinology, transitioned back to subcutaneous lantus and novolog on 2/3/23.   Recent Labs   Lab 02/16/23  0221 02/15/23  2150 02/15/23  1721 02/15/23  1211 02/15/23  0744 02/15/23  0153   * 122* 87 90 259* 145*   - Monitor BG TIC AC + HS + 0200  - Endocrinology continuing to follow, appreciate assistance.  Per their recs on 2/15 (awaiting any updated recs on 2/16):  - Continue Lantus 70 units qAM  - Continue Novolog very high intensity sliding scale insulin  - Continue Novolog 1:3g CHO at breakfast and lunch, 1:4g CHO at dinner  - Unable to resume PTA Ozepmic at ARU admission (not on formulary; held due to recent NIRAJ).  Can consider substitution with liraglutide if indicated.  - Hypoglycemia protocol  - OP follow up Thelma Archibald/Dr Silva Damon, scheduled on 3/17     Necrotic wounds of Right Toes 1, 3, and 4, resolved  Present on admission, pt unaware how long wounds have been present.  Vascular consulted; JOSETTE's completed showing patent vessels and normal waveforms. They recommend orthopedic foot and ankle consult.  Ortho consulted, felt consistent with dry gangrene, plan for wound care and follow-up as outpatient.   WOCN following as inpatient and provided wound care recs.  - WOCN consulted at ARU to continue to follow, appreciate assistance.  WOCN assessed toes on 2/15, debrided devitalized skin from tips of digits 1 and 4, intact skin beneath with no open wounds, no evidence of dry gangrene.  Did lose toenails on those digits.  - Wound cares updated per WO recs: Bilateral toes and feet: Wash daily with warm water, soap and a washcloth, taking particular attention to wash well between and under toes. Dry thoroughly with a clean towel. Moisturize skin on bilateral feet and legs with Sween 24. [place]lett missing toenail beds on great and 4th toes with betadine.  Also needs consistent foot cares.  - WBAT  - Follow up with podiatry as outpatient (rescheduled from 2/17 to 3/3 with Dr. Tosin haro still  "anticipate at ARU)     Urinary Retention  Phan catheter placed on admission for strict I/O while aggressively diuresing. Removed 2/11, pt with urinary retention and straight cath x 3, Phan replaced. Urology curbside, continue phan for now, can re-attempt voiding trial as OP.  - Continue phan, consider TOV at ARU if indicated  - If ongoing retention or phan, follow up with OP Urology     Asthma  Patient endorsing mouth irritation w/ inhaler use. Per inpatient team, roof of mouth erythematous and appears excoriated but w/o evidence of yeast.   - Continue PTA inhalers (levalbuterol and spiriva)  - Magic mouthwash    Tobacco use disorder  - Continue nicotine patch 14 mcg, started during recent (December) admission     JYOTI  - Continue BiPAP at night    Hyperlipidemia   - Continue PTA Lipitor 40 mg daily     Chronic Pain  [PTA meds: Tylenol PRN, diclofenac QID PRN, gabapentin 600 mg TID, oxycodone 5 mg 5x/day]  Patient reporting diffuse joint pain while inpatient. Using narcotics along with gabapentin, topical nsaids, and muscle relaxants. Aggressive diuresis vs diabetic neuropathy.  Pain management luz consulted 1/31 while inpatient; decrease gabapentin to BID, stopped flexeril and added robaxin.  Pain improved at ARU admission.  - Continue PRN Tylenol, voltaren, capsaicin, robaxin, hydroxyzine   - Continue gabapentin 600 mg BID  - Continue Percocet 5-325 mg q8h PRN, wean as able  - Continue Topamax     Morbid obesity  Estimated body mass index is 70.79 kg/m  as calculated from the following:    Height as of 1/23/23: 1.702 m (5' 7\").    Weight as of this encounter: 205 kg (452 lb).  - Complicates rehab/cares    Possible COVID-19 exposure  Per ID, patient was previously on unit with high prevalence of COVID-19.  Tested negative on 2/15.    - Infection prevention recommending repeat testing to confirm negative at 5-7 days.  Ordered on 2/16 by ID provider; patient declining despite nursing education  - Can discuss " further with patient tomorrow  - Currently afebrile, no signs/symptoms of COVID infection        5. Adjustment to disability:  Clinical psychology to eval and treat if indicated  6. FEN: mod cons CHO, 2g NA, 2g K, 2L fluid restriction  7. Bowel: continent, monitor, PRN bowel meds available  8. Bladder: phan in place at ARU as above  9. DVT Prophylaxis: apixaban  10. GI Prophylaxis: not indicated  11. Code: full  12. Disposition: goal for home  13. ELOS:  10-14 days  14. Follow up Appointments on Discharge: PCP in 1-2 weeks, cardiology ([facility], scheduled 2/24), endocrinology (Thelma Archibald/Dr Silva Damon on 3/17), urology, ortho/podiatry (3/3 with Dr. Leahy)        Patient discussed with Dr. Vladislav Vazquez, PM&R Staff Physician and FREDDY Brennan PA-C, PA-C  Physical Medicine & Rehabilitation

## 2023-02-16 NOTE — PLAN OF CARE
FOCUS/GOAL  Medical management    ASSESSMENT, INTERVENTIONS AND CONTINUING PLAN FOR GOAL:  Patient alert and oriented, able to make needs known, calls appropriately  Slept most of the night  Wears the BIPAP at bedtime, request to take it OFF @ 2am, , replaced O2 @2L the rest of the night  Had taken PRN pain medications at bedtime, no PRN given overnight, able to sleep well throughout the night  Keenan catheter in placed, draining well, No BM this shift  Safety rounding checked completed, 3 side rails UP, bed alarm ON, call light in reach  Continue with POC.   Goal Outcome Evaluation:

## 2023-02-16 NOTE — PLAN OF CARE
FOCUS/GOAL  Medical management    ASSESSMENT, INTERVENTIONS AND CONTINUING PLAN FOR GOAL:  Soft BP at the beginning of the shift. BP meds held. BP improved at the end of the shift. BG stable received coverage per MAR. Lantus dose decreased. Had a shower this morning. Keenan patent drained alise yellow urine. No BM this shift. Good appetite, patient was compliant with FR. 240 ml fluid intake this shift. Declined COVID test. Provider informed.Will continue with POC.  Goal Outcome Evaluation:      Plan of Care Reviewed With: patient    Overall Patient Progress: no changeOverall Patient Progress: no change    Outcome Evaluation: BG stable, insulin updated by diabetic team. Up in chair most of time this shift.

## 2023-02-16 NOTE — PLAN OF CARE
[id number]   No changes this shift  AOx4. Denies CP, SOB, N/T  BiPAP placed for overnight  Keenan with adequate output. LBM 2/15/23  Pt c/o generalized pain; given tylenol and atarax. Pt repositioned.   Dressings CDI  Call light within reach at all times. Pt able to make needs known. Bed alarm on for safety.   Continue with POC.

## 2023-02-16 NOTE — PROGRESS NOTES
IP Diabetes Management  Daily Note           Assessment and Plan:   HPI: Pricilla Brown is a 48 year old female with a past medical history of chronic HFrEF 2/2 NICM, persistent atrial fibrillation (on Eliquis), asthma, diabetes mellitus type II, hypertension, obesity, hypothyroidism 2/2 radioablation for Graves disease, JYOTI, CKD, and recent admission for HF exacerbation (12/25/22-1/1/23) who was admitted on 1/23/23 with heart failure exacerbation with hospital course complicated by encephalopathy, hypercarbia, NIRAJ, hyperkalemia, labile blood glucose, UTI, dry gangrene of toes on right foot, and pain.  She is now admitted to ARU on 2/14/23 for rehabilitation.     Assessment:   1)Type II Diabetes Mellitus, uncontrolled (A1c 13.5%), with insulin resistance.    Plan:    -Reduced Lantus 65 units once daily AM   -Novolog  1:4g CHO with breakfast, lunch, dinner and snacks/supplements.   -Changed Novolog very high intensity sliding scale TID AC and HS to 2/30 sliding scale    -BG monitoring TID AC, HS, 0200   -holding PTA Ozempic, due to NIRAJ. Once this resolves, we could resume (or substitute with Victoza while at rehab)   -hypoglycemia protocol   -carb counting protocol   -diabetes education needs will be assessed closer to discharge.     Outpatient follow up: with [facility]   Plan discussed with patient, bedside RN, and primary team via this note.        Interval History and Assessment: interval glucose trend reviewed:   Called Pricilla on the phone this am.  She is working hard. She had quite a bit of activity and her BG running lower yesterday but 176 this am.  She says she had 2 uncovered popcicles because she was hot overnight. Will decrease lantus to 65 units today for BG  and decrease sliding scale to 2/30.      Per previous note:  Snacks at 10AM, 8PM added on 2/15, to help Pricilla resist getting outside foods brought in. HS sliding scale order updated with request to move HS BG check and sliding scale  "to 2300, if she has her snack (sandwich).          Current nutritional intake and type: Orders Placed This Encounter      Combination Diet 2 gm K Diet; Moderate Consistent Carb (60 g CHO per Meal) Diet; 2 gm NA Diet      PTA Diabetes Regimen:   Tuojeo 40 unit(s) once daily at HS  Aspart 10 units with each meal + additional correction scale insulin as follows based on pre-meal glucose      -140 - 160: + 1 unit     -161 - 180: + 2 units     -181 - 200: + 3 units, and so on (sensitivity of 20 mg/dL)     Ozempic 1 mg weekly - she reports she did NOT resume as instructed     BG monitoring frequency:  3-4 times per day  BG trends at home: \"all over\" but mostly between 85 - 300      Historical:   Tuojeo (glargine U300) 225 units once daily  Novolog 100  Units with meals, plus 1/15 correction scale  Trulicity 1mg weekly on Mondays  Jardiance 25 mg daily AM - stopped 2/2 UTI's   (has not tolerated Metformin in the past)    Discharge Planning: TBD           Diabetes History:   Type of Diabetes: Type 2 Diabetes Mellitus  Lab Results   Component Value Date    A1C 13.5 12/25/2022    A1C >15.0 07/23/2021    A1C >14.0 08/19/2020    A1C 8.4 10/11/2018    A1C 9.6 05/29/2018    A1C 9.0 04/07/2017    A1C 12.1 03/10/2016              Review of Systems:     The Review of Systems is negative other than noted in the Interval History.           Medications:     Current Facility-Administered Medications   Medication     acetaminophen (TYLENOL) tablet 650 mg     albuterol (PROVENTIL HFA/VENTOLIN HFA) inhaler     albuterol (PROVENTIL) neb solution 2.5 mg     apixaban ANTICOAGULANT (ELIQUIS) tablet 5 mg     atorvastatin (LIPITOR) tablet 40 mg     benzocaine-menthol (CHLORASEPTIC) 6-10 MG lozenge 1 lozenge     [Held by provider] bumetanide (BUMEX) tablet 1 mg     capsaicin (ZOSTRIX) 0.025 % cream 1 applicator     clotrimazole (LOTRIMIN) 1 % cream     glucose gel 15-30 g    Or     dextrose 50 % injection 25-50 mL    Or     glucagon injection 1 " mg     diclofenac (VOLTAREN) 1 % topical gel 2 g     diltiazem ER (CARDIZEM SR) 12 hr capsule 120 mg     docusate sodium (COLACE) capsule 100 mg     gabapentin (NEURONTIN) capsule 600 mg     hydrALAZINE (APRESOLINE) tablet 10 mg     hydrALAZINE (APRESOLINE) tablet 10 mg     hydrOXYzine (ATARAX) tablet 25 mg    Or     hydrOXYzine (ATARAX) tablet 50 mg     insulin aspart (NovoLOG) injection (RAPID ACTING)     insulin aspart (NovoLOG) injection (RAPID ACTING)     insulin aspart (NovoLOG) injection (RAPID ACTING)     insulin aspart (NovoLOG) injection (RAPID ACTING)     insulin aspart (NovoLOG) injection (RAPID ACTING)     insulin aspart (NovoLOG) injection (RAPID ACTING)     insulin glargine (LANTUS PEN) injection 65 Units     levothyroxine (SYNTHROID/LEVOTHROID) tablet 200 mcg     [START ON 2/19/2023] levothyroxine (SYNTHROID/LEVOTHROID) tablet 400 mcg     methocarbamol (ROBAXIN) tablet 500 mg     metoprolol succinate ER (TOPROL XL) 24 hr tablet 50 mg     miconazole (MICATIN) 2 % powder     mineral oil-hydrophilic petrolatum (AQUAPHOR)     naloxone (NARCAN) injection 0.2 mg    Or     naloxone (NARCAN) injection 0.4 mg    Or     naloxone (NARCAN) injection 0.2 mg    Or     naloxone (NARCAN) injection 0.4 mg     nicotine (NICODERM CQ) 14 MG/24HR 24 hr patch 1 patch    And     nicotine Patch in Place     oxyCODONE-acetaminophen (PERCOCET) 5-325 MG per tablet 1 tablet     Patient is already receiving anticoagulation with heparin, enoxaparin (LOVENOX), warfarin (COUMADIN)  or other anticoagulant medication     polyethylene glycol (MIRALAX) Packet 17 g     simethicone (MYLICON) chewable tablet 80 mg     topiramate (TOPAMAX) tablet 25 mg     umeclidinium (INCRUSE ELLIPTA) 62.5 MCG/ACT inhaler 1 puff     Facility-Administered Medications Ordered in Other Encounters   Medication     sodium chloride (PF) 0.9% PF flush 10 mL            Physical Exam:    /85   Pulse 79   Temp (!) 96.2  F (35.7  C) (Oral)   Resp 16   Wt  (!) 205 kg (452 lb)   SpO2 99%   BMI 70.79 kg/m    Spoke with Pricilla over the phone  Constitutional:  alert, no acute distress, always very pleasant    Respiratory: nonlabored, no cough on the phone    Psychiatric: mentation appears normal, calm          Data:     Recent Labs   Lab 02/16/23  1253 02/16/23  1153 02/16/23  0806 02/16/23  0221 02/15/23  2150 02/15/23  1721   * 181* 176* 107* 122* 87     ROUTINE IP LABS (Last four results)  BMPRecent Labs   Lab 02/16/23  1253 02/16/23  1153 02/16/23  0806 02/16/23  0221 02/14/23  0807 02/14/23  0715 02/13/23  0723 02/13/23  0647 02/12/23  1558 02/12/23  1143   *  --   --   --   --  134*  --  134*  --  130*   POTASSIUM 4.6  --   --   --   --  4.4  --  4.7  --  4.8   CHLORIDE 96*  --   --   --   --  96*  --  93*  --  92*   JUSTIN 9.5  --   --   --   --  9.0  --  9.1  --  9.2   CO2 30*  --   --   --   --  26  --  30*  --  27   BUN 51.9*  --   --   --   --  70.0*  --  79.4*  --  78.4*   CR 1.27*  --   --   --   --  1.41*  --  1.68*  --  1.82*   * 181* 176* 107*   < > 223*   < > 151*   < > 328*    < > = values in this interval not displayed.     CBC  Recent Labs   Lab 02/16/23  1253 02/12/23  0346   WBC 7.1 7.4   RBC 4.69 4.49   HGB 13.8 13.2   HCT 44.8 42.8   MCV 96 95   MCH 29.4 29.4   MCHC 30.8* 30.8*   RDW 16.4* 16.6*    346     INRNo lab results found in last 7 days.  Lab Results   Component Value Date    AST 37 01/29/2023    AST 13 08/19/2020     Lab Results   Component Value Date    ALT 11 01/29/2023    ALT 30 08/19/2020     Lab Results   Component Value Date    BILICONJ 0.0 10/17/2013      Lab Results   Component Value Date    BILITOTAL 1.4 01/29/2023    BILITOTAL 0.5 08/19/2020     Lab Results   Component Value Date    ALBUMIN 3.1 02/11/2023    ALBUMIN 2.8 06/15/2022    ALBUMIN 2.9 08/19/2020     Lab Results   Component Value Date    PROTTOTAL 7.5 01/29/2023    PROTTOTAL 8.0 08/19/2020      Lab Results   Component Value Date    ALKPHOS 97  01/29/2023    ALKPHOS 73 08/19/2020       35 minutes spent on the date of the encounter doing chart review, history, documentation and further activities per the note        Contacting the Inpatient Diabetes Team   From 8AM-4PM: page inpatient diabetes provider that is following the patient, or utilize the job code paging system.   From 4PM-8AM: page the job code for endocrine fellow on call.       Please use the following job code to reach the Inpatient Diabetes team. Note that you must use an in house phone and that job codes cannot receive text pages.     Dial [phone] (or star-star-star [phone] on the new mAPPn telephones), then [phone] to reach the endocrine-diabetes provider on call.  Jamilah Prater PA-C  Inpatient Diabetes Service  Pager   [phone]  2/16/2023

## 2023-02-16 NOTE — PROGRESS NOTES
Brief Medicine Note  Follow up regarding diuresis, recent HF exacerbation, vitals, labs and trending of volume status.     Soft bp this morning with MAP >65.  Patient feeling tired today, including on follow up. She is breathing without issues. She does not appreciate any increased puffiness or swelling.     Today's vital signs, medications, and nursing notes were reviewed. Labs reviewed     /85   Pulse 79   Temp (!) 96.2  F (35.7  C) (Oral)   Resp 16   Wt (!) 205 kg (452 lb)   SpO2 99%   BMI 70.79 kg/m    GENERAL: Alert and oriented. NAD. Sitting in recliner and able to sit forward. Cooperative.   HEENT: Anicteric sclera. NC. AT.   CV: RRR. S1, S2. No murmurs appreciated.   RESPIRATORY: Effort normal on RA. Lungs CTAB with no wheezing, rales, rhonchi.   GI: Abdomen soft, NABS  NEUROLOGICAL: No focal deficits. Moves all extremities.   Extremities: + anasarca       A/P:  Fatigue  Soft bp  Other vitals are stable and no overt signs of infection  - culture if any T>100F (blood, and urine)    CHF exacerbation   Bed weight this morning 452lb however questionable accuracy. Got extra dose bumex yesterday.  -450lb.   - hold bumex today  - hold parameters for diltiazem, metoprolol and hydralazine  - repeat BMP in a.m.   - continue to trend weight and vitals  - continue PT and OT without restrictions    Acute on chronic Kidney Disease Stage 3b, improving  Hyperkalemia, resolved  K normal and Cr downtrending  - continue to trend I&O, weight, BMP    Type 2 Diabetes Mellitus on longterm insulin c/b peripheral neuopathy (A1C 13.5 on 12/25/22)  Morbid obesity  Recent Labs   Lab 02/16/23  1253 02/16/23  1153 02/16/23  0806 02/16/23  0221 02/15/23  2150 02/15/23  1721   * 181* 176* 107* 122* 87     - continue current regiment as per diabetes recommendations    Pseudophakia of bilateral eyes  Conjunctival hyperemia both eyes  - continue prednisolone  - will need ophtho folow up on discharge    June MAO  SHANE Loo  [facility]  Contact information available via [facility] Paging/Directory

## 2023-02-16 NOTE — PLAN OF CARE
Discharge Planner Post-Acute Rehab PT:      Discharge Plan: Home (apt) 5 OCTAVIO with bilat rails, with resumed PCA services, home PT     Precautions: falls     Current Status:  Bed Mobility: SBA rolling L/R  Transfer: CGA/SBA with FWW  Gait: up to 20 ft with FWW, SBA  Stairs: NT  Balance: needs heavy UE support in standing     Assessment:  Coordinating with OT for optimal equipment and room set-up. SBA for STS from both bedside chairs, and now able to safely get in/out of bed without pulsatile mattress. Able to increase ambulation distance to 3x20 ft bouts in PM session. With good insight into greatest barrier being stairs to enter apt.    Other Barriers to Discharge (DME, Family Training, etc):   Stairs to enter home- pt [facility] at [facility]/o [facility] if needed   Pt has 4WW

## 2023-02-17 NOTE — PROGRESS NOTES
Orders updated    Bilateral toes and feet: Wash daily with warm water, soap and a washcloth, taking particular attention to wash well between and under toes. Dry thoroughly with a clean towel. Moisturize skin on bilateral feet and legs with Sween 24. [place] missing toenail beds on great and 4th toes with betadine. Okay to place mepilex over area once betadine has air dried.     Gauze sticking to wound.

## 2023-02-17 NOTE — PLAN OF CARE
FOCUS/GOAL  Medical management    ASSESSMENT, INTERVENTIONS AND CONTINUING PLAN FOR GOAL:  Patient here with Acute exacerbation of CHF, alert able to make needs known  Calls appropriately  Slept most of the night, on O2 @ 2L at night, refused to wear the BIPAP  No complained of pain, headache, chest pain, N&V, no SOB  Repositioned as needed at night  Keenan catheter pulled OFF at the end of the shift as ordered, no BM this shift  Safety rounding checked completed, 3 side rails UP, bed alarm ON, call light in reach  Continue with POC.   Goal Outcome Evaluation:

## 2023-02-17 NOTE — PLAN OF CARE
FOCUS/GOAL  Pain management and Medical management    ASSESSMENT, INTERVENTIONS AND CONTINUING PLAN FOR GOAL:  PRN pain  received three times this shift ( Percocet, Robaxin Tylenol along with atarax), she is lying down ready to sleep. Desire not to be disturbed during NOC. Good appetite. Supper insulin coverage given  at HS. Keenan to be removed tomorrow morning. NOC Nurse informed. Patent drained clear yellow urine. No BM this shift. Will continue with POC.  Goal Outcome Evaluation:      Plan of Care Reviewed With: patient    Overall Patient Progress: no changeOverall Patient Progress: no change    Outcome Evaluation: No change this shift, received PRN pain medication 3x this shift.

## 2023-02-17 NOTE — PROGRESS NOTES
[facility]    Medicine Progress Note - Hospitalist Service    Date of Admission:  2/14/2023    Updates today:  - significant fatigue and bilateral leg pain radiating from back  - XR thoracic and lumbar spine are ordered and pending  - resume bumex  - ok to hold off on prednisolone eye drops, will need outpt ophtho follow up   - agree with local patches and gels for back and leg pain and pain regiment as per primary  - agree with phan removal, monitor I&O as best able    Assessment & Plan   Pricilla Brown is a 48 year old female w/ PMH HFmrEF (EF 40-45%) 2/2 NICM, long-standing persistent AF, HTN, type II DM, morbid obesity, asthma, JYOTI and Graves disease s/p radioiodine ablation c/b hypothyroid  admitted on 1/23/2023 to [facility]. She is admitted to ARU on 2/14/23.     Acute on Chronic Systolic Heart Failure (EF 40-45%)  Long-Standing Persistent Atrial Fibrllation  Hypertension   Troponin Elevation 2/2 CHF, resolved  Pt admitted with c/o increasing SOB, MURRAY, chest tightness, weight gain in spite of compliance with PO diuretics. NT BNP 5891 (was 2030 during most recent admission).  Lactic acid 2.2 on admission with improvement to 1.1 with diuresis. Troponin mildly elevated, 67-->75-->60 likely 2/2 CHF exacerbation; no concern for ACS currently. Most recent Echo with EF 40-45%. Coronary angiogram 2012 with no significant CAD. Of note, pt recently admitted 12/25-1/1 HF exacerbation, diuresed to 472lbs. Pt weight on admission 1/23 555lbs.   - Volume Status: Euvolemic  - Diuresis: continue to hold PO Bumex 1 mg daily              - New EDW is 430 lbs, goal weigh 445-450lb    - Beta Blocker: Continue Toprol XL to 50mg  - ACEi/ARB/ARNI: contraindicated d/t lisinopril allergy.   - SGLT2i: Contraindicated with recurrent yeast infections   - Aldosterone Antagonist: Hold/discontinue PTA Spironolactone 50mg d/t hyperkalemia  - Rate Control: BB as  above. Cont diltiazem 120 BID due to persistent tachycardia and borderline reduced EF.   - Afterload reduction: Cont hydralazine 10mg PO TID (hold parameters SBP <110), can give additional prn if sbp >180  - Anticoagulation: CHADSVASC 3; Continue PTA apixaban  - Strict I/O & Daily Weights  - Low Na Diet / 2L Fluid Restriction  - BMP M Th and as needed if new concerns  - [facility] clinic to continue to follow   - Cardiology follow up 2/24     Acute on chronic Kidney Disease Stage 3b  Hyperkalemia  Longstanding history of diabetes and HTN. Baseline Creatinine appears to be around 1.2-1.4. Elevated until recently prior to discharge in the setting of aggressive diuresis. Creatinine on ARU admission 1.4. K up to 7.3 (2/9) w/o EKG changes. Nephrology consulted inpatient.   - trend BMP  - Hold spironolactone (high K)  - bumex 1mg daily and 1mg additional prn weight gain >5lb or >10lb in 1 week  - Avoid nephrotoxic agents  - EKG prn if hyperkalelmia recurs      Graves Disease s/p Radioiodine Ablation  Hypothyroidism 2/2 Radioiodine Ablation  TSH 20.4 and free T4 0.82 on admission. Endocrine followed during inpatient admission and updated levothyroxine dosing.   - Endocrine consulted; recommendations below   - Continue levothyroxine 200 mcg M-Sat- 400 mcg on Sunday.    - Recheck TFT in a month (March 2023)     Type 2 Diabetes Mellitus on longterm insulin c/b peripheral neuopathy (A1C 13.5 on 12/25/22)  Morbid obesity  Discrepancies in patient's home regimen. On insulin drip briefly while inpatient. Endocrinology consulted during inpatient admission. Glucose trend as follows  Recent Labs   Lab 02/17/23  1230 02/17/23  0741 02/17/23  0219 02/16/23  2135 02/16/23  1656 02/16/23  1253   * 121* 73 124* 150* 159*     - endocrinology following, appreciate recs     Right Toe Wound, necrotic toes  Present on admission, right great toe and 3rd toe, dry gangrene, pt unaware how long wounds have been present. Vascular, podiatry and  orthopedics consulted inpatient. ABIs with normal waveforms.  - WOCN: wound cares to right toe daily. Paint toes with betadine. Be sure to apply over dry eschar. Ok to leave open to air. No soaking feet.  - Ortho follow up to discuss elective amputation on 3/3 w/ Dr. Leahy  - Continue with LE elevation, able to ambulate w/ PT/OT     Urinary retention with indwelling phan  Acute simple cystitis, resolved  Gross hematuria  Hx E. Coli UTI  Positive urine cultures for E. Coli and completed macrobid 5 day course through 2/7. Urine is pink on ARU admission, trace. Patient failed trial of void inpatient. Urology was consulted inpatient and recommended continue indwelling phan.   - continue indwelling phan  - ensure phan is changed at 2-4 weeks  - will need urology follow up for trial of void     Chronic intermittent Asthma  Lungs clear on ARU admission  - Continue PTA inhalers (levalbuterol and spiriva)  - Magic mouthwash ordered       JYOTI - Continue BiPAP at night    Hyperlipidemia - Continue lipitor 40 mg daily    Bilateral Upper and lower extremity joint Pain  Patient reports pain is improved. Has been using narcotics (percocet) along with shae, topical nsaids, and muscle relaxants. Aggressive diuresis vs diabetic neuropathy. Pain management curbside consulted inpatient 1/31: decrease gabapentin to BID, stop flexeril, add Robaxin 400 q6hr PRN  - PRN Lidocaine patches  - continue inpatient pain regiment at ARU  - encourage increase mobility  - follow up with PCP on discharge        Diet: Combination Diet 2 gm K Diet; Moderate Consistent Carb (60 g CHO per Meal) Diet; 2 gm NA Diet  Fluid restriction 2000 ML FLUID  Room Service  Snacks/Supplements Adult: Other; Food Snacks BID: String cheese, crackers, fruit cup (peaches/pears or grapes) and SF Gelatein @ 2 pm, and turkey sandwich on wheat bread with lettuce, tomato and quinn & a fruit cup (peaches/pears/grapes) at 8 pm; B...    DVT Prophylaxis: DOAC  Phan Catheter: Not  "present  Lines: None     Cardiac Monitoring: None  Code Status: Full Code      Clinically Significant Risk Factors                       # DMII: A1C = 13.5 % (Ref range: <5.7 %) within past 3 months, PRESENT ON ADMISSION  # Severe Obesity: Estimated body mass index is 74.11 kg/m  as calculated from the following:    Height as of 1/23/23: 1.702 m (5' 7\").    Weight as of this encounter: 214.6 kg (473 lb 3.2 oz)., PRESENT ON ADMISSION         Disposition Plan      Expected Discharge Date: 02/28/2023                The patient's care was discussed with the Bedside Nurse, Patient and Primary team.    June Loo PA-C  Hospitalist Service  [facility]  Securely message with [employer] (more info)  Text page via [facility] Paging/Directory   ______________________________________________________________________    Interval History    Pricilla is still very tired today and notes she is very motivated to work with therapy but notes it has been very exhausting and painful for her legs and feet. She feels pain down the back of her legs, which she endorses feel heavy, and she is worried this will limit her ability to participate in therapies.    She cannot discern any new swelling.  She continues to breath without issues.  No chest pain, sob, palpitations, fevers, abdominal pain.     Physical Exam   Vital Signs: Temp: 96.9  F (36.1  C) Temp src: Oral BP: 96/66 Pulse: 83   Resp: 16 SpO2: 98 % O2 Device: None (Room air)    Weight: 473 lbs 3.2 oz  GENERAL: Alert and oriented. NAD. Sitting in recliner and able to sit forward. Cooperative.   HEENT: Anicteric sclera. NC. AT.   CV: RRR. S1, S2. No murmurs appreciated.   RESPIRATORY: Effort normal on RA. Lungs CTAB with no wheezing, rales, rhonchi.   GI: Abdomen soft, NABS  NEUROLOGICAL: No focal deficits. Moves all extremities.   Extremities: + anasarca     Medical Decision Making       35 MINUTES SPENT BY ME on the date of service doing chart " review, history, exam, documentation & further activities per the note.      Data

## 2023-02-17 NOTE — PLAN OF CARE
Discharge Planner Post-Acute Rehab OT:     Discharge Plan: home with pca 10 hrs during the day and 4 at night    Precautions: falls    Current Status:  ADLs:    Mobility: min a transfers cga with fww to 22 inch height    Grooming:sba    Dressing: mod a u/b dep l/b    Socks;sba with l/h equipment with doff/ don socks    Bathing: pt able towash 8/10 areas assist with feet and buttocks. dep liko to rollling shower chair    Toileting:hygiene  pt sba sitting with priyanka cares with toilet aide while sitting., Transfer: cga to commode over toilet  IADLs:tba  Vision/Cognition: assess higher cog     Assessment:pt educated in use of toilet hygiene aide and adaptive equipment for l/b dressing, pt sba sitting with priyanka cares with toilet aide while sitting. sba with l/h equipment with doff/ don socks  Other Barriers to Discharge (DME, Family Training, etc):closer to d/ discussed need for commode over toilet      pt has in room sock aide dressing stick , reacher and toilet hygiene aide

## 2023-02-17 NOTE — PLAN OF CARE
FOCUS/GOAL  Pain management and Medical management    ASSESSMENT, INTERVENTIONS AND CONTINUING PLAN FOR GOAL:  Uncontrolled pain not relieved with current pain regimen. Received all the PRN available in 2h window w/o  Good result. Provider informed. Seen by medicine team. She was started on Lidocaine patch applied to the back. Other muscle relaxant given w/o effect. Patient sent down for lumbar RX. Decreased appetite.  Voided twice this shift. PVR not done due to ongoing situation.  Will  continue with POC.   Goal Outcome Evaluation:      Plan of Care Reviewed With: patient    Overall Patient Progress: no changeOverall Patient Progress: no change    Outcome Evaluation: Unbereable pain not relieved with current pain regimen.  New meds started, lab and Rx ordered.

## 2023-02-17 NOTE — PROGRESS NOTES
IP Diabetes Management  Daily Note           Assessment and Plan:   HPI: Pricilla Brown is a 48 year old female with a past medical history of chronic HFrEF 2/2 NICM, persistent atrial fibrillation (on Eliquis), asthma, diabetes mellitus type II, hypertension, obesity, hypothyroidism 2/2 radioablation for Graves disease, JYOTI, CKD, and recent admission for HF exacerbation (12/25/22-1/1/23) who was admitted on 1/23/23 with heart failure exacerbation with hospital course complicated by encephalopathy, hypercarbia, NIRAJ, hyperkalemia, labile blood glucose, UTI, dry gangrene of toes on right foot, and pain.  She is now admitted to ARU on 2/14/23 for rehabilitation.     Assessment:   1)Type II Diabetes Mellitus, uncontrolled (A1c 13.5%), with insulin resistance.    Plan:    - Lantus 65 units once daily AM but decrease lantus to 60 units tomorrow, 2/18 at 08:30 am   -Novolog  1:4g CHO with breakfast, lunch, and decrease Novolog at dinner to 1:5 and snacks/supplements.   -Continue Novolog custom 2/30 sliding scale TID AC and HS  For BG >140 and ?200 at bedtime   -BG monitoring TID AC, HS, 0200   -holding PTA Ozempic, due to NIRAJ. Once this resolves, we could resume (or substitute with Victoza while at rehab)   -hypoglycemia protocol   -carb counting protocol   -diabetes education needs will be assessed closer to discharge.     Outpatient follow up: with [facility]   Plan discussed with patient, bedside RN, and primary team via this note.        Interval History and Assessment: interval glucose trend reviewed:   Called Pricilla on the phone this am.  She is working hard at her PT. She had a low BG early am of 73 and she had some juice.  She had quite a bit of activity and creat is improved to 1.27 and her BG running lower. She had already received her lantus at 07:44 this am. She says she had 2  popcicles overnight because she was hot. She is trying not to eat anyside food and is very serious about keeping BG under  "control. Will decrease lantus to 60 units today for BG 73- and decrease CHO at dinner to try and prevent low.  She eats small breakfast, lunch, dinner at 5 pm and then \"snack\" at 8 pm  Per previous note:  Snacks at 10AM, 8PM added on 2/15, to help Pricilla resist getting outside foods brought in. HS sliding scale order updated with request to move HS BG check and sliding scale to 2300, if she has her snack (sandwich).          Current nutritional intake and type: Orders Placed This Encounter      Combination Diet 2 gm K Diet; Moderate Consistent Carb (60 g CHO per Meal) Diet; 2 gm NA Diet      PTA Diabetes Regimen:   Tuojeo 40 unit(s) once daily at HS  Aspart 10 units with each meal + additional correction scale insulin as follows based on pre-meal glucose      -140 - 160: + 1 unit     -161 - 180: + 2 units     -181 - 200: + 3 units, and so on (sensitivity of 20 mg/dL)     Ozempic 1 mg weekly - she reports she did NOT resume as instructed     BG monitoring frequency:  3-4 times per day  BG trends at home: \"all over\" but mostly between 85 - 300      Historical:   Tuojeo (glargine U300) 225 units once daily  Novolog 100  Units with meals, plus 1/15 correction scale  Trulicity 1mg weekly on Mondays  Jardiance 25 mg daily AM - stopped 2/2 UTI's   (has not tolerated Metformin in the past)    Discharge Planning: TBD           Diabetes History:   Type of Diabetes: Type 2 Diabetes Mellitus  Lab Results   Component Value Date    A1C 13.5 12/25/2022    A1C >15.0 07/23/2021    A1C >14.0 08/19/2020    A1C 8.4 10/11/2018    A1C 9.6 05/29/2018    A1C 9.0 04/07/2017    A1C 12.1 03/10/2016              Review of Systems:     The Review of Systems is negative other than noted in the Interval History.           Medications:     Current Facility-Administered Medications   Medication     acetaminophen (TYLENOL) tablet 650 mg     albuterol (PROVENTIL HFA/VENTOLIN HFA) inhaler     albuterol (PROVENTIL) neb solution 2.5 mg     apixaban " ANTICOAGULANT (ELIQUIS) tablet 5 mg     atorvastatin (LIPITOR) tablet 40 mg     benzocaine-menthol (CHLORASEPTIC) 6-10 MG lozenge 1 lozenge     [Held by provider] bumetanide (BUMEX) tablet 1 mg     capsaicin (ZOSTRIX) 0.025 % cream 1 applicator     clotrimazole (LOTRIMIN) 1 % cream     glucose gel 15-30 g    Or     dextrose 50 % injection 25-50 mL    Or     glucagon injection 1 mg     diclofenac (VOLTAREN) 1 % topical gel 2 g     diltiazem ER (CARDIZEM SR) 12 hr capsule 120 mg     docusate sodium (COLACE) capsule 100 mg     gabapentin (NEURONTIN) capsule 600 mg     hydrALAZINE (APRESOLINE) tablet 10 mg     hydrALAZINE (APRESOLINE) tablet 10 mg     hydrOXYzine (ATARAX) tablet 25 mg    Or     hydrOXYzine (ATARAX) tablet 50 mg     insulin aspart (NovoLOG) injection (RAPID ACTING)     insulin aspart (NovoLOG) injection (RAPID ACTING)     insulin aspart (NovoLOG) injection (RAPID ACTING)     insulin aspart (NovoLOG) injection (RAPID ACTING)     insulin aspart (NovoLOG) injection (RAPID ACTING)     insulin aspart (NovoLOG) injection (RAPID ACTING)     insulin glargine (LANTUS PEN) injection 65 Units     levothyroxine (SYNTHROID/LEVOTHROID) tablet 200 mcg     [START ON 2/19/2023] levothyroxine (SYNTHROID/LEVOTHROID) tablet 400 mcg     methocarbamol (ROBAXIN) tablet 500 mg     metoprolol succinate ER (TOPROL XL) 24 hr tablet 50 mg     miconazole (MICATIN) 2 % powder     mineral oil-hydrophilic petrolatum (AQUAPHOR)     naloxone (NARCAN) injection 0.2 mg    Or     naloxone (NARCAN) injection 0.4 mg    Or     naloxone (NARCAN) injection 0.2 mg    Or     naloxone (NARCAN) injection 0.4 mg     nicotine (NICODERM CQ) 14 MG/24HR 24 hr patch 1 patch    And     nicotine Patch in Place     oxyCODONE-acetaminophen (PERCOCET) 5-325 MG per tablet 1 tablet     Patient is already receiving anticoagulation with heparin, enoxaparin (LOVENOX), warfarin (COUMADIN)  or other anticoagulant medication     polyethylene glycol (MIRALAX) Packet 17  g     simethicone (MYLICON) chewable tablet 80 mg     topiramate (TOPAMAX) tablet 25 mg     umeclidinium (INCRUSE ELLIPTA) 62.5 MCG/ACT inhaler 1 puff     Facility-Administered Medications Ordered in Other Encounters   Medication     sodium chloride (PF) 0.9% PF flush 10 mL            Physical Exam:    /75 (BP Location: Right arm, Patient Position: Supine, Cuff Size: Adult Large)   Pulse 83   Temp 96.9  F (36.1  C) (Oral)   Resp 16   Wt (!) 214.6 kg (473 lb 3.2 oz)   SpO2 98%   BMI 74.11 kg/m    Spoke with Pricilla over the phone  Constitutional:  alert, no acute distress, always very pleasant    Respiratory: nonlabored, no cough on the phone    Psychiatric: mentation appears normal, calm          Data:     Recent Labs   Lab 02/17/23  0741 02/17/23  0219 02/16/23  2135 02/16/23  1656 02/16/23  1253 02/16/23  1153   * 73 124* 150* 159* 181*     ROUTINE IP LABS (Last four results)  BMP  Recent Labs   Lab 02/17/23  0741 02/17/23  0219 02/16/23  2135 02/16/23  1656 02/16/23  1253 02/14/23  0807 02/14/23  0715 02/13/23  0723 02/13/23  0647 02/12/23  1558 02/12/23  1143   NA  --   --   --   --  134*  --  134*  --  134*  --  130*   POTASSIUM  --   --   --   --  4.6  --  4.4  --  4.7  --  4.8   CHLORIDE  --   --   --   --  96*  --  96*  --  93*  --  92*   JUSTIN  --   --   --   --  9.5  --  9.0  --  9.1  --  9.2   CO2  --   --   --   --  30*  --  26  --  30*  --  27   BUN  --   --   --   --  51.9*  --  70.0*  --  79.4*  --  78.4*   CR  --   --   --   --  1.27*  --  1.41*  --  1.68*  --  1.82*   * 73 124* 150* 159*   < > 223*   < > 151*   < > 328*    < > = values in this interval not displayed.     CBC  Recent Labs   Lab 02/16/23  1253 02/12/23  0346   WBC 7.1 7.4   RBC 4.69 4.49   HGB 13.8 13.2   HCT 44.8 42.8   MCV 96 95   MCH 29.4 29.4   MCHC 30.8* 30.8*   RDW 16.4* 16.6*    346     INRNo lab results found in last 7 days.  Lab Results   Component Value Date    AST 37 01/29/2023    AST 13  08/19/2020     Lab Results   Component Value Date    ALT 11 01/29/2023    ALT 30 08/19/2020     Lab Results   Component Value Date    BILICONJ 0.0 10/17/2013      Lab Results   Component Value Date    BILITOTAL 1.4 01/29/2023    BILITOTAL 0.5 08/19/2020     Lab Results   Component Value Date    ALBUMIN 3.1 02/11/2023    ALBUMIN 2.8 06/15/2022    ALBUMIN 2.9 08/19/2020     Lab Results   Component Value Date    PROTTOTAL 7.5 01/29/2023    PROTTOTAL 8.0 08/19/2020      Lab Results   Component Value Date    ALKPHOS 97 01/29/2023    ALKPHOS 73 08/19/2020       35 minutes spent on the date of the encounter doing chart review, history, documentation and further activities per the note        Contacting the Inpatient Diabetes Team   From 8AM-4PM: page inpatient diabetes provider that is following the patient, or utilize the job code paging system.   From 4PM-8AM: page the job code for endocrine fellow on call.       Please use the following job code to reach the Inpatient Diabetes team. Note that you must use an in house phone and that job codes cannot receive text pages.     Dial [phone] (or star-star-star [phone] on the new [employer] telephones), then [phone] to reach the endocrine-diabetes provider on call.  Jamilah Prater PA-C  Inpatient Diabetes Service  Pager   [phone]  2/17/2023

## 2023-02-17 NOTE — PROGRESS NOTES
Individualized Overall Plan Of Care (IOPOC)      Rehab diagnosis/Impairment Group Code: 09 cardiac -  heart failure exacerbation  Acute exacerbation of chf (congestive heart failure) (h)       Expected functional outcome: goal is to reach a level of mod i    Clinical Impression Comments: cardiac deconditioning post prolonged medical course     Mobility: PLOF pt was mod I for limited household mobility, extensive PCA services for IADLs. Has had prolonged and complicated hospital staty, presents with generalized weakness and deconditioning. Currently below baseline and to benefit from skilled PT services to maximize independence with functional mobility for safe discharge home.    ADL:  Reach a level of mod I     Communication/Cognition/Swallow:   Orders Placed This Encounter      Combination Diet 2 gm K Diet; Moderate Consistent Carb (60 g CHO per Meal) Diet; 2 gm NA Diet      Intensity of therapy:   PT 90 minutes, daily, for 20 days   OT 90 minutes, Daily, for 20 days       Education anticoagulation  Neuropsychology Testing: No        Medical Prognosis: good       Physician summary statement: cardiac deconditioning post prolonged medical course, goal is to reach level of mod I     Discharge destination: prior home  Discharge rehabilitation needs: outpatient, PT and OT      Estimated length of stay: 20 days       Rehabilitation Physician Vladislav Vazquez DO

## 2023-02-17 NOTE — PROGRESS NOTES
"  [facility]   Acute Rehabilitation Unit  Daily progress note    INTERVAL HISTORY  Pricilla Brown was seen and examined at bedside this morning.  No acute events reported overnight.  However, patient reports that she woke with significant pain in low back and bilateral legs.  States she woke in an \"awkward\" position and also had mattress changed yesterday (from air mattress) and thought maybe she \"slept wrong\".  Pain has been increasing in intensity through the morning.  She describes pain as located in bilateral low back, radiating to buttocks and down back of leg all the way to her feet.  She describes as \"excruciating\".  States she has had low back pain in the past, sometimes radiates to buttock but never this severe or radiating this far down her legs.  She notes some improvement with walking but then increased afterwards when she returned to sitting.  Some improvement with heat on back of leg, repositioning can make pain better or worse on one side.  Did not get much relief with Percocet or muscle relaxer.  Aggravated by lifting her legs in bed.  She reports 3 episodes of loose stools this morning.  Denies abdominal pain, nausea, or vomiting; but appetite poor due to severe pain.  Keenan was removed this morning and has been able to void.  Denies dysuria.  Also denies chest pain, palpitations, shortness of breath, cough.    Functionally, she is needing SBA for rolling in bed, min A for supine<>sit, CGA to SBA for transfers with FWW, and ambulating up to 25' with SBA and FWW.  Therapy participation today has been limited by pain.  OT did educate on use of toilet hygiene aide and adaptive equipment for l/b dressing, pt sba sitting with priyanka cares with toilet aide while sitting and sba with l/h equipment with doff/ don socks.    MEDICATIONS    apixaban ANTICOAGULANT  5 mg Oral BID     atorvastatin  40 mg Oral Daily     [Held by provider] bumetanide  1 mg Oral Daily     diltiazem " ER  120 mg Oral BID     gabapentin  600 mg Oral BID     hydrALAZINE  10 mg Oral TID     insulin aspart  2-16 Units Subcutaneous TID AC     insulin aspart  2-12 Units Subcutaneous At Bedtime     insulin aspart   Subcutaneous Daily with breakfast     insulin aspart   Subcutaneous Daily with lunch     insulin aspart   Subcutaneous Daily with supper     insulin glargine  65 Units Subcutaneous QAM AC     levothyroxine  200 mcg Oral Once per day on Mon Tue Wed Thu Fri Sat     [START ON 2/19/2023] levothyroxine  400 mcg Oral Weekly     metoprolol succinate ER  50 mg Oral Daily     miconazole   Topical BID     nicotine  1 patch Transdermal Daily    And     nicotine   Transdermal Q8H OPHELIA     topiramate  25 mg Oral Daily     umeclidinium  1 puff Inhalation Daily        acetaminophen, albuterol, albuterol, sore throat, capsaicin, clotrimazole, glucose **OR** dextrose **OR** glucagon, diclofenac, docusate sodium, hydrALAZINE, hydrOXYzine **OR** hydrOXYzine, insulin aspart, methocarbamol, mineral oil-hydrophilic petrolatum, naloxone **OR** naloxone **OR** naloxone **OR** naloxone, oxyCODONE-acetaminophen, - MEDICATION INSTRUCTIONS -, polyethylene glycol, simethicone     PHYSICAL EXAM  /75 (BP Location: Right arm, Patient Position: Supine, Cuff Size: Adult Large)   Pulse 83   Temp 96.9  F (36.1  C) (Oral)   Resp 16   Wt (!) 214.6 kg (473 lb 3.2 oz)   SpO2 98%   BMI 74.11 kg/m     Gen: NAD, lying in bed  HEENT: NC/AT, MMM  Cardio: irregularly irregular, no murmurs appreciated  Pulm: non-labored on room air, lungs CTA bilaterally  Abd: obese, soft, non-tender, bowel sounds present  Ext: no pitting edema in bilat lower extremities  Neuro/MSK: awake, alert, PERRL, EOMI, face symmetric, moving all extremities in bed, pain with straight leg raise, spasms noted in right lumbar paraspinal muscles    LABS  Last Basic Metabolic Panel:  Recent Labs   Lab Test 02/17/23  0219 02/16/23  2135 02/16/23  1656 02/16/23  1253  02/14/23  0807 02/14/23  0715 02/13/23  0723 02/13/23  0647   NA  --   --   --  134*  --  134*  --  134*   POTASSIUM  --   --   --  4.6  --  4.4  --  4.7   CHLORIDE  --   --   --  96*  --  96*  --  93*   CO2  --   --   --  30*  --  26  --  30*   ANIONGAP  --   --   --  8  --  12  --  11   GLC 73 124* 150* 159*   < > 223*   < > 151*   BUN  --   --   --  51.9*  --  70.0*  --  79.4*   CR  --   --   --  1.27*  --  1.41*  --  1.68*   GFRESTIMATED  --   --   --  52*  --  46*  --  37*   JUSTIN  --   --   --  9.5  --  9.0  --  9.1    < > = values in this interval not displayed.     CBC RESULTS:   Recent Labs   Lab Test 02/16/23  1253 02/12/23  0346 02/09/23  0659   WBC 7.1 7.4 8.4   RBC 4.69 4.49 4.66   HGB 13.8 13.2 13.8   HCT 44.8 42.8 44.5   MCV 96 95 96   MCH 29.4 29.4 29.6   MCHC 30.8* 30.8* 31.0*   RDW 16.4* 16.6* 16.4*    346 331       Rehabilitation - continue comprehensive acute inpatient rehabilitation program with multidisciplinary approach including therapies, rehab nursing, and physiatry following. See interval history for updates.      ASSESSMENT AND PLAN  Pricilla Brown is a 48 year old female with a past medical history of chronic HFrEF 2/2 NICM, persistent atrial fibrillation (on Eliquis), asthma, diabetes mellitus type II, hypertension, obesity, hypothyroidism 2/2 radioablation for Graves disease, JYOTI, CKD, and recent admission for HF exacerbation (12/25/22-1/1/23) who was admitted on 1/23/23 with heart failure exacerbation with hospital course complicated by encephalopathy, hypercarbia, NIRAJ, hyperkalemia, labile blood glucose, UTI, dry gangrene of toes on right foot, and pain.  She is now admitted to ARU on 2/14/23 for multidisciplinary rehabilitation and ongoing medical management.        Admission to acute inpatient rehab 02/14/23.    Impairment group code: 09 Cardiac -  heart failure exacerbation        1. PT and OT 90 minutes of each on a daily basis, in addition to rehab nursing and close  management of physiatrist.       2. Impairment of ADL's: Noted to have impaired activity tolerance, impaired balance, impaired ROM, impaired sensation, impaired strength and pain, all affecting her ability to safely and independently perform basic ADLs.  Goal for mod I with basic ADLs with assist for IADLs and bathing as PTA.     3. Impairment of mobility:  Noted to have impaired activity tolerance, impaired balance, impaired ROM, impaired sensation, impaired strength and pain, all affecting her ability to safely and independently perform basic mobility.  Goal for mod I with basic mobility.     4. Medical Conditions  New actions/orders/updates for today are in blue.     Acute on Chronic Systolic Heart Failure (EF 40-45%)  Long-Standing Persistent Atrial Fibrllation  Hypertension   Troponin Elevation 2/2 CHF  [PTA meds: apixaban 5 mg BID, atorvastatin 40 mg daily, Bumex 5 mg BID, hydralazine 12.5 mg TID, hydrochlorothiazide 12.5 mg BID, isosorbide dinitrate 20 mg TID, Toprol XL 75 mg daily, spironolactone 50 mg daily]  Pt admitted with c/o increasing SOB, MURRAY, chest tightness, weight gain in spite of compliance with PO diuretics. NT BNP 5891 (was 2030 during most recent admission).  Lactic acid 2.2 on admission with improvement to 1.1 with diuresis. Troponin mildly elevated, 67-->75-->60 likely 2/2 CHF exacerbation; no concern for ACS currently. Most recent Echo with EF 40-45%. Coronary angiogram 2012 with no significant CAD. Of note, pt recently admitted 12/25-1/1 HF exacerbation, diuresed to 472lbs. Pt weight on admission 1/23 555lbs and down to 450lbs on discharge to ARU.  Wt Readings from Last 4 Encounters:   02/17/23 (!) 214.6 kg (473 lb 3.2 oz)   02/14/23 (!) 204.1 kg (450 lb)   01/01/23 (!) 214.4 kg (472 lb 9.6 oz)   10/31/22 (!) 201 kg (443 lb 3.2 oz)   - Continue Bumex 1 mg daily, with additional prn dose for weight gain   - Per cardiology, suspect EDW is around 445-450 lbs.  Monitor daily weights.  Call  [facility] clinic if weight increases 5 lbs in a day two days in a row or 10 lbs in 1 week.  Weight up by 20 pounds today, likely inaccurate.  Per IM, continue Bumex 1 mg daily and monitor.  - Continue I/O, low Na diet, 2L fluid restriction   - BB: Continue Toprol XL 25mg daily  - ACEi/ARB/ARNI: Discontinued Lisinopril d/t allergy, if BP elevated, can trial Losartan    - SGLT2i: Contraindicated with recurrent yeast infections   - Aldosterone Antagonist: Discontinued Aldactone due to NIRAJ, hyperkalemia  - Rate Control: BB as above. Given mildly reduced EF, ok to use Diltiazem 120 mg BID  - Anticoagulation: CHADSVASC 3; Continue PTA apixaban  - Afterload reduction: Continue hydralazine 10 mg TID (hold if SBP <110), can give additional PRN dose if SBP >180  - [facility] consulted; follow up after ARU discharge.  Plan to discuss CardioMEMS at this appointment.  Currently scheduled on 2/24.     Chronic Kidney Disease Stage 3b  NIRAJ, improving  Longstanding history of diabetes and HTN. Baseline Cr ~1.2-1.4. Cr on admission 1.56. During hospitalization, patient diuresed to 427 lbs with NIRAJ, Cr peak 2.1. After backing off on diuretics, Cr improved to 1.4 on 2/14 at admission to ARU  - Avoid nephrotoxic agents  - Resume diuretics as listed above  - Trend BMP every M/Th.  Cr improving to 1.27 on 2/16     Hyperkalemia, resolved  Potassium continually rising since 2/9 with peak 2/11 @ 7.3.  EKG without acute changes, rhythm stable in a fib.  Nephrology consulted.  Potassium shifted with insulin, calcium gluconate for cardiac stability, lokelma 15 g X 1, IV Bumex total of 6 mg on 2/11/23.  Spironolactone discontinued.  - Low K diet  - I/O  - Continue to hold spironolactone, per nephrology can consider resuming once Cr stable  - Diuresis as above  - Trend BMP every M/Th.  2/16: K stable at 4.6     Hyponatremia  In setting of aggressive diuresis.  Now mild, improved to 134 on 2/14.  - Trend BMP every M/Th.  2/16: Na stable/low at  "134.     Hypothyroidism 2/2 below  Graves Disease s/p Radioiodine Ablation  TSH 20.4 and free T4 0.82 on admission. Endocrine consulted.  - Continue levothyroxine 200 mcg M-Sat + 400 mcg on Sunday.    - Recheck TFT in a month     Type 2 Diabetes Mellitus, insulin-dependent, uncontrolled  Hyperglycemia  Diabetic neuropathy  A1c 13.5% on 12/25/2022. Discrepancies in patient's home regimen.  Per endo, PTA on \"Toujeo 225 units daily, novolog 100 units with each meal + CS, and ozempic 1 mg weekly. She could not tolerate empaglifozin due to frequent UTIs and metformin due to GI side effects.\" Endocrinology consulted during admission.  Insulin gtt started 2/1 per endocrinology, transitioned back to subcutaneous lantus and novolog on 2/3/23.   Recent Labs   Lab 02/17/23  0219 02/16/23  2135 02/16/23  1656 02/16/23  1253 02/16/23  1153 02/16/23  0806   GLC 73 124* 150* 159* 181* 176*   - Monitor BG TIC AC + HS + 0200  - Endocrinology continuing to follow, appreciate assistance.  Per their recs on 2/16 (awaiting updated recs on 2/17):  - Continue Lantus 65 units qAM  - Continue Novolog very high intensity sliding scale insulin TID AC and HS 2/30 sliding scale  - Continue Novolog 1:4g CHO TID with meals and PRN with snacks/supplements  - Unable to resume PTA Ozepmic at ARU admission (not on formulary; held due to recent NIRAJ).  Can consider substitution with liraglutide if indicated.  - Hypoglycemia protocol  - OP follow up Thelma Archibald/Dr Silva Damon, scheduled on 3/17     Necrotic wounds of Right Toes 1, 3, and 4, resolved  Present on admission, pt unaware how long wounds have been present.  Vascular consulted; JOSETTE's completed showing patent vessels and normal waveforms. They recommend orthopedic foot and ankle consult.  Ortho consulted, felt consistent with dry gangrene, plan for wound care and follow-up as outpatient.   WOCN following as inpatient and provided wound care recs.  - WOCN consulted at ARU to continue to " follow, appreciate assistance.  WOCN assessed toes on 2/15, debrided devitalized skin from tips of digits 1 and 4, intact skin beneath with no open wounds, no evidence of dry gangrene.  Did lose toenails on those digits.  - Wound cares updated per [facility] recs: Bilateral toes and feet: Wash daily with warm water, soap and a washcloth, taking particular attention to wash well between and under toes. Dry thoroughly with a clean towel. Moisturize skin on bilateral feet and legs with Sween 24. [place] missing toenail beds on great and 4th toes with betadine.  Also needs consistent foot cares.  - WBAT  - Follow up with podiatry as outpatient (rescheduled from 2/17 to 3/3 with Dr. Tosin haro still anticipate at ARU)     Urinary Retention  Phan catheter placed on admission for strict I/O while aggressively diuresing. Removed 2/11, pt with urinary retention and straight cath x 3, Phan replaced. Urology curbside, continue phan for now, can re-attempt voiding trial as OP.  - Per patient request, with improving transfers to facilitate toileting needs, phan removed this morning for trial of void.  Has been able to void.  Continue to monitor bladder scans as ordered, ISC if indicated.  - If ongoing retention or phan replaced, follow up with OP Urology     Asthma  Patient endorsing mouth irritation w/ inhaler use. Per inpatient team, roof of mouth erythematous and appears excoriated but w/o evidence of yeast.   - Continue PTA inhalers (levalbuterol and spiriva)  - Magic mouthwash    Tobacco use disorder  - Continue nicotine patch 14 mcg, started during recent (December) admission     JYOTI  - Continue BiPAP at night    Hyperlipidemia   - Continue PTA Lipitor 40 mg daily     Acute low back pain with bilateral radiculopathy  Chronic Pain  [PTA meds: Tylenol PRN, diclofenac QID PRN, gabapentin 600 mg TID, oxycodone 5 mg 5x/day]  Of note, per PDMP #140 tabs of oxycodone 5 mg were filled on 2/16 while patient hospitalized.  Patient  "reporting diffuse joint pain while inpatient. Using narcotics along with gabapentin, topical nsaids, and muscle relaxants. Aggressive diuresis vs diabetic neuropathy.  Pain management curbside consulted 1/31 while inpatient; decrease gabapentin to BID, stopped flexeril and added robaxin.  Pain improved initially at ARU admission.  - Continue PRN Tylenol, voltaren, capsaicin, robaxin, hydroxyzine   - Continue gabapentin 600 mg BID  - Continue Topamax  - 2/17: significantly increased pain in bilateral low back radiating to bilateral buttocks and posterior legs to feet.  Decreased with ambulation, but increased with any movement in bed.  Minimal relief with Percocet, Tylenol, robaxin.  Adding lidocaine patch, bengay topical.  Some relief with heat, recommended to trial ice as well.  Will resume PM dose of gabapentin (as had been on PTA), initially at 300 mg though can increase to 600 mg as tolerated.  Given 1-time dose of flexeril and can switch from robaxin if improved.  Per IM, obtaining lumbar and thoracic spine xrays, vertebral body height grossly intact, no fracture.  - Reluctant to give steroid burst given diabetes.  Given complaining of severe pain limiting therapy participation, and on lower than PTA opioid dose, will switch Percocet to oxycodone 5 mg and increase frequency to q6h PRN.  - Continue to monitor     Morbid obesity  Estimated body mass index is 74.11 kg/m  as calculated from the following:    Height as of 1/23/23: 1.702 m (5' 7\").    Weight as of this encounter: 214.6 kg (473 lb 3.2 oz).  - Complicates rehab/cares    Possible COVID-19 exposure  Per ID, patient was previously on unit with high prevalence of COVID-19.  Tested negative on 2/15.    - Infection prevention recommending repeat testing to confirm negative at 5-7 days.  Ordered on 2/16 by ID provider; patient declining despite nursing education  - Can discuss further with patient tomorrow  - Currently afebrile, no signs/symptoms of COVID " infection        5. Adjustment to disability:  Clinical psychology to eval and treat if indicated  6. FEN: mod cons CHO, 2g NA, 2g K, 2L fluid restriction  7. Bowel: continent, monitor, PRN bowel meds available  8. Bladder: phan removed on 2/17 for TOV as above  9. DVT Prophylaxis: apixaban  10. GI Prophylaxis: not indicated  11. Code: full  12. Disposition: goal for home  13. ELOS:  10-14 days  14. Follow up Appointments on Discharge: PCP in 1-2 weeks, cardiology ([facility], scheduled 2/24), endocrinology (Thelma Archibald/Dr Silva Damon on 3/17), urology, ortho/podiatry (3/3 with Dr. Leahy)        Patient seen and discussed with Dr. Vladislav Vazquez, PM&R Staff Physician and discussed with FREDDY Brennan PA-C, PA-C  Physical Medicine & Rehabilitation

## 2023-02-17 NOTE — PLAN OF CARE
Discharge Planner Post-Acute Rehab PT:      Discharge Plan: Home (apt) 5 OCTAVIO with bilat rails, with resumed PCA services, home PT     Precautions: falls     Current Status:  Bed Mobility: SBA rolling L/R. Min A supine<>sit  Transfer: CGA/SBA with FWW  Gait: up to 25 ft with FWW, SBA  Stairs: NT  Balance: needs heavy UE support in standing     Assessment:  Low back and bilat LE radiating pain and paresthesias limiting performance today. No directional preference noted for centralization or peripheralization of sx, but pt requiring extended rest breaks d/t pain and fatigue, with therapist working to implement grounding techniques in setting of increased anxiety surrounding pain and fear of falls/failure.     Other Barriers to Discharge (DME, Family Training, etc):   Stairs to enter home- pt [facility] if needed   Pt has 4WW

## 2023-02-18 NOTE — PLAN OF CARE
Goal Outcome Evaluation:      Plan of Care Reviewed With: patient    Overall Patient Progress: no changeOverall Patient Progress: no change     Pt is alert and oriented x 4. Reports back pain and bilateral legs pain. Prn tylenol, robaxin and oxycodone given and effective. Prn lozenges x 2 and simethicone given. Pt didn't void this his shift, until the end. Incontinent of bladder x 1, greater than 800 ml ,no BM this shift. A2-3 Liko transfer due to pain.No BG concerns this shift.

## 2023-02-18 NOTE — PLAN OF CARE
Discharge Planner Post-Acute Rehab PT:      Discharge Plan: Home (apt) 5 OCTAVIO with bilat rails, with resumed PCA services, home PT     Precautions: falls     Current Status:  Bed Mobility: SBA rolling L/R. Min A supine<>sit  Transfer: CGA/SBA with FWW  Gait: up to 25 ft with FWW, SBA  Stairs: NT  Balance: needs heavy UE support in standing     Assessment: Pt reportedly had a really good morning with OT and PT and was very disappointed that she could not participate in PM session.  Worked with tech to get LEs wrapped in AM and allow for time in bed between therapies with heating pad for adequate rest to maximize endurance.  Noting that pain was more in R LE vs L LE as it was yesterday.  Fear she is regressing, tearful.  Pt appreciated time to reduce anxiety and set a plan for tomorrow.  Total assist x4 to scoot to head of bed.     Other Barriers to Discharge (DME, Family Training, etc):   Stairs to enter home- [facility] if needed   Pt has 4WW   68

## 2023-02-18 NOTE — PLAN OF CARE
Goal Outcome Evaluation:    Overall Patient Progress: no change    Outcome Evaluation: No change in Pt progress this shift.    Pt is alert and oriented. On 2L of oxygen via NC. On purewick. LBM 2/17. Ax1 SPT with walker. C/o pain; given heat packs with some relief. BG was 116. Pt refused insulin coverage for snack, provided education on importance of maintaining BG levels. Call light within reach and bed alarms on. Pt is able to make needs known. Will continue with POC.

## 2023-02-18 NOTE — PROGRESS NOTES
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  She is now admitted to ARU on 2/14/23 for rehabilitation.     Assessment:   1)Type II Diabetes Mellitus, uncontrolled (A1c 13.5%), with insulin resistance.    Plan:    - Lantus 65 units once daily AM  decreased lantus to 60 units2/18  (may change back to Tuojeo on discharge)   -Novolog  1:4g CHO with breakfast, lunch, and  Novolog at dinner to 1:5 and continue 1 per 4 grams for snacks/supplements \"For use during waking hours.  Patient may have 0200 snack withOUT aspart coverage to limit low BG risk while sleeping.\"     -Continue Novolog custom 2/30 sliding scale TID AC and HS  For BG >140 and 200 at bedtime   -BG monitoring TID AC, HS, 0200   -holding PTA Ozempic, due to NIRAJ. Once this resolves, we could resume (or substitute with Victoza while at rehab)   (Jardiance was stopped due to UTIs)   -hypoglycemia protocol   -carb counting protocol   -diabetes education needs will be assessed closer to discharge.     Outpatient follow up: with [facility]   Plan discussed with patient    Interval History and Assessment: interval glucose trend reviewed:   Had a snack --freeze pop-- at 0200, containing 17 carbs. Pricilla felt very nervous about taking aspart with glucose level 116 right before going back to sleep, so she declined the carb coverage.  She had an overnight glucose of 70s recently.  She is overall feeling " "so excited about having control of her glucose for the first time in ages. Reflects that not snacking on usual choices is a challenge.  Reviewed why ozempic on hold.  She understands plan (and has been on ozempic before).  Shares her diuretic will be increased again.      Per previous note:  Snacks at 10AM, 8PM added on 2/15, to help Pricilla resist getting outside foods brought in. HS sliding scale order updated with request to move HS BG check and sliding scale to 2300, if she has her snack (sandwich).          Current nutritional intake and type: Orders Placed This Encounter      Combination Diet 2 gm K Diet; Moderate Consistent Carb (60 g CHO per Meal) Diet; 2 gm NA Diet      PTA Diabetes Regimen:   Tuojeo 40 unit(s) once daily at HS  Aspart 10 units with each meal + additional correction scale insulin as follows based on pre-meal glucose      -140 - 160: + 1 unit     -161 - 180: + 2 units     -181 - 200: + 3 units, and so on (sensitivity of 20 mg/dL)     Ozempic 1 mg weekly - she reports she did NOT resume as instructed     BG monitoring frequency:  3-4 times per day  BG trends at home: \"all over\" but mostly between 85 - 300      Historical:   Tuojeo (glargine U300) 225 units once daily  Novolog 100  Units with meals, plus 1/15 correction scale  Trulicity 1mg weekly on Mondays  Jardiance 25 mg daily AM - stopped 2/2 UTI's   (has not tolerated Metformin in the past)    Discharge Planning: TBD, Estimated length of stay: 20 days            Diabetes History:   Type of Diabetes: Type 2 Diabetes Mellitus  Lab Results   Component Value Date    A1C 13.5 12/25/2022    A1C >15.0 07/23/2021    A1C >14.0 08/19/2020    A1C 8.4 10/11/2018    A1C 9.6 05/29/2018    A1C 9.0 04/07/2017    A1C 12.1 03/10/2016              Review of Systems:     The Review of Systems is negative other than noted in the Interval History.           Medications:     Current Facility-Administered Medications   Medication     acetaminophen (TYLENOL) " tablet 650 mg     albuterol (PROVENTIL HFA/VENTOLIN HFA) inhaler     albuterol (PROVENTIL) neb solution 2.5 mg     apixaban ANTICOAGULANT (ELIQUIS) tablet 5 mg     atorvastatin (LIPITOR) tablet 40 mg     benzocaine-menthol (CHLORASEPTIC) 6-10 MG lozenge 1 lozenge     bumetanide (BUMEX) tablet 1 mg     bumetanide (BUMEX) tablet 1 mg     capsaicin (ZOSTRIX) 0.025 % cream 1 applicator     clotrimazole (LOTRIMIN) 1 % cream     glucose gel 15-30 g    Or     dextrose 50 % injection 25-50 mL    Or     glucagon injection 1 mg     diclofenac (VOLTAREN) 1 % topical gel 2 g     diltiazem ER (CARDIZEM SR) 12 hr capsule 120 mg     docusate sodium (COLACE) capsule 100 mg     gabapentin (NEURONTIN) capsule 300 mg     gabapentin (NEURONTIN) capsule 600 mg     hydrALAZINE (APRESOLINE) tablet 10 mg     hydrALAZINE (APRESOLINE) tablet 10 mg     hydrOXYzine (ATARAX) tablet 25 mg    Or     hydrOXYzine (ATARAX) tablet 50 mg     insulin aspart (NovoLOG) injection (RAPID ACTING)     insulin aspart (NovoLOG) injection (RAPID ACTING)     insulin aspart (NovoLOG) injection (RAPID ACTING)     insulin aspart (NovoLOG) injection (RAPID ACTING)     insulin aspart (NovoLOG) injection (RAPID ACTING)     insulin aspart (NovoLOG) injection (RAPID ACTING)     insulin glargine (LANTUS PEN) injection 60 Units     levothyroxine (SYNTHROID/LEVOTHROID) tablet 200 mcg     [START ON 2/19/2023] levothyroxine (SYNTHROID/LEVOTHROID) tablet 400 mcg     Lidocaine (LIDOCARE) 4 % Patch 2 patch     lidocaine patch in PLACE     menthol (Topical Analgesic) 2.5% (BENGAY VANISHING SCENT) 2.5 % topical gel     methocarbamol (ROBAXIN) tablet 500 mg     metoprolol succinate ER (TOPROL XL) 24 hr tablet 50 mg     miconazole (MICATIN) 2 % powder     mineral oil-hydrophilic petrolatum (AQUAPHOR)     mineral oil-hydrophilic petrolatum (AQUAPHOR)     naloxone (NARCAN) injection 0.2 mg    Or     naloxone (NARCAN) injection 0.4 mg    Or     naloxone (NARCAN) injection 0.2 mg    Or      naloxone (NARCAN) injection 0.4 mg     nicotine (NICODERM CQ) 14 MG/24HR 24 hr patch 1 patch    And     nicotine Patch in Place     oxyCODONE (ROXICODONE) tablet 5 mg     Patient is already receiving anticoagulation with heparin, enoxaparin (LOVENOX), warfarin (COUMADIN)  or other anticoagulant medication     polyethylene glycol (MIRALAX) Packet 17 g     simethicone (MYLICON) chewable tablet 80 mg     topiramate (TOPAMAX) tablet 25 mg     umeclidinium (INCRUSE ELLIPTA) 62.5 MCG/ACT inhaler 1 puff     Facility-Administered Medications Ordered in Other Encounters   Medication     sodium chloride (PF) 0.9% PF flush 10 mL            Physical Exam:    /71 (BP Location: Right arm)   Pulse 100   Temp (!) 96  F (35.6  C) (Oral)   Resp 16   Wt (!) 223.6 kg (492 lb 14.4 oz)   SpO2 100%   BMI 77.20 kg/m        Gen: Alert, in NAD up in chair  HEENT: NC/AT hearing intact to conversational volume  Resp: Unlabored  Neuro: oriented x3, communicating clearly  Psych: calm open mood  /71 (BP Location: Right arm)   Pulse 100   Temp (!) 96  F (35.6  C) (Oral)   Resp 16   Wt (!) 223.6 kg (492 lb 14.4 oz)   SpO2 100%   BMI 77.20 kg/m              Data:     Recent Labs   Lab 02/18/23  0747 02/18/23  0212 02/17/23  2117 02/17/23  1857 02/17/23  1655 02/17/23  1230   * 116* 183* 191* 130* 163*     ROUTINE IP LABS (Last four results)  BMP  Recent Labs   Lab 02/18/23  0747 02/18/23  0212 02/17/23  2117 02/17/23  1857 02/16/23  1656 02/16/23  1253 02/14/23  0807 02/14/23  0715 02/13/23  0723 02/13/23  0647 02/12/23  1558 02/12/23  1143   NA  --   --   --   --   --  134*  --  134*  --  134*  --  130*   POTASSIUM  --   --   --   --   --  4.6  --  4.4  --  4.7  --  4.8   CHLORIDE  --   --   --   --   --  96*  --  96*  --  93*  --  92*   JUSTIN  --   --   --   --   --  9.5  --  9.0  --  9.1  --  9.2   CO2  --   --   --   --   --  30*  --  26  --  30*  --  27   BUN  --   --   --   --   --  51.9*  --  70.0*  --  79.4*   --  78.4*   CR  --   --   --   --   --  1.27*  --  1.41*  --  1.68*  --  1.82*   * 116* 183* 191*   < > 159*   < > 223*   < > 151*   < > 328*    < > = values in this interval not displayed.     CBC  Recent Labs   Lab 02/16/23  1253 02/12/23  0346   WBC 7.1 7.4   RBC 4.69 4.49   HGB 13.8 13.2   HCT 44.8 42.8   MCV 96 95   MCH 29.4 29.4   MCHC 30.8* 30.8*   RDW 16.4* 16.6*    346     INRNo lab results found in last 7 days.  Lab Results   Component Value Date    AST 37 01/29/2023    AST 13 08/19/2020     Lab Results   Component Value Date    ALT 11 01/29/2023    ALT 30 08/19/2020     Lab Results   Component Value Date    BILICONJ 0.0 10/17/2013      Lab Results   Component Value Date    BILITOTAL 1.4 01/29/2023    BILITOTAL 0.5 08/19/2020     Lab Results   Component Value Date    ALBUMIN 3.1 02/11/2023    ALBUMIN 2.8 06/15/2022    ALBUMIN 2.9 08/19/2020     Lab Results   Component Value Date    PROTTOTAL 7.5 01/29/2023    PROTTOTAL 8.0 08/19/2020      Lab Results   Component Value Date    ALKPHOS 97 01/29/2023    ALKPHOS 73 08/19/2020       35 minutes spent on the date of the encounter doing chart review, history, documentation and further activities per the note        Contacting the Inpatient Diabetes Team   From 8AM-4PM: page inpatient diabetes provider that is following the patient, or utilize the job code paging system.   From 4PM-8AM: page the job code for endocrine fellow on call.       Please use the following job code to reach the Inpatient Diabetes team. Note that you must use an in house phone and that job codes cannot receive text pages.     Dial [phone] (or star-star-star [phone] on the new [employer] telephones), then [phone] to reach the endocrine-diabetes provider on call.  Regina Monreal APRN -[phone]

## 2023-02-18 NOTE — PLAN OF CARE
Goal Outcome Evaluation:               Discharge Planner Post-Acute Rehab PT:      Discharge Plan: Home (apt) 5 OCTAVIO with bilat rails, with resumed PCA services, home PT     Precautions: falls     Current Status:  Bed Mobility: SBA rolling L/R. Min A supine<>sit  Transfer: CGA/SBA with FWW  Gait: up to 25 ft with FWW, SBA  Stairs: NT  Balance: needs heavy UE support in standing     Assessment:  Pt pleasant and motivated.  Session short  25 minutes due to nursing/med cares and MD visit discussing increased edema in B l/e, although per MD and pt, there was improvement.  Pt stated she felt her pain was under better control this AM compared to yesterday.      Other Barriers to Discharge (DME, Family Training, etc):   Stairs to enter home- [facility] needed   Pt has 4WW

## 2023-02-18 NOTE — PLAN OF CARE
FOCUS/GOAL  Medical management and Mobility    ASSESSMENT, INTERVENTIONS AND CONTINUING PLAN FOR GOAL:    Goal Outcome Evaluation:  Liko lift for transfers with 2 staff. Voided in toilet this am large amount of urine. Voided 800 ml  at 2 pm et  PVR 28 ml. Taking prn pain med's for rt lower leg discomfort. Up in chair for 2 hours et tolerated well. Fluid restriction continues. Taking food et snacks well et cho coverage per order. In bed working with therapies this afternoon.

## 2023-02-18 NOTE — PLAN OF CARE
Discharge Planner Post-Acute Rehab OT:     Discharge Plan: home with pca 10 hrs during the day and 4 at night    Precautions: falls    Current Status:  ADLs:    Mobility: min a transfers cga with fww to 22 inch height    Grooming:sba    Dressing: mod a u/b dep l/b    Socks: sba with l/h equipment with doff/ don socks    Bathing: pt able towash 8/10 areas assist with feet and buttocks. dep liko to rollling shower chair    Toileting:hygiene  pt sba sitting with priyanka cares with toilet aide while sitting., Transfer: cga to commode over toilet  IADLs:tba  Vision/Cognition: assess higher cog     Assessment: progressing with use of toilet aide for priyanka cares. Discussed potential dme needs for home (see section below). Pt has [employer] Waiver whom likely will cover costs (Rep is Flores [phone]). Performance limited by LE/back pain yet able to participate in full sessions. Initiated UB HEP. LUE limitations d/t fall.     Other Barriers to Discharge (DME, Family Training, etc):  Will likely need:   -bariatric commode overlay for toilet during daytime and to use as BSC for nighttime PRN  -bariatric extended tub bench  -toilet aide   -wide sock aide    pt has in room sock aide dressing stick , reacher and toilet hygiene aide

## 2023-02-18 NOTE — PROGRESS NOTES
Medicine Follow up Note    Follow up regarding Thoracic and lumbar XR, cardiac and volume status as well as general medical cares.    Pricilla notes that today she certainly appreciates increased swelling especially in both legs and is very concerned that her volume is increasing.  She notes that lymphedema wraps in the hospital were very helpful and she tolerated these.  She endorses continued good urine output.  Her Keenan catheter came out yesterday and she notes that when the pure wick went on yesterday she voided approximately 800 mL and further after this.    She notes needing to sit more upright recently which she was finding at home corresponded to development of fluid in her lungs.  She needed 2 L of oxygen overnight.  She is currently breathing well on room air without any other chest symptoms at this time.    She notes that she is much less fatigued today but continues to be concerned that fatigue will limit her ability to participate with therapies    She notes that she received local wound cares for her feet yesterday and that they are looking markedly better.    Today's vital signs, medications, and nursing notes were reviewed. Labs reviewed     /71 (BP Location: Right arm)   Pulse 100   Temp (!) 96  F (35.6  C) (Oral)   Resp 16   Wt (!) 223.6 kg (492 lb 14.4 oz)   SpO2 100%   BMI 77.20 kg/m    GENERAL: Alert and oriented. NAD. Sitting in bed with legs at hip height and able to sit forward. Cooperative.   HEENT: Anicteric sclera. NC. AT.   CV: RRR. S1, S2. No murmurs appreciated.   RESPIRATORY: Effort normal on RA. Lungs CTAB with no wheezing, rales, rhonchi.   GI: Abdomen soft, NABS  NEUROLOGICAL: No focal deficits. Moves all extremities.   Extremities: + anasarca with increased 4+ edema of the bilateral lower extremities with tightness of lower extremity skin.  Positive DP and PT pulses bilaterally.    A/P:  (see also my 2/17 note for further details)    Acute on Chronic Systolic Heart  Failure (EF 40-45%)  Long-Standing Persistent Atrial Fibrllation  Hypertension   Troponin Elevation 2/2 CHF, resolved  Pt admitted with c/o increasing SOB, MURRAY, chest tightness, weight gain in spite of compliance with PO diuretics. NT BNP 5891 (was 2030 during most recent admission).  Lactic acid 2.2 on admission with improvement to 1.1 with diuresis. Troponin mildly elevated, 67-->75-->60 likely 2/2 CHF exacerbation; no concern for ACS currently. Most recent Echo with EF 40-45%. Coronary angiogram 2012 with no significant CAD. Of note, pt recently admitted 12/25-1/1 HF exacerbation, diuresed to 472lbs. Pt weight on admission 1/23 555lbs.   - CSI team followed inpatient   - Volume Status: hypervolemic  - Diuresis: continue to hold PO Bumex 1 mg daily              - New EDW is 430 lbs, goal weigh 445-450lb    - Beta Blocker: Continue Toprol XL to 50mg  - ACEi/ARB/ARNI: contraindicated d/t lisinopril allergy.   - SGLT2i: Contraindicated with recurrent yeast infections   - Aldosterone Antagonist: Hold/discontinue PTA Spironolactone 50mg d/t hyperkalemia  - Rate Control: BB as above. Cont diltiazem 120 BID due to persistent tachycardia and borderline reduced EF.   - Afterload reduction: Cont hydralazine 10mg PO TID (hold parameters SBP <110), can give additional prn if sbp >180  - Anticoagulation: CHADSVASC 3; Continue PTA apixaban  - Strict I/O & Daily Weights  - Low Na Diet / 2L Fluid Restriction  - BMP M Th and as needed if new concerns - repeat BMP in a.m .given additional diuresis  - [facility] clinic to continue to follow - Cardiology follow up 2/24     Acute on chronic Kidney Disease Stage 3b, improved  Has recently been downtrending despite diuresis  -Repeat BMP in a.m. given additional diuresis  - check bladder scan as able given hx of urinary retention, if NIRAJ would replace indwelling phan     Right Toe Wound, necrotic toes  -Added lymphedema consults to place lymphedema wraps bilateral lower extremities  - WOCN:  wound cares to right toe daily. Paint toes with betadine. Be sure to apply over dry eschar. Ok to leave open to air. No soaking feet.  - Ortho follow up to discuss elective amputation on 3/3 w/ Dr. Leahy  - Continue with LE elevation, able to ambulate w/ PT/OT      June Loo PA-C  [facility]  Contact information available via [facility] Paging/Directory

## 2023-02-19 NOTE — PLAN OF CARE
FOCUS/GOAL  Bladder management, Pain management, and Medical management    ASSESSMENT, INTERVENTIONS AND CONTINUING PLAN FOR GOAL:    Goal Outcome Evaluation:  Patient complaining of increased pain in bilateral legs, mostly right. Liko lift used for transfers this shift. Down to x ray et then ultrasound. See results. UA  sent et running UC @ this time. Voided x 1 in pad this am et then voided 1100 in bedpan. Urine has sediment et foul smelling.   PVR done 0 mls. OxyContin given around 12 noon with relief. Legs wrapped by therapy. Taking food et fluids well. Carb coverage done et had snacks also, covered per order. Fluid restriction continues.

## 2023-02-19 NOTE — PLAN OF CARE
Orientation: A/Ox4  Bowel: mixed continence per report, no bm this shift  Bladder: mixed continence using purewick overnight, PVR still required, unable to obtain this shift  Pain: Endorses pain in her back and b/l legs, requested and received prn oxycodone and tylenol x1  Ambulation/Transfers: not oob this shift, requires A1-2 for bed mobility  Blood sugars: see results, no concerns at this time, snack provided at hs  Diet/ Liquids: Reg/thin/pills whole, 2 L fluid restriction, low Na+, renal diet  Oxygen: 2 LPM via NC at hs  Skin: Foot care completed again tonight, no new concerns  Bed alarm on for safety, call light within reach.

## 2023-02-19 NOTE — PLAN OF CARE
Discharge Planner Post-Acute Rehab PT:      Discharge Plan: Home (apt) 5 OCTAVIO with bilat rails, with resumed PCA services, home PT     Precautions: falls     Current Status:  Bed Mobility: SBA rolling L/R. Min A supine<>sit  Transfer: CGA/SBA with FWW  Gait: up to 25 ft with FWW, SBA  Stairs: NT  Balance: needs heavy UE support in standing     Assessment: pt down for x-ray and Us today, all (-) pt still limited by pain. Pt unable to tolerated STS or amb today, pt only able to tolerated seated TE. Pt was up in power recliner at start of PT session needing to be lifted to chair.   Other Barriers to Discharge (DME, Family Training, etc):   Stairs to enter home- [facility] if needed   Pt has 4WW

## 2023-02-19 NOTE — PLAN OF CARE
Discharge Planner Post-Acute Rehab OT:     Discharge Plan: home with pca 10 hrs during the day and 4 at night    Precautions: falls    Current Status:  ADLs:    Mobility: min a transfers cga with fww to 22 inch height    Grooming:sba    Dressing: mod a u/b dep l/b    Socks: sba with l/h equipment with doff/ don socks    Bathing: pt able towash 8/10 areas assist with feet and buttocks. dep liko to rollling shower chair    Toileting:hygiene  pt sba sitting with priyanka cares with toilet aide while sitting., Transfer: cga to commode over toilet  IADLs:tba  Vision/Cognition: assess higher cog     Assessment: Ruling out blood clot with xray/US due to LLE pain.  Pt able to stand and move to HOB with EOB raised and bariatric walker but pt crying with pain.  Max A of 2 to roll and assist of 5 hover mat from bed to cart.  Pt does well seated EOB for UB cares, LUE weakness is a barrier but pt knows her exercise and participates well.   PM RLE pain remains but medical issue ruled out.  Pt amb with walker and CGA of one, SBA of one for safety, to the toilet and then to bed.  Max A bed mobility.  Good participation and effort despite pain. POC remains appropriate.      Other Barriers to Discharge (DME, Family Training, etc):  Will likely need:   -bariatric commode overlay for toilet during daytime and to use as BSC for nighttime PRN  -bariatric extended tub bench  -toilet aide   -wide sock aide    pt has in room sock aide dressing stick , reacher and toilet hygiene aide

## 2023-02-19 NOTE — PROGRESS NOTES
[facility]    Medicine Progress Note - Hospitalist Service    Date of Admission:  2/14/2023    Updates today:  Right leg pain:  - patient in tears this morning with mid-right thigh pain  - XR right thigh and hip: no e/o fracture and mild degenerative changes  - US right LE no DVT and no clear e/o abscess or cellulitis  - topical icyhot  Volume/CHF:  - extra dose bumex to repeat today  - extra dose bumex yesterday and UOP somewhat increased, last bladder scan 250cc  Renal:  - Cr 1.2 --> 1.49, repeat in a.m.  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 She is admitted to ARU on 2/14/23.     Acute on Chronic Systolic Heart Failure (EF 40-45%)  Long-Standing Persistent Atrial Fibrllation  Hypertension   Troponin Elevation 2/2 CHF, resolved  Pt admitted with c/o increasing SOB, MURRAY, chest tightness, weight gain in spite of compliance with PO diuretics. NT BNP 5891 (was 2030 during most recent admission).  Lactic acid 2.2 on admission with improvement to 1.1 with diuresis. Troponin mildly elevated, 67-->75-->60 likely 2/2 CHF exacerbation; no concern for ACS currently. Most recent Echo with EF 40-45%. Coronary angiogram 2012 with no significant CAD. Of note, pt recently admitted 12/25-1/1 HF exacerbation, diuresed to 472lbs. Pt weight on admission 1/23 555lbs.   - Volume Status: Euvolemic  - Diuresis: PO Bumex 1 mg daily and additional 1mg prn (5lb/24h or 10lb in a  "week)              - New EDW is 430 lbs, goal weigh 445-450lb    - Beta Blocker: Continue Toprol XL to 50mg  - ACEi/ARB/ARNI: contraindicated d/t lisinopril allergy.   - SGLT2i: Contraindicated with recurrent yeast infections   - Aldosterone Antagonist: Hold/discontinue PTA Spironolactone 50mg d/t hyperkalemia  - Rate Control: BB as above. Cont diltiazem 120 BID due to persistent tachycardia and borderline reduced EF.   - Afterload reduction: Cont hydralazine 10mg PO TID (hold parameters SBP <110), can give additional prn if sbp >180  - Anticoagulation: CHADSVASC 3; Continue PTA apixaban  - Strict I/O & Daily Weights  - Low Na Diet / 2L Fluid Restriction  - BMP M Th and as needed if new concerns  - [facility] clinic to continue to follow   - Cardiology follow up 2/24     Right posterior thigh pain  Patient in tears (2/19) morning with mid-right thigh pain \"jelena horse\" that is constant.  PT notes very likely is hamstrings.  No e/o cellulitis or abscess on exam.   - XR right thigh and hip: no e/o fracture and mild degenerative changes  - US right LE no DVT and no clear e/o abscess or cellulitis  - topical icyhot  - trend CRP     Acute on chronic Kidney Disease Stage 3b  Hyperkalemia, resolved  Longstanding history of diabetes and HTN. Baseline Creatinine appears to be around 1.2-1.4. Elevated until recently prior to discharge in the setting of aggressive diuresis. Creatinine on ARU admission 1.4. K up to 7.3 (2/9) w/o EKG changes. Nephrology consulted inpatient. Cr stable at ARU 1.2-1.5.   - trend BMP  - Hold spironolactone (high K)  - bumex 1mg daily and 1mg additional prn weight gain >5lb or >10lb in 1 week (extra dose 2/18 and 2/19)  - Avoid nephrotoxic agents  - EKG prn if hyperkalelmia recurs      Graves Disease s/p Radioiodine Ablation  Hypothyroidism 2/2 Radioiodine Ablation  TSH 20.4 and free T4 0.82 on admission. Endocrine followed during inpatient admission and updated levothyroxine dosing.   - Endocrine " "consulted; recommendations below   - Continue levothyroxine 200 mcg M-Sat- 400 mcg on Sunday.    - Recheck TFT in a month (March 2023)     Type 2 Diabetes Mellitus on longterm insulin c/b peripheral neuopathy (A1C 13.5 on 12/25/22)  Morbid obesity  Discrepancies in patient's home regimen. On insulin drip briefly while inpatient. Endocrinology consulted during inpatient admission. Glucose trend as follows  Recent Labs   Lab 02/19/23  0738 02/19/23  0649 02/19/23  0210 02/18/23  2144 02/18/23  1742 02/18/23  1208   * 192* 109* 94 98 101*   - endocrinology following, appreciate recs, per 2/18 note:   -  Lantus 65 units once daily AM  decreased lantus to 60 units2/18  (may change back to Tuojeo on discharge)                 -Novolog  1:4g CHO with breakfast, lunch, and  Novolog at dinner to 1:5 and continue 1 per 4 grams for snacks/supplements \"For use during waking hours.  Patient may have 0200 snack withOUT aspart coverage to limit low BG risk while sleeping.\"                    -Continue Novolog custom 2/30 sliding scale TID AC and HS  For BG >140 and 200 at bedtime                 -BG monitoring TID AC, HS, 0200                 -holding PTA Ozempic, due to NIRAJ. Once this resolves, we could resume (or substitute with Victoza while at rehab)                 (Jardiance was stopped due to UTIs)                 -hypoglycemia protocol                 -carb counting protocol                 -diabetes education needs will be assessed closer to discharge.     Right Toe Wound, necrotic toes  Present on admission, right great toe and 3rd toe, dry gangrene, pt unaware how long wounds have been present. Vascular, podiatry and orthopedics consulted inpatient. ABIs with normal waveforms.  - WOCN: wound cares to right toe daily. Paint toes with betadine. Be sure to apply over dry eschar. Ok to leave open to air. No soaking feet.  - Ortho follow up to discuss elective amputation on 3/3 w/ Dr. Leahy  - Continue with LE " elevation, able to ambulate w/ PT/OT     Urinary retention with indwelling phan  Acute simple cystitis, resolved  Gross hematuria  Hx E. Coli UTI  Positive urine cultures for E. Coli and completed macrobid 5 day course through 2/7. Urine is pink on ARU admission, trace. Patient failed trial of void inpatient. Urology was consulted inpatient and recommended continue indwelling phan.   -  Phan out 2/17, consider replacing if worsening NIRAJ  - if phan is present, ensure phan is changed at 2-4 weeks  - will need urology follow up for trial of void     Chronic intermittent Asthma  Lungs clear on ARU admission  - Continue PTA inhalers (levalbuterol and spiriva)  - Magic mouthwash ordered       JYOTI - Continue BiPAP at night    Hyperlipidemia - Continue lipitor 40 mg daily    Bilateral Upper and lower extremity joint Pain  Patient reports pain is improved. Has been using narcotics (percocet) along with shae, topical nsaids, and muscle relaxants. Aggressive diuresis vs diabetic neuropathy. Pain management curbside consulted inpatient 1/31: decrease gabapentin to BID, stop flexeril, add Robaxin 400 q6hr PRN  - PRN Lidocaine patches  - continue inpatient pain regiment at ARU  - encourage increase mobility  - follow up with PCP on discharge        Diet: Combination Diet 2 gm K Diet; Moderate Consistent Carb (60 g CHO per Meal) Diet; 2 gm NA Diet  Fluid restriction 2000 ML FLUID  Snacks/Supplements Adult: Other; Food Snacks BID: String cheese, crackers, fruit cup (peaches/pears or grapes) and SF Gelatein @ 2 pm, and turkey sandwich on wheat bread with lettuce, tomato and quinn & a fruit cup (peaches/pears/grapes) at 8 pm; B...  Room Service    DVT Prophylaxis: DOAC  Phan Catheter: Not present  Lines: None     Cardiac Monitoring: None  Code Status: Full Code      Clinically Significant Risk Factors                       # DMII: A1C = 13.5 % (Ref range: <5.7 %) within past 3 months   # Severe Obesity: Estimated body mass index  "is 77.2 kg/m  as calculated from the following:    Height as of 1/23/23: 1.702 m (5' 7\").    Weight as of this encounter: 223.6 kg (492 lb 14.4 oz).          Disposition Plan     Expected Discharge Date: 02/28/2023                The patient's care was discussed with the Bedside Nurse, Patient, Primary team and PT.    June Loo PA-C  Hospitalist Service  [facility]  Securely message with [employer] (more info)  Text page via [facility] Paging/Directory   ______________________________________________________________________    Interval History    Today Pricilla is still feeling very edematous and she appreciated some increased urine output after every additional dose yesterday.  She notes that swelling continues to be more pronounced today.  She has not yet had lymphedema wraps and these are being placed this morning by PT.    She came to tears over pain in the right leg and has a focal area of tenderness on the posterior aspect of the right thigh that she notes has been ongoing for a few days and is very severe and she notes that \"something is wrong\".  She describes this pain as a charley horse that is constant and notes that her muscle is shaking and jerking.  She continues to have some pain in the lower back and hip that is less severe than this pain      She does not note any overt signs of infection and has not had any fevers or chills.  She notes difficulty breathing when laying flat is persistent.       Physical Exam   Vital Signs: Temp: 98  F (36.7  C) Temp src: Oral BP: 113/67 Pulse: 100   Resp: 16 SpO2: 91 % O2 Device: None (Room air)    Weight: 492 lbs 14.4 oz  GENERAL: Alert and oriented. NAD. Sitting in bed with legs at hip height and able to sit forward with assistance. Cooperative.   HEENT: Anicteric sclera. NC. AT.   CV: RRR. S1, S2. No murmurs appreciated.   RESPIRATORY: Effort normal on RA. Lungs CTAB with no wheezing, rales, rhonchi.   GI: Abdomen soft, " NABS  NEUROLOGICAL: No focal deficits. Moves all extremities.   Extremities: + tenderness over the midportion of the posterior left thigh without skin lesion, increased warmth or hyperpigmentation in this area and no palpable fluctuance.  We will follow-up + anasarca with increased 4+ edema of the bilateral lower extremities with tightness of lower extremity skin.  Positive DP and PT pulses bilaterally.       Medical Decision Making       60 MINUTES SPENT BY ME on the date of service doing chart review, history, exam, documentation & further activities per the note.      Data     I have personally reviewed the following data over the past 24 hrs:    6.4  \   13.0   / 183     137 100 57.0 (H) /  137 (H)   4.7 28 1.49 (H) \       ALT: 18 AST: 30 AP: 122 (H) TBILI: 0.6   ALB: 2.9 (L) TOT PROTEIN: 7.6 LIPASE: N/A       Procal: N/A CRP: 63.88 (H) Lactic Acid: N/A

## 2023-02-19 NOTE — PROGRESS NOTES
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  She is now admitted to ARU on 2/14/23 for rehabilitation.     Assessment:   1)Type II Diabetes Mellitus, uncontrolled (A1c 13.5%), with insulin resistance.    Plan:    - Lantus 60 units once daily AM  decreased lantus to 50 units  (may change back to Tuojeo on discharge)   -Novolog  Decreased to uniformly 1 per 5 grams  And for snacks, admin instruction reads \"For use during waking hours.  Patient may have 0200 snack withOUT aspart coverage to limit low BG risk while sleeping.\"     -Continue Novolog custom 2/30 sliding scale TID AC and HS  For BG >140 and 200 at bedtime   -BG monitoring TID AC, HS, 0200   -holding PTA Ozempic, due to NIRAJ. Once renal function stable we could resume (or substitute with Victoza while at rehab)   (Jardiance was stopped due to UTIs)   -hypoglycemia protocol   -carb counting protocol   -diabetes education needs will be assessed closer to discharge.     Outpatient follow up: with [facility] , Dr hogue on 3/17.    Plan discussed with patient    Interval History and Assessment: interval glucose trend reviewed:   Last night had a bigger snack- sandwich +, versus only a 17 gram carb freeze pop the night prior.  No aspart given.  BG improved fairly quickly after bfast.  Cr increased today after diuretic increased yesterday  See notes about pt's concerning pain this " "morning.    She is ordering meals with room service-- this seems really  Helpful given her multiple restrictions.              Per previous note:  Snacks at 10AM, 8PM added on 2/15, to help Pricilla resist getting outside foods brought in. HS sliding scale order updated with request to move HS BG check and sliding scale to 2300, if she has her snack (sandwich).          Current nutritional intake and type: Orders Placed This Encounter      Combination Diet 2 gm K Diet; Moderate Consistent Carb (60 g CHO per Meal) Diet; 2 gm NA Diet      PTA Diabetes Regimen:   Tuojeo 40 unit(s) once daily at HS  Aspart 10 units with each meal + additional correction scale insulin as follows based on pre-meal glucose      -140 - 160: + 1 unit     -161 - 180: + 2 units     -181 - 200: + 3 units, and so on (sensitivity of 20 mg/dL)     Ozempic 1 mg weekly - she reports she did NOT resume as instructed     BG monitoring frequency:  3-4 times per day  BG trends at home: \"all over\" but mostly between 85 - 300      Historical:   Tuojeo (glargine U300) 225 units once daily  Novolog 100  Units with meals, plus 1/15 correction scale  Trulicity 1mg weekly on Mondays  Jardiance 25 mg daily AM - stopped 2/2 UTI's   (has not tolerated Metformin in the past)    Discharge Planning: TBD, Estimated length of stay: 20 days            Diabetes History:   Type of Diabetes: Type 2 Diabetes Mellitus  Lab Results   Component Value Date    A1C 13.5 12/25/2022    A1C >15.0 07/23/2021    A1C >14.0 08/19/2020    A1C 8.4 10/11/2018    A1C 9.6 05/29/2018    A1C 9.0 04/07/2017    A1C 12.1 03/10/2016              Review of Systems:     The Review of Systems is negative other than noted in the Interval History.           Medications:     Current Facility-Administered Medications   Medication     acetaminophen (TYLENOL) tablet 650 mg     albuterol (PROVENTIL HFA/VENTOLIN HFA) inhaler     albuterol (PROVENTIL) neb solution 2.5 mg     apixaban ANTICOAGULANT (ELIQUIS) " tablet 5 mg     atorvastatin (LIPITOR) tablet 40 mg     benzocaine-menthol (CHLORASEPTIC) 6-10 MG lozenge 1 lozenge     bumetanide (BUMEX) tablet 1 mg     capsaicin (ZOSTRIX) 0.025 % cream 1 applicator     clotrimazole (LOTRIMIN) 1 % cream     glucose gel 15-30 g    Or     dextrose 50 % injection 25-50 mL    Or     glucagon injection 1 mg     diclofenac (VOLTAREN) 1 % topical gel 2 g     diltiazem ER (CARDIZEM SR) 12 hr capsule 120 mg     docusate sodium (COLACE) capsule 100 mg     gabapentin (NEURONTIN) capsule 300 mg     gabapentin (NEURONTIN) capsule 600 mg     hydrALAZINE (APRESOLINE) tablet 10 mg     hydrALAZINE (APRESOLINE) tablet 10 mg     hydrOXYzine (ATARAX) tablet 25 mg    Or     hydrOXYzine (ATARAX) tablet 50 mg     insulin aspart (NovoLOG) injection (RAPID ACTING)     insulin aspart (NovoLOG) injection (RAPID ACTING)     insulin aspart (NovoLOG) injection (RAPID ACTING)     insulin aspart (NovoLOG) injection (RAPID ACTING)     insulin aspart (NovoLOG) injection (RAPID ACTING)     insulin aspart (NovoLOG) injection (RAPID ACTING)     insulin glargine (LANTUS PEN) injection 50 Units     levothyroxine (SYNTHROID/LEVOTHROID) tablet 200 mcg     levothyroxine (SYNTHROID/LEVOTHROID) tablet 400 mcg     Lidocaine (LIDOCARE) 4 % Patch 2 patch     lidocaine patch in PLACE     methocarbamol (ROBAXIN) tablet 500 mg     methyl salicylate-menthol (ICY HOT) ointment     metoprolol succinate ER (TOPROL XL) 24 hr tablet 50 mg     miconazole (MICATIN) 2 % powder     mineral oil-hydrophilic petrolatum (AQUAPHOR)     mineral oil-hydrophilic petrolatum (AQUAPHOR)     naloxone (NARCAN) injection 0.2 mg    Or     naloxone (NARCAN) injection 0.4 mg    Or     naloxone (NARCAN) injection 0.2 mg    Or     naloxone (NARCAN) injection 0.4 mg     nicotine (NICODERM CQ) 14 MG/24HR 24 hr patch 1 patch    And     nicotine Patch in Place     oxyCODONE (ROXICODONE) tablet 5 mg     Patient is already receiving anticoagulation with heparin,  enoxaparin (LOVENOX), warfarin (COUMADIN)  or other anticoagulant medication     polyethylene glycol (MIRALAX) Packet 17 g     simethicone (MYLICON) chewable tablet 80 mg     topiramate (TOPAMAX) tablet 25 mg     umeclidinium (INCRUSE ELLIPTA) 62.5 MCG/ACT inhaler 1 puff     Facility-Administered Medications Ordered in Other Encounters   Medication     sodium chloride (PF) 0.9% PF flush 10 mL            Physical Exam:    /58   Pulse 87   Temp 97.6  F (36.4  C) (Oral)   Resp 16   Wt (!) 219.1 kg (483 lb 1.6 oz)   SpO2 97%   BMI 75.66 kg/m        Gen: Alert, in NAD up in bed  HEENT: NC/AT hearing intact to conversational volume  Resp: Unlabored  Neuro: oriented x3, communicating clearly  Psych: calm open mood  /58   Pulse 87   Temp 97.6  F (36.4  C) (Oral)   Resp 16   Wt (!) 219.1 kg (483 lb 1.6 oz)   SpO2 97%   BMI 75.66 kg/m              Data:     Recent Labs   Lab 02/19/23  1152 02/19/23  0738 02/19/23  0649 02/19/23  0210 02/18/23  2144 02/18/23  1742   * 206* 192* 109* 94 98     ROUTINE IP LABS (Last four results)  BMP  Recent Labs   Lab 02/19/23  1152 02/19/23  0738 02/19/23  0649 02/19/23  0210 02/16/23  1656 02/16/23  1253 02/14/23  0807 02/14/23  0715 02/13/23  0723 02/13/23  0647   NA  --   --  137  --   --  134*  --  134*  --  134*   POTASSIUM  --   --  4.7  --   --  4.6  --  4.4  --  4.7   CHLORIDE  --   --  100  --   --  96*  --  96*  --  93*   JUSTIN  --   --  9.1  --   --  9.5  --  9.0  --  9.1   CO2  --   --  28  --   --  30*  --  26  --  30*   BUN  --   --  57.0*  --   --  51.9*  --  70.0*  --  79.4*   CR  --   --  1.49*  --   --  1.27*  --  1.41*  --  1.68*   * 206* 192* 109*   < > 159*   < > 223*   < > 151*    < > = values in this interval not displayed.     CBC  Recent Labs   Lab 02/19/23  1024 02/16/23  1253   WBC 6.4 7.1   RBC 4.45 4.69   HGB 13.0 13.8   HCT 43.5 44.8   MCV 98 96   MCH 29.2 29.4   MCHC 29.9* 30.8*   RDW 16.5* 16.4*    327     INRNo lab  results found in last 7 days.  Lab Results   Component Value Date    AST 37 01/29/2023    AST 13 08/19/2020     Lab Results   Component Value Date    ALT 11 01/29/2023    ALT 30 08/19/2020     Lab Results   Component Value Date    BILICONJ 0.0 10/17/2013      Lab Results   Component Value Date    BILITOTAL 1.4 01/29/2023    BILITOTAL 0.5 08/19/2020     Lab Results   Component Value Date    ALBUMIN 3.1 02/11/2023    ALBUMIN 2.8 06/15/2022    ALBUMIN 2.9 08/19/2020     Lab Results   Component Value Date    PROTTOTAL 7.5 01/29/2023    PROTTOTAL 8.0 08/19/2020      Lab Results   Component Value Date    ALKPHOS 97 01/29/2023    ALKPHOS 73 08/19/2020       35 minutes spent on the date of the encounter doing chart review, history, documentation and further activities per the note        Contacting the Inpatient Diabetes Team   From 8AM-4PM: page inpatient diabetes provider that is following the patient, or utilize the job code paging system.   From 4PM-8AM: page the job code for endocrine fellow on call.       Please use the following job code to reach the Inpatient Diabetes team. Note that you must use an in house phone and that job codes cannot receive text pages.     Dial [phone] (or star-star-star [phone] on the new [employer] telephones), then [phone] to reach the endocrine-diabetes provider on call.  Regina Rah APRN [phone]

## 2023-02-19 NOTE — PROGRESS NOTES
[facility]   Acute Rehabilitation Unit  Daily progress note    INTERVAL HISTORY  Pricilla was seen in her room this am. She complains of posterior right thigh pain. She is worried about it. She also reports increased SOB, orthopnea, and PND. Denies CP. She also denies n/v, fevers, and chills.      MEDICATIONS    apixaban ANTICOAGULANT  5 mg Oral BID     atorvastatin  40 mg Oral Daily     bumetanide  1 mg Oral Daily     diltiazem ER  120 mg Oral BID     gabapentin  300 mg Oral Daily     gabapentin  600 mg Oral BID     hydrALAZINE  10 mg Oral TID     insulin aspart  2-16 Units Subcutaneous TID AC     insulin aspart  2-12 Units Subcutaneous At Bedtime     insulin aspart   Subcutaneous Daily with breakfast     insulin aspart   Subcutaneous Daily with lunch     insulin aspart   Subcutaneous Daily with supper     insulin glargine  50 Units Subcutaneous QAM     levothyroxine  200 mcg Oral Once per day on Mon Tue Wed Thu Fri Sat     levothyroxine  400 mcg Oral Weekly     lidocaine  2 patch Transdermal Q24H     lidocaine   Transdermal Q8H OPHELIA     metoprolol succinate ER  50 mg Oral Daily     miconazole   Topical BID     nicotine  1 patch Transdermal Daily    And     nicotine   Transdermal Q8H OPHELIA     topiramate  25 mg Oral Daily     umeclidinium  1 puff Inhalation Daily        acetaminophen, albuterol, albuterol, sore throat, capsaicin, clotrimazole, glucose **OR** dextrose **OR** glucagon, diclofenac, docusate sodium, hydrALAZINE, hydrOXYzine **OR** hydrOXYzine, insulin aspart, methocarbamol, methyl salicylate-menthol, mineral oil-hydrophilic petrolatum, mineral oil-hydrophilic petrolatum, naloxone **OR** naloxone **OR** naloxone **OR** naloxone, oxyCODONE, - MEDICATION INSTRUCTIONS -, polyethylene glycol, simethicone     PHYSICAL EXAM  /58   Pulse 87   Temp 97.6  F (36.4  C) (Oral)   Resp 16   Wt (!) 219.1 kg (483 lb 1.6 oz)   SpO2 97%   BMI 75.66 kg/m     Gen: Awake,  coooperative  HEENT: atraumatic, no discharge from nares or ears  Cardio: RRR  Pulm: Non-labored breathing, crackles at lung bases  Abd: Soft, non-tender to palpation, bowel sounds present  Ext: no calf tenderness, tenderness to palpation in posterior portion of right thigh  Neuro/MSK: alert, oriented, answers questions appropriately, follows commands      LABS  Last Basic Metabolic Panel:  Recent Labs   Lab Test 02/19/23  1152 02/19/23  0738 02/19/23  0649 02/16/23  1656 02/16/23  1253 02/14/23  0807 02/14/23  0715   NA  --   --  137  --  134*  --  134*   POTASSIUM  --   --  4.7  --  4.6  --  4.4   CHLORIDE  --   --  100  --  96*  --  96*   CO2  --   --  28  --  30*  --  26   ANIONGAP  --   --  9  --  8  --  12   * 206* 192*   < > 159*   < > 223*   BUN  --   --  57.0*  --  51.9*  --  70.0*   CR  --   --  1.49*  --  1.27*  --  1.41*   GFRESTIMATED  --   --  43*  --  52*  --  46*   JUSTIN  --   --  9.1  --  9.5  --  9.0    < > = values in this interval not displayed.     CBC RESULTS:   Recent Labs   Lab Test 02/19/23  1024 02/16/23  1253 02/12/23  0346   WBC 6.4 7.1 7.4   RBC 4.45 4.69 4.49   HGB 13.0 13.8 13.2   HCT 43.5 44.8 42.8   MCV 98 96 95   MCH 29.2 29.4 29.4   MCHC 29.9* 30.8* 30.8*   RDW 16.5* 16.4* 16.6*    327 346       Rehabilitation - continue comprehensive acute inpatient rehabilitation program with multidisciplinary approach including therapies, rehab nursing, and physiatry following. See interval history for updates.      ASSESSMENT AND PLAN  Pricilla Brown is a 48 year old female with a past medical history of chronic HFrEF 2/2 NICM, persistent atrial fibrillation (on Eliquis), asthma, diabetes mellitus type II, hypertension, obesity, hypothyroidism 2/2 radioablation for Graves disease, JYOTI, CKD, and recent admission for HF exacerbation (12/25/22-1/1/23) who was admitted on 1/23/23 with heart failure exacerbation with hospital course complicated by encephalopathy, hypercarbia, NIRAJ,  hyperkalemia, labile blood glucose, UTI, dry gangrene of toes on right foot, and pain.  She is now admitted to ARU on 2/14/23 for multidisciplinary rehabilitation and ongoing medical management. Admission to acute inpatient rehab 02/14/23.      --Vitals stable  --Refer to medicine note about CHF and renal management and to endo note for diabetic management  --Pt with right posterior thigh pain today; US and XR neg for DVT or signs of other acute concerns (eg cellulitis, abscess, fracture); likely MSK in nature, continue therapies for stretching and heat applications helping  --UA ordered by medicine team on Sunday demonstrating UTI; cipro 500mg BID 5 day course ordered as empiric txt, adjust as necessary with sensitivity results  --Continue ongoing medical management  --Continue therapies and plan of care      Regina Martinez MD    Physical Medicine & Rehabilitation

## 2023-02-19 NOTE — PLAN OF CARE
Goal Outcome Evaluation:    Overall Patient Progress: no change    Outcome Evaluation: No change in Pt progress this shift.    Pt is alert and oriented. Can take pills whole. On 2L of oxygen this shift via NC. PW on NOC. Pt encouraged to void after random scan of 965mL and had an output of 400mL. Pt was resistant to ISC, educated Pt on importance of ISC and emptying the bladder. Encouraged Pt to double void and had a PVR of 250mL. BG was 109. C/o pain; given PRN pain medications towards the end of the shift - oncoming nurse notified and will continue to monitor. Call light within reach and bed alarms on. Will continue with POC.

## 2023-02-20 NOTE — PLAN OF CARE
FOCUS/GOAL  Medical management    ASSESSMENT, INTERVENTIONS AND CONTINUING PLAN FOR GOAL:  Vitals stable. Reporting unbearable pain on her legs. Received PRN twice this shift with some relief. Expressed the desire to go out and smoke while she is on Nicotine patch. Provider informed. Started on Nicotine gum, received first dose today.  Foot seen by WOC this morning. Nursing to continue with current treatment. Bumex reestablish by Provider, 1 mg given toward the end of the shift. Good appetite ate 100% of her meals. Correction given.  Compliant with fluid restriction, received 300 ml this shift. Voided toward the end of the shift will in therapy. Unable to establish the amount as hot overflowed. LBM 2/17 PRN Miralax given. Provider solicited to schedule Stool softer is possible.COVID test collected and sent to lab this morning. Result still pending. Will continue with POC.  Goal Outcome Evaluation:      Plan of Care Reviewed With: patient    Overall Patient Progress: no changeOverall Patient Progress: no change    Outcome Evaluation: Pain managed with scheduled and PRN medication. Received PRN twice this shift.

## 2023-02-20 NOTE — PROGRESS NOTES
"  [facility]   Acute Rehabilitation Unit  Daily progress note    INTERVAL HISTORY  Weekend and therapy notes reviewed, no acute events reported.  Continued to complain of pain in right posterior thigh.  Xray and U/S without acute findings to explain.  Also with uptrending Cr, increased diuresis per IM.  UA also concerning for UTI, started on empiric antibiotics.      Pricilla Brown was seen and examined at bedside this morning.  She reports that her pain has been improved, though still most bothersome in posterior right thigh.  States this pain can \"shoot\" up to her back, but no longer on left side and now does not radiate to lower part of right leg.  States intensity is decreased and she is able to participate more in therapy.  She also feels she is having more shortness of breath while lying flat and is worried she is \"picking up fluid\".  She notes a \"scratchy\" throat, feeling chilled today.  She has note had a BM in several days.  Denies abdominal pain, nausea, vomiting.  Feels she is eating and drinking fine.  Notes some ongoing issues with voiding (urine), states when she does go it can be quite a large amount.  She is also noting some cravings for cigarettes, initially stating she was planning to go outside to smoke but later \"changed her mind\".    Functionally, she is needing SBA for rolling, min A for supine<>sit, CGA to SBA for transfers with FWW.  She was able to ambulate in her room with SBA and FWW.  She needs mod A for dressing, dependent for lower body dressing.    MEDICATIONS    apixaban ANTICOAGULANT  5 mg Oral BID     atorvastatin  40 mg Oral Daily     bumetanide  1 mg Oral Daily     ciprofloxacin  500 mg Oral Q12H [facility] (08/20)     diltiazem ER  120 mg Oral BID     gabapentin  300 mg Oral Daily     gabapentin  600 mg Oral BID     hydrALAZINE  10 mg Oral TID     insulin aspart  1-14 Units Subcutaneous TID AC     insulin aspart  1-11 Units Subcutaneous At Bedtime "     insulin aspart   Subcutaneous Daily with breakfast     insulin aspart   Subcutaneous Daily with lunch     insulin aspart   Subcutaneous Daily with supper     insulin glargine  45 Units Subcutaneous QAM     levothyroxine  200 mcg Oral Once per day on Mon Tue Wed Thu Fri Sat     levothyroxine  400 mcg Oral Weekly     lidocaine  2 patch Transdermal Q24H     lidocaine   Transdermal Q8H [facility]     metoprolol succinate ER  50 mg Oral Daily     miconazole   Topical BID     nicotine  1 patch Transdermal Daily    And     nicotine   Transdermal Q8H OPHELIA     topiramate  25 mg Oral Daily     umeclidinium  1 puff Inhalation Daily        acetaminophen, albuterol, albuterol, sore throat, capsaicin, clotrimazole, glucose **OR** dextrose **OR** glucagon, diclofenac, docusate sodium, hydrALAZINE, hydrOXYzine **OR** hydrOXYzine, insulin aspart, methocarbamol, methyl salicylate-menthol, mineral oil-hydrophilic petrolatum, mineral oil-hydrophilic petrolatum, naloxone **OR** naloxone **OR** naloxone **OR** naloxone, oxyCODONE, - MEDICATION INSTRUCTIONS -, polyethylene glycol, simethicone     PHYSICAL EXAM  /55 (BP Location: Right arm)   Pulse 96   Temp 97  F (36.1  C) (Oral)   Resp 16   Wt (!) 219.1 kg (483 lb 1.6 oz)   SpO2 97%   BMI 75.66 kg/m     Gen: NAD, sitting up in chair  HEENT: NC/AT, MMM  Cardio: irregularly irregular, no murmurs appreciated  Pulm: non-labored on room air, lungs CTA bilaterally  Abd: obese, soft, non-tender, bowel sounds present  Ext: no pitting edema in bilat lower extremities  Neuro/MSK: awake, alert, PERRL, EOMI, face symmetric, moving all extremities in chair    LABS  Last Basic Metabolic Panel:  Recent Labs   Lab Test 02/20/23  0215 02/19/23  2159 02/19/23  1742 02/19/23  0738 02/19/23  0649 02/16/23  1656 02/16/23  1253 02/14/23  0807 02/14/23  0715   NA  --   --   --   --  137  --  134*  --  134*   POTASSIUM  --   --   --   --  4.7  --  4.6  --  4.4   CHLORIDE  --   --   --   --  100  --  96*   --  96*   CO2  --   --   --   --  28  --  30*  --  26   ANIONGAP  --   --   --   --  9  --  8  --  12   * 91 100*   < > 192*   < > 159*   < > 223*   BUN  --   --   --   --  57.0*  --  51.9*  --  70.0*   CR  --   --   --   --  1.49*  --  1.27*  --  1.41*   GFRESTIMATED  --   --   --   --  43*  --  52*  --  46*   JUSTIN  --   --   --   --  9.1  --  9.5  --  9.0    < > = values in this interval not displayed.     CBC RESULTS:   Recent Labs   Lab Test 02/19/23  1024 02/16/23  1253 02/12/23  0346   WBC 6.4 7.1 7.4   RBC 4.45 4.69 4.49   HGB 13.0 13.8 13.2   HCT 43.5 44.8 42.8   MCV 98 96 95   MCH 29.2 29.4 29.4   MCHC 29.9* 30.8* 30.8*   RDW 16.5* 16.4* 16.6*    327 346       Rehabilitation - continue comprehensive acute inpatient rehabilitation program with multidisciplinary approach including therapies, rehab nursing, and physiatry following. See interval history for updates.      ASSESSMENT AND PLAN  Pricilla Brown is a 48 year old female with a past medical history of chronic HFrEF 2/2 NICM, persistent atrial fibrillation (on Eliquis), asthma, diabetes mellitus type II, hypertension, obesity, hypothyroidism 2/2 radioablation for Graves disease, JYOTI, CKD, and recent admission for HF exacerbation (12/25/22-1/1/23) who was admitted on 1/23/23 with heart failure exacerbation with hospital course complicated by encephalopathy, hypercarbia, NIRAJ, hyperkalemia, labile blood glucose, UTI, dry gangrene of toes on right foot, and pain.  She is now admitted to ARU on 2/14/23 for multidisciplinary rehabilitation and ongoing medical management.        Admission to acute inpatient rehab 02/14/23.    Impairment group code: 09 Cardiac -  heart failure exacerbation        1. PT and OT 90 minutes of each on a daily basis, in addition to rehab nursing and close management of physiatrist.       2. Impairment of ADL's: Noted to have impaired activity tolerance, impaired balance, impaired ROM, impaired sensation, impaired  strength and pain, all affecting her ability to safely and independently perform basic ADLs.  Goal for mod I with basic ADLs with assist for IADLs and bathing as PTA.     3. Impairment of mobility:  Noted to have impaired activity tolerance, impaired balance, impaired ROM, impaired sensation, impaired strength and pain, all affecting her ability to safely and independently perform basic mobility.  Goal for mod I with basic mobility.     4. Medical Conditions  New actions/orders/updates for today are in blue.     Acute on Chronic Systolic Heart Failure (EF 40-45%)  Long-Standing Persistent Atrial Fibrllation  Hypertension   Troponin Elevation 2/2 CHF  [PTA meds: apixaban 5 mg BID, atorvastatin 40 mg daily, Bumex 5 mg BID, hydralazine 12.5 mg TID, hydrochlorothiazide 12.5 mg BID, isosorbide dinitrate 20 mg TID, Toprol XL 75 mg daily, spironolactone 50 mg daily]  Pt admitted with c/o increasing SOB, MURRAY, chest tightness, weight gain in spite of compliance with PO diuretics. NT BNP 5891 (was 2030 during most recent admission).  Lactic acid 2.2 on admission with improvement to 1.1 with diuresis. Troponin mildly elevated, 67-->75-->60 likely 2/2 CHF exacerbation; no concern for ACS currently. Most recent Echo with EF 40-45%. Coronary angiogram 2012 with no significant CAD. Of note, pt recently admitted 12/25-1/1 HF exacerbation, diuresed to 472lbs. Pt weight on admission 1/23 555lbs and down to 450lbs on discharge to ARU.  Vitals:    02/16/23 0610 02/17/23 0645 02/18/23 0220 02/19/23 1100   Weight: (!) 205 kg (452 lb) (!) 214.6 kg (473 lb 3.2 oz) (!) 223.6 kg (492 lb 14.4 oz) (!) 219.1 kg (483 lb 1.6 oz)   - Continue Bumex 1 mg daily, with additional prn dose for weight gain   - Per cardiology, suspect EDW is around 445-450 lbs.  Monitor daily weights.  Call [facility] clinic if weight increases 5 lbs in a day two days in a row or 10 lbs in 1 week.  Weight trends have been up and down, difficulty evaluating accuracy of bed  weights, discussed with nursing.  S/p extra doses of Bumex on 2/18, 2/19, and 2/20.  Discussed with IM as patient was on much higher doses of Bumex PTA (5 mg BID) yet still presented with volume overload.  If continues to have uptrending weight or getting extra Bumex doses, will curbside vs consult cardiology.  - Continue I/O, low Na diet, 2L fluid restriction   - BB: Continue Toprol XL 25mg daily  - ACEi/ARB/ARNI: Discontinued Lisinopril d/t allergy, if BP elevated, can trial Losartan    - SGLT2i: Contraindicated with recurrent yeast infections   - Aldosterone Antagonist: Discontinued Aldactone due to NIRAJ, hyperkalemia  - Rate Control: BB as above. Given mildly reduced EF, ok to use Diltiazem 120 mg BID  - Anticoagulation: CHADSVASC 3; Continue PTA apixaban  - Afterload reduction: Continue hydralazine 10 mg TID (hold if SBP <110), can give additional PRN dose if SBP >180  - [facility] consulted; follow up after ARU discharge.  Plan to discuss CardioMEMS at this appointment.  Currently scheduled on 2/24.     Chronic Kidney Disease Stage 3b  NIRAJ, improving  Longstanding history of diabetes and HTN. Baseline Cr ~1.2-1.4. Cr on admission 1.56. During hospitalization, patient diuresed to 427 lbs with NIRAJ, Cr peak 2.1. After backing off on diuretics, Cr improved to 1.4 on 2/14 at admission to ARU  - Avoid nephrotoxic agents  - Resume diuretics as listed above  - Trend BMP every M/Th.  Cr uptrending to 1.49 on 2/19.  2/20: labs still pending     Hyperkalemia, resolved  Potassium continually rising since 2/9 with peak 2/11 @ 7.3.  EKG without acute changes, rhythm stable in a fib.  Nephrology consulted.  Potassium shifted with insulin, calcium gluconate for cardiac stability, lokelma 15 g X 1, IV Bumex total of 6 mg on 2/11/23.  Spironolactone discontinued.  - Low K diet  - I/O  - Continue to hold spironolactone, per nephrology can consider resuming once Cr stable  - Diuresis as above  - Trend BMP every M/Th.  2/20: labs still  "pending     Hyponatremia  In setting of aggressive diuresis.  Now mild, improved to 134 on 2/14.  - Trend BMP every M/Th.  2/20: labs still pending     Hypothyroidism 2/2 below  Graves Disease s/p Radioiodine Ablation  TSH 20.4 and free T4 0.82 on admission. Endocrine consulted.  - Continue levothyroxine 200 mcg M-Sat + 400 mcg on Sunday.    - Recheck TFT in a month     Type 2 Diabetes Mellitus, insulin-dependent, uncontrolled  Hyperglycemia  Diabetic neuropathy  A1c 13.5% on 12/25/2022. Discrepancies in patient's home regimen.  Per endo, PTA on \"Toujeo 225 units daily, novolog 100 units with each meal + CS, and ozempic 1 mg weekly. She could not tolerate empaglifozin due to frequent UTIs and metformin due to GI side effects.\" Endocrinology consulted during admission.  Insulin gtt started 2/1 per endocrinology, transitioned back to subcutaneous lantus and novolog on 2/3/23.   Recent Labs   Lab 02/20/23  0215 02/19/23  2159 02/19/23  1742 02/19/23  1152 02/19/23  0738 02/19/23  0649   * 91 100* 137* 206* 192*   - Monitor BG TIC AC + HS + 0200  - Endocrinology continuing to follow, appreciate assistance.  Per their recs on 2/20:   - Decrease Lantus to 45 units qAM  - Continue Novolog custom (2/30) sliding scale insulin TID AC and HS  - Continue Novolog 1:5g CHO TID with meals and PRN with snacks/supplements  - Unable to resume PTA Ozepmic at ARU admission (not on formulary; held due to recent NIRAJ).  Can consider substitution with liraglutide if indicated.  - Hypoglycemia protocol  - OP follow up Thelma Archibald/Dr Silva Damon, scheduled on 3/17     Necrotic wounds of Right Toes 1, 3, and 4, resolved  Present on admission, pt unaware how long wounds have been present.  Vascular consulted; JOSETTE's completed showing patent vessels and normal waveforms. They recommend orthopedic foot and ankle consult.  Ortho consulted, felt consistent with dry gangrene, plan for wound care and follow-up as outpatient.   WOCN " following as inpatient and provided wound care recs.  - WOCN consulted at ARU to continue to follow, appreciate assistance.  WOCN assessed toes on 2/15, debrided devitalized skin from tips of digits 1 and 4, intact skin beneath with no open wounds, no evidence of dry gangrene.  Did lose toenails on those digits.  - Wound cares updated per [facility] recs: Bilateral toes and feet: Wash daily with warm water, soap and a washcloth, taking particular attention to wash well between and under toes. Dry thoroughly with a clean towel. Moisturize skin on bilateral feet and legs with Sween 24. [place] missing toenail beds on great and 4th toes with betadine.  Also needs consistent foot cares.  - WBAT  - Follow up with podiatry as outpatient (rescheduled from 2/17 to 3/3 with Dr. Tosin haro still anticipate at ARU)     Urinary Retention  Phan catheter placed on admission for strict I/O while aggressively diuresing. Removed 2/11, pt with urinary retention and straight cath x 3, Phan replaced. Urology curbside, continue phan for now, can re-attempt voiding trial as OP.  Per patient request, with improving transfers to facilitate toileting needs, phan removed on 2/17 for trial of void.    - Is voiding, but only a few times per day with quite large volumes (800-1000 mL at times), concerned she may still be retaining, especially in setting of recent NIRAJ.  Treating UTI as below.  Need to resume regular bladder scanning if no void for 4 hours and for PVRs.  Implement timed toileting.  If ongoing retention once UTI treated, would need to consider replacing phan.  - If ongoing retention or phan replaced, follow up with OP Urology    E coli UTI  UA on 2/19 with large leuk, pyuria, bacteriuria.  Started on empiric ciprofloxacin.  - Continue cipro 500 mg BID, plan for 5-day   - UC with >100k colonies E coli.  Sensitivies in progress, follow up results and adjust antibiotics accordingly.     Asthma  Patient endorsing mouth irritation w/  inhaler use. Per inpatient team, roof of mouth erythematous and appears excoriated but w/o evidence of yeast.   - Continue PTA inhalers (levalbuterol and spiriva)  - Magic mouthwash    Tobacco use disorder  - Continue nicotine patch 14 mcg, started during recent (December) admission  - Patient expressing ongoing cravings despite patch, nearly left floor to smoke but convinced otherwise.  Offered PRN nicorette gum for cravings.  Continue to monitor.     JYOTI  - Continue BiPAP at night  - Patient reports that she has not been using BiPAP due to issues with mask, instead using oxygen with sleep.  RT to follow-up with patient regarding fit and appropriate use.    Hyperlipidemia   - Continue PTA Lipitor 40 mg daily     Acute low back pain with bilateral radiculopathy  Chronic Pain  [PTA meds: Tylenol PRN, diclofenac QID PRN, gabapentin 600 mg TID, oxycodone 5 mg 5x/day]  Of note, per PDMP #140 tabs of oxycodone 5 mg were filled on 2/16 while patient hospitalized.  Patient reporting diffuse joint pain while inpatient. Using narcotics along with gabapentin, topical nsaids, and muscle relaxants. Aggressive diuresis vs diabetic neuropathy.  Pain management curbside consulted 1/31 while inpatient; decrease gabapentin to BID, stopped flexeril and added robaxin.  Pain improved initially at ARU admission but significantly increased pain in bilateral low back radiating to bilateral buttocks and posterior legs to feet.  Decreased with ambulation, but increased with any movement in bed.  Lumbar/thoracic xrays with grossly maintained vertebral height, no evidence of fracture.  Right thigh and hip xray with no evidence of fracture, mild degenerative changes.  US RLE negative for DVT.    - Continue PRN Tylenol, voltaren, capsaicin, robaxin, hydroxyzine, Bengay, lidocaine patch  - Continue gabapentin 600-300-600 mg.  Consider resuming  mg TID as indicated  - Continue Topamax 25 mg daily  - Continue oxycodone 5 mg q6h PRN  - Pain  "improved today.  Still in posterior right thigh, radiating to low back at times but not to lower part of leg and no longer on left side.  Continue stretching and modalities in addition to above pharmacologic interventions.     Morbid obesity  Estimated body mass index is 75.66 kg/m  as calculated from the following:    Height as of 1/23/23: 1.702 m (5' 7\").    Weight as of this encounter: 219.1 kg (483 lb 1.6 oz).  - Complicates rehab/cares    Possible COVID-19 exposure  Per ID, patient was previously on unit with high prevalence of COVID-19.  Tested negative on 2/15.  Infection prevention recommending repeat testing to confirm negative at 5-7 days.  Ordered on 2/16 by ID provider; patient declining despite education.  Afebrile, no signs/symptoms of COVID infection.        5. Adjustment to disability:  Clinical psychology to eval and treat if indicated  6. FEN: mod cons CHO, 2g NA, 2g K, 2L fluid restriction  7. Bowel: continent, monitor, PRN bowel meds available  8. Bladder: phan removed on 2/17 for TOV as above  9. DVT Prophylaxis: apixaban  10. GI Prophylaxis: not indicated  11. Code: full  12. Disposition: goal for home  13. ELOS:  10-14 days  14. Follow up Appointments on Discharge: PCP in 1-2 weeks, cardiology ([facility], scheduled 2/24), endocrinology (Thelma Archibald/Dr Silva Damon on 3/17), urology, ortho/podiatry (3/3 with Dr. Leahy)        Patient discussed with Dr. Vladislav Vazquez, PM&R Staff Physician and Dania Humphries, FREDDY ADALBERTO    Lulu Guerrero PA-C  Physical Medicine & Rehabilitation     "

## 2023-02-20 NOTE — PROGRESS NOTES
"SPIRITUAL HEALTH SERVICES Progress Note  [facility] ([facility]) Acute Rehab    Saw pt Pricilla Brown as a length of stay  visit, to assess needs and offer support. Pricilla had not requested  support during this admission, but had enjoyed a number of  visits during her admission on [facility]. She welcomed  support today, saying \"thank you for coming to see me; I didn't know they had chaplains over here.\"    Patient/Family Understanding of Illness and Goals of Care - pt updated me regarding her history of health issues and events leading up to her hospitalization on [facility]. She talked in particular about \"being in bed a good number of days, so it made me really weak and hard for me to walk, so that's why I'm here on the rehab unit...\"     Distress and Loss - pt talked about the difficulties \"of putting on so much water weight - that's improved but I was really not doing well for a while there\". Pt also talked about her desire to quit smoking, \"but it's really hard. I know it's not good for me.\" Pt did say she \"was pleased that something told me, when I wanted to go out to smoke, that it really wasn't a good idea, that it's really hurting me...\"     Strengths, Coping, and Resources  - pt spoke particularly of her Buddhist diamond as being a support for her.     Meaning, Beliefs, and Spirituality - Christian Buddhist. Pt requested prayers, and said she would very much appreciate ongoing  support. Pt requested \"some sort of devotional resources, if you have anything like that...\"    Plan of Care - continue to follow, will visit at least weekly while pt on unit. I will bring a devotional resource to pt on Wednesday (a large-print scripture/prayer/hymns resource).    Nathan Naranjo M.Div (Bill)., [facility]  Staff   Pager [phone]    * [facility] remains available 24/7 for emergent requests/referrals, either by having the switchboard page the on-call  or by entering an " ASAP/STAT consult in Epic (this will also page the on-call ). Routine Epic consults receive an initial response within 24 hours.*

## 2023-02-20 NOTE — PLAN OF CARE
Discharge Planner Post-Acute Rehab PT:      Discharge Plan: Home (apt) 5 OCTAVIO with bilat rails, with resumed PCA services, home PT     Precautions: falls     Current Status:  Bed Mobility: SBA rolling L/R. Min A supine<>sit  Transfer: CGA/SBA with FWW  Gait: unable to tolerated due to R leg pain.   Stairs: NT  Balance: needs heavy UE support in standing     Assessment: pt down for x-ray and Us today, all (-) pt still limited by pain. Pt unable to tolerated STS or amb today, pt only able to tolerated seated TE. Pt was up in power recliner at start of PT session needing to be lifted to chair. Pt able to tolerated standing only today form 20 to 40sec.   Pt attempted step up in stairwell. Pt only able to demo one step up with L, pt very excited that was able to get foot up to step. Cont to be limited by R hamstring pain.   Other Barriers to Discharge (DME, Family Training, etc):   Stairs to enter home- pt [facility] needed   Pt has 4WW

## 2023-02-20 NOTE — PLAN OF CARE
Goal Outcome Evaluation:    Overall Patient Progress: no change    Outcome Evaluation: No change in Pt progress this shift.    Pt is alert and oriented. On 2L of oxygen via NC. PW on NOC. Ax1 SPT. C/o pain; given PRN pain medications with some relief. BG was 106. Call light within reach and bed alarms on. Pt slept through this shift. Will continue with POC.

## 2023-02-20 NOTE — PROGRESS NOTES
[facility]  WO Nurse Inpatient Assessment     Consulted for: Bilateral feet    Patient History (according to provider note(s):      Per Dr Vladislav Vazquez on 2/14/2023: Pricilla Brown is a 48 year old female with a past medical history of chronic HFrEF 2/2 NICM, persistent atrial fibrillation (on Eliquis), asthma, diabetes mellitus type II, hypertension, obesity, hypothyroidism 2/2 radioablation for Graves disease, JYOTI, CKD, and recent admission for HF exacerbation (12/25/22-1/1/23) who was admitted on 1/23/23 with heart failure exacerbation with hospital course complicated by encephalopathy, hypercarbia, NIRAJ, hyperkalemia, labile blood glucose, UTI, dry gangrene of toes on right foot, and pain.  She is now admitted to ARU on 2/14/23 for multidisciplinary rehabilitation and ongoing medical management.    Areas Assessed:      Areas visualized during today's visit: Bilateral feet    Wound location: Bilateral feet and toes     2/15 Right                                                        2/15 Left    Per H&P: Present on admission, pt unaware how long wounds have been present.  Vascular consulted; JOSETTE's completed showing patent vessels and normal waveforms. They recommend orthopedic foot and ankle consult.  Ortho consulted, plan for wound care and follow-up as outpatient.   WOCN following as inpatient and provided wound care recs.    On [facility] assessment today, bilateral feet and toes washed well with soap and water to help remove devitalized skin. Devitalized skin trimmed away from dorsal and plantar aspects of the feet. On the bilateral toes, devitalized epidermis peeled away easily to reveal intact, pink skin. Specifically, on right great and 4th toes, necrotic caps pulled away with nail attached to reveal intact, pink epidermis.     Currently, the only open wounds present are on the nailbeds of the great and 4th toes. Remainder is skin is intact, soft, warm to touch,  no evidence of dry gangrene.     Patient will benefit from consistent foot care.    2/20: Toenails remain open, now using mepilex while socks on to prevent lint getting in wounds. Improved skin on BLE.     Treatment Plan:     Bilateral toes and feet: Wash daily with warm water, soap and a washcloth, taking particular attention to wash well between and under toes. Dry thoroughly with a clean towel. Moisturize skin on bilateral feet and legs with Sween 24. [place] missing toenail beds on great and 4th toes with betadine.      Orders: Written    RECOMMEND PRIMARY TEAM ORDER: None, at this time  Education provided: plan of care  Discussed plan of care with: Patient, Nurse and Physician  [facility] nurse follow-up plan: weekly  Notify WOC if wound(s) deteriorate.  Nursing to notify the Provider(s) and re-consult the [facility] Nurse if new skin concern.    DATA:     Current support surface: Standard  Low air loss (PATY pump, Isolibrium, Pulsate, skin guard, etc): discussed with RN that patient does not have any pressure injuries or open wounds. According to the bed algorithm, patient can use a standard bariatric mattress   Containment of urine/stool: Continent of bowel and Indwelling catheter  BMI: Body mass index is 75.66 kg/m .   Active diet order: Orders Placed This Encounter      Combination Diet 2 gm K Diet; Moderate Consistent Carb (60 g CHO per Meal) Diet; 2 gm NA Diet     Output: I/O last 3 completed shifts:  In: 795 [P.O.:795]  Out: 3075 [Urine:3075]     Labs:   Recent Labs   Lab 02/19/23  1024 02/19/23  0649   ALBUMIN  --  2.9*   HGB 13.0  --    WBC 6.4  --      Pressure injury risk assessment:   Sensory Perception: 3-->slightly limited  Moisture: 2-->very moist  Activity: 2-->chairfast  Mobility: 2-->very limited  Nutrition: 3-->adequate  Friction and Shear: 2-->potential problem  Shane Score: 14    Jody Bailey, RN BSN CWOCN  Contact through Vocera: [facility] Nurse ([facility])  Vocera group: [facility] Nurse  Dept. Office Number:  [phone]

## 2023-02-20 NOTE — PLAN OF CARE
Orientation: A/Ox4, Pt slept for most of evening after dinner  Bowel: mixed continence per report, smear of stool x2 this shift  Bladder: mixed continence using purewick overnight  Pain: Endorses pain in her back and b/l legs, requested and received prn oxycodone and tylenol x1  Ambulation/Transfers: not oob this shift, requires A1-2 for bed mobility  Blood sugars: see results, no concerns at this time, snack provided at hs   Diet/ Liquids: Reg/thin/pills whole, 2 L fluid restriction, low Na+, renal diet  Oxygen: 2 LPM via NC at hs  Skin: No new concerns, lymph wraps removed at hs per orders, lotion applied to legs   Bed alarm on for safety, call light within reach.

## 2023-02-20 NOTE — PROGRESS NOTES
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  She is now admitted to ARU on 2/14/23 for rehabilitation.     Assessment:   1)Type II Diabetes Mellitus, uncontrolled (A1c 13.5%), with insulin resistance.    Plan:    - Decreased lantus to 45 units  (may change back to Tuojeo on discharge)   -Novolog increased to uniformly 1 per 4 grams with breakfast and lunch.  Continue 1:5 with dinner and snacks.--->And for snacks, admin instruction reads \"For use during waking hours.  Patient may have 0200 snack withOUT aspart coverage to limit low BG risk while sleeping.\"     -Continue Novolog custom 2/30 sliding scale TID AC and HS  For BG >140 and 200 at bedtime   -BG monitoring TID AC, HS, 0200   -holding PTA Ozempic, due to NIRAJ. Once renal function stable we could resume (or substitute with Victoza while at rehab)   (Jardiance was stopped due to UTIs)   -hypoglycemia protocol   -carb counting protocol   -diabetes education needs will be assessed closer to discharge.     Outpatient follow up: with [facility] , Dr hogue on 3/17/23.    Plan discussed with patient    Interval History and Assessment: interval glucose trend reviewed:   Continues with more activity.  Creat increasedon 2/19 but no creat today.  Likely from diuresis.Increased BG at lunch/dinner so changed cho coverage back to 1:4 for these meals. Decreased lantus to 45 units today--bg 137 -106  But " "BG elevated so increased Lantus back to 47 tomorrow.    Per previous note:  She is ordering meals with room service-- this seems really  Helpful given her multiple restrictions.      Per previous note:  Snacks at 10AM, 8PM added on 2/15, to help Pricilla resist getting outside foods brought in. HS sliding scale order updated with request to move HS BG check and sliding scale to 2300, if she has her snack (sandwich).          Current nutritional intake and type: Orders Placed This Encounter      Combination Diet 2 gm K Diet; Moderate Consistent Carb (60 g CHO per Meal) Diet; 2 gm NA Diet      PTA Diabetes Regimen:   Tuojeo 40 unit(s) once daily at HS  Aspart 10 units with each meal + additional correction scale insulin as follows based on pre-meal glucose      -140 - 160: + 1 unit     -161 - 180: + 2 units     -181 - 200: + 3 units, and so on (sensitivity of 20 mg/dL)     Ozempic 1 mg weekly - she reports she did NOT resume as instructed     BG monitoring frequency:  3-4 times per day  BG trends at home: \"all over\" but mostly between 85 - 300      Historical:   Tuojeo (glargine U300) 225 units once daily  Novolog 100  Units with meals, plus 1/15 correction scale  Trulicity 1mg weekly on Mondays  Jardiance 25 mg daily AM - stopped 2/2 UTI's   (has not tolerated Metformin in the past)    Discharge Planning: TBD, Estimated length of stay: 20 days            Diabetes History:   Type of Diabetes: Type 2 Diabetes Mellitus  Lab Results   Component Value Date    A1C 13.5 12/25/2022    A1C >15.0 07/23/2021    A1C >14.0 08/19/2020    A1C 8.4 10/11/2018    A1C 9.6 05/29/2018    A1C 9.0 04/07/2017    A1C 12.1 03/10/2016              Review of Systems:     The Review of Systems is negative other than noted in the Interval History.           Medications:     Current Facility-Administered Medications   Medication     acetaminophen (TYLENOL) tablet 650 mg     albuterol (PROVENTIL HFA/VENTOLIN HFA) inhaler     albuterol (PROVENTIL) neb " solution 2.5 mg     apixaban ANTICOAGULANT (ELIQUIS) tablet 5 mg     atorvastatin (LIPITOR) tablet 40 mg     benzocaine-menthol (CHLORASEPTIC) 6-10 MG lozenge 1 lozenge     bumetanide (BUMEX) tablet 1 mg     capsaicin (ZOSTRIX) 0.025 % cream 1 applicator     ciprofloxacin (CIPRO) tablet 500 mg     clotrimazole (LOTRIMIN) 1 % cream     glucose gel 15-30 g    Or     dextrose 50 % injection 25-50 mL    Or     glucagon injection 1 mg     diclofenac (VOLTAREN) 1 % topical gel 2 g     diltiazem ER (CARDIZEM SR) 12 hr capsule 120 mg     docusate sodium (COLACE) capsule 100 mg     gabapentin (NEURONTIN) capsule 300 mg     gabapentin (NEURONTIN) capsule 600 mg     hydrALAZINE (APRESOLINE) tablet 10 mg     hydrALAZINE (APRESOLINE) tablet 10 mg     hydrOXYzine (ATARAX) tablet 25 mg    Or     hydrOXYzine (ATARAX) tablet 50 mg     insulin aspart (NovoLOG) injection (RAPID ACTING)     insulin aspart (NovoLOG) injection (RAPID ACTING)     insulin aspart (NovoLOG) injection (RAPID ACTING)     insulin aspart (NovoLOG) injection (RAPID ACTING)     insulin aspart (NovoLOG) injection (RAPID ACTING)     insulin aspart (NovoLOG) injection (RAPID ACTING)     insulin glargine (LANTUS PEN) injection 45 Units     levothyroxine (SYNTHROID/LEVOTHROID) tablet 200 mcg     levothyroxine (SYNTHROID/LEVOTHROID) tablet 400 mcg     Lidocaine (LIDOCARE) 4 % Patch 2 patch     lidocaine patch in PLACE     methocarbamol (ROBAXIN) tablet 500 mg     methyl salicylate-menthol (ICY HOT) ointment     metoprolol succinate ER (TOPROL XL) 24 hr tablet 50 mg     miconazole (MICATIN) 2 % powder     mineral oil-hydrophilic petrolatum (AQUAPHOR)     mineral oil-hydrophilic petrolatum (AQUAPHOR)     naloxone (NARCAN) injection 0.2 mg    Or     naloxone (NARCAN) injection 0.4 mg    Or     naloxone (NARCAN) injection 0.2 mg    Or     naloxone (NARCAN) injection 0.4 mg     nicotine (NICODERM CQ) 14 MG/24HR 24 hr patch 1 patch    And     nicotine Patch in Place      oxyCODONE (ROXICODONE) tablet 5 mg     Patient is already receiving anticoagulation with heparin, enoxaparin (LOVENOX), warfarin (COUMADIN)  or other anticoagulant medication     polyethylene glycol (MIRALAX) Packet 17 g     simethicone (MYLICON) chewable tablet 80 mg     topiramate (TOPAMAX) tablet 25 mg     umeclidinium (INCRUSE ELLIPTA) 62.5 MCG/ACT inhaler 1 puff     Facility-Administered Medications Ordered in Other Encounters   Medication     sodium chloride (PF) 0.9% PF flush 10 mL            Physical Exam:    /55 (BP Location: Right arm)   Pulse 96   Temp 97  F (36.1  C) (Oral)   Resp 16   Wt (!) 219.1 kg (483 lb 1.6 oz)   SpO2 97%   BMI 75.66 kg/m    Spoke with Pricilla on the pone    Gen: Alert  HEENT: NC/AT hearing intact to conversational volume  Resp: Unlabored  Neuro: oriented x3, communicating clearly  Psych: calm open mood          Data:     Recent Labs   Lab 02/20/23 0215 02/19/23 2159 02/19/23 1742 02/19/23  1152 02/19/23  0738 02/19/23  0649   * 91 100* 137* 206* 192*     ROUTINE IP LABS (Last four results)  BMP  Recent Labs   Lab 02/20/23 0215 02/19/23 2159 02/19/23 1742 02/19/23  1152 02/19/23  0738 02/19/23  0649 02/16/23  1656 02/16/23  1253 02/14/23  0807 02/14/23  0715   NA  --   --   --   --   --  137  --  134*  --  134*   POTASSIUM  --   --   --   --   --  4.7  --  4.6  --  4.4   CHLORIDE  --   --   --   --   --  100  --  96*  --  96*   JUSTIN  --   --   --   --   --  9.1  --  9.5  --  9.0   CO2  --   --   --   --   --  28  --  30*  --  26   BUN  --   --   --   --   --  57.0*  --  51.9*  --  70.0*   CR  --   --   --   --   --  1.49*  --  1.27*  --  1.41*   * 91 100* 137*   < > 192*   < > 159*   < > 223*    < > = values in this interval not displayed.     CBC  Recent Labs   Lab 02/19/23  1024 02/16/23  1253   WBC 6.4 7.1   RBC 4.45 4.69   HGB 13.0 13.8   HCT 43.5 44.8   MCV 98 96   MCH 29.2 29.4   MCHC 29.9* 30.8*   RDW 16.5* 16.4*    327     INRNo lab  results found in last 7 days.  Lab Results   Component Value Date    AST 37 01/29/2023    AST 13 08/19/2020     Lab Results   Component Value Date    ALT 11 01/29/2023    ALT 30 08/19/2020     Lab Results   Component Value Date    BILICONJ 0.0 10/17/2013      Lab Results   Component Value Date    BILITOTAL 1.4 01/29/2023    BILITOTAL 0.5 08/19/2020     Lab Results   Component Value Date    ALBUMIN 3.1 02/11/2023    ALBUMIN 2.8 06/15/2022    ALBUMIN 2.9 08/19/2020     Lab Results   Component Value Date    PROTTOTAL 7.5 01/29/2023    PROTTOTAL 8.0 08/19/2020      Lab Results   Component Value Date    ALKPHOS 97 01/29/2023    ALKPHOS 73 08/19/2020       35 minutes spent on the date of the encounter doing chart review, history, documentation and further activities per the note        Contacting the Inpatient Diabetes Team   From 8AM-4PM: page inpatient diabetes provider that is following the patient, or utilize the job code paging system.   From 4PM-8AM: page the job code for endocrine fellow on call.       Please use the following job code to reach the Inpatient Diabetes team. Note that you must use an in house phone and that job codes cannot receive text pages.     Dial [phone] (or star-star-star [phone] on the new Team-Match telephones), then [phone] to reach the endocrine-diabetes provider on call.  Jamilah Prater PA-C  Inpatient Diabetes Service  Pager   [phone]  [place]  2/20/2023

## 2023-02-20 NOTE — PROGRESS NOTES
ARU Progress Note          Assessment & Plan:   Pricilla Brown is a 48 year old female w/ PMH HFmrEF (EF 40-45%) 2/2 NICM, long-standing persistent AF, HTN, type II DM, morbid obesity, asthma, JYOTI and Graves disease s/p radioiodine ablation c/b hypothyroid  admitted on 1/23/2023 to [facility]. She is admitted to ARU on 2/14/23.       #Acute on Chronic Systolic Heart Failure (EF 40-45%)  #Long-Standing Persistent Atrial Fibrllation  #Hypertension   #Troponin Elevation 2/2 CHF, resolved   Pt admitted with c/o increasing SOB, MURRAY, chest tightness, weight gain in spite of compliance with PO diuretics. NT BNP 5891 (was 2030 during most recent admission).  Lactic acid 2.2 on admission with improvement to 1.1 with diuresis. Troponin mildly elevated, 67-->75-->60 likely 2/2 CHF exacerbation; no concern for ACS currently. Most recent Echo with EF 40-45%. Coronary angiogram 2012 with no significant CAD. Of note, pt recently admitted 12/25-1/1 HF exacerbation, diuresed to 472lbs. Pt weight on admission 1/23 555lbs.   - Volume Status: Euvolemic  - Diuresis: PO Bumex 1 mg daily and additional 1mg prn (5lb/24h or 10lb in a week).  Extra dose of Bumex 1 mg given 2/18, 2/19, 2/20.  If continuing to need extra doses, will curbside cardiology.  - Beta Blocker: Continue Toprol XL to 50mg  - ACEi/ARB/ARNI: contraindicated d/t lisinopril allergy.   - SGLT2i: Contraindicated with recurrent yeast infections   - Aldosterone Antagonist: Hold/discontinue PTA Spironolactone 50mg d/t hyperkalemia  - Rate Control: BB as above. Cont diltiazem 120 BID due to persistent tachycardia and borderline reduced EF.   - Afterload reduction: Cont hydralazine 10mg PO TID (hold parameters SBP <110), can give additional prn if sbp >180  - Anticoagulation: CHADSVASC 3; Continue PTA apixaban  - Strict I/O & Daily Weights  - Low Na Diet / 2L Fluid Restriction  - BMP M Th and as needed if new concerns  - [facility] to continue to follow   -  "Cardiology follow up 2/24    #Type 2 Diabetes Mellitus, uncontrolled (A1c 13.5%) with insulin resistance  Was briefly on insulin drip while inpatient.   Endocrinology following- recs per 2/19/23 note:    - decreased lantus to 45 units daily (may change back to Tuojeo on discharge)                 -Novolog  Decreased to uniformly 1 per 5 grams  And for snacks, admin instruction reads \"For use during waking hours.  Patient may have 0200 snack withOUT aspart coverage to limit low BG risk while sleeping.\"                 -Continue Novolog custom 2/30 sliding scale TID AC and HS  For BG >140 and 200 at bedtime                 -BG monitoring TID AC, HS, 0200                 -holding PTA Ozempic, due to NIRAJ. Once renal function stable we could resume (or substitute with Victoza while at rehab)                 (Jardiance was stopped due to UTIs)                 -hypoglycemia protocol                 -carb counting protocol                 -diabetes education needs will be assessed closer to discharge.   Recent Labs   Lab 02/20/23  0743 02/20/23  0215 02/19/23  2159 02/19/23  1742 02/19/23  1152 02/19/23  0738   * 106* 91 100* 137* 206*     Right posterior thigh pain  Patient continues to complain with mid-right thigh pain \"jelena horse\" that is constant.    Patient states that the pain will start in the thigh and radiate up to the back.  Back pain is not constant.  PT notes very likely is hamstrings.  No e/o cellulitis or abscess on exam. XR right thigh and hip: no e/o fracture and mild degenerative changes. US right LE no DVT and no clear e/o abscess or cellulitis  - topical icyhot  -Continue therapies    Right hand unilateral tremor  Patient states she started noticing a tremor in her right hand this morning.  She states it was so bad that she was spilling her breakfast.  She states that the tremor has decreased in severity throughout the day.  Discussed stress, anxiety, changes in medications.  -We will continue " to monitor     Acute on chronic Kidney Disease Stage 3b  Hyperkalemia, resolved  Longstanding history of diabetes and HTN. Baseline Creatinine appears to be around 1.2-1.4. Elevated until recently prior to discharge in the setting of aggressive diuresis. Creatinine on ARU admission 1.4. K up to 7.3 (2/9) w/o EKG changes. Nephrology consulted inpatient. Cr stable at ARU 1.2-1.5.   - trend BMP  - Hold spironolactone (high K)  - bumex 1mg daily and 1mg additional prn weight gain >5lb or >10lb in 1 week (extra dose 2/18 and 2/19)  - Avoid nephrotoxic agents  - EKG prn if hyperkalelmia recurs      Graves Disease s/p Radioiodine Ablation  Hypothyroidism 2/2 Radioiodine Ablation  TSH 20.4 and free T4 0.82 on admission. Endocrine followed during inpatient admission and updated levothyroxine dosing.   - Continue levothyroxine 200 mcg M-Sat- 400 mcg on Sunday.    - Recheck thyroid function tests in a month (March 2023)    Right Toe Wound, necrotic toes  Present on admission, right great toe and 3rd toe, dry gangrene, pt unaware how long wounds have been present. Vascular, podiatry and orthopedics consulted inpatient. ABIs with normal waveforms.  - WOCN: wound cares to right toe daily. Paint toes with betadine. Be sure to apply over dry eschar. Ok to leave open to air. No soaking feet.  - Ortho follow up to discuss elective amputation on 3/3 w/ Dr. Leahy  - Continue with LE elevation, able to ambulate w/ PT/OT     Urinary retention with indwelling phan  Acute simple cystitis, resolved  Gross hematuria  Hx E. Coli UTI  Positive urine cultures for E. Coli and completed macrobid 5 day course through 2/7. Urine is pink on ARU admission, trace. Patient failed trial of void inpatient. Urology was consulted inpatient and recommended continue indwelling phan.   -  Phan out 2/17, consider replacing if worsening NIRAJ  - if phan is present, ensure phan is changed at 2-4 weeks  - will need urology follow up for trial of  void     Chronic intermittent Asthma  Lungs clear on ARU admission  - Continue PTA inhalers (levalbuterol and spiriva)  - Magic mouthwash ordered       JYOTI - Continue BiPAP at night    Hyperlipidemia - Continue lipitor 40 mg daily    Bilateral Upper and lower extremity joint Pain  Patient reports pain is improved. Has been using narcotics (percocet) along with shae, topical nsaids, and muscle relaxants. Aggressive diuresis vs diabetic neuropathy. Pain management curbside consulted inpatient 1/31: decrease gabapentin to BID, stop flexeril, add Robaxin 400 q6hr PRN  - PRN Lidocaine patches  - continue inpatient pain regiment at ARU  - encourage increase mobility  - follow up with PCP on discharge     Dania Humphries, LEONEL, APRN  Internal Medicine [facility]  [facility]  Pager [phone]            Interval History:   Chief complaint of hand tremor.  States tremor started this morning when she woke up and had it is worse was during breakfast.  No history of this.  Only changes she has noticed an increase in anxiety and stress.  Patient states she feels short of breath when lying flat.  Is not using her BiPAP, instead is using supplemental oxygen at night.  Encouraged patient to prop head of the bed up.  Discussed additional dose of Bumex today.  Patient continues to complain of right hamstring muscle pain that radiates up to her back.  Encouraged her to stretch and continue exercises that PT is recommending.    Outstanding labs from today.   this morning had a difficult time drawing.  We will continue to watch for lab results.          Physical Exam:   Blood pressure 104/67, pulse 101, temperature 98  F (36.7  C), resp. rate 18, weight (!) 219.1 kg (483 lb 1.6 oz), SpO2 97 %, not currently breastfeeding.    GENERAL: Alert and oriented x 3. Well nourished, well developed.  No acute distress.    HEENT: Normocephalic, atraumatic. Anicteric sclera. Mucous membranes moist.   CV: RRR. S1, S2. No murmurs  appreciated.   RESPIRATORY: Effort normal on RA. Lungs CTAB with no wheezing, rales, or rhonchi.  Positive orthopnea  GI: Abdomen soft and non distended, bowel sounds present x all 4 quadrants. No tenderness, rebound, or guarding.   NEUROLOGICAL: No focal deficits. Follows commands.  Strength equal in upper and lower extremities.   MUSCULOSKELETAL: No joint swelling or tenderness. Moves all extremities.  Right hamstring tightness and pain  EXTREMITIES: No gross deformities.  Chronic peripheral edema  SKIN: Grossly warm, dry, and intact. No jaundice. No rashes.       ROUTINE IP LABS (Last four results)  CMP   Recent Labs   Lab 02/20/23  0743 02/20/23  0215 02/19/23  2159 02/19/23  1742 02/19/23  0738 02/19/23  0649 02/16/23  1656 02/16/23  1253 02/14/23  0807 02/14/23  0715   NA  --   --   --   --   --  137  --  134*  --  134*   POTASSIUM  --   --   --   --   --  4.7  --  4.6  --  4.4   CHLORIDE  --   --   --   --   --  100  --  96*  --  96*   CO2  --   --   --   --   --  28  --  30*  --  26   ANIONGAP  --   --   --   --   --  9  --  8  --  12   * 106* 91 100*   < > 192*   < > 159*   < > 223*   BUN  --   --   --   --   --  57.0*  --  51.9*  --  70.0*   CR  --   --   --   --   --  1.49*  --  1.27*  --  1.41*   JUSTIN  --   --   --   --   --  9.1  --  9.5  --  9.0   MAG  --   --   --   --   --  1.9  --  2.0  --  2.4*   PHOS  --   --   --   --   --   --   --  4.1  --   --    PROTTOTAL  --   --   --   --   --  7.6  --   --   --   --    ALBUMIN  --   --   --   --   --  2.9*  --   --   --   --    BILITOTAL  --   --   --   --   --  0.6  --   --   --   --    ALKPHOS  --   --   --   --   --  122*  --   --   --   --    AST  --   --   --   --   --  30  --   --   --   --    ALT  --   --   --   --   --  18  --   --   --   --     < > = values in this interval not displayed.     CBC   Recent Labs   Lab 02/19/23  1024 02/16/23  1253   WBC 6.4 7.1   RBC 4.45 4.69   HGB 13.0 13.8   HCT 43.5 44.8   MCV 98 96   MCH 29.2 29.4   MCHC  29.9* 30.8*   RDW 16.5* 16.4*    327     INR No lab results found in last 7 days.

## 2023-02-20 NOTE — PLAN OF CARE
Discharge Planner Post-Acute Rehab OT:     Discharge Plan: home with pca 10 hrs during the day and 4 at night    Precautions: falls, bariatric equipment required    Current Status:  ADLs:    Mobility: SBA asha FWW in-room distances.    Grooming: IND seated w/ set up.    Dressing:  UB- mod a. LB- dependent. Feet- socks sba with AE    Bathing: pt able towash 8/10 areas assist with feet and buttocks. dep liko to rollling shower chair    Toileting: Transfer SBA FWW <> asha commode overlay on toilet. Hygiene pt sba sitting with priyanka cares with toilet aide while sitting.  IADLs:tba  Vision/Cognition: Anxious. assess higher cog     Assessment: Despite pain and anxiety re: recent LLE issue, pt motivated to participate and responsive to calming breath techniques in anticipation of activity. Tolerated SBA FWW short in-room amb and toileting routine. Pt tearful afterwards reporting relief and pride in performance.    Other Barriers to Discharge (DME, Family Training, etc):  Will likely need:   -bariatric commode overlay for toilet during daytime and to use as BSC for nighttime PRN  -bariatric extended tub bench  -toilet aide   -wide sock aide    pt has in room sock aide dressing stick , reacher and toilet hygiene aide

## 2023-02-21 NOTE — PLAN OF CARE
FOCUS/GOAL  Medical management    ASSESSMENT, INTERVENTIONS AND CONTINUING PLAN FOR GOAL:  Patient at baseline. Received PRN oxycodone once this shift, she was able to work with therapy. Remain on fluid restriction, 300 fluid intake this shift. Voided once this shift not measured. No BM reported. LBM 2/17 was started on senna and Docusate as she declined Miralax and not willing to get a suppository. BGs within baseline, correction given per MAR. Will continue with POC..  Goal Outcome Evaluation:      Plan of Care Reviewed With: patient    Overall Patient Progress: no changeOverall Patient Progress: no change    Outcome Evaluation: Had rounds today patient expected date for discharge target date 3/3. Patient to stay a litlle longer than previously expected  to allow more time with therapy. She is not happy about it.

## 2023-02-21 NOTE — PROGRESS NOTES
[facility]   Acute Rehabilitation Unit  Daily progress note    INTERVAL HISTORY  Pricilla Brown was seen and examined at bedside this morning during team rounds.  No acute events reported overnight.  Patient appears more down this morning.  She reports that pain is actually improving.  She is discouraged that the team feels she may need longer than initially anticipated.  She would prefer that rather than set a specific discharge date, that we provide a list of tasks that need to be accomplished prior to safe discharge to home.  She feels she has been working hard with therapy and is struggling with being away from home.  Feels sleep has been poor due to frequent disruptions/cares.  This is day 4 with no BM, but states she this is not uncommon for her even prior to admission.  She denies abdominal pain, nausea.  She would prefer to avoid Miralax as that has caused loose stools in past, but would rather use senna and colace as she does intermittently at home.  She is agreeable to scheduling those as well.  Also reviewed UTI treatment and monitoring for urinary retention.  Denies other questions at this time.    Functionally, she has still been limited some by right hamstring area pain.  She has not been able to participate in ambulation recently as she did the first few days.  She has only stood briefly and was just able to step up one stair.  Does need ADL assist but had significant PCA services prior to admission.  For full functional updates, see team rounds note from today.    MEDICATIONS    apixaban ANTICOAGULANT  5 mg Oral BID     atorvastatin  40 mg Oral Daily     bumetanide  1 mg Oral Daily     ciprofloxacin  500 mg Oral Q12H [facility] (08/20)     diltiazem ER  120 mg Oral BID     gabapentin  300 mg Oral Daily     gabapentin  600 mg Oral BID     hydrALAZINE  10 mg Oral TID     insulin aspart  1-14 Units Subcutaneous TID AC     insulin aspart  1-11 Units Subcutaneous At Bedtime      insulin aspart   Subcutaneous Daily with breakfast     insulin aspart   Subcutaneous Daily with lunch     insulin aspart   Subcutaneous Daily with supper     insulin glargine  47 Units Subcutaneous QAM     levothyroxine  200 mcg Oral Once per day on Mon Tue Wed Thu Fri Sat     levothyroxine  400 mcg Oral Weekly     lidocaine  2 patch Transdermal Q24H     lidocaine   Transdermal Q8H [facility]     metoprolol succinate ER  50 mg Oral Daily     miconazole   Topical BID     nicotine  1 patch Transdermal Daily    And     nicotine   Transdermal Q8H OPHELIA     senna-docusate  1 tablet Oral At Bedtime     topiramate  25 mg Oral Daily     umeclidinium  1 puff Inhalation Daily        acetaminophen, albuterol, albuterol, sore throat, capsaicin, clotrimazole, glucose **OR** dextrose **OR** glucagon, diclofenac, docusate sodium, hydrALAZINE, hydrOXYzine **OR** hydrOXYzine, insulin aspart, methocarbamol, methyl salicylate-menthol, mineral oil-hydrophilic petrolatum, mineral oil-hydrophilic petrolatum, naloxone **OR** naloxone **OR** naloxone **OR** naloxone, nicotine, oxyCODONE, - MEDICATION INSTRUCTIONS -, polyethylene glycol, simethicone     PHYSICAL EXAM  /58   Pulse 60   Temp 98.3  F (36.8  C) (Axillary)   Resp 20   Wt (!) 221.9 kg (489 lb 3.2 oz)   SpO2 95%   BMI 76.62 kg/m     Gen: NAD, sitting up in chair, intermittently tearful  HEENT: NC/AT, MMM  Cardio: appears well-perfused  Pulm: non-labored on room air  Abd: obese, soft, non-tender  Ext: no pitting edema in bilat lower extremities  Neuro/MSK: awake, alert, PERRL, EOMI, face symmetric, moving all extremities in chair    LABS  Last Basic Metabolic Panel:  Recent Labs   Lab Test 02/21/23  0201 02/20/23  2106 02/20/23  1919 02/19/23  0738 02/19/23  0649 02/16/23  1656 02/16/23  1253   NA  --   --  137  --  137  --  134*   POTASSIUM  --   --  4.5  --  4.7  --  4.6   CHLORIDE  --   --  99  --  100  --  96*   CO2  --   --  27  --  28  --  30*   ANIONGAP  --   --  11   --  9  --  8   * 163* 211*   < > 192*   < > 159*   BUN  --   --  55.9*  --  57.0*  --  51.9*   CR  --   --  1.44*  --  1.49*  --  1.27*   GFRESTIMATED  --   --  44*  --  43*  --  52*   JUSTIN  --   --  9.2  --  9.1  --  9.5    < > = values in this interval not displayed.     CBC RESULTS:   Recent Labs   Lab Test 02/21/23  0643 02/20/23  1920 02/19/23  1024   WBC 8.1 8.1 6.4   RBC 4.20 4.27 4.45   HGB 12.1 12.3 13.0   HCT 41.3 41.4 43.5   MCV 98 97 98   MCH 28.8 28.8 29.2   MCHC 29.3* 29.7* 29.9*   RDW 16.4* 16.6* 16.5*    256 183       Rehabilitation - continue comprehensive acute inpatient rehabilitation program with multidisciplinary approach including therapies, rehab nursing, and physiatry following. See interval history for updates.      ASSESSMENT AND PLAN  Pricilla Brown is a 48 year old female with a past medical history of chronic HFrEF 2/2 NICM, persistent atrial fibrillation (on Eliquis), asthma, diabetes mellitus type II, hypertension, obesity, hypothyroidism 2/2 radioablation for Graves disease, JYOTI, CKD, and recent admission for HF exacerbation (12/25/22-1/1/23) who was admitted on 1/23/23 with heart failure exacerbation with hospital course complicated by encephalopathy, hypercarbia, NIRAJ, hyperkalemia, labile blood glucose, UTI, dry gangrene of toes on right foot, and pain.  She is now admitted to ARU on 2/14/23 for multidisciplinary rehabilitation and ongoing medical management.        Admission to acute inpatient rehab 02/14/23.    Impairment group code: 09 Cardiac -  heart failure exacerbation        1. PT and OT 90 minutes of each on a daily basis, in addition to rehab nursing and close management of physiatrist.       2. Impairment of ADL's: Noted to have impaired activity tolerance, impaired balance, impaired ROM, impaired sensation, impaired strength and pain, all affecting her ability to safely and independently perform basic ADLs.  Goal for mod I with basic ADLs with assist for  IADLs and bathing as PTA.     3. Impairment of mobility:  Noted to have impaired activity tolerance, impaired balance, impaired ROM, impaired sensation, impaired strength and pain, all affecting her ability to safely and independently perform basic mobility.  Goal for mod I with basic mobility.     4. Medical Conditions  New actions/orders/updates for today are in blue.     Acute on Chronic Systolic Heart Failure (EF 40-45%)  Long-Standing Persistent Atrial Fibrllation  Hypertension   Troponin Elevation 2/2 CHF  [PTA meds: apixaban 5 mg BID, atorvastatin 40 mg daily, Bumex 5 mg BID, hydralazine 12.5 mg TID, hydrochlorothiazide 12.5 mg BID, isosorbide dinitrate 20 mg TID, Toprol XL 75 mg daily, spironolactone 50 mg daily]  Pt admitted with c/o increasing SOB, MURRAY, chest tightness, weight gain in spite of compliance with PO diuretics. NT BNP 5891 (was 2030 during most recent admission).  Lactic acid 2.2 on admission with improvement to 1.1 with diuresis. Troponin mildly elevated, 67-->75-->60 likely 2/2 CHF exacerbation; no concern for ACS currently. Most recent Echo with EF 40-45%. Coronary angiogram 2012 with no significant CAD. Of note, pt recently admitted 12/25-1/1 HF exacerbation, diuresed to 472lbs. Pt weight on admission 1/23 555lbs and down to 450lbs on discharge to ARU.  Vitals:    02/16/23 0610 02/17/23 0645 02/18/23 0220 02/19/23 1100   Weight: (!) 205 kg (452 lb) (!) 214.6 kg (473 lb 3.2 oz) (!) 223.6 kg (492 lb 14.4 oz) (!) 219.1 kg (483 lb 1.6 oz)    02/20/23 1754   Weight: (!) 221.9 kg (489 lb 3.2 oz)   - Continue Bumex 1 mg daily, with additional prn dose for weight gain   - Per cardiology, suspect EDW is around 445-450 lbs.  Monitor daily weights.  Call [facility] clinic if weight increases 5 lbs in a day two days in a row or 10 lbs in 1 week.  Weight trends have been up and down, difficulty evaluating accuracy of bed weights, discussed with nursing.  S/p extra doses of Bumex on 2/18, 2/19, 2/20, and 2/21.   Discussed with IM as patient was on much higher doses of Bumex PTA (5 mg BID) yet still presented with volume overload.  However, feel she is diuresing well at this time.  If continues to have uptrending weight or getting extra Bumex doses, IM will discuss with cardiology.  - Continue I/O, low Na diet, 2L fluid restriction   - BB: Continue Toprol XL 25mg daily  - ACEi/ARB/ARNI: Discontinued Lisinopril d/t allergy, if BP elevated, can trial Losartan    - SGLT2i: Contraindicated with recurrent yeast infections   - Aldosterone Antagonist: Discontinued Aldactone due to NIRAJ, hyperkalemia  - Rate Control: BB as above. Given mildly reduced EF, ok to use Diltiazem 120 mg BID  - Anticoagulation: CHADSVASC 3; Continue PTA apixaban  - Afterload reduction: Continue hydralazine 10 mg TID (hold if SBP <110), can give additional PRN dose if SBP >180  - [facility] consulted; follow up after ARU discharge.  Plan to discuss CardioMEMS at this appointment.  Currently scheduled on 2/24.     Chronic Kidney Disease Stage 3b  NIRAJ, improving  Longstanding history of diabetes and HTN. Baseline Cr ~1.2-1.4. Cr on admission 1.56. During hospitalization, patient diuresed to 427 lbs with NIRAJ, Cr peak 2.1. After backing off on diuretics, Cr improved to 1.4 on 2/14 at admission to ARU  - Avoid nephrotoxic agents  - Resume diuretics as listed above  - Trend BMP every M/Th.  Cr uptrending to 1.49 on 2/19.  2/21: Cr stable at 1.45     Hyperkalemia, resolved  Potassium continually rising since 2/9 with peak 2/11 @ 7.3.  EKG without acute changes, rhythm stable in a fib.  Nephrology consulted.  Potassium shifted with insulin, calcium gluconate for cardiac stability, lokelma 15 g X 1, IV Bumex total of 6 mg on 2/11/23.  Spironolactone discontinued.  - Low K diet  - I/O  - Continue to hold spironolactone, per nephrology can consider resuming once Cr stable  - Diuresis as above  - Trend BMP every M/Th.  2/21: K stable/WNL at 4.7     Hyponatremia  In setting  "of aggressive diuresis.  Now mild, improved to 134 on 2/14.  - Trend BMP every M/Th.  2/21: Na stable/WNL at 137     Hypothyroidism 2/2 below  Graves Disease s/p Radioiodine Ablation  TSH 20.4 and free T4 0.82 on admission. Endocrine consulted.  - Continue levothyroxine 200 mcg M-Sat + 400 mcg on Sunday.    - Recheck TFT in a month     Type 2 Diabetes Mellitus, insulin-dependent, uncontrolled  Hyperglycemia  Diabetic neuropathy  A1c 13.5% on 12/25/2022. Discrepancies in patient's home regimen.  Per endo, PTA on \"Toujeo 225 units daily, novolog 100 units with each meal + CS, and ozempic 1 mg weekly. She could not tolerate empaglifozin due to frequent UTIs and metformin due to GI side effects.\" Endocrinology consulted during admission.  Insulin gtt started 2/1 per endocrinology, transitioned back to subcutaneous lantus and novolog on 2/3/23.   Recent Labs   Lab 02/21/23  0201 02/20/23  2106 02/20/23  1919 02/20/23  1716 02/20/23  1117 02/20/23  0743   * 163* 211* 172* 237* 193*   - Monitor BG TIC AC + HS + 0200  - Endocrinology continuing to follow, appreciate assistance.  Per their recs on 2/21:   - Increase Lantus to 47 units qAM  - Continue Novolog custom (2/30) sliding scale insulin TID AC and HS  - Continue Novolog 1:4g CHO with breakfast and lunch, 1:5g with dinner and snacks (except 0200 snack, which should be given without coverage to limit hypoglycemia risk during sleep)  - Unable to resume PTA Ozepmic at ARU admission (not on formulary; held due to recent NIRAJ).  Can consider substitution with liraglutide if indicated.  - Hypoglycemia protocol  - OP follow up Thelma Archibald/Dr Silva Damon, scheduled on 3/17     Necrotic wounds of Right Toes 1, 3, and 4, resolved  Present on admission, pt unaware how long wounds have been present.  Vascular consulted; JOSETTE's completed showing patent vessels and normal waveforms. They recommend orthopedic foot and ankle consult.  Ortho consulted, felt consistent " with dry gangrene, plan for wound care and follow-up as outpatient.   WOCN following as inpatient and provided wound care recs.  - WOCN consulted at ARU to continue to follow, appreciate assistance.  WOCN assessed toes on 2/15, debrided devitalized skin from tips of digits 1 and 4, intact skin beneath with no open wounds, no evidence of dry gangrene.  Did lose toenails on those digits.  - Wound cares updated per [facility] recs: Bilateral toes and feet: Wash daily with warm water, soap and a washcloth, taking particular attention to wash well between and under toes. Dry thoroughly with a clean towel. Moisturize skin on bilateral feet and legs with Sween 24. [place] missing toenail beds on great and 4th toes with betadine.  Also needs consistent foot cares.  - WBAT  - Follow up with podiatry as outpatient (rescheduled from 2/17 to 3/3 with Dr. Tosin haro still anticipate at ARU)     Urinary Retention  Phan catheter placed on admission for strict I/O while aggressively diuresing. Removed 2/11, pt with urinary retention and straight cath x 3, Phan replaced. Urology curbside, continue phan for now, can re-attempt voiding trial as OP.  Per patient request, with improving transfers to facilitate toileting needs, phan removed on 2/17 for trial of void.    - Is voiding, but only a few times per day with quite large volumes (800-1000 mL at times), concerned she may still be retaining, especially in setting of recent NIRAJ.  Treating UTI as below.  Need to resume regular bladder scanning if no void for 4 hours and for PVRs.  Implement timed toileting.  If ongoing retention once UTI treated, would need to consider replacing phan.  - If ongoing retention or phan replaced, follow up with OP Urology    E coli UTI  UA on 2/19 with large leuk, pyuria, bacteriuria.  Started on empiric ciprofloxacin.  - Continue cipro 500 mg BID, plan for 5-day   - UC with >100k colonies E coli, susceptible to cipro.       Asthma  Patient endorsing mouth  irritation w/ inhaler use. Per inpatient team, roof of mouth erythematous and appears excoriated but w/o evidence of yeast.   - Continue PTA inhalers (levalbuterol and spiriva)  - Magic mouthwash    Tobacco use disorder  - Continue nicotine patch 14 mcg, started during recent (December) admission  - PRN nicorette gum added 2/20 for breakthrough cravings     JYOTI  - Continue BiPAP at night  - Patient reports that she has not been using BiPAP due to issues with mask, instead using oxygen with sleep.  RT met with patient on 2/20, but patient declined all alternative mask choices and expressed preference to continue nasal cannula with sleep while here instead.    Hyperlipidemia   - Continue PTA Lipitor 40 mg daily     Acute low back pain with bilateral radiculopathy  Chronic Pain  [PTA meds: Tylenol PRN, diclofenac QID PRN, gabapentin 600 mg TID, oxycodone 5 mg 5x/day]  Of note, per PDMP #140 tabs of oxycodone 5 mg were filled on 2/16 while patient hospitalized.  Patient reporting diffuse joint pain while inpatient. Using narcotics along with gabapentin, topical nsaids, and muscle relaxants. Aggressive diuresis vs diabetic neuropathy.  Pain management curbside consulted 1/31 while inpatient; decrease gabapentin to BID, stopped flexeril and added robaxin.  Pain improved initially at ARU admission but significantly increased pain in bilateral low back radiating to bilateral buttocks and posterior legs to feet.  Decreased with ambulation, but increased with any movement in bed.  Lumbar/thoracic xrays with grossly maintained vertebral height, no evidence of fracture.  Right thigh and hip xray with no evidence of fracture, mild degenerative changes.  US RLE negative for DVT.    - Continue PRN Tylenol, voltaren, capsaicin, robaxin, hydroxyzine, Bengay, lidocaine patch  - Continue gabapentin 600-300-600 mg.  Consider resuming  mg TID as indicated  - Continue Topamax 25 mg daily  - Continue oxycodone 5 mg q6h PRN  - As of  "2/20: Pain improved.  Still in posterior right thigh, radiating to low back at times but not to lower part of leg and no longer on left side.  Continue stretching and modalities in addition to above pharmacologic interventions.     Morbid obesity  Estimated body mass index is 76.62 kg/m  as calculated from the following:    Height as of 1/23/23: 1.702 m (5' 7\").    Weight as of this encounter: 221.9 kg (489 lb 3.2 oz).  - Complicates rehab/cares    Possible COVID-19 exposure  Per ID, patient was previously on unit with high prevalence of COVID-19.  Tested negative on 2/15.  Infection prevention recommending repeat testing to confirm negative at 5-7 days.  Ordered on 2/16 by ID provider; patient declining despite education.  Afebrile, no signs/symptoms of COVID infection.  COVID PCR negative on 2/20.        5. Adjustment to disability:  Clinical psychology to eval and treat if indicated  6. FEN: mod cons CHO, 2g NA, 2g K, 2L fluid restriction  7. Bowel: continent, monitor, PRN bowel meds available  8. Bladder: phan removed on 2/17 for TOV as above  9. DVT Prophylaxis: apixaban  10. GI Prophylaxis: not indicated  11. Code: full  12. Disposition: goal for home  13. ELOS:  10-14 days  14. Follow up Appointments on Discharge: PCP in 1-2 weeks, cardiology ([facility], scheduled 2/24), endocrinology (Thelma Archibald/Dr Silva Damon on 3/17), urology, ortho/podiatry (3/3 with Dr. Leahy)        Patient seen and discussed with Dr. Helen Harris, PM&R Staff Physician    Lulu Guerrero PA-C  Physical Medicine & Rehabilitation       Physician Attestation     I saw and evaluated Pricilla Brown as part of a shared APRN/PA visit.     I personally reviewed the vital signs, medications and labs.    I personally performed the substantive portion of the medical decision making for this visit - please see the ADALBERTO's documentation for full details.    Key management decisions made by me and carried out under my direction:   Team " rounds held today; please see separate document in Plan of Care tab for full details. Briefly,  discussed her current functional deficits and active medical issues. Pain has been a barrier for participation in therapies. She got discouraged when we discussed LOS and anticipated discharge on 3/3. Planned to have a list of therapy goals for discharge to home. She is very high risk for readmission and medical complications.     Helen Harris MD  Date of Service (when I saw the patient): 02/21/23

## 2023-02-21 NOTE — PROGRESS NOTES
CLINICAL NUTRITION SERVICES - REASSESSMENT NOTE     Nutrition Prescription    RECOMMENDATIONS FOR MDs/PROVIDERS TO ORDER:  None today    Malnutrition Status:    Patient does not meet two of the established criteria necessary for diagnosing malnutrition    Recommendations already ordered by Registered Dietitian (RD):  - Discontinue SF Gelatein  - Ensure Max (chocolate) at 2 pm    Future/Additional Recommendations:  - Monitor PO intake, supplement acceptance, need to adjust snacks, and weight trends  - Monitor need for additional nutrition education     EVALUATION OF THE PROGRESS TOWARD GOALS   Diet: 2 g Potassium, 2 g Sodium, Moderate Consistent Carbohydrate and 2000 mL Fluid Restriction  - Room service appropriate with assist    Snacks/supplements:   Food Snacks BID: String cheese, crackers, fruit cup (peaches/pears or grapes) and SF Gelatein @ 2 pm, and turkey sandwich on wheat bread with lettuce, tomato and quinn & a fruit cup (peaches/pears/grapes) at 8 pm    Intake: % per flowsheets over past week  Per [employer], pt has been ordering 3 meals/day. 3-day average: 1909 kcal and 112 g protein. If pt consuming 100% of meals and snacks, this meets 99% of low-end estimated needs and 118% of low-end estimated protein needs.      NEW FINDINGS   Met with pt in room. She does not like the taste of the SF Gelatein. We discussed other high protein options. Pt willing to try Ensure Max (30 g protein, 150 kcal). She has been enjoying her other snacks.    Weight:   Wt Readings from Last 10 Encounters:   02/20/23 (!) 221.9 kg (489 lb 3.2 oz)   02/14/23 (!) 204.1 kg (450 lb)   01/01/23 (!) 214.4 kg (472 lb 9.6 oz)   10/31/22 (!) 201 kg (443 lb 3.2 oz)   10/26/22 (!) 208.6 kg (459 lb 14.4 oz)   06/21/22 (!) 191 kg (421 lb)   06/15/22 (!) 189.8 kg (418 lb 8 oz)   04/05/22 (!) 193.2 kg (426 lb)   11/23/21 (!) 193.2 kg (426 lb)   10/20/21 (!) 193.2 kg (426 lb)   No weight loss noted. However, difficult to assess given fluid  status.     Labs:   BUN: 54.8 (H)  Cr: 1.45 (H)  B-237 over 24 hours    Meds:  Bumex  Novolog  Lantus, 47 units every morning  Levothyroxine   Senna-docusate  Topiramate  Miralax, PRN  Simethicone, PRN    GI: last BM 2/20 x1 per I/Os    Skin: WOCN following for bilateral feet. See  note for details    MALNUTRITION  % Intake: No decreased intake noted  % Weight Loss: None noted, difficult to assess given fluid status  Subcutaneous Fat Loss: None observed  Muscle Loss: None observed, difficult to assess  Fluid Accumulation/Edema: Moderate  Malnutrition Diagnosis: Patient does not meet two of the established criteria necessary for diagnosing malnutrition    Previous Goals    Patient to consume % of nutritionally adequate meal trays TID, or the equivalent with supplements/snacks.  Evaluation: Met    Previous Nutrition Diagnosis  Predicted increased nutrient needs related to wounds vs other     Evaluation: No change    CURRENT NUTRITION DIAGNOSIS  Predicted increased nutrient needs related to wounds vs other       INTERVENTIONS  Implementation  Medical food supplement therapy - discontinue SF gelatein, ordered Ensure Max    Goals  Patient to consume % of nutritionally adequate meal trays TID, or the equivalent with supplements/snacks.    Monitoring/Evaluation  Progress toward goals will be monitored and evaluated per protocol.    Jacklyn Whitmore RD, MARLA  [facility] RD pager: [phone]  Weekend/Holiday RD pager: [phone]

## 2023-02-21 NOTE — PROGRESS NOTES
Respiratory Care Note:    Pt. Refused Bipap tonight. She does not like our masks. I offered her a few different choices. She declined all of them. She prefers to sleep with her nasal cannula as an alternative. Pt. Is alert.     RT Dept.

## 2023-02-21 NOTE — PROGRESS NOTES
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  She is now admitted to ARU on 2/14/23 for rehabilitation.     Assessment:   1)Type II Diabetes Mellitus, uncontrolled (A1c 13.5%), with insulin resistance.  2)  Ecoli UTI    Plan:    - IncreaseLantus 47 units subcutaneous in am    -Novolog  1 per 4 grams with breakfast and lunch.  Continue 1:5 with dinner and snacks.--->And for snacks, admin instruction reads \"For use during waking hours.  Patient may have 0200 snack withOUT aspart coverage to limit low BG risk while sleeping.\"     -Continue Novolog custom 2/30 sliding scale TID AC and HS  For BG >140 and 200 at bedtime   -BG monitoring TID AC, HS, 0200   -holding PTA Ozempic, due to NIRAJ. Once renal function stable we could resume (or substitute with Victoza while at rehab)   (Jardiance was stopped due to UTIs)   -hypoglycemia protocol   -carb counting protocol   -diabetes education needs will be assessed closer to discharge.     Outpatient follow up: with [facility] , Dr hogue on 3/17/23.    Plan discussed with patient    Interval History and Assessment: interval glucose trend reviewed:   Continues with more activity. Creat elevated at 1.45. Has an UTI.  Likely from diuresis. But BG elevated so increased Lantus 47 today    Per previous note:  She is ordering meals with room service-- this seems really  Helpful given her multiple restrictions.  " "    Per previous note:  Snacks at 2 pm and , 8PM added to help Pricilla resist getting outside foods brought in. HS sliding scale order updated with request to move HS BG check and sliding scale to 2300, if she has her snack (sandwich).        Current nutritional intake and type: Orders Placed This Encounter      Combination Diet 2 gm K Diet; Moderate Consistent Carb (60 g CHO per Meal) Diet; 2 gm NA Diet      PTA Diabetes Regimen:   Tuojeo 40 unit(s) once daily at HS  Aspart 10 units with each meal + additional correction scale insulin as follows based on pre-meal glucose      -140 - 160: + 1 unit     -161 - 180: + 2 units     -181 - 200: + 3 units, and so on (sensitivity of 20 mg/dL)     Ozempic 1 mg weekly - she reports she did NOT resume as instructed     BG monitoring frequency:  3-4 times per day  BG trends at home: \"all over\" but mostly between 85 - 300      Historical:   Tuojeo (glargine U300) 225 units once daily  Novolog 100  Units with meals, plus 1/15 correction scale  Trulicity 1mg weekly on Mondays  Jardiance 25 mg daily AM - stopped 2/2 UTI's   (has not tolerated Metformin in the past)    Discharge Planning: TBD, Estimated length of stay: 20 days            Diabetes History:   Type of Diabetes: Type 2 Diabetes Mellitus  Lab Results   Component Value Date    A1C 13.5 12/25/2022    A1C >15.0 07/23/2021    A1C >14.0 08/19/2020    A1C 8.4 10/11/2018    A1C 9.6 05/29/2018    A1C 9.0 04/07/2017    A1C 12.1 03/10/2016              Review of Systems:     The Review of Systems is negative other than noted in the Interval History.           Medications:     Current Facility-Administered Medications   Medication     acetaminophen (TYLENOL) tablet 650 mg     albuterol (PROVENTIL HFA/VENTOLIN HFA) inhaler     albuterol (PROVENTIL) neb solution 2.5 mg     apixaban ANTICOAGULANT (ELIQUIS) tablet 5 mg     atorvastatin (LIPITOR) tablet 40 mg     benzocaine-menthol (CHLORASEPTIC) 6-10 MG lozenge 1 lozenge     bumetanide " (BUMEX) tablet 1 mg     capsaicin (ZOSTRIX) 0.025 % cream 1 applicator     ciprofloxacin (CIPRO) tablet 500 mg     clotrimazole (LOTRIMIN) 1 % cream     glucose gel 15-30 g    Or     dextrose 50 % injection 25-50 mL    Or     glucagon injection 1 mg     diclofenac (VOLTAREN) 1 % topical gel 2 g     diltiazem ER (CARDIZEM SR) 12 hr capsule 120 mg     docusate sodium (COLACE) capsule 100 mg     gabapentin (NEURONTIN) capsule 300 mg     gabapentin (NEURONTIN) capsule 600 mg     hydrALAZINE (APRESOLINE) tablet 10 mg     hydrALAZINE (APRESOLINE) tablet 10 mg     hydrOXYzine (ATARAX) tablet 25 mg    Or     hydrOXYzine (ATARAX) tablet 50 mg     insulin aspart (NovoLOG) injection (RAPID ACTING)     insulin aspart (NovoLOG) injection (RAPID ACTING)     insulin aspart (NovoLOG) injection (RAPID ACTING)     insulin aspart (NovoLOG) injection (RAPID ACTING)     insulin aspart (NovoLOG) injection (RAPID ACTING)     insulin aspart (NovoLOG) injection (RAPID ACTING)     insulin glargine (LANTUS PEN) injection 47 Units     levothyroxine (SYNTHROID/LEVOTHROID) tablet 200 mcg     levothyroxine (SYNTHROID/LEVOTHROID) tablet 400 mcg     Lidocaine (LIDOCARE) 4 % Patch 2 patch     lidocaine patch in PLACE     methocarbamol (ROBAXIN) tablet 500 mg     methyl salicylate-menthol (ICY HOT) ointment     metoprolol succinate ER (TOPROL XL) 24 hr tablet 50 mg     miconazole (MICATIN) 2 % powder     mineral oil-hydrophilic petrolatum (AQUAPHOR)     mineral oil-hydrophilic petrolatum (AQUAPHOR)     naloxone (NARCAN) injection 0.2 mg    Or     naloxone (NARCAN) injection 0.4 mg    Or     naloxone (NARCAN) injection 0.2 mg    Or     naloxone (NARCAN) injection 0.4 mg     nicotine (NICODERM CQ) 14 MG/24HR 24 hr patch 1 patch    And     nicotine Patch in Place     nicotine (NICORETTE) gum 2 mg     oxyCODONE (ROXICODONE) tablet 5 mg     Patient is already receiving anticoagulation with heparin, enoxaparin (LOVENOX), warfarin (COUMADIN)  or other  anticoagulant medication     polyethylene glycol (MIRALAX) Packet 17 g     senna-docusate (SENOKOT-S/PERICOLACE) 8.6-50 MG per tablet 1 tablet     simethicone (MYLICON) chewable tablet 80 mg     topiramate (TOPAMAX) tablet 25 mg     umeclidinium (INCRUSE ELLIPTA) 62.5 MCG/ACT inhaler 1 puff     Facility-Administered Medications Ordered in Other Encounters   Medication     sodium chloride (PF) 0.9% PF flush 10 mL            Physical Exam:    /76 (BP Location: Right arm, Patient Position: Sitting, Cuff Size: Adult Regular)   Pulse 60   Temp 96.9  F (36.1  C) (Oral)   Resp 18   Wt (!) 221.9 kg (489 lb 3.2 oz)   SpO2 96%   BMI 76.62 kg/m    Spoke with Pricilla on the pone    Gen: Alert  HEENT: NC/AT hearing intact to conversational volume  Resp: Unlabored  Neuro: oriented x3, communicating clearly  Psych: calm open mood          Data:     Recent Labs   Lab 02/21/23  0643 02/21/23  0201 02/20/23 2106 02/20/23 1919 02/20/23  1716 02/20/23  1117   * 193* 163* 211* 172* 237*     ROUTINE IP LABS (Last four results)  BMP  Recent Labs   Lab 02/21/23  0643 02/21/23  0201 02/20/23 2106 02/20/23 1919 02/19/23  0738 02/19/23  0649 02/16/23  1656 02/16/23  1253     --   --  137  --  137  --  134*   POTASSIUM 4.7  --   --  4.5  --  4.7  --  4.6   CHLORIDE 102  --   --  99  --  100  --  96*   JUSTIN 9.2  --   --  9.2  --  9.1  --  9.5   CO2 27  --   --  27  --  28  --  30*   BUN 54.8*  --   --  55.9*  --  57.0*  --  51.9*   CR 1.45*  --   --  1.44*  --  1.49*  --  1.27*   * 193* 163* 211*   < > 192*   < > 159*    < > = values in this interval not displayed.     CBC  Recent Labs   Lab 02/21/23  0643 02/20/23  1920 02/19/23  1024 02/16/23  1253   WBC 8.1 8.1 6.4 7.1   RBC 4.20 4.27 4.45 4.69   HGB 12.1 12.3 13.0 13.8   HCT 41.3 41.4 43.5 44.8   MCV 98 97 98 96   MCH 28.8 28.8 29.2 29.4   MCHC 29.3* 29.7* 29.9* 30.8*   RDW 16.4* 16.6* 16.5* 16.4*    256 183 327     INRNo lab results found in last 7  days.  Lab Results   Component Value Date    AST 37 01/29/2023    AST 13 08/19/2020     Lab Results   Component Value Date    ALT 11 01/29/2023    ALT 30 08/19/2020     Lab Results   Component Value Date    BILICONJ 0.0 10/17/2013      Lab Results   Component Value Date    BILITOTAL 1.4 01/29/2023    BILITOTAL 0.5 08/19/2020     Lab Results   Component Value Date    ALBUMIN 3.1 02/11/2023    ALBUMIN 2.8 06/15/2022    ALBUMIN 2.9 08/19/2020     Lab Results   Component Value Date    PROTTOTAL 7.5 01/29/2023    PROTTOTAL 8.0 08/19/2020      Lab Results   Component Value Date    ALKPHOS 97 01/29/2023    ALKPHOS 73 08/19/2020       35 minutes spent on the date of the encounter doing chart review, history, documentation and further activities per the note        Contacting the Inpatient Diabetes Team   From 8AM-4PM: page inpatient diabetes provider that is following the patient, or utilize the job code paging system.   From 4PM-8AM: page the job code for endocrine fellow on call.       Please use the following job code to reach the Inpatient Diabetes team. Note that you must use an in house phone and that job codes cannot receive text pages.     Dial [phone] (or star-star-star [phone] on the new [employer] telephones), then [phone] to reach the endocrine-diabetes provider on call.  Jamilah Prater PA-C  Inpatient Diabetes Service  Pager   [phone]  [place]  2/21/2023

## 2023-02-21 NOTE — PLAN OF CARE
Goal Outcome Evaluation:    Plan of Care Reviewed With: patient    Overall Patient Progress: improving    Outcome Evaluation: Pt continues to advocate for medications as the need arises, to change positions and to use T-pump to manage with her pain. She also uses the call light appropriately as the needs arises.    Provider ordered random bladder scan if not voiding after 4hrs. RT came and check on her but she declined BIPAP. On-call updated on lab results, no new orders received.

## 2023-02-21 NOTE — PLAN OF CARE
Discharge Planner Post-Acute Rehab OT:     Discharge Plan: home with pca 10 hrs during the day and 4 at night    Precautions: falls, bariatric equipment required    Current Status:  ADLs:    Mobility: SBA asha FWW in-room distances.    Grooming: IND seated w/ set up.    Dressing:  UB- mod a. LB- dependent. Feet- socks sba with AE    Bathing: pt able towash 8/10 areas assist with feet and buttocks. dep liko to rollling shower chair    Toileting: Transfer SBA FWW <> asha commode overlay on toilet. Hygiene pt sba sitting with priyanka cares with toilet aide while sitting.  IADLs:tba  Vision/Cognition: Anxious. assess higher cog     Assessment:  time spent discussing and preparing for discharge demands.  Pt has caregiver assistance 10 hours days and 4 hours nights. Pt would like to be able to use her regular toilet during the day w/care giver assistance. Pt would likely benefit from commode overlay. For bathing, pt has regular shower chair and would likely benefit from extended shower bench. Showed pt these options and price ranges. Provided leg  for improved ease with BLE mngt. especially RLE. Pt overarching goal is to accomplish stairs for home, educated pt on OT benefits that will also indirectly help pt for stairs.    Other Barriers to Discharge (DME, Family Training, etc):  Will likely need:   -bariatric commode overlay for toilet during daytime and to use as BSC for nighttime PRN  -bariatric extended tub bench (is there any commode overlays that double as extended tub benches?)  -toilet aide   -wide sock aide    pt has in room sock aide dressing stick , reacher and toilet hygiene aide, leg

## 2023-02-21 NOTE — PLAN OF CARE
Acute Rehab Care Conference/Team Rounds      Type: Team Rounds    Present: Dr. Helen Harris, Lulu Guerrero PA, Dr. Agnes Velazquez Neuropsychologist, Blake Avelar PT, Dona Fields OT, Merline STRONGSW, Jacklyn Whitmore RD, Nicolette Alberto RN, and Pricilla Brown Patient.     Discharge Barriers/Treatment/Education    Rehab Diagnosis: acute on chronic CHF - exacerbation     Active Medical Co-morbidities/Prognosis: HTN, A fib, CKD, electrolyte imbalance, DM with neuropathy, wound, JYOTI, HLD, pain     Safety: A1 with FWW, alert and oriented, able to make needs known, calls appropriately    Pain: pain managed by scheduled and PRN medications    Medications, Skin, Tubes/Lines: takes medication whole, no lines/tubes, wound to bilateral feet/toes followed by [facility] nurse.     Swallowing/Nutrition: on regular diet     Bowel/Bladder: Continent of Bladder, utilized Purewick at night, LBM 2/17/2023    Psychosocial: Pt lives at home alone in an apartment. Pt states she has 14hrs/day of PCA services provided by her caregiver, Hira Mujica. 4 stairs to enter home. Railings safe and in good condition, railings on both sides of stairs. Tub shower with bench. Needs assistance with: Bathing, Dressing, Grooming, Ambulation-walker, Ambulation-cane, Cooking, Laundry, Shopping, Meal Preparation, Transportation, Cleaning. Chronic pain PTA. [facility] ([phone]). Denied mental health concerns. Tobacco use reported. Denied alcohol abuse. No financial concerns.     ADLs/IADLs: Pt making progress with ADL, need to progress standing tolerance and dressing. Pt using AE for toileting. Pt has PCA services for IADL but need to progress her mobility and ADL to mod I. Pt has DME needs as well.   ADLs:    Mobility: SBA asha FWW in-room distances.    Grooming: IND seated w/ set up.    Dressing:  UB- mod a. LB- dependent. Feet- socks sba with AE    Bathing: pt able towash 8/10 areas assist with feet and buttocks. dep liko to  rollling shower chair    Toileting: Transfer SBA FWW <> asha commode overlay on toilet. Hygiene pt sba sitting with priyanka cares with toilet aide while sitting.  IADLs:tba  Vision/Cognition: Anxious. assess higher cog       Mobility: Pt's progress has been slowed due to significant R calf pain leading to fluctuating performance. She needs to progress to perform 5 stairs to enter her home. Discharge to sister's home without stairs could be a backup. May need w/c if mobility not consistent.  Bed Mobility: SBA rolling L/R. Min A supine<>sit  Transfer: CGA/SBA with FWW  Gait: 25' FWW SBA, but not consistent to due to R calf pain, at times unable   Stairs: Unable - has been able to perform a step tap, but unable to ascend  Balance: needs heavy UE support in standing    Cognition/Language:    Community Re-Entry: Home bound based on current mobility/activity tolerance    Transportation: Will need to progress to perform car transfer with family training    Decision maker: self    Plan of Care and goals reviewed and updated.    Discharge Plan/Recommendations    Fall Precautions: continue      Overall plan for the patient: discussed her current functional deficits and active medical issues. Pain has been a barrier for participation in therapies. She got discouraged when we discussed LOS and anticipated discharge on 3/3. Planned to have a list of therapy goals for discharge to home. She is very high risk for readmission and medical complications.       Utilization Review and Continued Stay Justification    Medical Necessity Criteria:    For any criteria that is not met, please document reason and plan for discharge, transfer, or modification of plan of care to address.    Requires intensive rehabilitation program to treat functional deficits?: Yes    Requires 3x per week or greater involvement of rehabilitation physician to oversee rehabilitation program?: Yes    Requires rehabilitation nursing interventions?: Yes    Patient is  making functional progress?: Yes    There is a potential for additional functional progress? Yes    Patient is participating in therapy 3 hours per day a minimum of 5 days per week or 15 hours per week in 7 day period?:Yes    Has discharge needs that require coordinated discharge planning approach?:Yes          Final Physician Sign off    Statement of Approval: I agree with all the recommendations detailed in this document.      Patient Goals  Social Work Goals: Confirm discharge recommendations with therapy, coordinate safe discharge plan and remain available to support and assist as needed.     Therapy Frequency (OT): Daily  OT: Hygiene/Grooming: modified independent  OT: Upper Body Dressing: Modified independent  OT: Lower Body Dressing: Modified independent  OT: Upper Body Bathing: Supervision/stand-by assist  OT: Lower Body Bathing: Minimal assist  OT: Bed Mobility: Minimal assist (shower)     OT: Toilet Transfer/Toileting: Modified independent  OT: Meal Preparation: Supervision/stand-by assist  OT: Home Management: Supervision/stand-by assist  OT: Cognitive: Patient/caregiver will verbalize understanding of cognitive assessment results/recommendations as needed for safe discharge planning    PT Predicted Duration/Target Date for Goal Attainment: 02/28/23  PT Frequency: Other (see comments) ( minutes daily)  PT: Bed Mobility: Modified independent  PT: Transfers: Modified independent, Assistive device  PT: Gait: Assistive device, 25 feet, Rolling walker (for limited household mobility per PLOF)  PT: Stairs: Minimal assist, 4 stairs, Rail on both sides  PT: Perform aerobic activity with stable cardiovascular response: NuStep, intermittent activity, 15 minutes  PT: Goal 1: car transfer with MOD or < to access personal transportation  PT: Goal 2: pt to state 3 or > fall prevention strategies following education      Nursing goals   Patient Goal:  Pain Management: Pt will have appropriate pain management by  advocating for pain medications and using methods combined with ice, heat and repositioning to mange pain and actively participate in therapies  Goal: Skin Integrity: Pt will maintain adequate skin integrity and avoid skin breakdown by utilizing repositioning, learning appropriate hygiene cares, and engaging with nursing on treatment of existing skin wounds  Goal: Safety Management: Pt will remain free from halls by utilizing call light use, waiting for assistance for transfers, until Mod I prior to discharge from ARU

## 2023-02-21 NOTE — PLAN OF CARE
Discharge Planner Post-Acute Rehab PT:      Discharge Plan: Home (apt) 5 OCTAVIO with bilat rails, with resumed PCA services, home PT     Precautions: falls     Current Status:  Bed Mobility: SBA rolling L/R. Min A supine<>sit  Transfer: CGA/SBA with FWW  Gait: unable to tolerated due to R leg pain.   Stairs: NT  Balance: needs heavy UE support in standing     Assessment: pt cont to needing mod to max A for all bed mob, extra time and mulit attempts for STS. Pt needing heavy use of WW with pt only able to tolerated short amb and limited time in standing due to R leg pain. Pt crying in pain with quick decent into chairs -   Other Barriers to Discharge (DME, Family Training, etc):   Stairs to enter home- pt states she could stay at sisters w/o stairs if needed   Pt has 4WW

## 2023-02-21 NOTE — PROGRESS NOTES
ARU Progress Note          Assessment & Plan:   Pricilla Brown is a 48 year old female w/ PMH HFmrEF (EF 40-45%) 2/2 NICM, long-standing persistent AF, HTN, type II DM, morbid obesity, asthma, JYOTI and Graves disease s/p radioiodine ablation c/b hypothyroid  admitted on 1/23/2023 to [facility]. She is admitted to ARU on 2/14/23.        #Acute on Chronic Systolic Heart Failure (EF 40-45%)  #Long-Standing Persistent Atrial Fibrllation  #Hypertension   #Troponin Elevation 2/2 CHF, resolved   Pt admitted with c/o increasing SOB, MURRAY, chest tightness, weight gain in spite of compliance with PO diuretics. NT BNP 5891 (was 2030 during most recent admission).  Lactic acid 2.2 on admission with improvement to 1.1 with diuresis. Troponin mildly elevated, 67-->75-->60 likely 2/2 CHF exacerbation; no concern for ACS currently. Most recent Echo with EF 40-45%. Coronary angiogram 2012 with no significant CAD. Of note, pt recently admitted 12/25-1/1 HF exacerbation, diuresed to 472lbs. Pt weight on admission 1/23 555lbs. Noted weight gain.  Diuresing well when looking at intake and output.  - Volume Status: Euvolemic  - Diuresis: PO Bumex 1 mg daily and additional 1mg prn (5lb/24h or 10lb in a week).  Extra dose of Bumex 1 mg given 2/18, 2/19, 2/20, 2/21.  If continuing to need extra doses, will discuss with cardiology.  - Beta Blocker: Continue Toprol XL to 50mg  - ACEi/ARB/ARNI: contraindicated d/t lisinopril allergy.   - SGLT2i: Contraindicated with recurrent yeast infections   - Aldosterone Antagonist: Hold/discontinue PTA Spironolactone 50mg d/t hyperkalemia  - Rate Control: BB as above. Cont diltiazem 120 BID due to persistent tachycardia and borderline reduced EF.   - Afterload reduction: Cont hydralazine 10mg PO TID (hold parameters SBP <110), can give additional prn if sbp >180  - Anticoagulation: CHADSVASC 3; Continue PTA apixaban  - Strict I/O & Daily Weights  - Low Na Diet / 2L Fluid  "Restriction  - BMP M, Th   - C[facility] clinic to continue to follow   - Cardiology follow up 2/24     #Type 2 Diabetes Mellitus, uncontrolled (A1c 13.5%) with insulin resistance  Was briefly on insulin drip while inpatient.   Endocrinology following- recs per 2/20/23 note:    - decreased lantus to 45 units daily (may change back to Tuojeo on discharge)               -Novolog increased to uniformly 1 per 4 grams with breakfast and lunch.  Continue 1:5 with dinner and snacks.--->And for snacks, admin instruction reads \"For use during waking hours.  Patient may have 0200 snack withOUT aspart coverage to limit low BG risk while sleeping.\"                 -Continue Novolog custom 2/30 sliding scale TID AC and HS  For BG >140 and 200 at bedtime                 -BG monitoring TID AC, HS, 0200                 -holding PTA Ozempic, due to NIRAJ. Once renal function stable we could resume (or substitute with Victoza while at rehab)                 (Jardiance was stopped due to UTIs)                 -hypoglycemia protocol                 -carb counting protocol                 -diabetes education needs will be assessed closer to discharge.   -Outpatient follow-up with [facility] endocrinology, Dr. Ambrocio on 3/17/2023  Recent Labs   Lab 02/21/23  0753 02/21/23  0643 02/21/23  0201 02/20/23  2106 02/20/23  1919 02/20/23  1716   * 209* 193* 163* 211* 172*     Right posterior thigh pain  Patient continues to complain with mid-right thigh pain \"jelean horse\" that is constant.    Patient states that the pain will start in the thigh and radiate up to the back.  Back pain is not constant.  PT notes very likely is hamstrings.  No e/o cellulitis or abscess on exam. XR right thigh and hip: no e/o fracture and mild degenerative changes. US right LE no DVT and no clear e/o abscess or cellulitis  - topical icyhot  -Continue therapies     Right hand unilateral tremor-resolved  Patient states she started noticing a tremor in her right " hand this morning.  She states it was so bad that she was spilling her breakfast.  She states that the tremor has decreased in severity throughout the day.  Discussed stress, anxiety, changes in medications.  - continue to monitor     Acute on chronic Kidney Disease Stage 3b  Hyperkalemia, resolved  Longstanding history of diabetes and HTN. Baseline Creatinine appears to be around 1.2-1.4. Elevated until recently prior to discharge in the setting of aggressive diuresis. Creatinine on ARU admission 1.4. K up to 7.3 (2/9) w/o EKG changes. Nephrology consulted inpatient. Cr stable at ARU 1.2-1.5.   - BMP M, Th  - Hold spironolactone (high K)  - bumex 1mg daily and 1mg additional prn weight gain >5lb or >10lb in 1 week (extra dose 2/18 and 2/19)  - Avoid nephrotoxic agents    Right toe wound, necrotic toes  Present on admission, right great toe and third toe, dry gangrene, patient on or how long wounds have been present.  Vascular, podiatry, and orthopedics consulted while inpatient.  ABIs with normal waveforms.  -WOCN: Wound cares to right toe daily.  Betadine to be applied to toes and be sure to apply over dry eschar.  Okay to leave open to air.  No soaking feet  -Ortho follow-up to discuss elective amputation on 3/3 with Dr. Leahy  -Elevate lower extremities, weight-bear as tolerated    Graves Disease s/p Radioiodine Ablation  Hypothyroidism 2/2 Radioiodine Ablation  TSH 20.4 and free T4 0.82 on admission. Endocrine followed during inpatient admission and updated levothyroxine dosing.   - Continue levothyroxine 200 mcg M-Sat- 400 mcg on Sunday.    - Recheck thyroid function tests in a month (March 2023)    Stable and resolved:  Urinary retention, resolved-Keenan removed 2/17  Acute simple cystitis, resolved  JYOTI-continue BiPAP at night  Hyperlipidemia-continue Lipitor 40 mg daily  Asthma, mild intermittent-continue PTA inhalers  Chronic upper and lower extremity joint pain-as needed lidocaine patches, encourage  increased mobility        Dania Humphries, CNP, APRN  Internal Medicine [facility] Hospitalist  [facility]  Pager [phone]            Interval History:   Chief complaint of frustration.  Patient states she feels discouraged about having to stay extra days.  She is wanting people to give her more defined goals as far as how close she is to discharge and what she needs to do to be able to discharge.  We discussed extra doses of Bumex.  Discussed that she will get another dose of Bumex today and will likely continue to get an extra dose daily until we start to see weight stabilize her weight loss.  Patient states she is not adding salt to food, and is choosing lower salt.  Tremor in right hand has almost completely resolved, patient states intermittent shaking.  States she is continuing to work with therapy and it is going well.  She does realize she needs to be back to a baseline activity level in order to go home to live alone.  When asked about her pain, patient states is the same and has not gotten worse or improved.         Physical Exam:   Blood pressure 125/76, pulse 60, temperature 96.9  F (36.1  C), temperature source Oral, resp. rate 18, weight (!) 221.9 kg (489 lb 3.2 oz), SpO2 96 %, not currently breastfeeding.    GENERAL: Alert and oriented x 3. Well nourished, well developed.  No acute distress.    HEENT: Normocephalic, atraumatic. Anicteric sclera. Mucous membranes moist.   CV: RRR. S1, S2. No murmurs appreciated.   RESPIRATORY: Effort normal on RA. Lungs CTAB with no wheezing, rales, or rhonchi.   GI: Abdomen soft and non distended, bowel sounds present x all 4 quadrants. No tenderness, rebound, or guarding.   NEUROLOGICAL: No focal deficits. Follows commands.  Strength equal in upper and lower extremities.   MUSCULOSKELETAL: No joint swelling or tenderness. Moves all extremities.   EXTREMITIES: No gross deformities. No peripheral edema.   SKIN: Grossly warm, dry, and intact. No jaundice. No rashes.        ROUTINE IP LABS (Last four results)  CMP   Recent Labs   Lab 02/21/23  0753 02/21/23  0643 02/21/23  0201 02/20/23  2106 02/20/23  1919 02/19/23  0738 02/19/23  0649 02/16/23  1656 02/16/23  1253   NA  --  137  --   --  137  --  137  --  134*   POTASSIUM  --  4.7  --   --  4.5  --  4.7  --  4.6   CHLORIDE  --  102  --   --  99  --  100  --  96*   CO2  --  27  --   --  27  --  28  --  30*   ANIONGAP  --  8  --   --  11  --  9  --  8   * 209* 193* 163* 211*   < > 192*   < > 159*   BUN  --  54.8*  --   --  55.9*  --  57.0*  --  51.9*   CR  --  1.45*  --   --  1.44*  --  1.49*  --  1.27*   JUSTIN  --  9.2  --   --  9.2  --  9.1  --  9.5   MAG  --   --   --   --  1.7  --  1.9  --  2.0   PHOS  --   --   --   --  3.2  --   --   --  4.1   PROTTOTAL  --   --   --   --   --   --  7.6  --   --    ALBUMIN  --   --   --   --   --   --  2.9*  --   --    BILITOTAL  --   --   --   --   --   --  0.6  --   --    ALKPHOS  --   --   --   --   --   --  122*  --   --    AST  --   --   --   --   --   --  30  --   --    ALT  --   --   --   --   --   --  18  --   --     < > = values in this interval not displayed.     CBC   Recent Labs   Lab 02/21/23  0643 02/20/23 1920 02/19/23  1024 02/16/23  1253   WBC 8.1 8.1 6.4 7.1   RBC 4.20 4.27 4.45 4.69   HGB 12.1 12.3 13.0 13.8   HCT 41.3 41.4 43.5 44.8   MCV 98 97 98 96   MCH 28.8 28.8 29.2 29.4   MCHC 29.3* 29.7* 29.9* 30.8*   RDW 16.4* 16.6* 16.5* 16.4*    256 183 327     INR No lab results found in last 7 days.    OTHER:  NA

## 2023-02-22 NOTE — PLAN OF CARE
Goal Outcome Evaluation:      Plan of Care Reviewed With: patient    Overall Patient Progress: no changeOverall Patient Progress: no change       Patient here exacerbation of CHF, alert and oriented, able to make needs known  Slept most of the night, on O2 @ 2 L at night, refused BIPAP  Pain managed by scheduled and PRN medications, PRN Oxycodone given early this morning for back and leg pain  Turned pt to left side for comfort, pillows provided as well  Continent of Bladder, utilized bedpan at night, random bladder scan 81, no BM this shift  Safety rounding checked completed, 3 side rails UP, bed alarm ON, call light in reach  Continue with POC.

## 2023-02-22 NOTE — PROGRESS NOTES
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  She is now admitted to ARU on 2/14/23 for rehabilitation.     Assessment:   1)Type II Diabetes Mellitus, uncontrolled (A1c 13.5%), with insulin resistance.  2)  Ecoli UTI    Plan:    - IncreaseLantus 55 units subcutaneous in am    -Novolog  1 per 4 grams with breakfast and lunch.  Continue 1:5 with dinner and snacks.--->And for snacks, admin instruction reads \"For use during waking hours.  Patient may have 0200 snack withOUT aspart coverage to limit low BG risk while sleeping.\"     -Continue Novolog custom 2/30 sliding scale TID AC and HS  For BG >140 and 200 at bedtime   -BG monitoring TID AC, HS, 0200   -holding PTA Ozempic, due to NIRAJ. Once renal function stable we could resume (or substitute with Victoza while at rehab)   (Jardiance was stopped due to UTIs)   -hypoglycemia protocol   -carb counting protocol   -diabetes education needs will be assessed closer to discharge.     Outpatient follow up: with [facility] , Dr hogue on 3/17/23.    Plan discussed with rn and primary team by note    Interval History and Assessment: interval glucose trend reviewed:   Continues with more activity. Creat elevated at 1.45 yesterday, not checked yet today. Has an UTI.        Per previous note:  She is ordering meals with room service-- this seems really  Helpful given her multiple restrictions.      Per " "previous note:  Snacks at 2 pm and , 8PM added to help Pricilla resist getting outside foods brought in. HS sliding scale order updated with request to move HS BG check and sliding scale to 2300, if she has her snack (sandwich).        Current nutritional intake and type: Orders Placed This Encounter      Combination Diet 2 gm K Diet; Moderate Consistent Carb (60 g CHO per Meal) Diet; 2 gm NA Diet      PTA Diabetes Regimen:   Tuojeo 40 unit(s) once daily at HS  Aspart 10 units with each meal + additional correction scale insulin as follows based on pre-meal glucose      -140 - 160: + 1 unit     -161 - 180: + 2 units     -181 - 200: + 3 units, and so on (sensitivity of 20 mg/dL)     Ozempic 1 mg weekly - she reports she did NOT resume as instructed     BG monitoring frequency:  3-4 times per day  BG trends at home: \"all over\" but mostly between 85 - 300      Historical:   Tuojeo (glargine U300) 225 units once daily  Novolog 100  Units with meals, plus 1/15 correction scale  Trulicity 1mg weekly on Mondays  Jardiance 25 mg daily AM - stopped 2/2 UTI's   (has not tolerated Metformin in the past)    Discharge Planning: TBD, Estimated length of stay: 20 days            Diabetes History:   Type of Diabetes: Type 2 Diabetes Mellitus  Lab Results   Component Value Date    A1C 13.5 12/25/2022    A1C >15.0 07/23/2021    A1C >14.0 08/19/2020    A1C 8.4 10/11/2018    A1C 9.6 05/29/2018    A1C 9.0 04/07/2017    A1C 12.1 03/10/2016              Review of Systems:     The Review of Systems is negative other than noted in the Interval History.           Medications:     Current Facility-Administered Medications   Medication     acetaminophen (TYLENOL) tablet 650 mg     albuterol (PROVENTIL HFA/VENTOLIN HFA) inhaler     albuterol (PROVENTIL) neb solution 2.5 mg     apixaban ANTICOAGULANT (ELIQUIS) tablet 5 mg     atorvastatin (LIPITOR) tablet 40 mg     benzocaine-menthol (CHLORASEPTIC) 6-10 MG lozenge 1 lozenge     bisacodyl (DULCOLAX) " suppository 10 mg     bumetanide (BUMEX) tablet 1 mg     capsaicin (ZOSTRIX) 0.025 % cream 1 applicator     ciprofloxacin (CIPRO) tablet 500 mg     clotrimazole (LOTRIMIN) 1 % cream     glucose gel 15-30 g    Or     dextrose 50 % injection 25-50 mL    Or     glucagon injection 1 mg     diclofenac (VOLTAREN) 1 % topical gel 2 g     diltiazem ER (CARDIZEM SR) 12 hr capsule 120 mg     docusate sodium (COLACE) capsule 100 mg     docusate sodium (COLACE) capsule 50 mg     enema compound (docusate/mineral oil/NaPhos) NO MAG CITRATE MIXTURE     gabapentin (NEURONTIN) capsule 600 mg     hydrALAZINE (APRESOLINE) tablet 10 mg     hydrALAZINE (APRESOLINE) tablet 10 mg     hydrOXYzine (ATARAX) tablet 25 mg    Or     hydrOXYzine (ATARAX) tablet 50 mg     insulin aspart (NovoLOG) injection (RAPID ACTING)     insulin aspart (NovoLOG) injection (RAPID ACTING)     insulin aspart (NovoLOG) injection (RAPID ACTING)     insulin aspart (NovoLOG) injection (RAPID ACTING)     insulin aspart (NovoLOG) injection (RAPID ACTING)     insulin aspart (NovoLOG) injection (RAPID ACTING)     insulin glargine (LANTUS PEN) injection 55 Units     levothyroxine (SYNTHROID/LEVOTHROID) tablet 200 mcg     levothyroxine (SYNTHROID/LEVOTHROID) tablet 400 mcg     Lidocaine (LIDOCARE) 4 % Patch 2 patch     lidocaine patch in PLACE     menthol (Topical Analgesic) 2.5% (BENGAY VANISHING SCENT) 2.5 % topical gel     methocarbamol (ROBAXIN) tablet 500 mg     methyl salicylate-menthol (ICY HOT) ointment     metoprolol succinate ER (TOPROL XL) 24 hr tablet 50 mg     miconazole (MICATIN) 2 % powder     mineral oil-hydrophilic petrolatum (AQUAPHOR)     mineral oil-hydrophilic petrolatum (AQUAPHOR)     naloxone (NARCAN) injection 0.2 mg    Or     naloxone (NARCAN) injection 0.4 mg    Or     naloxone (NARCAN) injection 0.2 mg    Or     naloxone (NARCAN) injection 0.4 mg     nicotine (NICODERM CQ) 14 MG/24HR 24 hr patch 1 patch    And     nicotine Patch in Place      nicotine (NICORETTE) gum 2 mg     oxyCODONE (ROXICODONE) tablet 5 mg     Patient is already receiving anticoagulation with heparin, enoxaparin (LOVENOX), warfarin (COUMADIN)  or other anticoagulant medication     polyethylene glycol (MIRALAX) Packet 17 g     sennosides (SENOKOT) tablet 8.6 mg     simethicone (MYLICON) chewable tablet 80 mg     topiramate (TOPAMAX) tablet 25 mg     umeclidinium (INCRUSE ELLIPTA) 62.5 MCG/ACT inhaler 1 puff     Facility-Administered Medications Ordered in Other Encounters   Medication     sodium chloride (PF) 0.9% PF flush 10 mL            Physical Exam:    BP (P) 121/82 (BP Location: Right arm, Patient Position: Supine, Cuff Size: Adult Regular)   Pulse (P) 102   Temp (P) 98.2  F (36.8  C) (Oral)   Resp (P) 16   Wt (!) 223.8 kg (493 lb 6.4 oz)   SpO2 (P) 98%   BMI 77.28 kg/m    Unable to get Pricilla by phone today so just chart check            Data:     Recent Labs   Lab 02/22/23  0759 02/22/23  0249 02/21/23 2128 02/21/23  1653 02/21/23  1132 02/21/23  0753   * 237* 220* 169* 227* 205*     ROUTINE IP LABS (Last four results)  BMP  Recent Labs   Lab 02/22/23  0759 02/22/23  0249 02/21/23 2128 02/21/23  1653 02/21/23  0753 02/21/23  0643 02/20/23 2106 02/20/23  1919 02/19/23  0738 02/19/23  0649 02/16/23  1656 02/16/23  1253   NA  --   --   --   --   --  137  --  137  --  137  --  134*   POTASSIUM  --   --   --   --   --  4.7  --  4.5  --  4.7  --  4.6   CHLORIDE  --   --   --   --   --  102  --  99  --  100  --  96*   JUSTIN  --   --   --   --   --  9.2  --  9.2  --  9.1  --  9.5   CO2  --   --   --   --   --  27  --  27  --  28  --  30*   BUN  --   --   --   --   --  54.8*  --  55.9*  --  57.0*  --  51.9*   CR  --   --   --   --   --  1.45*  --  1.44*  --  1.49*  --  1.27*   * 237* 220* 169*   < > 209*   < > 211*   < > 192*   < > 159*    < > = values in this interval not displayed.     CBC  Recent Labs   Lab 02/21/23  0643 02/20/23  1920 02/19/23  1024  02/16/23  1253   WBC 8.1 8.1 6.4 7.1   RBC 4.20 4.27 4.45 4.69   HGB 12.1 12.3 13.0 13.8   HCT 41.3 41.4 43.5 44.8   MCV 98 97 98 96   MCH 28.8 28.8 29.2 29.4   MCHC 29.3* 29.7* 29.9* 30.8*   RDW 16.4* 16.6* 16.5* 16.4*    256 183 327     INRNo lab results found in last 7 days.  Lab Results   Component Value Date    AST 37 01/29/2023    AST 13 08/19/2020     Lab Results   Component Value Date    ALT 11 01/29/2023    ALT 30 08/19/2020     Lab Results   Component Value Date    BILICONJ 0.0 10/17/2013      Lab Results   Component Value Date    BILITOTAL 1.4 01/29/2023    BILITOTAL 0.5 08/19/2020     Lab Results   Component Value Date    ALBUMIN 3.1 02/11/2023    ALBUMIN 2.8 06/15/2022    ALBUMIN 2.9 08/19/2020     Lab Results   Component Value Date    PROTTOTAL 7.5 01/29/2023    PROTTOTAL 8.0 08/19/2020      Lab Results   Component Value Date    ALKPHOS 97 01/29/2023    ALKPHOS 73 08/19/2020       35 minutes spent on the date of the encounter doing chart review, history, documentation and further activities per the note        Contacting the Inpatient Diabetes Team   From 8AM-4PM: page inpatient diabetes provider that is following the patient, or utilize the job code paging system.   From 4PM-8AM: page the job code for endocrine fellow on call.       Please use the following job code to reach the Inpatient Diabetes team. Note that you must use an in house phone and that job codes cannot receive text pages.     Dial [phone] (or star-star-star [phone] on the new [employer] telephones), then [phone] to reach the endocrine-diabetes provider on call.  Jamilah Prater PA-C  Inpatient Diabetes Service  Pager   [phone]  [place]  2/21/2023

## 2023-02-22 NOTE — PLAN OF CARE
Discharge Planner Post-Acute Rehab PT:      Discharge Plan: Home (apt) 5 OCTAVIO with bilat rails, with resumed PCA services, home PT     Precautions: falls     Current Status:  Bed Mobility: SBA rolling L/R. Min A supine<>sit  Transfer: CGA/SBA with FWW  Gait: limited gait due to Rt hamstring pain resulting from chronic muscle spasm.   Stairs: difficulty lifting right leg for step up.  Balance: able with bilat UE support on walker     Assessment:   Pt cont to need rails and min A for bed mobility.   Heavy use of WW and tolerates short distance amb / limited standing due to R hamstring pain.   Right leg pain is focal to lateral hamstring approx 4-6 inches superior to posterior knee.  Tolerates mild pressure STM. Low threshold for muscle spasm in thigh muscles but lateral hamstring appears to be the most prevalent. Physiology of muscle spasm and limited duration of a single cramp explained to patient. Confered with MD: request of TENS and STM added to interventions.    Other Barriers to Discharge (DME, Family Training, etc):   Stairs to enter home- pt [facility] if needed   Pt has 4WW

## 2023-02-22 NOTE — PROGRESS NOTES
Pt refusing the use of the hospital provided V60 BIPAP unit stating the mask feels too small. Currently the unit is equipt with the largest mask the hospital carries. RT recommended for the pt to have their home unit brought for them to use.

## 2023-02-22 NOTE — PLAN OF CARE
Discharge Planner Post-Acute Rehab OT:     Discharge Plan: home with pca 10 hrs during the day and 4 at night    Precautions: falls, bariatric equipment required    Current Status:  ADLs:    Mobility: SBA asha FWW in-room distances.    Grooming: IND seated w/ set up.    Dressing:  UB:ot pt able todon pull over shirt after clothes retrieve for her. LB- with extra time while sitting on commode over toilet with use of reacher able to get both legs into shorts and up legs max a to adjust over hips.standing at fww.     Feet- socks pt able to don after set up of sock aide no instruction needed    Bathing: pt able towash 8/10 areas assist with feet and buttocks. dep liko to rollling shower chair    Toileting: Transfer SBA FWW <> asha commode overlay on toilet. Hygiene pt sba sitting with priyanka cares with toilet aide while sitting.  IADLs:tba  Vision/Cognition: Anxious. assess higher cog     Assessment:increase u/b and l/b dressing.this date    Other Barriers to Discharge (DME, Family Training, etc):  Will likely need:   -bariatric commode overlay for toilet during daytime and to use as BSC for nighttime PRN  -bariatric extended tub bench-toilet aide   -wide sock aide    pt has in room sock aide dressing stick , reacher and toilet hygiene aide, leg

## 2023-02-22 NOTE — PROGRESS NOTES
SPIRITUAL HEALTH SERVICES Progress Note  [facility] [place] [place]    Brief follow-up  visit with pt, who was about to have a therapy session, and also was a bit tired. I brought pt a printed scripture/prayers/hymns resource that she had asked for. I will continue to follow while pt on unit.     Nathan Rivera) Aruna Naranjo M.Div., [facility]  Staff   Pager [phone]      * [facility] remains available 24/7 for emergent requests/referrals, either by having the switchboard page the on-call  or by entering an ASAP/STAT consult in Epic (this will also page the on-call ). Routine Epic consults receive an initial response within 24 hours.*

## 2023-02-22 NOTE — PROGRESS NOTES
Brief medicine note:    #Acute on Chronic Systolic Heart Failure (EF 40-45%)  Patient consistently needing extra dose of Bumex daily for weight gain.  -Increase Bumex to 1 mg twice daily from daily  -BMP in a.m.  -recommend Cardiology consult    #Lethargy  Patient noted to be more lethargic and falling asleep easily.  Met with patient, and initially she was drowsy but was able to stay awake for entire conversation.  As we spoke more and readjusted patient in bed she was able to stay awake and became much more alert.  Patient states she is having pain in the right leg and has only had the oxycodone this morning.  Hemodynamically stable, oxygen saturations in the high 90s, normal respirations.  Do not think at this point she needs Narcan.  She has not been wearing her BiPAP at night which is likely contributing to how lethargic she is today.  -Continue to encourage patient to have family bring in home BiPAP  -RT to draw ABGs  -CMP to assess electrolyte, kidney, liver function, CO2  -Use BiPAP with naps and at night while sleeping    Medicine will continue to follow along.  Recommendations relayed to primary team via this progress note.  Thank you for the opportunity to be involved in this patient's care.    YOCASTA Herrera CNP  Internal Medicine [facility] Hospitalist  Page job code [phone] (3B), [phone] (3A), or [phone] ([facility] and 4A)  Text paging via [employer] is appreciated  February 22, 2023

## 2023-02-22 NOTE — PLAN OF CARE
Goal Outcome Evaluation:    Pt is A&OX4, calm, & cooperative with care. Denied CP, SOB, & n/v. VSS & on RA. A of 1 with walker & GB. Continent for both B&B; uses bathroom for BM. Pt used bedpan for bladder voided X2. Takes med whole with thin liquid. Managed pain with PRN oxycodone & tylenol X1. RT administered albuterol neb treatment per pt's request. Pt refused to use BIPAP; c/o mask not fitting right. On 2L O2 via NC. Removed BLE lymphedema wrap per pt's request @ HS. Pt ate dinner with good appetite. On carb coverage & sliding scale insulin; administered per order. Able to make needs known & call light within reach. Will continue with POC.

## 2023-02-22 NOTE — PLAN OF CARE
FOCUS/GOAL  Medical management    ASSESSMENT, INTERVENTIONS AND CONTINUING PLAN FOR GOAL:  Patient was lethargic this shift. Stayed in bed after morning therapy. Vitals stable. Little episode of apnea noted. Provider informed. Blood work and lab ordered. RT reached about CIPAP, patient need bigger mask. Received PRN pain medication once this shift. Declined Lidocaine patch, Bengay applied ( R calf). BG at patient's baseline. Coverage given.  Will continue with POC.  Goal Outcome Evaluation:      Plan of Care Reviewed With: patient    Overall Patient Progress: no changeOverall Patient Progress: no change    Outcome Evaluation: Chronic pain interfering with cares. PRN pain med given with some effect.

## 2023-02-22 NOTE — PROGRESS NOTES
[facility]   Acute Rehabilitation Unit  Daily progress note    INTERVAL HISTORY  Pricilla Brown was seen and examined at bedside this morning.  Her sleep was interrupted again with pain and cramps at posterior right thigh area. Pain is severe and intermittent; no radiation proximally or distally. This is a new pain since last week. No associated weakness or paresthesia.     No BM yesterday; discussed trial of supp after dinner today or enema and she was agreeable. Talked to the nursing team.    She requires CGA for transfers; walking has been limited by RLE pain. Talked to PT and reviewed the plan as outlined below.     MEDICATIONS    apixaban ANTICOAGULANT  5 mg Oral BID     atorvastatin  40 mg Oral Daily     bumetanide  1 mg Oral BID     ciprofloxacin  500 mg Oral Q12H [facility] (08/20)     diltiazem ER  120 mg Oral BID     docusate sodium  50 mg Oral BID     gabapentin  600 mg Oral TID     hydrALAZINE  10 mg Oral TID     insulin aspart  1-14 Units Subcutaneous TID AC     insulin aspart  1-11 Units Subcutaneous At Bedtime     insulin aspart   Subcutaneous Daily with breakfast     insulin aspart   Subcutaneous Daily with lunch     insulin aspart   Subcutaneous Daily with supper     insulin glargine  55 Units Subcutaneous QAM     levothyroxine  200 mcg Oral Once per day on Mon Tue Wed Thu Fri Sat     levothyroxine  400 mcg Oral Weekly     lidocaine  2 patch Transdermal Q24H     lidocaine   Transdermal Q8H [facility]     menthol (Topical Analgesic) 2.5%   Topical 4x Daily     metoprolol succinate ER  50 mg Oral Daily     miconazole   Topical BID     nicotine  1 patch Transdermal Daily    And     nicotine   Transdermal Q8H [facility]     sennosides  8.6 mg Oral BID     topiramate  25 mg Oral Daily     umeclidinium  1 puff Inhalation Daily        acetaminophen, albuterol, albuterol, sore throat, capsaicin, clotrimazole, glucose **OR** dextrose **OR** glucagon, diclofenac, docusate sodium, enema  compound (docusate/mineral oil/NaPhos) NO MAG CITRATE MIXTURE, hydrALAZINE, hydrOXYzine **OR** hydrOXYzine, insulin aspart, methocarbamol, methyl salicylate-menthol, mineral oil-hydrophilic petrolatum, mineral oil-hydrophilic petrolatum, naloxone **OR** naloxone **OR** naloxone **OR** naloxone, nicotine, oxyCODONE, - MEDICATION INSTRUCTIONS -, polyethylene glycol, simethicone     PHYSICAL EXAM  /62   Pulse 102   Temp 99.3  F (37.4  C)   Resp 16   Wt (!) 223.8 kg (493 lb 6.4 oz)   SpO2 98%   BMI 77.28 kg/m       Gen: NAD, resting in bed   Cardio: appears well-perfused  Pulm: non-labored on room air  Abd: obese, soft, non-tender  Ext: no pitting edema in bilat lower extremities  Neuro/MSK: alert and oriented. Had tenderness to palpation at right hamstring, especially medially. Also noted some mass/muscle knot at that area.     LABS  Last Basic Metabolic Panel:  Recent Labs   Lab Test 02/22/23  1742 02/22/23  1631 02/22/23  1142 02/21/23  0753 02/21/23  0643 02/20/23  2106 02/20/23  1919   NA  --  135*  --   --  137  --  137   POTASSIUM  --  4.3  --   --  4.7  --  4.5   CHLORIDE  --  99  --   --  102  --  99   CO2  --  24  --   --  27  --  27   ANIONGAP  --  12  --   --  8  --  11   * 126* 228*   < > 209*   < > 211*   BUN  --  52.4*  --   --  54.8*  --  55.9*   CR  --  1.28*  --   --  1.45*  --  1.44*   GFRESTIMATED  --  51*  --   --  44*  --  44*   JUSTIN  --  9.6  --   --  9.2  --  9.2    < > = values in this interval not displayed.     CBC RESULTS:   Recent Labs   Lab Test 02/21/23  0643 02/20/23  1920 02/19/23  1024   WBC 8.1 8.1 6.4   RBC 4.20 4.27 4.45   HGB 12.1 12.3 13.0   HCT 41.3 41.4 43.5   MCV 98 97 98   MCH 28.8 28.8 29.2   MCHC 29.3* 29.7* 29.9*   RDW 16.4* 16.6* 16.5*    256 183       ASSESSMENT AND PLAN  Pricilla Brown is a 48 year old female with a past medical history of chronic HFrEF 2/2 NICM, persistent atrial fibrillation (on Eliquis), asthma, diabetes mellitus type II,  hypertension, obesity, hypothyroidism 2/2 radioablation for Graves disease, JYOTI, CKD, and recent admission for HF exacerbation (12/25/22-1/1/23) who was admitted on 1/23/23 with heart failure exacerbation with hospital course complicated by encephalopathy, hypercarbia, NIRAJ, hyperkalemia, labile blood glucose, UTI, dry gangrene of toes on right foot, and pain.  She is now admitted to ARU on 2/14/23 for multidisciplinary rehabilitation and ongoing medical management.        Admission to acute inpatient rehab 02/14/23.    Impairment group code: 09 Cardiac -  heart failure exacerbation       Rehabilitation - continue comprehensive acute inpatient rehabilitation program with multidisciplinary approach including therapies, rehab nursing, and physiatry following. See interval history for updates.        Medical Conditions  New actions/orders/updates for today are in blue.     Acute on Chronic Systolic Heart Failure (EF 40-45%)  Long-Standing Persistent Atrial Fibrllation  Hypertension   Troponin Elevation 2/2 CHF  [PTA meds: apixaban 5 mg BID, atorvastatin 40 mg daily, Bumex 5 mg BID, hydralazine 12.5 mg TID, hydrochlorothiazide 12.5 mg BID, isosorbide dinitrate 20 mg TID, Toprol XL 75 mg daily, spironolactone 50 mg daily]  Pt admitted with c/o increasing SOB, MURRAY, chest tightness, weight gain in spite of compliance with PO diuretics. NT BNP 5891 (was 2030 during most recent admission).  Lactic acid 2.2 on admission with improvement to 1.1 with diuresis. Troponin mildly elevated, 67-->75-->60 likely 2/2 CHF exacerbation; no concern for ACS currently. Most recent Echo with EF 40-45%. Coronary angiogram 2012 with no significant CAD. Of note, pt recently admitted 12/25-1/1 HF exacerbation, diuresed to 472lbs. Pt weight on admission 1/23 555lbs and down to 450lbs on discharge to ARU.  Vitals:    02/17/23 0645 02/18/23 0220 02/19/23 1100 02/20/23 1754   Weight: (!) 214.6 kg (473 lb 3.2 oz) (!) 223.6 kg (492 lb 14.4 oz) (!)  219.1 kg (483 lb 1.6 oz) (!) 221.9 kg (489 lb 3.2 oz)    02/22/23 0700   Weight: (!) 223.8 kg (493 lb 6.4 oz)   - Continue Bumex 1 mg daily, with additional prn dose for weight gain   - Per cardiology, suspect EDW is around 445-450 lbs.  Monitor daily weights.  Call [facility] clinic if weight increases 5 lbs in a day two days in a row or 10 lbs in 1 week.  Weight remains overall trending up.  S/p extra doses of Bumex on 2/18, 2/19, 2/20, and 2/21.  Discussed with IM as patient was on much higher doses of Bumex PTA (5 mg BID) yet still presented with volume overload.  However, feel she is diuresing well at this time. 02/22/23 dose was increased to 1mg bid; consider cardiology consult.   - Continue I/O, low Na diet, 2L fluid restriction   - BB: Continue Toprol XL 25mg daily  - ACEi/ARB/ARNI: Discontinued Lisinopril d/t allergy, if BP elevated, can trial Losartan    - SGLT2i: Contraindicated with recurrent yeast infections   - Aldosterone Antagonist: Discontinued Aldactone due to NIRAJ, hyperkalemia  - Rate Control: BB as above. Given mildly reduced EF, ok to use Diltiazem 120 mg BID  - Anticoagulation: CHADSVASC 3; Continue PTA apixaban  - Afterload reduction: Continue hydralazine 10 mg TID (hold if SBP <110), can give additional PRN dose if SBP >180  - [facility] consulted; follow up after ARU discharge.  Plan to discuss CardioMEMS at this appointment.  Currently scheduled on 2/24.     Chronic Kidney Disease Stage 3b  NIRAJ, improving  Longstanding history of diabetes and HTN. Baseline Cr ~1.2-1.4. Cr on admission 1.56. During hospitalization, patient diuresed to 427 lbs with NIRAJ, Cr peak 2.1. After backing off on diuretics, Cr improved to 1.4 on 2/14 at admission to ARU  - Avoid nephrotoxic agents  - Resume diuretics as listed above  - Trend BMP every M/Th.  Cr uptrending to 1.49 on 2/19.  2/21: Cr stable at 1.45     Hyperkalemia, resolved  Potassium continually rising since 2/9 with peak 2/11 @ 7.3.  EKG without acute changes,  "rhythm stable in a fib.  Nephrology consulted.  Potassium shifted with insulin, calcium gluconate for cardiac stability, lokelma 15 g X 1, IV Bumex total of 6 mg on 2/11/23.  Spironolactone discontinued.  - Low K diet  - I/O  - Continue to hold spironolactone, per nephrology can consider resuming once Cr stable  - Diuresis as above  - Trend BMP every M/Th.  2/21: K stable/WNL at 4.7     Hyponatremia  In setting of aggressive diuresis.  Now mild, improved to 134 on 2/14.  - Trend BMP every M/Th.  2/21: Na stable/WNL at 137     Hypothyroidism 2/2 below  Graves Disease s/p Radioiodine Ablation  TSH 20.4 and free T4 0.82 on admission. Endocrine consulted.  - Continue levothyroxine 200 mcg M-Sat + 400 mcg on Sunday.    - Recheck TFT in a month     Type 2 Diabetes Mellitus, insulin-dependent, uncontrolled  Hyperglycemia  Diabetic neuropathy  A1c 13.5% on 12/25/2022. Discrepancies in patient's home regimen.  Per endo, PTA on \"Toujeo 225 units daily, novolog 100 units with each meal + CS, and ozempic 1 mg weekly. She could not tolerate empaglifozin due to frequent UTIs and metformin due to GI side effects.\" Endocrinology consulted during admission.  Insulin gtt started 2/1 per endocrinology, transitioned back to subcutaneous lantus and novolog on 2/3/23.   Recent Labs   Lab 02/22/23  1742 02/22/23  1631 02/22/23  1142 02/22/23  0759 02/22/23  0249 02/21/23  2128   * 126* 228* 234* 237* 220*   - Monitor BG TIC AC + HS + 0200  - Endocrinology continuing to follow, appreciate assistance.  Per their recs on 2/22:    - Continue Lantus 47 units qAM. 02/22/23 was increased to 55 units   - Continue Novolog custom (2/30) sliding scale insulin TID AC and HS  - Continue Novolog 1:4g CHO with breakfast and lunch, 1:5g with dinner and snacks (except 0200 snack, which should be given without coverage to limit hypoglycemia risk during sleep)  - Unable to resume PTA Ozepmic at ARU admission (not on formulary; held due to recent " NIRAJ).  Can consider substitution with liraglutide if indicated.  - Hypoglycemia protocol  - OP follow up Thelma Archibald/Dr Silva Damon, scheduled on 3/17     Necrotic wounds of Right Toes 1, 3, and 4, resolved  Present on admission, pt unaware how long wounds have been present.  Vascular consulted; JOSETTE's completed showing patent vessels and normal waveforms. They recommend orthopedic foot and ankle consult.  Ortho consulted, felt consistent with dry gangrene, plan for wound care and follow-up as outpatient.   WOCN following as inpatient and provided wound care recs.  - WOCN consulted at ARU to continue to follow, appreciate assistance.  WOCN assessed toes on 2/15, debrided devitalized skin from tips of digits 1 and 4, intact skin beneath with no open wounds, no evidence of dry gangrene.  Did lose toenails on those digits.  - Wound cares updated per [facility] recs: Bilateral toes and feet: Wash daily with warm water, soap and a washcloth, taking particular attention to wash well between and under toes. Dry thoroughly with a clean towel. Moisturize skin on bilateral feet and legs with Sween 24. Houck missing toenail beds on great and 4th toes with betadine.  Also needs consistent foot cares.  - WBAT  - Follow up with podiatry as outpatient (rescheduled from 2/17 to 3/3 with Dr. Leahy given still anticipate at ARU)     Urinary Retention  Phan catheter placed on admission for strict I/O while aggressively diuresing. Removed 2/11, pt with urinary retention and straight cath x 3, Phan replaced. Urology curbside, continue phan for now, can re-attempt voiding trial as OP.  Per patient request, with improving transfers to facilitate toileting needs, phan removed on 2/17 for trial of void.    - Is voiding, but only a few times per day with quite large volumes (800-1000 mL at times), concerned she may still be retaining, especially in setting of recent NIRAJ.  Treating UTI as below.  Need to resume regular bladder scanning if  no void for 4 hours and for PVRs.  Implement timed toileting.  If ongoing retention once UTI treated, would need to consider replacing phan.  - If ongoing retention or phan replaced, follow up with OP Urology    E coli UTI  UA on 2/19 with large leuk, pyuria, bacteriuria.  Started on empiric ciprofloxacin.  UC with >100k colonies E coli, susceptible to cipro.    - Continue cipro 500 mg BID, plan for 5-day course     Asthma  Patient endorsing mouth irritation w/ inhaler use. Per inpatient team, roof of mouth erythematous and appears excoriated but w/o evidence of yeast.   - Continue PTA inhalers (levalbuterol and spiriva)  - Magic mouthwash    Tobacco use disorder  - Continue nicotine patch 14 mcg, started during recent (December) admission  - PRN nicorette gum added 2/20 for breakthrough cravings     JYOTI  - Continue BiPAP at night  - Patient reports that she has not been using BiPAP due to issues with mask, instead using oxygen with sleep.  RT met with patient on 2/20, but patient declined all alternative mask choices and expressed preference to continue nasal cannula with sleep while here instead.    - RT met with patient again on 2/21.  Patient feels hospital mask too small despite largest size available.  Recommended to bring home equip to use. 02/22/23 ABG was drawn and reviewed     Hyperlipidemia   - Continue PTA Lipitor 40 mg daily     Acute low back pain with bilateral radiculopathy, improving  Chronic Pain  [PTA meds: Tylenol PRN, diclofenac QID PRN, gabapentin 600 mg TID, oxycodone 5 mg 5x/day]  Of note, per PDMP #140 tabs of oxycodone 5 mg were filled on 2/16 while patient hospitalized.  Patient reporting diffuse joint pain while inpatient. Using narcotics along with gabapentin, topical nsaids, and muscle relaxants. Aggressive diuresis vs diabetic neuropathy.  Pain management curbside consulted 1/31 while inpatient; decrease gabapentin to BID, stopped flexeril and added robaxin.  Pain improved initially  "at ARU admission but significantly increased pain in bilateral low back radiating to bilateral buttocks and posterior legs to feet.  Decreased with ambulation, but increased with any movement in bed.  Lumbar/thoracic xrays with grossly maintained vertebral height, no evidence of fracture.  Right thigh and hip xray with no evidence of fracture, mild degenerative changes.  US RLE 2/19 negative for DVT. US of soft issue showed 1. No fluid collection identified to suggest abscess. 2. Mild subcutaneous edema.   02/22/23 localized posterior right thigh pain seems more MSK related and secondary to severe cramps / trigger point. Will continue warm pack, added bengay qid and PT will try TENS. No underlying medical / electrolyte abnormality that is contributing at this point. Deconditioning and disuse atrophy are likely contributing. Will also discuss with Dr. Johnson to continue working on relaxation techniques and coping with chronic pain.   - Continue PRN Tylenol, voltaren (feet - not using), capsaicin (feet - not using), robaxin, and hydroxyzine. Continue stretching and modalities.  -Lidocaine patch  - Resumed PTA gabapentin 600 mg TID on 2/21  - Continue Topamax 25 mg daily  - Continue oxycodone 5 mg q6h PRN     Morbid obesity  Estimated body mass index is 77.28 kg/m  as calculated from the following:    Height as of 1/23/23: 1.702 m (5' 7\").    Weight as of this encounter: 223.8 kg (493 lb 6.4 oz).  - Complicates rehab/cares    Possible COVID-19 exposure  Per ID, patient was previously on unit with high prevalence of COVID-19.  Tested negative on 2/15.  Infection prevention recommending repeat testing to confirm negative at 5-7 days.  Ordered on 2/16 by ID provider; patient declining despite education.  Afebrile, no signs/symptoms of COVID infection.  COVID PCR negative on 2/20.        Adjustment to disability:  Clinical psychology to eval and treat if indicated  FEN: mod cons CHO, 2g NA, 2g K, 2L fluid " restriction  Bowel: continent, monitor, PRN bowel meds available  Bladder: phan removed on 2/17 for TOV as above  DVT Prophylaxis: apixaban  GI Prophylaxis: not indicated  Code: full  Disposition: goal for home  ELOS:  10-14 days  Follow up Appointments on Discharge: PCP in 1-2 weeks, cardiology ([facility], scheduled 2/24), endocrinology (Thelma Archibald/Dr Silva Damon on 3/17), urology, ortho/podiatry (3/3 with Dr. Leahy)        Helen Harris MD  Physical Medicine & Rehabilitation

## 2023-02-23 NOTE — PLAN OF CARE
/62   Pulse 102   Temp 97.3  F (36.3  C) (Oral)   Resp 16   Wt (!) 223.8 kg (493 lb 6.4 oz)   SpO2 98%   BMI 77.28 kg/m    Iso:  No active isolations  Diet: Combination Diet 2 gm K Diet; Moderate Consistent Carb (60 g CHO per Meal) Diet; 2 gm NA Diet  Fluid restriction 2000 ML FLUID  Room Service  Snacks/Supplements Adult: Other; Food Snacks BID: String cheese, crackers, fruit cup (peaches/pears or grapes) and Ensure Max chocolate @ 2 pm, and turkey sandwich on wheat bread with lettuce, tomato and quinn & a fruit cup (peaches/pears/grapes) a...  Mental Status:     Main Acuity Score: 146.25  O2:  2 LPM  Mg+: 1.7 (02/20 1919)  K:  4.3 (02/22 1631)  PLT: 251 (02/21 0643)  HGB: 12.1 (02/21 0643)  BS: 213 (02/23 0155)  No components found for: TROPL   Infusions: None  Alert and oriented x 4 . Denies having chest pain,SOB,chills and headache. Incontinent of B and B tonight. Safety checks done.  BIPAP on.No acute concerns tonight. Continue with POC

## 2023-02-23 NOTE — PLAN OF CARE
Discharge Planner Post-Acute Rehab PT:      Discharge Plan: Home (apt) 5 OCTAVIO with bilat rails, with resumed PCA services, home PT     Precautions: falls     Current Status:  Bed Mobility: SBA rolling L/R. Min A supine<>sit  Transfer: CGA/SBA with FWW  Gait: intermittent c/o R hams, but able on 2/23 to amb short dist in room with fww cga .   Stairs: 4 inch step up in bars - lead with LLE, down with RLE, cga.   Balance: able with bilat UE support on walker     Assessment: Unable to try TENS this am due to we do not have available right now.  Soft tissue massage and stretching and aarom in AM, and then mobility in PM.  Pt did not c/o pain very often in PM and did participate in short dist ambulation, and performed 4 inch step up in the bars x 2 reps with seated rest between. Pt felt pleased with her progress and abilities today.  Pt given encouragement and education that she will build confidence and strength and will achieve her stair goals, takes practice.   Other Barriers to Discharge (DME, Family Training, etc):   Stairs to enter home- pt [facility] at [facility] if needed   Pt has 4WW

## 2023-02-23 NOTE — PROGRESS NOTES
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  She is now admitted to ARU on 2/14/23 for rehabilitation.     Assessment:   1)Type II Diabetes Mellitus, uncontrolled (A1c 13.5%), with insulin resistance.    Plan:    -Lantus 55 units once daily AM,    -Novolog 1:4g CHO with breakfast/lunch, and 1:5g CHO with dinner/snacks   -patient care order: \"pt may have 0200 snack without carb coverage, to limit low BG risk overnight\"   -Novolog custom 2/30 intensity sliding scale TID AC and HS   -BG monitoring TID AC, HS, 0200                 -will discuss tomorrow starting Victoza (replacement for outpatient Ozempic)                  -Jardiance was stopped due to UTIs                 -hypoglycemia protocol                 -carb counting protocol                 -diabetes education needs will be assessed closer to discharge.     Outpatient follow up: with [facility] - has appt 3/17/23    Interval History and Assessment: interval glucose trend reviewed:   Modestly improved control with adjustments made yesterday, however she was upset about a psych visit yesterday and initially refused dinner, then ate later and did not call for carb coverage, resulting in PP check of 267.     Consistently consuming >100g CHO with most meals.  Also not sure if the below snacks between meals are being consistently covered      + UTI. Cr 1.45>1.28> today's result pending.   More " "lethargic yesterday. Not wearing BiPAP at night- but did last night. She remained very sleepy today upon eval.     She has a cup full of hard candies and empty candy wrappers/rosina cracker wrappers in a cup on her table.     Current nutritional intake and type: Orders Placed This Encounter      Combination Diet 2 gm K Diet; Moderate Consistent Carb (60 g CHO per Meal) Diet; 2 gm NA Diet      PTA Diabetes Regimen:   Tuojeo 40 unit(s) once daily at HS  Aspart 10 units with each meal + additional correction scale insulin as follows based on pre-meal glucose      -140 - 160: + 1 unit     -161 - 180: + 2 units     -181 - 200: + 3 units, and so on (sensitivity of 20 mg/dL)     Ozempic 1 mg weekly - she reports she did NOT resume as instructed     BG monitoring frequency:  3-4 times per day  BG trends at home: \"all over\" but mostly between 85 - 300      Historical:   Tuojeo (glargine U300) 225 units once daily  Novolog 100  Units with meals, plus 1/15 correction scale  Trulicity 1mg weekly on Mondays  Jardiance 25 mg daily AM - stopped 2/2 UTI's   (has not tolerated Metformin in the past)    Discharge Planning: per primary team notes, ~3/3/23.           Diabetes History:   Type of Diabetes: Type 2 Diabetes Mellitus  Lab Results   Component Value Date    A1C 13.5 12/25/2022    A1C >15.0 07/23/2021    A1C >14.0 08/19/2020    A1C 8.4 10/11/2018    A1C 9.6 05/29/2018    A1C 9.0 04/07/2017    A1C 12.1 03/10/2016              Review of Systems:     The Review of Systems is negative other than noted in the Interval History.           Medications:     Current Facility-Administered Medications   Medication     acetaminophen (TYLENOL) tablet 650 mg     albuterol (PROVENTIL HFA/VENTOLIN HFA) inhaler     albuterol (PROVENTIL) neb solution 2.5 mg     apixaban ANTICOAGULANT (ELIQUIS) tablet 5 mg     atorvastatin (LIPITOR) tablet 40 mg     benzocaine-menthol (CHLORASEPTIC) 6-10 MG lozenge 1 lozenge     bumetanide (BUMEX) tablet 1 mg     " capsaicin (ZOSTRIX) 0.025 % cream 1 applicator     ciprofloxacin (CIPRO) tablet 500 mg     clotrimazole (LOTRIMIN) 1 % cream     glucose gel 15-30 g    Or     dextrose 50 % injection 25-50 mL    Or     glucagon injection 1 mg     diclofenac (VOLTAREN) 1 % topical gel 2 g     diltiazem ER (CARDIZEM SR) 12 hr capsule 120 mg     docusate sodium (COLACE) capsule 100 mg     docusate sodium (COLACE) capsule 50 mg     enema compound (docusate/mineral oil/NaPhos) NO MAG CITRATE MIXTURE     gabapentin (NEURONTIN) capsule 600 mg     hydrALAZINE (APRESOLINE) tablet 10 mg     hydrALAZINE (APRESOLINE) tablet 10 mg     hydrOXYzine (ATARAX) tablet 25 mg    Or     hydrOXYzine (ATARAX) tablet 50 mg     insulin aspart (NovoLOG) injection (RAPID ACTING)     insulin aspart (NovoLOG) injection (RAPID ACTING)     insulin aspart (NovoLOG) injection (RAPID ACTING)     insulin aspart (NovoLOG) injection (RAPID ACTING)     insulin aspart (NovoLOG) injection (RAPID ACTING)     insulin aspart (NovoLOG) injection (RAPID ACTING)     insulin glargine (LANTUS PEN) injection 55 Units     levothyroxine (SYNTHROID/LEVOTHROID) tablet 200 mcg     levothyroxine (SYNTHROID/LEVOTHROID) tablet 400 mcg     menthol (Topical Analgesic) 2.5% (BENGAY VANISHING SCENT) 2.5 % topical gel     methocarbamol (ROBAXIN) tablet 500 mg     metoprolol succinate ER (TOPROL XL) 24 hr tablet 50 mg     miconazole (MICATIN) 2 % powder     mineral oil-hydrophilic petrolatum (AQUAPHOR)     mineral oil-hydrophilic petrolatum (AQUAPHOR)     naloxone (NARCAN) injection 0.2 mg    Or     naloxone (NARCAN) injection 0.4 mg    Or     naloxone (NARCAN) injection 0.2 mg    Or     naloxone (NARCAN) injection 0.4 mg     nicotine (NICODERM CQ) 14 MG/24HR 24 hr patch 1 patch    And     nicotine Patch in Place     nicotine (NICORETTE) gum 2 mg     oxyCODONE (ROXICODONE) tablet 5 mg     Patient is already receiving anticoagulation with heparin, enoxaparin (LOVENOX), warfarin (COUMADIN)  or  other anticoagulant medication     sennosides (SENOKOT) tablet 8.6 mg     simethicone (MYLICON) chewable tablet 80 mg     topiramate (TOPAMAX) tablet 25 mg     umeclidinium (INCRUSE ELLIPTA) 62.5 MCG/ACT inhaler 1 puff     Facility-Administered Medications Ordered in Other Encounters   Medication     sodium chloride (PF) 0.9% PF flush 10 mL            Physical Exam:    /82 (BP Location: Right arm, Patient Position: Supine, Cuff Size: Adult Regular)   Pulse 102   Temp 99.3  F (37.4  C)   Resp 16   Wt (!) 223.8 kg (493 lb 6.4 oz)   SpO2 98%   BMI 77.28 kg/m    General: sleeping in bed.   HEENT: normocephalic, atraumatic. Oral mucous membranes moist.   Lungs: mildly labored/snoring respiration, no cough  ABD: rounded, nondistended appearing.   Skin: warm and dry, no obvious lesions  Lymp:  1-2+ LE edema   Mental status: somnolent  Psych: somnolent             Data:     Recent Labs   Lab 02/23/23  1140 02/23/23  0830 02/23/23  0155 02/22/23  2208 02/22/23  1742 02/22/23  1631   * 181* 213* 267* 175* 126*     Lab Results   Component Value Date    WBC 8.1 02/21/2023    WBC 8.1 02/20/2023    WBC 6.4 02/19/2023    HGB 12.1 02/21/2023    HGB 12.3 02/20/2023    HGB 13.0 02/19/2023    HCT 41.3 02/21/2023    HCT 41.4 02/20/2023    HCT 43.5 02/19/2023    MCV 98 02/21/2023    MCV 97 02/20/2023    MCV 98 02/19/2023     02/21/2023     02/20/2023     02/19/2023     Lab Results   Component Value Date     (L) 02/22/2023     02/21/2023     02/20/2023    POTASSIUM 4.3 02/22/2023    POTASSIUM 4.7 02/21/2023    POTASSIUM 4.5 02/20/2023    CHLORIDE 99 02/22/2023    CHLORIDE 102 02/21/2023    CHLORIDE 99 02/20/2023    CO2 24 02/22/2023    CO2 27 02/21/2023    CO2 27 02/20/2023     (H) 02/23/2023     (H) 02/23/2023     (H) 02/23/2023     Lab Results   Component Value Date    BUN 52.4 (H) 02/22/2023    BUN 54.8 (H) 02/21/2023    BUN 55.9 (H) 02/20/2023     Lab  Results   Component Value Date    TSH 20.40 (H) 01/23/2023    TSH 14.30 (H) 12/25/2022    TSH 6.79 (H) 09/09/2021     Lab Results   Component Value Date    AST 24 02/22/2023    AST 30 02/19/2023    AST 37 (H) 01/29/2023    ALT 17 02/22/2023    ALT 18 02/19/2023    ALT 11 01/29/2023    ALKPHOS 122 (H) 02/22/2023    ALKPHOS 122 (H) 02/19/2023    ALKPHOS 97 01/29/2023       35 minutes spent on the date of the encounter doing chart review, history, documentation and further activities per the note        Contacting the Inpatient Diabetes Team   From 8AM-4PM: page inpatient diabetes provider that is following the patient, or utilize the job code paging system.   From 4PM-8AM: page the job code for endocrine fellow on call.       Please use the following job code to reach the Inpatient Diabetes team. Note that you must use an in house phone and that job codes cannot receive text pages.     Dial [phone] (or star-star-star [phone] on the new [employer] telephones), then [phone] to reach the endocrine-diabetes provider on call.    Jammie Chamorro PA-C  Inpatient Diabetes Management Service  Pager [phone]

## 2023-02-23 NOTE — PROGRESS NOTES
"  [facility]   Acute Rehabilitation Unit  Daily progress note    INTERVAL HISTORY  Pricilla Brown was seen and examined at bedside this morning.  She was overall doing well; denied any new pain or symptoms.  Right leg/posterior thigh pain continues to be limiting.  He had large bowel movement and 2 3 use BMs yesterday.  Discussed her pain regimen and discontinued lidocaine patches because she has not been using.    Addressed her comment about psychology visit yesterday when she told the nursing that \" she is not crazy\".  Explained the purpose of rehab psychology visit.  She noted that she was given some instructions and was not given a chance to express her concerns.  Reviewed the plan with Dr. Velazquez; please see her note for more details.      Participation in therapies is still limited by pain in the right lower extremity but may be slightly improving.      MEDICATIONS    apixaban ANTICOAGULANT  5 mg Oral BID     atorvastatin  40 mg Oral Daily     bumetanide  1 mg Oral BID     ciprofloxacin  500 mg Oral Q12H [facility] (08/20)     diltiazem ER  120 mg Oral BID     docusate sodium  50 mg Oral BID     gabapentin  600 mg Oral TID     hydrALAZINE  10 mg Oral TID     insulin aspart  1-14 Units Subcutaneous TID AC     insulin aspart  1-11 Units Subcutaneous At Bedtime     insulin aspart   Subcutaneous Daily with breakfast     insulin aspart   Subcutaneous Daily with lunch     insulin aspart   Subcutaneous Daily with supper     insulin glargine  55 Units Subcutaneous QAM     levothyroxine  200 mcg Oral Once per day on Mon Tue Wed Thu Fri Sat     levothyroxine  400 mcg Oral Weekly     menthol (Topical Analgesic) 2.5%   Topical 4x Daily     metoprolol succinate ER  50 mg Oral Daily     miconazole   Topical BID     nicotine  1 patch Transdermal Daily    And     nicotine   Transdermal Q8H [facility]     sennosides  8.6 mg Oral BID     topiramate  25 mg Oral Daily     umeclidinium  1 puff Inhalation " Daily        acetaminophen, albuterol, albuterol, sore throat, capsaicin, clotrimazole, glucose **OR** dextrose **OR** glucagon, diclofenac, docusate sodium, enema compound (docusate/mineral oil/NaPhos) NO MAG CITRATE MIXTURE, hydrALAZINE, hydrOXYzine **OR** hydrOXYzine, insulin aspart, methocarbamol, mineral oil-hydrophilic petrolatum, mineral oil-hydrophilic petrolatum, naloxone **OR** naloxone **OR** naloxone **OR** naloxone, nicotine, oxyCODONE, - MEDICATION INSTRUCTIONS -, simethicone     PHYSICAL EXAM  BP (!) 140/86 (BP Location: Right arm)   Pulse 110   Temp 97  F (36.1  C) (Oral)   Resp 18   Wt (!) 223.8 kg (493 lb 6.4 oz)   SpO2 98%   BMI 77.28 kg/m       Gen: NAD, sitting in chair  Cardio: appears well-perfused  Pulm: non-labored on room air  Abd: obese, soft, non-tender  Ext: no pitting edema in bilat lower extremities  Neuro/MSK: Full exam was deferred for conversation today  - alert and oriented. Had tenderness to palpation at right hamstring, especially medially. Also noted some mass/muscle knot at that area.     LABS  Last Basic Metabolic Panel:  Recent Labs   Lab Test 02/23/23  1155 02/23/23  1140 02/23/23  0830 02/22/23  1742 02/22/23  1631 02/21/23  0753 02/21/23  0643     --   --   --  135*  --  137   POTASSIUM 4.3  --   --   --  4.3  --  4.7   CHLORIDE 100  --   --   --  99  --  102   CO2 29  --   --   --  24  --  27   ANIONGAP 8  --   --   --  12  --  8   * 255* 181*   < > 126*   < > 209*   BUN 47.5*  --   --   --  52.4*  --  54.8*   CR 1.16*  --   --   --  1.28*  --  1.45*   GFRESTIMATED 58*  --   --   --  51*  --  44*   JUSTIN 9.2  --   --   --  9.6  --  9.2    < > = values in this interval not displayed.     CBC RESULTS:   Recent Labs   Lab Test 02/23/23  1155 02/21/23  0643 02/20/23  1920   WBC 10.4 8.1 8.1   RBC 4.21 4.20 4.27   HGB 12.3 12.1 12.3   HCT 40.1 41.3 41.4   MCV 95 98 97   MCH 29.2 28.8 28.8   MCHC 30.7* 29.3* 29.7*   RDW 16.3* 16.4* 16.6*    251 256        ASSESSMENT AND PLAN  Pricilla Brown is a 48 year old female with a past medical history of chronic HFrEF 2/2 NICM, persistent atrial fibrillation (on Eliquis), asthma, diabetes mellitus type II, hypertension, obesity, hypothyroidism 2/2 radioablation for Graves disease, JYOTI, CKD, and recent admission for HF exacerbation (12/25/22-1/1/23) who was admitted on 1/23/23 with heart failure exacerbation with hospital course complicated by encephalopathy, hypercarbia, NIRAJ, hyperkalemia, labile blood glucose, UTI, dry gangrene of toes on right foot, and pain.  She is now admitted to ARU on 2/14/23 for multidisciplinary rehabilitation and ongoing medical management.        Admission to acute inpatient rehab 02/14/23.    Impairment group code: 09 Cardiac -  heart failure exacerbation       Rehabilitation - continue comprehensive acute inpatient rehabilitation program with multidisciplinary approach including therapies, rehab nursing, and physiatry following. See interval history for updates.        Medical Conditions  New actions/orders/updates for today are in blue.     Acute on Chronic Systolic Heart Failure (EF 40-45%)  Long-Standing Persistent Atrial Fibrllation  Hypertension   Troponin Elevation 2/2 CHF  [PTA meds: apixaban 5 mg BID, atorvastatin 40 mg daily, Bumex 5 mg BID, hydralazine 12.5 mg TID, hydrochlorothiazide 12.5 mg BID, isosorbide dinitrate 20 mg TID, Toprol XL 75 mg daily, spironolactone 50 mg daily]  Pt admitted with c/o increasing SOB, MURRAY, chest tightness, weight gain in spite of compliance with PO diuretics. NT BNP 5891 (was 2030 during most recent admission).  Lactic acid 2.2 on admission with improvement to 1.1 with diuresis. Troponin mildly elevated, 67-->75-->60 likely 2/2 CHF exacerbation; no concern for ACS currently. Most recent Echo with EF 40-45%. Coronary angiogram 2012 with no significant CAD. Of note, pt recently admitted 12/25-1/1 HF exacerbation, diuresed to 472lbs. Pt weight on  admission 1/23 555lbs and down to 450lbs on discharge to ARU.  Vitals:    02/17/23 0645 02/18/23 0220 02/19/23 1100 02/20/23 1754   Weight: (!) 214.6 kg (473 lb 3.2 oz) (!) 223.6 kg (492 lb 14.4 oz) (!) 219.1 kg (483 lb 1.6 oz) (!) 221.9 kg (489 lb 3.2 oz)    02/22/23 0700   Weight: (!) 223.8 kg (493 lb 6.4 oz)   - Continue Bumex 1 mg daily, with additional prn dose for weight gain   - Per cardiology, suspect EDW is around 445-450 lbs.  Monitor daily weights.  Call [facility] clinic if weight increases 5 lbs in a day two days in a row or 10 lbs in 1 week.  Weight remains overall trending up.  S/p extra doses of Bumex on 2/18, 2/19, 2/20, and 2/21.  Discussed with IM as patient was on much higher doses of Bumex PTA (5 mg BID) yet still presented with volume overload.  However, feel she is diuresing well at this time. 02/22/23 dose was increased to 1mg bid; consider cardiology consult.   - Continue I/O, low Na diet, 2L fluid restriction   - BB: Continue Toprol XL 25mg daily  - ACEi/ARB/ARNI: Discontinued Lisinopril d/t allergy, if BP elevated, can trial Losartan    - SGLT2i: Contraindicated with recurrent yeast infections   - Aldosterone Antagonist: Discontinued Aldactone due to NIRAJ, hyperkalemia  - Rate Control: BB as above. Given mildly reduced EF, ok to use Diltiazem 120 mg BID  - Anticoagulation: CHADSVASC 3; Continue PTA apixaban  - Afterload reduction: Continue hydralazine 10 mg TID (hold if SBP <110), can give additional PRN dose if SBP >180  - [facility] consulted; follow up after ARU discharge.  Plan to discuss CardioMEMS at this appointment.  Currently scheduled on 2/24.     Chronic Kidney Disease Stage 3b  NIRAJ, improving  Longstanding history of diabetes and HTN. Baseline Cr ~1.2-1.4. Cr on admission 1.56. During hospitalization, patient diuresed to 427 lbs with NIRAJ, Cr peak 2.1. After backing off on diuretics, Cr improved to 1.4 on 2/14 at admission to ARU  - Avoid nephrotoxic agents  - Resume diuretics as listed  "above  - Trend BMP every M/Th.  Cr uptrending to 1.49 on 2/19.  2/21: Cr stable at 1.45. 02/23/23 creatinine improved today     Hyperkalemia, resolved  Potassium continually rising since 2/9 with peak 2/11 @ 7.3.  EKG without acute changes, rhythm stable in a fib.  Nephrology consulted.  Potassium shifted with insulin, calcium gluconate for cardiac stability, lokelma 15 g X 1, IV Bumex total of 6 mg on 2/11/23.  Spironolactone discontinued.  - Low K diet  - I/O  - Continue to hold spironolactone, per nephrology can consider resuming once Cr stable  - Diuresis as above  - Trend BMP every M/Th.  2/21: K stable/WNL at 4.7     Hyponatremia  In setting of aggressive diuresis.  Now mild, improved to 134 on 2/14.  - Trend BMP every M/Th.  2/21: Na stable/WNL at 137     Hypothyroidism 2/2 below  Graves Disease s/p Radioiodine Ablation  TSH 20.4 and free T4 0.82 on admission. Endocrine consulted.  - Continue levothyroxine 200 mcg M-Sat + 400 mcg on Sunday.    - Recheck TFT in a month     Type 2 Diabetes Mellitus, insulin-dependent, uncontrolled  Hyperglycemia  Diabetic neuropathy  A1c 13.5% on 12/25/2022. Discrepancies in patient's home regimen.  Per endo, PTA on \"Toujeo 225 units daily, novolog 100 units with each meal + CS, and ozempic 1 mg weekly. She could not tolerate empaglifozin due to frequent UTIs and metformin due to GI side effects.\" Endocrinology consulted during admission.  Insulin gtt started 2/1 per endocrinology, transitioned back to subcutaneous lantus and novolog on 2/3/23.   Recent Labs   Lab 02/23/23  1155 02/23/23  1140 02/23/23  0830 02/23/23  0155 02/22/23  2208 02/22/23  1742   * 255* 181* 213* 267* 175*   - Monitor BG TIC AC + HS + 0200  - Endocrinology continuing to follow, appreciate assistance.  Per their recs on 2/22:    - Continue Lantus 47 units qAM. 02/22/23 was increased to 55 units   - Continue Novolog custom (2/30) sliding scale insulin TID AC and HS  - Continue Novolog 1:4g CHO with " breakfast and lunch, 1:5g with dinner and snacks (except 0200 snack, which should be given without coverage to limit hypoglycemia risk during sleep)  - Unable to resume PTA Ozepmic at ARU admission (not on formulary; held due to recent NIRAJ).  Can consider substitution with liraglutide if indicated.  - Hypoglycemia protocol  - OP follow up Thelma Archibald/Dr Silva Damon, scheduled on 3/17     Necrotic wounds of Right Toes 1, 3, and 4, resolved  Present on admission, pt unaware how long wounds have been present.  Vascular consulted; JOSETTE's completed showing patent vessels and normal waveforms. They recommend orthopedic foot and ankle consult.  Ortho consulted, felt consistent with dry gangrene, plan for wound care and follow-up as outpatient.   WOCN following as inpatient and provided wound care recs.  - WOCN consulted at ARU to continue to follow, appreciate assistance.  WOCN assessed toes on 2/15, debrided devitalized skin from tips of digits 1 and 4, intact skin beneath with no open wounds, no evidence of dry gangrene.  Did lose toenails on those digits.  - Wound cares updated per WOC recs: Bilateral toes and feet: Wash daily with warm water, soap and a washcloth, taking particular attention to wash well between and under toes. Dry thoroughly with a clean towel. Moisturize skin on bilateral feet and legs with Sween 24. Jones missing toenail beds on great and 4th toes with betadine.  Also needs consistent foot cares.  - WBAT  - Follow up with podiatry as outpatient (rescheduled from 2/17 to 3/3 with Dr. Leahy given still anticipate at ARU)     Urinary Retention  Phan catheter placed on admission for strict I/O while aggressively diuresing. Removed 2/11, pt with urinary retention and straight cath x 3, Phan replaced. Urology curbside, continue phan for now, can re-attempt voiding trial as OP.  Per patient request, with improving transfers to facilitate toileting needs, phan removed on 2/17 for trial of void.     - Is voiding, but only a few times per day with quite large volumes (800-1000 mL at times), concerned she may still be retaining, especially in setting of recent NIRAJ.  Treating UTI as below.  Need to resume regular bladder scanning if no void for 4 hours and for PVRs.  Implement timed toileting.  If ongoing retention once UTI treated, would need to consider replacing phan.  - If ongoing retention or phan replaced, follow up with OP Urology    E coli UTI  UA on 2/19 with large leuk, pyuria, bacteriuria.  Started on empiric ciprofloxacin.  UC with >100k colonies E coli, susceptible to cipro.    - Continue cipro 500 mg BID, plan for 5-day course     Asthma  Patient endorsing mouth irritation w/ inhaler use. Per inpatient team, roof of mouth erythematous and appears excoriated but w/o evidence of yeast.   - Continue PTA inhalers (levalbuterol and spiriva)  - Magic mouthwash    Tobacco use disorder  - Continue nicotine patch 14 mcg, started during recent (December) admission  - PRN nicorette gum added 2/20 for breakthrough cravings     JYOTI  - Continue BiPAP at night  - Patient reports that she has not been using BiPAP due to issues with mask, instead using oxygen with sleep.  RT met with patient on 2/20, but patient declined all alternative mask choices and expressed preference to continue nasal cannula with sleep while here instead.    - RT met with patient again on 2/21.  Patient feels hospital mask too small despite largest size available.  Recommended to bring home equip to use. 02/22/23 ABG was drawn and reviewed     Hyperlipidemia   - Continue PTA Lipitor 40 mg daily     Acute low back pain with bilateral radiculopathy, improving  Chronic Pain  [PTA meds: Tylenol PRN, diclofenac QID PRN, gabapentin 600 mg TID, oxycodone 5 mg 5x/day]  Of note, per PDMP #140 tabs of oxycodone 5 mg were filled on 2/16 while patient hospitalized.  Patient reporting diffuse joint pain while inpatient. Using narcotics along with  "gabapentin, topical nsaids, and muscle relaxants. Aggressive diuresis vs diabetic neuropathy.  Pain management curbside consulted 1/31 while inpatient; decrease gabapentin to BID, stopped flexeril and added robaxin.  Pain improved initially at ARU admission but significantly increased pain in bilateral low back radiating to bilateral buttocks and posterior legs to feet.  Decreased with ambulation, but increased with any movement in bed.  Lumbar/thoracic xrays with grossly maintained vertebral height, no evidence of fracture.  Right thigh and hip xray with no evidence of fracture, mild degenerative changes.  US RLE 2/19 negative for DVT. US of soft issue showed 1. No fluid collection identified to suggest abscess. 2. Mild subcutaneous edema.   02/22/23 localized posterior right thigh pain seems more MSK related and secondary to severe cramps / trigger point. Will continue warm pack, added bengay qid and PT will try TENS. No underlying medical / electrolyte abnormality that is contributing at this point. Deconditioning and disuse atrophy are likely contributing. Will also discuss with Dr. Velazquez to continue working on relaxation techniques and coping with chronic pain.   - Continue PRN Tylenol, voltaren (feet - not using), capsaicin (feet - not using), robaxin, and hydroxyzine. Continue stretching and modalities.  -Lidocaine patch. 02/23/23 discontinued as she has not been using  - Resumed PTA gabapentin 600 mg TID on 2/21  - Continue Topamax 25 mg daily  - Continue oxycodone 5 mg q6h PRN     Morbid obesity  Estimated body mass index is 77.28 kg/m  as calculated from the following:    Height as of 1/23/23: 1.702 m (5' 7\").    Weight as of this encounter: 223.8 kg (493 lb 6.4 oz).  - Complicates rehab/cares    Possible COVID-19 exposure  Per ID, patient was previously on unit with high prevalence of COVID-19.  Tested negative on 2/15.  Infection prevention recommending repeat testing to confirm negative at 5-7 days.  " Ordered on 2/16 by ID provider; patient declining despite education.  Afebrile, no signs/symptoms of COVID infection.  COVID PCR negative on 2/20.        Adjustment to disability:  Clinical psychology evaluation on February 22 for Adjustment disorder with depressed mood; see consult note for more details  FEN: mod cons CHO, 2g NA, 2g K, 2L fluid restriction  Bowel: continent, monitor, PRN bowel meds available  Bladder: phan removed on 2/17 for TOV as above  DVT Prophylaxis: apixaban  GI Prophylaxis: not indicated  Code: full  Disposition: goal for home  ELOS:  10-14 days  Follow up Appointments on Discharge: PCP in 1-2 weeks, cardiology ([facility], scheduled 2/24), endocrinology (Thelma Archibald/Dr Silva Damon on 3/17), urology, ortho/podiatry (3/3 with Dr. Leahy)        Helen Harris MD  Physical Medicine & Rehabilitation

## 2023-02-23 NOTE — PROGRESS NOTES
RN called this RT stating that pt was willing to give hospital BIPAP another try tonight. Unit brought and set up and pt educated as to how to use it along with hospital BIPAP mask fitted to pt.

## 2023-02-23 NOTE — PLAN OF CARE
Goal Outcome Evaluation:      Plan of Care Reviewed With: patient    Overall Patient Progress: no change    Outcome Evaluation: No change in patient progress this shift.    Orientation: A/Ox4  Bowel: Continent of bowel using toilet in bathroom  Bladder: Continent of bladder using bedpan, but can also be incontinent. Encourage use of toilet  Pain: Complains of low back pain and R leg pain. PRN Tylenol and PRN Oxycodone given x1 with minimal relief per patient report  Ambulation/Transfers: Ax1 with walker for transfers  Blood sugars: See results tab for details  Diet/ Liquids: Tolerating diet well with good appetite. 2L FR maintained  Skin: Saeed foot dryness and cracking. Rash to bilateral axilla, breast, pannus, and priyanka area. Patient refused scheduled Micatin powder this shift  Bed alarm on for safety, call light within reach. Continue with POC.

## 2023-02-23 NOTE — PLAN OF CARE
"FOCUS/GOAL  Medical management    ASSESSMENT, INTERVENTIONS AND CONTINUING PLAN FOR GOAL:  Sleepy during the shift. Patient was upset after seen by psychologist. She stated : \" I am not crazy\" questioning the visit claiming for conspiracy, she declared not being to cooperate anymore! Reassured, PRN atarax given alone with Tylenol. She was shivering, Temp 99.3. On call contacted, lab result communicated. Nurse to monitor, avoid oxycodone and patient to sleep with Cipap per order. RT contacted, appropriate mask provided. She is currently sleeping with the machine. Temp 97.3.  at bed time.  Dinner glucose not corrected, writer was unable to establish intake. She declined supper at scheduled time than ate same bite w/o notifying staff. Had a Large BM per NA. Suppository and enema held. Voided large amount unmeasured,  Received PRN pain med once.  Fluid intake this shift 240 ML Will continue with POC.  Goal Outcome Evaluation:      Plan of Care Reviewed With: patient    Overall Patient Progress: no changeOverall Patient Progress: no change    Outcome Evaluation: No change this shift.      "

## 2023-02-23 NOTE — CONSULTS
Start Time: 0300pm   Stop Time: 0345pm  Session Duration in Minutes: 45 minutes    REASON FOR CONSULTATION: Psychology consulted to assess and provide interventions for adjustment to disability and chronic pain.     SOURCES OF INFORMATION: Information was obtained from a clinical interview with the patient and review of the medical record.    HISTORY OF PRESENT ILLNESS: Per H&P, Pricilla Brown is a 48 year old female with a past medical history of chronic HFrEF 2/2 NICM, persistent atrial fibrillation (on Eliquis), asthma, diabetes mellitus type II, hypertension, obesity, hypothyroidism 2/2 radioablation for Graves disease, JYOTI, CKD, and recent admission for HF exacerbation (12/25/22-1/1/23) who was admitted on 1/23/23 with heart failure exacerbation with hospital course complicated by encephalopathy, hypercarbia, NIRAJ, hyperkalemia, labile blood glucose, UTI, dry gangrene of toes on right foot, and pain.  She is now admitted to ARU on 2/14/23 for multidisciplinary rehabilitation and ongoing medical management.    PAST MEDICAL HISTORY: See below lists for past medical history, past surgical history, and current medications.    Past Medical History:   Diagnosis Date     A-fib (H) 2011    on coumadin since 1/13     Asthma     as a kid     Chest pain 2/1/2017     Chronic anticoagulation for a-fib 2/15/2013    INR's followed by coumadin clinic at      Diabetes mellitus (H) 2012     Diastolic heart failure 2/15/2013     Dilated cardiomyopathy (H) 1/8/2013     HTN (hypertension)      Hyperthyroidism     Graves, s/p I131 1/13, now on prednisone and methimazole     Mixed type age-related cataract, both eyes 4/8/2022     Morbid obesity (H)      Pulmonary embolism (H) 1/12    hospitalized in [place], on lovenox/coumadin for a few months but stopped, hypercoag w/u neg per pt     Sleep apnea     using CPAP       Past Surgical History:   Procedure Laterality Date     PICC INSERTION - TRIPLE LUMEN Right 01/24/2023    right cephalic  5 fr tl picc 48 cm       Current Facility-Administered Medications   Medication     acetaminophen (TYLENOL) tablet 650 mg     albuterol (PROVENTIL HFA/VENTOLIN HFA) inhaler     albuterol (PROVENTIL) neb solution 2.5 mg     apixaban ANTICOAGULANT (ELIQUIS) tablet 5 mg     atorvastatin (LIPITOR) tablet 40 mg     benzocaine-menthol (CHLORASEPTIC) 6-10 MG lozenge 1 lozenge     bumetanide (BUMEX) tablet 1 mg     capsaicin (ZOSTRIX) 0.025 % cream 1 applicator     ciprofloxacin (CIPRO) tablet 500 mg     clotrimazole (LOTRIMIN) 1 % cream     glucose gel 15-30 g    Or     dextrose 50 % injection 25-50 mL    Or     glucagon injection 1 mg     diclofenac (VOLTAREN) 1 % topical gel 2 g     diltiazem ER (CARDIZEM SR) 12 hr capsule 120 mg     docusate sodium (COLACE) capsule 100 mg     docusate sodium (COLACE) capsule 50 mg     enema compound (docusate/mineral oil/NaPhos) NO MAG CITRATE MIXTURE     gabapentin (NEURONTIN) capsule 600 mg     hydrALAZINE (APRESOLINE) tablet 10 mg     hydrALAZINE (APRESOLINE) tablet 10 mg     hydrOXYzine (ATARAX) tablet 25 mg    Or     hydrOXYzine (ATARAX) tablet 50 mg     insulin aspart (NovoLOG) injection (RAPID ACTING)     insulin aspart (NovoLOG) injection (RAPID ACTING)     insulin aspart (NovoLOG) injection (RAPID ACTING)     insulin aspart (NovoLOG) injection (RAPID ACTING)     insulin aspart (NovoLOG) injection (RAPID ACTING)     insulin aspart (NovoLOG) injection (RAPID ACTING)     insulin glargine (LANTUS PEN) injection 55 Units     levothyroxine (SYNTHROID/LEVOTHROID) tablet 200 mcg     levothyroxine (SYNTHROID/LEVOTHROID) tablet 400 mcg     Lidocaine (LIDOCARE) 4 % Patch 2 patch     lidocaine patch in PLACE     menthol (Topical Analgesic) 2.5% (BENGAY VANISHING SCENT) 2.5 % topical gel     methocarbamol (ROBAXIN) tablet 500 mg     metoprolol succinate ER (TOPROL XL) 24 hr tablet 50 mg     miconazole (MICATIN) 2 % powder     mineral oil-hydrophilic petrolatum (AQUAPHOR)     mineral  "oil-hydrophilic petrolatum (AQUAPHOR)     naloxone (NARCAN) injection 0.2 mg    Or     naloxone (NARCAN) injection 0.4 mg    Or     naloxone (NARCAN) injection 0.2 mg    Or     naloxone (NARCAN) injection 0.4 mg     nicotine (NICODERM CQ) 14 MG/24HR 24 hr patch 1 patch    And     nicotine Patch in Place     nicotine (NICORETTE) gum 2 mg     oxyCODONE (ROXICODONE) tablet 5 mg     Patient is already receiving anticoagulation with heparin, enoxaparin (LOVENOX), warfarin (COUMADIN)  or other anticoagulant medication     polyethylene glycol (MIRALAX) Packet 17 g     sennosides (SENOKOT) tablet 8.6 mg     simethicone (MYLICON) chewable tablet 80 mg     topiramate (TOPAMAX) tablet 25 mg     umeclidinium (INCRUSE ELLIPTA) 62.5 MCG/ACT inhaler 1 puff     Facility-Administered Medications Ordered in Other Encounters   Medication     sodium chloride (PF) 0.9% PF flush 10 mL       PSYCHIATRIC HISTORY: It was difficult to formally assess psychiatric history due to patient escalation (see below), however, Pricilla kept repeated she was never \"crazy,\".   Record review revealed no history of depression or anxiety, substance or alcohol abuse or dependence.  She does smoke about 5-6 cigarettes daily.      BRIEF PSYCHOSOCIAL HISTORY: Pricilla lives alone in her own home.  She is not working and supporting herself through sliding scale insulin benefits.  Education and employment were not discussed due to escalation.     MENTAL STATUS:    Appearance/Behavior/Orientation:  Pricilla was alert and oriented.  There was no evidence of psychomotor agitation or slowing.  Cooperation/Reliability: Pricilla was quite agitation upon providing psychoeducation about sleep.  It was hard to re-direct towards productive interventions and psychoeducation.  She appeared to be open and honest about her functioning, but less amenable to psychological intervention.    Cognition/Memory/Judgment:  Not formally assessed, yet no difficulties apparent upon interview. Fund " "of knowledge consistent with age, level of education, and life experience. Abstract reasoning appropriate, no difficulties with judgment apparent.  Speech/Language:  Speech was clear, logical and coherent, of normal rate, rhythm but loud in volume - although Pricilla readily admits to knowing this about herself.   Thought Content/Form: Thoughts and motivations were contradictory at times, and Pricilla did not appear to notice these contradictions, nor appear to appreciate when the contradictions were brought to light.    Mood/Affect: Labile.  She oscillated from cooperative and participatory, to sad and defensive about her level of functioning.   Appetite: Not assessed.   Insight/Motivation:  Limited into her functioning and how her behaviors and mindset contribute to her level of functioning.  She appeared motivated for treatment, while at the same time did not appreciate psychoeducation or direction to other/new behaviors that could improve her status.   Suicide/Assault: Not assessed formally, but no SI/HI were noted when talked about current mood. Nothing notable per team.     PRESENTING PROBLEM:   Pricilla was easily escalated during our session.  Minor psychoeducation about sleep hygiene was provided, which then caused Pricilla to spend the majority of our session discussing the challenges she has had while she is in the hospital and rehabilitation, including difficulties in getting her sleep mask (CPAP) or finding one that fit, being woken several times while sleeping, people \"yanking\" on her arm that is in pain, and in general feeling disrespected while on the unit. It was hard to redirect her, offer cognitive thought reframing, provide education, or reflective/activie listening summaries.     She endorsed significant pain in her lower extremities that impacts her sleep, ambulation, and tranfers.     An introduction to sleep hygiene was started, although Pricilla was encouraged to read about this on her own time. " "    IMPRESSION: Based on the interview the patient presents with symptoms consistent with adjustment disorder with depressed mood, although this should continued to be assessed and updated with continued monitoring and intervention.  Pricilla appears to have contradictory thought processes with little tolerance for education or suggestion for other ways of thinking about or addressing a situation. She appeared to be quite motivated to express her own health literacy and she appeared motivated her in own decision-making and health-related behaviors.  However, she also appeared to make inappropriate logical jumps from the information provided and what this means for her own behaviors with little space allowed for correcting that illogical jump.       THERAPEUTIC INTERVENTION: listening, thought reframing, psychoeducation    DIAGNOSIS: Adjustment disorder with depressed mood.     RECOMMENDATION/PLAN:  1. Pricilla does not want psychotherapy services going forward stating she \"is not crazy.\"  I will still place a short session on my schedule to check-in with her, but do not push psychotherapy services.  2. Pricilla will benefit from several approach/relationship strategies to optimize her participation in therapy and promote actual behavioral change.    A. Pricilla will benefit from working with the same staff as often as possible.    B. Spend the first 5-10 minutes of each interaction building rapport and listening to concerns.  She is very attuned to feeling disrespected and listening for several minutes will likely take optimize the relationship and therapeutic participation.    C.  When possible - present choices and allow Pricilla to exercise her independence and promote good behavioral change.    D.  Present new information in exciting ways.  She may benefit from framing new information in ways such as \"You might like learning about [topic]!  Why don't you read some information on it, and we will discuss what you learned later.\" "     E.  Pricilla tends to escalate when others uncover or bring  To the surface her illogical jumps or inconsistencies in her logic.  Best to roll with resistance.   3.  We briefly discussed deep breathing when it comes to chronic pain, although Pricilla did not want to practice this in session.   4.  Information will be communicated to the team.  I am available for consultation as needed.  The team may consider a behavioral/dignity plan should Pricilla continue to struggle with the team on the ARU.     Manpreet DiazyD

## 2023-02-24 NOTE — PROGRESS NOTES
ARU Progress Note          Assessment & Plan:   Pricilla Brown is a 48 year old female  w/ PMH HFmrEF (EF 40-45%) 2/2 NICM, long-standing persistent AF, HTN, type II DM, morbid obesity, asthma, JYOTI and Graves disease s/p radioiodine ablation c/b hypothyroid  admitted on 1/23/2023 to [facility]. She is admitted to ARU on 2/14/23.        #Acute on Chronic Systolic Heart Failure (EF 40-45%)  #Long-Standing Persistent Atrial Fibrllation  #Hypertension   #Troponin Elevation 2/2 CHF, resolved   Pt admitted with c/o increasing SOB, MURRAY, chest tightness, weight gain in spite of compliance with PO diuretics. NT BNP 5891 (was 2030 during most recent admission).  Lactic acid 2.2 on admission with improvement to 1.1 with diuresis. Troponin mildly elevated, 67-->75-->60 likely 2/2 CHF exacerbation; no concern for ACS currently. Most recent Echo with EF 40-45%. Coronary angiogram 2012 with no significant CAD. Of note, pt recently admitted 12/25-1/1 HF exacerbation, diuresed to 472lbs. Pt weight on admission 1/23 555lbs. Noted weight gain.  Diuresing well when looking at intake and output.  - Volume Status: Euvolemic  - Diuresis: PO Bumex 1 mg daily increased to twice daily after consistently needing extra dose daily.  Continue with this dosing at discharge if kidney function remains at baseline.   - Beta Blocker: Continue Toprol XL to 50mg  - ACEi/ARB/ARNI: contraindicated d/t lisinopril allergy.   - SGLT2i: Contraindicated with recurrent yeast infections   - Aldosterone Antagonist: Hold/discontinue PTA Spironolactone 50mg d/t hyperkalemia  - Rate Control: BB as above. Cont diltiazem 120 BID due to persistent tachycardia and borderline reduced EF.   - Afterload reduction: Cont hydralazine 10mg PO TID (hold parameters SBP <110), can give additional prn if sbp >180  - Anticoagulation: CHADSVASC 3; Continue PTA apixaban  - Strict I/O  -Daily Weights-fluctuate greatly due to inaccuracy of bed weights and patient  unable to fit on standing scale  - Low Na Diet / 2L Fluid Restriction  - BMP M, Th   - C[facility] clinic to continue to follow   -We will need to follow-up with cardiology at discharge    #JYOTI  #Asthma  Patient had not been using BiPAP due to issues with mask, and instead was using oxygen with sleep.  RT continuously meeting with patient to try different masks.  Patient declining mass choices and using nasal cannula with sleep.  Noted to be very lethargic 2/22 and strongly encouraged patient to use BiPAP.  ABGs checked-pH 7.39, PCO2 arterial 50, PCO2 arterial 65, bicarb 30, base excess arterial 4.0.  Patient has been now using BiPAP at bedtime and with naps.  She is much more alert and oriented today and verbalizes that she has more energy throughout the day.  -Incruse Ellipta (substituted for Spiriva) 1 puff inhalation daily  -Continue to encourage use of BiPAP at bedtime and with naps  -RT following, appreciate recs  -Albuterol inhaler as needed    #Type 2 Diabetes Mellitus, uncontrolled (A1c 13.5%) with insulin resistance  Was briefly on insulin drip while inpatient. Has tried Metformin in the past and was incontinent of stool the diarrhea was so severe.   Endocrinology following- recs per 2/20/23 note:    -lantus 55 units daily (may change back to Tuojeo on discharge)               -Novolog increased to uniformly 1 per 4 grams with breakfast and lunch.  Continue 1:5 with dinner and snacks.                 -Patient care order: Patient may have 0200 snack without carb coverage, to limit low blood glucose risk overnight                 -Continue Novolog custom 2/30 sliding scale TID AC and HS                  -BG monitoring TID AC, HS, 0200                 -holding PTA Ozempic, due to NIRAJ.  Endocrine will discuss starting Victoza 2/24                 -(Jardiance was stopped due to UTIs)                 -hypoglycemia protocol                 -carb counting protocol                 -diabetes education needs will be  "assessed closer to discharge.   -Outpatient follow-up with [facility] endocrinology, Dr. Ambrocio on 3/17/2023  Recent Labs   Lab 02/24/23  0219 02/23/23  2253 02/23/23  1557 02/23/23  1155 02/23/23  1140 02/23/23  0830   * 206* 220* 259* 255* 181*       Right posterior thigh pain-improving  Patient continues to complain with mid-right thigh pain \"jelena horse\" that is constant.    Patient states that the pain will start in the thigh and radiate up to the back.  Back pain is not constant.  PT notes very likely is hamstrings.  No e/o cellulitis or abscess on exam. XR right thigh and hip: no e/o fracture and mild degenerative changes. US right LE no DVT and no clear e/o abscess or cellulitis  -topical icyhot  -Continue therapies  -Acetaminophen, capsaicin cream, diclofenac gel, Robaxin, oxycodone as needed for pain     Right hand unilateral tremor-resolved  Patient states she started noticing a tremor in her right hand this morning.  She states it was so bad that she was spilling her breakfast.  She states that the tremor has decreased in severity throughout the day.  Discussed stress, anxiety, changes in medications.  Patient believes this is a side effect of Topamax which she was started on for weight loss.  -Discontinue Topamax     Acute on chronic Kidney Disease Stage 3b  Hyperkalemia, resolved  Longstanding history of diabetes and HTN. Baseline Creatinine appears to be around 1.2-1.4. Elevated until recently prior to discharge in the setting of aggressive diuresis. Creatinine on ARU admission 1.4. K up to 7.3 (2/9) w/o EKG changes. Nephrology consulted inpatient. Cr stable at ARU 1.2-1.5.   - BMP M, Th  - Hold spironolactone (high K)  - bumex 1mg twice daily  - Avoid nephrotoxic agents     Right toe wound, necrotic toes  Present on admission, right great toe and third toe, dry gangrene, patient on or how long wounds have been present.  Vascular, podiatry, and orthopedics consulted while inpatient.  " ABIs with normal waveforms.  -WOCN: Wound cares to right toe daily.  Betadine to be applied to toes and be sure to apply over dry eschar.  Okay to leave open to air.  No soaking feet  -Ortho follow-up to discuss elective amputation on 3/3 with Dr. Leahy  -Elevate lower extremities, weight-bear as tolerated     Graves Disease s/p Radioiodine Ablation  Hypothyroidism 2/2 Radioiodine Ablation  TSH 20.4 and free T4 0.82 on admission. Endocrine followed during inpatient admission and updated levothyroxine dosing.   - Continue levothyroxine 200 mcg M-Sat- 400 mcg on Sunday.    - Recheck thyroid function tests in a month (March 2023)     Stable and resolved:  Urinary retention, resolved-Keenan removed 2/17  Acute simple cystitis, resolved  Hyperlipidemia-continue Lipitor 40 mg daily  Asthma, mild intermittent-continue PTA inhalers  Chronic upper and lower extremity joint pain-as needed lidocaine patches, encourage increased mobility          Dania Humphries, CNP, APRN  Internal Medicine [facility]  Pager [phone]            Interval History:   Chief complaint of frustration of prolonged acute rehab stay.  Patient in room, up in chair and watching television.  Lethargy, somnolence has resolved since using BiPAP at night and for naps.  Patient is happy with therapies and states that she is progressing.  Pain is better controlled.  She has more energy throughout the day.  Patient was started on Topamax for weight loss and patient stopped it due to side effects of tremors shakiness.  Had been restarted this hospitalization as a home medication.  Discontinued from scheduled meds and discontinued from home medication list. Discussed fluctuating weights. Pt states she feels todays weight was accurate. She is voiding adequate amounts.          Physical Exam:   Blood pressure 130/84, pulse 99, temperature 97.6  F (36.4  C), temperature source Axillary, resp. rate 18, weight (!) 205 kg (452 lb), SpO2 99 %, not  currently breastfeeding.    GENERAL: Alert and oriented x 3. Well nourished, well developed.  No acute distress.    HEENT: Normocephalic, atraumatic. Anicteric sclera. Mucous membranes moist.   CV: RRR. S1, S2. No murmurs appreciated.   RESPIRATORY: Effort normal on RA. Lungs CTAB with no wheezing, rales, or rhonchi.   GI: Abdomen soft and non distended, bowel sounds present x all 4 quadrants. No tenderness, rebound, or guarding.   NEUROLOGICAL: No focal deficits. Follows commands.  Strength equal in upper and lower extremities.   MUSCULOSKELETAL: No joint swelling or tenderness. Moves all extremities.   EXTREMITIES: No gross deformities. +1 b/l LE edema   SKIN: Grossly warm, dry, and intact. No jaundice. No rashes.       ROUTINE IP LABS (Last four results)  CMP   Recent Labs   Lab 02/24/23  0219 02/23/23  2253 02/23/23  1557 02/23/23  1155 02/22/23  1742 02/22/23  1631 02/21/23  0753 02/21/23  0643 02/20/23  2106 02/20/23  1919 02/19/23  0738 02/19/23  0649   NA  --   --   --  137  --  135*  --  137  --  137  --  137   POTASSIUM  --   --   --  4.3  --  4.3  --  4.7  --  4.5  --  4.7   CHLORIDE  --   --   --  100  --  99  --  102  --  99  --  100   CO2  --   --   --  29  --  24  --  27  --  27  --  28   ANIONGAP  --   --   --  8  --  12  --  8  --  11  --  9   * 206* 220* 259*   < > 126*   < > 209*   < > 211*   < > 192*   BUN  --   --   --  47.5*  --  52.4*  --  54.8*  --  55.9*  --  57.0*   CR  --   --   --  1.16*  --  1.28*  --  1.45*  --  1.44*  --  1.49*   JUSTIN  --   --   --  9.2  --  9.6  --  9.2  --  9.2  --  9.1   MAG  --   --   --  1.8  --   --   --   --   --  1.7  --  1.9   PHOS  --   --   --  3.5  --   --   --   --   --  3.2  --   --    PROTTOTAL  --   --   --   --   --  8.4*  --   --   --   --   --  7.6   ALBUMIN  --   --   --   --   --  3.1*  --   --   --   --   --  2.9*   BILITOTAL  --   --   --   --   --  0.8  --   --   --   --   --  0.6   ALKPHOS  --   --   --   --   --  122*  --   --   --   --    --  122*   AST  --   --   --   --   --  24  --   --   --   --   --  30   ALT  --   --   --   --   --  17  --   --   --   --   --  18    < > = values in this interval not displayed.     CBC   Recent Labs   Lab 02/23/23  1155 02/21/23  0643 02/20/23  1920 02/19/23  1024   WBC 10.4 8.1 8.1 6.4   RBC 4.21 4.20 4.27 4.45   HGB 12.3 12.1 12.3 13.0   HCT 40.1 41.3 41.4 43.5   MCV 95 98 97 98   MCH 29.2 28.8 28.8 29.2   MCHC 30.7* 29.3* 29.7* 29.9*   RDW 16.3* 16.4* 16.6* 16.5*    251 256 183     INR No lab results found in last 7 days.    OTHER:  NA

## 2023-02-24 NOTE — PLAN OF CARE
Discharge Planner Post-Acute Rehab OT:     Discharge Plan: home with pca 10 hrs during the day and 4 at night    Precautions: falls, bariatric equipment required    Current Status:  ADLs:    Mobility: SBA asha FWW in-room distances.    Grooming: IND seated w/ set up.    Dressing:  UB:ot pt able todon pull over shirt after clothes retrieve for her. LB- with extra time while sitting on commode over toilet with use of reacher able to get both legs into shorts and up legs max a to adjust over hips.standing at fww.     Feet- socks pt able to don after set up of sock aide no instruction needed    Bathing: pt able towash 8/10 areas assist with feet and buttocks. dep liko to rollling shower chair    Toileting: Transfer SBA FWW <> asha commode overlay on toilet. Hygiene mod i sitting with priyanka cares with toilet aide while sitting.  IADLs:tba  Vision/Cognition: Anxious. assess higher cog     Assessment:increase bed mobility this date mod I to L side of bed.OT: Facilitated tabletop activity to promote standing tolerance, seated reach, and core stability. Education on fall prevention strategy for STS from reclining chair to wc including clothing mgmt as gown material was stuck in foot rest area. Pt demonstrated good safety awareness throughout session, motivated to pariticpate. Educaition on importance of good skin integrity including using lotion to avoid excessive dryness on BLE.  Other Barriers to Discharge (DME, Family Training, etc):  Will likely need:   -bariatric commode overlay for toilet during daytime and to use as BSC for nighttime PRN  -bariatric extended tub bench-toilet aide   -wide sock aide    pt has in room sock aide dressing stick , reacher and toilet hygiene aide, leg

## 2023-02-24 NOTE — PLAN OF CARE
"Discharge Planner Post-Acute Rehab PT:      Discharge Plan: Home (apt) 4 steps  with bilat rails + 1 platform, with resumed PCA services, home PT     Precautions: falls     Current Status:  Bed Mobility: SBA rolling L/R. Min A supine<>sit  Transfer: SBA with FWW  Gait: intermittent c/o R hams, but able on 2/23 to amb short dist in room with fww cga .   Stairs: 4 inch step up in bars - lead with LLE, down with RLE, cga.   Balance: able with bilat UE support on walker     Assessment: pt unable to complete 6\" step today, pm session pt states she she more fatigued. Extensive conversation today about safety concerns with OCTAVIO apt, and considerations for alternate dispo upon discharge - see theract in pm PT flowsheet for details.     Other Barriers to Discharge (DME, Family Training, etc):   Stairs to enter home- pt states she [facility] at [facility] w/o stairs if needed. 2/24 - pt stating she has more than one option for alternate dispo where there are no stairs to enter   Pt's SO to measure step height, bed height  Pt has 4WW  "

## 2023-02-24 NOTE — PROGRESS NOTES
[facility]   Acute Rehabilitation Unit  Daily progress note    INTERVAL HISTORY  Pricilla Brown was seen and examined at bedside this morning.  She was doing well. Pain in RLE has improved and she was able to practice stairs today. Noted that stairs at her house are smaller; will communicate with the therapy team. Reviewed the option of going to her sister's house but she was not interested in that. Might consider talking to her cadi team to transition to a more accessible home soon.       Now able to ambulate short distances with CGA; also tried 4 inch steps in // bars.       MEDICATIONS    apixaban ANTICOAGULANT  5 mg Oral BID     atorvastatin  40 mg Oral Daily     bumetanide  1 mg Oral BID     ciprofloxacin  500 mg Oral Q12H [facility] (08/20)     diltiazem ER  120 mg Oral BID     docusate sodium  50 mg Oral BID     gabapentin  600 mg Oral TID     hydrALAZINE  10 mg Oral TID     insulin aspart  1-14 Units Subcutaneous TID AC     insulin aspart  1-11 Units Subcutaneous At Bedtime     insulin aspart   Subcutaneous Daily with breakfast     insulin aspart   Subcutaneous Daily with lunch     insulin aspart   Subcutaneous Daily with supper     insulin glargine  60 Units Subcutaneous QAM     levothyroxine  200 mcg Oral Once per day on Mon Tue Wed Thu Fri Sat     levothyroxine  400 mcg Oral Weekly     [START ON 2/25/2023] liraglutide  0.6 mg Subcutaneous Daily     menthol (Topical Analgesic) 2.5%   Topical 4x Daily     metoprolol succinate ER  50 mg Oral Daily     miconazole   Topical BID     nicotine  1 patch Transdermal Daily    And     nicotine   Transdermal Q8H [facility]     sennosides  8.6 mg Oral BID     umeclidinium  1 puff Inhalation Daily        acetaminophen, albuterol, albuterol, sore throat, capsaicin, clotrimazole, glucose **OR** dextrose **OR** glucagon, diclofenac, docusate sodium, enema compound (docusate/mineral oil/NaPhos) NO MAG CITRATE MIXTURE, hydrALAZINE, hydrOXYzine **OR**  hydrOXYzine, insulin aspart, methocarbamol, mineral oil-hydrophilic petrolatum, mineral oil-hydrophilic petrolatum, naloxone **OR** naloxone **OR** naloxone **OR** naloxone, nicotine, oxyCODONE, - MEDICATION INSTRUCTIONS -, simethicone     PHYSICAL EXAM  /82 (BP Location: Right arm, Patient Position: Semi-Sheffield's)   Pulse 98   Temp 97  F (36.1  C)   Resp 20   Wt (!) 205 kg (452 lb)   SpO2 96%   BMI 70.79 kg/m       Gen: NAD, sitting in chair  Cardio: appears well-perfused  Pulm: non-labored on room air  Abd: obese, soft, non-tender  Ext: lymphedema wraps in place - severe bilateral onychomycosis  Neuro/MSK: Full exam was deferred for conversation today  - alert and oriented. Had tenderness to palpation at right hamstring, especially medially. Also noted some mass/muscle knot at that area.     LABS  Last Basic Metabolic Panel:  Recent Labs   Lab Test 02/24/23  1208 02/24/23  0824 02/24/23  0219 02/23/23  1557 02/23/23  1155 02/22/23  1742 02/22/23  1631 02/21/23  0753 02/21/23  0643   NA  --   --   --   --  137  --  135*  --  137   POTASSIUM  --   --   --   --  4.3  --  4.3  --  4.7   CHLORIDE  --   --   --   --  100  --  99  --  102   CO2  --   --   --   --  29  --  24  --  27   ANIONGAP  --   --   --   --  8  --  12  --  8   * 320* 228*   < > 259*   < > 126*   < > 209*   BUN  --   --   --   --  47.5*  --  52.4*  --  54.8*   CR  --   --   --   --  1.16*  --  1.28*  --  1.45*   GFRESTIMATED  --   --   --   --  58*  --  51*  --  44*   JUSTIN  --   --   --   --  9.2  --  9.6  --  9.2    < > = values in this interval not displayed.     CBC RESULTS:   Recent Labs   Lab Test 02/23/23  1155 02/21/23  0643 02/20/23  1920   WBC 10.4 8.1 8.1   RBC 4.21 4.20 4.27   HGB 12.3 12.1 12.3   HCT 40.1 41.3 41.4   MCV 95 98 97   MCH 29.2 28.8 28.8   MCHC 30.7* 29.3* 29.7*   RDW 16.3* 16.4* 16.6*    251 256       ASSESSMENT AND PLAN  Pricilla Brown is a 48 year old female with a past medical history of chronic  HFrEF 2/2 NICM, persistent atrial fibrillation (on Eliquis), asthma, diabetes mellitus type II, hypertension, obesity, hypothyroidism 2/2 radioablation for Graves disease, JYOTI, CKD, and recent admission for HF exacerbation (12/25/22-1/1/23) who was admitted on 1/23/23 with heart failure exacerbation with hospital course complicated by encephalopathy, hypercarbia, NIRAJ, hyperkalemia, labile blood glucose, UTI, dry gangrene of toes on right foot, and pain.  She is now admitted to ARU on 2/14/23 for multidisciplinary rehabilitation and ongoing medical management.        Admission to acute inpatient rehab 02/14/23.    Impairment group code: 09 Cardiac -  heart failure exacerbation       Rehabilitation - continue comprehensive acute inpatient rehabilitation program with multidisciplinary approach including therapies, rehab nursing, and physiatry following. See interval history for updates.        Medical Conditions  New actions/orders/updates for today are in blue.     Acute on Chronic Systolic Heart Failure (EF 40-45%)  Long-Standing Persistent Atrial Fibrllation  Hypertension   Troponin Elevation 2/2 CHF  [PTA meds: apixaban 5 mg BID, atorvastatin 40 mg daily, Bumex 5 mg BID, hydralazine 12.5 mg TID, hydrochlorothiazide 12.5 mg BID, isosorbide dinitrate 20 mg TID, Toprol XL 75 mg daily, spironolactone 50 mg daily]  Pt admitted with c/o increasing SOB, MURRAY, chest tightness, weight gain in spite of compliance with PO diuretics. NT BNP 5891 (was 2030 during most recent admission).  Lactic acid 2.2 on admission with improvement to 1.1 with diuresis. Troponin mildly elevated, 67-->75-->60 likely 2/2 CHF exacerbation; no concern for ACS currently. Most recent Echo with EF 40-45%. Coronary angiogram 2012 with no significant CAD. Of note, pt recently admitted 12/25-1/1 HF exacerbation, diuresed to 472lbs. Pt weight on admission 1/23 555lbs and down to 450lbs on discharge to ARU.  Vitals:    02/18/23 0220 02/19/23 1100 02/20/23  1754 02/22/23 0700   Weight: (!) 223.6 kg (492 lb 14.4 oz) (!) 219.1 kg (483 lb 1.6 oz) (!) 221.9 kg (489 lb 3.2 oz) (!) 223.8 kg (493 lb 6.4 oz)    02/24/23 0400   Weight: (!) 205 kg (452 lb)   - Continue Bumex 1 mg daily, with additional prn dose for weight gain;S/p extra doses of Bumex on 2/18, 2/19, 2/20, and 2/21. 02/22/23 dose was increased to 1mg bid  - Per cardiology, suspect EDW is around 445-450 lbs.  Monitor daily weights.  Call [facility] clinic if weight increases 5 lbs in a day two days in a row or 10 lbs in 1 week.  Weight remains variable and not very accurate  - Continue I/O, low Na diet, 2L fluid restriction   - BB: Continue Toprol XL 25mg daily  - ACEi/ARB/ARNI: Discontinued Lisinopril d/t allergy, if BP elevated, can trial Losartan    - SGLT2i: Contraindicated with recurrent yeast infections   - Aldosterone Antagonist: Discontinued Aldactone due to NIRAJ, hyperkalemia  - Rate Control: BB as above. Given mildly reduced EF, ok to use Diltiazem 120 mg BID  - Anticoagulation: CHADSVASC 3; Continue PTA apixaban  - Afterload reduction: Continue hydralazine 10 mg TID (hold if SBP <110), can give additional PRN dose if SBP >180  - [facility] consulted; follow up after ARU discharge.  Plan to discuss CardioMEMS at this appointment.  Currently scheduled on 2/24.     Chronic Kidney Disease Stage 3b  NIRAJ, improving  Longstanding history of diabetes and HTN. Baseline Cr ~1.2-1.4. Cr on admission 1.56. During hospitalization, patient diuresed to 427 lbs with NIRAJ, Cr peak 2.1. After backing off on diuretics, Cr improved to 1.4 on 2/14 at admission to ARU  - Avoid nephrotoxic agents  - Resume diuretics as listed above  - Trend BMP every M/Th.  Cr uptrending to 1.49 on 2/19.  2/21: Cr stable at 1.45. 02/23/23 creatinine improved today     Hyperkalemia, resolved  Potassium continually rising since 2/9 with peak 2/11 @ 7.3.  EKG without acute changes, rhythm stable in a fib.  Nephrology consulted.  Potassium shifted with  "insulin, calcium gluconate for cardiac stability, lokelma 15 g X 1, IV Bumex total of 6 mg on 2/11/23.  Spironolactone discontinued.  - Low K diet  - I/O  - Continue to hold spironolactone, per nephrology can consider resuming once Cr stable  - Diuresis as above  - Trend BMP every M/Th.  2/21: K stable/WNL at 4.7     Hyponatremia  In setting of aggressive diuresis.  Now mild, improved to 134 on 2/14.  - Trend BMP every M/Th.  2/21: Na stable/WNL at 137     Hypothyroidism 2/2 below  Graves Disease s/p Radioiodine Ablation  TSH 20.4 and free T4 0.82 on admission. Endocrine consulted.  - Continue levothyroxine 200 mcg M-Sat + 400 mcg on Sunday.    - Recheck TFT in a month     Type 2 Diabetes Mellitus, insulin-dependent, uncontrolled  Hyperglycemia  Diabetic neuropathy  A1c 13.5% on 12/25/2022. Discrepancies in patient's home regimen.  Per endo, PTA on \"Toujeo 225 units daily, novolog 100 units with each meal + CS, and ozempic 1 mg weekly. She could not tolerate empaglifozin due to frequent UTIs and metformin due to GI side effects.\" Endocrinology consulted during admission.  Insulin gtt started 2/1 per endocrinology, transitioned back to subcutaneous lantus and novolog on 2/3/23.   Recent Labs   Lab 02/24/23  1208 02/24/23  0824 02/24/23  0219 02/23/23  2253 02/23/23  1557 02/23/23  1155   * 320* 228* 206* 220* 259*   - Monitor BG TIC AC + HS + 0200  - Endocrinology continuing to follow, appreciate assistance.  Per their recs on 2/22:    - Continue Lantus 47 units qAM. 02/22/23 was increased to 55 units. 02/24/23 increased to 60 units  - Continue Novolog custom (2/30) sliding scale insulin TID AC and HS  - Continue Novolog 1:4g CHO with breakfast and lunch, 1:5g with dinner and snacks (except 0200 snack, which should be given without coverage to limit hypoglycemia risk during sleep)  - Unable to resume PTA Ozepmic at ARU admission (not on formulary; held due to recent NIRAJ).  Can consider substitution with " liraglutide if indicated. 02/25/23 started victoza  - Hypoglycemia protocol  - OP follow up Thelma Archibald/Dr Silva Damon, scheduled on 3/17     Necrotic wounds of Right Toes 1, 3, and 4, resolved  Present on admission, pt unaware how long wounds have been present.  Vascular consulted; JOSETTE's completed showing patent vessels and normal waveforms. They recommend orthopedic foot and ankle consult.  Ortho consulted, felt consistent with dry gangrene, plan for wound care and follow-up as outpatient.   WOCN following as inpatient and provided wound care recs.  - WOCN consulted at ARU to continue to follow, appreciate assistance.  WOCN assessed toes on 2/15, debrided devitalized skin from tips of digits 1 and 4, intact skin beneath with no open wounds, no evidence of dry gangrene.  Did lose toenails on those digits.  - Wound cares updated per [facility] recs: Bilateral toes and feet: Wash daily with warm water, soap and a washcloth, taking particular attention to wash well between and under toes. Dry thoroughly with a clean towel. Moisturize skin on bilateral feet and legs with Sween 24. Secor missing toenail beds on great and 4th toes with betadine.  Also needs consistent foot cares.  - WBAT  - Follow up with podiatry as outpatient (rescheduled from 2/17 to 3/3 with Dr. Leahy given still anticipate at ARU)     Urinary Retention  Phan catheter placed on admission for strict I/O while aggressively diuresing. Removed 2/11, pt with urinary retention and straight cath x 3, Phan replaced. Urology curbside, continue phan for now, can re-attempt voiding trial as OP.  Per patient request, with improving transfers to facilitate toileting needs, phan removed on 2/17 for trial of void.    - Is voiding, but only a few times per day with quite large volumes (800-1000 mL at times), concerned she may still be retaining, especially in setting of recent NIRAJ.  Treating UTI as below.  Need to resume regular bladder scanning if no void for  4 hours and for PVRs.  Implement timed toileting.  If ongoing retention once UTI treated, would need to consider replacing phan.  - If ongoing retention or phan replaced, follow up with OP Urology    E coli UTI  UA on 2/19 with large leuk, pyuria, bacteriuria.  Started on empiric ciprofloxacin.  UC with >100k colonies E coli, susceptible to cipro.    - Continue cipro 500 mg BID, plan for 5-day course     Asthma  Patient endorsing mouth irritation w/ inhaler use. Per inpatient team, roof of mouth erythematous and appears excoriated but w/o evidence of yeast.   - Continue PTA inhalers (levalbuterol and spiriva - substitute by Incruse Ellipta)  - Magic mouthwash    Tobacco use disorder  - Continue nicotine patch 14 mcg, started during recent (December) admission  - PRN nicorette gum added 2/20 for breakthrough cravings     JYOTI  - Continue BiPAP at night  - Patient reports that she has not been using BiPAP due to issues with mask, instead using oxygen with sleep.  RT met with patient on 2/20, but patient declined all alternative mask choices and expressed preference to continue nasal cannula with sleep while here instead.    - RT met with patient again on 2/21.  Patient feels hospital mask too small despite largest size available.  Recommended to bring home equip to use. 02/22/23 ABG was drawn and reviewed; she tried BiPAP again per hospitalist team recs and encouragement.     Hyperlipidemia   - Continue PTA Lipitor 40 mg daily     Acute low back pain with bilateral radiculopathy, improving  Chronic Pain  [PTA meds: Tylenol PRN, diclofenac QID PRN, gabapentin 600 mg TID, oxycodone 5 mg 5x/day]  Of note, per PDMP #140 tabs of oxycodone 5 mg were filled on 2/16 while patient hospitalized.  Patient reporting diffuse joint pain while inpatient. Using narcotics along with gabapentin, topical nsaids, and muscle relaxants. Aggressive diuresis vs diabetic neuropathy.  Pain management curbside consulted 1/31 while inpatient;  "decrease gabapentin to BID, stopped flexeril and added robaxin.  Pain improved initially at ARU admission but significantly increased pain in bilateral low back radiating to bilateral buttocks and posterior legs to feet.  Decreased with ambulation, but increased with any movement in bed.  Lumbar/thoracic xrays with grossly maintained vertebral height, no evidence of fracture.  Right thigh and hip xray with no evidence of fracture, mild degenerative changes.  US RLE 2/19 negative for DVT. US of soft issue showed 1. No fluid collection identified to suggest abscess. 2. Mild subcutaneous edema.   02/22/23 localized posterior right thigh pain seems more MSK related and secondary to severe cramps / trigger point. Will continue warm pack, added bengay qid and PT will try TENS. No underlying medical / electrolyte abnormality that is contributing at this point. Deconditioning and disuse atrophy are likely contributing. Will also discuss with Dr. Velazquez to continue working on relaxation techniques and coping with chronic pain.   - Continue PRN Tylenol, voltaren (feet - not using), capsaicin (feet - not using), robaxin, and hydroxyzine. Continue stretching and modalities.  -Lidocaine patch. 02/23/23 discontinued as she has not been using  - Resumed PTA gabapentin 600 mg TID on 2/21  - Continue Topamax 25 mg daily. 02/24/23 was discontinued due to possible side effects (she reported hand tremors)  - Continue oxycodone 5 mg q6h PRN     Morbid obesity  Estimated body mass index is 70.79 kg/m  as calculated from the following:    Height as of 1/23/23: 1.702 m (5' 7\").    Weight as of this encounter: 205 kg (452 lb).  - Complicates rehab/cares    Possible COVID-19 exposure  Per ID, patient was previously on unit with high prevalence of COVID-19.  Tested negative on 2/15.  Infection prevention recommending repeat testing to confirm negative at 5-7 days.  Ordered on 2/16 by ID provider; patient declining despite education.  " Afebrile, no signs/symptoms of COVID infection.  COVID PCR negative on 2/20.        Adjustment to disability:  Clinical psychology evaluation on February 22 for Adjustment disorder with depressed mood; see consult note for more details  FEN: mod cons CHO, 2g NA, 2g K, 2L fluid restriction  Bowel: continent, monitor, PRN bowel meds available  Bladder: phan removed on 2/17 for TOV as above  DVT Prophylaxis: apixaban  GI Prophylaxis: not indicated  Code: full  Disposition: goal for home  ELOS:  10-14 days  Follow up Appointments on Discharge: PCP in 1-2 weeks, cardiology ([facility], scheduled 2/24), endocrinology (Thelma Archibald/Dr Silva Damon on 3/17), urology, ortho/podiatry (3/3 with Dr. Leahy)        Helen Harris MD  Physical Medicine & Rehabilitation

## 2023-02-24 NOTE — PROGRESS NOTES
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  She is now admitted to ARU on 2/14/23 for rehabilitation.     Assessment:   1)Type II Diabetes Mellitus, uncontrolled (A1c 13.5%), with insulin resistance.    Plan:    -Lantus increased from 60 to 65 units daily AM   -Victoza 0.6 mg daily AM resumed today> increase in 2-3 days if tolerating.   -Novolog 1:4g CHO with breakfast/lunch, and 1:5g CHO with dinner/snacks   -patient care order: \"pt may have 0200 snack without carb coverage, to limit low BG risk overnight\"   -Novolog custom 1/20 intensity sliding scale TID AC and HS   -BG monitoring TID AC, HS, 0200                 -hypoglycemia protocol                 -carb counting protocol                 -diabetes education needs will be assessed closer to discharge.     Tentative Plan for Discharge:    -stop Jardiance, due to UTI history.   -resume PTA Tuojeo: ** units daily AM (substituted Lantus here)   -resume Ozempic 1 mg once weekly (substituted Victoza here)   -Novolog ** units with a meal, ** units with a snack/breakfast shake   -Novolog sliding scale AC/HS    Outpatient follow up: with [facility] - has appt 3/17/23    Interval History and Assessment: interval glucose trend reviewed:   BG stable, but largely remaining above target.     Has snacks at bedside, not always getting covered. And some postprandial checks.     She is eating a bag of chips upon " "evaluation today, has bag of candy next to her. She typically only consumes a Premier Protein shake for breakfast at home- she would like to mimic home food intake in preparation for discharge, to make sure her insulin dosing matches.    Cr improving. Not adhering to overnight BiPAP.    Current nutritional intake and type: Orders Placed This Encounter      Combination Diet 2 gm K Diet; Moderate Consistent Carb (60 g CHO per Meal) Diet; 2 gm NA Diet      PTA Diabetes Regimen:   Tuojeo 40 unit(s) once daily at HS  Aspart 10 units with each meal + additional correction scale insulin as follows based on pre-meal glucose      -140 - 160: + 1 unit     -161 - 180: + 2 units     -181 - 200: + 3 units, and so on (sensitivity of 20 mg/dL)     Ozempic 1 mg weekly - she reports she did NOT resume as instructed     BG monitoring frequency:  3-4 times per day  BG trends at home: \"all over\" but mostly between 85 - 300      Historical:   Tuojeo (glargine U300) 225 units once daily  Novolog 100  Units with meals, plus 1/15 correction scale  Trulicity 1mg weekly on Mondays  Jardiance 25 mg daily AM - stopped 2/2 UTI's   (has not tolerated Metformin in the past)    Discharge Planning: per primary team notes, ~3/3/23.           Diabetes History:   Type of Diabetes: Type 2 Diabetes Mellitus  Lab Results   Component Value Date    A1C 13.5 12/25/2022    A1C >15.0 07/23/2021    A1C >14.0 08/19/2020    A1C 8.4 10/11/2018    A1C 9.6 05/29/2018    A1C 9.0 04/07/2017    A1C 12.1 03/10/2016              Review of Systems:     The Review of Systems is negative other than noted in the Interval History.           Medications:     Current Facility-Administered Medications   Medication     acetaminophen (TYLENOL) tablet 650 mg     albuterol (PROVENTIL HFA/VENTOLIN HFA) inhaler     albuterol (PROVENTIL) neb solution 2.5 mg     apixaban ANTICOAGULANT (ELIQUIS) tablet 5 mg     atorvastatin (LIPITOR) tablet 40 mg     benzocaine-menthol (CHLORASEPTIC) " 6-10 MG lozenge 1 lozenge     bumetanide (BUMEX) tablet 1 mg     capsaicin (ZOSTRIX) 0.025 % cream 1 applicator     ciprofloxacin (CIPRO) tablet 500 mg     clotrimazole (LOTRIMIN) 1 % cream     glucose gel 15-30 g    Or     dextrose 50 % injection 25-50 mL    Or     glucagon injection 1 mg     diclofenac (VOLTAREN) 1 % topical gel 2 g     diltiazem ER (CARDIZEM SR) 12 hr capsule 120 mg     docusate sodium (COLACE) capsule 100 mg     docusate sodium (COLACE) capsule 50 mg     enema compound (docusate/mineral oil/NaPhos) NO MAG CITRATE MIXTURE     gabapentin (NEURONTIN) capsule 600 mg     hydrALAZINE (APRESOLINE) tablet 10 mg     hydrALAZINE (APRESOLINE) tablet 10 mg     hydrOXYzine (ATARAX) tablet 25 mg    Or     hydrOXYzine (ATARAX) tablet 50 mg     insulin aspart (NovoLOG) injection (RAPID ACTING)     insulin aspart (NovoLOG) injection (RAPID ACTING)     insulin aspart (NovoLOG) injection (RAPID ACTING)     insulin aspart (NovoLOG) injection (RAPID ACTING)     insulin aspart (NovoLOG) injection (RAPID ACTING)     insulin aspart (NovoLOG) injection (RAPID ACTING)     insulin glargine (LANTUS PEN) injection 65 Units     levothyroxine (SYNTHROID/LEVOTHROID) tablet 200 mcg     levothyroxine (SYNTHROID/LEVOTHROID) tablet 400 mcg     liraglutide (VICTOZA) injection 0.6 mg     menthol (Topical Analgesic) 2.5% (BENGAY VANISHING SCENT) 2.5 % topical gel     methocarbamol (ROBAXIN) tablet 500 mg     metoprolol succinate ER (TOPROL XL) 24 hr tablet 50 mg     miconazole (MICATIN) 2 % powder     mineral oil-hydrophilic petrolatum (AQUAPHOR)     mineral oil-hydrophilic petrolatum (AQUAPHOR)     naloxone (NARCAN) injection 0.2 mg    Or     naloxone (NARCAN) injection 0.4 mg    Or     naloxone (NARCAN) injection 0.2 mg    Or     naloxone (NARCAN) injection 0.4 mg     nicotine (NICODERM CQ) 14 MG/24HR 24 hr patch 1 patch    And     nicotine Patch in Place     nicotine (NICORETTE) gum 2 mg     oxyCODONE (ROXICODONE) tablet 5 mg      Patient is already receiving anticoagulation with heparin, enoxaparin (LOVENOX), warfarin (COUMADIN)  or other anticoagulant medication     sennosides (SENOKOT) tablet 8.6 mg     simethicone (MYLICON) chewable tablet 80 mg     umeclidinium (INCRUSE ELLIPTA) 62.5 MCG/ACT inhaler 1 puff     Facility-Administered Medications Ordered in Other Encounters   Medication     sodium chloride (PF) 0.9% PF flush 10 mL            Physical Exam:    /78 (BP Location: Right arm, Patient Position: Chair, Cuff Size: Adult Regular)   Pulse 91   Temp 97.6  F (36.4  C) (Axillary)   Resp 18   Wt (!) 205 kg (452 lb)   SpO2 99%   BMI 70.79 kg/m      GENERAL : In no apparent distress, sitting at edge of bed eating chips  SKIN: Normal color, no  suspicious lesions or rashes  EYES: No proptosis.  MOUTH: Moist, pink  NECK: No visible masses/goiter  RESP: normal respiratory effort. No cough  NEURO: awake, alert, responds appropriately to questions.            Data:     Recent Labs   Lab 02/25/23  0746 02/25/23  0209 02/24/23  2121 02/24/23  1725 02/24/23  1208 02/24/23  0824   * 191* 217* 189* 265* 320*     Lab Results   Component Value Date    WBC 10.4 02/23/2023    WBC 8.1 02/21/2023    WBC 8.1 02/20/2023    HGB 12.3 02/23/2023    HGB 12.1 02/21/2023    HGB 12.3 02/20/2023    HCT 40.1 02/23/2023    HCT 41.3 02/21/2023    HCT 41.4 02/20/2023    MCV 95 02/23/2023    MCV 98 02/21/2023    MCV 97 02/20/2023     02/23/2023     02/21/2023     02/20/2023     Lab Results   Component Value Date     02/23/2023     (L) 02/22/2023     02/21/2023    POTASSIUM 4.3 02/23/2023    POTASSIUM 4.3 02/22/2023    POTASSIUM 4.7 02/21/2023    CHLORIDE 100 02/23/2023    CHLORIDE 99 02/22/2023    CHLORIDE 102 02/21/2023    CO2 29 02/23/2023    CO2 24 02/22/2023    CO2 27 02/21/2023     (H) 02/25/2023     (H) 02/25/2023     (H) 02/24/2023     Lab Results   Component Value Date    BUN 47.5 (H)  02/23/2023    BUN 52.4 (H) 02/22/2023    BUN 54.8 (H) 02/21/2023     Lab Results   Component Value Date    TSH 20.40 (H) 01/23/2023    TSH 14.30 (H) 12/25/2022    TSH 6.79 (H) 09/09/2021     Lab Results   Component Value Date    AST 24 02/22/2023    AST 30 02/19/2023    AST 37 (H) 01/29/2023    ALT 17 02/22/2023    ALT 18 02/19/2023    ALT 11 01/29/2023    ALKPHOS 122 (H) 02/22/2023    ALKPHOS 122 (H) 02/19/2023    ALKPHOS 97 01/29/2023       50 minutes spent on the date of the encounter doing chart review, history and exam, documentation and further activities per the note        Contacting the Inpatient Diabetes Team   From 8AM-4PM: page inpatient diabetes provider that is following the patient, or utilize the job code paging system.   From 4PM-8AM: page the job code for endocrine fellow on call.       Please use the following job code to reach the Inpatient Diabetes team. Note that you must use an in house phone and that job codes cannot receive text pages.     Dial [phone] (or star-star-star [phone] on the new [employer] telephones), then [phone] to reach the endocrine-diabetes provider on call.    YOCASTA Law, CNP  Inpatient Diabetes Management Service  Pager [phone]

## 2023-02-24 NOTE — PROGRESS NOTES
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  She is now admitted to ARU on 2/14/23 for rehabilitation.     Assessment:   1)Type II Diabetes Mellitus, uncontrolled (A1c 13.5%), with insulin resistance.    Plan:    -increase Lantus to 60 units from 55 units once daily AM--->may need to reduce in coming days pending response to victoza   -Novolog 1:4g CHO with breakfast/lunch, and 1:5g CHO with dinner/snacks   -patient care order: \"pt may have 0200 snack without carb coverage, to limit low BG risk overnight\"   -Novolog custom 2/30 intensity sliding scale TID AC and HS   -resume Vicoza 0.6 mg daily tomorrow AM   -BG monitoring TID AC, HS, 0200                 -Jardiance was stopped due to UTIs                 -hypoglycemia protocol                 -carb counting protocol                 -diabetes education needs will be assessed closer to discharge.     Outpatient follow up: with [facility] - has appt 3/17/23    Interval History and Assessment: interval glucose trend reviewed:   BG above targets- patient is afraid of hypoglycemia in the night, so has a snack overnight.  She also admits to some candy from her bedside table.   this AM and patient cannot remember if BG was checked before/during/after the Bfast.  She tells me her BG was checked after she started eating her lunch today, BG was 265.  She is eating well.  Had been dealing with some " "constipation, but received miralax and stools are now more soft.    Consistently consuming >100g CHO with most meals.  Also not sure if the below snacks between meals are being consistently covered      + UTI. Cr 1.45>1.28> 1.16.        Current nutritional intake and type: Orders Placed This Encounter      Combination Diet 2 gm K Diet; Moderate Consistent Carb (60 g CHO per Meal) Diet; 2 gm NA Diet      PTA Diabetes Regimen:   Tuojeo 40 unit(s) once daily at HS  Aspart 10 units with each meal + additional correction scale insulin as follows based on pre-meal glucose      -140 - 160: + 1 unit     -161 - 180: + 2 units     -181 - 200: + 3 units, and so on (sensitivity of 20 mg/dL)     Ozempic 1 mg weekly - she reports she did NOT resume as instructed     BG monitoring frequency:  3-4 times per day  BG trends at home: \"all over\" but mostly between 85 - 300      Historical:   Tuojeo (glargine U300) 225 units once daily  Novolog 100  Units with meals, plus 1/15 correction scale  Trulicity 1mg weekly on Mondays  Jardiance 25 mg daily AM - stopped 2/2 UTI's   (has not tolerated Metformin in the past)    Discharge Planning: per primary team notes, ~3/3/23.           Diabetes History:   Type of Diabetes: Type 2 Diabetes Mellitus  Lab Results   Component Value Date    A1C 13.5 12/25/2022    A1C >15.0 07/23/2021    A1C >14.0 08/19/2020    A1C 8.4 10/11/2018    A1C 9.6 05/29/2018    A1C 9.0 04/07/2017    A1C 12.1 03/10/2016              Review of Systems:     The Review of Systems is negative other than noted in the Interval History.           Medications:     Current Facility-Administered Medications   Medication     acetaminophen (TYLENOL) tablet 650 mg     albuterol (PROVENTIL HFA/VENTOLIN HFA) inhaler     albuterol (PROVENTIL) neb solution 2.5 mg     apixaban ANTICOAGULANT (ELIQUIS) tablet 5 mg     atorvastatin (LIPITOR) tablet 40 mg     benzocaine-menthol (CHLORASEPTIC) 6-10 MG lozenge 1 lozenge     bumetanide (BUMEX) " tablet 1 mg     capsaicin (ZOSTRIX) 0.025 % cream 1 applicator     ciprofloxacin (CIPRO) tablet 500 mg     clotrimazole (LOTRIMIN) 1 % cream     glucose gel 15-30 g    Or     dextrose 50 % injection 25-50 mL    Or     glucagon injection 1 mg     diclofenac (VOLTAREN) 1 % topical gel 2 g     diltiazem ER (CARDIZEM SR) 12 hr capsule 120 mg     docusate sodium (COLACE) capsule 100 mg     docusate sodium (COLACE) capsule 50 mg     enema compound (docusate/mineral oil/NaPhos) NO MAG CITRATE MIXTURE     gabapentin (NEURONTIN) capsule 600 mg     hydrALAZINE (APRESOLINE) tablet 10 mg     hydrALAZINE (APRESOLINE) tablet 10 mg     hydrOXYzine (ATARAX) tablet 25 mg    Or     hydrOXYzine (ATARAX) tablet 50 mg     insulin aspart (NovoLOG) injection (RAPID ACTING)     insulin aspart (NovoLOG) injection (RAPID ACTING)     insulin aspart (NovoLOG) injection (RAPID ACTING)     insulin aspart (NovoLOG) injection (RAPID ACTING)     insulin aspart (NovoLOG) injection (RAPID ACTING)     insulin aspart (NovoLOG) injection (RAPID ACTING)     insulin glargine (LANTUS PEN) injection 60 Units     levothyroxine (SYNTHROID/LEVOTHROID) tablet 200 mcg     levothyroxine (SYNTHROID/LEVOTHROID) tablet 400 mcg     menthol (Topical Analgesic) 2.5% (BENGAY VANISHING SCENT) 2.5 % topical gel     methocarbamol (ROBAXIN) tablet 500 mg     metoprolol succinate ER (TOPROL XL) 24 hr tablet 50 mg     miconazole (MICATIN) 2 % powder     mineral oil-hydrophilic petrolatum (AQUAPHOR)     mineral oil-hydrophilic petrolatum (AQUAPHOR)     naloxone (NARCAN) injection 0.2 mg    Or     naloxone (NARCAN) injection 0.4 mg    Or     naloxone (NARCAN) injection 0.2 mg    Or     naloxone (NARCAN) injection 0.4 mg     nicotine (NICODERM CQ) 14 MG/24HR 24 hr patch 1 patch    And     nicotine Patch in Place     nicotine (NICORETTE) gum 2 mg     oxyCODONE (ROXICODONE) tablet 5 mg     Patient is already receiving anticoagulation with heparin, enoxaparin (LOVENOX), warfarin  (COUMADIN)  or other anticoagulant medication     sennosides (SENOKOT) tablet 8.6 mg     simethicone (MYLICON) chewable tablet 80 mg     topiramate (TOPAMAX) tablet 25 mg     umeclidinium (INCRUSE ELLIPTA) 62.5 MCG/ACT inhaler 1 puff     Facility-Administered Medications Ordered in Other Encounters   Medication     sodium chloride (PF) 0.9% PF flush 10 mL            Physical Exam:    /80 (BP Location: Right arm, Patient Position: Semi-[place]'s)   Pulse 99   Temp 97.2  F (36.2  C)   Resp 18   Wt (!) 205 kg (452 lb)   SpO2 98%   BMI 70.79 kg/m    General: in no acute distress, cheerful.  HEENT: hearing intact to conversational volume  Lungs: mildly labored/snoring respiration, no cough  Mental status: alert and oriented X 3.  Psych: calm            Data:     Recent Labs   Lab 02/24/23  0219 02/23/23  2253 02/23/23  1557 02/23/23  1155 02/23/23  1140 02/23/23  0830   * 206* 220* 259* 255* 181*     Lab Results   Component Value Date    WBC 10.4 02/23/2023    WBC 8.1 02/21/2023    WBC 8.1 02/20/2023    HGB 12.3 02/23/2023    HGB 12.1 02/21/2023    HGB 12.3 02/20/2023    HCT 40.1 02/23/2023    HCT 41.3 02/21/2023    HCT 41.4 02/20/2023    MCV 95 02/23/2023    MCV 98 02/21/2023    MCV 97 02/20/2023     02/23/2023     02/21/2023     02/20/2023     Lab Results   Component Value Date     02/23/2023     (L) 02/22/2023     02/21/2023    POTASSIUM 4.3 02/23/2023    POTASSIUM 4.3 02/22/2023    POTASSIUM 4.7 02/21/2023    CHLORIDE 100 02/23/2023    CHLORIDE 99 02/22/2023    CHLORIDE 102 02/21/2023    CO2 29 02/23/2023    CO2 24 02/22/2023    CO2 27 02/21/2023     (H) 02/24/2023     (H) 02/23/2023     (H) 02/23/2023     Lab Results   Component Value Date    BUN 47.5 (H) 02/23/2023    BUN 52.4 (H) 02/22/2023    BUN 54.8 (H) 02/21/2023     Lab Results   Component Value Date    TSH 20.40 (H) 01/23/2023    TSH 14.30 (H) 12/25/2022    TSH 6.79 (H) 09/09/2021      Lab Results   Component Value Date    AST 24 02/22/2023    AST 30 02/19/2023    AST 37 (H) 01/29/2023    ALT 17 02/22/2023    ALT 18 02/19/2023    ALT 11 01/29/2023    ALKPHOS 122 (H) 02/22/2023    ALKPHOS 122 (H) 02/19/2023    ALKPHOS 97 01/29/2023       Follow up was done today via virtual/ telephone encounter.  6 minutes spent on phone with patient  30 minutes spent on documentation, coordination of care      Contacting the Inpatient Diabetes Team   From 8AM-4PM: page inpatient diabetes provider that is following the patient, or utilize the job code paging system.   From 4PM-8AM: page the job code for endocrine fellow on call.       Please use the following job code to reach the Inpatient Diabetes team. Note that you must use an in house phone and that job codes cannot receive text pages.     Dial [phone] (or star-star-star [phone] on the new [employer] telephones), then [phone] to reach the endocrine-diabetes provider on call.    YOCASTA Law, CNP  Inpatient Diabetes Management Service  Pager [phone]

## 2023-02-24 NOTE — PLAN OF CARE
Goal Outcome Evaluation:    Orientation: A/O x4  Ambulation/Transfers: A1 using ww  Bowel/Bladder: Mixed urinary continence, LBM 2/24  Pain: Ongoing generalized aches, prn x1  Diet/Liquids/Pills: 2gram NA/K diet, 2000mL FR  Tubes/Lines/Drains/Skin: redness to breast, axilla, abdomen folds

## 2023-02-24 NOTE — PLAN OF CARE
FOCUS/GOAL  Medical management    ASSESSMENT, INTERVENTIONS AND CONTINUING PLAN FOR GOAL:  Fair night of sleep. Bipap worn  the entire shift. Denied pain. Void using bed pan. No M this shift.  at 0200 AM. Will continue with POC.  Goal Outcome Evaluation:      Plan of Care Reviewed With: other (see comments)    Overall Patient Progress: no changeOverall Patient Progress: no change    Outcome Evaluation: No change this shift.

## 2023-02-24 NOTE — PLAN OF CARE
Goal Outcome Evaluation:      Plan of Care Reviewed With: patient      Outcome Evaluation: Vitals stable. BGs still elevated per trend the past few days. Pt admitted to eating some candy overnight because she was hungry that resulted in having an elevated BG of 320 before breakfast. Encouraged to eat the sugar free candy at her bedside and ask for an early am snack from nurse like peanut butter and milk instead of eating the candies at her bedside table. Scheduled morning lantus increased to 60 units from 55units yesterday and tomorrow pt will restart victoza. Carb count novolog given with correction novolog at meal time. Right foot big toe and middle toe with nails present. Wound cares done and mepilex applied. LE swelling noted. Skin cares to LE done and lymphedema wraps applied. Pt continent of bladder using bedpan this morning. Assist of 1 with transfers using a walker. Pt able to use call light appropriately. Prn yylenol and prn oxycodone for posterior thigh painin addition to scheduled bengay and occasional stertching of leg with good effect.

## 2023-02-25 NOTE — PLAN OF CARE
Discharge Planner Post-Acute Rehab PT:      Discharge Plan: Home (apt) 4 steps  with bilat rails + 1 platform, with resumed PCA services, home PT     Precautions: falls     Current Status:  Bed Mobility: SBA rolling L/R. Min A supine<>sit  Transfer: SBA with FWW  Gait:  CGA for 22' with fww.  Noted RLE weakness and heavy lateral weight-shift limiting stability   Stairs: 4 inch step up in bars - lead with LLE, down with RLE, cga.   Balance: able with bilat UE support on walker     Assessment:  Pt may discharge to sisters home following ARU due to sister not having stairs.  Noted R hamstring pain, but did not limit PM gait with fww.  Increased Lateral weight-shift onto R due to weakness/pain during gait limited stability.     Other Barriers to Discharge (DME, Family Training, etc):   Stairs to enter home- pt states she could stay at sisters w/o stairs if needed. 2/24 - pt stating she has more than one option for alternate dispo where there are no stairs to enter   Pt's SO to measure step height, bed height  Pt has 4WW

## 2023-02-25 NOTE — PLAN OF CARE
FOCUS/GOAL  Medical management    ASSESSMENT, INTERVENTIONS AND CONTINUING PLAN FOR GOAL:  Selpt well. Removed Bipap several timers  during night, redirected with success.  at 0200. Denied pain. No BM reported this shift. Voided once this shift.  Will continue with POC.  Goal Outcome Evaluation:      Plan of Care Reviewed With: other (see comments)    Overall Patient Progress: no changeOverall Patient Progress: no change    Outcome Evaluation: No change this shift.

## 2023-02-25 NOTE — PLAN OF CARE
"Goal Outcome Evaluation:  FOCUS/GOAL  Medical management, Mobility, and Skin integrity    ASSESSMENT, INTERVENTIONS AND CONTINUING PLAN FOR GOAL:  Patient is alert/oriented x4, is able to use call light appropriately for care needs.  Patient denies any pain today, was somewhat tearful this morning after OT as the transfer they did reminded her of a fall she had a at home, patient was able to still do the transfer but per patient was from the \"hard commode,\" and the right leg \"goes numb if sitting too long on that surface.\"  Patient educated about BG checks as was snacking on popcorn when asking for a BG check close to lunchtime, encouraged to get BG checked before starting on any snacks or meal.  BG in am was 229 before breakfast, 177 before lunch. Skin underneath breasts is intact, powder applied.  Skin underneath abdomen and in the groin folds is moist, cleaned/dried and applied antifungal powder there as well. Skin is very intact but may benefit from interdry in those areas as well.                         "

## 2023-02-25 NOTE — PROGRESS NOTES
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 She is admitted to ARU on 2/14/23.        #Acute on Chronic Systolic Heart Failure (EF 40-45%)  #Long-Standing Persistent Atrial Fibrllation  #Hypertension   #Troponin Elevation 2/2 CHF, resolved   Pt admitted with c/o increasing SOB, MURRAY, chest tightness, weight gain in spite of compliance with PO diuretics. NT BNP 5891 (was 2030 during most recent admission).  Lactic acid 2.2 on admission with improvement to 1.1 with diuresis. Troponin mildly elevated, 67-->75-->60 likely 2/2 CHF exacerbation; no concern for ACS currently. Most recent Echo with EF 40-45%. Coronary angiogram 2012 with no significant CAD. Of note, pt recently admitted 12/25-1/1 HF exacerbation, diuresed to 472lbs. Pt weight on admission 1/23 555lbs. Noted weight gain.  Diuresing well when looking at intake and output.  - Volume Status: Euvolemic  - Diuresis: PO Bumex 1 mg daily increased to twice daily after consistently needing extra dose daily.  Continue with this dosing at discharge if kidney function remains at baseline.   -hx of using lymphadema wraps, consider Lymphadema therapy consult   - Beta Blocker: Continue Toprol XL to 50mg  - ACEi/ARB/ARNI: contraindicated d/t lisinopril allergy.   - SGLT2i: Contraindicated with recurrent yeast infections   - Aldosterone Antagonist: Hold/discontinue PTA Spironolactone 50mg d/t hyperkalemia  - Rate Control: BB as above. Cont diltiazem 120 BID due to persistent tachycardia and borderline reduced EF.   - Afterload reduction: Cont hydralazine 10mg PO TID (hold parameters SBP <110), can give additional prn if sbp >180  - Anticoagulation: CHADSVASC 3; Continue PTA apixaban  - Strict  I/O  -Daily Weights-fluctuate greatly due to inaccuracy of bed weights and patient unable to fit on standing scale.  Appears to have stabilized over the last couple of days  - Low Na Diet / 2L Fluid Restriction  - [facility] M, Th   - [facility] clinic to continue to follow   -We will need to follow-up with cardiology at discharge     #JYOTI  #Asthma  Patient had not been using BiPAP due to issues with mask, and instead was using oxygen with sleep.  RT continuously meeting with patient to try different masks.  Patient declining mass choices and using nasal cannula with sleep.  Noted to be very lethargic 2/22 and strongly encouraged patient to use BiPAP.  ABGs checked-pH 7.39, PCO2 arterial 50, PCO2 arterial 65, bicarb 30, base excess arterial 4.0.  Patient has been now using BiPAP at bedtime and with naps.  She is much more alert and oriented today and verbalizes that she has more energy throughout the day.  -Incruse Ellipta (substituted for Spiriva) 1 puff inhalation daily  -Continue to encourage use of BiPAP at bedtime and with naps  -RT following, appreciate recs  -Albuterol inhaler as needed     #Type 2 Diabetes Mellitus, uncontrolled (A1c 13.5%) with insulin resistance  Was briefly on insulin drip while inpatient. Has tried Metformin in the past and was incontinent of stool the diarrhea was so severe.   Endocrinology following- recs per 2/20/23 note:    -lantus 55 increased to 60 and again to 65 units daily (may change back to Tuojeo on discharge)               -Novolog increased to uniformly 1 per 4 grams with breakfast and lunch.  Continue 1:5 with dinner and snacks.                 -Patient care order: Patient may have 0200 snack without carb coverage, to limit low blood glucose risk overnight                 -Continue Novolog custom 1/20 sliding scale TID AC and HS                  -BG monitoring TID AC, HS, 0200                 -holding PTA Ozempic, due to NIRAJ                 -started Victoza 2/25 (resume Ozempic at  "discharge)                 -(Jardiance was stopped due to UTIs)                 -hypoglycemia protocol                 -carb counting protocol                 -diabetes education needs will be assessed closer to discharge.   -Outpatient follow-up with [facility] endocrinology, Dr. Ambrocio on 3/17/2023  Recent Labs   Lab 02/26/23  0800 02/26/23  0156 02/25/23  2130 02/25/23  1718 02/25/23  1146 02/25/23  0746   * 195* 178* 216* 173* 229*        Right posterior thigh pain-improving  Patient continues to complain with mid-right thigh pain \"jelena horse\" that is constant.    Patient states that the pain will start in the thigh and radiate up to the back.  Back pain is not constant.  PT notes very likely is hamstrings.  No e/o cellulitis or abscess on exam. XR right thigh and hip: no e/o fracture and mild degenerative changes. US right LE no DVT and no clear e/o abscess or cellulitis  -topical icyhot  -Continue therapies  -Acetaminophen, capsaicin cream, diclofenac gel, Robaxin, oxycodone as needed for pain     Right hand unilateral tremor-resolved  Patient states she started noticing a tremor in her right hand this morning.  She states it was so bad that she was spilling her breakfast.  She states that the tremor has decreased in severity throughout the day.  Discussed stress, anxiety, changes in medications.  Patient believes this is a side effect of Topamax which she was started on for weight loss.  -Discontinue Topamax     Acute on chronic Kidney Disease Stage 3b  Hyperkalemia, resolved  Longstanding history of diabetes and HTN. Baseline Creatinine appears to be around 1.2-1.4. Elevated until recently prior to discharge in the setting of aggressive diuresis. Creatinine on ARU admission 1.4. K up to 7.3 (2/9) w/o EKG changes. Nephrology consulted inpatient. Cr stable at ARU 1.2-1.5.   - BMP M, Th  - Hold spironolactone (high K)  - bumex 1mg twice daily  - Avoid nephrotoxic agents     Right toe wound, " necrotic toes  Present on admission, right great toe and third toe, dry gangrene, patient on or how long wounds have been present.  Vascular, podiatry, and orthopedics consulted while inpatient.  ABIs with normal waveforms.  -WOCN: Wound cares to right toe daily.  Betadine to be applied to toes and be sure to apply over dry eschar.  Okay to leave open to air.  No soaking feet  -Ortho follow-up to discuss elective amputation on 3/3 with Dr. Leahy  -Elevate lower extremities, weight-bear as tolerated     Graves Disease s/p Radioiodine Ablation  Hypothyroidism 2/2 Radioiodine Ablation  TSH 20.4 and free T4 0.82 on admission. Endocrine followed during inpatient admission and updated levothyroxine dosing.   - Continue levothyroxine 200 mcg M-Sat- 400 mcg on Sunday.    - Recheck thyroid function tests in a month (March 2023)     Stable and resolved:  Urinary retention, resolved-Keenan removed 2/17  Acute simple cystitis, resolved- completed 5 days of Macrobid   Hyperlipidemia-continue Lipitor 40 mg daily  Asthma, mild intermittent-continue PTA inhalers  Chronic upper and lower extremity joint pain-as needed lidocaine patches, encourage increased mobility        Dania Humphries, CNP, APRN  Internal Medicine [facility] Hospitalist  [facility]  Pager [phone]            Interval History:   Chief complaint of pain.  Continues to use BiPAP at night.  No longer feels lethargic during the day.  Voiding adequate amounts.  Weight stabilized.  Denies other needs or concerns for medicine at this time         Physical Exam:   Blood pressure 118/80, pulse 95, temperature (!) 96.7  F (35.9  C), temperature source Oral, resp. rate 16, weight (!) 219.1 kg (483 lb 1.6 oz), SpO2 99 %, not currently breastfeeding.    GENERAL: Alert and oriented x 3. Well nourished, well developed.  No acute distress.    HEENT: Normocephalic, atraumatic. Anicteric sclera. Mucous membranes moist.   CV: RRR. S1, S2. No murmurs appreciated.   RESPIRATORY: Effort  normal on RA. Lungs CTAB with no wheezing, rales, or rhonchi.   GI: Abdomen soft and non distended, bowel sounds present x all 4 quadrants. No tenderness, rebound, or guarding.   NEUROLOGICAL: No focal deficits. Follows commands.  Strength equal in upper and lower extremities.   MUSCULOSKELETAL: No joint swelling or tenderness. Moves all extremities.   EXTREMITIES: No gross deformities. 2+ pitting b/l LE edema    SKIN: Grossly warm, dry, and intact. No jaundice. No rashes.       ROUTINE IP LABS (Last four results)  CMP   Recent Labs   Lab 02/25/23  1146 02/25/23  0746 02/25/23  0209 02/24/23  2121 02/23/23  1557 02/23/23  1155 02/22/23  1742 02/22/23  1631 02/21/23  0753 02/21/23  0643 02/20/23  2106 02/20/23  1919 02/19/23  0738 02/19/23  0649   NA  --   --   --   --   --  137  --  135*  --  137  --  137  --  137   POTASSIUM  --   --   --   --   --  4.3  --  4.3  --  4.7  --  4.5  --  4.7   CHLORIDE  --   --   --   --   --  100  --  99  --  102  --  99  --  100   CO2  --   --   --   --   --  29  --  24  --  27  --  27  --  28   ANIONGAP  --   --   --   --   --  8  --  12  --  8  --  11  --  9   * 229* 191* 217*   < > 259*   < > 126*   < > 209*   < > 211*   < > 192*   BUN  --   --   --   --   --  47.5*  --  52.4*  --  54.8*  --  55.9*  --  57.0*   CR  --   --   --   --   --  1.16*  --  1.28*  --  1.45*  --  1.44*  --  1.49*   JUSTIN  --   --   --   --   --  9.2  --  9.6  --  9.2  --  9.2  --  9.1   MAG  --   --   --   --   --  1.8  --   --   --   --   --  1.7  --  1.9   PHOS  --   --   --   --   --  3.5  --   --   --   --   --  3.2  --   --    PROTTOTAL  --   --   --   --   --   --   --  8.4*  --   --   --   --   --  7.6   ALBUMIN  --   --   --   --   --   --   --  3.1*  --   --   --   --   --  2.9*   BILITOTAL  --   --   --   --   --   --   --  0.8  --   --   --   --   --  0.6   ALKPHOS  --   --   --   --   --   --   --  122*  --   --   --   --   --  122*   AST  --   --   --   --   --   --   --  24  --   --    --   --   --  30   ALT  --   --   --   --   --   --   --  17  --   --   --   --   --  18    < > = values in this interval not displayed.     CBC   Recent Labs   Lab 02/23/23  1155 02/21/23  0643 02/20/23  1920 02/19/23  1024   WBC 10.4 8.1 8.1 6.4   RBC 4.21 4.20 4.27 4.45   HGB 12.3 12.1 12.3 13.0   HCT 40.1 41.3 41.4 43.5   MCV 95 98 97 98   MCH 29.2 28.8 28.8 29.2   MCHC 30.7* 29.3* 29.7* 29.9*   RDW 16.3* 16.4* 16.6* 16.5*    251 256 183     INR No lab results found in last 7 days.    OTHER:

## 2023-02-25 NOTE — PLAN OF CARE
Discharge Planner Post-Acute Rehab OT:     Discharge Plan: home with pca 10 hrs during the day and 4 at night    Precautions: falls, bariatric equipment required    Current Status:  ADLs:    Mobility: SBA asha FWW in-room distances.    Grooming: IND seated w/ set up.    Dressing:  UB:ot pt able todon pull over shirt after clothes retrieve for her. LB- with extra time while sitting on commode over toilet with use of reacher able to get both legs into shorts and up legs max a to adjust over hips.standing at fww.     Feet- socks pt able to don after set up of sock aide no instruction needed    Bathing: pt able towash 8/10 areas assist with feet and buttocks. dep liko to rollling shower chair    Toileting: Transfer SBA FWW <> asha commode overlay on toilet. Hygiene mod i sitting with priyanka cares with toilet aide while sitting.  IADLs:tba  Vision/Cognition: Anxious. assess higher cog     Assessment: Pt up to toilet and bathroom sink (sitting on commode overlay). Pt then had increasing RLE numbness in which she required Ax 2 on 2 attempts up to sink as pt not able to mobilize RLE due numbness. Pt was tearful and crying. Able to problem w/ use of w/c w/ switching out overlay commode and pt was able to get back to room.  RLE numbness continued to subside w/RLE propped up on pillow once up in w/c. RLE still limiting variably but pt remains motivated to get home. Pt was feeling better by the end of session and hand off was given to nsg.    Other Barriers to Discharge (DME, Family Training, etc):  Will likely need:   -bariatric commode overlay for toilet during daytime and to use as BSC for nighttime PRN  -bariatric extended tub bench-toilet aide   -wide sock aide  -reacher    pt has in room sock aide dressing stick , reacher and toilet hygiene aide, leg

## 2023-02-25 NOTE — PLAN OF CARE
Goal Outcome Evaluation:    Orientation: A/O x4  Ambulation/Transfers: A1 using ww  Bowel/Bladder: Mixed urinary continence, LBM 2/24  Pain: Ongoing generalized aches  Diet/Liquids/Pills: 2gram NA/K diet, 2000mL FR  Tubes/Lines/Drains/Skin: redness to breast, axilla, abdomen folds

## 2023-02-26 NOTE — PLAN OF CARE
Goal Outcome Evaluation:    Orientation: A/O x4  Ambulation/Transfers: Set-up using ww  Bowel/Bladder: Mixed urinary continence, LBM 2/23  Pain: Ongoing generalized aches  Diet/Liquids/Pills: 2gram NA/K diet, 2000mL FR  Tubes/Lines/Drains/Skin: redness to breast, axilla, abdomen folds

## 2023-02-26 NOTE — PROGRESS NOTES
[facility]   Acute Rehabilitation Unit  Daily progress note    INTERVAL HISTORY  Pricilla Brown was seen and examined at bedside this morning.  She was doing well. Reported h/o paresthesia at L 3-5 digits, some weakness in FF and pain at L writs/fingers/medial forearm for a while. Symptoms were worse that day as she tried to catch herself in the bathroom yesterday. Exam suggestive of ulnar neuropathy at the elbow; talked to PT and nursing about elbow support when sitting in chair. Ordered L elbow sleeve with padding to be used during the day.     She asked the question about discharge again today. Reviewed the info and the plan. Stairs were difficult prior to admission and she understands the risk of not being able to get out of her house even if she can go down stairs to get in. She is ok with going to her sister's house with no stairs but really wants to go home. Also noted that she has been in and out of hospital multiple times over the past 2-3 months and doesn't know what's going on at her house. PCA support is available but she has not been able to even attempt here at ARU. Final plan is to keep working with PT till Tuesday and review the discharge plan again at that point. It seems like she may need to eventually go to her sister's and think about transitioning to a place with no stairs.           MEDICATIONS    apixaban ANTICOAGULANT  5 mg Oral BID     atorvastatin  40 mg Oral Daily     bumetanide  1 mg Oral BID     diltiazem ER  120 mg Oral BID     docusate sodium  50 mg Oral BID     gabapentin  600 mg Oral TID     hydrALAZINE  10 mg Oral TID     insulin aspart  1-14 Units Subcutaneous TID AC     insulin aspart  1-11 Units Subcutaneous At Bedtime     insulin aspart   Subcutaneous Daily with breakfast     insulin aspart   Subcutaneous Daily with lunch     insulin aspart   Subcutaneous Daily with supper     insulin glargine U-300  65 Units Subcutaneous QAM      levothyroxine  200 mcg Oral Once per day on Mon Tue Wed Thu Fri Sat     levothyroxine  400 mcg Oral Weekly     liraglutide  0.6 mg Subcutaneous Daily     menthol (Topical Analgesic) 2.5%   Topical 4x Daily     metoprolol succinate ER  50 mg Oral Daily     miconazole   Topical BID     nicotine  1 patch Transdermal Daily    And     nicotine   Transdermal Q8H OPHELIA     sennosides  8.6 mg Oral BID     umeclidinium  1 puff Inhalation Daily        acetaminophen, albuterol, albuterol, sore throat, capsaicin, clotrimazole, glucose **OR** dextrose **OR** glucagon, diclofenac, docusate sodium, enema compound (docusate/mineral oil/NaPhos) NO MAG CITRATE MIXTURE, hydrALAZINE, hydrOXYzine **OR** hydrOXYzine, insulin aspart, methocarbamol, mineral oil-hydrophilic petrolatum, mineral oil-hydrophilic petrolatum, naloxone **OR** naloxone **OR** naloxone **OR** naloxone, nicotine, oxyCODONE, - MEDICATION INSTRUCTIONS -, simethicone     PHYSICAL EXAM  /75 (BP Location: Right arm)   Pulse 78   Temp 97.2  F (36.2  C) (Oral)   Resp 18   Wt (!) 219.1 kg (483 lb 0.5 oz)   SpO2 95%   BMI 75.65 kg/m       Gen: NAD, sitting in chair  Cardio: appears well-perfused  Pulm: non-labored on room air  Abd: obese, soft, non-tender  Ext: lymphedema wraps in place - severe bilateral onychomycosis  Neuro/MSK: Full exam was deferred for conversation today - had tenderness to palpation at L medial elbow and some weakness at flexor digitorum superficialis and flexor digitorum profundus on the left side for 4th and 5th digits. Also paresthesia at 3-5 digits        LABS  Last Basic Metabolic Panel:  Recent Labs   Lab Test 02/26/23  0800 02/26/23  0156 02/25/23  2130 02/23/23  1557 02/23/23  1155 02/22/23  1742 02/22/23  1631 02/21/23  0753 02/21/23  0643   NA  --   --   --   --  137  --  135*  --  137   POTASSIUM  --   --   --   --  4.3  --  4.3  --  4.7   CHLORIDE  --   --   --   --  100  --  99  --  102   CO2  --   --   --   --  29  --  24  --   27   ANIONGAP  --   --   --   --  8  --  12  --  8   * 195* 178*   < > 259*   < > 126*   < > 209*   BUN  --   --   --   --  47.5*  --  52.4*  --  54.8*   CR  --   --   --   --  1.16*  --  1.28*  --  1.45*   GFRESTIMATED  --   --   --   --  58*  --  51*  --  44*   JUSTIN  --   --   --   --  9.2  --  9.6  --  9.2    < > = values in this interval not displayed.     CBC RESULTS:   Recent Labs   Lab Test 02/23/23  1155 02/21/23  0643 02/20/23  1920   WBC 10.4 8.1 8.1   RBC 4.21 4.20 4.27   HGB 12.3 12.1 12.3   HCT 40.1 41.3 41.4   MCV 95 98 97   MCH 29.2 28.8 28.8   MCHC 30.7* 29.3* 29.7*   RDW 16.3* 16.4* 16.6*    251 256       ASSESSMENT AND PLAN  Pricilla Brown is a 48 year old female with a past medical history of chronic HFrEF 2/2 NICM, persistent atrial fibrillation (on Eliquis), asthma, diabetes mellitus type II, hypertension, obesity, hypothyroidism 2/2 radioablation for Graves disease, JYOTI, CKD, and recent admission for HF exacerbation (12/25/22-1/1/23) who was admitted on 1/23/23 with heart failure exacerbation with hospital course complicated by encephalopathy, hypercarbia, NIRAJ, hyperkalemia, labile blood glucose, UTI, dry gangrene of toes on right foot, and pain.  She is now admitted to ARU on 2/14/23 for multidisciplinary rehabilitation and ongoing medical management.        Admission to acute inpatient rehab 02/14/23.    Impairment group code: 09 Cardiac -  heart failure exacerbation       Rehabilitation - continue comprehensive acute inpatient rehabilitation program with multidisciplinary approach including therapies, rehab nursing, and physiatry following. See interval history for updates.        Medical Conditions  New actions/orders/updates for today are in blue.     Acute on Chronic Systolic Heart Failure (EF 40-45%)  Long-Standing Persistent Atrial Fibrllation  Hypertension   Troponin Elevation 2/2 CHF  [PTA meds: apixaban 5 mg BID, atorvastatin 40 mg daily, Bumex 5 mg BID, hydralazine 12.5  mg TID, hydrochlorothiazide 12.5 mg BID, isosorbide dinitrate 20 mg TID, Toprol XL 75 mg daily, spironolactone 50 mg daily]  Pt admitted with c/o increasing SOB, MURRAY, chest tightness, weight gain in spite of compliance with PO diuretics. NT BNP 5891 (was 2030 during most recent admission).  Lactic acid 2.2 on admission with improvement to 1.1 with diuresis. Troponin mildly elevated, 67-->75-->60 likely 2/2 CHF exacerbation; no concern for ACS currently. Most recent Echo with EF 40-45%. Coronary angiogram 2012 with no significant CAD. Of note, pt recently admitted 12/25-1/1 HF exacerbation, diuresed to 472lbs. Pt weight on admission 1/23 555lbs and down to 450lbs on discharge to ARU.  Vitals:    02/20/23 1754 02/22/23 0700 02/24/23 0400 02/25/23 0700   Weight: (!) 221.9 kg (489 lb 3.2 oz) (!) 223.8 kg (493 lb 6.4 oz) (!) 205 kg (452 lb) (!) 219.1 kg (483 lb 1.6 oz)    02/26/23 0700   Weight: (!) 219.1 kg (483 lb 0.5 oz)   - Continue Bumex 1 mg daily, with additional prn dose for weight gain;S/p extra doses of Bumex on 2/18, 2/19, 2/20, and 2/21. 02/22/23 dose was increased to 1mg bid  - Per cardiology, suspect EDW is around 445-450 lbs.  Monitor daily weights.  Call [facility] clinic if weight increases 5 lbs in a day two days in a row or 10 lbs in 1 week.  Weight remains variable and not very accurate  - Continue I/O, low Na diet, 2L fluid restriction   - BB: Continue Toprol XL 25mg daily  - ACEi/ARB/ARNI: Discontinued Lisinopril d/t allergy, if BP elevated, can trial Losartan    - SGLT2i: Contraindicated with recurrent yeast infections   - Aldosterone Antagonist: Discontinued Aldactone due to NIRAJ, hyperkalemia  - Rate Control: BB as above. Given mildly reduced EF, ok to use Diltiazem 120 mg BID  - Anticoagulation: CHADSVASC 3; Continue PTA apixaban  - Afterload reduction: Continue hydralazine 10 mg TID (hold if SBP <110), can give additional PRN dose if SBP >180  - [facility] consulted; follow up after ARU discharge.  Plan to  "discuss CardioMEMS at this appointment.  Currently scheduled on 2/24.     Chronic Kidney Disease Stage 3b  NIRAJ, improving  Longstanding history of diabetes and HTN. Baseline Cr ~1.2-1.4. Cr on admission 1.56. During hospitalization, patient diuresed to 427 lbs with NIRAJ, Cr peak 2.1. After backing off on diuretics, Cr improved to 1.4 on 2/14 at admission to ARU  - Avoid nephrotoxic agents  - Resume diuretics as listed above  - Trend BMP every M/Th.  Cr uptrending to 1.49 on 2/19.  2/21: Cr stable at 1.45. 02/23/23 creatinine improved today     Hyperkalemia, resolved  Potassium continually rising since 2/9 with peak 2/11 @ 7.3.  EKG without acute changes, rhythm stable in a fib.  Nephrology consulted.  Potassium shifted with insulin, calcium gluconate for cardiac stability, lokelma 15 g X 1, IV Bumex total of 6 mg on 2/11/23.  Spironolactone discontinued.  - Low K diet  - I/O  - Continue to hold spironolactone, per nephrology can consider resuming once Cr stable  - Diuresis as above  - Trend BMP every M/Th.  2/21: K stable/WNL at 4.7     Hyponatremia  In setting of aggressive diuresis.  Now mild, improved to 134 on 2/14.  - Trend BMP every M/Th.  2/21: Na stable/WNL at 137     Hypothyroidism 2/2 below  Graves Disease s/p Radioiodine Ablation  TSH 20.4 and free T4 0.82 on admission. Endocrine consulted.  - Continue levothyroxine 200 mcg M-Sat + 400 mcg on Sunday.    - Recheck TFT in a month     Type 2 Diabetes Mellitus, insulin-dependent, uncontrolled  Hyperglycemia  Diabetic neuropathy  A1c 13.5% on 12/25/2022. Discrepancies in patient's home regimen.  Per endo, PTA on \"Toujeo 225 units daily, novolog 100 units with each meal + CS, and ozempic 1 mg weekly. She could not tolerate empaglifozin due to frequent UTIs and metformin due to GI side effects.\" Endocrinology consulted during admission.  Insulin gtt started 2/1 per endocrinology, transitioned back to subcutaneous lantus and novolog on 2/3/23.   Recent Labs   Lab " 02/26/23  0800 02/26/23  0156 02/25/23  2130 02/25/23  1718 02/25/23  1146 02/25/23  0746   * 195* 178* 216* 173* 229*   - Monitor BG TIC AC + HS + 0200  - Endocrinology continuing to follow, appreciate assistance.  Per their recs on 2/22:    - Continue Lantus 47 units qAM. 02/22/23 was increased to 55 units. 02/24/23 increased to 60 units. 02/26/23 switch from Lantus (glargine U100) to Tuojeo (glargine U300)- same dose of 65 units AM. Reassess for increase tomorrow.   - Continue Novolog custom (2/30) sliding scale insulin TID AC and HS  - Continue Novolog 1:4g CHO with breakfast and lunch, 1:5g with dinner and snacks (except 0200 snack, which should be given without coverage to limit hypoglycemia risk during sleep)  - Unable to resume PTA Ozepmic at ARU admission (not on formulary; held due to recent NIRAJ).  Can consider substitution with liraglutide if indicated. 02/25/23 started victoza  - Hypoglycemia protocol  - OP follow up Thelma Archibald/Dr Silva Damon, scheduled on 3/17     Necrotic wounds of Right Toes 1, 3, and 4, resolved  Present on admission, pt unaware how long wounds have been present.  Vascular consulted; JOSETTE's completed showing patent vessels and normal waveforms. They recommend orthopedic foot and ankle consult.  Ortho consulted, felt consistent with dry gangrene, plan for wound care and follow-up as outpatient.   WOCN following as inpatient and provided wound care recs.  - WOCN consulted at ARU to continue to follow, appreciate assistance.  WOCN assessed toes on 2/15, debrided devitalized skin from tips of digits 1 and 4, intact skin beneath with no open wounds, no evidence of dry gangrene.  Did lose toenails on those digits.  - Wound cares updated per WOC recs: Bilateral toes and feet: Wash daily with warm water, soap and a washcloth, taking particular attention to wash well between and under toes. Dry thoroughly with a clean towel. Moisturize skin on bilateral feet and legs with  Sween 24. Paint missing toenail beds on great and 4th toes with betadine.  Also needs consistent foot cares.  - WBAT  - Follow up with podiatry as outpatient (rescheduled from 2/17 to 3/3 with Dr. Tosin haro still anticipate at ARU). 02/26/23 placed a new consult as IP for Monday per nursing's concerns for toenails (onychomycosis) and skin issues.     Urinary Retention  Phan catheter placed on admission for strict I/O while aggressively diuresing. Removed 2/11, pt with urinary retention and straight cath x 3, Phan replaced. Urology curbside, continue phan for now, can re-attempt voiding trial as OP.  Per patient request, with improving transfers to facilitate toileting needs, phan removed on 2/17 for trial of void.    - Is voiding, but only a few times per day with quite large volumes (800-1000 mL at times), concerned she may still be retaining, especially in setting of recent NIRAJ.  Treating UTI as below.  Need to resume regular bladder scanning if no void for 4 hours and for PVRs.  Implement timed toileting.  If ongoing retention once UTI treated, would need to consider replacing phan.  - If ongoing retention or phan replaced, follow up with OP Urology    E coli UTI  UA on 2/19 with large leuk, pyuria, bacteriuria.  Started on empiric ciprofloxacin.  UC with >100k colonies E coli, susceptible to cipro.    - Continue cipro 500 mg BID, plan for 5-day course     Asthma  Patient endorsing mouth irritation w/ inhaler use. Per inpatient team, roof of mouth erythematous and appears excoriated but w/o evidence of yeast.   - Continue PTA inhalers (levalbuterol and spiriva - substitute by Incruse Ellipta)  - Magic mouthwash    Tobacco use disorder  - Continue nicotine patch 14 mcg, started during recent (December) admission  - PRN nicorette gum added 2/20 for breakthrough cravings     JYOTI  - Continue BiPAP at night  - Patient reports that she has not been using BiPAP due to issues with mask, instead using oxygen with  sleep.  RT met with patient on 2/20, but patient declined all alternative mask choices and expressed preference to continue nasal cannula with sleep while here instead.    - RT met with patient again on 2/21.  Patient feels hospital mask too small despite largest size available.  Recommended to bring home equip to use. 02/22/23 ABG was drawn and reviewed; she tried BiPAP again per hospitalist team recs and encouragement.     Hyperlipidemia   - Continue PTA Lipitor 40 mg daily     Acute low back pain with bilateral radiculopathy, improving  Chronic Pain  [PTA meds: Tylenol PRN, diclofenac QID PRN, gabapentin 600 mg TID, oxycodone 5 mg 5x/day]  Of note, per PDMP #140 tabs of oxycodone 5 mg were filled on 2/16 while patient hospitalized.  Patient reporting diffuse joint pain while inpatient. Using narcotics along with gabapentin, topical nsaids, and muscle relaxants. Aggressive diuresis vs diabetic neuropathy.  Pain management curbside consulted 1/31 while inpatient; decrease gabapentin to BID, stopped flexeril and added robaxin.  Pain improved initially at ARU admission but significantly increased pain in bilateral low back radiating to bilateral buttocks and posterior legs to feet.  Decreased with ambulation, but increased with any movement in bed.  Lumbar/thoracic xrays with grossly maintained vertebral height, no evidence of fracture.  Right thigh and hip xray with no evidence of fracture, mild degenerative changes.  US RLE 2/19 negative for DVT. US of soft issue showed 1. No fluid collection identified to suggest abscess. 2. Mild subcutaneous edema.   02/22/23 localized posterior right thigh pain seems more MSK related and secondary to severe cramps / trigger point. Will continue warm pack, added bengay qid and PT will try TENS. No underlying medical / electrolyte abnormality that is contributing at this point. Deconditioning and disuse atrophy are likely contributing. Will also discuss with Dr. Velazquez to  "continue working on relaxation techniques and coping with chronic pain.   - Continue PRN Tylenol, voltaren (feet - not using), capsaicin (feet - not using), robaxin, and hydroxyzine. Continue stretching and modalities.  -Lidocaine patch. 02/23/23 discontinued as she has not been using  - Resumed PTA gabapentin 600 mg TID on 2/21  - Continue Topamax 25 mg daily. 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 Symptoms were worse that day as she tried to catch herself in the bathroom the day before. Exam suggestive of ulnar neuropathy at the elbow; talked to PT and nursing about elbow support when sitting in chair. Ordered L elbow sleeve with padding to be used during the day. Can consider NCS/EMG as outpatient for more eval.        Morbid obesity  Estimated body mass index is 75.65 kg/m  as calculated from the following:    Height as of 1/23/23: 1.702 m (5' 7\").    Weight as of this encounter: 219.1 kg (483 lb 0.5 oz).  - Complicates rehab/cares    Possible COVID-19 exposure  Per ID, patient was previously on unit with high prevalence of COVID-19.  Tested negative on 2/15.  Infection prevention recommending repeat testing to confirm negative at 5-7 days.  Ordered on 2/16 by ID provider; patient declining despite education.  Afebrile, no signs/symptoms of COVID infection.  COVID PCR negative on 2/20.        Adjustment to disability:  Clinical psychology evaluation on February 22 for Adjustment disorder with depressed mood; see consult note for more details  FEN: mod cons CHO, 2g NA, 2g K, 2L fluid restriction  Bowel: continent, monitor, PRN bowel meds available  Bladder: phan removed on 2/17 for TOV as above  DVT Prophylaxis: apixaban  GI Prophylaxis: not indicated  Code: " full  Disposition: goal for home  ELOS:  10-14 days  Follow up Appointments on Discharge: PCP in 1-2 weeks, cardiology ([facility], scheduled 2/24), endocrinology (Thelma Archibald/Dr Silva Damon on 3/17), urology, ortho/podiatry (3/3 with Dr. Leahy)        Helen Harris MD  Physical Medicine & Rehabilitation

## 2023-02-26 NOTE — PROGRESS NOTES
[facility]  WO Nurse Inpatient Assessment     Consulted for: Bilateral feet    Patient History (according to provider note(s):      Per Dr Vladislav Vazquez on 2/14/2023: Pricilla Brown is a 48 year old female with a past medical history of chronic HFrEF 2/2 NICM, persistent atrial fibrillation (on Eliquis), asthma, diabetes mellitus type II, hypertension, obesity, hypothyroidism 2/2 radioablation for Graves disease, JYOTI, CKD, and recent admission for HF exacerbation (12/25/22-1/1/23) who was admitted on 1/23/23 with heart failure exacerbation with hospital course complicated by encephalopathy, hypercarbia, NIRAJ, hyperkalemia, labile blood glucose, UTI, dry gangrene of toes on right foot, and pain.  She is now admitted to ARU on 2/14/23 for multidisciplinary rehabilitation and ongoing medical management.    Areas Assessed:      Areas visualized during today's visit: Bilateral feet    Wound location: Bilateral feet and toes     2/15 Right                                                        2/15 Left    Per H&P: Present on admission, pt unaware how long wounds have been present.  Vascular consulted; JOSETTE's completed showing patent vessels and normal waveforms. They recommend orthopedic foot and ankle consult.  Ortho consulted, plan for wound care and follow-up as outpatient.   WOCN following as inpatient and provided wound care recs.    On [facility] assessment today, bilateral feet and toes washed well with soap and water to help remove devitalized skin. Devitalized skin trimmed away from dorsal and plantar aspects of the feet. On the bilateral toes, devitalized epidermis peeled away easily to reveal intact, pink skin. Specifically, on right great and 4th toes, necrotic caps pulled away with nail attached to reveal intact, pink epidermis.     Currently, the only open wounds present are on the nailbeds of the great and 4th toes. Remainder is skin is intact, soft, warm to touch,  no evidence of dry gangrene.     Patient will benefit from consistent foot care.    2/20: Toenails remain open, now using mepilex while socks on to prevent lint getting in wounds. Improved skin on BLE.   2/26: stable. Pt is concerned she can't wear her shoes due to remaining toenails being long. May benefit from podiatry to trim nails if possible. WOC signing off.    Treatment Plan:     Bilateral toes and feet: Wash daily with warm water, soap and a washcloth, taking particular attention to wash well between and under toes. Dry thoroughly with a clean towel. Moisturize skin on bilateral feet and legs with Sween 24. [place] missing toenail beds on great and 4th toes with betadine.      Orders: Written    RECOMMEND PRIMARY TEAM ORDER: None, at this time  Education provided: plan of care  Discussed plan of care with: Patient, Nurse and Physician  WOC nurse follow-up plan: signing off  Notify WOC if wound(s) deteriorate.  Nursing to notify the Provider(s) and re-consult the WOC Nurse if new skin concern.    DATA:     Current support surface: Standard  Low air loss (PATY pump, Isolibrium, Pulsate, skin guard, etc): discussed with RN that patient does not have any pressure injuries or open wounds. According to the bed algorithm, patient can use a standard bariatric mattress   Containment of urine/stool: Continent of bowel and Indwelling catheter  BMI: Body mass index is 75.65 kg/m .   Active diet order: Orders Placed This Encounter      Combination Diet 2 gm K Diet; Moderate Consistent Carb (60 g CHO per Meal) Diet; 2 gm NA Diet     Output: I/O last 3 completed shifts:  In: 237 [P.O.:237]  Out: -      Labs:   Recent Labs   Lab 02/23/23  1155 02/22/23  1631   ALBUMIN  --  3.1*   HGB 12.3  --    WBC 10.4  --      Pressure injury risk assessment:   Sensory Perception: 3-->slightly limited  Moisture: 3-->occasionally moist  Activity: 2-->chairfast  Mobility: 3-->slightly limited  Nutrition: 3-->adequate  Friction and Shear:  2-->potential problem  Shane Score: 16    Jody Bailey, RN BSN CWOCN  Contact through Vocera: [facility] ([facility])  Vocera group: [facility]  Dept. Office Number: [phone]

## 2023-02-26 NOTE — PLAN OF CARE
Goal Outcome Evaluation:      Plan of Care Reviewed With: patient    Overall Patient Progress: no changeOverall Patient Progress: no change     Patient was sleeping at the start of the shift  Alert and oriented, calls appropriately  Awake at 2am, BG checked, had snacks, sat on the edge of the bed  Utilized lift when bed linen changed and repositioned pt, pillows provided for comfort, all needs met  Denied pain, headache, chest pain, N&V, no SOB  Still on Fluid restriction  Continent of B&B, had episode of incontinence of urine x1, no Bm this shift  Compliant in wearing Bipap overnight  Safety rounding checked completed, 3 side rails UP, bed alarm ON, call light in reach  Continue with POC.

## 2023-02-26 NOTE — TELEPHONE ENCOUNTER
Action February 25, 2023 8:42 PM MT   Action Taken Sent a request for imaging from [facility] [phone].     Action February 28, 2023 4:14 PM MT   Action Taken Imaging resolved.      DIAGNOSIS: discuss elective toe amputation, unspecified laterality   APPOINTMENT DATE: 03/03/2023   NOTES STATUS DETAILS   OFFICE NOTE from referring provider SELF    OFFICE NOTE from other specialist Internal 09/28/2020 - Norman Jean DPM - [facility]    DISCHARGE REPORT from hospital Care Everywhere 12/09/2022 - [facility]   MEDICATION LIST Internal    LABS     MRI PACS Internal   CT SCAN PACS Internal   ULTRASOUND PACS Internal    [facility]:  12/09/2022 - Bilateral Lower Extremity   XRAYS (IMAGES & REPORTS) PACS Internal    [facility]:  12/10/2022 - LT Foot

## 2023-02-26 NOTE — PLAN OF CARE
Discharge Planner Post-Acute Rehab PT:      Discharge Plan: Home (apt) 4 steps  with bilat rails + 1 platform, with resumed PCA services, home PT     Precautions: falls     Current Status:  Bed Mobility: SBA rolling L/R. Min A supine<>sit  Transfer: SBA with FWW  Gait:  CGA for 22' with fww.  Noted RLE weakness and heavy lateral weight-shift limiting stability   Stairs: 4 inch step up in bars - lead with LLE, down with RLE, cga.   Balance: able with bilat UE support on walker     Assessment:  Pt still with R posterior LE pain (was better on 2/25 than today) and emotional about decision making about going home.  Pt able to perform one step up with 4 inch step, but not 6 inch step, emotional at times.  Pt participated in gentle stretching, STM and therex to help manage RLE tightness and pain.  Pt using heat , which seems to help , too.  Pt given encouragement that the leg pain will subside.  Pt educated to change positions regularly in room (had numbness/ nerve compression  in legs from sitting  too long in chair), perform isometric exercises and use heat.  Pt also has a theracane to use to massage posterior part of R leg.   If pt wants to leave soon, it seems her sister's home makes sense, but pt will think about this and discuss with primary team on Monday . P.m. session: attempted RLE PROM and gentle stretching but Pt unable to tolerate d/t pain. Educated on use of ice packs then alternating w/heating pad to tamp down inflammation. Located and placed ice packs to address pain and inflammation. Nsg to remove in ~15 - 20 min.       Other Barriers to Discharge (DME, Family Training, etc):   Stairs to enter home- pt states she could stay at sisters w/o stairs if needed. 2/24 - pt stating she has more than one option for alternate dispo where there are no stairs to enter   Pt's SO to measure step height, bed height  Pt has 4WW

## 2023-02-26 NOTE — PLAN OF CARE
Discharge Planner Post-Acute Rehab OT:     Discharge Plan: home with pca 10 hrs during the day and 4 at night    Precautions: falls, bariatric equipment required    Current Status:  ADLs:    Mobility: SBA asha FWW in-room distances.    Grooming: IND seated w/ set up.    Dressing:  UB:ot pt able todon pull over shirt after clothes retrieve for her. LB- with extra time while sitting on commode over toilet with use of reacher able to get both legs into shorts and up legs max a to adjust over hips.standing at fww.     Feet- socks pt able to don after set up of sock aide no instruction needed    Bathing: pt able towash 8/10 areas assist with feet and buttocks. dep liko to rollling shower chair    Toileting: Transfer SBA FWW <> asha commode overlay on toilet. Hygiene mod i sitting with priyanka cares with toilet aide while sitting.  IADLs:tba  Vision/Cognition: Anxious. assess higher cog     Assessment: Pt endorsed pain in hip but agreeable to modified intervention. Nerve ex assisted w/ some relief of L hand ulnar/median nerve distribution. Pt requesting to go home Tuesday. Will discuss w/ team on Monday. Pt will go to sister's home for temporary time and may not need to do stairs. Pt will go home w/ prior PCA services/family assistance and homecare OT.    Other Barriers to Discharge (DME, Family Training, etc):  Will likely need:   -bariatric commode overlay for toilet during daytime and to use as BSC for nighttime PRN  -bariatric extended tub bench-toilet aide   -wide sock aide  -reacher    pt has in room sock aide dressing stick , reacher and toilet hygiene aide, leg

## 2023-02-26 NOTE — PROGRESS NOTES
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  She is now admitted to ARU on 2/14/23 for rehabilitation.     Assessment:   1)Type II Diabetes Mellitus, uncontrolled (A1c 13.5%), with insulin resistance.    Plan:    -Tuojeo (glargine U300)-65 units AM.    -Victoza 0.6 mg daily AM - increase on Tuesday to 1.2 mg    -Novolog reduced from 1:4g to 1:5g CHO with meals, snacks, supplements, beginning with dinner tonight (and will reassess again tomorrow, with higher Victoza on board)   -patient care order: \"pt may have 0200 snack (15g) without carb coverage, if concerned about low BG overnight\"   -Novolog custom 1/20 intensity sliding scale TID AC and HS   -BG monitoring TID AC, HS, 0200                 -hypoglycemia protocol                 -carb counting protocol                 -diabetes education needs will be assessed closer to discharge.     Tentative Plan for Discharge:    -stop Jardiance, due to UTI history.   -resume PTA Tuojeo: ** units daily AM (substituted Lantus here)   -resume Ozempic 1 mg once weekly (substituted Victoza here)   -Novolog ** units with a meal, ** units with a snack/breakfast shake   -Novolog sliding scale AC/HS    Outpatient follow up: with [facility] - has appt 3/17/23    Interval History and Assessment: interval glucose trend reviewed:   BG more variable yesterday. Missed coverage for snacks that she'll graze on " "occasionally    To 75 mg/dL in evening. She had a therapy session around that time.   Also, could be related to the switch to Tuojeo (more concentrated basal insulin with more stable systemic absorption at higher doses)- she noticed this happened when they switched her to Tuojeo in the past. She'd prefer a gentle reduction in mealtime insulin dosing today for safety.    Cr remains stable.     2/25: Pricilla endorsed she'd like to mirror home eating habits, to ensure insulin dosing is sufficient (we agree with this). She normally has only a Premier protein shake at breakfast- ordered Glucerna here for her to trial.     Current nutritional intake and type: Orders Placed This Encounter      Combination Diet 2 gm K Diet; Moderate Consistent Carb (60 g CHO per Meal) Diet; 2 gm NA Diet      PTA Diabetes Regimen:   Tuojeo 40 unit(s) once daily at HS  Aspart 10 units with each meal + additional correction scale insulin as follows based on pre-meal glucose      -140 - 160: + 1 unit     -161 - 180: + 2 units     -181 - 200: + 3 units, and so on (sensitivity of 20 mg/dL)     Ozempic 1 mg weekly - she reports she did NOT resume as instructed     BG monitoring frequency:  3-4 times per day  BG trends at home: \"all over\" but mostly between 85 - 300      Historical:   Tuojeo (glargine U300) 225 units once daily  Novolog 100  Units with meals, plus 1/15 correction scale  Trulicity 1mg weekly on Mondays  Jardiance 25 mg daily AM - stopped 2/2 UTI's   (has not tolerated Metformin in the past)    Discharge Planning: per primary team notes, ~3/3/23.           Diabetes History:   Type of Diabetes: Type 2 Diabetes Mellitus  Lab Results   Component Value Date    A1C 13.5 12/25/2022    A1C >15.0 07/23/2021    A1C >14.0 08/19/2020    A1C 8.4 10/11/2018    A1C 9.6 05/29/2018    A1C 9.0 04/07/2017    A1C 12.1 03/10/2016              Review of Systems:     The Review of Systems is negative other than noted in the Interval History.           " Medications:     Current Facility-Administered Medications   Medication     acetaminophen (TYLENOL) tablet 650 mg     albuterol (PROVENTIL HFA/VENTOLIN HFA) inhaler     albuterol (PROVENTIL) neb solution 2.5 mg     apixaban ANTICOAGULANT (ELIQUIS) tablet 5 mg     atorvastatin (LIPITOR) tablet 40 mg     benzocaine-menthol (CHLORASEPTIC) 6-10 MG lozenge 1 lozenge     bumetanide (BUMEX) tablet 1 mg     capsaicin (ZOSTRIX) 0.025 % cream 1 applicator     clotrimazole (LOTRIMIN) 1 % cream     glucose gel 15-30 g    Or     dextrose 50 % injection 25-50 mL    Or     glucagon injection 1 mg     diclofenac (VOLTAREN) 1 % topical gel 2 g     diltiazem ER (CARDIZEM SR) 12 hr capsule 120 mg     docusate sodium (COLACE) capsule 100 mg     docusate sodium (COLACE) capsule 50 mg     enema compound (docusate/mineral oil/NaPhos) NO MAG CITRATE MIXTURE     gabapentin (NEURONTIN) capsule 600 mg     hydrALAZINE (APRESOLINE) tablet 10 mg     hydrALAZINE (APRESOLINE) tablet 10 mg     hydrOXYzine (ATARAX) tablet 25 mg    Or     hydrOXYzine (ATARAX) tablet 50 mg     insulin aspart (NovoLOG) injection (RAPID ACTING)     insulin aspart (NovoLOG) injection (RAPID ACTING)     insulin aspart (NovoLOG) injection (RAPID ACTING)     insulin aspart (NovoLOG) injection (RAPID ACTING)     insulin glargine U-300 (TOUJEO) injection 65 Units     levothyroxine (SYNTHROID/LEVOTHROID) tablet 200 mcg     levothyroxine (SYNTHROID/LEVOTHROID) tablet 400 mcg     [START ON 2/28/2023] liraglutide (VICTOZA) injection 1.2 mg     menthol (Topical Analgesic) 2.5% (BENGAY VANISHING SCENT) 2.5 % topical gel     methocarbamol (ROBAXIN) tablet 500 mg     metoprolol succinate ER (TOPROL XL) 24 hr tablet 50 mg     miconazole (MICATIN) 2 % powder     mineral oil-hydrophilic petrolatum (AQUAPHOR)     mineral oil-hydrophilic petrolatum (AQUAPHOR)     naloxone (NARCAN) injection 0.2 mg    Or     naloxone (NARCAN) injection 0.4 mg    Or     naloxone (NARCAN) injection 0.2 mg     Or     naloxone (NARCAN) injection 0.4 mg     nicotine (NICODERM CQ) 14 MG/24HR 24 hr patch 1 patch    And     nicotine Patch in Place     nicotine (NICORETTE) gum 2 mg     oxyCODONE (ROXICODONE) tablet 5 mg     Patient is already receiving anticoagulation with heparin, enoxaparin (LOVENOX), warfarin (COUMADIN)  or other anticoagulant medication     sennosides (SENOKOT) tablet 8.6 mg     simethicone (MYLICON) chewable tablet 80 mg     umeclidinium (INCRUSE ELLIPTA) 62.5 MCG/ACT inhaler 1 puff     Facility-Administered Medications Ordered in Other Encounters   Medication     sodium chloride (PF) 0.9% PF flush 10 mL            Physical Exam:    /75 (BP Location: Right arm)   Pulse 78   Temp 97.2  F (36.2  C) (Oral)   Resp 18   Wt (!) 219.1 kg (483 lb 0.5 oz)   SpO2 95%   BMI 75.65 kg/m      GENERAL : In no apparent distress, over the phone  HEENT: hearing intact  RESP: normal respiratory effort. No cough  PSYCH: calm interaction   NEURO: awake, alert, responds appropriately          Data:     Recent Labs   Lab 02/27/23  1147 02/27/23  1024 02/27/23  0832 02/27/23  0206 02/26/23  2152 02/26/23  1858   * 156* 117* 137* 119* 99     Lab Results   Component Value Date    WBC 8.4 02/27/2023    WBC 10.4 02/23/2023    WBC 8.1 02/21/2023    HGB 13.0 02/27/2023    HGB 12.3 02/23/2023    HGB 12.1 02/21/2023    HCT 41.6 02/27/2023    HCT 40.1 02/23/2023    HCT 41.3 02/21/2023    MCV 93 02/27/2023    MCV 95 02/23/2023    MCV 98 02/21/2023     02/27/2023     02/23/2023     02/21/2023     Lab Results   Component Value Date     02/27/2023     02/23/2023     (L) 02/22/2023    POTASSIUM 5.7 (H) 02/27/2023    POTASSIUM 4.3 02/23/2023    POTASSIUM 4.3 02/22/2023    CHLORIDE 101 02/27/2023    CHLORIDE 100 02/23/2023    CHLORIDE 99 02/22/2023    CO2 23 02/27/2023    CO2 29 02/23/2023    CO2 24 02/22/2023     (H) 02/27/2023     (H) 02/27/2023     (H) 02/27/2023      Lab Results   Component Value Date    BUN 47.5 (H) 02/23/2023    BUN 52.4 (H) 02/22/2023    BUN 54.8 (H) 02/21/2023     Lab Results   Component Value Date    TSH 20.40 (H) 01/23/2023    TSH 14.30 (H) 12/25/2022    TSH 6.79 (H) 09/09/2021     Lab Results   Component Value Date    AST 24 02/22/2023    AST 30 02/19/2023    AST 37 (H) 01/29/2023    ALT 17 02/22/2023    ALT 18 02/19/2023    ALT 11 01/29/2023    ALKPHOS 122 (H) 02/22/2023    ALKPHOS 122 (H) 02/19/2023    ALKPHOS 97 01/29/2023       50 minutes spent on the date of the encounter doing chart review, history and exam, documentation and further activities per the note        Contacting the Inpatient Diabetes Team   From 8AM-4PM: page inpatient diabetes provider that is following the patient, or utilize the job code paging system.   From 4PM-8AM: page the job code for endocrine fellow on call.       Please use the following job code to reach the Inpatient Diabetes team. Note that you must use an in house phone and that job codes cannot receive text pages.     Dial [phone] (or star-star-star [phone] on the new [employer] telephones), then [phone] to reach the endocrine-diabetes provider on call.    YOCASTA Law, CNP  Inpatient Diabetes Management Service  Pager [phone]

## 2023-02-26 NOTE — PLAN OF CARE
VS: Blood pressure 124/63, pulse 98, temperature 97.2  F (36.2  C), temperature source Oral, resp. rate 18, weight (!) 219.1 kg (483 lb 0.5 oz), SpO2 95 %, not currently breastfeeding.   O2: 95% on RA.   Output: Voids spontaneously in toilet.    Last BM: 02/23/23   Activity: A1 with walker. Pt refuses GB.    Skin: Bilateral foot dryness, crack. R foot wound care perfroemd by [facility] nurse. Pt wears Mepilex on R toes with sock. Podiatry consult put in by [facility] nurse. Pt concerned about putting shoes on.    Pain: Pain managed this shift with PRN Tylenol and Oxycodone. Pt complained of pain in L forearm and hand. Pt complained of pain in R calf. Ice pack, heat applied to R calf.    CMS: AOX4. Denies new numbness, tingling, CP, dizziness.    Dressing: Protective mepilex applied to R foot for when pt puts sock on.    Diet: Combination carb count, 2 G NA, fluid restriction 200 mL. Pt had 1080 mL fluids this shift. Regular diet, thin liquids, takes pills whole.    Plan: Continue POC. Pt hoping to discharge to sister's house.

## 2023-02-26 NOTE — PROGRESS NOTES
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  She is now admitted to ARU on 2/14/23 for rehabilitation.     Assessment:   1)Type II Diabetes Mellitus, uncontrolled (A1c 13.5%), with insulin resistance.    Plan:    -switch from Lantus (glargine U100) to Tuojeo (glargine U300)- same dose of 65 units AM. Reassess for increase tomorrow.    -Victoza 0.6 mg daily AM - increase on Tuesday to 1.2 mg if tolerating.   -Novolog 1:4g CHO with breakfast/lunch, and increased from  1:5g CHO to 1:4g CHO with dinner/snacks   -patient care order: \"pt may have 0200 snack (15g) without carb coverage, if concerned about low BG overnight\"   -Novolog custom 1/20 intensity sliding scale TID AC and HS   -BG monitoring TID AC, HS, 0200                 -hypoglycemia protocol                 -carb counting protocol                 -diabetes education needs will be assessed closer to discharge.     Tentative Plan for Discharge:    -stop Jardiance, due to UTI history.   -resume PTA Tuojeo: ** units daily AM (substituted Lantus here)   -resume Ozempic 1 mg once weekly (substituted Victoza here)   -Novolog ** units with a meal, ** units with a snack/breakfast shake   -Novolog sliding scale AC/HS    Outpatient follow up: with [facility] - has appt 3/17/23    Interval History and Assessment: interval glucose trend reviewed:   BG are more stable, and more consistently close to target, " "with Victoza aiding in frequent missed CHO for snacking.     Switching to more concentrated basal insulin- at higher doses, absorption is more effective. If fasting BG remain above goal tomorrow, will then proceed with increasing dose.     Cr remains stable to improving- likely with improving insulin clearance.     Yesterday, Pricilla endorsed she'd like to mirror home eating habits, to ensure insulin dosing is sufficient (we agree with this). She normally has only a Premier protein shake at breakfast- ordered Glucerna here for her to trial.     Current nutritional intake and type: Orders Placed This Encounter      Combination Diet 2 gm K Diet; Moderate Consistent Carb (60 g CHO per Meal) Diet; 2 gm NA Diet      PTA Diabetes Regimen:   Tuojeo 40 unit(s) once daily at HS  Aspart 10 units with each meal + additional correction scale insulin as follows based on pre-meal glucose      -140 - 160: + 1 unit     -161 - 180: + 2 units     -181 - 200: + 3 units, and so on (sensitivity of 20 mg/dL)     Ozempic 1 mg weekly - she reports she did NOT resume as instructed     BG monitoring frequency:  3-4 times per day  BG trends at home: \"all over\" but mostly between 85 - 300      Historical:   Tuojeo (glargine U300) 225 units once daily  Novolog 100  Units with meals, plus 1/15 correction scale  Trulicity 1mg weekly on Mondays  Jardiance 25 mg daily AM - stopped 2/2 UTI's   (has not tolerated Metformin in the past)    Discharge Planning: per primary team notes, ~3/3/23.           Diabetes History:   Type of Diabetes: Type 2 Diabetes Mellitus  Lab Results   Component Value Date    A1C 13.5 12/25/2022    A1C >15.0 07/23/2021    A1C >14.0 08/19/2020    A1C 8.4 10/11/2018    A1C 9.6 05/29/2018    A1C 9.0 04/07/2017    A1C 12.1 03/10/2016              Review of Systems:     The Review of Systems is negative other than noted in the Interval History.           Medications:     Current Facility-Administered Medications   Medication     " acetaminophen (TYLENOL) tablet 650 mg     albuterol (PROVENTIL HFA/VENTOLIN HFA) inhaler     albuterol (PROVENTIL) neb solution 2.5 mg     apixaban ANTICOAGULANT (ELIQUIS) tablet 5 mg     atorvastatin (LIPITOR) tablet 40 mg     benzocaine-menthol (CHLORASEPTIC) 6-10 MG lozenge 1 lozenge     bumetanide (BUMEX) tablet 1 mg     capsaicin (ZOSTRIX) 0.025 % cream 1 applicator     clotrimazole (LOTRIMIN) 1 % cream     glucose gel 15-30 g    Or     dextrose 50 % injection 25-50 mL    Or     glucagon injection 1 mg     diclofenac (VOLTAREN) 1 % topical gel 2 g     diltiazem ER (CARDIZEM SR) 12 hr capsule 120 mg     docusate sodium (COLACE) capsule 100 mg     docusate sodium (COLACE) capsule 50 mg     enema compound (docusate/mineral oil/NaPhos) NO MAG CITRATE MIXTURE     gabapentin (NEURONTIN) capsule 600 mg     hydrALAZINE (APRESOLINE) tablet 10 mg     hydrALAZINE (APRESOLINE) tablet 10 mg     hydrOXYzine (ATARAX) tablet 25 mg    Or     hydrOXYzine (ATARAX) tablet 50 mg     insulin aspart (NovoLOG) injection (RAPID ACTING)     insulin aspart (NovoLOG) injection (RAPID ACTING)     insulin aspart (NovoLOG) injection (RAPID ACTING)     insulin aspart (NovoLOG) injection (RAPID ACTING)     insulin aspart (NovoLOG) injection (RAPID ACTING)     insulin aspart (NovoLOG) injection (RAPID ACTING)     insulin glargine U-300 (TOUJEO) injection 65 Units     levothyroxine (SYNTHROID/LEVOTHROID) tablet 200 mcg     levothyroxine (SYNTHROID/LEVOTHROID) tablet 400 mcg     liraglutide (VICTOZA) injection 0.6 mg     menthol (Topical Analgesic) 2.5% (BENGAY VANISHING SCENT) 2.5 % topical gel     methocarbamol (ROBAXIN) tablet 500 mg     metoprolol succinate ER (TOPROL XL) 24 hr tablet 50 mg     miconazole (MICATIN) 2 % powder     mineral oil-hydrophilic petrolatum (AQUAPHOR)     mineral oil-hydrophilic petrolatum (AQUAPHOR)     naloxone (NARCAN) injection 0.2 mg    Or     naloxone (NARCAN) injection 0.4 mg    Or     naloxone (NARCAN)  injection 0.2 mg    Or     naloxone (NARCAN) injection 0.4 mg     nicotine (NICODERM CQ) 14 MG/24HR 24 hr patch 1 patch    And     nicotine Patch in Place     nicotine (NICORETTE) gum 2 mg     oxyCODONE (ROXICODONE) tablet 5 mg     Patient is already receiving anticoagulation with heparin, enoxaparin (LOVENOX), warfarin (COUMADIN)  or other anticoagulant medication     sennosides (SENOKOT) tablet 8.6 mg     simethicone (MYLICON) chewable tablet 80 mg     umeclidinium (INCRUSE ELLIPTA) 62.5 MCG/ACT inhaler 1 puff     Facility-Administered Medications Ordered in Other Encounters   Medication     sodium chloride (PF) 0.9% PF flush 10 mL            Physical Exam:    /81 (BP Location: Right arm)   Pulse 76   Temp 98.3  F (36.8  C) (Oral)   Resp 18   Wt (!) 219.1 kg (483 lb 0.5 oz)   SpO2 97%   BMI 75.65 kg/m      GENERAL : In no apparent distress, seen working with PT today.  SKIN: Normal color, no  suspicious lesions or rashes  EYES: No proptosis.  MOUTH: Moist, pink  NECK: No visible masses/goiter  RESP: normal respiratory effort. No cough  NEURO: awake, alert, responds appropriately to PT provider.            Data:     Recent Labs   Lab 02/26/23  0800 02/26/23  0156 02/25/23  2130 02/25/23  1718 02/25/23  1146 02/25/23  0746   * 195* 178* 216* 173* 229*     Lab Results   Component Value Date    WBC 10.4 02/23/2023    WBC 8.1 02/21/2023    WBC 8.1 02/20/2023    HGB 12.3 02/23/2023    HGB 12.1 02/21/2023    HGB 12.3 02/20/2023    HCT 40.1 02/23/2023    HCT 41.3 02/21/2023    HCT 41.4 02/20/2023    MCV 95 02/23/2023    MCV 98 02/21/2023    MCV 97 02/20/2023     02/23/2023     02/21/2023     02/20/2023     Lab Results   Component Value Date     02/23/2023     (L) 02/22/2023     02/21/2023    POTASSIUM 4.3 02/23/2023    POTASSIUM 4.3 02/22/2023    POTASSIUM 4.7 02/21/2023    CHLORIDE 100 02/23/2023    CHLORIDE 99 02/22/2023    CHLORIDE 102 02/21/2023    CO2 29  02/23/2023    CO2 24 02/22/2023    CO2 27 02/21/2023     (H) 02/26/2023     (H) 02/26/2023     (H) 02/25/2023     Lab Results   Component Value Date    BUN 47.5 (H) 02/23/2023    BUN 52.4 (H) 02/22/2023    BUN 54.8 (H) 02/21/2023     Lab Results   Component Value Date    TSH 20.40 (H) 01/23/2023    TSH 14.30 (H) 12/25/2022    TSH 6.79 (H) 09/09/2021     Lab Results   Component Value Date    AST 24 02/22/2023    AST 30 02/19/2023    AST 37 (H) 01/29/2023    ALT 17 02/22/2023    ALT 18 02/19/2023    ALT 11 01/29/2023    ALKPHOS 122 (H) 02/22/2023    ALKPHOS 122 (H) 02/19/2023    ALKPHOS 97 01/29/2023       50 minutes spent on the date of the encounter doing chart review, history and exam, documentation and further activities per the note        Contacting the Inpatient Diabetes Team   From 8AM-4PM: page inpatient diabetes provider that is following the patient, or utilize the job code paging system.   From 4PM-8AM: page the job code for endocrine fellow on call.       Please use the following job code to reach the Inpatient Diabetes team. Note that you must use an in house phone and that job codes cannot receive text pages.     Dial [phone] (or star-star-star [phone] on the new [employer] telephones), then [phone] to reach the endocrine-diabetes provider on call.    YOCASTA Law, CNP  Inpatient Diabetes Management Service  Pager [phone]

## 2023-02-27 NOTE — PLAN OF CARE
Goal Outcome Evaluation:      Plan of Care Reviewed With: patient    Overall Patient Progress: no changeOverall Patient Progress: no change       Patient here acute exacerbation of CHF, alert and oriented, able to make needs known  Slept most of the night, compliant in wearing Bipap  Complained of headache, PRN Tylenol given and repositioned pt, otherwise pt denied chest pain, N&V, no SOB, V/S stable  Mix continence of Bladder, no BM this shift  Safety rounding checked completed, 3 side rails UP, bed alarm ON, call light within reach  Continue with POC

## 2023-02-27 NOTE — Clinical Note
Enmanuel Hayes Pricilla seems to be having a problem with how long her apnea is untreated.  I hope she reached you because her mask is too small.  I am also wondering if the current device that she is using if it set appropriately-she was previously on APAP 5-15 cm of water with a fairly high unknown and a leak of 65 L/min.  Prior to bringing in her machine I believe her leak was approximately 85 L/min with a high residual AHI.  I am in the working on Tuesday please give me a call: [phone].    YOCASTA Garcia, CNP-BC  Sleep Medicine 
[place]

## 2023-02-27 NOTE — PROGRESS NOTES
Patient was measured for a elbow pad. Patient will be fit with brace upon arrival to our office.   LIZA De La Rosa.

## 2023-02-27 NOTE — PLAN OF CARE
Discharge Planner Post-Acute Rehab OT:     Discharge Plan: home with pca 10 hrs during the day and 4 at night    Precautions: falls, bariatric equipment required    Current Status:  ADLs:    Mobility: SBA asha FWW in-room distances.    Grooming: IND seated w/ set up.    Dressing:  UB:ot pt able todon pull over shirt after clothes retrieve for her. LB- with extra time while sitting on commode over toilet with use of reacher able to get both legs into shorts and up legs max a to adjust over hips.standing at fww.     Feet- socks pt able to don after set up of sock aide no instruction needed    Bathing: pt able towash 8/10 areas assist with feet and buttocks. dep liko to rollling shower chair    Toileting: Transfer SBA FWW <> asha commode overlay on toilet. Hygiene mod i sitting with priyanka cares with toilet aide while sitting.  IADLs:tba  Vision/Cognition: Anxious. assess higher cog     Assessment: Following up on plan for DME. Rehab tech has called [facility] to see if we can get a bariatric commode and tub bench and bedrail for the pt and whether it will be covered by MA. We are waiting for an answer. Pt is also aware that she can purchase these items on [employer]. Pt will be getting reacher, wide sock aide, dressing stick and toilet tongs from us before discharge.     Other Barriers to Discharge (DME, Family Training, etc):  Will likely need:   -bariatric commode overlay for toilet during daytime and to use as BSC for nighttime PRN  -bariatric extended tub bench-toilet aide   -wide sock aide  -reacher    pt has in room sock aide dressing stick , reacher and toilet hygiene aide, leg

## 2023-02-27 NOTE — PLAN OF CARE
Goal Outcome Evaluation:    Orientation: A/O x4  Ambulation/Transfers: Set-up using ww  Bowel/Bladder: Mixed urinary continence  Pain: Ongoing generalized aches, prn x1  Diet/Liquids/Pills: 2gram NA/K diet, 2000mL FR  Tubes/Lines/Drains/Skin:

## 2023-02-27 NOTE — PLAN OF CARE
Goal Outcome Evaluation:      Plan of Care Reviewed With: patient    Overall Patient Progress: improvingOverall Patient Progress: improving    Outcome Evaluation: Advanced to Mod I this shift. Motivated to return to previous living situation.    Orientation: Alert and oriented x4  Bowel: Continent of bowel LBM 2/23  Bladder: Continent of bladder, though can be incontinent per report  Pain: Endorses pain R leg, back. Oxycodone given with some relief per pt.  Ambulation/Transfers: Mod I with walker  Blood sugars: QID and 0200 blood glucose checks with sliding scale insulin with carb coverage  Diet/ Liquids: Regular thin diet, 2 L fluid restriction  Tubes/ Lines/ Drains: CPAP at night

## 2023-02-27 NOTE — PROGRESS NOTES
"  [facility]   Acute Rehabilitation Unit  Daily progress note    INTERVAL HISTORY  Weekend and therapy notes reviewed, no acute events reported.    Pricilla Brown was seen and examined at bedside this morning.  She reports that she is feeling good but very eager to get home as soon as possible.  Notes she is now planning to discharge to her sister's home as she has been unable to progress with stairs.  States right posterior thigh pain has been better overall, though can still flare at times.  Has been rotating ice and heat which has helped.  She is also concerned about numbness/tingling in left ulnar hand and digits 4/5, which has been present throughout this hospital stay.  Notes she initially had this in her right arm as well, but since resolved.  Feels digits 4 and 5 of left hand are weak and ROM restricted as well.  Reviewed discussion she had with weekend provider regarding potential ulnar nerve compression, will trial elbow sleeve/protector and orthotics here to fit her with this today.    Otherwise denies concerns/questions and states her breathing feels \"the best it has been in a long time\".  Denies chest pain, palpitations, dizziness, nausea, bowel or bladder issues.     Functionally, patient is now progressed to mod I in room.  She is able to ambulate mod I with FWW up to 25' with wheelchair follow.  Working on getting appropriate equipment in place, patient would like to discharge as soon as Wednesday if everything is in place.      MEDICATIONS    apixaban ANTICOAGULANT  5 mg Oral BID     atorvastatin  40 mg Oral Daily     bumetanide  1 mg Oral BID     diltiazem ER  120 mg Oral BID     docusate sodium  50 mg Oral BID     gabapentin  600 mg Oral TID     hydrALAZINE  10 mg Oral TID     insulin aspart  1-14 Units Subcutaneous TID AC     insulin aspart  1-11 Units Subcutaneous At Bedtime     insulin aspart   Subcutaneous Daily with breakfast     insulin aspart   " Subcutaneous Daily with lunch     insulin aspart   Subcutaneous Daily with supper     insulin glargine U-300  65 Units Subcutaneous QAM     levothyroxine  200 mcg Oral Once per day on Mon Tue Wed Thu Fri Sat     levothyroxine  400 mcg Oral Weekly     liraglutide  0.6 mg Subcutaneous Daily     menthol (Topical Analgesic) 2.5%   Topical 4x Daily     metoprolol succinate ER  50 mg Oral Daily     miconazole   Topical BID     nicotine  1 patch Transdermal Daily    And     nicotine   Transdermal Q8H OPHELIA     sennosides  8.6 mg Oral BID     umeclidinium  1 puff Inhalation Daily        acetaminophen, albuterol, albuterol, sore throat, capsaicin, clotrimazole, glucose **OR** dextrose **OR** glucagon, diclofenac, docusate sodium, enema compound (docusate/mineral oil/NaPhos) NO MAG CITRATE MIXTURE, hydrALAZINE, hydrOXYzine **OR** hydrOXYzine, insulin aspart, methocarbamol, mineral oil-hydrophilic petrolatum, mineral oil-hydrophilic petrolatum, naloxone **OR** naloxone **OR** naloxone **OR** naloxone, nicotine, oxyCODONE, - MEDICATION INSTRUCTIONS -, simethicone     PHYSICAL EXAM  /73 (BP Location: Right arm, Patient Position: Supine, Cuff Size: Adult Regular)   Pulse 96   Temp 97.8  F (36.6  C) (Oral)   Resp 18   Wt (!) 212.9 kg (469 lb 5.8 oz)   SpO2 96%   BMI 73.51 kg/m     Gen: NAD, lying in bed  Cardio: appears well-perfused  Pulm: non-labored on room air  Abd: obese, soft, non-tender  Ext: lymphedema wraps in place - severe bilateral onychomycosis  Neuro/MSK: awake, alert, moving all extremities in bed, tenderness to palpation at L medial elbow and some weakness at flexor digitorum superficialis and flexor digitorum profundus on the left side for 4th and 5th digits. Also paresthesia at 3-5 digits        LABS  Last Basic Metabolic Panel:  Recent Labs   Lab Test 02/27/23  0206 02/26/23  2152 02/26/23  1858 02/23/23  1557 02/23/23  1155 02/22/23  1742 02/22/23  1631 02/21/23  0753 02/21/23  0643   NA  --   --   --    --  137  --  135*  --  137   POTASSIUM  --   --   --   --  4.3  --  4.3  --  4.7   CHLORIDE  --   --   --   --  100  --  99  --  102   CO2  --   --   --   --  29  --  24  --  27   ANIONGAP  --   --   --   --  8  --  12  --  8   * 119* 99   < > 259*   < > 126*   < > 209*   BUN  --   --   --   --  47.5*  --  52.4*  --  54.8*   CR  --   --   --   --  1.16*  --  1.28*  --  1.45*   GFRESTIMATED  --   --   --   --  58*  --  51*  --  44*   JUSTIN  --   --   --   --  9.2  --  9.6  --  9.2    < > = values in this interval not displayed.     CBC RESULTS:   Recent Labs   Lab Test 02/23/23  1155 02/21/23  0643 02/20/23  1920   WBC 10.4 8.1 8.1   RBC 4.21 4.20 4.27   HGB 12.3 12.1 12.3   HCT 40.1 41.3 41.4   MCV 95 98 97   MCH 29.2 28.8 28.8   MCHC 30.7* 29.3* 29.7*   RDW 16.3* 16.4* 16.6*    251 256       ASSESSMENT AND PLAN  Pricilla Brown is a 48 year old female with a past medical history of chronic HFrEF 2/2 NICM, persistent atrial fibrillation (on Eliquis), asthma, diabetes mellitus type II, hypertension, obesity, hypothyroidism 2/2 radioablation for Graves disease, JYOTI, CKD, and recent admission for HF exacerbation (12/25/22-1/1/23) who was admitted on 1/23/23 with heart failure exacerbation with hospital course complicated by encephalopathy, hypercarbia, NIRAJ, hyperkalemia, labile blood glucose, UTI, dry gangrene of toes on right foot, and pain.  She is now admitted to ARU on 2/14/23 for multidisciplinary rehabilitation and ongoing medical management.        Admission to acute inpatient rehab 02/14/23.    Impairment group code: 09 Cardiac -  heart failure exacerbation       Rehabilitation - continue comprehensive acute inpatient rehabilitation program with multidisciplinary approach including therapies, rehab nursing, and physiatry following. See interval history for updates.        Medical Conditions  New actions/orders/updates for today are in blue.     Acute on Chronic Systolic Heart Failure (EF  40-45%)  Long-Standing Persistent Atrial Fibrllation  Hypertension   Troponin Elevation 2/2 CHF  [PTA meds: apixaban 5 mg BID, atorvastatin 40 mg daily, Bumex 5 mg BID, hydralazine 12.5 mg TID, hydrochlorothiazide 12.5 mg BID, isosorbide dinitrate 20 mg TID, Toprol XL 75 mg daily, spironolactone 50 mg daily]  Pt admitted with c/o increasing SOB, MURRAY, chest tightness, weight gain in spite of compliance with PO diuretics. NT BNP 5891 (was 2030 during most recent admission).  Lactic acid 2.2 on admission with improvement to 1.1 with diuresis. Troponin mildly elevated, 67-->75-->60 likely 2/2 CHF exacerbation; no concern for ACS currently. Most recent Echo with EF 40-45%. Coronary angiogram 2012 with no significant CAD. Of note, pt recently admitted 12/25-1/1 HF exacerbation, diuresed to 472lbs. Pt weight on admission 1/23 555lbs and down to 450lbs on discharge to ARU.  Vitals:    02/22/23 0700 02/24/23 0400 02/25/23 0700 02/26/23 0700   Weight: (!) 223.8 kg (493 lb 6.4 oz) (!) 205 kg (452 lb) (!) 219.1 kg (483 lb 1.6 oz) (!) 219.1 kg (483 lb 0.5 oz)    02/27/23 0500   Weight: (!) 212.9 kg (469 lb 5.8 oz)   - Continue Bumex 1 mg BID  - Per cardiology, suspect EDW is around 445-450 lbs.  Monitor daily weights.  Call [facility] clinic if weight increases 5 lbs in a day two days in a row or 10 lbs in 1 week.  Weight remains variable and not very accurate  - Continue I/O, low Na diet, 2L fluid restriction   - BB: Continue Toprol XL 50 mg daily  - ACEi/ARB/ARNI: Discontinued Lisinopril d/t allergy, if BP elevated, can trial Losartan    - SGLT2i: Contraindicated with recurrent yeast infections   - Aldosterone Antagonist: Discontinued Aldactone due to NIRAJ, hyperkalemia  - Rate Control: BB as above. Given mildly reduced EF, ok to use Diltiazem 120 mg BID  - Anticoagulation: CHADSVASC 3; Continue PTA apixaban  - Afterload reduction: Continue hydralazine 10 mg TID (hold if SBP <110), can give additional PRN dose if SBP >180  - [facility]  "consulted; follow up after ARU discharge.  Plan to discuss CardioMEMS at this appointment.  Will need to be rescheduled from 2/24.     Chronic Kidney Disease Stage 3b  NIRAJ, improving  Longstanding history of diabetes and HTN. Baseline Cr ~1.2-1.4. Cr on admission 1.56. During hospitalization, patient diuresed to 427 lbs with NIRAJ, Cr peak 2.1. After backing off on diuretics, Cr improved to 1.4 on 2/14 at admission to ARU  - Avoid nephrotoxic agents  - Resume diuretics as listed above  - Trend BMP every M/Th.  2/27: Cr stable at 1.21     Hyperkalemia, resolved  Potassium continually rising since 2/9 with peak 2/11 @ 7.3.  EKG without acute changes, rhythm stable in a fib.  Nephrology consulted.  Potassium shifted with insulin, calcium gluconate for cardiac stability, lokelma 15 g X 1, IV Bumex total of 6 mg on 2/11/23.  Spironolactone discontinued.  - Low K diet  - I/O  - Continue to hold spironolactone, per nephrology can consider resuming once Cr stable  - Diuresis as above  - Trend BMP every M/Th.  2/27: K stable/WNL at 4.7      Hyponatremia, resolved  In setting of aggressive diuresis.  Now mild, improved to 134 on 2/14.  - Trend BMP every M/Th.  2/27: Na stable/WNL at 138      Hypothyroidism 2/2 below  Graves Disease s/p Radioiodine Ablation  TSH 20.4 and free T4 0.82 on admission. Endocrine consulted.  - Continue levothyroxine 200 mcg M-Sat + 400 mcg on Sunday.    - Recheck TFT in a month     Type 2 Diabetes Mellitus, insulin-dependent, uncontrolled  Hyperglycemia  Diabetic neuropathy  A1c 13.5% on 12/25/2022. Discrepancies in patient's home regimen.  Per endo, PTA on \"Toujeo 225 units daily, novolog 100 units with each meal + CS, and ozempic 1 mg weekly. She could not tolerate empaglifozin due to frequent UTIs and metformin due to GI side effects.\" Endocrinology consulted during admission.  Insulin gtt started 2/1 per endocrinology, transitioned back to subcutaneous lantus and novolog on 2/3/23.   Recent Labs "   Lab 02/27/23  0206 02/26/23  2152 02/26/23  1858 02/26/23  1726 02/26/23  1233 02/26/23  0800   * 119* 99 75 224* 251*   - Monitor BG TIC AC + HS + 0200  - Endocrinology continuing to follow, appreciate assistance.  Per their recs on 2/27:      - Continue Tuojeo 65 units qAM (switched from Lantus on 2/26)  - Increase Victoza to 1.2 mg daily tomorrow (in place of PTA Ozempic due to formulary)  - Continue Novolog custom (1/20) sliding scale insulin TID AC and HS  -  Decrease Novolog to 1:5g CHO with breakfast, lunch, dinner and snacks (except 0200 snack, which can be given without coverage if concerned about low BG overnight)   - Hypoglycemia protocol  - OP follow up Thelma Archibald/Dr Silva Damon, scheduled on 3/17     Necrotic wounds of Right Toes 1, 3, and 4, resolved  Present on admission, pt unaware how long wounds have been present.  Vascular consulted; JOSETTE's completed showing patent vessels and normal waveforms. They recommend orthopedic foot and ankle consult.  Ortho consulted, felt consistent with dry gangrene, plan for wound care and follow-up as outpatient.   WOCN following as inpatient and provided wound care recs.  - WOCN consulted at ARU to continue to follow, appreciate assistance.  WOCN assessed toes on 2/15, debrided devitalized skin from tips of digits 1 and 4, intact skin beneath with no open wounds, no evidence of dry gangrene.  Did lose toenails on those digits.  - Wound cares updated per WO recs: Bilateral toes and feet: Wash daily with warm water, soap and a washcloth, taking particular attention to wash well between and under toes. Dry thoroughly with a clean towel. Moisturize skin on bilateral feet and legs with S[place] 24. [place] missing toenail beds on great and 4th toes with betadine.  Also needs consistent foot cares.  - WBAT  - Podiatry consulted 2/26 per nursing's concerns for toenails (onychomycosis) and skin issues.  - Follow up with podiatry as outpatient (rescheduled from  "2/17 to 3/3 with Dr. Leahy given still anticipate at ARU).     Urinary Retention  Phan catheter placed on admission for strict I/O while aggressively diuresing. Removed 2/11, pt with urinary retention and straight cath x 3, Phan replaced. Urology curbside, continue phan for now, can re-attempt voiding trial as OP.  Per patient request, with improving transfers to facilitate toileting needs, phan removed on 2/17 for trial of void.  Seems to be voiding well.  - If ongoing retention or phan replaced, follow up with OP Urology    E coli UTI, treated  UA on 2/19 with large leuk, pyuria, bacteriuria.  Started on empiric ciprofloxacin.  UC with >100k colonies E coli, susceptible to cipro.  Has now completed 5-day course of cipro 500 mg BID (2/19-2/26)     Asthma  Patient endorsing mouth irritation w/ inhaler use. Per inpatient team, roof of mouth erythematous and appears excoriated but w/o evidence of yeast.   - Continue PTA inhalers (levalbuterol and spiriva - substitute by Incruse Ellipta)  - Magic mouthwash    Tobacco use disorder  - Continue nicotine patch 14 mcg, started during recent (December) admission  - PRN nicorette gum added 2/20 for breakthrough cravings     JYOTI  - Continue BiPAP at night  - Patient reports that she has not been using BiPAP due to issues with mask, instead using oxygen with sleep.  RT met with patient on 2/20, but patient declined all alternative mask choices and expressed preference to continue nasal cannula with sleep while here instead.    - RT met with patient again on 2/21.  Patient feels hospital mask too small despite largest size available.  Recommended to bring home equip to use but patient states this is \"not possible\".  - 2/22 ABG was drawn and reviewed; patient tried BiPAP again per hospitalist team recs and encouragement.     Hyperlipidemia   - Continue PTA Lipitor 40 mg daily     Acute low back pain with bilateral radiculopathy, improving  Chronic Pain  [PTA meds: Tylenol " PRN, diclofenac QID PRN, gabapentin 600 mg TID, oxycodone 5 mg 5x/day]  Of note, per PDMP #140 tabs of oxycodone 5 mg were filled on 2/16 while patient hospitalized.  Patient reporting diffuse joint pain while inpatient. Using narcotics along with gabapentin, topical nsaids, and muscle relaxants. Aggressive diuresis vs diabetic neuropathy.  Pain management curbside consulted 1/31 while inpatient; decrease gabapentin to BID, stopped flexeril and added robaxin.  Pain improved initially at ARU admission but significantly increased pain in bilateral low back radiating to bilateral buttocks and posterior legs to feet.  Decreased with ambulation, but increased with any movement in bed.  Lumbar/thoracic xrays with grossly maintained vertebral height, no evidence of fracture.  Right thigh and hip xray with no evidence of fracture, mild degenerative changes.  US RLE 2/19 negative for DVT. US of soft tissue showed no fluid collection identified to suggest abscess; mild subcutaneous edema. As of 2/22 localized posterior right thigh pain seems more MSK related and secondary to severe cramps/trigger point. No underlying medical / electrolyte abnormality that is contributing at this point. Deconditioning and disuse atrophy are likely contributing.    - Continue PRN Tylenol, voltaren (feet - not using), capsaicin (feet - not using), robaxin, and hydroxyzine. Continue stretching and modalities.  - Resumed PTA gabapentin 600 mg TID on 2/21  - Continue warm pack, Bengay QID, TENS  - Topamax 25 mg daily discontinued on 2/24 due to possible side effects (hand tremors)  - Continue oxycodone 5 mg q6h PRN  - Have also discussed with Dr. Velazquez to continue working on relaxation techniques and coping with chronic pain.    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 Symptoms were worse that day as she tried to catch  "herself in the bathroom the day before. Exam suggestive of ulnar neuropathy at the elbow; talked to PT and nursing about elbow support when sitting in chair.   - L elbow sleeve with padding to be used during the day.   - Consider NCS/EMG as outpatient for more eval.     Adjustment disorder with depressed mood  Clinical psychology evaluation on February 22.  Appreciate assistance; see consult note for more details.     Morbid obesity  Estimated body mass index is 73.51 kg/m  as calculated from the following:    Height as of 1/23/23: 1.702 m (5' 7\").    Weight as of this encounter: 212.9 kg (469 lb 5.8 oz).  - Complicates rehab/cares    Possible COVID-19 exposure  Per ID, patient was previously on unit with high prevalence of COVID-19.  Tested negative on 2/15.  Infection prevention recommending repeat testing to confirm negative at 5-7 days.  Ordered on 2/16 by ID provider; patient declining despite education.  Afebrile, no signs/symptoms of COVID infection.  COVID PCR negative on 2/20.        1. Adjustment to disability:  Clinical psychology consulted 2/22 as above  2. FEN: mod cons CHO, 2g NA, 2g K, 2L fluid restriction  3. Bowel: continent, monitor, PRN bowel meds available  4. Bladder: phan removed on 2/17 for TOV as above  5. DVT Prophylaxis: apixaban  6. GI Prophylaxis: not indicated  7. Code: full  8. Disposition: goal for home  9. ELOS:  10-14 days  10. Follow up Appointments on Discharge: PCP in 1-2 weeks, cardiology ([facility], need to reschedule from 2/24), endocrinology (Thelma Archibald/Dr Silva Damon on 3/17), urology, ortho/podiatry (3/3 with Dr. Leahy)      Patient discussed with Dr. Vladislav Vazquez, PM&R staff physician and FREDDY Salomon ADALBERTO    Lulu Guerrero PA-C  Physical Medicine & Rehabilitation    "

## 2023-02-27 NOTE — PLAN OF CARE
"Discharge Planner Post-Acute Rehab PT:      Discharge Plan: Planning to discharge to family/friends place without stairs to enter + resumed PCA services. Home care PT.     Precautions: falls     Current Status:  Bed Mobility: Mod I with rails  Transfer: Mod I with FWW  Gait:  Mod I with FWW in room. Up to ~25 ft with WC follow  Stairs: 4 inch step up in bars - CGA   Balance: Requires at least single UE support in standing     Assessment:  Pt noting R hamstring pain>>function to be improved with yesterday's alternating heat and ice; encouraged to continue with today for further relief. Demonstrating much greater ease with 4\" step ups in // bars, but not yet able to progress to 6\", noting weakness and fear to be limiting, and agrees discharging to another family/friend's place without stairs to be the most reasonable. Updated to mod I with FWW in room, but will need to assess safety with 4WW, as this is what she has for home.     Other Barriers to Discharge (DME, Family Training, etc):   Stairs to enter home- pt planning to discharge to family/friends place without stairs  Pt's SO to measure step height, bed height  Pt has 4WW  "

## 2023-02-28 NOTE — PLAN OF CARE
"Rehabilitation Medicine Commode Face to Face Note     Diagnosis: Heart Failure Exacerbation.  Current height: 5'7\"  Current weight:469 lbs 5.8 ounces  Hip width: 52 inches  Leg length in sitting from (back of buttock to behind knee): 26 inches  : 1975     Current transfer status: Mod I FWW  Current Toileting status: Mod I FWW  Current Mobility Status: Mod I FWW, short distance.    Patient will use a drop down arm commode for scheduled bowel/bladder program daily. Patient has been using this type of commode during acute rehab stay with success. Due to patient's body habitus, weight capacity and variable fatigue pt would benefit from a commode for over the toilet use in combination w/ using it as a bedside drop down commode for night time use. Pt's body habitus is too wide for use w/ a regular commode and will likely not be able to safely sit in a regular commode therefore a drop down bariatric commode is recommended. Pt is using this type of commode at the acute rehab center w/ success.     This equipment will allow them to be as IND as possible with toileting and reduce caregiver burden, it will be part of a fall prevention plan for safe mobility.      Patient has family and PCA services that will be available to assist patient as needed w/ use of equipment.     Arm and leg strength: UE strength and trunk control adequate to sit in a bedside commode.     *Patient requires an extra wide or heavy duty drop down arm commode chair due to weight of more than 300lbs or patient's body stature requires extra width.     Length of need: Indefinitely    Vladislav Vazquez DO  NPI:  [id number]    Signature:  Date:                            "

## 2023-02-28 NOTE — DISCHARGE INSTRUCTIONS
PCP Primary Care Provider  You are scheduled to see Dr. Lockhart on March 8 2023 at 1:20 pm.    Address  [address] [place] [address]  Phone   [phone]    Cardiology  You are scheduled to see YOCASTA Faustin CNP on April 5 2023 at 1:30 pm.    Address  [address] [place] [address]  Phone   [phone]    Endocrinology  You are scheduled to see Dr. Damon on March 17 2023 at 11:35 am.    Address  [address] [place] [address]  Phone   [phone]    Ortho/Podiatry  You are scheduled to see Dr. Leahy on March 3 2023 at 10:05 am.    Address  [address] [place] [address]  Phone   [phone]    Urology  You are scheduled to see *** on *** at ***.  A  will call you to set this appointment up.    Address  [address] [place] [address]  Phone   [phone]      [facility]  [address] [place] [address], [address]  [place] [address]  Ph: [phone]    [facility] will be providing PT/OT/RN but unable to start services until Tuesday, 3/7.       Diabetes Treatment Plan for Discharge:    (no Jardiance, due to UTI history)   -Jvojeo: 65 units daily AM    -resume Ozempic 1 mg once weekly (substituted Victoza here) Start this on the morning of the first day  home   -Novolog 11 units with a meal, zero units if only having a Premier shake under 10 grams carb, and 4 units for  snacks   -Novolog sliding scale   Pre-meal Novolog scale   to 159 give 1 units.    to 179 give 2 units.    to 199 give 3 units.    to 219 give 4 units.    to 239 give 5 units.    to 259 give 6 units.    to 279 give 7 units.    to 299 give 8 units.    to 319 give 9 units.    to 339 give 10 units.    to 359 give 11 units.    to 379 give 12 units.   to 399 give 13 units.  BG >/=400 give 14  units.    Bedtime Novolog scale   to 219 give 1 units.    to 239 give 2 units.    to 259 give 3 units.    to 279 give 4 units.    to 299 give 5 units.    to 319 give 6 units.    to 339 give 7 units.    to 359 give 8 units    to 379 give 9 units.   to 399 give 10 units.  BG >/=400 give 11 units.      Outpatient follow up: with [facility] - has appt 3/17/23

## 2023-02-28 NOTE — PROGRESS NOTES
PSYCHOLOGY PROGRESS NOTE    Start time: 945  Stop Time: 1003  Total Duration in Minutes: 18 minutes    SUBJECTIVE:  Pricilla Brown was seen today for a follow-up visit.  She appeared more motivated for psychological intervention today than last week. Pricilla talked about her progress towards her goals, and how she is extremely motivated to discharge to be outside.     Symptoms reported: restlessness and demoralization related to a lengthy hospital stay.      Progress toward goals: fair.     Interventions utilized: reflective listening and validation, thought reframing, and value-based actions.     OBJECTIVE: Pricilla was sitting upright in her wheelchair.  Thoughts were goal-directed and linear, and mood appeared consistent with anxious/eager mood.  Conversation focused on return-to-home and discharge planning.  It was hard to provide interventions and thought reframing given her focus.      ASSESSMENT:  Pricilla appears restless and agitated - particularly as we near the end of her hospital stay.  She is highly motivated to return to home and to make whatever discharge plan work.  She tends to focus on one aspect, thought, or emotion and it's hard to provide thought restructuring or evidence to the contrary.     DIAGNOSIS: Adjustment disorder with depressed mood.     PLAN: Plan for psychology to follow this patient approximately once per week for the duration of their rehabilitation stay.     Agnes Velazquez PsyD, LP  Clinical Neuropsychologist

## 2023-02-28 NOTE — TELEPHONE ENCOUNTER
Health Call Center    Phone Message    May a detailed message be left on voicemail: yes     Reason for Call: JR from [facility] calling to schedule patient for a follow up for urinary retention. Please call patient on 3/1/23 in the afternoon as she will be discharged by then. Thank you    Action Taken: Message routed to:  [facility] URo    Travel Screening: Not Applicable

## 2023-02-28 NOTE — PLAN OF CARE
Goal Outcome Evaluation:      Plan of Care Reviewed With: patient    Overall Patient Progress: improvingOverall Patient Progress: improving    Outcome Evaluation: pt mod I. cont of B&B. pt c/o back pain, PRN tylenol and oxy given with relief. Fluid restriction. Calls appropriately. foot care completed.    Pt A&Ox4. Mod I. Bipap present at NOC.

## 2023-02-28 NOTE — PROGRESS NOTES
IP Diabetes Management  Daily Note           Assessment and Plan:   HPI: Pricilla Brown is a 48 year old female with a past medical history of chronic HFrEF 2/2 NICM, persistent atrial fibrillation (on Eliquis), asthma, diabetes mellitus type II, hypertension, obesity, hypothyroidism 2/2 radioablation for Graves disease, JYOTI, CKD, and recent admission for HF exacerbation (12/25/22-1/1/23) who was admitted on 1/23/23 with heart failure exacerbation with hospital course complicated by encephalopathy, hypercarbia, NIRAJ, hyperkalemia, labile blood glucose, UTI, dry gangrene of toes on right foot, and pain.  She is now admitted to ARU on 2/14/23 for rehabilitation.     Assessment:   1)Type II Diabetes Mellitus, uncontrolled (A1c 13.5%), with insulin resistance.    Plan:    -Tuojeo (glargine U300)-65 units AM.    -Victoza 0.6 mg daily AM - increased  to 1.2 mg    -Novolog reduced 1:5g CHO with meals, snacks, supplements,    -new snack admin instructions to cover overnight snack if 30 grams carb or more   -Novolog custom 1/20 intensity sliding scale TID AC and HS   -BG monitoring TID AC, HS, 0200                 -hypoglycemia protocol                 -carb counting protocol                 -diabetes education needs will be assessed closer to discharge.      Diabetes Treatment Plan for Discharge:    (no Jardiance, due to UTI history)   -Tuojeo: 65 units daily AM    -resume Ozempic 1 mg once weekly (substituted Victoza here) Start this on the morning of the first day  home   -Novolog 11 units with a meal, zero units if only having a Premier shake under 10 grams carb, and 4 units for  snacks   -Novolog sliding scale   Pre-meal Novolog scale   to 159 give 1 units.    to 179 give 2 units.    to 199 give 3 units.    to 219 give 4 units.    to 239 give 5 units.    to 259 give 6 units.    to 279 give 7 units.    to 299 give 8 units.    to 319 give 9 units.    to 339 give  "10 units.    to 359 give 11 units.    to 379 give 12 units.   to 399 give 13 units.  BG >/=400 give 14 units.    Bedtime Novolog scale   to 219 give 1 units.    to 239 give 2 units.    to 259 give 3 units.    to 279 give 4 units.    to 299 give 5 units.    to 319 give 6 units.    to 339 give 7 units.    to 359 give 8 units    to 379 give 9 units.   to 399 give 10 units.  BG >/=400 give 11 units.      Outpatient follow up: with [facility] - has appt 3/17/23  Discussed w/ pt and paged primary team.  Diabetes service will sign off, with discharge plan in place now.      Interval History and Assessment: interval glucose trend reviewed:     Pricilla relays a shift in eating pattern that preceded the morning high BG today.  Had her snack late and not covered with aspart.  Knows coverage will improve the trend.  Asks about covering her Premier shake which is lower in carbs.  Discussed victoza versus ozempic and agreed on Ozempic for greater weight loss and fewer injections.  Explained insulin and low BG risk, but Ozempic not causing low BG on it's own.    Pricilla feels okay w/ plan to change snack threshold for coverage.  Excited about home tomorrow and would like treatment plan in writing (pasted into AVS for her).        Current nutritional intake and type: Orders Placed This Encounter      Combination Diet 2 gm K Diet; Moderate Consistent Carb (60 g CHO per Meal) Diet; 2 gm NA Diet      PTA Diabetes Regimen:   Tuojeo 40 unit(s) once daily at HS  Aspart 10 units with each meal + additional correction scale insulin as follows based on pre-meal glucose      -140 - 160: + 1 unit     -161 - 180: + 2 units     -181 - 200: + 3 units, and so on (sensitivity of 20 mg/dL)     Ozempic 1 mg weekly - she reports she did NOT resume as instructed     BG monitoring frequency:  3-4 times per day  BG trends at home: \"all over\" but mostly between 85 - " 300      Historical:   Lionel (glargine U300) 225 units once daily  Novolog 100  Units with meals, plus 1/15 correction scale  Trulicity 1mg weekly on Mondays  Jardiance 25 mg daily AM - stopped 2/2 UTI's   (has not tolerated Metformin in the past)    Discharge Planning: per patient tomorrow 3/1           Diabetes History:   Type of Diabetes: Type 2 Diabetes Mellitus  Lab Results   Component Value Date    A1C 13.5 12/25/2022    A1C >15.0 07/23/2021    A1C >14.0 08/19/2020    A1C 8.4 10/11/2018    A1C 9.6 05/29/2018    A1C 9.0 04/07/2017    A1C 12.1 03/10/2016              Review of Systems:     The Review of Systems is negative other than noted in the Interval History.           Medications:     Current Facility-Administered Medications   Medication     acetaminophen (TYLENOL) tablet 650 mg     albuterol (PROVENTIL HFA/VENTOLIN HFA) inhaler     albuterol (PROVENTIL) neb solution 2.5 mg     apixaban ANTICOAGULANT (ELIQUIS) tablet 5 mg     atorvastatin (LIPITOR) tablet 40 mg     benzocaine-menthol (CHLORASEPTIC) 6-10 MG lozenge 1 lozenge     bumetanide (BUMEX) tablet 1 mg     capsaicin (ZOSTRIX) 0.025 % cream 1 applicator     clotrimazole (LOTRIMIN) 1 % cream     glucose gel 15-30 g    Or     dextrose 50 % injection 25-50 mL    Or     glucagon injection 1 mg     diclofenac (VOLTAREN) 1 % topical gel 2 g     diltiazem ER (CARDIZEM SR) 12 hr capsule 120 mg     docusate sodium (COLACE) capsule 100 mg     docusate sodium (COLACE) capsule 50 mg     enema compound (docusate/mineral oil/NaPhos) NO MAG CITRATE MIXTURE     gabapentin (NEURONTIN) capsule 600 mg     hydrALAZINE (APRESOLINE) tablet 10 mg     hydrALAZINE (APRESOLINE) tablet 10 mg     hydrOXYzine (ATARAX) tablet 25 mg    Or     hydrOXYzine (ATARAX) tablet 50 mg     insulin aspart (NovoLOG) injection (RAPID ACTING)     insulin aspart (NovoLOG) injection (RAPID ACTING)     insulin aspart (NovoLOG) injection (RAPID ACTING)     insulin aspart (NovoLOG) injection (RAPID  ACTING)     insulin glargine U-300 (TOUJEO) injection 65 Units     levothyroxine (SYNTHROID/LEVOTHROID) tablet 200 mcg     levothyroxine (SYNTHROID/LEVOTHROID) tablet 400 mcg     liraglutide (VICTOZA) injection 1.2 mg     menthol (Topical Analgesic) 2.5% (BENGAY VANISHING SCENT) 2.5 % topical gel     methocarbamol (ROBAXIN) tablet 500 mg     metoprolol succinate ER (TOPROL XL) 24 hr tablet 50 mg     miconazole (MICATIN) 2 % powder     mineral oil-hydrophilic petrolatum (AQUAPHOR)     mineral oil-hydrophilic petrolatum (AQUAPHOR)     naloxone (NARCAN) injection 0.2 mg    Or     naloxone (NARCAN) injection 0.4 mg    Or     naloxone (NARCAN) injection 0.2 mg    Or     naloxone (NARCAN) injection 0.4 mg     nicotine (NICODERM CQ) 14 MG/24HR 24 hr patch 1 patch    And     nicotine Patch in Place     nicotine (NICORETTE) gum 2 mg     oxyCODONE (ROXICODONE) tablet 5 mg     Patient is already receiving anticoagulation with heparin, enoxaparin (LOVENOX), warfarin (COUMADIN)  or other anticoagulant medication     sennosides (SENOKOT) tablet 8.6 mg     simethicone (MYLICON) chewable tablet 80 mg     umeclidinium (INCRUSE ELLIPTA) 62.5 MCG/ACT inhaler 1 puff     Facility-Administered Medications Ordered in Other Encounters   Medication     sodium chloride (PF) 0.9% PF flush 10 mL            Physical Exam:    /75 (BP Location: Right arm)   Pulse 93   Temp 97.5  F (36.4  C) (Oral)   Resp 16   Wt (!) 212.9 kg (469 lb 5.8 oz)   SpO2 96%   BMI 73.51 kg/m      GENERAL : In no apparent distress, up in chair  HEENT: hearing intact  RESP: normal respiratory effort. No cough  PSYCH: calm interaction   NEURO: awake, alert, responds appropriately          Data:     Recent Labs   Lab 02/28/23  1221 02/28/23  0820 02/28/23  0049 02/27/23  2119 02/27/23  1634 02/27/23  1259   * 273* 196* 223* 99 125*     Lab Results   Component Value Date    WBC 8.0 02/27/2023    WBC 8.4 02/27/2023    WBC 10.4 02/23/2023    HGB 12.0  02/27/2023    HGB 13.0 02/27/2023    HGB 12.3 02/23/2023    HCT 39.1 02/27/2023    HCT 41.6 02/27/2023    HCT 40.1 02/23/2023    MCV 95 02/27/2023    MCV 93 02/27/2023    MCV 95 02/23/2023     02/27/2023     02/27/2023     02/23/2023     Lab Results   Component Value Date     02/27/2023     02/27/2023     02/23/2023    POTASSIUM 4.7 02/27/2023    POTASSIUM 5.7 (H) 02/27/2023    POTASSIUM 4.3 02/23/2023    CHLORIDE 101 02/27/2023    CHLORIDE 101 02/27/2023    CHLORIDE 100 02/23/2023    CO2 29 02/27/2023    CO2 23 02/27/2023    CO2 29 02/23/2023     (H) 02/28/2023     (H) 02/28/2023     (H) 02/28/2023     Lab Results   Component Value Date    BUN 48.4 (H) 02/27/2023    BUN 48.4 (H) 02/27/2023    BUN 47.5 (H) 02/23/2023     Lab Results   Component Value Date    TSH 20.40 (H) 01/23/2023    TSH 14.30 (H) 12/25/2022    TSH 6.79 (H) 09/09/2021     Lab Results   Component Value Date    AST 24 02/22/2023    AST 30 02/19/2023    AST 37 (H) 01/29/2023    ALT 17 02/22/2023    ALT 18 02/19/2023    ALT 11 01/29/2023    ALKPHOS 122 (H) 02/22/2023    ALKPHOS 122 (H) 02/19/2023    ALKPHOS 97 01/29/2023       50 minutes spent on the date of the encounter doing chart review, history and exam, documentation and further activities per the note        Contacting the Inpatient Diabetes Team   From 8AM-4PM: page inpatient diabetes provider that is following the patient, or utilize the job code paging system.   From 4PM-8AM: page the job code for endocrine fellow on call.       Please use the following job code to reach the Inpatient Diabetes team. Note that you must use an in house phone and that job codes cannot receive text pages.     Dial [phone] (or star-star-star [phone] on the new [employer] telephones), then [phone] to reach the endocrine-diabetes provider on call.    Regina Monreal APRN [phone]

## 2023-02-28 NOTE — PROGRESS NOTES
Pt anticipates on discharging home tomorrow (3/1) with home care PT/OT/RN. Pt's PCA plans to pick her up tomorrow around 1100 to transport pt to sister's home. Pt discharging to sisterBob's home at [address] [place] [address].     MARGO met with pt at bedside to discuss discharge plan and get sister's address. Pt reported planning to discharge to sisterBob's (Bob ph: [phone]) home. Pt reported that her PCA plans to pick her up tomorrow around 1100 to transport her to her sister's home. Pt did not know sister's exact address and requested this SW call her sister to get the address. MARGO explained that SW would be working on getting pt home care. MARGO also went over discharge IRF and IMM with pt. Pt requested a copy of the IMM.    MARGO called Bob (ph: [phone]) to find out her address. MARGO spoke with Bob's , Riaz who reported that Bob was not home but would be later in the day. Riaz confirmed that pt would be d/cing to their home and provided SW with their address ([address] [place] [address]).     MARGO made referral via [employer] to [facility] for PT/OT/RN. MARGO noted address that pt would be d/cing to.     ADDENDUM 1500: SW received note back from [facility] that they could accept pt but could not do SOC until 3/7.     MARGO spoke with pt at bedside about [facility] not being able to start cares until 3/7. MARGO offered to make referrals to other agencies to see if they could provide SOC sooner. Pt reported that she was fine with the delayed SOC of 3/7. MARGO also provided pt with a copy of IMM as pt requested. Pt had no other questions for MARGO at this time.    MARGO sent note back to [facility] via [employer] stating that SOC 3/7 was fine. MARGO wrote note via Epic on Treatment Team sticky note requesting provider put that a delayed SOC of 3/7 was fine in the discharge orders. MARGO put [facility] information in pt discharge instructions.    MARGO called pt JANINA  Marta GARCIA (ph: [phone]) to update her on the discharge plan. SW left VM for Marta informing her of pt discharge tomorrow and home care services set up for pt.     Sari Yoon, [facility]   [facility]

## 2023-02-28 NOTE — PLAN OF CARE
Discharge Planner Post-Acute Rehab PT:      Discharge Plan: Planning to discharge to family/friends place without stairs to enter + resumed PCA services. Home care PT.     Precautions: falls     Current Status:  Bed Mobility: Mod I with rails  Transfer: Mod I with FWW  Gait:  Mod I with FWW in room. Up to ~25 ft with WC follow  Stairs: 4 inch step up in bars - CGA   Balance: Requires at least single UE support in standing     Assessment: Pt in good spirits today with discharging tomorrow to sister's home. Trialed a 4WW but pt unable to safely take steps d/t increased fear of feeling more unsteady compared to FWW, will order FWW for discharge. Pt continues to report increased R hamstring pain with activity. Educated and assisted pt through ulnar nerve glides to help with L hand pain and improving  control. Completed independence day tasks, plan for pt discharge to sister's home tomorrow (3/1).       Other Barriers to Discharge (DME, Family Training, etc):   Discharge to sister's home (no stairs)  Pt owns 4WW, ordered FWW for discharge

## 2023-02-28 NOTE — PLAN OF CARE
Physical Therapy Discharge Summary    Reason for therapy discharge:    Discharged to home with home therapy.    Progress towards therapy goal(s). See goals on Care Plan in [facility] electronic health record for goal details.  Goals partially met.  Barriers to achieving goals:   ongoing fatigue and intermittent pain.    Bed Mobility: Mod I with rails  Transfer: Mod I with FWW  Gait:  Mod I with FWW in room. Up to ~25 ft with WC follow  Stairs: 4 inch step up in bars - CGA   Balance: Requires at least single UE support in standing    Therapy recommendation(s):    Continued therapy is recommended.  Rationale/Recommendations:  To address functional strength, endurance, and balance impairments, for improved safety and IND with functional mobility, ADLs, and IADLs and to reduce risk of future falls. Pt has been issued a bariatric FWW.  .

## 2023-02-28 NOTE — PLAN OF CARE
Goal Outcome Evaluation:    Pt is alert and oriented x4, denies fever, chills, chest pain, SOB, N/V, abdominal pain, or new weakness/numbness/tingling. Bowel sounds are active with LBM 2/23. Continent of bowel and bladder, joni  ww to transfer but pt is requesting assistance with repositioning in bed. Reported pain in back and legs and received prn robaxin, oxycodone and tylenol. Sleeping off and on through out the night. Pt was found eating snacks BG at this time was 196, no tubes, lines or drains, vs stable, no further care concerns at this time continue with POC.

## 2023-02-28 NOTE — PROGRESS NOTES
Diabetes Treatment Plan for Discharge:    (no Jardiance, due to UTI history)   -Lionel: 65 units daily AM    -resume Ozempic 1 mg once weekly (substituted Victoza here) Start this on the morning of the first day  home   -Novolog 11 units with a meal, zero units if only having a Premier shake under 10 grams carb, and 4 units for  snacks   -Novolog sliding scale   Pre-meal Novolog scale   to 159 give 1 units.    to 179 give 2 units.    to 199 give 3 units.    to 219 give 4 units.    to 239 give 5 units.    to 259 give 6 units.    to 279 give 7 units.    to 299 give 8 units.    to 319 give 9 units.    to 339 give 10 units.    to 359 give 11 units.    to 379 give 12 units.   to 399 give 13 units.  BG >/=400 give 14 units.    Bedtime Novolog scale   to 219 give 1 units.    to 239 give 2 units.    to 259 give 3 units.    to 279 give 4 units.    to 299 give 5 units.    to 319 give 6 units.    to 339 give 7 units.    to 359 give 8 units    to 379 give 9 units.   to 399 give 10 units.  BG >/=400 give 11 units.      Outpatient follow up: with [facility] - has appt 3/17/23

## 2023-02-28 NOTE — PLAN OF CARE
Discharge Planner Post-Acute Rehab OT:     Discharge Plan: home with pca 10 hrs during the day and 4 at night    Precautions: falls, bariatric equipment required    Current Status:  ADLs:    Mobility: MOD I    Grooming: IND seated w/ set up.    Dressing:  UB: set UP. LB- with extra time while sitting on commode over toilet with use of reacher able to get both legs into shorts and up legs max a to adjust over hips.standing at fww.     Feet- socks pt able to don after set up of sock aide no instruction needed    Bathing: pt able towash 8/10 areas assist with feet and buttocks. dep liko to rollling shower chair  Toileting: Mod I clothing management/tongs  Vision/Cognition: Anxious. assess higher cog     Assessment: AM session: Pt made MOD I in room and transfers per PT yesterday. Pt feeling better today. Anxious about equipment for home.  Pending approval w/ commode and tub bench this afternoon w/ MA coverage. Pt declined to attempt shower this AM, but reported she would do a sponge bath before discharge home tomorrow.     -30 mins meds/breakfast    PM session: PCA Guillermou was present and educated on DME needs for home. PCA denied any training, reporting he has experience physically assisting pt at home. Pt's bariatric tub bench and bariatric commode will come by 10 a.m. tomorrow, prior to pt leaving the hospital.     Other Barriers to Discharge (DME, Family Training, etc):  Will likely need:   -bariatric commode overlay for toilet during daytime and to use as BSC for nighttime PRN  -bariatric extended tub bench-toilet aide   -wide sock aide  -reacher  -patient has access to a gait belt  pt has in room sock aide dressing stick , reacher and toilet hygiene aide, leg

## 2023-02-28 NOTE — CARE CONFERENCE
Acute Rehab Care Conference/Team Rounds      Type: Team Rounds    Present: Dr. Vladislav Vazquez, Dr. Agnes Velazquez Health Psychologist, Akhil Campbell PT, Roslyn Harden OT, Merline Ayala [facility], Janie Richard RN, Jacklyn Whitmore RD, and Pricilla Brown Patient.       Discharge Barriers/Treatment/Education    Rehab Diagnosis: debility post cardiac issues     Active Medical Co-morbidities/Prognosis:     Patient Active Problem List   Diagnosis Code     Other chronic pulmonary heart diseases I27.89     Dilated cardiomyopathy (H) I42.0     Morbid obesity (H) E66.01     Diabetes mellitus without complication (H) E11.9     Chronic diastolic heart failure (H) I50.32     Chronic anticoagulation for a-fib Z79.01     JYOTI (obstructive sleep apnea) CPAP Min Pressure of 13 and Max Pressure of 18 G47.33     Hypothyroidism following radioiodine therapy E89.0     SOB (shortness of breath) R06.02     Azotemia R79.89     Asthma J45.909     Peritonsillar abscess J36     Plantar fasciitis M72.2     Neuropathic pain of lower extremity M79.2     Atrial fibrillation (H) [I48.91] I48.91     Long-term (current) use of anticoagulants [Z79.01] Z79.01     Cellulitis L03.90     Chest pain R07.9     Foot pain M79.673     Class 3 obesity (H) E66.01     Thyrotoxicosis E05.90     Respiratory abnormality R06.9     Personal history of tobacco use, presenting hazards to health Z87.891     Healthcare maintenance Z00.00     Cocaine abuse (H) F14.10     Acute on chronic systolic heart failure (H) I50.23     Other ill-defined heart diseases I51.89     Essential hypertension I10     Mixed type age-related cataract, both eyes H25.813     Leg swelling M79.89     Atrial fibrillation with rapid ventricular response (H) I48.91     Hypervolemia due to congestive heart failure (H) E87.70, I50.9     Acute on chronic HFrEF (heart failure with reduced ejection fraction) (H) I50.23     Shortness of breath R06.02     Chronic atrial fibrillation (H) I48.20     Elevated  troponin R77.8     Acute HFrEF (heart failure with reduced ejection fraction) (H) I50.21     Acute kidney failure, unspecified (H) N17.9         Safety: Pt is alert and oriented, calling for needs. Pt is joni but needs assistance with exiting and entering bed. Pt is also calling for help with repositioning in bed.     Pain: Pt reported back and B/L LE pain and received prn with relief.    Medications, Skin, Tubes/Lines: Pt is taking medications whole with water, wounds to R LE foot SAMEER. No tubes/lines in place.     Swallowing/Nutrition: Orders Placed This Encounter      Combination Diet 2 gm K Diet; Moderate Consistent Carb (60 g CHO per Meal) Diet; 2 gm NA Diet        Bowel/Bladder: Pt is continent of bowel and bladder. LBM 2/23/23. Pt is not complaining of constipation or abdominal discomfort, but may need further intervention to increase bowel motility.     Psychosocial: Pt lives at home alone in an apartment. Pt states she has 14hrs/day of PCA services provided by her caregiver, Hira Mujica. 4 stairs to enter home. Railings safe and in good condition, railings on both sides of stairs. Tub shower with bench. Needs assistance with: Bathing, Dressing, Grooming, Ambulation-walker, Ambulation-cane, Cooking, Laundry, Shopping, Meal Preparation, Transportation, Cleaning. Chronic pain PTA. [facility] ([phone]). Denied mental health concerns. Tobacco use reported. Denied alcohol abuse. No financial concerns.     ADLs:    Mobility: MOD I    Grooming: IND seated w/ set up.    Dressing:  UB: set UP. LB- with extra time while sitting on commode over toilet with use of reacher able to get both legs into shorts and up legs max a to adjust over hips.standing at fww.     Feet- socks pt able to don after set up of sock aide no instruction needed    Bathing: pt able towash 8/10 areas assist with feet and buttocks. dep liko to rollling shower chair  Toileting: Mod I clothing management/tongs  Vision/Cognition:  Anxious. assess higher cog     Pt made MOD I in room and transfers per PT yesterday. Pt feeling better today. Anxious about equipment for home.  Pending approval w/ commode and tub bench this afternoon w/ MA coverage. Pt declined to attempt shower this AM but reported she would do a sponge bath before leaving home tomorrow. Pt will go to sister's home w/o stairs and should resume PCA services/assist w/ IADLs.       Mobility:   Bed Mobility: Mod I with rails  Transfer: Mod I with FWW  Gait:  Mod I with FWW in room. Up to ~25 ft with WC follow  Stairs: 4 inch step up in bars - CGA   Balance: Requires at least single UE support in standing  Pt has met all goals for safe discharge to sister's home without any stairs to enter or within. Plan to trial 4WW later this morning as this is what she has available for home; otherwise, will need to order bariatric FWW for home.    Cognition/Language: intact     Community Re-Entry: Plan for manual WC-based with family/PCA assist for community mobility.    Transportation: Sister to provide. Car transfer not an anticipated barrier, but will cover later this morning.    Decision maker: self    Plan of Care and goals reviewed and updated.    Discharge Plan/Recommendations    Fall Precautions: continue    Patient/Family input to goals: yes     Estimated length of stay: 16 days     Overall plan for the patient: reach a level of mod I       Utilization Review and Continued Stay Justification    Medical Necessity Criteria:    For any criteria that is not met, please document reason and plan for discharge, transfer, or modification of plan of care to address.    Requires intensive rehabilitation program to treat functional deficits?: Yes    Requires 3x per week or greater involvement of rehabilitation physician to oversee rehabilitation program?: Yes    Requires rehabilitation nursing interventions?: Yes    Patient is making functional progress?: Yes    There is a potential for additional  functional progress? Yes    Patient is participating in therapy 3 hours per day a minimum of 5 days per week or 15 hours per week in 7 day period?:Yes    Has discharge needs that require coordinated discharge planning approach?:Yes      Barriers/Concerns related to meeting medical necessity criteria:  None     Team Plan to Address Concern:  As needed       Final Physician Sign off    Statement of Approval:  Vladislav Vazquez, DO      Patient Goals    Social Work Goals: Home tomorrow, Wed 03/01/23, see Sari lockhart for more information. No immediate needs.        Therapy Frequency (OT): Daily  OT: Hygiene/Grooming: modified independent  OT: Upper Body Dressing: Modified independent  OT: Lower Body Dressing: Modified independent  OT: Upper Body Bathing: Supervision/stand-by assist  OT: Lower Body Bathing: Minimal assist  OT: Bed Mobility: Minimal assist (shower)   OT: Toilet Transfer/Toileting: Modified independent  OT: Meal Preparation: Supervision/stand-by assist  OT: Home Management: Supervision/stand-by assist  OT: Cognitive: Patient/caregiver will verbalize understanding of cognitive assessment results/recommendations as needed for safe discharge planning      PT Predicted Duration/Target Date for Goal Attainment: 02/28/23  PT Frequency: Other (see comments) ( minutes daily)  PT: Bed Mobility: Modified independent  PT: Transfers: Modified independent, Assistive device  PT: Gait: Assistive device, 25 feet, Rolling walker (for limited household mobility per PLOF)  PT: Stairs: Minimal assist, 4 stairs, Rail on both sides  PT: Perform aerobic activity with stable cardiovascular response: NuStep, intermittent activity, 15 minutes  PT: Goal 1: car transfer with MOD or < to access personal transportation  PT: Goal 2: pt to state 3 or > fall prevention strategies following education          Nursing Goals  Bowel and Bladder care  Fall prevention   Medication Education  Skin Care protection       Patient Goal:   Pain Management: Pt will have appropriate pain management by advocating for pain medications and using methods combined with ice, heat and repositioning to mange pain and actively participate in therapies                             Goal: Skin Integrity: Pt will maintain adequate skin integrity and avoid skin breakdown by utilizing repositioning, learning appropriate hygiene cares, and engaging with nursing on treatment of existing skin wounds                    Goal: Safety Management: Pt will remain free from halls by utilizing call light use, waiting for assistance for transfers, until Mod I prior to discharge from ARU

## 2023-02-28 NOTE — PLAN OF CARE
"Rehabilitation Medicine Tub bench Face to Face Note     Diagnosis: Heart Failure exacerbation.  Current height:  5'7\"  Current weight:469 lbs 5.8 ounces.  : 1975  Hip width: 52 inches  Leg length in sitting from (back of buttock to behind knee): 26 inches     Current transfer status: Mod IFWW  Current Toileting status: Mod I FWW  Current Mobility Status: Mod I FWW, short distance only.    Patient will need an extended tub bench for showering tasks that will fit patient's body habitus and hold her weight capacity due to limited activity tolerance/strength for prolonged standing with concerns for fatigue and fall risk.   Pt will have a PCA that will provide assistance w/ transfers and showering tasks decreasing caregiver burden and promoting safe mobility.      Arm and leg strength: UE strength and trunk control adequate to sit on a extended tub bench.       **Patient requires an extra wide or heavy duty commode chair due to weight of more than 300lbs or patient's body stature requires extra width.   Length of need: Indefinitely.      Vladislav Vazquez DO  NPI: [id number]    Signature:  Date:                              "

## 2023-02-28 NOTE — PROGRESS NOTES
[facility]  Transitional Care Unit Progress Note  Pricilla Brown  [id number]  February 28, 2023         Assessment & Plan:   Pricilla Brown is a 48 year old female  w/ PMH HFmrEF (EF 40-45%) 2/2 NICM, long-standing persistent AF, HTN, type II DM, morbid obesity, asthma, JYOTI and Graves disease s/p radioiodine ablation c/b hypothyroid  admitted on 1/23/2023 to [facility] service. She is admitted to ARU on 2/14/23.    # Debility and generalized weakness   -PT/OT are working diligently with her and she is prepared to discharge home to [facility].     # Acute on Chronic Systolic Heart Failure (EF 40-45%), resolving  # Atrial Fibrllation, CHADS2 vasc of 3 on anticoagulation  # Hypertension   # Troponemia secondary to heart failure exacerbation, resolved  -Most recent Echo with EF 40-45%. Coronary angiogram 2012 with no significant CAD.   -Current weight: 469lb (Pt weight on admission 1/23 555lbs) with 86lb apparent weight drop.  Daily Weights-fluctuate greatly due to inaccuracy of bed weights and patient unable to fit on standing scale.  Appears to have stabilized over the last couple of days  - Continue Bumex 1 mg twice daily     - Continue Toprol XL to 50mg  - Continue diltiazem 120 BID   - Continue hydralazine 10mg PO TID (hold parameters SBP <110), can give additional prn if sbp >180  - Continue PTA apixaban  - ACEi/ARB/ARNI: contraindicated d/t lisinopril allergy.   - Continue strict I/O, daily weights and close monitoring of electrolytes  - Follow up with [facility] 4/5/23.     # Obstructive sleep apnea, not using Bipap due to mask issues  # Asthma  Patient had not been using BiPAP due to issues with mask, and instead was using oxygen with sleep.  RT continuously meeting with patient to try different masks.  Patient declining mass choices and using nasal cannula with sleep.  Noted to be very lethargic 2/22 and strongly encouraged patient to use BiPAP.  ABGs checked-pH 7.39,  PCO2 arterial 50, PCO2 arterial 65, bicarb 30, base excess arterial 4.0.  Patient has been now using BiPAP at bedtime and with naps.  She is much more alert and oriented today and verbalizes that she has more energy throughout the day.    -Incruse Ellipta (substituted for Spiriva) 1 puff inhalation daily  -Continue to encourage use of BiPAP at bedtime and with naps  -Albuterol inhaler as needed     # Type 2 Diabetes Mellitus, uncontrolled (A1c 13.5%) with insulin resistance  Endocrinology following and per their recommendations:  2/20/23 note:  -Continue Glargine U-300 65U once daily   -Continue sliding scale insulin Aspart TID with meals at 1 U per 5gm CHO ratio  -Continue Victoza 1.2mg subcu once daily         -Outpatient follow-up with [facility] endocrinology, Dr. Ambrocio on 3/17/2023     # Acute kidney injury, resolved  -Cr is currently 1.21 and at her baseline. She reports urinary frequently. She experienced some NIRAJ during her diuresis with Cr peak at 1.5 and potassium at 7.3, which have resolved since. Her Spironolactone was stopped at that time.     - Continue BMP, Mag, Phos on MWF while at ARU  - Spironolactone has been stopped due to intermittent severe hyperkalemia episodes  - Continue strict I/O, dose adjustment of medications accordingly, avoid nephrotoxic agents     # Right toe wound, necrotic toes  Present on admission, right great toe and third toe, dry gangrene, patient on or how long wounds have been present.  Vascular, podiatry, and orthopedics consulted while inpatient.  ABIs with normal waveforms.    -WOCN: Wound cares to right toe daily.  Betadine to be applied to toes and be sure to apply over dry eschar.  Okay to leave open to air.  No soaking feet  -Ortho follow-up to discuss elective amputation on 3/3/23 with Dr. Leahy  -Elevate lower extremities, weight-bear as tolerated     # Graves Disease s/p Radioiodine Ablation  # Hypothyroidism 2/2 Radioiodine Ablation  TSH 20.4 and free T4 0.82  on admission. Endocrine followed during inpatient admission and updated levothyroxine dosing.   - Continue levothyroxine 200 mcg M-Sat- 400 mcg on Sunday.    - Recheck thyroid function tests in a month (March 2023)    # Hyperlipidemia  -Continue home regimen of  Lipitor 40 mg daily    # Chronic upper and lower extremity joint pain  -Continue with as needed lidocaine patches, encourage increased mobility          Interval History:   Ms. Brown was sitting up in bed in good spirits asking when she can leave the hospital. She denies any chest pain, shortness of breath, fevers, chills or additional new concerns. She feels good about the amount of fluid that was removed from her body from diuresis            Physical Exam:   Vitals were reviewed  Blood pressure 113/77, pulse 97, temperature 97.5  F (36.4  C), temperature source Oral, resp. rate 16, weight (!) 212.9 kg (469 lb 5.8 oz), SpO2 96 %, not currently breastfeeding.  General: 48 year old obese female sitting up in bed, no acute distress, denies pain, very pleasant in conversation  Cardiovascular: regular rate and rhythm, no appreciable murmurs, rubs or gallops  Lungs: breathing comfortably on room air, trace crackles to bilateral bases, no wheezing or rhonchi auscultated bilaterally  Abdomen: obese abdomen, tinkering bowel sounds present, soft, non-tender to palpation throughout  Vascular: 1+ pretibial pitting edema bilaterally  Skin: no jaundice, rashes, or lesions      Mari Perez PA-C, MPH  Internal Medicine [facility] Hospitalist  [phone]

## 2023-02-28 NOTE — PLAN OF CARE
Goal Outcome Evaluation:      Plan of Care Reviewed With: patient    Outcome Evaluation: Pt's vitals stable. BG still elevated. Followed by Endo. Correction and carb count novolog given with meals. prn oxycodne x 1 this morning for right posterior thigh pain. Pt Mod I in room. She reported she had a bm yesterday. Continent of bladder and managed toileting with tongs. Cares done/mepilex applied to right foot toes. Skin cares done and Lymphwraps applied to gasper LEs. Pt is motivated to discharge home tomorrow at 11:00am.

## 2023-03-01 NOTE — DISCHARGE SUMMARY
[facility]   Acute Rehabilitation Unit  Discharge summary     Date of Admission: 2/14/2023  Date of Discharge: 3/1/2023  Disposition: home with home care nursing/therapies  Primary Care Physician: Mario Lockhart  Attending physician: Vladislav Vazquez DO      DISCHARGE DIAGNOSIS      Acute on chronic HFrEF    Persistent atrial fibrillation     Hypertension    CKD stage 3 with recent NIRAJ     Hypothyroidism    Type 2 diabetes mellitus    Hx asthma    Tobacco use disorder     JYOTI    Hyperlipidemia    Muscle spasms, R posterior thigh    Acute on chronic pain    Suspected left ulnar neuropathy    Adjustment disorder with depressed mood    Morbid obesity      BRIEF SUMMARY  Pricilla Brown is a 48 year old female with a past medical history of chronic HFrEF 2/2 NICM, persistent atrial fibrillation (on Eliquis), asthma, diabetes mellitus type II, hypertension, obesity, hypothyroidism 2/2 radioablation for Graves disease, JYOTI, CKD, and recent admission for HF exacerbation (12/25/22-1/1/23) who was admitted on 1/23/23 with heart failure exacerbation with hospital course complicated by encephalopathy, hypercarbia, NIRAJ, hyperkalemia, labile blood glucose, UTI, dry gangrene of toes on right foot, and pain.  She is now admitted to ARU on 2/14/23 for multidisciplinary rehabilitation and ongoing medical management.    REHABILITATION COURSE  PT: Discharged to home with home therapy.     Progress towards therapy goal(s). See goals on Care Plan in [facility] electronic health record for goal details.  Goals partially met.  Barriers to achieving goals:   ongoing fatigue and intermittent pain.     Bed Mobility: Mod I with rails  Transfer: Mod I with FWW  Gait:  Mod I with FWW in room. Up to ~25 ft with WC follow  Stairs: 4 inch step up in bars - CGA   Balance: Requires at least single UE support in standing     Therapy recommendation(s):    Continued therapy is recommended.  Rationale/Recommendations:   To address functional strength, endurance, and balance impairments, for improved safety and IND with functional mobility, ADLs, and IADLs and to reduce risk of future falls. Pt has been issued a bariatric FWW.    OT: Discharged to home with home therapy.    Current Status:  ADLs:    Mobility: MOD I    Grooming: IND seated w/ set up.    Dressing:  UB: set UP. LB- with extra time while sitting on commode over toilet with use of reacher able to get both legs into shorts and up legs max a to adjust over hips.standing at fww.     Feet- socks pt able to don after set up of sock aide no instruction needed    Bathing: pt able towash 8/10 areas assist with feet and buttocks. dep liko to rollling shower chair  Toileting: Mod I clothing management/tongs  Vision/Cognition: Anxious. assess higher cog       MEDICAL COURSE    Acute on Chronic Systolic Heart Failure (EF 40-45%)  Long-Standing Persistent Atrial Fibrllation  Hypertension   Troponin Elevation 2/2 CHF  [PTA meds: apixaban 5 mg BID, atorvastatin 40 mg daily, Bumex 5 mg BID, hydralazine 12.5 mg TID, hydrochlorothiazide 12.5 mg BID, isosorbide dinitrate 20 mg TID, Toprol XL 75 mg daily, spironolactone 50 mg daily]  Pt admitted with c/o increasing SOB, MURRAY, chest tightness, weight gain in spite of compliance with PO diuretics. NT BNP 5891 (was 2030 during most recent admission).  Lactic acid 2.2 on admission with improvement to 1.1 with diuresis. Troponin mildly elevated, 67-->75-->60 likely 2/2 CHF exacerbation; no concern for ACS currently. Most recent Echo with EF 40-45%. Coronary angiogram 2012 with no significant CAD. Of note, pt recently admitted 12/25-1/1 HF exacerbation, diuresed to 472lbs. Pt weight on admission 1/23 555lbs and down to 450lbs on discharge to ARU.  Weights variable and likely not very accurate at ARU (bed weights).  Most recent weight of 469 on 2/27.  - Continue Bumex 1 mg BID  - Per cardiology, suspect EDW is around 445-450 lbs.  Monitor daily  weights.  Call [facility] clinic if weight increases 5 lbs in a day two days in a row or 10 lbs in 1 week.  - Continue I/O, low Na diet, 2L fluid restriction   - BB: Continue Toprol XL 50 mg daily  - ACEi/ARB/ARNI: Discontinued Lisinopril d/t allergy, if BP elevated, can trial Losartan    - SGLT2i: Contraindicated with recurrent yeast infections   - Aldosterone Antagonist: Discontinued Aldactone due to NIRAJ, hyperkalemia  - Rate Control: BB as above. Given mildly reduced EF, ok to use Diltiazem 120 mg BID  - Anticoagulation: CHADSVASC 3; Continue PTA apixaban  - Afterload reduction: Continue hydralazine 10 mg TID  - [facility] consulted; follow up after ARU discharge.  Plan to discuss CardioMEMS at this appointment.     Chronic Kidney Disease Stage 3b  NIRAJ, improving  Longstanding history of diabetes and HTN. Baseline Cr ~1.2-1.4. Cr on admission 1.56. During hospitalization, patient diuresed to 427 lbs with NIRAJ, Cr peak 2.1. After backing off on diuretics, Cr improved to 1.4 on 2/14 at admission to ARU and remains stable at 1.2 on discharge.  - Avoid nephrotoxic agents  - Resume diuretics as listed above  - Follow up with PCP, repeat BMP as indicated     Hyperkalemia, resolved  Potassium continually rising since 2/9 with peak 2/11 @ 7.3.  EKG without acute changes, rhythm stable in a fib.  Nephrology consulted.  Potassium shifted with insulin, calcium gluconate for cardiac stability, lokelma 15 g X 1, IV Bumex total of 6 mg on 2/11/23.  Spironolactone discontinued.  - Low K diet  - Continue to hold spironolactone, per nephrology can consider resuming once Cr stable  - Diuresis as above  - Follow up with PCP, repeat BMP as indicated     Hyponatremia, resolved  In setting of aggressive diuresis.  Most recent labs on 2/27 with Na stable/WNL at 138.    - Follow up with PCP, repeat BMP as indicated      Hypothyroidism 2/2 below  Graves Disease s/p Radioiodine Ablation  TSH 20.4 and free T4 0.82 on admission. Endocrine  "consulted.  - Continue levothyroxine 200 mcg M-Sat + 400 mcg on Sunday.    - Recheck TFT in a month     Type 2 Diabetes Mellitus, insulin-dependent, uncontrolled  Hyperglycemia  Diabetic neuropathy  A1c 13.5% on 12/25/2022. Discrepancies in patient's home regimen.  Per endo, PTA on \"Toujeo 225 units daily, novolog 100 units with each meal + CS, and ozempic 1 mg weekly. She could not tolerate empaglifozin due to frequent UTIs and metformin due to GI side effects.\" Endocrinology consulted during admission.  Insulin gtt started 2/1 per endocrinology, transitioned back to subcutaneous lantus and novolog on 2/3/23.  Endocrinology followed at ARU, switched to Tuojeo and made additional adjustments to regimen as below.  - Monitor BG TIC AC + HS + 0200  - Continue Tuojeo 65 units daily  - Resume Ozempic 1 mg once weekly  - Continue Novolog 11 units with a meal, zero units if only having a Premier shake under 10 grams carb, and 4 units for  snacks  - Continue Novolog sliding scale  - OP follow up Thelma Archibald/Dr Silva Damon, scheduled on 3/17     Necrotic wounds of Right Toes 1, 3, and 4, resolved  Present on admission, pt unaware how long wounds have been present.  Vascular consulted; JOSETTE's completed showing patent vessels and normal waveforms. They recommend orthopedic foot and ankle consult.  Ortho consulted, felt consistent with dry gangrene, plan for wound care and follow-up as outpatient.   WOCN following as inpatient and provided wound care recs.  - WOCN consulted at ARU to continue to follow, appreciate assistance.  WOCN assessed toes on 2/15, debrided devitalized skin from tips of digits 1 and 4, intact skin beneath with no open wounds, no evidence of dry gangrene.  Did lose toenails on those digits.  - Wound cares updated per WOC recs: Bilateral toes and feet: Wash daily with warm water, soap and a washcloth, taking particular attention to wash well between and under toes. Dry thoroughly with a clean towel. " Moisturize skin on bilateral feet and legs with Sween 24. Viking missing toenail beds on great and 4th toes with betadine.  Also needs consistent foot cares.  - WBAT  - Podiatry consulted 2/26 per nursing's concerns for toenails (onychomycosis) and skin issues but unable to be seen before discharge  - Follow up with podiatry as outpatient (rescheduled from 2/17 to 3/3 with Dr. Tosin haro still anticipate at ARU).     Urinary Retention, resolved  Phan catheter placed on admission for strict I/O while aggressively diuresing. Removed 2/11, pt with urinary retention and straight cath x 3, Phan replaced. Urology curbside, continue phan for now, can re-attempt voiding trial as OP.  Per patient request, with improving transfers to facilitate toileting needs, phan removed on 2/17 for trial of void.  Seems to be voiding well.     E coli UTI, treated  UA on 2/19 with large leuk, pyuria, bacteriuria.  Started on empiric ciprofloxacin. UC with >100k colonies E coli, susceptible to cipro.  Has now completed 5-day course of cipro 500 mg BID (2/19-2/26)     Asthma  - Continue PTA inhalers (levalbuterol and spiriva)     Tobacco use disorder  - Continue nicotine patch 14 mcg, started during recent (December) admission  - PRN nicorette gum added 2/20 for breakthrough cravings     JYOTI  Not consistently using BiPAP while admitted due to concerns with poor fit of our masks despite multiple interventions with RT.  - Resume home BiPAP at night    Hyperlipidemia   - Continue PTA Lipitor 40 mg daily     Acute low back pain with bilateral radiculopathy, improving  Chronic Pain  [PTA meds: Tylenol PRN, diclofenac QID PRN, gabapentin 600 mg TID, oxycodone 5 mg 5x/day]  Of note, per PDMP #140 tabs of oxycodone 5 mg were filled on 2/16 while patient hospitalized - no additional tabs sent at discharge.  Patient reporting diffuse joint pain while inpatient. Using narcotics along with gabapentin, topical nsaids, and muscle relaxants. Aggressive  diuresis vs diabetic neuropathy.  Pain management curbside consulted 1/31 while inpatient; decrease gabapentin to BID, stopped flexeril and added robaxin.  Pain improved initially at ARU admission but significantly increased pain in bilateral low back radiating to bilateral buttocks and posterior legs to feet.  Decreased with ambulation, but increased with any movement in bed.  Lumbar/thoracic xrays with grossly maintained vertebral height, no evidence of fracture.  Right thigh and hip xray with no evidence of fracture, mild degenerative changes.  US RLE 2/19 negative for DVT. US of soft tissue showed no fluid collection identified to suggest abscess; mild subcutaneous edema. As of 2/22 localized posterior right thigh pain seems more MSK related and secondary to severe cramps/trigger point. No underlying medical / electrolyte abnormality that is contributing at this point. Deconditioning and disuse atrophy are likely contributing.    - Continue PRN Tylenol, voltaren (feet - not using), capsaicin (feet - not using), robaxin, and hydroxyzine. Continue stretching and modalities.  - Robaxin not on formulary and patient not using much so not ordered at discharge.  - Resumed PTA gabapentin 600 mg TID on 2/21  - Continue warm pack, Bengay QID, TENS  - Topamax 25 mg daily discontinued on 2/24 due to possible side effects (hand tremors)  - Continue oxycodone 5 mg q6h PRN  - Psychology also met with patient and provided non-pharm strategies for coping with chronic pain.     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 Symptoms were worse that day as she tried to catch herself in the bathroom the day before. Exam suggestive of ulnar neuropathy at the elbow; talked to PT and nursing about elbow support when sitting in chair.   - L elbow sleeve with padding to be used during the day.   - Consider NCS/EMG as  "outpatient for more eval if ongoing     Adjustment disorder with depressed mood  Clinical psychology evaluation on February 22.  Appreciate assistance; see consult note for more details.     Morbid obesity  Estimated body mass index is 73.51 kg/m  as calculated from the following:    Height as of 1/23/23: 1.702 m (5' 7\").    Weight as of this encounter: 212.9 kg (469 lb 5.8 oz).  - Complicates rehab/cares     Possible COVID-19 exposure  Per ID, patient was previously on unit with high prevalence of COVID-19.  Tested negative on 2/15.  Infection prevention recommending repeat testing to confirm negative at 5-7 days.  Ordered on 2/16 by ID provider; patient declining despite education.  Afebrile, no signs/symptoms of COVID infection.  COVID PCR negative on 2/20.      DISCHARGE MEDICATIONS  Current Discharge Medication List      START taking these medications    Details   docusate sodium (COLACE) 100 MG capsule Take 1 capsule (100 mg) by mouth daily as needed for constipation  Qty: 30 capsule, Refills: 0    Associated Diagnoses: Healthcare maintenance      insulin glargine U-300 (TOUJEO) 300 UNIT/ML (1 units dial) pen Inject 65 Units Subcutaneous every morning for 30 days  Qty: 6.5 mL, Refills: 0    Associated Diagnoses: Diabetes mellitus without complication (H)      menthol, Topical Analgesic, 2.5% (BENGAY VANISHING SCENT) 2.5 % GEL topical gel Apply topically every 6 hours as needed for moderate pain (4-6)  Qty: 57 g, Refills: 0    Associated Diagnoses: Neuropathic pain of lower extremity, unspecified laterality      miconazole (MICATIN) 2 % external powder Apply topically as needed for itching or other  Qty: 71 g, Refills: 0    Associated Diagnoses: Healthcare maintenance      nicotine (NICORETTE) 2 MG gum Place 1 each (2 mg) inside cheek every hour as needed for smoking cessation  Qty: 28 each, Refills: 0    Associated Diagnoses: Personal history of tobacco use, presenting hazards to health      oxyCODONE " (ROXICODONE) 5 MG tablet Take 1 tablet (5 mg) by mouth every 6 hours as needed for severe pain (7-10) or breakthrough pain  Qty: 30 tablet, Refills: 0    Associated Diagnoses: Neuropathic pain of lower extremity, unspecified laterality         CONTINUE these medications which have CHANGED    Details   apixaban ANTICOAGULANT (ELIQUIS) 5 MG tablet Take 1 tablet (5 mg) by mouth 2 times daily for 30 days  Qty: 180 tablet, Refills: 0    Associated Diagnoses: Dilated cardiomyopathy (H); Acute on chronic heart failure with preserved ejection fraction (H)      atorvastatin (LIPITOR) 40 MG tablet Take 1 tablet (40 mg) by mouth daily for 30 days  Qty: 30 tablet, Refills: 0    Associated Diagnoses: Dyslipidemia      bumetanide (BUMEX) 1 MG tablet Take 1 tablet (1 mg) by mouth 2 times daily for 30 days  Qty: 60 tablet, Refills: 0    Associated Diagnoses: Acute HFrEF (heart failure with reduced ejection fraction) (H)      clotrimazole (LOTRIMIN) 1 % external cream Apply topically 2 times daily as needed (skin irritation)  Qty: 30 g, Refills: 0    Associated Diagnoses: Furuncle of abdominal wall      diclofenac (VOLTAREN) 1 % topical gel Apply 2 g topically 4 times daily as needed for moderate pain (4-6)  Qty: 50 g, Refills: 0    Associated Diagnoses: Neuropathic pain of lower extremity, unspecified laterality      diltiazem (CARDIZEM SR) 120 MG CP12 12 hr SR capsule Take 1 capsule (120 mg) by mouth 2 times daily  Qty: 60 capsule, Refills: 0    Associated Diagnoses: Acute HFrEF (heart failure with reduced ejection fraction) (H)      gabapentin (NEURONTIN) 300 MG capsule Take 2 capsules (600 mg) by mouth 3 times daily for 30 days  Qty: 180 capsule, Refills: 0    Associated Diagnoses: Neuropathic pain of lower extremity, unspecified laterality      hydrALAZINE (APRESOLINE) 10 MG tablet Take 1 tablet (10 mg) by mouth 3 times daily for 30 days  Qty: 90 tablet, Refills: 0    Associated Diagnoses: Acute HFrEF (heart failure with  reduced ejection fraction) (H)      !! insulin aspart (NOVOLOG PEN) 100 UNIT/ML pen Inject 1-14 Units Subcutaneous 3 times daily (before meals) Novolog 11 units with a meal, zero units if only having a Premier shake under 10 grams carb, and 4 units for  snacks  Qty: 15 mL, Refills: 0    Associated Diagnoses: Diabetes mellitus without complication (H)      !! insulin aspart (NOVOLOG PEN) 100 UNIT/ML pen Inject 1-11 Units Subcutaneous At Bedtime  Qty: 15 mL, Refills: 0    Associated Diagnoses: Diabetes mellitus without complication (H)      !! insulin aspart (NOVOLOG PEN) 100 UNIT/ML pen Inject 4 Units Subcutaneous Take with snacks or supplements for high blood sugar  Qty: 15 mL, Refills: 0    Comments: Novolog 11 units with a meal, zero units if only having a Premier shake under 10 grams carb, and 4 units for  snacks  Associated Diagnoses: Diabetes mellitus without complication (H)      !! levothyroxine (SYNTHROID/LEVOTHROID) 200 MCG tablet Take 2 tablets (400 mcg) by mouth once a week for 10 doses  Qty: 20 tablet, Refills: 0    Associated Diagnoses: Hypothyroidism following radioiodine therapy      !! levothyroxine (SYNTHROID/LEVOTHROID) 200 MCG tablet Take 1 tablet (200 mcg) by mouth six times a week for 24 doses  Qty: 24 tablet, Refills: 0    Associated Diagnoses: Hypothyroidism following radioiodine therapy      metoprolol succinate ER (TOPROL XL) 50 MG 24 hr tablet Take 1 tablet (50 mg) by mouth daily for 30 days  Qty: 30 tablet, Refills: 0    Associated Diagnoses: Acute HFrEF (heart failure with reduced ejection fraction) (H)      Semaglutide, 1 MG/DOSE, (OZEMPIC) 4 MG/3ML pen Inject 1 mg Subcutaneous once a week for 4 doses  Qty: 3 mL, Refills: 0    Associated Diagnoses: BMI 60.0-69.9, adult (H); Type 2 diabetes mellitus with hyperglycemia, with long-term current use of insulin (H)       !! - Potential duplicate medications found. Please discuss with provider.      CONTINUE these medications which have NOT  CHANGED    Details   !! ACCU-CHEK RON PLUS test strip USE TO TEST BLOOD SUGAR FOUR TIMES A DAY  OR AS DIRECTED.  Qty: 360 strip, Refills: 2    Associated Diagnoses: Type 2 diabetes mellitus (H)      acetaminophen (TYLENOL) 325 MG tablet Take 2 tablets (650 mg) by mouth 3 times daily as needed for mild pain or fever (total acetaminophen dose should not exceed 3000 mg per day)  Qty: 180 tablet, Refills: 0    Comments: Call clinic to schedule follow up appointment.[phone]  Associated Diagnoses: Neuropathic pain of lower extremity, unspecified laterality      albuterol (PROVENTIL) (2.5 MG/3ML) 0.083% neb solution Take 1 vial (2.5 mg) by nebulization every 6 hours as needed for shortness of breath / dyspnea or wheezing  Qty: 90 mL, Refills: 1    Associated Diagnoses: Shortness of breath      albuterol (VENTOLIN HFA) 108 (90 Base) MCG/ACT inhaler Inhale 2 puffs into the lungs every 6 hours as needed for shortness of breath / dyspnea  Qty: 18 g, Refills: 1    Comments: Pharmacy may dispense brand covered by insurance (Proair, or proventil or ventolin or generic albuterol inhaler)  Associated Diagnoses: Mild intermittent asthma without complication      blood glucose (NO BRAND SPECIFIED) lancets standard USE TO CHECK  blood sugar fasting each am  prelunch and predinner daily OR AS DIRECTED Call clinic to schedule MD APPT.  Qty: 400 each, Refills: 3    Associated Diagnoses: Diabetes mellitus, type 2 (H)      !! blood glucose (NO BRAND SPECIFIED) test strip Use to test blood sugar 4 times daily or as directed. Accu check Ron plus  Qty: 400 strip, Refills: 1    Associated Diagnoses: Type 2 diabetes mellitus (H)      !! blood glucose (NO BRAND SPECIFIED) test strip Use to test blood sugar 4 times daily or as directed.  Qty: 400 strip, Refills: 3    Comments: For accu chek ron  Associated Diagnoses: Type 2 diabetes mellitus with hyperglycemia, with long-term current use of insulin (H)      !! blood glucose (NO BRAND  SPECIFIED) test strip Use to test blood sugars 3 times daily or as directed  Qty: 400 strip, Refills: 3    Associated Diagnoses: Diabetes mellitus without complication (H)      blood glucose monitoring (ACCU-CHEK FASTCLIX) lancets Use to test blood sugar 4 times daily or as directed.or lancets that go with meter being used  Qty: 360 each, Refills: 3    Associated Diagnoses: Diabetes mellitus without complication (H)      blood glucose monitoring (IBG STAR) meter device kit -PLEASE GIVE PATIENT A DEVICE HER INSURANCE WILL COVER-  Qty: 1 kit, Refills: 0    Associated Diagnoses: Type 2 diabetes mellitus with hyperglycemia, with long-term current use of insulin (H)      !! blood glucose monitoring (NO BRAND SPECIFIED) meter device kit Use to test blood sugar 4  times daily or as directed.  Qty: 1 kit, Refills: 0    Associated Diagnoses: Type 2 diabetes mellitus (H)      !! blood glucose monitoring (NO BRAND SPECIFIED) meter device kit Use to test blood sugar 3 times daily or as directed.  Qty: 1 kit, Refills: 1    Associated Diagnoses: Diabetes mellitus, type 2 (H)      Blood Pressure KIT Use to check blood pressure as directed, once daily and as needed. Record results.  Qty: 1 kit, Refills: 1    Associated Diagnoses: Diabetes mellitus without complication (H); SOB (shortness of breath); Morbid obesity (H); Hypothyroidism following radioiodine therapy      !! Continuous Blood Gluc Sensor (FREESTYLE BELLO 14 DAY SENSOR) MISC Change every 14 days.  Qty: 2 each, Refills: 11    Associated Diagnoses: Diabetes mellitus without complication (H)      !! Continuous Blood Gluc Sensor (FREESTYLE BELLO 14 DAY SENSOR) MISC 1 each every 14 days  Qty: 2 each, Refills: 3    Associated Diagnoses: Type 2 diabetes mellitus with hyperglycemia, with long-term current use of insulin (H)      !! Continuous Blood Gluc Sensor (FREESTYLE BELLO 2 SENSOR) MISC 1 Units every 14 days Check BG 4 times daily before meals and at bedtime.  Qty: 6 each,  Refills: 3    Associated Diagnoses: Type 2 diabetes mellitus with hyperglycemia, with long-term current use of insulin (H)      Efinaconazole 10 % SOLN Externally apply topically daily To toenails.  Qty: 8 mL, Refills: 11    Comments: Advise patient due for follow up with provider  Associated Diagnoses: Dermatophytosis of nail      insulin pen needle (BD RIVER U/F) 32G X 4 MM miscellaneous Use 6 daily or as directed  Qty: 600 each, Refills: 3    Associated Diagnoses: Diabetes mellitus, type 2 (H)      !! order for DME Left foot  Qty: 1 Units, Refills: 0    Associated Diagnoses: Left foot pain; Diabetic neuropathy with neurologic complication (H)      !! order for DME Equipment being ordered: [id number] $92   Plantar, fasciitis, LG, night splint  Qty: 1 each, Refills: 0    Associated Diagnoses: Dermatophytosis of nail; Plantar fasciitis of right foot; Diabetic neuropathy with neurologic complication (H); Peroneal tendinitis, right      !! order for DME Equipment being ordered: Challenger Wide walker if available - patient needs seat, basket and brakes.  Qty: 1 each, Refills: 0    Associated Diagnoses: Dilated cardiomyopathy (H); Chronic systolic heart failure (H)      !! ORDER FOR DME Use your CPAP device as directed by your provider. Pressure change to min 13 max 18cwp  Qty: 1 each, Refills: 99      Respiratory Therapy Supplies (NEBULIZER COMPRESSOR) KIT 1 Device 4 times daily as needed.  Qty: 1 kit, Refills: 3    Associated Diagnoses: COPD (chronic obstructive pulmonary disease) (H)      tiotropium (SPIRIVA HANDIHALER) 18 MCG inhalation capsule Inhale contents of one capsule daily.  Qty: 30 capsule, Refills: 1    Associated Diagnoses: COPD (chronic obstructive pulmonary disease) (H)       !! - Potential duplicate medications found. Please discuss with provider.      STOP taking these medications       capsaicin (ZOSTRIX) 0.025 % external cream Comments:   Reason for Stopping:         ciclopirox (LOPROX) 0.77  % cream Comments:   Reason for Stopping:         diclofenac (VOLTAREN) 1 % topical gel Comments:   Reason for Stopping:         docusate sodium (COLACE) 100 MG tablet Comments:   Reason for Stopping:         glucagon 1 MG SOLR injection Comments:   Reason for Stopping:         insulin glargine (LANTUS PEN) 100 UNIT/ML pen Comments:   Reason for Stopping:         nicotine (NICODERM CQ) 14 MG/24HR 24 hr patch Comments:   Reason for Stopping:         nicotine (NICOTROL) 10 MG/ML SOLN inhalation solution Comments:   Reason for Stopping:         nystatin (MYCOSTATIN) [id number] UNIT/GM external cream Comments:   Reason for Stopping:         polyethylene glycol (MIRALAX/GLYCOLAX) powder Comments:   Reason for Stopping:         prednisoLONE acetate (PRED FORTE) 1 % ophthalmic suspension Comments:   Reason for Stopping:                 DISCHARGE INSTRUCTIONS AND FOLLOW UP  Discharge Procedure Orders   Home Care Referral   Referral Priority: Routine: Next available opening Referral Type: Home Health Therapies & Aides   Number of Visits Requested: 1     Reason for your hospital stay   Order Comments: Cardiac debility     Activity   Order Comments: Your activity upon discharge: activity as tolerated and no driving     Order Specific Question Answer Comments   Is discharge order? Yes      Adult [facility] Follow-up and recommended labs and tests   Order Comments: Follow up with primary care provider, Mario Lockhart, within 7 days for hospital follow- up.      Appointments on [place] and/or [facility] (with [facility] or [facility] provider or service). Call [phone] if you haven't heard regarding these appointments within 7 days of discharge.     Diet   Order Comments: Follow this diet upon discharge: Orders Placed This Encounter      Fluid restriction 2000 ML FLUID      Room Service      Snacks/Supplements Adult: Other; Food Snacks BID: String cheese, crackers, fruit cup (peaches/pears or grapes) and Ensure Max chocolate @ 2 pm, and  turkey sandwich on wheat bread with lettuce, tomato and quinn & a fruit cup (peaches/pears/grapes) a...      Snacks/Supplements Adult: Glucerna; With Meals      Combination Diet 2 g     Order Specific Question Answer Comments   Is discharge order? Yes           PHYSICAL EXAMINATION    Most recent Vital Signs:   Vitals:    02/28/23 1323 02/28/23 1500 02/28/23 1736 02/28/23 2212   BP: 116/75 110/71 104/63 106/60   BP Location: Right arm Right arm Right arm Right arm   Patient Position:  Semi-Sheffield's Semi-Sheffield's Semi-Sheffield's   Cuff Size:  Adult Large Adult Large Adult Large   Pulse: 93 96 94 99   Resp:  16 16    Temp:  97  F (36.1  C) 97.8  F (36.6  C)    TempSrc:  Oral Oral    SpO2:  96% 94%    Weight:           Gen: NAD, pleasant, seated upright in chair  HEENT: NC/AT, MMM  Cardio: irregularly irregular, no murmurs appreciated  Pulm: non-labored on room air, lungs CTA bilaterally  Abd: soft, non-tender, obese, bowel sounds present  Extr: 1+ edema in bilateral lower extremities  Neuro/MSK: AAOx3, PERRL, EOMI, face symmetric, speech clear/fluent, moving all 4 extremities in chair    45 minutes spent in discharge, including >50% in counseling and coordination of care, medication review and plan of care recommended on follow up.     Discharge summary was forwarded to Mario Lockhart (PCP) at the time of discharge, so as to bridge from hospital to outpatient care.     It was our pleasure to care for Pricilla Brown during this hospitalization. Please do not hesitate to contact me should there be questions regarding the hospital course or discharge plan.          Lulu Guerrero PA-C  Physical Medicine and Rehabilitation

## 2023-03-01 NOTE — PROGRESS NOTES
[facility]   Acute Rehabilitation Unit  Daily progress note    INTERVAL HISTORY  Pricilla Brown was seen and examined at bedside this morning. No acute events overnight.  Denies chest pain, shortness of breath, no fever or chills.  Team conference today and patient making great goals.  Plan is for discharge home tomorrow pending equipment.    Functional  PT:     Bed Mobility: Mod I with rails  Transfer: Mod I with FWW  Gait:  Mod I with FWW in room. Up to ~25 ft with WC follow  Stairs: 4 inch step up in bars - CGA   Balance: Requires at least single UE support in standing      ROS: 10 point ROS neg other than the symptoms noted above in the HPI.        MEDICATIONS    apixaban ANTICOAGULANT  5 mg Oral BID     atorvastatin  40 mg Oral Daily     bumetanide  1 mg Oral BID     diltiazem ER  120 mg Oral BID     docusate sodium  50 mg Oral BID     gabapentin  600 mg Oral TID     hydrALAZINE  10 mg Oral TID     insulin aspart   Subcutaneous TID w/meals     insulin aspart  1-14 Units Subcutaneous TID AC     insulin aspart  1-11 Units Subcutaneous At Bedtime     insulin glargine U-300  65 Units Subcutaneous QAM     levothyroxine  200 mcg Oral Once per day on Mon Tue Wed Thu Fri Sat     levothyroxine  400 mcg Oral Weekly     liraglutide  1.2 mg Subcutaneous Daily     menthol (Topical Analgesic) 2.5%   Topical 4x Daily     metoprolol succinate ER  50 mg Oral Daily     miconazole   Topical BID     nicotine  1 patch Transdermal Daily    And     nicotine   Transdermal Q8H OPHELIA     sennosides  8.6 mg Oral BID     umeclidinium  1 puff Inhalation Daily        acetaminophen, albuterol, albuterol, sore throat, capsaicin, clotrimazole, glucose **OR** dextrose **OR** glucagon, diclofenac, docusate sodium, enema compound (docusate/mineral oil/NaPhos) NO MAG CITRATE MIXTURE, hydrALAZINE, hydrOXYzine **OR** hydrOXYzine, insulin aspart, methocarbamol, mineral oil-hydrophilic petrolatum, mineral  oil-hydrophilic petrolatum, naloxone **OR** naloxone **OR** naloxone **OR** naloxone, nicotine, oxyCODONE, - MEDICATION INSTRUCTIONS -, simethicone     PHYSICAL EXAM  /63 (BP Location: Right arm, Patient Position: Semi-Sheffield's, Cuff Size: Adult Large)   Pulse 94   Temp 97.8  F (36.6  C) (Oral)   Resp 16   Wt (!) 212.9 kg (469 lb 5.8 oz)   SpO2 94%   BMI 73.51 kg/m     Gen: NAD, up in chair   Cardio: appears well-perfused  Pulm: non-labored on room air  Abd: obese, soft, non-tender  Ext: lymphedema wraps in place - severe bilateral onychomycosis  Neuro/MSK: awake, alert, moving all extremities in bed, tenderness to palpation at L medial elbow and some weakness at flexor digitorum superficialis and flexor digitorum profundus on the left side for 4th and 5th digits. Also paresthesia at 3-5 digits        LABS  Last Basic Metabolic Panel:  Recent Labs   Lab Test 02/28/23  1723 02/28/23  1221 02/28/23  0820 02/27/23  1634 02/27/23  1259 02/27/23  1147 02/27/23  1024 02/23/23  1557 02/23/23  1155   NA  --   --   --   --  138  --  137  --  137   POTASSIUM  --   --   --   --  4.7  --  5.7*  --  4.3   CHLORIDE  --   --   --   --  101  --  101  --  100   CO2  --   --   --   --  29  --  23  --  29   ANIONGAP  --   --   --   --  8  --  13  --  8   * 174* 273*   < > 125*   < > 156*   < > 259*   BUN  --   --   --   --  48.4*  --  48.4*  --  47.5*   CR  --   --   --   --  1.21*  --  1.15*  --  1.16*   GFRESTIMATED  --   --   --   --  55*  --  58*  --  58*   JUSTIN  --   --   --   --  9.0  --  8.9  --  9.2    < > = values in this interval not displayed.     CBC RESULTS:   Recent Labs   Lab Test 02/27/23  1259 02/27/23  1024 02/23/23  1155   WBC 8.0 8.4 10.4   RBC 4.12 4.47 4.21   HGB 12.0 13.0 12.3   HCT 39.1 41.6 40.1   MCV 95 93 95   MCH 29.1 29.1 29.2   MCHC 30.7* 31.3* 30.7*   RDW 16.0* 16.0* 16.3*    241 259       ASSESSMENT AND PLAN  Pricilla Brown is a 48 year old female with a past medical history of  chronic HFrEF 2/2 NICM, persistent atrial fibrillation (on Eliquis), asthma, diabetes mellitus type II, hypertension, obesity, hypothyroidism 2/2 radioablation for Graves disease, JYOTI, CKD, and recent admission for HF exacerbation (12/25/22-1/1/23) who was admitted on 1/23/23 with heart failure exacerbation with hospital course complicated by encephalopathy, hypercarbia, NIRAJ, hyperkalemia, labile blood glucose, UTI, dry gangrene of toes on right foot, and pain.  She is now admitted to ARU on 2/14/23 for multidisciplinary rehabilitation and ongoing medical management.        Admission to acute inpatient rehab 02/14/23.    Impairment group code: 09 Cardiac -  heart failure exacerbation       Rehabilitation - continue comprehensive acute inpatient rehabilitation program with multidisciplinary approach including therapies, rehab nursing, and physiatry following. See interval history for updates.        Medical Conditions  New actions/orders/updates for today are in blue.     Acute on Chronic Systolic Heart Failure (EF 40-45%)  Long-Standing Persistent Atrial Fibrllation  Hypertension   Troponin Elevation 2/2 CHF  [PTA meds: apixaban 5 mg BID, atorvastatin 40 mg daily, Bumex 5 mg BID, hydralazine 12.5 mg TID, hydrochlorothiazide 12.5 mg BID, isosorbide dinitrate 20 mg TID, Toprol XL 75 mg daily, spironolactone 50 mg daily]  Pt admitted with c/o increasing SOB, MURRAY, chest tightness, weight gain in spite of compliance with PO diuretics. NT BNP 5891 (was 2030 during most recent admission).  Lactic acid 2.2 on admission with improvement to 1.1 with diuresis. Troponin mildly elevated, 67-->75-->60 likely 2/2 CHF exacerbation; no concern for ACS currently. Most recent Echo with EF 40-45%. Coronary angiogram 2012 with no significant CAD. Of note, pt recently admitted 12/25-1/1 HF exacerbation, diuresed to 472lbs. Pt weight on admission 1/23 555lbs and down to 450lbs on discharge to ARU.  Vitals:    02/22/23 0700 02/24/23 0400  02/25/23 0700 02/26/23 0700   Weight: (!) 223.8 kg (493 lb 6.4 oz) (!) 205 kg (452 lb) (!) 219.1 kg (483 lb 1.6 oz) (!) 219.1 kg (483 lb 0.5 oz)    02/27/23 0500   Weight: (!) 212.9 kg (469 lb 5.8 oz)   - Continue Bumex 1 mg BID  - Per cardiology, suspect EDW is around 445-450 lbs.  Monitor daily weights.  Call [facility] clinic if weight increases 5 lbs in a day two days in a row or 10 lbs in 1 week.  Weight remains variable and not very accurate  - Continue I/O, low Na diet, 2L fluid restriction   - BB: Continue Toprol XL 50 mg daily  - ACEi/ARB/ARNI: Discontinued Lisinopril d/t allergy, if BP elevated, can trial Losartan    - SGLT2i: Contraindicated with recurrent yeast infections   - Aldosterone Antagonist: Discontinued Aldactone due to NIRAJ, hyperkalemia  - Rate Control: BB as above. Given mildly reduced EF, ok to use Diltiazem 120 mg BID  - Anticoagulation: CHADSVASC 3; Continue PTA apixaban  - Afterload reduction: Continue hydralazine 10 mg TID (hold if SBP <110), can give additional PRN dose if SBP >180  - [facility] consulted; follow up after ARU discharge.  Plan to discuss CardioMEMS at this appointment.  Will need to be rescheduled from 2/24.     Chronic Kidney Disease Stage 3b  NIRAJ, improving  Longstanding history of diabetes and HTN. Baseline Cr ~1.2-1.4. Cr on admission 1.56. During hospitalization, patient diuresed to 427 lbs with NIRAJ, Cr peak 2.1. After backing off on diuretics, Cr improved to 1.4 on 2/14 at admission to ARU  - Avoid nephrotoxic agents  - Resume diuretics as listed above  - Trend BMP every M/Th.  2/27: Cr stable at 1.21     Hyperkalemia, resolved  Potassium continually rising since 2/9 with peak 2/11 @ 7.3.  EKG without acute changes, rhythm stable in a fib.  Nephrology consulted.  Potassium shifted with insulin, calcium gluconate for cardiac stability, lokelma 15 g X 1, IV Bumex total of 6 mg on 2/11/23.  Spironolactone discontinued.  - Low K diet  - I/O  - Continue to hold spironolactone, per  "nephrology can consider resuming once Cr stable  - Diuresis as above  - Trend BMP every M/Th.  2/27: K stable/WNL at 4.7      Hyponatremia, resolved  In setting of aggressive diuresis.  Now mild, improved to 134 on 2/14.  - Trend BMP every M/Th.  2/27: Na stable/WNL at 138      Hypothyroidism 2/2 below  Graves Disease s/p Radioiodine Ablation  TSH 20.4 and free T4 0.82 on admission. Endocrine consulted.  - Continue levothyroxine 200 mcg M-Sat + 400 mcg on Sunday.    - Recheck TFT in a month     Type 2 Diabetes Mellitus, insulin-dependent, uncontrolled  Hyperglycemia  Diabetic neuropathy  A1c 13.5% on 12/25/2022. Discrepancies in patient's home regimen.  Per endo, PTA on \"Toujeo 225 units daily, novolog 100 units with each meal + CS, and ozempic 1 mg weekly. She could not tolerate empaglifozin due to frequent UTIs and metformin due to GI side effects.\" Endocrinology consulted during admission.  Insulin gtt started 2/1 per endocrinology, transitioned back to subcutaneous lantus and novolog on 2/3/23.   Recent Labs   Lab 02/28/23  1723 02/28/23  1221 02/28/23  0820 02/28/23  0049 02/27/23  2119 02/27/23  1634   * 174* 273* 196* 223* 99   - Monitor BG TIC AC + HS + 0200  - Endocrinology continuing to follow, appreciate assistance.  Per their recs on 2/27:      -Diabetes Treatment Plan for Discharge:               (no Jardiance, due to UTI history)              -Tuojeo: 65 units daily AM               -resume Ozempic 1 mg once weekly (substituted Victoza here) Start this on the morning of the first day  home              -Novolog 11 units with a meal, zero units if only having a Premier shake under 10 grams carb, and 4 units for  snacks              -Novolog sliding scale   Pre-meal Novolog scale   to 159 give 1 units.    to 179 give 2 units.    to 199 give 3 units.    to 219 give 4 units.    to 239 give 5 units.    to 259 give 6 units.    to 279 give 7 units.    to " 299 give 8 units.    to 319 give 9 units.    to 339 give 10 units.    to 359 give 11 units.    to 379 give 12 units.   to 399 give 13 units.  BG >/=400 give 14 units.     Bedtime Novolog scale   to 219 give 1 units.    to 239 give 2 units.    to 259 give 3 units.    to 279 give 4 units.    to 299 give 5 units.    to 319 give 6 units.    to 339 give 7 units.    to 359 give 8 units    to 379 give 9 units.   to 399 give 10 units.  BG >/=400 give 11 units.  - OP follow up Thelma Archibald/Dr Sliva Damon, scheduled on 3/17     Necrotic wounds of Right Toes 1, 3, and 4, resolved  Present on admission, pt unaware how long wounds have been present.  Vascular consulted; JOSETTE's completed showing patent vessels and normal waveforms. They recommend orthopedic foot and ankle consult.  Ortho consulted, felt consistent with dry gangrene, plan for wound care and follow-up as outpatient.   WOCN following as inpatient and provided wound care recs.  - WOCN consulted at ARU to continue to follow, appreciate assistance.  WOCN assessed toes on 2/15, debrided devitalized skin from tips of digits 1 and 4, intact skin beneath with no open wounds, no evidence of dry gangrene.  Did lose toenails on those digits.  - Wound cares updated per WO recs: Bilateral toes and feet: Wash daily with warm water, soap and a washcloth, taking particular attention to wash well between and under toes. Dry thoroughly with a clean towel. Moisturize skin on bilateral feet and legs with Sween 24. [place] missing toenail beds on great and 4th toes with betadine.  Also needs consistent foot cares.  - WBAT  - Podiatry consulted 2/26 per nursing's concerns for toenails (onychomycosis) and skin issues.  - Follow up with podiatry as outpatient (rescheduled from 2/17 to 3/3 with Dr. Leahy given still anticipate at ARU).     Urinary Retention  Keenan catheter placed on admission for  "strict I/O while aggressively diuresing. Removed 2/11, pt with urinary retention and straight cath x 3, Phan replaced. Urology curbside, continue phan for now, can re-attempt voiding trial as OP.  Per patient request, with improving transfers to facilitate toileting needs, phan removed on 2/17 for trial of void.  Seems to be voiding well.  - If ongoing retention or phan replaced, follow up with OP Urology    E coli UTI, treated  UA on 2/19 with large leuk, pyuria, bacteriuria.  Started on empiric ciprofloxacin.  UC with >100k colonies E coli, susceptible to cipro.  Has now completed 5-day course of cipro 500 mg BID (2/19-2/26)     Asthma  Patient endorsing mouth irritation w/ inhaler use. Per inpatient team, roof of mouth erythematous and appears excoriated but w/o evidence of yeast.   - Continue PTA inhalers (levalbuterol and spiriva - substitute by Incruse Ellipta)  - Magic mouthwash    Tobacco use disorder  - Continue nicotine patch 14 mcg, started during recent (December) admission  - PRN nicorette gum added 2/20 for breakthrough cravings     JYOTI  - Continue BiPAP at night  - Patient reports that she has not been using BiPAP due to issues with mask, instead using oxygen with sleep.  RT met with patient on 2/20, but patient declined all alternative mask choices and expressed preference to continue nasal cannula with sleep while here instead.    - RT met with patient again on 2/21.  Patient feels hospital mask too small despite largest size available.  Recommended to bring home equip to use but patient states this is \"not possible\".  - 2/22 ABG was drawn and reviewed; patient tried BiPAP again per hospitalist team recs and encouragement.     Hyperlipidemia   - Continue PTA Lipitor 40 mg daily     Acute low back pain with bilateral radiculopathy, improving  Chronic Pain  [PTA meds: Tylenol PRN, diclofenac QID PRN, gabapentin 600 mg TID, oxycodone 5 mg 5x/day]  Of note, per PDMP #140 tabs of oxycodone 5 mg were " filled on 2/16 while patient hospitalized.  Patient reporting diffuse joint pain while inpatient. Using narcotics along with gabapentin, topical nsaids, and muscle relaxants. Aggressive diuresis vs diabetic neuropathy.  Pain management curbside consulted 1/31 while inpatient; decrease gabapentin to BID, stopped flexeril and added robaxin.  Pain improved initially at ARU admission but significantly increased pain in bilateral low back radiating to bilateral buttocks and posterior legs to feet.  Decreased with ambulation, but increased with any movement in bed.  Lumbar/thoracic xrays with grossly maintained vertebral height, no evidence of fracture.  Right thigh and hip xray with no evidence of fracture, mild degenerative changes.  US RLE 2/19 negative for DVT. US of soft tissue showed no fluid collection identified to suggest abscess; mild subcutaneous edema. As of 2/22 localized posterior right thigh pain seems more MSK related and secondary to severe cramps/trigger point. No underlying medical / electrolyte abnormality that is contributing at this point. Deconditioning and disuse atrophy are likely contributing.    - Continue PRN Tylenol, voltaren (feet - not using), capsaicin (feet - not using), robaxin, and hydroxyzine. Continue stretching and modalities.  - Resumed PTA gabapentin 600 mg TID on 2/21  - Continue warm pack, Bengay QID, TENS  - Topamax 25 mg daily discontinued on 2/24 due to possible side effects (hand tremors)  - Continue oxycodone 5 mg q6h PRN  - Have also discussed with Dr. Velazquez to continue working on relaxation techniques and coping with chronic pain.    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 Symptoms were worse that day as she tried to catch herself in the bathroom the day before. Exam suggestive of ulnar neuropathy at the elbow; talked to PT and nursing about  "elbow support when sitting in chair.   - L elbow sleeve with padding to be used during the day.   - Consider NCS/EMG as outpatient for more eval.     Adjustment disorder with depressed mood  Clinical psychology evaluation on February 22.  Appreciate assistance; see consult note for more details.     Morbid obesity  Estimated body mass index is 73.51 kg/m  as calculated from the following:    Height as of 1/23/23: 1.702 m (5' 7\").    Weight as of this encounter: 212.9 kg (469 lb 5.8 oz).  - Complicates rehab/cares    Possible COVID-19 exposure  Per ID, patient was previously on unit with high prevalence of COVID-19.  Tested negative on 2/15.  Infection prevention recommending repeat testing to confirm negative at 5-7 days.  Ordered on 2/16 by ID provider; patient declining despite education.  Afebrile, no signs/symptoms of COVID infection.  COVID PCR negative on 2/20.        1. Adjustment to disability:  Clinical psychology consulted 2/22 as above  2. FEN: mod cons CHO, 2g NA, 2g K, 2L fluid restriction  3. Bowel: continent, monitor, PRN bowel meds available  4. Bladder: phan removed on 2/17 for TOV as above  5. DVT Prophylaxis: apixaban  6. GI Prophylaxis: not indicated  7. Code: full  8. Disposition: goal for home  9. ELOS:  10-14 days  10. Follow up Appointments on Discharge: PCP in 1-2 weeks, cardiology ([facility], need to reschedule from 2/24), endocrinology (Thelma Archibald/Dr Silva Damon on 3/17), urology, ortho/podiatry (3/3 with Dr. Leahy)            Vladislav Vazquez, DO  Physical Medicine & Rehabilitation        I spent a total of 35 minutes face to face and coordinating care of Pricilla Brown. Over 50% of my time on the unit was spent counseling the patient and /or coordinating care regarding cardiac debility post prolonged medical course.  "

## 2023-03-01 NOTE — PROGRESS NOTES
"PSYCHOLOGY PROGRESS NOTE    Start time: 1400  Stop Time: 1416  Total Duration in Minutes: 16 minutes    SUBJECTIVE:  Pricilla Brown was seen today for a follow-up visit.  She is quite eager to return home.  We talked about how she felt a push to go home because she could \"see the light at the end of the tunnel.\"  She reported that she very good, the best she has felt in in a long time and she is concerned about her ability to maintain the positive health behaviors changes she has made. We discussed that she does in fact have good instincts and ability to problem-solve should anything come up.     Symptoms reported: Best she has felt in a long time.  Continues to feel pain in her right leg which affects her mobility and transfers.     Progress toward goals: Fair.     Interventions utilized: Reflective listening, socratic questioning, and cognitive thought reframing.     OBJECTIVE:  Pricilla was sitting upright on the edge of her bed.  She did require some assistance when transferring from sitting to laying due to pain her right leg.  Speech was normal.  Thoughts were goal-directed and linear and appropriate to context and conversation. She actively engaged in the session and collaborative in all aspects.     ASSESSMENT: Pricilla has made some imporant gains since her initial admission in November.  She appears to benefit most when new information is provided or explored together and then Pricilla is offered an opportunity to chose and implement whatever behavioral change she sees best for her health and lifestyle.     DIAGNOSIS: Adjustment disorder with depressed mood - although this may change should Pricilla elect to continue psychotherapy upon discharge.     PLAN: Plan for psychology to follow this patient approximately once per week for the duration of their rehabilitation stay.     Agnes Velazquez PsyD, KATIE  Clinical Neuropsychologist  "

## 2023-03-01 NOTE — PROGRESS NOTES
"SW met with pt yesterday (2/28) to complete discharge IRF questions which can be seen in flowsheet and below. Pt plans to discharge to her sister's home today with home care.        -------------------------------------------------------------------------------------------------------------  PHILLIP Pain Assessment    Pain Effect on Sleep  Over the past 5 days, how much of the time has pain made it hard for you to sleep at night?\"    2. Occasionally  0 - Does not apply - I have not had any pain or hurting in the past 5 days  1 - Rarely or nor at all  2 - Occasionally  3 - Frequently   4 - Almost Constantly  8 - Unable to Answer    Pain Interference with Therapy Activities  \"Over the past 5 days, how often have you limited your participation in rehabilitation therapy sessions due to pain?\"  1. Rarely or not at all  0 - Does not apply - I have not received rehabilitation therapy in the past 5 days  1 - Rarely or nor at all  2 - Occasionally  3 - Frequently   4 - Almost Constantly  8 - Unable to Answer    Pain Interference with Day-to-Day Activities  \"Over the past 5 days, how often have you limited your day-to-day activities (excluding rehabilitation therapy sessions) because of pain?\"  1. Rarely or not at all  1 - Rarely or nor at all  2 - Occasionally  3 - Frequently   4 - Almost Constantly  8 - Unable to Answer  -------------------------------------------------------------------------------------------------------------    Sari Yoon MARGO Green   [facility]       "

## 2023-03-01 NOTE — PLAN OF CARE
Goal Outcome Evaluation:    Pt is A&OX4, calm, & cooperative with care. Denied CP, SOB, & n/v. MOD I with walker; but calls for assistance frequently. Continent for both B&B; uses the bathroom. Takes med whole with thin liquid. Managed pain with PRN oxycodone & tylenol X1. Pt wears BIPAP @ night. Pt is expected to discharge today (03/01/23). On carb coverage & sliding scale insulin; administered per order. Pt slept few hours of the night. Able to make needs known & call light within reach. Will continue with plan of care.

## 2023-03-01 NOTE — PLAN OF CARE
/58   Pulse 94   Temp 97.7  F (36.5  C) (Oral)   Resp 20   Wt (!) 212.9 kg (469 lb 5.8 oz)   SpO2 95%   BMI 73.51 kg/m    Pt's vitals stable. On room air above 90%. Denies chest pain, SOB, dizziness, headache and fever. Dyspnea on exertion. BG stable 137. On fluid restriction 2000 ml. Voids without difficulties LBM 3/1 continent. Mod I in room with a walker. Pt is alert and oriented x4. Able to make needs known.  Pt. discharged at 11.30 am to sister's home, and left with personal belongings. Pt. received complete discharge paperwork and all medications as filled by discharge pharmacy. Pt. was given times of last dose for all discharge medications in writing on discharge medication sheets. Pt. had no further questions at the time of discharge and no unmet needs were identified.

## 2023-03-01 NOTE — PLAN OF CARE
Goal Outcome Evaluation:      Plan of Care Reviewed With: patient    Overall Patient Progress: no changeOverall Patient Progress: no change     Pt A&Ox4. Mod I with walker. Cont of B&B on shift. Mepilex on toes, cares completed on day shift. 2L FR present. No PRNs given, pt denies SOB, headache, nausea or new numbness/tingling. No new skin issues. BiPAP present at [facility]. Call light in reach, calls appropriately.

## 2023-03-01 NOTE — PLAN OF CARE
Occupational Therapy Discharge Summary    Reason for therapy discharge:    Discharged to home.    Progress towards therapy goal(s). See goals on Care Plan in [facility] electronic health record for goal details.  Goals met    Therapy recommendation(s):    Continued therapy is recommended.  Rationale/Recommendations:  to increase pt to highest functional level .

## 2023-03-03 NOTE — TELEPHONE ENCOUNTER
MTM referral from: Transitions of Care (recent hospital discharge or ED visit)    MTM referral outreach attempt #2 on March 3, 2023 at 10:20 AM      Outcome: Patient not reachable after several attempts, will route to [facility] Pharmacist/Provider as an FYI.  [facility] scheduling number is [phone].  Thank you for the referral.    Sanam Rojas [facility]

## 2023-03-06 NOTE — TELEPHONE ENCOUNTER
Called pt, cannot leave voicemail, states call cannot be completed. Also, called sister jessica, same issue.

## 2023-04-08 ENCOUNTER — HEALTH MAINTENANCE LETTER (OUTPATIENT)
Age: 48
End: 2023-04-08

## 2023-04-10 NOTE — PROGRESS NOTES
Admitted/transferred from: [facility]. SARAH BETH Thomas  Time of arrival on unit 1031  2 RN full  skin assessment completed by Leonard RODGERS RN, and Lisette Padilla RN  Skin assessment finding: issues found blood blister on R big toe  Interventions/actions: other None     Will continue to monitor.   Situation: Hypoglycemia     Called to bedside due to blood glucose levels in the 40s.  On exam patient initially appeared somnolent.  Patient was not diaphoretic.  She received 25 g x 3 of D50.  Recheck blood glucose 79.     During the course of the encounter patient mentation gradually improved.  Patient was conversant alert and oriented towards the end of the encounter.  Etiology likely iatrogenic in the setting of high doses of insulin.  It appears patient received 172 units of aspart and 100 units of glargine in the past 24 hours per endocrine.    Plan  - Encourage p.o. intake with orange juice, carbs to sustain blood glucose levels.  - Will consider D10 drip if blood glucose levels drift <70 despite p.o. intake.  - Continue hypoglycemia protocol for fingersticks.  - Hold AM. glargine and aspart (ordered at 1 unit per 1.5 g of carbs) for now pending endocrine recs.      Gerardo Sesay MD  Internal Medicine Resident (PGY3)  [phone]

## 2023-04-11 ENCOUNTER — TELEPHONE (OUTPATIENT)
Dept: FAMILY MEDICINE | Facility: CLINIC | Age: 48
End: 2023-04-11
Payer: MEDICARE

## 2023-04-11 NOTE — TELEPHONE ENCOUNTER
Dr. Roberts,    Received a call from [facility] Medical Examiners office, this patient passed away on 4/7/23 of natural causes, no autopsy was done. They asked if you would sign the death certificate, I saw you last saw patient 9/9/21 and cardiology saw patient more recently, but checked with preceptor who said PCP should sign.    Cynthia Rossi RN

## 2023-04-11 NOTE — TELEPHONE ENCOUNTER
Called [facility] ME office and updated information regarding PCP per Dr. Roberts below.    Cynthia Rossi RN

## 2023-04-11 NOTE — TELEPHONE ENCOUNTER
Accurate that PCP is typically the one who fills out the death certificate, however per chart review appears that Mario Lockhart MD is listed as PCP and saw patient in clinic in 2022 so I would recommend having the [facility] contact [facility] to direct this task to most recent PCP.

## 2023-12-16 NOTE — PLAN OF CARE
"D: 48 year old female patient with a history of HF, long-standing persistent AF, HTN, type II DM, morbid obesity, asthma, JYOTI and Graves disease s/p radioiodine ablation c/b hypothyroid admitted 1/23/2023 for heart failure exacerbation with hospital course complicated by CO2 retention and being obtunded    I: Monitored and assessed patient status; nursing cares provided.    A:   Today's Highlights/Changes:    Lantus changed to 70 units     Nicotine patch removed and re-applied to left upper arm.      Pain managed with PRN Percocet at 1105    Hydralazine not given at scheduled 1400, Systolic 95.     BP 95/57 (BP Location: Right arm, Cuff Size: Adult Regular)   Pulse 76   Temp 98  F (36.7  C) (Oral)   Resp 20   Ht 1.702 m (5' 7\")   Wt (!) 195 kg (430 lb)   SpO2 97%   BMI 67.35 kg/m        Neuro: A&O x4   Cardiac: afib, systolic 90-100s during shift. HR high 80-90s. Abebrile   Respiratory: RS, lung sounds clear, denies SOB and chest pains. Room air and Bipap during the night.    GI/: Keenan in place with good output. No BM during shift.   Diet/Appetite: Carb 2g K 2g sodium diet, good appetite. BS checks ACHS plus carb coverage.   Skin: No new deficits noted  Pain: Foot, toes, and hand pain, managed with PRN Percocet during shift.   LDA's:  2x PIV lower left arm, saline locked.   Activity: Ceiling lift assist, pt did get up to recliner during shift per PT.     P: Awaiting acute rehab unit placement. Continue to follow POC. Report any changes to Cards 1.     Rachid Walton RN    Cardiology       " Headache    A headache is pain or discomfort felt around the head or neck area. The specific cause of a headache may not be found as there are many types including tension headaches, migraine headaches, and cluster headaches. Watch your condition for any changes. Things you can do to manage your pain include taking over the counter and prescription medications as instructed by your health care provider, lying down in a dark quiet room, limiting stress, getting regular sleep, and refraining from alcohol and tobacco products.    SEEK IMMEDIATE MEDICAL CARE IF YOU HAVE ANY OF THE FOLLOWING SYMPTOMS: fever, vomiting, stiff neck, loss of vision, problems with speech, muscle weakness, loss of balance, trouble walking, passing out, or confusion.       Electronic Cigarette Information  An example of an electronic cigarette, also known as an e-cigarette.  Electronic cigarettes, or e-cigarettes, are battery-operated devices that deliver nicotine—a very addictive drug—to the body. They come in many shapes, including in the shape of a cigarette, pipe, pen, and even a USB memory stick. Electronic cigarettes may be called e-cigs, e-hookahs, vape pens, and electronic nicotine delivery systems (ENDS).    E-cigarettes have a cartridge that contains a liquid form of nicotine. When a person uses the device, the liquid heats up. It then becomes a vapor that a person inhales. Using e-cigarettes may be called vaping.    Nicotine is thought to increase your risk for certain types of cancer. In addition to nicotine, e-cigarettes may contain other harmful and cancer-causing chemicals, including:  Formaldehyde.  Acetaldehyde.  Heavy metals.  Ultrafine particles that can get inhaled deep into the lungs.  Chemical colorings and flavorings.  It is not clear how much nicotine you get when vaping, and it is hard to know what chemicals are in the vaping liquids. The health effects of vaping are not completely known, but you should be aware of the possible dangers of using these products.    Some people may use e-cigarettes to quit smoking tobacco. However, this has not been proven to work, and the Food and Drug Administration (FDA) has not approved e-cigarettes for this purpose.    How can using electronic cigarettes affect me?  You may be at risk for developing a dangerous lung disease. There are reports of an increasing number of cases involving serious lung problems, and even death, associated with e-cigarette use. Your risk may be even higher if you:  Buy e-cigarettes or vaping oils off the street.  Add any substances to the e-cigarettes that are not intended by the .  Vaping may make you crave nicotine. Nicotine does the following:  Changes your blood sugar levels.  Increases your heart rate, blood pressure, and breathing rate.  Increases your risk of developing blood clots (hypercoagulable state) and diabetes.  Increases your risk of gum disease that may lead to losing teeth.  If you smoke e-cigarettes, you may be more likely to start smoking or to smoke more tobacco cigarettes.  Becoming addicted to nicotine may make your brain more sensitive to other addictive drugs. You may move to other addictive substances.  You may be in danger of overdosing on nicotine. Nicotine poisoning can cause nausea, vomiting, seizures, and trouble breathing.  Vaping has also been linked to decreases in memory and attention span in children and teens.  If you are pregnant, the nicotine in e-cigarettes may be harmful to your baby. Nicotine can cause:  Brain or lung problems for your baby.  Your baby to be born too early.  Your baby to be born with a low birth weight.  What actions can I take to stop vaping?  If you can, stop vaping on your own before you become addicted to nicotine. If you need help quitting, ask your health care provider. There are three effective ways to fight nicotine addiction:  Nicotine replacement therapy. Using nicotine gum or a nicotine patch blocks your craving for nicotine. Over time, you can reduce the amount of nicotine you use until you can stop using nicotine completely without having cravings.  Prescription medicines approved to fight nicotine addiction. These stop nicotine cravings or block the effects of nicotine.  Behavioral therapy. This may include:  A self-help smoking cessation program.  Individual or group therapy.  A smoking cessation support group.  There are several national programs to help you quit smoking or vaping. These include:  Text message programs, such as SmokefreeTXT.  Apps for mobile phones, including the free quitSTART bety.  Hotlines, such as -800-QUIT-NOW (1-266.909.8852).  Where to find support  You can get support at these sites:  U.S. Department of Health and Human Services: smokefree.gov  American Lung Association: www.lung.org  Where to find more information  Learn more about e-cigarettes from:  National Otto on Drug Abuse: www.drugabuse.gov  Centers for Disease Control and Prevention: www.cdc.gov  Summary  E-cigarettes can cause nicotine addiction.  E-cigarettes are not approved as a way to stop smoking. They are not a risk-free alternative to smoking tobacco.  There are reports of an increasing number of cases involving serious lung problems, and even death, associated with e-cigarette use.  If you can stop vaping on your own, do it before you become addicted to nicotine. If you need help quitting, ask your health care provider.  There are various methods and programs that can help you stop smoking or vaping.  This information is not intended to replace advice given to you by your health care provider. Make sure you discuss any questions you have with your health care provider. Headache    A headache is pain or discomfort felt around the head or neck area. The specific cause of a headache may not be found as there are many types including tension headaches, migraine headaches, and cluster headaches. Watch your condition for any changes. Things you can do to manage your pain include taking over the counter and prescription medications as instructed by your health care provider, lying down in a dark quiet room, limiting stress, getting regular sleep, and refraining from alcohol and tobacco products.    SEEK IMMEDIATE MEDICAL CARE IF YOU HAVE ANY OF THE FOLLOWING SYMPTOMS: fever, vomiting, stiff neck, loss of vision, problems with speech, muscle weakness, loss of balance, trouble walking, passing out, or confusion.       Electronic Cigarette Information  An example of an electronic cigarette, also known as an e-cigarette.  Electronic cigarettes, or e-cigarettes, are battery-operated devices that deliver nicotine—a very addictive drug—to the body. They come in many shapes, including in the shape of a cigarette, pipe, pen, and even a USB memory stick. Electronic cigarettes may be called e-cigs, e-hookahs, vape pens, and electronic nicotine delivery systems (ENDS).    E-cigarettes have a cartridge that contains a liquid form of nicotine. When a person uses the device, the liquid heats up. It then becomes a vapor that a person inhales. Using e-cigarettes may be called vaping.    Nicotine is thought to increase your risk for certain types of cancer. In addition to nicotine, e-cigarettes may contain other harmful and cancer-causing chemicals, including:  Formaldehyde.  Acetaldehyde.  Heavy metals.  Ultrafine particles that can get inhaled deep into the lungs.  Chemical colorings and flavorings.  It is not clear how much nicotine you get when vaping, and it is hard to know what chemicals are in the vaping liquids. The health effects of vaping are not completely known, but you should be aware of the possible dangers of using these products.    Some people may use e-cigarettes to quit smoking tobacco. However, this has not been proven to work, and the Food and Drug Administration (FDA) has not approved e-cigarettes for this purpose.    How can using electronic cigarettes affect me?  You may be at risk for developing a dangerous lung disease. There are reports of an increasing number of cases involving serious lung problems, and even death, associated with e-cigarette use. Your risk may be even higher if you:  Buy e-cigarettes or vaping oils off the street.  Add any substances to the e-cigarettes that are not intended by the .  Vaping may make you crave nicotine. Nicotine does the following:  Changes your blood sugar levels.  Increases your heart rate, blood pressure, and breathing rate.  Increases your risk of developing blood clots (hypercoagulable state) and diabetes.  Increases your risk of gum disease that may lead to losing teeth.  If you smoke e-cigarettes, you may be more likely to start smoking or to smoke more tobacco cigarettes.  Becoming addicted to nicotine may make your brain more sensitive to other addictive drugs. You may move to other addictive substances.  You may be in danger of overdosing on nicotine. Nicotine poisoning can cause nausea, vomiting, seizures, and trouble breathing.  Vaping has also been linked to decreases in memory and attention span in children and teens.  If you are pregnant, the nicotine in e-cigarettes may be harmful to your baby. Nicotine can cause:  Brain or lung problems for your baby.  Your baby to be born too early.  Your baby to be born with a low birth weight.  What actions can I take to stop vaping?  If you can, stop vaping on your own before you become addicted to nicotine. If you need help quitting, ask your health care provider. There are three effective ways to fight nicotine addiction:  Nicotine replacement therapy. Using nicotine gum or a nicotine patch blocks your craving for nicotine. Over time, you can reduce the amount of nicotine you use until you can stop using nicotine completely without having cravings.  Prescription medicines approved to fight nicotine addiction. These stop nicotine cravings or block the effects of nicotine.  Behavioral therapy. This may include:  A self-help smoking cessation program.  Individual or group therapy.  A smoking cessation support group.  There are several national programs to help you quit smoking or vaping. These include:  Text message programs, such as SmokefreeTXT.  Apps for mobile phones, including the free quitSTART bety.  Hotlines, such as 2-800-QUIT-NOW (1-813.757.4478).  Where to find support  You can get support at these sites:  U.S. Department of Health and Human Services: smokefree.gov  American Lung Association: www.lung.org  Where to find more information  Learn more about e-cigarettes from:  National Violet on Drug Abuse: www.drugabuse.gov  Centers for Disease Control and Prevention: www.cdc.gov  Summary  E-cigarettes can cause nicotine addiction.  E-cigarettes are not approved as a way to stop smoking. They are not a risk-free alternative to smoking tobacco.  There are reports of an increasing number of cases involving serious lung problems, and even death, associated with e-cigarette use.  If you can stop vaping on your own, do it before you become addicted to nicotine. If you need help quitting, ask your health care provider.  There are various methods and programs that can help you stop smoking or vaping.  This information is not intended to replace advice given to you by your health care provider. Make sure you discuss any questions you have with your health care provider.

## 2024-01-01 NOTE — PROGRESS NOTES
Writer went into pt room and found pt on CPAP machine but circuit was disconnected. Placed pt back on machine, cpap 5 21% and pt RR was 6-7. Writer decided to place pt on BIPAP S/T mode to help assist with breathing. Settings are 10/5 12 21%. VSS and will continue to monitor pt.     Anoop Walton, RT on 12/27/2022 at 12:47 AM     -Poor feeding (fewer than 5 feedings in 24 hours)

## 2024-09-03 NOTE — PROGRESS NOTES
"CLINICAL NUTRITION SERVICES    Reason for Assessment:  Low-sodium (2 g/day) nutrition education, received consult    Diet History:  Per RD note 12/2021, \"Pricilla Brown is a 46 year old presenting today for new bariatric nutrition consult.   Pt is interested in laparoscopic sleeve gastrectomy with TBD expected surgery in TBD.  Patient is accompanied by self.  This is pt's first of 3 required nutrition visits prior to surgery. Pt states that her main struggle is lack of physical activity, pop consumption and snacking often throughout the day.\" RD provided nutrition education.     Unknown if pt has received low-sodium nutrition education in the past. Pt was sleeping during attempts to see.     Nutrition Diagnosis:  Unable to assess    Nutrition Prescription/Recs:  Rec low-sodium, consistent carb diet. Monitor need for fluid restriction, if warranted.       Interventions:  Nutrition Education: Pt was sleeping during attempts. Left the following handouts in her room: Low-Sodium Foods and Drinks, Carb Counting, and room service menus (sodium and carbs).     Goals:    Pt will verbalize at least five high sodium foods and the importance of avoiding added salt to foods for cooking or seasoning foods.     Follow-up:   Patient to ask any further nutrition-related questions before discharge. In addition, pt may request outpatient RD appointment.     Vera Pelaez, MS, RD, LD, [facility]   6C Pgr: [phone]   " \"Have you been to the ER, urgent care clinic since your last visit?  Hospitalized since your last visit?\"    NO    “Have you seen or consulted any other health care providers outside of [facility] since your last visit?”    NO

## (undated) RX ORDER — LIDOCAINE HYDROCHLORIDE 10 MG/ML
INJECTION, SOLUTION EPIDURAL; INFILTRATION; INTRACAUDAL; PERINEURAL
Status: DISPENSED
Start: 2023-01-01